# Patient Record
Sex: MALE | Race: BLACK OR AFRICAN AMERICAN | NOT HISPANIC OR LATINO | Employment: FULL TIME | URBAN - METROPOLITAN AREA
[De-identification: names, ages, dates, MRNs, and addresses within clinical notes are randomized per-mention and may not be internally consistent; named-entity substitution may affect disease eponyms.]

---

## 2022-10-10 ENCOUNTER — HOSPITAL ENCOUNTER (INPATIENT)
Facility: HOSPITAL | Age: 42
LOS: 8 days | Discharge: HOME HEALTH | DRG: 682 | End: 2022-10-18
Attending: EMERGENCY MEDICINE | Admitting: STUDENT IN AN ORGANIZED HEALTH CARE EDUCATION/TRAINING PROGRAM
Payer: COMMERCIAL

## 2022-10-10 DIAGNOSIS — I16.1 HYPERTENSIVE EMERGENCY: ICD-10-CM

## 2022-10-10 DIAGNOSIS — R79.89 ELEVATED BRAIN NATRIURETIC PEPTIDE (BNP) LEVEL: ICD-10-CM

## 2022-10-10 DIAGNOSIS — R07.9 CHEST PAIN: ICD-10-CM

## 2022-10-10 DIAGNOSIS — R79.89 ELEVATED TROPONIN: Primary | ICD-10-CM

## 2022-10-10 DIAGNOSIS — N17.9 ACUTE RENAL FAILURE, UNSPECIFIED ACUTE RENAL FAILURE TYPE: ICD-10-CM

## 2022-10-10 DIAGNOSIS — R06.02 SOB (SHORTNESS OF BREATH): ICD-10-CM

## 2022-10-10 DIAGNOSIS — I50.31 ACUTE HEART FAILURE WITH PRESERVED EJECTION FRACTION: ICD-10-CM

## 2022-10-10 DIAGNOSIS — R94.31 T WAVE INVERSION IN EKG: ICD-10-CM

## 2022-10-10 PROBLEM — E87.20 ACIDOSIS, METABOLIC: Status: ACTIVE | Noted: 2022-10-10

## 2022-10-10 PROBLEM — E83.51 HYPOCALCEMIA: Status: ACTIVE | Noted: 2022-10-10

## 2022-10-10 PROBLEM — D64.9 ANEMIA: Status: ACTIVE | Noted: 2022-10-10

## 2022-10-10 PROBLEM — E66.9 OBESITY (BMI 30-39.9): Status: ACTIVE | Noted: 2022-10-10

## 2022-10-10 PROBLEM — E88.09 HYPOALBUMINEMIA: Status: ACTIVE | Noted: 2022-10-10

## 2022-10-10 LAB
ALBUMIN SERPL BCP-MCNC: 2.7 G/DL (ref 3.5–5.2)
ALP SERPL-CCNC: 119 U/L (ref 55–135)
ALT SERPL W/O P-5'-P-CCNC: 94 U/L (ref 10–44)
ANION GAP SERPL CALC-SCNC: 17 MMOL/L (ref 8–16)
APTT BLDCRRT: 24.5 SEC (ref 21–32)
AST SERPL-CCNC: 39 U/L (ref 10–40)
BACTERIA #/AREA URNS HPF: NORMAL /HPF
BASOPHILS # BLD AUTO: 0.03 K/UL (ref 0–0.2)
BASOPHILS NFR BLD: 0.4 % (ref 0–1.9)
BILIRUB SERPL-MCNC: 0.6 MG/DL (ref 0.1–1)
BILIRUB UR QL STRIP: NEGATIVE
BNP SERPL-MCNC: 3678 PG/ML (ref 0–99)
BUN SERPL-MCNC: 80 MG/DL (ref 6–20)
CALCIUM SERPL-MCNC: 6.7 MG/DL (ref 8.7–10.5)
CHLORIDE SERPL-SCNC: 103 MMOL/L (ref 95–110)
CLARITY UR: CLEAR
CO2 SERPL-SCNC: 17 MMOL/L (ref 23–29)
COLOR UR: YELLOW
CREAT SERPL-MCNC: 7.9 MG/DL (ref 0.5–1.4)
CREAT UR-MCNC: 23.8 MG/DL (ref 23–375)
DIFFERENTIAL METHOD: ABNORMAL
EOSINOPHIL # BLD AUTO: 0.1 K/UL (ref 0–0.5)
EOSINOPHIL NFR BLD: 1.1 % (ref 0–8)
ERYTHROCYTE [DISTWIDTH] IN BLOOD BY AUTOMATED COUNT: 15.9 % (ref 11.5–14.5)
EST. GFR  (NO RACE VARIABLE): 8 ML/MIN/1.73 M^2
FERRITIN SERPL-MCNC: 29 NG/ML (ref 20–300)
GLUCOSE SERPL-MCNC: 104 MG/DL (ref 70–110)
GLUCOSE UR QL STRIP: NEGATIVE
HCT VFR BLD AUTO: 31.6 % (ref 40–54)
HCV AB SERPL QL IA: NORMAL
HGB BLD-MCNC: 10.7 G/DL (ref 14–18)
HGB UR QL STRIP: ABNORMAL
HIV 1+2 AB+HIV1 P24 AG SERPL QL IA: NORMAL
HYALINE CASTS #/AREA URNS LPF: 0 /LPF
IMM GRANULOCYTES # BLD AUTO: 0.02 K/UL (ref 0–0.04)
IMM GRANULOCYTES NFR BLD AUTO: 0.2 % (ref 0–0.5)
INR PPP: 1.1 (ref 0.8–1.2)
KETONES UR QL STRIP: NEGATIVE
LACTATE SERPL-SCNC: 0.6 MMOL/L (ref 0.5–2.2)
LEUKOCYTE ESTERASE UR QL STRIP: NEGATIVE
LYMPHOCYTES # BLD AUTO: 1 K/UL (ref 1–4.8)
LYMPHOCYTES NFR BLD: 12.1 % (ref 18–48)
MAGNESIUM SERPL-MCNC: 2 MG/DL (ref 1.6–2.6)
MCH RBC QN AUTO: 29.4 PG (ref 27–31)
MCHC RBC AUTO-ENTMCNC: 33.9 G/DL (ref 32–36)
MCV RBC AUTO: 87 FL (ref 82–98)
MICROSCOPIC COMMENT: NORMAL
MONOCYTES # BLD AUTO: 1 K/UL (ref 0.3–1)
MONOCYTES NFR BLD: 12 % (ref 4–15)
NEUTROPHILS # BLD AUTO: 6 K/UL (ref 1.8–7.7)
NEUTROPHILS NFR BLD: 74.2 % (ref 38–73)
NITRITE UR QL STRIP: NEGATIVE
NRBC BLD-RTO: 0 /100 WBC
PH UR STRIP: 6 [PH] (ref 5–8)
PHOSPHATE SERPL-MCNC: 6.1 MG/DL (ref 2.7–4.5)
PLATELET # BLD AUTO: 342 K/UL (ref 150–450)
PMV BLD AUTO: 10.3 FL (ref 9.2–12.9)
POTASSIUM SERPL-SCNC: 3.5 MMOL/L (ref 3.5–5.1)
PROT SERPL-MCNC: 5.8 G/DL (ref 6–8.4)
PROT UR QL STRIP: ABNORMAL
PROT UR-MCNC: 45 MG/DL (ref 0–15)
PROT/CREAT UR: 1.89 MG/G{CREAT} (ref 0–0.2)
PROTHROMBIN TIME: 11.6 SEC (ref 9–12.5)
PTH-INTACT SERPL-MCNC: 517.7 PG/ML (ref 9–77)
PTH-INTACT SERPL-MCNC: 632.4 PG/ML (ref 9–77)
RBC # BLD AUTO: 3.64 M/UL (ref 4.6–6.2)
RBC #/AREA URNS HPF: 1 /HPF (ref 0–4)
SARS-COV-2 RDRP RESP QL NAA+PROBE: NEGATIVE
SODIUM SERPL-SCNC: 137 MMOL/L (ref 136–145)
SODIUM UR-SCNC: 91 MMOL/L (ref 20–250)
SP GR UR STRIP: 1.02 (ref 1–1.03)
TROPONIN I SERPL DL<=0.01 NG/ML-MCNC: 0.47 NG/ML (ref 0–0.03)
TROPONIN I SERPL DL<=0.01 NG/ML-MCNC: 0.48 NG/ML (ref 0–0.03)
TROPONIN I SERPL DL<=0.01 NG/ML-MCNC: 0.48 NG/ML (ref 0–0.03)
TSH SERPL DL<=0.005 MIU/L-ACNC: 1.7 UIU/ML (ref 0.4–4)
URN SPEC COLLECT METH UR: ABNORMAL
UROBILINOGEN UR STRIP-ACNC: NEGATIVE EU/DL
WBC # BLD AUTO: 8.1 K/UL (ref 3.9–12.7)
WBC #/AREA URNS HPF: 0 /HPF (ref 0–5)

## 2022-10-10 PROCEDURE — 87389 HIV-1 AG W/HIV-1&-2 AB AG IA: CPT | Performed by: EMERGENCY MEDICINE

## 2022-10-10 PROCEDURE — 86803 HEPATITIS C AB TEST: CPT | Performed by: EMERGENCY MEDICINE

## 2022-10-10 PROCEDURE — 83970 ASSAY OF PARATHORMONE: CPT | Mod: 91 | Performed by: STUDENT IN AN ORGANIZED HEALTH CARE EDUCATION/TRAINING PROGRAM

## 2022-10-10 PROCEDURE — 80307 DRUG TEST PRSMV CHEM ANLYZR: CPT | Performed by: STUDENT IN AN ORGANIZED HEALTH CARE EDUCATION/TRAINING PROGRAM

## 2022-10-10 PROCEDURE — 99291 CRITICAL CARE FIRST HOUR: CPT | Mod: 25

## 2022-10-10 PROCEDURE — 85730 THROMBOPLASTIN TIME PARTIAL: CPT | Performed by: STUDENT IN AN ORGANIZED HEALTH CARE EDUCATION/TRAINING PROGRAM

## 2022-10-10 PROCEDURE — 84156 ASSAY OF PROTEIN URINE: CPT | Performed by: STUDENT IN AN ORGANIZED HEALTH CARE EDUCATION/TRAINING PROGRAM

## 2022-10-10 PROCEDURE — 83735 ASSAY OF MAGNESIUM: CPT | Performed by: INTERNAL MEDICINE

## 2022-10-10 PROCEDURE — 80053 COMPREHEN METABOLIC PANEL: CPT | Performed by: PHYSICIAN ASSISTANT

## 2022-10-10 PROCEDURE — 93010 EKG 12-LEAD: ICD-10-PCS | Mod: ,,, | Performed by: SPECIALIST

## 2022-10-10 PROCEDURE — 84300 ASSAY OF URINE SODIUM: CPT | Performed by: STUDENT IN AN ORGANIZED HEALTH CARE EDUCATION/TRAINING PROGRAM

## 2022-10-10 PROCEDURE — 25000003 PHARM REV CODE 250: Performed by: EMERGENCY MEDICINE

## 2022-10-10 PROCEDURE — 36415 COLL VENOUS BLD VENIPUNCTURE: CPT | Performed by: EMERGENCY MEDICINE

## 2022-10-10 PROCEDURE — 63600175 PHARM REV CODE 636 W HCPCS: Performed by: EMERGENCY MEDICINE

## 2022-10-10 PROCEDURE — 93005 ELECTROCARDIOGRAM TRACING: CPT

## 2022-10-10 PROCEDURE — 85610 PROTHROMBIN TIME: CPT | Performed by: STUDENT IN AN ORGANIZED HEALTH CARE EDUCATION/TRAINING PROGRAM

## 2022-10-10 PROCEDURE — 84443 ASSAY THYROID STIM HORMONE: CPT | Performed by: STUDENT IN AN ORGANIZED HEALTH CARE EDUCATION/TRAINING PROGRAM

## 2022-10-10 PROCEDURE — 11000001 HC ACUTE MED/SURG PRIVATE ROOM

## 2022-10-10 PROCEDURE — 63600175 PHARM REV CODE 636 W HCPCS: Performed by: NURSE PRACTITIONER

## 2022-10-10 PROCEDURE — 99292 CRITICAL CARE ADDL 30 MIN: CPT

## 2022-10-10 PROCEDURE — 25000003 PHARM REV CODE 250: Performed by: PHYSICIAN ASSISTANT

## 2022-10-10 PROCEDURE — 93010 ELECTROCARDIOGRAM REPORT: CPT | Mod: ,,, | Performed by: SPECIALIST

## 2022-10-10 PROCEDURE — 85025 COMPLETE CBC W/AUTO DIFF WBC: CPT | Performed by: PHYSICIAN ASSISTANT

## 2022-10-10 PROCEDURE — 63600175 PHARM REV CODE 636 W HCPCS: Performed by: STUDENT IN AN ORGANIZED HEALTH CARE EDUCATION/TRAINING PROGRAM

## 2022-10-10 PROCEDURE — 84466 ASSAY OF TRANSFERRIN: CPT | Performed by: STUDENT IN AN ORGANIZED HEALTH CARE EDUCATION/TRAINING PROGRAM

## 2022-10-10 PROCEDURE — 84484 ASSAY OF TROPONIN QUANT: CPT | Mod: 91 | Performed by: STUDENT IN AN ORGANIZED HEALTH CARE EDUCATION/TRAINING PROGRAM

## 2022-10-10 PROCEDURE — 84540 ASSAY OF URINE/UREA-N: CPT | Performed by: STUDENT IN AN ORGANIZED HEALTH CARE EDUCATION/TRAINING PROGRAM

## 2022-10-10 PROCEDURE — 96375 TX/PRO/DX INJ NEW DRUG ADDON: CPT

## 2022-10-10 PROCEDURE — 83880 ASSAY OF NATRIURETIC PEPTIDE: CPT | Performed by: PHYSICIAN ASSISTANT

## 2022-10-10 PROCEDURE — 96374 THER/PROPH/DIAG INJ IV PUSH: CPT

## 2022-10-10 PROCEDURE — 83970 ASSAY OF PARATHORMONE: CPT | Performed by: INTERNAL MEDICINE

## 2022-10-10 PROCEDURE — 84484 ASSAY OF TROPONIN QUANT: CPT | Mod: 91 | Performed by: PHYSICIAN ASSISTANT

## 2022-10-10 PROCEDURE — 84100 ASSAY OF PHOSPHORUS: CPT | Performed by: INTERNAL MEDICINE

## 2022-10-10 PROCEDURE — 83605 ASSAY OF LACTIC ACID: CPT | Performed by: STUDENT IN AN ORGANIZED HEALTH CARE EDUCATION/TRAINING PROGRAM

## 2022-10-10 PROCEDURE — U0002 COVID-19 LAB TEST NON-CDC: HCPCS | Performed by: EMERGENCY MEDICINE

## 2022-10-10 PROCEDURE — 36415 COLL VENOUS BLD VENIPUNCTURE: CPT | Performed by: STUDENT IN AN ORGANIZED HEALTH CARE EDUCATION/TRAINING PROGRAM

## 2022-10-10 PROCEDURE — 81000 URINALYSIS NONAUTO W/SCOPE: CPT | Performed by: PHYSICIAN ASSISTANT

## 2022-10-10 PROCEDURE — 83036 HEMOGLOBIN GLYCOSYLATED A1C: CPT | Performed by: STUDENT IN AN ORGANIZED HEALTH CARE EDUCATION/TRAINING PROGRAM

## 2022-10-10 PROCEDURE — 25000003 PHARM REV CODE 250: Performed by: STUDENT IN AN ORGANIZED HEALTH CARE EDUCATION/TRAINING PROGRAM

## 2022-10-10 PROCEDURE — 36415 COLL VENOUS BLD VENIPUNCTURE: CPT | Performed by: PHYSICIAN ASSISTANT

## 2022-10-10 PROCEDURE — 82728 ASSAY OF FERRITIN: CPT | Performed by: STUDENT IN AN ORGANIZED HEALTH CARE EDUCATION/TRAINING PROGRAM

## 2022-10-10 RX ORDER — AMLODIPINE BESYLATE 5 MG/1
10 TABLET ORAL DAILY
Status: DISCONTINUED | OUTPATIENT
Start: 2022-10-11 | End: 2022-10-11

## 2022-10-10 RX ORDER — CALCIUM GLUCONATE 20 MG/ML
1 INJECTION, SOLUTION INTRAVENOUS ONCE
Status: COMPLETED | OUTPATIENT
Start: 2022-10-10 | End: 2022-10-10

## 2022-10-10 RX ORDER — ASPIRIN 325 MG
325 TABLET ORAL
Status: COMPLETED | OUTPATIENT
Start: 2022-10-10 | End: 2022-10-10

## 2022-10-10 RX ORDER — HEPARIN SODIUM 5000 [USP'U]/ML
5000 INJECTION, SOLUTION INTRAVENOUS; SUBCUTANEOUS EVERY 8 HOURS
Status: DISCONTINUED | OUTPATIENT
Start: 2022-10-10 | End: 2022-10-18 | Stop reason: HOSPADM

## 2022-10-10 RX ORDER — SODIUM CHLORIDE 0.9 % (FLUSH) 0.9 %
10 SYRINGE (ML) INJECTION
Status: DISCONTINUED | OUTPATIENT
Start: 2022-10-10 | End: 2022-10-18 | Stop reason: HOSPADM

## 2022-10-10 RX ORDER — LABETALOL HYDROCHLORIDE 5 MG/ML
20 INJECTION, SOLUTION INTRAVENOUS
Status: COMPLETED | OUTPATIENT
Start: 2022-10-10 | End: 2022-10-10

## 2022-10-10 RX ORDER — NALOXONE HCL 0.4 MG/ML
0.02 VIAL (ML) INJECTION
Status: DISCONTINUED | OUTPATIENT
Start: 2022-10-10 | End: 2022-10-18 | Stop reason: HOSPADM

## 2022-10-10 RX ORDER — HYDROMORPHONE HYDROCHLORIDE 2 MG/ML
1 INJECTION, SOLUTION INTRAMUSCULAR; INTRAVENOUS; SUBCUTANEOUS
Status: COMPLETED | OUTPATIENT
Start: 2022-10-10 | End: 2022-10-10

## 2022-10-10 RX ORDER — HYDROCHLOROTHIAZIDE 25 MG/1
25 TABLET ORAL DAILY
Status: ON HOLD | COMMUNITY
End: 2022-10-18 | Stop reason: HOSPADM

## 2022-10-10 RX ORDER — NICARDIPINE HYDROCHLORIDE 0.2 MG/ML
0-15 INJECTION INTRAVENOUS CONTINUOUS
Status: DISCONTINUED | OUTPATIENT
Start: 2022-10-10 | End: 2022-10-12

## 2022-10-10 RX ORDER — ACETAMINOPHEN 325 MG/1
650 TABLET ORAL EVERY 8 HOURS PRN
Status: DISCONTINUED | OUTPATIENT
Start: 2022-10-10 | End: 2022-10-11

## 2022-10-10 RX ORDER — HYDRALAZINE HYDROCHLORIDE 20 MG/ML
10 INJECTION INTRAMUSCULAR; INTRAVENOUS EVERY 8 HOURS PRN
Status: DISCONTINUED | OUTPATIENT
Start: 2022-10-10 | End: 2022-10-18 | Stop reason: HOSPADM

## 2022-10-10 RX ORDER — LABETALOL HYDROCHLORIDE 5 MG/ML
20 INJECTION, SOLUTION INTRAVENOUS EVERY 4 HOURS PRN
Status: DISCONTINUED | OUTPATIENT
Start: 2022-10-10 | End: 2022-10-18 | Stop reason: HOSPADM

## 2022-10-10 RX ORDER — AMLODIPINE AND VALSARTAN 10; 160 MG/1; MG/1
1 TABLET ORAL DAILY
Status: ON HOLD | COMMUNITY
End: 2022-10-18 | Stop reason: HOSPADM

## 2022-10-10 RX ORDER — FUROSEMIDE 10 MG/ML
80 INJECTION INTRAMUSCULAR; INTRAVENOUS
Status: DISCONTINUED | OUTPATIENT
Start: 2022-10-10 | End: 2022-10-11

## 2022-10-10 RX ADMIN — FUROSEMIDE 80 MG: 10 INJECTION, SOLUTION INTRAMUSCULAR; INTRAVENOUS at 06:10

## 2022-10-10 RX ADMIN — NICARDIPINE HYDROCHLORIDE 5 MG/HR: 0.2 INJECTION, SOLUTION INTRAVENOUS at 06:10

## 2022-10-10 RX ADMIN — LABETALOL HYDROCHLORIDE 20 MG: 5 INJECTION INTRAVENOUS at 04:10

## 2022-10-10 RX ADMIN — HYDRALAZINE HYDROCHLORIDE 10 MG: 20 INJECTION INTRAMUSCULAR; INTRAVENOUS at 10:10

## 2022-10-10 RX ADMIN — CALCIUM GLUCONATE 1 G: 20 INJECTION, SOLUTION INTRAVENOUS at 06:10

## 2022-10-10 RX ADMIN — ASPIRIN 325 MG ORAL TABLET 325 MG: 325 PILL ORAL at 03:10

## 2022-10-10 RX ADMIN — HYDROMORPHONE HYDROCHLORIDE 1 MG: 2 INJECTION INTRAMUSCULAR; INTRAVENOUS; SUBCUTANEOUS at 04:10

## 2022-10-10 RX ADMIN — AMLODIPINE BESYLATE 10 MG: 5 TABLET ORAL at 11:10

## 2022-10-10 RX ADMIN — HEPARIN SODIUM 5000 UNITS: 5000 INJECTION INTRAVENOUS; SUBCUTANEOUS at 09:10

## 2022-10-10 NOTE — ED NOTES
Lights have been turned off for comfort. No needs or questions at this time. Patient has a call light within reach.

## 2022-10-10 NOTE — ED NOTES
João Ham presents to the ED with c/o mid sternal chest pain with SOB and bilateral lower leg swelling that started 2 weeks ago. Patient arrives to the ED hypertensive. Patient reports that he stopped taking his BP meds because he thought they were making him swell. . MARY VSS  Verified patient's name and date of birth.

## 2022-10-10 NOTE — Clinical Note
Diagnosis: Hypertensive emergency [[id number]]   Admitting Provider:: RYDER CONTRERAS [[id number]]   Future Attending Provider: RYDER CONTRERAS [[id number]]   Reason for IP Medical Treatment  (Clinical interventions that can only be accomplished in the IP setting? ) :: IV BP meds; Cardiology and Renal evaluations   Estimated Length of Stay:: 3-4 midnights   I certify that Inpatient services for greater than or equal to 2 midnights are medically necessary:: Yes   Plans for Post-Acute care--if anticipated (pick the single best option):: C. Discharge home with home health services   Special Needs:: No Special Needs [1]

## 2022-10-10 NOTE — ED PROVIDER NOTES
"Encounter Date: 10/10/2022    SCRIBE #1 NOTE: I, Joselyn Becerra, am scribing for, and in the presence of,  Reynaldo Fox MD.     History     Chief Complaint   Patient presents with    Shortness of Breath     X 2 weeks     Chest Pain    Cough    Leg Swelling     Scrotum swelling      Time seen by provider: 2:51 PM on 10/10/2022    João Ham is a 42 y.o. male with a PMHx of HTN who presents to the ED with an onset of leg swelling, scrotal swelling, abdominal distention, and SOB for approximately 2 weeks with 1 episode of "racing heart." Patient reports he has a "good walk" from his vehicle to his office at work and notes he had to stop to catch his breath. He denies prior similar episodes of symptoms. Patient states he stopped his Amlodipine, Valsartin and HCTZ a week ago because he thought it was contributing to his swelling. The patient denies any other symptoms at this time. No documented PSHx.    The history is provided by the patient.   Review of patient's allergies indicates:  No Known Allergies  No past medical history on file.  No past surgical history on file.  No family history on file.     Review of Systems   Respiratory:  Positive for shortness of breath.    Cardiovascular:  Positive for palpitations and leg swelling.   Gastrointestinal:  Positive for abdominal distention.   Genitourinary:  Positive for scrotal swelling.   All other systems reviewed and are negative.    Physical Exam     Initial Vitals [10/10/22 1340]   BP Pulse Resp Temp SpO2   (!) 212/139 106 20 98.5 °F (36.9 °C) 98 %      MAP       --         Physical Exam    Nursing note and vitals reviewed.  Constitutional: He appears well-developed and well-nourished. He is not diaphoretic.  Non-toxic appearance. He does not have a sickly appearance. He does not appear ill. No distress.   Appears fatigued   HENT:   Head: Normocephalic and atraumatic.   Eyes: EOM are normal.   Neck: Neck supple.   Normal range of motion.  Cardiovascular:  Normal " rate, regular rhythm and normal heart sounds.     Exam reveals no gallop and no friction rub.       No murmur heard.  Pulmonary/Chest: Breath sounds normal. No respiratory distress. He has no wheezes. He has no rhonchi. He has no rales.   Abdominal: Abdomen is soft. He exhibits no distension. There is no abdominal tenderness.   Genitourinary:    Genitourinary Comments: Markedly swollen testicles.     Musculoskeletal:         General: Normal range of motion.      Cervical back: Normal range of motion and neck supple. No rigidity. Normal range of motion.      Comments: Pitting edema to BLEs.     Neurological: He is alert and oriented to person, place, and time.   Skin: Skin is warm and dry. No rash noted.   Psychiatric: He has a normal mood and affect. His behavior is normal. Judgment and thought content normal.       ED Course   Critical Care    Date/Time: 10/10/2022 8:09 PM  Performed by: Reynaldo Fox MD  Authorized by: Jono Knowles MD   Direct patient critical care time: 66 minutes  Ordering / reviewing critical care time: 12 minutes  Documentation critical care time: 10 minutes  Consulting other physicians critical care time: 12 (HM, cards, neph) minutes  Total critical care time (exclusive of procedural time) : 100 minutes  Critical care was necessary to treat or prevent imminent or life-threatening deterioration of the following conditions: cardiac failure, metabolic crisis and renal failure.  Critical care was time spent personally by me on the following activities: discussions with consultants, evaluation of patient's response to treatment, examination of patient, obtaining history from patient or surrogate, ordering and performing treatments and interventions, ordering and review of laboratory studies, ordering and review of radiographic studies, pulse oximetry and re-evaluation of patient's condition.      Labs Reviewed   CBC W/ AUTO DIFFERENTIAL - Abnormal; Notable for the following components:        Result Value    RBC 3.64 (*)     Hemoglobin 10.7 (*)     Hematocrit 31.6 (*)     RDW 15.9 (*)     Gran % 74.2 (*)     Lymph % 12.1 (*)     All other components within normal limits    Narrative:     Release to patient->Immediate   COMPREHENSIVE METABOLIC PANEL - Abnormal; Notable for the following components:    CO2 17 (*)     BUN 80 (*)     Creatinine 7.9 (*)     Calcium 6.7 (*)     Total Protein 5.8 (*)     Albumin 2.7 (*)     ALT 94 (*)     Anion Gap 17 (*)     eGFR 8 (*)     All other components within normal limits    Narrative:     Release to patient->Immediate   CA  critical result(s) called and verbal readback obtained from becky macedo by TCBharati 10/10/2022 16:10   TROPONIN I - Abnormal; Notable for the following components:    Troponin I 0.476 (*)     All other components within normal limits    Narrative:     Release to patient->Immediate   TROPONIN I - Abnormal; Notable for the following components:    Troponin I 0.481 (*)     All other components within normal limits   B-TYPE NATRIURETIC PEPTIDE - Abnormal; Notable for the following components:    BNP 3,678 (*)     All other components within normal limits    Narrative:     Release to patient->Immediate   URINALYSIS, REFLEX TO URINE CULTURE - Abnormal; Notable for the following components:    Protein, UA 2+ (*)     Occult Blood UA 1+ (*)     All other components within normal limits    Narrative:     Specimen Source->Urine   PHOSPHORUS - Abnormal; Notable for the following components:    Phosphorus 6.1 (*)     All other components within normal limits   PTH, INTACT - Abnormal; Notable for the following components:    PTH, Intact 632.4 (*)     All other components within normal limits   SARS-COV-2 RNA AMPLIFICATION, QUAL   URINALYSIS MICROSCOPIC    Narrative:     Specimen Source->Urine   MAGNESIUM   HIV 1 / 2 ANTIBODY   HEPATITIS C ANTIBODY   TSH   LACTIC ACID, PLASMA   TROPONIN I   HEMOGLOBIN A1C   IRON AND TIBC   FERRITIN   PROTIME-INR   APTT    TOXICOLOGY SCREEN, URINE, RANDOM (COMPLIANCE)   PROTEIN / CREATININE RATIO, URINE   SODIUM, URINE, RANDOM   UREA NITROGEN, URINE, RANDOM   CREATININE, URINE, RANDOM   PTH, INTACT        ECG Results              EKG 12-lead (In process)  Result time 10/10/22 14:41:35      In process by Interface, Lab In [facility] (10/10/22 14:41:35)                   Narrative:    Test Reason : R06.02,    Vent. Rate : 102 BPM     Atrial Rate : 102 BPM     P-R Int : 132 ms          QRS Dur : 098 ms      QT Int : 382 ms       P-R-T Axes : 079 -65 085 degrees     QTc Int : 497 ms    Sinus tachycardia  Right atrial enlargement  Left anterior fascicular block  Cannot rule out Anterior infarct ,age undetermined  Abnormal ECG  When compared with ECG of 10-OCT-2022 13:49,  Previous ECG has undetermined rhythm, needs review    Referred By: BRANDON   SELF           Confirmed By:                                   Imaging Results              US Retroperitoneal Complete (Final result)  Result time 10/10/22 19:58:58      Final result by Kenyon Macdonald DO (10/10/22 19:58:58)                   Impression:      No hydronephrosis.    Findings compatible with medical renal disease.    Right pleural effusion.      Electronically signed by: Kenyon Macdonald  Date:    10/10/2022  Time:    19:58               Narrative:    EXAMINATION:  US RETROPERITONEAL COMPLETE    CLINICAL HISTORY:  kidney failure;    TECHNIQUE:  Ultrasound of the kidneys and urinary bladder was performed including color flow and Doppler evaluation of the kidneys.    COMPARISON:  None.    FINDINGS:  Right kidney: The right kidney measures 9.6 cm.  There is cortical thinning and loss of corticomedullary distinction. Resistive index measures 0.74.  No mass. No renal stone. No hydronephrosis.    Left kidney: The left kidney measures 9.9 cm. There is cortical thinning and loss of corticomedullary distinction. Resistive index measures 0.70.  There is a simple cyst measuring 1.2 x 1.3 x  1.1 cm.  No renal stone. No hydronephrosis.    The bladder is partially distended at the time of scanning and has an unremarkable appearance.    There is a right pleural effusion.                                       X-Ray Chest 1 View (Final result)  Result time 10/10/22 14:34:00   Procedure changed from X-Ray Chest AP Portable     Final result by David Hartman MD (10/10/22 14:34:00)                   Impression:      Cardiomegaly.      Electronically signed by: David Hartman MD  Date:    10/10/2022  Time:    14:34               Narrative:    EXAMINATION:  XR CHEST 1 VIEW    CLINICAL HISTORY:  Chest Pain;    TECHNIQUE:  Single frontal view of the chest was performed.    COMPARISON:  None    FINDINGS:  The heart is mildly enlarged.  The lungs are well expanded without consolidation or pleural effusion.                                       Medications   sodium chloride 0.9% flush 10 mL (has no administration in time range)   acetaminophen tablet 650 mg (has no administration in time range)   naloxone 0.4 mg/mL injection 0.02 mg (has no administration in time range)   heparin (porcine) injection 5,000 Units (has no administration in time range)   niCARdipine 40 mg/200 mL (0.2 mg/mL) infusion (0 mg/hr Intravenous Stopped 10/10/22 1942)   furosemide injection 80 mg (80 mg Intravenous Given 10/10/22 1800)   aspirin tablet 325 mg (325 mg Oral Given 10/10/22 1509)   labetaloL injection 20 mg (20 mg Intravenous Given 10/10/22 1657)   HYDROmorphone (PF) injection 1 mg (1 mg Intravenous Given 10/10/22 1656)   calcium gluconate 1 g in NS IVPB (premixed) (0 g Intravenous Stopped 10/10/22 1938)     Medical Decision Making:   History:   Old Medical Records: I decided to obtain old medical records.  Independently Interpreted Test(s):   I have ordered and independently interpreted EKG Reading(s) - see prior notes  Clinical Tests:   Lab Tests: Ordered and Reviewed  Radiological Study: Ordered and Reviewed  Medical  Tests: Ordered and Reviewed        Scribe Attestation:   Scribe #1: I performed the above scribed service and the documentation accurately describes the services I performed. I attest to the accuracy of the note.      ED Course as of 10/10/22 2011   Mon Oct 10, 2022   1407 Sinus tachycardia left axis left anterior fascicular block 102 beats per minute no ST elevation or depression pulmonary disease pattern independently interpreted [EF]   1446 X-Ray Chest 1 View [EF]   1518 WBC: 8.10 [EF]   1518 Hemoglobin(!): 10.7 [EF]   1518 Platelets: 342 [EF]   1518 SARS-CoV-2 RNA, Amplification, Qual: Negative [EF]   1553 Troponin I(!): 0.476 [EF]   1554 BNP(!): 3,678 [EF]   1610 Creatinine(!): 7.9 [EF]   1611 Calcium(!!): 6.7 [EF]   1611 CO2(!): 17 [EF]   1611 BUN(!): 80 [EF]   1638 Case discussed with Dr. Madrid who recommends renal ultrasound, no fluids at this time.  He agrees with calling Cardiology. [EF]   1642 Case discussed with Dr. Garsia who agrees the patient needs blood pressure medication.  He has no objection to IV labetalol or p.o. clonidine or IV Cardene. [EF]   1649 42-year-old male presents to the ER with 2 weeks of lower extremity swelling and scrotal edema.  Also shortness of breath.  History of hypertension but has been off of medication for 2 weeks.  Blood pressure markedly elevated in the emergency room.  Clinically he is in no distress his breath sounds are clear.  Numerous metabolic derangements.  He is most importantly in acute kidney failure however is still making urine.  Case discussed with nephrology recommends renal ultrasound, no IV fluids and admission to the hospital. No emergent HD at this time. Case discussed with cardiology who recommends blood pressure management.  Case discussed with Hospital Medicine who will admit the patient. [EF]      ED Course User Index  [EF] Reynaldo Fox MD               I, Dr. Fox, personally performed the services described in this documentation. All  medical record entries made by the scribe were at my direction and in my presence.  I have reviewed the chart and agree that the record reflects my personal performance and is accurate and complete.8:11 PM 10/10/2022    Clinical Impression:   Final diagnoses:  [R06.02] SOB (shortness of breath)  [R07.9] Chest pain  [I16.1] Hypertensive emergency        ED Disposition Condition    Admit Stable                Reynaldo Fox MD  10/10/22 2011

## 2022-10-10 NOTE — Clinical Note
A pre-sedation assessment was completed by the physician immediately prior to sedation start.     A verbal ASA of 2 and Mallampati of 1 was given to this nurse by Dr. Guidry.

## 2022-10-10 NOTE — FIRST PROVIDER EVALUATION
Emergency Department TeleTriage Encounter Note      CHIEF COMPLAINT    Chief Complaint   Patient presents with    Shortness of Breath     X 2 weeks     Chest Pain    Cough    Leg Swelling     Scrotum swelling        VITAL SIGNS   Initial Vitals [10/10/22 1340]   BP Pulse Resp Temp SpO2   (!) 212/139 106 20 98.5 °F (36.9 °C) 98 %      MAP       --            ALLERGIES    Review of patient's allergies indicates:  No Known Allergies    PROVIDER TRIAGE NOTE  HPI: João Ham, a 42 y.o. male presents to the ED for evaluation of leg swelling that has extended into his testicles bilaterally.  Attest to SOB and cough as well as CP.  D/c'ed his HTN meds about 1 week ago.      Constitutional: well nourished, well developed, appearing stated age, NAD   HEENT: NCAT, symmetrical lids, No obvious facial deformity.  Normal phonation. Normal Conjunctiva, Gross EOMs intact   Neck: NAROM   Respiratory: Normal effort.  No obvious use of accessory muscles   Musculoskeletal: Moved upper extremities well   Neuro: Alert, answers questions appropriately    Psych: appropriate mood and affect          ORDERS  Labs Reviewed   HIV 1 / 2 ANTIBODY   HEPATITIS C ANTIBODY       ED Orders (720h ago, onward)      Start Ordered     Status Ordering Provider    10/10/22 1345 10/10/22 1344  EKG 12-lead  Once         Completed by ROSITA BEAL on 10/10/2022 at  1:58 PM KIERAN SAXENA    10/10/22 1344 10/10/22 1343  HIV 1/2 Ag/Ab (4th Gen)  STAT         Pending Collection PRAVEEN CHAIDEZ    10/10/22 1344 10/10/22 1343  Hepatitis C Antibody  STAT         Pending Collection PRAVEEN CHAIDEZ              Virtual Visit Note: The provider triage portion of this emergency department evaluation and documentation was performed via [employer], a HIPAA-compliant telemedicine application, in concert with a tele-presenter in the room. A face to face patient evaluation with one of my colleagues will occur once the patient is placed in an emergency  department room.      DISCLAIMER: This note was prepared with aitainment voice recognition transcription software. Garbled syntax, mangled pronouns, and other bizarre constructions may be attributed to that software system.

## 2022-10-11 LAB
25(OH)D3+25(OH)D2 SERPL-MCNC: 13 NG/ML (ref 30–96)
ALBUMIN SERPL BCP-MCNC: 2.8 G/DL (ref 3.5–5.2)
ALP SERPL-CCNC: 107 U/L (ref 55–135)
ALT SERPL W/O P-5'-P-CCNC: 86 U/L (ref 10–44)
AMPHET+METHAMPHET UR QL: NEGATIVE
ANION GAP SERPL CALC-SCNC: 16 MMOL/L (ref 8–16)
AORTIC ROOT ANNULUS: 3.89 CM
AORTIC VALVE CUSP SEPERATION: 2.46 CM
AST SERPL-CCNC: 22 U/L (ref 10–40)
AV INDEX (PROSTH): 0.69
AV MEAN GRADIENT: 6 MMHG
AV PEAK GRADIENT: 10 MMHG
AV VALVE AREA: 2.33 CM2
AV VELOCITY RATIO: 0.66
BARBITURATES UR QL SCN>200 NG/ML: NEGATIVE
BASOPHILS # BLD AUTO: 0.02 K/UL (ref 0–0.2)
BASOPHILS NFR BLD: 0.3 % (ref 0–1.9)
BENZODIAZ UR QL SCN>200 NG/ML: NEGATIVE
BILIRUB SERPL-MCNC: 0.7 MG/DL (ref 0.1–1)
BSA FOR ECHO PROCEDURE: 2.71 M2
BUN SERPL-MCNC: 77 MG/DL (ref 6–20)
BZE UR QL SCN: NEGATIVE
CA-I BLDV-SCNC: 0.87 MMOL/L (ref 1.06–1.42)
CALCIUM SERPL-MCNC: 6.8 MG/DL (ref 8.7–10.5)
CANNABINOIDS UR QL SCN: NEGATIVE
CHLORIDE SERPL-SCNC: 99 MMOL/L (ref 95–110)
CHOLEST SERPL-MCNC: 141 MG/DL (ref 120–199)
CHOLEST/HDLC SERPL: 3.6 {RATIO} (ref 2–5)
CO2 SERPL-SCNC: 20 MMOL/L (ref 23–29)
CREAT SERPL-MCNC: 7.5 MG/DL (ref 0.5–1.4)
CREAT UR-MCNC: 24 MG/DL (ref 23–375)
CREAT UR-MCNC: 24 MG/DL (ref 23–375)
CV ECHO LV RWT: 0.47 CM
DIFFERENTIAL METHOD: ABNORMAL
DOP CALC AO PEAK VEL: 1.58 M/S
DOP CALC AO VTI: 25.45 CM
DOP CALC LVOT AREA: 3.4 CM2
DOP CALC LVOT DIAMETER: 2.07 CM
DOP CALC LVOT PEAK VEL: 1.04 M/S
DOP CALC LVOT STROKE VOLUME: 59.27 CM3
DOP CALC MV VTI: 23.31 CM
DOP CALCLVOT PEAK VEL VTI: 17.62 CM
E WAVE DECELERATION TIME: 167.53 MSEC
E/A RATIO: 2.27
E/E' RATIO: 12.89 M/S
ECHO LV POSTERIOR WALL: 1.38 CM (ref 0.6–1.1)
EJECTION FRACTION: 60 %
EOSINOPHIL # BLD AUTO: 0.2 K/UL (ref 0–0.5)
EOSINOPHIL NFR BLD: 2 % (ref 0–8)
ERYTHROCYTE [DISTWIDTH] IN BLOOD BY AUTOMATED COUNT: 15.8 % (ref 11.5–14.5)
EST. GFR  (NO RACE VARIABLE): 9 ML/MIN/1.73 M^2
ESTIMATED AVG GLUCOSE: 111 MG/DL (ref 68–131)
ETHANOL UR-MCNC: <10 MG/DL
FRACTIONAL SHORTENING: 35 % (ref 28–44)
GLUCOSE SERPL-MCNC: 111 MG/DL (ref 70–110)
HBA1C MFR BLD: 5.5 % (ref 4–5.6)
HCT VFR BLD AUTO: 32.6 % (ref 40–54)
HDLC SERPL-MCNC: 39 MG/DL (ref 40–75)
HDLC SERPL: 27.7 % (ref 20–50)
HGB BLD-MCNC: 10.5 G/DL (ref 14–18)
IMM GRANULOCYTES # BLD AUTO: 0.02 K/UL (ref 0–0.04)
IMM GRANULOCYTES NFR BLD AUTO: 0.3 % (ref 0–0.5)
INTERVENTRICULAR SEPTUM: 1.26 CM (ref 0.6–1.1)
IRON SERPL-MCNC: 30 UG/DL (ref 45–160)
LA MAJOR: 6.94 CM
LA MINOR: 6.46 CM
LA WIDTH: 5.06 CM
LDLC SERPL CALC-MCNC: 88.8 MG/DL (ref 63–159)
LEFT ATRIUM SIZE: 5.79 CM
LEFT ATRIUM VOLUME INDEX: 63.6 ML/M2
LEFT ATRIUM VOLUME: 166.63 CM3
LEFT INTERNAL DIMENSION IN SYSTOLE: 3.79 CM (ref 2.1–4)
LEFT VENTRICLE DIASTOLIC VOLUME INDEX: 64.4 ML/M2
LEFT VENTRICLE DIASTOLIC VOLUME: 168.73 ML
LEFT VENTRICLE MASS INDEX: 130 G/M2
LEFT VENTRICLE SYSTOLIC VOLUME INDEX: 23.5 ML/M2
LEFT VENTRICLE SYSTOLIC VOLUME: 61.63 ML
LEFT VENTRICULAR INTERNAL DIMENSION IN DIASTOLE: 5.83 CM (ref 3.5–6)
LEFT VENTRICULAR MASS: 341.3 G
LV LATERAL E/E' RATIO: 11.6 M/S
LV SEPTAL E/E' RATIO: 14.5 M/S
LYMPHOCYTES # BLD AUTO: 0.8 K/UL (ref 1–4.8)
LYMPHOCYTES NFR BLD: 10.7 % (ref 18–48)
MAGNESIUM SERPL-MCNC: 1.9 MG/DL (ref 1.6–2.6)
MCH RBC QN AUTO: 28.2 PG (ref 27–31)
MCHC RBC AUTO-ENTMCNC: 32.2 G/DL (ref 32–36)
MCV RBC AUTO: 87 FL (ref 82–98)
METHADONE UR QL SCN>300 NG/ML: NEGATIVE
MONOCYTES # BLD AUTO: 1 K/UL (ref 0.3–1)
MONOCYTES NFR BLD: 13.4 % (ref 4–15)
MV MEAN GRADIENT: 1 MMHG
MV PEAK A VEL: 0.51 M/S
MV PEAK E VEL: 1.16 M/S
MV PEAK GRADIENT: 6 MMHG
MV STENOSIS PRESSURE HALF TIME: 49.94 MS
MV VALVE AREA BY CONTINUITY EQUATION: 2.54 CM2
MV VALVE AREA P 1/2 METHOD: 4.41 CM2
NEUTROPHILS # BLD AUTO: 5.4 K/UL (ref 1.8–7.7)
NEUTROPHILS NFR BLD: 73.3 % (ref 38–73)
NONHDLC SERPL-MCNC: 102 MG/DL
NRBC BLD-RTO: 0 /100 WBC
OPIATES UR QL SCN: NEGATIVE
PCP UR QL SCN>25 NG/ML: NEGATIVE
PHOSPHATE SERPL-MCNC: 6 MG/DL (ref 2.7–4.5)
PISA MRMAX VEL: 0.06 M/S
PISA RADIUS: 0.29 CM
PISA TR MAX VEL: 3.15 M/S
PISA TR VN NYQUIST: 0 M/S
PLATELET # BLD AUTO: 360 K/UL (ref 150–450)
PMV BLD AUTO: 9.9 FL (ref 9.2–12.9)
POTASSIUM SERPL-SCNC: 3 MMOL/L (ref 3.5–5.1)
PROT SERPL-MCNC: 5.9 G/DL (ref 6–8.4)
PV PEAK VELOCITY: 0.86 CM/S
RA MAJOR: 7.45 CM
RA PRESSURE: 8 MMHG
RA WIDTH: 4.54 CM
RBC # BLD AUTO: 3.73 M/UL (ref 4.6–6.2)
RIGHT VENTRICULAR END-DIASTOLIC DIMENSION: 4.3 CM
RV TISSUE DOPPLER FREE WALL SYSTOLIC VELOCITY 1 (APICAL 4 CHAMBER VIEW): 9.9 CM/S
SATURATED IRON: 8 % (ref 20–50)
SODIUM SERPL-SCNC: 135 MMOL/L (ref 136–145)
TDI LATERAL: 0.1 M/S
TDI SEPTAL: 0.08 M/S
TDI: 0.09 M/S
TOTAL IRON BINDING CAPACITY: 382 UG/DL (ref 250–450)
TOXICOLOGY INFORMATION: NORMAL
TR MAX PG: 40 MMHG
TRANSFERRIN SERPL-MCNC: 258 MG/DL (ref 200–375)
TRICUSPID ANNULAR PLANE SYSTOLIC EXCURSION: 1.45 CM
TRIGL SERPL-MCNC: 66 MG/DL (ref 30–150)
TROPONIN I SERPL DL<=0.01 NG/ML-MCNC: 0.43 NG/ML (ref 0–0.03)
TV REST PULMONARY ARTERY PRESSURE: 48 MMHG
UUN UR-MCNC: 181 MG/DL (ref 140–1050)
WBC # BLD AUTO: 7.41 K/UL (ref 3.9–12.7)

## 2022-10-11 PROCEDURE — 84100 ASSAY OF PHOSPHORUS: CPT | Performed by: STUDENT IN AN ORGANIZED HEALTH CARE EDUCATION/TRAINING PROGRAM

## 2022-10-11 PROCEDURE — 63600175 PHARM REV CODE 636 W HCPCS: Performed by: NURSE PRACTITIONER

## 2022-10-11 PROCEDURE — 36415 COLL VENOUS BLD VENIPUNCTURE: CPT | Performed by: STUDENT IN AN ORGANIZED HEALTH CARE EDUCATION/TRAINING PROGRAM

## 2022-10-11 PROCEDURE — 80053 COMPREHEN METABOLIC PANEL: CPT | Performed by: STUDENT IN AN ORGANIZED HEALTH CARE EDUCATION/TRAINING PROGRAM

## 2022-10-11 PROCEDURE — 36415 COLL VENOUS BLD VENIPUNCTURE: CPT | Performed by: INTERNAL MEDICINE

## 2022-10-11 PROCEDURE — 82330 ASSAY OF CALCIUM: CPT | Performed by: STUDENT IN AN ORGANIZED HEALTH CARE EDUCATION/TRAINING PROGRAM

## 2022-10-11 PROCEDURE — 99223 1ST HOSP IP/OBS HIGH 75: CPT | Mod: 25,,, | Performed by: GENERAL PRACTICE

## 2022-10-11 PROCEDURE — 25000003 PHARM REV CODE 250: Performed by: STUDENT IN AN ORGANIZED HEALTH CARE EDUCATION/TRAINING PROGRAM

## 2022-10-11 PROCEDURE — 84484 ASSAY OF TROPONIN QUANT: CPT | Performed by: STUDENT IN AN ORGANIZED HEALTH CARE EDUCATION/TRAINING PROGRAM

## 2022-10-11 PROCEDURE — 83735 ASSAY OF MAGNESIUM: CPT | Performed by: STUDENT IN AN ORGANIZED HEALTH CARE EDUCATION/TRAINING PROGRAM

## 2022-10-11 PROCEDURE — 99223 PR INITIAL HOSPITAL CARE,LEVL III: ICD-10-PCS | Mod: 25,,, | Performed by: GENERAL PRACTICE

## 2022-10-11 PROCEDURE — 82306 VITAMIN D 25 HYDROXY: CPT | Performed by: INTERNAL MEDICINE

## 2022-10-11 PROCEDURE — 85025 COMPLETE CBC W/AUTO DIFF WBC: CPT | Performed by: STUDENT IN AN ORGANIZED HEALTH CARE EDUCATION/TRAINING PROGRAM

## 2022-10-11 PROCEDURE — 25000003 PHARM REV CODE 250: Performed by: INTERNAL MEDICINE

## 2022-10-11 PROCEDURE — 63600175 PHARM REV CODE 636 W HCPCS: Performed by: STUDENT IN AN ORGANIZED HEALTH CARE EDUCATION/TRAINING PROGRAM

## 2022-10-11 PROCEDURE — 80061 LIPID PANEL: CPT | Performed by: STUDENT IN AN ORGANIZED HEALTH CARE EDUCATION/TRAINING PROGRAM

## 2022-10-11 PROCEDURE — 25000003 PHARM REV CODE 250: Performed by: NURSE PRACTITIONER

## 2022-10-11 PROCEDURE — 11000001 HC ACUTE MED/SURG PRIVATE ROOM

## 2022-10-11 RX ORDER — CALCIUM CARBONATE 200(500)MG
1000 TABLET,CHEWABLE ORAL
Status: DISCONTINUED | OUTPATIENT
Start: 2022-10-11 | End: 2022-10-18 | Stop reason: HOSPADM

## 2022-10-11 RX ORDER — ISOSORBIDE MONONITRATE 60 MG/1
60 TABLET, EXTENDED RELEASE ORAL DAILY
Status: DISCONTINUED | OUTPATIENT
Start: 2022-10-11 | End: 2022-10-12

## 2022-10-11 RX ORDER — CALCIUM GLUCONATE 20 MG/ML
1 INJECTION, SOLUTION INTRAVENOUS ONCE
Status: COMPLETED | OUTPATIENT
Start: 2022-10-11 | End: 2022-10-11

## 2022-10-11 RX ORDER — HYDRALAZINE HYDROCHLORIDE 25 MG/1
100 TABLET, FILM COATED ORAL EVERY 8 HOURS
Status: DISCONTINUED | OUTPATIENT
Start: 2022-10-11 | End: 2022-10-17

## 2022-10-11 RX ORDER — LIDOCAINE 50 MG/G
1 PATCH TOPICAL
Status: DISCONTINUED | OUTPATIENT
Start: 2022-10-11 | End: 2022-10-18 | Stop reason: HOSPADM

## 2022-10-11 RX ORDER — MORPHINE SULFATE 4 MG/ML
4 INJECTION, SOLUTION INTRAMUSCULAR; INTRAVENOUS ONCE
Status: COMPLETED | OUTPATIENT
Start: 2022-10-11 | End: 2022-10-11

## 2022-10-11 RX ORDER — ACETAMINOPHEN 500 MG
1000 TABLET ORAL EVERY 8 HOURS PRN
Status: DISCONTINUED | OUTPATIENT
Start: 2022-10-11 | End: 2022-10-18 | Stop reason: HOSPADM

## 2022-10-11 RX ORDER — POTASSIUM CHLORIDE 20 MEQ/1
40 TABLET, EXTENDED RELEASE ORAL ONCE
Status: COMPLETED | OUTPATIENT
Start: 2022-10-11 | End: 2022-10-11

## 2022-10-11 RX ORDER — SEVELAMER CARBONATE 800 MG/1
800 TABLET, FILM COATED ORAL
Status: DISCONTINUED | OUTPATIENT
Start: 2022-10-11 | End: 2022-10-16

## 2022-10-11 RX ORDER — ACETAMINOPHEN 500 MG
5000 TABLET ORAL DAILY
Status: DISCONTINUED | OUTPATIENT
Start: 2022-10-11 | End: 2022-10-18 | Stop reason: HOSPADM

## 2022-10-11 RX ORDER — FUROSEMIDE 10 MG/ML
40 INJECTION INTRAMUSCULAR; INTRAVENOUS
Status: DISCONTINUED | OUTPATIENT
Start: 2022-10-11 | End: 2022-10-12

## 2022-10-11 RX ADMIN — LIDOCAINE 1 PATCH: 50 PATCH CUTANEOUS at 12:10

## 2022-10-11 RX ADMIN — ACETAMINOPHEN 650 MG: 325 TABLET ORAL at 09:10

## 2022-10-11 RX ADMIN — CALCIUM CARBONATE (ANTACID) CHEW TAB 500 MG 1000 MG: 500 CHEW TAB at 05:10

## 2022-10-11 RX ADMIN — HEPARIN SODIUM 5000 UNITS: 5000 INJECTION INTRAVENOUS; SUBCUTANEOUS at 02:10

## 2022-10-11 RX ADMIN — HEPARIN SODIUM 5000 UNITS: 5000 INJECTION INTRAVENOUS; SUBCUTANEOUS at 05:10

## 2022-10-11 RX ADMIN — FUROSEMIDE 80 MG: 10 INJECTION, SOLUTION INTRAMUSCULAR; INTRAVENOUS at 05:10

## 2022-10-11 RX ADMIN — ISOSORBIDE MONONITRATE 60 MG: 60 TABLET, EXTENDED RELEASE ORAL at 12:10

## 2022-10-11 RX ADMIN — CHOLECALCIFEROL TAB 125 MCG (5000 UNIT) 5000 UNITS: 125 TAB at 05:10

## 2022-10-11 RX ADMIN — HYDRALAZINE HYDROCHLORIDE 100 MG: 25 TABLET, FILM COATED ORAL at 02:10

## 2022-10-11 RX ADMIN — NICARDIPINE HYDROCHLORIDE 4 MG/HR: 0.2 INJECTION, SOLUTION INTRAVENOUS at 06:10

## 2022-10-11 RX ADMIN — CALCIUM GLUCONATE 1 G: 20 INJECTION, SOLUTION INTRAVENOUS at 09:10

## 2022-10-11 RX ADMIN — SEVELAMER CARBONATE 800 MG: 800 TABLET, FILM COATED ORAL at 05:10

## 2022-10-11 RX ADMIN — FUROSEMIDE 40 MG: 10 INJECTION, SOLUTION INTRAMUSCULAR; INTRAVENOUS at 05:10

## 2022-10-11 RX ADMIN — MORPHINE SULFATE 4 MG: 4 INJECTION INTRAVENOUS at 01:10

## 2022-10-11 RX ADMIN — NICARDIPINE HYDROCHLORIDE 2.5 MG/HR: 0.2 INJECTION, SOLUTION INTRAVENOUS at 01:10

## 2022-10-11 RX ADMIN — ACETAMINOPHEN 1000 MG: 500 TABLET ORAL at 06:10

## 2022-10-11 RX ADMIN — AMLODIPINE BESYLATE 10 MG: 5 TABLET ORAL at 08:10

## 2022-10-11 RX ADMIN — POTASSIUM CHLORIDE 40 MEQ: 1500 TABLET, EXTENDED RELEASE ORAL at 09:10

## 2022-10-11 RX ADMIN — HEPARIN SODIUM 5000 UNITS: 5000 INJECTION INTRAVENOUS; SUBCUTANEOUS at 09:10

## 2022-10-11 RX ADMIN — HYDRALAZINE HYDROCHLORIDE 100 MG: 25 TABLET, FILM COATED ORAL at 09:10

## 2022-10-11 NOTE — ASSESSMENT & PLAN NOTE
-Nephrology consulted  -Urine sodium, urea, creatinine  -Renal ultrasound  -Monitor UOP and electrolytes  -IV Lasix diuresis  -Trend creatinine

## 2022-10-11 NOTE — PROGRESS NOTES
Nephrology Consult Note        Patient Name: João Ham  MRN: [id number]    Patient Class: IP- Inpatient   Admission Date: 10/10/2022  Length of Stay: 0 days  Date of Service: 10/10/2022    Attending Physician: Jono Knowles MD  Primary Care Provider: No primary care provider on file.    Reason for Consult: clarke/ckd/uremia/acidosis/chf/htn/anemia    SUBJECTIVE:     HPI: 42M with little previous follow-up but a PMHx of treated HTN presents to the ED with progressive leg swelling, scrotal swelling, abdominal distention, and SOB over approximately 2 weeks with 1 episode of racing heart. Patient reports he has a good walk from his vehicle to his office at work and notes he had to stop to catch his breath. He denies prior similar episodes. He stopped his Amlodipine, Valsartin and HCTZ a week ago because he thought it was contributing to his swelling. Per my discussion with Dr. Fox, agree with cards eval and diuretic due to uncontrolled HTN,as well as renal US and UA. I added Phos, Mg, PTH, am vitamin D levels in AM to evaluate further.    10/11 VSS. Renal US is unremarkable, UA is bland. Vitamin D is low and PTH is high, add oral D3 and oral calcium supplement. Agree with cards recommendations and med changes.    No past medical history on file.  No past surgical history on file.  No family history on file.       Review of patient's allergies indicates:  No Known Allergies    Outpatient meds:  No current facility-administered medications on file prior to encounter.     No current outpatient medications on file prior to encounter.       Scheduled meds:   calcium gluconate IVPB  1 g Intravenous Once    furosemide (LASIX) injection  80 mg Intravenous Q12H       Infusions:   niCARdipine         PRN meds:      Review of Systems:    OBJECTIVE:     Vital Signs and IO:  Temp:  [98.5 °F (36.9 °C)]   Pulse:  []   Resp:  [16-20]   BP: (160-214)/(117-154)   SpO2:  [96 %-100 %]   No intake/output data recorded.  Wt Readings  from Last 5 Encounters:   10/10/22 117.9 kg (260 lb)     Body mass index is 33.38 kg/m².    Physical Exam  Constitutional:       General: He is not in acute distress.     Appearance: He is well-developed. He is not diaphoretic.   HENT:      Head: Normocephalic and atraumatic.      Mouth/Throat:      Mouth: Mucous membranes are moist.   Eyes:      General: No scleral icterus.     Pupils: Pupils are equal, round, and reactive to light.   Cardiovascular:      Rate and Rhythm: Normal rate and regular rhythm.   Pulmonary:      Effort: Pulmonary effort is normal. No respiratory distress.      Breath sounds: No stridor.   Abdominal:      General: There is distension.      Palpations: Abdomen is soft.   Musculoskeletal:         General: Swelling present. No deformity. Normal range of motion.      Cervical back: Neck supple.   Skin:     General: Skin is warm and dry.      Findings: No erythema or rash.   Neurological:      Mental Status: He is alert and oriented to person, place, and time.      Cranial Nerves: No cranial nerve deficit.   Psychiatric:         Behavior: Behavior normal.     Laboratory:  Recent Labs   Lab 10/10/22  1452      K 3.5      CO2 17*   BUN 80*   CREATININE 7.9*          Recent Labs   Lab 10/10/22  1452   CALCIUM 6.7*   ALBUMIN 2.7*             No results for input(s): POCTGLUCOSE in the last 168 hours.          Recent Labs   Lab 10/10/22  1452   WBC 8.10   HGB 10.7*   HCT 31.6*      MCV 87   MCHC 33.9   MONO 12.0  1.0       Recent Labs   Lab 10/10/22  1452   BILITOT 0.6   PROT 5.8*   ALBUMIN 2.7*   ALKPHOS 119   ALT 94*   AST 39       Recent Labs   Lab 10/10/22  1645   Color, UA Yellow   Appearance, UA Clear   pH, UA 6.0   Specific Gravity, UA 1.025   Protein, UA 2+ A   Glucose, UA Negative   Ketones, UA Negative   Urobilinogen, UA Negative   Bilirubin (UA) Negative   Occult Blood UA 1+ A   Nitrite, UA Negative   RBC, UA 1   WBC, UA 0   Bacteria None   Hyaline Casts, UA 0              Microbiology Results (last 7 days)       ** No results found for the last 168 hours. **            ASSESSMENT/PLAN:     Non-oliguric GORGE and/or CKD stage ?  Acidosis  Uremia  Fluid overload  CHF  No NSAIDs, ACEI/ARB, IV contrast or other nephrotoxins.  Keep MAP > 60, SBP > 100.  Dose meds for GFR < 30 ml/min.  Renal diet - low K, low phos.  UA is bland.  Agree with cards eval.  No emergency dialysis needs.    Anemia of CKD  Hgb and HCT are acceptable. Monitor for now.  Will provide LOR and/or IV iron PRN.    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 CKD-MBD vs reactive) and vitamin D levels are low.   Start oral D3 and Ca supplement.    HTN  Fluid overload  BP seem high and uncontrolled.   Tolerate asymptomatic HTN up to -160.  Low sodium diet.  Agree with lasix IV, niseritide gtt and Cards recommendations.    Thank you for allowing us to participate in the care of your patient!   We will follow the patient and provide recommendations as needed.    Patient care time was spent personally by me on the following activities:     Obtaining a history.  Examination of patient.  Providing medical care at the patients bedside.  Developing a treatment plan with patient or surrogate and bedside caregivers.  Ordering and reviewing laboratory studies, radiographic studies, pulse oximetry.  Ordering and performing treatments and interventions.  Evaluation of patient's response to treatment.  Discussions with consultants while on the unit and immediately available to the patient.  Re-evaluation of the patient's condition.  Documentation in the medical record.     Jeremy Madrid MD    [facility]  [address] [place] [id number] [phone] - tel  [phone] - fax    10/10/2022

## 2022-10-11 NOTE — NURSING
Up to bathroom to void, steady on feet, denies weakness. Resting in bed, dinner tray provided. No complaints voiced.

## 2022-10-11 NOTE — ASSESSMENT & PLAN NOTE
-cardene infusion  -Trend troponin  -Nephrology consulted for GORGE  -Cardiology consulted  -Telemetry

## 2022-10-11 NOTE — PLAN OF CARE
Problem: Fluid and Electrolyte Imbalance (Acute Kidney Injury/Impairment)  Goal: Fluid and Electrolyte Balance  Outcome: Ongoing, Progressing     Problem: Oral Intake Inadequate (Acute Kidney Injury/Impairment)  Goal: Optimal Nutrition Intake  Outcome: Ongoing, Progressing     Problem: Renal Function Impairment (Acute Kidney Injury/Impairment)  Goal: Effective Renal Function  Outcome: Ongoing, Progressing     Patient remains free of injury/fall. C/o cramping to BLE and right side of chest. Lidocaine patch applied. Monitoring BP. Cardene currently infusing at 5mg/hr. Strict I&O.

## 2022-10-11 NOTE — SUBJECTIVE & OBJECTIVE
"Interval History: see "Hospital Course"    Review of Systems   Constitutional:  Positive for activity change, appetite change and fatigue. Negative for fever.   HENT: Negative.     Eyes: Negative.    Respiratory:  Positive for shortness of breath.    Cardiovascular:  Positive for leg swelling.   Gastrointestinal:  Positive for abdominal distention, constipation, diarrhea, nausea and vomiting. Negative for abdominal pain.   Endocrine: Negative.    Genitourinary:  Positive for scrotal swelling. Negative for difficulty urinating, flank pain and frequency.   Musculoskeletal: Negative.    Skin: Negative.    Allergic/Immunologic: Negative.    Neurological: Negative.    Hematological: Negative.    Psychiatric/Behavioral:  Positive for decreased concentration.    Objective:     Vital Signs (Most Recent):  Temp: 98.2 °F (36.8 °C) (10/11/22 0445)  Pulse: 90 (10/11/22 0747)  Resp: 18 (10/11/22 0148)  BP: (!) 162/98 (10/11/22 0747)  SpO2: 97 % (10/11/22 0747)   Vital Signs (24h Range):  Temp:  [97 °F (36.1 °C)-98.5 °F (36.9 °C)] 98.2 °F (36.8 °C)  Pulse:  [] 90  Resp:  [16-20] 18  SpO2:  [93 %-100 %] 97 %  BP: (117-214)/() 162/98     Weight: (!) 140.7 kg (310 lb 3 oz)  Body mass index is 39.83 kg/m².    Intake/Output Summary (Last 24 hours) at 10/11/2022 0902  Last data filed at 10/11/2022 0837  Gross per 24 hour   Intake 508.6 ml   Output 5300 ml   Net -4791.4 ml      Physical Exam  Vitals and nursing note reviewed.   Constitutional:       General: He is not in acute distress.     Appearance: He is obese. He is ill-appearing.      Comments: Anasarca.   HENT:      Head: Normocephalic and atraumatic.      Right Ear: External ear normal.      Left Ear: External ear normal.      Nose: Nose normal.      Mouth/Throat:      Mouth: Mucous membranes are moist.      Pharynx: Oropharynx is clear.   Eyes:      Extraocular Movements: Extraocular movements intact.      Conjunctiva/sclera: Conjunctivae normal.   Cardiovascular: "      Rate and Rhythm: Normal rate and regular rhythm.      Pulses: Normal pulses.      Heart sounds: Normal heart sounds.   Pulmonary:      Effort: Pulmonary effort is normal. No respiratory distress.      Breath sounds: Rales present.   Abdominal:      General: Bowel sounds are normal. There is no distension.      Palpations: Abdomen is soft.      Tenderness: There is no abdominal tenderness. There is no right CVA tenderness or left CVA tenderness.   Genitourinary:     Comments: Scrotal swelling.  Musculoskeletal:         General: Normal range of motion.      Cervical back: Normal range of motion and neck supple.      Right lower leg: Edema present.      Left lower leg: Edema present.   Skin:     General: Skin is warm and dry.   Neurological:      General: No focal deficit present.      Mental Status: He is alert and oriented to person, place, and time. Mental status is at baseline.   Psychiatric:         Mood and Affect: Mood normal.         Behavior: Behavior normal.       Significant Labs: All pertinent labs within the past 24 hours have been reviewed.    Significant Imaging: I have reviewed all pertinent imaging results/findings within the past 24 hours.

## 2022-10-11 NOTE — CONSULTS
[facility]  Department of Cardiology  Consult Note      PATIENT NAME: João Ham    MRN: [id number]  TODAY'S DATE: 10/11/2022  ADMIT DATE: 10/10/2022                          CONSULT REQUESTED BY: Jono Knowles MD    SUBJECTIVE     PRINCIPAL PROBLEM: Acute renal failure      REASON FOR CONSULT:  Malignant htn chf      HPI:  ER NOTE   João Ham is a 42 year old male with a past medical history of HTN, obesity, and anemia who presents with multiple weeks of worsening swelling to the legs and scrotum associated with worsening shortness of breath. The patient thought that the swelling possibly was secondary to his blood pressure medication, so he stopped taking these medications multiple days ago. He denies any chest pain or palpitations. He denies any drug, tobacco or alcohol use. He has never experienced swelling of this severity before. He denies any issues with urination. He does endorse fatigue, changes in appetite and nausea and vomiting. While in the ED, the patient was given IV calcium, Dilaudid, and labetalol. A Cardene infusion was also started. Hospital Medicine was consulted for admission.     PATIENT REPORTS HE HAS NOT TAKEN HIS MEDICATIONS FOR TO 3 WEEKS.      Review of patient's allergies indicates:  No Known Allergies    Past Medical History:   Diagnosis Date    Hypertension      No past surgical history on file.        REVIEW OF SYSTEMS  CONSTITUTIONAL: Negative for chills, fatigue and fever.   EYES: No double vision, No blurred vision  NEURO: No headaches, No dizziness  RESPIRATORY: Negative for cough, shortness of breath and wheezing.    CARDIOVASCULAR: Negative for chest pain. Negative for palpitations and leg swelling.   GI: Negative for abdominal pain, No melena, diarrhea, nausea and vomiting.   : Negative for dysuria and frequency, Negative for hematuria  SKIN: Negative for bruising, Negative for edema or discoloration noted.   ENDOCRINE: Negative for polyphagia, Negative for  heat intolerance, Negative for cold intolerance  PSYCHIATRIC: Negative for depression, Negative for anxiety, Negative for memory loss  MUSCULOSKELETAL: Negative for neck pain, Negative for muscle weakness, Negative for back pain     OBJECTIVE     VITAL SIGNS (Most Recent)  Temp: 97.4 °F (36.3 °C) (10/11/22 1619)  Pulse: 102 (10/11/22 1832)  Resp: 20 (10/11/22 1150)  BP: (!) 169/88 (10/11/22 1832)  SpO2: 96 % (10/11/22 1832)    VENTILATION STATUS  Resp: 20 (10/11/22 1150)  SpO2: 96 % (10/11/22 1832)       I & O (Last 24H):  Intake/Output Summary (Last 24 hours) at 10/11/2022 1917  Last data filed at 10/11/2022 1853  Gross per 24 hour   Intake 1748.6 ml   Output 9275 ml   Net -7526.4 ml       WEIGHTS  Wt Readings from Last 1 Encounters:   10/11/22 0902 (!) 140.6 kg (310 lb)   10/10/22 2015 (!) 140.7 kg (310 lb 3 oz)   10/10/22 1340 117.9 kg (260 lb)       PHYSICAL EXAM  GENERAL: well built, well nourished, well-developed in no apparent distress alert and oriented.   HEENT: Normocephalic. Pupils normal and conjunctivae normal.  Mucous membranes normal, no cyanosis or icterus, trachea central,no pallor or icterus is noted..   NECK: No JVD. No bruit..   THYROID: Thyroid not enlarged. No nodules present..   CARDIAC: Regular rate and rhythm. S1 is normal.S2 is normal.No gallops, clicks or murmurs noted at this time.  CHEST ANATOMY: normal.   LUNGS: Clear to auscultation. No wheezing or rhonchi..   ABDOMEN: Soft no masses or organomegaly.  No abdomen pulsations or bruits.  Normal bowel sounds. No pulsations and no masses felt, No guarding or rebound.   URINARY: No ying catheter   EXTREMITIES: No cyanosis, clubbing or edema noted at this time., no calf tenderness bilaterally.   PERIPHERAL VASCULAR SYSTEM: Good palpable distal pulses.   CENTRAL NERVOUS SYSTEM: No focal motor or sensory deficits noted.   SKIN: Skin without lesions, moist, well perfused.   MUSCLE STRENGTH & TONE: No noteable weakness, atrophy or abnormal  movement.     HOME MEDICATIONS:  No current facility-administered medications on file prior to encounter.     Current Outpatient Medications on File Prior to Encounter   Medication Sig Dispense Refill    amlodipine-valsartan (EXFORGE)  mg per tablet Take 1 tablet by mouth once daily.      hydroCHLOROthiazide (HYDRODIURIL) 25 MG tablet Take 25 mg by mouth once daily.         SCHEDULED MEDS:   calcium carbonate  1,000 mg Oral TID WM    cholecalciferol (vitamin D3)  5,000 Units Oral Daily    furosemide (LASIX) injection  40 mg Intravenous Q12H    heparin (porcine)  5,000 Units Subcutaneous Q8H    hydrALAZINE  100 mg Oral Q8H    isosorbide mononitrate  60 mg Oral Daily    LIDOcaine  1 patch Transdermal Q24H    sevelamer carbonate  800 mg Oral TID WM       CONTINUOUS INFUSIONS:   niCARdipine 4 mg/hr (10/11/22 1851)       PRN MEDS:acetaminophen, hydrALAZINE, labetaloL, naloxone, sodium chloride 0.9%    LABS AND DIAGNOSTICS     CBC LAST 3 DAYS  Recent Labs   Lab 10/10/22  1452 10/11/22  0512   WBC 8.10 7.41   RBC 3.64* 3.73*   HGB 10.7* 10.5*   HCT 31.6* 32.6*   MCV 87 87   MCH 29.4 28.2   MCHC 33.9 32.2   RDW 15.9* 15.8*    360   MPV 10.3 9.9   GRAN 74.2*  6.0 73.3*  5.4   LYMPH 12.1*  1.0 10.7*  0.8*   MONO 12.0  1.0 13.4  1.0   BASO 0.03 0.02   NRBC 0 0       COAGULATION LAST 3 DAYS  Recent Labs   Lab 10/10/22  2005   INR 1.1   APTT 24.5       CHEMISTRY LAST 3 DAYS  Recent Labs   Lab 10/10/22  1452 10/11/22  0512    135*   K 3.5 3.0*    99   CO2 17* 20*   ANIONGAP 17* 16   BUN 80* 77*   CREATININE 7.9* 7.5*    111*   CALCIUM 6.7* 6.8*   MG 2.0 1.9   ALBUMIN 2.7* 2.8*   PROT 5.8* 5.9*   ALKPHOS 119 107   ALT 94* 86*   AST 39 22   BILITOT 0.6 0.7       CARDIAC PROFILE LAST 3 DAYS  Recent Labs   Lab 10/10/22  1452 10/10/22  1738 10/10/22  2005 10/11/22  0044   BNP 3,678*  --   --   --    TROPONINI 0.476* 0.481* 0.472* 0.427*       ENDOCRINE LAST 3 DAYS  Recent Labs   Lab 10/10/22  2005    TSH 1.699       LAST ARTERIAL BLOOD GAS  ABG  No results for input(s): PH, PO2, PCO2, HCO3, BE in the last 168 hours.    LAST 7 DAYS MICROBIOLOGY   Microbiology Results (last 7 days)       ** No results found for the last 168 hours. **            MOST RECENT IMAGING  Echo  · The estimated PA systolic pressure is 48 mmHg.  · The left ventricle is normal in size with mild concentric hypertrophy   and normal systolic function.  · Grade III left ventricular diastolic dysfunction.  · Moderate right ventricular enlargement with mildly to moderately reduced   right ventricular systolic function.  · Severe left atrial enlargement.  · There is moderate pulmonary hypertension.  · Intermediate central venous pressure (8 mmHg).  · Mild-to-moderate mitral regurgitation.  · Moderate to severe tricuspid regurgitation.  · Moderate right atrial enlargement.  · The estimated ejection fraction is 60%.  · Atrial fibrillation not observed.         ECHOCARDIOGRAM RESULTS (last 5)  No results found for this or any previous visit.      CURRENT/PREVIOUS VISIT EKG  Results for orders placed or performed during the hospital encounter of 10/10/22   EKG 12-lead    Collection Time: 10/10/22  1:52 PM    Narrative    Test Reason : R06.02,    Vent. Rate : 102 BPM     Atrial Rate : 102 BPM     P-R Int : 132 ms          QRS Dur : 098 ms      QT Int : 382 ms       P-R-T Axes : 079 -65 085 degrees     QTc Int : 497 ms    Sinus tachycardia  Right atrial enlargement  Left anterior fascicular block  Cannot rule out Anterior infarct ,age undetermined  Abnormal ECG  When compared with ECG of 10-OCT-2022 13:49,  Previous ECG has undetermined rhythm, needs review    Referred By: [email or web address]   SELF           Confirmed By:            ASSESSMENT/PLAN:     Active Hospital Problems    Diagnosis    *Acute renal failure    Hypertensive emergency    Anemia    Hypocalcemia    Acidosis, metabolic    Elevated LFTs    Hypoalbuminemia    Obesity (BMI 30-39.9)    Elevated  troponin    Elevated brain natriuretic peptide (BNP) level       ASSESSMENT & PLAN:    MALIGNANT HYPERTENSION  CONGESTIVE HEART FAILURE PRESERVED EJECTION FRACTION  ACUTE KIDNEY INJURY  ANEMIA  RECOMMENDATIONS:  CONTINUE CARDENE DRIP AND IV LASIX  ECHO SHOWS PRESERVED EJECTION FRACTION LVH RIGHT VENTRICULAR ENLARGEMENT  DIASTOLIC DYSFUNCTION TYPE 3      Dawson Garsia MD  [facility]  Department of Cardiology  Date of Service: 10/11/2022  9:34 AM

## 2022-10-11 NOTE — HPI
João Ham is a 42 year old male with a past medical history of HTN, obesity, and anemia who presents with multiple weeks of worsening swelling to the legs and scrotum associated with worsening shortness of breath. The patient thought that the swelling possibly was secondary to his blood pressure medication, so he stopped taking these medications multiple days ago. He denies any chest pain or palpitations. He denies any drug, tobacco or alcohol use. He has never experienced swelling of this severity before. He denies any issues with urination. He does endorse fatigue, changes in appetite and nausea and vomiting. While in the ED, the patient was given IV calcium, Dilaudid, and labetalol. A Cardene infusion was also started. Hospital Medicine was consulted for admission.

## 2022-10-11 NOTE — ASSESSMENT & PLAN NOTE
-Cardene infusion  -Nephrology consulted for GORGE  -Cardiology consulted for elevated troponin  -TTE pending  -Telemetry

## 2022-10-11 NOTE — EICU
Rounding (Video Assessment):  Yes    Intervention Initiated From:  COR / EICU    Yuni Communicated with Bedside Nurse regarding:  Documentation    Comments: Izabella rounding complete, patient states he feels a lot better than what he did when he arrived.  He is requesting something to eat, renal diet ordered. Spoke to RN concerning /100, Cardene ordered but not infusing d/t parameters are only for SBP < 185.  States she has sent message to NP covering unit

## 2022-10-11 NOTE — EICU
"Rounding (Video Assessment):  Yes    Comments: Video assessment completed.  Pt sitting up in bed.  Noted to be AAO.  States he is "feeling much better than yesterday".  Cardene infusion in progress at 2 mg/hr.  NIBP 174/104.  NAD noted.  "

## 2022-10-11 NOTE — NURSING
Patient arrived from ED via wheelchair, ambulated to bed. Oriented to room, orders explained. Call light at bedside. Admit assessment completed. Monitoring VS continuously ..

## 2022-10-11 NOTE — ED NOTES
Patient resting in bed with eyes closed, chest rise is symmetrical, respirations are regular and unlabored. FLORENCIA HERNANDEZ

## 2022-10-11 NOTE — ASSESSMENT & PLAN NOTE
Body mass index is 33.38 kg/m². Morbid obesity complicates all aspects of disease management from diagnostic modalities to treatment.

## 2022-10-11 NOTE — PLAN OF CARE
[facility]  Initial Discharge Assessment       Primary Care Provider: No primary care provider on file.    Admission Diagnosis: SOB (shortness of breath) [R06.02]  Elevated brain natriuretic peptide (BNP) level [R79.89]  Chest pain [R07.9]  Hypertensive emergency [I16.1]    Admission Date: 10/10/2022  Expected Discharge Date:     Discharge Barriers Identified: None    Payor: BLUE CROSS BLUE SHIELD / Plan: BCBS ALL OUT OF STATE / Product Type: PPO /     Extended Emergency Contact Information  Primary Emergency Contact: angelica ross  Mobile Phone: [phone]  Relation: Grandparent  Preferred language: English   needed? No    Discharge Plan A: Home with family  Discharge Plan B: Home    Completed DC assessment with pt at bedside. Verified information on facesheet as correct. Lives at listed address with his nephew. Denies POA. NOK 1 child : Sol Ham [phone]. Does not have PCP- agreeable to CM assisting. Pharm is [facility] on [address]. Denies hh/hd/dme/blood thinners. Independent at baseline and drives himself to apts. Plans to drive himself home upon DC if possible. Verified insurance file. Denies recent inpt stay in last 30 days. DC plan is home. CM following- pt may need dialysis     Initial Assessment (most recent)       Adult Discharge Assessment - 10/11/22 1332          Discharge Assessment    Assessment Type Discharge Planning Assessment     Confirmed/corrected address, phone number and insurance Yes     Confirmed Demographics Correct on Facesheet     Source of Information patient     If unable to respond/provide information was family/caregiver contacted? Yes     Communicated MARIA ISABEL with patient/caregiver Yes     Reason For Admission acute renal failure     Lives With other (see comments)     Facility Arrived From: home     Do you expect to return to your current living situation? Yes     Do you have help at home or someone to help you manage your care at home? No     Prior to  hospitilization cognitive status: Alert/Oriented     Current cognitive status: Alert/Oriented     Walking or Climbing Stairs Difficulty none     Dressing/Bathing Difficulty none     Equipment Currently Used at Home none     Readmission within 30 days? No     Patient currently being followed by outpatient case management? No     Do you currently have service(s) that help you manage your care at home? No     Do you take prescription medications? Yes     Do you have prescription coverage? Yes     Coverage BCBS     Do you have any problems affording any of your prescribed medications? No     Is the patient taking medications as prescribed? yes     Who is going to help you get home at discharge? self     How do you get to doctors appointments? car, drives self     Are you on dialysis? No     Do you take coumadin? No     Discharge Plan A Home with family     Discharge Plan B Home     DME Needed Upon Discharge  none     Discharge Plan discussed with: Patient     Discharge Barriers Identified None        Physical Activity    On average, how many days per week do you engage in moderate to strenuous exercise (like a brisk walk)? 3 days     On average, how many minutes do you engage in exercise at this level? 30 min        Financial Resource Strain    How hard is it for you to pay for the very basics like food, housing, medical care, and heating? Not hard at all        Housing Stability    In the last 12 months, was there a time when you were not able to pay the mortgage or rent on time? No     In the last 12 months, how many places have you lived? 2     In the last 12 months, was there a time when you did not have a steady place to sleep or slept in a shelter (including now)? No        Transportation Needs    In the past 12 months, has lack of transportation kept you from medical appointments or from getting medications? No     In the past 12 months, has lack of transportation kept you from meetings, work, or from getting  things needed for daily living? No        Food Insecurity    Within the past 12 months, you worried that your food would run out before you got the money to buy more. Never true     Within the past 12 months, the food you bought just didn't last and you didn't have money to get more. Never true        Stress    Do you feel stress - tense, restless, nervous, or anxious, or unable to sleep at night because your mind is troubled all the time - these days? Very much        Social Connections    In a typical week, how many times do you talk on the phone with family, friends, or neighbors? More than three times a week     How often do you get together with friends or relatives? Once a week     How often do you attend Religion or Episcopalian services? Never     Do you belong to any clubs or organizations such as Religion groups, unions, fraternal or athletic groups, or school groups? No     How often do you attend meetings of the clubs or organizations you belong to? Never     Are you , , , , never , or living with a partner?         Alcohol Use    Q1: How often do you have a drink containing alcohol? Monthly or less     Q2: How many drinks containing alcohol do you have on a typical day when you are drinking? 1 or 2     Q3: How often do you have six or more drinks on one occasion? Never

## 2022-10-11 NOTE — ASSESSMENT & PLAN NOTE
Renal ultrasound shows no obstructive disease. Urine sodium 91.   -Nephrology consulted  -Monitor UOP and electrolytes  -IV Lasix diuresis  -Trend creatinine  -Renally dose medications  -Avoid nephrotoxic agents; hold home ARB and thiazide diuretic

## 2022-10-11 NOTE — NURSING
Nurses Note -- 4 Eyes      10/10/2022   10:32 PM      Skin assessed during: Admit      [x] No Pressure Injuries Present    []Prevention Measures Documented      [] Yes- Altered Skin Integrity Present or Discovered   [] LDA Added if Not in Epic (Describe Wound)   [] New Altered Skin Integrity was Present on Admit and Documented in LDA   [] Wound Image Taken    Wound Care Consulted? No    Attending Nurse:  Roxana Underwood RN     Second RN/Staff Member:  Reva Handley Rn   ]

## 2022-10-11 NOTE — ED NOTES
Yumiko from Hospital Medicine is aware that patient has a blood pressure of 132/89. Cardene to be discontinued.

## 2022-10-11 NOTE — HOSPITAL COURSE
João Ham is a 42 year old male with a past medical history of HTN, obesity, and anemia who presented with multiple weeks of worsening swelling to the legs and scrotum associated with worsening shortness of breath secondary to hypertensive emergency with acute renal failure, demand ischemia and pulmonary edema. He was started on a Cardene infusion for BP control. He was also started on IV Lasix and metolazone diuresis given volume overload. Nephrology and Cardiology have been consulted. A TTE shows grade III diastolic dysfunction. The patient is also hypocalcemic (likely from renal failure); calcium is being repleted as well as vitamin D; calcitriol has also been started.     Complement levels, as well as anti-GBM and other autoimmune antibodies were negative.  Nephrology was consulted, initially started patient on Cardene drip for which he was weaned off to oral antihypertensives along with the assistance of Cardiology.  Patient underwent renal biopsy on 10/17 without incident.  His blood pressure was controlled at time of discharge.  He was discharged home with home health for help with his multiple medications, as well as follow-up with Nephrology, and primary care.

## 2022-10-11 NOTE — H&P
[facility] Medicine  History & Physical    Patient Name: João Ham  MRN: [id number]  Patient Class: IP- Inpatient  Admission Date: 10/10/2022  Attending Physician: Jono Knowles MD  Primary Care Provider: No primary care provider on file.         Patient information was obtained from patient, past medical records and ER records.     Subjective:     Principal Problem:Acute renal failure    Chief Complaint:   Chief Complaint   Patient presents with    Shortness of Breath     X 2 weeks     Chest Pain    Cough    Leg Swelling     Scrotum swelling         HPI: João Ham is a 42 year old male with a past medical history of HTN, obesity, and anemia who presents with multiple weeks of worsening swelling to the legs and scrotum associated with worsening shortness of breath. The patient thought that the swelling possibly was secondary to his blood pressure medication, so he stopped taking these medications multiple days ago. He denies any chest pain or palpitations. He denies any drug, tobacco or alcohol use. He has never experienced swelling of this severity before. He denies any issues with urination. He does endorse fatigue, changes in appetite and nausea and vomiting. While in the ED, the patient was given IV calcium, Dilaudid, and labetalol. A Cardene infusion was also started. Hospital Medicine was consulted for admission.      No past medical history on file.    No past surgical history on file.    Review of patient's allergies indicates:  No Known Allergies    No current facility-administered medications on file prior to encounter.     No current outpatient medications on file prior to encounter.     Family History    None       Tobacco Use    Smoking status: Not on file    Smokeless tobacco: Not on file   Substance and Sexual Activity    Alcohol use: Not on file    Drug use: Not on file    Sexual activity: Not on file     Review of Systems   Constitutional:  Positive for  activity change, appetite change and fatigue. Negative for fever.   HENT: Negative.     Eyes: Negative.    Gastrointestinal:  Positive for abdominal distention, constipation, diarrhea, nausea and vomiting. Negative for abdominal pain.   Endocrine: Negative.    Genitourinary:  Positive for scrotal swelling. Negative for difficulty urinating, flank pain and frequency.   Musculoskeletal: Negative.    Skin: Negative.    Allergic/Immunologic: Negative.    Neurological: Negative.    Hematological: Negative.    Psychiatric/Behavioral:  Positive for decreased concentration.    Objective:     Vital Signs (Most Recent):  Temp: 98.5 °F (36.9 °C) (10/10/22 1340)  Pulse: 72 (10/10/22 1932)  Resp: 18 (10/10/22 1932)  BP: 132/89 (10/10/22 1932)  SpO2: 95 % (10/10/22 1932) Vital Signs (24h Range):  Temp:  [98.5 °F (36.9 °C)] 98.5 °F (36.9 °C)  Pulse:  [] 72  Resp:  [16-20] 18  SpO2:  [93 %-100 %] 95 %  BP: (117-214)/() 132/89     Weight: 117.9 kg (260 lb)  Body mass index is 33.38 kg/m².    Physical Exam  Vitals and nursing note reviewed.   Constitutional:       General: He is not in acute distress.     Appearance: He is obese. He is ill-appearing.   HENT:      Head: Normocephalic and atraumatic.      Right Ear: External ear normal.      Left Ear: External ear normal.      Nose: Nose normal.      Mouth/Throat:      Mouth: Mucous membranes are moist.      Pharynx: Oropharynx is clear.   Eyes:      Extraocular Movements: Extraocular movements intact.      Conjunctiva/sclera: Conjunctivae normal.   Cardiovascular:      Rate and Rhythm: Normal rate and regular rhythm.      Pulses: Normal pulses.      Heart sounds: Normal heart sounds.   Pulmonary:      Effort: Pulmonary effort is normal. No respiratory distress.      Breath sounds: Rales present.   Abdominal:      General: Bowel sounds are normal. There is no distension.      Palpations: Abdomen is soft.      Tenderness: There is no abdominal tenderness. There is no right  CVA tenderness or left CVA tenderness.   Musculoskeletal:         General: Normal range of motion.      Cervical back: Normal range of motion and neck supple.      Right lower leg: Edema present.      Left lower leg: Edema present.   Skin:     General: Skin is warm and dry.   Neurological:      General: No focal deficit present.      Mental Status: He is alert and oriented to person, place, and time. Mental status is at baseline.   Psychiatric:         Mood and Affect: Mood normal.         Behavior: Behavior normal.         Significant Labs: All pertinent labs within the past 24 hours have been reviewed.    Significant Imaging: I have reviewed all pertinent imaging results/findings within the past 24 hours.    Assessment/Plan:     * Acute renal failure  -Nephrology consulted  -Urine sodium, urea, creatinine  -Renal ultrasound  -Monitor UOP and electrolytes  -IV Lasix diuresis  -Trend creatinine      Hypertensive emergency  -cardene infusion  -Trend troponin  -Nephrology consulted for GORGE  -Cardiology consulted  -Telemetry    Elevated brain natriuretic peptide (BNP) level  -IV Lasix diuresis  -Cardiology consulted  -Patient may need TTE      Elevated troponin  -Telemetry  -Trend troponin  -Cardiology consulted      Obesity (BMI 30-39.9)  Body mass index is 33.38 kg/m². Morbid obesity complicates all aspects of disease management from diagnostic modalities to treatment.      Hypoalbuminemia  -Trend albumin with CMP      Elevated LFTs  Likely secondary to hepatic congestion.  -Trend LFTs with CMP      Acidosis, metabolic  Secondary to renal failure.  -Trend with CMP      Hypocalcemia  -Trend ionized calcium  -Replete PRN  -Check PTH  -Nephrology consulted      Anemia  -Check iron studies  -Trend Hgb with CBC        VTE Risk Mitigation (From admission, onward)         Ordered     heparin (porcine) injection 5,000 Units  Every 8 hours         10/10/22 1955     IP VTE HIGH RISK PATIENT  Once         10/10/22 1955      Place sequential compression device  Until discontinued         10/10/22 1955     Reason for No Pharmacological VTE Prophylaxis  Once        Question:  Reasons:  Answer:  IV Heparin w/in 24 hrs. Pre or Post-Op    10/10/22 1955                   Jono Knowles MD  Department of Hospital Medicine   [facility] - Emergency Dept

## 2022-10-11 NOTE — ED NOTES
Upon transfer to room 221, patient is AAOx4,no cardiac or respiratory complications at this time. Patient's blood pressure is stable. MARY DON

## 2022-10-11 NOTE — PROGRESS NOTES
"[facility] Hospital Medicine  Progress Note    Patient Name: João Ham  MRN: [id number]  Patient Class: IP- Inpatient   Admission Date: 10/10/2022  Length of Stay: 1 days  Attending Physician: Jono Knowles MD  Primary Care Provider: No primary care provider on file.        Subjective:     Principal Problem:Acute renal failure        HPI:  João Ham is a 42 year old male with a past medical history of HTN, obesity, and anemia who presents with multiple weeks of worsening swelling to the legs and scrotum associated with worsening shortness of breath. The patient thought that the swelling possibly was secondary to his blood pressure medication, so he stopped taking these medications multiple days ago. He denies any chest pain or palpitations. He denies any drug, tobacco or alcohol use. He has never experienced swelling of this severity before. He denies any issues with urination. He does endorse fatigue, changes in appetite and nausea and vomiting. While in the ED, the patient was given IV calcium, Dilaudid, and labetalol. A Cardene infusion was also started. Hospital Medicine was consulted for admission.      Overview/Hospital Course:  João Ham is a 42 year old male with a past medical history of HTN, obesity, and anemia who presents with multiple weeks of worsening swelling to the legs and scrotum associated with worsening shortness of breath secondary to hypertensive emergency with acute renal failure and demand ischemia. He was started on a Cardene infusion for BP control. He was also started on IV Lasix diuresis given volume overload. Nephrology and Cardiology have been consulted. A TTE is pending. The patient is also hypocalcemic (likely from renal failure); calcium is being repleted.      Interval History: see "Hospital Course"    Review of Systems   Constitutional:  Positive for activity change, appetite change and fatigue. Negative for fever.   HENT: Negative.     Eyes: Negative. "    Respiratory:  Positive for shortness of breath.    Cardiovascular:  Positive for leg swelling.   Gastrointestinal:  Positive for abdominal distention, constipation, diarrhea, nausea and vomiting. Negative for abdominal pain.   Endocrine: Negative.    Genitourinary:  Positive for scrotal swelling. Negative for difficulty urinating, flank pain and frequency.   Musculoskeletal: Negative.    Skin: Negative.    Allergic/Immunologic: Negative.    Neurological: Negative.    Hematological: Negative.    Psychiatric/Behavioral:  Positive for decreased concentration.    Objective:     Vital Signs (Most Recent):  Temp: 98.2 °F (36.8 °C) (10/11/22 0445)  Pulse: 90 (10/11/22 0747)  Resp: 18 (10/11/22 0148)  BP: (!) 162/98 (10/11/22 0747)  SpO2: 97 % (10/11/22 0747)   Vital Signs (24h Range):  Temp:  [97 °F (36.1 °C)-98.5 °F (36.9 °C)] 98.2 °F (36.8 °C)  Pulse:  [] 90  Resp:  [16-20] 18  SpO2:  [93 %-100 %] 97 %  BP: (117-214)/() 162/98     Weight: (!) 140.7 kg (310 lb 3 oz)  Body mass index is 39.83 kg/m².    Intake/Output Summary (Last 24 hours) at 10/11/2022 0902  Last data filed at 10/11/2022 0837  Gross per 24 hour   Intake 508.6 ml   Output 5300 ml   Net -4791.4 ml      Physical Exam  Vitals and nursing note reviewed.   Constitutional:       General: He is not in acute distress.     Appearance: He is obese. He is ill-appearing.      Comments: Anasarca.   HENT:      Head: Normocephalic and atraumatic.      Right Ear: External ear normal.      Left Ear: External ear normal.      Nose: Nose normal.      Mouth/Throat:      Mouth: Mucous membranes are moist.      Pharynx: Oropharynx is clear.   Eyes:      Extraocular Movements: Extraocular movements intact.      Conjunctiva/sclera: Conjunctivae normal.   Cardiovascular:      Rate and Rhythm: Normal rate and regular rhythm.      Pulses: Normal pulses.      Heart sounds: Normal heart sounds.   Pulmonary:      Effort: Pulmonary effort is normal. No respiratory  distress.      Breath sounds: Rales present.   Abdominal:      General: Bowel sounds are normal. There is no distension.      Palpations: Abdomen is soft.      Tenderness: There is no abdominal tenderness. There is no right CVA tenderness or left CVA tenderness.   Genitourinary:     Comments: Scrotal swelling.  Musculoskeletal:         General: Normal range of motion.      Cervical back: Normal range of motion and neck supple.      Right lower leg: Edema present.      Left lower leg: Edema present.   Skin:     General: Skin is warm and dry.   Neurological:      General: No focal deficit present.      Mental Status: He is alert and oriented to person, place, and time. Mental status is at baseline.   Psychiatric:         Mood and Affect: Mood normal.         Behavior: Behavior normal.       Significant Labs: All pertinent labs within the past 24 hours have been reviewed.    Significant Imaging: I have reviewed all pertinent imaging results/findings within the past 24 hours.      Assessment/Plan:      * Acute renal failure  Renal ultrasound shows no obstructive disease. Urine sodium 91.   -Nephrology consulted  -Monitor UOP and electrolytes  -IV Lasix diuresis  -Trend creatinine  -Renally dose medications  -Avoid nephrotoxic agents; hold home ARB and thiazide diuretic      Hypertensive emergency  -Cardene infusion  -Nephrology consulted for GORGE  -Cardiology consulted for elevated troponin  -TTE pending  -Telemetry    Elevated brain natriuretic peptide (BNP) level  -IV Lasix diuresis  -Cardiology consulted  -TTE pending      Elevated troponin  -Telemetry  -Trend troponin  -Cardiology consulted  -Follow up TTE    Obesity (BMI 30-39.9)  Body mass index is 39.83 kg/m². Morbid obesity complicates all aspects of disease management from diagnostic modalities to treatment.      Hypoalbuminemia  -Trend albumin with CMP      Elevated LFTs  Likely secondary to hepatic congestion.  -Trend LFTs with CMP      Acidosis,  metabolic  Secondary to renal failure.  -Trend with CMP      Hypocalcemia  PTH elevated.  -Trend ionized calcium  -Replete PRN  -Nephrology consulted      Anemia  Iron level low and ferritin at low limit of normal. Patient may benefit from iron repletion.  -Trend Hgb with CBC        VTE Risk Mitigation (From admission, onward)         Ordered     heparin (porcine) injection 5,000 Units  Every 8 hours         10/10/22 1955     IP VTE HIGH RISK PATIENT  Once         10/10/22 1955     Place sequential compression device  Until discontinued         10/10/22 1955                Discharge Planning   MARIA ISABEL: 10/17/2022    Code Status: Full Code   Is the patient medically ready for discharge?:     Reason for patient still in hospital (select all that apply): Patient trending condition, Laboratory test, Treatment, Imaging and Consult recommendations                     Jono Knowles MD  Department of Hospital Medicine   [facility]

## 2022-10-11 NOTE — SUBJECTIVE & OBJECTIVE
No past medical history on file.    No past surgical history on file.    Review of patient's allergies indicates:  No Known Allergies    No current facility-administered medications on file prior to encounter.     No current outpatient medications on file prior to encounter.     Family History    None       Tobacco Use    Smoking status: Not on file    Smokeless tobacco: Not on file   Substance and Sexual Activity    Alcohol use: Not on file    Drug use: Not on file    Sexual activity: Not on file     Review of Systems   Constitutional:  Positive for activity change, appetite change and fatigue. Negative for fever.   HENT: Negative.     Eyes: Negative.    Gastrointestinal:  Positive for abdominal distention, constipation, diarrhea, nausea and vomiting. Negative for abdominal pain.   Endocrine: Negative.    Genitourinary:  Positive for scrotal swelling. Negative for difficulty urinating, flank pain and frequency.   Musculoskeletal: Negative.    Skin: Negative.    Allergic/Immunologic: Negative.    Neurological: Negative.    Hematological: Negative.    Psychiatric/Behavioral:  Positive for decreased concentration.    Objective:     Vital Signs (Most Recent):  Temp: 98.5 °F (36.9 °C) (10/10/22 1340)  Pulse: 72 (10/10/22 1932)  Resp: 18 (10/10/22 1932)  BP: 132/89 (10/10/22 1932)  SpO2: 95 % (10/10/22 1932) Vital Signs (24h Range):  Temp:  [98.5 °F (36.9 °C)] 98.5 °F (36.9 °C)  Pulse:  [] 72  Resp:  [16-20] 18  SpO2:  [93 %-100 %] 95 %  BP: (117-214)/() 132/89     Weight: 117.9 kg (260 lb)  Body mass index is 33.38 kg/m².    Physical Exam  Vitals and nursing note reviewed.   Constitutional:       General: He is not in acute distress.     Appearance: He is obese. He is ill-appearing.   HENT:      Head: Normocephalic and atraumatic.      Right Ear: External ear normal.      Left Ear: External ear normal.      Nose: Nose normal.      Mouth/Throat:      Mouth: Mucous membranes are moist.      Pharynx: Oropharynx  is clear.   Eyes:      Extraocular Movements: Extraocular movements intact.      Conjunctiva/sclera: Conjunctivae normal.   Cardiovascular:      Rate and Rhythm: Normal rate and regular rhythm.      Pulses: Normal pulses.      Heart sounds: Normal heart sounds.   Pulmonary:      Effort: Pulmonary effort is normal. No respiratory distress.      Breath sounds: Rales present.   Abdominal:      General: Bowel sounds are normal. There is no distension.      Palpations: Abdomen is soft.      Tenderness: There is no abdominal tenderness. There is no right CVA tenderness or left CVA tenderness.   Musculoskeletal:         General: Normal range of motion.      Cervical back: Normal range of motion and neck supple.      Right lower leg: Edema present.      Left lower leg: Edema present.   Skin:     General: Skin is warm and dry.   Neurological:      General: No focal deficit present.      Mental Status: He is alert and oriented to person, place, and time. Mental status is at baseline.   Psychiatric:         Mood and Affect: Mood normal.         Behavior: Behavior normal.         Significant Labs: All pertinent labs within the past 24 hours have been reviewed.    Significant Imaging: I have reviewed all pertinent imaging results/findings within the past 24 hours.

## 2022-10-11 NOTE — ASSESSMENT & PLAN NOTE
Body mass index is 39.83 kg/m². Morbid obesity complicates all aspects of disease management from diagnostic modalities to treatment.

## 2022-10-11 NOTE — ASSESSMENT & PLAN NOTE
Iron level low and ferritin at low limit of normal. Patient may benefit from iron repletion.  -Trend Hgb with CBC

## 2022-10-12 PROBLEM — E87.6 HYPOKALEMIA: Status: ACTIVE | Noted: 2022-10-12

## 2022-10-12 PROBLEM — I50.31 ACUTE HEART FAILURE WITH PRESERVED EJECTION FRACTION: Status: ACTIVE | Noted: 2022-10-10

## 2022-10-12 LAB
ALBUMIN SERPL BCP-MCNC: 2.7 G/DL (ref 3.5–5.2)
ALP SERPL-CCNC: 95 U/L (ref 55–135)
ALT SERPL W/O P-5'-P-CCNC: 69 U/L (ref 10–44)
ANION GAP SERPL CALC-SCNC: 15 MMOL/L (ref 8–16)
AST SERPL-CCNC: 17 U/L (ref 10–40)
BASOPHILS # BLD AUTO: 0.02 K/UL (ref 0–0.2)
BASOPHILS NFR BLD: 0.2 % (ref 0–1.9)
BILIRUB SERPL-MCNC: 0.6 MG/DL (ref 0.1–1)
BUN SERPL-MCNC: 71 MG/DL (ref 6–20)
CALCIUM SERPL-MCNC: 6.7 MG/DL (ref 8.7–10.5)
CHLORIDE SERPL-SCNC: 101 MMOL/L (ref 95–110)
CO2 SERPL-SCNC: 23 MMOL/L (ref 23–29)
CREAT SERPL-MCNC: 6.8 MG/DL (ref 0.5–1.4)
DIFFERENTIAL METHOD: ABNORMAL
EOSINOPHIL # BLD AUTO: 0.2 K/UL (ref 0–0.5)
EOSINOPHIL NFR BLD: 2.4 % (ref 0–8)
ERYTHROCYTE [DISTWIDTH] IN BLOOD BY AUTOMATED COUNT: 15.9 % (ref 11.5–14.5)
EST. GFR  (NO RACE VARIABLE): 10 ML/MIN/1.73 M^2
GLUCOSE SERPL-MCNC: 103 MG/DL (ref 70–110)
HCT VFR BLD AUTO: 29.6 % (ref 40–54)
HGB BLD-MCNC: 9.8 G/DL (ref 14–18)
IMM GRANULOCYTES # BLD AUTO: 0.03 K/UL (ref 0–0.04)
IMM GRANULOCYTES NFR BLD AUTO: 0.4 % (ref 0–0.5)
LYMPHOCYTES # BLD AUTO: 0.7 K/UL (ref 1–4.8)
LYMPHOCYTES NFR BLD: 8.6 % (ref 18–48)
MAGNESIUM SERPL-MCNC: 1.7 MG/DL (ref 1.6–2.6)
MCH RBC QN AUTO: 28.9 PG (ref 27–31)
MCHC RBC AUTO-ENTMCNC: 33.1 G/DL (ref 32–36)
MCV RBC AUTO: 87 FL (ref 82–98)
MONOCYTES # BLD AUTO: 1.1 K/UL (ref 0.3–1)
MONOCYTES NFR BLD: 12.9 % (ref 4–15)
NEUTROPHILS # BLD AUTO: 6.4 K/UL (ref 1.8–7.7)
NEUTROPHILS NFR BLD: 75.5 % (ref 38–73)
NRBC BLD-RTO: 0 /100 WBC
PHOSPHATE SERPL-MCNC: 5.6 MG/DL (ref 2.7–4.5)
PLATELET # BLD AUTO: 352 K/UL (ref 150–450)
PMV BLD AUTO: 10.4 FL (ref 9.2–12.9)
POTASSIUM SERPL-SCNC: 3.3 MMOL/L (ref 3.5–5.1)
PROT SERPL-MCNC: 5.8 G/DL (ref 6–8.4)
PROT UR-MCNC: 74 MG/DL (ref 0–15)
RBC # BLD AUTO: 3.39 M/UL (ref 4.6–6.2)
SODIUM SERPL-SCNC: 139 MMOL/L (ref 136–145)
WBC # BLD AUTO: 8.5 K/UL (ref 3.9–12.7)

## 2022-10-12 PROCEDURE — 85025 COMPLETE CBC W/AUTO DIFF WBC: CPT | Performed by: STUDENT IN AN ORGANIZED HEALTH CARE EDUCATION/TRAINING PROGRAM

## 2022-10-12 PROCEDURE — 25000003 PHARM REV CODE 250: Performed by: INTERNAL MEDICINE

## 2022-10-12 PROCEDURE — 84165 PROTEIN E-PHORESIS SERUM: CPT | Performed by: INTERNAL MEDICINE

## 2022-10-12 PROCEDURE — 84156 ASSAY OF PROTEIN URINE: CPT | Performed by: INTERNAL MEDICINE

## 2022-10-12 PROCEDURE — 86334 IMMUNOFIX E-PHORESIS SERUM: CPT | Performed by: INTERNAL MEDICINE

## 2022-10-12 PROCEDURE — 99233 SBSQ HOSP IP/OBS HIGH 50: CPT | Mod: ,,, | Performed by: GENERAL PRACTICE

## 2022-10-12 PROCEDURE — 86335 IMMUNFIX E-PHORSIS/URINE/CSF: CPT | Performed by: INTERNAL MEDICINE

## 2022-10-12 PROCEDURE — 86335 IMMUNFIX E-PHORSIS/URINE/CSF: CPT | Mod: 26,,, | Performed by: PATHOLOGY

## 2022-10-12 PROCEDURE — 94761 N-INVAS EAR/PLS OXIMETRY MLT: CPT

## 2022-10-12 PROCEDURE — 25000003 PHARM REV CODE 250: Performed by: NURSE PRACTITIONER

## 2022-10-12 PROCEDURE — 63600175 PHARM REV CODE 636 W HCPCS: Performed by: NURSE PRACTITIONER

## 2022-10-12 PROCEDURE — 36415 COLL VENOUS BLD VENIPUNCTURE: CPT | Performed by: STUDENT IN AN ORGANIZED HEALTH CARE EDUCATION/TRAINING PROGRAM

## 2022-10-12 PROCEDURE — 84166 PROTEIN E-PHORESIS/URINE/CSF: CPT | Performed by: INTERNAL MEDICINE

## 2022-10-12 PROCEDURE — 84165 PROTEIN E-PHORESIS SERUM: CPT | Mod: 26,,, | Performed by: PATHOLOGY

## 2022-10-12 PROCEDURE — 84166 PATHOLOGIST INTERPRETATION UPE: ICD-10-PCS | Mod: 26,,, | Performed by: PATHOLOGY

## 2022-10-12 PROCEDURE — 84166 PROTEIN E-PHORESIS/URINE/CSF: CPT | Mod: 26,,, | Performed by: PATHOLOGY

## 2022-10-12 PROCEDURE — 83735 ASSAY OF MAGNESIUM: CPT | Performed by: STUDENT IN AN ORGANIZED HEALTH CARE EDUCATION/TRAINING PROGRAM

## 2022-10-12 PROCEDURE — 86335 PATHOLOGIST INTERPRETATION UIFE: ICD-10-PCS | Mod: 26,,, | Performed by: PATHOLOGY

## 2022-10-12 PROCEDURE — 27000221 HC OXYGEN, UP TO 24 HOURS

## 2022-10-12 PROCEDURE — 86334 IMMUNOFIX E-PHORESIS SERUM: CPT | Mod: 26,,, | Performed by: PATHOLOGY

## 2022-10-12 PROCEDURE — 80053 COMPREHEN METABOLIC PANEL: CPT | Performed by: STUDENT IN AN ORGANIZED HEALTH CARE EDUCATION/TRAINING PROGRAM

## 2022-10-12 PROCEDURE — 25000003 PHARM REV CODE 250: Performed by: STUDENT IN AN ORGANIZED HEALTH CARE EDUCATION/TRAINING PROGRAM

## 2022-10-12 PROCEDURE — 86334 PATHOLOGIST INTERPRETATION IFE: ICD-10-PCS | Mod: 26,,, | Performed by: PATHOLOGY

## 2022-10-12 PROCEDURE — 36415 COLL VENOUS BLD VENIPUNCTURE: CPT | Performed by: INTERNAL MEDICINE

## 2022-10-12 PROCEDURE — 11000001 HC ACUTE MED/SURG PRIVATE ROOM

## 2022-10-12 PROCEDURE — 99233 PR SUBSEQUENT HOSPITAL CARE,LEVL III: ICD-10-PCS | Mod: ,,, | Performed by: GENERAL PRACTICE

## 2022-10-12 PROCEDURE — 63600175 PHARM REV CODE 636 W HCPCS: Performed by: INTERNAL MEDICINE

## 2022-10-12 PROCEDURE — 63600175 PHARM REV CODE 636 W HCPCS: Performed by: STUDENT IN AN ORGANIZED HEALTH CARE EDUCATION/TRAINING PROGRAM

## 2022-10-12 PROCEDURE — 84165 PATHOLOGIST INTERPRETATION SPE: ICD-10-PCS | Mod: 26,,, | Performed by: PATHOLOGY

## 2022-10-12 PROCEDURE — 84100 ASSAY OF PHOSPHORUS: CPT | Performed by: STUDENT IN AN ORGANIZED HEALTH CARE EDUCATION/TRAINING PROGRAM

## 2022-10-12 RX ORDER — FUROSEMIDE 10 MG/ML
60 INJECTION INTRAMUSCULAR; INTRAVENOUS
Status: DISCONTINUED | OUTPATIENT
Start: 2022-10-12 | End: 2022-10-17

## 2022-10-12 RX ORDER — POTASSIUM CHLORIDE 20 MEQ/1
20 TABLET, EXTENDED RELEASE ORAL 2 TIMES DAILY
Status: DISCONTINUED | OUTPATIENT
Start: 2022-10-12 | End: 2022-10-18 | Stop reason: HOSPADM

## 2022-10-12 RX ORDER — FUROSEMIDE 10 MG/ML
60 INJECTION INTRAMUSCULAR; INTRAVENOUS
Status: DISCONTINUED | OUTPATIENT
Start: 2022-10-12 | End: 2022-10-12

## 2022-10-12 RX ORDER — TALC
6 POWDER (GRAM) TOPICAL NIGHTLY
Status: DISCONTINUED | OUTPATIENT
Start: 2022-10-12 | End: 2022-10-18 | Stop reason: HOSPADM

## 2022-10-12 RX ORDER — CALCIUM GLUCONATE 20 MG/ML
1 INJECTION, SOLUTION INTRAVENOUS ONCE
Status: COMPLETED | OUTPATIENT
Start: 2022-10-12 | End: 2022-10-12

## 2022-10-12 RX ORDER — FUROSEMIDE 10 MG/ML
40 INJECTION INTRAMUSCULAR; INTRAVENOUS
Status: DISCONTINUED | OUTPATIENT
Start: 2022-10-12 | End: 2022-10-12

## 2022-10-12 RX ORDER — ISOSORBIDE MONONITRATE 60 MG/1
120 TABLET, EXTENDED RELEASE ORAL DAILY
Status: DISCONTINUED | OUTPATIENT
Start: 2022-10-12 | End: 2022-10-18 | Stop reason: HOSPADM

## 2022-10-12 RX ORDER — CARVEDILOL 6.25 MG/1
12.5 TABLET ORAL 2 TIMES DAILY
Status: DISCONTINUED | OUTPATIENT
Start: 2022-10-12 | End: 2022-10-13

## 2022-10-12 RX ADMIN — SEVELAMER CARBONATE 800 MG: 800 TABLET, FILM COATED ORAL at 07:10

## 2022-10-12 RX ADMIN — FUROSEMIDE 40 MG: 10 INJECTION, SOLUTION INTRAMUSCULAR; INTRAVENOUS at 06:10

## 2022-10-12 RX ADMIN — HYDRALAZINE HYDROCHLORIDE 100 MG: 25 TABLET, FILM COATED ORAL at 02:10

## 2022-10-12 RX ADMIN — POTASSIUM CHLORIDE 20 MEQ: 1500 TABLET, EXTENDED RELEASE ORAL at 08:10

## 2022-10-12 RX ADMIN — HYDRALAZINE HYDROCHLORIDE 100 MG: 25 TABLET, FILM COATED ORAL at 11:10

## 2022-10-12 RX ADMIN — HEPARIN SODIUM 5000 UNITS: 5000 INJECTION INTRAVENOUS; SUBCUTANEOUS at 11:10

## 2022-10-12 RX ADMIN — ISOSORBIDE MONONITRATE 120 MG: 60 TABLET, EXTENDED RELEASE ORAL at 09:10

## 2022-10-12 RX ADMIN — FUROSEMIDE 60 MG: 10 INJECTION, SOLUTION INTRAMUSCULAR; INTRAVENOUS at 05:10

## 2022-10-12 RX ADMIN — ACETAMINOPHEN 1000 MG: 500 TABLET ORAL at 11:10

## 2022-10-12 RX ADMIN — CHOLECALCIFEROL TAB 125 MCG (5000 UNIT) 5000 UNITS: 125 TAB at 08:10

## 2022-10-12 RX ADMIN — MELATONIN TAB 3 MG 6 MG: 3 TAB at 08:10

## 2022-10-12 RX ADMIN — SEVELAMER CARBONATE 800 MG: 800 TABLET, FILM COATED ORAL at 12:10

## 2022-10-12 RX ADMIN — ACETAMINOPHEN 1000 MG: 500 TABLET ORAL at 04:10

## 2022-10-12 RX ADMIN — HYDRALAZINE HYDROCHLORIDE 10 MG: 20 INJECTION INTRAMUSCULAR; INTRAVENOUS at 12:10

## 2022-10-12 RX ADMIN — CALCIUM GLUCONATE 1 G: 20 INJECTION, SOLUTION INTRAVENOUS at 10:10

## 2022-10-12 RX ADMIN — HEPARIN SODIUM 5000 UNITS: 5000 INJECTION INTRAVENOUS; SUBCUTANEOUS at 05:10

## 2022-10-12 RX ADMIN — HYDRALAZINE HYDROCHLORIDE 100 MG: 25 TABLET, FILM COATED ORAL at 05:10

## 2022-10-12 RX ADMIN — SEVELAMER CARBONATE 800 MG: 800 TABLET, FILM COATED ORAL at 05:10

## 2022-10-12 RX ADMIN — MELATONIN TAB 3 MG 6 MG: 3 TAB at 12:10

## 2022-10-12 RX ADMIN — CALCIUM CARBONATE (ANTACID) CHEW TAB 500 MG 1000 MG: 500 CHEW TAB at 12:10

## 2022-10-12 RX ADMIN — CALCIUM CARBONATE (ANTACID) CHEW TAB 500 MG 1000 MG: 500 CHEW TAB at 07:10

## 2022-10-12 RX ADMIN — CALCIUM CARBONATE (ANTACID) CHEW TAB 500 MG 1000 MG: 500 CHEW TAB at 05:10

## 2022-10-12 RX ADMIN — HEPARIN SODIUM 5000 UNITS: 5000 INJECTION INTRAVENOUS; SUBCUTANEOUS at 02:10

## 2022-10-12 RX ADMIN — CARVEDILOL 12.5 MG: 6.25 TABLET, FILM COATED ORAL at 08:10

## 2022-10-12 NOTE — SUBJECTIVE & OBJECTIVE
"Interval History: see "Hospital Course"    Review of Systems   Constitutional:  Positive for activity change, appetite change and fatigue. Negative for fever.   HENT: Negative.     Eyes: Negative.    Respiratory:  Positive for shortness of breath.    Cardiovascular:  Positive for leg swelling.   Gastrointestinal:  Positive for abdominal distention. Negative for abdominal pain, constipation, diarrhea, nausea and vomiting.   Endocrine: Negative.    Genitourinary:  Positive for scrotal swelling. Negative for difficulty urinating, flank pain and frequency.   Musculoskeletal: Negative.    Skin: Negative.    Allergic/Immunologic: Negative.    Neurological: Negative.    Hematological: Negative.    Psychiatric/Behavioral:  Negative for decreased concentration.    Objective:     Vital Signs (Most Recent):  Temp: 98.2 °F (36.8 °C) (10/12/22 0700)  Pulse: 95 (10/12/22 0700)  Resp: 20 (10/11/22 2201)  BP: (!) 161/92 (10/12/22 0700)  SpO2: 98 % (10/12/22 0700)   Vital Signs (24h Range):  Temp:  [97 °F (36.1 °C)-98.2 °F (36.8 °C)] 98.2 °F (36.8 °C)  Pulse:  [] 95  Resp:  [20] 20  SpO2:  [92 %-100 %] 98 %  BP: (140-183)/() 161/92     Weight: (!) 140.6 kg (310 lb)  Body mass index is 39.8 kg/m².    Intake/Output Summary (Last 24 hours) at 10/12/2022 0850  Last data filed at 10/12/2022 0833  Gross per 24 hour   Intake 1240 ml   Output 6375 ml   Net -5135 ml      Physical Exam  Vitals and nursing note reviewed.   Constitutional:       General: He is not in acute distress.     Appearance: He is obese. He is ill-appearing.      Comments: Anasarca.   HENT:      Head: Normocephalic and atraumatic.      Right Ear: External ear normal.      Left Ear: External ear normal.      Nose: Nose normal.      Mouth/Throat:      Mouth: Mucous membranes are moist.      Pharynx: Oropharynx is clear.   Eyes:      Extraocular Movements: Extraocular movements intact.      Conjunctiva/sclera: Conjunctivae normal.   Cardiovascular:      Rate and " Rhythm: Normal rate and regular rhythm.      Pulses: Normal pulses.      Heart sounds: Normal heart sounds.   Pulmonary:      Effort: Pulmonary effort is normal. No respiratory distress.      Breath sounds: Rales present.   Abdominal:      General: Bowel sounds are normal. There is no distension.      Palpations: Abdomen is soft.      Tenderness: There is no abdominal tenderness. There is no right CVA tenderness or left CVA tenderness.   Genitourinary:     Comments: Scrotal swelling.  Musculoskeletal:         General: Normal range of motion.      Cervical back: Normal range of motion and neck supple.      Right lower leg: Edema present.      Left lower leg: Edema present.   Skin:     General: Skin is warm and dry.   Neurological:      General: No focal deficit present.      Mental Status: He is alert and oriented to person, place, and time. Mental status is at baseline.   Psychiatric:         Mood and Affect: Mood normal.         Behavior: Behavior normal.       Significant Labs: All pertinent labs within the past 24 hours have been reviewed.    Significant Imaging: I have reviewed all pertinent imaging results/findings within the past 24 hours.

## 2022-10-12 NOTE — ASSESSMENT & PLAN NOTE
TTE shows grade III diastolic dysfunction.  -IV Lasix diuresis  -Cardiology consulted  -Hydralazine and Imdur  -Coreg to be started for further BP control  -Telemetry  -Strict I's and O's  -Keep K > 4 and Mg > 2; caution with repletion given kidney failure

## 2022-10-12 NOTE — EICU
Rounding (Video Assessment):  Yes    Comments: Video assessment completed.  Pt lying in bed.  Eyes closed.  Resp even and unlabored.  Cardene infusion currently off - NIBP 161/92.  Pt without acute distress at present.

## 2022-10-12 NOTE — CARE UPDATE
10/12/22 0732   Patient Assessment/Suction   Level of Consciousness (AVPU) alert   PRE-TX-O2   O2 Device (Oxygen Therapy) nasal cannula   $ Is the patient on Low Flow Oxygen? Yes   Flow (L/min) 2   SpO2 100 %   Pulse Oximetry Type Intermittent   $ Pulse Oximetry - Multiple Charge Pulse Oximetry - Multiple     Attempted to wean O2. Pt refuses. States he needs O2

## 2022-10-12 NOTE — PROGRESS NOTES
[facility] Hospital Medicine  Progress Note    Patient Name: João Ham  MRN: [id number]  Patient Class: IP- Inpatient   Admission Date: 10/10/2022  Length of Stay: 2 days  Attending Physician: Jono Knowles MD  Primary Care Provider: No primary care provider on file.        Subjective:     Principal Problem:Acute renal failure        HPI:  João Ham is a 42 year old male with a past medical history of HTN, obesity, and anemia who presents with multiple weeks of worsening swelling to the legs and scrotum associated with worsening shortness of breath. The patient thought that the swelling possibly was secondary to his blood pressure medication, so he stopped taking these medications multiple days ago. He denies any chest pain or palpitations. He denies any drug, tobacco or alcohol use. He has never experienced swelling of this severity before. He denies any issues with urination. He does endorse fatigue, changes in appetite and nausea and vomiting. While in the ED, the patient was given IV calcium, Dilaudid, and labetalol. A Cardene infusion was also started. Hospital Medicine was consulted for admission.      Overview/Hospital Course:  João Ham is a 42 year old male with a past medical history of HTN, obesity, and anemia who presents with multiple weeks of worsening swelling to the legs and scrotum associated with worsening shortness of breath secondary to hypertensive emergency with acute renal failure and demand ischemia. He was started on a Cardene infusion for BP control. He was also started on IV Lasix diuresis given volume overload. Nephrology and Cardiology have been consulted. A TTE shows grade III diastolic dysfunction. The patient is also hypocalcemic (likely from renal failure); calcium is being repleted as well as vitamin D. Nephrology is considering kidney biopsy at this time. He has been weaned from Cardene and is now on hydralazine and Imdur.      Interval History: see  ""Hospital Course"    Review of Systems   Constitutional:  Positive for activity change, appetite change and fatigue. Negative for fever.   HENT: Negative.     Eyes: Negative.    Respiratory:  Positive for shortness of breath.    Cardiovascular:  Positive for leg swelling.   Gastrointestinal:  Positive for abdominal distention. Negative for abdominal pain, constipation, diarrhea, nausea and vomiting.   Endocrine: Negative.    Genitourinary:  Positive for scrotal swelling. Negative for difficulty urinating, flank pain and frequency.   Musculoskeletal: Negative.    Skin: Negative.    Allergic/Immunologic: Negative.    Neurological: Negative.    Hematological: Negative.    Psychiatric/Behavioral:  Negative for decreased concentration.    Objective:     Vital Signs (Most Recent):  Temp: 98.2 °F (36.8 °C) (10/12/22 0700)  Pulse: 95 (10/12/22 0700)  Resp: 20 (10/11/22 2201)  BP: (!) 161/92 (10/12/22 0700)  SpO2: 98 % (10/12/22 0700)   Vital Signs (24h Range):  Temp:  [97 °F (36.1 °C)-98.2 °F (36.8 °C)] 98.2 °F (36.8 °C)  Pulse:  [] 95  Resp:  [20] 20  SpO2:  [92 %-100 %] 98 %  BP: (140-183)/() 161/92     Weight: (!) 140.6 kg (310 lb)  Body mass index is 39.8 kg/m².    Intake/Output Summary (Last 24 hours) at 10/12/2022 0850  Last data filed at 10/12/2022 0833  Gross per 24 hour   Intake 1240 ml   Output 6375 ml   Net -5135 ml      Physical Exam  Vitals and nursing note reviewed.   Constitutional:       General: He is not in acute distress.     Appearance: He is obese. He is ill-appearing.      Comments: Anasarca.   HENT:      Head: Normocephalic and atraumatic.      Right Ear: External ear normal.      Left Ear: External ear normal.      Nose: Nose normal.      Mouth/Throat:      Mouth: Mucous membranes are moist.      Pharynx: Oropharynx is clear.   Eyes:      Extraocular Movements: Extraocular movements intact.      Conjunctiva/sclera: Conjunctivae normal.   Cardiovascular:      Rate and Rhythm: Normal rate " and regular rhythm.      Pulses: Normal pulses.      Heart sounds: Normal heart sounds.   Pulmonary:      Effort: Pulmonary effort is normal. No respiratory distress.      Breath sounds: Rales present.   Abdominal:      General: Bowel sounds are normal. There is no distension.      Palpations: Abdomen is soft.      Tenderness: There is no abdominal tenderness. There is no right CVA tenderness or left CVA tenderness.   Genitourinary:     Comments: Scrotal swelling.  Musculoskeletal:         General: Normal range of motion.      Cervical back: Normal range of motion and neck supple.      Right lower leg: Edema present.      Left lower leg: Edema present.   Skin:     General: Skin is warm and dry.   Neurological:      General: No focal deficit present.      Mental Status: He is alert and oriented to person, place, and time. Mental status is at baseline.   Psychiatric:         Mood and Affect: Mood normal.         Behavior: Behavior normal.       Significant Labs: All pertinent labs within the past 24 hours have been reviewed.    Significant Imaging: I have reviewed all pertinent imaging results/findings within the past 24 hours.      Assessment/Plan:      * Acute renal failure  Renal ultrasound shows no obstructive disease. Urine sodium 91.   -Nephrology consulted  -Monitor UOP and electrolytes  -IV Lasix diuresis  -Trend creatinine  -Renally dose medications  -Avoid nephrotoxic agents; hold home ARB and thiazide diuretic  -Nephrology considering possible kidney biopsy      Hypertensive emergency  -Hydralazine 100 Q8H  -Imdur 120  -Nephrology consulted for GORGE  -Cardiology consulted for elevated troponin  -Telemetry    Hypokalemia  -Scheduled repletion per Nephrology while patient diureses  -Trend K  -Telemetry      Acute heart failure with preserved ejection fraction  TTE shows grade III diastolic dysfunction.  -IV Lasix diuresis  -Cardiology consulted  -Hydralazine and Imdur  -Coreg to be started for further BP  control  -Telemetry  -Strict I's and O's  -Keep K > 4 and Mg > 2; caution with repletion given kidney failure    Elevated troponin  In setting of HFpEF exacerbation and kidney failure.  -Telemetry  -Cardiology consulted  -BP control  -Continue diuresis    Obesity (BMI 30-39.9)  Body mass index is 39.8 kg/m². Morbid obesity complicates all aspects of disease management from diagnostic modalities to treatment.      Hypoalbuminemia  -Trend albumin with CMP      Elevated LFTs  Likely secondary to hepatic congestion.  -Trend LFTs with CMP      Acidosis, metabolic  Secondary to renal failure.  -Trend with CMP      Hypocalcemia  PTH elevated. Low vitamin D.  -Trend ionized calcium  -Scheduled calcium and vitamin D repletion  -Replete PRN  -Nephrology consulted      Anemia  Iron level low and ferritin at low limit of normal. Patient may benefit from iron repletion.  -Trend Hgb with CBC  -Iron repletion per Nephrology        VTE Risk Mitigation (From admission, onward)         Ordered     heparin (porcine) injection 5,000 Units  Every 8 hours         10/10/22 1955     IP VTE HIGH RISK PATIENT  Once         10/10/22 1955     Place sequential compression device  Until discontinued         10/10/22 1955                Discharge Planning   MARIA ISABEL: 10/15/2022     Code Status: Full Code   Is the patient medically ready for discharge?:     Reason for patient still in hospital (select all that apply): Patient trending condition, Laboratory test, Treatment and Consult recommendations  Discharge Plan A: Home with family                  Jono Knowles MD  Department of Hospital Medicine   [facility]

## 2022-10-12 NOTE — ASSESSMENT & PLAN NOTE
Body mass index is 39.8 kg/m². Morbid obesity complicates all aspects of disease management from diagnostic modalities to treatment.

## 2022-10-12 NOTE — PLAN OF CARE
Problem: Adult Inpatient Plan of Care  Goal: Optimal Comfort and Wellbeing  Outcome: Ongoing, Progressing     Problem: Fluid and Electrolyte Imbalance (Acute Kidney Injury/Impairment)  Goal: Fluid and Electrolyte Balance  Outcome: Ongoing, Progressing     Problem: Renal Function Impairment (Acute Kidney Injury/Impairment)  Goal: Effective Renal Function  Outcome: Ongoing, Progressing   Pt remains in bed, No signs of distress noted. BP monitored. Cardene @2 mg/hr, 3L o2 applied to pt d/t freq episodes of apnea. Bed alarm is set, call light in reach.

## 2022-10-12 NOTE — ASSESSMENT & PLAN NOTE
Iron level low and ferritin at low limit of normal. Patient may benefit from iron repletion.  -Trend Hgb with CBC  -Iron repletion per Nephrology

## 2022-10-12 NOTE — PROGRESS NOTES
[facility]  Department of Cardiology  Progress Note    PATIENT NAME: João Ham  MRN: [id number]  TODAY'S DATE: 10/12/2022  ADMIT DATE: 10/10/2022    SUBJECTIVE     PRINCIPLE PROBLEM: Acute renal failure    INTERVAL HISTORY:       42M with little previous follow-up but a PMHx of treated HTN presents to the ED with progressive leg swelling, scrotal swelling, abdominal distention, and SOB over approximately 2 weeks with 1 episode of racing heart. Patient reports he has a good walk from his vehicle to his office at work and notes he had to stop to catch his breath. He denies prior similar episodes. He stopped his Amlodipine, Valsartin and HCTZ a week ago because he thought it was contributing to his swelling. Per my discussion with Dr. Fox, agree with cards eval and diuretic due to uncontrolled HTN,as well as renal US and UA. I added Phos, Mg, PTH, am vitamin D levels in AM to evaluate further.     10/11 VSS. Renal US is unremarkable, UA is bland. Vitamin D is low and PTH is high, add oral D3 and oral calcium supplement. Agree with cards recommendations and med changes.     10/12 VSS. Will quantify proteinuria. Etiology of such advanced CKD is unclear. Not sure that there is anything treatable, but a renal biopsy maybe important from prognostic stand point in this young man with possible dialysis and transplantation in the future. Will discuss with patient, hold blood thinners. Relax K restriction, give oral KCL. Continue diuretics.       10/12/2022    Patient's blood pressure is doing well on hydralazine and nitrates.  He is off of Nipride drip  Less shortness of breath  Review of patient's allergies indicates:  No Known Allergies    REVIEW OF SYSTEMS  CARDIOVASCULAR: No recent chest pain, palpitations, arm, neck, or jaw pain  RESPIRATORY: No recent fever, cough chills, SOB or congestion  : No blood in the urine  GI: No Nausea, vomiting, constipation, diarrhea, blood, or reflux.  MUSCULOSKELETAL:  No myalgias  NEURO: No lightheadedness or dizziness  EYES: No Double vision, blurry, vision or headache     OBJECTIVE     VITAL SIGNS (Most Recent)  Temp: 98.2 °F (36.8 °C) (10/12/22 0700)  Pulse: 94 (10/12/22 0900)  Resp: 20 (10/11/22 2201)  BP: (!) 163/92 (10/12/22 0900)  SpO2: 98 % (10/12/22 0900)    VENTILATION STATUS  Resp: 20 (10/11/22 2201)  SpO2: 98 % (10/12/22 0900)       I & O (Last 24H):  Intake/Output Summary (Last 24 hours) at 10/12/2022 0959  Last data filed at 10/12/2022 0833  Gross per 24 hour   Intake 1090 ml   Output 5975 ml   Net -4885 ml       WEIGHTS  Wt Readings from Last 3 Encounters:   10/11/22 0902 (!) 140.6 kg (310 lb)   10/10/22 2015 (!) 140.7 kg (310 lb 3 oz)   10/10/22 1340 117.9 kg (260 lb)       PHYSICAL EXAM  CONSTITUTIONAL: Well built, well nourished in no apparent distress  NECK: no carotid bruit, no JVD  LUNGS: CTA  CHEST WALL: no tenderness  HEART: regular rate and rhythm, S1, S2 normal, no murmur, click, rub or gallop   ABDOMEN: soft, non-tender; bowel sounds normal; no masses,  no organomegaly  EXTREMITIES: Extremities normal, no edema  NEURO: AAO X 3    SCHEDULED MEDS:   calcium carbonate  1,000 mg Oral TID WM    calcium gluconate IVPB  1 g Intravenous Once    cholecalciferol (vitamin D3)  5,000 Units Oral Daily    furosemide (LASIX) injection  40 mg Intravenous Q12H    heparin (porcine)  5,000 Units Subcutaneous Q8H    hydrALAZINE  100 mg Oral Q8H    isosorbide mononitrate  120 mg Oral Daily    LIDOcaine  1 patch Transdermal Q24H    melatonin  6 mg Oral Nightly    potassium chloride  20 mEq Oral BID    sevelamer carbonate  800 mg Oral TID WM       CONTINUOUS INFUSIONS:    PRN MEDS:acetaminophen, hydrALAZINE, labetaloL, naloxone, sodium chloride 0.9%    LABS AND DIAGNOSTICS     CBC LAST 3 DAYS  Recent Labs   Lab 10/10/22  1452 10/11/22  0512 10/12/22  0417   WBC 8.10 7.41 8.50   RBC 3.64* 3.73* 3.39*   HGB 10.7* 10.5* 9.8*   HCT 31.6* 32.6* 29.6*   MCV 87 87 87   MCH 29.4 28.2  28.9   MCHC 33.9 32.2 33.1   RDW 15.9* 15.8* 15.9*    360 352   MPV 10.3 9.9 10.4   GRAN 74.2*  6.0 73.3*  5.4 75.5*  6.4   LYMPH 12.1*  1.0 10.7*  0.8* 8.6*  0.7*   MONO 12.0  1.0 13.4  1.0 12.9  1.1*   BASO 0.03 0.02 0.02   NRBC 0 0 0       COAGULATION LAST 3 DAYS  Recent Labs   Lab 10/10/22  2005   INR 1.1   APTT 24.5       CHEMISTRY LAST 3 DAYS  Recent Labs   Lab 10/10/22  1452 10/11/22  0512 10/12/22  0417    135* 139   K 3.5 3.0* 3.3*    99 101   CO2 17* 20* 23   ANIONGAP 17* 16 15   BUN 80* 77* 71*   CREATININE 7.9* 7.5* 6.8*    111* 103   CALCIUM 6.7* 6.8* 6.7*   MG 2.0 1.9 1.7   ALBUMIN 2.7* 2.8* 2.7*   PROT 5.8* 5.9* 5.8*   ALKPHOS 119 107 95   ALT 94* 86* 69*   AST 39 22 17   BILITOT 0.6 0.7 0.6       CARDIAC PROFILE LAST 3 DAYS  Recent Labs   Lab 10/10/22  1452 10/10/22  1738 10/10/22  2005 10/11/22  0044   BNP 3,678*  --   --   --    TROPONINI 0.476* 0.481* 0.472* 0.427*       ENDOCRINE LAST 3 DAYS  Recent Labs   Lab 10/10/22  2005   TSH 1.699       LAST ARTERIAL BLOOD GAS  ABG  No results for input(s): PH, PO2, PCO2, HCO3, BE in the last 168 hours.    LAST 7 DAYS MICROBIOLOGY   Microbiology Results (last 7 days)       ** No results found for the last 168 hours. **            MOST RECENT IMAGING  Echo  · The estimated PA systolic pressure is 48 mmHg.  · The left ventricle is normal in size with mild concentric hypertrophy   and normal systolic function.  · Grade III left ventricular diastolic dysfunction.  · Moderate right ventricular enlargement with mildly to moderately reduced   right ventricular systolic function.  · Severe left atrial enlargement.  · There is moderate pulmonary hypertension.  · Intermediate central venous pressure (8 mmHg).  · Mild-to-moderate mitral regurgitation.  · Moderate to severe tricuspid regurgitation.  · Moderate right atrial enlargement.  · The estimated ejection fraction is 60%.  · Atrial fibrillation not observed.          [facility]  Results for orders placed during the hospital encounter of 10/10/22    Echo    Interpretation Summary  · The estimated PA systolic pressure is 48 mmHg.  · The left ventricle is normal in size with mild concentric hypertrophy and normal systolic function.  · Grade III left ventricular diastolic dysfunction.  · Moderate right ventricular enlargement with mildly to moderately reduced right ventricular systolic function.  · Severe left atrial enlargement.  · There is moderate pulmonary hypertension.  · Intermediate central venous pressure (8 mmHg).  · Mild-to-moderate mitral regurgitation.  · Moderate to severe tricuspid regurgitation.  · Moderate right atrial enlargement.  · The estimated ejection fraction is 60%.  · Atrial fibrillation not observed.      CURRENT/PREVIOUS VISIT EKG  Results for orders placed or performed during the hospital encounter of 10/10/22   EKG 12-lead    Collection Time: 10/10/22  1:52 PM    Narrative    Test Reason : R06.02,    Vent. Rate : 102 BPM     Atrial Rate : 102 BPM     P-R Int : 132 ms          QRS Dur : 098 ms      QT Int : 382 ms       P-R-T Axes : 079 -65 085 degrees     QTc Int : 497 ms    Sinus tachycardia  Right atrial enlargement  Left anterior fascicular block  Cannot rule out Anterior infarct ,age undetermined  Abnormal ECG  When compared with ECG of 10-OCT-2022 13:49,  Previous ECG has undetermined rhythm, needs review    Referred By: [email or web address]   SELF           Confirmed By:        ASSESSMENT/PLAN:     Active Hospital Problems    Diagnosis    *Acute renal failure    Hypokalemia    Hypertensive emergency    Anemia    Hypocalcemia    Acidosis, metabolic    Elevated LFTs    Hypoalbuminemia    Obesity (BMI 30-39.9)    Elevated troponin    Acute heart failure with preserved ejection fraction       ASSESSMENT & PLAN:   Malignant hypertension improved  Heart failure secondary to above  Acute renal failure    RECOMMENDATIONS:  Add amlodipine if he needs further blood  pressure control  Renal function per Nephrology        Dawson Garsia MD  [facility]  Department of Cardiology  Date of Service: 10/12/2022  9:59 AM

## 2022-10-12 NOTE — PROGRESS NOTES
Nephrology Consult Note        Patient Name: João Ham  MRN: [id number]    Patient Class: IP- Inpatient   Admission Date: 10/10/2022  Length of Stay: 2 days  Date of Service: 10/12/2022    Attending Physician: Jono Knowles MD  Primary Care Provider: No primary care provider on file.    Reason for Consult: clarke/ckd/uremia/acidosis/chf/htn/anemia    SUBJECTIVE:     HPI: 42M with little previous follow-up but a PMHx of treated HTN presents to the ED with progressive leg swelling, scrotal swelling, abdominal distention, and SOB over approximately 2 weeks with 1 episode of racing heart. Patient reports he has a good walk from his vehicle to his office at work and notes he had to stop to catch his breath. He denies prior similar episodes. He stopped his Amlodipine, Valsartin and HCTZ a week ago because he thought it was contributing to his swelling. Per my discussion with Dr. Fox, agree with cards eval and diuretic due to uncontrolled HTN,as well as renal US and UA. I added Phos, Mg, PTH, am vitamin D levels in AM to evaluate further.    10/11 VSS. Renal US is unremarkable, UA is bland. Vitamin D is low and PTH is high, add oral D3 and oral calcium supplement. Agree with cards recommendations and med changes.    10/12 VSS. Will quantify proteinuria. Etiology of such advanced CKD is unclear. Not sure that there is anything treatable, but a renal biopsy maybe important from prognostic stand point in this young man with possible dialysis and transplantation in the future. Will discuss with patient, hold blood thinners. Relax K restriction, give oral KCL. Continue diuretics.    Past Medical History:   Diagnosis Date    Hypertension      No past surgical history on file.  No family history on file.       Review of patient's allergies indicates:  No Known Allergies    Outpatient meds:  No current facility-administered medications on file prior to encounter.     Current Outpatient Medications on File Prior to Encounter    Medication Sig Dispense Refill    amlodipine-valsartan (EXFORGE)  mg per tablet Take 1 tablet by mouth once daily.      hydroCHLOROthiazide (HYDRODIURIL) 25 MG tablet Take 25 mg by mouth once daily.         Scheduled meds:   calcium carbonate  1,000 mg Oral TID WM    cholecalciferol (vitamin D3)  5,000 Units Oral Daily    furosemide (LASIX) injection  40 mg Intravenous Q12H    heparin (porcine)  5,000 Units Subcutaneous Q8H    hydrALAZINE  100 mg Oral Q8H    isosorbide mononitrate  60 mg Oral Daily    LIDOcaine  1 patch Transdermal Q24H    melatonin  6 mg Oral Nightly    sevelamer carbonate  800 mg Oral TID WM       Infusions:   niCARdipine Stopped (10/12/22 0356)       PRN meds:      Review of Systems:    OBJECTIVE:     Vital Signs and IO:  Temp:  [97 °F (36.1 °C)-98.2 °F (36.8 °C)]   Pulse:  []   Resp:  [20]   BP: (140-183)/()   SpO2:  [92 %-100 %]   I/O last 3 completed shifts:  In: 1748.6 [P.O.:1660; I.V.:38.6; IV Piggyback:50]  Out: 19639 [Urine:52281]  Wt Readings from Last 5 Encounters:   10/11/22 (!) 140.6 kg (310 lb)     Body mass index is 39.8 kg/m².    Physical Exam  Constitutional:       General: He is not in acute distress.     Appearance: He is well-developed. He is not diaphoretic.   HENT:      Head: Normocephalic and atraumatic.      Mouth/Throat:      Mouth: Mucous membranes are moist.   Eyes:      General: No scleral icterus.     Pupils: Pupils are equal, round, and reactive to light.   Cardiovascular:      Rate and Rhythm: Normal rate and regular rhythm.   Pulmonary:      Effort: Pulmonary effort is normal. No respiratory distress.      Breath sounds: No stridor.   Abdominal:      General: There is distension.      Palpations: Abdomen is soft.   Musculoskeletal:         General: Swelling present. No deformity. Normal range of motion.      Cervical back: Neck supple.   Skin:     General: Skin is warm and dry.      Findings: No erythema or rash.   Neurological:      Mental  Status: He is alert and oriented to person, place, and time.      Cranial Nerves: No cranial nerve deficit.   Psychiatric:         Behavior: Behavior normal.     Laboratory:  Recent Labs   Lab 10/10/22  1452 10/11/22  0512 10/12/22  0417    135* 139   K 3.5 3.0* 3.3*    99 101   CO2 17* 20* 23   BUN 80* 77* 71*   CREATININE 7.9* 7.5* 6.8*    111* 103       Recent Labs   Lab 10/10/22  1452 10/11/22  0512 10/12/22  0417   CALCIUM 6.7* 6.8* 6.7*   ALBUMIN 2.7* 2.8* 2.7*   PHOS 6.1* 6.0* 5.6*   MG 2.0 1.9 1.7       Recent Labs   Lab 10/10/22  1452 10/10/22  2005   PTH, Intact 632.4 H 517.7 H       No results for input(s): POCTGLUCOSE in the last 168 hours.    Recent Labs   Lab 10/10/22  2005   Hemoglobin A1C 5.5       Recent Labs   Lab 10/10/22  1452 10/11/22  0512 10/12/22  0417   WBC 8.10 7.41 8.50   HGB 10.7* 10.5* 9.8*   HCT 31.6* 32.6* 29.6*    360 352   MCV 87 87 87   MCHC 33.9 32.2 33.1   MONO 12.0  1.0 13.4  1.0 12.9  1.1*       Recent Labs   Lab 10/10/22  1452 10/11/22  0512 10/12/22  0417   BILITOT 0.6 0.7 0.6   PROT 5.8* 5.9* 5.8*   ALBUMIN 2.7* 2.8* 2.7*   ALKPHOS 119 107 95   ALT 94* 86* 69*   AST 39 22 17       Recent Labs   Lab 10/10/22  1645   Color, UA Yellow   Appearance, UA Clear   pH, UA 6.0   Specific Gravity, UA 1.025   Protein, UA 2+ A   Glucose, UA Negative   Ketones, UA Negative   Urobilinogen, UA Negative   Bilirubin (UA) Negative   Occult Blood UA 1+ A   Nitrite, UA Negative   RBC, UA 1   WBC, UA 0   Bacteria None   Hyaline Casts, UA 0             Microbiology Results (last 7 days)       ** No results found for the last 168 hours. 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 HTN nephrosclerosis  Acidosis  Hypokalemia  Uremia  Fluid overload  CHF  No NSAIDs, ACEI/ARB, IV contrast or other nephrotoxins.  Keep MAP > 60, SBP > 100.  Dose meds for GFR < 30 ml/min.  Relax diet, add oral KCL.  UA is bland with proteinuria. Renal  US is unremarkable.  Agree with cards eval.  No emergency dialysis needs.  Will discuss diagnostic and prognostic renal biopsy with patient, quantify proteinuria, obtain hepatitis panel.    Anemia of CKD  Hgb and HCT are acceptable. Monitor for now.  Will provide LOR and/or IV iron PRN.    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 CKD-MBD vs reactive) and vitamin D levels are low.   Started oral D3 and Ca supplement.  Added phos binders.    HTN  Fluid overload  BP seem high and uncontrolled.   Tolerate asymptomatic HTN up to -160.  Low sodium diet.  Agree with lasix IV, niseritide gtt and Cards recommendations.    Thank you for allowing us to participate in the care of your patient!   We will follow the patient and provide recommendations as needed.    Patient care time was spent personally by me on the following activities:     Obtaining a history.  Examination of patient.  Providing medical care at the patients bedside.  Developing a treatment plan with patient or surrogate and bedside caregivers.  Ordering and reviewing laboratory studies, radiographic studies, pulse oximetry.  Ordering and performing treatments and interventions.  Evaluation of patient's response to treatment.  Discussions with consultants while on the unit and immediately available to the patient.  Re-evaluation of the patient's condition.  Documentation in the medical record.     Jeremy Madrid MD    [facility]  [address] [place] [address]  [place] [address]    [phone] - tel  [phone] - fax    10/12/2022

## 2022-10-12 NOTE — ASSESSMENT & PLAN NOTE
PTH elevated. Low vitamin D.  -Trend ionized calcium  -Scheduled calcium and vitamin D repletion  -Replete PRN  -Nephrology consulted

## 2022-10-12 NOTE — EICU
Rounding (Video Assessment):  Yes    Intervention Initiated From:  [facility] / EICU    Yuni Communicated with Bedside Nurse regarding:  Documentation    Nurse Notified:  Yes    Doctor Notified:  No    Comments: Vidyo rounding complete; patient asleep w/ frequent episodes of apnea and coinciding drop in O2 sats to 85% - discussed with RN and possible supplemental O2 as an option.  Cardene infusing, /86

## 2022-10-12 NOTE — ASSESSMENT & PLAN NOTE
-Hydralazine 100 Q8H  -Imdur 120  -Nephrology consulted for GORGE  -Cardiology consulted for elevated troponin  -Telemetry

## 2022-10-12 NOTE — ASSESSMENT & PLAN NOTE
Renal ultrasound shows no obstructive disease. Urine sodium 91.   -Nephrology consulted  -Monitor UOP and electrolytes  -IV Lasix diuresis  -Trend creatinine  -Renally dose medications  -Avoid nephrotoxic agents; hold home ARB and thiazide diuretic  -Nephrology considering possible kidney biopsy

## 2022-10-12 NOTE — ASSESSMENT & PLAN NOTE
In setting of HFpEF exacerbation and kidney failure.  -Telemetry  -Cardiology consulted  -BP control  -Continue diuresis

## 2022-10-13 PROBLEM — E87.6 HYPOKALEMIA: Status: RESOLVED | Noted: 2022-10-12 | Resolved: 2022-10-13

## 2022-10-13 PROBLEM — E87.20 ACIDOSIS, METABOLIC: Status: RESOLVED | Noted: 2022-10-10 | Resolved: 2022-10-13

## 2022-10-13 LAB
ALBUMIN SERPL BCP-MCNC: 2.6 G/DL (ref 3.5–5.2)
ALBUMIN SERPL ELPH-MCNC: 2.8 G/DL (ref 3.35–5.55)
ALP SERPL-CCNC: 80 U/L (ref 55–135)
ALPHA1 GLOB SERPL ELPH-MCNC: 0.4 G/DL (ref 0.17–0.41)
ALPHA2 GLOB SERPL ELPH-MCNC: 0.52 G/DL (ref 0.43–0.99)
ALT SERPL W/O P-5'-P-CCNC: 48 U/L (ref 10–44)
ANION GAP SERPL CALC-SCNC: 13 MMOL/L (ref 8–16)
AST SERPL-CCNC: 9 U/L (ref 10–40)
B-GLOBULIN SERPL ELPH-MCNC: 1.02 G/DL (ref 0.5–1.1)
BASOPHILS # BLD AUTO: 0.03 K/UL (ref 0–0.2)
BASOPHILS NFR BLD: 0.4 % (ref 0–1.9)
BILIRUB SERPL-MCNC: 0.5 MG/DL (ref 0.1–1)
BUN SERPL-MCNC: 66 MG/DL (ref 6–20)
CA-I BLDV-SCNC: 0.81 MMOL/L (ref 1.06–1.42)
CALCIUM SERPL-MCNC: 7.1 MG/DL (ref 8.7–10.5)
CHLORIDE SERPL-SCNC: 102 MMOL/L (ref 95–110)
CO2 SERPL-SCNC: 24 MMOL/L (ref 23–29)
CREAT SERPL-MCNC: 6.5 MG/DL (ref 0.5–1.4)
DIFFERENTIAL METHOD: ABNORMAL
EOSINOPHIL # BLD AUTO: 0.2 K/UL (ref 0–0.5)
EOSINOPHIL NFR BLD: 2.5 % (ref 0–8)
ERYTHROCYTE [DISTWIDTH] IN BLOOD BY AUTOMATED COUNT: 16.2 % (ref 11.5–14.5)
EST. GFR  (NO RACE VARIABLE): 10 ML/MIN/1.73 M^2
GAMMA GLOB SERPL ELPH-MCNC: 0.47 G/DL (ref 0.67–1.58)
GLUCOSE SERPL-MCNC: 101 MG/DL (ref 70–110)
HCT VFR BLD AUTO: 29.1 % (ref 40–54)
HGB BLD-MCNC: 9.3 G/DL (ref 14–18)
IMM GRANULOCYTES # BLD AUTO: 0.01 K/UL (ref 0–0.04)
IMM GRANULOCYTES NFR BLD AUTO: 0.1 % (ref 0–0.5)
INTERPRETATION UR IFE-IMP: NORMAL
LYMPHOCYTES # BLD AUTO: 0.8 K/UL (ref 1–4.8)
LYMPHOCYTES NFR BLD: 9.3 % (ref 18–48)
MAGNESIUM SERPL-MCNC: 1.8 MG/DL (ref 1.6–2.6)
MCH RBC QN AUTO: 28.2 PG (ref 27–31)
MCHC RBC AUTO-ENTMCNC: 32 G/DL (ref 32–36)
MCV RBC AUTO: 88 FL (ref 82–98)
MONOCYTES # BLD AUTO: 1.3 K/UL (ref 0.3–1)
MONOCYTES NFR BLD: 15.1 % (ref 4–15)
NEUTROPHILS # BLD AUTO: 6.1 K/UL (ref 1.8–7.7)
NEUTROPHILS NFR BLD: 72.6 % (ref 38–73)
NRBC BLD-RTO: 0 /100 WBC
PATHOLOGIST INTERPRETATION UIFE: NORMAL
PHOSPHATE SERPL-MCNC: 5.4 MG/DL (ref 2.7–4.5)
PLATELET # BLD AUTO: 313 K/UL (ref 150–450)
PMV BLD AUTO: 9.9 FL (ref 9.2–12.9)
POTASSIUM SERPL-SCNC: 3.9 MMOL/L (ref 3.5–5.1)
PROT SERPL-MCNC: 5.2 G/DL (ref 6–8.4)
PROT SERPL-MCNC: 5.8 G/DL (ref 6–8.4)
RBC # BLD AUTO: 3.3 M/UL (ref 4.6–6.2)
SODIUM SERPL-SCNC: 139 MMOL/L (ref 136–145)
TROPONIN I SERPL DL<=0.01 NG/ML-MCNC: 0.29 NG/ML (ref 0–0.03)
WBC # BLD AUTO: 8.41 K/UL (ref 3.9–12.7)

## 2022-10-13 PROCEDURE — 84100 ASSAY OF PHOSPHORUS: CPT | Performed by: STUDENT IN AN ORGANIZED HEALTH CARE EDUCATION/TRAINING PROGRAM

## 2022-10-13 PROCEDURE — 63600175 PHARM REV CODE 636 W HCPCS: Performed by: INTERNAL MEDICINE

## 2022-10-13 PROCEDURE — 12000002 HC ACUTE/MED SURGE SEMI-PRIVATE ROOM

## 2022-10-13 PROCEDURE — 83735 ASSAY OF MAGNESIUM: CPT | Performed by: STUDENT IN AN ORGANIZED HEALTH CARE EDUCATION/TRAINING PROGRAM

## 2022-10-13 PROCEDURE — 93010 EKG 12-LEAD: ICD-10-PCS | Mod: ,,, | Performed by: SPECIALIST

## 2022-10-13 PROCEDURE — 25000003 PHARM REV CODE 250: Performed by: NURSE PRACTITIONER

## 2022-10-13 PROCEDURE — 99233 SBSQ HOSP IP/OBS HIGH 50: CPT | Mod: ,,, | Performed by: GENERAL PRACTICE

## 2022-10-13 PROCEDURE — 36415 COLL VENOUS BLD VENIPUNCTURE: CPT | Performed by: STUDENT IN AN ORGANIZED HEALTH CARE EDUCATION/TRAINING PROGRAM

## 2022-10-13 PROCEDURE — 80053 COMPREHEN METABOLIC PANEL: CPT | Performed by: STUDENT IN AN ORGANIZED HEALTH CARE EDUCATION/TRAINING PROGRAM

## 2022-10-13 PROCEDURE — 27000221 HC OXYGEN, UP TO 24 HOURS

## 2022-10-13 PROCEDURE — 99233 PR SUBSEQUENT HOSPITAL CARE,LEVL III: ICD-10-PCS | Mod: ,,, | Performed by: GENERAL PRACTICE

## 2022-10-13 PROCEDURE — 84484 ASSAY OF TROPONIN QUANT: CPT | Performed by: STUDENT IN AN ORGANIZED HEALTH CARE EDUCATION/TRAINING PROGRAM

## 2022-10-13 PROCEDURE — 82330 ASSAY OF CALCIUM: CPT | Performed by: STUDENT IN AN ORGANIZED HEALTH CARE EDUCATION/TRAINING PROGRAM

## 2022-10-13 PROCEDURE — 25000003 PHARM REV CODE 250: Performed by: STUDENT IN AN ORGANIZED HEALTH CARE EDUCATION/TRAINING PROGRAM

## 2022-10-13 PROCEDURE — 93010 ELECTROCARDIOGRAM REPORT: CPT | Mod: ,,, | Performed by: SPECIALIST

## 2022-10-13 PROCEDURE — 93005 ELECTROCARDIOGRAM TRACING: CPT

## 2022-10-13 PROCEDURE — 63600175 PHARM REV CODE 636 W HCPCS: Performed by: STUDENT IN AN ORGANIZED HEALTH CARE EDUCATION/TRAINING PROGRAM

## 2022-10-13 PROCEDURE — 85025 COMPLETE CBC W/AUTO DIFF WBC: CPT | Performed by: STUDENT IN AN ORGANIZED HEALTH CARE EDUCATION/TRAINING PROGRAM

## 2022-10-13 PROCEDURE — 94761 N-INVAS EAR/PLS OXIMETRY MLT: CPT

## 2022-10-13 PROCEDURE — 25000003 PHARM REV CODE 250: Performed by: INTERNAL MEDICINE

## 2022-10-13 RX ORDER — AMLODIPINE BESYLATE 5 MG/1
10 TABLET ORAL DAILY
Status: DISCONTINUED | OUTPATIENT
Start: 2022-10-13 | End: 2022-10-18 | Stop reason: HOSPADM

## 2022-10-13 RX ORDER — METOLAZONE 2.5 MG/1
5 TABLET ORAL DAILY
Status: DISCONTINUED | OUTPATIENT
Start: 2022-10-13 | End: 2022-10-18

## 2022-10-13 RX ORDER — CARVEDILOL 25 MG/1
25 TABLET ORAL 2 TIMES DAILY
Status: DISCONTINUED | OUTPATIENT
Start: 2022-10-13 | End: 2022-10-17

## 2022-10-13 RX ADMIN — HEPARIN SODIUM 5000 UNITS: 5000 INJECTION INTRAVENOUS; SUBCUTANEOUS at 02:10

## 2022-10-13 RX ADMIN — MELATONIN TAB 3 MG 6 MG: 3 TAB at 08:10

## 2022-10-13 RX ADMIN — SEVELAMER CARBONATE 800 MG: 800 TABLET, FILM COATED ORAL at 04:10

## 2022-10-13 RX ADMIN — HYDRALAZINE HYDROCHLORIDE 100 MG: 25 TABLET, FILM COATED ORAL at 05:10

## 2022-10-13 RX ADMIN — FUROSEMIDE 60 MG: 10 INJECTION, SOLUTION INTRAMUSCULAR; INTRAVENOUS at 05:10

## 2022-10-13 RX ADMIN — HYDRALAZINE HYDROCHLORIDE 100 MG: 25 TABLET, FILM COATED ORAL at 09:10

## 2022-10-13 RX ADMIN — POTASSIUM CHLORIDE 20 MEQ: 1500 TABLET, EXTENDED RELEASE ORAL at 08:10

## 2022-10-13 RX ADMIN — HEPARIN SODIUM 5000 UNITS: 5000 INJECTION INTRAVENOUS; SUBCUTANEOUS at 05:10

## 2022-10-13 RX ADMIN — AMLODIPINE BESYLATE 10 MG: 5 TABLET ORAL at 09:10

## 2022-10-13 RX ADMIN — SEVELAMER CARBONATE 800 MG: 800 TABLET, FILM COATED ORAL at 08:10

## 2022-10-13 RX ADMIN — METOLAZONE 5 MG: 2.5 TABLET ORAL at 09:10

## 2022-10-13 RX ADMIN — ISOSORBIDE MONONITRATE 120 MG: 60 TABLET, EXTENDED RELEASE ORAL at 08:10

## 2022-10-13 RX ADMIN — SEVELAMER CARBONATE 800 MG: 800 TABLET, FILM COATED ORAL at 02:10

## 2022-10-13 RX ADMIN — HYDRALAZINE HYDROCHLORIDE 100 MG: 25 TABLET, FILM COATED ORAL at 02:10

## 2022-10-13 RX ADMIN — CALCIUM CARBONATE (ANTACID) CHEW TAB 500 MG 1000 MG: 500 CHEW TAB at 04:10

## 2022-10-13 RX ADMIN — CARVEDILOL 12.5 MG: 6.25 TABLET, FILM COATED ORAL at 08:10

## 2022-10-13 RX ADMIN — ACETAMINOPHEN 1000 MG: 500 TABLET ORAL at 08:10

## 2022-10-13 RX ADMIN — CHOLECALCIFEROL TAB 125 MCG (5000 UNIT) 5000 UNITS: 125 TAB at 08:10

## 2022-10-13 RX ADMIN — HEPARIN SODIUM 5000 UNITS: 5000 INJECTION INTRAVENOUS; SUBCUTANEOUS at 09:10

## 2022-10-13 RX ADMIN — CARVEDILOL 25 MG: 25 TABLET, FILM COATED ORAL at 08:10

## 2022-10-13 RX ADMIN — CALCIUM CARBONATE (ANTACID) CHEW TAB 500 MG 1000 MG: 500 CHEW TAB at 02:10

## 2022-10-13 RX ADMIN — CALCIUM CARBONATE (ANTACID) CHEW TAB 500 MG 1000 MG: 500 CHEW TAB at 08:10

## 2022-10-13 NOTE — ASSESSMENT & PLAN NOTE
-Hydralazine 100 Q8H  -Imdur 120  -Coreg 25 BID  -Amlodipine 10  -PRN IV hydralazine and labetalol  -Continue IV Lasix diuresis  -Nephrology consulted for GORGE  -Cardiology consulted for elevated troponin  -Telemetry

## 2022-10-13 NOTE — PROGRESS NOTES
Nephrology Consult Note        Patient Name: João Ham  MRN: [id number]    Patient Class: IP- Inpatient   Admission Date: 10/10/2022  Length of Stay: 3 days  Date of Service: 10/13/2022    Attending Physician: Jono Knowles MD  Primary Care Provider: Primary Doctor No    Reason for Consult: clarke/ckd/uremia/acidosis/chf/htn/anemia    SUBJECTIVE:     HPI: 42M with little previous follow-up but a PMHx of treated HTN presents to the ED with progressive leg swelling, scrotal swelling, abdominal distention, and SOB over approximately 2 weeks with 1 episode of racing heart. Patient reports he has a good walk from his vehicle to his office at work and notes he had to stop to catch his breath. He denies prior similar episodes. He stopped his Amlodipine, Valsartin and HCTZ a week ago because he thought it was contributing to his swelling. Per my discussion with Dr. Fox, agree with cards eval and diuretic due to uncontrolled HTN,as well as renal US and UA. I added Phos, Mg, PTH, am vitamin D levels in AM to evaluate further.    10/11 VSS. Renal US is unremarkable, UA is bland. Vitamin D is low and PTH is high, add oral D3 and oral calcium supplement. Agree with cards recommendations and med changes.    10/12 VSS. Will quantify proteinuria. Etiology of such advanced CKD is unclear. Not sure that there is anything treatable, but a renal biopsy maybe important from prognostic stand point in this young man with possible dialysis and transplantation in the future. Will discuss with patient, hold blood thinners. Relax K restriction, give oral KCL. Continue diuretics.    101/3 VSS, sCr better, BP still high. Continu Lasix, add metolazone. Postpone kidney biopsy, re-evaluate in AM.    Past Medical History:   Diagnosis Date    Hypertension      No past surgical history on file.  No family history on file.       Review of patient's allergies indicates:  No Known Allergies    Outpatient meds:  No current facility-administered  medications on file prior to encounter.     Current Outpatient Medications on File Prior to Encounter   Medication Sig Dispense Refill    amlodipine-valsartan (EXFORGE)  mg per tablet Take 1 tablet by mouth once daily.      hydroCHLOROthiazide (HYDRODIURIL) 25 MG tablet Take 25 mg by mouth once daily.         Scheduled meds:   amLODIPine  10 mg Oral Daily    calcium carbonate  1,000 mg Oral TID WM    carvediloL  25 mg Oral BID    cholecalciferol (vitamin D3)  5,000 Units Oral Daily    furosemide (LASIX) injection  60 mg Intravenous Q12H    heparin (porcine)  5,000 Units Subcutaneous Q8H    hydrALAZINE  100 mg Oral Q8H    isosorbide mononitrate  120 mg Oral Daily    LIDOcaine  1 patch Transdermal Q24H    melatonin  6 mg Oral Nightly    potassium chloride  20 mEq Oral BID    sevelamer carbonate  800 mg Oral TID WM       Infusions:        PRN meds:      Review of Systems:    OBJECTIVE:     Vital Signs and IO:  Temp:  [97.6 °F (36.4 °C)-98.2 °F (36.8 °C)]   Pulse:  []   Resp:  [16]   BP: (150-186)/()   SpO2:  [98 %-100 %]   I/O last 3 completed shifts:  In: -   Out: 5525 [Urine:5525]  Wt Readings from Last 5 Encounters:   10/11/22 (!) 140.6 kg (310 lb)     Body mass index is 39.8 kg/m².    Physical Exam  Constitutional:       General: He is not in acute distress.     Appearance: He is well-developed. He is not diaphoretic.   HENT:      Head: Normocephalic and atraumatic.      Mouth/Throat:      Mouth: Mucous membranes are moist.   Eyes:      General: No scleral icterus.     Pupils: Pupils are equal, round, and reactive to light.   Cardiovascular:      Rate and Rhythm: Normal rate and regular rhythm.   Pulmonary:      Effort: Pulmonary effort is normal. No respiratory distress.      Breath sounds: No stridor.   Abdominal:      General: There is distension.      Palpations: Abdomen is soft.   Musculoskeletal:         General: Swelling present. No deformity. Normal range of motion.      Cervical back:  Neck supple.   Skin:     General: Skin is warm and dry.      Findings: No erythema or rash.   Neurological:      Mental Status: He is alert and oriented to person, place, and time.      Cranial Nerves: No cranial nerve deficit.   Psychiatric:         Behavior: Behavior normal.     Laboratory:  Recent Labs   Lab 10/11/22  0512 10/12/22  0417 10/13/22  0454   * 139 139   K 3.0* 3.3* 3.9   CL 99 101 102   CO2 20* 23 24   BUN 77* 71* 66*   CREATININE 7.5* 6.8* 6.5*   * 103 101       Recent Labs   Lab 10/11/22  0512 10/12/22  0417 10/13/22  0454   CALCIUM 6.8* 6.7* 7.1*   ALBUMIN 2.8* 2.7* 2.6*   PHOS 6.0* 5.6* 5.4*   MG 1.9 1.7 1.8       Recent Labs   Lab 10/10/22  1452 10/10/22  2005   PTH, Intact 632.4 H 517.7 H       No results for input(s): POCTGLUCOSE in the last 168 hours.    Recent Labs   Lab 10/10/22  2005   Hemoglobin A1C 5.5       Recent Labs   Lab 10/11/22  0512 10/12/22  0417 10/13/22  0454   WBC 7.41 8.50 8.41   HGB 10.5* 9.8* 9.3*   HCT 32.6* 29.6* 29.1*    352 313   MCV 87 87 88   MCHC 32.2 33.1 32.0   MONO 13.4  1.0 12.9  1.1* 15.1*  1.3*       Recent Labs   Lab 10/11/22  0512 10/12/22  0417 10/13/22  0454   BILITOT 0.7 0.6 0.5   PROT 5.9* 5.8* 5.8*   ALBUMIN 2.8* 2.7* 2.6*   ALKPHOS 107 95 80   ALT 86* 69* 48*   AST 22 17 9*       Recent Labs   Lab 10/10/22  1645   Color, UA Yellow   Appearance, UA Clear   pH, UA 6.0   Specific Gravity, UA 1.025   Protein, UA 2+ A   Glucose, UA Negative   Ketones, UA Negative   Urobilinogen, UA Negative   Bilirubin (UA) Negative   Occult Blood UA 1+ A   Nitrite, UA Negative   RBC, UA 1   WBC, UA 0   Bacteria None   Hyaline Casts, UA 0             Microbiology Results (last 7 days)       ** No results found for the last 168 hours. 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 HTN nephrosclerosis  Acidosis  Hypokalemia  Uremia  Fluid overload  CHF  No NSAIDs, ACEI/ARB, IV contrast or other nephrotoxins.  Keep  MAP > 60, SBP > 100.  Dose meds for GFR < 30 ml/min.  Relax diet, add oral KCL.  UA is bland with proteinuria. Renal US is unremarkable.  No emergency dialysis needs.  Discussed diagnostic and prognostic renal biopsy with patient, will quantify proteinuria, obtain hepatitis panel. Postpone until BP is better controlled.    Anemia of CKD  Hgb and HCT are acceptable. Monitor for now.  Will provide LOR and/or IV iron PRN.    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 CKD-MBD vs reactive) and vitamin D levels are low.   Started oral D3 and Ca supplement.  Added phos binders.    HTN  Fluid overload  BP seem high and uncontrolled.   Tolerate asymptomatic HTN up to -160.  Low sodium diet.  Agree with lasix IV, niseritide gtt and Cards recommendations.  Add metolazone.    Thank you for allowing us to participate in the care of your patient!   We will follow the patient and provide recommendations as needed.    Patient care time was spent personally by me on the following activities:     Obtaining a history.  Examination of patient.  Providing medical care at the patients bedside.  Developing a treatment plan with patient or surrogate and bedside caregivers.  Ordering and reviewing laboratory studies, radiographic studies, pulse oximetry.  Ordering and performing treatments and interventions.  Evaluation of patient's response to treatment.  Discussions with consultants while on the unit and immediately available to the patient.  Re-evaluation of the patient's condition.  Documentation in the medical record.     Jeremy Madrid MD    [facility]  [address] [place] [address]  [place] [id number]    [phone] - tel  [phone] - fax    10/13/2022

## 2022-10-13 NOTE — ASSESSMENT & PLAN NOTE
Renal ultrasound shows no obstructive disease. Urine sodium 91.   -Nephrology consulted  -Monitor UOP and electrolytes  -IV Lasix diuresis  -BP control  -Trend creatinine  -Renally dose medications  -Avoid nephrotoxic agents; hold home ARB and thiazide diuretic  -Nephrology considering possible kidney biopsy

## 2022-10-13 NOTE — EICU
Rounding (Video Assessment):  Yes    Intervention Initiated From:  [facility] / EICU    Comments:  Video assessment complete. Pt awake in bed, voices no complaints. No acute distress noted. VS per flowsheet.

## 2022-10-13 NOTE — PROGRESS NOTES
Formerly [facility], [facility]  Department of Cardiology  Progress Note    PATIENT NAME: João Ham  MRN: [id number]  TODAY'S DATE: 10/13/2022  ADMIT DATE: 10/10/2022    SUBJECTIVE     PRINCIPLE PROBLEM: Acute renal failure    INTERVAL HISTORY:    10/13/2022  Thirty feel better blood pressure improving    Review of patient's allergies indicates:  No Known Allergies    REVIEW OF SYSTEMS  CARDIOVASCULAR: No recent chest pain, palpitations, arm, neck, or jaw pain  RESPIRATORY: No recent fever, cough chills, SOB or congestion  : No blood in the urine  GI: No Nausea, vomiting, constipation, diarrhea, blood, or reflux.  MUSCULOSKELETAL: No myalgias  NEURO: No lightheadedness or dizziness  EYES: No Double vision, blurry, vision or headache     OBJECTIVE     VITAL SIGNS (Most Recent)  Temp: 97.9 °F (36.6 °C) (10/13/22 0300)  Pulse: 85 (10/13/22 0700)  Resp: 16 (10/13/22 0300)  BP: (!) 177/110 (10/13/22 0700)  SpO2: 98 % (10/13/22 0705)    VENTILATION STATUS  Resp: 16 (10/13/22 0300)  SpO2: 98 % (10/13/22 0705)       I & O (Last 24H):  Intake/Output Summary (Last 24 hours) at 10/13/2022 1037  Last data filed at 10/13/2022 0729  Gross per 24 hour   Intake --   Output 3425 ml   Net -3425 ml       WEIGHTS  Wt Readings from Last 3 Encounters:   10/11/22 0902 (!) 140.6 kg (310 lb)   10/10/22 2015 (!) 140.7 kg (310 lb 3 oz)   10/10/22 1340 117.9 kg (260 lb)       PHYSICAL EXAM  CONSTITUTIONAL: Well built, well nourished in no apparent distress  NECK: no carotid bruit, no JVD  LUNGS: CTA  CHEST WALL: no tenderness  HEART: regular rate and rhythm, S1, S2 normal, no murmur, click, rub or gallop   ABDOMEN: soft, non-tender; bowel sounds normal; no masses,  no organomegaly  EXTREMITIES: Extremities normal, no edema  NEURO: AAO X 3    SCHEDULED MEDS:   amLODIPine  10 mg Oral Daily    calcium carbonate  1,000 mg Oral TID WM    carvediloL  25 mg Oral BID    cholecalciferol (vitamin D3)  5,000 Units Oral Daily    furosemide (LASIX) injection   60 mg Intravenous Q12H    heparin (porcine)  5,000 Units Subcutaneous Q8H    hydrALAZINE  100 mg Oral Q8H    isosorbide mononitrate  120 mg Oral Daily    LIDOcaine  1 patch Transdermal Q24H    melatonin  6 mg Oral Nightly    metOLazone  5 mg Oral Daily    potassium chloride  20 mEq Oral BID    sevelamer carbonate  800 mg Oral TID WM       CONTINUOUS INFUSIONS:    PRN MEDS:acetaminophen, hydrALAZINE, labetaloL, naloxone, sodium chloride 0.9%    LABS AND DIAGNOSTICS     CBC LAST 3 DAYS  Recent Labs   Lab 10/11/22  0512 10/12/22  0417 10/13/22  0454   WBC 7.41 8.50 8.41   RBC 3.73* 3.39* 3.30*   HGB 10.5* 9.8* 9.3*   HCT 32.6* 29.6* 29.1*   MCV 87 87 88   MCH 28.2 28.9 28.2   MCHC 32.2 33.1 32.0   RDW 15.8* 15.9* 16.2*    352 313   MPV 9.9 10.4 9.9   GRAN 73.3*  5.4 75.5*  6.4 72.6  6.1   LYMPH 10.7*  0.8* 8.6*  0.7* 9.3*  0.8*   MONO 13.4  1.0 12.9  1.1* 15.1*  1.3*   BASO 0.02 0.02 0.03   NRBC 0 0 0       COAGULATION LAST 3 DAYS  Recent Labs   Lab 10/10/22  2005   INR 1.1   APTT 24.5       CHEMISTRY LAST 3 DAYS  Recent Labs   Lab 10/11/22  0512 10/12/22  0417 10/13/22  0454   * 139 139   K 3.0* 3.3* 3.9   CL 99 101 102   CO2 20* 23 24   ANIONGAP 16 15 13   BUN 77* 71* 66*   CREATININE 7.5* 6.8* 6.5*   * 103 101   CALCIUM 6.8* 6.7* 7.1*   MG 1.9 1.7 1.8   ALBUMIN 2.8* 2.7* 2.6*   PROT 5.9* 5.8* 5.8*   ALKPHOS 107 95 80   ALT 86* 69* 48*   AST 22 17 9*   BILITOT 0.7 0.6 0.5       CARDIAC PROFILE LAST 3 DAYS  Recent Labs   Lab 10/10/22  1452 10/10/22  1738 10/10/22  2005 10/11/22  0044   BNP 3,678*  --   --   --    TROPONINI 0.476* 0.481* 0.472* 0.427*       ENDOCRINE LAST 3 DAYS  Recent Labs   Lab 10/10/22  2005   TSH 1.699       LAST ARTERIAL BLOOD GAS  ABG  No results for input(s): PH, PO2, PCO2, HCO3, BE in the last 168 hours.    LAST 7 DAYS MICROBIOLOGY   Microbiology Results (last 7 days)       ** No results found for the last 168 hours. **            MOST RECENT IMAGING  Echo  · The  estimated PA systolic pressure is 48 mmHg.  · The left ventricle is normal in size with mild concentric hypertrophy   and normal systolic function.  · Grade III left ventricular diastolic dysfunction.  · Moderate right ventricular enlargement with mildly to moderately reduced   right ventricular systolic function.  · Severe left atrial enlargement.  · There is moderate pulmonary hypertension.  · Intermediate central venous pressure (8 mmHg).  · Mild-to-moderate mitral regurgitation.  · Moderate to severe tricuspid regurgitation.  · Moderate right atrial enlargement.  · The estimated ejection fraction is 60%.  · Atrial fibrillation not observed.         [facility]  Results for orders placed during the hospital encounter of 10/10/22    Echo    Interpretation Summary  · The estimated PA systolic pressure is 48 mmHg.  · The left ventricle is normal in size with mild concentric hypertrophy and normal systolic function.  · Grade III left ventricular diastolic dysfunction.  · Moderate right ventricular enlargement with mildly to moderately reduced right ventricular systolic function.  · Severe left atrial enlargement.  · There is moderate pulmonary hypertension.  · Intermediate central venous pressure (8 mmHg).  · Mild-to-moderate mitral regurgitation.  · Moderate to severe tricuspid regurgitation.  · Moderate right atrial enlargement.  · The estimated ejection fraction is 60%.  · Atrial fibrillation not observed.      CURRENT/PREVIOUS VISIT EKG  Results for orders placed or performed during the hospital encounter of 10/10/22   EKG 12-lead    Collection Time: 10/10/22  1:52 PM    Narrative    Test Reason : R06.02,    Vent. Rate : 102 BPM     Atrial Rate : 102 BPM     P-R Int : 132 ms          QRS Dur : 098 ms      QT Int : 382 ms       P-R-T Axes : 079 -65 085 degrees     QTc Int : 497 ms    Sinus tachycardia  Right atrial enlargement  Left anterior fascicular block  Cannot rule out Anterior infarct ,age  undetermined  Abnormal ECG  When compared with ECG of 10-OCT-2022 13:49,  Previous ECG has undetermined rhythm, needs review    Referred By: [email or web address]   SELF           Confirmed By:        ASSESSMENT/PLAN:     Active Hospital Problems    Diagnosis    *Acute renal failure    Hypertensive emergency    Anemia    Hypocalcemia    Elevated LFTs    Hypoalbuminemia    Obesity (BMI 30-39.9)    Elevated troponin    Acute heart failure with preserved ejection fraction       ASSESSMENT & PLAN:   Malignant hypertension currently improving amlodipine added today  Acute kidney injury improving  CHF improving  RECOMMENDATIONS:  Continue to adjust medications amlodipine has it down to 154/95, 146 or 83        Dawson Garsia MD  [facility]  Department of Cardiology  Date of Service: 10/13/2022  10:37 AM

## 2022-10-13 NOTE — ASSESSMENT & PLAN NOTE
TTE shows grade III diastolic dysfunction.  -IV Lasix diuresis  -Cardiology consulted  -Hydralazine and Imdur  -Coreg 25 BID  -Telemetry  -Strict I's and O's  -Keep K > 4 and Mg > 2; caution with repletion given kidney failure

## 2022-10-13 NOTE — PLAN OF CARE
Problem: Adult Inpatient Plan of Care  Goal: Plan of Care Review  Outcome: Ongoing, Progressing     Problem: Fluid and Electrolyte Imbalance (Acute Kidney Injury/Impairment)  Goal: Fluid and Electrolyte Balance  Outcome: Ongoing, Progressing     Problem: Renal Function Impairment (Acute Kidney Injury/Impairment)  Goal: Effective Renal Function  Outcome: Ongoing, Progressing     Problem: Fall Injury Risk  Goal: Absence of Fall and Fall-Related Injury  Outcome: Ongoing, Progressing

## 2022-10-13 NOTE — PROGRESS NOTES
[facility] Hospital Medicine  Progress Note    Patient Name: João Ham  MRN: [id number]  Patient Class: IP- Inpatient   Admission Date: 10/10/2022  Length of Stay: 3 days  Attending Physician: Jono Knowles MD  Primary Care Provider: Primary Doctor No        Subjective:     Principal Problem:Acute renal failure        HPI:  João Ham is a 42 year old male with a past medical history of HTN, obesity, and anemia who presents with multiple weeks of worsening swelling to the legs and scrotum associated with worsening shortness of breath. The patient thought that the swelling possibly was secondary to his blood pressure medication, so he stopped taking these medications multiple days ago. He denies any chest pain or palpitations. He denies any drug, tobacco or alcohol use. He has never experienced swelling of this severity before. He denies any issues with urination. He does endorse fatigue, changes in appetite and nausea and vomiting. While in the ED, the patient was given IV calcium, Dilaudid, and labetalol. A Cardene infusion was also started. Hospital Medicine was consulted for admission.      Overview/Hospital Course:  João Ham is a 42 year old male with a past medical history of HTN, obesity, and anemia who presents with multiple weeks of worsening swelling to the legs and scrotum associated with worsening shortness of breath secondary to hypertensive emergency with acute renal failure, demand ischemia and pulmonary edema. He was started on a Cardene infusion for BP control. He was also started on IV Lasix diuresis given volume overload. Nephrology and Cardiology have been consulted. A TTE shows grade III diastolic dysfunction. The patient is also hypocalcemic (likely from renal failure); calcium is being repleted as well as vitamin D. Nephrology is considering kidney biopsy at this time. He has been weaned from Cardene and is now on hydralazine 100 TID, Imdur 120, Coreg 25 BID, and  "amlodipine 10. PO blood pressure medications are currently being titrated.       Interval History: see "Hospital Course"    Review of Systems   Constitutional:  Positive for activity change and fatigue. Negative for appetite change and fever.   HENT: Negative.     Eyes: Negative.    Respiratory:  Negative for shortness of breath.    Cardiovascular:  Positive for leg swelling.   Gastrointestinal:  Negative for abdominal distention, abdominal pain, constipation, diarrhea, nausea and vomiting.   Endocrine: Negative.    Genitourinary:  Positive for scrotal swelling. Negative for difficulty urinating, flank pain and frequency.   Musculoskeletal: Negative.    Skin: Negative.    Allergic/Immunologic: Negative.    Neurological: Negative.    Hematological: Negative.    Psychiatric/Behavioral:  Negative for decreased concentration.    Objective:     Vital Signs (Most Recent):  Temp: 97.9 °F (36.6 °C) (10/13/22 0300)  Pulse: 85 (10/13/22 0700)  Resp: 16 (10/13/22 0300)  BP: (!) 177/110 (10/13/22 0700)  SpO2: 98 % (10/13/22 0705)   Vital Signs (24h Range):  Temp:  [97.6 °F (36.4 °C)-98.2 °F (36.8 °C)] 97.9 °F (36.6 °C)  Pulse:  [] 85  Resp:  [16] 16  SpO2:  [98 %-100 %] 98 %  BP: (150-186)/() 177/110     Weight: (!) 140.6 kg (310 lb)  Body mass index is 39.8 kg/m².    Intake/Output Summary (Last 24 hours) at 10/13/2022 0833  Last data filed at 10/13/2022 0729  Gross per 24 hour   Intake --   Output 3425 ml   Net -3425 ml      Physical Exam  Vitals and nursing note reviewed.   Constitutional:       General: He is not in acute distress.     Appearance: He is obese. He is ill-appearing.      Comments: Anasarca.   HENT:      Head: Normocephalic and atraumatic.      Right Ear: External ear normal.      Left Ear: External ear normal.      Nose: Nose normal.      Mouth/Throat:      Mouth: Mucous membranes are moist.      Pharynx: Oropharynx is clear.   Eyes:      Extraocular Movements: Extraocular movements intact.      " Conjunctiva/sclera: Conjunctivae normal.   Cardiovascular:      Rate and Rhythm: Normal rate and regular rhythm.      Pulses: Normal pulses.      Heart sounds: Normal heart sounds.   Pulmonary:      Effort: Pulmonary effort is normal. No respiratory distress.      Breath sounds: Rales present.   Abdominal:      General: Bowel sounds are normal. There is no distension.      Palpations: Abdomen is soft.      Tenderness: There is no abdominal tenderness. There is no right CVA tenderness or left CVA tenderness.   Genitourinary:     Comments: Scrotal swelling.  Musculoskeletal:         General: Normal range of motion.      Cervical back: Normal range of motion and neck supple.      Right lower leg: Edema present.      Left lower leg: Edema present.   Skin:     General: Skin is warm and dry.   Neurological:      General: No focal deficit present.      Mental Status: He is alert and oriented to person, place, and time. Mental status is at baseline.   Psychiatric:         Mood and Affect: Mood normal.         Behavior: Behavior normal.       Significant Labs: All pertinent labs within the past 24 hours have been reviewed.    Significant Imaging: I have reviewed all pertinent imaging results/findings within the past 24 hours.      Assessment/Plan:      * Acute renal failure  Renal ultrasound shows no obstructive disease. Urine sodium 91.   -Nephrology consulted  -Monitor UOP and electrolytes  -IV Lasix diuresis  -BP control  -Trend creatinine  -Renally dose medications  -Avoid nephrotoxic agents; hold home ARB and thiazide diuretic  -Nephrology considering possible kidney biopsy      Hypertensive emergency  -Hydralazine 100 Q8H  -Imdur 120  -Coreg 25 BID  -Amlodipine 10  -PRN IV hydralazine and labetalol  -Continue IV Lasix diuresis  -Nephrology consulted for GORGE  -Cardiology consulted for elevated troponin  -Telemetry    Acute heart failure with preserved ejection fraction  TTE shows grade III diastolic dysfunction.  -IV  Lasix diuresis  -Cardiology consulted  -Hydralazine and Imdur  -Coreg 25 BID  -Telemetry  -Strict I's and O's  -Keep K > 4 and Mg > 2; caution with repletion given kidney failure    Elevated troponin  In setting of HFpEF exacerbation and kidney failure.  -Telemetry  -Cardiology consulted  -BP control  -Continue diuresis    Obesity (BMI 30-39.9)  Body mass index is 39.8 kg/m². Morbid obesity complicates all aspects of disease management from diagnostic modalities to treatment.      Hypoalbuminemia  -Trend albumin with CMP      Elevated LFTs  Likely secondary to hepatic congestion.  -Trend LFTs with CMP      Hypocalcemia  PTH elevated. Low vitamin D.  -Trend ionized calcium  -Scheduled calcium and vitamin D repletion  -Replete PRN  -Nephrology consulted      Anemia  Iron level low and ferritin at low limit of normal. Patient may benefit from iron repletion.  -Trend Hgb with CBC  -Iron repletion per Nephrology        VTE Risk Mitigation (From admission, onward)         Ordered     heparin (porcine) injection 5,000 Units  Every 8 hours         10/10/22 1955     IP VTE HIGH RISK PATIENT  Once         10/10/22 1955     Place sequential compression device  Until discontinued         10/10/22 1955                Discharge Planning   MARIA ISABEL: 10/17/2022     Code Status: Full Code   Is the patient medically ready for discharge?:     Reason for patient still in hospital (select all that apply): Patient trending condition, Laboratory test, Treatment and Consult recommendations  Discharge Plan A: Home with family                  Jono Knowles MD  Department of Hospital Medicine   [facility]

## 2022-10-13 NOTE — EICU
Rounding (Video Assessment):  Yes    Intervention Initiated From:  COR / LINDA    Comments: AA&O x3. No distress. Video rounding complete.

## 2022-10-13 NOTE — CARE UPDATE
10/13/22 0705   Patient Assessment/Suction   Level of Consciousness (AVPU) alert   All Lung Fields Breath Sounds clear   PRE-TX-O2   O2 Device (Oxygen Therapy) nasal cannula   $ Is the patient on Low Flow Oxygen? Yes   SpO2 98 %   Pulse Oximetry Type Intermittent   $ Pulse Oximetry - Multiple Charge Pulse Oximetry - Multiple

## 2022-10-14 PROBLEM — R79.89 ELEVATED LFTS: Status: RESOLVED | Noted: 2022-10-10 | Resolved: 2022-10-14

## 2022-10-14 LAB
ALBUMIN SERPL BCP-MCNC: 2.7 G/DL (ref 3.5–5.2)
ALP SERPL-CCNC: 77 U/L (ref 55–135)
ALT SERPL W/O P-5'-P-CCNC: 38 U/L (ref 10–44)
ANION GAP SERPL CALC-SCNC: 15 MMOL/L (ref 8–16)
AST SERPL-CCNC: 12 U/L (ref 10–40)
BASOPHILS # BLD AUTO: 0.02 K/UL (ref 0–0.2)
BASOPHILS NFR BLD: 0.3 % (ref 0–1.9)
BILIRUB SERPL-MCNC: 0.5 MG/DL (ref 0.1–1)
BUN SERPL-MCNC: 65 MG/DL (ref 6–20)
CA-I BLDV-SCNC: 0.96 MMOL/L (ref 1.06–1.42)
CALCIUM SERPL-MCNC: 7.7 MG/DL (ref 8.7–10.5)
CHLORIDE SERPL-SCNC: 99 MMOL/L (ref 95–110)
CO2 SERPL-SCNC: 26 MMOL/L (ref 23–29)
CREAT SERPL-MCNC: 6.5 MG/DL (ref 0.5–1.4)
DIFFERENTIAL METHOD: ABNORMAL
EOSINOPHIL # BLD AUTO: 0.2 K/UL (ref 0–0.5)
EOSINOPHIL NFR BLD: 2.4 % (ref 0–8)
ERYTHROCYTE [DISTWIDTH] IN BLOOD BY AUTOMATED COUNT: 16.3 % (ref 11.5–14.5)
EST. GFR  (NO RACE VARIABLE): 10 ML/MIN/1.73 M^2
GLUCOSE SERPL-MCNC: 96 MG/DL (ref 70–110)
HCT VFR BLD AUTO: 29.2 % (ref 40–54)
HGB BLD-MCNC: 9.5 G/DL (ref 14–18)
IMM GRANULOCYTES # BLD AUTO: 0.02 K/UL (ref 0–0.04)
IMM GRANULOCYTES NFR BLD AUTO: 0.3 % (ref 0–0.5)
INTERPRETATION SERPL IFE-IMP: NORMAL
LYMPHOCYTES # BLD AUTO: 0.9 K/UL (ref 1–4.8)
LYMPHOCYTES NFR BLD: 12.3 % (ref 18–48)
MAGNESIUM SERPL-MCNC: 1.8 MG/DL (ref 1.6–2.6)
MCH RBC QN AUTO: 28.7 PG (ref 27–31)
MCHC RBC AUTO-ENTMCNC: 32.5 G/DL (ref 32–36)
MCV RBC AUTO: 88 FL (ref 82–98)
MONOCYTES # BLD AUTO: 1.2 K/UL (ref 0.3–1)
MONOCYTES NFR BLD: 16.1 % (ref 4–15)
NEUTROPHILS # BLD AUTO: 4.9 K/UL (ref 1.8–7.7)
NEUTROPHILS NFR BLD: 68.6 % (ref 38–73)
NRBC BLD-RTO: 0 /100 WBC
PATHOLOGIST INTERPRETATION SPE: NORMAL
PATHOLOGIST INTERPRETATION UPE: NORMAL
PHOSPHATE SERPL-MCNC: 5.4 MG/DL (ref 2.7–4.5)
PLATELET # BLD AUTO: 305 K/UL (ref 150–450)
PMV BLD AUTO: 10.6 FL (ref 9.2–12.9)
POTASSIUM SERPL-SCNC: 3.9 MMOL/L (ref 3.5–5.1)
PROT PATTERN UR ELPH-IMP: NORMAL
PROT SERPL-MCNC: 6.1 G/DL (ref 6–8.4)
RBC # BLD AUTO: 3.31 M/UL (ref 4.6–6.2)
SODIUM SERPL-SCNC: 140 MMOL/L (ref 136–145)
WBC # BLD AUTO: 7.14 K/UL (ref 3.9–12.7)

## 2022-10-14 PROCEDURE — 80053 COMPREHEN METABOLIC PANEL: CPT | Performed by: STUDENT IN AN ORGANIZED HEALTH CARE EDUCATION/TRAINING PROGRAM

## 2022-10-14 PROCEDURE — 85025 COMPLETE CBC W/AUTO DIFF WBC: CPT | Performed by: STUDENT IN AN ORGANIZED HEALTH CARE EDUCATION/TRAINING PROGRAM

## 2022-10-14 PROCEDURE — 36415 COLL VENOUS BLD VENIPUNCTURE: CPT | Performed by: STUDENT IN AN ORGANIZED HEALTH CARE EDUCATION/TRAINING PROGRAM

## 2022-10-14 PROCEDURE — 63600175 PHARM REV CODE 636 W HCPCS: Performed by: INTERNAL MEDICINE

## 2022-10-14 PROCEDURE — 25000003 PHARM REV CODE 250: Performed by: NURSE PRACTITIONER

## 2022-10-14 PROCEDURE — 83735 ASSAY OF MAGNESIUM: CPT | Performed by: STUDENT IN AN ORGANIZED HEALTH CARE EDUCATION/TRAINING PROGRAM

## 2022-10-14 PROCEDURE — 27000221 HC OXYGEN, UP TO 24 HOURS

## 2022-10-14 PROCEDURE — 94761 N-INVAS EAR/PLS OXIMETRY MLT: CPT

## 2022-10-14 PROCEDURE — 63600175 PHARM REV CODE 636 W HCPCS: Performed by: STUDENT IN AN ORGANIZED HEALTH CARE EDUCATION/TRAINING PROGRAM

## 2022-10-14 PROCEDURE — 99223 PR INITIAL HOSPITAL CARE,LEVL III: ICD-10-PCS | Mod: ,,, | Performed by: GENERAL PRACTICE

## 2022-10-14 PROCEDURE — 82330 ASSAY OF CALCIUM: CPT | Performed by: STUDENT IN AN ORGANIZED HEALTH CARE EDUCATION/TRAINING PROGRAM

## 2022-10-14 PROCEDURE — 99223 1ST HOSP IP/OBS HIGH 75: CPT | Mod: ,,, | Performed by: GENERAL PRACTICE

## 2022-10-14 PROCEDURE — 25000003 PHARM REV CODE 250: Performed by: INTERNAL MEDICINE

## 2022-10-14 PROCEDURE — 25000003 PHARM REV CODE 250: Performed by: STUDENT IN AN ORGANIZED HEALTH CARE EDUCATION/TRAINING PROGRAM

## 2022-10-14 PROCEDURE — 12000002 HC ACUTE/MED SURGE SEMI-PRIVATE ROOM

## 2022-10-14 PROCEDURE — 84100 ASSAY OF PHOSPHORUS: CPT | Performed by: STUDENT IN AN ORGANIZED HEALTH CARE EDUCATION/TRAINING PROGRAM

## 2022-10-14 RX ORDER — CLONIDINE HYDROCHLORIDE 0.1 MG/1
0.1 TABLET ORAL 2 TIMES DAILY
Status: DISCONTINUED | OUTPATIENT
Start: 2022-10-14 | End: 2022-10-18 | Stop reason: HOSPADM

## 2022-10-14 RX ADMIN — AMLODIPINE BESYLATE 10 MG: 5 TABLET ORAL at 09:10

## 2022-10-14 RX ADMIN — SEVELAMER CARBONATE 800 MG: 800 TABLET, FILM COATED ORAL at 12:10

## 2022-10-14 RX ADMIN — MELATONIN TAB 3 MG 6 MG: 3 TAB at 10:10

## 2022-10-14 RX ADMIN — ACETAMINOPHEN 1000 MG: 500 TABLET ORAL at 10:10

## 2022-10-14 RX ADMIN — SEVELAMER CARBONATE 800 MG: 800 TABLET, FILM COATED ORAL at 05:10

## 2022-10-14 RX ADMIN — SEVELAMER CARBONATE 800 MG: 800 TABLET, FILM COATED ORAL at 09:10

## 2022-10-14 RX ADMIN — HEPARIN SODIUM 5000 UNITS: 5000 INJECTION INTRAVENOUS; SUBCUTANEOUS at 02:10

## 2022-10-14 RX ADMIN — HEPARIN SODIUM 5000 UNITS: 5000 INJECTION INTRAVENOUS; SUBCUTANEOUS at 05:10

## 2022-10-14 RX ADMIN — HYDRALAZINE HYDROCHLORIDE 100 MG: 25 TABLET, FILM COATED ORAL at 02:10

## 2022-10-14 RX ADMIN — HYDRALAZINE HYDROCHLORIDE 100 MG: 25 TABLET, FILM COATED ORAL at 10:10

## 2022-10-14 RX ADMIN — CALCIUM CARBONATE (ANTACID) CHEW TAB 500 MG 1000 MG: 500 CHEW TAB at 05:10

## 2022-10-14 RX ADMIN — CLONIDINE HYDROCHLORIDE 0.1 MG: 0.1 TABLET ORAL at 10:10

## 2022-10-14 RX ADMIN — CARVEDILOL 25 MG: 25 TABLET, FILM COATED ORAL at 09:10

## 2022-10-14 RX ADMIN — HEPARIN SODIUM 5000 UNITS: 5000 INJECTION INTRAVENOUS; SUBCUTANEOUS at 10:10

## 2022-10-14 RX ADMIN — CALCIUM CARBONATE (ANTACID) CHEW TAB 500 MG 1000 MG: 500 CHEW TAB at 12:10

## 2022-10-14 RX ADMIN — POTASSIUM CHLORIDE 20 MEQ: 1500 TABLET, EXTENDED RELEASE ORAL at 10:10

## 2022-10-14 RX ADMIN — HYDRALAZINE HYDROCHLORIDE 100 MG: 25 TABLET, FILM COATED ORAL at 05:10

## 2022-10-14 RX ADMIN — CARVEDILOL 25 MG: 25 TABLET, FILM COATED ORAL at 10:10

## 2022-10-14 RX ADMIN — ISOSORBIDE MONONITRATE 120 MG: 60 TABLET, EXTENDED RELEASE ORAL at 09:10

## 2022-10-14 RX ADMIN — FUROSEMIDE 60 MG: 10 INJECTION, SOLUTION INTRAMUSCULAR; INTRAVENOUS at 05:10

## 2022-10-14 RX ADMIN — POTASSIUM CHLORIDE 20 MEQ: 1500 TABLET, EXTENDED RELEASE ORAL at 09:10

## 2022-10-14 RX ADMIN — CLONIDINE HYDROCHLORIDE 0.1 MG: 0.1 TABLET ORAL at 09:10

## 2022-10-14 RX ADMIN — METOLAZONE 5 MG: 2.5 TABLET ORAL at 09:10

## 2022-10-14 RX ADMIN — CALCIUM CARBONATE (ANTACID) CHEW TAB 500 MG 1000 MG: 500 CHEW TAB at 09:10

## 2022-10-14 RX ADMIN — CHOLECALCIFEROL TAB 125 MCG (5000 UNIT) 5000 UNITS: 125 TAB at 09:10

## 2022-10-14 NOTE — ASSESSMENT & PLAN NOTE
TTE shows grade III diastolic dysfunction.  -IV Lasix diuresis with metolazone  -Cardiology consulted  -Hydralazine and Imdur  -Coreg 25 BID  -Telemetry  -Strict I's and O's  -Keep K > 4 and Mg > 2; caution with repletion given kidney failure

## 2022-10-14 NOTE — PROGRESS NOTES
[facility]  Department of Cardiology  Progress Note    PATIENT NAME: João Ham  MRN: [id number]  TODAY'S DATE: 10/14/2022  ADMIT DATE: 10/10/2022    SUBJECTIVE     PRINCIPLE PROBLEM: Acute renal failure    INTERVAL HISTORY:    10/14/2022   BLOOD PRESSURE STILL ELEVATED CARVEDILOL STARTED    Review of patient's allergies indicates:  No Known Allergies    REVIEW OF SYSTEMS  CARDIOVASCULAR: No recent chest pain, palpitations, arm, neck, or jaw pain  RESPIRATORY: No recent fever, cough chills, SOB or congestion  : No blood in the urine  GI: No Nausea, vomiting, constipation, diarrhea, blood, or reflux.  MUSCULOSKELETAL: No myalgias  NEURO: No lightheadedness or dizziness  EYES: No Double vision, blurry, vision or headache     OBJECTIVE     VITAL SIGNS (Most Recent)  Temp: 99 °F (37.2 °C) (10/14/22 0728)  Pulse: 81 (10/14/22 0910)  Resp: 16 (10/14/22 0910)  BP: (!) 166/91 (10/14/22 0728)  SpO2: 98 % (10/14/22 0910)    VENTILATION STATUS  Resp: 16 (10/14/22 0910)  SpO2: 98 % (10/14/22 0910)       I & O (Last 24H):  Intake/Output Summary (Last 24 hours) at 10/14/2022 1001  Last data filed at 10/14/2022 0549  Gross per 24 hour   Intake --   Output 2900 ml   Net -2900 ml       WEIGHTS  Wt Readings from Last 3 Encounters:   10/11/22 0902 (!) 140.6 kg (310 lb)   10/10/22 2015 (!) 140.7 kg (310 lb 3 oz)   10/10/22 1340 117.9 kg (260 lb)       PHYSICAL EXAM  CONSTITUTIONAL: Well built, well nourished in no apparent distress  NECK: no carotid bruit, no JVD  LUNGS: CTA  CHEST WALL: no tenderness  HEART: regular rate and rhythm, S1, S2 normal, no murmur, click, rub or gallop   ABDOMEN: soft, non-tender; bowel sounds normal; no masses,  no organomegaly  EXTREMITIES: Extremities normal, no edema  NEURO: AAO X 3    SCHEDULED MEDS:   amLODIPine  10 mg Oral Daily    calcium carbonate  1,000 mg Oral TID WM    carvediloL  25 mg Oral BID    cholecalciferol (vitamin D3)  5,000 Units Oral Daily    cloNIDine  0.1 mg Oral BID     furosemide (LASIX) injection  60 mg Intravenous Q12H    heparin (porcine)  5,000 Units Subcutaneous Q8H    hydrALAZINE  100 mg Oral Q8H    isosorbide mononitrate  120 mg Oral Daily    LIDOcaine  1 patch Transdermal Q24H    melatonin  6 mg Oral Nightly    metOLazone  5 mg Oral Daily    potassium chloride  20 mEq Oral BID    sevelamer carbonate  800 mg Oral TID WM       CONTINUOUS INFUSIONS:    PRN MEDS:acetaminophen, hydrALAZINE, labetaloL, naloxone, sodium chloride 0.9%    LABS AND DIAGNOSTICS     CBC LAST 3 DAYS  Recent Labs   Lab 10/12/22  0417 10/13/22  0454 10/14/22  0453   WBC 8.50 8.41 7.14   RBC 3.39* 3.30* 3.31*   HGB 9.8* 9.3* 9.5*   HCT 29.6* 29.1* 29.2*   MCV 87 88 88   MCH 28.9 28.2 28.7   MCHC 33.1 32.0 32.5   RDW 15.9* 16.2* 16.3*    313 305   MPV 10.4 9.9 10.6   GRAN 75.5*  6.4 72.6  6.1 68.6  4.9   LYMPH 8.6*  0.7* 9.3*  0.8* 12.3*  0.9*   MONO 12.9  1.1* 15.1*  1.3* 16.1*  1.2*   BASO 0.02 0.03 0.02   NRBC 0 0 0       COAGULATION LAST 3 DAYS  Recent Labs   Lab 10/10/22  2005   INR 1.1   APTT 24.5       CHEMISTRY LAST 3 DAYS  Recent Labs   Lab 10/12/22  0417 10/13/22  0454 10/14/22  0453    139 140   K 3.3* 3.9 3.9    102 99   CO2 23 24 26   ANIONGAP 15 13 15   BUN 71* 66* 65*   CREATININE 6.8* 6.5* 6.5*    101 96   CALCIUM 6.7* 7.1* 7.7*   MG 1.7 1.8 1.8   ALBUMIN 2.7* 2.6* 2.7*   PROT 5.8* 5.8* 6.1   ALKPHOS 95 80 77   ALT 69* 48* 38   AST 17 9* 12   BILITOT 0.6 0.5 0.5       CARDIAC PROFILE LAST 3 DAYS  Recent Labs   Lab 10/10/22  1452 10/10/22  1738 10/10/22  2005 10/11/22  0044 10/13/22  1227   BNP 3,678*  --   --   --   --    TROPONINI 0.476*   < > 0.472* 0.427* 0.289*    < > = values in this interval not displayed.       ENDOCRINE LAST 3 DAYS  Recent Labs   Lab 10/10/22  2005   TSH 1.699       LAST ARTERIAL BLOOD GAS  ABG  No results for input(s): PH, PO2, PCO2, HCO3, BE in the last 168 hours.    LAST 7 DAYS MICROBIOLOGY   Microbiology Results (last 7  days)       ** No results found for the last 168 hours. **            MOST RECENT IMAGING  Echo  · The estimated PA systolic pressure is 48 mmHg.  · The left ventricle is normal in size with mild concentric hypertrophy   and normal systolic function.  · Grade III left ventricular diastolic dysfunction.  · Moderate right ventricular enlargement with mildly to moderately reduced   right ventricular systolic function.  · Severe left atrial enlargement.  · There is moderate pulmonary hypertension.  · Intermediate central venous pressure (8 mmHg).  · Mild-to-moderate mitral regurgitation.  · Moderate to severe tricuspid regurgitation.  · Moderate right atrial enlargement.  · The estimated ejection fraction is 60%.  · Atrial fibrillation not observed.         [facility]  Results for orders placed during the hospital encounter of 10/10/22    Echo    Interpretation Summary  · The estimated PA systolic pressure is 48 mmHg.  · The left ventricle is normal in size with mild concentric hypertrophy and normal systolic function.  · Grade III left ventricular diastolic dysfunction.  · Moderate right ventricular enlargement with mildly to moderately reduced right ventricular systolic function.  · Severe left atrial enlargement.  · There is moderate pulmonary hypertension.  · Intermediate central venous pressure (8 mmHg).  · Mild-to-moderate mitral regurgitation.  · Moderate to severe tricuspid regurgitation.  · Moderate right atrial enlargement.  · The estimated ejection fraction is 60%.  · Atrial fibrillation not observed.      CURRENT/PREVIOUS VISIT EKG  Results for orders placed or performed during the hospital encounter of 10/10/22   EKG 12-lead    Collection Time: 10/13/22 11:32 AM    Narrative    Test Reason : R94.31,    Vent. Rate : 079 BPM     Atrial Rate : 079 BPM     P-R Int : 136 ms          QRS Dur : 096 ms      QT Int : 470 ms       P-R-T Axes : 061 -62 042 degrees     QTc Int : 538 ms    Normal sinus rhythm  Possible  Left atrial enlargement  Incomplete right bundle branch block  Left anterior fascicular block  LVH  Cannot rule out Septal infarct ,age undetermined  T wave abnormality, consider anterolateral ischemia  Prolonged QT  Abnormal ECG  When compared with ECG of 10-OCT-2022 13:54,  Incomplete right bundle branch block is now Present  Minimal criteria for Septal infarct are now Present    Referred By: [email or web address]   SELF           Confirmed By:        ASSESSMENT/PLAN:     Active Hospital Problems    Diagnosis    *Acute renal failure    Hypertensive emergency    Anemia    Hypocalcemia    Hypoalbuminemia    Obesity (BMI 30-39.9)    Elevated troponin    Acute heart failure with preserved ejection fraction       ASSESSMENT & PLAN:   MALIGNANT HYPERTENSION SECONDARY TO POOR MEDICAL COMPLIANCE.  HE WAS PRESCRIBED VALSARTAN AND AMLODIPINE WITH A DIURETIC PRIOR TO ADMISSION    CONGESTIVE HEART FAILURE PRESERVED EJECTION FRACTION  LEFT VENTRICULAR HYPERTROPHY  DIASTOLIC DYSFUNCTION TYPE 3  PULMONARY HYPERTENSION    RECOMMENDATIONS:  AGREE WITH ADDING CARVEDILOL, CLONIDINE  ELECTIVE STRESS TEST WHEN MORE STABLE CAN BE DONE AS OUTPATIENT    PATIENT STILL HAS MILD EDEMA IN THE EXTREMITIES.  HE CAN HAVE ELECTIVE STRESS TEST AT LATER DATE.    Dawson Garsia MD  [facility]  Department of Cardiology  Date of Service: 10/14/2022  10:01 AM

## 2022-10-14 NOTE — ASSESSMENT & PLAN NOTE
-Hydralazine 100 Q8H  -Imdur 120  -Coreg 25 BID  -Amlodipine 10  -Clonidine 0.1 BID  -PRN IV hydralazine and labetalol  -Continue IV Lasix diuresis with metolazone  -Nephrology consulted for GORGE  -Cardiology consulted for elevated troponin  -Telemetry

## 2022-10-14 NOTE — CARE UPDATE
10/13/22 1953   Patient Assessment/Suction   Level of Consciousness (AVPU) alert   Respiratory Effort Unlabored   PRE-TX-O2   O2 Device (Oxygen Therapy) nasal cannula   $ Is the patient on Low Flow Oxygen? Yes   Flow (L/min) 1   SpO2 100 %   Pulse Oximetry Type Intermittent   $ Pulse Oximetry - Multiple Charge Pulse Oximetry - Multiple   Pulse 88   Resp 18

## 2022-10-14 NOTE — CONSULTS
Nephrology Consult Note        Patient Name: João Ham  MRN: [id number]    Patient Class: IP- Inpatient   Admission Date: 10/10/2022  Length of Stay: 4 days  Date of Service: 10/14/2022    Attending Physician: Jono Knowles MD  Primary Care Provider: Primary Doctor No    Reason for Consult: clarke/ckd/uremia/acidosis/chf/htn/anemia    SUBJECTIVE:     HPI: 42M with little previous follow-up but a PMHx of treated HTN presents to the ED with progressive leg swelling, scrotal swelling, abdominal distention, and SOB over approximately 2 weeks with 1 episode of racing heart. Patient reports he has a good walk from his vehicle to his office at work and notes he had to stop to catch his breath. He denies prior similar episodes. He stopped his Amlodipine, Valsartin and HCTZ a week ago because he thought it was contributing to his swelling. Per my discussion with Dr. Fox, agree with cards eval and diuretic due to uncontrolled HTN,as well as renal US and UA. I added Phos, Mg, PTH, am vitamin D levels in AM to evaluate further.    10/11 VSS. Renal US is unremarkable, UA is bland. Vitamin D is low and PTH is high, add oral D3 and oral calcium supplement. Agree with cards recommendations and med changes.    10/12 VSS. Will quantify proteinuria. Etiology of such advanced CKD is unclear. Not sure that there is anything treatable, but a renal biopsy maybe important from prognostic stand point in this young man with possible dialysis and transplantation in the future. Will discuss with patient, hold blood thinners. Relax K restriction, give oral KCL. Continue diuretics.    10/13 VSS, sCr better, BP still high. Continu Lasix, add metolazone. Postpone kidney biopsy, re-evaluate in AM.    10/14 VSS, no new complains, edema better. Re-evaluate in AM. Postpone kidney biopsy for next week or outpatient.    Past Medical History:   Diagnosis Date    Hypertension      No past surgical history on file.  No family history on file.        Review of patient's allergies indicates:  No Known Allergies    Outpatient meds:  No current facility-administered medications on file prior to encounter.     Current Outpatient Medications on File Prior to Encounter   Medication Sig Dispense Refill    amlodipine-valsartan (EXFORGE)  mg per tablet Take 1 tablet by mouth once daily.      hydroCHLOROthiazide (HYDRODIURIL) 25 MG tablet Take 25 mg by mouth once daily.         Scheduled meds:   amLODIPine  10 mg Oral Daily    calcium carbonate  1,000 mg Oral TID WM    carvediloL  25 mg Oral BID    cholecalciferol (vitamin D3)  5,000 Units Oral Daily    cloNIDine  0.1 mg Oral BID    furosemide (LASIX) injection  60 mg Intravenous Q12H    heparin (porcine)  5,000 Units Subcutaneous Q8H    hydrALAZINE  100 mg Oral Q8H    isosorbide mononitrate  120 mg Oral Daily    LIDOcaine  1 patch Transdermal Q24H    melatonin  6 mg Oral Nightly    metOLazone  5 mg Oral Daily    potassium chloride  20 mEq Oral BID    sevelamer carbonate  800 mg Oral TID WM       Infusions:        PRN meds:      Review of Systems:    OBJECTIVE:     Vital Signs and IO:  Temp:  [97.3 °F (36.3 °C)-99 °F (37.2 °C)]   Pulse:  [73-91]   Resp:  [16-18]   BP: (130-169)/(76-96)   SpO2:  [96 %-100 %]   I/O last 3 completed shifts:  In: -   Out: 4750 [Urine:4750]  Wt Readings from Last 5 Encounters:   10/11/22 (!) 140.6 kg (310 lb)     Body mass index is 39.8 kg/m².    Physical Exam  Constitutional:       General: He is not in acute distress.     Appearance: He is well-developed. He is not diaphoretic.   HENT:      Head: Normocephalic and atraumatic.      Mouth/Throat:      Mouth: Mucous membranes are moist.   Eyes:      General: No scleral icterus.     Pupils: Pupils are equal, round, and reactive to light.   Cardiovascular:      Rate and Rhythm: Normal rate and regular rhythm.   Pulmonary:      Effort: Pulmonary effort is normal. No respiratory distress.      Breath sounds: No stridor.   Abdominal:       General: There is distension.      Palpations: Abdomen is soft.   Musculoskeletal:         General: Swelling present. No deformity. Normal range of motion.      Cervical back: Neck supple.   Skin:     General: Skin is warm and dry.      Findings: No erythema or rash.   Neurological:      Mental Status: He is alert and oriented to person, place, and time.      Cranial Nerves: No cranial nerve deficit.   Psychiatric:         Behavior: Behavior normal.     Laboratory:  Recent Labs   Lab 10/12/22  0417 10/13/22  0454 10/14/22  0453    139 140   K 3.3* 3.9 3.9    102 99   CO2 23 24 26   BUN 71* 66* 65*   CREATININE 6.8* 6.5* 6.5*    101 96       Recent Labs   Lab 10/12/22  0417 10/13/22  0454 10/14/22  0453   CALCIUM 6.7* 7.1* 7.7*   ALBUMIN 2.7* 2.6* 2.7*   PHOS 5.6* 5.4* 5.4*   MG 1.7 1.8 1.8       Recent Labs   Lab 10/10/22  1452 10/10/22  2005   PTH, Intact 632.4 H 517.7 H       No results for input(s): POCTGLUCOSE in the last 168 hours.    Recent Labs   Lab 10/10/22  2005   Hemoglobin A1C 5.5       Recent Labs   Lab 10/12/22  0417 10/13/22  0454 10/14/22  0453   WBC 8.50 8.41 7.14   HGB 9.8* 9.3* 9.5*   HCT 29.6* 29.1* 29.2*    313 305   MCV 87 88 88   MCHC 33.1 32.0 32.5   MONO 12.9  1.1* 15.1*  1.3* 16.1*  1.2*       Recent Labs   Lab 10/12/22  0417 10/13/22  0454 10/14/22  0453   BILITOT 0.6 0.5 0.5   PROT 5.8* 5.8* 6.1   ALBUMIN 2.7* 2.6* 2.7*   ALKPHOS 95 80 77   ALT 69* 48* 38   AST 17 9* 12       Recent Labs   Lab 10/10/22  1645   Color, UA Yellow   Appearance, UA Clear   pH, UA 6.0   Specific Gravity, UA 1.025   Protein, UA 2+ A   Glucose, UA Negative   Ketones, UA Negative   Urobilinogen, UA Negative   Bilirubin (UA) Negative   Occult Blood UA 1+ A   Nitrite, UA Negative   RBC, UA 1   WBC, UA 0   Bacteria None   Hyaline Casts, UA 0             Microbiology Results (last 7 days)       ** No results found for the last 168 hours. **            ASSESSMENT/PLAN:     Non-oliguric GORGE  and/or proteinuric CKD stage 4-5 ? HTN nephrosclerosis  Acidosis  Hypokalemia  Uremia  Fluid overload  CHF  No NSAIDs, ACEI/ARB, IV contrast or other nephrotoxins.  Keep MAP > 60, SBP > 100.  Dose meds for GFR < 30 ml/min.  Relax diet, added oral KCL.  UA is bland with proteinuria.   Renal US is unremarkable.  No emergency dialysis needs.  Discussed diagnostic and prognostic renal biopsy with patient. Postpone until BP is better controlled.    Anemia of CKD  Hgb and HCT are acceptable. Monitor for now.  Will provide LOR and/or IV iron PRN.    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 CKD-MBD vs reactive) and vitamin D levels are low.   Started oral D3 and Ca supplement.  Added phos binders.    HTN  Fluid overload  BP seem high and uncontrolled.   Tolerate asymptomatic HTN up to -160.  Low sodium diet.  Agree with lasix IV, niseritide gtt and Cards recommendations.  Added metolazone.    Thank you for allowing us to participate in the care of your patient!   We will follow the patient and provide recommendations as needed.    Patient care time was spent personally by me on the following activities:     Obtaining a history.  Examination of patient.  Providing medical care at the patients bedside.  Developing a treatment plan with patient or surrogate and bedside caregivers.  Ordering and reviewing laboratory studies, radiographic studies, pulse oximetry.  Ordering and performing treatments and interventions.  Evaluation of patient's response to treatment.  Discussions with consultants while on the unit and immediately available to the patient.  Re-evaluation of the patient's condition.  Documentation in the medical record.     Jeremy Madrid MD    [facility]  [address] [place] [address]  [place] [id number]    [phone] - tel  [phone] - fax    10/14/2022

## 2022-10-14 NOTE — SUBJECTIVE & OBJECTIVE
"Interval History: see "Hospital Course"    Review of Systems   Constitutional:  Positive for activity change and fatigue. Negative for appetite change and fever.   HENT: Negative.     Eyes: Negative.    Respiratory:  Negative for shortness of breath.    Cardiovascular:  Positive for leg swelling.   Gastrointestinal:  Negative for abdominal distention, abdominal pain, constipation, diarrhea, nausea and vomiting.   Endocrine: Negative.    Genitourinary:  Positive for scrotal swelling. Negative for difficulty urinating, flank pain and frequency.   Musculoskeletal: Negative.    Skin: Negative.    Allergic/Immunologic: Negative.    Neurological: Negative.    Hematological: Negative.    Psychiatric/Behavioral:  Negative for decreased concentration.    Objective:     Vital Signs (Most Recent):  Temp: 99 °F (37.2 °C) (10/14/22 0728)  Pulse: 88 (10/14/22 0728)  Resp: 18 (10/14/22 0728)  BP: (!) 166/91 (10/14/22 0728)  SpO2: 100 % (10/14/22 0728)   Vital Signs (24h Range):  Temp:  [97.3 °F (36.3 °C)-99 °F (37.2 °C)] 99 °F (37.2 °C)  Pulse:  [78-91] 88  Resp:  [17-18] 18  SpO2:  [93 %-100 %] 100 %  BP: (141-169)/() 166/91     Weight: (!) 140.6 kg (310 lb)  Body mass index is 39.8 kg/m².    Intake/Output Summary (Last 24 hours) at 10/14/2022 0858  Last data filed at 10/14/2022 0549  Gross per 24 hour   Intake --   Output 2900 ml   Net -2900 ml      Physical Exam  Vitals and nursing note reviewed.   Constitutional:       General: He is not in acute distress.     Appearance: He is obese. He is ill-appearing.      Comments: Anasarca.   HENT:      Head: Normocephalic and atraumatic.      Right Ear: External ear normal.      Left Ear: External ear normal.      Nose: Nose normal.      Mouth/Throat:      Mouth: Mucous membranes are moist.      Pharynx: Oropharynx is clear.   Eyes:      Extraocular Movements: Extraocular movements intact.      Conjunctiva/sclera: Conjunctivae normal.   Cardiovascular:      Rate and Rhythm: Normal " rate and regular rhythm.      Pulses: Normal pulses.      Heart sounds: Normal heart sounds.   Pulmonary:      Effort: Pulmonary effort is normal. No respiratory distress.      Breath sounds: No rales.   Abdominal:      General: Bowel sounds are normal. There is no distension.      Palpations: Abdomen is soft.      Tenderness: There is no abdominal tenderness. There is no right CVA tenderness or left CVA tenderness.   Genitourinary:     Comments: Scrotal swelling.  Musculoskeletal:         General: Normal range of motion.      Cervical back: Normal range of motion and neck supple.      Right lower leg: Edema present.      Left lower leg: Edema present.   Skin:     General: Skin is warm and dry.   Neurological:      General: No focal deficit present.      Mental Status: He is alert and oriented to person, place, and time. Mental status is at baseline.   Psychiatric:         Mood and Affect: Mood normal.         Behavior: Behavior normal.       Significant Labs: All pertinent labs within the past 24 hours have been reviewed.    Significant Imaging: I have reviewed all pertinent imaging results/findings within the past 24 hours.

## 2022-10-14 NOTE — ASSESSMENT & PLAN NOTE
In setting of HFpEF exacerbation and kidney failure. Improving.  -Telemetry  -Cardiology consulted  -BP control  -Continue diuresis

## 2022-10-14 NOTE — ASSESSMENT & PLAN NOTE
Renal ultrasound shows no obstructive disease. Urine sodium 91.   -Nephrology consulted  -Monitor UOP and electrolytes  -IV Lasix diuresis with metolazone  -BP control  -Trend creatinine  -Renally dose medications  -Avoid nephrotoxic agents; hold home ARB and thiazide diuretic  -Nephrology considering possible kidney biopsy; continue to control BP

## 2022-10-14 NOTE — PROGRESS NOTES
[facility] Hospital Medicine  Progress Note    Patient Name: João Ham  MRN: [id number]  Patient Class: IP- Inpatient   Admission Date: 10/10/2022  Length of Stay: 4 days  Attending Physician: Jono Knowles MD  Primary Care Provider: Primary Doctor No        Subjective:     Principal Problem:Acute renal failure        HPI:  João Ham is a 42 year old male with a past medical history of HTN, obesity, and anemia who presents with multiple weeks of worsening swelling to the legs and scrotum associated with worsening shortness of breath. The patient thought that the swelling possibly was secondary to his blood pressure medication, so he stopped taking these medications multiple days ago. He denies any chest pain or palpitations. He denies any drug, tobacco or alcohol use. He has never experienced swelling of this severity before. He denies any issues with urination. He does endorse fatigue, changes in appetite and nausea and vomiting. While in the ED, the patient was given IV calcium, Dilaudid, and labetalol. A Cardene infusion was also started. Hospital Medicine was consulted for admission.      Overview/Hospital Course:  João Ham is a 42 year old male with a past medical history of HTN, obesity, and anemia who presents with multiple weeks of worsening swelling to the legs and scrotum associated with worsening shortness of breath secondary to hypertensive emergency with acute renal failure, demand ischemia and pulmonary edema. He was started on a Cardene infusion for BP control. He was also started on IV Lasix diuresis given volume overload. Nephrology and Cardiology have been consulted. A TTE shows grade III diastolic dysfunction. The patient is also hypocalcemic (likely from renal failure); calcium is being repleted as well as vitamin D. Nephrology is considering kidney biopsy at this time. He has been weaned from Cardene and is now on hydralazine 100 TID, Imdur 120, Coreg 25 BID,  "clonidine 0.1 BID and amlodipine 10. PO blood pressure medications are currently being titrated for BP control to allow for kidney biopsy.      Interval History: see "Hospital Course"    Review of Systems   Constitutional:  Positive for activity change and fatigue. Negative for appetite change and fever.   HENT: Negative.     Eyes: Negative.    Respiratory:  Negative for shortness of breath.    Cardiovascular:  Positive for leg swelling.   Gastrointestinal:  Negative for abdominal distention, abdominal pain, constipation, diarrhea, nausea and vomiting.   Endocrine: Negative.    Genitourinary:  Positive for scrotal swelling. Negative for difficulty urinating, flank pain and frequency.   Musculoskeletal: Negative.    Skin: Negative.    Allergic/Immunologic: Negative.    Neurological: Negative.    Hematological: Negative.    Psychiatric/Behavioral:  Negative for decreased concentration.    Objective:     Vital Signs (Most Recent):  Temp: 99 °F (37.2 °C) (10/14/22 0728)  Pulse: 88 (10/14/22 0728)  Resp: 18 (10/14/22 0728)  BP: (!) 166/91 (10/14/22 0728)  SpO2: 100 % (10/14/22 0728)   Vital Signs (24h Range):  Temp:  [97.3 °F (36.3 °C)-99 °F (37.2 °C)] 99 °F (37.2 °C)  Pulse:  [78-91] 88  Resp:  [17-18] 18  SpO2:  [93 %-100 %] 100 %  BP: (141-169)/() 166/91     Weight: (!) 140.6 kg (310 lb)  Body mass index is 39.8 kg/m².    Intake/Output Summary (Last 24 hours) at 10/14/2022 0858  Last data filed at 10/14/2022 0549  Gross per 24 hour   Intake --   Output 2900 ml   Net -2900 ml      Physical Exam  Vitals and nursing note reviewed.   Constitutional:       General: He is not in acute distress.     Appearance: He is obese. He is ill-appearing.      Comments: Anasarca.   HENT:      Head: Normocephalic and atraumatic.      Right Ear: External ear normal.      Left Ear: External ear normal.      Nose: Nose normal.      Mouth/Throat:      Mouth: Mucous membranes are moist.      Pharynx: Oropharynx is clear.   Eyes:      " Extraocular Movements: Extraocular movements intact.      Conjunctiva/sclera: Conjunctivae normal.   Cardiovascular:      Rate and Rhythm: Normal rate and regular rhythm.      Pulses: Normal pulses.      Heart sounds: Normal heart sounds.   Pulmonary:      Effort: Pulmonary effort is normal. No respiratory distress.      Breath sounds: No rales.   Abdominal:      General: Bowel sounds are normal. There is no distension.      Palpations: Abdomen is soft.      Tenderness: There is no abdominal tenderness. There is no right CVA tenderness or left CVA tenderness.   Genitourinary:     Comments: Scrotal swelling.  Musculoskeletal:         General: Normal range of motion.      Cervical back: Normal range of motion and neck supple.      Right lower leg: Edema present.      Left lower leg: Edema present.   Skin:     General: Skin is warm and dry.   Neurological:      General: No focal deficit present.      Mental Status: He is alert and oriented to person, place, and time. Mental status is at baseline.   Psychiatric:         Mood and Affect: Mood normal.         Behavior: Behavior normal.       Significant Labs: All pertinent labs within the past 24 hours have been reviewed.    Significant Imaging: I have reviewed all pertinent imaging results/findings within the past 24 hours.      Assessment/Plan:      * Acute renal failure  Renal ultrasound shows no obstructive disease. Urine sodium 91.   -Nephrology consulted  -Monitor UOP and electrolytes  -IV Lasix diuresis with metolazone  -BP control  -Trend creatinine  -Renally dose medications  -Avoid nephrotoxic agents; hold home ARB and thiazide diuretic  -Nephrology considering possible kidney biopsy; continue to control BP      Hypertensive emergency  -Hydralazine 100 Q8H  -Imdur 120  -Coreg 25 BID  -Amlodipine 10  -Clonidine 0.1 BID  -PRN IV hydralazine and labetalol  -Continue IV Lasix diuresis with metolazone  -Nephrology consulted for GORGE  -Cardiology consulted for elevated  troponin  -Telemetry    Acute heart failure with preserved ejection fraction  TTE shows grade III diastolic dysfunction.  -IV Lasix diuresis with metolazone  -Cardiology consulted  -Hydralazine and Imdur  -Coreg 25 BID  -Telemetry  -Strict I's and O's  -Keep K > 4 and Mg > 2; caution with repletion given kidney failure    Elevated troponin  In setting of HFpEF exacerbation and kidney failure. Improving.  -Telemetry  -Cardiology consulted  -BP control  -Continue diuresis    Obesity (BMI 30-39.9)  Body mass index is 39.8 kg/m². Morbid obesity complicates all aspects of disease management from diagnostic modalities to treatment.      Hypoalbuminemia  -Trend albumin with CMP      Hypocalcemia  PTH elevated. Low vitamin D.  -Trend ionized calcium  -Scheduled calcium and vitamin D repletion  -Replete PRN  -Nephrology consulted      Anemia  Iron level low and ferritin at low limit of normal. Patient may benefit from iron repletion.  -Trend Hgb with CBC  -Iron repletion per Nephrology        VTE Risk Mitigation (From admission, onward)         Ordered     heparin (porcine) injection 5,000 Units  Every 8 hours         10/10/22 1955     IP VTE HIGH RISK PATIENT  Once         10/10/22 1955     Place sequential compression device  Until discontinued         10/10/22 1955                Discharge Planning   MARIA ISABEL: 10/18/2022     Code Status: Full Code   Is the patient medically ready for discharge?:     Reason for patient still in hospital (select all that apply): Patient trending condition, Laboratory test, Treatment and Consult recommendations  Discharge Plan A: Home with family                  Jono Knowles MD  Department of Hospital Medicine   [facility]

## 2022-10-15 PROBLEM — I15.9 SECONDARY HYPERTENSION: Status: ACTIVE | Noted: 2022-10-10

## 2022-10-15 LAB
ALBUMIN SERPL BCP-MCNC: 2.5 G/DL (ref 3.5–5.2)
ALP SERPL-CCNC: 69 U/L (ref 55–135)
ALT SERPL W/O P-5'-P-CCNC: 40 U/L (ref 10–44)
ANION GAP SERPL CALC-SCNC: 11 MMOL/L (ref 8–16)
AST SERPL-CCNC: 20 U/L (ref 10–40)
BASOPHILS # BLD AUTO: 0.02 K/UL (ref 0–0.2)
BASOPHILS NFR BLD: 0.3 % (ref 0–1.9)
BILIRUB SERPL-MCNC: 0.4 MG/DL (ref 0.1–1)
BUN SERPL-MCNC: 67 MG/DL (ref 6–20)
C3 SERPL-MCNC: 99 MG/DL (ref 50–180)
C4 SERPL-MCNC: 34 MG/DL (ref 11–44)
CA-I BLDV-SCNC: 0.96 MMOL/L (ref 1.06–1.42)
CALCIUM SERPL-MCNC: 7.8 MG/DL (ref 8.7–10.5)
CHLORIDE SERPL-SCNC: 96 MMOL/L (ref 95–110)
CO2 SERPL-SCNC: 29 MMOL/L (ref 23–29)
CREAT SERPL-MCNC: 6.7 MG/DL (ref 0.5–1.4)
DIFFERENTIAL METHOD: ABNORMAL
EOSINOPHIL # BLD AUTO: 0.2 K/UL (ref 0–0.5)
EOSINOPHIL NFR BLD: 2.8 % (ref 0–8)
ERYTHROCYTE [DISTWIDTH] IN BLOOD BY AUTOMATED COUNT: 16.1 % (ref 11.5–14.5)
EST. GFR  (NO RACE VARIABLE): 10 ML/MIN/1.73 M^2
GLUCOSE SERPL-MCNC: 97 MG/DL (ref 70–110)
HCT VFR BLD AUTO: 26.9 % (ref 40–54)
HGB BLD-MCNC: 8.6 G/DL (ref 14–18)
IMM GRANULOCYTES # BLD AUTO: 0.02 K/UL (ref 0–0.04)
IMM GRANULOCYTES NFR BLD AUTO: 0.3 % (ref 0–0.5)
LYMPHOCYTES # BLD AUTO: 1 K/UL (ref 1–4.8)
LYMPHOCYTES NFR BLD: 15.6 % (ref 18–48)
MAGNESIUM SERPL-MCNC: 1.8 MG/DL (ref 1.6–2.6)
MCH RBC QN AUTO: 27.9 PG (ref 27–31)
MCHC RBC AUTO-ENTMCNC: 32 G/DL (ref 32–36)
MCV RBC AUTO: 87 FL (ref 82–98)
MONOCYTES # BLD AUTO: 1 K/UL (ref 0.3–1)
MONOCYTES NFR BLD: 16.9 % (ref 4–15)
NEUTROPHILS # BLD AUTO: 3.9 K/UL (ref 1.8–7.7)
NEUTROPHILS NFR BLD: 64.1 % (ref 38–73)
NRBC BLD-RTO: 0 /100 WBC
PHOSPHATE SERPL-MCNC: 5.7 MG/DL (ref 2.7–4.5)
PLATELET # BLD AUTO: 293 K/UL (ref 150–450)
PMV BLD AUTO: 10.7 FL (ref 9.2–12.9)
POTASSIUM SERPL-SCNC: 3.9 MMOL/L (ref 3.5–5.1)
PROT SERPL-MCNC: 5.8 G/DL (ref 6–8.4)
RBC # BLD AUTO: 3.08 M/UL (ref 4.6–6.2)
SODIUM SERPL-SCNC: 136 MMOL/L (ref 136–145)
WBC # BLD AUTO: 6.14 K/UL (ref 3.9–12.7)

## 2022-10-15 PROCEDURE — 63600175 PHARM REV CODE 636 W HCPCS: Performed by: INTERNAL MEDICINE

## 2022-10-15 PROCEDURE — 27000221 HC OXYGEN, UP TO 24 HOURS

## 2022-10-15 PROCEDURE — 85025 COMPLETE CBC W/AUTO DIFF WBC: CPT | Performed by: STUDENT IN AN ORGANIZED HEALTH CARE EDUCATION/TRAINING PROGRAM

## 2022-10-15 PROCEDURE — 99233 PR SUBSEQUENT HOSPITAL CARE,LEVL III: ICD-10-PCS | Mod: ,,, | Performed by: INTERNAL MEDICINE

## 2022-10-15 PROCEDURE — 83735 ASSAY OF MAGNESIUM: CPT | Performed by: STUDENT IN AN ORGANIZED HEALTH CARE EDUCATION/TRAINING PROGRAM

## 2022-10-15 PROCEDURE — 25000003 PHARM REV CODE 250: Performed by: INTERNAL MEDICINE

## 2022-10-15 PROCEDURE — 36415 COLL VENOUS BLD VENIPUNCTURE: CPT | Performed by: INTERNAL MEDICINE

## 2022-10-15 PROCEDURE — 63600175 PHARM REV CODE 636 W HCPCS: Performed by: STUDENT IN AN ORGANIZED HEALTH CARE EDUCATION/TRAINING PROGRAM

## 2022-10-15 PROCEDURE — 80053 COMPREHEN METABOLIC PANEL: CPT | Performed by: STUDENT IN AN ORGANIZED HEALTH CARE EDUCATION/TRAINING PROGRAM

## 2022-10-15 PROCEDURE — 86038 ANTINUCLEAR ANTIBODIES: CPT | Performed by: INTERNAL MEDICINE

## 2022-10-15 PROCEDURE — 99233 SBSQ HOSP IP/OBS HIGH 50: CPT | Mod: ,,, | Performed by: INTERNAL MEDICINE

## 2022-10-15 PROCEDURE — 86255 FLUORESCENT ANTIBODY SCREEN: CPT | Mod: 59 | Performed by: INTERNAL MEDICINE

## 2022-10-15 PROCEDURE — 86160 COMPLEMENT ANTIGEN: CPT | Performed by: INTERNAL MEDICINE

## 2022-10-15 PROCEDURE — 82330 ASSAY OF CALCIUM: CPT | Performed by: STUDENT IN AN ORGANIZED HEALTH CARE EDUCATION/TRAINING PROGRAM

## 2022-10-15 PROCEDURE — 94761 N-INVAS EAR/PLS OXIMETRY MLT: CPT

## 2022-10-15 PROCEDURE — 83520 IMMUNOASSAY QUANT NOS NONAB: CPT | Performed by: INTERNAL MEDICINE

## 2022-10-15 PROCEDURE — 25000003 PHARM REV CODE 250: Performed by: STUDENT IN AN ORGANIZED HEALTH CARE EDUCATION/TRAINING PROGRAM

## 2022-10-15 PROCEDURE — 25000003 PHARM REV CODE 250: Performed by: NURSE PRACTITIONER

## 2022-10-15 PROCEDURE — 86160 COMPLEMENT ANTIGEN: CPT | Mod: 59 | Performed by: INTERNAL MEDICINE

## 2022-10-15 PROCEDURE — 97161 PT EVAL LOW COMPLEX 20 MIN: CPT

## 2022-10-15 PROCEDURE — 12000002 HC ACUTE/MED SURGE SEMI-PRIVATE ROOM

## 2022-10-15 PROCEDURE — 84100 ASSAY OF PHOSPHORUS: CPT | Performed by: STUDENT IN AN ORGANIZED HEALTH CARE EDUCATION/TRAINING PROGRAM

## 2022-10-15 RX ORDER — CALCITRIOL 0.25 UG/1
0.25 CAPSULE ORAL DAILY
Status: DISCONTINUED | OUTPATIENT
Start: 2022-10-15 | End: 2022-10-18 | Stop reason: HOSPADM

## 2022-10-15 RX ADMIN — ISOSORBIDE MONONITRATE 120 MG: 60 TABLET, EXTENDED RELEASE ORAL at 09:10

## 2022-10-15 RX ADMIN — CHOLECALCIFEROL TAB 125 MCG (5000 UNIT) 5000 UNITS: 125 TAB at 09:10

## 2022-10-15 RX ADMIN — HYDRALAZINE HYDROCHLORIDE 100 MG: 25 TABLET, FILM COATED ORAL at 01:10

## 2022-10-15 RX ADMIN — LIDOCAINE 1 PATCH: 50 PATCH CUTANEOUS at 01:10

## 2022-10-15 RX ADMIN — FUROSEMIDE 60 MG: 10 INJECTION, SOLUTION INTRAMUSCULAR; INTRAVENOUS at 06:10

## 2022-10-15 RX ADMIN — METOLAZONE 5 MG: 2.5 TABLET ORAL at 09:10

## 2022-10-15 RX ADMIN — HEPARIN SODIUM 5000 UNITS: 5000 INJECTION INTRAVENOUS; SUBCUTANEOUS at 06:10

## 2022-10-15 RX ADMIN — HEPARIN SODIUM 5000 UNITS: 5000 INJECTION INTRAVENOUS; SUBCUTANEOUS at 09:10

## 2022-10-15 RX ADMIN — SEVELAMER CARBONATE 800 MG: 800 TABLET, FILM COATED ORAL at 05:10

## 2022-10-15 RX ADMIN — CALCIUM CARBONATE (ANTACID) CHEW TAB 500 MG 1000 MG: 500 CHEW TAB at 11:10

## 2022-10-15 RX ADMIN — FUROSEMIDE 60 MG: 10 INJECTION, SOLUTION INTRAMUSCULAR; INTRAVENOUS at 05:10

## 2022-10-15 RX ADMIN — SEVELAMER CARBONATE 800 MG: 800 TABLET, FILM COATED ORAL at 09:10

## 2022-10-15 RX ADMIN — HYDRALAZINE HYDROCHLORIDE 100 MG: 25 TABLET, FILM COATED ORAL at 06:10

## 2022-10-15 RX ADMIN — CALCIUM CARBONATE (ANTACID) CHEW TAB 500 MG 1000 MG: 500 CHEW TAB at 09:10

## 2022-10-15 RX ADMIN — CARVEDILOL 25 MG: 25 TABLET, FILM COATED ORAL at 09:10

## 2022-10-15 RX ADMIN — CLONIDINE HYDROCHLORIDE 0.1 MG: 0.1 TABLET ORAL at 09:10

## 2022-10-15 RX ADMIN — HEPARIN SODIUM 5000 UNITS: 5000 INJECTION INTRAVENOUS; SUBCUTANEOUS at 01:10

## 2022-10-15 RX ADMIN — CALCITRIOL CAPSULES 0.25 MCG 0.25 MCG: 0.25 CAPSULE ORAL at 03:10

## 2022-10-15 RX ADMIN — HYDRALAZINE HYDROCHLORIDE 100 MG: 25 TABLET, FILM COATED ORAL at 09:10

## 2022-10-15 RX ADMIN — AMLODIPINE BESYLATE 10 MG: 5 TABLET ORAL at 09:10

## 2022-10-15 RX ADMIN — POTASSIUM CHLORIDE 20 MEQ: 1500 TABLET, EXTENDED RELEASE ORAL at 09:10

## 2022-10-15 RX ADMIN — CALCIUM CARBONATE (ANTACID) CHEW TAB 500 MG 1000 MG: 500 CHEW TAB at 05:10

## 2022-10-15 RX ADMIN — SEVELAMER CARBONATE 800 MG: 800 TABLET, FILM COATED ORAL at 11:10

## 2022-10-15 RX ADMIN — MELATONIN TAB 3 MG 6 MG: 3 TAB at 09:10

## 2022-10-15 NOTE — CARE UPDATE
10/14/22 1926   Patient Assessment/Suction   Level of Consciousness (AVPU) alert   Respiratory Effort Unlabored   PRE-TX-O2   O2 Device (Oxygen Therapy) nasal cannula   $ Is the patient on Low Flow Oxygen? Yes   Flow (L/min) 1   SpO2 99 %   Pulse Oximetry Type Intermittent   $ Pulse Oximetry - Multiple Charge Pulse Oximetry - Multiple   Pulse 83   Resp 19

## 2022-10-15 NOTE — ASSESSMENT & PLAN NOTE
TTE shows grade III diastolic dysfunction. Improving.  -IV Lasix diuresis with metolazone  -Cardiology consulted  -Hydralazine and Imdur  -Coreg 25 BID  -Telemetry  -Strict I's and O's  -Keep K > 4 and Mg > 2; caution with repletion given kidney failure

## 2022-10-15 NOTE — PT/OT/SLP EVAL
Physical Therapy Evaluation    Patient Name:  João Ham   MRN:  [id number]    Recommendations:     Discharge Recommendations:  home   Discharge Equipment Recommendations: none   Barriers to discharge: None    Assessment:     João Ham is a 42 y.o. male admitted with a medical diagnosis of Acute renal failure.  He presents with the following impairments/functional limitations:  weakness, impaired endurance, gait instability, impaired functional mobility, impaired cardiopulmonary response to activity  .    Rehab Prognosis: Good and Fair; patient would benefit from acute skilled PT services to address these deficits and reach maximum level of function.    Recent Surgery: * No surgery found *      Plan:     During this hospitalization, patient to be seen 6 x/week to address the identified rehab impairments via gait training, therapeutic activities, therapeutic exercises and progress toward the following goals:    Plan of Care Expires:  10/31/22    Subjective     Patient/Family Comments/goals: agrees to work with PT to walk in foster, then wants to go to bathroom    Living Environment:  House with nephew, ) steps to enter  Prior to admission, patients level of function was independent without AD.  Equipment used at home: none.  DME owned (not currently used): none.  Upon discharge, patient will have assistance from family.    Objective:     Communicated with nurse (Veda) prior to session.  Patient found HOB elevated with telemetry  upon PT entry to room.    General Precautions: Standard, fall   Orthopedic Precautions:N/A   Braces: N/A  Respiratory Status: Room air    Functional Mobility training:  Bed Mobility:     Rolling Right: stand by assistance  Supine to Sit: stand by assistance  Transfers:     Sit to Stand:  stand by assistance with no AD  Gait: 90' with SBA    Therapeutic Activities and Exercises:   Mobility training as above with cues for technique    AM-PAC 6 CLICK MOBILITY  Total Score:18     Patient left   in bathroom  with call button in reach, nurse (Veda) notified, and visitor present.    GOALS:   Multidisciplinary Problems       Physical Therapy Goals          Problem: Physical Therapy    Goal Priority Disciplines Outcome Goal Variances Interventions   Physical Therapy Goal     PT, PT/OT Ongoing, Progressing     Description: Goals to be met by: 10/31/2022     Patient will increase functional independence with mobility by performin). Supine to sit with Modified [place]  2). Sit to supine with Modified [place]  3). Sit to stand transfer with Modified [place]  4). Gait  x > 100 feet with Modified [place].                          History:     Past Medical History:   Diagnosis Date    Hypertension        No past surgical history on file.    Time Tracking:     PT Received On: 10/15/22  PT Start Time: 1043     PT Stop Time: 1058  PT Total Time (min): 15 min     Billable Minutes: Evaluation 15      10/15/2022

## 2022-10-15 NOTE — ASSESSMENT & PLAN NOTE
-Hydralazine 100 Q8H  -Imdur 120  -Coreg 25 BID  -Amlodipine 10  -Clonidine 0.1 BID  -PRN IV hydralazine and labetalol  -Continue IV Lasix diuresis with metolazone  -Nephrology consulted for GORGE  -Cardiology consulted for elevated troponin  -Telemetry  -Start secondary HTN workup with renin/aldosterone ratio given hypokalemia on admission

## 2022-10-15 NOTE — SUBJECTIVE & OBJECTIVE
"Interval History: see "Hospital Course"    Review of Systems   Constitutional:  Positive for activity change and fatigue. Negative for appetite change and fever.   HENT: Negative.     Eyes: Negative.    Respiratory:  Negative for shortness of breath.    Cardiovascular:  Positive for leg swelling.   Gastrointestinal:  Negative for abdominal distention, abdominal pain, constipation, diarrhea, nausea and vomiting.   Endocrine: Negative.    Genitourinary:  Positive for scrotal swelling. Negative for difficulty urinating, flank pain and frequency.   Musculoskeletal: Negative.    Skin: Negative.    Allergic/Immunologic: Negative.    Neurological: Negative.    Hematological: Negative.    Psychiatric/Behavioral:  Negative for decreased concentration.    Objective:     Vital Signs (Most Recent):  Temp: 98.1 °F (36.7 °C) (10/15/22 0817)  Pulse: 77 (10/15/22 0817)  Resp: 17 (10/15/22 0817)  BP: 130/81 (10/15/22 0817)  SpO2: 100 % (10/15/22 0817) Vital Signs (24h Range):  Temp:  [97.4 °F (36.3 °C)-98.8 °F (37.1 °C)] 98.1 °F (36.7 °C)  Pulse:  [73-83] 77  Resp:  [16-19] 17  SpO2:  [96 %-100 %] 100 %  BP: (115-142)/(68-81) 130/81     Weight: (!) 140.6 kg (310 lb)  Body mass index is 39.8 kg/m².    Intake/Output Summary (Last 24 hours) at 10/15/2022 0859  Last data filed at 10/15/2022 0824  Gross per 24 hour   Intake --   Output 2075 ml   Net -2075 ml      Physical Exam  Vitals and nursing note reviewed.   Constitutional:       General: He is not in acute distress.     Appearance: He is obese. He is ill-appearing.      Comments: Anasarca.   HENT:      Head: Normocephalic and atraumatic.      Right Ear: External ear normal.      Left Ear: External ear normal.      Nose: Nose normal.      Mouth/Throat:      Mouth: Mucous membranes are moist.      Pharynx: Oropharynx is clear.   Eyes:      Extraocular Movements: Extraocular movements intact.      Conjunctiva/sclera: Conjunctivae normal.   Cardiovascular:      Rate and Rhythm: Normal " rate and regular rhythm.      Pulses: Normal pulses.      Heart sounds: Normal heart sounds.   Pulmonary:      Effort: Pulmonary effort is normal. No respiratory distress.      Breath sounds: No rales.   Abdominal:      General: Bowel sounds are normal. There is no distension.      Palpations: Abdomen is soft.      Tenderness: There is no abdominal tenderness. There is no right CVA tenderness or left CVA tenderness.   Genitourinary:     Comments: Scrotal swelling.  Musculoskeletal:         General: Normal range of motion.      Cervical back: Normal range of motion and neck supple.      Right lower leg: Edema present.      Left lower leg: Edema present.   Skin:     General: Skin is warm and dry.   Neurological:      General: No focal deficit present.      Mental Status: He is alert and oriented to person, place, and time. Mental status is at baseline.   Psychiatric:         Mood and Affect: Mood normal.         Behavior: Behavior normal.       Significant Labs: All pertinent labs within the past 24 hours have been reviewed.    Significant Imaging: I have reviewed all pertinent imaging results/findings within the past 24 hours.

## 2022-10-15 NOTE — PROGRESS NOTES
Nephrology Progress Note        Patient Name: João Ham  MRN: [id number]    Patient Class: IP- Inpatient   Admission Date: 10/10/2022  Length of Stay: 4 days  Date of Service: 10/14/2022    Attending Physician: Jono Knowles MD  Primary Care Provider: Primary Doctor No    Reason for Consult: clarke/ckd/uremia/acidosis/chf/htn/anemia    SUBJECTIVE:     HPI: 42M with little previous follow-up but a PMHx of treated HTN presents to the ED with progressive leg swelling, scrotal swelling, abdominal distention, and SOB over approximately 2 weeks with 1 episode of racing heart. Patient reports he has a good walk from his vehicle to his office at work and notes he had to stop to catch his breath. He denies prior similar episodes. He stopped his Amlodipine, Valsartin and HCTZ a week ago because he thought it was contributing to his swelling. Per my discussion with Dr. Fox, agree with cards eval and diuretic due to uncontrolled HTN,as well as renal US and UA. I added Phos, Mg, PTH, am vitamin D levels in AM to evaluate further.    10/11 VSS. Renal US is unremarkable, UA is bland. Vitamin D is low and PTH is high, add oral D3 and oral calcium supplement. Agree with cards recommendations and med changes.    10/12 VSS. Will quantify proteinuria. Etiology of such advanced CKD is unclear. Not sure that there is anything treatable, but a renal biopsy maybe important from prognostic stand point in this young man with possible dialysis and transplantation in the future. Will discuss with patient, hold blood thinners. Relax K restriction, give oral KCL. Continue diuretics.    10/13 VSS, sCr better, BP still high. Continu Lasix, add metolazone. Postpone kidney biopsy, re-evaluate in AM.    10/14 VSS, no new complains, edema better. Re-evaluate in AM. Postpone kidney biopsy for next week or outpatient.  10/15  UOP 1.3L, pressures much improved.  Renal function not improving.  No s/sx uremia, no complaints other than not sleeping well.       Past Medical History:   Diagnosis Date    Hypertension      No past surgical history on file.  No family history on file.       Review of patient's allergies indicates:  No Known Allergies    Outpatient meds:  No current facility-administered medications on file prior to encounter.     Current Outpatient Medications on File Prior to Encounter   Medication Sig Dispense Refill    amlodipine-valsartan (EXFORGE)  mg per tablet Take 1 tablet by mouth once daily.      hydroCHLOROthiazide (HYDRODIURIL) 25 MG tablet Take 25 mg by mouth once daily.         Scheduled meds:   amLODIPine  10 mg Oral Daily    calcium carbonate  1,000 mg Oral TID WM    carvediloL  25 mg Oral BID    cholecalciferol (vitamin D3)  5,000 Units Oral Daily    cloNIDine  0.1 mg Oral BID    furosemide (LASIX) injection  60 mg Intravenous Q12H    heparin (porcine)  5,000 Units Subcutaneous Q8H    hydrALAZINE  100 mg Oral Q8H    isosorbide mononitrate  120 mg Oral Daily    LIDOcaine  1 patch Transdermal Q24H    melatonin  6 mg Oral Nightly    metOLazone  5 mg Oral Daily    potassium chloride  20 mEq Oral BID    sevelamer carbonate  800 mg Oral TID WM       Infusions:        PRN meds:      Review of Systems:    OBJECTIVE:     Vital Signs and IO:  Temp:  [97.3 °F (36.3 °C)-99 °F (37.2 °C)]   Pulse:  [73-91]   Resp:  [16-18]   BP: (130-169)/(76-96)   SpO2:  [96 %-100 %]   I/O last 3 completed shifts:  In: -   Out: 4750 [Urine:4750]  Wt Readings from Last 5 Encounters:   10/11/22 (!) 140.6 kg (310 lb)     Body mass index is 39.8 kg/m².    Physical Exam  Constitutional:       General: He is not in acute distress.     Appearance: He is well-developed. He is not diaphoretic.   HENT:      Head: Normocephalic and atraumatic.      Mouth/Throat:      Mouth: Mucous membranes are moist.   Eyes:      General: No scleral icterus.     Pupils: Pupils are equal, round, and reactive to light.   Cardiovascular:      Rate and Rhythm: Normal rate and regular rhythm.    Pulmonary:      Effort: Pulmonary effort is normal. No respiratory distress.      Breath sounds: No stridor.   Abdominal:      General: There is distension.      Palpations: Abdomen is soft.   Musculoskeletal:         General: Swelling present. No deformity. Normal range of motion.      Cervical back: Neck supple.   Skin:     General: Skin is warm and dry.      Findings: No erythema or rash.   Neurological:      Mental Status: He is alert and oriented to person, place, and time.      Cranial Nerves: No cranial nerve deficit.   Psychiatric:         Behavior: Behavior normal.     Laboratory:  Recent Labs   Lab 10/12/22  0417 10/13/22  0454 10/14/22  0453    139 140   K 3.3* 3.9 3.9    102 99   CO2 23 24 26   BUN 71* 66* 65*   CREATININE 6.8* 6.5* 6.5*    101 96       Recent Labs   Lab 10/12/22  0417 10/13/22  0454 10/14/22  0453   CALCIUM 6.7* 7.1* 7.7*   ALBUMIN 2.7* 2.6* 2.7*   PHOS 5.6* 5.4* 5.4*   MG 1.7 1.8 1.8       Recent Labs   Lab 10/10/22  1452 10/10/22  2005   PTH, Intact 632.4 H 517.7 H       No results for input(s): POCTGLUCOSE in the last 168 hours.    Recent Labs   Lab 10/10/22  2005   Hemoglobin A1C 5.5       Recent Labs   Lab 10/12/22  0417 10/13/22  0454 10/14/22  0453   WBC 8.50 8.41 7.14   HGB 9.8* 9.3* 9.5*   HCT 29.6* 29.1* 29.2*    313 305   MCV 87 88 88   MCHC 33.1 32.0 32.5   MONO 12.9  1.1* 15.1*  1.3* 16.1*  1.2*       Recent Labs   Lab 10/12/22  0417 10/13/22  0454 10/14/22  0453   BILITOT 0.6 0.5 0.5   PROT 5.8* 5.8* 6.1   ALBUMIN 2.7* 2.6* 2.7*   ALKPHOS 95 80 77   ALT 69* 48* 38   AST 17 9* 12       Recent Labs   Lab 10/10/22  1645   Color, UA Yellow   Appearance, UA Clear   pH, UA 6.0   Specific Gravity, UA 1.025   Protein, UA 2+ A   Glucose, UA Negative   Ketones, UA Negative   Urobilinogen, UA Negative   Bilirubin (UA) Negative   Occult Blood UA 1+ A   Nitrite, UA Negative   RBC, UA 1   WBC, UA 0   Bacteria None   Hyaline Casts, UA 0              Microbiology Results (last 7 days)       ** No results found for the last 168 hours. 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 HTN nephrosclerosis  Acidosis  Hypokalemia  Uremia  Fluid overload  CHF  No NSAIDs, ACEI/ARB, IV contrast or other nephrotoxins.  Keep MAP > 60, SBP > 100.  Dose meds for GFR < 30 ml/min.  Relax diet, added oral KCL.  UA is bland with proteinuria.   Renal US is unremarkable.  No emergent dialysis needs.  diagnostic and prognostic renal biopsy discussed by Dr. Madrid with patient per his note. Postpone until BP is better controlled.    Anemia of CKD  Hgb and HCT are acceptable. Monitor for now.  Will provide LOR and/or IV iron PRN.    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 CKD-MBD vs reactive) and vitamin D levels are low.   Cont oral D3 and Ca supplement.  Cont phos binders.    HTN  Fluid overload  BP improved w/medication adjustment/additions  Tolerate asymptomatic HTN up to -160.  Low sodium diet.  Agree with lasix IV, appreciate Cards recommendations.  Added metolazone 10/13    Thank you for allowing us to participate in the care of your patient!   We will follow the patient and provide recommendations as needed.    Patient care time was spent personally by me on the following activities:     Obtaining a history.  Examination of patient.  Providing medical care at the patients bedside.  Developing a treatment plan with patient or surrogate and bedside caregivers.  Ordering and reviewing laboratory studies, radiographic studies, pulse oximetry.  Ordering and performing treatments and interventions.  Evaluation of patient's response to treatment.  Discussions with consultants while on the unit and immediately available to the patient.  Re-evaluation of the patient's condition.  Documentation in the medical record.     ANGELI Harrison  Nephrology  [address] [facility]  [place] [address]    [phone] - tel  [phone] - fax    10/14/2022

## 2022-10-15 NOTE — PLAN OF CARE
Problem: Physical Therapy  Goal: Physical Therapy Goal  Description: Goals to be met by: 10/31/2022     Patient will increase functional independence with mobility by performin). Supine to sit with Modified Orgas  2). Sit to supine with Modified Orgas  3). Sit to stand transfer with Modified Orgas  4). Gait  x > 100 feet with Modified Orgas.     Outcome: Ongoing, Progressing

## 2022-10-15 NOTE — PROGRESS NOTES
[facility]  Department of Cardiology  Progress Note    PATIENT NAME: João Ham  MRN: [id number]  TODAY'S DATE: 10/15/2022  ADMIT DATE: 10/10/2022    SUBJECTIVE     PRINCIPLE PROBLEM: Acute renal failure    INTERVAL HISTORY:    10/15/2022  He is doing better.  Blood pressure better controlled on current medications.  He denies any chest pains his breathing is doing better.  His leg edema has improved      10/14/2022   BLOOD PRESSURE STILL ELEVATED CARVEDILOL STARTED    HPI: 42M with little previous follow-up but a PMHx of treated HTN presents to the ED with progressive leg swelling, scrotal swelling, abdominal distention, and SOB over approximately 2 weeks with 1 episode of racing heart. Patient reports he has a good walk from his vehicle to his office at work and notes he had to stop to catch his breath. He denies prior similar episodes. He stopped his Amlodipine, Valsartin and HCTZ a week ago because he thought it was contributing to his swelling  Review of patient's allergies indicates:  No Known Allergies    REVIEW OF SYSTEMS  CARDIOVASCULAR: No recent chest pain, palpitations, arm, neck, or jaw pain  RESPIRATORY: No recent fever, cough chills, SOB or congestion  : No blood in the urine  GI: No Nausea, vomiting, constipation, diarrhea, blood, or reflux.  MUSCULOSKELETAL: No myalgias  NEURO: No lightheadedness or dizziness  EYES: No Double vision, blurry, vision or headache     OBJECTIVE     VITAL SIGNS (Most Recent)  Temp: 97 °F (36.1 °C) (10/15/22 1156)  Pulse: 76 (10/15/22 1156)  Resp: 16 (10/15/22 1156)  BP: 131/86 (10/15/22 1156)  SpO2: 99 % (10/15/22 1156)    VENTILATION STATUS  Resp: 16 (10/15/22 1156)  SpO2: 99 % (10/15/22 1156)       I & O (Last 24H):  Intake/Output Summary (Last 24 hours) at 10/15/2022 1424  Last data filed at 10/15/2022 0824  Gross per 24 hour   Intake --   Output 2075 ml   Net -2075 ml         WEIGHTS  Wt Readings from Last 3 Encounters:   10/11/22 0902 (!) 140.6 kg  (310 lb)   10/10/22 2015 (!) 140.7 kg (310 lb 3 oz)   10/10/22 1340 117.9 kg (260 lb)       PHYSICAL EXAM  CONSTITUTIONAL: Well built, well nourished in no apparent distress  NECK: no carotid bruit, no JVD  LUNGS: CTA  CHEST WALL: no tenderness  HEART: regular rate and rhythm, S1, S2 normal, no murmur, click, rub or gallop   ABDOMEN: soft, non-tender; bowel sounds normal; no masses,  no organomegaly  EXTREMITIES: Extremities normal, 1+ edema  NEURO: AAO X 3    SCHEDULED MEDS:   amLODIPine  10 mg Oral Daily    calcitRIOL  0.25 mcg Oral Daily    calcium carbonate  1,000 mg Oral TID WM    carvediloL  25 mg Oral BID    cholecalciferol (vitamin D3)  5,000 Units Oral Daily    cloNIDine  0.1 mg Oral BID    furosemide (LASIX) injection  60 mg Intravenous Q12H    heparin (porcine)  5,000 Units Subcutaneous Q8H    hydrALAZINE  100 mg Oral Q8H    isosorbide mononitrate  120 mg Oral Daily    LIDOcaine  1 patch Transdermal Q24H    melatonin  6 mg Oral Nightly    metOLazone  5 mg Oral Daily    potassium chloride  20 mEq Oral BID    sevelamer carbonate  800 mg Oral TID WM       CONTINUOUS INFUSIONS:    PRN MEDS:acetaminophen, hydrALAZINE, labetaloL, naloxone, sodium chloride 0.9%    LABS AND DIAGNOSTICS     CBC LAST 3 DAYS  Recent Labs   Lab 10/13/22  0454 10/14/22  0453 10/15/22  0512   WBC 8.41 7.14 6.14   RBC 3.30* 3.31* 3.08*   HGB 9.3* 9.5* 8.6*   HCT 29.1* 29.2* 26.9*   MCV 88 88 87   MCH 28.2 28.7 27.9   MCHC 32.0 32.5 32.0   RDW 16.2* 16.3* 16.1*    305 293   MPV 9.9 10.6 10.7   GRAN 72.6  6.1 68.6  4.9 64.1  3.9   LYMPH 9.3*  0.8* 12.3*  0.9* 15.6*  1.0   MONO 15.1*  1.3* 16.1*  1.2* 16.9*  1.0   BASO 0.03 0.02 0.02   NRBC 0 0 0         COAGULATION LAST 3 DAYS  Recent Labs   Lab 10/10/22  2005   INR 1.1   APTT 24.5         CHEMISTRY LAST 3 DAYS  Recent Labs   Lab 10/13/22  0454 10/14/22  0453 10/15/22  0512    140 136   K 3.9 3.9 3.9    99 96   CO2 24 26 29   ANIONGAP 13 15 11   BUN 66* 65* 67*    CREATININE 6.5* 6.5* 6.7*    96 97   CALCIUM 7.1* 7.7* 7.8*   MG 1.8 1.8 1.8   ALBUMIN 2.6* 2.7* 2.5*   PROT 5.8* 6.1 5.8*   ALKPHOS 80 77 69   ALT 48* 38 40   AST 9* 12 20   BILITOT 0.5 0.5 0.4         CARDIAC PROFILE LAST 3 DAYS  Recent Labs   Lab 10/10/22  1452 10/10/22  1738 10/10/22  2005 10/11/22  0044 10/13/22  1227   BNP 3,678*  --   --   --   --    TROPONINI 0.476*   < > 0.472* 0.427* 0.289*    < > = values in this interval not displayed.         ENDOCRINE LAST 3 DAYS  Recent Labs   Lab 10/10/22  2005   TSH 1.699         LAST ARTERIAL BLOOD GAS  ABG  No results for input(s): PH, PO2, PCO2, HCO3, BE in the last 168 hours.    LAST 7 DAYS MICROBIOLOGY   Microbiology Results (last 7 days)       ** No results found for the last 168 hours. **            MOST RECENT IMAGING  Echo  · The estimated PA systolic pressure is 48 mmHg.  · The left ventricle is normal in size with mild concentric hypertrophy   and normal systolic function.  · Grade III left ventricular diastolic dysfunction.  · Moderate right ventricular enlargement with mildly to moderately reduced   right ventricular systolic function.  · Severe left atrial enlargement.  · There is moderate pulmonary hypertension.  · Intermediate central venous pressure (8 mmHg).  · Mild-to-moderate mitral regurgitation.  · Moderate to severe tricuspid regurgitation.  · Moderate right atrial enlargement.  · The estimated ejection fraction is 60%.  · Atrial fibrillation not observed.         [facility]  Results for orders placed during the hospital encounter of 10/10/22    Echo    Interpretation Summary  · The estimated PA systolic pressure is 48 mmHg.  · The left ventricle is normal in size with mild concentric hypertrophy and normal systolic function.  · Grade III left ventricular diastolic dysfunction.  · Moderate right ventricular enlargement with mildly to moderately reduced right ventricular systolic function.  · Severe left atrial enlargement.  · There is  moderate pulmonary hypertension.  · Intermediate central venous pressure (8 mmHg).  · Mild-to-moderate mitral regurgitation.  · Moderate to severe tricuspid regurgitation.  · Moderate right atrial enlargement.  · The estimated ejection fraction is 60%.  · Atrial fibrillation not observed.      CURRENT/PREVIOUS VISIT EKG  Results for orders placed or performed during the hospital encounter of 10/10/22   EKG 12-lead    Collection Time: 10/13/22 11:32 AM    Narrative    Test Reason : R94.31,    Vent. Rate : 079 BPM     Atrial Rate : 079 BPM     P-R Int : 136 ms          QRS Dur : 096 ms      QT Int : 470 ms       P-R-T Axes : 061 -62 042 degrees     QTc Int : 538 ms    Normal sinus rhythm  Possible Left atrial enlargement  Incomplete right bundle branch block  Left anterior fascicular block  LVH  Cannot rule out Septal infarct ,age undetermined  T wave abnormality, consider anterolateral ischemia  Prolonged QT  Abnormal ECG  When compared with ECG of 10-OCT-2022 13:54,  Incomplete right bundle branch block is now Present  Minimal criteria for Septal infarct are now Present    Referred By: [email or web address]   SELF           Confirmed By:        ASSESSMENT/PLAN:     Active Hospital Problems    Diagnosis    *Acute renal failure    Secondary hypertension    Anemia    Hypocalcemia    Hypoalbuminemia    Obesity (BMI 30-39.9)    Elevated troponin    Acute heart failure with preserved ejection fraction       ASSESSMENT & PLAN:   MALIGNANT HYPERTENSION SECONDARY TO POOR MEDICAL COMPLIANCE.  HE WAS PRESCRIBED VALSARTAN AND AMLODIPINE WITH A DIURETIC PRIOR TO ADMISSION    CONGESTIVE HEART FAILURE PRESERVED EJECTION FRACTION  LEFT VENTRICULAR HYPERTROPHY  DIASTOLIC DYSFUNCTION TYPE 3  PULMONARY HYPERTENSION    RECOMMENDATIONS:  Agree with current medical therapy.    Baldo Wolff MD  [facility]  Department of Cardiology  Date of Service: 10/15/2022  10:01 AM

## 2022-10-15 NOTE — PROGRESS NOTES
[facility] Hospital Medicine  Progress Note    Patient Name: João Ham  MRN: [id number]  Patient Class: IP- Inpatient   Admission Date: 10/10/2022  Length of Stay: 5 days  Attending Physician: Jono Knowles MD  Primary Care Provider: Primary Doctor No        Subjective:     Principal Problem:Acute renal failure        HPI:  João Ham is a 42 year old male with a past medical history of HTN, obesity, and anemia who presents with multiple weeks of worsening swelling to the legs and scrotum associated with worsening shortness of breath. The patient thought that the swelling possibly was secondary to his blood pressure medication, so he stopped taking these medications multiple days ago. He denies any chest pain or palpitations. He denies any drug, tobacco or alcohol use. He has never experienced swelling of this severity before. He denies any issues with urination. He does endorse fatigue, changes in appetite and nausea and vomiting. While in the ED, the patient was given IV calcium, Dilaudid, and labetalol. A Cardene infusion was also started. Hospital Medicine was consulted for admission.      Overview/Hospital Course:  João Ham is a 42 year old male with a past medical history of HTN, obesity, and anemia who presents with multiple weeks of worsening swelling to the legs and scrotum associated with worsening shortness of breath secondary to hypertensive emergency with acute renal failure, demand ischemia and pulmonary edema. He was started on a Cardene infusion for BP control. He was also started on IV Lasix and metolazone diuresis given volume overload. Nephrology and Cardiology have been consulted. A TTE shows grade III diastolic dysfunction. The patient is also hypocalcemic (likely from renal failure); calcium is being repleted as well as vitamin D. Nephrology is considering kidney biopsy at this time. He has been weaned from Cardene and is now on hydralazine 100 TID, Imdur 120, Coreg  "25 BID, clonidine 0.1 BID and amlodipine 10. PO blood pressure medications are currently being titrated for BP control to allow for kidney biopsy. BP has been well controlled as of 10/15/2022.      Interval History: see "Hospital Course"    Review of Systems   Constitutional:  Positive for activity change and fatigue. Negative for appetite change and fever.   HENT: Negative.     Eyes: Negative.    Respiratory:  Negative for shortness of breath.    Cardiovascular:  Positive for leg swelling.   Gastrointestinal:  Negative for abdominal distention, abdominal pain, constipation, diarrhea, nausea and vomiting.   Endocrine: Negative.    Genitourinary:  Positive for scrotal swelling. Negative for difficulty urinating, flank pain and frequency.   Musculoskeletal: Negative.    Skin: Negative.    Allergic/Immunologic: Negative.    Neurological: Negative.    Hematological: Negative.    Psychiatric/Behavioral:  Negative for decreased concentration.    Objective:     Vital Signs (Most Recent):  Temp: 98.1 °F (36.7 °C) (10/15/22 0817)  Pulse: 77 (10/15/22 0817)  Resp: 17 (10/15/22 0817)  BP: 130/81 (10/15/22 0817)  SpO2: 100 % (10/15/22 0817) Vital Signs (24h Range):  Temp:  [97.4 °F (36.3 °C)-98.8 °F (37.1 °C)] 98.1 °F (36.7 °C)  Pulse:  [73-83] 77  Resp:  [16-19] 17  SpO2:  [96 %-100 %] 100 %  BP: (115-142)/(68-81) 130/81     Weight: (!) 140.6 kg (310 lb)  Body mass index is 39.8 kg/m².    Intake/Output Summary (Last 24 hours) at 10/15/2022 0859  Last data filed at 10/15/2022 0824  Gross per 24 hour   Intake --   Output 2075 ml   Net -2075 ml      Physical Exam  Vitals and nursing note reviewed.   Constitutional:       General: He is not in acute distress.     Appearance: He is obese. He is ill-appearing.      Comments: Anasarca.   HENT:      Head: Normocephalic and atraumatic.      Right Ear: External ear normal.      Left Ear: External ear normal.      Nose: Nose normal.      Mouth/Throat:      Mouth: Mucous membranes are " moist.      Pharynx: Oropharynx is clear.   Eyes:      Extraocular Movements: Extraocular movements intact.      Conjunctiva/sclera: Conjunctivae normal.   Cardiovascular:      Rate and Rhythm: Normal rate and regular rhythm.      Pulses: Normal pulses.      Heart sounds: Normal heart sounds.   Pulmonary:      Effort: Pulmonary effort is normal. No respiratory distress.      Breath sounds: No rales.   Abdominal:      General: Bowel sounds are normal. There is no distension.      Palpations: Abdomen is soft.      Tenderness: There is no abdominal tenderness. There is no right CVA tenderness or left CVA tenderness.   Genitourinary:     Comments: Scrotal swelling.  Musculoskeletal:         General: Normal range of motion.      Cervical back: Normal range of motion and neck supple.      Right lower leg: Edema present.      Left lower leg: Edema present.   Skin:     General: Skin is warm and dry.   Neurological:      General: No focal deficit present.      Mental Status: He is alert and oriented to person, place, and time. Mental status is at baseline.   Psychiatric:         Mood and Affect: Mood normal.         Behavior: Behavior normal.       Significant Labs: All pertinent labs within the past 24 hours have been reviewed.    Significant Imaging: I have reviewed all pertinent imaging results/findings within the past 24 hours.      Assessment/Plan:      * Acute renal failure  Renal ultrasound shows no obstructive disease. Urine sodium 91.   -Nephrology consulted  -Monitor UOP and electrolytes  -IV Lasix diuresis with metolazone  -BP control  -Trend creatinine  -Renally dose medications  -Avoid nephrotoxic agents; hold home ARB and thiazide diuretic  -Nephrology considering possible kidney biopsy; continue to control BP      Secondary hypertension  -Hydralazine 100 Q8H  -Imdur 120  -Coreg 25 BID  -Amlodipine 10  -Clonidine 0.1 BID  -PRN IV hydralazine and labetalol  -Continue IV Lasix diuresis with  metolazone  -Nephrology consulted for GORGE  -Cardiology consulted for elevated troponin  -Telemetry  -Start secondary HTN workup with renin/aldosterone ratio given hypokalemia on admission    Acute heart failure with preserved ejection fraction  TTE shows grade III diastolic dysfunction. Improving.  -IV Lasix diuresis with metolazone  -Cardiology consulted  -Hydralazine and Imdur  -Coreg 25 BID  -Telemetry  -Strict I's and O's  -Keep K > 4 and Mg > 2; caution with repletion given kidney failure    Elevated troponin  In setting of HFpEF exacerbation and kidney failure. Improving.  -Telemetry  -Cardiology consulted  -BP control   -Continue diuresis    Obesity (BMI 30-39.9)  Body mass index is 39.8 kg/m². Morbid obesity complicates all aspects of disease management from diagnostic modalities to treatment.      Hypoalbuminemia  -Trend albumin with CMP      Hypocalcemia  PTH elevated. Low vitamin D.  -Trend ionized calcium  -Scheduled calcium and vitamin D repletion  -Replete PRN  -Nephrology consulted      Anemia  Iron level low and ferritin at low limit of normal. Patient may benefit from iron repletion.  -Trend Hgb with CBC  -Iron repletion per Nephrology        VTE Risk Mitigation (From admission, onward)         Ordered     heparin (porcine) injection 5,000 Units  Every 8 hours         10/10/22 1955     IP VTE HIGH RISK PATIENT  Once         10/10/22 1955     Place sequential compression device  Until discontinued         10/10/22 1955                Discharge Planning   MARIA ISABEL: 10/18/2022     Code Status: Full Code   Is the patient medically ready for discharge?:     Reason for patient still in hospital (select all that apply): Patient trending condition, Laboratory test, Treatment and Consult recommendations  Discharge Plan A: Home with family                  Jono Knowles MD  Department of Hospital Medicine   [facility]

## 2022-10-15 NOTE — CARE UPDATE
10/15/22 0726   Patient Assessment/Suction   Level of Consciousness (AVPU) alert   PRE-TX-O2   O2 Device (Oxygen Therapy) nasal cannula   $ Is the patient on Low Flow Oxygen? Yes   Flow (L/min) 1   SpO2 100 %   Pulse Oximetry Type Intermittent   $ Pulse Oximetry - Multiple Charge Pulse Oximetry - Multiple   Pulse 76   Resp 17

## 2022-10-16 LAB
ALBUMIN SERPL BCP-MCNC: 2.6 G/DL (ref 3.5–5.2)
ALP SERPL-CCNC: 67 U/L (ref 55–135)
ALT SERPL W/O P-5'-P-CCNC: 37 U/L (ref 10–44)
ANION GAP SERPL CALC-SCNC: 14 MMOL/L (ref 8–16)
AST SERPL-CCNC: 15 U/L (ref 10–40)
BASOPHILS # BLD AUTO: 0.02 K/UL (ref 0–0.2)
BASOPHILS NFR BLD: 0.3 % (ref 0–1.9)
BILIRUB SERPL-MCNC: 0.3 MG/DL (ref 0.1–1)
BUN SERPL-MCNC: 70 MG/DL (ref 6–20)
CA-I BLDV-SCNC: 0.99 MMOL/L (ref 1.06–1.42)
CALCIUM SERPL-MCNC: 7.9 MG/DL (ref 8.7–10.5)
CHLORIDE SERPL-SCNC: 94 MMOL/L (ref 95–110)
CO2 SERPL-SCNC: 30 MMOL/L (ref 23–29)
CREAT SERPL-MCNC: 6.6 MG/DL (ref 0.5–1.4)
DIFFERENTIAL METHOD: ABNORMAL
EOSINOPHIL # BLD AUTO: 0.2 K/UL (ref 0–0.5)
EOSINOPHIL NFR BLD: 2.8 % (ref 0–8)
ERYTHROCYTE [DISTWIDTH] IN BLOOD BY AUTOMATED COUNT: 16 % (ref 11.5–14.5)
EST. GFR  (NO RACE VARIABLE): 10 ML/MIN/1.73 M^2
GLUCOSE SERPL-MCNC: 92 MG/DL (ref 70–110)
HCT VFR BLD AUTO: 28.1 % (ref 40–54)
HGB BLD-MCNC: 8.9 G/DL (ref 14–18)
IMM GRANULOCYTES # BLD AUTO: 0.02 K/UL (ref 0–0.04)
IMM GRANULOCYTES NFR BLD AUTO: 0.3 % (ref 0–0.5)
LYMPHOCYTES # BLD AUTO: 1.3 K/UL (ref 1–4.8)
LYMPHOCYTES NFR BLD: 21.6 % (ref 18–48)
MAGNESIUM SERPL-MCNC: 1.9 MG/DL (ref 1.6–2.6)
MCH RBC QN AUTO: 27.9 PG (ref 27–31)
MCHC RBC AUTO-ENTMCNC: 31.7 G/DL (ref 32–36)
MCV RBC AUTO: 88 FL (ref 82–98)
MONOCYTES # BLD AUTO: 1 K/UL (ref 0.3–1)
MONOCYTES NFR BLD: 17.3 % (ref 4–15)
NEUTROPHILS # BLD AUTO: 3.3 K/UL (ref 1.8–7.7)
NEUTROPHILS NFR BLD: 57.7 % (ref 38–73)
NRBC BLD-RTO: 0 /100 WBC
PHOSPHATE SERPL-MCNC: 6.1 MG/DL (ref 2.7–4.5)
PLATELET # BLD AUTO: 318 K/UL (ref 150–450)
PMV BLD AUTO: 10.1 FL (ref 9.2–12.9)
POTASSIUM SERPL-SCNC: 3.9 MMOL/L (ref 3.5–5.1)
PROT SERPL-MCNC: 6.1 G/DL (ref 6–8.4)
RBC # BLD AUTO: 3.19 M/UL (ref 4.6–6.2)
SODIUM SERPL-SCNC: 138 MMOL/L (ref 136–145)
WBC # BLD AUTO: 5.79 K/UL (ref 3.9–12.7)

## 2022-10-16 PROCEDURE — 12000002 HC ACUTE/MED SURGE SEMI-PRIVATE ROOM

## 2022-10-16 PROCEDURE — 27000221 HC OXYGEN, UP TO 24 HOURS

## 2022-10-16 PROCEDURE — 82330 ASSAY OF CALCIUM: CPT | Performed by: STUDENT IN AN ORGANIZED HEALTH CARE EDUCATION/TRAINING PROGRAM

## 2022-10-16 PROCEDURE — 84100 ASSAY OF PHOSPHORUS: CPT | Performed by: STUDENT IN AN ORGANIZED HEALTH CARE EDUCATION/TRAINING PROGRAM

## 2022-10-16 PROCEDURE — 25000003 PHARM REV CODE 250: Performed by: NURSE PRACTITIONER

## 2022-10-16 PROCEDURE — 63600175 PHARM REV CODE 636 W HCPCS: Performed by: INTERNAL MEDICINE

## 2022-10-16 PROCEDURE — 83735 ASSAY OF MAGNESIUM: CPT | Performed by: STUDENT IN AN ORGANIZED HEALTH CARE EDUCATION/TRAINING PROGRAM

## 2022-10-16 PROCEDURE — 25000003 PHARM REV CODE 250: Performed by: INTERNAL MEDICINE

## 2022-10-16 PROCEDURE — 80053 COMPREHEN METABOLIC PANEL: CPT | Performed by: STUDENT IN AN ORGANIZED HEALTH CARE EDUCATION/TRAINING PROGRAM

## 2022-10-16 PROCEDURE — 94761 N-INVAS EAR/PLS OXIMETRY MLT: CPT

## 2022-10-16 PROCEDURE — 25000003 PHARM REV CODE 250: Performed by: STUDENT IN AN ORGANIZED HEALTH CARE EDUCATION/TRAINING PROGRAM

## 2022-10-16 PROCEDURE — 63600175 PHARM REV CODE 636 W HCPCS: Performed by: STUDENT IN AN ORGANIZED HEALTH CARE EDUCATION/TRAINING PROGRAM

## 2022-10-16 PROCEDURE — 84244 ASSAY OF RENIN: CPT | Performed by: STUDENT IN AN ORGANIZED HEALTH CARE EDUCATION/TRAINING PROGRAM

## 2022-10-16 PROCEDURE — 36415 COLL VENOUS BLD VENIPUNCTURE: CPT | Performed by: STUDENT IN AN ORGANIZED HEALTH CARE EDUCATION/TRAINING PROGRAM

## 2022-10-16 PROCEDURE — 99232 SBSQ HOSP IP/OBS MODERATE 35: CPT | Mod: ,,, | Performed by: INTERNAL MEDICINE

## 2022-10-16 PROCEDURE — 99232 PR SUBSEQUENT HOSPITAL CARE,LEVL II: ICD-10-PCS | Mod: ,,, | Performed by: INTERNAL MEDICINE

## 2022-10-16 PROCEDURE — 85025 COMPLETE CBC W/AUTO DIFF WBC: CPT | Performed by: STUDENT IN AN ORGANIZED HEALTH CARE EDUCATION/TRAINING PROGRAM

## 2022-10-16 RX ORDER — SEVELAMER CARBONATE 800 MG/1
1600 TABLET, FILM COATED ORAL
Status: DISCONTINUED | OUTPATIENT
Start: 2022-10-16 | End: 2022-10-18 | Stop reason: HOSPADM

## 2022-10-16 RX ADMIN — ISOSORBIDE MONONITRATE 120 MG: 60 TABLET, EXTENDED RELEASE ORAL at 09:10

## 2022-10-16 RX ADMIN — CALCIUM CARBONATE (ANTACID) CHEW TAB 500 MG 1000 MG: 500 CHEW TAB at 09:10

## 2022-10-16 RX ADMIN — HEPARIN SODIUM 5000 UNITS: 5000 INJECTION INTRAVENOUS; SUBCUTANEOUS at 10:10

## 2022-10-16 RX ADMIN — FUROSEMIDE 60 MG: 10 INJECTION, SOLUTION INTRAMUSCULAR; INTRAVENOUS at 05:10

## 2022-10-16 RX ADMIN — CALCITRIOL CAPSULES 0.25 MCG 0.25 MCG: 0.25 CAPSULE ORAL at 09:10

## 2022-10-16 RX ADMIN — MELATONIN TAB 3 MG 6 MG: 3 TAB at 10:10

## 2022-10-16 RX ADMIN — HEPARIN SODIUM 5000 UNITS: 5000 INJECTION INTRAVENOUS; SUBCUTANEOUS at 02:10

## 2022-10-16 RX ADMIN — HYDRALAZINE HYDROCHLORIDE 100 MG: 25 TABLET, FILM COATED ORAL at 10:10

## 2022-10-16 RX ADMIN — SEVELAMER CARBONATE 800 MG: 800 TABLET, FILM COATED ORAL at 09:10

## 2022-10-16 RX ADMIN — HYDRALAZINE HYDROCHLORIDE 100 MG: 25 TABLET, FILM COATED ORAL at 05:10

## 2022-10-16 RX ADMIN — CLONIDINE HYDROCHLORIDE 0.1 MG: 0.1 TABLET ORAL at 09:10

## 2022-10-16 RX ADMIN — CLONIDINE HYDROCHLORIDE 0.1 MG: 0.1 TABLET ORAL at 10:10

## 2022-10-16 RX ADMIN — CALCIUM CARBONATE (ANTACID) CHEW TAB 500 MG 1000 MG: 500 CHEW TAB at 04:10

## 2022-10-16 RX ADMIN — CHOLECALCIFEROL TAB 125 MCG (5000 UNIT) 5000 UNITS: 125 TAB at 09:10

## 2022-10-16 RX ADMIN — SEVELAMER CARBONATE 1600 MG: 800 TABLET, FILM COATED ORAL at 11:10

## 2022-10-16 RX ADMIN — SEVELAMER CARBONATE 1600 MG: 800 TABLET, FILM COATED ORAL at 04:10

## 2022-10-16 RX ADMIN — HEPARIN SODIUM 5000 UNITS: 5000 INJECTION INTRAVENOUS; SUBCUTANEOUS at 05:10

## 2022-10-16 RX ADMIN — POTASSIUM CHLORIDE 20 MEQ: 1500 TABLET, EXTENDED RELEASE ORAL at 09:10

## 2022-10-16 RX ADMIN — CALCIUM CARBONATE (ANTACID) CHEW TAB 500 MG 1000 MG: 500 CHEW TAB at 11:10

## 2022-10-16 RX ADMIN — AMLODIPINE BESYLATE 10 MG: 5 TABLET ORAL at 09:10

## 2022-10-16 RX ADMIN — CARVEDILOL 25 MG: 25 TABLET, FILM COATED ORAL at 10:10

## 2022-10-16 RX ADMIN — POTASSIUM CHLORIDE 20 MEQ: 1500 TABLET, EXTENDED RELEASE ORAL at 10:10

## 2022-10-16 RX ADMIN — METOLAZONE 5 MG: 2.5 TABLET ORAL at 09:10

## 2022-10-16 RX ADMIN — CARVEDILOL 25 MG: 25 TABLET, FILM COATED ORAL at 09:10

## 2022-10-16 RX ADMIN — HYDRALAZINE HYDROCHLORIDE 100 MG: 25 TABLET, FILM COATED ORAL at 02:10

## 2022-10-16 NOTE — ASSESSMENT & PLAN NOTE
PTH elevated. Low vitamin D.  -Trend ionized calcium  -Scheduled calcium and vitamin D repletion  -Replete PRN  -Nephrology consulted  -Calcitriol

## 2022-10-16 NOTE — CARE UPDATE
10/15/22 2050   Patient Assessment/Suction   Level of Consciousness (AVPU) alert   Respiratory Effort Unlabored   PRE-TX-O2   O2 Device (Oxygen Therapy) room air   SpO2 96 %   Pulse Oximetry Type Intermittent   $ Pulse Oximetry - Multiple Charge Pulse Oximetry - Multiple   Pulse 77   Resp 18

## 2022-10-16 NOTE — PROGRESS NOTES
Nephrology Progress Note        Patient Name: João Ham  MRN: [id number]    Patient Class: IP- Inpatient   Admission Date: 10/10/2022  Length of Stay: 6 days  Date of Service: 10/16/2022    Attending Physician: Jono Knowles MD  Primary Care Provider: Primary Doctor No    Reason for Consult: clarke/ckd/uremia/acidosis/chf/htn/anemia    SUBJECTIVE:     HPI: 42M with little previous follow-up but a PMHx of treated HTN presents to the ED with progressive leg swelling, scrotal swelling, abdominal distention, and SOB over approximately 2 weeks with 1 episode of racing heart. Patient reports he has a good walk from his vehicle to his office at work and notes he had to stop to catch his breath. He denies prior similar episodes. He stopped his Amlodipine, Valsartin and HCTZ a week ago because he thought it was contributing to his swelling. Per my discussion with Dr. Fox, agree with cards eval and diuretic due to uncontrolled HTN,as well as renal US and UA. I added Phos, Mg, PTH, am vitamin D levels in AM to evaluate further.    10/11 VSS. Renal US is unremarkable, UA is bland. Vitamin D is low and PTH is high, add oral D3 and oral calcium supplement. Agree with cards recommendations and med changes.    10/12 VSS. Will quantify proteinuria. Etiology of such advanced CKD is unclear. Not sure that there is anything treatable, but a renal biopsy maybe important from prognostic stand point in this young man with possible dialysis and transplantation in the future. Will discuss with patient, hold blood thinners. Relax K restriction, give oral KCL. Continue diuretics.    10/13 VSS, sCr better, BP still high. Continu Lasix, add metolazone. Postpone kidney biopsy, re-evaluate in AM.    10/14 VSS, no new complains, edema better. Re-evaluate in AM. Postpone kidney biopsy for next week or outpatient.  10/15  UOP 1.3L, pressures much improved.  Renal function not improving.  No s/sx uremia, no complaints other than not sleeping well.     10/16  UOP 2.7L, BP controlled.  Renal function same.  PO4 creeping up, calcitriol added yesterday.  Will increase renvela to 2 tabs w/meals.  Complements normal.  No new complaints.    Past Medical History:   Diagnosis Date    Hypertension      No past surgical history on file.  No family history on file.       Review of patient's allergies indicates:  No Known Allergies    Outpatient meds:  No current facility-administered medications on file prior to encounter.     Current Outpatient Medications on File Prior to Encounter   Medication Sig Dispense Refill    amlodipine-valsartan (EXFORGE)  mg per tablet Take 1 tablet by mouth once daily.      hydroCHLOROthiazide (HYDRODIURIL) 25 MG tablet Take 25 mg by mouth once daily.         Scheduled meds:   amLODIPine  10 mg Oral Daily    calcitRIOL  0.25 mcg Oral Daily    calcium carbonate  1,000 mg Oral TID WM    carvediloL  25 mg Oral BID    cholecalciferol (vitamin D3)  5,000 Units Oral Daily    cloNIDine  0.1 mg Oral BID    furosemide (LASIX) injection  60 mg Intravenous Q12H    heparin (porcine)  5,000 Units Subcutaneous Q8H    hydrALAZINE  100 mg Oral Q8H    isosorbide mononitrate  120 mg Oral Daily    LIDOcaine  1 patch Transdermal Q24H    melatonin  6 mg Oral Nightly    metOLazone  5 mg Oral Daily    potassium chloride  20 mEq Oral BID    sevelamer carbonate  800 mg Oral TID WM       Infusions:        PRN meds:      Review of Systems:    OBJECTIVE:     Vital Signs and IO:  Temp:  [97 °F (36.1 °C)-98.6 °F (37 °C)]   Pulse:  [66-78]   Resp:  [16-18]   BP: (123-138)/(66-86)   SpO2:  [95 %-100 %]   I/O last 3 completed shifts:  In: -   Out: 4125 [Urine:4125]  Wt Readings from Last 5 Encounters:   10/11/22 (!) 140.6 kg (310 lb)     Body mass index is 39.8 kg/m².    Physical Exam  Constitutional:       General: He is not in acute distress.     Appearance: He is well-developed. He is not diaphoretic.   HENT:      Head: Normocephalic and atraumatic.       Mouth/Throat:      Mouth: Mucous membranes are moist.   Eyes:      General: No scleral icterus.     Pupils: Pupils are equal, round, and reactive to light.   Cardiovascular:      Rate and Rhythm: Normal rate and regular rhythm.   Pulmonary:      Effort: Pulmonary effort is normal. No respiratory distress.      Breath sounds: No stridor.   Abdominal:      General: There is distension.      Palpations: Abdomen is soft.   Musculoskeletal:         General: Swelling present. No deformity. Normal range of motion.      Cervical back: Neck supple.   Skin:     General: Skin is warm and dry.      Findings: No erythema or rash.   Neurological:      Mental Status: He is alert and oriented to person, place, and time.      Cranial Nerves: No cranial nerve deficit.   Psychiatric:         Behavior: Behavior normal.     Laboratory:  Recent Labs   Lab 10/14/22  0453 10/15/22  0512 10/16/22  0508    136 138   K 3.9 3.9 3.9   CL 99 96 94*   CO2 26 29 30*   BUN 65* 67* 70*   CREATININE 6.5* 6.7* 6.6*   GLU 96 97 92         Recent Labs   Lab 10/14/22  0453 10/15/22  0512 10/16/22  0508   CALCIUM 7.7* 7.8* 7.9*   ALBUMIN 2.7* 2.5* 2.6*   PHOS 5.4* 5.7* 6.1*   MG 1.8 1.8 1.9         Recent Labs   Lab 10/10/22  1452 10/10/22  2005   PTH, Intact 632.4 H 517.7 H         No results for input(s): POCTGLUCOSE in the last 168 hours.    Recent Labs   Lab 10/10/22  2005   Hemoglobin A1C 5.5         Recent Labs   Lab 10/14/22  0453 10/15/22  0512 10/16/22  0508   WBC 7.14 6.14 5.79   HGB 9.5* 8.6* 8.9*   HCT 29.2* 26.9* 28.1*    293 318   MCV 88 87 88   MCHC 32.5 32.0 31.7*   MONO 16.1*  1.2* 16.9*  1.0 17.3*  1.0         Recent Labs   Lab 10/14/22  0453 10/15/22  0512 10/16/22  0508   BILITOT 0.5 0.4 0.3   PROT 6.1 5.8* 6.1   ALBUMIN 2.7* 2.5* 2.6*   ALKPHOS 77 69 67   ALT 38 40 37   AST 12 20 15         Recent Labs   Lab 10/10/22  1645   Color, UA Yellow   Appearance, UA Clear   pH, UA 6.0   Specific Gravity, UA 1.025   Protein, UA  2+ A   Glucose, UA Negative   Ketones, UA Negative   Urobilinogen, UA Negative   Bilirubin (UA) Negative   Occult Blood UA 1+ A   Nitrite, UA Negative   RBC, UA 1   WBC, UA 0   Bacteria None   Hyaline Casts, UA 0               Microbiology Results (last 7 days)       ** No results found for the last 168 hours. 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 HTN nephrosclerosis  Acidosis  Hypokalemia  Uremia  Fluid overload  CHF  No NSAIDs, ACEI/ARB, IV contrast or other nephrotoxins.  Keep MAP > 60, SBP > 100.  Dose meds for GFR < 30 ml/min.  Relax diet, added oral KCL.  UA is bland with proteinuria.   Renal US is unremarkable.  No emergent dialysis needs.  diagnostic and prognostic renal biopsy discussed by Dr. Madrid with patient per his note. Postpone until BP is better controlled.    Anemia of CKD  Hgb and HCT are acceptable. Monitor for now.  Will provide LOR and/or IV iron PRN.    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 CKD-MBD vs reactive) and vitamin D levels are low.   Cont oral D3 and Ca supplement.  Cont phos binders.    HTN  Fluid overload  BP improved w/medication adjustment/additions  Tolerate asymptomatic HTN up to -160.  Low sodium diet.  Agree with lasix IV, appreciate Cards recommendations.  Added metolazone 10/13    Thank you for allowing us to participate in the care of your patient!   We will follow the patient and provide recommendations as needed.    Patient care time was spent personally by me on the following activities:     Obtaining a history.  Examination of patient.  Providing medical care at the patients bedside.  Developing a treatment plan with patient or surrogate and bedside caregivers.  Ordering and reviewing laboratory studies, radiographic studies, pulse oximetry.  Ordering and performing treatments and interventions.  Evaluation of patient's response to  treatment.  Discussions with consultants while on the unit and immediately available to the patient.  Re-evaluation of the patient's condition.  Documentation in the medical record.     Kellie Doty NP      [facility]  [address] [place] [address]  [place] [id number]    [phone] - tel  [phone] - fax    10/16/2022

## 2022-10-16 NOTE — PROGRESS NOTES
[facility]  Department of Cardiology  Progress Note    PATIENT NAME: João Ham  MRN: [id number]  TODAY'S DATE: 10/16/2022  ADMIT DATE: 10/10/2022    SUBJECTIVE     PRINCIPLE PROBLEM: Acute renal failure    INTERVAL HISTORY:    10/16/2022  He is doing better.  The swelling is coming down.  The creatinine is stable    10/15/2022  He is doing better.  Blood pressure better controlled on current medications.  He denies any chest pains his breathing is doing better.  His leg edema has improved      10/14/2022   BLOOD PRESSURE STILL ELEVATED CARVEDILOL STARTED    HPI: 42M with little previous follow-up but a PMHx of treated HTN presents to the ED with progressive leg swelling, scrotal swelling, abdominal distention, and SOB over approximately 2 weeks with 1 episode of racing heart. Patient reports he has a good walk from his vehicle to his office at work and notes he had to stop to catch his breath. He denies prior similar episodes. He stopped his Amlodipine, Valsartin and HCTZ a week ago because he thought it was contributing to his swelling  Review of patient's allergies indicates:  No Known Allergies    REVIEW OF SYSTEMS  CARDIOVASCULAR: No recent chest pain, palpitations, arm, neck, or jaw pain  RESPIRATORY: No recent fever, cough chills, SOB or congestion  : No blood in the urine  GI: No Nausea, vomiting, constipation, diarrhea, blood, or reflux.  MUSCULOSKELETAL: No myalgias  NEURO: No lightheadedness or dizziness  EYES: No Double vision, blurry, vision or headache     OBJECTIVE     VITAL SIGNS (Most Recent)  Temp: 96.5 °F (35.8 °C) (10/16/22 1159)  Pulse: 72 (10/16/22 1159)  Resp: 16 (10/16/22 1159)  BP: 124/67 (10/16/22 1159)  SpO2: (!) 94 % (10/16/22 1159)    VENTILATION STATUS  Resp: 16 (10/16/22 1159)  SpO2: (!) 94 % (10/16/22 1159)  Oxygen Concentration (%):  [28] 28    I & O (Last 24H):  Intake/Output Summary (Last 24 hours) at 10/16/2022 1311  Last data filed at 10/16/2022 0839  Gross per 24  hour   Intake 240 ml   Output 3075 ml   Net -2835 ml         WEIGHTS  Wt Readings from Last 3 Encounters:   10/11/22 0902 (!) 140.6 kg (310 lb)   10/10/22 2015 (!) 140.7 kg (310 lb 3 oz)   10/10/22 1340 117.9 kg (260 lb)       PHYSICAL EXAM  CONSTITUTIONAL: Well built, well nourished in no apparent distress  NECK: no carotid bruit, no JVD  LUNGS: CTA  CHEST WALL: no tenderness  HEART: regular rate and rhythm, S1, S2 normal, no murmur, click, rub or gallop   ABDOMEN: soft, non-tender; bowel sounds normal; no masses,  no organomegaly  EXTREMITIES: Extremities normal, 1+ edema  NEURO: AAO X 3    SCHEDULED MEDS:   amLODIPine  10 mg Oral Daily    calcitRIOL  0.25 mcg Oral Daily    calcium carbonate  1,000 mg Oral TID WM    carvediloL  25 mg Oral BID    cholecalciferol (vitamin D3)  5,000 Units Oral Daily    cloNIDine  0.1 mg Oral BID    furosemide (LASIX) injection  60 mg Intravenous Q12H    heparin (porcine)  5,000 Units Subcutaneous Q8H    hydrALAZINE  100 mg Oral Q8H    isosorbide mononitrate  120 mg Oral Daily    LIDOcaine  1 patch Transdermal Q24H    melatonin  6 mg Oral Nightly    metOLazone  5 mg Oral Daily    potassium chloride  20 mEq Oral BID    sevelamer carbonate  1,600 mg Oral TID WM       CONTINUOUS INFUSIONS:    PRN MEDS:acetaminophen, hydrALAZINE, labetaloL, naloxone, sodium chloride 0.9%    LABS AND DIAGNOSTICS     CBC LAST 3 DAYS  Recent Labs   Lab 10/14/22  0453 10/15/22  0512 10/16/22  0508   WBC 7.14 6.14 5.79   RBC 3.31* 3.08* 3.19*   HGB 9.5* 8.6* 8.9*   HCT 29.2* 26.9* 28.1*   MCV 88 87 88   MCH 28.7 27.9 27.9   MCHC 32.5 32.0 31.7*   RDW 16.3* 16.1* 16.0*    293 318   MPV 10.6 10.7 10.1   GRAN 68.6  4.9 64.1  3.9 57.7  3.3   LYMPH 12.3*  0.9* 15.6*  1.0 21.6  1.3   MONO 16.1*  1.2* 16.9*  1.0 17.3*  1.0   BASO 0.02 0.02 0.02   NRBC 0 0 0         COAGULATION LAST 3 DAYS  Recent Labs   Lab 10/10/22  2005   INR 1.1   APTT 24.5         CHEMISTRY LAST 3 DAYS  Recent Labs   Lab  10/14/22  0453 10/15/22  0512 10/16/22  0508    136 138   K 3.9 3.9 3.9   CL 99 96 94*   CO2 26 29 30*   ANIONGAP 15 11 14   BUN 65* 67* 70*   CREATININE 6.5* 6.7* 6.6*   GLU 96 97 92   CALCIUM 7.7* 7.8* 7.9*   MG 1.8 1.8 1.9   ALBUMIN 2.7* 2.5* 2.6*   PROT 6.1 5.8* 6.1   ALKPHOS 77 69 67   ALT 38 40 37   AST 12 20 15   BILITOT 0.5 0.4 0.3         CARDIAC PROFILE LAST 3 DAYS  Recent Labs   Lab 10/10/22  1452 10/10/22  1738 10/10/22  2005 10/11/22  0044 10/13/22  1227   BNP 3,678*  --   --   --   --    TROPONINI 0.476*   < > 0.472* 0.427* 0.289*    < > = values in this interval not displayed.         ENDOCRINE LAST 3 DAYS  Recent Labs   Lab 10/10/22  2005   TSH 1.699         LAST ARTERIAL BLOOD GAS  ABG  No results for input(s): PH, PO2, PCO2, HCO3, BE in the last 168 hours.    LAST 7 DAYS MICROBIOLOGY   Microbiology Results (last 7 days)       ** No results found for the last 168 hours. **            MOST RECENT IMAGING  Echo  · The estimated PA systolic pressure is 48 mmHg.  · The left ventricle is normal in size with mild concentric hypertrophy   and normal systolic function.  · Grade III left ventricular diastolic dysfunction.  · Moderate right ventricular enlargement with mildly to moderately reduced   right ventricular systolic function.  · Severe left atrial enlargement.  · There is moderate pulmonary hypertension.  · Intermediate central venous pressure (8 mmHg).  · Mild-to-moderate mitral regurgitation.  · Moderate to severe tricuspid regurgitation.  · Moderate right atrial enlargement.  · The estimated ejection fraction is 60%.  · Atrial fibrillation not observed.         [facility]  Results for orders placed during the hospital encounter of 10/10/22    Echo    Interpretation Summary  · The estimated PA systolic pressure is 48 mmHg.  · The left ventricle is normal in size with mild concentric hypertrophy and normal systolic function.  · Grade III left ventricular diastolic dysfunction.  · Moderate right  ventricular enlargement with mildly to moderately reduced right ventricular systolic function.  · Severe left atrial enlargement.  · There is moderate pulmonary hypertension.  · Intermediate central venous pressure (8 mmHg).  · Mild-to-moderate mitral regurgitation.  · Moderate to severe tricuspid regurgitation.  · Moderate right atrial enlargement.  · The estimated ejection fraction is 60%.  · Atrial fibrillation not observed.      CURRENT/PREVIOUS VISIT EKG  Results for orders placed or performed during the hospital encounter of 10/10/22   EKG 12-lead    Collection Time: 10/13/22 11:32 AM    Narrative    Test Reason : R94.31,    Vent. Rate : 079 BPM     Atrial Rate : 079 BPM     P-R Int : 136 ms          QRS Dur : 096 ms      QT Int : 470 ms       P-R-T Axes : 061 -62 042 degrees     QTc Int : 538 ms    Normal sinus rhythm  Possible Left atrial enlargement  Incomplete right bundle branch block  Left anterior fascicular block  LVH  Cannot rule out Septal infarct ,age undetermined  T wave abnormality, consider anterolateral ischemia  Prolonged QT  Abnormal ECG  When compared with ECG of 10-OCT-2022 13:54,  Incomplete right bundle branch block is now Present  Minimal criteria for Septal infarct are now Present    Referred By: [email or web address]   SELF           Confirmed By:        ASSESSMENT/PLAN:     Active Hospital Problems    Diagnosis    *Acute renal failure    Secondary hypertension    Anemia    Hypocalcemia    Hypoalbuminemia    Obesity (BMI 30-39.9)    Elevated troponin    Acute heart failure with preserved ejection fraction       ASSESSMENT & PLAN:   MALIGNANT HYPERTENSION SECONDARY TO POOR MEDICAL COMPLIANCE.  HE WAS PRESCRIBED VALSARTAN AND AMLODIPINE WITH A DIURETIC PRIOR TO ADMISSION blood pressure is better controlled.  He has diuresed well    CONGESTIVE HEART FAILURE PRESERVED EJECTION FRACTION    LEFT VENTRICULAR HYPERTROPHY, DIASTOLIC DYSFUNCTION TYPE 3    PULMONARY HYPERTENSION      Acute kidney injury/  chronic kidney disease stage 4 5        RECOMMENDATIONS:  Agree with current medical therapy.    Baldo Wolff MD  [facility]  Department of Cardiology  Date of Service: 10/16/2022  10:01 AM

## 2022-10-16 NOTE — ASSESSMENT & PLAN NOTE
Renal ultrasound shows no obstructive disease. Urine sodium 91.   -Nephrology consulted  -Monitor UOP and electrolytes  -IV Lasix diuresis with metolazone  -BP control  -Trend creatinine  -Renally dose medications  -Avoid nephrotoxic agents; hold home ARB and thiazide diuretic  -Nephrology considering possible kidney biopsy; continue to control BP  -Follow up serologies

## 2022-10-16 NOTE — ASSESSMENT & PLAN NOTE
TTE shows grade III diastolic dysfunction. Improving.  -IV Lasix diuresis with metolazone  -Cardiology consulted  -Hydralazine and Imdur  -Coreg 25 BID  -Further BP control with amlodipine and clonidine  -Telemetry  -Strict I's and O's  -Keep K > 4 and Mg > 2; caution with repletion given kidney failure

## 2022-10-16 NOTE — SUBJECTIVE & OBJECTIVE
"Interval History: see "Hospital Course"    Review of Systems   Constitutional:  Positive for activity change and fatigue. Negative for appetite change and fever.   HENT: Negative.     Eyes: Negative.    Respiratory:  Negative for shortness of breath.    Cardiovascular:  Positive for leg swelling.   Gastrointestinal:  Negative for abdominal distention, abdominal pain, constipation, diarrhea, nausea and vomiting.   Endocrine: Negative.    Genitourinary:  Positive for scrotal swelling. Negative for difficulty urinating, flank pain and frequency.   Musculoskeletal: Negative.    Skin: Negative.    Allergic/Immunologic: Negative.    Neurological: Negative.    Hematological: Negative.    Psychiatric/Behavioral:  Negative for decreased concentration.    Objective:     Vital Signs (Most Recent):  Temp: 98.6 °F (37 °C) (10/16/22 0721)  Pulse: 76 (10/16/22 0721)  Resp: 18 (10/16/22 0721)  BP: 138/80 (10/16/22 0721)  SpO2: 100 % (10/16/22 0740) Vital Signs (24h Range):  Temp:  [97 °F (36.1 °C)-98.6 °F (37 °C)] 98.6 °F (37 °C)  Pulse:  [66-78] 76  Resp:  [16-18] 18  SpO2:  [95 %-100 %] 100 %  BP: (123-138)/(66-86) 138/80     Weight: (!) 140.6 kg (310 lb)  Body mass index is 39.8 kg/m².    Intake/Output Summary (Last 24 hours) at 10/16/2022 0914  Last data filed at 10/16/2022 0839  Gross per 24 hour   Intake --   Output 3075 ml   Net -3075 ml      Physical Exam  Vitals and nursing note reviewed.   Constitutional:       General: He is not in acute distress.     Appearance: He is obese. He is ill-appearing.      Comments: Anasarca.   HENT:      Head: Normocephalic and atraumatic.      Right Ear: External ear normal.      Left Ear: External ear normal.      Nose: Nose normal.      Mouth/Throat:      Mouth: Mucous membranes are moist.      Pharynx: Oropharynx is clear.   Eyes:      Extraocular Movements: Extraocular movements intact.      Conjunctiva/sclera: Conjunctivae normal.   Cardiovascular:      Rate and Rhythm: Normal rate and " regular rhythm.      Pulses: Normal pulses.      Heart sounds: Normal heart sounds.   Pulmonary:      Effort: Pulmonary effort is normal. No respiratory distress.      Breath sounds: No rales.   Abdominal:      General: Bowel sounds are normal. There is no distension.      Palpations: Abdomen is soft.      Tenderness: There is no abdominal tenderness. There is no right CVA tenderness or left CVA tenderness.   Genitourinary:     Comments: Scrotal swelling.  Musculoskeletal:         General: Normal range of motion.      Cervical back: Normal range of motion and neck supple.      Right lower leg: Edema present.      Left lower leg: Edema present.   Skin:     General: Skin is warm and dry.   Neurological:      General: No focal deficit present.      Mental Status: He is alert and oriented to person, place, and time. Mental status is at baseline.   Psychiatric:         Mood and Affect: Mood normal.         Behavior: Behavior normal.     Significant Labs: All pertinent labs within the past 24 hours have been reviewed.    Significant Imaging: I have reviewed all pertinent imaging results/findings within the past 24 hours.

## 2022-10-16 NOTE — PROGRESS NOTES
[facility] Medicine  Progress Note    Patient Name: João Ham  MRN: [id number]  Patient Class: IP- Inpatient   Admission Date: 10/10/2022  Length of Stay: 6 days  Attending Physician: Jono Knowles MD  Primary Care Provider: Primary Doctor No        Subjective:     Principal Problem:Acute renal failure        HPI:  João Ham is a 42 year old male with a past medical history of HTN, obesity, and anemia who presents with multiple weeks of worsening swelling to the legs and scrotum associated with worsening shortness of breath. The patient thought that the swelling possibly was secondary to his blood pressure medication, so he stopped taking these medications multiple days ago. He denies any chest pain or palpitations. He denies any drug, tobacco or alcohol use. He has never experienced swelling of this severity before. He denies any issues with urination. He does endorse fatigue, changes in appetite and nausea and vomiting. While in the ED, the patient was given IV calcium, Dilaudid, and labetalol. A Cardene infusion was also started. Hospital Medicine was consulted for admission.      Overview/Hospital Course:  João Ham is a 42 year old male with a past medical history of HTN, obesity, and anemia who presented with multiple weeks of worsening swelling to the legs and scrotum associated with worsening shortness of breath secondary to hypertensive emergency with acute renal failure, demand ischemia and pulmonary edema. He was started on a Cardene infusion for BP control. He was also started on IV Lasix and metolazone diuresis given volume overload. Nephrology and Cardiology have been consulted. A TTE shows grade III diastolic dysfunction. The patient is also hypocalcemic (likely from renal failure); calcium is being repleted as well as vitamin D; calcitriol has also been started. Nephrology is considering kidney biopsy at this time; anti-GBM Ab, CRISTIANA, and ANCA levels are pending.  "He has been weaned from Cardene and is now on hydralazine 100 TID, Imdur 120, Coreg 25 BID, clonidine 0.1 BID and amlodipine 10 with good BP control. He continues to undergo IV diuresis for fluid overload.      Interval History: see "Hospital Course"    Review of Systems   Constitutional:  Positive for activity change and fatigue. Negative for appetite change and fever.   HENT: Negative.     Eyes: Negative.    Respiratory:  Negative for shortness of breath.    Cardiovascular:  Positive for leg swelling.   Gastrointestinal:  Negative for abdominal distention, abdominal pain, constipation, diarrhea, nausea and vomiting.   Endocrine: Negative.    Genitourinary:  Positive for scrotal swelling. Negative for difficulty urinating, flank pain and frequency.   Musculoskeletal: Negative.    Skin: Negative.    Allergic/Immunologic: Negative.    Neurological: Negative.    Hematological: Negative.    Psychiatric/Behavioral:  Negative for decreased concentration.    Objective:     Vital Signs (Most Recent):  Temp: 98.6 °F (37 °C) (10/16/22 0721)  Pulse: 76 (10/16/22 0721)  Resp: 18 (10/16/22 0721)  BP: 138/80 (10/16/22 0721)  SpO2: 100 % (10/16/22 0740) Vital Signs (24h Range):  Temp:  [97 °F (36.1 °C)-98.6 °F (37 °C)] 98.6 °F (37 °C)  Pulse:  [66-78] 76  Resp:  [16-18] 18  SpO2:  [95 %-100 %] 100 %  BP: (123-138)/(66-86) 138/80     Weight: (!) 140.6 kg (310 lb)  Body mass index is 39.8 kg/m².    Intake/Output Summary (Last 24 hours) at 10/16/2022 0914  Last data filed at 10/16/2022 0839  Gross per 24 hour   Intake --   Output 3075 ml   Net -3075 ml      Physical Exam  Vitals and nursing note reviewed.   Constitutional:       General: He is not in acute distress.     Appearance: He is obese. He is ill-appearing.      Comments: Anasarca.   HENT:      Head: Normocephalic and atraumatic.      Right Ear: External ear normal.      Left Ear: External ear normal.      Nose: Nose normal.      Mouth/Throat:      Mouth: Mucous membranes " are moist.      Pharynx: Oropharynx is clear.   Eyes:      Extraocular Movements: Extraocular movements intact.      Conjunctiva/sclera: Conjunctivae normal.   Cardiovascular:      Rate and Rhythm: Normal rate and regular rhythm.      Pulses: Normal pulses.      Heart sounds: Normal heart sounds.   Pulmonary:      Effort: Pulmonary effort is normal. No respiratory distress.      Breath sounds: No rales.   Abdominal:      General: Bowel sounds are normal. There is no distension.      Palpations: Abdomen is soft.      Tenderness: There is no abdominal tenderness. There is no right CVA tenderness or left CVA tenderness.   Genitourinary:     Comments: Scrotal swelling.  Musculoskeletal:         General: Normal range of motion.      Cervical back: Normal range of motion and neck supple.      Right lower leg: Edema present.      Left lower leg: Edema present.   Skin:     General: Skin is warm and dry.   Neurological:      General: No focal deficit present.      Mental Status: He is alert and oriented to person, place, and time. Mental status is at baseline.   Psychiatric:         Mood and Affect: Mood normal.         Behavior: Behavior normal.     Significant Labs: All pertinent labs within the past 24 hours have been reviewed.    Significant Imaging: I have reviewed all pertinent imaging results/findings within the past 24 hours.      Assessment/Plan:      * Acute renal failure  Renal ultrasound shows no obstructive disease. Urine sodium 91.   -Nephrology consulted  -Monitor UOP and electrolytes  -IV Lasix diuresis with metolazone  -BP control  -Trend creatinine  -Renally dose medications  -Avoid nephrotoxic agents; hold home ARB and thiazide diuretic  -Nephrology considering possible kidney biopsy; continue to control BP  -Follow up serologies      Secondary hypertension  -Hydralazine 100 Q8H  -Imdur 120  -Coreg 25 BID  -Amlodipine 10  -Clonidine 0.1 BID  -PRN IV hydralazine and labetalol  -Continue IV Lasix diuresis  with metolazone  -Nephrology consulted for GORGE  -Cardiology consulted for elevated troponin  -Telemetry  -Start secondary HTN workup with renin/aldosterone ratio given hypokalemia on admission    Acute heart failure with preserved ejection fraction  TTE shows grade III diastolic dysfunction. Improving.  -IV Lasix diuresis with metolazone  -Cardiology consulted  -Hydralazine and Imdur  -Coreg 25 BID  -Further BP control with amlodipine and clonidine  -Telemetry  -Strict I's and O's  -Keep K > 4 and Mg > 2; caution with repletion given kidney failure    Elevated troponin  In setting of HFpEF exacerbation and kidney failure. Improving.  -Telemetry  -Cardiology consulted  -BP control   -Continue diuresis    Obesity (BMI 30-39.9)  Body mass index is 39.8 kg/m². Morbid obesity complicates all aspects of disease management from diagnostic modalities to treatment.      Hypoalbuminemia  -Trend albumin with CMP      Hypocalcemia  PTH elevated. Low vitamin D.  -Trend ionized calcium  -Scheduled calcium and vitamin D repletion  -Replete PRN  -Nephrology consulted  -Calcitriol      Anemia  Iron level low and ferritin at low limit of normal. Patient may benefit from iron repletion.  -Trend Hgb with CBC  -Iron repletion per Nephrology        VTE Risk Mitigation (From admission, onward)         Ordered     heparin (porcine) injection 5,000 Units  Every 8 hours         10/10/22 1955     IP VTE HIGH RISK PATIENT  Once         10/10/22 1955     Place sequential compression device  Until discontinued         10/10/22 1955                Discharge Planning   MARIA ISABEL: 10/18/2022     Code Status: Full Code   Is the patient medically ready for discharge?:     Reason for patient still in hospital (select all that apply): Patient trending condition, Laboratory test, Treatment, Consult recommendations and Pending disposition  Discharge Plan A: Home with family                  Jono Knowles MD  Department of Hospital Medicine   [facility]

## 2022-10-17 PROBLEM — E83.51 HYPOCALCEMIA: Status: RESOLVED | Noted: 2022-10-10 | Resolved: 2022-10-17

## 2022-10-17 PROBLEM — I16.0 MALIGNANT HYPERTENSIVE URGENCY: Status: ACTIVE | Noted: 2022-10-10

## 2022-10-17 PROBLEM — E66.01 SEVERE OBESITY (BMI >= 40): Status: ACTIVE | Noted: 2022-10-17

## 2022-10-17 LAB
ALBUMIN SERPL BCP-MCNC: 2.7 G/DL (ref 3.5–5.2)
ALP SERPL-CCNC: 71 U/L (ref 55–135)
ALT SERPL W/O P-5'-P-CCNC: 31 U/L (ref 10–44)
ANA SER QL IF: NORMAL
ANION GAP SERPL CALC-SCNC: 13 MMOL/L (ref 8–16)
AST SERPL-CCNC: 13 U/L (ref 10–40)
BASOPHILS # BLD AUTO: 0.03 K/UL (ref 0–0.2)
BASOPHILS NFR BLD: 0.6 % (ref 0–1.9)
BILIRUB SERPL-MCNC: 0.3 MG/DL (ref 0.1–1)
BUN SERPL-MCNC: 76 MG/DL (ref 6–20)
CA-I BLDV-SCNC: 1.06 MMOL/L (ref 1.06–1.42)
CALCIUM SERPL-MCNC: 8.6 MG/DL (ref 8.7–10.5)
CHLORIDE SERPL-SCNC: 95 MMOL/L (ref 95–110)
CO2 SERPL-SCNC: 31 MMOL/L (ref 23–29)
CREAT SERPL-MCNC: 6.7 MG/DL (ref 0.5–1.4)
DIFFERENTIAL METHOD: ABNORMAL
EOSINOPHIL # BLD AUTO: 0.1 K/UL (ref 0–0.5)
EOSINOPHIL NFR BLD: 2.7 % (ref 0–8)
ERYTHROCYTE [DISTWIDTH] IN BLOOD BY AUTOMATED COUNT: 15.9 % (ref 11.5–14.5)
EST. GFR  (NO RACE VARIABLE): 10 ML/MIN/1.73 M^2
GLUCOSE SERPL-MCNC: 97 MG/DL (ref 70–110)
HCT VFR BLD AUTO: 27.4 % (ref 40–54)
HGB BLD-MCNC: 8.7 G/DL (ref 14–18)
IMM GRANULOCYTES # BLD AUTO: 0.01 K/UL (ref 0–0.04)
IMM GRANULOCYTES NFR BLD AUTO: 0.2 % (ref 0–0.5)
LYMPHOCYTES # BLD AUTO: 1.1 K/UL (ref 1–4.8)
LYMPHOCYTES NFR BLD: 20.6 % (ref 18–48)
MAGNESIUM SERPL-MCNC: 2 MG/DL (ref 1.6–2.6)
MCH RBC QN AUTO: 28.1 PG (ref 27–31)
MCHC RBC AUTO-ENTMCNC: 31.8 G/DL (ref 32–36)
MCV RBC AUTO: 88 FL (ref 82–98)
MONOCYTES # BLD AUTO: 0.9 K/UL (ref 0.3–1)
MONOCYTES NFR BLD: 17.3 % (ref 4–15)
NEUTROPHILS # BLD AUTO: 3.1 K/UL (ref 1.8–7.7)
NEUTROPHILS NFR BLD: 58.6 % (ref 38–73)
NRBC BLD-RTO: 0 /100 WBC
PHOSPHATE SERPL-MCNC: 5.9 MG/DL (ref 2.7–4.5)
PLATELET # BLD AUTO: 335 K/UL (ref 150–450)
PMV BLD AUTO: 10.5 FL (ref 9.2–12.9)
POTASSIUM SERPL-SCNC: 4.1 MMOL/L (ref 3.5–5.1)
PROT SERPL-MCNC: 6.2 G/DL (ref 6–8.4)
RBC # BLD AUTO: 3.1 M/UL (ref 4.6–6.2)
SODIUM SERPL-SCNC: 139 MMOL/L (ref 136–145)
WBC # BLD AUTO: 5.25 K/UL (ref 3.9–12.7)

## 2022-10-17 PROCEDURE — 12000002 HC ACUTE/MED SURGE SEMI-PRIVATE ROOM

## 2022-10-17 PROCEDURE — 82330 ASSAY OF CALCIUM: CPT | Performed by: STUDENT IN AN ORGANIZED HEALTH CARE EDUCATION/TRAINING PROGRAM

## 2022-10-17 PROCEDURE — 63600175 PHARM REV CODE 636 W HCPCS: Performed by: INTERNAL MEDICINE

## 2022-10-17 PROCEDURE — 80053 COMPREHEN METABOLIC PANEL: CPT | Performed by: STUDENT IN AN ORGANIZED HEALTH CARE EDUCATION/TRAINING PROGRAM

## 2022-10-17 PROCEDURE — 85025 COMPLETE CBC W/AUTO DIFF WBC: CPT | Performed by: STUDENT IN AN ORGANIZED HEALTH CARE EDUCATION/TRAINING PROGRAM

## 2022-10-17 PROCEDURE — 94761 N-INVAS EAR/PLS OXIMETRY MLT: CPT

## 2022-10-17 PROCEDURE — 25000003 PHARM REV CODE 250: Performed by: INTERNAL MEDICINE

## 2022-10-17 PROCEDURE — 63600175 PHARM REV CODE 636 W HCPCS: Performed by: RADIOLOGY

## 2022-10-17 PROCEDURE — 25000003 PHARM REV CODE 250: Performed by: NURSE PRACTITIONER

## 2022-10-17 PROCEDURE — 25000003 PHARM REV CODE 250: Performed by: RADIOLOGY

## 2022-10-17 PROCEDURE — 25000003 PHARM REV CODE 250: Performed by: STUDENT IN AN ORGANIZED HEALTH CARE EDUCATION/TRAINING PROGRAM

## 2022-10-17 PROCEDURE — 63600175 PHARM REV CODE 636 W HCPCS: Performed by: STUDENT IN AN ORGANIZED HEALTH CARE EDUCATION/TRAINING PROGRAM

## 2022-10-17 PROCEDURE — 83735 ASSAY OF MAGNESIUM: CPT | Performed by: STUDENT IN AN ORGANIZED HEALTH CARE EDUCATION/TRAINING PROGRAM

## 2022-10-17 PROCEDURE — 63600175 PHARM REV CODE 636 W HCPCS: Performed by: HOSPITALIST

## 2022-10-17 PROCEDURE — 27000221 HC OXYGEN, UP TO 24 HOURS

## 2022-10-17 PROCEDURE — 36415 COLL VENOUS BLD VENIPUNCTURE: CPT | Performed by: STUDENT IN AN ORGANIZED HEALTH CARE EDUCATION/TRAINING PROGRAM

## 2022-10-17 PROCEDURE — 84100 ASSAY OF PHOSPHORUS: CPT | Performed by: STUDENT IN AN ORGANIZED HEALTH CARE EDUCATION/TRAINING PROGRAM

## 2022-10-17 RX ORDER — LIDOCAINE HYDROCHLORIDE 10 MG/ML
INJECTION INFILTRATION; PERINEURAL
Status: COMPLETED | OUTPATIENT
Start: 2022-10-17 | End: 2022-10-17

## 2022-10-17 RX ORDER — FUROSEMIDE 10 MG/ML
80 INJECTION INTRAMUSCULAR; INTRAVENOUS
Status: DISCONTINUED | OUTPATIENT
Start: 2022-10-18 | End: 2022-10-18 | Stop reason: HOSPADM

## 2022-10-17 RX ORDER — FUROSEMIDE 10 MG/ML
80 INJECTION INTRAMUSCULAR; INTRAVENOUS
Status: DISCONTINUED | OUTPATIENT
Start: 2022-10-17 | End: 2022-10-17

## 2022-10-17 RX ORDER — MIDAZOLAM HYDROCHLORIDE 1 MG/ML
INJECTION INTRAMUSCULAR; INTRAVENOUS
Status: COMPLETED | OUTPATIENT
Start: 2022-10-17 | End: 2022-10-17

## 2022-10-17 RX ORDER — METOPROLOL SUCCINATE 50 MG/1
100 TABLET, EXTENDED RELEASE ORAL DAILY
Status: DISCONTINUED | OUTPATIENT
Start: 2022-10-18 | End: 2022-10-18

## 2022-10-17 RX ORDER — FENTANYL CITRATE 50 UG/ML
INJECTION, SOLUTION INTRAMUSCULAR; INTRAVENOUS
Status: COMPLETED | OUTPATIENT
Start: 2022-10-17 | End: 2022-10-17

## 2022-10-17 RX ADMIN — CARVEDILOL 25 MG: 25 TABLET, FILM COATED ORAL at 08:10

## 2022-10-17 RX ADMIN — HEPARIN SODIUM 5000 UNITS: 5000 INJECTION INTRAVENOUS; SUBCUTANEOUS at 09:10

## 2022-10-17 RX ADMIN — CALCIUM CARBONATE (ANTACID) CHEW TAB 500 MG 1000 MG: 500 CHEW TAB at 04:10

## 2022-10-17 RX ADMIN — SEVELAMER CARBONATE 1600 MG: 800 TABLET, FILM COATED ORAL at 04:10

## 2022-10-17 RX ADMIN — ISOSORBIDE MONONITRATE 120 MG: 60 TABLET, EXTENDED RELEASE ORAL at 08:10

## 2022-10-17 RX ADMIN — HYDRALAZINE HYDROCHLORIDE 100 MG: 25 TABLET, FILM COATED ORAL at 06:10

## 2022-10-17 RX ADMIN — MELATONIN TAB 3 MG 6 MG: 3 TAB at 09:10

## 2022-10-17 RX ADMIN — SEVELAMER CARBONATE 1600 MG: 800 TABLET, FILM COATED ORAL at 12:10

## 2022-10-17 RX ADMIN — CLONIDINE HYDROCHLORIDE 0.1 MG: 0.1 TABLET ORAL at 08:10

## 2022-10-17 RX ADMIN — MIDAZOLAM HYDROCHLORIDE 1 MG: 1 INJECTION, SOLUTION INTRAMUSCULAR; INTRAVENOUS at 10:10

## 2022-10-17 RX ADMIN — FUROSEMIDE 80 MG: 10 INJECTION, SOLUTION INTRAMUSCULAR; INTRAVENOUS at 12:10

## 2022-10-17 RX ADMIN — FENTANYL CITRATE 25 MCG: 50 INJECTION INTRAMUSCULAR; INTRAVENOUS at 10:10

## 2022-10-17 RX ADMIN — FUROSEMIDE 60 MG: 10 INJECTION, SOLUTION INTRAMUSCULAR; INTRAVENOUS at 06:10

## 2022-10-17 RX ADMIN — AMLODIPINE BESYLATE 10 MG: 5 TABLET ORAL at 08:10

## 2022-10-17 RX ADMIN — MIDAZOLAM HYDROCHLORIDE 1 MG: 1 INJECTION, SOLUTION INTRAMUSCULAR; INTRAVENOUS at 11:10

## 2022-10-17 RX ADMIN — LIDOCAINE HYDROCHLORIDE 10 ML: 10 INJECTION, SOLUTION INFILTRATION; PERINEURAL at 11:10

## 2022-10-17 RX ADMIN — CALCIUM CARBONATE (ANTACID) CHEW TAB 500 MG 1000 MG: 500 CHEW TAB at 12:10

## 2022-10-17 RX ADMIN — CLONIDINE HYDROCHLORIDE 0.1 MG: 0.1 TABLET ORAL at 09:10

## 2022-10-17 RX ADMIN — FENTANYL CITRATE 25 MCG: 50 INJECTION INTRAMUSCULAR; INTRAVENOUS at 11:10

## 2022-10-17 RX ADMIN — POTASSIUM CHLORIDE 20 MEQ: 1500 TABLET, EXTENDED RELEASE ORAL at 09:10

## 2022-10-17 RX ADMIN — METOLAZONE 5 MG: 2.5 TABLET ORAL at 08:10

## 2022-10-17 NOTE — PLAN OF CARE
Problem: Fluid and Electrolyte Imbalance (Acute Kidney Injury/Impairment)  Goal: Fluid and Electrolyte Balance  Outcome: Ongoing, Progressing     Problem: Renal Function Impairment (Acute Kidney Injury/Impairment)  Goal: Effective Renal Function  Outcome: Ongoing, Progressing     Problem: Fall Injury Risk  Goal: Absence of Fall and Fall-Related Injury  Outcome: Ongoing, Progressing

## 2022-10-17 NOTE — PROGRESS NOTES
Nephrology Progress Note        Patient Name: João Ham  MRN: [id number]    Patient Class: IP- Inpatient   Admission Date: 10/10/2022  Length of Stay: 7 days  Date of Service: 10/17/2022    Attending Physician: Mayito Chase MD  Primary Care Provider: Primary Doctor No    Reason for Consult: clarke/ckd/uremia/acidosis/chf/htn/anemia    SUBJECTIVE:     HPI: 42M with little previous follow-up but a PMHx of treated HTN presents to the ED with progressive leg swelling, scrotal swelling, abdominal distention, and SOB over approximately 2 weeks with 1 episode of racing heart. Patient reports he has a good walk from his vehicle to his office at work and notes he had to stop to catch his breath. He denies prior similar episodes. He stopped his Amlodipine, Valsartin and HCTZ a week ago because he thought it was contributing to his swelling. Per my discussion with Dr. Fox, agree with cards eval and diuretic due to uncontrolled HTN,as well as renal US and UA. I added Phos, Mg, PTH, am vitamin D levels in AM to evaluate further.    10/11 VSS. Renal US is unremarkable, UA is bland. Vitamin D is low and PTH is high, add oral D3 and oral calcium supplement. Agree with cards recommendations and med changes.    10/12 VSS. Will quantify proteinuria. Etiology of such advanced CKD is unclear. Not sure that there is anything treatable, but a renal biopsy maybe important from prognostic stand point in this young man with possible dialysis and transplantation in the future. Will discuss with patient, hold blood thinners. Relax K restriction, give oral KCL. Continue diuretics.    10/13 VSS, sCr better, BP still high. Continu Lasix, add metolazone. Postpone kidney biopsy, re-evaluate in AM.    10/14 VSS, no new complains, edema better. Re-evaluate in AM. Postpone kidney biopsy for next week or outpatient.    10/15  UOP 1.3L, pressures much improved.  Renal function not improving.  No s/sx uremia, no complaints other than not sleeping  well.      10/16  UOP 2.7L, BP controlled.  Renal function same.  PO4 creeping up, calcitriol added yesterday.  Will increase renvela to 2 tabs w/meals.  Complements normal.  No new complaints.    10/17 VSS, on 1L NC, UOP 4.2L, net -25L, went for renal biopsy with no issues, appetite is good, still with edema but better    Outpatient meds:  No current facility-administered medications on file prior to encounter.     Current Outpatient Medications on File Prior to Encounter   Medication Sig Dispense Refill    amlodipine-valsartan (EXFORGE)  mg per tablet Take 1 tablet by mouth once daily.      hydroCHLOROthiazide (HYDRODIURIL) 25 MG tablet Take 25 mg by mouth once daily.         Scheduled meds:   amLODIPine  10 mg Oral Daily    calcitRIOL  0.25 mcg Oral Daily    calcium carbonate  1,000 mg Oral TID WM    carvediloL  25 mg Oral BID    cholecalciferol (vitamin D3)  5,000 Units Oral Daily    cloNIDine  0.1 mg Oral BID    furosemide (LASIX) injection  60 mg Intravenous Q12H    heparin (porcine)  5,000 Units Subcutaneous Q8H    hydrALAZINE  100 mg Oral Q8H    isosorbide mononitrate  120 mg Oral Daily    LIDOcaine  1 patch Transdermal Q24H    melatonin  6 mg Oral Nightly    metOLazone  5 mg Oral Daily    potassium chloride  20 mEq Oral BID    sevelamer carbonate  1,600 mg Oral TID WM       Infusions:        PRN meds:      Review of Systems:  Neg    OBJECTIVE:     Vital Signs and IO:  Temp:  [96.5 °F (35.8 °C)-98.7 °F (37.1 °C)]   Pulse:  [72-79]   Resp:  [16-24]   BP: (112-143)/(58-93)   SpO2:  [94 %-100 %]   I/O last 3 completed shifts:  In: 720 [P.O.:720]  Out: 5975 [Urine:5975]  Wt Readings from Last 5 Encounters:   10/11/22 (!) 140.6 kg (310 lb)     Body mass index is 39.8 kg/m².    Physical Exam  Constitutional:       General: He is not in acute distress.     Appearance: He is well-developed. He is not diaphoretic.   HENT:      Head: Normocephalic and atraumatic.      Mouth/Throat:      Mouth: Mucous membranes  are moist.   Eyes:      General: No scleral icterus.     Pupils: Pupils are equal, round, and reactive to light.   Cardiovascular:      Rate and Rhythm: Normal rate and regular rhythm.   Pulmonary:      Effort: Pulmonary effort is normal. No respiratory distress.      Breath sounds: No stridor.   Abdominal:      General: There is distension.      Palpations: Abdomen is soft.   Musculoskeletal:         General: Swelling present. No deformity. Normal range of motion.      Cervical back: Neck supple.   Skin:     General: Skin is warm and dry.      Findings: No erythema or rash.   Neurological:      Mental Status: He is alert and oriented to person, place, and time.      Cranial Nerves: No cranial nerve deficit.   Psychiatric:         Behavior: Behavior normal.     Laboratory:  Recent Labs   Lab 10/15/22  0512 10/16/22  0508 10/17/22  0513    138 139   K 3.9 3.9 4.1   CL 96 94* 95   CO2 29 30* 31*   BUN 67* 70* 76*   CREATININE 6.7* 6.6* 6.7*   GLU 97 92 97         Recent Labs   Lab 10/15/22  0512 10/16/22  0508 10/17/22  0513   CALCIUM 7.8* 7.9* 8.6*   ALBUMIN 2.5* 2.6* 2.7*   PHOS 5.7* 6.1* 5.9*   MG 1.8 1.9 2.0         Recent Labs   Lab 10/10/22  1452 10/10/22  2005   PTH, Intact 632.4 H 517.7 H         No results for input(s): POCTGLUCOSE in the last 168 hours.    Recent Labs   Lab 10/10/22  2005   Hemoglobin A1C 5.5         Recent Labs   Lab 10/15/22  0512 10/16/22  0508 10/17/22  0513   WBC 6.14 5.79 5.25   HGB 8.6* 8.9* 8.7*   HCT 26.9* 28.1* 27.4*    318 335   MCV 87 88 88   MCHC 32.0 31.7* 31.8*   MONO 16.9*  1.0 17.3*  1.0 17.3*  0.9         Recent Labs   Lab 10/15/22  0512 10/16/22  0508 10/17/22  0513   BILITOT 0.4 0.3 0.3   PROT 5.8* 6.1 6.2   ALBUMIN 2.5* 2.6* 2.7*   ALKPHOS 69 67 71   ALT 40 37 31   AST 20 15 13         Recent Labs   Lab 10/10/22  1645   Color, UA Yellow   Appearance, UA Clear   pH, UA 6.0   Specific Gravity, UA 1.025   Protein, UA 2+ A   Glucose, UA Negative   Ketones, UA  Negative   Urobilinogen, UA Negative   Bilirubin (UA) Negative   Occult Blood UA 1+ A   Nitrite, UA Negative   RBC, UA 1   WBC, UA 0   Bacteria None   Hyaline Casts, UA 0               Microbiology Results (last 7 days)       ** No results found for the last 168 hours. 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 HTN nephrosclerosis or FSGS- awaiting a renal biopsy  - renal function is stable  - nonoliguric and diuretic responsive  - no acute clearance/volume removal needs to warrant RRT now but in the near future  - no nsaids or IV contrast  - dose meds for CrCl < 10    HTN  CHF  Hypokalemia  - clinically improved- almost net -25L for the admission  - more alkalotic- change IV lasix to BID  - continue metolazone 5mg daily  - hold ARB  - continue standing KCl supplement  - renal diet, 1.5L fluid restriction    Anemia of CKD  - will set up for LOR as outpatient    CKD-MBD / Secondary HPT/ vitamin D deficiency  Hypocalcemia  Hyperphosphatemia  - parameters are improved- continue calcium carbo, D3, calcitriol and sevelamer     Thank you for allowing us to participate in the care of your patient!   We will follow the patient and provide recommendations as needed.    Patient care time was spent personally by me on the following activities: > 35 min    Obtaining a history.  Examination of patient.  Providing medical care at the patients bedside.  Developing a treatment plan with patient or surrogate and bedside caregivers.  Ordering and reviewing laboratory studies, radiographic studies, pulse oximetry.  Ordering and performing treatments and interventions.  Evaluation of patient's response to treatment.  Discussions with consultants while on the unit and immediately available to the patient.  Re-evaluation of the patient's condition.  Documentation in the medical record.     Oni Garcia MD      [facility]  [address] [place] [address], [place] [address]    [phone] -  tel  [phone] - fax    10/17/2022

## 2022-10-17 NOTE — CHAPLAIN
Attempted to visit pt during general rounding, but pt and his bed not in the room; must be at a procedure; left my card on his bedside table.  will [place] back. , in your mercy.

## 2022-10-17 NOTE — NURSING
Pt consented in CT by radiologist.   Arrived to CT via stretcher from 216 .   AAOx4, Time out performed.   Pt received Fentanyl  75 mcg and Versed 3 mg IV.   Vital signs monitored and remained stable for duration of procedure.   Left kidney Biopsies collected By Dr. Guidry x 4 passes, and placed in the appropriate [place] containers.   Specimens and all other required documentation submitted to lab for Pathology. Bandage applied to pts left posterior back. CDI.  Report called to Roderick, primary nurse. - Pt transported back to room via bed.

## 2022-10-17 NOTE — PT/OT/SLP PROGRESS
"Occupational Therapy      Patient Name:  João Ham   MRN:  [id number]    Attempted OT evaluation this AM. Patient refused participation stating "I can do all of my self care. I don't need help with that." OT orders will be discontinued per patient's request. Nurse notified.    10/17/2022  "

## 2022-10-17 NOTE — CARE UPDATE
"   10/17/22 0741   Patient Assessment/Suction   Level of Consciousness (AVPU) alert   PRE-TX-O2   O2 Device (Oxygen Therapy) nasal cannula  (Previous shift nurse placed pt on NC 1L)   $ Is the patient on Low Flow Oxygen? Yes   Flow (L/min) 1   SpO2 100 %   Pulse Oximetry Type Intermittent   $ Pulse Oximetry - Multiple Charge Pulse Oximetry - Multiple     Pt states nurse from previous  shift placed him on O2. "She said I have sleep apnea & I need to have oxygen". No documentation noted of sleep apnea. O2 sats on room air 96-98%. Pt declined to be placed on room air.  "

## 2022-10-17 NOTE — PROGRESS NOTES
[facility] Hospital Medicine  Progress Note    Patient Name: João Ham  MRN: [id number]  Patient Class: IP- Inpatient   Admission Date: 10/10/2022  Length of Stay: 7 days  Attending Physician: Mayito Chase MD  Primary Care Provider: Primary Doctor No        Subjective:     Principal Problem:Acute renal failure        HPI:  João Ham is a 42 year old male with a past medical history of HTN, obesity, and anemia who presents with multiple weeks of worsening swelling to the legs and scrotum associated with worsening shortness of breath. The patient thought that the swelling possibly was secondary to his blood pressure medication, so he stopped taking these medications multiple days ago. He denies any chest pain or palpitations. He denies any drug, tobacco or alcohol use. He has never experienced swelling of this severity before. He denies any issues with urination. He does endorse fatigue, changes in appetite and nausea and vomiting. While in the ED, the patient was given IV calcium, Dilaudid, and labetalol. A Cardene infusion was also started. Hospital Medicine was consulted for admission.      Overview/Hospital Course:  João Ham is a 42 year old male with a past medical history of HTN, obesity, and anemia who presented with multiple weeks of worsening swelling to the legs and scrotum associated with worsening shortness of breath secondary to hypertensive emergency with acute renal failure, demand ischemia and pulmonary edema. He was started on a Cardene infusion for BP control. He was also started on IV Lasix and metolazone diuresis given volume overload. Nephrology and Cardiology have been consulted. A TTE shows grade III diastolic dysfunction. The patient is also hypocalcemic (likely from renal failure); calcium is being repleted as well as vitamin D; calcitriol has also been started. Nephrology is considering kidney biopsy at this time; anti-GBM Ab, CRISTIANA, and ANCA levels are pending. He  has been weaned from Cardene and is now on hydralazine 100 TID, Imdur 120, Coreg 25 BID, clonidine 0.1 BID and amlodipine 10 with good BP control. He continues to undergo IV diuresis for fluid overload.      Interval History:  Patient seen and examined.  No acute events overnight.  Plan of care discussed with the patient at the bedside in detail.  Patient currently waiting for kidney biopsy today.    Review of Systems   Constitutional:  Negative for chills, fatigue and fever.   Respiratory:  Negative for cough and shortness of breath.    Cardiovascular:  Negative for chest pain and leg swelling.   Gastrointestinal:  Negative for abdominal pain, nausea and vomiting.   Musculoskeletal:  Negative for back pain.   Neurological:  Negative for weakness.   Psychiatric/Behavioral:  Negative for confusion. The patient is not nervous/anxious.    All other systems reviewed and are negative.  Objective:     Vital Signs (Most Recent):  Temp: 97.5 °F (36.4 °C) (10/17/22 0728)  Pulse: 74 (10/17/22 0728)  Resp: 16 (10/17/22 0728)  BP: (!) 143/93 (10/17/22 0728)  SpO2: 100 % (10/17/22 0855)   Vital Signs (24h Range):  Temp:  [96.5 °F (35.8 °C)-98.7 °F (37.1 °C)] 97.5 °F (36.4 °C)  Pulse:  [72-79] 74  Resp:  [16-24] 16  SpO2:  [94 %-100 %] 100 %  BP: (112-143)/(58-93) 143/93     Weight: (!) 140.6 kg (310 lb)  Body mass index is 39.8 kg/m².    Intake/Output Summary (Last 24 hours) at 10/17/2022 1037  Last data filed at 10/17/2022 0617  Gross per 24 hour   Intake 480 ml   Output 3200 ml   Net -2720 ml      Physical Exam  Vitals and nursing note reviewed.   Eyes:      Pupils: Pupils are equal, round, and reactive to light.   Neck:      Vascular: No JVD.   Cardiovascular:      Rate and Rhythm: Normal rate and regular rhythm.      Heart sounds: Normal heart sounds.   Pulmonary:      Effort: Pulmonary effort is normal.      Breath sounds: Normal breath sounds.   Abdominal:      General: Bowel sounds are normal. There is no distension.       Palpations: Abdomen is soft.      Tenderness: There is no abdominal tenderness.   Musculoskeletal:         General: No deformity.      Right lower leg: Edema present.      Left lower leg: Edema present.      Comments: 3+ bilateral lower extremity edema   Lymphadenopathy:      Cervical: No cervical adenopathy.   Skin:     Coloration: Skin is not pale.      Findings: No rash.       Significant Labs: All pertinent labs within the past 24 hours have been reviewed.    Significant Imaging: I have reviewed all pertinent imaging results/findings within the past 24 hours.      Assessment/Plan:      * Acute renal failure  Patient with acute kidney injury likely d/t Pre-renal azotemia and Acute tubular necrosis  caused by unknown etiology, workup currently in progress. GORGE is currently stable. Labs reviewed- Renal function/electrolytes with Estimated Creatinine Clearance: 21.7 mL/min (A) (based on SCr of 6.7 mg/dL (H)). according to latest data. Monitor urine output and serial BMP and adjust therapy as needed. Avoid nephrotoxins and renally dose meds for GFR listed above.     Autoimmune labs showed no evidence of ongoing autoimmune disease.  Complement levels appear to be normal.  Nephrology consulted, defer to them for further evaluation and management.  Likely etiology is focal segmental glomerulosclerosis.  Patient to undergo renal biopsy today.  Continue diuresis for fluid overload-increase Lasix to 80 mg t.i.d..        Acute heart failure with preserved ejection fraction  Patient is identified as having Diastolic (HFpEF) heart failure that is Acute on Chronic. CHF is currently uncontrolled due to volume overload due to: Continued edema of extremities. Latest ECHO performed and demonstrates- Results for orders placed during the hospital encounter of 10/10/22    Echo    Interpretation Summary  · The estimated PA systolic pressure is 48 mmHg.  · The left ventricle is normal in size with mild concentric hypertrophy and normal  systolic function.  · Grade III left ventricular diastolic dysfunction.  · Moderate right ventricular enlargement with mildly to moderately reduced right ventricular systolic function.  · Severe left atrial enlargement.  · There is moderate pulmonary hypertension.  · Intermediate central venous pressure (8 mmHg).  · Mild-to-moderate mitral regurgitation.  · Moderate to severe tricuspid regurgitation.  · Moderate right atrial enlargement.  · The estimated ejection fraction is 60%.  · Atrial fibrillation not observed.  . Continue Beta Blocker Furosemide Nitrate/Vasodilator, calcium channel blocker, metolazone and monitor clinical status closely. Monitor on telemetry. Patient is off CHF pathway.  Monitor strict Is&Os and daily weights.  Place on fluid restriction of 1.5 L. Continue to stress to patient importance of self efficacy and  on diet for CHF. Last BNP reviewed- and noted below   Recent Labs   Lab 10/10/22  1452   BNP 3,678*     Cardiology consulted, will attempt to trim the patient's blood pressure medication to a more manageable number as he currently is on 8 pills a day.          Anemia  Patient's anemia is currently controlled. Has not received any PRBCs to date.. Etiology likely d/t anemia chronic disease-renal failure  Current CBC reviewed-   Lab Results   Component Value Date    HGB 8.7 (L) 10/17/2022    HCT 27.4 (L) 10/17/2022     Monitor serial CBC and transfuse if patient becomes hemodynamically unstable, symptomatic or H/H drops below 7/21.         Severe obesity (BMI >= 40)  Body mass index is 39.8 kg/m². Morbid obesity complicates all aspects of disease management from diagnostic modalities to treatment. Weight loss encouraged and health benefits explained to patient.         Elevated troponin  In setting of HFpEF exacerbation and kidney failure. Improving.  -Telemetry  -Cardiology consulted  -BP control   -Continue diuresis    Obesity (BMI 30-39.9)  Body mass index is 39.8 kg/m². Morbid  obesity complicates all aspects of disease management from diagnostic modalities to treatment.      Hypoalbuminemia  -Trend albumin with CMP      Malignant hypertensive urgency  Patient has a current diagnosis of hypertensive urgency/emergency diagnosis: hypertensive emergency with end organ damage evidenced by acute kidney injury and acute heart failure which is controlled.  Latest blood pressure and vitals reviewed-   Temp:  [96.5 °F (35.8 °C)-98.7 °F (37.1 °C)]   Pulse:  [70-79]   Resp:  [9-24]   BP: (112-143)/(58-93)   SpO2:  [94 %-100 %] .   Patient currently off IV antihypertensives.   Home meds for hypertension were reviewed and noted below.   Hypertension Medications             amlodipine-valsartan (EXFORGE)  mg per tablet Take 1 tablet by mouth once daily.    hydroCHLOROthiazide (HYDRODIURIL) 25 MG tablet Take 25 mg by mouth once daily.          Medication adjustment for hospital antihypertensives is as follows- Continue patient on metoprolol, clonidine, Imdur, and Norvasc.  Stop hydralazine as this patient is likely to not take 8 pills a day for blood pressure.  Consider increasing dose of metoprolol if patient tolerates this well.    Will goal for controlled BP reduction as noted be medications noted above and monitor and mitigate end organ damage as indicated.          VTE Risk Mitigation (From admission, onward)         Ordered     heparin (porcine) injection 5,000 Units  Every 8 hours         10/10/22 1955     IP VTE HIGH RISK PATIENT  Once         10/10/22 1955     Place sequential compression device  Until discontinued         10/10/22 1955                Discharge Planning   MARIA ISABEL: 10/18/2022     Code Status: Full Code   Is the patient medically ready for discharge?:     Reason for patient still in hospital (select all that apply): Treatment  Discharge Plan A: Home                  Mayito Chase MD  Department of Hospital Medicine   [facility]

## 2022-10-17 NOTE — ASSESSMENT & PLAN NOTE
Patient has a current diagnosis of hypertensive urgency/emergency diagnosis: hypertensive emergency with end organ damage evidenced by acute kidney injury and acute heart failure which is controlled.  Latest blood pressure and vitals reviewed-   Temp:  [96.5 °F (35.8 °C)-98.7 °F (37.1 °C)]   Pulse:  [70-79]   Resp:  [9-24]   BP: (112-143)/(58-93)   SpO2:  [94 %-100 %] .   Patient currently off IV antihypertensives.   Home meds for hypertension were reviewed and noted below.   Hypertension Medications             amlodipine-valsartan (EXFORGE)  mg per tablet Take 1 tablet by mouth once daily.    hydroCHLOROthiazide (HYDRODIURIL) 25 MG tablet Take 25 mg by mouth once daily.          Medication adjustment for hospital antihypertensives is as follows- Continue patient on metoprolol, clonidine, Imdur, and Norvasc.  Stop hydralazine as this patient is likely to not take 8 pills a day for blood pressure.  Consider increasing dose of metoprolol if patient tolerates this well.    Will goal for controlled BP reduction as noted be medications noted above and monitor and mitigate end organ damage as indicated.

## 2022-10-17 NOTE — PLAN OF CARE
Pt will have kidney biopsy, not medically cleared.  Dishcarge plan to be determined, based on biopsy results.  No needs identified at this time.     10/17/22 0927   Discharge Reassessment   Assessment Type Discharge Planning Reassessment   Did the patient's condition or plan change since previous assessment? Yes   Discharge Plan A Home   Discharge Plan B Home Health

## 2022-10-17 NOTE — ASSESSMENT & PLAN NOTE
Patient is identified as having Diastolic (HFpEF) heart failure that is Acute on Chronic. CHF is currently uncontrolled due to volume overload due to: Continued edema of extremities. Latest ECHO performed and demonstrates- Results for orders placed during the hospital encounter of 10/10/22    Echo    Interpretation Summary  · The estimated PA systolic pressure is 48 mmHg.  · The left ventricle is normal in size with mild concentric hypertrophy and normal systolic function.  · Grade III left ventricular diastolic dysfunction.  · Moderate right ventricular enlargement with mildly to moderately reduced right ventricular systolic function.  · Severe left atrial enlargement.  · There is moderate pulmonary hypertension.  · Intermediate central venous pressure (8 mmHg).  · Mild-to-moderate mitral regurgitation.  · Moderate to severe tricuspid regurgitation.  · Moderate right atrial enlargement.  · The estimated ejection fraction is 60%.  · Atrial fibrillation not observed.  . Continue Beta Blocker Furosemide Nitrate/Vasodilator, calcium channel blocker, metolazone and monitor clinical status closely. Monitor on telemetry. Patient is off CHF pathway.  Monitor strict Is&Os and daily weights.  Place on fluid restriction of 1.5 L. Continue to stress to patient importance of self efficacy and  on diet for CHF. Last BNP reviewed- and noted below   Recent Labs   Lab 10/10/22  1452   BNP 3,678*     Cardiology consulted, will attempt to trim the patient's blood pressure medication to a more manageable number as he currently is on 8 pills a day.

## 2022-10-17 NOTE — ASSESSMENT & PLAN NOTE
Body mass index is 39.8 kg/m². Morbid obesity complicates all aspects of disease management from diagnostic modalities to treatment. Weight loss encouraged and health benefits explained to patient.

## 2022-10-17 NOTE — PT/OT/SLP PROGRESS
Physical Therapy      Patient Name:  João Ham   MRN:  [id number]    Patient not seen today secondary to Other (1st attempt 10:04, pt declined due to NPO and sleeping; requested return. 2nd attempt 11:16, off unit for procedure). Will follow-up 10/18/22.

## 2022-10-17 NOTE — ASSESSMENT & PLAN NOTE
Patient's anemia is currently controlled. Has not received any PRBCs to date.. Etiology likely d/t anemia chronic disease-renal failure  Current CBC reviewed-   Lab Results   Component Value Date    HGB 8.7 (L) 10/17/2022    HCT 27.4 (L) 10/17/2022     Monitor serial CBC and transfuse if patient becomes hemodynamically unstable, symptomatic or H/H drops below 7/21.

## 2022-10-17 NOTE — ASSESSMENT & PLAN NOTE
Patient with acute kidney injury likely d/t Pre-renal azotemia and Acute tubular necrosis  caused by unknown etiology, workup currently in progress. GORGE is currently stable. Labs reviewed- Renal function/electrolytes with Estimated Creatinine Clearance: 21.7 mL/min (A) (based on SCr of 6.7 mg/dL (H)). according to latest data. Monitor urine output and serial BMP and adjust therapy as needed. Avoid nephrotoxins and renally dose meds for GFR listed above.     Autoimmune labs showed no evidence of ongoing autoimmune disease.  Complement levels appear to be normal.  Nephrology consulted, defer to them for further evaluation and management.  Likely etiology is focal segmental glomerulosclerosis.  Patient to undergo renal biopsy today.  Continue diuresis for fluid overload-increase Lasix to 80 mg t.i.d..

## 2022-10-17 NOTE — CARE UPDATE
10/16/22 1926   Patient Assessment/Suction   Level of Consciousness (AVPU) alert   Respiratory Effort Unlabored   PRE-TX-O2   O2 Device (Oxygen Therapy) room air   SpO2 98 %   Pulse Oximetry Type Intermittent   $ Pulse Oximetry - Multiple Charge Pulse Oximetry - Multiple   Pulse 79   Resp 18

## 2022-10-17 NOTE — SUBJECTIVE & OBJECTIVE
Interval History:  Patient seen and examined.  No acute events overnight.  Plan of care discussed with the patient at the bedside in detail.  Patient currently waiting for kidney biopsy today.    Review of Systems   Constitutional:  Negative for chills, fatigue and fever.   Respiratory:  Negative for cough and shortness of breath.    Cardiovascular:  Negative for chest pain and leg swelling.   Gastrointestinal:  Negative for abdominal pain, nausea and vomiting.   Musculoskeletal:  Negative for back pain.   Neurological:  Negative for weakness.   Psychiatric/Behavioral:  Negative for confusion. The patient is not nervous/anxious.    All other systems reviewed and are negative.  Objective:     Vital Signs (Most Recent):  Temp: 97.5 °F (36.4 °C) (10/17/22 0728)  Pulse: 74 (10/17/22 0728)  Resp: 16 (10/17/22 0728)  BP: (!) 143/93 (10/17/22 0728)  SpO2: 100 % (10/17/22 0855)   Vital Signs (24h Range):  Temp:  [96.5 °F (35.8 °C)-98.7 °F (37.1 °C)] 97.5 °F (36.4 °C)  Pulse:  [72-79] 74  Resp:  [16-24] 16  SpO2:  [94 %-100 %] 100 %  BP: (112-143)/(58-93) 143/93     Weight: (!) 140.6 kg (310 lb)  Body mass index is 39.8 kg/m².    Intake/Output Summary (Last 24 hours) at 10/17/2022 1037  Last data filed at 10/17/2022 0617  Gross per 24 hour   Intake 480 ml   Output 3200 ml   Net -2720 ml      Physical Exam  Vitals and nursing note reviewed.   Eyes:      Pupils: Pupils are equal, round, and reactive to light.   Neck:      Vascular: No JVD.   Cardiovascular:      Rate and Rhythm: Normal rate and regular rhythm.      Heart sounds: Normal heart sounds.   Pulmonary:      Effort: Pulmonary effort is normal.      Breath sounds: Normal breath sounds.   Abdominal:      General: Bowel sounds are normal. There is no distension.      Palpations: Abdomen is soft.      Tenderness: There is no abdominal tenderness.   Musculoskeletal:         General: No deformity.      Right lower leg: Edema present.      Left lower leg: Edema present.       Comments: 3+ bilateral lower extremity edema   Lymphadenopathy:      Cervical: No cervical adenopathy.   Skin:     Coloration: Skin is not pale.      Findings: No rash.       Significant Labs: All pertinent labs within the past 24 hours have been reviewed.    Significant Imaging: I have reviewed all pertinent imaging results/findings within the past 24 hours.

## 2022-10-17 NOTE — PLAN OF CARE
Problem: Adult Inpatient Plan of Care  Goal: Patient-Specific Goal (Individualized)  Outcome: Ongoing, Progressing     Pt alert and oriented. Ambulates to bathroom. Plan of care continued. Call light in reach

## 2022-10-17 NOTE — PROGRESS NOTES
Patient for CT guided percutaneous kidney biopsy for function. ASA 2, Mallampati 1,  No personal or family history of problems with anesthesia or sedation. No reason not to proceed. Procedure , risks, alternatives discussed with the patient who consented. Labs as in chart.

## 2022-10-18 VITALS
BODY MASS INDEX: 39.78 KG/M2 | WEIGHT: 309.94 LBS | DIASTOLIC BLOOD PRESSURE: 92 MMHG | TEMPERATURE: 98 F | RESPIRATION RATE: 18 BRPM | OXYGEN SATURATION: 97 % | HEIGHT: 74 IN | HEART RATE: 74 BPM | SYSTOLIC BLOOD PRESSURE: 142 MMHG

## 2022-10-18 LAB
ALBUMIN SERPL BCP-MCNC: 2.7 G/DL (ref 3.5–5.2)
ALP SERPL-CCNC: 64 U/L (ref 55–135)
ALT SERPL W/O P-5'-P-CCNC: 25 U/L (ref 10–44)
ANION GAP SERPL CALC-SCNC: 13 MMOL/L (ref 8–16)
AST SERPL-CCNC: 11 U/L (ref 10–40)
BASOPHILS # BLD AUTO: 0.04 K/UL (ref 0–0.2)
BASOPHILS NFR BLD: 0.8 % (ref 0–1.9)
BILIRUB SERPL-MCNC: 0.3 MG/DL (ref 0.1–1)
BM IGG SER-ACNC: <0.2 U
BUN SERPL-MCNC: 79 MG/DL (ref 6–20)
CA-I BLDV-SCNC: 1.1 MMOL/L (ref 1.06–1.42)
CALCIUM SERPL-MCNC: 9 MG/DL (ref 8.7–10.5)
CHLORIDE SERPL-SCNC: 95 MMOL/L (ref 95–110)
CO2 SERPL-SCNC: 32 MMOL/L (ref 23–29)
CREAT SERPL-MCNC: 6.8 MG/DL (ref 0.5–1.4)
DIFFERENTIAL METHOD: ABNORMAL
EOSINOPHIL # BLD AUTO: 0.2 K/UL (ref 0–0.5)
EOSINOPHIL NFR BLD: 3.3 % (ref 0–8)
ERYTHROCYTE [DISTWIDTH] IN BLOOD BY AUTOMATED COUNT: 15.8 % (ref 11.5–14.5)
EST. GFR  (NO RACE VARIABLE): 10 ML/MIN/1.73 M^2
GLUCOSE SERPL-MCNC: 94 MG/DL (ref 70–110)
HCT VFR BLD AUTO: 28.8 % (ref 40–54)
HGB BLD-MCNC: 9.3 G/DL (ref 14–18)
IMM GRANULOCYTES # BLD AUTO: 0.01 K/UL (ref 0–0.04)
IMM GRANULOCYTES NFR BLD AUTO: 0.2 % (ref 0–0.5)
LYMPHOCYTES # BLD AUTO: 0.9 K/UL (ref 1–4.8)
LYMPHOCYTES NFR BLD: 17 % (ref 18–48)
MAGNESIUM SERPL-MCNC: 2 MG/DL (ref 1.6–2.6)
MCH RBC QN AUTO: 28.5 PG (ref 27–31)
MCHC RBC AUTO-ENTMCNC: 32.3 G/DL (ref 32–36)
MCV RBC AUTO: 88 FL (ref 82–98)
MONOCYTES # BLD AUTO: 1.1 K/UL (ref 0.3–1)
MONOCYTES NFR BLD: 21.6 % (ref 4–15)
NEUTROPHILS # BLD AUTO: 2.9 K/UL (ref 1.8–7.7)
NEUTROPHILS NFR BLD: 57.1 % (ref 38–73)
NRBC BLD-RTO: 0 /100 WBC
PHOSPHATE SERPL-MCNC: 6.3 MG/DL (ref 2.7–4.5)
PLATELET # BLD AUTO: 351 K/UL (ref 150–450)
PMV BLD AUTO: 10.2 FL (ref 9.2–12.9)
POTASSIUM SERPL-SCNC: 4 MMOL/L (ref 3.5–5.1)
PROT SERPL-MCNC: 6.3 G/DL (ref 6–8.4)
RBC # BLD AUTO: 3.26 M/UL (ref 4.6–6.2)
SODIUM SERPL-SCNC: 140 MMOL/L (ref 136–145)
WBC # BLD AUTO: 5.13 K/UL (ref 3.9–12.7)

## 2022-10-18 PROCEDURE — 63600175 PHARM REV CODE 636 W HCPCS: Performed by: INTERNAL MEDICINE

## 2022-10-18 PROCEDURE — 97116 GAIT TRAINING THERAPY: CPT | Mod: CQ

## 2022-10-18 PROCEDURE — 25000003 PHARM REV CODE 250: Performed by: INTERNAL MEDICINE

## 2022-10-18 PROCEDURE — 94761 N-INVAS EAR/PLS OXIMETRY MLT: CPT

## 2022-10-18 PROCEDURE — 25000003 PHARM REV CODE 250: Performed by: STUDENT IN AN ORGANIZED HEALTH CARE EDUCATION/TRAINING PROGRAM

## 2022-10-18 PROCEDURE — 25000003 PHARM REV CODE 250: Performed by: NURSE PRACTITIONER

## 2022-10-18 PROCEDURE — 85025 COMPLETE CBC W/AUTO DIFF WBC: CPT | Performed by: STUDENT IN AN ORGANIZED HEALTH CARE EDUCATION/TRAINING PROGRAM

## 2022-10-18 PROCEDURE — 82330 ASSAY OF CALCIUM: CPT | Performed by: STUDENT IN AN ORGANIZED HEALTH CARE EDUCATION/TRAINING PROGRAM

## 2022-10-18 PROCEDURE — 99233 SBSQ HOSP IP/OBS HIGH 50: CPT | Mod: ,,, | Performed by: SPECIALIST

## 2022-10-18 PROCEDURE — 84100 ASSAY OF PHOSPHORUS: CPT | Performed by: STUDENT IN AN ORGANIZED HEALTH CARE EDUCATION/TRAINING PROGRAM

## 2022-10-18 PROCEDURE — 25000003 PHARM REV CODE 250: Performed by: HOSPITALIST

## 2022-10-18 PROCEDURE — 83735 ASSAY OF MAGNESIUM: CPT | Performed by: STUDENT IN AN ORGANIZED HEALTH CARE EDUCATION/TRAINING PROGRAM

## 2022-10-18 PROCEDURE — 36415 COLL VENOUS BLD VENIPUNCTURE: CPT | Performed by: STUDENT IN AN ORGANIZED HEALTH CARE EDUCATION/TRAINING PROGRAM

## 2022-10-18 PROCEDURE — 99233 PR SUBSEQUENT HOSPITAL CARE,LEVL III: ICD-10-PCS | Mod: ,,, | Performed by: SPECIALIST

## 2022-10-18 PROCEDURE — 80053 COMPREHEN METABOLIC PANEL: CPT | Performed by: STUDENT IN AN ORGANIZED HEALTH CARE EDUCATION/TRAINING PROGRAM

## 2022-10-18 PROCEDURE — 63600175 PHARM REV CODE 636 W HCPCS: Performed by: STUDENT IN AN ORGANIZED HEALTH CARE EDUCATION/TRAINING PROGRAM

## 2022-10-18 RX ORDER — HYDRALAZINE HYDROCHLORIDE 100 MG/1
100 TABLET, FILM COATED ORAL 3 TIMES DAILY
Qty: 90 TABLET | Refills: 11 | OUTPATIENT
Start: 2022-10-18 | End: 2022-10-18 | Stop reason: SDUPTHER

## 2022-10-18 RX ORDER — FUROSEMIDE 80 MG/1
80 TABLET ORAL
Qty: 90 TABLET | Refills: 11 | OUTPATIENT
Start: 2022-10-18 | End: 2022-10-18 | Stop reason: SDUPTHER

## 2022-10-18 RX ORDER — ISOSORBIDE MONONITRATE 120 MG/1
120 TABLET, EXTENDED RELEASE ORAL DAILY
Qty: 30 TABLET | Refills: 11 | Status: ON HOLD | OUTPATIENT
Start: 2022-10-19 | End: 2023-06-28 | Stop reason: SDUPTHER

## 2022-10-18 RX ORDER — AMLODIPINE BESYLATE 10 MG/1
10 TABLET ORAL DAILY
Qty: 30 TABLET | Refills: 11 | Status: SHIPPED | OUTPATIENT
Start: 2022-10-19 | End: 2022-10-18 | Stop reason: HOSPADM

## 2022-10-18 RX ORDER — CLONIDINE HYDROCHLORIDE 0.1 MG/1
0.1 TABLET ORAL 2 TIMES DAILY
Qty: 60 TABLET | Refills: 11 | Status: SHIPPED | OUTPATIENT
Start: 2022-10-18 | End: 2022-10-18 | Stop reason: SDUPTHER

## 2022-10-18 RX ORDER — FUROSEMIDE 80 MG/1
80 TABLET ORAL
Qty: 90 TABLET | Refills: 11 | Status: ON HOLD | OUTPATIENT
Start: 2022-10-18 | End: 2023-06-28 | Stop reason: HOSPADM

## 2022-10-18 RX ORDER — FUROSEMIDE 80 MG/1
80 TABLET ORAL 2 TIMES DAILY WITH MEALS
Qty: 60 TABLET | Refills: 11 | Status: SHIPPED | OUTPATIENT
Start: 2022-10-18 | End: 2022-10-18 | Stop reason: SDUPTHER

## 2022-10-18 RX ORDER — CALCITRIOL 0.25 UG/1
0.25 CAPSULE ORAL DAILY
Qty: 30 CAPSULE | Refills: 0 | Status: SHIPPED | OUTPATIENT
Start: 2022-10-19 | End: 2022-11-16 | Stop reason: SDUPTHER

## 2022-10-18 RX ORDER — SEVELAMER CARBONATE 800 MG/1
1600 TABLET, FILM COATED ORAL
Qty: 180 TABLET | Refills: 11 | Status: SHIPPED | OUTPATIENT
Start: 2022-10-18 | End: 2023-10-18

## 2022-10-18 RX ORDER — METOPROLOL SUCCINATE 50 MG/1
150 TABLET, EXTENDED RELEASE ORAL DAILY
Qty: 90 TABLET | Refills: 11 | Status: ON HOLD | OUTPATIENT
Start: 2022-10-19 | End: 2023-06-28 | Stop reason: SDUPTHER

## 2022-10-18 RX ORDER — METOLAZONE 5 MG/1
5 TABLET ORAL DAILY
Qty: 30 TABLET | Refills: 11 | Status: SHIPPED | OUTPATIENT
Start: 2022-10-19 | End: 2022-10-18 | Stop reason: HOSPADM

## 2022-10-18 RX ORDER — HYDRALAZINE HYDROCHLORIDE 100 MG/1
100 TABLET, FILM COATED ORAL 3 TIMES DAILY
Qty: 90 TABLET | Refills: 11 | Status: SHIPPED | OUTPATIENT
Start: 2022-10-18 | End: 2023-10-18

## 2022-10-18 RX ORDER — CALCIUM CARBONATE 200(500)MG
1000 TABLET,CHEWABLE ORAL
Qty: 180 TABLET | Refills: 11 | Status: SHIPPED | OUTPATIENT
Start: 2022-10-18 | End: 2023-10-18

## 2022-10-18 RX ORDER — ACETAMINOPHEN 500 MG
5000 TABLET ORAL DAILY
Qty: 30 TABLET | Refills: 0 | Status: SHIPPED | OUTPATIENT
Start: 2022-10-19 | End: 2023-01-11

## 2022-10-18 RX ORDER — METOLAZONE 2.5 MG/1
5 TABLET ORAL DAILY
Status: DISCONTINUED | OUTPATIENT
Start: 2022-10-19 | End: 2022-10-18 | Stop reason: HOSPADM

## 2022-10-18 RX ORDER — METOPROLOL SUCCINATE 50 MG/1
150 TABLET, EXTENDED RELEASE ORAL DAILY
Status: DISCONTINUED | OUTPATIENT
Start: 2022-10-18 | End: 2022-10-18 | Stop reason: HOSPADM

## 2022-10-18 RX ORDER — CLONIDINE HYDROCHLORIDE 0.1 MG/1
0.1 TABLET ORAL 3 TIMES DAILY
Qty: 90 TABLET | Refills: 11 | OUTPATIENT
Start: 2022-10-18 | End: 2022-10-18 | Stop reason: SDUPTHER

## 2022-10-18 RX ORDER — CLONIDINE HYDROCHLORIDE 0.1 MG/1
0.1 TABLET ORAL 3 TIMES DAILY
Qty: 90 TABLET | Refills: 11 | Status: SHIPPED | OUTPATIENT
Start: 2022-10-18 | End: 2022-11-15 | Stop reason: SDUPTHER

## 2022-10-18 RX ADMIN — CALCIUM CARBONATE (ANTACID) CHEW TAB 500 MG 1000 MG: 500 CHEW TAB at 08:10

## 2022-10-18 RX ADMIN — POTASSIUM CHLORIDE 20 MEQ: 1500 TABLET, EXTENDED RELEASE ORAL at 08:10

## 2022-10-18 RX ADMIN — CALCITRIOL CAPSULES 0.25 MCG 0.25 MCG: 0.25 CAPSULE ORAL at 08:10

## 2022-10-18 RX ADMIN — FUROSEMIDE 80 MG: 10 INJECTION, SOLUTION INTRAMUSCULAR; INTRAVENOUS at 12:10

## 2022-10-18 RX ADMIN — METOLAZONE 5 MG: 2.5 TABLET ORAL at 08:10

## 2022-10-18 RX ADMIN — SEVELAMER CARBONATE 1600 MG: 800 TABLET, FILM COATED ORAL at 11:10

## 2022-10-18 RX ADMIN — METOPROLOL SUCCINATE 150 MG: 50 TABLET, EXTENDED RELEASE ORAL at 08:10

## 2022-10-18 RX ADMIN — CALCIUM CARBONATE (ANTACID) CHEW TAB 500 MG 1000 MG: 500 CHEW TAB at 11:10

## 2022-10-18 RX ADMIN — CLONIDINE HYDROCHLORIDE 0.1 MG: 0.1 TABLET ORAL at 08:10

## 2022-10-18 RX ADMIN — AMLODIPINE BESYLATE 10 MG: 5 TABLET ORAL at 08:10

## 2022-10-18 RX ADMIN — SEVELAMER CARBONATE 1600 MG: 800 TABLET, FILM COATED ORAL at 08:10

## 2022-10-18 RX ADMIN — ISOSORBIDE MONONITRATE 120 MG: 60 TABLET, EXTENDED RELEASE ORAL at 08:10

## 2022-10-18 RX ADMIN — CHOLECALCIFEROL TAB 125 MCG (5000 UNIT) 5000 UNITS: 125 TAB at 08:10

## 2022-10-18 RX ADMIN — HEPARIN SODIUM 5000 UNITS: 5000 INJECTION INTRAVENOUS; SUBCUTANEOUS at 06:10

## 2022-10-18 NOTE — PLAN OF CARE
Pt cleared for discharge from case management    PCP appt w/ Makenna Hooper for 10/24 at 7am, on avs.  DEANGELO called Dr. Madrid, Neph's office [phone] and Oz stated pt must make own appt, would not allow SW to schedule appt for pt.     Pt has no HH preference.  DEANGELO sent medical records and HH order to [facility] via [employer].     10/18/22 1225   Final Note   Assessment Type Final Discharge Note   Anticipated Discharge Disposition Home-Health   What phone number can be called within the next 1-3 days to see how you are doing after discharge?   ([phone])   Hospital Resources/Appts/Education Provided Appointments scheduled and added to AVS   Post-Acute Status   Post-Acute Authorization Home Health   Home Health Status Referrals Sent

## 2022-10-18 NOTE — CONSULTS
[facility]  Cardiology  Progress Note    Patient Name: João Ham  MRN: [id number]  Admission Date: 10/10/2022  Hospital Length of Stay: 8 days  Code Status: Full Code   Attending Physician: Mayito Chase MD   Primary Care Physician: Makenna Hooper PA-C  Expected Discharge Date: 10/19/2022  Principal Problem:Acute renal failure    Subjective:     Hospital Course:  Patient has long history of hypertension in EKGs demonstrate LVH  He is being discharged today on multiple meds  Blood pressure 170/100  Interval History:  Renal biopsy is pending  BUN creatinine still elevated    ROS  Objective:     Vital Signs (Most Recent):  Temp: 97.8 °F (36.6 °C) (10/18/22 1151)  Pulse: 74 (10/18/22 1151)  Resp: 18 (10/18/22 1151)  BP: (!) 142/92 (10/18/22 1151)  SpO2: 97 % (10/18/22 1151)   Vital Signs (24h Range):  Temp:  [97.6 °F (36.4 °C)-98 °F (36.7 °C)] 97.8 °F (36.6 °C)  Pulse:  [71-84] 74  Resp:  [16-18] 18  SpO2:  [94 %-100 %] 97 %  BP: (140-164)/(83-99) 142/92     Weight: (!) 140.6 kg (309 lb 15.5 oz)  Body mass index is 39.8 kg/m².    SpO2: 97 %  O2 Device (Oxygen Therapy): room air      Intake/Output Summary (Last 24 hours) at 10/18/2022 1317  Last data filed at 10/18/2022 0856  Gross per 24 hour   Intake 600 ml   Output 4375 ml   Net -3775 ml       Lines/Drains/Airways       Peripheral Intravenous Line  Duration                  Peripheral IV - Single Lumen 10/10/22 1805 20 G Left;Posterior Hand 7 days                    Physical Exam lungs clear  Cardiac regular  Extremities minimal edema    Significant Labs: CMP   Recent Labs   Lab 10/17/22  0513 10/18/22  0558    140   K 4.1 4.0   CL 95 95   CO2 31* 32*   GLU 97 94   BUN 76* 79*   CREATININE 6.7* 6.8*   CALCIUM 8.6* 9.0   PROT 6.2 6.3   ALBUMIN 2.7* 2.7*   BILITOT 0.3 0.3   ALKPHOS 71 64   AST 13 11   ALT 31 25   ANIONGAP 13 13       Significant Imaging:   Assessment and Plan:   Chronic hypertension left ventricle hypertrophy and renal  failure  Renal failure secondary to nephrosclerosis longstanding hypertension and possibly nephritis  Recommend when he is discharged consider using hydralazine instead of amlodipine and increase clonidine 0.2 b.i.d.  He will follow-up with Nephrology and will be glad to see him in the office for cardiovascular follow-up  Brief HPI:     Active Diagnoses:    Diagnosis Date Noted POA    PRINCIPAL PROBLEM:  Acute renal failure [N17.9] 10/10/2022 Yes    Severe obesity (BMI >= 40) [E66.01] 10/17/2022 Yes    Malignant hypertensive urgency [I16.0] 10/10/2022 Yes    Anemia [D64.9] 10/10/2022 Yes    Hypoalbuminemia [E88.09] 10/10/2022 Yes    Obesity (BMI 30-39.9) [E66.9] 10/10/2022 Yes    Elevated troponin [R77.8] 10/10/2022 Yes    Acute heart failure with preserved ejection fraction [I50.31] 10/10/2022 Yes      Problems Resolved During this Admission:    Diagnosis Date Noted Date Resolved POA    Hypokalemia [E87.6] 10/12/2022 10/13/2022 No    Hypocalcemia [E83.51] 10/10/2022 10/17/2022 Yes    Acidosis, metabolic [E87.20] 10/10/2022 10/13/2022 Yes    Elevated LFTs [R79.89] 10/10/2022 10/14/2022 Yes       VTE Risk Mitigation (From admission, onward)           Ordered     heparin (porcine) injection 5,000 Units  Every 8 hours         10/10/22 1955     IP VTE HIGH RISK PATIENT  Once         10/10/22 1955     Place sequential compression device  Until discontinued         10/10/22 1955                    Warren Shin MD  Cardiology  [facility]

## 2022-10-18 NOTE — PLAN OF CARE
Recommendations   1) Continue renal diet with fluid restriction   2) weigh daily or as needed   3) Nutrition education and handout given    Goals: 1) PO intakes > 75% of meals at f/u  Nutrition Goal Status: new  Communication of RD Recs:  (POC, sticky note)

## 2022-10-18 NOTE — CARE UPDATE
10/17/22 1930   Patient Assessment/Suction   Level of Consciousness (AVPU) alert   Respiratory Effort Unlabored   PRE-TX-O2   O2 Device (Oxygen Therapy) room air   SpO2 96 %   Pulse Oximetry Type Intermittent   $ Pulse Oximetry - Multiple Charge Pulse Oximetry - Multiple   Pulse 84   Resp 17

## 2022-10-18 NOTE — DISCHARGE INSTRUCTIONS
Discharge Instructions, [facility] Medicine    Thank you for choosing [facility] for your medical care. The primary doctor who is taking care of you at the time of your discharge is Mayito Chase MD.     You were admitted to the hospital with Acute renal failure.     Please note your discharge instructions, including diet/activity restrictions, follow-up appointments, and medication changes.  If you have any questions about your medical issues, prescriptions, or any other questions, please feel free to contact the [facility] Medicine Dept at [phone] and we will help.    If you are previously with Home health, outpatient PT/OT or under a therapy program, you are cleared to return to those programs.    Please direct all long term medication refills and follow up to your primary care provider, Makenna Hooper PA-C. Thank you again for letting us take care of your health care needs.

## 2022-10-18 NOTE — PLAN OF CARE
Plan of care reviewed with pt at beginning of shift.  Pt/family verbalized understanding. Purposeful rounds completed throughout shift.  Pain monitored (medicated when/tolerated), restroom offered, repositions___, safety maintained.  Patient has remained free from fall/injury, no skin breakdown noted.  Side rails up x2, bed in locked and lowest position, call light kept within reach.      Problem: Adult Inpatient Plan of Care  Goal: Plan of Care Review  Outcome: Ongoing, Progressing  Goal: Patient-Specific Goal (Individualized)  Outcome: Ongoing, Progressing  Goal: Absence of Hospital-Acquired Illness or Injury  Outcome: Ongoing, Progressing  Goal: Optimal Comfort and Wellbeing  Outcome: Ongoing, Progressing  Goal: Readiness for Transition of Care  Outcome: Ongoing, Progressing     Problem: Fluid and Electrolyte Imbalance (Acute Kidney Injury/Impairment)  Goal: Fluid and Electrolyte Balance  Outcome: Ongoing, Progressing     Problem: Oral Intake Inadequate (Acute Kidney Injury/Impairment)  Goal: Optimal Nutrition Intake  Outcome: Ongoing, Progressing     Problem: Fall Injury Risk  Goal: Absence of Fall and Fall-Related Injury  Outcome: Ongoing, Progressing     Problem: Pain Acute  Goal: Acceptable Pain Control and Functional Ability  Outcome: Ongoing, Progressing

## 2022-10-18 NOTE — CARE UPDATE
10/18/22 0735   Patient Assessment/Suction   Level of Consciousness (AVPU) alert   PRE-TX-O2   O2 Device (Oxygen Therapy) room air   SpO2 100 %   Pulse Oximetry Type Intermittent   $ Pulse Oximetry - Multiple Charge Pulse Oximetry - Multiple

## 2022-10-18 NOTE — PROGRESS NOTES
"[facility]  Adult Nutrition  Progress Note    SUMMARY       Recommendations   1) Continue renal diet with fluid restriction   2) weigh daily or as needed   3) Nutrition education and handout given    Goals: 1) PO intakes > 75% of meals at f/u  Nutrition Goal Status: new  Communication of RD Recs:  (POC, sticky note)    Assessment and Plan    Nutrition related knowledge deficit  R/t GORGE  As evidenced by asking nutrition related questions  Intervention: nutrition education and Phos/Na/K/fluid modified diet  new     Malnutrition Assessment     Skin (Micronutrient):  (Trung = 20)  Teeth (Micronutrient):  (WDL)   Micronutrient Evaluation: suspected toxicity  Micronutrient Evaluation Comments: Phos               Edema (Fluid Accumulation): 3-->moderate   Subcutaneous Fat Loss (Final Summary): well nourished  Muscle Loss Evaluation (Final Summary): well nourished         Reason for Assessment    Reason For Assessment: length of stay  Diagnosis:  (acute renal failure)  Relevant Medical History: HTN, obesity, anemia  Interdisciplinary Rounds: did not attend    General Information Comments: 41 y/o male admitted with GORGE and CHF exacerbation. Pt does avoid salt at home and read labels for sodium, I reviewed how to do this correctly and educated pt on renal diet/fluid needs. NFPE WDL 10/18/22. No significant wt loss per chart review. Eating well this admission.    Nutrition Discharge Planning: To be determined- renal, low sodium diet + 1500 ml fluid restriction    Nutrition Risk Screen    Nutrition Risk Screen: no indicators present    Nutrition/Diet History    Patient Reported Diet/Restrictions/Preferences: low salt  Spiritual, Cultural Beliefs, Baptist Practices, Values that Affect Care: no  Food Allergies: NKFA  Factors Affecting Nutritional Intake: None identified at this time    Anthropometrics    Temp: 97.8 °F (36.6 °C)  Height Method: Stated  Height: 6' 2"  Height (inches): 74 in  Weight Method: " Bed Scale  Weight: (!) 140.6 kg (309 lb 15.5 oz)  Weight (lb): (!) 309.97 lb  Ideal Body Weight (IBW), Male: 190 lb  % Ideal Body Weight, Male (lb): 163.14 %  BMI (Calculated): 39.8  BMI Grade: 35 - 39.9 - obesity - grade II       Lab/Procedures/Meds    Pertinent Labs Reviewed: reviewed  BMP  Lab Results   Component Value Date     10/18/2022    K 4.0 10/18/2022    CL 95 10/18/2022    CO2 32 (H) 10/18/2022    BUN 79 (H) 10/18/2022    CREATININE 6.8 (H) 10/18/2022    CALCIUM 9.0 10/18/2022    ANIONGAP 13 10/18/2022     Lab Results   Component Value Date    CALCIUM 9.0 10/18/2022    PHOS 6.3 (H) 10/18/2022     BNP  No results for input(s): BNP, BNPTRIAGEBLO in the last 168 hours.    Pertinent Medications Reviewed: reviewed  Pertinent Medications Comments: calcitriol, lasix, KCl    Estimated/Assessed Needs    Weight Used For Calorie Calculations: 84 kg (185 lb 3 oz) (standard BW)  Energy Calorie Requirements (kcal): 25-30 kcal/lg ( GORGE vs. obsity) = 3790-3748 kcal  Energy Need Method: Kcal/kg  Protein Requirements: 0.8-1 g protein/kg ( GORGE/obesity) = 67-84 g  Weight Used For Protein Calculations: 84 kg (185 lb 3 oz)  Fluid Requirements (mL): 1500 ml per MD  Estimated Fluid Requirement Method: other (see comments)  CHO Requirement: N/A      Nutrition Prescription Ordered    Current Diet Order: renal diet + 1500 ml fluid restriction    Evaluation of Received Nutrient/Fluid Intake    Energy Calories Required: meeting needs  Protein Required: meeting needs  Fluid Required: meeting needs  Tolerance: tolerating     Intake/Output Summary (Last 24 hours) at 10/18/2022 1224  Last data filed at 10/18/2022 0856  Gross per 24 hour   Intake 840 ml   Output 4800 ml   Net -3960 ml       % Intake of Estimated Energy Needs: %  % Meal Intake: %    Nutrition Risk    Level of Risk/Frequency of Follow-up: low - moderate 1 x weekly    Monitor and Evaluation    Food and Nutrient Intake: energy intake, food and beverage  intake  Food and Nutrient Adminstration: diet order  Knowledge/Beliefs/Attitudes: food and nutrition knowledge/skill  Anthropometric Measurements: weight  Biochemical Data, Medical Tests and Procedures: electrolyte and renal panel  Nutrition-Focused Physical Findings: overall appearance     Nutrition Follow-Up    RD Follow-up?: Yes

## 2022-10-18 NOTE — PT/OT/SLP PROGRESS
Physical Therapy Treatment    Patient Name:  João Ham   MRN:  [id number]    Recommendations:     Discharge Recommendations:  home   Discharge Equipment Recommendations: none   Barriers to discharge: None    Assessment:     João Ham is a 42 y.o. male admitted with a medical diagnosis of Acute renal failure.  He presents with the following impairments/functional limitations:  weakness, impaired endurance, gait instability, impaired functional mobility.  With third attempt, pt ambulated 250' with no AD, SB-CGA, no LOB, and no excessive fatigue. Remained up in chair with lunch tray set up.     Rehab Prognosis: Good; patient would benefit from acute skilled PT services to address these deficits and reach maximum level of function.    Recent Surgery: * No surgery found *      Plan:     During this hospitalization, patient to be seen 6 x/week to address the identified rehab impairments via gait training, therapeutic activities, therapeutic exercises and progress toward the following goals:    Plan of Care Expires:  10/31/22    Subjective     Chief Complaint: had to fill out my short-term disability form, and it took a while (re: deferring first attempt at PT)  Patient/Family Comments/goals: none expressed  Pain/Comfort:  Pain Rating 1: 0/10      Objective:     Communicated with nurse Vaughn prior to session.  Patient found sitting edge of bed with telemetry, peripheral IV upon PT entry to room.     General Precautions: Standard, fall   Orthopedic Precautions:N/A   Braces: N/A  Respiratory Status: Room air     Functional Mobility:  Transfers:     Sit to Stand:  modified independence with no AD  Gait: 250' with no AD, SB-CGA, no LOB or c/o fatigue      AM-PAC 6 CLICK MOBILITY          Treatment & Education:    Patient left up in chair with all lines intact, call button in reach, chair alarm on, and nursing staff notified..    GOALS:   Multidisciplinary Problems       Physical Therapy Goals          Problem: Physical  Therapy    Goal Priority Disciplines Outcome Goal Variances Interventions   Physical Therapy Goal     PT, PT/OT Ongoing, Progressing     Description: Goals to be met by: 10/31/2022     Patient will increase functional independence with mobility by performin). Supine to sit with Modified [place]  2). Sit to supine with Modified [place]  3). Sit to stand transfer with Modified [place]  4). Gait  x > 100 feet with Modified [place].                          Time Tracking:     PT Received On: 10/18/22  PT Start Time: 1147     PT Stop Time: 1157  PT Total Time (min): 10 min     Billable Minutes: Gait Training 10    Treatment Type: Treatment  PT/PTA: PTA     PTA Visit Number: 1     10/18/2022

## 2022-10-18 NOTE — NURSING
IV and tele removed. Orders reviewed. Prescriptions sent to pharmacy. Family here. Pt escorted to car via w/c pt drove here. Pt d/c home

## 2022-10-18 NOTE — PROGRESS NOTES
Nephrology Progress Note        Patient Name: João Ham  MRN: [id number]    Patient Class: IP- Inpatient   Admission Date: 10/10/2022  Length of Stay: 8 days  Date of Service: 10/18/2022    Attending Physician: Mayito Chase MD  Primary Care Provider: Makenna Hooper PA-C    Reason for Consult: clarke/ckd/uremia/acidosis/chf/htn/anemia    SUBJECTIVE:     HPI: 42M with little previous follow-up but a PMHx of treated HTN presents to the ED with progressive leg swelling, scrotal swelling, abdominal distention, and SOB over approximately 2 weeks with 1 episode of racing heart. Patient reports he has a good walk from his vehicle to his office at work and notes he had to stop to catch his breath. He denies prior similar episodes. He stopped his Amlodipine, Valsartin and HCTZ a week ago because he thought it was contributing to his swelling. Per my discussion with Dr. Fox, agree with cards eval and diuretic due to uncontrolled HTN,as well as renal US and UA. I added Phos, Mg, PTH, am vitamin D levels in AM to evaluate further.    10/11 VSS. Renal US is unremarkable, UA is bland. Vitamin D is low and PTH is high, add oral D3 and oral calcium supplement. Agree with cards recommendations and med changes.    10/12 VSS. Will quantify proteinuria. Etiology of such advanced CKD is unclear. Not sure that there is anything treatable, but a renal biopsy maybe important from prognostic stand point in this young man with possible dialysis and transplantation in the future. Will discuss with patient, hold blood thinners. Relax K restriction, give oral KCL. Continue diuretics.    10/13 VSS, sCr better, BP still high. Continu Lasix, add metolazone. Postpone kidney biopsy, re-evaluate in AM.    10/14 VSS, no new complains, edema better. Re-evaluate in AM. Postpone kidney biopsy for next week or outpatient.    10/15  UOP 1.3L, pressures much improved.  Renal function not improving.  No s/sx uremia, no complaints other than not sleeping  well.      10/16  UOP 2.7L, BP controlled.  Renal function same.  PO4 creeping up, calcitriol added yesterday.  Will increase renvela to 2 tabs w/meals.  Complements normal.  No new complaints.    10/17 VSS, on 1L NC, UOP 4.2L, net -25L, went for renal biopsy with no issues, appetite is good, still with edema but better    10/18 VSS, on RA, UOP 5.3L, net -29L, no complaints    Outpatient meds:  No current facility-administered medications on file prior to encounter.     Current Outpatient Medications on File Prior to Encounter   Medication Sig Dispense Refill    [DISCONTINUED] amlodipine-valsartan (EXFORGE)  mg per tablet Take 1 tablet by mouth once daily.      [DISCONTINUED] hydroCHLOROthiazide (HYDRODIURIL) 25 MG tablet Take 25 mg by mouth once daily.         Scheduled meds:   amLODIPine  10 mg Oral Daily    calcitRIOL  0.25 mcg Oral Daily    calcium carbonate  1,000 mg Oral TID WM    cholecalciferol (vitamin D3)  5,000 Units Oral Daily    cloNIDine  0.1 mg Oral BID    furosemide (LASIX) injection  80 mg Intravenous Q12H    heparin (porcine)  5,000 Units Subcutaneous Q8H    isosorbide mononitrate  120 mg Oral Daily    LIDOcaine  1 patch Transdermal Q24H    melatonin  6 mg Oral Nightly    [START ON 10/19/2022] metOLazone  5 mg Oral Daily    metoprolol succinate  150 mg Oral Daily    potassium chloride  20 mEq Oral BID    sevelamer carbonate  1,600 mg Oral TID WM       Infusions:        PRN meds:      Review of Systems:  Neg    OBJECTIVE:     Vital Signs and IO:  Temp:  [97.6 °F (36.4 °C)-98 °F (36.7 °C)]   Pulse:  [71-84]   Resp:  [16-18]   BP: (140-164)/(83-99)   SpO2:  [94 %-100 %]   I/O last 3 completed shifts:  In: 480 [P.O.:480]  Out: 7400 [Urine:7400]  Wt Readings from Last 5 Encounters:   10/18/22 (!) 140.6 kg (309 lb 15.5 oz)     Body mass index is 39.8 kg/m².    Physical Exam  Constitutional:       General: He is not in acute distress.     Appearance: He is well-developed. He is not diaphoretic.    HENT:      Head: Normocephalic and atraumatic.      Mouth/Throat:      Mouth: Mucous membranes are moist.   Eyes:      General: No scleral icterus.     Pupils: Pupils are equal, round, and reactive to light.   Cardiovascular:      Rate and Rhythm: Normal rate and regular rhythm.   Pulmonary:      Effort: Pulmonary effort is normal. No respiratory distress.      Breath sounds: No stridor.   Abdominal:      General: There is distension.      Palpations: Abdomen is soft.   Musculoskeletal:         General: Swelling present. No deformity. Normal range of motion.      Cervical back: Neck supple.   Skin:     General: Skin is warm and dry.      Findings: No erythema or rash.   Neurological:      Mental Status: He is alert and oriented to person, place, and time.      Cranial Nerves: No cranial nerve deficit.   Psychiatric:         Behavior: Behavior normal.     Laboratory:  Recent Labs   Lab 10/16/22  0508 10/17/22  0513 10/18/22  0558    139 140   K 3.9 4.1 4.0   CL 94* 95 95   CO2 30* 31* 32*   BUN 70* 76* 79*   CREATININE 6.6* 6.7* 6.8*   GLU 92 97 94         Recent Labs   Lab 10/16/22  0508 10/17/22  0513 10/18/22  0558   CALCIUM 7.9* 8.6* 9.0   ALBUMIN 2.6* 2.7* 2.7*   PHOS 6.1* 5.9* 6.3*   MG 1.9 2.0 2.0         Recent Labs   Lab 10/10/22  1452 10/10/22  2005   PTH, Intact 632.4 H 517.7 H         No results for input(s): POCTGLUCOSE in the last 168 hours.    Recent Labs   Lab 10/10/22  2005   Hemoglobin A1C 5.5         Recent Labs   Lab 10/16/22  0508 10/17/22  0513 10/18/22  0558   WBC 5.79 5.25 5.13   HGB 8.9* 8.7* 9.3*   HCT 28.1* 27.4* 28.8*    335 351   MCV 88 88 88   MCHC 31.7* 31.8* 32.3   MONO 17.3*  1.0 17.3*  0.9 21.6*  1.1*         Recent Labs   Lab 10/16/22  0508 10/17/22  0513 10/18/22  0558   BILITOT 0.3 0.3 0.3   PROT 6.1 6.2 6.3   ALBUMIN 2.6* 2.7* 2.7*   ALKPHOS 67 71 64   ALT 37 31 25   AST 15 13 11         Recent Labs   Lab 10/10/22  1645   Color, UA Yellow   Appearance, UA Clear    pH, UA 6.0   Specific Gravity, UA 1.025   Protein, UA 2+ A   Glucose, UA Negative   Ketones, UA Negative   Urobilinogen, UA Negative   Bilirubin (UA) Negative   Occult Blood UA 1+ A   Nitrite, UA Negative   RBC, UA 1   WBC, UA 0   Bacteria None   Hyaline Casts, UA 0               Microbiology Results (last 7 days)       ** No results found for the last 168 hours. 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 HTN nephrosclerosis or FSGS- awaiting a renal biopsy  - renal function is stable  - nonoliguric and diuretic responsive  - no acute clearance/volume removal needs to warrant RRT now but in the near future  - awaiting renal biopsy results  - no nsaids or IV contrast  - dose meds for CrCl < 10  - renal panel in 1 week and f/u in 1-2 weeks    HTN  CHF  Hypokalemia  - clinically improved- almost net -29L for the admission  - more alkalotic- switch to PO lasix 80mg BID  - continue metolazone 5mg daily  - hold ARB  - continue standing KCl supplement  - renal diet, 1.5L fluid restriction    Anemia of CKD  - will set up for LOR as outpatient    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   We will follow the patient and provide recommendations as needed.    Patient care time was spent personally by me on the following activities: > 35 min    Obtaining a history.  Examination of patient.  Providing medical care at the patients bedside.  Developing a treatment plan with patient or surrogate and bedside caregivers.  Ordering and reviewing laboratory studies, radiographic studies, pulse oximetry.  Ordering and performing treatments and interventions.  Evaluation of patient's response to treatment.  Discussions with consultants while on the unit and immediately available to the  patient.  Re-evaluation of the patient's condition.  Documentation in the medical record.     Oni Garcia MD      [facility]  [address] [place] [address] [id number] [phone] - tel  [phone] - fax    10/18/2022

## 2022-10-19 ENCOUNTER — PATIENT OUTREACH (OUTPATIENT)
Dept: ADMINISTRATIVE | Facility: CLINIC | Age: 42
End: 2022-10-19
Payer: COMMERCIAL

## 2022-10-19 ENCOUNTER — TELEPHONE (OUTPATIENT)
Dept: MEDSURG UNIT | Facility: HOSPITAL | Age: 42
End: 2022-10-19
Payer: COMMERCIAL

## 2022-10-19 LAB
ANCA AB TITR SER IF: NORMAL TITER
P-ANCA TITR SER IF: NORMAL TITER
PATHOLOGIST INTERPRETATION IFE: NORMAL

## 2022-10-19 PROCEDURE — G0180 MD CERTIFICATION HHA PATIENT: HCPCS | Mod: ,,, | Performed by: PHYSICIAN ASSISTANT

## 2022-10-19 PROCEDURE — G0180 PR HOME HEALTH MD CERTIFICATION: ICD-10-PCS | Mod: ,,, | Performed by: PHYSICIAN ASSISTANT

## 2022-10-19 NOTE — PROGRESS NOTES
C3 nurse spoke with João Ham  for a TCC post hospital discharge follow up call. The patient has a scheduled HOSFU appointment with Makenna Hooper PA-C  on 10/24/22 @ 0700.

## 2022-10-19 NOTE — DISCHARGE SUMMARY
[facility] Medicine  Discharge Summary      Patient Name: João Ham  MRN: [id number]  Patient Class: IP- Inpatient  Admission Date: 10/10/2022  Hospital Length of Stay: 8 days  Discharge Date and Time: 10/18/2022  3:57 PM  Attending Physician: Blanche att. providers found   Discharging Provider: Mayito Chase MD  Primary Care Provider: Makenna Hooper PA-C      HPI:   João Ham is a 42 year old male with a past medical history of HTN, obesity, and anemia who presents with multiple weeks of worsening swelling to the legs and scrotum associated with worsening shortness of breath. The patient thought that the swelling possibly was secondary to his blood pressure medication, so he stopped taking these medications multiple days ago. He denies any chest pain or palpitations. He denies any drug, tobacco or alcohol use. He has never experienced swelling of this severity before. He denies any issues with urination. He does endorse fatigue, changes in appetite and nausea and vomiting. While in the ED, the patient was given IV calcium, Dilaudid, and labetalol. A Cardene infusion was also started. Hospital Medicine was consulted for admission.      * No surgery found *      Hospital Course:   João Ham is a 42 year old male with a past medical history of HTN, obesity, and anemia who presented with multiple weeks of worsening swelling to the legs and scrotum associated with worsening shortness of breath secondary to hypertensive emergency with acute renal failure, demand ischemia and pulmonary edema. He was started on a Cardene infusion for BP control. He was also started on IV Lasix and metolazone diuresis given volume overload. Nephrology and Cardiology have been consulted. A TTE shows grade III diastolic dysfunction. The patient is also hypocalcemic (likely from renal failure); calcium is being repleted as well as vitamin D; calcitriol has also been started.     Complement levels, as well as  anti-GBM and other autoimmune antibodies were negative.  Nephrology was consulted, initially started patient on Cardene drip for which he was weaned off to oral antihypertensives along with the assistance of Cardiology.  Patient underwent renal biopsy on 10/17 without incident.  His blood pressure was controlled at time of discharge.  He was discharged home with home health for help with his multiple medications, as well as follow-up with Nephrology, and primary care.       Goals of Care Treatment Preferences:  Code Status: Full Code      Consults:   Consults (From admission, onward)        Status Ordering Provider     Inpatient consult to Cardiology  Once        Provider:  Dawson Garsia MD    Completed RYDER CONTRERAS     Inpatient consult to Nephrology  Once        Provider:  Jeremy Madrid MD    Completed RYDER CONTRERAS          No new Assessment & Plan notes have been filed under this hospital service since the last note was generated.  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 Go to.    Specialty: Family  Medicine  Why: APPOINTMENT:  October 24, 2022 at 7:00am  Hospital Follow up  Contact information:  [address] [facility] [address]  [phone]             Jeremy Madrid MD. Schedule an appointment as soon as possible for a visit in 1 week(s).    Specialty: Nephrology  Contact information:  [address] [facility]  [facility] [address]  [phone]             [facility] Follow up.    Specialties: Home Health Services, Home Therapy Services, Home Living Aide Services  Why: Home Health  Contact information:  [address] [facility] [address]  [phone]                     Patient Instructions:      Ambulatory referral/consult to Home Health   Standing Status: Future   Referral Priority: Routine Referral Type: Home Health   Referral Reason: Specialty Services Required   Requested Specialty: Home Health Services   Number of Visits Requested: 1     Diet renal     Notify your health care provider if you experience any of the following:  temperature >100.4     Notify your health care provider if you experience any of the following:  persistent nausea and vomiting or diarrhea     Notify your health care provider if you experience any of the following:  severe uncontrolled pain     Activity as tolerated       Significant Diagnostic Studies: Labs:   BMP:   Recent Labs   Lab 10/17/22  0513 10/18/22  0558   GLU 97 94    140   K 4.1 4.0   CL 95 95   CO2 31* 32*   BUN 76* 79*   CREATININE 6.7* 6.8*   CALCIUM 8.6* 9.0   MG 2.0 2.0    and CBC   Recent Labs   Lab 10/17/22  0513 10/18/22  0558   WBC 5.25 5.13   HGB 8.7* 9.3*   HCT 27.4* 28.8*    351       Pending Diagnostic Studies:     Procedure Component Value Units Date/Time    Aldosterone/Renin Activity Ratio [[id number]] Collected: 10/16/22 0508    Order Status: Sent Lab Status: In process Updated: 10/16/22 1024    Specimen: Blood     Anti-Neutrophilic Cytoplasmic Antibody [[id number]] Collected:  10/15/22 0512    Order Status: Sent Lab Status: In process Updated: 10/15/22 0528    Specimen: Blood     EKG 12-lead [[id number]] Collected: 10/13/22 1132    Order Status: Sent Lab Status: In process Updated: 10/14/22 0801    Narrative:      Test Reason : R94.31,    Vent. Rate : 079 BPM     Atrial Rate : 079 BPM     P-R Int : 136 ms          QRS Dur : 096 ms      QT Int : 470 ms       P-R-T Axes : 061 -62 042 degrees     QTc Int : 538 ms    Normal sinus rhythm  Possible Left atrial enlargement  Incomplete right bundle branch block  Left anterior fascicular block  LVH  Cannot rule out Septal infarct ,age undetermined  T wave abnormality, consider anterolateral ischemia  Prolonged QT  Abnormal ECG  When compared with ECG of 10-OCT-2022 13:54,  Incomplete right bundle branch block is now Present  Minimal criteria for Septal infarct are now Present    Referred By: [email or web address]   SELF           Confirmed By:     Specimen to Pathology, Radiology Kidney, Sagola biopsy [[id number]] Collected: 10/17/22 1149    Order Status: Sent Lab Status: In process Updated: 10/17/22 1526         Medications:  Reconciled Home Medications:      Medication List      START taking these medications    calcitRIOL 0.25 MCG Cap  Commonly known as: ROCALTROL  Take 1 capsule (0.25 mcg total) by mouth once daily.  Start taking on: October 19, 2022     calcium carbonate 200 mg calcium (500 mg) chewable tablet  Commonly known as: TUMS  Take 2 tablets (1,000 mg total) by mouth 3 (three) times daily with meals.     cholecalciferol (vitamin D3) 125 mcg (5,000 unit) Tab  Take 1 tablet (5,000 Units total) by mouth once daily.  Start taking on: October 19, 2022     cloNIDine 0.1 MG tablet  Commonly known as: CATAPRES  Take 1 tablet (0.1 mg total) by mouth 3 (three) times daily.     furosemide 80 MG tablet  Commonly known as: LASIX  Take 1 tablet (80 mg total) by mouth 3 (three) times daily with meals.     hydrALAZINE 100 MG tablet  Commonly known as:  APRESOLINE  Take 1 tablet (100 mg total) by mouth 3 (three) times daily.     isosorbide mononitrate 120 MG 24 hr tablet  Commonly known as: IMDUR  Take 1 tablet (120 mg total) by mouth once daily.  Start taking on: October 19, 2022     metoprolol succinate 50 MG 24 hr tablet  Commonly known as: TOPROL-XL  Take 3 tablets (150 mg total) by mouth once daily.  Start taking on: October 19, 2022     sevelamer carbonate 800 mg Tab  Commonly known as: RENVELA  Take 2 tablets (1,600 mg total) by mouth 3 (three) times daily with meals.        STOP taking these medications    amlodipine-valsartan  mg per tablet  Commonly known as: EXFORGE     hydroCHLOROthiazide 25 MG tablet  Commonly known as: HYDRODIURIL            Indwelling Lines/Drains at time of discharge:   Lines/Drains/Airways     None                 Time spent on the discharge of patient:  37 minutes         Mayito Chase MD  Department of Hospital Medicine  [facility]

## 2022-10-20 LAB
ALDOST SERPL-MCNC: 67.5 NG/DL
ALDOST/RENIN PLAS-RTO: 6.6 RATIO
RENIN PLAS-CCNC: 10.2 NG/ML/HR

## 2022-10-24 ENCOUNTER — TELEPHONE (OUTPATIENT)
Dept: FAMILY MEDICINE | Facility: CLINIC | Age: 42
End: 2022-10-24

## 2022-10-24 ENCOUNTER — OFFICE VISIT (OUTPATIENT)
Dept: FAMILY MEDICINE | Facility: CLINIC | Age: 42
End: 2022-10-24
Payer: COMMERCIAL

## 2022-10-24 ENCOUNTER — LAB VISIT (OUTPATIENT)
Dept: LAB | Facility: HOSPITAL | Age: 42
End: 2022-10-24
Attending: PHYSICIAN ASSISTANT
Payer: COMMERCIAL

## 2022-10-24 VITALS
TEMPERATURE: 98 F | HEART RATE: 76 BPM | HEIGHT: 74 IN | OXYGEN SATURATION: 98 % | RESPIRATION RATE: 17 BRPM | DIASTOLIC BLOOD PRESSURE: 90 MMHG | SYSTOLIC BLOOD PRESSURE: 150 MMHG | BODY MASS INDEX: 34.97 KG/M2 | WEIGHT: 272.5 LBS

## 2022-10-24 DIAGNOSIS — N17.9 ACUTE RENAL FAILURE, UNSPECIFIED ACUTE RENAL FAILURE TYPE: ICD-10-CM

## 2022-10-24 DIAGNOSIS — I13.0 HYPERTENSIVE HEART AND RENAL DISEASE WITH CONGESTIVE HEART FAILURE: Primary | ICD-10-CM

## 2022-10-24 DIAGNOSIS — I16.0 MALIGNANT HYPERTENSIVE URGENCY: ICD-10-CM

## 2022-10-24 DIAGNOSIS — T78.40XA ALLERGY, INITIAL ENCOUNTER: ICD-10-CM

## 2022-10-24 DIAGNOSIS — I50.31 ACUTE HEART FAILURE WITH PRESERVED EJECTION FRACTION: ICD-10-CM

## 2022-10-24 DIAGNOSIS — Z09 HOSPITAL DISCHARGE FOLLOW-UP: Primary | ICD-10-CM

## 2022-10-24 DIAGNOSIS — E66.9 OBESITY (BMI 30-39.9): ICD-10-CM

## 2022-10-24 DIAGNOSIS — I51.89 DIASTOLIC DYSFUNCTION: ICD-10-CM

## 2022-10-24 DIAGNOSIS — J90 PLEURAL EFFUSION, RIGHT: ICD-10-CM

## 2022-10-24 PROBLEM — E66.01 SEVERE OBESITY (BMI >= 40): Status: RESOLVED | Noted: 2022-10-17 | Resolved: 2022-10-24

## 2022-10-24 LAB
ANION GAP SERPL CALC-SCNC: 15 MMOL/L (ref 8–16)
BASOPHILS # BLD AUTO: 0.02 K/UL (ref 0–0.2)
BASOPHILS NFR BLD: 0.4 % (ref 0–1.9)
BUN SERPL-MCNC: 90 MG/DL (ref 6–20)
CALCIUM SERPL-MCNC: 8.6 MG/DL (ref 8.7–10.5)
CHLORIDE SERPL-SCNC: 100 MMOL/L (ref 95–110)
CO2 SERPL-SCNC: 27 MMOL/L (ref 23–29)
CREAT SERPL-MCNC: 7.6 MG/DL (ref 0.5–1.4)
DIFFERENTIAL METHOD: ABNORMAL
EOSINOPHIL # BLD AUTO: 0.1 K/UL (ref 0–0.5)
EOSINOPHIL NFR BLD: 2.5 % (ref 0–8)
ERYTHROCYTE [DISTWIDTH] IN BLOOD BY AUTOMATED COUNT: 15.3 % (ref 11.5–14.5)
EST. GFR  (NO RACE VARIABLE): 9 ML/MIN/1.73 M^2
GLUCOSE SERPL-MCNC: 99 MG/DL (ref 70–110)
HCT VFR BLD AUTO: 29.7 % (ref 40–54)
HGB BLD-MCNC: 9.5 G/DL (ref 14–18)
IMM GRANULOCYTES # BLD AUTO: 0.01 K/UL (ref 0–0.04)
IMM GRANULOCYTES NFR BLD AUTO: 0.2 % (ref 0–0.5)
LYMPHOCYTES # BLD AUTO: 1.3 K/UL (ref 1–4.8)
LYMPHOCYTES NFR BLD: 24.2 % (ref 18–48)
MCH RBC QN AUTO: 28.3 PG (ref 27–31)
MCHC RBC AUTO-ENTMCNC: 32 G/DL (ref 32–36)
MCV RBC AUTO: 88 FL (ref 82–98)
MONOCYTES # BLD AUTO: 0.7 K/UL (ref 0.3–1)
MONOCYTES NFR BLD: 14 % (ref 4–15)
NEUTROPHILS # BLD AUTO: 3.1 K/UL (ref 1.8–7.7)
NEUTROPHILS NFR BLD: 58.7 % (ref 38–73)
NRBC BLD-RTO: 0 /100 WBC
PLATELET # BLD AUTO: 358 K/UL (ref 150–450)
PMV BLD AUTO: 10.9 FL (ref 9.2–12.9)
POTASSIUM SERPL-SCNC: 4.4 MMOL/L (ref 3.5–5.1)
RBC # BLD AUTO: 3.36 M/UL (ref 4.6–6.2)
SODIUM SERPL-SCNC: 142 MMOL/L (ref 136–145)
WBC # BLD AUTO: 5.2 K/UL (ref 3.9–12.7)

## 2022-10-24 PROCEDURE — 99999 PR PBB SHADOW E&M-EST. PATIENT-LVL V: CPT | Mod: PBBFAC,,, | Performed by: PHYSICIAN ASSISTANT

## 2022-10-24 PROCEDURE — 99999 PR PBB SHADOW E&M-EST. PATIENT-LVL V: ICD-10-PCS | Mod: PBBFAC,,, | Performed by: PHYSICIAN ASSISTANT

## 2022-10-24 PROCEDURE — 3077F PR MOST RECENT SYSTOLIC BLOOD PRESSURE >= 140 MM HG: ICD-10-PCS | Mod: CPTII,S$GLB,, | Performed by: PHYSICIAN ASSISTANT

## 2022-10-24 PROCEDURE — 85025 COMPLETE CBC W/AUTO DIFF WBC: CPT | Performed by: PHYSICIAN ASSISTANT

## 2022-10-24 PROCEDURE — 3077F SYST BP >= 140 MM HG: CPT | Mod: CPTII,S$GLB,, | Performed by: PHYSICIAN ASSISTANT

## 2022-10-24 PROCEDURE — 3044F PR MOST RECENT HEMOGLOBIN A1C LEVEL <7.0%: ICD-10-PCS | Mod: CPTII,S$GLB,, | Performed by: PHYSICIAN ASSISTANT

## 2022-10-24 PROCEDURE — 1159F MED LIST DOCD IN RCRD: CPT | Mod: CPTII,S$GLB,, | Performed by: PHYSICIAN ASSISTANT

## 2022-10-24 PROCEDURE — 1159F PR MEDICATION LIST DOCUMENTED IN MEDICAL RECORD: ICD-10-PCS | Mod: CPTII,S$GLB,, | Performed by: PHYSICIAN ASSISTANT

## 2022-10-24 PROCEDURE — 1160F RVW MEDS BY RX/DR IN RCRD: CPT | Mod: CPTII,S$GLB,, | Performed by: PHYSICIAN ASSISTANT

## 2022-10-24 PROCEDURE — 1160F PR REVIEW ALL MEDS BY PRESCRIBER/CLIN PHARMACIST DOCUMENTED: ICD-10-PCS | Mod: CPTII,S$GLB,, | Performed by: PHYSICIAN ASSISTANT

## 2022-10-24 PROCEDURE — 3044F HG A1C LEVEL LT 7.0%: CPT | Mod: CPTII,S$GLB,, | Performed by: PHYSICIAN ASSISTANT

## 2022-10-24 PROCEDURE — 3080F PR MOST RECENT DIASTOLIC BLOOD PRESSURE >= 90 MM HG: ICD-10-PCS | Mod: CPTII,S$GLB,, | Performed by: PHYSICIAN ASSISTANT

## 2022-10-24 PROCEDURE — 99496 TRANSJ CARE MGMT HIGH F2F 7D: CPT | Mod: S$GLB,,, | Performed by: PHYSICIAN ASSISTANT

## 2022-10-24 PROCEDURE — 80048 BASIC METABOLIC PNL TOTAL CA: CPT | Performed by: PHYSICIAN ASSISTANT

## 2022-10-24 PROCEDURE — 3080F DIAST BP >= 90 MM HG: CPT | Mod: CPTII,S$GLB,, | Performed by: PHYSICIAN ASSISTANT

## 2022-10-24 PROCEDURE — 99496 TRANSITIONAL CARE MANAGE SERVICE 7 DAY DISCHARGE: ICD-10-PCS | Mod: S$GLB,,, | Performed by: PHYSICIAN ASSISTANT

## 2022-10-24 PROCEDURE — 36415 COLL VENOUS BLD VENIPUNCTURE: CPT | Performed by: PHYSICIAN ASSISTANT

## 2022-10-24 NOTE — PROGRESS NOTES
Subjective:       Patient ID: João Ham is a 42 y.o. male.    Chief Complaint: No chief complaint on file.      Transitional Care Note  Admit date: 10/10/22  Discharge date: 10/18/22  Date of interactive contact (2 business days post D/C): 10/19/22  Hospitalized at: [facility]  Discharge diagnoses: acute renal failure, diastolic dysfunction  Acute heart failure    Family and/or Caretaker present at visit?  No.  Diagnostic tests reviewed/disposition: I have reviewed all pending and diagnostic tests. Renal biopsy still in process  Disease/illness education: follow up with nephrology and   Medication changes: see belowMedication List     START taking these medications   calcitRIOL 0.25 MCG Cap  Commonly known as: ROCALTROL  Take 1 capsule (0.25 mcg total) by mouth once daily.  Start taking on: October 19, 2022     calcium carbonate 200 mg calcium (500 mg) chewable tablet  Commonly known as: TUMS  Take 2 tablets (1,000 mg total) by mouth 3 (three) times daily with meals.     cholecalciferol (vitamin D3) 125 mcg (5,000 unit) Tab  Take 1 tablet (5,000 Units total) by mouth once daily.  Start taking on: October 19, 2022     cloNIDine 0.1 MG tablet  Commonly known as: CATAPRES  Take 1 tablet (0.1 mg total) by mouth 3 (three) times daily.     furosemide 80 MG tablet  Commonly known as: LASIX  Take 1 tablet (80 mg total) by mouth 3 (three) times daily with meals.     hydrALAZINE 100 MG tablet  Commonly known as: APRESOLINE  Take 1 tablet (100 mg total) by mouth 3 (three) times daily.     isosorbide mononitrate 120 MG 24 hr tablet  Commonly known as: IMDUR  Take 1 tablet (120 mg total) by mouth once daily.  Start taking on: October 19, 2022     metoprolol succinate 50 MG 24 hr tablet  Commonly known as: TOPROL-XL  Take 3 tablets (150 mg total) by mouth once daily.  Start taking on: October 19, 2022     sevelamer carbonate 800 mg Tab  Commonly known as: RENVELA  Take 2 tablets (1,600 mg total) by mouth 3 (three)  times daily with meals.        STOP taking these medications   amlodipine-valsartan  mg per tablet  Commonly known as: EXFORGE     hydroCHLOROthiazide 25 MG tablet  Commonly known as: HYDRODIURIL  Home health/community services discussion/referrals: Patient has home health established at [facility].   Establishment or re-establishment of referral orders for community resources: No other necessary community resources.   Discussion with other health care providers: Patient needs follow up with nephrology and cardiology. .           Mr. Ham is a 42 year old male with HTN, recent GORGE, and edema who was hospitalized after presenting to the ED with swelling and shortness of breath. The patient was found to be in GORGE and acute heart failure with diastolic dysfunction. He was followed by cardiology and nephrology. The patient's medications were adjusted with improvement in swelling and symptoms. The patient did have renal biopsy; pathology is pending. The patient reports he is feeling much better since hospital discharge. The patient is tolerating his medications well. The patient has no additional complaints at this time. He does report he will need FMLA paper work completed. The patient also admit that he has had itching and throat swelling when eating mushrooms. The patient would like to see allergist.       [facility] Medicine  Discharge Summary        Patient Name: João Ham  MRN: [id number]  Patient Class: IP- Inpatient  Admission Date: 10/10/2022  Hospital Length of Stay: 8 days  Discharge Date and Time: 10/18/2022  3:57 PM  Attending Physician: No att. providers found   Discharging Provider: Mayito Chase MD  Primary Care Provider: Makenna Hooper PA-C        HPI:   João Ham is a 42 year old male with a past medical history of HTN, obesity, and anemia who presents with multiple weeks of worsening swelling to the legs and scrotum associated with worsening shortness of breath.  The patient thought that the swelling possibly was secondary to his blood pressure medication, so he stopped taking these medications multiple days ago. He denies any chest pain or palpitations. He denies any drug, tobacco or alcohol use. He has never experienced swelling of this severity before. He denies any issues with urination. He does endorse fatigue, changes in appetite and nausea and vomiting. While in the ED, the patient was given IV calcium, Dilaudid, and labetalol. A Cardene infusion was also started. Hospital Medicine was consulted for admission.        * No surgery found *       Hospital Course:   João Ham is a 42 year old male with a past medical history of HTN, obesity, and anemia who presented with multiple weeks of worsening swelling to the legs and scrotum associated with worsening shortness of breath secondary to hypertensive emergency with acute renal failure, demand ischemia and pulmonary edema. He was started on a Cardene infusion for BP control. He was also started on IV Lasix and metolazone diuresis given volume overload. Nephrology and Cardiology have been consulted. A TTE shows grade III diastolic dysfunction. The patient is also hypocalcemic (likely from renal failure); calcium is being repleted as well as vitamin D; calcitriol has also been started.      Complement levels, as well as anti-GBM and other autoimmune antibodies were negative.  Nephrology was consulted, initially started patient on Cardene drip for which he was weaned off to oral antihypertensives along with the assistance of Cardiology.  Patient underwent renal biopsy on 10/17 without incident.  His blood pressure was controlled at time of discharge.  He was discharged home with home health for help with his multiple medications, as well as follow-up with Nephrology, and primary care.        Goals of Care Treatment Preferences:  Code Status: Full Code        Consults:   Consults (From admission, onward)         Status Ordering Provider        Inpatient consult to Cardiology  Once       Provider:  Dawson Garsia MD   Completed RYDER CONTRERAS        Inpatient consult to Nephrology  Once       Provider:  Jeremy Madrid MD   Completed RYDER CONTRERAS           No new Assessment & Plan notes have been filed under this hospital service since the last note was generated.  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] 10/10/2022 Yes  · Hypoalbuminemia [E88.09] 10/10/2022 Yes  · Obesity (BMI 30-39.9) [E66.9] 10/10/2022 Yes  · Elevated troponin [R77.8] 10/10/2022 Yes     Problems Resolved During this Admission:    Diagnosis Date Noted Date Resolved POA  · Hypokalemia [E87.6] 10/12/2022 10/13/2022 No  · Hypocalcemia [E83.51] 10/10/2022 10/17/2022 Yes  · Acidosis, metabolic [E87.20] 10/10/2022 10/13/2022 Yes  · Elevated LFTs [R79.89] 10/10/2022 10/14/2022 Yes        Discharged Condition: stable     Disposition: [place] [facility]     Follow Up:      Follow-up Information       Makenna Hooper PA-C. Go to.   Specialty: Family Medicine  Why: APPOINTMENT:  October 24, 2022 at 7:00am  Hospital Follow up  Contact information:  [address] [facility]  [place] [address] [phone]                    Jeremy Madrid MD. Schedule an appointment as soon as possible for a visit in 1 week(s).   Specialty: Nephrology  Contact information:  [address] [facility]  [place] [id number] [phone]                    [facility] Follow up.   Specialties: Home Health Services, Home Therapy Services, Home Living Aide Services  Why: Home Health  Contact information:  [address] [place] [address]  Blvd  [facility] [address]  [phone]                        Patient Instructions:       Ambulatory referral/consult to Home Health   Standing Status: Future  Referral Priority: Routine Referral Type: Home Health   Referral Reason: Specialty Services Required   Requested Specialty: Home Health Services   Number of Visits Requested: 1      Diet renal     Notify your health care provider if you experience any of the following:  temperature >100.4     Notify your health care provider if you experience any of the following:  persistent nausea and vomiting or diarrhea     Notify your health care provider if you experience any of the following:  severe uncontrolled pain     Activity as tolerated        Significant Diagnostic Studies: Labs:   BMP:   Recent Labs  Lab 10/17/22  0513 10/18/22  0558  GLU 97 94   140  K 4.1 4.0  CL 95 95  CO2 31* 32*  BUN 76* 79*  CREATININE 6.7* 6.8*  CALCIUM 8.6* 9.0  MG 2.0 2.0   and CBC   Recent Labs  Lab 10/17/22  0513 10/18/22  0558  WBC 5.25 5.13  HGB 8.7* 9.3*  HCT 27.4* 28.8*   351        Pending Diagnostic Studies:     Procedure Component Value Units Date/Time    Aldosterone/Renin Activity Ratio [[id number]] Collected: 10/16/22 0508    Order Status: Sent Lab Status: In process Updated: 10/16/22 1024    Specimen: Blood      Anti-Neutrophilic Cytoplasmic Antibody [[id number]] Collected: 10/15/22 0512    Order Status: Sent Lab Status: In process Updated: 10/15/22 0528    Specimen: Blood      EKG 12-lead [[id number]] Collected: 10/13/22 1132    Order Status: Sent Lab Status: In process Updated: 10/14/22 0801    Narrative:      Test Reason : R94.31,     Vent. Rate : 079 BPM     Atrial Rate : 079 BPM     P-R Int : 136 ms          QRS Dur : 096 ms      QT Int : 470 ms       P-R-T Axes : 061 -62 042 degrees     QTc Int : 538 ms     Normal sinus rhythm  Possible Left atrial enlargement  Incomplete right bundle branch block  Left anterior fascicular block  LVH  Cannot rule out  Septal infarct ,age undetermined  T wave abnormality, consider anterolateral ischemia  Prolonged QT  Abnormal ECG  When compared with ECG of 10-OCT-2022 13:54,  Incomplete right bundle branch block is now Present  Minimal criteria for Septal infarct are now Present     Referred By: BRANDON   SELF           Confirmed By:     Specimen to Pathology, Radiology Kidney, Misbah biopsy [[id number]] Collected: 10/17/22 1149    Order Status: Sent Lab Status: In process Updated: 10/17/22 8987        Medications:  Reconciled Home Medications:      Medication List     START taking these medications   calcitRIOL 0.25 MCG Cap  Commonly known as: ROCALTROL  Take 1 capsule (0.25 mcg total) by mouth once daily.  Start taking on: October 19, 2022     calcium carbonate 200 mg calcium (500 mg) chewable tablet  Commonly known as: TUMS  Take 2 tablets (1,000 mg total) by mouth 3 (three) times daily with meals.     cholecalciferol (vitamin D3) 125 mcg (5,000 unit) Tab  Take 1 tablet (5,000 Units total) by mouth once daily.  Start taking on: October 19, 2022     cloNIDine 0.1 MG tablet  Commonly known as: CATAPRES  Take 1 tablet (0.1 mg total) by mouth 3 (three) times daily.     furosemide 80 MG tablet  Commonly known as: LASIX  Take 1 tablet (80 mg total) by mouth 3 (three) times daily with meals.     hydrALAZINE 100 MG tablet  Commonly known as: APRESOLINE  Take 1 tablet (100 mg total) by mouth 3 (three) times daily.     isosorbide mononitrate 120 MG 24 hr tablet  Commonly known as: IMDUR  Take 1 tablet (120 mg total) by mouth once daily.  Start taking on: October 19, 2022     metoprolol succinate 50 MG 24 hr tablet  Commonly known as: TOPROL-XL  Take 3 tablets (150 mg total) by mouth once daily.  Start taking on: October 19, 2022     sevelamer carbonate 800 mg Tab  Commonly known as: RENVELA  Take 2 tablets (1,600 mg total) by mouth 3 (three) times daily with meals.        STOP taking these medications   amlodipine-valsartan   mg per tablet  Commonly known as: EXFORGE     hydroCHLOROthiazide 25 MG tablet  Commonly known as: HYDRODIURIL              Indwelling Lines/Drains at time of discharge:   Lines/Drains/Airways     None                     Time spent on the discharge of patient:  37 minutes           Mayito Chase MD  Department of Hospital Medicine  [facility]      Review of patient's allergies indicates:  No Known Allergies      Current Outpatient Medications:     calcitRIOL (ROCALTROL) 0.25 MCG Cap, Take 1 capsule (0.25 mcg total) by mouth once daily., Disp: 30 capsule, Rfl: 0    calcium carbonate (TUMS) 200 mg calcium (500 mg) chewable tablet, Take 2 tablets (1,000 mg total) by mouth 3 (three) times daily with meals., Disp: 180 tablet, Rfl: 11    cholecalciferol, vitamin D3, 125 mcg (5,000 unit) Tab, Take 1 tablet (5,000 Units total) by mouth once daily., Disp: 30 tablet, Rfl: 0    cloNIDine (CATAPRES) 0.1 MG tablet, Take 1 tablet (0.1 mg total) by mouth 3 (three) times daily., Disp: 90 tablet, Rfl: 11    furosemide (LASIX) 80 MG tablet, Take 1 tablet (80 mg total) by mouth 3 (three) times daily with meals., Disp: 90 tablet, Rfl: 11    hydrALAZINE (APRESOLINE) 100 MG tablet, Take 1 tablet (100 mg total) by mouth 3 (three) times daily., Disp: 90 tablet, Rfl: 11    isosorbide mononitrate (IMDUR) 120 MG 24 hr tablet, Take 1 tablet (120 mg total) by mouth once daily., Disp: 30 tablet, Rfl: 11    metoprolol succinate (TOPROL-XL) 50 MG 24 hr tablet, Take 3 tablets (150 mg total) by mouth once daily., Disp: 90 tablet, Rfl: 11    sevelamer carbonate (RENVELA) 800 mg Tab, Take 2 tablets (1,600 mg total) by mouth 3 (three) times daily with meals., Disp: 180 tablet, Rfl: 11    Lab Results   Component Value Date    WBC 5.13 10/18/2022    HGB 9.3 (L) 10/18/2022    HCT 28.8 (L) 10/18/2022     10/18/2022    CHOL 141 10/11/2022    TRIG 66 10/11/2022    HDL 39 (L) 10/11/2022    ALT 25 10/18/2022    AST 11 10/18/2022      10/18/2022    K 4.0 10/18/2022    CL 95 10/18/2022    CREATININE 6.8 (H) 10/18/2022    BUN 79 (H) 10/18/2022    CO2 32 (H) 10/18/2022    TSH 1.699 10/10/2022    INR 1.1 10/10/2022    HGBA1C 5.5 10/10/2022       Review of Systems   Constitutional:  Negative for activity change, appetite change and fever.   HENT:  Negative for postnasal drip, rhinorrhea and sinus pressure.    Eyes:  Negative for visual disturbance.   Respiratory:  Negative for cough and shortness of breath.    Cardiovascular:  Positive for leg swelling (improved). Negative for chest pain.   Gastrointestinal:  Negative for abdominal distention and abdominal pain.   Genitourinary:  Negative for difficulty urinating and dysuria.   Musculoskeletal:  Negative for arthralgias and myalgias.   Neurological:  Negative for headaches.   Hematological:  Negative for adenopathy.   Psychiatric/Behavioral:  The patient is not nervous/anxious.      Objective:      Physical Exam  Constitutional:       Appearance: Normal appearance.   HENT:      Head: Normocephalic and atraumatic.   Eyes:      Conjunctiva/sclera: Conjunctivae normal.   Cardiovascular:      Rate and Rhythm: Normal rate and regular rhythm.   Pulmonary:      Effort: Pulmonary effort is normal. No respiratory distress.      Breath sounds: Normal breath sounds. No wheezing.   Abdominal:      General: There is no distension.      Palpations: There is no mass.      Tenderness: There is no abdominal tenderness.   Musculoskeletal:      Right lower leg: Edema present.      Left lower leg: Edema present.      Comments: Pitting edema bilaterally- improved since hospitalization   Skin:     Findings: No erythema.   Neurological:      Mental Status: He is alert and oriented to person, place, and time.   Psychiatric:         Behavior: Behavior normal.       Assessment:       1. Hospital discharge follow-up    2. Acute renal failure, unspecified acute renal failure type    3. Malignant hypertensive urgency    4.  Acute heart failure with preserved ejection fraction    5. Obesity (BMI 30-39.9)    6. Diastolic dysfunction    7. Allergy, initial encounter    8. Pleural effusion, right        Plan:   João was seen today for hospital follow up.    Diagnoses and all orders for this visit:    Hospital discharge follow-up  -     Ambulatory referral/consult to Cardiology; Future    Acute renal failure, unspecified acute renal failure type  Patient to have labs with HH today  Follow up with Dr. Madrid  Malignant hypertensive urgency  BP control improved but not ideal  Continue current meds  Follow up in 2 weeks for BP check  Follow up with cardiology   Acute heart failure with preserved ejection fraction  -     Ambulatory referral/consult to Cardiology; Future    Obesity (BMI 30-39.9)  Low carb, high fiber diet  Low impact exercise  Diastolic dysfunction  -     Ambulatory referral/consult to Cardiology; Future    Allergy, initial encounter  -     Ambulatory referral/consult to Allergy; Future    Pleural effusion, right  -     CT Chest Without Contrast; Future

## 2022-10-24 NOTE — TELEPHONE ENCOUNTER
Patient was seen today for hospital follow up. He was in the hospital for heart failure and HTN malignancy. He was seen by Dr. Wolff inpatient. Please assist patient with cardiology appt.

## 2022-10-24 NOTE — PATIENT INSTRUCTIONS
"Tiago Moyer,     If you are due for any health screening(s) below please notify me so we can arrange them to be ordered and scheduled to maintain your health. Most healthy patients complete it. Don't lose out on improving your health.     Tests to Keep You Healthy    Last Blood Pressure <= 139/89 (10/24/2022): NO                          Patient Education       Checking Your Blood Pressure at Home   The Basics   Written by the doctors and editors at [facility]   How is blood pressure measured? -- Blood pressure is usually measured with a device that goes around your upper arm. This is often done in a doctor's office. But some people also check their blood pressure themselves, at home or at work.  Blood pressure is explained with 2 numbers. For instance, your blood pressure might be "140 over 90." The first (top) number is the pressure inside your arteries when your heart is emily. The second (bottom) number is the pressure inside your arteries when your heart is relaxed. The table shows how doctors and nurses define high and normal blood pressure (table 1).  If your blood pressure gets too high, it puts you at risk for heart attack, stroke, and kidney disease. High blood pressure does not usually cause symptoms. But it can be serious.  What is a home blood pressure meter? -- A home blood pressure meter (or "monitor") is a device you can use to check your blood pressure yourself. It has a cuff that goes around your upper arm (figure 1). Some devices have a cuff that goes around your wrist instead. But doctors aren't sure if these work as well. The meter also has a small screen, or dial, that shows your blood pressure numbers.  There are also special meters you can wear for a day or 2. These are different because they automatically check your blood pressure throughout the day and night, even while you are sleeping. If your doctor thinks you should use one of these devices, they will talk to you about how to wear " it.  Why do I need to check my blood pressure at home? -- If your doctor knows or suspects that you have high blood pressure, they might want you to check it at home. There are a few reasons for this. Your doctor might want to look at:  Whether your blood pressure measures the same at home as it did in the doctor's office  How well your blood pressure medicines are working  Changes in your blood pressure, for example, if it goes up and down  People who check their own blood pressure at home usually do better at keeping it low.  How do I choose a home blood pressure meter? -- When choosing a home blood pressure meter, you will probably want to think about:  Cost - Some devices cost more than others. You should also check to see if your insurance will help pay for your device.  Size - It's important to make sure the cuff fits your arm comfortably. Your doctor or nurse can help you with this.  How easy it is to use - You should make sure you understand how to use the device. You also need to be able to read the numbers on the screen.  You do not need a prescription to buy a home blood pressure meter. You can buy them at most pharmacies or over the internet. Your doctor or nurse can help you choose the right device for you.  How do I check my blood pressure at home? -- Once you have a home blood pressure meter, your doctor or nurse should check it to make sure it fits you and works correctly.  When it's time to check your blood pressure:  Go to the bathroom and empty your bladder first. Having a full bladder can temporarily increase your blood pressure, making the results inaccurate.  Sit in a chair with your feet flat on the ground.  Try to breathe normally and stay calm.  Attach the cuff to your arm. Place the cuff directly on your skin, not over your clothing. The cuff should be tight enough to not slip down, but not uncomfortably tight.  Sit and relax for about 3 to 5 minutes with the cuff on.  Follow the directions  that came with your device to start measuring your blood pressure. This might involve squeezing the bulb at the end of the tube to inflate the cuff (fill it with air). With some monitors, you just need to press a button to inflate the cuff. When the cuff fills with air, it feels like someone is squeezing your arm, but it should not hurt. Then you will slowly deflate the cuff (let the air out of it), or it will deflate by itself. The screen or dial will show your blood pressure numbers.  Stay seated and relax for 1 minute, then measure your blood pressure again.  How often should I check my blood pressure? -- It depends. Different people need to follow different schedules. Your doctor or nurse will tell you how often to check your blood pressure, and when. Some people need to check their blood pressure twice a day, in the morning and evening.  Your doctor or nurse will probably tell you to keep track of your blood pressure for at least a few days (table 2). Then they will look at the numbers. The reason for this is that it's normal for your blood pressure to change a bit from day to day. For example, the numbers might change depending on whether you recently had caffeine, just exercised, or feel stressed. Checking your blood pressure over several days - or longer - will give your doctor or nurse a better idea of what is average for you.  How should I keep track of my blood pressure? -- Some blood pressure meters will record your numbers for you, or send them to your computer or smartphone. If yours does not do this, you will need to write them down. Your doctor or nurse can help you figure out the best way to keep track of the numbers.  What if my blood pressure is high? -- Your doctor or nurse will tell you what to do if your blood pressure is high when you check it at home. If you get a number that is higher than normal, measure it again to see if it is still high. If it is very high (above a certain number, which  "your doctor or nurse will tell you to watch out for), you should call your doctor right away.  If your blood pressure is only a little high, your doctor or nurse might tell you to keep checking it for a few more days or weeks, and then call if it does not go back down. Then they can help you decide what to do next.  All topics are updated as new evidence becomes available and our peer review process is complete.  This topic retrieved from [employer] on: Sep 21, 2021.  Topic 028371 Version 4.0  Release: 29.4.2 - C29.263  © 2021 UpToDate, Inc. and/or its affiliates. All rights reserved.  table 1: Definition of normal and high blood pressure  Level  Top number  Bottom number    High 130 or above 80 or above   Elevated 120 to 129 79 or below   Normal 119 or below 79 or below   These definitions are from the American College of Cardiology/American Heart Association. Other expert groups might use slightly different definitions.  "Elevated blood pressure" is a term doctor or nurses use as a warning. It means you do not yet have high blood pressure, but your blood pressure is not as low as it should be for good health.  Graphic 51362 Version 6.0  figure 1: Using a home blood pressure meter     This is an example of a person using a home blood pressure meter.  Graphic 597687 Version 1.0    table 2: 7-day diary for checking blood pressure at home  Day 1  Day 2  Day 3  Day 4  Day 5  Day 6  Day 7    Morning  1st read Morning  1st read Morning  1st read Morning  1st read Morning  1st read Morning  1st read Morning  1st read   Systolic: __________ Systolic: __________ Systolic: __________ Systolic: __________ Systolic: __________ Systolic: __________ Systolic: __________   Diastolic: __________ Diastolic: __________ Diastolic: __________ Diastolic: __________ Diastolic: __________ Diastolic: __________ Diastolic: __________   Pulse: __________ Pulse: __________ Pulse: __________ Pulse: __________ Pulse: __________ Pulse: __________ " Pulse: __________   Morning  2nd read Morning  2nd read Morning  2nd read Morning  2nd read Morning  2nd read Morning  2nd read Morning  2nd read   Systolic: __________ Systolic: __________ Systolic: __________ Systolic: __________ Systolic: __________ Systolic: __________ Systolic: __________   Diastolic: __________ Diastolic: __________ Diastolic: __________ Diastolic: __________ Diastolic: __________ Diastolic: __________ Diastolic: __________   Pulse: __________ Pulse: __________ Pulse: __________ Pulse: __________ Pulse: __________ Pulse: __________ Pulse: __________   Evening  1st read Evening  1st read Evening  1st read Evening  1st read Evening  1st read Evening  1st read Evening  1st read   Systolic: __________ Systolic: __________ Systolic: __________ Systolic: __________ Systolic: __________ Systolic: __________ Systolic: __________   Diastolic: __________ Diastolic: __________ Diastolic: __________ Diastolic: __________ Diastolic: __________ Diastolic: __________ Diastolic: __________   Pulse: __________ Pulse: __________ Pulse: __________ Pulse: __________ Pulse: __________ Pulse: __________ Pulse: __________   Evening  2nd read Evening  2nd read Evening  2nd read Evening  2nd read Evening  2nd read Evening  2nd read Evening  2nd read   Systolic: __________ Systolic: __________ Systolic: __________ Systolic: __________ Systolic: __________ Systolic: __________ Systolic: __________   Diastolic: __________ Diastolic: __________ Diastolic: __________ Diastolic: __________ Diastolic: __________ Diastolic: __________ Diastolic: __________   Pulse: __________ Pulse: __________ Pulse: __________ Pulse: __________ Pulse: __________ Pulse: __________ Pulse: __________   Notes    Notes    Notes    Notes    Notes    Notes    Notes      ____________________ ____________________ ____________________ ____________________ ____________________ ____________________ ____________________   ____________________  ____________________ ____________________ ____________________ ____________________ ____________________ ____________________   ____________________ ____________________ ____________________ ____________________ ____________________ ____________________ ____________________   Patient name: ______________________________     Patient ID: ________________________________    Primary care provider: _______________________    Average BP: _______________________________    [facility] [id number] Version 1.0  Consumer Information Use and Disclaimer   This information is not specific medical advice and does not replace information you receive from your health care provider. This is only a brief summary of general information. It does NOT include all information about conditions, illnesses, injuries, tests, procedures, treatments, therapies, discharge instructions or life-style choices that may apply to you. You must talk with your health care provider for complete information about your health and treatment options. This information should not be used to decide whether or not to accept your health care provider's advice, instructions or recommendations. Only your health care provider has the knowledge and training to provide advice that is right for you. The use of this information is governed by the [employer] User License Agreement, available at https://www.[employer]/en/solutions/[employer]/about/filippo.The use of [employer] content is governed by the [employer] Terms of Use. ©2021 UpToDate, Inc. All rights reserved.  Copyright   © 2021 UpToDate, Inc. and/or its affiliates. All rights reserved.

## 2022-10-25 DIAGNOSIS — N17.9 AKI (ACUTE KIDNEY INJURY): Primary | ICD-10-CM

## 2022-10-25 NOTE — TELEPHONE ENCOUNTER
Rachelle Hooper PA-C  Cc: BRITT AMARAL Staff  Good Morning, this patients insurance policy has changed as of 10/01/2022. This insurance is now Out of Network for anything done outside of [place]. The patient will be contacted to be notified.

## 2022-10-25 NOTE — TELEPHONE ENCOUNTER
Received this message:  Good Morning, this patients insurance policy has changed as of 10/01/2022. This insurance is now Out of Network for anything done outside of [place]. The patient will be contacted to be notified.       I reached out to case management and received this advice:    If he is a [place] resident, He needs to call his [place] and have it changed to [place] so he can be in network with all his [place] providers.       If he is just visiting - He will still have to call his insurance and let them tell him who he can see that is in network.      The patient has serious medical conditions that will require follow up with specialists, Please let me know what he is planning to do about his medical care.

## 2022-10-28 ENCOUNTER — EXTERNAL HOME HEALTH (OUTPATIENT)
Dept: HOME HEALTH SERVICES | Facility: HOSPITAL | Age: 42
End: 2022-10-28
Payer: COMMERCIAL

## 2022-10-28 ENCOUNTER — TELEPHONE (OUTPATIENT)
Dept: FAMILY MEDICINE | Facility: CLINIC | Age: 42
End: 2022-10-28
Payer: COMMERCIAL

## 2022-10-28 NOTE — TELEPHONE ENCOUNTER
Spoke to pt informed and verbalizes understanding. Pt states he lives in [place] and [place] in [place]. His employer supplies the [place] blue insurance. Pt will contact insurance to see what needs to be done.

## 2022-11-02 ENCOUNTER — TELEPHONE (OUTPATIENT)
Dept: FAMILY MEDICINE | Facility: CLINIC | Age: 42
End: 2022-11-02
Payer: COMMERCIAL

## 2022-11-02 LAB
FINAL PATHOLOGIC DIAGNOSIS: NORMAL
GROSS: NORMAL
Lab: NORMAL

## 2022-11-02 NOTE — TELEPHONE ENCOUNTER
----- Message from Delmi Hoover sent at 11/2/2022 10:54 AM CDT -----  Type: Needs Medical Advice  Who Called:  Anayeli home health nurse  Symptoms (please be specific):  pt BP is 175/88 pulse 80 have a low grade fever 99.3--also meds everyday--have been jeremías for a few day--please call and advise  Best Call Back Number: [phone]  Additional Information: thank you

## 2022-11-07 ENCOUNTER — CLINICAL SUPPORT (OUTPATIENT)
Dept: FAMILY MEDICINE | Facility: CLINIC | Age: 42
End: 2022-11-07
Payer: COMMERCIAL

## 2022-11-07 ENCOUNTER — TELEPHONE (OUTPATIENT)
Dept: FAMILY MEDICINE | Facility: CLINIC | Age: 42
End: 2022-11-07
Payer: COMMERCIAL

## 2022-11-07 VITALS — SYSTOLIC BLOOD PRESSURE: 160 MMHG | HEART RATE: 70 BPM | DIASTOLIC BLOOD PRESSURE: 92 MMHG

## 2022-11-07 DIAGNOSIS — I10 HYPERTENSION, UNSPECIFIED TYPE: ICD-10-CM

## 2022-11-07 DIAGNOSIS — N17.9 AKI (ACUTE KIDNEY INJURY): Primary | ICD-10-CM

## 2022-11-07 DIAGNOSIS — Z01.30 BP CHECK: Primary | ICD-10-CM

## 2022-11-07 PROCEDURE — 99999 PR PBB SHADOW E&M-EST. PATIENT-LVL II: ICD-10-PCS | Mod: PBBFAC,,,

## 2022-11-07 PROCEDURE — 99999 PR PBB SHADOW E&M-EST. PATIENT-LVL II: CPT | Mod: PBBFAC,,,

## 2022-11-07 NOTE — PROGRESS NOTES
João Ham 42 y.o. male is here today for Blood Pressure check. Pt reported he is very stressed about problems with insurance coverage.  History of HTN yes.    Review of patient's allergies indicates:  No Known Allergies  Creatinine   Date Value Ref Range Status   10/24/2022 7.6 (H) 0.5 - 1.4 mg/dL Final     Sodium   Date Value Ref Range Status   10/24/2022 142 136 - 145 mmol/L Final     Potassium   Date Value Ref Range Status   10/24/2022 4.4 3.5 - 5.1 mmol/L Final   ]  Patient verifies taking blood pressure medications on a regular basis at the same time of the day.     Current Outpatient Medications:     calcitRIOL (ROCALTROL) 0.25 MCG Cap, Take 1 capsule (0.25 mcg total) by mouth once daily., Disp: 30 capsule, Rfl: 0    calcium carbonate (TUMS) 200 mg calcium (500 mg) chewable tablet, Take 2 tablets (1,000 mg total) by mouth 3 (three) times daily with meals., Disp: 180 tablet, Rfl: 11    cholecalciferol, vitamin D3, 125 mcg (5,000 unit) Tab, Take 1 tablet (5,000 Units total) by mouth once daily., Disp: 30 tablet, Rfl: 0    cloNIDine (CATAPRES) 0.1 MG tablet, Take 1 tablet (0.1 mg total) by mouth 3 (three) times daily., Disp: 90 tablet, Rfl: 11    furosemide (LASIX) 80 MG tablet, Take 1 tablet (80 mg total) by mouth 3 (three) times daily with meals., Disp: 90 tablet, Rfl: 11    hydrALAZINE (APRESOLINE) 100 MG tablet, Take 1 tablet (100 mg total) by mouth 3 (three) times daily., Disp: 90 tablet, Rfl: 11    isosorbide mononitrate (IMDUR) 120 MG 24 hr tablet, Take 1 tablet (120 mg total) by mouth once daily., Disp: 30 tablet, Rfl: 11    metoprolol succinate (TOPROL-XL) 50 MG 24 hr tablet, Take 3 tablets (150 mg total) by mouth once daily., Disp: 90 tablet, Rfl: 11    sevelamer carbonate (RENVELA) 800 mg Tab, Take 2 tablets (1,600 mg total) by mouth 3 (three) times daily with meals., Disp: 180 tablet, Rfl: 11    Does patient have record of home blood pressure readings no. Readings have been averaging 150/90.    Last dose of blood pressure medication was taken at 12:00 noon today (current time was 1:00 p.m.)  Patient is asymptomatic.    Initial BP was 162/98  Blood pressure reading after 15 minutes was 160/92, Pulse 70.  Makenna Hooper PA-C notified.

## 2022-11-08 NOTE — TELEPHONE ENCOUNTER
Blood pressure not well controlled. Needs appt with nephrology asap. Referral was placed to Dr. Madrid at last visit. Can we refax the referral and call the office about contacting patient for an appt? He was referred for Acute kidney injury but recent kidney biopsy also returned with MODERATE ARTERIONEPHROSCLEROSIS .  Please also schedule an appt with me asap to discuss possible medication adjustments. Patient will need labs updated before appt. Orders placed.

## 2022-11-09 NOTE — TELEPHONE ENCOUNTER
Spoke to patient yesterdays he is waiting on clarification from his insurance company before labs and any other testing or appointment can  be completed .

## 2022-11-10 ENCOUNTER — TELEPHONE (OUTPATIENT)
Dept: FAMILY MEDICINE | Facility: CLINIC | Age: 42
End: 2022-11-10
Payer: COMMERCIAL

## 2022-11-10 VITALS — DIASTOLIC BLOOD PRESSURE: 90 MMHG | SYSTOLIC BLOOD PRESSURE: 174 MMHG

## 2022-11-10 NOTE — TELEPHONE ENCOUNTER
----- Message from Naima Perry sent at 11/10/2022 11:17 AM CST -----  Contact: Anayeli  Type:  Needs Medical Advice    Who Called: Anayeli  Symptoms (please be specific): elevated BP   How long has patient had these symptoms:  3wks  Pharmacy name and phone #:    [id number] - [place], [place] - [address]  [address]  [place] [address]  Phone: [phone] Fax: [phone]      Would the patient rather a call back or a response via [facility]? call  Best Call Back Number: [phone]  Additional Information: Anayeli called to state BP elevated 170/84 but Pt is eating correct and taking meds as scheduled. BP has consistently stayed elevated.

## 2022-11-10 NOTE — TELEPHONE ENCOUNTER
Spoke to Anayeli with HH whom states pt's BP has been elevated for about 3 weeks now. Pt's BP this am was 174/90. Reviewed with  nurse Anayeli pt was seen for BP monitoring and orders as well as referrals have been placed and pt advised he would call back to schedule once he heard back from his insurance. Anayeli states pt was denied from his disability and insurance, leave of absence. Pt is having trouble paying for the appts. Nurse is asking for possible med adjustment prior to testing and appts. Nurse states pt is following diet and taking meds as prescribed. Please advise

## 2022-11-10 NOTE — TELEPHONE ENCOUNTER
Reviewed hospital discharge summary , clinic note from Mrs Aguirre in addition to Nurse BP note and multiple telephone messages since office visit   This is a very complex case and I have never seen this patient   First of all he needs to be seen by nephrology ASAP. Please assure that this has been done or will be done ASAP  He needs to see cardiology also.     We can increase clonidine to 0.2 mg tid continue to monitor BP   He may need to go back to ER

## 2022-11-11 ENCOUNTER — TELEPHONE (OUTPATIENT)
Dept: FAMILY MEDICINE | Facility: CLINIC | Age: 42
End: 2022-11-11
Payer: COMMERCIAL

## 2022-11-11 DIAGNOSIS — I10 HYPERTENSION, UNSPECIFIED TYPE: ICD-10-CM

## 2022-11-11 DIAGNOSIS — Z59.9 CHANGE IN FINANCIAL CIRCUMSTANCES: Primary | ICD-10-CM

## 2022-11-11 DIAGNOSIS — N17.9 AKI (ACUTE KIDNEY INJURY): ICD-10-CM

## 2022-11-11 SDOH — SOCIAL DETERMINANTS OF HEALTH (SDOH): PROBLEM RELATED TO HOUSING AND ECONOMIC CIRCUMSTANCES, UNSPECIFIED: Z59.9

## 2022-11-11 NOTE — TELEPHONE ENCOUNTER
Spoke with pt via phone to advise of provider recommendations. Pt states that he does not have any funds to  a new prescription or to see specialists. He states that his insurance has denied covering these visits due to being out of network.     Spoke with Angie with case mgmt she states she will reach out to see if pt qualifies for medicaid. If pt does not qualify then [facility] could be an option. Pt can also be referred to the Pharmacy Assistance program. Mrs. Adams states she will also reach out to Dr. Garcia's office for pt to have hospital f/u.     Mrs. Adams recommends that referral be placed for Pharmacy assistance so pt can get help with all meds.     Clonidine is also on [employer] $4 list.     Attempted to call pt to gather more information in regards to insurance. No answer, LVM.

## 2022-11-11 NOTE — TELEPHONE ENCOUNTER
Concerned about this patient's blood pressure and delayed access to care. Is he seeing nephrology? How is blood pressure running since med adjustment? Can we get him in with MD sooner than scheduled?

## 2022-11-14 ENCOUNTER — TELEPHONE (OUTPATIENT)
Dept: PHARMACY | Facility: CLINIC | Age: 42
End: 2022-11-14
Payer: COMMERCIAL

## 2022-11-14 NOTE — TELEPHONE ENCOUNTER
I have reached out to João Ham to inform him of the application process for The Pharmacy Patient Assistance Program and whats required to apply.  João Ham did not answer. I left a voicemail and mailed a letter introducing him to the pharmacy patient assistance program. I will follow up in 5 business days.

## 2022-11-14 NOTE — TELEPHONE ENCOUNTER
Spoke with the patient and he informed me that he have an deductible to pay with his insurance card, I explained that he can't get pass his deductible, that have to be paid first before he would see a difference with the cost of his medication. I told him if he wasn't sure about the amount of his deductible or how much more he have to pay, call his insurance and they would be alke to assist him with that information.

## 2022-11-14 NOTE — LETTER
November 14, 2022    João Ham  [address] [facility] [address] [address]             [facility] Pharmacy Assistance  [address]  [facility] [address]  Phone: [phone]  Fax: [phone]   Dear Mr. Ham,    My Name is Theresa Mancia. I am a Pharmacy Technician reaching out on behalf of [facility]s Pharmacy Patient Assistance Team after receiving a referral from your provider inquiring about assistance with your medications. I tried to call you on 11/14/2022 but was unable to reach you. Our goal is to assist qualified patients with financial assistance for their medications to better help you achieve your health goals!    Please note that enrollment and eligibility into available support may require the following documents:     Proof of household Income( such as social security statement, 1099 form, pension statement or 3 consecutive pay stubs   Copy of all insurance cards (front and back)    Print out from your insurance or pharmacy showing how much you have spent on prescriptions this year   Signed HIPAA and Authorization forms (These forms will be sent to you once you contact us)     Please reach out to my phone number below if you are still in need of assistance with your medications. We will attempt to reach out to you through [employer] or via phone call again in 5 business days. We look forward to hearing from you soon!    Thank you for choosing [facility] for your healthcare needs    Sincerely  Theresa Mancia

## 2022-11-15 ENCOUNTER — OFFICE VISIT (OUTPATIENT)
Dept: CARDIOLOGY | Facility: CLINIC | Age: 42
End: 2022-11-15
Payer: COMMERCIAL

## 2022-11-15 VITALS
HEART RATE: 53 BPM | DIASTOLIC BLOOD PRESSURE: 97 MMHG | SYSTOLIC BLOOD PRESSURE: 158 MMHG | WEIGHT: 284.5 LBS | BODY MASS INDEX: 36.53 KG/M2 | OXYGEN SATURATION: 97 %

## 2022-11-15 DIAGNOSIS — I27.20 PULMONARY HTN: ICD-10-CM

## 2022-11-15 DIAGNOSIS — I10 PRIMARY HYPERTENSION: ICD-10-CM

## 2022-11-15 DIAGNOSIS — I50.31 ACUTE HEART FAILURE WITH PRESERVED EJECTION FRACTION: ICD-10-CM

## 2022-11-15 DIAGNOSIS — E66.9 OBESITY (BMI 30-39.9): ICD-10-CM

## 2022-11-15 DIAGNOSIS — I50.32 CHF (CONGESTIVE HEART FAILURE), NYHA CLASS II, CHRONIC, DIASTOLIC: Primary | ICD-10-CM

## 2022-11-15 DIAGNOSIS — Z09 HOSPITAL DISCHARGE FOLLOW-UP: ICD-10-CM

## 2022-11-15 DIAGNOSIS — N17.9 AKI (ACUTE KIDNEY INJURY): ICD-10-CM

## 2022-11-15 DIAGNOSIS — D63.1 ANEMIA DUE TO STAGE 4 CHRONIC KIDNEY DISEASE: ICD-10-CM

## 2022-11-15 DIAGNOSIS — I51.89 DIASTOLIC DYSFUNCTION: ICD-10-CM

## 2022-11-15 DIAGNOSIS — N18.4 ANEMIA DUE TO STAGE 4 CHRONIC KIDNEY DISEASE: ICD-10-CM

## 2022-11-15 PROCEDURE — 1111F PR DISCHARGE MEDS RECONCILED W/ CURRENT OUTPATIENT MED LIST: ICD-10-PCS | Mod: CPTII,S$GLB,, | Performed by: GENERAL PRACTICE

## 2022-11-15 PROCEDURE — 3080F DIAST BP >= 90 MM HG: CPT | Mod: CPTII,S$GLB,, | Performed by: GENERAL PRACTICE

## 2022-11-15 PROCEDURE — 1159F PR MEDICATION LIST DOCUMENTED IN MEDICAL RECORD: ICD-10-PCS | Mod: CPTII,S$GLB,, | Performed by: GENERAL PRACTICE

## 2022-11-15 PROCEDURE — 3080F PR MOST RECENT DIASTOLIC BLOOD PRESSURE >= 90 MM HG: ICD-10-PCS | Mod: CPTII,S$GLB,, | Performed by: GENERAL PRACTICE

## 2022-11-15 PROCEDURE — 1160F RVW MEDS BY RX/DR IN RCRD: CPT | Mod: CPTII,S$GLB,, | Performed by: GENERAL PRACTICE

## 2022-11-15 PROCEDURE — 1111F DSCHRG MED/CURRENT MED MERGE: CPT | Mod: CPTII,S$GLB,, | Performed by: GENERAL PRACTICE

## 2022-11-15 PROCEDURE — 99999 PR PBB SHADOW E&M-EST. PATIENT-LVL III: CPT | Mod: PBBFAC,,, | Performed by: GENERAL PRACTICE

## 2022-11-15 PROCEDURE — 3008F PR BODY MASS INDEX (BMI) DOCUMENTED: ICD-10-PCS | Mod: CPTII,S$GLB,, | Performed by: GENERAL PRACTICE

## 2022-11-15 PROCEDURE — 99999 PR PBB SHADOW E&M-EST. PATIENT-LVL III: ICD-10-PCS | Mod: PBBFAC,,, | Performed by: GENERAL PRACTICE

## 2022-11-15 PROCEDURE — 99213 OFFICE O/P EST LOW 20 MIN: CPT | Mod: S$GLB,,, | Performed by: GENERAL PRACTICE

## 2022-11-15 PROCEDURE — 1159F MED LIST DOCD IN RCRD: CPT | Mod: CPTII,S$GLB,, | Performed by: GENERAL PRACTICE

## 2022-11-15 PROCEDURE — 93000 ELECTROCARDIOGRAM COMPLETE: CPT | Mod: S$GLB,,, | Performed by: GENERAL PRACTICE

## 2022-11-15 PROCEDURE — 99213 PR OFFICE/OUTPT VISIT, EST, LEVL III, 20-29 MIN: ICD-10-PCS | Mod: S$GLB,,, | Performed by: GENERAL PRACTICE

## 2022-11-15 PROCEDURE — 3077F PR MOST RECENT SYSTOLIC BLOOD PRESSURE >= 140 MM HG: ICD-10-PCS | Mod: CPTII,S$GLB,, | Performed by: GENERAL PRACTICE

## 2022-11-15 PROCEDURE — 1160F PR REVIEW ALL MEDS BY PRESCRIBER/CLIN PHARMACIST DOCUMENTED: ICD-10-PCS | Mod: CPTII,S$GLB,, | Performed by: GENERAL PRACTICE

## 2022-11-15 PROCEDURE — 3077F SYST BP >= 140 MM HG: CPT | Mod: CPTII,S$GLB,, | Performed by: GENERAL PRACTICE

## 2022-11-15 PROCEDURE — 3008F BODY MASS INDEX DOCD: CPT | Mod: CPTII,S$GLB,, | Performed by: GENERAL PRACTICE

## 2022-11-15 PROCEDURE — 3044F PR MOST RECENT HEMOGLOBIN A1C LEVEL <7.0%: ICD-10-PCS | Mod: CPTII,S$GLB,, | Performed by: GENERAL PRACTICE

## 2022-11-15 PROCEDURE — 93000 EKG 12-LEAD: ICD-10-PCS | Mod: S$GLB,,, | Performed by: GENERAL PRACTICE

## 2022-11-15 PROCEDURE — 3044F HG A1C LEVEL LT 7.0%: CPT | Mod: CPTII,S$GLB,, | Performed by: GENERAL PRACTICE

## 2022-11-15 RX ORDER — CLONIDINE HYDROCHLORIDE 0.1 MG/1
0.2 TABLET ORAL 3 TIMES DAILY
Qty: 180 TABLET | Refills: 11 | Status: SHIPPED | OUTPATIENT
Start: 2022-11-15 | End: 2022-11-29

## 2022-11-15 NOTE — PROGRESS NOTES
Subjective:    Patient ID:  João Ham is a 42 y.o. male who presents for follow-up of   Chief Complaint   Patient presents with    Hospital Follow Up     10/10/22       HPI:  10/10/22  João Ham is a 42 year old male with a past medical history of HTN, obesity, and anemia who presents with multiple weeks of worsening swelling to the legs and scrotum associated with worsening shortness of breath. The patient thought that the swelling possibly was secondary to his blood pressure medication, so he stopped taking these medications multiple days ago. He denies any chest pain or palpitations. He denies any drug, tobacco or alcohol use. He has never experienced swelling of this severity before. He denies any issues with urination. He does endorse fatigue, changes in appetite and nausea and vomiting. While in the ED, the patient was given IV calcium, Dilaudid, and labetalol. A Cardene infusion was also started. Hospital Medicine was consulted for admission.        * No surgery found *       Hospital Course:   João Ham is a 42 year old male with a past medical history of HTN, obesity, and anemia who presented with multiple weeks of worsening swelling to the legs and scrotum associated with worsening shortness of breath secondary to hypertensive emergency with acute renal failure, demand ischemia and pulmonary edema. He was started on a Cardene infusion for BP control. He was also started on IV Lasix and metolazone diuresis given volume overload. Nephrology and Cardiology have been consulted. A TTE shows grade III diastolic dysfunction. The patient is also hypocalcemic (likely from renal failure); calcium is being repleted as well as vitamin D; calcitriol has also been started.      Complement levels, as well as anti-GBM and other autoimmune antibodies were negative.  Nephrology was consulted, initially started patient on Cardene drip for which he was weaned off to oral antihypertensives along with the assistance of  Cardiology.  Patient underwent renal biopsy on 10/17 without incident.  His blood pressure was controlled at time of discharge.  He was discharged home with home health for help with his multiple medications, as well as follow-up with Nephrology, and primary care.       Chief Complaint: No chief complaint on file.        Transitional Care Note  Admit date: 10/10/22  Discharge date: 10/18/22  Date of interactive contact (2 business days post D/C): 10/19/22  Hospitalized at: [facility]  Discharge diagnoses: acute renal failure, diastolic dysfunction  Acute heart failure     Family and/or Caretaker present at visit?  No.  Diagnostic tests reviewed/disposition: I have reviewed all pending and diagnostic tests. Renal biopsy still in process  Disease/illness education: follow up with nephrology and   Medication changes: see belowMedication List     START taking these medications   calcitRIOL 0.25 MCG Cap  Commonly known as: ROCALTROL  Take 1 capsule (0.25 mcg total) by mouth once daily.  Start taking on: October 19, 2022     calcium carbonate 200 mg calcium (500 mg) chewable tablet  Commonly known as: TUMS  Take 2 tablets (1,000 mg total) by mouth 3 (three) times daily with meals.     cholecalciferol (vitamin D3) 125 mcg (5,000 unit) Tab  Take 1 tablet (5,000 Units total) by mouth once daily.  Start taking on: October 19, 2022     cloNIDine 0.1 MG tablet  Commonly known as: CATAPRES  Take 1 tablet (0.1 mg total) by mouth 3 (three) times daily.     furosemide 80 MG tablet  Commonly known as: LASIX  Take 1 tablet (80 mg total) by mouth 3 (three) times daily with meals.     hydrALAZINE 100 MG tablet  Commonly known as: APRESOLINE  Take 1 tablet (100 mg total) by mouth 3 (three) times daily.     isosorbide mononitrate 120 MG 24 hr tablet  Commonly known as: IMDUR  Take 1 tablet (120 mg total) by mouth once daily.  Start taking on: October 19, 2022     metoprolol succinate 50 MG 24 hr tablet  Commonly known as:  TOPROL-XL  Take 3 tablets (150 mg total) by mouth once daily.  Start taking on: October 19, 2022     sevelamer carbonate 800 mg Tab  Commonly known as: RENVELA  Take 2 tablets (1,600 mg total) by mouth 3 (three) times daily with meals.        STOP taking these medications   amlodipine-valsartan  mg per tablet  Commonly known as: EXFORGE     hydroCHLOROthiazide 25 MG tablet  Commonly known as: HYDRODIURIL  Home health/community services discussion/referrals: Patient has home health established at [facility].   Establishment or re-establishment of referral orders for community resources: No other necessary community resources.   Discussion with other health care providers: Patient needs follow up with nephrology and cardiology. .               Mr. Ham is a 42 year old male with HTN, recent GORGE, and edema who was hospitalized after presenting to the ED with swelling and shortness of breath. The patient was found to be in GORGE and acute heart failure with diastolic dysfunction. He was followed by cardiology and nephrology. The patient's medications were adjusted with improvement in swelling and symptoms. The patient did have renal biopsy; pathology is pending. The patient reports he is feeling much better since hospital discharge. The patient is tolerating his medications well. The patient has no additional complaints at this time. He does report he will need FMLA paper work completed. The patient also admit that he has had itching and throat swelling when eating mushrooms. The patient would like to see allergist.         [facility]  Discharge Summary        Patient Name: João Ham  MRN: [id number]  Patient Class: IP- Inpatient  Admission Date: 10/10/2022  Hospital Length of Stay: 8 days  Discharge Date and Time: 10/18/2022  3:57 PM  Attending Physician: No att. providers found   Discharging Provider: Mayito Chase MD  Primary Care Provider: Makenna Hooper PA-C        HPI:    João Ham is a 42 year old male with a past medical history of HTN, obesity, and anemia who presents with multiple weeks of worsening swelling to the legs and scrotum associated with worsening shortness of breath. The patient thought that the swelling possibly was secondary to his blood pressure medication, so he stopped taking these medications multiple days ago. He denies any chest pain or palpitations. He denies any drug, tobacco or alcohol use. He has never experienced swelling of this severity before. He denies any issues with urination. He does endorse fatigue, changes in appetite and nausea and vomiting. While in the ED, the patient was given IV calcium, Dilaudid, and labetalol. A Cardene infusion was also started. Hospital Medicine was consulted for admission.        * No surgery found *       Hospital Course:   João Ham is a 42 year old male with a past medical history of HTN, obesity, and anemia who presented with multiple weeks of worsening swelling to the legs and scrotum associated with worsening shortness of breath secondary to hypertensive emergency with acute renal failure, demand ischemia and pulmonary edema. He was started on a Cardene infusion for BP control. He was also started on IV Lasix and metolazone diuresis given volume overload. Nephrology and Cardiology have been consulted. A TTE shows grade III diastolic dysfunction. The patient is also hypocalcemic (likely from renal failure); calcium is being repleted as well as vitamin D; calcitriol has also been started.      Complement levels, as well as anti-GBM and other autoimmune antibodies were negative.  Nephrology was consulted, initially started patient on Cardene drip for which he was weaned off to oral antihypertensives along with the assistance of Cardiology.  Patient underwent renal biopsy on 10/17 without incident.  His blood pressure was controlled at time of discharge.  He was discharged home with home health for help with  his multiple medications, as well as follow-up with Nephrology, and primary care.        Goals of Care Treatment Preferences:  Code Status: Full Code        Consults:   Consults (From admission, onward)                                       Status  Ordering Provider                              Inpatient consult to Cardiology  Once       Provider:  Dawson Garsia MD              Completed       RYDER CONTRERAS                              Inpatient consult to Nephrology  Once       Provider:  Jeremy Madrid MD              Completed       RYDER CONTRERAS           No new Assessment & Plan notes have been filed under this hospital service since the last note was generated.  Service: Hospital Medicine     Final Active Diagnoses:               Diagnosis        Date Noted      POA  ·           PRINCIPAL PROBLEM:  Acute renal failure [N17.9] 10/10/2022      Yes  ·           Acute heart failure with preserved ejection fraction [I50.31]  10/10/2022      Yes  ·           Anemia [D64.9]           10/10/2022      Yes  ·           Severe obesity (BMI >= 40) [E66.01] 10/17/2022      Yes  ·           Malignant hypertensive urgency [I16.0]         10/10/2022      Yes  ·           Hypoalbuminemia [E88.09]    10/10/2022      Yes  ·           Obesity (BMI 30-39.9) [E66.9]            10/10/2022      Yes  ·           Elevated troponin [R77.8]       10/10/2022      Yes     Problems Resolved During this Admission:               Diagnosis        Date Noted      Date Resolved            POA  ·           Hypokalemia [E87.6]   10/12/2022      10/13/2022      No  ·           Hypocalcemia [E83.51]           10/10/2022      10/17/2022      Yes  ·           Acidosis, metabolic [E87.20]  10/10/2022      10/13/2022      Yes  ·           Elevated LFTs [R79.89]          10/10/2022      10/14/2022      Yes        Discharged Condition: stable     Disposition: Home-Health Care [facility]     Follow Up:                 Follow-up Information                   Makenna Hooper PA-C. Go to.   Specialty: Family Medicine  Why: APPOINTMENT:  October 24, 2022 at 7:00am  Hospital Follow up  Contact information:  [address] [place] [id number]  [phone]                                           Jeremy Madrid MD. Schedule an appointment as soon as possible for a visit in 1 week(s).   Specialty: Nephrology  Contact information:  [address]  [facility]  [place] [id number]  [phone]    11/15/22    He is here today for follow-up hospitalization for heart failure and edema.  He has been stressed out some over his disability papers and just got over turned today.  He is not seen Nephrology because of being out of network  His blood pressures been running high.    His clonidine was increased .2 t.i.d.    EKG today reveals normal sinus rhythm left atrial enlargement incomplete right bundle-branch block left anterior fascicular block nonspecific T-wave abnormality.      Review of patient's allergies indicates:  No Known Allergies    Past Medical History:   Diagnosis Date    Hypertension      History reviewed. No pertinent surgical history.  Social History     Tobacco Use    Smoking status: Never    Smokeless tobacco: Never     History reviewed. No pertinent family history.     Review of Systems:   Constitution: Negative for diaphoresis and fever.   HEENT: Negative for nosebleeds.    Cardiovascular: Negative for chest pain       No dyspnea on exertion       No leg swelling        No palpitations  Respiratory: Negative for shortness of breath and wheezing.    Hematologic/Lymphatic: Negative for bleeding problem. Does not bruise/bleed easily.   Skin: Negative for color change and rash.   Musculoskeletal: Negative for falls and myalgias.   Gastrointestinal: Negative for hematemesis and hematochezia.   Genitourinary: Negative for hematuria.   Neurological: Negative for dizziness and light-headedness.   Psychiatric/Behavioral: Negative for altered  mental status and memory loss.          Objective:        Vitals:    11/15/22 1205   BP: (!) 158/97   Pulse: (!) 53       Lab Results   Component Value Date    WBC 5.20 10/24/2022    HGB 9.5 (L) 10/24/2022    HCT 29.7 (L) 10/24/2022     10/24/2022    CHOL 141 10/11/2022    TRIG 66 10/11/2022    HDL 39 (L) 10/11/2022    ALT 25 10/18/2022    AST 11 10/18/2022     10/24/2022    K 4.4 10/24/2022     10/24/2022    CREATININE 7.6 (H) 10/24/2022    BUN 90 (H) 10/24/2022    CO2 27 10/24/2022    TSH 1.699 10/10/2022    INR 1.1 10/10/2022    HGBA1C 5.5 10/10/2022        ECHOCARDIOGRAM RESULTS  Results for orders placed during the hospital encounter of 10/10/22    Echo    Interpretation Summary  · The estimated PA systolic pressure is 48 mmHg.  · The left ventricle is normal in size with mild concentric hypertrophy and normal systolic function.  · Grade III left ventricular diastolic dysfunction.  · Moderate right ventricular enlargement with mildly to moderately reduced right ventricular systolic function.  · Severe left atrial enlargement.  · There is moderate pulmonary hypertension.  · Intermediate central venous pressure (8 mmHg).  · Mild-to-moderate mitral regurgitation.  · Moderate to severe tricuspid regurgitation.  · Moderate right atrial enlargement.  · The estimated ejection fraction is 60%.  · Atrial fibrillation not observed.        CURRENT/PREVIOUS VISIT EKG  Results for orders placed or performed during the hospital encounter of 10/10/22   EKG 12-lead    Collection Time: 10/13/22 11:32 AM    Narrative    Test Reason : R94.31,    Vent. Rate : 079 BPM     Atrial Rate : 079 BPM     P-R Int : 136 ms          QRS Dur : 096 ms      QT Int : 470 ms       P-R-T Axes : 061 -62 042 degrees     QTc Int : 538 ms    Normal sinus rhythm  Possible Left atrial enlargement  Incomplete right bundle branch block  Left anterior fascicular block  LVH clockwise rotation  Cannot rule out Septal infarct ,age  undetermined  T wave abnormality, consider anterolateral ischemia  Prolonged QT  Abnormal ECG  When compared with ECG of 10-OCT-2022 13:54,  Incomplete right bundle branch block is now Present  Minimal criteria for Septal infarct are now Present  Confirmed by Kip OLIVIA, Warren BAPTISTE ([id number]) on 10/19/2022 11:32:21 AM    Referred By: BRANDON   SELF           Confirmed By:Warren Shin MD     No valid procedures specified.   No results found for this or any previous visit.      Physical Exam:  CONSTITUTIONAL: No fever, no chills  HEENT: Normocephalic, atraumatic,pupils reactive to light                 NECK:  No JVD no carotid bruit  CVS: S1S2+, RRR, no murmurs,   LUNGS: Clear  ABDOMEN: Soft, NT, BS+  EXTREMITIES: No cyanosis, 2- 3 PLUS edema  : No ying catheter  NEURO: AAO X 3  PSY: Normal affect      Medication List with Changes/Refills   Current Medications    CALCITRIOL (ROCALTROL) 0.25 MCG CAP    Take 1 capsule (0.25 mcg total) by mouth once daily.    CALCIUM CARBONATE (TUMS) 200 MG CALCIUM (500 MG) CHEWABLE TABLET    Take 2 tablets (1,000 mg total) by mouth 3 (three) times daily with meals.    CHOLECALCIFEROL, VITAMIN D3, 125 MCG (5,000 UNIT) TAB    Take 1 tablet (5,000 Units total) by mouth once daily.    FUROSEMIDE (LASIX) 80 MG TABLET    Take 1 tablet (80 mg total) by mouth 3 (three) times daily with meals.    HYDRALAZINE (APRESOLINE) 100 MG TABLET    Take 1 tablet (100 mg total) by mouth 3 (three) times daily.    ISOSORBIDE MONONITRATE (IMDUR) 120 MG 24 HR TABLET    Take 1 tablet (120 mg total) by mouth once daily.    METOPROLOL SUCCINATE (TOPROL-XL) 50 MG 24 HR TABLET    Take 3 tablets (150 mg total) by mouth once daily.    SEVELAMER CARBONATE (RENVELA) 800 MG TAB    Take 2 tablets (1,600 mg total) by mouth 3 (three) times daily with meals.   Changed and/or Refilled Medications    Modified Medication Previous Medication    CLONIDINE (CATAPRES) 0.1 MG TABLET cloNIDine (CATAPRES) 0.1 MG tablet       Take 2  tablets (0.2 mg total) by mouth 3 (three) times daily.    Take 1 tablet (0.1 mg total) by mouth 3 (three) times daily.             Assessment:       1. CHF (congestive heart failure), NYHA class II, chronic, diastolic    2. GORGE (acute kidney injury)    3. Acute heart failure with preserved ejection fraction    4. Diastolic dysfunction    5. Hospital discharge follow-up    6. Primary hypertension    7. Pulmonary HTN    8. Anemia due to stage 4 chronic kidney disease    9. Obesity (BMI 30-39.9)         Plan:     Problem List Items Addressed This Visit          Cardiac/Vascular    Acute heart failure with preserved ejection fraction    Relevant Orders    IN OFFICE EKG 12-LEAD (to [place])       Oncology    Anemia       Endocrine    Obesity (BMI 30-39.9)     Other Visit Diagnoses       CHF (congestive heart failure), NYHA class II, chronic, diastolic    -  Primary    GORGE (acute kidney injury)        Diastolic dysfunction        Relevant Orders    IN OFFICE EKG 12-LEAD (to [place])    Hospital discharge follow-up        Primary hypertension        Pulmonary HTN              His blood pressure is still running little bit high but the clonidine he is just been increased 2.2 t.i.d..  We may need to go up 2.3 t.i.d..  His pressure stays in the 140s it is 02/01/2050 is probably acceptable.  He needs to confer with the nephrologist but does not have the appointment yet.  GFR on October 28th was 9.  His creatinine was 7.4, bun BUN is 90 .    No follow-ups on file.    The patients questions were answered, they verbalized understanding, and agreed with the treatment plan.     JAVED PEARSON MD  [facility]

## 2022-11-16 VITALS — SYSTOLIC BLOOD PRESSURE: 180 MMHG | DIASTOLIC BLOOD PRESSURE: 90 MMHG

## 2022-11-16 DIAGNOSIS — N17.9 AKI (ACUTE KIDNEY INJURY): Primary | ICD-10-CM

## 2022-11-16 DIAGNOSIS — I10 HYPERTENSION, UNSPECIFIED TYPE: ICD-10-CM

## 2022-11-16 RX ORDER — SEVELAMER CARBONATE 800 MG/1
1600 TABLET, FILM COATED ORAL
Qty: 180 TABLET | Refills: 11 | Status: CANCELLED | OUTPATIENT
Start: 2022-11-16 | End: 2023-11-16

## 2022-11-16 RX ORDER — CALCITRIOL 0.25 UG/1
0.25 CAPSULE ORAL DAILY
Qty: 30 CAPSULE | Refills: 0 | Status: SHIPPED | OUTPATIENT
Start: 2022-11-16 | End: 2023-01-11 | Stop reason: SDUPTHER

## 2022-11-16 NOTE — TELEPHONE ENCOUNTER
Spoke to pt states his BP has been elevated for several reasons. Pt states he is having trouble with insurance, disability and FMLA. Scheduled pt same day BP check with nurse and advised pt to bring at home machine. Pt also states he did not receive and Renvela or Calcitriol med that was prescribed while in the hospital. I spoke to pharmacy and verified both medications were picked up from pharmacy. Pharmacy advised the Renvela has refills and she is filling that now and will have it ready for  in an hour however the Calitriol does not have refills. Please advise if pt should be continuing the Calcitriol. Pt was notified and verbalizes understanding and states he never received these medications. Advised pt to contact pharmacy. Pt is scheduled to establish care with Dr. Granado 11/29/22. Please advise regarding refill on Calcitriol in the mean time.

## 2022-11-16 NOTE — TELEPHONE ENCOUNTER
----- Message from Sherry Avalos sent at 11/16/2022 10:52 AM CST -----  Regarding: MAGALIS  Name of Who is Calling: Anayeli from [facility]      What is the request in detail: Anayeli called to advise provider that the pt BP is 180/90 with no other symptoms. Pt is also out of his sevelamer carbonate (RENVELA) 800 mg Tab & calcitRIOL (ROCALTROL) 0.25 MCG Cap. Please advise.      [facility] [place] [address], [place] - [address] [place] [address]  [place] [address]  Phone: [phone] Fax: [phone]      Can the clinic reply by [facility]: no       What [facility] to [facility] Back [facility] not [facility]: [phone] ([place])

## 2022-11-16 NOTE — TELEPHONE ENCOUNTER
This is typically prescribed by nephrology- has he made this appt yet? Loki also needs repeat labs that are ordered in system to see if this medication continues to be necessary. Rx refilled for 30 days.

## 2022-11-22 ENCOUNTER — TELEPHONE (OUTPATIENT)
Dept: FAMILY MEDICINE | Facility: CLINIC | Age: 42
End: 2022-11-22
Payer: COMMERCIAL

## 2022-11-22 NOTE — TELEPHONE ENCOUNTER
----- Message from Sonja Aceves sent at 11/22/2022 10:18 AM CST -----  Contact: Matty from [facility]  Type: Needs Medical Advice  Who Called: Matty from [facility]  Best Call Back Number: [phone]  Additional Information: Matty from [facility] requesting a referral to be sent   Please fax to [phone]  Please contact Matty for any questions

## 2022-11-22 NOTE — TELEPHONE ENCOUNTER
----- Message from Linsey Morejon sent at 11/22/2022  2:46 PM CST -----  Regarding: pt call back  Contact: pt  Name of Who is Calling: KENTON ELENA [[id number]]       What is the request in detail: Pt is requesting a call back. States the physician in the Kidney and Hypertension department does not have availability until next year in March. States the disability paperwork is only good until 11/29/2022 and once the deadline is meet PCP will have to extend his disability date. Pt states it is urgent he speaks with someone in office. Please advise!        Can the clinic reply by [facility]: NO        [facility] [phone]

## 2022-11-22 NOTE — TELEPHONE ENCOUNTER
----- Message from Dion Allen sent at 11/22/2022 11:21 AM CST -----      Name of Who is Calling:PT/Saira with [facility] clinic          What is the request in detail:Saira is requesting a call back to discuss lab results for PT.          Can the clinic reply by [facility]:no          What Number to Call Back if not in [phone]

## 2022-11-23 ENCOUNTER — TELEPHONE (OUTPATIENT)
Dept: FAMILY MEDICINE | Facility: CLINIC | Age: 42
End: 2022-11-23
Payer: COMMERCIAL

## 2022-11-23 NOTE — TELEPHONE ENCOUNTER
----- Message from Yue Sapp sent at 11/23/2022  9:35 AM CST -----  Contact: Dr. Everett Kwan's office - [place]  Type: Patient Call Back         Who called: Dr. Everett Kwan's office - [place]         What is the request in detail: requesting medical notes to go along with referral that was received for N17.9 (ICD-10-CM) - GORGE (acute kidney injury) and I10 (ICD-10-CM) - Hypertension, unspecified type; please advise        Best call back number: [phone]         Additional Information: fax [phone] marisela Kelly           Thank You

## 2022-11-25 ENCOUNTER — TELEPHONE (OUTPATIENT)
Dept: ALLERGY | Facility: CLINIC | Age: 42
End: 2022-11-25
Payer: COMMERCIAL

## 2022-11-25 NOTE — TELEPHONE ENCOUNTER
Called patient to assist with scheduling new patient appointment. Patient is now scheduled for 11/30/2022 at 2pm

## 2022-11-29 ENCOUNTER — LAB VISIT (OUTPATIENT)
Dept: LAB | Facility: HOSPITAL | Age: 42
End: 2022-11-29
Attending: STUDENT IN AN ORGANIZED HEALTH CARE EDUCATION/TRAINING PROGRAM
Payer: COMMERCIAL

## 2022-11-29 ENCOUNTER — TELEPHONE (OUTPATIENT)
Dept: FAMILY MEDICINE | Facility: CLINIC | Age: 42
End: 2022-11-29
Payer: COMMERCIAL

## 2022-11-29 ENCOUNTER — OFFICE VISIT (OUTPATIENT)
Dept: FAMILY MEDICINE | Facility: CLINIC | Age: 42
End: 2022-11-29
Payer: COMMERCIAL

## 2022-11-29 VITALS
HEIGHT: 74 IN | SYSTOLIC BLOOD PRESSURE: 146 MMHG | BODY MASS INDEX: 36.22 KG/M2 | WEIGHT: 282.19 LBS | RESPIRATION RATE: 16 BRPM | HEART RATE: 70 BPM | OXYGEN SATURATION: 99 % | TEMPERATURE: 98 F | DIASTOLIC BLOOD PRESSURE: 68 MMHG

## 2022-11-29 DIAGNOSIS — I27.20 PULMONARY HYPERTENSION: ICD-10-CM

## 2022-11-29 DIAGNOSIS — N18.5 CKD (CHRONIC KIDNEY DISEASE) STAGE 5, GFR LESS THAN 15 ML/MIN: ICD-10-CM

## 2022-11-29 DIAGNOSIS — I13.0 HYPERTENSIVE HEART AND RENAL DISEASE WITH CONGESTIVE HEART FAILURE: ICD-10-CM

## 2022-11-29 DIAGNOSIS — D64.9 NORMOCYTIC ANEMIA: ICD-10-CM

## 2022-11-29 DIAGNOSIS — E66.01 SEVERE OBESITY WITH BODY MASS INDEX (BMI) OF 35.0 TO 39.9 WITH COMORBIDITY: ICD-10-CM

## 2022-11-29 DIAGNOSIS — Z00.00 HEALTHCARE MAINTENANCE: Primary | ICD-10-CM

## 2022-11-29 DIAGNOSIS — I10 HYPERTENSION, UNSPECIFIED TYPE: ICD-10-CM

## 2022-11-29 PROCEDURE — 1159F PR MEDICATION LIST DOCUMENTED IN MEDICAL RECORD: ICD-10-PCS | Mod: CPTII,S$GLB,, | Performed by: STUDENT IN AN ORGANIZED HEALTH CARE EDUCATION/TRAINING PROGRAM

## 2022-11-29 PROCEDURE — 3077F SYST BP >= 140 MM HG: CPT | Mod: CPTII,S$GLB,, | Performed by: STUDENT IN AN ORGANIZED HEALTH CARE EDUCATION/TRAINING PROGRAM

## 2022-11-29 PROCEDURE — 82728 ASSAY OF FERRITIN: CPT | Performed by: STUDENT IN AN ORGANIZED HEALTH CARE EDUCATION/TRAINING PROGRAM

## 2022-11-29 PROCEDURE — 3008F BODY MASS INDEX DOCD: CPT | Mod: CPTII,S$GLB,, | Performed by: STUDENT IN AN ORGANIZED HEALTH CARE EDUCATION/TRAINING PROGRAM

## 2022-11-29 PROCEDURE — 99214 PR OFFICE/OUTPT VISIT, EST, LEVL IV, 30-39 MIN: ICD-10-PCS | Mod: S$GLB,,, | Performed by: STUDENT IN AN ORGANIZED HEALTH CARE EDUCATION/TRAINING PROGRAM

## 2022-11-29 PROCEDURE — 3078F PR MOST RECENT DIASTOLIC BLOOD PRESSURE < 80 MM HG: ICD-10-PCS | Mod: CPTII,S$GLB,, | Performed by: STUDENT IN AN ORGANIZED HEALTH CARE EDUCATION/TRAINING PROGRAM

## 2022-11-29 PROCEDURE — 1159F MED LIST DOCD IN RCRD: CPT | Mod: CPTII,S$GLB,, | Performed by: STUDENT IN AN ORGANIZED HEALTH CARE EDUCATION/TRAINING PROGRAM

## 2022-11-29 PROCEDURE — 3078F DIAST BP <80 MM HG: CPT | Mod: CPTII,S$GLB,, | Performed by: STUDENT IN AN ORGANIZED HEALTH CARE EDUCATION/TRAINING PROGRAM

## 2022-11-29 PROCEDURE — 99999 PR PBB SHADOW E&M-EST. PATIENT-LVL V: CPT | Mod: PBBFAC,,, | Performed by: STUDENT IN AN ORGANIZED HEALTH CARE EDUCATION/TRAINING PROGRAM

## 2022-11-29 PROCEDURE — 3044F HG A1C LEVEL LT 7.0%: CPT | Mod: CPTII,S$GLB,, | Performed by: STUDENT IN AN ORGANIZED HEALTH CARE EDUCATION/TRAINING PROGRAM

## 2022-11-29 PROCEDURE — 3044F PR MOST RECENT HEMOGLOBIN A1C LEVEL <7.0%: ICD-10-PCS | Mod: CPTII,S$GLB,, | Performed by: STUDENT IN AN ORGANIZED HEALTH CARE EDUCATION/TRAINING PROGRAM

## 2022-11-29 PROCEDURE — 80069 RENAL FUNCTION PANEL: CPT | Performed by: STUDENT IN AN ORGANIZED HEALTH CARE EDUCATION/TRAINING PROGRAM

## 2022-11-29 PROCEDURE — 3077F PR MOST RECENT SYSTOLIC BLOOD PRESSURE >= 140 MM HG: ICD-10-PCS | Mod: CPTII,S$GLB,, | Performed by: STUDENT IN AN ORGANIZED HEALTH CARE EDUCATION/TRAINING PROGRAM

## 2022-11-29 PROCEDURE — 36415 COLL VENOUS BLD VENIPUNCTURE: CPT | Mod: PO | Performed by: STUDENT IN AN ORGANIZED HEALTH CARE EDUCATION/TRAINING PROGRAM

## 2022-11-29 PROCEDURE — 84466 ASSAY OF TRANSFERRIN: CPT | Performed by: STUDENT IN AN ORGANIZED HEALTH CARE EDUCATION/TRAINING PROGRAM

## 2022-11-29 PROCEDURE — 3008F PR BODY MASS INDEX (BMI) DOCUMENTED: ICD-10-PCS | Mod: CPTII,S$GLB,, | Performed by: STUDENT IN AN ORGANIZED HEALTH CARE EDUCATION/TRAINING PROGRAM

## 2022-11-29 PROCEDURE — 99999 PR PBB SHADOW E&M-EST. PATIENT-LVL V: ICD-10-PCS | Mod: PBBFAC,,, | Performed by: STUDENT IN AN ORGANIZED HEALTH CARE EDUCATION/TRAINING PROGRAM

## 2022-11-29 PROCEDURE — 99214 OFFICE O/P EST MOD 30 MIN: CPT | Mod: S$GLB,,, | Performed by: STUDENT IN AN ORGANIZED HEALTH CARE EDUCATION/TRAINING PROGRAM

## 2022-11-29 RX ORDER — CLONIDINE HYDROCHLORIDE 0.3 MG/1
0.3 TABLET ORAL 3 TIMES DAILY
Qty: 270 TABLET | Refills: 1 | Status: SHIPPED | OUTPATIENT
Start: 2022-11-29 | End: 2023-03-22 | Stop reason: SDUPTHER

## 2022-11-29 NOTE — PROGRESS NOTES
[facility] Note    Subjective:    The HPI and pertinent ROS is included in the Diagnostic Impression Remarks section at the end of the note. Please see below for further details. Chief complaint is at end of note.     KENTON is a pleasant intelligent patient who is here for evaluation.     Modified Medications    No medications on file       Data reviewed 274}  Previous medical records reviewed and summarized in plan section at end of note.      If you are due for any health screening(s) below please notify me so we can arrange them to be ordered and scheduled. Most healthy patients at your age complete them, but you are free to accept or refuse. If you can't do it, I'll definitely understand. If you can, I'd certainly appreciate it!     Tests to Keep You Healthy    Last Blood Pressure <= 139/89 (11/29/2022): NO      The following portions of the patient's history were reviewed and updated as appropriate: allergies, current medications, past family history, past medical history, past social history, past surgical history and problem list.    He  has a past medical history of Hypertension.  He  has no past surgical history on file.    He  reports that he has never smoked. He has never been exposed to tobacco smoke. He has never used smokeless tobacco.  He family history is not on file.    Review of patient's allergies indicates:  No Known Allergies    Tobacco Use: Low Risk     Smoking Tobacco Use: Never    Smokeless Tobacco Use: Never    Passive Exposure: Never     Physical Examination  General appearance: alert, cooperative, no distress  Neck: no thyromegaly, no neck stiffness  Lungs: clear to auscultation, no wheezes, rales or rhonchi, symmetric air entry  Heart: normal rate, regular rhythm, normal S1, S2, no murmurs, rubs, clicks or gallops  Abdomen: soft, nontender, nondistended, no rigidity, rebound, or guarding.   Back: no point tenderness over spine  Extremities: peripheral pulses normal, no  "unilateral leg swelling or calf tenderness   Neurological:alert, oriented, normal speech, no new focal findings or movement disorder noted from baseline    BP Readings from Last 3 Encounters:   11/29/22 (!) 146/68   11/16/22 (!) 180/90   11/15/22 (!) 158/97     Wt Readings from Last 3 Encounters:   11/29/22 128 kg (282 lb 3 oz)   11/15/22 129.1 kg (284 lb 8.1 oz)   10/24/22 123.6 kg (272 lb 7.8 oz)     BP (!) 146/68 (BP Location: Right arm, Patient Position: Sitting, BP Method: Large (Manual))   Pulse 70   Temp 98.2 °F (36.8 °C) (Oral)   Resp 16   Ht 6' 2" (1.88 m)   Wt 128 kg (282 lb 3 oz)   SpO2 99%   BMI 36.23 kg/m²    274}  Laboratory: I have reviewed old labs below:    274}    Lab Results   Component Value Date    WBC 5.20 10/24/2022    HGB 9.5 (L) 10/24/2022    HCT 29.7 (L) 10/24/2022    MCV 88 10/24/2022     10/24/2022     10/24/2022    K 4.4 10/24/2022     10/24/2022    CALCIUM 8.6 (L) 10/24/2022    PHOS 6.3 (H) 10/18/2022    CO2 27 10/24/2022    GLU 99 10/24/2022    BUN 90 (H) 10/24/2022    CREATININE 7.6 (H) 10/24/2022    ANIONGAP 15 10/24/2022    PROT 6.3 10/18/2022    ALBUMIN 2.7 (L) 10/18/2022    BILITOT 0.3 10/18/2022    ALKPHOS 64 10/18/2022    ALT 25 10/18/2022    AST 11 10/18/2022    INR 1.1 10/10/2022    CHOL 141 10/11/2022    TRIG 66 10/11/2022    HDL 39 (L) 10/11/2022    LDLCALC 88.8 10/11/2022    TSH 1.699 10/10/2022    HGBA1C 5.5 10/10/2022     Lab reviewed by me: Particular labs of significance that I will monitor, workup, or treat to improve are mentioned below in diagnostic impression remarks.    Imaging/EKG: I have reviewed the pertinent results and my findings are noted in remarks.  274}    CC:   Chief Complaint   Patient presents with    [facility]    Hypertension        274}    Assessment/Plan  João Ham is a 42 y.o. male who presents to clinic with:  1. Healthcare maintenance    2. Hypertensive heart and renal disease with congestive heart failure    3. " "Pulmonary hypertension    4. CKD (chronic kidney disease) stage 5, GFR less than 15 ml/min    5. Severe obesity with body mass index (BMI) of 35.0 to 39.9 with comorbidity       274}  Diagnostic Impression Remarks + HPI     Documentation entered by me for this encounter may have been done in part using speech-recognition technology. Although I have made an effort to ensure accuracy, "sound like" errors may exist and should be interpreted in context.     Hypertensive heart and renal disease with CHF-will try to control blood pressure better will increase clonidine and continue diuretics recommend follow-up with Nephrology will consider Arb inhibitor  Hypertension-will increase clonidine dose from 0.2 mg 3 times per day to 0.3 pre times per day.  Patient is okay with the pills and is compliant does not want the patch.  Will continue beta-blocker and hydralazine.  Will consider adding on a Ace or Arb inhibitor but will have to check potassium and be very careful    CHF-stable continue diuretic swellings improve some no shortness of breath will avoid Aldactone due to potassium risk will consider an Arb inhibitor    Anemia-likely anemia chronic disease will check iron levels    CKD-control uncertain needs to see Nephrology as quickly as possible bicarb is okay potassium normal recheck continue to work on blood pressure        Severe obesity with comorbidity-Hypertension to which the severe obesity is a contributing factor. This is consistent with the definition of severe obesity with comorbidity. The patient's severe obesity was monitored, evaluated, addressed and/or treated. Diet and exercise recommended see counseling section for further info.     This is the extent of this pleasant patient's concerns at this present time. He did not feel chest pain upon exertion, dyspnea, nausea, vomiting, diaphoresis, or syncope. No pleuritic chest pain, unilateral leg swelling, calf tenderness, or calf pain. Negative for " unintentional weight loss night sweats and fevers. João will return to clinic in a few months for further workup and reassessment or sooner as needed. He was instructed to call the clinic or go to the emergency department or urgent care immediately if his symptoms do not improve, worsens, or if any new symptoms develop. As we discussed that symptoms could worsen over the next 24 hours he was advised that if any increased swelling, pain, or numbness arise to go immediately to the ED. Patient knows to call any time if an emergency arises. Shared decision making occurred and he verbalized understanding in agreement with this plan. I discussed imaging findings, diagnosis, possibilities, treatment options, medications, risks, and benefits. He had many questions regarding the options and long-term effects. All questions were answered. He expressed understanding after counseling regarding the diagnosis and recommendations. He was capable and demonstrated competence with understanding of these options. Shared decision making was performed resulting in him choosing the current treatment plan. Patient handout was given with instructions and recommendations. Advised the patient that if they become pregnant to alert us immediately to assess for medication changes. I also discussed the importance of close follow up to discuss labs, change or modify his medications if needed, monitor side effects, and further evaluation of medical problems.     Additional workup planned: see labs ordered below.    See below for labs and meds ordered with associated diagnosis      1. Healthcare maintenance    2. Hypertensive heart and renal disease with congestive heart failure  - Ambulatory referral/consult to Social Work; Future    3. Pulmonary hypertension  - Ambulatory referral/consult to Social Work; Future    4. CKD (chronic kidney disease) stage 5, GFR less than 15 ml/min  - Ambulatory referral/consult to Nephrology; Future  - Ambulatory  referral/consult to Social Work; Future    5. Severe obesity with body mass index (BMI) of 35.0 to 39.9 with comorbidity    Dawson Granado MD   274}    If you are due for any health screening(s) below please notify me so we can arrange them to be ordered and scheduled. Most healthy patients at your age complete them, but you are free to accept or refuse.     If you can't do it, I'll definitely understand. If you can, I'd certainly appreciate it!   Tests to Keep You Healthy    Last Blood Pressure <= 139/89 (11/29/2022): NO

## 2022-11-29 NOTE — TELEPHONE ENCOUNTER
A 2nd attempt has been made to establish contact with João Ham  via [employer]. The final contact attempt will be made in 5 business days

## 2022-11-29 NOTE — TELEPHONE ENCOUNTER
----- Message from Michelle Olivia sent at 11/29/2022  1:03 PM CST -----  Who Called:pt       What is the reqeust in detail: is requesting a call back ,  about appt and need pressure check . Please advise       Can the clinic reply by [facility]? NO       Best Call Back Number:[phone]      Additional Information:

## 2022-11-30 ENCOUNTER — TELEPHONE (OUTPATIENT)
Dept: FAMILY MEDICINE | Facility: CLINIC | Age: 42
End: 2022-11-30

## 2022-11-30 ENCOUNTER — LAB VISIT (OUTPATIENT)
Dept: LAB | Facility: HOSPITAL | Age: 42
End: 2022-11-30
Attending: STUDENT IN AN ORGANIZED HEALTH CARE EDUCATION/TRAINING PROGRAM
Payer: COMMERCIAL

## 2022-11-30 ENCOUNTER — OFFICE VISIT (OUTPATIENT)
Dept: ALLERGY | Facility: CLINIC | Age: 42
End: 2022-11-30
Payer: COMMERCIAL

## 2022-11-30 VITALS — WEIGHT: 288.56 LBS | HEIGHT: 74 IN | BODY MASS INDEX: 37.03 KG/M2

## 2022-11-30 DIAGNOSIS — J31.0 RHINITIS, UNSPECIFIED TYPE: ICD-10-CM

## 2022-11-30 DIAGNOSIS — Z91.018 FOOD ALLERGY: ICD-10-CM

## 2022-11-30 DIAGNOSIS — E83.39 SERUM PHOSPHATE ELEVATED: Primary | ICD-10-CM

## 2022-11-30 DIAGNOSIS — Z91.018 FOOD ALLERGY: Primary | ICD-10-CM

## 2022-11-30 LAB
ALBUMIN SERPL BCP-MCNC: 3.4 G/DL (ref 3.5–5.2)
ANION GAP SERPL CALC-SCNC: 12 MMOL/L (ref 8–16)
BUN SERPL-MCNC: 70 MG/DL (ref 6–20)
CALCIUM SERPL-MCNC: 8.7 MG/DL (ref 8.7–10.5)
CHLORIDE SERPL-SCNC: 103 MMOL/L (ref 95–110)
CO2 SERPL-SCNC: 27 MMOL/L (ref 23–29)
CREAT SERPL-MCNC: 7.1 MG/DL (ref 0.5–1.4)
EST. GFR  (NO RACE VARIABLE): 9.2 ML/MIN/1.73 M^2
FERRITIN SERPL-MCNC: 39 NG/ML (ref 20–300)
GLUCOSE SERPL-MCNC: 70 MG/DL (ref 70–110)
IRON SERPL-MCNC: 50 UG/DL (ref 45–160)
PHOSPHATE SERPL-MCNC: 5.5 MG/DL (ref 2.7–4.5)
POTASSIUM SERPL-SCNC: 4.2 MMOL/L (ref 3.5–5.1)
SATURATED IRON: 11 % (ref 20–50)
SODIUM SERPL-SCNC: 142 MMOL/L (ref 136–145)
TOTAL IRON BINDING CAPACITY: 435 UG/DL (ref 250–450)
TRANSFERRIN SERPL-MCNC: 294 MG/DL (ref 200–375)

## 2022-11-30 PROCEDURE — 86003 ALLG SPEC IGE CRUDE XTRC EA: CPT | Mod: 59 | Performed by: STUDENT IN AN ORGANIZED HEALTH CARE EDUCATION/TRAINING PROGRAM

## 2022-11-30 PROCEDURE — 1159F PR MEDICATION LIST DOCUMENTED IN MEDICAL RECORD: ICD-10-PCS | Mod: CPTII,S$GLB,, | Performed by: STUDENT IN AN ORGANIZED HEALTH CARE EDUCATION/TRAINING PROGRAM

## 2022-11-30 PROCEDURE — 1159F MED LIST DOCD IN RCRD: CPT | Mod: CPTII,S$GLB,, | Performed by: STUDENT IN AN ORGANIZED HEALTH CARE EDUCATION/TRAINING PROGRAM

## 2022-11-30 PROCEDURE — 82785 ASSAY OF IGE: CPT | Performed by: STUDENT IN AN ORGANIZED HEALTH CARE EDUCATION/TRAINING PROGRAM

## 2022-11-30 PROCEDURE — 99204 PR OFFICE/OUTPT VISIT, NEW, LEVL IV, 45-59 MIN: ICD-10-PCS | Mod: S$GLB,,, | Performed by: STUDENT IN AN ORGANIZED HEALTH CARE EDUCATION/TRAINING PROGRAM

## 2022-11-30 PROCEDURE — 3044F HG A1C LEVEL LT 7.0%: CPT | Mod: CPTII,S$GLB,, | Performed by: STUDENT IN AN ORGANIZED HEALTH CARE EDUCATION/TRAINING PROGRAM

## 2022-11-30 PROCEDURE — 36415 COLL VENOUS BLD VENIPUNCTURE: CPT | Mod: PO | Performed by: STUDENT IN AN ORGANIZED HEALTH CARE EDUCATION/TRAINING PROGRAM

## 2022-11-30 PROCEDURE — 99204 OFFICE O/P NEW MOD 45 MIN: CPT | Mod: S$GLB,,, | Performed by: STUDENT IN AN ORGANIZED HEALTH CARE EDUCATION/TRAINING PROGRAM

## 2022-11-30 PROCEDURE — 99999 PR PBB SHADOW E&M-EST. PATIENT-LVL III: ICD-10-PCS | Mod: PBBFAC,,, | Performed by: STUDENT IN AN ORGANIZED HEALTH CARE EDUCATION/TRAINING PROGRAM

## 2022-11-30 PROCEDURE — 3008F PR BODY MASS INDEX (BMI) DOCUMENTED: ICD-10-PCS | Mod: CPTII,S$GLB,, | Performed by: STUDENT IN AN ORGANIZED HEALTH CARE EDUCATION/TRAINING PROGRAM

## 2022-11-30 PROCEDURE — 99999 PR PBB SHADOW E&M-EST. PATIENT-LVL III: CPT | Mod: PBBFAC,,, | Performed by: STUDENT IN AN ORGANIZED HEALTH CARE EDUCATION/TRAINING PROGRAM

## 2022-11-30 PROCEDURE — 86003 ALLG SPEC IGE CRUDE XTRC EA: CPT | Performed by: STUDENT IN AN ORGANIZED HEALTH CARE EDUCATION/TRAINING PROGRAM

## 2022-11-30 PROCEDURE — 3044F PR MOST RECENT HEMOGLOBIN A1C LEVEL <7.0%: ICD-10-PCS | Mod: CPTII,S$GLB,, | Performed by: STUDENT IN AN ORGANIZED HEALTH CARE EDUCATION/TRAINING PROGRAM

## 2022-11-30 PROCEDURE — 3008F BODY MASS INDEX DOCD: CPT | Mod: CPTII,S$GLB,, | Performed by: STUDENT IN AN ORGANIZED HEALTH CARE EDUCATION/TRAINING PROGRAM

## 2022-12-01 LAB — IGE SERPL-ACNC: 96 IU/ML (ref 0–100)

## 2022-12-05 ENCOUNTER — OFFICE VISIT (OUTPATIENT)
Dept: NEPHROLOGY | Facility: CLINIC | Age: 42
End: 2022-12-05
Payer: COMMERCIAL

## 2022-12-05 VITALS
WEIGHT: 288.56 LBS | OXYGEN SATURATION: 97 % | HEART RATE: 59 BPM | HEIGHT: 74 IN | SYSTOLIC BLOOD PRESSURE: 158 MMHG | BODY MASS INDEX: 37.03 KG/M2 | DIASTOLIC BLOOD PRESSURE: 70 MMHG

## 2022-12-05 DIAGNOSIS — R80.1 PERSISTENT PROTEINURIA: ICD-10-CM

## 2022-12-05 DIAGNOSIS — D63.1 ANEMIA DUE TO STAGE 5 CHRONIC KIDNEY DISEASE, NOT ON CHRONIC DIALYSIS: ICD-10-CM

## 2022-12-05 DIAGNOSIS — E66.01 SEVERE OBESITY WITH BODY MASS INDEX (BMI) OF 35.0 TO 39.9 WITH COMORBIDITY: ICD-10-CM

## 2022-12-05 DIAGNOSIS — N18.5 STAGE 5 CHRONIC KIDNEY DISEASE NOT ON CHRONIC DIALYSIS: ICD-10-CM

## 2022-12-05 DIAGNOSIS — N18.5 CKD (CHRONIC KIDNEY DISEASE) STAGE 5, GFR LESS THAN 15 ML/MIN: ICD-10-CM

## 2022-12-05 DIAGNOSIS — I50.31 ACUTE HEART FAILURE WITH PRESERVED EJECTION FRACTION: ICD-10-CM

## 2022-12-05 DIAGNOSIS — I13.0 HYPERTENSIVE HEART AND RENAL DISEASE WITH CONGESTIVE HEART FAILURE: ICD-10-CM

## 2022-12-05 DIAGNOSIS — N18.5 ANEMIA DUE TO STAGE 5 CHRONIC KIDNEY DISEASE, NOT ON CHRONIC DIALYSIS: ICD-10-CM

## 2022-12-05 DIAGNOSIS — I27.20 PULMONARY HYPERTENSION: Primary | ICD-10-CM

## 2022-12-05 LAB
A ALTERNATA IGE QN: <0.1 KU/L
A FUMIGATUS IGE QN: <0.1 KU/L
BERMUDA GRASS IGE QN: 2.57 KU/L
CAT DANDER IGE QN: <0.1 KU/L
CEDAR IGE QN: 0.16 KU/L
D FARINAE IGE QN: 0.1 KU/L
D PTERONYSS IGE QN: 0.13 KU/L
DEPRECATED A ALTERNATA IGE RAST QL: NORMAL
DEPRECATED A FUMIGATUS IGE RAST QL: NORMAL
DEPRECATED BERMUDA GRASS IGE RAST QL: ABNORMAL
DEPRECATED CAT DANDER IGE RAST QL: NORMAL
DEPRECATED CEDAR IGE RAST QL: ABNORMAL
DEPRECATED D FARINAE IGE RAST QL: NORMAL
DEPRECATED D PTERONYSS IGE RAST QL: ABNORMAL
DEPRECATED ELDER IGE RAST QL: ABNORMAL
DEPRECATED ENGL PLANTAIN IGE RAST QL: ABNORMAL
DEPRECATED PECAN/HICK TREE IGE RAST QL: ABNORMAL
DEPRECATED ROACH IGE RAST QL: NORMAL
DEPRECATED TIMOTHY IGE RAST QL: ABNORMAL
DEPRECATED WEST RAGWEED IGE RAST QL: ABNORMAL
DEPRECATED WHITE OAK IGE RAST QL: ABNORMAL
ELDER IGE QN: 0.11 KU/L
ENGL PLANTAIN IGE QN: 0.66 KU/L
PECAN/HICK TREE IGE QN: 0.62 KU/L
ROACH IGE QN: <0.1 KU/L
TIMOTHY IGE QN: 6.39 KU/L
WEST RAGWEED IGE QN: 1.52 KU/L
WHITE OAK IGE QN: 0.46 KU/L

## 2022-12-05 PROCEDURE — 1160F PR REVIEW ALL MEDS BY PRESCRIBER/CLIN PHARMACIST DOCUMENTED: ICD-10-PCS | Mod: CPTII,S$GLB,, | Performed by: INTERNAL MEDICINE

## 2022-12-05 PROCEDURE — 3078F PR MOST RECENT DIASTOLIC BLOOD PRESSURE < 80 MM HG: ICD-10-PCS | Mod: CPTII,S$GLB,, | Performed by: INTERNAL MEDICINE

## 2022-12-05 PROCEDURE — 99205 PR OFFICE/OUTPT VISIT, NEW, LEVL V, 60-74 MIN: ICD-10-PCS | Mod: S$GLB,,, | Performed by: INTERNAL MEDICINE

## 2022-12-05 PROCEDURE — 3077F SYST BP >= 140 MM HG: CPT | Mod: CPTII,S$GLB,, | Performed by: INTERNAL MEDICINE

## 2022-12-05 PROCEDURE — 99999 PR PBB SHADOW E&M-EST. PATIENT-LVL V: ICD-10-PCS | Mod: PBBFAC,,, | Performed by: INTERNAL MEDICINE

## 2022-12-05 PROCEDURE — 1159F PR MEDICATION LIST DOCUMENTED IN MEDICAL RECORD: ICD-10-PCS | Mod: CPTII,S$GLB,, | Performed by: INTERNAL MEDICINE

## 2022-12-05 PROCEDURE — 3066F NEPHROPATHY DOC TX: CPT | Mod: CPTII,S$GLB,, | Performed by: INTERNAL MEDICINE

## 2022-12-05 PROCEDURE — 99205 OFFICE O/P NEW HI 60 MIN: CPT | Mod: S$GLB,,, | Performed by: INTERNAL MEDICINE

## 2022-12-05 PROCEDURE — 1160F RVW MEDS BY RX/DR IN RCRD: CPT | Mod: CPTII,S$GLB,, | Performed by: INTERNAL MEDICINE

## 2022-12-05 PROCEDURE — 3044F HG A1C LEVEL LT 7.0%: CPT | Mod: CPTII,S$GLB,, | Performed by: INTERNAL MEDICINE

## 2022-12-05 PROCEDURE — 3044F PR MOST RECENT HEMOGLOBIN A1C LEVEL <7.0%: ICD-10-PCS | Mod: CPTII,S$GLB,, | Performed by: INTERNAL MEDICINE

## 2022-12-05 PROCEDURE — 99999 PR PBB SHADOW E&M-EST. PATIENT-LVL V: CPT | Mod: PBBFAC,,, | Performed by: INTERNAL MEDICINE

## 2022-12-05 PROCEDURE — 3008F BODY MASS INDEX DOCD: CPT | Mod: CPTII,S$GLB,, | Performed by: INTERNAL MEDICINE

## 2022-12-05 PROCEDURE — 3077F PR MOST RECENT SYSTOLIC BLOOD PRESSURE >= 140 MM HG: ICD-10-PCS | Mod: CPTII,S$GLB,, | Performed by: INTERNAL MEDICINE

## 2022-12-05 PROCEDURE — 3066F PR DOCUMENTATION OF TREATMENT FOR NEPHROPATHY: ICD-10-PCS | Mod: CPTII,S$GLB,, | Performed by: INTERNAL MEDICINE

## 2022-12-05 PROCEDURE — 3008F PR BODY MASS INDEX (BMI) DOCUMENTED: ICD-10-PCS | Mod: CPTII,S$GLB,, | Performed by: INTERNAL MEDICINE

## 2022-12-05 PROCEDURE — 1159F MED LIST DOCD IN RCRD: CPT | Mod: CPTII,S$GLB,, | Performed by: INTERNAL MEDICINE

## 2022-12-05 PROCEDURE — 3078F DIAST BP <80 MM HG: CPT | Mod: CPTII,S$GLB,, | Performed by: INTERNAL MEDICINE

## 2022-12-05 NOTE — PROGRESS NOTES
Subjective:       Patient ID: João Ham is a 42 y.o. Black or  male who presents for initial evaluation of CKD referred by PCP, dr Granado.     Renal function per chart review with sr cr baseline of 7 mg/dL with increased proteinuria. Recent hospitalization for heart failure. Renal biopsy with arterionephrosclerosis.    - dHF, echo in 2022: Left ventricle is normal in size with mild concentric   hypertrophy and normal systolic function. Grade III left ventricular  diastolic dysfunction. Moderate right ventricular enlargement with mildly to moderately reduced right ventricular systolic function. Severe left atrial enlargement. There is moderate pulmonary hypertension.   - HTN diagnosed about 3 years ago, but untreated prior. Patient reports good adherence to the current regiment, which includes clonidine, metoprolol, hydralazine, lasix, imdur. They are following low salt diet. Never hospitalized for uncontrolled HTN or hypotension/syncopal episodes.  - Hyperaldo: 67.5    Denies use of NSAIDs, nephrolithiasis or fam Hx of renal disease.      Review of Systems   Constitutional:  Negative for chills, fatigue and fever.   HENT:  Negative for hearing loss, trouble swallowing and voice change.    Respiratory:  Negative for cough, shortness of breath and wheezing.    Cardiovascular:  Positive for leg swelling. Negative for chest pain and palpitations.   Gastrointestinal:  Negative for abdominal distention, abdominal pain, constipation and diarrhea.   Endocrine: Negative for polydipsia, polyphagia and polyuria.   Genitourinary:  Negative for dysuria, flank pain, frequency and hematuria.   Musculoskeletal:  Negative for arthralgias, back pain and myalgias.   Skin:  Negative for rash and wound.   Neurological:  Negative for dizziness, syncope, weakness and light-headedness.   Hematological:  Negative for adenopathy. Does not bruise/bleed easily.     The past medical, family and social histories were reviewed  "for this encounter.     BP (!) 158/70 (BP Location: Right arm, Patient Position: Sitting, BP Method: Large (Manual))   Pulse (!) 59   Ht 6' 2" (1.88 m)   Wt 130.9 kg (288 lb 9.3 oz)   SpO2 97%   BMI 37.05 kg/m²     Objective:      Physical Exam  Vitals reviewed.   Constitutional:       General: He is not in acute distress.     Appearance: He is well-developed. He is obese. He is not ill-appearing.   HENT:      Head: Normocephalic and atraumatic.      Mouth/Throat:      Mouth: Mucous membranes are moist.      Pharynx: Oropharynx is clear.   Eyes:      General: No scleral icterus.     Extraocular Movements: Extraocular movements intact.      Conjunctiva/sclera: Conjunctivae normal.   Neck:      Vascular: No JVD.   Cardiovascular:      Rate and Rhythm: Normal rate and regular rhythm.      Heart sounds: Normal heart sounds. No murmur heard.    No friction rub. No gallop.   Pulmonary:      Effort: Pulmonary effort is normal. No respiratory distress.      Breath sounds: Normal breath sounds. No wheezing.   Abdominal:      General: Bowel sounds are normal. There is no distension.      Palpations: Abdomen is soft.      Tenderness: There is no abdominal tenderness.   Musculoskeletal:      Cervical back: Normal range of motion.      Right lower leg: No edema.      Left lower leg: No edema.   Skin:     General: Skin is warm and dry.      Capillary Refill: Capillary refill takes less than 2 seconds.      Findings: No rash.   Neurological:      General: No focal deficit present.      Mental Status: He is alert and oriented to person, place, and time.   Psychiatric:         Mood and Affect: Mood normal.         Behavior: Behavior normal.       Assessment:       1. Pulmonary hypertension    2. Acute heart failure with preserved ejection fraction    3. Hypertensive heart and renal disease with congestive heart failure    4. Severe obesity with body mass index (BMI) of 35.0 to 39.9 with comorbidity    5. Persistent proteinuria  "   6. Anemia due to stage 5 chronic kidney disease, not on chronic dialysis    7. Stage 5 chronic kidney disease not on chronic dialysis    8. CKD (chronic kidney disease) stage 5, GFR less than 15 ml/min        Plan:   Return to clinic in 1 months    CKD stage G5A3 in a setting of arterionephrosclerosis (biopsy proven 10/17/2022) Baseline creatinine is 7 mg/dL with severely increased proteinuria UPCR 1.7  Lab Results   Component Value Date    CREATININE 7.1 (H) 11/29/2022      - Hypervolemic   - Complete avoidance of NSAIDs   - Low salt diet with < 2000 mg/day   - Dose adjust medications to GFR of < 15   - Avoid nephrotoxins including IV contrast   - Kidney smart class    HTN   - BP moderately controlled: continue current medications, avoid hypotension and dehydration    SHPT  Lab Results   Component Value Date    .7 (H) 10/10/2022    CALCIUM 8.7 11/29/2022    CAION 1.10 10/18/2022    PHOS 5.5 (H) 11/29/2022    - Calcitriol   - Sevelamer    Anemia   - Needs EPO    Obesity    - Diet and exercise    Med changes: none    I have personally review notes from referring physician, laboratory findings and appropriate images.  I have spent more then 60 min on this encounter with 50% of my time spent on face to face patient interaction.

## 2022-12-06 ENCOUNTER — TELEPHONE (OUTPATIENT)
Dept: FAMILY MEDICINE | Facility: CLINIC | Age: 42
End: 2022-12-06
Payer: COMMERCIAL

## 2022-12-06 LAB
ALLERGEN NAME: NORMAL
ALLERGEN RESULT: NORMAL
MISCELLANEOUS TEST NAME: NORMAL
REFERENCE LAB: NORMAL
SPECIMEN TYPE: NORMAL
TEST RESULT: NORMAL

## 2022-12-06 NOTE — TELEPHONE ENCOUNTER
----- Message from Trupti Carr sent at 12/6/2022  8:27 AM CST -----  Regarding: pt call back request  Name of Who is Calling:KENTON ELENA [[id number]]          What is the request in detail: pt called concerning his fmla paperwork and also his disability paper work please advise           Can the clinic reply by [facility]:no           What Number to Call Back if not in [facility]:[phone] (home)

## 2022-12-07 ENCOUNTER — CLINICAL SUPPORT (OUTPATIENT)
Dept: FAMILY MEDICINE | Facility: CLINIC | Age: 42
End: 2022-12-07
Payer: COMMERCIAL

## 2022-12-07 ENCOUNTER — TELEPHONE (OUTPATIENT)
Dept: FAMILY MEDICINE | Facility: CLINIC | Age: 42
End: 2022-12-07

## 2022-12-07 ENCOUNTER — TELEPHONE (OUTPATIENT)
Dept: FAMILY MEDICINE | Facility: CLINIC | Age: 42
End: 2022-12-07
Payer: COMMERCIAL

## 2022-12-07 VITALS — HEART RATE: 54 BPM | SYSTOLIC BLOOD PRESSURE: 148 MMHG | DIASTOLIC BLOOD PRESSURE: 90 MMHG

## 2022-12-07 DIAGNOSIS — I10 HYPERTENSION, UNSPECIFIED TYPE: ICD-10-CM

## 2022-12-07 DIAGNOSIS — Z01.30 BP CHECK: Primary | ICD-10-CM

## 2022-12-07 DIAGNOSIS — N18.5 CKD (CHRONIC KIDNEY DISEASE) STAGE 5, GFR LESS THAN 15 ML/MIN: ICD-10-CM

## 2022-12-07 DIAGNOSIS — Z91.89 AT RISK FOR OBSTRUCTIVE SLEEP APNEA: Primary | ICD-10-CM

## 2022-12-07 PROCEDURE — 99999 PR PBB SHADOW E&M-EST. PATIENT-LVL II: ICD-10-PCS | Mod: PBBFAC,,,

## 2022-12-07 PROCEDURE — 99999 PR PBB SHADOW E&M-EST. PATIENT-LVL II: CPT | Mod: PBBFAC,,,

## 2022-12-07 NOTE — PROGRESS NOTES
João Ham 42 y.o. male is here today for Blood Pressure check.   History of HTN yes.    Review of patient's allergies indicates:  No Known Allergies  Creatinine   Date Value Ref Range Status   11/29/2022 7.1 (H) 0.5 - 1.4 mg/dL Final     Sodium   Date Value Ref Range Status   11/29/2022 142 136 - 145 mmol/L Final     Potassium   Date Value Ref Range Status   11/29/2022 4.2 3.5 - 5.1 mmol/L Final   ]  Patient verifies taking blood pressure medications on a regular basis at the same time of the day.     Current Outpatient Medications:     calcitRIOL (ROCALTROL) 0.25 MCG Cap, Take 1 capsule (0.25 mcg total) by mouth once daily., Disp: 30 capsule, Rfl: 0    calcium carbonate (TUMS) 200 mg calcium (500 mg) chewable tablet, Take 2 tablets (1,000 mg total) by mouth 3 (three) times daily with meals., Disp: 180 tablet, Rfl: 11    cholecalciferol, vitamin D3, 125 mcg (5,000 unit) Tab, Take 1 tablet (5,000 Units total) by mouth once daily., Disp: 30 tablet, Rfl: 0    cloNIDine (CATAPRES) 0.3 MG tablet, Take 1 tablet (0.3 mg total) by mouth 3 (three) times daily., Disp: 270 tablet, Rfl: 1    furosemide (LASIX) 80 MG tablet, Take 1 tablet (80 mg total) by mouth 3 (three) times daily with meals., Disp: 90 tablet, Rfl: 11    hydrALAZINE (APRESOLINE) 100 MG tablet, Take 1 tablet (100 mg total) by mouth 3 (three) times daily., Disp: 90 tablet, Rfl: 11    isosorbide mononitrate (IMDUR) 120 MG 24 hr tablet, Take 1 tablet (120 mg total) by mouth once daily., Disp: 30 tablet, Rfl: 11    metoprolol succinate (TOPROL-XL) 50 MG 24 hr tablet, Take 3 tablets (150 mg total) by mouth once daily., Disp: 90 tablet, Rfl: 11    sevelamer carbonate (RENVELA) 800 mg Tab, Take 2 tablets (1,600 mg total) by mouth 3 (three) times daily with meals., Disp: 180 tablet, Rfl: 11    Does patient have record of home blood pressure readings no  Readings have been averaging 150-155/80-90.   Last dose of blood pressure medication was taken: 12:00 noon took  (clonidine,  hydralazine, lasix) 7:00 a.m. took (TOPROL XL and iIMDUR)  Patient is asymptomatic.   Complains of being extremely thirsty at night, to the point that he wakes up and drinks a lot of water. States that he is probably going over the fluid restrictions   because he is so thirsty, and asked if any of his medications could  cause him to have dry mouth. And if there is anything he can do about it?    Initial BP today was 158/90  Blood pressure reading after 15 minutes was, 148/90 Pulse: 54 .  3 mo fu Dr. Granado on 3/1/23  Dr. Granado notified.

## 2022-12-07 NOTE — TELEPHONE ENCOUNTER
João Ham 42 y.o. male is here today for Blood Pressure check.   History of HTN yes.    Review of patient's allergies indicates:  No Known Allergies  Creatinine   Date Value Ref Range Status   11/29/2022 7.1 (H) 0.5 - 1.4 mg/dL Final     Sodium   Date Value Ref Range Status   11/29/2022 142 136 - 145 mmol/L Final     Potassium   Date Value Ref Range Status   11/29/2022 4.2 3.5 - 5.1 mmol/L Final   ]  Patient verifies taking blood pressure medications on a regular basis at the same time of the day.     Current Outpatient Medications:     calcitRIOL (ROCALTROL) 0.25 MCG Cap, Take 1 capsule (0.25 mcg total) by mouth once daily., Disp: 30 capsule, Rfl: 0    calcium carbonate (TUMS) 200 mg calcium (500 mg) chewable tablet, Take 2 tablets (1,000 mg total) by mouth 3 (three) times daily with meals., Disp: 180 tablet, Rfl: 11    cholecalciferol, vitamin D3, 125 mcg (5,000 unit) Tab, Take 1 tablet (5,000 Units total) by mouth once daily., Disp: 30 tablet, Rfl: 0    cloNIDine (CATAPRES) 0.3 MG tablet, Take 1 tablet (0.3 mg total) by mouth 3 (three) times daily., Disp: 270 tablet, Rfl: 1    furosemide (LASIX) 80 MG tablet, Take 1 tablet (80 mg total) by mouth 3 (three) times daily with meals., Disp: 90 tablet, Rfl: 11    hydrALAZINE (APRESOLINE) 100 MG tablet, Take 1 tablet (100 mg total) by mouth 3 (three) times daily., Disp: 90 tablet, Rfl: 11    isosorbide mononitrate (IMDUR) 120 MG 24 hr tablet, Take 1 tablet (120 mg total) by mouth once daily., Disp: 30 tablet, Rfl: 11    metoprolol succinate (TOPROL-XL) 50 MG 24 hr tablet, Take 3 tablets (150 mg total) by mouth once daily., Disp: 90 tablet, Rfl: 11    sevelamer carbonate (RENVELA) 800 mg Tab, Take 2 tablets (1,600 mg total) by mouth 3 (three) times daily with meals., Disp: 180 tablet, Rfl: 11    Does patient have record of home blood pressure readings no  Readings have been averaging 150-155/80-90.   Last dose of blood pressure medication was taken: 12:00 noon took  (clonidine,  hydralazine, lasix) 7:00 a.m. took (TOPROL XL and iIMDUR)  Patient is asymptomatic.   Complains of being extremely thirsty at night, to the point that he wakes up and drinks a lot of water. States that he is probably going over the fluid restrictions   because he is so thirsty, and asked if any of his medications could  cause him to have dry mouth. And if there is anything he can do about it?    Initial BP today was 158/90  Blood pressure reading after 15 minutes was, 148/90 Pulse: 54 .  3 mo fu Dr. Granado on 3/1/23  Dr. Granado notified

## 2022-12-08 ENCOUNTER — TELEPHONE (OUTPATIENT)
Dept: PULMONOLOGY | Facility: CLINIC | Age: 42
End: 2022-12-08
Payer: COMMERCIAL

## 2022-12-08 ENCOUNTER — PATIENT MESSAGE (OUTPATIENT)
Dept: FAMILY MEDICINE | Facility: CLINIC | Age: 42
End: 2022-12-08
Payer: COMMERCIAL

## 2022-12-08 RX ORDER — VALSARTAN 40 MG/1
40 TABLET ORAL DAILY
Qty: 30 TABLET | Refills: 2 | Status: SHIPPED | OUTPATIENT
Start: 2022-12-08 | End: 2022-12-14

## 2022-12-08 NOTE — TELEPHONE ENCOUNTER
Thanks for the update.  Is dry mouth is caused from the clonidine and we are limited on the medications we can use for his blood pressure due to his chronic kidney disease.  We might be able to start some other medications and try a lower dose of the clonidine.  I sent in a very low-dose of valsartan to his pharmacy and he can start taking this will need to recheck his blood work with a BMP to check his potassium to make sure does not raise.  In the meantime for dry mouth I recommend [employer] XyliMelts Dry Mouth Relief Moisturizing Oral Adhering Discs Mild Mint with Xylitol, For Dry Mouth which can be found on amaz[employer] and some stores. Link https://www.[employer].com/XyliMelts-Moisturizing-Adhering-Stimulates-Non-Acidic/dp/I43SEXW2HS    I agree with getting to sleep studies at high risk for sleep apnea and sleep apnea can increase blood pressure.  Placed referral let my staff know to set this up.    Please have a visit with me urinate BP within the next 3 weeks to go over blood pressure thanks

## 2022-12-13 ENCOUNTER — LAB VISIT (OUTPATIENT)
Dept: LAB | Facility: HOSPITAL | Age: 42
End: 2022-12-13
Attending: STUDENT IN AN ORGANIZED HEALTH CARE EDUCATION/TRAINING PROGRAM
Payer: COMMERCIAL

## 2022-12-13 ENCOUNTER — OFFICE VISIT (OUTPATIENT)
Dept: PULMONOLOGY | Facility: CLINIC | Age: 42
End: 2022-12-13
Payer: COMMERCIAL

## 2022-12-13 ENCOUNTER — PATIENT MESSAGE (OUTPATIENT)
Dept: FAMILY MEDICINE | Facility: CLINIC | Age: 42
End: 2022-12-13
Payer: COMMERCIAL

## 2022-12-13 ENCOUNTER — TELEPHONE (OUTPATIENT)
Dept: TRANSPLANT | Facility: CLINIC | Age: 42
End: 2022-12-13
Payer: COMMERCIAL

## 2022-12-13 VITALS
HEART RATE: 57 BPM | SYSTOLIC BLOOD PRESSURE: 177 MMHG | OXYGEN SATURATION: 99 % | HEIGHT: 74 IN | DIASTOLIC BLOOD PRESSURE: 102 MMHG | BODY MASS INDEX: 35.48 KG/M2 | WEIGHT: 276.44 LBS

## 2022-12-13 DIAGNOSIS — I10 HYPERTENSION, UNSPECIFIED TYPE: ICD-10-CM

## 2022-12-13 DIAGNOSIS — I27.20 PULMONARY HYPERTENSION: ICD-10-CM

## 2022-12-13 DIAGNOSIS — I13.0 HYPERTENSIVE HEART AND RENAL DISEASE WITH CONGESTIVE HEART FAILURE: ICD-10-CM

## 2022-12-13 DIAGNOSIS — I10 HYPERTENSION, UNSPECIFIED TYPE: Primary | ICD-10-CM

## 2022-12-13 DIAGNOSIS — N18.5 CKD (CHRONIC KIDNEY DISEASE) STAGE 5, GFR LESS THAN 15 ML/MIN: ICD-10-CM

## 2022-12-13 DIAGNOSIS — E66.01 SEVERE OBESITY WITH BODY MASS INDEX (BMI) OF 35.0 TO 39.9 WITH COMORBIDITY: ICD-10-CM

## 2022-12-13 DIAGNOSIS — Z91.89 AT RISK FOR OBSTRUCTIVE SLEEP APNEA: Primary | ICD-10-CM

## 2022-12-13 DIAGNOSIS — N18.5 STAGE 5 CHRONIC KIDNEY DISEASE NOT ON CHRONIC DIALYSIS: ICD-10-CM

## 2022-12-13 PROCEDURE — 3008F BODY MASS INDEX DOCD: CPT | Mod: CPTII,S$GLB,, | Performed by: NURSE PRACTITIONER

## 2022-12-13 PROCEDURE — 3066F NEPHROPATHY DOC TX: CPT | Mod: CPTII,S$GLB,, | Performed by: NURSE PRACTITIONER

## 2022-12-13 PROCEDURE — 36415 COLL VENOUS BLD VENIPUNCTURE: CPT | Mod: PO | Performed by: STUDENT IN AN ORGANIZED HEALTH CARE EDUCATION/TRAINING PROGRAM

## 2022-12-13 PROCEDURE — 1159F PR MEDICATION LIST DOCUMENTED IN MEDICAL RECORD: ICD-10-PCS | Mod: CPTII,S$GLB,, | Performed by: NURSE PRACTITIONER

## 2022-12-13 PROCEDURE — 4010F ACE/ARB THERAPY RXD/TAKEN: CPT | Mod: CPTII,S$GLB,, | Performed by: NURSE PRACTITIONER

## 2022-12-13 PROCEDURE — 99204 PR OFFICE/OUTPT VISIT, NEW, LEVL IV, 45-59 MIN: ICD-10-PCS | Mod: S$GLB,,, | Performed by: NURSE PRACTITIONER

## 2022-12-13 PROCEDURE — 4010F PR ACE/ARB THEARPY RXD/TAKEN: ICD-10-PCS | Mod: CPTII,S$GLB,, | Performed by: NURSE PRACTITIONER

## 2022-12-13 PROCEDURE — 99999 PR PBB SHADOW E&M-EST. PATIENT-LVL IV: CPT | Mod: PBBFAC,,, | Performed by: NURSE PRACTITIONER

## 2022-12-13 PROCEDURE — 80048 BASIC METABOLIC PNL TOTAL CA: CPT | Performed by: STUDENT IN AN ORGANIZED HEALTH CARE EDUCATION/TRAINING PROGRAM

## 2022-12-13 PROCEDURE — 3080F DIAST BP >= 90 MM HG: CPT | Mod: CPTII,S$GLB,, | Performed by: NURSE PRACTITIONER

## 2022-12-13 PROCEDURE — 3077F SYST BP >= 140 MM HG: CPT | Mod: CPTII,S$GLB,, | Performed by: NURSE PRACTITIONER

## 2022-12-13 PROCEDURE — 3077F PR MOST RECENT SYSTOLIC BLOOD PRESSURE >= 140 MM HG: ICD-10-PCS | Mod: CPTII,S$GLB,, | Performed by: NURSE PRACTITIONER

## 2022-12-13 PROCEDURE — 3066F PR DOCUMENTATION OF TREATMENT FOR NEPHROPATHY: ICD-10-PCS | Mod: CPTII,S$GLB,, | Performed by: NURSE PRACTITIONER

## 2022-12-13 PROCEDURE — 1159F MED LIST DOCD IN RCRD: CPT | Mod: CPTII,S$GLB,, | Performed by: NURSE PRACTITIONER

## 2022-12-13 PROCEDURE — 3080F PR MOST RECENT DIASTOLIC BLOOD PRESSURE >= 90 MM HG: ICD-10-PCS | Mod: CPTII,S$GLB,, | Performed by: NURSE PRACTITIONER

## 2022-12-13 PROCEDURE — 3044F PR MOST RECENT HEMOGLOBIN A1C LEVEL <7.0%: ICD-10-PCS | Mod: CPTII,S$GLB,, | Performed by: NURSE PRACTITIONER

## 2022-12-13 PROCEDURE — 3008F PR BODY MASS INDEX (BMI) DOCUMENTED: ICD-10-PCS | Mod: CPTII,S$GLB,, | Performed by: NURSE PRACTITIONER

## 2022-12-13 PROCEDURE — 99204 OFFICE O/P NEW MOD 45 MIN: CPT | Mod: S$GLB,,, | Performed by: NURSE PRACTITIONER

## 2022-12-13 PROCEDURE — 99999 PR PBB SHADOW E&M-EST. PATIENT-LVL IV: ICD-10-PCS | Mod: PBBFAC,,, | Performed by: NURSE PRACTITIONER

## 2022-12-13 PROCEDURE — 3044F HG A1C LEVEL LT 7.0%: CPT | Mod: CPTII,S$GLB,, | Performed by: NURSE PRACTITIONER

## 2022-12-13 NOTE — PROGRESS NOTES
12/13/2022    João Ham  New Patient Consult    Chief Complaint   Patient presents with    Apnea     Pt states that he only gets 2/4 hours of sleep, snores, stops breathing, dry mouth.        HPI:  12/13/2022:  Denies history of lung disease, inhaler use, supplemental oxygen use, or CPAP use. Denies personal history of cancer, PE, anticoagulation use.   Recent hospitalization at [facility] on 10/10/22 - patient diagnosed with acute renal failure, CHF, HTN Emergency, and Pulmonary Edema. Patient underwent cardiac and nephrology consults while inpatient. Underwent renal biopsy. Was subsequently dc home on 10/18. Patient currently following with nephrology, Dr. Hanna - is not currently on dialysis however anticipates may need in the future, possible work up for kidney transplant.   Endorses daytime fatigue, nocturnal arousals. Denies morning headaches, depression.  Denies wheezing, chest tightness. Denies cough, mucous production.   Does endorse shortness of breath prior to hospitalization however since discharged home has improved.   Diagnosed with COVID and subsequent pneumonia in Jan 2022 - did not require hospitalization at that time.         Social Hx: Lives with nephew - no animals in the home. Work at [employer]; however has been out of work since October. No Asbestosis exposure, Smoking Hx: Never smoker  Family Hx: No Lung Cancer, No COPD, No Asthma  Medical Hx: Previous pneumonia ; No previous shoulder/chest surgery        The chief compliant problem is new to me.   PFSH:  Past Medical History:   Diagnosis Date    Hypertension          No past surgical history on file.  Social History     Tobacco Use    Smoking status: Never     Passive exposure: Never    Smokeless tobacco: Never     No family history on file.  Review of patient's allergies indicates:   Allergen Reactions    Mushroom Swelling     Tongue swells      I have reviewed past medical, family, and social history. I have reviewed  "previous nurse notes.    Performance Status:The patient's activity level is functions out of house.      Review of Systems   Constitutional:  Positive for fatigue. Negative for activity change, appetite change, chills, diaphoresis, fever and unexpected weight change.   HENT:  Negative for congestion, postnasal drip, rhinorrhea, sinus pressure, sinus pain, sore throat and trouble swallowing.    Eyes:  Negative for photophobia and visual disturbance.   Respiratory:  Negative for cough, choking, chest tightness, shortness of breath and wheezing.    Cardiovascular:  Positive for leg swelling. Negative for chest pain and palpitations.   Gastrointestinal:  Negative for abdominal distention, abdominal pain, blood in stool, constipation, diarrhea, nausea and vomiting.   Genitourinary:  Positive for decreased urine volume. Negative for difficulty urinating, dysuria, flank pain and hematuria.   Musculoskeletal:  Positive for back pain. Negative for gait problem and joint swelling.   Skin:  Negative for rash and wound.   Allergic/Immunologic: Positive for food allergies. Negative for environmental allergies.   Neurological:  Negative for dizziness, seizures, syncope, weakness, light-headedness, numbness and headaches.   Hematological:  Does not bruise/bleed easily.   Psychiatric/Behavioral:  Positive for sleep disturbance. Negative for confusion. The patient is not nervous/anxious.        Exam:Comprehensive exam done. BP (!) 177/102 (BP Location: Left arm, Patient Position: Sitting, BP Method: Medium (Automatic)) Comment: pt states he just took his medication  Pulse (!) 57   Ht 6' 2" (1.88 m)   Wt 125.4 kg (276 lb 7.3 oz)   SpO2 99% Comment: room air at rest  BMI 35.49 kg/m²   Exam included Vitals as listed  Constitutional: He is oriented to person, place, and time. He appears well-developed. No distress.   Nose: Nose normal.   Mouth/Throat: Uvula is midline, oropharynx is clear and moist and mucous membranes are normal. " No dental caries. No oropharyngeal exudate, posterior oropharyngeal edema, posterior oropharyngeal erythema or tonsillar abscesses.  Eyes: Pupils are equal, round, and reactive to light.   Neck: No JVD present. No thyromegaly present.   Cardiovascular: Normal rate, regular rhythm and normal heart sounds. Exam reveals no gallop and no friction rub.   Murmur heard.  Pulmonary/Chest: Effort normal and breath sounds normal. No accessory muscle usage or stridor. No apnea and no tachypnea. No respiratory distress, decreased breath sounds, wheezes, rhonchi, rales, or tenderness. Clear breath sounds throughout, on room air, in no acute distress.  Abdominal: Soft. He exhibits no mass. There is no tenderness. No hepatosplenomegaly, hernias and normoactive bowel sounds  Musculoskeletal: Normal range of motion. exhibits no edema.   Neurological:  alert and oriented to person, place, and time. not disoriented.   Skin: Skin is warm and dry. Capillary refill takes less 2 sec. No cyanosis or erythema. No pallor. Nails show no clubbing.   Psychiatric: normal mood and affect. behavior is normal. Judgment and thought content normal.       Radiographs (ct chest and cxr) reviewed: view by direct vision     X-Ray Chest 1 View 10/10/2022 FINDINGS:  The heart is mildly enlarged.  The lungs are well expanded without consolidation or pleural effusion.   Impression:   Cardiomegaly.      Labs reviewed    Lab Results   Component Value Date    WBC 5.20 10/24/2022    RBC 3.36 (L) 10/24/2022    HGB 9.5 (L) 10/24/2022    HCT 29.7 (L) 10/24/2022    MCV 88 10/24/2022    MCH 28.3 10/24/2022    MCHC 32.0 10/24/2022    RDW 15.3 (H) 10/24/2022     10/24/2022    MPV 10.9 10/24/2022    GRAN 3.1 10/24/2022    GRAN 58.7 10/24/2022    LYMPH 1.3 10/24/2022    LYMPH 24.2 10/24/2022    MONO 0.7 10/24/2022    MONO 14.0 10/24/2022    EOS 0.1 10/24/2022    BASO 0.02 10/24/2022    EOSINOPHIL 2.5 10/24/2022    BASOPHIL 0.4 10/24/2022       PFT was not  done  Pulmonary Functions Testing Results:      Transthoracic echo (TTE) complete Summary 10/11/2022  The estimated PA systolic pressure is 48 mmHg.  The left ventricle is normal in size with mild concentric hypertrophy and normal systolic function.  Grade III left ventricular diastolic dysfunction.  Moderate right ventricular enlargement with mildly to moderately reduced right ventricular systolic function.  Severe left atrial enlargement.  There is moderate pulmonary hypertension.  Intermediate central venous pressure (8 mmHg).  Mild-to-moderate mitral regurgitation.  Moderate to severe tricuspid regurgitation.  Moderate right atrial enlargement.  The estimated ejection fraction is 60%.  Atrial fibrillation not observed.        Plan:  Clinical impression is resonably certain and repeated evaluation prn +/- follow up will be needed as below.    João was seen today for apnea.    Diagnoses and all orders for this visit:    At risk for obstructive sleep apnea  -     Ambulatory referral/consult to Pulmonology    Pulmonary hypertension    Hypertensive heart and renal disease with congestive heart failure    Stage 5 chronic kidney disease not on chronic dialysis    Severe obesity with body mass index (BMI) of 35.0 to 39.9 with comorbidity    Body mass index is 35.49 kg/m². Morbid obesity complicates all aspects of disease management from diagnostic modalities to treatment. Weight loss encouraged and health benefits explained to patient. Nutritional counseling and physical activity encouraged.       Follow up in about 3 months (around 3/13/2023), or if symptoms worsen or fail to improve.    Discussed with patient above for education the following:      Patient Instructions   I have ordered an at home sleep study to evaluate for sleep apnea. If test is positive, I will order a CPAP machine. Please notify my clinic when you receive the machine to set up a 31 day compliance appointment. You must use the machine for a least  4 hours every night to avoid insurance denial.     Continue current medication regiment. Keep follow up appointment as scheduled. Please call the office if you have any questions or concerns.

## 2022-12-14 ENCOUNTER — PATIENT MESSAGE (OUTPATIENT)
Dept: FAMILY MEDICINE | Facility: CLINIC | Age: 42
End: 2022-12-14
Payer: COMMERCIAL

## 2022-12-14 LAB
ANION GAP SERPL CALC-SCNC: 10 MMOL/L (ref 8–16)
BUN SERPL-MCNC: 56 MG/DL (ref 6–20)
CALCIUM SERPL-MCNC: 10.1 MG/DL (ref 8.7–10.5)
CHLORIDE SERPL-SCNC: 99 MMOL/L (ref 95–110)
CO2 SERPL-SCNC: 28 MMOL/L (ref 23–29)
CREAT SERPL-MCNC: 7.4 MG/DL (ref 0.5–1.4)
EST. GFR  (NO RACE VARIABLE): 8.8 ML/MIN/1.73 M^2
GLUCOSE SERPL-MCNC: 77 MG/DL (ref 70–110)
POTASSIUM SERPL-SCNC: 4.7 MMOL/L (ref 3.5–5.1)
SODIUM SERPL-SCNC: 137 MMOL/L (ref 136–145)

## 2022-12-14 RX ORDER — VALSARTAN 80 MG/1
80 TABLET ORAL DAILY
Qty: 30 TABLET | Refills: 3 | Status: SHIPPED | OUTPATIENT
Start: 2022-12-14 | End: 2023-01-17

## 2022-12-14 NOTE — ADDENDUM NOTE
Addended by: JAVED SWENSON on: 12/14/2022 06:47 AM     Modules accepted: Orders     High Dose Vitamin A Counseling: Side effects reviewed, pt to contact office should one occur.

## 2022-12-18 PROCEDURE — G0179 MD RECERTIFICATION HHA PT: HCPCS | Mod: NTX,,, | Performed by: PHYSICIAN ASSISTANT

## 2022-12-18 PROCEDURE — G0179 PR HOME HEALTH MD RECERTIFICATION: ICD-10-PCS | Mod: NTX,,, | Performed by: PHYSICIAN ASSISTANT

## 2022-12-20 ENCOUNTER — TELEPHONE (OUTPATIENT)
Dept: TRANSPLANT | Facility: CLINIC | Age: 42
End: 2022-12-20
Payer: COMMERCIAL

## 2022-12-29 ENCOUNTER — LAB VISIT (OUTPATIENT)
Dept: LAB | Facility: HOSPITAL | Age: 42
End: 2022-12-29
Attending: STUDENT IN AN ORGANIZED HEALTH CARE EDUCATION/TRAINING PROGRAM
Payer: COMMERCIAL

## 2022-12-29 ENCOUNTER — OFFICE VISIT (OUTPATIENT)
Dept: FAMILY MEDICINE | Facility: CLINIC | Age: 42
End: 2022-12-29
Payer: COMMERCIAL

## 2022-12-29 VITALS
RESPIRATION RATE: 16 BRPM | HEART RATE: 57 BPM | BODY MASS INDEX: 35.49 KG/M2 | OXYGEN SATURATION: 98 % | DIASTOLIC BLOOD PRESSURE: 82 MMHG | SYSTOLIC BLOOD PRESSURE: 136 MMHG | HEIGHT: 74 IN | TEMPERATURE: 98 F

## 2022-12-29 DIAGNOSIS — Z00.00 HEALTHCARE MAINTENANCE: Primary | ICD-10-CM

## 2022-12-29 DIAGNOSIS — I10 HYPERTENSION, UNSPECIFIED TYPE: ICD-10-CM

## 2022-12-29 DIAGNOSIS — I13.0 HYPERTENSIVE HEART AND RENAL DISEASE WITH CONGESTIVE HEART FAILURE: ICD-10-CM

## 2022-12-29 DIAGNOSIS — E66.01 SEVERE OBESITY WITH BODY MASS INDEX (BMI) OF 35.0 TO 39.9 WITH COMORBIDITY: ICD-10-CM

## 2022-12-29 DIAGNOSIS — N18.5 STAGE 5 CHRONIC KIDNEY DISEASE NOT ON CHRONIC DIALYSIS: ICD-10-CM

## 2022-12-29 DIAGNOSIS — D63.1 ANEMIA DUE TO STAGE 5 CHRONIC KIDNEY DISEASE, NOT ON CHRONIC DIALYSIS: ICD-10-CM

## 2022-12-29 DIAGNOSIS — N18.5 ANEMIA DUE TO STAGE 5 CHRONIC KIDNEY DISEASE, NOT ON CHRONIC DIALYSIS: ICD-10-CM

## 2022-12-29 DIAGNOSIS — I10 PRIMARY HYPERTENSION: ICD-10-CM

## 2022-12-29 LAB
ANION GAP SERPL CALC-SCNC: 10 MMOL/L (ref 8–16)
BUN SERPL-MCNC: 79 MG/DL (ref 6–20)
CALCIUM SERPL-MCNC: 9.3 MG/DL (ref 8.7–10.5)
CHLORIDE SERPL-SCNC: 101 MMOL/L (ref 95–110)
CO2 SERPL-SCNC: 27 MMOL/L (ref 23–29)
CREAT SERPL-MCNC: 7.8 MG/DL (ref 0.5–1.4)
EST. GFR  (NO RACE VARIABLE): 8.2 ML/MIN/1.73 M^2
GLUCOSE SERPL-MCNC: 92 MG/DL (ref 70–110)
POTASSIUM SERPL-SCNC: 4.2 MMOL/L (ref 3.5–5.1)
SODIUM SERPL-SCNC: 138 MMOL/L (ref 136–145)

## 2022-12-29 PROCEDURE — 3008F PR BODY MASS INDEX (BMI) DOCUMENTED: ICD-10-PCS | Mod: CPTII,S$GLB,, | Performed by: STUDENT IN AN ORGANIZED HEALTH CARE EDUCATION/TRAINING PROGRAM

## 2022-12-29 PROCEDURE — 1159F MED LIST DOCD IN RCRD: CPT | Mod: CPTII,S$GLB,, | Performed by: STUDENT IN AN ORGANIZED HEALTH CARE EDUCATION/TRAINING PROGRAM

## 2022-12-29 PROCEDURE — 3008F BODY MASS INDEX DOCD: CPT | Mod: CPTII,S$GLB,, | Performed by: STUDENT IN AN ORGANIZED HEALTH CARE EDUCATION/TRAINING PROGRAM

## 2022-12-29 PROCEDURE — 4010F ACE/ARB THERAPY RXD/TAKEN: CPT | Mod: CPTII,S$GLB,, | Performed by: STUDENT IN AN ORGANIZED HEALTH CARE EDUCATION/TRAINING PROGRAM

## 2022-12-29 PROCEDURE — 3044F HG A1C LEVEL LT 7.0%: CPT | Mod: CPTII,S$GLB,, | Performed by: STUDENT IN AN ORGANIZED HEALTH CARE EDUCATION/TRAINING PROGRAM

## 2022-12-29 PROCEDURE — 80048 BASIC METABOLIC PNL TOTAL CA: CPT | Mod: TXP | Performed by: STUDENT IN AN ORGANIZED HEALTH CARE EDUCATION/TRAINING PROGRAM

## 2022-12-29 PROCEDURE — 99999 PR PBB SHADOW E&M-EST. PATIENT-LVL IV: CPT | Mod: PBBFAC,,, | Performed by: STUDENT IN AN ORGANIZED HEALTH CARE EDUCATION/TRAINING PROGRAM

## 2022-12-29 PROCEDURE — 4010F PR ACE/ARB THEARPY RXD/TAKEN: ICD-10-PCS | Mod: CPTII,S$GLB,, | Performed by: STUDENT IN AN ORGANIZED HEALTH CARE EDUCATION/TRAINING PROGRAM

## 2022-12-29 PROCEDURE — 99214 PR OFFICE/OUTPT VISIT, EST, LEVL IV, 30-39 MIN: ICD-10-PCS | Mod: S$GLB,,, | Performed by: STUDENT IN AN ORGANIZED HEALTH CARE EDUCATION/TRAINING PROGRAM

## 2022-12-29 PROCEDURE — 3066F PR DOCUMENTATION OF TREATMENT FOR NEPHROPATHY: ICD-10-PCS | Mod: CPTII,S$GLB,, | Performed by: STUDENT IN AN ORGANIZED HEALTH CARE EDUCATION/TRAINING PROGRAM

## 2022-12-29 PROCEDURE — 36415 COLL VENOUS BLD VENIPUNCTURE: CPT | Mod: PO,NTX | Performed by: STUDENT IN AN ORGANIZED HEALTH CARE EDUCATION/TRAINING PROGRAM

## 2022-12-29 PROCEDURE — 3079F DIAST BP 80-89 MM HG: CPT | Mod: CPTII,S$GLB,, | Performed by: STUDENT IN AN ORGANIZED HEALTH CARE EDUCATION/TRAINING PROGRAM

## 2022-12-29 PROCEDURE — 1159F PR MEDICATION LIST DOCUMENTED IN MEDICAL RECORD: ICD-10-PCS | Mod: CPTII,S$GLB,, | Performed by: STUDENT IN AN ORGANIZED HEALTH CARE EDUCATION/TRAINING PROGRAM

## 2022-12-29 PROCEDURE — 99214 OFFICE O/P EST MOD 30 MIN: CPT | Mod: S$GLB,,, | Performed by: STUDENT IN AN ORGANIZED HEALTH CARE EDUCATION/TRAINING PROGRAM

## 2022-12-29 PROCEDURE — 3066F NEPHROPATHY DOC TX: CPT | Mod: CPTII,S$GLB,, | Performed by: STUDENT IN AN ORGANIZED HEALTH CARE EDUCATION/TRAINING PROGRAM

## 2022-12-29 PROCEDURE — 3075F PR MOST RECENT SYSTOLIC BLOOD PRESS GE 130-139MM HG: ICD-10-PCS | Mod: CPTII,S$GLB,, | Performed by: STUDENT IN AN ORGANIZED HEALTH CARE EDUCATION/TRAINING PROGRAM

## 2022-12-29 PROCEDURE — 99999 PR PBB SHADOW E&M-EST. PATIENT-LVL IV: ICD-10-PCS | Mod: PBBFAC,,, | Performed by: STUDENT IN AN ORGANIZED HEALTH CARE EDUCATION/TRAINING PROGRAM

## 2022-12-29 PROCEDURE — 3079F PR MOST RECENT DIASTOLIC BLOOD PRESSURE 80-89 MM HG: ICD-10-PCS | Mod: CPTII,S$GLB,, | Performed by: STUDENT IN AN ORGANIZED HEALTH CARE EDUCATION/TRAINING PROGRAM

## 2022-12-29 PROCEDURE — 3044F PR MOST RECENT HEMOGLOBIN A1C LEVEL <7.0%: ICD-10-PCS | Mod: CPTII,S$GLB,, | Performed by: STUDENT IN AN ORGANIZED HEALTH CARE EDUCATION/TRAINING PROGRAM

## 2022-12-29 PROCEDURE — 3075F SYST BP GE 130 - 139MM HG: CPT | Mod: CPTII,S$GLB,, | Performed by: STUDENT IN AN ORGANIZED HEALTH CARE EDUCATION/TRAINING PROGRAM

## 2022-12-29 NOTE — PROGRESS NOTES
[facility] Note    Subjective:    The HPI and pertinent ROS is included in the Diagnostic Impression Remarks section at the end of the note. Please see below for further details. Chief complaint is at end of note.     KENTON is a pleasant intelligent patient who is here for evaluation.     Modified Medications    No medications on file       Data reviewed 274}  Previous medical records reviewed and summarized in plan section at end of note.      If you are due for any health screening(s) below please notify me so we can arrange them to be ordered and scheduled. Most healthy patients at your age complete them, but you are free to accept or refuse. If you can't do it, I'll definitely understand. If you can, I'd certainly appreciate it!     Tests to Keep You Healthy    Last Blood Pressure <= 139/89 (12/29/2022): NO      The following portions of the patient's history were reviewed and updated as appropriate: allergies, current medications, past family history, past medical history, past social history, past surgical history and problem list.    He  has a past medical history of Hypertension.  He  has no past surgical history on file.    He  reports that he has never smoked. He has never been exposed to tobacco smoke. He has never used smokeless tobacco. He reports that he does not drink alcohol and does not use drugs.  He family history is not on file.    Review of patient's allergies indicates:   Allergen Reactions    Mushroom Swelling     Tongue swells        Tobacco Use: Low Risk     Smoking Tobacco Use: Never    Smokeless Tobacco Use: Never    Passive Exposure: Never     Physical Examination  General appearance: alert, cooperative, no distress  Neck: no thyromegaly, no neck stiffness  Lungs: clear to auscultation, no wheezes, rales or rhonchi, symmetric air entry  Heart: normal rate, regular rhythm, normal S1, S2, no murmurs, rubs, clicks or gallops  Abdomen: soft, nontender, nondistended, no  "rigidity, rebound, or guarding.   Back: no point tenderness over spine  Extremities: peripheral pulses normal, no unilateral leg swelling or calf tenderness   Neurological:alert, oriented, normal speech, no new focal findings or movement disorder noted from baseline    BP Readings from Last 3 Encounters:   12/29/22 136/82   12/13/22 (!) 177/102   12/07/22 (!) 148/90     Wt Readings from Last 3 Encounters:   12/13/22 125.4 kg (276 lb 7.3 oz)   12/05/22 130.9 kg (288 lb 9.3 oz)   11/30/22 130.9 kg (288 lb 9.3 oz)     /82 (BP Location: Right arm, Patient Position: Sitting, BP Method: Large (Manual))   Pulse (!) 57   Temp 98.3 °F (36.8 °C) (Oral)   Resp 16   Ht 6' 2" (1.88 m)   SpO2 98%   BMI 35.49 kg/m²    274}  Laboratory: I have reviewed old labs below:    274}    Lab Results   Component Value Date    WBC 5.20 10/24/2022    HGB 9.5 (L) 10/24/2022    HCT 29.7 (L) 10/24/2022    MCV 88 10/24/2022     10/24/2022     12/13/2022    K 4.7 12/13/2022    CL 99 12/13/2022    CALCIUM 10.1 12/13/2022    PHOS 5.5 (H) 11/29/2022    CO2 28 12/13/2022    GLU 77 12/13/2022    BUN 56 (H) 12/13/2022    CREATININE 7.4 (H) 12/13/2022    ANIONGAP 10 12/13/2022    PROT 6.3 10/18/2022    ALBUMIN 3.4 (L) 11/29/2022    BILITOT 0.3 10/18/2022    ALKPHOS 64 10/18/2022    ALT 25 10/18/2022    AST 11 10/18/2022    INR 1.1 10/10/2022    CHOL 141 10/11/2022    TRIG 66 10/11/2022    HDL 39 (L) 10/11/2022    LDLCALC 88.8 10/11/2022    TSH 1.699 10/10/2022    HGBA1C 5.5 10/10/2022     Lab reviewed by me: Particular labs of significance that I will monitor, workup, or treat to improve are mentioned below in diagnostic impression remarks.    The 10-year ASCVD risk score (Tyrone AREVALO, et al., 2019) is: 5.9%    Values used to calculate the score:      Age: 42 years      Sex: Male      Is Non- : Yes      Diabetic: No      Tobacco smoker: No      Systolic Blood Pressure: 136 mmHg      Is BP treated: Yes      " "HDL Cholesterol: 39 mg/dL      Total Cholesterol: 141 mg/dL      Imaging/EKG: I have reviewed the pertinent results and my findings are noted in remarks.  274}    CC:   Chief Complaint   Patient presents with    Follow-up    Hypertension        274}    Assessment/Plan  João Ham is a 42 y.o. male who presents to clinic with:  1. Healthcare maintenance    2. Hypertension, unspecified type    3. Hypertensive heart and renal disease with congestive heart failure    4. Stage 5 chronic kidney disease not on chronic dialysis    5. Severe obesity with body mass index (BMI) of 35.0 to 39.9 with comorbidity    6. Anemia due to stage 5 chronic kidney disease, not on chronic dialysis       274}  Diagnostic Impression Remarks + HPI     Documentation entered by me for this encounter may have been done in part using speech-recognition technology. Although I have made an effort to ensure accuracy, "sound like" errors may exist and should be interpreted in context.     Hypertension-stable improved after adding on valsartan and will continue current medications check his potassium level today is going to see Nephrology as well.  Low-salt diet monitor his electrolytes does have dry mouth and the clonidine but were limited on medications due to his kidney disease has tried some measures to increase saliva consider sleep study in future as well  Hypertensive heart disease-continue Lasix along with beta-blocker recommend follow-up with cardiology no shortness of breath  CKD-stable will work to improve blood pressure control better continue Renvela and recommended follow-up with Cardiology monitor blood work    Severe obesity with comorbidity-Hypertension to which the severe obesity is a contributing factor. This is consistent with the definition of severe obesity with comorbidity. The patient's severe obesity was monitored, evaluated, addressed and/or treated. Diet and exercise recommended see counseling section for further info. "     Anemia stable monitor blood work    This is the extent of this pleasant patient's concerns at this present time. He did not feel chest pain upon exertion, dyspnea, nausea, vomiting, diaphoresis, or syncope. No pleuritic chest pain, unilateral leg swelling, calf tenderness, or calf pain. Negative for unintentional weight loss night sweats and fevers. João will return to clinic in a few months for further workup and reassessment or sooner as needed. He was instructed to call the clinic or go to the emergency department or urgent care immediately if his symptoms do not improve, worsens, or if any new symptoms develop. As we discussed that symptoms could worsen over the next 24 hours he was advised that if any increased swelling, pain, or numbness arise to go immediately to the ED. Patient knows to call any time if an emergency arises. Shared decision making occurred and he verbalized understanding in agreement with this plan. I discussed imaging findings, diagnosis, possibilities, treatment options, medications, risks, and benefits. He had many questions regarding the options and long-term effects. All questions were answered. He expressed understanding after counseling regarding the diagnosis and recommendations. He was capable and demonstrated competence with understanding of these options. Shared decision making was performed resulting in him choosing the current treatment plan. Patient handout was given with instructions and recommendations. Advised the patient that if they become pregnant to alert us immediately to assess for medication changes. I also discussed the importance of close follow up to discuss labs, change or modify his medications if needed, monitor side effects, and further evaluation of medical problems.     Additional workup planned: see labs ordered below.    See below for labs and meds ordered with associated diagnosis      1. Healthcare maintenance    2. Hypertension, unspecified  type    3. Hypertensive heart and renal disease with congestive heart failure    4. Stage 5 chronic kidney disease not on chronic dialysis    5. Severe obesity with body mass index (BMI) of 35.0 to 39.9 with comorbidity    6. Anemia due to stage 5 chronic kidney disease, not on chronic dialysis      Dawson Granado MD   274}    If you are due for any health screening(s) below please notify me so we can arrange them to be ordered and scheduled. Most healthy patients at your age complete them, but you are free to accept or refuse.     If you can't do it, I'll definitely understand. If you can, I'd certainly appreciate it!   Tests to Keep You Healthy    Last Blood Pressure <= 139/89 (12/29/2022): NO

## 2022-12-29 NOTE — PATIENT INSTRUCTIONS
Tiago Moyer, Please read below to learn more on healthy habits.  Most of my patients read it.       If you are due for any health screening(s) below please notify me so we can arrange them to be ordered and scheduled. Most healthy patients at your age complete them, but you are free to accept or refuse.     If you can't do it, I'll definitely understand. If you can, I'd certainly appreciate it!     Tests to Keep You Healthy    Last Blood Pressure <= 139/89 (12/29/2022): NO                              Table of Contents:     Cancer prevention  Explanation on the different components of your blood work and interpretation  Frequently asked questions   Blood pressure log   E-Visits  E-Consults     Cancer Prevention    Why should you choose to get screened for cancer? One simple reason is because you are important. You matter and deserve to have the best health so you can fulfill your great potential.     Colon Cancer screening - Most colon cancers can be prevented by screening. In most cases a polyp in the colon can grow and is not cancerous at first, but it can become cancerous years later. A polyp is like a seed that can grow into a weed if it is left in the soil. If the seed is detected and removed, no weed sprouts. A FIT test/Cologuard test and colonoscopy can detect precancerous polyps and lead to the prevention of cancer. A polyp is just like a seed that can be removed before the roots take hold. A colonoscopy can remove these polyps and eliminate the chance that these polyps turn into cancer.     Cervical Cancer screening- A PAP smear can detect precancerous cells that can become cervical cancer and can lead to procedures to remove them. It is very important to get a PAP smear as investing these few minutes can help prevent a lot of trouble down the road. If you want to do the most you can do to spend more time with your family and friends', cancer screenings can help with that goal.     Breast Cancer screening-  "Mammograms can detect breast cancer before it has spread and early detection allows the ability to remove the lesion prior to any spread. It is similar to a small rotten spot on fruit. If the spoiled part is removed quickly it can preserve the rest of the fruit, but if it is left alone it will corrupt the whole peach.     Smoking Cessation- "Smoking is like a chimney. The tar builds up and causes a back draft which leads to a cough. Smoking is like sitting in a car with a blocked exhaust system." Smoking can increase your risk for lung, mouth, throat, nose, esophagus, bladder, kidney, ureter, pancreas, stomach, liver, cervix, ovary, bowel, and leukemia [2]. If you have smoked in the past, you might meet the criteria for a CT lung cancer screening.     Lung Cancer Screening - "The U.S. Preventive Services Task Force (USPSTF) recommendsexternal icon yearly lung cancer screening with LDCT for people who--have a 20 pack-year or more smoking history, and smoke now or have quit within the past 15 years, and are between 50 and 80 years old. A pack-year is smoking an average of one pack of cigarettes per day for one year. For example, a person could have a 20 pack-year history by smoking one pack a day for 20 years or two packs a day for 10 years." [3]    Hypertension - Common high blood pressure meds may lower colorectal cancer risk-NANCY, July 6, 2020 - Hypertension Journal Report Medications commonly prescribed to treat high blood pressure may also reduce patients' colorectal cancer risk, according to new research published today in Hypertension, an American Heart Association journal." [4].     Low HbA1c - "Higher HbA1c levels (within both the non?diabetic and diabetic ranges) were associated with the risk of colorectal cancer (Model 2; P linear?=?0.009), especially for colon cancer." [5].    A healthy diet, exercise, limitation of alcohol use, certain infections, avoidance of radiation/environmental toxins, and " staying lean lowers your cancer risk [6].     I want to empower you to make informed choices for your health. I have a listed below the most common blood components that we check for when you get blood work. I discuss what each component measures along with common reasons for why they can be abnormal. If you are interested in understanding your blood work better, please read the lab explanation attached below.     Explanation of Lab Results    Please note: This information is included as a reference to help you better understand your lab results and is not be used for diagnosis.     Lipid Panel:  Cholesterol is a measure of cardiac risk and stroke risk. A lipid panel measures total cholesterol and provides readings which are broken down into 3 subsections: Triglycerides, HDL and LDL.    Total Cholesterol: Your total blood cholesterol is a measure of LDL cholesterol, HDL cholesterol, and other lipid components.  If your individual lipid level components are in the normal range and you have an elevated total cholesterol level we are generally not to concerned since we primarily look at the LDL cholesterol which is considered the bad cholesterol which can lead to heart attacks and strokes.  Your calculated cardiac risk is the most important factor in deciding if you would benefit from a cholesterol-lowering medication that the total cholesterol level.    Triglycerides are most diet and weight sensitive. They are affected easily by lifestyle changes. Ideally, this reading should be less than 150, although if the remainder of the cholesterol panel is within normal limits, we will tolerate upwards of 250-300. For the most part, if triglycerides are the only part of your cholesterol panel reading as 'abnormal', it is best to lose weight and modify your diet, including less saturated fat and less simple sugars. We only start medication to lower this is the level is greater than 500 since this can increase ones risk for  pancreatitis. This is not the cholesterol type that leads to heart attacks.     HDL is your 'good cholesterol.' Ideally, the reading should be over 40 and is particularly helpful when it is greater than 60. Research indicates that high HDL is extremely protective; and if your HDL level is greater than 60, we tolerate far worse LDL or triglyceride levels. For the most part, HDL is responsive to aerobic activity and ideal weight. We do not have medicines that increase the HDL reading very easily.    LDL is your 'bad cholesterol.' Our goals depend on how many cardiac risk factors you have.     High LDL: If you are diabetic or have had a heart attack, we like the LDL level to be less than 100. If you have 2 cardiac risk factors (such as diabetes, hypertension, family history, a low HDL and smoking), we like your LDL to be less than 130. If you have 0-2 cardiac risk factors, we like your LDL level to be less than 160, but we may not necessarily initiate medications to 190 unless you cannot lower it. The latest guidelines recommend to consider taking a statin only if your ASCVD cardiac risk score is at least greater than 7.5% and a strong recommendation if your risk score is greater than 10%.     Low LDL: Normal or low LDL is considered good and having an LDL below the normal range is not of a concern.     Complete Blood Count (CBC):    White blood cells: These are infection fighting cells. Mild fluctuations may represent minor illness at the time of blood draw. Marked fluctuations can represent immune diseases. This can also be elevated from smoking.     High WBC: Elevation in wbc can commonly be caused by an infection, steroid use, or any cause of inflammation such as many rheumatologic diseases, surgery, or trauma.      Low WBC: Many different things can cause this such as infections, medications, rheumatologic disorders, malignancies, nutritional deficiencies, and a normal variant in some individuals. If this occurs  it is common practice to rule out a few viruses such as HIV, Hep C, B12, folate along with a a peripheral blood smear to look for abnormalities. If you are otherwise healthy with no concerning symptoms and the workup is negative we usually monitor it with yearly blood work.  If there are other abnormal cell line abnormalities sometimes we refer to hematology to further evaluate.    Hemoglobin: A key indicator for anemia. Ranges depend on age. If you are significantly out of this range, we may need to talk with you.    High Hemoglobin: This can be elevated in some blood disorders, sleep apnea, chronic lung disease, kidney disease, testosterone use, dehydration, smoking, along with some other rare causes.     Low Hemoglobin: Many things can lead to this but the most common cause is an iron deficiency in females who lose blood during their periods, decreased absorption due to antacids, poor diet, sickle cell, anemia of chronic disease, malignancy, bleeding from the gut, along with some other less common causes. If you are a young female who has periods it is usually assumed that this is from that blood loss unless other symptoms or abnormal blood work is present. If you are above 45 and have an iron deficiency it is always recommended to get a colonoscopy to ensure that there is no lesion causing blood loss and to exclude malignancy.     Hematocrit: Another way of looking at anemia, much like your hemoglobin. If you are significantly out of this range, we may need to talk with you.    MCV: This looks at the size of your red blood cells.     High MCV:  A large number may indicate a B-12 deficiency, folate deficiency, alcohol use, liver disease, medication-induced phenomenon, or a need for further workup.    Low MCV: A small number may imply iron anemia or genetic disorder such as alpha-thalassemia. We may check iron studies called to see if you are low.    MCH: Much like MCV above.    Platelets: These are clotting  factors. We tolerate a broad range in this. If your numbers are less than 100, we may need to work it up.     High Platelet Count: If your numbers are elevated, this could represent ongoing inflammation within the body which can be caused by any infection, illness, iron deficiency, or other malignancy. We usually recheck it to monitor for improvement and if it does not resolve will work it up with more blood work.     Low Platelet Count: Many things can cause this such as infections, alcohol use, medications, liver disease, vitamin deficiencies, aspleenia, and some other rare blood disorders. If it persists many times we refer to hematology if a cause is not identified.     Iron:  This is not a reliable blood marker since this fluctuates throughout the day and if you are fasting many times your iron level in your blood is low but this does not necessarily mean you have a deficiency.  There are more reliable ways to measure your iron stores and these are discussed below.    Transferrin:  Many things can cause an abnormal result and this is not as important as some other labs mentioned below.    TIBC:  This is the total iron-binding capacity and this measures the total amount of iron that can be bound by proteins in the blood.  If you have an elevated TIBC this is a good marker that you could be low on iron and this is useful blood marker.  A simple way to think of this is seats in a car. The TIBC is the number of open seats that iron can sit in. If you have a high TIBC (number of open seats) that means you have a low iron level.     Ferritin:  A low ferritin is almost always caused from an iron deficiency.  A normal ferritin level does not need necessarily exclude an iron deficiency since many inflammatory conditions such as kidney disease, diabetes, arthritis, and obesity can raise this level hiding an iron deficiency.  If you are healthy with no inflammatory conditions this is a very reliable test for detecting an  iron deficiency since nothing else lowers your ferritin level except a low iron level. Keep in mind that you can have an iron deficiency if your ferritin level is normal but your TIBC is elevated.  If you have a low iron level the next step is always to identify why you have a low iron level.    CMP (Comprehensive Metabolic Panel):  Broadly, this is a test of organ function including the kidneys, liver, and electrolyte levels.    Glucose: This is primarily looking for diabetes. We like your blood glucose level to be less than 100 when fasting. Readings of 100-125 indicate what we call 'pre-diabetes' or 'glucose intolerance.' This does not necessarily indicate diabetes, but we may check another test called a hemoglobin A1C for confirmation. This level puts you at great risk for becoming a true diabetic and we would encourage the reduction of simple sugars and processed white flour as well as appropriate weight loss. If this number is greater than 125, it is likely you are diabetic; we will get an additional hemoglobin A1C test and will likely schedule you for an appointment. If you notice that your blood glucose is above 125 and we have not scheduled a follow-up appointment, please call us. Patients who are known diabetics can have readings greater than 125.    Urea Nitrogen: This is a kidney function and maybe elevated because of mild dehydration or because of excessive muscle breakdown from aggressive exercise habits.    Creatinine: This also is a kidney function test. It may be mildly elevated if you have particularly large muscle bulk or taking a supplement like creatine. It is related to the GFR. It is a muscle product that we track to look at your kidney function. If this is elevated and new, we may need to talk with you. If you have had this mildly elevated in the past, it is likely that we will just track it to ensure that it does not worsen quickly. Some medications may gently worsen this, namely blood  pressure pills called ace inhibitors. To some degree, we will permit levels of up to 1.6.    Sodium: This is essentially the concentration of salt within the body. This may be mildly low because of dehydration, overhydration, diuretics, .    Potassium: This is an electrolyte that can cause muscular cramping or cardiac difficulties. It is sometimes lowered by diuretic medications.    Chloride: For the most part, this is only relevant if the other electrolytes are abnormal.    CO2: This is a function of acid balance within the body. For the most part, mild abnormalities are not important and may represent a starvation or dehydration state when blood was drawn. Many medications can change this level as well such as diuretics, acetazolamide, calcium carbonate, laxatives, aspirin, and many others.    High CO2: Many things can cause this which includes dehydration, sleep apnea, and COPD.     Low CO2: Many things can lead to a low level and if you are generally healthy we are usually  not concerned. Diarrhea, renal disease, diabetes, and many medications can cause this.     Anion Gap: Only relevant if your CO2 is abnormal.    Calcium: This is not related to dietary intake of calcium. It may fluctuate gently based on the amount of protein within your body. If it is above 10.9, we may need to do additional testing. Many times if low the albumin level is low and once the corrected calcium level is calculated the calcium is in a normal range.     Total protein: This looks at protein within the body. Markedly elevated levels can represent an immune response and may require further workup.    Albumin: This may be elevated because of particularly high level of fitness. If it is markedly suppressed, it may represent organ dysfunction, particularly in the liver or kidneys.    AST and ALT: These are enzymes in the liver and if elevated can indicate liver damage.     Elevated AST/ALT: Many things can caused elevated liver enzymes and  the most common reason is viral infections, alcohol use, medications, supplements, autoimmune, along with some other rare causes. One of the most common reasons for a mild elevation in liver enzymes (less than 3 times the upper limit) is fatty liver disease. Many individuals who have extra fat in their diet store this in the liver and this can build up and cause a mild elevation. This is usually diagnosed with a liver ultrasound and exclusion of other causes. The only treatment for this is diet and exercise along with avoidance of liver toxic medications and alcohol. It is standard of care to rule our viral hepatitis and get imaging of the liver if elevated. This is monitored and if I feel concerned will refer to hepatology.     Alk Phos: Part of liver function or bones    High Alk Phos: elevated levels may indicate a liver injury or obstruction of bile flow. Elevated levels can also be seen in vitamin D deficiency, drug induced, or bone disorders.     Low Alk Phos: In most cases having a low Alkaline Phosphatase enzyme activity is not due to any disease and is simply a normal variant.  Having an elevated Alk Phos is more concerning and associated with many diseases and thus I would not be overly concerned with a low level which is seen in many health individuals. The reasons for a low serum alkaline phosphatase activity were reviewed in a 1-yr retrospective study and in this study it was found that no cause was found in most cases.  Low activity values were recorded in several individuals in the absence of any obvious cause. This would suggest that the definition of the lower limit of the reference range for alkaline phosphatase is arbitrary, thus limiting the use of low serum activity as a marker of disease.  In some cases micronutrients like Zinc (Zn) and Magnesium (Mg) are causes of low ALP activity and you can take zinc or magnesium supplements. Unfortunately, the blood work to measure zinc and magnesium levels  are unreliable and not very accurate since it does not test for the intracellular zinc or magnesium levels.     Jorge L SHAWD, Adrián LOPEZ, Hugo LUCIA. Clinical significance of a low serum alkaline phosphatase. Net J Med. 1992 Feb;40(1-2):9-14. PMID: [id number].  Dariusz BAPTISTE S, Americo B, Joana I, Behera S, aDriusz S. Low Alkaline Phosphatase (ALP) In Adult Population an Indicator of Zinc (Zn) and Magnesium (Mg) Deficiency. Curr Res Nutr Food Sci 2017;5(3). doi : http://dx.[email or web address]    Total Bilirubin: This is also part of liver function.    High T Bili: If you have right upper quadrant pain an elevation in total bilirubin can be caused by a gallbladder stone that is blocking the biliary tract that leads to your gastrointestinal tract. If you have fever and right upper quadrant pain this can sometimes be elevated when your gallbladder is infected and most individuals have nausea with vomiting associated with it. If you have no symptoms and are otherwise healthy this can be caused by Gilbert syndrome which is a benign normal variant. There are other causes such as some anemias but there would be abnormal blood counts.     Low T Bili: Nothing to be concerned about.     Thyroid Stimulating Hormone (TSH): This reading is an indicator of your thyroid function. The thyroid regulates energy levels throughout the entire body, affecting almost every organ system. This is an inverse relationship so a high number actually presents a low thyroid. If this is abnormal, we will often check an additional lab called a Free T4 to evaluate this more carefully. Borderline elevations, those of 5-10, can be watched or worked up further. Please do not take the supplement Biotin for at least a week prior to getting your TSH checked since this can lead to false measurement levels.     Prostate Specific Antigen (PSA): (Men only.) This is a prostate cancer screening test, and is no longer a routine screening test. Levels are truly a  "function of age. Being less than 4 is typical for someone more in their 60s. If you are young, it should probably be less than 3. A higher PSA result does not necessarily mean that you have cancer, but may indicate a need for a discussion with your provider. Options include observation to look at rate of rise or prostate biopsy. Not only is the absolute value important, but how much it has changed from previous years. Please ensure that there is not a dramatic rise from previous years.    Please Note: This information is included as a reference to help you better understand your lab results and is not be used for diagnosis.    Frequently asked questions    When should I take my blood pressure medication?    The latest studies show that taking your blood pressure medication at night is the best time. A recent study showed that this prevents more heart attacks and strokes. See the answer below from [place].     "Q. I've taken blood pressure medicines every morning for many years, and they keep my pressure under control. Recently, my doctor recommended taking them at bedtime, instead. Does that make sense?    A. It actually does make sense -- based on recent research. For many years, there have been at least three theoretical reasons for taking blood pressure medicines before bedtime. First, a body system that strongly affects blood pressure, called the renin-angiotensin system, has its peak activity during sleep. Second, circadian rhythms cause differences in the body chemistry at night compared with daytime. Third, most heart attacks occur in the morning, before medicines taken in the morning have a chance to "kick in."" [6].     When should I take my cholesterol medication?    It used to be recommended to take your cholesterol medication at night since the original statins that lowered cholesterol did not last 24 hours and most cholesterol synthesis is done at night. The long acting statins such as atorvastatin and " "rosuvastatin last 24 hours so they can be taken any time during the day. Simvastatin, pravastatin, fluvastatin, and lovastatin are shorter acting and should be taken at bed time.     Can supplements affect my blood work?    Yes they can. A very important supplement to not take for at least a week prior to your blood work is biotin. "Biotin supplement use is common and can lead to the false measurement of thyroid hormone in commonly used assays." [8.]    What are conditions that should not be addressed during a virtual visit?    There are some conditions that should not be evaluated via a virtual visit since optimal care is impossible. Chest pain, shortness of breath, lung conditions, abdominal pain, and any neurological complaint such as headache, dizziness, numbness ect.         When will I get commentary of my blood work?    I review all blood work that you get and I will send out commentary on this via [facility]) within 72 hours. In most cases you will get a message from me sooner, but many times not all of the blood work is completed thus I usually wait until all results have returned. If there is a critical abnormality you should be contacted the same day you got blood work.     How frequently do I need to have visits to get controlled substances?    It is standard protocol to have a visit every 3 months if you are taking scheduled substances such as ADHD medications, psychiatric medications, and pain medications. This is to ensure your safety and monitor for any side effects.     When should I bring prior to a visit if I want to lose weight?    I recommend that you make a food diary for a week and fill out what you ate each day. You can bring this form in to your visit and I can look over it to suggest changes that you can make.     Which over the counter medications should I avoid if I have decreased kidney function?    NSAIDs which includes ibuprofen (Advil, Motrin, Nuprin), naproxen (Aleve), meloxicam (Mobic) " and diclofenac(Zorvolex, Voltaren) and ketorolac (Toradol) can damage your kidneys if you take this long term.Tylenol does not affect your kidney and thus is safe as long as you don't have liver disease.     Is there an [facility] pharmacy?     Yes! The [facility] is located on the first floor of the [facility]. The address is listed below. You can get curbside pickup if you call their number at [phone]. One of the many benefits of using the [facility] pharmacy is that the pharmacists can contact me directly if a cheaper alternative is available to save you money. They also see your note to know more about what the medication was prescribed for. I recommend this pharmacy since communication with me is quick in case any confusion arises on your medications.     Jairo Rodriguez.  [address] [place] [id number]  Phone: [phone]    Hours:  Monday - Friday  8:00 a.m. - 5:30 a.m.    Why is it not in my best interest to call in order to get an antibiotic?    Medicine is a complex field and many times the correct diagnosis is critical in order to provide the correct care. One of the most important goals of a healthcare provider is to ensure that no dangerous condition is masquerading as a mild illness. Specific questions are very important to obtain during an examination that provide a wealth of information to understand your illness. Health care providers are trained to investigate for signs that can be dangerous to your health. Messaging or calling the office in order to get an antibiotic can be very dangerous.     For example, many urinary tract infections can lead to an infection in the kidney that can result in a serious blood stream infection that can lead to hospitalization if not recognized. A cough can be caused by many different things and not necessarily an infection. It is not uncommon that one assumes a cough is from an infection when it is actually caused by a blood clot in the lungs. This can lead to  death. Determining your risk can only be performed after a thorough history and examination. A few sentences through e-mail is not enough.     What are some common symptoms that should be evaluated by the emergency department and not by phone or e-mail?    This does not include every symptom, but common examples of symptoms that should prompt one to go to the emergency department are chest pain, chest pressure, shortness of breath, difficulty breathing, abdominal pain, weakness or numbness or an extremity, sudden weakness or drooping on one side of the body, speaking difficulty, unusual or bad headache (particularly if it started suddenly), head injury, confusion, seizure, passing out, lightheaded, pain in arm or jaw, suddenly not able to speak, see, walk, or move, dizziness, neck stiffness, suicidal thoughts, testicular pain, cuts and wounds, severe pain, along with many others. This is not an inclusive list.     Outside Records    It is common to have an colonoscopy, mammogram, PAP smear, or eye exam done outside the [facility] system. Many times we do not get the records automatically sent to us. Please call your provider's office to notify them to fax us your records so that we can have the most up to date information. Your provider will review your outside results in order to provide you with the complete care that you deserve. We appreciate that you decided to choose us to be serving on your healthcare team and the more information we have about your health is essential.     If I have a psychiatric crisis what should I do?    If you ever feel that there is a risk of a harm to yourself we recommend to go to the emergency room. There is a National Suicide Prevention lifeline number 9-067-797-TALK which route you to the nearest crisis center. There is also a suicide hotline 6-485-GUQZKKR (1-510.772.9221).    988 has been designated as the new three-digit dialing code that will route callers to the National Suicide  Prevention Lifeline (now known as the 988 Suicide & Crisis Lifeline), and is now active across the [place].    When people call, text, or chat 988, they will be connected to trained counselors that are part of the existing Lifeline network. These trained counselors will listen, understand how their problems are affecting them, provide support, and connect them to resources if necessary.    The previous Lifeline phone number (1-209.221.5083) will always remain available to people in emotional distress or suicidal crisis.    The [place] network of over 200 crisis centers has been in operation since 2005, and has been proven to be effective. Its the counselors at these local crisis centers who answer the contacts the Lifeline receives every day. Numerous studies have shown that callers feel less suicidal, less depressed, less overwhelmed and more hopeful after speaking with a Lifeline counselor.     E-Visits    E-Visits are currently available for three conditions: Urinary tract infections, Sinus symptoms, and rashes.     If you have one of these infections or rash you can fill out a questionnaire that will be sent to your provider who can review and send an appropriate medication. No visit is needed.      What is an E-Visit?    An E-Visit is a way to get care for certain conditions without needing to schedule a virtual visit or to come into the clinic. We'll ask you some clinically based questions about yourself and your symptoms and your provider will evaluate, make a diagnosis and respond with a care plan with recommendations including testing and medications as indicated, just as they would in an in-person setting.  If it becomes clear that you need to be seen virtually or in-person after the E-Visit, we can arrange that.         Should I use an E-Visit?    E-Visits should be used only for non-urgent medical conditions. If you need urgent medical care, please contact your clinic by phone, schedule a  "same-day appointment online or find a nearby [facility]. For serious medical emergencies, please call 911 immediately.         What to expect during an E-Visit   Once you have agreed to an E-Visit, you will be prompted to complete the E-Visit clinical questionnaire in [facility], which can take 10-20 minutes to complete. You may also be asked to confirm your medications.     Since this is a medical evaluation, it is billable to your insurance. Because this is a billable visit, you may also be asked for your insurance details and to enter your credit card information, just as you would for any other visit or co-pay. Much of this is stored in your account, so it should be easy to access or update if needed. You may receive a bill for this service, including any applicable copay amount, after the service is rendered.     Please allow up to 24 business hours for a reply. At any point in the E-Visit process, if you have not received a response within 72 hours, please call your clinic.        To initiate the E-Visit process in [facility], click here.       E-Consults    E-Consults are available when you need to have a specialists opinion on one thing and your PCP agrees to send an E-Consult to answer your question within 48 hours. This not only saves you time but money as well since a visit is not always needed.          Patient Education       Checking Your Blood Pressure at Home   The Basics   Written by the doctors and editors at [facility]   How is blood pressure measured? -- Blood pressure is usually measured with a device that goes around your upper arm. This is often done in a doctor's office. But some people also check their blood pressure themselves, at home or at work.  Blood pressure is explained with 2 numbers. For instance, your blood pressure might be "140 over 90." The first (top) number is the pressure inside your arteries when your heart is emily. The second (bottom) number is the pressure inside " "your arteries when your heart is relaxed. The table shows how doctors and nurses define high and normal blood pressure (table 1).  If your blood pressure gets too high, it puts you at risk for heart attack, stroke, and kidney disease. High blood pressure does not usually cause symptoms. But it can be serious.  What is a home blood pressure meter? -- A home blood pressure meter (or "monitor") is a device you can use to check your blood pressure yourself. It has a cuff that goes around your upper arm (figure 1). Some devices have a cuff that goes around your wrist instead. But doctors aren't sure if these work as well. The meter also has a small screen, or dial, that shows your blood pressure numbers.  There are also special meters you can wear for a day or 2. These are different because they automatically check your blood pressure throughout the day and night, even while you are sleeping. If your doctor thinks you should use one of these devices, they will talk to you about how to wear it.  Why do I need to check my blood pressure at home? -- If your doctor knows or suspects that you have high blood pressure, they might want you to check it at home. There are a few reasons for this. Your doctor might want to look at:  Whether your blood pressure measures the same at home as it did in the doctor's office  How well your blood pressure medicines are working  Changes in your blood pressure, for example, if it goes up and down  People who check their own blood pressure at home usually do better at keeping it low.  How do I choose a home blood pressure meter? -- When choosing a home blood pressure meter, you will probably want to think about:  Cost - Some devices cost more than others. You should also check to see if your insurance will help pay for your device.  Size - It's important to make sure the cuff fits your arm comfortably. Your doctor or nurse can help you with this.  How easy it is to use - You should make sure " you understand how to use the device. You also need to be able to read the numbers on the screen.  You do not need a prescription to buy a home blood pressure meter. You can buy them at most pharmacies or over the internet. Your doctor or nurse can help you choose the right device for you.  How do I check my blood pressure at home? -- Once you have a home blood pressure meter, your doctor or nurse should check it to make sure it fits you and works correctly.  When it's time to check your blood pressure:  Go to the bathroom and empty your bladder first. Having a full bladder can temporarily increase your blood pressure, making the results inaccurate.  Sit in a chair with your feet flat on the ground.  Try to breathe normally and stay calm.  Attach the cuff to your arm. Place the cuff directly on your skin, not over your clothing. The cuff should be tight enough to not slip down, but not uncomfortably tight.  Sit and relax for about 3 to 5 minutes with the cuff on.  Follow the directions that came with your device to start measuring your blood pressure. This might involve squeezing the bulb at the end of the tube to inflate the cuff (fill it with air). With some monitors, you just need to press a button to inflate the cuff. When the cuff fills with air, it feels like someone is squeezing your arm, but it should not hurt. Then you will slowly deflate the cuff (let the air out of it), or it will deflate by itself. The screen or dial will show your blood pressure numbers.  Stay seated and relax for 1 minute, then measure your blood pressure again.  How often should I check my blood pressure? -- It depends. Different people need to follow different schedules. Your doctor or nurse will tell you how often to check your blood pressure, and when. Some people need to check their blood pressure twice a day, in the morning and evening.  Your doctor or nurse will probably tell you to keep track of your blood pressure for at least  "a few days (table 2). Then they will look at the numbers. The reason for this is that it's normal for your blood pressure to change a bit from day to day. For example, the numbers might change depending on whether you recently had caffeine, just exercised, or feel stressed. Checking your blood pressure over several days - or longer - will give your doctor or nurse a better idea of what is average for you.  How should I keep track of my blood pressure? -- Some blood pressure meters will record your numbers for you, or send them to your computer or smartphone. If yours does not do this, you will need to write them down. Your doctor or nurse can help you figure out the best way to keep track of the numbers.  What if my blood pressure is high? -- Your doctor or nurse will tell you what to do if your blood pressure is high when you check it at home. If you get a number that is higher than normal, measure it again to see if it is still high. If it is very high (above a certain number, which your doctor or nurse will tell you to watch out for), you should call your doctor right away.  If your blood pressure is only a little high, your doctor or nurse might tell you to keep checking it for a few more days or weeks, and then call if it does not go back down. Then they can help you decide what to do next.  All topics are updated as new evidence becomes available and our peer review process is complete.  This topic retrieved from [employer] on: Sep 21, 2021.  Topic 067838 Version 4.0  Release: 29.4.2 - C29.263  © 2021 UpToDate, Inc. and/or its affiliates. All rights reserved.  table 1: Definition of normal and high blood pressure  Level  Top number  Bottom number    High 130 or above 80 or above   Elevated 120 to 129 79 or below   Normal 119 or below 79 or below   These definitions are from the American College of Cardiology/American Heart Association. Other expert groups might use slightly different definitions.  "Elevated " "blood pressure" is a term doctor or nurses use as a warning. It means you do not yet have high blood pressure, but your blood pressure is not as low as it should be for good health.  Graphic 18598 Version 6.0  figure 1: Using a home blood pressure meter     This is an example of a person using a home blood pressure meter.  Graphic 263208 Version 1.0    Consumer Information Use and Disclaimer   This information is not specific medical advice and does not replace information you receive from your health care provider. This is only a brief summary of general information. It does NOT include all information about conditions, illnesses, injuries, tests, procedures, treatments, therapies, discharge instructions or life-style choices that may apply to you. You must talk with your health care provider for complete information about your health and treatment options. This information should not be used to decide whether or not to accept your health care provider's advice, instructions or recommendations. Only your health care provider has the knowledge and training to provide advice that is right for you. The use of this information is governed by the [employer] End User License Agreement, available at https://www.[employer]/en/solutions/[employer]/about/filippo.The use of [employer] content is governed by the [employer] Terms of Use. ©2021 UpToDate, Inc. All rights reserved.  Copyright   © 2021 UpToDate, Inc. and/or its affiliates. All rights reserved.      Daily Blood Pressure Log    Please print this form to assist you in keeping track of your blood pressure at home.      Name:  Date of Birth:       Average Blood Pressure:           Date: Time  (a.m.) Blood  Pressure: Pulse  Rate: Time  (p.m.) Blood  Pressure : Pulse  Rate: Comments:   Sample 8:37 127/83 84    Stressful morning                                                                                                                                                       "                                               Wishing you good health,     Dawson Granado MD     References:  1-https://www.[email or web address]/speakers/chase_chens  2-https://www.[employer]com.au/news/lgzqe-hdk-89-cancers-that-can-yr-ilppnk-eh-smoking/  3-https://www.cdc.gov/cancer/lung/basic_info/screening.htm  4-https://newsroom.heart.org/news/common-hypertension-medications-may-reduce-colorectal-cancer-risk  5-Ceferinoo A, Livier M, Sawada N, et al. High hemoglobin A1c levels within the non-diabetic range are associated with the risk of all cancers. Int J Cancer. 2016;138(7):0588-4616. doi:10.1002/ijc.48257  6-https://www.health.[place].edu/newsletter_article/the-10-commandments-of-cancer-prevention  7-https://www.health.[place].edu/diseases-and-conditions/should-i-take-blood-pressure-medications-at-night  8-Sammie SIMPSON et al 2018 Prevalence of biotin supplement usage in outpatients and plasma biotin concentrations in patients presenting to the emergency department. Clin Biochem. Epub 2018 Jul 20. PMID: [id number].

## 2023-01-03 ENCOUNTER — PATIENT OUTREACH (OUTPATIENT)
Dept: ADMINISTRATIVE | Facility: OTHER | Age: 43
End: 2023-01-03
Payer: COMMERCIAL

## 2023-01-03 NOTE — PROGRESS NOTES
CHW - Initial Contact    This Community Health Worker completed  the Social Determinant of Health questionnaire with patient via telephone today.    Pt identified barriers of most importance are: patient stated that he needs help with mortgage and medical bills    Referrals to community agencies completed with patient/caregiver consent outside of [facility] include: no chw is mailing out information to patient to help with resources  Referrals were put through [facility] - no:   Support and Services: yes  Other information discussed the patient needs / wants help with: no   Follow up required:   Follow-up Outreach - Due: 1/19/2023

## 2023-01-05 ENCOUNTER — EXTERNAL HOME HEALTH (OUTPATIENT)
Dept: HOME HEALTH SERVICES | Facility: HOSPITAL | Age: 43
End: 2023-01-05
Payer: COMMERCIAL

## 2023-01-09 ENCOUNTER — LAB VISIT (OUTPATIENT)
Dept: LAB | Facility: HOSPITAL | Age: 43
End: 2023-01-09
Attending: INTERNAL MEDICINE
Payer: COMMERCIAL

## 2023-01-09 DIAGNOSIS — E66.01 SEVERE OBESITY WITH BODY MASS INDEX (BMI) OF 35.0 TO 39.9 WITH COMORBIDITY: ICD-10-CM

## 2023-01-09 DIAGNOSIS — N18.5 STAGE 5 CHRONIC KIDNEY DISEASE NOT ON CHRONIC DIALYSIS: ICD-10-CM

## 2023-01-09 DIAGNOSIS — I50.31 ACUTE HEART FAILURE WITH PRESERVED EJECTION FRACTION: ICD-10-CM

## 2023-01-09 DIAGNOSIS — I13.0 HYPERTENSIVE HEART AND RENAL DISEASE WITH CONGESTIVE HEART FAILURE: ICD-10-CM

## 2023-01-09 DIAGNOSIS — R80.1 PERSISTENT PROTEINURIA: ICD-10-CM

## 2023-01-09 DIAGNOSIS — I27.20 PULMONARY HYPERTENSION: ICD-10-CM

## 2023-01-09 DIAGNOSIS — N18.5 CKD (CHRONIC KIDNEY DISEASE) STAGE 5, GFR LESS THAN 15 ML/MIN: ICD-10-CM

## 2023-01-09 DIAGNOSIS — D63.1 ANEMIA DUE TO STAGE 5 CHRONIC KIDNEY DISEASE, NOT ON CHRONIC DIALYSIS: ICD-10-CM

## 2023-01-09 DIAGNOSIS — N18.5 ANEMIA DUE TO STAGE 5 CHRONIC KIDNEY DISEASE, NOT ON CHRONIC DIALYSIS: ICD-10-CM

## 2023-01-09 LAB
25(OH)D3+25(OH)D2 SERPL-MCNC: 27 NG/ML (ref 30–96)
ALBUMIN SERPL BCP-MCNC: 3.6 G/DL (ref 3.5–5.2)
ANION GAP SERPL CALC-SCNC: 11 MMOL/L (ref 8–16)
BASOPHILS # BLD AUTO: 0.01 K/UL (ref 0–0.2)
BASOPHILS NFR BLD: 0.2 % (ref 0–1.9)
BUN SERPL-MCNC: 70 MG/DL (ref 6–20)
CALCIUM SERPL-MCNC: 8.8 MG/DL (ref 8.7–10.5)
CHLORIDE SERPL-SCNC: 103 MMOL/L (ref 95–110)
CO2 SERPL-SCNC: 25 MMOL/L (ref 23–29)
CREAT SERPL-MCNC: 7.2 MG/DL (ref 0.5–1.4)
DIFFERENTIAL METHOD: ABNORMAL
EOSINOPHIL # BLD AUTO: 0.1 K/UL (ref 0–0.5)
EOSINOPHIL NFR BLD: 2 % (ref 0–8)
ERYTHROCYTE [DISTWIDTH] IN BLOOD BY AUTOMATED COUNT: 15.6 % (ref 11.5–14.5)
EST. GFR  (NO RACE VARIABLE): 9.1 ML/MIN/1.73 M^2
GLUCOSE SERPL-MCNC: 112 MG/DL (ref 70–110)
HCT VFR BLD AUTO: 31.5 % (ref 40–54)
HGB BLD-MCNC: 10.1 G/DL (ref 14–18)
IMM GRANULOCYTES # BLD AUTO: 0.01 K/UL (ref 0–0.04)
IMM GRANULOCYTES NFR BLD AUTO: 0.2 % (ref 0–0.5)
LYMPHOCYTES # BLD AUTO: 1 K/UL (ref 1–4.8)
LYMPHOCYTES NFR BLD: 21.4 % (ref 18–48)
MCH RBC QN AUTO: 28.5 PG (ref 27–31)
MCHC RBC AUTO-ENTMCNC: 32.1 G/DL (ref 32–36)
MCV RBC AUTO: 89 FL (ref 82–98)
MONOCYTES # BLD AUTO: 0.6 K/UL (ref 0.3–1)
MONOCYTES NFR BLD: 12.4 % (ref 4–15)
NEUTROPHILS # BLD AUTO: 2.9 K/UL (ref 1.8–7.7)
NEUTROPHILS NFR BLD: 63.8 % (ref 38–73)
NRBC BLD-RTO: 0 /100 WBC
PHOSPHATE SERPL-MCNC: 4.8 MG/DL (ref 2.7–4.5)
PLATELET # BLD AUTO: 226 K/UL (ref 150–450)
PMV BLD AUTO: 11.7 FL (ref 9.2–12.9)
POTASSIUM SERPL-SCNC: 4.3 MMOL/L (ref 3.5–5.1)
RBC # BLD AUTO: 3.54 M/UL (ref 4.6–6.2)
SODIUM SERPL-SCNC: 139 MMOL/L (ref 136–145)
URATE SERPL-MCNC: 11 MG/DL (ref 3.4–7)
WBC # BLD AUTO: 4.53 K/UL (ref 3.9–12.7)

## 2023-01-09 PROCEDURE — 82306 VITAMIN D 25 HYDROXY: CPT | Mod: TXP | Performed by: INTERNAL MEDICINE

## 2023-01-09 PROCEDURE — 84550 ASSAY OF BLOOD/URIC ACID: CPT | Mod: TXP | Performed by: INTERNAL MEDICINE

## 2023-01-09 PROCEDURE — 80069 RENAL FUNCTION PANEL: CPT | Mod: TXP | Performed by: INTERNAL MEDICINE

## 2023-01-09 PROCEDURE — 84156 ASSAY OF PROTEIN URINE: CPT | Mod: NTX | Performed by: INTERNAL MEDICINE

## 2023-01-09 PROCEDURE — 83970 ASSAY OF PARATHORMONE: CPT | Mod: NTX | Performed by: INTERNAL MEDICINE

## 2023-01-09 PROCEDURE — 36415 COLL VENOUS BLD VENIPUNCTURE: CPT | Mod: PO,NTX | Performed by: INTERNAL MEDICINE

## 2023-01-09 PROCEDURE — 85025 COMPLETE CBC W/AUTO DIFF WBC: CPT | Mod: TXP | Performed by: INTERNAL MEDICINE

## 2023-01-09 PROCEDURE — 81001 URINALYSIS AUTO W/SCOPE: CPT | Mod: 91,TXP | Performed by: INTERNAL MEDICINE

## 2023-01-10 ENCOUNTER — TELEPHONE (OUTPATIENT)
Dept: FAMILY MEDICINE | Facility: CLINIC | Age: 43
End: 2023-01-10

## 2023-01-10 ENCOUNTER — OFFICE VISIT (OUTPATIENT)
Dept: FAMILY MEDICINE | Facility: CLINIC | Age: 43
End: 2023-01-10
Payer: COMMERCIAL

## 2023-01-10 ENCOUNTER — LAB VISIT (OUTPATIENT)
Dept: LAB | Facility: HOSPITAL | Age: 43
End: 2023-01-10
Attending: STUDENT IN AN ORGANIZED HEALTH CARE EDUCATION/TRAINING PROGRAM
Payer: COMMERCIAL

## 2023-01-10 VITALS
SYSTOLIC BLOOD PRESSURE: 190 MMHG | WEIGHT: 282.19 LBS | RESPIRATION RATE: 18 BRPM | HEART RATE: 58 BPM | OXYGEN SATURATION: 98 % | BODY MASS INDEX: 36.23 KG/M2 | TEMPERATURE: 99 F | DIASTOLIC BLOOD PRESSURE: 100 MMHG

## 2023-01-10 DIAGNOSIS — N52.9 ERECTILE DYSFUNCTION, UNSPECIFIED ERECTILE DYSFUNCTION TYPE: ICD-10-CM

## 2023-01-10 DIAGNOSIS — N18.5 STAGE 5 CHRONIC KIDNEY DISEASE NOT ON CHRONIC DIALYSIS: ICD-10-CM

## 2023-01-10 DIAGNOSIS — G47.00 INSOMNIA, UNSPECIFIED TYPE: ICD-10-CM

## 2023-01-10 DIAGNOSIS — R39.9 LOWER URINARY TRACT SYMPTOMS (LUTS): ICD-10-CM

## 2023-01-10 DIAGNOSIS — E21.3 HYPERPARATHYROIDISM: ICD-10-CM

## 2023-01-10 DIAGNOSIS — R35.0 URINARY FREQUENCY: ICD-10-CM

## 2023-01-10 DIAGNOSIS — Z00.00 HEALTHCARE MAINTENANCE: Primary | ICD-10-CM

## 2023-01-10 DIAGNOSIS — Z12.5 ENCOUNTER FOR PROSTATE CANCER SCREENING: ICD-10-CM

## 2023-01-10 DIAGNOSIS — I13.0 HYPERTENSIVE HEART AND RENAL DISEASE WITH CONGESTIVE HEART FAILURE: ICD-10-CM

## 2023-01-10 DIAGNOSIS — E55.9 VITAMIN D DEFICIENCY: Primary | ICD-10-CM

## 2023-01-10 LAB
ALBUMIN SERPL BCP-MCNC: 3.7 G/DL (ref 3.5–5.2)
ANION GAP SERPL CALC-SCNC: 10 MMOL/L (ref 8–16)
BACTERIA #/AREA URNS AUTO: NORMAL /HPF
BACTERIA #/AREA URNS AUTO: NORMAL /HPF
BILIRUB UR QL STRIP: NEGATIVE
BUN SERPL-MCNC: 67 MG/DL (ref 6–20)
CALCIUM SERPL-MCNC: 8.3 MG/DL (ref 8.7–10.5)
CHLORIDE SERPL-SCNC: 100 MMOL/L (ref 95–110)
CLARITY UR REFRACT.AUTO: CLEAR
CO2 SERPL-SCNC: 28 MMOL/L (ref 23–29)
COLOR UR AUTO: COLORLESS
COMPLEXED PSA SERPL-MCNC: 0.36 NG/ML (ref 0–4)
CREAT SERPL-MCNC: 6.8 MG/DL (ref 0.5–1.4)
CREAT UR-MCNC: 94 MG/DL (ref 23–375)
EST. GFR  (NO RACE VARIABLE): 9.7 ML/MIN/1.73 M^2
GLUCOSE SERPL-MCNC: 91 MG/DL (ref 70–110)
GLUCOSE UR QL STRIP: NEGATIVE
HGB UR QL STRIP: NEGATIVE
HYALINE CASTS UR QL AUTO: 0 /LPF
KETONES UR QL STRIP: NEGATIVE
LEUKOCYTE ESTERASE UR QL STRIP: NEGATIVE
MICROSCOPIC COMMENT: NORMAL
MICROSCOPIC COMMENT: NORMAL
NITRITE UR QL STRIP: NEGATIVE
PH UR STRIP: 7 [PH] (ref 5–8)
PHOSPHATE SERPL-MCNC: 5.9 MG/DL (ref 2.7–4.5)
POTASSIUM SERPL-SCNC: 4.3 MMOL/L (ref 3.5–5.1)
PROT UR QL STRIP: ABNORMAL
PROT UR-MCNC: 103 MG/DL (ref 0–15)
PROT/CREAT UR: 1.1 MG/G{CREAT} (ref 0–0.2)
PTH-INTACT SERPL-MCNC: 294.6 PG/ML (ref 9–77)
RBC #/AREA URNS AUTO: 0 /HPF (ref 0–4)
RBC #/AREA URNS AUTO: 0 /HPF (ref 0–4)
SODIUM SERPL-SCNC: 138 MMOL/L (ref 136–145)
SP GR UR STRIP: 1.01 (ref 1–1.03)
URN SPEC COLLECT METH UR: ABNORMAL
WBC #/AREA URNS AUTO: 0 /HPF (ref 0–5)
WBC #/AREA URNS AUTO: 0 /HPF (ref 0–5)

## 2023-01-10 PROCEDURE — 3008F PR BODY MASS INDEX (BMI) DOCUMENTED: ICD-10-PCS | Mod: CPTII,S$GLB,, | Performed by: STUDENT IN AN ORGANIZED HEALTH CARE EDUCATION/TRAINING PROGRAM

## 2023-01-10 PROCEDURE — 1159F MED LIST DOCD IN RCRD: CPT | Mod: CPTII,S$GLB,, | Performed by: STUDENT IN AN ORGANIZED HEALTH CARE EDUCATION/TRAINING PROGRAM

## 2023-01-10 PROCEDURE — 3077F SYST BP >= 140 MM HG: CPT | Mod: CPTII,S$GLB,, | Performed by: STUDENT IN AN ORGANIZED HEALTH CARE EDUCATION/TRAINING PROGRAM

## 2023-01-10 PROCEDURE — 99999 PR PBB SHADOW E&M-EST. PATIENT-LVL IV: ICD-10-PCS | Mod: PBBFAC,,, | Performed by: STUDENT IN AN ORGANIZED HEALTH CARE EDUCATION/TRAINING PROGRAM

## 2023-01-10 PROCEDURE — 3080F DIAST BP >= 90 MM HG: CPT | Mod: CPTII,S$GLB,, | Performed by: STUDENT IN AN ORGANIZED HEALTH CARE EDUCATION/TRAINING PROGRAM

## 2023-01-10 PROCEDURE — 99214 OFFICE O/P EST MOD 30 MIN: CPT | Mod: S$GLB,,, | Performed by: STUDENT IN AN ORGANIZED HEALTH CARE EDUCATION/TRAINING PROGRAM

## 2023-01-10 PROCEDURE — 1159F PR MEDICATION LIST DOCUMENTED IN MEDICAL RECORD: ICD-10-PCS | Mod: CPTII,S$GLB,, | Performed by: STUDENT IN AN ORGANIZED HEALTH CARE EDUCATION/TRAINING PROGRAM

## 2023-01-10 PROCEDURE — 3080F PR MOST RECENT DIASTOLIC BLOOD PRESSURE >= 90 MM HG: ICD-10-PCS | Mod: CPTII,S$GLB,, | Performed by: STUDENT IN AN ORGANIZED HEALTH CARE EDUCATION/TRAINING PROGRAM

## 2023-01-10 PROCEDURE — 84153 ASSAY OF PSA TOTAL: CPT | Mod: NTX | Performed by: STUDENT IN AN ORGANIZED HEALTH CARE EDUCATION/TRAINING PROGRAM

## 2023-01-10 PROCEDURE — 36415 COLL VENOUS BLD VENIPUNCTURE: CPT | Mod: PO,TXP | Performed by: STUDENT IN AN ORGANIZED HEALTH CARE EDUCATION/TRAINING PROGRAM

## 2023-01-10 PROCEDURE — 99999 PR PBB SHADOW E&M-EST. PATIENT-LVL IV: CPT | Mod: PBBFAC,,, | Performed by: STUDENT IN AN ORGANIZED HEALTH CARE EDUCATION/TRAINING PROGRAM

## 2023-01-10 PROCEDURE — 80069 RENAL FUNCTION PANEL: CPT | Mod: TXP | Performed by: STUDENT IN AN ORGANIZED HEALTH CARE EDUCATION/TRAINING PROGRAM

## 2023-01-10 PROCEDURE — 99214 PR OFFICE/OUTPT VISIT, EST, LEVL IV, 30-39 MIN: ICD-10-PCS | Mod: S$GLB,,, | Performed by: STUDENT IN AN ORGANIZED HEALTH CARE EDUCATION/TRAINING PROGRAM

## 2023-01-10 PROCEDURE — 3077F PR MOST RECENT SYSTOLIC BLOOD PRESSURE >= 140 MM HG: ICD-10-PCS | Mod: CPTII,S$GLB,, | Performed by: STUDENT IN AN ORGANIZED HEALTH CARE EDUCATION/TRAINING PROGRAM

## 2023-01-10 PROCEDURE — 3008F BODY MASS INDEX DOCD: CPT | Mod: CPTII,S$GLB,, | Performed by: STUDENT IN AN ORGANIZED HEALTH CARE EDUCATION/TRAINING PROGRAM

## 2023-01-10 RX ORDER — TRAZODONE HYDROCHLORIDE 50 MG/1
50 TABLET ORAL NIGHTLY PRN
Qty: 90 TABLET | Refills: 1 | Status: SHIPPED | OUTPATIENT
Start: 2023-01-10 | End: 2023-02-22

## 2023-01-10 RX ORDER — TAMSULOSIN HYDROCHLORIDE 0.4 MG/1
0.4 CAPSULE ORAL NIGHTLY
Qty: 30 CAPSULE | Refills: 3 | Status: SHIPPED | OUTPATIENT
Start: 2023-01-10 | End: 2023-01-11

## 2023-01-10 NOTE — TELEPHONE ENCOUNTER
----- Message from Dawson Granado MD sent at 1/10/2023  9:59 AM CST -----  Regarding: ED medication  He is on isosorbide which can not be taking with erectile dysfunction medications such as Viagra.  There are other options and when he has the appointment with Urology he can discuss safe medications.  I did put in referral for Cardiology as well see if he is able to get off the isosorbide and help with his blood pressure please set up appointment with Cardiology as well.

## 2023-01-10 NOTE — PROGRESS NOTES
[facility] Note    Subjective:    The HPI and pertinent ROS is included in the Diagnostic Impression Remarks section at the end of the note. Please see below for further details. Chief complaint is at end of note.     KENTON is a pleasant intelligent patient who is here for evaluation.     Modified Medications    No medications on file       Data reviewed 274}  Previous medical records reviewed and summarized in plan section at end of note.      If you are due for any health screening(s) below please notify me so we can arrange them to be ordered and scheduled. Most healthy patients at your age complete them, but you are free to accept or refuse. If you can't do it, I'll definitely understand. If you can, I'd certainly appreciate it!     Tests to Keep You Healthy    Last Blood Pressure <= 139/89 (1/10/2023): NO      The following portions of the patient's history were reviewed and updated as appropriate: allergies, current medications, past family history, past medical history, past social history, past surgical history and problem list.    He  has a past medical history of Hypertension.  He  has no past surgical history on file.    He  reports that he has never smoked. He has never been exposed to tobacco smoke. He has never used smokeless tobacco. He reports that he does not drink alcohol and does not use drugs.  He family history is not on file.    Review of patient's allergies indicates:   Allergen Reactions    Mushroom Swelling     Tongue swells        Tobacco Use: Low Risk     Smoking Tobacco Use: Never    Smokeless Tobacco Use: Never    Passive Exposure: Never     Physical Examination  General appearance: alert, cooperative, no distress  Neck: no thyromegaly, no neck stiffness  Lungs: clear to auscultation, no wheezes, rales or rhonchi, symmetric air entry  Heart: normal rate, regular rhythm, normal S1, S2, no murmurs, rubs, clicks or gallops  Abdomen: soft, nontender, nondistended, no  rigidity, rebound, or guarding.   Back: no point tenderness over spine  Extremities: peripheral pulses normal, no unilateral leg swelling or calf tenderness   Neurological:alert, oriented, normal speech, no new focal findings or movement disorder noted from baseline    BP Readings from Last 3 Encounters:   01/10/23 (!) 190/100   12/29/22 136/82   12/13/22 (!) 177/102     Wt Readings from Last 3 Encounters:   01/10/23 128 kg (282 lb 3 oz)   12/13/22 125.4 kg (276 lb 7.3 oz)   12/05/22 130.9 kg (288 lb 9.3 oz)     BP (!) 190/100 (BP Location: Right arm, Patient Position: Sitting, BP Method: Large (Manual))   Pulse (!) 58   Temp 98.7 °F (37.1 °C)   Resp 18   Wt 128 kg (282 lb 3 oz)   SpO2 98%   BMI 36.23 kg/m²    274}  Laboratory: I have reviewed old labs below:    274}    Lab Results   Component Value Date    WBC 4.53 01/09/2023    HGB 10.1 (L) 01/09/2023    HCT 31.5 (L) 01/09/2023    MCV 89 01/09/2023     01/09/2023     01/09/2023    K 4.3 01/09/2023     01/09/2023    CALCIUM 8.8 01/09/2023    PHOS 4.8 (H) 01/09/2023    CO2 25 01/09/2023     (H) 01/09/2023    BUN 70 (H) 01/09/2023    CREATININE 7.2 (H) 01/09/2023    ANIONGAP 11 01/09/2023    PROT 6.3 10/18/2022    ALBUMIN 3.6 01/09/2023    BILITOT 0.3 10/18/2022    ALKPHOS 64 10/18/2022    ALT 25 10/18/2022    AST 11 10/18/2022    INR 1.1 10/10/2022    CHOL 141 10/11/2022    TRIG 66 10/11/2022    HDL 39 (L) 10/11/2022    LDLCALC 88.8 10/11/2022    TSH 1.699 10/10/2022    HGBA1C 5.5 10/10/2022     Lab reviewed by me: Particular labs of significance that I will monitor, workup, or treat to improve are mentioned below in diagnostic impression remarks.    Imaging/EKG: I have reviewed the pertinent results and my findings are noted in remarks.  274}    CC:   Chief Complaint   Patient presents with    Insomnia    Urinary Frequency    Erectile Dysfunction        274}    Assessment/Plan  João Ham is a 42 y.o. male who presents to clinic  "with:  1. Healthcare maintenance    2. Hypertensive heart and renal disease with congestive heart failure    3. Stage 5 chronic kidney disease not on chronic dialysis    4. Urinary frequency    5. Encounter for prostate cancer screening    6. Lower urinary tract symptoms (LUTS)    7. Hyperparathyroidism       274}  Diagnostic Impression Remarks + HPI     Documentation entered by me for this encounter may have been done in part using speech-recognition technology. Although I have made an effort to ensure accuracy, "sound like" errors may exist and should be interpreted in context.     LUTS-increased urinary frequency worse at night no suprapubic pain does take Lasix which is likely contributing to his urinary frequency and weaning to continue this to prevent fluid overload has appointment with Nephrology.  Might need to get on dialysis.  Will put in referral for Urology no pain no fevers had a negative urinalysis will start Flomax to see if this helps and monitor check psa      Hypertension-usually lower at home no headache no chest pain or shortness of breath venue current medications very difficult to control specially with renal disease did have an elevated uric acid might start allopurinol to lower uric acid which can indirectly lower blood pressure.  Blood pressure was well controlled on last visit might be a temporary transit elevation monitor at home     CKD-has appoint Nephrology continue current blood pressure medications monitor blood work continue Arb  Hyperparathyroidism-due to kidney disease monitor electrolytes has blood with Nephrology     Hypertensive heart disease and kidney disease-continue current medications monitor    This is the extent of this pleasant patient's concerns at this present time. He did not feel chest pain upon exertion, dyspnea, nausea, vomiting, diaphoresis, or syncope. No pleuritic chest pain, unilateral leg swelling, calf tenderness, or calf pain. Negative for unintentional " weight loss night sweats and fevers. João will return to clinic in a few months for further workup and reassessment or sooner as needed. He was instructed to call the clinic or go to the emergency department or urgent care immediately if his symptoms do not improve, worsens, or if any new symptoms develop. As we discussed that symptoms could worsen over the next 24 hours he was advised that if any increased swelling, pain, or numbness arise to go immediately to the ED. Patient knows to call any time if an emergency arises. Shared decision making occurred and he verbalized understanding in agreement with this plan. I discussed imaging findings, diagnosis, possibilities, treatment options, medications, risks, and benefits. He had many questions regarding the options and long-term effects. All questions were answered. He expressed understanding after counseling regarding the diagnosis and recommendations. He was capable and demonstrated competence with understanding of these options. Shared decision making was performed resulting in him choosing the current treatment plan. Patient handout was given with instructions and recommendations. Advised the patient that if they become pregnant to alert us immediately to assess for medication changes. I also discussed the importance of close follow up to discuss labs, change or modify his medications if needed, monitor side effects, and further evaluation of medical problems.     Additional workup planned: see labs ordered below.    See below for labs and meds ordered with associated diagnosis      1. Healthcare maintenance    2. Hypertensive heart and renal disease with congestive heart failure    3. Stage 5 chronic kidney disease not on chronic dialysis  - RENAL FUNCTION PANEL; Future    4. Urinary frequency  - Ambulatory referral/consult to Urology; Future  - PSA, Screening; Future  - tamsulosin (FLOMAX) 0.4 mg Cap; Take 1 capsule (0.4 mg total) by mouth every evening.   Dispense: 30 capsule; Refill: 3    5. Encounter for prostate cancer screening  - PSA, Screening; Future    6. Lower urinary tract symptoms (LUTS)  - tamsulosin (FLOMAX) 0.4 mg Cap; Take 1 capsule (0.4 mg total) by mouth every evening.  Dispense: 30 capsule; Refill: 3    7. Hyperparathyroidism      Dawson Granado MD   274}    If you are due for any health screening(s) below please notify me so we can arrange them to be ordered and scheduled. Most healthy patients at your age complete them, but you are free to accept or refuse.     If you can't do it, I'll definitely understand. If you can, I'd certainly appreciate it!   Tests to Keep You Healthy    Last Blood Pressure <= 139/89 (1/10/2023): NO

## 2023-01-10 NOTE — TELEPHONE ENCOUNTER
Informed pt message from provider. Pt verbalized understanding. Pt has appt scheduled with urology on 1/11 and pt scheduled to see cardiology. Pt states he is out of Calcitrol and would like to know if its something he needs to keep taking. If so, can you fill in Ms. Hooper's absence?

## 2023-01-11 ENCOUNTER — OFFICE VISIT (OUTPATIENT)
Dept: UROLOGY | Facility: CLINIC | Age: 43
End: 2023-01-11
Payer: COMMERCIAL

## 2023-01-11 VITALS
SYSTOLIC BLOOD PRESSURE: 176 MMHG | HEART RATE: 69 BPM | WEIGHT: 282.19 LBS | BODY MASS INDEX: 36.22 KG/M2 | HEIGHT: 74 IN | DIASTOLIC BLOOD PRESSURE: 106 MMHG

## 2023-01-11 DIAGNOSIS — R35.0 URINARY FREQUENCY: Primary | ICD-10-CM

## 2023-01-11 DIAGNOSIS — N52.9 ERECTILE DYSFUNCTION, UNSPECIFIED ERECTILE DYSFUNCTION TYPE: ICD-10-CM

## 2023-01-11 DIAGNOSIS — N18.5 STAGE 5 CHRONIC KIDNEY DISEASE NOT ON CHRONIC DIALYSIS: ICD-10-CM

## 2023-01-11 DIAGNOSIS — I13.0 HYPERTENSIVE HEART AND RENAL DISEASE WITH CONGESTIVE HEART FAILURE: ICD-10-CM

## 2023-01-11 LAB — POC RESIDUAL URINE VOLUME: 81 ML (ref 0–100)

## 2023-01-11 PROCEDURE — 3077F PR MOST RECENT SYSTOLIC BLOOD PRESSURE >= 140 MM HG: ICD-10-PCS | Mod: CPTII,NTX,S$GLB, | Performed by: NURSE PRACTITIONER

## 2023-01-11 PROCEDURE — 3008F BODY MASS INDEX DOCD: CPT | Mod: CPTII,NTX,S$GLB, | Performed by: NURSE PRACTITIONER

## 2023-01-11 PROCEDURE — 99999 PR PBB SHADOW E&M-EST. PATIENT-LVL III: ICD-10-PCS | Mod: PBBFAC,TXP,, | Performed by: NURSE PRACTITIONER

## 2023-01-11 PROCEDURE — 99999 PR PBB SHADOW E&M-EST. PATIENT-LVL III: CPT | Mod: PBBFAC,TXP,, | Performed by: NURSE PRACTITIONER

## 2023-01-11 PROCEDURE — 1160F RVW MEDS BY RX/DR IN RCRD: CPT | Mod: CPTII,NTX,S$GLB, | Performed by: NURSE PRACTITIONER

## 2023-01-11 PROCEDURE — 99203 OFFICE O/P NEW LOW 30 MIN: CPT | Mod: NTX,S$GLB,, | Performed by: NURSE PRACTITIONER

## 2023-01-11 PROCEDURE — 3008F PR BODY MASS INDEX (BMI) DOCUMENTED: ICD-10-PCS | Mod: CPTII,NTX,S$GLB, | Performed by: NURSE PRACTITIONER

## 2023-01-11 PROCEDURE — 1159F MED LIST DOCD IN RCRD: CPT | Mod: CPTII,NTX,S$GLB, | Performed by: NURSE PRACTITIONER

## 2023-01-11 PROCEDURE — 99203 PR OFFICE/OUTPT VISIT, NEW, LEVL III, 30-44 MIN: ICD-10-PCS | Mod: NTX,S$GLB,, | Performed by: NURSE PRACTITIONER

## 2023-01-11 PROCEDURE — 4010F ACE/ARB THERAPY RXD/TAKEN: CPT | Mod: CPTII,NTX,S$GLB, | Performed by: NURSE PRACTITIONER

## 2023-01-11 PROCEDURE — 51798 US URINE CAPACITY MEASURE: CPT | Mod: NTX,S$GLB,, | Performed by: NURSE PRACTITIONER

## 2023-01-11 PROCEDURE — 4010F PR ACE/ARB THEARPY RXD/TAKEN: ICD-10-PCS | Mod: CPTII,NTX,S$GLB, | Performed by: NURSE PRACTITIONER

## 2023-01-11 PROCEDURE — 3077F SYST BP >= 140 MM HG: CPT | Mod: CPTII,NTX,S$GLB, | Performed by: NURSE PRACTITIONER

## 2023-01-11 PROCEDURE — 1159F PR MEDICATION LIST DOCUMENTED IN MEDICAL RECORD: ICD-10-PCS | Mod: CPTII,NTX,S$GLB, | Performed by: NURSE PRACTITIONER

## 2023-01-11 PROCEDURE — 1160F PR REVIEW ALL MEDS BY PRESCRIBER/CLIN PHARMACIST DOCUMENTED: ICD-10-PCS | Mod: CPTII,NTX,S$GLB, | Performed by: NURSE PRACTITIONER

## 2023-01-11 PROCEDURE — 3080F PR MOST RECENT DIASTOLIC BLOOD PRESSURE >= 90 MM HG: ICD-10-PCS | Mod: CPTII,NTX,S$GLB, | Performed by: NURSE PRACTITIONER

## 2023-01-11 PROCEDURE — 3080F DIAST BP >= 90 MM HG: CPT | Mod: CPTII,NTX,S$GLB, | Performed by: NURSE PRACTITIONER

## 2023-01-11 PROCEDURE — 51798 POCT BLADDER SCAN: ICD-10-PCS | Mod: NTX,S$GLB,, | Performed by: NURSE PRACTITIONER

## 2023-01-11 RX ORDER — CALCITRIOL 0.25 UG/1
0.25 CAPSULE ORAL DAILY
Qty: 30 CAPSULE | Refills: 3 | Status: SHIPPED | OUTPATIENT
Start: 2023-01-11 | End: 2023-06-15

## 2023-01-11 NOTE — TELEPHONE ENCOUNTER
Called patient, there was no answer left message asking to call office back at [phone] to confirm 12/16/2022 appointment with Dr. Zapata at [place] [address].  [facility] COVID SCREENING QUESTIONS    Appointment confirmed: NO    \"We strongly encourage only one visitor to accompany the patient. To ensure [facility].\"     COVID Universal Screening Question    “Do you have a fever, cough, chills, vomiting, diarrhea, headache, rash or runny nose?”     If yes, patient does have a rash, fever and flu-like symptoms. Please send encounter to the clinical support pool.     Covid-19 Screening questionnaire complete:      \"We do have free parking available in the main hospital parking lot. However, parking can be difficult to find so we ask that you arrive 40 minutes prior to your appointment.\"    Pt contacted and notified of medication refill sent to preferred pharmacy. Pt verbalized understanding.

## 2023-01-11 NOTE — PROGRESS NOTES
[facility] Note  Staff: LATASHA GenaoC    PCP: MD Loy    Chief Complaint: Urinary frequency and erectile dysfunction    Subjective:        HPI: João Ham is a 42 y.o. male NEW PT presents today for evaluation of urinary frequency and ED issues.    Last PSA Screening:   Lab Results   Component Value Date    PSA 0.36 01/10/2023     TODAY:  UA in office today showed--Pt was unable to give urine sample in office.  Pt denies gross hematuria or dysuria at this time.  ++Increased urinary frequency problems and erectile dysfunction issues.  Pt was recently started on Flomax 0.4 mg daily from his PCP office.  Recent PSA is normal.    Recent hx of CKD, Stage 5, uncontrolled HBP, and CHF!  Pt currently taking Lasix 80 mg TID since hospital admission last year.    AUA SS Today:  14/4  Feeling of ICBE:  3  Frequency:2  Intermittency:0  Urgency:4  Weak urine stream:0  Strainin  Nocturia:5  PVR by bladder scan performed by MA today:  81 mL    VERONICA Score today:  10  1.1  2.2  3.2  4.3  5.2    Recent Lab Results done on 2023:  Renal Function Panel:  BUN/Cr-67/6.8  Calcium 8.3  eGFR of 9.7!!!!  PSA level WNL    REVIEW OF SYSTEMS:  A comprehensive 10 system review was performed and is negative except as noted above in HPI    PMHx:  Past Medical History:   Diagnosis Date    CHF (congestive heart failure)     High blood pressure with chronic kidney disease, stage 5 chronic kidney disease or end stage renal disease     Hypertension      PSHx:  History reviewed. No pertinent surgical history.    Allergies:  Mushroom    Medications: reviewed     Objective:     Vitals:    23 1015   BP: (!) 176/106   Pulse: 69     General:WDWN in NAD  Eyes: PERRLA, normal conjunctiva  Respiratory: no increased work on breathing, clear to auscultation  Cardiovascular: regular rate and rhythm. No obvious extremity edema.  GI: palpation of masses. No tenderness. No hepatosplenomegaly to  palpation.  Musculoskeletal: normal range of motion of bilateral upper extremities. Normal muscle strength and tone.  Skin: no obvious rashes or lesions. No tightening of skin noted.  Neurologic: CN grossly normal. Normal sensation.   Psychiatric: awake, alert and oriented x 3. Mood and affect normal. Cooperative.    Assessment:       1. Urinary frequency    2. Stage 5 chronic kidney disease not on chronic dialysis    3. Hypertensive heart and renal disease with congestive heart failure    4. Erectile dysfunction, unspecified erectile dysfunction type            Plan:     It was thoroughly explained to pt during ov today, that his urinary frequency is most likely due to the Lasix 80 mg TID as well as new RX of Flomax daily at this time.  Advised pt we would not be proceeding further with any treatment of ED issues until other medical conditions have been evaluated and treated by PCP and Nephrology in the near future.    STOP the Flomax at this time.    Discussed conservative measures to control urgency and frequency including avoiding bladder irritants, timed voiding, not postponing voiding, and bowel regimen (as distended bowel has extrinsic compressive effect on bladder. Discussed bladder irritants include coffe (even decaf), tea, alcohol, soda, spicy foods, acidic juices (orange, tomato), vinegar, and artificial sweeteners.     F/u in 4 months after lab work is repeated to recheck kidney function, CBC, etc.    [facility]: Active    Noa Hunt, JOHN

## 2023-01-12 ENCOUNTER — PATIENT MESSAGE (OUTPATIENT)
Dept: TRANSPLANT | Facility: CLINIC | Age: 43
End: 2023-01-12
Payer: COMMERCIAL

## 2023-01-12 DIAGNOSIS — Z01.810 HIGH RISK SURGERY, PRE-OPERATIVE CARDIOVASCULAR EXAMINATION: ICD-10-CM

## 2023-01-12 DIAGNOSIS — Z76.82 ORGAN TRANSPLANT CANDIDATE: Primary | ICD-10-CM

## 2023-01-12 DIAGNOSIS — Z91.89 CARDIOVASCULAR EVENT RISK: ICD-10-CM

## 2023-01-13 ENCOUNTER — TELEPHONE (OUTPATIENT)
Dept: TRANSPLANT | Facility: CLINIC | Age: 43
End: 2023-01-13
Payer: COMMERCIAL

## 2023-01-17 ENCOUNTER — OFFICE VISIT (OUTPATIENT)
Dept: NEPHROLOGY | Facility: CLINIC | Age: 43
End: 2023-01-17
Payer: COMMERCIAL

## 2023-01-17 VITALS
HEART RATE: 69 BPM | DIASTOLIC BLOOD PRESSURE: 78 MMHG | WEIGHT: 282.19 LBS | BODY MASS INDEX: 36.22 KG/M2 | SYSTOLIC BLOOD PRESSURE: 148 MMHG | OXYGEN SATURATION: 98 % | HEIGHT: 74 IN

## 2023-01-17 DIAGNOSIS — I13.0 HYPERTENSIVE HEART AND RENAL DISEASE WITH CONGESTIVE HEART FAILURE: ICD-10-CM

## 2023-01-17 DIAGNOSIS — I16.0 MALIGNANT HYPERTENSIVE URGENCY: ICD-10-CM

## 2023-01-17 DIAGNOSIS — D63.1 ANEMIA DUE TO STAGE 5 CHRONIC KIDNEY DISEASE, NOT ON CHRONIC DIALYSIS: ICD-10-CM

## 2023-01-17 DIAGNOSIS — R80.1 PERSISTENT PROTEINURIA: ICD-10-CM

## 2023-01-17 DIAGNOSIS — E66.01 SEVERE OBESITY WITH BODY MASS INDEX (BMI) OF 35.0 TO 39.9 WITH COMORBIDITY: ICD-10-CM

## 2023-01-17 DIAGNOSIS — N18.5 STAGE 5 CHRONIC KIDNEY DISEASE NOT ON CHRONIC DIALYSIS: ICD-10-CM

## 2023-01-17 DIAGNOSIS — I10 HYPERTENSION, UNSPECIFIED TYPE: ICD-10-CM

## 2023-01-17 DIAGNOSIS — N18.5 ANEMIA DUE TO STAGE 5 CHRONIC KIDNEY DISEASE, NOT ON CHRONIC DIALYSIS: ICD-10-CM

## 2023-01-17 DIAGNOSIS — I27.20 PULMONARY HYPERTENSION: Primary | ICD-10-CM

## 2023-01-17 PROCEDURE — 99214 OFFICE O/P EST MOD 30 MIN: CPT | Mod: S$GLB,,, | Performed by: INTERNAL MEDICINE

## 2023-01-17 PROCEDURE — 4010F PR ACE/ARB THEARPY RXD/TAKEN: ICD-10-PCS | Mod: CPTII,S$GLB,, | Performed by: INTERNAL MEDICINE

## 2023-01-17 PROCEDURE — 1160F PR REVIEW ALL MEDS BY PRESCRIBER/CLIN PHARMACIST DOCUMENTED: ICD-10-PCS | Mod: CPTII,S$GLB,, | Performed by: INTERNAL MEDICINE

## 2023-01-17 PROCEDURE — 4010F ACE/ARB THERAPY RXD/TAKEN: CPT | Mod: CPTII,S$GLB,, | Performed by: INTERNAL MEDICINE

## 2023-01-17 PROCEDURE — 1159F PR MEDICATION LIST DOCUMENTED IN MEDICAL RECORD: ICD-10-PCS | Mod: CPTII,S$GLB,, | Performed by: INTERNAL MEDICINE

## 2023-01-17 PROCEDURE — 1159F MED LIST DOCD IN RCRD: CPT | Mod: CPTII,S$GLB,, | Performed by: INTERNAL MEDICINE

## 2023-01-17 PROCEDURE — 3066F PR DOCUMENTATION OF TREATMENT FOR NEPHROPATHY: ICD-10-PCS | Mod: CPTII,S$GLB,, | Performed by: INTERNAL MEDICINE

## 2023-01-17 PROCEDURE — 3078F PR MOST RECENT DIASTOLIC BLOOD PRESSURE < 80 MM HG: ICD-10-PCS | Mod: CPTII,S$GLB,, | Performed by: INTERNAL MEDICINE

## 2023-01-17 PROCEDURE — 99999 PR PBB SHADOW E&M-EST. PATIENT-LVL III: ICD-10-PCS | Mod: PBBFAC,,, | Performed by: INTERNAL MEDICINE

## 2023-01-17 PROCEDURE — 3008F PR BODY MASS INDEX (BMI) DOCUMENTED: ICD-10-PCS | Mod: CPTII,S$GLB,, | Performed by: INTERNAL MEDICINE

## 2023-01-17 PROCEDURE — 99999 PR PBB SHADOW E&M-EST. PATIENT-LVL III: CPT | Mod: PBBFAC,,, | Performed by: INTERNAL MEDICINE

## 2023-01-17 PROCEDURE — 3077F SYST BP >= 140 MM HG: CPT | Mod: CPTII,S$GLB,, | Performed by: INTERNAL MEDICINE

## 2023-01-17 PROCEDURE — 3077F PR MOST RECENT SYSTOLIC BLOOD PRESSURE >= 140 MM HG: ICD-10-PCS | Mod: CPTII,S$GLB,, | Performed by: INTERNAL MEDICINE

## 2023-01-17 PROCEDURE — 1160F RVW MEDS BY RX/DR IN RCRD: CPT | Mod: CPTII,S$GLB,, | Performed by: INTERNAL MEDICINE

## 2023-01-17 PROCEDURE — 3078F DIAST BP <80 MM HG: CPT | Mod: CPTII,S$GLB,, | Performed by: INTERNAL MEDICINE

## 2023-01-17 PROCEDURE — 3066F NEPHROPATHY DOC TX: CPT | Mod: CPTII,S$GLB,, | Performed by: INTERNAL MEDICINE

## 2023-01-17 PROCEDURE — 99214 PR OFFICE/OUTPT VISIT, EST, LEVL IV, 30-39 MIN: ICD-10-PCS | Mod: S$GLB,,, | Performed by: INTERNAL MEDICINE

## 2023-01-17 PROCEDURE — 3008F BODY MASS INDEX DOCD: CPT | Mod: CPTII,S$GLB,, | Performed by: INTERNAL MEDICINE

## 2023-01-17 RX ORDER — VALSARTAN 80 MG/1
160 TABLET ORAL DAILY
Qty: 30 TABLET | Refills: 3 | Status: SHIPPED | OUTPATIENT
Start: 2023-01-17 | End: 2023-02-10 | Stop reason: SDUPTHER

## 2023-01-17 NOTE — PROGRESS NOTES
"Subjective:       Patient ID: João Ham is a 42 y.o. Black or  male who presents for follow up on CKD.     Since last seen at nephrology clinic, João Ham denies any new hospitalizations or new medications.  No uremic symptoms, not volume overloaded.    Reports taking their medications on time, without missed doses. As to their chronic conditions:  - CKD: Denies use of NSAIDs.   2022: baseline sr cr of 7 - 7.5 mg/dL with UPCR 1.1  - HTN: uncontrolled, follows low salt diet. Denies uncontrolled HTN, dizziness/lightheadedness or hypotension/syncopal episodes.  - dHF, echo in 2022: Left ventricle is normal in size with mild concentric hypertrophy and normal systolic function. Grade III left ventricular  diastolic dysfunction. Moderate right ventricular enlargement with mildly to moderately reduced right ventricular systolic function. Severe left atrial enlargement. There is moderate pulmonary hypertension.     Review of Systems   Constitutional:  Negative for appetite change, chills and fever.   HENT:  Negative for congestion.    Eyes:  Negative for visual disturbance.   Respiratory:  Negative for cough and shortness of breath.    Cardiovascular:  Negative for chest pain and leg swelling.   Gastrointestinal:  Negative for abdominal pain, diarrhea, nausea and vomiting.   Genitourinary:  Negative for difficulty urinating, dysuria and hematuria.   Musculoskeletal:  Negative for myalgias.   Skin:  Negative for rash.   Neurological:  Negative for headaches.   Psychiatric/Behavioral:  Negative for sleep disturbance.      The past medical, family and social histories were reviewed for this encounter.     BP (!) 148/78 (BP Location: Right arm, Patient Position: Sitting, BP Method: Large (Manual))   Pulse 69   Ht 6' 2" (1.88 m)   Wt 128 kg (282 lb 3 oz)   SpO2 98%   BMI 36.23 kg/m²     Objective:      Physical Exam  Vitals reviewed.   Constitutional:       General: He is not in acute distress.     " Appearance: Normal appearance. He is well-developed. He is obese.   HENT:      Head: Normocephalic and atraumatic.      Mouth/Throat:      Mouth: Mucous membranes are moist.      Pharynx: Oropharynx is clear.   Eyes:      General: No scleral icterus.     Extraocular Movements: Extraocular movements intact.      Conjunctiva/sclera: Conjunctivae normal.   Neck:      Vascular: No JVD.   Cardiovascular:      Rate and Rhythm: Normal rate and regular rhythm.      Heart sounds: Normal heart sounds. No murmur heard.    No friction rub. No gallop.   Pulmonary:      Effort: Pulmonary effort is normal. No respiratory distress.      Breath sounds: Normal breath sounds. No wheezing or rales.   Abdominal:      General: Bowel sounds are normal. There is no distension.      Palpations: Abdomen is soft.      Tenderness: There is no abdominal tenderness.   Musculoskeletal:         General: Normal range of motion.      Cervical back: Normal range of motion.      Right lower leg: No edema.      Left lower leg: No edema.   Skin:     General: Skin is warm and dry.      Capillary Refill: Capillary refill takes less than 2 seconds.      Findings: No rash.   Neurological:      General: No focal deficit present.      Mental Status: He is alert and oriented to person, place, and time.   Psychiatric:         Mood and Affect: Mood normal.         Behavior: Behavior normal.       Assessment:       1. Pulmonary hypertension    2. Malignant hypertensive urgency    3. Hypertension, unspecified type    4. Anemia due to stage 5 chronic kidney disease, not on chronic dialysis    5. Severe obesity with body mass index (BMI) of 35.0 to 39.9 with comorbidity    6. Stage 5 chronic kidney disease not on chronic dialysis    7. Persistent proteinuria    8. 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  Baseline creatinine is 7 - 7.5  mg/dL and sub-nephrotic proteinuria  Lab Results   Component Value Date    CREATININE 6.8 (H) 01/10/2023      - Euvolemic   - Complete avoidance of NSAIDs   - Low salt diet with < 2000 mg/day   - Dose adjust medications to GFR of < 15   - Avoid nephrotoxins including IV contrast   - Does not want to start RRT    HTN   - BP un controlled: avoid hypotension and dehydration   - Increase Valsartan to 160 mg daily    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

## 2023-01-18 ENCOUNTER — CLINICAL SUPPORT (OUTPATIENT)
Dept: FAMILY MEDICINE | Facility: CLINIC | Age: 43
End: 2023-01-18
Payer: COMMERCIAL

## 2023-01-18 ENCOUNTER — TELEPHONE (OUTPATIENT)
Dept: FAMILY MEDICINE | Facility: CLINIC | Age: 43
End: 2023-01-18

## 2023-01-18 VITALS — DIASTOLIC BLOOD PRESSURE: 66 MMHG | SYSTOLIC BLOOD PRESSURE: 112 MMHG | HEART RATE: 70 BPM

## 2023-01-18 DIAGNOSIS — Z01.30 BP CHECK: Primary | ICD-10-CM

## 2023-01-18 DIAGNOSIS — Z91.89 AT RISK FOR OBSTRUCTIVE SLEEP APNEA: Primary | ICD-10-CM

## 2023-01-18 PROCEDURE — 99999 PR PBB SHADOW E&M-EST. PATIENT-LVL II: ICD-10-PCS | Mod: PBBFAC,,,

## 2023-01-18 PROCEDURE — 99999 PR PBB SHADOW E&M-EST. PATIENT-LVL II: CPT | Mod: PBBFAC,,,

## 2023-01-18 NOTE — PROGRESS NOTES
João Ham 42 y.o. male is here today for Blood Pressure check.   History of HTN yes.    Review of patient's allergies indicates:   Allergen Reactions    Mushroom Swelling     Tongue swells      Creatinine   Date Value Ref Range Status   01/10/2023 6.8 (H) 0.5 - 1.4 mg/dL Final     Sodium   Date Value Ref Range Status   01/10/2023 138 136 - 145 mmol/L Final     Potassium   Date Value Ref Range Status   01/10/2023 4.3 3.5 - 5.1 mmol/L Final   ]  Patient verifies taking blood pressure medications on a regular basis at the same time of the day.     Current Outpatient Medications:     calcitRIOL (ROCALTROL) 0.25 MCG Cap, Take 1 capsule (0.25 mcg total) by mouth once daily., Disp: 30 capsule, Rfl: 3    calcium carbonate (TUMS) 200 mg calcium (500 mg) chewable tablet, Take 2 tablets (1,000 mg total) by mouth 3 (three) times daily with meals., Disp: 180 tablet, Rfl: 11    cloNIDine (CATAPRES) 0.3 MG tablet, Take 1 tablet (0.3 mg total) by mouth 3 (three) times daily., Disp: 270 tablet, Rfl: 1    furosemide (LASIX) 80 MG tablet, Take 1 tablet (80 mg total) by mouth 3 (three) times daily with meals., Disp: 90 tablet, Rfl: 11    hydrALAZINE (APRESOLINE) 100 MG tablet, Take 1 tablet (100 mg total) by mouth 3 (three) times daily., Disp: 90 tablet, Rfl: 11    isosorbide mononitrate (IMDUR) 120 MG 24 hr tablet, Take 1 tablet (120 mg total) by mouth once daily., Disp: 30 tablet, Rfl: 11    metoprolol succinate (TOPROL-XL) 50 MG 24 hr tablet, Take 3 tablets (150 mg total) by mouth once daily., Disp: 90 tablet, Rfl: 11    sevelamer carbonate (RENVELA) 800 mg Tab, Take 2 tablets (1,600 mg total) by mouth 3 (three) times daily with meals., Disp: 180 tablet, Rfl: 11    traZODone (DESYREL) 50 MG tablet, Take 1 tablet (50 mg total) by mouth nightly as needed for Insomnia. (Patient not taking: Reported on 1/17/2023), Disp: 90 tablet, Rfl: 1    valsartan (DIOVAN) 80 MG tablet, Take 2 tablets (160 mg total) by mouth once daily., Disp:  "30 tablet, Rfl: 3    Does patient have record of home blood pressure readings no but  nurse has been taking and  Readings have been averaging 140's-170's/80's-90's  With the last two days in a row being 170's/90's.  nurse will be out to see pt. This Friday.  Last dose of blood pressure medication was taken at 9:00 am today. First dose was yesterday.    Patient denies any headaches, chest pain or chest palpitations, however he reports that since starting  The increased dose of valsartan yesterday, he feels a little "woozy" and further explains that he does not know  If it is the valsartan or the fact that he did not sleep well last night. He took the trazodone at 10:00 and did not  Fall asleep immediately, instead nodded off to sleep until about 2:00 a.m. this morning. He does add that  He feels the same way as when he started the hctz. Patient stated that he wants to fall asleep for 10:00 pm and   We both agreed that taking it an hour or two before that time, might be more effective to accomplish this.      Initial BP today was 116/70  BP after 15 minutes was 112/66 Pulse: 70    1/25 Est. [facility] - Dr. Shin  3/1/23 3 mo fu Dr. Loy Granado notified.    "

## 2023-01-18 NOTE — TELEPHONE ENCOUNTER
João Ham 42 y.o. male is here today for Blood Pressure check.   History of HTN yes.    Review of patient's allergies indicates:   Allergen Reactions    Mushroom Swelling     Tongue swells      Creatinine   Date Value Ref Range Status   01/10/2023 6.8 (H) 0.5 - 1.4 mg/dL Final     Sodium   Date Value Ref Range Status   01/10/2023 138 136 - 145 mmol/L Final     Potassium   Date Value Ref Range Status   01/10/2023 4.3 3.5 - 5.1 mmol/L Final   ]  Patient verifies taking blood pressure medications on a regular basis at the same time of the day.     Current Outpatient Medications:     calcitRIOL (ROCALTROL) 0.25 MCG Cap, Take 1 capsule (0.25 mcg total) by mouth once daily., Disp: 30 capsule, Rfl: 3    calcium carbonate (TUMS) 200 mg calcium (500 mg) chewable tablet, Take 2 tablets (1,000 mg total) by mouth 3 (three) times daily with meals., Disp: 180 tablet, Rfl: 11    cloNIDine (CATAPRES) 0.3 MG tablet, Take 1 tablet (0.3 mg total) by mouth 3 (three) times daily., Disp: 270 tablet, Rfl: 1    furosemide (LASIX) 80 MG tablet, Take 1 tablet (80 mg total) by mouth 3 (three) times daily with meals., Disp: 90 tablet, Rfl: 11    hydrALAZINE (APRESOLINE) 100 MG tablet, Take 1 tablet (100 mg total) by mouth 3 (three) times daily., Disp: 90 tablet, Rfl: 11    isosorbide mononitrate (IMDUR) 120 MG 24 hr tablet, Take 1 tablet (120 mg total) by mouth once daily., Disp: 30 tablet, Rfl: 11    metoprolol succinate (TOPROL-XL) 50 MG 24 hr tablet, Take 3 tablets (150 mg total) by mouth once daily., Disp: 90 tablet, Rfl: 11    sevelamer carbonate (RENVELA) 800 mg Tab, Take 2 tablets (1,600 mg total) by mouth 3 (three) times daily with meals., Disp: 180 tablet, Rfl: 11    traZODone (DESYREL) 50 MG tablet, Take 1 tablet (50 mg total) by mouth nightly as needed for Insomnia. (Patient not taking: Reported on 1/17/2023), Disp: 90 tablet, Rfl: 1    valsartan (DIOVAN) 80 MG tablet, Take 2 tablets (160 mg total) by mouth once daily., Disp:  "30 tablet, Rfl: 3    Does patient have record of home blood pressure readings no but  nurse has been taking and  Readings have been averaging 140's-170's/80's-90's  With the last two days in a row being 170's/90's.  nurse will be out to see pt. This Friday.  Last dose of blood pressure medication was taken at 9:00 am today. First dose was yesterday.    Patient denies any headaches, chest pain or chest palpitations, however he reports that since starting  The increased dose of valsartan yesterday, he feels a little "woozy" and further explains that he does not know  If it is the valsartan or the fact that he did not sleep well last night. He took the trazodone at 10:00 and did not  Fall asleep immediately, instead nodded off to sleep until about 2:00 a.m. this morning. He does add that  He feels the same way as when he started the hctz. Patient stated that he wants to fall asleep for 10:00 pm and   We both agreed that taking it an hour or two before that time, might be more effective to accomplish this.      Initial BP today was 116/70  BP after 15 minutes was 112/66 Pulse: 70    1/25 Est. [facility] [place] [facility] - Dr. Shin  3/1/23 3 mo fu Dr. Loy Granado notified.    "

## 2023-01-19 ENCOUNTER — TELEPHONE (OUTPATIENT)
Dept: NEPHROLOGY | Facility: CLINIC | Age: 43
End: 2023-01-19
Payer: COMMERCIAL

## 2023-01-19 ENCOUNTER — PATIENT MESSAGE (OUTPATIENT)
Dept: FAMILY MEDICINE | Facility: CLINIC | Age: 43
End: 2023-01-19
Payer: COMMERCIAL

## 2023-01-19 ENCOUNTER — PATIENT MESSAGE (OUTPATIENT)
Dept: NEPHROLOGY | Facility: CLINIC | Age: 43
End: 2023-01-19
Payer: COMMERCIAL

## 2023-01-19 NOTE — TELEPHONE ENCOUNTER
----- Message from Annemarie Gonsalves sent at 1/19/2023  8:16 AM CST -----  Type: Patient Call Back         Who called: Pt          What is the request in detail: Pt called in regarding FMLA paper work for short term Disability  . Pt states that the Paper work was filled out incorrectly          Can the clinic reply by [facility]? No          Would the patient rather a call back or a response via [facility]?call back          Best call back number: [phone] (mobile) Pt          Additional Information: Please Contact Pt            Thank You

## 2023-01-20 ENCOUNTER — TELEPHONE (OUTPATIENT)
Dept: TRANSPLANT | Facility: CLINIC | Age: 43
End: 2023-01-20
Payer: COMMERCIAL

## 2023-01-23 ENCOUNTER — PATIENT OUTREACH (OUTPATIENT)
Dept: ADMINISTRATIVE | Facility: OTHER | Age: 43
End: 2023-01-23
Payer: COMMERCIAL

## 2023-01-23 NOTE — PROGRESS NOTES
CHW - Outreach Attempt    Community Health Worker left a voicemail message for 1st attempt to contact patient regarding: SDOH  Community Health Worker to attempt to contact patient on: 01/23/2023

## 2023-01-25 ENCOUNTER — OFFICE VISIT (OUTPATIENT)
Dept: CARDIOLOGY | Facility: CLINIC | Age: 43
End: 2023-01-25
Payer: COMMERCIAL

## 2023-01-25 VITALS
HEIGHT: 74 IN | WEIGHT: 280.19 LBS | HEART RATE: 58 BPM | BODY MASS INDEX: 35.96 KG/M2 | DIASTOLIC BLOOD PRESSURE: 80 MMHG | OXYGEN SATURATION: 98 % | SYSTOLIC BLOOD PRESSURE: 132 MMHG

## 2023-01-25 DIAGNOSIS — I50.20 HFREF (HEART FAILURE WITH REDUCED EJECTION FRACTION): ICD-10-CM

## 2023-01-25 DIAGNOSIS — I13.0 HYPERTENSIVE HEART AND RENAL DISEASE WITH CONGESTIVE HEART FAILURE: ICD-10-CM

## 2023-01-25 DIAGNOSIS — N18.4 STAGE 4 CHRONIC KIDNEY DISEASE: Primary | ICD-10-CM

## 2023-01-25 PROCEDURE — 3066F PR DOCUMENTATION OF TREATMENT FOR NEPHROPATHY: ICD-10-PCS | Mod: CPTII,NTX,S$GLB, | Performed by: SPECIALIST

## 2023-01-25 PROCEDURE — 1159F MED LIST DOCD IN RCRD: CPT | Mod: CPTII,NTX,S$GLB, | Performed by: SPECIALIST

## 2023-01-25 PROCEDURE — 99999 PR PBB SHADOW E&M-EST. PATIENT-LVL IV: ICD-10-PCS | Mod: PBBFAC,TXP,, | Performed by: SPECIALIST

## 2023-01-25 PROCEDURE — 3075F PR MOST RECENT SYSTOLIC BLOOD PRESS GE 130-139MM HG: ICD-10-PCS | Mod: CPTII,NTX,S$GLB, | Performed by: SPECIALIST

## 2023-01-25 PROCEDURE — 3079F PR MOST RECENT DIASTOLIC BLOOD PRESSURE 80-89 MM HG: ICD-10-PCS | Mod: CPTII,NTX,S$GLB, | Performed by: SPECIALIST

## 2023-01-25 PROCEDURE — 99215 PR OFFICE/OUTPT VISIT, EST, LEVL V, 40-54 MIN: ICD-10-PCS | Mod: NTX,S$GLB,, | Performed by: SPECIALIST

## 2023-01-25 PROCEDURE — 4010F ACE/ARB THERAPY RXD/TAKEN: CPT | Mod: CPTII,NTX,S$GLB, | Performed by: SPECIALIST

## 2023-01-25 PROCEDURE — 3075F SYST BP GE 130 - 139MM HG: CPT | Mod: CPTII,NTX,S$GLB, | Performed by: SPECIALIST

## 2023-01-25 PROCEDURE — 3008F BODY MASS INDEX DOCD: CPT | Mod: CPTII,NTX,S$GLB, | Performed by: SPECIALIST

## 2023-01-25 PROCEDURE — 1160F PR REVIEW ALL MEDS BY PRESCRIBER/CLIN PHARMACIST DOCUMENTED: ICD-10-PCS | Mod: CPTII,NTX,S$GLB, | Performed by: SPECIALIST

## 2023-01-25 PROCEDURE — 99215 OFFICE O/P EST HI 40 MIN: CPT | Mod: NTX,S$GLB,, | Performed by: SPECIALIST

## 2023-01-25 PROCEDURE — 3008F PR BODY MASS INDEX (BMI) DOCUMENTED: ICD-10-PCS | Mod: CPTII,NTX,S$GLB, | Performed by: SPECIALIST

## 2023-01-25 PROCEDURE — 3079F DIAST BP 80-89 MM HG: CPT | Mod: CPTII,NTX,S$GLB, | Performed by: SPECIALIST

## 2023-01-25 PROCEDURE — 3066F NEPHROPATHY DOC TX: CPT | Mod: CPTII,NTX,S$GLB, | Performed by: SPECIALIST

## 2023-01-25 PROCEDURE — 1159F PR MEDICATION LIST DOCUMENTED IN MEDICAL RECORD: ICD-10-PCS | Mod: CPTII,NTX,S$GLB, | Performed by: SPECIALIST

## 2023-01-25 PROCEDURE — 99999 PR PBB SHADOW E&M-EST. PATIENT-LVL IV: CPT | Mod: PBBFAC,TXP,, | Performed by: SPECIALIST

## 2023-01-25 PROCEDURE — 1160F RVW MEDS BY RX/DR IN RCRD: CPT | Mod: CPTII,NTX,S$GLB, | Performed by: SPECIALIST

## 2023-01-25 PROCEDURE — 4010F PR ACE/ARB THEARPY RXD/TAKEN: ICD-10-PCS | Mod: CPTII,NTX,S$GLB, | Performed by: SPECIALIST

## 2023-01-25 NOTE — PROGRESS NOTES
Subjective:    Patient ID:  João Ham is a 42 y.o. male who presents for   Congestive Heart Failure and Hypertension    HPItired not sleeping    Not sob    Before meds sob bp was hi   bp now 117-140/70-80 bp control    For ofh pre screen for transplant    10/22 renal   -  hypertensive injury     No sob or cp    Basically patient had longstanding hypertension not adequately treated and developed heart failure and chronic kidney disease and renal biopsy demonstrates nephrosclerosis secondary to longstanding hypertension and patient is currently awaiting evaluation by renal transplant service   Blood pressure is under control and heart failure is under control    Review of patient's allergies indicates:   Allergen Reactions    Mushroom Swelling     Tongue swells        Past Medical History:   Diagnosis Date    CHF (congestive heart failure)     High blood pressure with chronic kidney disease, stage 5 chronic kidney disease or end stage renal disease     Hypertension      History reviewed. No pertinent surgical history.  Social History     Tobacco Use    Smoking status: Never     Passive exposure: Never    Smokeless tobacco: Never   Substance Use Topics    Alcohol use: Never    Drug use: Never        Review of Systems     Review of Systems   Constitutional: Positive for malaise/fatigue and weight gain. Negative for decreased appetite and weight loss.        On  lo na diet      HENT: Negative.  Negative for congestion, hearing loss and tinnitus.    Eyes:  Negative for blurred vision, vision loss in left eye, vision loss in right eye, visual disturbance and visual halos.   Cardiovascular:  Negative for chest pain, claudication, dyspnea on exertion, irregular heartbeat, leg swelling, near-syncope, orthopnea, palpitations, paroxysmal nocturnal dyspnea and syncope.   Respiratory:  Negative for cough, hemoptysis, shortness of breath, sleep disturbances due to breathing, snoring, sputum production and wheezing.     Endocrine: Negative for polydipsia, polyphagia and polyuria.   Hematologic/Lymphatic: Negative for adenopathy. Does not bruise/bleed easily.   Skin:  Negative for dry skin, itching, poor wound healing, rash, skin cancer and suspicious lesions.   Musculoskeletal:  Negative for arthritis, back pain, falls, gout, joint pain, joint swelling, muscle cramps, muscle weakness, neck pain and stiffness.   Gastrointestinal:  Negative for abdominal pain, change in bowel habit, constipation, diarrhea, heartburn, hematemesis, hemorrhoids, melena, nausea and vomiting.   Genitourinary:  Negative for bladder incontinence, dysuria, flank pain, frequency, hematuria, nocturia and non-menstrual bleeding.   Neurological:  Negative for brief paralysis, difficulty with concentration, disturbances in coordination, dizziness, focal weakness, headaches, loss of balance, numbness, paresthesias and tremors.   Psychiatric/Behavioral:  Negative for altered mental status, depression, hallucinations, memory loss, substance abuse, suicidal ideas and thoughts of violence. The patient does not have insomnia and is not nervous/anxious.    Allergic/Immunologic: Negative for environmental allergies and hives.         Objective:        Vitals:    01/25/23 1614   BP: 132/80   Pulse: (!) 58       Lab Results   Component Value Date    WBC 4.53 01/09/2023    HGB 10.1 (L) 01/09/2023    HCT 31.5 (L) 01/09/2023     01/09/2023    CHOL 141 10/11/2022    TRIG 66 10/11/2022    HDL 39 (L) 10/11/2022    ALT 25 10/18/2022    AST 11 10/18/2022     01/10/2023    K 4.3 01/10/2023     01/10/2023    CREATININE 6.8 (H) 01/10/2023    BUN 67 (H) 01/10/2023    CO2 28 01/10/2023    TSH 1.699 10/10/2022    PSA 0.36 01/10/2023    INR 1.1 10/10/2022    HGBA1C 5.5 10/10/2022        ECHOCARDIOGRAM RESULTS  Results for orders placed during the hospital encounter of 10/10/22    Echo    Interpretation Summary  · The estimated PA systolic pressure is 48 mmHg.  · The  left ventricle is normal in size with mild concentric hypertrophy and normal systolic function.  · Grade III left ventricular diastolic dysfunction.  · Moderate right ventricular enlargement with mildly to moderately reduced right ventricular systolic function.  · Severe left atrial enlargement.  · There is moderate pulmonary hypertension.  · Intermediate central venous pressure (8 mmHg).  · Mild-to-moderate mitral regurgitation.  · Moderate to severe tricuspid regurgitation.  · Moderate right atrial enlargement.  · The estimated ejection fraction is 60%.  · Atrial fibrillation not observed.      CURRENT/PREVIOUS VISIT EKG  Results for orders placed or performed in visit on 11/15/22   IN OFFICE EKG 12-LEAD (to [place])    Collection Time: 11/15/22 12:15 PM    Narrative    Test Reason : I50.31,I51.89,    Vent. Rate : 063 BPM     Atrial Rate : 063 BPM     P-R Int : 140 ms          QRS Dur : 102 ms      QT Int : 492 ms       P-R-T Axes : 075 -64 -60 degrees     QTc Int : 503 ms    Normal sinus rhythm  Possible Left atrial enlargement  Incomplete right bundle branch block  Left anterior fascicular block  Nonspecific T wave abnormality  Prolonged QT  Abnormal ECG  When compared with ECG of 13-OCT-2022 11:32,  Minimal criteria for Septal infarct are no longer Present  T wave inversion no longer evident in Anterior leads  Confirmed by Ady OLIVIA, Dawson NEVILLE ([id number]) on 12/7/2022 4:09:39 PM    Referred By: ROXANN HENDRICKS           Confirmed By:Dawson Garsia MD     Results for orders placed during the hospital encounter of 10/10/22    Echo    Interpretation Summary  · The estimated PA systolic pressure is 48 mmHg.  · The left ventricle is normal in size with mild concentric hypertrophy and normal systolic function.  · Grade III left ventricular diastolic dysfunction.  · Moderate right ventricular enlargement with mildly to moderately reduced right ventricular systolic function.  · Severe left atrial enlargement.  · There is  moderate pulmonary hypertension.  · Intermediate central venous pressure (8 mmHg).  · Mild-to-moderate mitral regurgitation.  · Moderate to severe tricuspid regurgitation.  · Moderate right atrial enlargement.  · The estimated ejection fraction is 60%.  · Atrial fibrillation not observed.    No results found for this or any previous visit.      PHYSICAL EXAM    Physical Exam  overweight male blood pressure is 140/90   Head neck no bruit   Lungs are clear   Cardiac regular   Abdomen obese   Extremities no    Medication List with Changes/Refills   Current Medications    CALCITRIOL (ROCALTROL) 0.25 MCG CAP    Take 1 capsule (0.25 mcg total) by mouth once daily.    CALCIUM CARBONATE (TUMS) 200 MG CALCIUM (500 MG) CHEWABLE TABLET    Take 2 tablets (1,000 mg total) by mouth 3 (three) times daily with meals.    CLONIDINE (CATAPRES) 0.3 MG TABLET    Take 1 tablet (0.3 mg total) by mouth 3 (three) times daily.    FUROSEMIDE (LASIX) 80 MG TABLET    Take 1 tablet (80 mg total) by mouth 3 (three) times daily with meals.    HYDRALAZINE (APRESOLINE) 100 MG TABLET    Take 1 tablet (100 mg total) by mouth 3 (three) times daily.    ISOSORBIDE MONONITRATE (IMDUR) 120 MG 24 HR TABLET    Take 1 tablet (120 mg total) by mouth once daily.    METOPROLOL SUCCINATE (TOPROL-XL) 50 MG 24 HR TABLET    Take 3 tablets (150 mg total) by mouth once daily.    SEVELAMER CARBONATE (RENVELA) 800 MG TAB    Take 2 tablets (1,600 mg total) by mouth 3 (three) times daily with meals.    TRAZODONE (DESYREL) 50 MG TABLET    Take 1 tablet (50 mg total) by mouth nightly as needed for Insomnia.    VALSARTAN (DIOVAN) 80 MG TABLET    Take 2 tablets (160 mg total) by mouth once daily.           Assessment:      Nephrosclerosis chronic kidney disease   Heart failure reduced ejection fraction   Blood pressure heart failure under control  1. Hypertensive heart and renal disease with congestive heart failure    I substantially and  personally reviewed old and new  ecg's, lab reports,, xray reports  and  other cardiovascular studies including  echo's, stress tests, angiogram reports, holters,and vascular studies .  In addition I evaluated original cardiac cath  ___echo  __x__cxr _x_____ct ____scan on [employer] or [employer] or other viewing platforms and  ____EKG's .   I reviewed  office and hospital notes Yes ____ of  referring providers and other providers.  I reviewed personally old hospital and office notes   Time spent evaluating and managing this patient:____35 ____min.        Plan:    Get lab follow-up with renal transplant return her in several months    Problem List Items Addressed This Visit          Cardiac/Vascular    Hypertensive heart and renal disease with congestive heart failure        No follow-ups on file.

## 2023-01-30 ENCOUNTER — PATIENT OUTREACH (OUTPATIENT)
Dept: ADMINISTRATIVE | Facility: OTHER | Age: 43
End: 2023-01-30
Payer: COMMERCIAL

## 2023-01-30 NOTE — PROGRESS NOTES
CHW - Outreach Attempt    Community Health Worker left a voicemail message for 2nd attempt to contact patient regarding: SDOH  Community Health Worker to attempt to contact patient on: 01/30/2023

## 2023-02-02 ENCOUNTER — HOSPITAL ENCOUNTER (OUTPATIENT)
Dept: RADIOLOGY | Facility: HOSPITAL | Age: 43
Discharge: HOME | End: 2023-02-02
Attending: NURSE PRACTITIONER
Payer: COMMERCIAL

## 2023-02-02 ENCOUNTER — OFFICE VISIT (OUTPATIENT)
Dept: TRANSPLANT | Facility: CLINIC | Age: 43
End: 2023-02-02
Payer: COMMERCIAL

## 2023-02-02 VITALS
HEIGHT: 72 IN | TEMPERATURE: 98 F | OXYGEN SATURATION: 95 % | DIASTOLIC BLOOD PRESSURE: 106 MMHG | SYSTOLIC BLOOD PRESSURE: 192 MMHG | RESPIRATION RATE: 16 BRPM | HEART RATE: 79 BPM | BODY MASS INDEX: 37.65 KG/M2 | WEIGHT: 278 LBS

## 2023-02-02 DIAGNOSIS — I13.0 HYPERTENSIVE HEART AND RENAL DISEASE WITH CONGESTIVE HEART FAILURE: ICD-10-CM

## 2023-02-02 DIAGNOSIS — Z76.82 ORGAN TRANSPLANT CANDIDATE: ICD-10-CM

## 2023-02-02 DIAGNOSIS — Z01.818 PRE-TRANSPLANT EVALUATION FOR KIDNEY TRANSPLANT: Primary | ICD-10-CM

## 2023-02-02 DIAGNOSIS — R80.1 PERSISTENT PROTEINURIA: ICD-10-CM

## 2023-02-02 DIAGNOSIS — N25.81 SECONDARY HYPERPARATHYROIDISM: ICD-10-CM

## 2023-02-02 DIAGNOSIS — N18.5 CKD (CHRONIC KIDNEY DISEASE), STAGE V: ICD-10-CM

## 2023-02-02 PROCEDURE — 97802 MEDICAL NUTRITION INDIV IN: CPT | Mod: S$GLB,TXP,,

## 2023-02-02 PROCEDURE — 3077F PR MOST RECENT SYSTOLIC BLOOD PRESSURE >= 140 MM HG: ICD-10-PCS | Mod: CPTII,S$GLB,TXP, | Performed by: NURSE PRACTITIONER

## 2023-02-02 PROCEDURE — 76770 US RETROPERITONEAL COMPLETE: ICD-10-PCS | Mod: 26,TXP,, | Performed by: RADIOLOGY

## 2023-02-02 PROCEDURE — 3066F NEPHROPATHY DOC TX: CPT | Mod: CPTII,S$GLB,TXP, | Performed by: NURSE PRACTITIONER

## 2023-02-02 PROCEDURE — 99205 PR OFFICE/OUTPT VISIT, NEW, LEVL V, 60-74 MIN: ICD-10-PCS | Mod: S$GLB,TXP,, | Performed by: NURSE PRACTITIONER

## 2023-02-02 PROCEDURE — 1160F PR REVIEW ALL MEDS BY PRESCRIBER/CLIN PHARMACIST DOCUMENTED: ICD-10-PCS | Mod: CPTII,S$GLB,TXP, | Performed by: NURSE PRACTITIONER

## 2023-02-02 PROCEDURE — 72170 XR PELVIS ROUTINE AP: ICD-10-PCS | Mod: 26,TXP,, | Performed by: RADIOLOGY

## 2023-02-02 PROCEDURE — 97802 PR MED NUTR THER, 1ST, INDIV, EA 15 MIN: ICD-10-PCS | Mod: S$GLB,TXP,,

## 2023-02-02 PROCEDURE — 99203 OFFICE O/P NEW LOW 30 MIN: CPT | Mod: S$GLB,TXP,, | Performed by: TRANSPLANT SURGERY

## 2023-02-02 PROCEDURE — 72170 X-RAY EXAM OF PELVIS: CPT | Mod: 26,TXP,, | Performed by: RADIOLOGY

## 2023-02-02 PROCEDURE — 99999 PR PBB SHADOW E&M-EST. PATIENT-LVL V: ICD-10-PCS | Mod: PBBFAC,TXP,, | Performed by: NURSE PRACTITIONER

## 2023-02-02 PROCEDURE — 4010F PR ACE/ARB THEARPY RXD/TAKEN: ICD-10-PCS | Mod: CPTII,S$GLB,TXP, | Performed by: NURSE PRACTITIONER

## 2023-02-02 PROCEDURE — 76770 US EXAM ABDO BACK WALL COMP: CPT | Mod: TC,TXP

## 2023-02-02 PROCEDURE — 1159F MED LIST DOCD IN RCRD: CPT | Mod: CPTII,S$GLB,TXP, | Performed by: NURSE PRACTITIONER

## 2023-02-02 PROCEDURE — 3008F PR BODY MASS INDEX (BMI) DOCUMENTED: ICD-10-PCS | Mod: CPTII,S$GLB,TXP, | Performed by: NURSE PRACTITIONER

## 2023-02-02 PROCEDURE — 1160F RVW MEDS BY RX/DR IN RCRD: CPT | Mod: CPTII,S$GLB,TXP, | Performed by: NURSE PRACTITIONER

## 2023-02-02 PROCEDURE — 3077F SYST BP >= 140 MM HG: CPT | Mod: CPTII,S$GLB,TXP, | Performed by: NURSE PRACTITIONER

## 2023-02-02 PROCEDURE — 3080F PR MOST RECENT DIASTOLIC BLOOD PRESSURE >= 90 MM HG: ICD-10-PCS | Mod: CPTII,S$GLB,TXP, | Performed by: NURSE PRACTITIONER

## 2023-02-02 PROCEDURE — 3066F PR DOCUMENTATION OF TREATMENT FOR NEPHROPATHY: ICD-10-PCS | Mod: CPTII,S$GLB,TXP, | Performed by: NURSE PRACTITIONER

## 2023-02-02 PROCEDURE — 4010F ACE/ARB THERAPY RXD/TAKEN: CPT | Mod: CPTII,S$GLB,TXP, | Performed by: NURSE PRACTITIONER

## 2023-02-02 PROCEDURE — 72170 X-RAY EXAM OF PELVIS: CPT | Mod: TC,TXP

## 2023-02-02 PROCEDURE — 71046 XR CHEST PA AND LATERAL: ICD-10-PCS | Mod: 26,TXP,, | Performed by: RADIOLOGY

## 2023-02-02 PROCEDURE — 76770 US EXAM ABDO BACK WALL COMP: CPT | Mod: 26,TXP,, | Performed by: RADIOLOGY

## 2023-02-02 PROCEDURE — 1159F PR MEDICATION LIST DOCUMENTED IN MEDICAL RECORD: ICD-10-PCS | Mod: CPTII,S$GLB,TXP, | Performed by: NURSE PRACTITIONER

## 2023-02-02 PROCEDURE — 99205 OFFICE O/P NEW HI 60 MIN: CPT | Mod: S$GLB,TXP,, | Performed by: NURSE PRACTITIONER

## 2023-02-02 PROCEDURE — 99999 PR PBB SHADOW E&M-EST. PATIENT-LVL V: CPT | Mod: PBBFAC,TXP,, | Performed by: NURSE PRACTITIONER

## 2023-02-02 PROCEDURE — 71046 X-RAY EXAM CHEST 2 VIEWS: CPT | Mod: TC,TXP

## 2023-02-02 PROCEDURE — 3080F DIAST BP >= 90 MM HG: CPT | Mod: CPTII,S$GLB,TXP, | Performed by: NURSE PRACTITIONER

## 2023-02-02 PROCEDURE — 3008F BODY MASS INDEX DOCD: CPT | Mod: CPTII,S$GLB,TXP, | Performed by: NURSE PRACTITIONER

## 2023-02-02 PROCEDURE — 99203 PR OFFICE/OUTPT VISIT, NEW, LEVL III, 30-44 MIN: ICD-10-PCS | Mod: S$GLB,TXP,, | Performed by: TRANSPLANT SURGERY

## 2023-02-02 PROCEDURE — 71046 X-RAY EXAM CHEST 2 VIEWS: CPT | Mod: 26,TXP,, | Performed by: RADIOLOGY

## 2023-02-02 RX ORDER — VIT C/E/ZN/COPPR/LUTEIN/ZEAXAN 250MG-90MG
1 CAPSULE ORAL DAILY
COMMUNITY
Start: 2022-10-18 | End: 2023-06-15

## 2023-02-02 NOTE — PROGRESS NOTES
PHARM.D. PRE-TRANSPLANT NOTE:    This patient's medication therapy was evaluated as part of his pre-transplant evaluation.      The following general pharmacologic concerns were noted: Resume patient's trazodone post transplant to prevent withdrawal    The following concerns for post-operative pain management were noted: N/A    The following pharmacologic concerns related to HCV therapy were noted: N/A      This patient's medication profile was reviewed for considerations for DAA Hepatitis C therapy:    [x]  No current inducers of CYP 3A4 or PGP  [x]  No amiodarone on this patient's EMR profile in the last 24 months  [x]  No past or current atrial fibrillation on this patient's EMR profile       Current Outpatient Medications   Medication Sig Dispense Refill    calcitRIOL (ROCALTROL) 0.25 MCG Cap Take 1 capsule (0.25 mcg total) by mouth once daily. 30 capsule 3    calcium carbonate (TUMS) 200 mg calcium (500 mg) chewable tablet Take 2 tablets (1,000 mg total) by mouth 3 (three) times daily with meals. 180 tablet 11    cholecalciferol, vitamin D3, (VITAMIN D3) 25 mcg (1,000 unit) capsule Take 1 tablet by mouth once daily.      cloNIDine (CATAPRES) 0.3 MG tablet Take 1 tablet (0.3 mg total) by mouth 3 (three) times daily. 270 tablet 1    furosemide (LASIX) 80 MG tablet Take 1 tablet (80 mg total) by mouth 3 (three) times daily with meals. 90 tablet 11    hydrALAZINE (APRESOLINE) 100 MG tablet Take 1 tablet (100 mg total) by mouth 3 (three) times daily. 90 tablet 11    isosorbide mononitrate (IMDUR) 120 MG 24 hr tablet Take 1 tablet (120 mg total) by mouth once daily. 30 tablet 11    metoprolol succinate (TOPROL-XL) 50 MG 24 hr tablet Take 3 tablets (150 mg total) by mouth once daily. 90 tablet 11    sevelamer carbonate (RENVELA) 800 mg Tab Take 2 tablets (1,600 mg total) by mouth 3 (three) times daily with meals. 180 tablet 11    traZODone (DESYREL) 50 MG tablet Take 1 tablet (50 mg total) by mouth nightly as needed  for Insomnia. 90 tablet 1    valsartan (DIOVAN) 80 MG tablet Take 2 tablets (160 mg total) by mouth once daily. 30 tablet 3     No current facility-administered medications for this visit.           I am available for consultation and can be contacted, as needed by the other members of the Transplant team.

## 2023-02-02 NOTE — LETTER
February 6, 2023        Willy Hanna  [address]  [address]  [facility] [address]  Phone: [phone]  Fax: [phone]             [facility] 1st Fl  [address]  [facility] [address]  Phone: [phone]   Patient: João Ham   MR Number: [id number]   YOB: 1980   Date of Visit: 2/2/2023       Dear Dr. Willy Hanna    Thank you for referring João Ham to me for evaluation. Attached you will find relevant portions of my assessment and plan of care.    If you have questions, please do not hesitate to call me. I look forward to following João Ham along with you.    Sincerely,    Chelo Valentino NP    Enclosure    If you would like to receive this communication electronically, please contact [email or web address] or [phone] to request [employer] access.    [employer] is a tool which provides read-only access to select patient information with whom you have a relationship. Its easy to use and provides real time access to review your patients record including encounter summaries, notes, results, and demographic information.    If you feel you have received this communication in error or would no longer like to receive these types of communications, please e-mail [email or web address]

## 2023-02-02 NOTE — PROGRESS NOTES
INITIAL PATIENT EDUCATION NOTE    Mr. João Ham was seen in pre-kidney transplant clinic for evaluation for kidney, kidney/pancreas or pancreas only transplant.  The patient attended a an individual video education session that discussed/reviewed the following aspects of transplantation: evaluation including diagnostic and laboratory testing,( Chemistries, Hematology, Serologies including HIV and Hepatitis and HLA) required for transplantation and selection committee process, [facility]OS waitlist management/multiple listings, types of organs offered (KDPI < 85%, KDPI > 85%, PHS risk, DCD, HCV+, HIV+ for HIV+ recipients and enbloc/dual), financial aspects, surgical procedures, dietary instruction pre- and post-transplant, health maintenance pre- and post-transplant, post-transplant hospitalization and outpatient follow-up, potential to participate in a research protocol, and medication management and side effects.  A question and answer session was provided after the presentation.    The patient was seen by all members of the multi-disciplinary team to include: Nephrologist/FUAD, Surgeon, , Transplant Coordinator, , Pharmacist and Dietician (if applicable).    The patient reviewed and signed all consents for evaluation which were witnessed and sent to scanning into the Psychiatric chart.    The patient was given an education book and plan for further evaluation based on his individual assessment.      Reviewed program requirement for complete COVID vaccination with documentation prior to listing.  COVID education information reviewed with patient. Patient encouraged to be up to date on all vaccinations.       The patient was informed that the transplant team would manage immediate post op pain. If the patient requires long term pain management, they will need to have that pain management addressed by their PCP or previous provider who wrote for long term pain medicines.    The patient was  encouraged to call with any questions or concerns.

## 2023-02-02 NOTE — PROGRESS NOTES
TRANSPLANT NUTRITIONAL ASSESSMENT    Referring Provider: Willy Hanna MD     Reason for Visit: Pre-kidney transplant work-up (pre-dialysis)    Age: 42 y.o.  Sex: male    Patient Active Problem List   Diagnosis    Stage 5 chronic kidney disease not on chronic dialysis    Malignant hypertensive urgency    Anemia    Persistent proteinuria    Obesity (BMI 30-39.9)    Elevated troponin    Acute heart failure with preserved ejection fraction    Severe obesity with body mass index (BMI) of 35.0 to 39.9 with comorbidity    Pulmonary hypertension    Hypertensive heart and renal disease with congestive heart failure    Hyperparathyroidism    Erectile dysfunction     Past Medical History:   Diagnosis Date    Allergy     Anemia     Anxiety     CHF (congestive heart failure)     Depression     High blood pressure with chronic kidney disease, stage 5 chronic kidney disease or end stage renal disease     Hypertension      Lab Results   Component Value Date    GLU 98 02/02/2023    K 3.6 02/02/2023    PHOS 4.8 (H) 02/02/2023    MG 2.0 10/18/2022    CHOL 141 10/11/2022    HDL 39 (L) 10/11/2022    TRIG 66 10/11/2022    ALBUMIN 4.1 02/02/2023    HGBA1C 5.5 10/10/2022    CALCIUM 9.8 02/02/2023     Other Pertinent Labs: N/A    Current Outpatient Medications   Medication Sig    calcitRIOL (ROCALTROL) 0.25 MCG Cap Take 1 capsule (0.25 mcg total) by mouth once daily.    calcium carbonate (TUMS) 200 mg calcium (500 mg) chewable tablet Take 2 tablets (1,000 mg total) by mouth 3 (three) times daily with meals.    cholecalciferol, vitamin D3, (VITAMIN D3) 25 mcg (1,000 unit) capsule Take 1 tablet by mouth once daily.    cloNIDine (CATAPRES) 0.3 MG tablet Take 1 tablet (0.3 mg total) by mouth 3 (three) times daily.    furosemide (LASIX) 80 MG tablet Take 1 tablet (80 mg total) by mouth 3 (three) times daily with meals.    hydrALAZINE (APRESOLINE) 100 MG tablet Take 1 tablet (100 mg total) by mouth 3 (three) times daily.    isosorbide  "mononitrate (IMDUR) 120 MG 24 hr tablet Take 1 tablet (120 mg total) by mouth once daily.    metoprolol succinate (TOPROL-XL) 50 MG 24 hr tablet Take 3 tablets (150 mg total) by mouth once daily.    sevelamer carbonate (RENVELA) 800 mg Tab Take 2 tablets (1,600 mg total) by mouth 3 (three) times daily with meals.    traZODone (DESYREL) 50 MG tablet Take 1 tablet (50 mg total) by mouth nightly as needed for Insomnia.    valsartan (DIOVAN) 80 MG tablet Take 2 tablets (160 mg total) by mouth once daily.     No current facility-administered medications for this visit.     Allergies: Mushroom    Ht Readings from Last 1 Encounters:   02/02/23 5' 11.58" (1.818 m)     Wt Readings from Last 1 Encounters:   02/02/23 126.1 kg (278 lb)      BMI: Body mass index is 38.15 kg/m².    Usual Weight: 268-275 lb  Weight Change/Time: stable   Current Diet: low sodium   Appetite/Current Intake: good   Exercise/Physical Activity: functional in ADLs; plans on going back to the gym   Nutritional/Herbal Supplements: vitamin D   Chewing/Swallowing Problems: none  Symptoms: none    Estimated Kcal Need: 7074-1321 kcal/day (20 kg/kg -500 kcals for wt loss)   Estimated Protein Need:  gm/day (0.6-0.8 gm/kg)     Nutritional History:   Pt and caregiver present. Pt reports hospitalization last October 2/2 renal issues. Pt has since be following a renal/low sodium diet.     Diet Recall    Morning: cereal (fruit loops, frosted flakes, cinnamon toast crunch), almond milk     Midday: 2 sandwiches (honey wheat, turkey, wells, cheese)     Evening: salmon or chicken, vegetable (green beans, corn, broccoli), side salad (lettuce, cucumber, turkey, cheese)     Snacks/Dessert: pineapple, grapes, pastries     Beverages: 80 oz water (fluid restriction per MD), crangrape or cranapple juice, 1-2 sprites/wk, 1 coke/wk       Seasonings: low sodium seasonings     Restaurants/Fast Food: 2-3x/wk     Nutritional Diagnoses  Problem: food- and nutrition-related " knowledge deficit  Etiology: need for re-enforcement of previous education on pre-kidney transplant nutrition recommendations  Symptoms: diet recall and questions from pt    Educational Need? yes  Barriers: none identified  Discussed with: patient and caregiver  Interventions: Patient taught nutrition information regarding Low phosphorus diet education and Pre-kidney transplant work-up (pre-dialysis). Renal Nutrition Therapy packet reviewed (high/low food sources of K, Phos and protein, low sodium and fluid intake, emphasis on moderate protein intake). Encouraged physical activity daily, regular exercise as tolerated, stay mobile.  Goals/Recommendations: diet adherence  Actions Taken: instruct/provide written information  Patient and/or family comprehend instructions: yes  Outcome: Verbalizes understanding  Monitoring: Contact information provided, will f/u in clinic and communicate with the care team as needed.     Counseling Time: 20 minutes

## 2023-02-02 NOTE — PROGRESS NOTES
Transplant Surgery  Kidney Transplant Recipient Evaluation    Referring Physician: Willy Hanna  Current Nephrologist: Willy Hanna    Subjective:     Reason for Visit: evaluate transplant candidacy    History of Present Illness: João Ham is a 42 y.o. year old male undergoing transplant evaluation.    Dialysis History: João is pre-dialysis.      Transplant History: N/A    Etiology of Renal Disease: Malignant Hypertension (based on medical records from referral).    External provider notes reviewed: Yes    Review of Systems  Objective:     Physical Exam:  Constitutional:   Vitals reviewed: yes   Well-nourished and well-groomed: yes  Eyes:   Sclerae icteric: no   Extraocular movements intact: yes  GI:    Bowel sounds normal: yes   Tenderness: no    If yes, quadrant/location: not applicable   Palpable masses: no    If yes, quadrant/location: not applicable   Hepatosplenomegaly: no   Ascites: no   Hernia: no    If yes, type/location: not applicable   Surgical scars: no    If yes, type/location: not applicable  Resp:   Effort normal: yes   Breath sounds normal: yes    CV:   Regular rate and rhythm: yes   Heart sounds normal: yes   Femoral pulses normal: yes   Extremities edematous: no  Skin:   Rashes or lesions present: no    If yes, describe:not applicable   Jaundice:: no    Musculoskeletal:   Gait normal: yes   Strength normal: yes  Psych:   Oriented to person, place, and time: yes   Affect and mood normal: yes    Additional comments: not applicable    Diagnostics:  The following labs have been reviewed: NA  The following radiology images have been independently reviewed and interpreted: NA    Counseling: We provided João Ham with a group education session today.  We discussed kidney transplantation at length with him, including risks, potential complications, and alternatives in the management of his renal failure.  The discussion included complications related to anesthesia, bleeding, infection,  primary nonfunction, and ATN.  I discussed the typical postoperative course, length of hospitalization, the need for long-term immunosuppression, and the need for long-term routine follow-up.  I discussed living-donor and -donor transplantation and the relative advantages and disadvantages of each.  I also discussed average waiting times for both living donation and  donation.  I discussed national and center-specific survival rates.  I also mentioned the potential benefit of multicenter listing to candidates listed with centers within more than one organ procurement organization.  All questions were answered.    Patient advised that it is recommended that all transplant candidates, and their close contacts and household members receive Covid vaccination.    Final determination of transplant candidacy will be made once evaluation is complete and reviewed by the Kidney & Kidney/Pancreas Selection Committee.    Coronavirus disease (COVID-19) caused by severe acute respiratory virus coronavirus 2 (SARS-C0V 2) is associated with increased mortality in solid organ transplant recipients (SOT) compared to non-transplant patients. Vaccine responses to vaccination are depressed against SARS-CoV2 compared to normal individuals but improve with third vaccination doses. Vaccination prior to SOT provides both the best opportunity for transplant candidates to develop protective immunity and to reduce the risk of serious COVID19 infections post transplantation. Organ transplant candidates at [facility] will be required to receive SARS-CoV-2 vaccination prior to being listed with a an active status, whenever possible. Exceptions will be made for disability related reasons or for sincerely held [employer] beliefs.          Transplant Surgery - Candidacy   Assessment/Plan:   João Hma is pre-dialysis with CKD stage 4 (GFR 15-29 mL/min). I see no surgical contraindication to  placing a kidney transplant. Based on available information, João Ham is a suitable kidney transplant candidate.     Additional testing to be completed according to the Written Order Guidelines for Adult Pre-kidney and Pancreas Transplant Evaluation (KI-02).  Interpretation of tests and discussion of patient management involves all members of the multidisciplinary transplant team.    Serge Tan MD

## 2023-02-02 NOTE — PROGRESS NOTES
Transplant Nephrology  Kidney Transplant Recipient Evaluation    Referring Physician: Willy Hanna  Current Nephrologist: Willy Hanna    Subjective:   CC:  Initial evaluation of kidney transplant candidacy.    HPI:  Mr. Ham is a 42 y.o. year old Black or  male who has presented to be evaluated as a potential kidney transplant recipient.  He has advanced kidney disease secondary to HTN (biopsy proven 10/2022).  Patient is currently pre-dialysis, does not have a dialysis access.    Hospitalized 10/2022 after presenting with generalized edema and SOB. Was found to have GORGE and acute heart failure with diastolic dysfunction. Culprit longstanding undiagnosed HTN. Had renal biopsy 10/17/2022 which showed MODERATE ARTERIONEPHROSCLEROSIS. Has now been following with nephrology.    Recently stopped working due to medical issues, was previously a supervisor at [employer]. Remains active. Looks good, not frail.    Grandfather interested in donation.    Denies MI, CVA, DVT/PE, or malignancy history.    Current Outpatient Medications   Medication Sig Dispense Refill    calcitRIOL (ROCALTROL) 0.25 MCG Cap Take 1 capsule (0.25 mcg total) by mouth once daily. 30 capsule 3    calcium carbonate (TUMS) 200 mg calcium (500 mg) chewable tablet Take 2 tablets (1,000 mg total) by mouth 3 (three) times daily with meals. 180 tablet 11    cholecalciferol, vitamin D3, (VITAMIN D3) 25 mcg (1,000 unit) capsule Take 1 tablet by mouth once daily.      cloNIDine (CATAPRES) 0.3 MG tablet Take 1 tablet (0.3 mg total) by mouth 3 (three) times daily. 270 tablet 1    furosemide (LASIX) 80 MG tablet Take 1 tablet (80 mg total) by mouth 3 (three) times daily with meals. 90 tablet 11    hydrALAZINE (APRESOLINE) 100 MG tablet Take 1 tablet (100 mg total) by mouth 3 (three) times daily. 90 tablet 11    isosorbide mononitrate (IMDUR) 120 MG 24 hr tablet Take 1 tablet (120 mg total) by mouth once daily. 30 tablet 11     metoprolol succinate (TOPROL-XL) 50 MG 24 hr tablet Take 3 tablets (150 mg total) by mouth once daily. 90 tablet 11    sevelamer carbonate (RENVELA) 800 mg Tab Take 2 tablets (1,600 mg total) by mouth 3 (three) times daily with meals. 180 tablet 11    traZODone (DESYREL) 50 MG tablet Take 1 tablet (50 mg total) by mouth nightly as needed for Insomnia. 90 tablet 1    valsartan (DIOVAN) 80 MG tablet Take 2 tablets (160 mg total) by mouth once daily. 30 tablet 3     No current facility-administered medications for this visit.       Past Medical History:   Diagnosis Date    Allergy     Anemia     Anxiety     CHF (congestive heart failure)     Depression     High blood pressure with chronic kidney disease, stage 5 chronic kidney disease or end stage renal disease     Hypertension      Past Medical and Surgical History: Mr. Ham  has a past medical history of Allergy, Anemia, Anxiety, CHF (congestive heart failure), Depression, High blood pressure with chronic kidney disease, stage 5 chronic kidney disease or end stage renal disease, and Hypertension.  He has a past surgical history that includes Hernia repair (Right).    Past Social and Family History: Mr. Ham reports that he has never smoked. He has never been exposed to tobacco smoke. He has never used smokeless tobacco. He reports that he does not drink alcohol and does not use drugs. His family history is not on file.    Review of Systems   Constitutional:  Positive for fatigue. Negative for activity change, appetite change and fever.   HENT:  Negative for congestion, mouth sores and sore throat.    Eyes:  Negative for visual disturbance.   Respiratory:  Negative for cough, chest tightness and shortness of breath.    Cardiovascular:  Negative for chest pain, palpitations and leg swelling.   Gastrointestinal:  Negative for abdominal distention, abdominal pain, constipation, diarrhea and nausea.   Genitourinary:  Negative for difficulty urinating, frequency and  "hematuria.        +ED   Musculoskeletal:  Negative for arthralgias and gait problem.   Skin:  Negative for wound.   Allergic/Immunologic: Negative for environmental allergies, food allergies and immunocompromised state.   Neurological:  Negative for dizziness, weakness and numbness.   Psychiatric/Behavioral:  Negative for sleep disturbance. The patient is not nervous/anxious.      Objective:   Blood pressure (!) 192/106, pulse 79, temperature 97.7 °F (36.5 °C), temperature source Tympanic, resp. rate 16, height 5' 11.58" (1.818 m), weight 126.1 kg (278 lb), SpO2 95 %.body mass index is 38.15 kg/m².    Physical Exam  Vitals and nursing note reviewed.   Constitutional:       Appearance: Normal appearance.   HENT:      Head: Normocephalic.   Cardiovascular:      Rate and Rhythm: Normal rate and regular rhythm.      Heart sounds: Normal heart sounds.   Pulmonary:      Effort: Pulmonary effort is normal.      Breath sounds: Normal breath sounds.   Abdominal:      General: Bowel sounds are normal. There is no distension.      Palpations: Abdomen is soft.      Tenderness: There is no abdominal tenderness.   Musculoskeletal:         General: Normal range of motion.   Skin:     General: Skin is warm and dry.   Neurological:      General: No focal deficit present.      Mental Status: He is alert.   Psychiatric:         Behavior: Behavior normal.       Labs:  Lab Results   Component Value Date    WBC 4.97 02/02/2023    HGB 11.1 (L) 02/02/2023    HCT 35.1 (L) 02/02/2023     02/02/2023    K 3.6 02/02/2023    CL 98 02/02/2023    CO2 24 02/02/2023    BUN 76 (H) 02/02/2023    CREATININE 8.4 (H) 02/02/2023    CALCIUM 9.8 02/02/2023    PHOS 4.8 (H) 02/02/2023    MG 2.0 10/18/2022    ALBUMIN 4.1 02/02/2023    AST 9 (L) 02/02/2023    ALT 11 02/02/2023    UTPCR 1.10 (H) 01/09/2023    .6 (H) 02/02/2023       Lab Results   Component Value Date    BILIRUBINUA Negative 01/09/2023    PROTEINUA 1+ (A) 01/09/2023    NITRITE " Negative 01/09/2023    RBCUA 0 01/09/2023    WBCUA 0 01/09/2023       Labs were reviewed with the patient.    Assessment:     1. Pre-transplant evaluation for kidney transplant    2. Hypertensive heart and renal disease with congestive heart failure    3. CKD (chronic kidney disease), stage V    4. Persistent proteinuria    5. Secondary hyperparathyroidism    6. BMI 38.0-38.9,adult      Plan:   42 y.o. year old  male who has presented to be evaluated as a potential kidney transplant recipient.  He has advanced kidney disease secondary to HTN (biopsy proven 10/2022).  Will need updated cardiac testing.    Transplant Candidacy:   Based on available information, Mr. Ham is a suitable kidney transplant candidate.   Meets center eligibility for accepting HCV+ donor offer - yes.  Patient educated on HCV+ donors. João is willing to accept HCV+ donor offer - yes   Patient is a candidate for KDPI > 85 kidney donor offer - no (weight > 88 kg)  Final determination of transplant candidacy will be made once workup is complete and reviewed by the selection committee.    Patient advised that it is recommended that all transplant candidates, and their close contacts and household members receive Covid vaccination.    Chelo Valentino NP       OS Patient Status  Functional Status: 90% - Able to carry on normal activity: minor symptoms of disease  Physical Capacity: No Limitations

## 2023-02-02 NOTE — PROGRESS NOTES
Transplant Recipient Adult Psychosocial Assessment    João Ham  [address]  [facility] [address]  Telephone Information:   Mobile [phone]   Home  [phone] (home)  Work  There is no work phone number on file.  E-mail  [email or web address]    Sex: male  YOB: 1980  Age: 42 y.o.    Encounter Date: 2023  U.[place] Citizen: yes  Primary Language: English   Needed: no    Emergency Contact:  Angelica Guidry, grandmother, EBNTLEY, does drive/own car, retired nurse. [phone]  Matthew , 79 yo grandfather, BENTLEY, does drive/own car, retired downing. [phone]    Family/Social Support:   Number of dependents/: Pt denies  Marital history:  x 1  Other family dynamics: Pt reports supportive family living nearby. Pt reports nephew Demetrius Gabriel lives with patient in [facility], is supportive and will assist with transplant as needed. Pt reports supportive daughter Sol Ham living in [place] attending [employer] nursing school. Pt reports supportive grand parents Matthew and angelica Guidry live in [facility] and will be able to assist with transplant as needed. Pt reports mother is , father and siblings live away and most likely will not be able to assist with transplant due to their own responsibilities.    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 [phone]    Do you and your caregivers have access to reliable transportation? yes  PRIMARY CAREGIVER: Matthew Guidry, grandfather, ([phone]) and Angelica Guidry, grandmother ([phone]) will be primary caregivers.    Pt brought nephew Demetrius for transplant evaluation however Demetrius does not drive  and lives in [facility]. Pt reports grand parents are retired, do drive/have car, live in [facility] and will be pt's primary transplant caregivers. Transplant SW will confirm patient's transplant caregiver/transportation plan once patient has time to discuss same with grand parents. Transplant SW confirmed with patient's nephew Demetrius who states will be able to assist with transplant if he gets his 's license in place and can arrange for vehicle.     provided in-depth information to patient and caregiver regarding pre- and post-transplant caregiver role.   strongly encourages patient and caregiver to have concrete plan regarding post-transplant care giving, including back-up caregiver(s) to ensure care giving needs are met as needed.    Patient and Caregiver states understanding all aspects of caregiver role/commitment and is able/willing/committed to being caregiver to the fullest extent necessary.    Patient and Caregiver verbalizes understanding of the education provided today and caregiver responsibilities.         remains available. Patient and Caregiver agree to contact  in a timely manner if concerns arise.      Able to take time off work without financial concerns: yes.     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 [phone]  Sol Ham, 19 yo daughter, [facility], does drive/own car, nursing student at [employer]. [phone]    Living Will: pt denies  Healthcare Power of : pt denies  Advance Directives on file: <<no information> per medical record. Sol Ham, daughter: [phone]  Verbally  reviewed LW/HCPA information.   provided patient with copy of LW/HCPA documents and provided education on completion of forms.    Living Donors: Education and resource information given to patient.    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 Pt reports currently on STD but also reports problems with accessing STD income due to complications with [employer] organization processing the forms from 3rd party STD payor. Pt reports has engaged with an agent that is assisting in straightening out these problems. Pt to contact transplant SW to update regarding any issues concerning STD payments.    Spouse/Significant Other Employment: Pt reports is not . Is .    Disabled: Pt reports is not on dialysis and not on Medicare disability. Pt reports is on STD from job at [employer] (see above). Pt reports has Medicare disability appointment in March 2023 due to CHF and acute kidney problems.    Monthly Income:  STD 60% of $4460 monthly salary   Able to afford all costs now and if transplanted, including medications: Pt reports having some complications receiving STD pay due to [employer] organization not processing STD documentation through the 3rd party STD payor and pt reports has missed 3 STD pay checks because of this issue. Pt reports now has an company agent that is helping him with getting this issue straightened out.    Patient and Caregiver verbalizes understanding of personal responsibilities related to transplant costs and the importance of having a financial plan to ensure that patients transplant costs are fully covered.       provided fundraising information/education. Patient and Caregiververbalizes understanding.   remains  available.    Insurance:   Payer/Plan Subscr  Sex Relation Sub. Ins. ID Effective Group Num   1. BLUE CROSS BL* KENTON ELENA 1980 Male Self [id number]                                   [address] [address]     Primary Insurance (for UNOS reporting): Private Insurance through employer [employer]  Secondary Insurance (for UNOS reporting): pt denies. Pt reports has SS appointments 2023 due to CHF and kidney problems.  Patient and Caregiver verbalizes clear understanding that patient may experience difficulty obtaining and/or be denied insurance coverage post-surgery. This includes and is not limited to disability insurance, life insurance, health insurance, burial insurance, long term care insurance, and other insurances.      Patient and Caregiver also reports understanding that future health concerns related to or unrelated to transplantation may not be covered by patient's insurance.  Resources and information provided and reviewed.     Patient and Caregiver provides verbal permission to release any necessary information to outside resources for patient care and discharge planning.  Resources and information provided are reviewed.      Dialysis Adherence: Patient and Caregiver reports pt is not on dialysis.      Infusion Service: patient utilizing? no  [facility]: [facility]   DME: [facility]pc  Pulmonary/Cardiac Rehab: pt denies  ADLS:  pt reports having problems climbing stairs. Pt reports does self care, does own medication management; does drive/own car    Adherence:   Pt reports is highly compliant with all medical appointments and medical instructions within last 3 months. Pt reports had to obtain new doctors recently because former doctors were not in network for new BCBS plan.  Adherence education and counseling provided.     Per History Section:  Past Medical History:   Diagnosis Date    Allergy     Anemia     Anxiety     CHF (congestive heart  failure)     Depression     High blood pressure with chronic kidney disease, stage 5 chronic kidney disease or end stage renal disease     Hypertension      Social History     Tobacco Use    Smoking status: Never     Passive exposure: Never    Smokeless tobacco: Never   Substance Use Topics    Alcohol use: Never     Social History     Substance and Sexual Activity   Drug Use Never     Social History     Substance and Sexual Activity   Sexual Activity Not Currently       Per Today's Psychosocial:  Tobacco: none, patient denies any use.  Alcohol: none, patient denies any use.  Illicit Drugs/Non-prescribed Medications: none, patient denies any use.    Patient and Caregiver states clear understanding of the potential impact of substance use as it relates to transplant candidacy and is aware of possible random substance screening.  Substance abstinence/cessation counseling, education and resources provided and reviewed.     Arrests/DWI/Treatment/Rehab: patient denies    Psychiatric History:    Mental Health: Pt reports has experienced feelings of sadness, depression and anxiety due to problems coping with STD non payments and how that has impacted his ability to feel confident he will be able to afford expenses. Pt reports now having help through an agent to assist with these STD non payments and hopes it will all be resolved soon. Pt reports has ongoing grief with his mother's death. 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 Pt reports is not Spiritism but is spiritual and this helps him cope too. Pt denies need for mental health referral at this time.  Psychiatrist/Counselor: pt denies  Medications:  trazodone 50 mg for sleep; prescribed by PCP  Suicide/Homicide Issues: pt denies any si/hi history  Safety at home: Pt reports living in safe environment with no abuse.    Knowledge:  Patient and Caregiver states having clear understanding and realistic expectations regarding the potential risks and potential benefits of organ transplantation and organ donation and agrees to discuss with health care team members and support system members, as well as to utilize available resources and express questions and/or concerns in order to further facilitate the pt informed decision-making.  Resources and information provided and reviewed.    Patient and Caregiver is aware of [facility]'s affiliation and/or partnership with agencies in home health care, LTAC, SNF, [facility], and other hospitals and clinics.    Understanding: Patient and Caregiver reports having a clear understanding of the many lifetime commitments involved with being a transplant recipient, including costs, compliance, medications, lab work, procedures, appointments, concrete and financial planning, preparedness, timely and appropriate communication of concerns, abstinence (ETOH, tobacco, illicit non-prescribed drugs), adherence to all health care team recommendations, support system and caregiver involvement, appropriate and timely resource utilization and follow-through, mental health counseling as needed/recommended, and patient and caregiver responsibilities.  Social Service Handbook, resources and detailed educational information provided and reviewed.  Educational information provided.    Patient and Caregiver also reports current and expected compliance with health care regime and states having a clear understanding of the importance of compliance.      Patient and Caregiver reports a clear understanding that risks and benefits may be involved with organ transplantation and with organ donation.       Patient and Caregiver also reports clear understanding that psychosocial risk factors may affect patient, and include but are not limited to feelings of depression, generalized anxiety, anxiety regarding dependence on others, post traumatic  stress disorder, feelings of guilt and other emotional and/or mental concerns, and/or exacerbation of existing mental health concerns.  Detailed resources provided and discussed.      Patient and Caregiver agrees to access appropriate resources in a timely manner as needed and/or as recommended, and to communicate concerns appropriately.  Patient and Caregiver also reports a clear understanding of treatment options available.     Patient and Caregiver received education in a group setting.   reviewed education, provided additional information, and answered questions.    Feelings or Concerns: Pt reports high motivation to pursue kidney organ transplant at this time.    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 Pt repots is not Roman Catholic but is spiritual and this helps him cope too.   1. 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 Pt repots is not Roman Catholic but is spiritual and this helps him cope too.   2. At the time of surgery - through family support; composing music and writing poetry. Pt repots is not Roman Catholic but is spiritual and this helps him cope too.   3. During post-Transplant & Recovery Period - through family support; composing music and writing poetry. Pt repots is not Roman Catholic but is spiritual and this helps him cope too.    Goals: Pt reports hope for successful kidney organ transplant so he may not be on dialysis and can return to work once medically cleared.  Patient referred to Vocational Rehabilitation.    Interview Behavior: Patient and Caregiver presents as alert and oriented x 4, pleasant, good eye contact, well groomed, recall good, concentration/judgement good, average intelligence,  calm, communicative, cooperative, and asking and answering questions appropriately. Pt's supportive nephew Demetriusmanav Gabriel in session with patient's permission.         Transplant Social Work - Candidacy  Assessment/Plan:     Psychosocial Suitability: Patient presents as low to medium risk candidate for kidney transplant at this time. Based on psychosocial risk factors, patient presents low to medium risk due to patient reporting financial difficulties due to inconsistent STD payments and due to confirmation needed regarding patient's primary transplant caregiver/transportation plan. Pt reports currently on STD, has had organizational problems with pt's [employer] employer and third party payor consistently paying patient STD paychecks; patient reports now has agent who is assisting him in resolving these STD paycheck complications. Pt reports highly supportive grandparents live in [facility] and will be primary transplant caregivers. Grandparents not at pt's initial psychosocial and transplant SW will confirm patient's transplant caregiver/transportation plan by phone with patient's grandparents.      Recommendations/Additional Comments: Pt reports lives in [facility] and will stay with grandparents in [facility] for post transplant lodging. Pt reports will ask BCBS if there are transplant benefits for travel, meals and lodging. Pt reports currently on STD income and has had organizational problems with pt's [employer] employer and third party payor consistently paying patient STD paychecks; patient reports now has agent who is assisting him in resolving these complications. Transplant SW will contact patient for follow up to find out if STD issue has been resolved. Pt reports grandparents will be primary transplant caregivers but they were not present in pt's initial psychosocial. Pt's transplant caregiver/transportation plan will be confirmed with grandparents by transplant SW by telephone.    SW recommends that pt  conduct fundraising to assist pt with pay for cost of medications, food, gas, and other transplant related needs.  SW recommends that pt remain aware of potential mental health concerns and contact the team if any concerns arise.  SW recommends that pt remain abstinent from tobacco, ETOH, and drug use.  SW supports pt's continued adherence. SW remains available to answer any questions or concerns that arise as the pt moves through the transplant process.      Final determination of transplant candidacy will be reviewed by the selection committee.      Daphnie PARISHW

## 2023-02-03 ENCOUNTER — TELEPHONE (OUTPATIENT)
Dept: NEPHROLOGY | Facility: CLINIC | Age: 43
End: 2023-02-03
Payer: COMMERCIAL

## 2023-02-03 NOTE — TELEPHONE ENCOUNTER
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     I explained Dr Hanna doesn't normally handle disability and we might require assistance from his PCP.        WORK STATUS sheet is what they needs.     Also needs clinic notes.     The lady it needs to go to is [phone]    Isabelle Montgomery fax number.

## 2023-02-03 NOTE — TELEPHONE ENCOUNTER
----- Message from Meaghan Trevizo sent at 2/3/2023 10:57 AM CST -----  Contact: [phone]  Type: Needs Medical Advice  Who Called:  Pt     Best Call Back Number: [phone] (home)     Additional Information: Pt calling because disability was not received by [employer].  Pls fax to:    Fax [phone] Att: Isabelle Montgomery They need work status sheet and office notes starting Jan 17th.  Pls call back and advise

## 2023-02-06 NOTE — TELEPHONE ENCOUNTER
I attempted to call [employer] at [phone] to speak with Isabelle Montgomery to find out exactly what is needed. Was trasnferred and placed on hold greater than 10 minutes 2 different times.     Dr Hanna is deferring work function to PCP, since he doesn't evaluate for that and the primary care generally manages disability.    The patient is also wanted to extend his leave after initial period ending     Patient informed and will forward the message to Dr Granado's staff.

## 2023-02-07 ENCOUNTER — PATIENT OUTREACH (OUTPATIENT)
Dept: ADMINISTRATIVE | Facility: OTHER | Age: 43
End: 2023-02-07
Payer: COMMERCIAL

## 2023-02-07 NOTE — PROGRESS NOTES
CHW - Follow Up    This Community Health Worker completed a follow up visit with patient via telephone today.  Pt/Caregiver reported: patient stated that he did not received anything that the chw sent out   Community Health Worker provided: chw put in a referral in through [facility], and 211 referral  Follow up required:   Follow-up Outreach - Due: 3/10/2023

## 2023-02-08 ENCOUNTER — PATIENT MESSAGE (OUTPATIENT)
Dept: FAMILY MEDICINE | Facility: CLINIC | Age: 43
End: 2023-02-08
Payer: COMMERCIAL

## 2023-02-08 ENCOUNTER — TELEPHONE (OUTPATIENT)
Dept: FAMILY MEDICINE | Facility: CLINIC | Age: 43
End: 2023-02-08
Payer: COMMERCIAL

## 2023-02-08 NOTE — TELEPHONE ENCOUNTER
----- Message from Marlin Jade sent at 2/8/2023 11:56 AM CST -----  Regarding: pt called  Type:  Patient Returning Call        Who Called:  Pt called      Who Left Message for Patient:    Shilpi cuenca     Does the patient know what this is regarding?  The pt has disability paperwork he needs filled out and faxed.    he says its two diff emails and two diff departments it needs to be sent to   Best Call Back Number: [phone] (home)           Additional Information:

## 2023-02-08 NOTE — TELEPHONE ENCOUNTER
Called patient he, wants paperwork fill out to extend short term disability, explained patient I do not have any paperwork to fill out, and call the place always is a wait over 10 minutes before they disconnect the call , he said he has a copy and will bring  it to fill out

## 2023-02-09 ENCOUNTER — TELEPHONE (OUTPATIENT)
Dept: TRANSPLANT | Facility: CLINIC | Age: 43
End: 2023-02-09
Payer: COMMERCIAL

## 2023-02-09 DIAGNOSIS — Z76.82 ORGAN TRANSPLANT CANDIDATE: Primary | ICD-10-CM

## 2023-02-09 NOTE — TELEPHONE ENCOUNTER
Notified the patient he will need ID consult for + strongyloides. Reviewed upcoming appts with the patient. The patient voiced understanding and all questions were answered.

## 2023-02-10 ENCOUNTER — TELEPHONE (OUTPATIENT)
Dept: NEPHROLOGY | Facility: CLINIC | Age: 43
End: 2023-02-10

## 2023-02-10 DIAGNOSIS — I10 HYPERTENSION, UNSPECIFIED TYPE: ICD-10-CM

## 2023-02-10 RX ORDER — VALSARTAN 80 MG/1
160 TABLET ORAL 2 TIMES DAILY
Qty: 30 TABLET | Refills: 3 | Status: SHIPPED | OUTPATIENT
Start: 2023-02-10 | End: 2023-02-22 | Stop reason: SDUPTHER

## 2023-02-10 NOTE — TELEPHONE ENCOUNTER
Spoke to Sara and informed of new RX and lab.     SPoke to patient who wants to add 1 tablet of valsartan at night instead of 2, because he had trouble adjust last time.     Called back Sara after speaking with Dr Hanna.  Cancelled lab draw for Monday with [facility].  Will send another fax cancelling lab for Monday.   Added renal panel to transplant lab Next Thursday, as Dr Hanna wanted it approximately one week after change.     I called back patient to discuss.  He is anxious about adding more medication, and wondered if his readings today are situational.   He said he checks his pressure at home.  Advised patient to avoid too high or too low situations and let us know on Monday what he did and how it's working for him.

## 2023-02-10 NOTE — TELEPHONE ENCOUNTER
----- Message from Dyana Martínez sent at 2/10/2023 10:56 AM CST -----  Regarding: pt call back  Who Called:[facility] home [facility] nurse         What is the reqeust in detail: Caller said pt blood pressure is elevated on left arm 164/102 and right arm  158/98 and is asymptomatic. Please advise         Can the clinic reply by [facility]?no          Best Call Back Number:[phone] Sara          Additional Information:

## 2023-02-14 ENCOUNTER — TELEPHONE (OUTPATIENT)
Dept: CARDIOLOGY | Facility: HOSPITAL | Age: 43
End: 2023-02-14
Payer: COMMERCIAL

## 2023-02-14 ENCOUNTER — LAB VISIT (OUTPATIENT)
Dept: LAB | Facility: HOSPITAL | Age: 43
End: 2023-02-14
Attending: INTERNAL MEDICINE
Payer: COMMERCIAL

## 2023-02-14 DIAGNOSIS — I27.20 PULMONARY HYPERTENSION: ICD-10-CM

## 2023-02-14 DIAGNOSIS — I16.0 MALIGNANT HYPERTENSIVE URGENCY: ICD-10-CM

## 2023-02-14 DIAGNOSIS — I10 HYPERTENSION, UNSPECIFIED TYPE: ICD-10-CM

## 2023-02-14 LAB
ALBUMIN SERPL BCP-MCNC: 4 G/DL (ref 3.5–5.2)
ANION GAP SERPL CALC-SCNC: 12 MMOL/L (ref 8–16)
BUN SERPL-MCNC: 82 MG/DL (ref 6–20)
CALCIUM SERPL-MCNC: 9.9 MG/DL (ref 8.7–10.5)
CHLORIDE SERPL-SCNC: 99 MMOL/L (ref 95–110)
CO2 SERPL-SCNC: 27 MMOL/L (ref 23–29)
CREAT SERPL-MCNC: 7.9 MG/DL (ref 0.5–1.4)
EST. GFR  (NO RACE VARIABLE): 8.1 ML/MIN/1.73 M^2
GLUCOSE SERPL-MCNC: 94 MG/DL (ref 70–110)
PHOSPHATE SERPL-MCNC: 4.7 MG/DL (ref 2.7–4.5)
POTASSIUM SERPL-SCNC: 4.2 MMOL/L (ref 3.5–5.1)
SODIUM SERPL-SCNC: 138 MMOL/L (ref 136–145)

## 2023-02-14 PROCEDURE — 36415 COLL VENOUS BLD VENIPUNCTURE: CPT | Mod: PO,NTX | Performed by: INTERNAL MEDICINE

## 2023-02-14 PROCEDURE — 80069 RENAL FUNCTION PANEL: CPT | Mod: TXP | Performed by: INTERNAL MEDICINE

## 2023-02-16 ENCOUNTER — HOSPITAL ENCOUNTER (OUTPATIENT)
Dept: CARDIOLOGY | Facility: HOSPITAL | Age: 43
Discharge: HOME | End: 2023-02-16
Attending: NURSE PRACTITIONER
Payer: COMMERCIAL

## 2023-02-16 ENCOUNTER — TELEPHONE (OUTPATIENT)
Dept: TRANSPLANT | Facility: CLINIC | Age: 43
End: 2023-02-16
Payer: COMMERCIAL

## 2023-02-16 ENCOUNTER — PATIENT MESSAGE (OUTPATIENT)
Dept: FAMILY MEDICINE | Facility: CLINIC | Age: 43
End: 2023-02-16
Payer: COMMERCIAL

## 2023-02-16 VITALS
HEART RATE: 70 BPM | SYSTOLIC BLOOD PRESSURE: 156 MMHG | BODY MASS INDEX: 38.92 KG/M2 | HEIGHT: 71 IN | RESPIRATION RATE: 16 BRPM | WEIGHT: 278 LBS | DIASTOLIC BLOOD PRESSURE: 101 MMHG

## 2023-02-16 DIAGNOSIS — Z01.810 HIGH RISK SURGERY, PRE-OPERATIVE CARDIOVASCULAR EXAMINATION: ICD-10-CM

## 2023-02-16 DIAGNOSIS — Z76.82 ORGAN TRANSPLANT CANDIDATE: ICD-10-CM

## 2023-02-16 DIAGNOSIS — Z91.89 CARDIOVASCULAR EVENT RISK: ICD-10-CM

## 2023-02-16 LAB
CV STRESS BASE HR: 57 BPM
DIASTOLIC BLOOD PRESSURE: 117 MMHG
EJECTION FRACTION- HIGH: 59 %
END DIASTOLIC INDEX-HIGH: 155 ML/M2
END DIASTOLIC INDEX-LOW: 91 ML/M2
END SYSTOLIC INDEX-HIGH: 78 ML/M2
END SYSTOLIC INDEX-LOW: 40 ML/M2
OHS CV CPX 1 MINUTE RECOVERY HEART RATE: 83 BPM
OHS CV CPX 85 PERCENT MAX PREDICTED HEART RATE MALE: 152
OHS CV CPX MAX PREDICTED HEART RATE: 179
OHS CV CPX PATIENT IS FEMALE: 0
OHS CV CPX PATIENT IS MALE: 1
OHS CV CPX PEAK DIASTOLIC BLOOD PRESSURE: 111 MMHG
OHS CV CPX PEAK HEAR RATE: 59 BPM
OHS CV CPX PEAK RATE PRESSURE PRODUCT: NORMAL
OHS CV CPX PEAK SYSTOLIC BLOOD PRESSURE: 177 MMHG
OHS CV CPX PERCENT MAX PREDICTED HEART RATE ACHIEVED: 33
OHS CV CPX RATE PRESSURE PRODUCT PRESENTING: NORMAL
RETIRED EF AND QEF - SEE NOTES: 47 %
SYSTOLIC BLOOD PRESSURE: 182 MMHG

## 2023-02-16 PROCEDURE — 78452 NUCLEAR STRESS - CARDIOLOGY INTERPRETED (CUPID ONLY): ICD-10-PCS | Mod: 26,TXP,, | Performed by: INTERNAL MEDICINE

## 2023-02-16 PROCEDURE — 93018 NUCLEAR STRESS - CARDIOLOGY INTERPRETED (CUPID ONLY): ICD-10-PCS | Mod: TXP,,, | Performed by: INTERNAL MEDICINE

## 2023-02-16 PROCEDURE — 93018 CV STRESS TEST I&R ONLY: CPT | Mod: TXP,,, | Performed by: INTERNAL MEDICINE

## 2023-02-16 PROCEDURE — 63600175 PHARM REV CODE 636 W HCPCS: Mod: TXP | Performed by: NURSE PRACTITIONER

## 2023-02-16 PROCEDURE — 93016 NUCLEAR STRESS - CARDIOLOGY INTERPRETED (CUPID ONLY): ICD-10-PCS | Mod: TXP,,, | Performed by: INTERNAL MEDICINE

## 2023-02-16 PROCEDURE — 93017 CV STRESS TEST TRACING ONLY: CPT | Mod: TXP

## 2023-02-16 PROCEDURE — 78452 HT MUSCLE IMAGE SPECT MULT: CPT | Mod: 26,TXP,, | Performed by: INTERNAL MEDICINE

## 2023-02-16 PROCEDURE — A9502 TC99M TETROFOSMIN: HCPCS | Mod: TXP

## 2023-02-16 PROCEDURE — 93016 CV STRESS TEST SUPVJ ONLY: CPT | Mod: TXP,,, | Performed by: INTERNAL MEDICINE

## 2023-02-16 RX ORDER — CAFFEINE CITRATE 20 MG/ML
60 SOLUTION INTRAVENOUS ONCE
Status: COMPLETED | OUTPATIENT
Start: 2023-02-16 | End: 2023-02-16

## 2023-02-16 RX ORDER — REGADENOSON 0.08 MG/ML
0.4 INJECTION, SOLUTION INTRAVENOUS ONCE
Status: COMPLETED | OUTPATIENT
Start: 2023-02-16 | End: 2023-02-16

## 2023-02-16 RX ADMIN — CAFFEINE CITRATE 60 MG: 20 SOLUTION INTRAVENOUS at 08:02

## 2023-02-16 RX ADMIN — REGADENOSON 0.4 MG: 0.08 INJECTION, SOLUTION INTRAVENOUS at 08:02

## 2023-02-20 ENCOUNTER — PATIENT MESSAGE (OUTPATIENT)
Dept: NEPHROLOGY | Facility: CLINIC | Age: 43
End: 2023-02-20
Payer: COMMERCIAL

## 2023-02-22 ENCOUNTER — OFFICE VISIT (OUTPATIENT)
Dept: NEPHROLOGY | Facility: CLINIC | Age: 43
End: 2023-02-22
Payer: COMMERCIAL

## 2023-02-22 VITALS
SYSTOLIC BLOOD PRESSURE: 160 MMHG | HEIGHT: 71 IN | BODY MASS INDEX: 38.92 KG/M2 | DIASTOLIC BLOOD PRESSURE: 100 MMHG | HEART RATE: 71 BPM | OXYGEN SATURATION: 97 % | WEIGHT: 278 LBS

## 2023-02-22 DIAGNOSIS — I10 HYPERTENSION, UNSPECIFIED TYPE: ICD-10-CM

## 2023-02-22 DIAGNOSIS — E21.3 HYPERPARATHYROIDISM: ICD-10-CM

## 2023-02-22 DIAGNOSIS — I13.0 HYPERTENSIVE HEART AND RENAL DISEASE WITH CONGESTIVE HEART FAILURE: ICD-10-CM

## 2023-02-22 DIAGNOSIS — D63.1 ANEMIA DUE TO STAGE 5 CHRONIC KIDNEY DISEASE, NOT ON CHRONIC DIALYSIS: ICD-10-CM

## 2023-02-22 DIAGNOSIS — N18.5 ANEMIA DUE TO STAGE 5 CHRONIC KIDNEY DISEASE, NOT ON CHRONIC DIALYSIS: ICD-10-CM

## 2023-02-22 DIAGNOSIS — N18.5 STAGE 5 CHRONIC KIDNEY DISEASE NOT ON CHRONIC DIALYSIS: Primary | ICD-10-CM

## 2023-02-22 DIAGNOSIS — E66.01 SEVERE OBESITY WITH BODY MASS INDEX (BMI) OF 35.0 TO 39.9 WITH COMORBIDITY: ICD-10-CM

## 2023-02-22 DIAGNOSIS — I16.0 MALIGNANT HYPERTENSIVE URGENCY: ICD-10-CM

## 2023-02-22 DIAGNOSIS — R80.1 PERSISTENT PROTEINURIA: ICD-10-CM

## 2023-02-22 DIAGNOSIS — I27.20 PULMONARY HYPERTENSION: ICD-10-CM

## 2023-02-22 PROCEDURE — 99999 PR PBB SHADOW E&M-EST. PATIENT-LVL III: CPT | Mod: PBBFAC,,, | Performed by: INTERNAL MEDICINE

## 2023-02-22 PROCEDURE — 3080F DIAST BP >= 90 MM HG: CPT | Mod: CPTII,S$GLB,, | Performed by: INTERNAL MEDICINE

## 2023-02-22 PROCEDURE — 99214 PR OFFICE/OUTPT VISIT, EST, LEVL IV, 30-39 MIN: ICD-10-PCS | Mod: S$GLB,,, | Performed by: INTERNAL MEDICINE

## 2023-02-22 PROCEDURE — 99214 OFFICE O/P EST MOD 30 MIN: CPT | Mod: S$GLB,,, | Performed by: INTERNAL MEDICINE

## 2023-02-22 PROCEDURE — 1160F PR REVIEW ALL MEDS BY PRESCRIBER/CLIN PHARMACIST DOCUMENTED: ICD-10-PCS | Mod: CPTII,S$GLB,, | Performed by: INTERNAL MEDICINE

## 2023-02-22 PROCEDURE — 3008F BODY MASS INDEX DOCD: CPT | Mod: CPTII,S$GLB,, | Performed by: INTERNAL MEDICINE

## 2023-02-22 PROCEDURE — 4010F PR ACE/ARB THEARPY RXD/TAKEN: ICD-10-PCS | Mod: CPTII,S$GLB,, | Performed by: INTERNAL MEDICINE

## 2023-02-22 PROCEDURE — 4010F ACE/ARB THERAPY RXD/TAKEN: CPT | Mod: CPTII,S$GLB,, | Performed by: INTERNAL MEDICINE

## 2023-02-22 PROCEDURE — 3066F PR DOCUMENTATION OF TREATMENT FOR NEPHROPATHY: ICD-10-PCS | Mod: CPTII,S$GLB,, | Performed by: INTERNAL MEDICINE

## 2023-02-22 PROCEDURE — 3077F SYST BP >= 140 MM HG: CPT | Mod: CPTII,S$GLB,, | Performed by: INTERNAL MEDICINE

## 2023-02-22 PROCEDURE — 1160F RVW MEDS BY RX/DR IN RCRD: CPT | Mod: CPTII,S$GLB,, | Performed by: INTERNAL MEDICINE

## 2023-02-22 PROCEDURE — 3066F NEPHROPATHY DOC TX: CPT | Mod: CPTII,S$GLB,, | Performed by: INTERNAL MEDICINE

## 2023-02-22 PROCEDURE — 99999 PR PBB SHADOW E&M-EST. PATIENT-LVL III: ICD-10-PCS | Mod: PBBFAC,,, | Performed by: INTERNAL MEDICINE

## 2023-02-22 PROCEDURE — 1159F PR MEDICATION LIST DOCUMENTED IN MEDICAL RECORD: ICD-10-PCS | Mod: CPTII,S$GLB,, | Performed by: INTERNAL MEDICINE

## 2023-02-22 PROCEDURE — 3080F PR MOST RECENT DIASTOLIC BLOOD PRESSURE >= 90 MM HG: ICD-10-PCS | Mod: CPTII,S$GLB,, | Performed by: INTERNAL MEDICINE

## 2023-02-22 PROCEDURE — 1159F MED LIST DOCD IN RCRD: CPT | Mod: CPTII,S$GLB,, | Performed by: INTERNAL MEDICINE

## 2023-02-22 PROCEDURE — 3008F PR BODY MASS INDEX (BMI) DOCUMENTED: ICD-10-PCS | Mod: CPTII,S$GLB,, | Performed by: INTERNAL MEDICINE

## 2023-02-22 PROCEDURE — 3077F PR MOST RECENT SYSTOLIC BLOOD PRESSURE >= 140 MM HG: ICD-10-PCS | Mod: CPTII,S$GLB,, | Performed by: INTERNAL MEDICINE

## 2023-02-22 RX ORDER — VALSARTAN 80 MG/1
160 TABLET ORAL 2 TIMES DAILY
Qty: 30 TABLET | Refills: 3 | Status: SHIPPED | OUTPATIENT
Start: 2023-02-22 | End: 2023-03-01

## 2023-02-22 NOTE — PROGRESS NOTES
"Subjective:       Patient ID: João Ham is a 42 y.o. Black or  male who presents for follow up on CKD.     Since last seen at nephrology clinic, João Ham denies any new hospitalizations or new medications.  No uremic symptoms, not volume overloaded.    Reports taking their medications on time, without missed doses. As to their chronic conditions:  - CKD: Denies use of NSAIDs.   2022: baseline sr cr of 7 - 7.5 mg/dL with UPCR 1.1  - HTN: uncontrolled, follows low salt diet. Denies uncontrolled HTN, dizziness/lightheadedness or hypotension/syncopal episodes.  - dHF, echo in 2022: Left ventricle is normal in size with mild concentric hypertrophy and normal systolic function. Grade III left ventricular  diastolic dysfunction. Moderate right ventricular enlargement with mildly to moderately reduced right ventricular systolic function. Severe left atrial enlargement. There is moderate pulmonary hypertension.     Review of Systems   Constitutional:  Negative for appetite change, chills and fever.   HENT:  Negative for congestion.    Eyes:  Negative for visual disturbance.   Respiratory:  Negative for cough and shortness of breath.    Cardiovascular:  Negative for chest pain and leg swelling.   Gastrointestinal:  Negative for abdominal pain, diarrhea, nausea and vomiting.   Genitourinary:  Negative for difficulty urinating, dysuria and hematuria.   Musculoskeletal:  Negative for myalgias.   Skin:  Negative for rash.   Neurological:  Negative for headaches.   Psychiatric/Behavioral:  Negative for sleep disturbance.      The past medical, family and social histories were reviewed for this encounter.     BP (!) 160/100 (BP Location: Right arm, Patient Position: Sitting, BP Method: Large (Manual))   Pulse 71   Ht 5' 11" (1.803 m)   Wt 126.1 kg (278 lb)   SpO2 97%   BMI 38.77 kg/m²     Objective:      Physical Exam  Vitals reviewed.   Constitutional:       General: He is not in acute distress.     " Appearance: Normal appearance. He is well-developed. He is obese.   HENT:      Head: Normocephalic and atraumatic.      Mouth/Throat:      Mouth: Mucous membranes are moist.      Pharynx: Oropharynx is clear.   Eyes:      General: No scleral icterus.     Extraocular Movements: Extraocular movements intact.      Conjunctiva/sclera: Conjunctivae normal.   Neck:      Vascular: No JVD.   Cardiovascular:      Rate and Rhythm: Normal rate and regular rhythm.      Heart sounds: Normal heart sounds. No murmur heard.    No friction rub. No gallop.   Pulmonary:      Effort: Pulmonary effort is normal. No respiratory distress.      Breath sounds: Normal breath sounds. No wheezing or rales.   Abdominal:      General: Bowel sounds are normal. There is no distension.      Palpations: Abdomen is soft.      Tenderness: There is no abdominal tenderness.   Musculoskeletal:         General: Normal range of motion.      Cervical back: Normal range of motion.      Right lower leg: No edema.      Left lower leg: No edema.   Skin:     General: Skin is warm and dry.      Capillary Refill: Capillary refill takes less than 2 seconds.      Findings: No rash.   Neurological:      General: No focal deficit present.      Mental Status: He is alert and oriented to person, place, and time.   Psychiatric:         Mood and Affect: Mood normal.         Behavior: Behavior normal.       Assessment:       1. Stage 5 chronic kidney disease not on chronic dialysis    2. Hypertension, unspecified type    3. Pulmonary hypertension    4. Malignant hypertensive urgency    5. Hypertensive heart and renal disease with congestive heart failure    6. Persistent proteinuria    7. Anemia due to stage 5 chronic kidney disease, not on chronic dialysis    8. Severe obesity with body mass index (BMI) of 35.0 to 39.9 with comorbidity    9. 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  Baseline creatinine is 7 - 7.5 mg/dL and sub-nephrotic proteinuria  Lab Results   Component Value Date    CREATININE 8.7 (H) 02/16/2023      - Euvolemic   - Complete avoidance of NSAIDs   - Low salt diet with < 2000 mg/day   - Dose adjust medications to GFR of < 15   - Avoid nephrotoxins including IV contrast   - Does not want to start RRT   - Kidney smart class    HTN   - BP uncontrolled: avoid hypotension and dehydration   - Pt had not taken his meds today    Proteinuria due to arterionephrosclerosis    - On Valsartan    Anemia   - Stable     SHPT  Lab Results   Component Value Date    .6 (H) 02/02/2023    CALCIUM 9.9 02/16/2023    CAION 1.10 10/18/2022    PHOS 4.4 02/16/2023    - Cont Vit D and phos binders    pHTN and HF   - On lasix    Obesity    - Diet and exercise    Med changes: none

## 2023-02-23 ENCOUNTER — TELEPHONE (OUTPATIENT)
Dept: NEPHROLOGY | Facility: CLINIC | Age: 43
End: 2023-02-23

## 2023-02-23 NOTE — TELEPHONE ENCOUNTER
[facility]  nurse, Sara  Called to report /92 about 30 minutes after medication.     He said it has been in the 130-160 before the medicine kicks in.     He will send us his noon readings via [employer] or I will call him to check after lunch if we do not receive.

## 2023-02-23 NOTE — TELEPHONE ENCOUNTER
After HH nurse left it was 140/90.      He sounded like he was resting when I called.  He said he took second dose and will check again.  I will call him before I go home.

## 2023-02-23 NOTE — TELEPHONE ENCOUNTER
BP this afternoon is 139/90.  PUlse 78.    Will send to Dr Hanna to note and give further direction if needed.

## 2023-03-01 ENCOUNTER — TELEPHONE (OUTPATIENT)
Dept: FAMILY MEDICINE | Facility: CLINIC | Age: 43
End: 2023-03-01
Payer: COMMERCIAL

## 2023-03-01 ENCOUNTER — TELEPHONE (OUTPATIENT)
Dept: FAMILY MEDICINE | Facility: CLINIC | Age: 43
End: 2023-03-01

## 2023-03-01 ENCOUNTER — OFFICE VISIT (OUTPATIENT)
Dept: FAMILY MEDICINE | Facility: CLINIC | Age: 43
End: 2023-03-01
Payer: COMMERCIAL

## 2023-03-01 VITALS
RESPIRATION RATE: 16 BRPM | OXYGEN SATURATION: 98 % | HEART RATE: 62 BPM | TEMPERATURE: 98 F | WEIGHT: 284.38 LBS | HEIGHT: 71 IN | BODY MASS INDEX: 39.81 KG/M2 | DIASTOLIC BLOOD PRESSURE: 102 MMHG | SYSTOLIC BLOOD PRESSURE: 156 MMHG

## 2023-03-01 DIAGNOSIS — N18.5 STAGE 5 CHRONIC KIDNEY DISEASE NOT ON CHRONIC DIALYSIS: ICD-10-CM

## 2023-03-01 DIAGNOSIS — E21.3 HYPERPARATHYROIDISM: ICD-10-CM

## 2023-03-01 DIAGNOSIS — E66.01 SEVERE OBESITY WITH BODY MASS INDEX (BMI) OF 35.0 TO 39.9 WITH COMORBIDITY: ICD-10-CM

## 2023-03-01 DIAGNOSIS — I27.20 PULMONARY HYPERTENSION: ICD-10-CM

## 2023-03-01 DIAGNOSIS — R53.83 FATIGUE, UNSPECIFIED TYPE: ICD-10-CM

## 2023-03-01 DIAGNOSIS — Z91.89 AT RISK FOR OBSTRUCTIVE SLEEP APNEA: ICD-10-CM

## 2023-03-01 DIAGNOSIS — I13.0 HYPERTENSIVE HEART AND RENAL DISEASE WITH CONGESTIVE HEART FAILURE: ICD-10-CM

## 2023-03-01 DIAGNOSIS — Z00.00 HEALTHCARE MAINTENANCE: Primary | ICD-10-CM

## 2023-03-01 PROCEDURE — 3080F DIAST BP >= 90 MM HG: CPT | Mod: CPTII,S$GLB,, | Performed by: STUDENT IN AN ORGANIZED HEALTH CARE EDUCATION/TRAINING PROGRAM

## 2023-03-01 PROCEDURE — 1159F PR MEDICATION LIST DOCUMENTED IN MEDICAL RECORD: ICD-10-PCS | Mod: CPTII,S$GLB,, | Performed by: STUDENT IN AN ORGANIZED HEALTH CARE EDUCATION/TRAINING PROGRAM

## 2023-03-01 PROCEDURE — 1159F MED LIST DOCD IN RCRD: CPT | Mod: CPTII,S$GLB,, | Performed by: STUDENT IN AN ORGANIZED HEALTH CARE EDUCATION/TRAINING PROGRAM

## 2023-03-01 PROCEDURE — 3066F PR DOCUMENTATION OF TREATMENT FOR NEPHROPATHY: ICD-10-PCS | Mod: CPTII,S$GLB,, | Performed by: STUDENT IN AN ORGANIZED HEALTH CARE EDUCATION/TRAINING PROGRAM

## 2023-03-01 PROCEDURE — 4010F ACE/ARB THERAPY RXD/TAKEN: CPT | Mod: CPTII,S$GLB,, | Performed by: STUDENT IN AN ORGANIZED HEALTH CARE EDUCATION/TRAINING PROGRAM

## 2023-03-01 PROCEDURE — 4010F PR ACE/ARB THEARPY RXD/TAKEN: ICD-10-PCS | Mod: CPTII,S$GLB,, | Performed by: STUDENT IN AN ORGANIZED HEALTH CARE EDUCATION/TRAINING PROGRAM

## 2023-03-01 PROCEDURE — 99999 PR PBB SHADOW E&M-EST. PATIENT-LVL V: ICD-10-PCS | Mod: PBBFAC,,, | Performed by: STUDENT IN AN ORGANIZED HEALTH CARE EDUCATION/TRAINING PROGRAM

## 2023-03-01 PROCEDURE — 3077F PR MOST RECENT SYSTOLIC BLOOD PRESSURE >= 140 MM HG: ICD-10-PCS | Mod: CPTII,S$GLB,, | Performed by: STUDENT IN AN ORGANIZED HEALTH CARE EDUCATION/TRAINING PROGRAM

## 2023-03-01 PROCEDURE — 3077F SYST BP >= 140 MM HG: CPT | Mod: CPTII,S$GLB,, | Performed by: STUDENT IN AN ORGANIZED HEALTH CARE EDUCATION/TRAINING PROGRAM

## 2023-03-01 PROCEDURE — 3066F NEPHROPATHY DOC TX: CPT | Mod: CPTII,S$GLB,, | Performed by: STUDENT IN AN ORGANIZED HEALTH CARE EDUCATION/TRAINING PROGRAM

## 2023-03-01 PROCEDURE — 99214 PR OFFICE/OUTPT VISIT, EST, LEVL IV, 30-39 MIN: ICD-10-PCS | Mod: S$GLB,,, | Performed by: STUDENT IN AN ORGANIZED HEALTH CARE EDUCATION/TRAINING PROGRAM

## 2023-03-01 PROCEDURE — 3008F BODY MASS INDEX DOCD: CPT | Mod: CPTII,S$GLB,, | Performed by: STUDENT IN AN ORGANIZED HEALTH CARE EDUCATION/TRAINING PROGRAM

## 2023-03-01 PROCEDURE — 3008F PR BODY MASS INDEX (BMI) DOCUMENTED: ICD-10-PCS | Mod: CPTII,S$GLB,, | Performed by: STUDENT IN AN ORGANIZED HEALTH CARE EDUCATION/TRAINING PROGRAM

## 2023-03-01 PROCEDURE — 99214 OFFICE O/P EST MOD 30 MIN: CPT | Mod: S$GLB,,, | Performed by: STUDENT IN AN ORGANIZED HEALTH CARE EDUCATION/TRAINING PROGRAM

## 2023-03-01 PROCEDURE — 3080F PR MOST RECENT DIASTOLIC BLOOD PRESSURE >= 90 MM HG: ICD-10-PCS | Mod: CPTII,S$GLB,, | Performed by: STUDENT IN AN ORGANIZED HEALTH CARE EDUCATION/TRAINING PROGRAM

## 2023-03-01 PROCEDURE — 99999 PR PBB SHADOW E&M-EST. PATIENT-LVL V: CPT | Mod: PBBFAC,,, | Performed by: STUDENT IN AN ORGANIZED HEALTH CARE EDUCATION/TRAINING PROGRAM

## 2023-03-01 RX ORDER — AMLODIPINE BESYLATE 5 MG/1
5 TABLET ORAL DAILY
Qty: 30 TABLET | Refills: 6 | Status: SHIPPED | OUTPATIENT
Start: 2023-03-01 | End: 2023-03-22

## 2023-03-01 RX ORDER — OLMESARTAN MEDOXOMIL 40 MG/1
40 TABLET ORAL DAILY
Qty: 90 TABLET | Refills: 1 | Status: ON HOLD | OUTPATIENT
Start: 2023-03-01 | End: 2023-06-28 | Stop reason: HOSPADM

## 2023-03-01 NOTE — TELEPHONE ENCOUNTER
Received a call from  nurse.  nurse was reporting pt bp was 168/104. Informed  nurse that pt has an appt today at 1pm.  nurse verbalized understanding.

## 2023-03-01 NOTE — TELEPHONE ENCOUNTER
----- Message from Gallito Sanchez sent at 3/1/2023 10:22 AM CST -----  Contact: Sara from [facility] at [phone]  Type: Needs Medical Advice  Who Called:  Sara Urbina Call Back Number: [phone]  Additional Information: Sara from [facility] is calling to report the pt's BP is 168/104. Please call back and advise.

## 2023-03-01 NOTE — PROGRESS NOTES
[facility] Note    Subjective:    The HPI and pertinent ROS is included in the Diagnostic Impression Remarks section at the end of the note. Please see below for further details. Chief complaint is at end of note.     KENTON is a pleasant intelligent patient who is here for evaluation.     Modified Medications    No medications on file       Data reviewed 274}  Previous medical records reviewed and summarized in plan section at end of note.      If you are due for any health screening(s) below please notify me so we can arrange them to be ordered and scheduled. Most healthy patients at your age complete them, but you are free to accept or refuse. If you can't do it, I'll definitely understand. If you can, I'd certainly appreciate it!     Tests to Keep You Healthy    Last Blood Pressure <= 139/89 (3/1/2023): NO      The following portions of the patient's history were reviewed and updated as appropriate: allergies, current medications, past family history, past medical history, past social history, past surgical history and problem list.    He  has a past medical history of Allergy, Anemia, Anxiety, CHF (congestive heart failure), Depression, High blood pressure with chronic kidney disease, stage 5 chronic kidney disease or end stage renal disease, and Hypertension.  He  has a past surgical history that includes Hernia repair (Right).    He  reports that he has never smoked. He has never been exposed to tobacco smoke. He has never used smokeless tobacco. He reports that he does not drink alcohol and does not use drugs.  He family history is not on file.    Review of patient's allergies indicates:   Allergen Reactions    Mushroom Swelling     Tongue swells        Tobacco Use: Low Risk     Smoking Tobacco Use: Never    Smokeless Tobacco Use: Never    Passive Exposure: Never     Physical Examination  General appearance: alert, cooperative, no distress  Neck: no thyromegaly, no neck stiffness  Lungs:  "clear to auscultation, no wheezes, rales or rhonchi, symmetric air entry  Heart: normal rate, regular rhythm, normal S1, S2, no murmurs, rubs, clicks or gallops  Abdomen: soft, nontender, nondistended, no rigidity, rebound, or guarding.   Back: no point tenderness over spine  Extremities: peripheral pulses normal, no unilateral leg swelling or calf tenderness   Neurological:alert, oriented, normal speech, no new focal findings or movement disorder noted from baseline    BP Readings from Last 3 Encounters:   03/01/23 (!) 156/102   02/22/23 (!) 160/100   02/16/23 (!) 156/101     Wt Readings from Last 3 Encounters:   03/01/23 129 kg (284 lb 6.3 oz)   02/22/23 126.1 kg (278 lb)   02/16/23 126.1 kg (278 lb)     BP (!) 156/102 (BP Location: Right arm, Patient Position: Sitting, BP Method: Large (Manual))   Pulse 62   Temp 98.4 °F (36.9 °C) (Oral)   Resp 16   Ht 5' 11" (1.803 m)   Wt 129 kg (284 lb 6.3 oz)   SpO2 98%   BMI 39.66 kg/m²    274}  Laboratory: I have reviewed old labs below:    274}    Lab Results   Component Value Date    WBC 4.97 02/02/2023    HGB 11.1 (L) 02/02/2023    HCT 35.1 (L) 02/02/2023    MCV 91 02/02/2023     02/02/2023     02/16/2023    K 4.2 02/16/2023     02/16/2023    CALCIUM 9.9 02/16/2023    PHOS 4.4 02/16/2023    CO2 27 02/16/2023    GLU 92 02/16/2023    BUN 77 (H) 02/16/2023    CREATININE 8.7 (H) 02/16/2023    ANIONGAP 12 02/16/2023    PROT 8.7 (H) 02/02/2023    ALBUMIN 4.0 02/16/2023    BILITOT 0.3 02/02/2023    ALKPHOS 49 (L) 02/02/2023    ALT 11 02/02/2023    AST 9 (L) 02/02/2023    INR 1.0 02/02/2023    CHOL 141 10/11/2022    TRIG 66 10/11/2022    HDL 39 (L) 10/11/2022    LDLCALC 88.8 10/11/2022    TSH 1.699 10/10/2022    PSA 0.36 01/10/2023    HGBA1C 5.5 10/10/2022     Lab reviewed by me: Particular labs of significance that I will monitor, workup, or treat to improve are mentioned below in diagnostic impression remarks.    Imaging/EKG: I have reviewed the " "pertinent results and my findings are noted in remarks.  274}    CC:   Chief Complaint   Patient presents with    Follow-up    Hypertension        274}    Assessment/Plan  João Ham is a 42 y.o. male who presents to clinic with:  1. Healthcare maintenance    2. Hypertensive heart and renal disease with congestive heart failure    3. Pulmonary hypertension    4. Stage 5 chronic kidney disease not on chronic dialysis    5. Severe obesity with body mass index (BMI) of 35.0 to 39.9 with comorbidity       274}  Diagnostic Impression Remarks + HPI     Documentation entered by me for this encounter may have been done in part using speech-recognition technology. Although I have made an effort to ensure accuracy, "sound like" errors may exist and should be interpreted in context.     Hypertensive heart disease with CHF and renal disease-needs improvement add on amlodipine low-dose also will replace valsartan with olmesartan which is slightly stronger and will decrease the pill burden and be easier.  Will increase amlodipine as tolerated continue diuretics no shortness of breath   Will also try to get sleep study this can help with his blood pressure as well  Pulmonary hypertension-needs improvement on blood pressure continue current meds   Hyperparathyroidism-stable monitor lytes vit d levels       The patient's chronic kidney disease stage 3 was monitored, evaluated, addressed and/or treated.    Regarding CKD, the patient was educated on etiology (including infections, diabetes, high blood pressure, kidney stones, circulation problems, and reactions to medications) and the importance on making healthy changes and complying with treatment plan to prevent kidney failure.   Patient was advised to limit sodium intake by:  eating 1,500 mg or less of sodium daily; limiting processed foods (i.e., frozen dinners and packaged meals, canned fish and meats, pickled foods, salted snacks, lunch meats, most cheeses, and fast foods); " "not adding salt to food while cooking or before eating at the table; eating unprocessed foods to lower the sodium intake (i.e., fresh turkey and chicken, lean beef, unsalted tuna, fresh fish, and fresh vegetables and fruits); seasoning foods with fresh herbs, garlic, onions, citrus, flavored vinegar, and sodium-free spice blends instead of salt when cooking;  avoiding drinking "softened" water, because of the sodium content; and avoiding over-the-counter medicines that contain sodium bicarbonate or sodium carbonate.  Patient was also instructed to: avoid becoming overly fatigued; getting plenty of rest and more sleep at night; moving around and bending legs to avoid getting blood clots when resting for long periods of time; taking medications as directed; keeping all medical appointments; taking steps to control high blood pressure and diabetes; and by obtaining a daily weight and keeping a record of those weights for healthcare provider to review.  The patient was instructed to contact primary care provider or seek medical care at the closest emergency department if they experience: difficulty eating or drinking; weight loss of more than 2 pounds in 24 hours or more than 5 pounds in 7 days; little or no urine output; trouble breathing; muscle aches;  fever of 100.4°F or higher, or chills; blood in urine or stool; bloody discharge from the nose, mouth, or ears; severe headache or a seizure; vomiting; swelling of legs or ankles, and/or chest pain.    Severe obesity with comorbidity-Hypertension to which the severe obesity is a contributing factor. This is consistent with the definition of severe obesity with comorbidity. The patient's severe obesity was monitored, evaluated, addressed and/or treated. Diet and exercise recommended see counseling section for further info.     This is the extent of this pleasant patient's concerns at this present time. He did not feel chest pain upon exertion, dyspnea, nausea, vomiting, " diaphoresis, or syncope. No pleuritic chest pain, unilateral leg swelling, calf tenderness, or calf pain. Negative for unintentional weight loss night sweats and fevers. João will return to clinic in a few months for further workup and reassessment or sooner as needed. He was instructed to call the clinic or go to the emergency department or urgent care immediately if his symptoms do not improve, worsens, or if any new symptoms develop. As we discussed that symptoms could worsen over the next 24 hours he was advised that if any increased swelling, pain, or numbness arise to go immediately to the ED. Patient knows to call any time if an emergency arises. Shared decision making occurred and he verbalized understanding in agreement with this plan. I discussed imaging findings, diagnosis, possibilities, treatment options, medications, risks, and benefits. He had many questions regarding the options and long-term effects. All questions were answered. He expressed understanding after counseling regarding the diagnosis and recommendations. He was capable and demonstrated competence with understanding of these options. Shared decision making was performed resulting in him choosing the current treatment plan. Patient handout was given with instructions and recommendations. Advised the patient that if they become pregnant to alert us immediately to assess for medication changes. I also discussed the importance of close follow up to discuss labs, change or modify his medications if needed, monitor side effects, and further evaluation of medical problems.     Additional workup planned: see labs ordered below.    See below for labs and meds ordered with associated diagnosis      1. Healthcare maintenance    2. Hypertensive heart and renal disease with congestive heart failure    3. Pulmonary hypertension    4. Stage 5 chronic kidney disease not on chronic dialysis    5. Severe obesity with body mass index (BMI) of 35.0 to 39.9  with comorbidity      Dawson Granado MD   274}    If you are due for any health screening(s) below please notify me so we can arrange them to be ordered and scheduled. Most healthy patients at your age complete them, but you are free to accept or refuse.     If you can't do it, I'll definitely understand. If you can, I'd certainly appreciate it!   Tests to Keep You Healthy    Last Blood Pressure <= 139/89 (3/1/2023): NO

## 2023-03-01 NOTE — TELEPHONE ENCOUNTER
----- Message from Nara Marie sent at 3/1/2023  4:27 PM CST -----  Regarding: Clarification on sleep study  Dr. Granado had ordered a Polysomnogram Sleep study for this patient back in January that is already authorized.  The schedulers had canceled the order stating that Dr. Cole was going to consult with the patient.  Now Dr. Granado is ordering a Home Sleep Study.  Please ask Dr. Granado if it is ok for the patient to have the Polysomnogram sleep study that is already approved and I will re-instate the order so we can get the patient scheduled.  Please advise.    Thanks  Nara.

## 2023-03-01 NOTE — TELEPHONE ENCOUNTER
Please see message regarding pt sleep study. Is it ok for patient to have the Polysomnogram sleep study that is already approved? Please advise.

## 2023-03-02 NOTE — TELEPHONE ENCOUNTER
Per Dr. Granado:  Is okay for him to have the study that's already approved. He said he did not receive any home sleep study. Pt would like in home sleep study instead of in-hospital sleep study.  I put in the orders since I thought he was lost to follow-up.

## 2023-03-03 ENCOUNTER — OFFICE VISIT (OUTPATIENT)
Dept: INFECTIOUS DISEASES | Facility: CLINIC | Age: 43
End: 2023-03-03
Payer: COMMERCIAL

## 2023-03-03 ENCOUNTER — TELEPHONE (OUTPATIENT)
Dept: INFECTIOUS DISEASES | Facility: CLINIC | Age: 43
End: 2023-03-03

## 2023-03-03 ENCOUNTER — CLINICAL SUPPORT (OUTPATIENT)
Dept: INFECTIOUS DISEASES | Facility: CLINIC | Age: 43
End: 2023-03-03
Payer: COMMERCIAL

## 2023-03-03 ENCOUNTER — LAB VISIT (OUTPATIENT)
Dept: LAB | Facility: HOSPITAL | Age: 43
End: 2023-03-03
Payer: COMMERCIAL

## 2023-03-03 VITALS
WEIGHT: 288.56 LBS | HEIGHT: 71 IN | BODY MASS INDEX: 40.4 KG/M2 | DIASTOLIC BLOOD PRESSURE: 99 MMHG | SYSTOLIC BLOOD PRESSURE: 165 MMHG | HEART RATE: 69 BPM | TEMPERATURE: 98 F

## 2023-03-03 DIAGNOSIS — B89 PARASITIC INFECTION: ICD-10-CM

## 2023-03-03 DIAGNOSIS — Z76.82 ORGAN TRANSPLANT CANDIDATE: ICD-10-CM

## 2023-03-03 DIAGNOSIS — Z71.85 VACCINE COUNSELING: ICD-10-CM

## 2023-03-03 DIAGNOSIS — Z76.82 ORGAN TRANSPLANT CANDIDATE: Primary | ICD-10-CM

## 2023-03-03 DIAGNOSIS — B78.9 STRONGYLOIDES STERCORALIS INFECTION: ICD-10-CM

## 2023-03-03 DIAGNOSIS — B78.9 STRONGYLOIDES STERCORALIS INFECTION: Primary | ICD-10-CM

## 2023-03-03 LAB — HAV IGG SER QL IA: NORMAL

## 2023-03-03 PROCEDURE — 1159F MED LIST DOCD IN RCRD: CPT | Mod: CPTII,S$GLB,TXP, | Performed by: STUDENT IN AN ORGANIZED HEALTH CARE EDUCATION/TRAINING PROGRAM

## 2023-03-03 PROCEDURE — 90472 HEPATITIS B (RECOMBINANT) ADJUVANTED, 2 DOSE: ICD-10-PCS | Mod: S$GLB,TXP,, | Performed by: STUDENT IN AN ORGANIZED HEALTH CARE EDUCATION/TRAINING PROGRAM

## 2023-03-03 PROCEDURE — 90715 TDAP VACCINE 7 YRS/> IM: CPT | Mod: S$GLB,TXP,, | Performed by: STUDENT IN AN ORGANIZED HEALTH CARE EDUCATION/TRAINING PROGRAM

## 2023-03-03 PROCEDURE — 1160F RVW MEDS BY RX/DR IN RCRD: CPT | Mod: CPTII,S$GLB,TXP, | Performed by: STUDENT IN AN ORGANIZED HEALTH CARE EDUCATION/TRAINING PROGRAM

## 2023-03-03 PROCEDURE — 1160F PR REVIEW ALL MEDS BY PRESCRIBER/CLIN PHARMACIST DOCUMENTED: ICD-10-PCS | Mod: CPTII,S$GLB,TXP, | Performed by: STUDENT IN AN ORGANIZED HEALTH CARE EDUCATION/TRAINING PROGRAM

## 2023-03-03 PROCEDURE — 36415 COLL VENOUS BLD VENIPUNCTURE: CPT | Mod: TXP | Performed by: STUDENT IN AN ORGANIZED HEALTH CARE EDUCATION/TRAINING PROGRAM

## 2023-03-03 PROCEDURE — 3080F PR MOST RECENT DIASTOLIC BLOOD PRESSURE >= 90 MM HG: ICD-10-PCS | Mod: CPTII,S$GLB,TXP, | Performed by: STUDENT IN AN ORGANIZED HEALTH CARE EDUCATION/TRAINING PROGRAM

## 2023-03-03 PROCEDURE — 90694 FLU VACCINE - QUADRIVALENT - ADJUVANTED: ICD-10-PCS | Mod: S$GLB,TXP,, | Performed by: STUDENT IN AN ORGANIZED HEALTH CARE EDUCATION/TRAINING PROGRAM

## 2023-03-03 PROCEDURE — 4010F PR ACE/ARB THEARPY RXD/TAKEN: ICD-10-PCS | Mod: CPTII,S$GLB,TXP, | Performed by: STUDENT IN AN ORGANIZED HEALTH CARE EDUCATION/TRAINING PROGRAM

## 2023-03-03 PROCEDURE — 3077F PR MOST RECENT SYSTOLIC BLOOD PRESSURE >= 140 MM HG: ICD-10-PCS | Mod: CPTII,S$GLB,TXP, | Performed by: STUDENT IN AN ORGANIZED HEALTH CARE EDUCATION/TRAINING PROGRAM

## 2023-03-03 PROCEDURE — 3077F SYST BP >= 140 MM HG: CPT | Mod: CPTII,S$GLB,TXP, | Performed by: STUDENT IN AN ORGANIZED HEALTH CARE EDUCATION/TRAINING PROGRAM

## 2023-03-03 PROCEDURE — 86790 VIRUS ANTIBODY NOS: CPT | Mod: TXP | Performed by: STUDENT IN AN ORGANIZED HEALTH CARE EDUCATION/TRAINING PROGRAM

## 2023-03-03 PROCEDURE — 4010F ACE/ARB THERAPY RXD/TAKEN: CPT | Mod: CPTII,S$GLB,TXP, | Performed by: STUDENT IN AN ORGANIZED HEALTH CARE EDUCATION/TRAINING PROGRAM

## 2023-03-03 PROCEDURE — 90677 PCV20 VACCINE IM: CPT | Mod: S$GLB,TXP,, | Performed by: STUDENT IN AN ORGANIZED HEALTH CARE EDUCATION/TRAINING PROGRAM

## 2023-03-03 PROCEDURE — 90471 PNEUMOCOCCAL CONJUGATE VACCINE 20-VALENT: ICD-10-PCS | Mod: S$GLB,TXP,, | Performed by: STUDENT IN AN ORGANIZED HEALTH CARE EDUCATION/TRAINING PROGRAM

## 2023-03-03 PROCEDURE — 90739 HEPB VACC 2/4 DOSE ADULT IM: CPT | Mod: S$GLB,TXP,, | Performed by: STUDENT IN AN ORGANIZED HEALTH CARE EDUCATION/TRAINING PROGRAM

## 2023-03-03 PROCEDURE — 3066F NEPHROPATHY DOC TX: CPT | Mod: CPTII,S$GLB,TXP, | Performed by: STUDENT IN AN ORGANIZED HEALTH CARE EDUCATION/TRAINING PROGRAM

## 2023-03-03 PROCEDURE — 1159F PR MEDICATION LIST DOCUMENTED IN MEDICAL RECORD: ICD-10-PCS | Mod: CPTII,S$GLB,TXP, | Performed by: STUDENT IN AN ORGANIZED HEALTH CARE EDUCATION/TRAINING PROGRAM

## 2023-03-03 PROCEDURE — 90694 VACC AIIV4 NO PRSRV 0.5ML IM: CPT | Mod: S$GLB,TXP,, | Performed by: STUDENT IN AN ORGANIZED HEALTH CARE EDUCATION/TRAINING PROGRAM

## 2023-03-03 PROCEDURE — 90715 TDAP VACCINE GREATER THAN OR EQUAL TO 7YO IM: ICD-10-PCS | Mod: S$GLB,TXP,, | Performed by: STUDENT IN AN ORGANIZED HEALTH CARE EDUCATION/TRAINING PROGRAM

## 2023-03-03 PROCEDURE — 3080F DIAST BP >= 90 MM HG: CPT | Mod: CPTII,S$GLB,TXP, | Performed by: STUDENT IN AN ORGANIZED HEALTH CARE EDUCATION/TRAINING PROGRAM

## 2023-03-03 PROCEDURE — 99999 PR PBB SHADOW E&M-EST. PATIENT-LVL I: ICD-10-PCS | Mod: PBBFAC,TXP,,

## 2023-03-03 PROCEDURE — 3008F BODY MASS INDEX DOCD: CPT | Mod: CPTII,S$GLB,TXP, | Performed by: STUDENT IN AN ORGANIZED HEALTH CARE EDUCATION/TRAINING PROGRAM

## 2023-03-03 PROCEDURE — 99999 PR PBB SHADOW E&M-EST. PATIENT-LVL IV: ICD-10-PCS | Mod: PBBFAC,TXP,, | Performed by: STUDENT IN AN ORGANIZED HEALTH CARE EDUCATION/TRAINING PROGRAM

## 2023-03-03 PROCEDURE — 90739 HEPATITIS B (RECOMBINANT) ADJUVANTED, 2 DOSE: ICD-10-PCS | Mod: S$GLB,TXP,, | Performed by: STUDENT IN AN ORGANIZED HEALTH CARE EDUCATION/TRAINING PROGRAM

## 2023-03-03 PROCEDURE — 3066F PR DOCUMENTATION OF TREATMENT FOR NEPHROPATHY: ICD-10-PCS | Mod: CPTII,S$GLB,TXP, | Performed by: STUDENT IN AN ORGANIZED HEALTH CARE EDUCATION/TRAINING PROGRAM

## 2023-03-03 PROCEDURE — 3008F PR BODY MASS INDEX (BMI) DOCUMENTED: ICD-10-PCS | Mod: CPTII,S$GLB,TXP, | Performed by: STUDENT IN AN ORGANIZED HEALTH CARE EDUCATION/TRAINING PROGRAM

## 2023-03-03 PROCEDURE — 99204 PR OFFICE/OUTPT VISIT, NEW, LEVL IV, 45-59 MIN: ICD-10-PCS | Mod: S$GLB,TXP,, | Performed by: STUDENT IN AN ORGANIZED HEALTH CARE EDUCATION/TRAINING PROGRAM

## 2023-03-03 PROCEDURE — 90472 IMMUNIZATION ADMIN EACH ADD: CPT | Mod: S$GLB,TXP,, | Performed by: STUDENT IN AN ORGANIZED HEALTH CARE EDUCATION/TRAINING PROGRAM

## 2023-03-03 PROCEDURE — 99204 OFFICE O/P NEW MOD 45 MIN: CPT | Mod: S$GLB,TXP,, | Performed by: STUDENT IN AN ORGANIZED HEALTH CARE EDUCATION/TRAINING PROGRAM

## 2023-03-03 PROCEDURE — 90471 IMMUNIZATION ADMIN: CPT | Mod: S$GLB,TXP,, | Performed by: STUDENT IN AN ORGANIZED HEALTH CARE EDUCATION/TRAINING PROGRAM

## 2023-03-03 PROCEDURE — 99999 PR PBB SHADOW E&M-EST. PATIENT-LVL I: CPT | Mod: PBBFAC,TXP,,

## 2023-03-03 PROCEDURE — 99999 PR PBB SHADOW E&M-EST. PATIENT-LVL IV: CPT | Mod: PBBFAC,TXP,, | Performed by: STUDENT IN AN ORGANIZED HEALTH CARE EDUCATION/TRAINING PROGRAM

## 2023-03-03 PROCEDURE — 90677 PNEUMOCOCCAL CONJUGATE VACCINE 20-VALENT: ICD-10-PCS | Mod: S$GLB,TXP,, | Performed by: STUDENT IN AN ORGANIZED HEALTH CARE EDUCATION/TRAINING PROGRAM

## 2023-03-03 RX ORDER — IVERMECTIN 3 MG/1
200 TABLET ORAL DAILY
Qty: 34 TABLET | Refills: 0 | Status: SHIPPED | OUTPATIENT
Start: 2023-03-03 | End: 2023-03-07

## 2023-03-03 RX ORDER — IVERMECTIN 3 MG/1
200 TABLET ORAL DAILY
Qty: 1 TABLET | Refills: 0 | Status: CANCELLED | OUTPATIENT
Start: 2023-03-03

## 2023-03-03 NOTE — PROGRESS NOTES
Patient received the Prevnar 20 and Hep B #1 vaccines in the left deltoid, and Tdap and HD flu vaccines in the right deltoid. Pt tolerated well. Pt asked to wait in the clinic 15 minutes after injection in the event of an allergic reaction. Pt verbalized understanding. Pt left in NAD.

## 2023-03-03 NOTE — PROGRESS NOTES
PRE-TRANSPLANT INFECTIOUS DISEASE CONSULT    Reason for Visit:  Pre-transplant evaluation  Referring Provider: Chelo Valentino     History of Present Illness:    42 y.o. male with a history of CKD 2/2 to HTN/CHF  presents for pre-kidney transplant evaluation. Feeling well today.    Infectious History:  Recent hospital admissions: No  Recent infections: No  Recent or current antibiotic use: No  History of recurrent infections *(sinus / pneumonia / UTI / SBP)*: No  Recent dental infections, issues or procedures: No  History of chicken pox: No  History of shingles: No  History of STI: No  History of COVID infection: Yes twice (2020, 2021)    History of Immunosuppression:  Prior chemotherapy / immunosuppression: No  Prior transplant: No  History of splenectomy: No    Tuberculosis:  Prior screening for latent TB: No  Prior diagnosis of latent TB: No  Risk factors for TB *known exposure, incarceration, homelessness*: No    Geographical exposures:  Currently lives in [place] with nephew  Lived in the following states: [place], [place], [place] and [place] ([facility])  Lived in [facility] [place]: No  International travel: Yes  Travel-associated illness: No    Social/Environmental:  Occupation:  [place] [employer]  Pets: No  Livestock: No  Fishing / hunting: No  Hobbies: Music (trumpet), poetry  Water: Bottled water  Consumption of raw/undercooked meat or seafood?  No  Tobacco: No  Alcohol: No  Recreational drug use:  No  Sexual partners: No      Past Histories:   Past Medical History:   Diagnosis Date    Allergy     Anemia     Anxiety     CHF (congestive heart failure)     Depression     High blood pressure with chronic kidney disease, stage 5 chronic kidney disease or end stage renal disease     Hypertension      Past Surgical History:   Procedure Laterality Date    HERNIA REPAIR Right     With mesh     No family history on file.  Social History     Tobacco Use    Smoking status: Never     Passive exposure: Never    Smokeless tobacco: Never    Substance Use Topics    Alcohol use: Never    Drug use: Never     Review of patient's allergies indicates:   Allergen Reactions    Mushroom Swelling     Tongue swells          Immunization History:  Received all childhood vaccines: Yes  All household members receive annual flu vaccine: Yes  All household members are up to date on COVID vaccine: Yes    Immunization History   Administered Date(s) Administered    Td (ADULT) 10/05/2005          Current antibiotics:  Antibiotics (From admission, onward)      None              Review of Systems  Review of Systems   Constitutional: Negative for chills and fever.   All other systems reviewed and are negative.       Objective  Physical Exam  Constitutional:       General: He is not in acute distress.     Appearance: He is not ill-appearing or toxic-appearing.   Eyes:      General: No scleral icterus.        Right eye: No discharge.         Left eye: No discharge.   Pulmonary:      Effort: Pulmonary effort is normal.   Skin:     General: Skin is warm and dry.   Neurological:      Mental Status: He is alert and oriented to person, place, and time. Mental status is at baseline.      Motor: No weakness.       MELD: *liver transplant only*  MELD-Na score: 20 at 2/2/2023  7:45 AM  MELD score: 20 at 2/2/2023  7:45 AM  Calculated from:  Serum Creatinine: 8.4 mg/dL (Using max of 4 mg/dL) at 2/2/2023  7:45 AM  Serum Sodium: 141 mmol/L (Using max of 137 mmol/L) at 2/2/2023  7:45 AM  Total Bilirubin: 0.3 mg/dL (Using min of 1 mg/dL) at 2/2/2023  7:45 AM  INR(ratio): 1.0 at 2/2/2023  7:45 AM  Age: 42 years      Labs:    CBC:   Lab Results   Component Value Date    WBC 4.97 02/02/2023    HGB 11.1 (L) 02/02/2023    HCT 35.1 (L) 02/02/2023    MCV 91 02/02/2023     02/02/2023    GRAN 3.0 02/02/2023    GRAN 60.1 02/02/2023    LYMPH 1.3 02/02/2023    LYMPH 25.4 02/02/2023    MONO 0.6 02/02/2023    MONO 11.7 02/02/2023    EOSINOPHIL 2.2 02/02/2023       CMP:   Lab Results   Component  Value Date     02/16/2023    K 4.2 02/16/2023     02/16/2023    CO2 27 02/16/2023    GLU 92 02/16/2023    BUN 77 (H) 02/16/2023    CREATININE 8.7 (H) 02/16/2023    CALCIUM 9.9 02/16/2023    MG 2.0 10/18/2022    ALBUMIN 4.0 02/16/2023    PROT 8.7 (H) 02/02/2023    ALT 11 02/02/2023    AST 9 (L) 02/02/2023    ALKPHOS 49 (L) 02/02/2023    BILITOT 0.3 02/02/2023       Syphilis screening:   Lab Results   Component Value Date    RPR Non-reactive 02/02/2023        TB screening:   Lab Results   Component Value Date    TBGOLDPLUS Negative 02/02/2023       HIV screening:   Lab Results   Component Value Date    [id number] Non-reactive 02/02/2023       Strongyloides IgG:   Lab Results   Component Value Date    STRONGANTIGG Positive (A) 02/02/2023       Hepatitis Serologies:   Lab Results   Component Value Date    HEPBCAB Non-reactive 02/02/2023    HEPCAB Non-reactive 02/02/2023        Varicella IgG:   Lab Results   Component Value Date    VARICELLAINT Negative 02/02/2023       Recent Microbiology:   Microbiology Results (last 7 days)       ** No results found for the last 168 hours. **              Radiology:    None recent      Assessment and Plan    1. Risks of Infection: Available serologies were reviewed. No unusual risks of infection or significant barriers to transplantation were identified from the infectious disease standpoint.   - Pending serologies: Hepatitis A Ab   -positive strongyloides; prescription of ivermectin sent to pt's pharmacy    2. Immunizations:  Based on the patient's immunization history and serologies, the following immunizations are recommended:  - Hepatitis A    -Addendum: Hep A IgG negative, pt does not have immunity to hep A, vaccine ordered   - Hepatitis B    Patient does not have immunity to hepatitis B    Vaccination ordered today: Yes     - COVID    Current CDC vaccination recommendations were discussed with the patient   - Influenza     Annual, high dose preferred   - Prevnar  20   - Tdap   - Varicella vaccine ordered - no immunity or prior exposure    Recommended Pre-Transplant Immunization Schedule  Vaccine  0m 1m 2m 6m   Pneumococcal conjugate vaccine (Prevnar 20) X      Tetanus-diphtheria-pertussis (Tdap)* X      Hepatitis A Vaccine (Havrix)** X   X   Hepatitis B Vaccine (Heplisav)** X X     Influenza X      Varicella X x            *Administer booster every 10 years.       **Administer if no immunity demonstrated on serologies                 3. Counseling:   I discussed with the patient the risk for increased susceptibility to infections following transplantation including increased risk for infection right after transplant and if rejection should occur.  The patient has been counseled on the importance of vaccinations including but not limited to a yearly flu vaccine. Patient was also instructed to encourage that family/caretakers receive their flu vaccine yearly. The patient was encouraged to contact us about any problems that may develop after immunizations and possible side effects were reviewed.     Specific guidance has been provided to the patient regarding the patient's occupation, hobbies and activities to avoid future infectious complications. These include but are not limited to: avoiding raw/undercooked meats and seafood, avoiding unpasteurized milk/cheeses, proper (hand) hygiene, contact with animals and appropriate vaccination of animals, use of mosquito/tick precautions, avoiding walking barefoot, avoiding sick contacts, and seeking medical advice prior to foreign travel (specifically developing countries).     4. Transplant Candidacy: Based on available information, there are no identified significant barriers to transplantation from an infectious disease standpoint.  Final determination of transplant candidacy will be made once evaluation is complete and reviewed by the Selection Committee.      Follow up with infectious disease as needed. Please call if any pending  serologic testing is positive.        51  minutes of total time spent on the encounter, which includes face to face time and non-face to face time preparing to see the patient (eg, review of tests), obtaining and/or reviewing separately obtained history, documenting clinical information in the electronic or other health record, independently interpreting results (not separately reported) and communicating results to the patient/family/caregiver, or care coordination (not separately reported).

## 2023-03-06 ENCOUNTER — PATIENT OUTREACH (OUTPATIENT)
Dept: ADMINISTRATIVE | Facility: OTHER | Age: 43
End: 2023-03-06
Payer: COMMERCIAL

## 2023-03-06 NOTE — PROGRESS NOTES
CHW - Follow Up    This Community Health Worker completed a follow up visit with patient via telephone today.  Pt/Caregiver reported:  patient stated that he received information and do not needs assistance at this time   Community Health Worker provided: chw will closed case at this time  Follow up required:   No future outreach task assigned       CHW - Case Closure    This Community Health Worker spoke to patient via telephone today.   Pt/Caregiver denied any additional needs at this time and agrees with episode closure at this time.  Provided patient with Community Health Worker's contact information and encouraged him/her to contact this Community Health Worker if additional needs arise.

## 2023-03-09 ENCOUNTER — PATIENT MESSAGE (OUTPATIENT)
Dept: FAMILY MEDICINE | Facility: CLINIC | Age: 43
End: 2023-03-09

## 2023-03-09 ENCOUNTER — TELEPHONE (OUTPATIENT)
Dept: FAMILY MEDICINE | Facility: CLINIC | Age: 43
End: 2023-03-09

## 2023-03-09 ENCOUNTER — CLINICAL SUPPORT (OUTPATIENT)
Dept: FAMILY MEDICINE | Facility: CLINIC | Age: 43
End: 2023-03-09
Payer: COMMERCIAL

## 2023-03-09 VITALS — SYSTOLIC BLOOD PRESSURE: 138 MMHG | DIASTOLIC BLOOD PRESSURE: 88 MMHG | HEART RATE: 82 BPM

## 2023-03-09 PROCEDURE — 99999 PR PBB SHADOW E&M-EST. PATIENT-LVL II: CPT | Mod: PBBFAC,,,

## 2023-03-09 PROCEDURE — 99999 PR PBB SHADOW E&M-EST. PATIENT-LVL II: ICD-10-PCS | Mod: PBBFAC,,,

## 2023-03-09 NOTE — TELEPHONE ENCOUNTER
Called and spoke to patient, advised of new amlodipine recommendation and instructions, scheduled 2 week follow-up nurse visit  for bp check with home log, and patient verbalized understanding to all.

## 2023-03-09 NOTE — PROGRESS NOTES
João Ham 42 y.o. male is here today for Blood Pressure check.   History of HTN yes.    Review of patient's allergies indicates:   Allergen Reactions    Mushroom Swelling     Tongue swells      Creatinine   Date Value Ref Range Status   02/16/2023 8.7 (H) 0.5 - 1.4 mg/dL Final     Sodium   Date Value Ref Range Status   02/16/2023 141 136 - 145 mmol/L Final     Potassium   Date Value Ref Range Status   02/16/2023 4.2 3.5 - 5.1 mmol/L Final   ]  Patient verifies taking blood pressure medications on a regular basis at the same time of the day. Informed me that he did not start taking  The amlodipine 5 mg as well as the olmesartan, (both of which were only recently recc by Dr. Granado) until this past Sat and explains  That he did not get paid for the entire month of Feb. And just got paid a few days ago.    Current Outpatient Medications:     amLODIPine (NORVASC) 5 MG tablet, Take 1 tablet (5 mg total) by mouth once daily., Disp: 30 tablet, Rfl: 6    calcitRIOL (ROCALTROL) 0.25 MCG Cap, Take 1 capsule (0.25 mcg total) by mouth once daily., Disp: 30 capsule, Rfl: 3    calcium carbonate (TUMS) 200 mg calcium (500 mg) chewable tablet, Take 2 tablets (1,000 mg total) by mouth 3 (three) times daily with meals., Disp: 180 tablet, Rfl: 11    cholecalciferol, vitamin D3, (VITAMIN D3) 25 mcg (1,000 unit) capsule, Take 1 tablet by mouth once daily., Disp: , Rfl:     cloNIDine (CATAPRES) 0.3 MG tablet, Take 1 tablet (0.3 mg total) by mouth 3 (three) times daily., Disp: 270 tablet, Rfl: 1    furosemide (LASIX) 80 MG tablet, Take 1 tablet (80 mg total) by mouth 3 (three) times daily with meals., Disp: 90 tablet, Rfl: 11    hydrALAZINE (APRESOLINE) 100 MG tablet, Take 1 tablet (100 mg total) by mouth 3 (three) times daily., Disp: 90 tablet, Rfl: 11    isosorbide mononitrate (IMDUR) 120 MG 24 hr tablet, Take 1 tablet (120 mg total) by mouth once daily., Disp: 30 tablet, Rfl: 11    metoprolol succinate (TOPROL-XL) 50 MG 24 hr  tablet, Take 3 tablets (150 mg total) by mouth once daily., Disp: 90 tablet, Rfl: 11    olmesartan (BENICAR) 40 MG tablet, Take 1 tablet (40 mg total) by mouth once daily., Disp: 90 tablet, Rfl: 1    sevelamer carbonate (RENVELA) 800 mg Tab, Take 2 tablets (1,600 mg total) by mouth 3 (three) times daily with meals., Disp: 180 tablet, Rfl: 11    Does patient have record of home blood pressure readings no,. Readings have been averaging 140's/80's90's.   Last dose of blood pressure medication was taken at 12:00 noon today 3/9.  Patient is asymptomatic.   Complains of still not sleeping well. Reportedly only sleeping about 4 hours a night altogether but interrupted. Sleep study sched.3 /14    Initial /96  2nd BP after 5 minutes 138/98  Blood pressure reading after 5 minutes was 138/88, Pulse 82.  3/14 Sleep Study  3/22 labs ([facility])  3/24 fu [facility] ([facility])  4/18 Pulmonology 3 mo compliance rpt.  5/15 Urology 4 month fu 6/1 3 mo fu Dr. Loy Granado notified.

## 2023-03-09 NOTE — TELEPHONE ENCOUNTER
Thank you for the update I would recommend that he take 10 mg of amlodipine and his blood pressure should be well controlled after that change in he can continue to log get and we can have another blood pressure check in the coming weeks thanks

## 2023-03-14 ENCOUNTER — PROCEDURE VISIT (OUTPATIENT)
Dept: SLEEP MEDICINE | Facility: HOSPITAL | Age: 43
End: 2023-03-14
Attending: STUDENT IN AN ORGANIZED HEALTH CARE EDUCATION/TRAINING PROGRAM
Payer: COMMERCIAL

## 2023-03-14 DIAGNOSIS — Z91.89 AT RISK FOR OBSTRUCTIVE SLEEP APNEA: ICD-10-CM

## 2023-03-14 PROCEDURE — 95810 POLYSOM 6/> YRS 4/> PARAM: CPT

## 2023-03-15 ENCOUNTER — TELEPHONE (OUTPATIENT)
Dept: TRANSPLANT | Facility: CLINIC | Age: 43
End: 2023-03-15
Payer: COMMERCIAL

## 2023-03-15 ENCOUNTER — SOCIAL WORK (OUTPATIENT)
Dept: TRANSPLANT | Facility: CLINIC | Age: 43
End: 2023-03-15
Payer: COMMERCIAL

## 2023-03-15 NOTE — PROGRESS NOTES
" spoke with patient over the phone to follow up on caregiver's and patient's STD concerns. Patient reports his STD issue has resolved and he is expecting to receive a check tomorrow. Patient reports he has been unable to obtain a Hepatitis A medication due to the issue with his STD. Patient reports he will be able to  his medications tomorrow. Patient noted that he had a sleep study and was informed he has "severe sleep apnea" and is considering surgery as a treatment option.  informed patient that she would notify his coordinator of the above information, patient voiced understanding and agreement with this.    Patient reported that since his STD concern has resolved he has experienced improved mental health and stated " I'm a lot better now that everything is clearer". Patient reported that he has been approved for SSD and that he was informed he will begin receiving disability payments in June 2023.     Patient reported that he is unsure of who his caregiver will be post transplant. Patient previously named his grandparents as caregivers and stated that he would stay with them in [place], [place] . At this time patient stated that he intends to stay at his home in [place], [place], and is looking for someone who will be able to stay with him there. Patient denied being able to stay at his grandparents stating "It would be a burden for them, and stressful for me". Patient reported that his previously listed back up caregivers are unavailable at this time. Patient also noted that his grandfather has expressed interest in living kidney donation. This  informed patient that his grandfather would be unable to be a caregiver and a donor and his grandfather would require his own caregiver if approved for living donation, patient voiced understanding of this.       stressed that patient is unable to be listed for kidney transplant without a confirmed care giving plan " and patient may need to be re-referred. Patient voiced understanding and asked this  to call him tomorrow, so he could reach out to family and friends.  agreed to call patient tomorrow to follow up.  informed patient's caregiver Reid Cruz of the above information and plan for this  to contact the patient tomorrow.       Peggy Howe LMSW

## 2023-03-15 NOTE — TELEPHONE ENCOUNTER
Returned phone call to patient. Patient reports he had a sleep study complete on yesterday, and it showed that he has severe sleep apnea. Reports the sleep study was ordered by his primary care physician, and he is currently following up with pulmonary for a possible corrective surgery. Instructed I will inform the transplant team of the new medical information received. Patient reports understanding. Denies other questions or concerns at this time.

## 2023-03-15 NOTE — TELEPHONE ENCOUNTER
MA notes per adherence form    FOR THE PAST THREE MONTHS:    0-Appt Adhrence  0-No show    No concerns with labs, care giving, transportation, or mental health    Brought over to clinic to be scanned in.    Florinda Clements  Abdominal Transplant MA

## 2023-03-16 ENCOUNTER — TELEPHONE (OUTPATIENT)
Dept: TRANSPLANT | Facility: CLINIC | Age: 43
End: 2023-03-16
Payer: COMMERCIAL

## 2023-03-16 ENCOUNTER — TELEPHONE (OUTPATIENT)
Dept: TRANSPLANT | Facility: HOSPITAL | Age: 43
End: 2023-03-16
Payer: COMMERCIAL

## 2023-03-16 NOTE — TELEPHONE ENCOUNTER
"DEANGELO returned call to Lizy at phone number provided to review transplant caregiver education and confirm caregiver availability for transplant.  Lizy [place]of[place] ([place] but has spoken with the patient about transplant support and made plans to move into pt's home once patient is listed for transplant.  Lizy reports "I already stay on top of him" and reports commitment to supporting pt in adhering to all aspects of his healthcare regimen and recommendations.  Lizy reports working from home and denies having any concerns with work or any other obligations which would limit her availability to patient.  Lizy denies having any questions or concerns regarding transplant caregiver role at this time and is aware of how to reach transplant team for future needs.    Name: Lizy Alatorre  Age: 35  Phone: [phone]  City: unknown State: [place]  Relationship: friend  Does person drive? yes      ----- Message from Kathryn Davey sent at 3/16/2023  4:33 PM CDT -----  Regarding: Returning call  The pt friend Lizy  is returning call to DEANGELO Woods , in regards to being a caregiver. Requesting a callback    Lizy [phone]    "

## 2023-03-16 NOTE — TELEPHONE ENCOUNTER
----- Message from Pushpa Loredo sent at 3/16/2023  1:02 PM CDT -----  Regarding: speak to Isidro  The patient called requesting to speak to Isidro  Please return call at your earliest convenience    No further information provided      Patient can be contacted @# [phone] (home)

## 2023-03-17 ENCOUNTER — COMMITTEE REVIEW (OUTPATIENT)
Dept: TRANSPLANT | Facility: CLINIC | Age: 43
End: 2023-03-17
Payer: COMMERCIAL

## 2023-03-17 NOTE — LETTER
March 17, 2023    Dr. Willy Hanna  [address] [address]  [address]  [facility] [address]  Phone: [phone]  Fax: [phone]     Dear Dr. Willy Hanna:    Patient: João Ham   MR Number: [id number]   YOB: 1980     Your patient, João Ham, was recently discussed at the [facility] Selection Committee meeting on 3/17/2023. I am happy to inform you that João has been approved for transplantation.  He has met selection criteria for a kidney transplant related to CKD stage 5 secondary to primary diagnosis of Malignant Hypertension. Your patient will be placed on the cadaveric wait list pending final financial approval from insurance company.     We appreciate your confidence in allowing us to participate in your patients care.  If you have any questions or concerns, please do not hesitate to contact me.    Sincerely,      Charlette Chavez MD  Medical Director, Kidney & Kidney/Pancreas Transplantation  jr:   Enclosure  Cc: João Ham (Patient)

## 2023-03-17 NOTE — COMMITTEE REVIEW
Native Organ Dx: Malignant Hypertension      SELECTION COMMITTEE NOTE    João Ham was presented at selection committee on 3/17/23.  Patient met selection criteria for kidney transplant related to CKD, Stage 5 due to    Malignant Hypertension.  No absolute contraindications to transplant at this time. Weight loss is strongly recommended and patient will need to have a caregiver available for follow up appointments. Patient will be placed on the cadaveric wait list pending final financial approval from insurance company.  Patient will return to clinic for routine appointment in 1 year(s). Patient does not meet criteria for High KDPI kidney offer. Patient meets HCV+ kidney offer. Patient does not meet criteria for dual/enbloc, due to weight.    Patient to continue follow up with pulmonary regarding sleep apnea.       Patient informed of committee's decision. Reports understanding. Denies any questions or concerns at this time.       Note written by MELISSA Cruz RN    ===============================================    I was present at the meeting and attest to the decision of the committee.

## 2023-03-22 ENCOUNTER — CLINICAL SUPPORT (OUTPATIENT)
Dept: FAMILY MEDICINE | Facility: CLINIC | Age: 43
End: 2023-03-22
Payer: COMMERCIAL

## 2023-03-22 ENCOUNTER — TELEPHONE (OUTPATIENT)
Dept: FAMILY MEDICINE | Facility: CLINIC | Age: 43
End: 2023-03-22

## 2023-03-22 ENCOUNTER — LAB VISIT (OUTPATIENT)
Dept: LAB | Facility: HOSPITAL | Age: 43
End: 2023-03-22
Attending: INTERNAL MEDICINE
Payer: COMMERCIAL

## 2023-03-22 ENCOUNTER — TELEPHONE (OUTPATIENT)
Dept: FAMILY MEDICINE | Facility: CLINIC | Age: 43
End: 2023-03-22
Payer: COMMERCIAL

## 2023-03-22 ENCOUNTER — PATIENT MESSAGE (OUTPATIENT)
Dept: FAMILY MEDICINE | Facility: CLINIC | Age: 43
End: 2023-03-22

## 2023-03-22 VITALS — HEART RATE: 80 BPM | SYSTOLIC BLOOD PRESSURE: 130 MMHG | DIASTOLIC BLOOD PRESSURE: 88 MMHG

## 2023-03-22 DIAGNOSIS — E79.0 HYPERURICEMIA: Primary | ICD-10-CM

## 2023-03-22 DIAGNOSIS — Z01.30 BP CHECK: Primary | ICD-10-CM

## 2023-03-22 DIAGNOSIS — N18.5 STAGE 5 CHRONIC KIDNEY DISEASE NOT ON CHRONIC DIALYSIS: ICD-10-CM

## 2023-03-22 DIAGNOSIS — I13.0 HYPERTENSIVE HEART AND RENAL DISEASE WITH CONGESTIVE HEART FAILURE: ICD-10-CM

## 2023-03-22 LAB
ALBUMIN SERPL BCP-MCNC: 3.8 G/DL (ref 3.5–5.2)
ANION GAP SERPL CALC-SCNC: 13 MMOL/L (ref 8–16)
BUN SERPL-MCNC: 70 MG/DL (ref 6–20)
CALCIUM SERPL-MCNC: 9.1 MG/DL (ref 8.7–10.5)
CHLORIDE SERPL-SCNC: 99 MMOL/L (ref 95–110)
CO2 SERPL-SCNC: 29 MMOL/L (ref 23–29)
CREAT SERPL-MCNC: 7.6 MG/DL (ref 0.5–1.4)
EST. GFR  (NO RACE VARIABLE): 8.5 ML/MIN/1.73 M^2
GLUCOSE SERPL-MCNC: 96 MG/DL (ref 70–110)
PHOSPHATE SERPL-MCNC: 5 MG/DL (ref 2.7–4.5)
POTASSIUM SERPL-SCNC: 4.2 MMOL/L (ref 3.5–5.1)
SODIUM SERPL-SCNC: 141 MMOL/L (ref 136–145)

## 2023-03-22 PROCEDURE — 99999 PR PBB SHADOW E&M-EST. PATIENT-LVL I: CPT | Mod: PBBFAC,,,

## 2023-03-22 PROCEDURE — 99999 PR PBB SHADOW E&M-EST. PATIENT-LVL I: ICD-10-PCS | Mod: PBBFAC,,,

## 2023-03-22 PROCEDURE — 80069 RENAL FUNCTION PANEL: CPT | Mod: NTX | Performed by: INTERNAL MEDICINE

## 2023-03-22 PROCEDURE — 36415 COLL VENOUS BLD VENIPUNCTURE: CPT | Mod: PO,NTX | Performed by: INTERNAL MEDICINE

## 2023-03-22 RX ORDER — CLONIDINE HYDROCHLORIDE 0.3 MG/1
0.3 TABLET ORAL 3 TIMES DAILY
Qty: 270 TABLET | Refills: 1 | Status: ON HOLD | OUTPATIENT
Start: 2023-03-22 | End: 2023-06-28

## 2023-03-22 RX ORDER — ALLOPURINOL 100 MG/1
50 TABLET ORAL
Qty: 12 TABLET | Refills: 1 | Status: ON HOLD | OUTPATIENT
Start: 2023-03-23 | End: 2023-06-28 | Stop reason: HOSPADM

## 2023-03-22 RX ORDER — AMLODIPINE BESYLATE 5 MG/1
5 TABLET ORAL DAILY
Qty: 30 TABLET | Refills: 6 | Status: CANCELLED | OUTPATIENT
Start: 2023-03-22

## 2023-03-22 RX ORDER — AMLODIPINE BESYLATE 10 MG/1
10 TABLET ORAL DAILY
Qty: 90 TABLET | Refills: 3 | Status: ON HOLD | OUTPATIENT
Start: 2023-03-22 | End: 2023-06-28 | Stop reason: SDUPTHER

## 2023-03-22 NOTE — TELEPHONE ENCOUNTER
João Ham 42 y.o. male is here today for Blood Pressure check.   History of HTN yes.    Review of patient's allergies indicates:   Allergen Reactions    Mushroom Swelling     Tongue swells      Creatinine   Date Value Ref Range Status   02/16/2023 8.7 (H) 0.5 - 1.4 mg/dL Final     Sodium   Date Value Ref Range Status   02/16/2023 141 136 - 145 mmol/L Final     Potassium   Date Value Ref Range Status   02/16/2023 4.2 3.5 - 5.1 mmol/L Final     Patient verifies taking blood pressure medications on a regular basis at the same time of the day.     Current Outpatient Medications:     amLODIPine (NORVASC) 5 MG tablet, Take 1 tablet (5 mg total) by mouth once daily., Disp: 30 tablet, Rfl: 6    calcitRIOL (ROCALTROL) 0.25 MCG Cap, Take 1 capsule (0.25 mcg total) by mouth once daily., Disp: 30 capsule, Rfl: 3    calcium carbonate (TUMS) 200 mg calcium (500 mg) chewable tablet, Take 2 tablets (1,000 mg total) by mouth 3 (three) times daily with meals., Disp: 180 tablet, Rfl: 11    cholecalciferol, vitamin D3, (VITAMIN D3) 25 mcg (1,000 unit) capsule, Take 1 tablet by mouth once daily., Disp: , Rfl:     cloNIDine (CATAPRES) 0.3 MG tablet, Take 1 tablet (0.3 mg total) by mouth 3 (three) times daily., Disp: 270 tablet, Rfl: 1    furosemide (LASIX) 80 MG tablet, Take 1 tablet (80 mg total) by mouth 3 (three) times daily with meals., Disp: 90 tablet, Rfl: 11    hydrALAZINE (APRESOLINE) 100 MG tablet, Take 1 tablet (100 mg total) by mouth 3 (three) times daily., Disp: 90 tablet, Rfl: 11    isosorbide mononitrate (IMDUR) 120 MG 24 hr tablet, Take 1 tablet (120 mg total) by mouth once daily., Disp: 30 tablet, Rfl: 11    metoprolol succinate (TOPROL-XL) 50 MG 24 hr tablet, Take 3 tablets (150 mg total) by mouth once daily., Disp: 90 tablet, Rfl: 11    olmesartan (BENICAR) 40 MG tablet, Take 1 tablet (40 mg total) by mouth once daily., Disp: 90 tablet, Rfl: 1    sevelamer carbonate (RENVELA) 800 mg Tab, Take 2 tablets (1,600 mg  total) by mouth 3 (three) times daily with meals., Disp: 180 tablet, Rfl: 11    Does patient have record of home blood pressure readings no, not written, however he is able to confirm that his home BP readings have been averaging 130's-140's/80's-90's.   Last dose of blood pressure medication was taken at 9:00 am today 3/22.  Patient is asymptomatic.   Pt informed me that he was approved for a kidney transplant this past Monday  And that he was dx'd with severe sleep apnea and has to go back soon to get fitted appropriately for the equipment as he was congested the first time he went to the sleep lab and was unable to breathe through his nose.    Initial BP today was 138/90  Blood pressure reading after 5 minutes was 134/88, Pulse 80.  3rd BP after 10 minutes was 130/88    3 mo roger Granado 6/1/23  Dr. Dawson Granado notified.

## 2023-03-22 NOTE — TELEPHONE ENCOUNTER
Notified pt. Regarding Dr. Granado's recent medication recommendations after reviewing information from pt's nurse visit for BP check with me today.Pt. voice understanding of instructions.

## 2023-03-22 NOTE — TELEPHONE ENCOUNTER
While in for NVBP check with me pt. Informed me that his short term disability runs out on 4/7, so he presented with papers from his insurance co requesting updated medical records and update work status sheet from Dr. Granado  from 3/1/23 to present.     I will copy and send the above requested medical records from 3/1/23 to date and sent msg. To Dr. Granado requesting that he provide an updated work status sheet from 3/1/23 to present.

## 2023-03-23 ENCOUNTER — PATIENT MESSAGE (OUTPATIENT)
Dept: FAMILY MEDICINE | Facility: CLINIC | Age: 43
End: 2023-03-23
Payer: COMMERCIAL

## 2023-03-23 NOTE — TELEPHONE ENCOUNTER
Faxed updated Medicals and RTW Status from Dr. Granado to [employer], to the following fax # [phone], and received fax confirmation on this date.

## 2023-03-24 ENCOUNTER — OFFICE VISIT (OUTPATIENT)
Dept: NEPHROLOGY | Facility: CLINIC | Age: 43
End: 2023-03-24
Payer: COMMERCIAL

## 2023-03-24 DIAGNOSIS — N18.5 STAGE 5 CHRONIC KIDNEY DISEASE NOT ON CHRONIC DIALYSIS: ICD-10-CM

## 2023-03-24 DIAGNOSIS — D63.1 ANEMIA DUE TO STAGE 5 CHRONIC KIDNEY DISEASE, NOT ON CHRONIC DIALYSIS: ICD-10-CM

## 2023-03-24 DIAGNOSIS — E21.3 HYPERPARATHYROIDISM: ICD-10-CM

## 2023-03-24 DIAGNOSIS — R80.1 PERSISTENT PROTEINURIA: ICD-10-CM

## 2023-03-24 DIAGNOSIS — N18.5 ANEMIA DUE TO STAGE 5 CHRONIC KIDNEY DISEASE, NOT ON CHRONIC DIALYSIS: ICD-10-CM

## 2023-03-24 DIAGNOSIS — E66.01 SEVERE OBESITY WITH BODY MASS INDEX (BMI) OF 35.0 TO 39.9 WITH COMORBIDITY: ICD-10-CM

## 2023-03-24 DIAGNOSIS — I13.0 HYPERTENSIVE HEART AND RENAL DISEASE WITH CONGESTIVE HEART FAILURE: ICD-10-CM

## 2023-03-24 DIAGNOSIS — I27.20 PULMONARY HYPERTENSION: Primary | ICD-10-CM

## 2023-03-24 PROBLEM — E66.9 OBESITY (BMI 30-39.9): Status: RESOLVED | Noted: 2022-10-10 | Resolved: 2023-03-24

## 2023-03-24 PROCEDURE — 4010F ACE/ARB THERAPY RXD/TAKEN: CPT | Mod: CPTII,95,, | Performed by: INTERNAL MEDICINE

## 2023-03-24 PROCEDURE — 99214 PR OFFICE/OUTPT VISIT, EST, LEVL IV, 30-39 MIN: ICD-10-PCS | Mod: 95,,, | Performed by: INTERNAL MEDICINE

## 2023-03-24 PROCEDURE — 1159F MED LIST DOCD IN RCRD: CPT | Mod: CPTII,95,, | Performed by: INTERNAL MEDICINE

## 2023-03-24 PROCEDURE — 3066F NEPHROPATHY DOC TX: CPT | Mod: CPTII,95,, | Performed by: INTERNAL MEDICINE

## 2023-03-24 PROCEDURE — 4010F PR ACE/ARB THEARPY RXD/TAKEN: ICD-10-PCS | Mod: CPTII,95,, | Performed by: INTERNAL MEDICINE

## 2023-03-24 PROCEDURE — 99214 OFFICE O/P EST MOD 30 MIN: CPT | Mod: 95,,, | Performed by: INTERNAL MEDICINE

## 2023-03-24 PROCEDURE — 1159F PR MEDICATION LIST DOCUMENTED IN MEDICAL RECORD: ICD-10-PCS | Mod: CPTII,95,, | Performed by: INTERNAL MEDICINE

## 2023-03-24 PROCEDURE — 1160F RVW MEDS BY RX/DR IN RCRD: CPT | Mod: CPTII,95,, | Performed by: INTERNAL MEDICINE

## 2023-03-24 PROCEDURE — 1160F PR REVIEW ALL MEDS BY PRESCRIBER/CLIN PHARMACIST DOCUMENTED: ICD-10-PCS | Mod: CPTII,95,, | Performed by: INTERNAL MEDICINE

## 2023-03-24 PROCEDURE — 3066F PR DOCUMENTATION OF TREATMENT FOR NEPHROPATHY: ICD-10-PCS | Mod: CPTII,95,, | Performed by: INTERNAL MEDICINE

## 2023-03-24 RX ORDER — IVERMECTIN 3 MG/1
200 TABLET ORAL DAILY
Qty: 34 TABLET | Refills: 0 | Status: SHIPPED | OUTPATIENT
Start: 2023-03-24 | End: 2023-04-15

## 2023-03-24 NOTE — PROGRESS NOTES
The patient location is: [place]  The chief complaint leading to consultation is: CKD    Visit type: audio only    Face to Face time with patient: 10 min  20 minutes of total time spent on the encounter, which includes face to face time and non-face to face time preparing to see the patient (eg, review of tests), Obtaining and/or reviewing separately obtained history, Documenting clinical information in the electronic or other health record, Independently interpreting results (not separately reported) and communicating results to the patient/family/caregiver, or Care coordination (not separately reported).         Each patient to whom he or she provides medical services by telemedicine is:  (1) informed of the relationship between the physician and patient and the respective role of any other health care provider with respect to management of the patient; and (2) notified that he or she may decline to receive medical services by telemedicine and may withdraw from such care at any time.    Notes:     Subjective:       Patient ID: João Ham is a 42 y.o. Black or  male who presents for follow up on CKD.     Since last seen at nephrology clinic, João Ham was switched from losartan to olmesartan.   No uremic symptoms, not volume overloaded.    Reports taking their medications on time, without missed doses. As to their chronic conditions:  - CKD: Denies use of NSAIDs.   2022: baseline sr cr of 7 - 7.5 mg/dL with UPCR 1.1  - HTN: controlled, follows low salt diet. Denies uncontrolled HTN, dizziness/lightheadedness or hypotension/syncopal episodes.  - dHF, echo in 2022: Left ventricle is normal in size with mild concentric hypertrophy and normal systolic function. Grade III left ventricular  diastolic dysfunction. Moderate right ventricular enlargement with mildly to moderately reduced right ventricular systolic function. Severe left atrial enlargement. There is moderate pulmonary hypertension.     Review  of Systems   Constitutional:  Negative for appetite change, chills and fever.   HENT:  Negative for congestion.    Eyes:  Negative for visual disturbance.   Respiratory:  Negative for cough and shortness of breath.    Cardiovascular:  Negative for chest pain and leg swelling.   Gastrointestinal:  Negative for abdominal pain, diarrhea, nausea and vomiting.   Genitourinary:  Negative for difficulty urinating, dysuria and hematuria.   Musculoskeletal:  Negative for myalgias.   Skin:  Negative for rash.   Neurological:  Negative for headaches.   Psychiatric/Behavioral:  Negative for sleep disturbance.      The past medical, family and social histories were reviewed for this encounter.     There were no vitals taken for this visit.    Objective:      Physical Exam  Vitals reviewed.   Constitutional:       General: He is not in acute distress.     Appearance: He is well-developed.   HENT:      Head: Normocephalic and atraumatic.   Eyes:      General: No scleral icterus.  Pulmonary:      Effort: Pulmonary effort is normal. No respiratory distress.   Neurological:      Mental Status: He is alert and oriented to person, place, and time.   Psychiatric:         Mood and Affect: Mood normal.         Behavior: Behavior normal.       Assessment:       1. Pulmonary hypertension    2. Hypertensive heart and renal disease with congestive heart failure    3. Stage 5 chronic kidney disease not on chronic dialysis    4. Persistent proteinuria    5. Anemia due to stage 5 chronic kidney disease, not on chronic dialysis    6. Hyperparathyroidism    7. 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  Baseline creatinine is 7 - 7.5 mg/dL and sub-nephrotic proteinuria  Lab Results   Component Value Date    CREATININE 7.6 (H) 03/22/2023      - Euvolemic   - Complete avoidance of NSAIDs   - Low salt diet with <  2000 mg/day   - Dose adjust medications to GFR of < 15   - Avoid nephrotoxins including IV contrast   - Does not want to start RRT   - Kidney smart class    HTN   - BP controlled: avoid hypotension and dehydration   - Pt had not taken his meds today    Proteinuria due to arterionephrosclerosis    - On Olmesartan    Anemia   - Stable     SHPT  Lab Results   Component Value Date    .6 (H) 02/02/2023    CALCIUM 9.1 03/22/2023    CAION 1.10 10/18/2022    PHOS 5.0 (H) 03/22/2023    - Cont Vit D and phos binders    pHTN and HF   - On lasix    Obesity    - Diet and exercise    Med changes: none

## 2023-03-29 ENCOUNTER — TELEPHONE (OUTPATIENT)
Dept: PHARMACY | Facility: CLINIC | Age: 43
End: 2023-03-29
Payer: COMMERCIAL

## 2023-03-31 ENCOUNTER — PATIENT MESSAGE (OUTPATIENT)
Dept: FAMILY MEDICINE | Facility: CLINIC | Age: 43
End: 2023-03-31
Payer: COMMERCIAL

## 2023-03-31 DIAGNOSIS — G47.30 SLEEP APNEA, UNSPECIFIED TYPE: Primary | ICD-10-CM

## 2023-03-31 NOTE — PROGRESS NOTES
Please let the patient know that he has sleep apnea is important for him to see the sleep medicine specialist I placed referral.  My improving his sleep apnea we can lower his blood pressure and help his fatigue

## 2023-04-19 ENCOUNTER — TELEPHONE (OUTPATIENT)
Dept: FAMILY MEDICINE | Facility: CLINIC | Age: 43
End: 2023-04-19

## 2023-04-19 ENCOUNTER — OFFICE VISIT (OUTPATIENT)
Dept: FAMILY MEDICINE | Facility: CLINIC | Age: 43
End: 2023-04-19
Payer: COMMERCIAL

## 2023-04-19 VITALS
BODY MASS INDEX: 42.28 KG/M2 | DIASTOLIC BLOOD PRESSURE: 90 MMHG | SYSTOLIC BLOOD PRESSURE: 142 MMHG | RESPIRATION RATE: 18 BRPM | OXYGEN SATURATION: 97 % | HEIGHT: 71 IN | WEIGHT: 302 LBS | HEART RATE: 78 BPM | TEMPERATURE: 99 F

## 2023-04-19 DIAGNOSIS — I13.0 HYPERTENSIVE HEART AND RENAL DISEASE WITH CONGESTIVE HEART FAILURE: ICD-10-CM

## 2023-04-19 DIAGNOSIS — N18.5 STAGE 5 CHRONIC KIDNEY DISEASE NOT ON CHRONIC DIALYSIS: ICD-10-CM

## 2023-04-19 DIAGNOSIS — G47.30 SLEEP APNEA, UNSPECIFIED TYPE: ICD-10-CM

## 2023-04-19 DIAGNOSIS — Z76.89 RETURN TO WORK EVALUATION: Primary | ICD-10-CM

## 2023-04-19 PROCEDURE — 3066F PR DOCUMENTATION OF TREATMENT FOR NEPHROPATHY: ICD-10-PCS | Mod: CPTII,S$GLB,,

## 2023-04-19 PROCEDURE — 4010F PR ACE/ARB THEARPY RXD/TAKEN: ICD-10-PCS | Mod: CPTII,S$GLB,,

## 2023-04-19 PROCEDURE — 3066F NEPHROPATHY DOC TX: CPT | Mod: CPTII,S$GLB,,

## 2023-04-19 PROCEDURE — 1159F MED LIST DOCD IN RCRD: CPT | Mod: CPTII,S$GLB,,

## 2023-04-19 PROCEDURE — 3008F BODY MASS INDEX DOCD: CPT | Mod: CPTII,S$GLB,,

## 2023-04-19 PROCEDURE — 3008F PR BODY MASS INDEX (BMI) DOCUMENTED: ICD-10-PCS | Mod: CPTII,S$GLB,,

## 2023-04-19 PROCEDURE — 99214 OFFICE O/P EST MOD 30 MIN: CPT | Mod: S$GLB,,,

## 2023-04-19 PROCEDURE — 3077F PR MOST RECENT SYSTOLIC BLOOD PRESSURE >= 140 MM HG: ICD-10-PCS | Mod: CPTII,S$GLB,,

## 2023-04-19 PROCEDURE — 4010F ACE/ARB THERAPY RXD/TAKEN: CPT | Mod: CPTII,S$GLB,,

## 2023-04-19 PROCEDURE — 99999 PR PBB SHADOW E&M-EST. PATIENT-LVL IV: CPT | Mod: PBBFAC,,,

## 2023-04-19 PROCEDURE — 1160F PR REVIEW ALL MEDS BY PRESCRIBER/CLIN PHARMACIST DOCUMENTED: ICD-10-PCS | Mod: CPTII,S$GLB,,

## 2023-04-19 PROCEDURE — 3077F SYST BP >= 140 MM HG: CPT | Mod: CPTII,S$GLB,,

## 2023-04-19 PROCEDURE — 99999 PR PBB SHADOW E&M-EST. PATIENT-LVL IV: ICD-10-PCS | Mod: PBBFAC,,,

## 2023-04-19 PROCEDURE — 99214 PR OFFICE/OUTPT VISIT, EST, LEVL IV, 30-39 MIN: ICD-10-PCS | Mod: S$GLB,,,

## 2023-04-19 PROCEDURE — 3080F DIAST BP >= 90 MM HG: CPT | Mod: CPTII,S$GLB,,

## 2023-04-19 PROCEDURE — 1160F RVW MEDS BY RX/DR IN RCRD: CPT | Mod: CPTII,S$GLB,,

## 2023-04-19 PROCEDURE — 1159F PR MEDICATION LIST DOCUMENTED IN MEDICAL RECORD: ICD-10-PCS | Mod: CPTII,S$GLB,,

## 2023-04-19 PROCEDURE — 3080F PR MOST RECENT DIASTOLIC BLOOD PRESSURE >= 90 MM HG: ICD-10-PCS | Mod: CPTII,S$GLB,,

## 2023-04-19 NOTE — PROGRESS NOTES
Subjective:       Patient ID: João Ham is a 42 y.o. male.    Chief Complaint: Disability paperwork    Presents to the clinic to discuss disability paperowkr. Previous paperwork stated that return to work 04/30 without restrictions. He requests an extension of this date. 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 When asked about his kidney transplant he states that now his grandfather is in line to provide a kidney and if not his father is a backup. He says that sleep apnea is what's currently holding up scheduling the surgery as he had a lab study done but couldn't tolerate the CPAP. He has follow up with pulm scheduled to discuss next steps.       Past Medical History:   Diagnosis Date    Allergy     Anemia     Anxiety     CHF (congestive heart failure)     Depression     High blood pressure with chronic kidney disease, stage 5 chronic kidney disease or end stage renal disease     Hypertension        Review of patient's allergies indicates:   Allergen Reactions    Mushroom Swelling     Tongue swells          Current Outpatient Medications:     allopurinoL (ZYLOPRIM) 100 MG tablet, Take 0.5 tablets (50 mg total) by mouth twice a week., Disp: 12 tablet, Rfl: 1    amLODIPine (NORVASC) 10 MG tablet, Take 1 tablet (10 mg total) by mouth once daily., Disp: 90 tablet, Rfl: 3    calcitRIOL (ROCALTROL) 0.25 MCG Cap, Take 1 capsule (0.25 mcg total) by mouth once daily., Disp: 30 capsule, Rfl: 3    calcium carbonate (TUMS) 200 mg calcium (500 mg) chewable tablet, Take 2 tablets (1,000 mg total) by mouth 3 (three) times daily with meals., Disp: 180 tablet, Rfl: 11    cholecalciferol, vitamin D3, (VITAMIN D3) 25 mcg (1,000 unit) capsule, Take 1 tablet by mouth once daily., Disp: , Rfl:     cloNIDine (CATAPRES) 0.3 MG tablet, Take 1 tablet (0.3 mg total) by mouth 3 (three) times daily., Disp: 270 tablet, Rfl: 1    furosemide (LASIX) 80 MG  "tablet, Take 1 tablet (80 mg total) by mouth 3 (three) times daily with meals., Disp: 90 tablet, Rfl: 11    hydrALAZINE (APRESOLINE) 100 MG tablet, Take 1 tablet (100 mg total) by mouth 3 (three) times daily., Disp: 90 tablet, Rfl: 11    isosorbide mononitrate (IMDUR) 120 MG 24 hr tablet, Take 1 tablet (120 mg total) by mouth once daily., Disp: 30 tablet, Rfl: 11    metoprolol succinate (TOPROL-XL) 50 MG 24 hr tablet, Take 3 tablets (150 mg total) by mouth once daily., Disp: 90 tablet, Rfl: 11    olmesartan (BENICAR) 40 MG tablet, Take 1 tablet (40 mg total) by mouth once daily., Disp: 90 tablet, Rfl: 1    sevelamer carbonate (RENVELA) 800 mg Tab, Take 2 tablets (1,600 mg total) by mouth 3 (three) times daily with meals., Disp: 180 tablet, Rfl: 11    Review of Systems   Constitutional:  Positive for fatigue.   Respiratory:  Positive for apnea.    Psychiatric/Behavioral:  Positive for sleep disturbance.      Objective:      BP (!) 142/90 (BP Location: Left arm, Patient Position: Sitting, BP Method: Large (Manual))   Pulse 78   Temp 99 °F (37.2 °C) (Oral)   Resp 18   Ht 5' 11" (1.803 m)   Wt (!) 137 kg (302 lb 0.5 oz)   SpO2 97%   BMI 42.12 kg/m²   Physical Exam  Vitals reviewed.   Constitutional:       General: He is not in acute distress.     Appearance: Normal appearance. He is obese. He is not ill-appearing, toxic-appearing or diaphoretic.   HENT:      Head: Normocephalic.      Right Ear: External ear normal.      Left Ear: External ear normal.      Nose: Nose normal. No congestion or rhinorrhea.      Mouth/Throat:      Mouth: Mucous membranes are moist.      Pharynx: Oropharynx is clear.   Eyes:      General: No scleral icterus.        Right eye: No discharge.         Left eye: No discharge.      Extraocular Movements: Extraocular movements intact.      Conjunctiva/sclera: Conjunctivae normal.   Cardiovascular:      Rate and Rhythm: Normal rate and regular rhythm.      Pulses: Normal pulses.      Heart " sounds: Normal heart sounds. No murmur heard.    No friction rub. No gallop.   Pulmonary:      Effort: Pulmonary effort is normal. No respiratory distress.      Breath sounds: Normal breath sounds. No wheezing, rhonchi or rales.   Chest:      Chest wall: No tenderness.   Musculoskeletal:         General: No swelling, tenderness or deformity. Normal range of motion.      Cervical back: Normal range of motion.      Right lower leg: No edema.      Left lower leg: No edema.   Skin:     General: Skin is warm and dry.      Capillary Refill: Capillary refill takes less than 2 seconds.      Coloration: Skin is not jaundiced.      Findings: No bruising, erythema, lesion or rash.   Neurological:      Mental Status: He is alert and oriented to person, place, and time.      Gait: Gait normal.   Psychiatric:         Mood and Affect: Mood normal.         Behavior: Behavior normal.         Thought Content: Thought content normal.         Judgment: Judgment normal.       Assessment:       1. Sleep apnea, unspecified type    2. Hypertensive heart and renal disease with congestive heart failure    3. Stage 5 chronic kidney disease not on chronic dialysis        Plan:       Return to work evaluation       -    Paperwork provided to Dr. Granado staff for completion. 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 He has his grandfather and then his father as plan B for receiving a kidney. States that sleep apnea treatment is holding up things.     Sleep apnea, unspecified type       -   Has follow up with pulmonology to address further.     Hypertensive heart and renal disease with congestive heart failure        -    Stable. Continue meds. Will continue to monitor.     Stage 5 chronic kidney disease not on chronic dialysis        -    As above.         Has follow up with Dr. Granado in June.            Manuel Hoffmann PA-C  Family Medicine Physician Assistant       I spent a  total of 30 minutes on the day of the visit.This includes face to face time and non-face to face time preparing to see the patient (eg, review of tests), obtaining and/or reviewing separately obtained history, documenting clinical information in the electronic or other health record, independently interpreting results and communicating results to the patient/family/caregiver, or care coordinator.      We have addressed [4] Moderate: 1 or more chronic illnesses with exacerbation, progression, or side effects of treatment / 2 or more stable chronic illnesses / 1 undiagnosed new problem with uncertain prognosis / 1 acute illness with systemic symptoms / 1 acute complicated injury  The complexity of the data reviewed and analyzed for this visit was [2] Minimal or None  The risk of complications and/or morbidity or mortality are [4] Moderate risk (I.e. prescription drug management / decision regarding minor surgery with identified pt or procedure risk factors / decision regarding elective major surgery without identified pt or procedure risk factors / diagnosis or treatment significantly limited by social determinants of health)   The level of Medical Decision Making for this visit is [4] Moderate

## 2023-04-19 NOTE — TELEPHONE ENCOUNTER
Patient notified paperwork was faxed. He would like to come  a copy. Can you notify him when it is ready to be picked up? Thank you

## 2023-04-19 NOTE — TELEPHONE ENCOUNTER
Spoke with pt. Notified pt I made a copy of his paper work and he can  at his convenience. Max IBRAHIM.

## 2023-04-20 ENCOUNTER — PATIENT MESSAGE (OUTPATIENT)
Dept: PULMONOLOGY | Facility: CLINIC | Age: 43
End: 2023-04-20
Payer: COMMERCIAL

## 2023-05-01 ENCOUNTER — PATIENT MESSAGE (OUTPATIENT)
Dept: PULMONOLOGY | Facility: CLINIC | Age: 43
End: 2023-05-01
Payer: COMMERCIAL

## 2023-05-02 DIAGNOSIS — G47.33 OSA (OBSTRUCTIVE SLEEP APNEA): Primary | ICD-10-CM

## 2023-05-19 ENCOUNTER — LAB VISIT (OUTPATIENT)
Dept: LAB | Facility: HOSPITAL | Age: 43
End: 2023-05-19
Attending: INTERNAL MEDICINE
Payer: COMMERCIAL

## 2023-05-19 DIAGNOSIS — I13.0 HYPERTENSIVE HEART AND RENAL DISEASE WITH CONGESTIVE HEART FAILURE: ICD-10-CM

## 2023-05-19 DIAGNOSIS — E21.3 HYPERPARATHYROIDISM: ICD-10-CM

## 2023-05-19 DIAGNOSIS — E66.01 SEVERE OBESITY WITH BODY MASS INDEX (BMI) OF 35.0 TO 39.9 WITH COMORBIDITY: ICD-10-CM

## 2023-05-19 DIAGNOSIS — N18.5 STAGE 5 CHRONIC KIDNEY DISEASE NOT ON CHRONIC DIALYSIS: ICD-10-CM

## 2023-05-19 DIAGNOSIS — I27.20 PULMONARY HYPERTENSION: ICD-10-CM

## 2023-05-19 DIAGNOSIS — D63.1 ANEMIA DUE TO STAGE 5 CHRONIC KIDNEY DISEASE, NOT ON CHRONIC DIALYSIS: ICD-10-CM

## 2023-05-19 DIAGNOSIS — R80.1 PERSISTENT PROTEINURIA: ICD-10-CM

## 2023-05-19 DIAGNOSIS — N18.5 ANEMIA DUE TO STAGE 5 CHRONIC KIDNEY DISEASE, NOT ON CHRONIC DIALYSIS: ICD-10-CM

## 2023-05-19 LAB
ALBUMIN SERPL BCP-MCNC: 3.4 G/DL (ref 3.5–5.2)
ANION GAP SERPL CALC-SCNC: 12 MMOL/L (ref 8–16)
BASOPHILS # BLD AUTO: 0.02 K/UL (ref 0–0.2)
BASOPHILS NFR BLD: 0.4 % (ref 0–1.9)
BUN SERPL-MCNC: 55 MG/DL (ref 6–20)
CALCIUM SERPL-MCNC: 8 MG/DL (ref 8.7–10.5)
CHLORIDE SERPL-SCNC: 106 MMOL/L (ref 95–110)
CO2 SERPL-SCNC: 21 MMOL/L (ref 23–29)
CREAT SERPL-MCNC: 8.3 MG/DL (ref 0.5–1.4)
DIFFERENTIAL METHOD: ABNORMAL
EOSINOPHIL # BLD AUTO: 0.1 K/UL (ref 0–0.5)
EOSINOPHIL NFR BLD: 2.2 % (ref 0–8)
ERYTHROCYTE [DISTWIDTH] IN BLOOD BY AUTOMATED COUNT: 13.9 % (ref 11.5–14.5)
EST. GFR  (NO RACE VARIABLE): 7.7 ML/MIN/1.73 M^2
GLUCOSE SERPL-MCNC: 120 MG/DL (ref 70–110)
HCT VFR BLD AUTO: 32.4 % (ref 40–54)
HGB BLD-MCNC: 9.9 G/DL (ref 14–18)
IMM GRANULOCYTES # BLD AUTO: 0.01 K/UL (ref 0–0.04)
IMM GRANULOCYTES NFR BLD AUTO: 0.2 % (ref 0–0.5)
LYMPHOCYTES # BLD AUTO: 1.3 K/UL (ref 1–4.8)
LYMPHOCYTES NFR BLD: 25.4 % (ref 18–48)
MCH RBC QN AUTO: 29.6 PG (ref 27–31)
MCHC RBC AUTO-ENTMCNC: 30.6 G/DL (ref 32–36)
MCV RBC AUTO: 97 FL (ref 82–98)
MONOCYTES # BLD AUTO: 0.7 K/UL (ref 0.3–1)
MONOCYTES NFR BLD: 13 % (ref 4–15)
NEUTROPHILS # BLD AUTO: 3 K/UL (ref 1.8–7.7)
NEUTROPHILS NFR BLD: 58.8 % (ref 38–73)
NRBC BLD-RTO: 0 /100 WBC
PHOSPHATE SERPL-MCNC: 5 MG/DL (ref 2.7–4.5)
PLATELET # BLD AUTO: 260 K/UL (ref 150–450)
PMV BLD AUTO: 10.3 FL (ref 9.2–12.9)
POTASSIUM SERPL-SCNC: 4.3 MMOL/L (ref 3.5–5.1)
PTH-INTACT SERPL-MCNC: 335.8 PG/ML (ref 9–77)
RBC # BLD AUTO: 3.35 M/UL (ref 4.6–6.2)
SODIUM SERPL-SCNC: 139 MMOL/L (ref 136–145)
URATE SERPL-MCNC: 12 MG/DL (ref 3.4–7)
WBC # BLD AUTO: 5.07 K/UL (ref 3.9–12.7)

## 2023-05-19 PROCEDURE — 83970 ASSAY OF PARATHORMONE: CPT | Mod: TXP | Performed by: INTERNAL MEDICINE

## 2023-05-19 PROCEDURE — 80069 RENAL FUNCTION PANEL: CPT | Mod: TXP | Performed by: INTERNAL MEDICINE

## 2023-05-19 PROCEDURE — 85025 COMPLETE CBC W/AUTO DIFF WBC: CPT | Mod: TXP | Performed by: INTERNAL MEDICINE

## 2023-05-19 PROCEDURE — 36415 COLL VENOUS BLD VENIPUNCTURE: CPT | Mod: PO,NTX | Performed by: INTERNAL MEDICINE

## 2023-05-19 PROCEDURE — 84550 ASSAY OF BLOOD/URIC ACID: CPT | Mod: NTX | Performed by: INTERNAL MEDICINE

## 2023-05-25 ENCOUNTER — OFFICE VISIT (OUTPATIENT)
Dept: UROLOGY | Facility: CLINIC | Age: 43
End: 2023-05-25
Payer: COMMERCIAL

## 2023-05-25 VITALS
HEART RATE: 71 BPM | SYSTOLIC BLOOD PRESSURE: 176 MMHG | DIASTOLIC BLOOD PRESSURE: 101 MMHG | HEIGHT: 74 IN | WEIGHT: 302 LBS | BODY MASS INDEX: 38.76 KG/M2

## 2023-05-25 DIAGNOSIS — N18.5 STAGE 5 CHRONIC KIDNEY DISEASE NOT ON CHRONIC DIALYSIS: ICD-10-CM

## 2023-05-25 DIAGNOSIS — R35.0 URINARY FREQUENCY: ICD-10-CM

## 2023-05-25 DIAGNOSIS — N52.9 ERECTILE DYSFUNCTION, UNSPECIFIED ERECTILE DYSFUNCTION TYPE: Primary | ICD-10-CM

## 2023-05-25 PROCEDURE — 3008F BODY MASS INDEX DOCD: CPT | Mod: CPTII,NTX,S$GLB, | Performed by: NURSE PRACTITIONER

## 2023-05-25 PROCEDURE — 4010F PR ACE/ARB THEARPY RXD/TAKEN: ICD-10-PCS | Mod: CPTII,NTX,S$GLB, | Performed by: NURSE PRACTITIONER

## 2023-05-25 PROCEDURE — 99999 PR PBB SHADOW E&M-EST. PATIENT-LVL III: CPT | Mod: PBBFAC,TXP,, | Performed by: NURSE PRACTITIONER

## 2023-05-25 PROCEDURE — 3066F NEPHROPATHY DOC TX: CPT | Mod: CPTII,NTX,S$GLB, | Performed by: NURSE PRACTITIONER

## 2023-05-25 PROCEDURE — 99213 OFFICE O/P EST LOW 20 MIN: CPT | Mod: NTX,S$GLB,, | Performed by: NURSE PRACTITIONER

## 2023-05-25 PROCEDURE — 1159F PR MEDICATION LIST DOCUMENTED IN MEDICAL RECORD: ICD-10-PCS | Mod: CPTII,NTX,S$GLB, | Performed by: NURSE PRACTITIONER

## 2023-05-25 PROCEDURE — 3066F PR DOCUMENTATION OF TREATMENT FOR NEPHROPATHY: ICD-10-PCS | Mod: CPTII,NTX,S$GLB, | Performed by: NURSE PRACTITIONER

## 2023-05-25 PROCEDURE — 99999 PR PBB SHADOW E&M-EST. PATIENT-LVL III: ICD-10-PCS | Mod: PBBFAC,TXP,, | Performed by: NURSE PRACTITIONER

## 2023-05-25 PROCEDURE — 3080F PR MOST RECENT DIASTOLIC BLOOD PRESSURE >= 90 MM HG: ICD-10-PCS | Mod: CPTII,NTX,S$GLB, | Performed by: NURSE PRACTITIONER

## 2023-05-25 PROCEDURE — 1159F MED LIST DOCD IN RCRD: CPT | Mod: CPTII,NTX,S$GLB, | Performed by: NURSE PRACTITIONER

## 2023-05-25 PROCEDURE — 3077F PR MOST RECENT SYSTOLIC BLOOD PRESSURE >= 140 MM HG: ICD-10-PCS | Mod: CPTII,NTX,S$GLB, | Performed by: NURSE PRACTITIONER

## 2023-05-25 PROCEDURE — 3008F PR BODY MASS INDEX (BMI) DOCUMENTED: ICD-10-PCS | Mod: CPTII,NTX,S$GLB, | Performed by: NURSE PRACTITIONER

## 2023-05-25 PROCEDURE — 99213 PR OFFICE/OUTPT VISIT, EST, LEVL III, 20-29 MIN: ICD-10-PCS | Mod: NTX,S$GLB,, | Performed by: NURSE PRACTITIONER

## 2023-05-25 PROCEDURE — 4010F ACE/ARB THERAPY RXD/TAKEN: CPT | Mod: CPTII,NTX,S$GLB, | Performed by: NURSE PRACTITIONER

## 2023-05-25 PROCEDURE — 3077F SYST BP >= 140 MM HG: CPT | Mod: CPTII,NTX,S$GLB, | Performed by: NURSE PRACTITIONER

## 2023-05-25 PROCEDURE — 3080F DIAST BP >= 90 MM HG: CPT | Mod: CPTII,NTX,S$GLB, | Performed by: NURSE PRACTITIONER

## 2023-05-25 PROCEDURE — 1160F RVW MEDS BY RX/DR IN RCRD: CPT | Mod: CPTII,NTX,S$GLB, | Performed by: NURSE PRACTITIONER

## 2023-05-25 PROCEDURE — 1160F PR REVIEW ALL MEDS BY PRESCRIBER/CLIN PHARMACIST DOCUMENTED: ICD-10-PCS | Mod: CPTII,NTX,S$GLB, | Performed by: NURSE PRACTITIONER

## 2023-05-25 NOTE — PROGRESS NOTES
[facility] Note  Staff: LATASHA GenaoC    PCP: MD Loy    Chief Complaint: F/UP-Urinary Frequency and ED    Subjective:        HPI: João Ham is a 42 y.o. male presents today for f/up at this time.    This pt was evaluated once by me on 23 for urinary frequency and erectile dysfunction.  This pt is currently still awaiting for kidney transplant at this time.    OV 23:  UA in office today showed--Pt was unable to give urine sample in office.  Pt denies gross hematuria or dysuria at this time.  ++Increased urinary frequency problems and erectile dysfunction issues.  Pt was recently started on Flomax 0.4 mg daily from his PCP office.  Recent PSA is normal.     Recent hx of CKD, Stage 5, uncontrolled HBP, and CHF!  Pt currently taking Lasix 80 mg TID since hospital admission last year.  AUA SS:  14/4  Feeling of ICBE:  3  Frequency:2  Intermittency:0  Urgency:4  Weak urine stream:0  Strainin  Nocturia:5  PVR by bladder scan performed by MA today:  81 mL     VERONICA Score:  10  1.1  2.2  3.2  4.3  5.2     Recent Lab Results done on 2023:  Renal Function Panel:  BUN/Cr-67/6.8  Calcium 8.3  eGFR of 9.7!!!!  PSA level WNL    *AFTER LAST OV WITH ME:  It was thoroughly explained to pt during last OV, that his urinary frequency is most likely due to the Lasix 80 mg TID as well as new RX of Flomax daily at this time.  Advised pt we would not be proceeding further with any treatment of ED issues until other medical conditions have been evaluated and treated by PCP and Nephrology in the near future.  We advised pt last ov to STOP the Flomax.     OV 23:  Today, in reviewing pt's recent lab results looks like worsening BUN/Cr, Glucose results.  Pt's scheduling for kidney transplant has been placed on hold until his sleep apnea treatment has been completed.  He is hoping for transplant by the end of the summer.  Since last ov with me, he has not returned to see his  cardiologist-Dr. Dawson Garsia for re-evaluation.  Pt remains with urinary frequency and erectile dysfunction issues.  He has had no med changes since last ov with me.    REVIEW OF SYSTEMS:  A comprehensive 10 system review was performed and is negative except as noted above in HPI    PMHx:  Past Medical History:   Diagnosis Date    Allergy     Anemia     Anxiety     CHF (congestive heart failure)     Depression     High blood pressure with chronic kidney disease, stage 5 chronic kidney disease or end stage renal disease     Hypertension      PSHx:  Past Surgical History:   Procedure Laterality Date    HERNIA REPAIR Right     With mesh     Allergies:  Mushroom    Medications: reviewed   Objective:     Vitals:    05/25/23 1012   BP: (!) 176/101   Pulse: 71     General:WDWN in NAD  Eyes: PERRLA, normal conjunctiva  Respiratory: no increased work on breathing, clear to auscultation  Cardiovascular: regular rate and rhythm. No obvious extremity edema.  GI: palpation of masses. No tenderness. No hepatosplenomegaly to palpation.  Musculoskeletal: normal range of motion of bilateral upper extremities. Normal muscle strength and tone.  Skin: no obvious rashes or lesions. No tightening of skin noted.  Neurologic: CN grossly normal. Normal sensation.   Psychiatric: awake, alert and oriented x 3. Mood and affect normal. Cooperative.      Assessment:       1. Erectile dysfunction, unspecified erectile dysfunction type    2. Stage 5 chronic kidney disease not on chronic dialysis    3. Urinary frequency          Plan:     It was thoroughly explained to pt again upon conclusion of ov today that until his other medical issues are addressed, we are unable to prescribe and/or treat his ED and or LUTS until other medication issues were addressed and treated.     Advised pt we would not be proceeding further with any treatment of ED issues until other medical conditions have been evaluated and treated by PCP, Cardiology and Nephrology  in the near future.    F/u with MD in 6-8 months.  At that time he should be S/P kidney transplant recipient at that time.  And Urologist can assess and see at that time if pt is candidate for any further treatment with meds.  Pt verbalized understanding.    [facility]: Active    Noa Hunt, KEISHA-C

## 2023-05-26 ENCOUNTER — OFFICE VISIT (OUTPATIENT)
Dept: NEPHROLOGY | Facility: CLINIC | Age: 43
End: 2023-05-26
Payer: COMMERCIAL

## 2023-05-26 DIAGNOSIS — G47.30 SLEEP APNEA, UNSPECIFIED TYPE: ICD-10-CM

## 2023-05-26 DIAGNOSIS — N18.5 ANEMIA DUE TO STAGE 5 CHRONIC KIDNEY DISEASE, NOT ON CHRONIC DIALYSIS: ICD-10-CM

## 2023-05-26 DIAGNOSIS — E66.01 SEVERE OBESITY WITH BODY MASS INDEX (BMI) OF 35.0 TO 39.9 WITH COMORBIDITY: ICD-10-CM

## 2023-05-26 DIAGNOSIS — E21.3 HYPERPARATHYROIDISM: ICD-10-CM

## 2023-05-26 DIAGNOSIS — I27.20 PULMONARY HYPERTENSION: Primary | ICD-10-CM

## 2023-05-26 DIAGNOSIS — R80.1 PERSISTENT PROTEINURIA: ICD-10-CM

## 2023-05-26 DIAGNOSIS — I13.0 HYPERTENSIVE HEART AND RENAL DISEASE WITH CONGESTIVE HEART FAILURE: ICD-10-CM

## 2023-05-26 DIAGNOSIS — N18.5 STAGE 5 CHRONIC KIDNEY DISEASE NOT ON CHRONIC DIALYSIS: ICD-10-CM

## 2023-05-26 DIAGNOSIS — D63.1 ANEMIA DUE TO STAGE 5 CHRONIC KIDNEY DISEASE, NOT ON CHRONIC DIALYSIS: ICD-10-CM

## 2023-05-26 PROCEDURE — 3066F NEPHROPATHY DOC TX: CPT | Mod: CPTII,95,, | Performed by: INTERNAL MEDICINE

## 2023-05-26 PROCEDURE — 1159F PR MEDICATION LIST DOCUMENTED IN MEDICAL RECORD: ICD-10-PCS | Mod: CPTII,95,, | Performed by: INTERNAL MEDICINE

## 2023-05-26 PROCEDURE — 3066F PR DOCUMENTATION OF TREATMENT FOR NEPHROPATHY: ICD-10-PCS | Mod: CPTII,95,, | Performed by: INTERNAL MEDICINE

## 2023-05-26 PROCEDURE — 4010F ACE/ARB THERAPY RXD/TAKEN: CPT | Mod: CPTII,95,, | Performed by: INTERNAL MEDICINE

## 2023-05-26 PROCEDURE — 4010F PR ACE/ARB THEARPY RXD/TAKEN: ICD-10-PCS | Mod: CPTII,95,, | Performed by: INTERNAL MEDICINE

## 2023-05-26 PROCEDURE — 1159F MED LIST DOCD IN RCRD: CPT | Mod: CPTII,95,, | Performed by: INTERNAL MEDICINE

## 2023-05-26 PROCEDURE — 99214 PR OFFICE/OUTPT VISIT, EST, LEVL IV, 30-39 MIN: ICD-10-PCS | Mod: 95,,, | Performed by: INTERNAL MEDICINE

## 2023-05-26 PROCEDURE — 99214 OFFICE O/P EST MOD 30 MIN: CPT | Mod: 95,,, | Performed by: INTERNAL MEDICINE

## 2023-05-26 PROCEDURE — 1160F RVW MEDS BY RX/DR IN RCRD: CPT | Mod: CPTII,95,, | Performed by: INTERNAL MEDICINE

## 2023-05-26 PROCEDURE — 1160F PR REVIEW ALL MEDS BY PRESCRIBER/CLIN PHARMACIST DOCUMENTED: ICD-10-PCS | Mod: CPTII,95,, | Performed by: INTERNAL MEDICINE

## 2023-05-26 NOTE — PROGRESS NOTES
The patient location is: [place]  The chief complaint leading to consultation is: CKD    Visit type: audio only    Face to Face time with patient: 10 min  20 minutes of total time spent on the encounter, which includes face to face time and non-face to face time preparing to see the patient (eg, review of tests), Obtaining and/or reviewing separately obtained history, Documenting clinical information in the electronic or other health record, Independently interpreting results (not separately reported) and communicating results to the patient/family/caregiver, or Care coordination (not separately reported).         Each patient to whom he or she provides medical services by telemedicine is:  (1) informed of the relationship between the physician and patient and the respective role of any other health care provider with respect to management of the patient; and (2) notified that he or she may decline to receive medical services by telemedicine and may withdraw from such care at any time.    Notes:     Subjective:       Patient ID: João Ham is a 42 y.o. Black or  male who presents for follow up on CKD.     Since last seen at nephrology clinic, João Ham run out of medications for few days.  No uremic symptoms, not volume overloaded.    Reports taking their medications on time, without missed doses. As to their chronic conditions:  - CKD: Denies use of NSAIDs.   2022: baseline sr cr of 7 - 7.5 mg/dL with UPCR 1.9   2023: baseline sr cr of 7-8 mg/dL with UPCR 1.1  - HTN: controlled, follows low salt diet. Denies uncontrolled HTN, dizziness/lightheadedness or hypotension/syncopal episodes.  - dHF, echo in 2022: Left ventricle is normal in size with mild concentric hypertrophy and normal systolic function. Grade III left ventricular  diastolic dysfunction. Moderate right ventricular enlargement with mildly to moderately reduced right ventricular systolic function. Severe left atrial enlargement. There is  moderate pulmonary hypertension.   - Transplant evaluation on going: needs dental and cardiac clearance.    Review of Systems   Constitutional:  Negative for appetite change, chills and fever.   HENT:  Negative for congestion.    Eyes:  Negative for visual disturbance.   Respiratory:  Negative for cough and shortness of breath.    Cardiovascular:  Negative for chest pain and leg swelling.   Gastrointestinal:  Negative for abdominal pain, diarrhea, nausea and vomiting.   Genitourinary:  Negative for difficulty urinating, dysuria and hematuria.   Musculoskeletal:  Negative for myalgias.   Skin:  Negative for rash.   Neurological:  Negative for headaches.   Psychiatric/Behavioral:  Negative for sleep disturbance.      The past medical, family and social histories were reviewed for this encounter.     There were no vitals taken for this visit.    Objective:      Physical Exam  Vitals reviewed.   Constitutional:       General: He is not in acute distress.     Appearance: He is well-developed.   HENT:      Head: Normocephalic and atraumatic.   Eyes:      General: No scleral icterus.  Pulmonary:      Effort: Pulmonary effort is normal. No respiratory distress.   Neurological:      Mental Status: He is alert and oriented to person, place, and time.   Psychiatric:         Mood and Affect: Mood normal.         Behavior: Behavior normal.       Assessment:       1. Pulmonary hypertension    2. Hypertensive heart and renal disease with congestive heart failure    3. Stage 5 chronic kidney disease not on chronic dialysis    4. Persistent proteinuria    5. Anemia due to stage 5 chronic kidney disease, not on chronic dialysis    6. Severe obesity with body mass index (BMI) of 35.0 to 39.9 with comorbidity    7. Hyperparathyroidism    8. 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0 (L) 05/19/2023    CAION 1.10 10/18/2022    PHOS 5.0 (H) 05/19/2023    - Cont Vit D and phos binders    pHTN and HF   - On lasix, he refilled his meds    Obesity    - Diet and exercise    Med changes: none

## 2023-06-06 ENCOUNTER — TELEPHONE (OUTPATIENT)
Dept: TRANSPLANT | Facility: CLINIC | Age: 43
End: 2023-06-06
Payer: COMMERCIAL

## 2023-06-06 ENCOUNTER — PROCEDURE VISIT (OUTPATIENT)
Dept: SLEEP MEDICINE | Facility: HOSPITAL | Age: 43
End: 2023-06-06
Attending: NURSE PRACTITIONER
Payer: COMMERCIAL

## 2023-06-06 ENCOUNTER — EPISODE CHANGES (OUTPATIENT)
Dept: TRANSPLANT | Facility: CLINIC | Age: 43
End: 2023-06-06

## 2023-06-06 DIAGNOSIS — G47.33 OSA (OBSTRUCTIVE SLEEP APNEA): ICD-10-CM

## 2023-06-06 DIAGNOSIS — Z76.82 ORGAN TRANSPLANT CANDIDATE: Primary | ICD-10-CM

## 2023-06-06 PROCEDURE — 95811 POLYSOM 6/>YRS CPAP 4/> PARM: CPT

## 2023-06-06 NOTE — TELEPHONE ENCOUNTER
"  KIDNEY WAIT LISTING NOTE    Date of Financial clearance to list: 23    SSN/[facility]:     Organ: Kidney    Last Name: Jitendra  First Name: João    : 1980       Gender: male        MRN#: [id number]                                   State of Permanent Residence: [address] [place] [id number]    Ethnicity: Not  or /a   Race:      Black or     CLINICAL INFORMATION   Candidate Medical Urgency Status: Active (1)  Number of Previous Kidney Transplants: 0  Number of Previous Solid Organ Transplants: 0  Did you enter number of previous kidney or other solid organ transplants? Yes  Is this Candidate a Prior Living Donor: No  (If yes, please generate letter to [employer] with patient's date of donation, recipient SSN, signed by Surgical Director after patient is listed in order to receive priority points).      ABO  ABO Blood Group:   A POS     ABO Confirmation: (THESE DATES MUST BE PRIOR TO THE LIST DATE AND SUPPORTED BY SEPARATE LAB REPORTS)    Internal Results    Lab Results   Component Value Date    GROUPTRH A POS 2023    GROUPTRH A POS 2023     No results found for: ABO    External Results    ABO Date 1:    ABO Date 2  Are either of these ABO results based on External Labs? No  (If Yes, STOP and go to source document in Media Tab for verification).    VITALS  Height:  6'0"  Weight:  292  (Use height from Transplant clinic visits only).  Did you enter height/weight? Yes    HLA    Class I:  Lab Results   Component Value Date    MPWQ9II 30 2023    TTPR6OT 74 2023    CZPO2FF 72 2023    LXHO0XG 42 2023    SOVPV8OG 6 2023    TOBZT4ML XX 2023    HPOZA0WS 2 2023    LUKGB1TR 17 2023       Class II:  Lab Results   Component Value Date    DFEITC24OC 11 2023    ZWQZNO54NV 14 2023    GLRINS349IK 52 2023    WKDHGZ7631 XX 2023    NGLCC7SR 7 2023    XSLSW9MN 5 2023       Tested for HLA " "Antibodies: Yes, antibodies detected     If result is "Positive" antibodies are detected     If result is "Negative or questionable" no antibodies detected    Lab Results   Component Value Date    CIPRAS Positive 02/02/2023    CIIPRAS Negative 02/02/2023       DIALYSIS INFORMATION  Is patient Pre-Dialysis: Yes     GFR Information  Report GFR being used as the criteria for placement on the kidney list. If not, leave blank  GFR < or = 20 ml/min? Yes  If Yes, Specify value  _7.5__   ml/min     Initial date GFR became 20 or less: 2/2/23  Is GFR obtained from an Outside lab Result? No  (If YES verify with source document scanned into media)    If patient on Dialysis:    Is candidate currently on dialysis for ESRD? No  If Yes,  Date Chronic Dialysis Started:   (Not currently on dialysis)  (verify with source document in Media Tab)   Dialysis Unit Name: (Not currently on dialysis)                        Physician Name:  Dr. Charlette Valladares  NPI#: [id number]    DIABETES INFORMATION  Primary Native Kidney Diagnosis: Malignant Hypertension  C-Peptide Value - No results found for: CPEPTIDE  Current Diabetes Status: None    FOR NON-KIDNEY DEPARTMENT USE ONLY:  Additional Organs Registered? none    Maximum Acceptable Number of HLA Mismatches  ABDR:     6      (0-6)               AB:               (0-4)  ADR:   _____  (0-4)              BDR: _____ (0-4)  A:        _____  (0-2)              B:      _____ (0-2)          DR: ______ (0-2)    Will Recipient Accept?   Accept HBcAB Positive Organ:            Yes  Accept HBV SABRA Positive Organ:        No  Accept HCV Antibody Positive Organ: Yes   Accept HCV SABRA Positive Organ: Yes    Dual Kidney and En Bloc Opt In : No  Dual  Local:   No  Dual Import:   No  En Bloc Local:   No  En Bloc Import: No     Accept KDPI > 85: Single: No     Local: No     Import: No  Accept KDPI > 85: Dual: No     Local: No     Import: No    ### NURSE TO VERIFY CONSENT AND MAKE ANY NECESSARY CHANGES NEEDED IN " UNET AT THE TIME OF VERIFICATION ###    Unacceptible Antigens  If yes, list     Lab Results   Component Value Date    BS3KNVN A34,B76 02/02/2023    CIABCLM WEAK----B75 02/02/2023    CIIAB Negative 02/02/2023       ### DO NOT LIST IF ANTIGEN VALUE WEAK ###    Hep B Vaccine series completed: yes    Blood Type x2, all serologies, and eGFR were verified by myself and Myrna Monge.  Neville Salomon will be new coordinator.   Blood type determination and reporting was completed according to the programs protocols and OPTN requirements.

## 2023-06-06 NOTE — TELEPHONE ENCOUNTER
Patient informed financial clearance received, and he is now able to be listed on the kidney transplant wait list. Patient's most recent documented weight is 302 lbs on 5/25/23, which results in a BMI of 41.  Patient states he was out of his fluid pills X 3 weeks due to financial complications. Reports he followed up with his nephrologist, and his nephrologist was able to assist him with resources. He is now back on his diuretic, and his current weight is 292 lbs, which results in a BMI of 39.6. Patient also reports he has recently joined a gym and is continuing to work on his weight loss.     Patient confirms he is pre-dialysis. His address, phone number, and nephrologist are listed correctly in our system. All questions answered.

## 2023-06-06 NOTE — LETTER
2023    KENTON Ham  [address] [facility] [address] [facility] [address]    Dear KENTON Ham:  MRN: [id number]    Congratulations! On 2023, you were placed on  the waiting list for a  donor transplant.    Your candidacy for kidney transplant is based on the following criteria: Chronic Kidney Disease Stage V due to Malignant Hypertension.    Your transplant coordinator while on the waiting list is Neville Salomon RN. They can be reached at [phone] or [phone] with any questions.      What to do now?    Ask your living donors to call and begin testing  Give your donors our phone number, [phone]  Make sure your donors have your name and date of birth when they call  You will get transplanted much faster if you have a living donor    Have your blood sent to our Transplant Lab every month  If you are on dialysis - our Transplant Lab will work with your dialysis unit to send your blood every month  If you are not on dialysis   If you live near an [facility] lab, we will schedule you to have blood drawn every month  If you do not live near an [facility] lab, you will be sent blood kits in the mail. You will need to take a kit to your local lab or doctor to have your blood drawn every month and mail to the Transplant Lab.     Call us with ANY CHANGES  Phone numbers - we must be able to reach you anytime of the day or night when a kidney is available  Address  Insurance coverage  Dialysis unit or kidney doctor  Dru: if you have surgery, stay in the hospital, have to get blood, or have an infection    Review your Kidney/Kidney-Pancreas Transplant Guide   This will give you detailed information about what happens when  you are on the waiting list   you are called when a kidney is available    The [facility] has a transplant surgeon and physician available 365 days a year, 24 hours a day to coordinate organ acceptance, procurement, surgical placement and to address urgent patient  care issues.  You will be notified in writing of any changes to our Transplant Centers staffing plan that would impact your ability to receive a transplant.    Attached is a letter from the [employer] for Organ Sharing (UN[employer]). It describes the services and information offered to patients by [employer] and the Organ Procurement and Transplant Network. We look forward to working with you while on the waiting list.     We would like to inform you of an important OPTN (Organ Procurement and Transplant Network) Policy change that may affect the waiting time for some candidates on our waiting list.     Waiting time is important in identifying who receives offers for kidneys. A long wait time may increase your chance of getting an offer. Wait time is based on a test called eGFR that tells how well your kidneys are working. Wait time could also be based on how long you have been on dialysis. Government and health officials have changed the way this test is used. Before this year, hospitals used an eGFR that would include your race. For Black or  Americans, this eGFR could have shown that their kidneys were working better than they were.    Because of this change, we are looking at everyones record and assessing waiting time for people who are eligible. We will be reviewing everyones medical records and will contact you if you are eligible.     Who can I talk to if I have a question?  You can contact us if you have questions or send a message through [facility].     Please give us time to answer your questions. We are working on this for many patients.    How can I learn more about eGFR and this policy change?  Go to OPTN website > Patients > Kidney > FAQ: Understanding race-neutral eGFR calculations  Full URL: https://optn.transplant.hrsa.gov/patients/by-organ/kidney/understanding-the-proposal-to-require-race-neutral-egfr-calculations/  Call the Organ Procurement and Transplantation Network (OPTN) toll-free patient  services line at [phone]    Congratulations,    Your Transplant Partner  [facility]   [facility] [place] [address]  [phone]     Cc: Willy Hanna, DO                                                            The Organ Procurement and Transplantation Network   Toll-free patient services line: Your resource for organ transplant information     If you have a question regarding your own medical care, you always should call your transplant hospital first. However, for general organ transplant-related information, you can call the Organ Procurement and Transplantation Network (OPTN) toll-free patient services line at [phone].     Anyone, including potential transplant candidates, candidates, recipients, family members, friends, living donors, and donor family members, can call this number to:     Talk about organ donation, living donation, the transplant process, the donation process, and transplant policies.   Get a free patient information kit with helpful booklets, waiting list and transplant information, and a list of all transplant hospitals.   Ask questions about the OPTN website (https://optn.transplant.hrsa.gov/), the [employer] for Organ Sharings (UNOS) website (https://unos.org/), or the UNOS website for living donors and transplant recipients. (https://www.transplantliving.org/).   Learn how the OPTN can help you.   Talk about any concerns that you may have with a transplant hospital.     The nations transplant system, the OPTN, is managed under federal contract by the [employer] for Organ Sharing (UNOS), which is a non-profit charitable organization. The OPTN helps create and define organ sharing policies that make the best use of donated organs. This process continuously evaluating new advances and discoveries so policies can be adapted to best serve patients waiting for transplants. To do so, the OPTN works closely with  transplant professionals, transplant patients, transplant candidates, donor families, living donors, and the public. All transplant programs and organ procurement organizations throughout the country are OPTN members and are obligated to follow the policies the OPTN creates for allocating organs.     The OPTN also is responsible for:   Providing educational material for patients, the public, and professionals.   Raising awareness of the need for donated organs and tissue.   Coordinating organ procurement, matching, and placement.   Collecting information about every organ transplant and donation that occurs in the [place].     Remember, you should contact your transplant hospital directly if you have questions or concerns about your own medical care including medical records, work-up progress, and test results.     We are not your transplant hospital, and our staff will not be able to answer questions about your case, so please keep your transplant hospitals phone number handy.   However, while you research your transplant needs and learn as much as you can about transplantation and donation, we welcome your call to our toll-free patient services line at [phone].

## 2023-06-07 ENCOUNTER — TELEPHONE (OUTPATIENT)
Dept: TRANSPLANT | Facility: CLINIC | Age: 43
End: 2023-06-07
Payer: COMMERCIAL

## 2023-06-07 DIAGNOSIS — Z76.82 ORGAN TRANSPLANT CANDIDATE: Primary | ICD-10-CM

## 2023-06-12 DIAGNOSIS — G47.33 OSA (OBSTRUCTIVE SLEEP APNEA): Primary | ICD-10-CM

## 2023-06-15 ENCOUNTER — TELEPHONE (OUTPATIENT)
Dept: PULMONOLOGY | Facility: CLINIC | Age: 43
End: 2023-06-15
Payer: COMMERCIAL

## 2023-06-15 DIAGNOSIS — E55.9 VITAMIN D DEFICIENCY: ICD-10-CM

## 2023-06-15 RX ORDER — CALCITRIOL 0.25 UG/1
CAPSULE ORAL
Qty: 90 CAPSULE | Refills: 1 | Status: SHIPPED | OUTPATIENT
Start: 2023-06-15

## 2023-06-15 NOTE — TELEPHONE ENCOUNTER
Will put in refill should not be taking vitamin-D with calcitriol he should just be taking calcitriol

## 2023-06-15 NOTE — TELEPHONE ENCOUNTER
----- Message from Carisa Pedroza NP sent at 6/12/2023  2:23 PM CDT -----  Regarding: Bipap  Please call patient and inform him that CPAP Titration study shows a need for Bipap therapy. I have placed the order for Bipap into [facility]. He should hear from [facility] in regards to getting machine. He will need a three month follow up for compliance.

## 2023-06-19 ENCOUNTER — LAB VISIT (OUTPATIENT)
Dept: LAB | Facility: HOSPITAL | Age: 43
End: 2023-06-19
Attending: INTERNAL MEDICINE
Payer: COMMERCIAL

## 2023-06-19 DIAGNOSIS — Z76.82 ORGAN TRANSPLANT CANDIDATE: ICD-10-CM

## 2023-06-19 DIAGNOSIS — I13.0 HYPERTENSIVE HEART AND RENAL DISEASE WITH CONGESTIVE HEART FAILURE: ICD-10-CM

## 2023-06-19 DIAGNOSIS — R80.1 PERSISTENT PROTEINURIA: ICD-10-CM

## 2023-06-19 DIAGNOSIS — D63.1 ANEMIA DUE TO STAGE 5 CHRONIC KIDNEY DISEASE, NOT ON CHRONIC DIALYSIS: ICD-10-CM

## 2023-06-19 DIAGNOSIS — N18.5 STAGE 5 CHRONIC KIDNEY DISEASE NOT ON CHRONIC DIALYSIS: ICD-10-CM

## 2023-06-19 DIAGNOSIS — E66.01 SEVERE OBESITY WITH BODY MASS INDEX (BMI) OF 35.0 TO 39.9 WITH COMORBIDITY: ICD-10-CM

## 2023-06-19 DIAGNOSIS — E21.3 HYPERPARATHYROIDISM: ICD-10-CM

## 2023-06-19 DIAGNOSIS — I27.20 PULMONARY HYPERTENSION: ICD-10-CM

## 2023-06-19 DIAGNOSIS — G47.30 SLEEP APNEA, UNSPECIFIED TYPE: ICD-10-CM

## 2023-06-19 DIAGNOSIS — N18.5 ANEMIA DUE TO STAGE 5 CHRONIC KIDNEY DISEASE, NOT ON CHRONIC DIALYSIS: ICD-10-CM

## 2023-06-19 LAB
BACTERIA #/AREA URNS AUTO: NORMAL /HPF
BASOPHILS # BLD AUTO: 0.02 K/UL (ref 0–0.2)
BASOPHILS NFR BLD: 0.4 % (ref 0–1.9)
BILIRUB UR QL STRIP: NEGATIVE
CLARITY UR REFRACT.AUTO: CLEAR
COLOR UR AUTO: ABNORMAL
CREAT UR-MCNC: 110 MG/DL (ref 23–375)
DIFFERENTIAL METHOD: ABNORMAL
EOSINOPHIL # BLD AUTO: 0.1 K/UL (ref 0–0.5)
EOSINOPHIL NFR BLD: 2.5 % (ref 0–8)
ERYTHROCYTE [DISTWIDTH] IN BLOOD BY AUTOMATED COUNT: 13.3 % (ref 11.5–14.5)
GLUCOSE UR QL STRIP: NEGATIVE
HCT VFR BLD AUTO: 28.5 % (ref 40–54)
HGB BLD-MCNC: 9.5 G/DL (ref 14–18)
HGB UR QL STRIP: NEGATIVE
HYALINE CASTS UR QL AUTO: 0 /LPF
IMM GRANULOCYTES # BLD AUTO: 0.01 K/UL (ref 0–0.04)
IMM GRANULOCYTES NFR BLD AUTO: 0.2 % (ref 0–0.5)
KETONES UR QL STRIP: NEGATIVE
LEUKOCYTE ESTERASE UR QL STRIP: NEGATIVE
LYMPHOCYTES # BLD AUTO: 1.1 K/UL (ref 1–4.8)
LYMPHOCYTES NFR BLD: 25.2 % (ref 18–48)
MCH RBC QN AUTO: 30.7 PG (ref 27–31)
MCHC RBC AUTO-ENTMCNC: 33.3 G/DL (ref 32–36)
MCV RBC AUTO: 92 FL (ref 82–98)
MICROSCOPIC COMMENT: NORMAL
MONOCYTES # BLD AUTO: 0.5 K/UL (ref 0.3–1)
MONOCYTES NFR BLD: 12.1 % (ref 4–15)
NEUTROPHILS # BLD AUTO: 2.7 K/UL (ref 1.8–7.7)
NEUTROPHILS NFR BLD: 59.6 % (ref 38–73)
NITRITE UR QL STRIP: NEGATIVE
NRBC BLD-RTO: 0 /100 WBC
PH UR STRIP: 6 [PH] (ref 5–8)
PLATELET # BLD AUTO: 250 K/UL (ref 150–450)
PMV BLD AUTO: 10.8 FL (ref 9.2–12.9)
PROT UR QL STRIP: ABNORMAL
PROT UR-MCNC: 287 MG/DL (ref 0–15)
PROT/CREAT UR: 2.61 MG/G{CREAT} (ref 0–0.2)
PTH-INTACT SERPL-MCNC: 319 PG/ML (ref 9–77)
RBC # BLD AUTO: 3.09 M/UL (ref 4.6–6.2)
RBC #/AREA URNS AUTO: 0 /HPF (ref 0–4)
SP GR UR STRIP: 1.01 (ref 1–1.03)
SQUAMOUS #/AREA URNS AUTO: 0 /HPF
URN SPEC COLLECT METH UR: ABNORMAL
WBC # BLD AUTO: 4.45 K/UL (ref 3.9–12.7)
WBC #/AREA URNS AUTO: 2 /HPF (ref 0–5)

## 2023-06-19 PROCEDURE — 81001 URINALYSIS AUTO W/SCOPE: CPT | Mod: TXP | Performed by: INTERNAL MEDICINE

## 2023-06-19 PROCEDURE — 86832 HLA CLASS I HIGH DEFIN QUAL: CPT | Mod: PO,TXP | Performed by: NURSE PRACTITIONER

## 2023-06-19 PROCEDURE — 86833 HLA CLASS II HIGH DEFIN QUAL: CPT | Mod: PO,TXP | Performed by: NURSE PRACTITIONER

## 2023-06-19 PROCEDURE — 84550 ASSAY OF BLOOD/URIC ACID: CPT | Mod: NTX | Performed by: INTERNAL MEDICINE

## 2023-06-19 PROCEDURE — 80069 RENAL FUNCTION PANEL: CPT | Mod: NTX | Performed by: INTERNAL MEDICINE

## 2023-06-19 PROCEDURE — 83970 ASSAY OF PARATHORMONE: CPT | Mod: TXP | Performed by: INTERNAL MEDICINE

## 2023-06-19 PROCEDURE — 85025 COMPLETE CBC W/AUTO DIFF WBC: CPT | Mod: NTX | Performed by: INTERNAL MEDICINE

## 2023-06-19 PROCEDURE — 84156 ASSAY OF PROTEIN URINE: CPT | Mod: NTX | Performed by: INTERNAL MEDICINE

## 2023-06-19 PROCEDURE — 36415 COLL VENOUS BLD VENIPUNCTURE: CPT | Mod: PO,NTX | Performed by: INTERNAL MEDICINE

## 2023-06-20 ENCOUNTER — TELEPHONE (OUTPATIENT)
Dept: NEPHROLOGY | Facility: CLINIC | Age: 43
End: 2023-06-20
Payer: COMMERCIAL

## 2023-06-20 LAB
ALBUMIN SERPL BCP-MCNC: 3.4 G/DL (ref 3.5–5.2)
ANION GAP SERPL CALC-SCNC: 17 MMOL/L (ref 8–16)
BUN SERPL-MCNC: 78 MG/DL (ref 6–20)
CALCIUM SERPL-MCNC: 8.1 MG/DL (ref 8.7–10.5)
CHLORIDE SERPL-SCNC: 102 MMOL/L (ref 95–110)
CO2 SERPL-SCNC: 20 MMOL/L (ref 23–29)
CREAT SERPL-MCNC: 12.2 MG/DL (ref 0.5–1.4)
EST. GFR  (NO RACE VARIABLE): 4.8 ML/MIN/1.73 M^2
GLUCOSE SERPL-MCNC: 129 MG/DL (ref 70–110)
PHOSPHATE SERPL-MCNC: 7.5 MG/DL (ref 2.7–4.5)
POTASSIUM SERPL-SCNC: 4.3 MMOL/L (ref 3.5–5.1)
SODIUM SERPL-SCNC: 139 MMOL/L (ref 136–145)
URATE SERPL-MCNC: 11.3 MG/DL (ref 3.4–7)

## 2023-06-20 NOTE — TELEPHONE ENCOUNTER
Dr Hanna left him a message.   Denies any symptoms we discussed. Moved his appt up to a virtual visit tomorrow at 2pm.  Meds reviewed.

## 2023-06-20 NOTE — TELEPHONE ENCOUNTER
----- Message from Nya Hassan sent at 6/20/2023  4:34 PM CDT -----  Contact: pt  Type:  Patient Returning Call    Who Called:Pt  Who Left Message for Patient:??  Does the patient know what this is regarding?:Kidney function and/or labs  Would the patient rather a call back or a response via [facility]? call  Best Call Back Number:[phone]  Additional Information: Please advise thank you

## 2023-06-21 ENCOUNTER — OFFICE VISIT (OUTPATIENT)
Dept: NEPHROLOGY | Facility: CLINIC | Age: 43
End: 2023-06-21
Payer: COMMERCIAL

## 2023-06-21 ENCOUNTER — HOSPITAL ENCOUNTER (INPATIENT)
Facility: HOSPITAL | Age: 43
LOS: 6 days | Discharge: HOME | DRG: 683 | End: 2023-06-28
Attending: EMERGENCY MEDICINE | Admitting: FAMILY MEDICINE
Payer: COMMERCIAL

## 2023-06-21 DIAGNOSIS — D63.1 ANEMIA DUE TO STAGE 5 CHRONIC KIDNEY DISEASE, NOT ON CHRONIC DIALYSIS: ICD-10-CM

## 2023-06-21 DIAGNOSIS — N19 UREMIA: Primary | ICD-10-CM

## 2023-06-21 DIAGNOSIS — E21.3 HYPERPARATHYROIDISM: ICD-10-CM

## 2023-06-21 DIAGNOSIS — R80.1 PERSISTENT PROTEINURIA: ICD-10-CM

## 2023-06-21 DIAGNOSIS — E66.01 SEVERE OBESITY WITH BODY MASS INDEX (BMI) OF 35.0 TO 39.9 WITH COMORBIDITY: ICD-10-CM

## 2023-06-21 DIAGNOSIS — I13.0 HYPERTENSIVE HEART AND RENAL DISEASE WITH CONGESTIVE HEART FAILURE: ICD-10-CM

## 2023-06-21 DIAGNOSIS — G47.30 SLEEP APNEA, UNSPECIFIED TYPE: ICD-10-CM

## 2023-06-21 DIAGNOSIS — N17.9 ACUTE KIDNEY INJURY: ICD-10-CM

## 2023-06-21 DIAGNOSIS — I27.20 PULMONARY HYPERTENSION: Primary | ICD-10-CM

## 2023-06-21 DIAGNOSIS — I50.31 ACUTE HEART FAILURE WITH PRESERVED EJECTION FRACTION: ICD-10-CM

## 2023-06-21 DIAGNOSIS — N18.5 ANEMIA DUE TO STAGE 5 CHRONIC KIDNEY DISEASE, NOT ON CHRONIC DIALYSIS: ICD-10-CM

## 2023-06-21 DIAGNOSIS — N18.5 STAGE 5 CHRONIC KIDNEY DISEASE NOT ON CHRONIC DIALYSIS: ICD-10-CM

## 2023-06-21 DIAGNOSIS — N18.6 ESRD (END STAGE RENAL DISEASE): ICD-10-CM

## 2023-06-21 DIAGNOSIS — R07.9 CHEST PAIN: ICD-10-CM

## 2023-06-21 PROBLEM — I50.32 CHRONIC HEART FAILURE WITH PRESERVED EJECTION FRACTION: Status: ACTIVE | Noted: 2023-06-21

## 2023-06-21 PROBLEM — I1A.0 RESISTANT HYPERTENSION: Status: ACTIVE | Noted: 2023-06-21

## 2023-06-21 PROBLEM — D63.8 ANEMIA OF CHRONIC DISEASE: Status: ACTIVE | Noted: 2022-10-10

## 2023-06-21 LAB
ALBUMIN SERPL BCP-MCNC: 3.6 G/DL (ref 3.5–5.2)
ALP SERPL-CCNC: 41 U/L (ref 55–135)
ALT SERPL W/O P-5'-P-CCNC: 10 U/L (ref 10–44)
ANION GAP SERPL CALC-SCNC: 11 MMOL/L (ref 8–16)
AST SERPL-CCNC: 7 U/L (ref 10–40)
BASOPHILS # BLD AUTO: 0.02 K/UL (ref 0–0.2)
BASOPHILS NFR BLD: 0.5 % (ref 0–1.9)
BILIRUB SERPL-MCNC: 0.6 MG/DL (ref 0.1–1)
BNP SERPL-MCNC: 77 PG/ML (ref 0–99)
BUN SERPL-MCNC: 81 MG/DL (ref 6–20)
CALCIUM SERPL-MCNC: 8.9 MG/DL (ref 8.7–10.5)
CHLORIDE SERPL-SCNC: 98 MMOL/L (ref 95–110)
CO2 SERPL-SCNC: 26 MMOL/L (ref 23–29)
CREAT SERPL-MCNC: 11.9 MG/DL (ref 0.5–1.4)
DIFFERENTIAL METHOD: ABNORMAL
EOSINOPHIL # BLD AUTO: 0.1 K/UL (ref 0–0.5)
EOSINOPHIL NFR BLD: 2.2 % (ref 0–8)
ERYTHROCYTE [DISTWIDTH] IN BLOOD BY AUTOMATED COUNT: 13.2 % (ref 11.5–14.5)
EST. GFR  (NO RACE VARIABLE): 5 ML/MIN/1.73 M^2
GLUCOSE SERPL-MCNC: 114 MG/DL (ref 70–110)
HCT VFR BLD AUTO: 30.2 % (ref 40–54)
HGB BLD-MCNC: 9.8 G/DL (ref 14–18)
IMM GRANULOCYTES # BLD AUTO: 0.01 K/UL (ref 0–0.04)
IMM GRANULOCYTES NFR BLD AUTO: 0.2 % (ref 0–0.5)
LYMPHOCYTES # BLD AUTO: 1 K/UL (ref 1–4.8)
LYMPHOCYTES NFR BLD: 25.1 % (ref 18–48)
MAGNESIUM SERPL-MCNC: 2.1 MG/DL (ref 1.6–2.6)
MCH RBC QN AUTO: 29.9 PG (ref 27–31)
MCHC RBC AUTO-ENTMCNC: 32.5 G/DL (ref 32–36)
MCV RBC AUTO: 92 FL (ref 82–98)
MONOCYTES # BLD AUTO: 0.6 K/UL (ref 0.3–1)
MONOCYTES NFR BLD: 14.1 % (ref 4–15)
NEUTROPHILS # BLD AUTO: 2.3 K/UL (ref 1.8–7.7)
NEUTROPHILS NFR BLD: 57.9 % (ref 38–73)
NRBC BLD-RTO: 0 /100 WBC
PHOSPHATE SERPL-MCNC: 7.8 MG/DL (ref 2.7–4.5)
PLATELET # BLD AUTO: 260 K/UL (ref 150–450)
PMV BLD AUTO: 9.7 FL (ref 9.2–12.9)
POTASSIUM SERPL-SCNC: 4.2 MMOL/L (ref 3.5–5.1)
PROT SERPL-MCNC: 7.6 G/DL (ref 6–8.4)
RBC # BLD AUTO: 3.28 M/UL (ref 4.6–6.2)
SODIUM SERPL-SCNC: 135 MMOL/L (ref 136–145)
TROPONIN I SERPL HS-MCNC: 19.6 PG/ML (ref 0–14.9)
WBC # BLD AUTO: 4.03 K/UL (ref 3.9–12.7)

## 2023-06-21 PROCEDURE — 99214 OFFICE O/P EST MOD 30 MIN: CPT | Mod: 95,,, | Performed by: INTERNAL MEDICINE

## 2023-06-21 PROCEDURE — 1159F MED LIST DOCD IN RCRD: CPT | Mod: CPTII,95,, | Performed by: INTERNAL MEDICINE

## 2023-06-21 PROCEDURE — G0378 HOSPITAL OBSERVATION PER HR: HCPCS

## 2023-06-21 PROCEDURE — 93005 ELECTROCARDIOGRAM TRACING: CPT | Performed by: SPECIALIST

## 2023-06-21 PROCEDURE — 85025 COMPLETE CBC W/AUTO DIFF WBC: CPT | Performed by: EMERGENCY MEDICINE

## 2023-06-21 PROCEDURE — 1159F PR MEDICATION LIST DOCUMENTED IN MEDICAL RECORD: ICD-10-PCS | Mod: CPTII,95,, | Performed by: INTERNAL MEDICINE

## 2023-06-21 PROCEDURE — 1160F PR REVIEW ALL MEDS BY PRESCRIBER/CLIN PHARMACIST DOCUMENTED: ICD-10-PCS | Mod: CPTII,95,, | Performed by: INTERNAL MEDICINE

## 2023-06-21 PROCEDURE — 3066F NEPHROPATHY DOC TX: CPT | Mod: CPTII,95,, | Performed by: INTERNAL MEDICINE

## 2023-06-21 PROCEDURE — 84484 ASSAY OF TROPONIN QUANT: CPT | Performed by: EMERGENCY MEDICINE

## 2023-06-21 PROCEDURE — 84100 ASSAY OF PHOSPHORUS: CPT | Performed by: EMERGENCY MEDICINE

## 2023-06-21 PROCEDURE — 4010F PR ACE/ARB THEARPY RXD/TAKEN: ICD-10-PCS | Mod: CPTII,95,, | Performed by: INTERNAL MEDICINE

## 2023-06-21 PROCEDURE — 83880 ASSAY OF NATRIURETIC PEPTIDE: CPT | Performed by: EMERGENCY MEDICINE

## 2023-06-21 PROCEDURE — 96372 THER/PROPH/DIAG INJ SC/IM: CPT | Performed by: NURSE PRACTITIONER

## 2023-06-21 PROCEDURE — 1160F RVW MEDS BY RX/DR IN RCRD: CPT | Mod: CPTII,95,, | Performed by: INTERNAL MEDICINE

## 2023-06-21 PROCEDURE — 93010 ELECTROCARDIOGRAM REPORT: CPT | Mod: ,,, | Performed by: SPECIALIST

## 2023-06-21 PROCEDURE — 80053 COMPREHEN METABOLIC PANEL: CPT | Performed by: EMERGENCY MEDICINE

## 2023-06-21 PROCEDURE — 99214 PR OFFICE/OUTPT VISIT, EST, LEVL IV, 30-39 MIN: ICD-10-PCS | Mod: 95,,, | Performed by: INTERNAL MEDICINE

## 2023-06-21 PROCEDURE — 3066F PR DOCUMENTATION OF TREATMENT FOR NEPHROPATHY: ICD-10-PCS | Mod: CPTII,95,, | Performed by: INTERNAL MEDICINE

## 2023-06-21 PROCEDURE — 25000003 PHARM REV CODE 250: Performed by: NURSE PRACTITIONER

## 2023-06-21 PROCEDURE — 4010F ACE/ARB THERAPY RXD/TAKEN: CPT | Mod: CPTII,95,, | Performed by: INTERNAL MEDICINE

## 2023-06-21 PROCEDURE — 83735 ASSAY OF MAGNESIUM: CPT | Performed by: EMERGENCY MEDICINE

## 2023-06-21 PROCEDURE — 93010 EKG 12-LEAD: ICD-10-PCS | Mod: ,,, | Performed by: SPECIALIST

## 2023-06-21 PROCEDURE — 99285 EMERGENCY DEPT VISIT HI MDM: CPT | Mod: 25

## 2023-06-21 PROCEDURE — 63600175 PHARM REV CODE 636 W HCPCS: Performed by: NURSE PRACTITIONER

## 2023-06-21 RX ORDER — HEPARIN SODIUM 5000 [USP'U]/ML
5000 INJECTION, SOLUTION INTRAVENOUS; SUBCUTANEOUS EVERY 8 HOURS
Status: DISCONTINUED | OUTPATIENT
Start: 2023-06-21 | End: 2023-06-21

## 2023-06-21 RX ORDER — HEPARIN SODIUM 5000 [USP'U]/ML
5000 INJECTION, SOLUTION INTRAVENOUS; SUBCUTANEOUS ONCE
Status: COMPLETED | OUTPATIENT
Start: 2023-06-21 | End: 2023-06-21

## 2023-06-21 RX ORDER — TALC
6 POWDER (GRAM) TOPICAL NIGHTLY PRN
Status: DISCONTINUED | OUTPATIENT
Start: 2023-06-21 | End: 2023-06-24

## 2023-06-21 RX ORDER — OXYCODONE HYDROCHLORIDE 5 MG/1
5 TABLET ORAL EVERY 6 HOURS PRN
Status: DISCONTINUED | OUTPATIENT
Start: 2023-06-21 | End: 2023-06-23

## 2023-06-21 RX ORDER — NALOXONE HCL 0.4 MG/ML
0.02 VIAL (ML) INJECTION
Status: DISCONTINUED | OUTPATIENT
Start: 2023-06-21 | End: 2023-06-29 | Stop reason: HOSPADM

## 2023-06-21 RX ORDER — SIMETHICONE 80 MG
1 TABLET,CHEWABLE ORAL 4 TIMES DAILY PRN
Status: DISCONTINUED | OUTPATIENT
Start: 2023-06-21 | End: 2023-06-24

## 2023-06-21 RX ORDER — FUROSEMIDE 40 MG/1
80 TABLET ORAL
Status: DISCONTINUED | OUTPATIENT
Start: 2023-06-21 | End: 2023-06-28

## 2023-06-21 RX ORDER — CALCIUM CARBONATE 200(500)MG
1000 TABLET,CHEWABLE ORAL 3 TIMES DAILY
Status: DISCONTINUED | OUTPATIENT
Start: 2023-06-21 | End: 2023-06-29 | Stop reason: HOSPADM

## 2023-06-21 RX ORDER — HYDRALAZINE HYDROCHLORIDE 25 MG/1
100 TABLET, FILM COATED ORAL 3 TIMES DAILY
Status: DISCONTINUED | OUTPATIENT
Start: 2023-06-22 | End: 2023-06-29 | Stop reason: HOSPADM

## 2023-06-21 RX ORDER — SODIUM CHLORIDE 0.9 % (FLUSH) 0.9 %
10 SYRINGE (ML) INJECTION
Status: DISCONTINUED | OUTPATIENT
Start: 2023-06-21 | End: 2023-06-29 | Stop reason: HOSPADM

## 2023-06-21 RX ORDER — CLONIDINE HYDROCHLORIDE 0.1 MG/1
0.3 TABLET ORAL 3 TIMES DAILY
Status: DISCONTINUED | OUTPATIENT
Start: 2023-06-21 | End: 2023-06-25

## 2023-06-21 RX ORDER — SEVELAMER CARBONATE 800 MG/1
1600 TABLET, FILM COATED ORAL
Status: DISCONTINUED | OUTPATIENT
Start: 2023-06-22 | End: 2023-06-29 | Stop reason: HOSPADM

## 2023-06-21 RX ORDER — CALCITRIOL 0.25 UG/1
0.25 CAPSULE ORAL DAILY
Status: DISCONTINUED | OUTPATIENT
Start: 2023-06-22 | End: 2023-06-29 | Stop reason: HOSPADM

## 2023-06-21 RX ORDER — ONDANSETRON 2 MG/ML
4 INJECTION INTRAMUSCULAR; INTRAVENOUS EVERY 6 HOURS PRN
Status: DISCONTINUED | OUTPATIENT
Start: 2023-06-21 | End: 2023-06-29 | Stop reason: HOSPADM

## 2023-06-21 RX ORDER — AMLODIPINE BESYLATE 5 MG/1
10 TABLET ORAL DAILY
Status: DISCONTINUED | OUTPATIENT
Start: 2023-06-22 | End: 2023-06-25

## 2023-06-21 RX ORDER — ISOSORBIDE MONONITRATE 60 MG/1
120 TABLET, EXTENDED RELEASE ORAL DAILY
Status: DISCONTINUED | OUTPATIENT
Start: 2023-06-22 | End: 2023-06-29 | Stop reason: HOSPADM

## 2023-06-21 RX ORDER — VALSARTAN 80 MG/1
320 TABLET ORAL DAILY
Status: DISCONTINUED | OUTPATIENT
Start: 2023-06-22 | End: 2023-06-24

## 2023-06-21 RX ORDER — ACETAMINOPHEN 325 MG/1
650 TABLET ORAL EVERY 4 HOURS PRN
Status: DISCONTINUED | OUTPATIENT
Start: 2023-06-21 | End: 2023-06-29 | Stop reason: HOSPADM

## 2023-06-21 RX ORDER — PROCHLORPERAZINE EDISYLATE 5 MG/ML
5 INJECTION INTRAMUSCULAR; INTRAVENOUS EVERY 6 HOURS PRN
Status: DISCONTINUED | OUTPATIENT
Start: 2023-06-21 | End: 2023-06-29 | Stop reason: HOSPADM

## 2023-06-21 RX ORDER — POLYETHYLENE GLYCOL 3350 17 G/17G
17 POWDER, FOR SOLUTION ORAL 2 TIMES DAILY PRN
Status: DISCONTINUED | OUTPATIENT
Start: 2023-06-21 | End: 2023-06-24

## 2023-06-21 RX ORDER — METOPROLOL SUCCINATE 50 MG/1
150 TABLET, EXTENDED RELEASE ORAL DAILY
Status: DISCONTINUED | OUTPATIENT
Start: 2023-06-22 | End: 2023-06-29 | Stop reason: HOSPADM

## 2023-06-21 RX ORDER — OXYCODONE HYDROCHLORIDE 5 MG/1
10 TABLET ORAL EVERY 6 HOURS PRN
Status: DISCONTINUED | OUTPATIENT
Start: 2023-06-21 | End: 2023-06-23

## 2023-06-21 RX ADMIN — CALCIUM CARBONATE 1000 MG: 500 TABLET, CHEWABLE ORAL at 09:06

## 2023-06-21 RX ADMIN — CLONIDINE HYDROCHLORIDE 0.3 MG: 0.1 TABLET ORAL at 09:06

## 2023-06-21 RX ADMIN — HEPARIN SODIUM 5000 UNITS: 5000 INJECTION, SOLUTION INTRAVENOUS; SUBCUTANEOUS at 09:06

## 2023-06-21 RX ADMIN — FUROSEMIDE 80 MG: 40 TABLET ORAL at 09:06

## 2023-06-21 NOTE — PROGRESS NOTES
The patient location is: [place]  The chief complaint leading to consultation is: CKD    Visit type: audio only    Face to Face time with patient: 10 min  20 minutes of total time spent on the encounter, which includes face to face time and non-face to face time preparing to see the patient (eg, review of tests), Obtaining and/or reviewing separately obtained history, Documenting clinical information in the electronic or other health record, Independently interpreting results (not separately reported) and communicating results to the patient/family/caregiver, or Care coordination (not separately reported).         Each patient to whom he or she provides medical services by telemedicine is:  (1) informed of the relationship between the physician and patient and the respective role of any other health care provider with respect to management of the patient; and (2) notified that he or she may decline to receive medical services by telemedicine and may withdraw from such care at any time.    Notes:     Subjective:       Patient ID: João Ham is a 42 y.o. Black or  male who presents for follow up on CKD.     Since last seen at nephrology clinic, João Ham no new medications or hospitalizations. Renal function decreased to GFR of 5, pt was asked to report to [facility] for HD initiation.  No uremic symptoms, not volume overloaded.    Reports taking their medications on time, without missed doses. As to their chronic conditions:  - CKD: Denies use of NSAIDs.   2022: baseline sr cr of 7 - 7.5 mg/dL with UPCR 1.9   2023: baseline sr cr of 7-8 mg/dL with UPCR 1.1. Transplant eval initiated: needs dental clearance.  - HTN: controlled, follows low salt diet. Denies uncontrolled HTN, dizziness/lightheadedness or hypotension/syncopal episodes.  - dHF, echo in 2022: Left ventricle is normal in size with mild concentric hypertrophy and normal systolic function. Grade III left ventricular  diastolic  dysfunction. Moderate right ventricular enlargement with mildly to moderately reduced right ventricular systolic function. Severe left atrial enlargement. There is moderate pulmonary hypertension.     Review of Systems   Constitutional:  Negative for appetite change, chills and fever.   HENT:  Negative for congestion.    Eyes:  Negative for visual disturbance.   Respiratory:  Negative for cough and shortness of breath.    Cardiovascular:  Negative for chest pain and leg swelling.   Gastrointestinal:  Negative for abdominal pain, diarrhea, nausea and vomiting.   Genitourinary:  Negative for difficulty urinating, dysuria and hematuria.   Musculoskeletal:  Negative for myalgias.   Skin:  Negative for rash.   Neurological:  Negative for headaches.   Psychiatric/Behavioral:  Negative for sleep disturbance.      The past medical, family and social histories were reviewed for this encounter.     There were no vitals taken for this visit.    Objective:      Physical Exam  Vitals reviewed.   Constitutional:       General: He is not in acute distress.     Appearance: He is well-developed.   HENT:      Head: Normocephalic and atraumatic.   Eyes:      General: No scleral icterus.  Pulmonary:      Effort: Pulmonary effort is normal. No respiratory distress.   Neurological:      Mental Status: He is alert and oriented to person, place, and time.   Psychiatric:         Mood and Affect: Mood normal.         Behavior: Behavior normal.       Assessment:       1. Pulmonary hypertension    2. Hypertensive heart and renal disease with congestive heart failure    3. Acute heart failure with preserved ejection fraction    4. Stage 5 chronic kidney disease not on chronic dialysis    5. Persistent proteinuria    6. Anemia due to stage 5 chronic kidney disease, not on chronic dialysis    7. Severe obesity with body mass index (BMI) of 35.0 to 39.9 with comorbidity    8. Hyperparathyroidism    9. 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  Baseline creatinine is 7 - 7.5 mg/dL and sub-nephrotic proteinuria  Lab Results   Component Value Date    CREATININE 12.2 (H) 06/19/2023      - Euvolemic   - Complete avoidance of NSAIDs   - Low salt diet with < 2000 mg/day   - Dose adjust medications to GFR of < 15   - Avoid nephrotoxins including IV contrast   - Pt was asked to report to ED for HD initiation, called [facility] and gave sign out to ED    HTN   - BP uncontrolled: avoid hypotension and dehydration    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

## 2023-06-22 ENCOUNTER — ANESTHESIA (OUTPATIENT)
Dept: SURGERY | Facility: HOSPITAL | Age: 43
DRG: 683 | End: 2023-06-22
Payer: COMMERCIAL

## 2023-06-22 ENCOUNTER — ANESTHESIA EVENT (OUTPATIENT)
Dept: SURGERY | Facility: HOSPITAL | Age: 43
DRG: 683 | End: 2023-06-22
Payer: COMMERCIAL

## 2023-06-22 LAB
ALBUMIN SERPL BCP-MCNC: 3.7 G/DL (ref 3.5–5.2)
ANION GAP SERPL CALC-SCNC: 11 MMOL/L (ref 8–16)
BASOPHILS # BLD AUTO: 0.01 K/UL (ref 0–0.2)
BASOPHILS NFR BLD: 0.2 % (ref 0–1.9)
BUN SERPL-MCNC: 81 MG/DL (ref 6–20)
CALCIUM SERPL-MCNC: 9.1 MG/DL (ref 8.7–10.5)
CHLORIDE SERPL-SCNC: 100 MMOL/L (ref 95–110)
CO2 SERPL-SCNC: 27 MMOL/L (ref 23–29)
CREAT SERPL-MCNC: 11.8 MG/DL (ref 0.5–1.4)
DIFFERENTIAL METHOD: ABNORMAL
EOSINOPHIL # BLD AUTO: 0.1 K/UL (ref 0–0.5)
EOSINOPHIL NFR BLD: 2.4 % (ref 0–8)
ERYTHROCYTE [DISTWIDTH] IN BLOOD BY AUTOMATED COUNT: 13.2 % (ref 11.5–14.5)
EST. GFR  (NO RACE VARIABLE): 5 ML/MIN/1.73 M^2
ESTIMATED AVG GLUCOSE: 111 MG/DL (ref 68–131)
GLUCOSE SERPL-MCNC: 99 MG/DL (ref 70–110)
GLUCOSE SERPL-MCNC: 99 MG/DL (ref 70–110)
HBA1C MFR BLD: 5.5 % (ref 4.5–6.2)
HCT VFR BLD AUTO: 31.2 % (ref 40–54)
HGB BLD-MCNC: 10.4 G/DL (ref 14–18)
IMM GRANULOCYTES # BLD AUTO: 0.01 K/UL (ref 0–0.04)
IMM GRANULOCYTES NFR BLD AUTO: 0.2 % (ref 0–0.5)
LYMPHOCYTES # BLD AUTO: 1.2 K/UL (ref 1–4.8)
LYMPHOCYTES NFR BLD: 26.2 % (ref 18–48)
MAGNESIUM SERPL-MCNC: 2.2 MG/DL (ref 1.6–2.6)
MCH RBC QN AUTO: 30.6 PG (ref 27–31)
MCHC RBC AUTO-ENTMCNC: 33.3 G/DL (ref 32–36)
MCV RBC AUTO: 92 FL (ref 82–98)
MONOCYTES # BLD AUTO: 0.7 K/UL (ref 0.3–1)
MONOCYTES NFR BLD: 15.8 % (ref 4–15)
NEUTROPHILS # BLD AUTO: 2.5 K/UL (ref 1.8–7.7)
NEUTROPHILS NFR BLD: 55.2 % (ref 38–73)
NRBC BLD-RTO: 0 /100 WBC
PHOSPHATE SERPL-MCNC: 8.4 MG/DL (ref 2.7–4.5)
PLATELET # BLD AUTO: 278 K/UL (ref 150–450)
PMV BLD AUTO: 10.5 FL (ref 9.2–12.9)
POTASSIUM SERPL-SCNC: 4.3 MMOL/L (ref 3.5–5.1)
RBC # BLD AUTO: 3.4 M/UL (ref 4.6–6.2)
SODIUM SERPL-SCNC: 138 MMOL/L (ref 136–145)
WBC # BLD AUTO: 4.5 K/UL (ref 3.9–12.7)

## 2023-06-22 PROCEDURE — 36415 COLL VENOUS BLD VENIPUNCTURE: CPT | Performed by: STUDENT IN AN ORGANIZED HEALTH CARE EDUCATION/TRAINING PROGRAM

## 2023-06-22 PROCEDURE — D9220A PRA ANESTHESIA: ICD-10-PCS | Mod: ANES,,, | Performed by: ANESTHESIOLOGY

## 2023-06-22 PROCEDURE — 94760 N-INVAS EAR/PLS OXIMETRY 1: CPT

## 2023-06-22 PROCEDURE — 99900031 HC PATIENT EDUCATION (STAT)

## 2023-06-22 PROCEDURE — 25000003 PHARM REV CODE 250: Performed by: INTERNAL MEDICINE

## 2023-06-22 PROCEDURE — 36415 COLL VENOUS BLD VENIPUNCTURE: CPT | Performed by: NURSE PRACTITIONER

## 2023-06-22 PROCEDURE — 83036 HEMOGLOBIN GLYCOSYLATED A1C: CPT | Performed by: NURSE PRACTITIONER

## 2023-06-22 PROCEDURE — 85025 COMPLETE CBC W/AUTO DIFF WBC: CPT | Performed by: NURSE PRACTITIONER

## 2023-06-22 PROCEDURE — 36000707: Performed by: SURGERY

## 2023-06-22 PROCEDURE — D9220A PRA ANESTHESIA: ICD-10-PCS | Mod: CRNA,,, | Performed by: NURSE ANESTHETIST, CERTIFIED REGISTERED

## 2023-06-22 PROCEDURE — C1750 CATH, HEMODIALYSIS,LONG-TERM: HCPCS | Performed by: SURGERY

## 2023-06-22 PROCEDURE — 12000002 HC ACUTE/MED SURGE SEMI-PRIVATE ROOM

## 2023-06-22 PROCEDURE — 86706 HEP B SURFACE ANTIBODY: CPT | Performed by: STUDENT IN AN ORGANIZED HEALTH CARE EDUCATION/TRAINING PROGRAM

## 2023-06-22 PROCEDURE — 63600175 PHARM REV CODE 636 W HCPCS: Performed by: SURGERY

## 2023-06-22 PROCEDURE — 37000008 HC ANESTHESIA 1ST 15 MINUTES: Performed by: SURGERY

## 2023-06-22 PROCEDURE — 25000003 PHARM REV CODE 250: Performed by: NURSE ANESTHETIST, CERTIFIED REGISTERED

## 2023-06-22 PROCEDURE — D9220A PRA ANESTHESIA: Mod: CRNA,,, | Performed by: NURSE ANESTHETIST, CERTIFIED REGISTERED

## 2023-06-22 PROCEDURE — 63600175 PHARM REV CODE 636 W HCPCS: Performed by: NURSE ANESTHETIST, CERTIFIED REGISTERED

## 2023-06-22 PROCEDURE — 71000033 HC RECOVERY, INTIAL HOUR: Performed by: SURGERY

## 2023-06-22 PROCEDURE — 80069 RENAL FUNCTION PANEL: CPT | Performed by: NURSE PRACTITIONER

## 2023-06-22 PROCEDURE — 86704 HEP B CORE ANTIBODY TOTAL: CPT | Performed by: STUDENT IN AN ORGANIZED HEALTH CARE EDUCATION/TRAINING PROGRAM

## 2023-06-22 PROCEDURE — 25000003 PHARM REV CODE 250: Performed by: NURSE PRACTITIONER

## 2023-06-22 PROCEDURE — D9220A PRA ANESTHESIA: Mod: ANES,,, | Performed by: ANESTHESIOLOGY

## 2023-06-22 PROCEDURE — 36000706: Performed by: SURGERY

## 2023-06-22 PROCEDURE — C1769 GUIDE WIRE: HCPCS | Performed by: SURGERY

## 2023-06-22 PROCEDURE — 37000009 HC ANESTHESIA EA ADD 15 MINS: Performed by: SURGERY

## 2023-06-22 PROCEDURE — 87340 HEPATITIS B SURFACE AG IA: CPT | Performed by: STUDENT IN AN ORGANIZED HEALTH CARE EDUCATION/TRAINING PROGRAM

## 2023-06-22 PROCEDURE — 25000003 PHARM REV CODE 250: Performed by: SURGERY

## 2023-06-22 PROCEDURE — 83735 ASSAY OF MAGNESIUM: CPT | Performed by: NURSE PRACTITIONER

## 2023-06-22 DEVICE — CATHETER HEMOSPLIT 19CM 5733690: Type: IMPLANTABLE DEVICE | Site: NECK | Status: FUNCTIONAL

## 2023-06-22 RX ORDER — MUPIROCIN 20 MG/G
OINTMENT TOPICAL 2 TIMES DAILY
Status: DISPENSED | OUTPATIENT
Start: 2023-06-22 | End: 2023-06-27

## 2023-06-22 RX ORDER — HYDROMORPHONE HYDROCHLORIDE 1 MG/ML
0.2 INJECTION, SOLUTION INTRAMUSCULAR; INTRAVENOUS; SUBCUTANEOUS EVERY 5 MIN PRN
Status: DISCONTINUED | OUTPATIENT
Start: 2023-06-22 | End: 2023-06-22 | Stop reason: HOSPADM

## 2023-06-22 RX ORDER — MIDAZOLAM HYDROCHLORIDE 1 MG/ML
INJECTION INTRAMUSCULAR; INTRAVENOUS
Status: DISCONTINUED | OUTPATIENT
Start: 2023-06-22 | End: 2023-06-22

## 2023-06-22 RX ORDER — SODIUM CHLORIDE 9 MG/ML
INJECTION, SOLUTION INTRAVENOUS ONCE
Status: CANCELLED | OUTPATIENT
Start: 2023-06-22 | End: 2023-06-22

## 2023-06-22 RX ORDER — HEPARIN SODIUM 5000 [USP'U]/ML
5000 INJECTION, SOLUTION INTRAVENOUS; SUBCUTANEOUS
Status: CANCELLED | OUTPATIENT
Start: 2023-06-22

## 2023-06-22 RX ORDER — DIPHENHYDRAMINE HYDROCHLORIDE 50 MG/ML
12.5 INJECTION INTRAMUSCULAR; INTRAVENOUS
Status: DISCONTINUED | OUTPATIENT
Start: 2023-06-22 | End: 2023-06-22 | Stop reason: HOSPADM

## 2023-06-22 RX ORDER — ONDANSETRON 2 MG/ML
4 INJECTION INTRAMUSCULAR; INTRAVENOUS DAILY PRN
Status: DISCONTINUED | OUTPATIENT
Start: 2023-06-22 | End: 2023-06-22 | Stop reason: HOSPADM

## 2023-06-22 RX ORDER — SODIUM CHLORIDE 9 MG/ML
INJECTION, SOLUTION INTRAVENOUS
Status: CANCELLED | OUTPATIENT
Start: 2023-06-22

## 2023-06-22 RX ORDER — SODIUM CHLORIDE, SODIUM LACTATE, POTASSIUM CHLORIDE, CALCIUM CHLORIDE 600; 310; 30; 20 MG/100ML; MG/100ML; MG/100ML; MG/100ML
INJECTION, SOLUTION INTRAVENOUS CONTINUOUS PRN
Status: DISCONTINUED | OUTPATIENT
Start: 2023-06-22 | End: 2023-06-22

## 2023-06-22 RX ORDER — OXYCODONE HYDROCHLORIDE 5 MG/1
5 TABLET ORAL
Status: DISCONTINUED | OUTPATIENT
Start: 2023-06-22 | End: 2023-06-22 | Stop reason: HOSPADM

## 2023-06-22 RX ORDER — LIDOCAINE HYDROCHLORIDE 10 MG/ML
INJECTION INFILTRATION; PERINEURAL
Status: DISCONTINUED | OUTPATIENT
Start: 2023-06-22 | End: 2023-06-22 | Stop reason: HOSPADM

## 2023-06-22 RX ORDER — PROPOFOL 10 MG/ML
INJECTION, EMULSION INTRAVENOUS
Status: DISCONTINUED | OUTPATIENT
Start: 2023-06-22 | End: 2023-06-22

## 2023-06-22 RX ORDER — HEPARIN SODIUM 1000 [USP'U]/ML
INJECTION, SOLUTION INTRAVENOUS; SUBCUTANEOUS
Status: DISCONTINUED | OUTPATIENT
Start: 2023-06-22 | End: 2023-06-22 | Stop reason: HOSPADM

## 2023-06-22 RX ADMIN — PROPOFOL 50 MG: 10 INJECTION, EMULSION INTRAVENOUS at 05:06

## 2023-06-22 RX ADMIN — CALCITRIOL CAPSULES 0.25 MCG 0.25 MCG: 0.25 CAPSULE ORAL at 09:06

## 2023-06-22 RX ADMIN — PROPOFOL 100 MG: 10 INJECTION, EMULSION INTRAVENOUS at 05:06

## 2023-06-22 RX ADMIN — PROPOFOL 50 MG: 10 INJECTION, EMULSION INTRAVENOUS at 06:06

## 2023-06-22 RX ADMIN — MIDAZOLAM HYDROCHLORIDE 2 MG: 1 INJECTION, SOLUTION INTRAMUSCULAR; INTRAVENOUS at 05:06

## 2023-06-22 RX ADMIN — CLONIDINE HYDROCHLORIDE 0.3 MG: 0.1 TABLET ORAL at 08:06

## 2023-06-22 RX ADMIN — SEVELAMER CARBONATE 1600 MG: 800 TABLET, FILM COATED ORAL at 08:06

## 2023-06-22 RX ADMIN — DEXTROSE 2 G: 50 INJECTION, SOLUTION INTRAVENOUS at 05:06

## 2023-06-22 RX ADMIN — SODIUM CHLORIDE, SODIUM LACTATE, POTASSIUM CHLORIDE, AND CALCIUM CHLORIDE: .6; .31; .03; .02 INJECTION, SOLUTION INTRAVENOUS at 05:06

## 2023-06-22 RX ADMIN — CALCIUM CARBONATE 1000 MG: 500 TABLET, CHEWABLE ORAL at 09:06

## 2023-06-22 RX ADMIN — OXYCODONE HYDROCHLORIDE 10 MG: 5 TABLET ORAL at 08:06

## 2023-06-22 RX ADMIN — HYDRALAZINE HYDROCHLORIDE 100 MG: 25 TABLET ORAL at 09:06

## 2023-06-22 RX ADMIN — ISOSORBIDE MONONITRATE 120 MG: 60 TABLET, EXTENDED RELEASE ORAL at 09:06

## 2023-06-22 RX ADMIN — CLONIDINE HYDROCHLORIDE 0.3 MG: 0.1 TABLET ORAL at 09:06

## 2023-06-22 RX ADMIN — FUROSEMIDE 80 MG: 40 TABLET ORAL at 05:06

## 2023-06-22 RX ADMIN — FUROSEMIDE 80 MG: 40 TABLET ORAL at 11:06

## 2023-06-22 RX ADMIN — HYDRALAZINE HYDROCHLORIDE 100 MG: 25 TABLET ORAL at 08:06

## 2023-06-22 RX ADMIN — METOPROLOL SUCCINATE 150 MG: 50 TABLET, FILM COATED, EXTENDED RELEASE ORAL at 09:06

## 2023-06-22 RX ADMIN — MUPIROCIN 1 G: 20 OINTMENT TOPICAL at 11:06

## 2023-06-22 RX ADMIN — MUPIROCIN 1 G: 20 OINTMENT TOPICAL at 08:06

## 2023-06-22 RX ADMIN — VALSARTAN 320 MG: 80 TABLET, FILM COATED ORAL at 09:06

## 2023-06-22 RX ADMIN — CALCIUM CARBONATE 1000 MG: 500 TABLET, CHEWABLE ORAL at 08:06

## 2023-06-22 RX ADMIN — AMLODIPINE BESYLATE 10 MG: 5 TABLET ORAL at 09:06

## 2023-06-22 NOTE — OP NOTE
[facility]  Surgery Department  Operative Note    SUMMARY     Date of Procedure: 6/22/2023     Procedure: Procedure(s) (LRB):  Insertion,catheter,tunneled (N/A)     Surgeon(s) and Role:     * Everett Caicedo MD - Primary    Assisting Surgeon: None    Pre-Operative Diagnosis: Acute kidney injury [N17.9]    Post-Operative Diagnosis: Post-Op Diagnosis Codes:     * Acute kidney injury [N17.9]    Anesthesia: General    Operative Findings (including complications, if any):  End-stage renal disease    Description of Technical Procedures:  Right internal jugular vein tunneled dialysis catheter placement    Right jugular vein accessed under ultrasound guidance on the 1st pass guidewire passed under fluoroscopy into the right atrium.  Catheter measured to the mid sternum and tunneled through a counter incision up to the neck incision.  Dilators passed sequentially over the guidewire under fluoroscopy dilator with tear we sheath was advanced over the wire dilator wire removed catheter advanced through the tear we sheath sheath was removed catheter was checked for placement superior vena cava atrial junction had a gentle curve in the neck and aspirated flushed well was sewn in place neck incision was closed with 4-0 Monocryl.    Significant Surgical Tasks Conducted by the Assistant(s), if Applicable:     Estimated Blood Loss (EBL): * No values recorded between 6/22/2023  5:56 PM and 6/22/2023  6:11 PM *           Implants:   Implant Name Type Inv. Item Serial No.  Lot No. LRB No. Used Action   CATHETER HEMOSPLIT 19CM [id number] - [id number]  CATHETER HEMOSPLIT 19CM [id number]   [id number] 1 Implanted       Specimens:   Specimen (24h ago, onward)      None                    Condition: Good    Disposition: PACU - hemodynamically stable.    Attestation: I was present and scrubbed for the entire procedure.

## 2023-06-22 NOTE — PHARMACY MED REC
"      Admission Medication History     The home medication history was taken by Federica Alatorre.    You may go to "Admission" then "Reconcile Home Medications" tabs to review and/or act upon these items.     The home medication list has been updated by the Pharmacy department.   Please read ALL comments highlighted in yellow.   Please address this information as you see fit.    Feel free to contact us if you have any questions or require assistance.          Medications listed below were obtained from: Patient/family and Analytic software- [employer]  No current facility-administered medications on file prior to encounter.     Current Outpatient Medications on File Prior to Encounter   Medication Sig Dispense Refill    amLODIPine (NORVASC) 10 MG tablet Take 1 tablet (10 mg total) by mouth once daily. 90 tablet 3    calcitRIOL (ROCALTROL) 0.25 MCG Cap Take 1 capsule by mouth once daily (Patient taking differently: Take 0.25 mcg by mouth once daily.) 90 capsule 1    calcium carbonate (TUMS) 200 mg calcium (500 mg) chewable tablet Take 2 tablets (1,000 mg total) by mouth 3 (three) times daily with meals. (Patient taking differently: Take 1,000 mg by mouth 3 (three) times daily.) 180 tablet 11    cloNIDine (CATAPRES) 0.3 MG tablet Take 1 tablet (0.3 mg total) by mouth 3 (three) times daily. 270 tablet 1    furosemide (LASIX) 80 MG tablet Take 1 tablet (80 mg total) by mouth 3 (three) times daily with meals. (Patient taking differently: Take 80 mg by mouth 3 (three) times daily.) 90 tablet 11    hydrALAZINE (APRESOLINE) 100 MG tablet Take 1 tablet (100 mg total) by mouth 3 (three) times daily. 90 tablet 11    isosorbide mononitrate (IMDUR) 120 MG 24 hr tablet Take 1 tablet (120 mg total) by mouth once daily. 30 tablet 11    metoprolol succinate (TOPROL-XL) 50 MG 24 hr tablet Take 3 tablets (150 mg total) by mouth once daily. 90 tablet 11    olmesartan (BENICAR) 40 MG tablet Take 1 tablet (40 mg total) by mouth once daily. " 90 tablet 1    sevelamer carbonate (RENVELA) 800 mg Tab Take 2 tablets (1,600 mg total) by mouth 3 (three) times daily with meals. 180 tablet 11    allopurinoL (ZYLOPRIM) 100 MG tablet Take 0.5 tablets (50 mg total) by mouth twice a week. 12 tablet 1             Federica Alatorre  [id number]            .

## 2023-06-22 NOTE — ASSESSMENT & PLAN NOTE
BUN/CR 81/11.9 with estimated GFR 5.0, baseline creatinine around 7-8 up until June 19th, sodium 135, potassium 4.2, anion gap 11, serum bicarb 26 and calcium 8.9, albumin 3.6, consult Nephrology, consult vascular surgery for tunneled dialysis catheter placement, patient NPO after midnight, renal diet tonight, 1.5 L fluid restriction, resume home medications  Discussed with Dr. Caicedo who will perform tunneled dialysis catheter  Social work consulted for dialysis placement

## 2023-06-22 NOTE — ASSESSMENT & PLAN NOTE
Blood pressure on arrival was 176/106, at time of exam 127/79, resume home blood pressure meds, monitor blood pressure closely

## 2023-06-22 NOTE — NURSING
Nurses Note -- 4 Eyes      6/21/2023   9:33 PM      Skin assessed during: Admit      [x] No Altered Skin Integrity Present    [x]Prevention Measures Documented      [] Yes- Altered Skin Integrity Present or Discovered   [] LDA Added if Not in Epic (Describe Wound)   [] New Altered Skin Integrity was Present on Admit and Documented in LDA   [] Wound Image Taken    Wound Care Consulted? No    Attending Nurse:  Clarisse Olmstead RN     Second RN/Staff Member:  [id number]

## 2023-06-22 NOTE — SUBJECTIVE & OBJECTIVE
Interval History:  No acute events overnight.  Plans for tunneled dialysis catheter today.    Review of Systems   All other systems reviewed and are negative.  Objective:     Vital Signs (Most Recent):  Temp: 97.7 °F (36.5 °C) (06/22/23 1100)  Pulse: (!) 59 (06/22/23 1100)  Resp: 14 (06/22/23 1100)  BP: 118/74 (06/22/23 1100)  SpO2: 97 % (06/22/23 1100) Vital Signs (24h Range):  Temp:  [97.5 °F (36.4 °C)-97.9 °F (36.6 °C)] 97.7 °F (36.5 °C)  Pulse:  [52-66] 59  Resp:  [14-18] 14  SpO2:  [94 %-99 %] 97 %  BP: (118-170)/() 118/74     Weight: (!) 136.1 kg (300 lb 0.7 oz)  Body mass index is 38.52 kg/m².    Intake/Output Summary (Last 24 hours) at 6/22/2023 1352  Last data filed at 6/22/2023 1124  Gross per 24 hour   Intake --   Output 2750 ml   Net -2750 ml         Physical Exam  Vitals reviewed.   Constitutional:       Appearance: Normal appearance.   HENT:      Head: Normocephalic and atraumatic.      Nose: Nose normal.      Mouth/Throat:      Mouth: Mucous membranes are moist.   Eyes:      Pupils: Pupils are equal, round, and reactive to light.   Cardiovascular:      Rate and Rhythm: Normal rate and regular rhythm.      Pulses: Normal pulses.      Heart sounds: Normal heart sounds. No murmur heard.    No friction rub. No gallop.   Pulmonary:      Effort: Pulmonary effort is normal. No respiratory distress.      Breath sounds: Normal breath sounds.   Abdominal:      General: Abdomen is flat. Bowel sounds are normal. There is no distension.      Palpations: Abdomen is soft.      Tenderness: There is no abdominal tenderness.   Musculoskeletal:         General: No swelling. Normal range of motion.      Cervical back: Normal range of motion and neck supple.   Skin:     General: Skin is warm and dry.   Neurological:      General: No focal deficit present.      Mental Status: He is alert and oriented to person, place, and time.   Psychiatric:         Mood and Affect: Mood normal.         Behavior: Behavior normal.          Thought Content: Thought content normal.         Judgment: Judgment normal.           Significant Labs: All pertinent labs within the past 24 hours have been reviewed.    Significant Imaging: I have reviewed all pertinent imaging results/findings within the past 24 hours.

## 2023-06-22 NOTE — CONSULTS
INPATIENT NEPHROLOGY CONSULT   [facility] NEPHROLOGY    João Ham  06/22/2023    Reason for consultation:    Esrd    Chief Complaint:   Chief Complaint   Patient presents with    ABNORMAL LABS     KIDNEY FUNCTIONS ARE ABNORMAL. PT NOT SURE OF NUMBERS. SENT PER DR SALTER          History of Present Illness:    Per H and P    João Ham is a 42-year-old male with chronic medical problems including CKD stage 5, hypertension,HFpEF, anemia of chronic disease and CLOVIS who presents to the emergency room sent by Nephrology to start dialysis because of increasing BUN/CR.  Patient states that he has been asymptomatic.  He denies shortness or breath, chest pain, increase in swelling, continues to void several times daily, denies headaches or dizziness, abdominal pain or diarrhea, palpitations.  His only complaint is fatigue.     In the ED, vital signs with temperature 97.8°, heart rate 53, blood pressure 127/79, respiratory rate 16 and O2 sat 95%.  BUN/CR 81/11.9, sodium 135, potassium 4.2, serum bicarb 26, anion gap 11, BNP 77 and troponin 19.6, albumin 3.6 calcium 8.9, liver tests within normal limits.  H&H 9.8/30.2, WBC 4.03 and platelets 260.  Chest x-ray was negative for anything acute, 12 lead EKG with sinus bradycardia, ventricular rate 54 and .  He will be admitted to the hospitalist service for further evaluation and treatment with consult to Nephrology and vascular surgery.             Plan of Care:       Assessment:    ESRD  --consult for hemosplit catheter  --consult case management for outpatient dialysis placement  -- I have discussed the risks and benefits of hemodialysis with the patient.  All of their questions were answered.  Risks include low blood pressure, stroke, heart attack, seizure, infection, nausea, delirium, and death.  He expressed understanding.  All questions answered to his apparent satisfaction    Hyperphosphatemia  --renal diet  --continue Sevelamer with  meals    Hypertension  --continue outpatient medication    Anemia  --add erythropoietin stimulating agent when bp is better controlled    Secondary hyperparathyroidism  --low phos diet  --continue calcitriol    Thank you for allowing us to participate in this patient's care. We will continue to follow.    Vital Signs:  Temp Readings from Last 3 Encounters:   06/22/23 97.5 °F (36.4 °C) (Oral)   04/19/23 99 °F (37.2 °C) (Oral)   03/03/23 98.4 °F (36.9 °C) (Oral)       Pulse Readings from Last 3 Encounters:   06/22/23 (!) 55   05/25/23 71   04/19/23 78       BP Readings from Last 3 Encounters:   06/22/23 (!) 167/108   05/25/23 (!) 176/101   04/19/23 (!) 142/90       Weight:  Wt Readings from Last 3 Encounters:   06/21/23 (!) 136.1 kg (300 lb 0.7 oz)   05/25/23 (!) 137 kg (302 lb 0.5 oz)   04/19/23 (!) 137 kg (302 lb 0.5 oz)       Past Medical & Surgical History:  Past Medical History:   Diagnosis Date    Allergy     Anemia     Anxiety     CHF (congestive heart failure)     Depression     High blood pressure with chronic kidney disease, stage 5 chronic kidney disease or end stage renal disease     Hypertension        Past Surgical History:   Procedure Laterality Date    HERNIA REPAIR Right     With mesh       Past Social History:  Social History     Socioeconomic History    Marital status: Single   Tobacco Use    Smoking status: Never     Passive exposure: Never    Smokeless tobacco: Never   Substance and Sexual Activity    Alcohol use: Never    Drug use: Never    Sexual activity: Not Currently   Social History Narrative    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     Social Determinants of Health     Financial Resource Strain: High Risk    Difficulty of Paying Living Expenses: Hard   Food Insecurity: No Food Insecurity    Worried About Running Out of Food in the Last Year: Never true    Ran Out of Food in the Last Year: Never true   Transportation Needs: No Transportation Needs    Lack of Transportation (Medical): No    Lack of  Transportation (Non-Medical): No   Physical Activity: Sufficiently Active    Days of Exercise per Week: 6 days    Minutes of Exercise per Session: 60 min   Stress: Stress Concern Present    Feeling of Stress : Very much   Social Connections: Socially Isolated    Frequency of Communication with Friends and Family: More than three times a week    Frequency of Social Gatherings with Friends and Family: More than three times a week    Attends Christianity Services: Never    Active Member of Clubs or Organizations: No    Attends Club or Organization Meetings: Never    Marital Status: Never    Housing Stability: Low Risk     Unable to Pay for Housing in the Last Year: No    Number of Places Lived in the Last Year: 2    Unstable Housing in the Last Year: No       Medications:  No current facility-administered medications on file prior to encounter.     Current Outpatient Medications on File Prior to Encounter   Medication Sig Dispense Refill    amLODIPine (NORVASC) 10 MG tablet Take 1 tablet (10 mg total) by mouth once daily. 90 tablet 3    calcitRIOL (ROCALTROL) 0.25 MCG Cap Take 1 capsule by mouth once daily (Patient taking differently: Take 0.25 mcg by mouth once daily.) 90 capsule 1    calcium carbonate (TUMS) 200 mg calcium (500 mg) chewable tablet Take 2 tablets (1,000 mg total) by mouth 3 (three) times daily with meals. (Patient taking differently: Take 1,000 mg by mouth 3 (three) times daily.) 180 tablet 11    cloNIDine (CATAPRES) 0.3 MG tablet Take 1 tablet (0.3 mg total) by mouth 3 (three) times daily. 270 tablet 1    furosemide (LASIX) 80 MG tablet Take 1 tablet (80 mg total) by mouth 3 (three) times daily with meals. (Patient taking differently: Take 80 mg by mouth 3 (three) times daily.) 90 tablet 11    hydrALAZINE (APRESOLINE) 100 MG tablet Take 1 tablet (100 mg total) by mouth 3 (three) times daily. 90 tablet 11    isosorbide mononitrate (IMDUR) 120 MG 24 hr tablet Take 1 tablet (120 mg total) by mouth  "once daily. 30 tablet 11    metoprolol succinate (TOPROL-XL) 50 MG 24 hr tablet Take 3 tablets (150 mg total) by mouth once daily. 90 tablet 11    olmesartan (BENICAR) 40 MG tablet Take 1 tablet (40 mg total) by mouth once daily. 90 tablet 1    sevelamer carbonate (RENVELA) 800 mg Tab Take 2 tablets (1,600 mg total) by mouth 3 (three) times daily with meals. 180 tablet 11    [] allopurinoL (ZYLOPRIM) 100 MG tablet Take 0.5 tablets (50 mg total) by mouth twice a week. 12 tablet 1     Scheduled Meds:   [START ON 2023] allopurinoL  50 mg Oral Every Mon, Fri    amLODIPine  10 mg Oral Daily    calcitRIOL  0.25 mcg Oral Daily    calcium carbonate  1,000 mg Oral TID    cloNIDine  0.3 mg Oral TID    furosemide  80 mg Oral TID AC    hydrALAZINE  100 mg Oral TID    isosorbide mononitrate  120 mg Oral Daily    metoprolol succinate  150 mg Oral Daily    sevelamer carbonate  1,600 mg Oral TID WM    valsartan  320 mg Oral Daily     Continuous Infusions:  PRN Meds:.acetaminophen, melatonin, naloxone, ondansetron, oxyCODONE, oxyCODONE, polyethylene glycol, prochlorperazine, simethicone, sodium chloride 0.9%    Allergies:  Mushroom    Past Family History:  Reviewed; refer to Hospitalist Admission Note    Review of Systems:  Review of Systems - All 14 systems reviewed and negative, except as noted in HPI    Physical Exam:    BP (!) 167/108 Comment: told nurse   Pulse (!) 55   Temp 97.5 °F (36.4 °C) (Oral)   Resp 15   Ht 6' 2" (1.88 m)   Wt (!) 136.1 kg (300 lb 0.7 oz)   SpO2 97%   BMI 38.52 kg/m²     General Appearance:    Alert, cooperative, no distress, appears stated age   Head:    Normocephalic, without obvious abnormality, atraumatic   Eyes:    PER, conjunctiva/corneas clear, EOM's intact in both eyes        Throat:   Lips, mucosa, and tongue normal; teeth and gums normal   Back:     Symmetric, no curvature, ROM normal, no CVA tenderness   Lungs:     Clear to auscultation bilaterally, respirations unlabored "   Chest wall:    No tenderness or deformity   Heart:    Regular rate and rhythm, S1 and S2 normal, no murmur, rub   or gallop   Abdomen:     Soft, non-tender, bowel sounds active all four quadrants,     no masses, no organomegaly   Extremities:   Extremities normal, atraumatic, no cyanosis or edema   Pulses:   2+ and symmetric all extremities   MSK:   No joint or muscle swelling, tenderness or deformity   Skin:   Skin color, texture, turgor normal, no rashes or lesions   Neurologic:   CNII-XII intact, normal strength and sensation       Throughout.  No flap     Results:  Lab Results   Component Value Date     06/22/2023    K 4.3 06/22/2023     06/22/2023    CO2 27 06/22/2023    BUN 81 (H) 06/22/2023    CREATININE 11.8 (H) 06/22/2023    CALCIUM 9.1 06/22/2023    ANIONGAP 11 06/22/2023       Lab Results   Component Value Date    CALCIUM 9.1 06/22/2023    PHOS 8.4 (H) 06/22/2023       Recent Labs   Lab 06/22/23  0446   WBC 4.50   RBC 3.40*   HGB 10.4*   HCT 31.2*      MCV 92   MCH 30.6   MCHC 33.3        Imaging Results              X-Ray Chest PA And Lateral (Final result)  Result time 06/21/23 18:45:20   Procedure changed from X-Ray Chest AP Portable     Final result by Clark Santana MD (06/21/23 18:45:20)                   Narrative:    EXAM DESCRIPTION: XR CHEST PA AND LATERAL    CLINICAL HISTORY: 42 years Male, Acute kidney injury    COMPARISON: None.    FINDINGS: 2 views of the chest were obtained. The heart is at the upper limits of normal in size. The pulmonary vasculature is normal. No consolidations are seen, nor is there evidence of pleural fluid. No osseous lesions are seen.    IMPRESSION:  No acute cardiopulmonary disease is seen.    Electronically signed by:  Clark Santana MD  6/21/2023 6:45 PM CDT Workstation: [id number]                                      I have personally reviewed pertinent radiological imaging and reports.    Patient care was time spent personally by me on the  following activities:   Obtaining a history  Examination of patient.  Providing medical care at the patients bedside.  Developing a treatment plan with patient or surrogate and bedside caregivers  Ordering and reviewing laboratory studies, radiographic studies, pulse oximetry.  Ordering and performing treatments and interventions.  Evaluation of patient's response to treatment.  Discussions with consultants while on the unit and immediately available to the patient.  Re-evaluation of the patient's condition.  Documentation in the medical record.     Mariano Hickey MD  Nephrology  [facility] [place]  [phone]

## 2023-06-22 NOTE — ANESTHESIA PREPROCEDURE EVALUATION
06/22/2023  João Ham is a 42 y.o., male.      Pre-op Assessment    I have reviewed the Patient Summary Reports.     I have reviewed the Nursing Notes. I have reviewed the NPO Status.   I have reviewed the Medications.     Review of Systems  Anesthesia Hx:  No problems with previous Anesthesia  Denies Family Hx of Anesthesia complications.   Denies Personal Hx of Anesthesia complications.   Social:  Non-Smoker    Hematology/Oncology:         -- Anemia:   EENT/Dental:EENT/Dental Normal   Cardiovascular:   Hypertension CHF    Pulmonary:   Sleep Apnea    Education provided regarding risk of obstructive sleep apnea     Renal/:   Chronic Renal Disease, ESRD    Hepatic/GI:  Hepatic/GI Normal    Musculoskeletal:  Musculoskeletal Normal    Neurological:  Neurology Normal    Endocrine:  Endocrine Normal    Psych:   depression          Physical Exam  General: Well nourished    Airway:  Mallampati: IV   Mouth Opening: Normal  TM Distance: Normal  Tongue: Normal  Neck ROM: Normal ROM    Dental:  Intact    Chest/Lungs:  Clear to auscultation, Normal Respiratory Rate    Heart:  Rate: Normal  Rhythm: Regular Rhythm        Anesthesia Plan  Type of Anesthesia, risks & benefits discussed:    Anesthesia Type: Gen Natural Airway  Intra-op Monitoring Plan: Standard ASA Monitors  Post Op Pain Control Plan:   (medical reason for not using multimodal pain management)  Induction:  IV  Informed Consent: Informed consent signed with the Patient and all parties understand the risks and agree with anesthesia plan.  All questions answered.   ASA Score: 4  Anesthesia Plan Notes: General with natural airway.    Propofol  POM    Ready For Surgery From Anesthesia Perspective.     .

## 2023-06-22 NOTE — CONSULTS
João Ham is a 42-year-old male with chronic medical problems including CKD stage 5, hypertension,HFpEF, anemia of chronic disease and CLOVIS who presents to the emergency room sent by Nephrology to start dialysis because of increasing BUN/CR.  Patient states that he has been asymptomatic.  He denies shortness or breath, chest pain, increase in swelling, continues to void several times daily, denies headaches or dizziness, abdominal pain or diarrhea, palpitations.  His only complaint is fatigue.    Previous history of dialysis catheters.  No pacemakers    Neck is supple bilaterally  Chest wall free of any pacemakers or AICD.  2+ radial pulses.      Plan for tunneled dialysis catheter placement today.

## 2023-06-22 NOTE — ASSESSMENT & PLAN NOTE
Patient does not have CPAP yet has been tested offered him CPAP always here in the hospital and he said he did not think he would need it     03-Aug-2019 08:19

## 2023-06-22 NOTE — SUBJECTIVE & OBJECTIVE
Past Medical History:   Diagnosis Date    Allergy     Anemia     Anxiety     CHF (congestive heart failure)     Depression     High blood pressure with chronic kidney disease, stage 5 chronic kidney disease or end stage renal disease     Hypertension        Past Surgical History:   Procedure Laterality Date    HERNIA REPAIR Right     With mesh       Review of patient's allergies indicates:   Allergen Reactions    Mushroom Swelling     Tongue swells        No current facility-administered medications on file prior to encounter.     Current Outpatient Medications on File Prior to Encounter   Medication Sig    amLODIPine (NORVASC) 10 MG tablet Take 1 tablet (10 mg total) by mouth once daily.    calcitRIOL (ROCALTROL) 0.25 MCG Cap Take 1 capsule by mouth once daily (Patient taking differently: Take 0.25 mcg by mouth once daily.)    calcium carbonate (TUMS) 200 mg calcium (500 mg) chewable tablet Take 2 tablets (1,000 mg total) by mouth 3 (three) times daily with meals. (Patient taking differently: Take 1,000 mg by mouth 3 (three) times daily.)    cloNIDine (CATAPRES) 0.3 MG tablet Take 1 tablet (0.3 mg total) by mouth 3 (three) times daily.    furosemide (LASIX) 80 MG tablet Take 1 tablet (80 mg total) by mouth 3 (three) times daily with meals. (Patient taking differently: Take 80 mg by mouth 3 (three) times daily.)    hydrALAZINE (APRESOLINE) 100 MG tablet Take 1 tablet (100 mg total) by mouth 3 (three) times daily.    isosorbide mononitrate (IMDUR) 120 MG 24 hr tablet Take 1 tablet (120 mg total) by mouth once daily.    metoprolol succinate (TOPROL-XL) 50 MG 24 hr tablet Take 3 tablets (150 mg total) by mouth once daily.    olmesartan (BENICAR) 40 MG tablet Take 1 tablet (40 mg total) by mouth once daily.    sevelamer carbonate (RENVELA) 800 mg Tab Take 2 tablets (1,600 mg total) by mouth 3 (three) times daily with meals.    allopurinoL (ZYLOPRIM) 100 MG tablet Take 0.5 tablets (50 mg total) by mouth twice a week.      Family History    None       Tobacco Use    Smoking status: Never     Passive exposure: Never    Smokeless tobacco: Never   Substance and Sexual Activity    Alcohol use: Never    Drug use: Never    Sexual activity: Not Currently     Review of Systems   All other systems reviewed and are negative.  Twelve point review of systems obtained and negative except as stated above in HPI      Objective:     Vital Signs (Most Recent):  Temp: 97.8 °F (36.6 °C) (06/21/23 1755)  Pulse: (!) 53 (06/21/23 1900)  Resp: 16 (06/21/23 1900)  BP: 127/79 (06/21/23 1900)  SpO2: 95 % (06/21/23 1900) Vital Signs (24h Range):  Temp:  [97.8 °F (36.6 °C)] 97.8 °F (36.6 °C)  Pulse:  [53-66] 53  Resp:  [16-18] 16  SpO2:  [94 %-95 %] 95 %  BP: (123-134)/(76-92) 127/79     Weight: 132.5 kg (292 lb)  Body mass index is 37.49 kg/m².     Physical Exam  Vitals and nursing note reviewed.   Constitutional:       General: He is awake. He is not in acute distress.     Appearance: Normal appearance. He is overweight. He is not ill-appearing.      Comments: He is in no acute distress, he is sitting up in bed awake and alert, he is a good historian.   HENT:      Head: Normocephalic.      Right Ear: Hearing normal.      Left Ear: Hearing normal.      Nose: Nose normal.      Mouth/Throat:      Lips: Pink.      Mouth: Mucous membranes are moist.      Pharynx: Oropharynx is clear.   Eyes:      General: Lids are normal. Vision grossly intact. Gaze aligned appropriately.   Cardiovascular:      Rate and Rhythm: Regular rhythm. Bradycardia present.      Pulses: Normal pulses.           Radial pulses are 2+ on the right side and 2+ on the left side.        Dorsalis pedis pulses are 2+ on the right side and 2+ on the left side.      Heart sounds: Normal heart sounds, S1 normal and S2 normal. No murmur heard.  Pulmonary:      Effort: Pulmonary effort is normal. No tachypnea or respiratory distress.      Breath sounds: Normal breath sounds. No transmitted upper  airway sounds. No decreased breath sounds, wheezing, rhonchi or rales.      Comments: He is on room air.  Chest:      Chest wall: No tenderness.   Abdominal:      General: Abdomen is protuberant. Bowel sounds are normal. There is no distension.      Palpations: Abdomen is soft.      Tenderness: There is no abdominal tenderness.      Comments: No tenderness to palpation.   Musculoskeletal:         General: No swelling or deformity. Normal range of motion.      Cervical back: Normal range of motion.      Right lower leg: No edema.      Left lower leg: No edema.      Comments: Moves all extremities with good equal strength.   Skin:     General: Skin is warm and dry.      Capillary Refill: Capillary refill takes less than 2 seconds.   Neurological:      General: No focal deficit present.      Mental Status: He is alert and oriented to person, place, and time.      GCS: GCS eye subscore is 4. GCS verbal subscore is 5. GCS motor subscore is 6.      Sensory: Sensation is intact.      Motor: Motor function is intact.      Coordination: Coordination is intact.   Psychiatric:         Attention and Perception: Attention and perception normal.         Mood and Affect: Mood and affect normal.         Speech: Speech normal.         Behavior: Behavior normal. Behavior is cooperative.         Thought Content: Thought content normal.         Cognition and Memory: Cognition and memory normal.         Judgment: Judgment normal.              Significant Labs: All pertinent labs within the past 24 hours have been reviewed.    Bilirubin:   Recent Labs   Lab 06/21/23  1827   BILITOT 0.6     CBC:   Recent Labs   Lab 06/21/23  1827   WBC 4.03   HGB 9.8*   HCT 30.2*        CMP:   Recent Labs   Lab 06/21/23  1827   *   K 4.2   CL 98   CO2 26   *   BUN 81*   CREATININE 11.9*   CALCIUM 8.9   PROT 7.6   ALBUMIN 3.6   BILITOT 0.6   ALKPHOS 41*   AST 7*   ALT 10   ANIONGAP 11     Cardiac Markers:   Recent Labs   Lab  06/21/23  1827   BNP 77     Magnesium:   Recent Labs   Lab 06/21/23  1827   MG 2.1     Troponin:   Recent Labs   Lab 06/21/23  1827   TROPONINIHS 19.6*       Significant Imaging: I have reviewed all pertinent imaging results/findings within the past 24 hours.      FINDINGS: 2 views of the chest were obtained. The heart is at the upper limits of normal in size. The pulmonary vasculature is normal. No consolidations are seen, nor is there evidence of pleural fluid. No osseous lesions are seen.     IMPRESSION:   No acute cardiopulmonary disease is seen.

## 2023-06-22 NOTE — TRANSFER OF CARE
"Anesthesia Transfer of Care Note    Patient: João Ham    Procedure(s) Performed: Procedure(s) (LRB):  Insertion,catheter,tunneled (N/A)    Patient location: PACU    Anesthesia Type: general    Transport from OR: Transported from OR on room air with adequate spontaneous ventilation    Post pain: adequate analgesia    Post assessment: no apparent anesthetic complications and tolerated procedure well    Post vital signs: stable    Level of consciousness: awake, alert and oriented    Nausea/Vomiting: no nausea/vomiting    Complications: none    Transfer of care protocol was followed      Last vitals:   Visit Vitals  BP (!) 159/96   Pulse (!) 56   Temp 36.4 °C (97.5 °F) (Oral)   Resp 17   Ht 6' 2" (1.88 m)   Wt (!) 136.1 kg (300 lb 0.7 oz)   SpO2 96%   BMI 38.52 kg/m²     "

## 2023-06-22 NOTE — H&P
[facility] - Emergency Dept  Hospital Medicine  History & Physical    Patient Name: João Ham  MRN: [id number]  Patient Class: OP- Observation  Admission Date: 6/21/2023  Attending Physician: Nicky Espinoza MD   Primary Care Provider: Dawson Granado MD         Patient information was obtained from patient, past medical records and ER records.     Subjective:     Principal Problem:Stage 5 chronic kidney disease not on chronic dialysis    Chief Complaint:   Chief Complaint   Patient presents with    ABNORMAL LABS     KIDNEY FUNCTIONS ARE ABNORMAL. PT NOT SURE OF NUMBERS. SENT PER DR SALTER        HPI: João Ham is a 42-year-old male with chronic medical problems including CKD stage 5, hypertension,HFpEF, anemia of chronic disease and CLOVIS who presents to the emergency room sent by Nephrology to start dialysis because of increasing BUN/CR.  Patient states that he has been asymptomatic.  He denies shortness or breath, chest pain, increase in swelling, continues to void several times daily, denies headaches or dizziness, abdominal pain or diarrhea, palpitations.  His only complaint is fatigue.    In the ED, vital signs with temperature 97.8°, heart rate 53, blood pressure 127/79, respiratory rate 16 and O2 sat 95%.  BUN/CR 81/11.9, sodium 135, potassium 4.2, serum bicarb 26, anion gap 11, BNP 77 and troponin 19.6, albumin 3.6 calcium 8.9, liver tests within normal limits.  H&H 9.8/30.2, WBC 4.03 and platelets 260.  Chest x-ray was negative for anything acute, 12 lead EKG with sinus bradycardia, ventricular rate 54 and .  He will be admitted to the hospitalist service for further evaluation and treatment with consult to Nephrology and vascular surgery.        Past Medical History:   Diagnosis Date    Allergy     Anemia     Anxiety     CHF (congestive heart failure)     Depression     High blood pressure with chronic kidney disease, stage 5 chronic kidney disease or end stage renal  disease     Hypertension        Past Surgical History:   Procedure Laterality Date    HERNIA REPAIR Right     With mesh       Review of patient's allergies indicates:   Allergen Reactions    Mushroom Swelling     Tongue swells      Scheduled Meds:   [START ON 6/23/2023] allopurinoL  50 mg Oral Every Mon, Fri    [START ON 6/22/2023] amLODIPine  10 mg Oral Daily    [START ON 6/22/2023] calcitRIOL  0.25 mcg Oral Daily    calcium carbonate  1,000 mg Oral TID    cloNIDine  0.3 mg Oral TID    furosemide  80 mg Oral TID    heparin (porcine)  5,000 Units Subcutaneous Once    [START ON 6/22/2023] hydrALAZINE  100 mg Oral TID    [START ON 6/22/2023] isosorbide mononitrate  120 mg Oral Daily    [START ON 6/22/2023] metoprolol succinate  150 mg Oral Daily    [START ON 6/22/2023] sevelamer carbonate  1,600 mg Oral TID WM    [START ON 6/22/2023] valsartan  320 mg Oral Daily     Continuous Infusions:  PRN Meds:.acetaminophen, melatonin, naloxone, ondansetron, oxyCODONE, oxyCODONE, polyethylene glycol, prochlorperazine, simethicone, sodium chloride 0.9%      Current Outpatient Medications on File Prior to Encounter   Medication Sig    amLODIPine (NORVASC) 10 MG tablet Take 1 tablet (10 mg total) by mouth once daily.    calcitRIOL (ROCALTROL) 0.25 MCG Cap Take 1 capsule by mouth once daily (Patient taking differently: Take 0.25 mcg by mouth once daily.)    calcium carbonate (TUMS) 200 mg calcium (500 mg) chewable tablet Take 2 tablets (1,000 mg total) by mouth 3 (three) times daily with meals. (Patient taking differently: Take 1,000 mg by mouth 3 (three) times daily.)    cloNIDine (CATAPRES) 0.3 MG tablet Take 1 tablet (0.3 mg total) by mouth 3 (three) times daily.    furosemide (LASIX) 80 MG tablet Take 1 tablet (80 mg total) by mouth 3 (three) times daily with meals. (Patient taking differently: Take 80 mg by mouth 3 (three) times daily.)    hydrALAZINE (APRESOLINE) 100 MG tablet Take 1 tablet (100 mg total)  by mouth 3 (three) times daily.    isosorbide mononitrate (IMDUR) 120 MG 24 hr tablet Take 1 tablet (120 mg total) by mouth once daily.    metoprolol succinate (TOPROL-XL) 50 MG 24 hr tablet Take 3 tablets (150 mg total) by mouth once daily.    olmesartan (BENICAR) 40 MG tablet Take 1 tablet (40 mg total) by mouth once daily.    sevelamer carbonate (RENVELA) 800 mg Tab Take 2 tablets (1,600 mg total) by mouth 3 (three) times daily with meals.    allopurinoL (ZYLOPRIM) 100 MG tablet Take 0.5 tablets (50 mg total) by mouth twice a week.     Family History    None       Tobacco Use    Smoking status: Never     Passive exposure: Never    Smokeless tobacco: Never   Substance and Sexual Activity    Alcohol use: Never    Drug use: Never    Sexual activity: Not Currently     Review of Systems   All other systems reviewed and are negative.  Twelve point review of systems obtained and negative except as stated above in HPI      Objective:     Vital Signs (Most Recent):  Temp: 97.8 °F (36.6 °C) (06/21/23 1755)  Pulse: (!) 53 (06/21/23 1900)  Resp: 16 (06/21/23 1900)  BP: 127/79 (06/21/23 1900)  SpO2: 95 % (06/21/23 1900) Vital Signs (24h Range):  Temp:  [97.8 °F (36.6 °C)] 97.8 °F (36.6 °C)  Pulse:  [53-66] 53  Resp:  [16-18] 16  SpO2:  [94 %-95 %] 95 %  BP: (123-134)/(76-92) 127/79     Weight: 132.5 kg (292 lb)  Body mass index is 37.49 kg/m².     Physical Exam  Vitals and nursing note reviewed.   Constitutional:       General: He is awake. He is not in acute distress.     Appearance: Normal appearance. He is overweight. He is not ill-appearing.      Comments: He is in no acute distress, he is sitting up in bed awake and alert, he is a good historian.   HENT:      Head: Normocephalic.      Right Ear: Hearing normal.      Left Ear: Hearing normal.      Nose: Nose normal.      Mouth/Throat:      Lips: Pink.      Mouth: Mucous membranes are moist.      Pharynx: Oropharynx is clear.   Eyes:      General: Lids are  normal. Vision grossly intact. Gaze aligned appropriately.   Cardiovascular:      Rate and Rhythm: Regular rhythm. Bradycardia present.      Pulses: Normal pulses.           Radial pulses are 2+ on the right side and 2+ on the left side.        Dorsalis pedis pulses are 2+ on the right side and 2+ on the left side.      Heart sounds: Normal heart sounds, S1 normal and S2 normal. No murmur heard.  Pulmonary:      Effort: Pulmonary effort is normal. No tachypnea or respiratory distress.      Breath sounds: Normal breath sounds. No transmitted upper airway sounds. No decreased breath sounds, wheezing, rhonchi or rales.      Comments: He is on room air.  Chest:      Chest wall: No tenderness.   Abdominal:      General: Abdomen is protuberant. Bowel sounds are normal. There is no distension.      Palpations: Abdomen is soft.      Tenderness: There is no abdominal tenderness.      Comments: No tenderness to palpation.   Musculoskeletal:         General: No swelling or deformity. Normal range of motion.      Cervical back: Normal range of motion.      Right lower leg: No edema.      Left lower leg: No edema.      Comments: Moves all extremities with good equal strength.   Skin:     General: Skin is warm and dry.      Capillary Refill: Capillary refill takes less than 2 seconds.   Neurological:      General: No focal deficit present.      Mental Status: He is alert and oriented to person, place, and time.      GCS: GCS eye subscore is 4. GCS verbal subscore is 5. GCS motor subscore is 6.      Sensory: Sensation is intact.      Motor: Motor function is intact.      Coordination: Coordination is intact.   Psychiatric:         Attention and Perception: Attention and perception normal.         Mood and Affect: Mood and affect normal.         Speech: Speech normal.         Behavior: Behavior normal. Behavior is cooperative.         Thought Content: Thought content normal.         Cognition and Memory: Cognition and memory  normal.         Judgment: Judgment normal.           Significant Labs: All pertinent labs within the past 24 hours have been reviewed.    Bilirubin:   Recent Labs   Lab 06/21/23 1827   BILITOT 0.6     CBC:   Recent Labs   Lab 06/21/23 1827   WBC 4.03   HGB 9.8*   HCT 30.2*        CMP:   Recent Labs   Lab 06/21/23 1827   *   K 4.2   CL 98   CO2 26   *   BUN 81*   CREATININE 11.9*   CALCIUM 8.9   PROT 7.6   ALBUMIN 3.6   BILITOT 0.6   ALKPHOS 41*   AST 7*   ALT 10   ANIONGAP 11     Cardiac Markers:   Recent Labs   Lab 06/21/23 1827   BNP 77     Magnesium:   Recent Labs   Lab 06/21/23 1827   MG 2.1     Troponin:   Recent Labs   Lab 06/21/23 1827   TROPONINIHS 19.6*       Significant Imaging: I have reviewed all pertinent imaging results/findings within the past 24 hours.      FINDINGS: 2 views of the chest were obtained. The heart is at the upper limits of normal in size. The pulmonary vasculature is normal. No consolidations are seen, nor is there evidence of pleural fluid. No osseous lesions are seen.     IMPRESSION:   No acute cardiopulmonary disease is seen.     Assessment/Plan:     * Stage 5 chronic kidney disease not on chronic dialysis  BUN/CR 81/11.9 with estimated GFR 5.0, baseline creatinine around 7-8 up until June 19th, sodium 135, potassium 4.2, anion gap 11, serum bicarb 26 and calcium 8.9, albumin 3.6, consult Nephrology, consult vascular surgery for tunneled dialysis catheter placement, patient NPO after midnight, renal diet tonight, 1.5 L fluid restriction, resume home medications      Resistant hypertension  Blood pressure on arrival was 176/106, at time of exam 127/79, resume home blood pressure meds, monitor blood pressure closely      Chronic heart failure with preserved ejection fraction  Most recent echocardiogram from October 2022 with estimated PA pressure 48 mmHg, mild concentric left ventricular hypertrophy grade 3 left ventricular diastolic dysfunction and mildly to  moderately reduced right ventricular systolic function with severe left atrial enlargement and moderate pulmonary hypertension, mild-to-moderate mitral regurgitation, moderate-to-severe tricuspid regurgitation, estimated ejection fraction 60%      Sleep apnea  Patient does not have CPAP yet has been tested offered him CPAP always here in the hospital and he said he did not think he would need it      Severe obesity with body mass index (BMI) of 35.0 to 39.9 with comorbidity  Body mass index is 37.49 kg/m². Morbid obesity complicates all aspects of disease management from diagnostic modalities to treatment. Weight loss encouraged and health benefits explained to patient.         Anemia of chronic disease  H&H 9.8/30.2, this has been around baseline, monitor CBC daily        VTE Risk Mitigation (From admission, onward)         Ordered     heparin (porcine) injection 5,000 Units  Once         06/21/23 2048     IP VTE HIGH RISK PATIENT  Once         06/21/23 2038     Place sequential compression device  Until discontinued         06/21/23 2038               Patient was examined at 2016    Code Status: FULL CODE          On 06/21/2023, patient should be placed in hospital observation services under my care in collaboration with Nicky Espinoza MD.      Chelita Martin NP  Department of Hospital Medicine  [facility] - Emergency Dept

## 2023-06-22 NOTE — ASSESSMENT & PLAN NOTE
Most recent echocardiogram from October 2022 with estimated PA pressure 48 mmHg, mild concentric left ventricular hypertrophy grade 3 left ventricular diastolic dysfunction and mildly to moderately reduced right ventricular systolic function with severe left atrial enlargement and moderate pulmonary hypertension, mild-to-moderate mitral regurgitation, moderate-to-severe tricuspid regurgitation, estimated ejection fraction 60%     Opt out

## 2023-06-22 NOTE — INTERVAL H&P NOTE
The patient has been examined and the H&P has been reviewed:    I concur with the findings and no changes have occurred since H&P was written.    Anesthesia risks, benefits and alternative options discussed and understood by patient/family.          Active Hospital Problems    Diagnosis  POA    *Stage 5 chronic kidney disease not on chronic dialysis [N18.5]  Yes    Chronic heart failure with preserved ejection fraction [I50.32]  Yes    Resistant hypertension [I10]  Yes    Sleep apnea [G47.30]  Yes    Severe obesity with body mass index (BMI) of 35.0 to 39.9 with comorbidity [E66.01]  Yes    Anemia of chronic disease [D63.8]  Yes      Resolved Hospital Problems   No resolved problems to display.

## 2023-06-22 NOTE — ASSESSMENT & PLAN NOTE
BUN/CR 81/11.9 with estimated GFR 5.0, baseline creatinine around 7-8 up until June 19th, sodium 135, potassium 4.2, anion gap 11, serum bicarb 26 and calcium 8.9, albumin 3.6, consult Nephrology, consult vascular surgery for tunneled dialysis catheter placement, patient NPO after midnight, renal diet tonight, 1.5 L fluid restriction, resume home medications

## 2023-06-22 NOTE — CONSULTS
Consult for diet education. Verbal diet information provided. Pt reports he had education before. NPO for HD access placement today. Follow tomorrow with renal diet handouts.

## 2023-06-22 NOTE — ED PROVIDER NOTES
Encounter Date: 6/21/2023       History     Chief Complaint   Patient presents with    ABNORMAL LABS     KIDNEY FUNCTIONS ARE ABNORMAL. PT NOT SURE OF NUMBERS. SENT PER DR SALTER     Patient with history of chronic kidney disease.  Patient saw his nephrologist today.  Patient has acute increase in creatinine and BUN.  Patient sent here to initiate hemodialysis.  Patient denies any shortness of breath.  No chest pain.    Review of patient's allergies indicates:   Allergen Reactions    Mushroom Swelling     Tongue swells      Past Medical History:   Diagnosis Date    Allergy     Anemia     Anxiety     CHF (congestive heart failure)     Depression     High blood pressure with chronic kidney disease, stage 5 chronic kidney disease or end stage renal disease     Hypertension      Past Surgical History:   Procedure Laterality Date    HERNIA REPAIR Right     With mesh     No family history on file.  Social History     Tobacco Use    Smoking status: Never     Passive exposure: Never    Smokeless tobacco: Never   Substance Use Topics    Alcohol use: Never    Drug use: Never     Review of Systems   Constitutional:  Negative for chills and fever.   HENT:  Negative for sore throat.    Eyes:  Negative for photophobia and visual disturbance.   Respiratory:  Negative for shortness of breath.    Cardiovascular:  Negative for chest pain.   Gastrointestinal:  Negative for abdominal pain and vomiting.   Genitourinary:  Negative for dysuria.   Musculoskeletal:  Negative for joint swelling.   Skin:  Negative for rash.   Neurological:  Negative for seizures, syncope and headaches.   Psychiatric/Behavioral:  Negative for confusion.      Physical Exam     Initial Vitals [06/21/23 1755]   BP Pulse Resp Temp SpO2   (!) 134/92 (!) 59 18 97.8 °F (36.6 °C) 95 %      MAP       --         Physical Exam    Nursing note and vitals reviewed.  Constitutional: He is not diaphoretic. No distress.   HENT:   Head: Normocephalic and atraumatic.   Eyes:  Conjunctivae are normal.   Neck:   Normal range of motion.  Cardiovascular:  Regular rhythm.           Pulmonary/Chest: Breath sounds normal.   Abdominal: Abdomen is soft. There is no abdominal tenderness.   Musculoskeletal:         General: Normal range of motion.      Cervical back: Normal range of motion.     Neurological: He is alert. He has normal strength. No cranial nerve deficit or sensory deficit.   Skin: No rash noted.   Psychiatric: He has a normal mood and affect.       ED Course   Procedures  Labs Reviewed   CBC W/ AUTO DIFFERENTIAL - Abnormal; Notable for the following components:       Result Value    RBC 3.28 (*)     Hemoglobin 9.8 (*)     Hematocrit 30.2 (*)     All other components within normal limits   COMPREHENSIVE METABOLIC PANEL - Abnormal; Notable for the following components:    Sodium 135 (*)     Glucose 114 (*)     BUN 81 (*)     Creatinine 11.9 (*)     Alkaline Phosphatase 41 (*)     AST 7 (*)     eGFR 5.0 (*)     All other components within normal limits   TROPONIN I HIGH SENSITIVITY - Abnormal; Notable for the following components:    Troponin I High Sensitivity 19.6 (*)     All other components within normal limits   PHOSPHORUS - Abnormal; Notable for the following components:    Phosphorus 7.8 (*)     All other components within normal limits   MAGNESIUM   B-TYPE NATRIURETIC PEPTIDE          Imaging Results              X-Ray Chest PA And Lateral (Final result)  Result time 06/21/23 18:45:20   Procedure changed from X-Ray Chest AP Portable     Final result by Clark Santana MD (06/21/23 18:45:20)                   Narrative:    EXAM DESCRIPTION: XR CHEST PA AND LATERAL    CLINICAL HISTORY: 42 years Male, Acute kidney injury    COMPARISON: None.    FINDINGS: 2 views of the chest were obtained. The heart is at the upper limits of normal in size. The pulmonary vasculature is normal. No consolidations are seen, nor is there evidence of pleural fluid. No osseous lesions are  seen.    IMPRESSION:  No acute cardiopulmonary disease is seen.    Electronically signed by:  Clark Santana MD  6/21/2023 6:45 PM CDT Workstation: [id number]                                     Medications - No data to display  Medical Decision Making:   History:   Old Medical Records: I decided to obtain old medical records.  Old Records Summarized: records from clinic visits and records from previous admission(s).       <> Summary of Records: Patient's office visit today reviewed.  Recent increase from baseline creatinine  Independently Interpreted Test(s):   I have ordered and independently interpreted X-rays - see summary below.       <> Summary of X-Ray Reading(s): No pulmonary edema  I have ordered and independently interpreted EKG Reading(s) - see summary below       <> Summary of EKG Reading(s): Normal sinus rhythm  Clinical Tests:   Lab Tests: Reviewed       <> Summary of Lab: Anemia, elevated BUN creatinine  Radiological Study: Reviewed  Medical Tests: Reviewed  ED Management:  Patient presents with acute increase in creatinine.  Discussed with , on-call for .  They recommend consulting vascular surgery for access placement.  They will initiate hemodialysis.  Hospitalist consulted for admission.                        Clinical Impression:   Final diagnoses:  [N17.9] Acute kidney injury  [N19] Uremia (Primary)        ED Disposition Condition    Admit Stable                Derrick Velasquez MD  06/21/23 1959

## 2023-06-22 NOTE — HPI
João Ham is a 42-year-old male with chronic medical problems including CKD stage 5, hypertension,HFpEF, anemia of chronic disease and CLOVIS who presents to the emergency room sent by Nephrology to start dialysis because of increasing BUN/CR.  Patient states that he has been asymptomatic.  He denies shortness or breath, chest pain, increase in swelling, continues to void several times daily, denies headaches or dizziness, abdominal pain or diarrhea, palpitations.  His only complaint is fatigue.    In the ED, vital signs with temperature 97.8°, heart rate 53, blood pressure 127/79, respiratory rate 16 and O2 sat 95%.  BUN/CR 81/11.9, sodium 135, potassium 4.2, serum bicarb 26, anion gap 11, BNP 77 and troponin 19.6, albumin 3.6 calcium 8.9, liver tests within normal limits.  H&H 9.8/30.2, WBC 4.03 and platelets 260.  Chest x-ray was negative for anything acute, 12 lead EKG with sinus bradycardia, ventricular rate 54 and .  He will be admitted to the hospitalist service for further evaluation and treatment with consult to Nephrology and vascular surgery.

## 2023-06-22 NOTE — PLAN OF CARE
[facility]  Initial Discharge Assessment       Primary Care Provider: Dawson Granado MD    Admission Diagnosis: Uremia [N19]    Admission Date: 6/21/2023  Expected Discharge Date:     Transition of Care Barriers: None    Initial assessment completed with patient at bedside. SW verified facesheet information including PCP, pharmacy, insurance, address, phone numbers, and emergency contacts. Patient reports being independent with all ADLs prior to admission and does not use any DME at home. No HH, dialysis, or coumadin. He reports that his copays for his medications can be difficult to manage and pays an estimated total of $200 per month for them. SW will provide patient with prescription savings information and refer to the pharmacy assistance program. Patient reports that his adult nephew lives with him, and plans to discharge home with family who will transport. No discharge needs anticipated however, SW/CM will continue to follow for any discharge needs that may arise.     Payor: BLUE CROSS BLUE SHIELD / Plan: BCBS ALL OUT OF STATE / Product Type: PPO /     Extended Emergency Contact Information  Primary Emergency Contact: Khushboo Guidry  Mobile Phone: [phone]  Relation: Grandparent  Preferred language: English   needed? No  Secondary Emergency Contact: janae escamilla  Mobile Phone: [phone]  Relation: Daughter    Discharge Plan A: Home with family  Discharge Plan B: Home with family      [facility] [address]  [address] [address]  Phone: [phone] Fax: [phone]      Initial Assessment (most recent)       Adult Discharge Assessment - 06/22/23 1134          Discharge Assessment    Assessment Type Discharge Planning Assessment     Confirmed/corrected address, phone number and insurance Yes     Confirmed Demographics Correct on Facesheet     Source of Information patient     When was your last doctors appointment? --    Unknown date May 2023    Reason For Admission Uremia - Stage 5 chronic kidney disease not on chronis dialysis     People in Home other relative(s)   Adult nephew    Facility Arrived From: Home     Do you expect to return to your current living situation? Yes     Do you have help at home or someone to help you manage your care at home? No     Prior to hospitilization cognitive status: Alert/Oriented;No Deficits     Current cognitive status: Alert/Oriented;No Deficits     Walking or Climbing Stairs --   Independent with ADLs    Dressing/Bathing --   Independent with ADLs    Do you have any problems with: --   N/A    Home Accessibility wheelchair accessible     Home Layout Able to live on 1st floor     Equipment Currently Used at Home none     Readmission within 30 days? No     Patient currently being followed by outpatient case management? No     Do you currently have service(s) that help you manage your care at home? No     Do you take prescription medications? Yes     Do you have prescription coverage? Yes     Coverage BCBS All Out of State     Do you have any problems affording any of your prescribed medications? --   Copays are difficult to manage. Est. $200 per month    Is the patient taking medications as prescribed? yes     Who is going to help you get home at discharge? Family     How do you get to doctors appointments? car, drives self     Are you on dialysis? No     Do you take coumadin? No     Discharge Plan A Home with family     Discharge Plan B Home with family     DME Needed Upon Discharge  none     Discharge Plan discussed with: Patient     Transition of Care Barriers None        OTHER    Name(s) of People in Home Demetrius (nephew)

## 2023-06-23 DIAGNOSIS — G47.33 OSA (OBSTRUCTIVE SLEEP APNEA): Primary | ICD-10-CM

## 2023-06-23 LAB
ALBUMIN SERPL BCP-MCNC: 3.5 G/DL (ref 3.5–5.2)
ANION GAP SERPL CALC-SCNC: 13 MMOL/L (ref 8–16)
BASOPHILS # BLD AUTO: 0.01 K/UL (ref 0–0.2)
BASOPHILS NFR BLD: 0.2 % (ref 0–1.9)
BUN SERPL-MCNC: 80 MG/DL (ref 6–20)
CALCIUM SERPL-MCNC: 8.7 MG/DL (ref 8.7–10.5)
CHLORIDE SERPL-SCNC: 99 MMOL/L (ref 95–110)
CO2 SERPL-SCNC: 26 MMOL/L (ref 23–29)
CREAT SERPL-MCNC: 12 MG/DL (ref 0.5–1.4)
DIFFERENTIAL METHOD: ABNORMAL
EOSINOPHIL # BLD AUTO: 0.1 K/UL (ref 0–0.5)
EOSINOPHIL NFR BLD: 3.1 % (ref 0–8)
ERYTHROCYTE [DISTWIDTH] IN BLOOD BY AUTOMATED COUNT: 13.2 % (ref 11.5–14.5)
EST. GFR  (NO RACE VARIABLE): 4.9 ML/MIN/1.73 M^2
GLUCOSE SERPL-MCNC: 98 MG/DL (ref 70–110)
HBV CORE AB SERPL QL IA: NEGATIVE
HBV SURFACE AB SER QL: NON REACTIVE
HBV SURFACE AG SERPL QL IA: NEGATIVE
HCT VFR BLD AUTO: 30.5 % (ref 40–54)
HGB BLD-MCNC: 10.1 G/DL (ref 14–18)
IMM GRANULOCYTES # BLD AUTO: 0.01 K/UL (ref 0–0.04)
IMM GRANULOCYTES NFR BLD AUTO: 0.2 % (ref 0–0.5)
LYMPHOCYTES # BLD AUTO: 1.2 K/UL (ref 1–4.8)
LYMPHOCYTES NFR BLD: 27.2 % (ref 18–48)
MAGNESIUM SERPL-MCNC: 2.2 MG/DL (ref 1.6–2.6)
MCH RBC QN AUTO: 30.4 PG (ref 27–31)
MCHC RBC AUTO-ENTMCNC: 33.1 G/DL (ref 32–36)
MCV RBC AUTO: 92 FL (ref 82–98)
MONOCYTES # BLD AUTO: 0.6 K/UL (ref 0.3–1)
MONOCYTES NFR BLD: 14.3 % (ref 4–15)
NEUTROPHILS # BLD AUTO: 2.3 K/UL (ref 1.8–7.7)
NEUTROPHILS NFR BLD: 55 % (ref 38–73)
NRBC BLD-RTO: 0 /100 WBC
PHOSPHATE SERPL-MCNC: 8 MG/DL (ref 2.7–4.5)
PLATELET # BLD AUTO: 249 K/UL (ref 150–450)
PMV BLD AUTO: 9.9 FL (ref 9.2–12.9)
POTASSIUM SERPL-SCNC: 4.2 MMOL/L (ref 3.5–5.1)
RBC # BLD AUTO: 3.32 M/UL (ref 4.6–6.2)
SODIUM SERPL-SCNC: 138 MMOL/L (ref 136–145)
WBC # BLD AUTO: 4.26 K/UL (ref 3.9–12.7)

## 2023-06-23 PROCEDURE — 25000003 PHARM REV CODE 250: Performed by: SURGERY

## 2023-06-23 PROCEDURE — 80069 RENAL FUNCTION PANEL: CPT | Performed by: SURGERY

## 2023-06-23 PROCEDURE — 90935 HEMODIALYSIS ONE EVALUATION: CPT

## 2023-06-23 PROCEDURE — 85025 COMPLETE CBC W/AUTO DIFF WBC: CPT | Performed by: SURGERY

## 2023-06-23 PROCEDURE — 63600175 PHARM REV CODE 636 W HCPCS: Performed by: INTERNAL MEDICINE

## 2023-06-23 PROCEDURE — 25000003 PHARM REV CODE 250: Performed by: INTERNAL MEDICINE

## 2023-06-23 PROCEDURE — 36415 COLL VENOUS BLD VENIPUNCTURE: CPT | Performed by: SURGERY

## 2023-06-23 PROCEDURE — 12000002 HC ACUTE/MED SURGE SEMI-PRIVATE ROOM

## 2023-06-23 PROCEDURE — 83735 ASSAY OF MAGNESIUM: CPT | Performed by: SURGERY

## 2023-06-23 PROCEDURE — 63600175 PHARM REV CODE 636 W HCPCS: Performed by: HOSPITALIST

## 2023-06-23 RX ORDER — HEPARIN SODIUM 1000 [USP'U]/ML
4000 INJECTION, SOLUTION INTRAVENOUS; SUBCUTANEOUS
Status: DISPENSED | OUTPATIENT
Start: 2023-06-23 | End: 2023-06-28

## 2023-06-23 RX ORDER — HYDROCODONE BITARTRATE AND ACETAMINOPHEN 7.5; 325 MG/1; MG/1
1 TABLET ORAL EVERY 6 HOURS PRN
Status: DISCONTINUED | OUTPATIENT
Start: 2023-06-23 | End: 2023-06-24

## 2023-06-23 RX ORDER — HYDROCODONE BITARTRATE AND ACETAMINOPHEN 10; 325 MG/1; MG/1
1 TABLET ORAL EVERY 6 HOURS PRN
Status: DISCONTINUED | OUTPATIENT
Start: 2023-06-23 | End: 2023-06-25

## 2023-06-23 RX ORDER — MORPHINE SULFATE 2 MG/ML
2 INJECTION, SOLUTION INTRAMUSCULAR; INTRAVENOUS ONCE
Status: COMPLETED | OUTPATIENT
Start: 2023-06-23 | End: 2023-06-23

## 2023-06-23 RX ADMIN — HEPARIN SODIUM 4000 UNITS: 1000 INJECTION, SOLUTION INTRAVENOUS; SUBCUTANEOUS at 11:06

## 2023-06-23 RX ADMIN — AMLODIPINE BESYLATE 10 MG: 5 TABLET ORAL at 11:06

## 2023-06-23 RX ADMIN — HYDRALAZINE HYDROCHLORIDE 100 MG: 25 TABLET ORAL at 11:06

## 2023-06-23 RX ADMIN — SEVELAMER CARBONATE 1600 MG: 800 TABLET, FILM COATED ORAL at 04:06

## 2023-06-23 RX ADMIN — CLONIDINE HYDROCHLORIDE 0.3 MG: 0.1 TABLET ORAL at 11:06

## 2023-06-23 RX ADMIN — HYDROCODONE BITARTRATE AND ACETAMINOPHEN 1 TABLET: 7.5; 325 TABLET ORAL at 02:06

## 2023-06-23 RX ADMIN — VALSARTAN 320 MG: 80 TABLET, FILM COATED ORAL at 11:06

## 2023-06-23 RX ADMIN — HYDROCODONE BITARTRATE AND ACETAMINOPHEN 1 TABLET: 10; 325 TABLET ORAL at 09:06

## 2023-06-23 RX ADMIN — MUPIROCIN 1 G: 20 OINTMENT TOPICAL at 11:06

## 2023-06-23 RX ADMIN — MUPIROCIN 1 G: 20 OINTMENT TOPICAL at 09:06

## 2023-06-23 RX ADMIN — FUROSEMIDE 80 MG: 40 TABLET ORAL at 06:06

## 2023-06-23 RX ADMIN — HYDROCODONE BITARTRATE AND ACETAMINOPHEN 1 TABLET: 7.5; 325 TABLET ORAL at 11:06

## 2023-06-23 RX ADMIN — SEVELAMER CARBONATE 1600 MG: 800 TABLET, FILM COATED ORAL at 11:06

## 2023-06-23 RX ADMIN — ALLOPURINOL 50 MG: 300 TABLET ORAL at 04:06

## 2023-06-23 RX ADMIN — CALCIUM CARBONATE 1000 MG: 500 TABLET, CHEWABLE ORAL at 04:06

## 2023-06-23 RX ADMIN — CALCITRIOL CAPSULES 0.25 MCG 0.25 MCG: 0.25 CAPSULE ORAL at 11:06

## 2023-06-23 RX ADMIN — METOPROLOL SUCCINATE 150 MG: 50 TABLET, FILM COATED, EXTENDED RELEASE ORAL at 11:06

## 2023-06-23 RX ADMIN — ISOSORBIDE MONONITRATE 120 MG: 60 TABLET, EXTENDED RELEASE ORAL at 11:06

## 2023-06-23 RX ADMIN — CALCIUM CARBONATE 1000 MG: 500 TABLET, CHEWABLE ORAL at 09:06

## 2023-06-23 RX ADMIN — FUROSEMIDE 80 MG: 40 TABLET ORAL at 11:06

## 2023-06-23 RX ADMIN — CALCIUM CARBONATE 1000 MG: 500 TABLET, CHEWABLE ORAL at 11:06

## 2023-06-23 RX ADMIN — MORPHINE SULFATE 2 MG: 2 INJECTION, SOLUTION INTRAMUSCULAR; INTRAVENOUS at 11:06

## 2023-06-23 RX ADMIN — CLONIDINE HYDROCHLORIDE 0.3 MG: 0.1 TABLET ORAL at 04:06

## 2023-06-23 NOTE — ANESTHESIA POSTPROCEDURE EVALUATION
Anesthesia Post Evaluation    Patient: João Ham    Procedure(s) Performed: Procedure(s) (LRB):  Insertion,catheter,tunneled (N/A)    Final Anesthesia Type: general      Patient location during evaluation: PACU  Patient participation: Yes- Able to Participate  Level of consciousness: awake and alert  Post-procedure vital signs: reviewed and stable  Pain management: adequate  Airway patency: patent    PONV status at discharge: No PONV  Anesthetic complications: no      Cardiovascular status: blood pressure returned to baseline and stable  Respiratory status: unassisted and room air  Hydration status: euvolemic  Follow-up not needed.          Vitals Value Taken Time   /96 06/22/23 1900   Temp 36.1 °C (97 °F) 06/22/23 1815   Pulse 54 06/22/23 1901   Resp 17 06/22/23 1900   SpO2 97 % 06/22/23 1901   Vitals shown include unvalidated device data.      Event Time   Out of Recovery 06/22/2023 19:01:00         Pain/Evans Score: Evans Score: 10 (6/22/2023  6:45 PM)

## 2023-06-23 NOTE — SUBJECTIVE & OBJECTIVE
Interval History:  No acute events overnight.  Feeling well today.  Had tunneled dialysis catheter placed yesterday.  Started dialysis today.  Working on dialysis chair per social work    Review of Systems   All other systems reviewed and are negative.  Objective:     Vital Signs (Most Recent):  Temp: 98.2 °F (36.8 °C) (06/23/23 1202)  Pulse: (!) 57 (06/23/23 1202)  Resp: 17 (06/23/23 1202)  BP: (!) 152/90 (told nurse) (06/23/23 1202)  SpO2: 96 % (06/23/23 1202) Vital Signs (24h Range):  Temp:  [97 °F (36.1 °C)-98.6 °F (37 °C)] 98.2 °F (36.8 °C)  Pulse:  [52-60] 57  Resp:  [12-20] 17  SpO2:  [93 %-97 %] 96 %  BP: (125-172)/() 152/90     Weight: (!) 136.1 kg (300 lb 0.7 oz)  Body mass index is 38.52 kg/m².    Intake/Output Summary (Last 24 hours) at 6/23/2023 1226  Last data filed at 6/23/2023 1120  Gross per 24 hour   Intake 1050 ml   Output 1100 ml   Net -50 ml           Physical Exam  Vitals reviewed.   Constitutional:       Appearance: Normal appearance.   HENT:      Head: Normocephalic and atraumatic.      Nose: Nose normal.      Mouth/Throat:      Mouth: Mucous membranes are moist.   Eyes:      Pupils: Pupils are equal, round, and reactive to light.   Cardiovascular:      Rate and Rhythm: Normal rate and regular rhythm.      Pulses: Normal pulses.      Heart sounds: Normal heart sounds. No murmur heard.    No friction rub. No gallop.   Pulmonary:      Effort: Pulmonary effort is normal. No respiratory distress.      Breath sounds: Normal breath sounds.      Comments: Status post tunneled dialysis catheter  Abdominal:      General: Abdomen is flat. Bowel sounds are normal. There is no distension.      Palpations: Abdomen is soft.      Tenderness: There is no abdominal tenderness.   Musculoskeletal:         General: No swelling. Normal range of motion.      Cervical back: Normal range of motion and neck supple.   Skin:     General: Skin is warm and dry.   Neurological:      General: No focal deficit  present.      Mental Status: He is alert and oriented to person, place, and time.   Psychiatric:         Mood and Affect: Mood normal.         Behavior: Behavior normal.         Thought Content: Thought content normal.         Judgment: Judgment normal.           Significant Labs: All pertinent labs within the past 24 hours have been reviewed.    Significant Imaging: I have reviewed all pertinent imaging results/findings within the past 24 hours.

## 2023-06-23 NOTE — ASSESSMENT & PLAN NOTE
BUN/CR 81/11.9 with estimated GFR 5.0, baseline creatinine around 7-8 up until June 19th, sodium 135, potassium 4.2, anion gap 11, serum bicarb 26 and calcium 8.9, albumin 3.6, consult Nephrology, consult vascular surgery for tunneled dialysis catheter placement, patient NPO after midnight, renal diet tonight, 1.5 L fluid restriction, resume home medications  Tunneled dialysis catheter placed  Social work consulted for dialysis placement pending  Started dialysis today  Appreciate nephrology recommendations

## 2023-06-23 NOTE — PLAN OF CARE
06/23/23 1149   Post-Acute Status   Post-Acute Authorization Dialysis   Diaylsis Status Referrals Sent     Need for HD noted and sent to [facility] in [facility] for placement.  Cm to follow for completion.    13:06   [facility] Alert: NEED MORE INFO response from [facility] ([id number] D-G) re: Referral [id number] for patient in [facility] [id number]: Interested, but need more information  Hello! We are sending this referral to [facility] for a TTS shift 2.  Also, can you let us know the following:  Does the patient have any acuity concerns (trach, vent, candida auris, carbapenem-resistant enterobacterales , life vest, lvad, etc)?  Is the patient able to sit in a chair that reclines back?  Is the patient able to stand/pivot into a chair?  Has the patient received dialysis services at another OP provider? If yes, where?  How will the patient be transported to treatment (self, family, public transportation)?  How much does the patient weigh?  Is the patient able to sign consents?  Has the patient had a Covid test in the last 10 Days? What was the result?  Is there a set or anticipated discharge date for the patient? Thank you so much!    Cm to get info and return to [facility]

## 2023-06-23 NOTE — PLAN OF CARE
06/23/23 1455   Post-Acute Status   Post-Acute Authorization Dialysis   Diaylsis Status Set-up Complete/Auth obtained     [facility] Alert: [facility] MORE [facility][id number] D-G[facility] re: Referral [id number] for patient in [facility] [id number]: Interested, but need more information  Pt. is accepted at [facility] for MWF 3rd at 2:30pm with a start date of 6/28 at 2:00pm. Please let me know if you would like a welcome letter. Thanks!      Welcome letter requested.

## 2023-06-23 NOTE — CONSULTS
INPATIENT NEPHROLOGY CONSULT   [facility] NEPHROLOGY    João Ham  06/23/2023    Reason for consultation:    Esrd    Chief Complaint:   Chief Complaint   Patient presents with    ABNORMAL LABS     KIDNEY FUNCTIONS ARE ABNORMAL. PT NOT SURE OF NUMBERS. SENT PER DR SALTER          History of Present Illness:    Per H and P    João Ham is a 42-year-old male with chronic medical problems including CKD stage 5, hypertension,HFpEF, anemia of chronic disease and CLOVIS who presents to the emergency room sent by Nephrology to start dialysis because of increasing BUN/CR.  Patient states that he has been asymptomatic.  He denies shortness or breath, chest pain, increase in swelling, continues to void several times daily, denies headaches or dizziness, abdominal pain or diarrhea, palpitations.  His only complaint is fatigue.     In the ED, vital signs with temperature 97.8°, heart rate 53, blood pressure 127/79, respiratory rate 16 and O2 sat 95%.  BUN/CR 81/11.9, sodium 135, potassium 4.2, serum bicarb 26, anion gap 11, BNP 77 and troponin 19.6, albumin 3.6 calcium 8.9, liver tests within normal limits.  H&H 9.8/30.2, WBC 4.03 and platelets 260.  Chest x-ray was negative for anything acute, 12 lead EKG with sinus bradycardia, ventricular rate 54 and .  He will be admitted to the hospitalist service for further evaluation and treatment with consult to Nephrology and vascular surgery.      6/23  Patient seen on hemodialysis for uremic clearance and ultrafiltration.               C./o pain at tunneled cath site.  No discharge or swelling.  Catheter functioning fine           Plan of Care:       Assessment:    ESRD  --consult for hemosplit catheter  --consult case management for outpatient dialysis placement  -- I have discussed the risks and benefits of hemodialysis with the patient.  All of their questions were answered.  Risks include low blood pressure, stroke, heart attack, seizure, infection, nausea, delirium, and  death.  He expressed understanding.  All questions answered to his apparent satisfaction    Hyperphosphatemia  --renal diet  --continue Sevelamer with meals    Hypertension  --continue outpatient medication    Anemia  --add erythropoietin stimulating agent when bp is better controlled    Secondary hyperparathyroidism  --low phos diet  --continue calcitriol    Pain. --adjust hydrocodone.      Thank you for allowing us to participate in this patient's care. We will continue to follow.    Vital Signs:  Temp Readings from Last 3 Encounters:   06/23/23 98 °F (36.7 °C) (Oral)   04/19/23 99 °F (37.2 °C) (Oral)   03/03/23 98.4 °F (36.9 °C) (Oral)       Pulse Readings from Last 3 Encounters:   06/23/23 (!) 54   05/25/23 71   04/19/23 78       BP Readings from Last 3 Encounters:   06/23/23 (!) 145/80   05/25/23 (!) 176/101   04/19/23 (!) 142/90       Weight:  Wt Readings from Last 3 Encounters:   06/21/23 (!) 136.1 kg (300 lb 0.7 oz)   05/25/23 (!) 137 kg (302 lb 0.5 oz)   04/19/23 (!) 137 kg (302 lb 0.5 oz)       Past Medical & Surgical History:  Past Medical History:   Diagnosis Date    Allergy     Anemia     Anxiety     CHF (congestive heart failure)     Depression     High blood pressure with chronic kidney disease, stage 5 chronic kidney disease or end stage renal disease     Hypertension        Past Surgical History:   Procedure Laterality Date    HERNIA REPAIR Right     With mesh    INSERTION, CATHETER, TUNNELED N/A 6/22/2023    Procedure: Insertion,catheter,tunneled;  Surgeon: Everett Caicedo MD;  [facility]: [facility];  Service: General;  Laterality: N/A;       Past Social History:  Social History     Socioeconomic History    Marital status: Single   Tobacco Use    Smoking status: Never     Passive exposure: Never    Smokeless tobacco: Never   Substance and Sexual Activity    Alcohol use: Never    Drug use: Never    Sexual activity: Not Currently   Social History Narrative    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 Davis  Determinants of Health     Financial Resource Strain: High Risk    Difficulty of Paying Living Expenses: Hard   Food Insecurity: No Food Insecurity    Worried About Running Out of Food in the Last Year: Never true    Ran Out of Food in the Last Year: Never true   Transportation Needs: No Transportation Needs    Lack of Transportation (Medical): No    Lack of Transportation (Non-Medical): No   Physical Activity: Sufficiently Active    Days of Exercise per Week: 6 days    Minutes of Exercise per Session: 60 min   Stress: Stress Concern Present    Feeling of Stress : Very much   Social Connections: Socially Isolated    Frequency of Communication with Friends and Family: More than three times a week    Frequency of Social Gatherings with Friends and Family: More than three times a week    Attends Gnosticist Services: Never    Active Member of Clubs or Organizations: No    Attends Club or Organization Meetings: Never    Marital Status: Never    Housing Stability: Low Risk     Unable to Pay for Housing in the Last Year: No    Number of Places Lived in the Last Year: 2    Unstable Housing in the Last Year: No       Medications:  No current facility-administered medications on file prior to encounter.     Current Outpatient Medications on File Prior to Encounter   Medication Sig Dispense Refill    amLODIPine (NORVASC) 10 MG tablet Take 1 tablet (10 mg total) by mouth once daily. 90 tablet 3    calcitRIOL (ROCALTROL) 0.25 MCG Cap Take 1 capsule by mouth once daily (Patient taking differently: Take 0.25 mcg by mouth once daily.) 90 capsule 1    calcium carbonate (TUMS) 200 mg calcium (500 mg) chewable tablet Take 2 tablets (1,000 mg total) by mouth 3 (three) times daily with meals. (Patient taking differently: Take 1,000 mg by mouth 3 (three) times daily.) 180 tablet 11    cloNIDine (CATAPRES) 0.3 MG tablet Take 1 tablet (0.3 mg total) by mouth 3 (three) times daily. 270 tablet 1    furosemide (LASIX) 80 MG tablet Take  "1 tablet (80 mg total) by mouth 3 (three) times daily with meals. (Patient taking differently: Take 80 mg by mouth 3 (three) times daily.) 90 tablet 11    hydrALAZINE (APRESOLINE) 100 MG tablet Take 1 tablet (100 mg total) by mouth 3 (three) times daily. 90 tablet 11    isosorbide mononitrate (IMDUR) 120 MG 24 hr tablet Take 1 tablet (120 mg total) by mouth once daily. 30 tablet 11    metoprolol succinate (TOPROL-XL) 50 MG 24 hr tablet Take 3 tablets (150 mg total) by mouth once daily. 90 tablet 11    olmesartan (BENICAR) 40 MG tablet Take 1 tablet (40 mg total) by mouth once daily. 90 tablet 1    sevelamer carbonate (RENVELA) 800 mg Tab Take 2 tablets (1,600 mg total) by mouth 3 (three) times daily with meals. 180 tablet 11     Scheduled Meds:   allopurinoL  50 mg Oral Every Mon, Fri    amLODIPine  10 mg Oral Daily    calcitRIOL  0.25 mcg Oral Daily    calcium carbonate  1,000 mg Oral TID    cloNIDine  0.3 mg Oral TID    furosemide  80 mg Oral TID AC    hydrALAZINE  100 mg Oral TID    isosorbide mononitrate  120 mg Oral Daily    metoprolol succinate  150 mg Oral Daily    mupirocin   Nasal BID    sevelamer carbonate  1,600 mg Oral TID WM    valsartan  320 mg Oral Daily     Continuous Infusions:  PRN Meds:.acetaminophen, heparin (porcine), HYDROcodone-acetaminophen, HYDROcodone-acetaminophen, melatonin, naloxone, ondansetron, oxyCODONE, polyethylene glycol, prochlorperazine, simethicone, sodium chloride 0.9%    Allergies:  Mushroom    Past Family History:  Reviewed; refer to Hospitalist Admission Note    Review of Systems:  Review of Systems - All 14 systems reviewed and negative, except as noted in HPI    Physical Exam:    BP (!) 145/80   Pulse (!) 54   Temp 98 °F (36.7 °C) (Oral)   Resp 20   Ht 6' 2" (1.88 m)   Wt (!) 136.1 kg (300 lb 0.7 oz)   SpO2 96%   BMI 38.52 kg/m²     General Appearance:    Alert, cooperative, no distress, appears stated age   Head:    Normocephalic, without obvious abnormality, " atraumatic   Eyes:    PER, conjunctiva/corneas clear, EOM's intact in both eyes        Throat:   Lips, mucosa, and tongue normal; teeth and gums normal   Back:     Symmetric, no curvature, ROM normal, no CVA tenderness   Lungs:     Clear to auscultation bilaterally, respirations unlabored   Chest wall:    No tenderness or deformity   Heart:    Regular rate and rhythm, S1 and S2 normal, no murmur, rub   or gallop   Abdomen:     Soft, non-tender, bowel sounds active all four quadrants,     no masses, no organomegaly   Extremities:   Extremities normal, atraumatic, no cyanosis or edema   Pulses:   2+ and symmetric all extremities   MSK:   No joint or muscle swelling, tenderness or deformity   Skin:   Skin color, texture, turgor normal, no rashes or lesions   Neurologic:   CNII-XII intact, normal strength and sensation       Throughout.  No flap     Results:  Lab Results   Component Value Date     06/23/2023    K 4.2 06/23/2023    CL 99 06/23/2023    CO2 26 06/23/2023    BUN 80 (H) 06/23/2023    CREATININE 12.0 (H) 06/23/2023    CALCIUM 8.7 06/23/2023    ANIONGAP 13 06/23/2023       Lab Results   Component Value Date    CALCIUM 8.7 06/23/2023    PHOS 8.0 (H) 06/23/2023       Recent Labs   Lab 06/23/23  0536   WBC 4.26   RBC 3.32*   HGB 10.1*   HCT 30.5*      MCV 92   MCH 30.4   MCHC 33.1          Imaging Results              X-Ray Chest PA And Lateral (Final result)  Result time 06/21/23 18:45:20   Procedure changed from X-Ray Chest AP Portable     Final result by Clark Santana MD (06/21/23 18:45:20)                   Narrative:    EXAM DESCRIPTION: XR CHEST PA AND LATERAL    CLINICAL HISTORY: 42 years Male, Acute kidney injury    COMPARISON: None.    FINDINGS: 2 views of the chest were obtained. The heart is at the upper limits of normal in size. The pulmonary vasculature is normal. No consolidations are seen, nor is there evidence of pleural fluid. No osseous lesions are seen.    IMPRESSION:  No acute  cardiopulmonary disease is seen.    Electronically signed by:  Clark Santana MD  6/21/2023 6:45 PM CDT Workstation: [id number]                                      I have personally reviewed pertinent radiological imaging and reports.    Patient care was time spent personally by me on the following activities:   Obtaining a history  Examination of patient.  Providing medical care at the patients bedside.  Developing a treatment plan with patient or surrogate and bedside caregivers  Ordering and reviewing laboratory studies, radiographic studies, pulse oximetry.  Ordering and performing treatments and interventions.  Evaluation of patient's response to treatment.  Discussions with consultants while on the unit and immediately available to the patient.  Re-evaluation of the patient's condition.  Documentation in the medical record.     Mariano Hickey MD  Nephrology  [facility] [place]  [phone]

## 2023-06-23 NOTE — PLAN OF CARE
Problem: Adult Inpatient Plan of Care  Goal: Plan of Care Review  Outcome: Ongoing, Not Progressing  Goal: Patient-Specific Goal (Individualized)  Outcome: Ongoing, Not Progressing  Goal: Absence of Hospital-Acquired Illness or Injury  Outcome: Ongoing, Not Progressing  Goal: Optimal Comfort and Wellbeing  Outcome: Ongoing, Not Progressing  Goal: Readiness for Transition of Care  Outcome: Ongoing, Not Progressing     Problem: Device-Related Complication Risk (Hemodialysis)  Goal: Safe, Effective Therapy Delivery  Outcome: Ongoing, Not Progressing     Problem: Hemodynamic Instability (Hemodialysis)  Goal: Effective Tissue Perfusion  Outcome: Ongoing, Not Progressing     Problem: Infection (Hemodialysis)  Goal: Absence of Infection Signs and Symptoms  Outcome: Ongoing, Not Progressing     Problem: Infection  Goal: Absence of Infection Signs and Symptoms  Outcome: Ongoing, Not Progressing

## 2023-06-23 NOTE — PROGRESS NOTES
06/23/23 1120   Vital Signs   Temp 98.6 °F (37 °C)   Temp Source Oral   Pulse (!) 53   Heart Rate Source Monitor   Resp 20   SpO2 (!) 94 %   Pulse Oximetry Type Intermittent   Device (Oxygen Therapy) room air   BP (!) 143/77   BP Location Right arm   BP Method Automatic   Patient Position Lying   Assessments (Pre/Post)   Consent Obtained yes   Date Hepatitis Profile Obtained 06/22/23   Blood Liters Processed (BLP) 45.3   Transport Modality bed   Level of Consciousness (AVPU) alert        Hemodialysis Catheter 06/22/23 1809 right internal jugular   Placement Date/Time: 06/22/23 1809   Present Prior to Hospital Arrival?: No  Hand Hygiene: Performed  Barrier Precautions: Performed  Skin Antisepsis: ChloraPrep  Hemodialysis Catheter Type: Tunneled catheter  Location: right internal jugular  Cathete...   Line Necessity Review CRRT/HD   Verification by X-ray Yes   Site Assessment No drainage;No redness;No swelling;No warmth   Line Securement Device Secured with sutures   Dressing Type CHG impregnated dressing/sponge;Central line dressing   Dressing Status Clean;Dry;Intact   Dressing Intervention First dressing   Date on Dressing 06/23/23   Dressing Due to be Changed 06/29/23   Venous Patency/Care flushed w/o difficulty;heparin locked  (Capped)   Arterial Patency/Care flushed w/o difficulty;heparin locked  (Capped)   Post-Hemodialysis Assessment   Rinseback Volume (mL) 250 mL   Blood Volume Processed (Liters) 45.3 L   Dialyzer Clearance Lightly streaked   Duration of Treatment 150 minutes   Additional Fluid Intake (mL) 500 mL   Total UF (mL) 500 mL   Net Fluid Removal 0   Patient Response to Treatment Tolerated   Post-Treatment Weight 133.2 kg (293 lb 10.4 oz)   Treatment Weight Change -0.7   Post-Hemodialysis Comments HD complete/Blood reinfused per protocol     Pt educated on Dialysis and New Cath care.     1125  Report given to Katerine BENITEZ

## 2023-06-23 NOTE — PROGRESS NOTES
[facility] Medicine  Progress Note    Patient Name: João Ham  MRN: [id number]  Patient Class: IP- Inpatient   Admission Date: 6/21/2023  Length of Stay: 1 days  Attending Physician: Chris Gomez MD  Primary Care Provider: Dawson Granado MD        Subjective:     Principal Problem:Stage 5 chronic kidney disease not on chronic dialysis        HPI:  João Ham is a 42-year-old male with chronic medical problems including CKD stage 5, hypertension,HFpEF, anemia of chronic disease and CLOVIS who presents to the emergency room sent by Nephrology to start dialysis because of increasing BUN/CR.  Patient states that he has been asymptomatic.  He denies shortness or breath, chest pain, increase in swelling, continues to void several times daily, denies headaches or dizziness, abdominal pain or diarrhea, palpitations.  His only complaint is fatigue.    In the ED, vital signs with temperature 97.8°, heart rate 53, blood pressure 127/79, respiratory rate 16 and O2 sat 95%.  BUN/CR 81/11.9, sodium 135, potassium 4.2, serum bicarb 26, anion gap 11, BNP 77 and troponin 19.6, albumin 3.6 calcium 8.9, liver tests within normal limits.  H&H 9.8/30.2, WBC 4.03 and platelets 260.  Chest x-ray was negative for anything acute, 12 lead EKG with sinus bradycardia, ventricular rate 54 and .  He will be admitted to the hospitalist service for further evaluation and treatment with consult to Nephrology and vascular surgery.        Overview/Hospital Course:  No notes on file    Interval History:  No acute events overnight.  Feeling well today.  Had tunneled dialysis catheter placed yesterday.  Started dialysis today.  Working on dialysis chair per social work    Review of Systems   All other systems reviewed and are negative.  Objective:     Vital Signs (Most Recent):  Temp: 98.2 °F (36.8 °C) (06/23/23 1202)  Pulse: (!) 57 (06/23/23 1202)  Resp: 17 (06/23/23 1202)  BP: (!) 152/90 (told nurse)  (06/23/23 1202)  SpO2: 96 % (06/23/23 1202) Vital Signs (24h Range):  Temp:  [97 °F (36.1 °C)-98.6 °F (37 °C)] 98.2 °F (36.8 °C)  Pulse:  [52-60] 57  Resp:  [12-20] 17  SpO2:  [93 %-97 %] 96 %  BP: (125-172)/() 152/90     Weight: (!) 136.1 kg (300 lb 0.7 oz)  Body mass index is 38.52 kg/m².    Intake/Output Summary (Last 24 hours) at 6/23/2023 1226  Last data filed at 6/23/2023 1120  Gross per 24 hour   Intake 1050 ml   Output 1100 ml   Net -50 ml           Physical Exam  Vitals reviewed.   Constitutional:       Appearance: Normal appearance.   HENT:      Head: Normocephalic and atraumatic.      Nose: Nose normal.      Mouth/Throat:      Mouth: Mucous membranes are moist.   Eyes:      Pupils: Pupils are equal, round, and reactive to light.   Cardiovascular:      Rate and Rhythm: Normal rate and regular rhythm.      Pulses: Normal pulses.      Heart sounds: Normal heart sounds. No murmur heard.    No friction rub. No gallop.   Pulmonary:      Effort: Pulmonary effort is normal. No respiratory distress.      Breath sounds: Normal breath sounds.      Comments: Status post tunneled dialysis catheter  Abdominal:      General: Abdomen is flat. Bowel sounds are normal. There is no distension.      Palpations: Abdomen is soft.      Tenderness: There is no abdominal tenderness.   Musculoskeletal:         General: No swelling. Normal range of motion.      Cervical back: Normal range of motion and neck supple.   Skin:     General: Skin is warm and dry.   Neurological:      General: No focal deficit present.      Mental Status: He is alert and oriented to person, place, and time.   Psychiatric:         Mood and Affect: Mood normal.         Behavior: Behavior normal.         Thought Content: Thought content normal.         Judgment: Judgment normal.           Significant Labs: All pertinent labs within the past 24 hours have been reviewed.    Significant Imaging: I have reviewed all pertinent imaging results/findings  within the past 24 hours.      Assessment/Plan:      * Stage 5 chronic kidney disease not on chronic dialysis  BUN/CR 81/11.9 with estimated GFR 5.0, baseline creatinine around 7-8 up until June 19th, sodium 135, potassium 4.2, anion gap 11, serum bicarb 26 and calcium 8.9, albumin 3.6, consult Nephrology, consult vascular surgery for tunneled dialysis catheter placement, patient NPO after midnight, renal diet tonight, 1.5 L fluid restriction, resume home medications  Tunneled dialysis catheter placed  Social work consulted for dialysis placement pending  Started dialysis today  Appreciate nephrology recommendations      Resistant hypertension  Blood pressure on arrival was 176/106, at time of exam 127/79, resume home blood pressure meds, monitor blood pressure closely      Chronic heart failure with preserved ejection fraction  Most recent echocardiogram from October 2022 with estimated PA pressure 48 mmHg, mild concentric left ventricular hypertrophy grade 3 left ventricular diastolic dysfunction and mildly to moderately reduced right ventricular systolic function with severe left atrial enlargement and moderate pulmonary hypertension, mild-to-moderate mitral regurgitation, moderate-to-severe tricuspid regurgitation, estimated ejection fraction 60%      Sleep apnea  Patient does not have CPAP yet has been tested offered him CPAP always here in the hospital and he said he did not think he would need it      Severe obesity with body mass index (BMI) of 35.0 to 39.9 with comorbidity  Body mass index is 37.49 kg/m². Morbid obesity complicates all aspects of disease management from diagnostic modalities to treatment. Weight loss encouraged and health benefits explained to patient.         Anemia of chronic disease  H&H 9.8/30.2, this has been around baseline, monitor CBC daily        VTE Risk Mitigation (From admission, onward)         Ordered     heparin (porcine) injection 4,000 Units  As needed (PRN)          06/23/23 0947     IP VTE HIGH RISK PATIENT  Once         06/21/23 2038     Place sequential compression device  Until discontinued         06/21/23 2038                Discharge Planning   MARIA ISABEL: 6/25/2023     Code Status: Full Code   Is the patient medically ready for discharge?:     Reason for patient still in hospital (select all that apply): Treatment  Discharge Plan A: Home with family                  Chris Gomez MD  Department of Hospital Medicine   [facility]

## 2023-06-24 PROBLEM — I50.31 ACUTE HEART FAILURE WITH PRESERVED EJECTION FRACTION: Status: RESOLVED | Noted: 2022-10-10 | Resolved: 2023-06-24

## 2023-06-24 PROBLEM — I16.0 MALIGNANT HYPERTENSIVE URGENCY: Status: RESOLVED | Noted: 2022-10-10 | Resolved: 2023-06-24

## 2023-06-24 PROBLEM — N18.6 ESRD (END STAGE RENAL DISEASE): Status: ACTIVE | Noted: 2022-10-10

## 2023-06-24 LAB
ALBUMIN SERPL BCP-MCNC: 3.4 G/DL (ref 3.5–5.2)
ANION GAP SERPL CALC-SCNC: 10 MMOL/L (ref 8–16)
BASOPHILS # BLD AUTO: 0.02 K/UL (ref 0–0.2)
BASOPHILS NFR BLD: 0.4 % (ref 0–1.9)
BUN SERPL-MCNC: 54 MG/DL (ref 6–20)
CALCIUM SERPL-MCNC: 8.3 MG/DL (ref 8.7–10.5)
CHLORIDE SERPL-SCNC: 103 MMOL/L (ref 95–110)
CO2 SERPL-SCNC: 24 MMOL/L (ref 23–29)
CREAT SERPL-MCNC: 9.3 MG/DL (ref 0.5–1.4)
DIFFERENTIAL METHOD: ABNORMAL
EOSINOPHIL # BLD AUTO: 0.2 K/UL (ref 0–0.5)
EOSINOPHIL NFR BLD: 2.7 % (ref 0–8)
ERYTHROCYTE [DISTWIDTH] IN BLOOD BY AUTOMATED COUNT: 13.2 % (ref 11.5–14.5)
EST. GFR  (NO RACE VARIABLE): 6.7 ML/MIN/1.73 M^2
GLUCOSE SERPL-MCNC: 96 MG/DL (ref 70–110)
HCT VFR BLD AUTO: 30.9 % (ref 40–54)
HGB BLD-MCNC: 9.9 G/DL (ref 14–18)
IMM GRANULOCYTES # BLD AUTO: 0.01 K/UL (ref 0–0.04)
IMM GRANULOCYTES NFR BLD AUTO: 0.2 % (ref 0–0.5)
LYMPHOCYTES # BLD AUTO: 1.6 K/UL (ref 1–4.8)
LYMPHOCYTES NFR BLD: 28.9 % (ref 18–48)
MAGNESIUM SERPL-MCNC: 2.1 MG/DL (ref 1.6–2.6)
MCH RBC QN AUTO: 29.8 PG (ref 27–31)
MCHC RBC AUTO-ENTMCNC: 32 G/DL (ref 32–36)
MCV RBC AUTO: 93 FL (ref 82–98)
MONOCYTES # BLD AUTO: 0.9 K/UL (ref 0.3–1)
MONOCYTES NFR BLD: 16.1 % (ref 4–15)
NEUTROPHILS # BLD AUTO: 2.9 K/UL (ref 1.8–7.7)
NEUTROPHILS NFR BLD: 51.7 % (ref 38–73)
NRBC BLD-RTO: 0 /100 WBC
PHOSPHATE SERPL-MCNC: 5.9 MG/DL (ref 2.7–4.5)
PLATELET # BLD AUTO: 236 K/UL (ref 150–450)
PMV BLD AUTO: 10.2 FL (ref 9.2–12.9)
POTASSIUM SERPL-SCNC: 4.3 MMOL/L (ref 3.5–5.1)
RBC # BLD AUTO: 3.32 M/UL (ref 4.6–6.2)
SODIUM SERPL-SCNC: 137 MMOL/L (ref 136–145)
WBC # BLD AUTO: 5.53 K/UL (ref 3.9–12.7)

## 2023-06-24 PROCEDURE — 80069 RENAL FUNCTION PANEL: CPT | Performed by: SURGERY

## 2023-06-24 PROCEDURE — 25000003 PHARM REV CODE 250: Performed by: SURGERY

## 2023-06-24 PROCEDURE — 12000002 HC ACUTE/MED SURGE SEMI-PRIVATE ROOM

## 2023-06-24 PROCEDURE — 63600175 PHARM REV CODE 636 W HCPCS: Performed by: SURGERY

## 2023-06-24 PROCEDURE — 90935 HEMODIALYSIS ONE EVALUATION: CPT

## 2023-06-24 PROCEDURE — 63600175 PHARM REV CODE 636 W HCPCS: Performed by: INTERNAL MEDICINE

## 2023-06-24 PROCEDURE — 36415 COLL VENOUS BLD VENIPUNCTURE: CPT | Performed by: SURGERY

## 2023-06-24 PROCEDURE — 83735 ASSAY OF MAGNESIUM: CPT | Performed by: SURGERY

## 2023-06-24 PROCEDURE — 85025 COMPLETE CBC W/AUTO DIFF WBC: CPT | Performed by: SURGERY

## 2023-06-24 PROCEDURE — 25000003 PHARM REV CODE 250: Performed by: STUDENT IN AN ORGANIZED HEALTH CARE EDUCATION/TRAINING PROGRAM

## 2023-06-24 PROCEDURE — 25000003 PHARM REV CODE 250: Performed by: INTERNAL MEDICINE

## 2023-06-24 PROCEDURE — 63600175 PHARM REV CODE 636 W HCPCS: Performed by: STUDENT IN AN ORGANIZED HEALTH CARE EDUCATION/TRAINING PROGRAM

## 2023-06-24 RX ORDER — HEPARIN SODIUM 5000 [USP'U]/ML
5000 INJECTION, SOLUTION INTRAVENOUS; SUBCUTANEOUS EVERY 8 HOURS
Status: DISCONTINUED | OUTPATIENT
Start: 2023-06-24 | End: 2023-06-29 | Stop reason: HOSPADM

## 2023-06-24 RX ORDER — DIPHENHYDRAMINE HCL 25 MG
25 CAPSULE ORAL EVERY 6 HOURS PRN
Status: DISCONTINUED | OUTPATIENT
Start: 2023-06-24 | End: 2023-06-29 | Stop reason: HOSPADM

## 2023-06-24 RX ORDER — OXYCODONE AND ACETAMINOPHEN 10; 325 MG/1; MG/1
1 TABLET ORAL EVERY 4 HOURS PRN
Status: DISCONTINUED | OUTPATIENT
Start: 2023-06-24 | End: 2023-06-25

## 2023-06-24 RX ORDER — MORPHINE SULFATE 4 MG/ML
4 INJECTION, SOLUTION INTRAMUSCULAR; INTRAVENOUS EVERY 4 HOURS PRN
Status: DISCONTINUED | OUTPATIENT
Start: 2023-06-24 | End: 2023-06-27

## 2023-06-24 RX ADMIN — MORPHINE SULFATE 4 MG: 4 INJECTION, SOLUTION INTRAMUSCULAR; INTRAVENOUS at 05:06

## 2023-06-24 RX ADMIN — CLONIDINE HYDROCHLORIDE 0.3 MG: 0.1 TABLET ORAL at 04:06

## 2023-06-24 RX ADMIN — AMLODIPINE BESYLATE 10 MG: 5 TABLET ORAL at 08:06

## 2023-06-24 RX ADMIN — DIPHENHYDRAMINE HYDROCHLORIDE 25 MG: 25 CAPSULE ORAL at 08:06

## 2023-06-24 RX ADMIN — SEVELAMER CARBONATE 1600 MG: 800 TABLET, FILM COATED ORAL at 04:06

## 2023-06-24 RX ADMIN — CALCIUM CARBONATE 1000 MG: 500 TABLET, CHEWABLE ORAL at 08:06

## 2023-06-24 RX ADMIN — HYDROCODONE BITARTRATE AND ACETAMINOPHEN 1 TABLET: 10; 325 TABLET ORAL at 02:06

## 2023-06-24 RX ADMIN — HYDRALAZINE HYDROCHLORIDE 100 MG: 25 TABLET ORAL at 09:06

## 2023-06-24 RX ADMIN — HYDROCODONE BITARTRATE AND ACETAMINOPHEN 1 TABLET: 10; 325 TABLET ORAL at 03:06

## 2023-06-24 RX ADMIN — HEPARIN SODIUM 5000 UNITS: 5000 INJECTION INTRAVENOUS; SUBCUTANEOUS at 09:06

## 2023-06-24 RX ADMIN — CLONIDINE HYDROCHLORIDE 0.3 MG: 0.1 TABLET ORAL at 09:06

## 2023-06-24 RX ADMIN — ACETAMINOPHEN 650 MG: 325 TABLET ORAL at 10:06

## 2023-06-24 RX ADMIN — HEPARIN SODIUM 5000 UNITS: 5000 INJECTION INTRAVENOUS; SUBCUTANEOUS at 04:06

## 2023-06-24 RX ADMIN — FUROSEMIDE 80 MG: 40 TABLET ORAL at 04:06

## 2023-06-24 RX ADMIN — CALCITRIOL CAPSULES 0.25 MCG 0.25 MCG: 0.25 CAPSULE ORAL at 08:06

## 2023-06-24 RX ADMIN — ISOSORBIDE MONONITRATE 120 MG: 60 TABLET, EXTENDED RELEASE ORAL at 08:06

## 2023-06-24 RX ADMIN — HYDROCODONE BITARTRATE AND ACETAMINOPHEN 1 TABLET: 10; 325 TABLET ORAL at 09:06

## 2023-06-24 RX ADMIN — DIPHENHYDRAMINE HYDROCHLORIDE 25 MG: 25 CAPSULE ORAL at 05:06

## 2023-06-24 RX ADMIN — CLONIDINE HYDROCHLORIDE 0.3 MG: 0.1 TABLET ORAL at 08:06

## 2023-06-24 RX ADMIN — METOPROLOL SUCCINATE 150 MG: 50 TABLET, FILM COATED, EXTENDED RELEASE ORAL at 02:06

## 2023-06-24 RX ADMIN — MUPIROCIN 1 G: 20 OINTMENT TOPICAL at 08:06

## 2023-06-24 RX ADMIN — CALCIUM CARBONATE 1000 MG: 500 TABLET, CHEWABLE ORAL at 09:06

## 2023-06-24 RX ADMIN — HEPARIN SODIUM 4000 UNITS: 1000 INJECTION, SOLUTION INTRAVENOUS; SUBCUTANEOUS at 01:06

## 2023-06-24 RX ADMIN — SEVELAMER CARBONATE 1600 MG: 800 TABLET, FILM COATED ORAL at 08:06

## 2023-06-24 RX ADMIN — FUROSEMIDE 80 MG: 40 TABLET ORAL at 05:06

## 2023-06-24 RX ADMIN — MUPIROCIN 1 G: 20 OINTMENT TOPICAL at 09:06

## 2023-06-24 RX ADMIN — CALCIUM CARBONATE 1000 MG: 500 TABLET, CHEWABLE ORAL at 04:06

## 2023-06-24 RX ADMIN — MORPHINE SULFATE 4 MG: 4 INJECTION, SOLUTION INTRAMUSCULAR; INTRAVENOUS at 10:06

## 2023-06-24 RX ADMIN — ONDANSETRON 4 MG: 2 INJECTION INTRAMUSCULAR; INTRAVENOUS at 10:06

## 2023-06-24 NOTE — HOSPITAL COURSE
João Ham is a 42 year old male with a past medical history of CKD V, HTN, HFpEF, obesity, anemia, and CLOVIS who presented with newly diagnosed ESRD necessitating HD initiation. A tunneled HD catheter was placed by Vascular Surgery. Nephrology has been consulted and is managing the HD. The patient has been approved for an HD chair starting 6/28. Discharge is tentatively planned for 6/26 after HD. Home BP medications are currently being adjusted; home valsartan, amlodipine and clonidine have been held.

## 2023-06-24 NOTE — PROGRESS NOTES
"[facility] Medicine  Progress Note    Patient Name: João Ham  MRN: [id number]  Patient Class: IP- Inpatient   Admission Date: 6/21/2023  Length of Stay: 2 days  Attending Physician: Jono Knowles MD  Primary Care Provider: Dawson Granado MD        Subjective:     Principal Problem:ESRD (end stage renal disease)        HPI:  João Ham is a 42-year-old male with chronic medical problems including CKD stage 5, hypertension,HFpEF, anemia of chronic disease and CLOVIS who presents to the emergency room sent by Nephrology to start dialysis because of increasing BUN/CR.  Patient states that he has been asymptomatic.  He denies shortness or breath, chest pain, increase in swelling, continues to void several times daily, denies headaches or dizziness, abdominal pain or diarrhea, palpitations.  His only complaint is fatigue.    In the ED, vital signs with temperature 97.8°, heart rate 53, blood pressure 127/79, respiratory rate 16 and O2 sat 95%.  BUN/CR 81/11.9, sodium 135, potassium 4.2, serum bicarb 26, anion gap 11, BNP 77 and troponin 19.6, albumin 3.6 calcium 8.9, liver tests within normal limits.  H&H 9.8/30.2, WBC 4.03 and platelets 260.  Chest x-ray was negative for anything acute, 12 lead EKG with sinus bradycardia, ventricular rate 54 and .  He will be admitted to the hospitalist service for further evaluation and treatment with consult to Nephrology and vascular surgery.        Overview/Hospital Course:  João Ham is a 42 year old male with a past medical history of CKD V, HTN, HFpEF, obesity, anemia, and CLOVIS who presented with newly diagnosed ESRD necessitating HD initiation. A tunneled HD catheter was placed by Vascular Surgery. Nephrology has been consulted and is managing the HD. The patient has been approved for an HD chair starting 6/28. Discharge is tentatively planned for 6/26 after HD.      Interval History: see "Hospital Course"    Review of Systems "   Genitourinary:  Positive for decreased urine volume.   Musculoskeletal:  Positive for arthralgias and myalgias.   Allergic/Immunologic: Positive for immunocompromised state.   Objective:     Vital Signs (Most Recent):  Temp: 97.9 °F (36.6 °C) (06/24/23 1620)  Pulse: 62 (06/24/23 1620)  Resp: 18 (06/24/23 1620)  BP: 110/67 (06/24/23 1634)  SpO2: (!) 94 % (06/24/23 1620) Vital Signs (24h Range):  Temp:  [97.7 °F (36.5 °C)-98.7 °F (37.1 °C)] 97.9 °F (36.6 °C)  Pulse:  [55-72] 62  Resp:  [16-20] 18  SpO2:  [94 %-99 %] 94 %  BP: (110-146)/() 110/67     Weight: (!) 136.1 kg (300 lb 0.7 oz)  Body mass index is 38.52 kg/m².    Intake/Output Summary (Last 24 hours) at 6/24/2023 1740  Last data filed at 6/24/2023 1320  Gross per 24 hour   Intake 2355 ml   Output 2000 ml   Net 355 ml         Physical Exam  Vitals and nursing note reviewed.   Constitutional:       Appearance: He is obese.   HENT:      Head: Normocephalic and atraumatic.      Right Ear: External ear normal.      Left Ear: External ear normal.      Nose: Nose normal.      Mouth/Throat:      Mouth: Mucous membranes are moist.      Pharynx: Oropharynx is clear.   Eyes:      Extraocular Movements: Extraocular movements intact.      Conjunctiva/sclera: Conjunctivae normal.   Cardiovascular:      Rate and Rhythm: Normal rate and regular rhythm.      Pulses: Normal pulses.      Heart sounds: Normal heart sounds.      Comments: CVC right sided chest.  Pulmonary:      Effort: Pulmonary effort is normal.      Breath sounds: Normal breath sounds.   Abdominal:      General: Bowel sounds are normal.      Palpations: Abdomen is soft.   Musculoskeletal:         General: Normal range of motion.      Cervical back: Normal range of motion and neck supple.      Left lower leg: No edema.   Skin:     General: Skin is warm and dry.   Neurological:      Mental Status: Mental status is at baseline.   Psychiatric:         Mood and Affect: Mood normal.         Behavior: Behavior  normal.           Significant Labs: All pertinent labs within the past 24 hours have been reviewed.    Significant Imaging: I have reviewed all pertinent imaging results/findings within the past 24 hours.        Assessment/Plan:      * ESRD (end stage renal disease)  -HD chair approved  -Nephrology consulted  -Renally dose medications  -Avoid nephrotoxic agents  -Continue sevelamer      Resistant hypertension  -Clonidine  -Hydralazine  -Nitrate  -Metoprolol  -Amlodipine    Chronic heart failure with preserved ejection fraction  -Continue Lasix  -Continue metoprolol  -Continue hydralazine and nitrate  -Monitor K and Mg  -Telemetry  -HD per Nephrology    Sleep apnea  Patient refusing CPAP.      Hyperparathyroidism  -Continue calcium and calcitriol      Severe obesity with body mass index (BMI) of 35.0 to 39.9 with comorbidity  Body mass index is 38.52 kg/m². Morbid obesity complicates all aspects of disease management from diagnostic modalities to treatment.         Anemia of chronic disease  Stable.  -Trend Hgb with CBC      VTE Risk Mitigation (From admission, onward)         Ordered     heparin (porcine) injection 5,000 Units  Every 8 hours         06/24/23 0940     heparin (porcine) injection 4,000 Units  As needed (PRN)         06/23/23 0947     IP VTE HIGH RISK PATIENT  Once         06/21/23 2038     Place sequential compression device  Until discontinued         06/21/23 2038                Discharge Planning   MARIA ISABEL: 6/25/2023     Code Status: Full Code   Is the patient medically ready for discharge?:     Reason for patient still in hospital (select all that apply): Pending disposition  Discharge Plan A: Home with family                  Jono Knowles MD  Department of Hospital Medicine   [facility]

## 2023-06-24 NOTE — CONSULTS
INPATIENT NEPHROLOGY CONSULT   [facility] NEPHROLOGY    João Ham  06/24/2023    Reason for consultation:    Esrd    Chief Complaint:   Chief Complaint   Patient presents with    ABNORMAL LABS     KIDNEY FUNCTIONS ARE ABNORMAL. PT NOT SURE OF NUMBERS. SENT PER DR SALTER          History of Present Illness:    Per H and P    João Ham is a 42-year-old male with chronic medical problems including CKD stage 5, hypertension,HFpEF, anemia of chronic disease and CLOVIS who presents to the emergency room sent by Nephrology to start dialysis because of increasing BUN/CR.  Patient states that he has been asymptomatic.  He denies shortness or breath, chest pain, increase in swelling, continues to void several times daily, denies headaches or dizziness, abdominal pain or diarrhea, palpitations.  His only complaint is fatigue.     In the ED, vital signs with temperature 97.8°, heart rate 53, blood pressure 127/79, respiratory rate 16 and O2 sat 95%.  BUN/CR 81/11.9, sodium 135, potassium 4.2, serum bicarb 26, anion gap 11, BNP 77 and troponin 19.6, albumin 3.6 calcium 8.9, liver tests within normal limits.  H&H 9.8/30.2, WBC 4.03 and platelets 260.  Chest x-ray was negative for anything acute, 12 lead EKG with sinus bradycardia, ventricular rate 54 and .  He will be admitted to the hospitalist service for further evaluation and treatment with consult to Nephrology and vascular surgery.      6/23  Patient seen on hemodialysis for uremic clearance and ultrafiltration.               C./o pain at tunneled cath site.  No discharge or swelling.  Catheter functioning fine    6/24  tolerated hd yesterday.  AFVSS.   Patient seen on hemodialysis for uremic clearance and ultrafiltration.               Plan of Care:       Assessment:    ESRD  --dialysis today  --clear for d/c once outpt hd set up    Hyperphosphatemia  --renal diet  --continue Sevelamer with meals    Hypertension  --continue outpatient  medication    Anemia  --erythropoiesis stimulating agent with renal replacement therapy      Secondary hyperparathyroidism  --low phos diet  --continue calcitriol    Pain. --adjust hydrocodone.      Thank you for allowing us to participate in this patient's care. We will continue to follow.    Vital Signs:  Temp Readings from Last 3 Encounters:   06/24/23 97.7 °F (36.5 °C) (Oral)   04/19/23 99 °F (37.2 °C) (Oral)   03/03/23 98.4 °F (36.9 °C) (Oral)       Pulse Readings from Last 3 Encounters:   06/24/23 (!) 58   05/25/23 71   04/19/23 78       BP Readings from Last 3 Encounters:   06/24/23 124/82   05/25/23 (!) 176/101   04/19/23 (!) 142/90       Weight:  Wt Readings from Last 3 Encounters:   06/21/23 (!) 136.1 kg (300 lb 0.7 oz)   05/25/23 (!) 137 kg (302 lb 0.5 oz)   04/19/23 (!) 137 kg (302 lb 0.5 oz)       Past Medical & Surgical History:  Past Medical History:   Diagnosis Date    Allergy     Anemia     Anxiety     CHF (congestive heart failure)     Depression     High blood pressure with chronic kidney disease, stage 5 chronic kidney disease or end stage renal disease     Hypertension        Past Surgical History:   Procedure Laterality Date    HERNIA REPAIR Right     With mesh    INSERTION, CATHETER, TUNNELED N/A 6/22/2023    Procedure: Insertion,catheter,tunneled;  Surgeon: Everett Caicedo MD;  Location: [facility];  Service: General;  Laterality: N/A;       Past Social History:  Social History     Socioeconomic History    Marital status: Single   Tobacco Use    Smoking status: Never     Passive exposure: Never    Smokeless tobacco: Never   Substance and Sexual Activity    Alcohol use: Never    Drug use: Never    Sexual activity: Not Currently   Social History Narrative    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     Social Determinants of Health     Financial Resource Strain: High Risk    Difficulty of Paying Living Expenses: Hard   Food Insecurity: No Food Insecurity    Worried About Running Out of Food in the Last Year:  Never true    Ran Out of Food in the Last Year: Never true   Transportation Needs: No Transportation Needs    Lack of Transportation (Medical): No    Lack of Transportation (Non-Medical): No   Physical Activity: Sufficiently Active    Days of Exercise per Week: 6 days    Minutes of Exercise per Session: 60 min   Stress: Stress Concern Present    Feeling of Stress : Very much   Social Connections: Socially Isolated    Frequency of Communication with Friends and Family: More than three times a week    Frequency of Social Gatherings with Friends and Family: More than three times a week    Attends Mu-ism Services: Never    Active Member of Clubs or Organizations: No    Attends Club or Organization Meetings: Never    Marital Status: Never    Housing Stability: Low Risk     Unable to Pay for Housing in the Last Year: No    Number of Places Lived in the Last Year: 2    Unstable Housing in the Last Year: No       Medications:  No current facility-administered medications on file prior to encounter.     Current Outpatient Medications on File Prior to Encounter   Medication Sig Dispense Refill    amLODIPine (NORVASC) 10 MG tablet Take 1 tablet (10 mg total) by mouth once daily. 90 tablet 3    calcitRIOL (ROCALTROL) 0.25 MCG Cap Take 1 capsule by mouth once daily (Patient taking differently: Take 0.25 mcg by mouth once daily.) 90 capsule 1    calcium carbonate (TUMS) 200 mg calcium (500 mg) chewable tablet Take 2 tablets (1,000 mg total) by mouth 3 (three) times daily with meals. (Patient taking differently: Take 1,000 mg by mouth 3 (three) times daily.) 180 tablet 11    cloNIDine (CATAPRES) 0.3 MG tablet Take 1 tablet (0.3 mg total) by mouth 3 (three) times daily. 270 tablet 1    furosemide (LASIX) 80 MG tablet Take 1 tablet (80 mg total) by mouth 3 (three) times daily with meals. (Patient taking differently: Take 80 mg by mouth 3 (three) times daily.) 90 tablet 11    hydrALAZINE (APRESOLINE) 100 MG tablet Take 1  "tablet (100 mg total) by mouth 3 (three) times daily. 90 tablet 11    isosorbide mononitrate (IMDUR) 120 MG 24 hr tablet Take 1 tablet (120 mg total) by mouth once daily. 30 tablet 11    metoprolol succinate (TOPROL-XL) 50 MG 24 hr tablet Take 3 tablets (150 mg total) by mouth once daily. 90 tablet 11    olmesartan (BENICAR) 40 MG tablet Take 1 tablet (40 mg total) by mouth once daily. 90 tablet 1    sevelamer carbonate (RENVELA) 800 mg Tab Take 2 tablets (1,600 mg total) by mouth 3 (three) times daily with meals. 180 tablet 11     Scheduled Meds:   allopurinoL  50 mg Oral Every Mon, Fri    amLODIPine  10 mg Oral Daily    calcitRIOL  0.25 mcg Oral Daily    calcium carbonate  1,000 mg Oral TID    cloNIDine  0.3 mg Oral TID    furosemide  80 mg Oral TID AC    hydrALAZINE  100 mg Oral TID    isosorbide mononitrate  120 mg Oral Daily    metoprolol succinate  150 mg Oral Daily    mupirocin   Nasal BID    sevelamer carbonate  1,600 mg Oral TID WM    valsartan  320 mg Oral Daily     Continuous Infusions:  PRN Meds:.acetaminophen, diphenhydrAMINE, heparin (porcine), HYDROcodone-acetaminophen, HYDROcodone-acetaminophen, melatonin, naloxone, ondansetron, polyethylene glycol, prochlorperazine, simethicone, sodium chloride 0.9%    Allergies:  Mushroom    Past Family History:  Reviewed; refer to Hospitalist Admission Note    Review of Systems:  Review of Systems - All 14 systems reviewed and negative, except as noted in HPI    Physical Exam:    /82   Pulse (!) 58   Temp 97.7 °F (36.5 °C) (Oral)   Resp 20   Ht 6' 2" (1.88 m)   Wt (!) 136.1 kg (300 lb 0.7 oz)   SpO2 96%   BMI 38.52 kg/m²     General Appearance:    Alert, cooperative, no distress, appears stated age   Head:    Normocephalic, without obvious abnormality, atraumatic   Eyes:    PER, conjunctiva/corneas clear, EOM's intact in both eyes        Throat:   Lips, mucosa, and tongue normal; teeth and gums normal   Back:     Symmetric, no curvature, ROM normal, " no CVA tenderness   Lungs:     Clear to auscultation bilaterally, respirations unlabored   Chest wall:    No tenderness or deformity   Heart:    Regular rate and rhythm, S1 and S2 normal, no murmur, rub   or gallop   Abdomen:     Soft, non-tender, bowel sounds active all four quadrants,     no masses, no organomegaly   Extremities:   Extremities normal, atraumatic, no cyanosis or edema   Pulses:   2+ and symmetric all extremities   MSK:   No joint or muscle swelling, tenderness or deformity   Skin:   Skin color, texture, turgor normal, no rashes or lesions   Neurologic:   CNII-XII intact, normal strength and sensation       Throughout.  No flap     Results:  Lab Results   Component Value Date     06/24/2023    K 4.3 06/24/2023     06/24/2023    CO2 24 06/24/2023    BUN 54 (H) 06/24/2023    CREATININE 9.3 (H) 06/24/2023    CALCIUM 8.3 (L) 06/24/2023    ANIONGAP 10 06/24/2023       Lab Results   Component Value Date    CALCIUM 8.3 (L) 06/24/2023    PHOS 5.9 (H) 06/24/2023       Recent Labs   Lab 06/24/23  0401   WBC 5.53   RBC 3.32*   HGB 9.9*   HCT 30.9*      MCV 93   MCH 29.8   MCHC 32.0          Imaging Results              X-Ray Chest PA And Lateral (Final result)  Result time 06/21/23 18:45:20   Procedure changed from X-Ray Chest AP Portable     Final result by Clark Santana MD (06/21/23 18:45:20)                   Narrative:    EXAM DESCRIPTION: XR CHEST PA AND LATERAL    CLINICAL HISTORY: 42 years Male, Acute kidney injury    COMPARISON: None.    FINDINGS: 2 views of the chest were obtained. The heart is at the upper limits of normal in size. The pulmonary vasculature is normal. No consolidations are seen, nor is there evidence of pleural fluid. No osseous lesions are seen.    IMPRESSION:  No acute cardiopulmonary disease is seen.    Electronically signed by:  Clark Santana MD  6/21/2023 6:45 PM CDT Workstation: [id number]                                      I have personally reviewed  pertinent radiological imaging and reports.    Patient care was time spent personally by me on the following activities:   Obtaining a history  Examination of patient.  Providing medical care at the patients bedside.  Developing a treatment plan with patient or surrogate and bedside caregivers  Ordering and reviewing laboratory studies, radiographic studies, pulse oximetry.  Ordering and performing treatments and interventions.  Evaluation of patient's response to treatment.  Discussions with consultants while on the unit and immediately available to the patient.  Re-evaluation of the patient's condition.  Documentation in the medical record.     Mariano Hickey MD  Nephrology  [facility] [place]  [phone]

## 2023-06-24 NOTE — ASSESSMENT & PLAN NOTE
-HD chair approved  -Nephrology consulted  -Renally dose medications  -Avoid nephrotoxic agents  -Continue sevelamer

## 2023-06-24 NOTE — SUBJECTIVE & OBJECTIVE
"Interval History: see "Hospital Course"    Review of Systems   Genitourinary:  Positive for decreased urine volume.   Musculoskeletal:  Positive for arthralgias and myalgias.   Allergic/Immunologic: Positive for immunocompromised state.   Objective:     Vital Signs (Most Recent):  Temp: 97.9 °F (36.6 °C) (06/24/23 1620)  Pulse: 62 (06/24/23 1620)  Resp: 18 (06/24/23 1620)  BP: 110/67 (06/24/23 1634)  SpO2: (!) 94 % (06/24/23 1620) Vital Signs (24h Range):  Temp:  [97.7 °F (36.5 °C)-98.7 °F (37.1 °C)] 97.9 °F (36.6 °C)  Pulse:  [55-72] 62  Resp:  [16-20] 18  SpO2:  [94 %-99 %] 94 %  BP: (110-146)/() 110/67     Weight: (!) 136.1 kg (300 lb 0.7 oz)  Body mass index is 38.52 kg/m².    Intake/Output Summary (Last 24 hours) at 6/24/2023 1740  Last data filed at 6/24/2023 1320  Gross per 24 hour   Intake 2355 ml   Output 2000 ml   Net 355 ml         Physical Exam  Vitals and nursing note reviewed.   Constitutional:       Appearance: He is obese.   HENT:      Head: Normocephalic and atraumatic.      Right Ear: External ear normal.      Left Ear: External ear normal.      Nose: Nose normal.      Mouth/Throat:      Mouth: Mucous membranes are moist.      Pharynx: Oropharynx is clear.   Eyes:      Extraocular Movements: Extraocular movements intact.      Conjunctiva/sclera: Conjunctivae normal.   Cardiovascular:      Rate and Rhythm: Normal rate and regular rhythm.      Pulses: Normal pulses.      Heart sounds: Normal heart sounds.      Comments: CVC right sided chest.  Pulmonary:      Effort: Pulmonary effort is normal.      Breath sounds: Normal breath sounds.   Abdominal:      General: Bowel sounds are normal.      Palpations: Abdomen is soft.   Musculoskeletal:         General: Normal range of motion.      Cervical back: Normal range of motion and neck supple.      Left lower leg: No edema.   Skin:     General: Skin is warm and dry.   Neurological:      Mental Status: Mental status is at baseline.   Psychiatric:    "      Mood and Affect: Mood normal.         Behavior: Behavior normal.           Significant Labs: All pertinent labs within the past 24 hours have been reviewed.    Significant Imaging: I have reviewed all pertinent imaging results/findings within the past 24 hours.

## 2023-06-24 NOTE — PROGRESS NOTES
06/24/23 1320   Handoff Report   Received From Chelita Elizabeth   Given To Federica   Vital Signs   Temp 97.7 °F (36.5 °C)   Temp Source Oral   Pulse 60   Resp 16   SpO2 97 %   Pulse Oximetry Type Intermittent   Device (Oxygen Therapy) room air   /85   BP Location Right arm   BP Method Automatic   Patient Position Lying   Assessments (Pre/Post)   Blood Liters Processed (BLP) 57.6   Transport Modality bed   Level of Consciousness (AVPU) alert   Dialyzer Clearance moderately streaked        Hemodialysis Catheter 06/22/23 1809 right internal jugular   Placement Date/Time: 06/22/23 1809   Present Prior to Hospital Arrival?: No  Hand Hygiene: Performed  Barrier Precautions: Performed  Skin Antisepsis: ChloraPrep  Hemodialysis Catheter Type: Tunneled catheter  Location: right internal jugular  Cathete...   Line Necessity Review CRRT/HD   Site Assessment No drainage;No redness;No swelling;No warmth   Line Securement Device Secured with sutures   Dressing Type CHG impregnated dressing/sponge   Dressing Status Clean;Dry;Intact   Dressing Intervention Integrity maintained   Date on Dressing 06/23/23   Dressing Due to be Changed 06/29/23   Venous Patency/Care flushed w/o difficulty;heparin locked   Arterial Patency/Care flushed w/o difficulty;heparin locked   Waveform Not being transduced   Post-Hemodialysis Assessment   Rinseback Volume (mL) 250 mL   Blood Volume Processed (Liters) 57.6 L   Dialyzer Clearance Moderately streaked   Duration of Treatment 180 minutes   Additional Fluid Intake (mL) 600 mL   Total UF (mL) 1600 mL   Net Fluid Removal 1000   Patient Response to Treatment Tolerated well   Post-Treatment Weight 130.5 kg (287 lb 11.2 oz)   Treatment Weight Change -1   Post-Hemodialysis Comments Tx complete, no issues     HD tx tolerated well  Net UF 1L  Pt educated re hemodialysis tx

## 2023-06-24 NOTE — ASSESSMENT & PLAN NOTE
-Continue Lasix  -Continue metoprolol  -Continue hydralazine and nitrate  -Monitor K and Mg  -Telemetry  -HD per Nephrology

## 2023-06-24 NOTE — NURSING
Discussed/educated patient on the orders for 1500 mL fluid restriction.  Patient informed me of the oral intake during today and is reflected in I/O documented time 1900.

## 2023-06-24 NOTE — ASSESSMENT & PLAN NOTE
Body mass index is 38.52 kg/m². Morbid obesity complicates all aspects of disease management from diagnostic modalities to treatment.

## 2023-06-24 NOTE — NURSING
Pt received morning meds. blood pressure was 124/82. Pt blood pressure meds. consisted of valsartan,clonidine,metoprolol,and hydralazine.   Patient stated that the valsartan was supposed to be discontinued, dr notified and valsartan was discontinued. Metoprolol and hydralazine was held. Clonidine was given. Doctor notified.     Pt off unit to dialysis @1000.    Returned to unit @1300.      Pt returned to unit in a lot of pain, blood pressure elevated , blood pressure rechecked once patient was educated to be calm and it returned to normal.     Patient received scheduled meds. Again only clonidine was given for blood pressure meds. related to a low blood pressure, other blood pressure meds. were held.  notified, responded by telling me to continue to hold meds. Patient also received another dose of benadryl related to itching an expected side effect experienced by dialysis patients.

## 2023-06-24 NOTE — PLAN OF CARE
Problem: Adult Inpatient Plan of Care  Goal: Plan of Care Review  Outcome: Ongoing, Progressing  Goal: Optimal Comfort and Wellbeing  Outcome: Ongoing, Progressing     Problem: Device-Related Complication Risk (Hemodialysis)  Goal: Safe, Effective Therapy Delivery  Outcome: Ongoing, Progressing     Problem: Infection  Goal: Absence of Infection Signs and Symptoms  Outcome: Ongoing, Progressing     Problem: Fall Injury Risk  Goal: Absence of Fall and Fall-Related Injury  Outcome: Ongoing, Progressing  Intervention: Promote Injury-Free Environment  Flowsheets (Taken 6/24/2023 0607)  Safety Promotion/Fall Prevention:   assistive device/personal item within reach   bed alarm set   upper side rails raised x 2, lower siderails raised x 1 (Peds only)     Problem: Pain Acute  Goal: Acceptable Pain Control and Functional Ability  Outcome: Ongoing, Progressing  Intervention: Prevent or Manage Pain  Flowsheets (Taken 6/24/2023 0607)  Sleep/Rest Enhancement: awakenings minimized  Medication Review/Management: medications reviewed

## 2023-06-25 LAB
ABO + RH BLD: NORMAL
ALBUMIN SERPL BCP-MCNC: 3.5 G/DL (ref 3.5–5.2)
ANION GAP SERPL CALC-SCNC: 7 MMOL/L (ref 8–16)
BASOPHILS # BLD AUTO: 0.01 K/UL (ref 0–0.2)
BASOPHILS NFR BLD: 0.2 % (ref 0–1.9)
BLD GP AB SCN CELLS X3 SERPL QL: NORMAL
BUN SERPL-MCNC: 36 MG/DL (ref 6–20)
CALCIUM SERPL-MCNC: 9.5 MG/DL (ref 8.7–10.5)
CHLORIDE SERPL-SCNC: 103 MMOL/L (ref 95–110)
CO2 SERPL-SCNC: 27 MMOL/L (ref 23–29)
CREAT SERPL-MCNC: 8.5 MG/DL (ref 0.5–1.4)
DIFFERENTIAL METHOD: ABNORMAL
EOSINOPHIL # BLD AUTO: 0.2 K/UL (ref 0–0.5)
EOSINOPHIL NFR BLD: 3.5 % (ref 0–8)
ERYTHROCYTE [DISTWIDTH] IN BLOOD BY AUTOMATED COUNT: 13.2 % (ref 11.5–14.5)
EST. GFR  (NO RACE VARIABLE): 7.4 ML/MIN/1.73 M^2
GLUCOSE SERPL-MCNC: 97 MG/DL (ref 70–110)
HCT VFR BLD AUTO: 31.1 % (ref 40–54)
HGB BLD-MCNC: 10.1 G/DL (ref 14–18)
IMM GRANULOCYTES # BLD AUTO: 0.02 K/UL (ref 0–0.04)
IMM GRANULOCYTES NFR BLD AUTO: 0.4 % (ref 0–0.5)
LYMPHOCYTES # BLD AUTO: 1.7 K/UL (ref 1–4.8)
LYMPHOCYTES NFR BLD: 32.8 % (ref 18–48)
MAGNESIUM SERPL-MCNC: 2.1 MG/DL (ref 1.6–2.6)
MCH RBC QN AUTO: 30.5 PG (ref 27–31)
MCHC RBC AUTO-ENTMCNC: 32.5 G/DL (ref 32–36)
MCV RBC AUTO: 94 FL (ref 82–98)
MONOCYTES # BLD AUTO: 0.8 K/UL (ref 0.3–1)
MONOCYTES NFR BLD: 16.3 % (ref 4–15)
NEUTROPHILS # BLD AUTO: 2.4 K/UL (ref 1.8–7.7)
NEUTROPHILS NFR BLD: 46.8 % (ref 38–73)
NRBC BLD-RTO: 0 /100 WBC
PHOSPHATE SERPL-MCNC: 5.9 MG/DL (ref 2.7–4.5)
PLATELET # BLD AUTO: 206 K/UL (ref 150–450)
PMV BLD AUTO: 10 FL (ref 9.2–12.9)
POTASSIUM SERPL-SCNC: 4.5 MMOL/L (ref 3.5–5.1)
RBC # BLD AUTO: 3.31 M/UL (ref 4.6–6.2)
SODIUM SERPL-SCNC: 137 MMOL/L (ref 136–145)
SPECIMEN OUTDATE: NORMAL
WBC # BLD AUTO: 5.16 K/UL (ref 3.9–12.7)

## 2023-06-25 PROCEDURE — 63600175 PHARM REV CODE 636 W HCPCS: Performed by: STUDENT IN AN ORGANIZED HEALTH CARE EDUCATION/TRAINING PROGRAM

## 2023-06-25 PROCEDURE — 12000002 HC ACUTE/MED SURGE SEMI-PRIVATE ROOM

## 2023-06-25 PROCEDURE — 83735 ASSAY OF MAGNESIUM: CPT | Performed by: SURGERY

## 2023-06-25 PROCEDURE — 86900 BLOOD TYPING SEROLOGIC ABO: CPT | Performed by: STUDENT IN AN ORGANIZED HEALTH CARE EDUCATION/TRAINING PROGRAM

## 2023-06-25 PROCEDURE — 85025 COMPLETE CBC W/AUTO DIFF WBC: CPT | Performed by: SURGERY

## 2023-06-25 PROCEDURE — 80069 RENAL FUNCTION PANEL: CPT | Performed by: SURGERY

## 2023-06-25 PROCEDURE — 25000003 PHARM REV CODE 250: Performed by: INTERNAL MEDICINE

## 2023-06-25 PROCEDURE — 25000003 PHARM REV CODE 250: Performed by: SURGERY

## 2023-06-25 PROCEDURE — 25000003 PHARM REV CODE 250: Performed by: STUDENT IN AN ORGANIZED HEALTH CARE EDUCATION/TRAINING PROGRAM

## 2023-06-25 RX ORDER — OXYCODONE AND ACETAMINOPHEN 10; 325 MG/1; MG/1
1 TABLET ORAL EVERY 4 HOURS PRN
Status: DISCONTINUED | OUTPATIENT
Start: 2023-06-25 | End: 2023-06-29 | Stop reason: HOSPADM

## 2023-06-25 RX ADMIN — CALCIUM CARBONATE 1000 MG: 500 TABLET, CHEWABLE ORAL at 10:06

## 2023-06-25 RX ADMIN — HEPARIN SODIUM 5000 UNITS: 5000 INJECTION INTRAVENOUS; SUBCUTANEOUS at 05:06

## 2023-06-25 RX ADMIN — HEPARIN SODIUM 5000 UNITS: 5000 INJECTION INTRAVENOUS; SUBCUTANEOUS at 09:06

## 2023-06-25 RX ADMIN — CALCIUM CARBONATE 1000 MG: 500 TABLET, CHEWABLE ORAL at 09:06

## 2023-06-25 RX ADMIN — SEVELAMER CARBONATE 1600 MG: 800 TABLET, FILM COATED ORAL at 10:06

## 2023-06-25 RX ADMIN — DIPHENHYDRAMINE HYDROCHLORIDE 25 MG: 25 CAPSULE ORAL at 01:06

## 2023-06-25 RX ADMIN — FUROSEMIDE 80 MG: 40 TABLET ORAL at 03:06

## 2023-06-25 RX ADMIN — MUPIROCIN 1 G: 20 OINTMENT TOPICAL at 10:06

## 2023-06-25 RX ADMIN — CALCITRIOL CAPSULES 0.25 MCG 0.25 MCG: 0.25 CAPSULE ORAL at 10:06

## 2023-06-25 RX ADMIN — DIPHENHYDRAMINE HYDROCHLORIDE 25 MG: 25 CAPSULE ORAL at 10:06

## 2023-06-25 RX ADMIN — CALCIUM CARBONATE 1000 MG: 500 TABLET, CHEWABLE ORAL at 03:06

## 2023-06-25 RX ADMIN — ISOSORBIDE MONONITRATE 120 MG: 60 TABLET, EXTENDED RELEASE ORAL at 10:06

## 2023-06-25 RX ADMIN — FUROSEMIDE 80 MG: 40 TABLET ORAL at 11:06

## 2023-06-25 RX ADMIN — MORPHINE SULFATE 4 MG: 4 INJECTION, SOLUTION INTRAMUSCULAR; INTRAVENOUS at 09:06

## 2023-06-25 RX ADMIN — HYDRALAZINE HYDROCHLORIDE 100 MG: 25 TABLET ORAL at 09:06

## 2023-06-25 RX ADMIN — HEPARIN SODIUM 5000 UNITS: 5000 INJECTION INTRAVENOUS; SUBCUTANEOUS at 03:06

## 2023-06-25 RX ADMIN — SEVELAMER CARBONATE 1600 MG: 800 TABLET, FILM COATED ORAL at 03:06

## 2023-06-25 RX ADMIN — FUROSEMIDE 80 MG: 40 TABLET ORAL at 10:06

## 2023-06-25 RX ADMIN — METOPROLOL SUCCINATE 150 MG: 50 TABLET, FILM COATED, EXTENDED RELEASE ORAL at 10:06

## 2023-06-25 RX ADMIN — MORPHINE SULFATE 4 MG: 4 INJECTION, SOLUTION INTRAMUSCULAR; INTRAVENOUS at 10:06

## 2023-06-25 RX ADMIN — DIPHENHYDRAMINE HYDROCHLORIDE 25 MG: 25 CAPSULE ORAL at 07:06

## 2023-06-25 RX ADMIN — SEVELAMER CARBONATE 1600 MG: 800 TABLET, FILM COATED ORAL at 05:06

## 2023-06-25 RX ADMIN — HYDROCODONE BITARTRATE AND ACETAMINOPHEN 1 TABLET: 10; 325 TABLET ORAL at 05:06

## 2023-06-25 RX ADMIN — MUPIROCIN 1 G: 20 OINTMENT TOPICAL at 09:06

## 2023-06-25 RX ADMIN — OXYCODONE HYDROCHLORIDE AND ACETAMINOPHEN 1 TABLET: 10; 325 TABLET ORAL at 03:06

## 2023-06-25 NOTE — CONSULTS
INPATIENT NEPHROLOGY CONSULT   [facility] NEPHROLOGY    João Ham  06/25/2023    Reason for consultation:    Esrd    Chief Complaint:   Chief Complaint   Patient presents with    ABNORMAL LABS     KIDNEY FUNCTIONS ARE ABNORMAL. PT NOT SURE OF NUMBERS. SENT PER DR SALTER          History of Present Illness:    Per H and P    João Ham is a 42-year-old male with chronic medical problems including CKD stage 5, hypertension,HFpEF, anemia of chronic disease and CLOVIS who presents to the emergency room sent by Nephrology to start dialysis because of increasing BUN/CR.  Patient states that he has been asymptomatic.  He denies shortness or breath, chest pain, increase in swelling, continues to void several times daily, denies headaches or dizziness, abdominal pain or diarrhea, palpitations.  His only complaint is fatigue.     In the ED, vital signs with temperature 97.8°, heart rate 53, blood pressure 127/79, respiratory rate 16 and O2 sat 95%.  BUN/CR 81/11.9, sodium 135, potassium 4.2, serum bicarb 26, anion gap 11, BNP 77 and troponin 19.6, albumin 3.6 calcium 8.9, liver tests within normal limits.  H&H 9.8/30.2, WBC 4.03 and platelets 260.  Chest x-ray was negative for anything acute, 12 lead EKG with sinus bradycardia, ventricular rate 54 and .  He will be admitted to the hospitalist service for further evaluation and treatment with consult to Nephrology and vascular surgery.      6/23  Patient seen on hemodialysis for uremic clearance and ultrafiltration.               C./o pain at tunneled cath site.  No discharge or swelling.  Catheter functioning fine    6/24  tolerated hd yesterday.  AFVSS.   Patient seen on hemodialysis for uremic clearance and ultrafiltration.    6/25  AFVSS.  No nausea, chest pain, sob, fever, urinary or bowel complaint, new neurologic symptoms, new joint pain               Plan of Care:       Assessment:    ESRD  --dmwf  --clear for d/c once outpt hd set  up    Hyperphosphatemia  --renal diet  --continue Sevelamer with meals    Hypertension  --continue outpatient medication    Anemia  --erythropoiesis stimulating agent with renal replacement therapy      Secondary hyperparathyroidism  --low phos diet  --continue calcitriol         Thank you for allowing us to participate in this patient's care. We will continue to follow.    Vital Signs:  Temp Readings from Last 3 Encounters:   06/25/23 98.4 °F (36.9 °C) (Oral)   04/19/23 99 °F (37.2 °C) (Oral)   03/03/23 98.4 °F (36.9 °C) (Oral)       Pulse Readings from Last 3 Encounters:   06/25/23 (!) 53   05/25/23 71   04/19/23 78       BP Readings from Last 3 Encounters:   06/25/23 128/70   05/25/23 (!) 176/101   04/19/23 (!) 142/90       Weight:  Wt Readings from Last 3 Encounters:   06/21/23 (!) 136.1 kg (300 lb 0.7 oz)   05/25/23 (!) 137 kg (302 lb 0.5 oz)   04/19/23 (!) 137 kg (302 lb 0.5 oz)       Past Medical & Surgical History:  Past Medical History:   Diagnosis Date    Allergy     Anemia     Anxiety     CHF (congestive heart failure)     Depression     High blood pressure with chronic kidney disease, stage 5 chronic kidney disease or end stage renal disease     Hypertension        Past Surgical History:   Procedure Laterality Date    HERNIA REPAIR Right     With mesh    INSERTION, CATHETER, TUNNELED N/A 6/22/2023    Procedure: Insertion,catheter,tunneled;  Surgeon: Everett Caicedo MD;  [facility]: 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     Social Determinants of Health     Financial Resource Strain: High Risk    Difficulty of Paying Living Expenses: Hard   Food  Insecurity: No Food Insecurity    Worried About Running Out of Food in the Last Year: Never true    Ran Out of Food in the Last Year: Never true   Transportation Needs: No Transportation Needs    Lack of Transportation (Medical): No    Lack of Transportation (Non-Medical): No   Physical Activity: Sufficiently Active    Days of Exercise per Week: 6 days    Minutes of Exercise per Session: 60 min   Stress: Stress Concern Present    Feeling of Stress : Very much   Social Connections: Socially Isolated    Frequency of Communication with Friends and Family: More than three times a week    Frequency of Social Gatherings with Friends and Family: More than three times a week    Attends Congregation Services: Never    Active Member of Clubs or Organizations: No    Attends Club or Organization Meetings: Never    Marital Status: Never    Housing Stability: Low Risk     Unable to Pay for Housing in the Last Year: No    Number of Places Lived in the Last Year: 2    Unstable Housing in the Last Year: No       Medications:  No current facility-administered medications on file prior to encounter.     Current Outpatient Medications on File Prior to Encounter   Medication Sig Dispense Refill    amLODIPine (NORVASC) 10 MG tablet Take 1 tablet (10 mg total) by mouth once daily. 90 tablet 3    calcitRIOL (ROCALTROL) 0.25 MCG Cap Take 1 capsule by mouth once daily (Patient taking differently: Take 0.25 mcg by mouth once daily.) 90 capsule 1    calcium carbonate (TUMS) 200 mg calcium (500 mg) chewable tablet Take 2 tablets (1,000 mg total) by mouth 3 (three) times daily with meals. (Patient taking differently: Take 1,000 mg by mouth 3 (three) times daily.) 180 tablet 11    cloNIDine (CATAPRES) 0.3 MG tablet Take 1 tablet (0.3 mg total) by mouth 3 (three) times daily. 270 tablet 1    furosemide (LASIX) 80 MG tablet Take 1 tablet (80 mg total) by mouth 3 (three) times daily with meals. (Patient taking differently: Take 80 mg by mouth 3  "(three) times daily.) 90 tablet 11    hydrALAZINE (APRESOLINE) 100 MG tablet Take 1 tablet (100 mg total) by mouth 3 (three) times daily. 90 tablet 11    isosorbide mononitrate (IMDUR) 120 MG 24 hr tablet Take 1 tablet (120 mg total) by mouth once daily. 30 tablet 11    metoprolol succinate (TOPROL-XL) 50 MG 24 hr tablet Take 3 tablets (150 mg total) by mouth once daily. 90 tablet 11    olmesartan (BENICAR) 40 MG tablet Take 1 tablet (40 mg total) by mouth once daily. 90 tablet 1    sevelamer carbonate (RENVELA) 800 mg Tab Take 2 tablets (1,600 mg total) by mouth 3 (three) times daily with meals. 180 tablet 11     Scheduled Meds:   allopurinoL  50 mg Oral Every Mon, Fri    calcitRIOL  0.25 mcg Oral Daily    calcium carbonate  1,000 mg Oral TID    [START ON 6/26/2023] epoetin mily  50 Units/kg Intravenous Every Mon, Wed, Fri    furosemide  80 mg Oral TID AC    heparin (porcine)  5,000 Units Subcutaneous Q8H    hydrALAZINE  100 mg Oral TID    isosorbide mononitrate  120 mg Oral Daily    metoprolol succinate  150 mg Oral Daily    mupirocin   Nasal BID    sevelamer carbonate  1,600 mg Oral TID WM     Continuous Infusions:  PRN Meds:.acetaminophen, diphenhydrAMINE, heparin (porcine), HYDROcodone-acetaminophen, morphine, naloxone, ondansetron, prochlorperazine, sodium chloride 0.9%    Allergies:  Mushroom    Past Family History:  Reviewed; refer to Hospitalist Admission Note    Review of Systems:  Review of Systems - All 14 systems reviewed and negative, except as noted in HPI    Physical Exam:    /70   Pulse (!) 53   Temp 98.4 °F (36.9 °C) (Oral)   Resp 16   Ht 6' 2" (1.88 m)   Wt (!) 136.1 kg (300 lb 0.7 oz)   SpO2 100%   BMI 38.52 kg/m²     General Appearance:    Alert, cooperative, no distress, appears stated age   Head:    Normocephalic, without obvious abnormality, atraumatic   Eyes:    PER, conjunctiva/corneas clear, EOM's intact in both eyes        Throat:   Lips, mucosa, and tongue normal; teeth and " gums normal   Back:     Symmetric, no curvature, ROM normal, no CVA tenderness   Lungs:     Clear to auscultation bilaterally, respirations unlabored   Chest wall:    No tenderness or deformity   Heart:    Regular rate and rhythm, S1 and S2 normal, no murmur, rub   or gallop   Abdomen:     Soft, non-tender, bowel sounds active all four quadrants,     no masses, no organomegaly   Extremities:   Extremities normal, atraumatic, no cyanosis or edema   Pulses:   2+ and symmetric all extremities   MSK:   No joint or muscle swelling, tenderness or deformity   Skin:   Skin color, texture, turgor normal, no rashes or lesions   Neurologic:   CNII-XII intact, normal strength and sensation       Throughout.  No flap     Results:  Lab Results   Component Value Date     06/25/2023    K 4.5 06/25/2023     06/25/2023    CO2 27 06/25/2023    BUN 36 (H) 06/25/2023    CREATININE 8.5 (H) 06/25/2023    CALCIUM 9.5 06/25/2023    ANIONGAP 7 (L) 06/25/2023       Lab Results   Component Value Date    CALCIUM 9.5 06/25/2023    PHOS 5.9 (H) 06/25/2023       Recent Labs   Lab 06/25/23  0703   WBC 5.16   RBC 3.31*   HGB 10.1*   HCT 31.1*      MCV 94   MCH 30.5   MCHC 32.5          Imaging Results              X-Ray Chest PA And Lateral (Final result)  Result time 06/21/23 18:45:20   Procedure changed from X-Ray Chest AP Portable     Final result by Clark Santana MD (06/21/23 18:45:20)                   Narrative:    EXAM DESCRIPTION: XR CHEST PA AND LATERAL    CLINICAL HISTORY: 42 years Male, Acute kidney injury    COMPARISON: None.    FINDINGS: 2 views of the chest were obtained. The heart is at the upper limits of normal in size. The pulmonary vasculature is normal. No consolidations are seen, nor is there evidence of pleural fluid. No osseous lesions are seen.    IMPRESSION:  No acute cardiopulmonary disease is seen.    Electronically signed by:  Clark Santana MD  6/21/2023 6:45 PM CDT Workstation: [id number]                                       I have personally reviewed pertinent radiological imaging and reports.    Patient care was time spent personally by me on the following activities:   Obtaining a history  Examination of patient.  Providing medical care at the patients bedside.  Developing a treatment plan with patient or surrogate and bedside caregivers  Ordering and reviewing laboratory studies, radiographic studies, pulse oximetry.  Ordering and performing treatments and interventions.  Evaluation of patient's response to treatment.  Discussions with consultants while on the unit and immediately available to the patient.  Re-evaluation of the patient's condition.  Documentation in the medical record.     Mariano Hickey MD  Nephrology  [facility] [place]  [phone]

## 2023-06-25 NOTE — PROGRESS NOTES
[facility]  Progress Note    Patient Name: João Ham  MRN: [id number]  Patient Class: IP- Inpatient   Admission Date: 6/21/2023  Length of Stay: 3 days  Attending Physician: Jono Knowles MD  Primary Care Provider: Dawson Granado MD        Subjective:     Principal Problem:ESRD (end stage renal disease)        HPI:  João Ham is a 42-year-old male with chronic medical problems including CKD stage 5, hypertension,HFpEF, anemia of chronic disease and CLOVIS who presents to the emergency room sent by Nephrology to start dialysis because of increasing BUN/CR.  Patient states that he has been asymptomatic.  He denies shortness or breath, chest pain, increase in swelling, continues to void several times daily, denies headaches or dizziness, abdominal pain or diarrhea, palpitations.  His only complaint is fatigue.    In the ED, vital signs with temperature 97.8°, heart rate 53, blood pressure 127/79, respiratory rate 16 and O2 sat 95%.  BUN/CR 81/11.9, sodium 135, potassium 4.2, serum bicarb 26, anion gap 11, BNP 77 and troponin 19.6, albumin 3.6 calcium 8.9, liver tests within normal limits.  H&H 9.8/30.2, WBC 4.03 and platelets 260.  Chest x-ray was negative for anything acute, 12 lead EKG with sinus bradycardia, ventricular rate 54 and .  He will be admitted to the hospitalist service for further evaluation and treatment with consult to Nephrology and vascular surgery.        Overview/Hospital Course:  João Ham is a 42 year old male with a past medical history of CKD V, HTN, HFpEF, obesity, anemia, and CLOVIS who presented with newly diagnosed ESRD necessitating HD initiation. A tunneled HD catheter was placed by Vascular Surgery. Nephrology has been consulted and is managing the HD. The patient has been approved for an HD chair starting 6/28. Discharge is tentatively planned for 6/26 after HD. Home BP medications are currently being adjusted; home valsartan, amlodipine  "and clonidine have been held.      Interval History: see "Hospital Course"    Review of Systems   Genitourinary:  Positive for decreased urine volume.   Musculoskeletal:  Positive for arthralgias and myalgias.   Allergic/Immunologic: Positive for immunocompromised state.   Objective:     Vital Signs (Most Recent):  Temp: 98.4 °F (36.9 °C) (06/25/23 0735)  Pulse: (!) 53 (06/25/23 0735)  Resp: 18 (06/25/23 0735)  BP: 126/76 (06/25/23 0735)  SpO2: 100 % (06/25/23 0735) Vital Signs (24h Range):  Temp:  [97.7 °F (36.5 °C)-98.4 °F (36.9 °C)] 98.4 °F (36.9 °C)  Pulse:  [53-72] 53  Resp:  [16-20] 18  SpO2:  [94 %-100 %] 100 %  BP: (110-146)/() 126/76     Weight: (!) 136.1 kg (300 lb 0.7 oz)  Body mass index is 38.52 kg/m².    Intake/Output Summary (Last 24 hours) at 6/25/2023 1034  Last data filed at 6/25/2023 1020  Gross per 24 hour   Intake 1410 ml   Output 2300 ml   Net -890 ml         Physical Exam  Vitals and nursing note reviewed.   Constitutional:       Appearance: He is obese.   HENT:      Head: Normocephalic and atraumatic.      Right Ear: External ear normal.      Left Ear: External ear normal.      Nose: Nose normal.      Mouth/Throat:      Mouth: Mucous membranes are moist.      Pharynx: Oropharynx is clear.   Eyes:      Extraocular Movements: Extraocular movements intact.      Conjunctiva/sclera: Conjunctivae normal.   Cardiovascular:      Rate and Rhythm: Normal rate and regular rhythm.      Pulses: Normal pulses.      Heart sounds: Normal heart sounds.      Comments: CVC right sided chest.  Pulmonary:      Effort: Pulmonary effort is normal.      Breath sounds: Normal breath sounds.   Abdominal:      General: Bowel sounds are normal.      Palpations: Abdomen is soft.   Musculoskeletal:         General: Normal range of motion.      Cervical back: Normal range of motion and neck supple.      Left lower leg: No edema.   Skin:     General: Skin is warm and dry.   Neurological:      Mental Status: Mental " status is at baseline.   Psychiatric:         Mood and Affect: Mood normal.         Behavior: Behavior normal.           Significant Labs: All pertinent labs within the past 24 hours have been reviewed.    Significant Imaging: I have reviewed all pertinent imaging results/findings within the past 24 hours.      Assessment/Plan:      * ESRD (end stage renal disease)  -HD chair approved  -Nephrology consulted  -Renally dose medications  -Avoid nephrotoxic agents  -Continue sevelamer      Resistant hypertension  -Clonidine and amlodipine held  -Hydralazine  -Nitrate  -Metoprolol  -Continue to monitor    Chronic heart failure with preserved ejection fraction  -Continue Lasix  -Continue metoprolol  -Continue hydralazine and nitrate  -Monitor K and Mg  -Telemetry  -HD per Nephrology    Sleep apnea  Patient refusing CPAP.      Hyperparathyroidism  -Continue calcium and calcitriol      Severe obesity with body mass index (BMI) of 35.0 to 39.9 with comorbidity  Body mass index is 38.52 kg/m². Morbid obesity complicates all aspects of disease management from diagnostic modalities to treatment.         Anemia of chronic disease  Stable.  -Trend Hgb with CBC      VTE Risk Mitigation (From admission, onward)         Ordered     heparin (porcine) injection 5,000 Units  Every 8 hours         06/24/23 0940     heparin (porcine) injection 4,000 Units  As needed (PRN)         06/23/23 0947     IP VTE HIGH RISK PATIENT  Once         06/21/23 2038     Place sequential compression device  Until discontinued         06/21/23 2038                Discharge Planning   MARIA ISABEL: 6/25/2023     Code Status: Full Code   Is the patient medically ready for discharge?:     Reason for patient still in hospital (select all that apply): Pending disposition  Discharge Plan A: Home with family                  Jono Knowles MD  Department of Hospital Medicine   [facility]

## 2023-06-25 NOTE — SUBJECTIVE & OBJECTIVE
"Interval History: see "Hospital Course"    Review of Systems   Genitourinary:  Positive for decreased urine volume.   Musculoskeletal:  Positive for arthralgias and myalgias.   Allergic/Immunologic: Positive for immunocompromised state.   Objective:     Vital Signs (Most Recent):  Temp: 98.4 °F (36.9 °C) (06/25/23 0735)  Pulse: (!) 53 (06/25/23 0735)  Resp: 18 (06/25/23 0735)  BP: 126/76 (06/25/23 0735)  SpO2: 100 % (06/25/23 0735) Vital Signs (24h Range):  Temp:  [97.7 °F (36.5 °C)-98.4 °F (36.9 °C)] 98.4 °F (36.9 °C)  Pulse:  [53-72] 53  Resp:  [16-20] 18  SpO2:  [94 %-100 %] 100 %  BP: (110-146)/() 126/76     Weight: (!) 136.1 kg (300 lb 0.7 oz)  Body mass index is 38.52 kg/m².    Intake/Output Summary (Last 24 hours) at 6/25/2023 1034  Last data filed at 6/25/2023 1020  Gross per 24 hour   Intake 1410 ml   Output 2300 ml   Net -890 ml         Physical Exam  Vitals and nursing note reviewed.   Constitutional:       Appearance: He is obese.   HENT:      Head: Normocephalic and atraumatic.      Right Ear: External ear normal.      Left Ear: External ear normal.      Nose: Nose normal.      Mouth/Throat:      Mouth: Mucous membranes are moist.      Pharynx: Oropharynx is clear.   Eyes:      Extraocular Movements: Extraocular movements intact.      Conjunctiva/sclera: Conjunctivae normal.   Cardiovascular:      Rate and Rhythm: Normal rate and regular rhythm.      Pulses: Normal pulses.      Heart sounds: Normal heart sounds.      Comments: CVC right sided chest.  Pulmonary:      Effort: Pulmonary effort is normal.      Breath sounds: Normal breath sounds.   Abdominal:      General: Bowel sounds are normal.      Palpations: Abdomen is soft.   Musculoskeletal:         General: Normal range of motion.      Cervical back: Normal range of motion and neck supple.      Left lower leg: No edema.   Skin:     General: Skin is warm and dry.   Neurological:      Mental Status: Mental status is at baseline.   Psychiatric:  "        Mood and Affect: Mood normal.         Behavior: Behavior normal.           Significant Labs: All pertinent labs within the past 24 hours have been reviewed.    Significant Imaging: I have reviewed all pertinent imaging results/findings within the past 24 hours.

## 2023-06-26 LAB
ALBUMIN SERPL BCP-MCNC: 3.8 G/DL (ref 3.5–5.2)
ANION GAP SERPL CALC-SCNC: 11 MMOL/L (ref 8–16)
BASOPHILS # BLD AUTO: 0.02 K/UL (ref 0–0.2)
BASOPHILS NFR BLD: 0.4 % (ref 0–1.9)
BUN SERPL-MCNC: 42 MG/DL (ref 6–20)
CALCIUM SERPL-MCNC: 9.3 MG/DL (ref 8.7–10.5)
CHLORIDE SERPL-SCNC: 98 MMOL/L (ref 95–110)
CO2 SERPL-SCNC: 26 MMOL/L (ref 23–29)
CREAT SERPL-MCNC: 9.4 MG/DL (ref 0.5–1.4)
DIFFERENTIAL METHOD: ABNORMAL
EOSINOPHIL # BLD AUTO: 0.2 K/UL (ref 0–0.5)
EOSINOPHIL NFR BLD: 4.2 % (ref 0–8)
ERYTHROCYTE [DISTWIDTH] IN BLOOD BY AUTOMATED COUNT: 12.9 % (ref 11.5–14.5)
EST. GFR  (NO RACE VARIABLE): 6.6 ML/MIN/1.73 M^2
GLUCOSE SERPL-MCNC: 102 MG/DL (ref 70–110)
HCT VFR BLD AUTO: 33.2 % (ref 40–54)
HGB BLD-MCNC: 10.6 G/DL (ref 14–18)
IMM GRANULOCYTES # BLD AUTO: 0.01 K/UL (ref 0–0.04)
IMM GRANULOCYTES NFR BLD AUTO: 0.2 % (ref 0–0.5)
LYMPHOCYTES # BLD AUTO: 1.5 K/UL (ref 1–4.8)
LYMPHOCYTES NFR BLD: 26.6 % (ref 18–48)
MAGNESIUM SERPL-MCNC: 2 MG/DL (ref 1.6–2.6)
MCH RBC QN AUTO: 29.7 PG (ref 27–31)
MCHC RBC AUTO-ENTMCNC: 31.9 G/DL (ref 32–36)
MCV RBC AUTO: 93 FL (ref 82–98)
MONOCYTES # BLD AUTO: 0.8 K/UL (ref 0.3–1)
MONOCYTES NFR BLD: 15 % (ref 4–15)
NEUTROPHILS # BLD AUTO: 2.9 K/UL (ref 1.8–7.7)
NEUTROPHILS NFR BLD: 53.6 % (ref 38–73)
NRBC BLD-RTO: 0 /100 WBC
PHOSPHATE SERPL-MCNC: 6.1 MG/DL (ref 2.7–4.5)
PLATELET # BLD AUTO: 221 K/UL (ref 150–450)
PMV BLD AUTO: 10 FL (ref 9.2–12.9)
POTASSIUM SERPL-SCNC: 3.8 MMOL/L (ref 3.5–5.1)
RBC # BLD AUTO: 3.57 M/UL (ref 4.6–6.2)
SODIUM SERPL-SCNC: 135 MMOL/L (ref 136–145)
WBC # BLD AUTO: 5.48 K/UL (ref 3.9–12.7)

## 2023-06-26 PROCEDURE — 25000003 PHARM REV CODE 250: Performed by: INTERNAL MEDICINE

## 2023-06-26 PROCEDURE — 85025 COMPLETE CBC W/AUTO DIFF WBC: CPT | Performed by: SURGERY

## 2023-06-26 PROCEDURE — 63600175 PHARM REV CODE 636 W HCPCS: Performed by: INTERNAL MEDICINE

## 2023-06-26 PROCEDURE — 90935 HEMODIALYSIS ONE EVALUATION: CPT

## 2023-06-26 PROCEDURE — 63600175 PHARM REV CODE 636 W HCPCS: Performed by: STUDENT IN AN ORGANIZED HEALTH CARE EDUCATION/TRAINING PROGRAM

## 2023-06-26 PROCEDURE — 36415 COLL VENOUS BLD VENIPUNCTURE: CPT | Performed by: SURGERY

## 2023-06-26 PROCEDURE — 25000003 PHARM REV CODE 250: Performed by: HOSPITALIST

## 2023-06-26 PROCEDURE — 63600175 PHARM REV CODE 636 W HCPCS: Performed by: SURGERY

## 2023-06-26 PROCEDURE — 25000003 PHARM REV CODE 250: Performed by: SURGERY

## 2023-06-26 PROCEDURE — 12000002 HC ACUTE/MED SURGE SEMI-PRIVATE ROOM

## 2023-06-26 PROCEDURE — 83735 ASSAY OF MAGNESIUM: CPT | Performed by: SURGERY

## 2023-06-26 PROCEDURE — 80069 RENAL FUNCTION PANEL: CPT | Performed by: SURGERY

## 2023-06-26 PROCEDURE — 25000003 PHARM REV CODE 250: Performed by: STUDENT IN AN ORGANIZED HEALTH CARE EDUCATION/TRAINING PROGRAM

## 2023-06-26 PROCEDURE — 63600175 PHARM REV CODE 636 W HCPCS: Mod: JZ | Performed by: INTERNAL MEDICINE

## 2023-06-26 RX ORDER — HYDRALAZINE HYDROCHLORIDE 20 MG/ML
20 INJECTION INTRAMUSCULAR; INTRAVENOUS ONCE
Status: COMPLETED | OUTPATIENT
Start: 2023-06-26 | End: 2023-06-26

## 2023-06-26 RX ORDER — AMLODIPINE BESYLATE 5 MG/1
10 TABLET ORAL DAILY
Status: DISCONTINUED | OUTPATIENT
Start: 2023-06-27 | End: 2023-06-29 | Stop reason: HOSPADM

## 2023-06-26 RX ORDER — AMLODIPINE BESYLATE 5 MG/1
5 TABLET ORAL DAILY
Status: DISCONTINUED | OUTPATIENT
Start: 2023-06-26 | End: 2023-06-26

## 2023-06-26 RX ORDER — CLONIDINE HYDROCHLORIDE 0.1 MG/1
0.1 TABLET ORAL 2 TIMES DAILY
Status: DISCONTINUED | OUTPATIENT
Start: 2023-06-26 | End: 2023-06-26

## 2023-06-26 RX ORDER — CLONIDINE HYDROCHLORIDE 0.1 MG/1
0.2 TABLET ORAL 3 TIMES DAILY
Status: DISCONTINUED | OUTPATIENT
Start: 2023-06-26 | End: 2023-06-27

## 2023-06-26 RX ADMIN — MORPHINE SULFATE 4 MG: 4 INJECTION, SOLUTION INTRAMUSCULAR; INTRAVENOUS at 06:06

## 2023-06-26 RX ADMIN — CALCIUM CARBONATE 1000 MG: 500 TABLET, CHEWABLE ORAL at 01:06

## 2023-06-26 RX ADMIN — CALCIUM CARBONATE 1000 MG: 500 TABLET, CHEWABLE ORAL at 08:06

## 2023-06-26 RX ADMIN — ISOSORBIDE MONONITRATE 120 MG: 60 TABLET, EXTENDED RELEASE ORAL at 01:06

## 2023-06-26 RX ADMIN — CLONIDINE HYDROCHLORIDE 0.1 MG: 0.1 TABLET ORAL at 06:06

## 2023-06-26 RX ADMIN — HEPARIN SODIUM 5000 UNITS: 5000 INJECTION INTRAVENOUS; SUBCUTANEOUS at 08:06

## 2023-06-26 RX ADMIN — HEPARIN SODIUM 5000 UNITS: 5000 INJECTION INTRAVENOUS; SUBCUTANEOUS at 06:06

## 2023-06-26 RX ADMIN — DIPHENHYDRAMINE HYDROCHLORIDE 25 MG: 25 CAPSULE ORAL at 02:06

## 2023-06-26 RX ADMIN — ONDANSETRON 4 MG: 2 INJECTION INTRAMUSCULAR; INTRAVENOUS at 02:06

## 2023-06-26 RX ADMIN — ALLOPURINOL 50 MG: 300 TABLET ORAL at 04:06

## 2023-06-26 RX ADMIN — CLONIDINE HYDROCHLORIDE 0.2 MG: 0.1 TABLET ORAL at 09:06

## 2023-06-26 RX ADMIN — CALCIUM CARBONATE 1000 MG: 500 TABLET, CHEWABLE ORAL at 04:06

## 2023-06-26 RX ADMIN — MORPHINE SULFATE 4 MG: 4 INJECTION, SOLUTION INTRAMUSCULAR; INTRAVENOUS at 08:06

## 2023-06-26 RX ADMIN — FUROSEMIDE 80 MG: 40 TABLET ORAL at 01:06

## 2023-06-26 RX ADMIN — DIPHENHYDRAMINE HYDROCHLORIDE 25 MG: 25 CAPSULE ORAL at 03:06

## 2023-06-26 RX ADMIN — HEPARIN SODIUM 4000 UNITS: 1000 INJECTION, SOLUTION INTRAVENOUS; SUBCUTANEOUS at 11:06

## 2023-06-26 RX ADMIN — MUPIROCIN 1 G: 20 OINTMENT TOPICAL at 08:06

## 2023-06-26 RX ADMIN — CALCITRIOL CAPSULES 0.25 MCG 0.25 MCG: 0.25 CAPSULE ORAL at 01:06

## 2023-06-26 RX ADMIN — FUROSEMIDE 80 MG: 40 TABLET ORAL at 04:06

## 2023-06-26 RX ADMIN — HEPARIN SODIUM 5000 UNITS: 5000 INJECTION INTRAVENOUS; SUBCUTANEOUS at 01:06

## 2023-06-26 RX ADMIN — HYDRALAZINE HYDROCHLORIDE 20 MG: 20 INJECTION INTRAMUSCULAR; INTRAVENOUS at 04:06

## 2023-06-26 RX ADMIN — SEVELAMER CARBONATE 1600 MG: 800 TABLET, FILM COATED ORAL at 01:06

## 2023-06-26 RX ADMIN — OXYCODONE HYDROCHLORIDE AND ACETAMINOPHEN 1 TABLET: 10; 325 TABLET ORAL at 01:06

## 2023-06-26 RX ADMIN — SEVELAMER CARBONATE 1600 MG: 800 TABLET, FILM COATED ORAL at 04:06

## 2023-06-26 RX ADMIN — AMLODIPINE BESYLATE 5 MG: 5 TABLET ORAL at 01:06

## 2023-06-26 RX ADMIN — METOPROLOL SUCCINATE 150 MG: 50 TABLET, FILM COATED, EXTENDED RELEASE ORAL at 01:06

## 2023-06-26 RX ADMIN — HYDRALAZINE HYDROCHLORIDE 100 MG: 25 TABLET ORAL at 01:06

## 2023-06-26 RX ADMIN — ERYTHROPOIETIN 6800 UNITS: 10000 INJECTION, SOLUTION INTRAVENOUS; SUBCUTANEOUS at 11:06

## 2023-06-26 RX ADMIN — HYDRALAZINE HYDROCHLORIDE 100 MG: 25 TABLET ORAL at 08:06

## 2023-06-26 RX ADMIN — FUROSEMIDE 80 MG: 40 TABLET ORAL at 06:06

## 2023-06-26 RX ADMIN — HYDRALAZINE HYDROCHLORIDE 100 MG: 25 TABLET ORAL at 04:06

## 2023-06-26 NOTE — PROGRESS NOTES
INPATIENT NEPHROLOGY PROGRESS  [facility] NEPHROLOGY    João Ham  06/26/2023    Reason for consultation:    Esrd    Chief Complaint:   Chief Complaint   Patient presents with    ABNORMAL LABS     KIDNEY FUNCTIONS ARE ABNORMAL. PT NOT SURE OF NUMBERS. SENT PER DR SALTER          History of Present Illness:    Per H and P    João Ham is a 42-year-old male with chronic medical problems including CKD stage 5, hypertension,HFpEF, anemia of chronic disease and CLOVIS who presents to the emergency room sent by Nephrology to start dialysis because of increasing BUN/CR.  Patient states that he has been asymptomatic.  He denies shortness or breath, chest pain, increase in swelling, continues to void several times daily, denies headaches or dizziness, abdominal pain or diarrhea, palpitations.  His only complaint is fatigue.     In the ED, vital signs with temperature 97.8°, heart rate 53, blood pressure 127/79, respiratory rate 16 and O2 sat 95%.  BUN/CR 81/11.9, sodium 135, potassium 4.2, serum bicarb 26, anion gap 11, BNP 77 and troponin 19.6, albumin 3.6 calcium 8.9, liver tests within normal limits.  H&H 9.8/30.2, WBC 4.03 and platelets 260.  Chest x-ray was negative for anything acute, 12 lead EKG with sinus bradycardia, ventricular rate 54 and .  He will be admitted to the hospitalist service for further evaluation and treatment with consult to Nephrology and vascular surgery.      6/23  Patient seen on hemodialysis for uremic clearance and ultrafiltration.               C./o pain at tunneled cath site.  No discharge or swelling.  Catheter functioning fine    6/24  tolerated hd yesterday.  AFVSS.   Patient seen on hemodialysis for uremic clearance and ultrafiltration.    6/25  AFVSS.  No nausea, chest pain, sob, fever, urinary or bowel complaint, new neurologic symptoms, new joint pain  6/26  lab results reviewed, VSS.  Seen on dialysis.  Pressures have been high today, will improve w/fluid removal.         Plan of Care:       Assessment:    ESRD  --dmwf  --clear for d/c once outpt hd set up    Hyperphosphatemia  --renal diet  --continue Sevelamer with meals    Hypertension  --continue outpatient medication    Anemia  --erythropoiesis stimulating agent with renal replacement therapy      Secondary hyperparathyroidism  --low phos diet  --continue calcitriol         Thank you for allowing us to participate in this patient's care. We will continue to follow.    Vital Signs:  Temp Readings from Last 3 Encounters:   06/26/23 98.1 °F (36.7 °C) (Oral)   04/19/23 99 °F (37.2 °C) (Oral)   03/03/23 98.4 °F (36.9 °C) (Oral)       Pulse Readings from Last 3 Encounters:   06/26/23 68   05/25/23 71   04/19/23 78       BP Readings from Last 3 Encounters:   06/26/23 (!) 172/116   05/25/23 (!) 176/101   04/19/23 (!) 142/90       Weight:  Wt Readings from Last 3 Encounters:   06/21/23 (!) 136.1 kg (300 lb 0.7 oz)   05/25/23 (!) 137 kg (302 lb 0.5 oz)   04/19/23 (!) 137 kg (302 lb 0.5 oz)       Past Medical & Surgical History:  Past Medical History:   Diagnosis Date    Allergy     Anemia     Anxiety     CHF (congestive heart failure)     Depression     High blood pressure with chronic kidney disease, stage 5 chronic kidney disease or end stage renal disease     Hypertension        Past Surgical History:   Procedure Laterality Date    HERNIA REPAIR Right     With mesh    INSERTION, CATHETER, TUNNELED N/A 6/22/2023    Procedure: Insertion,catheter,tunneled;  Surgeon: Everett Caicedo MD;  Location: [facility];  Service: General;  Laterality: N/A;       Past Social History:  Social History     Socioeconomic History    Marital status: Single   Tobacco Use    Smoking status: Never     Passive exposure: Never    Smokeless tobacco: Never   Substance and Sexual Activity    Alcohol use: Never    Drug use: Never    Sexual activity: Not Currently   Social History Narrative    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     Social Determinants of Health      Financial Resource Strain: High Risk    Difficulty of Paying Living Expenses: Hard   Food Insecurity: No Food Insecurity    Worried About Running Out of Food in the Last Year: Never true    Ran Out of Food in the Last Year: Never true   Transportation Needs: No Transportation Needs    Lack of Transportation (Medical): No    Lack of Transportation (Non-Medical): No   Physical Activity: Sufficiently Active    Days of Exercise per Week: 6 days    Minutes of Exercise per Session: 60 min   Stress: Stress Concern Present    Feeling of Stress : Very much   Social Connections: Socially Isolated    Frequency of Communication with Friends and Family: More than three times a week    Frequency of Social Gatherings with Friends and Family: More than three times a week    Attends Jehovah's witness Services: Never    Active Member of Clubs or Organizations: No    Attends Club or Organization Meetings: Never    Marital Status: Never    Housing Stability: Low Risk     Unable to Pay for Housing in the Last Year: No    Number of Places Lived in the Last Year: 2    Unstable Housing in the Last Year: No       Medications:  No current facility-administered medications on file prior to encounter.     Current Outpatient Medications on File Prior to Encounter   Medication Sig Dispense Refill    amLODIPine (NORVASC) 10 MG tablet Take 1 tablet (10 mg total) by mouth once daily. 90 tablet 3    calcitRIOL (ROCALTROL) 0.25 MCG Cap Take 1 capsule by mouth once daily (Patient taking differently: Take 0.25 mcg by mouth once daily.) 90 capsule 1    calcium carbonate (TUMS) 200 mg calcium (500 mg) chewable tablet Take 2 tablets (1,000 mg total) by mouth 3 (three) times daily with meals. (Patient taking differently: Take 1,000 mg by mouth 3 (three) times daily.) 180 tablet 11    cloNIDine (CATAPRES) 0.3 MG tablet Take 1 tablet (0.3 mg total) by mouth 3 (three) times daily. 270 tablet 1    furosemide (LASIX) 80 MG tablet Take 1 tablet (80 mg total)  "by mouth 3 (three) times daily with meals. (Patient taking differently: Take 80 mg by mouth 3 (three) times daily.) 90 tablet 11    hydrALAZINE (APRESOLINE) 100 MG tablet Take 1 tablet (100 mg total) by mouth 3 (three) times daily. 90 tablet 11    isosorbide mononitrate (IMDUR) 120 MG 24 hr tablet Take 1 tablet (120 mg total) by mouth once daily. 30 tablet 11    metoprolol succinate (TOPROL-XL) 50 MG 24 hr tablet Take 3 tablets (150 mg total) by mouth once daily. 90 tablet 11    olmesartan (BENICAR) 40 MG tablet Take 1 tablet (40 mg total) by mouth once daily. 90 tablet 1    sevelamer carbonate (RENVELA) 800 mg Tab Take 2 tablets (1,600 mg total) by mouth 3 (three) times daily with meals. 180 tablet 11     Scheduled Meds:   allopurinoL  50 mg Oral Every Mon, Fri    amLODIPine  5 mg Oral Daily    calcitRIOL  0.25 mcg Oral Daily    calcium carbonate  1,000 mg Oral TID    epoetin mily  50 Units/kg Intravenous Every Mon, Wed, Fri    furosemide  80 mg Oral TID AC    heparin (porcine)  5,000 Units Subcutaneous Q8H    hydrALAZINE  100 mg Oral TID    isosorbide mononitrate  120 mg Oral Daily    metoprolol succinate  150 mg Oral Daily    mupirocin   Nasal BID    sevelamer carbonate  1,600 mg Oral TID WM     Continuous Infusions:  PRN Meds:.acetaminophen, diphenhydrAMINE, heparin (porcine), morphine, naloxone, ondansetron, oxyCODONE-acetaminophen, prochlorperazine, sodium chloride 0.9%    Allergies:  Mushroom    Past Family History:  Reviewed; refer to Hospitalist Admission Note    Review of Systems:  Review of Systems - All 14 systems reviewed and negative, except as noted in HPI    Physical Exam:    BP (!) 172/116 Comment: notified nurse ashutosh  Pulse 68   Temp 98.1 °F (36.7 °C) (Oral)   Resp 18   Ht 6' 2" (1.88 m)   Wt (!) 136.1 kg (300 lb 0.7 oz)   SpO2 100%   BMI 38.52 kg/m²     General Appearance:    Alert, cooperative, no distress, appears stated age   Head:    Normocephalic, without obvious abnormality, " atraumatic   Eyes:    PER, conjunctiva/corneas clear, EOM's intact in both eyes        Throat:   Lips, mucosa, and tongue normal; teeth and gums normal   Back:     Symmetric, no curvature, ROM normal, no CVA tenderness   Lungs:     Clear to auscultation bilaterally, respirations unlabored   Chest wall:    No tenderness or deformity   Heart:    Regular rate and rhythm, S1 and S2 normal, no murmur, rub   or gallop   Abdomen:     Soft, non-tender, bowel sounds active all four quadrants,     no masses, no organomegaly   Extremities:   Extremities normal, atraumatic, no cyanosis or edema   Pulses:   2+ and symmetric all extremities   MSK:   No joint or muscle swelling, tenderness or deformity   Skin:   Skin color, texture, turgor normal, no rashes or lesions   Neurologic:   CNII-XII intact, normal strength and sensation       Throughout.  No flap     Results:  Lab Results   Component Value Date     (L) 06/26/2023    K 3.8 06/26/2023    CL 98 06/26/2023    CO2 26 06/26/2023    BUN 42 (H) 06/26/2023    CREATININE 9.4 (H) 06/26/2023    CALCIUM 9.3 06/26/2023    ANIONGAP 11 06/26/2023       Lab Results   Component Value Date    CALCIUM 9.3 06/26/2023    PHOS 6.1 (H) 06/26/2023       Recent Labs   Lab 06/26/23  0527   WBC 5.48   RBC 3.57*   HGB 10.6*   HCT 33.2*      MCV 93   MCH 29.7   MCHC 31.9*          Imaging Results              X-Ray Chest PA And Lateral (Final result)  Result time 06/21/23 18:45:20   Procedure changed from X-Ray Chest AP Portable     Final result by Clark Santana MD (06/21/23 18:45:20)                   Narrative:    EXAM DESCRIPTION: XR CHEST PA AND LATERAL    CLINICAL HISTORY: 42 years Male, Acute kidney injury    COMPARISON: None.    FINDINGS: 2 views of the chest were obtained. The heart is at the upper limits of normal in size. The pulmonary vasculature is normal. No consolidations are seen, nor is there evidence of pleural fluid. No osseous lesions are seen.    IMPRESSION:  No  acute cardiopulmonary disease is seen.    Electronically signed by:  Clark Santana MD  6/21/2023 6:45 PM CDT Workstation: [id number]                                      I have personally reviewed pertinent radiological imaging and reports.    Patient care was time spent personally by me on the following activities:   Obtaining a history  Examination of patient.  Providing medical care at the patients bedside.  Developing a treatment plan with patient or surrogate and bedside caregivers  Ordering and reviewing laboratory studies, radiographic studies, pulse oximetry.  Ordering and performing treatments and interventions.  Evaluation of patient's response to treatment.  Discussions with consultants while on the unit and immediately available to the patient.  Re-evaluation of the patient's condition.  Documentation in the medical record.     Kellie Doty NP    Nephrology  [facility] [place]  [phone]

## 2023-06-26 NOTE — PROGRESS NOTES
"[facility] Medicine Progress Note  Patient Name: João Ham MRN: [id number]   Patient Class: IP- Inpatient  Length of Stay: 4   Admission Date: 6/21/2023  5:51 PM Attending Physician: Jaguar Roe MD   Primary Care Provider: Dawson Granado MD Face-to-Face encounter date: 06/26/2023   Chief Complaint: ABNORMAL LABS (KIDNEY FUNCTIONS ARE ABNORMAL. PT NOT SURE OF NUMBERS. SENT PER DR SALTER)      Subjective:    Interval History   Patient is doing well.    Dialysis is set up as outpatient  I have seen him during dialysis   Remains very hypertensive  Denies chest pain, shortness of breath, palpitations, abdominal pain, nausea/vomiting.   No concerns/issues overnight reported by the patient or the nursing staff.  Reviewed the labs and discussed the plan of care.   No family present at bedside.     Review of Systems   All other Review of Systems were found to be negative expect for that mentioned already in HPI.     Hospital Course     06/26/2023     allopurinoL  50 mg Oral Every Mon, Fri    [START ON 6/27/2023] amLODIPine  10 mg Oral Daily    calcitRIOL  0.25 mcg Oral Daily    calcium carbonate  1,000 mg Oral TID    cloNIDine  0.1 mg Oral BID    epoetin mily  50 Units/kg Intravenous Every Mon, Wed, Fri    furosemide  80 mg Oral TID AC    heparin (porcine)  5,000 Units Subcutaneous Q8H    hydrALAZINE  100 mg Oral TID    isosorbide mononitrate  120 mg Oral Daily    metoprolol succinate  150 mg Oral Daily    mupirocin   Nasal BID    sevelamer carbonate  1,600 mg Oral TID WM       acetaminophen, diphenhydrAMINE, heparin (porcine), morphine, naloxone, ondansetron, oxyCODONE-acetaminophen, prochlorperazine, sodium chloride 0.9%      Objective:   Physical Exam  BP (!) 186/113 Comment: notified nurse ashutosh  Pulse 82   Temp 98 °F (36.7 °C) (Oral)   Resp 20   Ht 6' 2" (1.88 m)   Wt (!) 136.1 kg (300 lb 0.7 oz)   SpO2 98%   BMI 38.52 kg/m²   Constitutional: No distress.   HENT: Atraumatic. "   Cardiovascular: Normal rate, regular rhythm and normal heart sounds.   Pulmonary/Chest: Effort normal. Clear to auscultation bilaterally. No wheezes.   Abdominal: Soft. Bowel sounds are normal. Exhibits no distension and no mass. No tenderness  Neurological: Alert.   Skin: Skin is warm and dry.     Labs and Imaging    Significant Labs: All pertinent labs within the past 24 hours have been reviewed.    Significant Imaging: I have reviewed all pertinent imaging results/findings within the past 24 hours.    I have reviewed the Vitals, labs and imaging as above.     Assessment & Plan:   João Ham is a 42 y.o. male admitted for    Active Hospital Problems    Diagnosis    *ESRD (end stage renal disease)    Chronic heart failure with preserved ejection fraction    Resistant hypertension    Sleep apnea    Hyperparathyroidism    Severe obesity with body mass index (BMI) of 35.0 to 39.9 with comorbidity    Anemia of chronic disease       Plan  HD setup as outpatient  Blood pressures very high and unsafe for discharge  Started him on Norvasc and clonidine 0.1mg BID with hydralazine as needed  Anticipate discharge home tomorrow if blood pressures improved  Active PT: No  Active OT: No  Active SLP: No    Core measures:  - Code status:   - Diet: Cardiac  VTE Risk Mitigation (From admission, onward)           Ordered     heparin (porcine) injection 5,000 Units  Every 8 hours         06/24/23 0940     heparin (porcine) injection 4,000 Units  As needed (PRN)         06/23/23 0947     IP VTE HIGH RISK PATIENT  Once         06/21/23 2038     Place sequential compression device  Until discontinued         06/21/23 2038                    Discharge Planning:   Discharge Planning   MARIA ISABEL: 06/27    Code Status: Full Code   Is the patient medically ready for discharge?: no    Reason for patient still in hospital (select all that apply): Patient trending condition  Discharge Plan A: Home with assistance from family        Above encounter  included review of the medical records, interviewing and examining the patient face-to-face, discussion with family and other health care providers, ordering and interpreting lab/test results and formulating a plan of care.     Medical Decision Making:  [] Low Complexity  [] Moderate Complexity  [x] High Complexity    Jaguar Roe MD  [facility] Hospitalist  06/26/2023

## 2023-06-26 NOTE — PROGRESS NOTES
06/26/23 1215   Handoff Report   Received From Chelita Elizabeth   Given To Jacqui   Vital Signs   Temp 98.8 °F (37.1 °C)   Temp Source Oral   Pulse 78   Resp 18   SpO2 98 %   Pulse Oximetry Type Intermittent   Device (Oxygen Therapy) room air   BP (!) 173/90   BP Location Left arm   BP Method Automatic   Patient Position Lying   Assessments (Pre/Post)   Blood Liters Processed (BLP) 54.2   Transport Modality bed   Level of Consciousness (AVPU) alert   Dialyzer Clearance moderately streaked        Hemodialysis Catheter 06/22/23 1809 right internal jugular   Placement Date/Time: 06/22/23 1809   Present Prior to Hospital Arrival?: No  Hand Hygiene: Performed  Barrier Precautions: Performed  Skin Antisepsis: ChloraPrep  Hemodialysis Catheter Type: Tunneled catheter  Location: right internal jugular  Cathete...   Line Necessity Review CRRT/HD   Site Assessment No drainage;No redness;No swelling;No warmth   Line Securement Device Secured with sutures   Dressing Type CHG impregnated dressing/sponge   Dressing Status Clean;Dry;Intact   Dressing Intervention Integrity maintained   Date on Dressing 06/23/23   Dressing Due to be Changed 06/30/23   Venous Patency/Care flushed w/o difficulty;heparin locked   Arterial Patency/Care flushed w/o difficulty;heparin locked   Waveform Not being transduced   Post-Hemodialysis Assessment   Rinseback Volume (mL) 250 mL   Blood Volume Processed (Liters) 564.2 L   Dialyzer Clearance Moderately streaked   Duration of Treatment 180 minutes   Additional Fluid Intake (mL) 500 mL   Total UF (mL) 1500 mL   Net Fluid Removal 1000   Patient Response to Treatment Tolerated well   Post-Treatment Weight 135.1 kg (297 lb 13.5 oz)   Treatment Weight Change -1   Post-Hemodialysis Comments Tx complete, no issues

## 2023-06-27 LAB
ALBUMIN SERPL BCP-MCNC: 3.8 G/DL (ref 3.5–5.2)
ANION GAP SERPL CALC-SCNC: 11 MMOL/L (ref 8–16)
BASOPHILS # BLD AUTO: 0.03 K/UL (ref 0–0.2)
BASOPHILS NFR BLD: 0.4 % (ref 0–1.9)
BUN SERPL-MCNC: 29 MG/DL (ref 6–20)
CALCIUM SERPL-MCNC: 9.9 MG/DL (ref 8.7–10.5)
CHLORIDE SERPL-SCNC: 102 MMOL/L (ref 95–110)
CO2 SERPL-SCNC: 24 MMOL/L (ref 23–29)
CREAT SERPL-MCNC: 8.4 MG/DL (ref 0.5–1.4)
DIFFERENTIAL METHOD: ABNORMAL
EOSINOPHIL # BLD AUTO: 0.2 K/UL (ref 0–0.5)
EOSINOPHIL NFR BLD: 2.5 % (ref 0–8)
ERYTHROCYTE [DISTWIDTH] IN BLOOD BY AUTOMATED COUNT: 13.2 % (ref 11.5–14.5)
EST. GFR  (NO RACE VARIABLE): 7.5 ML/MIN/1.73 M^2
GLUCOSE SERPL-MCNC: 106 MG/DL (ref 70–110)
HCT VFR BLD AUTO: 34.3 % (ref 40–54)
HGB BLD-MCNC: 11 G/DL (ref 14–18)
HPRA INTERPRETATION: NORMAL
IMM GRANULOCYTES # BLD AUTO: 0.03 K/UL (ref 0–0.04)
IMM GRANULOCYTES NFR BLD AUTO: 0.4 % (ref 0–0.5)
LYMPHOCYTES # BLD AUTO: 1.6 K/UL (ref 1–4.8)
LYMPHOCYTES NFR BLD: 19.6 % (ref 18–48)
MAGNESIUM SERPL-MCNC: 2 MG/DL (ref 1.6–2.6)
MCH RBC QN AUTO: 29.5 PG (ref 27–31)
MCHC RBC AUTO-ENTMCNC: 32.1 G/DL (ref 32–36)
MCV RBC AUTO: 92 FL (ref 82–98)
MONOCYTES # BLD AUTO: 1.2 K/UL (ref 0.3–1)
MONOCYTES NFR BLD: 14 % (ref 4–15)
NEUTROPHILS # BLD AUTO: 5.2 K/UL (ref 1.8–7.7)
NEUTROPHILS NFR BLD: 63.1 % (ref 38–73)
NRBC BLD-RTO: 0 /100 WBC
PHOSPHATE SERPL-MCNC: 5 MG/DL (ref 2.7–4.5)
PLATELET # BLD AUTO: 219 K/UL (ref 150–450)
PMV BLD AUTO: 9.9 FL (ref 9.2–12.9)
POTASSIUM SERPL-SCNC: 4 MMOL/L (ref 3.5–5.1)
RBC # BLD AUTO: 3.73 M/UL (ref 4.6–6.2)
SODIUM SERPL-SCNC: 137 MMOL/L (ref 136–145)
WBC # BLD AUTO: 8.26 K/UL (ref 3.9–12.7)

## 2023-06-27 PROCEDURE — 25000003 PHARM REV CODE 250: Performed by: STUDENT IN AN ORGANIZED HEALTH CARE EDUCATION/TRAINING PROGRAM

## 2023-06-27 PROCEDURE — 63600175 PHARM REV CODE 636 W HCPCS: Performed by: HOSPITALIST

## 2023-06-27 PROCEDURE — 80069 RENAL FUNCTION PANEL: CPT | Performed by: SURGERY

## 2023-06-27 PROCEDURE — 25000003 PHARM REV CODE 250: Performed by: SURGERY

## 2023-06-27 PROCEDURE — 63600175 PHARM REV CODE 636 W HCPCS: Performed by: STUDENT IN AN ORGANIZED HEALTH CARE EDUCATION/TRAINING PROGRAM

## 2023-06-27 PROCEDURE — 93005 ELECTROCARDIOGRAM TRACING: CPT | Performed by: INTERNAL MEDICINE

## 2023-06-27 PROCEDURE — 83735 ASSAY OF MAGNESIUM: CPT | Performed by: SURGERY

## 2023-06-27 PROCEDURE — 63600175 PHARM REV CODE 636 W HCPCS: Performed by: SURGERY

## 2023-06-27 PROCEDURE — 99900035 HC TECH TIME PER 15 MIN (STAT)

## 2023-06-27 PROCEDURE — 93010 EKG 12-LEAD: ICD-10-PCS | Mod: ,,, | Performed by: INTERNAL MEDICINE

## 2023-06-27 PROCEDURE — 94761 N-INVAS EAR/PLS OXIMETRY MLT: CPT

## 2023-06-27 PROCEDURE — 63600175 PHARM REV CODE 636 W HCPCS: Performed by: INTERNAL MEDICINE

## 2023-06-27 PROCEDURE — 27000221 HC OXYGEN, UP TO 24 HOURS

## 2023-06-27 PROCEDURE — 36415 COLL VENOUS BLD VENIPUNCTURE: CPT | Performed by: SURGERY

## 2023-06-27 PROCEDURE — 25000003 PHARM REV CODE 250: Performed by: INTERNAL MEDICINE

## 2023-06-27 PROCEDURE — 25000003 PHARM REV CODE 250: Performed by: HOSPITALIST

## 2023-06-27 PROCEDURE — 90935 HEMODIALYSIS ONE EVALUATION: CPT

## 2023-06-27 PROCEDURE — 85025 COMPLETE CBC W/AUTO DIFF WBC: CPT | Performed by: SURGERY

## 2023-06-27 PROCEDURE — 93010 ELECTROCARDIOGRAM REPORT: CPT | Mod: ,,, | Performed by: INTERNAL MEDICINE

## 2023-06-27 PROCEDURE — 21000000 HC CCU ICU ROOM CHARGE

## 2023-06-27 RX ORDER — METOPROLOL SUCCINATE 25 MG/1
25 TABLET, EXTENDED RELEASE ORAL DAILY
Status: CANCELLED | OUTPATIENT
Start: 2023-06-27

## 2023-06-27 RX ORDER — LORAZEPAM 0.5 MG/1
0.5 TABLET ORAL EVERY 6 HOURS PRN
Status: DISCONTINUED | OUTPATIENT
Start: 2023-06-27 | End: 2023-06-29 | Stop reason: HOSPADM

## 2023-06-27 RX ORDER — HYDRALAZINE HYDROCHLORIDE 20 MG/ML
10 INJECTION INTRAMUSCULAR; INTRAVENOUS EVERY 6 HOURS PRN
Status: DISCONTINUED | OUTPATIENT
Start: 2023-06-27 | End: 2023-06-29 | Stop reason: HOSPADM

## 2023-06-27 RX ORDER — SODIUM CHLORIDE 9 MG/ML
INJECTION, SOLUTION INTRAVENOUS ONCE
Status: CANCELLED | OUTPATIENT
Start: 2023-06-27 | End: 2023-06-27

## 2023-06-27 RX ORDER — ALBUMIN HUMAN 250 G/1000ML
25 SOLUTION INTRAVENOUS ONCE
Status: DISCONTINUED | OUTPATIENT
Start: 2023-06-27 | End: 2023-06-28

## 2023-06-27 RX ORDER — CLONIDINE HYDROCHLORIDE 0.1 MG/1
0.3 TABLET ORAL 3 TIMES DAILY
Status: DISCONTINUED | OUTPATIENT
Start: 2023-06-27 | End: 2023-06-29 | Stop reason: HOSPADM

## 2023-06-27 RX ORDER — LOSARTAN POTASSIUM 50 MG/1
100 TABLET ORAL DAILY
Status: DISCONTINUED | OUTPATIENT
Start: 2023-06-27 | End: 2023-06-29 | Stop reason: HOSPADM

## 2023-06-27 RX ORDER — LABETALOL HYDROCHLORIDE 5 MG/ML
20 INJECTION, SOLUTION INTRAVENOUS ONCE
Status: COMPLETED | OUTPATIENT
Start: 2023-06-27 | End: 2023-06-27

## 2023-06-27 RX ORDER — MORPHINE SULFATE 4 MG/ML
4 INJECTION, SOLUTION INTRAMUSCULAR; INTRAVENOUS EVERY 4 HOURS PRN
Status: DISCONTINUED | OUTPATIENT
Start: 2023-06-27 | End: 2023-06-29 | Stop reason: HOSPADM

## 2023-06-27 RX ADMIN — ONDANSETRON 4 MG: 2 INJECTION INTRAMUSCULAR; INTRAVENOUS at 07:06

## 2023-06-27 RX ADMIN — SEVELAMER CARBONATE 1600 MG: 800 TABLET, FILM COATED ORAL at 12:06

## 2023-06-27 RX ADMIN — PROCHLORPERAZINE EDISYLATE 5 MG: 5 INJECTION INTRAMUSCULAR; INTRAVENOUS at 08:06

## 2023-06-27 RX ADMIN — FUROSEMIDE 80 MG: 40 TABLET ORAL at 08:06

## 2023-06-27 RX ADMIN — CALCITRIOL CAPSULES 0.25 MCG 0.25 MCG: 0.25 CAPSULE ORAL at 10:06

## 2023-06-27 RX ADMIN — LABETALOL HYDROCHLORIDE 20 MG: 5 INJECTION, SOLUTION INTRAVENOUS at 12:06

## 2023-06-27 RX ADMIN — FUROSEMIDE 80 MG: 40 TABLET ORAL at 07:06

## 2023-06-27 RX ADMIN — ISOSORBIDE MONONITRATE 120 MG: 60 TABLET, EXTENDED RELEASE ORAL at 07:06

## 2023-06-27 RX ADMIN — ONDANSETRON 4 MG: 2 INJECTION INTRAMUSCULAR; INTRAVENOUS at 10:06

## 2023-06-27 RX ADMIN — HEPARIN SODIUM 5000 UNITS: 5000 INJECTION INTRAVENOUS; SUBCUTANEOUS at 07:06

## 2023-06-27 RX ADMIN — MORPHINE SULFATE 4 MG: 4 INJECTION, SOLUTION INTRAMUSCULAR; INTRAVENOUS at 12:06

## 2023-06-27 RX ADMIN — HEPARIN SODIUM 4000 UNITS: 1000 INJECTION, SOLUTION INTRAVENOUS; SUBCUTANEOUS at 05:06

## 2023-06-27 RX ADMIN — CALCIUM CARBONATE 1000 MG: 500 TABLET, CHEWABLE ORAL at 08:06

## 2023-06-27 RX ADMIN — CLONIDINE HYDROCHLORIDE 0.2 MG: 0.1 TABLET ORAL at 07:06

## 2023-06-27 RX ADMIN — CLONIDINE HYDROCHLORIDE 0.3 MG: 0.1 TABLET ORAL at 08:06

## 2023-06-27 RX ADMIN — LORAZEPAM 0.5 MG: 0.5 TABLET ORAL at 03:06

## 2023-06-27 RX ADMIN — CALCIUM CARBONATE 1000 MG: 500 TABLET, CHEWABLE ORAL at 10:06

## 2023-06-27 RX ADMIN — HYDRALAZINE HYDROCHLORIDE 10 MG: 20 INJECTION INTRAMUSCULAR; INTRAVENOUS at 07:06

## 2023-06-27 RX ADMIN — METOPROLOL SUCCINATE 150 MG: 50 TABLET, FILM COATED, EXTENDED RELEASE ORAL at 07:06

## 2023-06-27 RX ADMIN — MORPHINE SULFATE 4 MG: 4 INJECTION, SOLUTION INTRAMUSCULAR; INTRAVENOUS at 04:06

## 2023-06-27 RX ADMIN — HEPARIN SODIUM 5000 UNITS: 5000 INJECTION INTRAVENOUS; SUBCUTANEOUS at 10:06

## 2023-06-27 RX ADMIN — FUROSEMIDE 80 MG: 40 TABLET ORAL at 12:06

## 2023-06-27 RX ADMIN — HYDRALAZINE HYDROCHLORIDE 10 MG: 20 INJECTION INTRAMUSCULAR; INTRAVENOUS at 12:06

## 2023-06-27 RX ADMIN — LOSARTAN POTASSIUM 100 MG: 50 TABLET, FILM COATED ORAL at 10:06

## 2023-06-27 RX ADMIN — HYDRALAZINE HYDROCHLORIDE 100 MG: 25 TABLET ORAL at 07:06

## 2023-06-27 RX ADMIN — AMLODIPINE BESYLATE 10 MG: 5 TABLET ORAL at 10:06

## 2023-06-27 RX ADMIN — SEVELAMER CARBONATE 1600 MG: 800 TABLET, FILM COATED ORAL at 07:06

## 2023-06-27 RX ADMIN — OXYCODONE HYDROCHLORIDE AND ACETAMINOPHEN 1 TABLET: 10; 325 TABLET ORAL at 08:06

## 2023-06-27 NOTE — PROGRESS NOTES
"Critical access hospital Medicine Progress Note  Patient Name: João Ham MRN: [id number]   Patient Class: IP- Inpatient  Length of Stay: 5   Admission Date: 6/21/2023  5:51 PM Attending Physician: Jaguar Roe MD   Primary Care Provider: Dawson Granado MD Face-to-Face encounter date: 06/27/2023   Chief Complaint: ABNORMAL LABS (KIDNEY FUNCTIONS ARE ABNORMAL. PT NOT SURE OF NUMBERS. SENT PER DR SALTER)      Subjective:    Interval History   Blood pressure uncontrolled despite increase in blood pressure regimen.    Denies chest pain, shortness of breath, palpitations, abdominal pain, nausea/vomiting.   No concerns/issues overnight reported by the patient or the nursing staff.  Reviewed the labs and discussed the plan of care.   No family present at bedside.     Review of Systems   All other Review of Systems were found to be negative expect for that mentioned already in HPI.     Hospital Course     06/27/2023     allopurinoL  50 mg Oral Every Mon, Fri    amLODIPine  10 mg Oral Daily    calcitRIOL  0.25 mcg Oral Daily    calcium carbonate  1,000 mg Oral TID    cloNIDine  0.3 mg Oral TID    epoetin mily  50 Units/kg Intravenous Every Mon, Wed, Fri    furosemide  80 mg Oral TID AC    heparin (porcine)  5,000 Units Subcutaneous Q8H    hydrALAZINE  100 mg Oral TID    isosorbide mononitrate  120 mg Oral Daily    losartan  100 mg Oral Daily    metoprolol succinate  150 mg Oral Daily    sevelamer carbonate  1,600 mg Oral TID WM       acetaminophen, diphenhydrAMINE, heparin (porcine), hydrALAZINE, LORazepam, morphine, naloxone, ondansetron, oxyCODONE-acetaminophen, prochlorperazine, sodium chloride 0.9%      Objective:   Physical Exam  BP (!) 190/121   Pulse (!) 123 Comment: nurse navid notified  Temp 98.4 °F (36.9 °C) (Temporal)   Resp 18   Ht 6' 2" (1.88 m)   Wt 130.5 kg (287 lb 11.2 oz)   SpO2 95%   BMI 36.94 kg/m²   Constitutional: No distress.   HENT: Atraumatic.   Cardiovascular: Normal " rate, regular rhythm and normal heart sounds.   Pulmonary/Chest: Effort normal. Clear to auscultation bilaterally. No wheezes.   Abdominal: Soft. Bowel sounds are normal. Exhibits no distension and no mass. No tenderness  Neurological: Alert.   Skin: Skin is warm and dry.     Labs and Imaging    Significant Labs: All pertinent labs within the past 24 hours have been reviewed.    Significant Imaging: I have reviewed all pertinent imaging results/findings within the past 24 hours.    I have reviewed the Vitals, labs and imaging as above.     Assessment & Plan:   João Ham is a 42 y.o. male admitted for    Active Hospital Problems    Diagnosis    *ESRD (end stage renal disease)    Chronic heart failure with preserved ejection fraction    Resistant hypertension    Sleep apnea    Hyperparathyroidism    Severe obesity with body mass index (BMI) of 35.0 to 39.9 with comorbidity    Anemia of chronic disease       Plan  HD setup as outpatient  Blood pressures very high and unsafe for discharge. Discussed with nephrology, planning to repeat HD session today  Clonidine 0.3mg TID added along with Losartan 100mg daily  Transfer to [facility], may need infusion if blood pressure stays high  Anticipate discharge home tomorrow if blood pressures improved    Active PT: No  Active OT: No  Active SLP: No    Core measures:  - Code status:   - Diet: Cardiac  VTE Risk Mitigation (From admission, onward)           Ordered     heparin (porcine) injection 5,000 Units  Every 8 hours         06/24/23 0940     heparin (porcine) injection 4,000 Units  As needed (PRN)         06/23/23 0947     IP VTE HIGH RISK PATIENT  Once         06/21/23 2038     Place sequential compression device  Until discontinued         06/21/23 2038                    Discharge Planning:   Discharge Planning   MARIA ISABEL: 06/28    Code Status: Full Code   Is the patient medically ready for discharge?: no    Reason for patient still in hospital (select all that apply):  Patient trending condition  Discharge Plan A: Home with assistance from family        Above encounter included review of the medical records, interviewing and examining the patient face-to-face, discussion with family and other health care providers, ordering and interpreting lab/test results and formulating a plan of care.     Medical Decision Making:  [] Low Complexity  [] Moderate Complexity  [x] High Complexity    Jaguar Roe MD  [facility] Hospitalist  06/27/2023

## 2023-06-27 NOTE — NURSING
pt c/o palpitations b/p 220/123   pain 7 of 10 to dialysis cath site. Dr Alvares notified. Sherron GODFREY RN charge nurse performed EKG. New orders for an additional dose of morphine and a dose of ativan.

## 2023-06-27 NOTE — PLAN OF CARE
Problem: Adult Inpatient Plan of Care  Goal: Plan of Care Review  Outcome: Ongoing, Progressing  Goal: Patient-Specific Goal (Individualized)  Outcome: Ongoing, Progressing  Goal: Absence of Hospital-Acquired Illness or Injury  Outcome: Ongoing, Progressing  Goal: Optimal Comfort and Wellbeing  Outcome: Ongoing, Progressing  Goal: Readiness for Transition of Care  Outcome: Ongoing, Progressing     Problem: Device-Related Complication Risk (Hemodialysis)  Goal: Safe, Effective Therapy Delivery  Outcome: Ongoing, Progressing     Problem: Hemodynamic Instability (Hemodialysis)  Goal: Effective Tissue Perfusion  Outcome: Ongoing, Progressing     Problem: Infection (Hemodialysis)  Goal: Absence of Infection Signs and Symptoms  Outcome: Ongoing, Progressing     Problem: Infection  Goal: Absence of Infection Signs and Symptoms  Outcome: Ongoing, Progressing     Problem: Fall Injury Risk  Goal: Absence of Fall and Fall-Related Injury  Outcome: Ongoing, Progressing     Problem: Pain Acute  Goal: Acceptable Pain Control and Functional Ability  Outcome: Ongoing, Progressing

## 2023-06-27 NOTE — PROGRESS NOTES
"[facility]  Adult Nutrition   Progress Note (Initial Assessment)     SUMMARY     Recommendations  Recommendation/Intervention: 1. Continue current diet. 2.  to obtain daily menu choices.  Goals: 1, Intake will continue to be >/= 75% EEN / EPN.  Nutrition Goal Status: goal met    Dietitian Rounds Brief  RD received and completed consult for diet education 6/22/23. . Patient admit via ER for HD access placement. 42 yr old male with history of CKD stage 5, HTN,HFpEF, anemia of chronic disease and CLOVIS and increasing BUN/CR. Patient eating 100% of therapeutic diet per nursing. Last BM 6/26/23. Pending dc home soon with HD setup. RD to follow for intake, labs, and further diet questions PRN.    Malnutrition Assessment   No overt signs. Pt is obese.    Diet order:   Current Diet Order: Renal diet         Evaluation of Received Nutrient/Fluid Intake  Energy Calories Required: meeting needs  Protein Required: meeting needs  Fluid Required: meeting needs  Tolerance: tolerating     % Intake of Estimated Energy Needs: 75 - 100 %  % Meal Intake: 75 - 100 %      Intake/Output Summary (Last 24 hours) at 6/27/2023 1510  Last data filed at 6/27/2023 0927  Gross per 24 hour   Intake 220 ml   Output 950 ml   Net -730 ml        Anthropometrics  Temp: 98.4 °F (36.9 °C)  Height Method: Stated  Height: 6' 2" (188 cm)  Height (inches): 74 in  Weight Method: Bed Scale  Weight: 130.5 kg (287 lb 11.2 oz)  Weight (lb): 287.7 lb  Ideal Body Weight (IBW), Male: 190 lb  % Ideal Body Weight, Male (lb): 157.92 %  BMI (Calculated): 36.9       Estimated/Assessed Needs  Weight Used For Calorie Calculations: 86 kg (189 lb 9.5 oz) (IBW)  Energy Calorie Requirements (kcal): 9380-1714 (20-25 kcal/kg IBW)  Energy Need Method: Kcal/kg  Protein Requirements: 129-172 gm/day (1.5-2.0 gm/kg IBW)  Weight Used For Protein Calculations: 86 kg (189 lb 9.5 oz) (IBW)  Fluid Requirements (mL): 24 hour UOP + 1000 mL  Estimated Fluid Requirement " Method: other (see comments)  RDA Method (mL): 1720       Reason for Assessment  Reason For Assessment: length of stay  Diagnosis: renal disease  Relevant Medical History: CKD stage 5, hypertension,HFpEF, anemia of chronic disease and CLOVIS  Interdisciplinary Rounds: did not attend    Nutrition/Diet History  Food Allergies: other (see comments) (mushroom)  Factors Affecting Nutritional Intake: None identified at this time    Nutrition Risk Screen  Nutrition Risk Screen: no indicators present     MST Score: 0  Have you recently lost weight without trying?: No  Weight loss score: 0  Have you been eating poorly because of a decreased appetite?: No  Appetite score: 0       Weight History:  Wt Readings from Last 5 Encounters:   06/27/23 130.5 kg (287 lb 11.2 oz)   05/25/23 (!) 137 kg (302 lb 0.5 oz)   04/19/23 (!) 137 kg (302 lb 0.5 oz)   03/03/23 130.9 kg (288 lb 9.3 oz)   03/01/23 129 kg (284 lb 6.3 oz)        Lab/Procedures/Meds: Pertinent Labs/Meds Reviewed    Medications:Pertinent Medications Reviewed  Scheduled Meds:   allopurinoL  50 mg Oral Every Mon, Fri    amLODIPine  10 mg Oral Daily    calcitRIOL  0.25 mcg Oral Daily    calcium carbonate  1,000 mg Oral TID    cloNIDine  0.3 mg Oral TID    epoetin mily  50 Units/kg Intravenous Every Mon, Wed, Fri    furosemide  80 mg Oral TID AC    heparin (porcine)  5,000 Units Subcutaneous Q8H    hydrALAZINE  100 mg Oral TID    isosorbide mononitrate  120 mg Oral Daily    losartan  100 mg Oral Daily    metoprolol succinate  150 mg Oral Daily    sevelamer carbonate  1,600 mg Oral TID WM     Continuous Infusions:  PRN Meds:.acetaminophen, diphenhydrAMINE, heparin (porcine), hydrALAZINE, LORazepam, morphine, naloxone, ondansetron, oxyCODONE-acetaminophen, prochlorperazine, sodium chloride 0.9%    Labs: Pertinent Labs Reviewed  Clinical Chemistry:  Recent Labs   Lab 06/21/23  1827 06/22/23  0446 06/25/23  0703 06/26/23  0527 06/27/23  0600   *   < > 137 135* 137   K 4.2   <  > 4.5 3.8 4.0   CL 98   < > 103 98 102   CO2 26   < > 27 26 24   *   < > 97 102 106   BUN 81*   < > 36* 42* 29*   CREATININE 11.9*   < > 8.5* 9.4* 8.4*   CALCIUM 8.9   < > 9.5 9.3 9.9   PROT 7.6  --   --   --   --    ALBUMIN 3.6   < > 3.5 3.8 3.8   BILITOT 0.6  --   --   --   --    ALKPHOS 41*  --   --   --   --    AST 7*  --   --   --   --    ALT 10  --   --   --   --    ANIONGAP 11   < > 7* 11 11   MG 2.1   < > 2.1 2.0 2.0   PHOS 7.8*   < > 5.9* 6.1* 5.0*    < > = values in this interval not displayed.     CBC:   Recent Labs   Lab 06/27/23  0600   WBC 8.26   RBC 3.73*   HGB 11.0*   HCT 34.3*      MCV 92   MCH 29.5   MCHC 32.1     Lipid Panel:  No results for input(s): CHOL, HDL, LDLCALC, TRIG, CHOLHDL in the last 168 hours.  Cardiac Profile:  Recent Labs   Lab 06/21/23  1827   BNP 77     Inflammatory Labs:  No results for input(s): CRP in the last 168 hours.  Diabetes:  Recent Labs   Lab 06/22/23  0446   HGBA1C 5.5     Thyroid & Parathyroid:  No results for input(s): TSH, FREET4, N5YEEOB, O2YEVOT, THYROIDAB in the last 168 hours.    Monitor and Evaluation  Food and Nutrient Intake: energy intake, food and beverage intake  Food and Nutrient Adminstration: diet order  Knowledge/Beliefs/Attitudes: food and nutrition knowledge/skill  Physical Activity and Function: nutrition-related ADLs and IADLs  Anthropometric Measurements: weight, weight change, body mass index  Biochemical Data, Medical Tests and Procedures: electrolyte and renal panel, gastrointestinal profile, glucose/endocrine profile, inflammatory profile, lipid profile  Nutrition-Focused Physical Findings: overall appearance     Nutrition Risk  Level of Risk/Frequency of Follow-up: moderate     Nutrition Follow-Up  RD Follow-up?: Yes      Sherry Borrero RD, LDN 06/27/2023 3:10 PM

## 2023-06-27 NOTE — NURSING
Received pt from dialysis. Transported back to room on monitor. Oriented pt to room, call light in reach, bed wheels locked, bed in lowest position, and bed alarm on. NAD.

## 2023-06-27 NOTE — NURSING
Patients BP elevated 202/123 even after multiple doses of medication over night and morning mediations: Clonidine 0.2, hydralazine 100, isosorbid mononitrate 120, metoprolol 150, and PRN IV hydralazine 10 given at 0849 /126. MD notified,losartan 100 and amlodipine 10 ordered and given. BP is now 212/129.

## 2023-06-27 NOTE — PROGRESS NOTES
INPATIENT NEPHROLOGY PROGRESS  [facility] NEPHROLOGY    João Ham  06/27/2023    Reason for consultation:    Esrd    Chief Complaint:   Chief Complaint   Patient presents with    ABNORMAL LABS     KIDNEY FUNCTIONS ARE ABNORMAL. PT NOT SURE OF NUMBERS. SENT PER DR SALTER          History of Present Illness:    Per H and P    João Ham is a 42-year-old male with chronic medical problems including CKD stage 5, hypertension,HFpEF, anemia of chronic disease and CLOVIS who presents to the emergency room sent by Nephrology to start dialysis because of increasing BUN/CR.  Patient states that he has been asymptomatic.  He denies shortness or breath, chest pain, increase in swelling, continues to void several times daily, denies headaches or dizziness, abdominal pain or diarrhea, palpitations.  His only complaint is fatigue.     In the ED, vital signs with temperature 97.8°, heart rate 53, blood pressure 127/79, respiratory rate 16 and O2 sat 95%.  BUN/CR 81/11.9, sodium 135, potassium 4.2, serum bicarb 26, anion gap 11, BNP 77 and troponin 19.6, albumin 3.6 calcium 8.9, liver tests within normal limits.  H&H 9.8/30.2, WBC 4.03 and platelets 260.  Chest x-ray was negative for anything acute, 12 lead EKG with sinus bradycardia, ventricular rate 54 and .  He will be admitted to the hospitalist service for further evaluation and treatment with consult to Nephrology and vascular surgery.      6/23  Patient seen on hemodialysis for uremic clearance and ultrafiltration.               C./o pain at tunneled cath site.  No discharge or swelling.  Catheter functioning fine    6/24  tolerated hd yesterday.  AFVSS.   Patient seen on hemodialysis for uremic clearance and ultrafiltration.    6/25  AFVSS.  No nausea, chest pain, sob, fever, urinary or bowel complaint, new neurologic symptoms, new joint pain  6/26  lab results reviewed, VSS.  Seen on dialysis.  Pressures have been high today, will improve w/fluid removal.    6/27   1L off w/HD yesterday.  Pressures very high today, clonidine just adjusted yesterday.  Will order isolated UF today, 2-3 hrs, aim for 3L.    Plan of Care:       Assessment:    ESRD  --dmwf  --clear for d/c once outpt hd set up    Hyperphosphatemia  --renal diet  --continue Sevelamer with meals    Hypertension  --continue outpatient medication  --isolated UF 6/27    Anemia  --erythropoiesis stimulating agent with renal replacement therapy      Secondary hyperparathyroidism  --low phos diet  --continue calcitriol         Thank you for allowing us to participate in this patient's care. We will continue to follow.    Vital Signs:  Temp Readings from Last 3 Encounters:   06/27/23 98.6 °F (37 °C) (Temporal)   04/19/23 99 °F (37.2 °C) (Oral)   03/03/23 98.4 °F (36.9 °C) (Oral)       Pulse Readings from Last 3 Encounters:   06/27/23 109   05/25/23 71   04/19/23 78       BP Readings from Last 3 Encounters:   06/27/23 (!) 202/123   05/25/23 (!) 176/101   04/19/23 (!) 142/90       Weight:  Wt Readings from Last 3 Encounters:   06/27/23 130.5 kg (287 lb 11.2 oz)   05/25/23 (!) 137 kg (302 lb 0.5 oz)   04/19/23 (!) 137 kg (302 lb 0.5 oz)       Past Medical & Surgical History:  Past Medical History:   Diagnosis Date    Allergy     Anemia     Anxiety     CHF (congestive heart failure)     Depression     High blood pressure with chronic kidney disease, stage 5 chronic kidney disease or end stage renal disease     Hypertension        Past Surgical History:   Procedure Laterality Date    HERNIA REPAIR Right     With mesh    INSERTION, CATHETER, TUNNELED N/A 6/22/2023    Procedure: Insertion,catheter,tunneled;  Surgeon: Everett Caicedo MD;  Location: [facility];  Service: General;  Laterality: N/A;       Past Social History:  Social History     Socioeconomic History    Marital status: Single   Tobacco Use    Smoking status: Never     Passive exposure: Never    Smokeless tobacco: Never   Substance and Sexual Activity    Alcohol use:  Never    Drug use: Never    Sexual activity: Not Currently   Social History Narrative    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     Social Determinants of Health     Financial Resource Strain: High Risk    Difficulty of Paying Living Expenses: Hard   Food Insecurity: No Food Insecurity    Worried About Running Out of Food in the Last Year: Never true    Ran Out of Food in the Last Year: Never true   Transportation Needs: No Transportation Needs    Lack of Transportation (Medical): No    Lack of Transportation (Non-Medical): No   Physical Activity: Sufficiently Active    Days of Exercise per Week: 6 days    Minutes of Exercise per Session: 60 min   Stress: Stress Concern Present    Feeling of Stress : Very much   Social Connections: Socially Isolated    Frequency of Communication with Friends and Family: More than three times a week    Frequency of Social Gatherings with Friends and Family: More than three times a week    Attends Buddhism Services: Never    Active Member of Clubs or Organizations: No    Attends Club or Organization Meetings: Never    Marital Status: Never    Housing Stability: Low Risk     Unable to Pay for Housing in the Last Year: No    Number of Places Lived in the Last Year: 2    Unstable Housing in the Last Year: No       Medications:  No current facility-administered medications on file prior to encounter.     Current Outpatient Medications on File Prior to Encounter   Medication Sig Dispense Refill    amLODIPine (NORVASC) 10 MG tablet Take 1 tablet (10 mg total) by mouth once daily. 90 tablet 3    calcitRIOL (ROCALTROL) 0.25 MCG Cap Take 1 capsule by mouth once daily (Patient taking differently: Take 0.25 mcg by mouth once daily.) 90 capsule 1    calcium carbonate (TUMS) 200 mg calcium (500 mg) chewable tablet Take 2 tablets (1,000 mg total) by mouth 3 (three) times daily with meals. (Patient taking differently: Take 1,000 mg by mouth 3 (three) times daily.) 180 tablet 11    cloNIDine (CATAPRES) 0.3  "MG tablet Take 1 tablet (0.3 mg total) by mouth 3 (three) times daily. 270 tablet 1    furosemide (LASIX) 80 MG tablet Take 1 tablet (80 mg total) by mouth 3 (three) times daily with meals. (Patient taking differently: Take 80 mg by mouth 3 (three) times daily.) 90 tablet 11    hydrALAZINE (APRESOLINE) 100 MG tablet Take 1 tablet (100 mg total) by mouth 3 (three) times daily. 90 tablet 11    isosorbide mononitrate (IMDUR) 120 MG 24 hr tablet Take 1 tablet (120 mg total) by mouth once daily. 30 tablet 11    metoprolol succinate (TOPROL-XL) 50 MG 24 hr tablet Take 3 tablets (150 mg total) by mouth once daily. 90 tablet 11    olmesartan (BENICAR) 40 MG tablet Take 1 tablet (40 mg total) by mouth once daily. 90 tablet 1    sevelamer carbonate (RENVELA) 800 mg Tab Take 2 tablets (1,600 mg total) by mouth 3 (three) times daily with meals. 180 tablet 11     Scheduled Meds:   allopurinoL  50 mg Oral Every Mon, Fri    amLODIPine  10 mg Oral Daily    calcitRIOL  0.25 mcg Oral Daily    calcium carbonate  1,000 mg Oral TID    cloNIDine  0.2 mg Oral TID    epoetin mily  50 Units/kg Intravenous Every Mon, Wed, Fri    furosemide  80 mg Oral TID AC    heparin (porcine)  5,000 Units Subcutaneous Q8H    hydrALAZINE  100 mg Oral TID    isosorbide mononitrate  120 mg Oral Daily    metoprolol succinate  150 mg Oral Daily    mupirocin   Nasal BID    sevelamer carbonate  1,600 mg Oral TID WM     Continuous Infusions:  PRN Meds:.acetaminophen, diphenhydrAMINE, heparin (porcine), hydrALAZINE, LORazepam, morphine, naloxone, ondansetron, oxyCODONE-acetaminophen, prochlorperazine, sodium chloride 0.9%    Allergies:  Mushroom    Past Family History:  Reviewed; refer to Hospitalist Admission Note    Review of Systems:  Review of Systems - All 14 systems reviewed and negative, except as noted in HPI    Physical Exam:    BP (!) 202/123 Comment: nurse navid notified  Pulse 109   Temp 98.6 °F (37 °C) (Temporal)   Resp 19   Ht 6' 2" (1.88 m)   " Wt 130.5 kg (287 lb 11.2 oz)   SpO2 95%   BMI 36.94 kg/m²     General Appearance:    Alert, cooperative, no distress, appears stated age   Head:    Normocephalic, without obvious abnormality, atraumatic   Eyes:    PER, conjunctiva/corneas clear, EOM's intact in both eyes        Throat:   Lips, mucosa, and tongue normal; teeth and gums normal   Back:     Symmetric, no curvature, ROM normal, no CVA tenderness   Lungs:     Clear to auscultation bilaterally, respirations unlabored   Chest wall:    No tenderness or deformity   Heart:    Regular rate and rhythm, S1 and S2 normal, no murmur, rub   or gallop   Abdomen:     Soft, non-tender, bowel sounds active all four quadrants,     no masses, no organomegaly   Extremities:   Extremities normal, atraumatic, no cyanosis or edema   Pulses:   2+ and symmetric all extremities   MSK:   No joint or muscle swelling, tenderness or deformity   Skin:   Skin color, texture, turgor normal, no rashes or lesions   Neurologic:   CNII-XII intact, normal strength and sensation       Throughout.  No flap     Results:  Lab Results   Component Value Date     06/27/2023    K 4.0 06/27/2023     06/27/2023    CO2 24 06/27/2023    BUN 29 (H) 06/27/2023    CREATININE 8.4 (H) 06/27/2023    CALCIUM 9.9 06/27/2023    ANIONGAP 11 06/27/2023       Lab Results   Component Value Date    CALCIUM 9.9 06/27/2023    PHOS 5.0 (H) 06/27/2023       Recent Labs   Lab 06/27/23  0600   WBC 8.26   RBC 3.73*   HGB 11.0*   HCT 34.3*      MCV 92   MCH 29.5   MCHC 32.1          Imaging Results              X-Ray Chest PA And Lateral (Final result)  Result time 06/21/23 18:45:20   Procedure changed from X-Ray Chest AP Portable     Final result by Clark Santana MD (06/21/23 18:45:20)                   Narrative:    EXAM DESCRIPTION: XR CHEST PA AND LATERAL    CLINICAL HISTORY: 42 years Male, Acute kidney injury    COMPARISON: None.    FINDINGS: 2 views of the chest were obtained. The heart is at  the upper limits of normal in size. The pulmonary vasculature is normal. No consolidations are seen, nor is there evidence of pleural fluid. No osseous lesions are seen.    IMPRESSION:  No acute cardiopulmonary disease is seen.    Electronically signed by:  Clark Santana MD  6/21/2023 6:45 PM CDT Workstation: [id number]                                      I have personally reviewed pertinent radiological imaging and reports.    Patient care was time spent personally by me on the following activities:   Obtaining a history  Examination of patient.  Providing medical care at the patients bedside.  Developing a treatment plan with patient or surrogate and bedside caregivers  Ordering and reviewing laboratory studies, radiographic studies, pulse oximetry.  Ordering and performing treatments and interventions.  Evaluation of patient's response to treatment.  Discussions with consultants while on the unit and immediately available to the patient.  Re-evaluation of the patient's condition.  Documentation in the medical record.     Kellie Doty NP    Nephrology  [facility]  [phone]

## 2023-06-27 NOTE — NURSING
Notified Dr Alvares pt's b/p was 206/123, I adm 4mg morphine and hour ago and 100mg hydralazine, b/p now 192/113 . She ordered clonidine 0.2mg for pt.

## 2023-06-27 NOTE — PROGRESS NOTES
[facility]  Adult Nutrition   Progress Note (Initial Assessment)     SUMMARY      Reason for Assessment  Reason For Assessment: length of stay  Diagnosis: renal disease  Relevant Medical History: CKD stage 5, hypertension,HFpEF, anemia of chronic disease and CLOVIS  Interdisciplinary Rounds: did not attend    Medications:Pertinent Medications Reviewed  Scheduled Meds:      onitor and Evaluation  Food and Nutrient Intake: energy intake, food and beverage intake  Food and Nutrient Adminstration: diet order  Knowledge/Beliefs/Attitudes: food and nutrition knowledge/skill  Physical Activity and Function: nutrition-related ADLs and IADLs  Anthropometric Measurements: weight, weight change, body mass index  Biochemical Data, Medical Tests and Procedures: electrolyte and renal panel, gastrointestinal profile, glucose/endocrine profile, inflammatory profile, lipid profile  Nutrition-Focused Physical Findings: overall appearance     Nutrition Risk  Level of Risk/Frequency of Follow-up: moderate     Nutrition Follow-Up  RD Follow-up?: Yes    Sherry Borrero RD 09/14/2023 2:57 PM

## 2023-06-27 NOTE — PROGRESS NOTES
06/27/23 1715   Required for all Hemodialysis Patients   Hepatitis Status negative   Treatment Type   Treatment Type Acute   Vital Signs   Temp 98.8 °F (37.1 °C)   Temp Source Oral   Pulse (!) 131   Heart Rate Source Monitor   Resp 20   SpO2 97 %   Pulse Oximetry Type Intermittent   Flow (L/min) 2   Device (Oxygen Therapy) nasal cannula   /89   BP Location Left arm   BP Method Automatic   Patient Position Lying   Assessments (Pre/Post)   Consent Obtained yes   Date Hepatitis Profile Obtained 06/22/23   Blood Liters Processed (BLP) 0   Transport Modality bed   Level of Consciousness (AVPU) alert   Dialyzer Clearance moderately streaked        Hemodialysis Catheter 06/22/23 1809 right internal jugular   Placement Date/Time: 06/22/23 1809   Present Prior to Hospital Arrival?: No  Hand Hygiene: Performed  Barrier Precautions: Performed  Skin Antisepsis: ChloraPrep  Hemodialysis Catheter Type: Tunneled catheter  Location: right internal jugular  Cathete...   Line Necessity Review CRRT/HD   Site Assessment No drainage;No redness;No swelling;No warmth   Line Securement Device Secured with sutures   Dressing Type CHG impregnated dressing/sponge;Central line dressing   Dressing Status Clean;Dry;Intact   Dressing Intervention Integrity maintained   Date on Dressing 06/23/23   Dressing Due to be Changed 06/29/23   Venous Patency/Care flushed w/o difficulty;heparin locked  (Capped)   Arterial Patency/Care flushed w/o difficulty;heparin locked  (Capped)   Post-Hemodialysis Assessment   Rinseback Volume (mL) 250 mL   Blood Volume Processed (Liters) 0 L   Dialyzer Clearance Moderately streaked   Duration of Treatment 145 minutes   Additional Fluid Intake (mL) 500 mL   Total UF (mL) 2780 mL   Net Fluid Removal 2280   Patient Response to Treatment Pulse kept increasing, see notes   Post-Treatment Weight 127.1 kg (280 lb 3.3 oz)   Treatment Weight Change -1.7   Post-Hemodialysis Comments HD complete/Blood reinfused per  protocol   Edema   Edema generalized     Pt educated on Dialysis and Treatment for elevated pulse    1720  Report given to Prisca BENITEZ

## 2023-06-27 NOTE — PROGRESS NOTES
06/27/23 1715   Required for all Hemodialysis Patients   Hepatitis Status negative   Treatment Type   Treatment Type Acute   Vital Signs   Temp 98.8 °F (37.1 °C)   Temp Source Oral   Pulse (!) 131   Heart Rate Source Monitor   Resp 20   SpO2 97 %   Pulse Oximetry Type Intermittent   Flow (L/min) 2   Device (Oxygen Therapy) nasal cannula   /89   BP Location Left arm   BP Method Automatic   Patient Position Lying   Assessments (Pre/Post)   Consent Obtained yes   Date Hepatitis Profile Obtained 06/22/23   Blood Liters Processed (BLP) 0   Transport Modality bed   Level of Consciousness (AVPU) alert   Dialyzer Clearance moderately streaked        Hemodialysis Catheter 06/22/23 1809 right internal jugular   Placement Date/Time: 06/22/23 1809   Present Prior to Hospital Arrival?: No  Hand Hygiene: Performed  Barrier Precautions: Performed  Skin Antisepsis: ChloraPrep  Hemodialysis Catheter Type: Tunneled catheter  Location: right internal jugular  Cathete...   Line Necessity Review CRRT/HD   Site Assessment No drainage;No redness;No swelling;No warmth   Line Securement Device Secured with sutures   Dressing Type CHG impregnated dressing/sponge;Central line dressing   Dressing Status Clean;Dry;Intact   Dressing Intervention Integrity maintained   Date on Dressing 06/23/23   Dressing Due to be Changed 06/29/23   Venous Patency/Care flushed w/o difficulty;heparin locked  (Capped)   Arterial Patency/Care flushed w/o difficulty;heparin locked  (Capped)   Post-Hemodialysis Assessment   Rinseback Volume (mL) 250 mL   Blood Volume Processed (Liters) 0 L   Dialyzer Clearance Moderately streaked   Duration of Treatment 145 minutes   Additional Fluid Intake (mL) 500 mL   Total UF (mL) 2780 mL   Net Fluid Removal 2280   Patient Response to Treatment Pulse kept increasing, see notes   Post-Treatment Weight 127.1 kg (280 lb 3.3 oz)   Treatment Weight Change -1.7   Post-Hemodialysis Comments UF complete/Blood reinfused per  protocol   Edema   Edema generalized     Pt educated on Dialysis UF and Treatment for Elevated Pulse    1720  Report given to Prisca BENITEZ

## 2023-06-27 NOTE — NURSING
Notified Dr Alvares pt's b/p 186/112  2 hrs after clonidine 0.2mg. She ordered prn IV hydralazine. Pt is in no distress.

## 2023-06-28 ENCOUNTER — PATIENT MESSAGE (OUTPATIENT)
Dept: FAMILY MEDICINE | Facility: CLINIC | Age: 43
End: 2023-06-28
Payer: COMMERCIAL

## 2023-06-28 VITALS
RESPIRATION RATE: 20 BRPM | TEMPERATURE: 99 F | WEIGHT: 283.94 LBS | BODY MASS INDEX: 36.44 KG/M2 | OXYGEN SATURATION: 96 % | DIASTOLIC BLOOD PRESSURE: 64 MMHG | HEIGHT: 74 IN | SYSTOLIC BLOOD PRESSURE: 107 MMHG | HEART RATE: 80 BPM

## 2023-06-28 LAB
ALBUMIN SERPL BCP-MCNC: 4 G/DL (ref 3.5–5.2)
ANION GAP SERPL CALC-SCNC: 13 MMOL/L (ref 8–16)
BASOPHILS # BLD AUTO: 0.02 K/UL (ref 0–0.2)
BASOPHILS NFR BLD: 0.3 % (ref 0–1.9)
BUN SERPL-MCNC: 37 MG/DL (ref 6–20)
CALCIUM SERPL-MCNC: 10.5 MG/DL (ref 8.7–10.5)
CHLORIDE SERPL-SCNC: 99 MMOL/L (ref 95–110)
CO2 SERPL-SCNC: 27 MMOL/L (ref 23–29)
CREAT SERPL-MCNC: 11.4 MG/DL (ref 0.5–1.4)
DIFFERENTIAL METHOD: ABNORMAL
EOSINOPHIL # BLD AUTO: 0.1 K/UL (ref 0–0.5)
EOSINOPHIL NFR BLD: 0.9 % (ref 0–8)
ERYTHROCYTE [DISTWIDTH] IN BLOOD BY AUTOMATED COUNT: 13.4 % (ref 11.5–14.5)
EST. GFR  (NO RACE VARIABLE): 5.2 ML/MIN/1.73 M^2
GLUCOSE SERPL-MCNC: 112 MG/DL (ref 70–110)
HCT VFR BLD AUTO: 37.7 % (ref 40–54)
HGB BLD-MCNC: 11.8 G/DL (ref 14–18)
IMM GRANULOCYTES # BLD AUTO: 0.02 K/UL (ref 0–0.04)
IMM GRANULOCYTES NFR BLD AUTO: 0.3 % (ref 0–0.5)
LYMPHOCYTES # BLD AUTO: 1.3 K/UL (ref 1–4.8)
LYMPHOCYTES NFR BLD: 17.2 % (ref 18–48)
MAGNESIUM SERPL-MCNC: 2.4 MG/DL (ref 1.6–2.6)
MCH RBC QN AUTO: 29.7 PG (ref 27–31)
MCHC RBC AUTO-ENTMCNC: 31.3 G/DL (ref 32–36)
MCV RBC AUTO: 95 FL (ref 82–98)
MONOCYTES # BLD AUTO: 1.5 K/UL (ref 0.3–1)
MONOCYTES NFR BLD: 19.2 % (ref 4–15)
NEUTROPHILS # BLD AUTO: 4.8 K/UL (ref 1.8–7.7)
NEUTROPHILS NFR BLD: 62.1 % (ref 38–73)
NRBC BLD-RTO: 0 /100 WBC
PHOSPHATE SERPL-MCNC: 5.4 MG/DL (ref 2.7–4.5)
PLATELET # BLD AUTO: 237 K/UL (ref 150–450)
PMV BLD AUTO: 10.5 FL (ref 9.2–12.9)
POTASSIUM SERPL-SCNC: 4.8 MMOL/L (ref 3.5–5.1)
RBC # BLD AUTO: 3.97 M/UL (ref 4.6–6.2)
SODIUM SERPL-SCNC: 139 MMOL/L (ref 136–145)
WBC # BLD AUTO: 7.69 K/UL (ref 3.9–12.7)

## 2023-06-28 PROCEDURE — 25000003 PHARM REV CODE 250: Performed by: SURGERY

## 2023-06-28 PROCEDURE — 25000003 PHARM REV CODE 250: Performed by: INTERNAL MEDICINE

## 2023-06-28 PROCEDURE — 90935 HEMODIALYSIS ONE EVALUATION: CPT

## 2023-06-28 PROCEDURE — 63600175 PHARM REV CODE 636 W HCPCS: Mod: JZ | Performed by: INTERNAL MEDICINE

## 2023-06-28 PROCEDURE — 36415 COLL VENOUS BLD VENIPUNCTURE: CPT | Performed by: SURGERY

## 2023-06-28 PROCEDURE — 83735 ASSAY OF MAGNESIUM: CPT | Performed by: SURGERY

## 2023-06-28 PROCEDURE — 80069 RENAL FUNCTION PANEL: CPT | Performed by: SURGERY

## 2023-06-28 PROCEDURE — 85025 COMPLETE CBC W/AUTO DIFF WBC: CPT | Performed by: SURGERY

## 2023-06-28 PROCEDURE — 99900035 HC TECH TIME PER 15 MIN (STAT)

## 2023-06-28 PROCEDURE — 25000003 PHARM REV CODE 250: Performed by: STUDENT IN AN ORGANIZED HEALTH CARE EDUCATION/TRAINING PROGRAM

## 2023-06-28 PROCEDURE — 94761 N-INVAS EAR/PLS OXIMETRY MLT: CPT

## 2023-06-28 PROCEDURE — 27000221 HC OXYGEN, UP TO 24 HOURS

## 2023-06-28 PROCEDURE — 63600175 PHARM REV CODE 636 W HCPCS: Performed by: STUDENT IN AN ORGANIZED HEALTH CARE EDUCATION/TRAINING PROGRAM

## 2023-06-28 RX ORDER — FUROSEMIDE 80 MG/1
80 TABLET ORAL 2 TIMES DAILY
Qty: 60 TABLET | Refills: 0 | Status: SHIPPED | OUTPATIENT
Start: 2023-06-28 | End: 2023-07-28

## 2023-06-28 RX ORDER — ALLOPURINOL 100 MG/1
50 TABLET ORAL
Qty: 4 TABLET | Refills: 0 | Status: SHIPPED | OUTPATIENT
Start: 2023-06-30 | End: 2023-07-30

## 2023-06-28 RX ORDER — METOPROLOL SUCCINATE 50 MG/1
150 TABLET, EXTENDED RELEASE ORAL DAILY
Qty: 90 TABLET | Refills: 0 | Status: SHIPPED | OUTPATIENT
Start: 2023-06-28 | End: 2023-07-28

## 2023-06-28 RX ORDER — CLONIDINE HYDROCHLORIDE 0.3 MG/1
0.3 TABLET ORAL 3 TIMES DAILY
Qty: 90 TABLET | Refills: 0 | Status: SHIPPED | OUTPATIENT
Start: 2023-06-28 | End: 2023-06-29 | Stop reason: SDUPTHER

## 2023-06-28 RX ORDER — ISOSORBIDE MONONITRATE 120 MG/1
120 TABLET, EXTENDED RELEASE ORAL DAILY
Qty: 30 TABLET | Refills: 0 | Status: SHIPPED | OUTPATIENT
Start: 2023-06-28 | End: 2023-07-28

## 2023-06-28 RX ORDER — FUROSEMIDE 40 MG/1
80 TABLET ORAL 2 TIMES DAILY
Status: DISCONTINUED | OUTPATIENT
Start: 2023-06-28 | End: 2023-06-29 | Stop reason: HOSPADM

## 2023-06-28 RX ORDER — HEPARIN SODIUM 1000 [USP'U]/ML
4000 INJECTION, SOLUTION INTRAVENOUS; SUBCUTANEOUS
Status: CANCELLED | OUTPATIENT
Start: 2023-06-28

## 2023-06-28 RX ORDER — OXYCODONE AND ACETAMINOPHEN 10; 325 MG/1; MG/1
1 TABLET ORAL EVERY 6 HOURS PRN
Qty: 12 TABLET | Refills: 0 | Status: SHIPPED | OUTPATIENT
Start: 2023-06-28 | End: 2023-07-01

## 2023-06-28 RX ORDER — AMLODIPINE BESYLATE 10 MG/1
10 TABLET ORAL DAILY
Qty: 30 TABLET | Refills: 0 | Status: SHIPPED | OUTPATIENT
Start: 2023-06-28 | End: 2023-06-29 | Stop reason: ALTCHOICE

## 2023-06-28 RX ORDER — LOSARTAN POTASSIUM 100 MG/1
100 TABLET ORAL DAILY
Qty: 30 TABLET | Refills: 0 | Status: SHIPPED | OUTPATIENT
Start: 2023-06-29 | End: 2023-07-17

## 2023-06-28 RX ADMIN — METOPROLOL SUCCINATE 150 MG: 50 TABLET, FILM COATED, EXTENDED RELEASE ORAL at 08:06

## 2023-06-28 RX ADMIN — FUROSEMIDE 80 MG: 40 TABLET ORAL at 09:06

## 2023-06-28 RX ADMIN — FUROSEMIDE 80 MG: 40 TABLET ORAL at 05:06

## 2023-06-28 RX ADMIN — SEVELAMER CARBONATE 1600 MG: 800 TABLET, FILM COATED ORAL at 01:06

## 2023-06-28 RX ADMIN — CALCITRIOL CAPSULES 0.25 MCG 0.25 MCG: 0.25 CAPSULE ORAL at 08:06

## 2023-06-28 RX ADMIN — ERYTHROPOIETIN 6800 UNITS: 10000 INJECTION, SOLUTION INTRAVENOUS; SUBCUTANEOUS at 09:06

## 2023-06-28 RX ADMIN — SEVELAMER CARBONATE 1600 MG: 800 TABLET, FILM COATED ORAL at 04:06

## 2023-06-28 RX ADMIN — CALCIUM CARBONATE 1000 MG: 500 TABLET, CHEWABLE ORAL at 09:06

## 2023-06-28 RX ADMIN — LOSARTAN POTASSIUM 100 MG: 50 TABLET, FILM COATED ORAL at 08:06

## 2023-06-28 RX ADMIN — OXYCODONE HYDROCHLORIDE AND ACETAMINOPHEN 1 TABLET: 10; 325 TABLET ORAL at 05:06

## 2023-06-28 RX ADMIN — OXYCODONE HYDROCHLORIDE AND ACETAMINOPHEN 1 TABLET: 10; 325 TABLET ORAL at 09:06

## 2023-06-28 RX ADMIN — HEPARIN SODIUM 5000 UNITS: 5000 INJECTION INTRAVENOUS; SUBCUTANEOUS at 04:06

## 2023-06-28 RX ADMIN — CALCIUM CARBONATE 1000 MG: 500 TABLET, CHEWABLE ORAL at 08:06

## 2023-06-28 RX ADMIN — HEPARIN SODIUM 5000 UNITS: 5000 INJECTION INTRAVENOUS; SUBCUTANEOUS at 09:06

## 2023-06-28 RX ADMIN — AMLODIPINE BESYLATE 10 MG: 5 TABLET ORAL at 08:06

## 2023-06-28 RX ADMIN — HYDRALAZINE HYDROCHLORIDE 100 MG: 25 TABLET ORAL at 08:06

## 2023-06-28 RX ADMIN — ISOSORBIDE MONONITRATE 120 MG: 60 TABLET, EXTENDED RELEASE ORAL at 08:06

## 2023-06-28 RX ADMIN — SEVELAMER CARBONATE 1600 MG: 800 TABLET, FILM COATED ORAL at 08:06

## 2023-06-28 RX ADMIN — HEPARIN SODIUM 5000 UNITS: 5000 INJECTION INTRAVENOUS; SUBCUTANEOUS at 05:06

## 2023-06-28 RX ADMIN — CALCIUM CARBONATE 1000 MG: 500 TABLET, CHEWABLE ORAL at 04:06

## 2023-06-28 RX ADMIN — CLONIDINE HYDROCHLORIDE 0.3 MG: 0.1 TABLET ORAL at 08:06

## 2023-06-28 NOTE — PLAN OF CARE
06/28/23 1509   Final Note   Assessment Type Final Discharge Note   Anticipated Discharge Disposition Home   What phone number can be called within the next 1-3 days to see how you are doing after discharge? [phone]   Hospital Resources/Appts/Education Provided Dilaysis schedule provided;Post-Acute resouces added to AVS   Post-Acute Status   Post-Acute Authorization Dialysis   Diaylsis Status Set-up Complete/Auth obtained   Discharge Delays None known at this time     Patient cleared for discharge from case management standpoint.    Follow up appointments scheduled and added to AVS.    Chart and discharge orders reviewed.  Patient discharged home with no further case management needs.

## 2023-06-28 NOTE — DISCHARGE SUMMARY
[facility]  Discharge Summary  Patient Name: João Ham MRN: [id number]   Patient Class: IP- Inpatient  Length of Stay: 6   Admission Date: 6/21/2023  5:51 PM Attending Physician: Jaguar Roe MD   Primary Care Provider: Dawson Granado MD Face-to-Face encounter date: 06/28/2023   Chief Complaint: ABNORMAL LABS (KIDNEY FUNCTIONS ARE ABNORMAL. PT NOT SURE OF NUMBERS. SENT PER DR SALTER)    Date of Discharge: 6/28/2023  Discharge Disposition:Home or Self Care    Condition: Stable       Reason for Hospitalization     Active Hospital Problems    Diagnosis    *ESRD (end stage renal disease)    Chronic heart failure with preserved ejection fraction    Resistant hypertension    Sleep apnea    Hyperparathyroidism    Severe obesity with body mass index (BMI) of 35.0 to 39.9 with comorbidity    Anemia of chronic disease         Brief History of Present Illness    João Ham is a 42 y.o.  male who  has a past medical history of Allergy, Anemia, Anxiety, CHF (congestive heart failure), Depression, High blood pressure with chronic kidney disease, stage 5 chronic kidney disease or end stage renal disease, and Hypertension.. The patient presented to [facility] on 6/21/2023 with a primary complaint of ABNORMAL LABS (KIDNEY FUNCTIONS ARE ABNORMAL. PT NOT SURE OF NUMBERS. SENT PER DR SALTER)  .     For the full HPI please refer to the History & Physical from this admission.    Hospital Course By Problem with Pertinent Findings     Mr. João Ham, a 42-year-old male with a medical history of chronic kidney disease stage 5, hypertension, heart failure with preserved ejection fraction, anemia of chronic disease, and obstructive sleep apnea, was directed by his Nephrologist to present to the emergency department for initiation of dialysis due to rising BUN/CR. Despite his lab values, Mr. Ham reports no associated symptoms such as shortness of breath, chest pain, increased edema, changes in  urinary frequency, headaches, dizziness, abdominal pain, diarrhea, or palpitations. His only expressed concern was fatigue.    On arrival, his vital signs were stable with a temperature of 97.8°F, heart rate of 53, blood pressure of 127/79 mmHg, respiratory rate of 16 breaths per minute, and oxygen saturation at 95% on room air. Laboratory findings showed BUN/CR of 81/11.9, sodium of 135, potassium 4.2, serum bicarbonate 26, anion gap 11, BNP 77, troponin 19.6, albumin 3.6, calcium 8.9, and liver function tests were within normal limits. His hemoglobin and hematocrit were 9.8 and 30.2 respectively, white blood cell count was 4.03, and platelet count was 260. Chest X-ray did not reveal any acute pathological findings, and a 12-lead EKG showed sinus bradycardia with a ventricular rate of 54 and QTc of 441. He was admitted under the care of the hospitalist service for further management and initiated on dialysis with consultation from Nephrology and Vascular Surgery teams.    June 22 - Vascular  placed dialysis cath with no issues.     On June 23, Mr. Ham underwent hemodialysis for uremic clearance and ultrafiltration. He complained of pain at the tunneled catheter site with no discharge or swelling observed. The catheter was functioning well.    On June 24, he tolerated hemodialysis without complications. Vital signs were stable and there were no new symptoms reported.    On June 25, Mr. Ham continued to remain stable with no new complaints.    On June 26, his lab results were reviewed. He was noted to have high blood pressure during his dialysis session, which was thought to be due to fluid overload and would improve with ultrafiltration.    On June 27, he had 1L fluid removed via hemodialysis. His blood pressure remained elevated, leading to an adjustment of his clonidine medication. An isolated ultrafiltration was ordered for 2-3 hours with a goal of 3L fluid removal.    On June 28, 2.2L of fluid was removed  "through ultrafiltration, resulting in improved heart rate control and better blood pressure management. Discharged back home in stable condition.     Patient was seen and examined on the date of discharge and determined to be suitable for discharge.    Physical Exam  /88   Pulse 81   Temp 97.8 °F (36.6 °C) (Axillary)   Resp 15   Ht 6' 2" (1.88 m)   Wt 128.8 kg (283 lb 15.2 oz)   SpO2 99%   BMI 36.46 kg/m²   Vitals reviewed.    Constitutional: No distress.   HENT: Atraumatic.   Cardiovascular: Normal rate, regular rhythm and normal heart sounds.   Pulmonary/Chest: Effort normal. Clear to auscultation bilaterally. No wheezes.   Abdominal: Soft. Bowel sounds are normal. Exhibits no distension and no mass. No tenderness  Neurological: Alert.   Skin: Skin is warm and dry.     Following labs were Reviewed   Recent Labs   Lab 06/28/23  0315   WBC 7.69   HGB 11.8*   HCT 37.7*      CALCIUM 10.5   ALBUMIN 4.0      K 4.8   CO2 27   CL 99   BUN 37*   CREATININE 11.4*     No results found for: POCTGLUCOSE     All labs within the past 24 hours have been reviewed    Microbiology Results (last 7 days)       ** No results found for the last 168 hours. **          X-Ray Chest 1 View   Final Result      X-Ray Chest 1 View   Final Result      FL Fluoro For Venous Access   Final Result      X-Ray Chest PA And Lateral   Final Result          No results found for this or any previous visit.      Consultants and Procedures   Consultants:  Consults (From admission, onward)          Status Ordering Provider     Inpatient consult to Social Work/Case Management  Once        Provider:  (Not yet assigned)    Completed BRUNILDA ALEXIS     Inpatient consult to Vascular Surgery  Once        Provider:  Sierra Caicedo MD    Acknowledged SIERRA CAICEDO     Inpatient consult to Nephrology  Once        Provider:  Oni Garcia MD    Completed NOY BUTLER     Inpatient consult to Registered " Dietitian/Nutritionist  Once        Provider:  (Not yet assigned)    Completed NOY BUTLER     Inpatient consult to Nephrology  Once        Provider:  Oni Garcia MD    Acknowledged SIERRA MUÑOZ            Procedures:   Procedure(s) (LRB):  Insertion,catheter,tunneled (N/A)     Discharge Information:   Diet:  Resume cardiac diet    Physical Activity:  Activity as tolerated    Instructions:  1. Take all medications as prescribed  2. Keep all follow-up appointments  3. Return to the hospital or call your primary care physicians if any worsening symptoms such as chest pain, shortness of breath occur.    Follow-Up Appointments:  Please call your primary care physician to schedule an appointment in 1 week time.    Pending laboratory work/Tests to be performed/followed by the Primary care Physician: None    The patient was discharged in the care of her parents//wife/family/caregiver, with discharge instructions were reviewed in written and verbal form. All pertinent questions were discussed and prescriptions were provided. The importance of making follow up appointments and compliance of medications has been stressed repeatedly. The patient will follow up in 1 week or sooner as needed with the PCP, and the patient is on board with the plan. Upon discharge, patient needs to be on following medications.    Discharge Medications:     Medication List        START taking these medications      losartan 100 MG tablet  Commonly known as: COZAAR  Take 1 tablet (100 mg total) by mouth once daily.  Start taking on: June 29, 2023     oxyCODONE-acetaminophen  mg per tablet  Commonly known as: PERCOCET  Take 1 tablet by mouth every 6 (six) hours as needed for Pain.            CHANGE how you take these medications      allopurinoL 100 MG tablet  Commonly known as: ZYLOPRIM  Take 0.5 tablets (50 mg total) by mouth every Mon, Fri.  Start taking on: June 30, 2023  What changed: when to take this     calcitRIOL  0.25 MCG Cap  Commonly known as: ROCALTROL  Take 1 capsule by mouth once daily  What changed: when to take this     calcium carbonate 200 mg calcium (500 mg) chewable tablet  Commonly known as: TUMS  Take 2 tablets (1,000 mg total) by mouth 3 (three) times daily with meals.  What changed: when to take this     furosemide 80 MG tablet  Commonly known as: LASIX  Take 1 tablet (80 mg total) by mouth 2 (two) times daily.  What changed: when to take this            CONTINUE taking these medications      amLODIPine 10 MG tablet  Commonly known as: NORVASC  Take 1 tablet (10 mg total) by mouth once daily.     cloNIDine 0.3 MG tablet  Commonly known as: CATAPRES  Take 1 tablet (0.3 mg total) by mouth 3 (three) times daily.     hydrALAZINE 100 MG tablet  Commonly known as: APRESOLINE  Take 1 tablet (100 mg total) by mouth 3 (three) times daily.     isosorbide mononitrate 120 MG 24 hr tablet  Commonly known as: IMDUR  Take 1 tablet (120 mg total) by mouth once daily.     metoprolol succinate 50 MG 24 hr tablet  Commonly known as: TOPROL-XL  Take 3 tablets (150 mg total) by mouth once daily.     sevelamer carbonate 800 mg Tab  Commonly known as: RENVELA  Take 2 tablets (1,600 mg total) by mouth 3 (three) times daily with meals.            STOP taking these medications      olmesartan 40 MG tablet  Commonly known as: BENICAR               Where to Get Your Medications        These medications were sent to [facility]  [address], [place] [address]      Hours: Mon-Fri, 8a-5:30p Phone: [phone]   allopurinoL 100 MG tablet  amLODIPine 10 MG tablet  cloNIDine 0.3 MG tablet  furosemide 80 MG tablet  isosorbide mononitrate 120 MG 24 hr tablet  losartan 100 MG tablet  metoprolol succinate 50 MG 24 hr tablet  oxyCODONE-acetaminophen  mg per tablet           I spent 40 minutes preparing the discharge including reviewing records from previous encounters, preparation of discharge summary,  assessing and final examination of the patient, discharge medicine reconciliation, discussing plan of care, follow up and education and prescriptions.       Jaguar Roe  [facility] Hospitalist  06/28/2023

## 2023-06-28 NOTE — PROGRESS NOTES
INPATIENT NEPHROLOGY PROGRESS NOTE  [facility] NEPHROLOGY    João Ham  06/28/2023    Reason for consultation:    Esrd    Chief Complaint:   Chief Complaint   Patient presents with    ABNORMAL LABS     KIDNEY FUNCTIONS ARE ABNORMAL. PT NOT SURE OF NUMBERS. SENT PER DR SALTER          History of Present Illness:    Per H and P    João Ham is a 42-year-old male with chronic medical problems including CKD stage 5, hypertension,HFpEF, anemia of chronic disease and CLOVIS who presents to the emergency room sent by Nephrology to start dialysis because of increasing BUN/CR.  Patient states that he has been asymptomatic.  He denies shortness or breath, chest pain, increase in swelling, continues to void several times daily, denies headaches or dizziness, abdominal pain or diarrhea, palpitations.  His only complaint is fatigue.     In the ED, vital signs with temperature 97.8°, heart rate 53, blood pressure 127/79, respiratory rate 16 and O2 sat 95%.  BUN/CR 81/11.9, sodium 135, potassium 4.2, serum bicarb 26, anion gap 11, BNP 77 and troponin 19.6, albumin 3.6 calcium 8.9, liver tests within normal limits.  H&H 9.8/30.2, WBC 4.03 and platelets 260.  Chest x-ray was negative for anything acute, 12 lead EKG with sinus bradycardia, ventricular rate 54 and .  He will be admitted to the hospitalist service for further evaluation and treatment with consult to Nephrology and vascular surgery.      6/23  Patient seen on hemodialysis for uremic clearance and ultrafiltration. C./o pain at tunneled cath site.  No discharge or swelling.  Catheter functioning fine  6/24  tolerated hd yesterday.  AFVSS.   Patient seen on hemodialysis for uremic clearance and ultrafiltration.    6/25  AFVSS.  No nausea, chest pain, sob, fever, urinary or bowel complaint, new neurologic symptoms, new joint pain  6/26  lab results reviewed, VSS.  Seen on dialysis.  Pressures have been high today, will improve w/fluid removal.    6/27  1L off w/HD  yesterday.  Pressures very high today, clonidine just adjusted yesterday.  Will order isolated UF today, 2-3 hrs, aim for 3L.  6/28 got off 2.2L yest, HR controlled, BP better    Plan of Care:     ESRD on HD MWF  --can be discharged after HD today  --has outpatient HD MWF to start Friday    Hypertension  --controlled with current regimen and UF  --make lasix BID    SHPT  --renal diet  --continue tums and sevelamer with meals  --continue calcitriol    Anemia  --erythropoiesis stimulating agent with renal replacement therapy    Thank you for allowing us to participate in this patient's care. We will continue to follow.    Vital Signs:  Temp Readings from Last 3 Encounters:   06/28/23 97.7 °F (36.5 °C) (Axillary)   04/19/23 99 °F (37.2 °C) (Oral)   03/03/23 98.4 °F (36.9 °C) (Oral)       Pulse Readings from Last 3 Encounters:   06/28/23 81   05/25/23 71   04/19/23 78       BP Readings from Last 3 Encounters:   06/28/23 133/88   05/25/23 (!) 176/101   04/19/23 (!) 142/90       Weight:  Wt Readings from Last 3 Encounters:   06/28/23 128.8 kg (283 lb 15.2 oz)   05/25/23 (!) 137 kg (302 lb 0.5 oz)   04/19/23 (!) 137 kg (302 lb 0.5 oz)     Medications:  No current facility-administered medications on file prior to encounter.     Current Outpatient Medications on File Prior to Encounter   Medication Sig Dispense Refill    amLODIPine (NORVASC) 10 MG tablet Take 1 tablet (10 mg total) by mouth once daily. 90 tablet 3    calcitRIOL (ROCALTROL) 0.25 MCG Cap Take 1 capsule by mouth once daily (Patient taking differently: Take 0.25 mcg by mouth once daily.) 90 capsule 1    calcium carbonate (TUMS) 200 mg calcium (500 mg) chewable tablet Take 2 tablets (1,000 mg total) by mouth 3 (three) times daily with meals. (Patient taking differently: Take 1,000 mg by mouth 3 (three) times daily.) 180 tablet 11    cloNIDine (CATAPRES) 0.3 MG tablet Take 1 tablet (0.3 mg total) by mouth 3 (three) times daily. 270 tablet 1    furosemide (LASIX)  "80 MG tablet Take 1 tablet (80 mg total) by mouth 3 (three) times daily with meals. (Patient taking differently: Take 80 mg by mouth 3 (three) times daily.) 90 tablet 11    hydrALAZINE (APRESOLINE) 100 MG tablet Take 1 tablet (100 mg total) by mouth 3 (three) times daily. 90 tablet 11    isosorbide mononitrate (IMDUR) 120 MG 24 hr tablet Take 1 tablet (120 mg total) by mouth once daily. 30 tablet 11    metoprolol succinate (TOPROL-XL) 50 MG 24 hr tablet Take 3 tablets (150 mg total) by mouth once daily. 90 tablet 11    olmesartan (BENICAR) 40 MG tablet Take 1 tablet (40 mg total) by mouth once daily. 90 tablet 1    sevelamer carbonate (RENVELA) 800 mg Tab Take 2 tablets (1,600 mg total) by mouth 3 (three) times daily with meals. 180 tablet 11     Scheduled Meds:   albumin human 25%  25 g Intravenous Once    allopurinoL  50 mg Oral Every Mon, Fri    amLODIPine  10 mg Oral Daily    calcitRIOL  0.25 mcg Oral Daily    calcium carbonate  1,000 mg Oral TID    cloNIDine  0.3 mg Oral TID    epoetin mily  50 Units/kg Intravenous Every Mon, Wed, Fri    furosemide  80 mg Oral TID AC    heparin (porcine)  5,000 Units Subcutaneous Q8H    hydrALAZINE  100 mg Oral TID    isosorbide mononitrate  120 mg Oral Daily    losartan  100 mg Oral Daily    metoprolol succinate  150 mg Oral Daily    sevelamer carbonate  1,600 mg Oral TID WM     Continuous Infusions:  PRN Meds:.acetaminophen, diphenhydrAMINE, hydrALAZINE, LORazepam, morphine, naloxone, ondansetron, oxyCODONE-acetaminophen, prochlorperazine, sodium chloride 0.9%    Review of Systems:  Neg    Physical Exam:    /88   Pulse 81   Temp 97.7 °F (36.5 °C) (Axillary)   Resp 15   Ht 6' 2" (1.88 m)   Wt 128.8 kg (283 lb 15.2 oz)   SpO2 100%   BMI 36.46 kg/m²     General Appearance:    Alert, cooperative, no distress, appears stated age   Head:    Normocephalic, without obvious abnormality, atraumatic   Eyes:    PER, conjunctiva/corneas clear, EOM's intact in both eyes      "   Throat:   Lips, mucosa, and tongue normal; teeth and gums normal   Back:     Symmetric, no curvature, ROM normal, no CVA tenderness   Lungs:     Clear to auscultation bilaterally, respirations unlabored   Chest wall:    No tenderness or deformity   Heart:    Regular rate and rhythm, S1 and S2 normal, no murmur, rub   or gallop   Abdomen:     Soft, non-tender, bowel sounds active all four quadrants,     no masses, no organomegaly   Extremities:   Extremities normal, atraumatic, no cyanosis or edema   Pulses:   2+ and symmetric all extremities   MSK:   No joint or muscle swelling, tenderness or deformity   Skin:   Skin color, texture, turgor normal, no rashes or lesions   Neurologic:   CNII-XII intact, normal strength and sensation       Throughout.  No flap     Results:  Lab Results   Component Value Date     06/28/2023    K 4.8 06/28/2023    CL 99 06/28/2023    CO2 27 06/28/2023    BUN 37 (H) 06/28/2023    CREATININE 11.4 (H) 06/28/2023    CALCIUM 10.5 06/28/2023    ANIONGAP 13 06/28/2023       Lab Results   Component Value Date    CALCIUM 10.5 06/28/2023    PHOS 5.4 (H) 06/28/2023       Recent Labs   Lab 06/28/23  0315   WBC 7.69   RBC 3.97*   HGB 11.8*   HCT 37.7*      MCV 95   MCH 29.7   MCHC 31.3*          Imaging Results              X-Ray Chest PA And Lateral (Final result)  Result time 06/21/23 18:45:20   Procedure changed from X-Ray Chest AP Portable     Final result by Clark Santana MD (06/21/23 18:45:20)                   Narrative:    EXAM DESCRIPTION: XR CHEST PA AND LATERAL    CLINICAL HISTORY: 42 years Male, Acute kidney injury    COMPARISON: None.    FINDINGS: 2 views of the chest were obtained. The heart is at the upper limits of normal in size. The pulmonary vasculature is normal. No consolidations are seen, nor is there evidence of pleural fluid. No osseous lesions are seen.    IMPRESSION:  No acute cardiopulmonary disease is seen.    Electronically signed by:  Clark Santana MD   6/21/2023 6:45 PM CDT Workstation: [id number]                                      I have personally reviewed pertinent radiological imaging and reports.    Patient care was time spent personally by me on the following activities: > 35min  Obtaining a history  Examination of patient.  Providing medical care at the patients bedside.  Developing a treatment plan with patient or surrogate and bedside caregivers  Ordering and reviewing laboratory studies, radiographic studies, pulse oximetry.  Ordering and performing treatments and interventions.  Evaluation of patient's response to treatment.  Discussions with consultants while on the unit and immediately available to the patient.  Re-evaluation of the patient's condition.  Documentation in the medical record.     Oni Garcia MD    Nephrology  [facility]  [phone]

## 2023-06-28 NOTE — CARE UPDATE
Neetu at [facility] [phone] to inform her pt will start dialysis with them on Friday 6/30.  Pt notified of change.

## 2023-06-29 ENCOUNTER — TELEPHONE (OUTPATIENT)
Dept: PULMONOLOGY | Facility: CLINIC | Age: 43
End: 2023-06-29
Payer: COMMERCIAL

## 2023-06-29 ENCOUNTER — OFFICE VISIT (OUTPATIENT)
Dept: PRIMARY CARE CLINIC | Facility: CLINIC | Age: 43
End: 2023-06-29
Payer: COMMERCIAL

## 2023-06-29 VITALS
HEIGHT: 74 IN | WEIGHT: 279.56 LBS | SYSTOLIC BLOOD PRESSURE: 140 MMHG | OXYGEN SATURATION: 96 % | BODY MASS INDEX: 35.88 KG/M2 | HEART RATE: 104 BPM | DIASTOLIC BLOOD PRESSURE: 78 MMHG | TEMPERATURE: 99 F

## 2023-06-29 DIAGNOSIS — I13.0 HYPERTENSIVE HEART AND RENAL DISEASE WITH CONGESTIVE HEART FAILURE: ICD-10-CM

## 2023-06-29 DIAGNOSIS — I1A.0 RESISTANT HYPERTENSION: ICD-10-CM

## 2023-06-29 DIAGNOSIS — D63.8 ANEMIA OF CHRONIC DISEASE: ICD-10-CM

## 2023-06-29 DIAGNOSIS — Z09 HOSPITAL DISCHARGE FOLLOW-UP: ICD-10-CM

## 2023-06-29 DIAGNOSIS — R11.0 NAUSEA: ICD-10-CM

## 2023-06-29 DIAGNOSIS — I50.32 CHRONIC HEART FAILURE WITH PRESERVED EJECTION FRACTION: ICD-10-CM

## 2023-06-29 DIAGNOSIS — N18.6 ESRD (END STAGE RENAL DISEASE): Primary | ICD-10-CM

## 2023-06-29 PROCEDURE — 3008F PR BODY MASS INDEX (BMI) DOCUMENTED: ICD-10-PCS | Mod: CPTII,S$GLB,, | Performed by: STUDENT IN AN ORGANIZED HEALTH CARE EDUCATION/TRAINING PROGRAM

## 2023-06-29 PROCEDURE — 3008F BODY MASS INDEX DOCD: CPT | Mod: CPTII,S$GLB,, | Performed by: STUDENT IN AN ORGANIZED HEALTH CARE EDUCATION/TRAINING PROGRAM

## 2023-06-29 PROCEDURE — 3044F PR MOST RECENT HEMOGLOBIN A1C LEVEL <7.0%: ICD-10-PCS | Mod: CPTII,S$GLB,, | Performed by: STUDENT IN AN ORGANIZED HEALTH CARE EDUCATION/TRAINING PROGRAM

## 2023-06-29 PROCEDURE — 3077F SYST BP >= 140 MM HG: CPT | Mod: CPTII,S$GLB,, | Performed by: STUDENT IN AN ORGANIZED HEALTH CARE EDUCATION/TRAINING PROGRAM

## 2023-06-29 PROCEDURE — 99496 TRANSJ CARE MGMT HIGH F2F 7D: CPT | Mod: S$GLB,,, | Performed by: STUDENT IN AN ORGANIZED HEALTH CARE EDUCATION/TRAINING PROGRAM

## 2023-06-29 PROCEDURE — 1160F PR REVIEW ALL MEDS BY PRESCRIBER/CLIN PHARMACIST DOCUMENTED: ICD-10-PCS | Mod: CPTII,S$GLB,, | Performed by: STUDENT IN AN ORGANIZED HEALTH CARE EDUCATION/TRAINING PROGRAM

## 2023-06-29 PROCEDURE — 99999 PR PBB SHADOW E&M-EST. PATIENT-LVL V: CPT | Mod: PBBFAC,,,

## 2023-06-29 PROCEDURE — 1111F PR DISCHARGE MEDS RECONCILED W/ CURRENT OUTPATIENT MED LIST: ICD-10-PCS | Mod: CPTII,S$GLB,, | Performed by: STUDENT IN AN ORGANIZED HEALTH CARE EDUCATION/TRAINING PROGRAM

## 2023-06-29 PROCEDURE — 3066F PR DOCUMENTATION OF TREATMENT FOR NEPHROPATHY: ICD-10-PCS | Mod: CPTII,S$GLB,, | Performed by: STUDENT IN AN ORGANIZED HEALTH CARE EDUCATION/TRAINING PROGRAM

## 2023-06-29 PROCEDURE — 3078F PR MOST RECENT DIASTOLIC BLOOD PRESSURE < 80 MM HG: ICD-10-PCS | Mod: CPTII,S$GLB,, | Performed by: STUDENT IN AN ORGANIZED HEALTH CARE EDUCATION/TRAINING PROGRAM

## 2023-06-29 PROCEDURE — 1111F DSCHRG MED/CURRENT MED MERGE: CPT | Mod: CPTII,S$GLB,, | Performed by: STUDENT IN AN ORGANIZED HEALTH CARE EDUCATION/TRAINING PROGRAM

## 2023-06-29 PROCEDURE — 99496 TRANSITIONAL CARE MANAGE SERVICE 7 DAY DISCHARGE: ICD-10-PCS | Mod: S$GLB,,, | Performed by: STUDENT IN AN ORGANIZED HEALTH CARE EDUCATION/TRAINING PROGRAM

## 2023-06-29 PROCEDURE — 3066F NEPHROPATHY DOC TX: CPT | Mod: CPTII,S$GLB,, | Performed by: STUDENT IN AN ORGANIZED HEALTH CARE EDUCATION/TRAINING PROGRAM

## 2023-06-29 PROCEDURE — 3044F HG A1C LEVEL LT 7.0%: CPT | Mod: CPTII,S$GLB,, | Performed by: STUDENT IN AN ORGANIZED HEALTH CARE EDUCATION/TRAINING PROGRAM

## 2023-06-29 PROCEDURE — 4010F ACE/ARB THERAPY RXD/TAKEN: CPT | Mod: CPTII,S$GLB,, | Performed by: STUDENT IN AN ORGANIZED HEALTH CARE EDUCATION/TRAINING PROGRAM

## 2023-06-29 PROCEDURE — 3078F DIAST BP <80 MM HG: CPT | Mod: CPTII,S$GLB,, | Performed by: STUDENT IN AN ORGANIZED HEALTH CARE EDUCATION/TRAINING PROGRAM

## 2023-06-29 PROCEDURE — 4010F PR ACE/ARB THEARPY RXD/TAKEN: ICD-10-PCS | Mod: CPTII,S$GLB,, | Performed by: STUDENT IN AN ORGANIZED HEALTH CARE EDUCATION/TRAINING PROGRAM

## 2023-06-29 PROCEDURE — 3077F PR MOST RECENT SYSTOLIC BLOOD PRESSURE >= 140 MM HG: ICD-10-PCS | Mod: CPTII,S$GLB,, | Performed by: STUDENT IN AN ORGANIZED HEALTH CARE EDUCATION/TRAINING PROGRAM

## 2023-06-29 PROCEDURE — 1159F PR MEDICATION LIST DOCUMENTED IN MEDICAL RECORD: ICD-10-PCS | Mod: CPTII,S$GLB,, | Performed by: STUDENT IN AN ORGANIZED HEALTH CARE EDUCATION/TRAINING PROGRAM

## 2023-06-29 PROCEDURE — 1159F MED LIST DOCD IN RCRD: CPT | Mod: CPTII,S$GLB,, | Performed by: STUDENT IN AN ORGANIZED HEALTH CARE EDUCATION/TRAINING PROGRAM

## 2023-06-29 PROCEDURE — 99999 PR PBB SHADOW E&M-EST. PATIENT-LVL V: ICD-10-PCS | Mod: PBBFAC,,,

## 2023-06-29 PROCEDURE — 1160F RVW MEDS BY RX/DR IN RCRD: CPT | Mod: CPTII,S$GLB,, | Performed by: STUDENT IN AN ORGANIZED HEALTH CARE EDUCATION/TRAINING PROGRAM

## 2023-06-29 RX ORDER — CLONIDINE HYDROCHLORIDE 0.3 MG/1
0.3 TABLET ORAL 3 TIMES DAILY PRN
Qty: 90 TABLET | Refills: 0
Start: 2023-06-29 | End: 2023-07-29

## 2023-06-29 RX ORDER — ONDANSETRON 4 MG/1
4 TABLET, ORALLY DISINTEGRATING ORAL EVERY 8 HOURS PRN
Qty: 15 TABLET | Refills: 0 | Status: SHIPPED | OUTPATIENT
Start: 2023-06-29

## 2023-06-29 NOTE — PLAN OF CARE
Problem: Adult Inpatient Plan of Care  Goal: Plan of Care Review  Outcome: Met  Goal: Patient-Specific Goal (Individualized)  Outcome: Met  Goal: Absence of Hospital-Acquired Illness or Injury  Outcome: Met  Goal: Optimal Comfort and Wellbeing  Outcome: Met  Goal: Readiness for Transition of Care  Outcome: Met     Problem: Device-Related Complication Risk (Hemodialysis)  Goal: Safe, Effective Therapy Delivery  Outcome: Met     Problem: Hemodynamic Instability (Hemodialysis)  Goal: Effective Tissue Perfusion  Outcome: Met     Problem: Infection (Hemodialysis)  Goal: Absence of Infection Signs and Symptoms  Outcome: Met     Problem: Infection  Goal: Absence of Infection Signs and Symptoms  Outcome: Met     Problem: Fall Injury Risk  Goal: Absence of Fall and Fall-Related Injury  Outcome: Met     Problem: Pain Acute  Goal: Acceptable Pain Control and Functional Ability  Outcome: Met

## 2023-06-29 NOTE — PROGRESS NOTES
06/28/23 2100   Required for all Hemodialysis Patients   Hepatitis Status negative   Handoff Report   Received From Lake BAPTISTE RN   Given To Belen JENNINGS RN   Treatment Type   Treatment Type Maintenance   Vital Signs   Temp 98.6 °F (37 °C)   Temp Source Oral   Resp 18   SpO2 99 %   Flow (L/min) 2   Device (Oxygen Therapy) nasal cannula   BP Method Automatic   Patient Position Lying   Assessments (Pre/Post)   Consent Obtained yes   Date Hepatitis Profile Obtained 06/28/23   Blood Liters Processed (BLP) 48.6   Transport Modality bed   Level of Consciousness (AVPU) alert   Dialyzer Clearance moderately streaked   Pain   Pain Rating (0-10): Rest 0        Hemodialysis Catheter 06/22/23 1809 right internal jugular   Placement Date/Time: 06/22/23 1809   Present Prior to Hospital Arrival?: No  Hand Hygiene: Performed  Barrier Precautions: Performed  Skin Antisepsis: ChloraPrep  Hemodialysis Catheter Type: Tunneled catheter  Location: right internal jugular  Cathete...   Site Assessment No drainage;No redness;No swelling;No warmth   Line Securement Device Secured with sutures   Dressing Type Central line dressing   Dressing Status Clean;Dry;Intact   Dressing Intervention Integrity maintained   Date on Dressing 06/23/23   Dressing Due to be Changed 06/30/23   Venous Patency/Care heparin locked;flushed w/o difficulty   Arterial Patency/Care flushed w/o difficulty;heparin locked   Post-Hemodialysis Assessment   Rinseback Volume (mL) 250 mL   Blood Volume Processed (Liters) 48.6 L   Dialyzer Clearance Moderately streaked   Duration of Treatment 180 minutes   Additional Fluid Intake (mL) 500 mL   Total UF (mL) 500 mL   Net Fluid Removal 0   Patient Response to Treatment pt miya tx   Post-Treatment Weight 129 kg (284 lb 6.3 oz)   Treatment Weight Change 0   Post-Hemodialysis Comments pt stable   Edema   Edema   (none)     Pt had low b/p throughout tx, 0 net u/f

## 2023-06-29 NOTE — PROGRESS NOTES
"Transitional Care Note  Subjective:       Patient ID: João Ham is a 42 y.o. male.  Chief Complaint: Transitional Care    Family and/or Caretaker present at visit?  No.  Diagnostic tests reviewed/disposition: I have reviewed all completed as well as pending diagnostic tests at the time of discharge.  Disease/illness education: Yes we discussed his CKD/ESRD and new use of dialysis.  Home health/community services discussion/referrals: Patient does not have home health established from hospital visit.  They do not need home health.  If needed, we will set up home health for the patient.   Establishment or re-establishment of referral orders for community resources: No other necessary community resources.   Discussion with other health care providers: No discussion with other health care providers necessary.     42M with PMH HTN, HFpEF, ESRD, and anemia of chronic disease presents for hospital discharge follow up. He was hospitalized at [facility] from 6/21-6/28 due to request of his nephrologist because of elevating BUN/creatinine and ultimately was started on hemodialysis with multiple sessions done during hospitalization. RIJ tunneled dialysis catheter placed during his stay still in place. His other labs were generally unremarkable. Per documentation, he was asymptomatic during his hospital stay. Today he reports some general fatigue and nausea. The only medications he took today were hydralazine, clonidine, tums, and sevelamer all taken about 2 hours prior to arrival and his BP here is 140/78 with pulse . He states his last dialysis session in the hospital they were unable "to pull off more fluid." He has a blood pressure cuff at home but did not check today.    Review of Systems   Constitutional:  Positive for fatigue. Negative for chills and fever.   Respiratory:  Negative for shortness of breath.    Cardiovascular:  Negative for chest pain.   Gastrointestinal:  Positive for abdominal " pain and nausea. Negative for abdominal distention, constipation, diarrhea and vomiting.   Genitourinary:  Negative for difficulty urinating.   Neurological:  Negative for dizziness and light-headedness.     Objective:      Physical Exam  Vitals reviewed.   Constitutional:       General: He is not in acute distress.     Appearance: He is not ill-appearing.   HENT:      Head: Normocephalic and atraumatic.   Neck:      Comments: RIJ tunnel cath well-appearing  Cardiovascular:      Rate and Rhythm: Regular rhythm. Tachycardia present.      Heart sounds: Normal heart sounds. No murmur heard.  Pulmonary:      Effort: Pulmonary effort is normal. No respiratory distress.      Breath sounds: Normal breath sounds. No wheezing, rhonchi or rales.   Abdominal:      General: Abdomen is flat. Bowel sounds are normal. There is no distension.      Palpations: Abdomen is soft.      Tenderness: There is no abdominal tenderness. There is no guarding or rebound.   Musculoskeletal:      Right lower leg: No edema.      Left lower leg: No edema.   Skin:     General: Skin is warm and dry.      Capillary Refill: Capillary refill takes 2 to 3 seconds.   Neurological:      General: No focal deficit present.      Mental Status: He is alert and oriented to person, place, and time. Mental status is at baseline.   Psychiatric:         Mood and Affect: Affect normal.         Behavior: Behavior normal.       Assessment:       1. ESRD (end stage renal disease)    2. Chronic heart failure with preserved ejection fraction    3. Resistant hypertension    4. Anemia of chronic disease    5. Hospital discharge follow-up    6. Hypertensive heart and renal disease with congestive heart failure    7. Nausea        Plan:       1. ESRD (end stage renal disease)  Stable per labs yesterday. I suspect he is volume down at the moment.  - continue with HD MWF to start tomorrow    2. Chronic heart failure with preserved ejection fraction  Chronic. Stable.  -  encouraged adequate hydration and ok to continue lasix    3. Resistant hypertension  I believe he is going to need decreased amount of antihypertensives given his reading today without taking half of his antihypertensives and the fact that he has newly begun dialysis.  - discontinue amlodipine  - change clonidine to prn for SBP >170  - continue losartan, hydralazine, lasix, metoprolol  - check BP at least day, night, and prn. Bring log next visit.    4. Anemia of chronic disease  Stable per labs. Actually higher than baseline which is more evidence he is likely a little volume down.    5. Hospital discharge follow-up  - complete hospital course reviewed including all notes, studies, and medications.    6. Hypertensive heart and renal disease with congestive heart failure  - see above    7. Nausea  Likely side effect of dialysis  Abd is nondistended, nontender, with normal bowel sounds  - ondansetron (ZOFRAN-ODT) 4 MG TbDL; Take 1 tablet (4 mg total) by mouth every 8 (eight) hours as needed (nausea).  Dispense: 15 tablet; Refill: 0         Keep scheduled hemodialysis 6/30/23.  RTC 7/5/23 to review BP log and symptoms    Bennie Varghese MD  [facility]

## 2023-07-06 ENCOUNTER — TELEPHONE (OUTPATIENT)
Dept: PRIMARY CARE CLINIC | Facility: CLINIC | Age: 43
End: 2023-07-06
Payer: COMMERCIAL

## 2023-07-06 NOTE — TELEPHONE ENCOUNTER
----- Message from Carole Flores sent at 7/5/2023 10:36 AM CDT -----  Regarding: Needs return call  Type: Needs Medical Advice  Who Called:  João Urbina Call Back Number: [phone]    Additional Information: Needs to be called so he can rescheule appointment today,  he has dyalisis at the sametime

## 2023-07-10 ENCOUNTER — OFFICE VISIT (OUTPATIENT)
Dept: PRIMARY CARE CLINIC | Facility: CLINIC | Age: 43
End: 2023-07-10
Payer: COMMERCIAL

## 2023-07-10 VITALS
SYSTOLIC BLOOD PRESSURE: 154 MMHG | WEIGHT: 287.38 LBS | HEART RATE: 67 BPM | OXYGEN SATURATION: 96 % | TEMPERATURE: 98 F | DIASTOLIC BLOOD PRESSURE: 100 MMHG | BODY MASS INDEX: 36.88 KG/M2 | HEIGHT: 74 IN

## 2023-07-10 DIAGNOSIS — N18.6 ESRD (END STAGE RENAL DISEASE): ICD-10-CM

## 2023-07-10 DIAGNOSIS — I10 HYPERTENSION, UNSPECIFIED TYPE: Primary | ICD-10-CM

## 2023-07-10 DIAGNOSIS — Z09 HOSPITAL DISCHARGE FOLLOW-UP: ICD-10-CM

## 2023-07-10 PROCEDURE — 99214 OFFICE O/P EST MOD 30 MIN: CPT | Mod: S$GLB,,, | Performed by: NURSE PRACTITIONER

## 2023-07-10 PROCEDURE — 99214 PR OFFICE/OUTPT VISIT, EST, LEVL IV, 30-39 MIN: ICD-10-PCS | Mod: S$GLB,,, | Performed by: NURSE PRACTITIONER

## 2023-07-10 PROCEDURE — 4010F PR ACE/ARB THEARPY RXD/TAKEN: ICD-10-PCS | Mod: CPTII,S$GLB,, | Performed by: NURSE PRACTITIONER

## 2023-07-10 PROCEDURE — 3008F PR BODY MASS INDEX (BMI) DOCUMENTED: ICD-10-PCS | Mod: CPTII,S$GLB,, | Performed by: NURSE PRACTITIONER

## 2023-07-10 PROCEDURE — 99999 PR PBB SHADOW E&M-EST. PATIENT-LVL V: CPT | Mod: PBBFAC,,, | Performed by: NURSE PRACTITIONER

## 2023-07-10 PROCEDURE — 1159F MED LIST DOCD IN RCRD: CPT | Mod: CPTII,S$GLB,, | Performed by: NURSE PRACTITIONER

## 2023-07-10 PROCEDURE — 3080F DIAST BP >= 90 MM HG: CPT | Mod: CPTII,S$GLB,, | Performed by: NURSE PRACTITIONER

## 2023-07-10 PROCEDURE — 3077F SYST BP >= 140 MM HG: CPT | Mod: CPTII,S$GLB,, | Performed by: NURSE PRACTITIONER

## 2023-07-10 PROCEDURE — 3080F PR MOST RECENT DIASTOLIC BLOOD PRESSURE >= 90 MM HG: ICD-10-PCS | Mod: CPTII,S$GLB,, | Performed by: NURSE PRACTITIONER

## 2023-07-10 PROCEDURE — 1160F RVW MEDS BY RX/DR IN RCRD: CPT | Mod: CPTII,S$GLB,, | Performed by: NURSE PRACTITIONER

## 2023-07-10 PROCEDURE — 3008F BODY MASS INDEX DOCD: CPT | Mod: CPTII,S$GLB,, | Performed by: NURSE PRACTITIONER

## 2023-07-10 PROCEDURE — 1111F PR DISCHARGE MEDS RECONCILED W/ CURRENT OUTPATIENT MED LIST: ICD-10-PCS | Mod: CPTII,S$GLB,, | Performed by: NURSE PRACTITIONER

## 2023-07-10 PROCEDURE — 3044F HG A1C LEVEL LT 7.0%: CPT | Mod: CPTII,S$GLB,, | Performed by: NURSE PRACTITIONER

## 2023-07-10 PROCEDURE — 3066F PR DOCUMENTATION OF TREATMENT FOR NEPHROPATHY: ICD-10-PCS | Mod: CPTII,S$GLB,, | Performed by: NURSE PRACTITIONER

## 2023-07-10 PROCEDURE — 1159F PR MEDICATION LIST DOCUMENTED IN MEDICAL RECORD: ICD-10-PCS | Mod: CPTII,S$GLB,, | Performed by: NURSE PRACTITIONER

## 2023-07-10 PROCEDURE — 99999 PR PBB SHADOW E&M-EST. PATIENT-LVL V: ICD-10-PCS | Mod: PBBFAC,,, | Performed by: NURSE PRACTITIONER

## 2023-07-10 PROCEDURE — 3044F PR MOST RECENT HEMOGLOBIN A1C LEVEL <7.0%: ICD-10-PCS | Mod: CPTII,S$GLB,, | Performed by: NURSE PRACTITIONER

## 2023-07-10 PROCEDURE — 1160F PR REVIEW ALL MEDS BY PRESCRIBER/CLIN PHARMACIST DOCUMENTED: ICD-10-PCS | Mod: CPTII,S$GLB,, | Performed by: NURSE PRACTITIONER

## 2023-07-10 PROCEDURE — 1111F DSCHRG MED/CURRENT MED MERGE: CPT | Mod: CPTII,S$GLB,, | Performed by: NURSE PRACTITIONER

## 2023-07-10 PROCEDURE — 4010F ACE/ARB THERAPY RXD/TAKEN: CPT | Mod: CPTII,S$GLB,, | Performed by: NURSE PRACTITIONER

## 2023-07-10 PROCEDURE — 3066F NEPHROPATHY DOC TX: CPT | Mod: CPTII,S$GLB,, | Performed by: NURSE PRACTITIONER

## 2023-07-10 PROCEDURE — 3077F PR MOST RECENT SYSTOLIC BLOOD PRESSURE >= 140 MM HG: ICD-10-PCS | Mod: CPTII,S$GLB,, | Performed by: NURSE PRACTITIONER

## 2023-07-10 NOTE — PROGRESS NOTES
"Transitional Care Note  Subjective:       Patient ID: João Ham is a 42 y.o. male.  Chief Complaint: Hospital Follow Up    Family and/or Caretaker present at visit?  No.  Diagnostic tests reviewed/disposition: No diagnosic tests pending after this hospitalization.  Disease/illness education: Blood pressure log and how to take a blood pressure  Home health/community services discussion/referrals: Patient does not have home health established from hospital visit.  They do not need home health.  If needed, we will set up home health for the patient.   Establishment or re-establishment of referral orders for community resources: No other necessary community resources.   Discussion with other health care providers: No discussion with other health care providers necessary.   Mr. Ham is a 42 year old male with history of HTN, HFpEF 60%, ESRD newly started on HD, and anemia of chronic disease presents for second hospital discharge follow up. He was hospitalized at [facility] from 6/21-6/28 due to request of his nephrologist because of elevating BUN/creatinine and HD was initiated via RIJ tunneled cath. He was seen initially for follow up on 6/29 and his blood pressure were dropping at home since starting HD. Recommended he stop amlodipine and only take clonidine for SBP >170. He was to continue losartan, hydralazine, lasix, and metoprolol. He was requested to come back with a blood pressure log, but did not bring this. He states his blood pressures have been running anywhere between 130/90 and 150/100. He does not know what it's been running during dialysis stating sometimes it runs too low to read and sometimes it's too high. He also does not know if he has been taking both olmesartan and losartan or just one. He ran out of olmesartan 2 days ago. He has continued hydralazine, lasix, and metoprolol and has required the clonidine "a couple of times". He states some days after HD his BP is low and he does " not take his night time meds. States weight has been consistent with a variance of about 5lbs between HD.     Review of Systems   Constitutional:  Positive for fatigue. Negative for chills, diaphoresis and fever.   HENT:  Negative for congestion, ear pain, sore throat and trouble swallowing.    Eyes:  Negative for pain, discharge and visual disturbance.   Respiratory:  Negative for cough, chest tightness, shortness of breath and wheezing.    Cardiovascular:  Negative for chest pain, palpitations and leg swelling.   Gastrointestinal:  Negative for abdominal distention, abdominal pain, blood in stool, constipation, diarrhea, nausea and vomiting.   Endocrine: Negative for polydipsia, polyphagia and polyuria.   Genitourinary:  Negative for dysuria, flank pain, frequency and urgency.   Musculoskeletal:  Negative for back pain, joint swelling, neck pain and neck stiffness.   Skin:  Negative for rash and wound.   Allergic/Immunologic: Negative for immunocompromised state.   Neurological:  Negative for dizziness, syncope, speech difficulty, weakness, light-headedness, numbness and headaches.   Hematological:  Negative for adenopathy.   Psychiatric/Behavioral:  Negative for confusion and suicidal ideas. The patient is not nervous/anxious.    All other systems reviewed and are negative.    Objective:      Physical Exam  Vitals and nursing note reviewed.   Constitutional:       Appearance: He is well-developed. He is obese.   HENT:      Head: Normocephalic and atraumatic.   Eyes:      Conjunctiva/sclera: Conjunctivae normal.      Pupils: Pupils are equal, round, and reactive to light.   Cardiovascular:      Rate and Rhythm: Normal rate and regular rhythm.      Heart sounds: Murmur heard.   Pulmonary:      Effort: Pulmonary effort is normal.      Breath sounds: Normal breath sounds.   Abdominal:      General: Bowel sounds are normal.      Palpations: Abdomen is soft.   Musculoskeletal:         General: Normal range of motion.       Cervical back: Normal range of motion and neck supple.   Skin:     General: Skin is warm and dry.      Capillary Refill: Capillary refill takes less than 2 seconds.   Neurological:      Mental Status: He is alert and oriented to person, place, and time.   Psychiatric:         Behavior: Behavior normal.         Thought Content: Thought content normal.         Judgment: Judgment normal.       Assessment:       1. Hypertension, unspecified type    2. ESRD (end stage renal disease)    3. Hospital discharge follow-up        Plan:       He has an appt with Dr. Granado on 7/13.   I have requested he keep a log of his BP and clarified he write this down on the paper provided or put it in the notes michael in his phone.   He should bring all his meds to his appt with Dr. Granado, specifically to make sure he is only taking one ARB  Continue Losartan, hydralazine, lasix, and metoprolol. He should hold his antihypertensives if his BP is below 120/80  May continue to to take clonidine PRN for SBP >170  Explained that HD will continue to have a significant effect on his blood pressure and his medications will likely have to be adjusted as he proceeds. Educated that keeping a blood pressure log will help his medical team adjust his medications going forward. He verbalizes understanding.

## 2023-07-10 NOTE — PATIENT INSTRUCTIONS
Please keep a log of your blood pressures below for Dr. Granado. You may also keep it on your phone on the notes michael, or on a blood pressure log provided.    If the top number is LESS THAN 120 DO NOT TAKE YOUR BLOOD PRESSURE MEDS  If the bottom number is LESS THAN 80 DO NOT TAKE YOUR BLOOD PRESSURE MEDS (UNLESS THE TOP NUMBER IS ABOVE 170)     Bring your meds to your appt with Dr. Granado. Verify if you are taking olmesartan vs. Losartan. You should only be taking one of these.       7/10/23 Pre HD 0915 - 154/100 (took meds immediately prior to arrival)  7/10/23 Post HD ______ (time: _____)    7/11/23 AM _____________    7/11/23 PM ___________    7/12/23 PRE HD AM ____________    7/12/23 Post HD PM ____________    7/13/23 AM _____________

## 2023-07-14 ENCOUNTER — PATIENT MESSAGE (OUTPATIENT)
Dept: FAMILY MEDICINE | Facility: CLINIC | Age: 43
End: 2023-07-14

## 2023-07-14 ENCOUNTER — OFFICE VISIT (OUTPATIENT)
Dept: FAMILY MEDICINE | Facility: CLINIC | Age: 43
End: 2023-07-14
Payer: COMMERCIAL

## 2023-07-14 VITALS
TEMPERATURE: 99 F | HEIGHT: 74 IN | HEART RATE: 67 BPM | DIASTOLIC BLOOD PRESSURE: 110 MMHG | SYSTOLIC BLOOD PRESSURE: 160 MMHG | BODY MASS INDEX: 36.1 KG/M2 | WEIGHT: 281.31 LBS | OXYGEN SATURATION: 98 %

## 2023-07-14 DIAGNOSIS — I13.0 HYPERTENSIVE HEART AND RENAL DISEASE WITH CONGESTIVE HEART FAILURE: ICD-10-CM

## 2023-07-14 DIAGNOSIS — Z02.89 ENCOUNTER FOR COMPLETION OF FORM WITH PATIENT: Primary | ICD-10-CM

## 2023-07-14 DIAGNOSIS — N18.6 ESRD (END STAGE RENAL DISEASE): ICD-10-CM

## 2023-07-14 PROCEDURE — 99213 OFFICE O/P EST LOW 20 MIN: CPT | Mod: S$GLB,,, | Performed by: NURSE PRACTITIONER

## 2023-07-14 PROCEDURE — 1111F DSCHRG MED/CURRENT MED MERGE: CPT | Mod: CPTII,S$GLB,, | Performed by: NURSE PRACTITIONER

## 2023-07-14 PROCEDURE — 3008F BODY MASS INDEX DOCD: CPT | Mod: CPTII,S$GLB,, | Performed by: NURSE PRACTITIONER

## 2023-07-14 PROCEDURE — 3080F PR MOST RECENT DIASTOLIC BLOOD PRESSURE >= 90 MM HG: ICD-10-PCS | Mod: CPTII,S$GLB,, | Performed by: NURSE PRACTITIONER

## 2023-07-14 PROCEDURE — 1160F PR REVIEW ALL MEDS BY PRESCRIBER/CLIN PHARMACIST DOCUMENTED: ICD-10-PCS | Mod: CPTII,S$GLB,, | Performed by: NURSE PRACTITIONER

## 2023-07-14 PROCEDURE — 3044F HG A1C LEVEL LT 7.0%: CPT | Mod: CPTII,S$GLB,, | Performed by: NURSE PRACTITIONER

## 2023-07-14 PROCEDURE — 4010F ACE/ARB THERAPY RXD/TAKEN: CPT | Mod: CPTII,S$GLB,, | Performed by: NURSE PRACTITIONER

## 2023-07-14 PROCEDURE — 99999 PR PBB SHADOW E&M-EST. PATIENT-LVL IV: CPT | Mod: PBBFAC,,, | Performed by: NURSE PRACTITIONER

## 2023-07-14 PROCEDURE — 4010F PR ACE/ARB THEARPY RXD/TAKEN: ICD-10-PCS | Mod: CPTII,S$GLB,, | Performed by: NURSE PRACTITIONER

## 2023-07-14 PROCEDURE — 99999 PR PBB SHADOW E&M-EST. PATIENT-LVL IV: ICD-10-PCS | Mod: PBBFAC,,, | Performed by: NURSE PRACTITIONER

## 2023-07-14 PROCEDURE — 1159F PR MEDICATION LIST DOCUMENTED IN MEDICAL RECORD: ICD-10-PCS | Mod: CPTII,S$GLB,, | Performed by: NURSE PRACTITIONER

## 2023-07-14 PROCEDURE — 3008F PR BODY MASS INDEX (BMI) DOCUMENTED: ICD-10-PCS | Mod: CPTII,S$GLB,, | Performed by: NURSE PRACTITIONER

## 2023-07-14 PROCEDURE — 99213 PR OFFICE/OUTPT VISIT, EST, LEVL III, 20-29 MIN: ICD-10-PCS | Mod: S$GLB,,, | Performed by: NURSE PRACTITIONER

## 2023-07-14 PROCEDURE — 3080F DIAST BP >= 90 MM HG: CPT | Mod: CPTII,S$GLB,, | Performed by: NURSE PRACTITIONER

## 2023-07-14 PROCEDURE — 1111F PR DISCHARGE MEDS RECONCILED W/ CURRENT OUTPATIENT MED LIST: ICD-10-PCS | Mod: CPTII,S$GLB,, | Performed by: NURSE PRACTITIONER

## 2023-07-14 PROCEDURE — 3077F SYST BP >= 140 MM HG: CPT | Mod: CPTII,S$GLB,, | Performed by: NURSE PRACTITIONER

## 2023-07-14 PROCEDURE — 1159F MED LIST DOCD IN RCRD: CPT | Mod: CPTII,S$GLB,, | Performed by: NURSE PRACTITIONER

## 2023-07-14 PROCEDURE — 1160F RVW MEDS BY RX/DR IN RCRD: CPT | Mod: CPTII,S$GLB,, | Performed by: NURSE PRACTITIONER

## 2023-07-14 PROCEDURE — 3066F PR DOCUMENTATION OF TREATMENT FOR NEPHROPATHY: ICD-10-PCS | Mod: CPTII,S$GLB,, | Performed by: NURSE PRACTITIONER

## 2023-07-14 PROCEDURE — 3077F PR MOST RECENT SYSTOLIC BLOOD PRESSURE >= 140 MM HG: ICD-10-PCS | Mod: CPTII,S$GLB,, | Performed by: NURSE PRACTITIONER

## 2023-07-14 PROCEDURE — 3066F NEPHROPATHY DOC TX: CPT | Mod: CPTII,S$GLB,, | Performed by: NURSE PRACTITIONER

## 2023-07-14 PROCEDURE — 3044F PR MOST RECENT HEMOGLOBIN A1C LEVEL <7.0%: ICD-10-PCS | Mod: CPTII,S$GLB,, | Performed by: NURSE PRACTITIONER

## 2023-07-14 NOTE — PROGRESS NOTES
Subjective:       Patient ID: João Ham is a 43 y.o. male.    Chief Complaint: long term disability     HPI   42 y/o male patient with medical problems listed below presents for long term disability for ESRD on HD on MWF. Patient is followed by Dr. Hanna. Patient states missed appt with Dr. Granado yesterday on 7/13. He states he is on waiting list of kidney transplant. 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   Patient recently hospitalized on 6/21 to initiate HD. Bun/Cr 81/11.9 in ED. Vascular placed dialysis cath on 6/22 and underwent HD.    Labs reviewed from 6/2023    Patient Active Problem List   Diagnosis    ESRD (end stage renal disease)    Anemia of chronic disease    Persistent proteinuria    Elevated troponin    Severe obesity with body mass index (BMI) of 35.0 to 39.9 with comorbidity    Pulmonary hypertension    Hypertensive heart and renal disease with congestive heart failure    Hyperparathyroidism    Erectile dysfunction    Sleep apnea    Chronic heart failure with preserved ejection fraction    Resistant hypertension      Review of patient's allergies indicates:   Allergen Reactions    Mushroom Swelling     Tongue swells      Past Surgical History:   Procedure Laterality Date    HERNIA REPAIR Right     With mesh    INSERTION, CATHETER, TUNNELED N/A 6/22/2023    Procedure: Insertion,catheter,tunneled;  Surgeon: Everett Caicedo MD;  Location: [facility];  Service: General;  Laterality: N/A;        Current Outpatient Medications:     allopurinoL (ZYLOPRIM) 100 MG tablet, Take 0.5 tablets (50 mg total) by mouth every Mon, Fri., Disp: 4 tablet, Rfl: 0    calcitRIOL (ROCALTROL) 0.25 MCG Cap, Take 1 capsule by mouth once daily (Patient taking differently: Take 0.25 mcg  by mouth once daily.), Disp: 90 capsule, Rfl: 1    calcium carbonate (TUMS) 200 mg calcium (500 mg) chewable tablet, Take 2 tablets (1,000 mg total) by mouth 3 (three) times daily with meals. (Patient taking differently: Take 1,000 mg by mouth 3 (three) times daily.), Disp: 180 tablet, Rfl: 11    cloNIDine (CATAPRES) 0.3 MG tablet, Take 1 tablet (0.3 mg total) by mouth 3 (three) times daily as needed ( or higher)., Disp: 90 tablet, Rfl: 0    furosemide (LASIX) 80 MG tablet, Take 1 tablet (80 mg total) by mouth 2 (two) times daily., Disp: 60 tablet, Rfl: 0    hydrALAZINE (APRESOLINE) 100 MG tablet, Take 1 tablet (100 mg total) by mouth 3 (three) times daily., Disp: 90 tablet, Rfl: 11    isosorbide mononitrate (IMDUR) 120 MG 24 hr tablet, Take 1 tablet (120 mg total) by mouth once daily., Disp: 30 tablet, Rfl: 0    losartan (COZAAR) 100 MG tablet, Take 1 tablet (100 mg total) by mouth once daily., Disp: 30 tablet, Rfl: 0    metoprolol succinate (TOPROL-XL) 50 MG 24 hr tablet, Take 3 tablets (150 mg total) by mouth once daily., Disp: 90 tablet, Rfl: 0    ondansetron (ZOFRAN-ODT) 4 MG TbDL, Take 1 tablet (4 mg total) by mouth every 8 (eight) hours as needed (nausea)., Disp: 15 tablet, Rfl: 0    sevelamer carbonate (RENVELA) 800 mg Tab, Take 2 tablets (1,600 mg total) by mouth 3 (three) times daily with meals., Disp: 180 tablet, Rfl: 11    Lab Results   Component Value Date    WBC 7.69 06/28/2023    HGB 11.8 (L) 06/28/2023    HCT 37.7 (L) 06/28/2023     06/28/2023    CHOL 141 10/11/2022    TRIG 66 10/11/2022    HDL 39 (L) 10/11/2022    ALT 10 06/21/2023    AST 7 (L) 06/21/2023     06/28/2023    K 4.8 06/28/2023    CL 99 06/28/2023    CREATININE 11.4 (H) 06/28/2023    BUN 37 (H) 06/28/2023    CO2 27 06/28/2023    TSH 1.699 10/10/2022    PSA 0.36 01/10/2023    INR 1.0 02/02/2023    HGBA1C 5.5 06/22/2023     Review of Systems   Constitutional:  Negative for chills and fever.   Respiratory:  Negative for  "chest tightness and shortness of breath.    Cardiovascular:  Negative for chest pain and palpitations.   Gastrointestinal:  Negative for abdominal pain.   Neurological:  Negative for dizziness and headaches.       Objective:   BP (!) 160/110 (BP Location: Right arm, Patient Position: Sitting, BP Method: Large (Manual))   Pulse 67   Temp 98.5 °F (36.9 °C) (Oral)   Ht 6' 2" (1.88 m)   Wt 127.6 kg (281 lb 4.9 oz)   SpO2 98%   BMI 36.12 kg/m²         Physical Exam  Vitals reviewed.   Constitutional:       General: He is not in acute distress.     Appearance: Normal appearance.   Cardiovascular:      Rate and Rhythm: Normal rate and regular rhythm.      Pulses: Normal pulses.      Heart sounds: Normal heart sounds.   Pulmonary:      Effort: Pulmonary effort is normal.      Breath sounds: Normal breath sounds.   Chest:      Chest wall: No deformity, swelling or edema.   Musculoskeletal:      Cervical back: Normal range of motion.   Neurological:      General: No focal deficit present.      Mental Status: He is alert.   Psychiatric:         Mood and Affect: Mood normal.         Behavior: Behavior normal.       Assessment:       1. Encounter for completion of form with patient    2. ESRD (end stage renal disease)    3. Hypertensive heart and renal disease with congestive heart failure        Plan:       1. ESRD (end stage renal disease)  - Patient is on HD on MWF    2. Hypertensive heart and renal disease with congestive heart failure    3. Encounter for completion of form with patient  - Patient did not bring the form with him. Once receive it, will consult with PCP Dr. Granado    Patient with be reevaluated in  as scheduled with pcp  or sooner gurwinder Carroll NP    "

## 2023-07-17 ENCOUNTER — OFFICE VISIT (OUTPATIENT)
Dept: FAMILY MEDICINE | Facility: CLINIC | Age: 43
End: 2023-07-17
Payer: COMMERCIAL

## 2023-07-17 ENCOUNTER — LAB VISIT (OUTPATIENT)
Dept: LAB | Facility: HOSPITAL | Age: 43
End: 2023-07-17
Attending: NURSE PRACTITIONER
Payer: COMMERCIAL

## 2023-07-17 VITALS
DIASTOLIC BLOOD PRESSURE: 110 MMHG | HEART RATE: 103 BPM | BODY MASS INDEX: 36.06 KG/M2 | OXYGEN SATURATION: 96 % | HEIGHT: 74 IN | TEMPERATURE: 99 F | WEIGHT: 281 LBS | SYSTOLIC BLOOD PRESSURE: 146 MMHG | RESPIRATION RATE: 16 BRPM

## 2023-07-17 DIAGNOSIS — E66.01 SEVERE OBESITY WITH BODY MASS INDEX (BMI) OF 35.0 TO 39.9 WITH COMORBIDITY: ICD-10-CM

## 2023-07-17 DIAGNOSIS — I27.20 PULMONARY HYPERTENSION: ICD-10-CM

## 2023-07-17 DIAGNOSIS — I1A.0 RESISTANT HYPERTENSION: ICD-10-CM

## 2023-07-17 DIAGNOSIS — Z00.00 HEALTHCARE MAINTENANCE: Primary | ICD-10-CM

## 2023-07-17 DIAGNOSIS — M79.10 MYALGIA: ICD-10-CM

## 2023-07-17 DIAGNOSIS — N18.6 ESRD (END STAGE RENAL DISEASE): ICD-10-CM

## 2023-07-17 DIAGNOSIS — R11.0 NAUSEA: ICD-10-CM

## 2023-07-17 DIAGNOSIS — Z76.82 ORGAN TRANSPLANT CANDIDATE: ICD-10-CM

## 2023-07-17 DIAGNOSIS — R19.7 DIARRHEA, UNSPECIFIED TYPE: ICD-10-CM

## 2023-07-17 DIAGNOSIS — I13.0 HYPERTENSIVE HEART AND RENAL DISEASE WITH CONGESTIVE HEART FAILURE: ICD-10-CM

## 2023-07-17 PROCEDURE — 3008F BODY MASS INDEX DOCD: CPT | Mod: CPTII,S$GLB,, | Performed by: STUDENT IN AN ORGANIZED HEALTH CARE EDUCATION/TRAINING PROGRAM

## 2023-07-17 PROCEDURE — 1159F PR MEDICATION LIST DOCUMENTED IN MEDICAL RECORD: ICD-10-PCS | Mod: CPTII,S$GLB,, | Performed by: STUDENT IN AN ORGANIZED HEALTH CARE EDUCATION/TRAINING PROGRAM

## 2023-07-17 PROCEDURE — 99999 PR PBB SHADOW E&M-EST. PATIENT-LVL V: CPT | Mod: PBBFAC,,, | Performed by: STUDENT IN AN ORGANIZED HEALTH CARE EDUCATION/TRAINING PROGRAM

## 2023-07-17 PROCEDURE — 4010F PR ACE/ARB THEARPY RXD/TAKEN: ICD-10-PCS | Mod: CPTII,S$GLB,, | Performed by: STUDENT IN AN ORGANIZED HEALTH CARE EDUCATION/TRAINING PROGRAM

## 2023-07-17 PROCEDURE — 4010F ACE/ARB THERAPY RXD/TAKEN: CPT | Mod: CPTII,S$GLB,, | Performed by: STUDENT IN AN ORGANIZED HEALTH CARE EDUCATION/TRAINING PROGRAM

## 2023-07-17 PROCEDURE — 3080F PR MOST RECENT DIASTOLIC BLOOD PRESSURE >= 90 MM HG: ICD-10-PCS | Mod: CPTII,S$GLB,, | Performed by: STUDENT IN AN ORGANIZED HEALTH CARE EDUCATION/TRAINING PROGRAM

## 2023-07-17 PROCEDURE — 3066F PR DOCUMENTATION OF TREATMENT FOR NEPHROPATHY: ICD-10-PCS | Mod: CPTII,S$GLB,, | Performed by: STUDENT IN AN ORGANIZED HEALTH CARE EDUCATION/TRAINING PROGRAM

## 2023-07-17 PROCEDURE — 99001 SPECIMEN HANDLING PT-LAB: CPT | Mod: PO,TXP | Performed by: NURSE PRACTITIONER

## 2023-07-17 PROCEDURE — 3044F PR MOST RECENT HEMOGLOBIN A1C LEVEL <7.0%: ICD-10-PCS | Mod: CPTII,S$GLB,, | Performed by: STUDENT IN AN ORGANIZED HEALTH CARE EDUCATION/TRAINING PROGRAM

## 2023-07-17 PROCEDURE — 99214 OFFICE O/P EST MOD 30 MIN: CPT | Mod: S$GLB,,, | Performed by: STUDENT IN AN ORGANIZED HEALTH CARE EDUCATION/TRAINING PROGRAM

## 2023-07-17 PROCEDURE — 99214 PR OFFICE/OUTPT VISIT, EST, LEVL IV, 30-39 MIN: ICD-10-PCS | Mod: S$GLB,,, | Performed by: STUDENT IN AN ORGANIZED HEALTH CARE EDUCATION/TRAINING PROGRAM

## 2023-07-17 PROCEDURE — 1111F PR DISCHARGE MEDS RECONCILED W/ CURRENT OUTPATIENT MED LIST: ICD-10-PCS | Mod: CPTII,S$GLB,, | Performed by: STUDENT IN AN ORGANIZED HEALTH CARE EDUCATION/TRAINING PROGRAM

## 2023-07-17 PROCEDURE — 3008F PR BODY MASS INDEX (BMI) DOCUMENTED: ICD-10-PCS | Mod: CPTII,S$GLB,, | Performed by: STUDENT IN AN ORGANIZED HEALTH CARE EDUCATION/TRAINING PROGRAM

## 2023-07-17 PROCEDURE — 99999 PR PBB SHADOW E&M-EST. PATIENT-LVL V: ICD-10-PCS | Mod: PBBFAC,,, | Performed by: STUDENT IN AN ORGANIZED HEALTH CARE EDUCATION/TRAINING PROGRAM

## 2023-07-17 PROCEDURE — 3044F HG A1C LEVEL LT 7.0%: CPT | Mod: CPTII,S$GLB,, | Performed by: STUDENT IN AN ORGANIZED HEALTH CARE EDUCATION/TRAINING PROGRAM

## 2023-07-17 PROCEDURE — 1159F MED LIST DOCD IN RCRD: CPT | Mod: CPTII,S$GLB,, | Performed by: STUDENT IN AN ORGANIZED HEALTH CARE EDUCATION/TRAINING PROGRAM

## 2023-07-17 PROCEDURE — 1111F DSCHRG MED/CURRENT MED MERGE: CPT | Mod: CPTII,S$GLB,, | Performed by: STUDENT IN AN ORGANIZED HEALTH CARE EDUCATION/TRAINING PROGRAM

## 2023-07-17 PROCEDURE — 3077F SYST BP >= 140 MM HG: CPT | Mod: CPTII,S$GLB,, | Performed by: STUDENT IN AN ORGANIZED HEALTH CARE EDUCATION/TRAINING PROGRAM

## 2023-07-17 PROCEDURE — 3066F NEPHROPATHY DOC TX: CPT | Mod: CPTII,S$GLB,, | Performed by: STUDENT IN AN ORGANIZED HEALTH CARE EDUCATION/TRAINING PROGRAM

## 2023-07-17 PROCEDURE — 3077F PR MOST RECENT SYSTOLIC BLOOD PRESSURE >= 140 MM HG: ICD-10-PCS | Mod: CPTII,S$GLB,, | Performed by: STUDENT IN AN ORGANIZED HEALTH CARE EDUCATION/TRAINING PROGRAM

## 2023-07-17 PROCEDURE — 3080F DIAST BP >= 90 MM HG: CPT | Mod: CPTII,S$GLB,, | Performed by: STUDENT IN AN ORGANIZED HEALTH CARE EDUCATION/TRAINING PROGRAM

## 2023-07-17 RX ORDER — OLMESARTAN MEDOXOMIL 40 MG/1
40 TABLET ORAL DAILY
Qty: 90 TABLET | Refills: 3 | Status: SHIPPED | OUTPATIENT
Start: 2023-07-17 | End: 2024-02-21

## 2023-07-17 NOTE — PROGRESS NOTES
[facility] Note    Subjective:    The HPI and pertinent ROS is included in the Diagnostic Impression Remarks section at the end of the note. Please see below for further details. Chief complaint is at end of note.     João is a pleasant intelligent patient who is here for evaluation.     Modified Medications    No medications on file       Data reviewed 274}  Previous medical records reviewed and summarized in plan section at end of note.      If you are due for any health screening(s) below please notify me so we can arrange them to be ordered and scheduled. Most healthy patients at your age complete them, but you are free to accept or refuse. If you can't do it, I'll definitely understand. If you can, I'd certainly appreciate it!     All of your core healthy metrics are met.      The following portions of the patient's history were reviewed and updated as appropriate: allergies, current medications, past family history, past medical history, past social history, past surgical history and problem list.    He  has a past medical history of Allergy, Anemia, Anxiety, CHF (congestive heart failure), Depression, High blood pressure with chronic kidney disease, stage 5 chronic kidney disease or end stage renal disease, and Hypertension.  He  has a past surgical history that includes Hernia repair (Right) and insertion, catheter, tunneled (N/A, 6/22/2023).    He  reports that he has never smoked. He has never been exposed to tobacco smoke. He has never used smokeless tobacco. He reports that he does not drink alcohol and does not use drugs.  He family history is not on file.    Review of patient's allergies indicates:   Allergen Reactions    Mushroom Swelling     Tongue swells        Tobacco Use: Low Risk     Smoking Tobacco Use: Never    Smokeless Tobacco Use: Never    Passive Exposure: Never     Physical Examination  General appearance: alert, cooperative, no distress  Neck: no thyromegaly, no neck  "stiffness  Lungs: clear to auscultation, no wheezes, rales or rhonchi, symmetric air entry  Heart: normal rate, regular rhythm, normal S1, S2, no murmurs, rubs, clicks or gallops  Abdomen: soft, nontender, nondistended, no rigidity, rebound, or guarding.   Back: no point tenderness over spine  Extremities: peripheral pulses normal, no unilateral leg swelling or calf tenderness   Neurological:alert, oriented, normal speech, no new focal findings or movement disorder noted from baseline    BP Readings from Last 3 Encounters:   07/17/23 (!) 146/110   07/14/23 (!) 160/110   07/10/23 (!) 154/100     Wt Readings from Last 3 Encounters:   07/17/23 127.5 kg (281 lb)   07/14/23 127.6 kg (281 lb 4.9 oz)   07/10/23 130.3 kg (287 lb 5.9 oz)     BP (!) 146/110 (BP Location: Right arm, Patient Position: Sitting, BP Method: Large (Manual))   Pulse 103   Temp 98.6 °F (37 °C) (Oral)   Resp 16   Ht 6' 2" (1.88 m)   Wt 127.5 kg (281 lb)   SpO2 96%   BMI 36.08 kg/m²    274}  Laboratory: I have reviewed old labs below:    274}    Lab Results   Component Value Date    WBC 7.69 06/28/2023    HGB 11.8 (L) 06/28/2023    HCT 37.7 (L) 06/28/2023    MCV 95 06/28/2023     06/28/2023     06/28/2023    K 4.8 06/28/2023    CL 99 06/28/2023    CALCIUM 10.5 06/28/2023    PHOS 5.4 (H) 06/28/2023    CO2 27 06/28/2023     (H) 06/28/2023    BUN 37 (H) 06/28/2023    CREATININE 11.4 (H) 06/28/2023    ANIONGAP 13 06/28/2023    PROT 7.6 06/21/2023    ALBUMIN 4.0 06/28/2023    BILITOT 0.6 06/21/2023    ALKPHOS 41 (L) 06/21/2023    ALT 10 06/21/2023    AST 7 (L) 06/21/2023    INR 1.0 02/02/2023    CHOL 141 10/11/2022    TRIG 66 10/11/2022    HDL 39 (L) 10/11/2022    LDLCALC 88.8 10/11/2022    TSH 1.699 10/10/2022    PSA 0.36 01/10/2023    HGBA1C 5.5 06/22/2023     Lab reviewed by me: Particular labs of significance that I will monitor, workup, or treat to improve are mentioned below in diagnostic impression remarks.    Imaging/EKG: " "I have reviewed the pertinent results and my findings are noted in remarks.  274}    CC:   Chief Complaint   Patient presents with    Nausea    Hypertension        274}    Assessment/Plan  João Ham is a 43 y.o. male who presents to clinic with:  1. Healthcare maintenance    2. ESRD (end stage renal disease)    3. Pulmonary hypertension    4. Hypertensive heart and renal disease with congestive heart failure    5. Resistant hypertension    6. Severe obesity with body mass index (BMI) of 35.0 to 39.9 with comorbidity       274}  Diagnostic Impression Remarks + HPI     Documentation entered by me for this encounter may have been done in part using speech-recognition technology. Although I have made an effort to ensure accuracy, "sound like" errors may exist and should be interpreted in context.     Health maintenance-patient has been trouble unable to work and wanted to fill out some paperwork for disability.  Patient reports that he has fatigue and D has severe myalgias along with intermittent episodes of vomiting that occur.  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  Hypertension-needs improvement will replace losartan with olmesartan continue other medications continue dialysis    Myalgias-likely due to electrolytes monitor blood work    Vomiting-patient reports he has intermittent episodes of vomiting been sometimes after foods no abdominal pain no fever chills will get gastric emptying study.  Did have a stress test was normal recommended see GI as well    ESRD-continue dialysis monitor f/u nephrology watch kerri   CHF-stable continue current meds euvolemic no dyspnea " monitor    Severe obesity with comorbidity-Hypertension to which the severe obesity is a contributing factor. This is consistent with the definition of severe obesity with comorbidity. The patient's severe obesity was monitored, evaluated, addressed and/or treated. Diet and exercise recommended see counseling section for further info.     This is the extent of this pleasant patient's concerns at this present time. He did not feel chest pain upon exertion, , nausea, vomiting, diaphoresis, or syncope. No pleuritic chest pain, unilateral leg swelling, calf tenderness, or calf pain. Negative for unintentional weight loss night sweats and fevers. João will return to clinic in a few months for further workup and reassessment or sooner as needed. He was instructed to call the clinic or go to the emergency department or urgent care immediately if his symptoms do not improve, worsens, or if any new symptoms develop. As we discussed that symptoms could worsen over the next 24 hours he was advised that if any increased swelling, pain, or numbness arise to go immediately to the ED. Patient knows to call any time if an emergency arises. Shared decision making occurred and he verbalized understanding in agreement with this plan. I discussed imaging findings, diagnosis, possibilities, treatment options, medications, risks, and benefits. He had many questions regarding the options and long-term effects. All questions were answered. He expressed understanding after counseling regarding the diagnosis and recommendations. He was capable and demonstrated competence with understanding of these options. Shared decision making was performed resulting in him choosing the current treatment plan. Patient handout was given with instructions and recommendations. Advised the patient that if they become pregnant to alert us immediately to assess for medication changes. I also discussed the importance of close follow up to discuss labs, change  or modify his medications if needed, monitor side effects, and further evaluation of medical problems.     Additional workup planned: see labs ordered below.    See below for labs and meds ordered with associated diagnosis      1. Healthcare maintenance    2. ESRD (end stage renal disease)    3. Pulmonary hypertension    4. Hypertensive heart and renal disease with congestive heart failure    5. Resistant hypertension    6. Severe obesity with body mass index (BMI) of 35.0 to 39.9 with comorbidity      Dawson Granado MD   274}    If you are due for any health screening(s) below please notify me so we can arrange them to be ordered and scheduled. Most healthy patients at your age complete them, but you are free to accept or refuse.     If you can't do it, I'll definitely understand. If you can, I'd certainly appreciate it!   All of your core healthy metrics are met.

## 2023-07-25 ENCOUNTER — TELEPHONE (OUTPATIENT)
Dept: FAMILY MEDICINE | Facility: CLINIC | Age: 43
End: 2023-07-25

## 2023-07-25 ENCOUNTER — CLINICAL SUPPORT (OUTPATIENT)
Dept: FAMILY MEDICINE | Facility: CLINIC | Age: 43
End: 2023-07-25
Payer: COMMERCIAL

## 2023-07-25 VITALS — SYSTOLIC BLOOD PRESSURE: 98 MMHG | DIASTOLIC BLOOD PRESSURE: 72 MMHG

## 2023-07-25 DIAGNOSIS — I10 HYPERTENSION, UNSPECIFIED TYPE: Primary | ICD-10-CM

## 2023-07-25 PROCEDURE — 99999 PR PBB SHADOW E&M-EST. PATIENT-LVL I: CPT | Mod: PBBFAC,,,

## 2023-07-25 PROCEDURE — 99999 PR PBB SHADOW E&M-EST. PATIENT-LVL I: ICD-10-PCS | Mod: PBBFAC,,,

## 2023-07-25 NOTE — TELEPHONE ENCOUNTER
Patient came in today for a BP check. He reports he has been taking his medications as directed and made the change from losartan 100 mg daily to olmesartan 40 mg daily and has not had any adverse or ill effects. Reports that he takes his BP at home as directed and takes his hydralazine when systolic reading is high. He did not take this med today.  Readings vary at home depending on when he goes to dialysis.   Manual BP taken here this morning is 98/72. Patient is asymptomatic.  Please advise of instructions for patient.

## 2023-07-25 NOTE — TELEPHONE ENCOUNTER
Dawson Granado MD  You 1 hour ago (2:12 PM)       Bp seems lower than normal rec he write down his bp and send in a log after a week to me thanks

## 2023-07-25 NOTE — PROGRESS NOTES
Patient came in today for a BP check. He reports he has been taking his medications as directed and made the change from losartan 100 mg daily to olmesartan 40 mg daily and has not had any adverse or ill effects. Reports that he takes his BP at home as directed and takes his hydralazine when systolic reading is high. He did not take this med today.  Readings vary at home depending on when he goes to dialysis.   Manual BP taken here this morning is 98/72. Patient is asymptomatic.   A message has been sent to Dr Granado to advise of today's findings.

## 2023-07-26 ENCOUNTER — PATIENT MESSAGE (OUTPATIENT)
Dept: FAMILY MEDICINE | Facility: CLINIC | Age: 43
End: 2023-07-26
Payer: COMMERCIAL

## 2023-07-27 ENCOUNTER — TELEPHONE (OUTPATIENT)
Dept: TRANSPLANT | Facility: CLINIC | Age: 43
End: 2023-07-27
Payer: COMMERCIAL

## 2023-07-27 LAB — HPRA INTERPRETATION: NORMAL

## 2023-07-31 LAB
HLA FREEZE AND HOLD INTERPRETATION: NORMAL
HLAFH COLLECTION DATE: NORMAL

## 2023-08-03 ENCOUNTER — HOSPITAL ENCOUNTER (OUTPATIENT)
Dept: RADIOLOGY | Facility: HOSPITAL | Age: 43
Discharge: HOME | End: 2023-08-03
Attending: STUDENT IN AN ORGANIZED HEALTH CARE EDUCATION/TRAINING PROGRAM
Payer: COMMERCIAL

## 2023-08-03 DIAGNOSIS — R11.0 NAUSEA: ICD-10-CM

## 2023-08-03 PROCEDURE — 78264 GASTRIC EMPTYING IMG STUDY: CPT | Mod: TC

## 2023-08-07 PROCEDURE — 86832 HLA CLASS I HIGH DEFIN QUAL: CPT | Mod: PO,TXP | Performed by: NURSE PRACTITIONER

## 2023-08-07 PROCEDURE — 86833 HLA CLASS II HIGH DEFIN QUAL: CPT | Mod: PO,TXP | Performed by: NURSE PRACTITIONER

## 2023-08-10 ENCOUNTER — PATIENT MESSAGE (OUTPATIENT)
Dept: PULMONOLOGY | Facility: CLINIC | Age: 43
End: 2023-08-10
Payer: COMMERCIAL

## 2023-08-14 ENCOUNTER — TELEPHONE (OUTPATIENT)
Dept: PULMONOLOGY | Facility: CLINIC | Age: 43
End: 2023-08-14
Payer: COMMERCIAL

## 2023-08-16 ENCOUNTER — LAB VISIT (OUTPATIENT)
Dept: LAB | Facility: HOSPITAL | Age: 43
End: 2023-08-16
Payer: COMMERCIAL

## 2023-08-16 DIAGNOSIS — Z76.82 ORGAN TRANSPLANT CANDIDATE: ICD-10-CM

## 2023-08-21 ENCOUNTER — LAB VISIT (OUTPATIENT)
Dept: LAB | Facility: HOSPITAL | Age: 43
End: 2023-08-21
Attending: NURSE PRACTITIONER
Payer: COMMERCIAL

## 2023-08-21 DIAGNOSIS — Z76.82 ORGAN TRANSPLANT CANDIDATE: ICD-10-CM

## 2023-08-21 PROCEDURE — 99001 SPECIMEN HANDLING PT-LAB: CPT | Mod: PO,TXP | Performed by: NURSE PRACTITIONER

## 2023-08-23 ENCOUNTER — PATIENT MESSAGE (OUTPATIENT)
Dept: FAMILY MEDICINE | Facility: CLINIC | Age: 43
End: 2023-08-23
Payer: COMMERCIAL

## 2023-09-06 PROCEDURE — 99001 SPECIMEN HANDLING PT-LAB: CPT | Mod: PO,TXP | Performed by: NURSE PRACTITIONER

## 2023-09-07 ENCOUNTER — TELEPHONE (OUTPATIENT)
Dept: TRANSPLANT | Facility: CLINIC | Age: 43
End: 2023-09-07
Payer: COMMERCIAL

## 2023-09-07 LAB — HPRA INTERPRETATION: NORMAL

## 2023-09-11 LAB
CLASS I ANTIBODY COMMENTS - LUMINEX: NORMAL
CLASS II ANTIBODIES - LUMINEX: NEGATIVE
CPRA %: 0
SERUM COLLECTION DT - LUMINEX CLASS I: NORMAL
SERUM COLLECTION DT - LUMINEX CLASS II: NORMAL
SPCL1 TESTING DATE: NORMAL
SPCL2 TESTING DATE: NORMAL
SPCLU TESTING DATE: NORMAL

## 2023-09-13 ENCOUNTER — LAB VISIT (OUTPATIENT)
Dept: LAB | Facility: HOSPITAL | Age: 43
End: 2023-09-13
Payer: COMMERCIAL

## 2023-09-13 DIAGNOSIS — Z76.82 ORGAN TRANSPLANT CANDIDATE: ICD-10-CM

## 2023-09-13 LAB
HLA FREEZE AND HOLD INTERPRETATION: NORMAL
HLAFH COLLECTION DATE: NORMAL
HPRA INTERPRETATION: NORMAL

## 2023-09-28 ENCOUNTER — OFFICE VISIT (OUTPATIENT)
Dept: PULMONOLOGY | Facility: CLINIC | Age: 43
End: 2023-09-28
Payer: COMMERCIAL

## 2023-09-28 VITALS
BODY MASS INDEX: 37.83 KG/M2 | OXYGEN SATURATION: 95 % | SYSTOLIC BLOOD PRESSURE: 111 MMHG | DIASTOLIC BLOOD PRESSURE: 68 MMHG | HEART RATE: 74 BPM | WEIGHT: 294.63 LBS

## 2023-09-28 DIAGNOSIS — G47.33 OSA (OBSTRUCTIVE SLEEP APNEA): Primary | ICD-10-CM

## 2023-09-28 DIAGNOSIS — I27.20 PULMONARY HYPERTENSION: ICD-10-CM

## 2023-09-28 DIAGNOSIS — N18.6 ESRD (END STAGE RENAL DISEASE): ICD-10-CM

## 2023-09-28 DIAGNOSIS — G47.30 SLEEP APNEA, UNSPECIFIED TYPE: ICD-10-CM

## 2023-09-28 DIAGNOSIS — I13.0 HYPERTENSIVE HEART AND RENAL DISEASE WITH CONGESTIVE HEART FAILURE: ICD-10-CM

## 2023-09-28 DIAGNOSIS — E66.01 SEVERE OBESITY WITH BODY MASS INDEX (BMI) OF 35.0 TO 39.9 WITH COMORBIDITY: ICD-10-CM

## 2023-09-28 PROCEDURE — 4010F ACE/ARB THERAPY RXD/TAKEN: CPT | Mod: CPTII,NTX,S$GLB, | Performed by: NURSE PRACTITIONER

## 2023-09-28 PROCEDURE — 3074F PR MOST RECENT SYSTOLIC BLOOD PRESSURE < 130 MM HG: ICD-10-PCS | Mod: CPTII,NTX,S$GLB, | Performed by: NURSE PRACTITIONER

## 2023-09-28 PROCEDURE — 3008F PR BODY MASS INDEX (BMI) DOCUMENTED: ICD-10-PCS | Mod: CPTII,NTX,S$GLB, | Performed by: NURSE PRACTITIONER

## 2023-09-28 PROCEDURE — 99214 OFFICE O/P EST MOD 30 MIN: CPT | Mod: NTX,S$GLB,, | Performed by: NURSE PRACTITIONER

## 2023-09-28 PROCEDURE — 1159F PR MEDICATION LIST DOCUMENTED IN MEDICAL RECORD: ICD-10-PCS | Mod: CPTII,NTX,S$GLB, | Performed by: NURSE PRACTITIONER

## 2023-09-28 PROCEDURE — 3078F DIAST BP <80 MM HG: CPT | Mod: CPTII,NTX,S$GLB, | Performed by: NURSE PRACTITIONER

## 2023-09-28 PROCEDURE — 3078F PR MOST RECENT DIASTOLIC BLOOD PRESSURE < 80 MM HG: ICD-10-PCS | Mod: CPTII,NTX,S$GLB, | Performed by: NURSE PRACTITIONER

## 2023-09-28 PROCEDURE — 3008F BODY MASS INDEX DOCD: CPT | Mod: CPTII,NTX,S$GLB, | Performed by: NURSE PRACTITIONER

## 2023-09-28 PROCEDURE — 3074F SYST BP LT 130 MM HG: CPT | Mod: CPTII,NTX,S$GLB, | Performed by: NURSE PRACTITIONER

## 2023-09-28 PROCEDURE — 99999 PR PBB SHADOW E&M-EST. PATIENT-LVL IV: CPT | Mod: PBBFAC,TXP,, | Performed by: NURSE PRACTITIONER

## 2023-09-28 PROCEDURE — 3044F HG A1C LEVEL LT 7.0%: CPT | Mod: CPTII,NTX,S$GLB, | Performed by: NURSE PRACTITIONER

## 2023-09-28 PROCEDURE — 1159F MED LIST DOCD IN RCRD: CPT | Mod: CPTII,NTX,S$GLB, | Performed by: NURSE PRACTITIONER

## 2023-09-28 PROCEDURE — 99214 PR OFFICE/OUTPT VISIT, EST, LEVL IV, 30-39 MIN: ICD-10-PCS | Mod: NTX,S$GLB,, | Performed by: NURSE PRACTITIONER

## 2023-09-28 PROCEDURE — 99999 PR PBB SHADOW E&M-EST. PATIENT-LVL IV: ICD-10-PCS | Mod: PBBFAC,TXP,, | Performed by: NURSE PRACTITIONER

## 2023-09-28 PROCEDURE — 4010F PR ACE/ARB THEARPY RXD/TAKEN: ICD-10-PCS | Mod: CPTII,NTX,S$GLB, | Performed by: NURSE PRACTITIONER

## 2023-09-28 PROCEDURE — 3044F PR MOST RECENT HEMOGLOBIN A1C LEVEL <7.0%: ICD-10-PCS | Mod: CPTII,NTX,S$GLB, | Performed by: NURSE PRACTITIONER

## 2023-09-28 PROCEDURE — 3066F PR DOCUMENTATION OF TREATMENT FOR NEPHROPATHY: ICD-10-PCS | Mod: CPTII,NTX,S$GLB, | Performed by: NURSE PRACTITIONER

## 2023-09-28 PROCEDURE — 3066F NEPHROPATHY DOC TX: CPT | Mod: CPTII,NTX,S$GLB, | Performed by: NURSE PRACTITIONER

## 2023-09-28 RX ORDER — OLMESARTAN MEDOXOMIL 40 MG/1
1 TABLET ORAL DAILY
COMMUNITY
Start: 2023-07-17 | End: 2024-07-16

## 2023-09-28 RX ORDER — FUROSEMIDE 80 MG/1
80 TABLET ORAL
COMMUNITY
Start: 2023-06-28

## 2023-09-28 RX ORDER — METOPROLOL SUCCINATE 50 MG/1
150 TABLET, EXTENDED RELEASE ORAL
COMMUNITY
Start: 2023-06-28

## 2023-09-28 RX ORDER — CLONIDINE HYDROCHLORIDE 0.3 MG/1
0.3 TABLET ORAL 3 TIMES DAILY PRN
COMMUNITY
Start: 2023-06-29

## 2023-09-28 RX ORDER — ACETAMINOPHEN 500 MG
1 TABLET ORAL DAILY
COMMUNITY
Start: 2022-10-18

## 2023-09-28 RX ORDER — ISOSORBIDE MONONITRATE 120 MG/1
120 TABLET, EXTENDED RELEASE ORAL
COMMUNITY
Start: 2023-06-28

## 2023-09-28 NOTE — PROGRESS NOTES
9/28/2023    João Ham  In office visit     Chief Complaint   Patient presents with    Sleep Apnea     Complaince visit       HPI:  09/28/2023:  Underwent PSG in March 2023 revealing AHI 72.7 - subsequently underwent Titration study in June 2023 revealing the need for Bipap therapy.   States he received his BiPAP machine approximately 2 months ago.  Has been using most nights with great reported benefit.  He does notice that he is able to sleep for longer duration of time throughout the night whereas in the past he would not sleep longer than 2-3 hours at any given time.  Works night shifts, endorses less fatigue while awake.  Is currently using a fullface mask however does experience frequent air leaks throughout the night.  Feels as if the pressures are okay upon initiation of BiPAP, however can notice a difference whenever the pressures are increased.  Patient states he suffered with a period of chest and sinus congestion over the last month which hindered his use of BiPAP at that time.  Patient is interested in BiPAP compliance and therapy.  BiPAP compliance report reviewed from dates 08/28/2023 -09/26/2023  Usage greater than equal to 4 hours 63%  25/4  95th percentile air leak - 42.8  AHI 15.3  Recently also started HD for chronic kidney disease.  Currently receiving HD every Monday Wednesday Friday.  Patient receiving HD through [facility].  States he is hopeful for upcoming renal transplant.          12/13/2022:  Denies history of lung disease, inhaler use, supplemental oxygen use, or CPAP use. Denies personal history of cancer, PE, anticoagulation use.   Recent hospitalization at [facility] on 10/10/22 - patient diagnosed with acute renal failure, CHF, HTN Emergency, and Pulmonary Edema. Patient underwent cardiac and nephrology consults while inpatient. Underwent renal biopsy. Was subsequently dc home on 10/18. Patient currently following with nephrology, Dr. Hanna - is not currently on dialysis  however anticipates may need in the future, possible work up for kidney transplant.   Endorses daytime fatigue, nocturnal arousals. Denies morning headaches, depression.  Denies wheezing, chest tightness. Denies cough, mucous production.   Does endorse shortness of breath prior to hospitalization however since discharged home has improved.   Diagnosed with COVID and subsequent pneumonia in Jan 2022 - did not require hospitalization at that time.   Patient Instructions   I have ordered an at home sleep study to evaluate for sleep apnea. If test is positive, I will order a CPAP machine. Please notify my clinic when you receive the machine to set up a 31 day compliance appointment. You must use the machine for a least 4 hours every night to avoid insurance denial.   Continue current medication regiment. Keep follow up appointment as scheduled. Please call the office if you have any questions or concerns.         Social Hx: Lives with nephew - no animals in the home. Work at [employer]; however has been out of work since October. No Asbestosis exposure, Smoking Hx: Never smoker  Family Hx: No Lung Cancer, No COPD, No Asthma  Medical Hx: Previous pneumonia ; No previous shoulder/chest surgery        The chief compliant problem varies with instability at times.   PFSH:  Past Medical History:   Diagnosis Date    Allergy     Anemia     Anxiety     CHF (congestive heart failure)     Depression     High blood pressure with chronic kidney disease, stage 5 chronic kidney disease or end stage renal disease     Hypertension          Past Surgical History:   Procedure Laterality Date    HERNIA REPAIR Right     With mesh    INSERTION, CATHETER, TUNNELED N/A 6/22/2023    Procedure: Insertion,catheter,tunneled;  Surgeon: Everett Caicedo MD;  Location: [facility] OR;  Service: General;  Laterality: N/A;     Social History     Tobacco Use    Smoking status: Never     Passive exposure: Never    Smokeless tobacco: Never   Substance Use  Topics    Alcohol use: Never    Drug use: Never     History reviewed. No pertinent family history.  Review of patient's allergies indicates:   Allergen Reactions    Mushroom Swelling     Tongue swells      I have reviewed past medical, family, and social history. I have reviewed previous nurse notes.    Performance Status:The patient's activity level is functions out of house.      Review of Systems:  a review of eleven systems covering constitutional, Eye, HEENT, Psych, Respiratory, Cardiac, GI, , Musculoskeletal, Endocrine, Dermatologic was negative except for pertinent findings as listed ABOVE and below: pertinent positive as above, rest is good       Exam:Comprehensive exam done. /68   Pulse 74   Wt 133.7 kg (294 lb 10.3 oz)   SpO2 95% Comment: Room air at rest  BMI 37.83 kg/m²   Exam included Vitals as listed  Constitutional: He is oriented to person, place, and time. He appears well-developed. No distress.   Nose: Nose normal.   Mouth/Throat: Uvula is midline, oropharynx is clear and moist and mucous membranes are normal. No dental caries. No oropharyngeal exudate, posterior oropharyngeal edema, posterior oropharyngeal erythema or tonsillar abscesses.  Eyes: Pupils are equal, round, and reactive to light.   Neck: No JVD present. No thyromegaly present.   Cardiovascular: Normal rate, regular rhythm and normal heart sounds. Exam reveals no gallop and no friction rub.   Murmur heard.  Pulmonary/Chest: Effort normal and breath sounds normal. No accessory muscle usage or stridor. No apnea and no tachypnea. No respiratory distress, decreased breath sounds, wheezes, rhonchi, rales, or tenderness. Clear breath sounds throughout, on room air, in no acute distress.  Abdominal: Soft. He exhibits no mass. There is no tenderness. No hepatosplenomegaly, hernias and normoactive bowel sounds  Musculoskeletal: Normal range of motion. exhibits no edema.   Neurological:  alert and oriented to person, place, and time.  not disoriented.   Skin: Skin is warm and dry. Capillary refill takes less 2 sec. No cyanosis or erythema. No pallor. Nails show no clubbing.   Psychiatric: normal mood and affect. behavior is normal. Judgment and thought content normal.       Radiographs (ct chest and cxr) reviewed: view by direct vision   Patient imaging studies were reviewed and interpreted independently. My personal interpretation of most recent Chest Xray and CT Chest includes:  CXR - 6/27/2023 - no acute process noted      X-Ray Chest 1 View 10/10/2022 FINDINGS:  The heart is mildly enlarged.  The lungs are well expanded without consolidation or pleural effusion.   Impression:   Cardiomegaly.      Patient's labs were reviewed including CBC and CMP    Lab Results   Component Value Date    WBC 7.69 06/28/2023    RBC 3.97 (L) 06/28/2023    HGB 11.8 (L) 06/28/2023    HCT 37.7 (L) 06/28/2023    MCV 95 06/28/2023    MCH 29.7 06/28/2023    MCHC 31.3 (L) 06/28/2023    RDW 13.4 06/28/2023     06/28/2023    MPV 10.5 06/28/2023    GRAN 4.8 06/28/2023    GRAN 62.1 06/28/2023    LYMPH 1.3 06/28/2023    LYMPH 17.2 (L) 06/28/2023    MONO 1.5 (H) 06/28/2023    MONO 19.2 (H) 06/28/2023    EOS 0.1 06/28/2023    BASO 0.02 06/28/2023    EOSINOPHIL 0.9 06/28/2023    BASOPHIL 0.3 06/28/2023   CMP  Sodium   Date Value Ref Range Status   06/28/2023 139 136 - 145 mmol/L Final     Potassium   Date Value Ref Range Status   06/28/2023 4.8 3.5 - 5.1 mmol/L Final     Chloride   Date Value Ref Range Status   06/28/2023 99 95 - 110 mmol/L Final     CO2   Date Value Ref Range Status   06/28/2023 27 23 - 29 mmol/L Final     Glucose   Date Value Ref Range Status   06/28/2023 112 (H) 70 - 110 mg/dL Final     BUN   Date Value Ref Range Status   06/28/2023 37 (H) 6 - 20 mg/dL Final     Creatinine   Date Value Ref Range Status   06/28/2023 11.4 (H) 0.5 - 1.4 mg/dL Final     Calcium   Date Value Ref Range Status   06/28/2023 10.5 8.7 - 10.5 mg/dL Final     Total Protein    Date Value Ref Range Status   06/21/2023 7.6 6.0 - 8.4 g/dL Final     Albumin   Date Value Ref Range Status   06/28/2023 4.0 3.5 - 5.2 g/dL Final     Total Bilirubin   Date Value Ref Range Status   06/21/2023 0.6 0.1 - 1.0 mg/dL Final     Comment:     For infants and newborns, interpretation of results should be based  on gestational age, weight and in agreement with clinical  observations.    Premature Infant recommended reference ranges:  Up to 24 hours.............<8.0 mg/dL  Up to 48 hours............<12.0 mg/dL  3-5 days..................<15.0 mg/dL  6-29 days.................<15.0 mg/dL       Alkaline Phosphatase   Date Value Ref Range Status   06/21/2023 41 (L) 55 - 135 U/L Final     AST   Date Value Ref Range Status   06/21/2023 7 (L) 10 - 40 U/L Final     ALT   Date Value Ref Range Status   06/21/2023 10 10 - 44 U/L Final     Anion Gap   Date Value Ref Range Status   06/28/2023 13 8 - 16 mmol/L Final     eGFR   Date Value Ref Range Status   06/28/2023 5.2 (A) >60 mL/min/1.73 m^2 Final         PFT was not done  Pulmonary Functions Testing Results:      Transthoracic echo (TTE) complete Summary 10/11/2022  The estimated PA systolic pressure is 48 mmHg.  The left ventricle is normal in size with mild concentric hypertrophy and normal systolic function.  Grade III left ventricular diastolic dysfunction.  Moderate right ventricular enlargement with mildly to moderately reduced right ventricular systolic function.  Severe left atrial enlargement.  There is moderate pulmonary hypertension.  Intermediate central venous pressure (8 mmHg).  Mild-to-moderate mitral regurgitation.  Moderate to severe tricuspid regurgitation.  Moderate right atrial enlargement.  The estimated ejection fraction is 60%.  Atrial fibrillation not observed.        Plan:  Clinical impression is resonably certain and repeated evaluation prn +/- follow up will be needed as below.    João was seen today for sleep apnea.    Diagnoses and all  orders for this visit:    CLOVIS (obstructive sleep apnea)    Sleep apnea, unspecified type  -     Ambulatory referral/consult to Pulmonology    Pulmonary hypertension    Hypertensive heart and renal disease with congestive heart failure    ESRD (end stage renal disease)    Severe obesity with body mass index (BMI) of 35.0 to 39.9 with comorbidity    Body mass index is 37.83 kg/m².       Morbid obesity complicates all aspects of disease management from diagnostic modalities to treatment. Weight loss encouraged and health benefits explained to patient. Nutritional counseling and physical activity encouraged.       Follow up in about 3 months (around 12/28/2023), or if symptoms worsen or fail to improve.    Discussed with patient above for education the following:      Patient Instructions   BiPAP compliance report looks okay, however insurance may require more compliance with greater than 4 hour duration of use.  Will repeat BiPAP compliance report in approximately 1 month.    The AHI on the compliance report is still elevated at 15.3 - I will change your BiPAP pressures at this time to 18/14 and see if that helps.  Please let me know if you are unable to tolerate these new pressures and I will change.    Keep follow-up with Nephrology.    Continue current prescription medication regiment. Keep follow up appointment as scheduled. Please call the office if you have any questions or concerns.

## 2023-09-28 NOTE — PATIENT INSTRUCTIONS
BiPAP compliance report looks okay, however insurance may require more compliance with greater than 4 hour duration of use.  Will repeat BiPAP compliance report in approximately 1 month.    The AHI on the compliance report is still elevated at 15.3 - I will change your BiPAP pressures at this time to 18/14 and see if that helps.  Please let me know if you are unable to tolerate these new pressures and I will change.    Keep follow-up with Nephrology.    Continue current prescription medication regiment. Keep follow up appointment as scheduled. Please call the office if you have any questions or concerns.

## 2023-10-02 PROCEDURE — 86832 HLA CLASS I HIGH DEFIN QUAL: CPT | Mod: PO,TXP | Performed by: NURSE PRACTITIONER

## 2023-10-02 PROCEDURE — 86833 HLA CLASS II HIGH DEFIN QUAL: CPT | Mod: PO,TXP | Performed by: NURSE PRACTITIONER

## 2023-10-04 ENCOUNTER — LAB VISIT (OUTPATIENT)
Dept: LAB | Facility: HOSPITAL | Age: 43
End: 2023-10-04
Payer: COMMERCIAL

## 2023-10-04 DIAGNOSIS — Z76.82 ORGAN TRANSPLANT CANDIDATE: ICD-10-CM

## 2023-10-05 ENCOUNTER — PATIENT MESSAGE (OUTPATIENT)
Dept: PULMONOLOGY | Facility: CLINIC | Age: 43
End: 2023-10-05
Payer: COMMERCIAL

## 2023-10-07 LAB — HPRA INTERPRETATION: NORMAL

## 2023-10-17 ENCOUNTER — TELEPHONE (OUTPATIENT)
Dept: PULMONOLOGY | Facility: CLINIC | Age: 43
End: 2023-10-17
Payer: COMMERCIAL

## 2023-10-17 NOTE — TELEPHONE ENCOUNTER
----- Message from Carisa Pedroza NP sent at 10/6/2023 10:17 AM CDT -----  Regarding: Bipap  Please call patient and let him know I reached out to RT Ritika at [facility]. His Bipap report shows the pressure change was made correctly but there is still a significant amount of air leaks occurring throughout the night.  Ritika is supposed to reach out to the patient to schedule a mask fitting, troubleshoot machine.

## 2023-10-19 LAB
CLASS I ANTIBODIES - LUMINEX: NORMAL
CLASS I ANTIBODY COMMENTS - LUMINEX: NORMAL
CLASS II ANTIBODIES - LUMINEX: NEGATIVE
CPRA %: 1
SERUM COLLECTION DT - LUMINEX CLASS I: NORMAL
SERUM COLLECTION DT - LUMINEX CLASS II: NORMAL
SPCL1 TESTING DATE: NORMAL
SPCL2 TESTING DATE: NORMAL
SPCLU TESTING DATE: NORMAL

## 2023-11-01 PROCEDURE — 99001 SPECIMEN HANDLING PT-LAB: CPT | Mod: PO,TXP | Performed by: NURSE PRACTITIONER

## 2023-11-02 NOTE — ASSESSMENT & PLAN NOTE
Body mass index is 37.49 kg/m². Morbid obesity complicates all aspects of disease management from diagnostic modalities to treatment. Weight loss encouraged and health benefits explained to patient.        No

## 2023-11-06 ENCOUNTER — LAB VISIT (OUTPATIENT)
Dept: LAB | Facility: HOSPITAL | Age: 43
End: 2023-11-06
Payer: COMMERCIAL

## 2023-11-06 DIAGNOSIS — Z76.82 ORGAN TRANSPLANT CANDIDATE: ICD-10-CM

## 2023-11-08 ENCOUNTER — TELEPHONE (OUTPATIENT)
Dept: TRANSPLANT | Facility: CLINIC | Age: 43
End: 2023-11-08
Payer: COMMERCIAL

## 2023-11-27 ENCOUNTER — TELEPHONE (OUTPATIENT)
Dept: OTOLARYNGOLOGY | Facility: CLINIC | Age: 43
End: 2023-11-27
Payer: COMMERCIAL

## 2023-12-11 ENCOUNTER — LAB VISIT (OUTPATIENT)
Dept: LAB | Facility: HOSPITAL | Age: 43
End: 2023-12-11
Payer: COMMERCIAL

## 2023-12-11 DIAGNOSIS — Z76.82 ORGAN TRANSPLANT CANDIDATE: ICD-10-CM

## 2024-01-03 LAB
CLASS I ANTIBODIES - LUMINEX: NORMAL
CLASS II ANTIBODIES - LUMINEX: NEGATIVE
CPRA %: 1
SERUM COLLECTION DT - LUMINEX CLASS I: NORMAL
SERUM COLLECTION DT - LUMINEX CLASS II: NORMAL
SPCL1 TESTING DATE: NORMAL
SPCL2 TESTING DATE: NORMAL
SPCLU TESTING DATE: NORMAL

## 2024-01-06 ENCOUNTER — LAB VISIT (OUTPATIENT)
Dept: LAB | Facility: HOSPITAL | Age: 44
End: 2024-01-06
Payer: COMMERCIAL

## 2024-01-06 DIAGNOSIS — Z76.82 ORGAN TRANSPLANT CANDIDATE: ICD-10-CM

## 2024-02-05 PROCEDURE — 86833 HLA CLASS II HIGH DEFIN QUAL: CPT | Mod: PO,TXP | Performed by: NURSE PRACTITIONER

## 2024-02-05 PROCEDURE — 86832 HLA CLASS I HIGH DEFIN QUAL: CPT | Mod: PO,TXP | Performed by: NURSE PRACTITIONER

## 2024-02-06 LAB — HPRA INTERPRETATION: NORMAL

## 2024-02-06 PROCEDURE — 86833 HLA CLASS II HIGH DEFIN QUAL: CPT | Mod: PO,TXP | Performed by: NURSE PRACTITIONER

## 2024-02-06 PROCEDURE — 86832 HLA CLASS I HIGH DEFIN QUAL: CPT | Mod: PO,TXP | Performed by: NURSE PRACTITIONER

## 2024-02-16 ENCOUNTER — LAB VISIT (OUTPATIENT)
Dept: LAB | Facility: HOSPITAL | Age: 44
End: 2024-02-16
Payer: COMMERCIAL

## 2024-02-16 DIAGNOSIS — Z76.82 ORGAN TRANSPLANT CANDIDATE: ICD-10-CM

## 2024-02-20 PROCEDURE — 99001 SPECIMEN HANDLING PT-LAB: CPT | Mod: PO,TXP | Performed by: NURSE PRACTITIONER

## 2024-02-21 ENCOUNTER — HOSPITAL ENCOUNTER (OUTPATIENT)
Dept: RADIOLOGY | Facility: CLINIC | Age: 44
Discharge: HOME | End: 2024-02-21
Attending: STUDENT IN AN ORGANIZED HEALTH CARE EDUCATION/TRAINING PROGRAM
Payer: COMMERCIAL

## 2024-02-21 ENCOUNTER — TELEPHONE (OUTPATIENT)
Dept: FAMILY MEDICINE | Facility: CLINIC | Age: 44
End: 2024-02-21

## 2024-02-21 ENCOUNTER — OFFICE VISIT (OUTPATIENT)
Dept: FAMILY MEDICINE | Facility: CLINIC | Age: 44
End: 2024-02-21
Payer: COMMERCIAL

## 2024-02-21 VITALS
TEMPERATURE: 98 F | SYSTOLIC BLOOD PRESSURE: 150 MMHG | HEART RATE: 75 BPM | OXYGEN SATURATION: 98 % | DIASTOLIC BLOOD PRESSURE: 100 MMHG | RESPIRATION RATE: 18 BRPM | WEIGHT: 299.81 LBS | HEIGHT: 74 IN | BODY MASS INDEX: 38.48 KG/M2

## 2024-02-21 DIAGNOSIS — E66.01 SEVERE OBESITY WITH BODY MASS INDEX (BMI) OF 35.0 TO 39.9 WITH COMORBIDITY: ICD-10-CM

## 2024-02-21 DIAGNOSIS — N18.6 ESRD (END STAGE RENAL DISEASE): ICD-10-CM

## 2024-02-21 DIAGNOSIS — B95.8 STAPH INFECTION: ICD-10-CM

## 2024-02-21 DIAGNOSIS — I50.32 CHRONIC HEART FAILURE WITH PRESERVED EJECTION FRACTION: ICD-10-CM

## 2024-02-21 DIAGNOSIS — R07.9 RIGHT-SIDED CHEST PAIN: ICD-10-CM

## 2024-02-21 DIAGNOSIS — Z00.00 HEALTHCARE MAINTENANCE: Primary | ICD-10-CM

## 2024-02-21 DIAGNOSIS — I27.20 PULMONARY HYPERTENSION: ICD-10-CM

## 2024-02-21 DIAGNOSIS — Z99.2 DEPENDENCE ON RENAL DIALYSIS: ICD-10-CM

## 2024-02-21 DIAGNOSIS — I1A.0 RESISTANT HYPERTENSION: ICD-10-CM

## 2024-02-21 DIAGNOSIS — E21.3 HYPERPARATHYROIDISM: ICD-10-CM

## 2024-02-21 PROCEDURE — 71046 X-RAY EXAM CHEST 2 VIEWS: CPT | Mod: TC,FY,PO,NTX

## 2024-02-21 PROCEDURE — 3077F SYST BP >= 140 MM HG: CPT | Mod: CPTII,S$GLB,, | Performed by: STUDENT IN AN ORGANIZED HEALTH CARE EDUCATION/TRAINING PROGRAM

## 2024-02-21 PROCEDURE — 99999 PR PBB SHADOW E&M-EST. PATIENT-LVL V: CPT | Mod: PBBFAC,,, | Performed by: STUDENT IN AN ORGANIZED HEALTH CARE EDUCATION/TRAINING PROGRAM

## 2024-02-21 PROCEDURE — 3079F DIAST BP 80-89 MM HG: CPT | Mod: CPTII,S$GLB,, | Performed by: STUDENT IN AN ORGANIZED HEALTH CARE EDUCATION/TRAINING PROGRAM

## 2024-02-21 PROCEDURE — 99215 OFFICE O/P EST HI 40 MIN: CPT | Mod: S$GLB,,, | Performed by: STUDENT IN AN ORGANIZED HEALTH CARE EDUCATION/TRAINING PROGRAM

## 2024-02-21 PROCEDURE — 71046 X-RAY EXAM CHEST 2 VIEWS: CPT | Mod: 26,NTX,S$GLB, | Performed by: RADIOLOGY

## 2024-02-21 PROCEDURE — 1159F MED LIST DOCD IN RCRD: CPT | Mod: CPTII,S$GLB,, | Performed by: STUDENT IN AN ORGANIZED HEALTH CARE EDUCATION/TRAINING PROGRAM

## 2024-02-21 PROCEDURE — 3008F BODY MASS INDEX DOCD: CPT | Mod: CPTII,S$GLB,, | Performed by: STUDENT IN AN ORGANIZED HEALTH CARE EDUCATION/TRAINING PROGRAM

## 2024-02-21 RX ORDER — GABAPENTIN 100 MG/1
100 CAPSULE ORAL 3 TIMES DAILY
Qty: 90 CAPSULE | Refills: 0 | Status: SHIPPED | OUTPATIENT
Start: 2024-02-21 | End: 2024-04-30

## 2024-02-21 RX ORDER — HYDROCODONE BITARTRATE AND ACETAMINOPHEN 5; 325 MG/1; MG/1
1 TABLET ORAL EVERY 12 HOURS PRN
Qty: 10 TABLET | Refills: 0 | Status: SHIPPED | OUTPATIENT
Start: 2024-02-21 | End: 2024-02-21

## 2024-02-21 RX ORDER — HYDROCODONE BITARTRATE AND ACETAMINOPHEN 5; 325 MG/1; MG/1
1 TABLET ORAL EVERY 12 HOURS PRN
Qty: 10 TABLET | Refills: 0 | Status: SHIPPED | OUTPATIENT
Start: 2024-02-21 | End: 2024-02-26

## 2024-02-21 NOTE — PROGRESS NOTES
[facility] Note    Subjective:    The HPI and pertinent ROS is included in the Diagnostic Impression Remarks section at the end of the note. Please see below for further details. Chief complaint is at end of note.     João is a pleasant intelligent patient who is here for evaluation.     Modified Medications    No medications on file       Data reviewed 274}  Previous medical records reviewed and summarized in plan section at end of note.      If you are due for any health screening(s) below please notify me so we can arrange them to be ordered and scheduled. Most healthy patients at your age complete them, but you are free to accept or refuse. If you can't do it, I'll definitely understand. If you can, I'd certainly appreciate it!     All of your core healthy metrics are met.      The following portions of the patient's history were reviewed and updated as appropriate: allergies, current medications, past family history, past medical history, past social history, past surgical history and problem list.    He  has a past medical history of Allergy, Anemia, Anxiety, CHF (congestive heart failure), Depression, High blood pressure with chronic kidney disease, stage 5 chronic kidney disease or end stage renal disease, and Hypertension.  He  has a past surgical history that includes Hernia repair (Right) and insertion, catheter, tunneled (N/A, 6/22/2023).    He  reports that he has never smoked. He has never been exposed to tobacco smoke. He has never used smokeless tobacco. He reports that he does not drink alcohol and does not use drugs.  He family history is not on file.    Review of patient's allergies indicates:   Allergen Reactions    Mushroom Swelling     Tongue swells        Tobacco Use: Low Risk  (2/21/2024)    Patient History     Smoking Tobacco Use: Never     Smokeless Tobacco Use: Never     Passive Exposure: Never     Physical Examination  General appearance: alert, cooperative, no  "distress  Neck: no thyromegaly, no neck stiffness  Lungs: clear to auscultation, no wheezes, rales or rhonchi, symmetric air entry  Heart: normal rate, regular rhythm, normal S1, S2, no murmurs, rubs, clicks or gallops  Abdomen: soft, nontender, nondistended, no rigidity, rebound, or guarding. Port in place right upper chest no drainage   Back: no point tenderness over spine  Extremities: peripheral pulses normal, no unilateral leg swelling or calf tenderness   Neurological:alert, oriented, normal speech, no new focal findings or movement disorder noted from baseline    BP Readings from Last 3 Encounters:   02/21/24 (!) 150/100   09/28/23 111/68   07/25/23 98/72     Wt Readings from Last 3 Encounters:   02/21/24 136 kg (299 lb 13.2 oz)   09/28/23 133.7 kg (294 lb 10.3 oz)   07/17/23 127.5 kg (281 lb)     BP (!) 150/100 (BP Location: Left arm, Patient Position: Sitting, BP Method: Large (Manual))   Pulse 75   Temp 98.3 °F (36.8 °C) (Oral)   Resp 18   Ht 6' 2" (1.88 m)   Wt 136 kg (299 lb 13.2 oz)   SpO2 98%   BMI 38.50 kg/m²    274}  Laboratory: I have reviewed old labs below:    274}    Lab Results   Component Value Date    WBC 7.69 06/28/2023    HGB 11.8 (L) 06/28/2023    HCT 37.7 (L) 06/28/2023    MCV 95 06/28/2023     06/28/2023     06/28/2023    K 4.8 06/28/2023    CL 99 06/28/2023    CALCIUM 10.5 06/28/2023    PHOS 5.4 (H) 06/28/2023    CO2 27 06/28/2023     (H) 06/28/2023    BUN 37 (H) 06/28/2023    CREATININE 11.4 (H) 06/28/2023    EGFRNORACEVR 5.2 (A) 06/28/2023    ANIONGAP 13 06/28/2023    PROT 7.6 06/21/2023    ALBUMIN 4.0 06/28/2023    BILITOT 0.6 06/21/2023    ALKPHOS 41 (L) 06/21/2023    ALT 10 06/21/2023    AST 7 (L) 06/21/2023    INR 1.0 02/02/2023    CHOL 141 10/11/2022    TRIG 66 10/11/2022    HDL 39 (L) 10/11/2022    LDLCALC 88.8 10/11/2022    TSH 1.699 10/10/2022    PSA 0.36 01/10/2023    HGBA1C 5.5 06/22/2023      Lab reviewed by me: Particular labs of significance that " "I will monitor, workup, or treat to improve are mentioned below in diagnostic impression remarks.    Imaging/EKG: I have reviewed the pertinent results and my findings are noted in remarks.  274}    CC:   Chief Complaint   Patient presents with    Follow-up    Recurrent Skin Infections        274}    Assessment/Plan  João Ham is a 43 y.o. male who presents to clinic with:  1. Healthcare maintenance    2. ESRD (end stage renal disease)    3. Resistant hypertension    4. Chronic heart failure with preserved ejection fraction    5. Dependence on renal dialysis    6. Severe obesity with body mass index (BMI) of 35.0 to 39.9 with comorbidity    7. Hyperparathyroidism    8. Staph infection    9. Right-sided chest pain    10. Pulmonary hypertension       274}  Diagnostic Impression Remarks + HPI     Documentation entered by me for this encounter may have been done in part using speech-recognition technology. Although I have made an effort to ensure accuracy, "sound like" errors may exist and should be interpreted in context.       Port pain- patient reports that he has had get pain ever since his port was placed right at the insertion site no fevers or drainage he reports that he was told he had a staph infection was given IV vancomycin through his dialysis through his nephrologists this no fevers still has significant pain and he was concerned about this does dialysis today.  Do not see any drainage or fevers recommend to see a general surgeon about the reports along with a infectious disease doctors he has not appear septic at this time does not appear to have an abscess on exam today port in place with no erythema around the site.   Will send in some temporary medications       ESRD stable Dialysis follow-up with Nephrology monitor blood work     Right-sided chest pain- pain at the insertion of the port no shortness a breath no fever no cough    Hypertension stable continue current meds monitor no headache or chest " pain f/u blood work He did take his medications later today will monitor his blood pressure is in pain as well     CHF-stable continue current meds euvolemic no dyspnea monitor  Severe obesity with comorbidity-Hypertension to which the severe obesity is a contributing factor. This is consistent with the definition of severe obesity with comorbidity. The patient's severe obesity was monitored, evaluated, addressed and/or treated. Diet and exercise recommended see counseling section for further info.   Hyperparathyroidism-stable monitor lytes vit d levels f/u with endo      Dependence on dialysis-continue dialysis monitor       Altogether 42 minutes of total time spent on the encounter, which includes face to face time and non-face to face time preparing to see the patient (eg, review of tests), Obtaining and/or reviewing separately obtained history, Documenting clinical information in the electronic or other health record, Independently interpreting results (not separately reported) and communicating results to the patient/family/caregiver, or Care coordination (not separately reported). I also counseled him on common and the most usual side effect of prescribed medications. Risk and benefits of the medication were also discussed. I reviewed previous notes to better coordinate patient's care. He test results and lifestyle changes were also discussed. All questions were answered, and patient voiced understanding.       Additional workup planned: see labs ordered below.    See below for labs and meds ordered with associated diagnosis      1. Healthcare maintenance    2. ESRD (end stage renal disease)    3. Resistant hypertension    4. Chronic heart failure with preserved ejection fraction    5. Dependence on renal dialysis    6. Severe obesity with body mass index (BMI) of 35.0 to 39.9 with comorbidity    7. Hyperparathyroidism    8. Staph infection  - Ambulatory referral/consult to Infectious Disease; Future    9.  Right-sided chest pain  - Ambulatory referral/consult to General Surgery; Future  - X-Ray Chest PA And Lateral; Future  - gabapentin (NEURONTIN) 100 MG capsule; Take 1 capsule (100 mg total) by mouth 3 (three) times daily.  Dispense: 90 capsule; Refill: 0    10. Pulmonary hypertension      Dawson Granado MD   274}    If you are due for any health screening(s) below please notify me so we can arrange them to be ordered and scheduled. Most healthy patients at your age complete them, but you are free to accept or refuse.     If you can't do it, I'll definitely understand. If you can, I'd certainly appreciate it!   All of your core healthy metrics are met.

## 2024-02-21 NOTE — TELEPHONE ENCOUNTER
----- Message from Lynne Freire sent at 2/21/2024  9:21 AM CST -----  Type:  Needs Medical Advice    Who Called: angel morrissey on [facility] Call Back Number: [phone]  Additional Information:  Requesting call back  requesting dx code on HYDROcodone-acetaminophen (NORCO) 5-325 mg per tablet    please advise thank you

## 2024-02-27 ENCOUNTER — TELEPHONE (OUTPATIENT)
Dept: INFECTIOUS DISEASES | Facility: CLINIC | Age: 44
End: 2024-02-27
Payer: COMMERCIAL

## 2024-02-27 ENCOUNTER — LAB VISIT (OUTPATIENT)
Dept: LAB | Facility: HOSPITAL | Age: 44
End: 2024-02-27
Payer: COMMERCIAL

## 2024-02-27 ENCOUNTER — OFFICE VISIT (OUTPATIENT)
Dept: INFECTIOUS DISEASES | Facility: CLINIC | Age: 44
End: 2024-02-27
Payer: COMMERCIAL

## 2024-02-27 VITALS
WEIGHT: 293.19 LBS | TEMPERATURE: 98 F | HEART RATE: 90 BPM | DIASTOLIC BLOOD PRESSURE: 85 MMHG | HEIGHT: 74 IN | BODY MASS INDEX: 37.63 KG/M2 | SYSTOLIC BLOOD PRESSURE: 131 MMHG

## 2024-02-27 DIAGNOSIS — B95.8 STAPH INFECTION: ICD-10-CM

## 2024-02-27 PROCEDURE — 3008F BODY MASS INDEX DOCD: CPT | Mod: CPTII,NTX,S$GLB, | Performed by: PHYSICIAN ASSISTANT

## 2024-02-27 PROCEDURE — 87040 BLOOD CULTURE FOR BACTERIA: CPT | Mod: 59,NTX | Performed by: PHYSICIAN ASSISTANT

## 2024-02-27 PROCEDURE — 36415 COLL VENOUS BLD VENIPUNCTURE: CPT | Mod: TXP | Performed by: PHYSICIAN ASSISTANT

## 2024-02-27 PROCEDURE — 3079F DIAST BP 80-89 MM HG: CPT | Mod: CPTII,NTX,S$GLB, | Performed by: PHYSICIAN ASSISTANT

## 2024-02-27 PROCEDURE — 99999 PR PBB SHADOW E&M-EST. PATIENT-LVL IV: CPT | Mod: PBBFAC,TXP,, | Performed by: PHYSICIAN ASSISTANT

## 2024-02-27 PROCEDURE — 3075F SYST BP GE 130 - 139MM HG: CPT | Mod: CPTII,NTX,S$GLB, | Performed by: PHYSICIAN ASSISTANT

## 2024-02-27 PROCEDURE — 1159F MED LIST DOCD IN RCRD: CPT | Mod: CPTII,NTX,S$GLB, | Performed by: PHYSICIAN ASSISTANT

## 2024-02-27 PROCEDURE — 99214 OFFICE O/P EST MOD 30 MIN: CPT | Mod: NTX,S$GLB,, | Performed by: PHYSICIAN ASSISTANT

## 2024-02-27 RX ORDER — OXYCODONE AND ACETAMINOPHEN 5; 325 MG/1; MG/1
1 TABLET ORAL EVERY 6 HOURS PRN
COMMUNITY
Start: 2024-02-24

## 2024-02-27 NOTE — PROGRESS NOTES
Subjective     Patient ID: João Ham is a 43 y.o. male.    Chief Complaint: Infection    HPI      44 y/o male with HTN, ESRD on HD via TLC presents to ID clinic for tunneled line catheter pain and catheter site infection with Staph. He is currently receiving vancomycin via tunneled line catheter after his HD. He received 4 doses so far, last dose was yesterday. He dialyzes [facility]. He denies any recent fever chills or night sweats.     He reports onset of symptoms started about 2 weeks ago. Noted to have some purulent drainage from the site. This was cultures at dialysis clinic. He reports persistent pain and tenderness from tunneled catheter. He recently had LUE AVF placed in one week ago. No issues with LUE AVF.       Review of Systems   Constitutional:  Negative for activity change, appetite change, chills, diaphoresis, fatigue and fever.   Respiratory:  Negative for cough and shortness of breath.    Gastrointestinal:  Negative for abdominal pain, diarrhea, nausea and vomiting.   Genitourinary:  Negative for dysuria.   Musculoskeletal:  Positive for myalgias. Negative for arthralgias, back pain and joint swelling.        +chest tenderness from catheter site   Integumentary:  Positive for rash and wound.   Neurological:  Negative for dizziness and headaches.          Objective     Physical Exam  Vitals and nursing note reviewed.   Constitutional:       General: He is not in acute distress.     Appearance: He is well-developed. He is not diaphoretic.       HENT:      Head: Normocephalic and atraumatic.   Eyes:      Pupils: Pupils are equal, round, and reactive to light.   Cardiovascular:      Rate and Rhythm: Normal rate and regular rhythm.      Heart sounds: Normal heart sounds. No murmur heard.     No friction rub. No gallop.   Pulmonary:      Effort: Pulmonary effort is normal. No respiratory distress.      Breath sounds: Normal breath sounds. No wheezing or rales.   Chest:      Chest wall: No tenderness.    Abdominal:      General: Bowel sounds are normal. There is no distension.      Palpations: There is no mass.      Tenderness: There is no abdominal tenderness. There is no guarding.   Musculoskeletal:         General: No deformity. Normal range of motion.      Cervical back: Normal range of motion and neck supple.   Skin:     General: Skin is warm and dry.      Findings: No erythema or rash.   Neurological:      Mental Status: He is alert and oriented to person, place, and time.   Psychiatric:         Behavior: Behavior normal.         Thought Content: Thought content normal.         Judgment: Judgment normal.                Assessment and Plan     1. Staph infection  -     Ambulatory referral/consult to Infectious Disease  -     CULTURE, BLOOD; Future; Expected date: 02/27/2024  -     Blood culture; Future; Expected date: 02/27/2024        Blood cultures x 2 today   Ideally would recommend catheter removal or exchange as with concerns of possibly infected catheter if with hx of staph infection from TLC site. Encouraged pt to go to ED if with fevers, chills, night sweats, worsening pain, erythema, swelling, or drainage. Would obtain imaging to r/o abscess if with drainage or increase swelling.   Continue local wound care  Will call [facility]'s for cultures and treatment plan. ?vancomycin ? Duration?  Per patient, pt goes to [facility]'s on [facility]. Will f/u       All questions answered. Pt verbalized understanding    >35 minutes of total time spent on the encounter, which includes face to face time and non-face to face time preparing to see the patient (eg, review of tests), Obtaining and/or reviewing separately obtained history, Documenting clinical information in the electronic or other health record, Independently interpreting results (not separately reported) and communicating results to the patient/family/caregiver, or Care coordination (not separately reported).              No follow-ups on file.

## 2024-02-27 NOTE — TELEPHONE ENCOUNTER
----- Message from Terianil Burgoslucian sent at 2/27/2024  8:37 AM CST -----  Regarding: advise  Contact: patient  Type: Needs Medical Advice  Who Called:  patient   Symptoms (please be specific):    How long has patient had these symptoms:    Pharmacy name and phone #:    Best Call Back Number: [phone]  Additional Information: [  johanne cota pt states he lost your office on a call and would like to speak with you guys again are u available MRN: [id number] wendi escamilla  he will be seen today needs a call back as soon as possible

## 2024-02-28 ENCOUNTER — PATIENT MESSAGE (OUTPATIENT)
Dept: FAMILY MEDICINE | Facility: CLINIC | Age: 44
End: 2024-02-28
Payer: COMMERCIAL

## 2024-02-28 ENCOUNTER — PATIENT MESSAGE (OUTPATIENT)
Dept: INFECTIOUS DISEASES | Facility: CLINIC | Age: 44
End: 2024-02-28
Payer: COMMERCIAL

## 2024-02-29 LAB — HPRA INTERPRETATION: NORMAL

## 2024-03-01 NOTE — TELEPHONE ENCOUNTER
Pulm referral scheduled with patient: 12/13/2022 1300 Carisa Pedroza NP [facility].   no

## 2024-03-03 LAB
BACTERIA BLD CULT: NORMAL
BACTERIA BLD CULT: NORMAL

## 2024-03-07 ENCOUNTER — LAB VISIT (OUTPATIENT)
Dept: LAB | Facility: HOSPITAL | Age: 44
End: 2024-03-07
Payer: COMMERCIAL

## 2024-03-07 DIAGNOSIS — Z76.82 ORGAN TRANSPLANT CANDIDATE: ICD-10-CM

## 2024-03-07 PROCEDURE — 99001 SPECIMEN HANDLING PT-LAB: CPT | Mod: PO,TXP | Performed by: NURSE PRACTITIONER

## 2024-03-21 DIAGNOSIS — Z76.82 ORGAN TRANSPLANT CANDIDATE: Primary | ICD-10-CM

## 2024-03-21 NOTE — PROGRESS NOTES
YEARLY LIST MANAGEMENT NOTE    João Ham's kidney transplant listing status reviewed.  Patient is due for follow-up appointments in June 2024.  Appointments will be scheduled per protocol.  HLA sample is up to date and being received on a regular basis.

## 2024-03-22 ENCOUNTER — TELEPHONE (OUTPATIENT)
Dept: TRANSPLANT | Facility: CLINIC | Age: 44
End: 2024-03-22
Payer: COMMERCIAL

## 2024-04-01 PROCEDURE — 86832 HLA CLASS I HIGH DEFIN QUAL: CPT | Mod: PO,TXP | Performed by: NURSE PRACTITIONER

## 2024-04-01 PROCEDURE — 86833 HLA CLASS II HIGH DEFIN QUAL: CPT | Mod: PO,TXP | Performed by: NURSE PRACTITIONER

## 2024-04-04 ENCOUNTER — LAB VISIT (OUTPATIENT)
Dept: LAB | Facility: HOSPITAL | Age: 44
End: 2024-04-04
Payer: COMMERCIAL

## 2024-04-04 DIAGNOSIS — Z76.82 ORGAN TRANSPLANT CANDIDATE: ICD-10-CM

## 2024-04-09 ENCOUNTER — PATIENT MESSAGE (OUTPATIENT)
Dept: PULMONOLOGY | Facility: CLINIC | Age: 44
End: 2024-04-09
Payer: COMMERCIAL

## 2024-04-09 LAB — HPRA INTERPRETATION: NORMAL

## 2024-04-13 NOTE — TELEPHONE ENCOUNTER
No care due was identified.  [facility] Embedded Care Due Messages. Reference number: [id number].   4/13/2024 5:03:29 PM CDT

## 2024-04-15 RX ORDER — CLONIDINE HYDROCHLORIDE 0.3 MG/1
0.3 TABLET ORAL 3 TIMES DAILY
Qty: 270 TABLET | Refills: 2 | Status: SHIPPED | OUTPATIENT
Start: 2024-04-15

## 2024-04-30 ENCOUNTER — HOSPITAL ENCOUNTER (OUTPATIENT)
Facility: HOSPITAL | Age: 44
Discharge: HOME | End: 2024-04-30
Attending: SURGERY | Admitting: SURGERY
Payer: COMMERCIAL

## 2024-04-30 VITALS
OXYGEN SATURATION: 99 % | HEIGHT: 74 IN | DIASTOLIC BLOOD PRESSURE: 89 MMHG | RESPIRATION RATE: 13 BRPM | BODY MASS INDEX: 36.57 KG/M2 | WEIGHT: 285 LBS | SYSTOLIC BLOOD PRESSURE: 147 MMHG | HEART RATE: 63 BPM

## 2024-04-30 DIAGNOSIS — N18.6 ESRD (END STAGE RENAL DISEASE): Primary | ICD-10-CM

## 2024-04-30 DIAGNOSIS — N18.6 END STAGE RENAL DISEASE: ICD-10-CM

## 2024-04-30 LAB
ALBUMIN SERPL BCP-MCNC: 4.3 G/DL (ref 3.5–5.2)
ALP SERPL-CCNC: 53 U/L (ref 55–135)
ALT SERPL W/O P-5'-P-CCNC: 17 U/L (ref 10–44)
ANION GAP SERPL CALC-SCNC: 13 MMOL/L (ref 8–16)
AST SERPL-CCNC: 21 U/L (ref 10–40)
BASOPHILS # BLD AUTO: 0.01 K/UL (ref 0–0.2)
BASOPHILS NFR BLD: 0.2 % (ref 0–1.9)
BILIRUB SERPL-MCNC: 0.3 MG/DL (ref 0.1–1)
BUN SERPL-MCNC: 33 MG/DL (ref 6–20)
CALCIUM SERPL-MCNC: 8.4 MG/DL (ref 8.7–10.5)
CHLORIDE SERPL-SCNC: 99 MMOL/L (ref 95–110)
CO2 SERPL-SCNC: 28 MMOL/L (ref 23–29)
CREAT SERPL-MCNC: 10.4 MG/DL (ref 0.5–1.4)
DIFFERENTIAL METHOD BLD: ABNORMAL
EOSINOPHIL # BLD AUTO: 0.1 K/UL (ref 0–0.5)
EOSINOPHIL NFR BLD: 1.6 % (ref 0–8)
ERYTHROCYTE [DISTWIDTH] IN BLOOD BY AUTOMATED COUNT: 15.9 % (ref 11.5–14.5)
EST. GFR  (NO RACE VARIABLE): 5.8 ML/MIN/1.73 M^2
GLUCOSE SERPL-MCNC: 95 MG/DL (ref 70–110)
HCT VFR BLD AUTO: 33.4 % (ref 40–54)
HGB BLD-MCNC: 10.9 G/DL (ref 14–18)
IMM GRANULOCYTES # BLD AUTO: 0.01 K/UL (ref 0–0.04)
IMM GRANULOCYTES NFR BLD AUTO: 0.2 % (ref 0–0.5)
LYMPHOCYTES # BLD AUTO: 1.3 K/UL (ref 1–4.8)
LYMPHOCYTES NFR BLD: 25.2 % (ref 18–48)
MCH RBC QN AUTO: 32.1 PG (ref 27–31)
MCHC RBC AUTO-ENTMCNC: 32.6 G/DL (ref 32–36)
MCV RBC AUTO: 98 FL (ref 82–98)
MONOCYTES # BLD AUTO: 0.6 K/UL (ref 0.3–1)
MONOCYTES NFR BLD: 12.3 % (ref 4–15)
NEUTROPHILS # BLD AUTO: 3 K/UL (ref 1.8–7.7)
NEUTROPHILS NFR BLD: 60.5 % (ref 38–73)
NRBC BLD-RTO: 0 /100 WBC
PLATELET # BLD AUTO: 218 K/UL (ref 150–450)
PMV BLD AUTO: 10.1 FL (ref 9.2–12.9)
POTASSIUM SERPL-SCNC: 4.5 MMOL/L (ref 3.5–5.1)
PROT SERPL-MCNC: 8 G/DL (ref 6–8.4)
RBC # BLD AUTO: 3.4 M/UL (ref 4.6–6.2)
SODIUM SERPL-SCNC: 140 MMOL/L (ref 136–145)
WBC # BLD AUTO: 4.97 K/UL (ref 3.9–12.7)

## 2024-04-30 PROCEDURE — 85025 COMPLETE CBC W/AUTO DIFF WBC: CPT | Performed by: SURGERY

## 2024-04-30 PROCEDURE — C1769 GUIDE WIRE: HCPCS | Performed by: SURGERY

## 2024-04-30 PROCEDURE — 99153 MOD SED SAME PHYS/QHP EA: CPT | Performed by: SURGERY

## 2024-04-30 PROCEDURE — 36902 INTRO CATH DIALYSIS CIRCUIT: CPT | Mod: LT | Performed by: SURGERY

## 2024-04-30 PROCEDURE — 99152 MOD SED SAME PHYS/QHP 5/>YRS: CPT | Performed by: SURGERY

## 2024-04-30 PROCEDURE — 63600175 PHARM REV CODE 636 W HCPCS: Performed by: SURGERY

## 2024-04-30 PROCEDURE — 25500020 PHARM REV CODE 255: Performed by: SURGERY

## 2024-04-30 PROCEDURE — 25000003 PHARM REV CODE 250: Performed by: SURGERY

## 2024-04-30 PROCEDURE — 80053 COMPREHEN METABOLIC PANEL: CPT | Performed by: SURGERY

## 2024-04-30 PROCEDURE — C1894 INTRO/SHEATH, NON-LASER: HCPCS | Performed by: SURGERY

## 2024-04-30 PROCEDURE — C1725 CATH, TRANSLUMIN NON-LASER: HCPCS | Performed by: SURGERY

## 2024-04-30 RX ORDER — CEFAZOLIN SODIUM 1 G/3ML
2 INJECTION, POWDER, FOR SOLUTION INTRAMUSCULAR; INTRAVENOUS
Status: DISCONTINUED | OUTPATIENT
Start: 2024-04-30 | End: 2024-04-30 | Stop reason: HOSPADM

## 2024-04-30 RX ORDER — LIDOCAINE HYDROCHLORIDE 10 MG/ML
INJECTION, SOLUTION EPIDURAL; INFILTRATION; INTRACAUDAL; PERINEURAL
Status: DISCONTINUED | OUTPATIENT
Start: 2024-04-30 | End: 2024-04-30 | Stop reason: HOSPADM

## 2024-04-30 RX ORDER — FENTANYL CITRATE 50 UG/ML
INJECTION, SOLUTION INTRAMUSCULAR; INTRAVENOUS
Status: DISCONTINUED | OUTPATIENT
Start: 2024-04-30 | End: 2024-04-30 | Stop reason: HOSPADM

## 2024-04-30 RX ORDER — MIDAZOLAM HYDROCHLORIDE 1 MG/ML
INJECTION INTRAMUSCULAR; INTRAVENOUS
Status: DISCONTINUED | OUTPATIENT
Start: 2024-04-30 | End: 2024-04-30 | Stop reason: HOSPADM

## 2024-04-30 RX ORDER — IODIXANOL 320 MG/ML
INJECTION, SOLUTION INTRAVASCULAR
Status: DISCONTINUED | OUTPATIENT
Start: 2024-04-30 | End: 2024-04-30 | Stop reason: HOSPADM

## 2024-04-30 RX ORDER — NITROGLYCERIN 5 MG/ML
INJECTION, SOLUTION INTRAVENOUS
Status: DISCONTINUED | OUTPATIENT
Start: 2024-04-30 | End: 2024-04-30 | Stop reason: HOSPADM

## 2024-04-30 NOTE — OP NOTE
[facility]  Vascular Surgery  Operative Note    SUMMARY     Date of Procedure: 4/30/2024     Procedure:   Left arm fistulogram, angioplasty    Surgeons and Role:     * Khoobehi, Ali, MD - Primary    Assisting Surgeon: None    Pre-Operative Diagnosis: End stage renal disease [N18.6]    Post-Operative Diagnosis: Post-Op Diagnosis Codes:     * End stage renal disease [N18.6]    Anesthesia: RN IV Sedation    Operative Findings (including complications, if any):  Severe stenosis mid AV fistula    Description of Technical Procedures:   Indications, risks, benefits of left arm fistulogram with possible angioplasty were discussed with the patient. Risks discussed included possible bleeding, access failure, infection, cardiac arrest, need for future interventions, and death. Patient was agreeable for the procedure and signed consent.    Under my direct supervision, moderate sedation was administered during the course of the procedure with Versed (1 mg) and Fentanyl (100 mcgs). An independent observer was present throughout the procedure, there was continuous monitoring of cardiac telemetry, hemodynamics and pulse oximetry. I was personally present and spent 30 minutes face to face time during sedation administration.    6F sheath was placed in a retrograde fashion.  Fistulogram showed moderate stenosis of 50% at the arterial anastomosis.  This was treated with a 5 mm balloon, with improvement.  Sheath was removed.    6F sheath was placed in an antegrade fashion. Fistulogram was performed which demonstrated severe long segment stenosis proximally of approximately 70%. Angioplasty was performed with a 6 millimeter balloon. Completion fistulogram demonstrated resolution of the stenosis.     Sheath was removed and puncture site repaired with 4-0 Vicryl suture. Patient was brought out of the procedure room in stable condition.    Significant Surgical Tasks Conducted by the Assistant(s), if Applicable:  n/a    Estimated Blood Loss (EBL): * No values recorded between 4/30/2024  1:12 PM and 4/30/2024  2:16 PM *           Implants: * No implants in log *    Specimens:   Specimen (24h ago, onward)      None                    Condition: Good    Disposition: PACU - hemodynamically stable.    Attestation: I performed the procedure.

## 2024-04-30 NOTE — Clinical Note
The site was marked. The left radial and left brachial was prepped. The site was prepped with ChloraPrep. The site was clipped. The patient was draped.

## 2024-04-30 NOTE — DISCHARGE SUMMARY
[facility]  Discharge Note  Short Stay    Procedure(s) (LRB):  Fistulogram (Bilateral)  PTA, AV FISTULA (Left)      OUTCOME: Patient tolerated treatment/procedure well without complication and is now ready for discharge.    DISPOSITION: Home or Self Care    FINAL DIAGNOSIS:  AVF stenosis    FOLLOWUP: In clinic, 3 weeks    DISCHARGE INSTRUCTIONS:    Discharge Procedure Orders   Remove dressing in 24 hours     Activity as tolerated        TIME SPENT ON DISCHARGE: 15 minutes

## 2024-04-30 NOTE — DISCHARGE INSTRUCTIONS
Leave drsg on till you follow up with Dialysis  No driving for 24 hours  Watch site for any swelling or bleeding or signs of infection.

## 2024-05-06 PROCEDURE — 99001 SPECIMEN HANDLING PT-LAB: CPT | Mod: TXP | Performed by: NURSE PRACTITIONER

## 2024-05-08 ENCOUNTER — LAB VISIT (OUTPATIENT)
Dept: LAB | Facility: HOSPITAL | Age: 44
End: 2024-05-08
Payer: COMMERCIAL

## 2024-05-08 DIAGNOSIS — Z76.82 ORGAN TRANSPLANT CANDIDATE: ICD-10-CM

## 2024-06-03 PROCEDURE — 86832 HLA CLASS I HIGH DEFIN QUAL: CPT | Mod: TXP | Performed by: NURSE PRACTITIONER

## 2024-06-03 PROCEDURE — 86833 HLA CLASS II HIGH DEFIN QUAL: CPT | Mod: TXP | Performed by: NURSE PRACTITIONER

## 2024-06-05 ENCOUNTER — LAB VISIT (OUTPATIENT)
Dept: LAB | Facility: HOSPITAL | Age: 44
End: 2024-06-05
Payer: COMMERCIAL

## 2024-06-05 DIAGNOSIS — Z76.82 ORGAN TRANSPLANT CANDIDATE: ICD-10-CM

## 2024-06-10 LAB — HPRA INTERPRETATION: NORMAL

## 2024-06-21 ENCOUNTER — TELEPHONE (OUTPATIENT)
Dept: TRANSPLANT | Facility: CLINIC | Age: 44
End: 2024-06-21
Payer: COMMERCIAL

## 2024-06-21 NOTE — TELEPHONE ENCOUNTER
Spoke to pt confirming scheduled clinic visit appt on 07/02/2024. Appt reminder mailed and pt is aware to bring care giver.

## 2024-06-25 ENCOUNTER — HOSPITAL ENCOUNTER (OUTPATIENT)
Dept: RADIOLOGY | Facility: HOSPITAL | Age: 44
Discharge: HOME | End: 2024-06-25
Attending: NURSE PRACTITIONER
Payer: COMMERCIAL

## 2024-06-25 ENCOUNTER — HOSPITAL ENCOUNTER (OUTPATIENT)
Dept: CARDIOLOGY | Facility: HOSPITAL | Age: 44
Discharge: HOME | End: 2024-06-25
Attending: NURSE PRACTITIONER
Payer: COMMERCIAL

## 2024-06-25 VITALS
HEIGHT: 74 IN | BODY MASS INDEX: 36.58 KG/M2 | WEIGHT: 285.06 LBS | HEART RATE: 90 BPM | SYSTOLIC BLOOD PRESSURE: 138 MMHG | DIASTOLIC BLOOD PRESSURE: 86 MMHG

## 2024-06-25 DIAGNOSIS — Z76.82 ORGAN TRANSPLANT CANDIDATE: ICD-10-CM

## 2024-06-25 LAB
ASCENDING AORTA: 3.28 CM
AV INDEX (PROSTH): 0.97
AV MEAN GRADIENT: 5 MMHG
AV PEAK GRADIENT: 9 MMHG
AV VALVE AREA BY VELOCITY RATIO: 4.18 CM²
AV VALVE AREA: 5.08 CM²
AV VELOCITY RATIO: 0.79
BSA FOR ECHO PROCEDURE: 2.6 M2
CV ECHO LV RWT: 0.67 CM
DOP CALC AO PEAK VEL: 1.46 M/S
DOP CALC AO VTI: 22.84 CM
DOP CALC LVOT AREA: 5.3 CM2
DOP CALC LVOT DIAMETER: 2.59 CM
DOP CALC LVOT PEAK VEL: 1.16 M/S
DOP CALC LVOT STROKE VOLUME: 116.11 CM3
DOP CALCLVOT PEAK VEL VTI: 22.05 CM
E WAVE DECELERATION TIME: 237.21 MSEC
E/A RATIO: 0.65
E/E' RATIO: 5.5 M/S
ECHO LV POSTERIOR WALL: 1.4 CM (ref 0.6–1.1)
EJECTION FRACTION: 68 %
FRACTIONAL SHORTENING: 36 % (ref 28–44)
INTERVENTRICULAR SEPTUM: 1.16 CM (ref 0.6–1.1)
IVRT: 82.78 MSEC
LA MAJOR: 5.69 CM
LA MINOR: 5.66 CM
LA WIDTH: 3.61 CM
LEFT ATRIUM SIZE: 3.98 CM
LEFT ATRIUM VOLUME INDEX MOD: 18.3 ML/M2
LEFT ATRIUM VOLUME INDEX: 27.4 ML/M2
LEFT ATRIUM VOLUME MOD: 46.32 CM3
LEFT ATRIUM VOLUME: 69.31 CM3
LEFT INTERNAL DIMENSION IN SYSTOLE: 2.67 CM (ref 2.1–4)
LEFT VENTRICLE DIASTOLIC VOLUME INDEX: 30.26 ML/M2
LEFT VENTRICLE DIASTOLIC VOLUME: 76.55 ML
LEFT VENTRICLE MASS INDEX: 76 G/M2
LEFT VENTRICLE SYSTOLIC VOLUME INDEX: 10.4 ML/M2
LEFT VENTRICLE SYSTOLIC VOLUME: 26.19 ML
LEFT VENTRICULAR INTERNAL DIMENSION IN DIASTOLE: 4.15 CM (ref 3.5–6)
LEFT VENTRICULAR MASS: 192.49 G
LV LATERAL E/E' RATIO: 4.4 M/S
LV SEPTAL E/E' RATIO: 7.33 M/S
MV A" WAVE DURATION": 8.56 MSEC
MV PEAK A VEL: 0.68 M/S
MV PEAK E VEL: 0.44 M/S
MV STENOSIS PRESSURE HALF TIME: 68.79 MS
MV VALVE AREA P 1/2 METHOD: 3.2 CM2
PISA TR MAX VEL: 2.18 M/S
PULM VEIN S/D RATIO: 2.37
PV PEAK D VEL: 0.27 M/S
PV PEAK S VEL: 0.64 M/S
RA MAJOR: 5.13 CM
RA PRESSURE ESTIMATED: 3 MMHG
RA WIDTH: 3.5 CM
RIGHT VENTRICLE DIASTOLIC BASEL DIMENSION: 3.5 CM
RV TB RVSP: 5 MMHG
SINUS: 3.56 CM
STJ: 3.16 CM
TDI LATERAL: 0.1 M/S
TDI SEPTAL: 0.06 M/S
TDI: 0.08 M/S
TR MAX PG: 19 MMHG
TRICUSPID ANNULAR PLANE SYSTOLIC EXCURSION: 1.59 CM
TV REST PULMONARY ARTERY PRESSURE: 22 MMHG
Z-SCORE OF LEFT VENTRICULAR DIMENSION IN END DIASTOLE: -12.91
Z-SCORE OF LEFT VENTRICULAR DIMENSION IN END SYSTOLE: -9.54

## 2024-06-25 PROCEDURE — 93306 TTE W/DOPPLER COMPLETE: CPT | Mod: 26,TXP,, | Performed by: INTERNAL MEDICINE

## 2024-06-25 PROCEDURE — 93978 VASCULAR STUDY: CPT | Mod: TC,TXP

## 2024-06-25 PROCEDURE — 93306 TTE W/DOPPLER COMPLETE: CPT | Mod: TXP

## 2024-06-25 PROCEDURE — 76770 US EXAM ABDO BACK WALL COMP: CPT | Mod: TC,TXP

## 2024-06-28 ENCOUNTER — TELEPHONE (OUTPATIENT)
Dept: TRANSPLANT | Facility: CLINIC | Age: 44
End: 2024-06-28
Payer: COMMERCIAL

## 2024-07-01 ENCOUNTER — TELEPHONE (OUTPATIENT)
Dept: TRANSPLANT | Facility: CLINIC | Age: 44
End: 2024-07-01
Payer: COMMERCIAL

## 2024-07-01 PROCEDURE — 99001 SPECIMEN HANDLING PT-LAB: CPT | Mod: TXP | Performed by: NURSE PRACTITIONER

## 2024-07-01 NOTE — LETTER
2024    João Ham  [address] [facility] [address]        Dear João Ham:  MRN: [id number]  : 1980    You are scheduled on 2024 for follow up in the transplant clinic to update your records and evaluate your health status as you wait for a transplant.  It is very important that we ensure you are ready for your transplant.      Please make arrangements to be in our transplant clinic from 12:30 pm- 4 pm.  During this time you will watch an educational video and then be seen by our transplant , financial counselor, and advance practice provider.     If you have had a change in your medical history since your last visit, please call your transplant coordinator to ensure we have the medical records to review prior to your appointment.     Please bring the following with you to this appointment:  A Caregiver is REQUIRED  Bring ALL insurance information including medication card  Current list of medications  Copies of any outside medical records from the past year, such as: mammogram, pap smear, ultrasound, CAT scan, colonoscopy, cardiac angiogram or cardiac stress test    To reschedule your appointments please call [phone] or [phone] ([phone]. [phone]). Please ask for the .      Failure to cancel in advance or arrive on time could result in a $50 cancellation fee.  Your transplant candidacy could be affected by no showing these appointments.  We look forward to seeing you.    Sincerely,    [facility]  [facility] [place] [address]  [phone]

## 2024-07-03 ENCOUNTER — TELEPHONE (OUTPATIENT)
Dept: VASCULAR SURGERY | Facility: CLINIC | Age: 44
End: 2024-07-03
Payer: COMMERCIAL

## 2024-07-03 NOTE — TELEPHONE ENCOUNTER
Patient declines to be scheduled after being referred since he is unsure of when he will receive the transplant.

## 2024-07-06 ENCOUNTER — LAB VISIT (OUTPATIENT)
Dept: LAB | Facility: HOSPITAL | Age: 44
End: 2024-07-06
Payer: COMMERCIAL

## 2024-07-06 DIAGNOSIS — Z76.82 ORGAN TRANSPLANT CANDIDATE: ICD-10-CM

## 2024-07-15 ENCOUNTER — TELEPHONE (OUTPATIENT)
Dept: TRANSPLANT | Facility: CLINIC | Age: 44
End: 2024-07-15
Payer: COMMERCIAL

## 2024-07-15 ENCOUNTER — HOSPITAL ENCOUNTER (INPATIENT)
Facility: HOSPITAL | Age: 44
LOS: 3 days | Discharge: HOME | DRG: 314 | End: 2024-07-19
Attending: EMERGENCY MEDICINE | Admitting: HOSPITALIST
Payer: COMMERCIAL

## 2024-07-15 DIAGNOSIS — R07.9 CHEST PAIN: ICD-10-CM

## 2024-07-15 DIAGNOSIS — A41.9 SEPSIS: Primary | ICD-10-CM

## 2024-07-15 DIAGNOSIS — R78.81 BACTEREMIA: ICD-10-CM

## 2024-07-15 DIAGNOSIS — R78.81 MSSA BACTEREMIA: ICD-10-CM

## 2024-07-15 DIAGNOSIS — R00.0 TACHYCARDIA: ICD-10-CM

## 2024-07-15 DIAGNOSIS — N18.6 ESRD (END STAGE RENAL DISEASE): ICD-10-CM

## 2024-07-15 DIAGNOSIS — B95.61 MSSA BACTEREMIA: ICD-10-CM

## 2024-07-15 LAB
ALBUMIN SERPL BCP-MCNC: 4 G/DL (ref 3.5–5.2)
ALP SERPL-CCNC: 90 U/L (ref 55–135)
ALT SERPL W/O P-5'-P-CCNC: 54 U/L (ref 10–44)
ANION GAP SERPL CALC-SCNC: 15 MMOL/L (ref 8–16)
AST SERPL-CCNC: 58 U/L (ref 10–40)
BASOPHILS # BLD AUTO: 0.01 K/UL (ref 0–0.2)
BASOPHILS NFR BLD: 0.1 % (ref 0–1.9)
BILIRUB SERPL-MCNC: 0.5 MG/DL (ref 0.1–1)
BUN SERPL-MCNC: 22 MG/DL (ref 6–20)
CALCIUM SERPL-MCNC: 8.6 MG/DL (ref 8.7–10.5)
CHLORIDE SERPL-SCNC: 91 MMOL/L (ref 95–110)
CO2 SERPL-SCNC: 28 MMOL/L (ref 23–29)
CREAT SERPL-MCNC: 10.1 MG/DL (ref 0.5–1.4)
DIFFERENTIAL METHOD BLD: ABNORMAL
EOSINOPHIL # BLD AUTO: 0.1 K/UL (ref 0–0.5)
EOSINOPHIL NFR BLD: 0.7 % (ref 0–8)
ERYTHROCYTE [DISTWIDTH] IN BLOOD BY AUTOMATED COUNT: 15.4 % (ref 11.5–14.5)
EST. GFR  (NO RACE VARIABLE): 6 ML/MIN/1.73 M^2
GLUCOSE SERPL-MCNC: 124 MG/DL (ref 70–110)
HCT VFR BLD AUTO: 31.2 % (ref 40–54)
HGB BLD-MCNC: 10.2 G/DL (ref 14–18)
IMM GRANULOCYTES # BLD AUTO: 0.02 K/UL (ref 0–0.04)
IMM GRANULOCYTES NFR BLD AUTO: 0.3 % (ref 0–0.5)
INFLUENZA A, MOLECULAR: NEGATIVE
INFLUENZA B, MOLECULAR: NEGATIVE
LDH SERPL L TO P-CCNC: 1.76 MMOL/L (ref 0.5–2.2)
LYMPHOCYTES # BLD AUTO: 1.2 K/UL (ref 1–4.8)
LYMPHOCYTES NFR BLD: 17.4 % (ref 18–48)
MCH RBC QN AUTO: 32.3 PG (ref 27–31)
MCHC RBC AUTO-ENTMCNC: 32.7 G/DL (ref 32–36)
MCV RBC AUTO: 99 FL (ref 82–98)
MONOCYTES # BLD AUTO: 1.6 K/UL (ref 0.3–1)
MONOCYTES NFR BLD: 23.7 % (ref 4–15)
NEUTROPHILS # BLD AUTO: 3.9 K/UL (ref 1.8–7.7)
NEUTROPHILS NFR BLD: 57.8 % (ref 38–73)
NRBC BLD-RTO: 0 /100 WBC
PLATELET # BLD AUTO: 225 K/UL (ref 150–450)
PMV BLD AUTO: 10.6 FL (ref 9.2–12.9)
POTASSIUM SERPL-SCNC: 3.9 MMOL/L (ref 3.5–5.1)
PROT SERPL-MCNC: 8.7 G/DL (ref 6–8.4)
RBC # BLD AUTO: 3.16 M/UL (ref 4.6–6.2)
SAMPLE: NORMAL
SARS-COV-2 RDRP RESP QL NAA+PROBE: NEGATIVE
SODIUM SERPL-SCNC: 134 MMOL/L (ref 136–145)
SPECIMEN SOURCE: NORMAL
WBC # BLD AUTO: 6.68 K/UL (ref 3.9–12.7)

## 2024-07-15 PROCEDURE — 63600175 PHARM REV CODE 636 W HCPCS: Performed by: EMERGENCY MEDICINE

## 2024-07-15 PROCEDURE — 87502 INFLUENZA DNA AMP PROBE: CPT | Performed by: EMERGENCY MEDICINE

## 2024-07-15 PROCEDURE — G0378 HOSPITAL OBSERVATION PER HR: HCPCS

## 2024-07-15 PROCEDURE — 80053 COMPREHEN METABOLIC PANEL: CPT | Performed by: EMERGENCY MEDICINE

## 2024-07-15 PROCEDURE — U0002 COVID-19 LAB TEST NON-CDC: HCPCS | Performed by: EMERGENCY MEDICINE

## 2024-07-15 PROCEDURE — 96367 TX/PROPH/DG ADDL SEQ IV INF: CPT

## 2024-07-15 PROCEDURE — 99285 EMERGENCY DEPT VISIT HI MDM: CPT | Mod: 25

## 2024-07-15 PROCEDURE — 25000003 PHARM REV CODE 250: Performed by: INTERNAL MEDICINE

## 2024-07-15 PROCEDURE — 85025 COMPLETE CBC W/AUTO DIFF WBC: CPT | Performed by: EMERGENCY MEDICINE

## 2024-07-15 PROCEDURE — 87186 SC STD MICRODIL/AGAR DIL: CPT | Performed by: EMERGENCY MEDICINE

## 2024-07-15 PROCEDURE — 87077 CULTURE AEROBIC IDENTIFY: CPT | Performed by: EMERGENCY MEDICINE

## 2024-07-15 PROCEDURE — 25000003 PHARM REV CODE 250: Performed by: EMERGENCY MEDICINE

## 2024-07-15 PROCEDURE — 36415 COLL VENOUS BLD VENIPUNCTURE: CPT | Performed by: EMERGENCY MEDICINE

## 2024-07-15 PROCEDURE — 87154 CUL TYP ID BLD PTHGN 6+ TRGT: CPT | Performed by: EMERGENCY MEDICINE

## 2024-07-15 PROCEDURE — 87040 BLOOD CULTURE FOR BACTERIA: CPT | Performed by: EMERGENCY MEDICINE

## 2024-07-15 PROCEDURE — 96365 THER/PROPH/DIAG IV INF INIT: CPT

## 2024-07-15 RX ORDER — SODIUM,POTASSIUM PHOSPHATES 280-250MG
2 POWDER IN PACKET (EA) ORAL
Status: DISCONTINUED | OUTPATIENT
Start: 2024-07-15 | End: 2024-07-19 | Stop reason: HOSPADM

## 2024-07-15 RX ORDER — FAMOTIDINE 10 MG/ML
20 INJECTION INTRAVENOUS 2 TIMES DAILY
Status: DISCONTINUED | OUTPATIENT
Start: 2024-07-15 | End: 2024-07-15

## 2024-07-15 RX ORDER — APIXABAN 2.5 MG/1
2.5 TABLET, FILM COATED ORAL 2 TIMES DAILY
Status: ON HOLD | COMMUNITY
End: 2024-07-19 | Stop reason: HOSPADM

## 2024-07-15 RX ORDER — IBUPROFEN 200 MG
16 TABLET ORAL
Status: DISCONTINUED | OUTPATIENT
Start: 2024-07-15 | End: 2024-07-19 | Stop reason: HOSPADM

## 2024-07-15 RX ORDER — IBUPROFEN 200 MG
24 TABLET ORAL
Status: DISCONTINUED | OUTPATIENT
Start: 2024-07-15 | End: 2024-07-19 | Stop reason: HOSPADM

## 2024-07-15 RX ORDER — FAMOTIDINE 20 MG/1
20 TABLET, FILM COATED ORAL DAILY
Status: DISCONTINUED | OUTPATIENT
Start: 2024-07-15 | End: 2024-07-19 | Stop reason: HOSPADM

## 2024-07-15 RX ORDER — ONDANSETRON HYDROCHLORIDE 2 MG/ML
4 INJECTION, SOLUTION INTRAVENOUS EVERY 6 HOURS PRN
Status: DISCONTINUED | OUTPATIENT
Start: 2024-07-15 | End: 2024-07-19 | Stop reason: HOSPADM

## 2024-07-15 RX ORDER — ALUMINUM HYDROXIDE, MAGNESIUM HYDROXIDE, AND SIMETHICONE 1200; 120; 1200 MG/30ML; MG/30ML; MG/30ML
30 SUSPENSION ORAL 4 TIMES DAILY PRN
Status: DISCONTINUED | OUTPATIENT
Start: 2024-07-15 | End: 2024-07-19 | Stop reason: HOSPADM

## 2024-07-15 RX ORDER — LANOLIN ALCOHOL/MO/W.PET/CERES
800 CREAM (GRAM) TOPICAL
Status: DISCONTINUED | OUTPATIENT
Start: 2024-07-15 | End: 2024-07-19 | Stop reason: HOSPADM

## 2024-07-15 RX ORDER — NALOXONE HCL 0.4 MG/ML
0.02 VIAL (ML) INJECTION
Status: DISCONTINUED | OUTPATIENT
Start: 2024-07-15 | End: 2024-07-19 | Stop reason: HOSPADM

## 2024-07-15 RX ORDER — ACETAMINOPHEN 325 MG/1
650 TABLET ORAL EVERY 4 HOURS PRN
Status: DISCONTINUED | OUTPATIENT
Start: 2024-07-15 | End: 2024-07-19 | Stop reason: HOSPADM

## 2024-07-15 RX ORDER — ACETAMINOPHEN 325 MG/1
650 TABLET ORAL EVERY 8 HOURS PRN
Status: DISCONTINUED | OUTPATIENT
Start: 2024-07-15 | End: 2024-07-17

## 2024-07-15 RX ORDER — VANCOMYCIN HCL IN 5 % DEXTROSE 1G/250ML
1000 PLASTIC BAG, INJECTION (ML) INTRAVENOUS ONCE
Status: COMPLETED | OUTPATIENT
Start: 2024-07-15 | End: 2024-07-15

## 2024-07-15 RX ORDER — SODIUM CHLORIDE 0.9 % (FLUSH) 0.9 %
10 SYRINGE (ML) INJECTION EVERY 12 HOURS PRN
Status: DISCONTINUED | OUTPATIENT
Start: 2024-07-15 | End: 2024-07-19 | Stop reason: HOSPADM

## 2024-07-15 RX ORDER — ACETAMINOPHEN 500 MG
1000 TABLET ORAL
Status: COMPLETED | OUTPATIENT
Start: 2024-07-15 | End: 2024-07-15

## 2024-07-15 RX ORDER — GLUCAGON 1 MG
1 KIT INJECTION
Status: DISCONTINUED | OUTPATIENT
Start: 2024-07-15 | End: 2024-07-19 | Stop reason: HOSPADM

## 2024-07-15 RX ORDER — TALC
6 POWDER (GRAM) TOPICAL NIGHTLY PRN
Status: DISCONTINUED | OUTPATIENT
Start: 2024-07-15 | End: 2024-07-19 | Stop reason: HOSPADM

## 2024-07-15 RX ADMIN — PIPERACILLIN SODIUM AND TAZOBACTAM SODIUM 4.5 G: 4; .5 INJECTION, POWDER, LYOPHILIZED, FOR SOLUTION INTRAVENOUS at 06:07

## 2024-07-15 RX ADMIN — FAMOTIDINE 20 MG: 20 TABLET ORAL at 11:07

## 2024-07-15 RX ADMIN — ACETAMINOPHEN 1000 MG: 500 TABLET, FILM COATED ORAL at 06:07

## 2024-07-15 RX ADMIN — VANCOMYCIN HYDROCHLORIDE 1000 MG: 1 INJECTION, POWDER, LYOPHILIZED, FOR SOLUTION INTRAVENOUS at 09:07

## 2024-07-15 NOTE — ED PROVIDER NOTES
Encounter Date: 7/15/2024       History     Chief Complaint   Patient presents with    Fever     ON FRIDAY AT DIALYSIS,     ABNORMAL LABS     POS BLOOD CULTURES DRAWN FROM DIALYSIS CATH FRIDAY     HPI patient is a 44-year-old man who presents emergency department for staph infection from his tunneled dialysis catheter in his right upper chest on blood cultures that were drawn Friday from [facility] dialysis center.  He has a temperature of a 102.3 ° F at the time of obtaining HPI.  He reports receiving IV antibiotics at dialysis on Friday and then again today.  He reports chills, decreased appetite, fever since last Thursday.  He has a fistula in the left upper extremity that has been trying to mature since February.  Of note patient had similar infection back in February and states he had the catheter exchanged and received IV antibiotics.  Review of patient's allergies indicates:   Allergen Reactions    Mushroom Swelling     Tongue swells      Past Medical History:   Diagnosis Date    Allergy     Anemia     Anxiety     CHF (congestive heart failure)     Depression     High blood pressure with chronic kidney disease, stage 5 chronic kidney disease or end stage renal disease     Hypertension      Past Surgical History:   Procedure Laterality Date    FISTULOGRAM Bilateral 4/30/2024    Procedure: Fistulogram;  Surgeon: Khoobehi, Ali, MD;  Location: [facility] CATH/EP LAB;  Service: Vascular;  Laterality: Bilateral;    HERNIA REPAIR Right     With mesh    INSERTION, CATHETER, TUNNELED N/A 6/22/2023    Procedure: Insertion,catheter,tunneled;  Surgeon: Everett Caicedo MD;  Location: [facility] OR;  Service: General;  Laterality: N/A;    PERCUTANEOUS TRANSLUMINAL ANGIOPLASTY OF ARTERIOVENOUS FISTULA Left 4/30/2024    Procedure: PTA, AV FISTULA;  Surgeon: Khoobehi, Ali, MD;  Location: [facility] CATH/EP LAB;  Service: Vascular;  Laterality: Left;     No family history on file.  Social History     Tobacco Use    Smoking status: Never      Passive exposure: Never    Smokeless tobacco: Never   Substance Use Topics    Alcohol use: Never    Drug use: Never     Review of Systems   Constitutional:  Positive for activity change, appetite change, chills, fatigue and fever.   HENT:  Negative for sore throat.    Respiratory:  Negative for shortness of breath.    Cardiovascular:  Negative for chest pain.   Gastrointestinal:  Negative for nausea.   Genitourinary:  Negative for dysuria.   Skin:  Negative for rash.   Neurological:  Negative for weakness.   Hematological:  Does not bruise/bleed easily.       Physical Exam     Initial Vitals [07/15/24 1740]   BP Pulse Resp Temp SpO2   (!) 176/101 (!) 131 18 98.6 °F (37 °C) 97 %      MAP       --         Physical Exam    Constitutional: Vital signs are normal. He appears well-developed and well-nourished.  Non-toxic appearance. No distress.   HENT:   Head: Normocephalic and atraumatic.   Eyes: EOM are normal. Pupils are equal, round, and reactive to light.   Neck: Neck supple. No JVD present.   Normal range of motion.  Cardiovascular:  Regular rhythm, normal heart sounds and intact distal pulses.   Tachycardia present.   Exam reveals no gallop and no friction rub.       No murmur heard.  Tunneled catheter in the right upper chest wall with some mild erythema/darkening of the skin in the superior aspect of the skin.  No purulence.   Pulmonary/Chest: Breath sounds normal. He has no wheezes. He has no rhonchi. He has no rales.   Abdominal: Abdomen is soft. Bowel sounds are normal. There is no abdominal tenderness. There is no rebound and no guarding.   Musculoskeletal:         General: Normal range of motion.      Cervical back: Normal range of motion and neck supple. No rigidity.     Neurological: He is alert and oriented to person, place, and time. He has normal strength and normal reflexes. No cranial nerve deficit or sensory deficit. He exhibits normal muscle tone. Coordination normal. GCS eye subscore is 4. GCS  verbal subscore is 5. GCS motor subscore is 6.   Skin: Skin is warm and dry. Capillary refill takes less than 2 seconds.   Tactile fever   Psychiatric: He has a normal mood and affect. His speech is normal and behavior is normal. He is not actively hallucinating.         ED Course   Procedures  Labs Reviewed   CBC W/ AUTO DIFFERENTIAL - Abnormal; Notable for the following components:       Result Value    RBC 3.16 (*)     Hemoglobin 10.2 (*)     Hematocrit 31.2 (*)     MCV 99 (*)     MCH 32.3 (*)     RDW 15.4 (*)     Mono # 1.6 (*)     Lymph % 17.4 (*)     Mono % 23.7 (*)     All other components within normal limits   COMPREHENSIVE METABOLIC PANEL - Abnormal; Notable for the following components:    Sodium 134 (*)     Chloride 91 (*)     Glucose 124 (*)     BUN 22 (*)     Creatinine 10.1 (*)     Calcium 8.6 (*)     Total Protein 8.7 (*)     AST 58 (*)     ALT 54 (*)     eGFR 6.0 (*)     All other components within normal limits   SARS-COV-2 RNA AMPLIFICATION, QUAL   INFLUENZA A AND B ANTIGEN    Narrative:     Specimen Source->Nasopharyngeal Swab   URINALYSIS, REFLEX TO URINE CULTURE   ISTAT LACTATE   POCT LACTATE     EKG Readings: (Independently Interpreted)   Initial Reading: No STEMI.   Sinus tachycardia, 119 beats per minute with incomplete right bundle-branch block, LVH, possible left atrial enlargement     ECG Results              EKG 12-lead (In process)        Collection Time Result Time QRS Duration OHS QTC Calculation    07/15/24 17:47:20 07/16/24 05:08:54 98 458                     In process by Interface, Lab In [facility] (07/16/24 05:09:02)                   Narrative:    Test Reason : R00.0,    Vent. Rate : 119 BPM     Atrial Rate : 119 BPM     P-R Int : 140 ms          QRS Dur : 098 ms      QT Int : 326 ms       P-R-T Axes : 034 -66 080 degrees     QTc Int : 458 ms    Sinus tachycardia  Possible Left atrial enlargement  Incomplete right bundle branch block  Left anterior fascicular block  LVH with  repolarization abnormality ( R in aVL , [place] product )  Abnormal ECG  When compared with ECG of 27-JUN-2023 03:11,  Incomplete right bundle branch block is now Present    Referred By: BRANDON   SELF           Confirmed By:                                      EKG 12-lead (Final result)  Result time 07/16/24 09:32:38      Final result by Unknown User (07/16/24 09:32:38)                                      Imaging Results              X-Ray Chest AP Portable (Final result)  Result time 07/15/24 18:56:49      Final result by Jono Yoder MD (07/15/24 18:56:49)                   Impression:      No acute radiographic abnormality in the chest.      Electronically signed by: Jono Yoder  Date:    07/15/2024  Time:    18:56               Narrative:    EXAMINATION:  XR CHEST AP PORTABLE    CLINICAL HISTORY:  Sepsis;.    TECHNIQUE:  Single frontal portable view of the chest was performed.    COMPARISON:  02/21/2024    FINDINGS:  Right internal jugular central venous catheter in place with catheter tip projected over the right superior cavoatrial junction/right atrium.  Cardiomediastinal silhouette is within normal limits.Lungs appear grossly clear.  No definite focal consolidation, pleural effusion, or pneumothorax.  -                                       Medications   sodium chloride 0.9% flush 10 mL (has no administration in time range)   melatonin tablet 6 mg (has no administration in time range)   ondansetron injection 4 mg (4 mg Intravenous Given 7/16/24 2447)   acetaminophen tablet 650 mg (650 mg Oral Given 7/16/24 2259)   aluminum-magnesium hydroxide-simethicone 200-200-20 mg/5 mL suspension 30 mL (has no administration in time range)   acetaminophen tablet 650 mg (has no administration in time range)   naloxone 0.4 mg/mL injection 0.02 mg (has no administration in time range)   potassium bicarbonate disintegrating tablet 50 mEq (has no administration in time range)   potassium bicarbonate disintegrating  tablet 35 mEq (has no administration in time range)   potassium bicarbonate disintegrating tablet 60 mEq (has no administration in time range)   magnesium oxide tablet 800 mg (has no administration in time range)   magnesium oxide tablet 800 mg (has no administration in time range)   potassium, sodium phosphates 280-160-250 mg packet 2 packet (has no administration in time range)   potassium, sodium phosphates 280-160-250 mg packet 2 packet (has no administration in time range)   potassium, sodium phosphates 280-160-250 mg packet 2 packet (has no administration in time range)   glucose chewable tablet 16 g (has no administration in time range)   glucose chewable tablet 24 g (has no administration in time range)   dextrose 50% injection 12.5 g (has no administration in time range)   dextrose 50% injection 25 g (has no administration in time range)   glucagon (human recombinant) injection 1 mg (has no administration in time range)   vancomycin - pharmacy to dose (has no administration in time range)   famotidine tablet 20 mg (20 mg Oral Given 7/16/24 0940)   fluticasone propionate 50 mcg/actuation nasal spray 100 mcg (100 mcg Each Nostril Given 7/16/24 0109)   guaiFENesin 12 hr tablet 600 mg (600 mg Oral Given 7/16/24 0941)   piperacillin-tazobactam 4.5 g in dextrose 5 % 100 mL IVPB (ready to mix) (0 g Intravenous Stopped 7/15/24 1906)   acetaminophen tablet 1,000 mg (1,000 mg Oral Given 7/15/24 1835)   vancomycin in dextrose 5 % 1 gram/250 mL IVPB 1,000 mg (0 mg Intravenous Stopped 7/15/24 2230)     Medical Decision Making  43 yo man on HD undergoing renal transplant eval presents with fever, chills, recent blood culture drawn from hemosplit catheter at dialysis on friday positive for staph. DDX sepsis/bacteremia, endocarditis. IV vancomycin 1000 mg given at dialysis today. Will add another 1000 mg IV vanc and 4.5g of zosyn. Sepsis stearns initiated. Discussed with HM who will admit for IVabx and catheter  exchange.    Amount and/or Complexity of Data Reviewed  Labs: ordered.  Radiology: ordered.    Risk  OTC drugs.  Prescription drug management.  Decision regarding hospitalization.    Critical Care  Total time providing critical care: 40 minutes      Additional MDM:   Sepsis:   This patient does have evidence of infective focus  My overall impression is sepsis.  Source: bacterimia from dialysis catheter  Antibiotics given- Antibiotics     Patient Encounter Information Not Found      Latest lactate reviewed- 1.76  Organ dysfunction indicated by none    Fluid challenge Not needed - patient is not hypotensive      Post- resuscitation assessment No - Post resuscitation assessment not needed       Will Not start Pressors- Levophed for MAP of 65  Source control achieved by: iv abx                                       Clinical Impression:  Final diagnoses:  [R00.0] Tachycardia  [A41.9] Sepsis (Primary)          ED Disposition Condition    Observation                 Ganesh Owens MD  07/16/24 9134

## 2024-07-15 NOTE — TELEPHONE ENCOUNTER
MA notes per Adherence form     FOR THE PAST THREE MONTHS:    0-AMA's  0-No-shows    No concerns with care giving, transportation, or mental health    Scanned in pt's media    Florinda Clements  Abdominal Transplant MA

## 2024-07-16 ENCOUNTER — CLINICAL SUPPORT (OUTPATIENT)
Dept: CARDIOLOGY | Facility: HOSPITAL | Age: 44
End: 2024-07-16
Attending: INTERNAL MEDICINE
Payer: COMMERCIAL

## 2024-07-16 VITALS — HEIGHT: 74 IN | BODY MASS INDEX: 36.3 KG/M2 | WEIGHT: 282.88 LBS

## 2024-07-16 PROBLEM — I50.32 CHRONIC HEART FAILURE WITH PRESERVED EJECTION FRACTION: Status: RESOLVED | Noted: 2023-06-21 | Resolved: 2024-07-16

## 2024-07-16 PROBLEM — R79.89 ELEVATED TROPONIN: Status: RESOLVED | Noted: 2022-10-10 | Resolved: 2024-07-16

## 2024-07-16 PROBLEM — Z99.2 DEPENDENCE ON RENAL DIALYSIS: Status: RESOLVED | Noted: 2024-02-21 | Resolved: 2024-07-16

## 2024-07-16 PROBLEM — I1A.0 RESISTANT HYPERTENSION: Status: RESOLVED | Noted: 2023-06-21 | Resolved: 2024-07-16

## 2024-07-16 LAB
ACINETOBACTER CALCOACETICUS/BAUMANNII COMPLEX: NOT DETECTED
ALBUMIN SERPL BCP-MCNC: 3.6 G/DL (ref 3.5–5.2)
ALP SERPL-CCNC: 80 U/L (ref 55–135)
ALT SERPL W/O P-5'-P-CCNC: 52 U/L (ref 10–44)
ANION GAP SERPL CALC-SCNC: 15 MMOL/L (ref 8–16)
AST SERPL-CCNC: 53 U/L (ref 10–40)
BACTEROIDES FRAGILIS: NOT DETECTED
BASOPHILS # BLD AUTO: 0.03 K/UL (ref 0–0.2)
BASOPHILS # BLD AUTO: 0.03 K/UL (ref 0–0.2)
BASOPHILS NFR BLD: 0.5 % (ref 0–1.9)
BASOPHILS NFR BLD: 0.5 % (ref 0–1.9)
BILIRUB SERPL-MCNC: 0.5 MG/DL (ref 0.1–1)
BSA FOR ECHO PROCEDURE: 2.59 M2
BUN SERPL-MCNC: 28 MG/DL (ref 6–20)
CALCIUM SERPL-MCNC: 8.6 MG/DL (ref 8.7–10.5)
CANDIDA ALBICANS: NOT DETECTED
CANDIDA AURIS: NOT DETECTED
CANDIDA GLABRATA: NOT DETECTED
CANDIDA KRUSEI: NOT DETECTED
CANDIDA PARAPSILOSIS: NOT DETECTED
CANDIDA TROPICALIS: NOT DETECTED
CHLORIDE SERPL-SCNC: 94 MMOL/L (ref 95–110)
CO2 SERPL-SCNC: 28 MMOL/L (ref 23–29)
CREAT SERPL-MCNC: 12 MG/DL (ref 0.5–1.4)
CRP SERPL-MCNC: >16 MG/DL
CRYPTOCOCCUS NEOFORMANS/GATTII: NOT DETECTED
CTX-M GENE (ESBL PRODUCER): ABNORMAL
CV ECHO LV RWT: 0.42 CM
DIFFERENTIAL METHOD BLD: ABNORMAL
DIFFERENTIAL METHOD BLD: ABNORMAL
ECHO LV POSTERIOR WALL: 1.1 CM (ref 0.6–1.1)
ENTEROBACTER CLOACAE COMPLEX: NOT DETECTED
ENTEROBACTERALES: NOT DETECTED
ENTEROCOCCUS FAECALIS: NOT DETECTED
ENTEROCOCCUS FAECIUM: NOT DETECTED
EOSINOPHIL # BLD AUTO: 0.1 K/UL (ref 0–0.5)
EOSINOPHIL # BLD AUTO: 0.1 K/UL (ref 0–0.5)
EOSINOPHIL NFR BLD: 1.1 % (ref 0–8)
EOSINOPHIL NFR BLD: 1.1 % (ref 0–8)
ERYTHROCYTE [DISTWIDTH] IN BLOOD BY AUTOMATED COUNT: 15.4 % (ref 11.5–14.5)
ERYTHROCYTE [DISTWIDTH] IN BLOOD BY AUTOMATED COUNT: 15.4 % (ref 11.5–14.5)
ESCHERICHIA COLI: NOT DETECTED
EST. GFR  (NO RACE VARIABLE): 4.9 ML/MIN/1.73 M^2
ESTIMATED AVG GLUCOSE: 128 MG/DL (ref 68–131)
FRACTIONAL SHORTENING: 40 % (ref 28–44)
GLUCOSE SERPL-MCNC: 105 MG/DL (ref 70–110)
HAEMOPHILUS INFLUENZAE: NOT DETECTED
HBA1C MFR BLD: 6.1 % (ref 4.5–6.2)
HCT VFR BLD AUTO: 28.3 % (ref 40–54)
HCT VFR BLD AUTO: 28.3 % (ref 40–54)
HGB BLD-MCNC: 9.1 G/DL (ref 14–18)
HGB BLD-MCNC: 9.1 G/DL (ref 14–18)
HLA FREEZE AND HOLD INTERPRETATION: NORMAL
HLAFH COLLECTION DATE: NORMAL
HPRA INTERPRETATION: NORMAL
IMM GRANULOCYTES # BLD AUTO: 0.03 K/UL (ref 0–0.04)
IMM GRANULOCYTES # BLD AUTO: 0.03 K/UL (ref 0–0.04)
IMM GRANULOCYTES NFR BLD AUTO: 0.5 % (ref 0–0.5)
IMM GRANULOCYTES NFR BLD AUTO: 0.5 % (ref 0–0.5)
IMP GENE (CARBAPENEM RESISTANT): ABNORMAL
INTERVENTRICULAR SEPTUM: 1.4 CM (ref 0.6–1.1)
KLEBSIELLA AEROGENES: NOT DETECTED
KLEBSIELLA OXYTOCA: NOT DETECTED
KLEBSIELLA PNEUMONIAE GROUP: NOT DETECTED
KPC RESISTANCE GENE (CARBAPENEM): ABNORMAL
LEFT ATRIUM SIZE: 3.9 CM
LEFT INTERNAL DIMENSION IN SYSTOLE: 3.2 CM (ref 2.1–4)
LEFT VENTRICLE DIASTOLIC VOLUME INDEX: 53.57 ML/M2
LEFT VENTRICLE DIASTOLIC VOLUME: 135 ML
LEFT VENTRICLE MASS INDEX: 108 G/M2
LEFT VENTRICLE SYSTOLIC VOLUME INDEX: 16.3 ML/M2
LEFT VENTRICLE SYSTOLIC VOLUME: 41 ML
LEFT VENTRICULAR INTERNAL DIMENSION IN DIASTOLE: 5.3 CM (ref 3.5–6)
LEFT VENTRICULAR MASS: 271.56 G
LISTERIA MONOCYTOGENES: NOT DETECTED
LVED V (TEICH): 135 ML
LVES V (TEICH): 41 ML
LYMPHOCYTES # BLD AUTO: 1.1 K/UL (ref 1–4.8)
LYMPHOCYTES # BLD AUTO: 1.1 K/UL (ref 1–4.8)
LYMPHOCYTES NFR BLD: 17.2 % (ref 18–48)
LYMPHOCYTES NFR BLD: 17.2 % (ref 18–48)
MAGNESIUM SERPL-MCNC: 1.8 MG/DL (ref 1.6–2.6)
MCH RBC QN AUTO: 32.2 PG (ref 27–31)
MCH RBC QN AUTO: 32.2 PG (ref 27–31)
MCHC RBC AUTO-ENTMCNC: 32.2 G/DL (ref 32–36)
MCHC RBC AUTO-ENTMCNC: 32.2 G/DL (ref 32–36)
MCR-1: ABNORMAL
MCV RBC AUTO: 100 FL (ref 82–98)
MCV RBC AUTO: 100 FL (ref 82–98)
MEC A/C AND MREJ (MRSA): NOT DETECTED
MEC A/C: ABNORMAL
MONOCYTES # BLD AUTO: 1.9 K/UL (ref 0.3–1)
MONOCYTES # BLD AUTO: 1.9 K/UL (ref 0.3–1)
MONOCYTES NFR BLD: 28.5 % (ref 4–15)
MONOCYTES NFR BLD: 28.5 % (ref 4–15)
NDM GENE (CARBAPENEM RESISTANT): ABNORMAL
NEISSERIA MENINGITIDIS: NOT DETECTED
NEUTROPHILS # BLD AUTO: 3.4 K/UL (ref 1.8–7.7)
NEUTROPHILS # BLD AUTO: 3.4 K/UL (ref 1.8–7.7)
NEUTROPHILS NFR BLD: 52.2 % (ref 38–73)
NEUTROPHILS NFR BLD: 52.2 % (ref 38–73)
NRBC BLD-RTO: 0 /100 WBC
NRBC BLD-RTO: 0 /100 WBC
OHS CV RV/LV RATIO: 0.58 CM
OHS QRS DURATION: 98 MS
OHS QTC CALCULATION: 458 MS
OXA-48-LIKE (CARBAPENEM RESISTANT): ABNORMAL
PLATELET # BLD AUTO: 216 K/UL (ref 150–450)
PLATELET # BLD AUTO: 216 K/UL (ref 150–450)
PMV BLD AUTO: 10.5 FL (ref 9.2–12.9)
PMV BLD AUTO: 10.5 FL (ref 9.2–12.9)
POTASSIUM SERPL-SCNC: 3.9 MMOL/L (ref 3.5–5.1)
PROT SERPL-MCNC: 7.8 G/DL (ref 6–8.4)
PROTEUS SPECIES: NOT DETECTED
PSEUDOMONAS AERUGINOSA: NOT DETECTED
RBC # BLD AUTO: 2.83 M/UL (ref 4.6–6.2)
RBC # BLD AUTO: 2.83 M/UL (ref 4.6–6.2)
RIGHT VENTRICULAR END-DIASTOLIC DIMENSION: 3.1 CM
SALMONELLA SP: NOT DETECTED
SERRATIA MARCESCENS: NOT DETECTED
SODIUM SERPL-SCNC: 137 MMOL/L (ref 136–145)
STAPHYLOCOCCUS AUREUS: DETECTED
STAPHYLOCOCCUS EPIDERMIDIS: NOT DETECTED
STAPHYLOCOCCUS LUGDUNESIS: NOT DETECTED
STAPHYLOCOCCUS SPECIES: ABNORMAL
STENOTROPHOMONAS MALTOPHILIA: NOT DETECTED
STREPTOCOCCUS AGALACTIAE: NOT DETECTED
STREPTOCOCCUS PNEUMONIAE: NOT DETECTED
STREPTOCOCCUS PYOGENES: NOT DETECTED
STREPTOCOCCUS SPECIES: NOT DETECTED
VAN A/B (VRE GENE): ABNORMAL
VANCOMYCIN TROUGH SERPL-MCNC: 27.5 UG/ML
VIM GENE (CARBAPENEM RESISTANT): ABNORMAL
WBC # BLD AUTO: 6.52 K/UL (ref 3.9–12.7)
WBC # BLD AUTO: 6.52 K/UL (ref 3.9–12.7)
Z-SCORE OF LEFT VENTRICULAR DIMENSION IN END DIASTOLE: -10.19
Z-SCORE OF LEFT VENTRICULAR DIMENSION IN END SYSTOLE: -7.9

## 2024-07-16 PROCEDURE — 36415 COLL VENOUS BLD VENIPUNCTURE: CPT | Performed by: INTERNAL MEDICINE

## 2024-07-16 PROCEDURE — 21400001 HC TELEMETRY ROOM

## 2024-07-16 PROCEDURE — 80053 COMPREHEN METABOLIC PANEL: CPT | Performed by: INTERNAL MEDICINE

## 2024-07-16 PROCEDURE — 94760 N-INVAS EAR/PLS OXIMETRY 1: CPT

## 2024-07-16 PROCEDURE — 25000003 PHARM REV CODE 250: Performed by: HOSPITALIST

## 2024-07-16 PROCEDURE — 80202 ASSAY OF VANCOMYCIN: CPT | Performed by: INTERNAL MEDICINE

## 2024-07-16 PROCEDURE — 99900031 HC PATIENT EDUCATION (STAT)

## 2024-07-16 PROCEDURE — 93325 DOPPLER ECHO COLOR FLOW MAPG: CPT

## 2024-07-16 PROCEDURE — 93308 TTE F-UP OR LMTD: CPT | Mod: 26,,, | Performed by: GENERAL PRACTICE

## 2024-07-16 PROCEDURE — 93325 DOPPLER ECHO COLOR FLOW MAPG: CPT | Mod: 26,,, | Performed by: GENERAL PRACTICE

## 2024-07-16 PROCEDURE — 83735 ASSAY OF MAGNESIUM: CPT | Performed by: INTERNAL MEDICINE

## 2024-07-16 PROCEDURE — 86140 C-REACTIVE PROTEIN: CPT | Performed by: INTERNAL MEDICINE

## 2024-07-16 PROCEDURE — 85025 COMPLETE CBC W/AUTO DIFF WBC: CPT | Performed by: INTERNAL MEDICINE

## 2024-07-16 PROCEDURE — 83036 HEMOGLOBIN GLYCOSYLATED A1C: CPT | Performed by: INTERNAL MEDICINE

## 2024-07-16 PROCEDURE — 25000242 PHARM REV CODE 250 ALT 637 W/ HCPCS: Performed by: HOSPITALIST

## 2024-07-16 PROCEDURE — 96375 TX/PRO/DX INJ NEW DRUG ADDON: CPT

## 2024-07-16 PROCEDURE — 25000003 PHARM REV CODE 250: Performed by: INTERNAL MEDICINE

## 2024-07-16 PROCEDURE — 87040 BLOOD CULTURE FOR BACTERIA: CPT | Performed by: INTERNAL MEDICINE

## 2024-07-16 PROCEDURE — 99223 1ST HOSP IP/OBS HIGH 75: CPT | Mod: ,,, | Performed by: INTERNAL MEDICINE

## 2024-07-16 PROCEDURE — 63600175 PHARM REV CODE 636 W HCPCS: Performed by: INTERNAL MEDICINE

## 2024-07-16 RX ORDER — DIAZEPAM 5 MG/1
10 TABLET ORAL ONCE
Status: COMPLETED | OUTPATIENT
Start: 2024-07-17 | End: 2024-07-16

## 2024-07-16 RX ORDER — GUAIFENESIN 600 MG/1
600 TABLET, EXTENDED RELEASE ORAL 2 TIMES DAILY
Status: DISCONTINUED | OUTPATIENT
Start: 2024-07-16 | End: 2024-07-19 | Stop reason: HOSPADM

## 2024-07-16 RX ORDER — MUPIROCIN 20 MG/G
OINTMENT TOPICAL 2 TIMES DAILY
Status: DISCONTINUED | OUTPATIENT
Start: 2024-07-17 | End: 2024-07-19 | Stop reason: HOSPADM

## 2024-07-16 RX ORDER — FLUTICASONE PROPIONATE 50 MCG
2 SPRAY, SUSPENSION (ML) NASAL DAILY
Status: DISCONTINUED | OUTPATIENT
Start: 2024-07-16 | End: 2024-07-19 | Stop reason: HOSPADM

## 2024-07-16 RX ORDER — LIDOCAINE AND PRILOCAINE 25; 25 MG/G; MG/G
CREAM TOPICAL
Status: DISCONTINUED | OUTPATIENT
Start: 2024-07-17 | End: 2024-07-19 | Stop reason: HOSPADM

## 2024-07-16 RX ADMIN — FAMOTIDINE 20 MG: 20 TABLET ORAL at 09:07

## 2024-07-16 RX ADMIN — FLUTICASONE PROPIONATE 100 MCG: 50 SPRAY, METERED NASAL at 01:07

## 2024-07-16 RX ADMIN — GUAIFENESIN 600 MG: 600 TABLET, EXTENDED RELEASE ORAL at 08:07

## 2024-07-16 RX ADMIN — ACETAMINOPHEN 650 MG: 325 TABLET ORAL at 06:07

## 2024-07-16 RX ADMIN — GUAIFENESIN 600 MG: 600 TABLET, EXTENDED RELEASE ORAL at 01:07

## 2024-07-16 RX ADMIN — Medication 6 MG: at 08:07

## 2024-07-16 RX ADMIN — ACETAMINOPHEN 650 MG: 325 TABLET ORAL at 04:07

## 2024-07-16 RX ADMIN — DIAZEPAM 10 MG: 5 TABLET ORAL at 11:07

## 2024-07-16 RX ADMIN — GUAIFENESIN 600 MG: 600 TABLET, EXTENDED RELEASE ORAL at 09:07

## 2024-07-16 RX ADMIN — ONDANSETRON 4 MG: 2 INJECTION INTRAMUSCULAR; INTRAVENOUS at 05:07

## 2024-07-16 NOTE — PROGRESS NOTES
"Pharmacokinetic Initial Assessment: IV Vancomycin    Assessment/Plan:    Initiate intravenous vancomycin with loading dose of 1000 mg once with subsequent doses when random concentrations are less than 20 mcg/mL  Desired empiric serum trough concentration is 15 to 20 mcg/mL  Draw vancomycin random level on 7/16 at 2000.  Pharmacy will continue to follow and monitor vancomycin.      Please contact pharmacy at extension [phone] with any questions regarding this assessment.     Thank you for the consult,   Bety Malin       Patient brief summary:  João Ham is a 44 y.o. male initiated on antimicrobial therapy with IV Vancomycin for treatment of suspected bacteremia    Drug Allergies:   Review of patient's allergies indicates:   Allergen Reactions    Mushroom Swelling     Tongue swells        Actual Body Weight:   128.3    Renal Function:   Estimated Creatinine Clearance: 13.4 mL/min (A) (based on SCr of 10.1 mg/dL (H)).,     Dialysis Method (if applicable):  intermittent HD    CBC (last 72 hours):  Recent Labs   Lab Result Units 07/15/24  1823   WBC K/uL 6.68   Hemoglobin g/dL 10.2*   Hematocrit % 31.2*   Platelets K/uL 225   Gran % % 57.8   Lymph % % 17.4*   Mono % % 23.7*   Eosinophil % % 0.7   Basophil % % 0.1   Differential Method  Automated       Metabolic Panel (last 72 hours):  Recent Labs   Lab Result Units 07/15/24  1823   Sodium mmol/L 134*   Potassium mmol/L 3.9   Chloride mmol/L 91*   CO2 mmol/L 28   Glucose mg/dL 124*   BUN mg/dL 22*   Creatinine mg/dL 10.1*   Albumin g/dL 4.0   Total Bilirubin mg/dL 0.5   Alkaline Phosphatase U/L 90   AST U/L 58*   ALT U/L 54*       Drug levels (last 3 results):  No results for input(s): "VANCOMYCINRA", "VANCORANDOM", "VANCOMYCINPE", "VANCOPEAK", "VANCOMYCINTR", "VANCOTROUGH" in the last 72 hours.    Microbiologic Results:  Microbiology Results (last 7 days)       Procedure Component Value Units Date/Time    Blood culture x two cultures. Draw prior to " antibiotics. [[id number]] Collected: 07/15/24 1802    Order Status: Sent Specimen: Blood from Peripheral, Antecubital, Right Updated: 07/15/24 1807    Blood culture x two cultures. Draw prior to antibiotics. [[id number]] Collected: 07/15/24 1756    Order Status: Sent Specimen: Blood from Peripheral, Hand, Right Updated: 07/15/24 1807

## 2024-07-16 NOTE — ED NOTES
Spoke with Dr. Owens and patient has rec'd 1GM of Vancomycin at dialysis today.  Patient will only need 1 additional GM to meet his 2GM for sepsis.

## 2024-07-16 NOTE — ASSESSMENT & PLAN NOTE
Body mass index is 36.32 kg/m². Morbid obesity complicates all aspects of disease management from diagnostic modalities to treatment. Weight loss encouraged and health benefits explained to patient.

## 2024-07-16 NOTE — CONSULTS
"[facility]   Department of Infectious Disease  Consult Note        PATIENT NAME: João Ham  MRN: [id number]  TODAY'S DATE: 07/16/2024  ADMIT DATE: 7/15/2024  LOS: 0 days    CHIEF COMPLAINT: Fever (ON FRIDAY AT DIALYSIS, ) and ABNORMAL LABS (POS BLOOD CULTURES DRAWN FROM DIALYSIS CATH FRIDAY)      PRINCIPLE PROBLEM: Bacteremia    REASON FOR CONSULT:     ASSESSMENT and PLAN      1. Vas-Cath auscultation Staphylococcus aureus bacteremia.  Await sensitivity of organism.  Continue vancomycin.  DC Zosyn.  He will need removal of Vas-Cath.    2. ESRD on hemodialysis.  Involves of plan to initiate dialysis through his left forearm AV fistula.  Management as per nephrologist.    3. Hypertension.  Management as per nephrologist and hospitalist.    RECOMMENDATIONS:   Continue vancomycin  DC Zosyn  Follow up blood culture results  He will need Vas-Cath removal    Thank you for this consult. Please send [employer] secure chat with any questions.    Antibiotics (From admission, onward)      Start     Stop Route Frequency Ordered    07/15/24 2316  vancomycin - pharmacy to dose  (vancomycin IVPB (PEDS and ADULTS))        Placed in "And" Linked Group    -- IV pharmacy to manage frequency 07/15/24 2217          Antifungals (From admission, onward)      None           Antivirals (From admission, onward)      None            HPI      João Ham is a 44 y.o. male with history of hypertension and ESRD on hemodialysis since June 20, 2023.  He has been evaluated for transplant.  He appears he received appropriate immunizations through transplant ID team at [facility] in 2023.  Also received ivermectin for positive Strongyloides IgG.  He previously had a left wrist AV fistula that failed.  Then had placement of a left forearm AV fistula complicated by stenosis, less intervention for stenosis appears to be 04/30/2024.    He has Staphylococcus aureus infection of his right IJ Vas-Cath in February 20, 2024 managed with " vancomycin Q dialysis and exchange of Vas-Cath.  Later developed pain and tenderness at the [employer] site and the Vas-Cath was removed and replaced at the same site in April 20, 2024.    He developed malaise associated with chills about 5 days ago.  States blood cultures were done at dialysis center and turned positive for Staphylococcus aureus.  He presented to the emergency room on 07/15/2024 for inpatient care.  Blood culture done in the ER is also growing GPC in clusters in 2/2 bottles.  ID asked to assist with his care.    Cultures   Blood culture 07/15/2024:  Staphylococcus aureus 2/2  Blood cultures 07/16/2024:  In progress    Antibiotics   Vancomycin:  07/15/2024-  Zosyn:  07/15/2024 x1 dose    Outdoor activities:  He lives with his nephew.  No pets at home.  He does not smoke or use any illicit drugs.  Travel:  No recent travel.  Had travel extensively [facility] [place] and also previously traveled to [place]  Implants:  None  Antibiotic history:  As above    Social History  Marital Status: Single  Alcohol History:  reports no history of alcohol use.  Tobacco History:  reports that he has never smoked. He has never been exposed to tobacco smoke. He has never used smokeless tobacco.  Drug History:  reports no history of drug use.    Review of patient's allergies indicates:   Allergen Reactions    Mushroom Swelling     Tongue swells      Past Medical History:   Diagnosis Date    Allergy     Anemia     Anxiety     CHF (congestive heart failure)     Depression     High blood pressure with chronic kidney disease, stage 5 chronic kidney disease or end stage renal disease     Hypertension      Past Surgical History:   Procedure Laterality Date    FISTULOGRAM Bilateral 4/30/2024    Procedure: Fistulogram;  Surgeon: Khoobehi, Ali, MD;  Location: [facility] CATH/EP LAB;  Service: Vascular;  Laterality: Bilateral;    HERNIA REPAIR Right     With mesh    INSERTION, CATHETER, TUNNELED N/A 6/22/2023    Procedure:  Insertion,catheter,tunneled;  Surgeon: Everett Caicedo MD;  Location: [facility] OR;  Service: General;  Laterality: N/A;    PERCUTANEOUS TRANSLUMINAL ANGIOPLASTY OF ARTERIOVENOUS FISTULA Left 4/30/2024    Procedure: PTA, AV FISTULA;  Surgeon: Khoobehi, Ali, MD;  Location: [facility] CATH/EP LAB;  Service: Vascular;  Laterality: Left;     No family history on file.    SUBJECTIVE     Review of systems  Constitutional:  Denies night sweats, loss of appetite.  HEENT: Denies visual changes, ear pain, sinus congestion, mouth pain or trouble swallowing, sore throat or dental pain.  Neck: Denies neck pain or lumps.  Respiratory: Denies shortness of breath, coughing, wheezing or hemoptysis.  Cardiovascular:  Denies chest pain, palpitations or edema.  Gastrointestinal: Denies  nausea, vomiting, constipation or diarrhea.  Genitourinary:  Denies dysuria, frequency, urgency or hematuria   Musculoskeletal:  Denies joint pain or swelling, difficulty walking.    Skin:  Denies rash or itching.  Neurologic:  Denies motor sensory loss, headaches or dizziness.    Psychiatric:  Denies changes in mood or behavior.     OBJECTIVE   Temp:  [98.5 °F (36.9 °C)-102.3 °F (39.1 °C)] 98.7 °F (37.1 °C)  Pulse:  [] 97  Resp:  [14-22] 16  SpO2:  [91 %-99 %] 97 %  BP: (125-176)/() 127/60  Temp:  [98.5 °F (36.9 °C)-102.3 °F (39.1 °C)]   Temp: 98.7 °F (37.1 °C) (07/16/24 1545)  Pulse: 97 (07/16/24 1545)  Resp: 16 (07/16/24 1545)  BP: 127/60 (07/16/24 1545)  SpO2: 97 % (07/16/24 1545)    Intake/Output Summary (Last 24 hours) at 7/16/2024 1626  Last data filed at 7/16/2024 1333  Gross per 24 hour   Intake 700 ml   Output --   Net 700 ml       Physical Exam  General:  Middle-aged man lying quietly in bed in no acute distress   Right chest:  Vas-Cath in place.  Left lower extremity:  Av fistula forearm with palpable thrill   CVS: S1 and 2 heard   Respiratory:  Clear to auscultation   Abdomen: Full, soft, nontender, no palpable organomegaly   Skin: No  "rash appreciated   CNS: No focal deficits  Musculoskeletal system: No joint or bony abnormalities appreciated    VAD:  Left IJ dialysis catheter  ISOLATION:  None    Wounds:  None    Significant Labs: All pertinent labs within the past 24 hours have been reviewed.    CBC LAST 7  Recent Labs   Lab 07/15/24  1823 07/16/24  0511   WBC 6.68 6.52  6.52   RBC 3.16* 2.83*  2.83*   HGB 10.2* 9.1*  9.1*   HCT 31.2* 28.3*  28.3*   MCV 99* 100*  100*   MCH 32.3* 32.2*  32.2*   MCHC 32.7 32.2  32.2   RDW 15.4* 15.4*  15.4*    216  216   MPV 10.6 10.5  10.5   GRAN 57.8  3.9 52.2  52.2  3.4  3.4   LYMPH 17.4*  1.2 17.2*  17.2*  1.1  1.1   MONO 23.7*  1.6* 28.5*  28.5*  1.9*  1.9*   BASO 0.01 0.03  0.03   NRBC 0 0  0       CHEMISTRY LAST 7  Recent Labs   Lab 07/15/24  1823 07/16/24  0510   * 137   K 3.9 3.9   CL 91* 94*   CO2 28 28   ANIONGAP 15 15   BUN 22* 28*   CREATININE 10.1* 12.0*   * 105   CALCIUM 8.6* 8.6*   MG  --  1.8   ALBUMIN 4.0 3.6   PROT 8.7* 7.8   ALKPHOS 90 80   ALT 54* 52*   AST 58* 53*   BILITOT 0.5 0.5       Estimated Creatinine Clearance: 11.3 mL/min (A) (based on SCr of 12 mg/dL (H)).    INFLAMMATORY/PROCAL  LAST 7  Recent Labs   Lab 07/16/24  0510   CRP >16.00*     No results found for: "ESR"  CRP   Date Value Ref Range Status   07/16/2024 >16.00 (H) <1.00 mg/dL Final     Comment:     CRP-Normal Application expected values:   <1.0        mg/dL   Normal Range  1.0 - 5.0  mg/dL   Indicates mild inflammation  5.0 - 10.0 mg/dL   Indicates severe inflammation  >10.0        mg/dL   Represents serious processes and   frequently         indicates the presence of a bacterial   infection.        PRIOR  MICROBIOLOGY:  Reviewed  No results found for the last 90 days.    LAST 7 DAYS MICROBIOLOGY   Microbiology Results (last 7 days)       Procedure Component Value Units Date/Time    Blood culture [[id number]] Collected: 07/16/24 1545    Order Status: Sent Specimen: Blood from " Peripheral, Forearm, Right     Blood culture x two cultures. Draw prior to antibiotics. [[id number]] Collected: 07/15/24 1756    Order Status: Completed Specimen: Blood from Peripheral, Hand, Right Updated: 07/16/24 1231     Blood Culture, Routine Gram stain aer bottle: Gram positive cocci      Positive results previously called on Order #[id number]    Narrative:      Aerobic and anaerobic    Rapid Organism ID by PCR (from Blood culture) [[id number]]  (Abnormal) Collected: 07/15/24 1802    Order Status: Completed Updated: 07/16/24 1226     Enterococcus faecalis Not Detected     Enterococcus faecium Not Detected     Listeria monocytogenes Not Detected     Staphylococcus spp. See species for ID     Staphylococcus aureus Detected     Staphylococcus epidermidis Not Detected     Staphylococcus lugdunensis Not Detected     Streptococcus species Not Detected     Streptococcus agalactiae Not Detected     Streptococcus pneumoniae Not Detected     Streptococcus pyogenes Not Detected     Acinetobacter calcoaceticus/baumannii complex Not Detected     Bacteroides fragilis Not Detected     Enterobacterales Not Detected     Enterobacter cloacae complex Not Detected     Escherichia coli Not Detected     Klebsiella aerogenes Not Detected     Klebsiella oxytoca Not Detected     Klebsiella pneumoniae group Not Detected     Proteus Not Detected     Salmonella sp Not Detected     Serratia marcescens Not Detected     Haemophilus influenzae Not Detected     Neisseria meningtidis Not Detected     Pseudomonas aeruginosa Not Detected     Stenotrophomonas maltophilia Not Detected     Candida albicans Not Detected     Candida auris Not Detected     Candida glabrata Not Detected     Candida krusei Not Detected     Candida parapsilosis Not Detected     Candida tropicalis Not Detected     Cryptococcus neoformans/gattii Not Detected     CTX-M (ESBL ) Test not applicable     IMP (Carbapenem resistant) Test not applicable     KPC resistance  gene (Carbapenem resistant) Test not applicable     mcr-1  Test not applicable     mec A/C  Test not applicable     mec A/C and MREJ (MRSA) gene Not Detected     NDM (Carbapenem resistant) Test not applicable     OXA-48-like (Carbapenem resistant) Test not applicable     van A/B (VRE gene) Test not applicable     VIM (Carbapenem resistant) Test not applicable    Narrative:      Aerobic and anaerobic    Blood culture x two cultures. Draw prior to antibiotics. [[id number]] Collected: 07/15/24 1802    Order Status: Completed Specimen: Blood from Peripheral, Antecubital, Right Updated: 07/16/24 1056     Blood Culture, Routine Gram stain aer bottle: Gram positive cocci      Results called to and read back by:Sagrario Cosby [id number];  07/16/2024      10:55 CJD    Narrative:      Aerobic and anaerobic          CURRENT/PREVIOUS VISIT EKG  Results for orders placed or performed during the hospital encounter of 07/15/24   EKG 12-lead    Collection Time: 07/15/24  5:47 PM   Result Value Ref Range    QRS Duration 98 ms    [facility] QTC Calculation 458 ms    Narrative    Test Reason : R00.0,    Vent. Rate : 119 BPM     Atrial Rate : 119 BPM     P-R Int : 140 ms          QRS Dur : 098 ms      QT Int : 326 ms       P-R-T Axes : 034 -66 080 degrees     QTc Int : 458 ms    Sinus tachycardia  Possible Left atrial enlargement  Incomplete right bundle branch block  Left anterior fascicular block  LVH with repolarization abnormality ( R in aVL , [place] product )  Abnormal ECG  When compared with ECG of 27-JUN-2023 03:11,  Incomplete right bundle branch block is now Present    Referred By: [email or web address]   SELF           Confirmed By:      Significant Imaging: I have reviewed all relevant and available imaging results/findings within the past 24 hours.    Kerwin Au MD  Date of Service: 07/16/2024      This note was created using [employer] voice recognition software that occasionally misinterpreted phrases or words.

## 2024-07-16 NOTE — ASSESSMENT & PLAN NOTE
"Patient with bacteremia secondary to indwelling catheter.  Nephrology consulted, will defer to them for further management of the patient's dialysis catheter.  No signs or symptoms sepsis or shock currently.  Continue IV antibiotics and follow-up cultures.    Antibiotics (From admission, onward)      Start     Stop Route Frequency Ordered    07/15/24 2408  vancomycin - pharmacy to dose  (vancomycin IVPB (PEDS and ADULTS))        Placed in "And" Linked Group    -- IV pharmacy to manage frequency 07/15/24 6115            "

## 2024-07-16 NOTE — ASSESSMENT & PLAN NOTE
Nephrology consulted. Continue Chronic hemodialysis. Monitor daily electrolytes and defer dialysis orders to nephrology.

## 2024-07-16 NOTE — HPI
44-year-old with past medical history of end-stage renal disease secondary to hypertension and diabetes who comes to the hospital after likely infection of his dialysis catheter.  Catheter was positive for Staphylococcus aureus.  Patient noted fever and chills on the night of admission.  He was noted to be febrile.  He did not appear to be in septic shock.  Patient was admitted for bacteremia, line infection, and ESRD management.

## 2024-07-16 NOTE — SUBJECTIVE & OBJECTIVE
Past Medical History:   Diagnosis Date    Allergy     Anemia     Anxiety     CHF (congestive heart failure)     Depression     High blood pressure with chronic kidney disease, stage 5 chronic kidney disease or end stage renal disease     Hypertension        Past Surgical History:   Procedure Laterality Date    FISTULOGRAM Bilateral 4/30/2024    Procedure: Fistulogram;  Surgeon: Khoobehi, Ali, MD;  Location: [facility] CATH/EP LAB;  Service: Vascular;  Laterality: Bilateral;    HERNIA REPAIR Right     With mesh    INSERTION, CATHETER, TUNNELED N/A 6/22/2023    Procedure: Insertion,catheter,tunneled;  Surgeon: Everett Caicedo MD;  Location: [facility] OR;  Service: General;  Laterality: N/A;    PERCUTANEOUS TRANSLUMINAL ANGIOPLASTY OF ARTERIOVENOUS FISTULA Left 4/30/2024    Procedure: PTA, AV FISTULA;  Surgeon: Khoobehi, Ali, MD;  Location: [facility] CATH/EP LAB;  Service: Vascular;  Laterality: Left;       Review of patient's allergies indicates:   Allergen Reactions    Mushroom Swelling     Tongue swells        No current facility-administered medications on file prior to encounter.     Current Outpatient Medications on File Prior to Encounter   Medication Sig    calcitRIOL (ROCALTROL) 0.25 MCG Cap Take 1 capsule by mouth once daily (Patient taking differently: Take 0.25 mcg by mouth once daily.)    calcium carbonate (TUMS) 200 mg calcium (500 mg) chewable tablet Take 2 tablets (1,000 mg total) by mouth 3 (three) times daily with meals. (Patient taking differently: Take 1,000 mg by mouth 3 (three) times daily.)    cholecalciferol, vitamin D3, 125 mcg (5,000 unit) Tab Take 1 tablet by mouth once daily.    cloNIDine (CATAPRES) 0.3 MG tablet TAKE 1 TABLET BY MOUTH THREE TIMES DAILY    ELIQUIS 2.5 mg Tab Take 2.5 mg by mouth 2 (two) times daily.    hydrALAZINE (APRESOLINE) 100 MG tablet Take 1 tablet (100 mg total) by mouth 3 (three) times daily.    isosorbide mononitrate (IMDUR) 120 MG 24 hr tablet Take 120 mg by mouth once daily.     metoprolol succinate (TOPROL-XL) 50 MG 24 hr tablet Take 150 mg by mouth once daily.    olmesartan (BENICAR) 40 MG tablet Take 1 tablet by mouth once daily.    sevelamer carbonate (RENVELA) 800 mg Tab Take 2 tablets (1,600 mg total) by mouth 3 (three) times daily with meals.    [DISCONTINUED] furosemide (LASIX) 80 MG tablet Take 80 mg by mouth once daily.    [DISCONTINUED] gabapentin (NEURONTIN) 100 MG capsule Take 1 capsule (100 mg total) by mouth 3 (three) times daily.    [DISCONTINUED] ondansetron (ZOFRAN-ODT) 4 MG TbDL Take 1 tablet (4 mg total) by mouth every 8 (eight) hours as needed (nausea).    [DISCONTINUED] oxyCODONE-acetaminophen (PERCOCET) 5-325 mg per tablet Take 1 tablet by mouth every 6 (six) hours as needed for Pain.     Family History    None       Tobacco Use    Smoking status: Never     Passive exposure: Never    Smokeless tobacco: Never   Substance and Sexual Activity    Alcohol use: Never    Drug use: Never    Sexual activity: Not Currently     Review of Systems   All other systems reviewed and are negative.    Objective:     Vital Signs (Most Recent):  Temp: 99.6 °F (37.6 °C) (07/15/24 2130)  Pulse: 96 (07/15/24 2230)  Resp: (!) 22 (07/15/24 2230)  BP: (!) 157/88 (07/15/24 2200)  SpO2: (!) 93 % (07/15/24 2230) Vital Signs (24h Range):  Temp:  [98.6 °F (37 °C)-102.3 °F (39.1 °C)] 99.6 °F (37.6 °C)  Pulse:  [] 96  Resp:  [14-22] 22  SpO2:  [91 %-99 %] 93 %  BP: (125-176)/() 157/88     Weight: 127 kg (280 lb)  Body mass index is 35.95 kg/m².     Physical Exam  Vitals and nursing note reviewed.   Constitutional:       Appearance: Normal appearance.   HENT:      Head: Normocephalic.      Nose: Nose normal.      Mouth/Throat:      Mouth: Mucous membranes are dry.   Eyes:      Extraocular Movements: Extraocular movements intact.      Pupils: Pupils are equal, round, and reactive to light.   Cardiovascular:      Rate and Rhythm: Normal rate and regular rhythm.      Pulses: Normal pulses.     "  Heart sounds: Normal heart sounds.   Pulmonary:      Effort: Pulmonary effort is normal.      Breath sounds: Normal breath sounds.   Abdominal:      General: Abdomen is flat. Bowel sounds are normal.      Palpations: Abdomen is soft.   Musculoskeletal:         General: Normal range of motion.      Cervical back: Normal range of motion.   Skin:     General: Skin is warm.      Capillary Refill: Capillary refill takes less than 2 seconds.   Neurological:      General: No focal deficit present.      Mental Status: He is alert and oriented to person, place, and time. Mental status is at baseline.   Psychiatric:         Mood and Affect: Mood normal.         Behavior: Behavior normal.              CRANIAL NERVES     CN III, IV, VI   Pupils are equal, round, and reactive to light.       Significant Labs: All pertinent labs within the past 24 hours have been reviewed.  CBC:   Recent Labs   Lab 07/15/24  1823   WBC 6.68   HGB 10.2*   HCT 31.2*        CMP:   Recent Labs   Lab 07/15/24  1823   *   K 3.9   CL 91*   CO2 28   *   BUN 22*   CREATININE 10.1*   CALCIUM 8.6*   PROT 8.7*   ALBUMIN 4.0   BILITOT 0.5   ALKPHOS 90   AST 58*   ALT 54*   ANIONGAP 15     Cardiac Markers: No results for input(s): "CKMB", "MYOGLOBIN", "BNP", "TROPISTAT" in the last 48 hours.  Coagulation: No results for input(s): "PT", "INR", "APTT" in the last 48 hours.  Lactic Acid: No results for input(s): "LACTATE" in the last 48 hours.  Lipase: No results for input(s): "LIPASE" in the last 48 hours.  Lipid Panel: No results for input(s): "CHOL", "HDL", "LDLCALC", "TRIG", "CHOLHDL" in the last 48 hours.  Magnesium: No results for input(s): "MG" in the last 48 hours.  Troponin: No results for input(s): "TROPONINI", "TROPONINIHS" in the last 48 hours.  TSH: No results for input(s): "TSH" in the last 4320 hours.  Urine Studies: No results for input(s): "COLORU", "APPEARANCEUA", "PHUR", "SPECGRAV", "PROTEINUA", "GLUCUA", "KETONESU", " ""BILIRUBINUA", "OCCULTUA", "NITRITE", "UROBILINOGEN", "LEUKOCYTESUR", "RBCUA", "WBCUA", "BACTERIA", "SQUAMEPITHEL", "HYALINECASTS" in the last 48 hours.    Invalid input(s): "WRIGHTSUR"    Significant Imaging: I have reviewed all pertinent imaging results/findings within the past 24 hours.  I have reviewed and interpreted all pertinent imaging results/findings within the past 24 hours.  "

## 2024-07-16 NOTE — CARE UPDATE
07/16/24 0745   Patient Assessment/Suction   Level of Consciousness (AVPU) alert   Respiratory Effort Normal;Unlabored   Expansion/Accessory Muscles/Retractions no use of accessory muscles;no retractions;expansion symmetric   All Lung Fields Breath Sounds clear   Rhythm/Pattern, Respiratory unlabored;depth regular;pattern regular   Cough Frequency no cough   PRE-TX-O2   Device (Oxygen Therapy) room air   SpO2 96 %   Pulse Oximetry Type Intermittent   $ Pulse Oximetry - Single Charge Pulse Oximetry - Single   Pulse 85   Resp 16   Temp 98.5 °F (36.9 °C)   /76   Education   $ Education Oxygen;15 min

## 2024-07-16 NOTE — ASSESSMENT & PLAN NOTE
Patient is identified as having Diastolic (HFpEF) heart failure that is Chronic. CHF is currently controlled. Latest ECHO performed and demonstrates- Results for orders placed during the hospital encounter of 06/25/24    Echo Saline Bubble? No    Interpretation Summary    Left Ventricle: The left ventricle is normal in size. Ventricular mass is normal. Moderately increased wall thickness. Mild septal thickening. There is concentric remodeling. Normal wall motion. There is normal systolic function with a visually estimated ejection fraction of 65 - 70%. Ejection fraction by visual approximation is 68%. There is normal diastolic function.    Right Ventricle: Normal right ventricular cavity size. Wall thickness is normal. Systolic function is normal.    Right Atrium: Right atrium is mildly dilated.    Aortic Valve: The aortic valve is a trileaflet valve.    Mitral Valve: There is trace regurgitation.    Tricuspid Valve: There is trace regurgitation.    Pulmonary Artery: The estimated pulmonary artery systolic pressure is 22 mmHg.    IVC/SVC: Normal venous pressure at 3 mmHg.    Pericardium: There is a trivial posterior effusion just very base.    . Continue Beta Blocker and monitor clinical status closely. Monitor on telemetry. Patient is off CHF pathway.  Monitor strict Is&Os and daily weights.  Place on fluid restriction of 1.5 L. Cardiology has not been consulted.

## 2024-07-16 NOTE — H&P
[facility] - Emergency Dept  Hospital Medicine  History & Physical    Patient Name: João Ham  MRN: [id number]  Patient Class: OP- Observation  Admission Date: 7/15/2024  Attending Physician: Neftali Cohn MD  Primary Care Provider: Dawson Granado MD         Patient information was obtained from patient and ER records.     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     Last week he had fevers on Friday so at dialysis they did blood cultures and gave him IV ABX including vancomycin .   He said he has felt bad since then.  More tired. Less appetite.   Still having chills and likely fevers.        He had HD today and then came in to the ER because of how he felt and his blood cultures were positive showing STAPH.       So in our ER he got IV Vancomycin and IV Zosyn       And the plan is to admit him to our service for the bacteremia.      He may need to remove his dialysis catheter and get a new one.     This is his second one.  He said first one  was infected and removed.     He has a fistula which is his second one in same arm Left.    First one more distal didn't work.  He had a clot in one of them and is now on eliquis for it.      Now he has a fistula that is over 6 months old on left.  Done in January 2024.    He says they are trying to use it slowly .           But otherwise he has no complaints     We will admit him to our service with nephrology on consult     Past Medical History:   Diagnosis Date    Allergy     Anemia     Anxiety     CHF (congestive heart failure)     Depression     High blood pressure with chronic kidney disease, stage 5 chronic  kidney disease or end stage renal disease     Hypertension        Past Surgical History:   Procedure Laterality Date    FISTULOGRAM Bilateral 4/30/2024    Procedure: Fistulogram;  Surgeon: Khoobehi, Ali, MD;  Location: [facility] CATH/EP LAB;  Service: Vascular;  Laterality: Bilateral;    HERNIA REPAIR Right     With mesh    INSERTION, CATHETER, TUNNELED N/A 6/22/2023    Procedure: Insertion,catheter,tunneled;  Surgeon: Everett Caicedo MD;  Location: [facility] OR;  Service: General;  Laterality: N/A;    PERCUTANEOUS TRANSLUMINAL ANGIOPLASTY OF ARTERIOVENOUS FISTULA Left 4/30/2024    Procedure: PTA, AV FISTULA;  Surgeon: Khoobehi, Ali, MD;  Location: [facility] CATH/EP LAB;  Service: Vascular;  Laterality: Left;       Review of patient's allergies indicates:   Allergen Reactions    Mushroom Swelling     Tongue swells        No current facility-administered medications on file prior to encounter.     Current Outpatient Medications on File Prior to Encounter   Medication Sig    calcitRIOL (ROCALTROL) 0.25 MCG Cap Take 1 capsule by mouth once daily (Patient taking differently: Take 0.25 mcg by mouth once daily.)    calcium carbonate (TUMS) 200 mg calcium (500 mg) chewable tablet Take 2 tablets (1,000 mg total) by mouth 3 (three) times daily with meals. (Patient taking differently: Take 1,000 mg by mouth 3 (three) times daily.)    cholecalciferol, vitamin D3, 125 mcg (5,000 unit) Tab Take 1 tablet by mouth once daily.    cloNIDine (CATAPRES) 0.3 MG tablet TAKE 1 TABLET BY MOUTH THREE TIMES DAILY    ELIQUIS 2.5 mg Tab Take 2.5 mg by mouth 2 (two) times daily.    hydrALAZINE (APRESOLINE) 100 MG tablet Take 1 tablet (100 mg total) by mouth 3 (three) times daily.    isosorbide mononitrate (IMDUR) 120 MG 24 hr tablet Take 120 mg by mouth once daily.    metoprolol succinate (TOPROL-XL) 50 MG 24 hr tablet Take 150 mg by mouth once daily.    olmesartan (BENICAR) 40 MG tablet Take 1 tablet by mouth once daily.    sevelamer carbonate (RENVELA)  800 mg Tab Take 2 tablets (1,600 mg total) by mouth 3 (three) times daily with meals.    [DISCONTINUED] furosemide (LASIX) 80 MG tablet Take 80 mg by mouth once daily.    [DISCONTINUED] gabapentin (NEURONTIN) 100 MG capsule Take 1 capsule (100 mg total) by mouth 3 (three) times daily.    [DISCONTINUED] ondansetron (ZOFRAN-ODT) 4 MG TbDL Take 1 tablet (4 mg total) by mouth every 8 (eight) hours as needed (nausea).    [DISCONTINUED] oxyCODONE-acetaminophen (PERCOCET) 5-325 mg per tablet Take 1 tablet by mouth every 6 (six) hours as needed for Pain.     Family History    None       Tobacco Use    Smoking status: Never     Passive exposure: Never    Smokeless tobacco: Never   Substance and Sexual Activity    Alcohol use: Never    Drug use: Never    Sexual activity: Not Currently     Review of Systems   All other systems reviewed and are negative.    Objective:     Vital Signs (Most Recent):  Temp: 99.6 °F (37.6 °C) (07/15/24 2130)  Pulse: 96 (07/15/24 2230)  Resp: (!) 22 (07/15/24 2230)  BP: (!) 157/88 (07/15/24 2200)  SpO2: (!) 93 % (07/15/24 2230) Vital Signs (24h Range):  Temp:  [98.6 °F (37 °C)-102.3 °F (39.1 °C)] 99.6 °F (37.6 °C)  Pulse:  [] 96  Resp:  [14-22] 22  SpO2:  [91 %-99 %] 93 %  BP: (125-176)/() 157/88     Weight: 127 kg (280 lb)  Body mass index is 35.95 kg/m².     Physical Exam  Vitals and nursing note reviewed.   Constitutional:       Appearance: Normal appearance.   HENT:      Head: Normocephalic.      Nose: Nose normal.      Mouth/Throat:      Mouth: Mucous membranes are dry.   Eyes:      Extraocular Movements: Extraocular movements intact.      Pupils: Pupils are equal, round, and reactive to light.   Cardiovascular:      Rate and Rhythm: Normal rate and regular rhythm.      Pulses: Normal pulses.      Heart sounds: Normal heart sounds.   Pulmonary:      Effort: Pulmonary effort is normal.      Breath sounds: Normal breath sounds.   Abdominal:      General: Abdomen is flat. Bowel sounds  "are normal.      Palpations: Abdomen is soft.   Musculoskeletal:         General: Normal range of motion.      Cervical back: Normal range of motion.   Skin:     General: Skin is warm.      Capillary Refill: Capillary refill takes less than 2 seconds.   Neurological:      General: No focal deficit present.      Mental Status: He is alert and oriented to person, place, and time. Mental status is at baseline.   Psychiatric:         Mood and Affect: Mood normal.         Behavior: Behavior normal.              CRANIAL NERVES     CN III, IV, VI   Pupils are equal, round, and reactive to light.       Significant Labs: All pertinent labs within the past 24 hours have been reviewed.  CBC:   Recent Labs   Lab 07/15/24  1823   WBC 6.68   HGB 10.2*   HCT 31.2*        CMP:   Recent Labs   Lab 07/15/24  1823   *   K 3.9   CL 91*   CO2 28   *   BUN 22*   CREATININE 10.1*   CALCIUM 8.6*   PROT 8.7*   ALBUMIN 4.0   BILITOT 0.5   ALKPHOS 90   AST 58*   ALT 54*   ANIONGAP 15     Cardiac Markers: No results for input(s): "CKMB", "MYOGLOBIN", "BNP", "TROPISTAT" in the last 48 hours.  Coagulation: No results for input(s): "PT", "INR", "APTT" in the last 48 hours.  Lactic Acid: No results for input(s): "LACTATE" in the last 48 hours.  Lipase: No results for input(s): "LIPASE" in the last 48 hours.  Lipid Panel: No results for input(s): "CHOL", "HDL", "LDLCALC", "TRIG", "CHOLHDL" in the last 48 hours.  Magnesium: No results for input(s): "MG" in the last 48 hours.  Troponin: No results for input(s): "TROPONINI", "TROPONINIHS" in the last 48 hours.  TSH: No results for input(s): "TSH" in the last 4320 hours.  Urine Studies: No results for input(s): "COLORU", "APPEARANCEUA", "PHUR", "SPECGRAV", "PROTEINUA", "GLUCUA", "KETONESU", "BILIRUBINUA", "OCCULTUA", "NITRITE", "UROBILINOGEN", "LEUKOCYTESUR", "RBCUA", "WBCUA", "BACTERIA", "SQUAMEPITHEL", "HYALINECASTS" in the last 48 hours.    Invalid input(s): " ""WRIGHTSUR"    Significant Imaging: I have reviewed all pertinent imaging results/findings within the past 24 hours.  I have reviewed and interpreted all pertinent imaging results/findings within the past 24 hours.  Assessment/Plan:     * Bacteremia    This is the main issue     I think we have to consider that his catheter is infected again   Especially if its MRSA   right now we only know its STAPH species     He's been getting IV Vancomycin since Friday in dialysis   And got more today with zosyn       PLAN    - cont. Vancomycin only     - consult nephrology     - we may need vascular or surgery for the catheter removal and new one but hold off for now until we have a plan tomorrow     - no IVFs at this time    - renal diet       - check cbc with diff and CRP in am labs   no reason to keep checking chemistry on him     unnecessary blood work will only worsen his anemia             ESRD (end stage renal disease)  I consulted nephrology   He had dialysis today     Labs are ok     I will continue all his renal medications from [facility]     And I did let Dr Garcia know as well       VTE Risk Mitigation (From admission, onward)           Ordered     Reason for No Pharmacological VTE Prophylaxis  Once        Question:  Reasons:  Answer:  Patient is Ambulatory    07/15/24 2214     IP VTE HIGH RISK PATIENT  Once         07/15/24 2214     Place sequential compression device  Until discontinued         07/15/24 2214                       On 07/15/2024, patient should be placed in hospital observation services under my care.        Pharmacist Renal Dose Adjustment Note    João Ham is a 44 y.o. male being treated with the medication FAMOTIDINE    Patient Data:    Vital Signs (Most Recent):  Temp: 99.6 °F (37.6 °C) (07/15/24 2130)  Pulse: 96 (07/15/24 2230)  Resp: (!) 22 (07/15/24 2230)  BP: (!) 157/88 (07/15/24 2200)  SpO2: (!) 93 % (07/15/24 2230) Vital Signs (72h Range):  Temp:  [98.6 °F (37 °C)-102.3 °F (39.1 °C)] "   Pulse:  []   Resp:  [14-22]   BP: (125-176)/()   SpO2:  [91 %-99 %]      Recent Labs   Lab 07/15/24  1823   CREATININE 10.1*     Serum creatinine: 10.1 mg/dL (H) 07/15/24 1823  Estimated creatinine clearance: 13.4 mL/min (A)    FAMOTIDINE 20 MG PO BID will be changed to FAMOTIDINE 20 MG Q24H .    Pharmacist's Name: Bety Malin  Pharmacist's Extension: [phone]         Neftali Cohn MD  Department of Hospital Medicine  Formerly [facility] - Emergency Dept

## 2024-07-16 NOTE — ASSESSMENT & PLAN NOTE
This is the main issue     I think we have to consider that his catheter is infected again   Especially if its MRSA   right now we only know its STAPH species     He's been getting IV Vancomycin since Friday in dialysis   And got more today with zosyn       PLAN    - cont. Vancomycin only     - consult nephrology     - we may need vascular or surgery for the catheter removal and new one but hold off for now until we have a plan tomorrow     - no IVFs at this time    - renal diet       - check cbc with diff and CRP in am labs   no reason to keep checking chemistry on him     unnecessary blood work will only worsen his anemia

## 2024-07-16 NOTE — CONSULTS
INPATIENT NEPHROLOGY CONSULT   [facility]    Patient Name: João Ham  Date: 07/16/2024    Reason for consultation: ESRD    Chief Complaint:   Chief Complaint   Patient presents with    Fever     ON FRIDAY AT DIALYSIS,     ABNORMAL LABS     POS BLOOD CULTURES DRAWN FROM DIALYSIS CATH FRIDAY       History of Present Illness:  44-year-old with past medical history of end-stage renal disease secondary to hypertension and diabetes on HD MWF who comes to the hospital after likely infection of his dialysis catheter. Patient noted fever and chills since Friday- got IV vanc at dialysis. Catheter was positive for Staphylococcus aureus. Patient was admitted for bacteremia, line infection, and ESRD management. Consulted for dialysis.    Interval History:  7/16- blood cx from admit positive- need to get TDC removed- will use AVF tomorrow and if works, will get line out- can use ancef- ordered another set of blood cx today- ordered echo    Plan of Care:    Assessment:  Sepsis due to MSSA bacteremia from infected dialysis catheter  ESRD on HD MWF  HTN  SHPT   Anemia CKD    Plan:    - use AVF tomorrow (emla ordered) - if works, remove TDC  - can switch IV vanc to ancef  - daily blood cx until neg; echo now  - HD MWF  - renal diet, 1.5L fluid restriction  - hold home BP meds for now- slowly resume if BP > 150/90  - continue binders, D3 and calcitriol  - ordered LOR with HD    Thank you for allowing us to participate in this patient's care. We will continue to follow.    Vital Signs:  Temp Readings from Last 3 Encounters:   07/16/24 99 °F (37.2 °C) (Axillary)   02/27/24 98.3 °F (36.8 °C) (Oral)   02/21/24 98.3 °F (36.8 °C) (Oral)       Pulse Readings from Last 3 Encounters:   07/16/24 83   06/25/24 90   04/30/24 63       BP Readings from Last 3 Encounters:   07/16/24 (!) 151/104   06/25/24 138/86   04/30/24 (!) 147/89       Weight:  Wt Readings from Last 3 Encounters:   07/15/24 128.3 kg (282 lb 14.4 oz)    06/25/24 129.3 kg (285 lb 0.9 oz)   04/30/24 129.3 kg (285 lb)       Past Medical & Surgical History:  Past Medical History:   Diagnosis Date    Allergy     Anemia     Anxiety     CHF (congestive heart failure)     Depression     High blood pressure with chronic kidney disease, stage 5 chronic kidney disease or end stage renal disease     Hypertension        Past Surgical History:   Procedure Laterality Date    FISTULOGRAM Bilateral 4/30/2024    Procedure: Fistulogram;  Surgeon: Khoobehi, Ali, MD;  Location: [facility] CATH/EP LAB;  Service: Vascular;  Laterality: Bilateral;    HERNIA REPAIR Right     With mesh    INSERTION, CATHETER, TUNNELED N/A 6/22/2023    Procedure: Insertion,catheter,tunneled;  Surgeon: Everett Caicedo MD;  Location: [facility] OR;  Service: General;  Laterality: N/A;    PERCUTANEOUS TRANSLUMINAL ANGIOPLASTY OF ARTERIOVENOUS FISTULA Left 4/30/2024    Procedure: PTA, AV FISTULA;  Surgeon: Khoobehi, Ali, MD;  Location: [facility] CATH/EP LAB;  Service: Vascular;  Laterality: Left;       Past Social History:  Social History     Socioeconomic History    Marital status: Single   Tobacco Use    Smoking status: Never     Passive exposure: Never    Smokeless tobacco: Never   Substance and Sexual Activity    Alcohol use: Never    Drug use: Never    Sexual activity: Not Currently   Social History Narrative    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     Social Determinants of Health     Financial Resource Strain: High Risk (1/3/2023)    Overall Financial Resource Strain (CARDIA)     Difficulty of Paying Living Expenses: Hard   Food Insecurity: No Food Insecurity (1/3/2023)    Hunger Vital Sign     Worried About Running Out of Food in the Last Year: Never true     Ran Out of Food in the Last Year: Never true   Transportation Needs: No Transportation Needs (1/3/2023)    PRAPARE - Transportation     Lack of Transportation (Medical): No     Lack of Transportation (Non-Medical): No   Physical Activity: Sufficiently Active (1/3/2023)     Exercise Vital Sign     Days of Exercise per Week: 6 days     Minutes of Exercise per Session: 60 min   Recent Concern: Physical Activity - Insufficiently Active (10/11/2022)    Exercise Vital Sign     Days of Exercise per Week: 3 days     Minutes of Exercise per Session: 30 min   Stress: Stress Concern Present (10/11/2022)    [employer] - Occupational Stress Questionnaire     Feeling of Stress : Very much   Housing Stability: Unknown (1/3/2023)    Housing Stability Vital Sign     Unstable Housing in the Last Year: No       Medications:  Scheduled Meds:   famotidine  20 mg Oral Daily    fluticasone propionate  2 spray Each Nostril Daily    guaiFENesin  600 mg Oral BID     Continuous Infusions:  PRN Meds:.  Current Facility-Administered Medications:     acetaminophen, 650 mg, Oral, Q8H PRN    acetaminophen, 650 mg, Oral, Q4H PRN    aluminum-magnesium hydroxide-simethicone, 30 mL, Oral, QID PRN    dextrose 50%, 12.5 g, Intravenous, PRN    dextrose 50%, 25 g, Intravenous, PRN    glucagon (human recombinant), 1 mg, Intramuscular, PRN    glucose, 16 g, Oral, PRN    glucose, 24 g, Oral, PRN    magnesium oxide, 800 mg, Oral, PRN    magnesium oxide, 800 mg, Oral, PRN    melatonin, 6 mg, Oral, Nightly PRN    naloxone, 0.02 mg, Intravenous, PRN    ondansetron, 4 mg, Intravenous, Q6H PRN    potassium bicarbonate, 35 mEq, Oral, PRN    potassium bicarbonate, 50 mEq, Oral, PRN    potassium bicarbonate, 60 mEq, Oral, PRN    potassium, sodium phosphates, 2 packet, Oral, PRN    potassium, sodium phosphates, 2 packet, Oral, PRN    potassium, sodium phosphates, 2 packet, Oral, PRN    sodium chloride 0.9%, 10 mL, Intravenous, Q12H PRN    Pharmacy to dose Vancomycin consult, , , Once **AND** vancomycin - pharmacy to dose, , Intravenous, pharmacy to manage frequency  No current facility-administered medications on file prior to encounter.     Current Outpatient Medications on File Prior to Encounter    Medication Sig Dispense Refill    calcitRIOL (ROCALTROL) 0.25 MCG Cap Take 1 capsule by mouth once daily (Patient taking differently: Take 0.25 mcg by mouth once daily.) 90 capsule 1    calcium carbonate (TUMS) 200 mg calcium (500 mg) chewable tablet Take 2 tablets (1,000 mg total) by mouth 3 (three) times daily with meals. (Patient taking differently: Take 1,000 mg by mouth 3 (three) times daily.) 180 tablet 11    cholecalciferol, vitamin D3, 125 mcg (5,000 unit) Tab Take 1 tablet by mouth once daily.      cloNIDine (CATAPRES) 0.3 MG tablet TAKE 1 TABLET BY MOUTH THREE TIMES DAILY 270 tablet 2    ELIQUIS 2.5 mg Tab Take 2.5 mg by mouth 2 (two) times daily.      hydrALAZINE (APRESOLINE) 100 MG tablet Take 1 tablet (100 mg total) by mouth 3 (three) times daily. 90 tablet 11    isosorbide mononitrate (IMDUR) 120 MG 24 hr tablet Take 120 mg by mouth once daily.      metoprolol succinate (TOPROL-XL) 50 MG 24 hr tablet Take 150 mg by mouth once daily.      olmesartan (BENICAR) 40 MG tablet Take 1 tablet by mouth once daily.      sevelamer carbonate (RENVELA) 800 mg Tab Take 2 tablets (1,600 mg total) by mouth 3 (three) times daily with meals. 180 tablet 11       Allergies:  Mushroom    Past Family History:  Reviewed; refer to Hospitalist Admission Note    Review of Systems:  Review of Systems - All 14 systems reviewed and negative, except as noted in HPI    Physical Exam:  General Appearance:    NAD, AAO x 3, cooperative, appears stated age   Head:    Normocephalic, atraumatic   Eyes:    PER, EOMI, and conjunctiva/sclera clear bilaterally       Mouth:   Moist mucus membranes, no thrush or oral lesions,       normal dentition   Back:     No CVA tenderness   Lungs:     Clear to auscultation bilaterally, no wheezes, crackles,           rales or rhonchi, symmetric air movement, respirations unlabored   Chest wall:    No tenderness or deformity   Heart:    Regular rate and rhythm, S1 and S2 normal, no murmur, rub   or  gallop   Abdomen:     Soft, non-tender, non-distended, bowel sounds active all four   quadrants, no RT or guarding, no masses, no organomegaly   Extremities:   Warm and well perfused, distal pulses are intact, no             cyanosis or peripheral edema   MSK:   No joint or muscle swelling, tenderness or deformity   Skin:   Skin color, texture, turgor normal, no rashes or lesions   Neurologic/Psychiatric:   CNII-XII intact, normal strength and sensation       throughout, no asterixis; normal affect, memory, judgement     and insight      Results:  Lab Results   Component Value Date     07/16/2024    K 3.9 07/16/2024    CL 94 (L) 07/16/2024    CO2 28 07/16/2024    BUN 28 (H) 07/16/2024    CREATININE 12.0 (H) 07/16/2024    CALCIUM 8.6 (L) 07/16/2024    ANIONGAP 15 07/16/2024       Lab Results   Component Value Date    CALCIUM 8.6 (L) 07/16/2024    PHOS 5.4 (H) 06/28/2023       Recent Labs   Lab 07/16/24  0511   WBC 6.52  6.52   RBC 2.83*  2.83*   HGB 9.1*  9.1*   HCT 28.3*  28.3*     216   *  100*   MCH 32.2*  32.2*   MCHC 32.2  32.2       I have personally reviewed pertinent radiological imaging and reports from this admission.    I have spent > 70 minutes providing care for this patient for the above diagnoses. These services have included chart/data/imaging review, evaluation, exam, formulation of plan, , note preparation, and discussions with staff involved in this patient's care.    Oni Garcia MD MPH  [facility] [place]  [address] [place] [id number]  [phone] (p)  [phone] (f)

## 2024-07-16 NOTE — PROGRESS NOTES
Pharmacist Renal Dose Adjustment Note    João Ham is a 44 y.o. male being treated with the medication FAMOTIDINE    Patient Data:    Vital Signs (Most Recent):  Temp: 99.6 °F (37.6 °C) (07/15/24 2130)  Pulse: 96 (07/15/24 2230)  Resp: (!) 22 (07/15/24 2230)  BP: (!) 157/88 (07/15/24 2200)  SpO2: (!) 93 % (07/15/24 2230) Vital Signs (72h Range):  Temp:  [98.6 °F (37 °C)-102.3 °F (39.1 °C)]   Pulse:  []   Resp:  [14-22]   BP: (125-176)/()   SpO2:  [91 %-99 %]      Recent Labs   Lab 07/15/24  1823   CREATININE 10.1*     Serum creatinine: 10.1 mg/dL (H) 07/15/24 1823  Estimated creatinine clearance: 13.4 mL/min (A)    FAMOTIDINE 20 MG PO BID will be changed to FAMOTIDINE 20 MG Q24H .    Pharmacist's Name: Bety Malin  Pharmacist's Extension: [phone]

## 2024-07-16 NOTE — ASSESSMENT & PLAN NOTE
Patient's anemia is currently stable. Has not received any PRBCs to date. Etiology likely d/t chronic disease due to ESRD  Current CBC reviewed-   Lab Results   Component Value Date    HGB 9.1 (L) 07/16/2024    HGB 9.1 (L) 07/16/2024    HCT 28.3 (L) 07/16/2024    HCT 28.3 (L) 07/16/2024     Monitor serial CBC and transfuse if patient becomes hemodynamically unstable, symptomatic or H/H drops below 7/21.

## 2024-07-16 NOTE — ASSESSMENT & PLAN NOTE
I consulted nephrology   He had dialysis today     Labs are ok     I will continue all his renal medications from [facility]     And I did let Dr Garcia know as well

## 2024-07-16 NOTE — ED NOTES
Telemetry Verification   Patient placed on Telemetry Box  Verified by BEKAH Braga RN  Box # [id number]   Monitor Tech  [place]   Rate    Rhythm

## 2024-07-16 NOTE — SUBJECTIVE & OBJECTIVE
Interval History:  Patient seen and examined.  No acute events overnight.  Long discussion in regards to plan of care.      Objective:     Vital Signs (Most Recent):  Temp: 99 °F (37.2 °C) (07/16/24 1145)  Pulse: 83 (07/16/24 1145)  Resp: 16 (07/16/24 1145)  BP: (!) 151/104 (07/16/24 1145)  SpO2: 96 % (07/16/24 1145) Vital Signs (24h Range):  Temp:  [98.5 °F (36.9 °C)-102.3 °F (39.1 °C)] 99 °F (37.2 °C)  Pulse:  [] 83  Resp:  [14-22] 16  SpO2:  [91 %-99 %] 96 %  BP: (125-176)/() 151/104     Weight: 128.3 kg (282 lb 14.4 oz)  Body mass index is 36.32 kg/m².    Intake/Output Summary (Last 24 hours) at 7/16/2024 1204  Last data filed at 7/16/2024 0911  Gross per 24 hour   Intake 460 ml   Output --   Net 460 ml         Physical Exam  Vitals and nursing note reviewed.   Eyes:      Pupils: Pupils are equal, round, and reactive to light.   Neck:      Vascular: No JVD.   Cardiovascular:      Rate and Rhythm: Normal rate and regular rhythm.      Heart sounds: Normal heart sounds.   Pulmonary:      Effort: Pulmonary effort is normal.      Breath sounds: Normal breath sounds.   Abdominal:      General: Bowel sounds are normal. There is no distension.      Palpations: Abdomen is soft.      Tenderness: There is no abdominal tenderness.   Musculoskeletal:         General: No deformity.   Lymphadenopathy:      Cervical: No cervical adenopathy.   Skin:     Coloration: Skin is not pale.      Findings: No rash.             Significant Labs: All pertinent labs within the past 24 hours have been reviewed.    Significant Imaging: I have reviewed all pertinent imaging results/findings within the past 24 hours.

## 2024-07-16 NOTE — PROGRESS NOTES
Pharmacist Renal Dose Adjustment Note    João Ham is a 44 y.o. male being treated with the medication ZOSYN    Patient Data:    Vital Signs (Most Recent):  Temp: (!) 101.6 °F (38.7 °C) (07/15/24 1945)  Pulse: 105 (07/15/24 2100)  Resp: 20 (07/15/24 2100)  BP: 126/84 (07/15/24 2100)  SpO2: 95 % (07/15/24 2100) Vital Signs (72h Range):  Temp:  [98.6 °F (37 °C)-102.3 °F (39.1 °C)]   Pulse:  [105-131]   Resp:  [14-20]   BP: (125-176)/()   SpO2:  [93 %-99 %]      Recent Labs   Lab 07/15/24  1823   CREATININE 10.1*     Serum creatinine: 10.1 mg/dL (H) 07/15/24 1823  Estimated creatinine clearance: 13.4 mL/min (A)    ZOSYN 4.5 gm frequency q8h  will be changed to ZOSYN 4.5 GM  Q12H    Pharmacist's Name: Bety Malin  Pharmacist's Extension:[phone]

## 2024-07-16 NOTE — NURSING
Nurses Note -- 4 Eyes      7/15/2024   11:54 PM      Skin assessed during: Admit      [x] No Altered Skin Integrity Present    [x]Prevention Measures Documented      [] Yes- Altered Skin Integrity Present or Discovered   [] LDA Added if Not in Epic (Describe Wound)   [] New Altered Skin Integrity was Present on Admit and Documented in LDA   [] Wound Image Taken    Wound Care Consulted? No    Attending Nurse:  Anju Hahn RN/Staff Member:  Ruth

## 2024-07-16 NOTE — PLAN OF CARE
[facility]  Initial Discharge Assessment    Assessment completed at bedside with Pt, and all information on FaceSheet confirmed, including demographics, PCP, pharmacy and insurance. Pt has not addressed advance directives. He currently lives in [place] with his nephew and other family members. His PCP is Dawson Granado MD, and last appointment was six months ago.  He denies any DME/HH/Blood Thinners Pt does receive dialysis services from [facility] on [address] MWF. Family/friends will transport back home after discharge. He denies any recent hospitalizations. Plan for discharge is to return home. Case Management to continue to follow for discharge planning needs.        Primary Care Provider: Dawson Granado MD    Admission Diagnosis: Sepsis [A41.9]    Admission Date: 7/15/2024  Expected Discharge Date: 7/19/2024    Transition of Care Barriers: None    Payor: BLUE CROSS BLUE SHIELD / Plan: BCBS ALL OUT OF STATE / Product Type: PPO /     Extended Emergency Contact Information  Primary Emergency Contact: Khushboo Guidry  Mobile Phone: [phone]  Relation: Grandparent  Preferred language: English   needed? No  Secondary Emergency Contact: janae escamilla  Mobile Phone: [phone]  Relation: Daughter    Discharge Plan A: Home  Discharge Plan B: Home      [facility] - [place], [place] - [address] [facility]  [address] [place] [id number]  Phone: [phone] Fax: [phone]      Initial Assessment (most recent)       Adult Discharge Assessment - 07/16/24 1300          Discharge Assessment    Assessment Type Discharge Planning Assessment     Confirmed/corrected address, phone number and insurance Yes     Confirmed Demographics Correct on Facesheet     Source of Information patient     When was your last doctors appointment? --   Six months ago    Does patient/caregiver understand observation status Yes     Reason For Admission Sepsis     People in Home other relative(s)      Do you expect to return to your current living situation? Yes     Do you have help at home or someone to help you manage your care at home? Yes     Who are your caregiver(s) and their phone number(s)? Khushboo Guidry (Grandparent)  [phone] (Mobile)     Prior to hospitilization cognitive status: Alert/Oriented     Current cognitive status: Alert/Oriented     Walking or Climbing Stairs Difficulty no     Dressing/Bathing Difficulty no     Home Accessibility wheelchair accessible     Home Layout Able to live on 1st floor     Equipment Currently Used at Home CPAP     Readmission within 30 days? No     Patient currently being followed by outpatient case management? No     Do you currently have service(s) that help you manage your care at home? No     Do you take prescription medications? Yes     Do you have prescription coverage? Yes     Coverage [facility] BLUE SHIELD - [facility] ALL OUT OF STATE     Do you have any problems affording any of your prescribed medications? No     Is the patient taking medications as prescribed? yes     Who is going to help you get home at discharge? Self     How do you get to doctors appointments? car, drives self     Are you on dialysis? Yes     Dialysis Name and Scheduled days [facility] on [address] MWF     Do you take coumadin? No     Discharge Plan A Home     Discharge Plan B Home     DME Needed Upon Discharge  none     Discharge Plan discussed with: Patient     Transition of Care Barriers None

## 2024-07-16 NOTE — PROGRESS NOTES
[facility]  Progress Note    Patient Name: João Ham  MRN: [id number]  Patient Class: OP- Observation   Admission Date: 7/15/2024  Length of Stay: 0 days  Attending Physician: Mayito Chase MD  Primary Care Provider: Dawson Granado MD        Subjective:     Principal Problem:Bacteremia        HPI:  44-year-old with past medical history of end-stage renal disease secondary to hypertension and diabetes who comes to the hospital after likely infection of his dialysis catheter.  Catheter was positive for Staphylococcus aureus.  Patient noted fever and chills on the night of admission.  He was noted to be febrile.  He did not appear to be in septic shock.  Patient was admitted for bacteremia, line infection, and ESRD management.    Overview/Hospital Course:  No notes on file    Interval History:  Patient seen and examined.  No acute events overnight.  Long discussion in regards to plan of care.      Objective:     Vital Signs (Most Recent):  Temp: 99 °F (37.2 °C) (07/16/24 1145)  Pulse: 83 (07/16/24 1145)  Resp: 16 (07/16/24 1145)  BP: (!) 151/104 (07/16/24 1145)  SpO2: 96 % (07/16/24 1145) Vital Signs (24h Range):  Temp:  [98.5 °F (36.9 °C)-102.3 °F (39.1 °C)] 99 °F (37.2 °C)  Pulse:  [] 83  Resp:  [14-22] 16  SpO2:  [91 %-99 %] 96 %  BP: (125-176)/() 151/104     Weight: 128.3 kg (282 lb 14.4 oz)  Body mass index is 36.32 kg/m².    Intake/Output Summary (Last 24 hours) at 7/16/2024 1204  Last data filed at 7/16/2024 0911  Gross per 24 hour   Intake 460 ml   Output --   Net 460 ml         Physical Exam  Vitals and nursing note reviewed.   Eyes:      Pupils: Pupils are equal, round, and reactive to light.   Neck:      Vascular: No JVD.   Cardiovascular:      Rate and Rhythm: Normal rate and regular rhythm.      Heart sounds: Normal heart sounds.   Pulmonary:      Effort: Pulmonary effort is normal.      Breath sounds: Normal breath sounds.   Abdominal:      General:  "Bowel sounds are normal. There is no distension.      Palpations: Abdomen is soft.      Tenderness: There is no abdominal tenderness.   Musculoskeletal:         General: No deformity.   Lymphadenopathy:      Cervical: No cervical adenopathy.   Skin:     Coloration: Skin is not pale.      Findings: No rash.             Significant Labs: All pertinent labs within the past 24 hours have been reviewed.    Significant Imaging: I have reviewed all pertinent imaging results/findings within the past 24 hours.    Assessment/Plan:      * Bacteremia  Patient with bacteremia secondary to indwelling catheter.  Nephrology consulted, will defer to them for further management of the patient's dialysis catheter.  No signs or symptoms sepsis or shock currently.  Continue IV antibiotics and follow-up cultures.    Antibiotics (From admission, onward)      Start     Stop Route Frequency Ordered    07/15/24 2316  vancomycin - pharmacy to dose  (vancomycin IVPB (PEDS and ADULTS))        Placed in "And" Linked Group    -- IV pharmacy to manage frequency 07/15/24 2217              ESRD (end stage renal disease)  Nephrology consulted. Continue Chronic hemodialysis. Monitor daily electrolytes and defer dialysis orders to nephrology.      Anemia of chronic disease  Patient's anemia is currently stable. Has not received any PRBCs to date. Etiology likely d/t chronic disease due to ESRD  Current CBC reviewed-   Lab Results   Component Value Date    HGB 9.1 (L) 07/16/2024    HGB 9.1 (L) 07/16/2024    HCT 28.3 (L) 07/16/2024    HCT 28.3 (L) 07/16/2024     Monitor serial CBC and transfuse if patient becomes hemodynamically unstable, symptomatic or H/H drops below 7/21.    Hypertensive heart and renal disease with congestive heart failure  Patient is identified as having Diastolic (HFpEF) heart failure that is Chronic. CHF is currently controlled. Latest ECHO performed and demonstrates- Results for orders placed during the hospital encounter of " 06/25/24    Echo Saline Bubble? No    Interpretation Summary    Left Ventricle: The left ventricle is normal in size. Ventricular mass is normal. Moderately increased wall thickness. Mild septal thickening. There is concentric remodeling. Normal wall motion. There is normal systolic function with a visually estimated ejection fraction of 65 - 70%. Ejection fraction by visual approximation is 68%. There is normal diastolic function.    Right Ventricle: Normal right ventricular cavity size. Wall thickness is normal. Systolic function is normal.    Right Atrium: Right atrium is mildly dilated.    Aortic Valve: The aortic valve is a trileaflet valve.    Mitral Valve: There is trace regurgitation.    Tricuspid Valve: There is trace regurgitation.    Pulmonary Artery: The estimated pulmonary artery systolic pressure is 22 mmHg.    IVC/SVC: Normal venous pressure at 3 mmHg.    Pericardium: There is a trivial posterior effusion just very base.    . Continue Beta Blocker and monitor clinical status closely. Monitor on telemetry. Patient is off CHF pathway.  Monitor strict Is&Os and daily weights.  Place on fluid restriction of 1.5 L. Cardiology has not been consulted.     Severe obesity with body mass index (BMI) of 35.0 to 39.9 with comorbidity  Body mass index is 36.32 kg/m². Morbid obesity complicates all aspects of disease management from diagnostic modalities to treatment. Weight loss encouraged and health benefits explained to patient.           VTE Risk Mitigation (From admission, onward)           Ordered     Reason for No Pharmacological VTE Prophylaxis  Once        Question:  Reasons:  Answer:  Patient is Ambulatory    07/15/24 2214     IP VTE HIGH RISK PATIENT  Once         07/15/24 2214     Place sequential compression device  Until discontinued         07/15/24 2214                    Discharge Planning   MARIA ISABEL: 7/19/2024     Code Status: Full Code   Is the patient medically ready for discharge?:     Reason for  patient still in hospital (select all that apply): Treatment                     Mayito Chase MD  Department of Hospital Medicine   [facility]

## 2024-07-17 PROBLEM — I10 ESSENTIAL HYPERTENSION: Status: ACTIVE | Noted: 2024-07-17

## 2024-07-17 LAB
ALBUMIN SERPL BCP-MCNC: 3.5 G/DL (ref 3.5–5.2)
ALP SERPL-CCNC: 71 U/L (ref 55–135)
ALT SERPL W/O P-5'-P-CCNC: 40 U/L (ref 10–44)
ANION GAP SERPL CALC-SCNC: 18 MMOL/L (ref 8–16)
AST SERPL-CCNC: 27 U/L (ref 10–40)
BACTERIA #/AREA URNS HPF: ABNORMAL /HPF
BASOPHILS # BLD AUTO: 0.03 K/UL (ref 0–0.2)
BASOPHILS NFR BLD: 0.4 % (ref 0–1.9)
BILIRUB SERPL-MCNC: 0.4 MG/DL (ref 0.1–1)
BILIRUB UR QL STRIP: NEGATIVE
BUN SERPL-MCNC: 35 MG/DL (ref 6–20)
CALCIUM SERPL-MCNC: 8.6 MG/DL (ref 8.7–10.5)
CHLORIDE SERPL-SCNC: 95 MMOL/L (ref 95–110)
CLARITY UR: ABNORMAL
CO2 SERPL-SCNC: 24 MMOL/L (ref 23–29)
COLOR UR: YELLOW
CREAT SERPL-MCNC: 14.1 MG/DL (ref 0.5–1.4)
DIFFERENTIAL METHOD BLD: ABNORMAL
EOSINOPHIL # BLD AUTO: 0.2 K/UL (ref 0–0.5)
EOSINOPHIL NFR BLD: 1.9 % (ref 0–8)
ERYTHROCYTE [DISTWIDTH] IN BLOOD BY AUTOMATED COUNT: 15.1 % (ref 11.5–14.5)
EST. GFR  (NO RACE VARIABLE): 4 ML/MIN/1.73 M^2
GLUCOSE SERPL-MCNC: 93 MG/DL (ref 70–110)
GLUCOSE UR QL STRIP: NEGATIVE
HCT VFR BLD AUTO: 28.4 % (ref 40–54)
HGB BLD-MCNC: 9.2 G/DL (ref 14–18)
HGB UR QL STRIP: ABNORMAL
HYALINE CASTS #/AREA URNS LPF: 0 /LPF
IMM GRANULOCYTES # BLD AUTO: 0.06 K/UL (ref 0–0.04)
IMM GRANULOCYTES NFR BLD AUTO: 0.7 % (ref 0–0.5)
KETONES UR QL STRIP: NEGATIVE
LEUKOCYTE ESTERASE UR QL STRIP: ABNORMAL
LYMPHOCYTES # BLD AUTO: 1.7 K/UL (ref 1–4.8)
LYMPHOCYTES NFR BLD: 20.1 % (ref 18–48)
MAGNESIUM SERPL-MCNC: 1.7 MG/DL (ref 1.6–2.6)
MCH RBC QN AUTO: 31.8 PG (ref 27–31)
MCHC RBC AUTO-ENTMCNC: 32.4 G/DL (ref 32–36)
MCV RBC AUTO: 98 FL (ref 82–98)
MICROSCOPIC COMMENT: ABNORMAL
MONOCYTES # BLD AUTO: 1.8 K/UL (ref 0.3–1)
MONOCYTES NFR BLD: 21.6 % (ref 4–15)
NEUTROPHILS # BLD AUTO: 4.6 K/UL (ref 1.8–7.7)
NEUTROPHILS NFR BLD: 55.3 % (ref 38–73)
NITRITE UR QL STRIP: NEGATIVE
NRBC BLD-RTO: 0 /100 WBC
PH UR STRIP: 6 [PH] (ref 5–8)
PLATELET # BLD AUTO: 252 K/UL (ref 150–450)
PMV BLD AUTO: 10.6 FL (ref 9.2–12.9)
POTASSIUM SERPL-SCNC: 3.6 MMOL/L (ref 3.5–5.1)
PROT SERPL-MCNC: 7.6 G/DL (ref 6–8.4)
PROT UR QL STRIP: ABNORMAL
RBC # BLD AUTO: 2.89 M/UL (ref 4.6–6.2)
RBC #/AREA URNS HPF: 6 /HPF (ref 0–4)
SODIUM SERPL-SCNC: 137 MMOL/L (ref 136–145)
SP GR UR STRIP: 1.02 (ref 1–1.03)
SQUAMOUS #/AREA URNS HPF: 0 /HPF
URN SPEC COLLECT METH UR: ABNORMAL
UROBILINOGEN UR STRIP-ACNC: NEGATIVE EU/DL
WBC # BLD AUTO: 8.24 K/UL (ref 3.9–12.7)
WBC #/AREA URNS HPF: 52 /HPF (ref 0–5)

## 2024-07-17 PROCEDURE — 36415 COLL VENOUS BLD VENIPUNCTURE: CPT | Performed by: INTERNAL MEDICINE

## 2024-07-17 PROCEDURE — 63600175 PHARM REV CODE 636 W HCPCS: Mod: JZ,JG | Performed by: INTERNAL MEDICINE

## 2024-07-17 PROCEDURE — 80053 COMPREHEN METABOLIC PANEL: CPT | Performed by: INTERNAL MEDICINE

## 2024-07-17 PROCEDURE — 25000003 PHARM REV CODE 250: Performed by: INTERNAL MEDICINE

## 2024-07-17 PROCEDURE — 25000003 PHARM REV CODE 250: Performed by: HOSPITALIST

## 2024-07-17 PROCEDURE — 87086 URINE CULTURE/COLONY COUNT: CPT | Performed by: EMERGENCY MEDICINE

## 2024-07-17 PROCEDURE — 85025 COMPLETE CBC W/AUTO DIFF WBC: CPT | Performed by: INTERNAL MEDICINE

## 2024-07-17 PROCEDURE — 63600175 PHARM REV CODE 636 W HCPCS: Performed by: HOSPITALIST

## 2024-07-17 PROCEDURE — 90935 HEMODIALYSIS ONE EVALUATION: CPT

## 2024-07-17 PROCEDURE — 83735 ASSAY OF MAGNESIUM: CPT | Performed by: INTERNAL MEDICINE

## 2024-07-17 PROCEDURE — 21400001 HC TELEMETRY ROOM

## 2024-07-17 PROCEDURE — 81001 URINALYSIS AUTO W/SCOPE: CPT | Performed by: EMERGENCY MEDICINE

## 2024-07-17 RX ORDER — SEVELAMER CARBONATE 800 MG/1
1600 TABLET, FILM COATED ORAL
Status: DISCONTINUED | OUTPATIENT
Start: 2024-07-17 | End: 2024-07-18

## 2024-07-17 RX ORDER — ACETAMINOPHEN 500 MG
1 TABLET ORAL DAILY
Status: DISCONTINUED | OUTPATIENT
Start: 2024-07-17 | End: 2024-07-17

## 2024-07-17 RX ORDER — CALCITRIOL 0.25 UG/1
0.25 CAPSULE ORAL DAILY
Status: DISCONTINUED | OUTPATIENT
Start: 2024-07-17 | End: 2024-07-17

## 2024-07-17 RX ORDER — METOPROLOL SUCCINATE 50 MG/1
150 TABLET, EXTENDED RELEASE ORAL DAILY
Status: DISCONTINUED | OUTPATIENT
Start: 2024-07-17 | End: 2024-07-19 | Stop reason: HOSPADM

## 2024-07-17 RX ORDER — CEFAZOLIN SODIUM 1 G/3ML
500 INJECTION, POWDER, FOR SOLUTION INTRAMUSCULAR; INTRAVENOUS
Status: DISCONTINUED | OUTPATIENT
Start: 2024-07-17 | End: 2024-07-17

## 2024-07-17 RX ORDER — CALCIUM CARBONATE 200(500)MG
1000 TABLET,CHEWABLE ORAL
Status: DISCONTINUED | OUTPATIENT
Start: 2024-07-17 | End: 2024-07-17

## 2024-07-17 RX ORDER — HYDRALAZINE HYDROCHLORIDE 25 MG/1
100 TABLET, FILM COATED ORAL 3 TIMES DAILY
Status: DISCONTINUED | OUTPATIENT
Start: 2024-07-17 | End: 2024-07-19 | Stop reason: HOSPADM

## 2024-07-17 RX ORDER — CEFAZOLIN SODIUM 1 G/50ML
1 SOLUTION INTRAVENOUS
Status: DISCONTINUED | OUTPATIENT
Start: 2024-07-17 | End: 2024-07-17

## 2024-07-17 RX ORDER — CEFAZOLIN SODIUM 1 G/50ML
1 SOLUTION INTRAVENOUS
Status: DISCONTINUED | OUTPATIENT
Start: 2024-07-17 | End: 2024-07-19 | Stop reason: HOSPADM

## 2024-07-17 RX ORDER — CLONIDINE HYDROCHLORIDE 0.1 MG/1
0.3 TABLET ORAL 3 TIMES DAILY
Status: DISCONTINUED | OUTPATIENT
Start: 2024-07-17 | End: 2024-07-19 | Stop reason: HOSPADM

## 2024-07-17 RX ADMIN — MUPIROCIN 1 G: 20 OINTMENT TOPICAL at 08:07

## 2024-07-17 RX ADMIN — METOPROLOL SUCCINATE 150 MG: 50 TABLET, FILM COATED, EXTENDED RELEASE ORAL at 09:07

## 2024-07-17 RX ADMIN — LIDOCAINE AND PRILOCAINE: 25; 25 CREAM TOPICAL at 01:07

## 2024-07-17 RX ADMIN — FLUTICASONE PROPIONATE 100 MCG: 50 SPRAY, METERED NASAL at 08:07

## 2024-07-17 RX ADMIN — CLONIDINE HYDROCHLORIDE 0.3 MG: 0.1 TABLET ORAL at 08:07

## 2024-07-17 RX ADMIN — CLONIDINE HYDROCHLORIDE 0.3 MG: 0.1 TABLET ORAL at 09:07

## 2024-07-17 RX ADMIN — CEFAZOLIN SODIUM 1 G: 1 SOLUTION INTRAVENOUS at 08:07

## 2024-07-17 RX ADMIN — MUPIROCIN 1 G: 20 OINTMENT TOPICAL at 01:07

## 2024-07-17 RX ADMIN — HYDRALAZINE HYDROCHLORIDE 100 MG: 25 TABLET ORAL at 09:07

## 2024-07-17 RX ADMIN — HYDRALAZINE HYDROCHLORIDE 100 MG: 25 TABLET ORAL at 08:07

## 2024-07-17 RX ADMIN — GUAIFENESIN 600 MG: 600 TABLET, EXTENDED RELEASE ORAL at 08:07

## 2024-07-17 RX ADMIN — FAMOTIDINE 20 MG: 20 TABLET ORAL at 08:07

## 2024-07-17 RX ADMIN — EPOETIN ALFA-EPBX 6400 UNITS: 10000 INJECTION, SOLUTION INTRAVENOUS; SUBCUTANEOUS at 04:07

## 2024-07-17 RX ADMIN — Medication 6 MG: at 08:07

## 2024-07-17 NOTE — ASSESSMENT & PLAN NOTE
Chronic, controlled. Latest blood pressure and vitals reviewed-     Temp:  [98.4 °F (36.9 °C)-99.4 °F (37.4 °C)]   Pulse:  [71-97]   Resp:  [15-17]   BP: (127-196)/()   SpO2:  [95 %-98 %] .   Home meds for hypertension were reviewed and noted below.   Hypertension Medications               cloNIDine (CATAPRES) 0.3 MG tablet TAKE 1 TABLET BY MOUTH THREE TIMES DAILY    hydrALAZINE (APRESOLINE) 100 MG tablet Take 1 tablet (100 mg total) by mouth 3 (three) times daily.    isosorbide mononitrate (IMDUR) 120 MG 24 hr tablet Take 120 mg by mouth once daily.    metoprolol succinate (TOPROL-XL) 50 MG 24 hr tablet Take 150 mg by mouth once daily.    olmesartan (BENICAR) 40 MG tablet Take 1 tablet by mouth once daily.            While in the hospital, will manage blood pressure as follows; Continue home antihypertensive regimen    Will utilize p.r.n. blood pressure medication only if patient's blood pressure greater than 180/110 and he develops symptoms such as worsening chest pain or shortness of breath.

## 2024-07-17 NOTE — PROGRESS NOTES
INPATIENT NEPHROLOGY Progress  [facility]    Patient Name: João Ham  Date: 07/17/2024    Reason for consultation: ESRD    Chief Complaint:   Chief Complaint   Patient presents with    Fever     ON FRIDAY AT DIALYSIS,     ABNORMAL LABS     POS BLOOD CULTURES DRAWN FROM DIALYSIS CATH FRIDAY       History of Present Illness:  44-year-old with past medical history of end-stage renal disease secondary to hypertension and diabetes on HD MWF who comes to the hospital after likely infection of his dialysis catheter. Patient noted fever and chills since Friday- got IV vanc at dialysis. Catheter was positive for Staphylococcus aureus. Patient was admitted for bacteremia, line infection, and ESRD management. Consulted for dialysis.    Interval History:  7/16- blood cx from admit positive- need to get TDC removed- will use AVF tomorrow and if works, will get line out- can use ancef- ordered another set of blood cx today- ordered echo  7/17  pressures a little high today, will UF as tolerated.  Lab results reviewed.  Hopefully, will be able to use AVF today.. No complaints.    Plan of Care:    Assessment:  Sepsis due to MSSA bacteremia from infected dialysis catheter  ESRD on HD MWF  HTN  SHPT   Anemia CKD    Plan:    - use AVF tomorrow (emla ordered) - if works, remove TDC  - can switch IV vanc to ancef  - daily blood cx until neg; echo now  - HD MWF  - renal diet, 1.5L fluid restriction  - hold home BP meds for now- slowly resume if BP > 150/90  - continue binders, D3 and calcitriol  - ordered LOR with HD    Thank you for allowing us to participate in this patient's care. We will continue to follow.    Vital Signs:  Temp Readings from Last 3 Encounters:   07/17/24 98.8 °F (37.1 °C) (Oral)   02/27/24 98.3 °F (36.8 °C) (Oral)   02/21/24 98.3 °F (36.8 °C) (Oral)       Pulse Readings from Last 3 Encounters:   07/17/24 86   06/25/24 90   04/30/24 63       BP Readings from Last 3 Encounters:   07/17/24 (!)  166/94   06/25/24 138/86   04/30/24 (!) 147/89       Weight:  Wt Readings from Last 3 Encounters:   07/17/24 129.3 kg (285 lb)   07/16/24 128.3 kg (282 lb 13.6 oz)   06/25/24 129.3 kg (285 lb 0.9 oz)       Past Medical & Surgical History:  Past Medical History:   Diagnosis Date    Allergy     Anemia     Anxiety     CHF (congestive heart failure)     Depression     High blood pressure with chronic kidney disease, stage 5 chronic kidney disease or end stage renal disease     Hypertension        Past Surgical History:   Procedure Laterality Date    FISTULOGRAM Bilateral 4/30/2024    Procedure: Fistulogram;  Surgeon: Khoobehi, Ali, MD;  Location: [facility] CATH/EP LAB;  Service: Vascular;  Laterality: Bilateral;    HERNIA REPAIR Right     With mesh    INSERTION, CATHETER, TUNNELED N/A 6/22/2023    Procedure: Insertion,catheter,tunneled;  Surgeon: Everett Caicedo MD;  Location: [facility] OR;  Service: General;  Laterality: N/A;    PERCUTANEOUS TRANSLUMINAL ANGIOPLASTY OF ARTERIOVENOUS FISTULA Left 4/30/2024    Procedure: PTA, AV FISTULA;  Surgeon: Khoobehi, Ali, MD;  Location: [facility] CATH/EP LAB;  Service: Vascular;  Laterality: Left;       Past Social History:  Social History     Socioeconomic History    Marital status: Single   Tobacco Use    Smoking status: Never     Passive exposure: Never    Smokeless tobacco: Never   Substance and Sexual Activity    Alcohol use: Never    Drug use: Never    Sexual activity: Not Currently   Social History Narrative    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     Social Determinants of Health     Financial Resource Strain: High Risk (1/3/2023)    Overall Financial Resource Strain (CARDIA)     Difficulty of Paying Living Expenses: Hard   Food Insecurity: No Food Insecurity (1/3/2023)    Hunger Vital Sign     Worried About Running Out of Food in the Last Year: Never true     Ran Out of Food in the Last Year: Never true   Transportation Needs: No Transportation Needs (1/3/2023)    PRAPARE - Transportation     Lack  of Transportation (Medical): No     Lack of Transportation (Non-Medical): No   Physical Activity: Sufficiently Active (1/3/2023)    Exercise Vital Sign     Days of Exercise per Week: 6 days     Minutes of Exercise per Session: 60 min   Recent Concern: Physical Activity - Insufficiently Active (10/11/2022)    Exercise Vital Sign     Days of Exercise per Week: 3 days     Minutes of Exercise per Session: 30 min   Stress: Stress Concern Present (10/11/2022)    [facility]ational [facility] - Occupational Stress Questionnaire     Feeling of Stress : Very much   Housing Stability: Unknown (1/3/2023)    Housing Stability Vital Sign     Unstable Housing in the Last Year: No       Medications:  Scheduled Meds:   ceFAZolin 500 mg in D5W 100 mL   Intravenous Q24H    cloNIDine  0.3 mg Oral TID    epoetin mily-ebpx (RETACRIT) injection  50 Units/kg Intravenous Every Mon, Wed, Fri    famotidine  20 mg Oral Daily    fluticasone propionate  2 spray Each Nostril Daily    guaiFENesin  600 mg Oral BID    hydrALAZINE  100 mg Oral TID    LIDOcaine-prilocaine   Topical (Top) Every Mon, Wed, Fri    metoprolol succinate  150 mg Oral Daily    mupirocin   Nasal BID    sevelamer carbonate  1,600 mg Oral TID WM     Continuous Infusions:  PRN Meds:.  Current Facility-Administered Medications:     acetaminophen, 650 mg, Oral, Q4H PRN    aluminum-magnesium hydroxide-simethicone, 30 mL, Oral, QID PRN    dextrose 50%, 12.5 g, Intravenous, PRN    dextrose 50%, 25 g, Intravenous, PRN    glucagon (human recombinant), 1 mg, Intramuscular, PRN    glucose, 16 g, Oral, PRN    glucose, 24 g, Oral, PRN    magnesium oxide, 800 mg, Oral, PRN    magnesium oxide, 800 mg, Oral, PRN    melatonin, 6 mg, Oral, Nightly PRN    naloxone, 0.02 mg, Intravenous, PRN    ondansetron, 4 mg, Intravenous, Q6H PRN    potassium bicarbonate, 35 mEq, Oral, PRN    potassium bicarbonate, 50 mEq, Oral, PRN    potassium bicarbonate, 60 mEq, Oral, PRN    potassium, sodium  phosphates, 2 packet, Oral, PRN    potassium, sodium phosphates, 2 packet, Oral, PRN    potassium, sodium phosphates, 2 packet, Oral, PRN    sodium chloride 0.9%, 10 mL, Intravenous, Q12H PRN  No current facility-administered medications on file prior to encounter.     Current Outpatient Medications on File Prior to Encounter   Medication Sig Dispense Refill    calcitRIOL (ROCALTROL) 0.25 MCG Cap Take 1 capsule by mouth once daily (Patient taking differently: Take 0.25 mcg by mouth once daily.) 90 capsule 1    calcium carbonate (TUMS) 200 mg calcium (500 mg) chewable tablet Take 2 tablets (1,000 mg total) by mouth 3 (three) times daily with meals. (Patient taking differently: Take 1,000 mg by mouth 3 (three) times daily.) 180 tablet 11    cholecalciferol, vitamin D3, 125 mcg (5,000 unit) Tab Take 1 tablet by mouth once daily.      cloNIDine (CATAPRES) 0.3 MG tablet TAKE 1 TABLET BY MOUTH THREE TIMES DAILY 270 tablet 2    ELIQUIS 2.5 mg Tab Take 2.5 mg by mouth 2 (two) times daily.      hydrALAZINE (APRESOLINE) 100 MG tablet Take 1 tablet (100 mg total) by mouth 3 (three) times daily. 90 tablet 11    isosorbide mononitrate (IMDUR) 120 MG 24 hr tablet Take 120 mg by mouth once daily.      metoprolol succinate (TOPROL-XL) 50 MG 24 hr tablet Take 150 mg by mouth once daily.      olmesartan (BENICAR) 40 MG tablet Take 1 tablet by mouth once daily.      sevelamer carbonate (RENVELA) 800 mg Tab Take 2 tablets (1,600 mg total) by mouth 3 (three) times daily with meals. 180 tablet 11       Allergies:  Mushroom    Past Family History:  Reviewed; refer to Hospitalist Admission Note    Review of Systems:  Review of Systems - All 14 systems reviewed and negative, except as noted in HPI    Physical Exam:  General Appearance:    NAD, AAO x 3, cooperative, appears stated age   Head:    Normocephalic, atraumatic   Eyes:    PER, EOMI, and conjunctiva/sclera clear bilaterally       Mouth:   Moist mucus membranes, no thrush or oral  lesions,       normal dentition   Back:     No CVA tenderness   Lungs:     Clear to auscultation bilaterally, no wheezes, crackles,           rales or rhonchi, symmetric air movement, respirations unlabored   Chest wall:    No tenderness or deformity   Heart:    Regular rate and rhythm, S1 and S2 normal, no murmur, rub   or gallop   Abdomen:     Soft, non-tender, non-distended, bowel sounds active all four   quadrants, no RT or guarding, no masses, no organomegaly   Extremities:   Warm and well perfused, distal pulses are intact, no             cyanosis or peripheral edema   MSK:   No joint or muscle swelling, tenderness or deformity   Skin:   Skin color, texture, turgor normal, no rashes or lesions   Neurologic/Psychiatric:   CNII-XII intact, normal strength and sensation       throughout, no asterixis; normal affect, memory, judgement     and insight      Results:  Lab Results   Component Value Date     07/17/2024    K 3.6 07/17/2024    CL 95 07/17/2024    CO2 24 07/17/2024    BUN 35 (H) 07/17/2024    CREATININE 14.1 (H) 07/17/2024    CALCIUM 8.6 (L) 07/17/2024    ANIONGAP 18 (H) 07/17/2024       Lab Results   Component Value Date    CALCIUM 8.6 (L) 07/17/2024    PHOS 5.4 (H) 06/28/2023       Recent Labs   Lab 07/17/24  0435   WBC 8.24   RBC 2.89*   HGB 9.2*   HCT 28.4*      MCV 98   MCH 31.8*   MCHC 32.4       I have personally reviewed pertinent radiological imaging and reports from this admission.    I have spent > 70 minutes providing care for this patient for the above diagnoses. These services have included chart/data/imaging review, evaluation, exam, formulation of plan, , note preparation, and discussions with staff involved in this patient's care.    Kellie Doty NP    [facility] [address] [place] [address]  [phone] (p)  [phone] (f)

## 2024-07-17 NOTE — PROGRESS NOTES
Pharmacokinetic Assessment Follow Up: IV Vancomycin    Vancomycin serum concentration assessment(s):    The random level was drawn correctly and can be used to guide therapy at this time. The measurement is above the desired definitive target range of 15 to 20 mcg/mL.    Vancomycin Regimen Plan:    Discontinue the scheduled vancomycin regimen and re-dose when the random level is less than 20 mcg/mL, next level to be drawn at 0430 on 7/19.    Drug levels (last 3 results):  Recent Labs   Lab Result Units 07/16/24  1958   Vancomycin-Trough ug/mL 27.5*       Pharmacy will continue to follow and monitor vancomycin.    Please contact pharmacy at extension [phone] for questions regarding this assessment.    Thank you for the consult,   Wendy Lemus       Patient brief summary:  João Ham is a 44 y.o. male initiated on antimicrobial therapy with IV Vancomycin for treatment of bacteremia    The patient's current regimen is 1000mg pulse dosing r/t HD    Drug Allergies:   Review of patient's allergies indicates:   Allergen Reactions    Mushroom Swelling     Tongue swells        Actual Body Weight:   128.3kg    Renal Function:   Estimated Creatinine Clearance: 11.3 mL/min (A) (based on SCr of 12 mg/dL (H)).,     Dialysis Method (if applicable):  intermittent HD MWF    CBC (last 72 hours):  Recent Labs   Lab Result Units 07/15/24  1823 07/16/24  0510 07/16/24  0511   WBC K/uL 6.68  --  6.52  6.52   Hemoglobin g/dL 10.2*  --  9.1*  9.1*   Hemoglobin A1C %  --  6.1  --    Hematocrit % 31.2*  --  28.3*  28.3*   Platelets K/uL 225  --  216  216   Gran % % 57.8  --  52.2  52.2   Lymph % % 17.4*  --  17.2*  17.2*   Mono % % 23.7*  --  28.5*  28.5*   Eosinophil % % 0.7  --  1.1  1.1   Basophil % % 0.1  --  0.5  0.5   Differential Method  Automated  --  Automated  Automated       Metabolic Panel (last 72 hours):  Recent Labs   Lab Result Units 07/15/24  1823 07/16/24  0510   Sodium mmol/L 134* 137   Potassium mmol/L 3.9 3.9    Chloride mmol/L 91* 94*   CO2 mmol/L 28 28   Glucose mg/dL 124* 105   BUN mg/dL 22* 28*   Creatinine mg/dL 10.1* 12.0*   Albumin g/dL 4.0 3.6   Total Bilirubin mg/dL 0.5 0.5   Alkaline Phosphatase U/L 90 80   AST U/L 58* 53*   ALT U/L 54* 52*   Magnesium mg/dL  --  1.8       Vancomycin Administrations:  vancomycin given in the last 96 hours                     vancomycin in dextrose 5 % 1 gram/250 mL IVPB 1,000 mg (mg) 1,000 mg New Bag 07/15/24 2100                    Microbiologic Results:  Microbiology Results (last 7 days)       Procedure Component Value Units Date/Time    Blood culture [[id number]] Collected: 07/16/24 1545    Order Status: Sent Specimen: Blood from Peripheral, Forearm, Right Updated: 07/16/24 1632    Blood culture x two cultures. Draw prior to antibiotics. [[id number]] Collected: 07/15/24 1756    Order Status: Completed Specimen: Blood from Peripheral, Hand, Right Updated: 07/16/24 1231     Blood Culture, Routine Gram stain aer bottle: Gram positive cocci      Positive results previously called on Order #[id number]    Narrative:      Aerobic and anaerobic    Rapid Organism ID by PCR (from Blood culture) [[id number]]  (Abnormal) Collected: 07/15/24 1802    Order Status: Completed Updated: 07/16/24 1226     Enterococcus faecalis Not Detected     Enterococcus faecium Not Detected     Listeria monocytogenes Not Detected     Staphylococcus spp. See species for ID     Staphylococcus aureus Detected     Staphylococcus epidermidis Not Detected     Staphylococcus lugdunensis Not Detected     Streptococcus species Not Detected     Streptococcus agalactiae Not Detected     Streptococcus pneumoniae Not Detected     Streptococcus pyogenes Not Detected     Acinetobacter calcoaceticus/baumannii complex Not Detected     Bacteroides fragilis Not Detected     Enterobacterales Not Detected     Enterobacter cloacae complex Not Detected     Escherichia coli Not Detected     Klebsiella aerogenes Not Detected      Klebsiella oxytoca Not Detected     Klebsiella pneumoniae group Not Detected     Proteus Not Detected     Salmonella sp Not Detected     Serratia marcescens Not Detected     Haemophilus influenzae Not Detected     Neisseria meningtidis Not Detected     Pseudomonas aeruginosa Not Detected     Stenotrophomonas maltophilia Not Detected     Candida albicans Not Detected     Candida auris Not Detected     Candida glabrata Not Detected     Candida krusei Not Detected     Candida parapsilosis Not Detected     Candida tropicalis Not Detected     Cryptococcus neoformans/gattii Not Detected     CTX-M (ESBL ) Test not applicable     IMP (Carbapenem resistant) Test not applicable     KPC resistance gene (Carbapenem resistant) Test not applicable     mcr-1  Test not applicable     mec A/C  Test not applicable     mec A/C and MREJ (MRSA) gene Not Detected     NDM (Carbapenem resistant) Test not applicable     OXA-48-like (Carbapenem resistant) Test not applicable     van A/B (VRE gene) Test not applicable     VIM (Carbapenem resistant) Test not applicable    Narrative:      Aerobic and anaerobic    Blood culture x two cultures. Draw prior to antibiotics. [[id number]] Collected: 07/15/24 1802    Order Status: Completed Specimen: Blood from Peripheral, Antecubital, Right Updated: 07/16/24 1056     Blood Culture, Routine Gram stain aer bottle: Gram positive cocci      Results called to and read back by:Sagrario Cosby [id number];  07/16/2024      10:55 [id number]    Narrative:      Aerobic and anaerobic

## 2024-07-17 NOTE — PROGRESS NOTES
2000 ml net uf removed   07/17/24 1800   Required for all Hemodialysis Patients   Hepatitis Status negative   Handoff Report   Received From [facility]   Given To Sagrario   Treatment Type   Treatment Type Maintenance   Vital Signs   Temp 100 °F (37.8 °C)   Temp Source Oral   Pulse 69   Resp 18   SpO2 98 %   Device (Oxygen Therapy) room air   BP (!) 162/97   BP Location Right forearm   BP Method Automatic   Patient Position Lying   Assessments (Pre/Post)   Safety vein preservation armband present   Date Hepatitis Profile Obtained 06/24/24   Blood Liters Processed (BLP) 49.1   Transport Modality stretcher   Level of Consciousness (AVPU) alert   Dialyzer Clearance mildly streaked   Pain   Preferred Pain Scale number (Numeric Rating Pain Scale)   Pain Rating (0-10): Rest 0        Hemodialysis AV Fistula Left forearm   No placement date or time found.   Present Prior to Hospital Arrival?: Yes  Size/Length: 17 G  Location: Left forearm   Site Assessment Clean;Dry;Intact   Patency Present;Thrill;Bruit   Status Deaccessed   Flows Good   Dressing Status Clean;Dry;Intact   Site Condition No complications   Dressing Gauze   Post-Hemodialysis Assessment   Rinseback Volume (mL) 250 mL   Blood Volume Processed (Liters) 49.1 L   Dialyzer Clearance Clear   Duration of Treatment 180 minutes   Additional Fluid Intake (mL) 500 mL   Total UF (mL) 2500 mL   Net Fluid Removal 2000   Patient Response to Treatment tolerated well   Post-Treatment Weight 127.3 kg (280 lb 10.3 oz)   Treatment Weight Change -2   Post-Hemodialysis Comments tx completed   Edema   Edema generalized     Educated on avf

## 2024-07-17 NOTE — ASSESSMENT & PLAN NOTE
Patient's anemia is currently stable. Has not received any PRBCs to date. Etiology likely d/t chronic disease due to ESRD  Current CBC reviewed-   Lab Results   Component Value Date    HGB 9.2 (L) 07/17/2024    HCT 28.4 (L) 07/17/2024     Monitor serial CBC and transfuse if patient becomes hemodynamically unstable, symptomatic or H/H drops below 7/21.

## 2024-07-17 NOTE — PROGRESS NOTES
[facility] Medicine  Progress Note    Patient Name: João Ham  MRN: [id number]  Patient Class: IP- Inpatient   Admission Date: 7/15/2024  Length of Stay: 1 days  Attending Physician: Mayito Chase MD  Primary Care Provider: Dawson Granado MD        Subjective:     Principal Problem:Bacteremia        HPI:  44-year-old with past medical history of end-stage renal disease secondary to hypertension and diabetes who comes to the hospital after likely infection of his dialysis catheter.  Catheter was positive for Staphylococcus aureus.  Patient noted fever and chills on the night of admission.  He was noted to be febrile.  He did not appear to be in septic shock.  Patient was admitted for bacteremia, line infection, and ESRD management.    Overview/Hospital Course:  No notes on file    Interval History:  Patient seen and examined.  No acute events overnight.  Discussed with Nephrology.  Going to attempt dialysis today through his fistula.      Objective:     Vital Signs (Most Recent):  Temp: 98.4 °F (36.9 °C) (07/17/24 1145)  Pulse: 71 (07/17/24 1145)  Resp: 17 (07/17/24 1145)  BP: (!) 151/75 (07/17/24 1145)  SpO2: 98 % (07/17/24 1145) Vital Signs (24h Range):  Temp:  [98.4 °F (36.9 °C)-99.4 °F (37.4 °C)] 98.4 °F (36.9 °C)  Pulse:  [71-97] 71  Resp:  [15-17] 17  SpO2:  [95 %-98 %] 98 %  BP: (127-196)/() 151/75     Weight: 129.3 kg (285 lb)  Body mass index is 36.59 kg/m².    Intake/Output Summary (Last 24 hours) at 7/17/2024 1420  Last data filed at 7/17/2024 1330  Gross per 24 hour   Intake 970 ml   Output 30 ml   Net 940 ml         Physical Exam  Vitals and nursing note reviewed.   Eyes:      Pupils: Pupils are equal, round, and reactive to light.   Neck:      Vascular: No JVD.   Cardiovascular:      Rate and Rhythm: Normal rate and regular rhythm.      Heart sounds: Normal heart sounds.   Pulmonary:      Effort: Pulmonary effort is normal.      Breath sounds: Normal breath  sounds.   Abdominal:      General: Bowel sounds are normal. There is no distension.      Palpations: Abdomen is soft.      Tenderness: There is no abdominal tenderness.   Musculoskeletal:         General: No deformity.   Lymphadenopathy:      Cervical: No cervical adenopathy.   Skin:     Coloration: Skin is not pale.      Findings: No rash.             Significant Labs: All pertinent labs within the past 24 hours have been reviewed.    Significant Imaging: I have reviewed all pertinent imaging results/findings within the past 24 hours.  Review of Systems    Assessment/Plan:      * Bacteremia  Patient with bacteremia secondary to indwelling catheter.  Nephrology consulted, will defer to them for further management of the patient's dialysis catheter.  No signs or symptoms sepsis or shock currently.  Continue IV antibiotics and follow-up cultures.    Antibiotics (From admission, onward)      Start     Stop Route Frequency Ordered    07/17/24 2100  ceFAZolin (ANCEF) 1 gram in dextrose 5 % 50 mL IVPB (premix)         -- IV Every Mon, Wed, Fri 07/17/24 1055    07/17/24 0100  mupirocin 2 % ointment         07/21/24 2059 Nasl 2 times daily 07/16/24 2346          Infectious disease consulted-defer to them for further evaluation and management of this disease process.    ESRD (end stage renal disease)  Nephrology consulted. Continue Chronic hemodialysis. Monitor daily electrolytes and defer dialysis orders to nephrology.      Anemia of chronic disease  Patient's anemia is currently stable. Has not received any PRBCs to date. Etiology likely d/t chronic disease due to ESRD  Current CBC reviewed-   Lab Results   Component Value Date    HGB 9.2 (L) 07/17/2024    HCT 28.4 (L) 07/17/2024     Monitor serial CBC and transfuse if patient becomes hemodynamically unstable, symptomatic or H/H drops below 7/21.    Essential hypertension  Chronic, controlled. Latest blood pressure and vitals reviewed-     Temp:  [98.4 °F (36.9 °C)-99.4 °F  (37.4 °C)]   Pulse:  [71-97]   Resp:  [15-17]   BP: (127-196)/()   SpO2:  [95 %-98 %] .   Home meds for hypertension were reviewed and noted below.   Hypertension Medications               cloNIDine (CATAPRES) 0.3 MG tablet TAKE 1 TABLET BY MOUTH THREE TIMES DAILY    hydrALAZINE (APRESOLINE) 100 MG tablet Take 1 tablet (100 mg total) by mouth 3 (three) times daily.    isosorbide mononitrate (IMDUR) 120 MG 24 hr tablet Take 120 mg by mouth once daily.    metoprolol succinate (TOPROL-XL) 50 MG 24 hr tablet Take 150 mg by mouth once daily.    olmesartan (BENICAR) 40 MG tablet Take 1 tablet by mouth once daily.            While in the hospital, will manage blood pressure as follows; Continue home antihypertensive regimen    Will utilize p.r.n. blood pressure medication only if patient's blood pressure greater than 180/110 and he develops symptoms such as worsening chest pain or shortness of breath.    Hypertensive heart and renal disease with congestive heart failure  Patient is identified as having Diastolic (HFpEF) heart failure that is Chronic. CHF is currently controlled. Latest ECHO performed and demonstrates- Results for orders placed during the hospital encounter of 06/25/24    Echo Saline Bubble? No    Interpretation Summary    Left Ventricle: The left ventricle is normal in size. Ventricular mass is normal. Moderately increased wall thickness. Mild septal thickening. There is concentric remodeling. Normal wall motion. There is normal systolic function with a visually estimated ejection fraction of 65 - 70%. Ejection fraction by visual approximation is 68%. There is normal diastolic function.    Right Ventricle: Normal right ventricular cavity size. Wall thickness is normal. Systolic function is normal.    Right Atrium: Right atrium is mildly dilated.    Aortic Valve: The aortic valve is a trileaflet valve.    Mitral Valve: There is trace regurgitation.    Tricuspid Valve: There is trace regurgitation.     Pulmonary Artery: The estimated pulmonary artery systolic pressure is 22 mmHg.    IVC/SVC: Normal venous pressure at 3 mmHg.    Pericardium: There is a trivial posterior effusion just very base.    . Continue Beta Blocker and monitor clinical status closely. Monitor on telemetry. Patient is off CHF pathway.  Monitor strict Is&Os and daily weights.  Place on fluid restriction of 1.5 L. Cardiology has not been consulted.     Severe obesity with body mass index (BMI) of 35.0 to 39.9 with comorbidity  Body mass index is 36.59 kg/m². Morbid obesity complicates all aspects of disease management from diagnostic modalities to treatment. Weight loss encouraged and health benefits explained to patient.           VTE Risk Mitigation (From admission, onward)           Ordered     Reason for No Pharmacological VTE Prophylaxis  Once        Question:  Reasons:  Answer:  Patient is Ambulatory    07/15/24 2214     IP VTE HIGH RISK PATIENT  Once         07/15/24 2214     Place sequential compression device  Until discontinued         07/15/24 2214                    Discharge Planning   MARIA ISABEL: 7/19/2024     Code Status: Full Code   Is the patient medically ready for discharge?:     Reason for patient still in hospital (select all that apply): Treatment  Discharge Plan A: Home                  Mayito Chase MD  Department of Hospital Medicine   [facility]

## 2024-07-17 NOTE — PROGRESS NOTES
João Ham [id number] is a 44 y.o. male who has been consulted for vancomycin dosing.    Pharmacy consult for vancomycin dosing in no longer required.  Vancomycin was discontinued.    Thank you for allowing us to participate in this patient's care.     Yolis Webber, PharmD  [phone]

## 2024-07-17 NOTE — ASSESSMENT & PLAN NOTE
Patient is identified as having Diastolic (HFpEF) heart failure that is Chronic. CHF is currently controlled. Latest ECHO performed and demonstrates- Results for orders placed during the hospital encounter of 06/25/24    Echo Saline Bubble? No    Interpretation Summary    Left Ventricle: The left ventricle is normal in size. Ventricular mass is normal. Moderately increased wall thickness. Mild septal thickening. There is concentric remodeling. Normal wall motion. There is normal systolic function with a visually estimated ejection fraction of 65 - 70%. Ejection fraction by visual approximation is 68%. There is normal diastolic function.    Right Ventricle: Normal right ventricular cavity size. Wall thickness is normal. Systolic function is normal.    Right Atrium: Right atrium is mildly dilated.    Aortic Valve: The aortic valve is a trileaflet valve.    Mitral Valve: There is trace regurgitation.    Tricuspid Valve: There is trace regurgitation.    Pulmonary Artery: The estimated pulmonary artery systolic pressure is 22 mmHg.    IVC/SVC: Normal venous pressure at 3 mmHg.    Pericardium: There is a trivial posterior effusion just very base.    . Continue Beta Blocker and monitor clinical status closely. Monitor on telemetry. Patient is off CHF pathway.  Monitor strict Is&Os and daily weights.  Place on fluid restriction of 1.5 L. Cardiology has not been consulted.

## 2024-07-17 NOTE — ASSESSMENT & PLAN NOTE
Patient with bacteremia secondary to indwelling catheter.  Nephrology consulted, will defer to them for further management of the patient's dialysis catheter.  No signs or symptoms sepsis or shock currently.  Continue IV antibiotics and follow-up cultures.    Antibiotics (From admission, onward)      Start     Stop Route Frequency Ordered    07/17/24 2100  ceFAZolin (ANCEF) 1 gram in dextrose 5 % 50 mL IVPB (premix)         -- IV Every Mon, Wed, Fri 07/17/24 1055    07/17/24 0100  mupirocin 2 % ointment         07/21/24 2059 Nasl 2 times daily 07/16/24 7514          Infectious disease consulted-defer to them for further evaluation and management of this disease process.

## 2024-07-17 NOTE — PLAN OF CARE
Problem: Adult Inpatient Plan of Care  Goal: Patient-Specific Goal (Individualized)  Outcome: Progressing  Goal: Absence of Hospital-Acquired Illness or Injury  Outcome: Progressing     Problem: Infection  Goal: Absence of Infection Signs and Symptoms  Outcome: Progressing     Problem: Wound  Goal: Improved Oral Intake  Outcome: Progressing  Goal: Optimal Pain Control and Function  Outcome: Progressing

## 2024-07-17 NOTE — PROGRESS NOTES
Pharmacist Renal Dose Adjustment Note    João Ham is a 44 y.o. male being treated with the medication cefazolin.    Patient Data:    Vital Signs (Most Recent):  Temp: 98.8 °F (37.1 °C) (07/17/24 0745)  Pulse: 86 (07/17/24 0745)  Resp: 16 (07/17/24 0745)  BP: (!) 166/94 (07/17/24 0929)  SpO2: 98 % (07/17/24 0745) Vital Signs (72h Range):  Temp:  [98.5 °F (36.9 °C)-102.3 °F (39.1 °C)]   Pulse:  []   Resp:  [14-22]   BP: (125-196)/()   SpO2:  [91 %-99 %]      Recent Labs   Lab 07/15/24  1823 07/16/24  0510 07/17/24  0435   CREATININE 10.1* 12.0* 14.1*     Serum creatinine: 14.1 mg/dL (H) 07/17/24 0435  Estimated creatinine clearance: 9.7 mL/min (A)    Cefazolin 500 mg in D5W 100 mL will be changed to Cefazolin 1g IVPB in 50 mL Every Mon, Wed, Fri after dialysis    Pharmacist's Name: Erickson Feldman  Pharmacist's Extension: [phone]

## 2024-07-17 NOTE — SUBJECTIVE & OBJECTIVE
Interval History:  Patient seen and examined.  No acute events overnight.  Discussed with Nephrology.  Going to attempt dialysis today through his fistula.      Objective:     Vital Signs (Most Recent):  Temp: 98.4 °F (36.9 °C) (07/17/24 1145)  Pulse: 71 (07/17/24 1145)  Resp: 17 (07/17/24 1145)  BP: (!) 151/75 (07/17/24 1145)  SpO2: 98 % (07/17/24 1145) Vital Signs (24h Range):  Temp:  [98.4 °F (36.9 °C)-99.4 °F (37.4 °C)] 98.4 °F (36.9 °C)  Pulse:  [71-97] 71  Resp:  [15-17] 17  SpO2:  [95 %-98 %] 98 %  BP: (127-196)/() 151/75     Weight: 129.3 kg (285 lb)  Body mass index is 36.59 kg/m².    Intake/Output Summary (Last 24 hours) at 7/17/2024 1420  Last data filed at 7/17/2024 1330  Gross per 24 hour   Intake 970 ml   Output 30 ml   Net 940 ml         Physical Exam  Vitals and nursing note reviewed.   Eyes:      Pupils: Pupils are equal, round, and reactive to light.   Neck:      Vascular: No JVD.   Cardiovascular:      Rate and Rhythm: Normal rate and regular rhythm.      Heart sounds: Normal heart sounds.   Pulmonary:      Effort: Pulmonary effort is normal.      Breath sounds: Normal breath sounds.   Abdominal:      General: Bowel sounds are normal. There is no distension.      Palpations: Abdomen is soft.      Tenderness: There is no abdominal tenderness.   Musculoskeletal:         General: No deformity.   Lymphadenopathy:      Cervical: No cervical adenopathy.   Skin:     Coloration: Skin is not pale.      Findings: No rash.             Significant Labs: All pertinent labs within the past 24 hours have been reviewed.    Significant Imaging: I have reviewed all pertinent imaging results/findings within the past 24 hours.  Review of Systems

## 2024-07-18 LAB
ALBUMIN SERPL BCP-MCNC: 3.5 G/DL (ref 3.5–5.2)
ALP SERPL-CCNC: 64 U/L (ref 55–135)
ALT SERPL W/O P-5'-P-CCNC: 27 U/L (ref 10–44)
ANION GAP SERPL CALC-SCNC: 13 MMOL/L (ref 8–16)
AST SERPL-CCNC: 19 U/L (ref 10–40)
BACTERIA BLD CULT: ABNORMAL
BASOPHILS # BLD AUTO: 0.03 K/UL (ref 0–0.2)
BASOPHILS NFR BLD: 0.4 % (ref 0–1.9)
BILIRUB SERPL-MCNC: 0.3 MG/DL (ref 0.1–1)
BUN SERPL-MCNC: 30 MG/DL (ref 6–20)
CALCIUM SERPL-MCNC: 8.5 MG/DL (ref 8.7–10.5)
CHLORIDE SERPL-SCNC: 99 MMOL/L (ref 95–110)
CO2 SERPL-SCNC: 24 MMOL/L (ref 23–29)
CREAT SERPL-MCNC: 12.9 MG/DL (ref 0.5–1.4)
DIFFERENTIAL METHOD BLD: ABNORMAL
EOSINOPHIL # BLD AUTO: 0.2 K/UL (ref 0–0.5)
EOSINOPHIL NFR BLD: 2.4 % (ref 0–8)
ERYTHROCYTE [DISTWIDTH] IN BLOOD BY AUTOMATED COUNT: 15.5 % (ref 11.5–14.5)
EST. GFR  (NO RACE VARIABLE): 4.5 ML/MIN/1.73 M^2
GLUCOSE SERPL-MCNC: 94 MG/DL (ref 70–110)
HCT VFR BLD AUTO: 29.3 % (ref 40–54)
HGB BLD-MCNC: 9.4 G/DL (ref 14–18)
IMM GRANULOCYTES # BLD AUTO: 0.11 K/UL (ref 0–0.04)
IMM GRANULOCYTES NFR BLD AUTO: 1.6 % (ref 0–0.5)
LYMPHOCYTES # BLD AUTO: 1.9 K/UL (ref 1–4.8)
LYMPHOCYTES NFR BLD: 26.2 % (ref 18–48)
MAGNESIUM SERPL-MCNC: 2 MG/DL (ref 1.6–2.6)
MCH RBC QN AUTO: 32.5 PG (ref 27–31)
MCHC RBC AUTO-ENTMCNC: 32.1 G/DL (ref 32–36)
MCV RBC AUTO: 101 FL (ref 82–98)
MONOCYTES # BLD AUTO: 1.2 K/UL (ref 0.3–1)
MONOCYTES NFR BLD: 16.4 % (ref 4–15)
NEUTROPHILS # BLD AUTO: 3.8 K/UL (ref 1.8–7.7)
NEUTROPHILS NFR BLD: 53 % (ref 38–73)
NRBC BLD-RTO: 0 /100 WBC
PLATELET # BLD AUTO: 246 K/UL (ref 150–450)
PMV BLD AUTO: 11 FL (ref 9.2–12.9)
POTASSIUM SERPL-SCNC: 4.5 MMOL/L (ref 3.5–5.1)
PROT SERPL-MCNC: 7.5 G/DL (ref 6–8.4)
RBC # BLD AUTO: 2.89 M/UL (ref 4.6–6.2)
SODIUM SERPL-SCNC: 136 MMOL/L (ref 136–145)
WBC # BLD AUTO: 7.09 K/UL (ref 3.9–12.7)

## 2024-07-18 PROCEDURE — 87340 HEPATITIS B SURFACE AG IA: CPT | Performed by: INTERNAL MEDICINE

## 2024-07-18 PROCEDURE — 5A1D70Z PERFORMANCE OF URINARY FILTRATION, INTERMITTENT, LESS THAN 6 HOURS PER DAY: ICD-10-PCS | Performed by: INTERNAL MEDICINE

## 2024-07-18 PROCEDURE — 25000003 PHARM REV CODE 250: Performed by: INTERNAL MEDICINE

## 2024-07-18 PROCEDURE — 36415 COLL VENOUS BLD VENIPUNCTURE: CPT | Performed by: INTERNAL MEDICINE

## 2024-07-18 PROCEDURE — 83735 ASSAY OF MAGNESIUM: CPT | Performed by: INTERNAL MEDICINE

## 2024-07-18 PROCEDURE — 25000003 PHARM REV CODE 250: Performed by: HOSPITALIST

## 2024-07-18 PROCEDURE — 90935 HEMODIALYSIS ONE EVALUATION: CPT

## 2024-07-18 PROCEDURE — 21400001 HC TELEMETRY ROOM

## 2024-07-18 PROCEDURE — 86706 HEP B SURFACE ANTIBODY: CPT | Performed by: INTERNAL MEDICINE

## 2024-07-18 PROCEDURE — 80053 COMPREHEN METABOLIC PANEL: CPT | Performed by: INTERNAL MEDICINE

## 2024-07-18 PROCEDURE — 85025 COMPLETE CBC W/AUTO DIFF WBC: CPT | Performed by: INTERNAL MEDICINE

## 2024-07-18 RX ORDER — SEVELAMER CARBONATE 800 MG/1
800 TABLET, FILM COATED ORAL
Status: DISCONTINUED | OUTPATIENT
Start: 2024-07-18 | End: 2024-07-18

## 2024-07-18 RX ORDER — SEVELAMER CARBONATE 800 MG/1
1600 TABLET, FILM COATED ORAL
Status: DISCONTINUED | OUTPATIENT
Start: 2024-07-19 | End: 2024-07-19 | Stop reason: HOSPADM

## 2024-07-18 RX ORDER — CEFAZOLIN SODIUM 1 G/50ML
1 SOLUTION INTRAVENOUS
Qty: 300 ML | Refills: 0 | Status: SHIPPED | OUTPATIENT
Start: 2024-07-19 | End: 2024-08-03

## 2024-07-18 RX ADMIN — MUPIROCIN 1 G: 20 OINTMENT TOPICAL at 08:07

## 2024-07-18 RX ADMIN — GUAIFENESIN 600 MG: 600 TABLET, EXTENDED RELEASE ORAL at 08:07

## 2024-07-18 RX ADMIN — HYDRALAZINE HYDROCHLORIDE 100 MG: 25 TABLET ORAL at 04:07

## 2024-07-18 RX ADMIN — CLONIDINE HYDROCHLORIDE 0.3 MG: 0.1 TABLET ORAL at 08:07

## 2024-07-18 RX ADMIN — HYDRALAZINE HYDROCHLORIDE 100 MG: 25 TABLET ORAL at 08:07

## 2024-07-18 RX ADMIN — SEVELAMER CARBONATE 1600 MG: 800 TABLET, FILM COATED ORAL at 08:07

## 2024-07-18 RX ADMIN — METOPROLOL SUCCINATE 150 MG: 50 TABLET, FILM COATED, EXTENDED RELEASE ORAL at 08:07

## 2024-07-18 RX ADMIN — CLONIDINE HYDROCHLORIDE 0.3 MG: 0.1 TABLET ORAL at 04:07

## 2024-07-18 RX ADMIN — FAMOTIDINE 20 MG: 20 TABLET ORAL at 08:07

## 2024-07-18 RX ADMIN — SEVELAMER CARBONATE 1600 MG: 800 TABLET, FILM COATED ORAL at 11:07

## 2024-07-18 RX ADMIN — SEVELAMER CARBONATE 1600 MG: 800 TABLET, FILM COATED ORAL at 04:07

## 2024-07-18 NOTE — ASSESSMENT & PLAN NOTE
Patient with bacteremia secondary to indwelling catheter.  Nephrology consulted, will defer to them for further management of the patient's dialysis catheter.  No signs or symptoms sepsis or shock currently.  Continue IV antibiotics and follow-up cultures.    Antibiotics (From admission, onward)    Start     Stop Route Frequency Ordered    07/17/24 2100  ceFAZolin (ANCEF) 1 gram in dextrose 5 % 50 mL IVPB (premix)         -- IV Every Mon, Wed, Fri 07/17/24 1055    07/17/24 0100  mupirocin 2 % ointment         07/21/24 2059 Nasl 2 times daily 07/16/24 1196        Infectious disease consulted-defer to them for further evaluation and management of this disease process.

## 2024-07-18 NOTE — PROGRESS NOTES
[facility] Medicine  Progress Note    Patient Name: João Ham  MRN: [id number]  Patient Class: IP- Inpatient   Admission Date: 7/15/2024  Length of Stay: 2 days  Attending Physician: Mayito Chase MD  Primary Care Provider: Dawson Granado MD        Subjective:     Principal Problem:Bacteremia        HPI:  44-year-old with past medical history of end-stage renal disease secondary to hypertension and diabetes who comes to the hospital after likely infection of his dialysis catheter.  Catheter was positive for Staphylococcus aureus.  Patient noted fever and chills on the night of admission.  He was noted to be febrile.  He did not appear to be in septic shock.  Patient was admitted for bacteremia, line infection, and ESRD management.    Overview/Hospital Course:  Patient was admitted to the hospital for MSSA bacteremia.  He was started on IV antibiotics and improved over the course of his hospitalization with no further evidence of fever or other symptoms.  Likely source of infection was the patient's HemoSplit catheter.  Dialysis was halted through the HemoSplit catheter and was attempted through the patient's forearm fistula.  Dialysis was successful through the fistula, and the HemoSplit catheter was removed on 07/18.  Patient was discharged home with antibiotics to be done at hemodialysis 3 times a week for the next 2 weeks.    Interval History:  Patient seen and examined.  No acute events overnight.  Discussed with Nephrology. Getting hemosplit removed tomorrow.      Objective:     Vital Signs (Most Recent):  Temp: 98.1 °F (36.7 °C) (07/18/24 0745)  Pulse: 66 (07/18/24 0854)  Resp: 14 (07/18/24 0745)  BP: (!) 145/91 (07/18/24 0854)  SpO2: 97 % (07/18/24 0745) Vital Signs (24h Range):  Temp:  [98.1 °F (36.7 °C)-100 °F (37.8 °C)] 98.1 °F (36.7 °C)  Pulse:  [66-82] 66  Resp:  [14-18] 14  SpO2:  [97 %-98 %] 97 %  BP: (117-164)/(66-97) 145/91     Weight: 129.3 kg (285 lb)  Body mass  index is 36.59 kg/m².    Intake/Output Summary (Last 24 hours) at 7/18/2024 1605  Last data filed at 7/18/2024 1304  Gross per 24 hour   Intake 980 ml   Output 2500 ml   Net -1520 ml         Physical Exam  Vitals and nursing note reviewed.   Eyes:      Pupils: Pupils are equal, round, and reactive to light.   Neck:      Vascular: No JVD.   Cardiovascular:      Rate and Rhythm: Normal rate and regular rhythm.      Heart sounds: Normal heart sounds.   Pulmonary:      Effort: Pulmonary effort is normal.      Breath sounds: Normal breath sounds.   Abdominal:      General: Bowel sounds are normal. There is no distension.      Palpations: Abdomen is soft.      Tenderness: There is no abdominal tenderness.   Musculoskeletal:         General: No deformity.   Lymphadenopathy:      Cervical: No cervical adenopathy.   Skin:     Coloration: Skin is not pale.      Findings: No rash.             Significant Labs: All pertinent labs within the past 24 hours have been reviewed.    Significant Imaging: I have reviewed all pertinent imaging results/findings within the past 24 hours.  Review of Systems    Assessment/Plan:      * Bacteremia  Patient with bacteremia secondary to indwelling catheter.  Nephrology consulted, will defer to them for further management of the patient's dialysis catheter.  No signs or symptoms sepsis or shock currently.  Continue IV antibiotics and follow-up cultures.    Antibiotics (From admission, onward)      Start     Stop Route Frequency Ordered    07/17/24 2100  ceFAZolin (ANCEF) 1 gram in dextrose 5 % 50 mL IVPB (premix)         -- IV Every Mon, Wed, Fri 07/17/24 1055    07/17/24 0100  mupirocin 2 % ointment         07/21/24 2059 Nasl 2 times daily 07/16/24 2346          Infectious disease consulted-defer to them for further evaluation and management of this disease process.    ESRD (end stage renal disease)  Nephrology consulted. Continue Chronic hemodialysis. Monitor daily electrolytes and defer  dialysis orders to nephrology.      Anemia of chronic disease  Patient's anemia is currently stable. Has not received any PRBCs to date. Etiology likely d/t chronic disease due to ESRD  Current CBC reviewed-   Lab Results   Component Value Date    HGB 9.4 (L) 07/18/2024    HCT 29.3 (L) 07/18/2024     Monitor serial CBC and transfuse if patient becomes hemodynamically unstable, symptomatic or H/H drops below 7/21.    Essential hypertension  Chronic, controlled. Latest blood pressure and vitals reviewed-     Temp:  [98.1 °F (36.7 °C)-100 °F (37.8 °C)]   Pulse:  [66-82]   Resp:  [14-18]   BP: (117-164)/(66-97)   SpO2:  [97 %-98 %] .   Home meds for hypertension were reviewed and noted below.   Hypertension Medications               cloNIDine (CATAPRES) 0.3 MG tablet TAKE 1 TABLET BY MOUTH THREE TIMES DAILY    hydrALAZINE (APRESOLINE) 100 MG tablet Take 1 tablet (100 mg total) by mouth 3 (three) times daily.    isosorbide mononitrate (IMDUR) 120 MG 24 hr tablet Take 120 mg by mouth once daily.    metoprolol succinate (TOPROL-XL) 50 MG 24 hr tablet Take 150 mg by mouth once daily.    olmesartan (BENICAR) 40 MG tablet Take 1 tablet by mouth once daily.            While in the hospital, will manage blood pressure as follows; Continue home antihypertensive regimen    Will utilize p.r.n. blood pressure medication only if patient's blood pressure greater than 180/110 and he develops symptoms such as worsening chest pain or shortness of breath.    Hypertensive heart and renal disease with congestive heart failure  Patient is identified as having Diastolic (HFpEF) heart failure that is Chronic. CHF is currently controlled. Latest ECHO performed and demonstrates- Results for orders placed during the hospital encounter of 06/25/24    Echo Saline Bubble? No    Interpretation Summary    Left Ventricle: The left ventricle is normal in size. Ventricular mass is normal. Moderately increased wall thickness. Mild septal thickening.  There is concentric remodeling. Normal wall motion. There is normal systolic function with a visually estimated ejection fraction of 65 - 70%. Ejection fraction by visual approximation is 68%. There is normal diastolic function.    Right Ventricle: Normal right ventricular cavity size. Wall thickness is normal. Systolic function is normal.    Right Atrium: Right atrium is mildly dilated.    Aortic Valve: The aortic valve is a trileaflet valve.    Mitral Valve: There is trace regurgitation.    Tricuspid Valve: There is trace regurgitation.    Pulmonary Artery: The estimated pulmonary artery systolic pressure is 22 mmHg.    IVC/SVC: Normal venous pressure at 3 mmHg.    Pericardium: There is a trivial posterior effusion just very base.    . Continue Beta Blocker and monitor clinical status closely. Monitor on telemetry. Patient is off CHF pathway.  Monitor strict Is&Os and daily weights.  Place on fluid restriction of 1.5 L. Cardiology has not been consulted.     Severe obesity with body mass index (BMI) of 35.0 to 39.9 with comorbidity  Body mass index is 36.59 kg/m². Morbid obesity complicates all aspects of disease management from diagnostic modalities to treatment. Weight loss encouraged and health benefits explained to patient.           VTE Risk Mitigation (From admission, onward)           Ordered     Reason for No Pharmacological VTE Prophylaxis  Once        Question:  Reasons:  Answer:  Patient is Ambulatory    07/15/24 2214     IP VTE HIGH RISK PATIENT  Once         07/15/24 2214     Place sequential compression device  Until discontinued         07/15/24 2214                    Discharge Planning   MARIA ISABEL: 7/18/2024     Code Status: Full Code   Is the patient medically ready for discharge?:     Reason for patient still in hospital (select all that apply): Treatment  Discharge Plan A: Home   Discharge Delays: (!) Change in Medical Condition              Mayito Chase MD  Department of Hospital Medicine   [place]  [facility]

## 2024-07-18 NOTE — PROGRESS NOTES
INPATIENT NEPHROLOGY Progress  [facility]    Patient Name: João Ham  Date: 07/18/2024    Reason for consultation: ESRD    Chief Complaint:   Chief Complaint   Patient presents with    Fever     ON FRIDAY AT DIALYSIS,     ABNORMAL LABS     POS BLOOD CULTURES DRAWN FROM DIALYSIS CATH FRIDAY       History of Present Illness:  44-year-old with past medical history of end-stage renal disease secondary to hypertension and diabetes on HD MWF who comes to the hospital after likely infection of his dialysis catheter. Patient noted fever and chills since Friday- got IV vanc at dialysis. Catheter was positive for Staphylococcus aureus. Patient was admitted for bacteremia, line infection, and ESRD management. Consulted for dialysis.    Interval History:  7/16- blood cx from admit positive- need to get TDC removed- will use AVF tomorrow and if works, will get line out- can use ancef- ordered another set of blood cx today- ordered echo  7/17  pressures a little high today, will UF as tolerated.  Lab results reviewed.  Hopefully, will be able to use AVF today.. No complaints.    7/18  s/p HD yesterday via avf with 2 liter net UF.  AFVSS    Plan of Care:    Assessment:  Sepsis due to Staph Aureus bacteremia from infected dialysis catheter  ESRD on HD MWF  HTN  SHPT   Anemia CKD    Plan:    - AVF used 7/17 - if works,  Hemosplit needs removal     - can switch IV vanc to ancef  - daily blood cx until neg; echo with no Vegetations   - HD MWF  - renal diet, 1.5L fluid restriction  - hold home BP meds for now- slowly resume if BP > 150/90  - continue binders, D3 and calcitriol  - ordered LOR with HD    Thank you for allowing us to participate in this patient's care. We will continue to follow.    Vital Signs:  Temp Readings from Last 3 Encounters:   07/18/24 98.1 °F (36.7 °C)   02/27/24 98.3 °F (36.8 °C) (Oral)   02/21/24 98.3 °F (36.8 °C) (Oral)       Pulse Readings from Last 3 Encounters:   07/18/24 66    06/25/24 90   04/30/24 63       BP Readings from Last 3 Encounters:   07/18/24 (!) 145/91   06/25/24 138/86   04/30/24 (!) 147/89       Weight:  Wt Readings from Last 3 Encounters:   07/17/24 129.3 kg (285 lb)   07/16/24 128.3 kg (282 lb 13.6 oz)   06/25/24 129.3 kg (285 lb 0.9 oz)       Past Medical & Surgical History:  Past Medical History:   Diagnosis Date    Allergy     Anemia     Anxiety     CHF (congestive heart failure)     Depression     High blood pressure with chronic kidney disease, stage 5 chronic kidney disease or end stage renal disease     Hypertension        Past Surgical History:   Procedure Laterality Date    FISTULOGRAM Bilateral 4/30/2024    Procedure: Fistulogram;  Surgeon: Khoobehi, Ali, MD;  Location: [facility] CATH/EP LAB;  Service: Vascular;  Laterality: Bilateral;    HERNIA REPAIR Right     With mesh    INSERTION, CATHETER, TUNNELED N/A 6/22/2023    Procedure: Insertion,catheter,tunneled;  Surgeon: Everett Caicedo MD;  Location: [facility] OR;  Service: General;  Laterality: N/A;    PERCUTANEOUS TRANSLUMINAL ANGIOPLASTY OF ARTERIOVENOUS FISTULA Left 4/30/2024    Procedure: PTA, AV FISTULA;  Surgeon: Khoobehi, Ali, MD;  Location: [facility] CATH/EP LAB;  Service: Vascular;  Laterality: Left;       Past Social History:  Social History     Socioeconomic History    Marital status: Single   Tobacco Use    Smoking status: Never     Passive exposure: Never    Smokeless tobacco: Never   Substance and Sexual Activity    Alcohol use: Never    Drug use: Never    Sexual activity: Not Currently   Social History Narrative    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     Social Determinants of Health     Financial Resource Strain: Low Risk  (7/18/2024)    Overall Financial Resource Strain (CARDIA)     Difficulty of Paying Living Expenses: Not very hard   Food Insecurity: No Food Insecurity (7/18/2024)    Hunger Vital Sign     Worried About Running Out of Food in the Last Year: Never true     Ran Out of Food in the Last Year: Never true    Transportation Needs: No Transportation Needs (7/18/2024)    TRANSPORTATION NEEDS     Transportation : No   Physical Activity: Sufficiently Active (1/3/2023)    Exercise Vital Sign     Days of Exercise per Week: 6 days     Minutes of Exercise per Session: 60 min   Recent Concern: Physical Activity - Insufficiently Active (10/11/2022)    Exercise Vital Sign     Days of Exercise per Week: 3 days     Minutes of Exercise per Session: 30 min   Stress: Stress Concern Present (7/18/2024)    [facility] [employer] - Occupational Stress Questionnaire     Feeling of Stress : Very much   Housing Stability: Low Risk  (7/18/2024)    Housing Stability Vital Sign     Unable to Pay for Housing in the Last Year: No     Homeless in the Last Year: No       Medications:  Scheduled Meds:   ceFAZolin (Ancef) IV (PEDS and ADULTS)  1 g Intravenous Every Mon, Wed, Fri    cloNIDine  0.3 mg Oral TID    epoetin mily-ebpx (RETACRIT) injection  50 Units/kg Intravenous Every Mon, Wed, Fri    famotidine  20 mg Oral Daily    fluticasone propionate  2 spray Each Nostril Daily    guaiFENesin  600 mg Oral BID    hydrALAZINE  100 mg Oral TID    LIDOcaine-prilocaine   Topical (Top) Every Mon, Wed, Fri    metoprolol succinate  150 mg Oral Daily    mupirocin   Nasal BID    sevelamer carbonate  1,600 mg Oral TID WM     Continuous Infusions:  PRN Meds:.  Current Facility-Administered Medications:     acetaminophen, 650 mg, Oral, Q4H PRN    aluminum-magnesium hydroxide-simethicone, 30 mL, Oral, QID PRN    dextrose 50%, 12.5 g, Intravenous, PRN    dextrose 50%, 25 g, Intravenous, PRN    glucagon (human recombinant), 1 mg, Intramuscular, PRN    glucose, 16 g, Oral, PRN    glucose, 24 g, Oral, PRN    magnesium oxide, 800 mg, Oral, PRN    magnesium oxide, 800 mg, Oral, PRN    melatonin, 6 mg, Oral, Nightly PRN    naloxone, 0.02 mg, Intravenous, PRN    ondansetron, 4 mg, Intravenous, Q6H PRN    potassium bicarbonate, 35 mEq, Oral, PRN     potassium bicarbonate, 50 mEq, Oral, PRN    potassium bicarbonate, 60 mEq, Oral, PRN    potassium, sodium phosphates, 2 packet, Oral, PRN    potassium, sodium phosphates, 2 packet, Oral, PRN    potassium, sodium phosphates, 2 packet, Oral, PRN    sodium chloride 0.9%, 10 mL, Intravenous, Q12H PRN  No current facility-administered medications on file prior to encounter.     Current Outpatient Medications on File Prior to Encounter   Medication Sig Dispense Refill    calcitRIOL (ROCALTROL) 0.25 MCG Cap Take 1 capsule by mouth once daily (Patient taking differently: Take 0.25 mcg by mouth once daily.) 90 capsule 1    calcium carbonate (TUMS) 200 mg calcium (500 mg) chewable tablet Take 2 tablets (1,000 mg total) by mouth 3 (three) times daily with meals. (Patient taking differently: Take 1,000 mg by mouth 3 (three) times daily.) 180 tablet 11    cholecalciferol, vitamin D3, 125 mcg (5,000 unit) Tab Take 1 tablet by mouth once daily.      cloNIDine (CATAPRES) 0.3 MG tablet TAKE 1 TABLET BY MOUTH THREE TIMES DAILY 270 tablet 2    ELIQUIS 2.5 mg Tab Take 2.5 mg by mouth 2 (two) times daily.      hydrALAZINE (APRESOLINE) 100 MG tablet Take 1 tablet (100 mg total) by mouth 3 (three) times daily. 90 tablet 11    isosorbide mononitrate (IMDUR) 120 MG 24 hr tablet Take 120 mg by mouth once daily.      metoprolol succinate (TOPROL-XL) 50 MG 24 hr tablet Take 150 mg by mouth once daily.      olmesartan (BENICAR) 40 MG tablet Take 1 tablet by mouth once daily.      sevelamer carbonate (RENVELA) 800 mg Tab Take 2 tablets (1,600 mg total) by mouth 3 (three) times daily with meals. 180 tablet 11       Allergies:  Mushroom    Past Family History:  Reviewed; refer to Hospitalist Admission Note    Review of Systems:  Review of Systems - All 14 systems reviewed and negative, except as noted in HPI    Physical Exam:  General Appearance:    NAD, AAO x 3, cooperative, appears stated age   Head:    Normocephalic, atraumatic   Eyes:    PER,  EOMI, and conjunctiva/sclera clear bilaterally       Mouth:   Moist mucus membranes, no thrush or oral lesions,       normal dentition   Back:     No CVA tenderness   Lungs:     Clear to auscultation bilaterally, no wheezes, crackles,           rales or rhonchi, symmetric air movement, respirations unlabored   Chest wall:    No tenderness or deformity   Heart:    Regular rate and rhythm, S1 and S2 normal, no murmur, rub   or gallop   Abdomen:     Soft, non-tender, non-distended, bowel sounds active all four   quadrants, no RT or guarding, no masses, no organomegaly   Extremities:   Warm and well perfused, distal pulses are intact, no             cyanosis or peripheral edema   MSK:   No joint or muscle swelling, tenderness or deformity   Skin:   Skin color, texture, turgor normal, no rashes or lesions   Neurologic/Psychiatric:   CNII-XII intact, normal strength and sensation       throughout, no asterixis; normal affect, memory, judgement     and insight      Results:  Lab Results   Component Value Date     07/18/2024    K 4.5 07/18/2024    CL 99 07/18/2024    CO2 24 07/18/2024    BUN 30 (H) 07/18/2024    CREATININE 12.9 (H) 07/18/2024    CALCIUM 8.5 (L) 07/18/2024    ANIONGAP 13 07/18/2024       Lab Results   Component Value Date    CALCIUM 8.5 (L) 07/18/2024    PHOS 5.4 (H) 06/28/2023       Recent Labs   Lab 07/18/24  0521   WBC 7.09   RBC 2.89*   HGB 9.4*   HCT 29.3*      *   MCH 32.5*   MCHC 32.1       I have personally reviewed pertinent radiological imaging and reports from this admission.    I have spent > 70 minutes providing care for this patient for the above diagnoses. These services have included chart/data/imaging review, evaluation, exam, formulation of plan, , note preparation, and discussions with staff involved in this patient's care.    Mariano Hickey MD    [facility] [address] [address]  [phone] (p)  [phone] (f)

## 2024-07-18 NOTE — SUBJECTIVE & OBJECTIVE
Interval History:  Patient seen and examined.  No acute events overnight.  Discussed with Nephrology. Getting hemosplit removed tomorrow.      Objective:     Vital Signs (Most Recent):  Temp: 98.1 °F (36.7 °C) (07/18/24 0745)  Pulse: 66 (07/18/24 0854)  Resp: 14 (07/18/24 0745)  BP: (!) 145/91 (07/18/24 0854)  SpO2: 97 % (07/18/24 0745) Vital Signs (24h Range):  Temp:  [98.1 °F (36.7 °C)-100 °F (37.8 °C)] 98.1 °F (36.7 °C)  Pulse:  [66-82] 66  Resp:  [14-18] 14  SpO2:  [97 %-98 %] 97 %  BP: (117-164)/(66-97) 145/91     Weight: 129.3 kg (285 lb)  Body mass index is 36.59 kg/m².    Intake/Output Summary (Last 24 hours) at 7/18/2024 1605  Last data filed at 7/18/2024 1304  Gross per 24 hour   Intake 980 ml   Output 2500 ml   Net -1520 ml         Physical Exam  Vitals and nursing note reviewed.   Eyes:      Pupils: Pupils are equal, round, and reactive to light.   Neck:      Vascular: No JVD.   Cardiovascular:      Rate and Rhythm: Normal rate and regular rhythm.      Heart sounds: Normal heart sounds.   Pulmonary:      Effort: Pulmonary effort is normal.      Breath sounds: Normal breath sounds.   Abdominal:      General: Bowel sounds are normal. There is no distension.      Palpations: Abdomen is soft.      Tenderness: There is no abdominal tenderness.   Musculoskeletal:         General: No deformity.   Lymphadenopathy:      Cervical: No cervical adenopathy.   Skin:     Coloration: Skin is not pale.      Findings: No rash.             Significant Labs: All pertinent labs within the past 24 hours have been reviewed.    Significant Imaging: I have reviewed all pertinent imaging results/findings within the past 24 hours.  Review of Systems

## 2024-07-18 NOTE — PLAN OF CARE
Problem: Adult Inpatient Plan of Care  Goal: Plan of Care Review  Outcome: Not Progressing  Goal: Patient-Specific Goal (Individualized)  Outcome: Not Progressing  Goal: Absence of Hospital-Acquired Illness or Injury  Outcome: Not Progressing  Goal: Optimal Comfort and Wellbeing  Outcome: Not Progressing  Goal: Readiness for Transition of Care  Outcome: Not Progressing     Problem: Wound  Goal: Improved Oral Intake  Outcome: Not Progressing     Problem: Hemodialysis  Goal: Absence of Infection Signs and Symptoms  Outcome: Not Progressing

## 2024-07-18 NOTE — PLAN OF CARE
"Per ID, "I will wait for clearance of bacteremia prior to DC " - Case Management anticipating need for IV abx with OP HD - CM closely following. Patient denied additional needs at this time     07/18/24 1431   Discharge Reassessment   Assessment Type Discharge Planning Reassessment   Did the patient's condition or plan change since previous assessment? Yes   Discharge Plan discussed with: Patient   Communicated MARIA ISABEL with patient/caregiver Yes   Discharge Plan A Home   Why the patient remains in the hospital Requires continued medical care   Post-Acute Status   Discharge Delays (!) Change in Medical Condition       "

## 2024-07-18 NOTE — ASSESSMENT & PLAN NOTE
Patient's anemia is currently stable. Has not received any PRBCs to date. Etiology likely d/t chronic disease due to ESRD  Current CBC reviewed-   Lab Results   Component Value Date    HGB 9.4 (L) 07/18/2024    HCT 29.3 (L) 07/18/2024     Monitor serial CBC and transfuse if patient becomes hemodynamically unstable, symptomatic or H/H drops below 7/21.

## 2024-07-18 NOTE — ASSESSMENT & PLAN NOTE
Chronic, controlled. Latest blood pressure and vitals reviewed-     Temp:  [98.1 °F (36.7 °C)-100 °F (37.8 °C)]   Pulse:  [66-82]   Resp:  [14-18]   BP: (117-164)/(66-97)   SpO2:  [97 %-98 %] .   Home meds for hypertension were reviewed and noted below.   Hypertension Medications               cloNIDine (CATAPRES) 0.3 MG tablet TAKE 1 TABLET BY MOUTH THREE TIMES DAILY    hydrALAZINE (APRESOLINE) 100 MG tablet Take 1 tablet (100 mg total) by mouth 3 (three) times daily.    isosorbide mononitrate (IMDUR) 120 MG 24 hr tablet Take 120 mg by mouth once daily.    metoprolol succinate (TOPROL-XL) 50 MG 24 hr tablet Take 150 mg by mouth once daily.    olmesartan (BENICAR) 40 MG tablet Take 1 tablet by mouth once daily.            While in the hospital, will manage blood pressure as follows; Continue home antihypertensive regimen    Will utilize p.r.n. blood pressure medication only if patient's blood pressure greater than 180/110 and he develops symptoms such as worsening chest pain or shortness of breath.

## 2024-07-19 VITALS
BODY MASS INDEX: 36.57 KG/M2 | SYSTOLIC BLOOD PRESSURE: 156 MMHG | WEIGHT: 285 LBS | TEMPERATURE: 98 F | DIASTOLIC BLOOD PRESSURE: 99 MMHG | RESPIRATION RATE: 17 BRPM | HEART RATE: 68 BPM | OXYGEN SATURATION: 98 % | HEIGHT: 74 IN

## 2024-07-19 LAB
ALBUMIN SERPL BCP-MCNC: 3.4 G/DL (ref 3.5–5.2)
ALP SERPL-CCNC: 58 U/L (ref 55–135)
ALT SERPL W/O P-5'-P-CCNC: 24 U/L (ref 10–44)
ANION GAP SERPL CALC-SCNC: 14 MMOL/L (ref 8–16)
AST SERPL-CCNC: 21 U/L (ref 10–40)
BASOPHILS # BLD AUTO: 0.03 K/UL (ref 0–0.2)
BASOPHILS NFR BLD: 0.4 % (ref 0–1.9)
BILIRUB SERPL-MCNC: 0.3 MG/DL (ref 0.1–1)
BUN SERPL-MCNC: 39 MG/DL (ref 6–20)
CALCIUM SERPL-MCNC: 8.6 MG/DL (ref 8.7–10.5)
CHLORIDE SERPL-SCNC: 99 MMOL/L (ref 95–110)
CO2 SERPL-SCNC: 24 MMOL/L (ref 23–29)
CREAT SERPL-MCNC: 15.1 MG/DL (ref 0.5–1.4)
DIFFERENTIAL METHOD BLD: ABNORMAL
EOSINOPHIL # BLD AUTO: 0.2 K/UL (ref 0–0.5)
EOSINOPHIL NFR BLD: 3.3 % (ref 0–8)
ERYTHROCYTE [DISTWIDTH] IN BLOOD BY AUTOMATED COUNT: 15.6 % (ref 11.5–14.5)
EST. GFR  (NO RACE VARIABLE): 3.7 ML/MIN/1.73 M^2
GLUCOSE SERPL-MCNC: 90 MG/DL (ref 70–110)
HBV SURFACE AB SER QL: NON REACTIVE
HBV SURFACE AG SERPL QL IA: NEGATIVE
HCT VFR BLD AUTO: 28.2 % (ref 40–54)
HGB BLD-MCNC: 9.2 G/DL (ref 14–18)
IMM GRANULOCYTES # BLD AUTO: 0.13 K/UL (ref 0–0.04)
IMM GRANULOCYTES NFR BLD AUTO: 1.9 % (ref 0–0.5)
LYMPHOCYTES # BLD AUTO: 1.7 K/UL (ref 1–4.8)
LYMPHOCYTES NFR BLD: 24.5 % (ref 18–48)
MAGNESIUM SERPL-MCNC: 2.1 MG/DL (ref 1.6–2.6)
MCH RBC QN AUTO: 32.5 PG (ref 27–31)
MCHC RBC AUTO-ENTMCNC: 32.6 G/DL (ref 32–36)
MCV RBC AUTO: 100 FL (ref 82–98)
MONOCYTES # BLD AUTO: 1 K/UL (ref 0.3–1)
MONOCYTES NFR BLD: 14.1 % (ref 4–15)
NEUTROPHILS # BLD AUTO: 3.8 K/UL (ref 1.8–7.7)
NEUTROPHILS NFR BLD: 55.8 % (ref 38–73)
NRBC BLD-RTO: 0 /100 WBC
PLATELET # BLD AUTO: 311 K/UL (ref 150–450)
PMV BLD AUTO: 10.3 FL (ref 9.2–12.9)
POTASSIUM SERPL-SCNC: 4.6 MMOL/L (ref 3.5–5.1)
PROT SERPL-MCNC: 7.3 G/DL (ref 6–8.4)
RBC # BLD AUTO: 2.83 M/UL (ref 4.6–6.2)
SODIUM SERPL-SCNC: 137 MMOL/L (ref 136–145)
WBC # BLD AUTO: 6.87 K/UL (ref 3.9–12.7)

## 2024-07-19 PROCEDURE — 25000003 PHARM REV CODE 250: Performed by: SURGERY

## 2024-07-19 PROCEDURE — 63600175 PHARM REV CODE 636 W HCPCS: Performed by: SURGERY

## 2024-07-19 PROCEDURE — 87077 CULTURE AEROBIC IDENTIFY: CPT | Performed by: SURGERY

## 2024-07-19 PROCEDURE — 87186 SC STD MICRODIL/AGAR DIL: CPT | Performed by: SURGERY

## 2024-07-19 PROCEDURE — C1769 GUIDE WIRE: HCPCS | Performed by: SURGERY

## 2024-07-19 PROCEDURE — 87070 CULTURE OTHR SPECIMN AEROBIC: CPT | Mod: 59 | Performed by: SURGERY

## 2024-07-19 PROCEDURE — 36415 COLL VENOUS BLD VENIPUNCTURE: CPT | Performed by: INTERNAL MEDICINE

## 2024-07-19 PROCEDURE — 83735 ASSAY OF MAGNESIUM: CPT | Performed by: INTERNAL MEDICINE

## 2024-07-19 PROCEDURE — 80053 COMPREHEN METABOLIC PANEL: CPT | Performed by: INTERNAL MEDICINE

## 2024-07-19 PROCEDURE — 87147 CULTURE TYPE IMMUNOLOGIC: CPT | Performed by: SURGERY

## 2024-07-19 PROCEDURE — 99152 MOD SED SAME PHYS/QHP 5/>YRS: CPT | Performed by: SURGERY

## 2024-07-19 PROCEDURE — 85025 COMPLETE CBC W/AUTO DIFF WBC: CPT | Performed by: INTERNAL MEDICINE

## 2024-07-19 PROCEDURE — 36589 REMOVAL TUNNELED CV CATH: CPT | Mod: RT | Performed by: SURGERY

## 2024-07-19 PROCEDURE — 90935 HEMODIALYSIS ONE EVALUATION: CPT

## 2024-07-19 PROCEDURE — 63600175 PHARM REV CODE 636 W HCPCS: Mod: JZ,JG | Performed by: SURGERY

## 2024-07-19 PROCEDURE — 0JPTXXZ REMOVAL OF TUNNELED VASCULAR ACCESS DEVICE FROM TRUNK SUBCUTANEOUS TISSUE AND FASCIA, EXTERNAL APPROACH: ICD-10-PCS | Performed by: SURGERY

## 2024-07-19 RX ORDER — CEFAZOLIN SODIUM 1 G/3ML
INJECTION, POWDER, FOR SOLUTION INTRAMUSCULAR; INTRAVENOUS
Status: DISCONTINUED | OUTPATIENT
Start: 2024-07-19 | End: 2024-07-19 | Stop reason: HOSPADM

## 2024-07-19 RX ORDER — FENTANYL CITRATE 50 UG/ML
INJECTION, SOLUTION INTRAMUSCULAR; INTRAVENOUS
Status: DISCONTINUED | OUTPATIENT
Start: 2024-07-19 | End: 2024-07-19 | Stop reason: HOSPADM

## 2024-07-19 RX ORDER — MIDAZOLAM HYDROCHLORIDE 2 MG/2ML
INJECTION, SOLUTION INTRAMUSCULAR; INTRAVENOUS
Status: DISCONTINUED | OUTPATIENT
Start: 2024-07-19 | End: 2024-07-19 | Stop reason: HOSPADM

## 2024-07-19 RX ADMIN — EPOETIN ALFA-EPBX 6400 UNITS: 10000 INJECTION, SOLUTION INTRAVENOUS; SUBCUTANEOUS at 06:07

## 2024-07-19 RX ADMIN — SEVELAMER CARBONATE 1600 MG: 800 TABLET, FILM COATED ORAL at 12:07

## 2024-07-19 RX ADMIN — SEVELAMER CARBONATE 1600 MG: 800 TABLET, FILM COATED ORAL at 07:07

## 2024-07-19 RX ADMIN — LIDOCAINE AND PRILOCAINE: 25; 25 CREAM TOPICAL at 11:07

## 2024-07-19 NOTE — NURSING TRANSFER
Nursing Transfer Note      7/19/2024   12:07 PM    Nurse giving handoff:Pratibha  Nurse receiving handoff:Norma    Reason patient is being transferred: return to room    Transfer To: [id number]    Transfer via stretcher    Transfer with cardiac monitoring    Transported by Pratibha    Transfer Vital Signs:  Blood Pressure:  Heart Rate:  O2:  Temperature:  Respirations:    Telemetry: Box Number [id number]  Order for Tele Monitor?     Additional Lines:     4eyes on Skin: yes    Medicines sent: none    Any special needs or follow-up needed: assess right chest wall for any swelling or bleeding    Patient belongings transferred with patient: Yes    Chart send with patient: Yes    Notified:     Patient reassessed at: 07/19/2024 1045  1  Upon arrival to floor: cardiac monitor applied, patient oriented to room, call bell in reach, and bed in lowest position

## 2024-07-19 NOTE — OP NOTE
[facility]  Vascular Surgery  Operative Note    SUMMARY     Date of Procedure: 7/19/2024     Procedure:   Right IJ tunneled cath removal    Surgeons and Role:     * Khoobehi, Ali, MD - Primary    Assisting Surgeon: None    Pre-Operative Diagnosis: Sepsis [A41.9]  ESRD (end stage renal disease) [N18.6]    Post-Operative Diagnosis: Post-Op Diagnosis Codes:     * Sepsis [A41.9]     * ESRD (end stage renal disease) [N18.6]    Anesthesia: RN IV Sedation    Operative Findings (including complications, if any): n/a    Description of Technical Procedures:   I discussed with the patient indications, risks, and benefits of PermCath removal.  Risks discussed included bleeding, infection, DVT, myocardial infarction, cardiac arrhythmia, death.  Patient was agreeable and gave consent.    Under my direct supervision, moderate sedation was administered during the course of the procedure with Versed (2 mg) and Fentanyl (250 mcgs). An independent observer was present throughout the procedure, there was continuous monitoring of cardiac telemetry, hemodynamics and pulse oximetry. I was personally present and spent 30 minutes face to face time during sedation administration.    Olga-catheter soft tissue was infiltrated with 10 cc of 1% lidocaine. Catheter cuff was bluntly dissected out with a curved hemostat. Catheter was removed and tip sent for culture.    Dressing was applied after achieving adequate hemostasis. Patient tolerated the procedure well.    Significant Surgical Tasks Conducted by the Assistant(s), if Applicable: n/a    Estimated Blood Loss (EBL): * No values recorded between 7/19/2024  9:18 AM and 7/19/2024  9:32 AM *           Implants: * No implants in log *    Specimens:   Specimen (24h ago, onward)      None                    Condition: Good    Disposition: PACU - hemodynamically stable.    Attestation: I performed the procedure.

## 2024-07-19 NOTE — NURSING
Patient to cath lab at 0900 via stretcher.     Patient back from heart center via stretcher at 1110. Right chest wall dressing clean dry and intact.

## 2024-07-19 NOTE — CONSULTS
[facility]  Vascular Surgery  Consult Note      Subjective:     Chief Complaint/Reason for Admission: bacteremia    History of Present Illness: Pt with ESRD, left arm radiocephalic avf placed by myself, admitted with MRSA bacteremia. Pt needs tunneled cath removed. He is extrwemely pain sensitive and requests that it be removed under sedation.    Medications Prior to Admission   Medication Sig Dispense Refill Last Dose    calcitRIOL (ROCALTROL) 0.25 MCG Cap Take 1 capsule by mouth once daily (Patient taking differently: Take 0.25 mcg by mouth once daily.) 90 capsule 1 Past Week    calcium carbonate (TUMS) 200 mg calcium (500 mg) chewable tablet Take 2 tablets (1,000 mg total) by mouth 3 (three) times daily with meals. (Patient taking differently: Take 1,000 mg by mouth 3 (three) times daily.) 180 tablet 11 Past Week    cholecalciferol, vitamin D3, 125 mcg (5,000 unit) Tab Take 1 tablet by mouth once daily.   Past Week    cloNIDine (CATAPRES) 0.3 MG tablet TAKE 1 TABLET BY MOUTH THREE TIMES DAILY 270 tablet 2 7/15/2024    ELIQUIS 2.5 mg Tab Take 2.5 mg by mouth 2 (two) times daily.   7/15/2024    hydrALAZINE (APRESOLINE) 100 MG tablet Take 1 tablet (100 mg total) by mouth 3 (three) times daily. 90 tablet 11 Past Week    isosorbide mononitrate (IMDUR) 120 MG 24 hr tablet Take 120 mg by mouth once daily.   Past Week    metoprolol succinate (TOPROL-XL) 50 MG 24 hr tablet Take 150 mg by mouth once daily.   Past Week    olmesartan (BENICAR) 40 MG tablet Take 1 tablet by mouth once daily.   Past Week    sevelamer carbonate (RENVELA) 800 mg Tab Take 2 tablets (1,600 mg total) by mouth 3 (three) times daily with meals. 180 tablet 11 7/15/2024       Review of patient's allergies indicates:   Allergen Reactions    Mushroom Swelling     Tongue swells        Past Medical History:   Diagnosis Date    Allergy     Anemia     Anxiety     CHF (congestive heart failure)     Depression     High blood pressure with  chronic kidney disease, stage 5 chronic kidney disease or end stage renal disease     Hypertension      Past Surgical History:   Procedure Laterality Date    FISTULOGRAM Bilateral 4/30/2024    Procedure: Fistulogram;  Surgeon: Khoobehi, Ali, MD;  Location: [facility] CATH/EP LAB;  Service: Vascular;  Laterality: Bilateral;    HERNIA REPAIR Right     With mesh    INSERTION, CATHETER, TUNNELED N/A 6/22/2023    Procedure: Insertion,catheter,tunneled;  Surgeon: Everett Caicedo MD;  Location: [facility] OR;  Service: General;  Laterality: N/A;    PERCUTANEOUS TRANSLUMINAL ANGIOPLASTY OF ARTERIOVENOUS FISTULA Left 4/30/2024    Procedure: PTA, AV FISTULA;  Surgeon: Khoobehi, Ali, MD;  Location: [facility] CATH/EP LAB;  Service: Vascular;  Laterality: Left;     Family History    None       Tobacco Use    Smoking status: Never     Passive exposure: Never    Smokeless tobacco: Never   Substance and Sexual Activity    Alcohol use: Never    Drug use: Never    Sexual activity: Not Currently     Review of Systems  Objective:     Vital Signs (Most Recent):  Temp: 98 °F (36.7 °C) (07/19/24 0720)  Pulse: 64 (07/19/24 0720)  Resp: 16 (07/19/24 0720)  BP: (!) 145/90 (07/19/24 0720)  SpO2: 96 % (07/19/24 0720) Vital Signs (24h Range):  Temp:  [97.8 °F (36.6 °C)-98.6 °F (37 °C)] 98 °F (36.7 °C)  Pulse:  [64-77] 64  Resp:  [16-18] 16  SpO2:  [96 %-98 %] 96 %  BP: (118-145)/(78-90) 145/90     Weight: 129.3 kg (285 lb)  Body mass index is 36.59 kg/m².        Physical Exam  Vitals and nursing note reviewed.   Constitutional:       Appearance: Normal appearance. He is obese.   HENT:      Head: Normocephalic.   Neck:      Vascular: No carotid bruit.   Cardiovascular:      Rate and Rhythm: Normal rate and regular rhythm.      Pulses:           Radial pulses are 2+ on the right side and 2+ on the left side.      Heart sounds: Normal heart sounds.      Comments: Left forearm avf +thrill/bruit  Pulmonary:      Effort: Pulmonary effort is normal.      Breath  sounds: Normal breath sounds.   Abdominal:      Palpations: Abdomen is soft.      Tenderness: There is no abdominal tenderness.   Skin:     Capillary Refill: Capillary refill takes less than 2 seconds.   Neurological:      Mental Status: He is alert.         Significant Labs:  BMP:   Recent Labs   Lab 07/19/24  0551   GLU 90      K 4.6   CL 99   CO2 24   BUN 39*   CREATININE 15.1*   CALCIUM 8.6*   MG 2.1     CBC:   Recent Labs   Lab 07/19/24  0551   WBC 6.87   RBC 2.83*   HGB 9.2*   HCT 28.2*      *   MCH 32.5*   MCHC 32.6       Significant Diagnostics:  I have reviewed all pertinent imaging results/findings within the past 24 hours.    Assessment/Plan:   Pt in need of tunneled cath removal. His avf is working well. He wishes to have it removed under sedation. We will plan for removal in cath lab tomorrow.    Pt no longer requires Eliquis in my opinion, which I had started due to avf issues which he no longer has.    Active Diagnoses:    Diagnosis Date Noted POA    PRINCIPAL PROBLEM:  Bacteremia [R78.81] 07/15/2024 Yes    Essential hypertension [I10] 07/17/2024 Yes    Hypertensive heart and renal disease with congestive heart failure [I13.0] 11/29/2022 Yes    Severe obesity with body mass index (BMI) of 35.0 to 39.9 with comorbidity [E66.01] 10/17/2022 Yes    ESRD (end stage renal disease) [N18.6] 10/10/2022 Yes    Anemia of chronic disease [D63.8] 10/10/2022 Yes      Problems Resolved During this Admission:       Thank you for your consult. I will follow-up with patient. Please contact us if you have any additional questions.    Ali Khoobehi, MD  Vascular Surgery  [facility]

## 2024-07-19 NOTE — PLAN OF CARE
Problem: Adult Inpatient Plan of Care  Goal: Plan of Care Review  Outcome: Progressing  Goal: Patient-Specific Goal (Individualized)  Outcome: Progressing  Goal: Absence of Hospital-Acquired Illness or Injury  Outcome: Progressing  Goal: Optimal Comfort and Wellbeing  Outcome: Progressing  Goal: Readiness for Transition of Care  Outcome: Progressing     Problem: Infection  Goal: Absence of Infection Signs and Symptoms  Outcome: Progressing     Problem: Wound  Goal: Optimal Coping  Outcome: Progressing  Goal: Optimal Functional Ability  Outcome: Progressing  Goal: Absence of Infection Signs and Symptoms  Outcome: Progressing  Goal: Improved Oral Intake  Outcome: Progressing  Goal: Optimal Pain Control and Function  Outcome: Progressing  Goal: Skin Health and Integrity  Outcome: Progressing  Goal: Optimal Wound Healing  Outcome: Progressing     Problem: Hemodialysis  Goal: Safe, Effective Therapy Delivery  Outcome: Progressing  Goal: Effective Tissue Perfusion  Outcome: Progressing  Goal: Absence of Infection Signs and Symptoms  Outcome: Progressing

## 2024-07-19 NOTE — PLAN OF CARE
Discharge orders noted - CM awaiting final recommendations for IV abx on hospital discharge. CM anticipating abx to be given with HD. CM closely following for ID recommendations/clearance     07/19/24 1221   Discharge Reassessment   Assessment Type Discharge Planning Reassessment   Did the patient's condition or plan change since previous assessment? Yes

## 2024-07-19 NOTE — PROGRESS NOTES
INPATIENT NEPHROLOGY [place]  [facility]    Patient Name: João Ham  Date: 07/19/2024    Reason for consultation: ESRD    Chief Complaint:   Chief Complaint   Patient presents with    Fever     ON FRIDAY AT DIALYSIS,     ABNORMAL LABS     POS BLOOD CULTURES DRAWN FROM DIALYSIS CATH FRIDAY       History of Present Illness:  44-year-old with past medical history of end-stage renal disease secondary to hypertension and diabetes on HD MWF who comes to the hospital after likely infection of his dialysis catheter. Patient noted fever and chills since Friday- got IV vanc at dialysis. Catheter was positive for Staphylococcus aureus. Patient was admitted for bacteremia, line infection, and ESRD management. Consulted for dialysis.    Interval History:  7/16- blood cx from admit positive- need to get TDC removed- will use AVF tomorrow and if works, will get line out- can use ancef- ordered another set of blood cx today- ordered echo  7/17  pressures a little high today, will UF as tolerated.  Lab results reviewed.  Hopefully, will be able to use AVF today.. No complaints.    7/18  s/p HD yesterday via avf with 2 liter net UF.  AFVSS  7/19  AFVSS.  At a procedure    Plan of Care:    Assessment:  Sepsis due to Staph Aureus bacteremia from infected dialysis catheter  ESRD on HD MWF  HTN  SHPT   Anemia CKD    Plan:    - AVF used 7/17 - if works,  Hemosplit needs removal     - can switch IV vanc to ancef  - daily blood cx until neg; echo with no Vegetations   - HD MWF  - renal diet, 1.5L fluid restriction  - hold home BP meds for now- slowly resume if BP > 150/90  - continue binders, D3 and calcitriol  - ordered LOR with HD    Thank you for allowing us to participate in this patient's care. We will continue to follow.    Vital Signs:  Temp Readings from Last 3 Encounters:   07/19/24 98 °F (36.7 °C) (Oral)   02/27/24 98.3 °F (36.8 °C) (Oral)   02/21/24 98.3 °F (36.8 °C) (Oral)       Pulse Readings from Last 3  Encounters:   07/19/24 65   06/25/24 90   04/30/24 63       BP Readings from Last 3 Encounters:   07/19/24 136/89   06/25/24 138/86   04/30/24 (!) 147/89       Weight:  Wt Readings from Last 3 Encounters:   07/17/24 129.3 kg (285 lb)   07/16/24 128.3 kg (282 lb 13.6 oz)   06/25/24 129.3 kg (285 lb 0.9 oz)       Past Medical & Surgical History:  Past Medical History:   Diagnosis Date    Allergy     Anemia     Anxiety     CHF (congestive heart failure)     Depression     High blood pressure with chronic kidney disease, stage 5 chronic kidney disease or end stage renal disease     Hypertension        Past Surgical History:   Procedure Laterality Date    FISTULOGRAM Bilateral 4/30/2024    Procedure: Fistulogram;  Surgeon: Khoobehi, Ali, MD;  Location: [facility] CATH/EP LAB;  Service: Vascular;  Laterality: Bilateral;    HERNIA REPAIR Right     With mesh    INSERTION, CATHETER, TUNNELED N/A 6/22/2023    Procedure: Insertion,catheter,tunneled;  Surgeon: Everett Caicedo MD;  Location: [facility] OR;  Service: General;  Laterality: N/A;    PERCUTANEOUS TRANSLUMINAL ANGIOPLASTY OF ARTERIOVENOUS FISTULA Left 4/30/2024    Procedure: PTA, AV FISTULA;  Surgeon: Khoobehi, Ali, MD;  Location: 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     Social Determinants of Health     Financial Resource Strain: Low Risk  (7/18/2024)    Overall Financial Resource Strain (CARDIA)     Difficulty of Paying Living Expenses: Not very hard   Food Insecurity: No Food Insecurity (7/18/2024)    Hunger Vital Sign     Worried About Running Out of Food in the Last Year: Never true     Ran Out of Food in the  Last Year: Never true   Transportation Needs: No Transportation Needs (7/18/2024)    TRANSPORTATION NEEDS     Transportation : No   Physical Activity: Sufficiently Active (1/3/2023)    Exercise Vital Sign     Days of Exercise per Week: 6 days     Minutes of Exercise per Session: 60 min   Recent Concern: Physical Activity - Insufficiently Active (10/11/2022)    Exercise Vital Sign     Days of Exercise per Week: 3 days     Minutes of Exercise per Session: 30 min   Stress: Stress Concern Present (7/18/2024)    [facility] - Occupational Stress Questionnaire     Feeling of Stress : Very much   Housing Stability: Low Risk  (7/18/2024)    Housing Stability Vital Sign     Unable to Pay for Housing in the Last Year: No     Homeless in the Last Year: No       Medications:  Scheduled Meds:   ceFAZolin (Ancef) IV (PEDS and ADULTS)  1 g Intravenous Every Mon, Wed, Fri    cloNIDine  0.3 mg Oral TID    epoetin mily-ebpx (RETACRIT) injection  50 Units/kg Intravenous Every Mon, Wed, Fri    famotidine  20 mg Oral Daily    fluticasone propionate  2 spray Each Nostril Daily    guaiFENesin  600 mg Oral BID    hydrALAZINE  100 mg Oral TID    LIDOcaine-prilocaine   Topical (Top) Every Mon, Wed, Fri    metoprolol succinate  150 mg Oral Daily    mupirocin   Nasal BID    sevelamer carbonate  1,600 mg Oral TID WM     Continuous Infusions:  PRN Meds:.  Current Facility-Administered Medications:     acetaminophen, 650 mg, Oral, Q4H PRN    aluminum-magnesium hydroxide-simethicone, 30 mL, Oral, QID PRN    dextrose 50%, 12.5 g, Intravenous, PRN    dextrose 50%, 25 g, Intravenous, PRN    glucagon (human recombinant), 1 mg, Intramuscular, PRN    glucose, 16 g, Oral, PRN    glucose, 24 g, Oral, PRN    magnesium oxide, 800 mg, Oral, PRN    magnesium oxide, 800 mg, Oral, PRN    melatonin, 6 mg, Oral, Nightly PRN    naloxone, 0.02 mg, Intravenous, PRN    ondansetron, 4 mg, Intravenous, Q6H PRN    potassium bicarbonate, 35  mEq, Oral, PRN    potassium bicarbonate, 50 mEq, Oral, PRN    potassium bicarbonate, 60 mEq, Oral, PRN    potassium, sodium phosphates, 2 packet, Oral, PRN    potassium, sodium phosphates, 2 packet, Oral, PRN    potassium, sodium phosphates, 2 packet, Oral, PRN    sodium chloride 0.9%, 10 mL, Intravenous, Q12H PRN  No current facility-administered medications on file prior to encounter.     Current Outpatient Medications on File Prior to Encounter   Medication Sig Dispense Refill    calcitRIOL (ROCALTROL) 0.25 MCG Cap Take 1 capsule by mouth once daily (Patient taking differently: Take 0.25 mcg by mouth once daily.) 90 capsule 1    calcium carbonate (TUMS) 200 mg calcium (500 mg) chewable tablet Take 2 tablets (1,000 mg total) by mouth 3 (three) times daily with meals. (Patient taking differently: Take 1,000 mg by mouth 3 (three) times daily.) 180 tablet 11    cholecalciferol, vitamin D3, 125 mcg (5,000 unit) Tab Take 1 tablet by mouth once daily.      cloNIDine (CATAPRES) 0.3 MG tablet TAKE 1 TABLET BY MOUTH THREE TIMES DAILY 270 tablet 2    hydrALAZINE (APRESOLINE) 100 MG tablet Take 1 tablet (100 mg total) by mouth 3 (three) times daily. 90 tablet 11    isosorbide mononitrate (IMDUR) 120 MG 24 hr tablet Take 120 mg by mouth once daily.      metoprolol succinate (TOPROL-XL) 50 MG 24 hr tablet Take 150 mg by mouth once daily.      olmesartan (BENICAR) 40 MG tablet Take 1 tablet by mouth once daily.      sevelamer carbonate (RENVELA) 800 mg Tab Take 2 tablets (1,600 mg total) by mouth 3 (three) times daily with meals. 180 tablet 11    [DISCONTINUED] ELIQUIS 2.5 mg Tab Take 2.5 mg by mouth 2 (two) times daily.         Allergies:  Mushroom    Past Family History:  Reviewed; refer to Hospitalist Admission Note    Review of Systems:  Review of Systems - All 14 systems reviewed and negative, except as noted in HPI    Physical Exam:  General Appearance:    NAD, AAO x 3, cooperative, appears stated age   Head:     Normocephalic, atraumatic   Eyes:    PER, EOMI, and conjunctiva/sclera clear bilaterally       Mouth:   Moist mucus membranes, no thrush or oral lesions,       normal dentition   Back:     No CVA tenderness   Lungs:     Clear to auscultation bilaterally, no wheezes, crackles,           rales or rhonchi, symmetric air movement, respirations unlabored   Chest wall:    No tenderness or deformity   Heart:    Regular rate and rhythm, S1 and S2 normal, no murmur, rub   or gallop   Abdomen:     Soft, non-tender, non-distended, bowel sounds active all four   quadrants, no RT or guarding, no masses, no organomegaly   Extremities:   Warm and well perfused, distal pulses are intact, no             cyanosis or peripheral edema   MSK:   No joint or muscle swelling, tenderness or deformity   Skin:   Skin color, texture, turgor normal, no rashes or lesions   Neurologic/Psychiatric:   CNII-XII intact, normal strength and sensation       throughout, no asterixis; normal affect, memory, judgement     and insight      Results:  Lab Results   Component Value Date     07/19/2024    K 4.6 07/19/2024    CL 99 07/19/2024    CO2 24 07/19/2024    BUN 39 (H) 07/19/2024    CREATININE 15.1 (H) 07/19/2024    CALCIUM 8.6 (L) 07/19/2024    ANIONGAP 14 07/19/2024       Lab Results   Component Value Date    CALCIUM 8.6 (L) 07/19/2024    PHOS 5.4 (H) 06/28/2023       Recent Labs   Lab 07/19/24  0551   WBC 6.87   RBC 2.83*   HGB 9.2*   HCT 28.2*      *   MCH 32.5*   MCHC 32.6     Imaging Results              X-Ray Chest AP Portable (Final result)  Result time 07/15/24 18:56:49      Final result by Jono Yoder MD (07/15/24 18:56:49)                   Impression:      No acute radiographic abnormality in the chest.      Electronically signed by: Jono Yoder  Date:    07/15/2024  Time:    18:56               Narrative:    EXAMINATION:  XR CHEST AP PORTABLE    CLINICAL HISTORY:  Sepsis;.    TECHNIQUE:  Single frontal portable view of  the chest was performed.    COMPARISON:  02/21/2024    FINDINGS:  Right internal jugular central venous catheter in place with catheter tip projected over the right superior cavoatrial junction/right atrium.  Cardiomediastinal silhouette is within normal limits.Lungs appear grossly clear.  No definite focal consolidation, pleural effusion, or pneumothorax.  -                                    Patient care was time spent personally by me on the following activities:   Obtaining a history  Examination of patient.  Providing medical care at the patients bedside.  Developing a treatment plan with patient or surrogate and bedside caregivers  Ordering and reviewing laboratory studies, radiographic studies, pulse oximetry.  Ordering and performing treatments and interventions.  Evaluation of patient's response to treatment.  Discussions with consultants while on the unit and immediately available to the patient.  Re-evaluation of the patient's condition.  Documentation in the medical record.       Mariano Hickey MD    [facility]  [address] [place] [id number]  [phone] (p)  [phone] (f)

## 2024-07-19 NOTE — NURSING
Received report that pt was c/o pain under his HD catheter dressing and requesting a dressing change    Maintaining aseptic technique, catheter dressing changed, verified no pain anywhere under dressing prior to leaving

## 2024-07-19 NOTE — PLAN OF CARE
Problem: Adult Inpatient Plan of Care  Goal: Plan of Care Review  Outcome: Met  Goal: Patient-Specific Goal (Individualized)  Outcome: Met  Goal: Absence of Hospital-Acquired Illness or Injury  Outcome: Met  Goal: Optimal Comfort and Wellbeing  Outcome: Met  Goal: Readiness for Transition of Care  Outcome: Met     Problem: Infection  Goal: Absence of Infection Signs and Symptoms  Outcome: Met     Problem: Wound  Goal: Optimal Coping  Outcome: Met  Goal: Optimal Functional Ability  Outcome: Met  Goal: Absence of Infection Signs and Symptoms  Outcome: Met  Goal: Improved Oral Intake  Outcome: Met  Goal: Optimal Pain Control and Function  Outcome: Met  Goal: Skin Health and Integrity  Outcome: Met  Goal: Optimal Wound Healing  Outcome: Met     Problem: Hemodialysis  Goal: Safe, Effective Therapy Delivery  Outcome: Met  Goal: Effective Tissue Perfusion  Outcome: Met  Goal: Absence of Infection Signs and Symptoms  Outcome: Met

## 2024-07-20 LAB — BACTERIA UR CULT: NO GROWTH

## 2024-07-20 NOTE — NURSING
Consent and Hep b status verified, removed 2000 uf net, no issues with access, post thrill and bruit noted, patient stable throughout treatment, educated patient on infection control. Report given to Jayashree, floor nurse   07/19/24 1625   Handoff Report   Received From Marta   Given To Jayashree   Vital Signs   Temp 98.1 °F (36.7 °C)   Temp Source Oral   Pulse 68   Heart Rate Source Monitor   Resp 17   SpO2 98 %   Pulse Oximetry Type Intermittent   Probe Placed On (Pulse Ox) Right:;finger   Device (Oxygen Therapy) room air   BP (!) 156/99   BP Location Right arm   BP Method Automatic   Patient Position Lying   Assessments (Pre/Post)   Consent Obtained yes   Safety vein preservation armband present   Date Hepatitis Profile Obtained 06/24/24   Blood Liters Processed (BLP) 49   Transport Modality bed   Level of Consciousness (AVPU) alert   Dialyzer Clearance mildly streaked   Pain   Preferred Pain Scale number (Numeric Rating Pain Scale)   Comfort/Acceptable Pain Level 0   Pain Rating (0-10): Rest 0   Pain Rating (0-10): Activity 0   Pain Management Interventions quiet environment facilitated;position adjusted;pillow support provided   Pain/Comfort Interventions   Fever Reduction/Comfort Measures lightweight clothing;lightweight bedding   Pre-Hemodialysis Assessment   Additional Dialysis Information Needed Yes   Patient Status Departed   Treatment Consent Verified Yes   Treatment Status Completed        Hemodialysis AV Fistula Left forearm   No placement date or time found.   Present Prior to Hospital Arrival?: Yes  Size/Length: 17 G  Location: Left forearm   Site Assessment Clean;Dry;Intact   Patency Present;Thrill;Bruit   Status Deaccessed   Dressing Intervention First dressing   Dressing Status Dry;Clean;Intact   Site Condition No complications   Dressing Gauze   Post-Hemodialysis Assessment   Rinseback Volume (mL) 250 mL   Blood Volume Processed (Liters) 49.1 L   Dialyzer Clearance Lightly streaked   Duration of  Treatment 180 minutes   Additional Fluid Intake (mL) 500 mL   Total UF (mL) 2500 mL   Net Fluid Removal 2000   Patient Response to Treatment miya   Post-Treatment Weight 127.3 kg (280 lb 10.3 oz)   Treatment Weight Change -2   Arterial bleeding stop time (min) 4 min   Venous bleeding stop time (min) 4 min   Post-Hemodialysis Comments stable   Edema   Edema generalized

## 2024-07-21 LAB — BACTERIA CATH TIP CULT: ABNORMAL

## 2024-07-22 ENCOUNTER — TELEPHONE (OUTPATIENT)
Dept: ADMINISTRATIVE | Facility: CLINIC | Age: 44
End: 2024-07-22
Payer: COMMERCIAL

## 2024-07-22 ENCOUNTER — PATIENT OUTREACH (OUTPATIENT)
Dept: ADMINISTRATIVE | Facility: CLINIC | Age: 44
End: 2024-07-22
Payer: COMMERCIAL

## 2024-07-22 NOTE — PROGRESS NOTES
C3 nurse spoke with João Ham for a TCC post hospital discharge follow up call. The patient has a scheduled followup with his PCP, Dawson Granado MD on 9/3/24 at 1115, but does not have a HOSFU with his PCP within the first 5-7 days post discharge date of 7/19/24. Pt requests a message be sent to Dawson Granado MD to see if his HOSFU and 6 month followup can be combined. Pt denies needing a NPHV at this time. Message routed to Dawson Granado MD.

## 2024-07-22 NOTE — TELEPHONE ENCOUNTER
C3 nurse Carol requested assistance in determining if patient was supposed to receive Ancef IVPB at discharge and when it was to be given. Patient states to her that he was not made aware of any IV medication and [facility] told him the medication would cost > $500 copay. Upon review, Mr. Ham was prescribed IV Ancef to be infused on dialysis days, on MWF. But the prescription was sent to [facility] instead of an infusion pharmacy. Phoned [facility] and spoke to Karime who said that they received an e-prescription on 7/18, but they do not supply IVPB. She states the prescription was processed through insurance and they did inform patient that his cost would be ~ $500 had it been a medication they could've provided. Phoned [facility] and was told that they are unable to transfer prescriptions. The provider must send the prescription directly to them or to the infusion pharmacy of choice. Since Dr. Chase appeared not to be on service, a secure chat message was sent to  Amy DE LA TORRE to update regarding the Ancef to see if she could offer assistance. She added Angie RANDHAWA to the chat and she replied that they would take care of it and would follow-up with patient. Informed Carol of the outcome via secure chat.

## 2024-07-22 NOTE — PLAN OF CARE
Spoke with Palma at [facility] on [address], they do not carry Ancef so can't provide even thought HD line related.   Sent referral to [facility] via [facility].   Spoke with Kayleen at [facility] who will jean it as an urgent request to process due to the pt needing today with HD.

## 2024-07-22 NOTE — PLAN OF CARE
BLAKE- 7/22/24 @12:05pm Per Kayleen with [facility] the pt's OP IV abx are covered at 100%, she has reached out to the pt and Sailaja.   The pt is at HD today at 11am and they will be able to get a dose over there today.

## 2024-07-26 NOTE — DISCHARGE SUMMARY
[facility] Medicine  Discharge Summary      Patient Name: João Ham  MRN: [id number]  [facility]: [id number]  Patient Class: IP- Inpatient  Admission Date: 7/15/2024  Hospital Length of Stay: 3 days  Discharge Date and Time: 7/19/2024  8:30 PM  Attending Physician: No att. providers found   Discharging Provider: Mayito Wells MD  Primary Care Provider: Dawson Granado MD    Primary Care Team: Networked reference to record PCT     HPI:   44-year-old with past medical history of end-stage renal disease secondary to hypertension and diabetes who comes to the hospital after likely infection of his dialysis catheter.  Catheter was positive for Staphylococcus aureus.  Patient noted fever and chills on the night of admission.  He was noted to be febrile.  He did not appear to be in septic shock.  Patient was admitted for bacteremia, line infection, and ESRD management.    Procedure(s) (LRB):  Venogram (N/A)  REMOVAL, TUNNELED CATH (Right)      Hospital Course:   Patient was admitted to the hospital for MSSA bacteremia.  He was started on IV antibiotics and improved over the course of his hospitalization with no further evidence of fever or other symptoms.  Likely source of infection was the patient's HemoSplit catheter.  Dialysis was halted through the HemoSplit catheter and was attempted through the patient's forearm fistula.  Dialysis was successful through the fistula, and the HemoSplit catheter was removed on 07/19.  Patient was discharged home with antibiotics to be done at hemodialysis 3 times a week for the next 2 weeks.     Goals of Care Treatment Preferences:  Code Status: Full Code      Consults:   Consults (From admission, onward)          Status Ordering Provider     Inpatient consult to Infectious Diseases  Once        Provider:  (Not yet assigned)    Completed MAYITO WELLS     Inpatient consult to Nephrology  Once        Provider:  Oni Garcia MD    Completed BRAYDEN ROCKWELL             No new Assessment & Plan notes have been filed under this hospital service since the last note was generated.  Service: Hospital Medicine    Final Active Diagnoses:    Diagnosis Date Noted POA    PRINCIPAL PROBLEM:  Bacteremia [R78.81] 07/15/2024 Yes    ESRD (end stage renal disease) [N18.6] 10/10/2022 Yes    Anemia of chronic disease [D63.8] 10/10/2022 Yes    Essential hypertension [I10] 07/17/2024 Yes    Hypertensive heart and renal disease with congestive heart failure [I13.0] 11/29/2022 Yes    Severe obesity with body mass index (BMI) of 35.0 to 39.9 with comorbidity [E66.01] 10/17/2022 Yes      Problems Resolved During this Admission:       Discharged Condition: stable    Disposition: Home or Self Care    Follow Up:   Follow-up Information       Dawson Granado MD Follow up.    Specialty: Family Medicine  Why: As needed  Contact information:  [facility] [address]  [phone]                           Patient Instructions:      Diet diabetic     Diet renal     Diet renal     Notify your health care provider if you experience any of the following:  temperature >100.4     Notify your health care provider if you experience any of the following:  severe uncontrolled pain     Notify your health care provider if you experience any of the following:  increased confusion or weakness     No dressing needed     Activity as tolerated     Activity as tolerated       Significant Diagnostic Studies: N/A    Pending Diagnostic Studies:       None           Medications:  Reconciled Home Medications:      Medication List        START taking these medications      ceFAZolin 1 g/50ml Dextrose IVPB 1 gram/50 mL  Commonly known as: ANCEF  Inject 50 mLs (1 g total) into the vein every Mon, Wed, Fri. for 15 days            CHANGE how you take these medications      calcitRIOL 0.25 MCG Cap  Commonly known as: ROCALTROL  Take 1 capsule by mouth once daily  What changed: when to take this     calcium  carbonate 200 mg calcium (500 mg) chewable tablet  Commonly known as: TUMS  Take 2 tablets (1,000 mg total) by mouth 3 (three) times daily with meals.  What changed: when to take this            CONTINUE taking these medications      cholecalciferol (vitamin D3) 125 mcg (5,000 unit) Tab  Take 1 tablet by mouth once daily.     cloNIDine 0.3 MG tablet  Commonly known as: CATAPRES  TAKE 1 TABLET BY MOUTH THREE TIMES DAILY     hydrALAZINE 100 MG tablet  Commonly known as: APRESOLINE  Take 1 tablet (100 mg total) by mouth 3 (three) times daily.     isosorbide mononitrate 120 MG 24 hr tablet  Commonly known as: IMDUR  Take 120 mg by mouth once daily.     metoprolol succinate 50 MG 24 hr tablet  Commonly known as: TOPROL-XL  Take 150 mg by mouth once daily.     olmesartan 40 MG tablet  Commonly known as: BENICAR  Take 1 tablet by mouth once daily.     sevelamer carbonate 800 mg Tab  Commonly known as: RENVELA  Take 2 tablets (1,600 mg total) by mouth 3 (three) times daily with meals.            STOP taking these medications      ELIQUIS 2.5 mg Tab  Generic drug: apixaban              Indwelling Lines/Drains at time of discharge:   Lines/Drains/Airways       Drain  Duration                  Hemodialysis AV Fistula Left forearm -- days                    Time spent on the discharge of patient: 38 minutes         Mayito Chase MD  Department of Hospital Medicine  [facility]

## 2024-07-30 ENCOUNTER — OFFICE VISIT (OUTPATIENT)
Dept: FAMILY MEDICINE | Facility: CLINIC | Age: 44
End: 2024-07-30
Payer: COMMERCIAL

## 2024-07-30 VITALS
HEART RATE: 95 BPM | TEMPERATURE: 99 F | SYSTOLIC BLOOD PRESSURE: 156 MMHG | DIASTOLIC BLOOD PRESSURE: 110 MMHG | BODY MASS INDEX: 37.09 KG/M2 | WEIGHT: 289 LBS | HEIGHT: 74 IN | RESPIRATION RATE: 18 BRPM | OXYGEN SATURATION: 98 %

## 2024-07-30 DIAGNOSIS — Z09 HOSPITAL DISCHARGE FOLLOW-UP: Primary | ICD-10-CM

## 2024-07-30 DIAGNOSIS — I13.0 HYPERTENSIVE HEART AND RENAL DISEASE WITH CONGESTIVE HEART FAILURE: ICD-10-CM

## 2024-07-30 DIAGNOSIS — I27.20 PULMONARY HYPERTENSION: ICD-10-CM

## 2024-07-30 DIAGNOSIS — N18.6 ESRD (END STAGE RENAL DISEASE): ICD-10-CM

## 2024-07-30 DIAGNOSIS — N52.9 ERECTILE DYSFUNCTION, UNSPECIFIED ERECTILE DYSFUNCTION TYPE: ICD-10-CM

## 2024-07-30 DIAGNOSIS — D63.8 ANEMIA OF CHRONIC DISEASE: ICD-10-CM

## 2024-07-30 DIAGNOSIS — E66.01 SEVERE OBESITY WITH BODY MASS INDEX (BMI) OF 35.0 TO 39.9 WITH COMORBIDITY: ICD-10-CM

## 2024-07-30 PROCEDURE — 3077F SYST BP >= 140 MM HG: CPT | Mod: CPTII,S$GLB,, | Performed by: PHYSICIAN ASSISTANT

## 2024-07-30 PROCEDURE — 99999 PR PBB SHADOW E&M-EST. PATIENT-LVL V: CPT | Mod: PBBFAC,,, | Performed by: PHYSICIAN ASSISTANT

## 2024-07-30 PROCEDURE — 1111F DSCHRG MED/CURRENT MED MERGE: CPT | Mod: CPTII,S$GLB,, | Performed by: PHYSICIAN ASSISTANT

## 2024-07-30 PROCEDURE — 1160F RVW MEDS BY RX/DR IN RCRD: CPT | Mod: CPTII,S$GLB,, | Performed by: PHYSICIAN ASSISTANT

## 2024-07-30 PROCEDURE — 1159F MED LIST DOCD IN RCRD: CPT | Mod: CPTII,S$GLB,, | Performed by: PHYSICIAN ASSISTANT

## 2024-07-30 PROCEDURE — 99495 TRANSJ CARE MGMT MOD F2F 14D: CPT | Mod: S$GLB,,, | Performed by: PHYSICIAN ASSISTANT

## 2024-07-30 PROCEDURE — 3080F DIAST BP >= 90 MM HG: CPT | Mod: CPTII,S$GLB,, | Performed by: PHYSICIAN ASSISTANT

## 2024-07-30 PROCEDURE — 3066F NEPHROPATHY DOC TX: CPT | Mod: CPTII,S$GLB,, | Performed by: PHYSICIAN ASSISTANT

## 2024-07-30 PROCEDURE — 3044F HG A1C LEVEL LT 7.0%: CPT | Mod: CPTII,S$GLB,, | Performed by: PHYSICIAN ASSISTANT

## 2024-07-30 NOTE — PROGRESS NOTES
"Subjective:       Patient ID: João Ham is a 44 y.o. male.    Chief Complaint: No chief complaint on file.    Transitional Care Note  Admit date: 07/15/24  Discharge date: 07/19/24  Date of interactive contact (2 business days post D/C): 07/22/24  Hospitalized at: [facility]  Discharge diagnoses: sepsis, esrd, bacteremia    Family and/or Caretaker present at visit?  No.  Diagnostic tests reviewed/disposition: I have reviewed all completed as well as pending diagnostic tests at the time of discharge.  Disease/illness education: continue dialysis, follow up nephrology, transplant team  Medication changes: eliquis was discontinued but patient has continued to take the medication due to fear of clot  Home health/community services discussion/referrals: Patient does not have home health established from hospital visit.  They do not need home health.  If needed, we will set up home health for the patient.   Establishment or re-establishment of referral orders for community resources: No other necessary community resources.   Discussion with other health care providers: discussed with PCP.       Patient presents to clinic for hospital follow up. "Patient was admitted to the hospital for MSSA bacteremia.  He was started on IV antibiotics and improved over the course of his hospitalization with no further evidence of fever or other symptoms.  Likely source of infection was the patient's HemoSplit catheter.  Dialysis was halted through the HemoSplit catheter and was attempted through the patient's forearm fistula.  Dialysis was successful through the fistula, and the HemoSplit catheter was removed on 07/19.  Patient was discharged home with antibiotics to be done at hemodialysis 3 times a week for the next 2 weeks. "  He denies any further fevers. He has been to dialysis since discharge and has received the IV antibiotics while there. The patient does have concerns about erectile dysfunction. He also has pain when the needles are " inserted for dialysis and would like to use topical lidocaine for this.          [facility] Medicine  Discharge Summary        Patient Name: João Ham  MRN: [id number]  [facility]: [id number]  Patient Class: IP- Inpatient  Admission Date: 7/15/2024  Hospital Length of Stay: 3 days  Discharge Date and Time: 7/19/2024  8:30 PM  Attending Physician: Blanche att. providers found   Discharging Provider: Mayito Wells MD  Primary Care Provider: Dawson Granado MD     Primary Care Team: Networked reference to record PCT      HPI:   44-year-old with past medical history of end-stage renal disease secondary to hypertension and diabetes who comes to the hospital after likely infection of his dialysis catheter.  Catheter was positive for Staphylococcus aureus.  Patient noted fever and chills on the night of admission.  He was noted to be febrile.  He did not appear to be in septic shock.  Patient was admitted for bacteremia, line infection, and ESRD management.     Procedure(s) (LRB):  Venogram (N/A)  REMOVAL, TUNNELED CATH (Right)       Hospital Course:   Patient was admitted to the hospital for MSSA bacteremia.  He was started on IV antibiotics and improved over the course of his hospitalization with no further evidence of fever or other symptoms.  Likely source of infection was the patient's HemoSplit catheter.  Dialysis was halted through the HemoSplit catheter and was attempted through the patient's forearm fistula.  Dialysis was successful through the fistula, and the HemoSplit catheter was removed on 07/19.  Patient was discharged home with antibiotics to be done at hemodialysis 3 times a week for the next 2 weeks.      Goals of Care Treatment Preferences:  Code Status: Full Code        Consults:   Consults (From admission, onward)           Status Ordering Provider        Inpatient consult to Infectious Diseases  Once       Provider:  (Not yet assigned)   Completed MAYITO WELLS        Inpatient  consult to Nephrology  Once       Provider:  Oni Garcia MD   Completed BRAYDEN ROCKWELL              No new Assessment & Plan notes have been filed under this hospital service since the last note was generated.  Service: Hospital Medicine     Final Active Diagnoses:    Diagnosis Date Noted POA  · PRINCIPAL PROBLEM:  Bacteremia [R78.81] 07/15/2024 Yes  · ESRD (end stage renal disease) [N18.6] 10/10/2022 Yes  · Anemia of chronic disease [D63.8] 10/10/2022 Yes  · Essential hypertension [I10] 07/17/2024 Yes  · Hypertensive heart and renal disease with congestive heart failure [I13.0] 11/29/2022 Yes  · Severe obesity with body mass index (BMI) of 35.0 to 39.9 with comorbidity [E66.01] 10/17/2022 Yes     Problems Resolved During this Admission:        Discharged Condition: stable     Disposition: Home or Self Care     Follow Up:      Follow-up Information            Dawson Granado MD Follow up.   Specialty: Family Medicine  Why: As needed  Contact information:  [address] [facility] [address] [phone]                                  Patient Instructions:      Diet diabetic     Diet renal     Diet renal     Notify your health care provider if you experience any of the following:  temperature >100.4     Notify your health care provider if you experience any of the following:  severe uncontrolled pain     Notify your health care provider if you experience any of the following:  increased confusion or weakness     No dressing needed     Activity as tolerated     Activity as tolerated        Significant Diagnostic Studies: N/A     Pending Diagnostic Studies:        None           Medications:  Reconciled Home Medications:      Medication List        START taking these medications      ceFAZolin 1 g/50ml Dextrose IVPB 1 gram/50 mL  Commonly known as: ANCEF  Inject 50 mLs (1 g total) into the vein every Mon, Wed, Fri. for 15 days              CHANGE how you take these medications      calcitRIOL 0.25 MCG  Cap  Commonly known as: ROCALTROL  Take 1 capsule by mouth once daily  What changed: when to take this     calcium carbonate 200 mg calcium (500 mg) chewable tablet  Commonly known as: TUMS  Take 2 tablets (1,000 mg total) by mouth 3 (three) times daily with meals.  What changed: when to take this              CONTINUE taking these medications      cholecalciferol (vitamin D3) 125 mcg (5,000 unit) Tab  Take 1 tablet by mouth once daily.     cloNIDine 0.3 MG tablet  Commonly known as: CATAPRES  TAKE 1 TABLET BY MOUTH THREE TIMES DAILY     hydrALAZINE 100 MG tablet  Commonly known as: APRESOLINE  Take 1 tablet (100 mg total) by mouth 3 (three) times daily.     isosorbide mononitrate 120 MG 24 hr tablet  Commonly known as: IMDUR  Take 120 mg by mouth once daily.     metoprolol succinate 50 MG 24 hr tablet  Commonly known as: TOPROL-XL  Take 150 mg by mouth once daily.     olmesartan 40 MG tablet  Commonly known as: BENICAR  Take 1 tablet by mouth once daily.     sevelamer carbonate 800 mg Tab  Commonly known as: RENVELA  Take 2 tablets (1,600 mg total) by mouth 3 (three) times daily with meals.              STOP taking these medications      ELIQUIS 2.5 mg Tab  Generic drug: apixaban                       Allergen Reactions    Mushroom Swelling     Tongue swells          Current Outpatient Medications:     calcitRIOL (ROCALTROL) 0.25 MCG Cap, Take 1 capsule by mouth once daily (Patient taking differently: Take 0.25 mcg by mouth once daily.), Disp: 90 capsule, Rfl: 1    calcium carbonate (TUMS) 200 mg calcium (500 mg) chewable tablet, Take 2 tablets (1,000 mg total) by mouth 3 (three) times daily with meals. (Patient taking differently: Take 1,000 mg by mouth 3 (three) times daily.), Disp: 180 tablet, Rfl: 11    ceFAZolin 1 g/50ml Dextrose IVPB (ANCEF) 1 gram/50 mL, Inject 50 mLs (1 g total) into the vein every Mon, Wed, Fri. for 15 days, Disp: 300 mL, Rfl: 0    cholecalciferol, vitamin D3, 125 mcg (5,000 unit) Tab,  Take 1 tablet by mouth once daily., Disp: , Rfl:     cloNIDine (CATAPRES) 0.3 MG tablet, TAKE 1 TABLET BY MOUTH THREE TIMES DAILY, Disp: 270 tablet, Rfl: 2    hydrALAZINE (APRESOLINE) 100 MG tablet, Take 1 tablet (100 mg total) by mouth 3 (three) times daily., Disp: 90 tablet, Rfl: 11    isosorbide mononitrate (IMDUR) 120 MG 24 hr tablet, Take 120 mg by mouth once daily., Disp: , Rfl:     metoprolol succinate (TOPROL-XL) 50 MG 24 hr tablet, Take 150 mg by mouth once daily., Disp: , Rfl:     olmesartan (BENICAR) 40 MG tablet, Take 1 tablet by mouth once daily., Disp: , Rfl:     sevelamer carbonate (RENVELA) 800 mg Tab, Take 2 tablets (1,600 mg total) by mouth 3 (three) times daily with meals., Disp: 180 tablet, Rfl: 11    Lab Results   Component Value Date    WBC 6.87 07/19/2024    HGB 9.2 (L) 07/19/2024    HCT 28.2 (L) 07/19/2024     07/19/2024    CHOL 141 10/11/2022    TRIG 66 10/11/2022    HDL 39 (L) 10/11/2022    ALT 24 07/19/2024    AST 21 07/19/2024     07/19/2024    K 4.6 07/19/2024    CL 99 07/19/2024    CREATININE 15.1 (H) 07/19/2024    BUN 39 (H) 07/19/2024    CO2 24 07/19/2024    TSH 1.699 10/10/2022    PSA 0.48 06/25/2024    INR 1.0 02/02/2023    HGBA1C 6.1 07/16/2024       Review of Systems   Constitutional:  Negative for activity change, appetite change and fever.   HENT:  Negative for postnasal drip, rhinorrhea and sinus pressure.    Eyes:  Negative for visual disturbance.   Respiratory:  Negative for cough and shortness of breath.    Cardiovascular:  Negative for chest pain.   Gastrointestinal:  Negative for abdominal distention and abdominal pain.   Genitourinary:  Negative for difficulty urinating and dysuria.   Musculoskeletal:  Negative for arthralgias and myalgias.   Neurological:  Negative for headaches.   Hematological:  Negative for adenopathy.   Psychiatric/Behavioral:  The patient is not nervous/anxious.        Objective:      Physical Exam  Constitutional:       Appearance:  Normal appearance.   HENT:      Head: Normocephalic and atraumatic.   Eyes:      Conjunctiva/sclera: Conjunctivae normal.   Cardiovascular:      Rate and Rhythm: Normal rate and regular rhythm.   Pulmonary:      Effort: Pulmonary effort is normal. No respiratory distress.      Breath sounds: Normal breath sounds. No wheezing.   Abdominal:      General: There is no distension.      Palpations: There is no mass.      Tenderness: There is no abdominal tenderness.   Musculoskeletal:      Right lower leg: No edema.      Left lower leg: No edema.   Lymphadenopathy:      Cervical: No cervical adenopathy.   Skin:     Findings: No erythema.   Neurological:      Mental Status: He is alert and oriented to person, place, and time.   Psychiatric:         Behavior: Behavior normal.         Assessment:       1. Hospital discharge follow-up    2. ESRD (end stage renal disease)    3. Anemia of chronic disease    4. Pulmonary hypertension    5. Hypertensive heart and renal disease with congestive heart failure    6. Severe obesity with body mass index (BMI) of 35.0 to 39.9 with comorbidity    7. Erectile dysfunction, unspecified erectile dysfunction type        Plan:       João was seen today for hospital follow up.    Diagnoses and all orders for this visit:    Hospital discharge follow-up  Patient stable  ESRD (end stage renal disease)  -     Ambulatory referral/consult to Vascular Surgery; Future- patient to get second opinion from Dr. Caicedo to discuss need for eliquis.     Anemia of chronic disease  stable  Pulmonary hypertension  Follow up cardiology   Hypertensive heart and renal disease with congestive heart failure  Not well controlled- patient to clonidine this morning and no other medications. Discussed rebound HTN  Patient advised to take other medication as soon as her returns [place]  Severe obesity with body mass index (BMI) of 35.0 to 39.9 with comorbidity  Low carbohydrate, high fiber diet    Erectile dysfunction,  unspecified erectile dysfunction type  -     Ambulatory referral/consult to Urology; Future      Will request labs and updated vital signs from dialysis tomorrow.

## 2024-08-01 ENCOUNTER — OFFICE VISIT (OUTPATIENT)
Dept: UROLOGY | Facility: CLINIC | Age: 44
End: 2024-08-01
Payer: COMMERCIAL

## 2024-08-01 DIAGNOSIS — N52.9 ERECTILE DYSFUNCTION, UNSPECIFIED ERECTILE DYSFUNCTION TYPE: ICD-10-CM

## 2024-08-01 PROCEDURE — 3044F HG A1C LEVEL LT 7.0%: CPT | Mod: CPTII,NTX,S$GLB, | Performed by: NURSE PRACTITIONER

## 2024-08-01 PROCEDURE — 3066F NEPHROPATHY DOC TX: CPT | Mod: CPTII,NTX,S$GLB, | Performed by: NURSE PRACTITIONER

## 2024-08-01 PROCEDURE — 99213 OFFICE O/P EST LOW 20 MIN: CPT | Mod: NTX,S$GLB,, | Performed by: NURSE PRACTITIONER

## 2024-08-01 PROCEDURE — 99999 PR PBB SHADOW E&M-EST. PATIENT-LVL III: CPT | Mod: PBBFAC,TXP,, | Performed by: NURSE PRACTITIONER

## 2024-08-01 PROCEDURE — 4010F ACE/ARB THERAPY RXD/TAKEN: CPT | Mod: CPTII,NTX,S$GLB, | Performed by: NURSE PRACTITIONER

## 2024-08-01 PROCEDURE — 1111F DSCHRG MED/CURRENT MED MERGE: CPT | Mod: CPTII,NTX,S$GLB, | Performed by: NURSE PRACTITIONER

## 2024-08-01 NOTE — PROGRESS NOTES
[facility] Note - [place]  Staff: JOHN Genao  PCP: Dawson Granado MD  Date of Service: 2024    CC:ED    Subjective:        HPI: João Ham is a 44 y.o. male     He states he is not able to get an erection hard enough for penetration 85% percent of the time. He has been having this problem for Several years.  .   Last OV with me on 2023 for LUTS and ED issues:  UA in office today showed--Pt was unable to give urine sample in office.  Pt denies gross hematuria or dysuria at this time.  ++Increased urinary frequency problems and erectile dysfunction issues.  Pt was recently started on Flomax 0.4 mg daily from his PCP office.  Recent PSA is normal.     Recent hx of CKD, Stage 5, uncontrolled HBP, and CHF!  Pt currently taking Lasix 80 mg TID since hospital admission last year.     AUA SS:  14/4  Feeling of ICBE:  3  Frequency:2  Intermittency:0  Urgency:4  Weak urine stream:0  Strainin  Nocturia:5  PVR by bladder scan performed by MA today:  81 mL     VERONICA Score:  10  1.1  2.2  3.2  4.3  5.2     Recent Lab Results done on 2023:  Renal Function Panel:  BUN/Cr-67/6.8  Calcium 8.3  eGFR of 9.7!!!!  PSA level WNL    PSA, Screen (ng/mL)   Date Value   2024 0.48     REVIEW OF SYSTEMS:  Negative except for as stated above    Past Medical History:   Diagnosis Date    Allergy     Anemia     Anxiety     CHF (congestive heart failure)     Depression     High blood pressure with chronic kidney disease, stage 5 chronic kidney disease or end stage renal disease     Hypertension        Past Surgical History:   Procedure Laterality Date    FISTULOGRAM Bilateral 2024    Procedure: Fistulogram;  Surgeon: Khoobehi, Ali, MD;  Location: [facility] CATH/EP LAB;  Service: Vascular;  Laterality: Bilateral;    HERNIA REPAIR Right     With mesh    INSERTION, CATHETER, TUNNELED N/A 2023    Procedure: Insertion,catheter,tunneled;  Surgeon: Everett Caicedo MD;  Location:  [facility] OR;  Service: General;  Laterality: N/A;    PERCUTANEOUS TRANSLUMINAL ANGIOPLASTY OF ARTERIOVENOUS FISTULA Left 4/30/2024    Procedure: PTA, AV FISTULA;  Surgeon: Khoobehi, Ali, MD;  Location: [facility] CATH/EP LAB;  Service: Vascular;  Laterality: Left;    PHLEBOGRAPHY N/A 7/19/2024    Procedure: Venogram;  Surgeon: Khoobehi, Ali, MD;  Location: [facility] CATH/EP LAB;  Service: Vascular;  Laterality: N/A;    REMOVAL, TUNNELED CATH Right 7/19/2024    Procedure: REMOVAL, TUNNELED CATH;  Surgeon: Khoobehi, Ali, MD;  Location: [facility] CATH/EP LAB;  Service: Vascular;  Laterality: Right;           Objective:     There were no vitals filed for this visit.    Focused  exam  Pt Deferred    Assessment:     João Ham is a 44 y.o. male with erectile dysfunction        1. Erectile dysfunction, unspecified erectile dysfunction type                  Plan:      Advised pt we would not be proceeding further with any treatment of ED issues until other medical conditions have been evaluated and treated by PCP and Nephrology in the near future and for clearance.  Then he would need to be approved and established by one of the Urologists in the future if candidate for any ED meds.      Follow up with Urologist to discuss further evaluation and treatment options.    For his erectile dysfunction:   I think the patient's contributing factors are          Noa Hunt, JOHN

## 2024-08-06 ENCOUNTER — PATIENT MESSAGE (OUTPATIENT)
Dept: PULMONOLOGY | Facility: CLINIC | Age: 44
End: 2024-08-06
Payer: COMMERCIAL

## 2024-08-06 ENCOUNTER — TELEPHONE (OUTPATIENT)
Dept: TRANSPLANT | Facility: CLINIC | Age: 44
End: 2024-08-06
Payer: COMMERCIAL

## 2024-08-15 ENCOUNTER — LAB VISIT (OUTPATIENT)
Dept: LAB | Facility: HOSPITAL | Age: 44
End: 2024-08-15
Payer: COMMERCIAL

## 2024-08-15 DIAGNOSIS — Z76.82 ORGAN TRANSPLANT CANDIDATE: ICD-10-CM

## 2024-08-22 ENCOUNTER — OFFICE VISIT (OUTPATIENT)
Dept: TRANSPLANT | Facility: CLINIC | Age: 44
End: 2024-08-22
Payer: COMMERCIAL

## 2024-08-22 VITALS
HEART RATE: 110 BPM | OXYGEN SATURATION: 97 % | HEIGHT: 74 IN | BODY MASS INDEX: 37.06 KG/M2 | SYSTOLIC BLOOD PRESSURE: 137 MMHG | TEMPERATURE: 97 F | WEIGHT: 288.81 LBS | DIASTOLIC BLOOD PRESSURE: 91 MMHG

## 2024-08-22 DIAGNOSIS — Z76.82 PATIENT ON WAITING LIST FOR KIDNEY TRANSPLANT: Primary | ICD-10-CM

## 2024-08-22 DIAGNOSIS — I10 ESSENTIAL HYPERTENSION: ICD-10-CM

## 2024-08-22 DIAGNOSIS — E21.3 HYPERPARATHYROIDISM: ICD-10-CM

## 2024-08-22 DIAGNOSIS — Z99.2 ESRD ON HEMODIALYSIS: ICD-10-CM

## 2024-08-22 DIAGNOSIS — N18.6 ESRD ON HEMODIALYSIS: ICD-10-CM

## 2024-08-22 PROCEDURE — 3066F NEPHROPATHY DOC TX: CPT | Mod: CPTII,S$GLB,TXP, | Performed by: NURSE PRACTITIONER

## 2024-08-22 PROCEDURE — 3044F HG A1C LEVEL LT 7.0%: CPT | Mod: CPTII,S$GLB,TXP, | Performed by: NURSE PRACTITIONER

## 2024-08-22 PROCEDURE — 3080F DIAST BP >= 90 MM HG: CPT | Mod: CPTII,S$GLB,TXP, | Performed by: NURSE PRACTITIONER

## 2024-08-22 PROCEDURE — 3008F BODY MASS INDEX DOCD: CPT | Mod: CPTII,S$GLB,TXP, | Performed by: NURSE PRACTITIONER

## 2024-08-22 PROCEDURE — 99999 PR PBB SHADOW E&M-EST. PATIENT-LVL IV: CPT | Mod: PBBFAC,TXP,, | Performed by: NURSE PRACTITIONER

## 2024-08-22 PROCEDURE — 1159F MED LIST DOCD IN RCRD: CPT | Mod: CPTII,S$GLB,TXP, | Performed by: NURSE PRACTITIONER

## 2024-08-22 PROCEDURE — 3075F SYST BP GE 130 - 139MM HG: CPT | Mod: CPTII,S$GLB,TXP, | Performed by: NURSE PRACTITIONER

## 2024-08-22 PROCEDURE — 99215 OFFICE O/P EST HI 40 MIN: CPT | Mod: S$GLB,TXP,, | Performed by: NURSE PRACTITIONER

## 2024-08-22 NOTE — LETTER
August 26, 2024        Willy Hanna  [facility] [address]  Phone: [phone]  Fax: [phone]             [facility] 1st Fl  [address]  [facility] [address]  Phone: [phone]   Patient: João Ham   MR Number: [id number]   YOB: 1980   Date of Visit: 8/22/2024       Dear Dr. Willy Hanna    Thank you for referring João Ham to me for evaluation. Attached you will find relevant portions of my assessment and plan of care.    If you have questions, please do not hesitate to call me. I look forward to following João Ham along with you.    Sincerely,    Chelo Valentino, NP    Enclosure    If you would like to receive this communication electronically, please contact [email or web address] or [phone] to request [employer] access.    [employer] is a tool which provides read-only access to select patient information with whom you have a relationship. Its easy to use and provides real time access to review your patients record including encounter summaries, notes, results, and demographic information.    If you feel you have received this communication in error or would no longer like to receive these types of communications, please e-mail [email or web address]

## 2024-08-22 NOTE — PROGRESS NOTES
Kidney Transplant Recipient Reevalulation    Referring Physician: Willy Hanna  Current Nephrologist: Willy Hanna  Waitlist Status: active  Dialysis Start Date: 9/8/2023    Subjective:     CC:  Annual reassessment of kidney transplant candidacy.    HPI:  Mr. Ham is a 44 y.o. year old Black or  male with ESRD secondary to HTN (biopsy proven 10/2022).  He has been on the wait list for a kidney transplant at [facility] since 6/6/2023. Patient is currently on hemodialysis started on 9/8/2023. Patient is dialyzing on MWF schedule.  Patient reports that he is tolerating dialysis well.. He has a LUE AV fistula.     Here today with his friend for first wait list visit. Has started dialysis since last week, tolerating well. Initially had permacath but removed and now using AVF after Staphylococcus aureus infection. Completed antibiotics 2 weeks ago. Remains active, not frail. Reports potential donor currently going through evaluation.    Currently on eliquis for AVF patency started by vascular surgery.     CXR 7/15/2024: No acute radiographic abnormality in the chest.   PXR 2/2/2023: favorable for transplant    Renal US 6/25/2024: chronic medical renal disease. Left renal simple cysts. No solid renal masses.  Iliacs 6/25/2024: favorable for transplant  Echo 6/25/2024: EF 65-70%, PA 22  Stress 2/16/2023:  no evidence of myocardial ischemia or infarction.     Current Outpatient Medications   Medication Sig Dispense Refill    apixaban (ELIQUIS) 2.5 mg Tab Take 2.5 mg by mouth 2 (two) times daily.      calcitRIOL (ROCALTROL) 0.25 MCG Cap Take 1 capsule by mouth once daily (Patient taking differently: Take 0.25 mcg by mouth once daily.) 90 capsule 1    cholecalciferol, vitamin D3, 125 mcg (5,000 unit) Tab Take 1 tablet by mouth once daily.      cloNIDine (CATAPRES) 0.3 MG tablet TAKE 1 TABLET BY MOUTH THREE TIMES DAILY 270 tablet 2    isosorbide mononitrate (IMDUR) 120 MG 24 hr tablet Take 120 mg by  "mouth once daily.      metoprolol succinate (TOPROL-XL) 50 MG 24 hr tablet Take 150 mg by mouth once daily.      olmesartan (BENICAR) 40 MG tablet Take 1 tablet by mouth once daily.      sevelamer carbonate (RENVELA) 800 mg Tab Take 2 tablets (1,600 mg total) by mouth 3 (three) times daily with meals. 180 tablet 11    calcium carbonate (TUMS) 200 mg calcium (500 mg) chewable tablet Take 2 tablets (1,000 mg total) by mouth 3 (three) times daily with meals. (Patient taking differently: Take 1,000 mg by mouth 3 (three) times daily.) 180 tablet 11    hydrALAZINE (APRESOLINE) 100 MG tablet Take 1 tablet (100 mg total) by mouth 3 (three) times daily. 90 tablet 11     No current facility-administered medications for this visit.       Past Medical History:   Diagnosis Date    Allergy     Anemia     Anxiety     CHF (congestive heart failure)     Depression     High blood pressure with chronic kidney disease, stage 5 chronic kidney disease or end stage renal disease     Hypertension      Review of Systems   Constitutional:  Positive for fatigue. Negative for activity change and fever.   Eyes:  Negative for visual disturbance.   Respiratory:  Negative for cough and shortness of breath.    Cardiovascular:  Negative for chest pain and leg swelling.   Gastrointestinal:  Negative for abdominal pain, constipation, diarrhea and nausea.   Genitourinary:  Positive for decreased urine volume. Negative for difficulty urinating, frequency and hematuria.        Voiding 1-2x/day   Musculoskeletal:  Negative for arthralgias and myalgias.   Skin:  Negative for wound.   Neurological:  Negative for weakness.   Psychiatric/Behavioral:  Negative for sleep disturbance.        Objective:   body mass index is 37.08 kg/m².  BP (!) 137/91 (BP Location: Right arm, Patient Position: Sitting, BP Method: Large (Automatic))   Pulse 110   Temp 97.3 °F (36.3 °C) (Temporal)   Ht 6' 2" (1.88 m)   Wt 131 kg (288 lb 12.8 oz)   SpO2 97%   BMI 37.08 kg/m² "     Physical Exam  Vitals and nursing note reviewed.   Constitutional:       Appearance: Normal appearance.   Cardiovascular:      Rate and Rhythm: Normal rate and regular rhythm.      Heart sounds: Normal heart sounds.   Pulmonary:      Effort: Pulmonary effort is normal.      Breath sounds: Normal breath sounds.   Abdominal:      General: There is no distension.   Musculoskeletal:         General: Normal range of motion.      Comments: AVF ++   Skin:     General: Skin is warm and dry.   Neurological:      Mental Status: He is alert.         Labs:  PSA, Screen (ng/mL)   Date Value   06/25/2024 0.48     Lab Results   Component Value Date    WBC 6.87 07/19/2024    HGB 9.2 (L) 07/19/2024    HCT 28.2 (L) 07/19/2024     07/19/2024    K 4.6 07/19/2024    CL 99 07/19/2024    CO2 24 07/19/2024    BUN 39 (H) 07/19/2024    CREATININE 15.1 (H) 07/19/2024    EGFRNORACEVR 3.7 (A) 07/19/2024    CALCIUM 8.6 (L) 07/19/2024    PHOS 5.4 (H) 06/28/2023    MG 2.1 07/19/2024    ALBUMIN 3.4 (L) 07/19/2024    AST 21 07/19/2024    ALT 24 07/19/2024    UTPCR 2.61 (H) 06/19/2023    .0 (H) 06/19/2023       Lab Results   Component Value Date    BILIRUBINUA Negative 07/17/2024    PROTEINUA 3+ (A) 07/17/2024    NITRITE Negative 07/17/2024    RBCUA 6 (H) 07/17/2024    WBCUA 52 (H) 07/17/2024     Lab Results   Component Value Date    CPRA 1 06/03/2024    SJ5OQYX A34 06/03/2024    CIABCLM WEAK A66 06/03/2024    CIIAB Negative 06/03/2024       Labs were reviewed with the patient.    Pre-transplant Workup:   Reviewed with the patient.    Assessment:     1. Patient on waiting list for kidney transplant    2. ESRD on hemodialysis    3. Essential hypertension    4. Hyperparathyroidism    5. BMI 37.0-37.9, adult      Plan:     Transplant Candidacy:   Mr. Ham is a suitable kidney transplant candidate.  Meets center eligibility for accepting HCV+ donor offer - Yes.  Patient educated on HCV+ donors. João is willing  to accept HCV+ donor  offer -  Yes   Patient is a candidate for KDPI > 85 kidney donor offer - No (weight > 88 kg).  He remains in overall stable health, and will remain active on the transplant list.    Patient advised that it is recommended that all transplant candidates, and their close contacts and household members receive Covid vaccination.    Chelo Valentino NP       Follow-up:   In addition to the tests noted in the plan, Mr. Ham will continue to have reevaluation as per the standing pre-kidney transplant protocol:  Monthly blood for PRA  Annual return to clinic, except HIV positive, > 65 years of age, or pancreas transplant candidates who will be scheduled to see transplant every 6 months while in pre-transplant phase  Annual re-testing: CXR, EKG, yearly mammograms for women over 40 and PSA for males over 40, cardiology follow-up as recommended by initial cardiology pre-transplant evaluation  Renal ultrasound every 2 years  Baseline colonoscopy after age 50 and repeated as recommended    UNOS Patient Status  Functional Status: 60% - Requires occasional assistance but is able to care for needs  Physical Capacity: No Limitations

## 2024-08-28 ENCOUNTER — TELEPHONE (OUTPATIENT)
Dept: PULMONOLOGY | Facility: CLINIC | Age: 44
End: 2024-08-28
Payer: COMMERCIAL

## 2024-08-29 ENCOUNTER — OFFICE VISIT (OUTPATIENT)
Dept: PULMONOLOGY | Facility: CLINIC | Age: 44
End: 2024-08-29
Payer: COMMERCIAL

## 2024-08-29 VITALS
WEIGHT: 289.44 LBS | OXYGEN SATURATION: 97 % | SYSTOLIC BLOOD PRESSURE: 99 MMHG | HEIGHT: 74 IN | BODY MASS INDEX: 37.15 KG/M2 | HEART RATE: 115 BPM | DIASTOLIC BLOOD PRESSURE: 68 MMHG

## 2024-08-29 DIAGNOSIS — G47.33 OBSTRUCTIVE SLEEP APNEA ON CPAP: Primary | ICD-10-CM

## 2024-08-29 PROCEDURE — 99999 PR PBB SHADOW E&M-EST. PATIENT-LVL IV: CPT | Mod: PBBFAC,TXP,, | Performed by: NURSE PRACTITIONER

## 2024-08-29 NOTE — PROGRESS NOTES
8/29/2024    João Ham  In office visit     Chief Complaint   Patient presents with    Follow-up    Apnea       HPI:  8/29/2024- wearing CPAP nightly, has full face mask, tubing is falling off of mask. Wakes up feeling like he has gas and belching. Compliance report shows 87% > 4 hour use.   No complaint of daytime drowsiness. Feeling better after waking up.     09/28/2023:  Underwent PSG in March 2023 revealing AHI 72.7 - subsequently underwent Titration study in June 2023 revealing the need for Bipap therapy.   States he received his BiPAP machine approximately 2 months ago.  Has been using most nights with great reported benefit.  He does notice that he is able to sleep for longer duration of time throughout the night whereas in the past he would not sleep longer than 2-3 hours at any given time.  Works night shifts, endorses less fatigue while awake.  Is currently using a fullface mask however does experience frequent air leaks throughout the night.  Feels as if the pressures are okay upon initiation of BiPAP, however can notice a difference whenever the pressures are increased.  Patient states he suffered with a period of chest and sinus congestion over the last month which hindered his use of BiPAP at that time.  Patient is interested in BiPAP compliance and therapy.  BiPAP compliance report reviewed from dates 08/28/2023 -09/26/2023  Usage greater than equal to 4 hours 63%  25/4  95th percentile air leak - 42.8  AHI 15.3  Recently also started HD for chronic kidney disease.  Currently receiving HD every Monday Wednesday Friday.  Patient receiving HD through [facility].  States he is hopeful for upcoming renal transplant.          12/13/2022:  Denies history of lung disease, inhaler use, supplemental oxygen use, or CPAP use. Denies personal history of cancer, PE, anticoagulation use.   Recent hospitalization at [facility] on 10/10/22 - patient diagnosed with acute renal failure, CHF, HTN Emergency,  and Pulmonary Edema. Patient underwent cardiac and nephrology consults while inpatient. Underwent renal biopsy. Was subsequently dc home on 10/18. Patient currently following with nephrology, Dr. Hanna - is not currently on dialysis however anticipates may need in the future, possible work up for kidney transplant.   Endorses daytime fatigue, nocturnal arousals. Denies morning headaches, depression.  Denies wheezing, chest tightness. Denies cough, mucous production.   Does endorse shortness of breath prior to hospitalization however since discharged home has improved.   Diagnosed with COVID and subsequent pneumonia in Jan 2022 - did not require hospitalization at that time.   Patient Instructions   I have ordered an at home sleep study to evaluate for sleep apnea. If test is positive, I will order a CPAP machine. Please notify my clinic when you receive the machine to set up a 31 day compliance appointment. You must use the machine for a least 4 hours every night to avoid insurance denial.   Continue current medication regiment. Keep follow up appointment as scheduled. Please call the office if you have any questions or concerns.         Social Hx: Lives with nephew - no animals in the home. Work at [employer]; however has been out of work since October. No Asbestosis exposure, Smoking Hx: Never smoker  Family Hx: No Lung Cancer, No COPD, No Asthma  Medical Hx: Previous pneumonia ; No previous shoulder/chest surgery        The chief compliant problem varies with instability at times.   PFSH:  Past Medical History:   Diagnosis Date    Allergy     Anemia     Anxiety     CHF (congestive heart failure)     Depression     High blood pressure with chronic kidney disease, stage 5 chronic kidney disease or end stage renal disease     Hypertension          Past Surgical History:   Procedure Laterality Date    FISTULOGRAM Bilateral 4/30/2024    Procedure: Fistulogram;  Surgeon: Khoobehi, Ali, MD;  Location: [facility] CATH/EP  "LAB;  Service: Vascular;  Laterality: Bilateral;    HERNIA REPAIR Right     With mesh    INSERTION, CATHETER, TUNNELED N/A 6/22/2023    Procedure: Insertion,catheter,tunneled;  Surgeon: Everett Caicedo MD;  Location: [facility] OR;  Service: General;  Laterality: N/A;    PERCUTANEOUS TRANSLUMINAL ANGIOPLASTY OF ARTERIOVENOUS FISTULA Left 4/30/2024    Procedure: PTA, AV FISTULA;  Surgeon: Khoobehi, Ali, MD;  Location: [facility] CATH/EP LAB;  Service: Vascular;  Laterality: Left;    PHLEBOGRAPHY N/A 7/19/2024    Procedure: Venogram;  Surgeon: Khoobehi, Ali, MD;  Location: [facility] CATH/EP LAB;  Service: Vascular;  Laterality: N/A;    REMOVAL, TUNNELED CATH Right 7/19/2024    Procedure: REMOVAL, TUNNELED CATH;  Surgeon: Khoobehi, Ali, MD;  Location: [facility] CATH/EP LAB;  Service: Vascular;  Laterality: Right;     Social History     Tobacco Use    Smoking status: Never     Passive exposure: Never    Smokeless tobacco: Never   Substance Use Topics    Alcohol use: Never    Drug use: Never     No family history on file.  Review of patient's allergies indicates:   Allergen Reactions    Mushroom Swelling     Tongue swells    Tongue swells       Tongue swells     I have reviewed past medical, family, and social history. I have reviewed previous nurse notes.    Performance Status:The patient's activity level is functions out of house.      Review of Systems:  a review of eleven systems covering constitutional, Eye, HEENT, Psych, Respiratory, Cardiac, GI, , Musculoskeletal, Endocrine, Dermatologic was negative except for pertinent findings as listed ABOVE and below: pertinent positive as above, rest is good  Dry mouth     Exam:Comprehensive exam done. BP 99/68 (BP Location: Right arm, Patient Position: Sitting, BP Method: Large (Automatic))   Pulse (!) 115   Ht 6' 2" (1.88 m)   Wt 131.3 kg (289 lb 7.4 oz)   SpO2 97% Comment: on room air at rest  BMI 37.16 kg/m²   Exam included Vitals as listed  Constitutional: He is oriented to person, " place, and time. He appears well-developed. No distress.   Nose: Nose normal.   Mouth/Throat: Uvula is midline, oropharynx is clear and moist and mucous membranes are normal. No dental caries. No oropharyngeal exudate, posterior oropharyngeal edema, posterior oropharyngeal erythema or tonsillar abscesses.  Eyes: Pupils are equal, round, and reactive to light.   Neck: No JVD present. No thyromegaly present.   Cardiovascular: Normal rate, regular rhythm and normal heart sounds. Exam reveals no gallop and no friction rub.   Murmur heard.  Pulmonary/Chest: Effort normal and breath sounds normal. No accessory muscle usage or stridor. No apnea and no tachypnea. No respiratory distress, decreased breath sounds, wheezes, rhonchi, rales, or tenderness. Clear breath sounds throughout, on room air, in no acute distress.  Abdominal: Soft. He exhibits no mass. There is no tenderness. No hepatosplenomegaly, hernias and normoactive bowel sounds  Musculoskeletal: Normal range of motion. exhibits no edema.   Neurological:  alert and oriented to person, place, and time. not disoriented.   Skin: Skin is warm and dry. Capillary refill takes less 2 sec. No cyanosis or erythema. No pallor. Nails show no clubbing.   Psychiatric: normal mood and affect. behavior is normal. Judgment and thought content normal.       Radiographs (ct chest and cxr) reviewed: view by direct vision   Patient imaging studies were reviewed and interpreted independently. My personal interpretation of most recent Chest Xray and CT Chest includes:    X-Ray Chest AP Portable 07/15/2024 lungs clear    CXR - 6/27/2023 - no acute process noted    X-Ray Chest 1 View 10/10/2022 FINDINGS:  The heart is mildly enlarged.  The lungs are well expanded without consolidation or pleural effusion.   Impression:   Cardiomegaly.      Patient's labs were reviewed including CBC and CMP    Lab Results   Component Value Date    WBC 6.87 07/19/2024    RBC 2.83 (L) 07/19/2024    HGB 9.2  (L) 07/19/2024    HCT 28.2 (L) 07/19/2024     (H) 07/19/2024    MCH 32.5 (H) 07/19/2024    MCHC 32.6 07/19/2024    RDW 15.6 (H) 07/19/2024     07/19/2024    MPV 10.3 07/19/2024    GRAN 3.8 07/19/2024    GRAN 55.8 07/19/2024    LYMPH 1.7 07/19/2024    LYMPH 24.5 07/19/2024    MONO 1.0 07/19/2024    MONO 14.1 07/19/2024    EOS 0.2 07/19/2024    BASO 0.03 07/19/2024    EOSINOPHIL 3.3 07/19/2024    BASOPHIL 0.4 07/19/2024   CMP  Sodium   Date Value Ref Range Status   07/19/2024 137 136 - 145 mmol/L Final     Potassium   Date Value Ref Range Status   07/19/2024 4.6 3.5 - 5.1 mmol/L Final     Chloride   Date Value Ref Range Status   07/19/2024 99 95 - 110 mmol/L Final     CO2   Date Value Ref Range Status   07/19/2024 24 23 - 29 mmol/L Final     Glucose   Date Value Ref Range Status   07/19/2024 90 70 - 110 mg/dL Final     BUN   Date Value Ref Range Status   07/19/2024 39 (H) 6 - 20 mg/dL Final     Creatinine   Date Value Ref Range Status   07/19/2024 15.1 (H) 0.5 - 1.4 mg/dL Final     Calcium   Date Value Ref Range Status   07/19/2024 8.6 (L) 8.7 - 10.5 mg/dL Final     Total Protein   Date Value Ref Range Status   07/19/2024 7.3 6.0 - 8.4 g/dL Final     Albumin   Date Value Ref Range Status   07/19/2024 3.4 (L) 3.5 - 5.2 g/dL Final     Total Bilirubin   Date Value Ref Range Status   07/19/2024 0.3 0.1 - 1.0 mg/dL Final     Comment:     For infants and newborns, interpretation of results should be based  on gestational age, weight and in agreement with clinical  observations.    Premature Infant recommended reference ranges:  Up to 24 hours.............<8.0 mg/dL  Up to 48 hours............<12.0 mg/dL  3-5 days..................<15.0 mg/dL  6-29 days.................<15.0 mg/dL       Alkaline Phosphatase   Date Value Ref Range Status   07/19/2024 58 55 - 135 U/L Final     AST   Date Value Ref Range Status   07/19/2024 21 10 - 40 U/L Final     ALT   Date Value Ref Range Status   07/19/2024 24 10 - 44 U/L  Final     Anion Gap   Date Value Ref Range Status   07/19/2024 14 8 - 16 mmol/L Final     eGFR   Date Value Ref Range Status   07/19/2024 3.7 (A) >60 mL/min/1.73 m^2 Final         PFT was not done  Pulmonary Functions Testing Results:      Transthoracic echo (TTE) complete Summary 10/11/2022  The estimated PA systolic pressure is 48 mmHg.  The left ventricle is normal in size with mild concentric hypertrophy and normal systolic function.  Grade III left ventricular diastolic dysfunction.  Moderate right ventricular enlargement with mildly to moderately reduced right ventricular systolic function.  Severe left atrial enlargement.  There is moderate pulmonary hypertension.  Intermediate central venous pressure (8 mmHg).  Mild-to-moderate mitral regurgitation.  Moderate to severe tricuspid regurgitation.  Moderate right atrial enlargement.  The estimated ejection fraction is 60%.  Atrial fibrillation not observed.        Plan:  Clinical impression is resonably certain and repeated evaluation prn +/- follow up will be needed as below.    João was seen today for follow-up and apnea.    Diagnoses and all orders for this visit:    Obstructive sleep apnea on CPAP  -     HME - OTHER  -     CPAP/BIPAP SUPPLIES      Body mass index is 37.16 kg/m².       Morbid obesity complicates all aspects of disease management from diagnostic modalities to treatment. Weight loss encouraged and health benefits explained to patient. Nutritional counseling and physical activity encouraged.       Follow up in about 6 months (around 2/28/2025), or if symptoms worsen or fail to improve.    Discussed with patient above for education the following:      Patient Instructions   Will change face mask, if dry mouth continues will change BIPAP setting to a lower pressure window.

## 2024-08-29 NOTE — PATIENT INSTRUCTIONS
Will change face mask, if dry mouth continues will change BIPAP setting to a lower pressure window.

## 2024-09-02 PROCEDURE — 86832 HLA CLASS I HIGH DEFIN QUAL: CPT | Mod: TXP | Performed by: NURSE PRACTITIONER

## 2024-09-02 PROCEDURE — 86833 HLA CLASS II HIGH DEFIN QUAL: CPT | Mod: TXP | Performed by: NURSE PRACTITIONER

## 2024-09-05 ENCOUNTER — LAB VISIT (OUTPATIENT)
Dept: LAB | Facility: HOSPITAL | Age: 44
End: 2024-09-05
Payer: COMMERCIAL

## 2024-09-05 DIAGNOSIS — Z76.82 ORGAN TRANSPLANT CANDIDATE: ICD-10-CM

## 2024-09-07 NOTE — TELEPHONE ENCOUNTER
No care due was identified.  [facility] Embedded Care Due Messages. Reference number: [id number].   9/07/2024 8:47:33 AM CDT

## 2024-09-09 RX ORDER — OLMESARTAN MEDOXOMIL 40 MG/1
TABLET ORAL
Qty: 90 TABLET | Refills: 0 | Status: SHIPPED | OUTPATIENT
Start: 2024-09-09

## 2024-09-13 LAB — HPRA INTERPRETATION: NORMAL

## 2024-09-17 ENCOUNTER — ANESTHESIA EVENT (OUTPATIENT)
Dept: SURGERY | Facility: HOSPITAL | Age: 44
End: 2024-09-17
Payer: COMMERCIAL

## 2024-09-17 ENCOUNTER — HOSPITAL ENCOUNTER (INPATIENT)
Facility: HOSPITAL | Age: 44
LOS: 19 days | Discharge: NURSING, REHAB OR LONG-TERM CARE | DRG: 853 | End: 2024-10-06
Attending: STUDENT IN AN ORGANIZED HEALTH CARE EDUCATION/TRAINING PROGRAM | Admitting: INTERNAL MEDICINE
Payer: COMMERCIAL

## 2024-09-17 ENCOUNTER — ANESTHESIA (OUTPATIENT)
Dept: SURGERY | Facility: HOSPITAL | Age: 44
End: 2024-09-17
Payer: COMMERCIAL

## 2024-09-17 DIAGNOSIS — T82.590A MALFUNCTION OF ARTERIOVENOUS DIALYSIS FISTULA, INITIAL ENCOUNTER: ICD-10-CM

## 2024-09-17 DIAGNOSIS — Z93.3 STATUS POST COLOSTOMY: ICD-10-CM

## 2024-09-17 DIAGNOSIS — R07.9 CHEST PAIN: ICD-10-CM

## 2024-09-17 DIAGNOSIS — E55.9 VITAMIN D DEFICIENCY: ICD-10-CM

## 2024-09-17 DIAGNOSIS — K63.1 PERFORATION OF INTESTINE: ICD-10-CM

## 2024-09-17 DIAGNOSIS — K40.40: ICD-10-CM

## 2024-09-17 DIAGNOSIS — R78.81 BACTEREMIA: ICD-10-CM

## 2024-09-17 DIAGNOSIS — N50.89 SCROTAL EDEMA: Primary | ICD-10-CM

## 2024-09-17 DIAGNOSIS — M79.89 NECROTIZING SOFT TISSUE INFECTION: ICD-10-CM

## 2024-09-17 DIAGNOSIS — N18.6 ESRD (END STAGE RENAL DISEASE): ICD-10-CM

## 2024-09-17 LAB
ABO GROUP BLD: NORMAL
ALBUMIN SERPL BCP-MCNC: 3.2 G/DL (ref 3.5–5.2)
ALLENS TEST: NORMAL
ALP SERPL-CCNC: 72 U/L (ref 55–135)
ALT SERPL W/O P-5'-P-CCNC: 10 U/L (ref 10–44)
ANION GAP SERPL CALC-SCNC: 22 MMOL/L (ref 8–16)
AST SERPL-CCNC: 14 U/L (ref 10–40)
BASOPHILS # BLD AUTO: 0.03 K/UL (ref 0–0.2)
BASOPHILS NFR BLD: 0.2 % (ref 0–1.9)
BILIRUB SERPL-MCNC: 1.2 MG/DL (ref 0.1–1)
BLD GP AB SCN CELLS X3 SERPL QL: NORMAL
BNP SERPL-MCNC: 64 PG/ML (ref 0–99)
BUN SERPL-MCNC: 36 MG/DL (ref 6–20)
CALCIUM SERPL-MCNC: 9.2 MG/DL (ref 8.7–10.5)
CHLORIDE SERPL-SCNC: 92 MMOL/L (ref 95–110)
CO2 SERPL-SCNC: 27 MMOL/L (ref 23–29)
CREAT SERPL-MCNC: 14.5 MG/DL (ref 0.5–1.4)
DELSYS: NORMAL
DIFFERENTIAL METHOD BLD: ABNORMAL
EOSINOPHIL # BLD AUTO: 0 K/UL (ref 0–0.5)
EOSINOPHIL NFR BLD: 0 % (ref 0–8)
ERYTHROCYTE [DISTWIDTH] IN BLOOD BY AUTOMATED COUNT: 15.9 % (ref 11.5–14.5)
EST. GFR  (NO RACE VARIABLE): 4 ML/MIN/1.73 M^2
GLUCOSE SERPL-MCNC: 113 MG/DL (ref 70–110)
HCT VFR BLD AUTO: 27.2 % (ref 40–54)
HGB BLD-MCNC: 8.7 G/DL (ref 14–18)
IMM GRANULOCYTES # BLD AUTO: 0.25 K/UL (ref 0–0.04)
IMM GRANULOCYTES NFR BLD AUTO: 1.7 % (ref 0–0.5)
LACTATE SERPL-SCNC: 1.1 MMOL/L (ref 0.5–2.2)
LDH SERPL L TO P-CCNC: 1.69 MMOL/L (ref 0.5–2.2)
LYMPHOCYTES # BLD AUTO: 0.9 K/UL (ref 1–4.8)
LYMPHOCYTES NFR BLD: 6 % (ref 18–48)
MAGNESIUM SERPL-MCNC: 1.7 MG/DL (ref 1.6–2.6)
MCH RBC QN AUTO: 33 PG (ref 27–31)
MCHC RBC AUTO-ENTMCNC: 32 G/DL (ref 32–36)
MCV RBC AUTO: 103 FL (ref 82–98)
MODE: NORMAL
MONOCYTES # BLD AUTO: 1.8 K/UL (ref 0.3–1)
MONOCYTES NFR BLD: 12 % (ref 4–15)
NEUTROPHILS # BLD AUTO: 11.7 K/UL (ref 1.8–7.7)
NEUTROPHILS NFR BLD: 80.1 % (ref 38–73)
NRBC BLD-RTO: 0 /100 WBC
PLATELET # BLD AUTO: 319 K/UL (ref 150–450)
PMV BLD AUTO: 9.6 FL (ref 9.2–12.9)
POTASSIUM SERPL-SCNC: 4.4 MMOL/L (ref 3.5–5.1)
PROT SERPL-MCNC: 8.6 G/DL (ref 6–8.4)
RBC # BLD AUTO: 2.64 M/UL (ref 4.6–6.2)
RH BLD: NORMAL
SAMPLE: NORMAL
SITE: NORMAL
SODIUM SERPL-SCNC: 141 MMOL/L (ref 136–145)
SPECIMEN OUTDATE: NORMAL
TROPONIN I SERPL DL<=0.01 NG/ML-MCNC: 0.05 NG/ML (ref 0–0.03)
WBC # BLD AUTO: 14.64 K/UL (ref 3.9–12.7)

## 2024-09-17 PROCEDURE — 71000033 HC RECOVERY, INTIAL HOUR: Performed by: SURGERY

## 2024-09-17 PROCEDURE — 0WPF4JZ REMOVAL OF SYNTHETIC SUBSTITUTE FROM ABDOMINAL WALL, PERCUTANEOUS ENDOSCOPIC APPROACH: ICD-10-PCS | Performed by: SURGERY

## 2024-09-17 PROCEDURE — 99900035 HC TECH TIME PER 15 MIN (STAT)

## 2024-09-17 PROCEDURE — 4A1BXSH MONITORING OF GASTROINTESTINAL VASCULAR PERFUSION USING INDOCYANINE GREEN DYE, EXTERNAL APPROACH: ICD-10-PCS | Performed by: SURGERY

## 2024-09-17 PROCEDURE — 44204 LAPARO PARTIAL COLECTOMY: CPT | Mod: ,,, | Performed by: SURGERY

## 2024-09-17 PROCEDURE — 99285 EMERGENCY DEPT VISIT HI MDM: CPT | Mod: 25

## 2024-09-17 PROCEDURE — 37000008 HC ANESTHESIA 1ST 15 MINUTES: Performed by: SURGERY

## 2024-09-17 PROCEDURE — 83735 ASSAY OF MAGNESIUM: CPT | Performed by: STUDENT IN AN ORGANIZED HEALTH CARE EDUCATION/TRAINING PROGRAM

## 2024-09-17 PROCEDURE — 99223 1ST HOSP IP/OBS HIGH 75: CPT | Mod: 57,,, | Performed by: SURGERY

## 2024-09-17 PROCEDURE — 93010 ELECTROCARDIOGRAM REPORT: CPT | Mod: ,,, | Performed by: INTERNAL MEDICINE

## 2024-09-17 PROCEDURE — 36000713 HC OR TIME LEV V EA ADD 15 MIN: Performed by: SURGERY

## 2024-09-17 PROCEDURE — 96365 THER/PROPH/DIAG IV INF INIT: CPT | Mod: 59

## 2024-09-17 PROCEDURE — 37000009 HC ANESTHESIA EA ADD 15 MINS: Performed by: SURGERY

## 2024-09-17 PROCEDURE — 0Y960ZZ DRAINAGE OF LEFT INGUINAL REGION, OPEN APPROACH: ICD-10-PCS | Performed by: SURGERY

## 2024-09-17 PROCEDURE — 71000039 HC RECOVERY, EACH ADD'L HOUR: Performed by: SURGERY

## 2024-09-17 PROCEDURE — 25000003 PHARM REV CODE 250: Performed by: SURGERY

## 2024-09-17 PROCEDURE — 63600175 PHARM REV CODE 636 W HCPCS: Performed by: NURSE ANESTHETIST, CERTIFIED REGISTERED

## 2024-09-17 PROCEDURE — C9290 INJ, BUPIVACAINE LIPOSOME: HCPCS | Performed by: SURGERY

## 2024-09-17 PROCEDURE — 63600175 PHARM REV CODE 636 W HCPCS: Performed by: SURGERY

## 2024-09-17 PROCEDURE — 80053 COMPREHEN METABOLIC PANEL: CPT | Performed by: STUDENT IN AN ORGANIZED HEALTH CARE EDUCATION/TRAINING PROGRAM

## 2024-09-17 PROCEDURE — 83880 ASSAY OF NATRIURETIC PEPTIDE: CPT | Performed by: STUDENT IN AN ORGANIZED HEALTH CARE EDUCATION/TRAINING PROGRAM

## 2024-09-17 PROCEDURE — 85025 COMPLETE CBC W/AUTO DIFF WBC: CPT | Performed by: STUDENT IN AN ORGANIZED HEALTH CARE EDUCATION/TRAINING PROGRAM

## 2024-09-17 PROCEDURE — 36415 COLL VENOUS BLD VENIPUNCTURE: CPT | Performed by: ANESTHESIOLOGY

## 2024-09-17 PROCEDURE — 93005 ELECTROCARDIOGRAM TRACING: CPT

## 2024-09-17 PROCEDURE — 25000003 PHARM REV CODE 250: Performed by: STUDENT IN AN ORGANIZED HEALTH CARE EDUCATION/TRAINING PROGRAM

## 2024-09-17 PROCEDURE — 94660 CPAP INITIATION&MGMT: CPT

## 2024-09-17 PROCEDURE — 99900031 HC PATIENT EDUCATION (STAT)

## 2024-09-17 PROCEDURE — 63600175 PHARM REV CODE 636 W HCPCS: Performed by: STUDENT IN AN ORGANIZED HEALTH CARE EDUCATION/TRAINING PROGRAM

## 2024-09-17 PROCEDURE — 83605 ASSAY OF LACTIC ACID: CPT | Performed by: STUDENT IN AN ORGANIZED HEALTH CARE EDUCATION/TRAINING PROGRAM

## 2024-09-17 PROCEDURE — 0DBN4ZZ EXCISION OF SIGMOID COLON, PERCUTANEOUS ENDOSCOPIC APPROACH: ICD-10-PCS | Performed by: SURGERY

## 2024-09-17 PROCEDURE — 25000003 PHARM REV CODE 250: Performed by: ANESTHESIOLOGY

## 2024-09-17 PROCEDURE — 86850 RBC ANTIBODY SCREEN: CPT | Performed by: ANESTHESIOLOGY

## 2024-09-17 PROCEDURE — 36415 COLL VENOUS BLD VENIPUNCTURE: CPT | Performed by: STUDENT IN AN ORGANIZED HEALTH CARE EDUCATION/TRAINING PROGRAM

## 2024-09-17 PROCEDURE — 94760 N-INVAS EAR/PLS OXIMETRY 1: CPT

## 2024-09-17 PROCEDURE — 27000221 HC OXYGEN, UP TO 24 HOURS

## 2024-09-17 PROCEDURE — 36000712 HC OR TIME LEV V 1ST 15 MIN: Performed by: SURGERY

## 2024-09-17 PROCEDURE — 83605 ASSAY OF LACTIC ACID: CPT

## 2024-09-17 PROCEDURE — 25000003 PHARM REV CODE 250: Performed by: NURSE ANESTHETIST, CERTIFIED REGISTERED

## 2024-09-17 PROCEDURE — 63600175 PHARM REV CODE 636 W HCPCS: Performed by: INTERNAL MEDICINE

## 2024-09-17 PROCEDURE — 84484 ASSAY OF TROPONIN QUANT: CPT | Performed by: STUDENT IN AN ORGANIZED HEALTH CARE EDUCATION/TRAINING PROGRAM

## 2024-09-17 PROCEDURE — 27200651 HC AIRWAY, LMA: Performed by: ANESTHESIOLOGY

## 2024-09-17 PROCEDURE — 94761 N-INVAS EAR/PLS OXIMETRY MLT: CPT

## 2024-09-17 PROCEDURE — 63600175 PHARM REV CODE 636 W HCPCS: Performed by: ANESTHESIOLOGY

## 2024-09-17 PROCEDURE — 87040 BLOOD CULTURE FOR BACTERIA: CPT | Mod: 59 | Performed by: STUDENT IN AN ORGANIZED HEALTH CARE EDUCATION/TRAINING PROGRAM

## 2024-09-17 PROCEDURE — 86900 BLOOD TYPING SEROLOGIC ABO: CPT | Performed by: ANESTHESIOLOGY

## 2024-09-17 PROCEDURE — 86901 BLOOD TYPING SEROLOGIC RH(D): CPT | Performed by: ANESTHESIOLOGY

## 2024-09-17 PROCEDURE — 25000003 PHARM REV CODE 250: Performed by: INTERNAL MEDICINE

## 2024-09-17 PROCEDURE — 11000001 HC ACUTE MED/SURG PRIVATE ROOM

## 2024-09-17 PROCEDURE — 27201423 OPTIME MED/SURG SUP & DEVICES STERILE SUPPLY: Performed by: SURGERY

## 2024-09-17 RX ORDER — CLONIDINE HYDROCHLORIDE 0.1 MG/1
0.3 TABLET ORAL 3 TIMES DAILY
Status: DISCONTINUED | OUTPATIENT
Start: 2024-09-17 | End: 2024-09-25

## 2024-09-17 RX ORDER — FENTANYL CITRATE 50 UG/ML
INJECTION, SOLUTION INTRAMUSCULAR; INTRAVENOUS
Status: DISCONTINUED | OUTPATIENT
Start: 2024-09-17 | End: 2024-09-17

## 2024-09-17 RX ORDER — SUCCINYLCHOLINE CHLORIDE 20 MG/ML
INJECTION INTRAMUSCULAR; INTRAVENOUS
Status: DISCONTINUED | OUTPATIENT
Start: 2024-09-17 | End: 2024-09-17

## 2024-09-17 RX ORDER — IBUPROFEN 600 MG/1
600 TABLET ORAL 4 TIMES DAILY
Status: DISCONTINUED | OUTPATIENT
Start: 2024-09-17 | End: 2024-09-17

## 2024-09-17 RX ORDER — OXYCODONE HYDROCHLORIDE 5 MG/1
5 TABLET ORAL
Status: DISCONTINUED | OUTPATIENT
Start: 2024-09-17 | End: 2024-09-17 | Stop reason: HOSPADM

## 2024-09-17 RX ORDER — NALOXONE HCL 0.4 MG/ML
0.02 VIAL (ML) INJECTION
Status: DISCONTINUED | OUTPATIENT
Start: 2024-09-17 | End: 2024-10-06 | Stop reason: HOSPADM

## 2024-09-17 RX ORDER — BUPIVACAINE HYDROCHLORIDE AND EPINEPHRINE 2.5; 5 MG/ML; UG/ML
INJECTION, SOLUTION EPIDURAL; INFILTRATION; INTRACAUDAL; PERINEURAL
Status: DISCONTINUED | OUTPATIENT
Start: 2024-09-17 | End: 2024-09-17 | Stop reason: HOSPADM

## 2024-09-17 RX ORDER — METOPROLOL SUCCINATE 50 MG/1
150 TABLET, EXTENDED RELEASE ORAL DAILY
Status: DISCONTINUED | OUTPATIENT
Start: 2024-09-18 | End: 2024-09-26

## 2024-09-17 RX ORDER — ONDANSETRON HYDROCHLORIDE 2 MG/ML
INJECTION, SOLUTION INTRAMUSCULAR; INTRAVENOUS
Status: DISCONTINUED | OUTPATIENT
Start: 2024-09-17 | End: 2024-09-17

## 2024-09-17 RX ORDER — DEXTROSE, SODIUM CHLORIDE, SODIUM LACTATE, POTASSIUM CHLORIDE, AND CALCIUM CHLORIDE 5; .6; .31; .03; .02 G/100ML; G/100ML; G/100ML; G/100ML; G/100ML
INJECTION, SOLUTION INTRAVENOUS CONTINUOUS
Status: DISCONTINUED | OUTPATIENT
Start: 2024-09-17 | End: 2024-09-19

## 2024-09-17 RX ORDER — DEXAMETHASONE SODIUM PHOSPHATE 4 MG/ML
INJECTION, SOLUTION INTRA-ARTICULAR; INTRALESIONAL; INTRAMUSCULAR; INTRAVENOUS; SOFT TISSUE
Status: DISCONTINUED | OUTPATIENT
Start: 2024-09-17 | End: 2024-09-17

## 2024-09-17 RX ORDER — TALC
6 POWDER (GRAM) TOPICAL NIGHTLY PRN
Status: DISCONTINUED | OUTPATIENT
Start: 2024-09-17 | End: 2024-10-06 | Stop reason: HOSPADM

## 2024-09-17 RX ORDER — ONDANSETRON HYDROCHLORIDE 2 MG/ML
4 INJECTION, SOLUTION INTRAVENOUS EVERY 6 HOURS PRN
Status: DISCONTINUED | OUTPATIENT
Start: 2024-09-17 | End: 2024-10-06 | Stop reason: HOSPADM

## 2024-09-17 RX ORDER — HYDROCODONE BITARTRATE AND ACETAMINOPHEN 5; 325 MG/1; MG/1
1 TABLET ORAL
Status: COMPLETED | OUTPATIENT
Start: 2024-09-17 | End: 2024-09-17

## 2024-09-17 RX ORDER — CLINDAMYCIN PHOSPHATE 900 MG/50ML
900 INJECTION, SOLUTION INTRAVENOUS ONCE
Status: COMPLETED | OUTPATIENT
Start: 2024-09-17 | End: 2024-09-17

## 2024-09-17 RX ORDER — SEVELAMER CARBONATE 800 MG/1
1600 TABLET, FILM COATED ORAL
Status: DISCONTINUED | OUTPATIENT
Start: 2024-09-18 | End: 2024-09-25

## 2024-09-17 RX ORDER — SODIUM,POTASSIUM PHOSPHATES 280-250MG
2 POWDER IN PACKET (EA) ORAL
Status: DISCONTINUED | OUTPATIENT
Start: 2024-09-17 | End: 2024-10-06 | Stop reason: HOSPADM

## 2024-09-17 RX ORDER — ONDANSETRON HYDROCHLORIDE 2 MG/ML
4 INJECTION, SOLUTION INTRAVENOUS DAILY PRN
Status: DISCONTINUED | OUTPATIENT
Start: 2024-09-17 | End: 2024-09-17 | Stop reason: HOSPADM

## 2024-09-17 RX ORDER — IBUPROFEN 200 MG
24 TABLET ORAL
Status: DISCONTINUED | OUTPATIENT
Start: 2024-09-17 | End: 2024-10-06 | Stop reason: HOSPADM

## 2024-09-17 RX ORDER — INDOCYANINE GREEN AND WATER 25 MG
KIT INJECTION
Status: DISCONTINUED | OUTPATIENT
Start: 2024-09-17 | End: 2024-09-17

## 2024-09-17 RX ORDER — HYDROMORPHONE HYDROCHLORIDE 1 MG/ML
1 INJECTION, SOLUTION INTRAMUSCULAR; INTRAVENOUS; SUBCUTANEOUS EVERY 4 HOURS PRN
Status: DISCONTINUED | OUTPATIENT
Start: 2024-09-17 | End: 2024-09-26

## 2024-09-17 RX ORDER — ACETAMINOPHEN 325 MG/1
650 TABLET ORAL EVERY 4 HOURS PRN
Status: DISCONTINUED | OUTPATIENT
Start: 2024-09-17 | End: 2024-10-06 | Stop reason: HOSPADM

## 2024-09-17 RX ORDER — ACETAMINOPHEN 325 MG/1
650 TABLET ORAL EVERY 8 HOURS PRN
Status: DISCONTINUED | OUTPATIENT
Start: 2024-09-17 | End: 2024-09-27

## 2024-09-17 RX ORDER — SODIUM CHLORIDE 9 MG/ML
INJECTION, SOLUTION INTRAVENOUS CONTINUOUS
Status: DISCONTINUED | OUTPATIENT
Start: 2024-09-17 | End: 2024-09-17

## 2024-09-17 RX ORDER — GABAPENTIN 100 MG/1
200 CAPSULE ORAL 2 TIMES DAILY
Status: DISCONTINUED | OUTPATIENT
Start: 2024-09-17 | End: 2024-10-01

## 2024-09-17 RX ORDER — ISOSORBIDE MONONITRATE 30 MG/1
120 TABLET, EXTENDED RELEASE ORAL DAILY
Status: DISCONTINUED | OUTPATIENT
Start: 2024-09-18 | End: 2024-09-26

## 2024-09-17 RX ORDER — MUPIROCIN 20 MG/G
OINTMENT TOPICAL 2 TIMES DAILY
Status: DISCONTINUED | OUTPATIENT
Start: 2024-09-17 | End: 2024-09-17 | Stop reason: SDUPTHER

## 2024-09-17 RX ORDER — IBUPROFEN 200 MG
16 TABLET ORAL
Status: DISCONTINUED | OUTPATIENT
Start: 2024-09-17 | End: 2024-10-06 | Stop reason: HOSPADM

## 2024-09-17 RX ORDER — CLINDAMYCIN PHOSPHATE 900 MG/50ML
900 INJECTION, SOLUTION INTRAVENOUS
Status: DISCONTINUED | OUTPATIENT
Start: 2024-09-17 | End: 2024-09-22

## 2024-09-17 RX ORDER — HEPARIN SODIUM 5000 [USP'U]/ML
7500 INJECTION, SOLUTION INTRAVENOUS; SUBCUTANEOUS EVERY 8 HOURS
Status: DISCONTINUED | OUTPATIENT
Start: 2024-09-17 | End: 2024-10-01

## 2024-09-17 RX ORDER — ONDANSETRON HYDROCHLORIDE 2 MG/ML
4 INJECTION, SOLUTION INTRAVENOUS EVERY 6 HOURS PRN
Status: DISCONTINUED | OUTPATIENT
Start: 2024-09-17 | End: 2024-09-17 | Stop reason: SDUPTHER

## 2024-09-17 RX ORDER — AMOXICILLIN 250 MG
1 CAPSULE ORAL DAILY
Status: DISCONTINUED | OUTPATIENT
Start: 2024-09-17 | End: 2024-09-25

## 2024-09-17 RX ORDER — HYDROCODONE BITARTRATE AND ACETAMINOPHEN 5; 325 MG/1; MG/1
1 TABLET ORAL EVERY 4 HOURS PRN
Status: DISCONTINUED | OUTPATIENT
Start: 2024-09-17 | End: 2024-09-22

## 2024-09-17 RX ORDER — ONDANSETRON HYDROCHLORIDE 2 MG/ML
4 INJECTION, SOLUTION INTRAVENOUS EVERY 12 HOURS PRN
Status: DISCONTINUED | OUTPATIENT
Start: 2024-09-17 | End: 2024-09-17 | Stop reason: SDUPTHER

## 2024-09-17 RX ORDER — NALOXONE HCL 0.4 MG/ML
0.02 VIAL (ML) INJECTION
Status: DISCONTINUED | OUTPATIENT
Start: 2024-09-17 | End: 2024-09-17 | Stop reason: SDUPTHER

## 2024-09-17 RX ORDER — KETAMINE HCL IN 0.9 % NACL 50 MG/5 ML
SYRINGE (ML) INTRAVENOUS
Status: DISCONTINUED | OUTPATIENT
Start: 2024-09-17 | End: 2024-09-17

## 2024-09-17 RX ORDER — HYDROMORPHONE HYDROCHLORIDE 2 MG/ML
0.2 INJECTION, SOLUTION INTRAMUSCULAR; INTRAVENOUS; SUBCUTANEOUS EVERY 5 MIN PRN
Status: DISCONTINUED | OUTPATIENT
Start: 2024-09-17 | End: 2024-09-17 | Stop reason: HOSPADM

## 2024-09-17 RX ORDER — ACETAMINOPHEN 10 MG/ML
1000 INJECTION, SOLUTION INTRAVENOUS EVERY 8 HOURS
Status: DISPENSED | OUTPATIENT
Start: 2024-09-17 | End: 2024-09-18

## 2024-09-17 RX ORDER — ROCURONIUM BROMIDE 10 MG/ML
INJECTION, SOLUTION INTRAVENOUS
Status: DISCONTINUED | OUTPATIENT
Start: 2024-09-17 | End: 2024-09-17

## 2024-09-17 RX ORDER — BISACODYL 5 MG
5 TABLET, DELAYED RELEASE (ENTERIC COATED) ORAL NIGHTLY
Status: DISCONTINUED | OUTPATIENT
Start: 2024-09-17 | End: 2024-09-25

## 2024-09-17 RX ORDER — LIDOCAINE HYDROCHLORIDE 20 MG/ML
INJECTION INTRAVENOUS
Status: DISCONTINUED | OUTPATIENT
Start: 2024-09-17 | End: 2024-09-17

## 2024-09-17 RX ORDER — PROPOFOL 10 MG/ML
VIAL (ML) INTRAVENOUS
Status: DISCONTINUED | OUTPATIENT
Start: 2024-09-17 | End: 2024-09-17

## 2024-09-17 RX ORDER — LANOLIN ALCOHOL/MO/W.PET/CERES
800 CREAM (GRAM) TOPICAL
Status: DISCONTINUED | OUTPATIENT
Start: 2024-09-17 | End: 2024-10-06 | Stop reason: HOSPADM

## 2024-09-17 RX ORDER — HYDROCODONE BITARTRATE AND ACETAMINOPHEN 5; 325 MG/1; MG/1
1 TABLET ORAL EVERY 6 HOURS PRN
Status: DISCONTINUED | OUTPATIENT
Start: 2024-09-17 | End: 2024-09-17

## 2024-09-17 RX ORDER — GLUCAGON 1 MG
1 KIT INJECTION
Status: DISCONTINUED | OUTPATIENT
Start: 2024-09-17 | End: 2024-10-06 | Stop reason: HOSPADM

## 2024-09-17 RX ORDER — ALUMINUM HYDROXIDE, MAGNESIUM HYDROXIDE, AND SIMETHICONE 1200; 120; 1200 MG/30ML; MG/30ML; MG/30ML
30 SUSPENSION ORAL 4 TIMES DAILY PRN
Status: DISCONTINUED | OUTPATIENT
Start: 2024-09-17 | End: 2024-10-06 | Stop reason: HOSPADM

## 2024-09-17 RX ORDER — VALSARTAN 80 MG/1
320 TABLET ORAL DAILY
Status: DISCONTINUED | OUTPATIENT
Start: 2024-09-18 | End: 2024-09-20

## 2024-09-17 RX ORDER — SODIUM CHLORIDE 0.9 % (FLUSH) 0.9 %
3 SYRINGE (ML) INJECTION EVERY 12 HOURS PRN
Status: DISCONTINUED | OUTPATIENT
Start: 2024-09-17 | End: 2024-10-06 | Stop reason: HOSPADM

## 2024-09-17 RX ORDER — MUPIROCIN 20 MG/G
OINTMENT TOPICAL 2 TIMES DAILY
Status: DISPENSED | OUTPATIENT
Start: 2024-09-17 | End: 2024-09-22

## 2024-09-17 RX ORDER — MIDAZOLAM HYDROCHLORIDE 1 MG/ML
INJECTION INTRAMUSCULAR; INTRAVENOUS
Status: DISCONTINUED | OUTPATIENT
Start: 2024-09-17 | End: 2024-09-17

## 2024-09-17 RX ORDER — METOCLOPRAMIDE HYDROCHLORIDE 5 MG/ML
10 INJECTION INTRAMUSCULAR; INTRAVENOUS EVERY 10 MIN PRN
Status: DISCONTINUED | OUTPATIENT
Start: 2024-09-17 | End: 2024-09-17 | Stop reason: HOSPADM

## 2024-09-17 RX ORDER — INSULIN ASPART 100 [IU]/ML
0-5 INJECTION, SOLUTION INTRAVENOUS; SUBCUTANEOUS
Status: DISCONTINUED | OUTPATIENT
Start: 2024-09-17 | End: 2024-10-06 | Stop reason: HOSPADM

## 2024-09-17 RX ORDER — HYDRALAZINE HYDROCHLORIDE 25 MG/1
100 TABLET, FILM COATED ORAL 3 TIMES DAILY
Status: DISCONTINUED | OUTPATIENT
Start: 2024-09-17 | End: 2024-09-25

## 2024-09-17 RX ADMIN — MIDAZOLAM HYDROCHLORIDE 2 MG: 1 INJECTION, SOLUTION INTRAMUSCULAR; INTRAVENOUS at 01:09

## 2024-09-17 RX ADMIN — SODIUM CHLORIDE, POTASSIUM CHLORIDE, SODIUM LACTATE AND CALCIUM CHLORIDE 500 ML: 600; 310; 30; 20 INJECTION, SOLUTION INTRAVENOUS at 10:09

## 2024-09-17 RX ADMIN — GABAPENTIN 200 MG: 100 CAPSULE ORAL at 09:09

## 2024-09-17 RX ADMIN — FENTANYL CITRATE 100 MCG: 50 INJECTION, SOLUTION INTRAMUSCULAR; INTRAVENOUS at 01:09

## 2024-09-17 RX ADMIN — PHENYLEPHRINE HYDROCHLORIDE 0.3 MCG/KG/MIN: 10 INJECTION INTRAVENOUS at 02:09

## 2024-09-17 RX ADMIN — CLONIDINE HYDROCHLORIDE 0.3 MG: 0.1 TABLET ORAL at 09:09

## 2024-09-17 RX ADMIN — ONDANSETRON 4 MG: 2 INJECTION INTRAMUSCULAR; INTRAVENOUS at 02:09

## 2024-09-17 RX ADMIN — ONDANSETRON 4 MG: 2 INJECTION INTRAMUSCULAR; INTRAVENOUS at 10:09

## 2024-09-17 RX ADMIN — ROCURONIUM BROMIDE 20 MG: 10 INJECTION, SOLUTION INTRAVENOUS at 04:09

## 2024-09-17 RX ADMIN — BISACODYL 5 MG: 5 TABLET, COATED ORAL at 09:09

## 2024-09-17 RX ADMIN — CLINDAMYCIN IN 5 PERCENT DEXTROSE 900 MG: 18 INJECTION, SOLUTION INTRAVENOUS at 07:09

## 2024-09-17 RX ADMIN — LIDOCAINE HYDROCHLORIDE 100 MG: 20 INJECTION, SOLUTION INTRAVENOUS at 01:09

## 2024-09-17 RX ADMIN — HYDROCODONE BITARTRATE AND ACETAMINOPHEN 1 TABLET: 5; 325 TABLET ORAL at 09:09

## 2024-09-17 RX ADMIN — FENTANYL CITRATE 50 MCG: 50 INJECTION, SOLUTION INTRAMUSCULAR; INTRAVENOUS at 05:09

## 2024-09-17 RX ADMIN — CEFEPIME 2 G: 2 INJECTION, POWDER, FOR SOLUTION INTRAVENOUS at 10:09

## 2024-09-17 RX ADMIN — INDOCYANINE GREEN 7.5 MG: KIT INTRAVENOUS at 03:09

## 2024-09-17 RX ADMIN — HYDRALAZINE HYDROCHLORIDE 100 MG: 25 TABLET ORAL at 09:09

## 2024-09-17 RX ADMIN — Medication 10 MG: at 02:09

## 2024-09-17 RX ADMIN — ROCURONIUM BROMIDE 30 MG: 10 INJECTION, SOLUTION INTRAVENOUS at 03:09

## 2024-09-17 RX ADMIN — VANCOMYCIN HYDROCHLORIDE 750 MG: 750 INJECTION, POWDER, LYOPHILIZED, FOR SOLUTION INTRAVENOUS at 02:09

## 2024-09-17 RX ADMIN — HYDROMORPHONE HYDROCHLORIDE 0.2 MG: 2 INJECTION INTRAMUSCULAR; INTRAVENOUS; SUBCUTANEOUS at 06:09

## 2024-09-17 RX ADMIN — SODIUM CHLORIDE, SODIUM LACTATE, POTASSIUM CHLORIDE, CALCIUM CHLORIDE AND DEXTROSE MONOHYDRATE: 5; 600; 310; 30; 20 INJECTION, SOLUTION INTRAVENOUS at 07:09

## 2024-09-17 RX ADMIN — SUGAMMADEX 200 MG: 100 INJECTION, SOLUTION INTRAVENOUS at 05:09

## 2024-09-17 RX ADMIN — CLINDAMYCIN PHOSPHATE 900 MG: 900 INJECTION, SOLUTION INTRAVENOUS at 10:09

## 2024-09-17 RX ADMIN — DEXAMETHASONE SODIUM PHOSPHATE 4 MG: 4 INJECTION, SOLUTION INTRA-ARTICULAR; INTRALESIONAL; INTRAMUSCULAR; INTRAVENOUS; SOFT TISSUE at 02:09

## 2024-09-17 RX ADMIN — ROCURONIUM BROMIDE 45 MG: 10 INJECTION, SOLUTION INTRAVENOUS at 02:09

## 2024-09-17 RX ADMIN — PROPOFOL 160 MG: 10 INJECTION, EMULSION INTRAVENOUS at 01:09

## 2024-09-17 RX ADMIN — SODIUM CHLORIDE, SODIUM GLUCONATE, SODIUM ACETATE, POTASSIUM CHLORIDE, MAGNESIUM CHLORIDE, SODIUM PHOSPHATE, DIBASIC, AND POTASSIUM PHOSPHATE: .53; .5; .37; .037; .03; .012; .00082 INJECTION, SOLUTION INTRAVENOUS at 05:09

## 2024-09-17 RX ADMIN — MUPIROCIN 1 G: 20 OINTMENT TOPICAL at 09:09

## 2024-09-17 RX ADMIN — ROCURONIUM BROMIDE 5 MG: 10 INJECTION, SOLUTION INTRAVENOUS at 01:09

## 2024-09-17 RX ADMIN — HYDROCODONE BITARTRATE AND ACETAMINOPHEN 1 TABLET: 5; 325 TABLET ORAL at 07:09

## 2024-09-17 RX ADMIN — SODIUM CHLORIDE: 9 INJECTION, SOLUTION INTRAVENOUS at 12:09

## 2024-09-17 RX ADMIN — SUCCINYLCHOLINE CHLORIDE 160 MG: 20 INJECTION, SOLUTION INTRAMUSCULAR; INTRAVENOUS at 01:09

## 2024-09-17 RX ADMIN — SODIUM CHLORIDE, SODIUM GLUCONATE, SODIUM ACETATE, POTASSIUM CHLORIDE, MAGNESIUM CHLORIDE, SODIUM PHOSPHATE, DIBASIC, AND POTASSIUM PHOSPHATE: .53; .5; .37; .037; .03; .012; .00082 INJECTION, SOLUTION INTRAVENOUS at 01:09

## 2024-09-17 RX ADMIN — HEPARIN SODIUM 7500 UNITS: 5000 INJECTION INTRAVENOUS; SUBCUTANEOUS at 10:09

## 2024-09-17 RX ADMIN — FENTANYL CITRATE 50 MCG: 50 INJECTION, SOLUTION INTRAMUSCULAR; INTRAVENOUS at 04:09

## 2024-09-17 NOTE — ASSESSMENT & PLAN NOTE
Lengthy discussion with the patient multiple radiologist.  Findings are concerning for gangrenous gas-forming organism within the left inguinal canal versus possible incarcerated hernia containing necrotic fat versus even potential of fistula from the colon.  Regardless this needs to be surgically evaluated.  Did discuss with the patient importance of evaluating the colon to ensure no evidence of colonic injury given proximity of the sigmoid colon to this area.  Assuming no evidence to suggest colonic injury would proceed with surgical debridement of the inguinal canal.  Likely would not be able to repair of the hernia at this point.  If there is colonic injury would be able to address this robotically or possibly with the open surgery.  Lengthy discussion with the patient regarding risks including but not limited to bleeding, infection, bowel injury, abscess need for colonic or small-bowel resection and open wound were discussed at length.  Informed consent has been obtained.  Will proceed with surgery today.

## 2024-09-17 NOTE — CONSULTS
[facility]  General Surgery  Consult Note    Patient Name: João Ham  MRN: [id number]  Code Status: Full Code  Admission Date: 9/17/2024  Hospital Length of Stay: 0 days  Attending Physician: Jhon Quinn MD  Primary Care Provider: Dawson Granado MD    Patient information was obtained from patient and ER records.     Inpatient consult to General surgery  Consult performed by: Galileo Connors MD  Consult ordered by: Cezar Hurst MD        Subjective:     Principal Problem: Inguinal hernia of left side with gangrene    History of Present Illness: This is a pleasant 44-year-old gentleman who presented to the hospital secondary to pain in the abdomen into the scrotal area.  Patient notes that he began having pain on Friday.  States the pain was mostly in the suprapubic and scrotal region.  Pain continued to progress and intensify over the last several days.  Pain the patient also endorses left lower quadrant abdominal pain.  On presentation to the ER he had a CT scan which demonstrated acute inflammatory process infectious process with air in the left inguinal canal extending from a possible left incarcerated inguinal hernia.  There was no obvious bowel incarcerated within the hernia.  Patient does continue to endorse abdominal pain as well as pain along the inguinal canal.  He was a past medical history significant for diabetes, end-stage renal disease and obesity.  No significant abdominal surgical history.    No current facility-administered medications on file prior to encounter.     Current Outpatient Medications on File Prior to Encounter   Medication Sig    apixaban (ELIQUIS) 2.5 mg Tab Take 2.5 mg by mouth 2 (two) times daily.    calcitRIOL (ROCALTROL) 0.25 MCG Cap Take 1 capsule by mouth once daily (Patient taking differently: Take 0.25 mcg by mouth once daily.)    cholecalciferol, vitamin D3, 125 mcg (5,000 unit) Tab Take 1 tablet by mouth once daily.     cloNIDine (CATAPRES) 0.3 MG tablet TAKE 1 TABLET BY MOUTH THREE TIMES DAILY    isosorbide mononitrate (IMDUR) 120 MG 24 hr tablet Take 120 mg by mouth once daily.    metoprolol succinate (TOPROL-XL) 50 MG 24 hr tablet Take 150 mg by mouth once daily.    olmesartan (BENICAR) 40 MG tablet Take 1 tablet by mouth once daily    sevelamer carbonate (RENVELA) 800 mg Tab Take 2 tablets (1,600 mg total) by mouth 3 (three) times daily with meals.    calcium carbonate (TUMS) 200 mg calcium (500 mg) chewable tablet Take 2 tablets (1,000 mg total) by mouth 3 (three) times daily with meals. (Patient taking differently: Take 1,000 mg by mouth 3 (three) times daily.)    hydrALAZINE (APRESOLINE) 100 MG tablet Take 1 tablet (100 mg total) by mouth 3 (three) times daily.       Review of patient's allergies indicates:   Allergen Reactions    Mushroom Swelling     Tongue swells    Tongue swells       Tongue swells       Past Medical History:   Diagnosis Date    Allergy     Anemia     Anxiety     CHF (congestive heart failure)     Depression     High blood pressure with chronic kidney disease, stage 5 chronic kidney disease or end stage renal disease     Hypertension      Past Surgical History:   Procedure Laterality Date    FISTULOGRAM Bilateral 4/30/2024    Procedure: Fistulogram;  Surgeon: Khoobehi, Ali, MD;  Location: [facility] CATH/EP LAB;  Service: Vascular;  Laterality: Bilateral;    HERNIA REPAIR Right     With mesh    INSERTION, CATHETER, TUNNELED N/A 6/22/2023    Procedure: Insertion,catheter,tunneled;  Surgeon: Everett Caicedo MD;  Location: [facility] OR;  Service: General;  Laterality: N/A;    PERCUTANEOUS TRANSLUMINAL ANGIOPLASTY OF ARTERIOVENOUS FISTULA Left 4/30/2024    Procedure: PTA, AV FISTULA;  Surgeon: Khoobehi, Ali, MD;  Location: [facility] CATH/EP LAB;  Service: Vascular;  Laterality: Left;    PHLEBOGRAPHY N/A 7/19/2024    Procedure: Venogram;  Surgeon: Khoobehi, Ali, MD;  Location: [facility] CATH/EP LAB;  Service: Vascular;   Laterality: N/A;    REMOVAL, TUNNELED CATH Right 7/19/2024    Procedure: REMOVAL, TUNNELED CATH;  Surgeon: Khoobehi, Ali, MD;  Location: [facility] CATH/EP LAB;  Service: Vascular;  Laterality: Right;     Family History    None       Tobacco Use    Smoking status: Never     Passive exposure: Never    Smokeless tobacco: Never   Substance and Sexual Activity    Alcohol use: Never    Drug use: Never    Sexual activity: Not Currently     Review of Systems   Constitutional:  Negative for activity change and appetite change.   Cardiovascular:  Negative for chest pain.   Gastrointestinal:  Positive for abdominal pain. Negative for abdominal distention, nausea and vomiting.   Genitourinary:  Positive for scrotal swelling and testicular pain.   Skin:  Negative for color change.   Hematological:  Negative for adenopathy.     Objective:     Vital Signs (Most Recent):  Temp: 99.1 °F (37.3 °C) (09/17/24 1234)  Pulse: 104 (09/17/24 1234)  Resp: 16 (09/17/24 1234)  BP: 100/60 (09/17/24 1234)  SpO2: 99 % (09/17/24 1234) Vital Signs (24h Range):  Temp:  [99 °F (37.2 °C)-99.1 °F (37.3 °C)] 99.1 °F (37.3 °C)  Pulse:  [104-133] 104  Resp:  [16-20] 16  SpO2:  [93 %-99 %] 99 %  BP: ()/(55-67) 100/60     Weight: 128.4 kg (283 lb)  Body mass index is 36.34 kg/m².     Physical Exam  Vitals reviewed.   Constitutional:       Appearance: He is obese.   Cardiovascular:      Rate and Rhythm: Normal rate.      Pulses: Normal pulses.   Pulmonary:      Effort: Pulmonary effort is normal.   Abdominal:      General: Abdomen is flat. There is no distension.      Palpations: There is no mass.      Tenderness: There is abdominal tenderness. There is no guarding or rebound.   Genitourinary:     Comments: There is tenderness in the left testicle.  Induration was noted in the skin over the inguinal canal into the scrotum.  Musculoskeletal:      Cervical back: Normal range of motion.   Skin:     General: Skin is warm.   Neurological:      General: No focal  deficit present.      Mental Status: He is alert.   Psychiatric:         Mood and Affect: Mood normal.            I have reviewed all pertinent lab results within the past 24 hours.  CBC:   Recent Labs   Lab 09/17/24  0851   WBC 14.64*   RBC 2.64*   HGB 8.7*   HCT 27.2*      *   MCH 33.0*   MCHC 32.0     CMP:   Recent Labs   Lab 09/17/24  0851   *   CALCIUM 9.2   ALBUMIN 3.2*   PROT 8.6*      K 4.4   CO2 27   CL 92*   BUN 36*   CREATININE 14.5*   ALKPHOS 72   ALT 10   AST 14   BILITOT 1.2*       Significant Diagnostics:  CT scan of the abdomen pelvis was reviewed significant induration and inflammation in the left inguinal town extending into the scrotum.  There is air within the inguinal canal consistent with gas-forming organism versus perforation from a bowel injury.  A loop of the sigmoid colon does appear to be in close proximity to the hernia site however review with multiple radiologist does not strongly suggest findings of perforated colon.    Assessment/Plan:     * Inguinal hernia of left side with gangrene  Lengthy discussion with the patient multiple radiologist.  Findings are concerning for gangrenous gas-forming organism within the left inguinal canal versus possible incarcerated hernia containing necrotic fat versus even potential of fistula from the colon.  Regardless this needs to be surgically evaluated.  Did discuss with the patient importance of evaluating the colon to ensure no evidence of colonic injury given proximity of the sigmoid colon to this area.  Assuming no evidence to suggest colonic injury would proceed with surgical debridement of the inguinal canal.  Likely would not be able to repair of the hernia at this point.  If there is colonic injury would be able to address this robotically or possibly with the open surgery.  Lengthy discussion with the patient regarding risks including but not limited to bleeding, infection, bowel injury, abscess need for colonic  or small-bowel resection and open wound were discussed at length.  Informed consent has been obtained.  Will proceed with surgery today.      VTE Risk Mitigation (From admission, onward)           Ordered     heparin (porcine) injection 7,500 Units  Every 8 hours         09/17/24 1031     IP VTE HIGH RISK PATIENT  Once         09/17/24 1031     Place sequential compression device  Until discontinued         09/17/24 1031                    Thank you for your consult. I will follow-up with patient. Please contact us if you have any additional questions.    Galileo Connors MD  General Surgery  [facility]

## 2024-09-17 NOTE — ED NOTES
Pt aware a urine specimen is needed states he only makes urine 1x in the AM and has already made urine today. Will notify nurse if or when he is able to void.

## 2024-09-17 NOTE — HPI
This is a pleasant 44-year-old gentleman who presented to the hospital secondary to pain in the abdomen into the scrotal area.  Patient notes that he began having pain on Friday.  States the pain was mostly in the suprapubic and scrotal region.  Pain continued to progress and intensify over the last several days.  Pain the patient also endorses left lower quadrant abdominal pain.  On presentation to the ER he had a CT scan which demonstrated acute inflammatory process infectious process with air in the left inguinal canal extending from a possible left incarcerated inguinal hernia.  There was no obvious bowel incarcerated within the hernia.  Patient does continue to endorse abdominal pain as well as pain along the inguinal canal.  He was a past medical history significant for diabetes, end-stage renal disease and obesity.  No significant abdominal surgical history.

## 2024-09-17 NOTE — SUBJECTIVE & OBJECTIVE
Past Medical History:   Diagnosis Date    Allergy     Anemia     Anxiety     CHF (congestive heart failure)     Depression     High blood pressure with chronic kidney disease, stage 5 chronic kidney disease or end stage renal disease     Hypertension        Past Surgical History:   Procedure Laterality Date    FISTULOGRAM Bilateral 4/30/2024    Procedure: Fistulogram;  Surgeon: Khoobehi, Ali, MD;  Location: [facility] CATH/EP LAB;  Service: Vascular;  Laterality: Bilateral;    HERNIA REPAIR Right     With mesh    INSERTION, CATHETER, TUNNELED N/A 6/22/2023    Procedure: Insertion,catheter,tunneled;  Surgeon: Everett Caicedo MD;  Location: [facility] OR;  Service: General;  Laterality: N/A;    PERCUTANEOUS TRANSLUMINAL ANGIOPLASTY OF ARTERIOVENOUS FISTULA Left 4/30/2024    Procedure: PTA, AV FISTULA;  Surgeon: Khoobehi, Ali, MD;  Location: [facility] CATH/EP LAB;  Service: Vascular;  Laterality: Left;    PHLEBOGRAPHY N/A 7/19/2024    Procedure: Venogram;  Surgeon: Khoobehi, Ali, MD;  Location: [facility] CATH/EP LAB;  Service: Vascular;  Laterality: N/A;    REMOVAL, TUNNELED CATH Right 7/19/2024    Procedure: REMOVAL, TUNNELED CATH;  Surgeon: Khoobehi, Ali, MD;  Location: [facility] CATH/EP LAB;  Service: Vascular;  Laterality: Right;       Review of patient's allergies indicates:   Allergen Reactions    Mushroom Swelling     Tongue swells    Tongue swells       Tongue swells       No current facility-administered medications on file prior to encounter.     Current Outpatient Medications on File Prior to Encounter   Medication Sig    apixaban (ELIQUIS) 2.5 mg Tab Take 2.5 mg by mouth 2 (two) times daily.    calcitRIOL (ROCALTROL) 0.25 MCG Cap Take 1 capsule by mouth once daily (Patient taking differently: Take 0.25 mcg by mouth once daily.)    calcium carbonate (TUMS) 200 mg calcium (500 mg) chewable tablet Take 2 tablets (1,000 mg total) by mouth 3 (three) times daily with meals. (Patient taking differently: Take 1,000 mg by mouth 3 (three)  times daily.)    cholecalciferol, vitamin D3, 125 mcg (5,000 unit) Tab Take 1 tablet by mouth once daily.    cloNIDine (CATAPRES) 0.3 MG tablet TAKE 1 TABLET BY MOUTH THREE TIMES DAILY    hydrALAZINE (APRESOLINE) 100 MG tablet Take 1 tablet (100 mg total) by mouth 3 (three) times daily.    isosorbide mononitrate (IMDUR) 120 MG 24 hr tablet Take 120 mg by mouth once daily.    metoprolol succinate (TOPROL-XL) 50 MG 24 hr tablet Take 150 mg by mouth once daily.    olmesartan (BENICAR) 40 MG tablet Take 1 tablet by mouth once daily    sevelamer carbonate (RENVELA) 800 mg Tab Take 2 tablets (1,600 mg total) by mouth 3 (three) times daily with meals.     Family History    None       Tobacco Use    Smoking status: Never     Passive exposure: Never    Smokeless tobacco: Never   Substance and Sexual Activity    Alcohol use: Never    Drug use: Never    Sexual activity: Not Currently     Review of Systems   Constitutional:  Positive for fatigue and fever. Negative for chills.   HENT:  Negative for congestion, ear pain, sinus pressure and sore throat.    Eyes:  Negative for redness and itching.   Respiratory:  Negative for cough, chest tightness and shortness of breath.    Cardiovascular:  Negative for chest pain, palpitations and leg swelling.   Gastrointestinal:  Positive for nausea and vomiting. Negative for abdominal pain, constipation and diarrhea.   Endocrine: Negative for polydipsia, polyphagia and polyuria.   Genitourinary:  Negative for dysuria, flank pain, frequency, hematuria and urgency.   Musculoskeletal:  Negative for back pain, joint swelling and myalgias.   Skin:  Negative for pallor, rash and wound.   Neurological:  Positive for weakness. Negative for dizziness, syncope, light-headedness, numbness and headaches.   Hematological:  Negative for adenopathy. Does not bruise/bleed easily.   Psychiatric/Behavioral:  Negative for agitation, hallucinations and suicidal ideas. The patient is not nervous/anxious.    All  other systems reviewed and are negative.    Objective:     Vital Signs (Most Recent):  Temp: 99 °F (37.2 °C) (09/17/24 0829)  Pulse: (!) 116 (09/17/24 0935)  Resp: 20 (09/17/24 0928)  BP: (!) 107/56 (09/17/24 0829)  SpO2: 96 % (09/17/24 0935) Vital Signs (24h Range):  Temp:  [99 °F (37.2 °C)] 99 °F (37.2 °C)  Pulse:  [116-133] 116  Resp:  [16-20] 20  SpO2:  [95 %-96 %] 96 %  BP: (107)/(56) 107/56     Weight: 128.4 kg (283 lb)  Body mass index is 36.34 kg/m².     Physical Exam  Vitals and nursing note reviewed.   Constitutional:       General: He is not in acute distress.     Appearance: He is obese. He is not toxic-appearing.   HENT:      Head: Atraumatic.      Mouth/Throat:      Mouth: Mucous membranes are moist.      Pharynx: Oropharynx is clear.   Eyes:      General: No scleral icterus.     Conjunctiva/sclera: Conjunctivae normal.      Pupils: Pupils are equal, round, and reactive to light.   Cardiovascular:      Rate and Rhythm: Regular rhythm. Tachycardia present.      Heart sounds: No murmur heard.  Pulmonary:      Effort: No respiratory distress.      Breath sounds: No wheezing, rhonchi or rales.   Abdominal:      General: Abdomen is flat. Bowel sounds are normal.      Palpations: Abdomen is soft.   Genitourinary:     Comments: There is left groin swelling with mild erythema, mostly nontender.   Musculoskeletal:         General: No swelling or deformity.      Cervical back: No rigidity or tenderness.   Skin:     Coloration: Skin is not jaundiced or pale.      Findings: No bruising, erythema or rash.   Neurological:      General: No focal deficit present.      Mental Status: He is alert and oriented to person, place, and time.      Cranial Nerves: No cranial nerve deficit.      Sensory: No sensory deficit.      Motor: No weakness.   Psychiatric:         Mood and Affect: Mood normal.         Behavior: Behavior normal.              CRANIAL NERVES     CN III, IV, VI   Pupils are equal, round, and reactive to  light.       Significant Labs: All pertinent labs within the past 24 hours have been reviewed.  CBC:   Recent Labs   Lab 09/17/24  0851   WBC 14.64*   HGB 8.7*   HCT 27.2*        CMP:   Recent Labs   Lab 09/17/24  0851      K 4.4   CL 92*   CO2 27   *   BUN 36*   CREATININE 14.5*   CALCIUM 9.2   PROT 8.6*   ALBUMIN 3.2*   BILITOT 1.2*   ALKPHOS 72   AST 14   ALT 10   ANIONGAP 22*       Significant Imaging: I have reviewed all pertinent imaging results/findings within the past 24 hours.

## 2024-09-17 NOTE — BRIEF OP NOTE
[facility]  Brief Operative Note    SUMMARY     Surgery Date: 9/17/2024     Surgeons and Role:     * Galileo Connors MD - Primary    Assisting Surgeon: None    Pre-op Diagnosis:  Inguinal hernia with gangrene [K40.40]    Post-op Diagnosis:    Colonic perforation due to mesh erosion in L groin.      Procedure(s) (LRB):  XI ROBOTIC SIGMOID RESECTION, WITH ANASTAMOSIS (N/A)  DRAINAGE, ABSCESS, GROIN (Left)    Anesthesia: General    Implants:  * No implants in log *    Operative Findings: Colonic erosion into mesh with perforation into the inguinal canal    Estimated Blood Loss: 50 cc's      Estimated Blood Loss has been documented.         Specimens:   Specimen (24h ago, onward)       Start     Ordered    09/17/24 1702  Specimen to Pathology - Surgery  Once        Comments: Pre-op Diagnosis: Inguinal hernia with gangrene [K40.40]Procedure(s):XI ROBOTIC LAPAROSCOPY,DIAGNOSTICEXPLORATION, SCROTUM Number of specimens: 1Name of specimens: 1. colon     Question:  Release to patient  Answer:  Immediate    09/17/24 1702                    [id number]

## 2024-09-17 NOTE — ASSESSMENT & PLAN NOTE
Chronic, controlled. Latest blood pressure and vitals reviewed-     Home meds for hypertension were reviewed and noted below.   Hypertension Medications               cloNIDine (CATAPRES) 0.3 MG tablet TAKE 1 TABLET BY MOUTH THREE TIMES DAILY    hydrALAZINE (APRESOLINE) 100 MG tablet Take 1 tablet (100 mg total) by mouth 3 (three) times daily.    isosorbide mononitrate (IMDUR) 120 MG 24 hr tablet Take 120 mg by mouth once daily.    metoprolol succinate (TOPROL-XL) 50 MG 24 hr tablet Take 150 mg by mouth once daily.    olmesartan (BENICAR) 40 MG tablet Take 1 tablet by mouth once daily            While in the hospital, will manage blood pressure as follows; Continue home antihypertensive regimen    Will utilize p.r.n. blood pressure medication only if patient's blood pressure greater than 140/90 and he develops symptoms such as worsening chest pain or shortness of breath.

## 2024-09-17 NOTE — HPI
Patient is a 44 year old male with history of ESDR on dialysis MWF, awaiting kidney transplant, HTN, presented to ED with 3 days history of left groin pain and swelling. States swelling comes every time he cough and is being painful. Patient has low grade fever 99s at home. He has been vomiting bilious fluid last 2-3 days. No diarrhea or abdominal pain.     In the ED CT abdomen showed Left inguinal hernia containing fat and air as well as marked inflammation extending from inside the pelvis, in the hernia and down into the left scrotum. This is consistent with an infected inguinal hernia, possible gangrene. WBC was 14, patient was tachycardic. General surgery was consulted and patient was admitted under hospitalist.
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
## 2024-09-17 NOTE — ANESTHESIA PROCEDURE NOTES
Intubation    Date/Time: 9/17/2024 1:57 PM    Performed by: Herrera Helton CRNA  Authorized by: Everett Fraire MD    Intubation:     Induction:  Intravenous    Intubated:  Postinduction    Mask Ventilation:  Easy mask    Attempts:  1    Attempted By:  CRNA    Method of Intubation:  Video laryngoscopy    Blade:  Foster 3    Laryngeal View Grade: Grade I - full view of cords      Difficult Airway Encountered?: No      Complications:  None    Airway Device:  Oral endotracheal tube    Airway Device Size:  7.5    Style/Cuff Inflation:  Cuffed (inflated to minimal occlusive pressure)    Inflation Amount (mL):  5    Tube secured:  21    Secured at:  The lips    Placement Verified By:  Capnometry    Complicating Factors:  None    Findings Post-Intubation:  BS equal bilateral and atraumatic/condition of teeth unchanged

## 2024-09-17 NOTE — ANESTHESIA PROCEDURE NOTES
Arterial    Diagnosis: septic    Patient location during procedure: done in OR    Staffing  Authorizing Provider: Everett Fraire MD  Performing Provider: Herrera Helton CRNA    Staffing  Performed by: Herrera Helton CRNA  Authorized by: Everett Fraire MD    Anesthesiologist was present at the time of the procedure.  Arterial  Skin Prep: chlorhexidine gluconate  Local Infiltration: none  Orientation: right  Location: radial    Catheter Size: 20 G  Catheter placement by Anatomical landmarks. Heme positive aspiration all ports. Insertion Attempts: 1  Assessment  Dressing: secured with tape and tegaderm  Patient: Tolerated well

## 2024-09-17 NOTE — SUBJECTIVE & OBJECTIVE
No current facility-administered medications on file prior to encounter.     Current Outpatient Medications on File Prior to Encounter   Medication Sig    apixaban (ELIQUIS) 2.5 mg Tab Take 2.5 mg by mouth 2 (two) times daily.    calcitRIOL (ROCALTROL) 0.25 MCG Cap Take 1 capsule by mouth once daily (Patient taking differently: Take 0.25 mcg by mouth once daily.)    cholecalciferol, vitamin D3, 125 mcg (5,000 unit) Tab Take 1 tablet by mouth once daily.    cloNIDine (CATAPRES) 0.3 MG tablet TAKE 1 TABLET BY MOUTH THREE TIMES DAILY    isosorbide mononitrate (IMDUR) 120 MG 24 hr tablet Take 120 mg by mouth once daily.    metoprolol succinate (TOPROL-XL) 50 MG 24 hr tablet Take 150 mg by mouth once daily.    olmesartan (BENICAR) 40 MG tablet Take 1 tablet by mouth once daily    sevelamer carbonate (RENVELA) 800 mg Tab Take 2 tablets (1,600 mg total) by mouth 3 (three) times daily with meals.    calcium carbonate (TUMS) 200 mg calcium (500 mg) chewable tablet Take 2 tablets (1,000 mg total) by mouth 3 (three) times daily with meals. (Patient taking differently: Take 1,000 mg by mouth 3 (three) times daily.)    hydrALAZINE (APRESOLINE) 100 MG tablet Take 1 tablet (100 mg total) by mouth 3 (three) times daily.       Review of patient's allergies indicates:   Allergen Reactions    Mushroom Swelling     Tongue swells    Tongue swells       Tongue swells       Past Medical History:   Diagnosis Date    Allergy     Anemia     Anxiety     CHF (congestive heart failure)     Depression     High blood pressure with chronic kidney disease, stage 5 chronic kidney disease or end stage renal disease     Hypertension      Past Surgical History:   Procedure Laterality Date    FISTULOGRAM Bilateral 4/30/2024    Procedure: Fistulogram;  Surgeon: Khoobehi, Ali, MD;  Location: [facility] CATH/EP LAB;  Service: Vascular;  Laterality: Bilateral;    HERNIA REPAIR Right     With mesh    INSERTION, CATHETER, TUNNELED N/A 6/22/2023    Procedure:  Insertion,catheter,tunneled;  Surgeon: Everett Caicedo MD;  Location: [facility] OR;  Service: General;  Laterality: N/A;    PERCUTANEOUS TRANSLUMINAL ANGIOPLASTY OF ARTERIOVENOUS FISTULA Left 4/30/2024    Procedure: PTA, AV FISTULA;  Surgeon: Khoobehi, Ali, MD;  Location: [facility] CATH/EP LAB;  Service: Vascular;  Laterality: Left;    PHLEBOGRAPHY N/A 7/19/2024    Procedure: Venogram;  Surgeon: Khoobehi, Ali, MD;  Location: [facility] CATH/EP LAB;  Service: Vascular;  Laterality: N/A;    REMOVAL, TUNNELED CATH Right 7/19/2024    Procedure: REMOVAL, TUNNELED CATH;  Surgeon: Khoobehi, Ali, MD;  Location: [facility] CATH/EP LAB;  Service: Vascular;  Laterality: Right;     Family History    None       Tobacco Use    Smoking status: Never     Passive exposure: Never    Smokeless tobacco: Never   Substance and Sexual Activity    Alcohol use: Never    Drug use: Never    Sexual activity: Not Currently     Review of Systems   Constitutional:  Negative for activity change and appetite change.   Cardiovascular:  Negative for chest pain.   Gastrointestinal:  Positive for abdominal pain. Negative for abdominal distention, nausea and vomiting.   Genitourinary:  Positive for scrotal swelling and testicular pain.   Skin:  Negative for color change.   Hematological:  Negative for adenopathy.     Objective:     Vital Signs (Most Recent):  Temp: 99.1 °F (37.3 °C) (09/17/24 1234)  Pulse: 104 (09/17/24 1234)  Resp: 16 (09/17/24 1234)  BP: 100/60 (09/17/24 1234)  SpO2: 99 % (09/17/24 1234) Vital Signs (24h Range):  Temp:  [99 °F (37.2 °C)-99.1 °F (37.3 °C)] 99.1 °F (37.3 °C)  Pulse:  [104-133] 104  Resp:  [16-20] 16  SpO2:  [93 %-99 %] 99 %  BP: ()/(55-67) 100/60     Weight: 128.4 kg (283 lb)  Body mass index is 36.34 kg/m².     Physical Exam  Vitals reviewed.   Constitutional:       Appearance: He is obese.   Cardiovascular:      Rate and Rhythm: Normal rate.      Pulses: Normal pulses.   Pulmonary:      Effort: Pulmonary effort is normal.    Abdominal:      General: Abdomen is flat. There is no distension.      Palpations: There is no mass.      Tenderness: There is abdominal tenderness. There is no guarding or rebound.   Genitourinary:     Comments: There is tenderness in the left testicle.  Induration was noted in the skin over the inguinal canal into the scrotum.  Musculoskeletal:      Cervical back: Normal range of motion.   Skin:     General: Skin is warm.   Neurological:      General: No focal deficit present.      Mental Status: He is alert.   Psychiatric:         Mood and Affect: Mood normal.            I have reviewed all pertinent lab results within the past 24 hours.  CBC:   Recent Labs   Lab 09/17/24  0851   WBC 14.64*   RBC 2.64*   HGB 8.7*   HCT 27.2*      *   MCH 33.0*   MCHC 32.0     CMP:   Recent Labs   Lab 09/17/24  0851   *   CALCIUM 9.2   ALBUMIN 3.2*   PROT 8.6*      K 4.4   CO2 27   CL 92*   BUN 36*   CREATININE 14.5*   ALKPHOS 72   ALT 10   AST 14   BILITOT 1.2*       Significant Diagnostics:  CT scan of the abdomen pelvis was reviewed significant induration and inflammation in the left inguinal town extending into the scrotum.  There is air within the inguinal canal consistent with gas-forming organism versus perforation from a bowel injury.  A loop of the sigmoid colon does appear to be in close proximity to the hernia site however review with multiple radiologist does not strongly suggest findings of perforated colon.

## 2024-09-17 NOTE — PROGRESS NOTES
"Pharmacokinetic Initial Assessment: IV Vancomycin    Assessment/Plan:    Initiate intravenous vancomycin with loading dose of 750 mg once   Desired empiric serum trough concentration is 15 to 20 mcg/mL  Draw vancomycin random level on 9/18 at 1100.  Pharmacy will continue to follow and monitor vancomycin.      Please contact pharmacy at extension [phone] with any questions regarding this assessment.     Thank you for the consult,   Maude Mcgill       Patient brief summary:  João Ham is a 44 y.o. male initiated on antimicrobial therapy with IV Vancomycin for treatment of suspected sepsis    Drug Allergies:   Review of patient's allergies indicates:   Allergen Reactions    Mushroom Swelling     Tongue swells    Tongue swells       Tongue swells       Actual Body Weight:   128.4    Renal Function:   Estimated Creatinine Clearance: 9.4 mL/min (A) (based on SCr of 14.5 mg/dL (H)).,     Dialysis Method (if applicable):  N/A    CBC (last 72 hours):  Recent Labs   Lab Result Units 09/17/24  0851   WBC K/uL 14.64*   Hemoglobin g/dL 8.7*   Hematocrit % 27.2*   Platelets K/uL 319   Gran % % 80.1*   Lymph % % 6.0*   Mono % % 12.0   Eosinophil % % 0.0   Basophil % % 0.2   Differential Method  Automated       Metabolic Panel (last 72 hours):  Recent Labs   Lab Result Units 09/17/24  0851   Sodium mmol/L 141   Potassium mmol/L 4.4   Chloride mmol/L 92*   CO2 mmol/L 27   Glucose mg/dL 113*   BUN mg/dL 36*   Creatinine mg/dL 14.5*   Albumin g/dL 3.2*   Total Bilirubin mg/dL 1.2*   Alkaline Phosphatase U/L 72   AST U/L 14   ALT U/L 10   Magnesium mg/dL 1.7       Drug levels (last 3 results):  No results for input(s): "VANCOMYCINRA", "VANCORANDOM", "VANCOMYCINPE", "VANCOPEAK", "VANCOMYCINTR", "VANCOTROUGH" in the last 72 hours.    Microbiologic Results:  Microbiology Results (last 7 days)       Procedure Component Value Units Date/Time    Blood culture x two cultures. Draw prior to antibiotics. [[id number]] Collected: 09/17/24 0949 "    Order Status: Sent Specimen: Blood from Peripheral, Antecubital, Right     Blood culture x two cultures. Draw prior to antibiotics. [[id number]] Collected: 09/17/24 0949    Order Status: Sent Specimen: Blood from Peripheral, Antecubital, Right

## 2024-09-17 NOTE — Clinical Note
An angiography was performed of the AV fistulapost intervention  via hand injection with 10 mL of contrast

## 2024-09-17 NOTE — PLAN OF CARE
Released per anesthesia, when criteria met. VS WDL, Resp even and unlabored. IS reviewed and pt encouraged to continue independently. Dressing dry and intact, pain managed with meds. Sams patent with minimal urine, Glendy intact with serosang drainange. Abdominal incisions intact with bandaids. Penrose to left groin with large dry dressing noted.Will transfer with O2 and continuous pulse ox monitoring

## 2024-09-17 NOTE — PLAN OF CARE
Pt prepared for surgery. Pt resting in bed, with family/friend at bedside. Family signed up for text messaging. Belongings brought over to post op cabinet. IS teaching performed. Fall risk agreement reviewed and armband placed. Allergy, blood and limb armband placed. SCDs on.

## 2024-09-17 NOTE — ASSESSMENT & PLAN NOTE
CT abdomen shows Left inguinal hernia containing fat and air as well as marked inflammation extending from inside the pelvis, in the hernia and down into the left scrotum. This is consistent with an infected inguinal hernia, possible gangrene.   - Cont Vancomycin, cefepime and Clindamycin  - awaiting general surgery recommendations   - NPO  - follow blood cultures

## 2024-09-17 NOTE — Clinical Note
The site was marked. The left brachial was prepped. The site was prepped with ChloraPrep. The site was clipped. The patient was draped.

## 2024-09-17 NOTE — TRANSFER OF CARE
"Anesthesia Transfer of Care Note    Patient: João Ham    Procedure(s) Performed: Procedure(s) (LRB):  XI ROBOTIC SIGMOID RESECTION, WITH ANASTAMOSIS (N/A)  DRAINAGE, ABSCESS, GROIN (Left)    Patient location: PACU    Anesthesia Type: general    Transport from OR: Transported from OR on 2-3 L/min O2 by NC with adequate spontaneous ventilation    Post pain: adequate analgesia    Post assessment: no apparent anesthetic complications and tolerated procedure well    Post vital signs: stable    Level of consciousness: awake and agitated    Nausea/Vomiting: no nausea/vomiting    Complications: none    Transfer of care protocol was followed    Last vitals: Visit Vitals  /60 (BP Location: Right arm, Patient Position: Lying)   Pulse 104   Temp 37.3 °C (99.1 °F) (Skin)   Resp 16   Ht 6' 2" (1.88 m)   Wt 128.4 kg (283 lb)   SpO2 99%   BMI 36.34 kg/m²     "

## 2024-09-17 NOTE — H&P
[facility] Medicine  History & Physical    Patient Name: João Ham  MRN: [id number]  Patient Class: OP- Observation  Admission Date: 9/17/2024  Attending Physician: Jhon Quinn MD   Primary Care Provider: Dawson Granado MD         Patient information was obtained from patient and ER records.     Subjective:     Principal Problem:Inguinal hernia of left side with gangrene    Chief Complaint:   Chief Complaint   Patient presents with    Fatigue    Groin Swelling     X 2 days.  Hx of Kidney failure and CHF        HPI: Patient is a 44 year old male with history of ESDR on dialysis MWF, awaiting kidney transplant, HTN, presented to ED with 3 days history of left groin pain and swelling. States swelling comes every time he cough and is being painful. Patient has low grade fever 99s at home. He has been vomiting bilious fluid last 2-3 days. No diarrhea or abdominal pain.     In the ED CT abdomen showed Left inguinal hernia containing fat and air as well as marked inflammation extending from inside the pelvis, in the hernia and down into the left scrotum. This is consistent with an infected inguinal hernia, possible gangrene. WBC was 14, patient was tachycardic. General surgery was consulted and patient was admitted under hospitalist.     Past Medical History:   Diagnosis Date    Allergy     Anemia     Anxiety     CHF (congestive heart failure)     Depression     High blood pressure with chronic kidney disease, stage 5 chronic kidney disease or end stage renal disease     Hypertension        Past Surgical History:   Procedure Laterality Date    FISTULOGRAM Bilateral 4/30/2024    Procedure: Fistulogram;  Surgeon: Khoobehi, Ali, MD;  Location: [facility] CATH/EP LAB;  Service: Vascular;  Laterality: Bilateral;    HERNIA REPAIR Right     With mesh    INSERTION, CATHETER, TUNNELED N/A 6/22/2023    Procedure: Insertion,catheter,tunneled;  Surgeon: Everett Caicedo MD;  Location: [facility] OR;   Service: General;  Laterality: N/A;    PERCUTANEOUS TRANSLUMINAL ANGIOPLASTY OF ARTERIOVENOUS FISTULA Left 4/30/2024    Procedure: PTA, AV FISTULA;  Surgeon: Khoobehi, Ali, MD;  Location: [facility] CATH/EP LAB;  Service: Vascular;  Laterality: Left;    PHLEBOGRAPHY N/A 7/19/2024    Procedure: Venogram;  Surgeon: Khoobehi, Ali, MD;  Location: [facility] CATH/EP LAB;  Service: Vascular;  Laterality: N/A;    REMOVAL, TUNNELED CATH Right 7/19/2024    Procedure: REMOVAL, TUNNELED CATH;  Surgeon: Khoobehi, Ali, MD;  Location: [facility] CATH/EP LAB;  Service: Vascular;  Laterality: Right;       Review of patient's allergies indicates:   Allergen Reactions    Mushroom Swelling     Tongue swells    Tongue swells       Tongue swells       No current facility-administered medications on file prior to encounter.     Current Outpatient Medications on File Prior to Encounter   Medication Sig    apixaban (ELIQUIS) 2.5 mg Tab Take 2.5 mg by mouth 2 (two) times daily.    calcitRIOL (ROCALTROL) 0.25 MCG Cap Take 1 capsule by mouth once daily (Patient taking differently: Take 0.25 mcg by mouth once daily.)    calcium carbonate (TUMS) 200 mg calcium (500 mg) chewable tablet Take 2 tablets (1,000 mg total) by mouth 3 (three) times daily with meals. (Patient taking differently: Take 1,000 mg by mouth 3 (three) times daily.)    cholecalciferol, vitamin D3, 125 mcg (5,000 unit) Tab Take 1 tablet by mouth once daily.    cloNIDine (CATAPRES) 0.3 MG tablet TAKE 1 TABLET BY MOUTH THREE TIMES DAILY    hydrALAZINE (APRESOLINE) 100 MG tablet Take 1 tablet (100 mg total) by mouth 3 (three) times daily.    isosorbide mononitrate (IMDUR) 120 MG 24 hr tablet Take 120 mg by mouth once daily.    metoprolol succinate (TOPROL-XL) 50 MG 24 hr tablet Take 150 mg by mouth once daily.    olmesartan (BENICAR) 40 MG tablet Take 1 tablet by mouth once daily    sevelamer carbonate (RENVELA) 800 mg Tab Take 2 tablets (1,600 mg total) by mouth 3 (three) times daily with meals.      Family History    None       Tobacco Use    Smoking status: Never     Passive exposure: Never    Smokeless tobacco: Never   Substance and Sexual Activity    Alcohol use: Never    Drug use: Never    Sexual activity: Not Currently     Review of Systems   Constitutional:  Positive for fatigue and fever. Negative for chills.   HENT:  Negative for congestion, ear pain, sinus pressure and sore throat.    Eyes:  Negative for redness and itching.   Respiratory:  Negative for cough, chest tightness and shortness of breath.    Cardiovascular:  Negative for chest pain, palpitations and leg swelling.   Gastrointestinal:  Positive for nausea and vomiting. Negative for abdominal pain, constipation and diarrhea.   Endocrine: Negative for polydipsia, polyphagia and polyuria.   Genitourinary:  Negative for dysuria, flank pain, frequency, hematuria and urgency.   Musculoskeletal:  Negative for back pain, joint swelling and myalgias.   Skin:  Negative for pallor, rash and wound.   Neurological:  Positive for weakness. Negative for dizziness, syncope, light-headedness, numbness and headaches.   Hematological:  Negative for adenopathy. Does not bruise/bleed easily.   Psychiatric/Behavioral:  Negative for agitation, hallucinations and suicidal ideas. The patient is not nervous/anxious.    All other systems reviewed and are negative.    Objective:     Vital Signs (Most Recent):  Temp: 99 °F (37.2 °C) (09/17/24 0829)  Pulse: (!) 116 (09/17/24 0935)  Resp: 20 (09/17/24 0928)  BP: (!) 107/56 (09/17/24 0829)  SpO2: 96 % (09/17/24 0935) Vital Signs (24h Range):  Temp:  [99 °F (37.2 °C)] 99 °F (37.2 °C)  Pulse:  [116-133] 116  Resp:  [16-20] 20  SpO2:  [95 %-96 %] 96 %  BP: (107)/(56) 107/56     Weight: 128.4 kg (283 lb)  Body mass index is 36.34 kg/m².     Physical Exam  Vitals and nursing note reviewed.   Constitutional:       General: He is not in acute distress.     Appearance: He is obese. He is not toxic-appearing.   HENT:      Head:  Atraumatic.      Mouth/Throat:      Mouth: Mucous membranes are moist.      Pharynx: Oropharynx is clear.   Eyes:      General: No scleral icterus.     Conjunctiva/sclera: Conjunctivae normal.      Pupils: Pupils are equal, round, and reactive to light.   Cardiovascular:      Rate and Rhythm: Regular rhythm. Tachycardia present.      Heart sounds: No murmur heard.  Pulmonary:      Effort: No respiratory distress.      Breath sounds: No wheezing, rhonchi or rales.   Abdominal:      General: Abdomen is flat. Bowel sounds are normal.      Palpations: Abdomen is soft.   Genitourinary:     Comments: There is left groin swelling with mild erythema, mostly nontender.   Musculoskeletal:         General: No swelling or deformity.      Cervical back: No rigidity or tenderness.   Skin:     Coloration: Skin is not jaundiced or pale.      Findings: No bruising, erythema or rash.   Neurological:      General: No focal deficit present.      Mental Status: He is alert and oriented to person, place, and time.      Cranial Nerves: No cranial nerve deficit.      Sensory: No sensory deficit.      Motor: No weakness.   Psychiatric:         Mood and Affect: Mood normal.         Behavior: Behavior normal.              CRANIAL NERVES     CN III, IV, VI   Pupils are equal, round, and reactive to light.       Significant Labs: All pertinent labs within the past 24 hours have been reviewed.  CBC:   Recent Labs   Lab 09/17/24  0851   WBC 14.64*   HGB 8.7*   HCT 27.2*        CMP:   Recent Labs   Lab 09/17/24  0851      K 4.4   CL 92*   CO2 27   *   BUN 36*   CREATININE 14.5*   CALCIUM 9.2   PROT 8.6*   ALBUMIN 3.2*   BILITOT 1.2*   ALKPHOS 72   AST 14   ALT 10   ANIONGAP 22*       Significant Imaging: I have reviewed all pertinent imaging results/findings within the past 24 hours.  Assessment/Plan:     * Inguinal hernia of left side with gangrene  CT abdomen shows Left inguinal hernia containing fat and air as well as marked  inflammation extending from inside the pelvis, in the hernia and down into the left scrotum. This is consistent with an infected inguinal hernia, possible gangrene.   - Cont Vancomycin, cefepime and Clindamycin  - awaiting general surgery recommendations   - NPO  - follow blood cultures       Essential hypertension  Chronic, controlled. Latest blood pressure and vitals reviewed-     Home meds for hypertension were reviewed and noted below.   Hypertension Medications               cloNIDine (CATAPRES) 0.3 MG tablet TAKE 1 TABLET BY MOUTH THREE TIMES DAILY    hydrALAZINE (APRESOLINE) 100 MG tablet Take 1 tablet (100 mg total) by mouth 3 (three) times daily.    isosorbide mononitrate (IMDUR) 120 MG 24 hr tablet Take 120 mg by mouth once daily.    metoprolol succinate (TOPROL-XL) 50 MG 24 hr tablet Take 150 mg by mouth once daily.    olmesartan (BENICAR) 40 MG tablet Take 1 tablet by mouth once daily            While in the hospital, will manage blood pressure as follows; Continue home antihypertensive regimen    Will utilize p.r.n. blood pressure medication only if patient's blood pressure greater than 140/90 and he develops symptoms such as worsening chest pain or shortness of breath.    Sleep apnea  CPAP per home settings       Pulmonary hypertension  Resume home meds       ESRD (end stage renal disease)  Nephrology consulted for routine dialysis MWF  Renal dose medications       VTE Risk Mitigation (From admission, onward)           Ordered     heparin (porcine) injection 7,500 Units  Every 8 hours         09/17/24 1031     IP VTE HIGH RISK PATIENT  Once         09/17/24 1031     Place sequential compression device  Until discontinued         09/17/24 1031                       On 09/17/2024, patient should be placed in hospital observation services under my care.        Pharmacokinetic Initial Assessment: IV Vancomycin    Assessment/Plan:    Initiate intravenous vancomycin with loading dose of 750 mg once   Desired  "empiric serum trough concentration is 15 to 20 mcg/mL  Draw vancomycin random level on 9/18 at 1100.  Pharmacy will continue to follow and monitor vancomycin.      Please contact pharmacy at extension [phone] with any questions regarding this assessment.     Thank you for the consult,   Maude Villaen       Patient brief summary:  João Ham is a 44 y.o. male initiated on antimicrobial therapy with IV Vancomycin for treatment of suspected sepsis    Drug Allergies:   Review of patient's allergies indicates:   Allergen Reactions    Mushroom Swelling     Tongue swells    Tongue swells       Tongue swells       Actual Body Weight:   128.4    Renal Function:   Estimated Creatinine Clearance: 9.4 mL/min (A) (based on SCr of 14.5 mg/dL (H)).,     Dialysis Method (if applicable):  N/A    CBC (last 72 hours):  Recent Labs   Lab Result Units 09/17/24  0851   WBC K/uL 14.64*   Hemoglobin g/dL 8.7*   Hematocrit % 27.2*   Platelets K/uL 319   Gran % % 80.1*   Lymph % % 6.0*   Mono % % 12.0   Eosinophil % % 0.0   Basophil % % 0.2   Differential Method  Automated       Metabolic Panel (last 72 hours):  Recent Labs   Lab Result Units 09/17/24  0851   Sodium mmol/L 141   Potassium mmol/L 4.4   Chloride mmol/L 92*   CO2 mmol/L 27   Glucose mg/dL 113*   BUN mg/dL 36*   Creatinine mg/dL 14.5*   Albumin g/dL 3.2*   Total Bilirubin mg/dL 1.2*   Alkaline Phosphatase U/L 72   AST U/L 14   ALT U/L 10   Magnesium mg/dL 1.7       Drug levels (last 3 results):  No results for input(s): "VANCOMYCINRA", "VANCORANDOM", "VANCOMYCINPE", "VANCOPEAK", "VANCOMYCINTR", "VANCOTROUGH" in the last 72 hours.    Microbiologic Results:  Microbiology Results (last 7 days)       Procedure Component Value Units Date/Time    Blood culture x two cultures. Draw prior to antibiotics. [[id number]] Collected: 09/17/24 0949    Order Status: Sent Specimen: Blood from Peripheral, Antecubital, Right     Blood culture x two cultures. Draw prior to antibiotics. [[id number]] " Collected: 09/17/24 0949    Order Status: Sent Specimen: Blood from Peripheral, Antecubital, Right               Jhon Quinn MD  Department of Hospital Medicine  [facility]

## 2024-09-18 LAB
ALBUMIN SERPL BCP-MCNC: 2.5 G/DL (ref 3.5–5.2)
ALP SERPL-CCNC: 63 U/L (ref 55–135)
ALT SERPL W/O P-5'-P-CCNC: 9 U/L (ref 10–44)
ANION GAP SERPL CALC-SCNC: 20 MMOL/L (ref 8–16)
AST SERPL-CCNC: 16 U/L (ref 10–40)
BASOPHILS NFR BLD: 0 % (ref 0–1.9)
BILIRUB SERPL-MCNC: 0.8 MG/DL (ref 0.1–1)
BUN SERPL-MCNC: 47 MG/DL (ref 6–20)
CALCIUM SERPL-MCNC: 8.3 MG/DL (ref 8.7–10.5)
CHLORIDE SERPL-SCNC: 93 MMOL/L (ref 95–110)
CO2 SERPL-SCNC: 21 MMOL/L (ref 23–29)
CREAT SERPL-MCNC: 16.2 MG/DL (ref 0.5–1.4)
DIFFERENTIAL METHOD BLD: ABNORMAL
EOSINOPHIL NFR BLD: 0 % (ref 0–8)
ERYTHROCYTE [DISTWIDTH] IN BLOOD BY AUTOMATED COUNT: 15.9 % (ref 11.5–14.5)
EST. GFR  (NO RACE VARIABLE): 3 ML/MIN/1.73 M^2
GLUCOSE SERPL-MCNC: 121 MG/DL (ref 70–110)
HCT VFR BLD AUTO: 23.9 % (ref 40–54)
HGB BLD-MCNC: 7.7 G/DL (ref 14–18)
IMM GRANULOCYTES # BLD AUTO: ABNORMAL K/UL
IMM GRANULOCYTES NFR BLD AUTO: ABNORMAL %
LYMPHOCYTES NFR BLD: 10 % (ref 18–48)
MAGNESIUM SERPL-MCNC: 1.9 MG/DL (ref 1.6–2.6)
MCH RBC QN AUTO: 32.6 PG (ref 27–31)
MCHC RBC AUTO-ENTMCNC: 32.2 G/DL (ref 32–36)
MCV RBC AUTO: 101 FL (ref 82–98)
MONOCYTES NFR BLD: 14 % (ref 4–15)
NEUTROPHILS NFR BLD: 76 % (ref 38–73)
NRBC BLD-RTO: 0 /100 WBC
PLATELET # BLD AUTO: 255 K/UL (ref 150–450)
PLATELET BLD QL SMEAR: ABNORMAL
PMV BLD AUTO: 10.5 FL (ref 9.2–12.9)
POCT GLUCOSE: 132 MG/DL (ref 70–110)
POCT GLUCOSE: 145 MG/DL (ref 70–110)
POCT GLUCOSE: 148 MG/DL (ref 70–110)
POCT GLUCOSE: 167 MG/DL (ref 70–110)
POCT GLUCOSE: 170 MG/DL (ref 70–110)
POCT GLUCOSE: 205 MG/DL (ref 70–110)
POTASSIUM SERPL-SCNC: 6.2 MMOL/L (ref 3.5–5.1)
PROT SERPL-MCNC: 7.3 G/DL (ref 6–8.4)
RBC # BLD AUTO: 2.36 M/UL (ref 4.6–6.2)
SODIUM SERPL-SCNC: 134 MMOL/L (ref 136–145)
VANCOMYCIN SERPL-MCNC: 12.6 UG/ML
WBC # BLD AUTO: 9.12 K/UL (ref 3.9–12.7)

## 2024-09-18 PROCEDURE — 27000221 HC OXYGEN, UP TO 24 HOURS

## 2024-09-18 PROCEDURE — 25000003 PHARM REV CODE 250: Performed by: INTERNAL MEDICINE

## 2024-09-18 PROCEDURE — 5A1D70Z PERFORMANCE OF URINARY FILTRATION, INTERMITTENT, LESS THAN 6 HOURS PER DAY: ICD-10-PCS | Performed by: INTERNAL MEDICINE

## 2024-09-18 PROCEDURE — 80202 ASSAY OF VANCOMYCIN: CPT | Performed by: INTERNAL MEDICINE

## 2024-09-18 PROCEDURE — 83735 ASSAY OF MAGNESIUM: CPT | Performed by: INTERNAL MEDICINE

## 2024-09-18 PROCEDURE — 80053 COMPREHEN METABOLIC PANEL: CPT | Performed by: INTERNAL MEDICINE

## 2024-09-18 PROCEDURE — 85027 COMPLETE CBC AUTOMATED: CPT | Performed by: INTERNAL MEDICINE

## 2024-09-18 PROCEDURE — 85007 BL SMEAR W/DIFF WBC COUNT: CPT | Performed by: INTERNAL MEDICINE

## 2024-09-18 PROCEDURE — 63600175 PHARM REV CODE 636 W HCPCS: Performed by: INTERNAL MEDICINE

## 2024-09-18 PROCEDURE — 94761 N-INVAS EAR/PLS OXIMETRY MLT: CPT

## 2024-09-18 PROCEDURE — 36415 COLL VENOUS BLD VENIPUNCTURE: CPT | Performed by: INTERNAL MEDICINE

## 2024-09-18 PROCEDURE — 90935 HEMODIALYSIS ONE EVALUATION: CPT

## 2024-09-18 PROCEDURE — 94660 CPAP INITIATION&MGMT: CPT

## 2024-09-18 PROCEDURE — 11000001 HC ACUTE MED/SURG PRIVATE ROOM

## 2024-09-18 PROCEDURE — 63600175 PHARM REV CODE 636 W HCPCS: Performed by: SURGERY

## 2024-09-18 PROCEDURE — 25000003 PHARM REV CODE 250: Performed by: SURGERY

## 2024-09-18 PROCEDURE — 99900035 HC TECH TIME PER 15 MIN (STAT)

## 2024-09-18 PROCEDURE — 25000003 PHARM REV CODE 250

## 2024-09-18 RX ORDER — SODIUM CHLORIDE 9 MG/ML
INJECTION, SOLUTION INTRAVENOUS
Status: DISCONTINUED | OUTPATIENT
Start: 2024-09-18 | End: 2024-10-06 | Stop reason: HOSPADM

## 2024-09-18 RX ORDER — SODIUM BICARBONATE 1 MEQ/ML
50 SYRINGE (ML) INTRAVENOUS ONCE
Status: COMPLETED | OUTPATIENT
Start: 2024-09-18 | End: 2024-09-18

## 2024-09-18 RX ORDER — SODIUM CHLORIDE 9 MG/ML
INJECTION, SOLUTION INTRAVENOUS ONCE
Status: CANCELLED | OUTPATIENT
Start: 2024-09-18 | End: 2024-09-18

## 2024-09-18 RX ORDER — CALCIUM GLUCONATE 20 MG/ML
1 INJECTION, SOLUTION INTRAVENOUS ONCE
Status: COMPLETED | OUTPATIENT
Start: 2024-09-18 | End: 2024-09-18

## 2024-09-18 RX ORDER — HEPARIN SODIUM 5000 [USP'U]/ML
5000 INJECTION, SOLUTION INTRAVENOUS; SUBCUTANEOUS
Status: CANCELLED | OUTPATIENT
Start: 2024-09-18

## 2024-09-18 RX ORDER — FUROSEMIDE 10 MG/ML
120 INJECTION INTRAMUSCULAR; INTRAVENOUS ONCE
Status: COMPLETED | OUTPATIENT
Start: 2024-09-18 | End: 2024-09-18

## 2024-09-18 RX ADMIN — HYDROMORPHONE HYDROCHLORIDE 1 MG: 1 INJECTION, SOLUTION INTRAMUSCULAR; INTRAVENOUS; SUBCUTANEOUS at 12:09

## 2024-09-18 RX ADMIN — CLINDAMYCIN IN 5 PERCENT DEXTROSE 900 MG: 18 INJECTION, SOLUTION INTRAVENOUS at 12:09

## 2024-09-18 RX ADMIN — CLINDAMYCIN IN 5 PERCENT DEXTROSE 900 MG: 18 INJECTION, SOLUTION INTRAVENOUS at 09:09

## 2024-09-18 RX ADMIN — CALCIUM GLUCONATE 1 G: 20 INJECTION, SOLUTION INTRAVENOUS at 09:09

## 2024-09-18 RX ADMIN — SODIUM BICARBONATE 50 MEQ: 84 INJECTION INTRAVENOUS at 09:09

## 2024-09-18 RX ADMIN — HYDROMORPHONE HYDROCHLORIDE 1 MG: 1 INJECTION, SOLUTION INTRAMUSCULAR; INTRAVENOUS; SUBCUTANEOUS at 09:09

## 2024-09-18 RX ADMIN — DEXTROSE MONOHYDRATE 250 ML: 100 INJECTION, SOLUTION INTRAVENOUS at 09:09

## 2024-09-18 RX ADMIN — INSULIN HUMAN 10 UNITS: 100 INJECTION, SOLUTION PARENTERAL at 09:09

## 2024-09-18 RX ADMIN — HYDROCODONE BITARTRATE AND ACETAMINOPHEN 1 TABLET: 5; 325 TABLET ORAL at 04:09

## 2024-09-18 RX ADMIN — BISACODYL 5 MG: 5 TABLET, COATED ORAL at 09:09

## 2024-09-18 RX ADMIN — HEPARIN SODIUM 7500 UNITS: 5000 INJECTION INTRAVENOUS; SUBCUTANEOUS at 03:09

## 2024-09-18 RX ADMIN — HEPARIN SODIUM 7500 UNITS: 5000 INJECTION INTRAVENOUS; SUBCUTANEOUS at 06:09

## 2024-09-18 RX ADMIN — ALUMINUM HYDROXIDE, MAGNESIUM HYDROXIDE, AND SIMETHICONE 30 ML: 1200; 120; 1200 SUSPENSION ORAL at 09:09

## 2024-09-18 RX ADMIN — CEFEPIME 2 G: 2 INJECTION, POWDER, FOR SOLUTION INTRAVENOUS at 12:09

## 2024-09-18 RX ADMIN — ACETAMINOPHEN 1000 MG: 10 INJECTION, SOLUTION INTRAVENOUS at 06:09

## 2024-09-18 RX ADMIN — GABAPENTIN 200 MG: 100 CAPSULE ORAL at 09:09

## 2024-09-18 RX ADMIN — MUPIROCIN 1 G: 20 OINTMENT TOPICAL at 09:09

## 2024-09-18 RX ADMIN — HYDROMORPHONE HYDROCHLORIDE 1 MG: 1 INJECTION, SOLUTION INTRAMUSCULAR; INTRAVENOUS; SUBCUTANEOUS at 10:09

## 2024-09-18 RX ADMIN — HYDROCODONE BITARTRATE AND ACETAMINOPHEN 1 TABLET: 5; 325 TABLET ORAL at 03:09

## 2024-09-18 RX ADMIN — ACETAMINOPHEN 1000 MG: 10 INJECTION, SOLUTION INTRAVENOUS at 03:09

## 2024-09-18 RX ADMIN — SODIUM CHLORIDE, SODIUM LACTATE, POTASSIUM CHLORIDE, CALCIUM CHLORIDE AND DEXTROSE MONOHYDRATE: 5; 600; 310; 30; 20 INJECTION, SOLUTION INTRAVENOUS at 02:09

## 2024-09-18 RX ADMIN — SODIUM ZIRCONIUM CYCLOSILICATE 10 G: 10 POWDER, FOR SUSPENSION ORAL at 09:09

## 2024-09-18 RX ADMIN — HYDROCODONE BITARTRATE AND ACETAMINOPHEN 1 TABLET: 5; 325 TABLET ORAL at 11:09

## 2024-09-18 RX ADMIN — CLINDAMYCIN IN 5 PERCENT DEXTROSE 900 MG: 18 INJECTION, SOLUTION INTRAVENOUS at 03:09

## 2024-09-18 RX ADMIN — MELATONIN TAB 3 MG 6 MG: 3 TAB at 11:09

## 2024-09-18 RX ADMIN — FUROSEMIDE 120 MG: 10 INJECTION, SOLUTION INTRAVENOUS at 09:09

## 2024-09-18 RX ADMIN — VANCOMYCIN HYDROCHLORIDE 750 MG: 750 INJECTION, POWDER, LYOPHILIZED, FOR SOLUTION INTRAVENOUS at 04:09

## 2024-09-18 RX ADMIN — HEPARIN SODIUM 7500 UNITS: 5000 INJECTION INTRAVENOUS; SUBCUTANEOUS at 09:09

## 2024-09-18 RX ADMIN — SENNOSIDES AND DOCUSATE SODIUM 1 TABLET: 8.6; 5 TABLET ORAL at 09:09

## 2024-09-18 NOTE — SUBJECTIVE & OBJECTIVE
Interval History: Patient states he is in pain. She hs not feeling nauseous anymore. He is afebrile. Due for dialysis today.     Review of Systems  Objective:     Vital Signs (Most Recent):  Temp: 98.5 °F (36.9 °C) (09/18/24 1221)  Pulse: 92 (09/18/24 1221)  Resp: 18 (09/18/24 1221)  BP: (!) 98/58 (09/18/24 1221)  SpO2: 95 % (09/18/24 1221) Vital Signs (24h Range):  Temp:  [97.9 °F (36.6 °C)-99.1 °F (37.3 °C)] 98.5 °F (36.9 °C)  Pulse:  [] 92  Resp:  [13-26] 18  SpO2:  [89 %-99 %] 95 %  BP: ()/(54-96) 98/58  Arterial Line BP: (115)/(72) 115/72     Weight: 134 kg (295 lb 6.7 oz)  Body mass index is 37.93 kg/m².    Intake/Output Summary (Last 24 hours) at 9/18/2024 1238  Last data filed at 9/18/2024 0644  Gross per 24 hour   Intake 2289.82 ml   Output 120 ml   Net 2169.82 ml         Physical Exam  Vitals and nursing note reviewed.   Constitutional:       General: He is not in acute distress.     Appearance: He is obese. He is not toxic-appearing.   HENT:      Head: Atraumatic.      Mouth/Throat:      Mouth: Mucous membranes are moist.      Pharynx: Oropharynx is clear.   Eyes:      General: No scleral icterus.     Conjunctiva/sclera: Conjunctivae normal.      Pupils: Pupils are equal, round, and reactive to light.   Cardiovascular:      Rate and Rhythm: Regular rhythm.      Heart sounds: No murmur heard.  Pulmonary:      Effort: No respiratory distress.      Breath sounds: No wheezing, rhonchi or rales.   Abdominal:      General: Abdomen is flat. Bowel sounds are normal.      Palpations: Abdomen is soft.   Genitourinary:     Comments: There is left groin dressing and also laparoscopic scars in the abdomen   Musculoskeletal:         General: No swelling or deformity.      Cervical back: No rigidity or tenderness.   Skin:     Coloration: Skin is not jaundiced or pale.      Findings: No bruising, erythema or rash.   Neurological:      General: No focal deficit present.      Mental Status: He is alert and  oriented to person, place, and time.      Cranial Nerves: No cranial nerve deficit.      Sensory: No sensory deficit.      Motor: No weakness.   Psychiatric:         Mood and Affect: Mood normal.         Behavior: Behavior normal.     Significant Labs: All pertinent labs within the past 24 hours have been reviewed.  CBC:   Recent Labs   Lab 09/17/24  0851 09/18/24  0430   WBC 14.64* 9.12   HGB 8.7* 7.7*   HCT 27.2* 23.9*    255     CMP:   Recent Labs   Lab 09/17/24  0851 09/18/24  0429    134*   K 4.4 6.2*   CL 92* 93*   CO2 27 21*   * 121*   BUN 36* 47*   CREATININE 14.5* 16.2*   CALCIUM 9.2 8.3*   PROT 8.6* 7.3   ALBUMIN 3.2* 2.5*   BILITOT 1.2* 0.8   ALKPHOS 72 63   AST 14 16   ALT 10 9*   ANIONGAP 22* 20*       Significant Imaging: I have reviewed all pertinent imaging results/findings within the past 24 hours.

## 2024-09-18 NOTE — NURSING
Bedside off received from Kimberlee Rn. Grandmother at the bedside. Did 4-eyes with the PACU nurses. IV sites dated. Fistula to left forearm is covered with a dressing that is CDI. All of the pt's dressings are CDI. BERNABE drain and penrose drain present. Pt appears to be comfortable at this time. Pt's grandmother states he goes to dialysis MWF. Sams in place and due to be removed at 0600. IS at the bedside. Explained to the pt and grandmother that he will need to ambulate tonight. Understanding voiced. Bed alarm set and fall precautions in place. Pt aware of his NPO status. Vitals WNL. Nasal cannula in place. No needs at this time. Call light, cell phone, and bedside table within reach.

## 2024-09-18 NOTE — NURSING
Pt ambulated the entire unit this morning. Draining noted to the left groin dressing. Sams removed at 0610 and pt tolerated the removal well.

## 2024-09-18 NOTE — PLAN OF CARE
Plan of care and medications discussed with the pt and his grandmother. Questions answered. IV fluids infusing. IV antibiotics given. Pt received pain medication multiple times overnight. Frequent rounds made. Pt wore the Cpap the majority of the night. Personal items and call light within reach. Sams will be removed at 0600. Surgical dressings CDI. Stu drain emptied. At this time the pt has remained free from falls and injury.   Problem: Adult Inpatient Plan of Care  Goal: Plan of Care Review  Outcome: Progressing  Goal: Patient-Specific Goal (Individualized)  Outcome: Progressing  Goal: Absence of Hospital-Acquired Illness or Injury  Outcome: Progressing  Goal: Optimal Comfort and Wellbeing  Outcome: Progressing  Goal: Readiness for Transition of Care  Outcome: Progressing     Problem: Wound  Goal: Optimal Coping  Outcome: Progressing  Goal: Optimal Functional Ability  Outcome: Progressing  Goal: Absence of Infection Signs and Symptoms  Outcome: Progressing  Goal: Improved Oral Intake  Outcome: Progressing  Goal: Optimal Pain Control and Function  Outcome: Progressing  Goal: Skin Health and Integrity  Outcome: Progressing  Goal: Optimal Wound Healing  Outcome: Progressing     Problem: Infection  Goal: Absence of Infection Signs and Symptoms  Outcome: Progressing     Problem: Skin Injury Risk Increased  Goal: Skin Health and Integrity  Outcome: Progressing

## 2024-09-18 NOTE — NURSING
HD: Attempted ny multiple staff members to access pts AVF unsuccessful, will get a flash but when needle is advanced blood return stops.  Per pt, staff at clinic has been having difficulty and does not insert the needle all the way in, leaves it sticking out.  With safety being a concern, this is not an acceptable practice. Dr. Hickey notified, awaiting further orders.    1900: Dr. Hickey states he will put orders in for pt to address his potassium. See MAR. HD canceled for tonight due to AVF malfunction.

## 2024-09-18 NOTE — HOSPITAL COURSE
Patient is a 44 year old male with history of ESRD, was admitted with suspected inguinal hernia gangrene. General surgery was consulted and patient was taken to OR. Found to have colonic perforation due to mesh erosion with an abscess. Patient underwent sigmoid resection and drainage of the abscess, was started on clindamycin, Vancomycin and cefepime. Nephrology was consulted for chronic dialysis. AVF was not functioning, General surgery consulted, trialysis catheter placed. PRBC transfused during dialysis for low Hb 6.6.  While on broad-spectrum coverage, patient is spiking fevers and worsening leukocytosis, id consulted, discontinued clindamycin, vancomycin and cefepime-added daptomycin, Diflucan and Zosyn.  Repeated CT abdomen and pelvis that showed residual abscess/phlegmon and contained perforation. Re-consulted surgery who mentioned likely post op changes. Fistulogram 9/24 by vascular surgery for declotting the AV fistula. Trialysis removed 9/25. Had worsening white count, fevers upto 103.5 and hypotension overnight 9/25.  Also had hemoglobin of 7, with worsening hypoxia up to 7 L (overnight desaturated to 70% with lethargy requiring BiPAP placement), after which nephrology recommended 1 unit PRBC transfusion.  Id, Nephrology, Urology, General surgery were updated about his worsening condition.We CT repeated that showed intraperitoneal free air and findings suggestive of necrotizing fasciitis, general surgery was consulted stat, patient was transferred to ICU.  The surgeon performed open laparotomy and noted fecal contamination of the left lower quadrant indicative of anastomotic disruption.  Colon was resected, and colostomy was created. Patient was placed on full liquid diet, was unable to tolerate and downgraded back to clear liquid diet.  Also complaining of excess mucus and cough.  Patient encouraged to use incentive spirometer as directed, and press pillow to abdomen when coughing.  Pulmonology signed  off on 09/29 with recommendation to continue BiPAP nightly and with naps.  PT/OT recommended high-intensity therapy due to patient's deconditioning and discharge planning was started.  Pain regimen was adjusted.  Tachycardia and stagnant white blood cell count noted with low-grade temps-a CT abdomen and pelvis was pursued on 10/02.  This was revealing for 2 new fluid collections.  These were discussed with general surgery and IR.  IR did do an aspirate of the collection they felt easily accessible, this was negative on Gram stain with cultures pending.  Did not feel either fluid collection with rim enhancing or indicative of an abscess.  General surgery agreed.  Leukocytosis improved.  Temperature is improved.  He had issues with ongoing pain control and long-acting pain regimen was added. Patient cleared for discharge from surgical standpoint with f/u in 1-2 weeks. BERNABE drain removed and surgical site CDI. Patient assessed on day of discharge and stable for discharge. Some overnight epistaxis reported but patient also reported that he was picking and blowing his nose a lot. Educated on nose bleeds and education included in discharge instructions. Patient also instructed to only use Afrin 1-2 times in a 24 hour period and not to exceed 3 days. This was also included in discharge instructions. Patient will continue abx thru 10/9 and then have midline removed. He is to f/u with ID in 3 weeks. He is also to f/u with urology in 2 weeks. Plan discussed with patient and he is agreeable and ready for discharge.

## 2024-09-18 NOTE — PROGRESS NOTES
POD 1 s/p robotic segmental colectomy with opening of L inguinal canal secondary to mesh erosion into the sigmoid colon    Pt doing well.  Feels better.  Denies n/v.  No fever/chills.  Notes swelling improving.      Wt Readings from Last 3 Encounters:   09/18/24 134 kg (295 lb 6.7 oz)   08/29/24 131.3 kg (289 lb 7.4 oz)   08/22/24 131 kg (288 lb 12.8 oz)     Temp Readings from Last 3 Encounters:   09/18/24 98.5 °F (36.9 °C) (Oral)   08/22/24 97.3 °F (36.3 °C) (Temporal)   07/30/24 98.6 °F (37 °C) (Oral)     BP Readings from Last 3 Encounters:   09/18/24 (!) 98/58   08/29/24 99/68   08/22/24 (!) 137/91     Pulse Readings from Last 3 Encounters:   09/18/24 92   08/29/24 (!) 115   08/22/24 110     AAOx3  Sinus  Soft/nd/appt ttp  BS present  2+ pulses    Lab Results   Component Value Date    WBC 9.12 09/18/2024    HGB 7.7 (L) 09/18/2024    HCT 23.9 (L) 09/18/2024     (H) 09/18/2024     09/18/2024       BMP  Lab Results   Component Value Date     (L) 09/18/2024    K 6.2 (HH) 09/18/2024    CL 93 (L) 09/18/2024    CO2 21 (L) 09/18/2024    BUN 47 (H) 09/18/2024    CREATININE 16.2 (H) 09/18/2024    CALCIUM 8.3 (L) 09/18/2024    ANIONGAP 20 (H) 09/18/2024    EGFRNORACEVR 3 (A) 09/18/2024     A/P: s/p robotic segmental resection  Stgart clears  Aggressive IS  Ambulate tid  Would consider transfusion with dialysis.   Change inguinal dressings.       Malnutrition/Pt meets criteria for severe protein/calorie malnutrition in context of chronic illness secondary to significant weight loss, severe fat/muscle wasting, and poor po intake,.

## 2024-09-18 NOTE — PLAN OF CARE
[facility] - Med/Surg  Initial Discharge Assessment       Primary Care Provider: Dawson Granado MD    Admission Diagnosis: Scrotal edema [N50.89]    Admission Date: 9/17/2024  Expected Discharge Date:     Spoke to pt at bedside to complete assessment. Facesheet verified. Pt lives alone. PCP [facility]. [facility]. Reports independence, drives self. HD MWF [facility]. Denies hh/coumadin. CM To follow    Transition of Care Barriers: None    Payor: BLUE CROSS BLUE SHIELD / Plan: BCBS ALL OUT OF STATE / Product Type: PPO /     Extended Emergency Contact Information  Primary Emergency Contact: Khushboo Guidry  Mobile Phone: [phone]  Relation: Grandparent  Preferred language: English   needed? No  Secondary Emergency Contact: janae escamilla  Mobile Phone: [phone]  Relation: Daughter    Discharge Plan A: Home with family  Discharge Plan B: Home      [facility] [place] - [address]  [address] [address]  Phone: [phone] Fax: [phone]      Initial Assessment (most recent)       Adult Discharge Assessment - 09/18/24 1036          Discharge Assessment    Assessment Type Discharge Planning Assessment     Confirmed/corrected address, phone number and insurance Yes     Confirmed Demographics Correct on Facesheet     Source of Information patient     People in Home alone     Do you expect to return to your current living situation? Yes     Prior to hospitilization cognitive status: Alert/Oriented     Current cognitive status: Alert/Oriented     Walking or Climbing Stairs Difficulty no     Dressing/Bathing Difficulty no     Equipment Currently Used at Home CPAP     Readmission within 30 days? No     Patient currently being followed by outpatient case management? No     Do you currently have service(s) that help you manage your care at home? No     Do you take prescription medications? Yes     Do you have prescription coverage? Yes     Coverage  bcbs     Do you have any problems affording any of your prescribed medications? No     Is the patient taking medications as prescribed? yes     How do you get to doctors appointments? car, drives self;family or friend will provide     Are you on dialysis? No     Do you take coumadin? No     Discharge Plan A Home with family     Discharge Plan B Home     DME Needed Upon Discharge  none     Discharge Plan discussed with: Patient     Transition of Care Barriers None

## 2024-09-18 NOTE — PROGRESS NOTES
Pharmacokinetic Assessment: IV Vancomycin     Vancomycin Day # 2    Indication & Goal: Skin & Soft Tissue Infection - Trough 15 - 20    Patient Labs  134 kg (295 lb 6.7 oz)  Recent Labs   Lab 09/17/24  0851 09/18/24  0429 09/18/24  0430   WBC 14.64*  --  9.12   CREATININE 14.5* 16.2*  --     Estimated Creatinine Clearance: 8.6 mL/min (A) (based on SCr of 16.2 mg/dL (H)).    Dialysis N/A    Drug levels (last 3 results):  Recent Labs   Lab Result Units 07/16/24  1958 09/18/24  1112   Vancomycin, Random ug/mL  --  12.6   Vancomycin-Trough ug/mL 27.5*  --        Assessment:   Level was taken approximately 21 hours post dose of 750 mg  on 9/17.  The random level was drawn correctly and can be used to guide therapy at this time. The measurement is below the desired definitive target range of 15 to 20 mcg/mL.    Plan:  - Continue intermittent dosing regimen of Vancomycin 750 mg IV once.  - Vancomycin Random due on 9/19 at 1300    Liana Gonsalves, EdwinD 09/18/2024 12:13 PM

## 2024-09-18 NOTE — CONSULTS
INPATIENT NEPHROLOGY CONSULT   [facility] NEPHROLOGY    João Ham  09/18/2024    Reason for consultation:    esrd    Chief Complaint:   Chief Complaint   Patient presents with    Fatigue    Groin Swelling     X 2 days.  Hx of Kidney failure and CHF          History of Present Illness:     Per H and P    Patient is a 44 year old male with history of ESDR on dialysis MWF, awaiting kidney transplant, HTN, presented to ED with 3 days history of left groin pain and swelling. States swelling comes every time he cough and is being painful. Patient has low grade fever 99s at home. He has been vomiting bilious fluid last 2-3 days. No diarrhea or abdominal pain.      In the ED CT abdomen showed Left inguinal hernia containing fat and air as well as marked inflammation extending from inside the pelvis, in the hernia and down into the left scrotum. This is consistent with an infected inguinal hernia, possible gangrene. WBC was 14, patient was tachycardic. General surgery was consulted and patient was admitted under hospitalist.       Plan of Care:       Assessment:    esrd  --continue dialysis per routine  --fluid restrict  --renal dose medication per routine  --continue outpt medication  --continue binders with meals    Anemia  --erythropoiesis stimulating agent with renal replacement therapy    Hyperphosphatemia  --renal diet  --continue binders    Hypertension  --uf with hd  --fluid restrict  --low salt diet  --continue home medication    Hyperkalemia  --2k dialysate  --low k diet         Thank you for allowing us to participate in this patient's care. We will continue to follow.    Vital Signs:  Temp Readings from Last 3 Encounters:   09/18/24 98.5 °F (36.9 °C) (Oral)   08/22/24 97.3 °F (36.3 °C) (Temporal)   07/30/24 98.6 °F (37 °C) (Oral)       Pulse Readings from Last 3 Encounters:   09/18/24 92   08/29/24 (!) 115   08/22/24 110       BP Readings from Last 3 Encounters:   09/18/24 (!) 98/58   08/29/24 99/68   08/22/24  (!) 137/91       Weight:  Wt Readings from Last 3 Encounters:   09/18/24 134 kg (295 lb 6.7 oz)   08/29/24 131.3 kg (289 lb 7.4 oz)   08/22/24 131 kg (288 lb 12.8 oz)       Past Medical & Surgical History:  Past Medical History:   Diagnosis Date    Allergy     Anemia     Anxiety     CHF (congestive heart failure)     Depression     High blood pressure with chronic kidney disease, stage 5 chronic kidney disease or end stage renal disease     Hypertension        Past Surgical History:   Procedure Laterality Date    ABSCESS DRAINAGE Left 9/17/2024    Procedure: DRAINAGE, ABSCESS, GROIN;  Surgeon: Galileo Connors MD;  Location: [facility];  Service: General;  Laterality: Left;    FISTULOGRAM Bilateral 4/30/2024    Procedure: Fistulogram;  Surgeon: Khoobehi, Ali, MD;  Location: [facility] CATH/EP LAB;  Service: Vascular;  Laterality: Bilateral;    HERNIA REPAIR Right     With mesh    INSERTION, CATHETER, TUNNELED N/A 6/22/2023    Procedure: Insertion,catheter,tunneled;  Surgeon: Everett Caicedo MD;  Location: [facility] OR;  Service: General;  Laterality: N/A;    PERCUTANEOUS TRANSLUMINAL ANGIOPLASTY OF ARTERIOVENOUS FISTULA Left 4/30/2024    Procedure: PTA, AV FISTULA;  Surgeon: Khoobehi, Ali, MD;  Location: [facility] CATH/EP LAB;  Service: Vascular;  Laterality: Left;    PHLEBOGRAPHY N/A 7/19/2024    Procedure: Venogram;  Surgeon: Khoobehi, Ali, MD;  Location: [facility] CATH/EP LAB;  Service: Vascular;  Laterality: N/A;    REMOVAL, TUNNELED CATH Right 7/19/2024    Procedure: REMOVAL, TUNNELED CATH;  Surgeon: Khoobehi, Ali, MD;  Location: [facility] CATH/EP LAB;  Service: Vascular;  Laterality: Right;    ROBOT-ASSISTED LAPAROSCOPIC RESECTION OF SIGMOID COLON USING DA DELANO XI N/A 9/17/2024    Procedure: XI ROBOTIC SIGMOID RESECTION, WITH ANASTAMOSIS;  Surgeon: Galileo Connors MD;  Location: 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     Social Determinants of Health     Financial Resource Strain: Low Risk  (7/18/2024)    Overall Financial Resource Strain (CARDIA)     Difficulty of Paying Living Expenses: Not very hard   Food Insecurity: No Food Insecurity (7/18/2024)    Hunger Vital Sign     Worried About Running Out of Food in the Last Year: Never true     Ran Out of Food in the Last Year: Never true   Transportation Needs: No Transportation Needs (7/18/2024)    TRANSPORTATION NEEDS     Transportation : No   Physical Activity: Sufficiently Active (1/3/2023)    Exercise Vital Sign     Days of Exercise per Week: 6 days     Minutes of Exercise per Session: 60 min   Recent Concern: Physical Activity - Insufficiently Active (10/11/2022)    Exercise Vital Sign     Days of Exercise per Week: 3 days     Minutes of Exercise per Session: 30 min   Stress: Stress Concern Present (7/18/2024)    [employer] [place] [employer] - Occupational Stress Questionnaire     Feeling of Stress : Very much   Housing Stability: Low Risk  (7/18/2024)    Housing Stability Vital Sign     Unable to Pay for Housing in the Last Year: No     Homeless in the Last Year: No       Medications:  No current facility-administered medications on file prior to encounter.     Current Outpatient Medications on File Prior to Encounter   Medication Sig Dispense Refill    apixaban (ELIQUIS) 2.5 mg Tab Take 2.5 mg by mouth 2 (two) times daily.      calcitRIOL (ROCALTROL) 0.25 MCG Cap Take 1 capsule by mouth once daily (Patient taking differently: Take 0.25 mcg by mouth once daily.) 90 capsule 1    cholecalciferol, vitamin D3, 125 mcg (5,000 unit) Tab Take 1 tablet by mouth once daily.      cloNIDine  (CATAPRES) 0.3 MG tablet TAKE 1 TABLET BY MOUTH THREE TIMES DAILY 270 tablet 2    isosorbide mononitrate (IMDUR) 120 MG 24 hr tablet Take 120 mg by mouth once daily.      metoprolol succinate (TOPROL-XL) 50 MG 24 hr tablet Take 150 mg by mouth once daily.      olmesartan (BENICAR) 40 MG tablet Take 1 tablet by mouth once daily 90 tablet 0    sevelamer carbonate (RENVELA) 800 mg Tab Take 2 tablets (1,600 mg total) by mouth 3 (three) times daily with meals. 180 tablet 11    calcium carbonate (TUMS) 200 mg calcium (500 mg) chewable tablet Take 2 tablets (1,000 mg total) by mouth 3 (three) times daily with meals. (Patient taking differently: Take 1,000 mg by mouth 3 (three) times daily.) 180 tablet 11    hydrALAZINE (APRESOLINE) 100 MG tablet Take 1 tablet (100 mg total) by mouth 3 (three) times daily. 90 tablet 11     Scheduled Meds:   acetaminophen  1,000 mg Intravenous Q8H    bisacodyL  5 mg Oral QHS    ceFEPime IV (PEDS and ADULTS)  2 g Intravenous Q24H    clindamycin in D5W  900 mg Intravenous Q6H    cloNIDine  0.3 mg Oral TID    epoetin mily-epbx  10,000 Units Intravenous Once    gabapentin  200 mg Oral BID    heparin (porcine)  7,500 Units Subcutaneous Q8H    hydrALAZINE  100 mg Oral TID    isosorbide mononitrate  120 mg Oral Daily    metoprolol succinate  150 mg Oral Daily    mupirocin   Nasal BID    senna-docusate 8.6-50 mg  1 tablet Oral Daily    sevelamer carbonate  1,600 mg Oral TID WM    valsartan  320 mg Oral Daily    vancomycin (VANCOCIN) IV (PEDS and ADULTS)  750 mg Intravenous Once     Continuous Infusions:   dextrose 5% lactated ringers   Intravenous Continuous 125 mL/hr at 09/18/24 0242 New Bag at 09/18/24 0242     PRN Meds:.  Current Facility-Administered Medications:     acetaminophen, 650 mg, Oral, Q8H PRN    acetaminophen, 650 mg, Oral, Q4H PRN    aluminum-magnesium hydroxide-simethicone, 30 mL, Oral, QID PRN    dextrose 10%, 12.5 g, Intravenous, PRN    dextrose 10%, 25 g, Intravenous, PRN     "glucagon (human recombinant), 1 mg, Intramuscular, PRN    glucose, 16 g, Oral, PRN    glucose, 24 g, Oral, PRN    HYDROcodone-acetaminophen, 1 tablet, Oral, Q4H PRN    HYDROmorphone, 1 mg, Intravenous, Q4H PRN    insulin aspart U-100, 0-5 Units, Subcutaneous, QID (AC + HS) PRN    magnesium oxide, 800 mg, Oral, PRN    magnesium oxide, 800 mg, Oral, PRN    melatonin, 6 mg, Oral, Nightly PRN    naloxone, 0.02 mg, Intravenous, PRN    ondansetron, 4 mg, Intravenous, Q6H PRN    potassium bicarbonate, 35 mEq, Oral, PRN    potassium bicarbonate, 50 mEq, Oral, PRN    potassium bicarbonate, 60 mEq, Oral, PRN    potassium, sodium phosphates, 2 packet, Oral, PRN    potassium, sodium phosphates, 2 packet, Oral, PRN    potassium, sodium phosphates, 2 packet, Oral, PRN    sodium chloride 0.9%, 3 mL, Intravenous, Q12H PRN    Pharmacy to dose Vancomycin consult, , , Once **AND** vancomycin - pharmacy to dose, , Intravenous, pharmacy to manage frequency    Allergies:  Mushroom    Past Family History:  Reviewed; refer to Hospitalist Admission Note    Review of Systems:  Review of Systems - All 14 systems reviewed and negative, except as noted in HPI    Physical Exam:    BP (!) 98/58 (BP Location: Right arm, Patient Position: Lying)   Pulse 92   Temp 98.5 °F (36.9 °C) (Oral)   Resp 18   Ht 6' 2" (1.88 m)   Wt 134 kg (295 lb 6.7 oz)   SpO2 95%   BMI 37.93 kg/m²     General Appearance:    Alert, cooperative, no distress, appears stated age   Head:    Normocephalic, without obvious abnormality, atraumatic   Eyes:    PER, conjunctiva/corneas clear, EOM's intact in both eyes        Throat:   Lips, mucosa, and tongue normal; teeth and gums normal   Back:     Symmetric, no curvature, ROM normal, no CVA tenderness   Lungs:     Clear to auscultation bilaterally, respirations unlabored   Chest wall:    No tenderness or deformity   Heart:    Regular rate and rhythm, S1 and S2 normal, no murmur, rub   or gallop   Abdomen:     Soft, " non-tender, bowel sounds active all four quadrants,     no masses, no organomegaly   Extremities:   Extremities normal, atraumatic, no cyanosis or edema   Pulses:   2+ and symmetric all extremities   MSK:   No joint or muscle swelling, tenderness or deformity   Skin:   Skin color, texture, turgor normal, no rashes or lesions   Neurologic:   CNII-XII intact, normal strength and sensation       Throughout.  No flap     Results:  Lab Results   Component Value Date     (L) 09/18/2024    K 6.2 (HH) 09/18/2024    CL 93 (L) 09/18/2024    CO2 21 (L) 09/18/2024    BUN 47 (H) 09/18/2024    CREATININE 16.2 (H) 09/18/2024    CALCIUM 8.3 (L) 09/18/2024    ANIONGAP 20 (H) 09/18/2024       Lab Results   Component Value Date    CALCIUM 8.3 (L) 09/18/2024    PHOS 5.4 (H) 06/28/2023       Recent Labs   Lab 09/18/24  0430   WBC 9.12   RBC 2.36*   HGB 7.7*   HCT 23.9*      *   MCH 32.6*   MCHC 32.2          I have personally reviewed pertinent radiological imaging and reports.    Mariano Hickey MD  [facility] [place]  [phone]

## 2024-09-18 NOTE — PROGRESS NOTES
[facility]  Progress Note    Patient Name: João Ham  MRN: [id number]  Patient Class: IP- Inpatient   Admission Date: 9/17/2024  Length of Stay: 1 days  Attending Physician: Jhon Quinn MD  Primary Care Provider: Dawson Granado MD        Subjective:     Principal Problem:Inguinal hernia of left side with gangrene        HPI:  Patient is a 44 year old male with history of ESDR on dialysis MWF, awaiting kidney transplant, HTN, presented to ED with 3 days history of left groin pain and swelling. States swelling comes every time he cough and is being painful. Patient has low grade fever 99s at home. He has been vomiting bilious fluid last 2-3 days. No diarrhea or abdominal pain.     In the ED CT abdomen showed Left inguinal hernia containing fat and air as well as marked inflammation extending from inside the pelvis, in the hernia and down into the left scrotum. This is consistent with an infected inguinal hernia, possible gangrene. WBC was 14, patient was tachycardic. General surgery was consulted and patient was admitted under hospitalist.     Overview/Hospital Course:  Patient is a 44 year old male with history of ESRD, was admitted with suspected inguinal hernia gangrene. General surgery was consulted and patient was taken to OR. Found to have colonic perforation due to mesh erosion with an abscess. Patient underwent sigmoid resection and drainage of the abscess. Patient was started on clindamycin, Vancomycin and cefepime. Nephrology was consulted for chronic dialysis.     Interval History: Patient states he is in pain. She hs not feeling nauseous anymore. He is afebrile. Due for dialysis today.     Review of Systems  Objective:     Vital Signs (Most Recent):  Temp: 98.5 °F (36.9 °C) (09/18/24 1221)  Pulse: 92 (09/18/24 1221)  Resp: 18 (09/18/24 1221)  BP: (!) 98/58 (09/18/24 1221)  SpO2: 95 % (09/18/24 1221) Vital Signs (24h Range):  Temp:  [97.9 °F (36.6  °C)-99.1 °F (37.3 °C)] 98.5 °F (36.9 °C)  Pulse:  [] 92  Resp:  [13-26] 18  SpO2:  [89 %-99 %] 95 %  BP: ()/(54-96) 98/58  Arterial Line BP: (115)/(72) 115/72     Weight: 134 kg (295 lb 6.7 oz)  Body mass index is 37.93 kg/m².    Intake/Output Summary (Last 24 hours) at 9/18/2024 1238  Last data filed at 9/18/2024 0644  Gross per 24 hour   Intake 2289.82 ml   Output 120 ml   Net 2169.82 ml         Physical Exam  Vitals and nursing note reviewed.   Constitutional:       General: He is not in acute distress.     Appearance: He is obese. He is not toxic-appearing.   HENT:      Head: Atraumatic.      Mouth/Throat:      Mouth: Mucous membranes are moist.      Pharynx: Oropharynx is clear.   Eyes:      General: No scleral icterus.     Conjunctiva/sclera: Conjunctivae normal.      Pupils: Pupils are equal, round, and reactive to light.   Cardiovascular:      Rate and Rhythm: Regular rhythm.      Heart sounds: No murmur heard.  Pulmonary:      Effort: No respiratory distress.      Breath sounds: No wheezing, rhonchi or rales.   Abdominal:      General: Abdomen is flat. Bowel sounds are normal.      Palpations: Abdomen is soft.   Genitourinary:     Comments: There is left groin dressing and also laparoscopic scars in the abdomen   Musculoskeletal:         General: No swelling or deformity.      Cervical back: No rigidity or tenderness.   Skin:     Coloration: Skin is not jaundiced or pale.      Findings: No bruising, erythema or rash.   Neurological:      General: No focal deficit present.      Mental Status: He is alert and oriented to person, place, and time.      Cranial Nerves: No cranial nerve deficit.      Sensory: No sensory deficit.      Motor: No weakness.   Psychiatric:         Mood and Affect: Mood normal.         Behavior: Behavior normal.     Significant Labs: All pertinent labs within the past 24 hours have been reviewed.  CBC:   Recent Labs   Lab 09/17/24  0851 09/18/24  0430   WBC 14.64* 9.12    HGB 8.7* 7.7*   HCT 27.2* 23.9*    255     CMP:   Recent Labs   Lab 09/17/24  0851 09/18/24  0429    134*   K 4.4 6.2*   CL 92* 93*   CO2 27 21*   * 121*   BUN 36* 47*   CREATININE 14.5* 16.2*   CALCIUM 9.2 8.3*   PROT 8.6* 7.3   ALBUMIN 3.2* 2.5*   BILITOT 1.2* 0.8   ALKPHOS 72 63   AST 14 16   ALT 10 9*   ANIONGAP 22* 20*       Significant Imaging: I have reviewed all pertinent imaging results/findings within the past 24 hours.    Assessment/Plan:      * Inguinal hernia of left side with gangrene  Colonic perforation with abscess  CT abdomen shows Left inguinal hernia containing fat and air as well as marked inflammation extending from inside the pelvis, in the hernia and down into the left scrotum. This is consistent with an infected inguinal hernia, possible gangrene.   Patient was taken to OR and found to have colonic perforation due to mesh erosion. Patient underwent sigmoid resection and I&D on 9/17  - Cont Vancomycin, cefepime and Clindamycin  - diet per general surgery   - follow blood cultures and OR culture       Essential hypertension  Chronic, controlled. Latest blood pressure and vitals reviewed-     Home meds for hypertension were reviewed and noted below.   Hypertension Medications               cloNIDine (CATAPRES) 0.3 MG tablet TAKE 1 TABLET BY MOUTH THREE TIMES DAILY    hydrALAZINE (APRESOLINE) 100 MG tablet Take 1 tablet (100 mg total) by mouth 3 (three) times daily.    isosorbide mononitrate (IMDUR) 120 MG 24 hr tablet Take 120 mg by mouth once daily.    metoprolol succinate (TOPROL-XL) 50 MG 24 hr tablet Take 150 mg by mouth once daily.    olmesartan (BENICAR) 40 MG tablet Take 1 tablet by mouth once daily            While in the hospital, will manage blood pressure as follows; Continue home antihypertensive regimen    Will utilize p.r.n. blood pressure medication only if patient's blood pressure greater than 140/90 and he develops symptoms such as worsening chest pain or  shortness of breath.    Sleep apnea  CPAP per home settings       Pulmonary hypertension  Resume home meds       ESRD (end stage renal disease)  Nephrology consulted for routine dialysis MWF  Renal dose medications       VTE Risk Mitigation (From admission, onward)           Ordered     heparin (porcine) injection 7,500 Units  Every 8 hours         09/17/24 1031     IP VTE HIGH RISK PATIENT  Once         09/17/24 1031     Place sequential compression device  Until discontinued         09/17/24 1031                    Discharge Planning   MARIA ISABEL: 9/20/2024     Code Status: Full Code   Is the patient medically ready for discharge?:     Reason for patient still in hospital (select all that apply): Treatment  Discharge Plan A: Home with family                  Jhon Quinn MD  Department of Hospital Medicine   [facility]/Surg

## 2024-09-18 NOTE — ASSESSMENT & PLAN NOTE
Colonic perforation with abscess  CT abdomen shows Left inguinal hernia containing fat and air as well as marked inflammation extending from inside the pelvis, in the hernia and down into the left scrotum. This is consistent with an infected inguinal hernia, possible gangrene.   Patient was taken to OR and found to have colonic perforation due to mesh erosion. Patient underwent sigmoid resection and I&D on 9/17  - Cont Vancomycin, cefepime and Clindamycin  - diet per general surgery   - follow blood cultures and OR culture

## 2024-09-19 PROBLEM — T82.590A DIALYSIS AV FISTULA MALFUNCTION: Status: ACTIVE | Noted: 2024-09-19

## 2024-09-19 LAB
ALBUMIN SERPL BCP-MCNC: 2.2 G/DL (ref 3.5–5.2)
ALP SERPL-CCNC: 84 U/L (ref 55–135)
ALT SERPL W/O P-5'-P-CCNC: 14 U/L (ref 10–44)
ANION GAP SERPL CALC-SCNC: 18 MMOL/L (ref 8–16)
AST SERPL-CCNC: 33 U/L (ref 10–40)
BACTERIA #/AREA URNS HPF: ABNORMAL /HPF
BASOPHILS # BLD AUTO: 0.03 K/UL (ref 0–0.2)
BASOPHILS NFR BLD: 0.3 % (ref 0–1.9)
BILIRUB SERPL-MCNC: 0.5 MG/DL (ref 0.1–1)
BILIRUB UR QL STRIP: NEGATIVE
BLD PROD TYP BPU: NORMAL
BLD PROD TYP BPU: NORMAL
BLOOD UNIT EXPIRATION DATE: NORMAL
BLOOD UNIT EXPIRATION DATE: NORMAL
BLOOD UNIT TYPE CODE: 6200
BLOOD UNIT TYPE CODE: 6200
BLOOD UNIT TYPE: NORMAL
BLOOD UNIT TYPE: NORMAL
BUN SERPL-MCNC: 56 MG/DL (ref 6–20)
CALCIUM SERPL-MCNC: 8.3 MG/DL (ref 8.7–10.5)
CHLORIDE SERPL-SCNC: 91 MMOL/L (ref 95–110)
CLARITY UR: CLEAR
CO2 SERPL-SCNC: 26 MMOL/L (ref 23–29)
CODING SYSTEM: NORMAL
CODING SYSTEM: NORMAL
COLOR UR: YELLOW
CREAT SERPL-MCNC: 17.6 MG/DL (ref 0.5–1.4)
CROSSMATCH INTERPRETATION: NORMAL
CROSSMATCH INTERPRETATION: NORMAL
DIFFERENTIAL METHOD BLD: ABNORMAL
DISPENSE STATUS: NORMAL
DISPENSE STATUS: NORMAL
EOSINOPHIL # BLD AUTO: 0.1 K/UL (ref 0–0.5)
EOSINOPHIL NFR BLD: 1.2 % (ref 0–8)
ERYTHROCYTE [DISTWIDTH] IN BLOOD BY AUTOMATED COUNT: 16 % (ref 11.5–14.5)
EST. GFR  (NO RACE VARIABLE): 3 ML/MIN/1.73 M^2
GLUCOSE SERPL-MCNC: 114 MG/DL (ref 70–110)
GLUCOSE UR QL STRIP: NEGATIVE
HCT VFR BLD AUTO: 20.7 % (ref 40–54)
HGB BLD-MCNC: 6.6 G/DL (ref 14–18)
HGB UR QL STRIP: ABNORMAL
HYALINE CASTS #/AREA URNS LPF: 0 /LPF
IMM GRANULOCYTES # BLD AUTO: 0.11 K/UL (ref 0–0.04)
IMM GRANULOCYTES NFR BLD AUTO: 1 % (ref 0–0.5)
KETONES UR QL STRIP: NEGATIVE
LEUKOCYTE ESTERASE UR QL STRIP: ABNORMAL
LYMPHOCYTES # BLD AUTO: 1 K/UL (ref 1–4.8)
LYMPHOCYTES NFR BLD: 9 % (ref 18–48)
MAGNESIUM SERPL-MCNC: 1.8 MG/DL (ref 1.6–2.6)
MCH RBC QN AUTO: 32.8 PG (ref 27–31)
MCHC RBC AUTO-ENTMCNC: 31.9 G/DL (ref 32–36)
MCV RBC AUTO: 103 FL (ref 82–98)
MICROSCOPIC COMMENT: ABNORMAL
MONOCYTES # BLD AUTO: 1.7 K/UL (ref 0.3–1)
MONOCYTES NFR BLD: 15.8 % (ref 4–15)
NEUTROPHILS # BLD AUTO: 7.8 K/UL (ref 1.8–7.7)
NEUTROPHILS NFR BLD: 72.7 % (ref 38–73)
NITRITE UR QL STRIP: NEGATIVE
NRBC BLD-RTO: 0 /100 WBC
NUM UNITS TRANS PACKED RBC: NORMAL
NUM UNITS TRANS PACKED RBC: NORMAL
PH UR STRIP: 8 [PH] (ref 5–8)
PLATELET # BLD AUTO: 266 K/UL (ref 150–450)
PMV BLD AUTO: 10.1 FL (ref 9.2–12.9)
POCT GLUCOSE: 109 MG/DL (ref 70–110)
POCT GLUCOSE: 127 MG/DL (ref 70–110)
POCT GLUCOSE: 93 MG/DL (ref 70–110)
POTASSIUM SERPL-SCNC: 4.4 MMOL/L (ref 3.5–5.1)
PROT SERPL-MCNC: 6.8 G/DL (ref 6–8.4)
PROT UR QL STRIP: ABNORMAL
RBC # BLD AUTO: 2.01 M/UL (ref 4.6–6.2)
RBC #/AREA URNS HPF: 4 /HPF (ref 0–4)
SODIUM SERPL-SCNC: 135 MMOL/L (ref 136–145)
SP GR UR STRIP: 1.01 (ref 1–1.03)
SQUAMOUS #/AREA URNS HPF: 1 /HPF
UNIDENT CRYS URNS QL MICRO: 2
URN SPEC COLLECT METH UR: ABNORMAL
UROBILINOGEN UR STRIP-ACNC: NEGATIVE EU/DL
WBC # BLD AUTO: 10.77 K/UL (ref 3.9–12.7)
WBC #/AREA URNS HPF: 21 /HPF (ref 0–5)

## 2024-09-19 PROCEDURE — 36430 TRANSFUSION BLD/BLD COMPNT: CPT

## 2024-09-19 PROCEDURE — 30233N1 TRANSFUSION OF NONAUTOLOGOUS RED BLOOD CELLS INTO PERIPHERAL VEIN, PERCUTANEOUS APPROACH: ICD-10-PCS | Performed by: INTERNAL MEDICINE

## 2024-09-19 PROCEDURE — 25000003 PHARM REV CODE 250: Performed by: INTERNAL MEDICINE

## 2024-09-19 PROCEDURE — 25000003 PHARM REV CODE 250: Performed by: SURGERY

## 2024-09-19 PROCEDURE — 99900035 HC TECH TIME PER 15 MIN (STAT)

## 2024-09-19 PROCEDURE — 87340 HEPATITIS B SURFACE AG IA: CPT | Performed by: INTERNAL MEDICINE

## 2024-09-19 PROCEDURE — P9016 RBC LEUKOCYTES REDUCED: HCPCS | Performed by: INTERNAL MEDICINE

## 2024-09-19 PROCEDURE — 86706 HEP B SURFACE ANTIBODY: CPT | Performed by: INTERNAL MEDICINE

## 2024-09-19 PROCEDURE — 83735 ASSAY OF MAGNESIUM: CPT | Performed by: INTERNAL MEDICINE

## 2024-09-19 PROCEDURE — 63600175 PHARM REV CODE 636 W HCPCS: Performed by: INTERNAL MEDICINE

## 2024-09-19 PROCEDURE — 80053 COMPREHEN METABOLIC PANEL: CPT | Performed by: INTERNAL MEDICINE

## 2024-09-19 PROCEDURE — 94761 N-INVAS EAR/PLS OXIMETRY MLT: CPT

## 2024-09-19 PROCEDURE — 36415 COLL VENOUS BLD VENIPUNCTURE: CPT | Performed by: INTERNAL MEDICINE

## 2024-09-19 PROCEDURE — 86920 COMPATIBILITY TEST SPIN: CPT | Performed by: INTERNAL MEDICINE

## 2024-09-19 PROCEDURE — 81000 URINALYSIS NONAUTO W/SCOPE: CPT | Performed by: INTERNAL MEDICINE

## 2024-09-19 PROCEDURE — 02HV33Z INSERTION OF INFUSION DEVICE INTO SUPERIOR VENA CAVA, PERCUTANEOUS APPROACH: ICD-10-PCS | Performed by: SURGERY

## 2024-09-19 PROCEDURE — 63600175 PHARM REV CODE 636 W HCPCS: Mod: JZ,JG | Performed by: INTERNAL MEDICINE

## 2024-09-19 PROCEDURE — 85025 COMPLETE CBC W/AUTO DIFF WBC: CPT | Performed by: INTERNAL MEDICINE

## 2024-09-19 PROCEDURE — 86920 COMPATIBILITY TEST SPIN: CPT | Performed by: NURSE PRACTITIONER

## 2024-09-19 PROCEDURE — 63600175 PHARM REV CODE 636 W HCPCS: Performed by: SURGERY

## 2024-09-19 PROCEDURE — 12000002 HC ACUTE/MED SURGE SEMI-PRIVATE ROOM

## 2024-09-19 PROCEDURE — 27000221 HC OXYGEN, UP TO 24 HOURS

## 2024-09-19 PROCEDURE — 5A09557 ASSISTANCE WITH RESPIRATORY VENTILATION, GREATER THAN 96 CONSECUTIVE HOURS, CONTINUOUS POSITIVE AIRWAY PRESSURE: ICD-10-PCS | Performed by: SURGERY

## 2024-09-19 PROCEDURE — 94660 CPAP INITIATION&MGMT: CPT

## 2024-09-19 PROCEDURE — 87086 URINE CULTURE/COLONY COUNT: CPT | Performed by: INTERNAL MEDICINE

## 2024-09-19 PROCEDURE — P9016 RBC LEUKOCYTES REDUCED: HCPCS | Performed by: NURSE PRACTITIONER

## 2024-09-19 PROCEDURE — 90935 HEMODIALYSIS ONE EVALUATION: CPT

## 2024-09-19 RX ORDER — HYDROCODONE BITARTRATE AND ACETAMINOPHEN 500; 5 MG/1; MG/1
TABLET ORAL
Status: DISCONTINUED | OUTPATIENT
Start: 2024-09-19 | End: 2024-09-24

## 2024-09-19 RX ORDER — SODIUM CHLORIDE 9 MG/ML
INJECTION, SOLUTION INTRAVENOUS
Status: CANCELLED | OUTPATIENT
Start: 2024-09-19

## 2024-09-19 RX ORDER — SODIUM CHLORIDE 9 MG/ML
INJECTION, SOLUTION INTRAVENOUS ONCE
Status: CANCELLED | OUTPATIENT
Start: 2024-09-19 | End: 2024-09-19

## 2024-09-19 RX ORDER — HEPARIN SODIUM 1000 [USP'U]/ML
4000 INJECTION, SOLUTION INTRAVENOUS; SUBCUTANEOUS
Status: DISCONTINUED | OUTPATIENT
Start: 2024-09-19 | End: 2024-09-20

## 2024-09-19 RX ADMIN — CLINDAMYCIN IN 5 PERCENT DEXTROSE 900 MG: 18 INJECTION, SOLUTION INTRAVENOUS at 09:09

## 2024-09-19 RX ADMIN — HEPARIN SODIUM 4000 UNITS: 1000 INJECTION, SOLUTION INTRAVENOUS; SUBCUTANEOUS at 07:09

## 2024-09-19 RX ADMIN — HYDRALAZINE HYDROCHLORIDE 100 MG: 25 TABLET ORAL at 09:09

## 2024-09-19 RX ADMIN — CEFEPIME 2 G: 2 INJECTION, POWDER, FOR SOLUTION INTRAVENOUS at 11:09

## 2024-09-19 RX ADMIN — SENNOSIDES AND DOCUSATE SODIUM 1 TABLET: 8.6; 5 TABLET ORAL at 09:09

## 2024-09-19 RX ADMIN — METOPROLOL SUCCINATE 150 MG: 50 TABLET, FILM COATED, EXTENDED RELEASE ORAL at 09:09

## 2024-09-19 RX ADMIN — CLINDAMYCIN IN 5 PERCENT DEXTROSE 900 MG: 18 INJECTION, SOLUTION INTRAVENOUS at 01:09

## 2024-09-19 RX ADMIN — SEVELAMER CARBONATE 1600 MG: 800 TABLET, FILM COATED ORAL at 09:09

## 2024-09-19 RX ADMIN — SODIUM ZIRCONIUM CYCLOSILICATE 10 G: 10 POWDER, FOR SUSPENSION ORAL at 10:09

## 2024-09-19 RX ADMIN — HEPARIN SODIUM 7500 UNITS: 5000 INJECTION INTRAVENOUS; SUBCUTANEOUS at 10:09

## 2024-09-19 RX ADMIN — HEPARIN SODIUM 7500 UNITS: 5000 INJECTION INTRAVENOUS; SUBCUTANEOUS at 05:09

## 2024-09-19 RX ADMIN — MUPIROCIN 1 G: 20 OINTMENT TOPICAL at 09:09

## 2024-09-19 RX ADMIN — CLINDAMYCIN IN 5 PERCENT DEXTROSE 900 MG: 18 INJECTION, SOLUTION INTRAVENOUS at 03:09

## 2024-09-19 RX ADMIN — HEPARIN SODIUM 7500 UNITS: 5000 INJECTION INTRAVENOUS; SUBCUTANEOUS at 02:09

## 2024-09-19 RX ADMIN — SODIUM CHLORIDE, SODIUM LACTATE, POTASSIUM CHLORIDE, CALCIUM CHLORIDE AND DEXTROSE MONOHYDRATE: 5; 600; 310; 30; 20 INJECTION, SOLUTION INTRAVENOUS at 02:09

## 2024-09-19 RX ADMIN — CLINDAMYCIN IN 5 PERCENT DEXTROSE 900 MG: 18 INJECTION, SOLUTION INTRAVENOUS at 10:09

## 2024-09-19 RX ADMIN — GABAPENTIN 200 MG: 100 CAPSULE ORAL at 10:09

## 2024-09-19 RX ADMIN — CLONIDINE HYDROCHLORIDE 0.3 MG: 0.1 TABLET ORAL at 09:09

## 2024-09-19 RX ADMIN — VALSARTAN 320 MG: 80 TABLET, FILM COATED ORAL at 09:09

## 2024-09-19 RX ADMIN — ISOSORBIDE MONONITRATE 120 MG: 60 TABLET, EXTENDED RELEASE ORAL at 09:09

## 2024-09-19 RX ADMIN — SODIUM ZIRCONIUM CYCLOSILICATE 10 G: 10 POWDER, FOR SUSPENSION ORAL at 09:09

## 2024-09-19 RX ADMIN — MUPIROCIN 1 G: 20 OINTMENT TOPICAL at 10:09

## 2024-09-19 RX ADMIN — GABAPENTIN 200 MG: 100 CAPSULE ORAL at 09:09

## 2024-09-19 NOTE — PROGRESS NOTES
[facility]  Progress Note    Patient Name: João Ham  MRN: [id number]  Patient Class: IP- Inpatient   Admission Date: 9/17/2024  Length of Stay: 2 days  Attending Physician: Jhon Quinn MD  Primary Care Provider: Dawson Granado MD        Subjective:     Principal Problem:Inguinal hernia of left side with gangrene        HPI:  Patient is a 44 year old male with history of ESDR on dialysis MWF, awaiting kidney transplant, HTN, presented to ED with 3 days history of left groin pain and swelling. States swelling comes every time he cough and is being painful. Patient has low grade fever 99s at home. He has been vomiting bilious fluid last 2-3 days. No diarrhea or abdominal pain.     In the ED CT abdomen showed Left inguinal hernia containing fat and air as well as marked inflammation extending from inside the pelvis, in the hernia and down into the left scrotum. This is consistent with an infected inguinal hernia, possible gangrene. WBC was 14, patient was tachycardic. General surgery was consulted and patient was admitted under hospitalist.     Overview/Hospital Course:  Patient is a 44 year old male with history of ESRD, was admitted with suspected inguinal hernia gangrene. General surgery was consulted and patient was taken to OR. Found to have colonic perforation due to mesh erosion with an abscess. Patient underwent sigmoid resection and drainage of the abscess. Patient was started on clindamycin, Vancomycin and cefepime. Nephrology was consulted for chronic dialysis. However patient's AVF was not functioning. General surgery consulted again and trialysis catheter being placed today. Patient is getting dialysis and transfusion during dialysis for low Hb 6.6 and being transferred to [facility] for vascular evaluation.     Interval History: Patient states he is uncomfortable. Still has pain in the groin area and also states his testicles are swollen. His Hb  dropped to 6.6, transfusion planned during dialysis today.     Review of Systems  Objective:     Vital Signs (Most Recent):  Temp: 98.3 °F (36.8 °C) (09/19/24 0800)  Pulse: 95 (09/19/24 1354)  Resp: 16 (09/19/24 1354)  BP: 129/66 (09/19/24 0903)  SpO2: (!) 92 % (09/19/24 0800) Vital Signs (24h Range):  Temp:  [97.5 °F (36.4 °C)-98.3 °F (36.8 °C)] 98.3 °F (36.8 °C)  Pulse:  [] 95  Resp:  [16-20] 16  SpO2:  [92 %-100 %] 92 %  BP: ()/(56-72) 129/66     Weight: 134 kg (295 lb 6.7 oz)  Body mass index is 37.93 kg/m².    Intake/Output Summary (Last 24 hours) at 9/19/2024 1405  Last data filed at 9/19/2024 0549  Gross per 24 hour   Intake 7947.98 ml   Output 10 ml   Net 7937.98 ml      Physical Exam  Vitals and nursing note reviewed.   Constitutional:       General: He is not in acute distress.     Appearance: He is obese. He is not toxic-appearing.   HENT:      Head: Atraumatic.      Mouth/Throat:      Mouth: Mucous membranes are moist.      Pharynx: Oropharynx is clear.   Eyes:      General: No scleral icterus.     Conjunctiva/sclera: Conjunctivae normal.      Pupils: Pupils are equal, round, and reactive to light.   Cardiovascular:      Rate and Rhythm: Regular rhythm.      Heart sounds: No murmur heard.  Pulmonary:      Effort: No respiratory distress.      Breath sounds: No wheezing, rhonchi or rales.   Abdominal:      General: Abdomen is flat. Bowel sounds are normal.      Palpations: Abdomen is soft.   Genitourinary:     Comments: There is left groin dressing and also laparoscopic scars in the abdomen   Musculoskeletal:         General: No swelling or deformity.      Cervical back: No rigidity or tenderness.   Skin:     Coloration: Skin is not jaundiced or pale.      Findings: No bruising, erythema or rash.   Neurological:      General: No focal deficit present.      Mental Status: He is alert and oriented to person, place, and time.      Cranial Nerves: No cranial nerve deficit.      Sensory: No sensory  deficit.      Motor: No weakness.   Psychiatric:         Mood and Affect: Mood normal.         Behavior: Behavior normal    Significant Labs: All pertinent labs within the past 24 hours have been reviewed.  CBC:   Recent Labs   Lab 09/18/24  0430 09/19/24  0500   WBC 9.12 10.77   HGB 7.7* 6.6*   HCT 23.9* 20.7*    266     CMP:   Recent Labs   Lab 09/18/24  0429 09/19/24  0500   * 135*   K 6.2* 4.4   CL 93* 91*   CO2 21* 26   * 114*   BUN 47* 56*   CREATININE 16.2* 17.6*   CALCIUM 8.3* 8.3*   PROT 7.3 6.8   ALBUMIN 2.5* 2.2*   BILITOT 0.8 0.5   ALKPHOS 63 84   AST 16 33   ALT 9* 14   ANIONGAP 20* 18*       Significant Imaging: I have reviewed all pertinent imaging results/findings within the past 24 hours.    Assessment/Plan:      * Inguinal hernia of left side with gangrene  Colonic perforation with abscess  CT abdomen shows Left inguinal hernia containing fat and air as well as marked inflammation extending from inside the pelvis, in the hernia and down into the left scrotum. This is consistent with an infected inguinal hernia, possible gangrene.   Patient was taken to OR and found to have colonic perforation due to mesh erosion. Patient underwent sigmoid resection and I&D on 9/17  - Cont Vancomycin, cefepime and Clindamycin  - diet per general surgery   - follow blood cultures and OR culture       Dialysis AV fistula malfunction  Was unable to access AVF on 9/18  Trialysis catheter being placed today  Transfer to [facility] for vascular evaluation       Essential hypertension  Chronic, controlled. Latest blood pressure and vitals reviewed-     Home meds for hypertension were reviewed and noted below.   Hypertension Medications               cloNIDine (CATAPRES) 0.3 MG tablet TAKE 1 TABLET BY MOUTH THREE TIMES DAILY    hydrALAZINE (APRESOLINE) 100 MG tablet Take 1 tablet (100 mg total) by mouth 3 (three) times daily.    isosorbide mononitrate (IMDUR) 120 MG 24 hr tablet Take 120 mg by mouth once daily.     metoprolol succinate (TOPROL-XL) 50 MG 24 hr tablet Take 150 mg by mouth once daily.    olmesartan (BENICAR) 40 MG tablet Take 1 tablet by mouth once daily            While in the hospital, will manage blood pressure as follows; Continue home antihypertensive regimen    Will utilize p.r.n. blood pressure medication only if patient's blood pressure greater than 140/90 and he develops symptoms such as worsening chest pain or shortness of breath.    Sleep apnea  CPAP per home settings       Pulmonary hypertension  Resume home meds       Anemia of chronic disease  Anemia is likely due to chronic disease due to ESRD. Most recent hemoglobin and hematocrit are listed below.  Recent Labs     09/17/24  0851 09/18/24  0430 09/19/24  0500   HGB 8.7* 7.7* 6.6*   HCT 27.2* 23.9* 20.7*     Plan  - Monitor serial CBC: Daily  - Planning to transfuse 1 unit PRBC during dialysis today     ESRD (end stage renal disease)  Nephrology consulted for routine dialysis MWF, AVF is not functional and tiralysis catheter being placed today.   Renal dose medications       VTE Risk Mitigation (From admission, onward)           Ordered     heparin (porcine) injection 7,500 Units  Every 8 hours         09/17/24 1031     IP VTE HIGH RISK PATIENT  Once         09/17/24 1031     Place sequential compression device  Until discontinued         09/17/24 1031                    Discharge Planning   MARIA ISABEL:      Code Status: Full Code   Is the patient medically ready for discharge?:     Reason for patient still in hospital (select all that apply): Treatment  Discharge Plan A: Home with family                  Jhon Quinn MD  Department of Hospital Medicine   [facility]/Surg

## 2024-09-19 NOTE — CONSULTS
INPATIENT NEPHROLOGY CONSULT   [facility] NEPHROLOGY    João Ham  09/19/2024    Reason for consultation:    esrd    Chief Complaint:   Chief Complaint   Patient presents with    Fatigue    Groin Swelling     X 2 days.  Hx of Kidney failure and CHF          History of Present Illness:     Per H and P    Patient is a 44 year old male with history of ESDR on dialysis MWF, awaiting kidney transplant, HTN, presented to ED with 3 days history of left groin pain and swelling. States swelling comes every time he cough and is being painful. Patient has low grade fever 99s at home. He has been vomiting bilious fluid last 2-3 days. No diarrhea or abdominal pain.      In the ED CT abdomen showed Left inguinal hernia containing fat and air as well as marked inflammation extending from inside the pelvis, in the hernia and down into the left scrotum. This is consistent with an infected inguinal hernia, possible gangrene. WBC was 14, patient was tachycardic. General surgery was consulted and patient was admitted under hospitalist.     9/19  avf nonfunctional.   No sob or chest pain.  Has post op pain.  AFVSS      Plan of Care:       Assessment:    esrd  --continue dialysis per routine  --fluid restrict  --renal dose medication per routine  --continue outpt medication  --continue binders with meals    Anemia  --erythropoiesis stimulating agent with renal replacement therapy    Hyperphosphatemia  --renal diet  --continue binders    Hypertension  --uf with hd  --fluid restrict  --low salt diet  --continue home medication    Hyperkalemia  --2k dialysate  --low k diet    AVF malfunction  --needs temporary line.    --once placed can dialyze and consider transfer to [facility] for vascular surgery evaluation of AVF.  Pt requesting switching vascular surgery to Dr. Jayde Downey.         Thank you for allowing us to participate in this patient's care. We will continue to follow.    Vital Signs:  Temp Readings from Last 3 Encounters:    09/19/24 98.3 °F (36.8 °C) (Oral)   08/22/24 97.3 °F (36.3 °C) (Temporal)   07/30/24 98.6 °F (37 °C) (Oral)       Pulse Readings from Last 3 Encounters:   09/19/24 97   08/29/24 (!) 115   08/22/24 110       BP Readings from Last 3 Encounters:   09/19/24 129/66   08/29/24 99/68   08/22/24 (!) 137/91       Weight:  Wt Readings from Last 3 Encounters:   09/18/24 134 kg (295 lb 6.7 oz)   08/29/24 131.3 kg (289 lb 7.4 oz)   08/22/24 131 kg (288 lb 12.8 oz)       Past Medical & Surgical History:  Past Medical History:   Diagnosis Date    Allergy     Anemia     Anxiety     CHF (congestive heart failure)     Depression     High blood pressure with chronic kidney disease, stage 5 chronic kidney disease or end stage renal disease     Hypertension        Past Surgical History:   Procedure Laterality Date    ABSCESS DRAINAGE Left 9/17/2024    Procedure: DRAINAGE, ABSCESS, GROIN;  Surgeon: Galileo Connors MD;  Location: [facility] OR;  Service: General;  Laterality: Left;    FISTULOGRAM Bilateral 4/30/2024    Procedure: Fistulogram;  Surgeon: Khoobehi, Ali, MD;  Location: [facility] CATH/EP LAB;  Service: Vascular;  Laterality: Bilateral;    HERNIA REPAIR Right     With mesh    INSERTION, CATHETER, TUNNELED N/A 6/22/2023    Procedure: Insertion,catheter,tunneled;  Surgeon: Everett Caicedo MD;  Location: [facility] OR;  Service: General;  Laterality: N/A;    PERCUTANEOUS TRANSLUMINAL ANGIOPLASTY OF ARTERIOVENOUS FISTULA Left 4/30/2024    Procedure: PTA, AV FISTULA;  Surgeon: Khoobehi, Ali, MD;  Location: [facility] CATH/EP LAB;  Service: Vascular;  Laterality: Left;    PHLEBOGRAPHY N/A 7/19/2024    Procedure: Venogram;  Surgeon: Khoobehi, Ali, MD;  Location: [facility] CATH/EP LAB;  Service: Vascular;  Laterality: N/A;    REMOVAL, TUNNELED CATH Right 7/19/2024    Procedure: REMOVAL, TUNNELED CATH;  Surgeon: Khoobehi, Ali, MD;  Location: [facility] CATH/EP LAB;  Service: Vascular;  Laterality: Right;    ROBOT-ASSISTED LAPAROSCOPIC RESECTION OF SIGMOID COLON  USING DA DELANO XI N/A 9/17/2024    Procedure: XI ROBOTIC SIGMOID RESECTION, WITH ANASTAMOSIS;  Surgeon: Galileo Connors MD;  Location: [facility];  Service: General;  Laterality: N/A;  possible open have instruments available       Past Social History:  Social History     Socioeconomic History    Marital status: Single   Tobacco Use    Smoking status: Never     Passive exposure: Never    Smokeless tobacco: Never   Substance and Sexual Activity    Alcohol use: Never    Drug use: Never    Sexual activity: Not Currently   Social History Narrative    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     Social Determinants of Health     Financial Resource Strain: Low Risk  (9/18/2024)    Overall Financial Resource Strain (CARDIA)     Difficulty of Paying Living Expenses: Not very hard   Food Insecurity: No Food Insecurity (9/18/2024)    Hunger Vital Sign     Worried About Running Out of Food in the Last Year: Never true     Ran Out of Food in the Last Year: Never true   Transportation Needs: No Transportation Needs (9/18/2024)    TRANSPORTATION NEEDS     Transportation : No   Physical Activity: Sufficiently Active (1/3/2023)    Exercise Vital Sign     Days of Exercise per Week: 6 days     Minutes of Exercise per Session: 60 min   Recent Concern: Physical Activity - Insufficiently Active (10/11/2022)    Exercise Vital Sign     Days of Exercise per Week: 3 days     Minutes of Exercise per Session: 30 min   Stress: Stress Concern Present (9/18/2024)    [employer] - Occupational Stress Questionnaire     Feeling of Stress : Very much   Housing Stability: Low Risk  (9/18/2024)    Housing Stability Vital Sign     Unable to Pay for Housing in the Last Year: No     Homeless in the Last Year: No       Medications:  No current facility-administered medications on file prior to encounter.     Current Outpatient Medications on File Prior to Encounter   Medication Sig Dispense Refill    apixaban (ELIQUIS) 2.5 mg Tab Take 2.5 mg  by mouth 2 (two) times daily.      calcitRIOL (ROCALTROL) 0.25 MCG Cap Take 1 capsule by mouth once daily (Patient taking differently: Take 0.25 mcg by mouth once daily.) 90 capsule 1    cholecalciferol, vitamin D3, 125 mcg (5,000 unit) Tab Take 1 tablet by mouth once daily.      cloNIDine (CATAPRES) 0.3 MG tablet TAKE 1 TABLET BY MOUTH THREE TIMES DAILY 270 tablet 2    isosorbide mononitrate (IMDUR) 120 MG 24 hr tablet Take 120 mg by mouth once daily.      metoprolol succinate (TOPROL-XL) 50 MG 24 hr tablet Take 150 mg by mouth once daily.      olmesartan (BENICAR) 40 MG tablet Take 1 tablet by mouth once daily 90 tablet 0    sevelamer carbonate (RENVELA) 800 mg Tab Take 2 tablets (1,600 mg total) by mouth 3 (three) times daily with meals. 180 tablet 11    calcium carbonate (TUMS) 200 mg calcium (500 mg) chewable tablet Take 2 tablets (1,000 mg total) by mouth 3 (three) times daily with meals. (Patient taking differently: Take 1,000 mg by mouth 3 (three) times daily.) 180 tablet 11    hydrALAZINE (APRESOLINE) 100 MG tablet Take 1 tablet (100 mg total) by mouth 3 (three) times daily. 90 tablet 11     Scheduled Meds:   bisacodyL  5 mg Oral QHS    ceFEPime IV (PEDS and ADULTS)  2 g Intravenous Q24H    clindamycin in D5W  900 mg Intravenous Q6H    cloNIDine  0.3 mg Oral TID    epoetin mily-epbx  10,000 Units Intravenous Once    gabapentin  200 mg Oral BID    heparin (porcine)  7,500 Units Subcutaneous Q8H    hydrALAZINE  100 mg Oral TID    isosorbide mononitrate  120 mg Oral Daily    metoprolol succinate  150 mg Oral Daily    mupirocin   Nasal BID    senna-docusate 8.6-50 mg  1 tablet Oral Daily    sevelamer carbonate  1,600 mg Oral TID WM    sodium zirconium cyclosilicate  10 g Oral TID    valsartan  320 mg Oral Daily     Continuous Infusions:      PRN Meds:.  Current Facility-Administered Medications:     0.9%  NaCl infusion (for blood administration), , Intravenous, Q24H PRN    0.9%  NaCl infusion (for blood  "administration), , Intravenous, Q24H PRN    0.9% NaCl, , Intravenous, PRN    acetaminophen, 650 mg, Oral, Q8H PRN    acetaminophen, 650 mg, Oral, Q4H PRN    aluminum-magnesium hydroxide-simethicone, 30 mL, Oral, QID PRN    dextrose 10%, 12.5 g, Intravenous, PRN    dextrose 10%, 12.5 g, Intravenous, PRN    dextrose 10%, 12.5 g, Intravenous, PRN    dextrose 10%, 25 g, Intravenous, PRN    dextrose 10%, 25 g, Intravenous, PRN    dextrose 10%, 25 g, Intravenous, PRN    glucagon (human recombinant), 1 mg, Intramuscular, PRN    glucose, 16 g, Oral, PRN    glucose, 24 g, Oral, PRN    HYDROcodone-acetaminophen, 1 tablet, Oral, Q4H PRN    HYDROmorphone, 1 mg, Intravenous, Q4H PRN    insulin aspart U-100, 0-5 Units, Subcutaneous, QID (AC + HS) PRN    magnesium oxide, 800 mg, Oral, PRN    magnesium oxide, 800 mg, Oral, PRN    melatonin, 6 mg, Oral, Nightly PRN    naloxone, 0.02 mg, Intravenous, PRN    ondansetron, 4 mg, Intravenous, Q6H PRN    potassium bicarbonate, 35 mEq, Oral, PRN    potassium bicarbonate, 50 mEq, Oral, PRN    potassium bicarbonate, 60 mEq, Oral, PRN    potassium, sodium phosphates, 2 packet, Oral, PRN    potassium, sodium phosphates, 2 packet, Oral, PRN    potassium, sodium phosphates, 2 packet, Oral, PRN    sodium chloride 0.9%, 250 mL, Intravenous, PRN    sodium chloride 0.9%, 3 mL, Intravenous, Q12H PRN    Pharmacy to dose Vancomycin consult, , , Once **AND** vancomycin - pharmacy to dose, , Intravenous, pharmacy to manage frequency    Allergies:  Mushroom    Past Family History:  Reviewed; refer to Hospitalist Admission Note    Review of Systems:  Review of Systems - All 14 systems reviewed and negative, except as noted in HPI    Physical Exam:    /66   Pulse 97   Temp 98.3 °F (36.8 °C) (Oral)   Resp 18   Ht 6' 2" (1.88 m)   Wt 134 kg (295 lb 6.7 oz)   SpO2 (!) 92%   BMI 37.93 kg/m²     General Appearance:    Alert, cooperative, no distress, appears stated age   Head:    Normocephalic, " without obvious abnormality, atraumatic   Eyes:    PER, conjunctiva/corneas clear, EOM's intact in both eyes        Throat:   Lips, mucosa, and tongue normal; teeth and gums normal   Back:     Symmetric, no curvature, ROM normal, no CVA tenderness   Lungs:     Clear to auscultation bilaterally, respirations unlabored   Chest wall:    No tenderness or deformity   Heart:    Regular rate and rhythm, S1 and S2 normal, no murmur, rub   or gallop   Abdomen:     Soft, non-tender, bowel sounds active all four quadrants,     no masses, no organomegaly   Extremities:   Extremities normal, atraumatic, no cyanosis or edema   Pulses:   2+ and symmetric all extremities   MSK:   No joint or muscle swelling, tenderness or deformity   Skin:   Skin color, texture, turgor normal, no rashes or lesions   Neurologic:   CNII-XII intact, normal strength and sensation       Throughout.  No flap     Results:  Lab Results   Component Value Date     (L) 09/19/2024    K 4.4 09/19/2024    CL 91 (L) 09/19/2024    CO2 26 09/19/2024    BUN 56 (H) 09/19/2024    CREATININE 17.6 (H) 09/19/2024    CALCIUM 8.3 (L) 09/19/2024    ANIONGAP 18 (H) 09/19/2024       Lab Results   Component Value Date    CALCIUM 8.3 (L) 09/19/2024    PHOS 5.4 (H) 06/28/2023       Recent Labs   Lab 09/19/24  0500   WBC 10.77   RBC 2.01*   HGB 6.6*   HCT 20.7*      *   MCH 32.8*   MCHC 31.9*          I have personally reviewed pertinent radiological imaging and reports.    Mariano Hickey MD  [facility] [place]  [phone]

## 2024-09-19 NOTE — ASSESSMENT & PLAN NOTE
Nephrology consulted for routine dialysis MWF, AVF is not functional and tiralysis catheter being placed today.   Renal dose medications

## 2024-09-19 NOTE — NURSING
Old dressing fell off. Dressing to left groin changed with new gauze, ABD pad, and tape. Site clean with no redness noted. Penrose drain intact. Patient tolerated well.

## 2024-09-19 NOTE — ASSESSMENT & PLAN NOTE
Was unable to access AVF on 9/18  Trialysis catheter being placed today  Transfer to [facility] for vascular evaluation

## 2024-09-19 NOTE — SUBJECTIVE & OBJECTIVE
Interval History: Patient states he is uncomfortable. Still has pain in the groin area and also states his testicles are swollen. His Hb dropped to 6.6, transfusion planned during dialysis today.     Review of Systems  Objective:     Vital Signs (Most Recent):  Temp: 98.3 °F (36.8 °C) (09/19/24 0800)  Pulse: 95 (09/19/24 1354)  Resp: 16 (09/19/24 1354)  BP: 129/66 (09/19/24 0903)  SpO2: (!) 92 % (09/19/24 0800) Vital Signs (24h Range):  Temp:  [97.5 °F (36.4 °C)-98.3 °F (36.8 °C)] 98.3 °F (36.8 °C)  Pulse:  [] 95  Resp:  [16-20] 16  SpO2:  [92 %-100 %] 92 %  BP: ()/(56-72) 129/66     Weight: 134 kg (295 lb 6.7 oz)  Body mass index is 37.93 kg/m².    Intake/Output Summary (Last 24 hours) at 9/19/2024 1405  Last data filed at 9/19/2024 0549  Gross per 24 hour   Intake 7947.98 ml   Output 10 ml   Net 7937.98 ml      Physical Exam  Vitals and nursing note reviewed.   Constitutional:       General: He is not in acute distress.     Appearance: He is obese. He is not toxic-appearing.   HENT:      Head: Atraumatic.      Mouth/Throat:      Mouth: Mucous membranes are moist.      Pharynx: Oropharynx is clear.   Eyes:      General: No scleral icterus.     Conjunctiva/sclera: Conjunctivae normal.      Pupils: Pupils are equal, round, and reactive to light.   Cardiovascular:      Rate and Rhythm: Regular rhythm.      Heart sounds: No murmur heard.  Pulmonary:      Effort: No respiratory distress.      Breath sounds: No wheezing, rhonchi or rales.   Abdominal:      General: Abdomen is flat. Bowel sounds are normal.      Palpations: Abdomen is soft.   Genitourinary:     Comments: There is left groin dressing and also laparoscopic scars in the abdomen   Musculoskeletal:         General: No swelling or deformity.      Cervical back: No rigidity or tenderness.   Skin:     Coloration: Skin is not jaundiced or pale.      Findings: No bruising, erythema or rash.   Neurological:      General: No focal deficit present.       Mental Status: He is alert and oriented to person, place, and time.      Cranial Nerves: No cranial nerve deficit.      Sensory: No sensory deficit.      Motor: No weakness.   Psychiatric:         Mood and Affect: Mood normal.         Behavior: Behavior normal    Significant Labs: All pertinent labs within the past 24 hours have been reviewed.  CBC:   Recent Labs   Lab 09/18/24  0430 09/19/24  0500   WBC 9.12 10.77   HGB 7.7* 6.6*   HCT 23.9* 20.7*    266     CMP:   Recent Labs   Lab 09/18/24  0429 09/19/24  0500   * 135*   K 6.2* 4.4   CL 93* 91*   CO2 21* 26   * 114*   BUN 47* 56*   CREATININE 16.2* 17.6*   CALCIUM 8.3* 8.3*   PROT 7.3 6.8   ALBUMIN 2.5* 2.2*   BILITOT 0.8 0.5   ALKPHOS 63 84   AST 16 33   ALT 9* 14   ANIONGAP 20* 18*       Significant Imaging: I have reviewed all pertinent imaging results/findings within the past 24 hours.

## 2024-09-19 NOTE — PROCEDURES
"João Ham is a 44 y.o. male patient.    Temp: 98 °F (36.7 °C) (09/19/24 1800)  Pulse: 86 (09/19/24 1800)  Resp: 18 (09/19/24 1600)  BP: 114/64 (09/19/24 1800)  SpO2: 96 % (09/19/24 1600)  Weight: 134 kg (295 lb 6.7 oz) (09/18/24 0010)  Height: 6' 2" (188 cm) (09/17/24 0829)       Central Line    Date/Time: 9/17/2024 8:31 AM    Performed by: Galileo Connors MD  Authorized by: Galileo Connors MD    Location procedure was performed:  [facility] GENERAL SURGERY  Pre-operative diagnosis:  REnal faiure  Post-operative diagnosis:  Same  Consent Done ?:  Yes  Time out complete?: Verified correct patient, procedure, equipment, staff, and site/side    Indications:  Hemodialysis  Anesthesia:  Local infiltration  Local anesthetic:  Lidocaine 1% with epinephrine  Anesthetic total (ml):  5  Preparation:  Skin prepped with ChloraPrep  Location:  Left internal jugular  Catheter type:  Triple lumen  Catheter size:  13 Fr  Inserted Catheter Length (cm):  16  Ultrasound guidance: Yes    Vessel Caliber:  Large  Comprressibility:  Normal  Manometry: No    Number of attempts:  1  Securement:  Line sutured  Technical Procedures Used:  Kathydinger  Complications: No    Estimated blood loss (mL):  115  Adverse Events:  None  Other Complications:  Multiple attempts on R were unsuccessful.     Multiple attempts on R were unsuccessful.        9/19/2024    "

## 2024-09-19 NOTE — PLAN OF CARE
Post op added to AVS       09/19/24 1525   Post-Acute Status   Hospital Resources/Appts/Education Provided Appointments scheduled and added to AVS

## 2024-09-19 NOTE — PLAN OF CARE
Plan of care reviewed with patient. Verbalized understanding. Both IV sites intact and patent with fluids infusing per order. O2 in place to maintain proper oxygen levels. BERNABE drain intact and to suction. Dressing to left groin CDI. No nausea noted. Passing gas but no BM yet. Tele in place and being monitored. Glucose monitored and covered with sliding scale insulin if needed. Pain managed with prn medications. Patient ambulated in foster and in room to bathroom. SCD's in place. Safety maintained. Call light in reach and instructed to call for assistance. Will continue to monitor.

## 2024-09-19 NOTE — PLAN OF CARE
Problem: Adult Inpatient Plan of Care  Goal: Plan of Care Review  Outcome: Progressing  Goal: Patient-Specific Goal (Individualized)  Outcome: Progressing  Goal: Optimal Comfort and Wellbeing  Outcome: Progressing     Problem: Wound  Goal: Optimal Pain Control and Function  Outcome: Progressing  Goal: Optimal Wound Healing  Outcome: Progressing     Problem: Skin Injury Risk Increased  Goal: Skin Health and Integrity  Outcome: Progressing     Problem: Hemodialysis  Goal: Effective Tissue Perfusion  Outcome: Progressing     Problem: Fall Injury Risk  Goal: Absence of Fall and Fall-Related Injury  Outcome: Progressing   POC has been discussed with pt.  Pt was bladder scanned today of 183 mls.  Pt later ambulated to bathroom and urine was made and collected for UA and pt had a BM today.  Pt received tri-dialysis catheter in L side of neck to be used for Dialysis.  BERNABE drain was drained and still intact.  Pt is post-op day 2 from surgery with Dr. Connors for inguinal hernia repair and robotic sigmoid resection and anastomosis.  Dsg to penrose drains were changed today.  Pt continues on O2 2L NC.  Pt was advanced to full liquid diet.  Pt was dialysized with 2 units of blood for an H/H of 6.6.  No falls during shift.  Once pt has finished HD and is stable, he will be transferred to [facility].  No replacements needed today.  No possible discharge at this time.

## 2024-09-19 NOTE — ASSESSMENT & PLAN NOTE
Anemia is likely due to chronic disease due to ESRD. Most recent hemoglobin and hematocrit are listed below.  Recent Labs     09/17/24  0851 09/18/24  0430 09/19/24  0500   HGB 8.7* 7.7* 6.6*   HCT 27.2* 23.9* 20.7*     Plan  - Monitor serial CBC: Daily  - Planning to transfuse 1 unit PRBC during dialysis today

## 2024-09-19 NOTE — NURSING
Pt arrived to floor via bed from PACU. NAD noted. Bedside report received from Deanne BENITEZ. VSS. Updated primary RN Joana who will resume pt care.

## 2024-09-19 NOTE — PROGRESS NOTES
POD 2 s/p ex lap with colon resection  Pt resting comfortably  Reports flatus and SMall bm  Notes some abd pain  Pt with elevated K yesterday  L arm fistula unable to be used yesterday    Wt Readings from Last 3 Encounters:   09/18/24 134 kg (295 lb 6.7 oz)   08/29/24 131.3 kg (289 lb 7.4 oz)   08/22/24 131 kg (288 lb 12.8 oz)     Temp Readings from Last 3 Encounters:   09/19/24 98 °F (36.7 °C) (Oral)   08/22/24 97.3 °F (36.3 °C) (Temporal)   07/30/24 98.6 °F (37 °C) (Oral)     BP Readings from Last 3 Encounters:   09/19/24 114/64   08/29/24 99/68   08/22/24 (!) 137/91     Pulse Readings from Last 3 Encounters:   09/19/24 86   08/29/24 (!) 115   08/22/24 110     AAox3  Soft/obses/ few BS noted  BERNABE serosang    Lab Results   Component Value Date    WBC 10.77 09/19/2024    HGB 6.6 (LL) 09/19/2024    HCT 20.7 (LL) 09/19/2024     (H) 09/19/2024     09/19/2024       BMP  Lab Results   Component Value Date     (L) 09/19/2024    K 4.4 09/19/2024    CL 91 (L) 09/19/2024    CO2 26 09/19/2024    BUN 56 (H) 09/19/2024    CREATININE 17.6 (H) 09/19/2024    CALCIUM 8.3 (L) 09/19/2024    ANIONGAP 18 (H) 09/19/2024    EGFRNORACEVR 3 (A) 09/19/2024     A?P: s/p ex lap with colon resection  Ambulate  Aggressive IS  Asked by nephrology to place trialysis   Will place  REcommend transfer to [facility] main also for evaluation and mgmt of nonfunctioning fistula.

## 2024-09-19 NOTE — PLAN OF CARE
Problem: Adult Inpatient Plan of Care  Goal: Plan of Care Review  Outcome: Progressing     Problem: Adult Inpatient Plan of Care  Goal: Absence of Hospital-Acquired Illness or Injury  Outcome: Progressing     Problem: Adult Inpatient Plan of Care  Goal: Readiness for Transition of Care  Outcome: Progressing     Problem: Wound  Goal: Optimal Coping  Outcome: Progressing     Problem: Wound  Goal: Optimal Functional Ability  Outcome: Progressing     Problem: Infection  Goal: Absence of Infection Signs and Symptoms  Outcome: Progressing     Problem: Skin Injury Risk Increased  Goal: Skin Health and Integrity  Outcome: Progressing     Problem: Hemodialysis  Goal: Safe, Effective Therapy Delivery  Outcome: Progressing

## 2024-09-20 LAB
ALBUMIN SERPL BCP-MCNC: 3.1 G/DL (ref 3.5–5.2)
ALBUMIN SERPL BCP-MCNC: 3.2 G/DL (ref 3.5–5.2)
ALP SERPL-CCNC: 71 U/L (ref 55–135)
ALP SERPL-CCNC: 71 U/L (ref 55–135)
ALT SERPL W/O P-5'-P-CCNC: 14 U/L (ref 10–44)
ALT SERPL W/O P-5'-P-CCNC: 14 U/L (ref 10–44)
ANION GAP SERPL CALC-SCNC: 13 MMOL/L (ref 8–16)
ANION GAP SERPL CALC-SCNC: 14 MMOL/L (ref 8–16)
ANISOCYTOSIS BLD QL SMEAR: SLIGHT
APTT PPP: 36.2 SEC (ref 21–32)
AST SERPL-CCNC: 26 U/L (ref 10–40)
AST SERPL-CCNC: 26 U/L (ref 10–40)
BASOPHILS NFR BLD: 0 % (ref 0–1.9)
BILIRUB DIRECT SERPL-MCNC: 0.2 MG/DL (ref 0.1–0.3)
BILIRUB SERPL-MCNC: 0.6 MG/DL (ref 0.1–1)
BILIRUB SERPL-MCNC: 0.6 MG/DL (ref 0.1–1)
BNP SERPL-MCNC: 133 PG/ML (ref 0–99)
BUN SERPL-MCNC: 42 MG/DL (ref 6–20)
BUN SERPL-MCNC: 42 MG/DL (ref 6–20)
CALCIUM SERPL-MCNC: 8.2 MG/DL (ref 8.7–10.5)
CALCIUM SERPL-MCNC: 8.3 MG/DL (ref 8.7–10.5)
CHLORIDE SERPL-SCNC: 94 MMOL/L (ref 95–110)
CHLORIDE SERPL-SCNC: 95 MMOL/L (ref 95–110)
CK SERPL-CCNC: 929 U/L (ref 20–200)
CO2 SERPL-SCNC: 26 MMOL/L (ref 23–29)
CO2 SERPL-SCNC: 26 MMOL/L (ref 23–29)
CREAT SERPL-MCNC: 13.2 MG/DL (ref 0.5–1.4)
CREAT SERPL-MCNC: 13.4 MG/DL (ref 0.5–1.4)
CRP SERPL-MCNC: >16 MG/DL
DIFFERENTIAL METHOD BLD: ABNORMAL
EOSINOPHIL NFR BLD: 0 % (ref 0–8)
ERYTHROCYTE [DISTWIDTH] IN BLOOD BY AUTOMATED COUNT: 20.7 % (ref 11.5–14.5)
EST. GFR  (NO RACE VARIABLE): 4.3 ML/MIN/1.73 M^2
EST. GFR  (NO RACE VARIABLE): 4.3 ML/MIN/1.73 M^2
GLUCOSE SERPL-MCNC: 104 MG/DL (ref 70–110)
GLUCOSE SERPL-MCNC: 105 MG/DL (ref 70–110)
HCT VFR BLD AUTO: 22.5 % (ref 40–54)
HGB BLD-MCNC: 7.3 G/DL (ref 14–18)
IMM GRANULOCYTES # BLD AUTO: ABNORMAL K/UL (ref 0–0.04)
IMM GRANULOCYTES NFR BLD AUTO: ABNORMAL % (ref 0–0.5)
INR PPP: 1.1 (ref 0.8–1.2)
LACTATE SERPL-SCNC: 0.7 MMOL/L (ref 0.5–1.9)
LYMPHOCYTES NFR BLD: 7 % (ref 18–48)
MAGNESIUM SERPL-MCNC: 1.8 MG/DL (ref 1.6–2.6)
MAGNESIUM SERPL-MCNC: 1.8 MG/DL (ref 1.6–2.6)
MCH RBC QN AUTO: 31.1 PG (ref 27–31)
MCHC RBC AUTO-ENTMCNC: 32.4 G/DL (ref 32–36)
MCV RBC AUTO: 96 FL (ref 82–98)
MONOCYTES NFR BLD: 5 % (ref 4–15)
NEUTROPHILS NFR BLD: 76 % (ref 38–73)
NEUTS BAND NFR BLD MANUAL: 12 %
NRBC BLD-RTO: 0 /100 WBC
PHOSPHATE SERPL-MCNC: 5.2 MG/DL (ref 2.7–4.5)
PLATELET # BLD AUTO: 239 K/UL (ref 150–450)
PLATELET BLD QL SMEAR: ABNORMAL
PMV BLD AUTO: 10.3 FL (ref 9.2–12.9)
POCT GLUCOSE: 107 MG/DL (ref 70–110)
POTASSIUM SERPL-SCNC: 4.9 MMOL/L (ref 3.5–5.1)
POTASSIUM SERPL-SCNC: 5 MMOL/L (ref 3.5–5.1)
PROT SERPL-MCNC: 6.9 G/DL (ref 6–8.4)
PROT SERPL-MCNC: 6.9 G/DL (ref 6–8.4)
PROTHROMBIN TIME: 11.8 SEC (ref 9–12.5)
RBC # BLD AUTO: 2.35 M/UL (ref 4.6–6.2)
SODIUM SERPL-SCNC: 133 MMOL/L (ref 136–145)
SODIUM SERPL-SCNC: 135 MMOL/L (ref 136–145)
TROPONIN I SERPL HS-MCNC: 28.5 PG/ML (ref 0–14.9)
VANCOMYCIN SERPL-MCNC: 12.5 UG/ML
WBC # BLD AUTO: 13.02 K/UL (ref 3.9–12.7)

## 2024-09-20 PROCEDURE — 83735 ASSAY OF MAGNESIUM: CPT | Performed by: INTERNAL MEDICINE

## 2024-09-20 PROCEDURE — 83880 ASSAY OF NATRIURETIC PEPTIDE: CPT | Performed by: INTERNAL MEDICINE

## 2024-09-20 PROCEDURE — 3E03317 INTRODUCTION OF OTHER THROMBOLYTIC INTO PERIPHERAL VEIN, PERCUTANEOUS APPROACH: ICD-10-PCS | Performed by: INTERNAL MEDICINE

## 2024-09-20 PROCEDURE — 94761 N-INVAS EAR/PLS OXIMETRY MLT: CPT

## 2024-09-20 PROCEDURE — 25000003 PHARM REV CODE 250: Performed by: INTERNAL MEDICINE

## 2024-09-20 PROCEDURE — 0Y960ZZ DRAINAGE OF LEFT INGUINAL REGION, OPEN APPROACH: ICD-10-PCS | Performed by: SURGERY

## 2024-09-20 PROCEDURE — 80048 BASIC METABOLIC PNL TOTAL CA: CPT | Performed by: INTERNAL MEDICINE

## 2024-09-20 PROCEDURE — 99900035 HC TECH TIME PER 15 MIN (STAT)

## 2024-09-20 PROCEDURE — 85610 PROTHROMBIN TIME: CPT | Performed by: INTERNAL MEDICINE

## 2024-09-20 PROCEDURE — 0DBN4ZZ EXCISION OF SIGMOID COLON, PERCUTANEOUS ENDOSCOPIC APPROACH: ICD-10-PCS | Performed by: SURGERY

## 2024-09-20 PROCEDURE — 25000003 PHARM REV CODE 250: Performed by: SURGERY

## 2024-09-20 PROCEDURE — 83605 ASSAY OF LACTIC ACID: CPT | Performed by: INTERNAL MEDICINE

## 2024-09-20 PROCEDURE — 80076 HEPATIC FUNCTION PANEL: CPT | Performed by: INTERNAL MEDICINE

## 2024-09-20 PROCEDURE — 63600175 PHARM REV CODE 636 W HCPCS: Performed by: SURGERY

## 2024-09-20 PROCEDURE — 85007 BL SMEAR W/DIFF WBC COUNT: CPT | Performed by: INTERNAL MEDICINE

## 2024-09-20 PROCEDURE — 84484 ASSAY OF TROPONIN QUANT: CPT | Performed by: INTERNAL MEDICINE

## 2024-09-20 PROCEDURE — 8E0W4CZ ROBOTIC ASSISTED PROCEDURE OF TRUNK REGION, PERCUTANEOUS ENDOSCOPIC APPROACH: ICD-10-PCS | Performed by: SURGERY

## 2024-09-20 PROCEDURE — 84100 ASSAY OF PHOSPHORUS: CPT | Performed by: INTERNAL MEDICINE

## 2024-09-20 PROCEDURE — 27100171 HC OXYGEN HIGH FLOW UP TO 24 HOURS

## 2024-09-20 PROCEDURE — 63600175 PHARM REV CODE 636 W HCPCS: Mod: JZ,JG | Performed by: INTERNAL MEDICINE

## 2024-09-20 PROCEDURE — 12000002 HC ACUTE/MED SURGE SEMI-PRIVATE ROOM

## 2024-09-20 PROCEDURE — 80202 ASSAY OF VANCOMYCIN: CPT | Performed by: INTERNAL MEDICINE

## 2024-09-20 PROCEDURE — 99900031 HC PATIENT EDUCATION (STAT)

## 2024-09-20 PROCEDURE — 0WPF4JZ REMOVAL OF SYNTHETIC SUBSTITUTE FROM ABDOMINAL WALL, PERCUTANEOUS ENDOSCOPIC APPROACH: ICD-10-PCS | Performed by: SURGERY

## 2024-09-20 PROCEDURE — 63600175 PHARM REV CODE 636 W HCPCS: Performed by: INTERNAL MEDICINE

## 2024-09-20 PROCEDURE — 94799 UNLISTED PULMONARY SVC/PX: CPT

## 2024-09-20 PROCEDURE — 83735 ASSAY OF MAGNESIUM: CPT | Mod: 91 | Performed by: INTERNAL MEDICINE

## 2024-09-20 PROCEDURE — 94660 CPAP INITIATION&MGMT: CPT

## 2024-09-20 PROCEDURE — 99221 1ST HOSP IP/OBS SF/LOW 40: CPT | Mod: ,,, | Performed by: FAMILY MEDICINE

## 2024-09-20 PROCEDURE — 4A1BXSH MONITORING OF GASTROINTESTINAL VASCULAR PERFUSION USING INDOCYANINE GREEN DYE, EXTERNAL APPROACH: ICD-10-PCS | Performed by: SURGERY

## 2024-09-20 PROCEDURE — 86140 C-REACTIVE PROTEIN: CPT | Performed by: SURGERY

## 2024-09-20 PROCEDURE — 85730 THROMBOPLASTIN TIME PARTIAL: CPT | Performed by: INTERNAL MEDICINE

## 2024-09-20 PROCEDURE — 90935 HEMODIALYSIS ONE EVALUATION: CPT

## 2024-09-20 PROCEDURE — 82550 ASSAY OF CK (CPK): CPT | Performed by: INTERNAL MEDICINE

## 2024-09-20 PROCEDURE — 80053 COMPREHEN METABOLIC PANEL: CPT | Performed by: INTERNAL MEDICINE

## 2024-09-20 PROCEDURE — 85027 COMPLETE CBC AUTOMATED: CPT | Performed by: INTERNAL MEDICINE

## 2024-09-20 PROCEDURE — 36415 COLL VENOUS BLD VENIPUNCTURE: CPT | Performed by: INTERNAL MEDICINE

## 2024-09-20 RX ORDER — HEPARIN SODIUM 1000 [USP'U]/ML
4000 INJECTION, SOLUTION INTRAVENOUS; SUBCUTANEOUS
Status: DISCONTINUED | OUTPATIENT
Start: 2024-09-20 | End: 2024-10-06 | Stop reason: HOSPADM

## 2024-09-20 RX ORDER — OLMESARTAN MEDOXOMIL 20 MG/1
40 TABLET ORAL DAILY
Status: DISCONTINUED | OUTPATIENT
Start: 2024-09-20 | End: 2024-09-25

## 2024-09-20 RX ORDER — HEPARIN SODIUM 1000 [USP'U]/ML
4000 INJECTION, SOLUTION INTRAVENOUS; SUBCUTANEOUS
Status: DISCONTINUED | OUTPATIENT
Start: 2024-09-20 | End: 2024-09-20

## 2024-09-20 RX ORDER — OLMESARTAN MEDOXOMIL 40 MG/1
40 TABLET ORAL DAILY
Status: DISCONTINUED | OUTPATIENT
Start: 2024-09-20 | End: 2024-09-20

## 2024-09-20 RX ADMIN — BISACODYL 5 MG: 5 TABLET, COATED ORAL at 08:09

## 2024-09-20 RX ADMIN — ALTEPLASE 4 MG: 2.2 INJECTION, POWDER, LYOPHILIZED, FOR SOLUTION INTRAVENOUS at 04:09

## 2024-09-20 RX ADMIN — SODIUM ZIRCONIUM CYCLOSILICATE 10 G: 10 POWDER, FOR SUSPENSION ORAL at 08:09

## 2024-09-20 RX ADMIN — HYDROCODONE BITARTRATE AND ACETAMINOPHEN 1 TABLET: 5; 325 TABLET ORAL at 02:09

## 2024-09-20 RX ADMIN — HYDROMORPHONE HYDROCHLORIDE 1 MG: 1 INJECTION, SOLUTION INTRAMUSCULAR; INTRAVENOUS; SUBCUTANEOUS at 02:09

## 2024-09-20 RX ADMIN — ISOSORBIDE MONONITRATE 120 MG: 60 TABLET, EXTENDED RELEASE ORAL at 09:09

## 2024-09-20 RX ADMIN — CLONIDINE HYDROCHLORIDE 0.3 MG: 0.1 TABLET ORAL at 08:09

## 2024-09-20 RX ADMIN — MUPIROCIN 1 G: 20 OINTMENT TOPICAL at 08:09

## 2024-09-20 RX ADMIN — HEPARIN SODIUM 4000 UNITS: 1000 INJECTION, SOLUTION INTRAVENOUS; SUBCUTANEOUS at 05:09

## 2024-09-20 RX ADMIN — GABAPENTIN 200 MG: 100 CAPSULE ORAL at 08:09

## 2024-09-20 RX ADMIN — SEVELAMER CARBONATE 1600 MG: 800 TABLET, FILM COATED ORAL at 08:09

## 2024-09-20 RX ADMIN — VANCOMYCIN HYDROCHLORIDE 750 MG: 750 INJECTION, POWDER, LYOPHILIZED, FOR SOLUTION INTRAVENOUS at 09:09

## 2024-09-20 RX ADMIN — HEPARIN SODIUM 7500 UNITS: 5000 INJECTION INTRAVENOUS; SUBCUTANEOUS at 09:09

## 2024-09-20 RX ADMIN — CLINDAMYCIN IN 5 PERCENT DEXTROSE 900 MG: 18 INJECTION, SOLUTION INTRAVENOUS at 04:09

## 2024-09-20 RX ADMIN — CLINDAMYCIN IN 5 PERCENT DEXTROSE 900 MG: 18 INJECTION, SOLUTION INTRAVENOUS at 08:09

## 2024-09-20 RX ADMIN — CEFEPIME 2 G: 2 INJECTION, POWDER, FOR SOLUTION INTRAVENOUS at 11:09

## 2024-09-20 RX ADMIN — SENNOSIDES AND DOCUSATE SODIUM 1 TABLET: 8.6; 5 TABLET ORAL at 09:09

## 2024-09-20 RX ADMIN — HYDRALAZINE HYDROCHLORIDE 100 MG: 25 TABLET ORAL at 08:09

## 2024-09-20 RX ADMIN — HEPARIN SODIUM 7500 UNITS: 5000 INJECTION INTRAVENOUS; SUBCUTANEOUS at 05:09

## 2024-09-20 RX ADMIN — SEVELAMER CARBONATE 1600 MG: 800 TABLET, FILM COATED ORAL at 07:09

## 2024-09-20 RX ADMIN — CLINDAMYCIN IN 5 PERCENT DEXTROSE 900 MG: 18 INJECTION, SOLUTION INTRAVENOUS at 09:09

## 2024-09-20 RX ADMIN — HYDROMORPHONE HYDROCHLORIDE 1 MG: 1 INJECTION, SOLUTION INTRAMUSCULAR; INTRAVENOUS; SUBCUTANEOUS at 08:09

## 2024-09-20 RX ADMIN — EPOETIN ALFA-EPBX 10000 UNITS: 10000 INJECTION, SOLUTION INTRAVENOUS; SUBCUTANEOUS at 05:09

## 2024-09-20 NOTE — ASSESSMENT & PLAN NOTE
Anemia is likely due to chronic disease due to ESRD. Most recent hemoglobin and hematocrit are listed below.  Recent Labs     09/18/24  0430 09/19/24  0500 09/20/24  0153   HGB 7.7* 6.6* 7.3*   HCT 23.9* 20.7* 22.5*       Plan  - Monitor serial CBC: Daily  - s/p 1 unit of pRBC on 9/19/24.

## 2024-09-20 NOTE — ANESTHESIA PREPROCEDURE EVALUATION
09/20/2024  João Ham is a 44 y.o., male.      Pre-op Assessment    I have reviewed the NPO Status.   I have reviewed the Medications.     Review of Systems  Anesthesia Hx:  No problems with previous Anesthesia                Cardiovascular:  Exercise tolerance: good   Hypertension       CHF              Congestive Heart Failure (CHF)                Hypertension         Pulmonary:        Sleep Apnea, CPAP     Obstructive Sleep Apnea (CLOVIS).      Education provided regarding risk of obstructive sleep apnea            Renal/:  Chronic Renal Disease        Kidney Function/Disease             Hepatic/GI:        Inguinal hernia with gangrene          Endocrine:        Obesity / BMI > 30  Psych:  Psychiatric History                  Physical Exam  General: Well nourished    Airway:  Mallampati: II   Mouth Opening: Normal  TM Distance: Normal  Tongue: Normal  Neck ROM: Normal ROM    Dental:  Intact    Chest/Lungs:  Clear to auscultation, Normal Respiratory Rate        Anesthesia Plan  Type of Anesthesia, risks & benefits discussed:    Anesthesia Type: Gen ETT  Intra-op Monitoring Plan: Standard ASA Monitors and Art Line  Post Op Pain Control Plan: multimodal analgesia and IV/PO Opioids PRN  Induction:  IV  Airway Plan: Direct, Post-Induction  Informed Consent: Informed consent signed with the Patient and all parties understand the risks and agree with anesthesia plan.  All questions answered. Patient consented to blood products? Yes  ASA Score: 3    Ready For Surgery From Anesthesia Perspective.     .

## 2024-09-20 NOTE — PROGRESS NOTES
Pharmacokinetic Assessment Follow Up: IV Vancomycin    Patient brief summary:  João Ham is a 44 y.o. male initiated on antimicrobial therapy with IV Vancomycin for treatment of Skin & Soft Tissue infection    The patient's current regimen is intermittent dosing based on random level.      Actual Body Weight = 134 kg (295 lb 6.7 oz).    Renal Function:   Estimated Creatinine Clearance: 10.3 mL/min (A) (based on SCr of 13.4 mg/dL (H)).      Vancomycin serum concentration assessment(s):    The trough level was drawn correctly and can be used to guide therapy at this time. The measurement is below the desired definitive target range of 15 to 20 mcg/mL.    Vancomycin Regimen Plan:    Re-dose Vancomycin 750 mg once post dialysis. Next random level to be drawn with AM labs on 9/23.    Drug levels (last 3 results):  Recent Labs   Lab Result Units 09/18/24  1112 09/20/24  0152   Vancomycin, Random ug/mL 12.6 12.5          Dialysis Method (if applicable):  intermittent HD; (MWF)      Pharmacy will continue to follow and monitor vancomycin.    Please contact pharmacy at extension [phone] for questions regarding this assessment.    Thank you for the consult,   Miranda Mcgill

## 2024-09-20 NOTE — CONSULTS
Lap incisions to abdomen with bandaids intact, Stu drain on the right side of abdomen. Scrotal swelling, ice in place Plastic drain in pubis and left side of testicle, cleaned and dried skin around area, a second drain is sutured in the left side of lower abdomen, cleaned and dried and covered with gauze and abd pads, instructed nurse to try mesh panty in versette, velcro tabs on the side to secure dressing. Bilateral buttock without redness or breakdown, completed skin assessment with Dr Recinos

## 2024-09-20 NOTE — ANESTHESIA POSTPROCEDURE EVALUATION
Anesthesia Post Evaluation    Patient: João Ham    Procedure(s) Performed: Procedure(s) (LRB):  XI ROBOTIC SIGMOID RESECTION, WITH ANASTAMOSIS (N/A)  DRAINAGE, ABSCESS, GROIN (Left)    Final Anesthesia Type: general      Patient location during evaluation: PACU  Patient participation: Yes- Able to Participate  Level of consciousness: awake and alert and oriented  Post-procedure vital signs: reviewed and stable  Pain management: adequate  Airway patency: patent  CLOVIS mitigation strategies: Multimodal analgesia, Extubation while patient is awake, Verification of full reversal of neuromuscular block and Extubation and recovery carried out in lateral, semiupright, or other nonsupine position  PONV status at discharge: No PONV  Anesthetic complications: no      Cardiovascular status: blood pressure returned to baseline  Respiratory status: unassisted, spontaneous ventilation and room air  Hydration status: euvolemic  Follow-up not needed.              Vitals Value Taken Time   /72 09/20/24 1207   Temp 37.1 °C (98.7 °F) 09/20/24 1207   Pulse 87 09/20/24 1207   Resp 17 09/20/24 1207   SpO2 94 % 09/20/24 1207         Event Time   Out of Recovery 18:38:00         Pain/Evans Score: Pain Rating Prior to Med Admin: 9 (9/20/2024  8:18 AM)  Pain Rating Post Med Admin: 7 (9/20/2024  8:48 AM)

## 2024-09-20 NOTE — CARE UPDATE
09/19/24 1953   Patient Assessment/Suction   Level of Consciousness (AVPU) alert   Respiratory Effort Unlabored   Expansion/Accessory Muscles/Retractions expansion symmetric;no retractions;no use of accessory muscles   PRE-TX-O2   Device (Oxygen Therapy) nasal cannula   Flow (L/min) (Oxygen Therapy) 2   Oxygen Concentration (%) 28   Oxygen Analyzed Concentration (%) (!) 3 %   SpO2 (!) 94 %   Pulse Oximetry Type Intermittent   Pulse 98   Resp 16   Incentive Spirometer   $ Incentive Spirometer Charges refused   Administration (IS) refused   Ready to Wean/Extubation Screen   FIO2<=50 (chart decimal) 0.28

## 2024-09-20 NOTE — NURSING
Nurses Note -- 4 Eyes      9/19/2024   11:52 PM      Skin assessed during: Admit      [] No Altered Skin Integrity Present    []Prevention Measures Documented      [x] Yes- Altered Skin Integrity Present or Discovered   [] LDA Added if Not in Epic (Describe Wound)   [x] New Altered Skin Integrity was Present on Admit and Documented in LDA   [x] Wound Image Taken    Wound Care Consulted? Yes    Attending Nurse:  Pilar Hahn RN/Staff Member:  ELI Robb

## 2024-09-20 NOTE — CONSULTS
INPATIENT NEPHROLOGY CONSULT   [facility] NEPHROLOGY    João Ham  09/20/2024    Reason for consultation:    esrd    Chief Complaint:   Chief Complaint   Patient presents with    Fatigue    Groin Swelling     X 2 days.  Hx of Kidney failure and CHF          History of Present Illness:     Per H and P    Patient is a 44 year old male with history of ESDR on dialysis MWF, awaiting kidney transplant, HTN, presented to ED with 3 days history of left groin pain and swelling. States swelling comes every time he cough and is being painful. Patient has low grade fever 99s at home. He has been vomiting bilious fluid last 2-3 days. No diarrhea or abdominal pain.      In the ED CT abdomen showed Left inguinal hernia containing fat and air as well as marked inflammation extending from inside the pelvis, in the hernia and down into the left scrotum. This is consistent with an infected inguinal hernia, possible gangrene. WBC was 14, patient was tachycardic. General surgery was consulted and patient was admitted under hospitalist.     9/19  avf nonfunctional.   No sob or chest pain.  Has post op pain.  AFVSS  9/20  afvss.  Pt doesn't want to see previous vascular surgeon who put his avf in.  No alternative available.  Transfer center called.  Has temporary trialysis catheter.  Patient seen on hemodialysis for uremic clearance and ultrafiltration.          Plan of Care:       Assessment:    esrd  --continue dialysis per routine  --fluid restrict  --renal dose medication per routine  --continue outpt medication  --continue binders with meals    Anemia  --erythropoiesis stimulating agent with renal replacement therapy    Hyperphosphatemia  --renal diet  --continue binders    Hypertension  --uf with hd  --fluid restrict  --low salt diet  --continue home medication    Hyperkalemia  --2k dialysate  --low k diet    AVF malfunction  --got temporary line  --transfer center called to try to find vascular surgeon.   If unable to do so he  will need a tunneled catheter.  Hopefully if it comes to that we can get general surgery to do it.           Thank you for allowing us to participate in this patient's care. We will continue to follow.    Vital Signs:  Temp Readings from Last 3 Encounters:   09/20/24 98.7 °F (37.1 °C) (Oral)   08/22/24 97.3 °F (36.3 °C) (Temporal)   07/30/24 98.6 °F (37 °C) (Oral)       Pulse Readings from Last 3 Encounters:   09/20/24 87   08/29/24 (!) 115   08/22/24 110       BP Readings from Last 3 Encounters:   09/20/24 130/72   08/29/24 99/68   08/22/24 (!) 137/91       Weight:  Wt Readings from Last 3 Encounters:   09/18/24 134 kg (295 lb 6.7 oz)   08/29/24 131.3 kg (289 lb 7.4 oz)   08/22/24 131 kg (288 lb 12.8 oz)       Past Medical & Surgical History:  Past Medical History:   Diagnosis Date    Allergy     Anemia     Anxiety     CHF (congestive heart failure)     Depression     High blood pressure with chronic kidney disease, stage 5 chronic kidney disease or end stage renal disease     Hypertension        Past Surgical History:   Procedure Laterality Date    ABSCESS DRAINAGE Left 9/17/2024    Procedure: DRAINAGE, ABSCESS, GROIN;  Surgeon: Galileo Connors MD;  Location: [facility];  Service: General;  Laterality: Left;    FISTULOGRAM Bilateral 4/30/2024    Procedure: Fistulogram;  Surgeon: Khoobehi, Ali, MD;  Location: [facility] CATH/EP LAB;  Service: Vascular;  Laterality: Bilateral;    HERNIA REPAIR Right     With mesh    INSERTION, CATHETER, TUNNELED N/A 6/22/2023    Procedure: Insertion,catheter,tunneled;  Surgeon: Everett Caicedo MD;  Location: [facility] OR;  Service: General;  Laterality: N/A;    PERCUTANEOUS TRANSLUMINAL ANGIOPLASTY OF ARTERIOVENOUS FISTULA Left 4/30/2024    Procedure: PTA, AV FISTULA;  Surgeon: Khoobehi, Ali, MD;  Location: [facility] CATH/EP LAB;  Service: Vascular;  Laterality: Left;    PHLEBOGRAPHY N/A 7/19/2024    Procedure: Venogram;  Surgeon: Khoobehi, Ali, MD;  Location: [facility] CATH/EP LAB;  Service: Vascular;   Laterality: N/A;    REMOVAL, TUNNELED CATH Right 7/19/2024    Procedure: REMOVAL, TUNNELED CATH;  Surgeon: Khoobehi, Ali, MD;  Location: [facility] CATH/EP LAB;  Service: Vascular;  Laterality: Right;    ROBOT-ASSISTED LAPAROSCOPIC RESECTION OF SIGMOID COLON USING DA DELANO XI N/A 9/17/2024    Procedure: XI ROBOTIC SIGMOID RESECTION, WITH ANASTAMOSIS;  Surgeon: Galileo Connors MD;  Location: [facility] OR;  Service: General;  Laterality: N/A;  possible open have instruments available       Past Social History:  Social History     Socioeconomic History    Marital status: Single   Tobacco Use    Smoking status: Never     Passive exposure: Never    Smokeless tobacco: Never   Substance and Sexual Activity    Alcohol use: Never    Drug use: Never    Sexual activity: Not Currently   Social History Narrative    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     Social Determinants of Health     Financial Resource Strain: Low Risk  (9/18/2024)    Overall Financial Resource Strain (CARDIA)     Difficulty of Paying Living Expenses: Not very hard   Food Insecurity: No Food Insecurity (9/18/2024)    Hunger Vital Sign     Worried About Running Out of Food in the Last Year: Never true     Ran Out of Food in the Last Year: Never true   Transportation Needs: No Transportation Needs (9/18/2024)    TRANSPORTATION NEEDS     Transportation : No   Physical Activity: Sufficiently Active (1/3/2023)    Exercise Vital Sign     Days of Exercise per Week: 6 days     Minutes of Exercise per Session: 60 min   Recent Concern: Physical Activity - Insufficiently Active (10/11/2022)    Exercise Vital Sign     Days of Exercise per Week: 3 days     Minutes of Exercise per Session: 30 min   Stress: Stress Concern Present (9/18/2024)    [employer] [employer] - Occupational Stress Questionnaire     Feeling of Stress : Very much   Housing Stability: Low Risk  (9/18/2024)    Housing Stability Vital Sign     Unable to Pay for Housing in the Last Year: No     Homeless in  the Last Year: No       Medications:  No current facility-administered medications on file prior to encounter.     Current Outpatient Medications on File Prior to Encounter   Medication Sig Dispense Refill    apixaban (ELIQUIS) 2.5 mg Tab Take 2.5 mg by mouth 2 (two) times daily.      calcitRIOL (ROCALTROL) 0.25 MCG Cap Take 1 capsule by mouth once daily (Patient taking differently: Take 0.25 mcg by mouth once daily.) 90 capsule 1    cholecalciferol, vitamin D3, 125 mcg (5,000 unit) Tab Take 1 tablet by mouth once daily.      cloNIDine (CATAPRES) 0.3 MG tablet TAKE 1 TABLET BY MOUTH THREE TIMES DAILY 270 tablet 2    isosorbide mononitrate (IMDUR) 120 MG 24 hr tablet Take 120 mg by mouth once daily.      metoprolol succinate (TOPROL-XL) 50 MG 24 hr tablet Take 150 mg by mouth once daily.      olmesartan (BENICAR) 40 MG tablet Take 1 tablet by mouth once daily 90 tablet 0    sevelamer carbonate (RENVELA) 800 mg Tab Take 2 tablets (1,600 mg total) by mouth 3 (three) times daily with meals. 180 tablet 11    calcium carbonate (TUMS) 200 mg calcium (500 mg) chewable tablet Take 2 tablets (1,000 mg total) by mouth 3 (three) times daily with meals. (Patient taking differently: Take 1,000 mg by mouth 3 (three) times daily.) 180 tablet 11    hydrALAZINE (APRESOLINE) 100 MG tablet Take 1 tablet (100 mg total) by mouth 3 (three) times daily. 90 tablet 11     Scheduled Meds:   bisacodyL  5 mg Oral QHS    ceFEPime IV (PEDS and ADULTS)  2 g Intravenous Q24H    clindamycin in D5W  900 mg Intravenous Q6H    cloNIDine  0.3 mg Oral TID    epoetin mily-epbx  10,000 Units Intravenous Once    gabapentin  200 mg Oral BID    heparin (porcine)  7,500 Units Subcutaneous Q8H    hydrALAZINE  100 mg Oral TID    isosorbide mononitrate  120 mg Oral Daily    metoprolol succinate  150 mg Oral Daily    mupirocin   Nasal BID    olmesartan  40 mg Oral Daily    senna-docusate 8.6-50 mg  1 tablet Oral Daily    sevelamer carbonate  1,600 mg Oral TID WM     sodium zirconium cyclosilicate  10 g Oral TID    vancomycin (VANCOCIN) IV (PEDS and ADULTS)  750 mg Intravenous Once     Continuous Infusions:      PRN Meds:.  Current Facility-Administered Medications:     0.9%  NaCl infusion (for blood administration), , Intravenous, Q24H PRN    0.9%  NaCl infusion (for blood administration), , Intravenous, Q24H PRN    0.9%  NaCl infusion (for blood administration), , Intravenous, Q24H PRN    0.9% NaCl, , Intravenous, PRN    acetaminophen, 650 mg, Oral, Q8H PRN    acetaminophen, 650 mg, Oral, Q4H PRN    aluminum-magnesium hydroxide-simethicone, 30 mL, Oral, QID PRN    dextrose 10%, 12.5 g, Intravenous, PRN    dextrose 10%, 12.5 g, Intravenous, PRN    dextrose 10%, 12.5 g, Intravenous, PRN    dextrose 10%, 25 g, Intravenous, PRN    dextrose 10%, 25 g, Intravenous, PRN    dextrose 10%, 25 g, Intravenous, PRN    glucagon (human recombinant), 1 mg, Intramuscular, PRN    glucose, 16 g, Oral, PRN    glucose, 24 g, Oral, PRN    heparin (porcine), 4,000 Units, Intravenous, PRN    HYDROcodone-acetaminophen, 1 tablet, Oral, Q4H PRN    HYDROmorphone, 1 mg, Intravenous, Q4H PRN    insulin aspart U-100, 0-5 Units, Subcutaneous, QID (AC + HS) PRN    magnesium oxide, 800 mg, Oral, PRN    magnesium oxide, 800 mg, Oral, PRN    melatonin, 6 mg, Oral, Nightly PRN    naloxone, 0.02 mg, Intravenous, PRN    ondansetron, 4 mg, Intravenous, Q6H PRN    potassium bicarbonate, 35 mEq, Oral, PRN    potassium bicarbonate, 50 mEq, Oral, PRN    potassium bicarbonate, 60 mEq, Oral, PRN    potassium, sodium phosphates, 2 packet, Oral, PRN    potassium, sodium phosphates, 2 packet, Oral, PRN    potassium, sodium phosphates, 2 packet, Oral, PRN    sodium chloride 0.9%, 250 mL, Intravenous, PRN    sodium chloride 0.9%, 3 mL, Intravenous, Q12H PRN    Pharmacy to dose Vancomycin consult, , , Once **AND** vancomycin - pharmacy to dose, , Intravenous, pharmacy to manage frequency    Allergies:  Mushroom    Past  "Family History:  Reviewed; refer to Hospitalist Admission Note    Review of Systems:  Review of Systems - All 14 systems reviewed and negative, except as noted in HPI    Physical Exam:    /72   Pulse 87   Temp 98.7 °F (37.1 °C) (Oral)   Resp 17   Ht 6' 2" (1.88 m)   Wt 134 kg (295 lb 6.7 oz)   SpO2 (!) 94%   BMI 37.93 kg/m²     General Appearance:    Alert, cooperative, no distress, appears stated age   Head:    Normocephalic, without obvious abnormality, atraumatic   Eyes:    PER, conjunctiva/corneas clear, EOM's intact in both eyes        Throat:   Lips, mucosa, and tongue normal; teeth and gums normal   Back:     Symmetric, no curvature, ROM normal, no CVA tenderness   Lungs:     Clear to auscultation bilaterally, respirations unlabored   Chest wall:    No tenderness or deformity   Heart:    Regular rate and rhythm, S1 and S2 normal, no murmur, rub   or gallop   Abdomen:     Soft, non-tender, bowel sounds active all four quadrants,     no masses, no organomegaly   Extremities:   Extremities normal, atraumatic, no cyanosis or edema   Pulses:   2+ and symmetric all extremities   MSK:   No joint or muscle swelling, tenderness or deformity   Skin:   Skin color, texture, turgor normal, no rashes or lesions   Neurologic:   CNII-XII intact, normal strength and sensation       Throughout.  No flap     Results:  Lab Results   Component Value Date     (L) 09/20/2024    K 5.0 09/20/2024    CL 95 09/20/2024    CO2 26 09/20/2024    BUN 42 (H) 09/20/2024    CREATININE 13.4 (H) 09/20/2024    CALCIUM 8.3 (L) 09/20/2024    ANIONGAP 14 09/20/2024       Lab Results   Component Value Date    CALCIUM 8.3 (L) 09/20/2024    PHOS 5.2 (H) 09/20/2024       Recent Labs   Lab 09/20/24  0153   WBC 13.02*   RBC 2.35*   HGB 7.3*   HCT 22.5*      MCV 96   MCH 31.1*   MCHC 32.4          I have personally reviewed pertinent radiological imaging and reports.    Mariano Hickey MD  [facility] " [place]  [phone]

## 2024-09-20 NOTE — PLAN OF CARE
Problem: Adult Inpatient Plan of Care  Goal: Plan of Care Review  Outcome: Progressing  Goal: Patient-Specific Goal (Individualized)  Outcome: Progressing  Goal: Absence of Hospital-Acquired Illness or Injury  Outcome: Progressing  Goal: Optimal Comfort and Wellbeing  Outcome: Progressing  Goal: Readiness for Transition of Care  Outcome: Progressing     Problem: Wound  Goal: Optimal Coping  Outcome: Progressing  Goal: Optimal Functional Ability  Outcome: Progressing  Goal: Absence of Infection Signs and Symptoms  Outcome: Progressing  Goal: Improved Oral Intake  Outcome: Progressing  Goal: Optimal Pain Control and Function  Outcome: Progressing  Goal: Skin Health and Integrity  Outcome: Progressing  Goal: Optimal Wound Healing  Outcome: Progressing     Problem: Infection  Goal: Absence of Infection Signs and Symptoms  Outcome: Progressing     Problem: Skin Injury Risk Increased  Goal: Skin Health and Integrity  Outcome: Progressing     Problem: Hemodialysis  Goal: Safe, Effective Therapy Delivery  Outcome: Progressing  Goal: Effective Tissue Perfusion  Outcome: Progressing  Goal: Absence of Infection Signs and Symptoms  Outcome: Progressing     Problem: Fall Injury Risk  Goal: Absence of Fall and Fall-Related Injury  Outcome: Progressing     Problem: Violence Risk or Actual  Goal: Anger and Impulse Control  Outcome: Progressing

## 2024-09-20 NOTE — PROGRESS NOTES
[facility] Medicine  Progress Note    Patient Name: João Ham  MRN: [id number]  Patient Class: IP- Inpatient   Admission Date: 9/17/2024  Length of Stay: 3 days  Attending Physician: Wilfrido Rich MD  Primary Care Provider: Dawson Granado MD        Subjective:     Principal Problem:Inguinal hernia of left side with gangrene        HPI:  Patient is a 44 year old male with history of ESDR on dialysis MWF, awaiting kidney transplant, HTN, presented to ED with 3 days history of left groin pain and swelling. States swelling comes every time he cough and is being painful. Patient has low grade fever 99s at home. He has been vomiting bilious fluid last 2-3 days. No diarrhea or abdominal pain.     In the ED CT abdomen showed Left inguinal hernia containing fat and air as well as marked inflammation extending from inside the pelvis, in the hernia and down into the left scrotum. This is consistent with an infected inguinal hernia, possible gangrene. WBC was 14, patient was tachycardic. General surgery was consulted and patient was admitted under hospitalist.     Overview/Hospital Course:  Patient is a 44 year old male with history of ESRD, was admitted with suspected inguinal hernia gangrene. General surgery was consulted and patient was taken to OR. Found to have colonic perforation due to mesh erosion with an abscess. Patient underwent sigmoid resection and drainage of the abscess. Patient was started on clindamycin, Vancomycin and cefepime. Nephrology was consulted for chronic dialysis. However patient's AVF was not functioning. General surgery consulted again and trialysis catheter being placed today. Patient is getting dialysis and transfusion during dialysis for low Hb 6.6 and being transferred to [facility] for vascular evaluation.     Interval History:  Patient is seen and examined.  Transferred from [facility] for left arm AV fistula declotting.  Patient does not want  to be seen by Dr. Khoobehi from vascular surgery.  Call transfer center.  Patient reports postop surgical pain in left groin.  Currently afebrile.      Review of Systems  Objective:     Vital Signs (Most Recent):  Temp: 98.2 °F (36.8 °C) (09/20/24 0807)  Pulse: 84 (09/20/24 0807)  Resp: 16 (09/20/24 0818)  BP: 113/83 (09/20/24 0807)  SpO2: (!) 93 % (ra) (09/20/24 0807) Vital Signs (24h Range):  Temp:  [97 °F (36.1 °C)-100.4 °F (38 °C)] 98.2 °F (36.8 °C)  Pulse:  [] 84  Resp:  [16-18] 16  SpO2:  [93 %-100 %] 93 %  BP: ()/(43-83) 113/83     Weight: 134 kg (295 lb 6.7 oz)  Body mass index is 37.93 kg/m².    Intake/Output Summary (Last 24 hours) at 9/20/2024 0841  Last data filed at 9/20/2024 0604  Gross per 24 hour   Intake 1765.55 ml   Output 3940 ml   Net -2174.45 ml      Physical Exam  Vitals and nursing note reviewed.   Constitutional:       General: He is not in acute distress.     Appearance: He is obese. He is not toxic-appearing.   HENT:      Head: Atraumatic.      Mouth/Throat:      Mouth: Mucous membranes are moist.      Pharynx: Oropharynx is clear.   Eyes:      General: No scleral icterus.     Conjunctiva/sclera: Conjunctivae normal.      Pupils: Pupils are equal, round, and reactive to light.   Cardiovascular:      Rate and Rhythm: Regular rhythm.      Heart sounds: No murmur heard.  Pulmonary:      Effort: No respiratory distress.      Breath sounds: No wheezing, rhonchi or rales.   Abdominal:      General: Abdomen is flat. Bowel sounds are normal.      Palpations: Abdomen is soft.   Genitourinary:     Comments: There is left groin dressing and also laparoscopic scars in the abdomen   Musculoskeletal:         General: No swelling or deformity.      Cervical back: No rigidity or tenderness.   Skin:     Coloration: Skin is not jaundiced or pale.      Findings: No bruising, erythema or rash.   Neurological:      General: No focal deficit present.      Mental Status: He is alert and oriented to  person, place, and time.      Cranial Nerves: No cranial nerve deficit.      Sensory: No sensory deficit.      Motor: No weakness.   Psychiatric:         Mood and Affect: Mood normal.         Behavior: Behavior normal    Significant Labs: All pertinent labs within the past 24 hours have been reviewed.  CBC:   Recent Labs   Lab 09/19/24  0500 09/20/24  0153   WBC 10.77 13.02*   HGB 6.6* 7.3*   HCT 20.7* 22.5*    239     CMP:   Recent Labs   Lab 09/19/24  0500 09/20/24  0152 09/20/24  0153   * 133* 135*   K 4.4 4.9 5.0   CL 91* 94* 95   CO2 26 26 26   * 105 104   BUN 56* 42* 42*   CREATININE 17.6* 13.2* 13.4*   CALCIUM 8.3* 8.2* 8.3*   PROT 6.8 6.9 6.9   ALBUMIN 2.2* 3.2* 3.1*   BILITOT 0.5 0.6 0.6   ALKPHOS 84 71 71   AST 33 26 26   ALT 14 14 14   ANIONGAP 18* 13 14       Significant Imaging:     CT abdomen and pelvis without contrast:  Left inguinal hernia containing fat and air as well as marked inflammation extending from inside the pelvis, in the hernia and down into the left scrotum.  This is consistent with an infected inguinal hernia, possible gangrene.  Diverticulosis coli without CT evidence of diverticulitis.  2.4 cm cyst of the left kidney.  Small kidneys noted    CXR: No acute abnormality.     CXR:  Central line inserted ending in the superior vena cava. Hypoventilation.     Assessment/Plan:      * Inguinal hernia of left side with gangrene  Colonic perforation with abscess  CT abdomen shows Left inguinal hernia containing fat and air as well as marked inflammation extending from inside the pelvis, in the hernia and down into the left scrotum. This is consistent with an infected inguinal hernia, possible gangrene.   Patient was taken to OR and found to have colonic perforation due to mesh erosion. Patient underwent sigmoid resection and I&D on 9/17  - Cont Vancomycin, cefepime and Clindamycin  - diet per general surgery   - follow blood cultures and OR culture   -diet advancement as  per general surgery recommendations.      Dialysis AV fistula malfunction  Was unable to access AVF on 9/18  Trialysis catheter being placed on 9/19  Vascular surgery evaluation pending for declotting of AV fistula.      Essential hypertension  Chronic, controlled. Latest blood pressure and vitals reviewed-     Home meds for hypertension were reviewed and noted below.   Hypertension Medications               cloNIDine (CATAPRES) 0.3 MG tablet TAKE 1 TABLET BY MOUTH THREE TIMES DAILY    hydrALAZINE (APRESOLINE) 100 MG tablet Take 1 tablet (100 mg total) by mouth 3 (three) times daily.    isosorbide mononitrate (IMDUR) 120 MG 24 hr tablet Take 120 mg by mouth once daily.    metoprolol succinate (TOPROL-XL) 50 MG 24 hr tablet Take 150 mg by mouth once daily.    olmesartan (BENICAR) 40 MG tablet Take 1 tablet by mouth once daily            While in the hospital, will manage blood pressure as follows; Continue home antihypertensive regimen    Will utilize p.r.n. blood pressure medication only if patient's blood pressure greater than 140/90 and he develops symptoms such as worsening chest pain or shortness of breath.    Sleep apnea  CPAP per home settings       Pulmonary hypertension  Resume home meds       Anemia of chronic disease  Anemia is likely due to chronic disease due to ESRD. Most recent hemoglobin and hematocrit are listed below.  Recent Labs     09/18/24  0430 09/19/24  0500 09/20/24  0153   HGB 7.7* 6.6* 7.3*   HCT 23.9* 20.7* 22.5*       Plan  - Monitor serial CBC: Daily  - s/p 1 unit of pRBC on 9/19/24.    ESRD (end stage renal disease)  Nephrology consulted for routine dialysis MWF, AVF is not functional and tiralysis catheter was placed yesterday.  Awaiting transfer to another medical facility for AV fistula declotting.  Renal dose medications     Discussed with Dr. Hickey and transfer center.  Awaiting transfer to medical facility where vascular surgery can perform declotting of AV fistula.  VTE Risk  Mitigation (From admission, onward)           Ordered     heparin (porcine) injection 4,000 Units  As needed (PRN)         09/19/24 1806     heparin (porcine) injection 7,500 Units  Every 8 hours         09/17/24 1031     IP VTE HIGH RISK PATIENT  Once         09/17/24 1031     Place sequential compression device  Until discontinued         09/17/24 1031                    Discharge Planning   MARIA ISABEL:  TBD    Code Status: Full Code   Is the patient medically ready for discharge?:     Reason for patient still in hospital (select all that apply): Patient trending condition and Consult recommendations  Discharge Plan A: Home with family                  Wilfrido Rich MD  Department of Hospital Medicine   [facility]

## 2024-09-20 NOTE — NURSING
HD treatment completed via dialysis nurse. 2.6 L removed, patient tolerated well. Vital signs stable. [place] [facility] [place]

## 2024-09-20 NOTE — SIGNIFICANT EVENT
"CRITICAL UPDATE NOTE    Update Reason: Transfer from [facility]  RFA:  44 y.o. male admitted on 9/17/2024  for Inguinal hernia of left side with gangrene  Brief MedHx:   HTN  ESRD MWF pending transplant  Brief HPI:     2    Hospital Day: 3  Left groin pain, found in  [facility] ED to have an inguinal hernia with low grade fevers, vomiting bilious fluid. CT  showed air in the hernia. Will be evaluated by gen surg.          -- Kali Harris MD --        RECENT KEY DATA:    VITALS       Temp:  [97 °F (36.1 °C)-99.9 °F (37.7 °C)] 98.8 °F (37.1 °C)  Pulse:  [] 94  Resp:  [16-20] 18  SpO2:  [92 %-99 %] 93 %  BP: ()/(43-83) 108/56    Vitals:    09/19/24 1915 09/19/24 1936 09/19/24 1953 09/19/24 2129   BP: (!) 109/59 (!) 109/59  (!) 108/56   BP Location: Right forearm Right arm     Patient Position: Lying Lying     Pulse: 98 95 98 94   Resp: 18 18 16 18   Temp: 97 °F (36.1 °C) 99.9 °F (37.7 °C)  98.8 °F (37.1 °C)   TempSrc: Oral Oral  Oral   SpO2: 96% 96% (!) 94% (!) 93%   Weight:       Height:               LABS       Recent Labs   Lab 09/17/24  0851 09/18/24  0430 09/19/24  0500   WBC 14.64* 9.12 10.77   HGB 8.7* 7.7* 6.6*   HCT 27.2* 23.9* 20.7*    255 266   MONO 12.0  1.8* 14.0 15.8*  1.7*   EOSINOPHIL 0.0 0.0 1.2     No results for input(s): "APTT", "INR", "PTT" in the last 168 hours.     Recent Labs   Lab 09/17/24  0851 09/18/24  0429 09/19/24  0500    134* 135*   K 4.4 6.2* 4.4   CL 92* 93* 91*   CO2 27 21* 26   BUN 36* 47* 56*   CREATININE 14.5* 16.2* 17.6*   CALCIUM 9.2 8.3* 8.3*   PROT 8.6* 7.3 6.8   BILITOT 1.2* 0.8 0.5   ALKPHOS 72 63 84   ALT 10 9* 14   AST 14 16 33     Recent Labs   Lab 09/17/24  0851 09/18/24  0429 09/19/24  0500   MG 1.7 1.9 1.8        Recent Labs   Lab 09/17/24  0851   TROPONINI 0.047*        Lab Results   Component Value Date    COLORU Yellow 09/19/2024    COLORU Yellow 07/17/2024    COLORU Straw 06/19/2023    SPECGRAV 1.010 09/19/2024    SPECGRAV 1.020 07/17/2024 " "   SPECGRAV 1.010 06/19/2023    PHUR 8.0 09/19/2024    PHUR 6.0 07/17/2024    PHUR 6.0 06/19/2023    NITRITE Negative 09/19/2024    NITRITE Negative 07/17/2024    NITRITE Negative 06/19/2023    KETONESU Negative 09/19/2024    KETONESU Negative 07/17/2024    KETONESU Negative 06/19/2023    UROBILINOGEN Negative 09/19/2024    UROBILINOGEN Negative 07/17/2024    UROBILINOGEN Negative 10/10/2022      No results for input(s): "TSH", "T3FREE", "FREET4" in the last 168 hours.     No results for input(s): "PH", "PCO2", "PO2", "HCO3", "POCSATURATED", "BE" in the last 72 hours.          IMAGING     X-Ray Chest 1 View  Narrative: EXAMINATION:  XR CHEST 1 VIEW    CLINICAL HISTORY:  s/p dialysis catheter insertion;    TECHNIQUE:  Single frontal view of the chest was performed.    COMPARISON:  Chest x-ray of September 17, 2024    FINDINGS:  A central line has been placed entering at the left neck and ending at the origin of the superior vena cava.  There is hypoventilation.  The cardiac size is within normal limits.  No pneumothorax is seen.  Impression: Central line inserted ending in the superior vena cava.  Hypoventilation.    Electronically signed by: Everett Castellano MD  Date:    09/19/2024  Time:    13:55         ==============          Patient Data And Problem List      Past Medical History:   Diagnosis Date    Allergy     Anemia     Anxiety     CHF (congestive heart failure)     Depression     High blood pressure with chronic kidney disease, stage 5 chronic kidney disease or end stage renal disease     Hypertension      Active Diagnoses:    Diagnosis Date Noted POA    PRINCIPAL PROBLEM:  Inguinal hernia of left side with gangrene [K40.40] 09/17/2024 Yes    Dialysis AV fistula malfunction [T82.590A] 09/19/2024 No    Essential hypertension [I10] 07/17/2024 Yes    Sleep apnea [G47.30] 03/31/2023 Yes    Pulmonary hypertension [I27.20] 11/29/2022 Yes    ESRD (end stage renal disease) [N18.6] 10/10/2022 Yes    Anemia of chronic " disease [D63.8] 10/10/2022 Yes      Problems Resolved During this Admission:

## 2024-09-20 NOTE — ASSESSMENT & PLAN NOTE
Colonic perforation with abscess  CT abdomen shows Left inguinal hernia containing fat and air as well as marked inflammation extending from inside the pelvis, in the hernia and down into the left scrotum. This is consistent with an infected inguinal hernia, possible gangrene.   Patient was taken to OR and found to have colonic perforation due to mesh erosion. Patient underwent sigmoid resection and I&D on 9/17  - Cont Vancomycin, cefepime and Clindamycin  - diet per general surgery   - follow blood cultures and OR culture   -diet advancement as per general surgery recommendations.

## 2024-09-20 NOTE — PLAN OF CARE
Awaiting transfer to another [facility] facility for AVF declot.      09/20/24 1318   Discharge Reassessment   Assessment Type Discharge Planning Reassessment   Discharge Plan A Hospital Transfer

## 2024-09-20 NOTE — PROGRESS NOTES
Patient seen on dialysis.  He was resting comfortably.  Denies any nausea or vomiting.  Reports flatus and bowel movements.  He was discharged to this facility for evaluation by vascular surgery to evaluate previously placed AV fistula.    Wt Readings from Last 3 Encounters:   09/18/24 134 kg (295 lb 6.7 oz)   08/29/24 131.3 kg (289 lb 7.4 oz)   08/22/24 131 kg (288 lb 12.8 oz)     Temp Readings from Last 3 Encounters:   09/20/24 98.7 °F (37.1 °C) (Oral)   08/22/24 97.3 °F (36.3 °C) (Temporal)   07/30/24 98.6 °F (37 °C) (Oral)     BP Readings from Last 3 Encounters:   09/20/24 130/72   08/29/24 99/68   08/22/24 (!) 137/91     Pulse Readings from Last 3 Encounters:   09/20/24 87   08/29/24 (!) 115   08/22/24 110     AAOx3  Soft/dist/obese  BS noted    Lab Results   Component Value Date    WBC 13.02 (H) 09/20/2024    HGB 7.3 (L) 09/20/2024    HCT 22.5 (L) 09/20/2024    MCV 96 09/20/2024     09/20/2024       BMP  Lab Results   Component Value Date     (L) 09/20/2024    K 5.0 09/20/2024    CL 95 09/20/2024    CO2 26 09/20/2024    BUN 42 (H) 09/20/2024    CREATININE 13.4 (H) 09/20/2024    CALCIUM 8.3 (L) 09/20/2024    ANIONGAP 14 09/20/2024    EGFRNORACEVR 4.3 (A) 09/20/2024     Lab Results   Component Value Date    CRP >16.00 (H) 09/20/2024     A/P: s/p colectomy  Ambulate  Aggressive IS  Full liquid diet

## 2024-09-20 NOTE — ASSESSMENT & PLAN NOTE
Was unable to access AVF on 9/18  Trialysis catheter being placed on 9/19  Vascular surgery evaluation pending for declotting of AV fistula.

## 2024-09-20 NOTE — SUBJECTIVE & OBJECTIVE
Interval History:  Patient is seen and examined.  Transferred from [facility] for left arm AV fistula declotting.  Patient does not want to be seen by Dr. Khoobehi from vascular surgery.  Call transfer center.  Patient reports postop surgical pain in left groin.  Currently afebrile.      Review of Systems  Objective:     Vital Signs (Most Recent):  Temp: 98.2 °F (36.8 °C) (09/20/24 0807)  Pulse: 84 (09/20/24 0807)  Resp: 16 (09/20/24 0818)  BP: 113/83 (09/20/24 0807)  SpO2: (!) 93 % (ra) (09/20/24 0807) Vital Signs (24h Range):  Temp:  [97 °F (36.1 °C)-100.4 °F (38 °C)] 98.2 °F (36.8 °C)  Pulse:  [] 84  Resp:  [16-18] 16  SpO2:  [93 %-100 %] 93 %  BP: ()/(43-83) 113/83     Weight: 134 kg (295 lb 6.7 oz)  Body mass index is 37.93 kg/m².    Intake/Output Summary (Last 24 hours) at 9/20/2024 0841  Last data filed at 9/20/2024 0604  Gross per 24 hour   Intake 1765.55 ml   Output 3940 ml   Net -2174.45 ml      Physical Exam  Vitals and nursing note reviewed.   Constitutional:       General: He is not in acute distress.     Appearance: He is obese. He is not toxic-appearing.   HENT:      Head: Atraumatic.      Mouth/Throat:      Mouth: Mucous membranes are moist.      Pharynx: Oropharynx is clear.   Eyes:      General: No scleral icterus.     Conjunctiva/sclera: Conjunctivae normal.      Pupils: Pupils are equal, round, and reactive to light.   Cardiovascular:      Rate and Rhythm: Regular rhythm.      Heart sounds: No murmur heard.  Pulmonary:      Effort: No respiratory distress.      Breath sounds: No wheezing, rhonchi or rales.   Abdominal:      General: Abdomen is flat. Bowel sounds are normal.      Palpations: Abdomen is soft.   Genitourinary:     Comments: There is left groin dressing and also laparoscopic scars in the abdomen   Musculoskeletal:         General: No swelling or deformity.      Cervical back: No rigidity or tenderness.   Skin:     Coloration: Skin is not jaundiced or  pale.      Findings: No bruising, erythema or rash.   Neurological:      General: No focal deficit present.      Mental Status: He is alert and oriented to person, place, and time.      Cranial Nerves: No cranial nerve deficit.      Sensory: No sensory deficit.      Motor: No weakness.   Psychiatric:         Mood and Affect: Mood normal.         Behavior: Behavior normal    Significant Labs: All pertinent labs within the past 24 hours have been reviewed.  CBC:   Recent Labs   Lab 09/19/24  0500 09/20/24  0153   WBC 10.77 13.02*   HGB 6.6* 7.3*   HCT 20.7* 22.5*    239     CMP:   Recent Labs   Lab 09/19/24  0500 09/20/24  0152 09/20/24  0153   * 133* 135*   K 4.4 4.9 5.0   CL 91* 94* 95   CO2 26 26 26   * 105 104   BUN 56* 42* 42*   CREATININE 17.6* 13.2* 13.4*   CALCIUM 8.3* 8.2* 8.3*   PROT 6.8 6.9 6.9   ALBUMIN 2.2* 3.2* 3.1*   BILITOT 0.5 0.6 0.6   ALKPHOS 84 71 71   AST 33 26 26   ALT 14 14 14   ANIONGAP 18* 13 14       Significant Imaging:     CT abdomen and pelvis without contrast:  Left inguinal hernia containing fat and air as well as marked inflammation extending from inside the pelvis, in the hernia and down into the left scrotum.  This is consistent with an infected inguinal hernia, possible gangrene.  Diverticulosis coli without CT evidence of diverticulitis.  2.4 cm cyst of the left kidney.  Small kidneys noted    CXR: No acute abnormality.     CXR:  Central line inserted ending in the superior vena cava. Hypoventilation.

## 2024-09-20 NOTE — CARE UPDATE
09/20/24 0242   Patient Assessment/Suction   Level of Consciousness (AVPU) alert   Respiratory Effort Normal   Expansion/Accessory Muscles/Retractions no use of accessory muscles   All Lung Fields Breath Sounds clear   Skin Integrity   $ Wound Care Tech Time 15 min   Area Observed Bridge of nose   Skin Appearance without discoloration   PRE-TX-O2   Device (Oxygen Therapy) BIPAP   $ Is the patient on High Flow Oxygen? Yes   Oxygen Concentration (%) 40   SpO2 100 %   Pulse Oximetry Type Intermittent   $ Pulse Oximetry - Multiple Charge Pulse Oximetry - Multiple   Pulse 86   Resp 16   Ready to Wean/Extubation Screen   FIO2<=50 (chart decimal) 0.4   Preset CPAP/BiPAP Settings   Mode Of Delivery BiPAP   $ CPAP/BiPAP Daily Charge BiPAP/CPAP Daily   $ Initial CPAP/BiPAP Setup? Yes   $ Is patient using? Yes   Size of Mask Large   Sized Appropriately? Yes   Equipment Type V60   Ipap 10   EPAP (cm H2O) 5   Pressure Support (cm H2O) 5   Set Rate (Breaths/Min) 12   Flow Rate (L/Min) 1   ITime (sec) 3   Patient CPAP/BiPAP Settings   Timed Inspiration (Sec) 1   IPAP Rise Time (sec) 3   RR Total (Breaths/Min) 16   Tidal Volume (mL) 642   VE Minute Ventilation (L/min) 10.6 L/min   Peak Inspiratory Pressure (cm H2O) 10   TiTOT (%) 31   Total Leak (L/Min) 0   Patient Trigger - ST Mode Only (%) 75   Education   $ Education BiPAP;15 min   Tobacco Cessation Intervention   Do you use any type of tobacco product? No   Respiratory Evaluation   $ Care Plan Tech Time 15 min   $ Respiratory Evaluation Complete   Evaluation For New Orders   Admitting Diagnosis HERNIA   Pulmonary Diagnosis CLOVIS   Home Oxygen   Has Home Oxygen? No   Home Aerosol, MDI, DPI, and Other Treatments/Therapies   Home Respiratory Therapy Per Patient/Review of Chart Yes   Other Home Respiratory Therapies QHS-BIPAP   Oxygen Care Plan   Oxygen Care Plan Per Protocol   SPO2 Goal (%) 92% non-cardiac   Rationale SpO2 is <92% (Non-cardiac Pt.)   Bronchodilator Care Plan    Bronchodilator Care Plan N/A   Rationale No Rationale found   Atelectasis Care Plan   Atelectasis Care Plan Other   Rationale No Rational Found   Airway Clearance Care Plan   Airway Clearance Care Plan Other   Rationale No rationale found

## 2024-09-20 NOTE — OP NOTE
Date of procedure: September 17, 2024     Staff surgeon:  Dr. Galileo Connors     Preoperative diagnosis:  Incarcerated hernia     Postop diagnosis:  Left inguinal mesh erosion into sigmoid diverticulum  Left inguinal canal abscess     Procedure:   Robotic sigmoid colectomy with isoperistaltic anastomosis   Left inguinal canal exploration with drainage of abscess    Anesthesia: General endotracheal anesthesia     Indication for procedure:  This is a 44-year-old gentleman who presented to the hospital with left scrotal and inguinal pain as well as abdominal pain for the last 5 days.  A CT scan was done in the ER demonstrating findings consistent with a incarcerated hernia with air within the inguinal canal.  This was felt concerning for a possible gas-forming infection.  On closer examination there does appear to be  colon  Near the inguinal canal.  He was scheduled for robotic exploration of the abdomen with the indicated procedure and possible groin exploration     Description procedure:   Following signing informed consent he was taken the operating room placed supine position.  General endotracheal anesthesia was administered.  Sams catheter was inserted.  The abdomen is prepped and draped standard fashion.  A time-out procedure was performed.  I make a small vertical incision above the umbilicus and carried out the deep dermal tissue.  Veress needle was used to enter the abdominal cavity.  Next an 8 mm robotic trocar was placed under direct visualization.  The patient was placed in steep Trendelenburg.  From this she was obvious that the anti mesenteric border of the sigmoid colon was plastered to the inguinal canal.  It does have the appearance of incarceration of this anti mesenteric border into the hernia.  Next I placed 2 8 mm robotic trocar was bilaterally in the midclavicular line under direct visualization.  The robot is docked.  I began by attempting to reduce in separate the colon from the inguinal canal.   There is a proximally 1 in portion of the anti mesenteric border of the sigmoid colon that has plastered stuck within the inguinal canal and what appears to be a hernia.  I am unable to reduce this.  Upon dissection and attempts to reduce it it becomes apparent that there is an obvious perforation of the colon with erosion into a piece of mesh it has been previously placed.  At this point I begin to dissected the preperitoneal space by scoring the peritoneum at the level the ASIS.  I dissected the preperitoneal space and make several attempts to reduce this colon from this inguinal canal.  Care was taken to preserve the spermatic cord.  There is significant induration and thickening of the spermatic cord which I am unable to adequately dissect robotically.  Ultimately I am able to separate and reduce the sigmoid colon by cutting the mesh around the area.  Having reduce the colon back into abdominal wall I freed along the white line of Toldt proximally and distally.  Initial plan was to transect the area of colon that was stuck and adherent to the mesh.  Unfortunately the robotic stapler would not staple across this.  Next the decision was made to resect a proximally 4 in segment of the sigmoid colon.  I make a defect in the mesentery proximal and distal to this area.  A staple across this with the robotic stapler.  The mesentery was ligated with the robotic vessel sealer.  The specimen and resected mesh is removed in an Endo-Catch bag.  I then mobilized the proximal colon as well as the distal colon.  ICG was given to ensure adequate perfusion.  These 2 limbs of bowel are aligned and an isoperistaltic fashion.  On the distal portion of the colon I make a colotomy a proximally 7 cm distal to the staple line.  On the proximal portion I make a colotomy a proximally 2 cm proximal to the staple line.  The bowel was aligned and stapled in an isoperistaltic fashion.  The common colotomy was then sewn with a running barbed  suture without event.  I irrigate ensure adequate hemostasis.  I do secured the peritoneum back to the abdominal wall uneventfully.  The robot was undocked in all trocars were removed under direct visualization.  The supraumbilical fascia was closed with 0 Vicryl sutures.  Next I turned my attention to the left inguinal canal in the induration in this area.  Make a lengthy incision dissect down through the deep dermal tissue down to the soft tissue.  My incision was carried down through the fascia into the inguinal canal.  There is significant induration and thickening of this tissue.  I do drain a mild amount of purulent fluid.  There is significant thickening of the spermatic cord extending down into the testicle.  The testicle was fully exposed.  There is perhaps a small indirect hernia resected from this.  However most of the induration I think it was secondary to chronic inflammation from the mesh erosion into the sigmoid colon.  I leave a Penrose drain extending from this incision down to the lining of the abdominal cavity.  Another Penrose drains placed this incision and brought out through a separate stab incision just cephalad of the scrotum.  The areas irrigated with copious amounts of fluid.  A mild amount of necrotic fat was resected.  Having irrigated the tissue copiously the wound was closed loosely with multiple nylon sutures.  Patient was extubated taken recovery room stable condition.  There were no immediate complications.  Blood loss was 25 cc

## 2024-09-20 NOTE — NURSING
Report called to Pilar at [facility]. Patient transferred to room 1210 via [facility]. All belongings sent with patient. VSS. Khushboo Guidry, patient's grandmother notified of transfer and room number. Tele box removed, monitor room notified, and box returned to monitor room.

## 2024-09-20 NOTE — H&P
"   [facility]         HISTORY & PHYSICAL   HOSPITALIST ADMISSION NOTE    Admitting MD: Kali Harris MD             Patient Name:João Ham  Patient :1980   Patient Age/Gender: 44 y.o. male  MRN:[id number]  CSN:[id number]  Status: IP- Inpatient  Admission Date:2024  Date of Service:2024               .HISTORY OF PRESENT ILLNESS   Chief Complaint:   Chief Complaint   Patient presents with    Fatigue    Groin Swelling     X 2 days.  Hx of Kidney failure and CHF        Principal Problem:   Inguinal hernia of left side with gangrene    Patient Information Source(s):      {info source:[id number]::"ER records"}.    HPI:  João Ham is a 44 y.o. male who has a history of    and presents with     Patient is a 44 year old male with history of ESDR on dialysis MWF, awaiting kidney transplant, HTN, presented to ED with 3 days history of left groin pain and swelling. States swelling comes every time he cough and is being painful. Patient has low grade fever 99s at home. He has been vomiting bilious fluid last 2-3 days. No diarrhea or abdominal pain.     In the ED CT abdomen showed Left inguinal hernia containing fat and air as well as marked inflammation extending from inside the pelvis, in the hernia and down into the left scrotum. This is consistent with an infected inguinal hernia, possible gangrene. WBC was 14, patient was tachycardic. General surgery was consulted and patient was admitted under hospitalist.                  .REVIEW OF SYSTEMS.   Review of Systems    All other ROS other than above negative           . MEDICAL HISTORY.   PCP: Dawson Granado MD  Specialists:    Past Medical History    Past Medical History:   Diagnosis Date    Allergy     Anemia     Anxiety     CHF (congestive heart failure)     Depression     High blood pressure with chronic kidney disease, stage 5 chronic kidney disease or end stage renal disease     Hypertension        Past Surgical " History     has a past surgical history that includes Hernia repair (Right); insertion, catheter, tunneled (N/A, 6/22/2023); Fistulogram (Bilateral, 4/30/2024); Percutaneous transluminal angioplasty of arteriovenous fistula (Left, 4/30/2024); Phlebography (N/A, 7/19/2024); removal, tunneled cath (Right, 7/19/2024); Robot-assisted laparoscopic resection of sigmoid colon using da Zoey Xi (N/A, 9/17/2024); and Abscess drainage (Left, 9/17/2024).  Past Surgical History:   Procedure Laterality Date    ABSCESS DRAINAGE Left 9/17/2024    Procedure: DRAINAGE, ABSCESS, GROIN;  Surgeon: Galileo Connors MD;  Location: [facility] OR;  Service: General;  Laterality: Left;    FISTULOGRAM Bilateral 4/30/2024    Procedure: Fistulogram;  Surgeon: Khoobehi, Ali, MD;  Location: [facility] CATH/EP LAB;  Service: Vascular;  Laterality: Bilateral;    HERNIA REPAIR Right     With mesh    INSERTION, CATHETER, TUNNELED N/A 6/22/2023    Procedure: Insertion,catheter,tunneled;  Surgeon: Everett Caicedo MD;  Location: [facility] OR;  Service: General;  Laterality: N/A;    PERCUTANEOUS TRANSLUMINAL ANGIOPLASTY OF ARTERIOVENOUS FISTULA Left 4/30/2024    Procedure: PTA, AV FISTULA;  Surgeon: Khoobehi, Ali, MD;  Location: [facility] CATH/EP LAB;  Service: Vascular;  Laterality: Left;    PHLEBOGRAPHY N/A 7/19/2024    Procedure: Venogram;  Surgeon: Khoobehi, Ali, MD;  Location: [facility] CATH/EP LAB;  Service: Vascular;  Laterality: N/A;    REMOVAL, TUNNELED CATH Right 7/19/2024    Procedure: REMOVAL, TUNNELED CATH;  Surgeon: Khoobehi, Ali, MD;  Location: [facility] CATH/EP LAB;  Service: Vascular;  Laterality: Right;    ROBOT-ASSISTED LAPAROSCOPIC RESECTION OF SIGMOID COLON USING DA ZOEY XI N/A 9/17/2024    Procedure: XI ROBOTIC SIGMOID RESECTION, WITH ANASTAMOSIS;  Surgeon: Galileo Connors MD;  Location: [facility] OR;  Service: General;  Laterality: N/A;  possible open have instruments available         Social  History     reports that he has never smoked. He has never been  exposed to tobacco smoke. He has never used smokeless tobacco. He reports that he does not drink alcohol and does not use drugs.  Tobacco Use    Smoking status: Never     Passive exposure: Never    Smokeless tobacco: Never   Substance and Sexual Activity    Alcohol use: Never    Drug use: Never    Sexual activity: Not Currently         Family History    family history is not on file.     Family History    None                    . ALLERGIES.       Review of patient's allergies indicates:   Allergen Reactions    Mushroom Swelling     Tongue swells    Tongue swells       Tongue swells              . MEDICATIONS.   Home Medications:       No current facility-administered medications on file prior to encounter.     Current Outpatient Medications on File Prior to Encounter   Medication Sig    apixaban (ELIQUIS) 2.5 mg Tab Take 2.5 mg by mouth 2 (two) times daily.    calcitRIOL (ROCALTROL) 0.25 MCG Cap Take 1 capsule by mouth once daily (Patient taking differently: Take 0.25 mcg by mouth once daily.)    cholecalciferol, vitamin D3, 125 mcg (5,000 unit) Tab Take 1 tablet by mouth once daily.    cloNIDine (CATAPRES) 0.3 MG tablet TAKE 1 TABLET BY MOUTH THREE TIMES DAILY    isosorbide mononitrate (IMDUR) 120 MG 24 hr tablet Take 120 mg by mouth once daily.    metoprolol succinate (TOPROL-XL) 50 MG 24 hr tablet Take 150 mg by mouth once daily.    olmesartan (BENICAR) 40 MG tablet Take 1 tablet by mouth once daily    sevelamer carbonate (RENVELA) 800 mg Tab Take 2 tablets (1,600 mg total) by mouth 3 (three) times daily with meals.    calcium carbonate (TUMS) 200 mg calcium (500 mg) chewable tablet Take 2 tablets (1,000 mg total) by mouth 3 (three) times daily with meals. (Patient taking differently: Take 1,000 mg by mouth 3 (three) times daily.)    hydrALAZINE (APRESOLINE) 100 MG tablet Take 1 tablet (100 mg total) by mouth 3 (three) times daily.                  . PHYSICAL EXAM.         Vital Signs (24h Range): Vital Signs  "(Most Recent):   Temp:  [97 °F (36.1 °C)-99.9 °F (37.7 °C)] 98.8 °F (37.1 °C)  Pulse:  [] 94  Resp:  [16-20] 18  SpO2:  [92 %-100 %] 93 %  BP: ()/(43-83) 108/56 Temp: 98.8 °F (37.1 °C) (09/19/24 2129)  Pulse: 94 (09/19/24 2129)  Resp: 18 (09/19/24 2129)  BP: (!) 108/56 (09/19/24 2129)  SpO2: (!) 93 % (09/19/24 2129)      Vitals:    09/19/24 1915 09/19/24 1936 09/19/24 1953 09/19/24 2129   BP: (!) 109/59 (!) 109/59  (!) 108/56   BP Location: Right forearm Right arm     Patient Position: Lying Lying     Pulse: 98 95 98 94   Resp: 18 18 16 18   Temp: 97 °F (36.1 °C) 99.9 °F (37.7 °C)  98.8 °F (37.1 °C)   TempSrc: Oral Oral  Oral   SpO2: 96% 96% (!) 94% (!) 93%   Weight:       Height:               Weight: 134 kg (295 lb 6.7 oz)  Body mass index is 37.93 kg/m².  Wt Readings from Last 3 Encounters:   09/18/24 134 kg (295 lb 6.7 oz)   08/29/24 131.3 kg (289 lb 7.4 oz)   08/22/24 131 kg (288 lb 12.8 oz)         --    Physical Exam                DIAGNOSTIC DATA   Summary    Laboratory Studies:    CBC BMP   Recent Labs   Lab 09/19/24  0500   WBC 10.77   HGB 6.6*   HCT 20.7*       Recent Labs   Lab 09/19/24  0500   *   K 4.4   CL 91*   CO2 26   BUN 56*   CREATININE 17.6*   CALCIUM 8.3*           {Results:38176::"."}    Other Studies:          ------  Diagnostic Data Details          CBC  10.77 6.6 266    20.7     Coag                 BMP  135 91 56 114   4.4 26 17.6     CaMgPhos  8.3   1.8          Last labs from current encounter as of 09/19/2024-9:33 PM             Laboratory Studies:    Blood Gas:  No results for input(s): "PH", "PCO2", "PO2", "HCO3", "POCSATURATED", "BE" in the last 72 hours.      Recent Labs   Lab 09/17/24  0851 09/18/24  0430 09/19/24  0500   WBC 14.64* 9.12 10.77   HGB 8.7* 7.7* 6.6*   HCT 27.2* 23.9* 20.7*    255 266   MONO 12.0  1.8* 14.0 15.8*  1.7*   EOSINOPHIL 0.0 0.0 1.2     No results for input(s): "APTT", "INR", "PTT" in the last 168 hours.     Recent Labs " "  Lab 09/17/24  0851 09/18/24  0429 09/19/24  0500    134* 135*   K 4.4 6.2* 4.4   CL 92* 93* 91*   CO2 27 21* 26   BUN 36* 47* 56*   CREATININE 14.5* 16.2* 17.6*   CALCIUM 9.2 8.3* 8.3*   PROT 8.6* 7.3 6.8   BILITOT 1.2* 0.8 0.5   ALKPHOS 72 63 84   ALT 10 9* 14   AST 14 16 33     Recent Labs   Lab 09/17/24  0851 09/18/24  0429 09/19/24  0500   MG 1.7 1.9 1.8        Recent Labs   Lab 09/17/24  0851   TROPONINI 0.047*             Lab Results   Component Value Date    COLORU Yellow 09/19/2024    COLORU Yellow 07/17/2024    COLORU Straw 06/19/2023    SPECGRAV 1.010 09/19/2024    SPECGRAV 1.020 07/17/2024    SPECGRAV 1.010 06/19/2023    PHUR 8.0 09/19/2024    PHUR 6.0 07/17/2024    PHUR 6.0 06/19/2023    NITRITE Negative 09/19/2024    NITRITE Negative 07/17/2024    NITRITE Negative 06/19/2023    KETONESU Negative 09/19/2024    KETONESU Negative 07/17/2024    KETONESU Negative 06/19/2023    UROBILINOGEN Negative 09/19/2024    UROBILINOGEN Negative 07/17/2024    UROBILINOGEN Negative 10/10/2022      No results for input(s): "TSH", "T3FREE", "FREET4" in the last 168 hours.      Lab Results   Component Value Date    HGBA1C 6.1 07/16/2024    HGBA1C 5.5 06/22/2023    HGBA1C 5.5 10/10/2022     The 10-year ASCVD risk score (Tyrone AREVALO, et al., 2019) is: 4.3%    Values used to calculate the score:      Age: 44 years      Sex: Male      Is Non- : Yes      Diabetic: No      Tobacco smoker: No      Systolic Blood Pressure: 108 mmHg      Is BP treated: Yes      HDL Cholesterol: 39 mg/dL      Total Cholesterol: 141 mg/dL           ECG:        RADIOLOGY STUDIES:     X-Ray Chest 1 View  Narrative: EXAMINATION:  XR CHEST 1 VIEW    CLINICAL HISTORY:  s/p dialysis catheter insertion;    TECHNIQUE:  Single frontal view of the chest was performed.    COMPARISON:  Chest x-ray of September 17, 2024    FINDINGS:  A central line has been placed entering at the left neck and ending at the origin of the superior vena " cava.  There is hypoventilation.  The cardiac size is within normal limits.  No pneumothorax is seen.  Impression: Central line inserted ending in the superior vena cava.  Hypoventilation.    Electronically signed by: Everett Castellano MD  Date:    09/19/2024  Time:    13:55          CARDIAC DIAGNOSTIC INFORMATION REVIEW:  Most Recent Echocardiogram:  Results for orders placed during the hospital encounter of 07/15/24    Echo    Interpretation Summary    Limited echo only for EF and to look at valves due to bacteremia.    Left Ventricle: The left ventricle is normal in size. Increased wall thickness. There is concentric remodeling. There is normal systolic function with a visually estimated ejection fraction of 55 - 60%.    Aortic Valve: There is no mass/vegetation present.    Mitral Valve: There is no mass/vegetation present.    Tricuspid Valve: There is no mass/vegetation present.    Pulmonic Valve: There is no mass/vegetation present.      Last Cardiac Cath  Results for orders placed during the hospital encounter of 07/15/24    Cardiac catheterization    Narrative  Procedure performed in the Invasive Lab  - See Procedure Log link below for nursing documentation  - See OpNote on Surgeries Tab for physician findings  - See Imaging Tab for radiologist dictation        Last Stress Test   Results for orders placed during the hospital encounter of 02/16/23    Lexiscan Card Interp Cupid Stress test with myocardial perfusion    Interpretation Summary    Normal myocardial perfusion scan. There is no evidence of myocardial ischemia or infarction.    The visually estimated ejection fraction is normal at rest and normal during stress.    There is normal wall motion at rest and post stress.    LV cavity size is normal at rest and normal at stress.    The ECG portion of the study is negative for ischemia.    The patient reported no chest pain during the stress test.    There were no arrhythmias during stress.    There are no  prior studies for comparison.          -------                     . ASSESSMENT & PLAN.   Patient Summary:       Patient Problem List:  Active Diagnoses:    Diagnosis Date Noted POA    PRINCIPAL PROBLEM:  Inguinal hernia of left side with gangrene [K40.40] 09/17/2024 Yes    Dialysis AV fistula malfunction [T82.590A] 09/19/2024 No    Essential hypertension [I10] 07/17/2024 Yes    Sleep apnea [G47.30] 03/31/2023 Yes    Pulmonary hypertension [I27.20] 11/29/2022 Yes    ESRD (end stage renal disease) [N18.6] 10/10/2022 Yes    Anemia of chronic disease [D63.8] 10/10/2022 Yes      Problems Resolved During this Admission:         Current Medications:       9/17/2024   Medications   vancomycin - pharmacy to dose (has no administration in time range)   clindamycin in D5W 900 mg/50 mL IVPB 900 mg (0 mg Intravenous Stopped 9/19/24 1539)   ceFEPIme (MAXIPIME) 2 g in D5W 100 mL IVPB (MB+) (0 g Intravenous Stopped 9/19/24 1227)   cloNIDine tablet 0.3 mg (0 mg Oral Hold 9/19/24 1500)   hydrALAZINE tablet 100 mg (0 mg Oral Hold 9/19/24 1500)   isosorbide mononitrate 24 hr tablet 120 mg (120 mg Oral Given 9/19/24 0950)   metoprolol succinate (TOPROL-XL) 24 hr tablet 150 mg (150 mg Oral Given 9/19/24 0903)   valsartan tablet 320 mg (320 mg Oral Given 9/19/24 0903)   sevelamer carbonate tablet 1,600 mg (1,600 mg Oral Not Given 9/19/24 1715)   sodium chloride 0.9% flush 3 mL (has no administration in time range)   melatonin tablet 6 mg (6 mg Oral Given 9/18/24 2334)   senna-docusate 8.6-50 mg per tablet 1 tablet (1 tablet Oral Given 9/19/24 0904)   acetaminophen tablet 650 mg (has no administration in time range)   aluminum-magnesium hydroxide-simethicone 200-200-20 mg/5 mL suspension 30 mL (30 mLs Oral Given 9/18/24 2118)   acetaminophen tablet 650 mg (has no administration in time range)   potassium bicarbonate disintegrating tablet 50 mEq (has no administration in time range)   potassium bicarbonate disintegrating tablet 35 mEq (has  no administration in time range)   potassium bicarbonate disintegrating tablet 60 mEq (has no administration in time range)   magnesium oxide tablet 800 mg (has no administration in time range)   magnesium oxide tablet 800 mg (has no administration in time range)   potassium, sodium phosphates 280-160-250 mg packet 2 packet (has no administration in time range)   potassium, sodium phosphates 280-160-250 mg packet 2 packet (has no administration in time range)   potassium, sodium phosphates 280-160-250 mg packet 2 packet (has no administration in time range)   glucose chewable tablet 16 g (has no administration in time range)   glucose chewable tablet 24 g (has no administration in time range)   glucagon (human recombinant) injection 1 mg (has no administration in time range)   heparin (porcine) injection 7,500 Units (7,500 Units Subcutaneous Given 9/19/24 1451)   insulin aspart U-100 pen 0-5 Units (has no administration in time range)   dextrose 10% bolus 125 mL 125 mL (has no administration in time range)   dextrose 10% bolus 250 mL 250 mL (has no administration in time range)   HYDROcodone-acetaminophen 5-325 mg per tablet 1 tablet (1 tablet Oral Given 9/18/24 2334)   naloxone 0.4 mg/mL injection 0.02 mg (has no administration in time range)   ondansetron injection 4 mg (4 mg Intravenous Given 9/17/24 2201)   HYDROmorphone injection 1 mg (1 mg Intravenous Given 9/18/24 2123)   gabapentin capsule 200 mg (200 mg Oral Given 9/19/24 0904)   acetaminophen 1,000 mg/100 mL (10 mg/mL) injection 1,000 mg (0 mg Intravenous Stopped 9/18/24 1523)   bisacodyL EC tablet 5 mg (5 mg Oral Given 9/18/24 2118)   mupirocin 2 % ointment (1 g Nasal Given 9/19/24 0904)   0.9%  NaCl infusion (has no administration in time range)   sodium chloride 0.9% bolus 250 mL 250 mL (has no administration in time range)   epoetin mily-epbx injection 10,000 Units (10,000 Units Intravenous Not Given 9/18/24 1415)   sodium zirconium cyclosilicate  packet 10 g (0 g Oral Hold 9/19/24 1500)   dextrose 10% bolus 125 mL 125 mL (has no administration in time range)   dextrose 10% bolus 250 mL 250 mL (0 mLs Intravenous Stopped 9/18/24 2143)   dextrose 10% bolus 125 mL 125 mL (has no administration in time range)   dextrose 10% bolus 250 mL 250 mL (has no administration in time range)   0.9%  NaCl infusion (for blood administration) (has no administration in time range)   0.9%  NaCl infusion (for blood administration) (has no administration in time range)   0.9%  NaCl infusion (for blood administration) (has no administration in time range)   heparin (porcine) injection 4,000 Units (4,000 Units Intravenous Given 9/19/24 1904)   HYDROcodone-acetaminophen 5-325 mg per tablet 1 tablet (1 tablet Oral Given 9/17/24 0920)   lactated ringers bolus 500 mL (0 mLs Intravenous Stopped 9/17/24 1129)   clindamycin in D5W 900 mg/50 mL IVPB 900 mg (0 mg Intravenous Stopped 9/17/24 1120)   vancomycin 750 mg in D5W 250 mL IVPB (admixture device) (750 mg Intravenous New Bag 9/17/24 1422)   vancomycin 750 mg in D5W 250 mL IVPB (admixture device) (0 mg Intravenous Stopped 9/18/24 1804)   insulin regular injection 10 Units 0.1 mL (10 Units Intravenous Given 9/18/24 2125)   furosemide injection 120 mg (120 mg Intravenous Given 9/18/24 2135)   sodium bicarbonate 8.4 % (1 mEq/mL) injection 50 mEq (50 mEq Intravenous Given 9/18/24 2127)   calcium gluconate 1 g in NS IVPB (premixed) (0 g Intravenous Stopped 9/18/24 2243)               Assessment & Plan:    * Inguinal hernia of left side with gangrene  Colonic perforation with abscess  CT abdomen shows Left inguinal hernia containing fat and air as well as marked inflammation extending from inside the pelvis, in the hernia and down into the left scrotum. This is consistent with an infected inguinal hernia, possible gangrene.   Patient was taken to OR and found to have colonic perforation due to mesh erosion. Patient underwent sigmoid resection  and I&D on 9/17  - Cont Vancomycin, cefepime and Clindamycin  - diet per general surgery   - follow blood cultures and OR culture       Dialysis AV fistula malfunction  Was unable to access AVF on 9/18  Trialysis catheter being placed today  Transfer to [facility] for vascular evaluation       Essential hypertension  Chronic, controlled. Latest blood pressure and vitals reviewed-     Home meds for hypertension were reviewed and noted below.   Hypertension Medications               cloNIDine (CATAPRES) 0.3 MG tablet TAKE 1 TABLET BY MOUTH THREE TIMES DAILY    hydrALAZINE (APRESOLINE) 100 MG tablet Take 1 tablet (100 mg total) by mouth 3 (three) times daily.    isosorbide mononitrate (IMDUR) 120 MG 24 hr tablet Take 120 mg by mouth once daily.    metoprolol succinate (TOPROL-XL) 50 MG 24 hr tablet Take 150 mg by mouth once daily.    olmesartan (BENICAR) 40 MG tablet Take 1 tablet by mouth once daily            While in the hospital, will manage blood pressure as follows; Continue home antihypertensive regimen    Will utilize p.r.n. blood pressure medication only if patient's blood pressure greater than 140/90 and he develops symptoms such as worsening chest pain or shortness of breath.    Sleep apnea  CPAP per home settings       Pulmonary hypertension  Resume home meds       Anemia of chronic disease  Anemia is likely due to chronic disease due to ESRD. Most recent hemoglobin and hematocrit are listed below.  Recent Labs     09/17/24  0851 09/18/24  0430 09/19/24  0500   HGB 8.7* 7.7* 6.6*   HCT 27.2* 23.9* 20.7*     Plan  - Monitor serial CBC: Daily  - Planning to transfuse 1 unit PRBC during dialysis today     ESRD (end stage renal disease)  Nephrology consulted for routine dialysis MWF, AVF is not functional and tiralysis catheter being placed today.   Renal dose medications                     EDITABLEPROBLIST     SUBJOBJ    VTE PPX:    VTE Risk Mitigation (From admission, onward)           Ordered     heparin (porcine)  "injection 4,000 Units  As needed (PRN)         09/19/24 1806     heparin (porcine) injection 7,500 Units  Every 8 hours         09/17/24 1031     IP VTE HIGH RISK PATIENT  Once         09/17/24 1031     Place sequential compression device  Until discontinued         09/17/24 1031                  Fluids:    Nutrition:   Code Status:   Code Status: Full Code   Admit Status: IP- Inpatient    Admitting MD:  Kali Harris MD -  Department of Hospital Medicine  - [facility]  ED MD:   Consultants:   Treatment Teams This Admission:  PCP: Dawson Granado MD    PATHWAYS ORDERED      [] COPD       [] DKA      [] CHF      [] CVA Ischemic      [] GI Bleed      [] NSTEMI      [] NONE                 Electronically Signed By: Kali Harris 9/19/2024 9:33 PM  September 19, 2024     Portions of this chart may have been created with Dragon Voice Recognition Software . Occasional wrong-word or "sound-alike" substitutions may have occurred due to the inherent limitations of voice recognition software. Please read the chart carefully and recognize, using context, where these substitutions have occurred.      HD tx tolerated fair    Frequent alarms during tx d/t increased arterial pressures, notified MD via answering service  UF goal decreased d/t hypotension  2 units PRBC administered     Net UF 2.6 L     09/19/24 1915   Handoff Report   Received From Chelita Elizabeth   Given To Miranda/Joana   Vital Signs   Temp 97 °F (36.1 °C)   Temp Source Oral   Pulse 98   Resp 18   SpO2 96 %   Pulse Oximetry Type Intermittent   Flow (L/min) (Oxygen Therapy) 2   Device (Oxygen Therapy) nasal cannula   BP (!) 109/59   BP Location Right forearm   BP Method Automatic   Patient Position Lying   Assessments (Pre/Post)   Blood Liters Processed (BLP) 47.6   Transport Modality not applicable   Level of Consciousness (AVPU) responds to voice   Dialyzer Clearance moderately streaked        Hemodialysis Catheter 09/19/24 left internal " jugular   Placement Date: 09/19/24   Present Prior to Hospital Arrival?: No  Location: left internal jugular   Line Necessity Review CRRT/HD   Verification by X-ray Yes   Site Assessment No drainage;No redness;No swelling;No warmth   Line Securement Device Secured with sutures   Dressing Type CHG impregnated dressing/sponge   Dressing Status Clean;Dry;Intact   Dressing Intervention Integrity maintained   Date on Dressing 09/19/24   Dressing Due to be Changed 09/25/24   Venous Patency/Care flushed w/o difficulty;heparin locked   Arterial Patency/Care flushed w/o difficulty;heparin locked   Waveform Not being transduced        Hemodialysis AV Fistula Left forearm   No placement date or time found.   Present Prior to Hospital Arrival?: Yes  Size/Length: 17 G  Location: Left forearm   Site Assessment Clean;Dry;Intact   Patency Present;Thrill;Bruit   Status Not Accessed   Post-Hemodialysis Assessment   Rinseback Volume (mL) 250 mL   Blood Volume Processed (Liters) 47.6 L   Dialyzer Clearance Moderately streaked   Duration of Treatment 180 minutes   Additional Fluid Intake (mL) 500 mL   Total UF (mL) 3700 mL   Net Fluid Removal 2570   Patient Response to Treatment Tolerated fair   Post-Hemodialysis Comments Tx complete, pt stable

## 2024-09-20 NOTE — PROGRESS NOTES
HD tx tolerated fair    Frequent alarms during tx d/t increased arterial pressures, notified MD via answering service  UF goal decreased d/t hypotension  2 units PRBC administered     Net UF 2.6 L     09/19/24 1915   Handoff Report   Received From [facility]   Given To Miranda/Joana   Vital Signs   Temp 97 °F (36.1 °C)   Temp Source Oral   Pulse 98   Resp 18   SpO2 96 %   Pulse Oximetry Type Intermittent   Flow (L/min) (Oxygen Therapy) 2   Device (Oxygen Therapy) nasal cannula   BP (!) 109/59   BP Location Right forearm   BP Method Automatic   Patient Position Lying   Assessments (Pre/Post)   Blood Liters Processed (BLP) 47.6   Transport Modality not applicable   Level of Consciousness (AVPU) responds to voice   Dialyzer Clearance moderately streaked        Hemodialysis Catheter 09/19/24 left internal jugular   Placement Date: 09/19/24   Present Prior to Hospital Arrival?: No  Location: left internal jugular   Line Necessity Review CRRT/HD   Verification by X-ray Yes   Site Assessment No drainage;No redness;No swelling;No warmth   Line Securement Device Secured with sutures   Dressing Type CHG impregnated dressing/sponge   Dressing Status Clean;Dry;Intact   Dressing Intervention Integrity maintained   Date on Dressing 09/19/24   Dressing Due to be Changed 09/25/24   Venous Patency/Care flushed w/o difficulty;heparin locked   Arterial Patency/Care flushed w/o difficulty;heparin locked   Waveform Not being transduced        Hemodialysis AV Fistula Left forearm   No placement date or time found.   Present Prior to Hospital Arrival?: Yes  Size/Length: 17 G  Location: Left forearm   Site Assessment Clean;Dry;Intact   Patency Present;Thrill;Bruit   Status Not Accessed   Post-Hemodialysis Assessment   Rinseback Volume (mL) 250 mL   Blood Volume Processed (Liters) 47.6 L   Dialyzer Clearance Moderately streaked   Duration of Treatment 180 minutes   Additional Fluid Intake (mL) 500 mL   Total UF (mL) 3700 mL   Net Fluid  Removal 6202   Patient Response to Treatment Tolerated fair   Post-Hemodialysis Comments Tx complete, pt stable

## 2024-09-20 NOTE — ASSESSMENT & PLAN NOTE
Nephrology consulted for routine dialysis MWF, AVF is not functional and tiralysis catheter was placed yesterday.  Awaiting transfer to another medical facility for AV fistula declotting.  Renal dose medications

## 2024-09-21 LAB
ALBUMIN SERPL BCP-MCNC: 3.1 G/DL (ref 3.5–5.2)
ALP SERPL-CCNC: 77 U/L (ref 55–135)
ALT SERPL W/O P-5'-P-CCNC: 11 U/L (ref 10–44)
ANION GAP SERPL CALC-SCNC: 13 MMOL/L (ref 8–16)
ANISOCYTOSIS BLD QL SMEAR: SLIGHT
AST SERPL-CCNC: 22 U/L (ref 10–40)
BASOPHILS NFR BLD: 0 % (ref 0–1.9)
BILIRUB SERPL-MCNC: 0.5 MG/DL (ref 0.1–1)
BUN SERPL-MCNC: 38 MG/DL (ref 6–20)
CALCIUM SERPL-MCNC: 8.5 MG/DL (ref 8.7–10.5)
CHLORIDE SERPL-SCNC: 97 MMOL/L (ref 95–110)
CO2 SERPL-SCNC: 24 MMOL/L (ref 23–29)
CREAT SERPL-MCNC: 11.6 MG/DL (ref 0.5–1.4)
DIFFERENTIAL METHOD BLD: ABNORMAL
EOSINOPHIL NFR BLD: 3 % (ref 0–8)
ERYTHROCYTE [DISTWIDTH] IN BLOOD BY AUTOMATED COUNT: 20 % (ref 11.5–14.5)
EST. GFR  (NO RACE VARIABLE): 5.1 ML/MIN/1.73 M^2
GLUCOSE SERPL-MCNC: 106 MG/DL (ref 70–110)
HBV SURFACE AB SER QL IA: NEGATIVE
HBV SURFACE AB SERPL IA-ACNC: <3.5 MIU/ML
HBV SURFACE AG SERPL QL IA: NEGATIVE
HCT VFR BLD AUTO: 24.4 % (ref 40–54)
HGB BLD-MCNC: 7.6 G/DL (ref 14–18)
IMM GRANULOCYTES # BLD AUTO: ABNORMAL K/UL (ref 0–0.04)
IMM GRANULOCYTES NFR BLD AUTO: ABNORMAL % (ref 0–0.5)
LYMPHOCYTES NFR BLD: 15 % (ref 18–48)
MAGNESIUM SERPL-MCNC: 1.9 MG/DL (ref 1.6–2.6)
MCH RBC QN AUTO: 30.4 PG (ref 27–31)
MCHC RBC AUTO-ENTMCNC: 31.1 G/DL (ref 32–36)
MCV RBC AUTO: 98 FL (ref 82–98)
METAMYELOCYTES NFR BLD MANUAL: 1 %
MONOCYTES NFR BLD: 14 % (ref 4–15)
MYELOCYTES NFR BLD MANUAL: 1 %
NEUTROPHILS NFR BLD: 55 % (ref 38–73)
NEUTS BAND NFR BLD MANUAL: 11 %
NRBC BLD-RTO: 0 /100 WBC
PLATELET # BLD AUTO: 249 K/UL (ref 150–450)
PLATELET BLD QL SMEAR: ABNORMAL
PMV BLD AUTO: 10.7 FL (ref 9.2–12.9)
POCT GLUCOSE: 133 MG/DL (ref 70–110)
POCT GLUCOSE: 143 MG/DL (ref 70–110)
POCT GLUCOSE: 144 MG/DL (ref 70–110)
POCT GLUCOSE: 161 MG/DL (ref 70–110)
POCT GLUCOSE: 181 MG/DL (ref 70–110)
POTASSIUM SERPL-SCNC: 4.5 MMOL/L (ref 3.5–5.1)
PROT SERPL-MCNC: 7 G/DL (ref 6–8.4)
RBC # BLD AUTO: 2.5 M/UL (ref 4.6–6.2)
SODIUM SERPL-SCNC: 134 MMOL/L (ref 136–145)
WBC # BLD AUTO: 13.43 K/UL (ref 3.9–12.7)

## 2024-09-21 PROCEDURE — 80053 COMPREHEN METABOLIC PANEL: CPT | Performed by: INTERNAL MEDICINE

## 2024-09-21 PROCEDURE — 99900031 HC PATIENT EDUCATION (STAT)

## 2024-09-21 PROCEDURE — 94660 CPAP INITIATION&MGMT: CPT

## 2024-09-21 PROCEDURE — 63600175 PHARM REV CODE 636 W HCPCS: Performed by: INTERNAL MEDICINE

## 2024-09-21 PROCEDURE — 94799 UNLISTED PULMONARY SVC/PX: CPT

## 2024-09-21 PROCEDURE — 12000002 HC ACUTE/MED SURGE SEMI-PRIVATE ROOM

## 2024-09-21 PROCEDURE — 25000003 PHARM REV CODE 250: Performed by: SURGERY

## 2024-09-21 PROCEDURE — 25000003 PHARM REV CODE 250: Performed by: INTERNAL MEDICINE

## 2024-09-21 PROCEDURE — 99900035 HC TECH TIME PER 15 MIN (STAT)

## 2024-09-21 PROCEDURE — 94761 N-INVAS EAR/PLS OXIMETRY MLT: CPT

## 2024-09-21 PROCEDURE — 83735 ASSAY OF MAGNESIUM: CPT | Performed by: INTERNAL MEDICINE

## 2024-09-21 PROCEDURE — 85027 COMPLETE CBC AUTOMATED: CPT | Performed by: INTERNAL MEDICINE

## 2024-09-21 PROCEDURE — 36415 COLL VENOUS BLD VENIPUNCTURE: CPT | Performed by: INTERNAL MEDICINE

## 2024-09-21 PROCEDURE — 85007 BL SMEAR W/DIFF WBC COUNT: CPT | Performed by: INTERNAL MEDICINE

## 2024-09-21 PROCEDURE — 27000221 HC OXYGEN, UP TO 24 HOURS

## 2024-09-21 RX ADMIN — SENNOSIDES AND DOCUSATE SODIUM 1 TABLET: 8.6; 5 TABLET ORAL at 08:09

## 2024-09-21 RX ADMIN — HYDRALAZINE HYDROCHLORIDE 100 MG: 25 TABLET ORAL at 08:09

## 2024-09-21 RX ADMIN — ISOSORBIDE MONONITRATE 120 MG: 60 TABLET, EXTENDED RELEASE ORAL at 08:09

## 2024-09-21 RX ADMIN — CLINDAMYCIN IN 5 PERCENT DEXTROSE 900 MG: 18 INJECTION, SOLUTION INTRAVENOUS at 08:09

## 2024-09-21 RX ADMIN — OLMESARTAN MEDOXOMIL 40 MG: 20 TABLET, FILM COATED ORAL at 08:09

## 2024-09-21 RX ADMIN — CLINDAMYCIN IN 5 PERCENT DEXTROSE 900 MG: 18 INJECTION, SOLUTION INTRAVENOUS at 07:09

## 2024-09-21 RX ADMIN — SODIUM ZIRCONIUM CYCLOSILICATE 10 G: 10 POWDER, FOR SUSPENSION ORAL at 08:09

## 2024-09-21 RX ADMIN — CLONIDINE HYDROCHLORIDE 0.3 MG: 0.1 TABLET ORAL at 02:09

## 2024-09-21 RX ADMIN — SODIUM ZIRCONIUM CYCLOSILICATE 10 G: 10 POWDER, FOR SUSPENSION ORAL at 02:09

## 2024-09-21 RX ADMIN — CLINDAMYCIN IN 5 PERCENT DEXTROSE 900 MG: 18 INJECTION, SOLUTION INTRAVENOUS at 12:09

## 2024-09-21 RX ADMIN — SEVELAMER CARBONATE 1600 MG: 800 TABLET, FILM COATED ORAL at 07:09

## 2024-09-21 RX ADMIN — GABAPENTIN 200 MG: 100 CAPSULE ORAL at 08:09

## 2024-09-21 RX ADMIN — MUPIROCIN 1 G: 20 OINTMENT TOPICAL at 08:09

## 2024-09-21 RX ADMIN — METOPROLOL SUCCINATE 150 MG: 50 TABLET, FILM COATED, EXTENDED RELEASE ORAL at 08:09

## 2024-09-21 RX ADMIN — HEPARIN SODIUM 7500 UNITS: 5000 INJECTION INTRAVENOUS; SUBCUTANEOUS at 05:09

## 2024-09-21 RX ADMIN — SEVELAMER CARBONATE 1600 MG: 800 TABLET, FILM COATED ORAL at 12:09

## 2024-09-21 RX ADMIN — ACETAMINOPHEN 650 MG: 325 TABLET ORAL at 08:09

## 2024-09-21 RX ADMIN — CEFEPIME 2 G: 2 INJECTION, POWDER, FOR SOLUTION INTRAVENOUS at 02:09

## 2024-09-21 RX ADMIN — HEPARIN SODIUM 7500 UNITS: 5000 INJECTION INTRAVENOUS; SUBCUTANEOUS at 09:09

## 2024-09-21 RX ADMIN — SEVELAMER CARBONATE 1600 MG: 800 TABLET, FILM COATED ORAL at 05:09

## 2024-09-21 RX ADMIN — HYDRALAZINE HYDROCHLORIDE 100 MG: 25 TABLET ORAL at 02:09

## 2024-09-21 RX ADMIN — SODIUM ZIRCONIUM CYCLOSILICATE 10 G: 10 POWDER, FOR SUSPENSION ORAL at 09:09

## 2024-09-21 RX ADMIN — HYDROCODONE BITARTRATE AND ACETAMINOPHEN 1 TABLET: 5; 325 TABLET ORAL at 02:09

## 2024-09-21 RX ADMIN — CLINDAMYCIN IN 5 PERCENT DEXTROSE 900 MG: 18 INJECTION, SOLUTION INTRAVENOUS at 01:09

## 2024-09-21 RX ADMIN — CLONIDINE HYDROCHLORIDE 0.3 MG: 0.1 TABLET ORAL at 08:09

## 2024-09-21 RX ADMIN — BISACODYL 5 MG: 5 TABLET, COATED ORAL at 09:09

## 2024-09-21 RX ADMIN — HEPARIN SODIUM 7500 UNITS: 5000 INJECTION INTRAVENOUS; SUBCUTANEOUS at 02:09

## 2024-09-21 RX ADMIN — MUPIROCIN 1 G: 20 OINTMENT TOPICAL at 09:09

## 2024-09-21 NOTE — ASSESSMENT & PLAN NOTE
Was unable to access AVF on 9/18  Trialysis catheter placed on 9/19  Vascular surgery evaluation pending for declotting of AV fistula.

## 2024-09-21 NOTE — PROGRESS NOTES
2500 ml net uf removed, tx extended due to need to cath seth cvc   09/20/24 1925   Required for all Hemodialysis Patients   Hepatitis Status negative   Handoff Report   Received From Nabila Guth[facility]   Treatment Type   Treatment Type Maintenance   Vital Signs   Temp (!) 101.8 °F (38.8 °C)   Temp Source Oral   Pulse 106   Resp 18   BP (!) 156/88   BP Location Right arm   BP Method Automatic   Patient Position Lying   Assessments (Pre/Post)   Consent Obtained yes   Safety vein preservation armband present   Date Hepatitis Profile Obtained 09/19/24   Blood Liters Processed (BLP) 43.6   Transport Modality bed   Level of Consciousness (AVPU) alert   Dialyzer Clearance mildly streaked   Pain   Preferred Pain Scale number (Numeric Rating Pain Scale)   Pain Body Location abdomen   Pain Rating (0-10): Activity 10        Hemodialysis Catheter 09/19/24 left internal jugular   Placement Date: 09/19/24   Present Prior to Hospital Arrival?: No  Location: left internal jugular   Line Necessity Review CRRT/HD   Site Assessment No drainage;No redness;No swelling;No warmth   Line Securement Device Secured with sutures   Dressing Type CHG impregnated dressing/sponge   Dressing Status Clean;Dry;Intact   Dressing Intervention Integrity maintained   Date on Dressing 09/19/24   Dressing Due to be Changed 09/24/24   Venous Patency/Care flushed w/o difficulty;heparin locked;deaccessed   Arterial Patency/Care flushed w/o difficulty;heparin locked;deaccessed        Hemodialysis AV Fistula Left forearm   No placement date or time found.   Present Prior to Hospital Arrival?: Yes  Size/Length: 17 G  Location: Left forearm   Status Not Accessed   Post-Hemodialysis Assessment   Rinseback Volume (mL) 250 mL   Blood Volume Processed (Liters) 43.6 L   Dialyzer Clearance Lightly streaked   Duration of Treatment 300 minutes   Additional Fluid Intake (mL) 500 mL   Total UF (mL) 3000 mL   Net Fluid Removal 2500   Patient Response to Treatment  cath. needed cath seth   Post-Treatment Weight 131.5 kg (289 lb 14.5 oz)   Treatment Weight Change -2.5   Post-Hemodialysis Comments tx completed     Educated on cath care

## 2024-09-21 NOTE — SUBJECTIVE & OBJECTIVE
Interval History:  Patient is seen and examined.  Transferred from [facility] for left arm AV fistula declotting.  Patient does not want to be seen by Dr. Khoobehi from vascular surgery.  Call transfer center. Currently afebrile.      Review of Systems No active complaints  Objective:     Vital Signs (Most Recent):  Temp: 98.5 °F (36.9 °C) (09/21/24 1100)  Pulse: 92 (09/21/24 1100)  Resp: 18 (09/21/24 1100)  BP: (!) 120/58 (notified nurse) (09/21/24 1100)  SpO2: (!) 93 % (09/21/24 1100) Vital Signs (24h Range):  Temp:  [98.1 °F (36.7 °C)-101.8 °F (38.8 °C)] 98.5 °F (36.9 °C)  Pulse:  [] 92  Resp:  [18-20] 18  SpO2:  [93 %-99 %] 93 %  BP: (103-161)/(54-94) 120/58     Weight: 134 kg (295 lb 6.7 oz)  Body mass index is 37.93 kg/m².    Intake/Output Summary (Last 24 hours) at 9/21/2024 1414  Last data filed at 9/21/2024 1259  Gross per 24 hour   Intake 1380 ml   Output 3000 ml   Net -1620 ml      Physical Exam  Vitals and nursing note reviewed.   Constitutional:       General: He is not in acute distress.     Appearance: He is obese. He is not toxic-appearing.   HENT:      Head: Atraumatic.      Mouth/Throat:      Mouth: Mucous membranes are moist.      Pharynx: Oropharynx is clear.   Eyes:      General: No scleral icterus.     Conjunctiva/sclera: Conjunctivae normal.      Pupils: Pupils are equal, round, and reactive to light.   Cardiovascular:      Rate and Rhythm: Regular rhythm.      Heart sounds: No murmur heard.  Pulmonary:      Effort: No respiratory distress.      Breath sounds: No wheezing, rhonchi or rales.   Abdominal:      General: Abdomen is flat. Bowel sounds are normal.      Palpations: Abdomen is soft.   Genitourinary:     Comments: There is left groin dressing and also laparoscopic scars in the abdomen   Musculoskeletal:         General: No swelling or deformity.      Cervical back: No rigidity or tenderness.   Left upper extremity with fistula in a bandage  Skin:     Coloration:  Skin is not jaundiced or pale.      Findings: No bruising, erythema or rash.   Neurological:      General: No focal deficit present.      Mental Status: He is alert and oriented to person, place, and time.      Cranial Nerves: No cranial nerve deficit.      Sensory: No sensory deficit.      Motor: No weakness.   Psychiatric:         Mood and Affect: Mood normal.         Behavior: Behavior normal    Significant Labs: All pertinent labs within the past 24 hours have been reviewed.  CBC:   Recent Labs   Lab 09/20/24  0153 09/21/24  0558   WBC 13.02* 13.43*   HGB 7.3* 7.6*   HCT 22.5* 24.4*    249     CMP:   Recent Labs   Lab 09/20/24  0152 09/20/24  0153 09/21/24  0558   * 135* 134*   K 4.9 5.0 4.5   CL 94* 95 97   CO2 26 26 24    104 106   BUN 42* 42* 38*   CREATININE 13.2* 13.4* 11.6*   CALCIUM 8.2* 8.3* 8.5*   PROT 6.9 6.9 7.0   ALBUMIN 3.2* 3.1* 3.1*   BILITOT 0.6 0.6 0.5   ALKPHOS 71 71 77   AST 26 26 22   ALT 14 14 11   ANIONGAP 13 14 13       Significant Imaging:     CT abdomen and pelvis without contrast:  Left inguinal hernia containing fat and air as well as marked inflammation extending from inside the pelvis, in the hernia and down into the left scrotum.  This is consistent with an infected inguinal hernia, possible gangrene.  Diverticulosis coli without CT evidence of diverticulitis.  2.4 cm cyst of the left kidney.  Small kidneys noted    CXR: No acute abnormality.     CXR:  Central line inserted ending in the superior vena cava. Hypoventilation.   Physical Exam

## 2024-09-21 NOTE — PROGRESS NOTES
[facility]  General Surgery  Progress Note    Subjective:     Interval History:  No acute events overnight.  Afebrile.  Passing flatus.  Tolerating diet.    Post-Op Info:  Procedure(s) (LRB):  XI ROBOTIC SIGMOID RESECTION, WITH ANASTAMOSIS (N/A)  DRAINAGE, ABSCESS, GROIN (Left)   4 Days Post-Op      Medications:  Continuous Infusions:  Scheduled Meds:   bisacodyL  5 mg Oral QHS    ceFEPime IV (PEDS and ADULTS)  2 g Intravenous Q24H    clindamycin in D5W  900 mg Intravenous Q6H    cloNIDine  0.3 mg Oral TID    gabapentin  200 mg Oral BID    heparin (porcine)  7,500 Units Subcutaneous Q8H    hydrALAZINE  100 mg Oral TID    isosorbide mononitrate  120 mg Oral Daily    metoprolol succinate  150 mg Oral Daily    mupirocin   Nasal BID    olmesartan  40 mg Oral Daily    senna-docusate 8.6-50 mg  1 tablet Oral Daily    sevelamer carbonate  1,600 mg Oral TID WM    sodium zirconium cyclosilicate  10 g Oral TID     PRN Meds:  Current Facility-Administered Medications:     0.9%  NaCl infusion (for blood administration), , Intravenous, Q24H PRN    0.9%  NaCl infusion (for blood administration), , Intravenous, Q24H PRN    0.9%  NaCl infusion (for blood administration), , Intravenous, Q24H PRN    0.9% NaCl, , Intravenous, PRN    acetaminophen, 650 mg, Oral, Q8H PRN    acetaminophen, 650 mg, Oral, Q4H PRN    aluminum-magnesium hydroxide-simethicone, 30 mL, Oral, QID PRN    dextrose 10%, 12.5 g, Intravenous, PRN    dextrose 10%, 12.5 g, Intravenous, PRN    dextrose 10%, 12.5 g, Intravenous, PRN    dextrose 10%, 25 g, Intravenous, PRN    dextrose 10%, 25 g, Intravenous, PRN    dextrose 10%, 25 g, Intravenous, PRN    glucagon (human recombinant), 1 mg, Intramuscular, PRN    glucose, 16 g, Oral, PRN    glucose, 24 g, Oral, PRN    heparin (porcine), 4,000 Units, Intravenous, PRN    HYDROcodone-acetaminophen, 1 tablet, Oral, Q4H PRN    HYDROmorphone, 1 mg, Intravenous, Q4H PRN    insulin aspart U-100, 0-5 Units,  Subcutaneous, QID (AC + HS) PRN    magnesium oxide, 800 mg, Oral, PRN    magnesium oxide, 800 mg, Oral, PRN    melatonin, 6 mg, Oral, Nightly PRN    naloxone, 0.02 mg, Intravenous, PRN    ondansetron, 4 mg, Intravenous, Q6H PRN    potassium bicarbonate, 35 mEq, Oral, PRN    potassium bicarbonate, 50 mEq, Oral, PRN    potassium bicarbonate, 60 mEq, Oral, PRN    potassium, sodium phosphates, 2 packet, Oral, PRN    potassium, sodium phosphates, 2 packet, Oral, PRN    potassium, sodium phosphates, 2 packet, Oral, PRN    sodium chloride 0.9%, 250 mL, Intravenous, PRN    sodium chloride 0.9%, 3 mL, Intravenous, Q12H PRN    Pharmacy to dose Vancomycin consult, , , Once **AND** vancomycin - pharmacy to dose, , Intravenous, pharmacy to manage frequency     Objective:     Vital Signs (Most Recent):  Temp: 98.5 °F (36.9 °C) (09/21/24 1100)  Pulse: 92 (09/21/24 1100)  Resp: 18 (09/21/24 1100)  BP: (!) 120/58 (notified nurse) (09/21/24 1100)  SpO2: (!) 93 % (09/21/24 1100) Vital Signs (24h Range):  Temp:  [98.1 °F (36.7 °C)-101.8 °F (38.8 °C)] 98.5 °F (36.9 °C)  Pulse:  [] 92  Resp:  [18-20] 18  SpO2:  [93 %-99 %] 93 %  BP: (103-161)/(54-92) 120/58       Intake/Output Summary (Last 24 hours) at 9/21/2024 1548  Last data filed at 9/21/2024 1259  Gross per 24 hour   Intake 1380 ml   Output 3000 ml   Net -1620 ml       Physical Exam  Abdominal:      Comments: Abdomen is soft.  Appropriately tender.  Incisions intact.  Groin Penrose drains in place.  Wound changed today.  BERNABE drain serous sanguinous         Significant Labs:  CBC:   Recent Labs   Lab 09/21/24  0558   WBC 13.43*   RBC 2.50*   HGB 7.6*   HCT 24.4*      MCV 98   MCH 30.4   MCHC 31.1*     CMP:   Recent Labs   Lab 09/21/24  0558      CALCIUM 8.5*   ALBUMIN 3.1*   PROT 7.0   *   K 4.5   CO2 24   CL 97   BUN 38*   CREATININE 11.6*   ALKPHOS 77   ALT 11   AST 22   BILITOT 0.5       Significant Diagnostics:  I have reviewed all pertinent imaging  results/findings within the past 24 hours.    Assessment/Plan:     Active Diagnoses:    Diagnosis Date Noted POA    PRINCIPAL PROBLEM:  Inguinal hernia of left side with gangrene [K40.40] 09/17/2024 Yes    Dialysis AV fistula malfunction [T82.590A] 09/19/2024 No    Essential hypertension [I10] 07/17/2024 Yes    Sleep apnea [G47.30] 03/31/2023 Yes    Pulmonary hypertension [I27.20] 11/29/2022 Yes    ESRD (end stage renal disease) [N18.6] 10/10/2022 Yes    Anemia of chronic disease [D63.8] 10/10/2022 Yes      Problems Resolved During this Admission:   -postop day 4 partial colectomy and drainage of groin abscess.  -continue current wound care.  Continue current diet  -vascular surgery consulted for his AV fistula.  Left IJ Trialysis working.  -continue antibiotics.  -PT  -DVT prophylaxis  -IS    Jourdan Merino III, MD  General Surgery  [facility]

## 2024-09-21 NOTE — CONSULTS
Chief complaint:  Fatigue and Groin Swelling (X 2 days.  Hx of Kidney failure and CHF)      HPI:  João Ham is a 44 y.o. male presenting with Lap incisions to the abdomen with dressings intact, BERNABE drain, left groiin drains intact, clean  Multiple open surgical wounds. Pt was seen with the wound nurse at bedside and pt is post surgery. Pt would like to FU at the OP clinic on DC.    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     Social Determinants of Health     Financial Resource Strain: Low Risk  (9/18/2024)    Overall Financial Resource Strain (CARDIA)     Difficulty of Paying Living Expenses: Not very hard   Food Insecurity: No Food Insecurity (9/18/2024)    Hunger Vital Sign     Worried About Running Out of Food in the Last Year: Never true     Ran Out of Food in the Last Year: Never true   Transportation Needs: No Transportation Needs (9/18/2024)    TRANSPORTATION NEEDS     Transportation : No   Physical Activity: Sufficiently Active (1/3/2023)    Exercise Vital Sign     Days of Exercise per Week: 6 days     Minutes of Exercise per Session: 60 min   Recent Concern: Physical Activity - Insufficiently Active (10/11/2022)    Exercise Vital Sign     Days of Exercise per Week: 3 days     Minutes of Exercise per Session: 30 min   Stress: Stress Concern Present (9/18/2024)    [employer] - Occupational Stress Questionnaire      Feeling of Stress : Very much   Housing Stability: Low Risk  (9/18/2024)    Housing Stability Vital Sign     Unable to Pay for Housing in the Last Year: No     Homeless in the Last Year: No       Past Surgical History:   Procedure Laterality Date    ABSCESS DRAINAGE Left 9/17/2024    Procedure: DRAINAGE, ABSCESS, GROIN;  Surgeon: Galileo Connors MD;  Location: [facility];  Service: General;  Laterality: Left;    FISTULOGRAM Bilateral 4/30/2024    Procedure: Fistulogram;  Surgeon: Khoobehi, Ali, MD;  Location: [facility] CATH/EP LAB;  Service: Vascular;  Laterality: Bilateral;    HERNIA REPAIR Right     With mesh    INSERTION, CATHETER, TUNNELED N/A 6/22/2023    Procedure: Insertion,catheter,tunneled;  Surgeon: Everett Caicedo MD;  Location: [facility] OR;  Service: General;  Laterality: N/A;    PERCUTANEOUS TRANSLUMINAL ANGIOPLASTY OF ARTERIOVENOUS FISTULA Left 4/30/2024    Procedure: PTA, AV FISTULA;  Surgeon: Khoobehi, Ali, MD;  Location: [facility] CATH/EP LAB;  Service: Vascular;  Laterality: Left;    PHLEBOGRAPHY N/A 7/19/2024    Procedure: Venogram;  Surgeon: Khoobehi, Ali, MD;  Location: [facility] CATH/EP LAB;  Service: Vascular;  Laterality: N/A;    REMOVAL, TUNNELED CATH Right 7/19/2024    Procedure: REMOVAL, TUNNELED CATH;  Surgeon: Khoobehi, Ali, MD;  Location: [facility] CATH/EP LAB;  Service: Vascular;  Laterality: Right;    ROBOT-ASSISTED LAPAROSCOPIC RESECTION OF SIGMOID COLON USING DA DELANO XI N/A 9/17/2024    Procedure: XI ROBOTIC SIGMOID RESECTION, WITH ANASTAMOSIS;  Surgeon: Galileo Connors MD;  Location: [facility];  Service: General;  Laterality: N/A;  possible open have instruments available       No family history on file.    Review of patient's allergies indicates:   Allergen Reactions    Mushroom Swelling     Tongue swells    Tongue swells       Tongue swells       No current facility-administered medications on file prior to encounter.     Current Outpatient Medications on File Prior to Encounter   Medication Sig  "Dispense Refill    apixaban (ELIQUIS) 2.5 mg Tab Take 2.5 mg by mouth 2 (two) times daily.      calcitRIOL (ROCALTROL) 0.25 MCG Cap Take 1 capsule by mouth once daily (Patient taking differently: Take 0.25 mcg by mouth once daily.) 90 capsule 1    cholecalciferol, vitamin D3, 125 mcg (5,000 unit) Tab Take 1 tablet by mouth once daily.      cloNIDine (CATAPRES) 0.3 MG tablet TAKE 1 TABLET BY MOUTH THREE TIMES DAILY 270 tablet 2    isosorbide mononitrate (IMDUR) 120 MG 24 hr tablet Take 120 mg by mouth once daily.      metoprolol succinate (TOPROL-XL) 50 MG 24 hr tablet Take 150 mg by mouth once daily.      olmesartan (BENICAR) 40 MG tablet Take 1 tablet by mouth once daily 90 tablet 0    sevelamer carbonate (RENVELA) 800 mg Tab Take 2 tablets (1,600 mg total) by mouth 3 (three) times daily with meals. 180 tablet 11    calcium carbonate (TUMS) 200 mg calcium (500 mg) chewable tablet Take 2 tablets (1,000 mg total) by mouth 3 (three) times daily with meals. (Patient taking differently: Take 1,000 mg by mouth 3 (three) times daily.) 180 tablet 11    hydrALAZINE (APRESOLINE) 100 MG tablet Take 1 tablet (100 mg total) by mouth 3 (three) times daily. 90 tablet 11       ROS: As per HPI and below:  Pertinent items are noted in HPI.      Physical Exam:     Vitals:    24 0227 24 0517 24 0701 24 0725   BP:  (!) 103/54 116/62    BP Location:       Patient Position:       Pulse:  88 97 90   Resp: 20 20 18 19   Temp:  99.8 °F (37.7 °C) 99.2 °F (37.3 °C)    TempSrc:   Oral    SpO2:  95% 97% 95%   Weight:       Height:           BP  Min: 83/48  Max: 220/123  Temp  Av.5 °F (36.9 °C)  Min: 97 °F (36.1 °C)  Max: 102.3 °F (39.1 °C)  Pulse  Av.9  Min: 52  Max: 133  Resp  Av.7  Min: 9  Max: 26  SpO2  Av.3 %  Min: 89 %  Max: 100 %  Height  Av' 1.5" (186.7 cm)  Min: 5' 11" (180.3 cm)  Max: 6' 2" (188 cm)  Weight  Av.2 kg (286 lb 15.5 oz)  Min: 126.1 kg (278 lb)  Max: 137 kg (302 lb 0.5 " oz)    Body mass index is 37.93 kg/m².          General:             Well developed, well nourished, no apparent distress  HEENT:              NCAT, no JVD, mucous membranes moist, EOM intact  Cardiovascular:  Regular rate and rhythm, normal S1, normal S2, No murmurs, rubs, or gallops  Respiratory:        Normal breath sounds, no wheezes, no rales, no rhonchi  Abdomen:           Bowel sounds present, non tender, non distended, no masses, no hepatojugular reflux  Extremities:        No clubbing, no cyanosis, no edema  Vascular:            2+ b/l radial.  Peripheral pulses intact.  No carotid bruits.  Neurological:      No focal deficits  Skin:                   No obvious rashes or erythema, Lap incisions to the abdomen with dressings intact, BERNABE drain, left groiin drains intact, clean  Multiple open surgical wounds                        Lab Results   Component Value Date    WBC 13.43 (H) 09/21/2024    HGB 7.6 (L) 09/21/2024    HCT 24.4 (L) 09/21/2024    MCV 98 09/21/2024     09/21/2024     Lab Results   Component Value Date    CHOL 141 10/11/2022     Lab Results   Component Value Date    HDL 39 (L) 10/11/2022     Lab Results   Component Value Date    LDLCALC 88.8 10/11/2022     Lab Results   Component Value Date    TRIG 66 10/11/2022     Lab Results   Component Value Date    CHOLHDL 27.7 10/11/2022     CMP  Recent Labs   Lab 09/21/24  0558      CALCIUM 8.5*   ALBUMIN 3.1*   PROT 7.0   *   K 4.5   CO2 24   CL 97   BUN 38*   CREATININE 11.6*   ALKPHOS 77   ALT 11   AST 22   BILITOT 0.5      Lab Results   Component Value Date    TSH 1.699 10/10/2022           Assessment and Recommendations       Diagnoses:    Lap incisions to the abdomen with dressings intact, BERNABE drain, left groiin drains intact, clean  Multiple open surgical wounds    Plan:  ABD and mesh panty  Pt will FU at the OP clinic as needed    Complexity:    moderate

## 2024-09-21 NOTE — CARE UPDATE
09/21/24 0725   Patient Assessment/Suction   Level of Consciousness (AVPU) alert   Respiratory Effort Unlabored;Normal   Expansion/Accessory Muscles/Retractions no use of accessory muscles;no retractions;expansion symmetric   All Lung Fields Breath Sounds clear   Rhythm/Pattern, Respiratory unlabored;pattern regular;depth regular;chest wiggle adequate;no shortness of breath reported   Cough Frequency no cough   Skin Integrity   $ Wound Care Tech Time 15 min   Area Observed Bridge of nose   Skin Appearance without discoloration   PRE-TX-O2   Device (Oxygen Therapy) room air   $ Is the patient on Low Flow Oxygen? Yes   SpO2 95 %   Pulse Oximetry Type Intermittent   $ Pulse Oximetry - Multiple Charge Pulse Oximetry - Multiple   Pulse 90   Resp 19   Incentive Spirometer   $ Incentive Spirometer Charges done with encouragement   Incentive Spirometer Predicted Level (mL) 2500   Administration (IS) self-administered   Number of Repetitions (IS) 10   Level Incentive Spirometer (mL) 2500   Patient Tolerance (IS) no adverse signs/symptoms present;good   Preset CPAP/BiPAP Settings   Mode Of Delivery BiPAP;Standby   Equipment Type V60   Education   $ Education Incentive Spirometry;15 min

## 2024-09-21 NOTE — ASSESSMENT & PLAN NOTE
Colonic perforation with abscess  CT abdomen shows Left inguinal hernia containing fat and air as well as marked inflammation extending from inside the pelvis, in the hernia and down into the left scrotum. This is consistent with an infected inguinal hernia, possible gangrene.   Patient was taken to OR and found to have colonic perforation due to mesh erosion. Patient underwent sigmoid resection and I&D on 9/17    - diet per general surgery   - had fever yesterday  - blood cultures and OR culture no growth till date  - Cont Vancomycin, cefepime and Clindamycin

## 2024-09-21 NOTE — CARE UPDATE
09/20/24 2004   Patient Assessment/Suction   Level of Consciousness (AVPU) alert   Respiratory Effort Normal   Expansion/Accessory Muscles/Retractions no use of accessory muscles   All Lung Fields Breath Sounds clear   Rhythm/Pattern, Respiratory unlabored   Skin Integrity   $ Wound Care Tech Time 15 min   Area Observed Bridge of nose   Skin Appearance without discoloration   PRE-TX-O2   Device (Oxygen Therapy) room air   SpO2 97 %   Pulse Oximetry Type Intermittent   $ Pulse Oximetry - Multiple Charge Pulse Oximetry - Multiple   Pulse (!) 113   Resp 19   Incentive Spirometer   $ Incentive Spirometer Charges done with encouragement   Incentive Spirometer Predicted Level (mL) 1500   Administration (IS) instruction provided, follow-up   Number of Repetitions (IS) 10   Level Incentive Spirometer (mL) 2000   Patient Tolerance (IS) good   Preset CPAP/BiPAP Settings   Mode Of Delivery BiPAP;Standby   Education   $ Education 15 min;Incentive Spirometry;BiPAP   Respiratory Evaluation   $ Care Plan Tech Time 15 min

## 2024-09-21 NOTE — ASSESSMENT & PLAN NOTE
Nephrology consulted for routine dialysis MWF, AVF is not functional and tiralysis catheter was placed yesterday.    Plan for AV fistula declotting.  Renal dose medications   Underwent HD

## 2024-09-21 NOTE — PROGRESS NOTES
INPATIENT NEPHROLOGY Progress  [facility] NEPHROLOGY    João Ham  09/21/2024    Reason for consultation:    esrd    Chief Complaint:   Chief Complaint   Patient presents with    Fatigue    Groin Swelling     X 2 days.  Hx of Kidney failure and CHF          History of Present Illness:     Per H and P    Patient is a 44 year old male with history of ESDR on dialysis MWF, awaiting kidney transplant, HTN, presented to ED with 3 days history of left groin pain and swelling. States swelling comes every time he cough and is being painful. Patient has low grade fever 99s at home. He has been vomiting bilious fluid last 2-3 days. No diarrhea or abdominal pain.      In the ED CT abdomen showed Left inguinal hernia containing fat and air as well as marked inflammation extending from inside the pelvis, in the hernia and down into the left scrotum. This is consistent with an infected inguinal hernia, possible gangrene. WBC was 14, patient was tachycardic. General surgery was consulted and patient was admitted under hospitalist.     9/19  avf nonfunctional.   No sob or chest pain.  Has post op pain.  AFVSS  9/20  afvss.  Pt doesn't want to see previous vascular surgeon who put his avf in.  No alternative available.  Transfer center called.  Has temporary trialysis catheter.  Patient seen on hemodialysis for uremic clearance and ultrafiltration.    9/21  2.5L off w/HD yesterday.  Tmax 101.8, pressures ok.  Lab results reviewed, Hgb low but better than yesterday.  C/o post op pain, no other complaints.        Plan of Care:       Assessment:    esrd  --continue dialysis per routine  --fluid restrict  --renal dose medication per routine  --continue outpt medication  --continue binders with meals    Anemia  --erythropoiesis stimulating agent with renal replacement therapy    Hyperphosphatemia  --renal diet  --continue binders    Hypertension  --uf with hd  --fluid restrict  --low salt diet  --continue home  medication    Hyperkalemia  --2k dialysate  --low k diet    AVF malfunction  --got temporary line  --transfer center called to try to find vascular surgeon.   If unable to do so he will need a tunneled catheter.  Hopefully if it comes to that we can get general surgery to do it.           Thank you for allowing us to participate in this patient's care. We will continue to follow.    Vital Signs:  Temp Readings from Last 3 Encounters:   09/21/24 99.2 °F (37.3 °C) (Oral)   08/22/24 97.3 °F (36.3 °C) (Temporal)   07/30/24 98.6 °F (37 °C) (Oral)       Pulse Readings from Last 3 Encounters:   09/21/24 90   08/29/24 (!) 115   08/22/24 110       BP Readings from Last 3 Encounters:   09/21/24 116/62   08/29/24 99/68   08/22/24 (!) 137/91       Weight:  Wt Readings from Last 3 Encounters:   09/18/24 134 kg (295 lb 6.7 oz)   08/29/24 131.3 kg (289 lb 7.4 oz)   08/22/24 131 kg (288 lb 12.8 oz)       Past Medical & Surgical History:  Past Medical History:   Diagnosis Date    Allergy     Anemia     Anxiety     CHF (congestive heart failure)     Depression     High blood pressure with chronic kidney disease, stage 5 chronic kidney disease or end stage renal disease     Hypertension        Past Surgical History:   Procedure Laterality Date    ABSCESS DRAINAGE Left 9/17/2024    Procedure: DRAINAGE, ABSCESS, GROIN;  Surgeon: Galileo Connors MD;  Location: [facility];  Service: General;  Laterality: Left;    FISTULOGRAM Bilateral 4/30/2024    Procedure: Fistulogram;  Surgeon: Khoobehi, Ali, MD;  Location: [facility] CATH/EP LAB;  Service: Vascular;  Laterality: Bilateral;    HERNIA REPAIR Right     With mesh    INSERTION, CATHETER, TUNNELED N/A 6/22/2023    Procedure: Insertion,catheter,tunneled;  Surgeon: Everett Caicedo MD;  Location: [facility] OR;  Service: General;  Laterality: N/A;    PERCUTANEOUS TRANSLUMINAL ANGIOPLASTY OF ARTERIOVENOUS FISTULA Left 4/30/2024    Procedure: PTA, AV FISTULA;  Surgeon: Khoobehi, Ali, MD;  Location: [facility]  CATH/EP LAB;  Service: Vascular;  Laterality: Left;    PHLEBOGRAPHY N/A 7/19/2024    Procedure: Venogram;  Surgeon: Khoobehi, Ali, MD;  Location: [facility] CATH/EP LAB;  Service: Vascular;  Laterality: N/A;    REMOVAL, TUNNELED CATH Right 7/19/2024    Procedure: REMOVAL, TUNNELED CATH;  Surgeon: Khoobehi, Ali, MD;  Location: [facility] CATH/EP LAB;  Service: Vascular;  Laterality: Right;    ROBOT-ASSISTED LAPAROSCOPIC RESECTION OF SIGMOID COLON USING DA DELANO XI N/A 9/17/2024    Procedure: XI ROBOTIC SIGMOID RESECTION, WITH ANASTAMOSIS;  Surgeon: Galileo Connors MD;  Location: [facility] OR;  Service: General;  Laterality: N/A;  possible open have instruments available       Past Social History:  Social History     Socioeconomic History    Marital status: Single   Tobacco Use    Smoking status: Never     Passive exposure: Never    Smokeless tobacco: Never   Substance and Sexual Activity    Alcohol use: Never    Drug use: Never    Sexual activity: Not Currently   Social History Narrative    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     Social Determinants of Health     Financial Resource Strain: Low Risk  (9/18/2024)    Overall Financial Resource Strain (CARDIA)     Difficulty of Paying Living Expenses: Not very hard   Food Insecurity: No Food Insecurity (9/18/2024)    Hunger Vital Sign     Worried About Running Out of Food in the Last Year: Never true     Ran Out of Food in the Last Year: Never true   Transportation Needs: No Transportation Needs (9/18/2024)    TRANSPORTATION NEEDS     Transportation : No   Physical Activity: Sufficiently Active (1/3/2023)    Exercise Vital Sign     Days of Exercise per Week: 6 days     Minutes of Exercise per Session: 60 min   Recent Concern: Physical Activity - Insufficiently Active (10/11/2022)    Exercise Vital Sign     Days of Exercise per Week: 3 days     Minutes of Exercise per Session: 30 min   Stress: Stress Concern Present (9/18/2024)    [employer] - Occupational Stress  Questionnaire     Feeling of Stress : Very much   Housing Stability: Low Risk  (9/18/2024)    Housing Stability Vital Sign     Unable to Pay for Housing in the Last Year: No     Homeless in the Last Year: No       Medications:  No current facility-administered medications on file prior to encounter.     Current Outpatient Medications on File Prior to Encounter   Medication Sig Dispense Refill    apixaban (ELIQUIS) 2.5 mg Tab Take 2.5 mg by mouth 2 (two) times daily.      calcitRIOL (ROCALTROL) 0.25 MCG Cap Take 1 capsule by mouth once daily (Patient taking differently: Take 0.25 mcg by mouth once daily.) 90 capsule 1    cholecalciferol, vitamin D3, 125 mcg (5,000 unit) Tab Take 1 tablet by mouth once daily.      cloNIDine (CATAPRES) 0.3 MG tablet TAKE 1 TABLET BY MOUTH THREE TIMES DAILY 270 tablet 2    isosorbide mononitrate (IMDUR) 120 MG 24 hr tablet Take 120 mg by mouth once daily.      metoprolol succinate (TOPROL-XL) 50 MG 24 hr tablet Take 150 mg by mouth once daily.      olmesartan (BENICAR) 40 MG tablet Take 1 tablet by mouth once daily 90 tablet 0    sevelamer carbonate (RENVELA) 800 mg Tab Take 2 tablets (1,600 mg total) by mouth 3 (three) times daily with meals. 180 tablet 11    calcium carbonate (TUMS) 200 mg calcium (500 mg) chewable tablet Take 2 tablets (1,000 mg total) by mouth 3 (three) times daily with meals. (Patient taking differently: Take 1,000 mg by mouth 3 (three) times daily.) 180 tablet 11    hydrALAZINE (APRESOLINE) 100 MG tablet Take 1 tablet (100 mg total) by mouth 3 (three) times daily. 90 tablet 11     Scheduled Meds:   bisacodyL  5 mg Oral QHS    ceFEPime IV (PEDS and ADULTS)  2 g Intravenous Q24H    clindamycin in D5W  900 mg Intravenous Q6H    cloNIDine  0.3 mg Oral TID    gabapentin  200 mg Oral BID    heparin (porcine)  7,500 Units Subcutaneous Q8H    hydrALAZINE  100 mg Oral TID    isosorbide mononitrate  120 mg Oral Daily    metoprolol succinate  150 mg Oral Daily    mupirocin    Nasal BID    olmesartan  40 mg Oral Daily    senna-docusate 8.6-50 mg  1 tablet Oral Daily    sevelamer carbonate  1,600 mg Oral TID WM    sodium zirconium cyclosilicate  10 g Oral TID     Continuous Infusions:      PRN Meds:.  Current Facility-Administered Medications:     0.9%  NaCl infusion (for blood administration), , Intravenous, Q24H PRN    0.9%  NaCl infusion (for blood administration), , Intravenous, Q24H PRN    0.9%  NaCl infusion (for blood administration), , Intravenous, Q24H PRN    0.9% NaCl, , Intravenous, PRN    acetaminophen, 650 mg, Oral, Q8H PRN    acetaminophen, 650 mg, Oral, Q4H PRN    aluminum-magnesium hydroxide-simethicone, 30 mL, Oral, QID PRN    dextrose 10%, 12.5 g, Intravenous, PRN    dextrose 10%, 12.5 g, Intravenous, PRN    dextrose 10%, 12.5 g, Intravenous, PRN    dextrose 10%, 25 g, Intravenous, PRN    dextrose 10%, 25 g, Intravenous, PRN    dextrose 10%, 25 g, Intravenous, PRN    glucagon (human recombinant), 1 mg, Intramuscular, PRN    glucose, 16 g, Oral, PRN    glucose, 24 g, Oral, PRN    heparin (porcine), 4,000 Units, Intravenous, PRN    HYDROcodone-acetaminophen, 1 tablet, Oral, Q4H PRN    HYDROmorphone, 1 mg, Intravenous, Q4H PRN    insulin aspart U-100, 0-5 Units, Subcutaneous, QID (AC + HS) PRN    magnesium oxide, 800 mg, Oral, PRN    magnesium oxide, 800 mg, Oral, PRN    melatonin, 6 mg, Oral, Nightly PRN    naloxone, 0.02 mg, Intravenous, PRN    ondansetron, 4 mg, Intravenous, Q6H PRN    potassium bicarbonate, 35 mEq, Oral, PRN    potassium bicarbonate, 50 mEq, Oral, PRN    potassium bicarbonate, 60 mEq, Oral, PRN    potassium, sodium phosphates, 2 packet, Oral, PRN    potassium, sodium phosphates, 2 packet, Oral, PRN    potassium, sodium phosphates, 2 packet, Oral, PRN    sodium chloride 0.9%, 250 mL, Intravenous, PRN    sodium chloride 0.9%, 3 mL, Intravenous, Q12H PRN    Pharmacy to dose Vancomycin consult, , , Once **AND** vancomycin - pharmacy to dose, , Intravenous,  "pharmacy to manage frequency    Allergies:  Mushroom    Past Family History:  Reviewed; refer to Hospitalist Admission Note    Review of Systems:  Review of Systems - All 14 systems reviewed and negative, except as noted in HPI    Physical Exam:    /62   Pulse 90   Temp 99.2 °F (37.3 °C) (Oral)   Resp 19   Ht 6' 2" (1.88 m)   Wt 134 kg (295 lb 6.7 oz)   SpO2 95%   BMI 37.93 kg/m²     General Appearance:    Alert, cooperative, no distress, appears stated age   Head:    Normocephalic, without obvious abnormality, atraumatic   Eyes:    PER, conjunctiva/corneas clear, EOM's intact in both eyes        Throat:   Lips, mucosa, and tongue normal; teeth and gums normal   Back:     Symmetric, no curvature, ROM normal, no CVA tenderness   Lungs:     Clear to auscultation bilaterally, respirations unlabored   Chest wall:    No tenderness or deformity   Heart:    Regular rate and rhythm, S1 and S2 normal, no murmur, rub   or gallop   Abdomen:     Soft, non-tender, bowel sounds active all four quadrants,     no masses, no organomegaly   Extremities:   Extremities normal, atraumatic, no cyanosis or edema   Pulses:   2+ and symmetric all extremities   MSK:   No joint or muscle swelling, tenderness or deformity   Skin:   Skin color, texture, turgor normal, no rashes or lesions   Neurologic:   CNII-XII intact, normal strength and sensation       Throughout.  No flap     Results:  Lab Results   Component Value Date     (L) 09/21/2024    K 4.5 09/21/2024    CL 97 09/21/2024    CO2 24 09/21/2024    BUN 38 (H) 09/21/2024    CREATININE 11.6 (H) 09/21/2024    CALCIUM 8.5 (L) 09/21/2024    ANIONGAP 13 09/21/2024       Lab Results   Component Value Date    CALCIUM 8.5 (L) 09/21/2024    PHOS 5.2 (H) 09/20/2024       Recent Labs   Lab 09/21/24  0558   WBC 13.43*   RBC 2.50*   HGB 7.6*   HCT 24.4*      MCV 98   MCH 30.4   MCHC 31.1*          I have personally reviewed pertinent radiological imaging and " reports.    Kellie Doty NP    [facility] [place]  [phone]

## 2024-09-21 NOTE — PROGRESS NOTES
[facility]  Progress Note    Patient Name: João Ham  MRN: [id number]  Patient Class: IP- Inpatient   Admission Date: 9/17/2024  Length of Stay: 4 days  Attending Physician: Ag Reyes, *  Primary Care Provider: Dawson Granado MD        Subjective:     Principal Problem:Inguinal hernia of left side with gangrene        HPI:  Patient is a 44 year old male with history of ESDR on dialysis MWF, awaiting kidney transplant, HTN, presented to ED with 3 days history of left groin pain and swelling. States swelling comes every time he cough and is being painful. Patient has low grade fever 99s at home. He has been vomiting bilious fluid last 2-3 days. No diarrhea or abdominal pain.     In the ED CT abdomen showed Left inguinal hernia containing fat and air as well as marked inflammation extending from inside the pelvis, in the hernia and down into the left scrotum. This is consistent with an infected inguinal hernia, possible gangrene. WBC was 14, patient was tachycardic. General surgery was consulted and patient was admitted under hospitalist.     Overview/Hospital Course:  Patient is a 44 year old male with history of ESRD, was admitted with suspected inguinal hernia gangrene. General surgery was consulted and patient was taken to OR. Found to have colonic perforation due to mesh erosion with an abscess. Patient underwent sigmoid resection and drainage of the abscess. Patient was started on clindamycin, Vancomycin and cefepime. Nephrology was consulted for chronic dialysis. However patient's AVF was not functioning. General surgery consulted again and trialysis catheter being placed today. Patient is getting dialysis and transfusion during dialysis for low Hb 6.6 and being transferred to [facility] for vascular evaluation.  Patient does not want to be seen by Dr. Khoobehi from vascular surgery.  Attempting to transfer the patient to another medical facility where AV  fistula declotting procedure can be performed.    Interval History:  Patient is seen and examined.  Transferred from [facility] for left arm AV fistula declotting.  Patient does not want to be seen by Dr. Khoobehi from vascular surgery.  Call transfer center. Currently afebrile.      Review of Systems No active complaints  Objective:     Vital Signs (Most Recent):  Temp: 98.5 °F (36.9 °C) (09/21/24 1100)  Pulse: 92 (09/21/24 1100)  Resp: 18 (09/21/24 1100)  BP: (!) 120/58 (notified nurse) (09/21/24 1100)  SpO2: (!) 93 % (09/21/24 1100) Vital Signs (24h Range):  Temp:  [98.1 °F (36.7 °C)-101.8 °F (38.8 °C)] 98.5 °F (36.9 °C)  Pulse:  [] 92  Resp:  [18-20] 18  SpO2:  [93 %-99 %] 93 %  BP: (103-161)/(54-94) 120/58     Weight: 134 kg (295 lb 6.7 oz)  Body mass index is 37.93 kg/m².    Intake/Output Summary (Last 24 hours) at 9/21/2024 1414  Last data filed at 9/21/2024 1259  Gross per 24 hour   Intake 1380 ml   Output 3000 ml   Net -1620 ml      Physical Exam  Vitals and nursing note reviewed.   Constitutional:       General: He is not in acute distress.     Appearance: He is obese. He is not toxic-appearing.   HENT:      Head: Atraumatic.      Mouth/Throat:      Mouth: Mucous membranes are moist.      Pharynx: Oropharynx is clear.   Eyes:      General: No scleral icterus.     Conjunctiva/sclera: Conjunctivae normal.      Pupils: Pupils are equal, round, and reactive to light.   Cardiovascular:      Rate and Rhythm: Regular rhythm.      Heart sounds: No murmur heard.  Pulmonary:      Effort: No respiratory distress.      Breath sounds: No wheezing, rhonchi or rales.   Abdominal:      General: Abdomen is flat. Bowel sounds are normal.      Palpations: Abdomen is soft.   Genitourinary:     Comments: There is left groin dressing and also laparoscopic scars in the abdomen   Musculoskeletal:         General: No swelling or deformity.      Cervical back: No rigidity or tenderness.   Left upper  extremity with fistula in a bandage  Skin:     Coloration: Skin is not jaundiced or pale.      Findings: No bruising, erythema or rash.   Neurological:      General: No focal deficit present.      Mental Status: He is alert and oriented to person, place, and time.      Cranial Nerves: No cranial nerve deficit.      Sensory: No sensory deficit.      Motor: No weakness.   Psychiatric:         Mood and Affect: Mood normal.         Behavior: Behavior normal    Significant Labs: All pertinent labs within the past 24 hours have been reviewed.  CBC:   Recent Labs   Lab 09/20/24  0153 09/21/24  0558   WBC 13.02* 13.43*   HGB 7.3* 7.6*   HCT 22.5* 24.4*    249     CMP:   Recent Labs   Lab 09/20/24  0152 09/20/24  0153 09/21/24  0558   * 135* 134*   K 4.9 5.0 4.5   CL 94* 95 97   CO2 26 26 24    104 106   BUN 42* 42* 38*   CREATININE 13.2* 13.4* 11.6*   CALCIUM 8.2* 8.3* 8.5*   PROT 6.9 6.9 7.0   ALBUMIN 3.2* 3.1* 3.1*   BILITOT 0.6 0.6 0.5   ALKPHOS 71 71 77   AST 26 26 22   ALT 14 14 11   ANIONGAP 13 14 13       Significant Imaging:     CT abdomen and pelvis without contrast:  Left inguinal hernia containing fat and air as well as marked inflammation extending from inside the pelvis, in the hernia and down into the left scrotum.  This is consistent with an infected inguinal hernia, possible gangrene.  Diverticulosis coli without CT evidence of diverticulitis.  2.4 cm cyst of the left kidney.  Small kidneys noted    CXR: No acute abnormality.     CXR:  Central line inserted ending in the superior vena cava. Hypoventilation.   Physical Exam    Assessment/Plan:      * Inguinal hernia of left side with gangrene  Colonic perforation with abscess  CT abdomen shows Left inguinal hernia containing fat and air as well as marked inflammation extending from inside the pelvis, in the hernia and down into the left scrotum. This is consistent with an infected inguinal hernia, possible gangrene.   Patient was taken to  OR and found to have colonic perforation due to mesh erosion. Patient underwent sigmoid resection and I&D on 9/17    - diet per general surgery   - had fever yesterday  - blood cultures and OR culture no growth till date  - Cont Vancomycin, cefepime and Clindamycin        Dialysis AV fistula malfunction  Was unable to access AVF on 9/18  Trialysis catheter placed on 9/19  Vascular surgery evaluation pending for declotting of AV fistula.      Essential hypertension  Chronic, controlled. Latest blood pressure and vitals reviewed-     Home meds for hypertension were reviewed and noted below.   Hypertension Medications               cloNIDine (CATAPRES) 0.3 MG tablet TAKE 1 TABLET BY MOUTH THREE TIMES DAILY    hydrALAZINE (APRESOLINE) 100 MG tablet Take 1 tablet (100 mg total) by mouth 3 (three) times daily.    isosorbide mononitrate (IMDUR) 120 MG 24 hr tablet Take 120 mg by mouth once daily.    metoprolol succinate (TOPROL-XL) 50 MG 24 hr tablet Take 150 mg by mouth once daily.    olmesartan (BENICAR) 40 MG tablet Take 1 tablet by mouth once daily            While in the hospital, will manage blood pressure as follows; Continue home antihypertensive regimen    Will utilize p.r.n. blood pressure medication only if patient's blood pressure greater than 140/90 and he develops symptoms such as worsening chest pain or shortness of breath.    Sleep apnea  CPAP per home settings       Pulmonary hypertension  Resume home meds       Anemia of chronic disease  Anemia is likely due to chronic disease due to ESRD. Most recent hemoglobin and hematocrit are listed below.  Recent Labs     09/19/24  0500 09/20/24  0153 09/21/24  0558   HGB 6.6* 7.3* 7.6*   HCT 20.7* 22.5* 24.4*       Plan  - Monitor serial CBC: Daily  - s/p 2 unit of pRBC on 9/19/24 and 09/20/24    ESRD (end stage renal disease)  Nephrology consulted for routine dialysis MWF, AVF is not functional and tiralysis catheter was placed yesterday.    Plan for AV fistula  declotting.  Renal dose medications   Underwent HD       VTE Risk Mitigation (From admission, onward)           Ordered     heparin (porcine) injection 4,000 Units  As needed (PRN)         09/20/24 1544     heparin (porcine) injection 7,500 Units  Every 8 hours         09/17/24 1031     IP VTE HIGH RISK PATIENT  Once         09/17/24 1031     Place sequential compression device  Until discontinued         09/17/24 1031                    Discharge Planning   MARIA ISABEL: 9/22/2024     Code Status: Full Code   Is the patient medically ready for discharge?:     Reason for patient still in hospital (select all that apply): Treatment  Discharge Plan A: Hospital Transfer                  Ag Reyes MD  Department of Hospital Medicine   [facility]

## 2024-09-21 NOTE — ASSESSMENT & PLAN NOTE
Anemia is likely due to chronic disease due to ESRD. Most recent hemoglobin and hematocrit are listed below.  Recent Labs     09/19/24  0500 09/20/24  0153 09/21/24  0558   HGB 6.6* 7.3* 7.6*   HCT 20.7* 22.5* 24.4*       Plan  - Monitor serial CBC: Daily  - s/p 2 unit of pRBC on 9/19/24 and 09/20/24

## 2024-09-22 LAB
ALBUMIN SERPL BCP-MCNC: 3.2 G/DL (ref 3.5–5.2)
ALP SERPL-CCNC: 87 U/L (ref 55–135)
ALT SERPL W/O P-5'-P-CCNC: 13 U/L (ref 10–44)
ANION GAP SERPL CALC-SCNC: 16 MMOL/L (ref 8–16)
AST SERPL-CCNC: 33 U/L (ref 10–40)
BACTERIA BLD CULT: NORMAL
BACTERIA BLD CULT: NORMAL
BACTERIA UR CULT: NO GROWTH
BASOPHILS NFR BLD: 0 % (ref 0–1.9)
BILIRUB SERPL-MCNC: 0.4 MG/DL (ref 0.1–1)
BUN SERPL-MCNC: 54 MG/DL (ref 6–20)
CALCIUM SERPL-MCNC: 8.7 MG/DL (ref 8.7–10.5)
CHLORIDE SERPL-SCNC: 97 MMOL/L (ref 95–110)
CO2 SERPL-SCNC: 21 MMOL/L (ref 23–29)
CREAT SERPL-MCNC: 14.5 MG/DL (ref 0.5–1.4)
DIFFERENTIAL METHOD BLD: ABNORMAL
EOSINOPHIL NFR BLD: 1 % (ref 0–8)
ERYTHROCYTE [DISTWIDTH] IN BLOOD BY AUTOMATED COUNT: 20 % (ref 11.5–14.5)
EST. GFR  (NO RACE VARIABLE): 3.9 ML/MIN/1.73 M^2
GLUCOSE SERPL-MCNC: 100 MG/DL (ref 70–110)
HCT VFR BLD AUTO: 24.2 % (ref 40–54)
HGB BLD-MCNC: 7.6 G/DL (ref 14–18)
IMM GRANULOCYTES # BLD AUTO: ABNORMAL K/UL (ref 0–0.04)
IMM GRANULOCYTES NFR BLD AUTO: ABNORMAL % (ref 0–0.5)
LYMPHOCYTES NFR BLD: 7 % (ref 18–48)
MAGNESIUM SERPL-MCNC: 2.1 MG/DL (ref 1.6–2.6)
MCH RBC QN AUTO: 31.5 PG (ref 27–31)
MCHC RBC AUTO-ENTMCNC: 31.4 G/DL (ref 32–36)
MCV RBC AUTO: 100 FL (ref 82–98)
METAMYELOCYTES NFR BLD MANUAL: 6 %
MONOCYTES NFR BLD: 10 % (ref 4–15)
MYELOCYTES NFR BLD MANUAL: 2 %
NEUTROPHILS NFR BLD: 58 % (ref 38–73)
NEUTS BAND NFR BLD MANUAL: 16 %
NRBC BLD-RTO: 0 /100 WBC
OHS QRS DURATION: 92 MS
OHS QTC CALCULATION: 469 MS
PLATELET # BLD AUTO: 232 K/UL (ref 150–450)
PLATELET BLD QL SMEAR: ABNORMAL
PMV BLD AUTO: 11.7 FL (ref 9.2–12.9)
POCT GLUCOSE: 122 MG/DL (ref 70–110)
POCT GLUCOSE: 122 MG/DL (ref 70–110)
POCT GLUCOSE: 136 MG/DL (ref 70–110)
POCT GLUCOSE: 155 MG/DL (ref 70–110)
POTASSIUM SERPL-SCNC: 4.9 MMOL/L (ref 3.5–5.1)
PROT SERPL-MCNC: 7.5 G/DL (ref 6–8.4)
RBC # BLD AUTO: 2.41 M/UL (ref 4.6–6.2)
SODIUM SERPL-SCNC: 134 MMOL/L (ref 136–145)
WBC # BLD AUTO: 16.59 K/UL (ref 3.9–12.7)

## 2024-09-22 PROCEDURE — 83735 ASSAY OF MAGNESIUM: CPT | Performed by: INTERNAL MEDICINE

## 2024-09-22 PROCEDURE — 25000003 PHARM REV CODE 250: Performed by: INTERNAL MEDICINE

## 2024-09-22 PROCEDURE — 27000221 HC OXYGEN, UP TO 24 HOURS

## 2024-09-22 PROCEDURE — 63600175 PHARM REV CODE 636 W HCPCS: Performed by: INTERNAL MEDICINE

## 2024-09-22 PROCEDURE — 85007 BL SMEAR W/DIFF WBC COUNT: CPT | Performed by: INTERNAL MEDICINE

## 2024-09-22 PROCEDURE — 85027 COMPLETE CBC AUTOMATED: CPT | Performed by: INTERNAL MEDICINE

## 2024-09-22 PROCEDURE — 25500020 PHARM REV CODE 255

## 2024-09-22 PROCEDURE — 94660 CPAP INITIATION&MGMT: CPT

## 2024-09-22 PROCEDURE — 94761 N-INVAS EAR/PLS OXIMETRY MLT: CPT

## 2024-09-22 PROCEDURE — 36415 COLL VENOUS BLD VENIPUNCTURE: CPT | Performed by: INTERNAL MEDICINE

## 2024-09-22 PROCEDURE — 12000002 HC ACUTE/MED SURGE SEMI-PRIVATE ROOM

## 2024-09-22 PROCEDURE — 94799 UNLISTED PULMONARY SVC/PX: CPT

## 2024-09-22 PROCEDURE — 25000003 PHARM REV CODE 250: Performed by: SURGERY

## 2024-09-22 PROCEDURE — 99223 1ST HOSP IP/OBS HIGH 75: CPT | Mod: ,,, | Performed by: INTERNAL MEDICINE

## 2024-09-22 PROCEDURE — 99900035 HC TECH TIME PER 15 MIN (STAT)

## 2024-09-22 PROCEDURE — 80053 COMPREHEN METABOLIC PANEL: CPT | Performed by: INTERNAL MEDICINE

## 2024-09-22 RX ORDER — FLUCONAZOLE 2 MG/ML
200 INJECTION, SOLUTION INTRAVENOUS
Status: DISCONTINUED | OUTPATIENT
Start: 2024-09-22 | End: 2024-10-02

## 2024-09-22 RX ORDER — HYDROCODONE BITARTRATE AND ACETAMINOPHEN 5; 325 MG/1; MG/1
2 TABLET ORAL EVERY 4 HOURS PRN
Status: DISCONTINUED | OUTPATIENT
Start: 2024-09-22 | End: 2024-09-27

## 2024-09-22 RX ORDER — POLYETHYLENE GLYCOL 3350 17 G/17G
17 POWDER, FOR SOLUTION ORAL DAILY
Status: DISCONTINUED | OUTPATIENT
Start: 2024-09-22 | End: 2024-09-25

## 2024-09-22 RX ORDER — IODIXANOL 320 MG/ML
100 INJECTION, SOLUTION INTRAVASCULAR
Status: COMPLETED | OUTPATIENT
Start: 2024-09-22 | End: 2024-09-22

## 2024-09-22 RX ORDER — FLUCONAZOLE 2 MG/ML
200 INJECTION, SOLUTION INTRAVENOUS
Status: DISCONTINUED | OUTPATIENT
Start: 2024-09-22 | End: 2024-09-22

## 2024-09-22 RX ADMIN — FLUCONAZOLE 200 MG: 2 INJECTION, SOLUTION INTRAVENOUS at 07:09

## 2024-09-22 RX ADMIN — HEPARIN SODIUM 7500 UNITS: 5000 INJECTION INTRAVENOUS; SUBCUTANEOUS at 06:09

## 2024-09-22 RX ADMIN — CEFEPIME 2 G: 2 INJECTION, POWDER, FOR SOLUTION INTRAVENOUS at 12:09

## 2024-09-22 RX ADMIN — SENNOSIDES AND DOCUSATE SODIUM 1 TABLET: 8.6; 5 TABLET ORAL at 09:09

## 2024-09-22 RX ADMIN — DAPTOMYCIN 1340 MG: 500 INJECTION, POWDER, LYOPHILIZED, FOR SOLUTION INTRAVENOUS at 06:09

## 2024-09-22 RX ADMIN — FLUCONAZOLE 200 MG: 2 INJECTION, SOLUTION INTRAVENOUS at 03:09

## 2024-09-22 RX ADMIN — SEVELAMER CARBONATE 1600 MG: 800 TABLET, FILM COATED ORAL at 12:09

## 2024-09-22 RX ADMIN — SEVELAMER CARBONATE 1600 MG: 800 TABLET, FILM COATED ORAL at 09:09

## 2024-09-22 RX ADMIN — PIPERACILLIN SODIUM AND TAZOBACTAM SODIUM 2.25 G: 2; .25 INJECTION, POWDER, LYOPHILIZED, FOR SOLUTION INTRAVENOUS at 04:09

## 2024-09-22 RX ADMIN — GABAPENTIN 200 MG: 100 CAPSULE ORAL at 09:09

## 2024-09-22 RX ADMIN — SEVELAMER CARBONATE 1600 MG: 800 TABLET, FILM COATED ORAL at 06:09

## 2024-09-22 RX ADMIN — METOPROLOL SUCCINATE 150 MG: 50 TABLET, FILM COATED, EXTENDED RELEASE ORAL at 09:09

## 2024-09-22 RX ADMIN — BISACODYL 5 MG: 5 TABLET, COATED ORAL at 08:09

## 2024-09-22 RX ADMIN — ISOSORBIDE MONONITRATE 120 MG: 60 TABLET, EXTENDED RELEASE ORAL at 09:09

## 2024-09-22 RX ADMIN — IODIXANOL 100 ML: 320 INJECTION, SOLUTION INTRAVASCULAR at 04:09

## 2024-09-22 RX ADMIN — SODIUM ZIRCONIUM CYCLOSILICATE 10 G: 10 POWDER, FOR SUSPENSION ORAL at 03:09

## 2024-09-22 RX ADMIN — POLYETHYLENE GLYCOL 3350 17 G: 17 POWDER, FOR SOLUTION ORAL at 03:09

## 2024-09-22 RX ADMIN — HYDRALAZINE HYDROCHLORIDE 100 MG: 25 TABLET ORAL at 03:09

## 2024-09-22 RX ADMIN — GABAPENTIN 200 MG: 100 CAPSULE ORAL at 08:09

## 2024-09-22 RX ADMIN — HEPARIN SODIUM 7500 UNITS: 5000 INJECTION INTRAVENOUS; SUBCUTANEOUS at 03:09

## 2024-09-22 RX ADMIN — CLINDAMYCIN IN 5 PERCENT DEXTROSE 900 MG: 18 INJECTION, SOLUTION INTRAVENOUS at 02:09

## 2024-09-22 RX ADMIN — MUPIROCIN 1 G: 20 OINTMENT TOPICAL at 09:09

## 2024-09-22 RX ADMIN — OLMESARTAN MEDOXOMIL 40 MG: 20 TABLET, FILM COATED ORAL at 09:09

## 2024-09-22 RX ADMIN — HYDRALAZINE HYDROCHLORIDE 100 MG: 25 TABLET ORAL at 09:09

## 2024-09-22 RX ADMIN — CLINDAMYCIN IN 5 PERCENT DEXTROSE 900 MG: 18 INJECTION, SOLUTION INTRAVENOUS at 09:09

## 2024-09-22 RX ADMIN — SODIUM ZIRCONIUM CYCLOSILICATE 10 G: 10 POWDER, FOR SUSPENSION ORAL at 08:09

## 2024-09-22 RX ADMIN — HEPARIN SODIUM 7500 UNITS: 5000 INJECTION INTRAVENOUS; SUBCUTANEOUS at 10:09

## 2024-09-22 RX ADMIN — CLONIDINE HYDROCHLORIDE 0.3 MG: 0.1 TABLET ORAL at 09:09

## 2024-09-22 RX ADMIN — SODIUM ZIRCONIUM CYCLOSILICATE 10 G: 10 POWDER, FOR SUSPENSION ORAL at 09:09

## 2024-09-22 NOTE — CARE UPDATE
09/22/24 0820   Patient Assessment/Suction   Level of Consciousness (AVPU) alert   Respiratory Effort Normal;Unlabored   PRE-TX-O2   Device (Oxygen Therapy) nasal cannula with humidification   $ Is the patient on Low Flow Oxygen? Yes   Flow (L/min) (Oxygen Therapy) 2   SpO2 95 %   Pulse Oximetry Type Intermittent   $ Pulse Oximetry - Multiple Charge Pulse Oximetry - Multiple   Pulse 88   Resp 16   Incentive Spirometer   $ Incentive Spirometer Charges done independently per patient  (eating)   Preset CPAP/BiPAP Settings   Mode Of Delivery Standby   $ CPAP/BiPAP Daily Charge BiPAP/CPAP Daily   $ Is patient using? Yes        Bebeto FLORIAN MA received a call from the patient's parent/guardian to schedule  a follow-up appointment with Dr. Rk Araujo, Ph.D.. Patient's parent/guardian confirmed appointment dates with MA.

## 2024-09-22 NOTE — PROGRESS NOTES
INPATIENT NEPHROLOGY Progress  [facility] NEPHROLOGY    João Ham  09/22/2024    Reason for consultation:    esrd    Chief Complaint:   Chief Complaint   Patient presents with    Fatigue    Groin Swelling     X 2 days.  Hx of Kidney failure and CHF          History of Present Illness:     Per H and P    Patient is a 44 year old male with history of ESDR on dialysis MWF, awaiting kidney transplant, HTN, presented to ED with 3 days history of left groin pain and swelling. States swelling comes every time he cough and is being painful. Patient has low grade fever 99s at home. He has been vomiting bilious fluid last 2-3 days. No diarrhea or abdominal pain.      In the ED CT abdomen showed Left inguinal hernia containing fat and air as well as marked inflammation extending from inside the pelvis, in the hernia and down into the left scrotum. This is consistent with an infected inguinal hernia, possible gangrene. WBC was 14, patient was tachycardic. General surgery was consulted and patient was admitted under hospitalist.     9/19  avf nonfunctional.   No sob or chest pain.  Has post op pain.  AFVSS  9/20  afvss.  Pt doesn't want to see previous vascular surgeon who put his avf in.  No alternative available.  Transfer center called.  Has temporary trialysis catheter.  Patient seen on hemodialysis for uremic clearance and ultrafiltration.    9/21  2.5L off w/HD yesterday.  Tmax 101.8, pressures ok.  Lab results reviewed, Hgb low but better than yesterday.  C/o post op pain, no other complaints.  9/22 POD 5.  VSS, lab results reviewed.  Hgb 7.6, added epo to HD orders.  C/o gas pains, plans to move around more today.  Next HD tomorrow.        Plan of Care:       Assessment:    esrd  --continue dialysis per routine  --fluid restrict  --renal dose medication per routine  --continue outpt medication  --continue binders with meals    Anemia  --erythropoiesis stimulating agent with renal replacement  therapy    Hyperphosphatemia  --renal diet  --continue binders    Hypertension  --uf with hd  --fluid restrict  --low salt diet  --continue home medication    Hyperkalemia  --2k dialysate  --low k diet    AVF malfunction  --got temporary line  --transfer center called to try to find vascular surgeon.   If unable to do so he will need a tunneled catheter.  Hopefully if it comes to that we can get general surgery to do it.           Thank you for allowing us to participate in this patient's care. We will continue to follow.    Vital Signs:  Temp Readings from Last 3 Encounters:   09/22/24 98 °F (36.7 °C) (Oral)   08/22/24 97.3 °F (36.3 °C) (Temporal)   07/30/24 98.6 °F (37 °C) (Oral)       Pulse Readings from Last 3 Encounters:   09/22/24 86   08/29/24 (!) 115   08/22/24 110       BP Readings from Last 3 Encounters:   09/22/24 129/74   08/29/24 99/68   08/22/24 (!) 137/91       Weight:  Wt Readings from Last 3 Encounters:   09/18/24 134 kg (295 lb 6.7 oz)   08/29/24 131.3 kg (289 lb 7.4 oz)   08/22/24 131 kg (288 lb 12.8 oz)       Past Medical & Surgical History:  Past Medical History:   Diagnosis Date    Allergy     Anemia     Anxiety     CHF (congestive heart failure)     Depression     High blood pressure with chronic kidney disease, stage 5 chronic kidney disease or end stage renal disease     Hypertension        Past Surgical History:   Procedure Laterality Date    ABSCESS DRAINAGE Left 9/17/2024    Procedure: DRAINAGE, ABSCESS, GROIN;  Surgeon: Galileo Connors MD;  Location: [facility] OR;  Service: General;  Laterality: Left;    FISTULOGRAM Bilateral 4/30/2024    Procedure: Fistulogram;  Surgeon: Khoobehi, Ali, MD;  Location: [facility] CATH/EP LAB;  Service: Vascular;  Laterality: Bilateral;    HERNIA REPAIR Right     With mesh    INSERTION, CATHETER, TUNNELED N/A 6/22/2023    Procedure: Insertion,catheter,tunneled;  Surgeon: Everett Caicedo MD;  Location: [facility] OR;  Service: General;  Laterality: N/A;    PERCUTANEOUS  TRANSLUMINAL ANGIOPLASTY OF ARTERIOVENOUS FISTULA Left 4/30/2024    Procedure: PTA, AV FISTULA;  Surgeon: Khoobehi, Ali, MD;  Location: [facility] CATH/EP LAB;  Service: Vascular;  Laterality: Left;    PHLEBOGRAPHY N/A 7/19/2024    Procedure: Venogram;  Surgeon: Khoobehi, Ali, MD;  Location: [facility] CATH/EP LAB;  Service: Vascular;  Laterality: N/A;    REMOVAL, TUNNELED CATH Right 7/19/2024    Procedure: REMOVAL, TUNNELED CATH;  Surgeon: Khoobehi, Ali, MD;  Location: [facility] CATH/EP LAB;  Service: Vascular;  Laterality: Right;    ROBOT-ASSISTED LAPAROSCOPIC RESECTION OF SIGMOID COLON USING DA DELANO XI N/A 9/17/2024    Procedure: XI ROBOTIC SIGMOID RESECTION, WITH ANASTAMOSIS;  Surgeon: Galileo Connors MD;  Location: [facility] OR;  Service: General;  Laterality: N/A;  possible open have instruments available       Past Social History:  Social History     Socioeconomic History    Marital status: Single   Tobacco Use    Smoking status: Never     Passive exposure: Never    Smokeless tobacco: Never   Substance and Sexual Activity    Alcohol use: Never    Drug use: Never    Sexual activity: Not Currently   Social History Narrative    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     Social Determinants of Health     Financial Resource Strain: Low Risk  (9/18/2024)    Overall Financial Resource Strain (CARDIA)     Difficulty of Paying Living Expenses: Not very hard   Food Insecurity: No Food Insecurity (9/18/2024)    Hunger Vital Sign     Worried About Running Out of Food in the Last Year: Never true     Ran Out of Food in the Last Year: Never true   Transportation Needs: No Transportation Needs (9/18/2024)    TRANSPORTATION NEEDS     Transportation : No   Physical Activity: Sufficiently Active (1/3/2023)    Exercise Vital Sign     Days of Exercise per Week: 6 days     Minutes of Exercise per Session: 60 min   Recent Concern: Physical Activity - Insufficiently Active (10/11/2022)    Exercise Vital Sign     Days of Exercise per Week: 3 days     Minutes of  Exercise per Session: 30 min   Stress: Stress Concern Present (9/18/2024)    [employer] - Occupational Stress Questionnaire     Feeling of Stress : Very much   Housing Stability: Low Risk  (9/18/2024)    Housing Stability Vital Sign     Unable to Pay for Housing in the Last Year: No     Homeless in the Last Year: No       Medications:  No current facility-administered medications on file prior to encounter.     Current Outpatient Medications on File Prior to Encounter   Medication Sig Dispense Refill    apixaban (ELIQUIS) 2.5 mg Tab Take 2.5 mg by mouth 2 (two) times daily.      calcitRIOL (ROCALTROL) 0.25 MCG Cap Take 1 capsule by mouth once daily (Patient taking differently: Take 0.25 mcg by mouth once daily.) 90 capsule 1    cholecalciferol, vitamin D3, 125 mcg (5,000 unit) Tab Take 1 tablet by mouth once daily.      cloNIDine (CATAPRES) 0.3 MG tablet TAKE 1 TABLET BY MOUTH THREE TIMES DAILY 270 tablet 2    isosorbide mononitrate (IMDUR) 120 MG 24 hr tablet Take 120 mg by mouth once daily.      metoprolol succinate (TOPROL-XL) 50 MG 24 hr tablet Take 150 mg by mouth once daily.      olmesartan (BENICAR) 40 MG tablet Take 1 tablet by mouth once daily 90 tablet 0    sevelamer carbonate (RENVELA) 800 mg Tab Take 2 tablets (1,600 mg total) by mouth 3 (three) times daily with meals. 180 tablet 11    calcium carbonate (TUMS) 200 mg calcium (500 mg) chewable tablet Take 2 tablets (1,000 mg total) by mouth 3 (three) times daily with meals. (Patient taking differently: Take 1,000 mg by mouth 3 (three) times daily.) 180 tablet 11    hydrALAZINE (APRESOLINE) 100 MG tablet Take 1 tablet (100 mg total) by mouth 3 (three) times daily. 90 tablet 11     Scheduled Meds:   bisacodyL  5 mg Oral QHS    ceFEPime IV (PEDS and ADULTS)  2 g Intravenous Q24H    clindamycin in D5W  900 mg Intravenous Q6H    cloNIDine  0.3 mg Oral TID    gabapentin  200 mg Oral BID    heparin (porcine)  7,500 Units  Subcutaneous Q8H    hydrALAZINE  100 mg Oral TID    isosorbide mononitrate  120 mg Oral Daily    metoprolol succinate  150 mg Oral Daily    mupirocin   Nasal BID    olmesartan  40 mg Oral Daily    senna-docusate 8.6-50 mg  1 tablet Oral Daily    sevelamer carbonate  1,600 mg Oral TID WM    sodium zirconium cyclosilicate  10 g Oral TID     Continuous Infusions:      PRN Meds:.  Current Facility-Administered Medications:     0.9%  NaCl infusion (for blood administration), , Intravenous, Q24H PRN    0.9%  NaCl infusion (for blood administration), , Intravenous, Q24H PRN    0.9%  NaCl infusion (for blood administration), , Intravenous, Q24H PRN    0.9% NaCl, , Intravenous, PRN    acetaminophen, 650 mg, Oral, Q8H PRN    acetaminophen, 650 mg, Oral, Q4H PRN    aluminum-magnesium hydroxide-simethicone, 30 mL, Oral, QID PRN    dextrose 10%, 12.5 g, Intravenous, PRN    dextrose 10%, 12.5 g, Intravenous, PRN    dextrose 10%, 12.5 g, Intravenous, PRN    dextrose 10%, 25 g, Intravenous, PRN    dextrose 10%, 25 g, Intravenous, PRN    dextrose 10%, 25 g, Intravenous, PRN    glucagon (human recombinant), 1 mg, Intramuscular, PRN    glucose, 16 g, Oral, PRN    glucose, 24 g, Oral, PRN    heparin (porcine), 4,000 Units, Intravenous, PRN    HYDROcodone-acetaminophen, 1 tablet, Oral, Q4H PRN    HYDROmorphone, 1 mg, Intravenous, Q4H PRN    insulin aspart U-100, 0-5 Units, Subcutaneous, QID (AC + HS) PRN    magnesium oxide, 800 mg, Oral, PRN    magnesium oxide, 800 mg, Oral, PRN    melatonin, 6 mg, Oral, Nightly PRN    naloxone, 0.02 mg, Intravenous, PRN    ondansetron, 4 mg, Intravenous, Q6H PRN    potassium bicarbonate, 35 mEq, Oral, PRN    potassium bicarbonate, 50 mEq, Oral, PRN    potassium bicarbonate, 60 mEq, Oral, PRN    potassium, sodium phosphates, 2 packet, Oral, PRN    potassium, sodium phosphates, 2 packet, Oral, PRN    potassium, sodium phosphates, 2 packet, Oral, PRN    sodium chloride 0.9%, 250 mL, Intravenous, PRN     "sodium chloride 0.9%, 3 mL, Intravenous, Q12H PRN    Pharmacy to dose Vancomycin consult, , , Once **AND** vancomycin - pharmacy to dose, , Intravenous, pharmacy to manage frequency    Allergies:  Mushroom    Past Family History:  Reviewed; refer to Hospitalist Admission Note    Review of Systems:  Review of Systems - All 14 systems reviewed and negative, except as noted in HPI    Physical Exam:    /74   Pulse 86   Temp 98 °F (36.7 °C) (Oral)   Resp 18   Ht 6' 2" (1.88 m)   Wt 134 kg (295 lb 6.7 oz)   SpO2 (!) 92%   BMI 37.93 kg/m²     General Appearance:    Alert, cooperative, no distress, appears stated age   Head:    Normocephalic, without obvious abnormality, atraumatic   Eyes:    PER, conjunctiva/corneas clear, EOM's intact in both eyes        Throat:   Lips, mucosa, and tongue normal; teeth and gums normal   Back:     Symmetric, no curvature, ROM normal, no CVA tenderness   Lungs:     Clear to auscultation bilaterally, respirations unlabored   Chest wall:    No tenderness or deformity   Heart:    Regular rate and rhythm, S1 and S2 normal, no murmur, rub   or gallop   Abdomen:     Soft, non-tender, bowel sounds active all four quadrants,     no masses, no organomegaly   Extremities:   Extremities normal, atraumatic, no cyanosis or edema   Pulses:   2+ and symmetric all extremities   MSK:   No joint or muscle swelling, tenderness or deformity   Skin:   Skin color, texture, turgor normal, no rashes or lesions   Neurologic:   CNII-XII intact, normal strength and sensation       Throughout.  No flap     Results:  Lab Results   Component Value Date     (L) 09/22/2024    K 4.9 09/22/2024    CL 97 09/22/2024    CO2 21 (L) 09/22/2024    BUN 54 (H) 09/22/2024    CREATININE 14.5 (H) 09/22/2024    CALCIUM 8.7 09/22/2024    ANIONGAP 16 09/22/2024       Lab Results   Component Value Date    CALCIUM 8.7 09/22/2024    PHOS 5.2 (H) 09/20/2024       Recent Labs   Lab 09/22/24  0743   WBC 16.59*   RBC 2.41* "   HGB 7.6*   HCT 24.2*      *   MCH 31.5*   MCHC 31.4*          I have personally reviewed pertinent radiological imaging and reports.    Kellie Doty NP    [facility] [place]  [phone]

## 2024-09-22 NOTE — PROGRESS NOTES
[facility] Medicine  Progress Note    Patient Name: João Ham  MRN: [id number]  Patient Class: IP- Inpatient   Admission Date: 9/17/2024  Length of Stay: 5 days  Attending Physician: Ag Reyes, *  Primary Care Provider: Dawson Granado MD        Subjective:     Principal Problem:Inguinal hernia of left side with gangrene        HPI:  Patient is a 44 year old male with history of ESDR on dialysis MWF, awaiting kidney transplant, HTN, presented to ED with 3 days history of left groin pain and swelling. States swelling comes every time he cough and is being painful. Patient has low grade fever 99s at home. He has been vomiting bilious fluid last 2-3 days. No diarrhea or abdominal pain.     In the ED CT abdomen showed Left inguinal hernia containing fat and air as well as marked inflammation extending from inside the pelvis, in the hernia and down into the left scrotum. This is consistent with an infected inguinal hernia, possible gangrene. WBC was 14, patient was tachycardic. General surgery was consulted and patient was admitted under hospitalist.     Overview/Hospital Course:  Patient is a 44 year old male with history of ESRD, was admitted with suspected inguinal hernia gangrene. General surgery was consulted and patient was taken to OR. Found to have colonic perforation due to mesh erosion with an abscess. Patient underwent sigmoid resection and drainage of the abscess. Patient was started on clindamycin, Vancomycin and cefepime. Nephrology was consulted for chronic dialysis. However patient's AVF was not functioning. General surgery consulted again and trialysis catheter being placed today. Patient is getting dialysis and transfusion during dialysis for low Hb 6.6 and being transferred to [facility] for vascular evaluation.  Patient does not want to be seen by Dr. Khoobehi from vascular surgery.  Attempting to transfer the patient to [facility] where AV  fistula declotting procedure can be performed, but given his underlying ongoing conditions/and with primary surgeon at [facility], there recommended to hold off transfer at this time as it is not an emergency and he already has a Trialysis catheter that is functioning.  While on broad-spectrum coverage, patient is spiking fevers and worsening leukocytosis, id consulted, discontinued clindamycin, vancomycin and cefepime-added daptomycin, Diflucan and Zosyn.  Repeated CT abdomen and pelvis.    Interval History:  Patient is having worsening leukocytosis, running low-grade fever since yesterday.  Consulted ID.  Working with transfer given his clotted AV fistula.    Review of Systems patient complained of worsening abdominal pain, he is passing flatus, no bowel movements in the last 2 days.  No distention  Objective:     Vital Signs (Most Recent):  Temp: 98 °F (36.7 °C) (09/22/24 1201)  Pulse: 71 (09/22/24 1201)  Resp: 18 (09/22/24 1201)  BP: 110/75 (09/22/24 1215)  SpO2: 98 % (09/22/24 1201) Vital Signs (24h Range):  Temp:  [98 °F (36.7 °C)-102 °F (38.9 °C)] 98 °F (36.7 °C)  Pulse:  [71-88] 71  Resp:  [16-19] 18  SpO2:  [92 %-100 %] 98 %  BP: ()/(58-80) 110/75     Weight: 134 kg (295 lb 6.7 oz)  Body mass index is 37.93 kg/m².    Intake/Output Summary (Last 24 hours) at 9/22/2024 1711  Last data filed at 9/22/2024 0901  Gross per 24 hour   Intake 480 ml   Output 10 ml   Net 470 ml      Physical Exam  Vitals and nursing note reviewed.   Constitutional:       General: He is not in acute distress.     Appearance: He is obese. He is not toxic-appearing.   HENT:      Head: Atraumatic.      Mouth/Throat:      Mouth: Mucous membranes are moist.      Pharynx: Oropharynx is clear.   Eyes:      General: No scleral icterus.     Conjunctiva/sclera: Conjunctivae normal.      Pupils: Pupils are equal, round, and reactive to light.   Cardiovascular:      Rate and Rhythm: Regular rhythm.      Heart sounds: No murmur heard.  Pulmonary:       Effort: No respiratory distress.      Breath sounds: No wheezing, rhonchi or rales.   Abdominal:      General: Abdomen is flat. Bowel sounds are normal.  Abdominal tenderness present, mild distention present, no guarding, no rigidity.     Palpations: Abdomen is soft.   Genitourinary:     Comments: There is left groin dressing and also laparoscopic scars in the abdomen   Musculoskeletal:         General: No swelling or deformity.      Cervical back: No rigidity or tenderness.   Left upper extremity with fistula in a bandage  Skin:     Coloration: Skin is not jaundiced or pale.      Findings: No bruising, erythema or rash.   Neurological:      General: No focal deficit present.      Mental Status: He is alert and oriented to person, place, and time.      Cranial Nerves: No cranial nerve deficit.      Sensory: No sensory deficit.      Motor: No weakness.   Psychiatric:         Mood and Affect: Mood normal.         Behavior: Behavior normal    Significant Labs: All pertinent labs within the past 24 hours have been reviewed.  CBC:   Recent Labs   Lab 09/21/24  0558 09/22/24  0743   WBC 13.43* 16.59*   HGB 7.6* 7.6*   HCT 24.4* 24.2*    232     CMP:   Recent Labs   Lab 09/21/24  0558 09/22/24  0743   * 134*   K 4.5 4.9   CL 97 97   CO2 24 21*    100   BUN 38* 54*   CREATININE 11.6* 14.5*   CALCIUM 8.5* 8.7   PROT 7.0 7.5   ALBUMIN 3.1* 3.2*   BILITOT 0.5 0.4   ALKPHOS 77 87   AST 22 33   ALT 11 13   ANIONGAP 13 16       Significant Imaging:     CT abdomen and pelvis without contrast:  Left inguinal hernia containing fat and air as well as marked inflammation extending from inside the pelvis, in the hernia and down into the left scrotum.  This is consistent with an infected inguinal hernia, possible gangrene.  Diverticulosis coli without CT evidence of diverticulitis.  2.4 cm cyst of the left kidney.  Small kidneys noted    CXR: No acute abnormality.     CXR:  Central line inserted ending in the  superior vena cava. Hypoventilation.       Assessment/Plan:      * Inguinal hernia of left side with gangrene  Colonic perforation with abscess  CT abdomen shows Left inguinal hernia containing fat and air as well as marked inflammation extending from inside the pelvis, in the hernia and down into the left scrotum. This is consistent with an infected inguinal hernia, possible gangrene.   Patient was taken to OR and found to have colonic perforation due to mesh erosion. Patient underwent sigmoid resection and I&D on 9/17    - diet per general surgery   - repeat episodes of fever   -worsening leukocytosis  -mild abdominal distention, abdominal pain-KUB does not show any signs of ileus/SBO  - blood cultures and OR culture no growth till date  - consulted ID   -recommended to discontinue vancomycin, clindamycin, cefepime   -started on daptomycin, Diflucan and Zosyn  -repeat CT abdomen and pelvis   -reconsult surgery for deeper tissue cultures        Dialysis AV fistula malfunction  Was unable to access AVF on 9/18  Trialysis catheter placed on 9/19  Vascular surgery evaluation for declotting of AV fistula.  Reconsult vascular surgery team in-house tomorrow, holding of transferred to [facility] with his worsening underlying infection, and day clotting being a nonemergent procedure when he already has a functioning dialysis catheter-as per vascular surgery at [facility]      Essential hypertension  Chronic, controlled. Latest blood pressure and vitals reviewed-     Home meds for hypertension were reviewed and noted below.   Hypertension Medications               cloNIDine (CATAPRES) 0.3 MG tablet TAKE 1 TABLET BY MOUTH THREE TIMES DAILY    hydrALAZINE (APRESOLINE) 100 MG tablet Take 1 tablet (100 mg total) by mouth 3 (three) times daily.    isosorbide mononitrate (IMDUR) 120 MG 24 hr tablet Take 120 mg by mouth once daily.    metoprolol succinate (TOPROL-XL) 50 MG 24 hr tablet Take 150 mg by mouth once daily.     olmesartan (BENICAR) 40 MG tablet Take 1 tablet by mouth once daily            While in the hospital, will manage blood pressure as follows; Continue home antihypertensive regimen    Will utilize p.r.n. blood pressure medication only if patient's blood pressure greater than 140/90 and he develops symptoms such as worsening chest pain or shortness of breath.    Sleep apnea  CPAP per home settings       Pulmonary hypertension  Resume home meds       Anemia of chronic disease  Anemia is likely due to chronic disease due to ESRD. Most recent hemoglobin and hematocrit are listed below.  Recent Labs     09/20/24  0153 09/21/24  0558 09/22/24  0743   HGB 7.3* 7.6* 7.6*   HCT 22.5* 24.4* 24.2*       Plan  - Monitor serial CBC: Daily  - s/p 2 unit of pRBC on 9/19/24 and 09/20/24    ESRD (end stage renal disease)  Nephrology consulted for routine dialysis MWF, AVF is not functional and tiralysis catheter was placed yesterday.    Plan for AV fistula declotting.  Consulted vascular surgery a [facility], given the patient's acuity of condition, and declotting being non-emergency, they recommended to reach out to vascular surgery team on week days starting tomorrow.  We will reconsult vascular surgery tomorrow in house  Renal dose medications   Underwent HD       VTE Risk Mitigation (From admission, onward)           Ordered     heparin (porcine) injection 4,000 Units  As needed (PRN)         09/20/24 1544     heparin (porcine) injection 7,500 Units  Every 8 hours         09/17/24 1031     IP VTE HIGH RISK PATIENT  Once         09/17/24 1031     Place sequential compression device  Until discontinued         09/17/24 1031                    Discharge Planning   MARIA ISABEL: 9/23/2024     Code Status: Full Code   Is the patient medically ready for discharge?:     Reason for patient still in hospital (select all that apply): Treatment  Discharge Plan A: Hospital Transfer                  Ag Reyes,  MD  Department of Hospital Medicine   [facility]

## 2024-09-22 NOTE — CARE UPDATE
09/22/24 1137   Patient Assessment/Suction   Level of Consciousness (AVPU) alert   Respiratory Effort Unlabored   PRE-TX-O2   Device (Oxygen Therapy) nasal cannula with humidification   $ Is the patient on Low Flow Oxygen? Yes   Flow (L/min) (Oxygen Therapy) 2   Incentive Spirometer   $ Incentive Spirometer Charges done with encouragement   Administration (IS) instruction provided, follow-up   Number of Repetitions (IS) 1   Level Incentive Spirometer (mL) 3000   Patient Tolerance (IS) no adverse signs/symptoms present     Pt stated he just finished doing on his own.  Did once for me to see and he does well

## 2024-09-22 NOTE — ASSESSMENT & PLAN NOTE
Nephrology consulted for routine dialysis MWF, AVF is not functional and tiralysis catheter was placed yesterday.    Plan for AV fistula declotting.  Consulted vascular surgery a [facility], given the patient's acuity of condition, and declotting being non-emergency, they recommended to reach out to vascular surgery team on week days starting tomorrow.  We will reconsult vascular surgery tomorrow in house  Renal dose medications   Underwent HD

## 2024-09-22 NOTE — SUBJECTIVE & OBJECTIVE
Interval History:  Patient is having worsening leukocytosis, running low-grade fever since yesterday.  Consulted ID.  Working with transfer given his clotted AV fistula.    Review of Systems patient complained of worsening abdominal pain, he is passing flatus, no bowel movements in the last 2 days.  No distention  Objective:     Vital Signs (Most Recent):  Temp: 98 °F (36.7 °C) (09/22/24 1201)  Pulse: 71 (09/22/24 1201)  Resp: 18 (09/22/24 1201)  BP: 110/75 (09/22/24 1215)  SpO2: 98 % (09/22/24 1201) Vital Signs (24h Range):  Temp:  [98 °F (36.7 °C)-102 °F (38.9 °C)] 98 °F (36.7 °C)  Pulse:  [71-88] 71  Resp:  [16-19] 18  SpO2:  [92 %-100 %] 98 %  BP: ()/(58-80) 110/75     Weight: 134 kg (295 lb 6.7 oz)  Body mass index is 37.93 kg/m².    Intake/Output Summary (Last 24 hours) at 9/22/2024 1711  Last data filed at 9/22/2024 0901  Gross per 24 hour   Intake 480 ml   Output 10 ml   Net 470 ml      Physical Exam  Vitals and nursing note reviewed.   Constitutional:       General: He is not in acute distress.     Appearance: He is obese. He is not toxic-appearing.   HENT:      Head: Atraumatic.      Mouth/Throat:      Mouth: Mucous membranes are moist.      Pharynx: Oropharynx is clear.   Eyes:      General: No scleral icterus.     Conjunctiva/sclera: Conjunctivae normal.      Pupils: Pupils are equal, round, and reactive to light.   Cardiovascular:      Rate and Rhythm: Regular rhythm.      Heart sounds: No murmur heard.  Pulmonary:      Effort: No respiratory distress.      Breath sounds: No wheezing, rhonchi or rales.   Abdominal:      General: Abdomen is flat. Bowel sounds are normal.  Abdominal tenderness present, mild distention present, no guarding, no rigidity.     Palpations: Abdomen is soft.   Genitourinary:     Comments: There is left groin dressing and also laparoscopic scars in the abdomen   Musculoskeletal:         General: No swelling or deformity.      Cervical back: No rigidity or tenderness.   Left  upper extremity with fistula in a bandage  Skin:     Coloration: Skin is not jaundiced or pale.      Findings: No bruising, erythema or rash.   Neurological:      General: No focal deficit present.      Mental Status: He is alert and oriented to person, place, and time.      Cranial Nerves: No cranial nerve deficit.      Sensory: No sensory deficit.      Motor: No weakness.   Psychiatric:         Mood and Affect: Mood normal.         Behavior: Behavior normal    Significant Labs: All pertinent labs within the past 24 hours have been reviewed.  CBC:   Recent Labs   Lab 09/21/24  0558 09/22/24  0743   WBC 13.43* 16.59*   HGB 7.6* 7.6*   HCT 24.4* 24.2*    232     CMP:   Recent Labs   Lab 09/21/24  0558 09/22/24  0743   * 134*   K 4.5 4.9   CL 97 97   CO2 24 21*    100   BUN 38* 54*   CREATININE 11.6* 14.5*   CALCIUM 8.5* 8.7   PROT 7.0 7.5   ALBUMIN 3.1* 3.2*   BILITOT 0.5 0.4   ALKPHOS 77 87   AST 22 33   ALT 11 13   ANIONGAP 13 16       Significant Imaging:     CT abdomen and pelvis without contrast:  Left inguinal hernia containing fat and air as well as marked inflammation extending from inside the pelvis, in the hernia and down into the left scrotum.  This is consistent with an infected inguinal hernia, possible gangrene.  Diverticulosis coli without CT evidence of diverticulitis.  2.4 cm cyst of the left kidney.  Small kidneys noted    CXR: No acute abnormality.     CXR:  Central line inserted ending in the superior vena cava. Hypoventilation.

## 2024-09-22 NOTE — ASSESSMENT & PLAN NOTE
Was unable to access AVF on 9/18  Trialysis catheter placed on 9/19  Vascular surgery evaluation for declotting of AV fistula.  Reconsult vascular surgery team in-house tomorrow, holding of transferred to [facility] with his worsening underlying infection, and day clotting being a nonemergent procedure when he already has a functioning dialysis catheter-as per vascular surgery at [facility]

## 2024-09-22 NOTE — ASSESSMENT & PLAN NOTE
Colonic perforation with abscess  CT abdomen shows Left inguinal hernia containing fat and air as well as marked inflammation extending from inside the pelvis, in the hernia and down into the left scrotum. This is consistent with an infected inguinal hernia, possible gangrene.   Patient was taken to OR and found to have colonic perforation due to mesh erosion. Patient underwent sigmoid resection and I&D on 9/17    - diet per general surgery   - repeat episodes of fever   -worsening leukocytosis  -mild abdominal distention, abdominal pain-KUB does not show any signs of ileus/SBO  - blood cultures and OR culture no growth till date  - consulted ID   -recommended to discontinue vancomycin, clindamycin, cefepime   -started on daptomycin, Diflucan and Zosyn  -repeat CT abdomen and pelvis   -reconsult surgery for deeper tissue cultures

## 2024-09-22 NOTE — CONSULTS
Consult Note  Infectious Disease    Reason for Consult:      HPI: João Ham is a 44 y.o. male known to ID, who has a PMHx of obesity, BMI 37.9, CLOVIS, diastolic CHF, grade 3 DD, moderate pulmonary HTN, moderate to severe TR, mild-to-moderate MR, HTN, ESRD on HD since June 20, 2023, on MWF, at [facility], HD access issues in the past including failed left wrist AV fistula, left forearm AV fistula, right IJ Vas-Cath Staph aureus infection in February 20, 2024, Staph aureus bacteremia,    Patient came  complaining of lower abdominal pain.  CT was abnormal, concerning for infected left inguinal hernia.  He underwent surgical intervention with robotic segmental colectomy with opening of L inguinal canal( secondary to mesh erosion into the sigmoid colon right lower).  Patient now has a right quadrant drain and 2 Penrose on the left groin.    WBC is worsening and he spiked fevers, despite surgical intervention/source control, and receiving   antibiotics x6 days ( cefepime, vancomycin and clindamycin)  T-max 100.4° on 09/19   T-max 101.8° on 09/20   T-max 102° F on 09/21    Patient has a left forearm fistula that as thrombosed.  Hospitalist is working with vascular surgeon at [facility] how to address it.  Patient has a left IJ for HD at this time.  Dressing in place, looks clean     I came and saw patient.  He looks like he is in pain, pain varies from bearable to 10/10, intermittent, cramping, worse over the low for abdomen with move would.    Antibiotics (From admission, onward)      Start     Stop Route Frequency Ordered    09/20/24 1200  cefepime 2 g in dextrose 5% 100 mL IVPB (ready to mix)         -- IV Every 24 hours (non-standard times) 09/19/24 2329    09/17/24 2100  mupirocin 2 % ointment         09/22/24 2059 Nasl 2 times daily 09/17/24 1845    09/17/24 1930  clindamycin in D5W 900 mg/50 mL IVPB 900 mg         -- IV Every 6 hours (non-standard times) 09/17/24 1916    09/17/24 1031  vancomycin - pharmacy to dose   "(vancomycin IVPB (PEDS and ADULTS))        Placed in "And" Linked Group    -- IV pharmacy to manage frequency 09/17/24 0931           Antifungals (From admission, onward)      None          Antivirals (From admission, onward)      None            Review of patient's allergies indicates:   Allergen Reactions    Mushroom Swelling     Tongue swells    Tongue swells       Tongue swells     Past Medical History:   Diagnosis Date    Allergy     Anemia     Anxiety     CHF (congestive heart failure)     Depression     High blood pressure with chronic kidney disease, stage 5 chronic kidney disease or end stage renal disease     Hypertension      Past Surgical History:   Procedure Laterality Date    ABSCESS DRAINAGE Left 9/17/2024    Procedure: DRAINAGE, ABSCESS, GROIN;  Surgeon: Galileo Connors MD;  Location: [facility] OR;  Service: General;  Laterality: Left;    FISTULOGRAM Bilateral 4/30/2024    Procedure: Fistulogram;  Surgeon: Khoobehi, Ali, MD;  Location: [facility] CATH/EP LAB;  Service: Vascular;  Laterality: Bilateral;    HERNIA REPAIR Right     With mesh    INSERTION, CATHETER, TUNNELED N/A 6/22/2023    Procedure: Insertion,catheter,tunneled;  Surgeon: Everett Caicedo MD;  Location: [facility] OR;  Service: General;  Laterality: N/A;    PERCUTANEOUS TRANSLUMINAL ANGIOPLASTY OF ARTERIOVENOUS FISTULA Left 4/30/2024    Procedure: PTA, AV FISTULA;  Surgeon: Khoobehi, Ali, MD;  Location: [facility] CATH/EP LAB;  Service: Vascular;  Laterality: Left;    PHLEBOGRAPHY N/A 7/19/2024    Procedure: Venogram;  Surgeon: Khoobehi, Ali, MD;  Location: [facility] CATH/EP LAB;  Service: Vascular;  Laterality: N/A;    REMOVAL, TUNNELED CATH Right 7/19/2024    Procedure: REMOVAL, TUNNELED CATH;  Surgeon: Khoobehi, Ali, MD;  Location: [facility] CATH/EP LAB;  Service: Vascular;  Laterality: Right;    ROBOT-ASSISTED LAPAROSCOPIC RESECTION OF SIGMOID COLON USING DA DELANO XI N/A 9/17/2024    Procedure: XI ROBOTIC SIGMOID RESECTION, WITH ANASTAMOSIS;  Surgeon: Waylon, " Galileo GILLILAND MD;  Location: [facility] OR;  Service: General;  Laterality: N/A;  possible open have instruments available     Social History     Tobacco Use    Smoking status: Never     Passive exposure: Never    Smokeless tobacco: Never   Substance Use Topics    Alcohol use: Never   Lives with family/knife you.  Has been in [place], [facility], [place], [place].     No family history on file.      Review of Systems   Constitutional:  Positive for activity change (He has not been walking much because of pain in lower abdomen) and fatigue. Negative for appetite change, chills, diaphoresis, fever and unexpected weight change.   HENT:  Negative for congestion, dental problem, ear pain, hearing loss, mouth sores, postnasal drip, rhinorrhea, sinus pain, sore throat and trouble swallowing.    Eyes:  Negative for photophobia, pain and visual disturbance.   Respiratory:  Negative for cough, choking, chest tightness and shortness of breath.         He wears BiPAP at night   He wears O2 at daytime.    He is working well with IS and) about 3000.   Cardiovascular:  Negative for chest pain, palpitations and leg swelling.   Gastrointestinal:  Positive for abdominal pain. Negative for anal bleeding, blood in stool, constipation, diarrhea, nausea, rectal pain and vomiting.        No bowel movements for at least 5 days.    He is passing gas.    Lower abdominal pain worse over the indurated area and over the left testicle   Endocrine: Negative for polydipsia and polyuria.   Genitourinary:  Negative for difficulty urinating, dysuria, flank pain, frequency, genital sores, hematuria, penile discharge, scrotal swelling and urgency.   Musculoskeletal:  Negative for arthralgias, back pain, gait problem, joint swelling and myalgias.   Skin:  Negative for rash and wound.   Allergic/Immunologic: Negative for environmental allergies, food allergies and immunocompromised state.   Neurological:  Negative for dizziness, syncope, weakness, numbness and  "headaches.   Hematological:  Negative for adenopathy. Does not bruise/bleed easily.   Psychiatric/Behavioral:  Negative for dysphoric mood and sleep disturbance. The patient is not nervous/anxious.         Pertinent medications noted:     EXAM & DIAGNOSTICS REVIEWED:   Vitals:     Temp:  [98 °F (36.7 °C)-102 °F (38.9 °C)]   Temp: 98 °F (36.7 °C) (09/22/24 1201)  Pulse: 71 (09/22/24 1201)  Resp: 18 (09/22/24 1201)  BP: 110/75 (09/22/24 1215)  SpO2: 98 % (09/22/24 1201)    Intake/Output Summary (Last 24 hours) at 9/22/2024 1335  Last data filed at 9/22/2024 0901  Gross per 24 hour   Intake 480 ml   Output 10 ml   Net 470 ml        Blood pressure 110/75, pulse 71, temperature 98 °F (36.7 °C), temperature source Oral, resp. rate 18, height 6' 2" (1.88 m), weight 134 kg (295 lb 6.7 oz), SpO2 98%.  Body mass index is 37.93 kg/m².  Physical Exam  Constitutional:       General: He is not in acute distress.     Appearance: He is obese. He is not diaphoretic.   HENT:      Head: Atraumatic.      Comments: Wearing nasal cannula--2 L.         Right Ear: Tympanic membrane and external ear normal.      Left Ear: Tympanic membrane and external ear normal.      Nose: Nose normal.      Mouth/Throat:      Mouth: Mucous membranes are moist.   Eyes:      General: No scleral icterus.     Extraocular Movements: Extraocular movements intact.      Conjunctiva/sclera: Conjunctivae normal.      Pupils: Pupils are equal, round, and reactive to light.   Neck:      Vascular: No JVD.   Cardiovascular:      Rate and Rhythm: Normal rate and regular rhythm.      Heart sounds: Normal heart sounds.   Pulmonary:      Effort: Pulmonary effort is normal.      Breath sounds: Normal breath sounds. No wheezing or rales.   Chest:      Chest wall: No tenderness.   Abdominal:      General: Bowel sounds are normal. There is no distension.      Palpations: Abdomen is soft. There is no mass.      Tenderness: There is no abdominal tenderness. There is no guarding " or rebound.   Genitourinary:     Comments: Please see pictures.    Left testicular more swollen and tender than right  Musculoskeletal:         General: Normal range of motion.      Cervical back: Normal range of motion and neck supple.   Lymphadenopathy:      Cervical: No cervical adenopathy.   Skin:     General: Skin is warm and dry.      Findings: No erythema or rash.      Comments: Please see pictures.  There is impressive induration of the left testicular and left groin area.  Penrose in place with foul-smelling discharge   Neurological:      General: No focal deficit present.      Mental Status: He is alert and oriented to person, place, and time.      Cranial Nerves: No cranial nerve deficit.   Psychiatric:         Behavior: Behavior normal.         Thought Content: Thought content normal.         VAD:           Right lateral lower abdominal drain has minimal thin dark blood    Surgical wounds look clear as above          Left groin looks as this picture.  The Penrose is making a circular fashion from the left suprapubic to the left testicular area.  More lateral on the left there is a Penrose, not sure how deep it goes.          General Labs reviewed:  Recent Labs   Lab 09/20/24  0153 09/21/24  0558 09/22/24  0743   WBC 13.02* 13.43* 16.59*   HGB 7.3* 7.6* 7.6*   HCT 22.5* 24.4* 24.2*    249 232       Recent Labs   Lab 09/20/24  0153 09/21/24  0558 09/22/24  0743   * 134* 134*   K 5.0 4.5 4.9   CL 95 97 97   CO2 26 24 21*   BUN 42* 38* 54*   CREATININE 13.4* 11.6* 14.5*   CALCIUM 8.3* 8.5* 8.7   PROT 6.9 7.0 7.5   BILITOT 0.6 0.5 0.4   ALKPHOS 71 77 87   ALT 14 11 13   AST 26 22 33     Lab Results   Component Value Date    CRP >16.00 (H) 09/20/2024    CRP >16.00 (H) 07/16/2024        Micro:  Microbiology Results (last 7 days)       Procedure Component Value Units Date/Time    Urine culture [[id number]] Collected: 09/19/24 1435    Order Status: Completed Specimen: Urine Updated: 09/22/24 0793      Urine Culture, Routine No growth    Narrative:      Specimen Source->Urine    Blood culture x two cultures. Draw prior to antibiotics. [[id number]] Collected: 09/17/24 0949    Order Status: Completed Specimen: Blood from Peripheral, Antecubital, Right Updated: 09/21/24 1632     Blood Culture, Routine No Growth to date      No Growth to date      No Growth to date      No Growth to date      No Growth to date    Narrative:      Aerobic and anaerobic    Blood culture x two cultures. Draw prior to antibiotics. [[id number]] Collected: 09/17/24 0949    Order Status: Completed Specimen: Blood from Peripheral, Antecubital, Right Updated: 09/21/24 1632     Blood Culture, Routine No Growth to date      No Growth to date      No Growth to date      No Growth to date      No Growth to date    Narrative:      Aerobic and anaerobic            Susceptibility data from last 999 days.  Collected Specimen Info Organism Ceftriaxone Clindamycin Erythromycin Oxacillin Penicillin Tetracycline Trimeth/Sulfa Vancomycin   07/19/24 Catheter Tip, Tunneled Staphylococcus aureus  S  S  I  S  R  S  S  S   07/16/24 Blood from Peripheral, Forearm, Right Staphylococcus aureus           07/15/24 Blood from Peripheral, Antecubital, Right Staphylococcus aureus  S  S  S  S  R  S  S  S   07/15/24 Blood from Peripheral, Hand, Right Staphylococcus aureus           02/27/24 Blood No growth after 5 days.           02/27/24 Blood No growth after 5 days.               Imaging Reviewed:  09/22/2024 KUB Small to moderate volume of stool and gas in the bowel with a nonobstructive bowel gas pattern.     09/19/2024 chest x-ray Central line inserted ending in the superior vena cava.  Hypoventilation.     09/17/2024 chest x-ray No acute abnormality.     09/17/2024 CT abdomen and pelvis Left inguinal hernia containing fat and air as well as marked inflammation extending from inside the pelvis, in the hernia and down into the left scrotum.  This is consistent with an  infected inguinal hernia, possible gangrene.   Diverticulosis coli without CT evidence of diverticulitis.  2.4 cm cyst of the left kidney.  Small kidneys noted     IMPRESSION & PLAN       Incarcerated left inguinal hernia in setting of left inguinal mesh erosion into sigmoid diverticulum, colonic perforation, and left inguinal canal abscess. Lower abdominal pain, Leukocytosis and fever despite appropriate treatment  Status post robotic sigmoid colectomy and IND on 09/17/2024 and 5 days of cefepime, vancomycin, clindamycin  Up-to-date with Td  No intraop cultures sent    History of   Staphylococcus aureus infection of his right IJ Vas-Cath in February 20, 2024 managed with vancomycin Q dialysis and exchange of Vas-Cath.  Later developed pain and tenderness at the MediPort site and the Vas-Cath was removed and replaced at the same site in April 20, 2024.     History of Staphylococcus  aureus bacteremia in July 20, 2024, right IJ tunnel catheter removed on 07/19/2024. treated with cefazolin x 2 weeks with HD.     PMHx of  ESRD on HD since June '23,  MWF, at [facility]  on [address];  failed left wrist AV fistula, then needed left forearm AV fistula, right IJ tunnel catheter removed on 07/19/2024.  Currently using left IJ    PMHx of  obesity, BMI 37.9, CLOVIS,       PMHx of  diastolic CHF, grade 3 DD, mod pulm HTN, mod-severe TR, mild-to-moderate MR, HTN    Chart review extensive workup done last year:   non reactive RPR,   negative TB gold,   negative HIV,   positive Strongyloides IgG received ivermectin,   non reactive for hepatitis-C antibody,   non reactive for hepatitis-B core antibody    This patient is high risk for life-threatening deterioration and death secondary to above comorbidities and need for IV treatment      RECOMMENDATIONS  Discontinue vancomycin, cefepime, and clindamycin   Start daptomycin 10 milligrams/kilogram IV Q 48   Start Zosyn 2.25 g IV Q 8  Start Diflucan 200 mg IV q.day   CT abdomen and pelvis to  rule out remaining abscess   I am not sure why there are no cultures pending.  Will ask surgeon if they can collect some proper deep cultures.          I spent a total of 55 minutes on the day of the visit.This includes face to face time and non-face to face time preparing to see the patient (eg, review of tests), obtaining and/or reviewing separately obtained history, documenting clinical information in the electronic or other health record, independently interpreting results and communicating results to the patient/family/caregiver, or care coordinator.  This note has been used by [employer] dictation software, which may have errors in typing

## 2024-09-22 NOTE — CARE UPDATE
09/21/24 2037   Patient Assessment/Suction   Level of Consciousness (AVPU) alert   Respiratory Effort Normal   Expansion/Accessory Muscles/Retractions no use of accessory muscles   All Lung Fields Breath Sounds clear   Skin Integrity   $ Wound Care Tech Time 15 min   Area Observed Left;Right;Cheek;Bridge of nose;Nares   Skin Appearance without discoloration   PRE-TX-O2   Device (Oxygen Therapy) nasal cannula with humidification   Flow (L/min) (Oxygen Therapy) 3   SpO2 (!) 93 %  (FOUND ON RA PLACED BACK ON O2)   Pulse Oximetry Type Intermittent   $ Pulse Oximetry - Multiple Charge Pulse Oximetry - Multiple   Pulse 82   Resp 19   Incentive Spirometer   $ Incentive Spirometer Charges done with encouragement   Administration (IS) instruction provided, follow-up   Preset CPAP/BiPAP Settings   Mode Of Delivery BiPAP;Standby   Education   $ Education BiPAP;Incentive Spirometry;15 min   Respiratory Evaluation   $ Care Plan Tech Time 15 min

## 2024-09-22 NOTE — ASSESSMENT & PLAN NOTE
Anemia is likely due to chronic disease due to ESRD. Most recent hemoglobin and hematocrit are listed below.  Recent Labs     09/20/24  0153 09/21/24  0558 09/22/24  0743   HGB 7.3* 7.6* 7.6*   HCT 22.5* 24.4* 24.2*       Plan  - Monitor serial CBC: Daily  - s/p 2 unit of pRBC on 9/19/24 and 09/20/24

## 2024-09-23 LAB
ALBUMIN SERPL BCP-MCNC: 3.2 G/DL (ref 3.5–5.2)
ALP SERPL-CCNC: 114 U/L (ref 55–135)
ALT SERPL W/O P-5'-P-CCNC: 26 U/L (ref 10–44)
ANION GAP SERPL CALC-SCNC: 17 MMOL/L (ref 8–16)
AST SERPL-CCNC: 46 U/L (ref 10–40)
BASOPHILS # BLD AUTO: ABNORMAL K/UL (ref 0–0.2)
BASOPHILS NFR BLD: 0 % (ref 0–1.9)
BILIRUB SERPL-MCNC: 0.5 MG/DL (ref 0.1–1)
BUN SERPL-MCNC: 61 MG/DL (ref 6–20)
CALCIUM SERPL-MCNC: 9.1 MG/DL (ref 8.7–10.5)
CHLORIDE SERPL-SCNC: 93 MMOL/L (ref 95–110)
CO2 SERPL-SCNC: 22 MMOL/L (ref 23–29)
CREAT SERPL-MCNC: 16 MG/DL (ref 0.5–1.4)
DIFFERENTIAL METHOD BLD: ABNORMAL
EOSINOPHIL # BLD AUTO: ABNORMAL K/UL (ref 0–0.5)
EOSINOPHIL NFR BLD: 1 % (ref 0–8)
ERYTHROCYTE [DISTWIDTH] IN BLOOD BY AUTOMATED COUNT: 19.9 % (ref 11.5–14.5)
EST. GFR  (NO RACE VARIABLE): 3.4 ML/MIN/1.73 M^2
GLUCOSE SERPL-MCNC: 120 MG/DL (ref 70–110)
HCT VFR BLD AUTO: 25.4 % (ref 40–54)
HGB BLD-MCNC: 8 G/DL (ref 14–18)
IMM GRANULOCYTES # BLD AUTO: ABNORMAL K/UL (ref 0–0.04)
IMM GRANULOCYTES NFR BLD AUTO: ABNORMAL % (ref 0–0.5)
LYMPHOCYTES # BLD AUTO: ABNORMAL K/UL (ref 1–4.8)
LYMPHOCYTES NFR BLD: 12 % (ref 18–48)
MAGNESIUM SERPL-MCNC: 2.2 MG/DL (ref 1.6–2.6)
MCH RBC QN AUTO: 30.7 PG (ref 27–31)
MCHC RBC AUTO-ENTMCNC: 31.5 G/DL (ref 32–36)
MCV RBC AUTO: 97 FL (ref 82–98)
METAMYELOCYTES NFR BLD MANUAL: 1 %
MONOCYTES # BLD AUTO: ABNORMAL K/UL (ref 0.3–1)
MONOCYTES NFR BLD: 2 % (ref 4–15)
MYELOCYTES NFR BLD MANUAL: 1 %
NEUTROPHILS NFR BLD: 77 % (ref 38–73)
NEUTS BAND NFR BLD MANUAL: 6 %
NRBC BLD-RTO: 0 /100 WBC
PLATELET # BLD AUTO: 357 K/UL (ref 150–450)
PLATELET BLD QL SMEAR: ABNORMAL
PMV BLD AUTO: 10.5 FL (ref 9.2–12.9)
POCT GLUCOSE: 143 MG/DL (ref 70–110)
POCT GLUCOSE: 153 MG/DL (ref 70–110)
POTASSIUM SERPL-SCNC: 4 MMOL/L (ref 3.5–5.1)
PROT SERPL-MCNC: 7.5 G/DL (ref 6–8.4)
RBC # BLD AUTO: 2.61 M/UL (ref 4.6–6.2)
SODIUM SERPL-SCNC: 132 MMOL/L (ref 136–145)
WBC # BLD AUTO: 17.64 K/UL (ref 3.9–12.7)

## 2024-09-23 PROCEDURE — 25000003 PHARM REV CODE 250: Performed by: INTERNAL MEDICINE

## 2024-09-23 PROCEDURE — 25000003 PHARM REV CODE 250: Performed by: SURGERY

## 2024-09-23 PROCEDURE — 63600175 PHARM REV CODE 636 W HCPCS: Performed by: SURGERY

## 2024-09-23 PROCEDURE — 80053 COMPREHEN METABOLIC PANEL: CPT | Performed by: INTERNAL MEDICINE

## 2024-09-23 PROCEDURE — 85007 BL SMEAR W/DIFF WBC COUNT: CPT | Performed by: INTERNAL MEDICINE

## 2024-09-23 PROCEDURE — 99900031 HC PATIENT EDUCATION (STAT)

## 2024-09-23 PROCEDURE — 94761 N-INVAS EAR/PLS OXIMETRY MLT: CPT

## 2024-09-23 PROCEDURE — 27000221 HC OXYGEN, UP TO 24 HOURS

## 2024-09-23 PROCEDURE — 63600175 PHARM REV CODE 636 W HCPCS: Performed by: INTERNAL MEDICINE

## 2024-09-23 PROCEDURE — 63600175 PHARM REV CODE 636 W HCPCS: Mod: JZ,JG | Performed by: NURSE PRACTITIONER

## 2024-09-23 PROCEDURE — 27100171 HC OXYGEN HIGH FLOW UP TO 24 HOURS

## 2024-09-23 PROCEDURE — 94660 CPAP INITIATION&MGMT: CPT

## 2024-09-23 PROCEDURE — 25000003 PHARM REV CODE 250

## 2024-09-23 PROCEDURE — 85027 COMPLETE CBC AUTOMATED: CPT | Performed by: INTERNAL MEDICINE

## 2024-09-23 PROCEDURE — 99233 SBSQ HOSP IP/OBS HIGH 50: CPT | Mod: ,,, | Performed by: INTERNAL MEDICINE

## 2024-09-23 PROCEDURE — 36415 COLL VENOUS BLD VENIPUNCTURE: CPT | Performed by: INTERNAL MEDICINE

## 2024-09-23 PROCEDURE — 12000002 HC ACUTE/MED SURGE SEMI-PRIVATE ROOM

## 2024-09-23 PROCEDURE — 90935 HEMODIALYSIS ONE EVALUATION: CPT

## 2024-09-23 PROCEDURE — 83735 ASSAY OF MAGNESIUM: CPT | Performed by: INTERNAL MEDICINE

## 2024-09-23 PROCEDURE — 99900035 HC TECH TIME PER 15 MIN (STAT)

## 2024-09-23 RX ADMIN — PIPERACILLIN SODIUM AND TAZOBACTAM SODIUM 2.25 G: 2; .25 INJECTION, POWDER, LYOPHILIZED, FOR SOLUTION INTRAVENOUS at 01:09

## 2024-09-23 RX ADMIN — PIPERACILLIN SODIUM AND TAZOBACTAM SODIUM 2.25 G: 2; .25 INJECTION, POWDER, LYOPHILIZED, FOR SOLUTION INTRAVENOUS at 05:09

## 2024-09-23 RX ADMIN — POLYETHYLENE GLYCOL 3350 17 G: 17 POWDER, FOR SOLUTION ORAL at 08:09

## 2024-09-23 RX ADMIN — HEPARIN SODIUM 7500 UNITS: 5000 INJECTION INTRAVENOUS; SUBCUTANEOUS at 01:09

## 2024-09-23 RX ADMIN — ISOSORBIDE MONONITRATE 120 MG: 60 TABLET, EXTENDED RELEASE ORAL at 08:09

## 2024-09-23 RX ADMIN — DAPTOMYCIN 1340 MG: 500 INJECTION, POWDER, LYOPHILIZED, FOR SOLUTION INTRAVENOUS at 06:09

## 2024-09-23 RX ADMIN — OLMESARTAN MEDOXOMIL 40 MG: 20 TABLET, FILM COATED ORAL at 08:09

## 2024-09-23 RX ADMIN — SEVELAMER CARBONATE 1600 MG: 800 TABLET, FILM COATED ORAL at 01:09

## 2024-09-23 RX ADMIN — CLONIDINE HYDROCHLORIDE 0.3 MG: 0.1 TABLET ORAL at 08:09

## 2024-09-23 RX ADMIN — SEVELAMER CARBONATE 1600 MG: 800 TABLET, FILM COATED ORAL at 05:09

## 2024-09-23 RX ADMIN — FLUCONAZOLE 200 MG: 2 INJECTION, SOLUTION INTRAVENOUS at 05:09

## 2024-09-23 RX ADMIN — SODIUM ZIRCONIUM CYCLOSILICATE 10 G: 10 POWDER, FOR SUSPENSION ORAL at 09:09

## 2024-09-23 RX ADMIN — SODIUM ZIRCONIUM CYCLOSILICATE 10 G: 10 POWDER, FOR SUSPENSION ORAL at 06:09

## 2024-09-23 RX ADMIN — HEPARIN SODIUM 7500 UNITS: 5000 INJECTION INTRAVENOUS; SUBCUTANEOUS at 06:09

## 2024-09-23 RX ADMIN — BISACODYL 5 MG: 5 TABLET, COATED ORAL at 09:09

## 2024-09-23 RX ADMIN — METOPROLOL SUCCINATE 150 MG: 50 TABLET, FILM COATED, EXTENDED RELEASE ORAL at 08:09

## 2024-09-23 RX ADMIN — HYDROCODONE BITARTRATE AND ACETAMINOPHEN 2 TABLET: 5; 325 TABLET ORAL at 07:09

## 2024-09-23 RX ADMIN — CLONIDINE HYDROCHLORIDE 0.3 MG: 0.1 TABLET ORAL at 05:09

## 2024-09-23 RX ADMIN — PIPERACILLIN SODIUM AND TAZOBACTAM SODIUM 2.25 G: 2; .25 INJECTION, POWDER, LYOPHILIZED, FOR SOLUTION INTRAVENOUS at 08:09

## 2024-09-23 RX ADMIN — SODIUM ZIRCONIUM CYCLOSILICATE 10 G: 10 POWDER, FOR SUSPENSION ORAL at 08:09

## 2024-09-23 RX ADMIN — GABAPENTIN 200 MG: 100 CAPSULE ORAL at 09:09

## 2024-09-23 RX ADMIN — HYDROMORPHONE HYDROCHLORIDE 1 MG: 1 INJECTION, SOLUTION INTRAMUSCULAR; INTRAVENOUS; SUBCUTANEOUS at 05:09

## 2024-09-23 RX ADMIN — HEPARIN SODIUM 7500 UNITS: 5000 INJECTION INTRAVENOUS; SUBCUTANEOUS at 09:09

## 2024-09-23 RX ADMIN — HYDRALAZINE HYDROCHLORIDE 100 MG: 25 TABLET ORAL at 05:09

## 2024-09-23 RX ADMIN — SENNOSIDES AND DOCUSATE SODIUM 1 TABLET: 8.6; 5 TABLET ORAL at 08:09

## 2024-09-23 RX ADMIN — SEVELAMER CARBONATE 1600 MG: 800 TABLET, FILM COATED ORAL at 08:09

## 2024-09-23 RX ADMIN — EPOETIN ALFA-EPBX 10000 UNITS: 10000 INJECTION, SOLUTION INTRAVENOUS; SUBCUTANEOUS at 01:09

## 2024-09-23 RX ADMIN — GABAPENTIN 200 MG: 100 CAPSULE ORAL at 08:09

## 2024-09-23 RX ADMIN — CLONIDINE HYDROCHLORIDE 0.3 MG: 0.1 TABLET ORAL at 09:09

## 2024-09-23 NOTE — CONSULTS
Formerly [facility]  Vascular Surgery  Consult Note    Inpatient consult to Vascular Surgery  Consult performed by: Lorraine Salvador MD  Consult ordered by: Ag Reyes MD      Inpatient consult to Vascular Surgery  Consult performed by: Lorraine Salvador MD  Consult ordered by: Oni Garcia MD        Subjective:       History of Present Illness: Patient is a 44 year old male with history of ESRD on dialysis MWF, awaiting kidney transplant, HTN, presented to ED with 3 days history of left groin pain and swelling. Workup showed left inguinal hernia containing fat and air. General surgery was consulted and patient was taken to OR. Found to have colonic perforation due to mesh erosion with an abscess. Patient underwent sigmoid resection and drainage of the abscess on 9/20/24. Patient reports his last HD through fistula was on 9/16/24. He states that when they attempted to use the fistula in the hospital the needle was sticking out. They called his usual dialysis nurse who said the needle can stick out a little, but they were not comfortable doing that here. A trialysis catheter was placed in the hospital. The patient has history of angioplasty of the fistula. It is a left radiocephalic fistula. He states that the thrill cannot be felt close to the wrist.     Medications Prior to Admission   Medication Sig Dispense Refill Last Dose    apixaban (ELIQUIS) 2.5 mg Tab Take 2.5 mg by mouth 2 (two) times daily.   9/17/2024 at 06:00    calcitRIOL (ROCALTROL) 0.25 MCG Cap Take 1 capsule by mouth once daily (Patient taking differently: Take 0.25 mcg by mouth once daily.) 90 capsule 1 9/16/2024    cholecalciferol, vitamin D3, 125 mcg (5,000 unit) Tab Take 1 tablet by mouth once daily.   9/17/2024    cloNIDine (CATAPRES) 0.3 MG tablet TAKE 1 TABLET BY MOUTH THREE TIMES DAILY 270 tablet 2 9/17/2024 at 06:00    isosorbide mononitrate (IMDUR) 120 MG 24 hr tablet Take 120 mg by mouth once daily.   9/17/2024 at  06:00    metoprolol succinate (TOPROL-XL) 50 MG 24 hr tablet Take 150 mg by mouth once daily.   9/17/2024 at 06:00    olmesartan (BENICAR) 40 MG tablet Take 1 tablet by mouth once daily 90 tablet 0 9/17/2024 at 06:00    sevelamer carbonate (RENVELA) 800 mg Tab Take 2 tablets (1,600 mg total) by mouth 3 (three) times daily with meals. 180 tablet 11 9/17/2024    calcium carbonate (TUMS) 200 mg calcium (500 mg) chewable tablet Take 2 tablets (1,000 mg total) by mouth 3 (three) times daily with meals. (Patient taking differently: Take 1,000 mg by mouth 3 (three) times daily.) 180 tablet 11     hydrALAZINE (APRESOLINE) 100 MG tablet Take 1 tablet (100 mg total) by mouth 3 (three) times daily. 90 tablet 11        Review of patient's allergies indicates:   Allergen Reactions    Mushroom Swelling     Tongue swells    Tongue swells       Tongue swells       Past Medical History:   Diagnosis Date    Allergy     Anemia     Anxiety     CHF (congestive heart failure)     Depression     High blood pressure with chronic kidney disease, stage 5 chronic kidney disease or end stage renal disease     Hypertension      Past Surgical History:   Procedure Laterality Date    ABSCESS DRAINAGE Left 9/17/2024    Procedure: DRAINAGE, ABSCESS, GROIN;  Surgeon: Galileo Connors MD;  Location: [facility];  Service: General;  Laterality: Left;    FISTULOGRAM Bilateral 4/30/2024    Procedure: Fistulogram;  Surgeon: Khoobehi, Ali, MD;  Location: [facility] CATH/EP LAB;  Service: Vascular;  Laterality: Bilateral;    HERNIA REPAIR Right     With mesh    INSERTION, CATHETER, TUNNELED N/A 6/22/2023    Procedure: Insertion,catheter,tunneled;  Surgeon: Everett Caicedo MD;  Location: [facility] OR;  Service: General;  Laterality: N/A;    PERCUTANEOUS TRANSLUMINAL ANGIOPLASTY OF ARTERIOVENOUS FISTULA Left 4/30/2024    Procedure: PTA, AV FISTULA;  Surgeon: Khoobehi, Ali, MD;  Location: [facility] CATH/EP LAB;  Service: Vascular;  Laterality: Left;    PHLEBOGRAPHY N/A  7/19/2024    Procedure: Venogram;  Surgeon: Khoobehi, Ali, MD;  Location: [facility] CATH/EP LAB;  Service: Vascular;  Laterality: N/A;    REMOVAL, TUNNELED CATH Right 7/19/2024    Procedure: REMOVAL, TUNNELED CATH;  Surgeon: Khoobehi, Ali, MD;  Location: [facility] CATH/EP LAB;  Service: Vascular;  Laterality: Right;    ROBOT-ASSISTED LAPAROSCOPIC RESECTION OF SIGMOID COLON USING DA DELANO XI N/A 9/17/2024    Procedure: XI ROBOTIC SIGMOID RESECTION, WITH ANASTAMOSIS;  Surgeon: Galileo Connors MD;  Location: [facility] OR;  Service: General;  Laterality: N/A;  possible open have instruments available     Family History    None       Tobacco Use    Smoking status: Never     Passive exposure: Never    Smokeless tobacco: Never   Substance and Sexual Activity    Alcohol use: Never    Drug use: Never    Sexual activity: Not Currently     Review of Systems  Objective:     Vital Signs (Most Recent):  Temp: 98.8 °F (37.1 °C) (09/23/24 1600)  Pulse: 72 (09/23/24 1600)  Resp: 18 (09/23/24 1754)  BP: 119/75 (09/23/24 1600)  SpO2: 97 % (09/23/24 1600) Vital Signs (24h Range):  Temp:  [98.1 °F (36.7 °C)-99.2 °F (37.3 °C)] 98.8 °F (37.1 °C)  Pulse:  [72-84] 72  Resp:  [17-18] 18  SpO2:  [95 %-100 %] 97 %  BP: (119-161)/(75-90) 119/75     Weight: 134 kg (295 lb 6.7 oz)  Body mass index is 37.93 kg/m².    Date 09/23/24 0700 - 09/24/24 0659   Shift 5818-7444 8344-6130 6839-1085 24 Hour Total   INTAKE   P.O. 120   120   Shift Total(mL/kg) 120(0.9)   120(0.9)   OUTPUT   Shift Total(mL/kg)       Weight (kg) 134 134 134 134       Physical Exam  Constitutional:       Appearance: He is obese.   Cardiovascular:      Rate and Rhythm: Normal rate and regular rhythm.   Musculoskeletal:      Comments: Left forearm fistula  Palpable radial pulse  Strong thrill in fistula, no pulsatility appreciated  Small aneurysmal area with no overlying skin changes  No thrill felt for approximately 2 inches above the wrist, patient states it has always been this way    Skin:     General: Skin is warm and dry.   Neurological:      Mental Status: He is alert.         Assessment/Plan:     44M with malfunctioning fistula.   - Fistulogram tomorrow  - NPO at midnight        Active Diagnoses:    Diagnosis Date Noted POA    PRINCIPAL PROBLEM:  Inguinal hernia of left side with gangrene [K40.40] 09/17/2024 Yes    Dialysis AV fistula malfunction [T82.590A] 09/19/2024 No    Essential hypertension [I10] 07/17/2024 Yes    Sleep apnea [G47.30] 03/31/2023 Yes    Pulmonary hypertension [I27.20] 11/29/2022 Yes    ESRD (end stage renal disease) [N18.6] 10/10/2022 Yes    Anemia of chronic disease [D63.8] 10/10/2022 Yes      Problems Resolved During this Admission:       Thank you for your consult. I will follow-up with patient. Please contact us if you have any additional questions..    Lorraine Salvador MD  Vascular Surgery  [facility]

## 2024-09-23 NOTE — PROGRESS NOTES
INPATIENT NEPHROLOGY [facility]  [facility]    João Ham  09/23/2024    Reason for consultation:  esrd    Chief Complaint:   Chief Complaint   Patient presents with    Fatigue    Groin Swelling     X 2 days.  Hx of Kidney failure and CHF     History of Present Illness:     Per H and P    Patient is a 44 year old male with history of ESDR on dialysis MWF, awaiting kidney transplant, HTN, presented to ED with 3 days history of left groin pain and swelling. States swelling comes every time he cough and is being painful. Patient has low grade fever 99s at home. He has been vomiting bilious fluid last 2-3 days. No diarrhea or abdominal pain.      In the ED CT abdomen showed Left inguinal hernia containing fat and air as well as marked inflammation extending from inside the pelvis, in the hernia and down into the left scrotum. This is consistent with an infected inguinal hernia, possible gangrene. WBC was 14, patient was tachycardic. General surgery was consulted and patient was admitted under hospitalist.     9/19  avf nonfunctional.   No sob or chest pain.  Has post op pain.  AFVSS  9/20  afvss.  Pt doesn't want to see previous vascular surgeon who put his avf in.  No alternative available.  Transfer center called.  Has temporary trialysis catheter.  Patient seen on hemodialysis for uremic clearance and ultrafiltration.    9/21  2.5L off w/HD yesterday.  Tmax 101.8, pressures ok.  Lab results reviewed, Hgb low but better than yesterday.  C/o post op pain, no other complaints.  9/22 POD 5.  VSS, lab results reviewed.  Hgb 7.6, added epo to HD orders.  C/o gas pains, plans to move around more today.  Next HD tomorrow.  9/23 VSS. HD today. Transfuse with next HD as needed if Hgb stil low.    Plan of Care:    ESRD on HD MWF  --continue dialysis per routine  --fluid restrict  --renal dose medication per routine  --continue outpt medication  --continue binders with meals    Anemia of CKD  --erythropoiesis  stimulating agent with renal replacement therapy    Secondary HPT  --renal diet  --continue binders    Hypertension  --uf with hd  --fluid restrict  --low salt diet  --continue home medication    Hyperkalemia  --2k dialysate  --low k diet    AVF malfunction  --got temporary line  --awaiting Vascular eval.      Thank you for allowing us to participate in this patient's care. We will continue to follow.    Vital Signs:  Temp Readings from Last 3 Encounters:   09/23/24 98.7 °F (37.1 °C) (Oral)   08/22/24 97.3 °F (36.3 °C) (Temporal)   07/30/24 98.6 °F (37 °C) (Oral)       Pulse Readings from Last 3 Encounters:   09/23/24 84   08/29/24 (!) 115   08/22/24 110       BP Readings from Last 3 Encounters:   09/23/24 (!) 155/90   08/29/24 99/68   08/22/24 (!) 137/91       Weight:  Wt Readings from Last 3 Encounters:   09/18/24 134 kg (295 lb 6.7 oz)   08/29/24 131.3 kg (289 lb 7.4 oz)   08/22/24 131 kg (288 lb 12.8 oz)       Past Medical & Surgical History:  Past Medical History:   Diagnosis Date    Allergy     Anemia     Anxiety     CHF (congestive heart failure)     Depression     High blood pressure with chronic kidney disease, stage 5 chronic kidney disease or end stage renal disease     Hypertension        Past Surgical History:   Procedure Laterality Date    ABSCESS DRAINAGE Left 9/17/2024    Procedure: DRAINAGE, ABSCESS, GROIN;  Surgeon: Galileo Connors MD;  Location: [facility] OR;  Service: General;  Laterality: Left;    FISTULOGRAM Bilateral 4/30/2024    Procedure: Fistulogram;  Surgeon: Khoobehi, Ali, MD;  Location: [facility] CATH/EP LAB;  Service: Vascular;  Laterality: Bilateral;    HERNIA REPAIR Right     With mesh    INSERTION, CATHETER, TUNNELED N/A 6/22/2023    Procedure: Insertion,catheter,tunneled;  Surgeon: Everett Caicedo MD;  Location: [facility] OR;  Service: General;  Laterality: N/A;    PERCUTANEOUS TRANSLUMINAL ANGIOPLASTY OF ARTERIOVENOUS FISTULA Left 4/30/2024    Procedure: PTA, AV FISTULA;  Surgeon: Khoobehi,  MD Gustavo;  Location: [facility] CATH/EP LAB;  Service: Vascular;  Laterality: Left;    PHLEBOGRAPHY N/A 7/19/2024    Procedure: Venogram;  Surgeon: Khoobehi, Ali, MD;  Location: [facility] CATH/EP LAB;  Service: Vascular;  Laterality: N/A;    REMOVAL, TUNNELED CATH Right 7/19/2024    Procedure: REMOVAL, TUNNELED CATH;  Surgeon: Khoobehi, Ali, MD;  Location: [facility] CATH/EP LAB;  Service: Vascular;  Laterality: Right;    ROBOT-ASSISTED LAPAROSCOPIC RESECTION OF SIGMOID COLON USING DA DELANO XI N/A 9/17/2024    Procedure: XI ROBOTIC SIGMOID RESECTION, WITH ANASTAMOSIS;  Surgeon: Galileo Connors MD;  Location: [facility];  Service: General;  Laterality: N/A;  possible open have instruments available       Past Social History:  Social History     Socioeconomic History    Marital status: Single   Tobacco Use    Smoking status: Never     Passive exposure: Never    Smokeless tobacco: Never   Substance and Sexual Activity    Alcohol use: Never    Drug use: Never    Sexual activity: Not Currently   Social History Narrative    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     Social Determinants of Health     Financial Resource Strain: Low Risk  (9/18/2024)    Overall Financial Resource Strain (CARDIA)     Difficulty of Paying Living Expenses: Not very hard   Food Insecurity: No Food Insecurity (9/18/2024)    Hunger Vital Sign     Worried About Running Out of Food in the Last Year: Never true     Ran Out of Food in the Last Year: Never true   Transportation Needs: No Transportation Needs (9/18/2024)    TRANSPORTATION NEEDS     Transportation : No   Physical Activity: Sufficiently Active (1/3/2023)    Exercise Vital Sign     Days of Exercise per Week: 6 days     Minutes of Exercise per Session: 60 min   Recent Concern: Physical Activity - Insufficiently Active (10/11/2022)    Exercise Vital Sign     Days of Exercise per Week: 3 days     Minutes of Exercise per Session: 30 min   Stress: Stress Concern Present (9/18/2024)    [employer] [place] [employer] -  Occupational Stress Questionnaire     Feeling of Stress : Very much   Housing Stability: Low Risk  (9/18/2024)    Housing Stability Vital Sign     Unable to Pay for Housing in the Last Year: No     Homeless in the Last Year: No       Medications:  No current facility-administered medications on file prior to encounter.     Current Outpatient Medications on File Prior to Encounter   Medication Sig Dispense Refill    apixaban (ELIQUIS) 2.5 mg Tab Take 2.5 mg by mouth 2 (two) times daily.      calcitRIOL (ROCALTROL) 0.25 MCG Cap Take 1 capsule by mouth once daily (Patient taking differently: Take 0.25 mcg by mouth once daily.) 90 capsule 1    cholecalciferol, vitamin D3, 125 mcg (5,000 unit) Tab Take 1 tablet by mouth once daily.      cloNIDine (CATAPRES) 0.3 MG tablet TAKE 1 TABLET BY MOUTH THREE TIMES DAILY 270 tablet 2    isosorbide mononitrate (IMDUR) 120 MG 24 hr tablet Take 120 mg by mouth once daily.      metoprolol succinate (TOPROL-XL) 50 MG 24 hr tablet Take 150 mg by mouth once daily.      olmesartan (BENICAR) 40 MG tablet Take 1 tablet by mouth once daily 90 tablet 0    sevelamer carbonate (RENVELA) 800 mg Tab Take 2 tablets (1,600 mg total) by mouth 3 (three) times daily with meals. 180 tablet 11    calcium carbonate (TUMS) 200 mg calcium (500 mg) chewable tablet Take 2 tablets (1,000 mg total) by mouth 3 (three) times daily with meals. (Patient taking differently: Take 1,000 mg by mouth 3 (three) times daily.) 180 tablet 11    hydrALAZINE (APRESOLINE) 100 MG tablet Take 1 tablet (100 mg total) by mouth 3 (three) times daily. 90 tablet 11     Scheduled Meds:   bisacodyL  5 mg Oral QHS    cloNIDine  0.3 mg Oral TID    DAPTOmycin (CUBICIN) IV (PEDS and ADULTS)  10 mg/kg Intravenous Q48H    DAPTOmycin (CUBICIN) IV (PEDS and ADULTS)  10 mg/kg Intravenous Once    epoetin mily-epbx  10,000 Units Intravenous Every Mon, Wed, Fri    fluconazole (DIFLUCAN) IV (PEDS and ADULTS)  200 mg Intravenous Q24H    gabapentin   200 mg Oral BID    heparin (porcine)  7,500 Units Subcutaneous Q8H    hydrALAZINE  100 mg Oral TID    isosorbide mononitrate  120 mg Oral Daily    metoprolol succinate  150 mg Oral Daily    olmesartan  40 mg Oral Daily    piperacillin-tazobactam (Zosyn) IV (PEDS and ADULTS) (extended infusion is not appropriate)  2.25 g Intravenous Q8H    polyethylene glycol  17 g Oral Daily    senna-docusate 8.6-50 mg  1 tablet Oral Daily    sevelamer carbonate  1,600 mg Oral TID WM    sodium zirconium cyclosilicate  10 g Oral TID     Continuous Infusions:      PRN Meds:.  Current Facility-Administered Medications:     0.9%  NaCl infusion (for blood administration), , Intravenous, Q24H PRN    0.9%  NaCl infusion (for blood administration), , Intravenous, Q24H PRN    0.9%  NaCl infusion (for blood administration), , Intravenous, Q24H PRN    0.9% NaCl, , Intravenous, PRN    acetaminophen, 650 mg, Oral, Q8H PRN    acetaminophen, 650 mg, Oral, Q4H PRN    aluminum-magnesium hydroxide-simethicone, 30 mL, Oral, QID PRN    dextrose 10%, 12.5 g, Intravenous, PRN    dextrose 10%, 12.5 g, Intravenous, PRN    dextrose 10%, 12.5 g, Intravenous, PRN    dextrose 10%, 25 g, Intravenous, PRN    dextrose 10%, 25 g, Intravenous, PRN    dextrose 10%, 25 g, Intravenous, PRN    glucagon (human recombinant), 1 mg, Intramuscular, PRN    glucose, 16 g, Oral, PRN    glucose, 24 g, Oral, PRN    heparin (porcine), 4,000 Units, Intravenous, PRN    HYDROcodone-acetaminophen, 2 tablet, Oral, Q4H PRN    HYDROmorphone, 1 mg, Intravenous, Q4H PRN    insulin aspart U-100, 0-5 Units, Subcutaneous, QID (AC + HS) PRN    iohexoL, 100 mL, Intravenous, ONCE PRN    magnesium oxide, 800 mg, Oral, PRN    magnesium oxide, 800 mg, Oral, PRN    melatonin, 6 mg, Oral, Nightly PRN    naloxone, 0.02 mg, Intravenous, PRN    ondansetron, 4 mg, Intravenous, Q6H PRN    potassium bicarbonate, 35 mEq, Oral, PRN    potassium bicarbonate, 50 mEq, Oral, PRN    potassium bicarbonate, 60  "mEq, Oral, PRN    potassium, sodium phosphates, 2 packet, Oral, PRN    potassium, sodium phosphates, 2 packet, Oral, PRN    potassium, sodium phosphates, 2 packet, Oral, PRN    sodium chloride 0.9%, 250 mL, Intravenous, PRN    sodium chloride 0.9%, 3 mL, Intravenous, Q12H PRN    Allergies:  Mushroom    Past Family History:  Reviewed; refer to Hospitalist Admission Note    Review of Systems:  Review of Systems - All 14 systems reviewed and negative, except as noted in HPI    Physical Exam:    BP (!) 155/90   Pulse 84   Temp 98.7 °F (37.1 °C) (Oral)   Resp 18   Ht 6' 2" (1.88 m)   Wt 134 kg (295 lb 6.7 oz)   SpO2 95%   BMI 37.93 kg/m²     General Appearance:    Alert, cooperative, no distress, appears stated age   Head:    Normocephalic, without obvious abnormality, atraumatic   Eyes:    PER, conjunctiva/corneas clear, EOM's intact in both eyes        Throat:   Lips, mucosa, and tongue normal; teeth and gums normal   Back:     Symmetric, no curvature, ROM normal, no CVA tenderness   Lungs:     Clear to auscultation bilaterally, respirations unlabored   Chest wall:    No tenderness or deformity   Heart:    Regular rate and rhythm, S1 and S2 normal, no murmur, rub   or gallop   Abdomen:     Soft, non-tender, bowel sounds active all four quadrants,     no masses, no organomegaly   Extremities:   Extremities normal, atraumatic, no cyanosis or edema   Pulses:   2+ and symmetric all extremities   MSK:   No joint or muscle swelling, tenderness or deformity   Skin:   Skin color, texture, turgor normal, no rashes or lesions   Neurologic:   CNII-XII intact, normal strength and sensation       Throughout.  No flap     Results:  Lab Results   Component Value Date     (L) 09/22/2024    K 4.9 09/22/2024    CL 97 09/22/2024    CO2 21 (L) 09/22/2024    BUN 54 (H) 09/22/2024    CREATININE 14.5 (H) 09/22/2024    CALCIUM 8.7 09/22/2024    ANIONGAP 16 09/22/2024       Lab Results   Component Value Date    CALCIUM 8.7 " "09/22/2024    PHOS 5.2 (H) 09/20/2024       No results for input(s): "WBC", "RBC", "HGB", "HCT", "PLT", "MCV", "MCH", "MCHC" in the last 24 hours.      Jeremy Madrid MD    [facility] [place]  [phone]     "

## 2024-09-23 NOTE — CARE UPDATE
09/22/24 2013   PRE-TX-O2   Device (Oxygen Therapy) nasal cannula with humidification   $ Is the patient on Low Flow Oxygen? Yes   Flow (L/min) (Oxygen Therapy) 2   SpO2 97 %   $ Pulse Oximetry - Multiple Charge Pulse Oximetry - Multiple   Preset CPAP/BiPAP Settings   Mode Of Delivery BiPAP;Standby

## 2024-09-23 NOTE — PLAN OF CARE
Pt now febrile with increased WBC, infectious disease closely following.  Pt not ready for discharge at this time.     09/23/24 1434   Discharge Reassessment   Assessment Type Discharge Planning Reassessment   Did the patient's condition or plan change since previous assessment? Yes   Discharge Plan A Home with family

## 2024-09-23 NOTE — PROGRESS NOTES
[facility]   Department of Infectious Disease  Progress Note        PATIENT NAME: João Ham  MRN: [id number]  TODAY'S DATE: 09/23/2024  ADMIT DATE: 9/17/2024  LOS: 6 days    CHIEF COMPLAINT: Fatigue and Groin Swelling (X 2 days.  Hx of Kidney failure and CHF)      PRINCIPLE PROBLEM: Inguinal hernia of left side with gangrene    INTERVAL HISTORY      09/23/2024: Seen and evaluated at bedside.  No acute issues.  Leukocytosis persists.  Afebrile.      Antibiotics (From admission, onward)      Start     Stop Route Frequency Ordered    09/23/24 1800  DAPTOmycin (CUBICIN) 1,340 mg in 0.9% NaCl SolP 50 mL IVPB         -- IV Every 48 hours (non-standard times) 09/22/24 1758    09/22/24 1800  DAPTOmycin (CUBICIN) 1,340 mg in 0.9% NaCl SolP 50 mL IVPB         -- IV Once 09/22/24 1801    09/22/24 1600  piperacillin-tazobactam (ZOSYN) 2.25 g in D5W 100 mL         -- IV Every 8 hours (non-standard times) 09/22/24 1529    09/17/24 2100  mupirocin 2 % ointment         09/22/24 2059 Nasl 2 times daily 09/17/24 1845          Antifungals (From admission, onward)      Start     Stop Route Frequency Ordered    09/22/24 1930  fluconazole (DIFLUCAN) IVPB 200 mg         -- IV Every 24 hours (non-standard times) 09/22/24 1520           Antivirals (From admission, onward)      None            ASSESSMENT and PLAN      1. Incarcerated left inguinal hernia with necrosis and left inguinal abscess.  Had I&D of abscess, partial sigmoid colectomy with primary anastomosis on 09/17/2024.  Developed leukocytosis 09/20/2024 which has worsened.  Antibiotics modified 09/22/2024.  CT abdomen and pelvis 09/22/2024 with foci of gas at the anastomotic site.  Need to consider possibility of anastomotic leak and abdominal abscess.  Continue current antibiotics.    2. Leukocytosis.  As above    3. ESRD on hemodialysis Monday/ Wednesday/Friday.  He is on transplant list.  Currently has a nonfunctioning left upper extremity AV  fistula.    RECOMMENDATIONS:    Continue current antibiotics   Continue to monitor abdomen.  Need to keep in mind possibility of anastomosis leak  Check CRP and procalcitonin    Please send Epic secure chat with any questions.      SUBJECTIVE    Review of Systems  Negative except as stated above in Interval History     OBJECTIVE   Temp:  [97.8 °F (36.6 °C)-99.2 °F (37.3 °C)] 98.7 °F (37.1 °C)  Pulse:  [71-85] 84  Resp:  [18] 18  SpO2:  [95 %-100 %] 95 %  BP: ()/(58-90) 155/90  Temp:  [97.8 °F (36.6 °C)-99.2 °F (37.3 °C)]   Temp: 98.7 °F (37.1 °C) (09/23/24 0701)  Pulse: 84 (09/23/24 0701)  Resp: 18 (09/23/24 0701)  BP: (!) 155/90 (09/23/24 0828)  SpO2: 95 % (09/23/24 0701)    Intake/Output Summary (Last 24 hours) at 9/23/2024 1021  Last data filed at 9/23/2024 0852  Gross per 24 hour   Intake 600 ml   Output --   Net 600 ml       Physical Exam  General:  Middle-aged man who is ill-looking.  Man in no acute distress at this time   Abdomen:  Distended, soft, nontender, BERNABE drain with dark fluid, query bilious versus odor.  Serosanguineous drainage from corrugated drains left groin incision.  Bone sounds hypoactive.    CVS: S1 and 2 heard, no murmurs appreciated   Respiratory: Clear to auscultation   Skin: No rash appreciated   Musculoskeletal system: No joint or bony abnormalities appreciated   CNS: No gross focal deficits  Psych: Good mood, normal affect.    VAD:  IJ Vas-Cath, PIV  ISOLATION:  None    WOUNDS:  Abdominal surgical wounds    Significant Labs: All pertinent labs within the past 24 hours have been reviewed.    CBC LAST 7 DAYS  Recent Labs   Lab 09/17/24  0851 09/18/24  0430 09/19/24  0500 09/20/24  0153 09/21/24  0558 09/22/24  0743 09/23/24  0918   WBC 14.64* 9.12 10.77 13.02* 13.43* 16.59* 17.64*   RBC 2.64* 2.36* 2.01* 2.35* 2.50* 2.41* 2.61*   HGB 8.7* 7.7* 6.6* 7.3* 7.6* 7.6* 8.0*   HCT 27.2* 23.9* 20.7* 22.5* 24.4* 24.2* 25.4*   * 101* 103* 96 98 100* 97   MCH 33.0* 32.6* 32.8* 31.1*  "30.4 31.5* 30.7   MCHC 32.0 32.2 31.9* 32.4 31.1* 31.4* 31.5*   RDW 15.9* 15.9* 16.0* 20.7* 20.0* 20.0* 19.9*    255 266 239 249 232 357   MPV 9.6 10.5 10.1 10.3 10.7 11.7 10.5   GRAN 80.1*  11.7* 76.0* 72.7  7.8* 76.0* 55.0 58.0  --    LYMPH 6.0*  0.9* 10.0* 9.0*  1.0 7.0* 15.0* 7.0*  --    MONO 12.0  1.8* 14.0 15.8*  1.7* 5.0 14.0 10.0  --    BASO 0.03  --  0.03  --   --   --   --    NRBC 0 0 0 0 0 0  --        CHEMISTRY LAST 7 DAYS  Recent Labs   Lab 09/18/24  0429 09/19/24  0500 09/20/24  0152 09/20/24  0153 09/21/24  0558 09/22/24  0743 09/23/24  0918   * 135* 133* 135* 134* 134* 132*   K 6.2* 4.4 4.9 5.0 4.5 4.9 4.0   CL 93* 91* 94* 95 97 97 93*   CO2 21* 26 26 26 24 21* 22*   ANIONGAP 20* 18* 13 14 13 16 17*   BUN 47* 56* 42* 42* 38* 54* 61*   CREATININE 16.2* 17.6* 13.2* 13.4* 11.6* 14.5* 16.0*   * 114* 105 104 106 100 120*   CALCIUM 8.3* 8.3* 8.2* 8.3* 8.5* 8.7 9.1   MG 1.9 1.8 1.8 1.8 1.9 2.1 2.2   ALBUMIN 2.5* 2.2* 3.2* 3.1* 3.1* 3.2* 3.2*   PROT 7.3 6.8 6.9 6.9 7.0 7.5 7.5   ALKPHOS 63 84 71 71 77 87 114   ALT 9* 14 14 14 11 13 26   AST 16 33 26 26 22 33 46*   BILITOT 0.8 0.5 0.6 0.6 0.5 0.4 0.5       Estimated Creatinine Clearance: 8.7 mL/min (A) (based on SCr of 16 mg/dL (H)).    INFLAMMATORY/PROCAL  LAST 7 DAYS  Recent Labs   Lab 09/20/24  0152   CRP >16.00*     No results found for: "ESR"  CRP   Date Value Ref Range Status   09/20/2024 >16.00 (H) <1.00 mg/dL Final     Comment:     CRP-Normal Application expected values:   <1.0        mg/dL   Normal Range  1.0 - 5.0  mg/dL   Indicates mild inflammation  5.0 - 10.0 mg/dL   Indicates severe inflammation  >10.0        mg/dL   Represents serious processes and   frequently         indicates the presence of a bacterial   infection.      07/16/2024 >16.00 (H) <1.00 mg/dL Final     Comment:     CRP-Normal Application expected values:   <1.0        mg/dL   Normal Range  1.0 - 5.0  mg/dL   Indicates mild inflammation  5.0 - 10.0 mg/dL  "  Indicates severe inflammation  >10.0        mg/dL   Represents serious processes and   frequently         indicates the presence of a bacterial   infection.          PRIOR  MICROBIOLOGY:    Susceptibility data from last 90 days.  Collected Specimen Info Organism Ceftriaxone Clindamycin Erythromycin Oxacillin Penicillin Tetracycline Trimeth/Sulfa Vancomycin   09/17/24 Blood from Peripheral, Antecubital, Right No growth after 5 days.           09/17/24 Blood from Peripheral, Antecubital, Right No growth after 5 days.           07/19/24 Catheter Tip, Tunneled Staphylococcus aureus  S  S  I  S  R  S  S  S   07/16/24 Blood from Peripheral, Forearm, Right Staphylococcus aureus           07/15/24 Blood from Peripheral, Antecubital, Right Staphylococcus aureus  S  S  S  S  R  S  S  S   07/15/24 Blood from Peripheral, Hand, Right Staphylococcus aureus               LAST 7 DAYS MICROBIOLOGY   Microbiology Results (last 7 days)       Procedure Component Value Units Date/Time    Blood culture x two cultures. Draw prior to antibiotics. [[id number]] Collected: 09/17/24 0949    Order Status: Completed Specimen: Blood from Peripheral, Antecubital, Right Updated: 09/22/24 1632     Blood Culture, Routine No growth after 5 days.    Narrative:      Aerobic and anaerobic    Blood culture x two cultures. Draw prior to antibiotics. [[id number]] Collected: 09/17/24 0949    Order Status: Completed Specimen: Blood from Peripheral, Antecubital, Right Updated: 09/22/24 1632     Blood Culture, Routine No growth after 5 days.    Narrative:      Aerobic and anaerobic    Urine culture [[id number]] Collected: 09/19/24 1435    Order Status: Completed Specimen: Urine Updated: 09/22/24 0740     Urine Culture, Routine No growth    Narrative:      Specimen Source->Urine            CURRENT/PREVIOUS VISIT EKG  Results for orders placed or performed during the hospital encounter of 09/17/24   EKG 12-lead    Collection Time: 09/17/24  8:57 AM   Result  Value Ref Range    QRS Duration 92 ms    [facility] QTC Calculation 469 ms    Narrative    Test Reason : N18.6,    Vent. Rate : 119 BPM     Atrial Rate : 119 BPM     P-R Int : 134 ms          QRS Dur : 092 ms      QT Int : 334 ms       P-R-T Axes : 027 -78 004 degrees     QTc Int : 469 ms    Sinus tachycardia  Left axis deviation  Cannot rule out Anterior infarct ,age undetermined  Abnormal ECG  When compared with ECG of 15-JUL-2024 17:47,  T wave inversion now evident in Inferior leads  T wave inversion no longer evident in Lateral leads  Confirmed by Jose Cruz MD ([id number]) on 9/22/2024 1:02:21 PM    Referred By: BRANDON   SELF           Confirmed By:Jose Cruz MD     Significant Imaging: I have reviewed all relevant and available imaging results/findings within the past 24 hours.    I spent a total of 55 minutes on the day of the visit.This includes face to face time and non-face to face time preparing to see the patient (eg, review of tests), obtaining and/or reviewing separately obtained history, documenting clinical information in the electronic or other health record, independently interpreting results and communicating results to the patient/family/caregiver, or care coordinator.    Kerwin Au MD  Date of Service: 09/23/2024      This note was created using [employer] voice recognition software that occasionally misinterpreted phrases or words.

## 2024-09-23 NOTE — PROGRESS NOTES
Pharmacy Consult Discontinuation: Vancomycin    João Ham [id number] is a 44 y.o. male was consulted for vancomycin pharmacotherapy management by pharmacy.    Pharmacy consult for vancomycin dosing is no longer required.  Vancomycin was discontinued 09/22/2024.    Thank you for allowing us to participate in this patient's care. Should you have any questions or concerns please feel free to contact the pharmacy department at [phone].    [place] Mcg[place], EdwinD

## 2024-09-23 NOTE — PROGRESS NOTES
Pt seen and examined. Resting comfortably in bed.   Continues to have BMs and flatus. .  Notes occasional crampy abd pain  CT from yesterday reviewed.  Findings suggest phlegmon in the inguinal canal.    Induration on exam much improved from previous.  Cont to drain mostly serosanguinous fluid from groin wound    Wt Readings from Last 3 Encounters:   09/18/24 134 kg (295 lb 6.7 oz)   08/29/24 131.3 kg (289 lb 7.4 oz)   08/22/24 131 kg (288 lb 12.8 oz)     Temp Readings from Last 3 Encounters:   09/23/24 98.1 °F (36.7 °C) (Oral)   08/22/24 97.3 °F (36.3 °C) (Temporal)   07/30/24 98.6 °F (37 °C) (Oral)     BP Readings from Last 3 Encounters:   09/23/24 (!) 161/75   08/29/24 99/68   08/22/24 (!) 137/91     Pulse Readings from Last 3 Encounters:   09/23/24 82   08/29/24 (!) 115   08/22/24 110     No fever past 24 hours.  AAox3  Soft/nd/appt ttp.  BS present  BERNABE serous drainage    Lab Results   Component Value Date    WBC 17.64 (H) 09/23/2024    HGB 8.0 (L) 09/23/2024    HCT 25.4 (L) 09/23/2024    MCV 97 09/23/2024     09/23/2024       .las  BMP  Lab Results   Component Value Date     (L) 09/23/2024    K 4.0 09/23/2024    CL 93 (L) 09/23/2024    CO2 22 (L) 09/23/2024    BUN 61 (H) 09/23/2024    CREATININE 16.0 (H) 09/23/2024    CALCIUM 9.1 09/23/2024    ANIONGAP 17 (H) 09/23/2024    EGFRNORACEVR 3.4 (A) 09/23/2024     A/P: s/p Robotic colectomy  Ambulate  Aggressive is  Will get US to evaluate L scrotal area.  No undrained abscess appreciated.  This appears to be phlegmon which issmaller than what he presented with.    Vasc surgery to evaluate and treat AVF

## 2024-09-23 NOTE — PROGRESS NOTES
[facility]  Progress Note    Patient Name: João Ham  MRN: [id number]  Patient Class: IP- Inpatient   Admission Date: 9/17/2024  Length of Stay: 6 days  Attending Physician: gA Reyes, *  Primary Care Provider: Dawson Granado MD        Subjective:     Principal Problem:Inguinal hernia of left side with gangrene        HPI:  Patient is a 44 year old male with history of ESDR on dialysis MWF, awaiting kidney transplant, HTN, presented to ED with 3 days history of left groin pain and swelling. States swelling comes every time he cough and is being painful. Patient has low grade fever 99s at home. He has been vomiting bilious fluid last 2-3 days. No diarrhea or abdominal pain.     In the ED CT abdomen showed Left inguinal hernia containing fat and air as well as marked inflammation extending from inside the pelvis, in the hernia and down into the left scrotum. This is consistent with an infected inguinal hernia, possible gangrene. WBC was 14, patient was tachycardic. General surgery was consulted and patient was admitted under hospitalist.     Overview/Hospital Course:  Patient is a 44 year old male with history of ESRD, was admitted with suspected inguinal hernia gangrene. General surgery was consulted and patient was taken to OR. Found to have colonic perforation due to mesh erosion with an abscess. Patient underwent sigmoid resection and drainage of the abscess. Patient was started on clindamycin, Vancomycin and cefepime. Nephrology was consulted for chronic dialysis. However patient's AVF was not functioning. General surgery consulted again and trialysis catheter being placed today. Patient is getting dialysis and transfusion during dialysis for low Hb 6.6 and being transferred to [facility] for vascular evaluation.  Patient does not want to be seen by Dr. Khoobehi from vascular surgery.  Attempting to transfer the patient to [facility] where AV  No fistula declotting procedure can be performed, but given his underlying ongoing conditions/and with primary surgeon at [facility], there recommended to hold off transfer at this time as it is not an emergency and he already has a Trialysis catheter that is functioning.  While on broad-spectrum coverage, patient is spiking fevers and worsening leukocytosis, id consulted, discontinued clindamycin, vancomycin and cefepime-added daptomycin, Diflucan and Zosyn.  Repeated CT abdomen and pelvis.    Interval History:  White count up trending, ID consulted, currently on daptomycin, Zosyn and micafungin.  Repeat CT shows postop changes versus abscess/phlegmonous changes.  Reconsulting surgery.      Review of Systems abdominal pain and distention improved, had 2 bowel movements last night   Objective:     Vital Signs (Most Recent):  Temp: 98.1 °F (36.7 °C) (09/23/24 1100)  Pulse: 82 (09/23/24 1100)  Resp: 18 (09/23/24 1100)  BP: (!) 161/75 (notified nurse) (09/23/24 1100)  SpO2: 97 % (09/23/24 1100) Vital Signs (24h Range):  Temp:  [97.8 °F (36.6 °C)-99.2 °F (37.3 °C)] 98.1 °F (36.7 °C)  Pulse:  [78-85] 82  Resp:  [17-18] 18  SpO2:  [95 %-100 %] 97 %  BP: (114-161)/(65-90) 161/75     Weight: 134 kg (295 lb 6.7 oz)  Body mass index is 37.93 kg/m².    Intake/Output Summary (Last 24 hours) at 9/23/2024 1404  Last data filed at 9/23/2024 0852  Gross per 24 hour   Intake 120 ml   Output --   Net 120 ml      Physical Exam  Vitals and nursing note reviewed.   Constitutional:       General: He is not in acute distress.     Appearance: He is obese. He is not toxic-appearing.   HENT:      Head: Atraumatic.      Mouth/Throat:      Mouth: Mucous membranes are moist.      Pharynx: Oropharynx is clear.   Eyes:      General: No scleral icterus.     Conjunctiva/sclera: Conjunctivae normal.      Pupils: Pupils are equal, round, and reactive to light.   Cardiovascular:      Rate and Rhythm: Regular rhythm.      Heart sounds: No murmur  heard.  Pulmonary:      Effort: No respiratory distress.      Breath sounds: No wheezing, rhonchi or rales.   Abdominal:      General: Abdomen is flat. Bowel sounds are normal.  Abdominal tenderness much improved, mild distention present, no guarding, no rigidity.     Palpations: Abdomen is soft.   Genitourinary:     Comments: There is left groin dressing and also laparoscopic scars in the abdomen   Musculoskeletal:         General: No swelling or deformity.      Cervical back: No rigidity or tenderness.   Left upper extremity with fistula in a bandage  Skin:     Coloration: Skin is not jaundiced or pale.      Findings: No bruising, erythema or rash.   Neurological:      General: No focal deficit present.      Mental Status: He is alert and oriented to person, place, and time.      Cranial Nerves: No cranial nerve deficit.      Sensory: No sensory deficit.      Motor: No weakness.   Psychiatric:         Mood and Affect: Mood normal.         Behavior: Behavior normal    Significant Labs: All pertinent labs within the past 24 hours have been reviewed.  CBC:   Recent Labs   Lab 09/22/24  0743 09/23/24  0918   WBC 16.59* 17.64*   HGB 7.6* 8.0*   HCT 24.2* 25.4*    357     CMP:   Recent Labs   Lab 09/22/24  0743 09/23/24  0918   * 132*   K 4.9 4.0   CL 97 93*   CO2 21* 22*    120*   BUN 54* 61*   CREATININE 14.5* 16.0*   CALCIUM 8.7 9.1   PROT 7.5 7.5   ALBUMIN 3.2* 3.2*   BILITOT 0.4 0.5   ALKPHOS 87 114   AST 33 46*   ALT 13 26   ANIONGAP 16 17*       Significant Imaging:     CT abdomen and pelvis without contrast:  Left inguinal hernia containing fat and air as well as marked inflammation extending from inside the pelvis, in the hernia and down into the left scrotum.  This is consistent with an infected inguinal hernia, possible gangrene.  Diverticulosis coli without CT evidence of diverticulitis.  2.4 cm cyst of the left kidney.  Small kidneys noted    CXR: No acute abnormality.     CXR:  Central  line inserted ending in the superior vena cava. Hypoventilation.     Physical Exam    Assessment/Plan:      * Inguinal hernia of left side with gangrene  Colonic perforation with abscess  CT abdomen shows Left inguinal hernia containing fat and air as well as marked inflammation extending from inside the pelvis, in the hernia and down into the left scrotum. This is consistent with an infected inguinal hernia, possible gangrene.   Patient was taken to OR and found to have colonic perforation due to mesh erosion. Patient underwent sigmoid resection and I&D on 9/17    - diet per general surgery   - repeat episodes of fever   -worsening leukocytosis  -mild abdominal distention, abdominal pain-KUB does not show any signs of ileus/SBO  - blood cultures and OR culture no growth till date  - consulted ID   -recommended to discontinue vancomycin, clindamycin, cefepime   -started on daptomycin, Diflucan and Zosyn  -repeated CT abdomen and pelvis , showing postoperative versus abscess/phlegmonmaterial versus contained perforation  -reconsulted surgery 9/23, mentioned expected postoperative changes      Dialysis AV fistula malfunction  Was unable to access AVF on 9/18  Trialysis catheter placed on 9/19  Vascular surgery evaluation for declotting of AV fistula.  Reconsult vascular surgery team in-house 9/23, holding of transferred to [facility] with his worsening underlying infection, and day clotting being a nonemergent procedure when he already has a functioning dialysis catheter-as per vascular surgery at [facility]  Consulted Dr. Salvador      Left-sided hydrocele/moderate scrotal edema:  -obtain ultrasound  -continue antibiotics as above  -we will consult Urology      Essential hypertension  Chronic, controlled. Latest blood pressure and vitals reviewed-     Home meds for hypertension were reviewed and noted below.   Hypertension Medications               cloNIDine (CATAPRES) 0.3 MG tablet TAKE 1 TABLET BY MOUTH  THREE TIMES DAILY    hydrALAZINE (APRESOLINE) 100 MG tablet Take 1 tablet (100 mg total) by mouth 3 (three) times daily.    isosorbide mononitrate (IMDUR) 120 MG 24 hr tablet Take 120 mg by mouth once daily.    metoprolol succinate (TOPROL-XL) 50 MG 24 hr tablet Take 150 mg by mouth once daily.    olmesartan (BENICAR) 40 MG tablet Take 1 tablet by mouth once daily            While in the hospital, will manage blood pressure as follows; Continue home antihypertensive regimen    Will utilize p.r.n. blood pressure medication only if patient's blood pressure greater than 140/90 and he develops symptoms such as worsening chest pain or shortness of breath.    Sleep apnea  CPAP per home settings       Pulmonary hypertension  Resume home meds       Anemia of chronic disease  Anemia is likely due to chronic disease due to ESRD. Most recent hemoglobin and hematocrit are listed below.  Recent Labs     09/21/24  0558 09/22/24  0743 09/23/24  0918   HGB 7.6* 7.6* 8.0*   HCT 24.4* 24.2* 25.4*       Plan  - Monitor serial CBC: Daily  - s/p 2 unit of pRBC on 9/19/24 and 09/20/24    ESRD (end stage renal disease)  Nephrology consulted for routine dialysis MWF, AVF is not functional and tiralysis catheter was placed yesterday.    Plan for AV fistula declotting.  Consulted vascular surgery a [facility], given the patient's acuity of condition, and declotting being non-emergency, they recommended to reach out to vascular surgery team at [facility] on week days starting tomorrow.  Reconsulted vascular surgery Dr. Salvador   Renal dose medications   On HD      VTE Risk Mitigation (From admission, onward)           Ordered     heparin (porcine) injection 4,000 Units  As needed (PRN)         09/20/24 1544     heparin (porcine) injection 7,500 Units  Every 8 hours         09/17/24 1031     IP VTE HIGH RISK PATIENT  Once         09/17/24 1031     Place sequential compression device  Until discontinued         09/17/24 1031                     Discharge Planning   MARIA ISABEL:      Code Status: Full Code   Is the patient medically ready for discharge?:     Reason for patient still in hospital (select all that apply): Treatment, Imaging, and Consult recommendations  Discharge Plan A: Hospital Transfer                  Ag Reyes MD  Department of Hospital Medicine   [facility]

## 2024-09-23 NOTE — CARE UPDATE
09/23/24 0015   PRE-TX-O2   Device (Oxygen Therapy) BIPAP   Oxygen Concentration (%) 40   SpO2 100 %   Ready to Wean/Extubation Screen   FIO2<=50 (chart decimal) 0.4   Preset CPAP/BiPAP Settings   Mode Of Delivery BiPAP   $ CPAP/BiPAP Daily Charge BiPAP/CPAP Daily   $ Initial CPAP/BiPAP Setup? No   $ Is patient using? Yes   Size of Mask Large   Sized Appropriately? Yes   Equipment Type V60   Ipap 10   EPAP (cm H2O) 5   Pressure Support (cm H2O) 5   Set Rate (Breaths/Min) 12   ITime (sec) 1   Rise Time (sec) 3   Patient CPAP/BiPAP Settings   RR Total (Breaths/Min) 20   Tidal Volume (mL) 758   VE Minute Ventilation (L/min) 14.6 L/min   Peak Inspiratory Pressure (cm H2O) 10   TiTOT (%) 36   Total Leak (L/Min) 28   Patient Trigger - ST Mode Only (%) 89

## 2024-09-23 NOTE — ASSESSMENT & PLAN NOTE
Was unable to access AVF on 9/18  Trialysis catheter placed on 9/19  Vascular surgery evaluation for declotting of AV fistula.  Reconsult vascular surgery team in-house 9/23, holding of transferred to [facility] with his worsening underlying infection, and day clotting being a nonemergent procedure when he already has a functioning dialysis catheter-as per vascular surgery at [facility]  Consulted Dr. Salvador

## 2024-09-23 NOTE — PROGRESS NOTES
09/23/24 1400   Handoff Report   Received From Jose MENA RN   Given To Lake BAPTISTE RN     Pt's LT IJ cath appears to be slightly pulled out and curved under the Tegaderm dsg, no return on the art port and only sluggish return from the maximilian port when aspirated, both flushed with med amt of pressure.  notified.  Order given to [facility] HD tx for now,  send pt back to his room and to order a CXR to confirm placement.

## 2024-09-23 NOTE — ASSESSMENT & PLAN NOTE
Anemia is likely due to chronic disease due to ESRD. Most recent hemoglobin and hematocrit are listed below.  Recent Labs     09/21/24  0558 09/22/24  0743 09/23/24  0918   HGB 7.6* 7.6* 8.0*   HCT 24.4* 24.2* 25.4*       Plan  - Monitor serial CBC: Daily  - s/p 2 unit of pRBC on 9/19/24 and 09/20/24

## 2024-09-23 NOTE — PLAN OF CARE
09/23/24 0714   Patient Assessment/Suction   Level of Consciousness (AVPU) alert   Respiratory Effort Normal;Unlabored   Expansion/Accessory Muscles/Retractions no use of accessory muscles   All Lung Fields Breath Sounds Anterior:;Lateral:;diminished;clear   Rhythm/Pattern, Respiratory pattern regular   Cough Frequency no cough   PRE-TX-O2   Device (Oxygen Therapy) room air   SpO2 99 %   Pulse Oximetry Type Intermittent   $ Pulse Oximetry - Multiple Charge Pulse Oximetry - Multiple   Pulse 80   Resp 17   General Safety Checklist   Safety Promotion/Fall Prevention side rails raised   Preset CPAP/BiPAP Settings   Mode Of Delivery BiPAP S/T;Standby   $ CPAP/BiPAP Daily Charge BiPAP/CPAP Daily   $ Initial CPAP/BiPAP Setup? No   $ Is patient using? Other (see comments)  (QHS)   Equipment Type V60   Education   $ Education BiPAP;Oxygen;15 min

## 2024-09-23 NOTE — ASSESSMENT & PLAN NOTE
Nephrology consulted for routine dialysis MWF, AVF is not functional and tiralysis catheter was placed yesterday.    Plan for AV fistula declotting.  Consulted vascular surgery a [facility], given the patient's acuity of condition, and declotting being non-emergency, they recommended to reach out to vascular surgery team at [facility] on week days starting tomorrow.  Reconsulted vascular surgery Dr. Salvador   Renal dose medications   On HD

## 2024-09-23 NOTE — H&P (VIEW-ONLY)
[facility]  Vascular Surgery  Consult Note    Inpatient consult to Vascular Surgery  Consult performed by: Lorraine Salvador MD  Consult ordered by: Ag Reyes MD      Inpatient consult to Vascular Surgery  Consult performed by: Lorraine Salvador MD  Consult ordered by: Oni Garcia MD        Subjective:       History of Present Illness: Patient is a 44 year old male with history of ESRD on dialysis MWF, awaiting kidney transplant, HTN, presented to ED with 3 days history of left groin pain and swelling. Workup showed left inguinal hernia containing fat and air. General surgery was consulted and patient was taken to OR. Found to have colonic perforation due to mesh erosion with an abscess. Patient underwent sigmoid resection and drainage of the abscess on 9/20/24. Patient reports his last HD through fistula was on 9/16/24. He states that when they attempted to use the fistula in the hospital the needle was sticking out. They called his usual dialysis nurse who said the needle can stick out a little, but they were not comfortable doing that here. A trialysis catheter was placed in the hospital. The patient has history of angioplasty of the fistula. It is a left radiocephalic fistula. He states that the thrill cannot be felt close to the wrist.     Medications Prior to Admission   Medication Sig Dispense Refill Last Dose    apixaban (ELIQUIS) 2.5 mg Tab Take 2.5 mg by mouth 2 (two) times daily.   9/17/2024 at 06:00    calcitRIOL (ROCALTROL) 0.25 MCG Cap Take 1 capsule by mouth once daily (Patient taking differently: Take 0.25 mcg by mouth once daily.) 90 capsule 1 9/16/2024    cholecalciferol, vitamin D3, 125 mcg (5,000 unit) Tab Take 1 tablet by mouth once daily.   9/17/2024    cloNIDine (CATAPRES) 0.3 MG tablet TAKE 1 TABLET BY MOUTH THREE TIMES DAILY 270 tablet 2 9/17/2024 at 06:00    isosorbide mononitrate (IMDUR) 120 MG 24 hr tablet Take 120 mg by mouth once daily.   9/17/2024 at  06:00    metoprolol succinate (TOPROL-XL) 50 MG 24 hr tablet Take 150 mg by mouth once daily.   9/17/2024 at 06:00    olmesartan (BENICAR) 40 MG tablet Take 1 tablet by mouth once daily 90 tablet 0 9/17/2024 at 06:00    sevelamer carbonate (RENVELA) 800 mg Tab Take 2 tablets (1,600 mg total) by mouth 3 (three) times daily with meals. 180 tablet 11 9/17/2024    calcium carbonate (TUMS) 200 mg calcium (500 mg) chewable tablet Take 2 tablets (1,000 mg total) by mouth 3 (three) times daily with meals. (Patient taking differently: Take 1,000 mg by mouth 3 (three) times daily.) 180 tablet 11     hydrALAZINE (APRESOLINE) 100 MG tablet Take 1 tablet (100 mg total) by mouth 3 (three) times daily. 90 tablet 11        Review of patient's allergies indicates:   Allergen Reactions    Mushroom Swelling     Tongue swells    Tongue swells       Tongue swells       Past Medical History:   Diagnosis Date    Allergy     Anemia     Anxiety     CHF (congestive heart failure)     Depression     High blood pressure with chronic kidney disease, stage 5 chronic kidney disease or end stage renal disease     Hypertension      Past Surgical History:   Procedure Laterality Date    ABSCESS DRAINAGE Left 9/17/2024    Procedure: DRAINAGE, ABSCESS, GROIN;  Surgeon: Galileo Connors MD;  Location: [facility];  Service: General;  Laterality: Left;    FISTULOGRAM Bilateral 4/30/2024    Procedure: Fistulogram;  Surgeon: Khoobehi, Ali, MD;  Location: [facility] CATH/EP LAB;  Service: Vascular;  Laterality: Bilateral;    HERNIA REPAIR Right     With mesh    INSERTION, CATHETER, TUNNELED N/A 6/22/2023    Procedure: Insertion,catheter,tunneled;  Surgeon: Everett Caicedo MD;  Location: [facility] OR;  Service: General;  Laterality: N/A;    PERCUTANEOUS TRANSLUMINAL ANGIOPLASTY OF ARTERIOVENOUS FISTULA Left 4/30/2024    Procedure: PTA, AV FISTULA;  Surgeon: Khoobehi, Ali, MD;  Location: [facility] CATH/EP LAB;  Service: Vascular;  Laterality: Left;    PHLEBOGRAPHY N/A  7/19/2024    Procedure: Venogram;  Surgeon: Khoobehi, Ali, MD;  Location: [facility] CATH/EP LAB;  Service: Vascular;  Laterality: N/A;    REMOVAL, TUNNELED CATH Right 7/19/2024    Procedure: REMOVAL, TUNNELED CATH;  Surgeon: Khoobehi, Ali, MD;  Location: [facility] CATH/EP LAB;  Service: Vascular;  Laterality: Right;    ROBOT-ASSISTED LAPAROSCOPIC RESECTION OF SIGMOID COLON USING DA DELANO XI N/A 9/17/2024    Procedure: XI ROBOTIC SIGMOID RESECTION, WITH ANASTAMOSIS;  Surgeon: Galileo Connors MD;  Location: [facility] OR;  Service: General;  Laterality: N/A;  possible open have instruments available     Family History    None       Tobacco Use    Smoking status: Never     Passive exposure: Never    Smokeless tobacco: Never   Substance and Sexual Activity    Alcohol use: Never    Drug use: Never    Sexual activity: Not Currently     Review of Systems  Objective:     Vital Signs (Most Recent):  Temp: 98.8 °F (37.1 °C) (09/23/24 1600)  Pulse: 72 (09/23/24 1600)  Resp: 18 (09/23/24 1754)  BP: 119/75 (09/23/24 1600)  SpO2: 97 % (09/23/24 1600) Vital Signs (24h Range):  Temp:  [98.1 °F (36.7 °C)-99.2 °F (37.3 °C)] 98.8 °F (37.1 °C)  Pulse:  [72-84] 72  Resp:  [17-18] 18  SpO2:  [95 %-100 %] 97 %  BP: (119-161)/(75-90) 119/75     Weight: 134 kg (295 lb 6.7 oz)  Body mass index is 37.93 kg/m².    Date 09/23/24 0700 - 09/24/24 0659   Shift 3578-8937 9000-6464 4760-8220 24 Hour Total   INTAKE   P.O. 120   120   Shift Total(mL/kg) 120(0.9)   120(0.9)   OUTPUT   Shift Total(mL/kg)       Weight (kg) 134 134 134 134       Physical Exam  Constitutional:       Appearance: He is obese.   Cardiovascular:      Rate and Rhythm: Normal rate and regular rhythm.   Musculoskeletal:      Comments: Left forearm fistula  Palpable radial pulse  Strong thrill in fistula, no pulsatility appreciated  Small aneurysmal area with no overlying skin changes  No thrill felt for approximately 2 inches above the wrist, patient states it has always been this way    Skin:     General: Skin is warm and dry.   Neurological:      Mental Status: He is alert.         Assessment/Plan:     44M with malfunctioning fistula.   - Fistulogram tomorrow  - NPO at midnight        Active Diagnoses:    Diagnosis Date Noted POA    PRINCIPAL PROBLEM:  Inguinal hernia of left side with gangrene [K40.40] 09/17/2024 Yes    Dialysis AV fistula malfunction [T82.590A] 09/19/2024 No    Essential hypertension [I10] 07/17/2024 Yes    Sleep apnea [G47.30] 03/31/2023 Yes    Pulmonary hypertension [I27.20] 11/29/2022 Yes    ESRD (end stage renal disease) [N18.6] 10/10/2022 Yes    Anemia of chronic disease [D63.8] 10/10/2022 Yes      Problems Resolved During this Admission:       Thank you for your consult. I will follow-up with patient. Please contact us if you have any additional questions..    Lorraine Salvador MD  Vascular Surgery  [facility]

## 2024-09-23 NOTE — ASSESSMENT & PLAN NOTE
Colonic perforation with abscess  CT abdomen shows Left inguinal hernia containing fat and air as well as marked inflammation extending from inside the pelvis, in the hernia and down into the left scrotum. This is consistent with an infected inguinal hernia, possible gangrene.   Patient was taken to OR and found to have colonic perforation due to mesh erosion. Patient underwent sigmoid resection and I&D on 9/17    - diet per general surgery   - repeat episodes of fever   -worsening leukocytosis  -mild abdominal distention, abdominal pain-KUB does not show any signs of ileus/SBO  - blood cultures and OR culture no growth till date  - consulted ID   -recommended to discontinue vancomycin, clindamycin, cefepime   -started on daptomycin, Diflucan and Zosyn  -repeated CT abdomen and pelvis , showing postoperative versus abscess/ligamentous material versus contained perforation  -reconsulted surgery 9/23, mentioned expected postoperative changes

## 2024-09-24 LAB
ALBUMIN SERPL BCP-MCNC: 3 G/DL (ref 3.5–5.2)
ALP SERPL-CCNC: 113 U/L (ref 55–135)
ALT SERPL W/O P-5'-P-CCNC: 31 U/L (ref 10–44)
ANION GAP SERPL CALC-SCNC: 21 MMOL/L (ref 8–16)
ANISOCYTOSIS BLD QL SMEAR: SLIGHT
AST SERPL-CCNC: 37 U/L (ref 10–40)
BASOPHILS NFR BLD: 0 % (ref 0–1.9)
BILIRUB SERPL-MCNC: 0.5 MG/DL (ref 0.1–1)
BUN SERPL-MCNC: 75 MG/DL (ref 6–20)
CALCIUM SERPL-MCNC: 8.8 MG/DL (ref 8.7–10.5)
CHLORIDE SERPL-SCNC: 93 MMOL/L (ref 95–110)
CO2 SERPL-SCNC: 22 MMOL/L (ref 23–29)
CREAT SERPL-MCNC: 17.7 MG/DL (ref 0.5–1.4)
CRP SERPL-MCNC: 35.7 MG/DL
DIFFERENTIAL METHOD BLD: ABNORMAL
EOSINOPHIL NFR BLD: 0 % (ref 0–8)
ERYTHROCYTE [DISTWIDTH] IN BLOOD BY AUTOMATED COUNT: 20.4 % (ref 11.5–14.5)
EST. GFR  (NO RACE VARIABLE): 3 ML/MIN/1.73 M^2
GLUCOSE SERPL-MCNC: 101 MG/DL (ref 70–110)
GRAM STN SPEC: NORMAL
GRAM STN SPEC: NORMAL
HCT VFR BLD AUTO: 22.6 % (ref 40–54)
HGB BLD-MCNC: 7.1 G/DL (ref 14–18)
IMM GRANULOCYTES # BLD AUTO: ABNORMAL K/UL (ref 0–0.04)
IMM GRANULOCYTES NFR BLD AUTO: ABNORMAL % (ref 0–0.5)
LACTATE SERPL-SCNC: 0.6 MMOL/L (ref 0.5–1.9)
LYMPHOCYTES NFR BLD: 5 % (ref 18–48)
MAGNESIUM SERPL-MCNC: 2.3 MG/DL (ref 1.6–2.6)
MCH RBC QN AUTO: 31 PG (ref 27–31)
MCHC RBC AUTO-ENTMCNC: 31.4 G/DL (ref 32–36)
MCV RBC AUTO: 99 FL (ref 82–98)
METAMYELOCYTES NFR BLD MANUAL: 1 %
MONOCYTES NFR BLD: 12 % (ref 4–15)
MYELOCYTES NFR BLD MANUAL: 2 %
NEUTROPHILS NFR BLD: 70 % (ref 38–73)
NEUTS BAND NFR BLD MANUAL: 10 %
NRBC BLD-RTO: 0 /100 WBC
PLATELET # BLD AUTO: 384 K/UL (ref 150–450)
PLATELET BLD QL SMEAR: ABNORMAL
PMV BLD AUTO: 10.3 FL (ref 9.2–12.9)
POCT GLUCOSE: 101 MG/DL (ref 70–110)
POCT GLUCOSE: 132 MG/DL (ref 70–110)
POCT GLUCOSE: 197 MG/DL (ref 70–110)
POTASSIUM SERPL-SCNC: 4.2 MMOL/L (ref 3.5–5.1)
PROCALCITONIN SERPL IA-MCNC: 13.93 NG/ML (ref 0–0.5)
PROT SERPL-MCNC: 7.3 G/DL (ref 6–8.4)
RBC # BLD AUTO: 2.29 M/UL (ref 4.6–6.2)
SODIUM SERPL-SCNC: 136 MMOL/L (ref 136–145)
WBC # BLD AUTO: 18.77 K/UL (ref 3.9–12.7)

## 2024-09-24 PROCEDURE — 90935 HEMODIALYSIS ONE EVALUATION: CPT

## 2024-09-24 PROCEDURE — 057F3Z1 DILATION OF LEFT CEPHALIC VEIN USING DRUG-COATED BALLOON, PERCUTANEOUS APPROACH: ICD-10-PCS | Performed by: STUDENT IN AN ORGANIZED HEALTH CARE EDUCATION/TRAINING PROGRAM

## 2024-09-24 PROCEDURE — 63600175 PHARM REV CODE 636 W HCPCS: Performed by: STUDENT IN AN ORGANIZED HEALTH CARE EDUCATION/TRAINING PROGRAM

## 2024-09-24 PROCEDURE — 87186 SC STD MICRODIL/AGAR DIL: CPT | Performed by: STUDENT IN AN ORGANIZED HEALTH CARE EDUCATION/TRAINING PROGRAM

## 2024-09-24 PROCEDURE — 63600175 PHARM REV CODE 636 W HCPCS: Performed by: INTERNAL MEDICINE

## 2024-09-24 PROCEDURE — 87205 SMEAR GRAM STAIN: CPT | Performed by: STUDENT IN AN ORGANIZED HEALTH CARE EDUCATION/TRAINING PROGRAM

## 2024-09-24 PROCEDURE — 36902 INTRO CATH DIALYSIS CIRCUIT: CPT | Mod: LT | Performed by: STUDENT IN AN ORGANIZED HEALTH CARE EDUCATION/TRAINING PROGRAM

## 2024-09-24 PROCEDURE — C1769 GUIDE WIRE: HCPCS | Performed by: STUDENT IN AN ORGANIZED HEALTH CARE EDUCATION/TRAINING PROGRAM

## 2024-09-24 PROCEDURE — 83735 ASSAY OF MAGNESIUM: CPT | Performed by: INTERNAL MEDICINE

## 2024-09-24 PROCEDURE — 83605 ASSAY OF LACTIC ACID: CPT

## 2024-09-24 PROCEDURE — 25000003 PHARM REV CODE 250: Performed by: STUDENT IN AN ORGANIZED HEALTH CARE EDUCATION/TRAINING PROGRAM

## 2024-09-24 PROCEDURE — C1725 CATH, TRANSLUMIN NON-LASER: HCPCS | Performed by: STUDENT IN AN ORGANIZED HEALTH CARE EDUCATION/TRAINING PROGRAM

## 2024-09-24 PROCEDURE — 99152 MOD SED SAME PHYS/QHP 5/>YRS: CPT | Performed by: STUDENT IN AN ORGANIZED HEALTH CARE EDUCATION/TRAINING PROGRAM

## 2024-09-24 PROCEDURE — B51W1ZZ FLUOROSCOPY OF DIALYSIS SHUNT/FISTULA USING LOW OSMOLAR CONTRAST: ICD-10-PCS | Performed by: STUDENT IN AN ORGANIZED HEALTH CARE EDUCATION/TRAINING PROGRAM

## 2024-09-24 PROCEDURE — 27100171 HC OXYGEN HIGH FLOW UP TO 24 HOURS

## 2024-09-24 PROCEDURE — 99153 MOD SED SAME PHYS/QHP EA: CPT | Performed by: STUDENT IN AN ORGANIZED HEALTH CARE EDUCATION/TRAINING PROGRAM

## 2024-09-24 PROCEDURE — 84145 PROCALCITONIN (PCT): CPT | Performed by: INTERNAL MEDICINE

## 2024-09-24 PROCEDURE — 99231 SBSQ HOSP IP/OBS SF/LOW 25: CPT | Mod: ,,, | Performed by: FAMILY MEDICINE

## 2024-09-24 PROCEDURE — 87040 BLOOD CULTURE FOR BACTERIA: CPT

## 2024-09-24 PROCEDURE — 80053 COMPREHEN METABOLIC PANEL: CPT | Performed by: INTERNAL MEDICINE

## 2024-09-24 PROCEDURE — 25000003 PHARM REV CODE 250: Performed by: INTERNAL MEDICINE

## 2024-09-24 PROCEDURE — 87040 BLOOD CULTURE FOR BACTERIA: CPT | Mod: 59

## 2024-09-24 PROCEDURE — 36415 COLL VENOUS BLD VENIPUNCTURE: CPT | Performed by: INTERNAL MEDICINE

## 2024-09-24 PROCEDURE — 94761 N-INVAS EAR/PLS OXIMETRY MLT: CPT

## 2024-09-24 PROCEDURE — 99233 SBSQ HOSP IP/OBS HIGH 50: CPT | Mod: ,,, | Performed by: INTERNAL MEDICINE

## 2024-09-24 PROCEDURE — 85027 COMPLETE CBC AUTOMATED: CPT | Performed by: INTERNAL MEDICINE

## 2024-09-24 PROCEDURE — 86140 C-REACTIVE PROTEIN: CPT | Performed by: INTERNAL MEDICINE

## 2024-09-24 PROCEDURE — 99900031 HC PATIENT EDUCATION (STAT)

## 2024-09-24 PROCEDURE — 99900035 HC TECH TIME PER 15 MIN (STAT)

## 2024-09-24 PROCEDURE — 87070 CULTURE OTHR SPECIMN AEROBIC: CPT | Performed by: STUDENT IN AN ORGANIZED HEALTH CARE EDUCATION/TRAINING PROGRAM

## 2024-09-24 PROCEDURE — 12000002 HC ACUTE/MED SURGE SEMI-PRIVATE ROOM

## 2024-09-24 PROCEDURE — 99223 1ST HOSP IP/OBS HIGH 75: CPT | Mod: ,,, | Performed by: UROLOGY

## 2024-09-24 PROCEDURE — 94660 CPAP INITIATION&MGMT: CPT

## 2024-09-24 PROCEDURE — 27000221 HC OXYGEN, UP TO 24 HOURS

## 2024-09-24 PROCEDURE — 36415 COLL VENOUS BLD VENIPUNCTURE: CPT

## 2024-09-24 PROCEDURE — C1894 INTRO/SHEATH, NON-LASER: HCPCS | Performed by: STUDENT IN AN ORGANIZED HEALTH CARE EDUCATION/TRAINING PROGRAM

## 2024-09-24 PROCEDURE — 85007 BL SMEAR W/DIFF WBC COUNT: CPT | Performed by: INTERNAL MEDICINE

## 2024-09-24 RX ORDER — LIDOCAINE HYDROCHLORIDE 10 MG/ML
INJECTION, SOLUTION EPIDURAL; INFILTRATION; INTRACAUDAL; PERINEURAL
Status: DISCONTINUED | OUTPATIENT
Start: 2024-09-24 | End: 2024-09-24 | Stop reason: HOSPADM

## 2024-09-24 RX ORDER — MIDAZOLAM HYDROCHLORIDE 1 MG/ML
INJECTION INTRAMUSCULAR; INTRAVENOUS
Status: DISCONTINUED | OUTPATIENT
Start: 2024-09-24 | End: 2024-09-24 | Stop reason: HOSPADM

## 2024-09-24 RX ORDER — FENTANYL CITRATE 50 UG/ML
INJECTION, SOLUTION INTRAMUSCULAR; INTRAVENOUS
Status: DISCONTINUED | OUTPATIENT
Start: 2024-09-24 | End: 2024-09-24 | Stop reason: HOSPADM

## 2024-09-24 RX ADMIN — GABAPENTIN 200 MG: 100 CAPSULE ORAL at 08:09

## 2024-09-24 RX ADMIN — SODIUM ZIRCONIUM CYCLOSILICATE 10 G: 10 POWDER, FOR SUSPENSION ORAL at 08:09

## 2024-09-24 RX ADMIN — BISACODYL 5 MG: 5 TABLET, COATED ORAL at 08:09

## 2024-09-24 RX ADMIN — HYDROMORPHONE HYDROCHLORIDE 1 MG: 1 INJECTION, SOLUTION INTRAMUSCULAR; INTRAVENOUS; SUBCUTANEOUS at 08:09

## 2024-09-24 RX ADMIN — PIPERACILLIN SODIUM AND TAZOBACTAM SODIUM 2.25 G: 2; .25 INJECTION, POWDER, LYOPHILIZED, FOR SOLUTION INTRAVENOUS at 09:09

## 2024-09-24 RX ADMIN — PIPERACILLIN SODIUM AND TAZOBACTAM SODIUM 2.25 G: 2; .25 INJECTION, POWDER, LYOPHILIZED, FOR SOLUTION INTRAVENOUS at 12:09

## 2024-09-24 RX ADMIN — FLUCONAZOLE 200 MG: 2 INJECTION, SOLUTION INTRAVENOUS at 08:09

## 2024-09-24 RX ADMIN — CLONIDINE HYDROCHLORIDE 0.3 MG: 0.1 TABLET ORAL at 08:09

## 2024-09-24 RX ADMIN — HYDRALAZINE HYDROCHLORIDE 100 MG: 25 TABLET ORAL at 07:09

## 2024-09-24 RX ADMIN — HEPARIN SODIUM 7500 UNITS: 5000 INJECTION INTRAVENOUS; SUBCUTANEOUS at 09:09

## 2024-09-24 RX ADMIN — PIPERACILLIN SODIUM AND TAZOBACTAM SODIUM 4.5 G: 4; .5 INJECTION, POWDER, LYOPHILIZED, FOR SOLUTION INTRAVENOUS at 08:09

## 2024-09-24 RX ADMIN — HEPARIN SODIUM 7500 UNITS: 5000 INJECTION INTRAVENOUS; SUBCUTANEOUS at 05:09

## 2024-09-24 RX ADMIN — HYDROCODONE BITARTRATE AND ACETAMINOPHEN 1 TABLET: 5; 325 TABLET ORAL at 06:09

## 2024-09-24 NOTE — NURSING
Lab until to obtain lab work, notified nancy/ultrasound and was able to draw labs and ONE set of blood cultures. Per QR code verified with Dr Reyes to draw blood cultures not wound cultures. Per Dr Salvador  after reviewing xray okay to draw labs from trialysis catheter. Attempted to draw labs from purple port of trialysis catheter and was able to flush line with no resistance but there was no blood return.

## 2024-09-24 NOTE — CARE UPDATE
09/23/24 2036   Patient Assessment/Suction   Level of Consciousness (AVPU) alert   Respiratory Effort Normal;Unlabored   Expansion/Accessory Muscles/Retractions no use of accessory muscles;no retractions   Rhythm/Pattern, Respiratory unlabored;pattern regular;no shortness of breath reported   PRE-TX-O2   Device (Oxygen Therapy) nasal cannula   $ Is the patient on Low Flow Oxygen? Yes   Flow (L/min) (Oxygen Therapy) 2   SpO2 97 %   Pulse Oximetry Type Intermittent   $ Pulse Oximetry - Multiple Charge Pulse Oximetry - Multiple

## 2024-09-24 NOTE — PROGRESS NOTES
INPATIENT NEPHROLOGY [place]  [facility] NEPHROLOGY    João Ham  09/24/2024    Reason for consultation:  esrd    Chief Complaint:   Chief Complaint   Patient presents with    Fatigue    Groin Swelling     X 2 days.  Hx of Kidney failure and CHF     History of Present Illness:     Per H and P    Patient is a 44 year old male with history of ESDR on dialysis MWF, awaiting kidney transplant, HTN, presented to ED with 3 days history of left groin pain and swelling. States swelling comes every time he cough and is being painful. Patient has low grade fever 99s at home. He has been vomiting bilious fluid last 2-3 days. No diarrhea or abdominal pain.      In the ED CT abdomen showed Left inguinal hernia containing fat and air as well as marked inflammation extending from inside the pelvis, in the hernia and down into the left scrotum. This is consistent with an infected inguinal hernia, possible gangrene. WBC was 14, patient was tachycardic. General surgery was consulted and patient was admitted under hospitalist.     9/19  avf nonfunctional.   No sob or chest pain.  Has post op pain.  AFVSS  9/20  afvss.  Pt doesn't want to see previous vascular surgeon who put his avf in.  No alternative available.  Transfer center called.  Has temporary trialysis catheter.  Patient seen on hemodialysis for uremic clearance and ultrafiltration.    9/21  2.5L off w/HD yesterday.  Tmax 101.8, pressures ok.  Lab results reviewed, Hgb low but better than yesterday.  C/o post op pain, no other complaints.  9/22 POD 5.  VSS, lab results reviewed.  Hgb 7.6, added epo to HD orders.  C/o gas pains, plans to move around more today.  Next HD tomorrow.  9/23 VSS. HD today. Transfuse with next HD as needed if Hgb stil low.  9/24 VSS. Appreciate input from Urology, Gen Surgery, Vascular Surgery, ID on this complex patient. Plan for fistulogram tomorrow. CXR yesterday shows trialysis line tip in appropriate cocation. Will attempt HD today since we  skipped yesterday due to nurse and patient concerns that the line may be malpositioned.    Plan of Care:    ESRD on HD MWF  --continue dialysis per routine  --fluid restrict  --renal dose medication per routine  --continue outpt medication  --continue binders with meals    Anemia of CKD  --erythropoiesis stimulating agent with renal replacement therapy    Secondary HPT  --renal diet  --continue binders    Hypertension  --uf with hd  --fluid restrict  --low salt diet  --continue home medication    Hyperkalemia  --2k dialysate  --low k diet    AVF malfunction  --got temporary line, CXR on 9/23 confirms tip in typical location, unchanged.  --appreciate Vascular eval, plan fistulogram.      Thank you for allowing us to participate in this patient's care. We will continue to follow.    Vital Signs:  Temp Readings from Last 3 Encounters:   09/24/24 98.6 °F (37 °C) (Oral)   08/22/24 97.3 °F (36.3 °C) (Temporal)   07/30/24 98.6 °F (37 °C) (Oral)       Pulse Readings from Last 3 Encounters:   09/24/24 74   08/29/24 (!) 115   08/22/24 110       BP Readings from Last 3 Encounters:   09/24/24 135/78   08/29/24 99/68   08/22/24 (!) 137/91       Weight:  Wt Readings from Last 3 Encounters:   09/18/24 134 kg (295 lb 6.7 oz)   08/29/24 131.3 kg (289 lb 7.4 oz)   08/22/24 131 kg (288 lb 12.8 oz)       Past Medical & Surgical History:  Past Medical History:   Diagnosis Date    Allergy     Anemia     Anxiety     CHF (congestive heart failure)     Depression     High blood pressure with chronic kidney disease, stage 5 chronic kidney disease or end stage renal disease     Hypertension        Past Surgical History:   Procedure Laterality Date    ABSCESS DRAINAGE Left 9/17/2024    Procedure: DRAINAGE, ABSCESS, GROIN;  Surgeon: Galileo Connors MD;  Location: [facility] OR;  Service: General;  Laterality: Left;    FISTULOGRAM Bilateral 4/30/2024    Procedure: Fistulogram;  Surgeon: Khoobehi, Ali, MD;  Location: [facility] CATH/EP LAB;  Service:  Vascular;  Laterality: Bilateral;    HERNIA REPAIR Right     With mesh    INSERTION, CATHETER, TUNNELED N/A 6/22/2023    Procedure: Insertion,catheter,tunneled;  Surgeon: Everett Caicedo MD;  Location: [facility] OR;  Service: General;  Laterality: N/A;    PERCUTANEOUS TRANSLUMINAL ANGIOPLASTY OF ARTERIOVENOUS FISTULA Left 4/30/2024    Procedure: PTA, AV FISTULA;  Surgeon: Khoobehi, Ali, MD;  Location: [facility] CATH/EP LAB;  Service: Vascular;  Laterality: Left;    PHLEBOGRAPHY N/A 7/19/2024    Procedure: Venogram;  Surgeon: Khoobehi, Ali, MD;  Location: [facility] CATH/EP LAB;  Service: Vascular;  Laterality: N/A;    REMOVAL, TUNNELED CATH Right 7/19/2024    Procedure: REMOVAL, TUNNELED CATH;  Surgeon: Khoobehi, Ali, MD;  Location: [facility] CATH/EP LAB;  Service: Vascular;  Laterality: Right;    ROBOT-ASSISTED LAPAROSCOPIC RESECTION OF SIGMOID COLON USING DA DELANO XI N/A 9/17/2024    Procedure: XI ROBOTIC SIGMOID RESECTION, WITH ANASTAMOSIS;  Surgeon: Galileo Connors MD;  Location: 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     Social Determinants of Health     Financial Resource Strain: Low Risk  (9/18/2024)    Overall Financial Resource Strain (CARDIA)     Difficulty of Paying Living Expenses: Not very hard   Food Insecurity: No Food Insecurity (9/18/2024)    Hunger Vital Sign     Worried About Running Out of Food in the Last Year: Never true     Ran Out of Food in the Last Year: Never true   Transportation Needs: No Transportation Needs (9/18/2024)    TRANSPORTATION NEEDS     Transportation : No   Physical Activity: Sufficiently Active  (1/3/2023)    Exercise Vital Sign     Days of Exercise per Week: 6 days     Minutes of Exercise per Session: 60 min   Recent Concern: Physical Activity - Insufficiently Active (10/11/2022)    Exercise Vital Sign     Days of Exercise per Week: 3 days     Minutes of Exercise per Session: 30 min   Stress: Stress Concern Present (9/18/2024)    [employer] - Occupational Stress Questionnaire     Feeling of Stress : Very much   Housing Stability: Low Risk  (9/18/2024)    Housing Stability Vital Sign     Unable to Pay for Housing in the Last Year: No     Homeless in the Last Year: No       Medications:  No current facility-administered medications on file prior to encounter.     Current Outpatient Medications on File Prior to Encounter   Medication Sig Dispense Refill    apixaban (ELIQUIS) 2.5 mg Tab Take 2.5 mg by mouth 2 (two) times daily.      calcitRIOL (ROCALTROL) 0.25 MCG Cap Take 1 capsule by mouth once daily (Patient taking differently: Take 0.25 mcg by mouth once daily.) 90 capsule 1    cholecalciferol, vitamin D3, 125 mcg (5,000 unit) Tab Take 1 tablet by mouth once daily.      cloNIDine (CATAPRES) 0.3 MG tablet TAKE 1 TABLET BY MOUTH THREE TIMES DAILY 270 tablet 2    isosorbide mononitrate (IMDUR) 120 MG 24 hr tablet Take 120 mg by mouth once daily.      metoprolol succinate (TOPROL-XL) 50 MG 24 hr tablet Take 150 mg by mouth once daily.      olmesartan (BENICAR) 40 MG tablet Take 1 tablet by mouth once daily 90 tablet 0    sevelamer carbonate (RENVELA) 800 mg Tab Take 2 tablets (1,600 mg total) by mouth 3 (three) times daily with meals. 180 tablet 11    calcium carbonate (TUMS) 200 mg calcium (500 mg) chewable tablet Take 2 tablets (1,000 mg total) by mouth 3 (three) times daily with meals. (Patient taking differently: Take 1,000 mg by mouth 3 (three) times daily.) 180 tablet 11    hydrALAZINE (APRESOLINE) 100 MG tablet Take 1 tablet (100 mg total) by mouth 3 (three) times daily. 90  tablet 11     Scheduled Meds:   bisacodyL  5 mg Oral QHS    cloNIDine  0.3 mg Oral TID    DAPTOmycin (CUBICIN) IV (PEDS and ADULTS)  10 mg/kg Intravenous Q48H    DAPTOmycin (CUBICIN) IV (PEDS and ADULTS)  10 mg/kg Intravenous Once    epoetin mily-epbx  10,000 Units Intravenous Every Mon, Wed, Fri    fluconazole (DIFLUCAN) IV (PEDS and ADULTS)  200 mg Intravenous Q24H    gabapentin  200 mg Oral BID    heparin (porcine)  7,500 Units Subcutaneous Q8H    hydrALAZINE  100 mg Oral TID    isosorbide mononitrate  120 mg Oral Daily    metoprolol succinate  150 mg Oral Daily    olmesartan  40 mg Oral Daily    piperacillin-tazobactam (Zosyn) IV (PEDS and ADULTS) (extended infusion is not appropriate)  2.25 g Intravenous Q8H    polyethylene glycol  17 g Oral Daily    senna-docusate 8.6-50 mg  1 tablet Oral Daily    sevelamer carbonate  1,600 mg Oral TID WM    sodium zirconium cyclosilicate  10 g Oral TID     Continuous Infusions:      PRN Meds:.  Current Facility-Administered Medications:     0.9%  NaCl infusion (for blood administration), , Intravenous, Q24H PRN    0.9%  NaCl infusion (for blood administration), , Intravenous, Q24H PRN    0.9%  NaCl infusion (for blood administration), , Intravenous, Q24H PRN    0.9% NaCl, , Intravenous, PRN    acetaminophen, 650 mg, Oral, Q8H PRN    acetaminophen, 650 mg, Oral, Q4H PRN    aluminum-magnesium hydroxide-simethicone, 30 mL, Oral, QID PRN    dextrose 10%, 12.5 g, Intravenous, PRN    dextrose 10%, 12.5 g, Intravenous, PRN    dextrose 10%, 12.5 g, Intravenous, PRN    dextrose 10%, 25 g, Intravenous, PRN    dextrose 10%, 25 g, Intravenous, PRN    dextrose 10%, 25 g, Intravenous, PRN    glucagon (human recombinant), 1 mg, Intramuscular, PRN    glucose, 16 g, Oral, PRN    glucose, 24 g, Oral, PRN    heparin (porcine), 4,000 Units, Intravenous, PRN    HYDROcodone-acetaminophen, 2 tablet, Oral, Q4H PRN    HYDROmorphone, 1 mg, Intravenous, Q4H PRN    insulin aspart U-100, 0-5 Units,  "Subcutaneous, QID (AC + HS) PRN    iohexoL, 100 mL, Intravenous, ONCE PRN    magnesium oxide, 800 mg, Oral, PRN    magnesium oxide, 800 mg, Oral, PRN    melatonin, 6 mg, Oral, Nightly PRN    naloxone, 0.02 mg, Intravenous, PRN    ondansetron, 4 mg, Intravenous, Q6H PRN    potassium bicarbonate, 35 mEq, Oral, PRN    potassium bicarbonate, 50 mEq, Oral, PRN    potassium bicarbonate, 60 mEq, Oral, PRN    potassium, sodium phosphates, 2 packet, Oral, PRN    potassium, sodium phosphates, 2 packet, Oral, PRN    potassium, sodium phosphates, 2 packet, Oral, PRN    sodium chloride 0.9%, 250 mL, Intravenous, PRN    sodium chloride 0.9%, 3 mL, Intravenous, Q12H PRN    Allergies:  Mushroom    Past Family History:  Reviewed; refer to Hospitalist Admission Note    Review of Systems:  Review of Systems - All 14 systems reviewed and negative, except as noted in HPI    Physical Exam:    /78   Pulse 74   Temp 98.6 °F (37 °C) (Oral)   Resp 18   Ht 6' 2" (1.88 m)   Wt 134 kg (295 lb 6.7 oz)   SpO2 96%   BMI 37.93 kg/m²     General Appearance:    Alert, cooperative, no distress, appears stated age   Head:    Normocephalic, without obvious abnormality, atraumatic   Eyes:    PER, conjunctiva/corneas clear, EOM's intact in both eyes        Throat:   Lips, mucosa, and tongue normal; teeth and gums normal   Back:     Symmetric, no curvature, ROM normal, no CVA tenderness   Lungs:     Clear to auscultation bilaterally, respirations unlabored   Chest wall:    No tenderness or deformity   Heart:    Regular rate and rhythm, S1 and S2 normal, no murmur, rub   or gallop   Abdomen:     Soft, non-tender, bowel sounds active all four quadrants,     no masses, no organomegaly   Extremities:   Extremities normal, atraumatic, no cyanosis or edema   Pulses:   2+ and symmetric all extremities   MSK:   No joint or muscle swelling, tenderness or deformity   Skin:   Skin color, texture, turgor normal, no rashes or lesions   Neurologic:   " CNII-XII intact, normal strength and sensation       Throughout.  No flap     Results:  Lab Results   Component Value Date     (L) 09/23/2024    K 4.0 09/23/2024    CL 93 (L) 09/23/2024    CO2 22 (L) 09/23/2024    BUN 61 (H) 09/23/2024    CREATININE 16.0 (H) 09/23/2024    CALCIUM 9.1 09/23/2024    ANIONGAP 17 (H) 09/23/2024       Lab Results   Component Value Date    CALCIUM 9.1 09/23/2024    PHOS 5.2 (H) 09/20/2024     Recent Labs   Lab 09/23/24  0918   WBC 17.64*   RBC 2.61*   HGB 8.0*   HCT 25.4*      MCV 97   MCH 30.7   MCHC 31.5*     Jeremy Madrid MD    [facility] [place]  [phone]

## 2024-09-24 NOTE — PROGRESS NOTES
Multiple attempts to see the patient today.  Having procedure and not in room.  Discussed with nursing.  Continues to have bowel function.  Persistent drainage from the inguinal wound noted.  Urology evaluation noted.  Findings in the inguinal canal into the scrotum are most consistent with a phlegmon as opposed to a drainable abscess.  No surgical intervention was planned at this time.  We will continue to follow.  Okay to advance diet as tolerated.

## 2024-09-24 NOTE — CARE UPDATE
09/24/24 0141   Patient Assessment/Suction   Level of Consciousness (AVPU) alert   Respiratory Effort Normal;Unlabored   PRE-TX-O2   Device (Oxygen Therapy) nasal cannula   $ Is the patient on Low Flow Oxygen? Yes   Flow (L/min) (Oxygen Therapy) 2   SpO2 96 %   Preset CPAP/BiPAP Settings   Mode Of Delivery Standby  (pt took it off. He is on nasal cannula at this point)

## 2024-09-24 NOTE — INTERVAL H&P NOTE
The patient has been examined and the H&P has been reviewed:    I concur with the findings and no changes have occurred since H&P was written.    Procedure risks, benefits and alternative options discussed and understood by patient/family.          Active Hospital Problems    Diagnosis  POA    *Inguinal hernia of left side with gangrene [K40.40]  Yes    Dialysis AV fistula malfunction [T82.590A]  No    Essential hypertension [I10]  Yes    Sleep apnea [G47.30]  Yes    Pulmonary hypertension [I27.20]  Yes    ESRD (end stage renal disease) [N18.6]  Yes    Anemia of chronic disease [D63.8]  Yes      Resolved Hospital Problems   No resolved problems to display.

## 2024-09-24 NOTE — NURSING
Labs still pending collect, called lab and stated having a hard time getting labs, but someone is coming back to try and obtain labs again

## 2024-09-24 NOTE — PROGRESS NOTES
[facility]   Department of Infectious Disease  Progress Note        PATIENT NAME: João Ham  MRN: [id number]  TODAY'S DATE: 09/24/2024  ADMIT DATE: 9/17/2024  LOS: 7 days    CHIEF COMPLAINT: Fatigue and Groin Swelling (X 2 days.  Hx of Kidney failure and CHF)      PRINCIPLE PROBLEM: Inguinal hernia of left side with gangrene    INTERVAL HISTORY      09/23/2024: Seen and evaluated at bedside.  No acute issues.  Leukocytosis persists.  Afebrile.      09/24/2024:  Leukocytosis.  Remains afebrile.  Fluid in BERNABE drain remains dark green.  Seen by vascular surgeon with plan for left upper extremity AV fistula fistulogram.    Antibiotics (From admission, onward)      Start     Stop Route Frequency Ordered    09/23/24 1800  DAPTOmycin (CUBICIN) 1,340 mg in 0.9% NaCl SolP 50 mL IVPB         -- IV Every 48 hours (non-standard times) 09/22/24 1758    09/22/24 1800  DAPTOmycin (CUBICIN) 1,340 mg in 0.9% NaCl SolP 50 mL IVPB         -- IV Once 09/22/24 1801    09/22/24 1600  piperacillin-tazobactam (ZOSYN) 2.25 g in D5W 100 mL         -- IV Every 8 hours (non-standard times) 09/22/24 1529    09/17/24 2100  mupirocin 2 % ointment         09/22/24 2059 Nasl 2 times daily 09/17/24 1845          Antifungals (From admission, onward)      Start     Stop Route Frequency Ordered    09/22/24 1930  fluconazole (DIFLUCAN) IVPB 200 mg         -- IV Every 24 hours (non-standard times) 09/22/24 1520           Antivirals (From admission, onward)      None            ASSESSMENT and PLAN      1. Incarcerated left inguinal hernia with necrosis and left inguinal abscess.  Had I&D of abscess, partial sigmoid colectomy with primary anastomosis on 09/17/2024.  Developed leukocytosis 09/20/2024 which has worsened.  Antibiotics modified 09/22/2024.  CT abdomen and pelvis 09/22/2024 with foci of gas at the anastomotic site.  Need to consider possibility of anastomotic leak and abdominal abscess.  Continue current antibiotics.    2.  Leukocytosis.  As above    3. Scrotal edema worse on the left.  Ultrasound ordered by general surgeon.  Continue conservative management and monitor.    4. ESRD on hemodialysis Monday/ Wednesday/Friday.  He is on transplant list.  Currently has a nonfunctioning left upper extremity AV fistula.    RECOMMENDATIONS:    Continue current antibiotics   Continue to monitor abdomen.  Need to keep in mind possibility of anastomosis leak  Check CRP and procalcitonin  Send drainage left groin wound for culture    Please send Epic secure chat with any questions.      SUBJECTIVE      Antibiotics   Vancomycin: 09/17/2024-09/20/2024   Clindamycin:  09/07/2020 04/09/2020 2/24   Cefepime: 09/07/2024-09/22/2024   Daptomycin: 09/22/2024-  Fluconazole: 09/22/2024-  Zosyn: 09/22/2024-    Microbiology  Blood culture 09/07/2024: NGTD   Urine culture 09/19/2024: NGTD   Blood culture 09/24/2024: In progress    Review of Systems  Negative except as stated above in Interval History     OBJECTIVE   Temp:  [98.1 °F (36.7 °C)-99 °F (37.2 °C)] 98.5 °F (36.9 °C)  Pulse:  [72-82] 74  Resp:  [17-18] 18  SpO2:  [96 %-99 %] 96 %  BP: (118-161)/(71-86) 153/86  Temp:  [98.1 °F (36.7 °C)-99 °F (37.2 °C)]   Temp: 98.5 °F (36.9 °C) (09/24/24 0406)  Pulse: 74 (09/24/24 0821)  Resp: 18 (09/24/24 0821)  BP: (!) 153/86 (09/24/24 0406)  SpO2: 96 % (09/24/24 0821)    Intake/Output Summary (Last 24 hours) at 9/24/2024 0829  Last data filed at 9/23/2024 0852  Gross per 24 hour   Intake 120 ml   Output --   Net 120 ml       Physical Exam  General:  Middle-aged man who is ill-looking.  Man in no acute distress at this time   Abdomen:  Distended, soft, nontender, BERNABE drain with dark fluid, query bilious versus odor.  Serosanguineous drainage from corrugated drains left groin incision.  Bowel sounds hypoactive.    Genitals:  Edematous scrotum worse on the left  CVS: S1 and 2 heard, no murmurs appreciated   Respiratory: Clear to auscultation   Skin: No rash appreciated    Musculoskeletal system: No joint or bony abnormalities appreciated   CNS: No gross focal deficits  Psych: Good mood, normal affect.    VAD:  IJ Vas-Cath, PIV  ISOLATION:  None    WOUNDS:  Abdominal surgical wounds    Significant Labs: All pertinent labs within the past 24 hours have been reviewed.    CBC LAST 7 DAYS  Recent Labs   Lab 09/17/24  0851 09/18/24  0430 09/19/24  0500 09/20/24  0153 09/21/24  0558 09/22/24  0743 09/23/24  0918   WBC 14.64* 9.12 10.77 13.02* 13.43* 16.59* 17.64*   RBC 2.64* 2.36* 2.01* 2.35* 2.50* 2.41* 2.61*   HGB 8.7* 7.7* 6.6* 7.3* 7.6* 7.6* 8.0*   HCT 27.2* 23.9* 20.7* 22.5* 24.4* 24.2* 25.4*   * 101* 103* 96 98 100* 97   MCH 33.0* 32.6* 32.8* 31.1* 30.4 31.5* 30.7   MCHC 32.0 32.2 31.9* 32.4 31.1* 31.4* 31.5*   RDW 15.9* 15.9* 16.0* 20.7* 20.0* 20.0* 19.9*    255 266 239 249 232 357   MPV 9.6 10.5 10.1 10.3 10.7 11.7 10.5   GRAN 80.1*  11.7* 76.0* 72.7  7.8* 76.0* 55.0 58.0 77.0*   LYMPH 6.0*  0.9* 10.0* 9.0*  1.0 7.0* 15.0* 7.0* 12.0*  CANCELED   MONO 12.0  1.8* 14.0 15.8*  1.7* 5.0 14.0 10.0 2.0*  CANCELED   BASO 0.03  --  0.03  --   --   --  CANCELED   NRBC 0 0 0 0 0 0 0       CHEMISTRY LAST 7 DAYS  Recent Labs   Lab 09/18/24  0429 09/19/24  0500 09/20/24  0152 09/20/24  0153 09/21/24  0558 09/22/24  0743 09/23/24  0918   * 135* 133* 135* 134* 134* 132*   K 6.2* 4.4 4.9 5.0 4.5 4.9 4.0   CL 93* 91* 94* 95 97 97 93*   CO2 21* 26 26 26 24 21* 22*   ANIONGAP 20* 18* 13 14 13 16 17*   BUN 47* 56* 42* 42* 38* 54* 61*   CREATININE 16.2* 17.6* 13.2* 13.4* 11.6* 14.5* 16.0*   * 114* 105 104 106 100 120*   CALCIUM 8.3* 8.3* 8.2* 8.3* 8.5* 8.7 9.1   MG 1.9 1.8 1.8 1.8 1.9 2.1 2.2   ALBUMIN 2.5* 2.2* 3.2* 3.1* 3.1* 3.2* 3.2*   PROT 7.3 6.8 6.9 6.9 7.0 7.5 7.5   ALKPHOS 63 84 71 71 77 87 114   ALT 9* 14 14 14 11 13 26   AST 16 33 26 26 22 33 46*   BILITOT 0.8 0.5 0.6 0.6 0.5 0.4 0.5       Estimated Creatinine Clearance: 8.7 mL/min (A) (based on SCr of 16  "mg/dL (H)).    INFLAMMATORY/PROCAL  LAST 7 DAYS  Recent Labs   Lab 09/20/24  0152   CRP >16.00*     No results found for: "ESR"  CRP   Date Value Ref Range Status   09/20/2024 >16.00 (H) <1.00 mg/dL Final     Comment:     CRP-Normal Application expected values:   <1.0        mg/dL   Normal Range  1.0 - 5.0  mg/dL   Indicates mild inflammation  5.0 - 10.0 mg/dL   Indicates severe inflammation  >10.0        mg/dL   Represents serious processes and   frequently         indicates the presence of a bacterial   infection.      07/16/2024 >16.00 (H) <1.00 mg/dL Final     Comment:     CRP-Normal Application expected values:   <1.0        mg/dL   Normal Range  1.0 - 5.0  mg/dL   Indicates mild inflammation  5.0 - 10.0 mg/dL   Indicates severe inflammation  >10.0        mg/dL   Represents serious processes and   frequently         indicates the presence of a bacterial   infection.          PRIOR  MICROBIOLOGY:    Susceptibility data from last 90 days.  Collected Specimen Info Organism Ceftriaxone Clindamycin Erythromycin Oxacillin Penicillin Tetracycline Trimeth/Sulfa Vancomycin   09/17/24 Blood from Peripheral, Antecubital, Right No growth after 5 days.           09/17/24 Blood from Peripheral, Antecubital, Right No growth after 5 days.           07/19/24 Catheter Tip, Tunneled Staphylococcus aureus  S  S  I  S  R  S  S  S   07/16/24 Blood from Peripheral, Forearm, Right Staphylococcus aureus           07/15/24 Blood from Peripheral, Antecubital, Right Staphylococcus aureus  S  S  S  S  R  S  S  S   07/15/24 Blood from Peripheral, Hand, Right Staphylococcus aureus               LAST 7 DAYS MICROBIOLOGY   Microbiology Results (last 7 days)       Procedure Component Value Units Date/Time    Blood culture [[id number]]     Order Status: Sent Specimen: Blood     Blood culture [[id number]]     Order Status: Sent Specimen: Blood     Blood culture x two cultures. Draw prior to antibiotics. [[id number]] Collected: 09/17/24 0949    " Order Status: Completed Specimen: Blood from Peripheral, Antecubital, Right Updated: 09/22/24 1632     Blood Culture, Routine No growth after 5 days.    Narrative:      Aerobic and anaerobic    Blood culture x two cultures. Draw prior to antibiotics. [[id number]] Collected: 09/17/24 0949    Order Status: Completed Specimen: Blood from Peripheral, Antecubital, Right Updated: 09/22/24 1632     Blood Culture, Routine No growth after 5 days.    Narrative:      Aerobic and anaerobic    Urine culture [[id number]] Collected: 09/19/24 1435    Order Status: Completed Specimen: Urine Updated: 09/22/24 0740     Urine Culture, Routine No growth    Narrative:      Specimen Source->Urine            CURRENT/PREVIOUS VISIT EKG  Results for orders placed or performed during the hospital encounter of 09/17/24   EKG 12-lead    Collection Time: 09/17/24  8:57 AM   Result Value Ref Range    QRS Duration 92 ms    [facility] QTC Calculation 469 ms    Narrative    Test Reason : N18.6,    Vent. Rate : 119 BPM     Atrial Rate : 119 BPM     P-R Int : 134 ms          QRS Dur : 092 ms      QT Int : 334 ms       P-R-T Axes : 027 -78 004 degrees     QTc Int : 469 ms    Sinus tachycardia  Left axis deviation  Cannot rule out Anterior infarct ,age undetermined  Abnormal ECG  When compared with ECG of 15-JUL-2024 17:47,  T wave inversion now evident in Inferior leads  T wave inversion no longer evident in Lateral leads  Confirmed by Jose Cruz MD ([id number]) on 9/22/2024 1:02:21 PM    Referred By: [email or web address]   SELF           Confirmed By:Jose Cruz MD     Significant Imaging: I have reviewed all relevant and available imaging results/findings within the past 24 hours.    I spent a total of 55 minutes on the day of the visit.This includes face to face time and non-face to face time preparing to see the patient (eg, review of tests), obtaining and/or reviewing separately obtained history, documenting clinical information in the electronic or other  health record, independently interpreting results and communicating results to the patient/family/caregiver, or care coordinator.    Kerwin Au MD  Date of Service: 09/24/2024      This note was created using [employer] voice recognition software that occasionally misinterpreted phrases or words.

## 2024-09-24 NOTE — OP NOTE
OPERATIVE NOTE     DATE: 9/24/24                 SURGEON: Lorraine Salvador MD     PRE-OPERATIVE DIAGNOSIS:   Difficultly with dialysis access     POST-OPERATIVE DIAGNOSIS:   Difficultly with dialysis access  >95% stenosis of the cephalic vein     PROCEDURE:   Fistulogram with cephalic vein angioplasty     TECHNIQUE:  Patient was taken to the cath lab and placed in the supine position on the procedure table. The left arm was prepped and draped in the usual sterile fashion. A time out was performed and sedation was given. Under my direct supervision, moderate sedation was administered with an initial dose of 1mg Versed and 25 mcg Fentanyl. An independent observer was present throughout the procedure, there was continuous monitoring of cardiac telemetry, hemodynamics and pulse oximetry.  I was personally present and spent 30 minutes face to face time during sedation administration. Local anesthetic was injected at the access site. Antegrade access of the left radiocephalic fistula was obtained with micropuncture technique. Fistulogram was performed through the micro sheath showing a >95% stenosis of the cephalic vein. A glide wire crossed the lesion. A 6Fr sheath was placed. A 5 x 40mm Dewey balloon was used to angioplasty the segment. There was significant improvement. A 6 x 40mm drug-coated balloon was then used on the segment with good result. The fistula had a good thrill. The sheath was removed and a vicryl suture was placed. The patient tolerated the procedure well, and he was transferred to the recovery room in stable condition.     ANESTHESIA TYPE: Sedation and local anesthesia     TISSUE REMOVED: No     IMPLANTS: None     COMPLICATIONS: None observed     BLOOD LOSS: 10cc     FINDINGS:   >95% stenosis of basilic vein of the basilic vein

## 2024-09-24 NOTE — PROGRESS NOTES
Chief complaint:  Fatigue and Groin Swelling (X 2 days.  Hx of Kidney failure and CHF)      HPI:  João Ham is a 44 y.o. male presenting with Lap incisions to the abdomen with dressings intact, BERNABE drain, left groiin drains intact, clean  Multiple open surgical wounds. Pt was seen with the wound nurse at bedside and pt is post surgery. Pt would like to FU at the OP clinic on DC.    9/24/24  Pt seen at bedside for a FU visit for open incisions with drains of the abdomen and groin. Wounds are stable, nurse at bedside for the evaluation. No other complaints today. Report from nursing was that there may still be an abscess present.     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     Social Determinants of Health     Financial Resource Strain: Low Risk  (9/18/2024)    Overall Financial Resource Strain (CARDIA)     Difficulty of Paying Living Expenses: Not very hard   Food Insecurity: No Food Insecurity (9/18/2024)    Hunger Vital Sign     Worried About Running Out of Food in the Last Year: Never true     Ran Out of Food in the Last Year: Never true   Transportation Needs: No Transportation Needs (9/18/2024)    TRANSPORTATION NEEDS     Transportation : No   Physical Activity: Sufficiently Active (1/3/2023)    Exercise Vital Sign     Days of Exercise per Week: 6 days     Minutes of Exercise per Session: 60 min   Recent Concern: Physical Activity - Insufficiently  Active (10/11/2022)    Exercise Vital Sign     Days of Exercise per Week: 3 days     Minutes of Exercise per Session: 30 min   Stress: Stress Concern Present (9/18/2024)    [employer] - Occupational Stress Questionnaire     Feeling of Stress : Very much   Housing Stability: Low Risk  (9/18/2024)    Housing Stability Vital Sign     Unable to Pay for Housing in the Last Year: No     Homeless in the Last Year: No       Past Surgical History:   Procedure Laterality Date    ABSCESS DRAINAGE Left 9/17/2024    Procedure: DRAINAGE, ABSCESS, GROIN;  Surgeon: Galileo Connors MD;  Location: [facility];  Service: General;  Laterality: Left;    FISTULOGRAM Bilateral 4/30/2024    Procedure: Fistulogram;  Surgeon: Khoobehi, Ali, MD;  Location: [facility] CATH/EP LAB;  Service: Vascular;  Laterality: Bilateral;    HERNIA REPAIR Right     With mesh    INSERTION, CATHETER, TUNNELED N/A 6/22/2023    Procedure: Insertion,catheter,tunneled;  Surgeon: Everett Caicedo MD;  Location: [facility] OR;  Service: General;  Laterality: N/A;    PERCUTANEOUS TRANSLUMINAL ANGIOPLASTY OF ARTERIOVENOUS FISTULA Left 4/30/2024    Procedure: PTA, AV FISTULA;  Surgeon: Khoobehi, Ali, MD;  Location: [facility] CATH/EP LAB;  Service: Vascular;  Laterality: Left;    PHLEBOGRAPHY N/A 7/19/2024    Procedure: Venogram;  Surgeon: Khoobehi, Ali, MD;  Location: [facility] CATH/EP LAB;  Service: Vascular;  Laterality: N/A;    REMOVAL, TUNNELED CATH Right 7/19/2024    Procedure: REMOVAL, TUNNELED CATH;  Surgeon: Khoobehi, Ali, MD;  Location: [facility] CATH/EP LAB;  Service: Vascular;  Laterality: Right;    ROBOT-ASSISTED LAPAROSCOPIC RESECTION OF SIGMOID COLON USING DA DELANO XI N/A 9/17/2024    Procedure: XI ROBOTIC SIGMOID RESECTION, WITH ANASTAMOSIS;  Surgeon: Galileo Connors MD;  Location: [facility];  Service: General;  Laterality: N/A;  possible open have instruments available       No family history on file.    Review of patient's allergies indicates:    Allergen Reactions    Mushroom Swelling     Tongue swells    Tongue swells       Tongue swells       No current facility-administered medications on file prior to encounter.     Current Outpatient Medications on File Prior to Encounter   Medication Sig Dispense Refill    apixaban (ELIQUIS) 2.5 mg Tab Take 2.5 mg by mouth 2 (two) times daily.      calcitRIOL (ROCALTROL) 0.25 MCG Cap Take 1 capsule by mouth once daily (Patient taking differently: Take 0.25 mcg by mouth once daily.) 90 capsule 1    cholecalciferol, vitamin D3, 125 mcg (5,000 unit) Tab Take 1 tablet by mouth once daily.      cloNIDine (CATAPRES) 0.3 MG tablet TAKE 1 TABLET BY MOUTH THREE TIMES DAILY 270 tablet 2    isosorbide mononitrate (IMDUR) 120 MG 24 hr tablet Take 120 mg by mouth once daily.      metoprolol succinate (TOPROL-XL) 50 MG 24 hr tablet Take 150 mg by mouth once daily.      olmesartan (BENICAR) 40 MG tablet Take 1 tablet by mouth once daily 90 tablet 0    sevelamer carbonate (RENVELA) 800 mg Tab Take 2 tablets (1,600 mg total) by mouth 3 (three) times daily with meals. 180 tablet 11    calcium carbonate (TUMS) 200 mg calcium (500 mg) chewable tablet Take 2 tablets (1,000 mg total) by mouth 3 (three) times daily with meals. (Patient taking differently: Take 1,000 mg by mouth 3 (three) times daily.) 180 tablet 11    hydrALAZINE (APRESOLINE) 100 MG tablet Take 1 tablet (100 mg total) by mouth 3 (three) times daily. 90 tablet 11       ROS: As per HPI and below:  Pertinent items are noted in HPI.      Physical Exam:     Vitals:    24 0141 24 0406 24 0701 24 0821   BP:  (!) 153/86 135/78    Pulse:  77 81 74   Resp:  18 18 18   Temp:  98.5 °F (36.9 °C) 98.6 °F (37 °C)    TempSrc:   Oral    SpO2: 96% 96% 99% 96%   Weight:       Height:           BP  Min: 83/48  Max: 220/123  Temp  Av.6 °F (37 °C)  Min: 97 °F (36.1 °C)  Max: 102.3 °F (39.1 °C)  Pulse  Av.6  Min: 52  Max: 133  Resp  Av.7  Min: 9  Max:  "26  SpO2  Av.3 %  Min: 89 %  Max: 100 %  Height  Av' 1.5" (186.7 cm)  Min: 5' 11" (180.3 cm)  Max: 6' 2" (188 cm)  Weight  Av.2 kg (286 lb 15.5 oz)  Min: 126.1 kg (278 lb)  Max: 137 kg (302 lb 0.5 oz)    Body mass index is 37.93 kg/m².          General:             Well developed, well nourished, no apparent distress  HEENT:              NCAT, no JVD, mucous membranes moist, EOM intact  Cardiovascular:  Regular rate and rhythm, normal S1, normal S2, No murmurs, rubs, or gallops  Respiratory:        Normal breath sounds, no wheezes, no rales, no rhonchi  Abdomen:           Bowel sounds present, non tender, non distended, no masses, no hepatojugular reflux  Extremities:        No clubbing, no cyanosis, no edema  Vascular:            2+ b/l radial.  Peripheral pulses intact.  No carotid bruits.  Neurological:      No focal deficits  Skin:                   No obvious rashes or erythema, Lap incisions to the abdomen with dressings intact, BERNABE drain, left groiin drains intact, clean  Multiple open surgical wounds                        Lab Results   Component Value Date    WBC 17.64 (H) 2024    HGB 8.0 (L) 2024    HCT 25.4 (L) 2024    MCV 97 2024     2024     Lab Results   Component Value Date    CHOL 141 10/11/2022     Lab Results   Component Value Date    HDL 39 (L) 10/11/2022     Lab Results   Component Value Date    LDLCALC 88.8 10/11/2022     Lab Results   Component Value Date    TRIG 66 10/11/2022     Lab Results   Component Value Date    CHOLHDL 27.7 10/11/2022     CMP  No results for input(s): "GLU", "CALCIUM", "ALBUMIN", "PROT", "NA", "K", "CO2", "CL", "BUN", "CREATININE", "ALKPHOS", "ALT", "AST", "BILITOT" in the last 24 hours.     Lab Results   Component Value Date    TSH 1.699 10/10/2022           Assessment and Recommendations       Diagnoses:    Lap incisions to the abdomen with dressings intact, BERNABE drain, left groiin drains intact, clean  Multiple open " surgical wounds    Plan:  Continue ABD and mesh panty  Pt will FU at the OP clinic as needed    Complexity:    moderate

## 2024-09-24 NOTE — PROGRESS NOTES
I just examined that manuel. That's just an inflamed hydrocele adjacent to his left testicle secondary to his b epidymo orchitis. No tenderness overlying that hydrocele. No warmth. Will resolve eventually (months) on its own.   Therefor no procedure   For epididymo orchitis needs abx, elevation ice

## 2024-09-24 NOTE — CARE UPDATE
09/23/24 2210   Patient Assessment/Suction   Level of Consciousness (AVPU) alert   Respiratory Effort Normal;Unlabored   PRE-TX-O2   Device (Oxygen Therapy) BIPAP   $ Is the patient on High Flow Oxygen? Yes   Oxygen Analyzed Concentration (%) 40 %   SpO2 96 %   Pulse Oximetry Type Intermittent   $ Pulse Oximetry - Multiple Charge Pulse Oximetry - Multiple   Preset CPAP/BiPAP Settings   Mode Of Delivery BiPAP S/T   $ Initial CPAP/BiPAP Setup? No   $ Is patient using? Yes   Size of Mask Large   Sized Appropriately? Yes   Equipment Type V60   Humidifier not applicable   Ipap 10   EPAP (cm H2O) 5   Pressure Support (cm H2O) 5   Set Rate (Breaths/Min) 12   ITime (sec) 1   Rise Time (sec) 3   Patient CPAP/BiPAP Settings   CPAP/BIPAP ID 10   Timed Inspiration (Sec) 1   IPAP Rise Time (sec) 3   RR Total (Breaths/Min) 21   Tidal Volume (mL) 316   VE Minute Ventilation (L/min) 6.6 L/min   Peak Inspiratory Pressure (cm H2O) 11   TiTOT (%) 22   Total Leak (L/Min) 1   Patient Trigger - ST Mode Only (%) 100   CPAP/BiPAP Alarms   High Pressure (cm H2O) 40   Low Pressure (cm H2O) 5   High RR (breaths/min) 45   Low RR (breaths/min) 8   Apnea (Sec) 20   Education   $ Education BiPAP;15 min

## 2024-09-24 NOTE — CONSULTS
[facility], formerly known as [facility]  Group MD's:Jessica/Dino/Sabrina/Vandana  Group NP's: Noa Lobato, Kate Coates    PCP: Dawson Granado MD  Date of Service: 09/24/2024  Today's note written by: MD Jessica          Subjective:      Initial consult by me in hospital on 9/24/24:  João Ham is a 44 y.o. male with history of kidney failure admitted for incarcerated left inguinal hernia associated with med resulting in left inguinal abscess.  Underwent I and D of his abscess as well as a partial sigmoid colectomy with primary anastomosis on 09/17/2024.  Drain in place.  Recent CT on 09/22/2024 shows foci of gas at anastomotic site.      He had some swelling noted in his scrotum that had been there apparently prior to his admit likely associated with his necrotic inguinal hernia and the ultrasound of the scrotum done on 09/23/2024 showed findings suspicious for bilateral epididymal orchitis, complex left paratesticular collection concerning for a pyocele and a small right hydrocele.    Yesterday his white count was 17.64 and has been slowly rising Over the past 5 days.  No fevers.    He states that he had bilateral ended testicles prior to coming and had swelling in both testicles but then localized the left since his surgery.  However his left side is not tender to touch    It does appear that he has seen us twice for urinary frequency and ED in the clinic    Ultrasound scrotum 09/23/2024        LABS REVIEW:  Recent labs:   Recent Labs   Lab 09/21/24  0558 09/22/24  0743 09/23/24  0918   WBC 13.43 H 16.59 H 17.64 H   Hemoglobin 7.6 L 7.6 L 8.0 L   Hematocrit 24.4 L 24.2 L 25.4 L   Platelets 249 232 357   ]  Recent Labs   Lab 06/27/23  0600 06/28/23  0315 04/30/24  1020 09/20/24  0153 09/21/24  0558 09/22/24  0743 09/23/24  0918   Sodium 137 139   < > 135 L 134 L 134 L 132 L   Potassium 4.0 4.8   < > 5.0 4.5 4.9 4.0   Chloride 102 99   < > 95 97 97 93 L   CO2  24 27   < > 26 24 21 L 22 L   BUN 29 H 37 H   < > 42 H 38 H 54 H 61 H   Creatinine 8.4 H 11.4 H   < > 13.4 H 11.6 H 14.5 H 16.0 H   Glucose 106 112 H   < > 104 106 100 120 H   Calcium 9.9 10.5   < > 8.3 L 8.5 L 8.7 9.1   Magnesium 2.0 2.4   < > 1.8 1.9 2.1 2.2   Phosphorus 5.0 H 5.4 H  --  5.2 H  --   --   --    Alkaline Phosphatase  --   --    < > 71 77 87 114   Total Protein  --   --    < > 6.9 7.0 7.5 7.5   Albumin 3.8 4.0   < > 3.1 L 3.1 L 3.2 L 3.2 L   Total Bilirubin  --   --    < > 0.6 0.5 0.4 0.5   AST  --   --    < > 26 22 33 46 H   ALT  --   --    < > 14 11 13 26    < > = values in this interval not displayed.       Urine history:           PSA history:    Lab Results   Component Value Date    PSA 0.48 06/25/2024    PSA 0.36 01/10/2023         Current REVIEW OF SYSTEMS:  Negative for the remaining 12 points of ROS except for as stated above       PMHx:  Past Medical History:   Diagnosis Date    Allergy     Anemia     Anxiety     CHF (congestive heart failure)     Depression     High blood pressure with chronic kidney disease, stage 5 chronic kidney disease or end stage renal disease     Hypertension        PSHx:  Past Surgical History:   Procedure Laterality Date    ABSCESS DRAINAGE Left 9/17/2024    Procedure: DRAINAGE, ABSCESS, GROIN;  Surgeon: Galileo Connors MD;  Location: [facility] OR;  Service: General;  Laterality: Left;    FISTULOGRAM Bilateral 4/30/2024    Procedure: Fistulogram;  Surgeon: Khoobehi, Ali, MD;  Location: [facility] CATH/EP LAB;  Service: Vascular;  Laterality: Bilateral;    HERNIA REPAIR Right     With mesh    INSERTION, CATHETER, TUNNELED N/A 6/22/2023    Procedure: Insertion,catheter,tunneled;  Surgeon: Everett Caicedo MD;  Location: [facility] OR;  Service: General;  Laterality: N/A;    PERCUTANEOUS TRANSLUMINAL ANGIOPLASTY OF ARTERIOVENOUS FISTULA Left 4/30/2024    Procedure: PTA, AV FISTULA;  Surgeon: Khoobehi, Ali, MD;  Location: [facility] CATH/EP LAB;  Service: Vascular;  Laterality: Left;     PHLEBOGRAPHY N/A 7/19/2024    Procedure: Venogram;  Surgeon: Khoobehi, Ali, MD;  Location: [facility] CATH/EP LAB;  Service: Vascular;  Laterality: N/A;    REMOVAL, TUNNELED CATH Right 7/19/2024    Procedure: REMOVAL, TUNNELED CATH;  Surgeon: Khoobehi, Ali, MD;  Location: [facility] CATH/EP LAB;  Service: Vascular;  Laterality: Right;    ROBOT-ASSISTED LAPAROSCOPIC RESECTION OF SIGMOID COLON USING DA DELANO XI N/A 9/17/2024    Procedure: XI ROBOTIC SIGMOID RESECTION, WITH ANASTAMOSIS;  Surgeon: Galileo Connors MD;  Location: [facility] OR;  Service: General;  Laterality: N/A;  possible open have instruments available       Fam Hx:  Reviewed- pertinent family hx as above    Soc Hx:  Social History     Tobacco Use    Smoking status: Never     Passive exposure: Never    Smokeless tobacco: Never   Substance Use Topics    Alcohol use: Never    Drug use: Never       Allergies:  Mushroom    Anticoagulation/Aspirin: hepari    Objective:     Vitals:    09/24/24 0821   BP:    Pulse: 74   Resp: 18   Temp:        On exam his left inguinal incision has a Penrose draining from it.  No obvious pus draining around the Penrose.  His testicles are slightly tender to palpation.  On his left he has a indurated area adjacent to his left testicle that is not fluctuant.  Not warm and not tender to palpation    Pertinent urologic PATHOLOGY AND RADIOLOGY REVIEW:  See hpi for recent      Assessment:     João Ham is a 44 y.o. male with   1. Scrotal edema    2. ESRD (end stage renal disease)    3. Chest pain    4. Necrotizing soft tissue infection    5. Inguinal hernia of left side with gangrene    6. Malfunction of arteriovenous dialysis fistula, initial encounter      urology consulted for:  Possible left peritesticular pyocele which on exam is not consistent with a pyocele.   Plan:     Possible left peritesticular pyocele which on exam is not consistent with a pyocele.  It is not tender.  It is not warm.  It is likely a complex hydrocele as a result of  his epididymal orchitis.  At this point I do not recommend any drainage or treatment other than antibiotics.  Also he can receive scrotal elevation and ice for his bilateral epididymal orchitis      Angie Lopez MD

## 2024-09-25 ENCOUNTER — ANESTHESIA EVENT (OUTPATIENT)
Dept: SURGERY | Facility: HOSPITAL | Age: 44
End: 2024-09-25
Payer: COMMERCIAL

## 2024-09-25 ENCOUNTER — ANESTHESIA (OUTPATIENT)
Dept: SURGERY | Facility: HOSPITAL | Age: 44
End: 2024-09-25
Payer: COMMERCIAL

## 2024-09-25 PROBLEM — A41.9 SEPSIS: Status: ACTIVE | Noted: 2024-09-25

## 2024-09-25 PROBLEM — J96.01 ACUTE HYPOXIC RESPIRATORY FAILURE: Status: ACTIVE | Noted: 2024-09-25

## 2024-09-25 PROBLEM — K63.1 PERFORATION OF INTESTINE: Status: ACTIVE | Noted: 2024-09-25

## 2024-09-25 LAB
ABO + RH BLD: NORMAL
ALBUMIN SERPL BCP-MCNC: 3 G/DL (ref 3.5–5.2)
ALBUMIN SERPL BCP-MCNC: 3.1 G/DL (ref 3.5–5.2)
ALLENS TEST: ABNORMAL
ALP SERPL-CCNC: 152 U/L (ref 55–135)
ALP SERPL-CCNC: 161 U/L (ref 55–135)
ALT SERPL W/O P-5'-P-CCNC: 45 U/L (ref 10–44)
ALT SERPL W/O P-5'-P-CCNC: 48 U/L (ref 10–44)
ANION GAP SERPL CALC-SCNC: 15 MMOL/L (ref 8–16)
ANION GAP SERPL CALC-SCNC: 20 MMOL/L (ref 8–16)
ANISOCYTOSIS BLD QL SMEAR: ABNORMAL
ANISOCYTOSIS BLD QL SMEAR: SLIGHT
AST SERPL-CCNC: 49 U/L (ref 10–40)
AST SERPL-CCNC: 64 U/L (ref 10–40)
BASOPHILS NFR BLD: 0 % (ref 0–1.9)
BASOPHILS NFR BLD: 1 % (ref 0–1.9)
BILIRUB SERPL-MCNC: 0.6 MG/DL (ref 0.1–1)
BILIRUB SERPL-MCNC: 0.9 MG/DL (ref 0.1–1)
BLD GP AB SCN CELLS X3 SERPL QL: NORMAL
BLD PROD TYP BPU: NORMAL
BLD PROD TYP BPU: NORMAL
BLOOD UNIT EXPIRATION DATE: NORMAL
BLOOD UNIT EXPIRATION DATE: NORMAL
BLOOD UNIT TYPE CODE: 6200
BLOOD UNIT TYPE CODE: 6200
BLOOD UNIT TYPE: NORMAL
BLOOD UNIT TYPE: NORMAL
BUN SERPL-MCNC: 46 MG/DL (ref 6–20)
BUN SERPL-MCNC: 51 MG/DL (ref 6–20)
CALCIUM SERPL-MCNC: 8.7 MG/DL (ref 8.7–10.5)
CALCIUM SERPL-MCNC: 8.9 MG/DL (ref 8.7–10.5)
CHLORIDE SERPL-SCNC: 97 MMOL/L (ref 95–110)
CHLORIDE SERPL-SCNC: 98 MMOL/L (ref 95–110)
CO2 SERPL-SCNC: 18 MMOL/L (ref 23–29)
CO2 SERPL-SCNC: 25 MMOL/L (ref 23–29)
CODING SYSTEM: NORMAL
CODING SYSTEM: NORMAL
CREAT SERPL-MCNC: 13 MG/DL (ref 0.5–1.4)
CREAT SERPL-MCNC: 13.4 MG/DL (ref 0.5–1.4)
CROSSMATCH INTERPRETATION: NORMAL
CROSSMATCH INTERPRETATION: NORMAL
DELSYS: ABNORMAL
DIFFERENTIAL METHOD BLD: ABNORMAL
DIFFERENTIAL METHOD BLD: ABNORMAL
DISPENSE STATUS: NORMAL
DISPENSE STATUS: NORMAL
EOSINOPHIL NFR BLD: 0 % (ref 0–8)
EOSINOPHIL NFR BLD: 0 % (ref 0–8)
ERYTHROCYTE [DISTWIDTH] IN BLOOD BY AUTOMATED COUNT: 20 % (ref 11.5–14.5)
ERYTHROCYTE [DISTWIDTH] IN BLOOD BY AUTOMATED COUNT: 22.7 % (ref 11.5–14.5)
EST. GFR  (NO RACE VARIABLE): 4.3 ML/MIN/1.73 M^2
EST. GFR  (NO RACE VARIABLE): 4.4 ML/MIN/1.73 M^2
FLOW: 5
GIANT PLATELETS BLD QL SMEAR: PRESENT
GLUCOSE SERPL-MCNC: 108 MG/DL (ref 70–110)
GLUCOSE SERPL-MCNC: 136 MG/DL (ref 70–110)
HCO3 UR-SCNC: 23.6 MMOL/L (ref 24–28)
HCT VFR BLD AUTO: 22.2 % (ref 40–54)
HCT VFR BLD AUTO: 29.4 % (ref 40–54)
HGB BLD-MCNC: 7.2 G/DL (ref 14–18)
HGB BLD-MCNC: 9.5 G/DL (ref 14–18)
HPRA INTERPRETATION: NORMAL
HYPOCHROMIA BLD QL SMEAR: ABNORMAL
IMM GRANULOCYTES # BLD AUTO: ABNORMAL K/UL (ref 0–0.04)
IMM GRANULOCYTES # BLD AUTO: ABNORMAL K/UL (ref 0–0.04)
IMM GRANULOCYTES NFR BLD AUTO: ABNORMAL % (ref 0–0.5)
IMM GRANULOCYTES NFR BLD AUTO: ABNORMAL % (ref 0–0.5)
LYMPHOCYTES NFR BLD: 3 % (ref 18–48)
LYMPHOCYTES NFR BLD: 8 % (ref 18–48)
MAGNESIUM SERPL-MCNC: 2.2 MG/DL (ref 1.6–2.6)
MAGNESIUM SERPL-MCNC: 2.2 MG/DL (ref 1.6–2.6)
MCH RBC QN AUTO: 30.4 PG (ref 27–31)
MCH RBC QN AUTO: 31.9 PG (ref 27–31)
MCHC RBC AUTO-ENTMCNC: 32.3 G/DL (ref 32–36)
MCHC RBC AUTO-ENTMCNC: 32.4 G/DL (ref 32–36)
MCV RBC AUTO: 94 FL (ref 82–98)
MCV RBC AUTO: 98 FL (ref 82–98)
METAMYELOCYTES NFR BLD MANUAL: 2 %
MODE: ABNORMAL
MONOCYTES NFR BLD: 2 % (ref 4–15)
MONOCYTES NFR BLD: 2 % (ref 4–15)
NEUTROPHILS NFR BLD: 79 % (ref 38–73)
NEUTROPHILS NFR BLD: 81 % (ref 38–73)
NEUTS BAND NFR BLD MANUAL: 13 %
NEUTS BAND NFR BLD MANUAL: 9 %
NRBC BLD-RTO: 0 /100 WBC
NRBC BLD-RTO: 1 /100 WBC
NUM UNITS TRANS PACKED RBC: NORMAL
NUM UNITS TRANS PACKED RBC: NORMAL
PCO2 BLDA: 37.7 MMHG (ref 35–45)
PH SMN: 7.4 [PH] (ref 7.35–7.45)
PLATELET # BLD AUTO: 389 K/UL (ref 150–450)
PLATELET # BLD AUTO: 470 K/UL (ref 150–450)
PLATELET BLD QL SMEAR: ABNORMAL
PLATELET BLD QL SMEAR: ABNORMAL
PMV BLD AUTO: 10.3 FL (ref 9.2–12.9)
PMV BLD AUTO: 10.3 FL (ref 9.2–12.9)
PO2 BLDA: 87 MMHG (ref 80–100)
POC BE: -1 MMOL/L
POC SATURATED O2: 97 % (ref 95–100)
POC TCO2: 25 MMOL/L (ref 23–27)
POLYCHROMASIA BLD QL SMEAR: ABNORMAL
POTASSIUM SERPL-SCNC: 4 MMOL/L (ref 3.5–5.1)
POTASSIUM SERPL-SCNC: 4.4 MMOL/L (ref 3.5–5.1)
PROT SERPL-MCNC: 7.2 G/DL (ref 6–8.4)
PROT SERPL-MCNC: 7.4 G/DL (ref 6–8.4)
RBC # BLD AUTO: 2.26 M/UL (ref 4.6–6.2)
RBC # BLD AUTO: 3.13 M/UL (ref 4.6–6.2)
SAMPLE: ABNORMAL
SITE: ABNORMAL
SODIUM SERPL-SCNC: 136 MMOL/L (ref 136–145)
SODIUM SERPL-SCNC: 137 MMOL/L (ref 136–145)
SP02: 96
SPECIMEN OUTDATE: NORMAL
WBC # BLD AUTO: 17.03 K/UL (ref 3.9–12.7)
WBC # BLD AUTO: 22.95 K/UL (ref 3.9–12.7)

## 2024-09-25 PROCEDURE — 87070 CULTURE OTHR SPECIMN AEROBIC: CPT

## 2024-09-25 PROCEDURE — 83735 ASSAY OF MAGNESIUM: CPT | Mod: 91 | Performed by: INTERNAL MEDICINE

## 2024-09-25 PROCEDURE — 86850 RBC ANTIBODY SCREEN: CPT

## 2024-09-25 PROCEDURE — 37000009 HC ANESTHESIA EA ADD 15 MINS: Performed by: SURGERY

## 2024-09-25 PROCEDURE — 36000708 HC OR TIME LEV III 1ST 15 MIN: Performed by: SURGERY

## 2024-09-25 PROCEDURE — 87206 SMEAR FLUORESCENT/ACID STAI: CPT | Performed by: SURGERY

## 2024-09-25 PROCEDURE — 44139 MOBILIZATION OF COLON: CPT | Mod: 78,,, | Performed by: SURGERY

## 2024-09-25 PROCEDURE — C1751 CATH, INF, PER/CENT/MIDLINE: HCPCS

## 2024-09-25 PROCEDURE — P9016 RBC LEUKOCYTES REDUCED: HCPCS | Performed by: INTERNAL MEDICINE

## 2024-09-25 PROCEDURE — 63600175 PHARM REV CODE 636 W HCPCS: Performed by: SURGERY

## 2024-09-25 PROCEDURE — C9248 INJ, CLEVIDIPINE BUTYRATE: HCPCS

## 2024-09-25 PROCEDURE — 36600 WITHDRAWAL OF ARTERIAL BLOOD: CPT

## 2024-09-25 PROCEDURE — 63600175 PHARM REV CODE 636 W HCPCS

## 2024-09-25 PROCEDURE — 99900035 HC TECH TIME PER 15 MIN (STAT)

## 2024-09-25 PROCEDURE — 94660 CPAP INITIATION&MGMT: CPT

## 2024-09-25 PROCEDURE — 87205 SMEAR GRAM STAIN: CPT | Performed by: SURGERY

## 2024-09-25 PROCEDURE — 63600175 PHARM REV CODE 636 W HCPCS: Mod: JZ,JG | Performed by: SURGERY

## 2024-09-25 PROCEDURE — 25000003 PHARM REV CODE 250: Performed by: SURGERY

## 2024-09-25 PROCEDURE — 25000003 PHARM REV CODE 250

## 2024-09-25 PROCEDURE — 25000003 PHARM REV CODE 250: Performed by: STUDENT IN AN ORGANIZED HEALTH CARE EDUCATION/TRAINING PROGRAM

## 2024-09-25 PROCEDURE — 94761 N-INVAS EAR/PLS OXIMETRY MLT: CPT

## 2024-09-25 PROCEDURE — 86901 BLOOD TYPING SEROLOGIC RH(D): CPT

## 2024-09-25 PROCEDURE — 94799 UNLISTED PULMONARY SVC/PX: CPT

## 2024-09-25 PROCEDURE — 87186 SC STD MICRODIL/AGAR DIL: CPT | Performed by: SURGERY

## 2024-09-25 PROCEDURE — 82803 BLOOD GASES ANY COMBINATION: CPT

## 2024-09-25 PROCEDURE — 25500020 PHARM REV CODE 255

## 2024-09-25 PROCEDURE — 85027 COMPLETE CBC AUTOMATED: CPT | Performed by: STUDENT IN AN ORGANIZED HEALTH CARE EDUCATION/TRAINING PROGRAM

## 2024-09-25 PROCEDURE — 63600175 PHARM REV CODE 636 W HCPCS: Performed by: STUDENT IN AN ORGANIZED HEALTH CARE EDUCATION/TRAINING PROGRAM

## 2024-09-25 PROCEDURE — 36415 COLL VENOUS BLD VENIPUNCTURE: CPT | Performed by: INTERNAL MEDICINE

## 2024-09-25 PROCEDURE — 99900031 HC PATIENT EDUCATION (STAT)

## 2024-09-25 PROCEDURE — 63600175 PHARM REV CODE 636 W HCPCS: Mod: JZ,JG | Performed by: ANESTHESIOLOGY

## 2024-09-25 PROCEDURE — 87070 CULTURE OTHR SPECIMN AEROBIC: CPT | Mod: 59 | Performed by: SURGERY

## 2024-09-25 PROCEDURE — 44141 PARTIAL REMOVAL OF COLON: CPT | Mod: 78,,, | Performed by: SURGERY

## 2024-09-25 PROCEDURE — 87185 SC STD ENZYME DETCJ PER NZM: CPT | Performed by: SURGERY

## 2024-09-25 PROCEDURE — 87102 FUNGUS ISOLATION CULTURE: CPT | Performed by: SURGERY

## 2024-09-25 PROCEDURE — 27100171 HC OXYGEN HIGH FLOW UP TO 24 HOURS

## 2024-09-25 PROCEDURE — 86920 COMPATIBILITY TEST SPIN: CPT | Performed by: INTERNAL MEDICINE

## 2024-09-25 PROCEDURE — 37000008 HC ANESTHESIA 1ST 15 MINUTES: Performed by: SURGERY

## 2024-09-25 PROCEDURE — 20000000 HC ICU ROOM

## 2024-09-25 PROCEDURE — 36000709 HC OR TIME LEV III EA ADD 15 MIN: Performed by: SURGERY

## 2024-09-25 PROCEDURE — C9290 INJ, BUPIVACAINE LIPOSOME: HCPCS | Performed by: SURGERY

## 2024-09-25 PROCEDURE — 27201423 OPTIME MED/SURG SUP & DEVICES STERILE SUPPLY: Performed by: SURGERY

## 2024-09-25 PROCEDURE — 87075 CULTR BACTERIA EXCEPT BLOOD: CPT | Performed by: SURGERY

## 2024-09-25 PROCEDURE — 80053 COMPREHEN METABOLIC PANEL: CPT | Performed by: STUDENT IN AN ORGANIZED HEALTH CARE EDUCATION/TRAINING PROGRAM

## 2024-09-25 PROCEDURE — 85007 BL SMEAR W/DIFF WBC COUNT: CPT | Mod: 91 | Performed by: INTERNAL MEDICINE

## 2024-09-25 PROCEDURE — 87116 MYCOBACTERIA CULTURE: CPT | Performed by: SURGERY

## 2024-09-25 PROCEDURE — 63600175 PHARM REV CODE 636 W HCPCS: Performed by: INTERNAL MEDICINE

## 2024-09-25 PROCEDURE — C9248 INJ, CLEVIDIPINE BUTYRATE: HCPCS | Performed by: INTERNAL MEDICINE

## 2024-09-25 PROCEDURE — 87040 BLOOD CULTURE FOR BACTERIA: CPT | Performed by: INTERNAL MEDICINE

## 2024-09-25 PROCEDURE — 83735 ASSAY OF MAGNESIUM: CPT | Performed by: STUDENT IN AN ORGANIZED HEALTH CARE EDUCATION/TRAINING PROGRAM

## 2024-09-25 PROCEDURE — 85007 BL SMEAR W/DIFF WBC COUNT: CPT | Performed by: STUDENT IN AN ORGANIZED HEALTH CARE EDUCATION/TRAINING PROGRAM

## 2024-09-25 PROCEDURE — 36415 COLL VENOUS BLD VENIPUNCTURE: CPT | Performed by: STUDENT IN AN ORGANIZED HEALTH CARE EDUCATION/TRAINING PROGRAM

## 2024-09-25 PROCEDURE — 99291 CRITICAL CARE FIRST HOUR: CPT | Mod: ,,, | Performed by: INTERNAL MEDICINE

## 2024-09-25 PROCEDURE — 36410 VNPNXR 3YR/> PHY/QHP DX/THER: CPT

## 2024-09-25 PROCEDURE — 85027 COMPLETE CBC AUTOMATED: CPT | Mod: 91 | Performed by: INTERNAL MEDICINE

## 2024-09-25 PROCEDURE — 36415 COLL VENOUS BLD VENIPUNCTURE: CPT

## 2024-09-25 PROCEDURE — 99233 SBSQ HOSP IP/OBS HIGH 50: CPT | Mod: ,,, | Performed by: INTERNAL MEDICINE

## 2024-09-25 PROCEDURE — 86832 HLA CLASS I HIGH DEFIN QUAL: CPT | Mod: TXP | Performed by: NURSE PRACTITIONER

## 2024-09-25 PROCEDURE — 86833 HLA CLASS II HIGH DEFIN QUAL: CPT | Mod: TXP | Performed by: NURSE PRACTITIONER

## 2024-09-25 PROCEDURE — C1765 ADHESION BARRIER: HCPCS | Performed by: SURGERY

## 2024-09-25 PROCEDURE — 90935 HEMODIALYSIS ONE EVALUATION: CPT

## 2024-09-25 PROCEDURE — 80053 COMPREHEN METABOLIC PANEL: CPT | Mod: 91 | Performed by: INTERNAL MEDICINE

## 2024-09-25 DEVICE — MEMBRANE SEPRAFILM 5 X 6: Type: IMPLANTABLE DEVICE | Site: ABDOMEN | Status: FUNCTIONAL

## 2024-09-25 RX ORDER — BUPIVACAINE HYDROCHLORIDE AND EPINEPHRINE 2.5; 5 MG/ML; UG/ML
INJECTION, SOLUTION EPIDURAL; INFILTRATION; INTRACAUDAL; PERINEURAL
Status: DISCONTINUED | OUTPATIENT
Start: 2024-09-25 | End: 2024-09-25 | Stop reason: HOSPADM

## 2024-09-25 RX ORDER — HYDROCODONE BITARTRATE AND ACETAMINOPHEN 500; 5 MG/1; MG/1
TABLET ORAL
Status: DISCONTINUED | OUTPATIENT
Start: 2024-09-25 | End: 2024-09-25

## 2024-09-25 RX ORDER — CLONIDINE HYDROCHLORIDE 0.1 MG/1
0.1 TABLET ORAL 2 TIMES DAILY
Status: DISCONTINUED | OUTPATIENT
Start: 2024-09-25 | End: 2024-09-25

## 2024-09-25 RX ORDER — MIDAZOLAM HYDROCHLORIDE 1 MG/ML
INJECTION INTRAMUSCULAR; INTRAVENOUS
Status: DISCONTINUED | OUTPATIENT
Start: 2024-09-25 | End: 2024-09-25

## 2024-09-25 RX ORDER — PROPOFOL 10 MG/ML
VIAL (ML) INTRAVENOUS
Status: DISCONTINUED | OUTPATIENT
Start: 2024-09-25 | End: 2024-09-25

## 2024-09-25 RX ORDER — ACETAMINOPHEN 10 MG/ML
1000 INJECTION, SOLUTION INTRAVENOUS EVERY 8 HOURS
Status: COMPLETED | OUTPATIENT
Start: 2024-09-25 | End: 2024-09-26

## 2024-09-25 RX ORDER — HYDROMORPHONE HYDROCHLORIDE 1 MG/ML
1 INJECTION, SOLUTION INTRAMUSCULAR; INTRAVENOUS; SUBCUTANEOUS ONCE
Status: DISCONTINUED | OUTPATIENT
Start: 2024-09-25 | End: 2024-10-06 | Stop reason: HOSPADM

## 2024-09-25 RX ORDER — FENTANYL CITRATE 50 UG/ML
INJECTION, SOLUTION INTRAMUSCULAR; INTRAVENOUS
Status: DISCONTINUED | OUTPATIENT
Start: 2024-09-25 | End: 2024-09-25

## 2024-09-25 RX ORDER — HYDRALAZINE HYDROCHLORIDE 20 MG/ML
10 INJECTION INTRAMUSCULAR; INTRAVENOUS ONCE
Status: COMPLETED | OUTPATIENT
Start: 2024-09-25 | End: 2024-09-25

## 2024-09-25 RX ORDER — FAMOTIDINE 10 MG/ML
INJECTION INTRAVENOUS
Status: DISCONTINUED | OUTPATIENT
Start: 2024-09-25 | End: 2024-09-25

## 2024-09-25 RX ORDER — KETAMINE HCL IN 0.9 % NACL 50 MG/5 ML
SYRINGE (ML) INTRAVENOUS
Status: DISCONTINUED | OUTPATIENT
Start: 2024-09-25 | End: 2024-09-25

## 2024-09-25 RX ORDER — ROCURONIUM BROMIDE 10 MG/ML
INJECTION, SOLUTION INTRAVENOUS
Status: DISCONTINUED | OUTPATIENT
Start: 2024-09-25 | End: 2024-09-25

## 2024-09-25 RX ORDER — LIDOCAINE HYDROCHLORIDE 20 MG/ML
INJECTION, SOLUTION EPIDURAL; INFILTRATION; INTRACAUDAL; PERINEURAL
Status: DISCONTINUED | OUTPATIENT
Start: 2024-09-25 | End: 2024-09-25

## 2024-09-25 RX ORDER — MIDODRINE HYDROCHLORIDE 2.5 MG/1
5 TABLET ORAL ONCE
Status: COMPLETED | OUTPATIENT
Start: 2024-09-25 | End: 2024-09-25

## 2024-09-25 RX ORDER — VECURONIUM BROMIDE 1 MG/ML
INJECTION, POWDER, LYOPHILIZED, FOR SOLUTION INTRAVENOUS
Status: DISCONTINUED | OUTPATIENT
Start: 2024-09-25 | End: 2024-09-25

## 2024-09-25 RX ORDER — SODIUM CHLORIDE, SODIUM LACTATE, POTASSIUM CHLORIDE, CALCIUM CHLORIDE 600; 310; 30; 20 MG/100ML; MG/100ML; MG/100ML; MG/100ML
INJECTION, SOLUTION INTRAVENOUS CONTINUOUS
Status: DISCONTINUED | OUTPATIENT
Start: 2024-09-25 | End: 2024-09-25

## 2024-09-25 RX ORDER — SUCCINYLCHOLINE CHLORIDE 20 MG/ML
INJECTION INTRAMUSCULAR; INTRAVENOUS
Status: DISCONTINUED | OUTPATIENT
Start: 2024-09-25 | End: 2024-09-25

## 2024-09-25 RX ORDER — HYDRALAZINE HYDROCHLORIDE 20 MG/ML
INJECTION INTRAMUSCULAR; INTRAVENOUS
Status: DISPENSED
Start: 2024-09-25 | End: 2024-09-26

## 2024-09-25 RX ORDER — IODIXANOL 320 MG/ML
100 INJECTION, SOLUTION INTRAVASCULAR
Status: COMPLETED | OUTPATIENT
Start: 2024-09-25 | End: 2024-09-25

## 2024-09-25 RX ADMIN — FENTANYL CITRATE 50 MCG: 0.05 INJECTION, SOLUTION INTRAMUSCULAR; INTRAVENOUS at 03:09

## 2024-09-25 RX ADMIN — ROCURONIUM BROMIDE 30 MG: 10 INJECTION, SOLUTION INTRAVENOUS at 01:09

## 2024-09-25 RX ADMIN — HEPARIN SODIUM 7500 UNITS: 5000 INJECTION INTRAVENOUS; SUBCUTANEOUS at 10:09

## 2024-09-25 RX ADMIN — CLEVIPIDINE 21 MG/HR: 0.5 EMULSION INTRAVENOUS at 06:09

## 2024-09-25 RX ADMIN — HEPARIN SODIUM 7500 UNITS: 5000 INJECTION INTRAVENOUS; SUBCUTANEOUS at 05:09

## 2024-09-25 RX ADMIN — SUGAMMADEX 200 MG: 100 INJECTION, SOLUTION INTRAVENOUS at 03:09

## 2024-09-25 RX ADMIN — HYDROCODONE BITARTRATE AND ACETAMINOPHEN 2 TABLET: 5; 325 TABLET ORAL at 12:09

## 2024-09-25 RX ADMIN — ACETAMINOPHEN 650 MG: 325 TABLET ORAL at 12:09

## 2024-09-25 RX ADMIN — PIPERACILLIN SODIUM AND TAZOBACTAM SODIUM 4.5 G: 4; .5 INJECTION, POWDER, LYOPHILIZED, FOR SOLUTION INTRAVENOUS at 09:09

## 2024-09-25 RX ADMIN — FENTANYL CITRATE 50 MCG: 0.05 INJECTION, SOLUTION INTRAMUSCULAR; INTRAVENOUS at 12:09

## 2024-09-25 RX ADMIN — FENTANYL CITRATE 50 MCG: 0.05 INJECTION, SOLUTION INTRAMUSCULAR; INTRAVENOUS at 01:09

## 2024-09-25 RX ADMIN — VECURONIUM BROMIDE 2 MG: 1 INJECTION, POWDER, LYOPHILIZED, FOR SOLUTION INTRAVENOUS at 02:09

## 2024-09-25 RX ADMIN — LIDOCAINE HYDROCHLORIDE 50 MG: 20 INJECTION, SOLUTION INTRAVENOUS at 12:09

## 2024-09-25 RX ADMIN — FLUCONAZOLE 200 MG: 2 INJECTION, SOLUTION INTRAVENOUS at 09:09

## 2024-09-25 RX ADMIN — ROCURONIUM BROMIDE 5 MG: 10 INJECTION, SOLUTION INTRAVENOUS at 12:09

## 2024-09-25 RX ADMIN — SODIUM CHLORIDE: 0.9 INJECTION, SOLUTION INTRAVENOUS at 12:09

## 2024-09-25 RX ADMIN — IODIXANOL 100 ML: 320 INJECTION, SOLUTION INTRAVASCULAR at 09:09

## 2024-09-25 RX ADMIN — DAPTOMYCIN 1340 MG: 500 INJECTION, POWDER, LYOPHILIZED, FOR SOLUTION INTRAVENOUS at 10:09

## 2024-09-25 RX ADMIN — HYDRALAZINE HYDROCHLORIDE 10 MG: 20 INJECTION INTRAMUSCULAR; INTRAVENOUS at 04:09

## 2024-09-25 RX ADMIN — Medication 10 MG: at 02:09

## 2024-09-25 RX ADMIN — FAMOTIDINE 20 MG: 10 INJECTION, SOLUTION INTRAVENOUS at 03:09

## 2024-09-25 RX ADMIN — SODIUM CHLORIDE 250 ML: 9 INJECTION, SOLUTION INTRAVENOUS at 04:09

## 2024-09-25 RX ADMIN — PIPERACILLIN SODIUM AND TAZOBACTAM SODIUM 4.5 G: 4; .5 INJECTION, POWDER, LYOPHILIZED, FOR SOLUTION INTRAVENOUS at 05:09

## 2024-09-25 RX ADMIN — CLEVIPIDINE 21 MG/HR: 0.5 EMULSION INTRAVENOUS at 07:09

## 2024-09-25 RX ADMIN — HYDROMORPHONE HYDROCHLORIDE 1 MG: 1 INJECTION, SOLUTION INTRAMUSCULAR; INTRAVENOUS; SUBCUTANEOUS at 11:09

## 2024-09-25 RX ADMIN — CLEVIPIDINE 1 MG/HR: 0.5 EMULSION INTRAVENOUS at 04:09

## 2024-09-25 RX ADMIN — CLEVIPIDINE 15 MG/HR: 0.5 EMULSION INTRAVENOUS at 09:09

## 2024-09-25 RX ADMIN — PROPOFOL 100 MG: 10 INJECTION, EMULSION INTRAVENOUS at 12:09

## 2024-09-25 RX ADMIN — ACETAMINOPHEN 650 MG: 325 TABLET ORAL at 11:09

## 2024-09-25 RX ADMIN — HYDROMORPHONE HYDROCHLORIDE 1 MG: 1 INJECTION, SOLUTION INTRAMUSCULAR; INTRAVENOUS; SUBCUTANEOUS at 10:09

## 2024-09-25 RX ADMIN — ACETAMINOPHEN 1000 MG: 10 INJECTION INTRAVENOUS at 10:09

## 2024-09-25 RX ADMIN — MIDODRINE HYDROCHLORIDE 5 MG: 2.5 TABLET ORAL at 04:09

## 2024-09-25 RX ADMIN — MIDAZOLAM HYDROCHLORIDE 2 MG: 1 INJECTION, SOLUTION INTRAMUSCULAR; INTRAVENOUS at 12:09

## 2024-09-25 RX ADMIN — HYDROMORPHONE HYDROCHLORIDE 1 MG: 1 INJECTION, SOLUTION INTRAMUSCULAR; INTRAVENOUS; SUBCUTANEOUS at 04:09

## 2024-09-25 RX ADMIN — Medication 100 MG: at 12:09

## 2024-09-25 NOTE — ANESTHESIA PREPROCEDURE EVALUATION
09/25/2024  João Ham is a 44 y.o., male.      Patient Active Problem List   Diagnosis    ESRD (end stage renal disease)    Anemia of chronic disease    Persistent proteinuria    Severe obesity with body mass index (BMI) of 35.0 to 39.9 with comorbidity    Pulmonary hypertension    Hypertensive heart and renal disease with congestive heart failure    Hyperparathyroidism    Erectile dysfunction    Sleep apnea    Bacteremia    Essential hypertension    Patient on waiting list for kidney transplant    Inguinal hernia of left side with gangrene    Dialysis AV fistula malfunction       Past Surgical History:   Procedure Laterality Date    ABSCESS DRAINAGE Left 9/17/2024    Procedure: DRAINAGE, ABSCESS, GROIN;  Surgeon: Galileo Connors MD;  Location: [facility] OR;  Service: General;  Laterality: Left;    FISTULOGRAM Bilateral 4/30/2024    Procedure: Fistulogram;  Surgeon: Khoobehi, Ali, MD;  Location: [facility] CATH/EP LAB;  Service: Vascular;  Laterality: Bilateral;    HERNIA REPAIR Right     With mesh    INSERTION, CATHETER, TUNNELED N/A 6/22/2023    Procedure: Insertion,catheter,tunneled;  Surgeon: Everett Caicedo MD;  Location: [facility] OR;  Service: General;  Laterality: N/A;    PERCUTANEOUS TRANSLUMINAL ANGIOPLASTY OF ARTERIOVENOUS FISTULA Left 4/30/2024    Procedure: PTA, AV FISTULA;  Surgeon: Khoobehi, Ali, MD;  Location: [facility] CATH/EP LAB;  Service: Vascular;  Laterality: Left;    PHLEBOGRAPHY N/A 7/19/2024    Procedure: Venogram;  Surgeon: Khoobehi, Ali, MD;  Location: [facility] CATH/EP LAB;  Service: Vascular;  Laterality: N/A;    REMOVAL, TUNNELED CATH Right 7/19/2024    Procedure: REMOVAL, TUNNELED CATH;  Surgeon: Khoobehi, Ali, MD;  Location: [facility] CATH/EP LAB;  Service: Vascular;  Laterality: Right;    ROBOT-ASSISTED LAPAROSCOPIC RESECTION OF SIGMOID COLON USING DA DELANO XI N/A 9/17/2024    Procedure: XI ROBOTIC  SIGMOID RESECTION, WITH ANASTAMOSIS;  Surgeon: Galileo Connors MD;  Location: [facility];  Service: General;  Laterality: N/A;  possible open have instruments available        Tobacco Use:  The patient  reports that he has never smoked. He has never been exposed to tobacco smoke. He has never used smokeless tobacco.     Results for orders placed or performed during the hospital encounter of 09/17/24   EKG 12-lead    Collection Time: 09/17/24  8:57 AM   Result Value Ref Range    QRS Duration 92 ms    [facility] QTC Calculation 469 ms    Narrative    Test Reason : N18.6,    Vent. Rate : 119 BPM     Atrial Rate : 119 BPM     P-R Int : 134 ms          QRS Dur : 092 ms      QT Int : 334 ms       P-R-T Axes : 027 -78 004 degrees     QTc Int : 469 ms    Sinus tachycardia  Left axis deviation  Cannot rule out Anterior infarct ,age undetermined  Abnormal ECG  When compared with ECG of 15-JUL-2024 17:47,  T wave inversion now evident in Inferior leads  T wave inversion no longer evident in Lateral leads  Confirmed by Jose Cruz MD ([id number]) on 9/22/2024 1:02:21 PM    Referred By: BRANDON   SELF           Confirmed By:Jose Cruz MD        Imaging Results              X-Ray Chest AP Portable (Final result)  Result time 09/17/24 10:38:03      Final result by Everett Castellano Jr., MD (09/17/24 10:38:03)                   Impression:      No acute abnormality.      Electronically signed by: Everett Castellano MD  Date:    09/17/2024  Time:    10:38               Narrative:    EXAMINATION:  XR CHEST AP PORTABLE    CLINICAL HISTORY:  End stage renal disease    TECHNIQUE:  Single frontal view of the chest was performed.    COMPARISON:  Chest x-ray of July 15, 2024    FINDINGS:  The lungs are clear, with normal appearance of pulmonary vasculature and no pleural effusion or pneumothorax.    The cardiac silhouette is normal in size. The hilar and mediastinal contours are unremarkable.    Bones are intact.                                        CT Abdomen Pelvis  Without Contrast (Final result)  Result time 09/17/24 09:31:56      Final result by Everett Castellano Jr., MD (09/17/24 09:31:56)                   Impression:      Left inguinal hernia containing fat and air as well as marked inflammation extending from inside the pelvis, in the hernia and down into the left scrotum.  This is consistent with an infected inguinal hernia, possible gangrene.    Diverticulosis coli without CT evidence of diverticulitis.  2.4 cm cyst of the left kidney.  Small kidneys noted    These results were telephoned to Dr. Hurst, Emergency Department on 17 September 2024 at 09:30      Electronically signed by: vEerett Castellano MD  Date:    09/17/2024  Time:    09:31               Narrative:    EXAMINATION:  CT ABDOMEN PELVIS WITHOUT CONTRAST    CLINICAL HISTORY:  left inguinal pain, c/f hernia;    TECHNIQUE:  Low dose axial images, sagittal and coronal reformations were obtained from the lung bases to the pubic symphysis.    COMPARISON:  None    FINDINGS:  The liver is of normal size contour and CT density without focal defect.  The gallbladder is of normal size without CT evidence of stone.  The pancreas is of normal contour and CT density without edema or mass.  The spleen is small but of normal CT density.  A small accessory spleen is noted.    The adrenal glands are not enlarged.  The kidneys are small.  There is a low-density 2.4 cm probable cyst of the lateral surface of the midpole of the left kidney.  Stone or hydronephrosis is not seen on either side.  The abdominal aorta and inferior vena cava are of normal caliber.    The stomach is of normal configuration.  Small bowel dilatation or air-fluid levels are not seen.  There is diverticulosis of the descending and sigmoid colon without CT evidence of diverticulitis.  A normal appendix is noted.  Free fluid or free air in the peritoneum is not seen.    In the right groin there is significant inflammation  of a inguinal hernia containing fat and air.  There is also edema of the left scrotum and adenopathy in the left groin the largest node having a short axis 1.3 cm.  Similar finding is not seen on the right.                                       Lab Results   Component Value Date    WBC 17.03 (H) 09/25/2024    HGB 7.2 (L) 09/25/2024    HCT 22.2 (L) 09/25/2024    MCV 98 09/25/2024     09/25/2024     BMP  Lab Results   Component Value Date     09/25/2024    K 4.0 09/25/2024    CL 97 09/25/2024    CO2 25 09/25/2024    BUN 46 (H) 09/25/2024    CREATININE 13.0 (H) 09/25/2024    CALCIUM 8.9 09/25/2024    ANIONGAP 15 09/25/2024     (H) 09/25/2024     09/24/2024     (H) 09/23/2024       Results for orders placed during the hospital encounter of 07/15/24    Echo    Interpretation Summary    Limited echo only for EF and to look at valves due to bacteremia.    Left Ventricle: The left ventricle is normal in size. Increased wall thickness. There is concentric remodeling. There is normal systolic function with a visually estimated ejection fraction of 55 - 60%.    Aortic Valve: There is no mass/vegetation present.    Mitral Valve: There is no mass/vegetation present.    Tricuspid Valve: There is no mass/vegetation present.    Pulmonic Valve: There is no mass/vegetation present.              Pre-op Assessment    I have reviewed the Patient Summary Reports.     I have reviewed the Nursing Notes. I have reviewed the NPO Status.   I have reviewed the Medications.     Review of Systems  Anesthesia Hx:  No problems with previous Anesthesia             Denies Family Hx of Anesthesia complications.    Denies Personal Hx of Anesthesia complications.                    Social:  Non-Smoker       Hematology/Oncology:       -- Anemia:                                  Cardiovascular:     Hypertension, well controlled       CHF (EF 55-60%)                                 Pulmonary:        Sleep Apnea (on BiPAP  currently), CPAP           Education provided regarding risk of obstructive sleep apnea            Renal/:  Chronic Renal Disease (MWF dialysis, last dialysis on 9/24/24), ESRD                Hepatic/GI:  Hepatic/GI Normal       Recent hernia repair with bowel resection and repair  Possible abscess in abdomen and pelvis          Musculoskeletal:  Musculoskeletal Normal                Neurological:  Neurology Normal                                      Endocrine:  Endocrine Normal          Obesity / BMI > 30  Psych:  Psychiatric History  depression                Physical Exam  General: Well nourished, Cooperative, Alert and Oriented    Airway:  Mallampati: III / II  Mouth Opening: Normal  TM Distance: Normal  Tongue: Normal  Neck ROM: Normal ROM    Dental:  Intact    Chest/Lungs:  Clear to auscultation    Heart:  Rate: Normal  Rhythm: Regular Rhythm  Sounds: Normal    Abdomen:  Normal, Soft, Nontender        Anesthesia Plan  Type of Anesthesia, risks & benefits discussed:    Anesthesia Type: Gen ETT  Intra-op Monitoring Plan: Standard ASA Monitors  Post Op Pain Control Plan: multimodal analgesia and IV/PO Opioids PRN  Induction:  IV  Airway Plan: Video, Post-Induction  Informed Consent: Informed consent signed with the Patient and all parties understand the risks and agree with anesthesia plan.  All questions answered.   ASA Score: 3 Emergent  Anesthesia Plan Notes:   **CHART PREOP**  GETA  Poss Liberty  PIV x 2, poss TLC  Zofran Pepcid  No steroids please    Ready For Surgery From Anesthesia Perspective.     .

## 2024-09-25 NOTE — SUBJECTIVE & OBJECTIVE
Interval History:  Fistulogram planned by vascular surgery this morning.  CT showed findings of inguinal abscess/phlegmon with a possible pyocele.  Urology consulted.  Leukocytosis worsening.  Id following.  Surgery evaluating.      Review of Systems still has mild abdominal pain and distention.    Objective:     Vital Signs (Most Recent):  Temp: 99.6 °F (37.6 °C) (09/24/24 1615)  Pulse: 95 (09/24/24 1830)  Resp: 16 (09/24/24 1802)  BP: (!) 172/84 (09/24/24 1830)  SpO2: 96 % (09/24/24 1615) Vital Signs (24h Range):  Temp:  [98.5 °F (36.9 °C)-99.6 °F (37.6 °C)] 99.6 °F (37.6 °C)  Pulse:  [72-95] 95  Resp:  [16-22] 16  SpO2:  [92 %-99 %] 96 %  BP: (118-189)/() 172/84     Weight: 134 kg (295 lb 6.7 oz)  Body mass index is 37.93 kg/m².    Intake/Output Summary (Last 24 hours) at 9/24/2024 1909  Last data filed at 9/24/2024 1600  Gross per 24 hour   Intake 240 ml   Output --   Net 240 ml      Physical Exam  Vitals and nursing note reviewed.   Constitutional:       General: He is not in acute distress.     Appearance: He is obese. He is not toxic-appearing.   HENT:      Head: Atraumatic.      Mouth/Throat:      Mouth: Mucous membranes are moist.      Pharynx: Oropharynx is clear.   Eyes:      General: No scleral icterus.     Conjunctiva/sclera: Conjunctivae normal.      Pupils: Pupils are equal, round, and reactive to light.   Cardiovascular:      Rate and Rhythm: Regular rhythm.      Heart sounds: No murmur heard.  Pulmonary:      Effort: No respiratory distress.      Breath sounds: No wheezing, rhonchi or rales.   Abdominal:      General: Abdomen is flat. Bowel sounds are normal.  Abdominal tenderness still present, no collections that could be palpated around the inguinal region, mild distention present, no guarding, no rigidity.     Palpations: Abdomen is soft.   Genitourinary:     Comments: There is left groin dressing and also laparoscopic scars in the abdomen , has scrotal swelling, non-tender Peritesticular  region, inflammation present-as per urology exam  Musculoskeletal:         General: No swelling or deformity.      Cervical back: No rigidity or tenderness.   Left upper extremity with fistula in a bandage  Skin:     Coloration: Skin is not jaundiced or pale.      Findings: No bruising, erythema or rash.   Neurological:      General: No focal deficit present.      Mental Status: He is alert and oriented to person, place, and time.      Cranial Nerves: No cranial nerve deficit.      Sensory: No sensory deficit.      Motor: No weakness.   Psychiatric:         Mood and Affect: Mood normal.         Behavior: Behavior normal    Significant Labs: All pertinent labs within the past 24 hours have been reviewed.  CBC:   Recent Labs   Lab 09/23/24  0918 09/24/24  1122   WBC 17.64* 18.77*   HGB 8.0* 7.1*   HCT 25.4* 22.6*    384     CMP:   Recent Labs   Lab 09/23/24  0918 09/24/24  1122   * 136   K 4.0 4.2   CL 93* 93*   CO2 22* 22*   * 101   BUN 61* 75*   CREATININE 16.0* 17.7*   CALCIUM 9.1 8.8   PROT 7.5 7.3   ALBUMIN 3.2* 3.0*   BILITOT 0.5 0.5   ALKPHOS 114 113   AST 46* 37   ALT 26 31   ANIONGAP 17* 21*       Significant Imaging:     CT abdomen and pelvis without contrast:  Left inguinal hernia containing fat and air as well as marked inflammation extending from inside the pelvis, in the hernia and down into the left scrotum.  This is consistent with an infected inguinal hernia, possible gangrene.  Diverticulosis coli without CT evidence of diverticulitis.  2.4 cm cyst of the left kidney.  Small kidneys noted    CXR: No acute abnormality.     CXR:  Central line inserted ending in the superior vena cava. Hypoventilation.     Physical Exam

## 2024-09-25 NOTE — CARE UPDATE
09/25/24 1051   Patient Assessment/Suction   Level of Consciousness (AVPU) alert   Respiratory Effort Unlabored   Expansion/Accessory Muscles/Retractions no use of accessory muscles   All Lung Fields Breath Sounds clear   LLL Breath Sounds diminished   RLL Breath Sounds diminished   Rhythm/Pattern, Respiratory unlabored   PRE-TX-O2   Device (Oxygen Therapy) nasal cannula   Flow (L/min) (Oxygen Therapy) 5   SpO2 100 %   Pulse Oximetry Type Continuous   $ Pulse Oximetry - Multiple Charge Pulse Oximetry - Multiple   Pulse 81   Resp 19   Preset CPAP/BiPAP Settings   Mode Of Delivery BiPAP S/T;Standby   Education   $ Education Oxygen;Other (see comment);15 min  (abg)   Labs   $ Was an ABG obtained? Arterial Puncture   $ Labs Tech Time 15 min

## 2024-09-25 NOTE — PROGRESS NOTES
09/24/24 1945   Handoff Report   Received From Lake BAPTISTE RN   Given To Ruth PAREDES RN   Treatment Type   Treatment Type Maintenance   Vital Signs   Temp 99 °F (37.2 °C)   Temp Source Oral   Pulse 100   Heart Rate Source Monitor   Resp 18   SpO2 99 %   Flow (L/min) (Oxygen Therapy) 2   Device (Oxygen Therapy) nasal cannula   BP (!) 143/62   BP Method Automatic   Patient Position Lying   Assessments (Pre/Post)   Blood Liters Processed (BLP) 57.4   Transport Modality bed   Level of Consciousness (AVPU) alert   Dialyzer Clearance moderately streaked   Pain   Pain Rating (0-10): Rest 3        Hemodialysis AV Fistula Left forearm   No placement date or time found.   Present Prior to Hospital Arrival?: Yes  Size/Length: 17 G  Location: Left forearm   Needle Size 16ga   Site Assessment Clean;Dry;Intact;No redness;No swelling   Patency Present;Thrill;Bruit   Status Deaccessed   Flows Good   Dressing Intervention First dressing   Dressing Status Clean;Dry;Intact   Site Condition No complications   Dressing Gauze   Post-Hemodialysis Assessment   Rinseback Volume (mL) 250 mL   Blood Volume Processed (Liters) 57.4 L   Dialyzer Clearance Moderately streaked   Duration of Treatment 180 minutes   Additional Fluid Intake (mL) 500 mL   Total UF (mL) 3500 mL   Net Fluid Removal 300   Patient Response to Treatment pt miya tx   Post-Treatment Weight 139 kg (306 lb 7 oz)   Treatment Weight Change -3   Arterial bleeding stop time (min) 6 min   Venous bleeding stop time (min) 6 min   Post-Hemodialysis Comments pt stable   Edema   Edema generalized     Pt miya tx, LT AVF was cannulated using 16 ga needles without complications, 3000 ml net uf removed

## 2024-09-25 NOTE — TRANSFER OF CARE
"Anesthesia Transfer of Care Note    Patient: João Ham    Procedure(s) Performed: Procedure(s) (LRB):  LAPAROTOMY, EXPLORATORY (N/A)  COLON RESECTION  MOBILIZATION, SPLENIC FLEXURE    Patient location: ICU    Anesthesia Type: general    Transport from OR: Transported from OR on 6-10 L/min O2 by face mask with adequate spontaneous ventilation. Continuous ECG monitoring in transport. Continuous SpO2 monitoring in transport. Continuos invasive BP monitoring in transport    Post pain: adequate analgesia    Post assessment: no apparent anesthetic complications and tolerated procedure well    Post vital signs: stable    Level of consciousness: responds to stimulation    Nausea/Vomiting: no nausea/vomiting    Complications: none    Transfer of care protocol was followed      Last vitals: Visit Vitals  /82   Pulse 81   Temp (!) 38.3 °C (101 °F)   Resp (!) 23   Ht 6' 2" (1.88 m)   Wt 134 kg (295 lb 6.7 oz)   SpO2 100%   BMI 37.93 kg/m²     "

## 2024-09-25 NOTE — CONSULTS
Procedure Location:room 2204  Education/need:midline  Prep:chloraprep  Supplies:   Brand:BD   Gauge/ Length:18ga/10cm   Lot #:[id number]  Extremity:right upper  Vessel:cephalic  Attempts:1  Inserted by:Seun Brandon RN  Date/time placed:09/25/2024/1055  Tolerated:well  Dressing applied:CHG drsg applied

## 2024-09-25 NOTE — OP NOTE
Date of procedure: September 25, 2024   Staff surgeon: Dr. Galileo Connors       Assistant:Jordyn Junior RNFA    Preoperative diagnosis:  Anastomotic leak     Postop diagnosis: The same     Procedure:  1. Exploratory laparotomy   2. Colon resection end-colostomy  3. Mobilization of splenic flexure   4. left inguinal canal exploration     Anesthesia: General endotracheal anesthesia     Indication for procedure.  Following signing informed consent patient was taken the operating room placed supine position.  General endotracheal anesthesia was administered.  Sams catheter was inserted.  Abdomen is prepped and draped standard fashion.  Time-out procedure was performed.  Having performed time-out procedure make a generous midline incision extending from the lower abdomen to a point well above the umbilicus.  Dissection was carried down through the deep dermal tissue down to the fascia.  Fascia appears healthy and intact.  Sharp incision was made through the fascia in the abdominal cavity was entered.  Entering the abdominal cavity in the left lower quadrant there is obvious anastomotic disruption.  There is lorenzo stool in old blood noted in the left lower quadrant.  There is a rind with the omentum stuck over the anastomosis.  This is bluntly .  Visualization of the previous made isoperistaltic anastomosis demonstrates obvious disruption of the common channel.  I mobilize distally of the on the sigmoid colon a PICC a point to resect.  A staple across this with a contour stapler.  I then mobilized proximally and staple across the mid sigmoid colon with a JOSE stapler.  The dehisced segment was then sent off the field and sent to pathology.  I irrigated with copious amounts of warm fluid.  The small bowel was run evaluated for any other abnormality.  No no other abnormality is appreciated.  Patient was obese which does increase the difficulty of the case as well as the time of the case.  I do have to mobilized  along the white line of Toldt in the cephalad direction.  I continued my dissection down around the splenic colic ligament fully releasing the splenic flexure.  The transverse colon it was  from the omentum in the lesser sac was entered.  Vessels with the lesser sac were then taken with the LigaSure device fully releasing the flexure.  Having released the splenic flexure colon will easily reach for a end-colostomy.  I irrigated with copious amounts of warm saline.  I then turned my attention to the left groin.  On CT imaging there were findings of air in the soft tissue tracking along the fascia.  It was perceived that this was coming from the anastomotic disruption but do feel it important to evaluate the left inguinal canal.  These wounds are open left inguinal canal was visualized.  There is significant induration and phlegmon noted particularly in the spermatic cord.  There does not appear to be any lorenzo ischemia noted.  The fascia although indurated and inflamed does not appear dusky year with findings consistent with fasciitis.  Surgical colleagues did evaluate and agreed no evidence of fasciitis.  At this point decision was made to create the colostomy.  At a point just above the umbilicus circular incision was made.  Dissection was then carried down through the deep dermal tissue to the fascia.  The fascia was then divided in a vertical fashion in the underlying rectus muscle was bluntly  with Farzaneh clamp.  The posterior sheath was divided circumferentially.  Given thickening inflammation of the colon wide colostomy site was made.  It will easily fit 3 fingers.  Having made the colostomy site I bring the end colon through this uneventfully.  Seprafilm was wrapped around of the colon traveling through the abdominal wall.  I also placed Seprafilm into the abdominal cavity.  The fascia was then injected with Exparel mixed with Marcaine.  A drain was brought in through the left abdomen into  the pelvis.  Penrose drain was also placed in the left inguinal canal.  Having brought the colon through the abdominal wall I closed the abdominal fascia with a running heavy PDS suture.  Several interrupted 0 Vicryl sutures placed in the stay fashion.  It should be noted the fascia of the colostomy site appeared healthy with no evidence of necrosis or infection or inflammation.  Having closed the midline fascia closed the skin incision with 4-0 Monocryl.  Next I loosely closed the left inguinal incision with 2 nylon sutures.  Lastly sharply divide the colon that was brought through the abdominal wall.  I then mature colostomy with multiple 3-0 Vicryl sutures.  Patient was then extubated taken recovery room in stable condition.  There were no immediate complications blood loss was 150 cc

## 2024-09-25 NOTE — PROGRESS NOTES
[facility]   Department of Infectious Disease  Progress Note        PATIENT NAME: João Ham  MRN: [id number]  TODAY'S DATE: 09/25/2024  ADMIT DATE: 9/17/2024  LOS: 8 days    CHIEF COMPLAINT: Fatigue and Groin Swelling (X 2 days.  Hx of Kidney failure and CHF)      PRINCIPLE PROBLEM: Inguinal hernia of left side with gangrene    INTERVAL HISTORY      09/23/2024: Seen and evaluated at bedside.  No acute issues.  Leukocytosis persists.  Afebrile.      09/24/2024:  Leukocytosis.  Remains afebrile.  Fluid in BERNABE drain remains dark green.  Seen by vascular surgeon with plan for left upper extremity AV fistula fistulogram.    09/25/2024: Leukocytosis persists.  He is otherwise stable.  Left inguinal drainage fluid sent for Gram stain and culture.  G stain was negative.  Culture in progress.  He remains on broad-spectrum antimicrobials.  He was seen by urologist yesterday with plan for conservative management of left scrotal edema with orchitis.  Repeat CT this morning showed gas in the abdomen resident concern for hemorrhagic leak.     Antibiotics (From admission, onward)      Start     Stop Route Frequency Ordered    09/24/24 1800  piperacillin-tazobactam 4.5 g in dextrose 5 % 100 mL IVPB (ready to mix)         -- IV Every 12 hours (non-standard times) 09/24/24 1035    09/23/24 1800  DAPTOmycin (CUBICIN) 1,340 mg in 0.9% NaCl SolP 50 mL IVPB         -- IV Every 48 hours (non-standard times) 09/22/24 1758    09/22/24 1800  DAPTOmycin (CUBICIN) 1,340 mg in 0.9% NaCl SolP 50 mL IVPB         -- IV Once 09/22/24 1801    09/17/24 2100  mupirocin 2 % ointment         09/22/24 2059 Nasl 2 times daily 09/17/24 1845          Antifungals (From admission, onward)      Start     Stop Route Frequency Ordered    09/22/24 1930  fluconazole (DIFLUCAN) IVPB 200 mg         -- IV Every 24 hours (non-standard times) 09/22/24 1520           Antivirals (From admission, onward)      None            ASSESSMENT and PLAN      1.  Incarcerated left inguinal hernia with necrosis and left inguinal abscess.  Had I&D of abscess, partial sigmoid colectomy with primary anastomosis on 09/17/2024.  Developed leukocytosis 09/20/2024 which has worsened.  Antibiotics modified 09/22/2024.  CT abdomen and pelvis 09/22/2024 with foci of gas at the anastomotic site.  Repeat CT abdomen and pelvis 09/24/2024 showing fluid collection with gas concerning for anastomotic leak.  For exploratory laparotomy today.    2. Leukocytosis.  As above    3. Scrotal edema worse on the left.  Diagnosed with orchitis by urologist.  Consider possibility of scrotal I&D in OR same time as laparotomy     4. ESRD on hemodialysis Monday/ Wednesday/Friday.  He is on transplant list.  Currently has a nonfunctioning left upper extremity AV fistula.    RECOMMENDATIONS:    Continue current antibiotics   For laparotomy today  Follow drainage culture    Please send Epic secure chat with any questions.  Discussed with general surgeon, hospitalist, pulmonologist and nephrologist.      SUBJECTIVE      Antibiotics   Vancomycin: 09/17/2024-09/20/2024   Clindamycin:  09/07/2020 04/09/2020 2/24   Cefepime: 09/07/2024-09/22/2024   Daptomycin: 09/22/2024-  Fluconazole: 09/22/2024-  Zosyn: 09/22/2024-    Microbiology  Blood culture 09/07/2024: NGTD   Urine culture 09/19/2024: NGTD   Blood culture 09/24/2024: In progress    Review of Systems  Negative except as stated above in Interval History     OBJECTIVE   Temp:  [99 °F (37.2 °C)-103.5 °F (39.7 °C)] 100.1 °F (37.8 °C)  Pulse:  [] 80  Resp:  [16-29] 19  SpO2:  [92 %-99 %] 99 %  BP: ()/() 105/68  Temp:  [99 °F (37.2 °C)-103.5 °F (39.7 °C)]   Temp: 100.1 °F (37.8 °C) (09/25/24 0345)  Pulse: 80 (09/25/24 0740)  Resp: 19 (09/25/24 0732)  BP: 105/68 (09/25/24 0740)  SpO2: 99 % (09/25/24 0732)    Intake/Output Summary (Last 24 hours) at 9/25/2024 0810  Last data filed at 9/25/2024 0345  Gross per 24 hour   Intake 740 ml   Output 3500  ml   Net -2760 ml       Physical Exam  General:  Middle-aged man who is ill-looking.    Abdomen:  Distended, soft, nontender, BERNABE drain with dark fluid, query bilious versus odor.  Serosanguineous drainage from corrugated drains left groin incision.  Bowel sounds hypoactive.    Genitals:  From Edematous scrotum worse on the left.  Nontender  CVS: S1 and 2 heard, no murmurs appreciated   Respiratory: Clear to auscultation   Skin: No rash appreciated   Musculoskeletal system: No joint or bony abnormalities appreciated   CNS: No gross focal deficits  Psych: Good mood, normal affect.    VAD:  Left IJ Vas-Cath, PIV  ISOLATION:  None    WOUNDS:  Abdominal surgical wounds    Significant Labs: All pertinent labs within the past 24 hours have been reviewed.    CBC LAST 7 DAYS  Recent Labs   Lab 09/19/24  0500 09/20/24  0153 09/21/24  0558 09/22/24  0743 09/23/24  0918 09/24/24  1122 09/25/24  0236   WBC 10.77 13.02* 13.43* 16.59* 17.64* 18.77* 17.03*   RBC 2.01* 2.35* 2.50* 2.41* 2.61* 2.29* 2.26*   HGB 6.6* 7.3* 7.6* 7.6* 8.0* 7.1* 7.2*   HCT 20.7* 22.5* 24.4* 24.2* 25.4* 22.6* 22.2*   * 96 98 100* 97 99* 98   MCH 32.8* 31.1* 30.4 31.5* 30.7 31.0 31.9*   MCHC 31.9* 32.4 31.1* 31.4* 31.5* 31.4* 32.4   RDW 16.0* 20.7* 20.0* 20.0* 19.9* 20.4* 20.0*    239 249 232 357 384 389   MPV 10.1 10.3 10.7 11.7 10.5 10.3 10.3   GRAN 72.7  7.8* 76.0* 55.0 58.0 77.0* 70.0 81.0*   LYMPH 9.0*  1.0 7.0* 15.0* 7.0* 12.0*  CANCELED 5.0* 3.0*   MONO 15.8*  1.7* 5.0 14.0 10.0 2.0*  CANCELED 12.0 2.0*   BASO 0.03  --   --   --  CANCELED  --   --    NRBC 0 0 0 0 0 0 1*       CHEMISTRY LAST 7 DAYS  Recent Labs   Lab 09/20/24  0152 09/20/24  0153 09/21/24  0558 09/22/24  0743 09/23/24  0918 09/24/24  1122 09/25/24  0236   * 135* 134* 134* 132* 136 137   K 4.9 5.0 4.5 4.9 4.0 4.2 4.0   CL 94* 95 97 97 93* 93* 97   CO2 26 26 24 21* 22* 22* 25   ANIONGAP 13 14 13 16 17* 21* 15   BUN 42* 42* 38* 54* 61* 75* 46*   CREATININE 13.2*  "13.4* 11.6* 14.5* 16.0* 17.7* 13.0*    104 106 100 120* 101 136*   CALCIUM 8.2* 8.3* 8.5* 8.7 9.1 8.8 8.9   MG 1.8 1.8 1.9 2.1 2.2 2.3 2.2   ALBUMIN 3.2* 3.1* 3.1* 3.2* 3.2* 3.0* 3.1*   PROT 6.9 6.9 7.0 7.5 7.5 7.3 7.2   ALKPHOS 71 71 77 87 114 113 161*   ALT 14 14 11 13 26 31 48*   AST 26 26 22 33 46* 37 64*   BILITOT 0.6 0.6 0.5 0.4 0.5 0.5 0.6       Estimated Creatinine Clearance: 10.7 mL/min (A) (based on SCr of 13 mg/dL (H)).    INFLAMMATORY/PROCAL  LAST 7 DAYS  Recent Labs   Lab 09/20/24  0152 09/24/24  1514   PROCAL  --  13.932*   CRP >16.00* 35.70*     No results found for: "ESR"  CRP   Date Value Ref Range Status   09/24/2024 35.70 (H) <1.00 mg/dL Final     Comment:     CRP-Normal Application expected values:   <1.0        mg/dL   Normal Range  1.0 - 5.0  mg/dL   Indicates mild inflammation  5.0 - 10.0 mg/dL   Indicates severe inflammation  >10.0        mg/dL   Represents serious processes and   frequently         indicates the presence of a bacterial   infection.      09/20/2024 >16.00 (H) <1.00 mg/dL Final     Comment:     CRP-Normal Application expected values:   <1.0        mg/dL   Normal Range  1.0 - 5.0  mg/dL   Indicates mild inflammation  5.0 - 10.0 mg/dL   Indicates severe inflammation  >10.0        mg/dL   Represents serious processes and   frequently         indicates the presence of a bacterial   infection.      07/16/2024 >16.00 (H) <1.00 mg/dL Final     Comment:     CRP-Normal Application expected values:   <1.0        mg/dL   Normal Range  1.0 - 5.0  mg/dL   Indicates mild inflammation  5.0 - 10.0 mg/dL   Indicates severe inflammation  >10.0        mg/dL   Represents serious processes and   frequently         indicates the presence of a bacterial   infection.          PRIOR  MICROBIOLOGY:    Susceptibility data from last 90 days.  Collected Specimen Info Organism Ceftriaxone Clindamycin Erythromycin Oxacillin Penicillin Tetracycline Trimeth/Sulfa Vancomycin   09/17/24 Blood from " Peripheral, Antecubital, Right No growth after 5 days.           09/17/24 Blood from Peripheral, Antecubital, Right No growth after 5 days.           07/19/24 Catheter Tip, Tunneled Staphylococcus aureus  S  S  I  S  R  S  S  S   07/16/24 Blood from Peripheral, Forearm, Right Staphylococcus aureus           07/15/24 Blood from Peripheral, Antecubital, Right Staphylococcus aureus  S  S  S  S  R  S  S  S   07/15/24 Blood from Peripheral, Hand, Right Staphylococcus aureus               LAST 7 DAYS MICROBIOLOGY   Microbiology Results (last 7 days)       Procedure Component Value Units Date/Time    Blood culture [[id number]] Collected: 09/24/24 1513    Order Status: Completed Specimen: Blood Updated: 09/24/24 2158     Blood Culture, Routine No Growth to date    Narrative:      Collection has been rescheduled by [email or web address] at 09/23/2024 14:35 Reason:   Patient unavailable dialysis   Collection has been rescheduled by MYB at 09/23/2024 18:44 Reason:   Unable to collect  Collection has been rescheduled by CZB at 09/23/2024 19:02 Reason:   Unable to collect  Collection has been rescheduled by RE1 at 09/24/2024 09:43 Reason:   Spoke to the patient's nurse about unable to collect she is   contacting Lambert and the doctor  Collection has been rescheduled by [email or web address] at 09/23/2024 14:35 Reason:   Patient unavailable dialysis   Collection has been rescheduled by MYB at 09/23/2024 18:44 Reason:   Unable to collect  Collection has been rescheduled by CZB at 09/23/2024 19:02 Reason:   Unable to collect  Collection has been rescheduled by RE1 at 09/24/2024 09:43 Reason:   Spoke to the patient's nurse about unable to collect she is   contacting Lambert and the doctor    Blood culture [[id number]] Collected: 09/24/24 1122    Order Status: Completed Specimen: Blood Updated: 09/24/24 1917     Blood Culture, Routine No Growth to date    Narrative:      Collection has been rescheduled by [email or web address] at 09/23/2024 14:35 Reason:   Patient unavailable dialysis    Collection has been rescheduled by MYB at 09/23/2024 18:44 Reason:   Unable to collect  Collection has been rescheduled by CZB at 09/23/2024 19:02 Reason:   Unable to collect  Collection has been rescheduled by RE1 at 09/24/2024 09:43 Reason:   Spoke to the patient's nurse about unable to collect she is   contacting Lambert and the doctor  Collection has been rescheduled by [email or web address] at 09/23/2024 14:35 Reason:   Patient unavailable dialysis   Collection has been rescheduled by MYB at 09/23/2024 18:44 Reason:   Unable to collect  Collection has been rescheduled by CZB at 09/23/2024 19:02 Reason:   Unable to collect  Collection has been rescheduled by RE1 at 09/24/2024 09:43 Reason:   Spoke to the patient's nurse about unable to collect she is   contacting Lambert and the doctor    Gram stain [[id number]] Collected: 09/24/24 1535    Order Status: Completed Specimen: Incision site from Groin Updated: 09/24/24 1829     Gram Stain Result Few WBC's      No organisms seen    Narrative:      Left groin incision site drainage    Aerobic culture [[id number]] Collected: 09/24/24 1535    Order Status: Sent Specimen: Incision site from Groin Updated: 09/24/24 1553    Blood culture x two cultures. Draw prior to antibiotics. [[id number]] Collected: 09/17/24 0949    Order Status: Completed Specimen: Blood from Peripheral, Antecubital, Right Updated: 09/22/24 1632     Blood Culture, Routine No growth after 5 days.    Narrative:      Aerobic and anaerobic    Blood culture x two cultures. Draw prior to antibiotics. [[id number]] Collected: 09/17/24 0949    Order Status: Completed Specimen: Blood from Peripheral, Antecubital, Right Updated: 09/22/24 1632     Blood Culture, Routine No growth after 5 days.    Narrative:      Aerobic and anaerobic    Urine culture [[id number]] Collected: 09/19/24 1435    Order Status: Completed Specimen: Urine Updated: 09/22/24 0740     Urine Culture, Routine No growth    Narrative:      Specimen Source->Urine             CURRENT/PREVIOUS VISIT EKG  Results for orders placed or performed during the hospital encounter of 09/17/24   EKG 12-lead    Collection Time: 09/17/24  8:57 AM   Result Value Ref Range    QRS Duration 92 ms    [facility] QTC Calculation 469 ms    Narrative    Test Reason : N18.6,    Vent. Rate : 119 BPM     Atrial Rate : 119 BPM     P-R Int : 134 ms          QRS Dur : 092 ms      QT Int : 334 ms       P-R-T Axes : 027 -78 004 degrees     QTc Int : 469 ms    Sinus tachycardia  Left axis deviation  Cannot rule out Anterior infarct ,age undetermined  Abnormal ECG  When compared with ECG of 15-JUL-2024 17:47,  T wave inversion now evident in Inferior leads  T wave inversion no longer evident in Lateral leads  Confirmed by Jose Cruz MD ([id number]) on 9/22/2024 1:02:21 PM    Referred By: BRANDON   SELF           Confirmed By:Jose Cruz MD     Significant Imaging: I have reviewed all relevant and available imaging results/findings within the past 24 hours.    I spent a total of 55 minutes on the day of the visit.This includes face to face time and non-face to face time preparing to see the patient (eg, review of tests), obtaining and/or reviewing separately obtained history, documenting clinical information in the electronic or other health record, independently interpreting results and communicating results to the patient/family/caregiver, or care coordinator.    Kerwin Au MD  Date of Service: 09/25/2024      This note was created using Gust voice recognition software that occasionally misinterpreted phrases or words.

## 2024-09-25 NOTE — PROGRESS NOTES
Patient seen this morning early.  At that time he was eating breakfast with no nausea or vomiting.  He reported multiple bouts of flatus and had bowel movements yesterday.  Had not had a bowel movement yet today.  Patient did have some issues with hypotension overnight and had a elevated fever to 103.  Because this a repeat CT scan was ordered this morning.  This was done with oral contrast.  Today's repeat CT scan demonstrates findings consistent with a perforation of bowel most likely anastomotic leak from previous surgery last week.  Contrast makes it well past the anastomosis but there is some new intra-abdominal pneumoperitoneum presumptively from the anastomosis in an area containing both contrast and gas near the anastomosis.  There was also moderate soft tissue emphysema new compared to previous CT scan.  I suspect that this is air tracking from the anastomotic leak.    Given these new findings recommend proceeding with laparotomy.  We will also open inguinal incision and ensure no other significant abnormality is present.  If it was a small leak which can be controlled locally may proceed with anastomotic revision but suspect we will likely require a temporary colostomy/Aidan's procedure.  Risks of surgery have been discussed in detail with the patient.  We will proceed with surgery this afternoon

## 2024-09-25 NOTE — ASSESSMENT & PLAN NOTE
Anemia is likely due to chronic disease due to ESRD. Most recent hemoglobin and hematocrit are listed below.  Recent Labs     09/22/24  0743 09/23/24  0918 09/24/24  1122   HGB 7.6* 8.0* 7.1*   HCT 24.2* 25.4* 22.6*       Plan  - Monitor serial CBC: Daily  - s/p 2 unit of pRBC on 9/19/24 and 09/20/24

## 2024-09-25 NOTE — CARE UPDATE
Patient back from surgery with NRB mask on, switched patient to bipap until more awake. No distress noted.

## 2024-09-25 NOTE — PROGRESS NOTES
[facility]  Progress Note    Patient Name: João Ham  MRN: [id number]  Patient Class: IP- Inpatient   Admission Date: 9/17/2024  Length of Stay: 7 days  Attending Physician: Ag Reyes, *  Primary Care Provider: Dawson Granado MD        Subjective:     Principal Problem:Inguinal hernia of left side with gangrene        HPI:  Patient is a 44 year old male with history of ESDR on dialysis MWF, awaiting kidney transplant, HTN, presented to ED with 3 days history of left groin pain and swelling. States swelling comes every time he cough and is being painful. Patient has low grade fever 99s at home. He has been vomiting bilious fluid last 2-3 days. No diarrhea or abdominal pain.     In the ED CT abdomen showed Left inguinal hernia containing fat and air as well as marked inflammation extending from inside the pelvis, in the hernia and down into the left scrotum. This is consistent with an infected inguinal hernia, possible gangrene. WBC was 14, patient was tachycardic. General surgery was consulted and patient was admitted under hospitalist.     Overview/Hospital Course:  Patient is a 44 year old male with history of ESRD, was admitted with suspected inguinal hernia gangrene. General surgery was consulted and patient was taken to OR. Found to have colonic perforation due to mesh erosion with an abscess. Patient underwent sigmoid resection and drainage of the abscess. Patient was started on clindamycin, Vancomycin and cefepime. Nephrology was consulted for chronic dialysis. However patient's AVF was not functioning. General surgery consulted again and trialysis catheter being placed today. Patient is getting dialysis and transfusion during dialysis for low Hb 6.6 and being transferred to [facility] for vascular evaluation.  Patient does not want to be seen by Dr. Khoobehi from vascular surgery.  Attempting to transfer the patient to [facility] where AV  fistula declotting procedure can be performed, but given his underlying ongoing conditions/and with primary surgeon at [facility], there recommended to hold off transfer at this time as it is not an emergency and he already has a Trialysis catheter that is functioning.  While on broad-spectrum coverage, patient is spiking fevers and worsening leukocytosis, id consulted, discontinued clindamycin, vancomycin and cefepime-added daptomycin, Diflucan and Zosyn.  Repeated CT abdomen and pelvis that showed postop changes versus abscess/phlegmon. Reconsulting surgery.  Fistulogram 9/24.    Interval History:  Fistulogram planned by vascular surgery this morning.  CT showed findings of inguinal abscess/phlegmon with a possible pyocele.  Urology consulted.  Leukocytosis worsening.  Id following.  Surgery evaluating.      Review of Systems still has mild abdominal pain and distention.    Objective:     Vital Signs (Most Recent):  Temp: 99.6 °F (37.6 °C) (09/24/24 1615)  Pulse: 95 (09/24/24 1830)  Resp: 16 (09/24/24 1802)  BP: (!) 172/84 (09/24/24 1830)  SpO2: 96 % (09/24/24 1615) Vital Signs (24h Range):  Temp:  [98.5 °F (36.9 °C)-99.6 °F (37.6 °C)] 99.6 °F (37.6 °C)  Pulse:  [72-95] 95  Resp:  [16-22] 16  SpO2:  [92 %-99 %] 96 %  BP: (118-189)/() 172/84     Weight: 134 kg (295 lb 6.7 oz)  Body mass index is 37.93 kg/m².    Intake/Output Summary (Last 24 hours) at 9/24/2024 1909  Last data filed at 9/24/2024 1600  Gross per 24 hour   Intake 240 ml   Output --   Net 240 ml      Physical Exam  Vitals and nursing note reviewed.   Constitutional:       General: He is not in acute distress.     Appearance: He is obese. He is not toxic-appearing.   HENT:      Head: Atraumatic.      Mouth/Throat:      Mouth: Mucous membranes are moist.      Pharynx: Oropharynx is clear.   Eyes:      General: No scleral icterus.     Conjunctiva/sclera: Conjunctivae normal.      Pupils: Pupils are equal, round, and reactive to light.   Cardiovascular:       Rate and Rhythm: Regular rhythm.      Heart sounds: No murmur heard.  Pulmonary:      Effort: No respiratory distress.      Breath sounds: No wheezing, rhonchi or rales.   Abdominal:      General: Abdomen is flat. Bowel sounds are normal.  Abdominal tenderness still present, no collections that could be palpated around the inguinal region, mild distention present, no guarding, no rigidity.     Palpations: Abdomen is soft.   Genitourinary:     Comments: There is left groin dressing and also laparoscopic scars in the abdomen , has scrotal swelling, non-tender Peritesticular region, inflammation present-as per urology exam  Musculoskeletal:         General: No swelling or deformity.      Cervical back: No rigidity or tenderness.   Left upper extremity with fistula in a bandage  Skin:     Coloration: Skin is not jaundiced or pale.      Findings: No bruising, erythema or rash.   Neurological:      General: No focal deficit present.      Mental Status: He is alert and oriented to person, place, and time.      Cranial Nerves: No cranial nerve deficit.      Sensory: No sensory deficit.      Motor: No weakness.   Psychiatric:         Mood and Affect: Mood normal.         Behavior: Behavior normal    Significant Labs: All pertinent labs within the past 24 hours have been reviewed.  CBC:   Recent Labs   Lab 09/23/24  0918 09/24/24  1122   WBC 17.64* 18.77*   HGB 8.0* 7.1*   HCT 25.4* 22.6*    384     CMP:   Recent Labs   Lab 09/23/24  0918 09/24/24  1122   * 136   K 4.0 4.2   CL 93* 93*   CO2 22* 22*   * 101   BUN 61* 75*   CREATININE 16.0* 17.7*   CALCIUM 9.1 8.8   PROT 7.5 7.3   ALBUMIN 3.2* 3.0*   BILITOT 0.5 0.5   ALKPHOS 114 113   AST 46* 37   ALT 26 31   ANIONGAP 17* 21*       Significant Imaging:     CT abdomen and pelvis without contrast:  Left inguinal hernia containing fat and air as well as marked inflammation extending from inside the pelvis, in the hernia and down into the left scrotum.  This  is consistent with an infected inguinal hernia, possible gangrene.  Diverticulosis coli without CT evidence of diverticulitis.  2.4 cm cyst of the left kidney.  Small kidneys noted    CXR: No acute abnormality.     CXR:  Central line inserted ending in the superior vena cava. Hypoventilation.     Physical Exam    Assessment/Plan:      * Inguinal hernia of left side with gangrene  Colonic perforation with abscess  CT abdomen shows Left inguinal hernia containing fat and air as well as marked inflammation extending from inside the pelvis, in the hernia and down into the left scrotum. This is consistent with an infected inguinal hernia, possible gangrene.   Patient was taken to OR and found to have colonic perforation due to mesh erosion. Patient underwent sigmoid resection and I&D on 9/17    - diet per general surgery   - repeat episodes of fever   -worsening leukocytosis  -mild abdominal distention, abdominal pain-KUB does not show any signs of ileus/SBO  - blood cultures and OR culture no growth till date  - consulted ID   -recommended to discontinue vancomycin, clindamycin, cefepime   -started on daptomycin, Diflucan and Zosyn  -repeated CT abdomen and pelvis , showing postoperative versus abscess/phlegmon material versus contained perforation, abscess/phlegmon in the inguinal canal  -reconsulted surgery, mentioned expected postoperative changes      Dialysis AV fistula malfunction  Was unable to access AVF on 9/18  Trialysis catheter placed on 9/19  Vascular surgery evaluation for declotting of AV fistula.  Reconsult vascular surgery team in-house 9/23, holding of transferred to [facility] with his worsening underlying infection, and declotting being a nonemergent procedure when he already has a functioning dialysis catheter-as per vascular surgery at [facility]  Consulted Dr. Salvador, fistulogram planned for 9/24      Left-sided hydrocele/scrotal edema on CT  Left paratesticular pyocele on  ultrasound  -continue antibiotics as above  -urology evaluated, recommended to continue antibiotics for epididymo-orchitis and less likely there is abscess to drain   -monitor clinically      Essential hypertension  Chronic, controlled. Latest blood pressure and vitals reviewed-     Home meds for hypertension were reviewed and noted below.   Hypertension Medications               cloNIDine (CATAPRES) 0.3 MG tablet TAKE 1 TABLET BY MOUTH THREE TIMES DAILY    hydrALAZINE (APRESOLINE) 100 MG tablet Take 1 tablet (100 mg total) by mouth 3 (three) times daily.    isosorbide mononitrate (IMDUR) 120 MG 24 hr tablet Take 120 mg by mouth once daily.    metoprolol succinate (TOPROL-XL) 50 MG 24 hr tablet Take 150 mg by mouth once daily.    olmesartan (BENICAR) 40 MG tablet Take 1 tablet by mouth once daily            While in the hospital, will manage blood pressure as follows; Continue home antihypertensive regimen    Will utilize p.r.n. blood pressure medication only if patient's blood pressure greater than 140/90 and he develops symptoms such as worsening chest pain or shortness of breath.    Sleep apnea  CPAP per home settings       Pulmonary hypertension  Resume home meds       Anemia of chronic disease  Anemia is likely due to chronic disease due to ESRD. Most recent hemoglobin and hematocrit are listed below.  Recent Labs     09/22/24  0743 09/23/24  0918 09/24/24  1122   HGB 7.6* 8.0* 7.1*   HCT 24.2* 25.4* 22.6*       Plan  - Monitor serial CBC: Daily  - s/p 2 unit of pRBC on 9/19/24 and 09/20/24    ESRD (end stage renal disease)  Nephrology consulted for routine dialysis MWF, AVF is not functional and tiralysis catheter was placed yesterday.    Plan for AV fistula declotting.  Consulted vascular surgery a [facility], given the patient's acuity of condition, and declotting being non-emergency, they recommended to reach out to vascular surgery team at [facility] on week days starting tomorrow.  Reconsulted  vascular surgery Dr. Salvador , NPO for fistulogram 9/24  Renal dose medications   On HD      VTE Risk Mitigation (From admission, onward)           Ordered     heparin (porcine) injection 4,000 Units  As needed (PRN)         09/20/24 1544     heparin (porcine) injection 7,500 Units  Every 8 hours         09/17/24 1031     IP VTE HIGH RISK PATIENT  Once         09/17/24 1031     Place sequential compression device  Until discontinued         09/17/24 1031                    Discharge Planning   MARIA ISABEL: 9/27/2024     Code Status: Full Code   Is the patient medically ready for discharge?:     Reason for patient still in hospital (select all that apply): Treatment and Imaging  Discharge Plan A: Home with family                  Ag Reyes MD  Department of Hospital Medicine   [facility]

## 2024-09-25 NOTE — NURSING
MD notified regarding patient change in medical status. Report given patient recently returned from dialysis. Dialysis nurses reports 3L removed with NAD noted. Patient reports 10/10 abdominal pain. PRN pain medication given, see MAR. Patient reports increased SOB. Respirations 34. Patient axillary temp 103.5F. . /70.

## 2024-09-25 NOTE — PROGRESS NOTES
INPATIENT NEPHROLOGY Progress  [facility] NEPHROLOGY    João Ham  09/25/2024    Reason for consultation:  ESRD    Chief Complaint:   Chief Complaint   Patient presents with    Fatigue    Groin Swelling     X 2 days.  Hx of Kidney failure and CHF     History of Present Illness:  Per H and P    Patient is a 44 year old male with history of ESDR on dialysis MWF, awaiting kidney transplant, HTN, presented to ED with 3 days history of left groin pain and swelling. States swelling comes every time he cough and is being painful. Patient has low grade fever 99s at home. He has been vomiting bilious fluid last 2-3 days. No diarrhea or abdominal pain.      In the ED CT abdomen showed Left inguinal hernia containing fat and air as well as marked inflammation extending from inside the pelvis, in the hernia and down into the left scrotum. This is consistent with an infected inguinal hernia, possible gangrene. WBC was 14, patient was tachycardic. General surgery was consulted and patient was admitted under hospitalist.     9/19  avf nonfunctional.   No sob or chest pain.  Has post op pain.  AFVSS  9/20  afvss.  Pt doesn't want to see previous vascular surgeon who put his avf in.  No alternative available.  Transfer center called.  Has temporary trialysis catheter.  Patient seen on hemodialysis for uremic clearance and ultrafiltration.    9/21  2.5L off w/HD yesterday.  Tmax 101.8, pressures ok.  Lab results reviewed, Hgb low but better than yesterday.  C/o post op pain, no other complaints.  9/22 POD 5.  VSS, lab results reviewed.  Hgb 7.6, added epo to HD orders.  C/o gas pains, plans to move around more today.  Next HD tomorrow.  9/23 VSS. HD today. Transfuse with next HD as needed if Hgb stil low.  9/24 VSS. Appreciate input from Urology, Gen Surgery, Vascular Surgery, ID on this complex patient. Plan for fistulogram tomorrow. CXR yesterday shows trialysis line tip in appropriate cocation. Will attempt HD today since we  skipped yesterday due to nurse and patient concerns that the line may be malpositioned.  9/25 VSS. s/p cephalic vein dilation by Dr. Robledo yesterday, tolerated HD very well. Hold HD today. Hypotension this AM, low grade fever yesterday, WBC elevated, received IVF bolus and Midodrine. Continues to be infected per Surgery, we can remove trialysis line now that AVF is treated and usable... Consider ICU. Stopped hydralazine and olmesartan, decreased Clonidine to 0.1 BID, not sure what to do with Metoprolol 150mg and Hydralazine 120mg... He may be sob/hypoxic due to anemia, suggest 1 PRBC instead of IVF, since Hgb is 7.    Addendum @ 10:47 AM  Seen at bedside in ICU with Dr. Rodriguez and Dr. Au. Mother present as well. Reviewed imaging. Agree with plan for urgent ex lap, remove central line and place mid line, keep current abx, hold on transfusion and dialysis and reassess later. ABG and fresh set of cx now, re-evalaute later.    Addendum @ 4:36 PM  Discussed briefly with Dr. Connors, dialysis nurse Brant and with ICU nurse. Patient had colostomy placed due to anastomotic leak and had 2 PRBC given. Upon return to floor was hypertensive with SBP > 200 and NPO. Advised Cleviprex drip for now (earlier to day he was on max dose Clonidine, mad dose Hydralazine, max dose Olmesartan, 150mg Metoprolol-XL and 120mg of Imdur - none of which he can get due to NPO) and will do urgent HD with UF 2L or so as tolerated.    Plan of Care:    ESRD on HD MWF  --continue dialysis per routine  --renal dose medication per routine    Anemia of CKD  --erythropoiesis stimulating agent with renal replacement therapy  --transfuse 1 PRBC due to hypoxic respiratory failure    Secondary HPT  --renal diet  --continue binders with meals    Hypertension  Hypotension with low grade fevers ? sepsis  --uf with hd as needed  --continue sepsis therapy  --line can be removed  --give blood, careful with IVF    Hyperkalemia  --2k dialysate  --low k  diet    AVF malfunction fixed  --got temporary line, CXR on 9/23 confirms tip in typical location, unchanged.  --appreciate Vascular eval, s/p fistulogram and cephalic vein stenosis dilation on 9/23 by Dr. Polk.      Thank you for allowing us to participate in this patient's care. We will continue to follow.    Vital Signs:  Temp Readings from Last 3 Encounters:   09/25/24 (!) 101 °F (38.3 °C)   08/22/24 97.3 °F (36.3 °C) (Temporal)   07/30/24 98.6 °F (37 °C) (Oral)       Pulse Readings from Last 3 Encounters:   09/25/24 90   08/29/24 (!) 115   08/22/24 110       BP Readings from Last 3 Encounters:   09/25/24 138/82   08/29/24 99/68   08/22/24 (!) 137/91       Weight:  Wt Readings from Last 3 Encounters:   09/18/24 134 kg (295 lb 6.7 oz)   08/29/24 131.3 kg (289 lb 7.4 oz)   08/22/24 131 kg (288 lb 12.8 oz)       Past Medical & Surgical History:  Past Medical History:   Diagnosis Date    Allergy     Anemia     Anxiety     CHF (congestive heart failure)     Depression     High blood pressure with chronic kidney disease, stage 5 chronic kidney disease or end stage renal disease     Hypertension        Past Surgical History:   Procedure Laterality Date    ABSCESS DRAINAGE Left 9/17/2024    Procedure: DRAINAGE, ABSCESS, GROIN;  Surgeon: Galileo Connors MD;  [facility];  Service: General;  Laterality: Left;    FISTULOGRAM Bilateral 4/30/2024    Procedure: Fistulogram;  Surgeon: Khoobehi, Ali, MD;  Location: [facility] CATH/EP LAB;  Service: Vascular;  Laterality: Bilateral;    HERNIA REPAIR Right     With mesh    INSERTION, CATHETER, TUNNELED N/A 6/22/2023    Procedure: Insertion,catheter,tunneled;  Surgeon: Everett Caicedo MD;  Location: [facility] OR;  Service: General;  Laterality: N/A;    PERCUTANEOUS TRANSLUMINAL ANGIOPLASTY OF ARTERIOVENOUS FISTULA Left 4/30/2024    Procedure: PTA, AV FISTULA;  Surgeon: Khoobehi, Ali, MD;  Location: [facility] CATH/EP LAB;  Service: Vascular;  Laterality: Left;    PHLEBOGRAPHY N/A  7/19/2024    Procedure: Venogram;  Surgeon: Khoobehi, Ali, MD;  Location: [facility] CATH/EP LAB;  Service: Vascular;  Laterality: N/A;    REMOVAL, TUNNELED CATH Right 7/19/2024    Procedure: REMOVAL, TUNNELED CATH;  Surgeon: Khoobehi, Ali, MD;  Location: [facility] CATH/EP LAB;  Service: Vascular;  Laterality: Right;    ROBOT-ASSISTED LAPAROSCOPIC RESECTION OF SIGMOID COLON USING DA DELANO XI N/A 9/17/2024    Procedure: XI ROBOTIC SIGMOID RESECTION, WITH ANASTAMOSIS;  Surgeon: Galileo Connors MD;  Location: [facility] OR;  Service: General;  Laterality: N/A;  possible open have instruments available       Past Social History:  Social History     Socioeconomic History    Marital status: Single   Tobacco Use    Smoking status: Never     Passive exposure: Never    Smokeless tobacco: Never   Substance and Sexual Activity    Alcohol use: Never    Drug use: Never    Sexual activity: Not Currently   Social History Narrative    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     Social Determinants of Health     Financial Resource Strain: Low Risk  (9/18/2024)    Overall Financial Resource Strain (CARDIA)     Difficulty of Paying Living Expenses: Not very hard   Food Insecurity: No Food Insecurity (9/18/2024)    Hunger Vital Sign     Worried About Running Out of Food in the Last Year: Never true     Ran Out of Food in the Last Year: Never true   Transportation Needs: No Transportation Needs (9/18/2024)    TRANSPORTATION NEEDS     Transportation : No   Physical Activity: Sufficiently Active (1/3/2023)    Exercise Vital Sign     Days of Exercise per Week: 6 days     Minutes of Exercise per Session: 60 min   Recent Concern: Physical Activity - Insufficiently Active (10/11/2022)    Exercise Vital Sign     Days of Exercise per Week: 3 days     Minutes of Exercise per Session: 30 min   Stress: Stress Concern Present (9/18/2024)    [employer] - Occupational Stress Questionnaire     Feeling of Stress : Very much   Housing Stability: Low  Risk  (9/18/2024)    Housing Stability Vital Sign     Unable to Pay for Housing in the Last Year: No     Homeless in the Last Year: No       Medications:  No current facility-administered medications on file prior to encounter.     Current Outpatient Medications on File Prior to Encounter   Medication Sig Dispense Refill    apixaban (ELIQUIS) 2.5 mg Tab Take 2.5 mg by mouth 2 (two) times daily.      calcitRIOL (ROCALTROL) 0.25 MCG Cap Take 1 capsule by mouth once daily (Patient taking differently: Take 0.25 mcg by mouth once daily.) 90 capsule 1    cholecalciferol, vitamin D3, 125 mcg (5,000 unit) Tab Take 1 tablet by mouth once daily.      cloNIDine (CATAPRES) 0.3 MG tablet TAKE 1 TABLET BY MOUTH THREE TIMES DAILY 270 tablet 2    isosorbide mononitrate (IMDUR) 120 MG 24 hr tablet Take 120 mg by mouth once daily.      metoprolol succinate (TOPROL-XL) 50 MG 24 hr tablet Take 150 mg by mouth once daily.      olmesartan (BENICAR) 40 MG tablet Take 1 tablet by mouth once daily 90 tablet 0    sevelamer carbonate (RENVELA) 800 mg Tab Take 2 tablets (1,600 mg total) by mouth 3 (three) times daily with meals. 180 tablet 11    calcium carbonate (TUMS) 200 mg calcium (500 mg) chewable tablet Take 2 tablets (1,000 mg total) by mouth 3 (three) times daily with meals. (Patient taking differently: Take 1,000 mg by mouth 3 (three) times daily.) 180 tablet 11    hydrALAZINE (APRESOLINE) 100 MG tablet Take 1 tablet (100 mg total) by mouth 3 (three) times daily. 90 tablet 11     Scheduled Meds:   DAPTOmycin (CUBICIN) IV (PEDS and ADULTS)  10 mg/kg Intravenous Q48H    DAPTOmycin (CUBICIN) IV (PEDS and ADULTS)  10 mg/kg Intravenous Once    epoetin mily-epbx  10,000 Units Intravenous Every Mon, Wed, Fri    fluconazole (DIFLUCAN) IV (PEDS and ADULTS)  200 mg Intravenous Q24H    gabapentin  200 mg Oral BID    heparin (porcine)  7,500 Units Subcutaneous Q8H    hydrALAZINE        isosorbide mononitrate  120 mg Oral Daily    metoprolol  succinate  150 mg Oral Daily    piperacillin-tazobactam (Zosyn) IV (PEDS and ADULTS) (extended infusion is not appropriate)  4.5 g Intravenous Q12H    polyethylene glycol  17 g Oral Daily    senna-docusate 8.6-50 mg  1 tablet Oral Daily     Continuous Infusions:   clevidipine  0-21 mg/hr Intravenous Continuous 2 mL/hr at 09/25/24 1634 1 mg/hr at 09/25/24 1634       PRN Meds:.  Current Facility-Administered Medications:     0.9% NaCl, , Intravenous, PRN    acetaminophen, 650 mg, Oral, Q8H PRN    acetaminophen, 650 mg, Oral, Q4H PRN    aluminum-magnesium hydroxide-simethicone, 30 mL, Oral, QID PRN    dextrose 10%, 12.5 g, Intravenous, PRN    dextrose 10%, 12.5 g, Intravenous, PRN    dextrose 10%, 12.5 g, Intravenous, PRN    dextrose 10%, 25 g, Intravenous, PRN    dextrose 10%, 25 g, Intravenous, PRN    dextrose 10%, 25 g, Intravenous, PRN    glucagon (human recombinant), 1 mg, Intramuscular, PRN    glucose, 16 g, Oral, PRN    glucose, 24 g, Oral, PRN    heparin (porcine), 4,000 Units, Intravenous, PRN    hydrALAZINE, , ,     HYDROcodone-acetaminophen, 2 tablet, Oral, Q4H PRN    HYDROmorphone, 1 mg, Intravenous, Q4H PRN    insulin aspart U-100, 0-5 Units, Subcutaneous, QID (AC + HS) PRN    iohexoL, 100 mL, Intravenous, ONCE PRN    magnesium oxide, 800 mg, Oral, PRN    magnesium oxide, 800 mg, Oral, PRN    melatonin, 6 mg, Oral, Nightly PRN    naloxone, 0.02 mg, Intravenous, PRN    ondansetron, 4 mg, Intravenous, Q6H PRN    potassium bicarbonate, 35 mEq, Oral, PRN    potassium bicarbonate, 50 mEq, Oral, PRN    potassium bicarbonate, 60 mEq, Oral, PRN    potassium, sodium phosphates, 2 packet, Oral, PRN    potassium, sodium phosphates, 2 packet, Oral, PRN    potassium, sodium phosphates, 2 packet, Oral, PRN    sodium chloride 0.9%, 250 mL, Intravenous, PRN    sodium chloride 0.9%, 3 mL, Intravenous, Q12H PRN    Allergies:  Mushroom    Past Family History:  Reviewed; refer to Hospitalist Admission Note    Review of  "Systems:  Review of Systems - All 14 systems reviewed and negative, except as noted in HPI    Physical Exam:    /82   Pulse 90   Temp (!) 101 °F (38.3 °C)   Resp (!) 22   Ht 6' 2" (1.88 m)   Wt 134 kg (295 lb 6.7 oz)   SpO2 100%   BMI 37.93 kg/m²     General Appearance:    Alert, cooperative, no distress, appears stated age   Head:    Normocephalic, without obvious abnormality, atraumatic   Eyes:    PER, conjunctiva/corneas clear, EOM's intact in both eyes        Throat:   Lips, mucosa, and tongue normal; teeth and gums normal   Back:     Symmetric, no curvature, ROM normal, no CVA tenderness   Lungs:     Clear to auscultation bilaterally, respirations unlabored   Chest wall:    No tenderness or deformity   Heart:    Regular rate and rhythm, S1 and S2 normal, no murmur, rub   or gallop   Abdomen:     Soft, non-tender, bowel sounds active all four quadrants,     no masses, no organomegaly   Extremities:   Extremities normal, atraumatic, no cyanosis or edema   Pulses:   2+ and symmetric all extremities   MSK:   No joint or muscle swelling, tenderness or deformity   Skin:   Skin color, texture, turgor normal, no rashes or lesions   Neurologic:   CNII-XII intact, normal strength and sensation       Throughout.  No flap     Results:  Lab Results   Component Value Date     09/25/2024    K 4.0 09/25/2024    CL 97 09/25/2024    CO2 25 09/25/2024    BUN 46 (H) 09/25/2024    CREATININE 13.0 (H) 09/25/2024    CALCIUM 8.9 09/25/2024    ANIONGAP 15 09/25/2024       Lab Results   Component Value Date    CALCIUM 8.9 09/25/2024    PHOS 5.2 (H) 09/20/2024     Recent Labs   Lab 09/25/24  0236   WBC 17.03*   RBC 2.26*   HGB 7.2*   HCT 22.2*      MCV 98   MCH 31.9*   MCHC 32.4     Jeremy Madrid MD    [facility] [place]  [phone]     "

## 2024-09-25 NOTE — CARE UPDATE
09/24/24 2330   Patient Assessment/Suction   Level of Consciousness (AVPU) alert   Respiratory Effort Normal;Unlabored   PRE-TX-O2   Device (Oxygen Therapy) BIPAP   $ Is the patient on High Flow Oxygen? Yes   Oxygen Concentration (%) 40   SpO2 95 %   Pulse Oximetry Type Intermittent   $ Pulse Oximetry - Multiple Charge Pulse Oximetry - Multiple   Ready to Wean/Extubation Screen   FIO2<=50 (chart decimal) 0.4   Preset CPAP/BiPAP Settings   Mode Of Delivery BiPAP S/T   $ Initial CPAP/BiPAP Setup? No   $ Is patient using? Yes   Size of Mask Large   Sized Appropriately? Yes   Equipment Type V60   Humidifier not applicable   Ipap 10   EPAP (cm H2O) 5   Pressure Support (cm H2O) 5   Set Rate (Breaths/Min) 12   ITime (sec) 1   Rise Time (sec) 3   Patient CPAP/BiPAP Settings   CPAP/BIPAP ID 10   Timed Inspiration (Sec) 1   IPAP Rise Time (sec) 3   RR Total (Breaths/Min) 22   Tidal Volume (mL) 979   VE Minute Ventilation (L/min) 21.3 L/min   Peak Inspiratory Pressure (cm H2O) 13   TiTOT (%) 31   Total Leak (L/Min) 2   Patient Trigger - ST Mode Only (%) 100   CPAP/BiPAP Alarms   High Pressure (cm H2O) 40   Low Pressure (cm H2O) 5   High RR (breaths/min) 45   Low RR (breaths/min) 8   Apnea (Sec) 20   Education   $ Education BiPAP;15 min

## 2024-09-25 NOTE — ASSESSMENT & PLAN NOTE
Colonic perforation with abscess  CT abdomen shows Left inguinal hernia containing fat and air as well as marked inflammation extending from inside the pelvis, in the hernia and down into the left scrotum. This is consistent with an infected inguinal hernia, possible gangrene.   Patient was taken to OR and found to have colonic perforation due to mesh erosion. Patient underwent sigmoid resection and I&D on 9/17    - diet per general surgery   - repeat episodes of fever   -worsening leukocytosis  -mild abdominal distention, abdominal pain-KUB does not show any signs of ileus/SBO  - blood cultures and OR culture no growth till date  - consulted ID   -recommended to discontinue vancomycin, clindamycin, cefepime   -started on daptomycin, Diflucan and Zosyn  -repeated CT abdomen and pelvis , showing postoperative versus abscess/phlegmon material versus contained perforation  -reconsulted surgery, mentioned expected postoperative changes

## 2024-09-25 NOTE — ANESTHESIA PROCEDURE NOTES
Intubation    Date/Time: 9/25/2024 12:52 PM    Performed by: Sharla Bae  Authorized by: Stefan Stewart MD    Intubation:     Induction:  Intravenous    Intubated:  Postinduction    Mask Ventilation:  Easy mask    Attempts:  1    Attempted By:  CRNA    Method of Intubation:  Video laryngoscopy    Blade:  Foster 3    Laryngeal View Grade: Grade I - full view of cords      Difficult Airway Encountered?: No      Complications:  None    Airway Device:  Oral endotracheal tube    Airway Device Size:  7.5    Style/Cuff Inflation:  Cuffed    Inflation Amount (mL):  8    Tube secured:  23    Secured at:  The teeth    Placement Verified By:  Capnometry and Fiber optic visualization    Complicating Factors:  None    Findings Post-Intubation:  BS equal bilateral and atraumatic/condition of teeth unchanged

## 2024-09-25 NOTE — BRIEF OP NOTE
[facility]  Brief Operative Note    SUMMARY     Surgery Date: 9/25/2024     Surgeons and Role:     * Galileo Connors MD - Primary    Assisting Surgeon: Jordyn POON    Pre-op Diagnosis:  Anastomotic leak    Post-op Diagnosis:  Same      Procedure(s) (LRB):  LAPAROTOMY, EXPLORATORY (N/A)  Colon resection with end colostomy  Mobilization of splenic flexure  Left inguinal exploration      Anesthesia: General    Implants:  Implant Name Type Inv. Item Serial No.  Lot No. LRB No. Used Action   MEMBRANE SEPRAFILM 5 X 6 - [id number]  MEMBRANE SEPRAFILM 5 X 6  Tribunat [id number] N/A 2 Implanted       Operative Findings: Disruption of closure of common channel of the colocolostomy    Estimated Blood Loss: 200 mL    Estimated Blood Loss has been documented.         Specimens:   Specimen (24h ago, onward)       Start     Ordered    09/25/24 1447  Specimen to Pathology - Surgery  Once        Comments: Pre-op Diagnosis: Inguinal hernia of left side with gangrene [K40.40]Procedure(s):LAPAROTOMY, EXPLORATORY Number of specimens: 1Name of specimens: 1. Colon     Question:  Release to patient  Answer:  Immediate    09/25/24 7918                    [id number]

## 2024-09-25 NOTE — PLAN OF CARE
Patient transferred to ICU this AM for higher level of care - CM following     09/25/24 1346   Discharge Reassessment   Assessment Type Discharge Planning Reassessment   Did the patient's condition or plan change since previous assessment? Yes

## 2024-09-25 NOTE — ASSESSMENT & PLAN NOTE
Was unable to access AVF on 9/18  Trialysis catheter placed on 9/19  Vascular surgery evaluation for declotting of AV fistula.  Reconsult vascular surgery team in-house 9/23, holding of transferred to [facility] with his worsening underlying infection, and declotting being a nonemergent procedure when he already has a functioning dialysis catheter-as per vascular surgery at [facility]  Consulted Dr. Salvador, fistulogram planned for 9/24

## 2024-09-25 NOTE — CARE UPDATE
09/25/24 0732   Patient Assessment/Suction   Level of Consciousness (AVPU) alert   Respiratory Effort Unlabored;Normal   Expansion/Accessory Muscles/Retractions no use of accessory muscles;no retractions;expansion symmetric   All Lung Fields Breath Sounds clear   Rhythm/Pattern, Respiratory unlabored;pattern regular;depth regular;chest wiggle adequate;no shortness of breath reported   Cough Frequency no cough   Skin Integrity   $ Wound Care Tech Time 15 min   Area Observed Bridge of nose   Skin Appearance without discoloration   PRE-TX-O2   Device (Oxygen Therapy) BIPAP   $ Is the patient on Low Flow Oxygen? Yes   $ Is the patient on High Flow Oxygen? Yes   Flow (L/min) (Oxygen Therapy) 6   Oxygen Concentration (%) 40   SpO2 99 %   Pulse Oximetry Type Intermittent   $ Pulse Oximetry - Multiple Charge Pulse Oximetry - Multiple   Pulse 89   Resp 19   Incentive Spirometer   $ Incentive Spirometer Charges done with encouragement   Incentive Spirometer Predicted Level (mL) 2500   Administration (IS) self-administered   Number of Repetitions (IS) 5   Level Incentive Spirometer (mL) 3000   Patient Tolerance (IS) good;no adverse signs/symptoms present   Preset CPAP/BiPAP Settings   Mode Of Delivery Standby;BiPAP S/T   $ CPAP/BiPAP Daily Charge BiPAP/CPAP Daily   Equipment Type V60   Education   $ Education BiPAP;15 min

## 2024-09-25 NOTE — ASSESSMENT & PLAN NOTE
Nephrology consulted for routine dialysis MWF, AVF is not functional and tiralysis catheter was placed yesterday.    Plan for AV fistula declotting.  Consulted vascular surgery a [facility], given the patient's acuity of condition, and declotting being non-emergency, they recommended to reach out to vascular surgery team at [facility] on week days starting tomorrow.  Reconsulted vascular surgery Dr. Salvador , ANGELIO for fistulogram 9/24  Renal dose medications   On HD

## 2024-09-25 NOTE — CONSULTS
Pulmonary/Critical Care Consult      PATIENT NAME: João Ham  MRN: [id number]  TODAY'S DATE: 2024  10:56 AM  ADMIT DATE: 2024  AGE: 44 y.o. : 1980    CONSULT REQUESTED BY: Ag Reyes, *    REASON FOR CONSULT:   Severe sepsis    HPI:  The patient is a 44-year-old end-stage renal patient who had surgery a week ago for perforated sigmoid colon secondary to mesh with perforation into the inguinal canal.  The patient began running a fever last night.  Today he is still febrile in his blood pressure is getting softer.  The patient has significant discomfort in his lower abdomen bilaterally.  Scrotum is quite firm.  CT shows air in the abdomen with subcutaneous air as well.  Dr. Connors is planning to take him back to surgery today.  He was seen by Dr. Lopez who does not feel any intervention is necessary for his scrotum.    REVIEW OF SYSTEMS  GENERAL: Feeling very uncomfortable  EYES: Vision is good.  ENT: No sinusitis or pharyngitis.   HEART: No chest pain or palpitations.  LUNGS: No cough, sputum, or wheezing.  GI:  His lower abdomen hurts all the way across.  :  He denies scrotal pain.  SKIN: No lesions or rashes.  MUSCULOSKELETAL: No joint pain or myalgias.  NEURO: No headaches or neuropathy.  LYMPH: No edema or adenopathy.  PSYCH: No anxiety or depression.  ENDO: No weight change.    ALLERGIES  Review of patient's allergies indicates:   Allergen Reactions    Mushroom Swelling     Tongue swells    Tongue swells       Tongue swells       INPATIENT SCHEDULED MEDICATIONS   bisacodyL  5 mg Oral QHS    cloNIDine  0.1 mg Oral BID    DAPTOmycin (CUBICIN) IV (PEDS and ADULTS)  10 mg/kg Intravenous Q48H    DAPTOmycin (CUBICIN) IV (PEDS and ADULTS)  10 mg/kg Intravenous Once    epoetin mily-epbx  10,000 Units Intravenous Every Mon, Wed, Fri    fluconazole (DIFLUCAN) IV (PEDS and ADULTS)  200 mg Intravenous Q24H    gabapentin  200 mg Oral BID    heparin (porcine)  7,500 Units Subcutaneous Q8H     isosorbide mononitrate  120 mg Oral Daily    metoprolol succinate  150 mg Oral Daily    piperacillin-tazobactam (Zosyn) IV (PEDS and ADULTS) (extended infusion is not appropriate)  4.5 g Intravenous Q12H    polyethylene glycol  17 g Oral Daily    senna-docusate 8.6-50 mg  1 tablet Oral Daily    sevelamer carbonate  1,600 mg Oral TID WM         MEDICAL AND SURGICAL HISTORY  Past Medical History:   Diagnosis Date    Allergy     Anemia     Anxiety     CHF (congestive heart failure)     Depression     High blood pressure with chronic kidney disease, stage 5 chronic kidney disease or end stage renal disease     Hypertension      Past Surgical History:   Procedure Laterality Date    ABSCESS DRAINAGE Left 9/17/2024    Procedure: DRAINAGE, ABSCESS, GROIN;  Surgeon: Galileo Connors MD;  Location: [facility] OR;  Service: General;  Laterality: Left;    FISTULOGRAM Bilateral 4/30/2024    Procedure: Fistulogram;  Surgeon: Khoobehi, Ali, MD;  Location: [facility] CATH/EP LAB;  Service: Vascular;  Laterality: Bilateral;    HERNIA REPAIR Right     With mesh    INSERTION, CATHETER, TUNNELED N/A 6/22/2023    Procedure: Insertion,catheter,tunneled;  Surgeon: Everett Caicedo MD;  Location: [facility] OR;  Service: General;  Laterality: N/A;    PERCUTANEOUS TRANSLUMINAL ANGIOPLASTY OF ARTERIOVENOUS FISTULA Left 4/30/2024    Procedure: PTA, AV FISTULA;  Surgeon: Khoobehi, Ali, MD;  Location: [facility] CATH/EP LAB;  Service: Vascular;  Laterality: Left;    PHLEBOGRAPHY N/A 7/19/2024    Procedure: Venogram;  Surgeon: Khoobehi, Ali, MD;  Location: [facility] CATH/EP LAB;  Service: Vascular;  Laterality: N/A;    REMOVAL, TUNNELED CATH Right 7/19/2024    Procedure: REMOVAL, TUNNELED CATH;  Surgeon: Khoobehi, Ali, MD;  Location: [facility] CATH/EP LAB;  Service: Vascular;  Laterality: Right;    ROBOT-ASSISTED LAPAROSCOPIC RESECTION OF SIGMOID COLON USING DA DELANO XI N/A 9/17/2024    Procedure: XI ROBOTIC SIGMOID RESECTION, WITH ANASTAMOSIS;  Surgeon: Galileo Connors,  MD;  Location: [facility] OR;  Service: General;  Laterality: N/A;  possible open have instruments available       ALCOHOL, TOBACCO AND DRUG USE  Social History     Tobacco Use   Smoking Status Never    Passive exposure: Never   Smokeless Tobacco Never     Social History     Substance and Sexual Activity   Alcohol Use Never     Social History     Substance and Sexual Activity   Drug Use Never       FAMILY HISTORY  No family history on file.    VITAL SIGNS (MOST RECENT)  Temp: 99.1 °F (37.3 °C) (09/25/24 0732)  Pulse: 80 (09/25/24 0740)  Resp: 19 (09/25/24 0732)  BP: 105/68 (09/25/24 0740)  SpO2: 99 % (09/25/24 0732)    INTAKE AND OUTPUT (LAST 24 HOURS):  Intake/Output Summary (Last 24 hours) at 9/25/2024 1056  Last data filed at 9/25/2024 0815  Gross per 24 hour   Intake 980 ml   Output 3500 ml   Net -2520 ml       WEIGHT  Wt Readings from Last 1 Encounters:   09/18/24 134 kg (295 lb 6.7 oz)       PHYSICAL EXAM  GENERAL:  Mid aged patient in no distress.  HEENT: Pupils equal and reactive. Extraocular movements intact. Nose intact. Pharynx moist.  NECK: Supple.   HEART: Regular rate and rhythm. No murmur or gallop auscultated.  LUNGS: Clear to auscultation and percussion. Lung excursion symmetrical. No change in fremitus. No adventitial noises.  ABDOMEN: Bowel sounds present.  His abdomen is tender from the umbilicus down bilaterally but worst in the bases.  The BERNABE drain is draining dirty dishwater.    : Normal anatomy.  He has 2 yoon in his scrotum which are draining pus.  Scrotum is very firm to touch.  It is neither erythematous nor tender  EXTREMITIES: Normal muscle tone and joint movement, no cyanosis or clubbing.   LYMPHATICS: No adenopathy palpated, no edema.  SKIN: Dry, intact, no lesions.   NEURO: Cranial nerves II-XII intact. Motor strength 5/5 bilaterally, upper and lower extremities.  PSYCH: Appropriate affect    ACUTE PHASE REACTANT (LAST 24 HOURS)  Recent Labs   Lab 09/24/24  1514   CRP 35.70*       CBC  LAST (LAST 24 HOURS)  Recent Labs   Lab 09/25/24  0236   WBC 17.03*   RBC 2.26*   HGB 7.2*   HCT 22.2*   MCV 98   MCH 31.9*   MCHC 32.4   RDW 20.0*      MPV 10.3   GRAN 81.0*   LYMPH 3.0*   MONO 2.0*   NRBC 1*       CHEMISTRY LAST (LAST 24 HOURS)  Recent Labs   Lab 09/25/24  0236      K 4.0   CL 97   CO2 25   ANIONGAP 15   BUN 46*   CREATININE 13.0*   *   CALCIUM 8.9   MG 2.2   ALBUMIN 3.1*   PROT 7.2   ALKPHOS 161*   ALT 48*   AST 64*   BILITOT 0.6         CARDIAC PROFILE (LAST 24 HOURS)  Recent Labs   Lab 09/20/24  0153   *   *   TROPONINIHS 28.5*       LAST 7 DAYS MICROBIOLOGY   Microbiology Results (last 7 days)       Procedure Component Value Units Date/Time    Aerobic culture [[id number]] Collected: 09/24/24 1535    Order Status: Completed Specimen: Incision site from Groin Updated: 09/25/24 1047     Aerobic Bacterial Culture No growth    Narrative:      Left groin incision site drainage swab    Blood culture [[id number]] Collected: 09/24/24 1513    Order Status: Completed Specimen: Blood Updated: 09/24/24 2158     Blood Culture, Routine No Growth to date    Narrative:      Collection has been rescheduled by [email or web address] at 09/23/2024 14:35 Reason:   Patient unavailable dialysis   Collection has been rescheduled by MYB at 09/23/2024 18:44 Reason:   Unable to collect  Collection has been rescheduled by CZB at 09/23/2024 19:02 Reason:   Unable to collect  Collection has been rescheduled by RE1 at 09/24/2024 09:43 Reason:   Spoke to the patient's nurse about unable to collect she is   contacting Lambert and the doctor  Collection has been rescheduled by [email or web address] at 09/23/2024 14:35 Reason:   Patient unavailable dialysis   Collection has been rescheduled by MYB at 09/23/2024 18:44 Reason:   Unable to collect  Collection has been rescheduled by CZB at 09/23/2024 19:02 Reason:   Unable to collect  Collection has been rescheduled by RE1 at 09/24/2024 09:43 Reason:   Spoke to the patient's nurse  about unable to collect she is   contacting [facility] and the doctor    Blood culture [[id number]] Collected: 09/24/24 1122    Order Status: Completed Specimen: Blood Updated: 09/24/24 1917     Blood Culture, Routine No Growth to date    Narrative:      Collection has been rescheduled by [email or web address] at 09/23/2024 14:35 Reason:   Patient unavailable dialysis   Collection has been rescheduled by MYB at 09/23/2024 18:44 Reason:   Unable to collect  Collection has been rescheduled by CZB at 09/23/2024 19:02 Reason:   Unable to collect  Collection has been rescheduled by RE1 at 09/24/2024 09:43 Reason:   Spoke to the patient's nurse about unable to collect she is   contacting [facility] and the doctor  Collection has been rescheduled by [email or web address] at 09/23/2024 14:35 Reason:   Patient unavailable dialysis   Collection has been rescheduled by MYB at 09/23/2024 18:44 Reason:   Unable to collect  Collection has been rescheduled by CZB at 09/23/2024 19:02 Reason:   Unable to collect  Collection has been rescheduled by RE1 at 09/24/2024 09:43 Reason:   Spoke to the patient's nurse about unable to collect she is   contacting [facility] and the doctor    Gram stain [[id number]] Collected: 09/24/24 1535    Order Status: Completed Specimen: Incision site from Groin Updated: 09/24/24 1829     Gram Stain Result Few WBC's      No organisms seen    Narrative:      Left groin incision site drainage    Blood culture x two cultures. Draw prior to antibiotics. [[id number]] Collected: 09/17/24 0949    Order Status: Completed Specimen: Blood from Peripheral, Antecubital, Right Updated: 09/22/24 1632     Blood Culture, Routine No growth after 5 days.    Narrative:      Aerobic and anaerobic    Blood culture x two cultures. Draw prior to antibiotics. [[id number]] Collected: 09/17/24 0949    Order Status: Completed Specimen: Blood from Peripheral, Antecubital, Right Updated: 09/22/24 1632     Blood Culture, Routine No growth after 5 days.    Narrative:      Aerobic and  anaerobic    Urine culture [[id number]] Collected: 09/19/24 1435    Order Status: Completed Specimen: Urine Updated: 09/22/24 0740     Urine Culture, Routine No growth    Narrative:      Specimen Source->Urine            MOST RECENT IMAGING  CT Abdomen Pelvis With IV Contrast Routine Oral Contrast  Narrative: CMS MANDATED QUALITY DATA - CT RADIATION - 436    All CT scans at this facility utilize dose modulation, iterative reconstruction, and/or weight based dosing when appropriate to reduce radiation dose to as low as reasonably achievable.    CLINICAL HISTORY:  ([id number] y/o  (1980) M    Abdominal abscess/infection suspected;    TECHNIQUE:  (A#[id number], exam time 9/25/2024 9:25)    CT ABDOMEN PELVIS WITH IV CONTRAST [id number]    Axial CT images of the abdomen and pelvis were obtained from the dome of the diaphragm to the proximal thighs.    COMPARISON:  Most recent CT from 09/22/2024.    FINDINGS:  Lower Thorax:    The lungs are essentially clear noting mild bilateral dependent atelectasis versus scarring. There is no pleural or pericardial effusion. There are scattered coronary arterial calcifications.    CT Abdomen:    Liver: Relative diffuse low-attenuation liver consistent with hepatic steatosis.  The liver is enlarged measuring 21 cm sagittal right lobe.    Gallbladder: The gallbladder is within normal limits.    Biliary Tree: No intra or extrahepatic ductal dilation.    Spleen: Normal.    Pancreas: The pancreas is normal.    Adrenal Glands: Normal    Kidneys: No hydronephrosis/hydroureter or evidence of obstructive uropathy.  The kidneys are small/atrophic with renal cortical thinning in keeping with chronic medical renal disease.  There is a 2.5 cm simple cyst in the interpolar aspect of the left kidney, unchanged from the previous exam.    Vasculature: Scattered calcified plaques are present the abdominal aorta and iliacs with no aneurysm.    Lymph nodes: No abdominal lymphadenopathy is  seen.    Intraperitoneal structures: There is no ascites.    Bowel: There appears to have been a prior sigmoidectomy with side to side anastomosis in the left lower abdominal quadrant (image 181), there is a small focal perforation adjacent to the anastomosis (image 179).  In addition there is a small volume of intra-abdominal free air along the left ventral abdominal wall.    Abdominal wall: Moderate subcutaneous soft tissue emphysema and edema is seen along the rectus musculature on the left mid-upper abdomen.    Musculoskeletal: No acute osseous abnormality is identified.    CT Pelvis:    Bladder: Contrast is seen within the bladder, possibly from the recent catheterization.    Reproductive Organs: The prostate and seminal vesicles are within normal limits.    -asymmetric left hemiscrotum skin thickening and edema extending into the left inguinal region, similar to the previous exam, with a percutaneous BERNABE drain in the left hemiscrotum and left inguinal region.  There is an additional right lower abdominal quadrant percutaneous BERNABE drain with tip in the left pericolic gutter.  There is focal fat stranding along the left inguinal region.    Pelvic Lymph nodes: No gross lymphadenopathy is identified with numerous small lymph nodes in the inguinal region, similar to the previous exam (likely reactive/inflammatory).  Impression: 1.  Prior sigmoidectomy with a small focal perforation in the left lower abdominal quadrant containing oral contrast and gas.    2.  New small volume of intra-abdominal free air in the anterior left upper-mid abdomen (likely from the sigmoid colon anastomosis).    3.  New moderate soft tissue emphysema and edema along the left mid-upper abdominal rectus musculature; while this may be from recent/interval instrumentation, a gas-forming infection/necrotizing fasciitis is a consideration, and correlation with the clinical symptoms, history and prompt surgical follow-up would be warranted.    4.   Skin thickening, subcutaneous edema, in the left hemiscrotum and inguinal region, similar to the previous CT, with percutaneous drainage catheters, unchanged in configuration.    5.  Discoid interstitial opacities in the dependent lower lobes suggestive of atelectasis.    6.  Numerous additional, and incidental findings as above, similar to the previous CT.    RESULT NOTIFICATION: These observations were discussed by the dictating physician, by phone with, and acknowledged by Ag Reyes at 09:33 on 9/25/2024.    Electronically signed by: Jonah Williamson  Date:    09/25/2024  Time:    09:37  X-Ray Chest AP Portable  Narrative: EXAMINATION:  XR CHEST AP PORTABLE    CLINICAL HISTORY:  Fever, to look for pneumonia;    FINDINGS:  Portable chest at 09:02 is compared to 09/23/2024 shows mild cardiomegaly.  There is a left IJ catheter in stable position.    There is atelectasis in the lung bases.  There is development of mild pneumoperitoneum w beneath the right hemidiaphragm.  Patient has history of recent colectomy and drainage of groin abscess.  The upper lobes are clear.  There are no acute osseous abnormalities.  Impression: Mild cardiomegaly with atelectasis in lung bases    Minimal pneumoperitoneum beneath the right hemidiaphragm patient has history of recent abdominal surgery.    Electronically signed by: Shannon Mercado  Date:    09/25/2024  Time:    09:20      CURRENT VISIT EKG  Results for orders placed or performed during the hospital encounter of 09/17/24   EKG 12-lead    Narrative    Ordered by an unspecified provider.       ECHOCARDIOGRAM RESULTS  Results for orders placed during the hospital encounter of 07/15/24    Echo    Interpretation Summary    Limited echo only for EF and to look at valves due to bacteremia.    Left Ventricle: The left ventricle is normal in size. Increased wall thickness. There is concentric remodeling. There is normal systolic function with a visually estimated ejection  "fraction of 55 - 60%.    Aortic Valve: There is no mass/vegetation present.    Mitral Valve: There is no mass/vegetation present.    Tricuspid Valve: There is no mass/vegetation present.    Pulmonic Valve: There is no mass/vegetation present.        Oxygen INFORMATION  Oxygen Concentration (%):  [40] 40    The patient is on 2 L of oxygen       LAST ARTERIAL BLOOD GAS  ABG  No results for input(s): "PH", "PO2", "PCO2", "HCO3", "BE" in the last 168 hours.    IMPRESSION AND PLAN  Intrabominal sepsis  - going back to the OR for a leak  - continue Zosyn  ESRD  - dialyzed last night through fistula  - will d/c Adrián  CLOVIS  - sleeps on PAP, there is no one to get his machine from his house, will continue to use ours  - he is on BiPAP with a max IPAP of 18 minutes EPAP of 14 and 4 of pressure support  - no evidence of hypercapnia  Bilateral epididyrmal orchitis with a complex hydrocele  - ice and elevation suggested for this from Urology  Anemia  - patient may require transfusion especially after surgery  Leukocytosis  - expected with the intra-abdominal process  Transaminitis  - mild  - trend    Discussed with nursing and Respiratory and renal and surgery and the patient.    Critical care time spent reviewing the chart, examining the patient, reviewing the labs, reviewing the radiological findings, discussing care with nursing, physicians, and respiratory and creating the note and  has been greater than 35 minutes.    Kisha Rodriguez MD  Date of Service: 09/25/2024  10:56 AM   "

## 2024-09-25 NOTE — NURSING
MD notified patient's blood pressure 80/40s. New orders received. Give 250ml NS bolus. Give Midodrine PO.

## 2024-09-25 NOTE — PROGRESS NOTES
INPATIENT NEPHROLOGY Progress  [facility] NEPHROLOGY    João Ham  09/25/2024    Reason for consultation:  ESRD    Chief Complaint:   Chief Complaint   Patient presents with    Fatigue    Groin Swelling     X 2 days.  Hx of Kidney failure and CHF     History of Present Illness:  Per H and P    Patient is a 44 year old male with history of ESDR on dialysis MWF, awaiting kidney transplant, HTN, presented to ED with 3 days history of left groin pain and swelling. States swelling comes every time he cough and is being painful. Patient has low grade fever 99s at home. He has been vomiting bilious fluid last 2-3 days. No diarrhea or abdominal pain.      In the ED CT abdomen showed Left inguinal hernia containing fat and air as well as marked inflammation extending from inside the pelvis, in the hernia and down into the left scrotum. This is consistent with an infected inguinal hernia, possible gangrene. WBC was 14, patient was tachycardic. General surgery was consulted and patient was admitted under hospitalist.     9/19  avf nonfunctional.   No sob or chest pain.  Has post op pain.  AFVSS  9/20  afvss.  Pt doesn't want to see previous vascular surgeon who put his avf in.  No alternative available.  Transfer center called.  Has temporary trialysis catheter.  Patient seen on hemodialysis for uremic clearance and ultrafiltration.    9/21  2.5L off w/HD yesterday.  Tmax 101.8, pressures ok.  Lab results reviewed, Hgb low but better than yesterday.  C/o post op pain, no other complaints.  9/22 POD 5.  VSS, lab results reviewed.  Hgb 7.6, added epo to HD orders.  C/o gas pains, plans to move around more today.  Next HD tomorrow.  9/23 VSS. HD today. Transfuse with next HD as needed if Hgb stil low.  9/24 VSS. Appreciate input from Urology, Gen Surgery, Vascular Surgery, ID on this complex patient. Plan for fistulogram tomorrow. CXR yesterday shows trialysis line tip in appropriate cocation. Will attempt HD today since we  skipped yesterday due to nurse and patient concerns that the line may be malpositioned.  9/25 VSS. s/p cephalic vein dilation by Dr. Robledo yesterday, tolerated HD very well. Hold HD today. Hypotension this AM, low grade fever yesterday, WBC elevated, received IVF bolus and Midodrine. Continues to be infected per Surgery, we can remove trialysis line now that AVF is treated and usable... Consider ICU. Stopped hydralazine and olmesartan, decreased Clonidine to 0.1 BID, not sure what to do with Metoprolol 150mg and Hydralazine 120mg... He may be sob/hypoxic due to anemia, suggest 1 PRBC instead of IVF, since Hgb is 7.    Addendum @ 10:47 AM  Seen at bedside in ICU with Dr. Rodriguez and Dr. Au. Mother present as well. Reviewed imaging. Agree with plan for urgent ex lap, remove central line and place mid line, keep current abx, hold on transfusion and dialysis and reassess later. ABG and fresh set of cx now, re-evalaute later.    Plan of Care:    ESRD on HD MWF  --continue dialysis per routine  --renal dose medication per routine    Anemia of CKD  --erythropoiesis stimulating agent with renal replacement therapy  --transfuse 1 PRBC due to hypoxic respiratory failure    Secondary HPT  --renal diet  --continue binders with meals    Hypertension  Hypotension with low grade fevers ? sepsis  --uf with hd as needed  --continue sepsis therapy  --line can be removed  --give blood, careful with IVF    Hyperkalemia  --2k dialysate  --low k diet    AVF malfunction fixed  --got temporary line, CXR on 9/23 confirms tip in typical location, unchanged.  --appreciate Vascular eval, s/p fistulogram and cephalic vein stenosis dilation on 9/23 by Dr. Polk.      Thank you for allowing us to participate in this patient's care. We will continue to follow.    Vital Signs:  Temp Readings from Last 3 Encounters:   09/25/24 100.1 °F (37.8 °C)   08/22/24 97.3 °F (36.3 °C) (Temporal)   07/30/24 98.6 °F (37 °C) (Oral)       Pulse Readings  from Last 3 Encounters:   09/25/24 80   08/29/24 (!) 115   08/22/24 110       BP Readings from Last 3 Encounters:   09/25/24 105/68   08/29/24 99/68   08/22/24 (!) 137/91       Weight:  Wt Readings from Last 3 Encounters:   09/18/24 134 kg (295 lb 6.7 oz)   08/29/24 131.3 kg (289 lb 7.4 oz)   08/22/24 131 kg (288 lb 12.8 oz)       Past Medical & Surgical History:  Past Medical History:   Diagnosis Date    Allergy     Anemia     Anxiety     CHF (congestive heart failure)     Depression     High blood pressure with chronic kidney disease, stage 5 chronic kidney disease or end stage renal disease     Hypertension        Past Surgical History:   Procedure Laterality Date    ABSCESS DRAINAGE Left 9/17/2024    Procedure: DRAINAGE, ABSCESS, GROIN;  Surgeon: Galileo Connors MD;  Location: [facility] OR;  Service: General;  Laterality: Left;    FISTULOGRAM Bilateral 4/30/2024    Procedure: Fistulogram;  Surgeon: Khoobehi, Ali, MD;  Location: [facility] CATH/EP LAB;  Service: Vascular;  Laterality: Bilateral;    HERNIA REPAIR Right     With mesh    INSERTION, CATHETER, TUNNELED N/A 6/22/2023    Procedure: Insertion,catheter,tunneled;  Surgeon: Everett Caicedo MD;  Location: [facility] OR;  Service: General;  Laterality: N/A;    PERCUTANEOUS TRANSLUMINAL ANGIOPLASTY OF ARTERIOVENOUS FISTULA Left 4/30/2024    Procedure: PTA, AV FISTULA;  Surgeon: Khoobehi, Ali, MD;  Location: [facility] CATH/EP LAB;  Service: Vascular;  Laterality: Left;    PHLEBOGRAPHY N/A 7/19/2024    Procedure: Venogram;  Surgeon: Khoobehi, Ali, MD;  Location: [facility] CATH/EP LAB;  Service: Vascular;  Laterality: N/A;    REMOVAL, TUNNELED CATH Right 7/19/2024    Procedure: REMOVAL, TUNNELED CATH;  Surgeon: Khoobehi, Ali, MD;  Location: [facility] CATH/EP LAB;  Service: Vascular;  Laterality: Right;    ROBOT-ASSISTED LAPAROSCOPIC RESECTION OF SIGMOID COLON USING DA DELANO XI N/A 9/17/2024    Procedure: XI ROBOTIC SIGMOID RESECTION, WITH ANASTAMOSIS;  Surgeon: Galileo Connors MD;   Location: [facility] OR;  Service: General;  Laterality: N/A;  possible open have instruments available       Past Social History:  Social History     Socioeconomic History    Marital status: Single   Tobacco Use    Smoking status: Never     Passive exposure: Never    Smokeless tobacco: Never   Substance and Sexual Activity    Alcohol use: Never    Drug use: Never    Sexual activity: Not Currently   Social History Narrative    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     Social Determinants of Health     Financial Resource Strain: Low Risk  (9/18/2024)    Overall Financial Resource Strain (CARDIA)     Difficulty of Paying Living Expenses: Not very hard   Food Insecurity: No Food Insecurity (9/18/2024)    Hunger Vital Sign     Worried About Running Out of Food in the Last Year: Never true     Ran Out of Food in the Last Year: Never true   Transportation Needs: No Transportation Needs (9/18/2024)    TRANSPORTATION NEEDS     Transportation : No   Physical Activity: Sufficiently Active (1/3/2023)    Exercise Vital Sign     Days of Exercise per Week: 6 days     Minutes of Exercise per Session: 60 min   Recent Concern: Physical Activity - Insufficiently Active (10/11/2022)    Exercise Vital Sign     Days of Exercise per Week: 3 days     Minutes of Exercise per Session: 30 min   Stress: Stress Concern Present (9/18/2024)    [employer] - Occupational Stress Questionnaire     Feeling of Stress : Very much   Housing Stability: Low Risk  (9/18/2024)    Housing Stability Vital Sign     Unable to Pay for Housing in the Last Year: No     Homeless in the Last Year: No       Medications:  No current facility-administered medications on file prior to encounter.     Current Outpatient Medications on File Prior to Encounter   Medication Sig Dispense Refill    apixaban (ELIQUIS) 2.5 mg Tab Take 2.5 mg by mouth 2 (two) times daily.      calcitRIOL (ROCALTROL) 0.25 MCG Cap Take 1 capsule by mouth once daily (Patient taking  differently: Take 0.25 mcg by mouth once daily.) 90 capsule 1    cholecalciferol, vitamin D3, 125 mcg (5,000 unit) Tab Take 1 tablet by mouth once daily.      cloNIDine (CATAPRES) 0.3 MG tablet TAKE 1 TABLET BY MOUTH THREE TIMES DAILY 270 tablet 2    isosorbide mononitrate (IMDUR) 120 MG 24 hr tablet Take 120 mg by mouth once daily.      metoprolol succinate (TOPROL-XL) 50 MG 24 hr tablet Take 150 mg by mouth once daily.      olmesartan (BENICAR) 40 MG tablet Take 1 tablet by mouth once daily 90 tablet 0    sevelamer carbonate (RENVELA) 800 mg Tab Take 2 tablets (1,600 mg total) by mouth 3 (three) times daily with meals. 180 tablet 11    calcium carbonate (TUMS) 200 mg calcium (500 mg) chewable tablet Take 2 tablets (1,000 mg total) by mouth 3 (three) times daily with meals. (Patient taking differently: Take 1,000 mg by mouth 3 (three) times daily.) 180 tablet 11    hydrALAZINE (APRESOLINE) 100 MG tablet Take 1 tablet (100 mg total) by mouth 3 (three) times daily. 90 tablet 11     Scheduled Meds:   bisacodyL  5 mg Oral QHS    cloNIDine  0.3 mg Oral TID    DAPTOmycin (CUBICIN) IV (PEDS and ADULTS)  10 mg/kg Intravenous Q48H    DAPTOmycin (CUBICIN) IV (PEDS and ADULTS)  10 mg/kg Intravenous Once    epoetin mily-epbx  10,000 Units Intravenous Every Mon, Wed, Fri    fluconazole (DIFLUCAN) IV (PEDS and ADULTS)  200 mg Intravenous Q24H    gabapentin  200 mg Oral BID    heparin (porcine)  7,500 Units Subcutaneous Q8H    hydrALAZINE  100 mg Oral TID    isosorbide mononitrate  120 mg Oral Daily    metoprolol succinate  150 mg Oral Daily    olmesartan  40 mg Oral Daily    piperacillin-tazobactam (Zosyn) IV (PEDS and ADULTS) (extended infusion is not appropriate)  4.5 g Intravenous Q12H    polyethylene glycol  17 g Oral Daily    senna-docusate 8.6-50 mg  1 tablet Oral Daily    sevelamer carbonate  1,600 mg Oral TID WM    sodium zirconium cyclosilicate  10 g Oral TID     Continuous Infusions:   lactated ringers   Intravenous  "Continuous           PRN Meds:.  Current Facility-Administered Medications:     0.9% NaCl, , Intravenous, PRN    acetaminophen, 650 mg, Oral, Q8H PRN    acetaminophen, 650 mg, Oral, Q4H PRN    aluminum-magnesium hydroxide-simethicone, 30 mL, Oral, QID PRN    dextrose 10%, 12.5 g, Intravenous, PRN    dextrose 10%, 12.5 g, Intravenous, PRN    dextrose 10%, 12.5 g, Intravenous, PRN    dextrose 10%, 25 g, Intravenous, PRN    dextrose 10%, 25 g, Intravenous, PRN    dextrose 10%, 25 g, Intravenous, PRN    glucagon (human recombinant), 1 mg, Intramuscular, PRN    glucose, 16 g, Oral, PRN    glucose, 24 g, Oral, PRN    heparin (porcine), 4,000 Units, Intravenous, PRN    HYDROcodone-acetaminophen, 2 tablet, Oral, Q4H PRN    HYDROmorphone, 1 mg, Intravenous, Q4H PRN    insulin aspart U-100, 0-5 Units, Subcutaneous, QID (AC + HS) PRN    iodixanoL, 100 mL, Intravenous, ONCE PRN    iohexoL, 100 mL, Intravenous, ONCE PRN    magnesium oxide, 800 mg, Oral, PRN    magnesium oxide, 800 mg, Oral, PRN    melatonin, 6 mg, Oral, Nightly PRN    naloxone, 0.02 mg, Intravenous, PRN    ondansetron, 4 mg, Intravenous, Q6H PRN    potassium bicarbonate, 35 mEq, Oral, PRN    potassium bicarbonate, 50 mEq, Oral, PRN    potassium bicarbonate, 60 mEq, Oral, PRN    potassium, sodium phosphates, 2 packet, Oral, PRN    potassium, sodium phosphates, 2 packet, Oral, PRN    potassium, sodium phosphates, 2 packet, Oral, PRN    sodium chloride 0.9%, 250 mL, Intravenous, PRN    sodium chloride 0.9%, 3 mL, Intravenous, Q12H PRN    Allergies:  Mushroom    Past Family History:  Reviewed; refer to Hospitalist Admission Note    Review of Systems:  Review of Systems - All 14 systems reviewed and negative, except as noted in HPI    Physical Exam:    /68 (BP Location: Right arm, Patient Position: Lying)   Pulse 80   Temp 100.1 °F (37.8 °C)   Resp 19   Ht 6' 2" (1.88 m)   Wt 134 kg (295 lb 6.7 oz)   SpO2 99%   BMI 37.93 kg/m²     General Appearance:    " Alert, cooperative, no distress, appears stated age   Head:    Normocephalic, without obvious abnormality, atraumatic   Eyes:    PER, conjunctiva/corneas clear, EOM's intact in both eyes        Throat:   Lips, mucosa, and tongue normal; teeth and gums normal   Back:     Symmetric, no curvature, ROM normal, no CVA tenderness   Lungs:     Clear to auscultation bilaterally, respirations unlabored   Chest wall:    No tenderness or deformity   Heart:    Regular rate and rhythm, S1 and S2 normal, no murmur, rub   or gallop   Abdomen:     Soft, non-tender, bowel sounds active all four quadrants,     no masses, no organomegaly   Extremities:   Extremities normal, atraumatic, no cyanosis or edema   Pulses:   2+ and symmetric all extremities   MSK:   No joint or muscle swelling, tenderness or deformity   Skin:   Skin color, texture, turgor normal, no rashes or lesions   Neurologic:   CNII-XII intact, normal strength and sensation       Throughout.  No flap     Results:  Lab Results   Component Value Date     09/25/2024    K 4.0 09/25/2024    CL 97 09/25/2024    CO2 25 09/25/2024    BUN 46 (H) 09/25/2024    CREATININE 13.0 (H) 09/25/2024    CALCIUM 8.9 09/25/2024    ANIONGAP 15 09/25/2024       Lab Results   Component Value Date    CALCIUM 8.9 09/25/2024    PHOS 5.2 (H) 09/20/2024     Recent Labs   Lab 09/25/24  0236   WBC 17.03*   RBC 2.26*   HGB 7.2*   HCT 22.2*      MCV 98   MCH 31.9*   MCHC 32.4     Jeremy Madrid MD    [facility] [place]  [phone]

## 2024-09-25 NOTE — ANESTHESIA PROCEDURE NOTES
Arterial    Diagnosis: Incarcerated hernia    Patient location during procedure: done in OR  Timeout: 9/25/2024 1:00 PM  Procedure end time: 9/25/2024 1:08 PM    Staffing  Authorizing Provider: Stefan Stewart MD  Performing Provider: Stefan Stewart MD    Staffing  Performed by: Stefan Stewart MD  Authorized by: Stefan Stewart MD    Anesthesiologist was present at the time of the procedure.    Preanesthetic Checklist  Completed: patient identified, IV checked, site marked, risks and benefits discussed, surgical consent, monitors and equipment checked, pre-op evaluation, timeout performed and anesthesia consent givenArterial  Skin Prep: chlorhexidine gluconate  Local Infiltration: lidocaine  Orientation: right  Location: radial    Catheter Size: 20 G  Catheter placement by Ultrasound guidance. Heme positive aspiration all ports.   Vessel Caliber: medium, patent, compressibility normal  Needle advanced into vessel with real time Ultrasound guidance.Insertion Attempts: 1  Assessment  Dressing: sutured in place and taped and tegaderm  Patient: Tolerated well

## 2024-09-26 ENCOUNTER — TELEPHONE (OUTPATIENT)
Dept: FAMILY MEDICINE | Facility: CLINIC | Age: 44
End: 2024-09-26
Payer: COMMERCIAL

## 2024-09-26 PROBLEM — E87.5 HYPERKALEMIA: Status: ACTIVE | Noted: 2024-09-26

## 2024-09-26 PROBLEM — D63.1 ANEMIA DUE TO CHRONIC KIDNEY DISEASE, ON CHRONIC DIALYSIS: Status: ACTIVE | Noted: 2022-10-10

## 2024-09-26 PROBLEM — N18.6 ANEMIA DUE TO CHRONIC KIDNEY DISEASE, ON CHRONIC DIALYSIS: Status: ACTIVE | Noted: 2022-10-10

## 2024-09-26 PROBLEM — Z99.2 ANEMIA DUE TO CHRONIC KIDNEY DISEASE, ON CHRONIC DIALYSIS: Status: ACTIVE | Noted: 2022-10-10

## 2024-09-26 LAB
ALBUMIN SERPL BCP-MCNC: 2.9 G/DL (ref 3.5–5.2)
ALP SERPL-CCNC: 124 U/L (ref 55–135)
ALT SERPL W/O P-5'-P-CCNC: 37 U/L (ref 10–44)
ANION GAP SERPL CALC-SCNC: 15 MMOL/L (ref 8–16)
ANISOCYTOSIS BLD QL SMEAR: ABNORMAL
AST SERPL-CCNC: 38 U/L (ref 10–40)
BASOPHILS NFR BLD: 0 % (ref 0–1.9)
BILIRUB SERPL-MCNC: 0.7 MG/DL (ref 0.1–1)
BUN SERPL-MCNC: 39 MG/DL (ref 6–20)
CALCIUM SERPL-MCNC: 8.7 MG/DL (ref 8.7–10.5)
CHLORIDE SERPL-SCNC: 96 MMOL/L (ref 95–110)
CO2 SERPL-SCNC: 24 MMOL/L (ref 23–29)
CREAT SERPL-MCNC: 11.4 MG/DL (ref 0.5–1.4)
DIFFERENTIAL METHOD BLD: ABNORMAL
EOSINOPHIL NFR BLD: 0 % (ref 0–8)
ERYTHROCYTE [DISTWIDTH] IN BLOOD BY AUTOMATED COUNT: 23.3 % (ref 11.5–14.5)
EST. GFR  (NO RACE VARIABLE): 5.2 ML/MIN/1.73 M^2
GIANT PLATELETS BLD QL SMEAR: PRESENT
GLUCOSE SERPL-MCNC: 110 MG/DL (ref 70–110)
HCT VFR BLD AUTO: 27.7 % (ref 40–54)
HGB BLD-MCNC: 8.2 G/DL (ref 14–18)
HGB BLD-MCNC: 8.9 G/DL (ref 14–18)
HYPOCHROMIA BLD QL SMEAR: ABNORMAL
IMM GRANULOCYTES # BLD AUTO: ABNORMAL K/UL (ref 0–0.04)
IMM GRANULOCYTES NFR BLD AUTO: ABNORMAL % (ref 0–0.5)
LYMPHOCYTES NFR BLD: 0 % (ref 18–48)
MAGNESIUM SERPL-MCNC: 2 MG/DL (ref 1.6–2.6)
MCH RBC QN AUTO: 29.7 PG (ref 27–31)
MCHC RBC AUTO-ENTMCNC: 32.1 G/DL (ref 32–36)
MCV RBC AUTO: 92 FL (ref 82–98)
METAMYELOCYTES NFR BLD MANUAL: 1 %
MONOCYTES NFR BLD: 8 % (ref 4–15)
MYELOCYTES NFR BLD MANUAL: 3 %
NEUTROPHILS NFR BLD: 81 % (ref 38–73)
NEUTS BAND NFR BLD MANUAL: 7 %
NRBC BLD-RTO: 0 /100 WBC
PLATELET # BLD AUTO: 443 K/UL (ref 150–450)
PLATELET BLD QL SMEAR: ABNORMAL
PMV BLD AUTO: 10.5 FL (ref 9.2–12.9)
POTASSIUM SERPL-SCNC: 5.3 MMOL/L (ref 3.5–5.1)
PROT SERPL-MCNC: 7.3 G/DL (ref 6–8.4)
RBC # BLD AUTO: 3 M/UL (ref 4.6–6.2)
SODIUM SERPL-SCNC: 135 MMOL/L (ref 136–145)
SPHEROCYTES BLD QL SMEAR: ABNORMAL
WBC # BLD AUTO: 20.66 K/UL (ref 3.9–12.7)

## 2024-09-26 PROCEDURE — 63600175 PHARM REV CODE 636 W HCPCS: Performed by: HOSPITALIST

## 2024-09-26 PROCEDURE — A4216 STERILE WATER/SALINE, 10 ML: HCPCS | Performed by: SURGERY

## 2024-09-26 PROCEDURE — 85007 BL SMEAR W/DIFF WBC COUNT: CPT | Performed by: SURGERY

## 2024-09-26 PROCEDURE — 97163 PT EVAL HIGH COMPLEX 45 MIN: CPT

## 2024-09-26 PROCEDURE — 25000003 PHARM REV CODE 250: Performed by: SURGERY

## 2024-09-26 PROCEDURE — 63600175 PHARM REV CODE 636 W HCPCS: Performed by: SURGERY

## 2024-09-26 PROCEDURE — 27100171 HC OXYGEN HIGH FLOW UP TO 24 HOURS

## 2024-09-26 PROCEDURE — 94761 N-INVAS EAR/PLS OXIMETRY MLT: CPT

## 2024-09-26 PROCEDURE — 85027 COMPLETE CBC AUTOMATED: CPT | Performed by: SURGERY

## 2024-09-26 PROCEDURE — 25000003 PHARM REV CODE 250: Performed by: INTERNAL MEDICINE

## 2024-09-26 PROCEDURE — 85018 HEMOGLOBIN: CPT | Performed by: INTERNAL MEDICINE

## 2024-09-26 PROCEDURE — 99900035 HC TECH TIME PER 15 MIN (STAT)

## 2024-09-26 PROCEDURE — 99900031 HC PATIENT EDUCATION (STAT)

## 2024-09-26 PROCEDURE — C9248 INJ, CLEVIDIPINE BUTYRATE: HCPCS | Performed by: INTERNAL MEDICINE

## 2024-09-26 PROCEDURE — 97530 THERAPEUTIC ACTIVITIES: CPT

## 2024-09-26 PROCEDURE — 20000000 HC ICU ROOM

## 2024-09-26 PROCEDURE — 63600175 PHARM REV CODE 636 W HCPCS: Performed by: INTERNAL MEDICINE

## 2024-09-26 PROCEDURE — 94660 CPAP INITIATION&MGMT: CPT

## 2024-09-26 PROCEDURE — 97165 OT EVAL LOW COMPLEX 30 MIN: CPT

## 2024-09-26 PROCEDURE — 80053 COMPREHEN METABOLIC PANEL: CPT | Performed by: SURGERY

## 2024-09-26 PROCEDURE — 99233 SBSQ HOSP IP/OBS HIGH 50: CPT | Mod: ,,, | Performed by: INTERNAL MEDICINE

## 2024-09-26 PROCEDURE — 99291 CRITICAL CARE FIRST HOUR: CPT | Mod: ,,, | Performed by: INTERNAL MEDICINE

## 2024-09-26 PROCEDURE — 83735 ASSAY OF MAGNESIUM: CPT | Performed by: SURGERY

## 2024-09-26 PROCEDURE — 63600175 PHARM REV CODE 636 W HCPCS: Mod: JZ,JG | Performed by: ANESTHESIOLOGY

## 2024-09-26 PROCEDURE — 90935 HEMODIALYSIS ONE EVALUATION: CPT

## 2024-09-26 RX ORDER — SODIUM CHLORIDE 9 MG/ML
INJECTION, SOLUTION INTRAVENOUS
Status: CANCELLED | OUTPATIENT
Start: 2024-09-26

## 2024-09-26 RX ORDER — METOPROLOL TARTRATE 1 MG/ML
5 INJECTION, SOLUTION INTRAVENOUS EVERY 6 HOURS
Status: DISCONTINUED | OUTPATIENT
Start: 2024-09-26 | End: 2024-09-27

## 2024-09-26 RX ORDER — HYDROMORPHONE HYDROCHLORIDE 1 MG/ML
1 INJECTION, SOLUTION INTRAMUSCULAR; INTRAVENOUS; SUBCUTANEOUS
Status: DISCONTINUED | OUTPATIENT
Start: 2024-09-26 | End: 2024-09-27

## 2024-09-26 RX ORDER — LIDOCAINE AND PRILOCAINE 25; 25 MG/G; MG/G
CREAM TOPICAL ONCE
Status: DISCONTINUED | OUTPATIENT
Start: 2024-09-26 | End: 2024-10-06 | Stop reason: HOSPADM

## 2024-09-26 RX ORDER — SODIUM CHLORIDE 9 MG/ML
INJECTION, SOLUTION INTRAVENOUS ONCE
Status: CANCELLED | OUTPATIENT
Start: 2024-09-26 | End: 2024-09-26

## 2024-09-26 RX ADMIN — METOPROLOL TARTRATE 5 MG: 1 INJECTION, SOLUTION INTRAVENOUS at 11:09

## 2024-09-26 RX ADMIN — SODIUM CHLORIDE, PRESERVATIVE FREE 3 ML: 5 INJECTION INTRAVENOUS at 01:09

## 2024-09-26 RX ADMIN — HEPARIN SODIUM 7500 UNITS: 5000 INJECTION INTRAVENOUS; SUBCUTANEOUS at 02:09

## 2024-09-26 RX ADMIN — HYDROMORPHONE HYDROCHLORIDE 1 MG: 1 INJECTION, SOLUTION INTRAMUSCULAR; INTRAVENOUS; SUBCUTANEOUS at 01:09

## 2024-09-26 RX ADMIN — HYDROMORPHONE HYDROCHLORIDE 1 MG: 1 INJECTION, SOLUTION INTRAMUSCULAR; INTRAVENOUS; SUBCUTANEOUS at 04:09

## 2024-09-26 RX ADMIN — HYDROMORPHONE HYDROCHLORIDE 1 MG: 1 INJECTION, SOLUTION INTRAMUSCULAR; INTRAVENOUS; SUBCUTANEOUS at 10:09

## 2024-09-26 RX ADMIN — HYDROMORPHONE HYDROCHLORIDE 1 MG: 1 INJECTION, SOLUTION INTRAMUSCULAR; INTRAVENOUS; SUBCUTANEOUS at 02:09

## 2024-09-26 RX ADMIN — PIPERACILLIN SODIUM AND TAZOBACTAM SODIUM 4.5 G: 4; .5 INJECTION, POWDER, LYOPHILIZED, FOR SOLUTION INTRAVENOUS at 11:09

## 2024-09-26 RX ADMIN — CLEVIPIDINE 4 MG/HR: 0.5 EMULSION INTRAVENOUS at 05:09

## 2024-09-26 RX ADMIN — NITROGLYCERIN 1 INCH: 20 OINTMENT TOPICAL at 11:09

## 2024-09-26 RX ADMIN — HEPARIN SODIUM 7500 UNITS: 5000 INJECTION INTRAVENOUS; SUBCUTANEOUS at 09:09

## 2024-09-26 RX ADMIN — METOPROLOL TARTRATE 5 MG: 1 INJECTION, SOLUTION INTRAVENOUS at 05:09

## 2024-09-26 RX ADMIN — FLUCONAZOLE 200 MG: 2 INJECTION, SOLUTION INTRAVENOUS at 09:09

## 2024-09-26 RX ADMIN — CLEVIPIDINE 2 MG/HR: 0.5 EMULSION INTRAVENOUS at 11:09

## 2024-09-26 RX ADMIN — PIPERACILLIN SODIUM AND TAZOBACTAM SODIUM 4.5 G: 4; .5 INJECTION, POWDER, LYOPHILIZED, FOR SOLUTION INTRAVENOUS at 10:09

## 2024-09-26 RX ADMIN — NITROGLYCERIN 1 INCH: 20 OINTMENT TOPICAL at 10:09

## 2024-09-26 RX ADMIN — NITROGLYCERIN 1 INCH: 20 OINTMENT TOPICAL at 05:09

## 2024-09-26 RX ADMIN — HYDROMORPHONE HYDROCHLORIDE 1 MG: 1 INJECTION, SOLUTION INTRAMUSCULAR; INTRAVENOUS; SUBCUTANEOUS at 07:09

## 2024-09-26 RX ADMIN — HYDROMORPHONE HYDROCHLORIDE 1 MG: 1 INJECTION, SOLUTION INTRAMUSCULAR; INTRAVENOUS; SUBCUTANEOUS at 09:09

## 2024-09-26 RX ADMIN — METOPROLOL TARTRATE 5 MG: 1 INJECTION, SOLUTION INTRAVENOUS at 10:09

## 2024-09-26 RX ADMIN — HEPARIN SODIUM 7500 UNITS: 5000 INJECTION INTRAVENOUS; SUBCUTANEOUS at 06:09

## 2024-09-26 RX ADMIN — ACETAMINOPHEN 1000 MG: 10 INJECTION INTRAVENOUS at 02:09

## 2024-09-26 RX ADMIN — HYDROMORPHONE HYDROCHLORIDE 1 MG: 1 INJECTION, SOLUTION INTRAMUSCULAR; INTRAVENOUS; SUBCUTANEOUS at 08:09

## 2024-09-26 RX ADMIN — ACETAMINOPHEN 1000 MG: 10 INJECTION INTRAVENOUS at 06:09

## 2024-09-26 RX ADMIN — CLEVIPIDINE 6 MG/HR: 0.5 EMULSION INTRAVENOUS at 09:09

## 2024-09-26 NOTE — PROGRESS NOTES
Net UF 2L. Bp dropped during tx.   09/25/24 2145   Required for all Hemodialysis Patients   Hepatitis Status negative   Handoff Report   Received From ELI Meraz   Given To ELI Iraheta   Vital Signs   Temp (!) 100.4 °F (38 °C)   Temp Source Axillary   Pulse (!) 136   Heart Rate Source Monitor   Resp 20   SpO2 100 %   Device (Oxygen Therapy) BIPAP   /61   MAP (mmHg) 88   Assessments (Pre/Post)   Consent Obtained yes   Safety vein preservation armband present   Date Hepatitis Profile Obtained 09/19/24   Blood Liters Processed (BLP) 47.1   Transport Modality bed   Level of Consciousness (AVPU) alert   Dialyzer Clearance moderately streaked   Post-Hemodialysis Assessment   Rinseback Volume (mL) 250 mL   Blood Volume Processed (Liters) 47.1 L   Dialyzer Clearance Moderately streaked   Duration of Treatment 120 minutes   Additional Fluid Intake (mL) 500 mL   Total UF (mL) 2500 mL   Net Fluid Removal 2000   Patient Response to Treatment Bp dropped during dialysis   Post-Treatment Weight 139 kg (306 lb 7 oz)   Treatment Weight Change -3   Arterial bleeding stop time (min) 5 min   Venous bleeding stop time (min) 5 min   Post-Hemodialysis Comments Pt stable. All blood reinfused and needles pulled per protocol.

## 2024-09-26 NOTE — PROGRESS NOTES
"[facility]  Adult Nutrition   Progress Note (Initial Assessment)     SUMMARY     Recommendations  Recommendation/Intervention:   1. Advance diet as tolerated to Renal.   2. Offer Nepro supplement should NPO be < 50% for >/= 5 days.   3. Educate patient for ostomy diet when appropriate.    Goals: 1. Diet will advance by follow up.  Nutrition Goal Status: new    Communication of RD Recs: discussed on rounds    Nutrition Diagnosis PES Statement: Altered GI function related to bowel resection as evidenced by patient post-op day 1 with ostomy, strict NPO status.    Dietitian Rounds Brief  RD screen for LOS 9. Patient not available yesterday. Discussed patient during rounds. Patient was still on Bipap this am. No physical signs of malnutrition per visual. RD to monitor for diet progression, tolerance and status PRN.    Nutrition Related Social Determinants of Health: SDOH: None Identified    Malnutrition Assessment   N/A          Diet order:   Current Diet Order: Strict NPO         Evaluation of Received Nutrient/Fluid Intake  Energy Calories Required: not meeting needs  Protein Required: not meeting needs  Fluid Required: meeting needs  Tolerance: other (see comments) (NPO)     % Intake of Estimated Energy Needs: 0%  % Meal Intake: NPO      Intake/Output Summary (Last 24 hours) at 9/26/2024 1554  Last data filed at 9/26/2024 0620  Gross per 24 hour   Intake 1565.53 ml   Output 2728 ml   Net -1162.47 ml        Anthropometrics  Temp: 97.7 °F (36.5 °C)  Height: 6' 2" (188 cm)  Height (inches): 74 in  Weight Method: Bed Scale  Weight: 134 kg (295 lb 6.7 oz)  Weight (lb): 295.42 lb  Ideal Body Weight (IBW), Male: 190 lb  % Ideal Body Weight, Male (lb): 148.95 %  BMI (Calculated): 37.9       Estimated/Assessed Needs  Weight Used For Calorie Calculations: 134 kg (295 lb 6.7 oz)  Energy Calorie Requirements (kcal): 8700-4729 kcal/day (11-14 kcal/kg)  Energy Need Method: Kcal/kg  Protein Requirements: 129-172 " gm/day (1.5-2.0 gm/kg IBW / acute HD pt)  Weight Used For Protein Calculations: 86 kg (189 lb 9.5 oz) (IBW)  Fluid Requirements (mL): 24 hour UOP + 1000 mL or per MD  Estimated Fluid Requirement Method: other (see comments)  RDA Method (mL): 1474  CHO Requirement: 184-234 gm/day (12-15 cho servings/day)    Reason for Assessment  Reason For Assessment: length of stay  Diagnosis: surgery/postoperative complications, gastrointestinal disease  Relevant Medical History: ESDR on dialysis MWF, awaiting kidney transplant, HTN  Interdisciplinary Rounds: attended  General Information Comments: ICU day 1. LOS day 9. Patient s/p Robotic sigmoid colectomy with isoperistaltic anastomosis   Left inguinal canal exploration with drainage of abscess 9/20/24. Patient went back to surgery yesterday.  Nutrition Discharge Planning: Renal diet as tolerated.    Nutrition/Diet History  Spiritual, Cultural Beliefs, Jehovah's witness Practices, Values that Affect Care: no  Food Allergies: other (see comments) (MUSHROOM)    Nutrition Risk Screen  Nutrition Risk Screen: no indicators present     MST Score: 0  Have you recently lost weight without trying?: No  Weight loss score: 0  Have you been eating poorly because of a decreased appetite?: No  Appetite score: 0       Weight History:  Wt Readings from Last 10 Encounters:   09/18/24 134 kg (295 lb 6.7 oz)   08/29/24 131.3 kg (289 lb 7.4 oz)   08/22/24 131 kg (288 lb 12.8 oz)   07/30/24 131.1 kg (289 lb 0.4 oz)   07/17/24 129.3 kg (285 lb)   07/16/24 128.3 kg (282 lb 13.6 oz)   06/25/24 129.3 kg (285 lb 0.9 oz)   04/30/24 129.3 kg (285 lb)   02/27/24 133 kg (293 lb 3.4 oz)   02/21/24 136 kg (299 lb 13.2 oz)        Lab/Procedures/Meds: Pertinent Labs/Meds Reviewed    Medications:Pertinent Medications Reviewed  Scheduled Meds:   DAPTOmycin (CUBICIN) IV (PEDS and ADULTS)  10 mg/kg Intravenous Q48H    DAPTOmycin (CUBICIN) IV (PEDS and ADULTS)  10 mg/kg Intravenous Once    epoetin mily-epbx  10,000 Units  Intravenous Every Mon, Wed, Fri    fluconazole (DIFLUCAN) IV (PEDS and ADULTS)  200 mg Intravenous Q24H    gabapentin  200 mg Oral BID    heparin (porcine)  7,500 Units Subcutaneous Q8H    HYDROmorphone  1 mg Intravenous Once    LIDOcaine-prilocaine   Topical (Top) Once    metoprolol  5 mg Intravenous Q6H    nitroGLYCERIN 2% TD oint  1 inch Topical (Top) Q6H    piperacillin-tazobactam (Zosyn) IV (PEDS and ADULTS) (extended infusion is not appropriate)  4.5 g Intravenous Q12H     Continuous Infusions:   clevidipine  0-21 mg/hr Intravenous Continuous 4 mL/hr at 09/26/24 1139 2 mg/hr at 09/26/24 1139     PRN Meds:.  Current Facility-Administered Medications:     0.9% NaCl, , Intravenous, PRN    acetaminophen, 650 mg, Oral, Q8H PRN    acetaminophen, 650 mg, Oral, Q4H PRN    aluminum-magnesium hydroxide-simethicone, 30 mL, Oral, QID PRN    dextrose 10%, 12.5 g, Intravenous, PRN    dextrose 10%, 12.5 g, Intravenous, PRN    dextrose 10%, 12.5 g, Intravenous, PRN    dextrose 10%, 25 g, Intravenous, PRN    dextrose 10%, 25 g, Intravenous, PRN    dextrose 10%, 25 g, Intravenous, PRN    glucagon (human recombinant), 1 mg, Intramuscular, PRN    glucose, 16 g, Oral, PRN    glucose, 24 g, Oral, PRN    heparin (porcine), 4,000 Units, Intravenous, PRN    HYDROcodone-acetaminophen, 2 tablet, Oral, Q4H PRN    HYDROmorphone, 1 mg, Intravenous, Q2H PRN    insulin aspart U-100, 0-5 Units, Subcutaneous, QID (AC + HS) PRN    iohexoL, 100 mL, Intravenous, ONCE PRN    magnesium oxide, 800 mg, Oral, PRN    magnesium oxide, 800 mg, Oral, PRN    melatonin, 6 mg, Oral, Nightly PRN    naloxone, 0.02 mg, Intravenous, PRN    ondansetron, 4 mg, Intravenous, Q6H PRN    potassium bicarbonate, 35 mEq, Oral, PRN    potassium bicarbonate, 50 mEq, Oral, PRN    potassium bicarbonate, 60 mEq, Oral, PRN    potassium, sodium phosphates, 2 packet, Oral, PRN    potassium, sodium phosphates, 2 packet, Oral, PRN    potassium, sodium phosphates, 2 packet, Oral,  "PRN    sodium chloride 0.9%, 250 mL, Intravenous, PRN    sodium chloride 0.9%, 3 mL, Intravenous, Q12H PRN    Labs: Pertinent Labs Reviewed  Clinical Chemistry:  Recent Labs   Lab 09/20/24  0153 09/25/24  0236 09/25/24  1737 09/26/24  0501   * 137 136 135*   K 5.0 4.0 4.4 5.3*   CL 95 97 98 96   CO2 26 25 18* 24    136* 108 110   BUN 42* 46* 51* 39*   CREATININE 13.4* 13.0* 13.4* 11.4*   CALCIUM 8.3* 8.9 8.7 8.7   PROT 6.9 7.2 7.4 7.3   ALBUMIN 3.1* 3.1* 3.0* 2.9*   BILITOT 0.6 0.6 0.9 0.7   ALKPHOS 71 161* 152* 124   AST 26 64* 49* 38   ALT 14 48* 45* 37   ANIONGAP 14 15 20* 15   MG 1.8 2.2 2.2 2.0   PHOS 5.2*  --   --   --     < > = values in this interval not displayed.     CBC:   Recent Labs   Lab 09/26/24  0501 09/26/24  0954   WBC 20.66*  --    RBC 3.00*  --    HGB 8.9* 8.2*   HCT 27.7*  --      --    MCV 92  --    MCH 29.7  --    MCHC 32.1  --      Lipid Panel:  No results for input(s): "CHOL", "HDL", "LDLCALC", "TRIG", "CHOLHDL" in the last 168 hours.  Cardiac Profile:  Recent Labs   Lab 09/20/24  0153   *   *     Inflammatory Labs:  Recent Labs   Lab 09/20/24  0152 09/24/24  1514   CRP >16.00* 35.70*     Diabetes:  Recent Labs   Lab 09/24/24  0749 09/24/24  1519 09/24/24 2025   POCTGLUCOSE 197* 101 132*     Thyroid & Parathyroid:  No results for input(s): "TSH", "FREET4", "E6RXCAN", "U1KVCUD", "THYROIDAB" in the last 168 hours.    Monitor and Evaluation  Food and Nutrient Intake: energy intake, food and beverage intake  Food and Nutrient Adminstration: diet order  Knowledge/Beliefs/Attitudes: food and nutrition knowledge/skill  Physical Activity and Function: nutrition-related ADLs and IADLs  Anthropometric Measurements: body mass index, weight change, weight  Biochemical Data, Medical Tests and Procedures: electrolyte and renal panel, gastrointestinal profile, glucose/endocrine profile, inflammatory profile, lipid profile  Nutrition-Focused Physical Findings: overall " appearance     Nutrition Risk  Level of Risk/Frequency of Follow-up:  (2 times / week)     Nutrition Follow-Up  RD Follow-up?: Yes

## 2024-09-26 NOTE — ASSESSMENT & PLAN NOTE
This patient does have evidence of infective focus  My overall impression is sepsis.  Source: Abdominal  Antibiotics given-   Antibiotics (72h ago, onward)      Start     Stop Route Frequency Ordered    09/24/24 1800  piperacillin-tazobactam 4.5 g in dextrose 5 % 100 mL IVPB (ready to mix)         -- IV Every 12 hours (non-standard times) 09/24/24 1035    09/23/24 1800  DAPTOmycin (CUBICIN) 1,340 mg in 0.9% NaCl SolP 50 mL IVPB         -- IV Every 48 hours (non-standard times) 09/22/24 1758    09/22/24 1800  DAPTOmycin (CUBICIN) 1,340 mg in 0.9% NaCl SolP 50 mL IVPB         -- IV Once 09/22/24 1801          Latest lactate reviewed-  Recent Labs   Lab 09/24/24  1122   LACTATE 0.6       - Intestinal Perforation as above      Will Not start Pressors- Levophed for MAP of 65  Source control achieved by: IV antibiotics and surgical intervention

## 2024-09-26 NOTE — PLAN OF CARE
Problem: Adult Inpatient Plan of Care  Goal: Plan of Care Review  Outcome: Progressing  Goal: Patient-Specific Goal (Individualized)  Outcome: Progressing  Goal: Absence of Hospital-Acquired Illness or Injury  Outcome: Progressing  Goal: Optimal Comfort and Wellbeing  Outcome: Progressing  Goal: Readiness for Transition of Care  Outcome: Progressing     Problem: Wound  Goal: Optimal Coping  Outcome: Progressing  Goal: Optimal Functional Ability  Outcome: Progressing  Goal: Absence of Infection Signs and Symptoms  Outcome: Progressing  Goal: Improved Oral Intake  Outcome: Progressing  Goal: Optimal Pain Control and Function  Outcome: Progressing  Goal: Skin Health and Integrity  Outcome: Progressing  Goal: Optimal Wound Healing  Outcome: Progressing     Problem: Infection  Goal: Absence of Infection Signs and Symptoms  Outcome: Progressing     Problem: Skin Injury Risk Increased  Goal: Skin Health and Integrity  Outcome: Progressing     Problem: Hemodialysis  Goal: Safe, Effective Therapy Delivery  Outcome: Progressing  Goal: Effective Tissue Perfusion  Outcome: Progressing  Goal: Absence of Infection Signs and Symptoms  Outcome: Progressing     Problem: Fall Injury Risk  Goal: Absence of Fall and Fall-Related Injury  Outcome: Progressing     Problem: Violence Risk or Actual  Goal: Anger and Impulse Control  Outcome: Progressing     Problem: Sepsis/Septic Shock  Goal: Optimal Coping  Outcome: Progressing  Goal: Absence of Bleeding  Outcome: Progressing  Goal: Blood Glucose Level Within Targeted Range  Outcome: Progressing  Goal: Absence of Infection Signs and Symptoms  Outcome: Progressing  Goal: Optimal Nutrition Intake  Outcome: Progressing

## 2024-09-26 NOTE — ASSESSMENT & PLAN NOTE
-status post recent surgery   -has worsening abdominal pain, tenderness, no bowel movement today, worsening leukocytosis, multiple episodes of high-grade fever , tachycardia and hypotension while on the strongest broad-spectrum antibiotics and antifungal  -stat CT abdomen showing perforation with free intraperitoneal air, also findings concerning for necrotizing fasciitis   -general surgery on board   -transferred to ICU for close monitoring  -exploratory laparotomy planned in the OR  -pain control  - strict NPO  -holding of IV fluids with worsening hypoxia and his underlying fluid overload/ESRD state.  - consider Clinimix if he continues to be NPO

## 2024-09-26 NOTE — ASSESSMENT & PLAN NOTE
Chronic, controlled. Latest blood pressure and vitals reviewed-     Home meds for hypertension were reviewed and noted below.   Hypertension Medications               cloNIDine (CATAPRES) 0.3 MG tablet TAKE 1 TABLET BY MOUTH THREE TIMES DAILY    hydrALAZINE (APRESOLINE) 100 MG tablet Take 1 tablet (100 mg total) by mouth 3 (three) times daily.    isosorbide mononitrate (IMDUR) 120 MG 24 hr tablet Take 120 mg by mouth once daily.    metoprolol succinate (TOPROL-XL) 50 MG 24 hr tablet Take 150 mg by mouth once daily.    olmesartan (BENICAR) 40 MG tablet Take 1 tablet by mouth once daily            Hold all oral meds given his low blood pressures, with underlying perforation, pending exploratory lap

## 2024-09-26 NOTE — SUBJECTIVE & OBJECTIVE
Interval History:  Overnight had worsening leukocytosis, fevers up to 103.5, hypotensive episodes, worsening hypoxia/mental status that required brief BiPAP placement overnight.  Stat CT abdomen ordered.  Prelim read-intraperitoneal free air, findings suggestive of necrotizing fasciitis, General surgery made aware, transferring to ICU.  Plan for exploratory laparotomy in the OR.    Review of Systems has worsening abdominal pain and distention.    Objective:     Vital Signs (Most Recent):  Temp: (!) 102 °F (38.9 °C) (09/25/24 2016)  Pulse: (!) 142 (09/25/24 2100)  Resp: (!) 25 (09/25/24 2015)  BP: (!) 106/54 (09/25/24 2100)  SpO2: 100 % (09/25/24 2100) Vital Signs (24h Range):  Temp:  [99.1 °F (37.3 °C)-103.5 °F (39.7 °C)] 102 °F (38.9 °C)  Pulse:  [] 142  Resp:  [12-29] 25  SpO2:  [93 %-100 %] 100 %  BP: ()/(49-82) 106/54  Arterial Line BP: (102-204)/(47-73) 114/51     Weight: 134 kg (295 lb 6.7 oz)  Body mass index is 37.93 kg/m².    Intake/Output Summary (Last 24 hours) at 9/25/2024 2104  Last data filed at 9/25/2024 1815  Gross per 24 hour   Intake 2414.7 ml   Output 498 ml   Net 1916.7 ml      Physical Exam  Vitals and nursing note reviewed.   Constitutional:       General: He is in acute distress.     Appearance: He is obese. He is toxic-appearing.   HENT:      Head: Atraumatic.      Mouth/Throat:      Mouth: Mucous membranes are moist.      Pharynx: Oropharynx is clear.   Eyes:      General: No scleral icterus.     Conjunctiva/sclera: Conjunctivae normal.      Pupils: Pupils are equal, round, and reactive to light.   Cardiovascular:      Rate and Rhythm: Regular rhythm.      Heart sounds: No murmur heard.  Pulmonary:      Effort: No respiratory distress.      Breath sounds: No wheezing, rhonchi or rales.   Patient is unable to take deep breath with worsening abdominal pain  Abdominal:      General: Abdomen is flat. Bowel sounds are normal.  Abdominal tenderness present that has much worsened  compared to yesterday, guarding present.  Bowel sounds inaudible   Genitourinary:     Comments: There is left groin dressing and also laparoscopic scars in the abdomen , has scrotal swelling, non-tender Peritesticular region, inflammation present-as per urology exam  Musculoskeletal:         General: No swelling or deformity.      Cervical back: No rigidity or tenderness.   Left upper extremity with fistula in a bandage  Skin:     Coloration: Skin is not jaundiced or pale.      Findings: No bruising, erythema or rash.   Neurological:      General: No focal deficit present.      Mental Status: He is alert and oriented to person, place, and time at the time of my examination.      Cranial Nerves: No cranial nerve deficit.      Sensory: No sensory deficit.      Motor: No weakness.   Psychiatric:         Mood and Affect: Mood normal.         Behavior: Behavior normal    Significant Labs: All pertinent labs within the past 24 hours have been reviewed.  CBC:   Recent Labs   Lab 09/24/24  1122 09/25/24  0236 09/25/24  1737   WBC 18.77* 17.03* 22.95*   HGB 7.1* 7.2* 9.5*   HCT 22.6* 22.2* 29.4*    389 470*     CMP:   Recent Labs   Lab 09/24/24  1122 09/25/24  0236 09/25/24  1737    137 136   K 4.2 4.0 4.4   CL 93* 97 98   CO2 22* 25 18*    136* 108   BUN 75* 46* 51*   CREATININE 17.7* 13.0* 13.4*   CALCIUM 8.8 8.9 8.7   PROT 7.3 7.2 7.4   ALBUMIN 3.0* 3.1* 3.0*   BILITOT 0.5 0.6 0.9   ALKPHOS 113 161* 152*   AST 37 64* 49*   ALT 31 48* 45*   ANIONGAP 21* 15 20*       Significant Imaging:     CT abdomen and pelvis without contrast:  Left inguinal hernia containing fat and air as well as marked inflammation extending from inside the pelvis, in the hernia and down into the left scrotum.  This is consistent with an infected inguinal hernia, possible gangrene.  Diverticulosis coli without CT evidence of diverticulitis.  2.4 cm cyst of the left kidney.  Small kidneys noted    CXR: No acute abnormality.      CXR:  Central line inserted ending in the superior vena cava. Hypoventilation.     Imaging Results              X-Ray Chest AP Portable (Final result)  Result time 09/17/24 10:38:03      Final result by Everett Castellano Jr., MD (09/17/24 10:38:03)                   Impression:      No acute abnormality.      Electronically signed by: Everett Castellano MD  Date:    09/17/2024  Time:    10:38               Narrative:    EXAMINATION:  XR CHEST AP PORTABLE    CLINICAL HISTORY:  End stage renal disease    TECHNIQUE:  Single frontal view of the chest was performed.    COMPARISON:  Chest x-ray of July 15, 2024    FINDINGS:  The lungs are clear, with normal appearance of pulmonary vasculature and no pleural effusion or pneumothorax.    The cardiac silhouette is normal in size. The hilar and mediastinal contours are unremarkable.    Bones are intact.                                       CT Abdomen Pelvis  Without Contrast (Final result)  Result time 09/17/24 09:31:56      Final result by Everett Castellano Jr., MD (09/17/24 09:31:56)                   Impression:      Left inguinal hernia containing fat and air as well as marked inflammation extending from inside the pelvis, in the hernia and down into the left scrotum.  This is consistent with an infected inguinal hernia, possible gangrene.    Diverticulosis coli without CT evidence of diverticulitis.  2.4 cm cyst of the left kidney.  Small kidneys noted    These results were telephoned to Dr. Hurst, Emergency Department on 17 September 2024 at 09:30      Electronically signed by: Everett Castellano MD  Date:    09/17/2024  Time:    09:31               Narrative:    EXAMINATION:  CT ABDOMEN PELVIS WITHOUT CONTRAST    CLINICAL HISTORY:  left inguinal pain, c/f hernia;    TECHNIQUE:  Low dose axial images, sagittal and coronal reformations were obtained from the lung bases to the pubic symphysis.    COMPARISON:  None    FINDINGS:  The liver is of normal size contour  and CT density without focal defect.  The gallbladder is of normal size without CT evidence of stone.  The pancreas is of normal contour and CT density without edema or mass.  The spleen is small but of normal CT density.  A small accessory spleen is noted.    The adrenal glands are not enlarged.  The kidneys are small.  There is a low-density 2.4 cm probable cyst of the lateral surface of the midpole of the left kidney.  Stone or hydronephrosis is not seen on either side.  The abdominal aorta and inferior vena cava are of normal caliber.    The stomach is of normal configuration.  Small bowel dilatation or air-fluid levels are not seen.  There is diverticulosis of the descending and sigmoid colon without CT evidence of diverticulitis.  A normal appendix is noted.  Free fluid or free air in the peritoneum is not seen.    In the right groin there is significant inflammation of a inguinal hernia containing fat and air.  There is also edema of the left scrotum and adenopathy in the left groin the largest node having a short axis 1.3 cm.  Similar finding is not seen on the right.                                        Physical Exam

## 2024-09-26 NOTE — ASSESSMENT & PLAN NOTE
Nephrology consulted for routine dialysis MWF, AVF is not functional and tiralysis catheter was placed initially.    Reconsulted vascular surgery Dr. Salvador , fistulogram declotting done on 9/24  Remove trialysis catheter today  Renal dose medications   On HD

## 2024-09-26 NOTE — PLAN OF CARE
Problem: Physical Therapy  Goal: Physical Therapy Goal  Description: 1. Supine to sit with Stand-by Assistance  2. Sit to stand transfer with Stand-by Assistance  3.. Bed to chair transfer with Supervision using Rolling Walker  4. Gait  x 100 feet with Minimal Assistance using least restrictive assistive device.     Outcome: Progressing

## 2024-09-26 NOTE — CARE UPDATE
09/26/24 0726   Patient Assessment/Suction   Level of Consciousness (AVPU) alert   Respiratory Effort Unlabored   Expansion/Accessory Muscles/Retractions no use of accessory muscles   All Lung Fields Breath Sounds clear   LLL Breath Sounds diminished   RLL Breath Sounds diminished   Rhythm/Pattern, Respiratory assisted mechanically   Cough Frequency no cough   Skin Integrity   $ Wound Care Tech Time 15 min   Area Observed Bridge of nose   Skin Appearance without discoloration   PRE-TX-O2   Device (Oxygen Therapy) BIPAP   $ Is the patient on High Flow Oxygen? Yes   Oxygen Concentration (%) 40   SpO2 100 %   Pulse Oximetry Type Continuous   $ Pulse Oximetry - Multiple Charge Pulse Oximetry - Multiple   Pulse 103   Resp 13   BP (!) 162/72   Preset CPAP/BiPAP Settings   Mode Of Delivery BiPAP S/T   $ CPAP/BiPAP Daily Charge BiPAP/CPAP Daily   $ Initial CPAP/BiPAP Setup? No   $ Is patient using? Yes   Size of Mask Large   Sized Appropriately? Yes   Equipment Type V60   Ipap 18   EPAP (cm H2O) 14   Pressure Support (cm H2O) 4   Set Rate (Breaths/Min) 12   ITime (sec) 1   Rise Time (sec) 3   Patient CPAP/BiPAP Settings   RR Total (Breaths/Min) 19   Tidal Volume (mL) 500   VE Minute Ventilation (L/min) 9.4 L/min   Peak Inspiratory Pressure (cm H2O) 18   TiTOT (%) 31   Total Leak (L/Min) 19   Patient Trigger - ST Mode Only (%) 93   CPAP/BiPAP Backup Settings   IPAP Backup 18 cmH2O   EPAP Backup 14 cmH2O   Backup Rate 12 breaths per minute (bpm)   FIO2 Backup 40 %   ITIME Backup 1 seconds   Rise Time Backup 3 seconds   CPAP/BiPAP Alarms   High Pressure (cm H2O) 40   Low Pressure (cm H2O) 5   Minute Ventilation (L/Min) 3   High RR (breaths/min) 45   Low RR (breaths/min) 8   Apnea (Sec) 20   Education   $ Education BiPAP;15 min

## 2024-09-26 NOTE — PROGRESS NOTES
[facility]   Department of Infectious Disease  Progress Note        PATIENT NAME: João Ham  MRN: [id number]  TODAY'S DATE: 09/26/2024  ADMIT DATE: 9/17/2024  LOS: 9 days    CHIEF COMPLAINT: Fatigue and Groin Swelling (X 2 days.  Hx of Kidney failure and CHF)      PRINCIPLE PROBLEM: Inguinal hernia of left side with gangrene    INTERVAL HISTORY      09/23/2024: Seen and evaluated at bedside.  No acute issues.  Leukocytosis persists.  Afebrile.      09/24/2024:  Leukocytosis.  Remains afebrile.  Fluid in BERNABE drain remains dark green.  Seen by vascular surgeon with plan for left upper extremity AV fistula fistulogram.    09/25/2024: Leukocytosis persists.  He is otherwise stable.  Left inguinal drainage fluid sent for Gram stain and culture.  G stain was negative.  Culture in progress.  He remains on broad-spectrum antimicrobials.  He was seen by urologist yesterday with plan for conservative management of left scrotal edema with orchitis.  Repeat CT this morning showed gas in the abdomen resident concern for anastomotic leak.     09/26/2024:  He had exploratory laparotomy yesterday with colon resection and end colostomy, mobilization of splenic flexure and left inguinal canal exploration.  A Penrose drain was placed in the inguinal canal.  Left IJ Vas-Cath was removed yesterday.    Antibiotics (From admission, onward)      Start     Stop Route Frequency Ordered    09/24/24 1800  piperacillin-tazobactam 4.5 g in dextrose 5 % 100 mL IVPB (ready to mix)         -- IV Every 12 hours (non-standard times) 09/24/24 1035    09/23/24 1800  DAPTOmycin (CUBICIN) 1,340 mg in 0.9% NaCl SolP 50 mL IVPB         -- IV Every 48 hours (non-standard times) 09/22/24 1758    09/22/24 1800  DAPTOmycin (CUBICIN) 1,340 mg in 0.9% NaCl SolP 50 mL IVPB         -- IV Once 09/22/24 1801    09/17/24 2100  mupirocin 2 % ointment         09/22/24 2059 Nasl 2 times daily 09/17/24 1845          Antifungals (From admission, onward)       Start     Stop Route Frequency Ordered    09/22/24 1930  fluconazole (DIFLUCAN) IVPB 200 mg         -- IV Every 24 hours (non-standard times) 09/22/24 1520           Antivirals (From admission, onward)      None            ASSESSMENT and PLAN      1. Incarcerated left inguinal hernia with necrosis and left inguinal abscess.  Had I&D of abscess, partial sigmoid colectomy with primary anastomosis on 09/17/2024.  Developed leukocytosis 09/20/2024 which has worsened.  Antibiotics modified 09/22/2024.  CT abdomen and pelvis 09/22/2024 with foci of gas at the anastomotic site.  Repeat CT abdomen and pelvis 09/24/2024 showing fluid collection with gas concerning for anastomotic leak.  For exploratory laparotomy today.    2. Leukocytosis.  As above    3. Scrotal edema worse on the left.  Diagnosed with orchitis by urologist.  Consider possibility of scrotal I&D in OR same time as laparotomy     4. ESRD on hemodialysis Monday/ Wednesday/Friday.  He is on transplant list.  Currently has a nonfunctioning left upper extremity AV fistula.    RECOMMENDATIONS:    Continue current antibiotics   For laparotomy today  Follow drainage culture    Please send Epic secure chat with any questions.  Discussed with general surgeon, hospitalist, pulmonologist and nephrologist.      SUBJECTIVE      Antibiotics   Vancomycin: 09/17/2024-09/20/2024   Clindamycin:  09/07/2020 04/09/2020 2/24   Cefepime: 09/07/2024-09/22/2024   Daptomycin: 09/22/2024-  Fluconazole: 09/22/2024-  Zosyn: 09/22/2024-    Microbiology  Blood culture 09/07/2024: NGTD   Urine culture 09/19/2024: NGTD   Blood culture 09/24/2024: In progress    Review of Systems  Negative except as stated above in Interval History     OBJECTIVE   Temp:  [99.3 °F (37.4 °C)-102.6 °F (39.2 °C)] 100.8 °F (38.2 °C)  Pulse:  [] 103  Resp:  [12-29] 18  SpO2:  [89 %-100 %] 100 %  BP: (105-175)/(54-82) 162/72  Arterial Line BP: ()/(46-76) 145/71  Temp:  [99.3 °F (37.4 °C)-102.6 °F (39.2  °C)]   Temp: (!) 100.8 °F (38.2 °C) (09/26/24 0701)  Pulse: 103 (09/26/24 0726)  Resp: 18 (09/26/24 0801)  BP: (!) 162/72 (09/26/24 0726)  SpO2: 100 % (09/26/24 0726)    Intake/Output Summary (Last 24 hours) at 9/26/2024 0842  Last data filed at 9/26/2024 0620  Gross per 24 hour   Intake 3136.53 ml   Output 3018 ml   Net 118.53 ml       Physical Exam  General:  Middle-aged man who is ill-looking.    Abdomen:  Distended.  Dry dressing midline.  Colostomy left lower abdomen with BERNABE drain.  Dressing left groin area.  Genitals:  From Edematous scrotum worse on the left.  Nontender  CVS: S1 and 2 heard, no murmurs appreciated   Respiratory: Clear to auscultation   Skin: No rash appreciated   Musculoskeletal system: No joint or bony abnormalities appreciated   CNS: No gross focal deficits  Psych: Good mood, normal affect.    VAD:  Right radial arterial line, PIV  ISOLATION:  None    WOUNDS:  Abdominal surgical wounds    Significant Labs: All pertinent labs within the past 24 hours have been reviewed.    CBC LAST 7 DAYS  Recent Labs   Lab 09/20/24  0153 09/21/24  0558 09/22/24  0743 09/23/24  0918 09/24/24  1122 09/25/24  0236 09/25/24  1737 09/26/24  0501   WBC 13.02* 13.43* 16.59* 17.64* 18.77* 17.03* 22.95* 20.66*   RBC 2.35* 2.50* 2.41* 2.61* 2.29* 2.26* 3.13* 3.00*   HGB 7.3* 7.6* 7.6* 8.0* 7.1* 7.2* 9.5* 8.9*   HCT 22.5* 24.4* 24.2* 25.4* 22.6* 22.2* 29.4* 27.7*   MCV 96 98 100* 97 99* 98 94 92   MCH 31.1* 30.4 31.5* 30.7 31.0 31.9* 30.4 29.7   MCHC 32.4 31.1* 31.4* 31.5* 31.4* 32.4 32.3 32.1   RDW 20.7* 20.0* 20.0* 19.9* 20.4* 20.0* 22.7* 23.3*    249 232 357 384 389 470* 443   MPV 10.3 10.7 11.7 10.5 10.3 10.3 10.3 10.5   GRAN 76.0* 55.0 58.0 77.0* 70.0 81.0* 79.0*  --    LYMPH 7.0* 15.0* 7.0* 12.0*  CANCELED 5.0* 3.0* 8.0*  --    MONO 5.0 14.0 10.0 2.0*  CANCELED 12.0 2.0* 2.0*  --    BASO  --   --   --  CANCELED  --   --   --   --    NRBC 0 0 0 0 0 1* 0  --        CHEMISTRY LAST 7 DAYS  Recent Labs   Lab  "09/21/24  0558 09/22/24  0743 09/23/24  0918 09/24/24  1122 09/25/24  0236 09/25/24  1051 09/25/24  1737 09/26/24  0501   * 134* 132* 136 137  --  136 135*   K 4.5 4.9 4.0 4.2 4.0  --  4.4 5.3*   CL 97 97 93* 93* 97  --  98 96   CO2 24 21* 22* 22* 25  --  18* 24   ANIONGAP 13 16 17* 21* 15  --  20* 15   BUN 38* 54* 61* 75* 46*  --  51* 39*   CREATININE 11.6* 14.5* 16.0* 17.7* 13.0*  --  13.4* 11.4*    100 120* 101 136*  --  108 110   CALCIUM 8.5* 8.7 9.1 8.8 8.9  --  8.7 8.7   PH  --   --   --   --   --  7.404  --   --    MG 1.9 2.1 2.2 2.3 2.2  --  2.2 2.0   ALBUMIN 3.1* 3.2* 3.2* 3.0* 3.1*  --  3.0* 2.9*   PROT 7.0 7.5 7.5 7.3 7.2  --  7.4 7.3   ALKPHOS 77 87 114 113 161*  --  152* 124   ALT 11 13 26 31 48*  --  45* 37   AST 22 33 46* 37 64*  --  49* 38   BILITOT 0.5 0.4 0.5 0.5 0.6  --  0.9 0.7       Estimated Creatinine Clearance: 12.2 mL/min (A) (based on SCr of 11.4 mg/dL (H)).    INFLAMMATORY/PROCAL  LAST 7 DAYS  Recent Labs   Lab 09/20/24  0152 09/24/24  1514   PROCAL  --  13.932*   CRP >16.00* 35.70*     No results found for: "ESR"  CRP   Date Value Ref Range Status   09/24/2024 35.70 (H) <1.00 mg/dL Final     Comment:     CRP-Normal Application expected values:   <1.0        mg/dL   Normal Range  1.0 - 5.0  mg/dL   Indicates mild inflammation  5.0 - 10.0 mg/dL   Indicates severe inflammation  >10.0        mg/dL   Represents serious processes and   frequently         indicates the presence of a bacterial   infection.      09/20/2024 >16.00 (H) <1.00 mg/dL Final     Comment:     CRP-Normal Application expected values:   <1.0        mg/dL   Normal Range  1.0 - 5.0  mg/dL   Indicates mild inflammation  5.0 - 10.0 mg/dL   Indicates severe inflammation  >10.0        mg/dL   Represents serious processes and   frequently         indicates the presence of a bacterial   infection.      07/16/2024 >16.00 (H) <1.00 mg/dL Final     Comment:     CRP-Normal Application expected values:   <1.0        mg/dL   " Normal Range  1.0 - 5.0  mg/dL   Indicates mild inflammation  5.0 - 10.0 mg/dL   Indicates severe inflammation  >10.0        mg/dL   Represents serious processes and   frequently         indicates the presence of a bacterial   infection.          PRIOR  MICROBIOLOGY:    Susceptibility data from last 90 days.  Collected Specimen Info Organism Ceftriaxone Clindamycin Erythromycin Oxacillin Penicillin Tetracycline Trimeth/Sulfa Vancomycin   09/17/24 Blood from Peripheral, Antecubital, Right No growth after 5 days.           09/17/24 Blood from Peripheral, Antecubital, Right No growth after 5 days.           07/19/24 Catheter Tip, Tunneled Staphylococcus aureus  S  S  I  S  R  S  S  S   07/16/24 Blood from Peripheral, Forearm, Right Staphylococcus aureus           07/15/24 Blood from Peripheral, Antecubital, Right Staphylococcus aureus  S  S  S  S  R  S  S  S   07/15/24 Blood from Peripheral, Hand, Right Staphylococcus aureus               LAST 7 DAYS MICROBIOLOGY   Microbiology Results (last 7 days)       Procedure Component Value Units Date/Time    Aerobic culture [[id number]] Collected: 09/25/24 1413    Order Status: Completed Specimen: Incision site from Abdomen Updated: 09/26/24 0805     Aerobic Bacterial Culture No growth    Aerobic culture [[id number]] Collected: 09/24/24 1535    Order Status: Completed Specimen: Incision site from Groin Updated: 09/26/24 0755     Aerobic Bacterial Culture No growth    Narrative:      Left groin incision site drainage swab    Blood culture [[id number]] Collected: 09/25/24 1111    Order Status: Completed Specimen: Blood from Peripheral, Antecubital, Right Updated: 09/25/24 1917     Blood Culture, Routine No Growth to date    Narrative:      [id number]    Blood culture [[id number]] Collected: 09/25/24 1111    Order Status: Completed Specimen: Blood from Peripheral, Forearm, Right Updated: 09/25/24 1917     Blood Culture, Routine No Growth to date    Narrative:      [id number]    Culture,  Anaerobe [[id number]] Collected: 09/25/24 1413    Order Status: Sent Specimen: Incision site from Abdomen Updated: 09/25/24 1653    AFB Culture & Smear [[id number]] Collected: 09/25/24 1413    Order Status: Sent Specimen: Incision site from Abdomen Updated: 09/25/24 1652    Gram stain [[id number]] Collected: 09/25/24 1413    Order Status: Sent Specimen: Incision site from Abdomen Updated: 09/25/24 1652    Fungus culture [[id number]] Collected: 09/25/24 1413    Order Status: Sent Specimen: Incision site from Abdomen Updated: 09/25/24 1652    Blood culture [[id number]] Collected: 09/24/24 1513    Order Status: Completed Specimen: Blood Updated: 09/25/24 1632     Blood Culture, Routine No Growth to date      No Growth to date    Narrative:      Collection has been rescheduled by [email or web address] at 09/23/2024 14:35 Reason:   Patient unavailable dialysis   Collection has been rescheduled by MYB at 09/23/2024 18:44 Reason:   Unable to collect  Collection has been rescheduled by CZB at 09/23/2024 19:02 Reason:   Unable to collect  Collection has been rescheduled by RE1 at 09/24/2024 09:43 Reason:   Spoke to the patient's nurse about unable to collect she is   contacting Lambert and the doctor  Collection has been rescheduled by [email or web address] at 09/23/2024 14:35 Reason:   Patient unavailable dialysis   Collection has been rescheduled by MYB at 09/23/2024 18:44 Reason:   Unable to collect  Collection has been rescheduled by CZB at 09/23/2024 19:02 Reason:   Unable to collect  Collection has been rescheduled by RE1 at 09/24/2024 09:43 Reason:   Spoke to the patient's nurse about unable to collect she is   contacting Lambert and the doctor    Blood culture [[id number]] Collected: 09/24/24 1122    Order Status: Completed Specimen: Blood Updated: 09/25/24 1232     Blood Culture, Routine No Growth to date      No Growth to date    Narrative:      Collection has been rescheduled by [email or web address] at 09/23/2024 14:35 Reason:   Patient unavailable dialysis    Collection has been rescheduled by MYB at 09/23/2024 18:44 Reason:   Unable to collect  Collection has been rescheduled by CZB at 09/23/2024 19:02 Reason:   Unable to collect  Collection has been rescheduled by RE1 at 09/24/2024 09:43 Reason:   Spoke to the patient's nurse about unable to collect she is   contacting Lambert and the doctor  Collection has been rescheduled by [email or web address] at 09/23/2024 14:35 Reason:   Patient unavailable dialysis   Collection has been rescheduled by MYB at 09/23/2024 18:44 Reason:   Unable to collect  Collection has been rescheduled by CZB at 09/23/2024 19:02 Reason:   Unable to collect  Collection has been rescheduled by RE1 at 09/24/2024 09:43 Reason:   Spoke to the patient's nurse about unable to collect she is   contacting Lambert and the doctor    IV catheter culture [[id number]] Collected: 09/25/24 1132    Order Status: Sent Specimen: Catheter Tip, Dialysis Updated: 09/25/24 1141    Gram stain [[id number]] Collected: 09/24/24 1535    Order Status: Completed Specimen: Incision site from Groin Updated: 09/24/24 1829     Gram Stain Result Few WBC's      No organisms seen    Narrative:      Left groin incision site drainage    Blood culture x two cultures. Draw prior to antibiotics. [[id number]] Collected: 09/17/24 0949    Order Status: Completed Specimen: Blood from Peripheral, Antecubital, Right Updated: 09/22/24 1632     Blood Culture, Routine No growth after 5 days.    Narrative:      Aerobic and anaerobic    Blood culture x two cultures. Draw prior to antibiotics. [[id number]] Collected: 09/17/24 0949    Order Status: Completed Specimen: Blood from Peripheral, Antecubital, Right Updated: 09/22/24 1632     Blood Culture, Routine No growth after 5 days.    Narrative:      Aerobic and anaerobic    Urine culture [[id number]] Collected: 09/19/24 1435    Order Status: Completed Specimen: Urine Updated: 09/22/24 0740     Urine Culture, Routine No growth    Narrative:      Specimen Source->Urine             CURRENT/PREVIOUS VISIT EKG  Results for orders placed or performed during the hospital encounter of 09/17/24   EKG 12-lead    Collection Time: 09/17/24  8:57 AM   Result Value Ref Range    QRS Duration 92 ms    [facility] QTC Calculation 469 ms    Narrative    Test Reason : N18.6,    Vent. Rate : 119 BPM     Atrial Rate : 119 BPM     P-R Int : 134 ms          QRS Dur : 092 ms      QT Int : 334 ms       P-R-T Axes : 027 -78 004 degrees     QTc Int : 469 ms    Sinus tachycardia  Left axis deviation  Cannot rule out Anterior infarct ,age undetermined  Abnormal ECG  When compared with ECG of 15-JUL-2024 17:47,  T wave inversion now evident in Inferior leads  T wave inversion no longer evident in Lateral leads  Confirmed by Jose Cruz MD ([id number]) on 9/22/2024 1:02:21 PM    Referred By: BRANDON   SELF           Confirmed By:Jose Cruz MD     Significant Imaging: I have reviewed all relevant and available imaging results/findings within the past 24 hours.    I spent a total of 55 minutes on the day of the visit.This includes face to face time and non-face to face time preparing to see the patient (eg, review of tests), obtaining and/or reviewing separately obtained history, documenting clinical information in the electronic or other health record, independently interpreting results and communicating results to the patient/family/caregiver, or care coordinator.    Kerwin Au MD  Date of Service: 09/26/2024      This note was created using [employer] voice recognition software that occasionally misinterpreted phrases or words.

## 2024-09-26 NOTE — NURSING
Nurses Note -- 4 Eyes      9/26/2024   10:54 AM      Skin assessed during: Daily Assessment      [] No Altered Skin Integrity Present    [x]Prevention Measures Documented      [x] Yes- Altered Skin Integrity Present or Discovered   [] LDA Added if Not in Epic (Describe Wound)   [] New Altered Skin Integrity was Present on Admit and Documented in LDA   [] Wound Image Taken    Wound Care Consulted? Yes    Attending Nurse:  Renae Momin RN    Second RN/Staff Member:  ELI Abdalla

## 2024-09-26 NOTE — TELEPHONE ENCOUNTER
----- Message from Neil Saunders sent at 9/26/2024  8:28 AM CDT -----  Contact:   Type: Needs Medical Advice    Who Called:   - [facility] [employer]    Best Call Back Number: [phone]    Additional Information:  states that he faxed some paperwork for patient on Sept 23rd and is asking if it has been received

## 2024-09-26 NOTE — ASSESSMENT & PLAN NOTE
Colonic perforation with abscess  CT abdomen shows Left inguinal hernia containing fat and air as well as marked inflammation extending from inside the pelvis, in the hernia and down into the left scrotum. This is consistent with an infected inguinal hernia, possible gangrene.   Patient was taken to OR and found to have colonic perforation due to mesh erosion. Patient underwent sigmoid resection and I&D on 9/17    Until 9/24  - repeat episodes of fever, worsening leukocytosis, on exam mild abdominal distention, abdominal pain-KUB does not show any signs of ileus/SBO  - blood cultures and OR culture no growth till date  - consulted ID, recommended to discontinue vancomycin, clindamycin, cefepime   - started on daptomycin, Diflucan and Zosyn  -repeated CT abdomen and pelvis 9/23, showing postoperative changes versus abscess/phlegmon material and contained perforation  -reconsulted surgery, mentioned expected postoperative changes  -9/25 as above

## 2024-09-26 NOTE — PT/OT/SLP EVAL
Occupational Therapy   Evaluation    Name: João Ham  MRN: [id number]  Admitting Diagnosis: Inguinal hernia of left side with gangrene  Recent Surgery: Procedure(s) (LRB):  LAPAROTOMY, EXPLORATORY (N/A)  COLON RESECTION  MOBILIZATION, SPLENIC FLEXURE 1 Day Post-Op    Recommendations:     Discharge Recommendations: High Intensity Therapy  Discharge Equipment Recommendations:   (TBD)  Barriers to discharge:  None    Assessment:     João Ham is a 44 y.o. male with a medical diagnosis of Inguinal hernia of left side with gangrene.  He presents with general weakness s/p ex lap colon resection with colostomy. Performance deficits affecting function: weakness, impaired endurance, impaired self care skills, impaired functional mobility, gait instability, impaired balance, decreased safety awareness, decreased lower extremity function, decreased upper extremity function.      Rehab Prognosis: Good; patient would benefit from acute skilled OT services to address these deficits and reach maximum level of function.       Plan:     Patient to be seen 5 x/week to address the above listed problems via self-care/home management, therapeutic activities, therapeutic exercises  Plan of Care Expires: 10/26/24  Plan of Care Reviewed with: patient    Subjective     Chief Complaint: General weakness  Patient/Family Comments/goals: Improved functional mobility and ADL independence.    Occupational Profile:  Living Environment: lives alone in apartment.  Previous level of function: independent with ADLs, IADLs and driving.  Roles and Routines: Primary homemaker  Equipment Used at Home: none  Assistance upon Discharge: Family    Pain/Comfort:  Pain Rating 1: 10/10  Location 1: abdomen  Pain Addressed 1: Distraction, Reposition  Pain Rating Post-Intervention 1: 10/10    Patients cultural, spiritual, Taoism conflicts given the current situation: no    Objective:     Communicated with: nurse prior to session.  Patient found up in chair  with telemetry, peripheral IV, oxygen, arterial line, BERNABE drain upon OT entry to room.    General Precautions: Standard, fall  Orthopedic Precautions: N/A  Braces: N/A  Respiratory Status: Nasal cannula, flow 4 L/min    Occupational Performance:    Activities of Daily Living:  Grooming: minimum assistance to wash face sitting EOB.    Cognitive/Visual Perceptual:  Cognitive/Psychosocial Skills:     -       Oriented to: Person, Place, and Situation   -       Follows Commands/attention:Follows two-step commands  -       Communication: soft spoken, but intelligible  -       Memory: No Deficits noted  -       Safety awareness/insight to disability: impaired   -       Mood/Affect/Coping skills/emotional control: Cooperative, Flat affect, and Pleasant  Visual/Perceptual:      -Intact Acuity    Physical Exam:  Upper Extremity Range of Motion:     -       Right Upper Extremity: WFL  -       Left Upper Extremity: WFL  Upper Extremity Strength:    -       Right Upper Extremity: 3+/5  -       Left Upper Extremity: 3+/5   Strength:    -       Right Upper Extremity: WFL  -       Left Upper Extremity: WFL  Fine Motor Coordination:    -       Intact    AMPAC 6 Click ADL:  AMPAC Total Score:      Treatment & Education:  Patient educated on the purpose of Occupational Therapy and the importance of getting OOB.    Patient left up in chair with all lines intact and call button in reach    GOALS:   Multidisciplinary Problems       Occupational Therapy Goals          Problem: Occupational Therapy    Goal Priority Disciplines Outcome Interventions   Occupational Therapy Goal     OT, PT/OT     Description: Goals to be met by: 10/26/2024     Patient will increase functional independence with ADLs by performing:    UE Dressing with Supervision.  LE Dressing with Stand-by Assistance.  Grooming while standing at sink with Supervision.  Toileting from toilet with Supervision for hygiene and clothing management.   Supine to sit with Stand-by  Assistance.  Toilet transfer to toilet with Supervision.                         History:     Past Medical History:   Diagnosis Date    Allergy     Anemia     Anxiety     CHF (congestive heart failure)     Depression     High blood pressure with chronic kidney disease, stage 5 chronic kidney disease or end stage renal disease     Hypertension          Past Surgical History:   Procedure Laterality Date    ABSCESS DRAINAGE Left 9/17/2024    Procedure: DRAINAGE, ABSCESS, GROIN;  Surgeon: Galileo Connors MD;  Location: [facility] OR;  Service: General;  Laterality: Left;    COLON RESECTION  9/25/2024    Procedure: COLON RESECTION;  Surgeon: Galileo Connors MD;  Location: [facility] OR;  Service: General;;    FISTULOGRAM Bilateral 4/30/2024    Procedure: Fistulogram;  Surgeon: Khoobehi, Ali, MD;  Location: [facility] CATH/EP LAB;  Service: Vascular;  Laterality: Bilateral;    HERNIA REPAIR Right     With mesh    INSERTION, CATHETER, TUNNELED N/A 6/22/2023    Procedure: Insertion,catheter,tunneled;  Surgeon: Everett Caicedo MD;  Location: [facility] OR;  Service: General;  Laterality: N/A;    LAPAROTOMY, EXPLORATORY N/A 9/25/2024    Procedure: LAPAROTOMY, EXPLORATORY;  Surgeon: Galileo Connors MD;  Location: [facility] OR;  Service: General;  Laterality: N/A;    MOBILIZATION OF SPLENIC FLEXURE  9/25/2024    Procedure: MOBILIZATION, SPLENIC FLEXURE;  Surgeon: Galileo Connors MD;  Location: [facility] OR;  Service: General;;    PERCUTANEOUS TRANSLUMINAL ANGIOPLASTY OF ARTERIOVENOUS FISTULA Left 4/30/2024    Procedure: PTA, AV FISTULA;  Surgeon: Khoobehi, Ali, MD;  Location: [facility] CATH/EP LAB;  Service: Vascular;  Laterality: Left;    PHLEBOGRAPHY N/A 7/19/2024    Procedure: Venogram;  Surgeon: Khoobehi, Ali, MD;  Location: [facility] CATH/EP LAB;  Service: Vascular;  Laterality: N/A;    REMOVAL, TUNNELED CATH Right 7/19/2024    Procedure: REMOVAL, TUNNELED CATH;  Surgeon: Khoobehi, Ali, MD;  Location: [facility] CATH/EP LAB;  Service: Vascular;  Laterality:  Right;    ROBOT-ASSISTED LAPAROSCOPIC RESECTION OF SIGMOID COLON USING DA DELANO XI N/A 9/17/2024    Procedure: XI ROBOTIC SIGMOID RESECTION, WITH ANASTAMOSIS;  Surgeon: Galileo Connors MD;  Location: [facility];  Service: General;  Laterality: N/A;  possible open have instruments available       Time Tracking:     OT Date of Treatment: 09/26/24  OT Start Time: 1141  OT Stop Time: 1151  OT Total Time (min): 10 min    Billable Minutes:Evaluation 10    9/26/2024

## 2024-09-26 NOTE — PT/OT/SLP EVAL
Physical Therapy Evaluation    Patient Name:  João Ham   MRN:  [id number]    Recommendations:     Discharge Recommendations: High Intensity Therapy   Discharge Equipment Recommendations: to be determined by next level of care   Barriers to discharge:  medical status, increased assist with mobility    Assessment:     João Ham is a 44 y.o. male admitted with a medical diagnosis of Inguinal hernia of left side with gangrene.  He presents with the following impairments/functional limitations: weakness, impaired self care skills, impaired functional mobility, gait instability, impaired balance, impaired cognition, decreased lower extremity function, pain, impaired skin, impaired cardiopulmonary response to activity.  Pt found drowsy and in bed with HOB elevated and grandmother present at bedside.  Pt opens eyes to sound of PT voice and is agreeable to PT eval and attempt OOB.  Pt requires modA for mobility today, having significant pain and also limited by abdomen wound draining. RN present to manage wound and lines/tubes.  Pt with significant weakness in bilat LE but high motivation for participation in therapy and to get better and restore independence/PLOF. He is a good candidate for high intensity therapy following post acute stay due to severe deficits from baseline and high motivational level for participation regardless of pain or other limiting factors.    Rehab Prognosis: Fair; patient would benefit from acute skilled PT services to address these deficits and reach maximum level of function.    Recent Surgery: Procedure(s) (LRB):  LAPAROTOMY, EXPLORATORY (N/A)  COLON RESECTION  MOBILIZATION, SPLENIC FLEXURE 1 Day Post-Op    Plan:     During this hospitalization, patient to be seen daily to address the identified rehab impairments via gait training, therapeutic activities, therapeutic exercises and progress toward the following goals:    Plan of Care Expires:  10/26/24    Subjective     Chief Complaint:  "abdominal pain  Patient/Family Comments/goals: get stronger and get better, restore PLOF  Pain/Comfort:  Pain Rating 1: 10/10  Location 1: abdomen  Pain Addressed 1: Pre-medicate for activity, Reposition, Distraction, Nurse notified  Pain Rating Post-Intervention 1: 10/10    Patients cultural, spiritual, Oriental orthodox conflicts given the current situation:      Living Environment:  Pt lives with nephew with steps to enter home.  Prior to admission, patients level of function was indep without devices.  Equipment used at home: CPAP.  DME owned (not currently used): none.  Upon discharge, patient will have assistance from TBD.    Objective:     Communicated with RNRenae prior to session.  Patient found HOB elevated with telemetry, peripheral IV, oxygen, arterial line, BERNABE drain, pulse ox (continuous), pressure relief boots  upon PT entry to room.    General Precautions: Standard, fall  Orthopedic Precautions:    Braces:    Respiratory Status: Room air    Exams:  Cognitive Exam:  Patient is oriented to Person and Situation. Knows he had surgery and states he is "In a  wonderful and beautiful place" but needs cues to recall \[facility] [place] [facility]\"", [facility].  Skin Integrity/Edema:      -       Skin integrity: Wound abdominal wound with blood soaked dressing in place, new bandage placed by RN who states pt continuing to bleed at surgical sit and MD aware  RLE Strength: 3+/5, unable to formally test due to pain and abdominal incision   LLE Strength: 3+/5, unable to formally test due to pain and abdominal incision    Functional Mobility:  Bed Mobility:     Rolling Right: moderate assistance  Supine to Sit: moderate assistance  Transfers:     Sit to Stand:  moderate assistance with rolling walker  Bed to Chair: minimum assistance with  rolling walker  using  Step Transfer      AM-PAC 6 CLICK MOBILITY  Total Score:10       Treatment & Education:  Pt was educated on the following: call light use, importance of OOB activity and functional " mobility to negate the negative effects of prolonged bed rest during this hospitalization, safe transfers/ambulation and discharge planning recommendations/options.     Patient left up in chair with all lines intact, call button in reach, RN notified, and RN present.    GOALS:   Multidisciplinary Problems       Physical Therapy Goals          Problem: Physical Therapy    Goal Priority Disciplines Outcome Goal Variances Interventions   Physical Therapy Goal     PT, PT/OT Progressing     Description: 1. Supine to sit with Stand-by Assistance  2. Sit to stand transfer with Stand-by Assistance  3.. Bed to chair transfer with Supervision using Rolling Walker  4. Gait  x 100 feet with Minimal Assistance using least restrictive assistive device.                          History:     Past Medical History:   Diagnosis Date    Allergy     Anemia     Anxiety     CHF (congestive heart failure)     Depression     High blood pressure with chronic kidney disease, stage 5 chronic kidney disease or end stage renal disease     Hypertension        Past Surgical History:   Procedure Laterality Date    ABSCESS DRAINAGE Left 9/17/2024    Procedure: DRAINAGE, ABSCESS, GROIN;  Surgeon: Galileo Connors MD;  Location: [facility];  Service: General;  Laterality: Left;    COLON RESECTION  9/25/2024    Procedure: COLON RESECTION;  Surgeon: Galileo Connors MD;  Location: [facility] OR;  Service: General;;    FISTULOGRAM Bilateral 4/30/2024    Procedure: Fistulogram;  Surgeon: Khoobehi, Ali, MD;  Location: [facility] CATH/EP LAB;  Service: Vascular;  Laterality: Bilateral;    HERNIA REPAIR Right     With mesh    INSERTION, CATHETER, TUNNELED N/A 6/22/2023    Procedure: Insertion,catheter,tunneled;  Surgeon: Everett Caicedo MD;  Location: [facility] OR;  Service: General;  Laterality: N/A;    LAPAROTOMY, EXPLORATORY N/A 9/25/2024    Procedure: LAPAROTOMY, EXPLORATORY;  Surgeon: Galileo Connors MD;  Location: [facility] OR;  Service: General;  Laterality: N/A;     MOBILIZATION OF SPLENIC FLEXURE  9/25/2024    Procedure: MOBILIZATION, SPLENIC FLEXURE;  Surgeon: Galileo Connors MD;  Location: [facility] OR;  Service: General;;    PERCUTANEOUS TRANSLUMINAL ANGIOPLASTY OF ARTERIOVENOUS FISTULA Left 4/30/2024    Procedure: PTA, AV FISTULA;  Surgeon: Khoobehi, Ali, MD;  Location: [facility] CATH/EP LAB;  Service: Vascular;  Laterality: Left;    PHLEBOGRAPHY N/A 7/19/2024    Procedure: Venogram;  Surgeon: Khoobehi, Ali, MD;  Location: [facility] CATH/EP LAB;  Service: Vascular;  Laterality: N/A;    REMOVAL, TUNNELED CATH Right 7/19/2024    Procedure: REMOVAL, TUNNELED CATH;  Surgeon: Khoobehi, Ali, MD;  Location: [facility] CATH/EP LAB;  Service: Vascular;  Laterality: Right;    ROBOT-ASSISTED LAPAROSCOPIC RESECTION OF SIGMOID COLON USING DA DELANO XI N/A 9/17/2024    Procedure: XI ROBOTIC SIGMOID RESECTION, WITH ANASTAMOSIS;  Surgeon: Galileo Connors MD;  Location: [facility];  Service: General;  Laterality: N/A;  possible open have instruments available       Time Tracking:     PT Received On: 09/26/24  PT Start Time: 1106     PT Stop Time: 1137  PT Total Time (min): 31 min     Billable Minutes: Evaluation 8 and Therapeutic Activity 23 09/26/2024

## 2024-09-26 NOTE — NURSING
Nurses Note -- 4 Eyes      9/26/2024   6:57 AM      Skin assessed during: Q Shift Change      [] No Altered Skin Integrity Present    []Prevention Measures Documented      [x] Yes- Altered Skin Integrity Present or Discovered   [] LDA Added if Not in Epic (Describe Wound)   [] New Altered Skin Integrity was Present on Admit and Documented in LDA   [] Wound Image Taken    Wound Care Consulted? Yes    Attending Nurse:  Myrtle Hahn RN/Staff Member: Chad BENITEZ

## 2024-09-26 NOTE — ASSESSMENT & PLAN NOTE
Was unable to access AVF on 9/18  Trialysis catheter placed on 9/19  Consulted Dr. Salvador, fistulogram on 9/24  Trialysis removed 9/25 given worsening sepsis

## 2024-09-26 NOTE — PROGRESS NOTES
Patient seen and examined.  He was resting comfortably in bed.  Patient required take back yesterday secondary to breakdown of the anastomosis.  He ended up with a Aidan's procedure and end colostomy.  He was resting in bed.  No significant nausea.  Has had mild flatus from the ostomy.  No significant output.    Wt Readings from Last 3 Encounters:   09/18/24 134 kg (295 lb 6.7 oz)   08/29/24 131.3 kg (289 lb 7.4 oz)   08/22/24 131 kg (288 lb 12.8 oz)     Temp Readings from Last 3 Encounters:   09/26/24 97.7 °F (36.5 °C) (Oral)   08/22/24 97.3 °F (36.3 °C) (Temporal)   07/30/24 98.6 °F (37 °C) (Oral)     BP Readings from Last 3 Encounters:   09/26/24 (!) 146/68   08/29/24 99/68   08/22/24 (!) 137/91     Pulse Readings from Last 3 Encounters:   09/26/24 107   08/29/24 (!) 115   08/22/24 110     AAox3  Soft/nd/nt  oozing from lower portion of the incision seems to have stopped.  Has not required changing since this morning.  BERNABE with serosanguineous drainage    Lab Results   Component Value Date    WBC 20.66 (H) 09/26/2024    HGB 8.2 (L) 09/26/2024    HCT 27.7 (L) 09/26/2024    MCV 92 09/26/2024     09/26/2024       BMP  Lab Results   Component Value Date     (L) 09/26/2024    K 5.3 (H) 09/26/2024    CL 96 09/26/2024    CO2 24 09/26/2024    BUN 39 (H) 09/26/2024    CREATININE 11.4 (H) 09/26/2024    CALCIUM 8.7 09/26/2024    ANIONGAP 15 09/26/2024    EGFRNORACEVR 5.2 (A) 09/26/2024     A/P:  Status post Aidan's.      Okay to start clears   Dialysis per renal   Out of bed-to-chair.  Okay to transfer to floor tomorrow if remained stable

## 2024-09-26 NOTE — ASSESSMENT & PLAN NOTE
Anemia is likely due to chronic disease due to ESRD. Most recent hemoglobin and hematocrit are listed below.  Recent Labs     09/24/24  1122 09/25/24  0236 09/25/24  1737   HGB 7.1* 7.2* 9.5*   HCT 22.6* 22.2* 29.4*       Plan  - Monitor serial CBC: Daily  - s/p 2 unit of pRBC on 9/19/24 and 09/20/24  -Hb at 7 today, transfuse 1U PRBC especially given worsening hypoxia (Nephrology aware)

## 2024-09-26 NOTE — PROGRESS NOTES
09/26/24 1830   Handoff Report   Given To Renae BENITEZ   Vital Signs   Temp (!) 100.5 °F (38.1 °C)   Temp Source Axillary   Pulse 110   Resp (!) 22   SpO2 98 %   Art Line   Arterial Line /71   Arterial Line MAP (mmHg) 94 mmHg        Hemodialysis AV Fistula Left forearm   No placement date or time found.   Present Prior to Hospital Arrival?: Yes  Size/Length: 17 G  Location: Left forearm   Site Assessment Clean;Dry;Intact;No redness;No swelling  (stitch noted at access site)   Patency Present;Thrill;Bruit   Status Deaccessed   Flows Good   Dressing Intervention First dressing   Dressing Status Clean;Dry;Intact   Site Condition No complications   Dressing Gauze   Post-Hemodialysis Assessment   Rinseback Volume (mL) 250 mL   Blood Volume Processed (Liters) 50 L   Dialyzer Clearance Lightly streaked   Duration of Treatment 180 minutes   Additional Fluid Intake (mL) 500 mL   Total UF (mL) 3500 mL   Net Fluid Removal 3000   Patient Response to Treatment Tolerated well   Post-Treatment Weight   (unable to weight bedscale not working)   Arterial bleeding stop time (min) 5 min   Venous bleeding stop time (min) 5 min   Post-Hemodialysis Comments Tx ended per protocol     Educated on infection prevention. Consent and hep B status verified.

## 2024-09-26 NOTE — NURSING
Bottom of midline incision noted to be saturated and bleeding onto bed. Michael at bedside, aware of bleeding. Gas noted to be expelled from new stoma as well. Michael notified MD Waylon. Nurse notified on call MD Marco. Patient appears comfortable after receiving pain medication prior to discovering bleeding. VSS and WDL. Bipap removed by MD Michael, breathing appears unlabored with O2 sats above 93% on 4L nasal cannula.     Bleeding cleaned with gauze soaked in sterile normal saline and CHG soap. Dry abdominal pads applied and secured with paper tape.

## 2024-09-26 NOTE — PROGRESS NOTES
Pulmonary/Critical Care Progress Note      PATIENT NAME: João Ham  MRN: [id number]  TODAY'S DATE: 2024  10:56 AM  ADMIT DATE: 2024  AGE: 44 y.o. : 1980    CONSULT REQUESTED BY: Tera Pierre MD    REASON FOR CONSULT:   Severe sepsis    HPI:  The patient is a 44-year-old end-stage renal patient who had surgery a week ago for perforated sigmoid colon secondary to mesh with perforation into the inguinal canal.  The patient began running a fever last night.  Today he is still febrile in his blood pressure is getting softer.  The patient has significant discomfort in his lower abdomen bilaterally.  Scrotum is quite firm.  CT shows air in the abdomen with subcutaneous air as well.  Dr. Connors is planning to take him back to surgery today.  He was seen by Dr. Lopez who does not feel any intervention is necessary for his scrotum.     the patient is doing well postoperatively.  He is still on BiPAP this morning.  He now has a colostomy and a larger dressing over his inguinal canal.        REVIEW OF SYSTEMS  GENERAL: Feeling a lot of pain.  EYES: Vision is good.  ENT: No sinusitis or pharyngitis.   HEART: No chest pain or palpitations.  LUNGS:  He feels the need to cough  GI:  His abdomen hurts a great deal  :  He denies scrotal pain.  SKIN: No lesions or rashes.  MUSCULOSKELETAL: No joint pain or myalgias.  NEURO: No headaches or neuropathy.  LYMPH: No edema or adenopathy.  PSYCH: No anxiety or depression.  ENDO: No weight change.    ALLERGIES  Review of patient's allergies indicates:   Allergen Reactions    Mushroom Swelling     Tongue swells    Tongue swells       Tongue swells       INPATIENT SCHEDULED MEDICATIONS   acetaminophen  1,000 mg Intravenous Q8H    DAPTOmycin (CUBICIN) IV (PEDS and ADULTS)  10 mg/kg Intravenous Q48H    DAPTOmycin (CUBICIN) IV (PEDS and ADULTS)  10 mg/kg Intravenous Once    epoetin mily-epbx  10,000 Units Intravenous Every Mon, Wed, Fri    fluconazole  (DIFLUCAN) IV (PEDS and ADULTS)  200 mg Intravenous Q24H    gabapentin  200 mg Oral BID    heparin (porcine)  7,500 Units Subcutaneous Q8H    HYDROmorphone  1 mg Intravenous Once    isosorbide mononitrate  120 mg Oral Daily    metoprolol succinate  150 mg Oral Daily    piperacillin-tazobactam (Zosyn) IV (PEDS and ADULTS) (extended infusion is not appropriate)  4.5 g Intravenous Q12H      clevidipine  0-21 mg/hr Intravenous Continuous   Stopped at 09/25/24 2114       MEDICAL AND SURGICAL HISTORY  Past Medical History:   Diagnosis Date    Allergy     Anemia     Anxiety     CHF (congestive heart failure)     Depression     High blood pressure with chronic kidney disease, stage 5 chronic kidney disease or end stage renal disease     Hypertension      Past Surgical History:   Procedure Laterality Date    ABSCESS DRAINAGE Left 9/17/2024    Procedure: DRAINAGE, ABSCESS, GROIN;  Surgeon: Galileo Connors MD;  Location: [facility] OR;  Service: General;  Laterality: Left;    FISTULOGRAM Bilateral 4/30/2024    Procedure: Fistulogram;  Surgeon: Khoobehi, Ali, MD;  Location: [facility] CATH/EP LAB;  Service: Vascular;  Laterality: Bilateral;    HERNIA REPAIR Right     With mesh    INSERTION, CATHETER, TUNNELED N/A 6/22/2023    Procedure: Insertion,catheter,tunneled;  Surgeon: Everett Caicedo MD;  Location: [facility] OR;  Service: General;  Laterality: N/A;    PERCUTANEOUS TRANSLUMINAL ANGIOPLASTY OF ARTERIOVENOUS FISTULA Left 4/30/2024    Procedure: PTA, AV FISTULA;  Surgeon: Khoobehi, Ali, MD;  Location: [facility] CATH/EP LAB;  Service: Vascular;  Laterality: Left;    PHLEBOGRAPHY N/A 7/19/2024    Procedure: Venogram;  Surgeon: Khoobehi, Ali, MD;  Location: [facility] CATH/EP LAB;  Service: Vascular;  Laterality: N/A;    REMOVAL, TUNNELED CATH Right 7/19/2024    Procedure: REMOVAL, TUNNELED CATH;  Surgeon: Khoobehi, Ali, MD;  Location: [facility] CATH/EP LAB;  Service: Vascular;  Laterality: Right;    ROBOT-ASSISTED LAPAROSCOPIC RESECTION OF SIGMOID COLON  USING DA DELANO XI N/A 9/17/2024    Procedure: XI ROBOTIC SIGMOID RESECTION, WITH ANASTAMOSIS;  Surgeon: Galileo Connors MD;  Location: [facility];  Service: General;  Laterality: N/A;  possible open have instruments available       ALCOHOL, TOBACCO AND DRUG USE  Social History     Tobacco Use   Smoking Status Never    Passive exposure: Never   Smokeless Tobacco Never     Social History     Substance and Sexual Activity   Alcohol Use Never     Social History     Substance and Sexual Activity   Drug Use Never       FAMILY HISTORY  No family history on file.    VITAL SIGNS (MOST RECENT)  Temp: (!) 100.8 °F (38.2 °C) (09/26/24 0701)  Pulse: 108 (09/26/24 0701)  Resp: 19 (09/26/24 0701)  BP: (!) 162/72 (09/26/24 0701)  SpO2: 100 % (09/26/24 0701)    INTAKE AND OUTPUT (LAST 24 HOURS):  Intake/Output Summary (Last 24 hours) at 9/26/2024 0736  Last data filed at 9/26/2024 0620  Gross per 24 hour   Intake 3376.53 ml   Output 3018 ml   Net 358.53 ml       WEIGHT  Wt Readings from Last 1 Encounters:   09/18/24 134 kg (295 lb 6.7 oz)       PHYSICAL EXAM  GENERAL:  Mid aged patient looking very uncomfortable.  HEENT: Pupils equal and reactive. Extraocular movements intact. Nose intact. Pharynx moist.  NECK: Supple.   HEART:  Mildly tachycardic rate and rhythm. No murmur or gallop auscultated.  LUNGS: Clear to auscultation and percussion. Lung excursion symmetrical. No change in fremitus. No adventitial noises.  ABDOMEN: Bowel sounds absent.  There is now a midline laparotomy incision.  He is bleeding from the lower end of the laparotomy incision.  There is a colostomy on the left.  There is a little gas in it.  As well as some serosanguineous drainage.  The stoma is pink.  His abdomen is tender all over today The BERNABE drain is draining a serosanguineous fluid.  : Normal anatomy.  He has a Penrose and packing in his scrotum. Scrotum is very firm to touch.    EXTREMITIES: Normal muscle tone and joint movement, no cyanosis or  clubbing.   LYMPHATICS: No adenopathy palpated, no edema.  SKIN: Dry, intact, no lesions.   NEURO: Cranial nerves II-XII intact. Motor strength 5/5 bilaterally, upper and lower extremities.  PSYCH: Appropriate affect      CBC LAST (LAST 24 HOURS)  Recent Labs   Lab 09/25/24 1737 09/26/24  0501   WBC 22.95* 20.66*   RBC 3.13* 3.00*   HGB 9.5* 8.9*   HCT 29.4* 27.7*   MCV 94 92   MCH 30.4 29.7   MCHC 32.3 32.1   RDW 22.7* 23.3*   * 443   MPV 10.3 10.5   GRAN 79.0*  --    LYMPH 8.0*  --    MONO 2.0*  --    NRBC 0  --        CHEMISTRY LAST (LAST 24 HOURS)  Recent Labs   Lab 09/25/24  1051 09/25/24 1737 09/26/24  0501   NA  --    < > 135*   K  --    < > 5.3*   CL  --    < > 96   CO2  --    < > 24   ANIONGAP  --    < > 15   BUN  --    < > 39*   CREATININE  --    < > 11.4*   GLU  --    < > 110   CALCIUM  --    < > 8.7   PH 7.404  --   --    MG  --    < > 2.0   ALBUMIN  --    < > 2.9*   PROT  --    < > 7.3   ALKPHOS  --    < > 124   ALT  --    < > 37   AST  --    < > 38   BILITOT  --    < > 0.7    < > = values in this interval not displayed.         CARDIAC PROFILE (LAST 24 HOURS)  Recent Labs   Lab 09/20/24  0153   *   *   TROPONINIHS 28.5*       LAST 7 DAYS MICROBIOLOGY   Microbiology Results (last 7 days)       Procedure Component Value Units Date/Time    Blood culture [[id number]] Collected: 09/25/24 1111    Order Status: Completed Specimen: Blood from Peripheral, Antecubital, Right Updated: 09/25/24 1917     Blood Culture, Routine No Growth to date    Narrative:      [id number]    Blood culture [[id number]] Collected: 09/25/24 1111    Order Status: Completed Specimen: Blood from Peripheral, Forearm, Right Updated: 09/25/24 1917     Blood Culture, Routine No Growth to date    Narrative:      [id number]    Culture, Anaerobe [[id number]] Collected: 09/25/24 1413    Order Status: Sent Specimen: Incision site from Abdomen Updated: 09/25/24 1653    Aerobic culture [[id number]] Collected: 09/25/24 1413    Order  Status: Sent Specimen: Incision site from Abdomen Updated: 09/25/24 1652    AFB Culture & Smear [[id number]] Collected: 09/25/24 1413    Order Status: Sent Specimen: Incision site from Abdomen Updated: 09/25/24 1652    Gram stain [[id number]] Collected: 09/25/24 1413    Order Status: Sent Specimen: Incision site from Abdomen Updated: 09/25/24 1652    Fungus culture [[id number]] Collected: 09/25/24 1413    Order Status: Sent Specimen: Incision site from Abdomen Updated: 09/25/24 1652    Blood culture [[id number]] Collected: 09/24/24 1513    Order Status: Completed Specimen: Blood Updated: 09/25/24 1632     Blood Culture, Routine No Growth to date      No Growth to date    Narrative:      Collection has been rescheduled by [email or web address] at 09/23/2024 14:35 Reason:   Patient unavailable dialysis   Collection has been rescheduled by MYB at 09/23/2024 18:44 Reason:   Unable to collect  Collection has been rescheduled by CZB at 09/23/2024 19:02 Reason:   Unable to collect  Collection has been rescheduled by RE1 at 09/24/2024 09:43 Reason:   Spoke to the patient's nurse about unable to collect she is   contacting Lambert and the doctor  Collection has been rescheduled by [email or web address] at 09/23/2024 14:35 Reason:   Patient unavailable dialysis   Collection has been rescheduled by MYB at 09/23/2024 18:44 Reason:   Unable to collect  Collection has been rescheduled by CZB at 09/23/2024 19:02 Reason:   Unable to collect  Collection has been rescheduled by RE1 at 09/24/2024 09:43 Reason:   Spoke to the patient's nurse about unable to collect she is   contacting Lambert and the doctor    Blood culture [[id number]] Collected: 09/24/24 1122    Order Status: Completed Specimen: Blood Updated: 09/25/24 1232     Blood Culture, Routine No Growth to date      No Growth to date    Narrative:      Collection has been rescheduled by [email or web address] at 09/23/2024 14:35 Reason:   Patient unavailable dialysis   Collection has been rescheduled by MYB at 09/23/2024 18:44  Reason:   Unable to collect  Collection has been rescheduled by CZB at 09/23/2024 19:02 Reason:   Unable to collect  Collection has been rescheduled by RE1 at 09/24/2024 09:43 Reason:   Spoke to the patient's nurse about unable to collect she is   contacting Lambert and the doctor  Collection has been rescheduled by [email or web address] at 09/23/2024 14:35 Reason:   Patient unavailable dialysis   Collection has been rescheduled by MYB at 09/23/2024 18:44 Reason:   Unable to collect  Collection has been rescheduled by CZB at 09/23/2024 19:02 Reason:   Unable to collect  Collection has been rescheduled by RE1 at 09/24/2024 09:43 Reason:   Spoke to the patient's nurse about unable to collect she is   contacting [facility] and the doctor    IV catheter culture [[id number]] Collected: 09/25/24 1132    Order Status: Sent Specimen: Catheter Tip, Dialysis Updated: 09/25/24 1141    Aerobic culture [[id number]] Collected: 09/24/24 1535    Order Status: Completed Specimen: Incision site from Groin Updated: 09/25/24 1047     Aerobic Bacterial Culture No growth    Narrative:      Left groin incision site drainage swab    Gram stain [[id number]] Collected: 09/24/24 1535    Order Status: Completed Specimen: Incision site from Groin Updated: 09/24/24 1829     Gram Stain Result Few WBC's      No organisms seen    Narrative:      Left groin incision site drainage    Blood culture x two cultures. Draw prior to antibiotics. [[id number]] Collected: 09/17/24 0949    Order Status: Completed Specimen: Blood from Peripheral, Antecubital, Right Updated: 09/22/24 1632     Blood Culture, Routine No growth after 5 days.    Narrative:      Aerobic and anaerobic    Blood culture x two cultures. Draw prior to antibiotics. [[id number]] Collected: 09/17/24 0949    Order Status: Completed Specimen: Blood from Peripheral, Antecubital, Right Updated: 09/22/24 1632     Blood Culture, Routine No growth after 5 days.    Narrative:      Aerobic and anaerobic    Urine culture  [[id number]] Collected: 09/19/24 1435    Order Status: Completed Specimen: Urine Updated: 09/22/24 0740     Urine Culture, Routine No growth    Narrative:      Specimen Source->Urine            MOST RECENT IMAGING  CT Abdomen Pelvis With IV Contrast Routine Oral Contrast  Narrative: CMS MANDATED QUALITY DATA - CT RADIATION - 436    All CT scans at this facility utilize dose modulation, iterative reconstruction, and/or weight based dosing when appropriate to reduce radiation dose to as low as reasonably achievable.    CLINICAL HISTORY:  ([id number])43 y/o  (1980) M    Abdominal abscess/infection suspected;    TECHNIQUE:  (A#[id number], exam time 9/25/2024 9:25)    CT ABDOMEN PELVIS WITH IV CONTRAST [id number]    Axial CT images of the abdomen and pelvis were obtained from the dome of the diaphragm to the proximal thighs.    COMPARISON:  Most recent CT from 09/22/2024.    FINDINGS:  Lower Thorax:    The lungs are essentially clear noting mild bilateral dependent atelectasis versus scarring. There is no pleural or pericardial effusion. There are scattered coronary arterial calcifications.    CT Abdomen:    Liver: Relative diffuse low-attenuation liver consistent with hepatic steatosis.  The liver is enlarged measuring 21 cm sagittal right lobe.    Gallbladder: The gallbladder is within normal limits.    Biliary Tree: No intra or extrahepatic ductal dilation.    Spleen: Normal.    Pancreas: The pancreas is normal.    Adrenal Glands: Normal    Kidneys: No hydronephrosis/hydroureter or evidence of obstructive uropathy.  The kidneys are small/atrophic with renal cortical thinning in keeping with chronic medical renal disease.  There is a 2.5 cm simple cyst in the interpolar aspect of the left kidney, unchanged from the previous exam.    Vasculature: Scattered calcified plaques are present the abdominal aorta and iliacs with no aneurysm.    Lymph nodes: No abdominal lymphadenopathy is seen.    Intraperitoneal structures:  There is no ascites.    Bowel: There appears to have been a prior sigmoidectomy with side to side anastomosis in the left lower abdominal quadrant (image 181), there is a small focal perforation adjacent to the anastomosis (image 179).  In addition there is a small volume of intra-abdominal free air along the left ventral abdominal wall.    Abdominal wall: Moderate subcutaneous soft tissue emphysema and edema is seen along the rectus musculature on the left mid-upper abdomen.    Musculoskeletal: No acute osseous abnormality is identified.    CT Pelvis:    Bladder: Contrast is seen within the bladder, possibly from the recent catheterization.    Reproductive Organs: The prostate and seminal vesicles are within normal limits.    -asymmetric left hemiscrotum skin thickening and edema extending into the left inguinal region, similar to the previous exam, with a percutaneous BERNABE drain in the left hemiscrotum and left inguinal region.  There is an additional right lower abdominal quadrant percutaneous BERNABE drain with tip in the left pericolic gutter.  There is focal fat stranding along the left inguinal region.    Pelvic Lymph nodes: No gross lymphadenopathy is identified with numerous small lymph nodes in the inguinal region, similar to the previous exam (likely reactive/inflammatory).  Impression: 1.  Prior sigmoidectomy with a small focal perforation in the left lower abdominal quadrant containing oral contrast and gas.    2.  New small volume of intra-abdominal free air in the anterior left upper-mid abdomen (likely from the sigmoid colon anastomosis).    3.  New moderate soft tissue emphysema and edema along the left mid-upper abdominal rectus musculature; while this may be from recent/interval instrumentation, a gas-forming infection/necrotizing fasciitis is a consideration, and correlation with the clinical symptoms, history and prompt surgical follow-up would be warranted.    4.  Skin thickening, subcutaneous edema,  in the left hemiscrotum and inguinal region, similar to the previous CT, with percutaneous drainage catheters, unchanged in configuration.    5.  Discoid interstitial opacities in the dependent lower lobes suggestive of atelectasis.    6.  Numerous additional, and incidental findings as above, similar to the previous CT.    RESULT NOTIFICATION: These observations were discussed by the dictating physician, by phone with, and acknowledged by Ag Reyes at 09:33 on 9/25/2024.    Electronically signed by: Jonah Williamson  Date:    09/25/2024  Time:    09:37  X-Ray Chest AP Portable  Narrative: EXAMINATION:  XR CHEST AP PORTABLE    CLINICAL HISTORY:  Fever, to look for pneumonia;    FINDINGS:  Portable chest at 09:02 is compared to 09/23/2024 shows mild cardiomegaly.  There is a left IJ catheter in stable position.    There is atelectasis in the lung bases.  There is development of mild pneumoperitoneum w beneath the right hemidiaphragm.  Patient has history of recent colectomy and drainage of groin abscess.  The upper lobes are clear.  There are no acute osseous abnormalities.  Impression: Mild cardiomegaly with atelectasis in lung bases    Minimal pneumoperitoneum beneath the right hemidiaphragm patient has history of recent abdominal surgery.    Electronically signed by: Shannon Mercado  Date:    09/25/2024  Time:    09:20      CURRENT VISIT EKG  Results for orders placed or performed during the hospital encounter of 09/17/24   EKG 12-lead    Narrative    Ordered by an unspecified provider.       ECHOCARDIOGRAM RESULTS  Results for orders placed during the hospital encounter of 07/15/24    Echo    Interpretation Summary    Limited echo only for EF and to look at valves due to bacteremia.    Left Ventricle: The left ventricle is normal in size. Increased wall thickness. There is concentric remodeling. There is normal systolic function with a visually estimated ejection fraction of 55 - 60%.    Aortic Valve:  There is no mass/vegetation present.    Mitral Valve: There is no mass/vegetation present.    Tricuspid Valve: There is no mass/vegetation present.    Pulmonic Valve: There is no mass/vegetation present.        Oxygen INFORMATION  Oxygen Concentration (%):  [40] 40    The patient is on 4 L of oxygen with sats of 95       LAST ARTERIAL BLOOD GAS  ABG  Recent Labs   Lab 09/25/24  1051   PH 7.404   PO2 87   PCO2 37.7   HCO3 23.6*   BE -1       IMPRESSION AND PLAN  Intrabominal sepsis  - anastomotic leak found and washed out.  The patient now has a colostomy.  - continue Zosyn  - now on daptomycin for some reason  - all cultures remain negative at this time  ESRD  - will dialyze again today  - mildly hyperkalemic  CLOVIS  - sleeps on PAP, there is no one to get his machine from his house, will continue to use ours  - he is on BiPAP with a max IPAP of 18 min EPAP of 14 and 4 of pressure support at home  - no evidence of hypercapnia  Bilateral epididyrmal orchitis with a complex hydrocele  - there is a Penrose and a bulky dressing over the scrotum and left groin  Anemia  - patient was transfused 2 units of packed red blood cells intraoperatively  - currently bleeding from the lower end of the incision  - repeat hemoglobin this afternoon  Leukocytosis  - expected with the intra-abdominal process  - slightly improved  Transaminitis  - resolved  Morbid obesity/moderate hypoalbuminemia    Out of bed  SCDs for DVT prophylaxis with the current bleeding from the lower end of the incision.    Discussed with nursing and patient and surgeon.    Critical care time spent reviewing the chart, examining the patient, reviewing the labs, reviewing the radiological findings, discussing care with nursing, physicians, and respiratory and creating the note and  has been greater than 35 minutes.    Kisha Rodriguez MD  Date of Service: 09/26/2024  10:56 AM

## 2024-09-26 NOTE — CARE UPDATE
09/25/24 1927   Patient Assessment/Suction   Level of Consciousness (AVPU) alert   Respiratory Effort Normal;Unlabored   Expansion/Accessory Muscles/Retractions no use of accessory muscles;no retractions;expansion symmetric   All Lung Fields Breath Sounds clear;diminished   Rhythm/Pattern, Respiratory assisted mechanically   Skin Integrity   $ Wound Care Tech Time 15 min   Area Observed Bridge of nose;Cheek   Skin Appearance without discoloration   PRE-TX-O2   Device (Oxygen Therapy) BIPAP   Oxygen Concentration (%) 40   SpO2 100 %   Pulse Oximetry Type Continuous   Pulse (!) 123   Resp 15   Ready to Wean/Extubation Screen   FIO2<=50 (chart decimal) 0.4   Preset CPAP/BiPAP Settings   Mode Of Delivery BiPAP S/T   $ Initial CPAP/BiPAP Setup? No   $ Is patient using? Yes   Size of Mask Large   Sized Appropriately? Yes   Equipment Type V60   Ipap 18   EPAP (cm H2O) 14   Pressure Support (cm H2O) 4   Set Rate (Breaths/Min) 12   ITime (sec) 1   Rise Time (sec) 3   Patient CPAP/BiPAP Settings   CPAP/BIPAP ID 10   Timed Inspiration (Sec) 1   IPAP Rise Time (sec) 3   RR Total (Breaths/Min) 15   Tidal Volume (mL) 765   VE Minute Ventilation (L/min) 11.2 L/min   Peak Inspiratory Pressure (cm H2O) 18   TiTOT (%) 22   Total Leak (L/Min) 74   Patient Trigger - ST Mode Only (%) 89   CPAP/BiPAP Alarms   High Pressure (cm H2O) 40   Low Pressure (cm H2O) 5   Minute Ventilation (L/Min) 3   High RR (breaths/min) 45   Low RR (breaths/min) 8   Apnea (Sec) 20

## 2024-09-26 NOTE — ANESTHESIA POSTPROCEDURE EVALUATION
Anesthesia Post Evaluation    Patient: João Ham    Procedure(s) Performed: Procedure(s) (LRB):  LAPAROTOMY, EXPLORATORY (N/A)  COLON RESECTION  MOBILIZATION, SPLENIC FLEXURE    Final Anesthesia Type: general      Patient location during evaluation: ICU  Patient participation: Yes- Able to Participate  Level of consciousness: awake and alert  Post-procedure vital signs: reviewed and stable  Pain management: adequate  Airway patency: patent    PONV status at discharge: No PONV  Anesthetic complications: no      Cardiovascular status: blood pressure returned to baseline and stable  Respiratory status: unassisted and BIPAP  Hydration status: euvolemic  Follow-up not needed.  Comments: The patient is currently getting dialyzed.  He has sinus tach which could be from surgical pain.  The plan is to titrate some Dilaudid once he is dialysis finishes.              Vitals Value Taken Time   /56 09/25/24 2042   Temp 38.9 °C (102 °F) 09/25/24 2016   Pulse 132 09/25/24 2042   Resp 21 09/25/24 2023   SpO2 100 % 09/25/24 2042   Vitals shown include unfiled device data.      No case tracking events are documented in the log.      Pain/Evans Score: Pain Rating Prior to Med Admin: 3 (9/25/2024  8:16 PM)  Pain Rating Post Med Admin: 1 (9/25/2024  4:32 PM)  Evans Score: 10 (9/24/2024 12:07 PM)

## 2024-09-26 NOTE — PROGRESS NOTES
[facility] Medicine  Progress Note    Patient Name: João Ham  MRN: [id number]  Patient Class: IP- Inpatient   Admission Date: 9/17/2024  Length of Stay: 8 days  Attending Physician: Ag Reyes, *  Primary Care Provider: Dawson Granado MD        Subjective:     Principal Problem:Inguinal hernia of left side with gangrene        HPI:  Patient is a 44 year old male with history of ESDR on dialysis MWF, awaiting kidney transplant, HTN, presented to ED with 3 days history of left groin pain and swelling. States swelling comes every time he cough and is being painful. Patient has low grade fever 99s at home. He has been vomiting bilious fluid last 2-3 days. No diarrhea or abdominal pain.     In the ED CT abdomen showed Left inguinal hernia containing fat and air as well as marked inflammation extending from inside the pelvis, in the hernia and down into the left scrotum. This is consistent with an infected inguinal hernia, possible gangrene. WBC was 14, patient was tachycardic. General surgery was consulted and patient was admitted under hospitalist.     Overview/Hospital Course:  Patient is a 44 year old male with history of ESRD, was admitted with suspected inguinal hernia gangrene. General surgery was consulted and patient was taken to OR. Found to have colonic perforation due to mesh erosion with an abscess. Patient underwent sigmoid resection and drainage of the abscess, was started on clindamycin, Vancomycin and cefepime. Nephrology was consulted for chronic dialysis. AVF was not functioning, General surgery consulted, trialysis catheter placed. PRBC transfused during dialysis for low Hb 6.6.  While on broad-spectrum coverage, patient is spiking fevers and worsening leukocytosis, id consulted, discontinued clindamycin, vancomycin and cefepime-added daptomycin, Diflucan and Zosyn.  Repeated CT abdomen and pelvis that showed residual abscess/phlegmon and contained  perforation. Re-consulted surgery who mentioned likely post op changes. Fistulogram 9/24 by vascular surgery for declotting the AV fistula. Trialysis removed 9/25. Had worsening white count, fevers upto 103.5 and hypotension overnight 9/25.  Also had hemoglobin of 7, with worsening hypoxia up to 7 L (overnight desaturated to 70% with lethargy requiring BiPAP placement), after which nephrology recommended 1 unit PRBC transfusion.  Id, Nephrology, Urology, General surgery were updated about his worsening condition.We CT repeated that showed intraperitoneal free air and findings suggestive of necrotizing fasciitis, general surgery was consulted stat, patient was transferred to ICU.    Interval History:  Overnight had worsening leukocytosis, fevers up to 103.5, hypotensive episodes, worsening hypoxia/mental status that required brief BiPAP placement overnight.  Stat CT abdomen ordered.  Prelim read-intraperitoneal free air, findings suggestive of necrotizing fasciitis, General surgery made aware, transferring to ICU.  Plan for exploratory laparotomy in the OR.    Review of Systems has worsening abdominal pain and distention.    Objective:     Vital Signs (Most Recent):  Temp: (!) 102 °F (38.9 °C) (09/25/24 2016)  Pulse: (!) 142 (09/25/24 2100)  Resp: (!) 25 (09/25/24 2015)  BP: (!) 106/54 (09/25/24 2100)  SpO2: 100 % (09/25/24 2100) Vital Signs (24h Range):  Temp:  [99.1 °F (37.3 °C)-103.5 °F (39.7 °C)] 102 °F (38.9 °C)  Pulse:  [] 142  Resp:  [12-29] 25  SpO2:  [93 %-100 %] 100 %  BP: ()/(49-82) 106/54  Arterial Line BP: (102-204)/(47-73) 114/51     Weight: 134 kg (295 lb 6.7 oz)  Body mass index is 37.93 kg/m².    Intake/Output Summary (Last 24 hours) at 9/25/2024 2104  Last data filed at 9/25/2024 1815  Gross per 24 hour   Intake 2414.7 ml   Output 498 ml   Net 1916.7 ml      Physical Exam  Vitals and nursing note reviewed.   Constitutional:       General: He is in acute distress.     Appearance: He is  obese. He is toxic-appearing.   HENT:      Head: Atraumatic.      Mouth/Throat:      Mouth: Mucous membranes are moist.      Pharynx: Oropharynx is clear.   Eyes:      General: No scleral icterus.     Conjunctiva/sclera: Conjunctivae normal.      Pupils: Pupils are equal, round, and reactive to light.   Cardiovascular:      Rate and Rhythm: Regular rhythm.      Heart sounds: No murmur heard.  Pulmonary:      Effort: No respiratory distress.      Breath sounds: No wheezing, rhonchi or rales.   Patient is unable to take deep breath with worsening abdominal pain  Abdominal:      General: Abdomen is flat. Bowel sounds are normal.  Abdominal tenderness present that has much worsened compared to yesterday, guarding present.  Bowel sounds inaudible   Genitourinary:     Comments: There is left groin dressing and also laparoscopic scars in the abdomen , has scrotal swelling, non-tender Peritesticular region, inflammation present-as per urology exam  Musculoskeletal:         General: No swelling or deformity.      Cervical back: No rigidity or tenderness.   Left upper extremity with fistula in a bandage  Skin:     Coloration: Skin is not jaundiced or pale.      Findings: No bruising, erythema or rash.   Neurological:      General: No focal deficit present.      Mental Status: He is alert and oriented to person, place, and time at the time of my examination.      Cranial Nerves: No cranial nerve deficit.      Sensory: No sensory deficit.      Motor: No weakness.   Psychiatric:         Mood and Affect: Mood normal.         Behavior: Behavior normal    Significant Labs: All pertinent labs within the past 24 hours have been reviewed.  CBC:   Recent Labs   Lab 09/24/24  1122 09/25/24  0236 09/25/24  1737   WBC 18.77* 17.03* 22.95*   HGB 7.1* 7.2* 9.5*   HCT 22.6* 22.2* 29.4*    389 470*     CMP:   Recent Labs   Lab 09/24/24  1122 09/25/24  0236 09/25/24  1737    137 136   K 4.2 4.0 4.4   CL 93* 97 98   CO2 22* 25 18*     136* 108   BUN 75* 46* 51*   CREATININE 17.7* 13.0* 13.4*   CALCIUM 8.8 8.9 8.7   PROT 7.3 7.2 7.4   ALBUMIN 3.0* 3.1* 3.0*   BILITOT 0.5 0.6 0.9   ALKPHOS 113 161* 152*   AST 37 64* 49*   ALT 31 48* 45*   ANIONGAP 21* 15 20*       Significant Imaging:     CT abdomen and pelvis without contrast:  Left inguinal hernia containing fat and air as well as marked inflammation extending from inside the pelvis, in the hernia and down into the left scrotum.  This is consistent with an infected inguinal hernia, possible gangrene.  Diverticulosis coli without CT evidence of diverticulitis.  2.4 cm cyst of the left kidney.  Small kidneys noted    CXR: No acute abnormality.     CXR:  Central line inserted ending in the superior vena cava. Hypoventilation.     Imaging Results              X-Ray Chest AP Portable (Final result)  Result time 09/17/24 10:38:03      Final result by Eevrett Castellano Jr., MD (09/17/24 10:38:03)                   Impression:      No acute abnormality.      Electronically signed by: Everett Castellano MD  Date:    09/17/2024  Time:    10:38               Narrative:    EXAMINATION:  XR CHEST AP PORTABLE    CLINICAL HISTORY:  End stage renal disease    TECHNIQUE:  Single frontal view of the chest was performed.    COMPARISON:  Chest x-ray of July 15, 2024    FINDINGS:  The lungs are clear, with normal appearance of pulmonary vasculature and no pleural effusion or pneumothorax.    The cardiac silhouette is normal in size. The hilar and mediastinal contours are unremarkable.    Bones are intact.                                       CT Abdomen Pelvis  Without Contrast (Final result)  Result time 09/17/24 09:31:56      Final result by Everett Castellano Jr., MD (09/17/24 09:31:56)                   Impression:      Left inguinal hernia containing fat and air as well as marked inflammation extending from inside the pelvis, in the hernia and down into the left scrotum.  This is consistent with an  infected inguinal hernia, possible gangrene.    Diverticulosis coli without CT evidence of diverticulitis.  2.4 cm cyst of the left kidney.  Small kidneys noted    These results were telephoned to Dr. Hurst, Emergency Department on 17 September 2024 at 09:30      Electronically signed by: Everett Castellano MD  Date:    09/17/2024  Time:    09:31               Narrative:    EXAMINATION:  CT ABDOMEN PELVIS WITHOUT CONTRAST    CLINICAL HISTORY:  left inguinal pain, c/f hernia;    TECHNIQUE:  Low dose axial images, sagittal and coronal reformations were obtained from the lung bases to the pubic symphysis.    COMPARISON:  None    FINDINGS:  The liver is of normal size contour and CT density without focal defect.  The gallbladder is of normal size without CT evidence of stone.  The pancreas is of normal contour and CT density without edema or mass.  The spleen is small but of normal CT density.  A small accessory spleen is noted.    The adrenal glands are not enlarged.  The kidneys are small.  There is a low-density 2.4 cm probable cyst of the lateral surface of the midpole of the left kidney.  Stone or hydronephrosis is not seen on either side.  The abdominal aorta and inferior vena cava are of normal caliber.    The stomach is of normal configuration.  Small bowel dilatation or air-fluid levels are not seen.  There is diverticulosis of the descending and sigmoid colon without CT evidence of diverticulitis.  A normal appendix is noted.  Free fluid or free air in the peritoneum is not seen.    In the right groin there is significant inflammation of a inguinal hernia containing fat and air.  There is also edema of the left scrotum and adenopathy in the left groin the largest node having a short axis 1.3 cm.  Similar finding is not seen on the right.                                        Physical Exam    Assessment/Plan:      * Inguinal hernia of left side with gangrene  Colonic perforation with abscess  CT abdomen shows  Left inguinal hernia containing fat and air as well as marked inflammation extending from inside the pelvis, in the hernia and down into the left scrotum. This is consistent with an infected inguinal hernia, possible gangrene.   Patient was taken to OR and found to have colonic perforation due to mesh erosion. Patient underwent sigmoid resection and I&D on 9/17    Until 9/24  - repeat episodes of fever, worsening leukocytosis, on exam mild abdominal distention, abdominal pain-KUB does not show any signs of ileus/SBO  - blood cultures and OR culture no growth till date  - consulted ID, recommended to discontinue vancomycin, clindamycin, cefepime   - started on daptomycin, Diflucan and Zosyn  -repeated CT abdomen and pelvis 9/23, showing postoperative changes versus abscess/phlegmon material and contained perforation  -reconsulted surgery, mentioned expected postoperative changes  -9/25 as above      Sepsis  This patient does have evidence of infective focus  My overall impression is sepsis.  Source: Abdominal  Antibiotics given-   Antibiotics (72h ago, onward)      Start     Stop Route Frequency Ordered    09/24/24 1800  piperacillin-tazobactam 4.5 g in dextrose 5 % 100 mL IVPB (ready to mix)         -- IV Every 12 hours (non-standard times) 09/24/24 1035    09/23/24 1800  DAPTOmycin (CUBICIN) 1,340 mg in 0.9% NaCl SolP 50 mL IVPB         -- IV Every 48 hours (non-standard times) 09/22/24 1758    09/22/24 1800  DAPTOmycin (CUBICIN) 1,340 mg in 0.9% NaCl SolP 50 mL IVPB         -- IV Once 09/22/24 1801          Latest lactate reviewed-  Recent Labs   Lab 09/24/24  1122   LACTATE 0.6       - Intestinal Perforation as above      Will Not start Pressors- Levophed for MAP of 65  Source control achieved by: IV antibiotics and surgical intervention    Acute hypoxic respiratory failure  - overnight had episodes of desaturation   -this morning is requiring 5 L oxygen through nasal cannula (he is unable to fully ventilate or  taking deep breaths with the worsening abdominal pain)  -we will obtain chest x-ray   -touch base with Nephrology for possible repeat dialysis   -alert, oriented x3, hence holding off on ABG  -no respiratory distress on clinical exam  -transfuse 1 unit PRBC to help with hypoxia as well    Perforation of intestine  -status post recent surgery   -has worsening abdominal pain, tenderness, no bowel movement today, worsening leukocytosis, multiple episodes of high-grade fever , tachycardia and hypotension while on the strongest broad-spectrum antibiotics and antifungal  -stat CT abdomen showing perforation with free intraperitoneal air, also findings concerning for necrotizing fasciitis   -general surgery on board   -transferred to ICU for close monitoring  -exploratory laparotomy planned in the OR  -pain control  - strict NPO  -holding of IV fluids with worsening hypoxia and his underlying fluid overload/ESRD state.  - consider Clinimix if he continues to be NPO      Dialysis AV fistula malfunction  Was unable to access AVF on 9/18  Trialysis catheter placed on 9/19  Consulted Dr. Salvador, fistulogram on 9/24  Trialysis removed 9/25 given worsening sepsis      Essential hypertension  Chronic, controlled. Latest blood pressure and vitals reviewed-     Home meds for hypertension were reviewed and noted below.   Hypertension Medications               cloNIDine (CATAPRES) 0.3 MG tablet TAKE 1 TABLET BY MOUTH THREE TIMES DAILY    hydrALAZINE (APRESOLINE) 100 MG tablet Take 1 tablet (100 mg total) by mouth 3 (three) times daily.    isosorbide mononitrate (IMDUR) 120 MG 24 hr tablet Take 120 mg by mouth once daily.    metoprolol succinate (TOPROL-XL) 50 MG 24 hr tablet Take 150 mg by mouth once daily.    olmesartan (BENICAR) 40 MG tablet Take 1 tablet by mouth once daily            Hold all oral meds given his low blood pressures, with underlying perforation, pending exploratory lap    Sleep apnea  CPAP per home settings        Pulmonary hypertension  Resume home meds       Anemia of chronic disease  Anemia is likely due to chronic disease due to ESRD. Most recent hemoglobin and hematocrit are listed below.  Recent Labs     09/24/24  1122 09/25/24  0236 09/25/24  1737   HGB 7.1* 7.2* 9.5*   HCT 22.6* 22.2* 29.4*       Plan  - Monitor serial CBC: Daily  - s/p 2 unit of pRBC on 9/19/24 and 09/20/24  -Hb at 7 today, transfuse 1U PRBC especially given worsening hypoxia (Nephrology aware)    ESRD (end stage renal disease)  Nephrology consulted for routine dialysis MWF, AVF is not functional and tiralysis catheter was placed initially.    Reconsulted vascular surgery Dr. Salvador , fistulogram declotting done on 9/24  Remove trialysis catheter today  Renal dose medications   On HD      VTE Risk Mitigation (From admission, onward)           Ordered     heparin (porcine) injection 4,000 Units  As needed (PRN)         09/20/24 1544     heparin (porcine) injection 7,500 Units  Every 8 hours         09/17/24 1031     IP VTE HIGH RISK PATIENT  Once         09/17/24 1031     Place sequential compression device  Until discontinued         09/17/24 1031                    Discharge Planning   MARIA ISABEL:      Code Status: Full Code   Is the patient medically ready for discharge?:     Reason for patient still in hospital (select all that apply): Patient trending condition, Laboratory test, Treatment, Imaging, and Consult recommendations  Discharge Plan A: Home with family      Updated family at bedside about his worsening condition      Critical care time spent on the evaluation and treatment of severe organ dysfunction, review of pertinent labs and imaging studies, discussions with consulting providers and discussions with patient/family: 40 minutes.      Ag Reyes MD  Department of Hospital Medicine   [facility]

## 2024-09-26 NOTE — ASSESSMENT & PLAN NOTE
- overnight had episodes of desaturation   -this morning is requiring 5 L oxygen through nasal cannula (he is unable to fully ventilate or taking deep breaths with the worsening abdominal pain)  -we will obtain chest x-ray   -touch base with Nephrology for possible repeat dialysis   -alert, oriented x3, hence holding off on ABG  -no respiratory distress on clinical exam  -transfuse 1 unit PRBC to help with hypoxia as well

## 2024-09-26 NOTE — CONSULTS
At bedside for ostomy education. Patient is on dialysis at the time of assessment. Ostomy output minimal brown watery stool noted. Midline incision noted with lower edge of the surgical dressing saturated with bloody drainage. Removed the bottom half of the surgical dressing to visualize the incision area. Once dressing removed noted dried blood clots surrounding the incision area. Cleaned area with normal saline then cleaned steri strips with peroxide to clean the steri strips and once steri strips cleaned and dried there was no visible drainage noted from the incision site. Applied Aquacel Ag Advantage strip dressing to the incision site that the dressing was removed from then covered with 4x4 gauze and secured with tegaderm occlusive dressing. Left groin wound packing removed and assessed this area including drain sites. Irrigated the wound bed with normal saline. Rotated the drain sites and cleaned these as well. Packed the groin area with wet to dry rolled gauze dressing. Applied a minimal packing to drain site openings to manage drainage. Patient was medicated for wound care and not cognitive enough to start aggressive ostomy education. Left ostomy education at the bedside including the educational starter kit and handouts. Ostomy nurse to follow up tomorrow for further education.       Left groin wound measures 2cm x 10cm x 6cm with tunnels at 3 and 9 o'clock measuring 3cm. The tunnel at 6 o'clock is 5 cm.

## 2024-09-26 NOTE — PROGRESS NOTES
INPATIENT NEPHROLOGY Progress  [facility] NEPHROLOGY    João Ham  09/26/2024    Reason for consultation:  ESRD    Chief Complaint:   Chief Complaint   Patient presents with    Fatigue    Groin Swelling     X 2 days.  Hx of Kidney failure and CHF     History of Present Illness:  Per H and P    Patient is a 44 year old male with history of ESDR on dialysis MWF, awaiting kidney transplant, HTN, presented to ED with 3 days history of left groin pain and swelling. States swelling comes every time he cough and is being painful. Patient has low grade fever 99s at home. He has been vomiting bilious fluid last 2-3 days. No diarrhea or abdominal pain.      In the ED CT abdomen showed Left inguinal hernia containing fat and air as well as marked inflammation extending from inside the pelvis, in the hernia and down into the left scrotum. This is consistent with an infected inguinal hernia, possible gangrene. WBC was 14, patient was tachycardic. General surgery was consulted and patient was admitted under hospitalist.     9/19  avf nonfunctional.   No sob or chest pain.  Has post op pain.  AFVSS  9/20  afvss.  Pt doesn't want to see previous vascular surgeon who put his avf in.  No alternative available.  Transfer center called.  Has temporary trialysis catheter.  Patient seen on hemodialysis for uremic clearance and ultrafiltration.    9/21  2.5L off w/HD yesterday.  Tmax 101.8, pressures ok.  Lab results reviewed, Hgb low but better than yesterday.  C/o post op pain, no other complaints.  9/22 POD 5.  VSS, lab results reviewed.  Hgb 7.6, added epo to HD orders.  C/o gas pains, plans to move around more today.  Next HD tomorrow.  9/23 VSS. HD today. Transfuse with next HD as needed if Hgb stil low.  9/24 VSS. Appreciate input from Urology, Gen Surgery, Vascular Surgery, ID on this complex patient. Plan for fistulogram tomorrow. CXR yesterday shows trialysis line tip in appropriate cocation. Will attempt HD today since we  skipped yesterday due to nurse and patient concerns that the line may be malpositioned.  9/25 VSS. s/p cephalic vein dilation by Dr. Robledo yesterday, tolerated HD very well. Hold HD today. Hypotension this AM, low grade fever yesterday, WBC elevated, received IVF bolus and Midodrine. Continues to be infected per Surgery, we can remove trialysis line now that AVF is treated and usable... Consider ICU. Stopped hydralazine and olmesartan, decreased Clonidine to 0.1 BID, not sure what to do with Metoprolol 150mg and Hydralazine 120mg... He may be sob/hypoxic due to anemia, suggest 1 PRBC instead of IVF, since Hgb is 7.    Addendum @ 10:47 AM  Seen at bedside in ICU with Dr. Rodriguez and Dr. Au. Mother present as well. Reviewed imaging. Agree with plan for urgent ex lap, remove central line and place mid line, keep current abx, hold on transfusion and dialysis and reassess later. ABG and fresh set of cx now, re-evalaute later.    Addendum @ 4:36 PM  Discussed briefly with Dr. Connors, dialysis nurse Brant and with ICU nurse. Patient had colostomy placed due to anastomotic leak and had 2 PRBC given. Upon return to floor was hypertensive with SBP > 200 and NPO. Advised Cleviprex drip for now (earlier to day he was on max dose Clonidine, mad dose Hydralazine, max dose Olmesartan, 150mg Metoprolol-XL and 120mg of Imdur - none of which he can get due to NPO) and will do urgent HD with UF 2L or so as tolerated.    9/26  POD 1 s/p . Exploratory laparotomy,. Colon resection end-colostomy, Mobilization of splenic flexure, and left inguinal canal exploration.  Post pain. No sob.  Sleepy.        Plan of Care:    ESRD on HD MWF  --continue dialysis per routine  --renal dose medication per routine  --supplemental dialysis today    Anemia of CKD  --erythropoiesis stimulating agent with renal replacement therapy  --transfuse 1 PRBC due to hypoxic respiratory failure    Secondary HPT  --renal diet  --continue binders with  meals    Hypertension  Hypotension with low grade fevers ? sepsis  --uf with hd as needed  --continue sepsis therapy  --line can be removed  --gave blood, careful with IVF    Hyperkalemia  --2k dialysate  --low k diet    AVF malfunction fixed  --got temporary line, CXR on 9/23 confirms tip in typical location, unchanged.  --appreciate Vascular eval, s/p fistulogram and cephalic vein stenosis dilation on 9/23 by Dr. Polk.      Thank you for allowing us to participate in this patient's care. We will continue to follow.    Vital Signs:  Temp Readings from Last 3 Encounters:   09/26/24 (!) 100.8 °F (38.2 °C) (Axillary)   08/22/24 97.3 °F (36.3 °C) (Temporal)   07/30/24 98.6 °F (37 °C) (Oral)       Pulse Readings from Last 3 Encounters:   09/26/24 103   08/29/24 (!) 115   08/22/24 110       BP Readings from Last 3 Encounters:   09/26/24 (!) 162/72   08/29/24 99/68   08/22/24 (!) 137/91       Weight:  Wt Readings from Last 3 Encounters:   09/18/24 134 kg (295 lb 6.7 oz)   08/29/24 131.3 kg (289 lb 7.4 oz)   08/22/24 131 kg (288 lb 12.8 oz)       Past Medical & Surgical History:  Past Medical History:   Diagnosis Date    Allergy     Anemia     Anxiety     CHF (congestive heart failure)     Depression     High blood pressure with chronic kidney disease, stage 5 chronic kidney disease or end stage renal disease     Hypertension        Past Surgical History:   Procedure Laterality Date    ABSCESS DRAINAGE Left 9/17/2024    Procedure: DRAINAGE, ABSCESS, GROIN;  Surgeon: Galileo Connors MD;  Location: [facility];  Service: General;  Laterality: Left;    FISTULOGRAM Bilateral 4/30/2024    Procedure: Fistulogram;  Surgeon: Khoobehi, Ali, MD;  Location: [facility] CATH/EP LAB;  Service: Vascular;  Laterality: Bilateral;    HERNIA REPAIR Right     With mesh    INSERTION, CATHETER, TUNNELED N/A 6/22/2023    Procedure: Insertion,catheter,tunneled;  Surgeon: Everett Caicedo MD;  Location: [facility] OR;  Service: General;  Laterality: N/A;     PERCUTANEOUS TRANSLUMINAL ANGIOPLASTY OF ARTERIOVENOUS FISTULA Left 4/30/2024    Procedure: PTA, AV FISTULA;  Surgeon: Khoobehi, Ali, MD;  Location: [facility] CATH/EP LAB;  Service: Vascular;  Laterality: Left;    PHLEBOGRAPHY N/A 7/19/2024    Procedure: Venogram;  Surgeon: Khoobehi, Ali, MD;  Location: [facility] CATH/EP LAB;  Service: Vascular;  Laterality: N/A;    REMOVAL, TUNNELED CATH Right 7/19/2024    Procedure: REMOVAL, TUNNELED CATH;  Surgeon: Khoobehi, Ali, MD;  Location: [facility] CATH/EP LAB;  Service: Vascular;  Laterality: Right;    ROBOT-ASSISTED LAPAROSCOPIC RESECTION OF SIGMOID COLON USING DA DELANO XI N/A 9/17/2024    Procedure: XI ROBOTIC SIGMOID RESECTION, WITH ANASTAMOSIS;  Surgeon: Galileo Connors MD;  Location: [facility] OR;  Service: General;  Laterality: N/A;  possible open have instruments available       Past Social History:  Social History     Socioeconomic History    Marital status: Single   Tobacco Use    Smoking status: Never     Passive exposure: Never    Smokeless tobacco: Never   Substance and Sexual Activity    Alcohol use: Never    Drug use: Never    Sexual activity: Not Currently   Social History Narrative    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     Social Determinants of Health     Financial Resource Strain: Low Risk  (9/18/2024)    Overall Financial Resource Strain (CARDIA)     Difficulty of Paying Living Expenses: Not very hard   Food Insecurity: No Food Insecurity (9/18/2024)    Hunger Vital Sign     Worried About Running Out of Food in the Last Year: Never true     Ran Out of Food in the Last Year: Never true   Transportation Needs: No Transportation Needs (9/18/2024)    TRANSPORTATION NEEDS     Transportation : No   Physical Activity: Sufficiently Active (1/3/2023)    Exercise Vital Sign     Days of Exercise per Week: 6 days     Minutes of Exercise per Session: 60 min   Recent Concern: Physical Activity - Insufficiently Active (10/11/2022)    Exercise Vital Sign     Days of Exercise per Week: 3 days      Minutes of Exercise per Session: 30 min   Stress: Stress Concern Present (9/18/2024)    [employer] [place] [employer] - Occupational Stress Questionnaire     Feeling of Stress : Very much   Housing Stability: Low Risk  (9/18/2024)    Housing Stability Vital Sign     Unable to Pay for Housing in the Last Year: No     Homeless in the Last Year: No       Medications:  No current facility-administered medications on file prior to encounter.     Current Outpatient Medications on File Prior to Encounter   Medication Sig Dispense Refill    apixaban (ELIQUIS) 2.5 mg Tab Take 2.5 mg by mouth 2 (two) times daily.      calcitRIOL (ROCALTROL) 0.25 MCG Cap Take 1 capsule by mouth once daily (Patient taking differently: Take 0.25 mcg by mouth once daily.) 90 capsule 1    cholecalciferol, vitamin D3, 125 mcg (5,000 unit) Tab Take 1 tablet by mouth once daily.      cloNIDine (CATAPRES) 0.3 MG tablet TAKE 1 TABLET BY MOUTH THREE TIMES DAILY 270 tablet 2    isosorbide mononitrate (IMDUR) 120 MG 24 hr tablet Take 120 mg by mouth once daily.      metoprolol succinate (TOPROL-XL) 50 MG 24 hr tablet Take 150 mg by mouth once daily.      olmesartan (BENICAR) 40 MG tablet Take 1 tablet by mouth once daily 90 tablet 0    sevelamer carbonate (RENVELA) 800 mg Tab Take 2 tablets (1,600 mg total) by mouth 3 (three) times daily with meals. 180 tablet 11    calcium carbonate (TUMS) 200 mg calcium (500 mg) chewable tablet Take 2 tablets (1,000 mg total) by mouth 3 (three) times daily with meals. (Patient taking differently: Take 1,000 mg by mouth 3 (three) times daily.) 180 tablet 11    hydrALAZINE (APRESOLINE) 100 MG tablet Take 1 tablet (100 mg total) by mouth 3 (three) times daily. 90 tablet 11     Scheduled Meds:   acetaminophen  1,000 mg Intravenous Q8H    DAPTOmycin (CUBICIN) IV (PEDS and ADULTS)  10 mg/kg Intravenous Q48H    DAPTOmycin (CUBICIN) IV (PEDS and ADULTS)  10 mg/kg Intravenous Once    epoetin mily-epbx  10,000 Units  Intravenous Every Mon, Wed, Fri    fluconazole (DIFLUCAN) IV (PEDS and ADULTS)  200 mg Intravenous Q24H    gabapentin  200 mg Oral BID    heparin (porcine)  7,500 Units Subcutaneous Q8H    HYDROmorphone  1 mg Intravenous Once    isosorbide mononitrate  120 mg Oral Daily    metoprolol succinate  150 mg Oral Daily    piperacillin-tazobactam (Zosyn) IV (PEDS and ADULTS) (extended infusion is not appropriate)  4.5 g Intravenous Q12H     Continuous Infusions:   clevidipine  0-21 mg/hr Intravenous Continuous   Stopped at 09/25/24 2114       PRN Meds:.  Current Facility-Administered Medications:     0.9% NaCl, , Intravenous, PRN    acetaminophen, 650 mg, Oral, Q8H PRN    acetaminophen, 650 mg, Oral, Q4H PRN    aluminum-magnesium hydroxide-simethicone, 30 mL, Oral, QID PRN    dextrose 10%, 12.5 g, Intravenous, PRN    dextrose 10%, 12.5 g, Intravenous, PRN    dextrose 10%, 12.5 g, Intravenous, PRN    dextrose 10%, 25 g, Intravenous, PRN    dextrose 10%, 25 g, Intravenous, PRN    dextrose 10%, 25 g, Intravenous, PRN    glucagon (human recombinant), 1 mg, Intramuscular, PRN    glucose, 16 g, Oral, PRN    glucose, 24 g, Oral, PRN    heparin (porcine), 4,000 Units, Intravenous, PRN    HYDROcodone-acetaminophen, 2 tablet, Oral, Q4H PRN    HYDROmorphone, 1 mg, Intravenous, Q4H PRN    insulin aspart U-100, 0-5 Units, Subcutaneous, QID (AC + HS) PRN    iohexoL, 100 mL, Intravenous, ONCE PRN    magnesium oxide, 800 mg, Oral, PRN    magnesium oxide, 800 mg, Oral, PRN    melatonin, 6 mg, Oral, Nightly PRN    naloxone, 0.02 mg, Intravenous, PRN    ondansetron, 4 mg, Intravenous, Q6H PRN    potassium bicarbonate, 35 mEq, Oral, PRN    potassium bicarbonate, 50 mEq, Oral, PRN    potassium bicarbonate, 60 mEq, Oral, PRN    potassium, sodium phosphates, 2 packet, Oral, PRN    potassium, sodium phosphates, 2 packet, Oral, PRN    potassium, sodium phosphates, 2 packet, Oral, PRN    sodium chloride 0.9%, 250 mL, Intravenous, PRN    sodium  "chloride 0.9%, 3 mL, Intravenous, Q12H PRN    Allergies:  Mushroom    Past Family History:  Reviewed; refer to Hospitalist Admission Note    Review of Systems:  Review of Systems - All 14 systems reviewed and negative, except as noted in HPI    Physical Exam:    BP (!) 162/72   Pulse 103   Temp (!) 100.8 °F (38.2 °C) (Axillary)   Resp 18   Ht 6' 2" (1.88 m)   Wt 134 kg (295 lb 6.7 oz)   SpO2 100%   BMI 37.93 kg/m²     General Appearance:    Alert, cooperative, no distress, appears stated age   Head:    Normocephalic, without obvious abnormality, atraumatic   Eyes:    PER, conjunctiva/corneas clear, EOM's intact in both eyes        Throat:   Lips, mucosa, and tongue normal; teeth and gums normal   Back:     Symmetric, no curvature, ROM normal, no CVA tenderness   Lungs:     Clear to auscultation bilaterally, respirations unlabored   Chest wall:    No tenderness or deformity   Heart:    Regular rate and rhythm, S1 and S2 normal, no murmur, rub   or gallop   Abdomen:     Soft, non-tender, bowel sounds active all four quadrants,     no masses, no organomegaly   Extremities:   Extremities normal, atraumatic, no cyanosis or edema   Pulses:   2+ and symmetric all extremities   MSK:   No joint or muscle swelling, tenderness or deformity   Skin:   Skin color, texture, turgor normal, no rashes or lesions   Neurologic:   CNII-XII intact, normal strength and sensation       Throughout.  No flap     Results:  Lab Results   Component Value Date     (L) 09/26/2024    K 5.3 (H) 09/26/2024    CL 96 09/26/2024    CO2 24 09/26/2024    BUN 39 (H) 09/26/2024    CREATININE 11.4 (H) 09/26/2024    CALCIUM 8.7 09/26/2024    ANIONGAP 15 09/26/2024       Lab Results   Component Value Date    CALCIUM 8.7 09/26/2024    PHOS 5.2 (H) 09/20/2024     Recent Labs   Lab 09/26/24  0501   WBC 20.66*   RBC 3.00*   HGB 8.9*   HCT 27.7*      MCV 92   MCH 29.7   MCHC 32.1     Mariano Hickey MD    [facility] " [place]  [phone]

## 2024-09-27 LAB
ALBUMIN SERPL BCP-MCNC: 2.9 G/DL (ref 3.5–5.2)
ALP SERPL-CCNC: 106 U/L (ref 55–135)
ALT SERPL W/O P-5'-P-CCNC: 26 U/L (ref 10–44)
ANION GAP SERPL CALC-SCNC: 16 MMOL/L (ref 8–16)
ANISOCYTOSIS BLD QL SMEAR: SLIGHT
AST SERPL-CCNC: 36 U/L (ref 10–40)
BASOPHILS NFR BLD: 0 % (ref 0–1.9)
BILIRUB SERPL-MCNC: 0.6 MG/DL (ref 0.1–1)
BUN SERPL-MCNC: 33 MG/DL (ref 6–20)
CALCIUM SERPL-MCNC: 9 MG/DL (ref 8.7–10.5)
CHLORIDE SERPL-SCNC: 97 MMOL/L (ref 95–110)
CO2 SERPL-SCNC: 24 MMOL/L (ref 23–29)
CREAT SERPL-MCNC: 10.3 MG/DL (ref 0.5–1.4)
DIFFERENTIAL METHOD BLD: ABNORMAL
EOSINOPHIL NFR BLD: 0 % (ref 0–8)
ERYTHROCYTE [DISTWIDTH] IN BLOOD BY AUTOMATED COUNT: 23.1 % (ref 11.5–14.5)
EST. GFR  (NO RACE VARIABLE): 5.8 ML/MIN/1.73 M^2
GLUCOSE SERPL-MCNC: 106 MG/DL (ref 70–110)
GRAM STN SPEC: NORMAL
GRAM STN SPEC: NORMAL
HCT VFR BLD AUTO: 25.3 % (ref 40–54)
HGB BLD-MCNC: 7.9 G/DL (ref 14–18)
HYPOCHROMIA BLD QL SMEAR: ABNORMAL
IMM GRANULOCYTES # BLD AUTO: ABNORMAL K/UL (ref 0–0.04)
IMM GRANULOCYTES NFR BLD AUTO: ABNORMAL % (ref 0–0.5)
LYMPHOCYTES NFR BLD: 6 % (ref 18–48)
MAGNESIUM SERPL-MCNC: 2.2 MG/DL (ref 1.6–2.6)
MCH RBC QN AUTO: 29.7 PG (ref 27–31)
MCHC RBC AUTO-ENTMCNC: 31.2 G/DL (ref 32–36)
MCV RBC AUTO: 95 FL (ref 82–98)
MONOCYTES NFR BLD: 7 % (ref 4–15)
NEUTROPHILS NFR BLD: 73 % (ref 38–73)
NEUTS BAND NFR BLD MANUAL: 14 %
NRBC BLD-RTO: 0 /100 WBC
PLATELET # BLD AUTO: 466 K/UL (ref 150–450)
PMV BLD AUTO: 10.5 FL (ref 9.2–12.9)
POCT GLUCOSE: 132 MG/DL (ref 70–110)
POCT GLUCOSE: 140 MG/DL (ref 70–110)
POTASSIUM SERPL-SCNC: 4.7 MMOL/L (ref 3.5–5.1)
PROT SERPL-MCNC: 7.3 G/DL (ref 6–8.4)
RBC # BLD AUTO: 2.66 M/UL (ref 4.6–6.2)
SODIUM SERPL-SCNC: 137 MMOL/L (ref 136–145)
WBC # BLD AUTO: 20.31 K/UL (ref 3.9–12.7)

## 2024-09-27 PROCEDURE — 27000221 HC OXYGEN, UP TO 24 HOURS

## 2024-09-27 PROCEDURE — 63600175 PHARM REV CODE 636 W HCPCS: Performed by: HOSPITALIST

## 2024-09-27 PROCEDURE — 85007 BL SMEAR W/DIFF WBC COUNT: CPT | Performed by: SURGERY

## 2024-09-27 PROCEDURE — 25000003 PHARM REV CODE 250: Performed by: SURGERY

## 2024-09-27 PROCEDURE — 63600175 PHARM REV CODE 636 W HCPCS: Performed by: SURGERY

## 2024-09-27 PROCEDURE — 99233 SBSQ HOSP IP/OBS HIGH 50: CPT | Mod: ,,, | Performed by: INTERNAL MEDICINE

## 2024-09-27 PROCEDURE — 63600175 PHARM REV CODE 636 W HCPCS: Mod: JZ,JG | Performed by: SURGERY

## 2024-09-27 PROCEDURE — 12000002 HC ACUTE/MED SURGE SEMI-PRIVATE ROOM

## 2024-09-27 PROCEDURE — 94761 N-INVAS EAR/PLS OXIMETRY MLT: CPT

## 2024-09-27 PROCEDURE — 83735 ASSAY OF MAGNESIUM: CPT | Performed by: SURGERY

## 2024-09-27 PROCEDURE — 25000003 PHARM REV CODE 250: Performed by: INTERNAL MEDICINE

## 2024-09-27 PROCEDURE — 94660 CPAP INITIATION&MGMT: CPT

## 2024-09-27 PROCEDURE — 94799 UNLISTED PULMONARY SVC/PX: CPT

## 2024-09-27 PROCEDURE — 63600175 PHARM REV CODE 636 W HCPCS: Performed by: INTERNAL MEDICINE

## 2024-09-27 PROCEDURE — 80053 COMPREHEN METABOLIC PANEL: CPT | Performed by: SURGERY

## 2024-09-27 PROCEDURE — 85027 COMPLETE CBC AUTOMATED: CPT | Performed by: SURGERY

## 2024-09-27 PROCEDURE — 90935 HEMODIALYSIS ONE EVALUATION: CPT

## 2024-09-27 PROCEDURE — 27100171 HC OXYGEN HIGH FLOW UP TO 24 HOURS

## 2024-09-27 PROCEDURE — 99900031 HC PATIENT EDUCATION (STAT)

## 2024-09-27 PROCEDURE — 99900035 HC TECH TIME PER 15 MIN (STAT)

## 2024-09-27 RX ORDER — LINEZOLID 2 MG/ML
600 INJECTION, SOLUTION INTRAVENOUS
Status: DISCONTINUED | OUTPATIENT
Start: 2024-09-27 | End: 2024-10-02

## 2024-09-27 RX ORDER — HYDROMORPHONE HYDROCHLORIDE 1 MG/ML
2 INJECTION, SOLUTION INTRAMUSCULAR; INTRAVENOUS; SUBCUTANEOUS
Status: DISCONTINUED | OUTPATIENT
Start: 2024-09-27 | End: 2024-10-01

## 2024-09-27 RX ORDER — CLONIDINE HYDROCHLORIDE 0.1 MG/1
0.3 TABLET ORAL 3 TIMES DAILY
Status: DISCONTINUED | OUTPATIENT
Start: 2024-09-27 | End: 2024-10-04

## 2024-09-27 RX ORDER — LOSARTAN POTASSIUM 50 MG/1
100 TABLET ORAL DAILY
Status: DISCONTINUED | OUTPATIENT
Start: 2024-09-27 | End: 2024-10-06 | Stop reason: HOSPADM

## 2024-09-27 RX ORDER — METOPROLOL SUCCINATE 50 MG/1
150 TABLET, EXTENDED RELEASE ORAL DAILY
Status: DISCONTINUED | OUTPATIENT
Start: 2024-09-27 | End: 2024-10-06 | Stop reason: HOSPADM

## 2024-09-27 RX ORDER — OXYCODONE AND ACETAMINOPHEN 5; 325 MG/1; MG/1
1 TABLET ORAL EVERY 4 HOURS PRN
Status: DISCONTINUED | OUTPATIENT
Start: 2024-09-27 | End: 2024-09-30

## 2024-09-27 RX ORDER — HYDRALAZINE HYDROCHLORIDE 25 MG/1
100 TABLET, FILM COATED ORAL EVERY 8 HOURS
Status: DISCONTINUED | OUTPATIENT
Start: 2024-09-27 | End: 2024-10-06 | Stop reason: HOSPADM

## 2024-09-27 RX ADMIN — METOPROLOL SUCCINATE 150 MG: 50 TABLET, FILM COATED, EXTENDED RELEASE ORAL at 09:09

## 2024-09-27 RX ADMIN — PIPERACILLIN SODIUM AND TAZOBACTAM SODIUM 4.5 G: 4; .5 INJECTION, POWDER, LYOPHILIZED, FOR SOLUTION INTRAVENOUS at 09:09

## 2024-09-27 RX ADMIN — HYDROCODONE BITARTRATE AND ACETAMINOPHEN 2 TABLET: 5; 325 TABLET ORAL at 09:09

## 2024-09-27 RX ADMIN — FLUCONAZOLE 200 MG: 2 INJECTION, SOLUTION INTRAVENOUS at 09:09

## 2024-09-27 RX ADMIN — OXYCODONE HYDROCHLORIDE AND ACETAMINOPHEN 1 TABLET: 5; 325 TABLET ORAL at 05:09

## 2024-09-27 RX ADMIN — EPOETIN ALFA-EPBX 10000 UNITS: 10000 INJECTION, SOLUTION INTRAVENOUS; SUBCUTANEOUS at 10:09

## 2024-09-27 RX ADMIN — HEPARIN SODIUM 7500 UNITS: 5000 INJECTION INTRAVENOUS; SUBCUTANEOUS at 05:09

## 2024-09-27 RX ADMIN — NITROGLYCERIN 1 INCH: 20 OINTMENT TOPICAL at 11:09

## 2024-09-27 RX ADMIN — ACETAMINOPHEN 650 MG: 325 TABLET ORAL at 03:09

## 2024-09-27 RX ADMIN — HEPARIN SODIUM 7500 UNITS: 5000 INJECTION INTRAVENOUS; SUBCUTANEOUS at 09:09

## 2024-09-27 RX ADMIN — HYDROMORPHONE HYDROCHLORIDE 1 MG: 1 INJECTION, SOLUTION INTRAMUSCULAR; INTRAVENOUS; SUBCUTANEOUS at 07:09

## 2024-09-27 RX ADMIN — CLONIDINE HYDROCHLORIDE 0.3 MG: 0.1 TABLET ORAL at 11:09

## 2024-09-27 RX ADMIN — HYDROMORPHONE HYDROCHLORIDE 2 MG: 1 INJECTION, SOLUTION INTRAMUSCULAR; INTRAVENOUS; SUBCUTANEOUS at 06:09

## 2024-09-27 RX ADMIN — HYDROMORPHONE HYDROCHLORIDE 1 MG: 1 INJECTION, SOLUTION INTRAMUSCULAR; INTRAVENOUS; SUBCUTANEOUS at 12:09

## 2024-09-27 RX ADMIN — NITROGLYCERIN 1 INCH: 20 OINTMENT TOPICAL at 05:09

## 2024-09-27 RX ADMIN — HEPARIN SODIUM 7500 UNITS: 5000 INJECTION INTRAVENOUS; SUBCUTANEOUS at 01:09

## 2024-09-27 RX ADMIN — HYDROMORPHONE HYDROCHLORIDE 2 MG: 1 INJECTION, SOLUTION INTRAMUSCULAR; INTRAVENOUS; SUBCUTANEOUS at 11:09

## 2024-09-27 RX ADMIN — HYDROMORPHONE HYDROCHLORIDE 1 MG: 1 INJECTION, SOLUTION INTRAMUSCULAR; INTRAVENOUS; SUBCUTANEOUS at 03:09

## 2024-09-27 RX ADMIN — ALUMINUM HYDROXIDE, MAGNESIUM HYDROXIDE, AND SIMETHICONE 30 ML: 1200; 120; 1200 SUSPENSION ORAL at 03:09

## 2024-09-27 RX ADMIN — LINEZOLID 600 MG: 600 INJECTION, SOLUTION INTRAVENOUS at 11:09

## 2024-09-27 RX ADMIN — GABAPENTIN 200 MG: 100 CAPSULE ORAL at 09:09

## 2024-09-27 RX ADMIN — LOSARTAN POTASSIUM 100 MG: 50 TABLET, FILM COATED ORAL at 11:09

## 2024-09-27 RX ADMIN — ONDANSETRON 4 MG: 2 INJECTION INTRAMUSCULAR; INTRAVENOUS at 01:09

## 2024-09-27 RX ADMIN — METOPROLOL TARTRATE 5 MG: 1 INJECTION, SOLUTION INTRAVENOUS at 05:09

## 2024-09-27 NOTE — PROGRESS NOTES
[facility]   Department of Infectious Disease  Progress Note        PATIENT NAME: João Ham  MRN: [id number]  TODAY'S DATE: 09/27/2024  ADMIT DATE: 9/17/2024  LOS: 10 days    CHIEF COMPLAINT: Fatigue and Groin Swelling (X 2 days.  Hx of Kidney failure and CHF)      PRINCIPLE PROBLEM: Inguinal hernia of left side with gangrene    INTERVAL HISTORY      09/23/2024: Seen and evaluated at bedside.  No acute issues.  Leukocytosis persists.  Afebrile.      09/24/2024:  Leukocytosis.  Remains afebrile.  Fluid in BERNABE drain remains dark green.  Seen by vascular surgeon with plan for left upper extremity AV fistula fistulogram.    09/25/2024: Leukocytosis persists.  He is otherwise stable.  Left inguinal drainage fluid sent for Gram stain and culture.  G stain was negative.  Culture in progress.  He remains on broad-spectrum antimicrobials.  He was seen by urologist yesterday with plan for conservative management of left scrotal edema with orchitis.  Repeat CT this morning showed gas in the abdomen resident concern for anastomotic leak.     09/26/2024:  He had exploratory laparotomy yesterday with colon resection and end colostomy, mobilization of splenic flexure and left inguinal canal exploration.  A Penrose drain was placed in the inguinal canal.  Left IJ Vas-Cath was removed yesterday.    09/27/2024:  Leukocytosis.  Fever cough trending down.  Cultures from surgery so far negative.  Pain control better.    Antibiotics (From admission, onward)      Start     Stop Route Frequency Ordered    09/24/24 1800  piperacillin-tazobactam 4.5 g in dextrose 5 % 100 mL IVPB (ready to mix)         -- IV Every 12 hours (non-standard times) 09/24/24 1035    09/23/24 1800  DAPTOmycin (CUBICIN) 1,340 mg in 0.9% NaCl SolP 50 mL IVPB         -- IV Every 48 hours (non-standard times) 09/22/24 1758    09/22/24 1800  DAPTOmycin (CUBICIN) 1,340 mg in 0.9% NaCl SolP 50 mL IVPB         -- IV Once 09/22/24 1801    09/17/24 2100   mupirocin 2 % ointment         09/22/24 2059 Nasl 2 times daily 09/17/24 1845          Antifungals (From admission, onward)      Start     Stop Route Frequency Ordered    09/22/24 1930  fluconazole (DIFLUCAN) IVPB 200 mg         -- IV Every 24 hours (non-standard times) 09/22/24 1520           Antivirals (From admission, onward)      None            ASSESSMENT and PLAN      1. Incarcerated left inguinal hernia with necrosis and left inguinal abscess.  Had I&D of abscess, partial sigmoid colectomy with primary anastomosis on 09/17/2024.  Then had anastomotic leak and went back to surgery 09/25/2024 for laparotomy, colectomy and colostomy with of left inguinal area.  Continue antibiotics    2. Leukocytosis.  As above    3. Scrotal edema worse on the left.  Diagnosed with orchitis by urologist.  Consider possibility of scrotal I&D in OR same time as laparotomy     4. ESRD on hemodialysis Monday/ Wednesday/Friday.  He is on transplant list.  Currently has a nonfunctioning left upper extremity AV fistula.    RECOMMENDATIONS:    DC daptomycin   Start linezolid IV/p.o.  Continue Zosyn and fluconazole  Reassess with cultures and ID of GNR    Please send Epic secure chat with any questions.    SUBJECTIVE      Antibiotics   Vancomycin: 09/17/2024-09/20/2024   Clindamycin:  09/07/2020 04/09/2020 2/24   Cefepime: 09/07/2024-09/22/2024   Daptomycin: 09/22/2024-  Fluconazole: 09/22/2024-  Zosyn: 09/22/2024-    Microbiology  Blood culture 09/07/2024: NGTD   Urine culture 09/19/2024: NGTD   Blood culture 09/24/2024:  NGTD   Left groin drainage culture 09/24/2024:  Lactose  GNR in broth only  Abdomen incision site culture 09/24/2024: Non lactose fermenting GNR in broth only    Review of Systems  Negative except as stated above in Interval History     OBJECTIVE   Temp:  [97.7 °F (36.5 °C)-100.6 °F (38.1 °C)] 99.9 °F (37.7 °C)  Pulse:  [] 113  Resp:  [12-33] 22  SpO2:  [90 %-100 %] 97 %  BP: (125-185)/(61-84)  134/61  Arterial Line BP: (109-185)/(54-82) 157/70  Temp:  [97.7 °F (36.5 °C)-100.6 °F (38.1 °C)]   Temp: 99.9 °F (37.7 °C) (09/27/24 0301)  Pulse: (!) 113 (09/27/24 0701)  Resp: (!) 22 (09/27/24 0904)  BP: 134/61 (09/27/24 0701)  SpO2: 97 % (09/27/24 0701)    Intake/Output Summary (Last 24 hours) at 9/27/2024 0906  Last data filed at 9/27/2024 0601  Gross per 24 hour   Intake 906.6 ml   Output 3530 ml   Net -2623.4 ml       Physical Exam  General:  Middle-aged man who is ill-looking.    Abdomen:  Distended.  Dry dressing midline.  Colostomy left lower abdomen with BERNABE drain.  Dressing left groin area with wound packing.  Genitals:  Firm Edematous scrotum worse on the left.  Nontender  CVS: S1 and 2 heard, no murmurs appreciated   Respiratory: Clear to auscultation   Skin: No rash appreciated   Musculoskeletal system: No joint or bony abnormalities appreciated   CNS: No gross focal deficits  Psych: Good mood, normal affect.    VAD:  Right radial arterial line, PIV  ISOLATION:  None    WOUNDS:  Abdominal surgical wounds, open left groin wound with packing    Significant Labs: All pertinent labs within the past 24 hours have been reviewed.    CBC LAST 7 DAYS  Recent Labs   Lab 09/22/24  0743 09/23/24  0918 09/24/24  1122 09/25/24  0236 09/25/24  1737 09/26/24  0501 09/26/24  0954 09/27/24  0359   WBC 16.59* 17.64* 18.77* 17.03* 22.95* 20.66*  --  20.31*   RBC 2.41* 2.61* 2.29* 2.26* 3.13* 3.00*  --  2.66*   HGB 7.6* 8.0* 7.1* 7.2* 9.5* 8.9* 8.2* 7.9*   HCT 24.2* 25.4* 22.6* 22.2* 29.4* 27.7*  --  25.3*   * 97 99* 98 94 92  --  95   MCH 31.5* 30.7 31.0 31.9* 30.4 29.7  --  29.7   MCHC 31.4* 31.5* 31.4* 32.4 32.3 32.1  --  31.2*   RDW 20.0* 19.9* 20.4* 20.0* 22.7* 23.3*  --  23.1*    357 384 389 470* 443  --  466*   MPV 11.7 10.5 10.3 10.3 10.3 10.5  --  10.5   GRAN 58.0 77.0* 70.0 81.0* 79.0* 81.0*  --  73.0   LYMPH 7.0* 12.0*  CANCELED 5.0* 3.0* 8.0* 0.0*  --  6.0*   MONO 10.0 2.0*  CANCELED 12.0 2.0*  "2.0* 8.0  --  7.0   BASO  --  CANCELED  --   --   --   --   --   --    NRBC 0 0 0 1* 0 0  --  0       CHEMISTRY LAST 7 DAYS  Recent Labs   Lab 09/22/24  0743 09/23/24  0918 09/24/24  1122 09/25/24  0236 09/25/24  1051 09/25/24  1737 09/26/24  0501 09/27/24  0359   * 132* 136 137  --  136 135* 137   K 4.9 4.0 4.2 4.0  --  4.4 5.3* 4.7   CL 97 93* 93* 97  --  98 96 97   CO2 21* 22* 22* 25  --  18* 24 24   ANIONGAP 16 17* 21* 15  --  20* 15 16   BUN 54* 61* 75* 46*  --  51* 39* 33*   CREATININE 14.5* 16.0* 17.7* 13.0*  --  13.4* 11.4* 10.3*    120* 101 136*  --  108 110 106   CALCIUM 8.7 9.1 8.8 8.9  --  8.7 8.7 9.0   PH  --   --   --   --  7.404  --   --   --    MG 2.1 2.2 2.3 2.2  --  2.2 2.0 2.2   ALBUMIN 3.2* 3.2* 3.0* 3.1*  --  3.0* 2.9* 2.9*   PROT 7.5 7.5 7.3 7.2  --  7.4 7.3 7.3   ALKPHOS 87 114 113 161*  --  152* 124 106   ALT 13 26 31 48*  --  45* 37 26   AST 33 46* 37 64*  --  49* 38 36   BILITOT 0.4 0.5 0.5 0.6  --  0.9 0.7 0.6       Estimated Creatinine Clearance: 13.5 mL/min (A) (based on SCr of 10.3 mg/dL (H)).    INFLAMMATORY/PROCAL  LAST 7 DAYS  Recent Labs   Lab 09/24/24  1514   PROCAL 13.932*   CRP 35.70*     No results found for: "ESR"  CRP   Date Value Ref Range Status   09/24/2024 35.70 (H) <1.00 mg/dL Final     Comment:     CRP-Normal Application expected values:   <1.0        mg/dL   Normal Range  1.0 - 5.0  mg/dL   Indicates mild inflammation  5.0 - 10.0 mg/dL   Indicates severe inflammation  >10.0        mg/dL   Represents serious processes and   frequently         indicates the presence of a bacterial   infection.      09/20/2024 >16.00 (H) <1.00 mg/dL Final     Comment:     CRP-Normal Application expected values:   <1.0        mg/dL   Normal Range  1.0 - 5.0  mg/dL   Indicates mild inflammation  5.0 - 10.0 mg/dL   Indicates severe inflammation  >10.0        mg/dL   Represents serious processes and   frequently         indicates the presence of a bacterial   infection.    "   07/16/2024 >16.00 (H) <1.00 mg/dL Final     Comment:     CRP-Normal Application expected values:   <1.0        mg/dL   Normal Range  1.0 - 5.0  mg/dL   Indicates mild inflammation  5.0 - 10.0 mg/dL   Indicates severe inflammation  >10.0        mg/dL   Represents serious processes and   frequently         indicates the presence of a bacterial   infection.          PRIOR  MICROBIOLOGY:    Susceptibility data from last 90 days.  Collected Specimen Info Organism Ceftriaxone Clindamycin Erythromycin Oxacillin Penicillin Tetracycline Trimeth/Sulfa Vancomycin   09/17/24 Blood from Peripheral, Antecubital, Right No growth after 5 days.           09/17/24 Blood from Peripheral, Antecubital, Right No growth after 5 days.           07/19/24 Catheter Tip, Tunneled Staphylococcus aureus  S  S  I  S  R  S  S  S   07/16/24 Blood from Peripheral, Forearm, Right Staphylococcus aureus           07/15/24 Blood from Peripheral, Antecubital, Right Staphylococcus aureus  S  S  S  S  R  S  S  S   07/15/24 Blood from Peripheral, Hand, Right Staphylococcus aureus               LAST 7 DAYS MICROBIOLOGY   Microbiology Results (last 7 days)       Procedure Component Value Units Date/Time    Blood culture [[id number]] Collected: 09/24/24 1513    Order Status: Completed Specimen: Blood Updated: 09/26/24 1632     Blood Culture, Routine No Growth to date      No Growth to date      No Growth to date    Narrative:      Collection has been rescheduled by [email or web address] at 09/23/2024 14:35 Reason:   Patient unavailable dialysis   Collection has been rescheduled by MYB at 09/23/2024 18:44 Reason:   Unable to collect  Collection has been rescheduled by CZB at 09/23/2024 19:02 Reason:   Unable to collect  Collection has been rescheduled by RE1 at 09/24/2024 09:43 Reason:   Spoke to the patient's nurse about unable to collect she is   contacting Lambert and the doctor  Collection has been rescheduled by [email or web address] at 09/23/2024 14:35 Reason:   Patient unavailable  dialysis   Collection has been rescheduled by MYB at 09/23/2024 18:44 Reason:   Unable to collect  Collection has been rescheduled by CZB at 09/23/2024 19:02 Reason:   Unable to collect  Collection has been rescheduled by RE1 at 09/24/2024 09:43 Reason:   Spoke to the patient's nurse about unable to collect she is   contacting Lambert and the doctor    Blood culture [[id number]] Collected: 09/25/24 1111    Order Status: Completed Specimen: Blood from Peripheral, Antecubital, Right Updated: 09/26/24 1432     Blood Culture, Routine No Growth to date      No Growth to date    Narrative:      [id number]    Blood culture [[id number]] Collected: 09/25/24 1111    Order Status: Completed Specimen: Blood from Peripheral, Forearm, Right Updated: 09/26/24 1432     Blood Culture, Routine No Growth to date      No Growth to date    Narrative:      [id number]    Gram stain [[id number]] Collected: 09/25/24 1413    Order Status: Completed Specimen: Incision site from Abdomen Updated: 09/26/24 1359     Gram Stain Result Moderate WBC's      No organisms seen    Blood culture [[id number]] Collected: 09/24/24 1122    Order Status: Completed Specimen: Blood Updated: 09/26/24 1232     Blood Culture, Routine No Growth to date      No Growth to date      No Growth to date    Narrative:      Collection has been rescheduled by [email or web address] at 09/23/2024 14:35 Reason:   Patient unavailable dialysis   Collection has been rescheduled by MYB at 09/23/2024 18:44 Reason:   Unable to collect  Collection has been rescheduled by CZB at 09/23/2024 19:02 Reason:   Unable to collect  Collection has been rescheduled by RE1 at 09/24/2024 09:43 Reason:   Spoke to the patient's nurse about unable to collect she is   contacting Lambert and the doctor  Collection has been rescheduled by [email or web address] at 09/23/2024 14:35 Reason:   Patient unavailable dialysis   Collection has been rescheduled by MYB at 09/23/2024 18:44 Reason:   Unable to collect  Collection has been rescheduled by CZB at  09/23/2024 19:02 Reason:   Unable to collect  Collection has been rescheduled by RE1 at 09/24/2024 09:43 Reason:   Spoke to the patient's nurse about unable to collect she is   contacting Lambert and the doctor    Aerobic culture [[id number]] Collected: 09/25/24 1413    Order Status: Completed Specimen: Incision site from Abdomen Updated: 09/26/24 0805     Aerobic Bacterial Culture No growth    Aerobic culture [[id number]] Collected: 09/24/24 1535    Order Status: Completed Specimen: Incision site from Groin Updated: 09/26/24 0755     Aerobic Bacterial Culture No growth    Narrative:      Left groin incision site drainage swab    Culture, Anaerobe [[id number]] Collected: 09/25/24 1413    Order Status: Sent Specimen: Incision site from Abdomen Updated: 09/25/24 1653    AFB Culture & Smear [[id number]] Collected: 09/25/24 1413    Order Status: Sent Specimen: Incision site from Abdomen Updated: 09/25/24 1652    Fungus culture [[id number]] Collected: 09/25/24 1413    Order Status: Sent Specimen: Incision site from Abdomen Updated: 09/25/24 1652    IV catheter culture [[id number]] Collected: 09/25/24 1132    Order Status: Sent Specimen: Catheter Tip, Dialysis Updated: 09/25/24 1141    Gram stain [[id number]] Collected: 09/24/24 1535    Order Status: Completed Specimen: Incision site from Groin Updated: 09/24/24 1829     Gram Stain Result Few WBC's      No organisms seen    Narrative:      Left groin incision site drainage    Blood culture x two cultures. Draw prior to antibiotics. [[id number]] Collected: 09/17/24 0949    Order Status: Completed Specimen: Blood from Peripheral, Antecubital, Right Updated: 09/22/24 1632     Blood Culture, Routine No growth after 5 days.    Narrative:      Aerobic and anaerobic    Blood culture x two cultures. Draw prior to antibiotics. [[id number]] Collected: 09/17/24 0949    Order Status: Completed Specimen: Blood from Peripheral, Antecubital, Right Updated: 09/22/24 1632     Blood Culture,  Routine No growth after 5 days.    Narrative:      Aerobic and anaerobic    Urine culture [[id number]] Collected: 09/19/24 1435    Order Status: Completed Specimen: Urine Updated: 09/22/24 0740     Urine Culture, Routine No growth    Narrative:      Specimen Source->Urine            CURRENT/PREVIOUS VISIT EKG  Results for orders placed or performed during the hospital encounter of 09/17/24   EKG 12-lead    Collection Time: 09/17/24  8:57 AM   Result Value Ref Range    QRS Duration 92 ms    [facility] QTC Calculation 469 ms    Narrative    Test Reason : N18.6,    Vent. Rate : 119 BPM     Atrial Rate : 119 BPM     P-R Int : 134 ms          QRS Dur : 092 ms      QT Int : 334 ms       P-R-T Axes : 027 -78 004 degrees     QTc Int : 469 ms    Sinus tachycardia  Left axis deviation  Cannot rule out Anterior infarct ,age undetermined  Abnormal ECG  When compared with ECG of 15-JUL-2024 17:47,  T wave inversion now evident in Inferior leads  T wave inversion no longer evident in Lateral leads  Confirmed by Jose Cruz MD ([id number]) on 9/22/2024 1:02:21 PM    Referred By: BRANDON   SELF           Confirmed By:Jose Cruz MD     Significant Imaging: I have reviewed all relevant and available imaging results/findings within the past 24 hours.    I spent a total of 55 minutes on the day of the visit.This includes face to face time and non-face to face time preparing to see the patient (eg, review of tests), obtaining and/or reviewing separately obtained history, documenting clinical information in the electronic or other health record, independently interpreting results and communicating results to the patient/family/caregiver, or care coordinator.    Kerwin Au MD  Date of Service: 09/27/2024      This note was created using [employer] voice recognition software that occasionally misinterpreted phrases or words.

## 2024-09-27 NOTE — CONSULTS
"Consult completed. See RD assessment.     Recommendations  Recommendation/Intervention:   1. Advance diet as tolerated to Renal.   2. Offer Nepro supplement should NPO be < 50% for >/= 5 days.   3. Educate patient for ostomy diet when appropriate.     Goals: 1. Diet will advance by follow up.  Nutrition Goal Status: new     Communication of RD Recs: discussed on rounds    Nutrition Diagnosis PES Statement: Altered GI function related to bowel resection as evidenced by patient post-op day 1 with ostomy, strict NPO status.     Dietitian Rounds Brief  RD screen for LOS 9. Patient not available yesterday. Discussed patient during rounds. Patient was still on Bipap this am. No physical signs of malnutrition per visual. RD to monitor for diet progression, tolerance and status PRN.     Nutrition Related Social Determinants of Health: SDOH: None Identified     Malnutrition Assessment   N/A    Diet order:   Current Diet Order: Strict NPO   Evaluation of Received Nutrient/Fluid Intake  Energy Calories Required: not meeting needs  Protein Required: not meeting needs  Fluid Required: meeting needs  Tolerance: other (see comments) (NPO)      % Intake of Estimated Energy Needs: 0%  % Meal Intake: NPO        Intake/Output Summary (Last 24 hours) at 9/26/2024 1554  Last data filed at 9/26/2024 0620      Gross per 24 hour   Intake 1565.53 ml   Output 2728 ml   Net -1162.47 ml         Anthropometrics  Temp: 97.7 °F (36.5 °C)  Height: 6' 2" (188 cm)  Height (inches): 74 in  Weight Method: Bed Scale  Weight: 134 kg (295 lb 6.7 oz)  Weight (lb): 295.42 lb  Ideal Body Weight (IBW), Male: 190 lb  % Ideal Body Weight, Male (lb): 148.95 %  BMI (Calculated): 37.9     Estimated/Assessed Needs  Weight Used For Calorie Calculations: 134 kg (295 lb 6.7 oz)  Energy Calorie Requirements (kcal): 1628-4216 kcal/day (11-14 kcal/kg)  Energy Need Method: Kcal/kg  Protein Requirements: 129-172 gm/day (1.5-2.0 gm/kg IBW / acute HD pt)  Weight Used For " Protein Calculations: 86 kg (189 lb 9.5 oz) (IBW)  Fluid Requirements (mL): 24 hour UOP + 1000 mL or per MD  Estimated Fluid Requirement Method: other (see comments)  RDA Method (mL): 1474  CHO Requirement: 184-234 gm/day (12-15 cho servings/day)     Reason for Assessment  Reason For Assessment: length of stay  Diagnosis: surgery/postoperative complications, gastrointestinal disease  Relevant Medical History: ESDR on dialysis MWF, awaiting kidney transplant, HTN  Interdisciplinary Rounds: attended  General Information Comments: ICU day 1. LOS day 9. Patient s/p Robotic sigmoid colectomy with isoperistaltic anastomosis   Left inguinal canal exploration with drainage of abscess 9/20/24. Patient went back to surgery yesterday.  Nutrition Discharge Planning: Renal diet as tolerated.     Nutrition/Diet History  Spiritual, Cultural Beliefs, Yazidism Practices, Values that Affect Care: no  Food Allergies: other (see comments) (MUSHROOM)     Nutrition Risk Screen  Nutrition Risk Screen: no indicators present  MST Score: 0  Have you recently lost weight without trying?: No  Weight loss score: 0  Have you been eating poorly because of a decreased appetite?: No  Appetite score: 0     Weight History:      Wt Readings from Last 10 Encounters:   09/18/24 134 kg (295 lb 6.7 oz)   08/29/24 131.3 kg (289 lb 7.4 oz)   08/22/24 131 kg (288 lb 12.8 oz)   07/30/24 131.1 kg (289 lb 0.4 oz)   07/17/24 129.3 kg (285 lb)   07/16/24 128.3 kg (282 lb 13.6 oz)   06/25/24 129.3 kg (285 lb 0.9 oz)   04/30/24 129.3 kg (285 lb)   02/27/24 133 kg (293 lb 3.4 oz)   02/21/24 136 kg (299 lb 13.2 oz)         Lab/Procedures/Meds: Pertinent Labs/Meds Reviewed     Medications:Pertinent Medications Reviewed  Scheduled Meds:   DAPTOmycin (CUBICIN) IV (PEDS and ADULTS)  10 mg/kg Intravenous Q48H    DAPTOmycin (CUBICIN) IV (PEDS and ADULTS)  10 mg/kg Intravenous Once    epoetin mily-epbx  10,000 Units Intravenous Every Mon, Wed, Fri    fluconazole (DIFLUCAN)  IV (PEDS and ADULTS)  200 mg Intravenous Q24H    gabapentin  200 mg Oral BID    heparin (porcine)  7,500 Units Subcutaneous Q8H    HYDROmorphone  1 mg Intravenous Once    LIDOcaine-prilocaine   Topical (Top) Once    metoprolol  5 mg Intravenous Q6H    nitroGLYCERIN 2% TD oint  1 inch Topical (Top) Q6H    piperacillin-tazobactam (Zosyn) IV (PEDS and ADULTS) (extended infusion is not appropriate)  4.5 g Intravenous Q12H      Continuous Infusions:   clevidipine  0-21 mg/hr Intravenous Continuous 4 mL/hr at 09/26/24 1139 2 mg/hr at 09/26/24 1139      PRN Meds:.  Current Facility-Administered Medications:     0.9% NaCl, , Intravenous, PRN    acetaminophen, 650 mg, Oral, Q8H PRN    acetaminophen, 650 mg, Oral, Q4H PRN    aluminum-magnesium hydroxide-simethicone, 30 mL, Oral, QID PRN    dextrose 10%, 12.5 g, Intravenous, PRN    dextrose 10%, 12.5 g, Intravenous, PRN    dextrose 10%, 12.5 g, Intravenous, PRN    dextrose 10%, 25 g, Intravenous, PRN    dextrose 10%, 25 g, Intravenous, PRN    dextrose 10%, 25 g, Intravenous, PRN    glucagon (human recombinant), 1 mg, Intramuscular, PRN    glucose, 16 g, Oral, PRN    glucose, 24 g, Oral, PRN    heparin (porcine), 4,000 Units, Intravenous, PRN    HYDROcodone-acetaminophen, 2 tablet, Oral, Q4H PRN    HYDROmorphone, 1 mg, Intravenous, Q2H PRN    insulin aspart U-100, 0-5 Units, Subcutaneous, QID (AC + HS) PRN    iohexoL, 100 mL, Intravenous, ONCE PRN    magnesium oxide, 800 mg, Oral, PRN    magnesium oxide, 800 mg, Oral, PRN    melatonin, 6 mg, Oral, Nightly PRN    naloxone, 0.02 mg, Intravenous, PRN    ondansetron, 4 mg, Intravenous, Q6H PRN    potassium bicarbonate, 35 mEq, Oral, PRN    potassium bicarbonate, 50 mEq, Oral, PRN    potassium bicarbonate, 60 mEq, Oral, PRN    potassium, sodium phosphates, 2 packet, Oral, PRN    potassium, sodium phosphates, 2 packet, Oral, PRN    potassium, sodium phosphates, 2 packet, Oral, PRN    sodium chloride 0.9%, 250 mL, Intravenous, PRN     "sodium chloride 0.9%, 3 mL, Intravenous, Q12H PRN     Labs: Pertinent Labs Reviewed  Clinical Chemistry:         Recent Labs   Lab 09/20/24  0153 09/25/24  0236 09/25/24  1737 09/26/24  0501   * 137 136 135*   K 5.0 4.0 4.4 5.3*   CL 95 97 98 96   CO2 26 25 18* 24    136* 108 110   BUN 42* 46* 51* 39*   CREATININE 13.4* 13.0* 13.4* 11.4*   CALCIUM 8.3* 8.9 8.7 8.7   PROT 6.9 7.2 7.4 7.3   ALBUMIN 3.1* 3.1* 3.0* 2.9*   BILITOT 0.6 0.6 0.9 0.7   ALKPHOS 71 161* 152* 124   AST 26 64* 49* 38   ALT 14 48* 45* 37   ANIONGAP 14 15 20* 15   MG 1.8 2.2 2.2 2.0   PHOS 5.2*  --   --   --     < > = values in this interval not displayed.      CBC:        Recent Labs   Lab 09/26/24  0501 09/26/24  0954   WBC 20.66*  --    RBC 3.00*  --    HGB 8.9* 8.2*   HCT 27.7*  --      --    MCV 92  --    MCH 29.7  --    MCHC 32.1  --       Lipid Panel:  No results for input(s): "CHOL", "HDL", "LDLCALC", "TRIG", "CHOLHDL" in the last 168 hours.  Cardiac Profile:      Recent Labs   Lab 09/20/24  0153   *   *      Inflammatory Labs:       Recent Labs   Lab 09/20/24  0152 09/24/24  1514   CRP >16.00* 35.70*      Diabetes:        Recent Labs   Lab 09/24/24  0749 09/24/24  1519 09/24/24  2025   POCTGLUCOSE 197* 101 132*      Thyroid & Parathyroid:  No results for input(s): "TSH", "FREET4", "N3LZCGO", "T5KJEJA", "THYROIDAB" in the last 168 hours.     Monitor and Evaluation  Food and Nutrient Intake: energy intake, food and beverage intake  Food and Nutrient Adminstration: diet order  Knowledge/Beliefs/Attitudes: food and nutrition knowledge/skill  Physical Activity and Function: nutrition-related ADLs and IADLs  Anthropometric Measurements: body mass index, weight change, weight  Biochemical Data, Medical Tests and Procedures: electrolyte and renal panel, gastrointestinal profile, glucose/endocrine profile, inflammatory profile, lipid profile  Nutrition-Focused Physical Findings: overall appearance      Nutrition " Risk  Level of Risk/Frequency of Follow-up:  (2 times / week)      Nutrition Follow-Up  RD Follow-up?: Yes

## 2024-09-27 NOTE — ASSESSMENT & PLAN NOTE
Monitor blood pressure.  Patient able to take oral antihypertensives again.  He's on hydralazine, losartan, clonidine, and metoprolol.  Adjust doses based on pressure response.

## 2024-09-27 NOTE — PROGRESS NOTES
Pt seen and examined.  REsting comfortably.  No significant ostomy output  No n/v.      Wt Readings from Last 3 Encounters:   09/18/24 134 kg (295 lb 6.7 oz)   08/29/24 131.3 kg (289 lb 7.4 oz)   08/22/24 131 kg (288 lb 12.8 oz)     Temp Readings from Last 3 Encounters:   09/27/24 98.2 °F (36.8 °C) (Oral)   08/22/24 97.3 °F (36.3 °C) (Temporal)   07/30/24 98.6 °F (37 °C) (Oral)     BP Readings from Last 3 Encounters:   09/27/24 (!) 100/56   08/29/24 99/68   08/22/24 (!) 137/91     Pulse Readings from Last 3 Encounters:   09/27/24 (!) 123   08/29/24 (!) 115   08/22/24 110     AAox3  Soft/nd/nt  Ostomy pink and patent    Lab Results   Component Value Date    WBC 20.31 (H) 09/27/2024    HGB 7.9 (L) 09/27/2024    HCT 25.3 (L) 09/27/2024    MCV 95 09/27/2024     (H) 09/27/2024       BMP  Lab Results   Component Value Date     09/27/2024    K 4.7 09/27/2024    CL 97 09/27/2024    CO2 24 09/27/2024    BUN 33 (H) 09/27/2024    CREATININE 10.3 (H) 09/27/2024    CALCIUM 9.0 09/27/2024    ANIONGAP 16 09/27/2024    EGFRNORACEVR 5.8 (A) 09/27/2024     A/P; s/p ex lap with Benítez's  Aggressive IS  Adv diet  Ambulate  Ok to transfer to floor

## 2024-09-27 NOTE — SUBJECTIVE & OBJECTIVE
Interval History:  Afebrile.  Post-op soreness is not controlled.  He's currently on the dialysis machine.  Continues to be tachycardic.    Review of Systems   Respiratory:  Negative for cough.    Cardiovascular:  Negative for chest pain.     Objective:     Vital Signs (Most Recent):  Temp: 98.2 °F (36.8 °C) (09/27/24 1215)  Pulse: (!) 123 (09/27/24 1445)  Resp: 18 (09/27/24 1445)  BP: 116/63 (09/27/24 1445)  SpO2: 97 % (09/27/24 1445) Vital Signs (24h Range):  Temp:  [98.2 °F (36.8 °C)-100.5 °F (38.1 °C)] 98.2 °F (36.8 °C)  Pulse:  [] 123  Resp:  [11-33] 18  SpO2:  [92 %-100 %] 97 %  BP: (100-155)/(54-89) 116/63  Arterial Line BP: (109-157)/(54-75) 157/70     Weight: 134 kg (295 lb 6.7 oz)  Body mass index is 37.93 kg/m².    Intake/Output Summary (Last 24 hours) at 9/27/2024 1541  Last data filed at 9/27/2024 1311  Gross per 24 hour   Intake 2056.6 ml   Output 7830 ml   Net -5773.4 ml         Physical Exam  Vitals reviewed.   Constitutional:       General: He is not in acute distress.     Appearance: He is not diaphoretic.   HENT:      Mouth/Throat:      Mouth: Mucous membranes are moist.   Eyes:      General: No scleral icterus.        Right eye: No discharge.         Left eye: No discharge.   Neck:      Vascular: No JVD.   Cardiovascular:      Rate and Rhythm: Regular rhythm. Tachycardia present.      Comments: R radial art line.  R cephalic vein midline    Pulmonary:      Effort: Pulmonary effort is normal.      Breath sounds: Normal breath sounds.   Abdominal:      General: Bowel sounds are normal. There is distension.      Palpations: Abdomen is soft.      Tenderness: There is abdominal tenderness.      Comments: Midline abd incision.    Incision left groin   Skin:     General: Skin is warm.      Findings: No rash.   Neurological:      Mental Status: He is alert.             Significant Labs: All pertinent labs within the past 24 hours have been reviewed.    Significant Imaging:  No new imaging

## 2024-09-27 NOTE — PROGRESS NOTES
"Pulmonary/Critical Care Progress Note      PATIENT NAME: João Ham  MRN: [id number]  TODAY'S DATE: 2024  10:56 AM  ADMIT DATE: 2024  AGE: 44 y.o. : 1980    CONSULT REQUESTED BY: Tera Pierre MD    REASON FOR CONSULT:   Severe sepsis    HPI:  The patient is a 44-year-old end-stage renal patient who had surgery a week ago for perforated sigmoid colon secondary to mesh with perforation into the inguinal canal.  The patient began running a fever last night.  Today he is still febrile in his blood pressure is getting softer.  The patient has significant discomfort in his lower abdomen bilaterally.  Scrotum is quite firm.  CT shows air in the abdomen with subcutaneous air as well.  Dr. Connors is planning to take him back to surgery today.  He was seen by Dr. Lopez who does not feel any intervention is necessary for his scrotum.     the patient is doing well postoperatively.  He is still on BiPAP this morning.  He now has a colostomy and a larger dressing over his inguinal canal.     the patient is without complaints.  He slept on his BiPAP last night he perceives this entire episode to be heavy".        REVIEW OF SYSTEMS  GENERAL: Feeling confused  EYES: Vision is good.  ENT: No sinusitis or pharyngitis.   HEART: No chest pain or palpitations.  LUNGS:  No dyspnea  GI:  Not complaining of abdominal pain  :  He denies scrotal pain.  SKIN: No lesions or rashes.  MUSCULOSKELETAL: No joint pain or myalgias.  NEURO: No headaches or neuropathy.  LYMPH: No edema or adenopathy.  PSYCH: No anxiety or depression.  ENDO: No weight change.    ANo change in the patient's Past Medical History, Past Surgical History, Social History or Family History since admission.      VITAL SIGNS (MOST RECENT)  Temp: 100 °F (37.8 °C) (24 230)  Pulse: 110 (24 2330)  Resp: 15 (24 0317)  BP: 139/62 (24 0543)  SpO2: 99 % (24 2330)    INTAKE AND OUTPUT (LAST 24 HOURS):  Intake/Output " Summary (Last 24 hours) at 9/27/2024 0633  Last data filed at 9/26/2024 1830  Gross per 24 hour   Intake 528.13 ml   Output 3525 ml   Net -2996.87 ml       WEIGHT  Wt Readings from Last 1 Encounters:   09/18/24 134 kg (295 lb 6.7 oz)       PHYSICAL EXAM  GENERAL:  Mid aged patient in no distress.  HEENT: Pupils equal and reactive. Extraocular movements intact. Nose intact. Pharynx moist.  NECK: Supple.   HEART:  Mildly tachycardic rate and rhythm. No murmur or gallop auscultated.  LUNGS: Clear to auscultation and percussion. Lung excursion symmetrical. No change in fremitus. No adventitial noises.  ABDOMEN: Bowel sounds present.  There is now a midline laparotomy incision. There is a colostomy on the left.  There is gas and serous fluid in it.  The stoma is pink.The BERNABE drain is draining a serosanguineous fluid.  : Normal anatomy.  He has a Penrose and bulky dressing over his scrotum.   EXTREMITIES: Normal muscle tone and joint movement, no cyanosis or clubbing.   LYMPHATICS: No adenopathy palpated, no edema.  SKIN: Dry, intact, no lesions.   NEURO: Cranial nerves II-XII intact. Motor strength 5/5 bilaterally, upper and lower extremities.  PSYCH: Appropriate affect      CBC LAST (LAST 24 HOURS)  Recent Labs   Lab 09/27/24  0359   WBC 20.31*   RBC 2.66*   HGB 7.9*   HCT 25.3*   MCV 95   MCH 29.7   MCHC 31.2*   RDW 23.1*   *   MPV 10.5   GRAN 73.0   LYMPH 6.0*   MONO 7.0   NRBC 0       CHEMISTRY LAST (LAST 24 HOURS)  Recent Labs   Lab 09/27/24  0359      K 4.7   CL 97   CO2 24   ANIONGAP 16   BUN 33*   CREATININE 10.3*      CALCIUM 9.0   MG 2.2   ALBUMIN 2.9*   PROT 7.3   ALKPHOS 106   ALT 26   AST 36   BILITOT 0.6         LAST 7 DAYS MICROBIOLOGY   Microbiology Results (last 7 days)       Procedure Component Value Units Date/Time    Blood culture [[id number]] Collected: 09/24/24 1513    Order Status: Completed Specimen: Blood Updated: 09/26/24 1632     Blood Culture, Routine No Growth to date       No Growth to date      No Growth to date    Narrative:      Collection has been rescheduled by [email or web address] at 09/23/2024 14:35 Reason:   Patient unavailable dialysis   Collection has been rescheduled by MYB at 09/23/2024 18:44 Reason:   Unable to collect  Collection has been rescheduled by CZB at 09/23/2024 19:02 Reason:   Unable to collect  Collection has been rescheduled by RE1 at 09/24/2024 09:43 Reason:   Spoke to the patient's nurse about unable to collect she is   contacting Lambert and the doctor  Collection has been rescheduled by [email or web address] at 09/23/2024 14:35 Reason:   Patient unavailable dialysis   Collection has been rescheduled by MYB at 09/23/2024 18:44 Reason:   Unable to collect  Collection has been rescheduled by CZB at 09/23/2024 19:02 Reason:   Unable to collect  Collection has been rescheduled by RE1 at 09/24/2024 09:43 Reason:   Spoke to the patient's nurse about unable to collect she is   contacting Lambert and the doctor    Blood culture [[id number]] Collected: 09/25/24 1111    Order Status: Completed Specimen: Blood from Peripheral, Antecubital, Right Updated: 09/26/24 1432     Blood Culture, Routine No Growth to date      No Growth to date    Narrative:      [id number]    Blood culture [[id number]] Collected: 09/25/24 1111    Order Status: Completed Specimen: Blood from Peripheral, Forearm, Right Updated: 09/26/24 1432     Blood Culture, Routine No Growth to date      No Growth to date    Narrative:      [id number]    Gram stain [[id number]] Collected: 09/25/24 1413    Order Status: Completed Specimen: Incision site from Abdomen Updated: 09/26/24 1359     Gram Stain Result Moderate WBC's      No organisms seen    Blood culture [[id number]] Collected: 09/24/24 1122    Order Status: Completed Specimen: Blood Updated: 09/26/24 1232     Blood Culture, Routine No Growth to date      No Growth to date      No Growth to date    Narrative:      Collection has been rescheduled by [email or web address] at 09/23/2024 14:35 Reason:   Patient  unavailable dialysis   Collection has been rescheduled by MYB at 09/23/2024 18:44 Reason:   Unable to collect  Collection has been rescheduled by CZB at 09/23/2024 19:02 Reason:   Unable to collect  Collection has been rescheduled by RE1 at 09/24/2024 09:43 Reason:   Spoke to the patient's nurse about unable to collect she is   contacting Lambert and the doctor  Collection has been rescheduled by [email or web address] at 09/23/2024 14:35 Reason:   Patient unavailable dialysis   Collection has been rescheduled by MYB at 09/23/2024 18:44 Reason:   Unable to collect  Collection has been rescheduled by CZB at 09/23/2024 19:02 Reason:   Unable to collect  Collection has been rescheduled by RE1 at 09/24/2024 09:43 Reason:   Spoke to the patient's nurse about unable to collect she is   contacting Lambert and the doctor    Aerobic culture [[id number]] Collected: 09/25/24 1413    Order Status: Completed Specimen: Incision site from Abdomen Updated: 09/26/24 0805     Aerobic Bacterial Culture No growth    Aerobic culture [[id number]] Collected: 09/24/24 1535    Order Status: Completed Specimen: Incision site from Groin Updated: 09/26/24 0755     Aerobic Bacterial Culture No growth    Narrative:      Left groin incision site drainage swab    Culture, Anaerobe [[id number]] Collected: 09/25/24 1413    Order Status: Sent Specimen: Incision site from Abdomen Updated: 09/25/24 1653    AFB Culture & Smear [[id number]] Collected: 09/25/24 1413    Order Status: Sent Specimen: Incision site from Abdomen Updated: 09/25/24 1652    Fungus culture [[id number]] Collected: 09/25/24 1413    Order Status: Sent Specimen: Incision site from Abdomen Updated: 09/25/24 1652    IV catheter culture [[id number]] Collected: 09/25/24 1132    Order Status: Sent Specimen: Catheter Tip, Dialysis Updated: 09/25/24 1141    Gram stain [[id number]] Collected: 09/24/24 1535    Order Status: Completed Specimen: Incision site from Groin Updated: 09/24/24 1829     Gram Stain Result Few  WBC's      No organisms seen    Narrative:      Left groin incision site drainage    Blood culture x two cultures. Draw prior to antibiotics. [[id number]] Collected: 09/17/24 0949    Order Status: Completed Specimen: Blood from Peripheral, Antecubital, Right Updated: 09/22/24 1632     Blood Culture, Routine No growth after 5 days.    Narrative:      Aerobic and anaerobic    Blood culture x two cultures. Draw prior to antibiotics. [[id number]] Collected: 09/17/24 0949    Order Status: Completed Specimen: Blood from Peripheral, Antecubital, Right Updated: 09/22/24 1632     Blood Culture, Routine No growth after 5 days.    Narrative:      Aerobic and anaerobic    Urine culture [[id number]] Collected: 09/19/24 1435    Order Status: Completed Specimen: Urine Updated: 09/22/24 0740     Urine Culture, Routine No growth    Narrative:      Specimen Source->Urine            MOST RECENT IMAGING  X-Ray Abdomen AP with Left Decub  Narrative: EXAMINATION:  XR ABDOMEN AP WITH LEFT DECUB    CLINICAL HISTORY:  Post-op Abd distension.   Get erect film;    FINDINGS:  Left lateral decubitus abdominal radiographs.  There are multiple loops of gas and stool filled bowel.  There is no collection or free air demonstrated.  Impression: No pneumoperitoneum identified.    Electronically signed by: Thompson Lee  Date:    09/26/2024  Time:    15:04  X-Ray Chest AP Portable  Narrative: EXAMINATION:  XR CHEST AP PORTABLE    CLINICAL HISTORY:  post op;    FINDINGS:  Portable chest at 17:29 hours is compared to a prior study at 09:02 shows normal cardiomediastinal silhouette.    Atelectasis within the lower mid lungs.  The upper lungs are clear.  There are no pleural effusions or pneumothorax.  Pulmonary vasculature is normal. No acute osseous abnormality.  Impression: Atelectasis within the lower mid lungs    Electronically signed by: Shannon Mercado  Date:    09/26/2024  Time:    07:57      CURRENT VISIT EKG  Results for orders placed or  performed during the hospital encounter of 09/17/24   EKG 12-lead    Narrative    Ordered by an unspecified provider.       ECHOCARDIOGRAM RESULTS  Results for orders placed during the hospital encounter of 07/15/24    Echo    Interpretation Summary    Limited echo only for EF and to look at valves due to bacteremia.    Left Ventricle: The left ventricle is normal in size. Increased wall thickness. There is concentric remodeling. There is normal systolic function with a visually estimated ejection fraction of 55 - 60%.    Aortic Valve: There is no mass/vegetation present.    Mitral Valve: There is no mass/vegetation present.    Tricuspid Valve: There is no mass/vegetation present.    Pulmonic Valve: There is no mass/vegetation present.            LAST ARTERIAL BLOOD GAS  ABG  Recent Labs   Lab 09/25/24  1051   PH 7.404   PO2 87   PCO2 37.7   HCO3 23.6*   BE -1       IMPRESSION AND PLAN  Intrabominal sepsis  - anastomotic leak found and washed out.  The patient now has a colostomy.  - continue Zosyn  - now on daptomycin for some reason  - all cultures remain negative at this time  ESRD  - will dialyze again today  - mildly hyperkalemic  CLOVIS  - sleeps on PAP, there is no one to get his machine from his house, will continue to use ours  - he is on BiPAP with a max IPAP of 18 min EPAP of 14 and 4 of pressure support at home  - no evidence of hypercapnia  Bilateral epididyrmal orchitis with a complex hydrocele  - there is a Penrose and a bulky dressing over the scrotum and left groin  Anemia  - patient was transfused 2 units of packed red blood cells intraoperatively  Leukocytosis  - expected with the intra-abdominal process  - stable  Morbid obesity/moderate hypoalbuminemia  Hypertension  - will need to resume his home meds    Out of bed  Patient is stable to move out of the ICU   Discontinue arterial line  Clear liquids authorized by Dr. Connors  Resume Lovenox if okay with Dr. Connors  Resume oral  antihypertensives    Discussed with nursing and patient.      Kisha Rodriguez MD  Date of Service: 09/27/2024  10:56 AM

## 2024-09-27 NOTE — ASSESSMENT & PLAN NOTE
-patient had open laparotomy yesterday with resection of affected colon and creation of colostomy.  Continue systemic antibiotics.  General surgeon following up daily.  ID consultant following as well.

## 2024-09-27 NOTE — PT/OT/SLP PROGRESS
Physical Therapy      Patient Name:  João Ham   MRN:  [id number]    Patient not seen today secondary to Dialysis, Other (Comment) (Dialysis late am & PT unavailable in pm.). Will follow-up 9/28/24.

## 2024-09-27 NOTE — CARE UPDATE
09/27/24 0834   Patient Assessment/Suction   Level of Consciousness (AVPU) alert   Respiratory Effort Normal   PRE-TX-O2   Device (Oxygen Therapy) nasal cannula   $ Is the patient on Low Flow Oxygen? Yes   Flow (L/min) (Oxygen Therapy) 2   Pulse Oximetry Type Continuous   $ Pulse Oximetry - Multiple Charge Pulse Oximetry - Multiple   Aerosol Therapy   $ Aerosol Therapy Charges PRN treatment not required   Respiratory Treatment Status (SVN) PRN treatment not required   Preset CPAP/BiPAP Settings   Mode Of Delivery Standby

## 2024-09-27 NOTE — SUBJECTIVE & OBJECTIVE
Interval History:  continues to have fevers.  Has some air output from the colostomy.  He's on 4 LPM by NC.    Review of Systems   Respiratory:  Negative for cough.    Cardiovascular:  Negative for chest pain.     Objective:     Vital Signs (Most Recent):  Temp: (!) 100.5 °F (38.1 °C) (09/26/24 1830)  Pulse: 110 (09/26/24 1830)  Resp: 20 (09/26/24 1901)  BP: (!) 146/68 (09/26/24 1501)  SpO2: 98 % (09/26/24 1830) Vital Signs (24h Range):  Temp:  [97.7 °F (36.5 °C)-102.6 °F (39.2 °C)] 100.5 °F (38.1 °C)  Pulse:  [] 110  Resp:  [12-25] 20  SpO2:  [89 %-100 %] 98 %  BP: (105-185)/(54-84) 146/68  Arterial Line BP: ()/(46-82) 151/71     Weight: 134 kg (295 lb 6.7 oz)  Body mass index is 37.93 kg/m².    Intake/Output Summary (Last 24 hours) at 9/26/2024 1915  Last data filed at 9/26/2024 1830  Gross per 24 hour   Intake 1489.96 ml   Output 6045 ml   Net -4555.04 ml         Physical Exam  Vitals reviewed.   Constitutional:       General: He is not in acute distress.     Appearance: He is not diaphoretic.   HENT:      Mouth/Throat:      Mouth: Mucous membranes are moist.   Eyes:      General: No scleral icterus.        Right eye: No discharge.         Left eye: No discharge.   Neck:      Vascular: No JVD.   Cardiovascular:      Rate and Rhythm: Regular rhythm. Tachycardia present.      Comments: R radial art line.  R cephalic vein midline    Pulmonary:      Effort: Pulmonary effort is normal.      Breath sounds: Normal breath sounds.   Abdominal:      General: Bowel sounds are normal. There is distension.      Palpations: Abdomen is soft.      Tenderness: There is abdominal tenderness.      Comments: Midline abd incision.    Incision left groin   Skin:     General: Skin is warm.      Findings: No rash.   Neurological:      Mental Status: He is alert.             Significant Labs: All pertinent labs within the past 24 hours have been reviewed.    Significant Imaging: I have reviewed all pertinent imaging  results/findings within the past 24 hours.

## 2024-09-27 NOTE — PLAN OF CARE
09/27/24 1437   Post-Acute Status   Post-Acute Authorization Placement   Post-Acute Placement Status Patient List Provided   Patient choice form signed by patient/caregiver List from CMS Compare   Discharge Plan   Discharge Plan A Rehab     Sw provided Patient Choices for [facility]. Pt chose [facility] and reported that he wants to be close to [place]. Pt signed and paperwork was scanned and referral was placed via careport.

## 2024-09-27 NOTE — NURSING
Nurses Note -- 4 Eyes      9/27/2024   6:20AM      Skin assessed during: Daily Assessment      [] No Altered Skin Integrity Present    []Prevention Measures Documented      [x] Yes- Altered Skin Integrity Present or Discovered   [] LDA Added if Not in Epic (Describe Wound)   [] New Altered Skin Integrity was Present on Admit and Documented in LDA   [] Wound Image Taken    Wound Care Consulted? Yes    Attending Nurse:  Myrtle Hahn RN/Staff Member:  Chad BENITEZ

## 2024-09-27 NOTE — NURSING
Consent and Hep b status verified, removed 3800 uf net, no issues with AVF, post thrill and bruit noted, patient stable throughout treatment, educated patient on infection control, report given to ELI Ma   09/27/24 1215   Handoff Report   Received From ELI Lozano   Given To ELI Ma   Vital Signs   Temp 98.2 °F (36.8 °C)   Temp Source Oral   Pulse (!) 124   Heart Rate Source Monitor;Continuous   Resp 14   SpO2 98 %   Pulse Oximetry Type Continuous   Probe Placed On (Pulse Ox) Left:;ear   Oximetry Probe Status Assessed;Intact;No Change Needed   Flow (L/min) (Oxygen Therapy) 2   Device (Oxygen Therapy) nasal cannula   BP (!) 100/56   MAP (mmHg) 61   BP Location Right arm   BP Method Automatic   Patient Position Lying   Assessments (Pre/Post)   Consent Obtained yes   Safety vein preservation armband present   Date Hepatitis Profile Obtained 09/19/24   Blood Liters Processed (BLP) 55   Transport Modality bed   Level of Consciousness (AVPU) responds to voice   Dialyzer Clearance mildly streaked   Pain   Preferred Pain Scale number (Numeric Rating Pain Scale)   Comfort/Acceptable Pain Level 0   Pain Body Location - Side Bilateral   Pain Body Location - Orientation incisional   Pain Body Location abdomen   Pain Rating (0-10): Rest 4   Pain Rating (0-10): Activity 4   Pain Management Interventions quiet environment facilitated;pillow support provided;position adjusted   Pain Reassessment   Pain Rating Post Med Admin 4   Pre-Hemodialysis Assessment   Additional Dialysis Information Needed Yes   Patient Status Departed   Treatment Consent Verified Yes   Treatment Status Completed        Hemodialysis AV Fistula Left forearm   No placement date or time found.   Present Prior to Hospital Arrival?: Yes  Size/Length: 17 G  Location: Left forearm   Site Assessment Clean;Dry;Intact   Patency Present;Thrill;Bruit   Status Deaccessed   Dressing Intervention First dressing   Dressing Status Clean;Dry;Intact   Site Condition No  complications   Dressing Gauze   Post-Hemodialysis Assessment   Rinseback Volume (mL) 250 mL   Blood Volume Processed (Liters) 55 L   Dialyzer Clearance Lightly streaked   Duration of Treatment 180 minutes   Additional Fluid Intake (mL) 500 mL   Total UF (mL) 4300 mL   Net Fluid Removal 3800   Patient Response to Treatment miya   Edema   Edema generalized;scrotum

## 2024-09-27 NOTE — ASSESSMENT & PLAN NOTE
Anemia is likely due to chronic disease due to ESRD. Most recent hemoglobin and hematocrit are listed below.  Recent Labs     09/25/24  1737 09/26/24  0501 09/26/24  0954 09/27/24  0359   HGB 9.5* 8.9* 8.2* 7.9*   HCT 29.4* 27.7*  --  25.3*       Plan  - Monitor serial CBC: Daily  - HGB stable at this time

## 2024-09-27 NOTE — CONSULTS
RN assessed patients colostomy post dialysis. Patient groggy and unable to be educated due to alertness. RN assessed stoma, stoma red and and wet, minimal brown, thin, liquid , noted in ostomy pouch. RN will follow up with patient when more alert.       All other dressings on ABD and groin, WNL and clean, dry, and intact.

## 2024-09-27 NOTE — PROGRESS NOTES
[facility] Medicine  Progress Note    Patient Name: João Ham  MRN: [id number]  Patient Class: IP- Inpatient   Admission Date: 9/17/2024  Length of Stay: 9 days  Attending Physician: Tera Pierre MD  Primary Care Provider: Dawson Granado MD        Subjective:     Principal Problem:Inguinal hernia of left side with gangrene        HPI:  Patient is a 44 year old male with history of ESDR on dialysis MWF, awaiting kidney transplant, HTN, presented to ED with 3 days history of left groin pain and swelling. States swelling comes every time he cough and is being painful. Patient has low grade fever 99s at home. He has been vomiting bilious fluid last 2-3 days. No diarrhea or abdominal pain.     In the ED CT abdomen showed Left inguinal hernia containing fat and air as well as marked inflammation extending from inside the pelvis, in the hernia and down into the left scrotum. This is consistent with an infected inguinal hernia, possible gangrene. WBC was 14, patient was tachycardic. General surgery was consulted and patient was admitted under hospitalist.     Overview/Hospital Course:  Patient is a 44 year old male with history of ESRD, was admitted with suspected inguinal hernia gangrene. General surgery was consulted and patient was taken to OR. Found to have colonic perforation due to mesh erosion with an abscess. Patient underwent sigmoid resection and drainage of the abscess, was started on clindamycin, Vancomycin and cefepime. Nephrology was consulted for chronic dialysis. AVF was not functioning, General surgery consulted, trialysis catheter placed. PRBC transfused during dialysis for low Hb 6.6.  While on broad-spectrum coverage, patient is spiking fevers and worsening leukocytosis, id consulted, discontinued clindamycin, vancomycin and cefepime-added daptomycin, Diflucan and Zosyn.  Repeated CT abdomen and pelvis that showed residual abscess/phlegmon and contained perforation.  Re-consulted surgery who mentioned likely post op changes. Fistulogram 9/24 by vascular surgery for declotting the AV fistula. Trialysis removed 9/25. Had worsening white count, fevers upto 103.5 and hypotension overnight 9/25.  Also had hemoglobin of 7, with worsening hypoxia up to 7 L (overnight desaturated to 70% with lethargy requiring BiPAP placement), after which nephrology recommended 1 unit PRBC transfusion.  Id, Nephrology, Urology, General surgery were updated about his worsening condition.We CT repeated that showed intraperitoneal free air and findings suggestive of necrotizing fasciitis, general surgery was consulted stat, patient was transferred to ICU.    Interval History:  continues to have fevers.  Has some air output from the colostomy.  He's on 4 LPM by NC.    Review of Systems   Respiratory:  Negative for cough.    Cardiovascular:  Negative for chest pain.     Objective:     Vital Signs (Most Recent):  Temp: (!) 100.5 °F (38.1 °C) (09/26/24 1830)  Pulse: 110 (09/26/24 1830)  Resp: 20 (09/26/24 1901)  BP: (!) 146/68 (09/26/24 1501)  SpO2: 98 % (09/26/24 1830) Vital Signs (24h Range):  Temp:  [97.7 °F (36.5 °C)-102.6 °F (39.2 °C)] 100.5 °F (38.1 °C)  Pulse:  [] 110  Resp:  [12-25] 20  SpO2:  [89 %-100 %] 98 %  BP: (105-185)/(54-84) 146/68  Arterial Line BP: ()/(46-82) 151/71     Weight: 134 kg (295 lb 6.7 oz)  Body mass index is 37.93 kg/m².    Intake/Output Summary (Last 24 hours) at 9/26/2024 1915  Last data filed at 9/26/2024 1830  Gross per 24 hour   Intake 1489.96 ml   Output 6045 ml   Net -4555.04 ml         Physical Exam  Vitals reviewed.   Constitutional:       General: He is not in acute distress.     Appearance: He is not diaphoretic.   HENT:      Mouth/Throat:      Mouth: Mucous membranes are moist.   Eyes:      General: No scleral icterus.        Right eye: No discharge.         Left eye: No discharge.   Neck:      Vascular: No JVD.   Cardiovascular:      Rate and Rhythm:  Regular rhythm. Tachycardia present.      Comments: R radial art line.  R cephalic vein midline    Pulmonary:      Effort: Pulmonary effort is normal.      Breath sounds: Normal breath sounds.   Abdominal:      General: Bowel sounds are normal. There is distension.      Palpations: Abdomen is soft.      Tenderness: There is abdominal tenderness.      Comments: Midline abd incision.    Incision left groin   Skin:     General: Skin is warm.      Findings: No rash.   Neurological:      Mental Status: He is alert.             Significant Labs: All pertinent labs within the past 24 hours have been reviewed.    Significant Imaging: I have reviewed all pertinent imaging results/findings within the past 24 hours.    Assessment/Plan:      * Inguinal hernia of left side with gangrene  Colonic perforation with abscess  CT abdomen shows Left inguinal hernia containing fat and air as well as marked inflammation extending from inside the pelvis, in the hernia and down into the left scrotum. This is consistent with an infected inguinal hernia, possible gangrene.   Patient was taken to OR and found to have colonic perforation due to mesh erosion. Patient underwent sigmoid resection and I&D on 9/17.    Hyperkalemia  Hyperkalemia is likely due to ESRD.The patients most recent potassium results are listed below.  Recent Labs     09/25/24  0236 09/25/24  1737 09/26/24  0501   K 4.0 4.4 5.3*     Plan  - Monitor for arrhythmias with EKG and/or continuous telemetry.   - Treat the hyperkalemia with Potassium Binders and hemodialysis .   - Monitor potassium: Daily  - The patient's hyperkalemia is stable            Sepsis  This patient does have evidence of infective focus  My overall impression is sepsis.  Source: Abdominal  Antibiotics given-   Antibiotics (72h ago, onward)      Start     Stop Route Frequency Ordered    09/24/24 1800  piperacillin-tazobactam 4.5 g in dextrose 5 % 100 mL IVPB (ready to mix)         -- IV Every 12 hours  (non-standard times) 09/24/24 1035    09/23/24 1800  DAPTOmycin (CUBICIN) 1,340 mg in 0.9% NaCl SolP 50 mL IVPB         -- IV Every 48 hours (non-standard times) 09/22/24 1758    09/22/24 1800  DAPTOmycin (CUBICIN) 1,340 mg in 0.9% NaCl SolP 50 mL IVPB         -- IV Once 09/22/24 1801          Latest lactate reviewed-  Recent Labs   Lab 09/24/24  1122   LACTATE 0.6       Source control achieved by: IV antibiotics and surgical intervention    Acute hypoxic respiratory failure  Continue supplementing oxygen.  Monitor saturations.  Pulmonology service is consulting.    Perforation of intestine  -patient had open laparotomy yesterday with resection of affected colon and creation of colostomy.  Continue systemic antibiotics.  General surgeon following up daily.  ID consultant following as well.    Dialysis AV fistula malfunction  Resolved.      Essential hypertension  Monitor blood pressure while he gets metoprolol IV on a scheduled basis.  Pressures are controlled for the most part.    Sleep apnea  CPAP per home settings       Pulmonary hypertension  Chronic.  No need to address acutely.    Severe obesity with body mass index (BMI) of 35.0 to 39.9 with comorbidity  Body mass index is 37.93 kg/m². Morbid obesity complicates all aspects of disease management from diagnostic modalities to treatment. Weight loss encouraged and health benefits explained to patient.         Anemia due to chronic kidney disease, on chronic dialysis  Anemia is likely due to chronic disease due to ESRD. Most recent hemoglobin and hematocrit are listed below.  Recent Labs     09/25/24  0236 09/25/24  1737 09/26/24  0501 09/26/24  0954   HGB 7.2* 9.5* 8.9* 8.2*   HCT 22.2* 29.4* 27.7*  --        Plan  - Monitor serial CBC: Daily  - HGB stable at this time    ESRD (end stage renal disease)  Nephrology service ordering routine dialysis      VTE Risk Mitigation (From admission, onward)           Ordered     heparin (porcine) injection 4,000 Units  As  needed (PRN)         09/20/24 1544     heparin (porcine) injection 7,500 Units  Every 8 hours         09/17/24 1031     IP VTE HIGH RISK PATIENT  Once         09/17/24 1031     Place sequential compression device  Until discontinued         09/17/24 1031                    Discharge Planning   MARIA ISABEL:      Code Status: Full Code   Is the patient medically ready for discharge?:     Reason for patient still in hospital (select all that apply): Patient trending condition and Treatment  Discharge Plan A: Home with family            Tera Pierre MD  Department of Hospital Medicine   [facility]

## 2024-09-27 NOTE — PT/OT/SLP PROGRESS
Occupational Therapy      Patient Name:  João Ham   MRN:  [id number]    Patient not seen today secondary to Dialysis.     9/27/2024

## 2024-09-27 NOTE — CONSULTS
Patient receiving dialysis during attempted visit. Will follow up for ostomy care and teaching.

## 2024-09-27 NOTE — ASSESSMENT & PLAN NOTE
Anemia is likely due to chronic disease due to ESRD. Most recent hemoglobin and hematocrit are listed below.  Recent Labs     09/25/24  0236 09/25/24  1737 09/26/24  0501 09/26/24  0954   HGB 7.2* 9.5* 8.9* 8.2*   HCT 22.2* 29.4* 27.7*  --        Plan  - Monitor serial CBC: Daily  - HGB stable at this time

## 2024-09-27 NOTE — ASSESSMENT & PLAN NOTE
-patient had open laparotomy with resection of affected colon and creation of colostomy.  He is on post-op day 2.  Continue systemic antibiotics.  General surgeon following up daily.  ID consultant following as well.

## 2024-09-27 NOTE — ASSESSMENT & PLAN NOTE
This patient does have evidence of infective focus  My overall impression is sepsis.  Source: Abdominal  Antibiotics given-   Antibiotics (72h ago, onward)      Start     Stop Route Frequency Ordered    09/27/24 1015  linezolid 600 mg/300 mL IVPB 600 mg         -- IV Every 12 hours (non-standard times) 09/27/24 0909    09/24/24 1800  piperacillin-tazobactam 4.5 g in dextrose 5 % 100 mL IVPB (ready to mix)         -- IV Every 12 hours (non-standard times) 09/24/24 1035            Source control achieved by: IV antibiotics and surgical intervention

## 2024-09-27 NOTE — PROGRESS NOTES
INPATIENT NEPHROLOGY Progress  [facility] NEPHROLOGY    João Ham  09/27/2024    Reason for consultation:  ESRD    Chief Complaint:   Chief Complaint   Patient presents with    Fatigue    Groin Swelling     X 2 days.  Hx of Kidney failure and CHF     History of Present Illness:  Per H and P    Patient is a 44 year old male with history of ESDR on dialysis MWF, awaiting kidney transplant, HTN, presented to ED with 3 days history of left groin pain and swelling. States swelling comes every time he cough and is being painful. Patient has low grade fever 99s at home. He has been vomiting bilious fluid last 2-3 days. No diarrhea or abdominal pain.      In the ED CT abdomen showed Left inguinal hernia containing fat and air as well as marked inflammation extending from inside the pelvis, in the hernia and down into the left scrotum. This is consistent with an infected inguinal hernia, possible gangrene. WBC was 14, patient was tachycardic. General surgery was consulted and patient was admitted under hospitalist.     9/19  avf nonfunctional.   No sob or chest pain.  Has post op pain.  AFVSS  9/20  afvss.  Pt doesn't want to see previous vascular surgeon who put his avf in.  No alternative available.  Transfer center called.  Has temporary trialysis catheter.  Patient seen on hemodialysis for uremic clearance and ultrafiltration.    9/21  2.5L off w/HD yesterday.  Tmax 101.8, pressures ok.  Lab results reviewed, Hgb low but better than yesterday.  C/o post op pain, no other complaints.  9/22 POD 5.  VSS, lab results reviewed.  Hgb 7.6, added epo to HD orders.  C/o gas pains, plans to move around more today.  Next HD tomorrow.  9/23 VSS. HD today. Transfuse with next HD as needed if Hgb stil low.  9/24 VSS. Appreciate input from Urology, Gen Surgery, Vascular Surgery, ID on this complex patient. Plan for fistulogram tomorrow. CXR yesterday shows trialysis line tip in appropriate cocation. Will attempt HD today since we  skipped yesterday due to nurse and patient concerns that the line may be malpositioned.  9/25 VSS. s/p cephalic vein dilation by Dr. Robledo yesterday, tolerated HD very well. Hold HD today. Hypotension this AM, low grade fever yesterday, WBC elevated, received IVF bolus and Midodrine. Continues to be infected per Surgery, we can remove trialysis line now that AVF is treated and usable... Consider ICU. Stopped hydralazine and olmesartan, decreased Clonidine to 0.1 BID, not sure what to do with Metoprolol 150mg and Hydralazine 120mg... He may be sob/hypoxic due to anemia, suggest 1 PRBC instead of IVF, since Hgb is 7.    Addendum @ 10:47 AM  Seen at bedside in ICU with Dr. Rodriguez and Dr. Au. Mother present as well. Reviewed imaging. Agree with plan for urgent ex lap, remove central line and place mid line, keep current abx, hold on transfusion and dialysis and reassess later. ABG and fresh set of cx now, re-evalaute later.    Addendum @ 4:36 PM  Discussed briefly with Dr. Connors, dialysis nurse Brant and with ICU nurse. Patient had colostomy placed due to anastomotic leak and had 2 PRBC given. Upon return to floor was hypertensive with SBP > 200 and NPO. Advised Cleviprex drip for now (earlier to day he was on max dose Clonidine, mad dose Hydralazine, max dose Olmesartan, 150mg Metoprolol-XL and 120mg of Imdur - none of which he can get due to NPO) and will do urgent HD with UF 2L or so as tolerated.    9/26  POD 1 s/p . Exploratory laparotomy,. Colon resection end-colostomy, Mobilization of splenic flexure, and left inguinal canal exploration.  Post pain. No sob.  Sleepy.    9/27  AFVSS.  Some post op pain.  No sob.  No angina.   Patient seen on hemodialysis for uremic clearance and ultrafiltration.           Plan of Care:    ESRD on HD MWF  --continue dialysis per routine  --renal dose medication per routine  --supplemental dialysis done yesterday with 3 liter UF    Anemia of CKD  --erythropoiesis stimulating  agent with renal replacement therapy  --transfuse 1 PRBC due to hypoxic respiratory failure    Secondary HPT  --renal diet  --continue binders with meals    Hypertension  Hypotension with low grade fevers ? sepsis  --uf with hd as needed  --continue sepsis therapy  --line can be removed  --gave blood, careful with IVF    Hyperkalemia  --2k dialysate  --low k diet  --better today    AVF malfunction fixed  --got temporary line, CXR on 9/23 confirms tip in typical location, unchanged.  --appreciate Vascular eval, s/p fistulogram and cephalic vein stenosis dilation on 9/23 by Dr. Polk.      Thank you for allowing us to participate in this patient's care. We will continue to follow.    Vital Signs:  Temp Readings from Last 3 Encounters:   09/27/24 98.2 °F (36.8 °C) (Oral)   08/22/24 97.3 °F (36.3 °C) (Temporal)   07/30/24 98.6 °F (37 °C) (Oral)       Pulse Readings from Last 3 Encounters:   09/27/24 (!) 121   08/29/24 (!) 115   08/22/24 110       BP Readings from Last 3 Encounters:   09/27/24 127/81   08/29/24 99/68   08/22/24 (!) 137/91       Weight:  Wt Readings from Last 3 Encounters:   09/18/24 134 kg (295 lb 6.7 oz)   08/29/24 131.3 kg (289 lb 7.4 oz)   08/22/24 131 kg (288 lb 12.8 oz)       Past Medical & Surgical History:  Past Medical History:   Diagnosis Date    Allergy     Anemia     Anxiety     CHF (congestive heart failure)     Depression     High blood pressure with chronic kidney disease, stage 5 chronic kidney disease or end stage renal disease     Hypertension        Past Surgical History:   Procedure Laterality Date    ABSCESS DRAINAGE Left 9/17/2024    Procedure: DRAINAGE, ABSCESS, GROIN;  Surgeon: Galileo Connors MD;  Location[facility] OR;  Service: General;  Laterality: Left;    COLON RESECTION  9/25/2024    Procedure: COLON RESECTION;  Surgeon: Galileo Connors MD;  Location: [facility] OR;  Service: General;;    FISTULOGRAM Bilateral 4/30/2024    Procedure: Fistulogram;  Surgeon: Khoobehi, Ali, MD;   Location: [facility] CATH/EP LAB;  Service: Vascular;  Laterality: Bilateral;    HERNIA REPAIR Right     With mesh    INSERTION, CATHETER, TUNNELED N/A 6/22/2023    Procedure: Insertion,catheter,tunneled;  Surgeon: Everett Caicedo MD;  Location: [facility] OR;  Service: General;  Laterality: N/A;    LAPAROTOMY, EXPLORATORY N/A 9/25/2024    Procedure: LAPAROTOMY, EXPLORATORY;  Surgeon: Galileo Connors MD;  Location: [facility] OR;  Service: General;  Laterality: N/A;    MOBILIZATION OF SPLENIC FLEXURE  9/25/2024    Procedure: MOBILIZATION, SPLENIC FLEXURE;  Surgeon: Galileo Connors MD;  Location: [facility] OR;  Service: General;;    PERCUTANEOUS TRANSLUMINAL ANGIOPLASTY OF ARTERIOVENOUS FISTULA Left 4/30/2024    Procedure: PTA, AV FISTULA;  Surgeon: Khoobehi, Ali, MD;  Location: [facility] CATH/EP LAB;  Service: Vascular;  Laterality: Left;    PHLEBOGRAPHY N/A 7/19/2024    Procedure: Venogram;  Surgeon: Khoobehi, Ali, MD;  Location: [facility] CATH/EP LAB;  Service: Vascular;  Laterality: N/A;    REMOVAL, TUNNELED CATH Right 7/19/2024    Procedure: REMOVAL, TUNNELED CATH;  Surgeon: Khoobehi, Ali, MD;  Location: [facility] CATH/EP LAB;  Service: Vascular;  Laterality: Right;    ROBOT-ASSISTED LAPAROSCOPIC RESECTION OF SIGMOID COLON USING DA DELANO XI N/A 9/17/2024    Procedure: XI ROBOTIC SIGMOID RESECTION, WITH ANASTAMOSIS;  Surgeon: Galileo Connors MD;  Location: [facility];  Service: General;  Laterality: N/A;  possible open have instruments available       Past Social History:  Social History     Socioeconomic History    Marital status: Single   Tobacco Use    Smoking status: Never     Passive exposure: Never    Smokeless tobacco: Never   Substance and Sexual Activity    Alcohol use: Never    Drug use: Never    Sexual activity: Not Currently   Social History Narrative    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     Social Determinants of Health     Financial Resource Strain: Low Risk  (9/18/2024)    Overall Financial Resource Strain (CARDIA)     Difficulty of Paying  Living Expenses: Not very hard   Food Insecurity: No Food Insecurity (9/18/2024)    Hunger Vital Sign     Worried About Running Out of Food in the Last Year: Never true     Ran Out of Food in the Last Year: Never true   Transportation Needs: No Transportation Needs (9/18/2024)    TRANSPORTATION NEEDS     Transportation : No   Physical Activity: Sufficiently Active (1/3/2023)    Exercise Vital Sign     Days of Exercise per Week: 6 days     Minutes of Exercise per Session: 60 min   Recent Concern: Physical Activity - Insufficiently Active (10/11/2022)    Exercise Vital Sign     Days of Exercise per Week: 3 days     Minutes of Exercise per Session: 30 min   Stress: Stress Concern Present (9/18/2024)    [employer] - Occupational Stress Questionnaire     Feeling of Stress : Very much   Housing Stability: Low Risk  (9/18/2024)    Housing Stability Vital Sign     Unable to Pay for Housing in the Last Year: No     Homeless in the Last Year: No       Medications:  No current facility-administered medications on file prior to encounter.     Current Outpatient Medications on File Prior to Encounter   Medication Sig Dispense Refill    apixaban (ELIQUIS) 2.5 mg Tab Take 2.5 mg by mouth 2 (two) times daily.      calcitRIOL (ROCALTROL) 0.25 MCG Cap Take 1 capsule by mouth once daily (Patient taking differently: Take 0.25 mcg by mouth once daily.) 90 capsule 1    cholecalciferol, vitamin D3, 125 mcg (5,000 unit) Tab Take 1 tablet by mouth once daily.      cloNIDine (CATAPRES) 0.3 MG tablet TAKE 1 TABLET BY MOUTH THREE TIMES DAILY 270 tablet 2    isosorbide mononitrate (IMDUR) 120 MG 24 hr tablet Take 120 mg by mouth once daily.      metoprolol succinate (TOPROL-XL) 50 MG 24 hr tablet Take 150 mg by mouth once daily.      olmesartan (BENICAR) 40 MG tablet Take 1 tablet by mouth once daily 90 tablet 0    sevelamer carbonate (RENVELA) 800 mg Tab Take 2 tablets (1,600 mg total) by mouth 3 (three) times  daily with meals. 180 tablet 11    calcium carbonate (TUMS) 200 mg calcium (500 mg) chewable tablet Take 2 tablets (1,000 mg total) by mouth 3 (three) times daily with meals. (Patient taking differently: Take 1,000 mg by mouth 3 (three) times daily.) 180 tablet 11    hydrALAZINE (APRESOLINE) 100 MG tablet Take 1 tablet (100 mg total) by mouth 3 (three) times daily. 90 tablet 11     Scheduled Meds:   cloNIDine  0.3 mg Oral TID    epoetin mily-epbx  10,000 Units Intravenous Every Mon, Wed, Fri    fluconazole (DIFLUCAN) IV (PEDS and ADULTS)  200 mg Intravenous Q24H    gabapentin  200 mg Oral BID    heparin (porcine)  7,500 Units Subcutaneous Q8H    hydrALAZINE  100 mg Oral Q8H    HYDROmorphone  1 mg Intravenous Once    LIDOcaine-prilocaine   Topical (Top) Once    linezolid  600 mg Intravenous Q12H    losartan  100 mg Oral Daily    metoprolol succinate  150 mg Oral Daily    nitroGLYCERIN 2% TD oint  1 inch Topical (Top) Q6H    piperacillin-tazobactam (Zosyn) IV (PEDS and ADULTS) (extended infusion is not appropriate)  4.5 g Intravenous Q12H     Continuous Infusions:        PRN Meds:.  Current Facility-Administered Medications:     0.9% NaCl, , Intravenous, PRN    acetaminophen, 650 mg, Oral, Q4H PRN    aluminum-magnesium hydroxide-simethicone, 30 mL, Oral, QID PRN    dextrose 10%, 12.5 g, Intravenous, PRN    dextrose 10%, 12.5 g, Intravenous, PRN    dextrose 10%, 12.5 g, Intravenous, PRN    dextrose 10%, 25 g, Intravenous, PRN    dextrose 10%, 25 g, Intravenous, PRN    dextrose 10%, 25 g, Intravenous, PRN    glucagon (human recombinant), 1 mg, Intramuscular, PRN    glucose, 16 g, Oral, PRN    glucose, 24 g, Oral, PRN    heparin (porcine), 4,000 Units, Intravenous, PRN    HYDROmorphone, 2 mg, Intravenous, Q3H PRN    insulin aspart U-100, 0-5 Units, Subcutaneous, QID (AC + HS) PRN    iohexoL, 100 mL, Intravenous, ONCE PRN    magnesium oxide, 800 mg, Oral, PRN    magnesium oxide, 800 mg, Oral, PRN    melatonin, 6 mg, Oral,  "Nightly PRN    naloxone, 0.02 mg, Intravenous, PRN    ondansetron, 4 mg, Intravenous, Q6H PRN    potassium bicarbonate, 35 mEq, Oral, PRN    potassium bicarbonate, 50 mEq, Oral, PRN    potassium bicarbonate, 60 mEq, Oral, PRN    potassium, sodium phosphates, 2 packet, Oral, PRN    potassium, sodium phosphates, 2 packet, Oral, PRN    potassium, sodium phosphates, 2 packet, Oral, PRN    sodium chloride 0.9%, 250 mL, Intravenous, PRN    sodium chloride 0.9%, 3 mL, Intravenous, Q12H PRN    Allergies:  Mushroom    Past Family History:  Reviewed; refer to Hospitalist Admission Note    Review of Systems:  Review of Systems - All 14 systems reviewed and negative, except as noted in HPI    Physical Exam:    /81   Pulse (!) 121   Temp 98.2 °F (36.8 °C) (Oral)   Resp 15   Ht 6' 2" (1.88 m)   Wt 134 kg (295 lb 6.7 oz)   SpO2 99%   BMI 37.93 kg/m²     General Appearance:    Alert, cooperative, no distress, appears stated age   Head:    Normocephalic, without obvious abnormality, atraumatic   Eyes:    PER, conjunctiva/corneas clear, EOM's intact in both eyes        Throat:   Lips, mucosa, and tongue normal; teeth and gums normal   Back:     Symmetric, no curvature, ROM normal, no CVA tenderness   Lungs:     Clear to auscultation bilaterally, respirations unlabored   Chest wall:    No tenderness or deformity   Heart:    Regular rate and rhythm, S1 and S2 normal, no murmur, rub   or gallop   Abdomen:     Soft, non-tender, bowel sounds active all four quadrants,     no masses, no organomegaly   Extremities:   Extremities normal, atraumatic, no cyanosis or edema   Pulses:   2+ and symmetric all extremities   MSK:   No joint or muscle swelling, tenderness or deformity   Skin:   Skin color, texture, turgor normal, no rashes or lesions   Neurologic:   CNII-XII intact, normal strength and sensation       Throughout.  No flap     Results:  Lab Results   Component Value Date     09/27/2024    K 4.7 09/27/2024    CL 97 " 09/27/2024    CO2 24 09/27/2024    BUN 33 (H) 09/27/2024    CREATININE 10.3 (H) 09/27/2024    CALCIUM 9.0 09/27/2024    ANIONGAP 16 09/27/2024       Lab Results   Component Value Date    CALCIUM 9.0 09/27/2024    PHOS 5.2 (H) 09/20/2024     Recent Labs   Lab 09/27/24  0359   WBC 20.31*   RBC 2.66*   HGB 7.9*   HCT 25.3*   *   MCV 95   MCH 29.7   MCHC 31.2*     Mariano Hickey MD    [facility]  [phone]

## 2024-09-27 NOTE — PROGRESS NOTES
[facility] Medicine  Progress Note    Patient Name: João Ham  MRN: [id number]  Patient Class: IP- Inpatient   Admission Date: 9/17/2024  Length of Stay: 10 days  Attending Physician: Tera Pierre MD  Primary Care Provider: Dawson Granado MD        Subjective:     Principal Problem:Inguinal hernia of left side with gangrene        HPI:  Patient is a 44 year old male with history of ESDR on dialysis MWF, awaiting kidney transplant, HTN, presented to ED with 3 days history of left groin pain and swelling. States swelling comes every time he cough and is being painful. Patient has low grade fever 99s at home. He has been vomiting bilious fluid last 2-3 days. No diarrhea or abdominal pain.     In the ED CT abdomen showed Left inguinal hernia containing fat and air as well as marked inflammation extending from inside the pelvis, in the hernia and down into the left scrotum. This is consistent with an infected inguinal hernia, possible gangrene. WBC was 14, patient was tachycardic. General surgery was consulted and patient was admitted under hospitalist.     Overview/Hospital Course:  Patient is a 44 year old male with history of ESRD, was admitted with suspected inguinal hernia gangrene. General surgery was consulted and patient was taken to OR. Found to have colonic perforation due to mesh erosion with an abscess. Patient underwent sigmoid resection and drainage of the abscess, was started on clindamycin, Vancomycin and cefepime. Nephrology was consulted for chronic dialysis. AVF was not functioning, General surgery consulted, trialysis catheter placed. PRBC transfused during dialysis for low Hb 6.6.  While on broad-spectrum coverage, patient is spiking fevers and worsening leukocytosis, id consulted, discontinued clindamycin, vancomycin and cefepime-added daptomycin, Diflucan and Zosyn.  Repeated CT abdomen and pelvis that showed residual abscess/phlegmon and contained perforation.  Re-consulted surgery who mentioned likely post op changes. Fistulogram 9/24 by vascular surgery for declotting the AV fistula. Trialysis removed 9/25. Had worsening white count, fevers upto 103.5 and hypotension overnight 9/25.  Also had hemoglobin of 7, with worsening hypoxia up to 7 L (overnight desaturated to 70% with lethargy requiring BiPAP placement), after which nephrology recommended 1 unit PRBC transfusion.  Id, Nephrology, Urology, General surgery were updated about his worsening condition.We CT repeated that showed intraperitoneal free air and findings suggestive of necrotizing fasciitis, general surgery was consulted stat, patient was transferred to ICU.  The surgeon performed open laparotomy and noted fecal contamination of the left lower quadrant indicative of anastomotic disruption.  Colon was resected, and colostomy was created.      Interval History:  Afebrile.  Post-op soreness is not controlled.  He's currently on the dialysis machine.  Continues to be tachycardic.    Review of Systems   Respiratory:  Negative for cough.    Cardiovascular:  Negative for chest pain.     Objective:     Vital Signs (Most Recent):  Temp: 98.2 °F (36.8 °C) (09/27/24 1215)  Pulse: (!) 123 (09/27/24 1445)  Resp: 18 (09/27/24 1445)  BP: 116/63 (09/27/24 1445)  SpO2: 97 % (09/27/24 1445) Vital Signs (24h Range):  Temp:  [98.2 °F (36.8 °C)-100.5 °F (38.1 °C)] 98.2 °F (36.8 °C)  Pulse:  [] 123  Resp:  [11-33] 18  SpO2:  [92 %-100 %] 97 %  BP: (100-155)/(54-89) 116/63  Arterial Line BP: (109-157)/(54-75) 157/70     Weight: 134 kg (295 lb 6.7 oz)  Body mass index is 37.93 kg/m².    Intake/Output Summary (Last 24 hours) at 9/27/2024 1541  Last data filed at 9/27/2024 1311  Gross per 24 hour   Intake 2056.6 ml   Output 7830 ml   Net -5773.4 ml         Physical Exam  Vitals reviewed.   Constitutional:       General: He is not in acute distress.     Appearance: He is not diaphoretic.   HENT:      Mouth/Throat:      Mouth:  Mucous membranes are moist.   Eyes:      General: No scleral icterus.        Right eye: No discharge.         Left eye: No discharge.   Neck:      Vascular: No JVD.   Cardiovascular:      Rate and Rhythm: Regular rhythm. Tachycardia present.      Comments: R radial art line.  R cephalic vein midline    Pulmonary:      Effort: Pulmonary effort is normal.      Breath sounds: Normal breath sounds.   Abdominal:      General: Bowel sounds are normal. There is distension.      Palpations: Abdomen is soft.      Tenderness: There is abdominal tenderness.      Comments: Midline abd incision.    Incision left groin   Skin:     General: Skin is warm.      Findings: No rash.   Neurological:      Mental Status: He is alert.             Significant Labs: All pertinent labs within the past 24 hours have been reviewed.    Significant Imaging:  No new imaging    Assessment/Plan:      * Inguinal hernia of left side with gangrene  Colonic perforation with abscess  CT abdomen shows Left inguinal hernia containing fat and air as well as marked inflammation extending from inside the pelvis, in the hernia and down into the left scrotum. This is consistent with an infected inguinal hernia, possible gangrene.   Patient was taken to OR and found to have colonic perforation due to mesh erosion. Patient underwent sigmoid resection and I&D.  He's on POD # 10.    Hyperkalemia  Hyperkalemia is likely due to ESRD.The patients most recent potassium results are listed below.  Recent Labs     09/25/24  1737 09/26/24  0501 09/27/24  0359   K 4.4 5.3* 4.7       Plan  - Monitor for arrhythmias with EKG and/or continuous telemetry.   - Treat the hyperkalemia with Potassium Binders and hemodialysis .   - Monitor potassium: Daily  - The patient's hyperkalemia is improving            Sepsis  This patient does have evidence of infective focus  My overall impression is sepsis.  Source: Abdominal  Antibiotics given-   Antibiotics (72h ago, onward)      Start      Stop Route Frequency Ordered    09/27/24 1015  linezolid 600 mg/300 mL IVPB 600 mg         -- IV Every 12 hours (non-standard times) 09/27/24 0909    09/24/24 1800  piperacillin-tazobactam 4.5 g in dextrose 5 % 100 mL IVPB (ready to mix)         -- IV Every 12 hours (non-standard times) 09/24/24 1035            Source control achieved by: IV antibiotics and surgical intervention    Acute hypoxic respiratory failure  Continue supplementing oxygen.  Monitor saturations.  Pulmonology service is consulting.    Perforation of intestine  -patient had open laparotomy with resection of affected colon and creation of colostomy.  He is on post-op day 2.  Continue systemic antibiotics.  General surgeon following up daily.  ID consultant following as well.    Dialysis AV fistula malfunction  Resolved.      Essential hypertension  Monitor blood pressure.  Patient able to take oral antihypertensives again.  He's on hydralazine, losartan, clonidine, and metoprolol.  Adjust doses based on pressure response.    Sleep apnea  CPAP per home settings       Pulmonary hypertension  Chronic.  No need to address acutely.    Severe obesity with body mass index (BMI) of 35.0 to 39.9 with comorbidity  Body mass index is 37.93 kg/m². Morbid obesity complicates all aspects of disease management from diagnostic modalities to treatment. Weight loss encouraged and health benefits explained to patient.         Anemia due to chronic kidney disease, on chronic dialysis  Anemia is likely due to chronic disease due to ESRD. Most recent hemoglobin and hematocrit are listed below.  Recent Labs     09/25/24  1737 09/26/24  0501 09/26/24  0954 09/27/24  0359   HGB 9.5* 8.9* 8.2* 7.9*   HCT 29.4* 27.7*  --  25.3*       Plan  - Monitor serial CBC: Daily  - HGB stable at this time    ESRD (end stage renal disease)  Nephrology service ordering routine dialysis      VTE Risk Mitigation (From admission, onward)           Ordered     heparin (porcine) injection  4,000 Units  As needed (PRN)         09/20/24 1544     heparin (porcine) injection 7,500 Units  Every 8 hours         09/17/24 1031     IP VTE HIGH RISK PATIENT  Once         09/17/24 1031     Place sequential compression device  Until discontinued         09/17/24 1031                    Discharge Planning   MARIA ISABEL:      Code Status: Full Code   Is the patient medically ready for discharge?:     Reason for patient still in hospital (select all that apply): Patient trending condition and Treatment  Discharge Plan A: Rehab   Discharge Delays: None known at this time            Tera Pierre MD  Department of Hospital Medicine   [facility]

## 2024-09-27 NOTE — ASSESSMENT & PLAN NOTE
This patient does have evidence of infective focus  My overall impression is sepsis.  Source: Abdominal  Antibiotics given-   Antibiotics (72h ago, onward)      Start     Stop Route Frequency Ordered    09/24/24 1800  piperacillin-tazobactam 4.5 g in dextrose 5 % 100 mL IVPB (ready to mix)         -- IV Every 12 hours (non-standard times) 09/24/24 1035    09/23/24 1800  DAPTOmycin (CUBICIN) 1,340 mg in 0.9% NaCl SolP 50 mL IVPB         -- IV Every 48 hours (non-standard times) 09/22/24 1758    09/22/24 1800  DAPTOmycin (CUBICIN) 1,340 mg in 0.9% NaCl SolP 50 mL IVPB         -- IV Once 09/22/24 1801          Latest lactate reviewed-  Recent Labs   Lab 09/24/24  1122   LACTATE 0.6       Source control achieved by: IV antibiotics and surgical intervention

## 2024-09-27 NOTE — PLAN OF CARE
Present during IDR.  Therapy recommendations are high intensity for pt and this was discussed.  Pt still with pain that is uncontrolled and meds being adjusted.  Pending ID final recs.  Order placed for SS consult for acute rehab.  Will continue to follow.       09/27/24 0918   Post-Acute Status   Post-Acute Authorization Other   Other Status See Comments  (TBD)   Discharge Delays None known at this time   Discharge Plan   Discharge Plan A Rehab  (TBD)   Discharge Plan B Long-term acute care facility (LTAC)

## 2024-09-27 NOTE — ASSESSMENT & PLAN NOTE
Colonic perforation with abscess  CT abdomen shows Left inguinal hernia containing fat and air as well as marked inflammation extending from inside the pelvis, in the hernia and down into the left scrotum. This is consistent with an infected inguinal hernia, possible gangrene.   Patient was taken to OR and found to have colonic perforation due to mesh erosion. Patient underwent sigmoid resection and I&D on 9/17.

## 2024-09-27 NOTE — ASSESSMENT & PLAN NOTE
Monitor blood pressure while he gets metoprolol IV on a scheduled basis.  Pressures are controlled for the most part.

## 2024-09-27 NOTE — ASSESSMENT & PLAN NOTE
Hyperkalemia is likely due to ESRD.The patients most recent potassium results are listed below.  Recent Labs     09/25/24  1737 09/26/24  0501 09/27/24  0359   K 4.4 5.3* 4.7       Plan  - Monitor for arrhythmias with EKG and/or continuous telemetry.   - Treat the hyperkalemia with Potassium Binders and hemodialysis .   - Monitor potassium: Daily  - The patient's hyperkalemia is improving

## 2024-09-27 NOTE — NURSING
Faxed starter kit request to [facility] for ostomy samples and further education to be delivered to patients home. Ostomy and wound care continue to follow this patient.

## 2024-09-27 NOTE — ASSESSMENT & PLAN NOTE
Hyperkalemia is likely due to ESRD.The patients most recent potassium results are listed below.  Recent Labs     09/25/24  0236 09/25/24  1737 09/26/24  0501   K 4.0 4.4 5.3*     Plan  - Monitor for arrhythmias with EKG and/or continuous telemetry.   - Treat the hyperkalemia with Potassium Binders and hemodialysis .   - Monitor potassium: Daily  - The patient's hyperkalemia is stable

## 2024-09-27 NOTE — ASSESSMENT & PLAN NOTE
Colonic perforation with abscess  CT abdomen shows Left inguinal hernia containing fat and air as well as marked inflammation extending from inside the pelvis, in the hernia and down into the left scrotum. This is consistent with an infected inguinal hernia, possible gangrene.   Patient was taken to OR and found to have colonic perforation due to mesh erosion. Patient underwent sigmoid resection and I&D.  He's on POD # 10.

## 2024-09-28 LAB
ACID FAST MOD KINY STN SPEC: NORMAL
ALBUMIN SERPL BCP-MCNC: 3.2 G/DL (ref 3.5–5.2)
ALP SERPL-CCNC: 133 U/L (ref 55–135)
ALT SERPL W/O P-5'-P-CCNC: 33 U/L (ref 10–44)
ANION GAP SERPL CALC-SCNC: 17 MMOL/L (ref 8–16)
AST SERPL-CCNC: 54 U/L (ref 10–40)
BACTERIA CATH TIP CULT: NO GROWTH
BACTERIA SPEC AEROBE CULT: ABNORMAL
BACTERIA SPEC AEROBE CULT: ABNORMAL
BASOPHILS NFR BLD: 0 % (ref 0–1.9)
BILIRUB SERPL-MCNC: 0.5 MG/DL (ref 0.1–1)
BUN SERPL-MCNC: 37 MG/DL (ref 6–20)
CALCIUM SERPL-MCNC: 9.7 MG/DL (ref 8.7–10.5)
CHLORIDE SERPL-SCNC: 97 MMOL/L (ref 95–110)
CO2 SERPL-SCNC: 23 MMOL/L (ref 23–29)
CREAT SERPL-MCNC: 10.1 MG/DL (ref 0.5–1.4)
DIFFERENTIAL METHOD BLD: ABNORMAL
EOSINOPHIL NFR BLD: 0 % (ref 0–8)
ERYTHROCYTE [DISTWIDTH] IN BLOOD BY AUTOMATED COUNT: 22.5 % (ref 11.5–14.5)
EST. GFR  (NO RACE VARIABLE): 6 ML/MIN/1.73 M^2
GIANT PLATELETS BLD QL SMEAR: PRESENT
GLUCOSE SERPL-MCNC: 112 MG/DL (ref 70–110)
HCT VFR BLD AUTO: 27.7 % (ref 40–54)
HGB BLD-MCNC: 8.5 G/DL (ref 14–18)
HYPOCHROMIA BLD QL SMEAR: ABNORMAL
IMM GRANULOCYTES # BLD AUTO: ABNORMAL K/UL (ref 0–0.04)
IMM GRANULOCYTES NFR BLD AUTO: ABNORMAL % (ref 0–0.5)
LYMPHOCYTES NFR BLD: 1 % (ref 18–48)
MAGNESIUM SERPL-MCNC: 2.7 MG/DL (ref 1.6–2.6)
MCH RBC QN AUTO: 29.6 PG (ref 27–31)
MCHC RBC AUTO-ENTMCNC: 30.7 G/DL (ref 32–36)
MCV RBC AUTO: 97 FL (ref 82–98)
MONOCYTES NFR BLD: 5 % (ref 4–15)
NEUTROPHILS NFR BLD: 76 % (ref 38–73)
NEUTS BAND NFR BLD MANUAL: 18 %
NRBC BLD-RTO: 0 /100 WBC
PLATELET # BLD AUTO: 560 K/UL (ref 150–450)
PLATELET BLD QL SMEAR: ABNORMAL
PMV BLD AUTO: 10.2 FL (ref 9.2–12.9)
POCT GLUCOSE: 122 MG/DL (ref 70–110)
POCT GLUCOSE: 124 MG/DL (ref 70–110)
POCT GLUCOSE: 127 MG/DL (ref 70–110)
POCT GLUCOSE: 160 MG/DL (ref 70–110)
POLYCHROMASIA BLD QL SMEAR: ABNORMAL
POTASSIUM SERPL-SCNC: 4.6 MMOL/L (ref 3.5–5.1)
PROT SERPL-MCNC: 8.3 G/DL (ref 6–8.4)
RBC # BLD AUTO: 2.87 M/UL (ref 4.6–6.2)
SODIUM SERPL-SCNC: 137 MMOL/L (ref 136–145)
SPHEROCYTES BLD QL SMEAR: ABNORMAL
TARGETS BLD QL SMEAR: ABNORMAL
WBC # BLD AUTO: 24.45 K/UL (ref 3.9–12.7)

## 2024-09-28 PROCEDURE — 94760 N-INVAS EAR/PLS OXIMETRY 1: CPT

## 2024-09-28 PROCEDURE — 63600175 PHARM REV CODE 636 W HCPCS: Performed by: SURGERY

## 2024-09-28 PROCEDURE — 94660 CPAP INITIATION&MGMT: CPT

## 2024-09-28 PROCEDURE — 85007 BL SMEAR W/DIFF WBC COUNT: CPT | Performed by: SURGERY

## 2024-09-28 PROCEDURE — 25000003 PHARM REV CODE 250: Performed by: INTERNAL MEDICINE

## 2024-09-28 PROCEDURE — 99900035 HC TECH TIME PER 15 MIN (STAT)

## 2024-09-28 PROCEDURE — 97110 THERAPEUTIC EXERCISES: CPT | Mod: CQ

## 2024-09-28 PROCEDURE — 36415 COLL VENOUS BLD VENIPUNCTURE: CPT | Performed by: SURGERY

## 2024-09-28 PROCEDURE — 25000003 PHARM REV CODE 250: Performed by: SURGERY

## 2024-09-28 PROCEDURE — 12000002 HC ACUTE/MED SURGE SEMI-PRIVATE ROOM

## 2024-09-28 PROCEDURE — 94761 N-INVAS EAR/PLS OXIMETRY MLT: CPT

## 2024-09-28 PROCEDURE — 27000221 HC OXYGEN, UP TO 24 HOURS

## 2024-09-28 PROCEDURE — 83735 ASSAY OF MAGNESIUM: CPT | Performed by: SURGERY

## 2024-09-28 PROCEDURE — 80053 COMPREHEN METABOLIC PANEL: CPT | Performed by: SURGERY

## 2024-09-28 PROCEDURE — 99900031 HC PATIENT EDUCATION (STAT)

## 2024-09-28 PROCEDURE — 85027 COMPLETE CBC AUTOMATED: CPT | Performed by: SURGERY

## 2024-09-28 PROCEDURE — 99232 SBSQ HOSP IP/OBS MODERATE 35: CPT | Mod: ,,, | Performed by: INTERNAL MEDICINE

## 2024-09-28 PROCEDURE — 63600175 PHARM REV CODE 636 W HCPCS: Performed by: INTERNAL MEDICINE

## 2024-09-28 PROCEDURE — 94799 UNLISTED PULMONARY SVC/PX: CPT

## 2024-09-28 RX ADMIN — HEPARIN SODIUM 7500 UNITS: 5000 INJECTION INTRAVENOUS; SUBCUTANEOUS at 09:09

## 2024-09-28 RX ADMIN — HEPARIN SODIUM 7500 UNITS: 5000 INJECTION INTRAVENOUS; SUBCUTANEOUS at 03:09

## 2024-09-28 RX ADMIN — NITROGLYCERIN 1 INCH: 20 OINTMENT TOPICAL at 11:09

## 2024-09-28 RX ADMIN — METOPROLOL SUCCINATE 150 MG: 50 TABLET, FILM COATED, EXTENDED RELEASE ORAL at 08:09

## 2024-09-28 RX ADMIN — FLUCONAZOLE 200 MG: 2 INJECTION, SOLUTION INTRAVENOUS at 10:09

## 2024-09-28 RX ADMIN — NITROGLYCERIN 1 INCH: 20 OINTMENT TOPICAL at 06:09

## 2024-09-28 RX ADMIN — OXYCODONE HYDROCHLORIDE AND ACETAMINOPHEN 1 TABLET: 5; 325 TABLET ORAL at 11:09

## 2024-09-28 RX ADMIN — CLONIDINE HYDROCHLORIDE 0.3 MG: 0.1 TABLET ORAL at 05:09

## 2024-09-28 RX ADMIN — HYDRALAZINE HYDROCHLORIDE 100 MG: 25 TABLET ORAL at 03:09

## 2024-09-28 RX ADMIN — CLONIDINE HYDROCHLORIDE 0.3 MG: 0.1 TABLET ORAL at 08:09

## 2024-09-28 RX ADMIN — GABAPENTIN 200 MG: 100 CAPSULE ORAL at 09:09

## 2024-09-28 RX ADMIN — PIPERACILLIN SODIUM AND TAZOBACTAM SODIUM 4.5 G: 4; .5 INJECTION, POWDER, LYOPHILIZED, FOR SOLUTION INTRAVENOUS at 12:09

## 2024-09-28 RX ADMIN — ONDANSETRON 4 MG: 2 INJECTION INTRAMUSCULAR; INTRAVENOUS at 04:09

## 2024-09-28 RX ADMIN — LINEZOLID 600 MG: 600 INJECTION, SOLUTION INTRAVENOUS at 10:09

## 2024-09-28 RX ADMIN — LINEZOLID 600 MG: 600 INJECTION, SOLUTION INTRAVENOUS at 09:09

## 2024-09-28 RX ADMIN — GABAPENTIN 200 MG: 100 CAPSULE ORAL at 08:09

## 2024-09-28 RX ADMIN — LOSARTAN POTASSIUM 100 MG: 50 TABLET, FILM COATED ORAL at 08:09

## 2024-09-28 RX ADMIN — PIPERACILLIN SODIUM AND TAZOBACTAM SODIUM 4.5 G: 4; .5 INJECTION, POWDER, LYOPHILIZED, FOR SOLUTION INTRAVENOUS at 11:09

## 2024-09-28 RX ADMIN — HEPARIN SODIUM 5000 UNITS: 5000 INJECTION INTRAVENOUS; SUBCUTANEOUS at 04:09

## 2024-09-28 NOTE — PROGRESS NOTES
General Surgery Progress Note    Admit Date: 9/17/2024  S/P: Procedure(s) (LRB):  LAPAROTOMY, EXPLORATORY (N/A)  COLON RESECTION  MOBILIZATION, SPLENIC FLEXURE    Post-operative Day: 3 Days Post-Op    Hospital Day: 12    SUBJECTIVE:   Status post re-exploration abdomen with Aidan's procedure.    Seen this morning. Complaining of poor appetite and mild nausea. Prefer to go back to clear liquids.  It was having ostomy output. Coughing mucus which it was making abdominal pain worse.    OBJECTIVE:     Vital Signs (Most Recent)  Temp:  [98 °F (36.7 °C)-99 °F (37.2 °C)] 98 °F (36.7 °C)  Pulse:  [100-132] 102  Resp:  [11-26] 18  SpO2:  [92 %-100 %] 96 %  BP: ()/(50-89) 127/74    I&Os:  I/O last 3 completed shifts:  In: 2028.2 [P.O.:500; I.V.:78.5; Other:500; IV Piggyback:949.8]  Out: 5030 [Emesis/NG output:500; Drains:20; Other:4300; Stool:210]    Physical Exam:  Gen: NAD, AAOx3  HEENT: Anicteric sclera  Pulm: unlabored, symmetrical   Abd: Soft, appropriate tenderness palpation, BERNABE drain in place through serosanguineous output, end ostomy placed, pink and viable with stool in bag, midline bandage intact with minimal soilage    Laboratory:  CBC:   Recent Labs   Lab 09/28/24  0403   WBC 24.45*   RBC 2.87*   HGB 8.5*   HCT 27.7*   *   MCV 97   MCH 29.6   MCHC 30.7*     CMP:   Recent Labs   Lab 09/28/24  0403   *   CALCIUM 9.7   ALBUMIN 3.2*   PROT 8.3      K 4.6   CO2 23   CL 97   BUN 37*   CREATININE 10.1*   ALKPHOS 133   ALT 33   AST 54*   BILITOT 0.5     Labs within the past 24 hours have been reviewed.    ASSESSMENT/PLAN:     Patient Active Problem List    Diagnosis Date Noted    Hyperkalemia 09/26/2024    Perforation of intestine 09/25/2024    Acute hypoxic respiratory failure 09/25/2024    Sepsis 09/25/2024    Dialysis AV fistula malfunction 09/19/2024    Inguinal hernia of left side with gangrene 09/17/2024    Patient on waiting list for kidney transplant 08/22/2024    Essential  hypertension 07/17/2024    Bacteremia 07/15/2024    Sleep apnea 03/31/2023    Hyperparathyroidism 01/10/2023    Erectile dysfunction 01/10/2023    Pulmonary hypertension 11/29/2022    Hypertensive heart and renal disease with congestive heart failure 11/29/2022    Severe obesity with body mass index (BMI) of 35.0 to 39.9 with comorbidity 10/17/2022    ESRD (end stage renal disease) 10/10/2022    Anemia due to chronic kidney disease, on chronic dialysis 10/10/2022    Persistent proteinuria 10/10/2022         44 y.o. male it was status post ex lap with end ostomy  -starting to have bowel function but feeling somewhat nauseated, will go back to clear liquids per patient request, may be advanced to full liquids then to low residue patient desires as long as he continues to have ostomy output   -continue IV fluids for now   -continue antibiotics and antifungals  -continue current pain regimen  -encourage out of bed to chair and ambulation, incentive spirometer use  -HD per Nephrology

## 2024-09-28 NOTE — PROGRESS NOTES
INPATIENT NEPHROLOGY Progress  [facility] NEPHROLOGY    João Ham  09/28/2024    Reason for consultation:  ESRD    Chief Complaint:   Chief Complaint   Patient presents with    Fatigue    Groin Swelling     X 2 days.  Hx of Kidney failure and CHF     History of Present Illness:  Per H and P    Patient is a 44 year old male with history of ESDR on dialysis MWF, awaiting kidney transplant, HTN, presented to ED with 3 days history of left groin pain and swelling. States swelling comes every time he cough and is being painful. Patient has low grade fever 99s at home. He has been vomiting bilious fluid last 2-3 days. No diarrhea or abdominal pain.      In the ED CT abdomen showed Left inguinal hernia containing fat and air as well as marked inflammation extending from inside the pelvis, in the hernia and down into the left scrotum. This is consistent with an infected inguinal hernia, possible gangrene. WBC was 14, patient was tachycardic. General surgery was consulted and patient was admitted under hospitalist.     9/19  avf nonfunctional.   No sob or chest pain.  Has post op pain.  AFVSS  9/20  afvss.  Pt doesn't want to see previous vascular surgeon who put his avf in.  No alternative available.  Transfer center called.  Has temporary trialysis catheter.  Patient seen on hemodialysis for uremic clearance and ultrafiltration.    9/21  2.5L off w/HD yesterday.  Tmax 101.8, pressures ok.  Lab results reviewed, Hgb low but better than yesterday.  C/o post op pain, no other complaints.  9/22 POD 5.  VSS, lab results reviewed.  Hgb 7.6, added epo to HD orders.  C/o gas pains, plans to move around more today.  Next HD tomorrow.  9/23 VSS. HD today. Transfuse with next HD as needed if Hgb stil low.  9/24 VSS. Appreciate input from Urology, Gen Surgery, Vascular Surgery, ID on this complex patient. Plan for fistulogram tomorrow. CXR yesterday shows trialysis line tip in appropriate cocation. Will attempt HD today since we  skipped yesterday due to nurse and patient concerns that the line may be malpositioned.  9/25 VSS. s/p cephalic vein dilation by Dr. Robledo yesterday, tolerated HD very well. Hold HD today. Hypotension this AM, low grade fever yesterday, WBC elevated, received IVF bolus and Midodrine. Continues to be infected per Surgery, we can remove trialysis line now that AVF is treated and usable... Consider ICU. Stopped hydralazine and olmesartan, decreased Clonidine to 0.1 BID, not sure what to do with Metoprolol 150mg and Hydralazine 120mg... He may be sob/hypoxic due to anemia, suggest 1 PRBC instead of IVF, since Hgb is 7.    Addendum @ 10:47 AM  Seen at bedside in ICU with Dr. Rodriguez and Dr. Au. Mother present as well. Reviewed imaging. Agree with plan for urgent ex lap, remove central line and place mid line, keep current abx, hold on transfusion and dialysis and reassess later. ABG and fresh set of cx now, re-evalaute later.    Addendum @ 4:36 PM  Discussed briefly with Dr. Connors, dialysis nurse Brant and with ICU nurse. Patient had colostomy placed due to anastomotic leak and had 2 PRBC given. Upon return to floor was hypertensive with SBP > 200 and NPO. Advised Cleviprex drip for now (earlier to day he was on max dose Clonidine, mad dose Hydralazine, max dose Olmesartan, 150mg Metoprolol-XL and 120mg of Imdur - none of which he can get due to NPO) and will do urgent HD with UF 2L or so as tolerated.    9/26  POD 1 s/p . Exploratory laparotomy,. Colon resection end-colostomy, Mobilization of splenic flexure, and left inguinal canal exploration.  Post pain. No sob.  Sleepy.    9/27  AFVSS.  Some post op pain.  No sob.  No angina.   Patient seen on hemodialysis for uremic clearance and ultrafiltration.      9/28 s/p 3.8 liter uf yesterday.     AFVSS.  Post op pain.  Dry cough.  No sob.  Projectile vomiting earlier today per patient      Plan of Care:    ESRD on HD MWF  --continue dialysis per routine  --renal dose  medication per routine  --supplemental dialysis done yesterday with 3 liter UF    Anemia of CKD  --erythropoiesis stimulating agent with renal replacement therapy  --transfuse 1 PRBC due to hypoxic respiratory failure    Secondary HPT  --renal diet  --continue binders with meals    Hypertension  Hypotension with low grade fevers ? Sepsis--better  --uf with hd as needed  --continue sepsis therapy  --line can be remove  --gave blood, careful with IVF    Hyperkalemia  --2k dialysate  --low k diet  --better today    AVF malfunction fixed  --got temporary line, CXR on 9/23 confirms tip in typical location, unchanged.  --appreciate Vascular eval, s/p fistulogram and cephalic vein stenosis dilation on 9/23 by Dr. Polk.    Nausea/vomiting  --add phenergan as back up to zofran      Thank you for allowing us to participate in this patient's care. We will continue to follow.    Vital Signs:  Temp Readings from Last 3 Encounters:   09/28/24 98 °F (36.7 °C) (Oral)   08/22/24 97.3 °F (36.3 °C) (Temporal)   07/30/24 98.6 °F (37 °C) (Oral)       Pulse Readings from Last 3 Encounters:   09/28/24 102   08/29/24 (!) 115   08/22/24 110       BP Readings from Last 3 Encounters:   09/28/24 127/74   08/29/24 99/68   08/22/24 (!) 137/91       Weight:  Wt Readings from Last 3 Encounters:   09/18/24 134 kg (295 lb 6.7 oz)   08/29/24 131.3 kg (289 lb 7.4 oz)   08/22/24 131 kg (288 lb 12.8 oz)       Past Medical & Surgical History:  Past Medical History:   Diagnosis Date    Allergy     Anemia     Anxiety     CHF (congestive heart failure)     Depression     High blood pressure with chronic kidney disease, stage 5 chronic kidney disease or end stage renal disease     Hypertension        Past Surgical History:   Procedure Laterality Date    ABSCESS DRAINAGE Left 9/17/2024    Procedure: DRAINAGE, ABSCESS, GROIN;  Surgeon: Galileo Connors MD;  Location: [facility];  Service: General;  Laterality: Left;    COLON RESECTION  9/25/2024    Procedure:  COLON RESECTION;  Surgeon: Galileo Connors MD;  Location: [facility] OR;  Service: General;;    FISTULOGRAM Bilateral 4/30/2024    Procedure: Fistulogram;  Surgeon: Khoobehi, Ali, MD;  Location: [facility] CATH/EP LAB;  Service: Vascular;  Laterality: Bilateral;    HERNIA REPAIR Right     With mesh    INSERTION, CATHETER, TUNNELED N/A 6/22/2023    Procedure: Insertion,catheter,tunneled;  Surgeon: Everett Caicedo MD;  Location: [facility] OR;  Service: General;  Laterality: N/A;    LAPAROTOMY, EXPLORATORY N/A 9/25/2024    Procedure: LAPAROTOMY, EXPLORATORY;  Surgeon: Galileo Connors MD;  Location: [facility] OR;  Service: General;  Laterality: N/A;    MOBILIZATION OF SPLENIC FLEXURE  9/25/2024    Procedure: MOBILIZATION, SPLENIC FLEXURE;  Surgeon: Galileo Connors MD;  Location: [facility] OR;  Service: General;;    PERCUTANEOUS TRANSLUMINAL ANGIOPLASTY OF ARTERIOVENOUS FISTULA Left 4/30/2024    Procedure: PTA, AV FISTULA;  Surgeon: Khoobehi, Ali, MD;  Location: [facility] CATH/EP LAB;  Service: Vascular;  Laterality: Left;    PHLEBOGRAPHY N/A 7/19/2024    Procedure: Venogram;  Surgeon: Khoobehi, Ali, MD;  Location: [facility] CATH/EP LAB;  Service: Vascular;  Laterality: N/A;    REMOVAL, TUNNELED CATH Right 7/19/2024    Procedure: REMOVAL, TUNNELED CATH;  Surgeon: Khoobehi, Ali, MD;  Location: [facility] CATH/EP LAB;  Service: Vascular;  Laterality: Right;    ROBOT-ASSISTED LAPAROSCOPIC RESECTION OF SIGMOID COLON USING DA DELANO XI N/A 9/17/2024    Procedure: XI ROBOTIC SIGMOID RESECTION, WITH ANASTAMOSIS;  Surgeon: Galileo Connors MD;  Location: [facility];  Service: General;  Laterality: N/A;  possible open have instruments available       Past Social History:  Social History     Socioeconomic History    Marital status: Single   Tobacco Use    Smoking status: Never     Passive exposure: Never    Smokeless tobacco: Never   Substance and Sexual Activity    Alcohol use: Never    Drug use: Never    Sexual activity: Not Currently   Social History Narrative     Caregiver Luis Carlos Romo     Social Drivers of Health     Financial Resource Strain: Low Risk  (9/18/2024)    Overall Financial Resource Strain (CARDIA)     Difficulty of Paying Living Expenses: Not very hard   Food Insecurity: No Food Insecurity (9/18/2024)    Hunger Vital Sign     Worried About Running Out of Food in the Last Year: Never true     Ran Out of Food in the Last Year: Never true   Transportation Needs: No Transportation Needs (9/18/2024)    TRANSPORTATION NEEDS     Transportation : No   Physical Activity: Sufficiently Active (1/3/2023)    Exercise Vital Sign     Days of Exercise per Week: 6 days     Minutes of Exercise per Session: 60 min   Recent Concern: Physical Activity - Insufficiently Active (10/11/2022)    Exercise Vital Sign     Days of Exercise per Week: 3 days     Minutes of Exercise per Session: 30 min   Stress: Stress Concern Present (9/18/2024)    [employer] - Occupational Stress Questionnaire     Feeling of Stress : Very much   Housing Stability: Low Risk  (9/18/2024)    Housing Stability Vital Sign     Unable to Pay for Housing in the Last Year: No     Homeless in the Last Year: No       Medications:  No current facility-administered medications on file prior to encounter.     Current Outpatient Medications on File Prior to Encounter   Medication Sig Dispense Refill    apixaban (ELIQUIS) 2.5 mg Tab Take 2.5 mg by mouth 2 (two) times daily.      calcitRIOL (ROCALTROL) 0.25 MCG Cap Take 1 capsule by mouth once daily (Patient taking differently: Take 0.25 mcg by mouth once daily.) 90 capsule 1    cholecalciferol, vitamin D3, 125 mcg (5,000 unit) Tab Take 1 tablet by mouth once daily.      cloNIDine (CATAPRES) 0.3 MG tablet TAKE 1 TABLET BY MOUTH THREE TIMES DAILY 270 tablet 2    isosorbide mononitrate (IMDUR) 120 MG 24 hr tablet Take 120 mg by mouth once daily.      metoprolol succinate (TOPROL-XL) 50 MG 24 hr tablet Take 150 mg by mouth once daily.       olmesartan (BENICAR) 40 MG tablet Take 1 tablet by mouth once daily 90 tablet 0    sevelamer carbonate (RENVELA) 800 mg Tab Take 2 tablets (1,600 mg total) by mouth 3 (three) times daily with meals. 180 tablet 11    calcium carbonate (TUMS) 200 mg calcium (500 mg) chewable tablet Take 2 tablets (1,000 mg total) by mouth 3 (three) times daily with meals. (Patient taking differently: Take 1,000 mg by mouth 3 (three) times daily.) 180 tablet 11    hydrALAZINE (APRESOLINE) 100 MG tablet Take 1 tablet (100 mg total) by mouth 3 (three) times daily. 90 tablet 11     Scheduled Meds:   cloNIDine  0.3 mg Oral TID    epoetin mily-epbx  10,000 Units Intravenous Every Mon, Wed, Fri    fluconazole (DIFLUCAN) IV (PEDS and ADULTS)  200 mg Intravenous Q24H    gabapentin  200 mg Oral BID    heparin (porcine)  7,500 Units Subcutaneous Q8H    hydrALAZINE  100 mg Oral Q8H    HYDROmorphone  1 mg Intravenous Once    LIDOcaine-prilocaine   Topical (Top) Once    linezolid  600 mg Intravenous Q12H    losartan  100 mg Oral Daily    metoprolol succinate  150 mg Oral Daily    nitroGLYCERIN 2% TD oint  1 inch Topical (Top) Q6H    piperacillin-tazobactam (Zosyn) IV (PEDS and ADULTS) (extended infusion is not appropriate)  4.5 g Intravenous Q12H     Continuous Infusions:        PRN Meds:.  Current Facility-Administered Medications:     0.9% NaCl, , Intravenous, PRN    acetaminophen, 650 mg, Oral, Q4H PRN    aluminum-magnesium hydroxide-simethicone, 30 mL, Oral, QID PRN    dextrose 10%, 12.5 g, Intravenous, PRN    dextrose 10%, 12.5 g, Intravenous, PRN    dextrose 10%, 12.5 g, Intravenous, PRN    dextrose 10%, 25 g, Intravenous, PRN    dextrose 10%, 25 g, Intravenous, PRN    dextrose 10%, 25 g, Intravenous, PRN    glucagon (human recombinant), 1 mg, Intramuscular, PRN    glucose, 16 g, Oral, PRN    glucose, 24 g, Oral, PRN    heparin (porcine), 4,000 Units, Intravenous, PRN    HYDROmorphone, 2 mg, Intravenous, Q3H PRN    insulin aspart U-100, 0-5  "Units, Subcutaneous, QID (AC + HS) PRN    iohexoL, 100 mL, Intravenous, ONCE PRN    magnesium oxide, 800 mg, Oral, PRN    magnesium oxide, 800 mg, Oral, PRN    melatonin, 6 mg, Oral, Nightly PRN    naloxone, 0.02 mg, Intravenous, PRN    ondansetron, 4 mg, Intravenous, Q6H PRN    oxyCODONE-acetaminophen, 1 tablet, Oral, Q4H PRN    potassium bicarbonate, 35 mEq, Oral, PRN    potassium bicarbonate, 50 mEq, Oral, PRN    potassium bicarbonate, 60 mEq, Oral, PRN    potassium, sodium phosphates, 2 packet, Oral, PRN    potassium, sodium phosphates, 2 packet, Oral, PRN    potassium, sodium phosphates, 2 packet, Oral, PRN    sodium chloride 0.9%, 250 mL, Intravenous, PRN    sodium chloride 0.9%, 3 mL, Intravenous, Q12H PRN    Allergies:  Mushroom    Past Family History:  Reviewed; refer to Hospitalist Admission Note    Review of Systems:  Review of Systems - All 14 systems reviewed and negative, except as noted in HPI    Physical Exam:    /74   Pulse 102   Temp 98 °F (36.7 °C) (Oral)   Resp 18   Ht 6' 2" (1.88 m)   Wt 134 kg (295 lb 6.7 oz)   SpO2 96%   BMI 37.93 kg/m²     General Appearance:    Alert, cooperative, no distress, appears stated age   Head:    Normocephalic, without obvious abnormality, atraumatic   Eyes:    PER, conjunctiva/corneas clear, EOM's intact in both eyes        Throat:   Lips, mucosa, and tongue normal; teeth and gums normal   Back:     Symmetric, no curvature, ROM normal, no CVA tenderness   Lungs:     Clear to auscultation bilaterally, respirations unlabored   Chest wall:    No tenderness or deformity   Heart:    Regular rate and rhythm, S1 and S2 normal, no murmur, rub   or gallop   Abdomen:     Soft, non-tender, bowel sounds active all four quadrants,     no masses, no organomegaly   Extremities:   Extremities normal, atraumatic, no cyanosis or edema   Pulses:   2+ and symmetric all extremities   MSK:   No joint or muscle swelling, tenderness or deformity   Skin:   Skin color, " texture, turgor normal, no rashes or lesions   Neurologic:   CNII-XII intact, normal strength and sensation       Throughout.  No flap     Results:  Lab Results   Component Value Date     09/28/2024    K 4.6 09/28/2024    CL 97 09/28/2024    CO2 23 09/28/2024    BUN 37 (H) 09/28/2024    CREATININE 10.1 (H) 09/28/2024    CALCIUM 9.7 09/28/2024    ANIONGAP 17 (H) 09/28/2024       Lab Results   Component Value Date    CALCIUM 9.7 09/28/2024    PHOS 5.2 (H) 09/20/2024     Recent Labs   Lab 09/28/24  0403   WBC 24.45*   RBC 2.87*   HGB 8.5*   HCT 27.7*   *   MCV 97   MCH 29.6   MCHC 30.7*     Mariano Hickey MD    [facility] [place]  [phone]

## 2024-09-28 NOTE — NURSING
Pt arrived to room 1203 from ICU. Wound pics taken, pt on waffle mattress, v/s taken pt in no distress at this time. Call light within reach, pt v/u to call for concerns or needs.

## 2024-09-28 NOTE — NURSING
Notified MD on call that pt had a exp lap with colon resection on 9/25. Pt has output in his colostomy and just vomited 500cc dark brown emesis. Pt states he has been belching and he feels better after vomiting.

## 2024-09-28 NOTE — SUBJECTIVE & OBJECTIVE
Interval History:  See hospital course    Review of Systems   Constitutional:  Positive for fatigue.   Respiratory:  Positive for cough. Negative for chest tightness, shortness of breath and wheezing.    Cardiovascular:  Negative for chest pain.   Gastrointestinal:  Positive for abdominal pain and nausea.     Objective:     Vital Signs (Most Recent):  Temp: 98.2 °F (36.8 °C) (09/28/24 1152)  Pulse: 108 (09/28/24 1152)  Resp: 16 (09/28/24 1152)  BP: 127/82 (09/28/24 1152)  SpO2: 96 % (09/28/24 1152) Vital Signs (24h Range):  Temp:  [98 °F (36.7 °C)-99 °F (37.2 °C)] 98.2 °F (36.8 °C)  Pulse:  [100-132] 108  Resp:  [15-26] 16  SpO2:  [92 %-100 %] 96 %  BP: ()/(50-82) 127/82     Weight: 134 kg (295 lb 6.7 oz)  Body mass index is 37.93 kg/m².    Intake/Output Summary (Last 24 hours) at 9/28/2024 1254  Last data filed at 9/28/2024 1043  Gross per 24 hour   Intake 859.75 ml   Output 725 ml   Net 134.75 ml         Physical Exam  Vitals reviewed.   Constitutional:       General: He is not in acute distress.     Appearance: He is not diaphoretic.   HENT:      Mouth/Throat:      Mouth: Mucous membranes are moist.   Eyes:      General: No scleral icterus.        Right eye: No discharge.         Left eye: No discharge.   Neck:      Vascular: No JVD.   Cardiovascular:      Rate and Rhythm: Regular rhythm. Tachycardia present.      Comments: R radial art line.  R cephalic vein midline    Pulmonary:      Effort: Pulmonary effort is normal.      Breath sounds: Normal breath sounds.   Abdominal:      General: Bowel sounds are normal. There is distension.      Palpations: Abdomen is soft.      Tenderness: There is abdominal tenderness.      Comments: Midline abd incision.    Incision left groin   Skin:     General: Skin is warm.      Findings: No rash.   Neurological:      Mental Status: He is alert.             Significant Labs: All pertinent labs within the past 24 hours have been reviewed.    Significant Imaging: I have  reviewed all pertinent imaging results/findings within the past 24 hours.

## 2024-09-28 NOTE — CARE UPDATE
09/28/24 0800   Patient Assessment/Suction   Level of Consciousness (AVPU) alert   Respiratory Effort Normal;Unlabored   Expansion/Accessory Muscles/Retractions no use of accessory muscles;no retractions;expansion symmetric   All Lung Fields Breath Sounds clear;diminished   Rhythm/Pattern, Respiratory unlabored   Skin Integrity   $ Wound Care Tech Time 15 min   Area Observed Bridge of nose   Skin Appearance without discoloration   PRE-TX-O2   Device (Oxygen Therapy) nasal cannula   $ Is the patient on Low Flow Oxygen? Yes   Flow (L/min) (Oxygen Therapy) 2   SpO2 95 %   Pulse Oximetry Type Intermittent   $ Pulse Oximetry - Multiple Charge Pulse Oximetry - Multiple   Pulse 100   Resp 18   Aerosol Therapy   $ Aerosol Therapy Charges PRN treatment not required   Incentive Spirometer   $ Incentive Spirometer Charges done with encouragement   Incentive Spirometer Predicted Level (mL) 2000   Administration (IS) instruction provided, follow-up;refused   Number of Repetitions (IS) 10   Level Incentive Spirometer (mL) 300   Patient Tolerance (IS) good   Education   $ Education 15 min;Incentive Spirometry;Oxygen

## 2024-09-28 NOTE — ASSESSMENT & PLAN NOTE
Hyperkalemia is likely due to ESRD.The patients most recent potassium results are listed below.  Recent Labs     09/26/24  0501 09/27/24  0359 09/28/24  0403   K 5.3* 4.7 4.6     Plan  - Monitor for arrhythmias with EKG and/or continuous telemetry.   - Treat the hyperkalemia with Potassium Binders and hemodialysis .   - Monitor potassium: Daily  - The patient's hyperkalemia is improving

## 2024-09-28 NOTE — PROGRESS NOTES
[facility]  Progress Note    Patient Name: João Ham  MRN: [id number]  Patient Class: IP- Inpatient   Admission Date: 9/17/2024  Length of Stay: 11 days  Attending Physician: Marichuy Crum DO  Primary Care Provider: Dawson Granado MD        Subjective:     Principal Problem:Inguinal hernia of left side with gangrene        HPI:  Patient is a 44 year old male with history of ESDR on dialysis MWF, awaiting kidney transplant, HTN, presented to ED with 3 days history of left groin pain and swelling. States swelling comes every time he cough and is being painful. Patient has low grade fever 99s at home. He has been vomiting bilious fluid last 2-3 days. No diarrhea or abdominal pain.     In the ED CT abdomen showed Left inguinal hernia containing fat and air as well as marked inflammation extending from inside the pelvis, in the hernia and down into the left scrotum. This is consistent with an infected inguinal hernia, possible gangrene. WBC was 14, patient was tachycardic. General surgery was consulted and patient was admitted under hospitalist.     Overview/Hospital Course:  Patient is a 44 year old male with history of ESRD, was admitted with suspected inguinal hernia gangrene. General surgery was consulted and patient was taken to OR. Found to have colonic perforation due to mesh erosion with an abscess. Patient underwent sigmoid resection and drainage of the abscess, was started on clindamycin, Vancomycin and cefepime. Nephrology was consulted for chronic dialysis. AVF was not functioning, General surgery consulted, trialysis catheter placed. PRBC transfused during dialysis for low Hb 6.6.  While on broad-spectrum coverage, patient is spiking fevers and worsening leukocytosis, id consulted, discontinued clindamycin, vancomycin and cefepime-added daptomycin, Diflucan and Zosyn.  Repeated CT abdomen and pelvis that showed residual abscess/phlegmon and contained perforation.  Re-consulted surgery who mentioned likely post op changes. Fistulogram 9/24 by vascular surgery for declotting the AV fistula. Trialysis removed 9/25. Had worsening white count, fevers upto 103.5 and hypotension overnight 9/25.  Also had hemoglobin of 7, with worsening hypoxia up to 7 L (overnight desaturated to 70% with lethargy requiring BiPAP placement), after which nephrology recommended 1 unit PRBC transfusion.  Id, Nephrology, Urology, General surgery were updated about his worsening condition.We CT repeated that showed intraperitoneal free air and findings suggestive of necrotizing fasciitis, general surgery was consulted stat, patient was transferred to ICU.  The surgeon performed open laparotomy and noted fecal contamination of the left lower quadrant indicative of anastomotic disruption.  Colon was resected, and colostomy was created. Patient was placed on full liquid diet, was unable to tolerate and back down to clear liquid diet.  Also complaining of excess mucus and cough.  Patient encouraged to use incentive spirometer as directed, and press pillow to abdomen when coughing.    Interval History:  See hospital course    Review of Systems   Constitutional:  Positive for fatigue.   Respiratory:  Positive for cough. Negative for chest tightness, shortness of breath and wheezing.    Cardiovascular:  Negative for chest pain.   Gastrointestinal:  Positive for abdominal pain and nausea.     Objective:     Vital Signs (Most Recent):  Temp: 98.2 °F (36.8 °C) (09/28/24 1152)  Pulse: 108 (09/28/24 1152)  Resp: 16 (09/28/24 1152)  BP: 127/82 (09/28/24 1152)  SpO2: 96 % (09/28/24 1152) Vital Signs (24h Range):  Temp:  [98 °F (36.7 °C)-99 °F (37.2 °C)] 98.2 °F (36.8 °C)  Pulse:  [100-132] 108  Resp:  [15-26] 16  SpO2:  [92 %-100 %] 96 %  BP: ()/(50-82) 127/82     Weight: 134 kg (295 lb 6.7 oz)  Body mass index is 37.93 kg/m².    Intake/Output Summary (Last 24 hours) at 9/28/2024 2146  Last data filed at  9/28/2024 1043  Gross per 24 hour   Intake 859.75 ml   Output 725 ml   Net 134.75 ml         Physical Exam  Vitals reviewed.   Constitutional:       General: He is not in acute distress.     Appearance: He is not diaphoretic.   HENT:      Mouth/Throat:      Mouth: Mucous membranes are moist.   Eyes:      General: No scleral icterus.        Right eye: No discharge.         Left eye: No discharge.   Neck:      Vascular: No JVD.   Cardiovascular:      Rate and Rhythm: Regular rhythm. Tachycardia present.      Comments: R radial art line.  R cephalic vein midline    Pulmonary:      Effort: Pulmonary effort is normal.      Breath sounds: Normal breath sounds.   Abdominal:      General: Bowel sounds are normal. There is distension.      Palpations: Abdomen is soft.      Tenderness: There is abdominal tenderness.      Comments: Midline abd incision.    Incision left groin   Skin:     General: Skin is warm.      Findings: No rash.   Neurological:      Mental Status: He is alert.             Significant Labs: All pertinent labs within the past 24 hours have been reviewed.    Significant Imaging: I have reviewed all pertinent imaging results/findings within the past 24 hours.    Assessment/Plan:      * Inguinal hernia of left side with gangrene  Colonic perforation with abscess  CT abdomen shows Left inguinal hernia containing fat and air as well as marked inflammation extending from inside the pelvis, in the hernia and down into the left scrotum. This is consistent with an infected inguinal hernia, possible gangrene.   Patient was taken to OR and found to have colonic perforation due to mesh erosion. Patient underwent sigmoid resection and I&D.  He's on POD # 10.    Hyperkalemia  Hyperkalemia is likely due to ESRD.The patients most recent potassium results are listed below.  Recent Labs     09/26/24  0501 09/27/24  0359 09/28/24  0403   K 5.3* 4.7 4.6     Plan  - Monitor for arrhythmias with EKG and/or continuous telemetry.    - Treat the hyperkalemia with Potassium Binders and hemodialysis .   - Monitor potassium: Daily  - The patient's hyperkalemia is improving            Sepsis  This patient does have evidence of infective focus  My overall impression is sepsis.  Source: Abdominal  Antibiotics given-   Antibiotics (72h ago, onward)      Start     Stop Route Frequency Ordered    09/27/24 1015  linezolid 600 mg/300 mL IVPB 600 mg         -- IV Every 12 hours (non-standard times) 09/27/24 0909    09/24/24 1800  piperacillin-tazobactam 4.5 g in dextrose 5 % 100 mL IVPB (ready to mix)         -- IV Every 12 hours (non-standard times) 09/24/24 1035            Source control achieved by: IV antibiotics and surgical intervention    Acute hypoxic respiratory failure  Continue supplementing oxygen.  Monitor saturations.  Pulmonology service is consulting.    Perforation of intestine  -patient had open laparotomy with resection of affected colon and creation of colostomy.  He is on post-op day 2.  Continue systemic antibiotics.  General surgeon following up daily.  ID consultant following as well.    Dialysis AV fistula malfunction  Resolved.      Essential hypertension  Monitor blood pressure.  Patient able to take oral antihypertensives again.  He's on hydralazine, losartan, clonidine, and metoprolol.  Adjust doses based on pressure response.    Sleep apnea  CPAP per home settings       Pulmonary hypertension  Chronic.  No need to address acutely.    Severe obesity with body mass index (BMI) of 35.0 to 39.9 with comorbidity  Body mass index is 37.93 kg/m². Morbid obesity complicates all aspects of disease management from diagnostic modalities to treatment. Weight loss encouraged and health benefits explained to patient.         Anemia due to chronic kidney disease, on chronic dialysis  Anemia is likely due to chronic disease due to ESRD. Most recent hemoglobin and hematocrit are listed below.  Recent Labs     09/25/24  2877 09/26/24  5227  09/26/24  0954 09/27/24  0359   HGB 9.5* 8.9* 8.2* 7.9*   HCT 29.4* 27.7*  --  25.3*       Plan  - Monitor serial CBC: Daily  - HGB stable at this time    ESRD (end stage renal disease)  Nephrology service ordering routine dialysis      VTE Risk Mitigation (From admission, onward)           Ordered     heparin (porcine) injection 4,000 Units  As needed (PRN)         09/20/24 1544     heparin (porcine) injection 7,500 Units  Every 8 hours         09/17/24 1031     IP VTE HIGH RISK PATIENT  Once         09/17/24 1031     Place sequential compression device  Until discontinued         09/17/24 1031                    Discharge Planning   MARIA ISABEL: 10/1/2024     Code Status: Full Code   Is the patient medically ready for discharge?:     Reason for patient still in hospital (select all that apply): Patient trending condition  Discharge Plan A: Rehab   Discharge Delays: None known at this time              Aubrey Wellington NP  Department of Hospital Medicine   [facility]

## 2024-09-28 NOTE — NURSING
Nurses Note -- 4 Eyes      9/27/2024   11:14 PM      Skin assessed during: Transfer      [] No Altered Skin Integrity Present    []Prevention Measures Documented      [x] Yes- Altered Skin Integrity Present or Discovered   [] LDA Added if Not in Epic (Describe Wound)   [] New Altered Skin Integrity was Present on Admit and Documented in LDA   [x] Wound Image Taken    Wound Care Consulted? No    Attending Nurse:  [id number]    Second RN/Staff Member:  [id number]

## 2024-09-28 NOTE — PT/OT/SLP PROGRESS
Physical Therapy Treatment    Patient Name:  João Ham   MRN:  [id number]    Recommendations:     Discharge Recommendations: High Intensity Therapy  Discharge Equipment Recommendations: to be determined by next level of care  Barriers to discharge:  weakness, impaired self care skills, impaired functional mobility, impaired cognition, decreased LE function, impaired activity tolerance.     Assessment:     João Ham is a 44 y.o. male admitted with a medical diagnosis of Inguinal hernia of left side with gangrene.  He presents with the following impairments/functional limitations: weakness, impaired self care skills, impaired functional mobility, gait instability, impaired balance, impaired cognition, decreased lower extremity function, pain, impaired skin, impaired cardiopulmonary response to activity.    Pt found resting with HOB elevated, agreeable to skilled physical therapy session today.     Pt noted to be lethargic and not safe to attempt mobilizing to EOB. Supine therapeutic exercises performed including heel slides, ankle pumps, SLR's, hip AB/ADD, glute sets, and quad sets x10 with AA for increased ROM and proper technique.     maxA x2 required to scoot pt toward HOB for increased comfort and improved body positioning. Pt left with HOB elevated, bed alarm on, call light within reach, all needs met, and RN notified.     Rehab Prognosis: Fair; patient would benefit from acute skilled PT services to address these deficits and reach maximum level of function.    Recent Surgery: Procedure(s) (LRB):  LAPAROTOMY, EXPLORATORY (N/A)  COLON RESECTION  MOBILIZATION, SPLENIC FLEXURE 3 Days Post-Op    Plan:     During this hospitalization, patient to be seen daily to address the identified rehab impairments via gait training, therapeutic activities, therapeutic exercises and progress toward the following goals:    Plan of Care Expires:  10/26/24    Subjective     Chief Complaint: tired  Patient/Family Comments/goals:  to get better  Pain/Comfort:  Location - Orientation 1: generalized  Location 1: abdomen  Pain Addressed 1: Distraction, Cessation of Activity, Nurse notified      Objective:     Communicated with RN prior to session.  Patient found HOB elevated with telemetry, peripheral IV, oxygen, arterial line, BERNABE drain, pulse ox (continuous), pressure relief boots upon PT entry to room.     General Precautions: Standard, fall  Orthopedic Precautions:    Braces:    Respiratory Status: Nasal cannula, flow 2 L/min     Functional Mobility:  No functional mobility performed today.       AM-PAC 6 CLICK MOBILITY          Treatment & Education:  Pt educated on importance of time OOB, importance of intermittent mobility, safe techniques for transfers/ambulation, discharge recommendations/options, and use of call light for assistance and fall prevention.      Patient left HOB elevated with all lines intact, call button in reach, bed alarm on, and RN notified..    GOALS:   Multidisciplinary Problems       Physical Therapy Goals          Problem: Physical Therapy    Goal Priority Disciplines Outcome Interventions   Physical Therapy Goal     PT, PT/OT Progressing    Description: 1. Supine to sit with Stand-by Assistance  2. Sit to stand transfer with Stand-by Assistance  3.. Bed to chair transfer with Supervision using Rolling Walker  4. Gait  x 100 feet with Minimal Assistance using least restrictive assistive device.                          Time Tracking:     PT Received On: 09/28/24  PT Start Time: 1408     PT Stop Time: 1419  PT Total Time (min): 11 min     Billable Minutes: Therapeutic Exercise 11    Treatment Type: Treatment  PT/PTA: PTA     Number of PTA visits since last PT visit: 1 09/28/2024

## 2024-09-28 NOTE — PROGRESS NOTES
[facility]   Department of Infectious Disease  Progress Note        PATIENT NAME: João Ham  MRN: [id number]  TODAY'S DATE: 09/28/2024  ADMIT DATE: 9/17/2024  LOS: 11 days    CHIEF COMPLAINT: Fatigue and Groin Swelling (X 2 days.  Hx of Kidney failure and CHF)      PRINCIPLE PROBLEM: Inguinal hernia of left side with gangrene    INTERVAL HISTORY      09/23/2024: Seen and evaluated at bedside.  No acute issues.  Leukocytosis persists.  Afebrile.      09/24/2024:  Leukocytosis.  Remains afebrile.  Fluid in BERNABE drain remains dark green.  Seen by vascular surgeon with plan for left upper extremity AV fistula fistulogram.    09/25/2024: Leukocytosis persists.  He is otherwise stable.  Left inguinal drainage fluid sent for Gram stain and culture.  G stain was negative.  Culture in progress.  He remains on broad-spectrum antimicrobials.  He was seen by urologist yesterday with plan for conservative management of left scrotal edema with orchitis.  Repeat CT this morning showed gas in the abdomen resident concern for anastomotic leak.     09/26/2024:  He had exploratory laparotomy yesterday with colon resection and end colostomy, mobilization of splenic flexure and left inguinal canal exploration.  A Penrose drain was placed in the inguinal canal.  Left IJ Vas-Cath was removed yesterday.    09/27/2024:  Leukocytosis.  Fever cough trending down.  Cultures from surgery so far negative.  Pain control better.    09/28/2024:  WBC back up to 24 K. cultures have grown E coli so far with 1 strain resistant to ceftriaxone and tetracycline.  Having nausea and is on clear liquid diet.  States he is Not feeling good.    Antibiotics (From admission, onward)      Start     Stop Route Frequency Ordered    09/27/24 1015  linezolid 600 mg/300 mL IVPB 600 mg         -- IV Every 12 hours (non-standard times) 09/27/24 0909    09/24/24 1800  piperacillin-tazobactam 4.5 g in dextrose 5 % 100 mL IVPB (ready to mix)         -- IV  Every 12 hours (non-standard times) 09/24/24 1035    09/17/24 2100  mupirocin 2 % ointment         09/22/24 2059 Nasl 2 times daily 09/17/24 1845          Antifungals (From admission, onward)      Start     Stop Route Frequency Ordered    09/22/24 1930  fluconazole (DIFLUCAN) IVPB 200 mg         -- IV Every 24 hours (non-standard times) 09/22/24 1520           Antivirals (From admission, onward)      None            ASSESSMENT and PLAN      1. Incarcerated left inguinal hernia with necrosis and left inguinal abscess.  Had I&D of abscess, partial sigmoid colectomy with primary anastomosis on 09/17/2024.  Then had anastomotic leak and went back to surgery 09/25/2024 for laparotomy, colectomy and colostomy with of left inguinal area.  Continue antibiotics    2. Leukocytosis.  As above    3. Scrotal edema worse on the left.  Diagnosed with orchitis by urologist.  Antibiotics as above and continue other conservative management.    4. ESRD on hemodialysis Monday/ Wednesday/Friday.  He is on transplant list.  Currently has a nonfunctioning left upper extremity AV fistula.    RECOMMENDATIONS:    Continue linezolid and Zosyn   Continue fluconazole for now.  May switch to micafungin if leukocytosis persists  Check KUB    Please send Epic secure chat with any questions.    SUBJECTIVE      Antibiotics   Vancomycin: 09/17/2024-09/20/2024   Clindamycin:  09/07/2020 04/09/2020 2/24   Cefepime: 09/07/2024-09/22/2024   Daptomycin: 09/22/2024-  Fluconazole: 09/22/2024-  Zosyn: 09/22/2024-    Microbiology  Blood culture 09/07/2024: NGTD   Urine culture 09/19/2024: NGTD   Blood culture 09/24/2024:  NGTD   Left groin drainage culture 09/24/2024:  E coli in broth only resistant to tetracycline, cefazolin, ampicillin and ceftriaxone  Abdomen incision site culture 09/24/2024:  E coli in broth only sensitive to ceftriaxone and tetracycline    Review of Systems  Negative except as stated above in Interval History     OBJECTIVE   Temp:  [98  °F (36.7 °C)-99 °F (37.2 °C)] 98 °F (36.7 °C)  Pulse:  [100-132] 102  Resp:  [11-26] 18  SpO2:  [92 %-100 %] 96 %  BP: ()/(50-81) 127/74  Temp:  [98 °F (36.7 °C)-99 °F (37.2 °C)]   Temp: 98 °F (36.7 °C) (09/28/24 0813)  Pulse: 102 (09/28/24 0813)  Resp: 18 (09/28/24 1102)  BP: 127/74 (09/28/24 0813)  SpO2: 96 % (09/28/24 0813)    Intake/Output Summary (Last 24 hours) at 9/28/2024 1111  Last data filed at 9/28/2024 1043  Gross per 24 hour   Intake 1659.75 ml   Output 5025 ml   Net -3365.25 ml       Physical Exam  General:  Middle-aged man who is ill-looking.    Abdomen:  Distended.  Dry dressing midline.  Colostomy left lower abdomen with BERNABE drain.  Dressing left groin area with wound packing.  Genitals:  Firm Edematous scrotum worse on the left.  Nontender  CVS: S1 and 2 heard, no murmurs appreciated   Respiratory: Clear to auscultation   Skin: No rash appreciated   Musculoskeletal system: No joint or bony abnormalities appreciated   CNS: No gross focal deficits  Psych: Good mood, normal affect.    VAD:  Right radial arterial line, PIV  ISOLATION:  None    WOUNDS:  Abdominal surgical wounds, open left groin wound with packing    Significant Labs: All pertinent labs within the past 24 hours have been reviewed.    CBC LAST 7 DAYS  Recent Labs   Lab 09/23/24  0918 09/24/24  1122 09/25/24  0236 09/25/24  1737 09/26/24  0501 09/26/24  0954 09/27/24  0359 09/28/24  0403   WBC 17.64* 18.77* 17.03* 22.95* 20.66*  --  20.31* 24.45*   RBC 2.61* 2.29* 2.26* 3.13* 3.00*  --  2.66* 2.87*   HGB 8.0* 7.1* 7.2* 9.5* 8.9* 8.2* 7.9* 8.5*   HCT 25.4* 22.6* 22.2* 29.4* 27.7*  --  25.3* 27.7*   MCV 97 99* 98 94 92  --  95 97   MCH 30.7 31.0 31.9* 30.4 29.7  --  29.7 29.6   MCHC 31.5* 31.4* 32.4 32.3 32.1  --  31.2* 30.7*   RDW 19.9* 20.4* 20.0* 22.7* 23.3*  --  23.1* 22.5*    384 389 470* 443  --  466* 560*   MPV 10.5 10.3 10.3 10.3 10.5  --  10.5 10.2   GRAN 77.0* 70.0 81.0* 79.0* 81.0*  --  73.0 76.0*   LYMPH 12.0*   "CANCELED 5.0* 3.0* 8.0* 0.0*  --  6.0* 1.0*   MONO 2.0*  CANCELED 12.0 2.0* 2.0* 8.0  --  7.0 5.0   BASO CANCELED  --   --   --   --   --   --   --    NRBC 0 0 1* 0 0  --  0 0       CHEMISTRY LAST 7 DAYS  Recent Labs   Lab 09/23/24  0918 09/24/24  1122 09/25/24  0236 09/25/24  1051 09/25/24  1737 09/26/24  0501 09/27/24  0359 09/28/24  0403   * 136 137  --  136 135* 137 137   K 4.0 4.2 4.0  --  4.4 5.3* 4.7 4.6   CL 93* 93* 97  --  98 96 97 97   CO2 22* 22* 25  --  18* 24 24 23   ANIONGAP 17* 21* 15  --  20* 15 16 17*   BUN 61* 75* 46*  --  51* 39* 33* 37*   CREATININE 16.0* 17.7* 13.0*  --  13.4* 11.4* 10.3* 10.1*   * 101 136*  --  108 110 106 112*   CALCIUM 9.1 8.8 8.9  --  8.7 8.7 9.0 9.7   PH  --   --   --  7.404  --   --   --   --    MG 2.2 2.3 2.2  --  2.2 2.0 2.2 2.7*   ALBUMIN 3.2* 3.0* 3.1*  --  3.0* 2.9* 2.9* 3.2*   PROT 7.5 7.3 7.2  --  7.4 7.3 7.3 8.3   ALKPHOS 114 113 161*  --  152* 124 106 133   ALT 26 31 48*  --  45* 37 26 33   AST 46* 37 64*  --  49* 38 36 54*   BILITOT 0.5 0.5 0.6  --  0.9 0.7 0.6 0.5       Estimated Creatinine Clearance: 13.7 mL/min (A) (based on SCr of 10.1 mg/dL (H)).    INFLAMMATORY/PROCAL  LAST 7 DAYS  Recent Labs   Lab 09/24/24  1514   PROCAL 13.932*   CRP 35.70*     No results found for: "ESR"  CRP   Date Value Ref Range Status   09/24/2024 35.70 (H) <1.00 mg/dL Final     Comment:     CRP-Normal Application expected values:   <1.0        mg/dL   Normal Range  1.0 - 5.0  mg/dL   Indicates mild inflammation  5.0 - 10.0 mg/dL   Indicates severe inflammation  >10.0        mg/dL   Represents serious processes and   frequently         indicates the presence of a bacterial   infection.      09/20/2024 >16.00 (H) <1.00 mg/dL Final     Comment:     CRP-Normal Application expected values:   <1.0        mg/dL   Normal Range  1.0 - 5.0  mg/dL   Indicates mild inflammation  5.0 - 10.0 mg/dL   Indicates severe inflammation  >10.0        mg/dL   Represents serious processes " and   frequently         indicates the presence of a bacterial   infection.      07/16/2024 >16.00 (H) <1.00 mg/dL Final     Comment:     CRP-Normal Application expected values:   <1.0        mg/dL   Normal Range  1.0 - 5.0  mg/dL   Indicates mild inflammation  5.0 - 10.0 mg/dL   Indicates severe inflammation  >10.0        mg/dL   Represents serious processes and   frequently         indicates the presence of a bacterial   infection.          PRIOR  MICROBIOLOGY:    Susceptibility data from last 90 days.  Collected Specimen Info Organism Amp/Sulbactam Ampicillin Cefazolin Cefepime Ceftriaxone Ciprofloxacin Clindamycin Ertapenem Erythromycin Gentamicin Levofloxacin Meropenem Oxacillin Penicillin   09/25/24 Incision site from Abdomen Escherichia coli  I  R  R  S  S  S   S   S  S  S     09/24/24 Incision site from Groin Escherichia coli  R  R  R  S  R  S   S   S  S  S     09/17/24 Blood from Peripheral, Antecubital, Right No growth after 5 days.                 09/17/24 Blood from Peripheral, Antecubital, Right No growth after 5 days.                 07/19/24 Catheter Tip, Tunneled Staphylococcus aureus      S   S   I     S  R   07/16/24 Blood from Peripheral, Forearm, Right Staphylococcus aureus                 07/15/24 Blood from Peripheral, Antecubital, Right Staphylococcus aureus      S   S   S     S  R   07/15/24 Blood from Peripheral, Hand, Right Staphylococcus aureus                   Collected Specimen Info Organism PIPERACILLIN/TAZO SUSCEPTIBILITY Tetracycline Tobramycin Trimeth/Sulfa Vancomycin   09/25/24 Incision site from Abdomen Escherichia coli  S  S  S  S    09/24/24 Incision site from Groin Escherichia coli  S  R  S  S    09/17/24 Blood from Peripheral, Antecubital, Right No growth after 5 days.        09/17/24 Blood from Peripheral, Antecubital, Right No growth after 5 days.        07/19/24 Catheter Tip, Tunneled Staphylococcus aureus   S   S  S   07/16/24 Blood from Peripheral, Forearm, Right  Staphylococcus aureus        07/15/24 Blood from Peripheral, Antecubital, Right Staphylococcus aureus   S   S  S   07/15/24 Blood from Peripheral, Hand, Right Staphylococcus aureus            LAST 7 DAYS MICROBIOLOGY   Microbiology Results (last 7 days)       Procedure Component Value Units Date/Time    Aerobic culture [[id number]]  (Abnormal)  (Susceptibility) Collected: 09/24/24 1535    Order Status: Completed Specimen: Incision site from Groin Updated: 09/28/24 0738     Aerobic Bacterial Culture ESCHERICHIA COLI  From broth only      Narrative:      Left groin incision site drainage swab    Aerobic culture [[id number]]  (Abnormal)  (Susceptibility) Collected: 09/25/24 1413    Order Status: Completed Specimen: Incision site from Abdomen Updated: 09/28/24 0737     Aerobic Bacterial Culture ESCHERICHIA COLI  From broth only      Blood culture [[id number]] Collected: 09/24/24 1513    Order Status: Completed Specimen: Blood Updated: 09/27/24 1632     Blood Culture, Routine No Growth to date      No Growth to date      No Growth to date      No Growth to date    Narrative:      Collection has been rescheduled by [email or web address] at 09/23/2024 14:35 Reason:   Patient unavailable dialysis   Collection has been rescheduled by MYB at 09/23/2024 18:44 Reason:   Unable to collect  Collection has been rescheduled by CZB at 09/23/2024 19:02 Reason:   Unable to collect  Collection has been rescheduled by RE1 at 09/24/2024 09:43 Reason:   Spoke to the patient's nurse about unable to collect she is   contacting Lambert and the doctor  Collection has been rescheduled by [email or web address] at 09/23/2024 14:35 Reason:   Patient unavailable dialysis   Collection has been rescheduled by MYB at 09/23/2024 18:44 Reason:   Unable to collect  Collection has been rescheduled by CZB at 09/23/2024 19:02 Reason:   Unable to collect  Collection has been rescheduled by RE1 at 09/24/2024 09:43 Reason:   Spoke to the patient's nurse about unable to collect she is    contacting Lambert and the doctor    Gram stain [[id number]] Collected: 09/25/24 1413    Order Status: Completed Specimen: Incision site from Abdomen Updated: 09/27/24 1530     Gram Stain Result Moderate WBC's      No organisms seen    Blood culture [[id number]] Collected: 09/25/24 1111    Order Status: Completed Specimen: Blood from Peripheral, Antecubital, Right Updated: 09/27/24 1432     Blood Culture, Routine No Growth to date      No Growth to date      No Growth to date    Narrative:      [id number]    Blood culture [[id number]] Collected: 09/25/24 1111    Order Status: Completed Specimen: Blood from Peripheral, Forearm, Right Updated: 09/27/24 1432     Blood Culture, Routine No Growth to date      No Growth to date      No Growth to date    Narrative:      [id number]    Blood culture [[id number]] Collected: 09/24/24 1122    Order Status: Completed Specimen: Blood Updated: 09/27/24 1232     Blood Culture, Routine No Growth to date      No Growth to date      No Growth to date      No Growth to date    Narrative:      Collection has been rescheduled by [email or web address] at 09/23/2024 14:35 Reason:   Patient unavailable dialysis   Collection has been rescheduled by MYB at 09/23/2024 18:44 Reason:   Unable to collect  Collection has been rescheduled by CZB at 09/23/2024 19:02 Reason:   Unable to collect  Collection has been rescheduled by RE1 at 09/24/2024 09:43 Reason:   Spoke to the patient's nurse about unable to collect she is   contacting Lambert and the doctor  Collection has been rescheduled by [email or web address] at 09/23/2024 14:35 Reason:   Patient unavailable dialysis   Collection has been rescheduled by MYB at 09/23/2024 18:44 Reason:   Unable to collect  Collection has been rescheduled by CZB at 09/23/2024 19:02 Reason:   Unable to collect  Collection has been rescheduled by RE1 at 09/24/2024 09:43 Reason:   Spoke to the patient's nurse about unable to collect she is   contacting Lambert and the doctor    IV catheter culture [[id number]]  Collected: 09/25/24 1132    Order Status: Completed Specimen: Catheter Tip, Dialysis Updated: 09/27/24 1028     Aerobic Culture - Cath tip No growth    Narrative:      Trialysis    Culture, Anaerobe [[id number]] Collected: 09/25/24 1413    Order Status: Sent Specimen: Incision site from Abdomen Updated: 09/25/24 1653    AFB Culture & Smear [[id number]] Collected: 09/25/24 1413    Order Status: Sent Specimen: Incision site from Abdomen Updated: 09/25/24 1652    Fungus culture [[id number]] Collected: 09/25/24 1413    Order Status: Sent Specimen: Incision site from Abdomen Updated: 09/25/24 1652    Gram stain [[id number]] Collected: 09/24/24 1535    Order Status: Completed Specimen: Incision site from Groin Updated: 09/24/24 1829     Gram Stain Result Few WBC's      No organisms seen    Narrative:      Left groin incision site drainage    Blood culture x two cultures. Draw prior to antibiotics. [[id number]] Collected: 09/17/24 0949    Order Status: Completed Specimen: Blood from Peripheral, Antecubital, Right Updated: 09/22/24 1632     Blood Culture, Routine No growth after 5 days.    Narrative:      Aerobic and anaerobic    Blood culture x two cultures. Draw prior to antibiotics. [[id number]] Collected: 09/17/24 0949    Order Status: Completed Specimen: Blood from Peripheral, Antecubital, Right Updated: 09/22/24 1632     Blood Culture, Routine No growth after 5 days.    Narrative:      Aerobic and anaerobic    Urine culture [[id number]] Collected: 09/19/24 1435    Order Status: Completed Specimen: Urine Updated: 09/22/24 0740     Urine Culture, Routine No growth    Narrative:      Specimen Source->Urine            CURRENT/PREVIOUS VISIT EKG  Results for orders placed or performed during the hospital encounter of 09/17/24   EKG 12-lead    Collection Time: 09/17/24  8:57 AM   Result Value Ref Range    QRS Duration 92 ms    [facility] QTC Calculation 469 ms    Narrative    Test Reason : N18.6,    Vent. Rate : 119 BPM     Atrial  Rate : 119 BPM     P-R Int : 134 ms          QRS Dur : 092 ms      QT Int : 334 ms       P-R-T Axes : 027 -78 004 degrees     QTc Int : 469 ms    Sinus tachycardia  Left axis deviation  Cannot rule out Anterior infarct ,age undetermined  Abnormal ECG  When compared with ECG of 15-JUL-2024 17:47,  T wave inversion now evident in Inferior leads  T wave inversion no longer evident in Lateral leads  Confirmed by Jose Cruz MD ([id number]) on 9/22/2024 1:02:21 PM    Referred By: BRANDON   SELF           Confirmed By:Jose Cruz MD     Significant Imaging: I have reviewed all relevant and available imaging results/findings within the past 24 hours.    I spent a total of 45 minutes on the day of the visit.This includes face to face time and non-face to face time preparing to see the patient (eg, review of tests), obtaining and/or reviewing separately obtained history, documenting clinical information in the electronic or other health record, independently interpreting results and communicating results to the patient/family/caregiver, or care coordinator.    Kerwin Au MD  Date of Service: 09/28/2024      This note was created using [employer] voice recognition software that occasionally misinterpreted phrases or words.

## 2024-09-29 PROBLEM — N45.2 ORCHITIS: Status: ACTIVE | Noted: 2024-09-29

## 2024-09-29 LAB
BACTERIA BLD CULT: NORMAL
BACTERIA BLD CULT: NORMAL
BASOPHILS NFR BLD: 0 % (ref 0–1.9)
DIFFERENTIAL METHOD BLD: ABNORMAL
EOSINOPHIL NFR BLD: 1 % (ref 0–8)
ERYTHROCYTE [DISTWIDTH] IN BLOOD BY AUTOMATED COUNT: 22.7 % (ref 11.5–14.5)
HCT VFR BLD AUTO: 27.4 % (ref 40–54)
HGB BLD-MCNC: 8.5 G/DL (ref 14–18)
HYPOCHROMIA BLD QL SMEAR: ABNORMAL
IMM GRANULOCYTES # BLD AUTO: ABNORMAL K/UL (ref 0–0.04)
IMM GRANULOCYTES NFR BLD AUTO: ABNORMAL % (ref 0–0.5)
LYMPHOCYTES NFR BLD: 11 % (ref 18–48)
MCH RBC QN AUTO: 30.7 PG (ref 27–31)
MCHC RBC AUTO-ENTMCNC: 31 G/DL (ref 32–36)
MCV RBC AUTO: 99 FL (ref 82–98)
METAMYELOCYTES NFR BLD MANUAL: 3 %
MONOCYTES NFR BLD: 7 % (ref 4–15)
MYELOCYTES NFR BLD MANUAL: 1 %
NEUTROPHILS NFR BLD: 72 % (ref 38–73)
NEUTS BAND NFR BLD MANUAL: 4 %
NRBC BLD-RTO: 0 /100 WBC
PLATELET # BLD AUTO: 560 K/UL (ref 150–450)
PLATELET BLD QL SMEAR: ABNORMAL
PMV BLD AUTO: 10.7 FL (ref 9.2–12.9)
POCT GLUCOSE: 102 MG/DL (ref 70–110)
POCT GLUCOSE: 120 MG/DL (ref 70–110)
POCT GLUCOSE: 142 MG/DL (ref 70–110)
POCT GLUCOSE: 151 MG/DL (ref 70–110)
POLYCHROMASIA BLD QL SMEAR: ABNORMAL
PROMYELOCYTES NFR BLD MANUAL: 1 %
RBC # BLD AUTO: 2.77 M/UL (ref 4.6–6.2)
TARGETS BLD QL SMEAR: ABNORMAL
WBC # BLD AUTO: 19.88 K/UL (ref 3.9–12.7)

## 2024-09-29 PROCEDURE — 99232 SBSQ HOSP IP/OBS MODERATE 35: CPT | Mod: ,,, | Performed by: INTERNAL MEDICINE

## 2024-09-29 PROCEDURE — 63600175 PHARM REV CODE 636 W HCPCS: Performed by: INTERNAL MEDICINE

## 2024-09-29 PROCEDURE — 25000003 PHARM REV CODE 250: Performed by: SURGERY

## 2024-09-29 PROCEDURE — 94660 CPAP INITIATION&MGMT: CPT

## 2024-09-29 PROCEDURE — 25000003 PHARM REV CODE 250: Performed by: INTERNAL MEDICINE

## 2024-09-29 PROCEDURE — 97530 THERAPEUTIC ACTIVITIES: CPT | Mod: CQ

## 2024-09-29 PROCEDURE — 63600175 PHARM REV CODE 636 W HCPCS: Performed by: SURGERY

## 2024-09-29 PROCEDURE — 85007 BL SMEAR W/DIFF WBC COUNT: CPT | Performed by: SURGERY

## 2024-09-29 PROCEDURE — 85027 COMPLETE CBC AUTOMATED: CPT | Performed by: SURGERY

## 2024-09-29 PROCEDURE — 99900031 HC PATIENT EDUCATION (STAT)

## 2024-09-29 PROCEDURE — 94761 N-INVAS EAR/PLS OXIMETRY MLT: CPT

## 2024-09-29 PROCEDURE — 36415 COLL VENOUS BLD VENIPUNCTURE: CPT | Performed by: SURGERY

## 2024-09-29 PROCEDURE — 12000002 HC ACUTE/MED SURGE SEMI-PRIVATE ROOM

## 2024-09-29 PROCEDURE — 99900035 HC TECH TIME PER 15 MIN (STAT)

## 2024-09-29 PROCEDURE — 27100171 HC OXYGEN HIGH FLOW UP TO 24 HOURS

## 2024-09-29 RX ORDER — ISOSORBIDE MONONITRATE 30 MG/1
30 TABLET, EXTENDED RELEASE ORAL DAILY
Status: DISCONTINUED | OUTPATIENT
Start: 2024-09-30 | End: 2024-10-06 | Stop reason: HOSPADM

## 2024-09-29 RX ADMIN — HYDRALAZINE HYDROCHLORIDE 100 MG: 25 TABLET ORAL at 09:09

## 2024-09-29 RX ADMIN — HEPARIN SODIUM 7500 UNITS: 5000 INJECTION INTRAVENOUS; SUBCUTANEOUS at 05:09

## 2024-09-29 RX ADMIN — HEPARIN SODIUM 7500 UNITS: 5000 INJECTION INTRAVENOUS; SUBCUTANEOUS at 03:09

## 2024-09-29 RX ADMIN — LINEZOLID 600 MG: 600 INJECTION, SOLUTION INTRAVENOUS at 10:09

## 2024-09-29 RX ADMIN — PIPERACILLIN SODIUM AND TAZOBACTAM SODIUM 4.5 G: 4; .5 INJECTION, POWDER, LYOPHILIZED, FOR SOLUTION INTRAVENOUS at 03:09

## 2024-09-29 RX ADMIN — HEPARIN SODIUM 7500 UNITS: 5000 INJECTION INTRAVENOUS; SUBCUTANEOUS at 09:09

## 2024-09-29 RX ADMIN — GABAPENTIN 200 MG: 100 CAPSULE ORAL at 09:09

## 2024-09-29 RX ADMIN — HYDRALAZINE HYDROCHLORIDE 100 MG: 25 TABLET ORAL at 05:09

## 2024-09-29 RX ADMIN — PIPERACILLIN SODIUM AND TAZOBACTAM SODIUM 4.5 G: 4; .5 INJECTION, POWDER, LYOPHILIZED, FOR SOLUTION INTRAVENOUS at 11:09

## 2024-09-29 RX ADMIN — FLUCONAZOLE 200 MG: 2 INJECTION, SOLUTION INTRAVENOUS at 09:09

## 2024-09-29 RX ADMIN — NITROGLYCERIN 1 INCH: 20 OINTMENT TOPICAL at 05:09

## 2024-09-29 RX ADMIN — LINEZOLID 600 MG: 600 INJECTION, SOLUTION INTRAVENOUS at 12:09

## 2024-09-29 NOTE — PROGRESS NOTES
[facility]   Department of Infectious Disease  Progress Note        PATIENT NAME: João Ham  MRN: [id number]  TODAY'S DATE: 09/29/2024  ADMIT DATE: 9/17/2024  LOS: 12 days    CHIEF COMPLAINT: Fatigue and Groin Swelling (X 2 days.  Hx of Kidney failure and CHF)      PRINCIPLE PROBLEM: Inguinal hernia of left side with gangrene    INTERVAL HISTORY      09/23/2024: Seen and evaluated at bedside.  No acute issues.  Leukocytosis persists.  Afebrile.      09/24/2024:  Leukocytosis.  Remains afebrile.  Fluid in BERNABE drain remains dark green.  Seen by vascular surgeon with plan for left upper extremity AV fistula fistulogram.    09/25/2024: Leukocytosis persists.  He is otherwise stable.  Left inguinal drainage fluid sent for Gram stain and culture.  G stain was negative.  Culture in progress.  He remains on broad-spectrum antimicrobials.  He was seen by urologist yesterday with plan for conservative management of left scrotal edema with orchitis.  Repeat CT this morning showed gas in the abdomen resident concern for anastomotic leak.     09/26/2024:  He had exploratory laparotomy yesterday with colon resection and end colostomy, mobilization of splenic flexure and left inguinal canal exploration.  A Penrose drain was placed in the inguinal canal.  Left IJ Vas-Cath was removed yesterday.    09/27/2024:  Leukocytosis.  Fever cough trending down.  Cultures from surgery so far negative.  Pain control better.    09/28/2024:  WBC back up to 24 K. cultures have grown E coli so far with 1 strain resistant to ceftriaxone and tetracycline.  Having nausea and is on clear liquid diet.  States he is Not feeling good.    09/29/2024:  He is feeling much better.  WBC down to 20 K.  Tolerating oral feeds.  KUB showed gas-filled bowel loops on the left side concerning for ileus.  Also showed lateral abdominal wall gas.    Antibiotics (From admission, onward)      Start     Stop Route Frequency Ordered    09/27/24 1015   linezolid 600 mg/300 mL IVPB 600 mg         -- IV Every 12 hours (non-standard times) 09/27/24 0909    09/24/24 1800  piperacillin-tazobactam 4.5 g in dextrose 5 % 100 mL IVPB (ready to mix)         -- IV Every 12 hours (non-standard times) 09/24/24 1035    09/17/24 2100  mupirocin 2 % ointment         09/22/24 2059 Nasl 2 times daily 09/17/24 1845          Antifungals (From admission, onward)      Start     Stop Route Frequency Ordered    09/22/24 1930  fluconazole (DIFLUCAN) IVPB 200 mg         -- IV Every 24 hours (non-standard times) 09/22/24 1520           Antivirals (From admission, onward)      None            ASSESSMENT and PLAN      1. Incarcerated left inguinal hernia with necrosis and left inguinal abscess.  Had I&D of abscess, partial sigmoid colectomy with primary anastomosis on 09/17/2024.  Then had anastomotic leak and went back to surgery 09/25/2024 for laparotomy, colectomy and colostomy with of left inguinal area.  Continue antibiotics    2. Leukocytosis.  As above    3. Scrotal edema worse on the left.  Diagnosed with orchitis by urologist.  Antibiotics as above and continue other conservative management.    4. ESRD on hemodialysis Monday/ Wednesday/Friday.  He is on transplant list.  Currently has a nonfunctioning left upper extremity AV fistula.    RECOMMENDATIONS:    Continue linezolid and Zosyn   Continue fluconazole for now.  May switch to micafungin if leukocytosis persists    Please send Epic secure chat with any questions.  Discussed with patient and his grown father at bedside.    SUBJECTIVE      Antibiotics   Vancomycin: 09/17/2024-09/20/2024   Clindamycin:  09/07/2020 04/09/2020 2/24   Cefepime: 09/07/2024-09/22/2024   Daptomycin: 09/22/2024-  Fluconazole: 09/22/2024-  Zosyn: 09/22/2024-    Microbiology  Blood culture 09/07/2024: NGTD   Urine culture 09/19/2024: NGTD   Blood culture 09/24/2024:  NGTD   Left groin drainage culture 09/24/2024:  E coli in broth only resistant to  tetracycline, cefazolin, ampicillin and ceftriaxone  Abdomen incision site culture 09/24/2024:  E coli in broth only sensitive to ceftriaxone and tetracycline    Review of Systems  Negative except as stated above in Interval History     OBJECTIVE   Temp:  [97.9 °F (36.6 °C)-98.5 °F (36.9 °C)] 97.9 °F (36.6 °C)  Pulse:  [] 65  Resp:  [12-18] 16  SpO2:  [94 %-100 %] 94 %  BP: ()/(56-88) 144/88  Temp:  [97.9 °F (36.6 °C)-98.5 °F (36.9 °C)]   Temp: 97.9 °F (36.6 °C) (09/29/24 1200)  Pulse: 65 (09/29/24 1200)  Resp: 16 (09/29/24 1200)  BP: (!) 144/88 (09/29/24 1200)  SpO2: (!) 94 % (09/29/24 1200)    Intake/Output Summary (Last 24 hours) at 9/29/2024 1214  Last data filed at 9/29/2024 0910  Gross per 24 hour   Intake 1100 ml   Output 575 ml   Net 525 ml       Physical Exam  General:  Middle-aged man lying quietly in bed.  Looks much better today  Abdomen:  Distended.  Dry dressing midline.  Colostomy left lower abdomen with BERNABE drain.  Dressing left groin area with wound packing.  Genitals:  Firm Edematous scrotum worse on the left.  Nontender  CVS: S1 and 2 heard, no murmurs appreciated   Respiratory: Clear to auscultation   Skin: No rash appreciated   Musculoskeletal system: No joint or bony abnormalities appreciated   CNS: No gross focal deficits  Psych: Good mood, normal affect.    VAD:  Right radial arterial line, PIV  ISOLATION:  None    WOUNDS:  Abdominal surgical wounds, open left groin wound with packing    Significant Labs: All pertinent labs within the past 24 hours have been reviewed.    CBC LAST 7 DAYS  Recent Labs   Lab 09/23/24  0918 09/24/24  1122 09/25/24  0236 09/25/24  1737 09/26/24  0501 09/26/24  0954 09/27/24  0359 09/28/24  0403 09/29/24  1110   WBC 17.64* 18.77* 17.03* 22.95* 20.66*  --  20.31* 24.45* 19.88*   RBC 2.61* 2.29* 2.26* 3.13* 3.00*  --  2.66* 2.87* 2.77*   HGB 8.0* 7.1* 7.2* 9.5* 8.9* 8.2* 7.9* 8.5* 8.5*   HCT 25.4* 22.6* 22.2* 29.4* 27.7*  --  25.3* 27.7* 27.4*   MCV 97 99*  "98 94 92  --  95 97 99*   MCH 30.7 31.0 31.9* 30.4 29.7  --  29.7 29.6 30.7   MCHC 31.5* 31.4* 32.4 32.3 32.1  --  31.2* 30.7* 31.0*   RDW 19.9* 20.4* 20.0* 22.7* 23.3*  --  23.1* 22.5* 22.7*    384 389 470* 443  --  466* 560* 560*   MPV 10.5 10.3 10.3 10.3 10.5  --  10.5 10.2 10.7   GRAN 77.0* 70.0 81.0* 79.0* 81.0*  --  73.0 76.0*  --    LYMPH 12.0*  CANCELED 5.0* 3.0* 8.0* 0.0*  --  6.0* 1.0*  --    MONO 2.0*  CANCELED 12.0 2.0* 2.0* 8.0  --  7.0 5.0  --    BASO CANCELED  --   --   --   --   --   --   --   --    NRBC 0 0 1* 0 0  --  0 0  --        CHEMISTRY LAST 7 DAYS  Recent Labs   Lab 09/23/24  0918 09/24/24  1122 09/25/24  0236 09/25/24  1051 09/25/24  1737 09/26/24  0501 09/27/24  0359 09/28/24  0403   * 136 137  --  136 135* 137 137   K 4.0 4.2 4.0  --  4.4 5.3* 4.7 4.6   CL 93* 93* 97  --  98 96 97 97   CO2 22* 22* 25  --  18* 24 24 23   ANIONGAP 17* 21* 15  --  20* 15 16 17*   BUN 61* 75* 46*  --  51* 39* 33* 37*   CREATININE 16.0* 17.7* 13.0*  --  13.4* 11.4* 10.3* 10.1*   * 101 136*  --  108 110 106 112*   CALCIUM 9.1 8.8 8.9  --  8.7 8.7 9.0 9.7   PH  --   --   --  7.404  --   --   --   --    MG 2.2 2.3 2.2  --  2.2 2.0 2.2 2.7*   ALBUMIN 3.2* 3.0* 3.1*  --  3.0* 2.9* 2.9* 3.2*   PROT 7.5 7.3 7.2  --  7.4 7.3 7.3 8.3   ALKPHOS 114 113 161*  --  152* 124 106 133   ALT 26 31 48*  --  45* 37 26 33   AST 46* 37 64*  --  49* 38 36 54*   BILITOT 0.5 0.5 0.6  --  0.9 0.7 0.6 0.5       Estimated Creatinine Clearance: 13.7 mL/min (A) (based on SCr of 10.1 mg/dL (H)).    INFLAMMATORY/PROCAL  LAST 7 DAYS  Recent Labs   Lab 09/24/24  1514   PROCAL 13.932*   CRP 35.70*     No results found for: "ESR"  CRP   Date Value Ref Range Status   09/24/2024 35.70 (H) <1.00 mg/dL Final     Comment:     CRP-Normal Application expected values:   <1.0        mg/dL   Normal Range  1.0 - 5.0  mg/dL   Indicates mild inflammation  5.0 - 10.0 mg/dL   Indicates severe inflammation  >10.0        mg/dL   Represents " serious processes and   frequently         indicates the presence of a bacterial   infection.      09/20/2024 >16.00 (H) <1.00 mg/dL Final     Comment:     CRP-Normal Application expected values:   <1.0        mg/dL   Normal Range  1.0 - 5.0  mg/dL   Indicates mild inflammation  5.0 - 10.0 mg/dL   Indicates severe inflammation  >10.0        mg/dL   Represents serious processes and   frequently         indicates the presence of a bacterial   infection.      07/16/2024 >16.00 (H) <1.00 mg/dL Final     Comment:     CRP-Normal Application expected values:   <1.0        mg/dL   Normal Range  1.0 - 5.0  mg/dL   Indicates mild inflammation  5.0 - 10.0 mg/dL   Indicates severe inflammation  >10.0        mg/dL   Represents serious processes and   frequently         indicates the presence of a bacterial   infection.          PRIOR  MICROBIOLOGY:    Susceptibility data from last 90 days.  Collected Specimen Info Organism Amp/Sulbactam Ampicillin Cefazolin Cefepime Ceftriaxone Ciprofloxacin Clindamycin Ertapenem Erythromycin Gentamicin Levofloxacin Meropenem Oxacillin Penicillin   09/25/24 Incision site from Abdomen Culture in progress                 09/25/24 Incision site from Abdomen Escherichia coli  I  R  R  S  S  S   S   S  S  S     09/24/24 Incision site from Groin Escherichia coli  R  R  R  S  R  S   S   S  S  S     09/17/24 Blood from Peripheral, Antecubital, Right No growth after 5 days.                 09/17/24 Blood from Peripheral, Antecubital, Right No growth after 5 days.                 07/19/24 Catheter Tip, Tunneled Staphylococcus aureus      S   S   I     S  R   07/16/24 Blood from Peripheral, Forearm, Right Staphylococcus aureus                 07/15/24 Blood from Peripheral, Antecubital, Right Staphylococcus aureus      S   S   S     S  R   07/15/24 Blood from Peripheral, Hand, Right Staphylococcus aureus                   Collected Specimen Info Organism PIPERACILLIN/TAZO SUSCEPTIBILITY Tetracycline  Tobramycin Trimeth/Sulfa Vancomycin   09/25/24 Incision site from Abdomen Culture in progress        09/25/24 Incision site from Abdomen Escherichia coli  S  S  S  S    09/24/24 Incision site from Groin Escherichia coli  S  R  S  S    09/17/24 Blood from Peripheral, Antecubital, Right No growth after 5 days.        09/17/24 Blood from Peripheral, Antecubital, Right No growth after 5 days.        07/19/24 Catheter Tip, Tunneled Staphylococcus aureus   S   S  S   07/16/24 Blood from Peripheral, Forearm, Right Staphylococcus aureus        07/15/24 Blood from Peripheral, Antecubital, Right Staphylococcus aureus   S   S  S   07/15/24 Blood from Peripheral, Hand, Right Staphylococcus aureus            LAST 7 DAYS MICROBIOLOGY   Microbiology Results (last 7 days)       Procedure Component Value Units Date/Time    Blood culture [[id number]] Collected: 09/24/24 1513    Order Status: Completed Specimen: Blood Updated: 09/28/24 1632     Blood Culture, Routine No Growth to date      No Growth to date      No Growth to date      No Growth to date      No Growth to date    Narrative:      Collection has been rescheduled by [email or web address] at 09/23/2024 14:35 Reason:   Patient unavailable dialysis   Collection has been rescheduled by MYB at 09/23/2024 18:44 Reason:   Unable to collect  Collection has been rescheduled by CZB at 09/23/2024 19:02 Reason:   Unable to collect  Collection has been rescheduled by RE1 at 09/24/2024 09:43 Reason:   Spoke to the patient's nurse about unable to collect she is   contacting [facility] and the doctor  Collection has been rescheduled by [email or web address] at 09/23/2024 14:35 Reason:   Patient unavailable dialysis   Collection has been rescheduled by MYB at 09/23/2024 18:44 Reason:   Unable to collect  Collection has been rescheduled by CZB at 09/23/2024 19:02 Reason:   Unable to collect  Collection has been rescheduled by RE1 at 09/24/2024 09:43 Reason:   Spoke to the patient's nurse about unable to collect she is    contacting [facility] and the doctor    AFB Culture & Smear [[id number]] Collected: 09/25/24 1413    Order Status: Completed Specimen: Incision site from Abdomen Updated: 09/28/24 1630     AFB CULTURE STAIN No acid fast bacilli seen.     AFB CULTURE STAIN Testing performed by:     [facility] CULTURE STAIN [facility] [place]     [facility] [address] [facility]     [place] [address]     AFB CULTURE STAIN Dr. Osbaldo Sherwood MD    Blood culture [[id number]] Collected: 09/25/24 1111    Order Status: Completed Specimen: Blood from Peripheral, Antecubital, Right Updated: 09/28/24 1432     Blood Culture, Routine No Growth to date      No Growth to date      No Growth to date      No Growth to date    Narrative:      [id number]    Blood culture [[id number]] Collected: 09/25/24 1111    Order Status: Completed Specimen: Blood from Peripheral, Forearm, Right Updated: 09/28/24 1432     Blood Culture, Routine No Growth to date      No Growth to date      No Growth to date      No Growth to date    Narrative:      [id number]    Culture, Anaerobe [[id number]] Collected: 09/25/24 1413    Order Status: Completed Specimen: Incision site from Abdomen Updated: 09/28/24 1405     Anaerobic Culture Culture in progress    Blood culture [[id number]] Collected: 09/24/24 1122    Order Status: Completed Specimen: Blood Updated: 09/28/24 1232     Blood Culture, Routine No Growth to date      No Growth to date      No Growth to date      No Growth to date      No Growth to date    Narrative:      Collection has been rescheduled by [email or web address] at 09/23/2024 14:35 Reason:   Patient unavailable dialysis   Collection has been rescheduled by MYB at 09/23/2024 18:44 Reason:   Unable to collect  Collection has been rescheduled by CZB at 09/23/2024 19:02 Reason:   Unable to collect  Collection has been rescheduled by RE1 at 09/24/2024 09:43 Reason:   Spoke to the patient's nurse about unable to collect she is   contacting [facility] and the  doctor  Collection has been rescheduled by [email or web address] at 09/23/2024 14:35 Reason:   Patient unavailable dialysis   Collection has been rescheduled by MYB at 09/23/2024 18:44 Reason:   Unable to collect  Collection has been rescheduled by CZB at 09/23/2024 19:02 Reason:   Unable to collect  Collection has been rescheduled by RE1 at 09/24/2024 09:43 Reason:   Spoke to the patient's nurse about unable to collect she is   contacting [facility] and the doctor    IV catheter culture [[id number]] Collected: 09/25/24 1132    Order Status: Completed Specimen: Catheter Tip, Dialysis Updated: 09/28/24 1113     Aerobic Culture - Cath tip No growth    Narrative:      Trialysis    Aerobic culture [[id number]]  (Abnormal)  (Susceptibility) Collected: 09/24/24 1535    Order Status: Completed Specimen: Incision site from Groin Updated: 09/28/24 0738     Aerobic Bacterial Culture ESCHERICHIA COLI  From broth only      Narrative:      Left groin incision site drainage swab    Aerobic culture [[id number]]  (Abnormal)  (Susceptibility) Collected: 09/25/24 1413    Order Status: Completed Specimen: Incision site from Abdomen Updated: 09/28/24 0737     Aerobic Bacterial Culture ESCHERICHIA COLI  From broth only      Gram stain [[id number]] Collected: 09/25/24 1413    Order Status: Completed Specimen: Incision site from Abdomen Updated: 09/27/24 1530     Gram Stain Result Moderate WBC's      No organisms seen    Fungus culture [[id number]] Collected: 09/25/24 1413    Order Status: Sent Specimen: Incision site from Abdomen Updated: 09/25/24 1652    Gram stain [[id number]] Collected: 09/24/24 1535    Order Status: Completed Specimen: Incision site from Groin Updated: 09/24/24 1829     Gram Stain Result Few WBC's      No organisms seen    Narrative:      Left groin incision site drainage    Blood culture x two cultures. Draw prior to antibiotics. [[id number]] Collected: 09/17/24 0949    Order Status: Completed Specimen: Blood from Peripheral, Antecubital,  Right Updated: 09/22/24 1632     Blood Culture, Routine No growth after 5 days.    Narrative:      Aerobic and anaerobic    Blood culture x two cultures. Draw prior to antibiotics. [[id number]] Collected: 09/17/24 0949    Order Status: Completed Specimen: Blood from Peripheral, Antecubital, Right Updated: 09/22/24 1632     Blood Culture, Routine No growth after 5 days.    Narrative:      Aerobic and anaerobic            CURRENT/PREVIOUS VISIT EKG  Results for orders placed or performed during the hospital encounter of 09/17/24   EKG 12-lead    Collection Time: 09/17/24  8:57 AM   Result Value Ref Range    QRS Duration 92 ms    [facility] QTC Calculation 469 ms    Narrative    Test Reason : N18.6,    Vent. Rate : 119 BPM     Atrial Rate : 119 BPM     P-R Int : 134 ms          QRS Dur : 092 ms      QT Int : 334 ms       P-R-T Axes : 027 -78 004 degrees     QTc Int : 469 ms    Sinus tachycardia  Left axis deviation  Cannot rule out Anterior infarct ,age undetermined  Abnormal ECG  When compared with ECG of 15-JUL-2024 17:47,  T wave inversion now evident in Inferior leads  T wave inversion no longer evident in Lateral leads  Confirmed by Jose Cruz MD ([id number]) on 9/22/2024 1:02:21 PM    Referred By: [email or web address]   SELF           Confirmed By:Jose Cruz MD     Significant Imaging: I have reviewed all relevant and available imaging results/findings within the past 24 hours.    I spent a total of 45 minutes on the day of the visit.This includes face to face time and non-face to face time preparing to see the patient (eg, review of tests), obtaining and/or reviewing separately obtained history, documenting clinical information in the electronic or other health record, independently interpreting results and communicating results to the patient/family/caregiver, or care coordinator.    Kerwin Au MD  Date of Service: 09/29/2024      This note was created using [employer] voice recognition software that occasionally  misinterpreted phrases or words.

## 2024-09-29 NOTE — ASSESSMENT & PLAN NOTE
W/ Colonic perforation with abscess  CT abdomen shows Left inguinal hernia containing fat and air as well as marked inflammation extending from inside the pelvis, in the hernia and down into the left scrotum.   S/P Robotic sigmoid resection, Mobilization of splenic flexure, left inguinal canal exploration, and I&D 9/17 per Dr. Connors

## 2024-09-29 NOTE — ASSESSMENT & PLAN NOTE
Chronic, controlled. Latest blood pressure and vitals reviewed-     Temp:  [97.9 °F (36.6 °C)-98.5 °F (36.9 °C)]   Pulse:  []   Resp:  [12-18]   BP: ()/(56-88)   SpO2:  [94 %-100 %] .   Home meds for hypertension were reviewed and noted below.   Hypertension Medications               cloNIDine (CATAPRES) 0.3 MG tablet TAKE 1 TABLET BY MOUTH THREE TIMES DAILY    hydrALAZINE (APRESOLINE) 100 MG tablet Take 1 tablet (100 mg total) by mouth 3 (three) times daily.    isosorbide mononitrate (IMDUR) 120 MG 24 hr tablet Take 120 mg by mouth once daily.    metoprolol succinate (TOPROL-XL) 50 MG 24 hr tablet Take 150 mg by mouth once daily.    olmesartan (BENICAR) 40 MG tablet Take 1 tablet by mouth once daily        While in the hospital, will manage blood pressure as follows; Continue current antihypertensive regimen: hydralazine, losartan, clonidine, and metoprolol.    Will utilize p.r.n. blood pressure medication only if patient's blood pressure greater than 180/110 and he develops symptoms such as worsening chest pain or shortness of breath.

## 2024-09-29 NOTE — PROGRESS NOTES
"Pulmonary/Critical Care Progress Note      PATIENT NAME: João Ham  MRN: [id number]  TODAY'S DATE: 2024  10:56 AM  ADMIT DATE: 2024  AGE: 44 y.o. : 1980    CONSULT REQUESTED BY: Marichuy Crum DO    REASON FOR CONSULT:   Severe sepsis    HPI:  The patient is a 44-year-old end-stage renal patient who had surgery a week ago for perforated sigmoid colon secondary to mesh with perforation into the inguinal canal.  The patient began running a fever last night.  Today he is still febrile in his blood pressure is getting softer.  The patient has significant discomfort in his lower abdomen bilaterally.  Scrotum is quite firm.  CT shows air in the abdomen with subcutaneous air as well.  Dr. Connors is planning to take him back to surgery today.  He was seen by Dr. Lopez who does not feel any intervention is necessary for his scrotum.     the patient is doing well postoperatively.  He is still on BiPAP this morning.  He now has a colostomy and a larger dressing over his inguinal canal.     the patient is without complaints.  He slept on his BiPAP last night he perceives this entire episode to be heavy".    - no new complaints. Wearing bipap nightly        REVIEW OF SYSTEMS  GENERAL: Feeling confused  EYES: Vision is good.  ENT: No sinusitis or pharyngitis.   HEART: No chest pain or palpitations.  LUNGS:  No dyspnea  GI:  Not complaining of abdominal pain  :  He denies scrotal pain.  SKIN: No lesions or rashes.  MUSCULOSKELETAL: No joint pain or myalgias.  NEURO: No headaches or neuropathy.  LYMPH: No edema or adenopathy.  PSYCH: No anxiety or depression.  ENDO: No weight change.    ANo change in the patient's Past Medical History, Past Surgical History, Social History or Family History since admission.      VITAL SIGNS (MOST RECENT)  Temp: 97.9 °F (36.6 °C) (24 1200)  Pulse: 65 (24 1200)  Resp: 16 (24 1200)  BP: (!) 144/88 (24 1200)  SpO2: (!) 94 % (24 " "1200)    INTAKE AND OUTPUT (LAST 24 HOURS):  Intake/Output Summary (Last 24 hours) at 9/29/2024 1348  Last data filed at 9/29/2024 0910  Gross per 24 hour   Intake 1100 ml   Output 575 ml   Net 525 ml       WEIGHT  Wt Readings from Last 1 Encounters:   09/18/24 134 kg (295 lb 6.7 oz)       PHYSICAL EXAM  GENERAL:  Mid aged patient in no distress.  HEENT: Pupils equal and reactive. Extraocular movements intact. Nose intact. Pharynx moist.  NECK: Supple.   HEART:  regular rate and rhythm. No murmur or gallop auscultated.  LUNGS:  L lower lung fine rales otherwise clear breath sounds. Lung excursion symmetrical. No change in fremitus.   ABDOMEN: Bowel sounds present.  There is now a midline laparotomy incision. There is a colostomy on the left.    : not examined  EXTREMITIES: Normal muscle tone and joint movement, no cyanosis or clubbing.   LYMPHATICS: No adenopathy palpated, no edema.  SKIN: Dry, intact, no lesions.   NEURO: Cranial nerves II-XII intact. Motor strength 5/5 bilaterally, upper and lower extremities.  PSYCH: Appropriate affect      CBC LAST (LAST 24 HOURS)  Recent Labs   Lab 09/29/24  1110   WBC 19.88*   RBC 2.77*   HGB 8.5*   HCT 27.4*   MCV 99*   MCH 30.7   MCHC 31.0*   RDW 22.7*   *   MPV 10.7   GRAN 72.0   LYMPH 11.0*   MONO 7.0   NRBC 0       CHEMISTRY LAST (LAST 24 HOURS)  No results for input(s): "NA", "K", "CL", "CO2", "ANIONGAP", "BUN", "CREATININE", "GLU", "CALCIUM", "PH", "MG", "ALBUMIN", "PROT", "ALKPHOS", "ALT", "AST", "BILITOT" in the last 24 hours.        LAST 7 DAYS MICROBIOLOGY   Microbiology Results (last 7 days)       Procedure Component Value Units Date/Time    Blood culture [[id number]] Collected: 09/24/24 1122    Order Status: Completed Specimen: Blood Updated: 09/29/24 1232     Blood Culture, Routine No growth after 5 days.    Narrative:      Collection has been rescheduled by [email or web address] at 09/23/2024 14:35 Reason:   Patient unavailable dialysis   Collection has been rescheduled " by MYB at 09/23/2024 18:44 Reason:   Unable to collect  Collection has been rescheduled by CZB at 09/23/2024 19:02 Reason:   Unable to collect  Collection has been rescheduled by RE1 at 09/24/2024 09:43 Reason:   Spoke to the patient's nurse about unable to collect she is   contacting Lambert and the doctor  Collection has been rescheduled by [email or web address] at 09/23/2024 14:35 Reason:   Patient unavailable dialysis   Collection has been rescheduled by MYB at 09/23/2024 18:44 Reason:   Unable to collect  Collection has been rescheduled by CZB at 09/23/2024 19:02 Reason:   Unable to collect  Collection has been rescheduled by RE1 at 09/24/2024 09:43 Reason:   Spoke to the patient's nurse about unable to collect she is   contacting Lambert and the doctor    Blood culture [[id number]] Collected: 09/24/24 1513    Order Status: Completed Specimen: Blood Updated: 09/28/24 1632     Blood Culture, Routine No Growth to date      No Growth to date      No Growth to date      No Growth to date      No Growth to date    Narrative:      Collection has been rescheduled by [email or web address] at 09/23/2024 14:35 Reason:   Patient unavailable dialysis   Collection has been rescheduled by MYB at 09/23/2024 18:44 Reason:   Unable to collect  Collection has been rescheduled by CZB at 09/23/2024 19:02 Reason:   Unable to collect  Collection has been rescheduled by RE1 at 09/24/2024 09:43 Reason:   Spoke to the patient's nurse about unable to collect she is   contacting Lambert and the doctor  Collection has been rescheduled by [email or web address] at 09/23/2024 14:35 Reason:   Patient unavailable dialysis   Collection has been rescheduled by MYB at 09/23/2024 18:44 Reason:   Unable to collect  Collection has been rescheduled by CZB at 09/23/2024 19:02 Reason:   Unable to collect  Collection has been rescheduled by RE1 at 09/24/2024 09:43 Reason:   Spoke to the patient's nurse about unable to collect she is   contacting Lambert and the doctor    AFB Culture & Smear [[id number]] Collected:  09/25/24 1413    Order Status: Completed Specimen: Incision site from Abdomen Updated: 09/28/24 1630     AFB CULTURE STAIN No acid fast bacilli seen.     AFB CULTURE STAIN Testing performed by:     AFB CULTURE STAIN Lab [facility] [place]     AFB CULTURE STAIN [id number] [facility]     [place] [address]     AFB CULTURE STAIN Dr. Osbaldo Sherwood MD    Blood culture [[id number]] Collected: 09/25/24 1111    Order Status: Completed Specimen: Blood from Peripheral, Antecubital, Right Updated: 09/28/24 1432     Blood Culture, Routine No Growth to date      No Growth to date      No Growth to date      No Growth to date    Narrative:      [id number]    Blood culture [[id number]] Collected: 09/25/24 1111    Order Status: Completed Specimen: Blood from Peripheral, Forearm, Right Updated: 09/28/24 1432     Blood Culture, Routine No Growth to date      No Growth to date      No Growth to date      No Growth to date    Narrative:      [id number]    Culture, Anaerobe [[id number]] Collected: 09/25/24 1413    Order Status: Completed Specimen: Incision site from Abdomen Updated: 09/28/24 1405     Anaerobic Culture Culture in progress    IV catheter culture [[id number]] Collected: 09/25/24 1132    Order Status: Completed Specimen: Catheter Tip, Dialysis Updated: 09/28/24 1113     Aerobic Culture - Cath tip No growth    Narrative:      Trialysis    Aerobic culture [[id number]]  (Abnormal)  (Susceptibility) Collected: 09/24/24 1535    Order Status: Completed Specimen: Incision site from Groin Updated: 09/28/24 0738     Aerobic Bacterial Culture ESCHERICHIA COLI  From broth only      Narrative:      Left groin incision site drainage swab    Aerobic culture [[id number]]  (Abnormal)  (Susceptibility) Collected: 09/25/24 1413    Order Status: Completed Specimen: Incision site from Abdomen Updated: 09/28/24 0737     Aerobic Bacterial Culture ESCHERICHIA COLI  From broth only      Gram stain [[id number]] Collected: 09/25/24 1413     Order Status: Completed Specimen: Incision site from Abdomen Updated: 09/27/24 1530     Gram Stain Result Moderate WBC's      No organisms seen    Fungus culture [[id number]] Collected: 09/25/24 1413    Order Status: Sent Specimen: Incision site from Abdomen Updated: 09/25/24 1652    Gram stain [[id number]] Collected: 09/24/24 1535    Order Status: Completed Specimen: Incision site from Groin Updated: 09/24/24 1829     Gram Stain Result Few WBC's      No organisms seen    Narrative:      Left groin incision site drainage    Blood culture x two cultures. Draw prior to antibiotics. [[id number]] Collected: 09/17/24 0949    Order Status: Completed Specimen: Blood from Peripheral, Antecubital, Right Updated: 09/22/24 1632     Blood Culture, Routine No growth after 5 days.    Narrative:      Aerobic and anaerobic    Blood culture x two cultures. Draw prior to antibiotics. [[id number]] Collected: 09/17/24 0949    Order Status: Completed Specimen: Blood from Peripheral, Antecubital, Right Updated: 09/22/24 1632     Blood Culture, Routine No growth after 5 days.    Narrative:      Aerobic and anaerobic            MOST RECENT IMAGING  X-Ray KUB  Narrative: EXAMINATION:  XR KUB    CLINICAL HISTORY:  Nausea with abdominal discomfort.  Recent laparotomy with colostomy;    COMPARISON:  09/26/2024 radiography    CT abdomen and pelvis 09/25/2024    FINDINGS:  Crescentic soft tissue gas involving left lateral abdominal wall, which correlates to findings on reference CT.  There is a percutaneous drain entering at the right lower quadrant, with tip terminating in the left hemipelvis.  Gas-filled mildly distended loops of small bowel in the left mid abdomen.  Rounded hyperdense material in the pelvis.  No acute osseous abnormality appreciated.  Impression: Localized gas involving the left lateral abdominal wall, corresponding to reference CT abdomen findings.    Gas-filled mildly distended loop of bowel in the left mid abdomen which  could be on the basis of postoperative ileus or low-grade obstruction.    Indeterminate hyperdense object in the pelvis which could represent contrast in the rectum or urinary bladder.    Electronically signed by: Clark Joshi  Date:    09/28/2024  Time:    15:48      CURRENT VISIT EKG  Results for orders placed or performed during the hospital encounter of 09/17/24   EKG 12-lead    Narrative    Ordered by an unspecified provider.       ECHOCARDIOGRAM RESULTS  Results for orders placed during the hospital encounter of 07/15/24    Echo    Interpretation Summary    Limited echo only for EF and to look at valves due to bacteremia.    Left Ventricle: The left ventricle is normal in size. Increased wall thickness. There is concentric remodeling. There is normal systolic function with a visually estimated ejection fraction of 55 - 60%.    Aortic Valve: There is no mass/vegetation present.    Mitral Valve: There is no mass/vegetation present.    Tricuspid Valve: There is no mass/vegetation present.    Pulmonic Valve: There is no mass/vegetation present.            LAST ARTERIAL BLOOD GAS  ABG  Recent Labs   Lab 09/25/24  1051   PH 7.404   PO2 87   PCO2 37.7   HCO3 23.6*   BE -1       IMPRESSION AND PLAN  Intrabominal sepsis- E coli  Bilateral epididyrmal orchitis with a complex hydrocele  - anastomotic leak found and washed out.  The patient now has a colostomy.  - antimicrobials per ID  - surgery following    ESRD  - dialysis per nephrology    CLOVIS  - continue bipap nightly and with naps  - resume home bipap when discharged      Anemia  - patient was transfused 2 units of packed red blood cells intraoperatively  - getting Epo    Morbid obesity/moderate hypoalbuminemia  Hypertension  - home meds- management per hospitalist    Wound care  PT/OT  Pulmonary will sign off at this time; please call if further questions arise.    Angie Hussein MD  Pulmonary & Critical Care Medicine

## 2024-09-29 NOTE — NURSING
Having a very difficult time collecting the BMP  lab. Did ultrasound draws x 2 which clotted. And the phlebotomist unable to draw as well, notified shaan/Np okay to draw tomorrow with dialysis

## 2024-09-29 NOTE — PROGRESS NOTES
General Surgery Progress Note    Admit Date: 9/17/2024  S/P: Procedure(s) (LRB):  LAPAROTOMY, EXPLORATORY (N/A)  COLON RESECTION  MOBILIZATION, SPLENIC FLEXURE    Post-operative Day: 4 Days Post-Op    Hospital Day: 13    SUBJECTIVE:   Status post re-exploration abdomen with Aidan's procedure.    Continues to have ostomy output. Below desired eat however, requesting stay on clear liquids. It was wanting to get out of bed today.    OBJECTIVE:     Vital Signs (Most Recent)  Temp:  [98.2 °F (36.8 °C)-98.5 °F (36.9 °C)] 98.5 °F (36.9 °C)  Pulse:  [] 60  Resp:  [12-18] 12  SpO2:  [96 %-100 %] 100 %  BP: ()/(56-82) 96/56    I&Os:  I/O last 3 completed shifts:  In: 1229.8 [P.O.:480; IV Piggyback:749.8]  Out: 950 [Emesis/NG output:500; Drains:15; Stool:435]    Physical Exam:  Gen: NAD, AAOx3  HEENT: Anicteric sclera  Pulm: unlabored, symmetrical   Abd: Soft, appropriate tenderness palpation, BERNABE drain in place through serosanguineous output, end ostomy pink and viable with stool in bag, midline bandage intact with minimal soilage, left groin bandage with mild soilage, Penrose drain x2 present, packing removed.    Laboratory:  CBC:   Recent Labs   Lab 09/28/24  0403   WBC 24.45*   RBC 2.87*   HGB 8.5*   HCT 27.7*   *   MCV 97   MCH 29.6   MCHC 30.7*     CMP:   Recent Labs   Lab 09/28/24  0403   *   CALCIUM 9.7   ALBUMIN 3.2*   PROT 8.3      K 4.6   CO2 23   CL 97   BUN 37*   CREATININE 10.1*   ALKPHOS 133   ALT 33   AST 54*   BILITOT 0.5     Labs within the past 24 hours have been reviewed.    ASSESSMENT/PLAN:     Patient Active Problem List    Diagnosis Date Noted    Hyperkalemia 09/26/2024    Perforation of intestine 09/25/2024    Acute hypoxic respiratory failure 09/25/2024    Sepsis 09/25/2024    Dialysis AV fistula malfunction 09/19/2024    Inguinal hernia of left side with gangrene 09/17/2024    Patient on waiting list for kidney transplant 08/22/2024    Essential hypertension  07/17/2024    Bacteremia 07/15/2024    Sleep apnea 03/31/2023    Hyperparathyroidism 01/10/2023    Erectile dysfunction 01/10/2023    Pulmonary hypertension 11/29/2022    Hypertensive heart and renal disease with congestive heart failure 11/29/2022    Severe obesity with body mass index (BMI) of 35.0 to 39.9 with comorbidity 10/17/2022    ESRD (end stage renal disease) 10/10/2022    Anemia due to chronic kidney disease, on chronic dialysis 10/10/2022    Persistent proteinuria 10/10/2022         44 y.o. male it was status post ex lap with end ostomy  -continue clear liquids for now, will add ensure clear  -diet may be advanced at patient was request since he was having ostomy output  -continue antibiotics and antifungals per Infectious Disease recommendations  -continue current pain regimen  -encourage out of bed to chair and ambulation, incentive spirometer use  -HD per Nephrology

## 2024-09-29 NOTE — ASSESSMENT & PLAN NOTE
Creatine stable for now. BMP reviewed- noted Estimated Creatinine Clearance: 13.7 mL/min (A) (based on SCr of 10.1 mg/dL (H)). according to latest data. Based on current GFR, CKD stage is end stage.  Monitor UOP and serial BMP and adjust therapy as needed. Renally dose meds. Avoid nephrotoxic medications and procedures.  Nephrology following  Dialysis management per Nephrology  Renal diet

## 2024-09-29 NOTE — PROGRESS NOTES
[facility] Medicine  Progress Note    Patient Name: João Ham  MRN: [id number]  Patient Class: IP- Inpatient   Admission Date: 9/17/2024  Length of Stay: 12 days  Attending Physician: Marichuy Crum DO  Primary Care Provider: Dawson Granado MD        Subjective:     Principal Problem:Inguinal hernia of left side with gangrene        HPI:  Patient is a 44 year old male with history of ESDR on dialysis MWF, awaiting kidney transplant, HTN, presented to ED with 3 days history of left groin pain and swelling. States swelling comes every time he cough and is being painful. Patient has low grade fever 99s at home. He has been vomiting bilious fluid last 2-3 days. No diarrhea or abdominal pain.     In the ED CT abdomen showed Left inguinal hernia containing fat and air as well as marked inflammation extending from inside the pelvis, in the hernia and down into the left scrotum. This is consistent with an infected inguinal hernia, possible gangrene. WBC was 14, patient was tachycardic. General surgery was consulted and patient was admitted under hospitalist.     Overview/Hospital Course:  Patient is a 44 year old male with history of ESRD, was admitted with suspected inguinal hernia gangrene. General surgery was consulted and patient was taken to OR. Found to have colonic perforation due to mesh erosion with an abscess. Patient underwent sigmoid resection and drainage of the abscess, was started on clindamycin, Vancomycin and cefepime. Nephrology was consulted for chronic dialysis. AVF was not functioning, General surgery consulted, trialysis catheter placed. PRBC transfused during dialysis for low Hb 6.6.  While on broad-spectrum coverage, patient is spiking fevers and worsening leukocytosis, id consulted, discontinued clindamycin, vancomycin and cefepime-added daptomycin, Diflucan and Zosyn.  Repeated CT abdomen and pelvis that showed residual abscess/phlegmon and contained perforation.  Re-consulted surgery who mentioned likely post op changes. Fistulogram 9/24 by vascular surgery for declotting the AV fistula. Trialysis removed 9/25. Had worsening white count, fevers upto 103.5 and hypotension overnight 9/25.  Also had hemoglobin of 7, with worsening hypoxia up to 7 L (overnight desaturated to 70% with lethargy requiring BiPAP placement), after which nephrology recommended 1 unit PRBC transfusion.  Id, Nephrology, Urology, General surgery were updated about his worsening condition.We CT repeated that showed intraperitoneal free air and findings suggestive of necrotizing fasciitis, general surgery was consulted stat, patient was transferred to ICU.  The surgeon performed open laparotomy and noted fecal contamination of the left lower quadrant indicative of anastomotic disruption.  Colon was resected, and colostomy was created. Patient was placed on full liquid diet, was unable to tolerate and back down to clear liquid diet.  Also complaining of excess mucus and cough.  Patient encouraged to use incentive spirometer as directed, and press pillow to abdomen when coughing.    Interval History:  Lying in bed, awake and alert.  Reports nausea better today.  Still on clear liquid diet currently.  Pain wanes and waxes.  Pain medication helps.  Endorses his colostomy was emptying this morning in his working.  WBC trending down.  CMP/Mg level are pending.  Wants to start getting out of bed.    Review of Systems   Constitutional:  Positive for activity change, appetite change and fatigue.   Gastrointestinal:  Positive for abdominal pain (Surgical) and nausea (Improving).   Genitourinary:         Dialysis.  Essentially anuric.   All other systems reviewed and are negative.    Objective:     Vital Signs (Most Recent):  Temp: 98.2 °F (36.8 °C) (09/29/24 0407)  Pulse: 89 (09/29/24 0407)  Resp: 17 (09/29/24 0407)  BP: 128/62 (09/29/24 0407)  SpO2: 99 % (09/29/24 0421) Vital Signs (24h Range):  Temp:  [98 °F (36.7  °C)-98.5 °F (36.9 °C)] 98.2 °F (36.8 °C)  Pulse:  [] 89  Resp:  [16-18] 17  SpO2:  [95 %-99 %] 99 %  BP: (110-128)/(62-82) 128/62     Weight: 134 kg (295 lb 6.7 oz)  Body mass index is 37.93 kg/m².    Intake/Output Summary (Last 24 hours) at 9/29/2024 0722  Last data filed at 9/29/2024 0608  Gross per 24 hour   Intake 830 ml   Output 225 ml   Net 605 ml         Physical Exam  Constitutional:       General: He is not in acute distress.     Appearance: He is obese.   Eyes:      Extraocular Movements: Extraocular movements intact.      Conjunctiva/sclera: Conjunctivae normal.      Pupils: Pupils are equal, round, and reactive to light.   Cardiovascular:      Rate and Rhythm: Normal rate and regular rhythm.      Heart sounds: Normal heart sounds.   Pulmonary:      Effort: Pulmonary effort is normal. No respiratory distress.      Breath sounds: Normal breath sounds.   Abdominal:      General: There is distension (Mild).      Tenderness: There is generalized abdominal tenderness (postop pain).      Comments: Colostomy intact to LLQ, stoma pink and moist   Musculoskeletal:         General: No swelling. Normal range of motion.      Cervical back: Normal range of motion and neck supple.   Skin:     General: Skin is warm and dry.      Capillary Refill: Capillary refill takes less than 2 seconds.      Comments: Midline abdominal surgical dressing CDI.  BERNABE drain intact with serosanguineous drainage.  Left groin dressing CDI with a Penrose.   Neurological:      General: No focal deficit present.      Mental Status: He is alert and oriented to person, place, and time.      Motor: Weakness (Generalized) present.             Significant Labs: All pertinent labs within the past 24 hours have been reviewed.  CBC:   Recent Labs   Lab 09/28/24  0403 09/29/24  1110   WBC 24.45* 19.88*   HGB 8.5* 8.5*   HCT 27.7* 27.4*   * 560*     CMP:   Recent Labs   Lab 09/28/24  0403      K 4.6   CL 97   CO2 23   *   BUN 37*    CREATININE 10.1*   CALCIUM 9.7   PROT 8.3   ALBUMIN 3.2*   BILITOT 0.5   ALKPHOS 133   AST 54*   ALT 33   ANIONGAP 17*     Magnesium:   Recent Labs   Lab 09/28/24  0403   MG 2.7*     Microbiology Results (last 7 days)       Procedure Component Value Units Date/Time    Blood culture [[id number]] Collected: 09/24/24 1513    Order Status: Completed Specimen: Blood Updated: 09/28/24 1632     Blood Culture, Routine No Growth to date      No Growth to date      No Growth to date      No Growth to date      No Growth to date    Narrative:      Collection has been rescheduled by [email or web address] at 09/23/2024 14:35 Reason:   Patient unavailable dialysis   Collection has been rescheduled by MYB at 09/23/2024 18:44 Reason:   Unable to collect  Collection has been rescheduled by CZB at 09/23/2024 19:02 Reason:   Unable to collect  Collection has been rescheduled by RE1 at 09/24/2024 09:43 Reason:   Spoke to the patient's nurse about unable to collect she is   contacting Lambert and the doctor  Collection has been rescheduled by [email or web address] at 09/23/2024 14:35 Reason:   Patient unavailable dialysis   Collection has been rescheduled by MYB at 09/23/2024 18:44 Reason:   Unable to collect  Collection has been rescheduled by CZB at 09/23/2024 19:02 Reason:   Unable to collect  Collection has been rescheduled by RE1 at 09/24/2024 09:43 Reason:   Spoke to the patient's nurse about unable to collect she is   contacting Lambert and the doctor    AFB Culture & Smear [[id number]] Collected: 09/25/24 1413    Order Status: Completed Specimen: Incision site from Abdomen Updated: 09/28/24 1630     AFB CULTURE STAIN No acid fast bacilli seen.     AFB CULTURE STAIN Testing performed by:     AFB CULTURE STAIN [facility] [place]     AFB CULTURE STAIN [address] [facility]     [place] [address]     AFB CULTURE STAIN Dr. Osbaldo Sherwood MD    Blood culture [[id number]] Collected: 09/25/24 1111    Order Status: Completed Specimen: Blood from  Peripheral, Antecubital, Right Updated: 09/28/24 1432     Blood Culture, Routine No Growth to date      No Growth to date      No Growth to date      No Growth to date    Narrative:      [id number]    Blood culture [[id number]] Collected: 09/25/24 1111    Order Status: Completed Specimen: Blood from Peripheral, Forearm, Right Updated: 09/28/24 1432     Blood Culture, Routine No Growth to date      No Growth to date      No Growth to date      No Growth to date    Narrative:      [id number]    Culture, Anaerobe [[id number]] Collected: 09/25/24 1413    Order Status: Completed Specimen: Incision site from Abdomen Updated: 09/28/24 1405     Anaerobic Culture Culture in progress    Blood culture [[id number]] Collected: 09/24/24 1122    Order Status: Completed Specimen: Blood Updated: 09/28/24 1232     Blood Culture, Routine No Growth to date      No Growth to date      No Growth to date      No Growth to date      No Growth to date    Narrative:      Collection has been rescheduled by [email or web address] at 09/23/2024 14:35 Reason:   Patient unavailable dialysis   Collection has been rescheduled by MYB at 09/23/2024 18:44 Reason:   Unable to collect  Collection has been rescheduled by CZB at 09/23/2024 19:02 Reason:   Unable to collect  Collection has been rescheduled by RE1 at 09/24/2024 09:43 Reason:   Spoke to the patient's nurse about unable to collect she is   contacting Lambert and the doctor  Collection has been rescheduled by [email or web address] at 09/23/2024 14:35 Reason:   Patient unavailable dialysis   Collection has been rescheduled by MYB at 09/23/2024 18:44 Reason:   Unable to collect  Collection has been rescheduled by CZB at 09/23/2024 19:02 Reason:   Unable to collect  Collection has been rescheduled by RE1 at 09/24/2024 09:43 Reason:   Spoke to the patient's nurse about unable to collect she is   contacting Lambert and the doctor    IV catheter culture [[id number]] Collected: 09/25/24 1132    Order Status: Completed Specimen: Catheter Tip, Dialysis  Updated: 09/28/24 1113     Aerobic Culture - Cath tip No growth    Narrative:      Trialysis    Aerobic culture [[id number]]  (Abnormal)  (Susceptibility) Collected: 09/24/24 1535    Order Status: Completed Specimen: Incision site from Groin Updated: 09/28/24 0738     Aerobic Bacterial Culture ESCHERICHIA COLI  From broth only      Narrative:      Left groin incision site drainage swab    Aerobic culture [[id number]]  (Abnormal)  (Susceptibility) Collected: 09/25/24 1413    Order Status: Completed Specimen: Incision site from Abdomen Updated: 09/28/24 0737     Aerobic Bacterial Culture ESCHERICHIA COLI  From broth only      Gram stain [[id number]] Collected: 09/25/24 1413    Order Status: Completed Specimen: Incision site from Abdomen Updated: 09/27/24 1530     Gram Stain Result Moderate WBC's      No organisms seen    Fungus culture [[id number]] Collected: 09/25/24 1413    Order Status: Sent Specimen: Incision site from Abdomen Updated: 09/25/24 1652    Gram stain [[id number]] Collected: 09/24/24 1535    Order Status: Completed Specimen: Incision site from Groin Updated: 09/24/24 1829     Gram Stain Result Few WBC's      No organisms seen    Narrative:      Left groin incision site drainage    Blood culture x two cultures. Draw prior to antibiotics. [[id number]] Collected: 09/17/24 0949    Order Status: Completed Specimen: Blood from Peripheral, Antecubital, Right Updated: 09/22/24 1632     Blood Culture, Routine No growth after 5 days.    Narrative:      Aerobic and anaerobic    Blood culture x two cultures. Draw prior to antibiotics. [[id number]] Collected: 09/17/24 0949    Order Status: Completed Specimen: Blood from Peripheral, Antecubital, Right Updated: 09/22/24 1632     Blood Culture, Routine No growth after 5 days.    Narrative:      Aerobic and anaerobic    Urine culture [[id number]] Collected: 09/19/24 1435    Order Status: Completed Specimen: Urine Updated: 09/22/24 0740     Urine Culture, Routine No  growth    Narrative:      Specimen Source->Urine          X-Ray KUB  Narrative: EXAMINATION:  XR KUB    CLINICAL HISTORY:  Nausea with abdominal discomfort.  Recent laparotomy with colostomy;    COMPARISON:  09/26/2024 radiography    CT abdomen and pelvis 09/25/2024    FINDINGS:  Crescentic soft tissue gas involving left lateral abdominal wall, which correlates to findings on reference CT.  There is a percutaneous drain entering at the right lower quadrant, with tip terminating in the left hemipelvis.  Gas-filled mildly distended loops of small bowel in the left mid abdomen.  Rounded hyperdense material in the pelvis.  No acute osseous abnormality appreciated.  Impression: Localized gas involving the left lateral abdominal wall, corresponding to reference CT abdomen findings.    Gas-filled mildly distended loop of bowel in the left mid abdomen which could be on the basis of postoperative ileus or low-grade obstruction.    Indeterminate hyperdense object in the pelvis which could represent contrast in the rectum or urinary bladder.    Electronically signed by: Clark Joshi  Date:    09/28/2024  Time:    15:48        Significant Imaging: I have reviewed all pertinent imaging results/findings within the past 24 hours.    Assessment/Plan:      * Inguinal hernia of left side with gangrene  W/ Colonic perforation with abscess  CT abdomen shows Left inguinal hernia containing fat and air as well as marked inflammation extending from inside the pelvis, in the hernia and down into the left scrotum.   S/P Robotic sigmoid resection, Mobilization of splenic flexure, left inguinal canal exploration, and I&D 9/17 per Dr. Connors    Sepsis  This patient does have evidence of infective focus  My overall impression is sepsis.  Source: Abdominal  Antibiotics given-   Antibiotics (72h ago, onward)      Start     Stop Route Frequency Ordered    09/27/24 1015  linezolid 600 mg/300 mL IVPB 600 mg         -- IV Every 12 hours (non-standard  times) 09/27/24 0909    09/24/24 1800  piperacillin-tazobactam 4.5 g in dextrose 5 % 100 mL IVPB (ready to mix)         -- IV Every 12 hours (non-standard times) 09/24/24 1035        fluconazole (DIFLUCAN) IVPB 200 mg  Start Date/Time (Original Order): 09/22/24 1500   Ordered Dose: 200 mg Route: Intravenous Frequency: Every 24 hours (non-standard times)       Source control achieved by: IV antibiotics and surgical intervention  =======================================================  Blood culture 09/07/2024: NGTD   Urine culture 09/19/2024: NGTD   Blood culture 09/24/2024:  NGTD   Left groin drainage culture 09/24/2024:  E coli only resistant to tetracycline, cefazolin, ampicillin and ceftriaxone  Abdomen incision site culture 09/24/2024:  E coli only sensitive to ceftriaxone and tetracycline  =====================================================  WBC trending down   Infectious disease following   Continue current antibiotic regimen    Perforation of intestine  S/p open laparotomy with resection of affected colon and creation of colostomy 9/25  ID following: Continue IV Zyvox, IV Zosyn, and Diflucan  Diet advancement per surgery  Increase mobilization:  Out of bed to chair t.i.d. with meals  PT/OT following    Orchitis  Urology consulted: no surgical/invasive intervention recommended  S/P left inguinal canal exploration with Penrose drain placed in the inguinal canal 9/25.  Continue antibiotics per Infectious Disease    Hyperkalemia  Hyperkalemia is likely due to ESRD.The patients most recent potassium results are listed below.  Recent Labs     09/27/24  0359 09/28/24  0403   K 4.7 4.6       Plan  - Monitor for arrhythmias with EKG and/or continuous telemetry.   - Treat the hyperkalemia with Potassium Binders and hemodialysis .   - Monitor potassium: Daily  - The patient's hyperkalemia is stable    Acute hypoxic respiratory failure  Patient with Hypoxic Respiratory failure which is Acute.  he is not on home oxygen.  Supplemental oxygen was provided and noted- Oxygen Concentration (%):  [40] 40  Continue O2, weaning as tolerated.  Pulmonology following    ABG  Recent Labs   Lab 09/25/24  1051   PH 7.404   PO2 87   PCO2 37.7   HCO3 23.6*   BE -1       Dialysis AV fistula malfunction  Resolved. Vascular Surgery consulted-- S/P fistulogram with cephalic vein angioplasty 9/24 per Dr. Salvador    Essential hypertension  Chronic, controlled. Latest blood pressure and vitals reviewed-     Temp:  [97.9 °F (36.6 °C)-98.5 °F (36.9 °C)]   Pulse:  []   Resp:  [12-18]   BP: ()/(56-88)   SpO2:  [94 %-100 %] .   Home meds for hypertension were reviewed and noted below.   Hypertension Medications               cloNIDine (CATAPRES) 0.3 MG tablet TAKE 1 TABLET BY MOUTH THREE TIMES DAILY    hydrALAZINE (APRESOLINE) 100 MG tablet Take 1 tablet (100 mg total) by mouth 3 (three) times daily.    isosorbide mononitrate (IMDUR) 120 MG 24 hr tablet Take 120 mg by mouth once daily.    metoprolol succinate (TOPROL-XL) 50 MG 24 hr tablet Take 150 mg by mouth once daily.    olmesartan (BENICAR) 40 MG tablet Take 1 tablet by mouth once daily        While in the hospital, will manage blood pressure as follows; Continue current antihypertensive regimen: hydralazine, losartan, clonidine, and metoprolol.    Will utilize p.r.n. blood pressure medication only if patient's blood pressure greater than 180/110 and he develops symptoms such as worsening chest pain or shortness of breath.    Sleep apnea  Currently getting BiPAP nightly while in the hospital per pulmonology    Pulmonary hypertension  Chronic.  No need to address acutely.    Severe obesity with body mass index (BMI) of 35.0 to 39.9 with comorbidity  Body mass index is 37.93 kg/m². Morbid obesity complicates all aspects of disease management from diagnostic modalities to treatment. Weight loss encouraged and health benefits explained to patient.     Anemia due to chronic kidney disease, on  chronic dialysis  Anemia is likely due to acute blood loss which was from surgery and chronic disease due to ESRD. Most recent hemoglobin and hematocrit are listed below.  Recent Labs     09/27/24  0359 09/28/24  0403 09/29/24  1110   HGB 7.9* 8.5* 8.5*   HCT 25.3* 27.7* 27.4*     Plan  - Monitor serial CBC: Daily  - Transfuse PRBC if patient becomes hemodynamically unstable, symptomatic or H/H drops below 7/21.  - Patient has received 3 units of PRBCs  - Patient's anemia is currently stable    ESRD (end stage renal disease)  Creatine stable for now. BMP reviewed- noted Estimated Creatinine Clearance: 13.7 mL/min (A) (based on SCr of 10.1 mg/dL (H)). according to latest data. Based on current GFR, CKD stage is end stage.  Monitor UOP and serial BMP and adjust therapy as needed. Renally dose meds. Avoid nephrotoxic medications and procedures.  Nephrology following  Dialysis management per Nephrology  Renal diet      VTE Risk Mitigation (From admission, onward)           Ordered     heparin (porcine) injection 4,000 Units  As needed (PRN)         09/20/24 1544     heparin (porcine) injection 7,500 Units  Every 8 hours         09/17/24 1031     IP VTE HIGH RISK PATIENT  Once         09/17/24 1031     Place sequential compression device  Until discontinued         09/17/24 1031                    Discharge Planning   MARIA ISABEL: 10/1/2024     Code Status: Full Code   Is the patient medically ready for discharge?:     Reason for patient still in hospital (select all that apply): Patient trending condition and Treatment  Discharge Plan A: Rehab   Discharge Delays: None known at this time              Tessy Lozano NP  Department of Hospital Medicine   [facility]

## 2024-09-29 NOTE — ASSESSMENT & PLAN NOTE
Urology consulted: no surgical/invasive intervention recommended  S/P left inguinal canal exploration with Penrose drain placed in the inguinal canal 9/25.  Continue antibiotics per Infectious Disease

## 2024-09-29 NOTE — CARE UPDATE
09/29/24 0811   Patient Assessment/Suction   Level of Consciousness (AVPU) alert   Respiratory Effort Normal;Unlabored   Expansion/Accessory Muscles/Retractions no use of accessory muscles;no retractions;expansion symmetric   All Lung Fields Breath Sounds Anterior:   JUAN DANIEL Breath Sounds clear   RUL Breath Sounds clear   Rhythm/Pattern, Respiratory unlabored   Cough Frequency infrequent   Cough Type loose;good   Secretions Amount small   Secretions Color creamy   Secretions Characteristics thick   Skin Integrity   $ Wound Care Tech Time 15 min   Area Observed Bridge of nose   Skin Appearance without discoloration   PRE-TX-O2   Device (Oxygen Therapy) BIPAP   $ Is the patient on High Flow Oxygen? Yes   Oxygen Concentration (%) 40   SpO2 100 %   Pulse Oximetry Type Intermittent   $ Pulse Oximetry - Multiple Charge Pulse Oximetry - Multiple   Pulse 60   Resp 12   Ready to Wean/Extubation Screen   FIO2<=50 (chart decimal) 0.4   Preset CPAP/BiPAP Settings   Mode Of Delivery BiPAP   $ CPAP/BiPAP Daily Charge BiPAP/CPAP Daily   $ Initial CPAP/BiPAP Setup? No   $ Is patient using? Yes   Size of Mask Large   Sized Appropriately? Yes   Equipment Type V60   Airway Device Type large full face mask   Humidifier not applicable   Ipap 18   EPAP (cm H2O) 10   Pressure Support (cm H2O) 8   Set Rate (Breaths/Min) 12   ITime (sec) 1   Rise Time (sec) 3   Patient CPAP/BiPAP Settings   CPAP/BIPAP ID 10   RR Total (Breaths/Min) 12   Tidal Volume (mL) 529   VE Minute Ventilation (L/min) 6.4 L/min   Peak Inspiratory Pressure (cm H2O) 18   TiTOT (%) 23   Total Leak (L/Min) 32   Patient Trigger - ST Mode Only (%) 49   CPAP/BiPAP Backup Settings   IPAP Backup 18 cmH2O   EPAP Backup 10 cmH2O   Backup Rate 12 breaths per minute (bpm)   Rise Time Backup 3 seconds   CPAP/BiPAP Alarms   High Pressure (cm H2O) 40   Low Pressure (cm H2O) 5   Minute Ventilation (L/Min) 3   High RR (breaths/min) 45   Low RR (breaths/min) 8   Apnea (Sec) 20   Education    $ Education 15 min;BiPAP

## 2024-09-29 NOTE — PT/OT/SLP PROGRESS
"Physical Therapy Treatment    Patient Name:  João Ham   MRN:  [id number]    Recommendations:     Discharge Recommendations: High Intensity Therapy  Discharge Equipment Recommendations: to be determined by next level of care  Barriers to discharge:  weakness, impaired self care skills, impaired functional mobility, impaired balance, impaired cognition, impaired activity tolerance.     Assessment:     João Ham is a 44 y.o. male admitted with a medical diagnosis of Inguinal hernia of left side with gangrene.  He presents with the following impairments/functional limitations: weakness, impaired self care skills, impaired functional mobility, gait instability, impaired balance, impaired cognition, decreased lower extremity function, pain, impaired skin, impaired cardiopulmonary response to activity.    Pt found resting with HOB elevated, eyes closed but easily awakes with calling name. Pt agreeable and excited to participate in skilled physical therapy session. He stated "yes! I want to get out of this bed."    Pt required modA to transfer from supine to sitting EOB. He required modA to scoot to place feet flat on the floor. Billy x2 with HHA required to perform sit to stand. Billy x2 given to perform standing pivot transfer to bed side chair. Verbal cues given for sequencing and increased safety.     Verbal cues required to reach back with Ue's to perform slow controlled decent. Pt left sitting up in chair, chair alarm on, call light within reach, all needs met, and RN notified.     Rehab Prognosis: Fair; patient would benefit from acute skilled PT services to address these deficits and reach maximum level of function.    Recent Surgery: Procedure(s) (LRB):  LAPAROTOMY, EXPLORATORY (N/A)  COLON RESECTION  MOBILIZATION, SPLENIC FLEXURE 4 Days Post-Op    Plan:     During this hospitalization, patient to be seen daily to address the identified rehab impairments via therapeutic activities, therapeutic exercises, gait " training and progress toward the following goals:    Plan of Care Expires:  10/26/24    Subjective     Chief Complaint: none  Patient/Family Comments/goals: to get better  Pain/Comfort:  Pain Rating 1: 0/10      Objective:     Communicated with RN prior to session.  Patient found up in chair with telemetry, peripheral IV, oxygen, arterial line, BERNABE drain, pulse ox (continuous), pressure relief boots upon PT entry to room.     General Precautions: Standard, fall  Orthopedic Precautions:    Braces:    Respiratory Status: Room air     Functional Mobility:  Bed Mobility:     Supine to Sit: moderate assistance  Transfers:     Sit to Stand:  minimum assistance and of 2 persons with hand-held assist  Bed to Chair: minimum assistance and of 2 persons with  hand-held assist  using  Stand Pivot      AM-PAC 6 CLICK MOBILITY          Treatment & Education:  Pt educated on importance of time OOB, importance of intermittent mobility, safe techniques for transfers/ambulation, discharge recommendations/options, and use of call light for assistance and fall prevention.      Patient left up in chair with all lines intact, call button in reach, chair alarm on, and RN notified..    GOALS:   Multidisciplinary Problems       Physical Therapy Goals          Problem: Physical Therapy    Goal Priority Disciplines Outcome Interventions   Physical Therapy Goal     PT, PT/OT Progressing    Description: 1. Supine to sit with Stand-by Assistance  2. Sit to stand transfer with Stand-by Assistance  3.. Bed to chair transfer with Supervision using Rolling Walker  4. Gait  x 100 feet with Minimal Assistance using least restrictive assistive device.                          Time Tracking:     PT Received On: 09/29/24  PT Start Time: 1014     PT Stop Time: 1031  PT Total Time (min): 17 min     Billable Minutes: Therapeutic Activity 17    Treatment Type: Treatment  PT/PTA: PTA     Number of PTA visits since last PT visit: 2     09/29/2024

## 2024-09-29 NOTE — ASSESSMENT & PLAN NOTE
Hyperkalemia is likely due to ESRD.The patients most recent potassium results are listed below.  Recent Labs     09/27/24  0359 09/28/24  0403   K 4.7 4.6       Plan  - Monitor for arrhythmias with EKG and/or continuous telemetry.   - Treat the hyperkalemia with Potassium Binders and hemodialysis .   - Monitor potassium: Daily  - The patient's hyperkalemia is stable

## 2024-09-29 NOTE — ASSESSMENT & PLAN NOTE
Anemia is likely due to acute blood loss which was from surgery and chronic disease due to ESRD. Most recent hemoglobin and hematocrit are listed below.  Recent Labs     09/27/24  0359 09/28/24  0403 09/29/24  1110   HGB 7.9* 8.5* 8.5*   HCT 25.3* 27.7* 27.4*     Plan  - Monitor serial CBC: Daily  - Transfuse PRBC if patient becomes hemodynamically unstable, symptomatic or H/H drops below 7/21.  - Patient has received 3 units of PRBCs  - Patient's anemia is currently stable

## 2024-09-29 NOTE — ASSESSMENT & PLAN NOTE
This patient does have evidence of infective focus  My overall impression is sepsis.  Source: Abdominal  Antibiotics given-   Antibiotics (72h ago, onward)      Start     Stop Route Frequency Ordered    09/27/24 1015  linezolid 600 mg/300 mL IVPB 600 mg         -- IV Every 12 hours (non-standard times) 09/27/24 0909    09/24/24 1800  piperacillin-tazobactam 4.5 g in dextrose 5 % 100 mL IVPB (ready to mix)         -- IV Every 12 hours (non-standard times) 09/24/24 1035        fluconazole (DIFLUCAN) IVPB 200 mg  Start Date/Time (Original Order): 09/22/24 1500   Ordered Dose: 200 mg Route: Intravenous Frequency: Every 24 hours (non-standard times)       Source control achieved by: IV antibiotics and surgical intervention  =======================================================  Blood culture 09/07/2024: NGTD   Urine culture 09/19/2024: NGTD   Blood culture 09/24/2024:  NGTD   Left groin drainage culture 09/24/2024:  E coli only resistant to tetracycline, cefazolin, ampicillin and ceftriaxone  Abdomen incision site culture 09/24/2024:  E coli only sensitive to ceftriaxone and tetracycline  =====================================================  WBC trending down   Infectious disease following   Continue current antibiotic regimen

## 2024-09-29 NOTE — ASSESSMENT & PLAN NOTE
Patient with Hypoxic Respiratory failure which is Acute.  he is not on home oxygen. Supplemental oxygen was provided and noted- Oxygen Concentration (%):  [40] 40  Continue O2, weaning as tolerated.  Pulmonology following    ABG  Recent Labs   Lab 09/25/24  1051   PH 7.404   PO2 87   PCO2 37.7   HCO3 23.6*   BE -1

## 2024-09-29 NOTE — SUBJECTIVE & OBJECTIVE
Interval History:  Lying in bed, awake and alert.  Reports nausea better today.  Still on clear liquid diet currently.  Pain wanes and waxes.  Pain medication helps.  Endorses his colostomy was emptying this morning in his working.  WBC trending down.  CMP/Mg level are pending.  Wants to start getting out of bed.    Review of Systems   Constitutional:  Positive for activity change, appetite change and fatigue.   Gastrointestinal:  Positive for abdominal pain (Surgical) and nausea (Improving).   Genitourinary:         Dialysis.  Essentially anuric.   All other systems reviewed and are negative.    Objective:     Vital Signs (Most Recent):  Temp: 98.2 °F (36.8 °C) (09/29/24 0407)  Pulse: 89 (09/29/24 0407)  Resp: 17 (09/29/24 0407)  BP: 128/62 (09/29/24 0407)  SpO2: 99 % (09/29/24 0421) Vital Signs (24h Range):  Temp:  [98 °F (36.7 °C)-98.5 °F (36.9 °C)] 98.2 °F (36.8 °C)  Pulse:  [] 89  Resp:  [16-18] 17  SpO2:  [95 %-99 %] 99 %  BP: (110-128)/(62-82) 128/62     Weight: 134 kg (295 lb 6.7 oz)  Body mass index is 37.93 kg/m².    Intake/Output Summary (Last 24 hours) at 9/29/2024 0722  Last data filed at 9/29/2024 0608  Gross per 24 hour   Intake 830 ml   Output 225 ml   Net 605 ml         Physical Exam  Constitutional:       General: He is not in acute distress.     Appearance: He is obese.   Eyes:      Extraocular Movements: Extraocular movements intact.      Conjunctiva/sclera: Conjunctivae normal.      Pupils: Pupils are equal, round, and reactive to light.   Cardiovascular:      Rate and Rhythm: Normal rate and regular rhythm.      Heart sounds: Normal heart sounds.   Pulmonary:      Effort: Pulmonary effort is normal. No respiratory distress.      Breath sounds: Normal breath sounds.   Abdominal:      General: There is distension (Mild).      Tenderness: There is generalized abdominal tenderness (postop pain).      Comments: Colostomy intact to LLQ, stoma pink and moist   Musculoskeletal:         General:  No swelling. Normal range of motion.      Cervical back: Normal range of motion and neck supple.   Skin:     General: Skin is warm and dry.      Capillary Refill: Capillary refill takes less than 2 seconds.      Comments: Midline abdominal surgical dressing CDI.  BERNABE drain intact with serosanguineous drainage.  Left groin dressing CDI with a Penrose.   Neurological:      General: No focal deficit present.      Mental Status: He is alert and oriented to person, place, and time.      Motor: Weakness (Generalized) present.             Significant Labs: All pertinent labs within the past 24 hours have been reviewed.  CBC:   Recent Labs   Lab 09/28/24  0403 09/29/24  1110   WBC 24.45* 19.88*   HGB 8.5* 8.5*   HCT 27.7* 27.4*   * 560*     CMP:   Recent Labs   Lab 09/28/24  0403      K 4.6   CL 97   CO2 23   *   BUN 37*   CREATININE 10.1*   CALCIUM 9.7   PROT 8.3   ALBUMIN 3.2*   BILITOT 0.5   ALKPHOS 133   AST 54*   ALT 33   ANIONGAP 17*     Magnesium:   Recent Labs   Lab 09/28/24  0403   MG 2.7*     Microbiology Results (last 7 days)       Procedure Component Value Units Date/Time    Blood culture [[id number]] Collected: 09/24/24 1513    Order Status: Completed Specimen: Blood Updated: 09/28/24 1632     Blood Culture, Routine No Growth to date      No Growth to date      No Growth to date      No Growth to date      No Growth to date    Narrative:      Collection has been rescheduled by [email or web address] at 09/23/2024 14:35 Reason:   Patient unavailable dialysis   Collection has been rescheduled by MYB at 09/23/2024 18:44 Reason:   Unable to collect  Collection has been rescheduled by CZB at 09/23/2024 19:02 Reason:   Unable to collect  Collection has been rescheduled by RE1 at 09/24/2024 09:43 Reason:   Spoke to the patient's nurse about unable to collect she is   contacting [facility] and the doctor  Collection has been rescheduled by [email or web address] at 09/23/2024 14:35 Reason:   Patient unavailable dialysis   Collection has been  rescheduled by MYB at 09/23/2024 18:44 Reason:   Unable to collect  Collection has been rescheduled by CZB at 09/23/2024 19:02 Reason:   Unable to collect  Collection has been rescheduled by RE1 at 09/24/2024 09:43 Reason:   Spoke to the patient's nurse about unable to collect she is   contacting [facility] and the doctor    AFB Culture & Smear [[id number]] Collected: 09/25/24 1413    Order Status: Completed Specimen: Incision site from Abdomen Updated: 09/28/24 1630     AFB CULTURE STAIN No acid fast bacilli seen.     AFB CULTURE STAIN Testing performed by:     [facility] CULTURE STAIN [facility] [place]     [facility] [address] [facility]     [place] [address]     AFB CULTURE STAIN Dr. Osbaldo Sherwood MD    Blood culture [[id number]] Collected: 09/25/24 1111    Order Status: Completed Specimen: Blood from Peripheral, Antecubital, Right Updated: 09/28/24 1432     Blood Culture, Routine No Growth to date      No Growth to date      No Growth to date      No Growth to date    Narrative:      [id number]    Blood culture [[id number]] Collected: 09/25/24 1111    Order Status: Completed Specimen: Blood from Peripheral, Forearm, Right Updated: 09/28/24 1432     Blood Culture, Routine No Growth to date      No Growth to date      No Growth to date      No Growth to date    Narrative:      [id number]    Culture, Anaerobe [[id number]] Collected: 09/25/24 1413    Order Status: Completed Specimen: Incision site from Abdomen Updated: 09/28/24 1405     Anaerobic Culture Culture in progress    Blood culture [[id number]] Collected: 09/24/24 1122    Order Status: Completed Specimen: Blood Updated: 09/28/24 1232     Blood Culture, Routine No Growth to date      No Growth to date      No Growth to date      No Growth to date      No Growth to date    Narrative:      Collection has been rescheduled by [email or web address] at 09/23/2024 14:35 Reason:   Patient unavailable dialysis   Collection has been rescheduled by MYB at 09/23/2024 18:44  Reason:   Unable to collect  Collection has been rescheduled by CZB at 09/23/2024 19:02 Reason:   Unable to collect  Collection has been rescheduled by RE1 at 09/24/2024 09:43 Reason:   Spoke to the patient's nurse about unable to collect she is   contacting [facility] and the doctor  Collection has been rescheduled by [email or web address] at 09/23/2024 14:35 Reason:   Patient unavailable dialysis   Collection has been rescheduled by MYB at 09/23/2024 18:44 Reason:   Unable to collect  Collection has been rescheduled by CZB at 09/23/2024 19:02 Reason:   Unable to collect  Collection has been rescheduled by RE1 at 09/24/2024 09:43 Reason:   Spoke to the patient's nurse about unable to collect she is   contacting [facility] and the doctor    IV catheter culture [[id number]] Collected: 09/25/24 1132    Order Status: Completed Specimen: Catheter Tip, Dialysis Updated: 09/28/24 1113     Aerobic Culture - Cath tip No growth    Narrative:      Trialysis    Aerobic culture [[id number]]  (Abnormal)  (Susceptibility) Collected: 09/24/24 1535    Order Status: Completed Specimen: Incision site from Groin Updated: 09/28/24 0738     Aerobic Bacterial Culture ESCHERICHIA COLI  From broth only      Narrative:      Left groin incision site drainage swab    Aerobic culture [[id number]]  (Abnormal)  (Susceptibility) Collected: 09/25/24 1413    Order Status: Completed Specimen: Incision site from Abdomen Updated: 09/28/24 0737     Aerobic Bacterial Culture ESCHERICHIA COLI  From broth only      Gram stain [[id number]] Collected: 09/25/24 1413    Order Status: Completed Specimen: Incision site from Abdomen Updated: 09/27/24 1530     Gram Stain Result Moderate WBC's      No organisms seen    Fungus culture [[id number]] Collected: 09/25/24 1413    Order Status: Sent Specimen: Incision site from Abdomen Updated: 09/25/24 1652    Gram stain [[id number]] Collected: 09/24/24 1535    Order Status: Completed Specimen: Incision site from Groin Updated: 09/24/24 1829      Gram Stain Result Few WBC's      No organisms seen    Narrative:      Left groin incision site drainage    Blood culture x two cultures. Draw prior to antibiotics. [[id number]] Collected: 09/17/24 0949    Order Status: Completed Specimen: Blood from Peripheral, Antecubital, Right Updated: 09/22/24 1632     Blood Culture, Routine No growth after 5 days.    Narrative:      Aerobic and anaerobic    Blood culture x two cultures. Draw prior to antibiotics. [[id number]] Collected: 09/17/24 0949    Order Status: Completed Specimen: Blood from Peripheral, Antecubital, Right Updated: 09/22/24 1632     Blood Culture, Routine No growth after 5 days.    Narrative:      Aerobic and anaerobic    Urine culture [[id number]] Collected: 09/19/24 1435    Order Status: Completed Specimen: Urine Updated: 09/22/24 0740     Urine Culture, Routine No growth    Narrative:      Specimen Source->Urine          X-Ray KUB  Narrative: EXAMINATION:  XR KUB    CLINICAL HISTORY:  Nausea with abdominal discomfort.  Recent laparotomy with colostomy;    COMPARISON:  09/26/2024 radiography    CT abdomen and pelvis 09/25/2024    FINDINGS:  Crescentic soft tissue gas involving left lateral abdominal wall, which correlates to findings on reference CT.  There is a percutaneous drain entering at the right lower quadrant, with tip terminating in the left hemipelvis.  Gas-filled mildly distended loops of small bowel in the left mid abdomen.  Rounded hyperdense material in the pelvis.  No acute osseous abnormality appreciated.  Impression: Localized gas involving the left lateral abdominal wall, corresponding to reference CT abdomen findings.    Gas-filled mildly distended loop of bowel in the left mid abdomen which could be on the basis of postoperative ileus or low-grade obstruction.    Indeterminate hyperdense object in the pelvis which could represent contrast in the rectum or urinary bladder.    Electronically signed by: Clark  Adi  Date:    09/28/2024  Time:    15:48        Significant Imaging: I have reviewed all pertinent imaging results/findings within the past 24 hours.

## 2024-09-29 NOTE — ASSESSMENT & PLAN NOTE
Body mass index is 37.93 kg/m². Morbid obesity complicates all aspects of disease management from diagnostic modalities to treatment. Weight loss encouraged and health benefits explained to patient.

## 2024-09-30 PROBLEM — R53.81 DEBILITY: Chronic | Status: ACTIVE | Noted: 2024-09-30

## 2024-09-30 PROBLEM — T82.590A DIALYSIS AV FISTULA MALFUNCTION: Status: RESOLVED | Noted: 2024-09-19 | Resolved: 2024-09-30

## 2024-09-30 PROBLEM — R53.81 DEBILITY: Status: ACTIVE | Noted: 2024-09-30

## 2024-09-30 LAB
ALBUMIN SERPL BCP-MCNC: 3 G/DL (ref 3.5–5.2)
ALP SERPL-CCNC: 114 U/L (ref 55–135)
ALT SERPL W/O P-5'-P-CCNC: 38 U/L (ref 10–44)
ANION GAP SERPL CALC-SCNC: 18 MMOL/L (ref 8–16)
ANISOCYTOSIS BLD QL SMEAR: ABNORMAL
AST SERPL-CCNC: 43 U/L (ref 10–40)
BACTERIA BLD CULT: NORMAL
BACTERIA BLD CULT: NORMAL
BASOPHILS NFR BLD: 0 % (ref 0–1.9)
BILIRUB SERPL-MCNC: 0.4 MG/DL (ref 0.1–1)
BUN SERPL-MCNC: 63 MG/DL (ref 6–20)
CALCIUM SERPL-MCNC: 8.7 MG/DL (ref 8.7–10.5)
CHLORIDE SERPL-SCNC: 94 MMOL/L (ref 95–110)
CO2 SERPL-SCNC: 22 MMOL/L (ref 23–29)
CREAT SERPL-MCNC: 15.5 MG/DL (ref 0.5–1.4)
DIFFERENTIAL METHOD BLD: ABNORMAL
EOSINOPHIL NFR BLD: 2 % (ref 0–8)
ERYTHROCYTE [DISTWIDTH] IN BLOOD BY AUTOMATED COUNT: 21.5 % (ref 11.5–14.5)
EST. GFR  (NO RACE VARIABLE): 3.6 ML/MIN/1.73 M^2
GLUCOSE SERPL-MCNC: 105 MG/DL (ref 70–110)
HCT VFR BLD AUTO: 24.3 % (ref 40–54)
HGB BLD-MCNC: 7.4 G/DL (ref 14–18)
HYPOCHROMIA BLD QL SMEAR: ABNORMAL
IMM GRANULOCYTES # BLD AUTO: ABNORMAL K/UL (ref 0–0.04)
IMM GRANULOCYTES NFR BLD AUTO: ABNORMAL % (ref 0–0.5)
LYMPHOCYTES NFR BLD: 10 % (ref 18–48)
MAGNESIUM SERPL-MCNC: 2.5 MG/DL (ref 1.6–2.6)
MCH RBC QN AUTO: 29.5 PG (ref 27–31)
MCHC RBC AUTO-ENTMCNC: 30.5 G/DL (ref 32–36)
MCV RBC AUTO: 97 FL (ref 82–98)
METAMYELOCYTES NFR BLD MANUAL: 2 %
MONOCYTES NFR BLD: 11 % (ref 4–15)
NEUTROPHILS NFR BLD: 62 % (ref 38–73)
NEUTS BAND NFR BLD MANUAL: 13 %
NRBC BLD-RTO: 1 /100 WBC
PLATELET # BLD AUTO: 482 K/UL (ref 150–450)
PLATELET BLD QL SMEAR: ABNORMAL
PMV BLD AUTO: 10.2 FL (ref 9.2–12.9)
POCT GLUCOSE: 111 MG/DL (ref 70–110)
POTASSIUM SERPL-SCNC: 4 MMOL/L (ref 3.5–5.1)
PROT SERPL-MCNC: 7.8 G/DL (ref 6–8.4)
RBC # BLD AUTO: 2.51 M/UL (ref 4.6–6.2)
SODIUM SERPL-SCNC: 134 MMOL/L (ref 136–145)
WBC # BLD AUTO: 15.43 K/UL (ref 3.9–12.7)

## 2024-09-30 PROCEDURE — 83735 ASSAY OF MAGNESIUM: CPT | Performed by: SURGERY

## 2024-09-30 PROCEDURE — 63600175 PHARM REV CODE 636 W HCPCS: Mod: JZ,JG | Performed by: SURGERY

## 2024-09-30 PROCEDURE — 63600175 PHARM REV CODE 636 W HCPCS: Performed by: SURGERY

## 2024-09-30 PROCEDURE — 63600175 PHARM REV CODE 636 W HCPCS: Performed by: HOSPITALIST

## 2024-09-30 PROCEDURE — 25000003 PHARM REV CODE 250: Performed by: SURGERY

## 2024-09-30 PROCEDURE — 63600175 PHARM REV CODE 636 W HCPCS: Performed by: INTERNAL MEDICINE

## 2024-09-30 PROCEDURE — 80053 COMPREHEN METABOLIC PANEL: CPT | Performed by: SURGERY

## 2024-09-30 PROCEDURE — 85027 COMPLETE CBC AUTOMATED: CPT | Performed by: SURGERY

## 2024-09-30 PROCEDURE — 94660 CPAP INITIATION&MGMT: CPT

## 2024-09-30 PROCEDURE — 25000003 PHARM REV CODE 250: Performed by: NURSE PRACTITIONER

## 2024-09-30 PROCEDURE — 99900035 HC TECH TIME PER 15 MIN (STAT)

## 2024-09-30 PROCEDURE — 94761 N-INVAS EAR/PLS OXIMETRY MLT: CPT

## 2024-09-30 PROCEDURE — 27000221 HC OXYGEN, UP TO 24 HOURS

## 2024-09-30 PROCEDURE — 99900031 HC PATIENT EDUCATION (STAT)

## 2024-09-30 PROCEDURE — 12000002 HC ACUTE/MED SURGE SEMI-PRIVATE ROOM

## 2024-09-30 PROCEDURE — 90935 HEMODIALYSIS ONE EVALUATION: CPT

## 2024-09-30 PROCEDURE — 85007 BL SMEAR W/DIFF WBC COUNT: CPT | Performed by: SURGERY

## 2024-09-30 PROCEDURE — 36415 COLL VENOUS BLD VENIPUNCTURE: CPT | Performed by: SURGERY

## 2024-09-30 RX ORDER — LIDOCAINE AND PRILOCAINE 25; 25 MG/G; MG/G
CREAM TOPICAL
Status: DISCONTINUED | OUTPATIENT
Start: 2024-09-30 | End: 2024-10-06 | Stop reason: HOSPADM

## 2024-09-30 RX ORDER — OXYCODONE AND ACETAMINOPHEN 7.5; 325 MG/1; MG/1
1 TABLET ORAL EVERY 4 HOURS PRN
Status: DISCONTINUED | OUTPATIENT
Start: 2024-09-30 | End: 2024-10-01

## 2024-09-30 RX ADMIN — LINEZOLID 600 MG: 600 INJECTION, SOLUTION INTRAVENOUS at 10:09

## 2024-09-30 RX ADMIN — HYDROMORPHONE HYDROCHLORIDE 2 MG: 1 INJECTION, SOLUTION INTRAMUSCULAR; INTRAVENOUS; SUBCUTANEOUS at 08:09

## 2024-09-30 RX ADMIN — FLUCONAZOLE 200 MG: 2 INJECTION, SOLUTION INTRAVENOUS at 09:09

## 2024-09-30 RX ADMIN — OXYCODONE HYDROCHLORIDE AND ACETAMINOPHEN 1 TABLET: 7.5; 325 TABLET ORAL at 05:09

## 2024-09-30 RX ADMIN — OXYCODONE HYDROCHLORIDE AND ACETAMINOPHEN 1 TABLET: 5; 325 TABLET ORAL at 08:09

## 2024-09-30 RX ADMIN — OXYCODONE HYDROCHLORIDE AND ACETAMINOPHEN 1 TABLET: 5; 325 TABLET ORAL at 04:09

## 2024-09-30 RX ADMIN — LINEZOLID 600 MG: 600 INJECTION, SOLUTION INTRAVENOUS at 09:09

## 2024-09-30 RX ADMIN — PIPERACILLIN SODIUM AND TAZOBACTAM SODIUM 4.5 G: 4; .5 INJECTION, POWDER, LYOPHILIZED, FOR SOLUTION INTRAVENOUS at 11:09

## 2024-09-30 RX ADMIN — GABAPENTIN 200 MG: 100 CAPSULE ORAL at 08:09

## 2024-09-30 RX ADMIN — EPOETIN ALFA-EPBX 10000 UNITS: 10000 INJECTION, SOLUTION INTRAVENOUS; SUBCUTANEOUS at 12:09

## 2024-09-30 RX ADMIN — HEPARIN SODIUM 7500 UNITS: 5000 INJECTION INTRAVENOUS; SUBCUTANEOUS at 05:09

## 2024-09-30 RX ADMIN — HEPARIN SODIUM 7500 UNITS: 5000 INJECTION INTRAVENOUS; SUBCUTANEOUS at 09:09

## 2024-09-30 NOTE — ASSESSMENT & PLAN NOTE
Hyperkalemia is likely due to ESRD.The patients most recent potassium results are listed below.  Recent Labs     09/28/24  0403 09/30/24  0627   K 4.6 4.0       Plan  - Monitor for arrhythmias with EKG and/or continuous telemetry.   - Treat the hyperkalemia with Potassium Binders and hemodialysis .   - Monitor potassium: Daily  - The patient's hyperkalemia is resolved

## 2024-09-30 NOTE — ASSESSMENT & PLAN NOTE
Resolved. Vascular Surgery consulted-- S/P fistulogram with cephalic vein angioplasty 9/24 per Dr. Salvador

## 2024-09-30 NOTE — ASSESSMENT & PLAN NOTE
Patient with Acute debility due to bed confinement status and prolonged hospitalization, and infection . Latest AMPAC and GEMS scores have not been reviewed. Plan includes progressive mobility protocol initated, PT/OT consulted, fall precautions in place, and CM following for discharge planning .

## 2024-09-30 NOTE — PROGRESS NOTES
INPATIENT NEPHROLOGY Progress  [facility] NEPHROLOGY    João Ham  09/30/2024    Reason for consultation:  ESRD    Chief Complaint:   Chief Complaint   Patient presents with    Fatigue    Groin Swelling     X 2 days.  Hx of Kidney failure and CHF     History of Present Illness:  Per H and P    Patient is a 44 year old male with history of ESDR on dialysis MWF, awaiting kidney transplant, HTN, presented to ED with 3 days history of left groin pain and swelling. States swelling comes every time he cough and is being painful. Patient has low grade fever 99s at home. He has been vomiting bilious fluid last 2-3 days. No diarrhea or abdominal pain.      In the ED CT abdomen showed Left inguinal hernia containing fat and air as well as marked inflammation extending from inside the pelvis, in the hernia and down into the left scrotum. This is consistent with an infected inguinal hernia, possible gangrene. WBC was 14, patient was tachycardic. General surgery was consulted and patient was admitted under hospitalist.     9/19  avf nonfunctional.   No sob or chest pain.  Has post op pain.  AFVSS  9/20  afvss.  Pt doesn't want to see previous vascular surgeon who put his avf in.  No alternative available.  Transfer center called.  Has temporary trialysis catheter.  Patient seen on hemodialysis for uremic clearance and ultrafiltration.    9/21  2.5L off w/HD yesterday.  Tmax 101.8, pressures ok.  Lab results reviewed, Hgb low but better than yesterday.  C/o post op pain, no other complaints.  9/22 POD 5.  VSS, lab results reviewed.  Hgb 7.6, added epo to HD orders.  C/o gas pains, plans to move around more today.  Next HD tomorrow.  9/23 VSS. HD today. Transfuse with next HD as needed if Hgb stil low.  9/24 VSS. Appreciate input from Urology, Gen Surgery, Vascular Surgery, ID on this complex patient. Plan for fistulogram tomorrow. CXR yesterday shows trialysis line tip in appropriate cocation. Will attempt HD today since we  skipped yesterday due to nurse and patient concerns that the line may be malpositioned.  9/25 VSS. s/p cephalic vein dilation by Dr. Robledo yesterday, tolerated HD very well. Hold HD today. Hypotension this AM, low grade fever yesterday, WBC elevated, received IVF bolus and Midodrine. Continues to be infected per Surgery, we can remove trialysis line now that AVF is treated and usable... Consider ICU. Stopped hydralazine and olmesartan, decreased Clonidine to 0.1 BID, not sure what to do with Metoprolol 150mg and Hydralazine 120mg... He may be sob/hypoxic due to anemia, suggest 1 PRBC instead of IVF, since Hgb is 7.    Addendum @ 10:47 AM  Seen at bedside in ICU with Dr. Rodriguez and Dr. Au. Mother present as well. Reviewed imaging. Agree with plan for urgent ex lap, remove central line and place mid line, keep current abx, hold on transfusion and dialysis and reassess later. ABG and fresh set of cx now, re-evalaute later.    Addendum @ 4:36 PM  Discussed briefly with Dr. Connors, dialysis nurse Brant and with ICU nurse. Patient had colostomy placed due to anastomotic leak and had 2 PRBC given. Upon return to floor was hypertensive with SBP > 200 and NPO. Advised Cleviprex drip for now (earlier to day he was on max dose Clonidine, mad dose Hydralazine, max dose Olmesartan, 150mg Metoprolol-XL and 120mg of Imdur - none of which he can get due to NPO) and will do urgent HD with UF 2L or so as tolerated.    9/26  POD 1 s/p . Exploratory laparotomy,. Colon resection end-colostomy, Mobilization of splenic flexure, and left inguinal canal exploration.  Post pain. No sob.  Sleepy.    9/27  AFVSS.  Some post op pain.  No sob.  No angina.   Patient seen on hemodialysis for uremic clearance and ultrafiltration.      9/28 s/p 3.8 liter uf yesterday.     AFVSS.  Post op pain.  Dry cough.  No sob.  Projectile vomiting earlier today per patient  9/30 VSS. Seen on HD today.    Plan of Care:    ESRD on HD MWF  --continue dialysis  per routine  --renal dose medication per routine  --supplemental dialysis done yesterday with 3 liter UF    Anemia of CKD  --erythropoiesis stimulating agent with renal replacement therapy  --transfuse 1 PRBC due to hypoxic respiratory failure    Secondary HPT  --renal diet  --continue binders with meals    Hypertension  Hypotension 2/2 sepsis--better  --uf with hd as needed  --continue sepsis therapy  --line can be remove  --gave blood, careful with IVF    Hyperkalemia  --2k dialysate  --low k diet  --better today    AVF malfunction fixed  --got temporary line, CXR on 9/23 confirms tip in typical location, unchanged.  --appreciate Vascular eval, s/p fistulogram and cephalic vein stenosis dilation on 9/23 by Dr. Polk.    Nausea/vomiting  --add phenergan as back up to zofran      Thank you for allowing us to participate in this patient's care. We will continue to follow.    Vital Signs:  Temp Readings from Last 3 Encounters:   09/30/24 98.6 °F (37 °C)   08/22/24 97.3 °F (36.3 °C) (Temporal)   07/30/24 98.6 °F (37 °C) (Oral)       Pulse Readings from Last 3 Encounters:   09/30/24 78   08/29/24 (!) 115   08/22/24 110       BP Readings from Last 3 Encounters:   09/30/24 (!) 151/74   08/29/24 99/68   08/22/24 (!) 137/91       Weight:  Wt Readings from Last 3 Encounters:   09/18/24 134 kg (295 lb 6.7 oz)   08/29/24 131.3 kg (289 lb 7.4 oz)   08/22/24 131 kg (288 lb 12.8 oz)       Past Medical & Surgical History:  Past Medical History:   Diagnosis Date    Allergy     Anemia     Anxiety     CHF (congestive heart failure)     Depression     High blood pressure with chronic kidney disease, stage 5 chronic kidney disease or end stage renal disease     Hypertension        Past Surgical History:   Procedure Laterality Date    ABSCESS DRAINAGE Left 9/17/2024    Procedure: DRAINAGE, ABSCESS, GROIN;  Surgeon: Galileo Connors MD;  Location: [facility];  Service: General;  Laterality: Left;    COLON RESECTION  9/25/2024     Procedure: COLON RESECTION;  Surgeon: Galileo Connors MD;  Location: [facility] OR;  Service: General;;    FISTULOGRAM Bilateral 4/30/2024    Procedure: Fistulogram;  Surgeon: Khoobehi, Ali, MD;  Location: [facility] CATH/EP LAB;  Service: Vascular;  Laterality: Bilateral;    FISTULOGRAM Left 9/24/2024    Procedure: Fistulogram;  Surgeon: Lorraine Salvador MD;  Location: [facility] CATH/EP LAB;  Service: General;  Laterality: Left;    HERNIA REPAIR Right     With mesh    INSERTION, CATHETER, TUNNELED N/A 6/22/2023    Procedure: Insertion,catheter,tunneled;  Surgeon: Everett Caicedo MD;  Location: [facility] OR;  Service: General;  Laterality: N/A;    LAPAROTOMY, EXPLORATORY N/A 9/25/2024    Procedure: LAPAROTOMY, EXPLORATORY;  Surgeon: Galileo Connors MD;  Location: [facility] OR;  Service: General;  Laterality: N/A;    MOBILIZATION OF SPLENIC FLEXURE  9/25/2024    Procedure: MOBILIZATION, SPLENIC FLEXURE;  Surgeon: Galileo Connors MD;  Location: [facility] OR;  Service: General;;    PERCUTANEOUS TRANSLUMINAL ANGIOPLASTY OF ARTERIOVENOUS FISTULA Left 4/30/2024    Procedure: PTA, AV FISTULA;  Surgeon: Khoobehi, Ali, MD;  Location: [facility] CATH/EP LAB;  Service: Vascular;  Laterality: Left;    PERCUTANEOUS TRANSLUMINAL ANGIOPLASTY OF ARTERIOVENOUS FISTULA Left 9/24/2024    Procedure: PTA, AV FISTULA;  Surgeon: Lorraine Salvador MD;  Location: [facility] CATH/EP LAB;  Service: General;  Laterality: Left;    PHLEBOGRAPHY N/A 7/19/2024    Procedure: Venogram;  Surgeon: Khoobehi, Ali, MD;  Location: [facility] CATH/EP LAB;  Service: Vascular;  Laterality: N/A;    REMOVAL, TUNNELED CATH Right 7/19/2024    Procedure: REMOVAL, TUNNELED CATH;  Surgeon: Khoobehi, Ali, MD;  Location: [facility] CATH/EP LAB;  Service: Vascular;  Laterality: Right;    ROBOT-ASSISTED LAPAROSCOPIC RESECTION OF SIGMOID COLON USING DA DELANO XI N/A 9/17/2024    Procedure: XI ROBOTIC SIGMOID RESECTION, WITH ANASTAMOSIS;  Surgeon: Galileo Connors MD;  Location: [facility];  Service: General;  Laterality:  N/A;  possible open have instruments available       Past Social History:  Social History     Socioeconomic History    Marital status: Single   Tobacco Use    Smoking status: Never     Passive exposure: Never    Smokeless tobacco: Never   Substance and Sexual Activity    Alcohol use: Never    Drug use: Never    Sexual activity: Not Currently   Social History Narrative    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     Social Drivers of Health     Financial Resource Strain: Low Risk  (9/18/2024)    Overall Financial Resource Strain (CARDIA)     Difficulty of Paying Living Expenses: Not very hard   Food Insecurity: No Food Insecurity (9/18/2024)    Hunger Vital Sign     Worried About Running Out of Food in the Last Year: Never true     Ran Out of Food in the Last Year: Never true   Transportation Needs: No Transportation Needs (9/18/2024)    TRANSPORTATION NEEDS     Transportation : No   Physical Activity: Sufficiently Active (1/3/2023)    Exercise Vital Sign     Days of Exercise per Week: 6 days     Minutes of Exercise per Session: 60 min   Recent Concern: Physical Activity - Insufficiently Active (10/11/2022)    Exercise Vital Sign     Days of Exercise per Week: 3 days     Minutes of Exercise per Session: 30 min   Stress: Stress Concern Present (9/18/2024)    [place] [employer] - Occupational Stress Questionnaire     Feeling of Stress : Very much   Housing Stability: Low Risk  (9/18/2024)    Housing Stability Vital Sign     Unable to Pay for Housing in the Last Year: No     Homeless in the Last Year: No       Medications:  No current facility-administered medications on file prior to encounter.     Current Outpatient Medications on File Prior to Encounter   Medication Sig Dispense Refill    apixaban (ELIQUIS) 2.5 mg Tab Take 2.5 mg by mouth 2 (two) times daily.      calcitRIOL (ROCALTROL) 0.25 MCG Cap Take 1 capsule by mouth once daily (Patient taking differently: Take 0.25 mcg by mouth once daily.) 90 capsule 1     cholecalciferol, vitamin D3, 125 mcg (5,000 unit) Tab Take 1 tablet by mouth once daily.      cloNIDine (CATAPRES) 0.3 MG tablet TAKE 1 TABLET BY MOUTH THREE TIMES DAILY 270 tablet 2    isosorbide mononitrate (IMDUR) 120 MG 24 hr tablet Take 120 mg by mouth once daily.      metoprolol succinate (TOPROL-XL) 50 MG 24 hr tablet Take 150 mg by mouth once daily.      olmesartan (BENICAR) 40 MG tablet Take 1 tablet by mouth once daily 90 tablet 0    sevelamer carbonate (RENVELA) 800 mg Tab Take 2 tablets (1,600 mg total) by mouth 3 (three) times daily with meals. 180 tablet 11    calcium carbonate (TUMS) 200 mg calcium (500 mg) chewable tablet Take 2 tablets (1,000 mg total) by mouth 3 (three) times daily with meals. (Patient taking differently: Take 1,000 mg by mouth 3 (three) times daily.) 180 tablet 11    hydrALAZINE (APRESOLINE) 100 MG tablet Take 1 tablet (100 mg total) by mouth 3 (three) times daily. 90 tablet 11     Scheduled Meds:   cloNIDine  0.3 mg Oral TID    epoetin mily-epbx  10,000 Units Intravenous Every Mon, Wed, Fri    fluconazole (DIFLUCAN) IV (PEDS and ADULTS)  200 mg Intravenous Q24H    gabapentin  200 mg Oral BID    heparin (porcine)  7,500 Units Subcutaneous Q8H    hydrALAZINE  100 mg Oral Q8H    HYDROmorphone  1 mg Intravenous Once    isosorbide mononitrate  30 mg Oral Daily    LIDOcaine-prilocaine   Topical (Top) Once    linezolid  600 mg Intravenous Q12H    losartan  100 mg Oral Daily    metoprolol succinate  150 mg Oral Daily    piperacillin-tazobactam (Zosyn) IV (PEDS and ADULTS) (extended infusion is not appropriate)  4.5 g Intravenous Q12H     Continuous Infusions:        PRN Meds:.  Current Facility-Administered Medications:     0.9% NaCl, , Intravenous, PRN    acetaminophen, 650 mg, Oral, Q4H PRN    aluminum-magnesium hydroxide-simethicone, 30 mL, Oral, QID PRN    dextrose 10%, 12.5 g, Intravenous, PRN    dextrose 10%, 12.5 g, Intravenous, PRN    dextrose 10%, 12.5 g, Intravenous, PRN     "dextrose 10%, 25 g, Intravenous, PRN    dextrose 10%, 25 g, Intravenous, PRN    dextrose 10%, 25 g, Intravenous, PRN    glucagon (human recombinant), 1 mg, Intramuscular, PRN    glucose, 16 g, Oral, PRN    glucose, 24 g, Oral, PRN    heparin (porcine), 4,000 Units, Intravenous, PRN    HYDROmorphone, 2 mg, Intravenous, Q3H PRN    insulin aspart U-100, 0-5 Units, Subcutaneous, QID (AC + HS) PRN    iohexoL, 100 mL, Intravenous, ONCE PRN    LIDOcaine-prilocaine, , Topical (Top), PRN    magnesium oxide, 800 mg, Oral, PRN    magnesium oxide, 800 mg, Oral, PRN    melatonin, 6 mg, Oral, Nightly PRN    naloxone, 0.02 mg, Intravenous, PRN    ondansetron, 4 mg, Intravenous, Q6H PRN    oxyCODONE-acetaminophen, 1 tablet, Oral, Q4H PRN    potassium bicarbonate, 35 mEq, Oral, PRN    potassium bicarbonate, 50 mEq, Oral, PRN    potassium bicarbonate, 60 mEq, Oral, PRN    potassium, sodium phosphates, 2 packet, Oral, PRN    potassium, sodium phosphates, 2 packet, Oral, PRN    potassium, sodium phosphates, 2 packet, Oral, PRN    promethazine (PHENERGAN) 12.5 mg in 0.9% NaCl 50 mL IVPB, 12.5 mg, Intravenous, Q6H PRN    sodium chloride 0.9%, 250 mL, Intravenous, PRN    sodium chloride 0.9%, 3 mL, Intravenous, Q12H PRN    Allergies:  Mushroom    Past Family History:  Reviewed; refer to Hospitalist Admission Note    Review of Systems:  Review of Systems - All 14 systems reviewed and negative, except as noted in HPI    Physical Exam:    BP (!) 151/74   Pulse 78   Temp 98.6 °F (37 °C)   Resp 18   Ht 6' 2" (1.88 m)   Wt 134 kg (295 lb 6.7 oz)   SpO2 98%   BMI 37.93 kg/m²     General Appearance:    Alert, cooperative, no distress, appears stated age   Head:    Normocephalic, without obvious abnormality, atraumatic   Eyes:    PER, conjunctiva/corneas clear, EOM's intact in both eyes        Throat:   Lips, mucosa, and tongue normal; teeth and gums normal   Back:     Symmetric, no curvature, ROM normal, no CVA tenderness   Lungs:     " Clear to auscultation bilaterally, respirations unlabored   Chest wall:    No tenderness or deformity   Heart:    Regular rate and rhythm, S1 and S2 normal, no murmur, rub   or gallop   Abdomen:     Soft, non-tender, bowel sounds active all four quadrants,     no masses, no organomegaly   Extremities:   Extremities normal, atraumatic, no cyanosis or edema   Pulses:   2+ and symmetric all extremities   MSK:   No joint or muscle swelling, tenderness or deformity   Skin:   Skin color, texture, turgor normal, no rashes or lesions   Neurologic:   CNII-XII intact, normal strength and sensation       Throughout.  No flap     Results:  Recent Labs   Lab 09/27/24  0359 09/28/24  0403 09/30/24  0627    137 134*   K 4.7 4.6 4.0   CL 97 97 94*   CO2 24 23 22*   BUN 33* 37* 63*   CREATININE 10.3* 10.1* 15.5*    112* 105       Recent Labs   Lab 09/27/24  0359 09/28/24  0403 09/30/24  0627   CALCIUM 9.0 9.7 8.7   ALBUMIN 2.9* 3.2* 3.0*   MG 2.2 2.7* 2.5       Recent Labs   Lab 02/02/23  0745 05/19/23  1022 06/19/23  1031   PTH, Intact 225.6 H 335.8 H 319.0 H       Recent Labs   Lab 09/27/24  2218 09/28/24  0707 09/28/24  1136 09/28/24  1655 09/28/24  1913 09/29/24  0750 09/29/24  1104 09/29/24  1550 09/29/24  2031 09/30/24  0748   POCTGLUCOSE 140* 127* 160* 122* 124* 120* 151* 102 142* 111*       Recent Labs   Lab 10/10/22  2005 06/22/23  0446 07/16/24  0510   Hemoglobin A1C 5.5 5.5 6.1       Recent Labs   Lab 09/28/24  0403 09/29/24  1110 09/30/24  0627   WBC 24.45* 19.88* 15.43*   HGB 8.5* 8.5* 7.4*   HCT 27.7* 27.4* 24.3*   * 560* 482*   MCV 97 99* 97   MCHC 30.7* 31.0* 30.5*   MONO 5.0 7.0 11.0   EOSINOPHIL 0.0 1.0 2.0       Recent Labs   Lab 09/27/24  0359 09/28/24  0403 09/30/24  0627   BILITOT 0.6 0.5 0.4   PROT 7.3 8.3 7.8   ALBUMIN 2.9* 3.2* 3.0*   ALKPHOS 106 133 114   ALT 26 33 38   AST 36 54* 43*       Recent Labs   Lab 10/10/22  1645 01/09/23  1123 06/19/23  1622 07/17/24  0505 09/19/24  1435    Color, UA Yellow   < > Straw Yellow Yellow   Appearance, UA Clear   < > Clear Hazy A Clear   pH, UA 6.0   < > 6.0 6.0 8.0   Specific Gravity, UA 1.025   < > 1.010 1.020 1.010   Protein, UA 2+ A   < > 2+ A 3+ A 2+ A   Glucose, UA Negative   < > Negative Negative Negative   Ketones, UA Negative   < > Negative Negative Negative   Urobilinogen, UA Negative  --   --  Negative Negative   Bilirubin (UA) Negative   < > Negative Negative Negative   Occult Blood UA 1+ A   < > Negative 2+ A 2+ A   Nitrite, UA Negative   < > Negative Negative Negative   RBC, UA 1   < > 0 6 H 4   WBC, UA 0   < > 2 52 H 21 H   Bacteria None   < > None None None   Hyaline Casts, UA 0   < > 0 0 0    < > = values in this interval not displayed.       Recent Labs   Lab 07/15/24  1824 09/17/24  0928 09/25/24  1051   POC PH  --   --  7.404   POC PCO2  --   --  37.7   POC HCO3  --   --  23.6 L   POC PO2  --   --  87   POC SATURATED O2  --   --  97   POC BE  --   --  -1   Sample VENOUS VENOUS ARTERIAL       Microbiology Results (last 7 days)       Procedure Component Value Units Date/Time    Blood culture [[id number]] Collected: 09/24/24 1513    Order Status: Completed Specimen: Blood Updated: 09/29/24 1632     Blood Culture, Routine No growth after 5 days.    Narrative:      Collection has been rescheduled by [email or web address] at 09/23/2024 14:35 Reason:   Patient unavailable dialysis   Collection has been rescheduled by MYB at 09/23/2024 18:44 Reason:   Unable to collect  Collection has been rescheduled by CZB at 09/23/2024 19:02 Reason:   Unable to collect  Collection has been rescheduled by RE1 at 09/24/2024 09:43 Reason:   Spoke to the patient's nurse about unable to collect she is   contacting Lambert and the doctor  Collection has been rescheduled by [email or web address] at 09/23/2024 14:35 Reason:   Patient unavailable dialysis   Collection has been rescheduled by MYB at 09/23/2024 18:44 Reason:   Unable to collect  Collection has been rescheduled by CZB at 09/23/2024 19:02  Reason:   Unable to collect  Collection has been rescheduled by RE1 at 09/24/2024 09:43 Reason:   Spoke to the patient's nurse about unable to collect she is   contacting Lambert and the doctor    Blood culture [[id number]] Collected: 09/25/24 1111    Order Status: Completed Specimen: Blood from Peripheral, Forearm, Right Updated: 09/29/24 1432     Blood Culture, Routine No Growth to date      No Growth to date      No Growth to date      No Growth to date      No Growth to date    Narrative:      [id number]    Blood culture [[id number]] Collected: 09/25/24 1111    Order Status: Completed Specimen: Blood from Peripheral, Antecubital, Right Updated: 09/29/24 1432     Blood Culture, Routine No Growth to date      No Growth to date      No Growth to date      No Growth to date      No Growth to date    Narrative:      [id number]    Blood culture [[id number]] Collected: 09/24/24 1122    Order Status: Completed Specimen: Blood Updated: 09/29/24 1232     Blood Culture, Routine No growth after 5 days.    Narrative:      Collection has been rescheduled by [email or web address] at 09/23/2024 14:35 Reason:   Patient unavailable dialysis   Collection has been rescheduled by MYB at 09/23/2024 18:44 Reason:   Unable to collect  Collection has been rescheduled by CZB at 09/23/2024 19:02 Reason:   Unable to collect  Collection has been rescheduled by RE1 at 09/24/2024 09:43 Reason:   Spoke to the patient's nurse about unable to collect she is   contacting Lambert and the doctor  Collection has been rescheduled by [email or web address] at 09/23/2024 14:35 Reason:   Patient unavailable dialysis   Collection has been rescheduled by MYB at 09/23/2024 18:44 Reason:   Unable to collect  Collection has been rescheduled by CZB at 09/23/2024 19:02 Reason:   Unable to collect  Collection has been rescheduled by RE1 at 09/24/2024 09:43 Reason:   Spoke to the patient's nurse about unable to collect she is   contacting Lambert and the doctor    AFB Culture & Smear [[id number]] Collected:  09/25/24 1413    Order Status: Completed Specimen: Incision site from Abdomen Updated: 09/28/24 1630     AFB CULTURE STAIN No acid fast bacilli seen.     AFB CULTURE STAIN Testing performed by:     AFB CULTURE STAIN [facility] [place]     [facility]     [place] [address]     AFB CULTURE STAIN Dr. Osbaldo Sherwood MD    Culture, Anaerobe [[id number]] Collected: 09/25/24 1413    Order Status: Completed Specimen: Incision site from Abdomen Updated: 09/28/24 1405     Anaerobic Culture Culture in progress    IV catheter culture [[id number]] Collected: 09/25/24 1132    Order Status: Completed Specimen: Catheter Tip, Dialysis Updated: 09/28/24 1113     Aerobic Culture - Cath tip No growth    Narrative:      Trialysis    Aerobic culture [[id number]]  (Abnormal)  (Susceptibility) Collected: 09/24/24 1535    Order Status: Completed Specimen: Incision site from Groin Updated: 09/28/24 0738     Aerobic Bacterial Culture ESCHERICHIA COLI  From broth only      Narrative:      Left groin incision site drainage swab    Aerobic culture [[id number]]  (Abnormal)  (Susceptibility) Collected: 09/25/24 1413    Order Status: Completed Specimen: Incision site from Abdomen Updated: 09/28/24 0737     Aerobic Bacterial Culture ESCHERICHIA COLI  From broth only      Gram stain [[id number]] Collected: 09/25/24 1413    Order Status: Completed Specimen: Incision site from Abdomen Updated: 09/27/24 1530     Gram Stain Result Moderate WBC's      No organisms seen    Fungus culture [[id number]] Collected: 09/25/24 1413    Order Status: Sent Specimen: Incision site from Abdomen Updated: 09/25/24 1652    Gram stain [[id number]] Collected: 09/24/24 1535    Order Status: Completed Specimen: Incision site from Groin Updated: 09/24/24 1829     Gram Stain Result Few WBC's      No organisms seen    Narrative:      Left groin incision site drainage          Jeremy Madrid MD    [facility]  [place]  [phone]

## 2024-09-30 NOTE — ASSESSMENT & PLAN NOTE
S/p open laparotomy with resection of affected colon and creation of colostomy 9/25  ID following: Continue IV Zyvox, IV Zosyn, and Diflucan  Increase mobilization:  Out of bed to chair t.i.d. with meals  PT/OT following  Diet advanced to regular today

## 2024-09-30 NOTE — CARE UPDATE
09/30/24 0831   Patient Assessment/Suction   Level of Consciousness (AVPU) alert   Respiratory Effort Normal;Unlabored   Expansion/Accessory Muscles/Retractions no use of accessory muscles;no retractions;expansion symmetric   All Lung Fields Breath Sounds clear;diminished   Rhythm/Pattern, Respiratory unlabored   Cough Frequency no cough   Skin Integrity   $ Wound Care Tech Time 15 min   Area Observed Bridge of nose   Skin Appearance without discoloration   PRE-TX-O2   Device (Oxygen Therapy) nasal cannula   $ Is the patient on Low Flow Oxygen? Yes   Flow (L/min) (Oxygen Therapy) 2   SpO2 98 %   Pulse Oximetry Type Intermittent   $ Pulse Oximetry - Multiple Charge Pulse Oximetry - Multiple   Pulse 73   Resp 18   Aerosol Therapy   $ Aerosol Therapy Charges PRN treatment not required   Preset CPAP/BiPAP Settings   Mode Of Delivery BiPAP;Standby   $ CPAP/BiPAP Daily Charge BiPAP/CPAP Daily   $ Initial CPAP/BiPAP Setup? No   Equipment Type V60   Education   $ Education 15 min;Oxygen

## 2024-09-30 NOTE — ASSESSMENT & PLAN NOTE
Urology consulted: no surgical/invasive intervention recommended  S/P left inguinal canal exploration with Penrose drain placed in the inguinal canal 9/25 per general surgeon.  Continue antibiotics per Infectious Disease

## 2024-09-30 NOTE — PLAN OF CARE
09/29/24 1957   Patient Assessment/Suction   Level of Consciousness (AVPU) alert   Respiratory Effort Normal;Unlabored   Expansion/Accessory Muscles/Retractions no use of accessory muscles   All Lung Fields Breath Sounds clear;diminished   Rhythm/Pattern, Respiratory depth regular;pattern regular;unlabored   Cough Frequency infrequent   Skin Integrity   $ Wound Care Tech Time 15 min   Area Observed Bridge of nose   Skin Appearance without discoloration   PRE-TX-O2   Device (Oxygen Therapy) room air   SpO2 95 %   Pulse Oximetry Type Intermittent   $ Pulse Oximetry - Multiple Charge Pulse Oximetry - Multiple   Pulse 65   Resp 18   Preset CPAP/BiPAP Settings   Mode Of Delivery Standby;BiPAP   $ CPAP/BiPAP Daily Charge BiPAP/CPAP Daily   $ Initial CPAP/BiPAP Setup? No   Size of Mask Large   Sized Appropriately? Yes   Equipment Type V60   Airway Device Type large full face mask   Ipap 18   Set Rate (Breaths/Min) 12   ITime (sec) 1   Rise Time (sec) 3   CPAP/BiPAP Alarms   High Pressure (cm H2O) 40   Low Pressure (cm H2O) 5   Minute Ventilation (L/Min) 3   High RR (breaths/min) 45   Low RR (breaths/min) 8   Apnea (Sec) 20   Education   $ Education BiPAP;15 min

## 2024-09-30 NOTE — ASSESSMENT & PLAN NOTE
Patient with Hypoxic Respiratory failure which is Acute.  he is not on home oxygen. Supplemental oxygen was provided and noted- Oxygen Concentration (%):  [40] 40  Continue nightly BiPAP and supplemental O2, weaning as tolerated.  Pulmonology signed off 9/29    ABG  Recent Labs   Lab 09/25/24  1051   PH 7.404   PO2 87   PCO2 37.7   HCO3 23.6*   BE -1

## 2024-09-30 NOTE — SUBJECTIVE & OBJECTIVE
Interval History:  Up in bed, awake and alert.  Pain medication not lasting. Unable to finish getting labs yesterday due to being difficult stick and will have collected with dialysis today.  PT/OT evaluated patient and recommends placement.  Advanced to regular diet.  Dialysis planned for today.  Pulmonology signed off yesterday.  Family member at bedside.  Requests topical lidocaine for fistula site prior to having dialysis.     Review of Systems   Constitutional:  Positive for activity change, appetite change and fatigue.   Gastrointestinal:  Positive for abdominal pain (Surgical) and nausea (Improving).   Genitourinary:         Dialysis.  Essentially anuric.   All other systems reviewed and are negative.    Objective:     Vital Signs (Most Recent):  Temp: 98.6 °F (37 °C) (09/30/24 0747)  Pulse: 73 (09/30/24 0831)  Resp: 18 (09/30/24 0831)  BP: (!) 144/69 (09/30/24 0747)  SpO2: 98 % (09/30/24 0831) Vital Signs (24h Range):  Temp:  [97.9 °F (36.6 °C)-98.8 °F (37.1 °C)] 98.6 °F (37 °C)  Pulse:  [64-85] 73  Resp:  [15-20] 18  SpO2:  [94 %-99 %] 98 %  BP: (102-168)/(55-88) 144/69     Weight: 134 kg (295 lb 6.7 oz)  Body mass index is 37.93 kg/m².    Intake/Output Summary (Last 24 hours) at 9/30/2024 1009  Last data filed at 9/30/2024 1006  Gross per 24 hour   Intake 1580 ml   Output 460 ml   Net 1120 ml         Physical Exam  Constitutional:       General: He is not in acute distress.     Appearance: He is obese.   Eyes:      Extraocular Movements: Extraocular movements intact.      Conjunctiva/sclera: Conjunctivae normal.      Pupils: Pupils are equal, round, and reactive to light.   Cardiovascular:      Rate and Rhythm: Normal rate and regular rhythm.      Heart sounds: Normal heart sounds.      Arteriovenous access: Left arteriovenous access is present.  Pulmonary:      Effort: Pulmonary effort is normal. No respiratory distress.      Breath sounds: Normal breath sounds.   Abdominal:      General: There is  distension (Mild).      Tenderness: There is generalized abdominal tenderness (postop pain).      Comments: Colostomy intact to LLQ, stoma pink and moist   Musculoskeletal:         General: No swelling. Normal range of motion.      Cervical back: Normal range of motion and neck supple.   Skin:     General: Skin is warm and dry.      Capillary Refill: Capillary refill takes less than 2 seconds.      Comments: Midline abdominal surgical dressing CDI.  BERNABE drain intact with serosanguineous drainage.  Left groin dressing CDI with a Penrose.   Neurological:      General: No focal deficit present.      Mental Status: He is alert and oriented to person, place, and time.      Motor: Weakness (Generalized) present.             Significant Labs: All pertinent labs within the past 24 hours have been reviewed.  CBC:   Recent Labs   Lab 09/29/24  1110 09/30/24  0627   WBC 19.88* 15.43*   HGB 8.5* 7.4*   HCT 27.4* 24.3*   * 482*     CMP:   Recent Labs   Lab 09/30/24  0627   *   K 4.0   CL 94*   CO2 22*      BUN 63*   CREATININE 15.5*   CALCIUM 8.7   PROT 7.8   ALBUMIN 3.0*   BILITOT 0.4   ALKPHOS 114   AST 43*   ALT 38   ANIONGAP 18*     Magnesium:   Recent Labs   Lab 09/30/24  0627   MG 2.5     Microbiology Results (last 7 days)       Procedure Component Value Units Date/Time    Blood culture [[id number]] Collected: 09/24/24 1513    Order Status: Completed Specimen: Blood Updated: 09/29/24 1632     Blood Culture, Routine No growth after 5 days.    Narrative:      Collection has been rescheduled by [email or web address] at 09/23/2024 14:35 Reason:   Patient unavailable dialysis   Collection has been rescheduled by MYB at 09/23/2024 18:44 Reason:   Unable to collect  Collection has been rescheduled by CZB at 09/23/2024 19:02 Reason:   Unable to collect  Collection has been rescheduled by RE1 at 09/24/2024 09:43 Reason:   Spoke to the patient's nurse about unable to collect she is   contacting [facility] and the doctor  Collection has  been rescheduled by [email or web address] at 09/23/2024 14:35 Reason:   Patient unavailable dialysis   Collection has been rescheduled by MYB at 09/23/2024 18:44 Reason:   Unable to collect  Collection has been rescheduled by CZB at 09/23/2024 19:02 Reason:   Unable to collect  Collection has been rescheduled by RE1 at 09/24/2024 09:43 Reason:   Spoke to the patient's nurse about unable to collect she is   contacting Lambert and the doctor    Blood culture [[id number]] Collected: 09/25/24 1111    Order Status: Completed Specimen: Blood from Peripheral, Forearm, Right Updated: 09/29/24 1432     Blood Culture, Routine No Growth to date      No Growth to date      No Growth to date      No Growth to date      No Growth to date    Narrative:      [id number]    Blood culture [[id number]] Collected: 09/25/24 1111    Order Status: Completed Specimen: Blood from Peripheral, Antecubital, Right Updated: 09/29/24 1432     Blood Culture, Routine No Growth to date      No Growth to date      No Growth to date      No Growth to date      No Growth to date    Narrative:      [id number]    Blood culture [[id number]] Collected: 09/24/24 1122    Order Status: Completed Specimen: Blood Updated: 09/29/24 1232     Blood Culture, Routine No growth after 5 days.    Narrative:      Collection has been rescheduled by [email or web address] at 09/23/2024 14:35 Reason:   Patient unavailable dialysis   Collection has been rescheduled by MYB at 09/23/2024 18:44 Reason:   Unable to collect  Collection has been rescheduled by CZB at 09/23/2024 19:02 Reason:   Unable to collect  Collection has been rescheduled by RE1 at 09/24/2024 09:43 Reason:   Spoke to the patient's nurse about unable to collect she is   contacting Lambert and the doctor  Collection has been rescheduled by [email or web address] at 09/23/2024 14:35 Reason:   Patient unavailable dialysis   Collection has been rescheduled by MYB at 09/23/2024 18:44 Reason:   Unable to collect  Collection has been rescheduled by CZB at 09/23/2024 19:02 Reason:    Unable to collect  Collection has been rescheduled by RE1 at 09/24/2024 09:43 Reason:   Spoke to the patient's nurse about unable to collect she is   contacting Lambert and the doctor    AFB Culture & Smear [[id number]] Collected: 09/25/24 1413    Order Status: Completed Specimen: Incision site from Abdomen Updated: 09/28/24 1630     AFB CULTURE STAIN No acid fast bacilli seen.     AFB CULTURE STAIN Testing performed by:     AFB CULTURE STAIN Lab [facility] [place]     AFB CULTURE STAIN [address] [facility]     [place] [address]     AFB CULTURE STAIN Dr. Osbaldo Sherwood MD    Culture, Anaerobe [[id number]] Collected: 09/25/24 1413    Order Status: Completed Specimen: Incision site from Abdomen Updated: 09/28/24 1405     Anaerobic Culture Culture in progress    IV catheter culture [[id number]] Collected: 09/25/24 1132    Order Status: Completed Specimen: Catheter Tip, Dialysis Updated: 09/28/24 1113     Aerobic Culture - Cath tip No growth    Narrative:      Trialysis    Aerobic culture [[id number]]  (Abnormal)  (Susceptibility) Collected: 09/24/24 1535    Order Status: Completed Specimen: Incision site from Groin Updated: 09/28/24 0738     Aerobic Bacterial Culture ESCHERICHIA COLI  From broth only      Narrative:      Left groin incision site drainage swab    Aerobic culture [[id number]]  (Abnormal)  (Susceptibility) Collected: 09/25/24 1413    Order Status: Completed Specimen: Incision site from Abdomen Updated: 09/28/24 0737     Aerobic Bacterial Culture ESCHERICHIA COLI  From broth only      Gram stain [[id number]] Collected: 09/25/24 1413    Order Status: Completed Specimen: Incision site from Abdomen Updated: 09/27/24 1530     Gram Stain Result Moderate WBC's      No organisms seen    Fungus culture [[id number]] Collected: 09/25/24 1413    Order Status: Sent Specimen: Incision site from Abdomen Updated: 09/25/24 1652    Gram stain [[id number]] Collected: 09/24/24 1535    Order Status:  Completed Specimen: Incision site from Groin Updated: 09/24/24 1829     Gram Stain Result Few WBC's      No organisms seen    Narrative:      Left groin incision site drainage          X-Ray KUB  Narrative: EXAMINATION:  XR KUB    CLINICAL HISTORY:  Nausea with abdominal discomfort.  Recent laparotomy with colostomy;    COMPARISON:  09/26/2024 radiography    CT abdomen and pelvis 09/25/2024    FINDINGS:  Crescentic soft tissue gas involving left lateral abdominal wall, which correlates to findings on reference CT.  There is a percutaneous drain entering at the right lower quadrant, with tip terminating in the left hemipelvis.  Gas-filled mildly distended loops of small bowel in the left mid abdomen.  Rounded hyperdense material in the pelvis.  No acute osseous abnormality appreciated.  Impression: Localized gas involving the left lateral abdominal wall, corresponding to reference CT abdomen findings.    Gas-filled mildly distended loop of bowel in the left mid abdomen which could be on the basis of postoperative ileus or low-grade obstruction.    Indeterminate hyperdense object in the pelvis which could represent contrast in the rectum or urinary bladder.    Electronically signed by: Clark Joshi  Date:    09/28/2024  Time:    15:48        Significant Imaging: I have reviewed all pertinent imaging results/findings within the past 24 hours.

## 2024-09-30 NOTE — PROGRESS NOTES
[facility]  Progress Note    Patient Name: João Ham  MRN: [id number]  Patient Class: IP- Inpatient   Admission Date: 9/17/2024  Length of Stay: 13 days  Attending Physician: Ag Reyes, *  Primary Care Provider: Dawson Granado MD        Subjective:     Principal Problem:Inguinal hernia of left side with gangrene        HPI:  Patient is a 44 year old male with history of ESDR on dialysis MWF, awaiting kidney transplant, HTN, presented to ED with 3 days history of left groin pain and swelling. States swelling comes every time he cough and is being painful. Patient has low grade fever 99s at home. He has been vomiting bilious fluid last 2-3 days. No diarrhea or abdominal pain.     In the ED CT abdomen showed Left inguinal hernia containing fat and air as well as marked inflammation extending from inside the pelvis, in the hernia and down into the left scrotum. This is consistent with an infected inguinal hernia, possible gangrene. WBC was 14, patient was tachycardic. General surgery was consulted and patient was admitted under hospitalist.     Overview/Hospital Course:  Patient is a 44 year old male with history of ESRD, was admitted with suspected inguinal hernia gangrene. General surgery was consulted and patient was taken to OR. Found to have colonic perforation due to mesh erosion with an abscess. Patient underwent sigmoid resection and drainage of the abscess, was started on clindamycin, Vancomycin and cefepime. Nephrology was consulted for chronic dialysis. AVF was not functioning, General surgery consulted, trialysis catheter placed. PRBC transfused during dialysis for low Hb 6.6.  While on broad-spectrum coverage, patient is spiking fevers and worsening leukocytosis, id consulted, discontinued clindamycin, vancomycin and cefepime-added daptomycin, Diflucan and Zosyn.  Repeated CT abdomen and pelvis that showed residual abscess/phlegmon and contained  perforation. Re-consulted surgery who mentioned likely post op changes. Fistulogram 9/24 by vascular surgery for declotting the AV fistula. Trialysis removed 9/25. Had worsening white count, fevers upto 103.5 and hypotension overnight 9/25.  Also had hemoglobin of 7, with worsening hypoxia up to 7 L (overnight desaturated to 70% with lethargy requiring BiPAP placement), after which nephrology recommended 1 unit PRBC transfusion.  Id, Nephrology, Urology, General surgery were updated about his worsening condition.We CT repeated that showed intraperitoneal free air and findings suggestive of necrotizing fasciitis, general surgery was consulted stat, patient was transferred to ICU.  The surgeon performed open laparotomy and noted fecal contamination of the left lower quadrant indicative of anastomotic disruption.  Colon was resected, and colostomy was created. Patient was placed on full liquid diet, was unable to tolerate and downgraded back to clear liquid diet.  Also complaining of excess mucus and cough.  Patient encouraged to use incentive spirometer as directed, and press pillow to abdomen when coughing.  Pulmonology signed off on 09/29 with recommendation to continue BiPAP nightly and with naps.  PT/OT recommended high-intensity therapy due to patient's deconditioning.    Interval History:  Up in bed, awake and alert.  Pain medication not lasting. Unable to finish getting labs yesterday due to being difficult stick and will have collected with dialysis today.  PT/OT evaluated patient and recommends placement.  Advanced to regular diet.  Dialysis planned for today.  Pulmonology signed off yesterday.  Family member at bedside.  Requests topical lidocaine for fistula site prior to having dialysis.     Review of Systems   Constitutional:  Positive for activity change, appetite change and fatigue.   Gastrointestinal:  Positive for abdominal pain (Surgical) and nausea (Improving).   Genitourinary:         Dialysis.   Essentially anuric.   All other systems reviewed and are negative.    Objective:     Vital Signs (Most Recent):  Temp: 98.6 °F (37 °C) (09/30/24 0747)  Pulse: 73 (09/30/24 0831)  Resp: 18 (09/30/24 0831)  BP: (!) 144/69 (09/30/24 0747)  SpO2: 98 % (09/30/24 0831) Vital Signs (24h Range):  Temp:  [97.9 °F (36.6 °C)-98.8 °F (37.1 °C)] 98.6 °F (37 °C)  Pulse:  [64-85] 73  Resp:  [15-20] 18  SpO2:  [94 %-99 %] 98 %  BP: (102-168)/(55-88) 144/69     Weight: 134 kg (295 lb 6.7 oz)  Body mass index is 37.93 kg/m².    Intake/Output Summary (Last 24 hours) at 9/30/2024 1009  Last data filed at 9/30/2024 1006  Gross per 24 hour   Intake 1580 ml   Output 460 ml   Net 1120 ml         Physical Exam  Constitutional:       General: He is not in acute distress.     Appearance: He is obese.   Eyes:      Extraocular Movements: Extraocular movements intact.      Conjunctiva/sclera: Conjunctivae normal.      Pupils: Pupils are equal, round, and reactive to light.   Cardiovascular:      Rate and Rhythm: Normal rate and regular rhythm.      Heart sounds: Normal heart sounds.      Arteriovenous access: Left arteriovenous access is present.  Pulmonary:      Effort: Pulmonary effort is normal. No respiratory distress.      Breath sounds: Normal breath sounds.   Abdominal:      General: There is distension (Mild).      Tenderness: There is generalized abdominal tenderness (postop pain).      Comments: Colostomy intact to LLQ, stoma pink and moist   Musculoskeletal:         General: No swelling. Normal range of motion.      Cervical back: Normal range of motion and neck supple.   Skin:     General: Skin is warm and dry.      Capillary Refill: Capillary refill takes less than 2 seconds.      Comments: Midline abdominal surgical dressing CDI.  BERNABE drain intact with serosanguineous drainage.  Left groin dressing CDI with a Penrose.   Neurological:      General: No focal deficit present.      Mental Status: He is alert and oriented to person,  place, and time.      Motor: Weakness (Generalized) present.             Significant Labs: All pertinent labs within the past 24 hours have been reviewed.  CBC:   Recent Labs   Lab 09/29/24  1110 09/30/24  0627   WBC 19.88* 15.43*   HGB 8.5* 7.4*   HCT 27.4* 24.3*   * 482*     CMP:   Recent Labs   Lab 09/30/24 0627   *   K 4.0   CL 94*   CO2 22*      BUN 63*   CREATININE 15.5*   CALCIUM 8.7   PROT 7.8   ALBUMIN 3.0*   BILITOT 0.4   ALKPHOS 114   AST 43*   ALT 38   ANIONGAP 18*     Magnesium:   Recent Labs   Lab 09/30/24 0627   MG 2.5     Microbiology Results (last 7 days)       Procedure Component Value Units Date/Time    Blood culture [[id number]] Collected: 09/24/24 1513    Order Status: Completed Specimen: Blood Updated: 09/29/24 1632     Blood Culture, Routine No growth after 5 days.    Narrative:      Collection has been rescheduled by [email or web address] at 09/23/2024 14:35 Reason:   Patient unavailable dialysis   Collection has been rescheduled by MYB at 09/23/2024 18:44 Reason:   Unable to collect  Collection has been rescheduled by CZB at 09/23/2024 19:02 Reason:   Unable to collect  Collection has been rescheduled by RE1 at 09/24/2024 09:43 Reason:   Spoke to the patient's nurse about unable to collect she is   contacting Lambert and the doctor  Collection has been rescheduled by [email or web address] at 09/23/2024 14:35 Reason:   Patient unavailable dialysis   Collection has been rescheduled by MYB at 09/23/2024 18:44 Reason:   Unable to collect  Collection has been rescheduled by CZB at 09/23/2024 19:02 Reason:   Unable to collect  Collection has been rescheduled by RE1 at 09/24/2024 09:43 Reason:   Spoke to the patient's nurse about unable to collect she is   contacting Lambert and the doctor    Blood culture [[id number]] Collected: 09/25/24 1111    Order Status: Completed Specimen: Blood from Peripheral, Forearm, Right Updated: 09/29/24 1432     Blood Culture, Routine No Growth to date      No Growth to date      No  Growth to date      No Growth to date      No Growth to date    Narrative:      [id number]    Blood culture [[id number]] Collected: 09/25/24 1111    Order Status: Completed Specimen: Blood from Peripheral, Antecubital, Right Updated: 09/29/24 1432     Blood Culture, Routine No Growth to date      No Growth to date      No Growth to date      No Growth to date      No Growth to date    Narrative:      [id number]    Blood culture [[id number]] Collected: 09/24/24 1122    Order Status: Completed Specimen: Blood Updated: 09/29/24 1232     Blood Culture, Routine No growth after 5 days.    Narrative:      Collection has been rescheduled by [email or web address] at 09/23/2024 14:35 Reason:   Patient unavailable dialysis   Collection has been rescheduled by MYB at 09/23/2024 18:44 Reason:   Unable to collect  Collection has been rescheduled by CZB at 09/23/2024 19:02 Reason:   Unable to collect  Collection has been rescheduled by RE1 at 09/24/2024 09:43 Reason:   Spoke to the patient's nurse about unable to collect she is   contacting Lambert and the doctor  Collection has been rescheduled by [email or web address] at 09/23/2024 14:35 Reason:   Patient unavailable dialysis   Collection has been rescheduled by MYB at 09/23/2024 18:44 Reason:   Unable to collect  Collection has been rescheduled by CZB at 09/23/2024 19:02 Reason:   Unable to collect  Collection has been rescheduled by RE1 at 09/24/2024 09:43 Reason:   Spoke to the patient's nurse about unable to collect she is   contacting Lambert and the doctor    AFB Culture & Smear [[id number]] Collected: 09/25/24 1413    Order Status: Completed Specimen: Incision site from Abdomen Updated: 09/28/24 1630     AFB CULTURE STAIN No acid fast bacilli seen.     AFB CULTURE STAIN Testing performed by:     AFB CULTURE STAIN Lab [facility] [place]     [facility] [address] [facility]     [place] [address]     AFB CULTURE STAIN Dr. Osbaldo Sherwood MD    Culture, Anaerobe [[id number]] Collected: 09/25/24  1413    Order Status: Completed Specimen: Incision site from Abdomen Updated: 09/28/24 1405     Anaerobic Culture Culture in progress    IV catheter culture [[id number]] Collected: 09/25/24 1132    Order Status: Completed Specimen: Catheter Tip, Dialysis Updated: 09/28/24 1113     Aerobic Culture - Cath tip No growth    Narrative:      Trialysis    Aerobic culture [[id number]]  (Abnormal)  (Susceptibility) Collected: 09/24/24 1535    Order Status: Completed Specimen: Incision site from Groin Updated: 09/28/24 0738     Aerobic Bacterial Culture ESCHERICHIA COLI  From broth only      Narrative:      Left groin incision site drainage swab    Aerobic culture [[id number]]  (Abnormal)  (Susceptibility) Collected: 09/25/24 1413    Order Status: Completed Specimen: Incision site from Abdomen Updated: 09/28/24 0737     Aerobic Bacterial Culture ESCHERICHIA COLI  From broth only      Gram stain [[id number]] Collected: 09/25/24 1413    Order Status: Completed Specimen: Incision site from Abdomen Updated: 09/27/24 1530     Gram Stain Result Moderate WBC's      No organisms seen    Fungus culture [[id number]] Collected: 09/25/24 1413    Order Status: Sent Specimen: Incision site from Abdomen Updated: 09/25/24 1652    Gram stain [[id number]] Collected: 09/24/24 1535    Order Status: Completed Specimen: Incision site from Groin Updated: 09/24/24 1829     Gram Stain Result Few WBC's      No organisms seen    Narrative:      Left groin incision site drainage          X-Ray KUB  Narrative: EXAMINATION:  XR KUB    CLINICAL HISTORY:  Nausea with abdominal discomfort.  Recent laparotomy with colostomy;    COMPARISON:  09/26/2024 radiography    CT abdomen and pelvis 09/25/2024    FINDINGS:  Crescentic soft tissue gas involving left lateral abdominal wall, which correlates to findings on reference CT.  There is a percutaneous drain entering at the right lower quadrant, with tip terminating in the left hemipelvis.  Gas-filled mildly  distended loops of small bowel in the left mid abdomen.  Rounded hyperdense material in the pelvis.  No acute osseous abnormality appreciated.  Impression: Localized gas involving the left lateral abdominal wall, corresponding to reference CT abdomen findings.    Gas-filled mildly distended loop of bowel in the left mid abdomen which could be on the basis of postoperative ileus or low-grade obstruction.    Indeterminate hyperdense object in the pelvis which could represent contrast in the rectum or urinary bladder.    Electronically signed by: Clark Joshi  Date:    09/28/2024  Time:    15:48        Significant Imaging: I have reviewed all pertinent imaging results/findings within the past 24 hours.    Assessment/Plan:      * Inguinal hernia of left side with gangrene  W/ Colonic perforation with abscess  CT abdomen shows Left inguinal hernia containing fat and air as well as marked inflammation extending from inside the pelvis, in the hernia and down into the left scrotum.   S/P Robotic sigmoid resection, Mobilization of splenic flexure, left inguinal canal exploration, and I&D 9/17 per Dr. Connors  Advanced to regular diet this morning  Pain uncontrolled: Increase Percocet to 7.5mg for pain management    Sepsis  This patient does have evidence of infective focus  My overall impression is sepsis.  Source: Abdominal  Antibiotics given-   Antibiotics (72h ago, onward)      Start     Stop Route Frequency Ordered    09/27/24 1015  linezolid 600 mg/300 mL IVPB 600 mg         -- IV Every 12 hours (non-standard times) 09/27/24 0909    09/24/24 1800  piperacillin-tazobactam 4.5 g in dextrose 5 % 100 mL IVPB (ready to mix)         -- IV Every 12 hours (non-standard times) 09/24/24 1035        fluconazole (DIFLUCAN) IVPB 200 mg  Start Date/Time (Original Order): 09/22/24 1500   Ordered Dose: 200 mg Route: Intravenous Frequency: Every 24 hours (non-standard times)       Source control achieved by: IV antibiotics and surgical  intervention  =======================================================  Blood culture 09/07/2024: NGTD   Urine culture 09/19/2024: NGTD   Blood culture 09/24/2024:  NGTD   Left groin drainage culture 09/24/2024:  E coli only resistant to tetracycline, cefazolin, ampicillin and ceftriaxone  Abdomen incision site culture 09/24/2024:  E coli only sensitive to ceftriaxone and tetracycline  =====================================================  WBC continues trending down   Infectious disease following   Continue current antibiotic regimen    Perforation of intestine  S/p open laparotomy with resection of affected colon and creation of colostomy 9/25  ID following: Continue IV Zyvox, IV Zosyn, and Diflucan  Increase mobilization:  Out of bed to chair t.i.d. with meals  PT/OT following  Diet advanced to regular today    Orchitis  Urology consulted: no surgical/invasive intervention recommended  S/P left inguinal canal exploration with Penrose drain placed in the inguinal canal 9/25 per general surgeon.  Continue antibiotics per Infectious Disease    Debility  Patient with Acute debility due to bed confinement status and prolonged hospitalization, and infection . Latest AMPAC and GEMS scores have not been reviewed. Plan includes progressive mobility protocol initated, PT/OT consulted, fall precautions in place, and CM following for discharge planning .    Hyperkalemia  Hyperkalemia is likely due to ESRD.The patients most recent potassium results are listed below.  Recent Labs     09/28/24  0403 09/30/24  0627   K 4.6 4.0       Plan  - Monitor for arrhythmias with EKG and/or continuous telemetry.   - Treat the hyperkalemia with Potassium Binders and hemodialysis .   - Monitor potassium: Daily  - The patient's hyperkalemia is resolved    Acute hypoxic respiratory failure  Patient with Hypoxic Respiratory failure which is Acute.  he is not on home oxygen. Supplemental oxygen was provided and noted- Oxygen Concentration (%):   [40] 40  Continue nightly BiPAP and supplemental O2, weaning as tolerated.  Pulmonology signed off 9/29    ABG  Recent Labs   Lab 09/25/24  1051   PH 7.404   PO2 87   PCO2 37.7   HCO3 23.6*   BE -1         Essential hypertension  Chronic, controlled. Latest blood pressure and vitals reviewed-     Temp:  [97.9 °F (36.6 °C)-98.8 °F (37.1 °C)]   Pulse:  [64-85]   Resp:  [15-20]   BP: (102-168)/(55-88)   SpO2:  [94 %-99 %] .   Home meds for hypertension were reviewed and noted below.   Hypertension Medications               cloNIDine (CATAPRES) 0.3 MG tablet TAKE 1 TABLET BY MOUTH THREE TIMES DAILY    hydrALAZINE (APRESOLINE) 100 MG tablet Take 1 tablet (100 mg total) by mouth 3 (three) times daily.    isosorbide mononitrate (IMDUR) 120 MG 24 hr tablet Take 120 mg by mouth once daily.    metoprolol succinate (TOPROL-XL) 50 MG 24 hr tablet Take 150 mg by mouth once daily.    olmesartan (BENICAR) 40 MG tablet Take 1 tablet by mouth once daily        While in the hospital, will manage blood pressure as follows; Continue current antihypertensive regimen: hydralazine, losartan, clonidine, and metoprolol.  Patient was apparently getting nitro paste q.6 hours.  This was discontinued yesterday and his home isosorbide was resumed.    Will utilize p.r.n. blood pressure medication only if patient's blood pressure greater than 180/110 and he develops symptoms such as worsening chest pain or shortness of breath.    Sleep apnea  Currently getting BiPAP nightly while in the hospital per pulmonology    Pulmonary hypertension  Chronic.  No need to address acutely.    Severe obesity with body mass index (BMI) of 35.0 to 39.9 with comorbidity  Body mass index is 37.93 kg/m². Morbid obesity complicates all aspects of disease management from diagnostic modalities to treatment. Weight loss encouraged and health benefits explained to patient.     Anemia due to chronic kidney disease, on chronic dialysis  Anemia is likely due to acute blood  loss which was from surgery and chronic disease due to ESRD. Most recent hemoglobin and hematocrit are listed below.  Recent Labs     09/28/24  0403 09/29/24  1110 09/30/24  0627   HGB 8.5* 8.5* 7.4*   HCT 27.7* 27.4* 24.3*     Plan  - Monitor serial CBC: Daily  - Transfuse PRBC if patient becomes hemodynamically unstable, symptomatic or H/H drops below 7/21.  - Patient has received 3 units of PRBCs  - Patient's anemia is currently stable    ESRD (end stage renal disease)  Creatine stable for now. BMP reviewed- noted Estimated Creatinine Clearance: 8.9 mL/min (A) (based on SCr of 15.5 mg/dL (H)). according to latest data. Based on current GFR, CKD stage is end stage.  Monitor UOP and serial BMP and adjust therapy as needed. Renally dose meds. Avoid nephrotoxic medications and procedures.  Nephrology following  Dialysis management per Nephrology  Renal diet  Start EMLA topical so AV fistula site PRN prior to dialysis sessions      VTE Risk Mitigation (From admission, onward)           Ordered     heparin (porcine) injection 4,000 Units  As needed (PRN)         09/20/24 1544     heparin (porcine) injection 7,500 Units  Every 8 hours         09/17/24 1031     IP VTE HIGH RISK PATIENT  Once         09/17/24 1031     Place sequential compression device  Until discontinued         09/17/24 1031                    Discharge Planning   MARIA ISABEL: 10/3/2024     Code Status: Full Code   Is the patient medically ready for discharge?:     Reason for patient still in hospital (select all that apply): Patient unstable, Patient trending condition, Treatment, Consult recommendations, and PT / OT recommendations  Discharge Plan A: Rehab   Discharge Delays: None known at this time              Tessy Lozano NP  Department of Hospital Medicine   [facility]

## 2024-09-30 NOTE — PT/OT/SLP PROGRESS
Occupational Therapy      Patient Name:  João Ham   MRN:  [id number]    Patient not seen today secondary to Dialysis.     9/30/2024

## 2024-09-30 NOTE — PT/OT/SLP PROGRESS
Physical Therapy      Patient Name:  João Ham   MRN:  [id number]    Patient not seen today secondary to receiving dialysis. Will follow-up next scheduled visit.

## 2024-09-30 NOTE — ASSESSMENT & PLAN NOTE
Creatine stable for now. BMP reviewed- noted Estimated Creatinine Clearance: 8.9 mL/min (A) (based on SCr of 15.5 mg/dL (H)). according to latest data. Based on current GFR, CKD stage is end stage.  Monitor UOP and serial BMP and adjust therapy as needed. Renally dose meds. Avoid nephrotoxic medications and procedures.  Nephrology following  Dialysis management per Nephrology  Renal diet  Start EMLA topical so AV fistula site PRN prior to dialysis sessions

## 2024-09-30 NOTE — ASSESSMENT & PLAN NOTE
Anemia is likely due to acute blood loss which was from surgery and chronic disease due to ESRD. Most recent hemoglobin and hematocrit are listed below.  Recent Labs     09/28/24  0403 09/29/24  1110 09/30/24  0627   HGB 8.5* 8.5* 7.4*   HCT 27.7* 27.4* 24.3*     Plan  - Monitor serial CBC: Daily  - Transfuse PRBC if patient becomes hemodynamically unstable, symptomatic or H/H drops below 7/21.  - Patient has received 3 units of PRBCs  - Patient's anemia is currently stable

## 2024-09-30 NOTE — ASSESSMENT & PLAN NOTE
W/ Colonic perforation with abscess  CT abdomen shows Left inguinal hernia containing fat and air as well as marked inflammation extending from inside the pelvis, in the hernia and down into the left scrotum.   S/P Robotic sigmoid resection, Mobilization of splenic flexure, left inguinal canal exploration, and I&D 9/17 per Dr. Connors  Advanced to regular diet this morning  Pain uncontrolled: Increase Percocet to 7.5mg for pain management

## 2024-09-30 NOTE — PROGRESS NOTES
[facility]   Department of Infectious Disease  Progress Note        PATIENT NAME: João Ham  MRN: [id number]  TODAY'S DATE: 09/30/2024  ADMIT DATE: 9/17/2024  LOS: 13 days    CHIEF COMPLAINT: Fatigue and Groin Swelling (X 2 days.  Hx of Kidney failure and CHF)      PRINCIPLE PROBLEM: Inguinal hernia of left side with gangrene    INTERVAL HISTORY      09/23/2024: Seen and evaluated at bedside.  No acute issues.  Leukocytosis persists.  Afebrile.      09/24/2024:  Leukocytosis.  Remains afebrile.  Fluid in BERNABE drain remains dark green.  Seen by vascular surgeon with plan for left upper extremity AV fistula fistulogram.    09/25/2024: Leukocytosis persists.  He is otherwise stable.  Left inguinal drainage fluid sent for Gram stain and culture.  G stain was negative.  Culture in progress.  He remains on broad-spectrum antimicrobials.  He was seen by urologist yesterday with plan for conservative management of left scrotal edema with orchitis.  Repeat CT this morning showed gas in the abdomen resident concern for anastomotic leak.     09/26/2024:  He had exploratory laparotomy yesterday with colon resection and end colostomy, mobilization of splenic flexure and left inguinal canal exploration.  A Penrose drain was placed in the inguinal canal.  Left IJ Vas-Cath was removed yesterday.    09/27/2024:  Leukocytosis.  Fever cough trending down.  Cultures from surgery so far negative.  Pain control better.    09/28/2024:  WBC back up to 24 K. cultures have grown E coli so far with 1 strain resistant to ceftriaxone and tetracycline.  Having nausea and is on clear liquid diet.  States he is Not feeling good.    09/29/2024:  He is feeling much better.  WBC down to 20 K.  Tolerating oral feeds.  KUB showed gas-filled bowel loops on the left side concerning for ileus.  Also showed lateral abdominal wall gas.      09/30/2024:  Continues to improve.  WBC down to 15 K.  No other acute issues overnight.    Antibiotics  (From admission, onward)      Start     Stop Route Frequency Ordered    09/27/24 1015  linezolid 600 mg/300 mL IVPB 600 mg         -- IV Every 12 hours (non-standard times) 09/27/24 0909    09/24/24 1800  piperacillin-tazobactam 4.5 g in dextrose 5 % 100 mL IVPB (ready to mix)         -- IV Every 12 hours (non-standard times) 09/24/24 1035    09/17/24 2100  mupirocin 2 % ointment         09/22/24 2059 Nasl 2 times daily 09/17/24 1845          Antifungals (From admission, onward)      Start     Stop Route Frequency Ordered    09/22/24 1930  fluconazole (DIFLUCAN) IVPB 200 mg         -- IV Every 24 hours (non-standard times) 09/22/24 1520           Antivirals (From admission, onward)      None            ASSESSMENT and PLAN      1. Incarcerated left inguinal hernia with necrosis and left inguinal abscess.  Had I&D of abscess, partial sigmoid colectomy with primary anastomosis on 09/17/2024.  Then had anastomotic leak and went back to surgery 09/25/2024 for laparotomy, colectomy and colostomy with of left inguinal area.  Continue antibiotics    2. Leukocytosis.  As above    3. Bilateral epididymo-orchitis worse on the left.  Resolving.  Antibiotics as above.    4. ESRD on hemodialysis Monday/ Wednesday/Friday.  Status post left upper extremity fistulogram.  He is on transplant list.     RECOMMENDATIONS:    Continue current antimicrobial  Plan to treat for about 14 day from last surgery  Anticipate switch linezolid and fluconazole to p.o.  in the next 1-2 days if patient continues to tolerate oral feeds well.    Please send Epic secure chat with any questions.  Discussed with patient and his grown father at bedside.    SUBJECTIVE      Antibiotics   Vancomycin: 09/17/2024-09/20/2024   Clindamycin:  09/07/2020 04/09/2020 2/24   Cefepime: 09/07/2024-09/22/2024   Daptomycin: 09/22/2024-  Fluconazole: 09/22/2024-  Zosyn: 09/22/2024-    Microbiology  Blood culture 09/07/2024: NGTD   Urine culture 09/19/2024: NGTD   Blood  culture 09/24/2024:  NGTD   Left groin drainage culture 09/24/2024:  E coli in broth only resistant to tetracycline, cefazolin, ampicillin and ceftriaxone  Abdomen incision site culture 09/24/2024:  E coli in broth only sensitive to ceftriaxone and tetracycline    Review of Systems  Negative except as stated above in Interval History     OBJECTIVE   Temp:  [97.9 °F (36.6 °C)-98.8 °F (37.1 °C)] 98.6 °F (37 °C)  Pulse:  [64-85] 73  Resp:  [15-20] 20  SpO2:  [94 %-99 %] 98 %  BP: (102-168)/(55-88) 144/69  Temp:  [97.9 °F (36.6 °C)-98.8 °F (37.1 °C)]   Temp: 98.6 °F (37 °C) (09/30/24 0747)  Pulse: 73 (09/30/24 0747)  Resp: 20 (09/30/24 0816)  BP: (!) 144/69 (09/30/24 0747)  SpO2: 98 % (09/30/24 0747)    Intake/Output Summary (Last 24 hours) at 9/30/2024 0829  Last data filed at 9/30/2024 0413  Gross per 24 hour   Intake 1820 ml   Output 810 ml   Net 1010 ml       Physical Exam  General:  Middle-aged man lying quietly in bed.  Looks much better today  Abdomen:  Distended.  Dry dressing midline.  Colostomy left lower abdomen with BERNABE drain.  Dressing left groin area with wound packing.  Genitals:  Firm Edematous scrotum worse on the left.  Nontender  CVS: S1 and 2 heard, no murmurs appreciated   Respiratory: Clear to auscultation   Skin: No rash appreciated   Musculoskeletal system: No joint or bony abnormalities appreciated   CNS: No gross focal deficits  Psych: Good mood, normal affect.    VAD:  Right radial arterial line, PIV  ISOLATION:  None    WOUNDS:  Abdominal surgical wounds, open left groin wound with packing    Significant Labs: All pertinent labs within the past 24 hours have been reviewed.    CBC LAST 7 DAYS  Recent Labs   Lab 09/23/24  0918 09/24/24  1122 09/25/24  0236 09/25/24  1737 09/26/24  0501 09/26/24  0954 09/27/24  0359 09/28/24  0403 09/29/24  1110 09/30/24  0627   WBC 17.64*   < > 17.03* 22.95* 20.66*  --  20.31* 24.45* 19.88* 15.43*   RBC 2.61*   < > 2.26* 3.13* 3.00*  --  2.66* 2.87* 2.77* 2.51*  "  HGB 8.0*   < > 7.2* 9.5* 8.9* 8.2* 7.9* 8.5* 8.5* 7.4*   HCT 25.4*   < > 22.2* 29.4* 27.7*  --  25.3* 27.7* 27.4* 24.3*   MCV 97   < > 98 94 92  --  95 97 99* 97   MCH 30.7   < > 31.9* 30.4 29.7  --  29.7 29.6 30.7 29.5   MCHC 31.5*   < > 32.4 32.3 32.1  --  31.2* 30.7* 31.0* 30.5*   RDW 19.9*   < > 20.0* 22.7* 23.3*  --  23.1* 22.5* 22.7* 21.5*      < > 389 470* 443  --  466* 560* 560* 482*   MPV 10.5   < > 10.3 10.3 10.5  --  10.5 10.2 10.7 10.2   GRAN 77.0*   < > 81.0* 79.0* 81.0*  --  73.0 76.0* 72.0 62.0   LYMPH 12.0*  CANCELED   < > 3.0* 8.0* 0.0*  --  6.0* 1.0* 11.0* 10.0*   MONO 2.0*  CANCELED   < > 2.0* 2.0* 8.0  --  7.0 5.0 7.0 11.0   BASO CANCELED  --   --   --   --   --   --   --   --   --    NRBC 0   < > 1* 0 0  --  0 0 0 1*    < > = values in this interval not displayed.       CHEMISTRY LAST 7 DAYS  Recent Labs   Lab 09/24/24  1122 09/25/24  0236 09/25/24  1051 09/25/24  1737 09/26/24  0501 09/27/24  0359 09/28/24  0403 09/30/24  0627    137  --  136 135* 137 137 134*   K 4.2 4.0  --  4.4 5.3* 4.7 4.6 4.0   CL 93* 97  --  98 96 97 97 94*   CO2 22* 25  --  18* 24 24 23 22*   ANIONGAP 21* 15  --  20* 15 16 17* 18*   BUN 75* 46*  --  51* 39* 33* 37* 63*   CREATININE 17.7* 13.0*  --  13.4* 11.4* 10.3* 10.1* 15.5*    136*  --  108 110 106 112* 105   CALCIUM 8.8 8.9  --  8.7 8.7 9.0 9.7 8.7   PH  --   --  7.404  --   --   --   --   --    MG 2.3 2.2  --  2.2 2.0 2.2 2.7* 2.5   ALBUMIN 3.0* 3.1*  --  3.0* 2.9* 2.9* 3.2* 3.0*   PROT 7.3 7.2  --  7.4 7.3 7.3 8.3 7.8   ALKPHOS 113 161*  --  152* 124 106 133 114   ALT 31 48*  --  45* 37 26 33 38   AST 37 64*  --  49* 38 36 54* 43*   BILITOT 0.5 0.6  --  0.9 0.7 0.6 0.5 0.4       Estimated Creatinine Clearance: 8.9 mL/min (A) (based on SCr of 15.5 mg/dL (H)).    INFLAMMATORY/PROCAL  LAST 7 DAYS  Recent Labs   Lab 09/24/24  1514   PROCAL 13.932*   CRP 35.70*     No results found for: "ESR"  CRP   Date Value Ref Range Status   09/24/2024 35.70 " (H) <1.00 mg/dL Final     Comment:     CRP-Normal Application expected values:   <1.0        mg/dL   Normal Range  1.0 - 5.0  mg/dL   Indicates mild inflammation  5.0 - 10.0 mg/dL   Indicates severe inflammation  >10.0        mg/dL   Represents serious processes and   frequently         indicates the presence of a bacterial   infection.      09/20/2024 >16.00 (H) <1.00 mg/dL Final     Comment:     CRP-Normal Application expected values:   <1.0        mg/dL   Normal Range  1.0 - 5.0  mg/dL   Indicates mild inflammation  5.0 - 10.0 mg/dL   Indicates severe inflammation  >10.0        mg/dL   Represents serious processes and   frequently         indicates the presence of a bacterial   infection.      07/16/2024 >16.00 (H) <1.00 mg/dL Final     Comment:     CRP-Normal Application expected values:   <1.0        mg/dL   Normal Range  1.0 - 5.0  mg/dL   Indicates mild inflammation  5.0 - 10.0 mg/dL   Indicates severe inflammation  >10.0        mg/dL   Represents serious processes and   frequently         indicates the presence of a bacterial   infection.          PRIOR  MICROBIOLOGY:    Susceptibility data from last 90 days.  Collected Specimen Info Organism Amp/Sulbactam Ampicillin Cefazolin Cefepime Ceftriaxone Ciprofloxacin Clindamycin Ertapenem Erythromycin Gentamicin Levofloxacin Meropenem Oxacillin Penicillin   09/25/24 Incision site from Abdomen Culture in progress                 09/25/24 Incision site from Abdomen Escherichia coli  I  R  R  S  S  S   S   S  S  S     09/24/24 Incision site from Groin Escherichia coli  R  R  R  S  R  S   S   S  S  S     09/24/24 Blood No growth after 5 days.                 09/24/24 Blood No growth after 5 days.                 09/17/24 Blood from Peripheral, Antecubital, Right No growth after 5 days.                 09/17/24 Blood from Peripheral, Antecubital, Right No growth after 5 days.                 07/19/24 Catheter Tip, Tunneled Staphylococcus aureus      S   S   I     S  R    07/16/24 Blood from Peripheral, Forearm, Right Staphylococcus aureus                 07/15/24 Blood from Peripheral, Antecubital, Right Staphylococcus aureus      S   S   S     S  R   07/15/24 Blood from Peripheral, Hand, Right Staphylococcus aureus                   Collected Specimen Info Organism PIPERACILLIN/TAZO SUSCEPTIBILITY Tetracycline Tobramycin Trimeth/Sulfa Vancomycin   09/25/24 Incision site from Abdomen Culture in progress        09/25/24 Incision site from Abdomen Escherichia coli  S  S  S  S    09/24/24 Incision site from Groin Escherichia coli  S  R  S  S    09/24/24 Blood No growth after 5 days.        09/24/24 Blood No growth after 5 days.        09/17/24 Blood from Peripheral, Antecubital, Right No growth after 5 days.        09/17/24 Blood from Peripheral, Antecubital, Right No growth after 5 days.        07/19/24 Catheter Tip, Tunneled Staphylococcus aureus   S   S  S   07/16/24 Blood from Peripheral, Forearm, Right Staphylococcus aureus        07/15/24 Blood from Peripheral, Antecubital, Right Staphylococcus aureus   S   S  S   07/15/24 Blood from Peripheral, Hand, Right Staphylococcus aureus            LAST 7 DAYS MICROBIOLOGY   Microbiology Results (last 7 days)       Procedure Component Value Units Date/Time    Blood culture [[id number]] Collected: 09/24/24 1513    Order Status: Completed Specimen: Blood Updated: 09/29/24 1632     Blood Culture, Routine No growth after 5 days.    Narrative:      Collection has been rescheduled by [email or web address] at 09/23/2024 14:35 Reason:   Patient unavailable dialysis   Collection has been rescheduled by MYB at 09/23/2024 18:44 Reason:   Unable to collect  Collection has been rescheduled by CZB at 09/23/2024 19:02 Reason:   Unable to collect  Collection has been rescheduled by RE1 at 09/24/2024 09:43 Reason:   Spoke to the patient's nurse about unable to collect she is   contacting Lambert and the doctor  Collection has been rescheduled by [email or web address] at 09/23/2024 14:35  Reason:   Patient unavailable dialysis   Collection has been rescheduled by MYB at 09/23/2024 18:44 Reason:   Unable to collect  Collection has been rescheduled by CZB at 09/23/2024 19:02 Reason:   Unable to collect  Collection has been rescheduled by RE1 at 09/24/2024 09:43 Reason:   Spoke to the patient's nurse about unable to collect she is   contacting Lambert and the doctor    Blood culture [[id number]] Collected: 09/25/24 1111    Order Status: Completed Specimen: Blood from Peripheral, Forearm, Right Updated: 09/29/24 1432     Blood Culture, Routine No Growth to date      No Growth to date      No Growth to date      No Growth to date      No Growth to date    Narrative:      [id number]    Blood culture [[id number]] Collected: 09/25/24 1111    Order Status: Completed Specimen: Blood from Peripheral, Antecubital, Right Updated: 09/29/24 1432     Blood Culture, Routine No Growth to date      No Growth to date      No Growth to date      No Growth to date      No Growth to date    Narrative:      [id number]    Blood culture [[id number]] Collected: 09/24/24 1122    Order Status: Completed Specimen: Blood Updated: 09/29/24 1232     Blood Culture, Routine No growth after 5 days.    Narrative:      Collection has been rescheduled by [email or web address] at 09/23/2024 14:35 Reason:   Patient unavailable dialysis   Collection has been rescheduled by MYB at 09/23/2024 18:44 Reason:   Unable to collect  Collection has been rescheduled by CZB at 09/23/2024 19:02 Reason:   Unable to collect  Collection has been rescheduled by RE1 at 09/24/2024 09:43 Reason:   Spoke to the patient's nurse about unable to collect she is   contacting Lambert and the doctor  Collection has been rescheduled by [email or web address] at 09/23/2024 14:35 Reason:   Patient unavailable dialysis   Collection has been rescheduled by MYB at 09/23/2024 18:44 Reason:   Unable to collect  Collection has been rescheduled by CZB at 09/23/2024 19:02 Reason:   Unable to collect  Collection has been  rescheduled by RE1 at 09/24/2024 09:43 Reason:   Spoke to the patient's nurse about unable to collect she is   contacting Lambert and the doctor    AFB Culture & Smear [[id number]] Collected: 09/25/24 1413    Order Status: Completed Specimen: Incision site from Abdomen Updated: 09/28/24 1630     AFB CULTURE STAIN No acid fast bacilli seen.     AFB CULTURE STAIN Testing performed by:     AFB CULTURE STAIN Lab [facility]     AFB CULTURE STAIN [address] [facility]     [place] [address]     AFB CULTURE STAIN Dr. Osbaldo Sherwood MD    Culture, Anaerobe [[id number]] Collected: 09/25/24 1413    Order Status: Completed Specimen: Incision site from Abdomen Updated: 09/28/24 1405     Anaerobic Culture Culture in progress    IV catheter culture [[id number]] Collected: 09/25/24 1132    Order Status: Completed Specimen: Catheter Tip, Dialysis Updated: 09/28/24 1113     Aerobic Culture - Cath tip No growth    Narrative:      Trialysis    Aerobic culture [[id number]]  (Abnormal)  (Susceptibility) Collected: 09/24/24 1535    Order Status: Completed Specimen: Incision site from Groin Updated: 09/28/24 0738     Aerobic Bacterial Culture ESCHERICHIA COLI  From broth only      Narrative:      Left groin incision site drainage swab    Aerobic culture [[id number]]  (Abnormal)  (Susceptibility) Collected: 09/25/24 1413    Order Status: Completed Specimen: Incision site from Abdomen Updated: 09/28/24 0737     Aerobic Bacterial Culture ESCHERICHIA COLI  From broth only      Gram stain [[id number]] Collected: 09/25/24 1413    Order Status: Completed Specimen: Incision site from Abdomen Updated: 09/27/24 1530     Gram Stain Result Moderate WBC's      No organisms seen    Fungus culture [[id number]] Collected: 09/25/24 1413    Order Status: Sent Specimen: Incision site from Abdomen Updated: 09/25/24 1652    Gram stain [[id number]] Collected: 09/24/24 1535    Order Status: Completed Specimen: Incision site from Groin  Updated: 09/24/24 1829     Gram Stain Result Few WBC's      No organisms seen    Narrative:      Left groin incision site drainage            CURRENT/PREVIOUS VISIT EKG  Results for orders placed or performed during the hospital encounter of 09/17/24   EKG 12-lead    Collection Time: 09/17/24  8:57 AM   Result Value Ref Range    QRS Duration 92 ms    [facility] QTC Calculation 469 ms    Narrative    Test Reason : N18.6,    Vent. Rate : 119 BPM     Atrial Rate : 119 BPM     P-R Int : 134 ms          QRS Dur : 092 ms      QT Int : 334 ms       P-R-T Axes : 027 -78 004 degrees     QTc Int : 469 ms    Sinus tachycardia  Left axis deviation  Cannot rule out Anterior infarct ,age undetermined  Abnormal ECG  When compared with ECG of 15-JUL-2024 17:47,  T wave inversion now evident in Inferior leads  T wave inversion no longer evident in Lateral leads  Confirmed by Jose Cruz MD ([id number]) on 9/22/2024 1:02:21 PM    Referred By: [email or web address]   SELF           Confirmed By:Jose Cruz MD     Significant Imaging: I have reviewed all relevant and available imaging results/findings within the past 24 hours.    I spent a total of 45 minutes on the day of the visit.This includes face to face time and non-face to face time preparing to see the patient (eg, review of tests), obtaining and/or reviewing separately obtained history, documenting clinical information in the electronic or other health record, independently interpreting results and communicating results to the patient/family/caregiver, or care coordinator.    Kerwin Au MD  Date of Service: 09/30/2024      This note was created using [employer] voice recognition software that occasionally misinterpreted phrases or words.

## 2024-09-30 NOTE — ASSESSMENT & PLAN NOTE
Chronic, controlled. Latest blood pressure and vitals reviewed-     Temp:  [97.9 °F (36.6 °C)-98.8 °F (37.1 °C)]   Pulse:  [64-85]   Resp:  [15-20]   BP: (102-168)/(55-88)   SpO2:  [94 %-99 %] .   Home meds for hypertension were reviewed and noted below.   Hypertension Medications               cloNIDine (CATAPRES) 0.3 MG tablet TAKE 1 TABLET BY MOUTH THREE TIMES DAILY    hydrALAZINE (APRESOLINE) 100 MG tablet Take 1 tablet (100 mg total) by mouth 3 (three) times daily.    isosorbide mononitrate (IMDUR) 120 MG 24 hr tablet Take 120 mg by mouth once daily.    metoprolol succinate (TOPROL-XL) 50 MG 24 hr tablet Take 150 mg by mouth once daily.    olmesartan (BENICAR) 40 MG tablet Take 1 tablet by mouth once daily        While in the hospital, will manage blood pressure as follows; Continue current antihypertensive regimen: hydralazine, losartan, clonidine, and metoprolol.  Patient was apparently getting nitro paste q.6 hours.  This was discontinued yesterday and his home isosorbide was resumed.    Will utilize p.r.n. blood pressure medication only if patient's blood pressure greater than 180/110 and he develops symptoms such as worsening chest pain or shortness of breath.

## 2024-09-30 NOTE — PROGRESS NOTES
Chart review  Pt tolerating diet  NO acute issues  Labs continue to improve  Ostomy functioning  Will see tomorrow.  Anticipate pulling penrose drains in next 24-48 hours.  BERNABE drain will likely remain until d/c

## 2024-09-30 NOTE — PROGRESS NOTES
09/30/24 1415   Post-Hemodialysis Assessment   Rinseback Volume (mL) 250 mL   Blood Volume Processed (Liters) 65 L   Dialyzer Clearance Lightly streaked   Duration of Treatment 180 minutes   Additional Fluid Intake (mL) 500 mL   Total UF (mL) 4500 mL   Net Fluid Removal 4000   Patient Response to Treatment tolerated well   Post-Treatment Weight 127.2 kg (280 lb 6.8 oz)   Treatment Weight Change -4   Arterial bleeding stop time (min) 6 min   Venous bleeding stop time (min) 6 min   Post-Hemodialysis Comments no problems

## 2024-09-30 NOTE — PLAN OF CARE
Per Alley with [facility] Pt is accepted;  [facility] will submit for auth to [employer], once Pt is more stable. Pt will need BERNABE drain & penrose drains removed prior to coming to rehab.  SW will continue to follow    09/30/24 1107   Post-Acute Status   Post-Acute Authorization Placement   Post-Acute Placement Status Pending payor review/awaiting authorization (if required)   Discharge Plan   Discharge Plan A Rehab   Discharge Plan B Rehab

## 2024-09-30 NOTE — ASSESSMENT & PLAN NOTE
This patient does have evidence of infective focus  My overall impression is sepsis.  Source: Abdominal  Antibiotics given-   Antibiotics (72h ago, onward)      Start     Stop Route Frequency Ordered    09/27/24 1015  linezolid 600 mg/300 mL IVPB 600 mg         -- IV Every 12 hours (non-standard times) 09/27/24 0909    09/24/24 1800  piperacillin-tazobactam 4.5 g in dextrose 5 % 100 mL IVPB (ready to mix)         -- IV Every 12 hours (non-standard times) 09/24/24 1035        fluconazole (DIFLUCAN) IVPB 200 mg  Start Date/Time (Original Order): 09/22/24 1500   Ordered Dose: 200 mg Route: Intravenous Frequency: Every 24 hours (non-standard times)       Source control achieved by: IV antibiotics and surgical intervention  =======================================================  Blood culture 09/07/2024: NGTD   Urine culture 09/19/2024: NGTD   Blood culture 09/24/2024:  NGTD   Left groin drainage culture 09/24/2024:  E coli only resistant to tetracycline, cefazolin, ampicillin and ceftriaxone  Abdomen incision site culture 09/24/2024:  E coli only sensitive to ceftriaxone and tetracycline  =====================================================  WBC continues trending down   Infectious disease following   Continue current antibiotic regimen

## 2024-10-01 PROBLEM — G47.00 INSOMNIA: Status: ACTIVE | Noted: 2024-10-01

## 2024-10-01 LAB
ALBUMIN SERPL BCP-MCNC: 3.4 G/DL (ref 3.5–5.2)
ALP SERPL-CCNC: 117 U/L (ref 55–135)
ALT SERPL W/O P-5'-P-CCNC: 36 U/L (ref 10–44)
ANION GAP SERPL CALC-SCNC: 17 MMOL/L (ref 8–16)
ANISOCYTOSIS BLD QL SMEAR: SLIGHT
AST SERPL-CCNC: 34 U/L (ref 10–40)
BACTERIA SPEC ANAEROBE CULT: ABNORMAL
BASOPHILS NFR BLD: 0 % (ref 0–1.9)
BILIRUB SERPL-MCNC: 0.4 MG/DL (ref 0.1–1)
BUN SERPL-MCNC: 44 MG/DL (ref 6–20)
CALCIUM SERPL-MCNC: 9.4 MG/DL (ref 8.7–10.5)
CHLORIDE SERPL-SCNC: 96 MMOL/L (ref 95–110)
CO2 SERPL-SCNC: 23 MMOL/L (ref 23–29)
CREAT SERPL-MCNC: 13 MG/DL (ref 0.5–1.4)
DIFFERENTIAL METHOD BLD: ABNORMAL
EOSINOPHIL NFR BLD: 0 % (ref 0–8)
ERYTHROCYTE [DISTWIDTH] IN BLOOD BY AUTOMATED COUNT: 21.5 % (ref 11.5–14.5)
EST. GFR  (NO RACE VARIABLE): 4.4 ML/MIN/1.73 M^2
GLUCOSE SERPL-MCNC: 109 MG/DL (ref 70–110)
HCT VFR BLD AUTO: 28.1 % (ref 40–54)
HGB BLD-MCNC: 8.3 G/DL (ref 14–18)
IMM GRANULOCYTES # BLD AUTO: ABNORMAL K/UL (ref 0–0.04)
IMM GRANULOCYTES NFR BLD AUTO: ABNORMAL % (ref 0–0.5)
LYMPHOCYTES NFR BLD: 10 % (ref 18–48)
MAGNESIUM SERPL-MCNC: 2.5 MG/DL (ref 1.6–2.6)
MCH RBC QN AUTO: 29.1 PG (ref 27–31)
MCHC RBC AUTO-ENTMCNC: 29.5 G/DL (ref 32–36)
MCV RBC AUTO: 99 FL (ref 82–98)
METAMYELOCYTES NFR BLD MANUAL: 1 %
MONOCYTES NFR BLD: 15 % (ref 4–15)
NEUTROPHILS NFR BLD: 64 % (ref 38–73)
NEUTS BAND NFR BLD MANUAL: 10 %
NRBC BLD-RTO: 1 /100 WBC
PLATELET # BLD AUTO: 505 K/UL (ref 150–450)
PMV BLD AUTO: 9.6 FL (ref 9.2–12.9)
POCT GLUCOSE: 124 MG/DL (ref 70–110)
POCT GLUCOSE: 132 MG/DL (ref 70–110)
POCT GLUCOSE: 138 MG/DL (ref 70–110)
POCT GLUCOSE: 166 MG/DL (ref 70–110)
POLYCHROMASIA BLD QL SMEAR: ABNORMAL
POTASSIUM SERPL-SCNC: 4.7 MMOL/L (ref 3.5–5.1)
PROT SERPL-MCNC: 8.6 G/DL (ref 6–8.4)
RBC # BLD AUTO: 2.85 M/UL (ref 4.6–6.2)
SODIUM SERPL-SCNC: 136 MMOL/L (ref 136–145)
WBC # BLD AUTO: 15.59 K/UL (ref 3.9–12.7)

## 2024-10-01 PROCEDURE — 85027 COMPLETE CBC AUTOMATED: CPT | Performed by: SURGERY

## 2024-10-01 PROCEDURE — 25000003 PHARM REV CODE 250: Performed by: SURGERY

## 2024-10-01 PROCEDURE — 94660 CPAP INITIATION&MGMT: CPT

## 2024-10-01 PROCEDURE — 36415 COLL VENOUS BLD VENIPUNCTURE: CPT | Performed by: SURGERY

## 2024-10-01 PROCEDURE — 97116 GAIT TRAINING THERAPY: CPT | Mod: CQ

## 2024-10-01 PROCEDURE — 25000003 PHARM REV CODE 250: Performed by: NURSE PRACTITIONER

## 2024-10-01 PROCEDURE — 99232 SBSQ HOSP IP/OBS MODERATE 35: CPT | Mod: ,,, | Performed by: INTERNAL MEDICINE

## 2024-10-01 PROCEDURE — 63600175 PHARM REV CODE 636 W HCPCS: Performed by: SURGERY

## 2024-10-01 PROCEDURE — 63600175 PHARM REV CODE 636 W HCPCS: Performed by: NURSE PRACTITIONER

## 2024-10-01 PROCEDURE — 94761 N-INVAS EAR/PLS OXIMETRY MLT: CPT

## 2024-10-01 PROCEDURE — 25000003 PHARM REV CODE 250: Performed by: INTERNAL MEDICINE

## 2024-10-01 PROCEDURE — 85007 BL SMEAR W/DIFF WBC COUNT: CPT | Performed by: SURGERY

## 2024-10-01 PROCEDURE — 80053 COMPREHEN METABOLIC PANEL: CPT | Performed by: SURGERY

## 2024-10-01 PROCEDURE — 83735 ASSAY OF MAGNESIUM: CPT | Performed by: SURGERY

## 2024-10-01 PROCEDURE — 97530 THERAPEUTIC ACTIVITIES: CPT

## 2024-10-01 PROCEDURE — 25000003 PHARM REV CODE 250

## 2024-10-01 PROCEDURE — 99900035 HC TECH TIME PER 15 MIN (STAT)

## 2024-10-01 PROCEDURE — 12000002 HC ACUTE/MED SURGE SEMI-PRIVATE ROOM

## 2024-10-01 PROCEDURE — 63600175 PHARM REV CODE 636 W HCPCS: Performed by: INTERNAL MEDICINE

## 2024-10-01 PROCEDURE — 94799 UNLISTED PULMONARY SVC/PX: CPT

## 2024-10-01 RX ORDER — HYDROMORPHONE HYDROCHLORIDE 1 MG/ML
0.5 INJECTION, SOLUTION INTRAMUSCULAR; INTRAVENOUS; SUBCUTANEOUS EVERY 12 HOURS PRN
Status: DISCONTINUED | OUTPATIENT
Start: 2024-10-01 | End: 2024-10-02

## 2024-10-01 RX ORDER — ALPRAZOLAM 0.25 MG/1
0.25 TABLET ORAL NIGHTLY PRN
Status: DISCONTINUED | OUTPATIENT
Start: 2024-10-01 | End: 2024-10-06 | Stop reason: HOSPADM

## 2024-10-01 RX ORDER — OXYCODONE HYDROCHLORIDE 10 MG/1
10 TABLET ORAL EVERY 6 HOURS PRN
Status: DISCONTINUED | OUTPATIENT
Start: 2024-10-01 | End: 2024-10-02

## 2024-10-01 RX ORDER — OXYCODONE AND ACETAMINOPHEN 10; 325 MG/1; MG/1
1 TABLET ORAL EVERY 4 HOURS PRN
Status: DISCONTINUED | OUTPATIENT
Start: 2024-10-01 | End: 2024-10-06 | Stop reason: HOSPADM

## 2024-10-01 RX ORDER — OXYMETAZOLINE HCL 0.05 %
2 SPRAY, NON-AEROSOL (ML) NASAL 2 TIMES DAILY
Status: DISPENSED | OUTPATIENT
Start: 2024-10-01 | End: 2024-10-04

## 2024-10-01 RX ORDER — GABAPENTIN 100 MG/1
100 CAPSULE ORAL 3 TIMES DAILY
Status: DISCONTINUED | OUTPATIENT
Start: 2024-10-01 | End: 2024-10-06 | Stop reason: HOSPADM

## 2024-10-01 RX ADMIN — ALPRAZOLAM 0.25 MG: 0.25 TABLET ORAL at 10:10

## 2024-10-01 RX ADMIN — OXYCODONE HYDROCHLORIDE AND ACETAMINOPHEN 1 TABLET: 7.5; 325 TABLET ORAL at 09:10

## 2024-10-01 RX ADMIN — FLUCONAZOLE 200 MG: 2 INJECTION, SOLUTION INTRAVENOUS at 10:10

## 2024-10-01 RX ADMIN — OXYCODONE HYDROCHLORIDE 10 MG: 10 TABLET ORAL at 06:10

## 2024-10-01 RX ADMIN — PIPERACILLIN SODIUM AND TAZOBACTAM SODIUM 4.5 G: 4; .5 INJECTION, POWDER, LYOPHILIZED, FOR SOLUTION INTRAVENOUS at 11:10

## 2024-10-01 RX ADMIN — GABAPENTIN 100 MG: 100 CAPSULE ORAL at 10:10

## 2024-10-01 RX ADMIN — HEPARIN SODIUM 7500 UNITS: 5000 INJECTION INTRAVENOUS; SUBCUTANEOUS at 02:10

## 2024-10-01 RX ADMIN — LINEZOLID 600 MG: 600 INJECTION, SOLUTION INTRAVENOUS at 10:10

## 2024-10-01 RX ADMIN — ISOSORBIDE MONONITRATE 30 MG: 30 TABLET, EXTENDED RELEASE ORAL at 09:10

## 2024-10-01 RX ADMIN — OXYCODONE HYDROCHLORIDE 10 MG: 10 TABLET ORAL at 01:10

## 2024-10-01 RX ADMIN — HYDROMORPHONE HYDROCHLORIDE 0.5 MG: 1 INJECTION, SOLUTION INTRAMUSCULAR; INTRAVENOUS; SUBCUTANEOUS at 12:10

## 2024-10-01 RX ADMIN — HYDRALAZINE HYDROCHLORIDE 100 MG: 25 TABLET ORAL at 10:10

## 2024-10-01 RX ADMIN — HEPARIN SODIUM 7500 UNITS: 5000 INJECTION INTRAVENOUS; SUBCUTANEOUS at 05:10

## 2024-10-01 RX ADMIN — LINEZOLID 600 MG: 600 INJECTION, SOLUTION INTRAVENOUS at 11:10

## 2024-10-01 RX ADMIN — GABAPENTIN 200 MG: 100 CAPSULE ORAL at 09:10

## 2024-10-01 NOTE — PLAN OF CARE
Notified Alley that per general surgeon, pinrose drain may be pulled today or tomorrow, she states auth has been requested and is pending at this time.        10/01/24 1056   Post-Acute Status   Post-Acute Authorization Placement   Post-Acute Placement Status Pending payor review/awaiting authorization (if required)

## 2024-10-01 NOTE — ASSESSMENT & PLAN NOTE
Creatine stable for now. BMP reviewed- noted Estimated Creatinine Clearance: 10.7 mL/min (A) (based on SCr of 13 mg/dL (H)). according to latest data. Based on current GFR, CKD stage is end stage.  Monitor UOP and serial BMP and adjust therapy as needed. Renally dose meds. Avoid nephrotoxic medications and procedures.  Nephrology following  Dialysis management per Nephrology  Renal diet

## 2024-10-01 NOTE — NURSING
Per Dr Connors new orders received to remove both penrose drains. PRN pain medication given prior to removing per patient request. Removed 2 penrose drains, with minimal discomfort. Cleansed area with NS and gauze, abd bandage in place for drainage.

## 2024-10-01 NOTE — ASSESSMENT & PLAN NOTE
W/ Colonic perforation with abscess  CT abdomen shows Left inguinal hernia containing fat and air as well as marked inflammation extending from inside the pelvis, in the hernia and down into the left scrotum.   S/P Robotic sigmoid resection, Mobilization of splenic flexure, left inguinal canal exploration, and I&D 9/17 per Dr. Connors  Continue regular diet:  Started 9/30  Pain uncontrolled:  Increase Percocet to 10 mg prn, add Oxy IR 10 mg for breakthrough pain, start weaning IV Dilaudid

## 2024-10-01 NOTE — CONSULTS
Incision to pubis with sutures at the 3 oclock position, sutures x 2. Raised on upper aspect, red granulation visible.  Cleaned and dried and covered with abd pad. Abdominal incision with silver dressing patient watched ostomy education DVD a couple of times.   Ostomy with small amount of soft liquid brown stool.  Bilateral buttock without breakdown, skin assessment done.

## 2024-10-01 NOTE — PLAN OF CARE
Problem: Occupational Therapy  Goal: Occupational Therapy Goal  Description: Goals to be met by: 10/26/2024     Patient will increase functional independence with ADLs by performing:    UE Dressing with Supervision.  LE Dressing with Stand-by Assistance.  Grooming while standing at sink with Supervision.  Toileting from toilet with Supervision for hygiene and clothing management.   Supine to sit with Stand-by Assistance.  Toilet transfer to toilet with Supervision.    Outcome: Progressing

## 2024-10-01 NOTE — ASSESSMENT & PLAN NOTE
Patient with Hypoxic Respiratory failure which is Acute.  he is not on home oxygen. Supplemental oxygen was provided and noted- Oxygen Concentration (%):  [40] 40  Continue nightly BiPAP and supplemental O2, weaning as tolerated.  Pulmonology signed off 9/29    ABG  Recent Labs   Lab 09/25/24  1051   PH 7.404   PO2 87   PCO2 37.7   HCO3 23.6*   BE -1     =================================  Having bloody postnasal drainage. Start nasal saline spray t.i.d..

## 2024-10-01 NOTE — ASSESSMENT & PLAN NOTE
Hyperkalemia is likely due to ESRD.The patients most recent potassium results are listed below.  Recent Labs     09/30/24  0627 10/01/24  0546   K 4.0 4.7       Plan  - Monitor for arrhythmias with EKG and/or continuous telemetry.   - Treat the hyperkalemia with Potassium Binders and hemodialysis .   - Monitor potassium: Daily  - The patient's hyperkalemia is resolved

## 2024-10-01 NOTE — CARE UPDATE
09/30/24 2003   Patient Assessment/Suction   Level of Consciousness (AVPU) alert   Respiratory Effort Normal;Unlabored   Expansion/Accessory Muscles/Retractions no use of accessory muscles   All Lung Fields Breath Sounds equal bilaterally;clear   Cough Frequency no cough   Skin Integrity   $ Wound Care Tech Time 15 min   Area Observed Bridge of nose   Skin Appearance without discoloration   PRE-TX-O2   Device (Oxygen Therapy) room air   SpO2 95 %   Pulse Oximetry Type Intermittent   $ Pulse Oximetry - Multiple Charge Pulse Oximetry - Multiple   Pulse 102   Resp 16   Aerosol Therapy   $ Aerosol Therapy Charges PRN treatment not required   Preset CPAP/BiPAP Settings   Mode Of Delivery BiPAP;Standby   Education   $ Education BiPAP;Bronchodilator;Oxygen;15 min

## 2024-10-01 NOTE — PT/OT/SLP PROGRESS
Physical Therapy Treatment    Patient Name:  João Ham   MRN:  [id number]    Recommendations:     Discharge Recommendations: High Intensity Therapy  Discharge Equipment Recommendations: to be determined by next level of care  Barriers to discharge: None    Assessment:     João Ham is a 44 y.o. male admitted with a medical diagnosis of Inguinal hernia of left side with gangrene.  He presents with the following impairments/functional limitations: impaired endurance, weakness, impaired functional mobility, gait instability, decreased lower extremity function, pain, impaired cardiopulmonary response to activity . Direct hand off from OT with patient standing in rw. Gait trials performed x2 requiring w/c follow secondary to muscle fatigue. Pt returned to bed with HOB elevated, requiring mod a for B LE management.     Rehab Prognosis: Fair; patient would benefit from acute skilled PT services to address these deficits and reach maximum level of function.    Recent Surgery: Procedure(s) (LRB):  LAPAROTOMY, EXPLORATORY (N/A)  COLON RESECTION  MOBILIZATION, SPLENIC FLEXURE 6 Days Post-Op    Plan:     During this hospitalization, patient to be seen daily to address the identified rehab impairments via therapeutic activities, therapeutic exercises, gait training and progress toward the following goals:    Plan of Care Expires:  10/26/24    Subjective     Chief Complaint: Incision pain  Patient/Family Comments/goals: Pt agreeable to PT.   Pain/Comfort:  Pain Rating 1: 5/10  Location - Orientation 1: generalized  Location 1: abdomen  Pain Addressed 1: Cessation of Activity, Reposition  Pain Rating Post-Intervention 1: 5/10      Objective:     Communicated with RN prior to session.  Patient found  standing with OT  with peripheral IV, telemetry upon PT entry to room.     General Precautions: Standard, fall  Orthopedic Precautions:    Braces:    Respiratory Status: Room air     Functional Mobility:  Bed Mobility:     Sit to  Supine: moderate assistance  Gait: 60' x 2 CGA rw with w/c follow       AM-PAC 6 CLICK MOBILITY          Treatment & Education:  Pt was educated on the following: call light use, importance of OOB activity and functional mobility to negate the negative effects of prolonged bed rest during this hospitalization, safe transfers/ambulation and discharge planning recommendations/options.     Patient left HOB elevated with bed alarm on..    GOALS:   Multidisciplinary Problems       Physical Therapy Goals          Problem: Physical Therapy    Goal Priority Disciplines Outcome Interventions   Physical Therapy Goal     PT, PT/OT Progressing    Description: 1. Supine to sit with Stand-by Assistance  2. Sit to stand transfer with Stand-by Assistance  3.. Bed to chair transfer with Supervision using Rolling Walker  4. Gait  x 100 feet with Minimal Assistance using least restrictive assistive device.                          Time Tracking:     PT Received On: 10/01/24  PT Start Time: 1103     PT Stop Time: 1116  PT Total Time (min): 13 min     Billable Minutes: Gait Training 13    Treatment Type: Treatment  PT/PTA: PTA     Number of PTA visits since last PT visit: 3     10/01/2024

## 2024-10-01 NOTE — PT/OT/SLP PROGRESS
"Occupational Therapy   Treatment    Name: João Ham  MRN: [id number]  Admitting Diagnosis:  Inguinal hernia of left side with gangrene  6 Days Post-Op    Recommendations:     Discharge Recommendations: High Intensity Therapy  Discharge Equipment Recommendations:   (TBD)  Barriers to discharge:  Decreased caregiver support    Assessment:     João Ham is a 44 y.o. male with a medical diagnosis of Inguinal hernia of left side with gangrene.  Performance deficits affecting function are weakness, impaired endurance, impaired self care skills, impaired functional mobility, gait instability, impaired balance, pain.     Pt presented supine; he reported abdominal pain but was motivated to mobilize; he required Mod A to transition to sitting EOB; pt stood CGA for UB dressing and walked a short distance in the room CGA with RW; he was feeling well/motivated to walk in the hallway; pt left up with PTA.      Rehab Prognosis:  Fair; patient would benefit from acute skilled OT services to address these deficits and reach maximum level of function.       Plan:     Patient to be seen 5 x/week to address the above listed problems via self-care/home management, therapeutic activities, therapeutic exercises  Plan of Care Expires: 10/26/24  Plan of Care Reviewed with: patient    Subjective     Chief Complaint: abdominal pain   Patient/Family Comments/goals: "I want to move around some."  Pain/Comfort:  Pain Rating 1: 5/10  Location - Side 1: Bilateral  Location - Orientation 1: generalized  Location 1: abdomen  Pain Addressed 1: Reposition, Distraction  Pain Rating Post-Intervention 1: 5/10    Objective:     Communicated with: nurse prior to session.  Patient found supine with telemetry, BERNABE drain, colostomy, peripheral IV upon OT entry to room.    General Precautions: Standard, fall    Orthopedic Precautions:N/A  Braces: N/A  Respiratory Status: Room air     Occupational Performance:     Bed Mobility:    Patient completed Supine to " Sit with moderate assistance    Functional Mobility/Transfers:  Patient completed Sit <> Stand Transfer with contact guard assistance  with  rolling walker   Functional Mobility: pt walked from the EOB to the door with CGA with RW; pt feeling well and wanted to mobilize out in the foster; pt left standing with PTA to initiate gait training in hallway     Activities of Daily Living:  Upper Body Dressing: contact guard assistance while donning back gown in standing     Treatment & Education:  Therapist provided facilitation and instruction of proper body mechanics and fall prevention strategies during tasks listed above.   Instructed patient to sit in bedside chair as tolerated daily to increase OOB/activity tolerance.   Instructed patient to use call light to have nursing staff assist with needs/transfers.   Discussed OT POC and answered all questions within OT scope of practice.    Patient left  standing at doorway with PTA  with all lines intact    GOALS:   Multidisciplinary Problems       Occupational Therapy Goals          Problem: Occupational Therapy    Goal Priority Disciplines Outcome Interventions   Occupational Therapy Goal     OT, PT/OT     Description: Goals to be met by: 10/26/2024     Patient will increase functional independence with ADLs by performing:    UE Dressing with Supervision.  LE Dressing with Stand-by Assistance.  Grooming while standing at sink with Supervision.  Toileting from toilet with Supervision for hygiene and clothing management.   Supine to sit with Stand-by Assistance.  Toilet transfer to toilet with Supervision.                         Time Tracking:     OT Date of Treatment: 10/01/24  OT Start Time: 1051  OT Stop Time: 1100  OT Total Time (min): 9 min    Billable Minutes:Therapeutic Activity 09    OT/FRANK: OT          10/1/2024

## 2024-10-01 NOTE — PROGRESS NOTES
Pt seen and examined.   Resting comfortably  NO n/v.  Tolerating diet. Having ostomy output    Wt Readings from Last 3 Encounters:   09/18/24 134 kg (295 lb 6.7 oz)   08/29/24 131.3 kg (289 lb 7.4 oz)   08/22/24 131 kg (288 lb 12.8 oz)     Temp Readings from Last 3 Encounters:   10/01/24 99.1 °F (37.3 °C) (Oral)   08/22/24 97.3 °F (36.3 °C) (Temporal)   07/30/24 98.6 °F (37 °C) (Oral)     BP Readings from Last 3 Encounters:   10/01/24 (!) 128/90   08/29/24 99/68   08/22/24 (!) 137/91     Pulse Readings from Last 3 Encounters:   10/01/24 (!) 111   08/29/24 (!) 115   08/22/24 110     AAOx3  Soft/nd/nt  Ostomy pink and viable  Penrose inp lace    Lab Results   Component Value Date    WBC 15.59 (H) 10/01/2024    HGB 8.3 (L) 10/01/2024    HCT 28.1 (L) 10/01/2024    MCV 99 (H) 10/01/2024     (H) 10/01/2024       BMP  Lab Results   Component Value Date     10/01/2024    K 4.7 10/01/2024    CL 96 10/01/2024    CO2 23 10/01/2024    BUN 44 (H) 10/01/2024    CREATININE 13.0 (H) 10/01/2024    CALCIUM 9.4 10/01/2024    ANIONGAP 17 (H) 10/01/2024    EGFRNORACEVR 4.4 (A) 10/01/2024     A/P: s/p Benítez's  Ok to cont regular diet  D/w nursing.  Have instructed to remove penrose drain

## 2024-10-01 NOTE — PROGRESS NOTES
[facility]  Progress Note    Patient Name: João Ham  MRN: [id number]  Patient Class: IP- Inpatient   Admission Date: 9/17/2024  Length of Stay: 14 days  Attending Physician: Ag Reyes, *  Primary Care Provider: Dawson Granado MD        Subjective:     Principal Problem:Inguinal hernia of left side with gangrene        HPI:  Patient is a 44 year old male with history of ESDR on dialysis MWF, awaiting kidney transplant, HTN, presented to ED with 3 days history of left groin pain and swelling. States swelling comes every time he cough and is being painful. Patient has low grade fever 99s at home. He has been vomiting bilious fluid last 2-3 days. No diarrhea or abdominal pain.     In the ED CT abdomen showed Left inguinal hernia containing fat and air as well as marked inflammation extending from inside the pelvis, in the hernia and down into the left scrotum. This is consistent with an infected inguinal hernia, possible gangrene. WBC was 14, patient was tachycardic. General surgery was consulted and patient was admitted under hospitalist.     Overview/Hospital Course:  Patient is a 44 year old male with history of ESRD, was admitted with suspected inguinal hernia gangrene. General surgery was consulted and patient was taken to OR. Found to have colonic perforation due to mesh erosion with an abscess. Patient underwent sigmoid resection and drainage of the abscess, was started on clindamycin, Vancomycin and cefepime. Nephrology was consulted for chronic dialysis. AVF was not functioning, General surgery consulted, trialysis catheter placed. PRBC transfused during dialysis for low Hb 6.6.  While on broad-spectrum coverage, patient is spiking fevers and worsening leukocytosis, id consulted, discontinued clindamycin, vancomycin and cefepime-added daptomycin, Diflucan and Zosyn.  Repeated CT abdomen and pelvis that showed residual abscess/phlegmon and contained  perforation. Re-consulted surgery who mentioned likely post op changes. Fistulogram 9/24 by vascular surgery for declotting the AV fistula. Trialysis removed 9/25. Had worsening white count, fevers upto 103.5 and hypotension overnight 9/25.  Also had hemoglobin of 7, with worsening hypoxia up to 7 L (overnight desaturated to 70% with lethargy requiring BiPAP placement), after which nephrology recommended 1 unit PRBC transfusion.  Id, Nephrology, Urology, General surgery were updated about his worsening condition.We CT repeated that showed intraperitoneal free air and findings suggestive of necrotizing fasciitis, general surgery was consulted stat, patient was transferred to ICU.  The surgeon performed open laparotomy and noted fecal contamination of the left lower quadrant indicative of anastomotic disruption.  Colon was resected, and colostomy was created. Patient was placed on full liquid diet, was unable to tolerate and downgraded back to clear liquid diet.  Also complaining of excess mucus and cough.  Patient encouraged to use incentive spirometer as directed, and press pillow to abdomen when coughing.  Pulmonology signed off on 09/29 with recommendation to continue BiPAP nightly and with naps.  PT/OT recommended high-intensity therapy due to patient's deconditioning.    Interval History:  Lying in bed, awake and alert.  Just finished getting bath. Had dialysis yesterday, 4 L fluid removed.  Getting regular diet. Complains of pain being uncontrolled.  Hasdsmall amount of bloody mucus that he spits out of his mouth, he thinks it was postnasal drainage.  He save it in a cup at bedside. Mucous examined, very thick and dark red. Reports not sleeping well and requests something for insomnia.     Review of Systems   Constitutional:  Positive for activity change, appetite change and fatigue.   Gastrointestinal:  Positive for abdominal pain (Surgical) and nausea (Improving).        Colostomy   Genitourinary:          Dialysis.  Essentially anuric.   Musculoskeletal:  Positive for arthralgias, gait problem and myalgias.   Skin:  Positive for wound (Surgical).   Neurological:  Positive for weakness (Generalized).   Psychiatric/Behavioral:  Positive for sleep disturbance.    All other systems reviewed and are negative.    Objective:     Vital Signs (Most Recent):  Temp: 98.1 °F (36.7 °C) (10/01/24 0330)  Pulse: 91 (10/01/24 0330)  Resp: 20 (10/01/24 0330)  BP: 123/73 (10/01/24 0330)  SpO2: 97 % (10/01/24 0330) Vital Signs (24h Range):  Temp:  [97.7 °F (36.5 °C)-99.2 °F (37.3 °C)] 98.1 °F (36.7 °C)  Pulse:  [] 91  Resp:  [16-20] 20  SpO2:  [93 %-100 %] 97 %  BP: (111-155)/(60-78) 123/73     Weight: 134 kg (295 lb 6.7 oz)  Body mass index is 37.93 kg/m².    Intake/Output Summary (Last 24 hours) at 10/1/2024 0741  Last data filed at 10/1/2024 0652  Gross per 24 hour   Intake 1240 ml   Output 4750 ml   Net -3510 ml         Physical Exam  Constitutional:       General: He is not in acute distress.     Appearance: He is obese.   Eyes:      Extraocular Movements: Extraocular movements intact.      Conjunctiva/sclera: Conjunctivae normal.      Pupils: Pupils are equal, round, and reactive to light.   Cardiovascular:      Rate and Rhythm: Normal rate and regular rhythm.      Heart sounds: Normal heart sounds.      Arteriovenous access: Left arteriovenous access is present.  Pulmonary:      Effort: Pulmonary effort is normal. No respiratory distress.      Breath sounds: Normal breath sounds.   Abdominal:      General: There is distension (Mild).      Tenderness: There is generalized abdominal tenderness (postop pain).      Comments: Colostomy intact to LLQ, stoma pink and moist   Musculoskeletal:         General: No swelling. Normal range of motion.      Cervical back: Normal range of motion and neck supple.   Skin:     General: Skin is warm and dry.      Capillary Refill: Capillary refill takes less than 2 seconds.      Comments:  Midline abdominal surgical dressing CDI.  BERNABE drain intact with serosanguineous drainage.  Left groin dressing CDI with a Penrose.   Neurological:      General: No focal deficit present.      Mental Status: He is alert and oriented to person, place, and time.      Motor: Weakness (Generalized) present.             Significant Labs: All pertinent labs within the past 24 hours have been reviewed.  CBC:   Recent Labs   Lab 09/29/24  1110 09/30/24  0627 10/01/24  0546   WBC 19.88* 15.43* 15.59*   HGB 8.5* 7.4* 8.3*   HCT 27.4* 24.3* 28.1*   * 482* 505*     CMP:   Recent Labs   Lab 09/30/24  0627 10/01/24  0546   * 136   K 4.0 4.7   CL 94* 96   CO2 22* 23    109   BUN 63* 44*   CREATININE 15.5* 13.0*   CALCIUM 8.7 9.4   PROT 7.8 8.6*   ALBUMIN 3.0* 3.4*   BILITOT 0.4 0.4   ALKPHOS 114 117   AST 43* 34   ALT 38 36   ANIONGAP 18* 17*     Magnesium:   Recent Labs   Lab 09/30/24  0627 10/01/24  0546   MG 2.5 2.5     Microbiology Results (last 7 days)       Procedure Component Value Units Date/Time    Blood culture [[id number]] Collected: 09/25/24 1111    Order Status: Completed Specimen: Blood from Peripheral, Antecubital, Right Updated: 09/30/24 1432     Blood Culture, Routine No growth after 5 days.    Narrative:      [id number]    Blood culture [[id number]] Collected: 09/25/24 1111    Order Status: Completed Specimen: Blood from Peripheral, Forearm, Right Updated: 09/30/24 1432     Blood Culture, Routine No growth after 5 days.    Narrative:      [id number]    Blood culture [[id number]] Collected: 09/24/24 1513    Order Status: Completed Specimen: Blood Updated: 09/29/24 1632     Blood Culture, Routine No growth after 5 days.    Narrative:      Collection has been rescheduled by CLCAmanda at 09/23/2024 14:35 Reason:   Patient unavailable dialysis   Collection has been rescheduled by MYB at 09/23/2024 18:44 Reason:   Unable to collect  Collection has been rescheduled by CZB at 09/23/2024 19:02 Reason:   Unable to  collect  Collection has been rescheduled by RE1 at 09/24/2024 09:43 Reason:   Spoke to the patient's nurse about unable to collect she is   contacting Lambert and the doctor  Collection has been rescheduled by [email or web address] at 09/23/2024 14:35 Reason:   Patient unavailable dialysis   Collection has been rescheduled by MYB at 09/23/2024 18:44 Reason:   Unable to collect  Collection has been rescheduled by CZB at 09/23/2024 19:02 Reason:   Unable to collect  Collection has been rescheduled by RE1 at 09/24/2024 09:43 Reason:   Spoke to the patient's nurse about unable to collect she is   contacting Lambert and the doctor    Blood culture [[id number]] Collected: 09/24/24 1122    Order Status: Completed Specimen: Blood Updated: 09/29/24 1232     Blood Culture, Routine No growth after 5 days.    Narrative:      Collection has been rescheduled by [email or web address] at 09/23/2024 14:35 Reason:   Patient unavailable dialysis   Collection has been rescheduled by MYB at 09/23/2024 18:44 Reason:   Unable to collect  Collection has been rescheduled by CZB at 09/23/2024 19:02 Reason:   Unable to collect  Collection has been rescheduled by RE1 at 09/24/2024 09:43 Reason:   Spoke to the patient's nurse about unable to collect she is   contacting Lambert and the doctor  Collection has been rescheduled by [email or web address] at 09/23/2024 14:35 Reason:   Patient unavailable dialysis   Collection has been rescheduled by MYB at 09/23/2024 18:44 Reason:   Unable to collect  Collection has been rescheduled by CZB at 09/23/2024 19:02 Reason:   Unable to collect  Collection has been rescheduled by RE1 at 09/24/2024 09:43 Reason:   Spoke to the patient's nurse about unable to collect she is   contacting Lambert and the doctor    AFB Culture & Smear [[id number]] Collected: 09/25/24 1413    Order Status: Completed Specimen: Incision site from Abdomen Updated: 09/28/24 1630     AFB CULTURE STAIN No acid fast bacilli seen.     AFB CULTURE STAIN Testing performed by:     AFB CULTURE STAIN Lab  [facility] [place]     AFB CULTURE STAIN [address] [facility]     [place] [address]     AFB CULTURE STAIN Dr. Osbaldo Sherwood MD    Culture, Anaerobe [[id number]] Collected: 09/25/24 1413    Order Status: Completed Specimen: Incision site from Abdomen Updated: 09/28/24 1405     Anaerobic Culture Culture in progress    IV catheter culture [[id number]] Collected: 09/25/24 1132    Order Status: Completed Specimen: Catheter Tip, Dialysis Updated: 09/28/24 1113     Aerobic Culture - Cath tip No growth    Narrative:      Trialysis    Aerobic culture [[id number]]  (Abnormal)  (Susceptibility) Collected: 09/24/24 1535    Order Status: Completed Specimen: Incision site from Groin Updated: 09/28/24 0738     Aerobic Bacterial Culture ESCHERICHIA COLI  From broth only      Narrative:      Left groin incision site drainage swab    Aerobic culture [[id number]]  (Abnormal)  (Susceptibility) Collected: 09/25/24 1413    Order Status: Completed Specimen: Incision site from Abdomen Updated: 09/28/24 0737     Aerobic Bacterial Culture ESCHERICHIA COLI  From broth only      Gram stain [[id number]] Collected: 09/25/24 1413    Order Status: Completed Specimen: Incision site from Abdomen Updated: 09/27/24 1530     Gram Stain Result Moderate WBC's      No organisms seen    Fungus culture [[id number]] Collected: 09/25/24 1413    Order Status: Sent Specimen: Incision site from Abdomen Updated: 09/25/24 1652    Gram stain [[id number]] Collected: 09/24/24 1535    Order Status: Completed Specimen: Incision site from Groin Updated: 09/24/24 1829     Gram Stain Result Few WBC's      No organisms seen    Narrative:      Left groin incision site drainage          Cardiac catheterization  Procedure performed in the Invasive Lab    - See Procedure Log link below for nursing documentation    - See OpNote on Surgeries Tab for physician findings    - See Imaging Tab for radiologist dictation        Significant Imaging: I have  reviewed all pertinent imaging results/findings within the past 24 hours.    Assessment/Plan:      * Inguinal hernia of left side with gangrene  W/ Colonic perforation with abscess  CT abdomen shows Left inguinal hernia containing fat and air as well as marked inflammation extending from inside the pelvis, in the hernia and down into the left scrotum.   S/P Robotic sigmoid resection, Mobilization of splenic flexure, left inguinal canal exploration, and I&D 9/17 per Dr. Connors  Continue regular diet:  Started 9/30  Pain uncontrolled:  Increase Percocet to 10 mg prn, add Oxy IR 10 mg for breakthrough pain, start weaning IV Dilaudid    Sepsis  This patient does have evidence of infective focus  My overall impression is sepsis.  Source: Abdominal  Antibiotics given-   Antibiotics (72h ago, onward)      Start     Stop Route Frequency Ordered    09/27/24 1015  linezolid 600 mg/300 mL IVPB 600 mg         -- IV Every 12 hours (non-standard times) 09/27/24 0909    09/24/24 1800  piperacillin-tazobactam 4.5 g in dextrose 5 % 100 mL IVPB (ready to mix)         -- IV Every 12 hours (non-standard times) 09/24/24 1035        fluconazole (DIFLUCAN) IVPB 200 mg  Start Date/Time (Original Order): 09/22/24 1500   Ordered Dose: 200 mg Route: Intravenous Frequency: Every 24 hours (non-standard times)       Source control achieved by: IV antibiotics and surgical intervention  =======================================================  Blood culture 09/07/2024: NGTD   Urine culture 09/19/2024: NGTD   Blood culture 09/24/2024:  NGTD   Left groin drainage culture 09/24/2024:  E coli only resistant to tetracycline, cefazolin, ampicillin and ceftriaxone  Abdomen incision site culture 09/24/2024:  E coli only sensitive to ceftriaxone and tetracycline  =====================================================  WBC continues trending down   Infectious disease following   Continue current antibiotic regimen    Perforation of intestine  S/p open  laparotomy with resection of affected colon and creation of colostomy 9/25  ID following: Continue IV Zyvox, IV Zosyn, and Diflucan  Continue mobilization as tolerated:  Out of bed to chair t.i.d. with meals  PT/OT following  Continue regular diet  Pain management    Orchitis  Urology consulted: no surgical/invasive intervention recommended  S/P left inguinal canal exploration with Penrose drain placed in the inguinal canal 9/25 per general surgeon.  Continue antibiotics per Infectious Disease    Debility  Patient with Acute debility due to bed confinement status and prolonged hospitalization, and infection . Latest AMPAC and GEMS scores have not been reviewed. Plan includes progressive mobility protocol initated, PT/OT consulted, fall precautions in place, and CM following for discharge planning .  Accepted to [facility] rehab  Awaiting medical stability to be discharged    Insomnia  Add Xanax 0.25 mg q.h.s. PRN insomnia      Hyperkalemia  Hyperkalemia is likely due to ESRD.The patients most recent potassium results are listed below.  Recent Labs     09/30/24  0627 10/01/24  0546   K 4.0 4.7       Plan  - Monitor for arrhythmias with EKG and/or continuous telemetry.   - Treat the hyperkalemia with Potassium Binders and hemodialysis .   - Monitor potassium: Daily  - The patient's hyperkalemia is resolved    Acute hypoxic respiratory failure  Patient with Hypoxic Respiratory failure which is Acute.  he is not on home oxygen. Supplemental oxygen was provided and noted- Oxygen Concentration (%):  [40] 40  Continue nightly BiPAP and supplemental O2, weaning as tolerated.  Pulmonology signed off 9/29    ABG  Recent Labs   Lab 09/25/24  1051   PH 7.404   PO2 87   PCO2 37.7   HCO3 23.6*   BE -1     =================================  Having bloody postnasal drainage. Start nasal saline spray t.i.d..    Essential hypertension  Chronic, controlled. Latest blood pressure and vitals reviewed-     Temp:  [97.7 °F (36.5 °C)-99.2  °F (37.3 °C)]   Pulse:  []   Resp:  [16-20]   BP: (111-148)/(60-94)   SpO2:  [91 %-100 %] .   Home meds for hypertension were reviewed and noted below.   Hypertension Medications               cloNIDine (CATAPRES) 0.3 MG tablet TAKE 1 TABLET BY MOUTH THREE TIMES DAILY    hydrALAZINE (APRESOLINE) 100 MG tablet Take 1 tablet (100 mg total) by mouth 3 (three) times daily.    isosorbide mononitrate (IMDUR) 120 MG 24 hr tablet Take 120 mg by mouth once daily.    metoprolol succinate (TOPROL-XL) 50 MG 24 hr tablet Take 150 mg by mouth once daily.    olmesartan (BENICAR) 40 MG tablet Take 1 tablet by mouth once daily        While in the hospital, will manage blood pressure as follows; Continue current antihypertensive regimen: hydralazine, losartan, clonidine, and metoprolol.  Patient was apparently getting nitro paste q.6 hours.  This was discontinued yesterday and his home isosorbide was resumed.    Will utilize p.r.n. blood pressure medication only if patient's blood pressure greater than 180/110 and he develops symptoms such as worsening chest pain or shortness of breath.    Sleep apnea  Currently getting BiPAP nightly while in the hospital per pulmonology    Pulmonary hypertension  Chronic.  No need to address acutely.    Severe obesity with body mass index (BMI) of 35.0 to 39.9 with comorbidity  Body mass index is 37.93 kg/m². Morbid obesity complicates all aspects of disease management from diagnostic modalities to treatment. Weight loss encouraged and health benefits explained to patient.     Anemia due to chronic kidney disease, on chronic dialysis  Anemia is likely due to acute blood loss which was from surgery and chronic disease due to ESRD. Most recent hemoglobin and hematocrit are listed below.  Recent Labs     09/29/24  1110 09/30/24  0627 10/01/24  0546   HGB 8.5* 7.4* 8.3*   HCT 27.4* 24.3* 28.1*     Plan  - Monitor serial CBC: Daily  - Transfuse PRBC if patient becomes hemodynamically unstable,  symptomatic or H/H drops below 7/21.  - Patient has received 3 units of PRBCs  - Patient's anemia is currently stable    ESRD (end stage renal disease)  Creatine stable for now. BMP reviewed- noted Estimated Creatinine Clearance: 10.7 mL/min (A) (based on SCr of 13 mg/dL (H)). according to latest data. Based on current GFR, CKD stage is end stage.  Monitor UOP and serial BMP and adjust therapy as needed. Renally dose meds. Avoid nephrotoxic medications and procedures.  Nephrology following  Dialysis management per Nephrology  Renal diet      VTE Risk Mitigation (From admission, onward)           Ordered     heparin (porcine) injection 4,000 Units  As needed (PRN)         09/20/24 1544     heparin (porcine) injection 7,500 Units  Every 8 hours         09/17/24 1031     IP VTE HIGH RISK PATIENT  Once         09/17/24 1031     Place sequential compression device  Until discontinued         09/17/24 1031                    Discharge Planning   MARIA ISABEL: 10/3/2024     Code Status: Full Code   Is the patient medically ready for discharge?:     Reason for patient still in hospital (select all that apply): Patient unstable, Patient trending condition, and Treatment  Discharge Plan A: Rehab   Discharge Delays: None known at this time              Tessy Lozano NP  Department of Hospital Medicine   [facility]

## 2024-10-01 NOTE — ASSESSMENT & PLAN NOTE
Patient with Acute debility due to bed confinement status and prolonged hospitalization, and infection . Latest AMPAC and GEMS scores have not been reviewed. Plan includes progressive mobility protocol initated, PT/OT consulted, fall precautions in place, and CM following for discharge planning .  Accepted to [facility] rehab  Awaiting medical stability to be discharged

## 2024-10-01 NOTE — PROGRESS NOTES
[facility] [place]   Department of Infectious Disease  Progress Note        PATIENT NAME: João Ham  MRN: [id number]  TODAY'S DATE: 10/01/2024  ADMIT DATE: 9/17/2024  LOS: 14 days    CHIEF COMPLAINT: Fatigue and Groin Swelling (X 2 days.  Hx of Kidney failure and CHF)      PRINCIPLE PROBLEM: Inguinal hernia of left side with gangrene    INTERVAL HISTORY      09/23/2024: Seen and evaluated at bedside.  No acute issues.  Leukocytosis persists.  Afebrile.      09/24/2024:  Leukocytosis.  Remains afebrile.  Fluid in BERNABE drain remains dark green.  Seen by vascular surgeon with plan for left upper extremity AV fistula fistulogram.    09/25/2024: Leukocytosis persists.  He is otherwise stable.  Left inguinal drainage fluid sent for Gram stain and culture.  G stain was negative.  Culture in progress.  He remains on broad-spectrum antimicrobials.  He was seen by urologist yesterday with plan for conservative management of left scrotal edema with orchitis.  Repeat CT this morning showed gas in the abdomen resident concern for anastomotic leak.     09/26/2024:  He had exploratory laparotomy yesterday with colon resection and end colostomy, mobilization of splenic flexure and left inguinal canal exploration.  A Penrose drain was placed in the inguinal canal.  Left IJ Vas-Cath was removed yesterday.    09/27/2024:  Leukocytosis.  Fever cough trending down.  Cultures from surgery so far negative.  Pain control better.    09/28/2024:  WBC back up to 24 K. cultures have grown E coli so far with 1 strain resistant to ceftriaxone and tetracycline.  Having nausea and is on clear liquid diet.  States he is Not feeling good.    09/29/2024:  He is feeling much better.  WBC down to 20 K.  Tolerating oral feeds.  KUB showed gas-filled bowel loops on the left side concerning for ileus.  Also showed lateral abdominal wall gas.      09/30/2024:  Continues to improve.  WBC down to 15 K.  No other acute issues overnight.    10/01/2024:  Seen and evaluated at bedside.  Continues to improve.  No acute issues overnight.  Tolerating oral feeds.    Antibiotics (From admission, onward)      Start     Stop Route Frequency Ordered    09/27/24 1015  linezolid 600 mg/300 mL IVPB 600 mg         -- IV Every 12 hours (non-standard times) 09/27/24 0909    09/24/24 1800  piperacillin-tazobactam 4.5 g in dextrose 5 % 100 mL IVPB (ready to mix)         -- IV Every 12 hours (non-standard times) 09/24/24 1035    09/17/24 2100  mupirocin 2 % ointment         09/22/24 2059 Nasl 2 times daily 09/17/24 1845          Antifungals (From admission, onward)      Start     Stop Route Frequency Ordered    09/22/24 1930  fluconazole (DIFLUCAN) IVPB 200 mg         -- IV Every 24 hours (non-standard times) 09/22/24 1520           Antivirals (From admission, onward)      None            ASSESSMENT and PLAN      1. Incarcerated left inguinal hernia with necrosis and left inguinal abscess.  Had I&D of abscess, partial sigmoid colectomy with primary anastomosis on 09/17/2024.  Then had anastomotic leak and went back to surgery 09/25/2024 for laparotomy, colectomy and colostomy with of left inguinal area.  Continue antibiotics    2. Leukocytosis.  As above    3. Bilateral epididymo-orchitis worse on the left.  Resolving.  Antibiotics as above.    4. ESRD on hemodialysis Monday/ Wednesday/Friday.  Status post left upper extremity fistulogram.  He is on transplant list.     RECOMMENDATIONS:    Continue current antimicrobial  Plan to treat for about 14 day from last surgery  Anticipate switch linezolid and fluconazole to p.o.  in the next 1-2 days if patient continues to tolerate oral feeds well.    Please send Epic secure chat with any questions.  Discussed with patient and his grown father at bedside.    SUBJECTIVE      Antibiotics   Vancomycin: 09/17/2024-09/20/2024   Clindamycin:  09/07/2020 04/09/2020 2/24   Cefepime: 09/07/2024-09/22/2024   Daptomycin: 09/22/2024-  Fluconazole:  09/22/2024-  Zosyn: 09/22/2024-    Microbiology  Blood culture 09/07/2024: NGTD   Urine culture 09/19/2024: NGTD   Blood culture 09/24/2024:  NGTD   Left groin drainage culture 09/24/2024:  E coli in broth only resistant to tetracycline, cefazolin, ampicillin and ceftriaxone  Abdomen incision site culture 09/24/2024:  E coli in broth only sensitive to ceftriaxone and tetracycline    Review of Systems  Negative except as stated above in Interval History     OBJECTIVE   Temp:  [97.7 °F (36.5 °C)-99.2 °F (37.3 °C)] 98.1 °F (36.7 °C)  Pulse:  [] 90  Resp:  [16-20] 17  SpO2:  [91 %-100 %] 94 %  BP: (111-155)/(60-94) 126/94  Temp:  [97.7 °F (36.5 °C)-99.2 °F (37.3 °C)]   Temp: 98.1 °F (36.7 °C) (10/01/24 0701)  Pulse: 90 (10/01/24 0755)  Resp: 17 (10/01/24 0755)  BP: (!) 126/94 (10/01/24 0701)  SpO2: (!) 94 % (10/01/24 0755)    Intake/Output Summary (Last 24 hours) at 10/1/2024 0800  Last data filed at 10/1/2024 0652  Gross per 24 hour   Intake 1240 ml   Output 4750 ml   Net -3510 ml       Physical Exam  General:  Middle-aged man lying quietly in bed.  Looks much better today  Abdomen:  Distended.  Dry dressing midline.  Colostomy left lower abdomen with BERNABE drain.  Dressing left groin area with wound packing.  Genitals:  Firm Edematous scrotum worse on the left.  Nontender  CVS: S1 and 2 heard, no murmurs appreciated   Respiratory: Clear to auscultation   Skin: No rash appreciated   Musculoskeletal system: No joint or bony abnormalities appreciated   CNS: No gross focal deficits  Psych: Good mood, normal affect.    VAD:  Right radial arterial line, PIV  ISOLATION:  None    WOUNDS:  Abdominal surgical wounds, open left groin wound with packing    Significant Labs: All pertinent labs within the past 24 hours have been reviewed.    CBC LAST 7 DAYS  Recent Labs   Lab 09/25/24  1737 09/26/24  0501 09/26/24  0954 09/27/24  0359 09/28/24  0403 09/29/24  1110 09/30/24  0627 10/01/24  0546   WBC 22.95* 20.66*  --  20.31*  "24.45* 19.88* 15.43* 15.59*   RBC 3.13* 3.00*  --  2.66* 2.87* 2.77* 2.51* 2.85*   HGB 9.5* 8.9* 8.2* 7.9* 8.5* 8.5* 7.4* 8.3*   HCT 29.4* 27.7*  --  25.3* 27.7* 27.4* 24.3* 28.1*   MCV 94 92  --  95 97 99* 97 99*   MCH 30.4 29.7  --  29.7 29.6 30.7 29.5 29.1   MCHC 32.3 32.1  --  31.2* 30.7* 31.0* 30.5* 29.5*   RDW 22.7* 23.3*  --  23.1* 22.5* 22.7* 21.5* 21.5*   * 443  --  466* 560* 560* 482* 505*   MPV 10.3 10.5  --  10.5 10.2 10.7 10.2 9.6   GRAN 79.0* 81.0*  --  73.0 76.0* 72.0 62.0 64.0   LYMPH 8.0* 0.0*  --  6.0* 1.0* 11.0* 10.0* 10.0*   MONO 2.0* 8.0  --  7.0 5.0 7.0 11.0 15.0   NRBC 0 0  --  0 0 0 1* 1*       CHEMISTRY LAST 7 DAYS  Recent Labs   Lab 09/25/24  0236 09/25/24  1051 09/25/24  1737 09/26/24  0501 09/27/24  0359 09/28/24  0403 09/30/24  0627 10/01/24  0546     --  136 135* 137 137 134* 136   K 4.0  --  4.4 5.3* 4.7 4.6 4.0 4.7   CL 97  --  98 96 97 97 94* 96   CO2 25  --  18* 24 24 23 22* 23   ANIONGAP 15  --  20* 15 16 17* 18* 17*   BUN 46*  --  51* 39* 33* 37* 63* 44*   CREATININE 13.0*  --  13.4* 11.4* 10.3* 10.1* 15.5* 13.0*   *  --  108 110 106 112* 105 109   CALCIUM 8.9  --  8.7 8.7 9.0 9.7 8.7 9.4   PH  --  7.404  --   --   --   --   --   --    MG 2.2  --  2.2 2.0 2.2 2.7* 2.5 2.5   ALBUMIN 3.1*  --  3.0* 2.9* 2.9* 3.2* 3.0* 3.4*   PROT 7.2  --  7.4 7.3 7.3 8.3 7.8 8.6*   ALKPHOS 161*  --  152* 124 106 133 114 117   ALT 48*  --  45* 37 26 33 38 36   AST 64*  --  49* 38 36 54* 43* 34   BILITOT 0.6  --  0.9 0.7 0.6 0.5 0.4 0.4       Estimated Creatinine Clearance: 10.7 mL/min (A) (based on SCr of 13 mg/dL (H)).    INFLAMMATORY/PROCAL  LAST 7 DAYS  Recent Labs   Lab 09/24/24  1514   PROCAL 13.932*   CRP 35.70*     No results found for: "ESR"  CRP   Date Value Ref Range Status   09/24/2024 35.70 (H) <1.00 mg/dL Final     Comment:     CRP-Normal Application expected values:   <1.0        mg/dL   Normal Range  1.0 - 5.0  mg/dL   Indicates mild inflammation  5.0 - 10.0 mg/dL   " Indicates severe inflammation  >10.0        mg/dL   Represents serious processes and   frequently         indicates the presence of a bacterial   infection.      09/20/2024 >16.00 (H) <1.00 mg/dL Final     Comment:     CRP-Normal Application expected values:   <1.0        mg/dL   Normal Range  1.0 - 5.0  mg/dL   Indicates mild inflammation  5.0 - 10.0 mg/dL   Indicates severe inflammation  >10.0        mg/dL   Represents serious processes and   frequently         indicates the presence of a bacterial   infection.      07/16/2024 >16.00 (H) <1.00 mg/dL Final     Comment:     CRP-Normal Application expected values:   <1.0        mg/dL   Normal Range  1.0 - 5.0  mg/dL   Indicates mild inflammation  5.0 - 10.0 mg/dL   Indicates severe inflammation  >10.0        mg/dL   Represents serious processes and   frequently         indicates the presence of a bacterial   infection.          PRIOR  MICROBIOLOGY:    Susceptibility data from last 90 days.  Collected Specimen Info Organism Amp/Sulbactam Ampicillin Cefazolin Cefepime Ceftriaxone Ciprofloxacin Clindamycin Ertapenem Erythromycin Gentamicin Levofloxacin Meropenem Oxacillin Penicillin   09/25/24 Incision site from Abdomen Culture in progress                 09/25/24 Incision site from Abdomen Escherichia coli  I  R  R  S  S  S   S   S  S  S     09/25/24 Blood from Peripheral, Antecubital, Right No growth after 5 days.                 09/25/24 Blood from Peripheral, Forearm, Right No growth after 5 days.                 09/24/24 Incision site from Groin Escherichia coli  R  R  R  S  R  S   S   S  S  S     09/24/24 Blood No growth after 5 days.                 09/24/24 Blood No growth after 5 days.                 09/17/24 Blood from Peripheral, Antecubital, Right No growth after 5 days.                 09/17/24 Blood from Peripheral, Antecubital, Right No growth after 5 days.                 07/19/24 Catheter Tip, Tunneled Staphylococcus aureus      S   S   I     S  R    07/16/24 Blood from Peripheral, Forearm, Right Staphylococcus aureus                 07/15/24 Blood from Peripheral, Antecubital, Right Staphylococcus aureus      S   S   S     S  R   07/15/24 Blood from Peripheral, Hand, Right Staphylococcus aureus                   Collected Specimen Info Organism PIPERACILLIN/TAZO SUSCEPTIBILITY Tetracycline Tobramycin Trimeth/Sulfa Vancomycin   09/25/24 Incision site from Abdomen Culture in progress        09/25/24 Incision site from Abdomen Escherichia coli  S  S  S  S    09/25/24 Blood from Peripheral, Antecubital, Right No growth after 5 days.        09/25/24 Blood from Peripheral, Forearm, Right No growth after 5 days.        09/24/24 Incision site from Groin Escherichia coli  S  R  S  S    09/24/24 Blood No growth after 5 days.        09/24/24 Blood No growth after 5 days.        09/17/24 Blood from Peripheral, Antecubital, Right No growth after 5 days.        09/17/24 Blood from Peripheral, Antecubital, Right No growth after 5 days.        07/19/24 Catheter Tip, Tunneled Staphylococcus aureus   S   S  S   07/16/24 Blood from Peripheral, Forearm, Right Staphylococcus aureus        07/15/24 Blood from Peripheral, Antecubital, Right Staphylococcus aureus   S   S  S   07/15/24 Blood from Peripheral, Hand, Right Staphylococcus aureus            LAST 7 DAYS MICROBIOLOGY   Microbiology Results (last 7 days)       Procedure Component Value Units Date/Time    Blood culture [[id number]] Collected: 09/25/24 1111    Order Status: Completed Specimen: Blood from Peripheral, Antecubital, Right Updated: 09/30/24 1432     Blood Culture, Routine No growth after 5 days.    Narrative:      [id number]    Blood culture [[id number]] Collected: 09/25/24 1111    Order Status: Completed Specimen: Blood from Peripheral, Forearm, Right Updated: 09/30/24 1432     Blood Culture, Routine No growth after 5 days.    Narrative:      [id number]    Blood culture [[id number]] Collected: 09/24/24 1513    Order Status:  Completed Specimen: Blood Updated: 09/29/24 1632     Blood Culture, Routine No growth after 5 days.    Narrative:      Collection has been rescheduled by [email or web address] at 09/23/2024 14:35 Reason:   Patient unavailable dialysis   Collection has been rescheduled by MYB at 09/23/2024 18:44 Reason:   Unable to collect  Collection has been rescheduled by CZB at 09/23/2024 19:02 Reason:   Unable to collect  Collection has been rescheduled by RE1 at 09/24/2024 09:43 Reason:   Spoke to the patient's nurse about unable to collect she is   contacting Lambert and the doctor  Collection has been rescheduled by [email or web address] at 09/23/2024 14:35 Reason:   Patient unavailable dialysis   Collection has been rescheduled by MYB at 09/23/2024 18:44 Reason:   Unable to collect  Collection has been rescheduled by CZB at 09/23/2024 19:02 Reason:   Unable to collect  Collection has been rescheduled by RE1 at 09/24/2024 09:43 Reason:   Spoke to the patient's nurse about unable to collect she is   contacting Lambert and the doctor    Blood culture [[id number]] Collected: 09/24/24 1122    Order Status: Completed Specimen: Blood Updated: 09/29/24 1232     Blood Culture, Routine No growth after 5 days.    Narrative:      Collection has been rescheduled by [email or web address] at 09/23/2024 14:35 Reason:   Patient unavailable dialysis   Collection has been rescheduled by MYB at 09/23/2024 18:44 Reason:   Unable to collect  Collection has been rescheduled by CZB at 09/23/2024 19:02 Reason:   Unable to collect  Collection has been rescheduled by RE1 at 09/24/2024 09:43 Reason:   Spoke to the patient's nurse about unable to collect she is   contacting Lambert and the doctor  Collection has been rescheduled by [email or web address] at 09/23/2024 14:35 Reason:   Patient unavailable dialysis   Collection has been rescheduled by MYB at 09/23/2024 18:44 Reason:   Unable to collect  Collection has been rescheduled by CZB at 09/23/2024 19:02 Reason:   Unable to collect  Collection has been rescheduled by RE1 at  09/24/2024 09:43 Reason:   Spoke to the patient's nurse about unable to collect she is   contacting Lambert and the doctor    AFB Culture & Smear [[id number]] Collected: 09/25/24 1413    Order Status: Completed Specimen: Incision site from Abdomen Updated: 09/28/24 1630     AFB CULTURE STAIN No acid fast bacilli seen.     AFB CULTURE STAIN Testing performed by:     AFB CULTURE STAIN Lab [facility]     AFB CULTURE STAIN [address] [facility]     [place] [address]     AFB CULTURE STAIN Dr. Osbaldo Sherwood MD    Culture, Anaerobe [[id number]] Collected: 09/25/24 1413    Order Status: Completed Specimen: Incision site from Abdomen Updated: 09/28/24 1405     Anaerobic Culture Culture in progress    IV catheter culture [[id number]] Collected: 09/25/24 1132    Order Status: Completed Specimen: Catheter Tip, Dialysis Updated: 09/28/24 1113     Aerobic Culture - Cath tip No growth    Narrative:      Trialysis    Aerobic culture [[id number]]  (Abnormal)  (Susceptibility) Collected: 09/24/24 1535    Order Status: Completed Specimen: Incision site from Groin Updated: 09/28/24 0738     Aerobic Bacterial Culture ESCHERICHIA COLI  From broth only      Narrative:      Left groin incision site drainage swab    Aerobic culture [[id number]]  (Abnormal)  (Susceptibility) Collected: 09/25/24 1413    Order Status: Completed Specimen: Incision site from Abdomen Updated: 09/28/24 0737     Aerobic Bacterial Culture ESCHERICHIA COLI  From broth only      Gram stain [[id number]] Collected: 09/25/24 1413    Order Status: Completed Specimen: Incision site from Abdomen Updated: 09/27/24 1530     Gram Stain Result Moderate WBC's      No organisms seen    Fungus culture [[id number]] Collected: 09/25/24 1413    Order Status: Sent Specimen: Incision site from Abdomen Updated: 09/25/24 1652    Gram stain [[id number]] Collected: 09/24/24 1535    Order Status: Completed Specimen: Incision site from Groin Updated: 09/24/24 1829      Gram Stain Result Few WBC's      No organisms seen    Narrative:      Left groin incision site drainage            CURRENT/PREVIOUS VISIT EKG  Results for orders placed or performed during the hospital encounter of 09/17/24   EKG 12-lead    Collection Time: 09/17/24  8:57 AM   Result Value Ref Range    QRS Duration 92 ms    [facility] QTC Calculation 469 ms    Narrative    Test Reason : N18.6,    Vent. Rate : 119 BPM     Atrial Rate : 119 BPM     P-R Int : 134 ms          QRS Dur : 092 ms      QT Int : 334 ms       P-R-T Axes : 027 -78 004 degrees     QTc Int : 469 ms    Sinus tachycardia  Left axis deviation  Cannot rule out Anterior infarct ,age undetermined  Abnormal ECG  When compared with ECG of 15-JUL-2024 17:47,  T wave inversion now evident in Inferior leads  T wave inversion no longer evident in Lateral leads  Confirmed by Jose Cruz MD ([id number]) on 9/22/2024 1:02:21 PM    Referred By: [email or web address]   SELF           Confirmed By:Jose Cruz MD     Significant Imaging: I have reviewed all relevant and available imaging results/findings within the past 24 hours.    I spent a total of 45 minutes on the day of the visit.This includes face to face time and non-face to face time preparing to see the patient (eg, review of tests), obtaining and/or reviewing separately obtained history, documenting clinical information in the electronic or other health record, independently interpreting results and communicating results to the patient/family/caregiver, or care coordinator.    Kerwin Au MD  Date of Service: 10/01/2024      This note was created using [employer] voice recognition software that occasionally misinterpreted phrases or words.

## 2024-10-01 NOTE — ASSESSMENT & PLAN NOTE
Anemia is likely due to acute blood loss which was from surgery and chronic disease due to ESRD. Most recent hemoglobin and hematocrit are listed below.  Recent Labs     09/29/24  1110 09/30/24  0627 10/01/24  0546   HGB 8.5* 7.4* 8.3*   HCT 27.4* 24.3* 28.1*     Plan  - Monitor serial CBC: Daily  - Transfuse PRBC if patient becomes hemodynamically unstable, symptomatic or H/H drops below 7/21.  - Patient has received 3 units of PRBCs  - Patient's anemia is currently stable

## 2024-10-01 NOTE — SUBJECTIVE & OBJECTIVE
Interval History:  Lying in bed, awake and alert.  Just finished getting bath. Had dialysis yesterday, 4 L fluid removed.  Getting regular diet. Complains of pain being uncontrolled.  Hasdsmall amount of bloody mucus that he spits out of his mouth, he thinks it was postnasal drainage.  He save it in a cup at bedside. Mucous examined, very thick and dark red. Reports not sleeping well and requests something for insomnia.     Review of Systems   Constitutional:  Positive for activity change, appetite change and fatigue.   Gastrointestinal:  Positive for abdominal pain (Surgical) and nausea (Improving).        Colostomy   Genitourinary:         Dialysis.  Essentially anuric.   Musculoskeletal:  Positive for arthralgias, gait problem and myalgias.   Skin:  Positive for wound (Surgical).   Neurological:  Positive for weakness (Generalized).   Psychiatric/Behavioral:  Positive for sleep disturbance.    All other systems reviewed and are negative.    Objective:     Vital Signs (Most Recent):  Temp: 98.1 °F (36.7 °C) (10/01/24 0330)  Pulse: 91 (10/01/24 0330)  Resp: 20 (10/01/24 0330)  BP: 123/73 (10/01/24 0330)  SpO2: 97 % (10/01/24 0330) Vital Signs (24h Range):  Temp:  [97.7 °F (36.5 °C)-99.2 °F (37.3 °C)] 98.1 °F (36.7 °C)  Pulse:  [] 91  Resp:  [16-20] 20  SpO2:  [93 %-100 %] 97 %  BP: (111-155)/(60-78) 123/73     Weight: 134 kg (295 lb 6.7 oz)  Body mass index is 37.93 kg/m².    Intake/Output Summary (Last 24 hours) at 10/1/2024 0708  Last data filed at 10/1/2024 0652  Gross per 24 hour   Intake 1240 ml   Output 4750 ml   Net -3510 ml         Physical Exam  Constitutional:       General: He is not in acute distress.     Appearance: He is obese.   Eyes:      Extraocular Movements: Extraocular movements intact.      Conjunctiva/sclera: Conjunctivae normal.      Pupils: Pupils are equal, round, and reactive to light.   Cardiovascular:      Rate and Rhythm: Normal rate and regular rhythm.      Heart sounds: Normal  heart sounds.      Arteriovenous access: Left arteriovenous access is present.  Pulmonary:      Effort: Pulmonary effort is normal. No respiratory distress.      Breath sounds: Normal breath sounds.   Abdominal:      General: There is distension (Mild).      Tenderness: There is generalized abdominal tenderness (postop pain).      Comments: Colostomy intact to LLQ, stoma pink and moist   Musculoskeletal:         General: No swelling. Normal range of motion.      Cervical back: Normal range of motion and neck supple.   Skin:     General: Skin is warm and dry.      Capillary Refill: Capillary refill takes less than 2 seconds.      Comments: Midline abdominal surgical dressing CDI.  BERNABE drain intact with serosanguineous drainage.  Left groin dressing CDI with a Penrose.   Neurological:      General: No focal deficit present.      Mental Status: He is alert and oriented to person, place, and time.      Motor: Weakness (Generalized) present.             Significant Labs: All pertinent labs within the past 24 hours have been reviewed.  CBC:   Recent Labs   Lab 09/29/24  1110 09/30/24  0627 10/01/24  0546   WBC 19.88* 15.43* 15.59*   HGB 8.5* 7.4* 8.3*   HCT 27.4* 24.3* 28.1*   * 482* 505*     CMP:   Recent Labs   Lab 09/30/24  0627 10/01/24  0546   * 136   K 4.0 4.7   CL 94* 96   CO2 22* 23    109   BUN 63* 44*   CREATININE 15.5* 13.0*   CALCIUM 8.7 9.4   PROT 7.8 8.6*   ALBUMIN 3.0* 3.4*   BILITOT 0.4 0.4   ALKPHOS 114 117   AST 43* 34   ALT 38 36   ANIONGAP 18* 17*     Magnesium:   Recent Labs   Lab 09/30/24  0627 10/01/24  0546   MG 2.5 2.5     Microbiology Results (last 7 days)       Procedure Component Value Units Date/Time    Blood culture [[id number]] Collected: 09/25/24 1111    Order Status: Completed Specimen: Blood from Peripheral, Antecubital, Right Updated: 09/30/24 1432     Blood Culture, Routine No growth after 5 days.    Narrative:      [id number]    Blood culture [[id number]] Collected:  09/25/24 1111    Order Status: Completed Specimen: Blood from Peripheral, Forearm, Right Updated: 09/30/24 1432     Blood Culture, Routine No growth after 5 days.    Narrative:      [id number]    Blood culture [[id number]] Collected: 09/24/24 1513    Order Status: Completed Specimen: Blood Updated: 09/29/24 1632     Blood Culture, Routine No growth after 5 days.    Narrative:      Collection has been rescheduled by [email or web address] at 09/23/2024 14:35 Reason:   Patient unavailable dialysis   Collection has been rescheduled by MYB at 09/23/2024 18:44 Reason:   Unable to collect  Collection has been rescheduled by CZB at 09/23/2024 19:02 Reason:   Unable to collect  Collection has been rescheduled by RE1 at 09/24/2024 09:43 Reason:   Spoke to the patient's nurse about unable to collect she is   contacting [facility] and the doctor  Collection has been rescheduled by [email or web address] at 09/23/2024 14:35 Reason:   Patient unavailable dialysis   Collection has been rescheduled by MYB at 09/23/2024 18:44 Reason:   Unable to collect  Collection has been rescheduled by CZB at 09/23/2024 19:02 Reason:   Unable to collect  Collection has been rescheduled by RE1 at 09/24/2024 09:43 Reason:   Spoke to the patient's nurse about unable to collect she is   contacting [facility] and the doctor    Blood culture [[id number]] Collected: 09/24/24 1122    Order Status: Completed Specimen: Blood Updated: 09/29/24 1232     Blood Culture, Routine No growth after 5 days.    Narrative:      Collection has been rescheduled by [email or web address] at 09/23/2024 14:35 Reason:   Patient unavailable dialysis   Collection has been rescheduled by MYB at 09/23/2024 18:44 Reason:   Unable to collect  Collection has been rescheduled by CZB at 09/23/2024 19:02 Reason:   Unable to collect  Collection has been rescheduled by RE1 at 09/24/2024 09:43 Reason:   Spoke to the patient's nurse about unable to collect she is   contacting Lambert and the doctor  Collection has been rescheduled by [email or web address] at 09/23/2024 14:35  Reason:   Patient unavailable dialysis   Collection has been rescheduled by MYB at 09/23/2024 18:44 Reason:   Unable to collect  Collection has been rescheduled by CZB at 09/23/2024 19:02 Reason:   Unable to collect  Collection has been rescheduled by RE1 at 09/24/2024 09:43 Reason:   Spoke to the patient's nurse about unable to collect she is   contacting [facility] and the doctor    AFB Culture & Smear [[id number]] Collected: 09/25/24 1413    Order Status: Completed Specimen: Incision site from Abdomen Updated: 09/28/24 1630     AFB CULTURE STAIN No acid fast bacilli seen.     AFB CULTURE STAIN Testing performed by:     AFB CULTURE STAIN Lab [facility]     [facility] [address] [facility]     [place] [address]     AFB CULTURE STAIN Dr. Osbaldo Sherwood MD    Culture, Anaerobe [[id number]] Collected: 09/25/24 1413    Order Status: Completed Specimen: Incision site from Abdomen Updated: 09/28/24 1405     Anaerobic Culture Culture in progress    IV catheter culture [[id number]] Collected: 09/25/24 1132    Order Status: Completed Specimen: Catheter Tip, Dialysis Updated: 09/28/24 1113     Aerobic Culture - Cath tip No growth    Narrative:      Trialysis    Aerobic culture [[id number]]  (Abnormal)  (Susceptibility) Collected: 09/24/24 1535    Order Status: Completed Specimen: Incision site from Groin Updated: 09/28/24 0738     Aerobic Bacterial Culture ESCHERICHIA COLI  From broth only      Narrative:      Left groin incision site drainage swab    Aerobic culture [[id number]]  (Abnormal)  (Susceptibility) Collected: 09/25/24 1413    Order Status: Completed Specimen: Incision site from Abdomen Updated: 09/28/24 0737     Aerobic Bacterial Culture ESCHERICHIA COLI  From broth only      Gram stain [[id number]] Collected: 09/25/24 1413    Order Status: Completed Specimen: Incision site from Abdomen Updated: 09/27/24 1530     Gram Stain Result Moderate WBC's      No organisms seen    Fungus  culture [[id number]] Collected: 09/25/24 1413    Order Status: Sent Specimen: Incision site from Abdomen Updated: 09/25/24 1652    Gram stain [[id number]] Collected: 09/24/24 1535    Order Status: Completed Specimen: Incision site from Groin Updated: 09/24/24 1829     Gram Stain Result Few WBC's      No organisms seen    Narrative:      Left groin incision site drainage          Cardiac catheterization  Procedure performed in the Invasive Lab    - See Procedure Log link below for nursing documentation    - See OpNote on Surgeries Tab for physician findings    - See Imaging Tab for radiologist dictation        Significant Imaging: I have reviewed all pertinent imaging results/findings within the past 24 hours.

## 2024-10-01 NOTE — ASSESSMENT & PLAN NOTE
Chronic, controlled. Latest blood pressure and vitals reviewed-     Temp:  [97.7 °F (36.5 °C)-99.2 °F (37.3 °C)]   Pulse:  []   Resp:  [16-20]   BP: (111-148)/(60-94)   SpO2:  [91 %-100 %] .   Home meds for hypertension were reviewed and noted below.   Hypertension Medications               cloNIDine (CATAPRES) 0.3 MG tablet TAKE 1 TABLET BY MOUTH THREE TIMES DAILY    hydrALAZINE (APRESOLINE) 100 MG tablet Take 1 tablet (100 mg total) by mouth 3 (three) times daily.    isosorbide mononitrate (IMDUR) 120 MG 24 hr tablet Take 120 mg by mouth once daily.    metoprolol succinate (TOPROL-XL) 50 MG 24 hr tablet Take 150 mg by mouth once daily.    olmesartan (BENICAR) 40 MG tablet Take 1 tablet by mouth once daily        While in the hospital, will manage blood pressure as follows; Continue current antihypertensive regimen: hydralazine, losartan, clonidine, and metoprolol.  Patient was apparently getting nitro paste q.6 hours.  This was discontinued yesterday and his home isosorbide was resumed.    Will utilize p.r.n. blood pressure medication only if patient's blood pressure greater than 180/110 and he develops symptoms such as worsening chest pain or shortness of breath.

## 2024-10-01 NOTE — PROGRESS NOTES
INPATIENT NEPHROLOGY Progress Note  [facility] NEPHROLOGY    João Ham  10/01/2024    Reason for consultation:  ESRD    Chief Complaint:   Chief Complaint   Patient presents with    Fatigue    Groin Swelling     X 2 days.  Hx of Kidney failure and CHF     History of Present Illness:  Per H and P    Patient is a 44 year old male with history of ESDR on dialysis MWF, awaiting kidney transplant, HTN, presented to ED with 3 days history of left groin pain and swelling. States swelling comes every time he cough and is being painful. Patient has low grade fever 99s at home. He has been vomiting bilious fluid last 2-3 days. No diarrhea or abdominal pain.      In the ED CT abdomen showed Left inguinal hernia containing fat and air as well as marked inflammation extending from inside the pelvis, in the hernia and down into the left scrotum. This is consistent with an infected inguinal hernia, possible gangrene. WBC was 14, patient was tachycardic. General surgery was consulted and patient was admitted under hospitalist.     9/19  avf nonfunctional.   No sob or chest pain.  Has post op pain.  AFVSS  9/20  afvss.  Pt doesn't want to see previous vascular surgeon who put his avf in.  No alternative available.  Transfer center called.  Has temporary trialysis catheter.  Patient seen on hemodialysis for uremic clearance and ultrafiltration.    9/21  2.5L off w/HD yesterday.  Tmax 101.8, pressures ok.  Lab results reviewed, Hgb low but better than yesterday.  C/o post op pain, no other complaints.  9/22 POD 5.  VSS, lab results reviewed.  Hgb 7.6, added epo to HD orders.  C/o gas pains, plans to move around more today.  Next HD tomorrow.  9/23 VSS. HD today. Transfuse with next HD as needed if Hgb stil low.  9/24 VSS. Appreciate input from Urology, Gen Surgery, Vascular Surgery, ID on this complex patient. Plan for fistulogram tomorrow. CXR yesterday shows trialysis line tip in appropriate cocation. Will attempt HD today since  we skipped yesterday due to nurse and patient concerns that the line may be malpositioned.  9/25 VSS. s/p cephalic vein dilation by Dr. Robledo yesterday, tolerated HD very well. Hold HD today. Hypotension this AM, low grade fever yesterday, WBC elevated, received IVF bolus and Midodrine. Continues to be infected per Surgery, we can remove trialysis line now that AVF is treated and usable... Consider ICU. Stopped hydralazine and olmesartan, decreased Clonidine to 0.1 BID, not sure what to do with Metoprolol 150mg and Hydralazine 120mg... He may be sob/hypoxic due to anemia, suggest 1 PRBC instead of IVF, since Hgb is 7.    Addendum @ 10:47 AM  Seen at bedside in ICU with Dr. Rodriguez and Dr. Au. Mother present as well. Reviewed imaging. Agree with plan for urgent ex lap, remove central line and place mid line, keep current abx, hold on transfusion and dialysis and reassess later. ABG and fresh set of cx now, re-evalaute later.    Addendum @ 4:36 PM  Discussed briefly with Dr. Connors, dialysis nurse Brant and with ICU nurse. Patient had colostomy placed due to anastomotic leak and had 2 PRBC given. Upon return to floor was hypertensive with SBP > 200 and NPO. Advised Cleviprex drip for now (earlier to day he was on max dose Clonidine, mad dose Hydralazine, max dose Olmesartan, 150mg Metoprolol-XL and 120mg of Imdur - none of which he can get due to NPO) and will do urgent HD with UF 2L or so as tolerated.  9/26  POD 1 s/p . Exploratory laparotomy,. Colon resection end-colostomy, Mobilization of splenic flexure, and left inguinal canal exploration.  Post pain. No sob.  Sleepy.    9/27  AFVSS.  Some post op pain.  No sob.  No angina.   Patient seen on hemodialysis for uremic clearance and ultrafiltration.    9/28 s/p 3.8 liter uf yesterday.     AFVSS.  Post op pain.  Dry cough.  No sob.  Projectile vomiting earlier today per patient  9/30 VSS. HD today.  10/1 GS note reviewed, working on pain control, tolerating  diet    Plan of Care:    ESRD on HD MWF  --continue dialysis per routine    Anemia of CKD  --continue LOR with HD  --transfused this admission    Secondary HPT  --renal diet  --continue binders with meals    Hypertension  --continue current BP meds  --uf with hd as needed    Hyperkalemia  --2k dialysate  --low k diet    AVF malfunction fixed  --appreciate Vascular eval, s/p fistulogram and cephalic vein stenosis dilation on 9/23 by Dr. Salvador.    Inguinal hernia of left side with gangrene - W/ Colonic perforation with abscess   --S/P Robotic sigmoid resection, Mobilization of splenic flexure, left inguinal canal exploration, and I&D 9/17 per Dr. Connors   --dose antbx for CrCl < 10/HD    Thank you for allowing us to participate in this patient's care. We will continue to follow.    Vital Signs:  Temp Readings from Last 3 Encounters:   10/01/24 98.1 °F (36.7 °C) (Oral)   08/22/24 97.3 °F (36.3 °C) (Temporal)   07/30/24 98.6 °F (37 °C) (Oral)       Pulse Readings from Last 3 Encounters:   10/01/24 90   08/29/24 (!) 115   08/22/24 110       BP Readings from Last 3 Encounters:   10/01/24 (!) 126/94   08/29/24 99/68   08/22/24 (!) 137/91       Weight:  Wt Readings from Last 3 Encounters:   09/18/24 134 kg (295 lb 6.7 oz)   08/29/24 131.3 kg (289 lb 7.4 oz)   08/22/24 131 kg (288 lb 12.8 oz)       Medications:  No current facility-administered medications on file prior to encounter.     Current Outpatient Medications on File Prior to Encounter   Medication Sig Dispense Refill    apixaban (ELIQUIS) 2.5 mg Tab Take 2.5 mg by mouth 2 (two) times daily.      calcitRIOL (ROCALTROL) 0.25 MCG Cap Take 1 capsule by mouth once daily (Patient taking differently: Take 0.25 mcg by mouth once daily.) 90 capsule 1    cholecalciferol, vitamin D3, 125 mcg (5,000 unit) Tab Take 1 tablet by mouth once daily.      cloNIDine (CATAPRES) 0.3 MG tablet TAKE 1 TABLET BY MOUTH THREE TIMES DAILY 270 tablet 2    isosorbide mononitrate (IMDUR) 120  MG 24 hr tablet Take 120 mg by mouth once daily.      metoprolol succinate (TOPROL-XL) 50 MG 24 hr tablet Take 150 mg by mouth once daily.      olmesartan (BENICAR) 40 MG tablet Take 1 tablet by mouth once daily 90 tablet 0    sevelamer carbonate (RENVELA) 800 mg Tab Take 2 tablets (1,600 mg total) by mouth 3 (three) times daily with meals. 180 tablet 11    calcium carbonate (TUMS) 200 mg calcium (500 mg) chewable tablet Take 2 tablets (1,000 mg total) by mouth 3 (three) times daily with meals. (Patient taking differently: Take 1,000 mg by mouth 3 (three) times daily.) 180 tablet 11    hydrALAZINE (APRESOLINE) 100 MG tablet Take 1 tablet (100 mg total) by mouth 3 (three) times daily. 90 tablet 11     Scheduled Meds:   cloNIDine  0.3 mg Oral TID    epoetin mily-epbx  10,000 Units Intravenous Every Mon, Wed, Fri    fluconazole (DIFLUCAN) IV (PEDS and ADULTS)  200 mg Intravenous Q24H    gabapentin  200 mg Oral BID    heparin (porcine)  7,500 Units Subcutaneous Q8H    hydrALAZINE  100 mg Oral Q8H    HYDROmorphone  1 mg Intravenous Once    isosorbide mononitrate  30 mg Oral Daily    LIDOcaine-prilocaine   Topical (Top) Once    linezolid  600 mg Intravenous Q12H    losartan  100 mg Oral Daily    metoprolol succinate  150 mg Oral Daily    piperacillin-tazobactam (Zosyn) IV (PEDS and ADULTS) (extended infusion is not appropriate)  4.5 g Intravenous Q12H     Continuous Infusions:        PRN Meds:.  Current Facility-Administered Medications:     0.9% NaCl, , Intravenous, PRN    acetaminophen, 650 mg, Oral, Q4H PRN    aluminum-magnesium hydroxide-simethicone, 30 mL, Oral, QID PRN    dextrose 10%, 12.5 g, Intravenous, PRN    dextrose 10%, 12.5 g, Intravenous, PRN    dextrose 10%, 12.5 g, Intravenous, PRN    dextrose 10%, 25 g, Intravenous, PRN    dextrose 10%, 25 g, Intravenous, PRN    dextrose 10%, 25 g, Intravenous, PRN    glucagon (human recombinant), 1 mg, Intramuscular, PRN    glucose, 16 g, Oral, PRN    glucose, 24 g,  "Oral, PRN    heparin (porcine), 4,000 Units, Intravenous, PRN    HYDROmorphone, 2 mg, Intravenous, Q3H PRN    insulin aspart U-100, 0-5 Units, Subcutaneous, QID (AC + HS) PRN    iohexoL, 100 mL, Intravenous, ONCE PRN    LIDOcaine-prilocaine, , Topical (Top), PRN    magnesium oxide, 800 mg, Oral, PRN    magnesium oxide, 800 mg, Oral, PRN    melatonin, 6 mg, Oral, Nightly PRN    naloxone, 0.02 mg, Intravenous, PRN    ondansetron, 4 mg, Intravenous, Q6H PRN    oxyCODONE-acetaminophen, 1 tablet, Oral, Q4H PRN    potassium bicarbonate, 35 mEq, Oral, PRN    potassium bicarbonate, 50 mEq, Oral, PRN    potassium bicarbonate, 60 mEq, Oral, PRN    potassium, sodium phosphates, 2 packet, Oral, PRN    potassium, sodium phosphates, 2 packet, Oral, PRN    potassium, sodium phosphates, 2 packet, Oral, PRN    promethazine (PHENERGAN) 12.5 mg in 0.9% NaCl 50 mL IVPB, 12.5 mg, Intravenous, Q6H PRN    sodium chloride 0.9%, 250 mL, Intravenous, PRN    sodium chloride 0.9%, 3 mL, Intravenous, Q12H PRN    Review of Systems:  Neg    Physical Exam:    BP (!) 126/94   Pulse 90   Temp 98.1 °F (36.7 °C) (Oral)   Resp 17   Ht 6' 2" (1.88 m)   Wt 134 kg (295 lb 6.7 oz)   SpO2 (!) 94%   BMI 37.93 kg/m²     General Appearance:    Alert, cooperative, no distress, appears stated age   Head:    Normocephalic, without obvious abnormality, atraumatic   Eyes:    PER, conjunctiva/corneas clear, EOM's intact in both eyes        Throat:   Lips, mucosa, and tongue normal; teeth and gums normal   Back:     Symmetric, no curvature, ROM normal, no CVA tenderness   Lungs:     Clear to auscultation bilaterally, respirations unlabored   Chest wall:    No tenderness or deformity   Heart:    Regular rate and rhythm, S1 and S2 normal, no murmur, rub   or gallop   Abdomen:     Soft, non-tender, bowel sounds active all four quadrants,     no masses, no organomegaly   Extremities:   Extremities normal, atraumatic, no cyanosis or edema   Pulses:   2+ and " symmetric all extremities   MSK:   No joint or muscle swelling, tenderness or deformity   Skin:   Skin color, texture, turgor normal, no rashes or lesions   Neurologic:   CNII-XII intact, normal strength and sensation       Throughout.  No flap     Results:  Recent Labs   Lab 09/28/24 0403 09/30/24  0627 10/01/24  0546    134* 136   K 4.6 4.0 4.7   CL 97 94* 96   CO2 23 22* 23   BUN 37* 63* 44*   CREATININE 10.1* 15.5* 13.0*   * 105 109       Recent Labs   Lab 09/28/24 0403 09/30/24  0627 10/01/24  0546   CALCIUM 9.7 8.7 9.4   ALBUMIN 3.2* 3.0* 3.4*   MG 2.7* 2.5 2.5       Recent Labs   Lab 02/02/23  0745 05/19/23  1022 06/19/23  1031   PTH, Intact 225.6 H 335.8 H 319.0 H       Recent Labs   Lab 09/28/24  0707 09/28/24  1136 09/28/24  1655 09/28/24  1913 09/29/24  0750 09/29/24  1104 09/29/24  1550 09/29/24  2031 09/30/24  0748 10/01/24  0741   POCTGLUCOSE 127* 160* 122* 124* 120* 151* 102 142* 111* 124*       Recent Labs   Lab 10/10/22  2005 06/22/23  0446 07/16/24  0510   Hemoglobin A1C 5.5 5.5 6.1       Recent Labs   Lab 09/29/24  1110 09/30/24  0627 10/01/24  0546   WBC 19.88* 15.43* 15.59*   HGB 8.5* 7.4* 8.3*   HCT 27.4* 24.3* 28.1*   * 482* 505*   MCV 99* 97 99*   MCHC 31.0* 30.5* 29.5*   MONO 7.0 11.0 15.0   EOSINOPHIL 1.0 2.0 0.0       Recent Labs   Lab 09/28/24  0403 09/30/24  0627 10/01/24  0546   BILITOT 0.5 0.4 0.4   PROT 8.3 7.8 8.6*   ALBUMIN 3.2* 3.0* 3.4*   ALKPHOS 133 114 117   ALT 33 38 36   AST 54* 43* 34       Recent Labs   Lab 10/10/22  1645 01/09/23  1123 06/19/23  1622 07/17/24  0505 09/19/24  1435   Color, UA Yellow   < > Straw Yellow Yellow   Appearance, UA Clear   < > Clear Hazy A Clear   pH, UA 6.0   < > 6.0 6.0 8.0   Specific Gravity, UA 1.025   < > 1.010 1.020 1.010   Protein, UA 2+ A   < > 2+ A 3+ A 2+ A   Glucose, UA Negative   < > Negative Negative Negative   Ketones, UA Negative   < > Negative Negative Negative   Urobilinogen, UA Negative  --   --  Negative  Negative   Bilirubin (UA) Negative   < > Negative Negative Negative   Occult Blood UA 1+ A   < > Negative 2+ A 2+ A   Nitrite, UA Negative   < > Negative Negative Negative   RBC, UA 1   < > 0 6 H 4   WBC, UA 0   < > 2 52 H 21 H   Bacteria None   < > None None None   Hyaline Casts, UA 0   < > 0 0 0    < > = values in this interval not displayed.       Recent Labs   Lab 07/15/24  1824 09/17/24  0928 09/25/24  1051   POC PH  --   --  7.404   POC PCO2  --   --  37.7   POC HCO3  --   --  23.6 L   POC PO2  --   --  87   POC SATURATED O2  --   --  97   POC BE  --   --  -1   Sample VENOUS VENOUS ARTERIAL       Microbiology Results (last 7 days)       Procedure Component Value Units Date/Time    Blood culture [[id number]] Collected: 09/25/24 1111    Order Status: Completed Specimen: Blood from Peripheral, Antecubital, Right Updated: 09/30/24 1432     Blood Culture, Routine No growth after 5 days.    Narrative:      [id number]    Blood culture [[id number]] Collected: 09/25/24 1111    Order Status: Completed Specimen: Blood from Peripheral, Forearm, Right Updated: 09/30/24 1432     Blood Culture, Routine No growth after 5 days.    Narrative:      [id number]    Blood culture [[id number]] Collected: 09/24/24 1513    Order Status: Completed Specimen: Blood Updated: 09/29/24 1632     Blood Culture, Routine No growth after 5 days.    Narrative:      Collection has been rescheduled by [email or web address] at 09/23/2024 14:35 Reason:   Patient unavailable dialysis   Collection has been rescheduled by MYB at 09/23/2024 18:44 Reason:   Unable to collect  Collection has been rescheduled by CZB at 09/23/2024 19:02 Reason:   Unable to collect  Collection has been rescheduled by RE1 at 09/24/2024 09:43 Reason:   Spoke to the patient's nurse about unable to collect she is   contacting Lambert and the doctor  Collection has been rescheduled by [email or web address] at 09/23/2024 14:35 Reason:   Patient unavailable dialysis   Collection has been rescheduled by MYB at 09/23/2024 18:44  Reason:   Unable to collect  Collection has been rescheduled by CZB at 09/23/2024 19:02 Reason:   Unable to collect  Collection has been rescheduled by RE1 at 09/24/2024 09:43 Reason:   Spoke to the patient's nurse about unable to collect she is   contacting Lambert and the doctor    Blood culture [[id number]] Collected: 09/24/24 1122    Order Status: Completed Specimen: Blood Updated: 09/29/24 1232     Blood Culture, Routine No growth after 5 days.    Narrative:      Collection has been rescheduled by [email or web address] at 09/23/2024 14:35 Reason:   Patient unavailable dialysis   Collection has been rescheduled by MYB at 09/23/2024 18:44 Reason:   Unable to collect  Collection has been rescheduled by CZB at 09/23/2024 19:02 Reason:   Unable to collect  Collection has been rescheduled by RE1 at 09/24/2024 09:43 Reason:   Spoke to the patient's nurse about unable to collect she is   contacting Lambert and the doctor  Collection has been rescheduled by [email or web address] at 09/23/2024 14:35 Reason:   Patient unavailable dialysis   Collection has been rescheduled by MYB at 09/23/2024 18:44 Reason:   Unable to collect  Collection has been rescheduled by CZB at 09/23/2024 19:02 Reason:   Unable to collect  Collection has been rescheduled by RE1 at 09/24/2024 09:43 Reason:   Spoke to the patient's nurse about unable to collect she is   contacting Lambert and the doctor    AFB Culture & Smear [[id number]] Collected: 09/25/24 1413    Order Status: Completed Specimen: Incision site from Abdomen Updated: 09/28/24 1630     AFB CULTURE STAIN No acid fast bacilli seen.     AFB CULTURE STAIN Testing performed by:     AFB CULTURE STAIN Lab [facility] [place]     [facility] [address] [facility] [facility]     [place] [address]     AFB CULTURE STAIN Dr. Osbaldo Sherwood MD    Culture, Anaerobe [[id number]] Collected: 09/25/24 1413    Order Status: Completed Specimen: Incision site from Abdomen Updated: 09/28/24 1405     Anaerobic Culture Culture in  progress    IV catheter culture [[id number]] Collected: 09/25/24 1132    Order Status: Completed Specimen: Catheter Tip, Dialysis Updated: 09/28/24 1113     Aerobic Culture - Cath tip No growth    Narrative:      Trialysis    Aerobic culture [[id number]]  (Abnormal)  (Susceptibility) Collected: 09/24/24 1535    Order Status: Completed Specimen: Incision site from Groin Updated: 09/28/24 0738     Aerobic Bacterial Culture ESCHERICHIA COLI  From broth only      Narrative:      Left groin incision site drainage swab    Aerobic culture [[id number]]  (Abnormal)  (Susceptibility) Collected: 09/25/24 1413    Order Status: Completed Specimen: Incision site from Abdomen Updated: 09/28/24 0737     Aerobic Bacterial Culture ESCHERICHIA COLI  From broth only      Gram stain [[id number]] Collected: 09/25/24 1413    Order Status: Completed Specimen: Incision site from Abdomen Updated: 09/27/24 1530     Gram Stain Result Moderate WBC's      No organisms seen    Fungus culture [[id number]] Collected: 09/25/24 1413    Order Status: Sent Specimen: Incision site from Abdomen Updated: 09/25/24 1652    Gram stain [[id number]] Collected: 09/24/24 1535    Order Status: Completed Specimen: Incision site from Groin Updated: 09/24/24 1829     Gram Stain Result Few WBC's      No organisms seen    Narrative:      Left groin incision site drainage          Patient care time was spent personally by me on the following activities: > 35 minutes  Obtaining a history.  Examination of patient.  Providing medical care at the patients bedside.  Developing a treatment plan with patient or surrogate and bedside caregivers.  Ordering and reviewing laboratory studies, radiographic studies, pulse oximetry.  Ordering and performing treatments and interventions.  Evaluation of patient's response to treatment.  Discussions with consultants while on the unit and immediately available to the patient.  Re-evaluation of the patient's condition.  Documentation in  the medical record.      Oni Garcia MD    [facility] [place]  [phone]

## 2024-10-01 NOTE — ASSESSMENT & PLAN NOTE
S/p open laparotomy with resection of affected colon and creation of colostomy 9/25  ID following: Continue IV Zyvox, IV Zosyn, and Diflucan  Continue mobilization as tolerated:  Out of bed to chair t.i.d. with meals  PT/OT following  Continue regular diet  Pain management

## 2024-10-02 PROBLEM — E87.5 HYPERKALEMIA: Status: RESOLVED | Noted: 2024-09-26 | Resolved: 2024-10-02

## 2024-10-02 PROBLEM — R04.0 BLEEDING FROM THE NOSE: Status: ACTIVE | Noted: 2024-10-02

## 2024-10-02 LAB
ALBUMIN SERPL BCP-MCNC: 3.3 G/DL (ref 3.5–5.2)
ALP SERPL-CCNC: 110 U/L (ref 55–135)
ALT SERPL W/O P-5'-P-CCNC: 32 U/L (ref 10–44)
ANION GAP SERPL CALC-SCNC: 20 MMOL/L (ref 8–16)
ANISOCYTOSIS BLD QL SMEAR: SLIGHT
AST SERPL-CCNC: 31 U/L (ref 10–40)
BASOPHILS # BLD AUTO: ABNORMAL K/UL (ref 0–0.2)
BASOPHILS NFR BLD: 0 % (ref 0–1.9)
BILIRUB SERPL-MCNC: 0.4 MG/DL (ref 0.1–1)
BUN SERPL-MCNC: 53 MG/DL (ref 6–20)
CALCIUM SERPL-MCNC: 9.3 MG/DL (ref 8.7–10.5)
CHLORIDE SERPL-SCNC: 94 MMOL/L (ref 95–110)
CO2 SERPL-SCNC: 19 MMOL/L (ref 23–29)
CREAT SERPL-MCNC: 15.2 MG/DL (ref 0.5–1.4)
DIFFERENTIAL METHOD BLD: ABNORMAL
EOSINOPHIL # BLD AUTO: ABNORMAL K/UL (ref 0–0.5)
EOSINOPHIL NFR BLD: 0 % (ref 0–8)
ERYTHROCYTE [DISTWIDTH] IN BLOOD BY AUTOMATED COUNT: 21.4 % (ref 11.5–14.5)
EST. GFR  (NO RACE VARIABLE): 3.7 ML/MIN/1.73 M^2
GLUCOSE SERPL-MCNC: 114 MG/DL (ref 70–110)
HCT VFR BLD AUTO: 26.9 % (ref 40–54)
HGB BLD-MCNC: 8.1 G/DL (ref 14–18)
HYPOCHROMIA BLD QL SMEAR: ABNORMAL
IMM GRANULOCYTES # BLD AUTO: ABNORMAL K/UL (ref 0–0.04)
IMM GRANULOCYTES NFR BLD AUTO: ABNORMAL % (ref 0–0.5)
LYMPHOCYTES # BLD AUTO: ABNORMAL K/UL (ref 1–4.8)
LYMPHOCYTES NFR BLD: 18 % (ref 18–48)
MAGNESIUM SERPL-MCNC: 2.4 MG/DL (ref 1.6–2.6)
MCH RBC QN AUTO: 29.8 PG (ref 27–31)
MCHC RBC AUTO-ENTMCNC: 30.1 G/DL (ref 32–36)
MCV RBC AUTO: 99 FL (ref 82–98)
METAMYELOCYTES NFR BLD MANUAL: 2 %
MONOCYTES # BLD AUTO: ABNORMAL K/UL (ref 0.3–1)
MONOCYTES NFR BLD: 17 % (ref 4–15)
MYELOCYTES NFR BLD MANUAL: 1 %
NEUTROPHILS NFR BLD: 55 % (ref 38–73)
NEUTS BAND NFR BLD MANUAL: 7 %
NRBC BLD-RTO: 0 /100 WBC
PLATELET # BLD AUTO: 497 K/UL (ref 150–450)
PLATELET BLD QL SMEAR: ABNORMAL
PMV BLD AUTO: 10 FL (ref 9.2–12.9)
POCT GLUCOSE: 126 MG/DL (ref 70–110)
POCT GLUCOSE: 141 MG/DL (ref 70–110)
POCT GLUCOSE: 146 MG/DL (ref 70–110)
POTASSIUM SERPL-SCNC: 4.3 MMOL/L (ref 3.5–5.1)
PROT SERPL-MCNC: 8.4 G/DL (ref 6–8.4)
RBC # BLD AUTO: 2.72 M/UL (ref 4.6–6.2)
SODIUM SERPL-SCNC: 133 MMOL/L (ref 136–145)
WBC # BLD AUTO: 15.46 K/UL (ref 3.9–12.7)

## 2024-10-02 PROCEDURE — 25000003 PHARM REV CODE 250: Performed by: NURSE PRACTITIONER

## 2024-10-02 PROCEDURE — 63600175 PHARM REV CODE 636 W HCPCS: Mod: JZ,JG | Performed by: SURGERY

## 2024-10-02 PROCEDURE — 83735 ASSAY OF MAGNESIUM: CPT | Performed by: SURGERY

## 2024-10-02 PROCEDURE — 85007 BL SMEAR W/DIFF WBC COUNT: CPT | Performed by: SURGERY

## 2024-10-02 PROCEDURE — 90935 HEMODIALYSIS ONE EVALUATION: CPT

## 2024-10-02 PROCEDURE — 25000003 PHARM REV CODE 250: Performed by: SURGERY

## 2024-10-02 PROCEDURE — 36415 COLL VENOUS BLD VENIPUNCTURE: CPT | Performed by: SURGERY

## 2024-10-02 PROCEDURE — 12000002 HC ACUTE/MED SURGE SEMI-PRIVATE ROOM

## 2024-10-02 PROCEDURE — 94660 CPAP INITIATION&MGMT: CPT

## 2024-10-02 PROCEDURE — 25000003 PHARM REV CODE 250: Performed by: INTERNAL MEDICINE

## 2024-10-02 PROCEDURE — 85027 COMPLETE CBC AUTOMATED: CPT | Performed by: SURGERY

## 2024-10-02 PROCEDURE — 94761 N-INVAS EAR/PLS OXIMETRY MLT: CPT

## 2024-10-02 PROCEDURE — 63700000 PHARM REV CODE 250 ALT 637 W/O HCPCS: Performed by: INTERNAL MEDICINE

## 2024-10-02 PROCEDURE — 80053 COMPREHEN METABOLIC PANEL: CPT | Performed by: SURGERY

## 2024-10-02 PROCEDURE — 25500020 PHARM REV CODE 255

## 2024-10-02 PROCEDURE — 25000003 PHARM REV CODE 250

## 2024-10-02 PROCEDURE — 63600175 PHARM REV CODE 636 W HCPCS: Performed by: NURSE PRACTITIONER

## 2024-10-02 PROCEDURE — 99900035 HC TECH TIME PER 15 MIN (STAT)

## 2024-10-02 PROCEDURE — 99232 SBSQ HOSP IP/OBS MODERATE 35: CPT | Mod: ,,, | Performed by: INTERNAL MEDICINE

## 2024-10-02 PROCEDURE — 63600175 PHARM REV CODE 636 W HCPCS: Performed by: SURGERY

## 2024-10-02 PROCEDURE — 94799 UNLISTED PULMONARY SVC/PX: CPT

## 2024-10-02 RX ORDER — FLUCONAZOLE 100 MG/1
400 TABLET ORAL ONCE
Status: COMPLETED | OUTPATIENT
Start: 2024-10-02 | End: 2024-10-02

## 2024-10-02 RX ORDER — HYDROMORPHONE HYDROCHLORIDE 2 MG/1
2 TABLET ORAL EVERY 4 HOURS PRN
Status: DISCONTINUED | OUTPATIENT
Start: 2024-10-02 | End: 2024-10-03

## 2024-10-02 RX ORDER — LINEZOLID 600 MG/1
600 TABLET, FILM COATED ORAL EVERY 12 HOURS
Status: DISCONTINUED | OUTPATIENT
Start: 2024-10-02 | End: 2024-10-06 | Stop reason: HOSPADM

## 2024-10-02 RX ORDER — FLUCONAZOLE 100 MG/1
100 TABLET ORAL DAILY
Status: DISCONTINUED | OUTPATIENT
Start: 2024-10-03 | End: 2024-10-06 | Stop reason: HOSPADM

## 2024-10-02 RX ORDER — SIMETHICONE 80 MG
1 TABLET,CHEWABLE ORAL 3 TIMES DAILY PRN
Status: DISCONTINUED | OUTPATIENT
Start: 2024-10-02 | End: 2024-10-06 | Stop reason: HOSPADM

## 2024-10-02 RX ORDER — DOXYLAMINE SUCCINATE 25 MG
TABLET ORAL 2 TIMES DAILY
Status: DISCONTINUED | OUTPATIENT
Start: 2024-10-02 | End: 2024-10-06 | Stop reason: HOSPADM

## 2024-10-02 RX ORDER — IODIXANOL 320 MG/ML
100 INJECTION, SOLUTION INTRAVASCULAR
Status: COMPLETED | OUTPATIENT
Start: 2024-10-02 | End: 2024-10-02

## 2024-10-02 RX ORDER — SEVELAMER CARBONATE 800 MG/1
1600 TABLET, FILM COATED ORAL
Status: DISCONTINUED | OUTPATIENT
Start: 2024-10-02 | End: 2024-10-05

## 2024-10-02 RX ORDER — KETOCONAZOLE 20 MG/ML
SHAMPOO, SUSPENSION TOPICAL EVERY OTHER DAY
Status: DISCONTINUED | OUTPATIENT
Start: 2024-10-03 | End: 2024-10-06 | Stop reason: HOSPADM

## 2024-10-02 RX ADMIN — METOPROLOL SUCCINATE 150 MG: 50 TABLET, FILM COATED, EXTENDED RELEASE ORAL at 03:10

## 2024-10-02 RX ADMIN — MICONAZOLE NITRATE: 20 CREAM TOPICAL at 11:10

## 2024-10-02 RX ADMIN — SEVELAMER CARBONATE 1600 MG: 800 TABLET, FILM COATED ORAL at 06:10

## 2024-10-02 RX ADMIN — FLUCONAZOLE 400 MG: 100 TABLET ORAL at 03:10

## 2024-10-02 RX ADMIN — HYDROMORPHONE HYDROCHLORIDE 2 MG: 2 TABLET ORAL at 04:10

## 2024-10-02 RX ADMIN — PIPERACILLIN SODIUM AND TAZOBACTAM SODIUM 4.5 G: 4; .5 INJECTION, POWDER, LYOPHILIZED, FOR SOLUTION INTRAVENOUS at 03:10

## 2024-10-02 RX ADMIN — HYDROMORPHONE HYDROCHLORIDE 2 MG: 2 TABLET ORAL at 11:10

## 2024-10-02 RX ADMIN — Medication 2 SPRAY: at 11:10

## 2024-10-02 RX ADMIN — OXYCODONE HYDROCHLORIDE 10 MG: 10 TABLET ORAL at 08:10

## 2024-10-02 RX ADMIN — HYDROMORPHONE HYDROCHLORIDE 0.5 MG: 1 INJECTION, SOLUTION INTRAMUSCULAR; INTRAVENOUS; SUBCUTANEOUS at 10:10

## 2024-10-02 RX ADMIN — HEPARIN SODIUM 4000 UNITS: 1000 INJECTION, SOLUTION INTRAVENOUS; SUBCUTANEOUS at 02:10

## 2024-10-02 RX ADMIN — EPOETIN ALFA-EPBX 10000 UNITS: 10000 INJECTION, SOLUTION INTRAVENOUS; SUBCUTANEOUS at 02:10

## 2024-10-02 RX ADMIN — GABAPENTIN 100 MG: 100 CAPSULE ORAL at 11:10

## 2024-10-02 RX ADMIN — OXYCODONE HYDROCHLORIDE AND ACETAMINOPHEN 1 TABLET: 10; 325 TABLET ORAL at 07:10

## 2024-10-02 RX ADMIN — Medication 2 SPRAY: at 09:10

## 2024-10-02 RX ADMIN — LINEZOLID 600 MG: 600 TABLET, FILM COATED ORAL at 11:10

## 2024-10-02 RX ADMIN — MICONAZOLE NITRATE: 20 CREAM TOPICAL at 03:10

## 2024-10-02 RX ADMIN — LINEZOLID 600 MG: 600 TABLET, FILM COATED ORAL at 03:10

## 2024-10-02 RX ADMIN — HYDRALAZINE HYDROCHLORIDE 100 MG: 25 TABLET ORAL at 11:10

## 2024-10-02 RX ADMIN — ISOSORBIDE MONONITRATE 30 MG: 30 TABLET, EXTENDED RELEASE ORAL at 04:10

## 2024-10-02 RX ADMIN — CLONIDINE HYDROCHLORIDE 0.3 MG: 0.1 TABLET ORAL at 11:10

## 2024-10-02 RX ADMIN — GABAPENTIN 100 MG: 100 CAPSULE ORAL at 03:10

## 2024-10-02 RX ADMIN — GABAPENTIN 100 MG: 100 CAPSULE ORAL at 08:10

## 2024-10-02 RX ADMIN — PIPERACILLIN SODIUM AND TAZOBACTAM SODIUM 4.5 G: 4; .5 INJECTION, POWDER, LYOPHILIZED, FOR SOLUTION INTRAVENOUS at 12:10

## 2024-10-02 RX ADMIN — IODIXANOL 100 ML: 320 INJECTION, SOLUTION INTRAVASCULAR at 11:10

## 2024-10-02 NOTE — NURSING
Pt. Stated that he pulled a small clot from his r nostril then he removed another one from the l nostril. Dr. Valaedz on unit making rounds, M.D. at bedside. New orders noted.

## 2024-10-02 NOTE — CARE UPDATE
Patient had a small clot from his nose today evening around 6:30 PM, likely secondary to the pressure and drying out from the NIV. He does mention he had something similar in the past with his CPAP. We reached out to respiratory and communication order was placed for a humidifier for tonight. Spoke to the nurse at the bedside about applying pressure. For nostrils drying out, ordered normal saline spray. Also ordered CBC in case he has worsening episodes.   Ordered Afrin spray as well for topical vasoconstriction in the nostrils. Heparin subcutaneous was discontinued. We will also reach out to respiratory to talk about adjusting EPAP and IPAP to reduce the driving pressure to an acceptable but a safer level.

## 2024-10-02 NOTE — ASSESSMENT & PLAN NOTE
-humidify the bipap  -Afrin BID  -Not acutely bleeding on my exam today  -Heparin SQ was held: will follow and resume if clinically appropriate.

## 2024-10-02 NOTE — PROGRESS NOTES
[facility]   Department of Infectious Disease  Progress Note        PATIENT NAME: João Ham  MRN: [id number]  TODAY'S DATE: 10/02/2024  ADMIT DATE: 9/17/2024  LOS: 15 days    CHIEF COMPLAINT: Fatigue and Groin Swelling (X 2 days.  Hx of Kidney failure and CHF)      PRINCIPLE PROBLEM: Inguinal hernia of left side with gangrene    INTERVAL HISTORY      09/23/2024: Seen and evaluated at bedside.  No acute issues.  Leukocytosis persists.  Afebrile.      09/24/2024:  Leukocytosis.  Remains afebrile.  Fluid in BERNABE drain remains dark green.  Seen by vascular surgeon with plan for left upper extremity AV fistula fistulogram.    09/25/2024: Leukocytosis persists.  He is otherwise stable.  Left inguinal drainage fluid sent for Gram stain and culture.  G stain was negative.  Culture in progress.  He remains on broad-spectrum antimicrobials.  He was seen by urologist yesterday with plan for conservative management of left scrotal edema with orchitis.  Repeat CT this morning showed gas in the abdomen resident concern for anastomotic leak.     09/26/2024:  He had exploratory laparotomy yesterday with colon resection and end colostomy, mobilization of splenic flexure and left inguinal canal exploration.  A Penrose drain was placed in the inguinal canal.  Left IJ Vas-Cath was removed yesterday.    09/27/2024:  Leukocytosis.  Fever cough trending down.  Cultures from surgery so far negative.  Pain control better.    09/28/2024:  WBC back up to 24 K. cultures have grown E coli so far with 1 strain resistant to ceftriaxone and tetracycline.  Having nausea and is on clear liquid diet.  States he is Not feeling good.    09/29/2024:  He is feeling much better.  WBC down to 20 K.  Tolerating oral feeds.  KUB showed gas-filled bowel loops on the left side concerning for ileus.  Also showed lateral abdominal wall gas.      09/30/2024:  Continues to improve.  WBC down to 15 K.  No other acute issues overnight.    10/01/2024:  Seen and evaluated at bedside.  Continues to improve.  No acute issues overnight.  Tolerating oral feeds.    10/02/2024:  Penrose drain pulled from the left groin and scrotum..  Continues to improve.  Pending disposition    Antibiotics (From admission, onward)      Start     Stop Route Frequency Ordered    09/27/24 1015  linezolid 600 mg/300 mL IVPB 600 mg         -- IV Every 12 hours (non-standard times) 09/27/24 0909    09/24/24 1800  piperacillin-tazobactam 4.5 g in dextrose 5 % 100 mL IVPB (ready to mix)         -- IV Every 12 hours (non-standard times) 09/24/24 1035    09/17/24 2100  mupirocin 2 % ointment         09/22/24 2059 Nasl 2 times daily 09/17/24 1845          Antifungals (From admission, onward)      Start     Stop Route Frequency Ordered    09/22/24 1930  fluconazole (DIFLUCAN) IVPB 200 mg         -- IV Every 24 hours (non-standard times) 09/22/24 1520           Antivirals (From admission, onward)      None            ASSESSMENT and PLAN      1. Incarcerated left inguinal hernia with necrosis and left inguinal abscess.  Had I&D of abscess, partial sigmoid colectomy with primary anastomosis on 09/17/2024.  Then had anastomotic leak and went back to surgery 09/25/2024 for laparotomy, colectomy and colostomy with of left inguinal area.  Continue antibiotics    2. Seborrheic dermatitis and seborrheic capitis.  Miconazole cream to face.  Nizoral shampoo to scalp and beard area.    3. Bilateral epididymo-orchitis worse on the left.  Resolving.  Antibiotics as above.    4. ESRD on hemodialysis Monday/ Wednesday/Friday.  Status post left upper extremity fistulogram.  He is on transplant list.     RECOMMENDATIONS:    Continue current antimicrobial  Plan to treat for about 14 day from last surgery i.e. through 10/09/2024  Switch linezolid and fluconazole to p.o. and monitor    Please send Epic secure chat with any questions.      SUBJECTIVE      Antibiotics   Vancomycin: 09/17/2024-09/20/2024   Clindamycin:   09/07/2020 04/09/2020 2/24   Cefepime: 09/07/2024-09/22/2024   Daptomycin: 09/22/2024-  Fluconazole: 09/22/2024-  Zosyn: 09/22/2024-    Microbiology  Blood culture 09/07/2024: NGTD   Urine culture 09/19/2024: NGTD   Blood culture 09/24/2024:  NGTD   Left groin drainage culture 09/24/2024:  E coli in broth only resistant to tetracycline, cefazolin, ampicillin and ceftriaxone  Abdomen incision site culture 09/24/2024:  E coli in broth only sensitive to ceftriaxone and tetracycline    Review of Systems  Negative except as stated above in Interval History     OBJECTIVE   Temp:  [98.1 °F (36.7 °C)-100.1 °F (37.8 °C)] 98.2 °F (36.8 °C)  Pulse:  [] 95  Resp:  [16-20] 16  SpO2:  [94 %-95 %] 95 %  BP: (119-150)/(69-90) 142/74  Temp:  [98.1 °F (36.7 °C)-100.1 °F (37.8 °C)]   Temp: 98.2 °F (36.8 °C) (10/02/24 0716)  Pulse: 95 (10/02/24 0718)  Resp: 16 (10/02/24 0858)  BP: (!) 142/74 (10/02/24 0716)  SpO2: 95 % (10/02/24 0718)    Intake/Output Summary (Last 24 hours) at 10/2/2024 0921  Last data filed at 10/2/2024 0830  Gross per 24 hour   Intake 950 ml   Output 215 ml   Net 735 ml       Physical Exam  General:  Middle-aged man lying quietly in bed.  Looks much better today  Abdomen:  Distended.  Dry dressing midline.  Colostomy left lower abdomen with BERNABE drain.  Dressing left groin area with wound packing.  Genitals:  Firm Edematous scrotum worse on the left.  Nontender  CVS: S1 and 2 heard, no murmurs appreciated   Respiratory: Clear to auscultation   Skin:  Dry scaly rash on face, beard area and scab.  Musculoskeletal system: No joint or bony abnormalities appreciated   CNS: No gross focal deficits  Psych: Good mood, normal affect.    VAD:  Right radial arterial line, PIV  ISOLATION:  None    WOUNDS:  Abdominal surgical wounds, open left groin wound with packing    Significant Labs: All pertinent labs within the past 24 hours have been reviewed.    CBC LAST 7 DAYS  Recent Labs   Lab 09/26/24  0501 09/26/24  0954  "09/27/24  0359 09/28/24  0403 09/29/24  1110 09/30/24  0627 10/01/24  0546 10/02/24  0558   WBC 20.66*  --  20.31* 24.45* 19.88* 15.43* 15.59* 15.46*   RBC 3.00*  --  2.66* 2.87* 2.77* 2.51* 2.85* 2.72*   HGB 8.9* 8.2* 7.9* 8.5* 8.5* 7.4* 8.3* 8.1*   HCT 27.7*  --  25.3* 27.7* 27.4* 24.3* 28.1* 26.9*   MCV 92  --  95 97 99* 97 99* 99*   MCH 29.7  --  29.7 29.6 30.7 29.5 29.1 29.8   MCHC 32.1  --  31.2* 30.7* 31.0* 30.5* 29.5* 30.1*   RDW 23.3*  --  23.1* 22.5* 22.7* 21.5* 21.5* 21.4*     --  466* 560* 560* 482* 505* 497*   MPV 10.5  --  10.5 10.2 10.7 10.2 9.6 10.0   GRAN 81.0*  --  73.0 76.0* 72.0 62.0 64.0 55.0   LYMPH 0.0*  --  6.0* 1.0* 11.0* 10.0* 10.0* 18.0  CANCELED   MONO 8.0  --  7.0 5.0 7.0 11.0 15.0 17.0*  CANCELED   BASO  --   --   --   --   --   --   --  CANCELED   NRBC 0  --  0 0 0 1* 1* 0       CHEMISTRY LAST 7 DAYS  Recent Labs   Lab 09/25/24  1051 09/25/24  1737 09/26/24  0501 09/27/24  0359 09/28/24  0403 09/30/24  0627 10/01/24  0546 10/02/24  0558   NA  --  136 135* 137 137 134* 136 133*   K  --  4.4 5.3* 4.7 4.6 4.0 4.7 4.3   CL  --  98 96 97 97 94* 96 94*   CO2  --  18* 24 24 23 22* 23 19*   ANIONGAP  --  20* 15 16 17* 18* 17* 20*   BUN  --  51* 39* 33* 37* 63* 44* 53*   CREATININE  --  13.4* 11.4* 10.3* 10.1* 15.5* 13.0* 15.2*   GLU  --  108 110 106 112* 105 109 114*   CALCIUM  --  8.7 8.7 9.0 9.7 8.7 9.4 9.3   PH 7.404  --   --   --   --   --   --   --    MG  --  2.2 2.0 2.2 2.7* 2.5 2.5 2.4   ALBUMIN  --  3.0* 2.9* 2.9* 3.2* 3.0* 3.4* 3.3*   PROT  --  7.4 7.3 7.3 8.3 7.8 8.6* 8.4   ALKPHOS  --  152* 124 106 133 114 117 110   ALT  --  45* 37 26 33 38 36 32   AST  --  49* 38 36 54* 43* 34 31   BILITOT  --  0.9 0.7 0.6 0.5 0.4 0.4 0.4       Estimated Creatinine Clearance: 9.1 mL/min (A) (based on SCr of 15.2 mg/dL (H)).    INFLAMMATORY/PROCAL  LAST 7 DAYS  No results for input(s): "PROCAL", "ESR", "CRP" in the last 168 hours.    No results found for: "ESR"  CRP   Date Value Ref Range " Status   09/24/2024 35.70 (H) <1.00 mg/dL Final     Comment:     CRP-Normal Application expected values:   <1.0        mg/dL   Normal Range  1.0 - 5.0  mg/dL   Indicates mild inflammation  5.0 - 10.0 mg/dL   Indicates severe inflammation  >10.0        mg/dL   Represents serious processes and   frequently         indicates the presence of a bacterial   infection.      09/20/2024 >16.00 (H) <1.00 mg/dL Final     Comment:     CRP-Normal Application expected values:   <1.0        mg/dL   Normal Range  1.0 - 5.0  mg/dL   Indicates mild inflammation  5.0 - 10.0 mg/dL   Indicates severe inflammation  >10.0        mg/dL   Represents serious processes and   frequently         indicates the presence of a bacterial   infection.      07/16/2024 >16.00 (H) <1.00 mg/dL Final     Comment:     CRP-Normal Application expected values:   <1.0        mg/dL   Normal Range  1.0 - 5.0  mg/dL   Indicates mild inflammation  5.0 - 10.0 mg/dL   Indicates severe inflammation  >10.0        mg/dL   Represents serious processes and   frequently         indicates the presence of a bacterial   infection.          PRIOR  MICROBIOLOGY:    Susceptibility data from last 90 days.  Collected Specimen Info Organism Amp/Sulbactam Ampicillin Cefazolin Cefepime Ceftriaxone Ciprofloxacin Clindamycin Ertapenem Erythromycin Gentamicin Levofloxacin Meropenem Oxacillin Penicillin   09/25/24 Incision site from Abdomen Parabacteroides distasonis                 09/25/24 Incision site from Abdomen Escherichia coli  I  R  R  S  S  S   S   S  S  S     09/25/24 Blood from Peripheral, Antecubital, Right No growth after 5 days.                 09/25/24 Blood from Peripheral, Forearm, Right No growth after 5 days.                 09/24/24 Incision site from Groin Escherichia coli  R  R  R  S  R  S   S   S  S  S     09/24/24 Blood No growth after 5 days.                 09/24/24 Blood No growth after 5 days.                 09/17/24 Blood from Peripheral, Antecubital,  Right No growth after 5 days.                 09/17/24 Blood from Peripheral, Antecubital, Right No growth after 5 days.                 07/19/24 Catheter Tip, Tunneled Staphylococcus aureus      S   S   I     S  R   07/16/24 Blood from Peripheral, Forearm, Right Staphylococcus aureus                 07/15/24 Blood from Peripheral, Antecubital, Right Staphylococcus aureus      S   S   S     S  R   07/15/24 Blood from Peripheral, Hand, Right Staphylococcus aureus                   Collected Specimen Info Organism PIPERACILLIN/TAZO SUSCEPTIBILITY Tetracycline Tobramycin Trimeth/Sulfa Vancomycin   09/25/24 Incision site from Abdomen Parabacteroides distasonis        09/25/24 Incision site from Abdomen Escherichia coli  S  S  S  S    09/25/24 Blood from Peripheral, Antecubital, Right No growth after 5 days.        09/25/24 Blood from Peripheral, Forearm, Right No growth after 5 days.        09/24/24 Incision site from Groin Escherichia coli  S  R  S  S    09/24/24 Blood No growth after 5 days.        09/24/24 Blood No growth after 5 days.        09/17/24 Blood from Peripheral, Antecubital, Right No growth after 5 days.        09/17/24 Blood from Peripheral, Antecubital, Right No growth after 5 days.        07/19/24 Catheter Tip, Tunneled Staphylococcus aureus   S   S  S   07/16/24 Blood from Peripheral, Forearm, Right Staphylococcus aureus        07/15/24 Blood from Peripheral, Antecubital, Right Staphylococcus aureus   S   S  S   07/15/24 Blood from Peripheral, Hand, Right Staphylococcus aureus            LAST 7 DAYS MICROBIOLOGY   Microbiology Results (last 7 days)       Procedure Component Value Units Date/Time    Culture, Anaerobe [[id number]]  (Abnormal) Collected: 09/25/24 1413    Order Status: Completed Specimen: Incision site from Abdomen Updated: 10/01/24 1418     Anaerobic Culture PARABACTEROIDES DISTASONIS  Few  Beta Lactamase positive      Blood culture [[id number]] Collected: 09/25/24 1111    Order Status:  Completed Specimen: Blood from Peripheral, Antecubital, Right Updated: 09/30/24 1432     Blood Culture, Routine No growth after 5 days.    Narrative:      [id number]    Blood culture [[id number]] Collected: 09/25/24 1111    Order Status: Completed Specimen: Blood from Peripheral, Forearm, Right Updated: 09/30/24 1432     Blood Culture, Routine No growth after 5 days.    Narrative:      [id number]    Blood culture [[id number]] Collected: 09/24/24 1513    Order Status: Completed Specimen: Blood Updated: 09/29/24 1632     Blood Culture, Routine No growth after 5 days.    Narrative:      Collection has been rescheduled by [email or web address] at 09/23/2024 14:35 Reason:   Patient unavailable dialysis   Collection has been rescheduled by MYB at 09/23/2024 18:44 Reason:   Unable to collect  Collection has been rescheduled by CZB at 09/23/2024 19:02 Reason:   Unable to collect  Collection has been rescheduled by RE1 at 09/24/2024 09:43 Reason:   Spoke to the patient's nurse about unable to collect she is   contacting Lambert and the doctor  Collection has been rescheduled by [email or web address] at 09/23/2024 14:35 Reason:   Patient unavailable dialysis   Collection has been rescheduled by MYB at 09/23/2024 18:44 Reason:   Unable to collect  Collection has been rescheduled by CZB at 09/23/2024 19:02 Reason:   Unable to collect  Collection has been rescheduled by RE1 at 09/24/2024 09:43 Reason:   Spoke to the patient's nurse about unable to collect she is   contacting [facility] and the doctor    Blood culture [[id number]] Collected: 09/24/24 1122    Order Status: Completed Specimen: Blood Updated: 09/29/24 1232     Blood Culture, Routine No growth after 5 days.    Narrative:      Collection has been rescheduled by [email or web address] at 09/23/2024 14:35 Reason:   Patient unavailable dialysis   Collection has been rescheduled by MYB at 09/23/2024 18:44 Reason:   Unable to collect  Collection has been rescheduled by CZB at 09/23/2024 19:02 Reason:   Unable to collect  Collection has been  rescheduled by RE1 at 09/24/2024 09:43 Reason:   Spoke to the patient's nurse about unable to collect she is   contacting Lambert and the doctor  Collection has been rescheduled by [email or web address] at 09/23/2024 14:35 Reason:   Patient unavailable dialysis   Collection has been rescheduled by MYB at 09/23/2024 18:44 Reason:   Unable to collect  Collection has been rescheduled by CZB at 09/23/2024 19:02 Reason:   Unable to collect  Collection has been rescheduled by RE1 at 09/24/2024 09:43 Reason:   Spoke to the patient's nurse about unable to collect she is   contacting Lambert and the doctor    AFB Culture & Smear [[id number]] Collected: 09/25/24 1413    Order Status: Completed Specimen: Incision site from Abdomen Updated: 09/28/24 1630     AFB CULTURE STAIN No acid fast bacilli seen.     AFB CULTURE STAIN Testing performed by:     AFB CULTURE STAIN [facility]     AFB CULTURE STAIN [address] [place] [address]     [place] [address]     AFB CULTURE STAIN Dr. Osbaldo Sherwood MD    IV catheter culture [[id number]] Collected: 09/25/24 1132    Order Status: Completed Specimen: Catheter Tip, Dialysis Updated: 09/28/24 1113     Aerobic Culture - Cath tip No growth    Narrative:      Trialysis    Aerobic culture [[id number]]  (Abnormal)  (Susceptibility) Collected: 09/24/24 1535    Order Status: Completed Specimen: Incision site from Groin Updated: 09/28/24 0738     Aerobic Bacterial Culture ESCHERICHIA COLI  From broth only      Narrative:      Left groin incision site drainage swab    Aerobic culture [[id number]]  (Abnormal)  (Susceptibility) Collected: 09/25/24 1413    Order Status: Completed Specimen: Incision site from Abdomen Updated: 09/28/24 0737     Aerobic Bacterial Culture ESCHERICHIA COLI  From broth only      Gram stain [[id number]] Collected: 09/25/24 1413    Order Status: Completed Specimen: Incision site from Abdomen Updated: 09/27/24 1530     Gram Stain Result Moderate WBC's      No organisms seen     Fungus culture [[id number]] Collected: 09/25/24 1413    Order Status: Sent Specimen: Incision site from Abdomen Updated: 09/25/24 8704            CURRENT/PREVIOUS VISIT EKG  Results for orders placed or performed during the hospital encounter of 09/17/24   EKG 12-lead    Collection Time: 09/17/24  8:57 AM   Result Value Ref Range    QRS Duration 92 ms    [facility] QTC Calculation 469 ms    Narrative    Test Reason : N18.6,    Vent. Rate : 119 BPM     Atrial Rate : 119 BPM     P-R Int : 134 ms          QRS Dur : 092 ms      QT Int : 334 ms       P-R-T Axes : 027 -78 004 degrees     QTc Int : 469 ms    Sinus tachycardia  Left axis deviation  Cannot rule out Anterior infarct ,age undetermined  Abnormal ECG  When compared with ECG of 15-JUL-2024 17:47,  T wave inversion now evident in Inferior leads  T wave inversion no longer evident in Lateral leads  Confirmed by Jose Cruz MD ([id number]) on 9/22/2024 1:02:21 PM    Referred By: [email or web address]   SELF           Confirmed By:Jose Cruz MD     Significant Imaging: I have reviewed all relevant and available imaging results/findings within the past 24 hours.    I spent a total of 45 minutes on the day of the visit.This includes face to face time and non-face to face time preparing to see the patient (eg, review of tests), obtaining and/or reviewing separately obtained history, documenting clinical information in the electronic or other health record, independently interpreting results and communicating results to the patient/family/caregiver, or care coordinator.    Kerwin Au MD  Date of Service: 10/02/2024      This note was created using [employer] voice recognition software that occasionally misinterpreted phrases or words.

## 2024-10-02 NOTE — PT/OT/SLP PROGRESS
Physical Therapy      Patient Name:  João Ham   MRN:  [id number]    Patient not seen today secondary to receiving dialysis. Will follow-up 10/3.

## 2024-10-02 NOTE — NURSING
Pt HR is 125     Temp 99.8       he is stating his lower abdomen is hurting like it was earlier today    I gave him Dilauded 2 mg PO about 30 minutes ago   HE is stating he is having stabbing pains

## 2024-10-02 NOTE — PROGRESS NOTES
"[facility]  Adult Nutrition   Progress Note (Follow-Up)    SUMMARY     Recommendations  Recommendation/Intervention: 1. Continue Regular diet as tolerated.  2. Change supplement to Ensure +HP BID.  Goals: 1. Diet will advance by follow up.  Nutrition Goal Status: progressing towards goal  Communication of RD Recs: discussed on rounds    Nutrition Diagnosis PES Statement: Altered GI function related to bowel resection as evidenced by patient post-op with ostomy. Patient dislikes Ensure clear per , will try Ensure +HP vanilla.    Dietitian Rounds Brief  Patient off unit in dialysis at visit.  reports pt dislikes Ensure Clear and wants to try Ensure +HP vanilla. RD to follow for supplement tolerance/change PRN.    Nutrition Related Social Determinants of Health: SDOH: None Identified    Malnutrition Assessment   N/A              Diet order:   Current Diet Order: Regular   Oral Nutrition Supplement: Ensure Clear. Patient dislikes did trial of Ensure Vanilla             Evaluation of Received Nutrient/Fluid Intake  Energy Calories Required: not meeting needs  Protein Required: not meeting needs  Fluid Required: meeting needs  Tolerance: tolerating     % Intake of Estimated Energy Needs: 25 - 50 %  % Meal Intake: 25 - 50 %      Intake/Output Summary (Last 24 hours) at 10/2/2024 1443  Last data filed at 10/2/2024 1030  Gross per 24 hour   Intake 830 ml   Output 415 ml   Net 415 ml        Anthropometrics  Temp: 98.2 °F (36.8 °C)  Height: 6' 2" (188 cm)  Height (inches): 74 in  Weight Method: Bed Scale  Weight: 134 kg (295 lb 6.7 oz)  Weight (lb): 295.42 lb  Ideal Body Weight (IBW), Male: 190 lb  % Ideal Body Weight, Male (lb): 148.95 %  BMI (Calculated): 37.9       Estimated/Assessed Needs  Weight Used For Calorie Calculations: 134 kg (295 lb 6.7 oz)  Energy Calorie Requirements (kcal): 5258-1519 kcal/day (11-14 kcal/kg)  Energy Need Method: Kcal/kg  Protein Requirements: 129-172 gm/day (1.5-2.0 " gm/kg IBW / acute HD pt)  Weight Used For Protein Calculations: 86 kg (189 lb 9.5 oz) (IBW)  Fluid Requirements (mL): 24 hour UOP + 1000 mL or per MD  Estimated Fluid Requirement Method: other (see comments)  RDA Method (mL): 1474  CHO Requirement: 184-234 gm/day (12-15 cho servings/day)    Reason for Assessment  Reason For Assessment: RD follow-up  Diagnosis: surgery/postoperative complications, gastrointestinal disease  General Information Comments: Patient reported dislike of Ensure Clear and would like to tray Ensure Vanilla.  Nutrition Discharge Planning: Regular diet. Ensure as tolerated.    Nutrition/Diet History  Spiritual, Cultural Beliefs, Adventist Practices, Values that Affect Care: no  Food Allergies: other (see comments) (MUSHROOM)    Nutrition Risk Screen  Nutrition Risk Screen: no indicators present       Wound 09/25/24 1521 Incision Left anterior Groin-Wound Image: Images linked       Colostomy 09/25/24 1200 LLQ-Wound Image: Images linked (Stoma measures 35mm x 40mm)       Wound 09/17/24 Incision Abdomen laparoscopic punctures (specify)-Wound Image: Images linked  MST Score: 0  Have you recently lost weight without trying?: No  Weight loss score: 0  Have you been eating poorly because of a decreased appetite?: No  Appetite score: 0       Weight History:  Wt Readings from Last 10 Encounters:   09/18/24 134 kg (295 lb 6.7 oz)   08/29/24 131.3 kg (289 lb 7.4 oz)   08/22/24 131 kg (288 lb 12.8 oz)   07/30/24 131.1 kg (289 lb 0.4 oz)   07/17/24 129.3 kg (285 lb)   07/16/24 128.3 kg (282 lb 13.6 oz)   06/25/24 129.3 kg (285 lb 0.9 oz)   04/30/24 129.3 kg (285 lb)   02/27/24 133 kg (293 lb 3.4 oz)   02/21/24 136 kg (299 lb 13.2 oz)        Lab/Procedures/Meds: Pertinent Labs/Meds Reviewed    Medications:Pertinent Medications Reviewed  Scheduled Meds:   cloNIDine  0.3 mg Oral TID    epoetin mily-epbx  10,000 Units Intravenous Every Mon, Wed, Fri    [START ON 10/3/2024] fluconazole  100 mg Oral Daily     fluconazole  400 mg Oral Once    gabapentin  100 mg Oral TID    hydrALAZINE  100 mg Oral Q8H    HYDROmorphone  1 mg Intravenous Once    isosorbide mononitrate  30 mg Oral Daily    [START ON 10/3/2024] ketoconazole   Topical (Top) Every Other Day    LIDOcaine-prilocaine   Topical (Top) Once    linezolid  600 mg Oral Q12H    losartan  100 mg Oral Daily    metoprolol succinate  150 mg Oral Daily    miconazole   Topical (Top) BID    oxymetazoline  2 spray Each Nostril BID    piperacillin-tazobactam (Zosyn) IV (PEDS and ADULTS) (extended infusion is not appropriate)  4.5 g Intravenous Q12H     Continuous Infusions:  PRN Meds:.  Current Facility-Administered Medications:     0.9% NaCl, , Intravenous, PRN    acetaminophen, 650 mg, Oral, Q4H PRN    ALPRAZolam, 0.25 mg, Oral, Nightly PRN    aluminum-magnesium hydroxide-simethicone, 30 mL, Oral, QID PRN    dextrose 10%, 12.5 g, Intravenous, PRN    dextrose 10%, 12.5 g, Intravenous, PRN    dextrose 10%, 12.5 g, Intravenous, PRN    dextrose 10%, 25 g, Intravenous, PRN    dextrose 10%, 25 g, Intravenous, PRN    dextrose 10%, 25 g, Intravenous, PRN    glucagon (human recombinant), 1 mg, Intramuscular, PRN    glucose, 16 g, Oral, PRN    glucose, 24 g, Oral, PRN    heparin (porcine), 4,000 Units, Intravenous, PRN    HYDROmorphone, 2 mg, Oral, Q4H PRN    insulin aspart U-100, 0-5 Units, Subcutaneous, QID (AC + HS) PRN    iohexoL, 100 mL, Intravenous, ONCE PRN    LIDOcaine-prilocaine, , Topical (Top), PRN    magnesium oxide, 800 mg, Oral, PRN    magnesium oxide, 800 mg, Oral, PRN    melatonin, 6 mg, Oral, Nightly PRN    naloxone, 0.02 mg, Intravenous, PRN    ondansetron, 4 mg, Intravenous, Q6H PRN    oxyCODONE-acetaminophen, 1 tablet, Oral, Q4H PRN    potassium bicarbonate, 35 mEq, Oral, PRN    potassium bicarbonate, 50 mEq, Oral, PRN    potassium bicarbonate, 60 mEq, Oral, PRN    potassium, sodium phosphates, 2 packet, Oral, PRN    potassium, sodium phosphates, 2 packet, Oral, PRN    " potassium, sodium phosphates, 2 packet, Oral, PRN    promethazine (PHENERGAN) 12.5 mg in 0.9% NaCl 50 mL IVPB, 12.5 mg, Intravenous, Q6H PRN    sodium chloride 0.9%, 250 mL, Intravenous, PRN    sodium chloride 0.9%, 3 mL, Intravenous, Q12H PRN    Labs: Pertinent Labs Reviewed  Clinical Chemistry:  Recent Labs   Lab 09/30/24  0627 10/01/24  0546 10/02/24  0558   * 136 133*   K 4.0 4.7 4.3   CL 94* 96 94*   CO2 22* 23 19*    109 114*   BUN 63* 44* 53*   CREATININE 15.5* 13.0* 15.2*   CALCIUM 8.7 9.4 9.3   PROT 7.8 8.6* 8.4   ALBUMIN 3.0* 3.4* 3.3*   BILITOT 0.4 0.4 0.4   ALKPHOS 114 117 110   AST 43* 34 31   ALT 38 36 32   ANIONGAP 18* 17* 20*   MG 2.5 2.5 2.4     CBC:   Recent Labs   Lab 10/02/24  0558   WBC 15.46*   RBC 2.72*   HGB 8.1*   HCT 26.9*   *   MCV 99*   MCH 29.8   MCHC 30.1*     Lipid Panel:  No results for input(s): "CHOL", "HDL", "LDLCALC", "TRIG", "CHOLHDL" in the last 168 hours.  Cardiac Profile:  No results for input(s): "BNP", "CPK", "CPKMB", "TROPONINI", "CKTOTAL" in the last 168 hours.  Inflammatory Labs:  No results for input(s): "CRP" in the last 168 hours.  Diabetes:  Recent Labs   Lab 10/01/24  1638 10/01/24  2007 10/02/24  0719   POCTGLUCOSE 132* 138* 126*     Thyroid & Parathyroid:  No results for input(s): "TSH", "FREET4", "M9NTFUV", "T2KBUFQ", "THYROIDAB" in the last 168 hours.    Monitor and Evaluation  Food and Nutrient Intake: energy intake, food and beverage intake  Food and Nutrient Adminstration: diet order  Knowledge/Beliefs/Attitudes: food and nutrition knowledge/skill  Physical Activity and Function: nutrition-related ADLs and IADLs  Anthropometric Measurements: body mass index, weight change, weight  Biochemical Data, Medical Tests and Procedures: electrolyte and renal panel, gastrointestinal profile, glucose/endocrine profile, inflammatory profile, lipid profile  Nutrition-Focused Physical Findings: overall appearance     Nutrition Risk  Level of " Risk/Frequency of Follow-up:  (1 x / week)     Nutrition Follow-Up  RD Follow-up?: Yes

## 2024-10-02 NOTE — ASSESSMENT & PLAN NOTE
"Patient with Hypoxic Respiratory failure which is Acute.  he is not on home oxygen. Supplemental oxygen was provided and noted- Oxygen Concentration (%):  [28] 28  Continue nightly BiPAP and supplemental O2, weaning as tolerated.  Pulmonology signed off 9/29    ABG  No results for input(s): "PH", "PO2", "PCO2", "HCO3", "BE" in the last 168 hours.  =================================  Had issues with some nasal bleeding/clotting.  -Bipap humidification  "

## 2024-10-02 NOTE — PROGRESS NOTES
10/02/24 1030   WOCN Assessment   WOCN Total Time (mins) 90   Visit Date 10/02/24   Visit Time 0900   Consult Type Follow Up   WOCN Speciality Wound;Ostomy   WOCN List colostomy   Wound surgical   Number of Wounds 3   Ostomy Type Colostomy   Procedure ostomy pouch   Intervention assessed;changed;applied;chart review;coordination of care;team conference;orders   Teaching on-going        Wound 09/17/24 Incision Abdomen laparoscopic punctures (specify)   Date First Assessed: 09/17/24   Present on Original Admission: No  Primary Wound Type: Incision  Location: Abdomen  Incision Type: laparoscopic punctures (specify)  Closure Method: Other (see comments)  Additional Comments: steris, band aids   Wound Image    Incision WDL WDL   Dressing Appearance Open to air   Drainage Amount None   Appearance Intact        Wound 09/25/24 1415 Incision midline Abdomen midline;vertical   Date First Assessed/Time First Assessed: 09/25/24 1415   Primary Wound Type: Incision  Orientation: midline  Location: Abdomen  Incision Type: midline;vertical  Closure Method: Sutures   Incision WDL ex   Dressing Appearance Dried drainage;Moist drainage   Drainage Amount Scant   Drainage Characteristics/Odor Serosanguineous   Appearance Pink   Care Cleansed with:;Antimicrobial agent;Wound cleanser;Applied:;Skin Barrier   Dressing Applied;Silver;Gauze        Wound 09/25/24 1521 Incision Left anterior Groin   Date First Assessed/Time First Assessed: 09/25/24 1521   Present on Original Admission: No  Primary Wound Type: Incision  Side: Left  Orientation: anterior  Location: Groin  Closure Method: (c) No Closure (see comments)   Wound Image    Dressing Appearance Moist drainage   Drainage Amount Moderate   Drainage Characteristics/Odor Purulent   Appearance Red;Pink   Periwound Area Moist   Wound Edges Open   Care Cleansed with:;Antimicrobial agent;Wound cleanser;Applied:;Skin Barrier   Dressing Applied;Silver;Gauze;Absorptive Pad   Packing packed  with  (Aquacel Ag Advantage)   Dressing Change Due 10/03/24        Colostomy 09/25/24 1200 LLQ   Placement Date/Time: 09/25/24 (c) 1200   Inserted by: MD  Location: LLQ   Wound Image   (Stoma measures 35mm x 40mm)   Stomal Appliance 1 piece   Stoma Appearance swollen;moist;pink   Site Assessment Intact   Peristomal Assessment Intact   Accessories/Skin Care skin sealant;skin barrier ring;wafer barrier over peristomal skin;cleansed w/ water   Stoma Function stool;flatus;brown   Treatment Bag change   Output (mL) 200 mL     Stoma measures 35mm x 40mm.   Patient with purulent drainage from the left groin wound as ID MD assessed site. Cleaned area with wound cleanser and dried inner wound. Wound bed is beefy red and no pockets of purulence noted with probing the inner most wound beds. Packed the wound to the left groin with Aquacel Ag Advantage and covered with gauze and an abd pad and secured with paper tape. Left scrotal wound cleaned with wound cleanser and patted dry. Applied a cut piece of Aquacel Ag Advantage and placed over the scrotal wound then covered with a gauze and secured with tape. The lower end of the abdominal surgical incision site is noted with yellow drainage scant amount with surgical dressing removal. Cleaned the lower edge of the surgical incision with wound cleanser and patted dry and applied a cut strip of Aquacel Ag Advantage and applied over the lower incision site then covered with a gauze and secured with paper tape. Changed ostomy appliance and further discussed ostomy education with the patient. Patient does complain of pain before, during and after care done. Nurse is aware of patients pain. Wound/Ostomy nurse to continue to follow this patient throughout hospital stay.

## 2024-10-02 NOTE — SUBJECTIVE & OBJECTIVE
Interval History: Patient seen and examined reports increased abdominal pain-has been trying to avoid the IV Dilaudid and stick with the oxycodone-does not feel it is working.  No chest pain or shortness a breath.    Wound care nurse at bedside.  Reports ID physician was able to express some pus superficially in the groin incision, she then did her wound care with no further findings of purulent drainage.    He has been accepted to [facility].    I initially saw the Pt at 10:00 a.m., but I am doing my note later in the day and note that he is tachycardic up to 129-upon review of the ori, he has not received his metoprolol in several days-a dose was just given at time of my note writing.    Review of Systems   Constitutional:  Negative for chills and fatigue.   Respiratory:  Negative for cough and shortness of breath.    Cardiovascular:  Negative for chest pain and leg swelling.   Gastrointestinal:  Positive for abdominal pain. Negative for nausea and vomiting.     Objective:     Vital Signs (Most Recent):  Temp: 98.1 °F (36.7 °C) (10/02/24 1430)  Pulse: (!) 129 (10/02/24 1526)  Resp: 20 (10/02/24 1430)  BP: 123/89 (10/02/24 1526)  SpO2: 95 % (10/02/24 1430) Vital Signs (24h Range):  Temp:  [98.1 °F (36.7 °C)-100.1 °F (37.8 °C)] 98.1 °F (36.7 °C)  Pulse:  [] 129  Resp:  [16-20] 20  SpO2:  [94 %-96 %] 95 %  BP: (108-192)/() 123/89     Weight: 134 kg (295 lb 6.7 oz)  Body mass index is 37.93 kg/m².    Intake/Output Summary (Last 24 hours) at 10/2/2024 1534  Last data filed at 10/2/2024 1430  Gross per 24 hour   Intake 1330 ml   Output 4715 ml   Net -3385 ml         Physical Exam  Vitals and nursing note reviewed.   Constitutional:       Appearance: Normal appearance. He is obese.   HENT:      Head: Normocephalic and atraumatic.   Eyes:      Conjunctiva/sclera: Conjunctivae normal.      Pupils: Pupils are equal, round, and reactive to light.   Cardiovascular:      Rate and Rhythm: Normal rate and  regular rhythm.   Pulmonary:      Effort: Pulmonary effort is normal.      Breath sounds: Normal breath sounds.   Abdominal:      General: Abdomen is flat. Bowel sounds are normal. There is no distension.      Palpations: Abdomen is soft.      Tenderness: There is abdominal tenderness.      Comments: Dressings CDI   Skin:     General: Skin is warm and dry.      Capillary Refill: Capillary refill takes less than 2 seconds.   Neurological:      General: No focal deficit present.      Mental Status: He is alert and oriented to person, place, and time. Mental status is at baseline.   Psychiatric:         Mood and Affect: Mood normal.             Significant Labs: All pertinent labs within the past 24 hours have been reviewed.  CBC:   Recent Labs   Lab 10/01/24  0546 10/02/24  0558   WBC 15.59* 15.46*   HGB 8.3* 8.1*   HCT 28.1* 26.9*   * 497*     CMP:   Recent Labs   Lab 10/01/24  0546 10/02/24  0558    133*   K 4.7 4.3   CL 96 94*   CO2 23 19*    114*   BUN 44* 53*   CREATININE 13.0* 15.2*   CALCIUM 9.4 9.3   PROT 8.6* 8.4   ALBUMIN 3.4* 3.3*   BILITOT 0.4 0.4   ALKPHOS 117 110   AST 34 31   ALT 36 32   ANIONGAP 17* 20*     Microbiology Results (last 7 days)       Procedure Component Value Units Date/Time    Culture, Anaerobe [[id number]]  (Abnormal) Collected: 09/25/24 1413    Order Status: Completed Specimen: Incision site from Abdomen Updated: 10/01/24 1418     Anaerobic Culture PARABACTEROIDES DISTASONIS  Few  Beta Lactamase positive      Blood culture [[id number]] Collected: 09/25/24 1111    Order Status: Completed Specimen: Blood from Peripheral, Antecubital, Right Updated: 09/30/24 1432     Blood Culture, Routine No growth after 5 days.    Narrative:      [id number]    Blood culture [[id number]] Collected: 09/25/24 1111    Order Status: Completed Specimen: Blood from Peripheral, Forearm, Right Updated: 09/30/24 1432     Blood Culture, Routine No growth after 5 days.    Narrative:      [id number]     Blood culture [[id number]] Collected: 09/24/24 1513    Order Status: Completed Specimen: Blood Updated: 09/29/24 1632     Blood Culture, Routine No growth after 5 days.    Narrative:      Collection has been rescheduled by [email or web address] at 09/23/2024 14:35 Reason:   Patient unavailable dialysis   Collection has been rescheduled by MYB at 09/23/2024 18:44 Reason:   Unable to collect  Collection has been rescheduled by CZB at 09/23/2024 19:02 Reason:   Unable to collect  Collection has been rescheduled by RE1 at 09/24/2024 09:43 Reason:   Spoke to the patient's nurse about unable to collect she is   contacting [facility] and the doctor  Collection has been rescheduled by [email or web address] at 09/23/2024 14:35 Reason:   Patient unavailable dialysis   Collection has been rescheduled by MYB at 09/23/2024 18:44 Reason:   Unable to collect  Collection has been rescheduled by CZB at 09/23/2024 19:02 Reason:   Unable to collect  Collection has been rescheduled by RE1 at 09/24/2024 09:43 Reason:   Spoke to the patient's nurse about unable to collect she is   contacting [facility] and the doctor    Blood culture [[id number]] Collected: 09/24/24 1122    Order Status: Completed Specimen: Blood Updated: 09/29/24 1232     Blood Culture, Routine No growth after 5 days.    Narrative:      Collection has been rescheduled by [email or web address] at 09/23/2024 14:35 Reason:   Patient unavailable dialysis   Collection has been rescheduled by MYB at 09/23/2024 18:44 Reason:   Unable to collect  Collection has been rescheduled by CZB at 09/23/2024 19:02 Reason:   Unable to collect  Collection has been rescheduled by RE1 at 09/24/2024 09:43 Reason:   Spoke to the patient's nurse about unable to collect she is   contacting [facility] and the doctor  Collection has been rescheduled by [email or web address] at 09/23/2024 14:35 Reason:   Patient unavailable dialysis   Collection has been rescheduled by MYB at 09/23/2024 18:44 Reason:   Unable to collect  Collection has been rescheduled by CZB at 09/23/2024 19:02  Reason:   Unable to collect  Collection has been rescheduled by RE1 at 09/24/2024 09:43 Reason:   Spoke to the patient's nurse about unable to collect she is   contacting [facility] and the doctor    AFB Culture & Smear [[id number]] Collected: 09/25/24 1413    Order Status: Completed Specimen: Incision site from Abdomen Updated: 09/28/24 1630     AFB CULTURE STAIN No acid fast bacilli seen.     AFB CULTURE STAIN Testing performed by:     AFB CULTURE STAIN Lab [facility] [place]     [facility] [address] [facility]     [place] [address]     AFB CULTURE STAIN Dr. Osbaldo Sherwood MD    IV catheter culture [[id number]] Collected: 09/25/24 1132    Order Status: Completed Specimen: Catheter Tip, Dialysis Updated: 09/28/24 1113     Aerobic Culture - Cath tip No growth    Narrative:      Trialysis    Aerobic culture [[id number]]  (Abnormal)  (Susceptibility) Collected: 09/24/24 1535    Order Status: Completed Specimen: Incision site from Groin Updated: 09/28/24 0738     Aerobic Bacterial Culture ESCHERICHIA COLI  From broth only      Narrative:      Left groin incision site drainage swab    Aerobic culture [[id number]]  (Abnormal)  (Susceptibility) Collected: 09/25/24 1413    Order Status: Completed Specimen: Incision site from Abdomen Updated: 09/28/24 0737     Aerobic Bacterial Culture ESCHERICHIA COLI  From broth only      Gram stain [[id number]] Collected: 09/25/24 1413    Order Status: Completed Specimen: Incision site from Abdomen Updated: 09/27/24 1530     Gram Stain Result Moderate WBC's      No organisms seen    Fungus culture [[id number]] Collected: 09/25/24 1413    Order Status: Sent Specimen: Incision site from Abdomen Updated: 09/25/24 1652            Significant Imaging: I have reviewed all pertinent imaging results/findings within the past 24 hours.

## 2024-10-02 NOTE — PT/OT/SLP PROGRESS
Occupational Therapy      Patient Name:  João Ham   MRN:  [id number]    Patient not seen today secondary to Dialysis.     10/2/2024

## 2024-10-02 NOTE — PROGRESS NOTES
INPATIENT NEPHROLOGY Progress Note  [facility] NEPHROLOGY    João Ham  10/02/2024    Reason for consultation:  ESRD    Chief Complaint:   Chief Complaint   Patient presents with    Fatigue    Groin Swelling     X 2 days.  Hx of Kidney failure and CHF     History of Present Illness:  Per H and P    Patient is a 44 year old male with history of ESDR on dialysis MWF, awaiting kidney transplant, HTN, presented to ED with 3 days history of left groin pain and swelling. States swelling comes every time he cough and is being painful. Patient has low grade fever 99s at home. He has been vomiting bilious fluid last 2-3 days. No diarrhea or abdominal pain.      In the ED CT abdomen showed Left inguinal hernia containing fat and air as well as marked inflammation extending from inside the pelvis, in the hernia and down into the left scrotum. This is consistent with an infected inguinal hernia, possible gangrene. WBC was 14, patient was tachycardic. General surgery was consulted and patient was admitted under hospitalist.     9/19  avf nonfunctional.   No sob or chest pain.  Has post op pain.  AFVSS  9/20  afvss.  Pt doesn't want to see previous vascular surgeon who put his avf in.  No alternative available.  Transfer center called.  Has temporary trialysis catheter.  Patient seen on hemodialysis for uremic clearance and ultrafiltration.    9/21  2.5L off w/HD yesterday.  Tmax 101.8, pressures ok.  Lab results reviewed, Hgb low but better than yesterday.  C/o post op pain, no other complaints.  9/22 POD 5.  VSS, lab results reviewed.  Hgb 7.6, added epo to HD orders.  C/o gas pains, plans to move around more today.  Next HD tomorrow.  9/23 VSS. HD today. Transfuse with next HD as needed if Hgb stil low.  9/24 VSS. Appreciate input from Urology, Gen Surgery, Vascular Surgery, ID on this complex patient. Plan for fistulogram tomorrow. CXR yesterday shows trialysis line tip in appropriate cocation. Will attempt HD today since  we skipped yesterday due to nurse and patient concerns that the line may be malpositioned.  9/25 VSS. s/p cephalic vein dilation by Dr. Robledo yesterday, tolerated HD very well. Hold HD today. Hypotension this AM, low grade fever yesterday, WBC elevated, received IVF bolus and Midodrine. Continues to be infected per Surgery, we can remove trialysis line now that AVF is treated and usable... Consider ICU. Stopped hydralazine and olmesartan, decreased Clonidine to 0.1 BID, not sure what to do with Metoprolol 150mg and Hydralazine 120mg... He may be sob/hypoxic due to anemia, suggest 1 PRBC instead of IVF, since Hgb is 7.    Addendum @ 10:47 AM  Seen at bedside in ICU with Dr. Rodriguez and Dr. uA. Mother present as well. Reviewed imaging. Agree with plan for urgent ex lap, remove central line and place mid line, keep current abx, hold on transfusion and dialysis and reassess later. ABG and fresh set of cx now, re-evalaute later.    Addendum @ 4:36 PM  Discussed briefly with Dr. Connors, dialysis nurse Brant and with ICU nurse. Patient had colostomy placed due to anastomotic leak and had 2 PRBC given. Upon return to floor was hypertensive with SBP > 200 and NPO. Advised Cleviprex drip for now (earlier to day he was on max dose Clonidine, mad dose Hydralazine, max dose Olmesartan, 150mg Metoprolol-XL and 120mg of Imdur - none of which he can get due to NPO) and will do urgent HD with UF 2L or so as tolerated.  9/26  POD 1 s/p . Exploratory laparotomy,. Colon resection end-colostomy, Mobilization of splenic flexure, and left inguinal canal exploration.  Post pain. No sob.  Sleepy.    9/27  AFVSS.  Some post op pain.  No sob.  No angina.   Patient seen on hemodialysis for uremic clearance and ultrafiltration.    9/28 s/p 3.8 liter uf yesterday.     AFVSS.  Post op pain.  Dry cough.  No sob.  Projectile vomiting earlier today per patient  9/30 VSS. HD today.  10/1 GS note reviewed, working on pain control, tolerating  diet  10/2 no major events overnight- due for HD today    Plan of Care:    ESRD on HD MWF  --continue dialysis per routine    Anemia of CKD  --continue LOR with HD  --transfused this admission    Secondary HPT  --renal diet  --continue binders with meals    Hypertension  --continue current BP meds  --uf with hd as needed    Hyponatremia  Hyperkalemia  Acidosis  --adjust dialysate  --renal diet    AVF malfunction fixed  --appreciate Vascular eval, s/p fistulogram and cephalic vein stenosis dilation on 9/23 by Dr. Salvador.    Inguinal hernia of left side with gangrene - W/ Colonic perforation with abscess   --S/P Robotic sigmoid resection, Mobilization of splenic flexure, left inguinal canal exploration, and I&D 9/17 per Dr. Connors   --dose antbx for CrCl < 10/HD    Thank you for allowing us to participate in this patient's care. We will continue to follow.    Vital Signs:  Temp Readings from Last 3 Encounters:   10/02/24 98.2 °F (36.8 °C) (Oral)   08/22/24 97.3 °F (36.3 °C) (Temporal)   07/30/24 98.6 °F (37 °C) (Oral)       Pulse Readings from Last 3 Encounters:   10/02/24 95   08/29/24 (!) 115   08/22/24 110       BP Readings from Last 3 Encounters:   10/02/24 (!) 142/74   08/29/24 99/68   08/22/24 (!) 137/91       Weight:  Wt Readings from Last 3 Encounters:   09/18/24 134 kg (295 lb 6.7 oz)   08/29/24 131.3 kg (289 lb 7.4 oz)   08/22/24 131 kg (288 lb 12.8 oz)       Medications:  No current facility-administered medications on file prior to encounter.     Current Outpatient Medications on File Prior to Encounter   Medication Sig Dispense Refill    apixaban (ELIQUIS) 2.5 mg Tab Take 2.5 mg by mouth 2 (two) times daily.      calcitRIOL (ROCALTROL) 0.25 MCG Cap Take 1 capsule by mouth once daily (Patient taking differently: Take 0.25 mcg by mouth once daily.) 90 capsule 1    cholecalciferol, vitamin D3, 125 mcg (5,000 unit) Tab Take 1 tablet by mouth once daily.      cloNIDine (CATAPRES) 0.3 MG tablet TAKE 1 TABLET  BY MOUTH THREE TIMES DAILY 270 tablet 2    isosorbide mononitrate (IMDUR) 120 MG 24 hr tablet Take 120 mg by mouth once daily.      metoprolol succinate (TOPROL-XL) 50 MG 24 hr tablet Take 150 mg by mouth once daily.      olmesartan (BENICAR) 40 MG tablet Take 1 tablet by mouth once daily 90 tablet 0    sevelamer carbonate (RENVELA) 800 mg Tab Take 2 tablets (1,600 mg total) by mouth 3 (three) times daily with meals. 180 tablet 11    calcium carbonate (TUMS) 200 mg calcium (500 mg) chewable tablet Take 2 tablets (1,000 mg total) by mouth 3 (three) times daily with meals. (Patient taking differently: Take 1,000 mg by mouth 3 (three) times daily.) 180 tablet 11    hydrALAZINE (APRESOLINE) 100 MG tablet Take 1 tablet (100 mg total) by mouth 3 (three) times daily. 90 tablet 11     Scheduled Meds:   cloNIDine  0.3 mg Oral TID    epoetin mily-epbx  10,000 Units Intravenous Every Mon, Wed, Fri    fluconazole (DIFLUCAN) IV (PEDS and ADULTS)  200 mg Intravenous Q24H    gabapentin  100 mg Oral TID    hydrALAZINE  100 mg Oral Q8H    HYDROmorphone  1 mg Intravenous Once    isosorbide mononitrate  30 mg Oral Daily    LIDOcaine-prilocaine   Topical (Top) Once    linezolid  600 mg Intravenous Q12H    losartan  100 mg Oral Daily    metoprolol succinate  150 mg Oral Daily    oxymetazoline  2 spray Each Nostril BID    piperacillin-tazobactam (Zosyn) IV (PEDS and ADULTS) (extended infusion is not appropriate)  4.5 g Intravenous Q12H     Continuous Infusions:        PRN Meds:.  Current Facility-Administered Medications:     0.9% NaCl, , Intravenous, PRN    acetaminophen, 650 mg, Oral, Q4H PRN    ALPRAZolam, 0.25 mg, Oral, Nightly PRN    aluminum-magnesium hydroxide-simethicone, 30 mL, Oral, QID PRN    dextrose 10%, 12.5 g, Intravenous, PRN    dextrose 10%, 12.5 g, Intravenous, PRN    dextrose 10%, 12.5 g, Intravenous, PRN    dextrose 10%, 25 g, Intravenous, PRN    dextrose 10%, 25 g, Intravenous, PRN    dextrose 10%, 25 g, Intravenous,  "PRN    glucagon (human recombinant), 1 mg, Intramuscular, PRN    glucose, 16 g, Oral, PRN    glucose, 24 g, Oral, PRN    heparin (porcine), 4,000 Units, Intravenous, PRN    HYDROmorphone, 0.5 mg, Intravenous, Q12H PRN    insulin aspart U-100, 0-5 Units, Subcutaneous, QID (AC + HS) PRN    iohexoL, 100 mL, Intravenous, ONCE PRN    LIDOcaine-prilocaine, , Topical (Top), PRN    magnesium oxide, 800 mg, Oral, PRN    magnesium oxide, 800 mg, Oral, PRN    melatonin, 6 mg, Oral, Nightly PRN    naloxone, 0.02 mg, Intravenous, PRN    ondansetron, 4 mg, Intravenous, Q6H PRN    oxyCODONE, 10 mg, Oral, Q6H PRN    oxyCODONE-acetaminophen, 1 tablet, Oral, Q4H PRN    potassium bicarbonate, 35 mEq, Oral, PRN    potassium bicarbonate, 50 mEq, Oral, PRN    potassium bicarbonate, 60 mEq, Oral, PRN    potassium, sodium phosphates, 2 packet, Oral, PRN    potassium, sodium phosphates, 2 packet, Oral, PRN    potassium, sodium phosphates, 2 packet, Oral, PRN    promethazine (PHENERGAN) 12.5 mg in 0.9% NaCl 50 mL IVPB, 12.5 mg, Intravenous, Q6H PRN    sodium chloride 0.9%, 250 mL, Intravenous, PRN    sodium chloride 0.9%, 3 mL, Intravenous, Q12H PRN    Review of Systems:  Neg    Physical Exam:    BP (!) 142/74   Pulse 95   Temp 98.2 °F (36.8 °C) (Oral)   Resp 16   Ht 6' 2" (1.88 m)   Wt 134 kg (295 lb 6.7 oz)   SpO2 95%   BMI 37.93 kg/m²     General Appearance:    Alert, cooperative, no distress, appears stated age   Head:    Normocephalic, without obvious abnormality, atraumatic   Eyes:    PER, conjunctiva/corneas clear, EOM's intact in both eyes        Throat:   Lips, mucosa, and tongue normal; teeth and gums normal   Back:     Symmetric, no curvature, ROM normal, no CVA tenderness   Lungs:     Clear to auscultation bilaterally, respirations unlabored   Chest wall:    No tenderness or deformity   Heart:    Regular rate and rhythm, S1 and S2 normal, no murmur, rub   or gallop   Abdomen:     Soft, non-tender, bowel sounds active all " four quadrants,     no masses, no organomegaly   Extremities:   Extremities normal, atraumatic, no cyanosis or edema   Pulses:   2+ and symmetric all extremities   MSK:   No joint or muscle swelling, tenderness or deformity   Skin:   Skin color, texture, turgor normal, no rashes or lesions   Neurologic:   CNII-XII intact, normal strength and sensation       Throughout.  No flap     Results:  Recent Labs   Lab 09/30/24  0627 10/01/24  0546 10/02/24  0558   * 136 133*   K 4.0 4.7 4.3   CL 94* 96 94*   CO2 22* 23 19*   BUN 63* 44* 53*   CREATININE 15.5* 13.0* 15.2*    109 114*       Recent Labs   Lab 09/30/24  0627 10/01/24  0546 10/02/24  0558   CALCIUM 8.7 9.4 9.3   ALBUMIN 3.0* 3.4* 3.3*   MG 2.5 2.5 2.4       Recent Labs   Lab 02/02/23  0745 05/19/23  1022 06/19/23  1031   PTH, Intact 225.6 H 335.8 H 319.0 H       Recent Labs   Lab 09/29/24  0750 09/29/24  1104 09/29/24  1550 09/29/24  2031 09/30/24  0748 10/01/24  0741 10/01/24  1154 10/01/24  1638 10/01/24  2007 10/02/24  0719   POCTGLUCOSE 120* 151* 102 142* 111* 124* 166* 132* 138* 126*       Recent Labs   Lab 10/10/22  2005 06/22/23  0446 07/16/24  0510   Hemoglobin A1C 5.5 5.5 6.1       Recent Labs   Lab 09/30/24  0627 10/01/24  0546 10/02/24  0558   WBC 15.43* 15.59* 15.46*   HGB 7.4* 8.3* 8.1*   HCT 24.3* 28.1* 26.9*   * 505* 497*   MCV 97 99* 99*   MCHC 30.5* 29.5* 30.1*   MONO 11.0 15.0 17.0*  CANCELED   EOSINOPHIL 2.0 0.0 0.0       Recent Labs   Lab 09/30/24  0627 10/01/24  0546 10/02/24  0558   BILITOT 0.4 0.4 0.4   PROT 7.8 8.6* 8.4   ALBUMIN 3.0* 3.4* 3.3*   ALKPHOS 114 117 110   ALT 38 36 32   AST 43* 34 31       Recent Labs   Lab 10/10/22  1645 01/09/23  1123 06/19/23  1622 07/17/24  0505 09/19/24  1435   Color, UA Yellow   < > Straw Yellow Yellow   Appearance, UA Clear   < > Clear Hazy A Clear   pH, UA 6.0   < > 6.0 6.0 8.0   Specific Gravity, UA 1.025   < > 1.010 1.020 1.010   Protein, UA 2+ A   < > 2+ A 3+ A 2+ A   Glucose,  UA Negative   < > Negative Negative Negative   Ketones, UA Negative   < > Negative Negative Negative   Urobilinogen, UA Negative  --   --  Negative Negative   Bilirubin (UA) Negative   < > Negative Negative Negative   Occult Blood UA 1+ A   < > Negative 2+ A 2+ A   Nitrite, UA Negative   < > Negative Negative Negative   RBC, UA 1   < > 0 6 H 4   WBC, UA 0   < > 2 52 H 21 H   Bacteria None   < > None None None   Hyaline Casts, UA 0   < > 0 0 0    < > = values in this interval not displayed.       Recent Labs   Lab 07/15/24  1824 09/17/24  0928 09/25/24  1051   POC PH  --   --  7.404   POC PCO2  --   --  37.7   POC HCO3  --   --  23.6 L   POC PO2  --   --  87   POC SATURATED O2  --   --  97   POC BE  --   --  -1   Sample VENOUS VENOUS ARTERIAL       Microbiology Results (last 7 days)       Procedure Component Value Units Date/Time    Culture, Anaerobe [[id number]]  (Abnormal) Collected: 09/25/24 1413    Order Status: Completed Specimen: Incision site from Abdomen Updated: 10/01/24 1418     Anaerobic Culture PARABACTEROIDES DISTASONIS  Few  Beta Lactamase positive      Blood culture [[id number]] Collected: 09/25/24 1111    Order Status: Completed Specimen: Blood from Peripheral, Antecubital, Right Updated: 09/30/24 1432     Blood Culture, Routine No growth after 5 days.    Narrative:      [id number]    Blood culture [[id number]] Collected: 09/25/24 1111    Order Status: Completed Specimen: Blood from Peripheral, Forearm, Right Updated: 09/30/24 1432     Blood Culture, Routine No growth after 5 days.    Narrative:      [id number]    Blood culture [[id number]] Collected: 09/24/24 1513    Order Status: Completed Specimen: Blood Updated: 09/29/24 1632     Blood Culture, Routine No growth after 5 days.    Narrative:      Collection has been rescheduled by [email or web address] at 09/23/2024 14:35 Reason:   Patient unavailable dialysis   Collection has been rescheduled by MYB at 09/23/2024 18:44 Reason:   Unable to collect  Collection has been  rescheduled by CZB at 09/23/2024 19:02 Reason:   Unable to collect  Collection has been rescheduled by RE1 at 09/24/2024 09:43 Reason:   Spoke to the patient's nurse about unable to collect she is   contacting Lambert and the doctor  Collection has been rescheduled by [email or web address] at 09/23/2024 14:35 Reason:   Patient unavailable dialysis   Collection has been rescheduled by MYB at 09/23/2024 18:44 Reason:   Unable to collect  Collection has been rescheduled by CZB at 09/23/2024 19:02 Reason:   Unable to collect  Collection has been rescheduled by RE1 at 09/24/2024 09:43 Reason:   Spoke to the patient's nurse about unable to collect she is   contacting Lambert and the doctor    Blood culture [[id number]] Collected: 09/24/24 1122    Order Status: Completed Specimen: Blood Updated: 09/29/24 1232     Blood Culture, Routine No growth after 5 days.    Narrative:      Collection has been rescheduled by [email or web address] at 09/23/2024 14:35 Reason:   Patient unavailable dialysis   Collection has been rescheduled by MYB at 09/23/2024 18:44 Reason:   Unable to collect  Collection has been rescheduled by CZB at 09/23/2024 19:02 Reason:   Unable to collect  Collection has been rescheduled by RE1 at 09/24/2024 09:43 Reason:   Spoke to the patient's nurse about unable to collect she is   contacting Lambert and the doctor  Collection has been rescheduled by [email or web address] at 09/23/2024 14:35 Reason:   Patient unavailable dialysis   Collection has been rescheduled by MYB at 09/23/2024 18:44 Reason:   Unable to collect  Collection has been rescheduled by CZB at 09/23/2024 19:02 Reason:   Unable to collect  Collection has been rescheduled by RE1 at 09/24/2024 09:43 Reason:   Spoke to the patient's nurse about unable to collect she is   contacting Lambert and the doctor    AFB Culture & Smear [[id number]] Collected: 09/25/24 1413    Order Status: Completed Specimen: Incision site from Abdomen Updated: 09/28/24 1630     AFB CULTURE STAIN No acid fast bacilli seen.     AFB  CULTURE STAIN Testing performed by:     AFB CULTURE STAIN Lab [facility] [place]     [facility] [address] [facility]     [place] [address]     AFB CULTURE STAIN Dr. Osbaldo Sherwood MD    IV catheter culture [[id number]] Collected: 09/25/24 1132    Order Status: Completed Specimen: Catheter Tip, Dialysis Updated: 09/28/24 1113     Aerobic Culture - Cath tip No growth    Narrative:      Trialysis    Aerobic culture [[id number]]  (Abnormal)  (Susceptibility) Collected: 09/24/24 1535    Order Status: Completed Specimen: Incision site from Groin Updated: 09/28/24 0738     Aerobic Bacterial Culture ESCHERICHIA COLI  From broth only      Narrative:      Left groin incision site drainage swab    Aerobic culture [[id number]]  (Abnormal)  (Susceptibility) Collected: 09/25/24 1413    Order Status: Completed Specimen: Incision site from Abdomen Updated: 09/28/24 0737     Aerobic Bacterial Culture ESCHERICHIA COLI  From broth only      Gram stain [[id number]] Collected: 09/25/24 1413    Order Status: Completed Specimen: Incision site from Abdomen Updated: 09/27/24 1530     Gram Stain Result Moderate WBC's      No organisms seen    Fungus culture [[id number]] Collected: 09/25/24 1413    Order Status: Sent Specimen: Incision site from Abdomen Updated: 09/25/24 1652          Patient care time was spent personally by me on the following activities: > 35 minutes  Obtaining a history.  Examination of patient.  Providing medical care at the patients bedside.  Developing a treatment plan with patient or surrogate and bedside caregivers.  Ordering and reviewing laboratory studies, radiographic studies, pulse oximetry.  Ordering and performing treatments and interventions.  Evaluation of patient's response to treatment.  Discussions with consultants while on the unit and immediately available to the patient.  Re-evaluation of the patient's condition.  Documentation in the medical record.      Oni Garcia  MD    [facility] [place]  [phone]

## 2024-10-02 NOTE — PROGRESS NOTES
S/p Jackelin's  Doing well.  Ostomy functioning  Penrose removed  Stu will remain in place another week    Wt Readings from Last 3 Encounters:   09/18/24 134 kg (295 lb 6.7 oz)   08/29/24 131.3 kg (289 lb 7.4 oz)   08/22/24 131 kg (288 lb 12.8 oz)     Temp Readings from Last 3 Encounters:   10/02/24 98.2 °F (36.8 °C) (Oral)   08/22/24 97.3 °F (36.3 °C) (Temporal)   07/30/24 98.6 °F (37 °C) (Oral)     BP Readings from Last 3 Encounters:   10/02/24 (!) 142/74   08/29/24 99/68   08/22/24 (!) 137/91     Pulse Readings from Last 3 Encounters:   10/02/24 95   08/29/24 (!) 115   08/22/24 110     AAOx3  Soft/nd/nt  No resp distress    Lab Results   Component Value Date    WBC 15.46 (H) 10/02/2024    HGB 8.1 (L) 10/02/2024    HCT 26.9 (L) 10/02/2024    MCV 99 (H) 10/02/2024     (H) 10/02/2024       BMP  Lab Results   Component Value Date     (L) 10/02/2024    K 4.3 10/02/2024    CL 94 (L) 10/02/2024    CO2 19 (L) 10/02/2024    BUN 53 (H) 10/02/2024    CREATININE 15.2 (H) 10/02/2024    CALCIUM 9.3 10/02/2024    ANIONGAP 20 (H) 10/02/2024    EGFRNORACEVR 3.7 (A) 10/02/2024     A/P: s/p Jackelin's  Ambulate  Aggressive IS  Ok to d/c when cleared by hospitalist

## 2024-10-02 NOTE — CARE UPDATE
10/01/24 2015   Patient Assessment/Suction   Level of Consciousness (AVPU) alert   Respiratory Effort Normal;Unlabored   Expansion/Accessory Muscles/Retractions no use of accessory muscles;expansion symmetric;no retractions   All Lung Fields Breath Sounds diminished   Rhythm/Pattern, Respiratory unlabored;pattern regular   Cough Frequency no cough   PRE-TX-O2   Device (Oxygen Therapy) room air   SpO2 (!) 94 %   Pulse Oximetry Type Intermittent   Incentive Spirometer   $ Incentive Spirometer Charges done with encouragement   Incentive Spirometer Predicted Level (mL) 2000   Administration (IS) instruction provided, follow-up;mouthpiece utilized   Number of Repetitions (IS) 10   Level Incentive Spirometer (mL) 3000   Patient Tolerance (IS) no adverse signs/symptoms present

## 2024-10-02 NOTE — PROGRESS NOTES
HD tx tolerated well    Net UF 3.8 L  Pt educated on access maintenance    Pt stable, denies complaints     10/02/24 1430   Handoff Report   Received From [facility]   Given To [facility]   Vital Signs   Temp 98.1 °F (36.7 °C)   Temp Source Oral   Pulse (!) 120   Resp 20   SpO2 95 %   Pulse Oximetry Type Intermittent   Probe Placed On (Pulse Ox) Right:;finger   /76   BP Location Right leg   BP Method Automatic   Patient Position Lying   Assessments (Pre/Post)   Blood Liters Processed (BLP) 61.7   Transport Modality bed   Level of Consciousness (AVPU) alert   Dialyzer Clearance moderately streaked        Hemodialysis AV Fistula Left forearm   No placement date or time found.   Present Prior to Hospital Arrival?: Yes  Size/Length: 17 G  Location: Left forearm   Site Assessment Clean;Dry;Intact   Patency Present;Thrill;Bruit   Status Deaccessed   Flows Good   Dressing Intervention First dressing   Dressing Status Clean;Dry;Intact   Site Condition No complications   Dressing Gauze   Post-Hemodialysis Assessment   Rinseback Volume (mL) 250 mL   Blood Volume Processed (Liters) 61.7 L   Dialyzer Clearance Moderately streaked   Duration of Treatment 180 minutes   Additional Fluid Intake (mL) 500 mL   Total UF (mL) 4300 mL   Net Fluid Removal 3800   Patient Response to Treatment Tolerated well   Arterial bleeding stop time (min) 6 min   Venous bleeding stop time (min) 6 min   Post-Hemodialysis Comments Tx complete, pt stable

## 2024-10-02 NOTE — ASSESSMENT & PLAN NOTE
W/ Colonic perforation with abscess  CT abdomen shows Left inguinal hernia containing fat and air as well as marked inflammation extending from inside the pelvis, in the hernia and down into the left scrotum.   S/P Robotic sigmoid resection, Mobilization of splenic flexure, left inguinal canal exploration, and I&D 9/17 per Dr. Connors with repeat OR visit for anastomotic leak on 9/25  Continue regular diet:  Started 9/30  Pain uncontrolled:  Oxycodone has not been effective.  We will adjust pain regimen to p.o. Dilaudid

## 2024-10-02 NOTE — PROGRESS NOTES
[facility] Medicine  Progress Note    Patient Name: João Ham  MRN: [id number]  Patient Class: IP- Inpatient   Admission Date: 9/17/2024  Length of Stay: 15 days  Attending Physician: Ag Reyes, *  Primary Care Provider: Dawson Granado MD        Subjective:     Principal Problem:Inguinal hernia of left side with gangrene        HPI:  Patient is a 44 year old male with history of ESDR on dialysis MWF, awaiting kidney transplant, HTN, presented to ED with 3 days history of left groin pain and swelling. States swelling comes every time he cough and is being painful. Patient has low grade fever 99s at home. He has been vomiting bilious fluid last 2-3 days. No diarrhea or abdominal pain.     In the ED CT abdomen showed Left inguinal hernia containing fat and air as well as marked inflammation extending from inside the pelvis, in the hernia and down into the left scrotum. This is consistent with an infected inguinal hernia, possible gangrene. WBC was 14, patient was tachycardic. General surgery was consulted and patient was admitted under hospitalist.     Overview/Hospital Course:  Patient is a 44 year old male with history of ESRD, was admitted with suspected inguinal hernia gangrene. General surgery was consulted and patient was taken to OR. Found to have colonic perforation due to mesh erosion with an abscess. Patient underwent sigmoid resection and drainage of the abscess, was started on clindamycin, Vancomycin and cefepime. Nephrology was consulted for chronic dialysis. AVF was not functioning, General surgery consulted, trialysis catheter placed. PRBC transfused during dialysis for low Hb 6.6.  While on broad-spectrum coverage, patient is spiking fevers and worsening leukocytosis, id consulted, discontinued clindamycin, vancomycin and cefepime-added daptomycin, Diflucan and Zosyn.  Repeated CT abdomen and pelvis that showed residual abscess/phlegmon and contained  perforation. Re-consulted surgery who mentioned likely post op changes. Fistulogram 9/24 by vascular surgery for declotting the AV fistula. Trialysis removed 9/25. Had worsening white count, fevers upto 103.5 and hypotension overnight 9/25.  Also had hemoglobin of 7, with worsening hypoxia up to 7 L (overnight desaturated to 70% with lethargy requiring BiPAP placement), after which nephrology recommended 1 unit PRBC transfusion.  Id, Nephrology, Urology, General surgery were updated about his worsening condition.We CT repeated that showed intraperitoneal free air and findings suggestive of necrotizing fasciitis, general surgery was consulted stat, patient was transferred to ICU.  The surgeon performed open laparotomy and noted fecal contamination of the left lower quadrant indicative of anastomotic disruption.  Colon was resected, and colostomy was created. Patient was placed on full liquid diet, was unable to tolerate and downgraded back to clear liquid diet.  Also complaining of excess mucus and cough.  Patient encouraged to use incentive spirometer as directed, and press pillow to abdomen when coughing.  Pulmonology signed off on 09/29 with recommendation to continue BiPAP nightly and with naps.  PT/OT recommended high-intensity therapy due to patient's deconditioning and discharge planning was started.  He continued to require pain management with oral and IV narcotic pain medication, with weaning down the IV Dilaudid as oral opiate medications were titrated.    Interval History: Patient seen and examined reports increased abdominal pain-has been trying to avoid the IV Dilaudid and stick with the oxycodone-does not feel it is working.  No chest pain or shortness a breath.    Wound care nurse at bedside.  Reports ID physician was able to express some pus superficially in the groin incision, she then did her wound care with no further findings of purulent drainage.    He has been accepted to [facility].    I initially saw the Pt at 10:00 a.m., but I am doing my note later in the day and note that he is tachycardic up to 129-upon review of the ori, he has not received his metoprolol in several days-a dose was just given at time of my note writing.    Review of Systems   Constitutional:  Negative for chills and fatigue.   Respiratory:  Negative for cough and shortness of breath.    Cardiovascular:  Negative for chest pain and leg swelling.   Gastrointestinal:  Positive for abdominal pain. Negative for nausea and vomiting.     Objective:     Vital Signs (Most Recent):  Temp: 98.1 °F (36.7 °C) (10/02/24 1430)  Pulse: (!) 129 (10/02/24 1526)  Resp: 20 (10/02/24 1430)  BP: 123/89 (10/02/24 1526)  SpO2: 95 % (10/02/24 1430) Vital Signs (24h Range):  Temp:  [98.1 °F (36.7 °C)-100.1 °F (37.8 °C)] 98.1 °F (36.7 °C)  Pulse:  [] 129  Resp:  [16-20] 20  SpO2:  [94 %-96 %] 95 %  BP: (108-192)/() 123/89     Weight: 134 kg (295 lb 6.7 oz)  Body mass index is 37.93 kg/m².    Intake/Output Summary (Last 24 hours) at 10/2/2024 1534  Last data filed at 10/2/2024 1430  Gross per 24 hour   Intake 1330 ml   Output 4715 ml   Net -3385 ml         Physical Exam  Vitals and nursing note reviewed.   Constitutional:       Appearance: Normal appearance. He is obese.   HENT:      Head: Normocephalic and atraumatic.   Eyes:      Conjunctiva/sclera: Conjunctivae normal.      Pupils: Pupils are equal, round, and reactive to light.   Cardiovascular:      Rate and Rhythm: Normal rate and regular rhythm.   Pulmonary:      Effort: Pulmonary effort is normal.      Breath sounds: Normal breath sounds.   Abdominal:      General: Abdomen is flat. Bowel sounds are normal. There is no distension.      Palpations: Abdomen is soft.      Tenderness: There is abdominal tenderness.      Comments: Dressings CDI   Skin:     General: Skin is warm and dry.      Capillary Refill: Capillary refill takes less than 2 seconds.   Neurological:       General: No focal deficit present.      Mental Status: He is alert and oriented to person, place, and time. Mental status is at baseline.   Psychiatric:         Mood and Affect: Mood normal.             Significant Labs: All pertinent labs within the past 24 hours have been reviewed.  CBC:   Recent Labs   Lab 10/01/24  0546 10/02/24  0558   WBC 15.59* 15.46*   HGB 8.3* 8.1*   HCT 28.1* 26.9*   * 497*     CMP:   Recent Labs   Lab 10/01/24  0546 10/02/24  0558    133*   K 4.7 4.3   CL 96 94*   CO2 23 19*    114*   BUN 44* 53*   CREATININE 13.0* 15.2*   CALCIUM 9.4 9.3   PROT 8.6* 8.4   ALBUMIN 3.4* 3.3*   BILITOT 0.4 0.4   ALKPHOS 117 110   AST 34 31   ALT 36 32   ANIONGAP 17* 20*     Microbiology Results (last 7 days)       Procedure Component Value Units Date/Time    Culture, Anaerobe [[id number]]  (Abnormal) Collected: 09/25/24 1413    Order Status: Completed Specimen: Incision site from Abdomen Updated: 10/01/24 1418     Anaerobic Culture PARABACTEROIDES DISTASONIS  Few  Beta Lactamase positive      Blood culture [[id number]] Collected: 09/25/24 1111    Order Status: Completed Specimen: Blood from Peripheral, Antecubital, Right Updated: 09/30/24 1432     Blood Culture, Routine No growth after 5 days.    Narrative:      [id number]    Blood culture [[id number]] Collected: 09/25/24 1111    Order Status: Completed Specimen: Blood from Peripheral, Forearm, Right Updated: 09/30/24 1432     Blood Culture, Routine No growth after 5 days.    Narrative:      [id number]    Blood culture [[id number]] Collected: 09/24/24 1513    Order Status: Completed Specimen: Blood Updated: 09/29/24 1632     Blood Culture, Routine No growth after 5 days.    Narrative:      Collection has been rescheduled by [email or web address] at 09/23/2024 14:35 Reason:   Patient unavailable dialysis   Collection has been rescheduled by MYB at 09/23/2024 18:44 Reason:   Unable to collect  Collection has been rescheduled by CZB at 09/23/2024 19:02 Reason:    Unable to collect  Collection has been rescheduled by RE1 at 09/24/2024 09:43 Reason:   Spoke to the patient's nurse about unable to collect she is   contacting [facility] and the doctor  Collection has been rescheduled by [email or web address] at 09/23/2024 14:35 Reason:   Patient unavailable dialysis   Collection has been rescheduled by MYB at 09/23/2024 18:44 Reason:   Unable to collect  Collection has been rescheduled by CZB at 09/23/2024 19:02 Reason:   Unable to collect  Collection has been rescheduled by RE1 at 09/24/2024 09:43 Reason:   Spoke to the patient's nurse about unable to collect she is   contacting [facility] and the doctor    Blood culture [[id number]] Collected: 09/24/24 1122    Order Status: Completed Specimen: Blood Updated: 09/29/24 1232     Blood Culture, Routine No growth after 5 days.    Narrative:      Collection has been rescheduled by [email or web address] at 09/23/2024 14:35 Reason:   Patient unavailable dialysis   Collection has been rescheduled by MYB at 09/23/2024 18:44 Reason:   Unable to collect  Collection has been rescheduled by CZB at 09/23/2024 19:02 Reason:   Unable to collect  Collection has been rescheduled by RE1 at 09/24/2024 09:43 Reason:   Spoke to the patient's nurse about unable to collect she is   contacting [facility] and the doctor  Collection has been rescheduled by [email or web address] at 09/23/2024 14:35 Reason:   Patient unavailable dialysis   Collection has been rescheduled by MYB at 09/23/2024 18:44 Reason:   Unable to collect  Collection has been rescheduled by CZB at 09/23/2024 19:02 Reason:   Unable to collect  Collection has been rescheduled by RE1 at 09/24/2024 09:43 Reason:   Spoke to the patient's nurse about unable to collect she is   contacting [facility] and the doctor    AFB Culture & Smear [[id number]] Collected: 09/25/24 1413    Order Status: Completed Specimen: Incision site from Abdomen Updated: 09/28/24 1630     AFB CULTURE STAIN No acid fast bacilli seen.     AFB CULTURE STAIN Testing performed by:     AFB CULTURE  STAIN Lab [facility] [place]     [facility] [facility]     [place] [id number]     AFB CULTURE STAIN Dr. Osbaldo Sherwood MD    IV catheter culture [[id number]] Collected: 09/25/24 1132    Order Status: Completed Specimen: Catheter Tip, Dialysis Updated: 09/28/24 1113     Aerobic Culture - Cath tip No growth    Narrative:      Trialysis    Aerobic culture [[id number]]  (Abnormal)  (Susceptibility) Collected: 09/24/24 1535    Order Status: Completed Specimen: Incision site from Groin Updated: 09/28/24 0738     Aerobic Bacterial Culture ESCHERICHIA COLI  From broth only      Narrative:      Left groin incision site drainage swab    Aerobic culture [[id number]]  (Abnormal)  (Susceptibility) Collected: 09/25/24 1413    Order Status: Completed Specimen: Incision site from Abdomen Updated: 09/28/24 0737     Aerobic Bacterial Culture ESCHERICHIA COLI  From broth only      Gram stain [[id number]] Collected: 09/25/24 1413    Order Status: Completed Specimen: Incision site from Abdomen Updated: 09/27/24 1530     Gram Stain Result Moderate WBC's      No organisms seen    Fungus culture [[id number]] Collected: 09/25/24 1413    Order Status: Sent Specimen: Incision site from Abdomen Updated: 09/25/24 1652            Significant Imaging: I have reviewed all pertinent imaging results/findings within the past 24 hours.    Assessment/Plan:      * Inguinal hernia of left side with gangrene  W/ Colonic perforation with abscess  CT abdomen shows Left inguinal hernia containing fat and air as well as marked inflammation extending from inside the pelvis, in the hernia and down into the left scrotum.   S/P Robotic sigmoid resection, Mobilization of splenic flexure, left inguinal canal exploration, and I&D 9/17 per Dr. Connors with repeat OR visit for anastomotic leak on 9/25  Continue regular diet:  Started 9/30  Pain uncontrolled:  Oxycodone has not been effective.  We will adjust pain regimen to p.o.  Dilaudid    Bleeding from the nose  -humidify the bipap  -Afrin BID  -Not acutely bleeding on my exam today  -Heparin SQ was held: will follow and resume if clinically appropriate.      Insomnia  Was started on  Xanax 0.25 mg q.h.s. PRN insomnia      Debility  Patient with Acute debility due to bed confinement status and prolonged hospitalization, and infection . Latest AMPAC and GEMS scores have not been reviewed. Plan includes progressive mobility protocol initated, PT/OT consulted, fall precautions in place, and CM following for discharge planning .  [facility]  Awaiting medical stability to be discharged    Orchitis  Urology consulted: no surgical/invasive intervention recommended  S/P left inguinal canal exploration with Penrose drain placed in the inguinal canal 9/25 per general surgeon.  Continue antibiotics per Infectious Disease    Sepsis  This patient does have evidence of infective focus  My overall impression is sepsis.  Source: Abdominal  Antibiotics given-   Antibiotics (72h ago, onward)      Start     Stop Route Frequency Ordered    09/27/24 1015  linezolid 600 mg/300 mL IVPB 600 mg         -- IV Every 12 hours (non-standard times) 09/27/24 0909    09/24/24 1800  piperacillin-tazobactam 4.5 g in dextrose 5 % 100 mL IVPB (ready to mix)         -- IV Every 12 hours (non-standard times) 09/24/24 1035        fluconazole (DIFLUCAN) IVPB 200 mg  Start Date/Time (Original Order): 09/22/24 1500   Ordered Dose: 200 mg Route: Intravenous Frequency: Every 24 hours (non-standard times)       Source control achieved by: IV antibiotics and surgical intervention  =======================================================  Blood culture 09/07/2024: NGTD   Urine culture 09/19/2024: NGTD   Blood culture 09/24/2024:  NGTD   Left groin drainage culture 09/24/2024:  E coli only resistant to tetracycline, cefazolin, ampicillin and ceftriaxone  Abdomen incision site culture 09/24/2024:  E coli only sensitive  "to ceftriaxone and tetracycline  =====================================================  WBC continues trending down   Infectious disease following   Continue current antibiotic regimen    Acute hypoxic respiratory failure  Patient with Hypoxic Respiratory failure which is Acute.  he is not on home oxygen. Supplemental oxygen was provided and noted- Oxygen Concentration (%):  [28] 28  Continue nightly BiPAP and supplemental O2, weaning as tolerated.  Pulmonology signed off 9/29    ABG  No results for input(s): "PH", "PO2", "PCO2", "HCO3", "BE" in the last 168 hours.  =================================  Had issues with some nasal bleeding/clotting.  -Bipap humidification    Perforation of intestine  S/p open laparotomy with resection of affected colon and creation of colostomy 9/25  ID following: Continue IV Zyvox, IV Zosyn, and Diflucan  Continue mobilization as tolerated:  Out of bed to chair t.i.d. with meals  PT/OT following  Continue regular diet  Pain management    Essential hypertension  Chronic, controlled. Latest blood pressure and vitals reviewed-     Temp:  [97.7 °F (36.5 °C)-99.2 °F (37.3 °C)]   Pulse:  []   Resp:  [16-20]   BP: (111-148)/(60-94)   SpO2:  [91 %-100 %] .   Home meds for hypertension were reviewed and noted below.   Hypertension Medications               cloNIDine (CATAPRES) 0.3 MG tablet TAKE 1 TABLET BY MOUTH THREE TIMES DAILY    hydrALAZINE (APRESOLINE) 100 MG tablet Take 1 tablet (100 mg total) by mouth 3 (three) times daily.    isosorbide mononitrate (IMDUR) 120 MG 24 hr tablet Take 120 mg by mouth once daily.    metoprolol succinate (TOPROL-XL) 50 MG 24 hr tablet Take 150 mg by mouth once daily.    olmesartan (BENICAR) 40 MG tablet Take 1 tablet by mouth once daily        While in the hospital, will manage blood pressure as follows; Continue current antihypertensive regimen: hydralazine, losartan, clonidine, and metoprolol.  Patient was apparently getting nitro paste q.6 hours.  " This was discontinued yesterday and his home isosorbide was resumed.    Will utilize p.r.n. blood pressure medication only if patient's blood pressure greater than 180/110 and he develops symptoms such as worsening chest pain or shortness of breath.    Sleep apnea  Currently getting BiPAP nightly while in the hospital per pulmonology    Pulmonary hypertension  Chronic.  No need to address acutely.    Severe obesity with body mass index (BMI) of 35.0 to 39.9 with comorbidity  Body mass index is 37.93 kg/m². Morbid obesity complicates all aspects of disease management from diagnostic modalities to treatment. Weight loss encouraged and health benefits explained to patient.     Anemia due to chronic kidney disease, on chronic dialysis  Anemia is likely due to acute blood loss which was from surgery and chronic disease due to ESRD. Most recent hemoglobin and hematocrit are listed below.  Recent Labs     09/29/24  1110 09/30/24  0627 10/01/24  0546   HGB 8.5* 7.4* 8.3*   HCT 27.4* 24.3* 28.1*     Plan  - Monitor serial CBC: Daily  - Transfuse PRBC if patient becomes hemodynamically unstable, symptomatic or H/H drops below 7/21.  - Patient has received 3 units of PRBCs  - Patient's anemia is currently stable    ESRD (end stage renal disease)  Creatine stable for now. BMP reviewed- noted Estimated Creatinine Clearance: 10.7 mL/min (A) (based on SCr of 13 mg/dL (H)). according to latest data. Based on current GFR, CKD stage is end stage.  Monitor UOP and serial BMP and adjust therapy as needed. Renally dose meds. Avoid nephrotoxic medications and procedures.  Nephrology following  Dialysis management per Nephrology  Renal diet      VTE Risk Mitigation (From admission, onward)           Ordered     heparin (porcine) injection 4,000 Units  As needed (PRN)         09/20/24 1544     IP VTE HIGH RISK PATIENT  Once         09/17/24 1031     Place sequential compression device  Until discontinued         09/17/24 1031                     Discharge Planning   MARIA ISABEL: 10/4/2024     Code Status: Full Code   Is the patient medically ready for discharge?:     Reason for patient still in hospital (select all that apply): Patient unstable, Patient trending condition, Treatment, and Pending disposition  Discharge Plan A: Rehab   Discharge Delays: None known at this time              Myra Cummings NP  Department of Hospital Medicine   [facility]

## 2024-10-03 LAB
ALBUMIN SERPL BCP-MCNC: 3.6 G/DL (ref 3.5–5.2)
ALP SERPL-CCNC: 118 U/L (ref 55–135)
ALT SERPL W/O P-5'-P-CCNC: 35 U/L (ref 10–44)
ANION GAP SERPL CALC-SCNC: 19 MMOL/L (ref 8–16)
ANISOCYTOSIS BLD QL SMEAR: ABNORMAL
AST SERPL-CCNC: 31 U/L (ref 10–40)
BASOPHILS NFR BLD: 0 % (ref 0–1.9)
BILIRUB SERPL-MCNC: 0.4 MG/DL (ref 0.1–1)
BUN SERPL-MCNC: 37 MG/DL (ref 6–20)
CALCIUM SERPL-MCNC: 9.4 MG/DL (ref 8.7–10.5)
CHLORIDE SERPL-SCNC: 89 MMOL/L (ref 95–110)
CO2 SERPL-SCNC: 23 MMOL/L (ref 23–29)
CREAT SERPL-MCNC: 12.7 MG/DL (ref 0.5–1.4)
CRP SERPL-MCNC: 20 MG/DL
DIFFERENTIAL METHOD BLD: ABNORMAL
EOSINOPHIL NFR BLD: 0 % (ref 0–8)
ERYTHROCYTE [DISTWIDTH] IN BLOOD BY AUTOMATED COUNT: 21.1 % (ref 11.5–14.5)
ERYTHROCYTE [SEDIMENTATION RATE] IN BLOOD BY WESTERGREN METHOD: 37 MM/HR (ref 0–10)
EST. GFR  (NO RACE VARIABLE): 4.5 ML/MIN/1.73 M^2
GIANT PLATELETS BLD QL SMEAR: PRESENT
GLUCOSE SERPL-MCNC: 194 MG/DL (ref 70–110)
HCT VFR BLD AUTO: 27.8 % (ref 40–54)
HGB BLD-MCNC: 8.5 G/DL (ref 14–18)
IMM GRANULOCYTES # BLD AUTO: ABNORMAL K/UL (ref 0–0.04)
IMM GRANULOCYTES NFR BLD AUTO: ABNORMAL % (ref 0–0.5)
LYMPHOCYTES NFR BLD: 13 % (ref 18–48)
MAGNESIUM SERPL-MCNC: 2.3 MG/DL (ref 1.6–2.6)
MCH RBC QN AUTO: 30.1 PG (ref 27–31)
MCHC RBC AUTO-ENTMCNC: 30.6 G/DL (ref 32–36)
MCV RBC AUTO: 99 FL (ref 82–98)
METAMYELOCYTES NFR BLD MANUAL: 2 %
MONOCYTES NFR BLD: 8 % (ref 4–15)
NEUTROPHILS NFR BLD: 74 % (ref 38–73)
NEUTS BAND NFR BLD MANUAL: 3 %
NRBC BLD-RTO: 0 /100 WBC
PLATELET # BLD AUTO: 511 K/UL (ref 150–450)
PLATELET BLD QL SMEAR: ABNORMAL
PMV BLD AUTO: 9.8 FL (ref 9.2–12.9)
POCT GLUCOSE: 130 MG/DL (ref 70–110)
POCT GLUCOSE: 130 MG/DL (ref 70–110)
POCT GLUCOSE: 152 MG/DL (ref 70–110)
POCT GLUCOSE: 99 MG/DL (ref 70–110)
POTASSIUM SERPL-SCNC: 4.5 MMOL/L (ref 3.5–5.1)
PROT SERPL-MCNC: 9.1 G/DL (ref 6–8.4)
RBC # BLD AUTO: 2.82 M/UL (ref 4.6–6.2)
SODIUM SERPL-SCNC: 131 MMOL/L (ref 136–145)
WBC # BLD AUTO: 15.81 K/UL (ref 3.9–12.7)

## 2024-10-03 PROCEDURE — 94761 N-INVAS EAR/PLS OXIMETRY MLT: CPT

## 2024-10-03 PROCEDURE — 12000002 HC ACUTE/MED SURGE SEMI-PRIVATE ROOM

## 2024-10-03 PROCEDURE — 86140 C-REACTIVE PROTEIN: CPT | Performed by: SURGERY

## 2024-10-03 PROCEDURE — 99900031 HC PATIENT EDUCATION (STAT)

## 2024-10-03 PROCEDURE — 83735 ASSAY OF MAGNESIUM: CPT | Performed by: SURGERY

## 2024-10-03 PROCEDURE — 99900035 HC TECH TIME PER 15 MIN (STAT)

## 2024-10-03 PROCEDURE — 25000003 PHARM REV CODE 250: Performed by: SURGERY

## 2024-10-03 PROCEDURE — 80053 COMPREHEN METABOLIC PANEL: CPT | Performed by: SURGERY

## 2024-10-03 PROCEDURE — 25000003 PHARM REV CODE 250

## 2024-10-03 PROCEDURE — 63600175 PHARM REV CODE 636 W HCPCS: Performed by: INTERNAL MEDICINE

## 2024-10-03 PROCEDURE — 85007 BL SMEAR W/DIFF WBC COUNT: CPT | Performed by: SURGERY

## 2024-10-03 PROCEDURE — 94660 CPAP INITIATION&MGMT: CPT

## 2024-10-03 PROCEDURE — 87205 SMEAR GRAM STAIN: CPT | Performed by: NURSE PRACTITIONER

## 2024-10-03 PROCEDURE — 25000003 PHARM REV CODE 250: Performed by: INTERNAL MEDICINE

## 2024-10-03 PROCEDURE — 99232 SBSQ HOSP IP/OBS MODERATE 35: CPT | Mod: ,,, | Performed by: INTERNAL MEDICINE

## 2024-10-03 PROCEDURE — 25000003 PHARM REV CODE 250: Performed by: NURSE PRACTITIONER

## 2024-10-03 PROCEDURE — 85651 RBC SED RATE NONAUTOMATED: CPT | Performed by: SURGERY

## 2024-10-03 PROCEDURE — 97116 GAIT TRAINING THERAPY: CPT | Mod: CQ

## 2024-10-03 PROCEDURE — 63700000 PHARM REV CODE 250 ALT 637 W/O HCPCS: Performed by: INTERNAL MEDICINE

## 2024-10-03 PROCEDURE — 63600175 PHARM REV CODE 636 W HCPCS: Performed by: SURGERY

## 2024-10-03 PROCEDURE — 36415 COLL VENOUS BLD VENIPUNCTURE: CPT | Performed by: SURGERY

## 2024-10-03 PROCEDURE — 85027 COMPLETE CBC AUTOMATED: CPT | Performed by: SURGERY

## 2024-10-03 PROCEDURE — 87070 CULTURE OTHR SPECIMN AEROBIC: CPT | Performed by: NURSE PRACTITIONER

## 2024-10-03 PROCEDURE — 87075 CULTR BACTERIA EXCEPT BLOOD: CPT | Performed by: NURSE PRACTITIONER

## 2024-10-03 PROCEDURE — 63600175 PHARM REV CODE 636 W HCPCS: Performed by: RADIOLOGY

## 2024-10-03 RX ORDER — HYDROMORPHONE HYDROCHLORIDE 1 MG/ML
0.5 INJECTION, SOLUTION INTRAMUSCULAR; INTRAVENOUS; SUBCUTANEOUS ONCE
Status: COMPLETED | OUTPATIENT
Start: 2024-10-03 | End: 2024-10-03

## 2024-10-03 RX ORDER — LIDOCAINE HYDROCHLORIDE 10 MG/ML
INJECTION, SOLUTION EPIDURAL; INFILTRATION; INTRACAUDAL; PERINEURAL
Status: COMPLETED | OUTPATIENT
Start: 2024-10-03 | End: 2024-10-03

## 2024-10-03 RX ORDER — HYDROMORPHONE HYDROCHLORIDE 2 MG/1
4 TABLET ORAL EVERY 4 HOURS PRN
Status: DISCONTINUED | OUTPATIENT
Start: 2024-10-03 | End: 2024-10-06 | Stop reason: HOSPADM

## 2024-10-03 RX ADMIN — Medication 2 SPRAY: at 09:10

## 2024-10-03 RX ADMIN — LIDOCAINE HYDROCHLORIDE 8 ML: 10 INJECTION, SOLUTION EPIDURAL; INFILTRATION; INTRACAUDAL; PERINEURAL at 10:10

## 2024-10-03 RX ADMIN — HYDROMORPHONE HYDROCHLORIDE 4 MG: 2 TABLET ORAL at 07:10

## 2024-10-03 RX ADMIN — HYDRALAZINE HYDROCHLORIDE 100 MG: 25 TABLET ORAL at 09:10

## 2024-10-03 RX ADMIN — PIPERACILLIN SODIUM AND TAZOBACTAM SODIUM 4.5 G: 4; .5 INJECTION, POWDER, LYOPHILIZED, FOR SOLUTION INTRAVENOUS at 03:10

## 2024-10-03 RX ADMIN — SIMETHICONE 80 MG: 80 TABLET, CHEWABLE ORAL at 09:10

## 2024-10-03 RX ADMIN — FLUCONAZOLE 100 MG: 100 TABLET ORAL at 09:10

## 2024-10-03 RX ADMIN — METOPROLOL SUCCINATE 150 MG: 50 TABLET, FILM COATED, EXTENDED RELEASE ORAL at 09:10

## 2024-10-03 RX ADMIN — KETOCONAZOLE: 20.5 SHAMPOO, SUSPENSION TOPICAL at 09:10

## 2024-10-03 RX ADMIN — HYDROMORPHONE HYDROCHLORIDE 2 MG: 2 TABLET ORAL at 05:10

## 2024-10-03 RX ADMIN — MICONAZOLE NITRATE: 20 CREAM TOPICAL at 09:10

## 2024-10-03 RX ADMIN — SEVELAMER CARBONATE 1600 MG: 800 TABLET, FILM COATED ORAL at 04:10

## 2024-10-03 RX ADMIN — GABAPENTIN 100 MG: 100 CAPSULE ORAL at 09:10

## 2024-10-03 RX ADMIN — LINEZOLID 600 MG: 600 TABLET, FILM COATED ORAL at 09:10

## 2024-10-03 RX ADMIN — CLONIDINE HYDROCHLORIDE 0.3 MG: 0.1 TABLET ORAL at 09:10

## 2024-10-03 RX ADMIN — HYDROMORPHONE HYDROCHLORIDE 4 MG: 2 TABLET ORAL at 04:10

## 2024-10-03 RX ADMIN — SEVELAMER CARBONATE 1600 MG: 800 TABLET, FILM COATED ORAL at 12:10

## 2024-10-03 RX ADMIN — HYDRALAZINE HYDROCHLORIDE 100 MG: 25 TABLET ORAL at 03:10

## 2024-10-03 RX ADMIN — HYDROMORPHONE HYDROCHLORIDE 0.5 MG: 0.5 INJECTION, SOLUTION INTRAMUSCULAR; INTRAVENOUS; SUBCUTANEOUS at 10:10

## 2024-10-03 NOTE — PROGRESS NOTES
INPATIENT NEPHROLOGY Progress Note  [facility] NEPHROLOGY    João Ham  10/03/2024    Reason for consultation:  ESRD    Chief Complaint:   Chief Complaint   Patient presents with    Fatigue    Groin Swelling     X 2 days.  Hx of Kidney failure and CHF     History of Present Illness:  Per H and P    Patient is a 44 year old male with history of ESDR on dialysis MWF, awaiting kidney transplant, HTN, presented to ED with 3 days history of left groin pain and swelling. States swelling comes every time he cough and is being painful. Patient has low grade fever 99s at home. He has been vomiting bilious fluid last 2-3 days. No diarrhea or abdominal pain.      In the ED CT abdomen showed Left inguinal hernia containing fat and air as well as marked inflammation extending from inside the pelvis, in the hernia and down into the left scrotum. This is consistent with an infected inguinal hernia, possible gangrene. WBC was 14, patient was tachycardic. General surgery was consulted and patient was admitted under hospitalist.     9/19  avf nonfunctional.   No sob or chest pain.  Has post op pain.  AFVSS  9/20  afvss.  Pt doesn't want to see previous vascular surgeon who put his avf in.  No alternative available.  Transfer center called.  Has temporary trialysis catheter.  Patient seen on hemodialysis for uremic clearance and ultrafiltration.    9/21  2.5L off w/HD yesterday.  Tmax 101.8, pressures ok.  Lab results reviewed, Hgb low but better than yesterday.  C/o post op pain, no other complaints.  9/22 POD 5.  VSS, lab results reviewed.  Hgb 7.6, added epo to HD orders.  C/o gas pains, plans to move around more today.  Next HD tomorrow.  9/23 VSS. HD today. Transfuse with next HD as needed if Hgb stil low.  9/24 VSS. Appreciate input from Urology, Gen Surgery, Vascular Surgery, ID on this complex patient. Plan for fistulogram tomorrow. CXR yesterday shows trialysis line tip in appropriate cocation. Will attempt HD today since  we skipped yesterday due to nurse and patient concerns that the line may be malpositioned.  9/25 VSS. s/p cephalic vein dilation by Dr. Robledo yesterday, tolerated HD very well. Hold HD today. Hypotension this AM, low grade fever yesterday, WBC elevated, received IVF bolus and Midodrine. Continues to be infected per Surgery, we can remove trialysis line now that AVF is treated and usable... Consider ICU. Stopped hydralazine and olmesartan, decreased Clonidine to 0.1 BID, not sure what to do with Metoprolol 150mg and Hydralazine 120mg... He may be sob/hypoxic due to anemia, suggest 1 PRBC instead of IVF, since Hgb is 7.    Addendum @ 10:47 AM  Seen at bedside in ICU with Dr. Rodriguez and Dr. Au. Mother present as well. Reviewed imaging. Agree with plan for urgent ex lap, remove central line and place mid line, keep current abx, hold on transfusion and dialysis and reassess later. ABG and fresh set of cx now, re-evalaute later.    Addendum @ 4:36 PM  Discussed briefly with Dr. Connors, dialysis nurse Brant and with ICU nurse. Patient had colostomy placed due to anastomotic leak and had 2 PRBC given. Upon return to floor was hypertensive with SBP > 200 and NPO. Advised Cleviprex drip for now (earlier to day he was on max dose Clonidine, mad dose Hydralazine, max dose Olmesartan, 150mg Metoprolol-XL and 120mg of Imdur - none of which he can get due to NPO) and will do urgent HD with UF 2L or so as tolerated.  9/26  POD 1 s/p . Exploratory laparotomy,. Colon resection end-colostomy, Mobilization of splenic flexure, and left inguinal canal exploration.  Post pain. No sob.  Sleepy.    9/27  AFVSS.  Some post op pain.  No sob.  No angina.   Patient seen on hemodialysis for uremic clearance and ultrafiltration.    9/28 s/p 3.8 liter uf yesterday.     AFVSS.  Post op pain.  Dry cough.  No sob.  Projectile vomiting earlier today per patient  9/30 VSS. HD today.  10/1 GS note reviewed, working on pain control, tolerating  diet  10/2 no major events overnight- due for HD today  10/3 fever, tachycardia, abd pain and stagnant leukocytosis yest- CT with possible fluid collection- may need IR drainage- remains on IV antbx    Plan of Care:    ESRD on HD MWF  --continue dialysis per routine    Anemia of CKD  --continue LOR with HD  --transfused this admission    Secondary HPT  --renal diet  --continue binders with meals    Hypertension  --continue current BP meds  --uf with hd as needed    Hyponatremia  Hyperkalemia  Acidosis  --adjust dialysate  --renal diet    AVF malfunction fixed  --appreciate Vascular eval, s/p fistulogram and cephalic vein stenosis dilation on 9/23 by Dr. Salvador.    Inguinal hernia of left side with gangrene - W/ Colonic perforation with abscess   --S/P Robotic sigmoid resection, Mobilization of splenic flexure, left inguinal canal exploration, and I&D 9/17 per Dr. Connors   --dose antbx for CrCl < 10/HD  --CT 10/2 with possible new fluid collection- awaiting GS recs- may need IR drainage    Thank you for allowing us to participate in this patient's care. We will continue to follow.    Vital Signs:  Temp Readings from Last 3 Encounters:   10/03/24 98.1 °F (36.7 °C) (Oral)   08/22/24 97.3 °F (36.3 °C) (Temporal)   07/30/24 98.6 °F (37 °C) (Oral)       Pulse Readings from Last 3 Encounters:   10/03/24 73   08/29/24 (!) 115   08/22/24 110       BP Readings from Last 3 Encounters:   10/03/24 (!) 108/56   08/29/24 99/68   08/22/24 (!) 137/91       Weight:  Wt Readings from Last 3 Encounters:   09/18/24 134 kg (295 lb 6.7 oz)   08/29/24 131.3 kg (289 lb 7.4 oz)   08/22/24 131 kg (288 lb 12.8 oz)       Medications:  No current facility-administered medications on file prior to encounter.     Current Outpatient Medications on File Prior to Encounter   Medication Sig Dispense Refill    apixaban (ELIQUIS) 2.5 mg Tab Take 2.5 mg by mouth 2 (two) times daily.      calcitRIOL (ROCALTROL) 0.25 MCG Cap Take 1 capsule by mouth once  daily (Patient taking differently: Take 0.25 mcg by mouth once daily.) 90 capsule 1    cholecalciferol, vitamin D3, 125 mcg (5,000 unit) Tab Take 1 tablet by mouth once daily.      cloNIDine (CATAPRES) 0.3 MG tablet TAKE 1 TABLET BY MOUTH THREE TIMES DAILY 270 tablet 2    isosorbide mononitrate (IMDUR) 120 MG 24 hr tablet Take 120 mg by mouth once daily.      metoprolol succinate (TOPROL-XL) 50 MG 24 hr tablet Take 150 mg by mouth once daily.      olmesartan (BENICAR) 40 MG tablet Take 1 tablet by mouth once daily 90 tablet 0    sevelamer carbonate (RENVELA) 800 mg Tab Take 2 tablets (1,600 mg total) by mouth 3 (three) times daily with meals. 180 tablet 11    calcium carbonate (TUMS) 200 mg calcium (500 mg) chewable tablet Take 2 tablets (1,000 mg total) by mouth 3 (three) times daily with meals. (Patient taking differently: Take 1,000 mg by mouth 3 (three) times daily.) 180 tablet 11    hydrALAZINE (APRESOLINE) 100 MG tablet Take 1 tablet (100 mg total) by mouth 3 (three) times daily. 90 tablet 11     Scheduled Meds:   cloNIDine  0.3 mg Oral TID    epoetin mily-epbx  10,000 Units Intravenous Every Mon, Wed, Fri    fluconazole  100 mg Oral Daily    gabapentin  100 mg Oral TID    hydrALAZINE  100 mg Oral Q8H    HYDROmorphone  1 mg Intravenous Once    isosorbide mononitrate  30 mg Oral Daily    ketoconazole   Topical (Top) Every Other Day    LIDOcaine-prilocaine   Topical (Top) Once    linezolid  600 mg Oral Q12H    losartan  100 mg Oral Daily    metoprolol succinate  150 mg Oral Daily    miconazole   Topical (Top) BID    oxymetazoline  2 spray Each Nostril BID    piperacillin-tazobactam (Zosyn) IV (PEDS and ADULTS) (extended infusion is not appropriate)  4.5 g Intravenous Q12H    sevelamer carbonate  1,600 mg Oral TID WM     Continuous Infusions:        PRN Meds:.  Current Facility-Administered Medications:     0.9% NaCl, , Intravenous, PRN    acetaminophen, 650 mg, Oral, Q4H PRN    ALPRAZolam, 0.25 mg, Oral, Nightly  "PRN    aluminum-magnesium hydroxide-simethicone, 30 mL, Oral, QID PRN    dextrose 10%, 12.5 g, Intravenous, PRN    dextrose 10%, 12.5 g, Intravenous, PRN    dextrose 10%, 12.5 g, Intravenous, PRN    dextrose 10%, 25 g, Intravenous, PRN    dextrose 10%, 25 g, Intravenous, PRN    dextrose 10%, 25 g, Intravenous, PRN    glucagon (human recombinant), 1 mg, Intramuscular, PRN    glucose, 16 g, Oral, PRN    glucose, 24 g, Oral, PRN    heparin (porcine), 4,000 Units, Intravenous, PRN    HYDROmorphone, 2 mg, Oral, Q4H PRN    insulin aspart U-100, 0-5 Units, Subcutaneous, QID (AC + HS) PRN    iohexoL, 100 mL, Intravenous, ONCE PRN    LIDOcaine-prilocaine, , Topical (Top), PRN    magnesium oxide, 800 mg, Oral, PRN    magnesium oxide, 800 mg, Oral, PRN    melatonin, 6 mg, Oral, Nightly PRN    naloxone, 0.02 mg, Intravenous, PRN    ondansetron, 4 mg, Intravenous, Q6H PRN    oxyCODONE-acetaminophen, 1 tablet, Oral, Q4H PRN    potassium bicarbonate, 35 mEq, Oral, PRN    potassium bicarbonate, 50 mEq, Oral, PRN    potassium bicarbonate, 60 mEq, Oral, PRN    potassium, sodium phosphates, 2 packet, Oral, PRN    potassium, sodium phosphates, 2 packet, Oral, PRN    potassium, sodium phosphates, 2 packet, Oral, PRN    promethazine (PHENERGAN) 12.5 mg in 0.9% NaCl 50 mL IVPB, 12.5 mg, Intravenous, Q6H PRN    simethicone, 1 tablet, Oral, TID PRN    sodium chloride 0.9%, 250 mL, Intravenous, PRN    sodium chloride 0.9%, 3 mL, Intravenous, Q12H PRN    Review of Systems:  Neg    Physical Exam:    BP (!) 108/56   Pulse 73   Temp 98.1 °F (36.7 °C) (Oral)   Resp 18   Ht 6' 2" (1.88 m)   Wt 134 kg (295 lb 6.7 oz)   SpO2 95%   BMI 37.93 kg/m²     General Appearance:    Alert, cooperative, no distress, appears stated age   Head:    Normocephalic, without obvious abnormality, atraumatic   Eyes:    PER, conjunctiva/corneas clear, EOM's intact in both eyes        Throat:   Lips, mucosa, and tongue normal; teeth and gums normal   Back:     " Symmetric, no curvature, ROM normal, no CVA tenderness   Lungs:     Clear to auscultation bilaterally, respirations unlabored   Chest wall:    No tenderness or deformity   Heart:    Regular rate and rhythm, S1 and S2 normal, no murmur, rub   or gallop   Abdomen:     Soft, non-tender, bowel sounds active all four quadrants,     no masses, no organomegaly   Extremities:   Extremities normal, atraumatic, no cyanosis or edema   Pulses:   2+ and symmetric all extremities   MSK:   No joint or muscle swelling, tenderness or deformity   Skin:   Skin color, texture, turgor normal, no rashes or lesions   Neurologic:   CNII-XII intact, normal strength and sensation       Throughout.  No flap     Results:  Recent Labs   Lab 10/01/24  0546 10/02/24  0558 10/03/24  0535    133* 131*   K 4.7 4.3 4.5   CL 96 94* 89*   CO2 23 19* 23   BUN 44* 53* 37*   CREATININE 13.0* 15.2* 12.7*    114* 194*       Recent Labs   Lab 10/01/24  0546 10/02/24  0558 10/03/24  0535   CALCIUM 9.4 9.3 9.4   ALBUMIN 3.4* 3.3* 3.6   MG 2.5 2.4 2.3       Recent Labs   Lab 02/02/23  0745 05/19/23  1022 06/19/23  1031   PTH, Intact 225.6 H 335.8 H 319.0 H       Recent Labs   Lab 09/29/24  2031 09/30/24  0748 10/01/24  0741 10/01/24  1154 10/01/24  1638 10/01/24  2007 10/02/24  0719 10/02/24  1643 10/02/24  2102 10/03/24  0703   POCTGLUCOSE 142* 111* 124* 166* 132* 138* 126* 146* 141* 152*       Recent Labs   Lab 10/10/22  2005 06/22/23  0446 07/16/24  0510   Hemoglobin A1C 5.5 5.5 6.1       Recent Labs   Lab 10/01/24  0546 10/02/24  0558 10/03/24  0535   WBC 15.59* 15.46* 15.81*   HGB 8.3* 8.1* 8.5*   HCT 28.1* 26.9* 27.8*   * 497* 511*   MCV 99* 99* 99*   MCHC 29.5* 30.1* 30.6*   MONO 15.0 17.0*  CANCELED 8.0   EOSINOPHIL 0.0 0.0 0.0       Recent Labs   Lab 10/01/24  0546 10/02/24  0558 10/03/24  0535   BILITOT 0.4 0.4 0.4   PROT 8.6* 8.4 9.1*   ALBUMIN 3.4* 3.3* 3.6   ALKPHOS 117 110 118   ALT 36 32 35   AST 34 31 31       Recent Labs    Lab 10/10/22  1645 01/09/23  1123 06/19/23  1622 07/17/24  0505 09/19/24  1435   Color, UA Yellow   < > Straw Yellow Yellow   Appearance, UA Clear   < > Clear Hazy A Clear   pH, UA 6.0   < > 6.0 6.0 8.0   Specific Gravity, UA 1.025   < > 1.010 1.020 1.010   Protein, UA 2+ A   < > 2+ A 3+ A 2+ A   Glucose, UA Negative   < > Negative Negative Negative   Ketones, UA Negative   < > Negative Negative Negative   Urobilinogen, UA Negative  --   --  Negative Negative   Bilirubin (UA) Negative   < > Negative Negative Negative   Occult Blood UA 1+ A   < > Negative 2+ A 2+ A   Nitrite, UA Negative   < > Negative Negative Negative   RBC, UA 1   < > 0 6 H 4   WBC, UA 0   < > 2 52 H 21 H   Bacteria None   < > None None None   Hyaline Casts, UA 0   < > 0 0 0    < > = values in this interval not displayed.       Recent Labs   Lab 07/15/24  1824 09/17/24  0928 09/25/24  1051   POC PH  --   --  7.404   POC PCO2  --   --  37.7   POC HCO3  --   --  23.6 L   POC PO2  --   --  87   POC SATURATED O2  --   --  97   POC BE  --   --  -1   Sample VENOUS VENOUS ARTERIAL       Microbiology Results (last 7 days)       Procedure Component Value Units Date/Time    Fungus culture [[id number]] Collected: 09/25/24 1413    Order Status: Completed Specimen: Incision site from Abdomen Updated: 10/03/24 0444     Fungus (Mycology) Culture No fungal growth to date    Culture, Anaerobe [[id number]]  (Abnormal) Collected: 09/25/24 1413    Order Status: Completed Specimen: Incision site from Abdomen Updated: 10/01/24 1418     Anaerobic Culture PARABACTEROIDES DISTASONIS  Few  Beta Lactamase positive      Blood culture [[id number]] Collected: 09/25/24 1111    Order Status: Completed Specimen: Blood from Peripheral, Antecubital, Right Updated: 09/30/24 1432     Blood Culture, Routine No growth after 5 days.    Narrative:      [id number]    Blood culture [[id number]] Collected: 09/25/24 1111    Order Status: Completed Specimen: Blood from Peripheral, Forearm, Right  Updated: 09/30/24 1432     Blood Culture, Routine No growth after 5 days.    Narrative:      [id number]    Blood culture [[id number]] Collected: 09/24/24 1513    Order Status: Completed Specimen: Blood Updated: 09/29/24 1632     Blood Culture, Routine No growth after 5 days.    Narrative:      Collection has been rescheduled by [email or web address] at 09/23/2024 14:35 Reason:   Patient unavailable dialysis   Collection has been rescheduled by MYB at 09/23/2024 18:44 Reason:   Unable to collect  Collection has been rescheduled by CZB at 09/23/2024 19:02 Reason:   Unable to collect  Collection has been rescheduled by RE1 at 09/24/2024 09:43 Reason:   Spoke to the patient's nurse about unable to collect she is   contacting Lambert and the doctor  Collection has been rescheduled by [email or web address] at 09/23/2024 14:35 Reason:   Patient unavailable dialysis   Collection has been rescheduled by MYB at 09/23/2024 18:44 Reason:   Unable to collect  Collection has been rescheduled by CZB at 09/23/2024 19:02 Reason:   Unable to collect  Collection has been rescheduled by RE1 at 09/24/2024 09:43 Reason:   Spoke to the patient's nurse about unable to collect she is   contacting Lambert and the doctor    Blood culture [[id number]] Collected: 09/24/24 1122    Order Status: Completed Specimen: Blood Updated: 09/29/24 1232     Blood Culture, Routine No growth after 5 days.    Narrative:      Collection has been rescheduled by [email or web address] at 09/23/2024 14:35 Reason:   Patient unavailable dialysis   Collection has been rescheduled by MYB at 09/23/2024 18:44 Reason:   Unable to collect  Collection has been rescheduled by CZB at 09/23/2024 19:02 Reason:   Unable to collect  Collection has been rescheduled by RE1 at 09/24/2024 09:43 Reason:   Spoke to the patient's nurse about unable to collect she is   contacting Lambert and the doctor  Collection has been rescheduled by [email or web address] at 09/23/2024 14:35 Reason:   Patient unavailable dialysis   Collection has been rescheduled by MYB at  09/23/2024 18:44 Reason:   Unable to collect  Collection has been rescheduled by CZB at 09/23/2024 19:02 Reason:   Unable to collect  Collection has been rescheduled by RE1 at 09/24/2024 09:43 Reason:   Spoke to the patient's nurse about unable to collect she is   contacting Lambert and the doctor    AFB Culture & Smear [[id number]] Collected: 09/25/24 1413    Order Status: Completed Specimen: Incision site from Abdomen Updated: 09/28/24 1630     AFB CULTURE STAIN No acid fast bacilli seen.     AFB CULTURE STAIN Testing performed by:     AFB CULTURE STAIN Lab [facility] [place]     AFB CULTURE STAIN [address] [facility]     [place] [address]     AFB CULTURE STAIN Dr. Osbaldo Sherwood MD    IV catheter culture [[id number]] Collected: 09/25/24 1132    Order Status: Completed Specimen: Catheter Tip, Dialysis Updated: 09/28/24 1113     Aerobic Culture - Cath tip No growth    Narrative:      Trialysis    Aerobic culture [[id number]]  (Abnormal)  (Susceptibility) Collected: 09/24/24 1535    Order Status: Completed Specimen: Incision site from Groin Updated: 09/28/24 0738     Aerobic Bacterial Culture ESCHERICHIA COLI  From broth only      Narrative:      Left groin incision site drainage swab    Aerobic culture [[id number]]  (Abnormal)  (Susceptibility) Collected: 09/25/24 1413    Order Status: Completed Specimen: Incision site from Abdomen Updated: 09/28/24 0737     Aerobic Bacterial Culture ESCHERICHIA COLI  From broth only      Gram stain [[id number]] Collected: 09/25/24 1413    Order Status: Completed Specimen: Incision site from Abdomen Updated: 09/27/24 1530     Gram Stain Result Moderate WBC's      No organisms seen          Patient care time was spent personally by me on the following activities: > 35 minutes  Obtaining a history.  Examination of patient.  Providing medical care at the patients bedside.  Developing a treatment plan with patient or surrogate and bedside caregivers.  Ordering and  reviewing laboratory studies, radiographic studies, pulse oximetry.  Ordering and performing treatments and interventions.  Evaluation of patient's response to treatment.  Discussions with consultants while on the unit and immediately available to the patient.  Re-evaluation of the patient's condition.  Documentation in the medical record.      Oni Garcia MD    [facility] [place]  [phone]

## 2024-10-03 NOTE — ASSESSMENT & PLAN NOTE
S/p open laparotomy with resection of affected colon and creation of colostomy 9/25  ID following: Continue IV Zyvox, IV Zosyn, and Diflucan  Continue mobilization as tolerated:  Out of bed to chair t.i.d. with meals  PT/OT following  Continue regular diet  Pain management  -Repeat CT on 10/2 with some new fluid collections- IR to see if abscess vs. Seroma.

## 2024-10-03 NOTE — CARE UPDATE
10/02/24 2327   Patient Assessment/Suction   Level of Consciousness (AVPU) alert   Respiratory Effort Normal;Unlabored   Expansion/Accessory Muscles/Retractions no use of accessory muscles;no retractions   All Lung Fields Breath Sounds diminished   Rhythm/Pattern, Respiratory unlabored;pattern regular;no shortness of breath reported   Cough Frequency no cough   Skin Integrity   $ Wound Care Tech Time 15 min   Area Observed Bridge of nose;Cheek   Skin Appearance without discoloration   PRE-TX-O2   Device (Oxygen Therapy) BIPAP   Oxygen Concentration (%) 28   SpO2 96 %   Pulse Oximetry Type Intermittent   Pulse 95   Resp 18   Aerosol Therapy   $ Aerosol Therapy Charges PRN treatment not required   Daily Review of Necessity (SVN) completed   Respiratory Treatment Status (SVN) PRN treatment not required   Incentive Spirometer   $ Incentive Spirometer Charges done with encouragement   Incentive Spirometer Predicted Level (mL) 2000   Administration (IS) mouthpiece utilized;instruction provided, follow-up;proper technique demonstrated   Number of Repetitions (IS) 5   Level Incentive Spirometer (mL) 3000   Patient Tolerance (IS) no adverse signs/symptoms present;good   Ready to Wean/Extubation Screen   FIO2<=50 (chart decimal) 0.28   Preset CPAP/BiPAP Settings   Mode Of Delivery BiPAP S/T   $ CPAP/BiPAP Daily Charge BiPAP/CPAP Daily   $ Initial CPAP/BiPAP Setup? No   $ Is patient using? Yes   Size of Mask Large   Sized Appropriately? Yes   Equipment Type V60   Airway Device Type large nasal mask   Ipap 14   EPAP (cm H2O) 7   Pressure Support (cm H2O) 7   Set Rate (Breaths/Min) 14   ITime (sec) 1   Rise Time (sec) 3   Patient CPAP/BiPAP Settings   CPAP/BIPAP ID 10   Timed Inspiration (Sec) 1   IPAP Rise Time (sec) 3   RR Total (Breaths/Min) 14   Tidal Volume (mL) 445   VE Minute Ventilation (L/min) 6.2 L/min   Peak Inspiratory Pressure (cm H2O) 14   TiTOT (%) 24   Total Leak (L/Min) 0   Patient Trigger - ST Mode Only (%)  80   CPAP/BiPAP Alarms   High Pressure (cm H2O) 40   Low Pressure (cm H2O) 5   Minute Ventilation (L/Min) 3   High RR (breaths/min) 45   Low RR (breaths/min) 8   Apnea (Sec) 20

## 2024-10-03 NOTE — PT/OT/SLP PROGRESS
Occupational Therapy      Patient Name:  João Ham   MRN:  [id number]    Patient not seen today secondary to Off the floor for procedure/surgery (at CT scan) when attempted this AM.   10/3/2024

## 2024-10-03 NOTE — PROGRESS NOTES
[facility]  Progress Note    Patient Name: João Ham  MRN: [id number]  Patient Class: IP- Inpatient   Admission Date: 9/17/2024  Length of Stay: 16 days  Attending Physician: Wilfrido Rich MD  Primary Care Provider: Dawson Granado MD        Subjective:     Principal Problem:Inguinal hernia of left side with gangrene        HPI:  Patient is a 44 year old male with history of ESDR on dialysis MWF, awaiting kidney transplant, HTN, presented to ED with 3 days history of left groin pain and swelling. States swelling comes every time he cough and is being painful. Patient has low grade fever 99s at home. He has been vomiting bilious fluid last 2-3 days. No diarrhea or abdominal pain.     In the ED CT abdomen showed Left inguinal hernia containing fat and air as well as marked inflammation extending from inside the pelvis, in the hernia and down into the left scrotum. This is consistent with an infected inguinal hernia, possible gangrene. WBC was 14, patient was tachycardic. General surgery was consulted and patient was admitted under hospitalist.     Overview/Hospital Course:  Patient is a 44 year old male with history of ESRD, was admitted with suspected inguinal hernia gangrene. General surgery was consulted and patient was taken to OR. Found to have colonic perforation due to mesh erosion with an abscess. Patient underwent sigmoid resection and drainage of the abscess, was started on clindamycin, Vancomycin and cefepime. Nephrology was consulted for chronic dialysis. AVF was not functioning, General surgery consulted, trialysis catheter placed. PRBC transfused during dialysis for low Hb 6.6.  While on broad-spectrum coverage, patient is spiking fevers and worsening leukocytosis, id consulted, discontinued clindamycin, vancomycin and cefepime-added daptomycin, Diflucan and Zosyn.  Repeated CT abdomen and pelvis that showed residual abscess/phlegmon and contained perforation.  Re-consulted surgery who mentioned likely post op changes. Fistulogram 9/24 by vascular surgery for declotting the AV fistula. Trialysis removed 9/25. Had worsening white count, fevers upto 103.5 and hypotension overnight 9/25.  Also had hemoglobin of 7, with worsening hypoxia up to 7 L (overnight desaturated to 70% with lethargy requiring BiPAP placement), after which nephrology recommended 1 unit PRBC transfusion.  Id, Nephrology, Urology, General surgery were updated about his worsening condition.We CT repeated that showed intraperitoneal free air and findings suggestive of necrotizing fasciitis, general surgery was consulted stat, patient was transferred to ICU.  The surgeon performed open laparotomy and noted fecal contamination of the left lower quadrant indicative of anastomotic disruption.  Colon was resected, and colostomy was created. Patient was placed on full liquid diet, was unable to tolerate and downgraded back to clear liquid diet.  Also complaining of excess mucus and cough.  Patient encouraged to use incentive spirometer as directed, and press pillow to abdomen when coughing.  Pulmonology signed off on 09/29 with recommendation to continue BiPAP nightly and with naps.  PT/OT recommended high-intensity therapy due to patient's deconditioning and discharge planning was started.  Pain regimen was adjusted.     Interval History: Patient seen and examined. Still struggling with pain control.  We will adjust the oral dilaudid. Repeat CT with some new fluid collections.  Discussed with IR- non-rim enhancing, so not sure this is abscess. IR to aspirate to see if purulent sanguinous drainage.    Discussed with Nephrology, id, surgery.  Will await IR results.    Heart rate much better today.  We will see how he is doing clinically and see if he is appropriate to transition to inpatient rehab tomorrow.    Review of Systems   Constitutional:  Negative for chills and fatigue.   Respiratory:  Negative for cough  and shortness of breath.    Cardiovascular:  Negative for chest pain and leg swelling.   Gastrointestinal:  Positive for abdominal pain. Negative for nausea and vomiting.     Objective:     Vital Signs (Most Recent):  Temp: 98.1 °F (36.7 °C) (10/03/24 0815)  Pulse: 73 (10/03/24 0815)  Resp: 18 (10/03/24 0815)  BP: (!) 108/56 (10/03/24 0815)  SpO2: 95 % (10/03/24 0815) Vital Signs (24h Range):  Temp:  [97.8 °F (36.6 °C)-99.8 °F (37.7 °C)] 98.1 °F (36.7 °C)  Pulse:  [] 73  Resp:  [16-20] 18  SpO2:  [93 %-100 %] 95 %  BP: (108-169)/(56-94) 108/56     Weight: 134 kg (295 lb 6.7 oz)  Body mass index is 37.93 kg/m².    Intake/Output Summary (Last 24 hours) at 10/3/2024 1146  Last data filed at 10/3/2024 1051  Gross per 24 hour   Intake 2140 ml   Output 4606 ml   Net -2466 ml         Physical Exam  Vitals and nursing note reviewed.   Constitutional:       Appearance: Normal appearance. He is obese.   HENT:      Head: Normocephalic and atraumatic.   Eyes:      Conjunctiva/sclera: Conjunctivae normal.      Pupils: Pupils are equal, round, and reactive to light.   Cardiovascular:      Rate and Rhythm: Normal rate and regular rhythm.   Pulmonary:      Effort: Pulmonary effort is normal.      Breath sounds: Normal breath sounds.   Abdominal:      General: Abdomen is flat. Bowel sounds are normal. There is no distension.      Palpations: Abdomen is soft.      Tenderness: There is abdominal tenderness.      Comments: Dressings CDI   Skin:     General: Skin is warm and dry.      Capillary Refill: Capillary refill takes less than 2 seconds.   Neurological:      General: No focal deficit present.      Mental Status: He is alert and oriented to person, place, and time. Mental status is at baseline.   Psychiatric:         Mood and Affect: Mood normal.             Significant Labs: All pertinent labs within the past 24 hours have been reviewed.  CBC:   Recent Labs   Lab 10/02/24  0558 10/03/24  0535   WBC 15.46* 15.81*   HGB 8.1*  8.5*   HCT 26.9* 27.8*   * 511*     CMP:   Recent Labs   Lab 10/02/24  0558 10/03/24  0535   * 131*   K 4.3 4.5   CL 94* 89*   CO2 19* 23   * 194*   BUN 53* 37*   CREATININE 15.2* 12.7*   CALCIUM 9.3 9.4   PROT 8.4 9.1*   ALBUMIN 3.3* 3.6   BILITOT 0.4 0.4   ALKPHOS 110 118   AST 31 31   ALT 32 35   ANIONGAP 20* 19*     Microbiology Results (last 7 days)       Procedure Component Value Units Date/Time    Culture, Body Fluid (Aerobic) w/ GS [[id number]] Collected: 10/03/24 1053    Order Status: No result Specimen: Body Fluid Updated: 10/03/24 1146    Culture, Anaerobic [[id number]] Collected: 10/03/24 1053    Order Status: Sent Specimen: Abscess from Abdomen Updated: 10/03/24 1116    Aerobic culture [[id number]] Collected: 10/03/24 1053    Order Status: Canceled Specimen: Body Fluid from Abdomen     Fungus culture [[id number]] Collected: 09/25/24 1413    Order Status: Completed Specimen: Incision site from Abdomen Updated: 10/03/24 0444     Fungus (Mycology) Culture No fungal growth to date    Culture, Anaerobe [[id number]]  (Abnormal) Collected: 09/25/24 1413    Order Status: Completed Specimen: Incision site from Abdomen Updated: 10/01/24 1418     Anaerobic Culture PARABACTEROIDES DISTASONIS  Few  Beta Lactamase positive      Blood culture [[id number]] Collected: 09/25/24 1111    Order Status: Completed Specimen: Blood from Peripheral, Antecubital, Right Updated: 09/30/24 1432     Blood Culture, Routine No growth after 5 days.    Narrative:      [id number]    Blood culture [[id number]] Collected: 09/25/24 1111    Order Status: Completed Specimen: Blood from Peripheral, Forearm, Right Updated: 09/30/24 1432     Blood Culture, Routine No growth after 5 days.    Narrative:      [id number]    Blood culture [[id number]] Collected: 09/24/24 1513    Order Status: Completed Specimen: Blood Updated: 09/29/24 1632     Blood Culture, Routine No growth after 5 days.    Narrative:      Collection has been rescheduled  by [email or web address] at 09/23/2024 14:35 Reason:   Patient unavailable dialysis   Collection has been rescheduled by MYB at 09/23/2024 18:44 Reason:   Unable to collect  Collection has been rescheduled by CZB at 09/23/2024 19:02 Reason:   Unable to collect  Collection has been rescheduled by RE1 at 09/24/2024 09:43 Reason:   Spoke to the patient's nurse about unable to collect she is   contacting Lambert and the doctor  Collection has been rescheduled by [email or web address] at 09/23/2024 14:35 Reason:   Patient unavailable dialysis   Collection has been rescheduled by MYB at 09/23/2024 18:44 Reason:   Unable to collect  Collection has been rescheduled by CZB at 09/23/2024 19:02 Reason:   Unable to collect  Collection has been rescheduled by RE1 at 09/24/2024 09:43 Reason:   Spoke to the patient's nurse about unable to collect she is   contacting Lambert and the doctor    Blood culture [[id number]] Collected: 09/24/24 1122    Order Status: Completed Specimen: Blood Updated: 09/29/24 1232     Blood Culture, Routine No growth after 5 days.    Narrative:      Collection has been rescheduled by [email or web address] at 09/23/2024 14:35 Reason:   Patient unavailable dialysis   Collection has been rescheduled by MYB at 09/23/2024 18:44 Reason:   Unable to collect  Collection has been rescheduled by CZB at 09/23/2024 19:02 Reason:   Unable to collect  Collection has been rescheduled by RE1 at 09/24/2024 09:43 Reason:   Spoke to the patient's nurse about unable to collect she is   contacting Lambert and the doctor  Collection has been rescheduled by [email or web address] at 09/23/2024 14:35 Reason:   Patient unavailable dialysis   Collection has been rescheduled by MYB at 09/23/2024 18:44 Reason:   Unable to collect  Collection has been rescheduled by CZB at 09/23/2024 19:02 Reason:   Unable to collect  Collection has been rescheduled by RE1 at 09/24/2024 09:43 Reason:   Spoke to the patient's nurse about unable to collect she is   contacting Lambert and the doctor    AFB Culture & Smear  [[id number]] Collected: 09/25/24 1413    Order Status: Completed Specimen: Incision site from Abdomen Updated: 09/28/24 1630     AFB CULTURE STAIN No acid fast bacilli seen.     AFB CULTURE STAIN Testing performed by:     AF[facility] CULTURE STAIN [facility] [place]     [facility] [address] [facility]     [place] [address]     AFB CULTURE STAIN Dr. Osbaldo Sherwood MD    IV catheter culture [[id number]] Collected: 09/25/24 1132    Order Status: Completed Specimen: Catheter Tip, Dialysis Updated: 09/28/24 1113     Aerobic Culture - Cath tip No growth    Narrative:      Trialysis    Aerobic culture [[id number]]  (Abnormal)  (Susceptibility) Collected: 09/24/24 1535    Order Status: Completed Specimen: Incision site from Groin Updated: 09/28/24 0738     Aerobic Bacterial Culture ESCHERICHIA COLI  From broth only      Narrative:      Left groin incision site drainage swab    Aerobic culture [[id number]]  (Abnormal)  (Susceptibility) Collected: 09/25/24 1413    Order Status: Completed Specimen: Incision site from Abdomen Updated: 09/28/24 0737     Aerobic Bacterial Culture ESCHERICHIA COLI  From broth only      Gram stain [[id number]] Collected: 09/25/24 1413    Order Status: Completed Specimen: Incision site from Abdomen Updated: 09/27/24 1530     Gram Stain Result Moderate WBC's      No organisms seen            Significant Imaging: I have reviewed all pertinent imaging results/findings within the past 24 hours.    Assessment/Plan:      * Inguinal hernia of left side with gangrene  W/ Colonic perforation with abscess  CT abdomen shows Left inguinal hernia containing fat and air as well as marked inflammation extending from inside the pelvis, in the hernia and down into the left scrotum.   S/P Robotic sigmoid resection, Mobilization of splenic flexure, left inguinal canal exploration, and I&D 9/17 per Dr. Connors with repeat OR visit for anastomotic leak on 9/25  Continue regular diet:  Started  9/30  Pain uncontrolled:  Oxycodone has not been effective.  We will adjust pain regimen to p.o. Dilaudid    Bleeding from the nose  -humidify the bipap  -Afrin BID  -Not acutely bleeding on my exam today  -Heparin SQ was held: will follow and resume if clinically appropriate.      Insomnia  Was started on  Xanax 0.25 mg q.h.s. PRN insomnia      Debility  Patient with Acute debility due to bed confinement status and prolonged hospitalization, and infection . Latest AMPAC and GEMS scores have not been reviewed. Plan includes progressive mobility protocol initated, PT/OT consulted, fall precautions in place, and CM following for discharge planning .  [facility]  Awaiting medical stability to be discharged    Orchitis  Urology consulted: no surgical/invasive intervention recommended  S/P left inguinal canal exploration with Penrose drain placed in the inguinal canal 9/25 per general surgeon.  Continue antibiotics per Infectious Disease    Sepsis  This patient does have evidence of infective focus  My overall impression is sepsis.  Source: Abdominal  Antibiotics given-   Antibiotics (72h ago, onward)      Start     Stop Route Frequency Ordered    09/27/24 1015  linezolid 600 mg/300 mL IVPB 600 mg         -- IV Every 12 hours (non-standard times) 09/27/24 0909    09/24/24 1800  piperacillin-tazobactam 4.5 g in dextrose 5 % 100 mL IVPB (ready to mix)         -- IV Every 12 hours (non-standard times) 09/24/24 1035        fluconazole (DIFLUCAN) IVPB 200 mg  Start Date/Time (Original Order): 09/22/24 1500   Ordered Dose: 200 mg Route: Intravenous Frequency: Every 24 hours (non-standard times)       Source control achieved by: IV antibiotics and surgical intervention  =======================================================  Blood culture 09/07/2024: NGTD   Urine culture 09/19/2024: NGTD   Blood culture 09/24/2024:  NGTD   Left groin drainage culture 09/24/2024:  E coli only resistant to tetracycline,  "cefazolin, ampicillin and ceftriaxone  Abdomen incision site culture 09/24/2024:  E coli only sensitive to ceftriaxone and tetracycline  =====================================================  WBC continues trending down   Infectious disease following   Continue current antibiotic regimen    Acute hypoxic respiratory failure  Patient with Hypoxic Respiratory failure which is Acute.  he is not on home oxygen. Supplemental oxygen was provided and noted- Oxygen Concentration (%):  [28] 28  Continue nightly BiPAP and supplemental O2, weaning as tolerated.  Pulmonology signed off 9/29    ABG  No results for input(s): "PH", "PO2", "PCO2", "HCO3", "BE" in the last 168 hours.  =================================  Had issues with some nasal bleeding/clotting.  -Bipap humidification; nightly.    Perforation of intestine  S/p open laparotomy with resection of affected colon and creation of colostomy 9/25  ID following: Continue IV Zyvox, IV Zosyn, and Diflucan  Continue mobilization as tolerated:  Out of bed to chair t.i.d. with meals  PT/OT following  Continue regular diet  Pain management  -Repeat CT on 10/2 with some new fluid collections- IR to see if abscess vs. Seroma.    Essential hypertension  Chronic, controlled. Latest blood pressure and vitals reviewed-     Temp:  [97.7 °F (36.5 °C)-99.2 °F (37.3 °C)]   Pulse:  []   Resp:  [16-20]   BP: (111-148)/(60-94)   SpO2:  [91 %-100 %] .   Home meds for hypertension were reviewed and noted below.   Hypertension Medications               cloNIDine (CATAPRES) 0.3 MG tablet TAKE 1 TABLET BY MOUTH THREE TIMES DAILY    hydrALAZINE (APRESOLINE) 100 MG tablet Take 1 tablet (100 mg total) by mouth 3 (three) times daily.    isosorbide mononitrate (IMDUR) 120 MG 24 hr tablet Take 120 mg by mouth once daily.    metoprolol succinate (TOPROL-XL) 50 MG 24 hr tablet Take 150 mg by mouth once daily.    olmesartan (BENICAR) 40 MG tablet Take 1 tablet by mouth once daily        While in the " hospital, will manage blood pressure as follows; Continue current antihypertensive regimen: hydralazine, losartan, clonidine, and metoprolol.  Patient was apparently getting nitro paste q.6 hours.  This was discontinued yesterday and his home isosorbide was resumed.    Will utilize p.r.n. blood pressure medication only if patient's blood pressure greater than 180/110 and he develops symptoms such as worsening chest pain or shortness of breath.    Sleep apnea  Currently getting BiPAP nightly while in the hospital per pulmonology    Pulmonary hypertension  Chronic.  No need to address acutely.    Severe obesity with body mass index (BMI) of 35.0 to 39.9 with comorbidity  Body mass index is 37.93 kg/m². Morbid obesity complicates all aspects of disease management from diagnostic modalities to treatment. Weight loss encouraged and health benefits explained to patient.     Anemia due to chronic kidney disease, on chronic dialysis  Anemia is likely due to acute blood loss which was from surgery and chronic disease due to ESRD. Most recent hemoglobin and hematocrit are listed below.  Recent Labs     09/29/24  1110 09/30/24  0627 10/01/24  0546   HGB 8.5* 7.4* 8.3*   HCT 27.4* 24.3* 28.1*     Plan  - Monitor serial CBC: Daily  - Transfuse PRBC if patient becomes hemodynamically unstable, symptomatic or H/H drops below 7/21.  - Patient has received 3 units of PRBCs  - Patient's anemia is currently stable    ESRD (end stage renal disease)  Creatine stable for now. BMP reviewed- noted Estimated Creatinine Clearance: 10.7 mL/min (A) (based on SCr of 13 mg/dL (H)). according to latest data. Based on current GFR, CKD stage is end stage.  Monitor UOP and serial BMP and adjust therapy as needed. Renally dose meds. Avoid nephrotoxic medications and procedures.  Nephrology following  Dialysis management per Nephrology  Renal diet      VTE Risk Mitigation (From admission, onward)           Ordered     heparin (porcine) injection 4,000  Units  As needed (PRN)         09/20/24 1544     IP VTE HIGH RISK PATIENT  Once         09/17/24 1031     Place sequential compression device  Until discontinued         09/17/24 1031                    Discharge Planning   MARIA ISABEL: 10/4/2024     Code Status: Full Code   Is the patient medically ready for discharge?:     Reason for patient still in hospital (select all that apply): Patient new problem, Patient trending condition, Treatment, and Consult recommendations  Discharge Plan A: Rehab   Discharge Delays: None known at this time              Myra Cummings NP  Department of Hospital Medicine   [facility]

## 2024-10-03 NOTE — PLAN OF CARE
Arrived to CT via w/c.  Id'd x 2 pt identifiers.  AAO x 3. JACOBS x4. Resp REU.   Denies nausea. All questions answered.

## 2024-10-03 NOTE — PROGRESS NOTES
[facility]   Department of Infectious Disease  Progress Note        PATIENT NAME: João Ham  MRN: [id number]  TODAY'S DATE: 10/03/2024  ADMIT DATE: 9/17/2024  LOS: 16 days    CHIEF COMPLAINT: Fatigue and Groin Swelling (X 2 days.  Hx of Kidney failure and CHF)      PRINCIPLE PROBLEM: Inguinal hernia of left side with gangrene    INTERVAL HISTORY      09/23/2024: Seen and evaluated at bedside.  No acute issues.  Leukocytosis persists.  Afebrile.      09/24/2024:  Leukocytosis.  Remains afebrile.  Fluid in BERNABE drain remains dark green.  Seen by vascular surgeon with plan for left upper extremity AV fistula fistulogram.    09/25/2024: Leukocytosis persists.  He is otherwise stable.  Left inguinal drainage fluid sent for Gram stain and culture.  G stain was negative.  Culture in progress.  He remains on broad-spectrum antimicrobials.  He was seen by urologist yesterday with plan for conservative management of left scrotal edema with orchitis.  Repeat CT this morning showed gas in the abdomen resident concern for anastomotic leak.     09/26/2024:  He had exploratory laparotomy yesterday with colon resection and end colostomy, mobilization of splenic flexure and left inguinal canal exploration.  A Penrose drain was placed in the inguinal canal.  Left IJ Vas-Cath was removed yesterday.    09/27/2024:  Leukocytosis.  Fever cough trending down.  Cultures from surgery so far negative.  Pain control better.    09/28/2024:  WBC back up to 24 K. cultures have grown E coli so far with 1 strain resistant to ceftriaxone and tetracycline.  Having nausea and is on clear liquid diet.  States he is Not feeling good.    09/29/2024:  He is feeling much better.  WBC down to 20 K.  Tolerating oral feeds.  KUB showed gas-filled bowel loops on the left side concerning for ileus.  Also showed lateral abdominal wall gas.      09/30/2024:  Continues to improve.  WBC down to 15 K.  No other acute issues overnight.    10/01/2024:  Seen and evaluated at bedside.  Continues to improve.  No acute issues overnight.  Tolerating oral feeds.    10/02/2024:  Penrose drain pulled from the left groin and scrotum..  Continues to improve.  Pending disposition.    10/03/2024: Her tachycardia yesterday.  CT abdomen and pelvis with IV contrast was performed 10/02/2024.  It showed a simple fluid collection beneath the left abdominal wall fascia and also a complex midline subcutaneous fluid collection.  Tolerating oral antibiotics okay.    Antibiotics (From admission, onward)      Start     Stop Route Frequency Ordered    10/02/24 1200  linezolid tablet 600 mg         -- Oral Every 12 hours 10/02/24 1052    09/24/24 1800  piperacillin-tazobactam 4.5 g in dextrose 5 % 100 mL IVPB (ready to mix)         -- IV Every 12 hours (non-standard times) 09/24/24 1035    09/17/24 2100  mupirocin 2 % ointment         09/22/24 2059 Nasl 2 times daily 09/17/24 1845          Antifungals (From admission, onward)      Start     Stop Route Frequency Ordered    10/03/24 0900  fluconazole tablet 100 mg         -- Oral Daily 10/02/24 1251    10/03/24 0800  ketoconazole 2 % shampoo         10/17/24 0759 Top Every Other Day 10/02/24 1052    10/02/24 1200  miconazole 2 % cream         10/16/24 0859 Top 2 times daily 10/02/24 1052           Antivirals (From admission, onward)      None            ASSESSMENT and PLAN      1. Incarcerated left inguinal hernia with necrosis and left inguinal abscess.  Had I&D of abscess, partial sigmoid colectomy with primary anastomosis on 09/17/2024.  Then had anastomotic leak and went back to surgery 09/25/2024 for laparotomy, colectomy and colostomy with of left inguinal area.  Continue antibiotics    2. Seborrheic dermatitis and seborrheic capitis.  Miconazole cream to face.  Nizoral shampoo to scalp and beard area.    3. Bilateral epididymo-orchitis worse on the left.  Resolving.  Antibiotics as above.    4. ESRD on hemodialysis Monday/  Wednesday/Friday.  Status post left upper extremity fistulogram.  He is on transplant list.     5. Abdominal fluid collections documented on new CT 10/02/2024.  Await input from general surgery.  Continue antibiotics as above.  May need drainage by IR.    RECOMMENDATIONS:    Continue current antimicrobial  Plan to treat for about 14 day from last surgery i.e. through 10/09/2024  Await input from general surgery on management of abdominal fluid collections documented on new CT.    Please send Epic secure chat with any questions.      SUBJECTIVE      Antibiotics   Vancomycin: 09/17/2024-09/20/2024   Clindamycin:  09/07/2020 04/09/2020 2/24   Cefepime: 09/07/2024-09/22/2024   Daptomycin: 09/22/2024-  Fluconazole: 09/22/2024-  Zosyn: 09/22/2024-    Microbiology  Blood culture 09/07/2024: NGTD   Urine culture 09/19/2024: NGTD   Blood culture 09/24/2024:  NGTD   Left groin drainage culture 09/24/2024:  E coli in broth only resistant to tetracycline, cefazolin, ampicillin and ceftriaxone  Abdomen incision site culture 09/24/2024:  E coli in broth only sensitive to ceftriaxone and tetracycline    Review of Systems  Negative except as stated above in Interval History     OBJECTIVE   Temp:  [97.8 °F (36.6 °C)-99.8 °F (37.7 °C)] 97.8 °F (36.6 °C)  Pulse:  [] 75  Resp:  [16-20] 18  SpO2:  [93 %-96 %] 96 %  BP: (108-192)/() 109/57  Temp:  [97.8 °F (36.6 °C)-99.8 °F (37.7 °C)]   Temp: 97.8 °F (36.6 °C) (10/03/24 0508)  Pulse: 75 (10/03/24 0508)  Resp: 18 (10/03/24 0526)  BP: (!) 109/57 (10/03/24 0508)  SpO2: 96 % (10/03/24 0508)    Intake/Output Summary (Last 24 hours) at 10/3/2024 0820  Last data filed at 10/3/2024 0539  Gross per 24 hour   Intake 2620 ml   Output 4805 ml   Net -2185 ml       Physical Exam  General:  Middle-aged man lying quietly in bed.  Looks much better today  Abdomen:  Distended.  Dry dressing midline.  Colostomy left lower abdomen..  Dressing left groin area with wound packing.  Genitals:  Firm  Edematous scrotum worse on the left.  Nontender  CVS: S1 and 2 heard, no murmurs appreciated   Respiratory: Clear to auscultation   Skin:  Dry scaly rash on face, beard area and scab.  Musculoskeletal system: No joint or bony abnormalities appreciated   CNS: No gross focal deficits  Psych: Good mood, normal affect.    VAD:  Right radial arterial line, PIV  ISOLATION:  None    WOUNDS:  Abdominal surgical wounds, open left groin wound with packing    Significant Labs: All pertinent labs within the past 24 hours have been reviewed.    CBC LAST 7 DAYS  Recent Labs   Lab 09/27/24  0359 09/28/24  0403 09/29/24  1110 09/30/24  0627 10/01/24  0546 10/02/24  0558 10/03/24  0535   WBC 20.31* 24.45* 19.88* 15.43* 15.59* 15.46* 15.81*   RBC 2.66* 2.87* 2.77* 2.51* 2.85* 2.72* 2.82*   HGB 7.9* 8.5* 8.5* 7.4* 8.3* 8.1* 8.5*   HCT 25.3* 27.7* 27.4* 24.3* 28.1* 26.9* 27.8*   MCV 95 97 99* 97 99* 99* 99*   MCH 29.7 29.6 30.7 29.5 29.1 29.8 30.1   MCHC 31.2* 30.7* 31.0* 30.5* 29.5* 30.1* 30.6*   RDW 23.1* 22.5* 22.7* 21.5* 21.5* 21.4* 21.1*   * 560* 560* 482* 505* 497* 511*   MPV 10.5 10.2 10.7 10.2 9.6 10.0 9.8   GRAN 73.0 76.0* 72.0 62.0 64.0 55.0 74.0*   LYMPH 6.0* 1.0* 11.0* 10.0* 10.0* 18.0  CANCELED 13.0*   MONO 7.0 5.0 7.0 11.0 15.0 17.0*  CANCELED 8.0   BASO  --   --   --   --   --  CANCELED  --    NRBC 0 0 0 1* 1* 0 0       CHEMISTRY LAST 7 DAYS  Recent Labs   Lab 09/27/24  0359 09/28/24  0403 09/30/24  0627 10/01/24  0546 10/02/24  0558 10/03/24  0535    137 134* 136 133* 131*   K 4.7 4.6 4.0 4.7 4.3 4.5   CL 97 97 94* 96 94* 89*   CO2 24 23 22* 23 19* 23   ANIONGAP 16 17* 18* 17* 20* 19*   BUN 33* 37* 63* 44* 53* 37*   CREATININE 10.3* 10.1* 15.5* 13.0* 15.2* 12.7*    112* 105 109 114* 194*   CALCIUM 9.0 9.7 8.7 9.4 9.3 9.4   MG 2.2 2.7* 2.5 2.5 2.4 2.3   ALBUMIN 2.9* 3.2* 3.0* 3.4* 3.3* 3.6   PROT 7.3 8.3 7.8 8.6* 8.4 9.1*   ALKPHOS 106 133 114 117 110 118   ALT 26 33 38 36 32 35   AST 36 54* 43* 34  "31 31   BILITOT 0.6 0.5 0.4 0.4 0.4 0.4       Estimated Creatinine Clearance: 10.9 mL/min (A) (based on SCr of 12.7 mg/dL (H)).    INFLAMMATORY/PROCAL  LAST 7 DAYS  No results for input(s): "PROCAL", "ESR", "CRP" in the last 168 hours.    No results found for: "ESR"  CRP   Date Value Ref Range Status   09/24/2024 35.70 (H) <1.00 mg/dL Final     Comment:     CRP-Normal Application expected values:   <1.0        mg/dL   Normal Range  1.0 - 5.0  mg/dL   Indicates mild inflammation  5.0 - 10.0 mg/dL   Indicates severe inflammation  >10.0        mg/dL   Represents serious processes and   frequently         indicates the presence of a bacterial   infection.      09/20/2024 >16.00 (H) <1.00 mg/dL Final     Comment:     CRP-Normal Application expected values:   <1.0        mg/dL   Normal Range  1.0 - 5.0  mg/dL   Indicates mild inflammation  5.0 - 10.0 mg/dL   Indicates severe inflammation  >10.0        mg/dL   Represents serious processes and   frequently         indicates the presence of a bacterial   infection.      07/16/2024 >16.00 (H) <1.00 mg/dL Final     Comment:     CRP-Normal Application expected values:   <1.0        mg/dL   Normal Range  1.0 - 5.0  mg/dL   Indicates mild inflammation  5.0 - 10.0 mg/dL   Indicates severe inflammation  >10.0        mg/dL   Represents serious processes and   frequently         indicates the presence of a bacterial   infection.          PRIOR  MICROBIOLOGY:    Susceptibility data from last 90 days.  Collected Specimen Info Organism Amp/Sulbactam Ampicillin Cefazolin Cefepime Ceftriaxone Ciprofloxacin Clindamycin Ertapenem Erythromycin Gentamicin Levofloxacin Meropenem Oxacillin Penicillin   09/25/24 Incision site from Abdomen Parabacteroides distasonis                 09/25/24 Incision site from Abdomen Escherichia coli  I  R  R  S  S  S   S   S  S  S     09/25/24 Blood from Peripheral, Antecubital, Right No growth after 5 days.                 09/25/24 Blood from Peripheral, " Forearm, Right No growth after 5 days.                 09/24/24 Incision site from Groin Escherichia coli  R  R  R  S  R  S   S   S  S  S     09/24/24 Blood No growth after 5 days.                 09/24/24 Blood No growth after 5 days.                 09/17/24 Blood from Peripheral, Antecubital, Right No growth after 5 days.                 09/17/24 Blood from Peripheral, Antecubital, Right No growth after 5 days.                 07/19/24 Catheter Tip, Tunneled Staphylococcus aureus      S   S   I     S  R   07/16/24 Blood from Peripheral, Forearm, Right Staphylococcus aureus                 07/15/24 Blood from Peripheral, Antecubital, Right Staphylococcus aureus      S   S   S     S  R   07/15/24 Blood from Peripheral, Hand, Right Staphylococcus aureus                   Collected Specimen Info Organism PIPERACILLIN/TAZO SUSCEPTIBILITY Tetracycline Tobramycin Trimeth/Sulfa Vancomycin   09/25/24 Incision site from Abdomen Parabacteroides distasonis        09/25/24 Incision site from Abdomen Escherichia coli  S  S  S  S    09/25/24 Blood from Peripheral, Antecubital, Right No growth after 5 days.        09/25/24 Blood from Peripheral, Forearm, Right No growth after 5 days.        09/24/24 Incision site from Groin Escherichia coli  S  R  S  S    09/24/24 Blood No growth after 5 days.        09/24/24 Blood No growth after 5 days.        09/17/24 Blood from Peripheral, Antecubital, Right No growth after 5 days.        09/17/24 Blood from Peripheral, Antecubital, Right No growth after 5 days.        07/19/24 Catheter Tip, Tunneled Staphylococcus aureus   S   S  S   07/16/24 Blood from Peripheral, Forearm, Right Staphylococcus aureus        07/15/24 Blood from Peripheral, Antecubital, Right Staphylococcus aureus   S   S  S   07/15/24 Blood from Peripheral, Hand, Right Staphylococcus aureus            LAST 7 DAYS MICROBIOLOGY   Microbiology Results (last 7 days)       Procedure Component Value Units Date/Time    Fungus  culture [[id number]] Collected: 09/25/24 1413    Order Status: Completed Specimen: Incision site from Abdomen Updated: 10/03/24 0444     Fungus (Mycology) Culture No fungal growth to date    Culture, Anaerobe [[id number]]  (Abnormal) Collected: 09/25/24 1413    Order Status: Completed Specimen: Incision site from Abdomen Updated: 10/01/24 1418     Anaerobic Culture PARABACTEROIDES DISTASONIS  Few  Beta Lactamase positive      Blood culture [[id number]] Collected: 09/25/24 1111    Order Status: Completed Specimen: Blood from Peripheral, Antecubital, Right Updated: 09/30/24 1432     Blood Culture, Routine No growth after 5 days.    Narrative:      [id number]    Blood culture [[id number]] Collected: 09/25/24 1111    Order Status: Completed Specimen: Blood from Peripheral, Forearm, Right Updated: 09/30/24 1432     Blood Culture, Routine No growth after 5 days.    Narrative:      [id number]    Blood culture [[id number]] Collected: 09/24/24 1513    Order Status: Completed Specimen: Blood Updated: 09/29/24 1632     Blood Culture, Routine No growth after 5 days.    Narrative:      Collection has been rescheduled by [email or web address] at 09/23/2024 14:35 Reason:   Patient unavailable dialysis   Collection has been rescheduled by MYB at 09/23/2024 18:44 Reason:   Unable to collect  Collection has been rescheduled by CZB at 09/23/2024 19:02 Reason:   Unable to collect  Collection has been rescheduled by RE1 at 09/24/2024 09:43 Reason:   Spoke to the patient's nurse about unable to collect she is   contacting Lambert and the doctor  Collection has been rescheduled by [email or web address] at 09/23/2024 14:35 Reason:   Patient unavailable dialysis   Collection has been rescheduled by MYB at 09/23/2024 18:44 Reason:   Unable to collect  Collection has been rescheduled by CZB at 09/23/2024 19:02 Reason:   Unable to collect  Collection has been rescheduled by RE1 at 09/24/2024 09:43 Reason:   Spoke to the patient's nurse about unable to collect she is   contacting Lambetr and  the doctor    Blood culture [[id number]] Collected: 09/24/24 1122    Order Status: Completed Specimen: Blood Updated: 09/29/24 1232     Blood Culture, Routine No growth after 5 days.    Narrative:      Collection has been rescheduled by [email or web address] at 09/23/2024 14:35 Reason:   Patient unavailable dialysis   Collection has been rescheduled by MYB at 09/23/2024 18:44 Reason:   Unable to collect  Collection has been rescheduled by CZB at 09/23/2024 19:02 Reason:   Unable to collect  Collection has been rescheduled by RE1 at 09/24/2024 09:43 Reason:   Spoke to the patient's nurse about unable to collect she is   contacting Lambert and the doctor  Collection has been rescheduled by [email or web address] at 09/23/2024 14:35 Reason:   Patient unavailable dialysis   Collection has been rescheduled by MYB at 09/23/2024 18:44 Reason:   Unable to collect  Collection has been rescheduled by CZB at 09/23/2024 19:02 Reason:   Unable to collect  Collection has been rescheduled by RE1 at 09/24/2024 09:43 Reason:   Spoke to the patient's nurse about unable to collect she is   contacting Lambert and the doctor    AFB Culture & Smear [[id number]] Collected: 09/25/24 1413    Order Status: Completed Specimen: Incision site from Abdomen Updated: 09/28/24 1630     AFB CULTURE STAIN No acid fast bacilli seen.     AFB CULTURE STAIN Testing performed by:     AFB CULTURE STAIN Lab [facility]     AFB CULTURE STAIN [address] [place] [address]     [place] [address]     AFB CULTURE STAIN Dr. Osbaldo Sherwood MD    IV catheter culture [[id number]] Collected: 09/25/24 1132    Order Status: Completed Specimen: Catheter Tip, Dialysis Updated: 09/28/24 1113     Aerobic Culture - Cath tip No growth    Narrative:      Trialysis    Aerobic culture [[id number]]  (Abnormal)  (Susceptibility) Collected: 09/24/24 1535    Order Status: Completed Specimen: Incision site from Groin Updated: 09/28/24 0738     Aerobic Bacterial Culture ESCHERICHIA COLI  From broth only       Narrative:      Left groin incision site drainage swab    Aerobic culture [[id number]]  (Abnormal)  (Susceptibility) Collected: 09/25/24 1413    Order Status: Completed Specimen: Incision site from Abdomen Updated: 09/28/24 0737     Aerobic Bacterial Culture ESCHERICHIA COLI  From broth only      Gram stain [[id number]] Collected: 09/25/24 1413    Order Status: Completed Specimen: Incision site from Abdomen Updated: 09/27/24 1530     Gram Stain Result Moderate WBC's      No organisms seen            CURRENT/PREVIOUS VISIT EKG  Results for orders placed or performed during the hospital encounter of 09/17/24   EKG 12-lead    Collection Time: 09/17/24  8:57 AM   Result Value Ref Range    QRS Duration 92 ms    [facility] QTC Calculation 469 ms    Narrative    Test Reason : N18.6,    Vent. Rate : 119 BPM     Atrial Rate : 119 BPM     P-R Int : 134 ms          QRS Dur : 092 ms      QT Int : 334 ms       P-R-T Axes : 027 -78 004 degrees     QTc Int : 469 ms    Sinus tachycardia  Left axis deviation  Cannot rule out Anterior infarct ,age undetermined  Abnormal ECG  When compared with ECG of 15-JUL-2024 17:47,  T wave inversion now evident in Inferior leads  T wave inversion no longer evident in Lateral leads  Confirmed by Jose Cruz MD ([id number]) on 9/22/2024 1:02:21 PM    Referred By: BRANDON   SELF           Confirmed By:Jose Cruz MD     Significant Imaging: I have reviewed all relevant and available imaging results/findings within the past 24 hours.    I spent a total of 45 minutes on the day of the visit.This includes face to face time and non-face to face time preparing to see the patient (eg, review of tests), obtaining and/or reviewing separately obtained history, documenting clinical information in the electronic or other health record, independently interpreting results and communicating results to the patient/family/caregiver, or care coordinator.    Kerwin Au MD  Date of Service: 10/03/2024      This  note was created using [employer] voice recognition software that occasionally misinterpreted phrases or words.

## 2024-10-03 NOTE — CARE UPDATE
10/03/24 0730   Patient Assessment/Suction   Level of Consciousness (AVPU) alert   Respiratory Effort Unlabored   Rhythm/Pattern, Respiratory nasal flaring;unlabored;pattern regular;depth regular;no shortness of breath reported   Cough Frequency no cough   Skin Integrity   $ Wound Care Tech Time 15 min   Area Observed Bridge of nose   Skin Appearance without discoloration   PRE-TX-O2   Device (Oxygen Therapy) room air   SpO2 100 %   Pulse Oximetry Type Intermittent   $ Pulse Oximetry - Multiple Charge Pulse Oximetry - Multiple   Pulse 70   Resp 18   Positioning HOB elevated 30 degrees   Preset CPAP/BiPAP Settings   Mode Of Delivery BiPAP S/T;Standby     No nasal flaring

## 2024-10-03 NOTE — PLAN OF CARE
Problem: Wound  Goal: Improved Oral Intake  Outcome: Progressing  Intervention: Promote and Optimize Oral Intake  Flowsheets (Taken 10/3/2024 1317)  Nutrition Interventions: supplemental drinks provided. Patient states he likes Ensure +HP vanilla. Provided diet office and RD contact numbers for menu choices.

## 2024-10-03 NOTE — CARE UPDATE
Nurse notified me of continued tachycardia (though improved after metoprolol which had not been received in 4 days), as well as low grade fever associated with a complaint of stabbing abdominal pain that was coming and going.  He is having lots of stool and gas in his ostomy.    I spoke with general surgery and my attending and decided to proceed with CT w/ IV and oral contrast (Cleared by nephro).  Surgery has no plans for repeat open surgical procedure-- ostomy is functioning well and plan is for continued IV antibiotic course, however agrees that if there is any focal, larger abscess then IR drainage could be pursued.

## 2024-10-03 NOTE — ASSESSMENT & PLAN NOTE
"Patient with Hypoxic Respiratory failure which is Acute.  he is not on home oxygen. Supplemental oxygen was provided and noted- Oxygen Concentration (%):  [28] 28  Continue nightly BiPAP and supplemental O2, weaning as tolerated.  Pulmonology signed off 9/29    ABG  No results for input(s): "PH", "PO2", "PCO2", "HCO3", "BE" in the last 168 hours.  =================================  Had issues with some nasal bleeding/clotting.  -Bipap humidification; nightly.  "

## 2024-10-03 NOTE — PLAN OF CARE
Tolerated well.  > 1cc of serosanguinous fluid aspirated and sent for cultures.  Bandaid to site-RUQ.  Denies nause or any Increase in pain.  Transported back to room via w/c.

## 2024-10-03 NOTE — PT/OT/SLP PROGRESS
Physical Therapy Treatment    Patient Name:  João Ham   MRN:  [id number]    Recommendations:     Discharge Recommendations: High Intensity Therapy  Discharge Equipment Recommendations: to be determined by next level of care  Barriers to discharge:  weakness, impaired endurance, impaired self care skills, impaired functional mobility, gait instability, pain, impaired activity tolerance.     Assessment:     João Ham is a 44 y.o. male admitted with a medical diagnosis of Inguinal hernia of left side with gangrene.  He presents with the following impairments/functional limitations: weakness, impaired endurance, impaired self care skills, impaired functional mobility, gait instability, impaired balance, pain.    Pt found resting with HOB elevated, agreeable to skilled physical therapy session today.     He required modA to transfer from supine to sitting EOB. He was able to scoot with SBA to place feet on floor for upcoming transfer. He performed sit to stand with RW and CGA. Verbal cues required to demonstrate proper upright standing posture prior to ambulation.     He ambulated two bouts of 100ft with RW and CGA. He required occasional standing rest breaks throughout gait training secondary to fatigue. He ambulated one bout of 100ft without AD and with CGA. Throughout all ambulation, pt demonstrated antalgic gait pattern as well as general gait instability requiring verbal cues for correction as well as to stay attended to task.     He ambulated back to his room where he transferred from sitting to supine with CGA. Pt left with HOB elevated, bed alarm on, call light within reach, all needs met, and RN notified.     Rehab Prognosis: Fair; patient would benefit from acute skilled PT services to address these deficits and reach maximum level of function.    Recent Surgery: Procedure(s) (LRB):  LAPAROTOMY, EXPLORATORY (N/A)  COLON RESECTION  MOBILIZATION, SPLENIC FLEXURE 8 Days Post-Op    Plan:     During this  hospitalization, patient to be seen daily to address the identified rehab impairments via therapeutic activities, therapeutic exercises, gait training and progress toward the following goals:    Plan of Care Expires:  10/26/24    Subjective     Chief Complaint: fatigue  Patient/Family Comments/goals: to get better and go home  Pain/Comfort:  Location - Orientation 1: generalized  Location 1: abdomen      Objective:     Communicated with RN prior to session.  Patient found HOB elevated with telemetry, BERNABE drain, colostomy, peripheral IV upon PT entry to room.     General Precautions: Standard, fall  Orthopedic Precautions:    Braces:    Respiratory Status: Room air     Functional Mobility:  Bed Mobility:     Supine to Sit: moderate assistance  Sit to Supine: contact guard assistance  Transfers:     Sit to Stand:  contact guard assistance with rolling walker  Gait: 2a261sc with RW and without AD for 100ft with CGA       AM-PAC 6 CLICK MOBILITY          Treatment & Education:  Pt educated on importance of time OOB, importance of intermittent mobility, safe techniques for transfers/ambulation, discharge recommendations/options, and use of call light for assistance and fall prevention.      Patient left HOB elevated with all lines intact, call button in reach, bed alarm on, and RN notified..    GOALS:   Multidisciplinary Problems       Physical Therapy Goals          Problem: Physical Therapy    Goal Priority Disciplines Outcome Interventions   Physical Therapy Goal     PT, PT/OT Progressing    Description: 1. Supine to sit with Stand-by Assistance  2. Sit to stand transfer with Stand-by Assistance  3.. Bed to chair transfer with Supervision using Rolling Walker  4. Gait  x 100 feet with Minimal Assistance using least restrictive assistive device.                          Time Tracking:     PT Received On: 10/03/24  PT Start Time: 1456     PT Stop Time: 1512  PT Total Time (min): 16 min     Billable Minutes: Gait Training  16    Treatment Type: Treatment  PT/PTA: PTA     Number of PTA visits since last PT visit: 4     10/03/2024

## 2024-10-03 NOTE — PLAN OF CARE
Problem: Adult Inpatient Plan of Care  Goal: Plan of Care Review  Outcome: Progressing  Flowsheets (Taken 10/3/2024 1622)  Plan of Care Reviewed With: patient  Goal: Optimal Comfort and Wellbeing  Outcome: Progressing     Problem: Wound  Goal: Optimal Pain Control and Function  Outcome: Progressing  Goal: Skin Health and Integrity  Outcome: Progressing  Goal: Optimal Wound Healing  Outcome: Progressing  Intervention: Promote Wound Healing  Flowsheets (Taken 10/3/2024 1622)  Sleep/Rest Enhancement:   relaxation techniques promoted   regular sleep/rest pattern promoted   room darkened   natural light exposure provided   noise level reduced     Problem: Infection  Goal: Absence of Infection Signs and Symptoms  Outcome: Progressing  Intervention: Prevent or Manage Infection  Flowsheets (Taken 10/3/2024 1622)  Fever Reduction/Comfort Measures: fluid intake increased  Infection Management: aseptic technique maintained     Problem: Skin Injury Risk Increased  Goal: Skin Health and Integrity  Outcome: Progressing

## 2024-10-03 NOTE — SUBJECTIVE & OBJECTIVE
Interval History: Patient seen and examined. Still struggling with pain control.  We will adjust the oral dilaudid. Repeat CT with some new fluid collections.  Discussed with IR- non-rim enhancing, so not sure this is abscess. IR to aspirate to see if purulent sanguinous drainage.    Discussed with Nephrology, id, surgery.  Will await IR results.    Heart rate much better today.  We will see how he is doing clinically and see if he is appropriate to transition to inpatient rehab tomorrow.    Review of Systems   Constitutional:  Negative for chills and fatigue.   Respiratory:  Negative for cough and shortness of breath.    Cardiovascular:  Negative for chest pain and leg swelling.   Gastrointestinal:  Positive for abdominal pain. Negative for nausea and vomiting.     Objective:     Vital Signs (Most Recent):  Temp: 98.1 °F (36.7 °C) (10/03/24 0815)  Pulse: 73 (10/03/24 0815)  Resp: 18 (10/03/24 0815)  BP: (!) 108/56 (10/03/24 0815)  SpO2: 95 % (10/03/24 0815) Vital Signs (24h Range):  Temp:  [97.8 °F (36.6 °C)-99.8 °F (37.7 °C)] 98.1 °F (36.7 °C)  Pulse:  [] 73  Resp:  [16-20] 18  SpO2:  [93 %-100 %] 95 %  BP: (108-169)/(56-94) 108/56     Weight: 134 kg (295 lb 6.7 oz)  Body mass index is 37.93 kg/m².    Intake/Output Summary (Last 24 hours) at 10/3/2024 1146  Last data filed at 10/3/2024 1051  Gross per 24 hour   Intake 2140 ml   Output 4606 ml   Net -2466 ml         Physical Exam  Vitals and nursing note reviewed.   Constitutional:       Appearance: Normal appearance. He is obese.   HENT:      Head: Normocephalic and atraumatic.   Eyes:      Conjunctiva/sclera: Conjunctivae normal.      Pupils: Pupils are equal, round, and reactive to light.   Cardiovascular:      Rate and Rhythm: Normal rate and regular rhythm.   Pulmonary:      Effort: Pulmonary effort is normal.      Breath sounds: Normal breath sounds.   Abdominal:      General: Abdomen is flat. Bowel sounds are normal. There is no distension.       Palpations: Abdomen is soft.      Tenderness: There is abdominal tenderness.      Comments: Dressings CDI   Skin:     General: Skin is warm and dry.      Capillary Refill: Capillary refill takes less than 2 seconds.   Neurological:      General: No focal deficit present.      Mental Status: He is alert and oriented to person, place, and time. Mental status is at baseline.   Psychiatric:         Mood and Affect: Mood normal.             Significant Labs: All pertinent labs within the past 24 hours have been reviewed.  CBC:   Recent Labs   Lab 10/02/24  0558 10/03/24  0535   WBC 15.46* 15.81*   HGB 8.1* 8.5*   HCT 26.9* 27.8*   * 511*     CMP:   Recent Labs   Lab 10/02/24  0558 10/03/24  0535   * 131*   K 4.3 4.5   CL 94* 89*   CO2 19* 23   * 194*   BUN 53* 37*   CREATININE 15.2* 12.7*   CALCIUM 9.3 9.4   PROT 8.4 9.1*   ALBUMIN 3.3* 3.6   BILITOT 0.4 0.4   ALKPHOS 110 118   AST 31 31   ALT 32 35   ANIONGAP 20* 19*     Microbiology Results (last 7 days)       Procedure Component Value Units Date/Time    Culture, Body Fluid (Aerobic) w/ GS [[id number]] Collected: 10/03/24 1053    Order Status: No result Specimen: Body Fluid Updated: 10/03/24 1146    Culture, Anaerobic [[id number]] Collected: 10/03/24 1053    Order Status: Sent Specimen: Abscess from Abdomen Updated: 10/03/24 1116    Aerobic culture [[id number]] Collected: 10/03/24 1053    Order Status: Canceled Specimen: Body Fluid from Abdomen     Fungus culture [[id number]] Collected: 09/25/24 1413    Order Status: Completed Specimen: Incision site from Abdomen Updated: 10/03/24 0444     Fungus (Mycology) Culture No fungal growth to date    Culture, Anaerobe [[id number]]  (Abnormal) Collected: 09/25/24 1413    Order Status: Completed Specimen: Incision site from Abdomen Updated: 10/01/24 1418     Anaerobic Culture PARABACTEROIDES DISTASONIS  Few  Beta Lactamase positive      Blood culture [[id number]] Collected: 09/25/24 1111    Order Status:  Completed Specimen: Blood from Peripheral, Antecubital, Right Updated: 09/30/24 1432     Blood Culture, Routine No growth after 5 days.    Narrative:      [id number]    Blood culture [[id number]] Collected: 09/25/24 1111    Order Status: Completed Specimen: Blood from Peripheral, Forearm, Right Updated: 09/30/24 1432     Blood Culture, Routine No growth after 5 days.    Narrative:      [id number]    Blood culture [[id number]] Collected: 09/24/24 1513    Order Status: Completed Specimen: Blood Updated: 09/29/24 1632     Blood Culture, Routine No growth after 5 days.    Narrative:      Collection has been rescheduled by [email or web address] at 09/23/2024 14:35 Reason:   Patient unavailable dialysis   Collection has been rescheduled by MYB at 09/23/2024 18:44 Reason:   Unable to collect  Collection has been rescheduled by CZB at 09/23/2024 19:02 Reason:   Unable to collect  Collection has been rescheduled by RE1 at 09/24/2024 09:43 Reason:   Spoke to the patient's nurse about unable to collect she is   contacting Lambert and the doctor  Collection has been rescheduled by [email or web address] at 09/23/2024 14:35 Reason:   Patient unavailable dialysis   Collection has been rescheduled by MYB at 09/23/2024 18:44 Reason:   Unable to collect  Collection has been rescheduled by CZB at 09/23/2024 19:02 Reason:   Unable to collect  Collection has been rescheduled by RE1 at 09/24/2024 09:43 Reason:   Spoke to the patient's nurse about unable to collect she is   contacting [facility] and the doctor    Blood culture [[id number]] Collected: 09/24/24 1122    Order Status: Completed Specimen: Blood Updated: 09/29/24 1232     Blood Culture, Routine No growth after 5 days.    Narrative:      Collection has been rescheduled by [email or web address] at 09/23/2024 14:35 Reason:   Patient unavailable dialysis   Collection has been rescheduled by MYB at 09/23/2024 18:44 Reason:   Unable to collect  Collection has been rescheduled by CZB at 09/23/2024 19:02 Reason:   Unable to collect  Collection has been  rescheduled by RE1 at 09/24/2024 09:43 Reason:   Spoke to the patient's nurse about unable to collect she is   contacting Lambert and the doctor  Collection has been rescheduled by [email or web address] at 09/23/2024 14:35 Reason:   Patient unavailable dialysis   Collection has been rescheduled by MYB at 09/23/2024 18:44 Reason:   Unable to collect  Collection has been rescheduled by CZB at 09/23/2024 19:02 Reason:   Unable to collect  Collection has been rescheduled by RE1 at 09/24/2024 09:43 Reason:   Spoke to the patient's nurse about unable to collect she is   contacting [facility] and the doctor    AFB Culture & Smear [[id number]] Collected: 09/25/24 1413    Order Status: Completed Specimen: Incision site from Abdomen Updated: 09/28/24 1630     AFB CULTURE STAIN No acid fast bacilli seen.     AFB CULTURE STAIN Testing performed by:     AFB CULTURE STAIN Lab [facility]     AFB CULTURE STAIN [address] [place] [address]     [place] [address]     AFB CULTURE STAIN Dr. Osbaldo Sherwood MD    IV catheter culture [[id number]] Collected: 09/25/24 1132    Order Status: Completed Specimen: Catheter Tip, Dialysis Updated: 09/28/24 1113     Aerobic Culture - Cath tip No growth    Narrative:      Trialysis    Aerobic culture [[id number]]  (Abnormal)  (Susceptibility) Collected: 09/24/24 1535    Order Status: Completed Specimen: Incision site from Groin Updated: 09/28/24 0738     Aerobic Bacterial Culture ESCHERICHIA COLI  From broth only      Narrative:      Left groin incision site drainage swab    Aerobic culture [[id number]]  (Abnormal)  (Susceptibility) Collected: 09/25/24 1413    Order Status: Completed Specimen: Incision site from Abdomen Updated: 09/28/24 0737     Aerobic Bacterial Culture ESCHERICHIA COLI  From broth only      Gram stain [[id number]] Collected: 09/25/24 1413    Order Status: Completed Specimen: Incision site from Abdomen Updated: 09/27/24 1530     Gram Stain Result Moderate WBC's      No organisms seen             Significant Imaging: I have reviewed all pertinent imaging results/findings within the past 24 hours.

## 2024-10-04 LAB
ALBUMIN SERPL BCP-MCNC: 3.4 G/DL (ref 3.5–5.2)
ALP SERPL-CCNC: 91 U/L (ref 55–135)
ALT SERPL W/O P-5'-P-CCNC: 25 U/L (ref 10–44)
ANION GAP SERPL CALC-SCNC: 19 MMOL/L (ref 8–16)
AST SERPL-CCNC: 21 U/L (ref 10–40)
BASOPHILS # BLD AUTO: 0.04 K/UL (ref 0–0.2)
BASOPHILS NFR BLD: 0.4 % (ref 0–1.9)
BILIRUB SERPL-MCNC: 0.4 MG/DL (ref 0.1–1)
BUN SERPL-MCNC: 53 MG/DL (ref 6–20)
CALCIUM SERPL-MCNC: 9.5 MG/DL (ref 8.7–10.5)
CHLORIDE SERPL-SCNC: 89 MMOL/L (ref 95–110)
CO2 SERPL-SCNC: 22 MMOL/L (ref 23–29)
CREAT SERPL-MCNC: 15 MG/DL (ref 0.5–1.4)
CRP SERPL-MCNC: 17 MG/DL
DIFFERENTIAL METHOD BLD: ABNORMAL
EOSINOPHIL # BLD AUTO: 0.2 K/UL (ref 0–0.5)
EOSINOPHIL NFR BLD: 2.1 % (ref 0–8)
ERYTHROCYTE [DISTWIDTH] IN BLOOD BY AUTOMATED COUNT: 21.1 % (ref 11.5–14.5)
ERYTHROCYTE [SEDIMENTATION RATE] IN BLOOD BY WESTERGREN METHOD: 47 MM/HR (ref 0–10)
EST. GFR  (NO RACE VARIABLE): 3.7 ML/MIN/1.73 M^2
GLUCOSE SERPL-MCNC: 106 MG/DL (ref 70–110)
HCT VFR BLD AUTO: 25.1 % (ref 40–54)
HGB BLD-MCNC: 7.7 G/DL (ref 14–18)
IMM GRANULOCYTES # BLD AUTO: 0.24 K/UL (ref 0–0.04)
IMM GRANULOCYTES NFR BLD AUTO: 2.2 % (ref 0–0.5)
LYMPHOCYTES # BLD AUTO: 1.3 K/UL (ref 1–4.8)
LYMPHOCYTES NFR BLD: 12 % (ref 18–48)
MAGNESIUM SERPL-MCNC: 2.3 MG/DL (ref 1.6–2.6)
MCH RBC QN AUTO: 29.3 PG (ref 27–31)
MCHC RBC AUTO-ENTMCNC: 30.7 G/DL (ref 32–36)
MCV RBC AUTO: 95 FL (ref 82–98)
MONOCYTES # BLD AUTO: 1.4 K/UL (ref 0.3–1)
MONOCYTES NFR BLD: 12.6 % (ref 4–15)
NEUTROPHILS # BLD AUTO: 7.8 K/UL (ref 1.8–7.7)
NEUTROPHILS NFR BLD: 70.7 % (ref 38–73)
NRBC BLD-RTO: 0 /100 WBC
PLATELET # BLD AUTO: 448 K/UL (ref 150–450)
PMV BLD AUTO: 9.7 FL (ref 9.2–12.9)
POCT GLUCOSE: 134 MG/DL (ref 70–110)
POCT GLUCOSE: 141 MG/DL (ref 70–110)
POTASSIUM SERPL-SCNC: 4.4 MMOL/L (ref 3.5–5.1)
PROT SERPL-MCNC: 8.4 G/DL (ref 6–8.4)
RBC # BLD AUTO: 2.63 M/UL (ref 4.6–6.2)
SODIUM SERPL-SCNC: 130 MMOL/L (ref 136–145)
WBC # BLD AUTO: 10.97 K/UL (ref 3.9–12.7)

## 2024-10-04 PROCEDURE — 83735 ASSAY OF MAGNESIUM: CPT | Performed by: SURGERY

## 2024-10-04 PROCEDURE — 99233 SBSQ HOSP IP/OBS HIGH 50: CPT | Mod: ,,, | Performed by: INTERNAL MEDICINE

## 2024-10-04 PROCEDURE — 25000003 PHARM REV CODE 250: Performed by: NURSE PRACTITIONER

## 2024-10-04 PROCEDURE — 80053 COMPREHEN METABOLIC PANEL: CPT | Performed by: SURGERY

## 2024-10-04 PROCEDURE — 63600175 PHARM REV CODE 636 W HCPCS: Mod: JG | Performed by: INTERNAL MEDICINE

## 2024-10-04 PROCEDURE — 12000002 HC ACUTE/MED SURGE SEMI-PRIVATE ROOM

## 2024-10-04 PROCEDURE — 25000003 PHARM REV CODE 250: Performed by: INTERNAL MEDICINE

## 2024-10-04 PROCEDURE — 36415 COLL VENOUS BLD VENIPUNCTURE: CPT | Performed by: SURGERY

## 2024-10-04 PROCEDURE — 25000003 PHARM REV CODE 250

## 2024-10-04 PROCEDURE — 90935 HEMODIALYSIS ONE EVALUATION: CPT

## 2024-10-04 PROCEDURE — 94761 N-INVAS EAR/PLS OXIMETRY MLT: CPT

## 2024-10-04 PROCEDURE — 86140 C-REACTIVE PROTEIN: CPT | Performed by: NURSE PRACTITIONER

## 2024-10-04 PROCEDURE — 85651 RBC SED RATE NONAUTOMATED: CPT | Performed by: NURSE PRACTITIONER

## 2024-10-04 PROCEDURE — 63600175 PHARM REV CODE 636 W HCPCS: Performed by: NURSE PRACTITIONER

## 2024-10-04 PROCEDURE — 63600175 PHARM REV CODE 636 W HCPCS: Performed by: SURGERY

## 2024-10-04 PROCEDURE — 94660 CPAP INITIATION&MGMT: CPT

## 2024-10-04 PROCEDURE — 85025 COMPLETE CBC W/AUTO DIFF WBC: CPT | Performed by: SURGERY

## 2024-10-04 PROCEDURE — 63700000 PHARM REV CODE 250 ALT 637 W/O HCPCS: Performed by: INTERNAL MEDICINE

## 2024-10-04 PROCEDURE — 25000003 PHARM REV CODE 250: Performed by: SURGERY

## 2024-10-04 RX ORDER — KETOCONAZOLE 20 MG/ML
SHAMPOO, SUSPENSION TOPICAL EVERY OTHER DAY
Status: ON HOLD
Start: 2024-10-05

## 2024-10-04 RX ORDER — OXYCODONE HCL 10 MG/1
10 TABLET, FILM COATED, EXTENDED RELEASE ORAL EVERY 12 HOURS
Status: DISCONTINUED | OUTPATIENT
Start: 2024-10-04 | End: 2024-10-06 | Stop reason: HOSPADM

## 2024-10-04 RX ORDER — FLUCONAZOLE 100 MG/1
100 TABLET ORAL DAILY
Start: 2024-10-05 | End: 2024-10-09

## 2024-10-04 RX ORDER — HYDROMORPHONE HYDROCHLORIDE 4 MG/1
4 TABLET ORAL EVERY 4 HOURS PRN
Status: ON HOLD
Start: 2024-10-04

## 2024-10-04 RX ORDER — HEPARIN SODIUM 1000 [USP'U]/ML
4000 INJECTION, SOLUTION INTRAVENOUS; SUBCUTANEOUS
Status: ON HOLD
Start: 2024-10-04

## 2024-10-04 RX ORDER — CALCITRIOL 0.25 UG/1
0.25 CAPSULE ORAL DAILY
Status: ON HOLD
Start: 2024-10-04

## 2024-10-04 RX ORDER — CLONIDINE HYDROCHLORIDE 0.3 MG/1
0.3 TABLET ORAL 3 TIMES DAILY PRN
Status: ON HOLD
Start: 2024-10-04 | End: 2025-10-04

## 2024-10-04 RX ORDER — OXYCODONE HCL 10 MG/1
10 TABLET, FILM COATED, EXTENDED RELEASE ORAL EVERY 12 HOURS
Start: 2024-10-04 | End: 2024-10-25 | Stop reason: DRUGHIGH

## 2024-10-04 RX ORDER — HYDRALAZINE HYDROCHLORIDE 100 MG/1
100 TABLET, FILM COATED ORAL EVERY 8 HOURS
Start: 2024-10-04 | End: 2024-10-25 | Stop reason: SDUPTHER

## 2024-10-04 RX ORDER — TALC
6 POWDER (GRAM) TOPICAL NIGHTLY PRN
Status: ON HOLD
Start: 2024-10-04

## 2024-10-04 RX ORDER — ACETAMINOPHEN 325 MG/1
650 TABLET ORAL EVERY 4 HOURS PRN
Status: ON HOLD
Start: 2024-10-04

## 2024-10-04 RX ORDER — CALCIUM CARBONATE 200(500)MG
1000 TABLET,CHEWABLE ORAL 3 TIMES DAILY
Qty: 180 TABLET | Refills: 11 | Status: ON HOLD | OUTPATIENT
Start: 2024-10-04 | End: 2025-10-04

## 2024-10-04 RX ORDER — GABAPENTIN 100 MG/1
100 CAPSULE ORAL 3 TIMES DAILY
Status: ON HOLD
Start: 2024-10-04 | End: 2025-10-04

## 2024-10-04 RX ORDER — ISOSORBIDE MONONITRATE 30 MG/1
30 TABLET, EXTENDED RELEASE ORAL DAILY
Status: ON HOLD
Start: 2024-10-05 | End: 2025-10-05

## 2024-10-04 RX ORDER — SIMETHICONE 80 MG
80 TABLET,CHEWABLE ORAL 3 TIMES DAILY PRN
Status: ON HOLD
Start: 2024-10-04

## 2024-10-04 RX ORDER — LIDOCAINE AND PRILOCAINE 25; 25 MG/G; MG/G
CREAM TOPICAL
Status: ON HOLD
Start: 2024-10-04

## 2024-10-04 RX ORDER — ALUMINUM HYDROXIDE, MAGNESIUM HYDROXIDE, AND SIMETHICONE 1200; 120; 1200 MG/30ML; MG/30ML; MG/30ML
30 SUSPENSION ORAL 4 TIMES DAILY PRN
Status: ON HOLD
Start: 2024-10-04 | End: 2025-10-04

## 2024-10-04 RX ORDER — ALPRAZOLAM 0.25 MG/1
0.25 TABLET ORAL NIGHTLY PRN
Status: ON HOLD
Start: 2024-10-04 | End: 2024-11-03

## 2024-10-04 RX ORDER — ONDANSETRON 4 MG/1
4 TABLET, ORALLY DISINTEGRATING ORAL EVERY 6 HOURS PRN
Status: ON HOLD
Start: 2024-10-04

## 2024-10-04 RX ORDER — HEPARIN SODIUM 5000 [USP'U]/ML
5000 INJECTION, SOLUTION INTRAVENOUS; SUBCUTANEOUS EVERY 8 HOURS
Status: DISCONTINUED | OUTPATIENT
Start: 2024-10-04 | End: 2024-10-06 | Stop reason: HOSPADM

## 2024-10-04 RX ORDER — INSULIN ASPART 100 [IU]/ML
0-5 INJECTION, SOLUTION INTRAVENOUS; SUBCUTANEOUS
Status: ON HOLD
Start: 2024-10-04 | End: 2025-10-04

## 2024-10-04 RX ORDER — CLONIDINE HYDROCHLORIDE 0.1 MG/1
0.3 TABLET ORAL 3 TIMES DAILY PRN
Status: DISCONTINUED | OUTPATIENT
Start: 2024-10-04 | End: 2024-10-06 | Stop reason: HOSPADM

## 2024-10-04 RX ORDER — HEPARIN SODIUM 5000 [USP'U]/ML
5000 INJECTION, SOLUTION INTRAVENOUS; SUBCUTANEOUS EVERY 8 HOURS
Status: ON HOLD
Start: 2024-10-04

## 2024-10-04 RX ORDER — DOXYLAMINE SUCCINATE 25 MG
TABLET ORAL 2 TIMES DAILY
Status: ON HOLD
Start: 2024-10-04

## 2024-10-04 RX ORDER — LINEZOLID 600 MG/1
600 TABLET, FILM COATED ORAL EVERY 12 HOURS
Start: 2024-10-04 | End: 2024-10-09

## 2024-10-04 RX ADMIN — LINEZOLID 600 MG: 600 TABLET, FILM COATED ORAL at 07:10

## 2024-10-04 RX ADMIN — SEVELAMER CARBONATE 1600 MG: 800 TABLET, FILM COATED ORAL at 10:10

## 2024-10-04 RX ADMIN — GABAPENTIN 100 MG: 100 CAPSULE ORAL at 03:10

## 2024-10-04 RX ADMIN — HYDROMORPHONE HYDROCHLORIDE 4 MG: 2 TABLET ORAL at 03:10

## 2024-10-04 RX ADMIN — ONDANSETRON 4 MG: 2 INJECTION INTRAMUSCULAR; INTRAVENOUS at 07:10

## 2024-10-04 RX ADMIN — FLUCONAZOLE 100 MG: 100 TABLET ORAL at 07:10

## 2024-10-04 RX ADMIN — Medication 2 SPRAY: at 11:10

## 2024-10-04 RX ADMIN — LOSARTAN POTASSIUM 100 MG: 50 TABLET, FILM COATED ORAL at 07:10

## 2024-10-04 RX ADMIN — OXYCODONE HYDROCHLORIDE 10 MG: 10 TABLET, FILM COATED, EXTENDED RELEASE ORAL at 10:10

## 2024-10-04 RX ADMIN — GABAPENTIN 100 MG: 100 CAPSULE ORAL at 07:10

## 2024-10-04 RX ADMIN — HEPARIN SODIUM 5000 UNITS: 5000 INJECTION, SOLUTION INTRAVENOUS; SUBCUTANEOUS at 03:10

## 2024-10-04 RX ADMIN — HYDRALAZINE HYDROCHLORIDE 100 MG: 25 TABLET ORAL at 05:10

## 2024-10-04 RX ADMIN — SEVELAMER CARBONATE 1600 MG: 800 TABLET, FILM COATED ORAL at 03:10

## 2024-10-04 RX ADMIN — HYDRALAZINE HYDROCHLORIDE 100 MG: 25 TABLET ORAL at 03:10

## 2024-10-04 RX ADMIN — PIPERACILLIN SODIUM AND TAZOBACTAM SODIUM 4.5 G: 4; .5 INJECTION, POWDER, LYOPHILIZED, FOR SOLUTION INTRAVENOUS at 03:10

## 2024-10-04 RX ADMIN — MICONAZOLE NITRATE: 20 CREAM TOPICAL at 11:10

## 2024-10-04 RX ADMIN — SEVELAMER CARBONATE 1600 MG: 800 TABLET, FILM COATED ORAL at 07:10

## 2024-10-04 RX ADMIN — METOPROLOL SUCCINATE 150 MG: 50 TABLET, FILM COATED, EXTENDED RELEASE ORAL at 07:10

## 2024-10-04 RX ADMIN — PIPERACILLIN SODIUM AND TAZOBACTAM SODIUM 4.5 G: 4; .5 INJECTION, POWDER, LYOPHILIZED, FOR SOLUTION INTRAVENOUS at 02:10

## 2024-10-04 RX ADMIN — HYDROMORPHONE HYDROCHLORIDE 4 MG: 2 TABLET ORAL at 07:10

## 2024-10-04 RX ADMIN — CLONIDINE HYDROCHLORIDE 0.3 MG: 0.1 TABLET ORAL at 07:10

## 2024-10-04 RX ADMIN — ISOSORBIDE MONONITRATE 30 MG: 30 TABLET, EXTENDED RELEASE ORAL at 07:10

## 2024-10-04 RX ADMIN — EPOETIN ALFA-EPBX 13400 UNITS: 10000 INJECTION, SOLUTION INTRAVENOUS; SUBCUTANEOUS at 10:10

## 2024-10-04 NOTE — PROGRESS NOTES
[facility]   Department of Infectious Disease  Progress Note        PATIENT NAME: João Ham  MRN: [id number]  TODAY'S DATE: 10/04/2024  ADMIT DATE: 9/17/2024  LOS: 17 days    CHIEF COMPLAINT: Fatigue and Groin Swelling (X 2 days.  Hx of Kidney failure and CHF)      PRINCIPLE PROBLEM: Inguinal hernia of left side with gangrene    INTERVAL HISTORY      09/23/2024: Seen and evaluated at bedside.  No acute issues.  Leukocytosis persists.  Afebrile.      09/24/2024:  Leukocytosis.  Remains afebrile.  Fluid in BERNABE drain remains dark green.  Seen by vascular surgeon with plan for left upper extremity AV fistula fistulogram.    09/25/2024: Leukocytosis persists.  He is otherwise stable.  Left inguinal drainage fluid sent for Gram stain and culture.  G stain was negative.  Culture in progress.  He remains on broad-spectrum antimicrobials.  He was seen by urologist yesterday with plan for conservative management of left scrotal edema with orchitis.  Repeat CT this morning showed gas in the abdomen resident concern for anastomotic leak.     09/26/2024:  He had exploratory laparotomy yesterday with colon resection and end colostomy, mobilization of splenic flexure and left inguinal canal exploration.  A Penrose drain was placed in the inguinal canal.  Left IJ Vas-Cath was removed yesterday.    09/27/2024:  Leukocytosis.  Fever cough trending down.  Cultures from surgery so far negative.  Pain control better.    09/28/2024:  WBC back up to 24 K. cultures have grown E coli so far with 1 strain resistant to ceftriaxone and tetracycline.  Having nausea and is on clear liquid diet.  States he is Not feeling good.    09/29/2024:  He is feeling much better.  WBC down to 20 K.  Tolerating oral feeds.  KUB showed gas-filled bowel loops on the left side concerning for ileus.  Also showed lateral abdominal wall gas.      09/30/2024:  Continues to improve.  WBC down to 15 K.  No other acute issues overnight.    10/01/2024:  Seen and evaluated at bedside.  Continues to improve.  No acute issues overnight.  Tolerating oral feeds.    10/02/2024:  Penrose drain pulled from the left groin and scrotum..  Continues to improve.  Pending disposition.    10/03/2024: Her tachycardia yesterday.  CT abdomen and pelvis with IV contrast was performed 10/02/2024.  It showed a simple fluid collection beneath the left abdominal wall fascia and also a complex midline subcutaneous fluid collection.  Tolerating oral antibiotics okay.    10/04/2024:  Only the midline fluid collection was aspirated by IR yesterday.  2 cc of serous fluid was removed and sent for Gram stain and culture which is so far in progress.  The left abdominal subfascial fluid collection was not aspirated.  Remains otherwise stable.  Pending discharge to LTAC.    Antibiotics (From admission, onward)      Start     Stop Route Frequency Ordered    10/02/24 1200  linezolid tablet 600 mg         -- Oral Every 12 hours 10/02/24 1052    09/24/24 1800  piperacillin-tazobactam 4.5 g in dextrose 5 % 100 mL IVPB (ready to mix)         -- IV Every 12 hours (non-standard times) 09/24/24 1035    09/17/24 2100  mupirocin 2 % ointment         09/22/24 2059 Nasl 2 times daily 09/17/24 1845          Antifungals (From admission, onward)      Start     Stop Route Frequency Ordered    10/03/24 0900  fluconazole tablet 100 mg         -- Oral Daily 10/02/24 1251    10/03/24 0800  ketoconazole 2 % shampoo         10/17/24 0759 Top Every Other Day 10/02/24 1052    10/02/24 1200  miconazole 2 % cream         10/16/24 0859 Top 2 times daily 10/02/24 1052           Antivirals (From admission, onward)      None            ASSESSMENT and PLAN      1. Incarcerated left inguinal hernia with necrosis and left inguinal abscess.  Had I&D of abscess, partial sigmoid colectomy with primary anastomosis on 09/17/2024.  Then had anastomotic leak and went back to surgery 09/25/2024 for laparotomy, colectomy and colostomy with  of left inguinal area.  Continue antibiotics through 10/09/2024.    2. Seborrheic dermatitis and seborrheic capitis.  Miconazole cream to face x 14 day.  Nizoral shampoo to scalp and beard area x 14 days.    3. Bilateral epididymo-orchitis worse on the left.  Resolving.  Antibiotics as above.    4. ESRD on hemodialysis Monday/ Wednesday/Friday.  Status post left upper extremity fistulogram.  He is on transplant list.     5. Abdominal fluid collections documented on new CT 10/02/2024.  Had drainage of only the midline fluid collection by IR 10/03/2024 with only 2cc of serous fluid.  This fluid is most likely a seroma.  Larger Left abdominal fluid collection was not aspirated.  It also with probably seroma.  Expectant management as per surgeon.    RECOMMENDATIONS:    Continue Zosyn, p.o. linezolid and p.o. fluconazole for 14 day from last surgery i.e. through 10/09/2024  Check CBC and BMP weekly while on antibiotics.  Follow up with ID in 3 weeks    Thank you for involving ID in the care of this patient.  I will drop off today and see in about 3 weeks for follow up.  Please call with any questions.  Discussed with surgeon and hospitalist NP.    SUBJECTIVE      Antibiotics   Vancomycin: 09/17/2024-09/20/2024   Clindamycin:  09/07/2020 04/09/2020 2/24   Cefepime: 09/07/2024-09/22/2024   Daptomycin: 09/22/2024-  Fluconazole: 09/22/2024-  Zosyn: 09/22/2024-    Microbiology  Blood culture 09/07/2024: NGTD   Urine culture 09/19/2024: NGTD   Blood culture 09/24/2024:  NGTD   Left groin drainage culture 09/24/2024:  E coli in broth only resistant to tetracycline, cefazolin, ampicillin and ceftriaxone  Abdomen incision site culture 09/24/2024:  E coli in broth only sensitive to ceftriaxone and tetracycline    Review of Systems  Negative except as stated above in Interval History     OBJECTIVE   Temp:  [98.1 °F (36.7 °C)-98.7 °F (37.1 °C)] 98.4 °F (36.9 °C)  Pulse:  [73-77] 76  Resp:  [15-20] 18  SpO2:  [93 %-98 %] 94 %  BP:  (121-155)/(67-89) 121/73  Temp:  [98.1 °F (36.7 °C)-98.7 °F (37.1 °C)]   Temp: 98.4 °F (36.9 °C) (10/04/24 0711)  Pulse: 76 (10/04/24 0816)  Resp: 18 (10/04/24 0816)  BP: 121/73 (10/04/24 0716)  SpO2: (!) 94 % (10/04/24 0816)    Intake/Output Summary (Last 24 hours) at 10/4/2024 0823  Last data filed at 10/4/2024 0553  Gross per 24 hour   Intake 1280 ml   Output 803.5 ml   Net 476.5 ml       Physical Exam  General:  Middle-aged man lying quietly in bed.  Looks much better today  Abdomen:  Distended.  Dry dressing midline.  Colostomy left lower abdomen..  Dressing left groin area with wound packing.  Genitals:  Firm Edematous scrotum worse on the left.  Nontender  CVS: S1 and 2 heard, no murmurs appreciated   Respiratory: Clear to auscultation   Skin:  Face and beard rash is much better Musculoskeletal system: No joint or bony abnormalities appreciated   CNS: No gross focal deficits  Psych: Good mood, normal affect.    VAD:  Right radial arterial line, PIV  ISOLATION:  None    WOUNDS:  Abdominal surgical wounds, open left groin wound with packing    Significant Labs: All pertinent labs within the past 24 hours have been reviewed.    CBC LAST 7 DAYS  Recent Labs   Lab 09/28/24  0403 09/29/24  1110 09/30/24  0627 10/01/24  0546 10/02/24  0558 10/03/24  0535 10/04/24  0728   WBC 24.45* 19.88* 15.43* 15.59* 15.46* 15.81* 10.97   RBC 2.87* 2.77* 2.51* 2.85* 2.72* 2.82* 2.63*   HGB 8.5* 8.5* 7.4* 8.3* 8.1* 8.5* 7.7*   HCT 27.7* 27.4* 24.3* 28.1* 26.9* 27.8* 25.1*   MCV 97 99* 97 99* 99* 99* 95   MCH 29.6 30.7 29.5 29.1 29.8 30.1 29.3   MCHC 30.7* 31.0* 30.5* 29.5* 30.1* 30.6* 30.7*   RDW 22.5* 22.7* 21.5* 21.5* 21.4* 21.1* 21.1*   * 560* 482* 505* 497* 511* 448   MPV 10.2 10.7 10.2 9.6 10.0 9.8 9.7   GRAN 76.0* 72.0 62.0 64.0 55.0 74.0* 70.7  7.8*   LYMPH 1.0* 11.0* 10.0* 10.0* 18.0  CANCELED 13.0* 12.0*  1.3   MONO 5.0 7.0 11.0 15.0 17.0*  CANCELED 8.0 12.6  1.4*   BASO  --   --   --   --  CANCELED  --  0.04  "  NRBC 0 0 1* 1* 0 0 0       CHEMISTRY LAST 7 DAYS  Recent Labs   Lab 09/28/24  0403 09/30/24  0627 10/01/24  0546 10/02/24  0558 10/03/24  0535    134* 136 133* 131*   K 4.6 4.0 4.7 4.3 4.5   CL 97 94* 96 94* 89*   CO2 23 22* 23 19* 23   ANIONGAP 17* 18* 17* 20* 19*   BUN 37* 63* 44* 53* 37*   CREATININE 10.1* 15.5* 13.0* 15.2* 12.7*   * 105 109 114* 194*   CALCIUM 9.7 8.7 9.4 9.3 9.4   MG 2.7* 2.5 2.5 2.4 2.3   ALBUMIN 3.2* 3.0* 3.4* 3.3* 3.6   PROT 8.3 7.8 8.6* 8.4 9.1*   ALKPHOS 133 114 117 110 118   ALT 33 38 36 32 35   AST 54* 43* 34 31 31   BILITOT 0.5 0.4 0.4 0.4 0.4       Estimated Creatinine Clearance: 10.9 mL/min (A) (based on SCr of 12.7 mg/dL (H)).    INFLAMMATORY/PROCAL  LAST 7 DAYS  Recent Labs   Lab 10/03/24  1537   CRP 20.00*       No results found for: "ESR"  CRP   Date Value Ref Range Status   10/03/2024 20.00 (H) <1.00 mg/dL Final     Comment:     CRP-Normal Application expected values:   <1.0        mg/dL   Normal Range  1.0 - 5.0  mg/dL   Indicates mild inflammation  5.0 - 10.0 mg/dL   Indicates severe inflammation  >10.0        mg/dL   Represents serious processes and   frequently         indicates the presence of a bacterial   infection.      09/24/2024 35.70 (H) <1.00 mg/dL Final     Comment:     CRP-Normal Application expected values:   <1.0        mg/dL   Normal Range  1.0 - 5.0  mg/dL   Indicates mild inflammation  5.0 - 10.0 mg/dL   Indicates severe inflammation  >10.0        mg/dL   Represents serious processes and   frequently         indicates the presence of a bacterial   infection.      09/20/2024 >16.00 (H) <1.00 mg/dL Final     Comment:     CRP-Normal Application expected values:   <1.0        mg/dL   Normal Range  1.0 - 5.0  mg/dL   Indicates mild inflammation  5.0 - 10.0 mg/dL   Indicates severe inflammation  >10.0        mg/dL   Represents serious processes and   frequently         indicates the presence of a bacterial   infection.      07/16/2024 >16.00 (H) <1.00 " mg/dL Final     Comment:     CRP-Normal Application expected values:   <1.0        mg/dL   Normal Range  1.0 - 5.0  mg/dL   Indicates mild inflammation  5.0 - 10.0 mg/dL   Indicates severe inflammation  >10.0        mg/dL   Represents serious processes and   frequently         indicates the presence of a bacterial   infection.          PRIOR  MICROBIOLOGY:    Susceptibility data from last 90 days.  Collected Specimen Info Organism Amp/Sulbactam Ampicillin Cefazolin Cefepime Ceftriaxone Ciprofloxacin Clindamycin Ertapenem Erythromycin Gentamicin Levofloxacin Meropenem Oxacillin Penicillin   09/25/24 Incision site from Abdomen Parabacteroides distasonis                 09/25/24 Incision site from Abdomen Escherichia coli  I  R  R  S  S  S   S   S  S  S     09/25/24 Blood from Peripheral, Antecubital, Right No growth after 5 days.                 09/25/24 Blood from Peripheral, Forearm, Right No growth after 5 days.                 09/24/24 Incision site from Groin Escherichia coli  R  R  R  S  R  S   S   S  S  S     09/24/24 Blood No growth after 5 days.                 09/24/24 Blood No growth after 5 days.                 09/17/24 Blood from Peripheral, Antecubital, Right No growth after 5 days.                 09/17/24 Blood from Peripheral, Antecubital, Right No growth after 5 days.                 07/19/24 Catheter Tip, Tunneled Staphylococcus aureus      S   S   I     S  R   07/16/24 Blood from Peripheral, Forearm, Right Staphylococcus aureus                 07/15/24 Blood from Peripheral, Antecubital, Right Staphylococcus aureus      S   S   S     S  R   07/15/24 Blood from Peripheral, Hand, Right Staphylococcus aureus                   Collected Specimen Info Organism PIPERACILLIN/TAZO SUSCEPTIBILITY Tetracycline Tobramycin Trimeth/Sulfa Vancomycin   09/25/24 Incision site from Abdomen Parabacteroides distasonis        09/25/24 Incision site from Abdomen Escherichia coli  S  S  S  S    09/25/24 Blood from  Peripheral, Antecubital, Right No growth after 5 days.        09/25/24 Blood from Peripheral, Forearm, Right No growth after 5 days.        09/24/24 Incision site from Groin Escherichia coli  S  R  S  S    09/24/24 Blood No growth after 5 days.        09/24/24 Blood No growth after 5 days.        09/17/24 Blood from Peripheral, Antecubital, Right No growth after 5 days.        09/17/24 Blood from Peripheral, Antecubital, Right No growth after 5 days.        07/19/24 Catheter Tip, Tunneled Staphylococcus aureus   S   S  S   07/16/24 Blood from Peripheral, Forearm, Right Staphylococcus aureus        07/15/24 Blood from Peripheral, Antecubital, Right Staphylococcus aureus   S   S  S   07/15/24 Blood from Peripheral, Hand, Right Staphylococcus aureus            LAST 7 DAYS MICROBIOLOGY   Microbiology Results (last 7 days)       Procedure Component Value Units Date/Time    Culture, Body Fluid (Aerobic) w/ GS [[id number]] Collected: 10/03/24 1053    Order Status: Completed Specimen: Body Fluid Updated: 10/03/24 1608     Gram Stain Result Rare WBC's      No organisms seen    Narrative:      Abdomen, Intra Abdominal Fluid.    Culture, Anaerobic [[id number]] Collected: 10/03/24 1053    Order Status: Sent Specimen: Abscess from Abdomen Updated: 10/03/24 1116    Aerobic culture [[id number]] Collected: 10/03/24 1053    Order Status: Canceled Specimen: Body Fluid from Abdomen     Fungus culture [[id number]] Collected: 09/25/24 1413    Order Status: Completed Specimen: Incision site from Abdomen Updated: 10/03/24 0444     Fungus (Mycology) Culture No fungal growth to date    Culture, Anaerobe [[id number]]  (Abnormal) Collected: 09/25/24 1413    Order Status: Completed Specimen: Incision site from Abdomen Updated: 10/01/24 1418     Anaerobic Culture PARABACTEROIDES DISTASONIS  Few  Beta Lactamase positive      Blood culture [[id number]] Collected: 09/25/24 1111    Order Status: Completed Specimen: Blood from Peripheral,  Antecubital, Right Updated: 09/30/24 1432     Blood Culture, Routine No growth after 5 days.    Narrative:      [id number]    Blood culture [[id number]] Collected: 09/25/24 1111    Order Status: Completed Specimen: Blood from Peripheral, Forearm, Right Updated: 09/30/24 1432     Blood Culture, Routine No growth after 5 days.    Narrative:      [id number]    Blood culture [[id number]] Collected: 09/24/24 1513    Order Status: Completed Specimen: Blood Updated: 09/29/24 1632     Blood Culture, Routine No growth after 5 days.    Narrative:      Collection has been rescheduled by [email or web address] at 09/23/2024 14:35 Reason:   Patient unavailable dialysis   Collection has been rescheduled by MYB at 09/23/2024 18:44 Reason:   Unable to collect  Collection has been rescheduled by CZB at 09/23/2024 19:02 Reason:   Unable to collect  Collection has been rescheduled by RE1 at 09/24/2024 09:43 Reason:   Spoke to the patient's nurse about unable to collect she is   contacting Lambert and the doctor  Collection has been rescheduled by [email or web address] at 09/23/2024 14:35 Reason:   Patient unavailable dialysis   Collection has been rescheduled by MYB at 09/23/2024 18:44 Reason:   Unable to collect  Collection has been rescheduled by CZB at 09/23/2024 19:02 Reason:   Unable to collect  Collection has been rescheduled by RE1 at 09/24/2024 09:43 Reason:   Spoke to the patient's nurse about unable to collect she is   contacting Lambert and the doctor    Blood culture [[id number]] Collected: 09/24/24 1122    Order Status: Completed Specimen: Blood Updated: 09/29/24 1232     Blood Culture, Routine No growth after 5 days.    Narrative:      Collection has been rescheduled by [email or web address] at 09/23/2024 14:35 Reason:   Patient unavailable dialysis   Collection has been rescheduled by MYB at 09/23/2024 18:44 Reason:   Unable to collect  Collection has been rescheduled by CZB at 09/23/2024 19:02 Reason:   Unable to collect  Collection has been rescheduled by RE1 at 09/24/2024 09:43  Reason:   Spoke to the patient's nurse about unable to collect she is   contacting Lambert and the doctor  Collection has been rescheduled by [email or web address] at 09/23/2024 14:35 Reason:   Patient unavailable dialysis   Collection has been rescheduled by MYB at 09/23/2024 18:44 Reason:   Unable to collect  Collection has been rescheduled by CZB at 09/23/2024 19:02 Reason:   Unable to collect  Collection has been rescheduled by RE1 at 09/24/2024 09:43 Reason:   Spoke to the patient's nurse about unable to collect she is   contacting Lambert and the doctor    AFB Culture & Smear [[id number]] Collected: 09/25/24 1413    Order Status: Completed Specimen: Incision site from Abdomen Updated: 09/28/24 1630     AFB CULTURE STAIN No acid fast bacilli seen.     AFB CULTURE STAIN Testing performed by:     AFB CULTURE STAIN Lab [facility]     [place] [facility] [place] [address]     AFB CULTURE STAIN Dr. Osbaldo Sherwood MD    IV catheter culture [[id number]] Collected: 09/25/24 1132    Order Status: Completed Specimen: Catheter Tip, Dialysis Updated: 09/28/24 1113     Aerobic Culture - Cath tip No growth    Narrative:      Trialysis    Aerobic culture [[id number]]  (Abnormal)  (Susceptibility) Collected: 09/24/24 1535    Order Status: Completed Specimen: Incision site from Groin Updated: 09/28/24 0738     Aerobic Bacterial Culture ESCHERICHIA COLI  From broth only      Narrative:      Left groin incision site drainage swab    Aerobic culture [[id number]]  (Abnormal)  (Susceptibility) Collected: 09/25/24 1413    Order Status: Completed Specimen: Incision site from Abdomen Updated: 09/28/24 0737     Aerobic Bacterial Culture ESCHERICHIA COLI  From broth only      Gram stain [[id number]] Collected: 09/25/24 1413    Order Status: Completed Specimen: Incision site from Abdomen Updated: 09/27/24 1530     Gram Stain Result Moderate WBC's      No organisms seen            CURRENT/PREVIOUS VISIT  EKG  Results for orders placed or performed during the hospital encounter of 09/17/24   EKG 12-lead    Collection Time: 09/17/24  8:57 AM   Result Value Ref Range    QRS Duration 92 ms    [facility] QTC Calculation 469 ms    Narrative    Test Reason : N18.6,    Vent. Rate : 119 BPM     Atrial Rate : 119 BPM     P-R Int : 134 ms          QRS Dur : 092 ms      QT Int : 334 ms       P-R-T Axes : 027 -78 004 degrees     QTc Int : 469 ms    Sinus tachycardia  Left axis deviation  Cannot rule out Anterior infarct ,age undetermined  Abnormal ECG  When compared with ECG of 15-JUL-2024 17:47,  T wave inversion now evident in Inferior leads  T wave inversion no longer evident in Lateral leads  Confirmed by Jose Cruz MD ([id number]) on 9/22/2024 1:02:21 PM    Referred By: BRANDON   SELF           Confirmed By:Jose Cruz MD     Significant Imaging: I have reviewed all relevant and available imaging results/findings within the past 24 hours.    I spent a total of 45 minutes on the day of the visit.This includes face to face time and non-face to face time preparing to see the patient (eg, review of tests), obtaining and/or reviewing separately obtained history, documenting clinical information in the electronic or other health record, independently interpreting results and communicating results to the patient/family/caregiver, or care coordinator.    Kerwin Au MD  Date of Service: 10/04/2024      This note was created using [employer] voice recognition software that occasionally misinterpreted phrases or words.

## 2024-10-04 NOTE — ASSESSMENT & PLAN NOTE
W/ Colonic perforation with abscess  CT abdomen shows Left inguinal hernia containing fat and air as well as marked inflammation extending from inside the pelvis, in the hernia and down into the left scrotum.   S/P Robotic sigmoid resection, Mobilization of splenic flexure, left inguinal canal exploration, and I&D 9/17 per Dr. Connors with repeat OR visit for anastomotic leak on 9/25  Continue regular diet:  Started 9/30  Pain uncontrolled:  Add OxyContin twice daily in addition to the p.o. Dilaudid p.r.n.

## 2024-10-04 NOTE — PROGRESS NOTES
INPATIENT NEPHROLOGY Progress Note  [facility] NEPHROLOGY    João Ham  10/04/2024    Reason for consultation:  ESRD    Chief Complaint:   Chief Complaint   Patient presents with    Fatigue    Groin Swelling     X 2 days.  Hx of Kidney failure and CHF     History of Present Illness:  Per H and P    Patient is a 44 year old male with history of ESDR on dialysis MWF, awaiting kidney transplant, HTN, presented to ED with 3 days history of left groin pain and swelling. States swelling comes every time he cough and is being painful. Patient has low grade fever 99s at home. He has been vomiting bilious fluid last 2-3 days. No diarrhea or abdominal pain.      In the ED CT abdomen showed Left inguinal hernia containing fat and air as well as marked inflammation extending from inside the pelvis, in the hernia and down into the left scrotum. This is consistent with an infected inguinal hernia, possible gangrene. WBC was 14, patient was tachycardic. General surgery was consulted and patient was admitted under hospitalist.     9/19  avf nonfunctional.   No sob or chest pain.  Has post op pain.  AFVSS  9/20  afvss.  Pt doesn't want to see previous vascular surgeon who put his avf in.  No alternative available.  Transfer center called.  Has temporary trialysis catheter.  Patient seen on hemodialysis for uremic clearance and ultrafiltration.    9/21  2.5L off w/HD yesterday.  Tmax 101.8, pressures ok.  Lab results reviewed, Hgb low but better than yesterday.  C/o post op pain, no other complaints.  9/22 POD 5.  VSS, lab results reviewed.  Hgb 7.6, added epo to HD orders.  C/o gas pains, plans to move around more today.  Next HD tomorrow.  9/23 VSS. HD today. Transfuse with next HD as needed if Hgb stil low.  9/24 VSS. Appreciate input from Urology, Gen Surgery, Vascular Surgery, ID on this complex patient. Plan for fistulogram tomorrow. CXR yesterday shows trialysis line tip in appropriate cocation. Will attempt HD today since  we skipped yesterday due to nurse and patient concerns that the line may be malpositioned.  9/25 VSS. s/p cephalic vein dilation by Dr. Robledo yesterday, tolerated HD very well. Hold HD today. Hypotension this AM, low grade fever yesterday, WBC elevated, received IVF bolus and Midodrine. Continues to be infected per Surgery, we can remove trialysis line now that AVF is treated and usable... Consider ICU. Stopped hydralazine and olmesartan, decreased Clonidine to 0.1 BID, not sure what to do with Metoprolol 150mg and Hydralazine 120mg... He may be sob/hypoxic due to anemia, suggest 1 PRBC instead of IVF, since Hgb is 7.    Addendum @ 10:47 AM  Seen at bedside in ICU with Dr. Rodriguez and Dr. Au. Mother present as well. Reviewed imaging. Agree with plan for urgent ex lap, remove central line and place mid line, keep current abx, hold on transfusion and dialysis and reassess later. ABG and fresh set of cx now, re-evalaute later.    Addendum @ 4:36 PM  Discussed briefly with Dr. Connors, dialysis nurse Brant and with ICU nurse. Patient had colostomy placed due to anastomotic leak and had 2 PRBC given. Upon return to floor was hypertensive with SBP > 200 and NPO. Advised Cleviprex drip for now (earlier to day he was on max dose Clonidine, mad dose Hydralazine, max dose Olmesartan, 150mg Metoprolol-XL and 120mg of Imdur - none of which he can get due to NPO) and will do urgent HD with UF 2L or so as tolerated.  9/26  POD 1 s/p . Exploratory laparotomy,. Colon resection end-colostomy, Mobilization of splenic flexure, and left inguinal canal exploration.  Post pain. No sob.  Sleepy.    9/27  AFVSS.  Some post op pain.  No sob.  No angina.   Patient seen on hemodialysis for uremic clearance and ultrafiltration.    9/28 s/p 3.8 liter uf yesterday.     AFVSS.  Post op pain.  Dry cough.  No sob.  Projectile vomiting earlier today per patient  9/30 VSS. HD today.  10/1 GS note reviewed, working on pain control, tolerating  diet  10/2 no major events overnight- due for HD today  10/3 fever, tachycardia, abd pain and stagnant leukocytosis yest- CT with possible fluid collection- may need IR drainage- remains on IV antbx  10/4 fluid collection neg for infection- working on discharge plan to NSR today- will do HD before discharge- pain control ongoing issue- hospital medicine aware    Plan of Care:    ESRD on HD MWF  --continue dialysis per routine    Anemia of CKD  --Hb drop- bump LOR with HD today  --transfused this admission- no acute transfusion needs today    Secondary HPT  --renal diet  --continue binders with meals    Hypertension  --continue current BP meds  --uf with hd as needed    Hyponatremia  Hyperkalemia  Acidosis  --adjust dialysate  --renal diet    AVF malfunction fixed  --appreciate Vascular eval, s/p fistulogram and cephalic vein stenosis dilation on 9/23 by Dr. Salvador.    Inguinal hernia of left side with gangrene - W/ Colonic perforation with abscess   --S/P Robotic sigmoid resection, Mobilization of splenic flexure, left inguinal canal exploration, and I&D 9/17 per Dr. Connors   --dose antbx for CrCl < 10/HD  --CT 10/2 with new fluid collection- IR aspiration neg for infection    Thank you for allowing us to participate in this patient's care. We will continue to follow.    Vital Signs:  Temp Readings from Last 3 Encounters:   10/04/24 98.4 °F (36.9 °C) (Oral)   08/22/24 97.3 °F (36.3 °C) (Temporal)   07/30/24 98.6 °F (37 °C) (Oral)       Pulse Readings from Last 3 Encounters:   10/04/24 76   08/29/24 (!) 115   08/22/24 110       BP Readings from Last 3 Encounters:   10/04/24 121/73   08/29/24 99/68   08/22/24 (!) 137/91       Weight:  Wt Readings from Last 3 Encounters:   09/18/24 134 kg (295 lb 6.7 oz)   08/29/24 131.3 kg (289 lb 7.4 oz)   08/22/24 131 kg (288 lb 12.8 oz)       Medications:  No current facility-administered medications on file prior to encounter.     Current Outpatient Medications on File Prior to  Encounter   Medication Sig Dispense Refill    apixaban (ELIQUIS) 2.5 mg Tab Take 2.5 mg by mouth 2 (two) times daily.      calcitRIOL (ROCALTROL) 0.25 MCG Cap Take 1 capsule by mouth once daily (Patient taking differently: Take 0.25 mcg by mouth once daily.) 90 capsule 1    cholecalciferol, vitamin D3, 125 mcg (5,000 unit) Tab Take 1 tablet by mouth once daily.      cloNIDine (CATAPRES) 0.3 MG tablet TAKE 1 TABLET BY MOUTH THREE TIMES DAILY 270 tablet 2    isosorbide mononitrate (IMDUR) 120 MG 24 hr tablet Take 120 mg by mouth once daily.      metoprolol succinate (TOPROL-XL) 50 MG 24 hr tablet Take 150 mg by mouth once daily.      olmesartan (BENICAR) 40 MG tablet Take 1 tablet by mouth once daily 90 tablet 0    sevelamer carbonate (RENVELA) 800 mg Tab Take 2 tablets (1,600 mg total) by mouth 3 (three) times daily with meals. 180 tablet 11    calcium carbonate (TUMS) 200 mg calcium (500 mg) chewable tablet Take 2 tablets (1,000 mg total) by mouth 3 (three) times daily with meals. (Patient taking differently: Take 1,000 mg by mouth 3 (three) times daily.) 180 tablet 11    hydrALAZINE (APRESOLINE) 100 MG tablet Take 1 tablet (100 mg total) by mouth 3 (three) times daily. 90 tablet 11     Scheduled Meds:   cloNIDine  0.3 mg Oral TID    epoetin mily-epbx  10,000 Units Intravenous Every Mon, Wed, Fri    fluconazole  100 mg Oral Daily    gabapentin  100 mg Oral TID    heparin (porcine)  5,000 Units Subcutaneous Q8H    hydrALAZINE  100 mg Oral Q8H    HYDROmorphone  1 mg Intravenous Once    isosorbide mononitrate  30 mg Oral Daily    ketoconazole   Topical (Top) Every Other Day    LIDOcaine-prilocaine   Topical (Top) Once    linezolid  600 mg Oral Q12H    losartan  100 mg Oral Daily    metoprolol succinate  150 mg Oral Daily    miconazole   Topical (Top) BID    oxymetazoline  2 spray Each Nostril BID    piperacillin-tazobactam (Zosyn) IV (PEDS and ADULTS) (extended infusion is not appropriate)  4.5 g Intravenous Q12H     "sevelamer carbonate  1,600 mg Oral TID WM     Continuous Infusions:        PRN Meds:.  Current Facility-Administered Medications:     0.9% NaCl, , Intravenous, PRN    acetaminophen, 650 mg, Oral, Q4H PRN    ALPRAZolam, 0.25 mg, Oral, Nightly PRN    aluminum-magnesium hydroxide-simethicone, 30 mL, Oral, QID PRN    dextrose 10%, 12.5 g, Intravenous, PRN    dextrose 10%, 12.5 g, Intravenous, PRN    dextrose 10%, 12.5 g, Intravenous, PRN    dextrose 10%, 25 g, Intravenous, PRN    dextrose 10%, 25 g, Intravenous, PRN    dextrose 10%, 25 g, Intravenous, PRN    glucagon (human recombinant), 1 mg, Intramuscular, PRN    glucose, 16 g, Oral, PRN    glucose, 24 g, Oral, PRN    heparin (porcine), 4,000 Units, Intravenous, PRN    HYDROmorphone, 4 mg, Oral, Q4H PRN    insulin aspart U-100, 0-5 Units, Subcutaneous, QID (AC + HS) PRN    iohexoL, 100 mL, Intravenous, ONCE PRN    LIDOcaine-prilocaine, , Topical (Top), PRN    magnesium oxide, 800 mg, Oral, PRN    magnesium oxide, 800 mg, Oral, PRN    melatonin, 6 mg, Oral, Nightly PRN    naloxone, 0.02 mg, Intravenous, PRN    ondansetron, 4 mg, Intravenous, Q6H PRN    oxyCODONE-acetaminophen, 1 tablet, Oral, Q4H PRN    potassium bicarbonate, 35 mEq, Oral, PRN    potassium bicarbonate, 50 mEq, Oral, PRN    potassium bicarbonate, 60 mEq, Oral, PRN    potassium, sodium phosphates, 2 packet, Oral, PRN    potassium, sodium phosphates, 2 packet, Oral, PRN    potassium, sodium phosphates, 2 packet, Oral, PRN    promethazine (PHENERGAN) 12.5 mg in 0.9% NaCl 50 mL IVPB, 12.5 mg, Intravenous, Q6H PRN    simethicone, 1 tablet, Oral, TID PRN    sodium chloride 0.9%, 250 mL, Intravenous, PRN    sodium chloride 0.9%, 3 mL, Intravenous, Q12H PRN    Review of Systems:  Neg    Physical Exam:    /73   Pulse 76   Temp 98.4 °F (36.9 °C) (Oral)   Resp 18   Ht 6' 2" (1.88 m)   Wt 134 kg (295 lb 6.7 oz)   SpO2 (!) 94%   BMI 37.93 kg/m²     General Appearance:    Alert, cooperative, no distress, " appears stated age   Head:    Normocephalic, without obvious abnormality, atraumatic   Eyes:    PER, conjunctiva/corneas clear, EOM's intact in both eyes        Throat:   Lips, mucosa, and tongue normal; teeth and gums normal   Back:     Symmetric, no curvature, ROM normal, no CVA tenderness   Lungs:     Clear to auscultation bilaterally, respirations unlabored   Chest wall:    No tenderness or deformity   Heart:    Regular rate and rhythm, S1 and S2 normal, no murmur, rub   or gallop   Abdomen:     Soft, non-tender, bowel sounds active all four quadrants,     no masses, no organomegaly   Extremities:   Extremities normal, atraumatic, no cyanosis or edema   Pulses:   2+ and symmetric all extremities   MSK:   No joint or muscle swelling, tenderness or deformity   Skin:   Skin color, texture, turgor normal, no rashes or lesions   Neurologic:   CNII-XII intact, normal strength and sensation       Throughout.  No flap     Results:  Recent Labs   Lab 10/02/24  0558 10/03/24  0535 10/04/24  0728   * 131* 130*   K 4.3 4.5 4.4   CL 94* 89* 89*   CO2 19* 23 22*   BUN 53* 37* 53*   CREATININE 15.2* 12.7* 15.0*   * 194* 106       Recent Labs   Lab 10/02/24  0558 10/03/24  0535 10/04/24  0728   CALCIUM 9.3 9.4 9.5   ALBUMIN 3.3* 3.6 3.4*   MG 2.4 2.3 2.3       Recent Labs   Lab 02/02/23  0745 05/19/23  1022 06/19/23  1031   PTH, Intact 225.6 H 335.8 H 319.0 H       Recent Labs   Lab 10/01/24  1638 10/01/24  2007 10/02/24  0719 10/02/24  1643 10/02/24  2102 10/03/24  0703 10/03/24  1116 10/03/24  1643 10/03/24  1918 10/04/24  0728   POCTGLUCOSE 132* 138* 126* 146* 141* 152* 99 130* 130* 141*       Recent Labs   Lab 10/10/22  2005 06/22/23  0446 07/16/24  0510   Hemoglobin A1C 5.5 5.5 6.1       Recent Labs   Lab 10/02/24  0558 10/03/24  0535 10/04/24  0728   WBC 15.46* 15.81* 10.97   HGB 8.1* 8.5* 7.7*   HCT 26.9* 27.8* 25.1*   * 511* 448   MCV 99* 99* 95   MCHC 30.1* 30.6* 30.7*   MONO 17.0*  CANCELED 8.0  12.6  1.4*   EOSINOPHIL 0.0 0.0 2.1       Recent Labs   Lab 10/02/24  0558 10/03/24  0535 10/04/24  0728   BILITOT 0.4 0.4 0.4   PROT 8.4 9.1* 8.4   ALBUMIN 3.3* 3.6 3.4*   ALKPHOS 110 118 91   ALT 32 35 25   AST 31 31 21       Recent Labs   Lab 10/10/22  1645 01/09/23  1123 06/19/23  1622 07/17/24  0505 09/19/24  1435   Color, UA Yellow   < > Straw Yellow Yellow   Appearance, UA Clear   < > Clear Hazy A Clear   pH, UA 6.0   < > 6.0 6.0 8.0   Specific Gravity, UA 1.025   < > 1.010 1.020 1.010   Protein, UA 2+ A   < > 2+ A 3+ A 2+ A   Glucose, UA Negative   < > Negative Negative Negative   Ketones, UA Negative   < > Negative Negative Negative   Urobilinogen, UA Negative  --   --  Negative Negative   Bilirubin (UA) Negative   < > Negative Negative Negative   Occult Blood UA 1+ A   < > Negative 2+ A 2+ A   Nitrite, UA Negative   < > Negative Negative Negative   RBC, UA 1   < > 0 6 H 4   WBC, UA 0   < > 2 52 H 21 H   Bacteria None   < > None None None   Hyaline Casts, UA 0   < > 0 0 0    < > = values in this interval not displayed.       Recent Labs   Lab 07/15/24  1824 09/17/24  0928 09/25/24  1051   POC PH  --   --  7.404   POC PCO2  --   --  37.7   POC HCO3  --   --  23.6 L   POC PO2  --   --  87   POC SATURATED O2  --   --  97   POC BE  --   --  -1   Sample VENOUS VENOUS ARTERIAL       Microbiology Results (last 7 days)       Procedure Component Value Units Date/Time    Culture, Body Fluid (Aerobic) w/ GS [[id number]] Collected: 10/03/24 1053    Order Status: Completed Specimen: Body Fluid Updated: 10/03/24 1608     Gram Stain Result Rare WBC's      No organisms seen    Narrative:      Abdomen, Intra Abdominal Fluid.    Culture, Anaerobic [[id number]] Collected: 10/03/24 1053    Order Status: Sent Specimen: Abscess from Abdomen Updated: 10/03/24 1116    Aerobic culture [[id number]] Collected: 10/03/24 1053    Order Status: Canceled Specimen: Body Fluid from Abdomen     Fungus culture [[id number]] Collected:  09/25/24 1413    Order Status: Completed Specimen: Incision site from Abdomen Updated: 10/03/24 0444     Fungus (Mycology) Culture No fungal growth to date    Culture, Anaerobe [[id number]]  (Abnormal) Collected: 09/25/24 1413    Order Status: Completed Specimen: Incision site from Abdomen Updated: 10/01/24 1418     Anaerobic Culture PARABACTEROIDES DISTASONIS  Few  Beta Lactamase positive      Blood culture [[id number]] Collected: 09/25/24 1111    Order Status: Completed Specimen: Blood from Peripheral, Antecubital, Right Updated: 09/30/24 1432     Blood Culture, Routine No growth after 5 days.    Narrative:      [id number]    Blood culture [[id number]] Collected: 09/25/24 1111    Order Status: Completed Specimen: Blood from Peripheral, Forearm, Right Updated: 09/30/24 1432     Blood Culture, Routine No growth after 5 days.    Narrative:      [id number]    Blood culture [[id number]] Collected: 09/24/24 1513    Order Status: Completed Specimen: Blood Updated: 09/29/24 1632     Blood Culture, Routine No growth after 5 days.    Narrative:      Collection has been rescheduled by [email or web address] at 09/23/2024 14:35 Reason:   Patient unavailable dialysis   Collection has been rescheduled by MYB at 09/23/2024 18:44 Reason:   Unable to collect  Collection has been rescheduled by CZB at 09/23/2024 19:02 Reason:   Unable to collect  Collection has been rescheduled by RE1 at 09/24/2024 09:43 Reason:   Spoke to the patient's nurse about unable to collect she is   contacting Lambert and the doctor  Collection has been rescheduled by [email or web address] at 09/23/2024 14:35 Reason:   Patient unavailable dialysis   Collection has been rescheduled by MYB at 09/23/2024 18:44 Reason:   Unable to collect  Collection has been rescheduled by CZB at 09/23/2024 19:02 Reason:   Unable to collect  Collection has been rescheduled by RE1 at 09/24/2024 09:43 Reason:   Spoke to the patient's nurse about unable to collect she is   contacting Lambert and the doctor    Blood culture  [[id number]] Collected: 09/24/24 1122    Order Status: Completed Specimen: Blood Updated: 09/29/24 1232     Blood Culture, Routine No growth after 5 days.    Narrative:      Collection has been rescheduled by [email or web address] at 09/23/2024 14:35 Reason:   Patient unavailable dialysis   Collection has been rescheduled by MYB at 09/23/2024 18:44 Reason:   Unable to collect  Collection has been rescheduled by CZB at 09/23/2024 19:02 Reason:   Unable to collect  Collection has been rescheduled by RE1 at 09/24/2024 09:43 Reason:   Spoke to the patient's nurse about unable to collect she is   contacting Lambert and the doctor  Collection has been rescheduled by [email or web address] at 09/23/2024 14:35 Reason:   Patient unavailable dialysis   Collection has been rescheduled by MYB at 09/23/2024 18:44 Reason:   Unable to collect  Collection has been rescheduled by CZB at 09/23/2024 19:02 Reason:   Unable to collect  Collection has been rescheduled by RE1 at 09/24/2024 09:43 Reason:   Spoke to the patient's nurse about unable to collect she is   contacting Lambert and the doctor    AFB Culture & Smear [[id number]] Collected: 09/25/24 1413    Order Status: Completed Specimen: Incision site from Abdomen Updated: 09/28/24 1630     AFB CULTURE STAIN No acid fast bacilli seen.     AFB CULTURE STAIN Testing performed by:     AFB CULTURE STAIN Lab [facility]     AFB CULTURE STAIN [address] [place] [address]     [place] [address]     AFB CULTURE STAIN Dr. Osbaldo Sherwood MD    IV catheter culture [[id number]] Collected: 09/25/24 1132    Order Status: Completed Specimen: Catheter Tip, Dialysis Updated: 09/28/24 1113     Aerobic Culture - Cath tip No growth    Narrative:      Trialysis    Aerobic culture [[id number]]  (Abnormal)  (Susceptibility) Collected: 09/24/24 1535    Order Status: Completed Specimen: Incision site from Groin Updated: 09/28/24 0738     Aerobic Bacterial Culture ESCHERICHIA COLI  From broth only      Narrative:      Left  groin incision site drainage swab    Aerobic culture [[id number]]  (Abnormal)  (Susceptibility) Collected: 09/25/24 1413    Order Status: Completed Specimen: Incision site from Abdomen Updated: 09/28/24 0737     Aerobic Bacterial Culture ESCHERICHIA COLI  From broth only      Gram stain [[id number]] Collected: 09/25/24 1413    Order Status: Completed Specimen: Incision site from Abdomen Updated: 09/27/24 1530     Gram Stain Result Moderate WBC's      No organisms seen          Patient care time was spent personally by me on the following activities: > 35 minutes  Obtaining a history.  Examination of patient.  Providing medical care at the patients bedside.  Developing a treatment plan with patient or surrogate and bedside caregivers.  Ordering and reviewing laboratory studies, radiographic studies, pulse oximetry.  Ordering and performing treatments and interventions.  Evaluation of patient's response to treatment.  Discussions with consultants while on the unit and immediately available to the patient.  Re-evaluation of the patient's condition.  Documentation in the medical record.      Oni Garcia MD    [facility] [place]  [phone]

## 2024-10-04 NOTE — DISCHARGE INSTRUCTIONS
[facility]  Facility Transfer Orders        Admit to: [facility]    Diagnoses:   Active Hospital Problems    Diagnosis  POA    *Inguinal hernia of left side with gangrene [K40.40]  Yes    Perforation of intestine [K63.1]  No     Priority: 2     Bleeding from the nose [R04.0]  No    Insomnia [G47.00]  No    Debility [R53.81]  No     Chronic    Orchitis [N45.2]  No    Acute hypoxic respiratory failure [J96.01]  Yes    Sepsis [A41.9]  Yes    Essential hypertension [I10]  Yes    Sleep apnea [G47.30]  Yes    Pulmonary hypertension [I27.20]  Yes    Severe obesity with body mass index (BMI) of 35.0 to 39.9 with comorbidity [E66.01]  Yes    ESRD (end stage renal disease) [N18.6]  Yes    Anemia due to chronic kidney disease, on chronic dialysis [N18.6, D63.1, Z99.2]  Not Applicable      Resolved Hospital Problems    Diagnosis Date Resolved POA    Hyperkalemia [E87.5] 10/02/2024 Yes    Dialysis AV fistula malfunction [T82.590A] 09/30/2024 No     Allergies:   Review of patient's allergies indicates:   Allergen Reactions    Mushroom Swelling     Tongue swells    Tongue swells       Tongue swells       Code Status: FULL    Vitals: Routine       Diet: renal diet    Activity: Activity as tolerated    Nursing Precautions: Fall and Pressure ulcer prevention    Bed/Surface: Low Air Loss    Consults: PT to evaluate and treat- 7 times a week, OT to evaluate and treat- 7 times a week, and Wound Care    Oxygen: room air; BiPAP qHS and with naps FiO2 40%, IP 18, EP 14    Dialysis: Patient is on dialysis. MWF    Labs: First blood draw on 10/7/24.              CBL and BMP    Pending Diagnostic Studies:       None          Imaging: NA    Nursing care: Remove midline 10/9/2024 after patient finishes his last dose of IV antibiotics. If patient develops nasal bleeding unrelieved by Afrin or nursing intervention please contact facility physician or ER. Do not give Afrin more than 1-2 times in a 24 hour period or for more  than 3 days in a row.     Miscellaneous Care:   Routine Skin for Bedridden Patients:  Apply moisture barrier cream to all    IV Access: Mid-line and Dialysis Access     Medications: Discontinue all previous medication orders, if any. See new list below.  Current Discharge Medication List        START taking these medications    Details   acetaminophen (TYLENOL) 325 MG tablet Take 2 tablets (650 mg total) by mouth every 4 (four) hours as needed.      ALPRAZolam (XANAX) 0.25 MG tablet Take 1 tablet (0.25 mg total) by mouth nightly as needed for Insomnia.      aluminum-magnesium hydroxide-simethicone (MAALOX) 200-200-20 mg/5 mL Susp Take 30 mLs by mouth 4 (four) times daily as needed.      epoetin mily-epbx (RETACRIT) 20,000 unit/mL injection Inject 0.67 mLs (13,400 Units total) into the skin every Mon, Wed, Fri.    Associated Diagnoses: ESRD (end stage renal disease)      fluconazole (DIFLUCAN) 100 MG tablet Take 1 tablet (100 mg total) by mouth once daily. for 4 days. Stop date 10/9/2024      gabapentin (NEURONTIN) 100 MG capsule Take 1 capsule (100 mg total) by mouth 3 (three) times daily.      heparin sodium,porcine (HEPARIN, PORCINE,) 1,000 unit/mL injection Inject 4 mLs (4,000 Units total) into the vein as needed (line care after dialysis).      heparin sodium,porcine (HEPARIN, PORCINE,) 5,000 unit/mL injection Inject 1 mL (5,000 Units total) into the skin every 8 (eight) hours.      HYDROmorphone (DILAUDID) 4 MG tablet Take 1 tablet (4 mg total) by mouth every 4 (four) hours as needed for Pain.    Comments: Quantity prescribed more than 7 day supply? No      insulin aspart U-100 (NOVOLOG) 100 unit/mL (3 mL) InPn pen Inject 0-5 Units into the skin before meals and at bedtime as needed (Hyperglycemia).      ketoconazole (NIZORAL) 2 % shampoo Apply topically every other day.      LIDOcaine-prilocaine (EMLA) cream Apply topically as needed (pre dialysis).      linezolid (ZYVOX) 600 mg Tab Take 1 tablet (600 mg  total) by mouth every 12 (twelve) hours. for 4 days. Stop date 10/9/2024      melatonin (MELATIN) 3 mg tablet Take 2 tablets (6 mg total) by mouth nightly as needed for Insomnia.      miconazole (MICOTIN) 2 % cream Apply topically 2 (two) times daily.      ondansetron (ZOFRAN-ODT) 4 MG TbDL Take 1 tablet (4 mg total) by mouth every 6 (six) hours as needed (nausea).      oxyCODONE (OXYCONTIN) 10 mg 12 hr tablet Take 1 tablet (10 mg total) by mouth every 12 (twelve) hours.    Comments: Quantity prescribed more than 7 day supply? No      piperacillin sodium/tazobactam (PIPERACILLIN-TAZOBACTAM 4.5G/100ML D5W IVPB, READY TO MIX,) Inject 100 mLs (4.5 g total) into the vein every 12 (twelve) hours. for 4 days. Stop date 10/9/2024      simethicone (MYLICON) 80 MG chewable tablet Take 1 tablet (80 mg total) by mouth 3 (three) times daily as needed for Flatulence.           CONTINUE these medications which have CHANGED    Details   calcitRIOL (ROCALTROL) 0.25 MCG Cap Take 1 capsule (0.25 mcg total) by mouth once daily.    Associated Diagnoses: Vitamin D deficiency      calcium carbonate (TUMS) 200 mg calcium (500 mg) chewable tablet Take 2 tablets (1,000 mg total) by mouth 3 (three) times daily.  Qty: 180 tablet, Refills: 11      cloNIDine (CATAPRES) 0.3 MG tablet Take 1 tablet (0.3 mg total) by mouth 3 (three) times daily as needed (SBP > 150).    Comments: .      hydrALAZINE (APRESOLINE) 100 MG tablet Take 1 tablet (100 mg total) by mouth every 8 (eight) hours. Hold for SBP < 120 and before dialysis    Comments: .      isosorbide mononitrate (IMDUR) 30 MG 24 hr tablet Take 1 tablet (30 mg total) by mouth once daily.           CONTINUE these medications which have NOT CHANGED    Details   metoprolol succinate (TOPROL-XL) 50 MG 24 hr tablet Take 150 mg by mouth once daily.      olmesartan (BENICAR) 40 MG tablet Take 1 tablet by mouth once daily  Qty: 90 tablet, Refills: 0    Comments: .      sevelamer carbonate (RENVELA) 800  mg Tab Take 2 tablets (1,600 mg total) by mouth 3 (three) times daily with meals.  Qty: 180 tablet, Refills: 11        oxymetazoline (AFRIN) 0.05 % nasal spray, 2 sprays BID - Do not take more than 1-2 times in a 24 hour period or for longer than 3 days at a time.     STOP taking these medications       apixaban (ELIQUIS) 2.5 mg Tab Comments:   Reason for Stopping:         cholecalciferol, vitamin D3, 125 mcg (5,000 unit) Tab Comments:   Reason for Stopping:             Follow up:    Follow-up Information       Galileo Connors MD Follow up.    Specialties: General Surgery, Colon and Rectal Surgery, Surgery  Why: 10/9 @ 12:45  Contact information:  [address]  [address]  [place] [address]  [phone]               Dawson Granado MD Follow up.    Specialty: Family Medicine  Contact information:  [address] [facility] [address]  [phone]                               Immunizations Administered as of 10/4/2024       No immunizations on file.          Myra Cummings NP      Discharge Instructions, [facility]    Thank you for choosing [facility] for your medical care. The primary doctor who is taking care of you at the time of your discharge is Ag Reyes, *.     You were admitted to the hospital with Inguinal hernia of left side with gangrene.     Please note your discharge instructions, including diet/activity restrictions, follow-up appointments, and medication changes.  If you have any questions about your medical issues, prescriptions, or any other questions, please feel free to contact the [facility] Medicine Dept at [phone] and we will help.    If you are previously with Home health, outpatient PT/OT or under a therapy program, you are cleared to return to those programs.    Please direct all long term medication refills and follow up to your primary care provider, Dawson Granado MD. Thank you again for letting us take  care of your health care needs.    Please note the following discharge instructions per your discharging physician-    Please take all antibiotics as prescribed     You can continue to apply the cream prescribed by Infectious diseases to your face      Do not use Afrin spray frequently, it is only for temporary use just for once or twice when you have any cell bleed.  If the bleed is not improving or if you are seeing any passing clots, please call your doctor.    For any other worsening signs or symptoms, please call your PCP/go to the emergency department     For any other emergency, please call 911.

## 2024-10-04 NOTE — PROGRESS NOTES
2500 ml net uf removed   10/04/24 1350   Required for all Hemodialysis Patients   Hepatitis Status negative   Handoff Report   Received From Nabila   Given To Jose   Treatment Type   Treatment Type Maintenance   Vital Signs   Temp 98.2 °F (36.8 °C)   Temp Source Oral   Pulse 78   Resp 18   SpO2 96 %   Device (Oxygen Therapy) room air   BP (!) 97/44   BP Location Right leg   BP Method Automatic   Patient Position Lying   Assessments (Pre/Post)   Consent Obtained yes   Safety vein preservation armband present   Date Hepatitis Profile Obtained 09/19/24   Blood Liters Processed (BLP) 50   Transport Modality bed   Level of Consciousness (AVPU) alert   Dialyzer Clearance mildly streaked   Pain   Preferred Pain Scale number (Numeric Rating Pain Scale)   Pain Rating (0-10): Rest 0        Hemodialysis AV Fistula Left forearm   No placement date or time found.   Present Prior to Hospital Arrival?: Yes  Size/Length: 17 G  Location: Left forearm   Site Assessment Clean;Dry;Intact   Patency Present;Thrill;Bruit   Status Deaccessed   Flows Good   Dressing Status Dry;Clean;Intact   Site Condition No complications   Dressing Gauze   Post-Hemodialysis Assessment   Rinseback Volume (mL) 250 mL   Blood Volume Processed (Liters) 50 L   Dialyzer Clearance Lightly streaked   Duration of Treatment 180 minutes   Additional Fluid Intake (mL) 500 mL   Total UF (mL) 3000 mL   Net Fluid Removal 2500   Patient Response to Treatment tolerated well   Post-Treatment Weight 131.5 kg (289 lb 14.5 oz)   Treatment Weight Change -2.5   Arterial bleeding stop time (min) 5 min   Venous bleeding stop time (min) 5 min   Post-Hemodialysis Comments tx completed   Edema   Edema generalized     Educated on access maintance

## 2024-10-04 NOTE — PT/OT/SLP PROGRESS
Occupational Therapy      Patient Name:  João Ham   MRN:  [id number]    Patient not seen today secondary to Dialysis. Will follow-up next service date.    10/4/2024

## 2024-10-04 NOTE — SUBJECTIVE & OBJECTIVE
Interval History: Patient seen and examined.  He reports continued issues with pain-he states during the day his pain levels fluctuate between 3 and 5 in his very tolerable, but increases at night up to 8 and 9.  No nausea or vomiting.  Tolerating diet.  Ostomy with good output.  IR aspirate appeared serosanguineous with no bacteria on Gram stain, cultures pending.    Discussed with General surgery, Nephrology, id.  All good with transition to inpatient rehab, however rehab reviewed and would like to see some stability with pain control and ensure Pt remained stable clinically and will plan to admit for Sunday.    Review of Systems   Constitutional:  Negative for chills and fatigue.   Respiratory:  Negative for cough and shortness of breath.    Cardiovascular:  Negative for chest pain and leg swelling.   Gastrointestinal:  Positive for abdominal pain. Negative for nausea and vomiting.     Objective:     Vital Signs (Most Recent):  Temp: 98.4 °F (36.9 °C) (10/04/24 0711)  Pulse: 76 (10/04/24 0816)  Resp: 17 (10/04/24 1059)  BP: 121/73 (10/04/24 0716)  SpO2: (!) 94 % (10/04/24 0816) Vital Signs (24h Range):  Temp:  [98.1 °F (36.7 °C)-98.7 °F (37.1 °C)] 98.4 °F (36.9 °C)  Pulse:  [73-77] 76  Resp:  [15-20] 17  SpO2:  [93 %-98 %] 94 %  BP: (121-155)/(67-89) 121/73     Weight: 134 kg (295 lb 6.7 oz)  Body mass index is 37.93 kg/m².    Intake/Output Summary (Last 24 hours) at 10/4/2024 1113  Last data filed at 10/4/2024 0952  Gross per 24 hour   Intake 1760 ml   Output 802.5 ml   Net 957.5 ml         Physical Exam  Vitals and nursing note reviewed.   Constitutional:       Appearance: Normal appearance. He is obese.   HENT:      Head: Normocephalic and atraumatic.   Eyes:      Conjunctiva/sclera: Conjunctivae normal.      Pupils: Pupils are equal, round, and reactive to light.   Cardiovascular:      Rate and Rhythm: Normal rate and regular rhythm.   Pulmonary:      Effort: Pulmonary effort is normal.      Breath sounds:  Normal breath sounds.   Abdominal:      General: Abdomen is flat. Bowel sounds are normal. There is no distension.      Palpations: Abdomen is soft.      Tenderness: There is abdominal tenderness. There is no guarding.      Comments: Dressings CDI  BERNABE with scant serosanguineous drainage in tubing    Stoma pink and moist, loose stool in ostomy bag   Skin:     General: Skin is warm and dry.      Capillary Refill: Capillary refill takes less than 2 seconds.   Neurological:      General: No focal deficit present.      Mental Status: He is alert and oriented to person, place, and time. Mental status is at baseline.   Psychiatric:         Mood and Affect: Mood normal.             Significant Labs: All pertinent labs within the past 24 hours have been reviewed.  CBC:   Recent Labs   Lab 10/03/24  0535 10/04/24  0728   WBC 15.81* 10.97   HGB 8.5* 7.7*   HCT 27.8* 25.1*   * 448     CMP:   Recent Labs   Lab 10/03/24  0535 10/04/24  0728   * 130*   K 4.5 4.4   CL 89* 89*   CO2 23 22*   * 106   BUN 37* 53*   CREATININE 12.7* 15.0*   CALCIUM 9.4 9.5   PROT 9.1* 8.4   ALBUMIN 3.6 3.4*   BILITOT 0.4 0.4   ALKPHOS 118 91   AST 31 21   ALT 35 25   ANIONGAP 19* 19*     Microbiology Results (last 7 days)       Procedure Component Value Units Date/Time    Culture, Body Fluid (Aerobic) w/ GS [[id number]] Collected: 10/03/24 1053    Order Status: Completed Specimen: Body Fluid Updated: 10/04/24 1044     AEROBIC CULTURE - FLUID No growth     Gram Stain Result Rare WBC's      No organisms seen    Narrative:      Abdomen, Intra Abdominal Fluid.    Culture, Anaerobic [[id number]] Collected: 10/03/24 1053    Order Status: Sent Specimen: Abscess from Abdomen Updated: 10/03/24 1116    Aerobic culture [[id number]] Collected: 10/03/24 1053    Order Status: Canceled Specimen: Body Fluid from Abdomen     Fungus culture [[id number]] Collected: 09/25/24 1413    Order Status: Completed Specimen: Incision site from Abdomen Updated:  10/03/24 0444     Fungus (Mycology) Culture No fungal growth to date    Culture, Anaerobe [[id number]]  (Abnormal) Collected: 09/25/24 1413    Order Status: Completed Specimen: Incision site from Abdomen Updated: 10/01/24 1418     Anaerobic Culture PARABACTEROIDES DISTASONIS  Few  Beta Lactamase positive      Blood culture [[id number]] Collected: 09/25/24 1111    Order Status: Completed Specimen: Blood from Peripheral, Antecubital, Right Updated: 09/30/24 1432     Blood Culture, Routine No growth after 5 days.    Narrative:      [id number]    Blood culture [[id number]] Collected: 09/25/24 1111    Order Status: Completed Specimen: Blood from Peripheral, Forearm, Right Updated: 09/30/24 1432     Blood Culture, Routine No growth after 5 days.    Narrative:      [id number]    Blood culture [[id number]] Collected: 09/24/24 1513    Order Status: Completed Specimen: Blood Updated: 09/29/24 1632     Blood Culture, Routine No growth after 5 days.    Narrative:      Collection has been rescheduled by [email or web address] at 09/23/2024 14:35 Reason:   Patient unavailable dialysis   Collection has been rescheduled by MYB at 09/23/2024 18:44 Reason:   Unable to collect  Collection has been rescheduled by CZB at 09/23/2024 19:02 Reason:   Unable to collect  Collection has been rescheduled by RE1 at 09/24/2024 09:43 Reason:   Spoke to the patient's nurse about unable to collect she is   contacting Lambert and the doctor  Collection has been rescheduled by [email or web address] at 09/23/2024 14:35 Reason:   Patient unavailable dialysis   Collection has been rescheduled by MYB at 09/23/2024 18:44 Reason:   Unable to collect  Collection has been rescheduled by CZB at 09/23/2024 19:02 Reason:   Unable to collect  Collection has been rescheduled by RE1 at 09/24/2024 09:43 Reason:   Spoke to the patient's nurse about unable to collect she is   contacting Lambert and the doctor    Blood culture [[id number]] Collected: 09/24/24 1122    Order Status: Completed Specimen: Blood Updated:  09/29/24 1232     Blood Culture, Routine No growth after 5 days.    Narrative:      Collection has been rescheduled by [email or web address] at 09/23/2024 14:35 Reason:   Patient unavailable dialysis   Collection has been rescheduled by MYB at 09/23/2024 18:44 Reason:   Unable to collect  Collection has been rescheduled by CZB at 09/23/2024 19:02 Reason:   Unable to collect  Collection has been rescheduled by RE1 at 09/24/2024 09:43 Reason:   Spoke to the patient's nurse about unable to collect she is   contacting Lambert and the doctor  Collection has been rescheduled by [email or web address] at 09/23/2024 14:35 Reason:   Patient unavailable dialysis   Collection has been rescheduled by MYB at 09/23/2024 18:44 Reason:   Unable to collect  Collection has been rescheduled by CZB at 09/23/2024 19:02 Reason:   Unable to collect  Collection has been rescheduled by RE1 at 09/24/2024 09:43 Reason:   Spoke to the patient's nurse about unable to collect she is   contacting Lambert and the doctor    AFB Culture & Smear [[id number]] Collected: 09/25/24 1413    Order Status: Completed Specimen: Incision site from Abdomen Updated: 09/28/24 1630     AFB CULTURE STAIN No acid fast bacilli seen.     AFB CULTURE STAIN Testing performed by:     AFB CULTURE STAIN Lab [facility]     AFB CULTURE STAIN [address] [place] [address]     [place] [address]     AFB CULTURE STAIN Dr. Osbaldo Sherwood MD    IV catheter culture [[id number]] Collected: 09/25/24 1132    Order Status: Completed Specimen: Catheter Tip, Dialysis Updated: 09/28/24 1113     Aerobic Culture - Cath tip No growth    Narrative:      Trialysis    Aerobic culture [[id number]]  (Abnormal)  (Susceptibility) Collected: 09/24/24 1535    Order Status: Completed Specimen: Incision site from Groin Updated: 09/28/24 0738     Aerobic Bacterial Culture ESCHERICHIA COLI  From broth only      Narrative:      Left groin incision site drainage swab    Aerobic culture [[id number]]  (Abnormal)   (Susceptibility) Collected: 09/25/24 1413    Order Status: Completed Specimen: Incision site from Abdomen Updated: 09/28/24 0737     Aerobic Bacterial Culture ESCHERICHIA COLI  From broth only      Gram stain [[id number]] Collected: 09/25/24 1413    Order Status: Completed Specimen: Incision site from Abdomen Updated: 09/27/24 1530     Gram Stain Result Moderate WBC's      No organisms seen            Significant Imaging: I have reviewed all pertinent imaging results/findings within the past 24 hours.

## 2024-10-04 NOTE — ASSESSMENT & PLAN NOTE
S/p open laparotomy with resection of affected colon and creation of colostomy 9/25  ID following: Continue IV Zyvox, IV Zosyn, and Diflucan  Continue mobilization as tolerated:  Out of bed to chair t.i.d. with meals  PT/OT following  Continue regular diet  Pain management  -Repeat CT on 10/2 with some new fluid collections- IR aspirated with no signs of infection   -continue current Abx with end date 10/09

## 2024-10-04 NOTE — PLAN OF CARE
Received call from Alley with [facility] stating that the medical director and nephrologist want to have the patient admitted on [facility] 10/6/24.       10/04/24 1524   Post-Acute Status   Post-Acute Authorization Placement   Post-Acute Placement Status Pending medical clearance/testing

## 2024-10-04 NOTE — ASSESSMENT & PLAN NOTE
Patient with Acute debility due to bed confinement status and prolonged hospitalization, and infection . Latest AMPAC and GEMS scores have not been reviewed. Plan includes progressive mobility protocol initated, PT/OT consulted, fall precautions in place, and CM following for discharge planning .  Accepted to [facility] rehab-they plan to admit Sunday

## 2024-10-04 NOTE — RESPIRATORY THERAPY
10/04/24 0816   Patient Assessment/Suction   Level of Consciousness (AVPU) alert   Respiratory Effort Unlabored   PRE-TX-O2   Device (Oxygen Therapy) room air   SpO2 (!) 94 %   Pulse Oximetry Type Intermittent   $ Pulse Oximetry - Multiple Charge Pulse Oximetry - Multiple   Pulse 76   Resp 18   Preset CPAP/BiPAP Settings   Mode Of Delivery Standby;BiPAP S/T   $ CPAP/BiPAP Daily Charge BiPAP/CPAP Daily   Equipment Type V60

## 2024-10-04 NOTE — PT/OT/SLP PROGRESS
Physical Therapy      Patient Name:  João Ham   MRN:  [id number]    Patient not seen today secondary to Dialysis. Will follow-up 10/5/24.

## 2024-10-04 NOTE — RESPIRATORY THERAPY
10/03/24 2000   Patient Assessment/Suction   Level of Consciousness (AVPU) alert   Respiratory Effort Unlabored   Expansion/Accessory Muscles/Retractions no use of accessory muscles;no retractions   All Lung Fields Breath Sounds Anterior:;Lateral:;clear;diminished   Rhythm/Pattern, Respiratory no shortness of breath reported;unlabored   Cough Frequency no cough   PRE-TX-O2   Device (Oxygen Therapy) room air   SpO2 95 %   Pulse Oximetry Type Intermittent   $ Pulse Oximetry - Multiple Charge Pulse Oximetry - Multiple   Pulse 75   Resp 19   Positioning HOB elevated 30 degrees   Preset CPAP/BiPAP Settings   Mode Of Delivery BiPAP;Standby   Education   $ Education 15 min;BiPAP   Respiratory Evaluation   $ Care Plan Tech Time 15 min

## 2024-10-04 NOTE — PROGRESS NOTES
[facility] Medicine  Progress Note    Patient Name: João Ham  MRN: [id number]  Patient Class: IP- Inpatient   Admission Date: 9/17/2024  Length of Stay: 17 days  Attending Physician: Wilfrido Rich MD  Primary Care Provider: Dawson Granado MD        Subjective:     Principal Problem:Inguinal hernia of left side with gangrene        HPI:  Patient is a 44 year old male with history of ESDR on dialysis MWF, awaiting kidney transplant, HTN, presented to ED with 3 days history of left groin pain and swelling. States swelling comes every time he cough and is being painful. Patient has low grade fever 99s at home. He has been vomiting bilious fluid last 2-3 days. No diarrhea or abdominal pain.     In the ED CT abdomen showed Left inguinal hernia containing fat and air as well as marked inflammation extending from inside the pelvis, in the hernia and down into the left scrotum. This is consistent with an infected inguinal hernia, possible gangrene. WBC was 14, patient was tachycardic. General surgery was consulted and patient was admitted under hospitalist.     Overview/Hospital Course:  Patient is a 44 year old male with history of ESRD, was admitted with suspected inguinal hernia gangrene. General surgery was consulted and patient was taken to OR. Found to have colonic perforation due to mesh erosion with an abscess. Patient underwent sigmoid resection and drainage of the abscess, was started on clindamycin, Vancomycin and cefepime. Nephrology was consulted for chronic dialysis. AVF was not functioning, General surgery consulted, trialysis catheter placed. PRBC transfused during dialysis for low Hb 6.6.  While on broad-spectrum coverage, patient is spiking fevers and worsening leukocytosis, id consulted, discontinued clindamycin, vancomycin and cefepime-added daptomycin, Diflucan and Zosyn.  Repeated CT abdomen and pelvis that showed residual abscess/phlegmon and contained perforation.  Re-consulted surgery who mentioned likely post op changes. Fistulogram 9/24 by vascular surgery for declotting the AV fistula. Trialysis removed 9/25. Had worsening white count, fevers upto 103.5 and hypotension overnight 9/25.  Also had hemoglobin of 7, with worsening hypoxia up to 7 L (overnight desaturated to 70% with lethargy requiring BiPAP placement), after which nephrology recommended 1 unit PRBC transfusion.  Id, Nephrology, Urology, General surgery were updated about his worsening condition.We CT repeated that showed intraperitoneal free air and findings suggestive of necrotizing fasciitis, general surgery was consulted stat, patient was transferred to ICU.  The surgeon performed open laparotomy and noted fecal contamination of the left lower quadrant indicative of anastomotic disruption.  Colon was resected, and colostomy was created. Patient was placed on full liquid diet, was unable to tolerate and downgraded back to clear liquid diet.  Also complaining of excess mucus and cough.  Patient encouraged to use incentive spirometer as directed, and press pillow to abdomen when coughing.  Pulmonology signed off on 09/29 with recommendation to continue BiPAP nightly and with naps.  PT/OT recommended high-intensity therapy due to patient's deconditioning and discharge planning was started.  Pain regimen was adjusted.  Tachycardia and stagnant white blood cell count noted with low-grade temps-a CT abdomen and pelvis was pursued on 10/02.  This was revealing for 2 new fluid collections.  These were discussed with general surgery and IR.  IR did do an aspirate of the collection they felt easily accessible, this was negative on Gram stain with cultures pending.  Did not feel either fluid collection with rim enhancing or indicative of an abscess.  General surgery agreed.  Leukocytosis improved.  Temperature is improved.  He had issues with ongoing pain control and long-acting pain regimen was added.    Interval  History: Patient seen and examined.  He reports continued issues with pain-he states during the day his pain levels fluctuate between 3 and 5 in his very tolerable, but increases at night up to 8 and 9.  No nausea or vomiting.  Tolerating diet.  Ostomy with good output.  IR aspirate appeared serosanguineous with no bacteria on Gram stain, cultures pending.    Discussed with General surgery, Nephrology, id.  All good with transition to inpatient rehab, however rehab reviewed and would like to see some stability with pain control and ensure Pt remained stable clinically and will plan to admit for Sunday.    Review of Systems   Constitutional:  Negative for chills and fatigue.   Respiratory:  Negative for cough and shortness of breath.    Cardiovascular:  Negative for chest pain and leg swelling.   Gastrointestinal:  Positive for abdominal pain. Negative for nausea and vomiting.     Objective:     Vital Signs (Most Recent):  Temp: 98.4 °F (36.9 °C) (10/04/24 0711)  Pulse: 76 (10/04/24 0816)  Resp: 17 (10/04/24 1059)  BP: 121/73 (10/04/24 0716)  SpO2: (!) 94 % (10/04/24 0816) Vital Signs (24h Range):  Temp:  [98.1 °F (36.7 °C)-98.7 °F (37.1 °C)] 98.4 °F (36.9 °C)  Pulse:  [73-77] 76  Resp:  [15-20] 17  SpO2:  [93 %-98 %] 94 %  BP: (121-155)/(67-89) 121/73     Weight: 134 kg (295 lb 6.7 oz)  Body mass index is 37.93 kg/m².    Intake/Output Summary (Last 24 hours) at 10/4/2024 1113  Last data filed at 10/4/2024 0952  Gross per 24 hour   Intake 1760 ml   Output 802.5 ml   Net 957.5 ml         Physical Exam  Vitals and nursing note reviewed.   Constitutional:       Appearance: Normal appearance. He is obese.   HENT:      Head: Normocephalic and atraumatic.   Eyes:      Conjunctiva/sclera: Conjunctivae normal.      Pupils: Pupils are equal, round, and reactive to light.   Cardiovascular:      Rate and Rhythm: Normal rate and regular rhythm.   Pulmonary:      Effort: Pulmonary effort is normal.      Breath sounds: Normal  breath sounds.   Abdominal:      General: Abdomen is flat. Bowel sounds are normal. There is no distension.      Palpations: Abdomen is soft.      Tenderness: There is abdominal tenderness. There is no guarding.      Comments: Dressings CDI  BERNABE with scant serosanguineous drainage in tubing    Stoma pink and moist, loose stool in ostomy bag   Skin:     General: Skin is warm and dry.      Capillary Refill: Capillary refill takes less than 2 seconds.   Neurological:      General: No focal deficit present.      Mental Status: He is alert and oriented to person, place, and time. Mental status is at baseline.   Psychiatric:         Mood and Affect: Mood normal.             Significant Labs: All pertinent labs within the past 24 hours have been reviewed.  CBC:   Recent Labs   Lab 10/03/24  0535 10/04/24  0728   WBC 15.81* 10.97   HGB 8.5* 7.7*   HCT 27.8* 25.1*   * 448     CMP:   Recent Labs   Lab 10/03/24  0535 10/04/24  0728   * 130*   K 4.5 4.4   CL 89* 89*   CO2 23 22*   * 106   BUN 37* 53*   CREATININE 12.7* 15.0*   CALCIUM 9.4 9.5   PROT 9.1* 8.4   ALBUMIN 3.6 3.4*   BILITOT 0.4 0.4   ALKPHOS 118 91   AST 31 21   ALT 35 25   ANIONGAP 19* 19*     Microbiology Results (last 7 days)       Procedure Component Value Units Date/Time    Culture, Body Fluid (Aerobic) w/ GS [[id number]] Collected: 10/03/24 1053    Order Status: Completed Specimen: Body Fluid Updated: 10/04/24 1044     AEROBIC CULTURE - FLUID No growth     Gram Stain Result Rare WBC's      No organisms seen    Narrative:      Abdomen, Intra Abdominal Fluid.    Culture, Anaerobic [[id number]] Collected: 10/03/24 1053    Order Status: Sent Specimen: Abscess from Abdomen Updated: 10/03/24 1116    Aerobic culture [[id number]] Collected: 10/03/24 1053    Order Status: Canceled Specimen: Body Fluid from Abdomen     Fungus culture [[id number]] Collected: 09/25/24 1413    Order Status: Completed Specimen: Incision site from Abdomen Updated:  10/03/24 0444     Fungus (Mycology) Culture No fungal growth to date    Culture, Anaerobe [[id number]]  (Abnormal) Collected: 09/25/24 1413    Order Status: Completed Specimen: Incision site from Abdomen Updated: 10/01/24 1418     Anaerobic Culture PARABACTEROIDES DISTASONIS  Few  Beta Lactamase positive      Blood culture [[id number]] Collected: 09/25/24 1111    Order Status: Completed Specimen: Blood from Peripheral, Antecubital, Right Updated: 09/30/24 1432     Blood Culture, Routine No growth after 5 days.    Narrative:      [id number]    Blood culture [[id number]] Collected: 09/25/24 1111    Order Status: Completed Specimen: Blood from Peripheral, Forearm, Right Updated: 09/30/24 1432     Blood Culture, Routine No growth after 5 days.    Narrative:      [id number]    Blood culture [[id number]] Collected: 09/24/24 1513    Order Status: Completed Specimen: Blood Updated: 09/29/24 1632     Blood Culture, Routine No growth after 5 days.    Narrative:      Collection has been rescheduled by [email or web address] at 09/23/2024 14:35 Reason:   Patient unavailable dialysis   Collection has been rescheduled by MYB at 09/23/2024 18:44 Reason:   Unable to collect  Collection has been rescheduled by CZB at 09/23/2024 19:02 Reason:   Unable to collect  Collection has been rescheduled by RE1 at 09/24/2024 09:43 Reason:   Spoke to the patient's nurse about unable to collect she is   contacting Lambert and the doctor  Collection has been rescheduled by [email or web address] at 09/23/2024 14:35 Reason:   Patient unavailable dialysis   Collection has been rescheduled by MYB at 09/23/2024 18:44 Reason:   Unable to collect  Collection has been rescheduled by CZB at 09/23/2024 19:02 Reason:   Unable to collect  Collection has been rescheduled by RE1 at 09/24/2024 09:43 Reason:   Spoke to the patient's nurse about unable to collect she is   contacting Lambert and the doctor    Blood culture [[id number]] Collected: 09/24/24 1122    Order Status: Completed Specimen: Blood Updated:  09/29/24 1232     Blood Culture, Routine No growth after 5 days.    Narrative:      Collection has been rescheduled by [email or web address] at 09/23/2024 14:35 Reason:   Patient unavailable dialysis   Collection has been rescheduled by MYB at 09/23/2024 18:44 Reason:   Unable to collect  Collection has been rescheduled by CZB at 09/23/2024 19:02 Reason:   Unable to collect  Collection has been rescheduled by RE1 at 09/24/2024 09:43 Reason:   Spoke to the patient's nurse about unable to collect she is   contacting Lambert and the doctor  Collection has been rescheduled by [email or web address] at 09/23/2024 14:35 Reason:   Patient unavailable dialysis   Collection has been rescheduled by MYB at 09/23/2024 18:44 Reason:   Unable to collect  Collection has been rescheduled by CZB at 09/23/2024 19:02 Reason:   Unable to collect  Collection has been rescheduled by RE1 at 09/24/2024 09:43 Reason:   Spoke to the patient's nurse about unable to collect she is   contacting Lambert and the doctor    AFB Culture & Smear [[id number]] Collected: 09/25/24 1413    Order Status: Completed Specimen: Incision site from Abdomen Updated: 09/28/24 1630     AFB CULTURE STAIN No acid fast bacilli seen.     AFB CULTURE STAIN Testing performed by:     AFB CULTURE STAIN Lab [facility]     AFB CULTURE STAIN [address] [place] [address]     [place] [address]     AFB CULTURE STAIN Dr. Osbaldo Sherwood MD    IV catheter culture [[id number]] Collected: 09/25/24 1132    Order Status: Completed Specimen: Catheter Tip, Dialysis Updated: 09/28/24 1113     Aerobic Culture - Cath tip No growth    Narrative:      Trialysis    Aerobic culture [[id number]]  (Abnormal)  (Susceptibility) Collected: 09/24/24 1535    Order Status: Completed Specimen: Incision site from Groin Updated: 09/28/24 0738     Aerobic Bacterial Culture ESCHERICHIA COLI  From broth only      Narrative:      Left groin incision site drainage swab    Aerobic culture [[id number]]  (Abnormal)   (Susceptibility) Collected: 09/25/24 1413    Order Status: Completed Specimen: Incision site from Abdomen Updated: 09/28/24 0737     Aerobic Bacterial Culture ESCHERICHIA COLI  From broth only      Gram stain [[id number]] Collected: 09/25/24 1413    Order Status: Completed Specimen: Incision site from Abdomen Updated: 09/27/24 1530     Gram Stain Result Moderate WBC's      No organisms seen            Significant Imaging: I have reviewed all pertinent imaging results/findings within the past 24 hours.    Assessment/Plan:      * Inguinal hernia of left side with gangrene  W/ Colonic perforation with abscess  CT abdomen shows Left inguinal hernia containing fat and air as well as marked inflammation extending from inside the pelvis, in the hernia and down into the left scrotum.   S/P Robotic sigmoid resection, Mobilization of splenic flexure, left inguinal canal exploration, and I&D 9/17 per Dr. Connors with repeat OR visit for anastomotic leak on 9/25  Continue regular diet:  Started 9/30  Pain uncontrolled:  Add OxyContin twice daily in addition to the p.o. Dilaudid p.r.n.    Perforation of intestine  S/p open laparotomy with resection of affected colon and creation of colostomy 9/25  ID following: Continue IV Zyvox, IV Zosyn, and Diflucan  Continue mobilization as tolerated:  Out of bed to chair t.i.d. with meals  PT/OT following  Continue regular diet  Pain management  -Repeat CT on 10/2 with some new fluid collections- IR aspirated with no signs of infection   -continue current Abx with end date 10/09    Bleeding from the nose  -humidify the bipap  -Afrin BID  -Not acutely bleeding on my exam today  -Heparin SQ was held:  Resolved. Resumed 10/4      Insomnia  Was started on  Xanax 0.25 mg q.h.s. PRN insomnia      Debility  Patient with Acute debility due to bed confinement status and prolonged hospitalization, and infection . Latest AMPAC and GEMS scores have not been reviewed. Plan includes progressive mobility  protocol initated, PT/OT consulted, fall precautions in place, and CM following for discharge planning .  [facility] plan to [facility] Sunday    Orchitis  Urology consulted: no surgical/invasive intervention recommended  S/P left inguinal canal exploration with Penrose drain placed in the inguinal canal 9/25 per general surgeon.  Continue antibiotics per Infectious Disease    Sepsis  This patient does have evidence of infective focus  My overall impression is sepsis.  Source: Abdominal  Antibiotics given-   Antibiotics (72h ago, onward)      Start     Stop Route Frequency Ordered    09/27/24 1015  linezolid 600 mg/300 mL IVPB 600 mg         -- IV Every 12 hours (non-standard times) 09/27/24 0909    09/24/24 1800  piperacillin-tazobactam 4.5 g in dextrose 5 % 100 mL IVPB (ready to mix)         -- IV Every 12 hours (non-standard times) 09/24/24 1035        fluconazole (DIFLUCAN) IVPB 200 mg  Start Date/Time (Original Order): 09/22/24 1500   Ordered Dose: 200 mg Route: Intravenous Frequency: Every 24 hours (non-standard times)       Source control achieved by: IV antibiotics and surgical intervention  =======================================================  Blood culture 09/07/2024: NGTD   Urine culture 09/19/2024: NGTD   Blood culture 09/24/2024:  NGTD   Left groin drainage culture 09/24/2024:  E coli only resistant to tetracycline, cefazolin, ampicillin and ceftriaxone  Abdomen incision site culture 09/24/2024:  E coli only sensitive to ceftriaxone and tetracycline  =====================================================  WBC continues trending down   Infectious disease following   Continue current antibiotic regimen    Acute hypoxic respiratory failure  Patient with Hypoxic Respiratory failure which is Acute.  he is not on home oxygen. Supplemental oxygen was provided and noted- Oxygen Concentration (%):  [28] 28  Continue nightly BiPAP and supplemental O2, weaning as tolerated.  Pulmonology signed  "off 9/29    ABG  No results for input(s): "PH", "PO2", "PCO2", "HCO3", "BE" in the last 168 hours.  =================================  Had issues with some nasal bleeding/clotting.  -Bipap humidification; nightly.    Essential hypertension  Chronic, controlled. Latest blood pressure and vitals reviewed-     Temp:  [97.7 °F (36.5 °C)-99.2 °F (37.3 °C)]   Pulse:  []   Resp:  [16-20]   BP: (111-148)/(60-94)   SpO2:  [91 %-100 %] .   Home meds for hypertension were reviewed and noted below.   Hypertension Medications               cloNIDine (CATAPRES) 0.3 MG tablet TAKE 1 TABLET BY MOUTH THREE TIMES DAILY    hydrALAZINE (APRESOLINE) 100 MG tablet Take 1 tablet (100 mg total) by mouth 3 (three) times daily.    isosorbide mononitrate (IMDUR) 120 MG 24 hr tablet Take 120 mg by mouth once daily.    metoprolol succinate (TOPROL-XL) 50 MG 24 hr tablet Take 150 mg by mouth once daily.    olmesartan (BENICAR) 40 MG tablet Take 1 tablet by mouth once daily        While in the hospital, will manage blood pressure as follows; Continue current antihypertensive regimen: hydralazine, losartan, clonidine, and metoprolol.  Patient was apparently getting nitro paste q.6 hours.  This was discontinued yesterday and his home isosorbide was resumed.    Will utilize p.r.n. blood pressure medication only if patient's blood pressure greater than 180/110 and he develops symptoms such as worsening chest pain or shortness of breath.    Sleep apnea  Currently getting BiPAP nightly while in the hospital per pulmonology    Pulmonary hypertension  Chronic.  No need to address acutely.    Severe obesity with body mass index (BMI) of 35.0 to 39.9 with comorbidity  Body mass index is 37.93 kg/m². Morbid obesity complicates all aspects of disease management from diagnostic modalities to treatment. Weight loss encouraged and health benefits explained to patient.     Anemia due to chronic kidney disease, on chronic dialysis  Anemia is likely due to " acute blood loss which was from surgery and chronic disease due to ESRD. Most recent hemoglobin and hematocrit are listed below.  Recent Labs     09/29/24  1110 09/30/24  0627 10/01/24  0546   HGB 8.5* 7.4* 8.3*   HCT 27.4* 24.3* 28.1*     Plan  - Monitor serial CBC: Daily  - Transfuse PRBC if patient becomes hemodynamically unstable, symptomatic or H/H drops below 7/21.  - Patient has received 3 units of PRBCs  - Patient's anemia is currently stable    ESRD (end stage renal disease)  Creatine stable for now. BMP reviewed- noted Estimated Creatinine Clearance: 10.7 mL/min (A) (based on SCr of 13 mg/dL (H)). according to latest data. Based on current GFR, CKD stage is end stage.  Monitor UOP and serial BMP and adjust therapy as needed. Renally dose meds. Avoid nephrotoxic medications and procedures.  Nephrology following  Dialysis management per Nephrology  Renal diet      VTE Risk Mitigation (From admission, onward)           Ordered     heparin (porcine) injection 5,000 Units  Every 8 hours         10/04/24 0807     heparin (porcine) injection 4,000 Units  As needed (PRN)         09/20/24 1544     IP VTE HIGH RISK PATIENT  Once         09/17/24 1031     Place sequential compression device  Until discontinued         09/17/24 1031                    Discharge Planning   MARIA ISABEL: 10/6/2024     Code Status: Full Code   Is the patient medically ready for discharge?:     Reason for patient still in hospital (select all that apply): Pending disposition  Discharge Plan A: Rehab   Discharge Delays: None known at this time              Myra Cummings NP  Department of Hospital Medicine   [facility]

## 2024-10-05 LAB
ALBUMIN SERPL BCP-MCNC: 3.7 G/DL (ref 3.5–5.2)
ALP SERPL-CCNC: 101 U/L (ref 55–135)
ALT SERPL W/O P-5'-P-CCNC: 23 U/L (ref 10–44)
ANION GAP SERPL CALC-SCNC: 17 MMOL/L (ref 8–16)
AST SERPL-CCNC: 21 U/L (ref 10–40)
BACTERIA SPEC ANAEROBE CULT: NORMAL
BASOPHILS # BLD AUTO: 0.05 K/UL (ref 0–0.2)
BASOPHILS NFR BLD: 0.5 % (ref 0–1.9)
BILIRUB SERPL-MCNC: 0.4 MG/DL (ref 0.1–1)
BUN SERPL-MCNC: 41 MG/DL (ref 6–20)
CALCIUM SERPL-MCNC: 9.1 MG/DL (ref 8.7–10.5)
CHLORIDE SERPL-SCNC: 94 MMOL/L (ref 95–110)
CO2 SERPL-SCNC: 18 MMOL/L (ref 23–29)
CREAT SERPL-MCNC: 12.4 MG/DL (ref 0.5–1.4)
DIFFERENTIAL METHOD BLD: ABNORMAL
EOSINOPHIL # BLD AUTO: 0.2 K/UL (ref 0–0.5)
EOSINOPHIL NFR BLD: 2 % (ref 0–8)
ERYTHROCYTE [DISTWIDTH] IN BLOOD BY AUTOMATED COUNT: 21.4 % (ref 11.5–14.5)
EST. GFR  (NO RACE VARIABLE): 4.7 ML/MIN/1.73 M^2
GLUCOSE SERPL-MCNC: 103 MG/DL (ref 70–110)
HCT VFR BLD AUTO: 28.2 % (ref 40–54)
HGB BLD-MCNC: 8.5 G/DL (ref 14–18)
IMM GRANULOCYTES # BLD AUTO: 0.17 K/UL (ref 0–0.04)
IMM GRANULOCYTES NFR BLD AUTO: 1.7 % (ref 0–0.5)
LYMPHOCYTES # BLD AUTO: 1.3 K/UL (ref 1–4.8)
LYMPHOCYTES NFR BLD: 12.8 % (ref 18–48)
MAGNESIUM SERPL-MCNC: 2.4 MG/DL (ref 1.6–2.6)
MCH RBC QN AUTO: 30.5 PG (ref 27–31)
MCHC RBC AUTO-ENTMCNC: 30.1 G/DL (ref 32–36)
MCV RBC AUTO: 100 FL (ref 82–98)
MONOCYTES # BLD AUTO: 1.4 K/UL (ref 0.3–1)
MONOCYTES NFR BLD: 14 % (ref 4–15)
NEUTROPHILS # BLD AUTO: 6.8 K/UL (ref 1.8–7.7)
NEUTROPHILS NFR BLD: 69 % (ref 38–73)
NRBC BLD-RTO: 0 /100 WBC
PHOSPHATE SERPL-MCNC: 8.5 MG/DL (ref 2.7–4.5)
PLATELET # BLD AUTO: 365 K/UL (ref 150–450)
PMV BLD AUTO: 10.7 FL (ref 9.2–12.9)
POCT GLUCOSE: 104 MG/DL (ref 70–110)
POCT GLUCOSE: 108 MG/DL (ref 70–110)
POCT GLUCOSE: 125 MG/DL (ref 70–110)
POCT GLUCOSE: 127 MG/DL (ref 70–110)
POTASSIUM SERPL-SCNC: 4.7 MMOL/L (ref 3.5–5.1)
PROT SERPL-MCNC: 8.9 G/DL (ref 6–8.4)
RBC # BLD AUTO: 2.79 M/UL (ref 4.6–6.2)
SODIUM SERPL-SCNC: 129 MMOL/L (ref 136–145)
WBC # BLD AUTO: 9.81 K/UL (ref 3.9–12.7)

## 2024-10-05 PROCEDURE — 83735 ASSAY OF MAGNESIUM: CPT | Performed by: SURGERY

## 2024-10-05 PROCEDURE — 63600175 PHARM REV CODE 636 W HCPCS: Performed by: SURGERY

## 2024-10-05 PROCEDURE — 63700000 PHARM REV CODE 250 ALT 637 W/O HCPCS: Performed by: INTERNAL MEDICINE

## 2024-10-05 PROCEDURE — 25000003 PHARM REV CODE 250: Performed by: NURSE PRACTITIONER

## 2024-10-05 PROCEDURE — 25000003 PHARM REV CODE 250: Performed by: SURGERY

## 2024-10-05 PROCEDURE — 94761 N-INVAS EAR/PLS OXIMETRY MLT: CPT

## 2024-10-05 PROCEDURE — 99900035 HC TECH TIME PER 15 MIN (STAT)

## 2024-10-05 PROCEDURE — 25000003 PHARM REV CODE 250: Performed by: INTERNAL MEDICINE

## 2024-10-05 PROCEDURE — 84100 ASSAY OF PHOSPHORUS: CPT | Performed by: SURGERY

## 2024-10-05 PROCEDURE — 80053 COMPREHEN METABOLIC PANEL: CPT | Performed by: SURGERY

## 2024-10-05 PROCEDURE — 27100171 HC OXYGEN HIGH FLOW UP TO 24 HOURS

## 2024-10-05 PROCEDURE — 97116 GAIT TRAINING THERAPY: CPT

## 2024-10-05 PROCEDURE — 63600175 PHARM REV CODE 636 W HCPCS: Performed by: NURSE PRACTITIONER

## 2024-10-05 PROCEDURE — 36415 COLL VENOUS BLD VENIPUNCTURE: CPT | Performed by: SURGERY

## 2024-10-05 PROCEDURE — 12000002 HC ACUTE/MED SURGE SEMI-PRIVATE ROOM

## 2024-10-05 PROCEDURE — 99900031 HC PATIENT EDUCATION (STAT)

## 2024-10-05 PROCEDURE — 94660 CPAP INITIATION&MGMT: CPT

## 2024-10-05 PROCEDURE — 85025 COMPLETE CBC W/AUTO DIFF WBC: CPT | Performed by: SURGERY

## 2024-10-05 PROCEDURE — 25000003 PHARM REV CODE 250

## 2024-10-05 RX ORDER — SEVELAMER CARBONATE 800 MG/1
2400 TABLET, FILM COATED ORAL
Status: DISCONTINUED | OUTPATIENT
Start: 2024-10-05 | End: 2024-10-06 | Stop reason: HOSPADM

## 2024-10-05 RX ORDER — DIPHENHYDRAMINE HCL 25 MG
25 CAPSULE ORAL EVERY 6 HOURS PRN
Status: DISCONTINUED | OUTPATIENT
Start: 2024-10-05 | End: 2024-10-06 | Stop reason: HOSPADM

## 2024-10-05 RX ADMIN — PIPERACILLIN SODIUM AND TAZOBACTAM SODIUM 4.5 G: 4; .5 INJECTION, POWDER, LYOPHILIZED, FOR SOLUTION INTRAVENOUS at 04:10

## 2024-10-05 RX ADMIN — OXYCODONE HYDROCHLORIDE 10 MG: 10 TABLET, FILM COATED, EXTENDED RELEASE ORAL at 08:10

## 2024-10-05 RX ADMIN — GABAPENTIN 100 MG: 100 CAPSULE ORAL at 02:10

## 2024-10-05 RX ADMIN — LINEZOLID 600 MG: 600 TABLET, FILM COATED ORAL at 08:10

## 2024-10-05 RX ADMIN — HYDRALAZINE HYDROCHLORIDE 100 MG: 25 TABLET ORAL at 02:10

## 2024-10-05 RX ADMIN — HYDRALAZINE HYDROCHLORIDE 100 MG: 25 TABLET ORAL at 08:10

## 2024-10-05 RX ADMIN — LOSARTAN POTASSIUM 100 MG: 50 TABLET, FILM COATED ORAL at 08:10

## 2024-10-05 RX ADMIN — OXYCODONE HYDROCHLORIDE AND ACETAMINOPHEN 1 TABLET: 10; 325 TABLET ORAL at 06:10

## 2024-10-05 RX ADMIN — HYDROMORPHONE HYDROCHLORIDE 4 MG: 2 TABLET ORAL at 10:10

## 2024-10-05 RX ADMIN — SEVELAMER CARBONATE 2400 MG: 800 TABLET, FILM COATED ORAL at 06:10

## 2024-10-05 RX ADMIN — GABAPENTIN 100 MG: 100 CAPSULE ORAL at 08:10

## 2024-10-05 RX ADMIN — HEPARIN SODIUM 5000 UNITS: 5000 INJECTION, SOLUTION INTRAVENOUS; SUBCUTANEOUS at 06:10

## 2024-10-05 RX ADMIN — ONDANSETRON 4 MG: 2 INJECTION INTRAMUSCULAR; INTRAVENOUS at 03:10

## 2024-10-05 RX ADMIN — METOPROLOL SUCCINATE 150 MG: 50 TABLET, FILM COATED, EXTENDED RELEASE ORAL at 08:10

## 2024-10-05 RX ADMIN — PIPERACILLIN SODIUM AND TAZOBACTAM SODIUM 4.5 G: 4; .5 INJECTION, POWDER, LYOPHILIZED, FOR SOLUTION INTRAVENOUS at 02:10

## 2024-10-05 RX ADMIN — SEVELAMER CARBONATE 1600 MG: 800 TABLET, FILM COATED ORAL at 08:10

## 2024-10-05 RX ADMIN — HEPARIN SODIUM 5000 UNITS: 5000 INJECTION, SOLUTION INTRAVENOUS; SUBCUTANEOUS at 02:10

## 2024-10-05 RX ADMIN — ISOSORBIDE MONONITRATE 30 MG: 30 TABLET, EXTENDED RELEASE ORAL at 08:10

## 2024-10-05 RX ADMIN — HEPARIN SODIUM 5000 UNITS: 5000 INJECTION, SOLUTION INTRAVENOUS; SUBCUTANEOUS at 08:10

## 2024-10-05 RX ADMIN — FLUCONAZOLE 100 MG: 100 TABLET ORAL at 08:10

## 2024-10-05 NOTE — ASSESSMENT & PLAN NOTE
Anemia is likely due to acute blood loss which was from surgery and chronic disease due to ESRD. Most recent hemoglobin and hematocrit are listed below.  Recent Labs     10/03/24  0535 10/04/24  0728 10/05/24  0331   HGB 8.5* 7.7* 8.5*   HCT 27.8* 25.1* 28.2*     Plan  - Monitor serial CBC: Daily  - Transfuse PRBC if patient becomes hemodynamically unstable, symptomatic or H/H drops below 7/21.  - Patient has received 3 units of PRBCs  - Patient's anemia is currently stable

## 2024-10-05 NOTE — ASSESSMENT & PLAN NOTE
This patient does have evidence of infective focus  My overall impression is sepsis.  Source: Abdominal  Antibiotics given-   Antibiotics (72h ago, onward)    Start     Stop Route Frequency Ordered    10/02/24 1200  linezolid tablet 600 mg         -- Oral Every 12 hours 10/02/24 1052    09/24/24 1800  piperacillin-tazobactam 4.5 g in dextrose 5 % 100 mL IVPB (ready to mix)         -- IV Every 12 hours (non-standard times) 09/24/24 1035      fluconazole (DIFLUCAN) IVPB 200 mg  Start Date/Time (Original Order): 09/22/24 1500   Ordered Dose: 200 mg Route: Intravenous Frequency: Every 24 hours (non-standard times)       Source control achieved by: IV antibiotics and surgical intervention  =======================================================  Blood culture 09/07/2024: NGTD   Urine culture 09/19/2024: NGTD   Blood culture 09/24/2024:  NGTD   Left groin drainage culture 09/24/2024:  E coli only resistant to tetracycline, cefazolin, ampicillin and ceftriaxone  Abdomen incision site culture 09/24/2024:  E coli only sensitive to ceftriaxone and tetracycline  =====================================================  WBC continues trending down   Infectious disease following   Continue current antibiotic regimen

## 2024-10-05 NOTE — PROGRESS NOTES
INPATIENT NEPHROLOGY Progress Note  [facility] NEPHROLOGY    João Ham  10/05/2024    Reason for consultation:  ESRD    Chief Complaint:   Chief Complaint   Patient presents with    Fatigue    Groin Swelling     X 2 days.  Hx of Kidney failure and CHF     History of Present Illness:  Per H and P    Patient is a 44 year old male with history of ESDR on dialysis MWF, awaiting kidney transplant, HTN, presented to ED with 3 days history of left groin pain and swelling. States swelling comes every time he cough and is being painful. Patient has low grade fever 99s at home. He has been vomiting bilious fluid last 2-3 days. No diarrhea or abdominal pain.      In the ED CT abdomen showed Left inguinal hernia containing fat and air as well as marked inflammation extending from inside the pelvis, in the hernia and down into the left scrotum. This is consistent with an infected inguinal hernia, possible gangrene. WBC was 14, patient was tachycardic. General surgery was consulted and patient was admitted under hospitalist.     9/19  avf nonfunctional.   No sob or chest pain.  Has post op pain.  AFVSS  9/20  afvss.  Pt doesn't want to see previous vascular surgeon who put his avf in.  No alternative available.  Transfer center called.  Has temporary trialysis catheter.  Patient seen on hemodialysis for uremic clearance and ultrafiltration.    9/21  2.5L off w/HD yesterday.  Tmax 101.8, pressures ok.  Lab results reviewed, Hgb low but better than yesterday.  C/o post op pain, no other complaints.  9/22 POD 5.  VSS, lab results reviewed.  Hgb 7.6, added epo to HD orders.  C/o gas pains, plans to move around more today.  Next HD tomorrow.  9/23 VSS. HD today. Transfuse with next HD as needed if Hgb stil low.  9/24 VSS. Appreciate input from Urology, Gen Surgery, Vascular Surgery, ID on this complex patient. Plan for fistulogram tomorrow. CXR yesterday shows trialysis line tip in appropriate cocation. Will attempt HD today since  we skipped yesterday due to nurse and patient concerns that the line may be malpositioned.  9/25 VSS. s/p cephalic vein dilation by Dr. Robledo yesterday, tolerated HD very well. Hold HD today. Hypotension this AM, low grade fever yesterday, WBC elevated, received IVF bolus and Midodrine. Continues to be infected per Surgery, we can remove trialysis line now that AVF is treated and usable... Consider ICU. Stopped hydralazine and olmesartan, decreased Clonidine to 0.1 BID, not sure what to do with Metoprolol 150mg and Hydralazine 120mg... He may be sob/hypoxic due to anemia, suggest 1 PRBC instead of IVF, since Hgb is 7.    Addendum @ 10:47 AM  Seen at bedside in ICU with Dr. Rodriguez and Dr. Au. Mother present as well. Reviewed imaging. Agree with plan for urgent ex lap, remove central line and place mid line, keep current abx, hold on transfusion and dialysis and reassess later. ABG and fresh set of cx now, re-evalaute later.    Addendum @ 4:36 PM  Discussed briefly with Dr. Connors, dialysis nurse Brant and with ICU nurse. Patient had colostomy placed due to anastomotic leak and had 2 PRBC given. Upon return to floor was hypertensive with SBP > 200 and NPO. Advised Cleviprex drip for now (earlier to day he was on max dose Clonidine, mad dose Hydralazine, max dose Olmesartan, 150mg Metoprolol-XL and 120mg of Imdur - none of which he can get due to NPO) and will do urgent HD with UF 2L or so as tolerated.  9/26  POD 1 s/p . Exploratory laparotomy,. Colon resection end-colostomy, Mobilization of splenic flexure, and left inguinal canal exploration.  Post pain. No sob.  Sleepy.    9/27  AFVSS.  Some post op pain.  No sob.  No angina.   Patient seen on hemodialysis for uremic clearance and ultrafiltration.    9/28 s/p 3.8 liter uf yesterday.     AFVSS.  Post op pain.  Dry cough.  No sob.  Projectile vomiting earlier today per patient  9/30 VSS. HD today.  10/1 GS note reviewed, working on pain control, tolerating  diet  10/2 no major events overnight- due for HD today  10/3 fever, tachycardia, abd pain and stagnant leukocytosis yest- CT with possible fluid collection- may need IR drainage- remains on IV antbx  10/4 fluid collection neg for infection- working on discharge plan to NSR today- will do HD before discharge- pain control ongoing issue- hospital medicine aware  10/5  Plan is now d/c to rehab tomorrow, working on pain control.  2.5L UF w/HD yesterday.  VSS, lab results reviewed.  Added on PO4 level to labs--hasn't had one for several days--resulted at 8.5, so increased renvela dose.  Notified pt that dosing may change based on lab results.    Plan of Care:    ESRD on HD MWF  --continue dialysis per routine    Anemia of CKD  --Hb drop- bump LOR with HD today  --transfused this admission- no acute transfusion needs today    Secondary HPT  --renal diet  --continue binders with meals    Hypertension  --continue current BP meds  --uf with hd as needed    Hyponatremia  Hyperkalemia  Acidosis  --adjust dialysate  --renal diet    AVF malfunction fixed  --appreciate Vascular eval, s/p fistulogram and cephalic vein stenosis dilation on 9/23 by Dr. Salvador.    Inguinal hernia of left side with gangrene - W/ Colonic perforation with abscess   --S/P Robotic sigmoid resection, Mobilization of splenic flexure, left inguinal canal exploration, and I&D 9/17 per Dr. Connors   --dose antbx for CrCl < 10/HD  --CT 10/2 with new fluid collection- IR aspiration neg for infection    Thank you for allowing us to participate in this patient's care. We will continue to follow.    Vital Signs:  Temp Readings from Last 3 Encounters:   10/05/24 98 °F (36.7 °C)   08/22/24 97.3 °F (36.3 °C) (Temporal)   07/30/24 98.6 °F (37 °C) (Oral)       Pulse Readings from Last 3 Encounters:   10/05/24 77   08/29/24 (!) 115   08/22/24 110       BP Readings from Last 3 Encounters:   10/05/24 102/63   08/29/24 99/68   08/22/24 (!) 137/91       Weight:  Wt Readings  from Last 3 Encounters:   09/18/24 134 kg (295 lb 6.7 oz)   08/29/24 131.3 kg (289 lb 7.4 oz)   08/22/24 131 kg (288 lb 12.8 oz)       Medications:  No current facility-administered medications on file prior to encounter.     Current Outpatient Medications on File Prior to Encounter   Medication Sig Dispense Refill    metoprolol succinate (TOPROL-XL) 50 MG 24 hr tablet Take 150 mg by mouth once daily.      olmesartan (BENICAR) 40 MG tablet Take 1 tablet by mouth once daily 90 tablet 0    sevelamer carbonate (RENVELA) 800 mg Tab Take 2 tablets (1,600 mg total) by mouth 3 (three) times daily with meals. 180 tablet 11     Scheduled Meds:   epoetin mily-epbx  100 Units/kg Intravenous Every Mon, Wed, Fri    fluconazole  100 mg Oral Daily    gabapentin  100 mg Oral TID    heparin (porcine)  5,000 Units Subcutaneous Q8H    hydrALAZINE  100 mg Oral Q8H    HYDROmorphone  1 mg Intravenous Once    isosorbide mononitrate  30 mg Oral Daily    ketoconazole   Topical (Top) Every Other Day    LIDOcaine-prilocaine   Topical (Top) Once    linezolid  600 mg Oral Q12H    losartan  100 mg Oral Daily    metoprolol succinate  150 mg Oral Daily    miconazole   Topical (Top) BID    oxyCODONE  10 mg Oral Q12H    piperacillin-tazobactam (Zosyn) IV (PEDS and ADULTS) (extended infusion is not appropriate)  4.5 g Intravenous Q12H    sevelamer carbonate  1,600 mg Oral TID WM     Continuous Infusions:        PRN Meds:.  Current Facility-Administered Medications:     0.9% NaCl, , Intravenous, PRN    acetaminophen, 650 mg, Oral, Q4H PRN    ALPRAZolam, 0.25 mg, Oral, Nightly PRN    aluminum-magnesium hydroxide-simethicone, 30 mL, Oral, QID PRN    cloNIDine, 0.3 mg, Oral, TID PRN    dextrose 10%, 12.5 g, Intravenous, PRN    dextrose 10%, 12.5 g, Intravenous, PRN    dextrose 10%, 12.5 g, Intravenous, PRN    dextrose 10%, 25 g, Intravenous, PRN    dextrose 10%, 25 g, Intravenous, PRN    dextrose 10%, 25 g, Intravenous, PRN    diphenhydrAMINE, 25 mg,  "Oral, Q6H PRN    glucagon (human recombinant), 1 mg, Intramuscular, PRN    glucose, 16 g, Oral, PRN    glucose, 24 g, Oral, PRN    heparin (porcine), 4,000 Units, Intravenous, PRN    HYDROmorphone, 4 mg, Oral, Q4H PRN    insulin aspart U-100, 0-5 Units, Subcutaneous, QID (AC + HS) PRN    iohexoL, 100 mL, Intravenous, ONCE PRN    LIDOcaine-prilocaine, , Topical (Top), PRN    magnesium oxide, 800 mg, Oral, PRN    magnesium oxide, 800 mg, Oral, PRN    melatonin, 6 mg, Oral, Nightly PRN    naloxone, 0.02 mg, Intravenous, PRN    ondansetron, 4 mg, Intravenous, Q6H PRN    oxyCODONE-acetaminophen, 1 tablet, Oral, Q4H PRN    potassium bicarbonate, 35 mEq, Oral, PRN    potassium bicarbonate, 50 mEq, Oral, PRN    potassium bicarbonate, 60 mEq, Oral, PRN    potassium, sodium phosphates, 2 packet, Oral, PRN    potassium, sodium phosphates, 2 packet, Oral, PRN    potassium, sodium phosphates, 2 packet, Oral, PRN    promethazine (PHENERGAN) 12.5 mg in 0.9% NaCl 50 mL IVPB, 12.5 mg, Intravenous, Q6H PRN    simethicone, 1 tablet, Oral, TID PRN    sodium chloride 0.9%, 250 mL, Intravenous, PRN    sodium chloride 0.9%, 3 mL, Intravenous, Q12H PRN    Review of Systems:  Neg    Physical Exam:    /63   Pulse 77   Temp 98 °F (36.7 °C)   Resp 18   Ht 6' 2" (1.88 m)   Wt 134 kg (295 lb 6.7 oz)   SpO2 96%   BMI 37.93 kg/m²     General Appearance:    Alert, cooperative, no distress, appears stated age   Head:    Normocephalic, without obvious abnormality, atraumatic   Eyes:    PER, conjunctiva/corneas clear, EOM's intact in both eyes        Throat:   Lips, mucosa, and tongue normal; teeth and gums normal   Back:     Symmetric, no curvature, ROM normal, no CVA tenderness   Lungs:     Clear to auscultation bilaterally, respirations unlabored   Chest wall:    No tenderness or deformity   Heart:    Regular rate and rhythm, S1 and S2 normal, no murmur, rub   or gallop   Abdomen:     Soft, non-tender, bowel sounds active all four " quadrants,     no masses, no organomegaly   Extremities:   Extremities normal, atraumatic, no cyanosis or edema   Pulses:   2+ and symmetric all extremities   MSK:   No joint or muscle swelling, tenderness or deformity   Skin:   Skin color, texture, turgor normal, no rashes or lesions   Neurologic:   CNII-XII intact, normal strength and sensation       Throughout.  No flap     Results:  Recent Labs   Lab 10/03/24  0535 10/04/24  0728 10/05/24  0331   * 130* 129*   K 4.5 4.4 4.7   CL 89* 89* 94*   CO2 23 22* 18*   BUN 37* 53* 41*   CREATININE 12.7* 15.0* 12.4*   * 106 103       Recent Labs   Lab 10/03/24  0535 10/04/24  0728 10/05/24  0331   CALCIUM 9.4 9.5 9.1   ALBUMIN 3.6 3.4* 3.7   MG 2.3 2.3 2.4       Recent Labs   Lab 02/02/23  0745 05/19/23  1022 06/19/23  1031   PTH, Intact 225.6 H 335.8 H 319.0 H       Recent Labs   Lab 10/02/24  1643 10/02/24  2102 10/03/24  0703 10/03/24  1116 10/03/24  1643 10/03/24  1918 10/04/24  0728 10/04/24  1719 10/05/24  0653 10/05/24  1127   POCTGLUCOSE 146* 141* 152* 99 130* 130* 141* 134* 108 104       Recent Labs   Lab 10/10/22  2005 06/22/23  0446 07/16/24  0510   Hemoglobin A1C 5.5 5.5 6.1       Recent Labs   Lab 10/03/24  0535 10/04/24  0728 10/05/24  0331   WBC 15.81* 10.97 9.81   HGB 8.5* 7.7* 8.5*   HCT 27.8* 25.1* 28.2*   * 448 365   MCV 99* 95 100*   MCHC 30.6* 30.7* 30.1*   MONO 8.0 12.6  1.4* 14.0  1.4*   EOSINOPHIL 0.0 2.1 2.0       Recent Labs   Lab 10/03/24  0535 10/04/24  0728 10/05/24  0331   BILITOT 0.4 0.4 0.4   PROT 9.1* 8.4 8.9*   ALBUMIN 3.6 3.4* 3.7   ALKPHOS 118 91 101   ALT 35 25 23   AST 31 21 21       Recent Labs   Lab 10/10/22  1645 01/09/23  1123 06/19/23  1622 07/17/24  0505 09/19/24  1435   Color, UA Yellow   < > Straw Yellow Yellow   Appearance, UA Clear   < > Clear Hazy A Clear   pH, UA 6.0   < > 6.0 6.0 8.0   Specific Gravity, UA 1.025   < > 1.010 1.020 1.010   Protein, UA 2+ A   < > 2+ A 3+ A 2+ A   Glucose, UA Negative   <  > Negative Negative Negative   Ketones, UA Negative   < > Negative Negative Negative   Urobilinogen, UA Negative  --   --  Negative Negative   Bilirubin (UA) Negative   < > Negative Negative Negative   Occult Blood UA 1+ A   < > Negative 2+ A 2+ A   Nitrite, UA Negative   < > Negative Negative Negative   RBC, UA 1   < > 0 6 H 4   WBC, UA 0   < > 2 52 H 21 H   Bacteria None   < > None None None   Hyaline Casts, UA 0   < > 0 0 0    < > = values in this interval not displayed.       Recent Labs   Lab 07/15/24  1824 09/17/24  0928 09/25/24  1051   POC PH  --   --  7.404   POC PCO2  --   --  37.7   POC HCO3  --   --  23.6 L   POC PO2  --   --  87   POC SATURATED O2  --   --  97   POC BE  --   --  -1   Sample VENOUS VENOUS ARTERIAL       Microbiology Results (last 7 days)       Procedure Component Value Units Date/Time    Culture, Anaerobic [[id number]] Collected: 10/03/24 1053    Order Status: Completed Specimen: Body Fluid from Abdomen Updated: 10/05/24 0952     Anaerobic Culture No anaerobes isolated    Narrative:      Abdomen, Intra Abdominal Fluid.    Culture, Body Fluid (Aerobic) w/ GS [[id number]] Collected: 10/03/24 1053    Order Status: Completed Specimen: Body Fluid Updated: 10/05/24 0952     AEROBIC CULTURE - FLUID No growth     Gram Stain Result Rare WBC's      No organisms seen    Narrative:      Abdomen, Intra Abdominal Fluid.    Aerobic culture [[id number]] Collected: 10/03/24 1053    Order Status: Canceled Specimen: Body Fluid from Abdomen     Fungus culture [[id number]] Collected: 09/25/24 1413    Order Status: Completed Specimen: Incision site from Abdomen Updated: 10/03/24 0444     Fungus (Mycology) Culture No fungal growth to date    Culture, Anaerobe [[id number]]  (Abnormal) Collected: 09/25/24 1413    Order Status: Completed Specimen: Incision site from Abdomen Updated: 10/01/24 1418     Anaerobic Culture PARABACTEROIDES DISTASONIS  Few  Beta Lactamase positive      Blood culture [[id number]]  Collected: 09/25/24 1111    Order Status: Completed Specimen: Blood from Peripheral, Antecubital, Right Updated: 09/30/24 1432     Blood Culture, Routine No growth after 5 days.    Narrative:      [id number]    Blood culture [[id number]] Collected: 09/25/24 1111    Order Status: Completed Specimen: Blood from Peripheral, Forearm, Right Updated: 09/30/24 1432     Blood Culture, Routine No growth after 5 days.    Narrative:      [id number]    Blood culture [[id number]] Collected: 09/24/24 1513    Order Status: Completed Specimen: Blood Updated: 09/29/24 1632     Blood Culture, Routine No growth after 5 days.    Narrative:      Collection has been rescheduled by [email or web address] at 09/23/2024 14:35 Reason:   Patient unavailable dialysis   Collection has been rescheduled by MYB at 09/23/2024 18:44 Reason:   Unable to collect  Collection has been rescheduled by CZB at 09/23/2024 19:02 Reason:   Unable to collect  Collection has been rescheduled by RE1 at 09/24/2024 09:43 Reason:   Spoke to the patient's nurse about unable to collect she is   contacting Lambert and the doctor  Collection has been rescheduled by [email or web address] at 09/23/2024 14:35 Reason:   Patient unavailable dialysis   Collection has been rescheduled by MYB at 09/23/2024 18:44 Reason:   Unable to collect  Collection has been rescheduled by CZB at 09/23/2024 19:02 Reason:   Unable to collect  Collection has been rescheduled by RE1 at 09/24/2024 09:43 Reason:   Spoke to the patient's nurse about unable to collect she is   contacting Lambert and the doctor    Blood culture [[id number]] Collected: 09/24/24 1122    Order Status: Completed Specimen: Blood Updated: 09/29/24 1232     Blood Culture, Routine No growth after 5 days.    Narrative:      Collection has been rescheduled by [email or web address] at 09/23/2024 14:35 Reason:   Patient unavailable dialysis   Collection has been rescheduled by MYB at 09/23/2024 18:44 Reason:   Unable to collect  Collection has been rescheduled by CZB at 09/23/2024 19:02 Reason:    Unable to collect  Collection has been rescheduled by RE1 at 09/24/2024 09:43 Reason:   Spoke to the patient's nurse about unable to collect she is   contacting Lambert and the doctor  Collection has been rescheduled by [email or web address] at 09/23/2024 14:35 Reason:   Patient unavailable dialysis   Collection has been rescheduled by MYB at 09/23/2024 18:44 Reason:   Unable to collect  Collection has been rescheduled by CZB at 09/23/2024 19:02 Reason:   Unable to collect  Collection has been rescheduled by RE1 at 09/24/2024 09:43 Reason:   Spoke to the patient's nurse about unable to collect she is   contacting Lambert and the doctor    AFB Culture & Smear [[id number]] Collected: 09/25/24 1413    Order Status: Completed Specimen: Incision site from Abdomen Updated: 09/28/24 1630     AFB CULTURE STAIN No acid fast bacilli seen.     AFB CULTURE STAIN Testing performed by:     AFB CULTURE STAIN Lab [facility] [place]     AFB [place]ULTURE ST[place] [address] [facility] [place] [address]     AFB CULTURE STAIN Dr. Osbaldo Sherwood MD          Patient care time was spent personally by me on the following activities: > 35 minutes  Obtaining a history.  Examination of patient.  Providing medical care at the patients bedside.  Developing a treatment plan with patient or surrogate and bedside caregivers.  Ordering and reviewing laboratory studies, radiographic studies, pulse oximetry.  Ordering and performing treatments and interventions.  Evaluation of patient's response to treatment.  Discussions with consultants while on the unit and immediately available to the patient.  Re-evaluation of the patient's condition.  Documentation in the medical record.      Kellie Doty NP    [facility]  [phone]

## 2024-10-05 NOTE — SUBJECTIVE & OBJECTIVE
Interval History: Patient assessed at bedside NAD and VSS. He reports that he walked around the nurse's station 5 times yesterday but is still having a good amount of pain. He states he does not feel like his pain is well controlled at night. Discussed changing frequency of medication, however, on chart review his PRN dilaudid is ordered q4h and he has not had it since 10/4 0719, so I will not make it more frequent.    Review of Systems   Constitutional:  Positive for appetite change. Negative for chills and fever.   HENT:  Negative for congestion.    Eyes:  Negative for visual disturbance.   Respiratory:  Negative for shortness of breath.    Cardiovascular:  Negative for chest pain.   Gastrointestinal:  Positive for abdominal pain and nausea. Negative for vomiting.   Genitourinary:         HD patient   Musculoskeletal:  Negative for gait problem.   Skin:  Positive for wound.   Neurological:  Negative for dizziness and light-headedness.   Psychiatric/Behavioral:  Negative for agitation and confusion. The patient is not nervous/anxious.      Objective:     Vital Signs (Most Recent):  Temp: 98 °F (36.7 °C) (10/05/24 0708)  Pulse: 93 (10/05/24 0828)  Resp: 17 (10/05/24 0828)  BP: 109/66 (10/05/24 0805)  SpO2: 96 % (10/05/24 0828) Vital Signs (24h Range):  Temp:  [98 °F (36.7 °C)-98.2 °F (36.8 °C)] 98 °F (36.7 °C)  Pulse:  [74-93] 93  Resp:  [16-19] 17  SpO2:  [95 %-100 %] 96 %  BP: ()/(44-70) 109/66     Weight: 134 kg (295 lb 6.7 oz)  Body mass index is 37.93 kg/m².    Intake/Output Summary (Last 24 hours) at 10/5/2024 1139  Last data filed at 10/4/2024 1350  Gross per 24 hour   Intake 500 ml   Output 3000 ml   Net -2500 ml         Physical Exam  Constitutional:       General: He is not in acute distress.     Appearance: He is ill-appearing.   HENT:      Head: Normocephalic and atraumatic.   Eyes:      Extraocular Movements: Extraocular movements intact.      Pupils: Pupils are equal, round, and reactive to  light.   Cardiovascular:      Rate and Rhythm: Normal rate and regular rhythm.      Pulses: Normal pulses.      Heart sounds: Normal heart sounds.   Pulmonary:      Effort: Pulmonary effort is normal. No respiratory distress.      Breath sounds: Normal breath sounds. No wheezing, rhonchi or rales.   Abdominal:      General: The ostomy site is clean. Bowel sounds are normal. There is no distension.      Palpations: Abdomen is soft.      Tenderness: There is abdominal tenderness. There is no guarding or rebound.   Musculoskeletal:      Cervical back: No rigidity.      Right lower leg: No edema.      Left lower leg: No edema.   Skin:     General: Skin is warm.      Comments: Midline abd surgical incision with some dried bloody drainage, no dehiscence noted   Neurological:      Mental Status: He is alert and oriented to person, place, and time.   Psychiatric:         Mood and Affect: Mood normal.         Behavior: Behavior normal.         Thought Content: Thought content normal.         Judgment: Judgment normal.             Significant Labs: All pertinent labs within the past 24 hours have been reviewed.    Significant Imaging: I have reviewed all pertinent imaging results/findings within the past 24 hours.

## 2024-10-05 NOTE — ASSESSMENT & PLAN NOTE
W/ Colonic perforation with abscess  CT abdomen shows Left inguinal hernia containing fat and air as well as marked inflammation extending from inside the pelvis, in the hernia and down into the left scrotum.   S/P Robotic sigmoid resection, Mobilization of splenic flexure, left inguinal canal exploration, and I&D 9/17 per Dr. Connors with repeat OR visit for anastomotic leak on 9/25  Continue regular diet:  Started 9/30  Still working on pain control, continue OxyContin twice daily in addition to the p.o. Dilaudid p.r.n.

## 2024-10-05 NOTE — PT/OT/SLP PROGRESS
Physical Therapy Treatment    Patient Name:  João Ham   MRN:  [id number]    Recommendations:     Discharge Recommendations: High Intensity Therapy  Discharge Equipment Recommendations: to be determined by next level of care  Barriers to discharge:  decrease activity tolerance, high fall risk    Assessment:     João Ham is a 44 y.o. male admitted with a medical diagnosis of Inguinal hernia of left side with gangrene.  He presents with the following impairments/functional limitations: weakness, impaired endurance, impaired self care skills, impaired functional mobility, gait instability, impaired balance, pain.    Pt found in bed with HOB elevated. Pt ambulated 100 ft x 5 with RW and supervision initially but increase to CGA with increase ambulation due to fatigue and decrease activity tolerance. No loss of balance or shortness of breath but noted significant fatigue post ambulation. Pt to benefit from ongoing skilled PT services to progress to ambulation with no AD as this is the patient's baseline    Rehab Prognosis: Fair; patient would benefit from acute skilled PT services to address these deficits and reach maximum level of function.    Recent Surgery: Procedure(s) (LRB):  LAPAROTOMY, EXPLORATORY (N/A)  COLON RESECTION  MOBILIZATION, SPLENIC FLEXURE 10 Days Post-Op    Plan:     During this hospitalization, patient to be seen daily to address the identified rehab impairments via therapeutic activities, therapeutic exercises, gait training and progress toward the following goals:    Plan of Care Expires:  11/05/24    Subjective     Chief Complaint: fatigue  Patient/Family Comments/goals: go to rehab  Pain/Comfort:  Pain Rating 1: 0/10      Objective:     Communicated with RN prior to session.  Patient found HOB elevated with peripheral IV, telemetry, BERNABE drain upon PT entry to room.     General Precautions: Standard, fall  Orthopedic Precautions: N/A  Braces: N/A  Respiratory Status: Room air     Functional  Mobility:  Bed Mobility:     Supine to Sit: contact guard assistance  Transfers:     Sit to Stand:  contact guard assistance with rolling walker  Gait: 100 ft x 5 with RW and Supervision initially but increase to CGA with increase fatigue and decrease activity tolerance      AM-PAC 6 CLICK MOBILITY  Turning over in bed (including adjusting bedclothes, sheets and blankets)?: 3  Sitting down on and standing up from a chair with arms (e.g., wheelchair, bedside commode, etc.): 3  Moving from lying on back to sitting on the side of the bed?: 3  Moving to and from a bed to a chair (including a wheelchair)?: 3  Need to walk in hospital room?: 3  Climbing 3-5 steps with a railing?: 2  Basic Mobility Total Score: 17       Treatment & Education:  Pt educated on POC, discharge recommendation, need for assist with mobility, importance of time Oob, pacing/energy conservation, use of call bell to seek assistance as needed and fall prevention      Patient left sitting edge of bed with all lines intact, call button in reach, and bed alarm on..    GOALS:   Multidisciplinary Problems       Physical Therapy Goals          Problem: Physical Therapy    Goal Priority Disciplines Outcome Interventions   Physical Therapy Goal     PT, PT/OT Progressing    Description: 1. Supine to sit with Stand-by Assistance  2. Sit to stand transfer with Stand-by Assistance  3.. Bed to chair transfer with Supervision using Rolling Walker  4. Gait  x 100 feet with Minimal Assistance using least restrictive assistive device.                          Time Tracking:     PT Received On: 10/05/24  PT Start Time: 1035     PT Stop Time: 1054  PT Total Time (min): 19 min     Billable Minutes: Gait Training 19    Treatment Type: Treatment  PT/PTA: PT     Number of PTA visits since last PT visit: 0     10/05/2024

## 2024-10-05 NOTE — ASSESSMENT & PLAN NOTE
Patient with Acute debility due to bed confinement status and prolonged hospitalization, and infection . Latest AMPAC and GEMS scores have not been reviewed. Plan includes progressive mobility protocol initated, PT/OT consulted, fall precautions in place, and CM following for discharge planning .  Accepted to [facility] rehab-they plan to admit Sunday

## 2024-10-05 NOTE — ASSESSMENT & PLAN NOTE
Creatine stable for now. BMP reviewed- noted Estimated Creatinine Clearance: 11.2 mL/min (A) (based on SCr of 12.4 mg/dL (H)). according to latest data. Based on current GFR, CKD stage is end stage.  Monitor UOP and serial BMP and adjust therapy as needed. Renally dose meds. Avoid nephrotoxic medications and procedures.  Nephrology following  Dialysis management per Nephrology  Renal diet

## 2024-10-05 NOTE — ASSESSMENT & PLAN NOTE
Chronic, controlled. Latest blood pressure and vitals reviewed-     Temp:  [98 °F (36.7 °C)-98.2 °F (36.8 °C)]   Pulse:  [74-93]   Resp:  [16-19]   BP: ()/(44-70)   SpO2:  [95 %-100 %] .   Home meds for hypertension were reviewed and noted below.   Hypertension Medications               cloNIDine (CATAPRES) 0.3 MG tablet TAKE 1 TABLET BY MOUTH THREE TIMES DAILY    hydrALAZINE (APRESOLINE) 100 MG tablet Take 1 tablet (100 mg total) by mouth 3 (three) times daily.    isosorbide mononitrate (IMDUR) 120 MG 24 hr tablet Take 120 mg by mouth once daily.    metoprolol succinate (TOPROL-XL) 50 MG 24 hr tablet Take 150 mg by mouth once daily.    olmesartan (BENICAR) 40 MG tablet Take 1 tablet by mouth once daily        While in the hospital, will manage blood pressure as follows; Continue current antihypertensive regimen: hydralazine, losartan, clonidine, and metoprolol.  Patient was apparently getting nitro paste q.6 hours.  This was discontinued and his home isosorbide was resumed.    Will utilize p.r.n. blood pressure medication only if patient's blood pressure greater than 180/110 and he develops symptoms such as worsening chest pain or shortness of breath.

## 2024-10-05 NOTE — PROGRESS NOTES
[facility] Medicine  Progress Note    Patient Name: João Ham  MRN: [id number]  Patient Class: IP- Inpatient   Admission Date: 9/17/2024  Length of Stay: 18 days  Attending Physician: Ag Reyes, *  Primary Care Provider: Dawson Granado MD        Subjective:     Principal Problem:Inguinal hernia of left side with gangrene        HPI:  Patient is a 44 year old male with history of ESDR on dialysis MWF, awaiting kidney transplant, HTN, presented to ED with 3 days history of left groin pain and swelling. States swelling comes every time he cough and is being painful. Patient has low grade fever 99s at home. He has been vomiting bilious fluid last 2-3 days. No diarrhea or abdominal pain.     In the ED CT abdomen showed Left inguinal hernia containing fat and air as well as marked inflammation extending from inside the pelvis, in the hernia and down into the left scrotum. This is consistent with an infected inguinal hernia, possible gangrene. WBC was 14, patient was tachycardic. General surgery was consulted and patient was admitted under hospitalist.     Overview/Hospital Course:  Patient is a 44 year old male with history of ESRD, was admitted with suspected inguinal hernia gangrene. General surgery was consulted and patient was taken to OR. Found to have colonic perforation due to mesh erosion with an abscess. Patient underwent sigmoid resection and drainage of the abscess, was started on clindamycin, Vancomycin and cefepime. Nephrology was consulted for chronic dialysis. AVF was not functioning, General surgery consulted, trialysis catheter placed. PRBC transfused during dialysis for low Hb 6.6.  While on broad-spectrum coverage, patient is spiking fevers and worsening leukocytosis, id consulted, discontinued clindamycin, vancomycin and cefepime-added daptomycin, Diflucan and Zosyn.  Repeated CT abdomen and pelvis that showed residual abscess/phlegmon and contained  perforation. Re-consulted surgery who mentioned likely post op changes. Fistulogram 9/24 by vascular surgery for declotting the AV fistula. Trialysis removed 9/25. Had worsening white count, fevers upto 103.5 and hypotension overnight 9/25.  Also had hemoglobin of 7, with worsening hypoxia up to 7 L (overnight desaturated to 70% with lethargy requiring BiPAP placement), after which nephrology recommended 1 unit PRBC transfusion.  Id, Nephrology, Urology, General surgery were updated about his worsening condition.We CT repeated that showed intraperitoneal free air and findings suggestive of necrotizing fasciitis, general surgery was consulted stat, patient was transferred to ICU.  The surgeon performed open laparotomy and noted fecal contamination of the left lower quadrant indicative of anastomotic disruption.  Colon was resected, and colostomy was created. Patient was placed on full liquid diet, was unable to tolerate and downgraded back to clear liquid diet.  Also complaining of excess mucus and cough.  Patient encouraged to use incentive spirometer as directed, and press pillow to abdomen when coughing.  Pulmonology signed off on 09/29 with recommendation to continue BiPAP nightly and with naps.  PT/OT recommended high-intensity therapy due to patient's deconditioning and discharge planning was started.  Pain regimen was adjusted.  Tachycardia and stagnant white blood cell count noted with low-grade temps-a CT abdomen and pelvis was pursued on 10/02.  This was revealing for 2 new fluid collections.  These were discussed with general surgery and IR.  IR did do an aspirate of the collection they felt easily accessible, this was negative on Gram stain with cultures pending.  Did not feel either fluid collection with rim enhancing or indicative of an abscess.  General surgery agreed.  Leukocytosis improved.  Temperature is improved.  He had issues with ongoing pain control and long-acting pain regimen was added.  Patient cleared for discharge from surgical standpoint with f/u in 1-2 weeks.    Interval History: Patient assessed at bedside NAD and VSS. He reports that he walked around the nurse's station 5 times yesterday but is still having a good amount of pain. He states he does not feel like his pain is well controlled at night. Discussed changing frequency of medication, however, on chart review his PRN dilaudid is ordered q4h and he has not had it since 10/4 0719, so I will not make it more frequent.    Review of Systems   Constitutional:  Positive for appetite change. Negative for chills and fever.   HENT:  Negative for congestion.    Eyes:  Negative for visual disturbance.   Respiratory:  Negative for shortness of breath.    Cardiovascular:  Negative for chest pain.   Gastrointestinal:  Positive for abdominal pain and nausea. Negative for vomiting.   Genitourinary:         HD patient   Musculoskeletal:  Negative for gait problem.   Skin:  Positive for wound.   Neurological:  Negative for dizziness and light-headedness.   Psychiatric/Behavioral:  Negative for agitation and confusion. The patient is not nervous/anxious.      Objective:     Vital Signs (Most Recent):  Temp: 98 °F (36.7 °C) (10/05/24 0708)  Pulse: 93 (10/05/24 0828)  Resp: 17 (10/05/24 0828)  BP: 109/66 (10/05/24 0805)  SpO2: 96 % (10/05/24 0828) Vital Signs (24h Range):  Temp:  [98 °F (36.7 °C)-98.2 °F (36.8 °C)] 98 °F (36.7 °C)  Pulse:  [74-93] 93  Resp:  [16-19] 17  SpO2:  [95 %-100 %] 96 %  BP: ()/(44-70) 109/66     Weight: 134 kg (295 lb 6.7 oz)  Body mass index is 37.93 kg/m².    Intake/Output Summary (Last 24 hours) at 10/5/2024 1139  Last data filed at 10/4/2024 1350  Gross per 24 hour   Intake 500 ml   Output 3000 ml   Net -2500 ml         Physical Exam  Constitutional:       General: He is not in acute distress.     Appearance: He is ill-appearing.   HENT:      Head: Normocephalic and atraumatic.   Eyes:      Extraocular Movements:  Extraocular movements intact.      Pupils: Pupils are equal, round, and reactive to light.   Cardiovascular:      Rate and Rhythm: Normal rate and regular rhythm.      Pulses: Normal pulses.      Heart sounds: Normal heart sounds.   Pulmonary:      Effort: Pulmonary effort is normal. No respiratory distress.      Breath sounds: Normal breath sounds. No wheezing, rhonchi or rales.   Abdominal:      General: The ostomy site is clean. Bowel sounds are normal. There is no distension.      Palpations: Abdomen is soft.      Tenderness: There is abdominal tenderness. There is no guarding or rebound.   Musculoskeletal:      Cervical back: No rigidity.      Right lower leg: No edema.      Left lower leg: No edema.   Skin:     General: Skin is warm.      Comments: Midline abd surgical incision with some dried bloody drainage, no dehiscence noted   Neurological:      Mental Status: He is alert and oriented to person, place, and time.   Psychiatric:         Mood and Affect: Mood normal.         Behavior: Behavior normal.         Thought Content: Thought content normal.         Judgment: Judgment normal.             Significant Labs: All pertinent labs within the past 24 hours have been reviewed.    Significant Imaging: I have reviewed all pertinent imaging results/findings within the past 24 hours.    Assessment/Plan:      * Inguinal hernia of left side with gangrene  W/ Colonic perforation with abscess  CT abdomen shows Left inguinal hernia containing fat and air as well as marked inflammation extending from inside the pelvis, in the hernia and down into the left scrotum.   S/P Robotic sigmoid resection, Mobilization of splenic flexure, left inguinal canal exploration, and I&D 9/17 per Dr. Connors with repeat OR visit for anastomotic leak on 9/25  Continue regular diet:  Started 9/30  Still working on pain control, continue OxyContin twice daily in addition to the p.o. Dilaudid p.r.n.    Bleeding from the nose  -humidify the  bipap  -Afrin BID  -Not acutely bleeding on my exam today  -Heparin SQ was held:  Resolved. Resumed 10/4   -No bleeding since      Insomnia  Was started on  Xanax 0.25 mg q.h.s. PRN insomnia      Debility  Patient with Acute debility due to bed confinement status and prolonged hospitalization, and infection . Latest AMPAC and GEMS scores have not been reviewed. Plan includes progressive mobility protocol initated, PT/OT consulted, fall precautions in place, and CM following for discharge planning .  Accepted to [facility]-they plan to admit Sunday    Orchitis  Urology consulted: no surgical/invasive intervention recommended  S/P left inguinal canal exploration with Penrose drain placed in the inguinal canal 9/25 per general surgeon.  Continue antibiotics per Infectious Disease thru 10/9    Sepsis  This patient does have evidence of infective focus  My overall impression is sepsis.  Source: Abdominal  Antibiotics given-   Antibiotics (72h ago, onward)      Start     Stop Route Frequency Ordered    10/02/24 1200  linezolid tablet 600 mg         -- Oral Every 12 hours 10/02/24 1052    09/24/24 1800  piperacillin-tazobactam 4.5 g in dextrose 5 % 100 mL IVPB (ready to mix)         -- IV Every 12 hours (non-standard times) 09/24/24 1035        fluconazole (DIFLUCAN) IVPB 200 mg  Start Date/Time (Original Order): 09/22/24 1500   Ordered Dose: 200 mg Route: Intravenous Frequency: Every 24 hours (non-standard times)       Source control achieved by: IV antibiotics and surgical intervention  =======================================================  Blood culture 09/07/2024: NGTD   Urine culture 09/19/2024: NGTD   Blood culture 09/24/2024:  NGTD   Left groin drainage culture 09/24/2024:  E coli only resistant to tetracycline, cefazolin, ampicillin and ceftriaxone  Abdomen incision site culture 09/24/2024:  E coli only sensitive to ceftriaxone and tetracycline  =====================================================  WBC  "continues trending down   Infectious disease following   Continue current antibiotic regimen    Acute hypoxic respiratory failure  Patient with Hypoxic Respiratory failure which is Acute.  he is not on home oxygen. Supplemental oxygen was provided and noted-    Continue nightly BiPAP and supplemental O2, weaning as tolerated.  Pulmonology signed off 9/29    ABG  No results for input(s): "PH", "PO2", "PCO2", "HCO3", "BE" in the last 168 hours.  =================================  Had issues with some nasal bleeding/clotting  -Bipap humidification; nightly.    Perforation of intestine  S/p open laparotomy with resection of affected colon and creation of colostomy 9/25  ID following: Continue IV Zyvox, IV Zosyn, and Diflucan  Continue mobilization as tolerated:  Out of bed to chair t.i.d. with meals  PT/OT following  Continue regular diet  Pain management  -Repeat CT on 10/2 with some new fluid collections- IR aspirated with no signs of infection   -continue current Abx with end date 10/09    Essential hypertension  Chronic, controlled. Latest blood pressure and vitals reviewed-     Temp:  [98 °F (36.7 °C)-98.2 °F (36.8 °C)]   Pulse:  [74-93]   Resp:  [16-19]   BP: ()/(44-70)   SpO2:  [95 %-100 %] .   Home meds for hypertension were reviewed and noted below.   Hypertension Medications               cloNIDine (CATAPRES) 0.3 MG tablet TAKE 1 TABLET BY MOUTH THREE TIMES DAILY    hydrALAZINE (APRESOLINE) 100 MG tablet Take 1 tablet (100 mg total) by mouth 3 (three) times daily.    isosorbide mononitrate (IMDUR) 120 MG 24 hr tablet Take 120 mg by mouth once daily.    metoprolol succinate (TOPROL-XL) 50 MG 24 hr tablet Take 150 mg by mouth once daily.    olmesartan (BENICAR) 40 MG tablet Take 1 tablet by mouth once daily        While in the hospital, will manage blood pressure as follows; Continue current antihypertensive regimen: hydralazine, losartan, clonidine, and metoprolol.  Patient was apparently getting nitro " paste q.6 hours.  This was discontinued and his home isosorbide was resumed.    Will utilize p.r.n. blood pressure medication only if patient's blood pressure greater than 180/110 and he develops symptoms such as worsening chest pain or shortness of breath.    Sleep apnea  Currently getting BiPAP nightly while in the hospital per pulmonology    Pulmonary hypertension  Chronic.  No need to address acutely.    Severe obesity with body mass index (BMI) of 35.0 to 39.9 with comorbidity  Body mass index is 37.93 kg/m². Morbid obesity complicates all aspects of disease management from diagnostic modalities to treatment. Weight loss encouraged and health benefits explained to patient.     Anemia due to chronic kidney disease, on chronic dialysis  Anemia is likely due to acute blood loss which was from surgery and chronic disease due to ESRD. Most recent hemoglobin and hematocrit are listed below.  Recent Labs     10/03/24  0535 10/04/24  0728 10/05/24  0331   HGB 8.5* 7.7* 8.5*   HCT 27.8* 25.1* 28.2*     Plan  - Monitor serial CBC: Daily  - Transfuse PRBC if patient becomes hemodynamically unstable, symptomatic or H/H drops below 7/21.  - Patient has received 3 units of PRBCs  - Patient's anemia is currently stable    ESRD (end stage renal disease)  Creatine stable for now. BMP reviewed- noted Estimated Creatinine Clearance: 11.2 mL/min (A) (based on SCr of 12.4 mg/dL (H)). according to latest data. Based on current GFR, CKD stage is end stage.  Monitor UOP and serial BMP and adjust therapy as needed. Renally dose meds. Avoid nephrotoxic medications and procedures.  Nephrology following  Dialysis management per Nephrology  Renal diet      VTE Risk Mitigation (From admission, onward)           Ordered     heparin (porcine) injection 5,000 Units  Every 8 hours         10/04/24 0807     heparin (porcine) injection 4,000 Units  As needed (PRN)         09/20/24 1544     IP VTE HIGH RISK PATIENT  Once         09/17/24 1031      Place sequential compression device  Until discontinued         09/17/24 1031                    Discharge Planning   MARIA ISABEL: 10/6/2024     Code Status: Full Code   Is the patient medically ready for discharge?:     Reason for patient still in hospital (select all that apply): Pending disposition  Discharge Plan A: Rehab   Discharge Delays: None known at this time              Reva Meeks NP  Department of Hospital Medicine   [facility]

## 2024-10-05 NOTE — ASSESSMENT & PLAN NOTE
-humidify the bipap  -Afrin BID  -Not acutely bleeding on my exam today  -Heparin SQ was held:  Resolved. Resumed 10/4   -No bleeding since

## 2024-10-05 NOTE — PROGRESS NOTES
Patient seen and examined.  He was resting comfortably in bed.  No fevers in the past 24 hours.  Leukocytosis has normalized.  Having ostomy function.  He was tolerating diet.  From a surgical perspective patient was doing well.  Okay to discharge to rehab facility when cleared by Medicine.  Can follow up with me in the office for 1-2 weeks.

## 2024-10-05 NOTE — ASSESSMENT & PLAN NOTE
"Patient with Hypoxic Respiratory failure which is Acute.  he is not on home oxygen. Supplemental oxygen was provided and noted-    Continue nightly BiPAP and supplemental O2, weaning as tolerated.  Pulmonology signed off 9/29    ABG  No results for input(s): "PH", "PO2", "PCO2", "HCO3", "BE" in the last 168 hours.  =================================  Had issues with some nasal bleeding/clotting  -Bipap humidification; nightly.  "

## 2024-10-06 VITALS
HEART RATE: 86 BPM | HEIGHT: 74 IN | WEIGHT: 295.44 LBS | BODY MASS INDEX: 37.92 KG/M2 | OXYGEN SATURATION: 96 % | TEMPERATURE: 98 F | SYSTOLIC BLOOD PRESSURE: 102 MMHG | DIASTOLIC BLOOD PRESSURE: 66 MMHG | RESPIRATION RATE: 18 BRPM

## 2024-10-06 PROBLEM — K40.40: Status: RESOLVED | Noted: 2024-09-17 | Resolved: 2024-10-06

## 2024-10-06 PROBLEM — A41.9 SEPSIS: Status: RESOLVED | Noted: 2024-09-25 | Resolved: 2024-10-06

## 2024-10-06 PROBLEM — J96.01 ACUTE HYPOXIC RESPIRATORY FAILURE: Status: RESOLVED | Noted: 2024-09-25 | Resolved: 2024-10-06

## 2024-10-06 PROBLEM — N45.2 ORCHITIS: Status: RESOLVED | Noted: 2024-09-29 | Resolved: 2024-10-06

## 2024-10-06 PROBLEM — K63.1 PERFORATION OF INTESTINE: Status: RESOLVED | Noted: 2024-09-25 | Resolved: 2024-10-06

## 2024-10-06 LAB
ALBUMIN SERPL BCP-MCNC: 3.5 G/DL (ref 3.5–5.2)
ALP SERPL-CCNC: 92 U/L (ref 55–135)
ALT SERPL W/O P-5'-P-CCNC: 23 U/L (ref 10–44)
ANION GAP SERPL CALC-SCNC: 20 MMOL/L (ref 8–16)
AST SERPL-CCNC: 22 U/L (ref 10–40)
BACTERIA FLD AEROBE CULT: NO GROWTH
BASOPHILS # BLD AUTO: 0.04 K/UL (ref 0–0.2)
BASOPHILS NFR BLD: 0.4 % (ref 0–1.9)
BILIRUB SERPL-MCNC: 0.4 MG/DL (ref 0.1–1)
BUN SERPL-MCNC: 51 MG/DL (ref 6–20)
CALCIUM SERPL-MCNC: 9.7 MG/DL (ref 8.7–10.5)
CHLORIDE SERPL-SCNC: 92 MMOL/L (ref 95–110)
CO2 SERPL-SCNC: 19 MMOL/L (ref 23–29)
CREAT SERPL-MCNC: 15.3 MG/DL (ref 0.5–1.4)
DIFFERENTIAL METHOD BLD: ABNORMAL
EOSINOPHIL # BLD AUTO: 0.3 K/UL (ref 0–0.5)
EOSINOPHIL NFR BLD: 2.8 % (ref 0–8)
ERYTHROCYTE [DISTWIDTH] IN BLOOD BY AUTOMATED COUNT: 21.2 % (ref 11.5–14.5)
EST. GFR  (NO RACE VARIABLE): 3.6 ML/MIN/1.73 M^2
GLUCOSE SERPL-MCNC: 89 MG/DL (ref 70–110)
GRAM STN SPEC: NORMAL
GRAM STN SPEC: NORMAL
HCT VFR BLD AUTO: 26.8 % (ref 40–54)
HGB BLD-MCNC: 8.5 G/DL (ref 14–18)
IMM GRANULOCYTES # BLD AUTO: 0.08 K/UL (ref 0–0.04)
IMM GRANULOCYTES NFR BLD AUTO: 0.9 % (ref 0–0.5)
LYMPHOCYTES # BLD AUTO: 1.6 K/UL (ref 1–4.8)
LYMPHOCYTES NFR BLD: 17.5 % (ref 18–48)
MAGNESIUM SERPL-MCNC: 2.5 MG/DL (ref 1.6–2.6)
MCH RBC QN AUTO: 31 PG (ref 27–31)
MCHC RBC AUTO-ENTMCNC: 31.7 G/DL (ref 32–36)
MCV RBC AUTO: 98 FL (ref 82–98)
MONOCYTES # BLD AUTO: 1.3 K/UL (ref 0.3–1)
MONOCYTES NFR BLD: 14.2 % (ref 4–15)
NEUTROPHILS # BLD AUTO: 6 K/UL (ref 1.8–7.7)
NEUTROPHILS NFR BLD: 64.2 % (ref 38–73)
NRBC BLD-RTO: 0 /100 WBC
PLATELET # BLD AUTO: 410 K/UL (ref 150–450)
PMV BLD AUTO: 9.6 FL (ref 9.2–12.9)
POCT GLUCOSE: 102 MG/DL (ref 70–110)
POCT GLUCOSE: 111 MG/DL (ref 70–110)
POTASSIUM SERPL-SCNC: 5 MMOL/L (ref 3.5–5.1)
PROT SERPL-MCNC: 8.7 G/DL (ref 6–8.4)
RBC # BLD AUTO: 2.74 M/UL (ref 4.6–6.2)
SODIUM SERPL-SCNC: 131 MMOL/L (ref 136–145)
WBC # BLD AUTO: 9.38 K/UL (ref 3.9–12.7)

## 2024-10-06 PROCEDURE — 25000003 PHARM REV CODE 250

## 2024-10-06 PROCEDURE — 25000003 PHARM REV CODE 250: Performed by: NURSE PRACTITIONER

## 2024-10-06 PROCEDURE — 25000003 PHARM REV CODE 250: Performed by: SURGERY

## 2024-10-06 PROCEDURE — 63700000 PHARM REV CODE 250 ALT 637 W/O HCPCS: Performed by: INTERNAL MEDICINE

## 2024-10-06 PROCEDURE — 94660 CPAP INITIATION&MGMT: CPT

## 2024-10-06 PROCEDURE — 27000221 HC OXYGEN, UP TO 24 HOURS

## 2024-10-06 PROCEDURE — 99900031 HC PATIENT EDUCATION (STAT)

## 2024-10-06 PROCEDURE — 36415 COLL VENOUS BLD VENIPUNCTURE: CPT | Performed by: SURGERY

## 2024-10-06 PROCEDURE — 63600175 PHARM REV CODE 636 W HCPCS: Performed by: SURGERY

## 2024-10-06 PROCEDURE — 80053 COMPREHEN METABOLIC PANEL: CPT | Performed by: SURGERY

## 2024-10-06 PROCEDURE — 83735 ASSAY OF MAGNESIUM: CPT | Performed by: SURGERY

## 2024-10-06 PROCEDURE — 63600175 PHARM REV CODE 636 W HCPCS: Performed by: NURSE PRACTITIONER

## 2024-10-06 PROCEDURE — 97116 GAIT TRAINING THERAPY: CPT | Mod: CQ

## 2024-10-06 PROCEDURE — 94761 N-INVAS EAR/PLS OXIMETRY MLT: CPT

## 2024-10-06 PROCEDURE — 85025 COMPLETE CBC W/AUTO DIFF WBC: CPT | Performed by: SURGERY

## 2024-10-06 PROCEDURE — 99900035 HC TECH TIME PER 15 MIN (STAT)

## 2024-10-06 PROCEDURE — 25000003 PHARM REV CODE 250: Performed by: INTERNAL MEDICINE

## 2024-10-06 RX ORDER — OXYMETAZOLINE HCL 0.05 %
1 SPRAY, NON-AEROSOL (ML) NASAL 2 TIMES DAILY
Status: ON HOLD
Start: 2024-10-06

## 2024-10-06 RX ORDER — OXYMETAZOLINE HCL 0.05 %
1 SPRAY, NON-AEROSOL (ML) NASAL 2 TIMES DAILY
Status: ON HOLD | COMMUNITY
End: 2024-10-06

## 2024-10-06 RX ADMIN — HYDRALAZINE HYDROCHLORIDE 100 MG: 25 TABLET ORAL at 02:10

## 2024-10-06 RX ADMIN — ISOSORBIDE MONONITRATE 30 MG: 30 TABLET, EXTENDED RELEASE ORAL at 09:10

## 2024-10-06 RX ADMIN — GABAPENTIN 100 MG: 100 CAPSULE ORAL at 09:10

## 2024-10-06 RX ADMIN — FLUCONAZOLE 100 MG: 100 TABLET ORAL at 09:10

## 2024-10-06 RX ADMIN — HYDROMORPHONE HYDROCHLORIDE 4 MG: 2 TABLET ORAL at 03:10

## 2024-10-06 RX ADMIN — OXYCODONE HYDROCHLORIDE 10 MG: 10 TABLET, FILM COATED, EXTENDED RELEASE ORAL at 09:10

## 2024-10-06 RX ADMIN — LINEZOLID 600 MG: 600 TABLET, FILM COATED ORAL at 09:10

## 2024-10-06 RX ADMIN — HYDROMORPHONE HYDROCHLORIDE 4 MG: 2 TABLET ORAL at 12:10

## 2024-10-06 RX ADMIN — PIPERACILLIN SODIUM AND TAZOBACTAM SODIUM 4.5 G: 4; .5 INJECTION, POWDER, LYOPHILIZED, FOR SOLUTION INTRAVENOUS at 03:10

## 2024-10-06 RX ADMIN — METOPROLOL SUCCINATE 150 MG: 50 TABLET, FILM COATED, EXTENDED RELEASE ORAL at 09:10

## 2024-10-06 RX ADMIN — HEPARIN SODIUM 5000 UNITS: 5000 INJECTION, SOLUTION INTRAVENOUS; SUBCUTANEOUS at 06:10

## 2024-10-06 RX ADMIN — DIPHENHYDRAMINE HYDROCHLORIDE 25 MG: 25 CAPSULE ORAL at 11:10

## 2024-10-06 RX ADMIN — GABAPENTIN 100 MG: 100 CAPSULE ORAL at 02:10

## 2024-10-06 RX ADMIN — PIPERACILLIN SODIUM AND TAZOBACTAM SODIUM 4.5 G: 4; .5 INJECTION, POWDER, LYOPHILIZED, FOR SOLUTION INTRAVENOUS at 02:10

## 2024-10-06 RX ADMIN — SEVELAMER CARBONATE 2400 MG: 800 TABLET, FILM COATED ORAL at 09:10

## 2024-10-06 RX ADMIN — LOSARTAN POTASSIUM 100 MG: 50 TABLET, FILM COATED ORAL at 09:10

## 2024-10-06 RX ADMIN — SEVELAMER CARBONATE 2400 MG: 800 TABLET, FILM COATED ORAL at 11:10

## 2024-10-06 NOTE — ASSESSMENT & PLAN NOTE
Patient with Acute debility due to bed confinement status and prolonged hospitalization, and infection . Latest AMPAC and GEMS scores have not been reviewed. Plan includes progressive mobility protocol initated, PT/OT consulted, fall precautions in place, and CM following for discharge planning .  [facility]

## 2024-10-06 NOTE — PLAN OF CARE
[facility] ([facility]) reports they are ready to receive patient - report needs to be called to [phone] - transport set up by [facility] - ETA within the hour - patient aware - RN updated     **Cleared for discharge from CM standpoint**      [facility]  Discharge Final Note    Primary Care Provider: Dawson Granado MD    Expected Discharge Date: 10/6/2024    Final Discharge Note (most recent)       Final Note - 10/06/24 1353          Final Note    Assessment Type Final Discharge Note     Anticipated Discharge Disposition Rehab Facility     What phone number can be called within the next 1-3 days to see how you are doing after discharge? [phone]     Hospital Resources/Appts/Education Provided Provided patient/caregiver with written discharge plan information        Post-Acute Status    Post-Acute Authorization Placement     Post-Acute Placement Status Set-up Complete/Auth obtained     Discharge Delays None known at this time                   Contact Galileo Glez MD   Specialty: General Surgery, Colon and Rectal Surgery, Surgery    [address]  [facility] [address]   Phone: [phone]       Next Steps: Follow up    Instructions: 10/9 @ 12:45    Dawson Granado MD   Specialty: Family Medicine   Relationship: PCP - General    [address]  [facility] [address]   Phone: [phone]       Next Steps: Follow up             Adbry Counseling: I discussed with the patient the risks of tralokinumab including but not limited to eye infection and irritation, cold sores, injection site reactions, worsening of asthma, allergic reactions and increased risk of parasitic infection.  Live vaccines should be avoided while taking tralokinumab. The patient understands that monitoring is required and they must alert us or the primary physician if symptoms of infection or other concerning signs are noted.

## 2024-10-06 NOTE — ASSESSMENT & PLAN NOTE
-humidify the bipap  -Not acutely bleeding on my exam today  -Heparin SQ was held:  Resolved. Resumed 10/4   -Some blood noted on tissues on bedside table but no active bleeding during assessment  -Educated patient to avoid picking/forcefully blowing nose  -Afrin PRN 1-2 times/day not to exceed 3 days

## 2024-10-06 NOTE — DISCHARGE SUMMARY
[facility] Medicine  Discharge Summary      Patient Name: João Ham  MRN: [id number]  [facility]: [id number]  Patient Class: IP- Inpatient  Admission Date: 9/17/2024  Hospital Length of Stay: 19 days  Discharge Date and Time:  10/06/2024 3:35 PM  Attending Physician: Ag Reyes, *   Discharging Provider: Reva eMeks NP  Primary Care Provider: Dawson Granado MD    Primary Care Team: Networked reference to record PCT     HPI:   Patient is a 44 year old male with history of ESDR on dialysis MWF, awaiting kidney transplant, HTN, presented to ED with 3 days history of left groin pain and swelling. States swelling comes every time he cough and is being painful. Patient has low grade fever 99s at home. He has been vomiting bilious fluid last 2-3 days. No diarrhea or abdominal pain.     In the ED CT abdomen showed Left inguinal hernia containing fat and air as well as marked inflammation extending from inside the pelvis, in the hernia and down into the left scrotum. This is consistent with an infected inguinal hernia, possible gangrene. WBC was 14, patient was tachycardic. General surgery was consulted and patient was admitted under hospitalist.     Procedure(s) (LRB):  LAPAROTOMY, EXPLORATORY (N/A)  COLON RESECTION  MOBILIZATION, SPLENIC FLEXURE      Hospital Course:   Patient is a 44 year old male with history of ESRD, was admitted with suspected inguinal hernia gangrene. General surgery was consulted and patient was taken to OR. Found to have colonic perforation due to mesh erosion with an abscess. Patient underwent sigmoid resection and drainage of the abscess, was started on clindamycin, Vancomycin and cefepime. Nephrology was consulted for chronic dialysis. AVF was not functioning, General surgery consulted, trialysis catheter placed. PRBC transfused during dialysis for low Hb 6.6.  While on broad-spectrum coverage, patient is spiking fevers and worsening leukocytosis, id  consulted, discontinued clindamycin, vancomycin and cefepime-added daptomycin, Diflucan and Zosyn.  Repeated CT abdomen and pelvis that showed residual abscess/phlegmon and contained perforation. Re-consulted surgery who mentioned likely post op changes. Fistulogram 9/24 by vascular surgery for declotting the AV fistula. Trialysis removed 9/25. Had worsening white count, fevers upto 103.5 and hypotension overnight 9/25.  Also had hemoglobin of 7, with worsening hypoxia up to 7 L (overnight desaturated to 70% with lethargy requiring BiPAP placement), after which nephrology recommended 1 unit PRBC transfusion.  Id, Nephrology, Urology, General surgery were updated about his worsening condition.We CT repeated that showed intraperitoneal free air and findings suggestive of necrotizing fasciitis, general surgery was consulted stat, patient was transferred to ICU.  The surgeon performed open laparotomy and noted fecal contamination of the left lower quadrant indicative of anastomotic disruption.  Colon was resected, and colostomy was created. Patient was placed on full liquid diet, was unable to tolerate and downgraded back to clear liquid diet.  Also complaining of excess mucus and cough.  Patient encouraged to use incentive spirometer as directed, and press pillow to abdomen when coughing.  Pulmonology signed off on 09/29 with recommendation to continue BiPAP nightly and with naps.  PT/OT recommended high-intensity therapy due to patient's deconditioning and discharge planning was started.  Pain regimen was adjusted.  Tachycardia and stagnant white blood cell count noted with low-grade temps-a CT abdomen and pelvis was pursued on 10/02.  This was revealing for 2 new fluid collections.  These were discussed with general surgery and IR.  IR did do an aspirate of the collection they felt easily accessible, this was negative on Gram stain with cultures pending.  Did not feel either fluid collection with rim enhancing or  indicative of an abscess.  General surgery agreed.  Leukocytosis improved.  Temperature is improved.  He had issues with ongoing pain control and long-acting pain regimen was added. Patient cleared for discharge from surgical standpoint with f/u in 1-2 weeks. BERNABE drain removed and surgical site CDI. Patient assessed on day of discharge and stable for discharge. Some overnight epistaxis reported but patient also reported that he was picking and blowing his nose a lot. Educated on nose bleeds and education included in discharge instructions. Patient also instructed to only use Afrin 1-2 times in a 24 hour period and not to exceed 3 days. This was also included in discharge instructions. Patient will continue abx thru 10/9 and then have midline removed. He is to f/u with ID in 3 weeks. He is also to f/u with urology in 2 weeks. Plan discussed with patient and he is agreeable and ready for discharge.      Goals of Care Treatment Preferences:  Code Status: Full Code      SDOH Screening:  The patient was screened for utility difficulties, food insecurity, transport difficulties, housing insecurity, and interpersonal safety and there were no concerns identified this admission.     Consults:   Consults (From admission, onward)          Status Ordering Provider     Inpatient consult to   Once        Provider:  (Not yet assigned)    Completed ARIAN PULLIAM     Inpatient consult to Registered Dietitian/Nutritionist  Once        Provider:  (Not yet assigned)    Completed ARIAN PULLIAM     Inpatient consult to Intensivist  Once        Provider:  Kisha Rodriguez MD    Completed HILL MEJÍA     Inpatient consult to Midline team  Once        Provider:  (Not yet assigned)    Completed LEATHA CARRANZA     Inpatient consult to Urology  Once        Provider:  Angie Lopez MD    Completed YULISSA RIZO     Inpatient consult to Vascular Surgery  Once        Provider:  [place]  "MD Lorraine    Completed YULISSA RIZO     Inpatient consult to Infectious Diseases  Once        Provider:  Luh Soares MD    Completed YULISSA RIZO     Inpatient consult to Vascular Surgery  Once        Provider:  Khoobehi, Ali, MD    Completed YADIRA ROCK     Inpatient consult to Nephrology  Once        Provider:  Mariano Hickey MD    Completed NATHALY KEYES     Inpatient consult to General surgery  Once        Provider:  (Not yet assigned)    Completed ROSE MONROY            Pulmonary  Acute hypoxic respiratory failure  Patient with Hypoxic Respiratory failure which is Acute.  he is not on home oxygen. Supplemental oxygen was provided and noted- Oxygen Concentration (%):  [28] 28  Continue nightly BiPAP and supplemental O2, weaning as tolerated.  Pulmonology signed off 9/29    ABG  No results for input(s): "PH", "PO2", "PCO2", "HCO3", "BE" in the last 168 hours.  =================================  Had issues with some nasal bleeding/clotting  -Bipap humidification; nightly.      Cardiac/Vascular  Essential hypertension  Chronic, controlled. Latest blood pressure and vitals reviewed-     Temp:  [97.5 °F (36.4 °C)-98.3 °F (36.8 °C)]   Pulse:  [74-86]   Resp:  [17-18]   BP: (102-124)/(63-73)   SpO2:  [92 %-100 %] .   Home meds for hypertension were reviewed and noted below.   Hypertension Medications               cloNIDine (CATAPRES) 0.3 MG tablet TAKE 1 TABLET BY MOUTH THREE TIMES DAILY    hydrALAZINE (APRESOLINE) 100 MG tablet Take 1 tablet (100 mg total) by mouth 3 (three) times daily.    isosorbide mononitrate (IMDUR) 120 MG 24 hr tablet Take 120 mg by mouth once daily.    metoprolol succinate (TOPROL-XL) 50 MG 24 hr tablet Take 150 mg by mouth once daily.    olmesartan (BENICAR) 40 MG tablet Take 1 tablet by mouth once daily        While in the hospital, will manage blood pressure as follows; Continue current antihypertensive regimen: hydralazine, losartan, clonidine, " and metoprolol.  Patient was apparently getting nitro paste q.6 hours.  This was discontinued and his home isosorbide was resumed.    Will utilize p.r.n. blood pressure medication only if patient's blood pressure greater than 180/110 and he develops symptoms such as worsening chest pain or shortness of breath.    Pulmonary hypertension  Chronic.  No need to address acutely.    Renal/  ESRD (end stage renal disease)  Creatine stable for now. BMP reviewed- noted Estimated Creatinine Clearance: 9.1 mL/min (A) (based on SCr of 15.3 mg/dL (H)). according to latest data. Based on current GFR, CKD stage is end stage.  Monitor UOP and serial BMP and adjust therapy as needed. Renally dose meds. Avoid nephrotoxic medications and procedures.  Renal diet  Cleared for discharge by nephrology, resume outpatient schedule    ID  Sepsis  This patient does have evidence of infective focus  My overall impression is sepsis.  Source: Abdominal  Antibiotics given-   Antibiotics (72h ago, onward)      Start     Stop Route Frequency Ordered    10/02/24 1200  linezolid tablet 600 mg         -- Oral Every 12 hours 10/02/24 1052    09/24/24 1800  piperacillin-tazobactam 4.5 g in dextrose 5 % 100 mL IVPB (ready to mix)         -- IV Every 12 hours (non-standard times) 09/24/24 1035        fluconazole (DIFLUCAN) IVPB 200 mg  Start Date/Time (Original Order): 09/22/24 1500   Ordered Dose: 200 mg Route: Intravenous Frequency: Every 24 hours (non-standard times)       Source control achieved by: IV antibiotics and surgical intervention  =======================================================  Blood culture 09/07/2024: NGTD   Urine culture 09/19/2024: NGTD   Blood culture 09/24/2024:  NGTD   Left groin drainage culture 09/24/2024:  E coli only resistant to tetracycline, cefazolin, ampicillin and ceftriaxone  Abdomen incision site culture 09/24/2024:  E coli only sensitive to ceftriaxone and  tetracycline  =====================================================  Continuing to improve  WBC continues trending down   Infectious disease has signed off   Continue current antibiotic regimen thru 10/9     Oncology  Anemia due to chronic kidney disease, on chronic dialysis  Anemia is likely due to acute blood loss which was from surgery and chronic disease due to ESRD. Most recent hemoglobin and hematocrit are listed below.  Recent Labs     10/04/24  0728 10/05/24  0331 10/06/24  0625   HGB 7.7* 8.5* 8.5*   HCT 25.1* 28.2* 26.8*     Plan  - Monitor serial CBC: Daily  - Transfuse PRBC if patient becomes hemodynamically unstable, symptomatic or H/H drops below 7/21.  - Patient has received 3 units of PRBCs  - Patient's anemia is currently stable    Endocrine  Severe obesity with body mass index (BMI) of 35.0 to 39.9 with comorbidity  Body mass index is 37.93 kg/m². Morbid obesity complicates all aspects of disease management from diagnostic modalities to treatment. Weight loss encouraged and health benefits explained to patient.     GI  * Inguinal hernia of left side with gangrene-resolved as of 10/6/2024  W/ Colonic perforation with abscess  CT abdomen shows Left inguinal hernia containing fat and air as well as marked inflammation extending from inside the pelvis, in the hernia and down into the left scrotum.   S/P Robotic sigmoid resection, Mobilization of splenic flexure, left inguinal canal exploration, and I&D 9/17 per Dr. Connors with repeat OR visit for anastomotic leak on 9/25  Continue regular diet:  Started 9/30  Still working on pain control, continue OxyContin twice daily in addition to the p.o. Dilaudid p.r.n.    Resolved, general surgery has cleared for discharge  Much better pain control with current regimen  Patient will f/u with general surgery in 1-2 weeks    Perforation of intestine-resolved as of 10/6/2024  S/p open laparotomy with resection of affected colon and creation of colostomy 9/25  ID  following: Continue IV Zyvox, IV Zosyn, and Diflucan  Continue mobilization as tolerated:  Out of bed to chair t.i.d. with meals  PT/OT following  Continue regular diet  Pain management  -Repeat CT on 10/2 with some new fluid collections- IR aspirated with no signs of infection   -continue current Abx with end date 10/09    Resolved, cleared for discharge from general surgery  Continue abx thru 10/9 and f/u with ID in 3 weeks    Other  Bleeding from the nose  -humidify the bipap  -Not acutely bleeding on my exam today  -Heparin SQ was held:  Resolved. Resumed 10/4   -Some blood noted on tissues on bedside table but no active bleeding during assessment  -Educated patient to avoid picking/forcefully blowing nose  -Afrin PRN 1-2 times/day not to exceed 3 days      Insomnia  Was started on  Xanax 0.25 mg q.h.s. PRN insomnia      Debility  Patient with Acute debility due to bed confinement status and prolonged hospitalization, and infection . Latest AMPAC and GEMS scores have not been reviewed. Plan includes progressive mobility protocol initated, PT/OT consulted, fall precautions in place, and CM following for discharge planning .  Discharging to [facility]    Sleep apnea  Currently getting BiPAP nightly while in the hospital per pulmonology  Per patient's outpatient pulmonologist, he is also supposed to wear BiPAP nightly at home - will order for patient to continue at [facility]      Final Active Diagnoses:    Diagnosis Date Noted POA    Bleeding from the nose [R04.0] 10/02/2024 No    Insomnia [G47.00] 10/01/2024 No    Debility [R53.81] 09/30/2024 No     Chronic    Acute hypoxic respiratory failure [J96.01] 09/25/2024 Yes    Sepsis [A41.9] 09/25/2024 Yes    Essential hypertension [I10] 07/17/2024 Yes    Sleep apnea [G47.30] 03/31/2023 Yes    Pulmonary hypertension [I27.20] 11/29/2022 Yes    Severe obesity with body mass index (BMI) of 35.0 to 39.9 with comorbidity [E66.01] 10/17/2022 Yes    ESRD (end stage renal  "disease) [N18.6] 10/10/2022 Yes    Anemia due to chronic kidney disease, on chronic dialysis [N18.6, D63.1, Z99.2] 10/10/2022 Not Applicable      Problems Resolved During this Admission:    Diagnosis Date Noted Date Resolved POA    PRINCIPAL PROBLEM:  Inguinal hernia of left side with gangrene [K40.40] 09/17/2024 10/06/2024 Yes    Orchitis [N45.2] 09/29/2024 10/06/2024 No    Hyperkalemia [E87.5] 09/26/2024 10/02/2024 Yes    Perforation of intestine [K63.1] 09/25/2024 10/06/2024 No    Dialysis AV fistula malfunction [T82.590A] 09/19/2024 09/30/2024 No       Discharged Condition: stable    Disposition: Rehab Facility    Follow Up:   Follow-up Information       Galileo Connors MD Follow up.    Specialties: General Surgery, Colon and Rectal Surgery, Surgery  Why: 10/9 @ 12:45  Contact information:  [address]  [place] [address]  [phone]               Dawson Granado MD Follow up.    Specialty: Family Medicine  Contact information:  [address]  [place] [address]  [phone]               Angie Lopez MD Follow up in 2 week(s).    Specialty: Urology  Contact information:  [address] [place] [address] [address]  [address]  [place] [address]  [phone]                           Patient Instructions:      OSTOMY SUPPLIES FOR HOME USE     Order Specific Question Answer Comments   Height: 6' 2" (1.88 m)    Weight: 134 kg (295 lb 6.7 oz)    Length of need (1-99 months): 3    Diagnosis Colostomy    1 piece system:Qty - Month 20     Reference Number Coloplast Sensuri Jalen # [id number]    Skin prep? Yes Brava #[id number]   Odor eliminator? Yes Brava #[id number]   Adhesive remover? Yes Brava Spray #[id number]   Rings? Yes Brava # [id number]   Barrier strips? Yes Brava #[id number]   Does patient have medical equipment at home? CPAP      Ambulatory referral/consult to Wound Clinic   Standing Status: Future   Referral Priority: Routine Referral Type: Consultation   Referral Reason: Specialty Services Required   Referred " to Provider: NGOZI LIGHT Requested Specialty: Wound Care   Number of Visits Requested: 1     Ambulatory referral/consult to [facility]   Standing Status: Future   Referral Priority: Routine Referral Type: Consultation   Referral Reason: Specialty Services Required   Requested Specialty: Wound Care   Number of Visits Requested: 1     Ambulatory referral/consult to Infectious Disease   Standing Status: Future   Referral Priority: Routine Referral Type: Consultation   Referral Reason: Specialty Services Required   Requested Specialty: Infectious Diseases   Number of Visits Requested: 1     Ambulatory referral/consult to Urology   Standing Status: Future   Referral Priority: Routine Referral Type: Consultation   Referral Reason: Specialty Services Required   Requested Specialty: Urology   Number of Visits Requested: 1       Significant Diagnostic Studies: Labs: CMP   Recent Labs   Lab 10/05/24  0331 10/06/24  0625   * 131*   K 4.7 5.0   CL 94* 92*   CO2 18* 19*    89   BUN 41* 51*   CREATININE 12.4* 15.3*   CALCIUM 9.1 9.7   PROT 8.9* 8.7*   ALBUMIN 3.7 3.5   BILITOT 0.4 0.4   ALKPHOS 101 92   AST 21 22   ALT 23 23   ANIONGAP 17* 20*   , CBC   Recent Labs   Lab 10/05/24  0331 10/06/24  0625   WBC 9.81 9.38   HGB 8.5* 8.5*   HCT 28.2* 26.8*    410   , and All labs within the past 24 hours have been reviewed  Microbiology: Blood Culture   Lab Results   Component Value Date    LABBLOO No growth after 5 days. 09/25/2024    LABBLOO No growth after 5 days. 09/25/2024    and Wound Culture: positive for       Abnormal   ESCHERICHIA COLI  From broth only        Abnormal   PARABACTEROIDES DISTASONIS  Few  Beta Lactamase positive     Radiology: X-Ray: CXR: X-Ray Chest 1 View (CXR):   Results for orders placed or performed during the hospital encounter of 09/17/24   X-Ray Chest 1 View    Narrative    EXAMINATION:  XR CHEST 1 VIEW    CLINICAL HISTORY:  confirm hemodialysis cath  placement;    FINDINGS:  Portable chest with comparison chest x-ray 09/19/2024.  Left internal jugular dialysis catheter again noted.  Tip is in the anatomic region of the SVC and is unchanged from previous exam..  Normal cardiomediastinal silhouette.Lungs clear.  No pneumothorax.  Pulmonary vasculature is normal. No acute osseous abnormality.      Impression    Left internal jugular dialysis catheter tip is in the anatomic region of the SVC.      Electronically signed by: Thompson Lee  Date:    09/23/2024  Time:    16:08    and KUB: X-Ray Abdomen AP 1 View (KUB): No results found for this visit on 09/17/24.  CT scan: CT ABDOMEN PELVIS WITH CONTRAST:   Results for orders placed or performed during the hospital encounter of 09/17/24   CT Abdomen Pelvis With IV Contrast Routine Oral Contrast    Narrative    EXAMINATION:  CT ABDOMEN PELVIS WITH IV CONTRAST    CLINICAL HISTORY:  Abdominal abscess/infection suspected;Abdominal pain, post-op;    TECHNIQUE:  Low dose axial images, sagittal and coronal reformations were obtained from the lung bases to the pubic symphysis following the IV administration of 100 mL of Visipaque 320 and the oral administration of 1000 mL of Omnipaque 9.    COMPARISON:  CT 09/25/2024, 09/23/2024    FINDINGS:  There are scattered bandlike opacities at the lung bases suggesting atelectasis or scarring.  No significant pleural fluid.  The visualized portions of the heart appear normal.    There is decreased attenuation of the hepatic parenchyma which may reflect hepatic steatosis.  The gallbladder shows no evidence of stones or pericholecystic fluid.  There is no intra-or extrahepatic biliary ductal dilatation.    The stomach is mildly distended presumably with recently ingested oral contents.  The spleen is somewhat atrophic.  The pancreas and adrenal glands demonstrate no acute abnormalities.    The native kidneys are atrophic.  There is a stable left renal cyst.  There is no hydronephrosis.   Urinary bladder is decompressed.  There is presumed contrast material within the bladder lumen with mild circumferential wall thickening.    The abdominal aorta is normal in course and caliber mild atherosclerotic calcification along its course.  No retroperitoneal hematoma.    There has been interval postoperative change of partial colon resection with a colostomy now present in the left paramidline abdomen.  There is no evidence to suggest small bowel obstruction.  There is a percutaneous drainage catheter in place terminating within the left lower abdomen.  Previously demonstrated extraluminal gas in this region appears intervally resolved.  There is continued nonspecific moderate mesenteric fat stranding and ill-defined fluid which may reflect underlying peritoneal infection/inflammation.  There is an ill-defined, low-attenuation relatively homogeneous fluid collection underlying the anterior abdominal wall fascia measuring approximately 2.3 x 8.8 x 9.7 cm, new from prior examination (for example axial series 3, image 149).  Previously demonstrated free intraperitoneal air is significantly improved prior examination.  No portal venous gas.    Previously demonstrated percutaneous catheter transversing the left inguinal canal has been intervally removed.  There is an apparent open soft tissue wound overlying the left inguinal canal.  There is continued ill-defined heterogeneous fluid/material within the distended left inguinal canal as well as a few scattered foci of gas, similar to prior examination.  There is continued asymmetric edema of the left hemiscrotum.  There is an apparent left sided hydrocele.    There is continued nonspecific subcutaneous emphysema tracking along the left anterior abdominal wall musculature fascia.  This overall appears reduced in volume compared to prior examination.  There is a mildly complex 4.6 x 2.6 x 10.0 cm fluid collection associated with the midline incision (axial series 3,  is 184).  There is a previously demonstrated nonspecific ill-defined fluid collection within the right anterior abdominal subcutaneous soft tissues measuring 3.1 x 5.0 cm (axial series 3, image 127), similar to prior examination.  There are scattered nodular soft tissue densities throughout the anterior abdominal wall presumably reflecting prior injection sites.    Osseous structures demonstrate no acute abnormality.      Impression    Postoperative change of interval partial colon resection with a colostomy now present in the left paramidline abdomen.  There is continued nonspecific moderate mesenteric fat stranding and ill-defined fluid within the left lower pelvis which may reflect ongoing peritoneal inflammation/infection.    New, ill-defined relatively homogeneous low-attenuation fluid collection underlying the anterior abdominal wall fascia with dimensions above.  This is nonspecific and may reflect a postoperative seroma, hematoma, or abscess.  Clinical correlation advised.    Interval improvement of previously demonstrated intraperitoneal free air.  Continued small to moderate volume of subcutaneous emphysema tracking along the left anterior abdominal fascia, improved from prior study.    Continued abnormal appearance of the left inguinal canal containing heterogeneous material/fluid and small volume of gas possibly reflecting complex blood products, phlegmonous material, and/or proteinaceous material.  Previously demonstrated percutaneous catheter has been intervally removed with apparent open wound overlying the inguinal canal.  Clinical correlation advised.    Mildly complex subcutaneous fluid collection associated with midline surgical incision with dimensions above.    Additional findings as above      Electronically signed by: Jose Meier MD  Date:    10/03/2024  Time:    01:54    and CT ABDOMEN PELVIS WITHOUT CONTRAST:   Results for orders placed or performed during the hospital encounter of  09/17/24   CT Abdomen Pelvis  Without Contrast    Narrative    EXAMINATION:  CT ABDOMEN PELVIS WITHOUT CONTRAST    CLINICAL HISTORY:  left inguinal pain, c/f hernia;    TECHNIQUE:  Low dose axial images, sagittal and coronal reformations were obtained from the lung bases to the pubic symphysis.    COMPARISON:  None    FINDINGS:  The liver is of normal size contour and CT density without focal defect.  The gallbladder is of normal size without CT evidence of stone.  The pancreas is of normal contour and CT density without edema or mass.  The spleen is small but of normal CT density.  A small accessory spleen is noted.    The adrenal glands are not enlarged.  The kidneys are small.  There is a low-density 2.4 cm probable cyst of the lateral surface of the midpole of the left kidney.  Stone or hydronephrosis is not seen on either side.  The abdominal aorta and inferior vena cava are of normal caliber.    The stomach is of normal configuration.  Small bowel dilatation or air-fluid levels are not seen.  There is diverticulosis of the descending and sigmoid colon without CT evidence of diverticulitis.  A normal appendix is noted.  Free fluid or free air in the peritoneum is not seen.    In the right groin there is significant inflammation of a inguinal hernia containing fat and air.  There is also edema of the left scrotum and adenopathy in the left groin the largest node having a short axis 1.3 cm.  Similar finding is not seen on the right.      Impression    Left inguinal hernia containing fat and air as well as marked inflammation extending from inside the pelvis, in the hernia and down into the left scrotum.  This is consistent with an infected inguinal hernia, possible gangrene.    Diverticulosis coli without CT evidence of diverticulitis.  2.4 cm cyst of the left kidney.  Small kidneys noted    These results were telephoned to Dr. Hurst, Emergency Department on 17 September 2024 at 09:30      Electronically signed  by: Everett Castellano MD  Date:    09/17/2024  Time:    09:31       Pending Diagnostic Studies:       None           Medications:  Reconciled Home Medications:      Medication List        START taking these medications      acetaminophen 325 MG tablet  Commonly known as: TYLENOL  Take 2 tablets (650 mg total) by mouth every 4 (four) hours as needed.     ALPRAZolam 0.25 MG tablet  Commonly known as: XANAX  Take 1 tablet (0.25 mg total) by mouth nightly as needed for Insomnia.     aluminum-magnesium hydroxide-simethicone 200-200-20 mg/5 mL Susp  Commonly known as: MAALOX  Take 30 mLs by mouth 4 (four) times daily as needed.     epoetin mily-epbx 20,000 unit/mL injection  Commonly known as: RETACRIT  Inject 0.67 mLs (13,400 Units total) into the skin every Mon, Wed, Fri.  Start taking on: October 7, 2024     fluconazole 100 MG tablet  Commonly known as: DIFLUCAN  Take 1 tablet (100 mg total) by mouth once daily. for 4 days     gabapentin 100 MG capsule  Commonly known as: NEURONTIN  Take 1 capsule (100 mg total) by mouth 3 (three) times daily.     * heparin (porcine) 1,000 unit/mL injection  Inject 4 mLs (4,000 Units total) into the vein as needed (line care after dialysis).     * heparin (porcine) 5,000 unit/mL injection  Inject 1 mL (5,000 Units total) into the skin every 8 (eight) hours.     HYDROmorphone 4 MG tablet  Commonly known as: DILAUDID  Take 1 tablet (4 mg total) by mouth every 4 (four) hours as needed for Pain.     insulin aspart U-100 100 unit/mL (3 mL) Inpn pen  Commonly known as: NovoLOG  Inject 0-5 Units into the skin before meals and at bedtime as needed (Hyperglycemia).     ketoconazole 2 % shampoo  Commonly known as: NIZORAL  Apply topically every other day.     LIDOcaine-prilocaine cream  Commonly known as: EMLA  Apply topically as needed (pre dialysis).     linezolid 600 mg Tab  Commonly known as: ZYVOX  Take 1 tablet (600 mg total) by mouth every 12 (twelve) hours. for 5 days     melatonin 3 mg  tablet  Commonly known as: MELATIN  Take 2 tablets (6 mg total) by mouth nightly as needed for Insomnia.     miconazole 2 % cream  Commonly known as: MICOTIN  Apply topically 2 (two) times daily.     ondansetron 4 MG Tbdl  Commonly known as: ZOFRAN-ODT  Take 1 tablet (4 mg total) by mouth every 6 (six) hours as needed (nausea).     oxyCODONE 10 mg 12 hr tablet  Commonly known as: OxyCONTIN  Take 1 tablet (10 mg total) by mouth every 12 (twelve) hours.     PIPERACILLIN-TAZOBACTAM 4.5G/100ML D5W IVPB (READY TO MIX)  Inject 100 mLs (4.5 g total) into the vein every 12 (twelve) hours. for 5 days     simethicone 80 MG chewable tablet  Commonly known as: MYLICON  Take 1 tablet (80 mg total) by mouth 3 (three) times daily as needed for Flatulence.           * This list has 2 medication(s) that are the same as other medications prescribed for you. Read the directions carefully, and ask your doctor or other care provider to review them with you.                CHANGE how you take these medications      cloNIDine 0.3 MG tablet  Commonly known as: CATAPRES  Take 1 tablet (0.3 mg total) by mouth 3 (three) times daily as needed (SBP > 150).  What changed:   when to take this  reasons to take this     hydrALAZINE 100 MG tablet  Commonly known as: APRESOLINE  Take 1 tablet (100 mg total) by mouth every 8 (eight) hours. Hold for SBP < 120 and before dialysis  What changed:   when to take this  additional instructions     isosorbide mononitrate 30 MG 24 hr tablet  Commonly known as: IMDUR  Take 1 tablet (30 mg total) by mouth once daily.  What changed:   medication strength  how much to take            CONTINUE taking these medications      calcitRIOL 0.25 MCG Cap  Commonly known as: ROCALTROL  Take 1 capsule (0.25 mcg total) by mouth once daily.     calcium carbonate 200 mg calcium (500 mg) chewable tablet  Commonly known as: TUMS  Take 2 tablets (1,000 mg total) by mouth 3 (three) times daily.     metoprolol succinate 50 MG 24 hr  tablet  Commonly known as: TOPROL-XL  Take 150 mg by mouth once daily.     olmesartan 40 MG tablet  Commonly known as: BENICAR  Take 1 tablet by mouth once daily     oxymetazoline 0.05 % nasal spray  Commonly known as: AFRIN  1 spray by Nasal route 2 (two) times daily.     sevelamer carbonate 800 mg Tab  Commonly known as: RENVELA  Take 2 tablets (1,600 mg total) by mouth 3 (three) times daily with meals.            STOP taking these medications      cholecalciferol (vitamin D3) 125 mcg (5,000 unit) Tab     ELIQUIS 2.5 mg Tab  Generic drug: apixaban              Indwelling Lines/Drains at time of discharge:   Lines/Drains/Airways       Drain  Duration                  Hemodialysis AV Fistula Left forearm -- days         Closed/Suction Drain 09/17/24 Tube - 1 Right RLQ Bulb 19 days         Open Drain 09/17/24 Tube - 2 Left LLQ [place] 1/4 inch 19 days         Colostomy 09/25/24 1200 LLQ 11 days                    Time spent on the discharge of patient: 50 minutes         Reva Meeks NP  Department of Hospital Medicine  Formerly [facility], [facility]

## 2024-10-06 NOTE — CARE UPDATE
10/06/24 1127   Patient Assessment/Suction   Level of Consciousness (AVPU) alert   Respiratory Effort Unlabored;Normal   Expansion/Accessory Muscles/Retractions no use of accessory muscles;no retractions;expansion symmetric   All Lung Fields Breath Sounds clear   Rhythm/Pattern, Respiratory unlabored;pattern regular;depth regular;chest wiggle adequate;no shortness of breath reported   Cough Frequency infrequent   Cough Type good   Skin Integrity   $ Wound Care Tech Time 15 min   Area Observed Bridge of nose   Skin Appearance without discoloration   PRE-TX-O2   Device (Oxygen Therapy) room air   $ Is the patient on Low Flow Oxygen? Yes   Pulse Oximetry Type Intermittent   $ Pulse Oximetry - Multiple Charge Pulse Oximetry - Multiple   Pulse 75   Resp 18   Preset CPAP/BiPAP Settings   Mode Of Delivery BiPAP S/T   $ CPAP/BiPAP Daily Charge BiPAP/CPAP Daily   Equipment Type V60   Education   $ Education BiPAP;15 min

## 2024-10-06 NOTE — ASSESSMENT & PLAN NOTE
Creatine stable for now. BMP reviewed- noted Estimated Creatinine Clearance: 9.1 mL/min (A) (based on SCr of 15.3 mg/dL (H)). according to latest data. Based on current GFR, CKD stage is end stage.  Monitor UOP and serial BMP and adjust therapy as needed. Renally dose meds. Avoid nephrotoxic medications and procedures.  Renal diet  Cleared for discharge by nephrology, resume outpatient schedule

## 2024-10-06 NOTE — PT/OT/SLP PROGRESS
Physical Therapy Treatment    Patient Name:  João Ham   MRN:  [id number]    Recommendations:     Discharge Recommendations: High Intensity Therapy  Discharge Equipment Recommendations: to be determined by next level of care  Barriers to discharge:  weakness, impaired endurance, impaired self care skills, impaired functional mobility, gait instability.     Assessment:     João Ham is a 44 y.o. male admitted with a medical diagnosis of Inguinal hernia of left side with gangrene.  He presents with the following impairments/functional limitations: weakness, impaired endurance, impaired self care skills, impaired functional mobility, gait instability, impaired balance, pain.    Pt found resting with HOB elevated, agreeable to skilled physical therapy session.     He transferred from supine to sitting EOB with CGA. Once EOB he was able to scoot with SBA to place feet flat on the floor for upcoming transfer.     He transferred from sitting to standing with CGA and without AD. He ambulated [id number] with CGA and without AD. He demonstrated slow antalgic gait pattern throughout as well as slight instability with directional changes and obstacle negotiation.     He ambulated back to his room where he was agreeable to sitting up in a bed side chair. Pt left sitting up in chair, chair alarm on, call light within reach, all needs met, and RN notified.     Rehab Prognosis: Fair; patient would benefit from acute skilled PT services to address these deficits and reach maximum level of function.    Recent Surgery: Procedure(s) (LRB):  LAPAROTOMY, EXPLORATORY (N/A)  COLON RESECTION  MOBILIZATION, SPLENIC FLEXURE 11 Days Post-Op    Plan:     During this hospitalization, patient to be seen daily to address the identified rehab impairments via therapeutic activities, therapeutic exercises, gait training and progress toward the following goals:    Plan of Care Expires:  11/05/24    Subjective     Chief Complaint: none  Patient/Family  Comments/goals: to get better and go home  Pain/Comfort:  Pain Rating 1: 0/10      Objective:     Communicated with RN prior to session.  Patient found HOB elevated with peripheral IV, telemetry, BERNABE drain upon PT entry to room.     General Precautions: Standard, fall  Orthopedic Precautions: N/A  Braces: N/A  Respiratory Status: Room air     Functional Mobility:  Bed Mobility:     Supine to Sit: contact guard assistance  Transfers:     Sit to Stand:  contact guard assistance with no AD  Gait: 4x395hg with CGA and without AD.       AM-PAC 6 CLICK MOBILITY          Treatment & Education:  Pt educated on importance of time OOB, importance of intermittent mobility, safe techniques for transfers/ambulation, discharge recommendations/options, and use of call light for assistance and fall prevention.      Patient left up in chair with all lines intact, call button in reach, chair alarm on, and RN notified..    GOALS:   Multidisciplinary Problems       Physical Therapy Goals          Problem: Physical Therapy    Goal Priority Disciplines Outcome Interventions   Physical Therapy Goal     PT, PT/OT Progressing    Description: Goals to be met by: 10/26/24     Patient will increase functional independence with mobility by performin. Supine to sit with Supervision  2. Sit to stand transfer with Supervision  3. Bed to chair transfer with Supervision using no AD  4. Gait  x 150 feet with Supervision using no AD                               Time Tracking:     PT Received On: 10/06/24  PT Start Time: 1050     PT Stop Time: 1102  PT Total Time (min): 12 min     Billable Minutes: Gait Training 12    Treatment Type: Treatment  PT/PTA: PTA     Number of PTA visits since last PT visit: 1     10/06/2024

## 2024-10-06 NOTE — ASSESSMENT & PLAN NOTE
"Patient with Hypoxic Respiratory failure which is Acute.  he is not on home oxygen. Supplemental oxygen was provided and noted- Oxygen Concentration (%):  [28] 28  Continue nightly BiPAP and supplemental O2, weaning as tolerated.  Pulmonology signed off 9/29    ABG  No results for input(s): "PH", "PO2", "PCO2", "HCO3", "BE" in the last 168 hours.  =================================  Had issues with some nasal bleeding/clotting  -Bipap humidification; nightly.    "

## 2024-10-06 NOTE — ASSESSMENT & PLAN NOTE
W/ Colonic perforation with abscess  CT abdomen shows Left inguinal hernia containing fat and air as well as marked inflammation extending from inside the pelvis, in the hernia and down into the left scrotum.   S/P Robotic sigmoid resection, Mobilization of splenic flexure, left inguinal canal exploration, and I&D 9/17 per Dr. Connors with repeat OR visit for anastomotic leak on 9/25  Continue regular diet:  Started 9/30  Still working on pain control, continue OxyContin twice daily in addition to the p.o. Dilaudid p.r.n.    Resolved, general surgery has cleared for discharge  Much better pain control with current regimen  Patient will f/u with general surgery in 1-2 weeks

## 2024-10-06 NOTE — ASSESSMENT & PLAN NOTE
Anemia is likely due to acute blood loss which was from surgery and chronic disease due to ESRD. Most recent hemoglobin and hematocrit are listed below.  Recent Labs     10/04/24  0728 10/05/24  0331 10/06/24  0625   HGB 7.7* 8.5* 8.5*   HCT 25.1* 28.2* 26.8*     Plan  - Monitor serial CBC: Daily  - Transfuse PRBC if patient becomes hemodynamically unstable, symptomatic or H/H drops below 7/21.  - Patient has received 3 units of PRBCs  - Patient's anemia is currently stable

## 2024-10-06 NOTE — PLAN OF CARE
Problem: Adult Inpatient Plan of Care  Goal: Plan of Care Review  Outcome: Met  Goal: Patient-Specific Goal (Individualized)  Outcome: Met  Goal: Absence of Hospital-Acquired Illness or Injury  Outcome: Met  Goal: Optimal Comfort and Wellbeing  Outcome: Met  Goal: Readiness for Transition of Care  Outcome: Met     Problem: Wound  Goal: Optimal Coping  Outcome: Met  Goal: Optimal Functional Ability  Outcome: Met  Goal: Absence of Infection Signs and Symptoms  Outcome: Met  Goal: Improved Oral Intake  Outcome: Met  Goal: Optimal Pain Control and Function  Outcome: Met  Goal: Skin Health and Integrity  Outcome: Met  Goal: Optimal Wound Healing  Outcome: Met     Problem: Infection  Goal: Absence of Infection Signs and Symptoms  Outcome: Met     Problem: Skin Injury Risk Increased  Goal: Skin Health and Integrity  Outcome: Met     Problem: Hemodialysis  Goal: Safe, Effective Therapy Delivery  Outcome: Met  Goal: Effective Tissue Perfusion  Outcome: Met  Goal: Absence of Infection Signs and Symptoms  Outcome: Met     Problem: Fall Injury Risk  Goal: Absence of Fall and Fall-Related Injury  Outcome: Met     Problem: Violence Risk or Actual  Goal: Anger and Impulse Control  Outcome: Met     Problem: Sepsis/Septic Shock  Goal: Optimal Coping  Outcome: Met  Goal: Absence of Bleeding  Outcome: Met  Goal: Blood Glucose Level Within Targeted Range  Outcome: Met  Goal: Absence of Infection Signs and Symptoms  Outcome: Met  Goal: Optimal Nutrition Intake  Outcome: Met

## 2024-10-06 NOTE — ASSESSMENT & PLAN NOTE
S/p open laparotomy with resection of affected colon and creation of colostomy 9/25  ID following: Continue IV Zyvox, IV Zosyn, and Diflucan  Continue mobilization as tolerated:  Out of bed to chair t.i.d. with meals  PT/OT following  Continue regular diet  Pain management  -Repeat CT on 10/2 with some new fluid collections- IR aspirated with no signs of infection   -continue current Abx with end date 10/09    Resolved, cleared for discharge from general surgery  Continue abx thru 10/9 and f/u with ID in 3 weeks

## 2024-10-06 NOTE — RESPIRATORY THERAPY
10/05/24 7056   Patient Assessment/Suction   Level of Consciousness (AVPU) alert   Skin Integrity   $ Wound Care Tech Time 15 min   Area Observed Bridge of nose   Skin Appearance without discoloration   PRE-TX-O2   Device (Oxygen Therapy) BIPAP   Oxygen Concentration (%) 28   Ready to Wean/Extubation Screen   FIO2<=50 (chart decimal) 0.28   Preset CPAP/BiPAP Settings   Mode Of Delivery BiPAP S/T   $ Initial CPAP/BiPAP Setup? No   $ Is patient using? Yes   Size of Mask Large   Sized Appropriately? Yes   Equipment Type V60   Humidifier not applicable   Ipap 14   EPAP (cm H2O) 7   Pressure Support (cm H2O) 7   Set Rate (Breaths/Min) 14   Patient CPAP/BiPAP Settings   RR Total (Breaths/Min) 31   Tidal Volume (mL) 906   VE Minute Ventilation (L/min) 27.8 L/min   Peak Inspiratory Pressure (cm H2O) 15   TiTOT (%) 21   Total Leak (L/Min) 0   Patient Trigger - ST Mode Only (%) 100   Education   $ Education BiPAP;15 min

## 2024-10-06 NOTE — ASSESSMENT & PLAN NOTE
Currently getting BiPAP nightly while in the hospital per pulmonology  Per patient's outpatient pulmonologist, he is also supposed to wear BiPAP nightly at home - will order for patient to continue at [facility]

## 2024-10-06 NOTE — PLAN OF CARE
Planned discharge to [facility] leon[facility] ([facility]) to visit bedside shortly to confirm readiness - patient aware

## 2024-10-06 NOTE — ASSESSMENT & PLAN NOTE
Chronic, controlled. Latest blood pressure and vitals reviewed-     Temp:  [97.5 °F (36.4 °C)-98.3 °F (36.8 °C)]   Pulse:  [74-86]   Resp:  [17-18]   BP: (102-124)/(63-73)   SpO2:  [92 %-100 %] .   Home meds for hypertension were reviewed and noted below.   Hypertension Medications               cloNIDine (CATAPRES) 0.3 MG tablet TAKE 1 TABLET BY MOUTH THREE TIMES DAILY    hydrALAZINE (APRESOLINE) 100 MG tablet Take 1 tablet (100 mg total) by mouth 3 (three) times daily.    isosorbide mononitrate (IMDUR) 120 MG 24 hr tablet Take 120 mg by mouth once daily.    metoprolol succinate (TOPROL-XL) 50 MG 24 hr tablet Take 150 mg by mouth once daily.    olmesartan (BENICAR) 40 MG tablet Take 1 tablet by mouth once daily        While in the hospital, will manage blood pressure as follows; Continue current antihypertensive regimen: hydralazine, losartan, clonidine, and metoprolol.  Patient was apparently getting nitro paste q.6 hours.  This was discontinued and his home isosorbide was resumed.    Will utilize p.r.n. blood pressure medication only if patient's blood pressure greater than 180/110 and he develops symptoms such as worsening chest pain or shortness of breath.

## 2024-10-06 NOTE — PROGRESS NOTES
INPATIENT NEPHROLOGY Progress Note  [facility] NEPHROLOGY    João Ham  10/06/2024    Reason for consultation:  ESRD    Chief Complaint:   Chief Complaint   Patient presents with    Fatigue    Groin Swelling     X 2 days.  Hx of Kidney failure and CHF     History of Present Illness:  Per H and P    Patient is a 44 year old male with history of ESDR on dialysis MWF, awaiting kidney transplant, HTN, presented to ED with 3 days history of left groin pain and swelling. States swelling comes every time he cough and is being painful. Patient has low grade fever 99s at home. He has been vomiting bilious fluid last 2-3 days. No diarrhea or abdominal pain.      In the ED CT abdomen showed Left inguinal hernia containing fat and air as well as marked inflammation extending from inside the pelvis, in the hernia and down into the left scrotum. This is consistent with an infected inguinal hernia, possible gangrene. WBC was 14, patient was tachycardic. General surgery was consulted and patient was admitted under hospitalist.     9/19  avf nonfunctional.   No sob or chest pain.  Has post op pain.  AFVSS  9/20  afvss.  Pt doesn't want to see previous vascular surgeon who put his avf in.  No alternative available.  Transfer center called.  Has temporary trialysis catheter.  Patient seen on hemodialysis for uremic clearance and ultrafiltration.    9/21  2.5L off w/HD yesterday.  Tmax 101.8, pressures ok.  Lab results reviewed, Hgb low but better than yesterday.  C/o post op pain, no other complaints.  9/22 POD 5.  VSS, lab results reviewed.  Hgb 7.6, added epo to HD orders.  C/o gas pains, plans to move around more today.  Next HD tomorrow.  9/23 VSS. HD today. Transfuse with next HD as needed if Hgb stil low.  9/24 VSS. Appreciate input from Urology, Gen Surgery, Vascular Surgery, ID on this complex patient. Plan for fistulogram tomorrow. CXR yesterday shows trialysis line tip in appropriate cocation. Will attempt HD today since  we skipped yesterday due to nurse and patient concerns that the line may be malpositioned.  9/25 VSS. s/p cephalic vein dilation by Dr. Robledo yesterday, tolerated HD very well. Hold HD today. Hypotension this AM, low grade fever yesterday, WBC elevated, received IVF bolus and Midodrine. Continues to be infected per Surgery, we can remove trialysis line now that AVF is treated and usable... Consider ICU. Stopped hydralazine and olmesartan, decreased Clonidine to 0.1 BID, not sure what to do with Metoprolol 150mg and Hydralazine 120mg... He may be sob/hypoxic due to anemia, suggest 1 PRBC instead of IVF, since Hgb is 7.    Addendum @ 10:47 AM  Seen at bedside in ICU with Dr. Rodriguez and Dr. Au. Mother present as well. Reviewed imaging. Agree with plan for urgent ex lap, remove central line and place mid line, keep current abx, hold on transfusion and dialysis and reassess later. ABG and fresh set of cx now, re-evalaute later.    Addendum @ 4:36 PM  Discussed briefly with Dr. Connors, dialysis nurse Brant and with ICU nurse. Patient had colostomy placed due to anastomotic leak and had 2 PRBC given. Upon return to floor was hypertensive with SBP > 200 and NPO. Advised Cleviprex drip for now (earlier to day he was on max dose Clonidine, mad dose Hydralazine, max dose Olmesartan, 150mg Metoprolol-XL and 120mg of Imdur - none of which he can get due to NPO) and will do urgent HD with UF 2L or so as tolerated.  9/26  POD 1 s/p . Exploratory laparotomy,. Colon resection end-colostomy, Mobilization of splenic flexure, and left inguinal canal exploration.  Post pain. No sob.  Sleepy.    9/27  AFVSS.  Some post op pain.  No sob.  No angina.   Patient seen on hemodialysis for uremic clearance and ultrafiltration.    9/28 s/p 3.8 liter uf yesterday.     AFVSS.  Post op pain.  Dry cough.  No sob.  Projectile vomiting earlier today per patient  9/30 VSS. HD today.  10/1 GS note reviewed, working on pain control, tolerating  diet  10/2 no major events overnight- due for HD today  10/3 fever, tachycardia, abd pain and stagnant leukocytosis yest- CT with possible fluid collection- may need IR drainage- remains on IV antbx  10/4 fluid collection neg for infection- working on discharge plan to NSR today- will do HD before discharge- pain control ongoing issue- hospital medicine aware  10/5  Plan is now d/c to rehab tomorrow, working on pain control.  2.5L UF w/HD yesterday.  VSS, lab results reviewed.  Added on PO4 level to labs--hasn't had one for several days--resulted at 8.5, so increased renvela dose.  Notified pt that dosing may change based on lab results.  10/6 VSS. OK to dc to rehab.    Plan of Care:    ESRD on HD MWF  --continue dialysis per routine    Anemia of CKD  --Hb drop- bump LOR with HD today  --transfused this admission- no acute transfusion needs today    Secondary HPT  --renal diet  --continue binders with meals    Hypertension  --continue current BP meds  --uf with hd as needed    Hyponatremia  Hyperkalemia  Acidosis  --adjust dialysate  --renal diet    AVF malfunction fixed  --appreciate Vascular eval, s/p fistulogram and cephalic vein stenosis dilation on 9/23 by Dr. Salvador.    Inguinal hernia of left side with gangrene - W/ Colonic perforation with abscess   --S/P Robotic sigmoid resection, Mobilization of splenic flexure, left inguinal canal exploration, and I&D 9/17 per Dr. Connors   --dose antbx for CrCl < 10/HD  --CT 10/2 with new fluid collection- IR aspiration neg for infection    Thank you for allowing us to participate in this patient's care. We will continue to follow.    Vital Signs:  Temp Readings from Last 3 Encounters:   10/06/24 98 °F (36.7 °C) (Oral)   08/22/24 97.3 °F (36.3 °C) (Temporal)   07/30/24 98.6 °F (37 °C) (Oral)       Pulse Readings from Last 3 Encounters:   10/06/24 74   08/29/24 (!) 115   08/22/24 110       BP Readings from Last 3 Encounters:   10/06/24 124/68   08/29/24 99/68   08/22/24  (!) 137/91       Weight:  Wt Readings from Last 3 Encounters:   09/18/24 134 kg (295 lb 6.7 oz)   08/29/24 131.3 kg (289 lb 7.4 oz)   08/22/24 131 kg (288 lb 12.8 oz)       Medications:  No current facility-administered medications on file prior to encounter.     Current Outpatient Medications on File Prior to Encounter   Medication Sig Dispense Refill    metoprolol succinate (TOPROL-XL) 50 MG 24 hr tablet Take 150 mg by mouth once daily.      olmesartan (BENICAR) 40 MG tablet Take 1 tablet by mouth once daily 90 tablet 0    sevelamer carbonate (RENVELA) 800 mg Tab Take 2 tablets (1,600 mg total) by mouth 3 (three) times daily with meals. 180 tablet 11     Scheduled Meds:   epoetin mily-epbx  100 Units/kg Intravenous Every Mon, Wed, Fri    fluconazole  100 mg Oral Daily    gabapentin  100 mg Oral TID    heparin (porcine)  5,000 Units Subcutaneous Q8H    hydrALAZINE  100 mg Oral Q8H    HYDROmorphone  1 mg Intravenous Once    isosorbide mononitrate  30 mg Oral Daily    ketoconazole   Topical (Top) Every Other Day    LIDOcaine-prilocaine   Topical (Top) Once    linezolid  600 mg Oral Q12H    losartan  100 mg Oral Daily    metoprolol succinate  150 mg Oral Daily    miconazole   Topical (Top) BID    oxyCODONE  10 mg Oral Q12H    piperacillin-tazobactam (Zosyn) IV (PEDS and ADULTS) (extended infusion is not appropriate)  4.5 g Intravenous Q12H    sevelamer carbonate  2,400 mg Oral TID WM     Continuous Infusions:        PRN Meds:.  Current Facility-Administered Medications:     0.9% NaCl, , Intravenous, PRN    acetaminophen, 650 mg, Oral, Q4H PRN    ALPRAZolam, 0.25 mg, Oral, Nightly PRN    aluminum-magnesium hydroxide-simethicone, 30 mL, Oral, QID PRN    cloNIDine, 0.3 mg, Oral, TID PRN    dextrose 10%, 12.5 g, Intravenous, PRN    dextrose 10%, 12.5 g, Intravenous, PRN    dextrose 10%, 12.5 g, Intravenous, PRN    dextrose 10%, 25 g, Intravenous, PRN    dextrose 10%, 25 g, Intravenous, PRN    dextrose 10%, 25 g,  "Intravenous, PRN    diphenhydrAMINE, 25 mg, Oral, Q6H PRN    glucagon (human recombinant), 1 mg, Intramuscular, PRN    glucose, 16 g, Oral, PRN    glucose, 24 g, Oral, PRN    heparin (porcine), 4,000 Units, Intravenous, PRN    HYDROmorphone, 4 mg, Oral, Q4H PRN    insulin aspart U-100, 0-5 Units, Subcutaneous, QID (AC + HS) PRN    iohexoL, 100 mL, Intravenous, ONCE PRN    LIDOcaine-prilocaine, , Topical (Top), PRN    magnesium oxide, 800 mg, Oral, PRN    magnesium oxide, 800 mg, Oral, PRN    melatonin, 6 mg, Oral, Nightly PRN    naloxone, 0.02 mg, Intravenous, PRN    ondansetron, 4 mg, Intravenous, Q6H PRN    oxyCODONE-acetaminophen, 1 tablet, Oral, Q4H PRN    potassium bicarbonate, 35 mEq, Oral, PRN    potassium bicarbonate, 50 mEq, Oral, PRN    potassium bicarbonate, 60 mEq, Oral, PRN    potassium, sodium phosphates, 2 packet, Oral, PRN    potassium, sodium phosphates, 2 packet, Oral, PRN    potassium, sodium phosphates, 2 packet, Oral, PRN    promethazine (PHENERGAN) 12.5 mg in 0.9% NaCl 50 mL IVPB, 12.5 mg, Intravenous, Q6H PRN    simethicone, 1 tablet, Oral, TID PRN    sodium chloride 0.9%, 250 mL, Intravenous, PRN    sodium chloride 0.9%, 3 mL, Intravenous, Q12H PRN    Review of Systems:  Neg    Physical Exam:    /68   Pulse 74   Temp 98 °F (36.7 °C) (Oral)   Resp 17   Ht 6' 2" (1.88 m)   Wt 134 kg (295 lb 6.7 oz)   SpO2 (!) 92%   BMI 37.93 kg/m²     General Appearance:    Alert, cooperative, no distress, appears stated age   Head:    Normocephalic, without obvious abnormality, atraumatic   Eyes:    PER, conjunctiva/corneas clear, EOM's intact in both eyes        Throat:   Lips, mucosa, and tongue normal; teeth and gums normal   Back:     Symmetric, no curvature, ROM normal, no CVA tenderness   Lungs:     Clear to auscultation bilaterally, respirations unlabored   Chest wall:    No tenderness or deformity   Heart:    Regular rate and rhythm, S1 and S2 normal, no murmur, rub   or gallop   Abdomen:  "    Soft, non-tender, bowel sounds active all four quadrants,     no masses, no organomegaly   Extremities:   Extremities normal, atraumatic, no cyanosis or edema   Pulses:   2+ and symmetric all extremities   MSK:   No joint or muscle swelling, tenderness or deformity   Skin:   Skin color, texture, turgor normal, no rashes or lesions   Neurologic:   CNII-XII intact, normal strength and sensation       Throughout.  No flap     Results:  Recent Labs   Lab 10/04/24  0728 10/05/24  0331 10/06/24  0625   * 129* 131*   K 4.4 4.7 5.0   CL 89* 94* 92*   CO2 22* 18* 19*   BUN 53* 41* 51*   CREATININE 15.0* 12.4* 15.3*    103 89       Recent Labs   Lab 10/04/24  0728 10/05/24  0331 10/06/24  0625   CALCIUM 9.5 9.1 9.7   ALBUMIN 3.4* 3.7 3.5   PHOS  --  8.5*  --    MG 2.3 2.4 2.5       Recent Labs   Lab 02/02/23  0745 05/19/23  1022 06/19/23  1031   PTH, Intact 225.6 H 335.8 H 319.0 H       Recent Labs   Lab 10/03/24  1116 10/03/24  1643 10/03/24  1918 10/04/24  0728 10/04/24  1719 10/05/24  0653 10/05/24  1127 10/05/24  1549 10/05/24  1915 10/06/24  0707   POCTGLUCOSE 99 130* 130* 141* 134* 108 104 125* 127* 111*       Recent Labs   Lab 10/10/22  2005 06/22/23  0446 07/16/24  0510   Hemoglobin A1C 5.5 5.5 6.1       Recent Labs   Lab 10/04/24  0728 10/05/24  0331 10/06/24  0625   WBC 10.97 9.81 9.38   HGB 7.7* 8.5* 8.5*   HCT 25.1* 28.2* 26.8*    365 410   MCV 95 100* 98   MCHC 30.7* 30.1* 31.7*   MONO 12.6  1.4* 14.0  1.4* 14.2  1.3*   EOSINOPHIL 2.1 2.0 2.8       Recent Labs   Lab 10/04/24  0728 10/05/24  0331 10/06/24  0625   BILITOT 0.4 0.4 0.4   PROT 8.4 8.9* 8.7*   ALBUMIN 3.4* 3.7 3.5   ALKPHOS 91 101 92   ALT 25 23 23   AST 21 21 22       Recent Labs   Lab 10/10/22  1645 01/09/23  1123 06/19/23  1622 07/17/24  0505 09/19/24  1435   Color, UA Yellow   < > Straw Yellow Yellow   Appearance, UA Clear   < > Clear Hazy A Clear   pH, UA 6.0   < > 6.0 6.0 8.0   Specific Gravity, UA 1.025   < > 1.010  1.020 1.010   Protein, UA 2+ A   < > 2+ A 3+ A 2+ A   Glucose, UA Negative   < > Negative Negative Negative   Ketones, UA Negative   < > Negative Negative Negative   Urobilinogen, UA Negative  --   --  Negative Negative   Bilirubin (UA) Negative   < > Negative Negative Negative   Occult Blood UA 1+ A   < > Negative 2+ A 2+ A   Nitrite, UA Negative   < > Negative Negative Negative   RBC, UA 1   < > 0 6 H 4   WBC, UA 0   < > 2 52 H 21 H   Bacteria None   < > None None None   Hyaline Casts, UA 0   < > 0 0 0    < > = values in this interval not displayed.       Recent Labs   Lab 07/15/24  1824 09/17/24  0928 09/25/24  1051   POC PH  --   --  7.404   POC PCO2  --   --  37.7   POC HCO3  --   --  23.6 L   POC PO2  --   --  87   POC SATURATED O2  --   --  97   POC BE  --   --  -1   Sample VENOUS VENOUS ARTERIAL       Microbiology Results (last 7 days)       Procedure Component Value Units Date/Time    Culture, Anaerobic [[id number]] Collected: 10/03/24 1053    Order Status: Completed Specimen: Body Fluid from Abdomen Updated: 10/05/24 0952     Anaerobic Culture No anaerobes isolated    Narrative:      Abdomen, Intra Abdominal Fluid.    Culture, Body Fluid (Aerobic) w/ GS [[id number]] Collected: 10/03/24 1053    Order Status: Completed Specimen: Body Fluid Updated: 10/05/24 0952     AEROBIC CULTURE - FLUID No growth     Gram Stain Result Rare WBC's      No organisms seen    Narrative:      Abdomen, Intra Abdominal Fluid.    Aerobic culture [[id number]] Collected: 10/03/24 1053    Order Status: Canceled Specimen: Body Fluid from Abdomen     Fungus culture [[id number]] Collected: 09/25/24 1413    Order Status: Completed Specimen: Incision site from Abdomen Updated: 10/03/24 0444     Fungus (Mycology) Culture No fungal growth to date    Culture, Anaerobe [[id number]]  (Abnormal) Collected: 09/25/24 1413    Order Status: Completed Specimen: Incision site from Abdomen Updated: 10/01/24 1418     Anaerobic Culture PARABACTEROIDES  AISHWARYAASONIS  Few  Beta Lactamase positive      Blood culture [[id number]] Collected: 09/25/24 1111    Order Status: Completed Specimen: Blood from Peripheral, Antecubital, Right Updated: 09/30/24 1432     Blood Culture, Routine No growth after 5 days.    Narrative:      [id number]    Blood culture [[id number]] Collected: 09/25/24 1111    Order Status: Completed Specimen: Blood from Peripheral, Forearm, Right Updated: 09/30/24 1432     Blood Culture, Routine No growth after 5 days.    Narrative:      [id number]    Blood culture [[id number]] Collected: 09/24/24 1513    Order Status: Completed Specimen: Blood Updated: 09/29/24 1632     Blood Culture, Routine No growth after 5 days.    Narrative:      Collection has been rescheduled by [email or web address] at 09/23/2024 14:35 Reason:   Patient unavailable dialysis   Collection has been rescheduled by MYB at 09/23/2024 18:44 Reason:   Unable to collect  Collection has been rescheduled by CZB at 09/23/2024 19:02 Reason:   Unable to collect  Collection has been rescheduled by RE1 at 09/24/2024 09:43 Reason:   Spoke to the patient's nurse about unable to collect she is   contacting Lambert and the doctor  Collection has been rescheduled by [email or web address] at 09/23/2024 14:35 Reason:   Patient unavailable dialysis   Collection has been rescheduled by MYB at 09/23/2024 18:44 Reason:   Unable to collect  Collection has been rescheduled by CZB at 09/23/2024 19:02 Reason:   Unable to collect  Collection has been rescheduled by RE1 at 09/24/2024 09:43 Reason:   Spoke to the patient's nurse about unable to collect she is   contacting Lambert and the doctor    Blood culture [[id number]] Collected: 09/24/24 1122    Order Status: Completed Specimen: Blood Updated: 09/29/24 1232     Blood Culture, Routine No growth after 5 days.    Narrative:      Collection has been rescheduled by [email or web address] at 09/23/2024 14:35 Reason:   Patient unavailable dialysis   Collection has been rescheduled by MYB at 09/23/2024 18:44 Reason:   Unable to  collect  Collection has been rescheduled by CZB at 09/23/2024 19:02 Reason:   Unable to collect  Collection has been rescheduled by RE1 at 09/24/2024 09:43 Reason:   Spoke to the patient's nurse about unable to collect she is   contacting Lambert and the doctor  Collection has been rescheduled by [email or web address] at 09/23/2024 14:35 Reason:   Patient unavailable dialysis   Collection has been rescheduled by MYB at 09/23/2024 18:44 Reason:   Unable to collect  Collection has been rescheduled by CZB at 09/23/2024 19:02 Reason:   Unable to collect  Collection has been rescheduled by RE1 at 09/24/2024 09:43 Reason:   Spoke to the patient's nurse about unable to collect she is   contacting Lambert and the doctor          Patient care time was spent personally by me on the following activities: > 35 minutes  Obtaining a history.  Examination of patient.  Providing medical care at the patients bedside.  Developing a treatment plan with patient or surrogate and bedside caregivers.  Ordering and reviewing laboratory studies, radiographic studies, pulse oximetry.  Ordering and performing treatments and interventions.  Evaluation of patient's response to treatment.  Discussions with consultants while on the unit and immediately available to the patient.  Re-evaluation of the patient's condition.  Documentation in the medical record.      Jeremy Madrid MD    [facility] [place]  [phone]

## 2024-10-06 NOTE — ASSESSMENT & PLAN NOTE
This patient does have evidence of infective focus  My overall impression is sepsis.  Source: Abdominal  Antibiotics given-   Antibiotics (72h ago, onward)      Start     Stop Route Frequency Ordered    10/02/24 1200  linezolid tablet 600 mg         -- Oral Every 12 hours 10/02/24 1052    09/24/24 1800  piperacillin-tazobactam 4.5 g in dextrose 5 % 100 mL IVPB (ready to mix)         -- IV Every 12 hours (non-standard times) 09/24/24 1035        fluconazole (DIFLUCAN) IVPB 200 mg  Start Date/Time (Original Order): 09/22/24 1500   Ordered Dose: 200 mg Route: Intravenous Frequency: Every 24 hours (non-standard times)       Source control achieved by: IV antibiotics and surgical intervention  =======================================================  Blood culture 09/07/2024: NGTD   Urine culture 09/19/2024: NGTD   Blood culture 09/24/2024:  NGTD   Left groin drainage culture 09/24/2024:  E coli only resistant to tetracycline, cefazolin, ampicillin and ceftriaxone  Abdomen incision site culture 09/24/2024:  E coli only sensitive to ceftriaxone and tetracycline  =====================================================  Continuing to improve  WBC continues trending down   Infectious disease has signed off   Continue current antibiotic regimen thru 10/9

## 2024-10-17 ENCOUNTER — HOSPITAL ENCOUNTER (OUTPATIENT)
Facility: HOSPITAL | Age: 44
Discharge: HOME | End: 2024-10-21
Attending: EMERGENCY MEDICINE | Admitting: INTERNAL MEDICINE
Payer: COMMERCIAL

## 2024-10-17 DIAGNOSIS — Z99.2 ESRD ON HEMODIALYSIS: ICD-10-CM

## 2024-10-17 DIAGNOSIS — J96.01 ACUTE HYPOXIC RESPIRATORY FAILURE: ICD-10-CM

## 2024-10-17 DIAGNOSIS — T81.49XA POST-OPERATIVE WOUND ABSCESS: Primary | ICD-10-CM

## 2024-10-17 DIAGNOSIS — R07.9 CHEST PAIN: ICD-10-CM

## 2024-10-17 DIAGNOSIS — N18.6 ESRD (END STAGE RENAL DISEASE): ICD-10-CM

## 2024-10-17 DIAGNOSIS — N18.6 ESRD ON HEMODIALYSIS: ICD-10-CM

## 2024-10-17 DIAGNOSIS — A41.9 SEPSIS: ICD-10-CM

## 2024-10-17 LAB
ALBUMIN SERPL BCP-MCNC: 3.9 G/DL (ref 3.5–5.2)
ALP SERPL-CCNC: 92 U/L (ref 55–135)
ALT SERPL W/O P-5'-P-CCNC: 27 U/L (ref 10–44)
ANION GAP SERPL CALC-SCNC: 11 MMOL/L (ref 8–16)
AST SERPL-CCNC: 25 U/L (ref 10–40)
BASOPHILS # BLD AUTO: 0.02 K/UL (ref 0–0.2)
BASOPHILS NFR BLD: 0.2 % (ref 0–1.9)
BILIRUB SERPL-MCNC: 0.5 MG/DL (ref 0.1–1)
BUN SERPL-MCNC: 22 MG/DL (ref 6–20)
CALCIUM SERPL-MCNC: 9.9 MG/DL (ref 8.7–10.5)
CHLORIDE SERPL-SCNC: 94 MMOL/L (ref 95–110)
CO2 SERPL-SCNC: 29 MMOL/L (ref 23–29)
CREAT SERPL-MCNC: 10.1 MG/DL (ref 0.5–1.4)
DIFFERENTIAL METHOD BLD: ABNORMAL
EOSINOPHIL # BLD AUTO: 0.4 K/UL (ref 0–0.5)
EOSINOPHIL NFR BLD: 3.7 % (ref 0–8)
ERYTHROCYTE [DISTWIDTH] IN BLOOD BY AUTOMATED COUNT: 21.6 % (ref 11.5–14.5)
EST. GFR  (NO RACE VARIABLE): 6 ML/MIN/1.73 M^2
GLUCOSE SERPL-MCNC: 92 MG/DL (ref 70–110)
HCT VFR BLD AUTO: 30 % (ref 40–54)
HGB BLD-MCNC: 9.1 G/DL (ref 14–18)
IMM GRANULOCYTES # BLD AUTO: 0.02 K/UL (ref 0–0.04)
IMM GRANULOCYTES NFR BLD AUTO: 0.2 % (ref 0–0.5)
LDH SERPL L TO P-CCNC: 0.9 MMOL/L (ref 0.5–2.2)
LYMPHOCYTES # BLD AUTO: 1.2 K/UL (ref 1–4.8)
LYMPHOCYTES NFR BLD: 11.6 % (ref 18–48)
MCH RBC QN AUTO: 30.8 PG (ref 27–31)
MCHC RBC AUTO-ENTMCNC: 30.3 G/DL (ref 32–36)
MCV RBC AUTO: 102 FL (ref 82–98)
MONOCYTES # BLD AUTO: 1.3 K/UL (ref 0.3–1)
MONOCYTES NFR BLD: 13.3 % (ref 4–15)
NEUTROPHILS # BLD AUTO: 7 K/UL (ref 1.8–7.7)
NEUTROPHILS NFR BLD: 71 % (ref 38–73)
NRBC BLD-RTO: 0 /100 WBC
PLATELET # BLD AUTO: 302 K/UL (ref 150–450)
PMV BLD AUTO: 9.6 FL (ref 9.2–12.9)
POTASSIUM SERPL-SCNC: 5 MMOL/L (ref 3.5–5.1)
PROT SERPL-MCNC: 8.7 G/DL (ref 6–8.4)
RBC # BLD AUTO: 2.95 M/UL (ref 4.6–6.2)
SAMPLE: NORMAL
SODIUM SERPL-SCNC: 134 MMOL/L (ref 136–145)
WBC # BLD AUTO: 9.91 K/UL (ref 3.9–12.7)

## 2024-10-17 PROCEDURE — 80053 COMPREHEN METABOLIC PANEL: CPT | Performed by: EMERGENCY MEDICINE

## 2024-10-17 PROCEDURE — 96365 THER/PROPH/DIAG IV INF INIT: CPT

## 2024-10-17 PROCEDURE — 36415 COLL VENOUS BLD VENIPUNCTURE: CPT | Performed by: EMERGENCY MEDICINE

## 2024-10-17 PROCEDURE — 85025 COMPLETE CBC W/AUTO DIFF WBC: CPT | Performed by: EMERGENCY MEDICINE

## 2024-10-17 PROCEDURE — 25000003 PHARM REV CODE 250: Performed by: EMERGENCY MEDICINE

## 2024-10-17 PROCEDURE — 63600175 PHARM REV CODE 636 W HCPCS: Performed by: EMERGENCY MEDICINE

## 2024-10-17 PROCEDURE — 87040 BLOOD CULTURE FOR BACTERIA: CPT | Mod: 59 | Performed by: EMERGENCY MEDICINE

## 2024-10-17 PROCEDURE — 99285 EMERGENCY DEPT VISIT HI MDM: CPT | Mod: 25

## 2024-10-17 RX ADMIN — PIPERACILLIN AND TAZOBACTAM 4.5 G: 4; .5 INJECTION, POWDER, LYOPHILIZED, FOR SOLUTION INTRAVENOUS; PARENTERAL at 07:10

## 2024-10-17 NOTE — ED PROVIDER NOTES
Encounter Date: 10/17/2024       History     Chief Complaint   Patient presents with    Wound Infection     + wound culture for s/p hernia repair     Hyperkalemia     Is currently being dialyzed daily due to potassium level      HPI patient is a 44-year-old man with an unfortunate history of uninfected inguinal hernia mesh with gangrene resulting in complicated wound with wound VAC placement and colostomy.  He also has a history of end-stage renal disease on hemodialysis and presents to the emergency department today for multiple issues.  He had a culture obtained from his left groin wound where the wound VAC is in place on 10/15 that came back positive today for Gram-negative rods.  His right arm MID line was clogged and was forcefully being flushed according to the patient and left him with right shoulder and arm pain subsequently.  He wants the midline removed and another 1 placed.  He states his potassium has been high and he had to receive dialysis yesterday but then again today.  He believes this is due to the healthcare workers at the rehabilitation facility not administering his phosphorus binding medications prior to eating.  Review of patient's allergies indicates:   Allergen Reactions    Mushroom Swelling     Tongue swells    Tongue swells       Tongue swells     Past Medical History:   Diagnosis Date    Allergy     Anemia     Anxiety     CHF (congestive heart failure)     Depression     High blood pressure with chronic kidney disease, stage 5 chronic kidney disease or end stage renal disease     Hypertension      Past Surgical History:   Procedure Laterality Date    ABSCESS DRAINAGE Left 9/17/2024    Procedure: DRAINAGE, ABSCESS, GROIN;  Surgeon: Galileo Connors MD;  Location: [facility] OR;  Service: General;  Laterality: Left;    COLON RESECTION  9/25/2024    Procedure: COLON RESECTION;  Surgeon: Galileo Connors MD;  Location: [facility] OR;  Service: General;;    FISTULOGRAM Bilateral 4/30/2024    Procedure:  Fistulogram;  Surgeon: Khoobehi, Ali, MD;  Location: [facility] CATH/EP LAB;  Service: Vascular;  Laterality: Bilateral;    FISTULOGRAM Left 9/24/2024    Procedure: Fistulogram;  Surgeon: Lorraine Salvador MD;  Location: [facility] CATH/EP LAB;  Service: General;  Laterality: Left;    HERNIA REPAIR Right     With mesh    INSERTION, CATHETER, TUNNELED N/A 6/22/2023    Procedure: Insertion,catheter,tunneled;  Surgeon: Everett Caicedo MD;  Location: [facility] OR;  Service: General;  Laterality: N/A;    LAPAROTOMY, EXPLORATORY N/A 9/25/2024    Procedure: LAPAROTOMY, EXPLORATORY;  Surgeon: Galileo Connors MD;  Location: [facility];  Service: General;  Laterality: N/A;    MOBILIZATION OF SPLENIC FLEXURE  9/25/2024    Procedure: MOBILIZATION, SPLENIC FLEXURE;  Surgeon: Galileo Connors MD;  Location: [facility];  Service: General;;    PERCUTANEOUS TRANSLUMINAL ANGIOPLASTY OF ARTERIOVENOUS FISTULA Left 4/30/2024    Procedure: PTA, AV FISTULA;  Surgeon: Khoobehi, Ali, MD;  Location: [facility] CATH/EP LAB;  Service: Vascular;  Laterality: Left;    PERCUTANEOUS TRANSLUMINAL ANGIOPLASTY OF ARTERIOVENOUS FISTULA Left 9/24/2024    Procedure: PTA, AV FISTULA;  Surgeon: Lorraine Salvador MD;  Location: [facility] CATH/EP LAB;  Service: General;  Laterality: Left;    PHLEBOGRAPHY N/A 7/19/2024    Procedure: Venogram;  Surgeon: Khoobehi, Ali, MD;  Location: [facility] CATH/EP LAB;  Service: Vascular;  Laterality: N/A;    REMOVAL, TUNNELED CATH Right 7/19/2024    Procedure: REMOVAL, TUNNELED CATH;  Surgeon: Khoobehi, Ali, MD;  Location: [facility] CATH/EP LAB;  Service: Vascular;  Laterality: Right;    ROBOT-ASSISTED LAPAROSCOPIC RESECTION OF SIGMOID COLON USING DA DELANO XI N/A 9/17/2024    Procedure: XI ROBOTIC SIGMOID RESECTION, WITH ANASTAMOSIS;  Surgeon: Galileo Connors MD;  Location: [facility];  Service: General;  Laterality: N/A;  possible open have instruments available     No family history on file.  Social History     Tobacco Use    Smoking status: Never     Passive  exposure: Never    Smokeless tobacco: Never   Substance Use Topics    Alcohol use: Never    Drug use: Never     Review of Systems   Constitutional:  Negative for fever.   HENT:  Negative for sore throat.    Respiratory:  Negative for shortness of breath.    Cardiovascular:  Negative for chest pain.   Gastrointestinal:  Negative for nausea.   Genitourinary:  Positive for decreased urine volume. Negative for dysuria.        Positive for left groin pain radiating to his infraumbilical region.   Musculoskeletal:  Positive for arthralgias (Right shoulder pain). Negative for back pain.   Skin:  Positive for wound. Negative for rash.   Hematological:  Does not bruise/bleed easily.       Physical Exam     Initial Vitals [10/17/24 1652]   BP Pulse Resp Temp SpO2   130/88 78 18 98.5 °F (36.9 °C) 98 %      MAP       --         Physical Exam    Constitutional: Vital signs are normal. He appears well-developed and well-nourished.  Non-toxic appearance. No distress.   HENT:   Head: Normocephalic and atraumatic.   Eyes: EOM are normal. Pupils are equal, round, and reactive to light.   Neck: Neck supple. No JVD present.   Normal range of motion.  Cardiovascular:  Normal rate, regular rhythm, normal heart sounds and intact distal pulses.     Exam reveals no gallop and no friction rub.       No murmur heard.  AV fistula in left forearm with thrill.  Midline in right upper arm.   Pulmonary/Chest: Breath sounds normal. He has no wheezes. He has no rhonchi. He has no rales.   Abdominal: Abdomen is soft. Bowel sounds are normal. There is no abdominal tenderness.   There is a colostomy bag with brown stool.  There is a healing wound the infraumbilical region with clear drainage and appropriate appearing granulation tissue.  Wound in the left groin/inguinal area with wound VAC in place There is no rebound and no guarding.   Musculoskeletal:         General: Normal range of motion.      Cervical back: Normal range of motion and neck supple.  No rigidity.     Neurological: He is alert and oriented to person, place, and time. He has normal strength and normal reflexes. No cranial nerve deficit or sensory deficit. He exhibits normal muscle tone. Coordination normal. GCS eye subscore is 4. GCS verbal subscore is 5. GCS motor subscore is 6.   Skin: Skin is warm and dry.   Psychiatric: He has a normal mood and affect. His speech is normal and behavior is normal. He is not actively hallucinating.         ED Course   Procedures  Labs Reviewed   CBC W/ AUTO DIFFERENTIAL - Abnormal       Result Value    WBC 9.91      RBC 2.95 (*)     Hemoglobin 9.1 (*)     Hematocrit 30.0 (*)      (*)     MCH 30.8      MCHC 30.3 (*)     RDW 21.6 (*)     Platelets 302      MPV 9.6      Immature Granulocytes 0.2      Gran # (ANC) 7.0      Immature Grans (Abs) 0.02      Lymph # 1.2      Mono # 1.3 (*)     Eos # 0.4      Baso # 0.02      nRBC 0      Gran % 71.0      Lymph % 11.6 (*)     Mono % 13.3      Eosinophil % 3.7      Basophil % 0.2      Differential Method Automated     COMPREHENSIVE METABOLIC PANEL - Abnormal    Sodium 134 (*)     Potassium 5.0      Chloride 94 (*)     CO2 29      Glucose 92      BUN 22 (*)     Creatinine 10.1 (*)     Calcium 9.9      Total Protein 8.7 (*)     Albumin 3.9      Total Bilirubin 0.5      Alkaline Phosphatase 92      AST 25      ALT 27      eGFR 6.0 (*)     Anion Gap 11     ISTAT LACTATE    POC Lactate 0.90      Sample VENOUS     POCT LACTATE        ECG Results              EKG 12-lead (In process)        Collection Time Result Time QRS Duration OHS QTC Calculation    10/17/24 17:12:10 10/18/24 17:25:42 92 487                     In process by Interface, Lab In [facility] (10/18/24 17:25:49)                   Narrative:    Test Reason : A41.9,    Vent. Rate : 102 BPM     Atrial Rate : 102 BPM     P-R Int : 148 ms          QRS Dur : 092 ms      QT Int : 374 ms       P-R-T Axes : 029 -68 087 degrees     QTc Int : 487 ms    Sinus  tachycardia  Left axis deviation  Abnormal ECG  When compared with ECG of 17-SEP-2024 08:57,  Minimal criteria for Anterior infarct are no longer Present  T wave inversion no longer evident in Inferior leads  T wave inversion now evident in Lateral leads    Referred By: [email or web address]   SELF           Confirmed By:                       In process by Interface, Lab In [facility] (10/18/24 10:49:56)                   Narrative:    Test Reason : A41.9,    Vent. Rate : 102 BPM     Atrial Rate : 102 BPM     P-R Int : 148 ms          QRS Dur : 092 ms      QT Int : 374 ms       P-R-T Axes : 029 -68 087 degrees     QTc Int : 487 ms    Sinus tachycardia  Left axis deviation  Abnormal ECG  When compared with ECG of 17-SEP-2024 08:57,  Minimal criteria for Anterior infarct are no longer Present  T wave inversion no longer evident in Inferior leads  T wave inversion now evident in Lateral leads    Referred By: [email or web address]   SELF           Confirmed By:                                      EKG 12-lead (Final result)  Result time 10/18/24 10:14:14      Final result by Unknown User (10/18/24 10:14:14)                                      Imaging Results              CT Abdomen Pelvis With IV Contrast Routine Oral Contrast (Final result)  Result time 10/18/24 01:09:21      Final result by Jose Meier MD (10/18/24 01:09:21)                   Impression:      Postoperative change of prior partial colon resection with colostomy in the left paramidline abdomen.  Redemonstration of nonspecific moderate localized mesenteric fat stranding and ill-defined fluid within the lower anterior abdomen and pelvis as detailed above.  This may reflect evolving nonspecific peritoneal infectious or inflammatory postsurgical process.    Postoperative change of the left inguinal canal containing nonspecific heterogeneous fluid/material.  There is a possible small abscess within the left inguinal canal extending to the skin surface as detailed above.   Correlation with physical examination advised.    Midline surgical incision with decreased volume of ill-defined fluid.  Additional interval decreased size of right anterior abdominal fluid collection.    Additional findings as above.      Electronically signed by: Jose Meier MD  Date:    10/18/2024  Time:    01:09               Narrative:    EXAMINATION:  CT ABDOMEN PELVIS WITH IV CONTRAST    CLINICAL HISTORY:  Abdominal pain, post-op;    TECHNIQUE:  Low dose axial images, sagittal and coronal reformations were obtained from the lung bases to the pubic symphysis following the IV administration of 100 cc of Visipaque 320 .  Oral contrast was not given.    COMPARISON:  CT examinations 10/02/2024, 09/25/2024    FINDINGS:  The lung bases are unremarkable.  There is no pleural fluid present.  The visualized portions of the heart appear normal.    The liver is normal in size and attenuation with no focal hepatic abnormality.  The gallbladder shows no evidence of stones or pericholecystic fluid.  There is no intra-or extrahepatic biliary ductal dilatation.    The stomach, spleen, pancreas, and adrenal glands are unremarkable.    The native kidneys are atrophic.  There is a stable left renal hypodensity.  No significant hydronephrosis.  The urinary bladder is unremarkable. The prostate demonstrates no significant abnormality.    The abdominal aorta is normal in course and caliber with mild atherosclerotic calcification along its course.    There is postoperative change of prior partial colon resection with left abdominal colostomy.  There is no evidence of small-bowel obstruction.  There is continued nonspecific mesenteric fat stranding and ill-defined fluid within the anterior lower abdomen and pelvis, similar to prior examination.  There is a small volume of ill-defined fluid centrally within this region measuring approximately 4.4 x 4.8 x 5.1 cm.  Previously demonstrated fluid collection deep to the anterior  abdominal wall appears significantly improved/resolved from prior study.  There is scattered retained stool throughout the colon.  There is previously demonstrated postoperative change of the left inguinal canal.  There is nonspecific heterogeneous material/fluid within the left inguinal canal, similar to prior examination.  There is possible peripherally enhancing fluid collection measuring 1.4 x 2.5 cm within the anterior aspect of the inguinal canal possibly communicating with the skin surface (for example axial series 4, image 240).  This could reflect a small abscess.  Correlation with physical examination advised.  There are scattered mildly prominent left inguinal lymph nodes, likely reactive in etiology.  There is a midline incision containing a small volume of ill-defined fluid, improved from prior examination.  Previously identified fluid collection within the right lateral anterior abdominal wall subcutaneous soft tissues appears decreased in size from prior study.  There are scattered nodular soft tissue densities within the anterior abdominal wall which may relate previous injection sites.  No large volume of free intraperitoneal air or portal venous gas identified.  There are scattered shotty mesenteric and retroperitoneal lymph nodes.  Minimal residual subcutaneous emphysema noted associated with the left anterior abdominal wall.    Osseous structures demonstrate degenerative change.                                       X-Ray Chest AP Portable (Final result)  Result time 10/17/24 17:29:10      Final result by Jono Donahue MD (10/17/24 17:29:10)                   Impression:      No acute cardiopulmonary process.      Electronically signed by: Jono Donahue  Date:    10/17/2024  Time:    17:29               Narrative:    EXAMINATION:  XR CHEST AP PORTABLE    CLINICAL HISTORY:  Sepsis;    TECHNIQUE:  Single frontal view of the chest was performed.    COMPARISON:  Radiographs  09/25/2024.    FINDINGS:  Cardiomediastinal silhouette is within normal limits.  Lungs are well expanded and clear.  No consolidation, pneumothorax, or pleural effusion.  No acute bony changes.                                       Medications   HYDROmorphone injection 1 mg (has no administration in time range)   sodium chloride 0.9% flush 3 mL (has no administration in time range)   melatonin tablet 6 mg (has no administration in time range)   ondansetron injection 4 mg (has no administration in time range)   senna-docusate 8.6-50 mg per tablet 2 tablet (2 tablets Oral Given 10/18/24 2157)   acetaminophen tablet 650 mg (has no administration in time range)   aluminum-magnesium hydroxide-simethicone 200-200-20 mg/5 mL suspension 30 mL (has no administration in time range)   acetaminophen tablet 650 mg (has no administration in time range)   naloxone 0.4 mg/mL injection 0.02 mg (has no administration in time range)   potassium bicarbonate disintegrating tablet 50 mEq (has no administration in time range)   potassium bicarbonate disintegrating tablet 35 mEq (has no administration in time range)   potassium bicarbonate disintegrating tablet 60 mEq (has no administration in time range)   magnesium oxide tablet 800 mg (has no administration in time range)   magnesium oxide tablet 800 mg (has no administration in time range)   potassium, sodium phosphates 280-160-250 mg packet 2 packet (has no administration in time range)   potassium, sodium phosphates 280-160-250 mg packet 2 packet (has no administration in time range)   potassium, sodium phosphates 280-160-250 mg packet 2 packet (has no administration in time range)   glucose chewable tablet 16 g (has no administration in time range)   glucose chewable tablet 24 g (has no administration in time range)   dextrose 50% injection 12.5 g (has no administration in time range)   dextrose 50% injection 25 g (has no administration in time range)   glucagon (human recombinant)  injection 1 mg (has no administration in time range)   diazePAM injection 5 mg (has no administration in time range)   HYDROmorphone injection 1 mg (1 mg Intravenous Not Given 10/18/24 0815)   oxyCODONE immediate release tablet Tab 20 mg (has no administration in time range)   famotidine (PF) injection 20 mg (20 mg Intravenous Given 10/18/24 0846)   calcitRIOL capsule 0.25 mcg (0.25 mcg Oral Given 10/18/24 0846)   calcium carbonate 200 mg calcium (500 mg) chewable tablet 1,000 mg (1,000 mg Oral Given 10/18/24 2156)   cloNIDine tablet 0.3 mg (has no administration in time range)   gabapentin capsule 100 mg (100 mg Oral Not Given 10/18/24 2100)   hydrALAZINE tablet 100 mg (100 mg Oral Given by Other 10/18/24 2156)   isosorbide mononitrate 24 hr tablet 30 mg (30 mg Oral Given 10/18/24 0846)   metoprolol succinate (TOPROL-XL) 24 hr tablet 150 mg (150 mg Oral Given 10/18/24 0846)   losartan tablet 100 mg (100 mg Oral Given 10/18/24 0846)   oxyCODONE 12 hr tablet 20 mg (20 mg Oral Given 10/18/24 2157)   0.9%  NaCl infusion (has no administration in time range)   0.9%  NaCl infusion (has no administration in time range)   sodium chloride 0.9% bolus 250 mL 250 mL (has no administration in time range)   heparin (porcine) injection 5,000 Units (has no administration in time range)   sevelamer carbonate tablet 2,400 mg (2,400 mg Oral Given 10/18/24 1655)   sevelamer carbonate tablet 1,600 mg (has no administration in time range)   piperacillin-tazobactam 4.5 g in dextrose 5 % 100 mL IVPB (ready to mix) (0 g Intravenous Stopped 10/17/24 2006)   iodixanoL (VISIPAQUE 320) injection 100 mL (100 mLs Intravenous Given 10/18/24 0040)   diazePAM injection 10 mg (10 mg Intravenous Given 10/18/24 0532)   diphenhydrAMINE injection 25 mg (25 mg Intravenous Given 10/18/24 0528)     Medical Decision Making  44-year-old man with an unfortunate history of uninfected inguinal hernia mesh with gangrene resulting in complicated wound with wound  VAC placement and colostomy.  He also has a history of end-stage renal disease on hemodialysis and presents to the emergency department today for multiple issues.  He had a culture obtained from his left groin wound where the wound VAC is in place on 10/15 that came back positive today for Gram-negative rods.  His right arm MID line was clogged and was forcefully being flushed according to the patient and left him with right shoulder and arm pain subsequently.  He reports continued left groin pain since his surgery but does not feel that it has changed much.  He says it waxes and wanes depending on if they are giving him pain medicine.  Differential diagnosis includes sepsis, wound infection, intra-abdominal infection.   Patient afebrile with a normal white blood cell count of 9.9.  Blood pressure in the low 100s over 60s.  Hemoglobin is 9.1 with white blood cell count of 9.9.  Potassium is normal at 5.0, lactic acid 0.90.  Currently does not meet septic criteria.  Patient was given empiric antibiotics with Zosyn.  Blood cultures were also obtained.  Chest x-ray interpreted by myself shows no pneumonia or acute abnormalities.  CT abdomen pelvis showed:    Postoperative change of prior partial colon resection with colostomy in the left paramidline abdomen.  Redemonstration of nonspecific moderate localized mesenteric fat stranding and ill-defined fluid within the lower anterior abdomen and pelvis as detailed above.  This may reflect evolving nonspecific peritoneal infectious or inflammatory postsurgical process.    Postoperative change of the left inguinal canal containing nonspecific heterogeneous fluid/material.  There is a possible small abscess within the left inguinal canal extending to the skin surface as detailed above.  Correlation with physical examination advised.    Midline surgical incision with decreased volume of ill-defined fluid.  Additional interval decreased size of right anterior abdominal fluid  collection.    Discussed Hospital Medicine and Nephrology.  Will admit patient on IV antibiotics and General surgery consultation for re-evaluation as well as monitor his potassium levels.    Amount and/or Complexity of Data Reviewed  Radiology: ordered.    Risk  Prescription drug management.               ED Course as of 10/18/24 2328   u Oct 17, 2024   1802 EKG interpreted by myself as sinus tachycardia 102 beats per minute with left axis deviation.  Normal intervals and ST segments.  No peaked T-waves. [AS]   2152 Discussed with Nephrology, Dr. Madrid.  He is concerned because the patient has had a gradual decline over the last few days with soft blood pressures, not eating much but having recurrent difficult to controlled hyperkalemia.  He states he is dialyzed him the past 3 days in a row.  He is worried he may be becoming septic.  He recommends obtaining a CT of the abdomen pelvis for this lower abdominal/groin pain. [AS]      ED Course User Index  [AS] Ganesh Owens MD                           Clinical Impression:  Final diagnoses:  [A41.9] Sepsis  [T81.49XA] Post-operative wound abscess (Primary)          ED Disposition Condition    Observation                 Ganesh Owens MD  10/18/24 232

## 2024-10-18 PROBLEM — T81.49XA POST-OPERATIVE WOUND ABSCESS: Status: ACTIVE | Noted: 2024-10-18

## 2024-10-18 PROBLEM — Z99.2 ESRD ON HEMODIALYSIS: Status: ACTIVE | Noted: 2024-10-18

## 2024-10-18 PROBLEM — N18.6 ESRD ON HEMODIALYSIS: Status: ACTIVE | Noted: 2024-10-18

## 2024-10-18 PROBLEM — G89.18 POST-OP PAIN: Status: ACTIVE | Noted: 2024-10-18

## 2024-10-18 LAB
OHS QRS DURATION: 92 MS
OHS QTC CALCULATION: 487 MS
POCT GLUCOSE: 117 MG/DL (ref 70–110)

## 2024-10-18 PROCEDURE — 25500020 PHARM REV CODE 255: Performed by: EMERGENCY MEDICINE

## 2024-10-18 PROCEDURE — 96375 TX/PRO/DX INJ NEW DRUG ADDON: CPT

## 2024-10-18 PROCEDURE — 25000003 PHARM REV CODE 250: Performed by: STUDENT IN AN ORGANIZED HEALTH CARE EDUCATION/TRAINING PROGRAM

## 2024-10-18 PROCEDURE — G0378 HOSPITAL OBSERVATION PER HR: HCPCS

## 2024-10-18 PROCEDURE — 63600175 PHARM REV CODE 636 W HCPCS: Performed by: INTERNAL MEDICINE

## 2024-10-18 PROCEDURE — 25000003 PHARM REV CODE 250: Performed by: INTERNAL MEDICINE

## 2024-10-18 RX ORDER — IBUPROFEN 200 MG
24 TABLET ORAL
Status: DISCONTINUED | OUTPATIENT
Start: 2024-10-18 | End: 2024-10-22 | Stop reason: HOSPADM

## 2024-10-18 RX ORDER — METOPROLOL SUCCINATE 50 MG/1
150 TABLET, EXTENDED RELEASE ORAL DAILY
Status: DISCONTINUED | OUTPATIENT
Start: 2024-10-18 | End: 2024-10-22 | Stop reason: HOSPADM

## 2024-10-18 RX ORDER — SODIUM CHLORIDE 9 MG/ML
INJECTION, SOLUTION INTRAVENOUS
Status: DISCONTINUED | OUTPATIENT
Start: 2024-10-18 | End: 2024-10-22 | Stop reason: HOSPADM

## 2024-10-18 RX ORDER — AMOXICILLIN 250 MG
2 CAPSULE ORAL 2 TIMES DAILY
Status: DISCONTINUED | OUTPATIENT
Start: 2024-10-18 | End: 2024-10-22 | Stop reason: HOSPADM

## 2024-10-18 RX ORDER — LANOLIN ALCOHOL/MO/W.PET/CERES
800 CREAM (GRAM) TOPICAL
Status: DISCONTINUED | OUTPATIENT
Start: 2024-10-18 | End: 2024-10-22 | Stop reason: HOSPADM

## 2024-10-18 RX ORDER — DIAZEPAM 10 MG/2ML
10 INJECTION INTRAMUSCULAR ONCE
Status: COMPLETED | OUTPATIENT
Start: 2024-10-18 | End: 2024-10-18

## 2024-10-18 RX ORDER — HYDROMORPHONE HYDROCHLORIDE 1 MG/ML
1 INJECTION, SOLUTION INTRAMUSCULAR; INTRAVENOUS; SUBCUTANEOUS EVERY 6 HOURS PRN
Status: DISCONTINUED | OUTPATIENT
Start: 2024-10-18 | End: 2024-10-22 | Stop reason: HOSPADM

## 2024-10-18 RX ORDER — SEVELAMER CARBONATE 800 MG/1
1600 TABLET, FILM COATED ORAL
Status: DISCONTINUED | OUTPATIENT
Start: 2024-10-18 | End: 2024-10-18

## 2024-10-18 RX ORDER — GLUCAGON 1 MG
1 KIT INJECTION
Status: DISCONTINUED | OUTPATIENT
Start: 2024-10-18 | End: 2024-10-22 | Stop reason: HOSPADM

## 2024-10-18 RX ORDER — SODIUM,POTASSIUM PHOSPHATES 280-250MG
2 POWDER IN PACKET (EA) ORAL
Status: DISCONTINUED | OUTPATIENT
Start: 2024-10-18 | End: 2024-10-22 | Stop reason: HOSPADM

## 2024-10-18 RX ORDER — IODIXANOL 320 MG/ML
100 INJECTION, SOLUTION INTRAVASCULAR
Status: COMPLETED | OUTPATIENT
Start: 2024-10-18 | End: 2024-10-18

## 2024-10-18 RX ORDER — CLONIDINE HYDROCHLORIDE 0.1 MG/1
0.3 TABLET ORAL 3 TIMES DAILY PRN
Status: DISCONTINUED | OUTPATIENT
Start: 2024-10-18 | End: 2024-10-22 | Stop reason: HOSPADM

## 2024-10-18 RX ORDER — DIAZEPAM 10 MG/2ML
5 INJECTION INTRAMUSCULAR EVERY 6 HOURS PRN
Status: DISCONTINUED | OUTPATIENT
Start: 2024-10-18 | End: 2024-10-22 | Stop reason: HOSPADM

## 2024-10-18 RX ORDER — CALCITRIOL 0.25 UG/1
0.25 CAPSULE ORAL DAILY
Status: DISCONTINUED | OUTPATIENT
Start: 2024-10-18 | End: 2024-10-22 | Stop reason: HOSPADM

## 2024-10-18 RX ORDER — GABAPENTIN 100 MG/1
100 CAPSULE ORAL 3 TIMES DAILY
Status: DISCONTINUED | OUTPATIENT
Start: 2024-10-18 | End: 2024-10-22 | Stop reason: HOSPADM

## 2024-10-18 RX ORDER — ACETAMINOPHEN 325 MG/1
650 TABLET ORAL EVERY 4 HOURS PRN
Status: DISCONTINUED | OUTPATIENT
Start: 2024-10-18 | End: 2024-10-22 | Stop reason: HOSPADM

## 2024-10-18 RX ORDER — SEVELAMER CARBONATE 800 MG/1
2400 TABLET, FILM COATED ORAL
Status: DISCONTINUED | OUTPATIENT
Start: 2024-10-18 | End: 2024-10-22 | Stop reason: HOSPADM

## 2024-10-18 RX ORDER — ACETAMINOPHEN 325 MG/1
650 TABLET ORAL EVERY 8 HOURS PRN
Status: DISCONTINUED | OUTPATIENT
Start: 2024-10-18 | End: 2024-10-22 | Stop reason: HOSPADM

## 2024-10-18 RX ORDER — SEVELAMER CARBONATE 800 MG/1
1600 TABLET, FILM COATED ORAL
Status: DISCONTINUED | OUTPATIENT
Start: 2024-10-18 | End: 2024-10-22 | Stop reason: HOSPADM

## 2024-10-18 RX ORDER — HEPARIN SODIUM 5000 [USP'U]/ML
5000 INJECTION, SOLUTION INTRAVENOUS; SUBCUTANEOUS
Status: DISCONTINUED | OUTPATIENT
Start: 2024-10-18 | End: 2024-10-22 | Stop reason: HOSPADM

## 2024-10-18 RX ORDER — NALOXONE HCL 0.4 MG/ML
0.02 VIAL (ML) INJECTION
Status: DISCONTINUED | OUTPATIENT
Start: 2024-10-18 | End: 2024-10-22 | Stop reason: HOSPADM

## 2024-10-18 RX ORDER — TALC
6 POWDER (GRAM) TOPICAL NIGHTLY PRN
Status: DISCONTINUED | OUTPATIENT
Start: 2024-10-18 | End: 2024-10-22 | Stop reason: HOSPADM

## 2024-10-18 RX ORDER — FAMOTIDINE 10 MG/ML
20 INJECTION INTRAVENOUS DAILY
Status: DISCONTINUED | OUTPATIENT
Start: 2024-10-18 | End: 2024-10-19

## 2024-10-18 RX ORDER — OXYCODONE HYDROCHLORIDE 10 MG/1
20 TABLET ORAL EVERY 6 HOURS PRN
Status: DISCONTINUED | OUTPATIENT
Start: 2024-10-18 | End: 2024-10-22 | Stop reason: HOSPADM

## 2024-10-18 RX ORDER — IBUPROFEN 200 MG
16 TABLET ORAL
Status: DISCONTINUED | OUTPATIENT
Start: 2024-10-18 | End: 2024-10-22 | Stop reason: HOSPADM

## 2024-10-18 RX ORDER — CALCIUM CARBONATE 200(500)MG
1000 TABLET,CHEWABLE ORAL 3 TIMES DAILY
Status: DISCONTINUED | OUTPATIENT
Start: 2024-10-18 | End: 2024-10-22 | Stop reason: HOSPADM

## 2024-10-18 RX ORDER — ISOSORBIDE MONONITRATE 30 MG/1
30 TABLET, EXTENDED RELEASE ORAL DAILY
Status: DISCONTINUED | OUTPATIENT
Start: 2024-10-18 | End: 2024-10-22 | Stop reason: HOSPADM

## 2024-10-18 RX ORDER — DIPHENHYDRAMINE HYDROCHLORIDE 50 MG/ML
25 INJECTION INTRAMUSCULAR; INTRAVENOUS ONCE
Status: COMPLETED | OUTPATIENT
Start: 2024-10-18 | End: 2024-10-18

## 2024-10-18 RX ORDER — OXYCODONE HCL 10 MG/1
20 TABLET, FILM COATED, EXTENDED RELEASE ORAL EVERY 12 HOURS
Status: DISCONTINUED | OUTPATIENT
Start: 2024-10-18 | End: 2024-10-22 | Stop reason: HOSPADM

## 2024-10-18 RX ORDER — HYDRALAZINE HYDROCHLORIDE 25 MG/1
100 TABLET, FILM COATED ORAL EVERY 8 HOURS
Status: DISCONTINUED | OUTPATIENT
Start: 2024-10-18 | End: 2024-10-22 | Stop reason: HOSPADM

## 2024-10-18 RX ORDER — SODIUM CHLORIDE 0.9 % (FLUSH) 0.9 %
3 SYRINGE (ML) INJECTION EVERY 12 HOURS PRN
Status: DISCONTINUED | OUTPATIENT
Start: 2024-10-18 | End: 2024-10-22 | Stop reason: HOSPADM

## 2024-10-18 RX ORDER — SODIUM CHLORIDE 9 MG/ML
INJECTION, SOLUTION INTRAVENOUS ONCE
Status: DISCONTINUED | OUTPATIENT
Start: 2024-10-18 | End: 2024-10-22 | Stop reason: HOSPADM

## 2024-10-18 RX ORDER — FAMOTIDINE 10 MG/ML
20 INJECTION INTRAVENOUS 2 TIMES DAILY
Status: DISCONTINUED | OUTPATIENT
Start: 2024-10-18 | End: 2024-10-18

## 2024-10-18 RX ORDER — LOSARTAN POTASSIUM 50 MG/1
100 TABLET ORAL DAILY
Status: DISCONTINUED | OUTPATIENT
Start: 2024-10-18 | End: 2024-10-22 | Stop reason: HOSPADM

## 2024-10-18 RX ORDER — ALUMINUM HYDROXIDE, MAGNESIUM HYDROXIDE, AND SIMETHICONE 1200; 120; 1200 MG/30ML; MG/30ML; MG/30ML
30 SUSPENSION ORAL 4 TIMES DAILY PRN
Status: DISCONTINUED | OUTPATIENT
Start: 2024-10-18 | End: 2024-10-22 | Stop reason: HOSPADM

## 2024-10-18 RX ORDER — ONDANSETRON HYDROCHLORIDE 2 MG/ML
4 INJECTION, SOLUTION INTRAVENOUS EVERY 6 HOURS PRN
Status: DISCONTINUED | OUTPATIENT
Start: 2024-10-18 | End: 2024-10-22 | Stop reason: HOSPADM

## 2024-10-18 RX ORDER — HYDROMORPHONE HYDROCHLORIDE 1 MG/ML
1 INJECTION, SOLUTION INTRAMUSCULAR; INTRAVENOUS; SUBCUTANEOUS ONCE
Status: DISCONTINUED | OUTPATIENT
Start: 2024-10-18 | End: 2024-10-22 | Stop reason: HOSPADM

## 2024-10-18 RX ADMIN — CALCIUM CARBONATE (ANTACID) CHEW TAB 500 MG 1000 MG: 500 CHEW TAB at 08:10

## 2024-10-18 RX ADMIN — OXYCODONE HYDROCHLORIDE 20 MG: 10 TABLET, FILM COATED, EXTENDED RELEASE ORAL at 09:10

## 2024-10-18 RX ADMIN — HYDRALAZINE HYDROCHLORIDE 100 MG: 25 TABLET ORAL at 02:10

## 2024-10-18 RX ADMIN — CALCITRIOL CAPSULES 0.25 MCG 0.25 MCG: 0.25 CAPSULE ORAL at 08:10

## 2024-10-18 RX ADMIN — CALCIUM CARBONATE (ANTACID) CHEW TAB 500 MG 1000 MG: 500 CHEW TAB at 09:10

## 2024-10-18 RX ADMIN — SEVELAMER CARBONATE 1600 MG: 800 TABLET, FILM COATED ORAL at 12:10

## 2024-10-18 RX ADMIN — HYDRALAZINE HYDROCHLORIDE 100 MG: 25 TABLET ORAL at 09:10

## 2024-10-18 RX ADMIN — IODIXANOL 100 ML: 320 INJECTION, SOLUTION INTRAVASCULAR at 12:10

## 2024-10-18 RX ADMIN — OXYCODONE HYDROCHLORIDE 20 MG: 10 TABLET, FILM COATED, EXTENDED RELEASE ORAL at 08:10

## 2024-10-18 RX ADMIN — CALCIUM CARBONATE (ANTACID) CHEW TAB 500 MG 1000 MG: 500 CHEW TAB at 02:10

## 2024-10-18 RX ADMIN — DIPHENHYDRAMINE HYDROCHLORIDE 25 MG: 50 INJECTION INTRAMUSCULAR; INTRAVENOUS at 05:10

## 2024-10-18 RX ADMIN — SEVELAMER CARBONATE 2400 MG: 800 TABLET, FILM COATED ORAL at 04:10

## 2024-10-18 RX ADMIN — SENNOSIDES AND DOCUSATE SODIUM 2 TABLET: 8.6; 5 TABLET ORAL at 09:10

## 2024-10-18 RX ADMIN — ISOSORBIDE MONONITRATE 30 MG: 30 TABLET, EXTENDED RELEASE ORAL at 08:10

## 2024-10-18 RX ADMIN — LOSARTAN POTASSIUM 100 MG: 50 TABLET, FILM COATED ORAL at 08:10

## 2024-10-18 RX ADMIN — DIAZEPAM 10 MG: 10 INJECTION, SOLUTION INTRAMUSCULAR; INTRAVENOUS at 05:10

## 2024-10-18 RX ADMIN — HYDROMORPHONE HYDROCHLORIDE 1 MG: 1 INJECTION, SOLUTION INTRAMUSCULAR; INTRAVENOUS; SUBCUTANEOUS at 11:10

## 2024-10-18 RX ADMIN — SENNOSIDES AND DOCUSATE SODIUM 2 TABLET: 8.6; 5 TABLET ORAL at 08:10

## 2024-10-18 RX ADMIN — FAMOTIDINE 20 MG: 10 INJECTION, SOLUTION INTRAVENOUS at 08:10

## 2024-10-18 RX ADMIN — SEVELAMER CARBONATE 1600 MG: 800 TABLET, FILM COATED ORAL at 08:10

## 2024-10-18 RX ADMIN — METOPROLOL SUCCINATE 150 MG: 50 TABLET, FILM COATED, EXTENDED RELEASE ORAL at 08:10

## 2024-10-18 NOTE — PLAN OF CARE
[facility].  Consult placed for woundcare for recommendations since patient was d/c from rehab and will be going home with HH.

## 2024-10-18 NOTE — CONSULTS
[facility]  General Surgery  Consult Note    Patient Name: João Ham  MRN: [id number]  Code Status: Full Code  Admission Date: 10/17/2024  Hospital Length of Stay: 0 days  Attending Physician: Pierce Wisdom DO  Primary Care Provider: Dawson Granado MD    Patient information was obtained from patient and ER records.     Consults  Subjective:     Principal Problem: Post-operative wound abscess    History of Present Illness: 45 yo M whom I am familiar with.  Pt presented 9/17 with phelgmon/abscess in the L groin and loop of colon within hernia.  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  Attempted robotic resection and repair.  Unfortunately he had a postoperative leak requiring exploratory laparotomy and end colostomy.  He had been treated at the LTAC facility.  He was having some abdominal pain.  Cultures were taken of the left groin which does drained some mild purulent fluid and did demonstrate Gram-negative rods.  He had a denies fevers.  Notes the wound has been healing.  Of note he also notes that his midline incision at the lower portion has dehisced.  His ostomy has been functioning well.  CT scan was done yesterday with a small fluid collection noted in the left groin.  There was also a chronic fluid collection between the in the mesentery.  Loops of bowel in the lower abdomen.  Per my review these do not appear consistent with obvious abscess.  Surgery was asked to see patient.    Current Facility-Administered Medications on File Prior to Encounter   Medication    [DISCONTINUED] hydrALAZINE tablet    [DISCONTINUED] HYDROmorphone tablet    [DISCONTINUED] LIDOcaine-prilocaine cream    [DISCONTINUED] senna-docusate 8.6-50 mg per tablet    [DISCONTINUED] sodium zirconium cyclosilicate packet     Current Outpatient Medications on File Prior to Encounter   Medication Sig    acetaminophen (TYLENOL) 325 MG tablet Take 2 tablets (650  mg total) by mouth every 4 (four) hours as needed.    ALPRAZolam (XANAX) 0.25 MG tablet Take 1 tablet (0.25 mg total) by mouth nightly as needed for Insomnia.    aluminum-magnesium hydroxide-simethicone (MAALOX) 200-200-20 mg/5 mL Susp Take 30 mLs by mouth 4 (four) times daily as needed.    calcitRIOL (ROCALTROL) 0.25 MCG Cap Take 1 capsule (0.25 mcg total) by mouth once daily.    calcium carbonate (TUMS) 200 mg calcium (500 mg) chewable tablet Take 2 tablets (1,000 mg total) by mouth 3 (three) times daily.    cloNIDine (CATAPRES) 0.3 MG tablet Take 1 tablet (0.3 mg total) by mouth 3 (three) times daily as needed (SBP > 150).    epoetin mily-epbx (RETACRIT) 20,000 unit/mL injection Inject 0.67 mLs (13,400 Units total) into the skin every Mon, Wed, Fri.    gabapentin (NEURONTIN) 100 MG capsule Take 1 capsule (100 mg total) by mouth 3 (three) times daily.    heparin sodium,porcine (HEPARIN, PORCINE,) 1,000 unit/mL injection Inject 4 mLs (4,000 Units total) into the vein as needed (line care after dialysis).    heparin sodium,porcine (HEPARIN, PORCINE,) 5,000 unit/mL injection Inject 1 mL (5,000 Units total) into the skin every 8 (eight) hours.    hydrALAZINE (APRESOLINE) 100 MG tablet Take 1 tablet (100 mg total) by mouth every 8 (eight) hours. Hold for SBP < 120 and before dialysis    HYDROmorphone (DILAUDID) 4 MG tablet Take 1 tablet (4 mg total) by mouth every 4 (four) hours as needed for Pain.    insulin aspart U-100 (NOVOLOG) 100 unit/mL (3 mL) InPn pen Inject 0-5 Units into the skin before meals and at bedtime as needed (Hyperglycemia).    isosorbide mononitrate (IMDUR) 30 MG 24 hr tablet Take 1 tablet (30 mg total) by mouth once daily.    ketoconazole (NIZORAL) 2 % shampoo Apply topically every other day.    LIDOcaine-prilocaine (EMLA) cream Apply topically as needed (pre dialysis).    melatonin (MELATIN) 3 mg tablet Take 2 tablets (6 mg total) by mouth nightly as needed for Insomnia.    metoprolol succinate  (TOPROL-XL) 50 MG 24 hr tablet Take 150 mg by mouth once daily.    miconazole (MICOTIN) 2 % cream Apply topically 2 (two) times daily.    olmesartan (BENICAR) 40 MG tablet Take 1 tablet by mouth once daily (Patient taking differently: Take 40 mg by mouth once daily.)    ondansetron (ZOFRAN-ODT) 4 MG TbDL Take 1 tablet (4 mg total) by mouth every 6 (six) hours as needed (nausea).    oxyCODONE (OXYCONTIN) 10 mg 12 hr tablet Take 1 tablet (10 mg total) by mouth every 12 (twelve) hours.    oxymetazoline (AFRIN) 0.05 % nasal spray 1 spray by Nasal route 2 (two) times daily.    sevelamer carbonate (RENVELA) 800 mg Tab Take 2 tablets (1,600 mg total) by mouth 3 (three) times daily with meals.    simethicone (MYLICON) 80 MG chewable tablet Take 1 tablet (80 mg total) by mouth 3 (three) times daily as needed for Flatulence.       Review of patient's allergies indicates:   Allergen Reactions    Mushroom Swelling     Tongue swells    Tongue swells       Tongue swells       Past Medical History:   Diagnosis Date    Allergy     Anemia     Anxiety     CHF (congestive heart failure)     Depression     High blood pressure with chronic kidney disease, stage 5 chronic kidney disease or end stage renal disease     Hypertension      Past Surgical History:   Procedure Laterality Date    ABSCESS DRAINAGE Left 9/17/2024    Procedure: DRAINAGE, ABSCESS, GROIN;  Surgeon: Galileo Connors MD;  Location: [facility] OR;  Service: General;  Laterality: Left;    COLON RESECTION  9/25/2024    Procedure: COLON RESECTION;  Surgeon: Galileo Connors MD;  Location: [facility] OR;  Service: General;;    FISTULOGRAM Bilateral 4/30/2024    Procedure: Fistulogram;  Surgeon: Khoobehi, Ali, MD;  Location: [facility] CATH/EP LAB;  Service: Vascular;  Laterality: Bilateral;    FISTULOGRAM Left 9/24/2024    Procedure: Fistulogram;  Surgeon: Lorraine Salvador MD;  Location: [facility] CATH/EP LAB;  Service: General;  Laterality: Left;    HERNIA REPAIR Right     With mesh     INSERTION, CATHETER, TUNNELED N/A 6/22/2023    Procedure: Insertion,catheter,tunneled;  Surgeon: Everett Caicedo MD;  Location: [facility] OR;  Service: General;  Laterality: N/A;    LAPAROTOMY, EXPLORATORY N/A 9/25/2024    Procedure: LAPAROTOMY, EXPLORATORY;  Surgeon: Galileo Connors MD;  Location: [facility] OR;  Service: General;  Laterality: N/A;    MOBILIZATION OF SPLENIC FLEXURE  9/25/2024    Procedure: MOBILIZATION, SPLENIC FLEXURE;  Surgeon: Galileo Connors MD;  Location: [facility] OR;  Service: General;;    PERCUTANEOUS TRANSLUMINAL ANGIOPLASTY OF ARTERIOVENOUS FISTULA Left 4/30/2024    Procedure: PTA, AV FISTULA;  Surgeon: Khoobehi, Ali, MD;  Location: [facility] CATH/EP LAB;  Service: Vascular;  Laterality: Left;    PERCUTANEOUS TRANSLUMINAL ANGIOPLASTY OF ARTERIOVENOUS FISTULA Left 9/24/2024    Procedure: PTA, AV FISTULA;  Surgeon: Lorraine Salvador MD;  Location: [facility] CATH/EP LAB;  Service: General;  Laterality: Left;    PHLEBOGRAPHY N/A 7/19/2024    Procedure: Venogram;  Surgeon: Khoobehi, Ali, MD;  Location: [facility] CATH/EP LAB;  Service: Vascular;  Laterality: N/A;    REMOVAL, TUNNELED CATH Right 7/19/2024    Procedure: REMOVAL, TUNNELED CATH;  Surgeon: Khoobehi, Ali, MD;  Location: [facility] CATH/EP LAB;  Service: Vascular;  Laterality: Right;    ROBOT-ASSISTED LAPAROSCOPIC RESECTION OF SIGMOID COLON USING DA DELANO XI N/A 9/17/2024    Procedure: XI ROBOTIC SIGMOID RESECTION, WITH ANASTAMOSIS;  Surgeon: Galileo Connors MD;  Location: [facility];  Service: General;  Laterality: N/A;  possible open have instruments available     Family History    None       Tobacco Use    Smoking status: Never     Passive exposure: Never    Smokeless tobacco: Never   Substance and Sexual Activity    Alcohol use: Never    Drug use: Never    Sexual activity: Not Currently     Review of Systems   Constitutional:  Negative for activity change and appetite change.   Gastrointestinal:  Positive for abdominal pain. Negative for abdominal distention,  nausea and vomiting.   Skin:  Positive for wound. Negative for color change.        Open wound in the left groin was visualized.  Tissue itself appears to be granulating.  There is some mild purulent fluid coming from this.  In all the induration and swelling of the left groin is greatly improved compared to when I last saw him in late September.     Objective:     Vital Signs (Most Recent):  Temp: 98.2 °F (36.8 °C) (10/18/24 1100)  Pulse: 104 (10/18/24 1100)  Resp: 18 (10/18/24 1100)  BP: 93/62 (10/18/24 1100)  SpO2: 95 % (10/18/24 1100) Vital Signs (24h Range):  Temp:  [98.2 °F (36.8 °C)-98.5 °F (36.9 °C)] 98.2 °F (36.8 °C)  Pulse:  [] 104  Resp:  [10-20] 18  SpO2:  [95 %-99 %] 95 %  BP: ()/(57-88) 93/62     Weight: 120.5 kg (265 lb 10.5 oz)  Body mass index is 34.11 kg/m².     Physical Exam  Vitals reviewed.   Cardiovascular:      Rate and Rhythm: Normal rate.      Pulses: Normal pulses.   Pulmonary:      Effort: Pulmonary effort is normal.   Abdominal:      General: There is no distension.      Tenderness: There is no abdominal tenderness.      Hernia: No hernia is present.   Neurological:      Mental Status: He is alert.   Psychiatric:         Mood and Affect: Mood normal.            I have reviewed all pertinent lab results within the past 24 hours.  CBC:   Recent Labs   Lab 10/17/24  1923   WBC 9.91   RBC 2.95*   HGB 9.1*   HCT 30.0*      *   MCH 30.8   MCHC 30.3*     BMP:   Recent Labs   Lab 10/17/24  1923   GLU 92   *   K 5.0   CL 94*   CO2 29   BUN 22*   CREATININE 10.1*   CALCIUM 9.9       Significant Diagnostics:  Ct reviewed.  Fluid noted in the lower abdomen within the mesentery measuring 4 x 4 x 5.  I suspect this is likely residual seroma.  Left inguinal fluid collection measuring 1-1/2 x 2 cm.  Suspect that this does communicate with the skin in his draining appropriately.    Assessment/Plan:     No notes have been filed under this hospital service.  Service: General  Surgery  Fluid collection in the left groin was very small and appears to be draining from the skin.  Given chronic contamination from the mesh would highly suspect that any cultures in the left groin would demonstrate colonized Gram-negative.  Discussed with the patient and family that the midline wound appears okay there was a slight dehiscence of the superficial skin and tongue soft tissue.  I would not plan any further surgical debridement or drainage.  If intra-abdominal fluid collection wanted to be assessed would defer to Radiology.  Okay to advance diet.  Okay to discharge to LTAC facility when cleared by Medicine.  VTE Risk Mitigation (From admission, onward)           Ordered     Reason for No Pharmacological VTE Prophylaxis  Once        Question:  Reasons:  Answer:  Patient is Ambulatory    10/18/24 0455     IP VTE HIGH RISK PATIENT  Once         10/18/24 0455     Place sequential compression device  Until discontinued         10/18/24 0455                    Thank you for your consult. I will follow-up with patient. Please contact us if you have any additional questions.    Galileo Connors MD  General Surgery  [facility]

## 2024-10-18 NOTE — PLAN OF CARE
DEANGELO contacted [facility] to inquire if Pt is anticipated to return to [facility] upon discharge. Awaiting response.

## 2024-10-18 NOTE — H&P
[facility] - Emergency Dept  Hospital Medicine  History & Physical    Patient Name: João Ham  MRN: [id number]  Patient Class: OP- Observation  Admission Date: 10/17/2024  Attending Physician: Neftali Cohn MD  Primary Care Provider: Dawson Granado MD         Patient information was obtained from patient and ER records.     Subjective:     Principal Problem:Post-operative wound abscess    Chief Complaint:   Chief Complaint   Patient presents with    Wound Infection     + wound culture for s/p hernia repair     Hyperkalemia     Is currently being dialyzed daily due to potassium level         HPI:  44-year-old man with an unfortunate history of uninfected inguinal hernia mesh with gangrene resulting in complicated wound with wound VAC placement and colostomy.  He also has a history of end-stage renal disease on hemodialysis and presents to the emergency department today for multiple issues.  He had a culture obtained from his left groin wound where the wound VAC is in place on 10/15 that came back positive today for Gram-negative rods.  His right arm MID line was clogged and was forcefully being flushed according to the patient and left him with right shoulder and arm pain subsequently.  He wants the midline removed and another 1 placed.  He states his potassium has been high and he had to receive dialysis yesterday but then again today.  He believes this is due to the healthcare workers at the rehabilitation facility not administering his phosphorus binding medications prior to eating.       Non smoker  Non drinker    Currently living in rehab facility       DC summary from our service 10/6/2024    Patient is a 44 year old male with history of ESRD, was admitted with suspected inguinal hernia gangrene. General surgery was consulted and patient was taken to OR. Found to have colonic perforation due to mesh erosion with an abscess. Patient underwent sigmoid resection and drainage of the abscess,  was started on clindamycin, Vancomycin and cefepime. Nephrology was consulted for chronic dialysis. AVF was not functioning, General surgery consulted, trialysis catheter placed. PRBC transfused during dialysis for low Hb 6.6.  While on broad-spectrum coverage, patient is spiking fevers and worsening leukocytosis, id consulted, discontinued clindamycin, vancomycin and cefepime-added daptomycin, Diflucan and Zosyn.  Repeated CT abdomen and pelvis that showed residual abscess/phlegmon and contained perforation. Re-consulted surgery who mentioned likely post op changes. Fistulogram 9/24 by vascular surgery for declotting the AV fistula. Trialysis removed 9/25. Had worsening white count, fevers upto 103.5 and hypotension overnight 9/25.  Also had hemoglobin of 7, with worsening hypoxia up to 7 L (overnight desaturated to 70% with lethargy requiring BiPAP placement), after which nephrology recommended 1 unit PRBC transfusion.  Id, Nephrology, Urology, General surgery were updated about his worsening condition.We CT repeated that showed intraperitoneal free air and findings suggestive of necrotizing fasciitis, general surgery was consulted stat, patient was transferred to ICU.  The surgeon performed open laparotomy and noted fecal contamination of the left lower quadrant indicative of anastomotic disruption.  Colon was resected, and colostomy was created. Patient was placed on full liquid diet, was unable to tolerate and downgraded back to clear liquid diet.  Also complaining of excess mucus and cough.  Patient encouraged to use incentive spirometer as directed, and press pillow to abdomen when coughing.  Pulmonology signed off on 09/29 with recommendation to continue BiPAP nightly and with naps.  PT/OT recommended high-intensity therapy due to patient's deconditioning and discharge planning was started.  Pain regimen was adjusted.  Tachycardia and stagnant white blood cell count noted with low-grade temps-a CT abdomen  and pelvis was pursued on 10/02.  This was revealing for 2 new fluid collections.  These were discussed with general surgery and IR.  IR did do an aspirate of the collection they felt easily accessible, this was negative on Gram stain with cultures pending.  Did not feel either fluid collection with rim enhancing or indicative of an abscess.  General surgery agreed.  Leukocytosis improved.  Temperature is improved.  He had issues with ongoing pain control and long-acting pain regimen was added. Patient cleared for discharge from surgical standpoint with f/u in 1-2 weeks. BERNABE drain removed and surgical site CDI. Patient assessed on day of discharge and stable for discharge. Some overnight epistaxis reported but patient also reported that he was picking and blowing his nose a lot. Educated on nose bleeds and education included in discharge instructions. Patient also instructed to only use Afrin 1-2 times in a 24 hour period and not to exceed 3 days. This was also included in discharge instructions. Patient will continue abx thru 10/9 and then have midline removed. He is to f/u with ID in 3 weeks. He is also to f/u with urology in 2 weeks. Plan discussed with patient and he is agreeable and ready for discharge.               Impression: CT scan from [facility] in ER      Postoperative change of prior partial colon resection with colostomy in the left paramidline abdomen.  Redemonstration of nonspecific moderate localized mesenteric fat stranding and ill-defined fluid within the lower anterior abdomen and pelvis as detailed above.  This may reflect evolving nonspecific peritoneal infectious or inflammatory postsurgical process.     Postoperative change of the left inguinal canal containing nonspecific heterogeneous fluid/material.  There is a possible small abscess within the left inguinal canal extending to the skin surface as detailed above.  Correlation with physical examination advised.     Midline surgical incision  with decreased volume of ill-defined fluid.  Additional interval decreased size of right anterior abdominal fluid collection.        His WBC was normal   No left shift    Lactic acid normal     CMP normal for ESRD     Potassium is 5         Plan is to admit to our service to address the findings on CT scan , his pain and any HD access issues with nephrology and General Surgery        Past Medical History:   Diagnosis Date    Allergy     Anemia     Anxiety     CHF (congestive heart failure)     Depression     High blood pressure with chronic kidney disease, stage 5 chronic kidney disease or end stage renal disease     Hypertension        Past Surgical History:   Procedure Laterality Date    ABSCESS DRAINAGE Left 9/17/2024    Procedure: DRAINAGE, ABSCESS, GROIN;  Surgeon: Galileo Connors MD;  Location: [facility] OR;  Service: General;  Laterality: Left;    COLON RESECTION  9/25/2024    Procedure: COLON RESECTION;  Surgeon: Galileo Connors MD;  Location: [facility] OR;  Service: General;;    FISTULOGRAM Bilateral 4/30/2024    Procedure: Fistulogram;  Surgeon: Khoobehi, Ali, MD;  Location: [facility] CATH/EP LAB;  Service: Vascular;  Laterality: Bilateral;    FISTULOGRAM Left 9/24/2024    Procedure: Fistulogram;  Surgeon: Lorraine Salvador MD;  Location: [facility] CATH/EP LAB;  Service: General;  Laterality: Left;    HERNIA REPAIR Right     With mesh    INSERTION, CATHETER, TUNNELED N/A 6/22/2023    Procedure: Insertion,catheter,tunneled;  Surgeon: Everett Caicedo MD;  Location: [facility] OR;  Service: General;  Laterality: N/A;    LAPAROTOMY, EXPLORATORY N/A 9/25/2024    Procedure: LAPAROTOMY, EXPLORATORY;  Surgeon: Galileo Connors MD;  Location: [facility] OR;  Service: General;  Laterality: N/A;    MOBILIZATION OF SPLENIC FLEXURE  9/25/2024    Procedure: MOBILIZATION, SPLENIC FLEXURE;  Surgeon: Galileo Connors MD;  Location: [facility] OR;  Service: General;;    PERCUTANEOUS TRANSLUMINAL ANGIOPLASTY OF ARTERIOVENOUS FISTULA Left 4/30/2024     Procedure: PTA, AV FISTULA;  Surgeon: Khoobehi, Ali, MD;  Location: [facility] CATH/EP LAB;  Service: Vascular;  Laterality: Left;    PERCUTANEOUS TRANSLUMINAL ANGIOPLASTY OF ARTERIOVENOUS FISTULA Left 9/24/2024    Procedure: PTA, AV FISTULA;  Surgeon: Lorraine Salvador MD;  Location: [facility] CATH/EP LAB;  Service: General;  Laterality: Left;    PHLEBOGRAPHY N/A 7/19/2024    Procedure: Venogram;  Surgeon: Khoobehi, Ali, MD;  Location: [facility] CATH/EP LAB;  Service: Vascular;  Laterality: N/A;    REMOVAL, TUNNELED CATH Right 7/19/2024    Procedure: REMOVAL, TUNNELED CATH;  Surgeon: Khoobehi, Ali, MD;  Location: [facility] CATH/EP LAB;  Service: Vascular;  Laterality: Right;    ROBOT-ASSISTED LAPAROSCOPIC RESECTION OF SIGMOID COLON USING DA DELANO XI N/A 9/17/2024    Procedure: XI ROBOTIC SIGMOID RESECTION, WITH ANASTAMOSIS;  Surgeon: Galileo Connors MD;  Location: [facility] OR;  Service: General;  Laterality: N/A;  possible open have instruments available       Review of patient's allergies indicates:   Allergen Reactions    Mushroom Swelling     Tongue swells    Tongue swells       Tongue swells       Current Facility-Administered Medications on File Prior to Encounter   Medication    [DISCONTINUED] hydrALAZINE tablet    [DISCONTINUED] HYDROmorphone tablet    [DISCONTINUED] LIDOcaine-prilocaine cream    [DISCONTINUED] senna-docusate 8.6-50 mg per tablet    [DISCONTINUED] sodium zirconium cyclosilicate packet     Current Outpatient Medications on File Prior to Encounter   Medication Sig    acetaminophen (TYLENOL) 325 MG tablet Take 2 tablets (650 mg total) by mouth every 4 (four) hours as needed.    ALPRAZolam (XANAX) 0.25 MG tablet Take 1 tablet (0.25 mg total) by mouth nightly as needed for Insomnia.    aluminum-magnesium hydroxide-simethicone (MAALOX) 200-200-20 mg/5 mL Susp Take 30 mLs by mouth 4 (four) times daily as needed.    calcitRIOL (ROCALTROL) 0.25 MCG Cap Take 1 capsule (0.25 mcg total) by mouth once daily.    calcium carbonate  (TUMS) 200 mg calcium (500 mg) chewable tablet Take 2 tablets (1,000 mg total) by mouth 3 (three) times daily.    cloNIDine (CATAPRES) 0.3 MG tablet Take 1 tablet (0.3 mg total) by mouth 3 (three) times daily as needed (SBP > 150).    epoetin mily-epbx (RETACRIT) 20,000 unit/mL injection Inject 0.67 mLs (13,400 Units total) into the skin every Mon, Wed, Fri.    gabapentin (NEURONTIN) 100 MG capsule Take 1 capsule (100 mg total) by mouth 3 (three) times daily.    heparin sodium,porcine (HEPARIN, PORCINE,) 1,000 unit/mL injection Inject 4 mLs (4,000 Units total) into the vein as needed (line care after dialysis).    heparin sodium,porcine (HEPARIN, PORCINE,) 5,000 unit/mL injection Inject 1 mL (5,000 Units total) into the skin every 8 (eight) hours.    hydrALAZINE (APRESOLINE) 100 MG tablet Take 1 tablet (100 mg total) by mouth every 8 (eight) hours. Hold for SBP < 120 and before dialysis    HYDROmorphone (DILAUDID) 4 MG tablet Take 1 tablet (4 mg total) by mouth every 4 (four) hours as needed for Pain.    insulin aspart U-100 (NOVOLOG) 100 unit/mL (3 mL) InPn pen Inject 0-5 Units into the skin before meals and at bedtime as needed (Hyperglycemia).    isosorbide mononitrate (IMDUR) 30 MG 24 hr tablet Take 1 tablet (30 mg total) by mouth once daily.    ketoconazole (NIZORAL) 2 % shampoo Apply topically every other day.    LIDOcaine-prilocaine (EMLA) cream Apply topically as needed (pre dialysis).    melatonin (MELATIN) 3 mg tablet Take 2 tablets (6 mg total) by mouth nightly as needed for Insomnia.    metoprolol succinate (TOPROL-XL) 50 MG 24 hr tablet Take 150 mg by mouth once daily.    miconazole (MICOTIN) 2 % cream Apply topically 2 (two) times daily.    olmesartan (BENICAR) 40 MG tablet Take 1 tablet by mouth once daily    ondansetron (ZOFRAN-ODT) 4 MG TbDL Take 1 tablet (4 mg total) by mouth every 6 (six) hours as needed (nausea).    oxyCODONE (OXYCONTIN) 10 mg 12 hr tablet Take 1 tablet (10 mg total) by mouth  every 12 (twelve) hours.    oxymetazoline (AFRIN) 0.05 % nasal spray 1 spray by Nasal route 2 (two) times daily.    sevelamer carbonate (RENVELA) 800 mg Tab Take 2 tablets (1,600 mg total) by mouth 3 (three) times daily with meals.    simethicone (MYLICON) 80 MG chewable tablet Take 1 tablet (80 mg total) by mouth 3 (three) times daily as needed for Flatulence.     Family History    None       Tobacco Use    Smoking status: Never     Passive exposure: Never    Smokeless tobacco: Never   Substance and Sexual Activity    Alcohol use: Never    Drug use: Never    Sexual activity: Not Currently     Review of Systems   All other systems reviewed and are negative.    Objective:     Vital Signs (Most Recent):  Temp: 98.5 °F (36.9 °C) (10/17/24 1652)  Pulse: 101 (10/18/24 0200)  Resp: 19 (10/18/24 0200)  BP: 102/76 (10/18/24 0200)  SpO2: 95 % (10/18/24 0200) Vital Signs (24h Range):  Temp:  [98.5 °F (36.9 °C)] 98.5 °F (36.9 °C)  Pulse:  [] 101  Resp:  [10-19] 19  SpO2:  [95 %-99 %] 95 %  BP: ()/(57-88) 102/76     Weight: 121.1 kg (267 lb)  Body mass index is 34.28 kg/m².     Physical Exam  Vitals and nursing note reviewed.   Constitutional:       Appearance: Normal appearance.   HENT:      Head: Normocephalic.      Nose: Nose normal.      Mouth/Throat:      Mouth: Mucous membranes are moist.   Eyes:      Pupils: Pupils are equal, round, and reactive to light.   Cardiovascular:      Rate and Rhythm: Normal rate.      Pulses: Normal pulses.      Heart sounds: Normal heart sounds.   Pulmonary:      Effort: Pulmonary effort is normal.      Breath sounds: Normal breath sounds.   Abdominal:      General: Abdomen is flat. Bowel sounds are normal.      Palpations: Abdomen is soft.      Comments: Colostomy bag in place    Musculoskeletal:         General: Normal range of motion.      Cervical back: Normal range of motion.   Skin:     General: Skin is warm.      Capillary Refill: Capillary refill takes less than 2 seconds.  "  Neurological:      General: No focal deficit present.      Mental Status: He is alert and oriented to person, place, and time.   Psychiatric:         Mood and Affect: Mood normal.         Behavior: Behavior normal.              CRANIAL NERVES     CN III, IV, VI   Pupils are equal, round, and reactive to light.       Significant Labs: All pertinent labs within the past 24 hours have been reviewed.  CBC:   Recent Labs   Lab 10/17/24  1923   WBC 9.91   HGB 9.1*   HCT 30.0*        CMP:   Recent Labs   Lab 10/17/24  1923   *   K 5.0   CL 94*   CO2 29   GLU 92   BUN 22*   CREATININE 10.1*   CALCIUM 9.9   PROT 8.7*   ALBUMIN 3.9   BILITOT 0.5   ALKPHOS 92   AST 25   ALT 27   ANIONGAP 11     Cardiac Markers: No results for input(s): "CKMB", "MYOGLOBIN", "BNP", "TROPISTAT" in the last 48 hours.  Coagulation: No results for input(s): "PT", "INR", "APTT" in the last 48 hours.  Lactic Acid: No results for input(s): "LACTATE" in the last 48 hours.  Lipid Panel: No results for input(s): "CHOL", "HDL", "LDLCALC", "TRIG", "CHOLHDL" in the last 48 hours.  Magnesium: No results for input(s): "MG" in the last 48 hours.  Troponin: No results for input(s): "TROPONINI", "TROPONINIHS" in the last 48 hours.  TSH: No results for input(s): "TSH" in the last 4320 hours.  Urine Studies: No results for input(s): "COLORU", "APPEARANCEUA", "PHUR", "SPECGRAV", "PROTEINUA", "GLUCUA", "KETONESU", "BILIRUBINUA", "OCCULTUA", "NITRITE", "UROBILINOGEN", "LEUKOCYTESUR", "RBCUA", "WBCUA", "BACTERIA", "SQUAMEPITHEL", "HYALINECASTS" in the last 48 hours.    Invalid input(s): "WRIGHTSUR"    Significant Imaging: I have reviewed all pertinent imaging results/findings within the past 24 hours.  I have reviewed and interpreted all pertinent imaging results/findings within the past 24 hours.  Assessment/Plan:     * Post-operative wound abscess    Impression:     Postoperative change of prior partial colon resection with colostomy in the left " paramidline abdomen.  Redemonstration of nonspecific moderate localized mesenteric fat stranding and ill-defined fluid within the lower anterior abdomen and pelvis as detailed above.  This may reflect evolving nonspecific peritoneal infectious or inflammatory postsurgical process.     Postoperative change of the left inguinal canal containing nonspecific heterogeneous fluid/material.  There is a possible small abscess within the left inguinal canal extending to the skin surface as detailed above.  Correlation with physical examination advised.     Midline surgical incision with decreased volume of ill-defined fluid.  Additional interval decreased size of right anterior abdominal fluid collection.       WBC normal   No left shift  No fevers   Lactic normal       But having a lot of pain he says that is not being controlled        Nothing concerning on exam       PLAN      - ER started IV Zosyn             We can continue that for now .          Id like to have his Surgeon Dr Connors see him here and look at the CT and make its nothing to be concerned with       Continue abx until Dr Connors advises otherwise       - control pain    ( see post op pain problem )           Post-op pain    This is his Biggest concern and issue     He is having very hard time being heard he expressed to me .    He feels like he is not being taken serious about his pain     He said the IV Dilaudid worked when he was here  but then once he went to rehab they gave him 5 mg Norco which does nothing     The percocet 5 mg also doesn't do anything         His pain is mostly the lower abdomen from the surgery         PLAN      - id like to try some IV Valium + IV Dilaudid right now as I feel there is a component of anxiety about his pain not being controlled that is FUELLING more pain and anxiety     I find that the addition of the benzo often controls the pain better with less Opiate dose         - IV Dilaudid 1 mg PRN severe pain  Q6      - HOLD  the NORCO and Percocet   for now       Lets try OXYCODONE 20 mg po PRN Q6      moderate pain       AND    continue OxyContin   but increase to 20 mg po Q12 and start that NOW  also         - we could schedule some tylenol also , which can help ,    1 gram PO TID            - IV Pepcid BID     ESRD on hemodialysis  Creatine stable for now. BMP reviewed- noted Estimated Creatinine Clearance: 13 mL/min (A) (based on SCr of 10.1 mg/dL (H)). according to latest data. Based on current GFR, CKD stage is end stage.  Monitor UOP and serial BMP and adjust therapy as needed. Renally dose meds. Avoid nephrotoxic medications and procedures.    Consult his nephrologist while here for help with his fistula  ( if there is any issues ??)      And for HD mgt     Cont his ESRD meds     Renal diet        VTE Risk Mitigation (From admission, onward)           Ordered     Reason for No Pharmacological VTE Prophylaxis  Once        Question:  Reasons:  Answer:  Patient is Ambulatory    10/18/24 0455     IP VTE HIGH RISK PATIENT  Once         10/18/24 0455     Place sequential compression device  Until discontinued         10/18/24 0455                       On 10/18/2024, patient should be placed in hospital observation services under my care.        Pharmacist Renal Adjustment Note    João Ham is a 44 y.o. male being treated with Famotidine.     Patient Data:    Vital Signs (Most Recent):  Temp: 98.5 °F (36.9 °C) (10/17/24 1652)  Pulse: 101 (10/18/24 0200)  Resp: 19 (10/18/24 0200)  BP: 102/76 (10/18/24 0200)  SpO2: 95 % (10/18/24 0200) Vital Signs (72h Range):  Temp:  [98.5 °F (36.9 °C)]   Pulse:  []   Resp:  [10-19]   BP: ()/(57-88)   SpO2:  [95 %-99 %]      Recent Labs   Lab 10/17/24  1923   CREATININE 10.1*     Serum creatinine: 10.1 mg/dL (H) 10/17/24 1923  Estimated creatinine clearance: 13 mL/min (A)    Famotidine 20 mg every 12 hours will be changed to Famotidine 20 mg every 24 hours due to CrCl < 50 per Renal Dose  Adjustment protocol.    Pharmacist's Name: Miranda Mcgill  Pharmacist's Extension: [phone]      Neftali Cohn MD  Department of Hospital Medicine  [facility] - Emergency Dept

## 2024-10-18 NOTE — PLAN OF CARE
[facility]  Initial Discharge Assessment     Initial assessment completed with patient at bedside. SW verified facesheet information including PCP, pharmacy, insurance, address, phone numbers, and emergency contacts. Patient reports being independent with all ADLs prior to admission and does not use any DME at home. No HH, dialysis, or coumadin. Patient reports that his adult nephew lives with him. Pt was hospitalized and discharged on 10/6, readmission was completed.Grandmother will likely transport Pt home. Pt was at [facility], and was recommended for home health when discharged. SW spoke to Pt about this and he agreed that he would benefit from home health. Pt was provided a list of home health agencies and stated his preference is [facility] home health. Pt choice obtained and scanned into media. SW will continue to follow.    Primary Care Provider: Dawson Granado MD    Admission Diagnosis: Post-operative wound abscess [T81.49XA]    Admission Date: 10/17/2024  Expected Discharge Date: 10/23/2024    Transition of Care Barriers: None    Payor: BLUE CROSS [employer]UE [facility] / Plan: BCBS ALL OUT OF STATE / Product Type: PPO /     Extended Emergency Contact Information  Primary Emergency Contact: Khushboo Guidry  Mobile Phone: [phone]  Relation: Grandparent  Preferred language: English   needed? No  Secondary Emergency Contact: janae escamilla  Mobile Phone: [phone]  Relation: Daughter    Discharge Plan A: Home Health  Discharge Plan B: Home with family      [facility] [address] [place] - [address]  [address] [address]  Phone: [phone] Fax: [phone]      Initial Assessment (most recent)       Adult Discharge Assessment - 10/18/24 1008          Discharge Assessment    Assessment Type Discharge Planning Assessment     Confirmed/corrected address, phone number and insurance Yes     Confirmed Demographics Correct on Facesheet     Source of Information  patient     When was your last doctors appointment? --   July 2024    Does patient/caregiver understand observation status Yes     Reason For Admission Post-operative wound abscess     People in Home other relative(s)     Do you expect to return to your current living situation? Yes     Do you have help at home or someone to help you manage your care at home? Yes     Who are your caregiver(s) and their phone number(s)? Khushboo Guidry (Grandparent)  [phone] (Mobile)     Prior to hospitilization cognitive status: Alert/Oriented     Current cognitive status: Alert/Oriented     Walking or Climbing Stairs Difficulty no     Dressing/Bathing Difficulty no     Home Accessibility wheelchair accessible     Home Layout Able to live on 1st floor     Equipment Currently Used at Home none     Readmission within 30 days? Yes     Patient currently being followed by outpatient case management? No     Do you currently have service(s) that help you manage your care at home? No     Do you take prescription medications? Yes     Do you have prescription coverage? Yes     Coverage [facility] ALL OUT OF STATE     Do you have any problems affording any of your prescribed medications? No     Is the patient taking medications as prescribed? yes     Who is going to help you get home at discharge? Khushboo Guidry (Grandparent)  [phone] (Mobile)     How do you get to doctors appointments? car, drives self     Are you on dialysis? Yes     Dialysis Name and Scheduled days [facility] [address] MWF     Do you take coumadin? No     Discharge Plan A Home Health     Discharge Plan B Home with family     DME Needed Upon Discharge  none     Discharge Plan discussed with: Patient     Transition of Care Barriers None        Physical Activity    On average, how many days per week do you engage in moderate to strenuous exercise (like a brisk walk)? 7 days     On average, how many minutes do you engage in exercise at this level? 30 min         Financial Resource Strain    How hard is it for you to pay for the very basics like food, housing, medical care, and heating? Somewhat hard        Housing Stability    In the last 12 months, was there a time when you were not able to pay the mortgage or rent on time? No     At any time in the past 12 months, were you homeless or living in a shelter (including now)? No        Transportation Needs    Has the lack of transportation kept you from medical appointments, meetings, work or from getting things needed for daily living? No        Food Insecurity    Within the past 12 months, you worried that your food would run out before you got the money to buy more. Sometimes true     Within the past 12 months, the food you bought just didn't last and you didn't have money to get more. Never true        Stress    Do you feel stress - tense, restless, nervous, or anxious, or unable to sleep at night because your mind is troubled all the time - these days? To some extent        Social Isolation    How often do you feel lonely or isolated from those around you?  Never        Alcohol Use    Q1: How often do you have a drink containing alcohol? Never     Q2: How many drinks containing alcohol do you have on a typical day when you are drinking? Patient does not drink     Q3: How often do you have six or more drinks on one occasion? Never        Utilities    In the past 12 months has the electric, gas, oil, or water company threatened to shut off services in your home? No        Health Literacy    How often do you need to have someone help you when you read instructions, pamphlets, or other written material from your doctor or pharmacy? Never

## 2024-10-18 NOTE — PROGRESS NOTES
Pharmacist Renal Adjustment Note    João Ham is a 44 y.o. male being treated with Famotidine.     Patient Data:    Vital Signs (Most Recent):  Temp: 98.5 °F (36.9 °C) (10/17/24 1652)  Pulse: 101 (10/18/24 0200)  Resp: 19 (10/18/24 0200)  BP: 102/76 (10/18/24 0200)  SpO2: 95 % (10/18/24 0200) Vital Signs (72h Range):  Temp:  [98.5 °F (36.9 °C)]   Pulse:  []   Resp:  [10-19]   BP: ()/(57-88)   SpO2:  [95 %-99 %]      Recent Labs   Lab 10/17/24  1923   CREATININE 10.1*     Serum creatinine: 10.1 mg/dL (H) 10/17/24 1923  Estimated creatinine clearance: 13 mL/min (A)    Famotidine 20 mg every 12 hours will be changed to Famotidine 20 mg every 24 hours due to CrCl < 50 per Renal Dose Adjustment protocol.    Pharmacist's Name: Miranda Mcgill  Pharmacist's Extension: [phone]

## 2024-10-18 NOTE — ED NOTES
Answered patients call light and patients HOB lowered per patient request. Patient is resting in bed with side rails raised x 2. Bed locked and in lowest position Call light within reach of patient

## 2024-10-18 NOTE — SUBJECTIVE & OBJECTIVE
Current Facility-Administered Medications on File Prior to Encounter   Medication    [DISCONTINUED] hydrALAZINE tablet    [DISCONTINUED] HYDROmorphone tablet    [DISCONTINUED] LIDOcaine-prilocaine cream    [DISCONTINUED] senna-docusate 8.6-50 mg per tablet    [DISCONTINUED] sodium zirconium cyclosilicate packet     Current Outpatient Medications on File Prior to Encounter   Medication Sig    acetaminophen (TYLENOL) 325 MG tablet Take 2 tablets (650 mg total) by mouth every 4 (four) hours as needed.    ALPRAZolam (XANAX) 0.25 MG tablet Take 1 tablet (0.25 mg total) by mouth nightly as needed for Insomnia.    aluminum-magnesium hydroxide-simethicone (MAALOX) 200-200-20 mg/5 mL Susp Take 30 mLs by mouth 4 (four) times daily as needed.    calcitRIOL (ROCALTROL) 0.25 MCG Cap Take 1 capsule (0.25 mcg total) by mouth once daily.    calcium carbonate (TUMS) 200 mg calcium (500 mg) chewable tablet Take 2 tablets (1,000 mg total) by mouth 3 (three) times daily.    cloNIDine (CATAPRES) 0.3 MG tablet Take 1 tablet (0.3 mg total) by mouth 3 (three) times daily as needed (SBP > 150).    epoetin mily-epbx (RETACRIT) 20,000 unit/mL injection Inject 0.67 mLs (13,400 Units total) into the skin every Mon, Wed, Fri.    gabapentin (NEURONTIN) 100 MG capsule Take 1 capsule (100 mg total) by mouth 3 (three) times daily.    heparin sodium,porcine (HEPARIN, PORCINE,) 1,000 unit/mL injection Inject 4 mLs (4,000 Units total) into the vein as needed (line care after dialysis).    heparin sodium,porcine (HEPARIN, PORCINE,) 5,000 unit/mL injection Inject 1 mL (5,000 Units total) into the skin every 8 (eight) hours.    hydrALAZINE (APRESOLINE) 100 MG tablet Take 1 tablet (100 mg total) by mouth every 8 (eight) hours. Hold for SBP < 120 and before dialysis    HYDROmorphone (DILAUDID) 4 MG tablet Take 1 tablet (4 mg total) by mouth every 4 (four) hours as needed for Pain.    insulin aspart U-100 (NOVOLOG) 100 unit/mL (3 mL) InPn pen Inject 0-5  Units into the skin before meals and at bedtime as needed (Hyperglycemia).    isosorbide mononitrate (IMDUR) 30 MG 24 hr tablet Take 1 tablet (30 mg total) by mouth once daily.    ketoconazole (NIZORAL) 2 % shampoo Apply topically every other day.    LIDOcaine-prilocaine (EMLA) cream Apply topically as needed (pre dialysis).    melatonin (MELATIN) 3 mg tablet Take 2 tablets (6 mg total) by mouth nightly as needed for Insomnia.    metoprolol succinate (TOPROL-XL) 50 MG 24 hr tablet Take 150 mg by mouth once daily.    miconazole (MICOTIN) 2 % cream Apply topically 2 (two) times daily.    olmesartan (BENICAR) 40 MG tablet Take 1 tablet by mouth once daily (Patient taking differently: Take 40 mg by mouth once daily.)    ondansetron (ZOFRAN-ODT) 4 MG TbDL Take 1 tablet (4 mg total) by mouth every 6 (six) hours as needed (nausea).    oxyCODONE (OXYCONTIN) 10 mg 12 hr tablet Take 1 tablet (10 mg total) by mouth every 12 (twelve) hours.    oxymetazoline (AFRIN) 0.05 % nasal spray 1 spray by Nasal route 2 (two) times daily.    sevelamer carbonate (RENVELA) 800 mg Tab Take 2 tablets (1,600 mg total) by mouth 3 (three) times daily with meals.    simethicone (MYLICON) 80 MG chewable tablet Take 1 tablet (80 mg total) by mouth 3 (three) times daily as needed for Flatulence.       Review of patient's allergies indicates:   Allergen Reactions    Mushroom Swelling     Tongue swells    Tongue swells       Tongue swells       Past Medical History:   Diagnosis Date    Allergy     Anemia     Anxiety     CHF (congestive heart failure)     Depression     High blood pressure with chronic kidney disease, stage 5 chronic kidney disease or end stage renal disease     Hypertension      Past Surgical History:   Procedure Laterality Date    ABSCESS DRAINAGE Left 9/17/2024    Procedure: DRAINAGE, ABSCESS, GROIN;  Surgeon: Galileo Connors MD;  Location: [facility];  Service: General;  Laterality: Left;    COLON RESECTION  9/25/2024    Procedure:  COLON RESECTION;  Surgeon: Galileo Connors MD;  Location: [facility] OR;  Service: General;;    FISTULOGRAM Bilateral 4/30/2024    Procedure: Fistulogram;  Surgeon: Khoobehi, Ali, MD;  Location: [facility] CATH/EP LAB;  Service: Vascular;  Laterality: Bilateral;    FISTULOGRAM Left 9/24/2024    Procedure: Fistulogram;  Surgeon: Lorraine Salvador MD;  Location: [facility] CATH/EP LAB;  Service: General;  Laterality: Left;    HERNIA REPAIR Right     With mesh    INSERTION, CATHETER, TUNNELED N/A 6/22/2023    Procedure: Insertion,catheter,tunneled;  Surgeon: Everett Caicedo MD;  Location: [facility] OR;  Service: General;  Laterality: N/A;    LAPAROTOMY, EXPLORATORY N/A 9/25/2024    Procedure: LAPAROTOMY, EXPLORATORY;  Surgeon: Galileo Connors MD;  Location: [facility];  Service: General;  Laterality: N/A;    MOBILIZATION OF SPLENIC FLEXURE  9/25/2024    Procedure: MOBILIZATION, SPLENIC FLEXURE;  Surgeon: Galileo Connors MD;  Location: [facility] OR;  Service: General;;    PERCUTANEOUS TRANSLUMINAL ANGIOPLASTY OF ARTERIOVENOUS FISTULA Left 4/30/2024    Procedure: PTA, AV FISTULA;  Surgeon: Khoobehi, Ali, MD;  Location: [facility] CATH/EP LAB;  Service: Vascular;  Laterality: Left;    PERCUTANEOUS TRANSLUMINAL ANGIOPLASTY OF ARTERIOVENOUS FISTULA Left 9/24/2024    Procedure: PTA, AV FISTULA;  Surgeon: Lorraine Salvador MD;  Location: [facility] CATH/EP LAB;  Service: General;  Laterality: Left;    PHLEBOGRAPHY N/A 7/19/2024    Procedure: Venogram;  Surgeon: Khoobehi, Ali, MD;  Location: [facility] CATH/EP LAB;  Service: Vascular;  Laterality: N/A;    REMOVAL, TUNNELED CATH Right 7/19/2024    Procedure: REMOVAL, TUNNELED CATH;  Surgeon: Khoobehi, Ali, MD;  Location: [facility] CATH/EP LAB;  Service: Vascular;  Laterality: Right;    ROBOT-ASSISTED LAPAROSCOPIC RESECTION OF SIGMOID COLON USING DA DELANO XI N/A 9/17/2024    Procedure: XI ROBOTIC SIGMOID RESECTION, WITH ANASTAMOSIS;  Surgeon: Galileo Connors MD;  Location: [facility];  Service: General;  Laterality: N/A;   possible open have instruments available     Family History    None       Tobacco Use    Smoking status: Never     Passive exposure: Never    Smokeless tobacco: Never   Substance and Sexual Activity    Alcohol use: Never    Drug use: Never    Sexual activity: Not Currently     Review of Systems   Constitutional:  Negative for activity change and appetite change.   Gastrointestinal:  Positive for abdominal pain. Negative for abdominal distention, nausea and vomiting.   Skin:  Positive for wound. Negative for color change.        Open wound in the left groin was visualized.  Tissue itself appears to be granulating.  There is some mild purulent fluid coming from this.  In all the induration and swelling of the left groin is greatly improved compared to when I last saw him in late September.     Objective:     Vital Signs (Most Recent):  Temp: 98.2 °F (36.8 °C) (10/18/24 1100)  Pulse: 104 (10/18/24 1100)  Resp: 18 (10/18/24 1100)  BP: 93/62 (10/18/24 1100)  SpO2: 95 % (10/18/24 1100) Vital Signs (24h Range):  Temp:  [98.2 °F (36.8 °C)-98.5 °F (36.9 °C)] 98.2 °F (36.8 °C)  Pulse:  [] 104  Resp:  [10-20] 18  SpO2:  [95 %-99 %] 95 %  BP: ()/(57-88) 93/62     Weight: 120.5 kg (265 lb 10.5 oz)  Body mass index is 34.11 kg/m².     Physical Exam  Vitals reviewed.   Cardiovascular:      Rate and Rhythm: Normal rate.      Pulses: Normal pulses.   Pulmonary:      Effort: Pulmonary effort is normal.   Abdominal:      General: There is no distension.      Tenderness: There is no abdominal tenderness.      Hernia: No hernia is present.   Neurological:      Mental Status: He is alert.   Psychiatric:         Mood and Affect: Mood normal.            I have reviewed all pertinent lab results within the past 24 hours.  CBC:   Recent Labs   Lab 10/17/24  1923   WBC 9.91   RBC 2.95*   HGB 9.1*   HCT 30.0*      *   MCH 30.8   MCHC 30.3*     BMP:   Recent Labs   Lab 10/17/24  1923   GLU 92   *   K 5.0   CL 94*    CO2 29   BUN 22*   CREATININE 10.1*   CALCIUM 9.9       Significant Diagnostics:  Ct reviewed.  Fluid noted in the lower abdomen within the mesentery measuring 4 x 4 x 5.  I suspect this is likely residual seroma.  Left inguinal fluid collection measuring 1-1/2 x 2 cm.  Suspect that this does communicate with the skin in his draining appropriately.

## 2024-10-18 NOTE — HPI
44-year-old man with an unfortunate history of uninfected inguinal hernia mesh with gangrene resulting in complicated wound with wound VAC placement and colostomy.  He also has a history of end-stage renal disease on hemodialysis and presents to the emergency department today for multiple issues.  He had a culture obtained from his left groin wound where the wound VAC is in place on 10/15 that came back positive today for Gram-negative rods.  His right arm MID line was clogged and was forcefully being flushed according to the patient and left him with right shoulder and arm pain subsequently.  He wants the midline removed and another 1 placed.  He states his potassium has been high and he had to receive dialysis yesterday but then again today.  He believes this is due to the healthcare workers at the rehabilitation facility not administering his phosphorus binding medications prior to eating.       Non smoker  Non drinker    Currently living in rehab facility       DC summary from our service 10/6/2024    Patient is a 44 year old male with history of ESRD, was admitted with suspected inguinal hernia gangrene. General surgery was consulted and patient was taken to OR. Found to have colonic perforation due to mesh erosion with an abscess. Patient underwent sigmoid resection and drainage of the abscess, was started on clindamycin, Vancomycin and cefepime. Nephrology was consulted for chronic dialysis. AVF was not functioning, General surgery consulted, trialysis catheter placed. PRBC transfused during dialysis for low Hb 6.6.  While on broad-spectrum coverage, patient is spiking fevers and worsening leukocytosis, id consulted, discontinued clindamycin, vancomycin and cefepime-added daptomycin, Diflucan and Zosyn.  Repeated CT abdomen and pelvis that showed residual abscess/phlegmon and contained perforation. Re-consulted surgery who mentioned likely post op changes. Fistulogram 9/24 by vascular surgery for declotting  the AV fistula. Trialysis removed 9/25. Had worsening white count, fevers upto 103.5 and hypotension overnight 9/25.  Also had hemoglobin of 7, with worsening hypoxia up to 7 L (overnight desaturated to 70% with lethargy requiring BiPAP placement), after which nephrology recommended 1 unit PRBC transfusion.  Id, Nephrology, Urology, General surgery were updated about his worsening condition.We CT repeated that showed intraperitoneal free air and findings suggestive of necrotizing fasciitis, general surgery was consulted stat, patient was transferred to ICU.  The surgeon performed open laparotomy and noted fecal contamination of the left lower quadrant indicative of anastomotic disruption.  Colon was resected, and colostomy was created. Patient was placed on full liquid diet, was unable to tolerate and downgraded back to clear liquid diet.  Also complaining of excess mucus and cough.  Patient encouraged to use incentive spirometer as directed, and press pillow to abdomen when coughing.  Pulmonology signed off on 09/29 with recommendation to continue BiPAP nightly and with naps.  PT/OT recommended high-intensity therapy due to patient's deconditioning and discharge planning was started.  Pain regimen was adjusted.  Tachycardia and stagnant white blood cell count noted with low-grade temps-a CT abdomen and pelvis was pursued on 10/02.  This was revealing for 2 new fluid collections.  These were discussed with general surgery and IR.  IR did do an aspirate of the collection they felt easily accessible, this was negative on Gram stain with cultures pending.  Did not feel either fluid collection with rim enhancing or indicative of an abscess.  General surgery agreed.  Leukocytosis improved.  Temperature is improved.  He had issues with ongoing pain control and long-acting pain regimen was added. Patient cleared for discharge from surgical standpoint with f/u in 1-2 weeks. BERNABE drain removed and surgical site CDI. Patient  assessed on day of discharge and stable for discharge. Some overnight epistaxis reported but patient also reported that he was picking and blowing his nose a lot. Educated on nose bleeds and education included in discharge instructions. Patient also instructed to only use Afrin 1-2 times in a 24 hour period and not to exceed 3 days. This was also included in discharge instructions. Patient will continue abx thru 10/9 and then have midline removed. He is to f/u with ID in 3 weeks. He is also to f/u with urology in 2 weeks. Plan discussed with patient and he is agreeable and ready for discharge.               Impression: CT scan from [facility] in ER      Postoperative change of prior partial colon resection with colostomy in the left paramidline abdomen.  Redemonstration of nonspecific moderate localized mesenteric fat stranding and ill-defined fluid within the lower anterior abdomen and pelvis as detailed above.  This may reflect evolving nonspecific peritoneal infectious or inflammatory postsurgical process.     Postoperative change of the left inguinal canal containing nonspecific heterogeneous fluid/material.  There is a possible small abscess within the left inguinal canal extending to the skin surface as detailed above.  Correlation with physical examination advised.     Midline surgical incision with decreased volume of ill-defined fluid.  Additional interval decreased size of right anterior abdominal fluid collection.        His WBC was normal   No left shift    Lactic acid normal     CMP normal for ESRD     Potassium is 5         Plan is to admit to our service to address the findings on CT scan , his pain and any HD access issues with nephrology and General Surgery

## 2024-10-18 NOTE — ASSESSMENT & PLAN NOTE
Creatine stable for now. BMP reviewed- noted Estimated Creatinine Clearance: 13 mL/min (A) (based on SCr of 10.1 mg/dL (H)). according to latest data. Based on current GFR, CKD stage is end stage.  Monitor UOP and serial BMP and adjust therapy as needed. Renally dose meds. Avoid nephrotoxic medications and procedures.    Consult his nephrologist while here for help with his fistula  ( if there is any issues ??)      And for HD mgt     Cont his ESRD meds     Renal diet

## 2024-10-18 NOTE — ASSESSMENT & PLAN NOTE
This is his Biggest concern and issue     He is having very hard time being heard he expressed to me .    He feels like he is not being taken serious about his pain     He said the IV Dilaudid worked when he was here  but then once he went to rehab they gave him 5 mg Norco which does nothing     The percocet 5 mg also doesn't do anything         His pain is mostly the lower abdomen from the surgery         PLAN      - id like to try some IV Valium + IV Dilaudid right now as I feel there is a component of anxiety about his pain not being controlled that is FUELLING more pain and anxiety     I find that the addition of the benzo often controls the pain better with less Opiate dose         - IV Dilaudid 1 mg PRN severe pain  Q6      - HOLD the NORCO and Percocet   for now       Lets try OXYCODONE 20 mg po PRN Q6      moderate pain       AND    continue OxyContin   but increase to 20 mg po Q12 and start that NOW  also         - we could schedule some tylenol also , which can help ,    1 gram PO TID            - IV Pepcid BID

## 2024-10-18 NOTE — PLAN OF CARE
Pt choice was obtained and scanned into media. Pt was presented with a list of home health agencies and stated his preference is [facility] home health. Referrals were sent in [facility].     10/18/24 1020   Post-Acute Status   Post-Acute Authorization Home Health   Home Health Status Referrals Sent   Coverage Payor: [facility] ALL OUT OF STATE -   Patient choice form signed by patient/caregiver List from [employer]   Discharge Delays None known at this time   Discharge Plan   Discharge Plan A Home Health   Discharge Plan B Home with family

## 2024-10-18 NOTE — CARE UPDATE
Patient was seen and examined at bedside this morning.  He does not feel very good this morning.  Despite this, his labs are unremarkable.  He was seen and evaluated by Nephrology and General surgery.  No plan for further surgical intervention at this time.  Patient will have dialysis.  Currently awaiting replacement to rehab.  
Daughter

## 2024-10-18 NOTE — SUBJECTIVE & OBJECTIVE
Past Medical History:   Diagnosis Date    Allergy     Anemia     Anxiety     CHF (congestive heart failure)     Depression     High blood pressure with chronic kidney disease, stage 5 chronic kidney disease or end stage renal disease     Hypertension        Past Surgical History:   Procedure Laterality Date    ABSCESS DRAINAGE Left 9/17/2024    Procedure: DRAINAGE, ABSCESS, GROIN;  Surgeon: Galileo Connors MD;  Location: [facility] OR;  Service: General;  Laterality: Left;    COLON RESECTION  9/25/2024    Procedure: COLON RESECTION;  Surgeon: Galileo Connors MD;  Location: [facility] OR;  Service: General;;    FISTULOGRAM Bilateral 4/30/2024    Procedure: Fistulogram;  Surgeon: Khoobehi, Ali, MD;  Location: [facility] CATH/EP LAB;  Service: Vascular;  Laterality: Bilateral;    FISTULOGRAM Left 9/24/2024    Procedure: Fistulogram;  Surgeon: Lorraine Salvador MD;  Location: [facility] CATH/EP LAB;  Service: General;  Laterality: Left;    HERNIA REPAIR Right     With mesh    INSERTION, CATHETER, TUNNELED N/A 6/22/2023    Procedure: Insertion,catheter,tunneled;  Surgeon: Everett Caicedo MD;  Location: [facility] OR;  Service: General;  Laterality: N/A;    LAPAROTOMY, EXPLORATORY N/A 9/25/2024    Procedure: LAPAROTOMY, EXPLORATORY;  Surgeon: Galileo Connors MD;  Location: [facility];  Service: General;  Laterality: N/A;    MOBILIZATION OF SPLENIC FLEXURE  9/25/2024    Procedure: MOBILIZATION, SPLENIC FLEXURE;  Surgeon: Galileo Connors MD;  Location: [facility] OR;  Service: General;;    PERCUTANEOUS TRANSLUMINAL ANGIOPLASTY OF ARTERIOVENOUS FISTULA Left 4/30/2024    Procedure: PTA, AV FISTULA;  Surgeon: Khoobehi, Ali, MD;  Location: [facility] CATH/EP LAB;  Service: Vascular;  Laterality: Left;    PERCUTANEOUS TRANSLUMINAL ANGIOPLASTY OF ARTERIOVENOUS FISTULA Left 9/24/2024    Procedure: PTA, AV FISTULA;  Surgeon: Lorraine Salvador MD;  Location: [facility] CATH/EP LAB;  Service: General;  Laterality: Left;    PHLEBOGRAPHY N/A 7/19/2024    Procedure: Venogram;   Surgeon: Khoobehi, Ali, MD;  Location: [facility] CATH/EP LAB;  Service: Vascular;  Laterality: N/A;    REMOVAL, TUNNELED CATH Right 7/19/2024    Procedure: REMOVAL, TUNNELED CATH;  Surgeon: Khoobehi, Ali, MD;  Location: [facility] CATH/EP LAB;  Service: Vascular;  Laterality: Right;    ROBOT-ASSISTED LAPAROSCOPIC RESECTION OF SIGMOID COLON USING DA DELANO XI N/A 9/17/2024    Procedure: XI ROBOTIC SIGMOID RESECTION, WITH ANASTAMOSIS;  Surgeon: Galileo Connors MD;  Location: [facility] OR;  Service: General;  Laterality: N/A;  possible open have instruments available       Review of patient's allergies indicates:   Allergen Reactions    Mushroom Swelling     Tongue swells    Tongue swells       Tongue swells       Current Facility-Administered Medications on File Prior to Encounter   Medication    [DISCONTINUED] hydrALAZINE tablet    [DISCONTINUED] HYDROmorphone tablet    [DISCONTINUED] LIDOcaine-prilocaine cream    [DISCONTINUED] senna-docusate 8.6-50 mg per tablet    [DISCONTINUED] sodium zirconium cyclosilicate packet     Current Outpatient Medications on File Prior to Encounter   Medication Sig    acetaminophen (TYLENOL) 325 MG tablet Take 2 tablets (650 mg total) by mouth every 4 (four) hours as needed.    ALPRAZolam (XANAX) 0.25 MG tablet Take 1 tablet (0.25 mg total) by mouth nightly as needed for Insomnia.    aluminum-magnesium hydroxide-simethicone (MAALOX) 200-200-20 mg/5 mL Susp Take 30 mLs by mouth 4 (four) times daily as needed.    calcitRIOL (ROCALTROL) 0.25 MCG Cap Take 1 capsule (0.25 mcg total) by mouth once daily.    calcium carbonate (TUMS) 200 mg calcium (500 mg) chewable tablet Take 2 tablets (1,000 mg total) by mouth 3 (three) times daily.    cloNIDine (CATAPRES) 0.3 MG tablet Take 1 tablet (0.3 mg total) by mouth 3 (three) times daily as needed (SBP > 150).    epoetin mily-epbx (RETACRIT) 20,000 unit/mL injection Inject 0.67 mLs (13,400 Units total) into the skin every Mon, Wed, Fri.    gabapentin (NEURONTIN)  100 MG capsule Take 1 capsule (100 mg total) by mouth 3 (three) times daily.    heparin sodium,porcine (HEPARIN, PORCINE,) 1,000 unit/mL injection Inject 4 mLs (4,000 Units total) into the vein as needed (line care after dialysis).    heparin sodium,porcine (HEPARIN, PORCINE,) 5,000 unit/mL injection Inject 1 mL (5,000 Units total) into the skin every 8 (eight) hours.    hydrALAZINE (APRESOLINE) 100 MG tablet Take 1 tablet (100 mg total) by mouth every 8 (eight) hours. Hold for SBP < 120 and before dialysis    HYDROmorphone (DILAUDID) 4 MG tablet Take 1 tablet (4 mg total) by mouth every 4 (four) hours as needed for Pain.    insulin aspart U-100 (NOVOLOG) 100 unit/mL (3 mL) InPn pen Inject 0-5 Units into the skin before meals and at bedtime as needed (Hyperglycemia).    isosorbide mononitrate (IMDUR) 30 MG 24 hr tablet Take 1 tablet (30 mg total) by mouth once daily.    ketoconazole (NIZORAL) 2 % shampoo Apply topically every other day.    LIDOcaine-prilocaine (EMLA) cream Apply topically as needed (pre dialysis).    melatonin (MELATIN) 3 mg tablet Take 2 tablets (6 mg total) by mouth nightly as needed for Insomnia.    metoprolol succinate (TOPROL-XL) 50 MG 24 hr tablet Take 150 mg by mouth once daily.    miconazole (MICOTIN) 2 % cream Apply topically 2 (two) times daily.    olmesartan (BENICAR) 40 MG tablet Take 1 tablet by mouth once daily    ondansetron (ZOFRAN-ODT) 4 MG TbDL Take 1 tablet (4 mg total) by mouth every 6 (six) hours as needed (nausea).    oxyCODONE (OXYCONTIN) 10 mg 12 hr tablet Take 1 tablet (10 mg total) by mouth every 12 (twelve) hours.    oxymetazoline (AFRIN) 0.05 % nasal spray 1 spray by Nasal route 2 (two) times daily.    sevelamer carbonate (RENVELA) 800 mg Tab Take 2 tablets (1,600 mg total) by mouth 3 (three) times daily with meals.    simethicone (MYLICON) 80 MG chewable tablet Take 1 tablet (80 mg total) by mouth 3 (three) times daily as needed for Flatulence.     Family History     None       Tobacco Use    Smoking status: Never     Passive exposure: Never    Smokeless tobacco: Never   Substance and Sexual Activity    Alcohol use: Never    Drug use: Never    Sexual activity: Not Currently     Review of Systems   All other systems reviewed and are negative.    Objective:     Vital Signs (Most Recent):  Temp: 98.5 °F (36.9 °C) (10/17/24 1652)  Pulse: 101 (10/18/24 0200)  Resp: 19 (10/18/24 0200)  BP: 102/76 (10/18/24 0200)  SpO2: 95 % (10/18/24 0200) Vital Signs (24h Range):  Temp:  [98.5 °F (36.9 °C)] 98.5 °F (36.9 °C)  Pulse:  [] 101  Resp:  [10-19] 19  SpO2:  [95 %-99 %] 95 %  BP: ()/(57-88) 102/76     Weight: 121.1 kg (267 lb)  Body mass index is 34.28 kg/m².     Physical Exam  Vitals and nursing note reviewed.   Constitutional:       Appearance: Normal appearance.   HENT:      Head: Normocephalic.      Nose: Nose normal.      Mouth/Throat:      Mouth: Mucous membranes are moist.   Eyes:      Pupils: Pupils are equal, round, and reactive to light.   Cardiovascular:      Rate and Rhythm: Normal rate.      Pulses: Normal pulses.      Heart sounds: Normal heart sounds.   Pulmonary:      Effort: Pulmonary effort is normal.      Breath sounds: Normal breath sounds.   Abdominal:      General: Abdomen is flat. Bowel sounds are normal.      Palpations: Abdomen is soft.      Comments: Colostomy bag in place    Musculoskeletal:         General: Normal range of motion.      Cervical back: Normal range of motion.   Skin:     General: Skin is warm.      Capillary Refill: Capillary refill takes less than 2 seconds.   Neurological:      General: No focal deficit present.      Mental Status: He is alert and oriented to person, place, and time.   Psychiatric:         Mood and Affect: Mood normal.         Behavior: Behavior normal.              CRANIAL NERVES     CN III, IV, VI   Pupils are equal, round, and reactive to light.       Significant Labs: All pertinent labs within the past 24 hours  "have been reviewed.  CBC:   Recent Labs   Lab 10/17/24  1923   WBC 9.91   HGB 9.1*   HCT 30.0*        CMP:   Recent Labs   Lab 10/17/24  1923   *   K 5.0   CL 94*   CO2 29   GLU 92   BUN 22*   CREATININE 10.1*   CALCIUM 9.9   PROT 8.7*   ALBUMIN 3.9   BILITOT 0.5   ALKPHOS 92   AST 25   ALT 27   ANIONGAP 11     Cardiac Markers: No results for input(s): "CKMB", "MYOGLOBIN", "BNP", "TROPISTAT" in the last 48 hours.  Coagulation: No results for input(s): "PT", "INR", "APTT" in the last 48 hours.  Lactic Acid: No results for input(s): "LACTATE" in the last 48 hours.  Lipid Panel: No results for input(s): "CHOL", "HDL", "LDLCALC", "TRIG", "CHOLHDL" in the last 48 hours.  Magnesium: No results for input(s): "MG" in the last 48 hours.  Troponin: No results for input(s): "TROPONINI", "TROPONINIHS" in the last 48 hours.  TSH: No results for input(s): "TSH" in the last 4320 hours.  Urine Studies: No results for input(s): "COLORU", "APPEARANCEUA", "PHUR", "SPECGRAV", "PROTEINUA", "GLUCUA", "KETONESU", "BILIRUBINUA", "OCCULTUA", "NITRITE", "UROBILINOGEN", "LEUKOCYTESUR", "RBCUA", "WBCUA", "BACTERIA", "SQUAMEPITHEL", "HYALINECASTS" in the last 48 hours.    Invalid input(s): "WRIGHTSUR"    Significant Imaging: I have reviewed all pertinent imaging results/findings within the past 24 hours.  I have reviewed and interpreted all pertinent imaging results/findings within the past 24 hours.  " No

## 2024-10-18 NOTE — HPI
45 yo M whom I am familiar with.  Pt presented 9/17 with phelgmon/abscess in the L groin and loop of colon within hernia.  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  Attempted robotic resection and repair.  Unfortunately he had a postoperative leak requiring exploratory laparotomy and end colostomy.  He had been treated at the [facility] facility.  He was having some abdominal pain.  Cultures were taken of the left groin which does drained some mild purulent fluid and did demonstrate Gram-negative rods.  He had a denies fevers.  Notes the wound has been healing.  Of note he also notes that his midline incision at the lower portion has dehisced.  His ostomy has been functioning well.  CT scan was done yesterday with a small fluid collection noted in the left groin.  There was also a chronic fluid collection between the in the mesentery.  Loops of bowel in the lower abdomen.  Per my review these do not appear consistent with obvious abscess.  Surgery was asked to see patient.

## 2024-10-18 NOTE — ASSESSMENT & PLAN NOTE
Impression:     Postoperative change of prior partial colon resection with colostomy in the left paramidline abdomen.  Redemonstration of nonspecific moderate localized mesenteric fat stranding and ill-defined fluid within the lower anterior abdomen and pelvis as detailed above.  This may reflect evolving nonspecific peritoneal infectious or inflammatory postsurgical process.     Postoperative change of the left inguinal canal containing nonspecific heterogeneous fluid/material.  There is a possible small abscess within the left inguinal canal extending to the skin surface as detailed above.  Correlation with physical examination advised.     Midline surgical incision with decreased volume of ill-defined fluid.  Additional interval decreased size of right anterior abdominal fluid collection.       WBC normal   No left shift  No fevers   Lactic normal       But having a lot of pain he says that is not being controlled        Nothing concerning on exam       PLAN      - ER started IV Zosyn             We can continue that for now .          Id like to have his Surgeon Dr Connors see him here and look at the CT and make its nothing to be concerned with       Continue abx until Dr Connors advises otherwise       - control pain    ( see post op pain problem )

## 2024-10-18 NOTE — ED NOTES
Patient placed in trendelenburg position. Patient is resting in bed with. Chest rise and fall noted one exam. Patient does not express any needs at this time. Bed is locked and in lowest position. Side rails raised x 2. Call light left in reach of patient.

## 2024-10-18 NOTE — CONSULTS
Rn assessed patient with family member at bedside.         RN cleaned groin site with wound wash; dried, and placed gauze secured with paper tape           Abdominal incision cleaned with wound wash. Gauze moistened with saline applied to wound bed. Cover with gauze and ABD pad. Secured with paper tap.     Ostomy site cleaned and appliance change to a 2- piece. Stoma, pink, moist and wet. Patient stool is brown and formed. Education given to patient and family member about how to change and care for ostomy.

## 2024-10-18 NOTE — PROGRESS NOTES
INPATIENT NEPHROLOGY Progress Note  [facility] NEPHROLOGY    João Ham  10/18/2024    Reason for consultation:  ESRD    Chief Complaint:   Chief Complaint   Patient presents with    Wound Infection     + wound culture for s/p hernia repair     Hyperkalemia     Is currently being dialyzed daily due to potassium level      History of Present Illness:  Per H and P    Patient is a 44 year old male with history of ESDR on dialysis MWF, awaiting kidney transplant, HTN, presented to ED with 3 days history of left groin pain and swelling. States swelling comes every time he cough and is being painful. Patient has low grade fever 99s at home. He has been vomiting bilious fluid last 2-3 days. No diarrhea or abdominal pain.      In the ED CT abdomen showed Left inguinal hernia containing fat and air as well as marked inflammation extending from inside the pelvis, in the hernia and down into the left scrotum. This is consistent with an infected inguinal hernia, possible gangrene. WBC was 14, patient was tachycardic. General surgery was consulted and patient was admitted under hospitalist.     9/19  avf nonfunctional.   No sob or chest pain.  Has post op pain.  AFVSS  9/20  afvss.  Pt doesn't want to see previous vascular surgeon who put his avf in.  No alternative available.  Transfer center called.  Has temporary trialysis catheter.  Patient seen on hemodialysis for uremic clearance and ultrafiltration.    9/21  2.5L off w/HD yesterday.  Tmax 101.8, pressures ok.  Lab results reviewed, Hgb low but better than yesterday.  C/o post op pain, no other complaints.  9/22 POD 5.  VSS, lab results reviewed.  Hgb 7.6, added epo to HD orders.  C/o gas pains, plans to move around more today.  Next HD tomorrow.  9/23 VSS. HD today. Transfuse with next HD as needed if Hgb stil low.  9/24 VSS. Appreciate input from Urology, Gen Surgery, Vascular Surgery, ID on this complex patient. Plan for fistulogram tomorrow. CXR yesterday shows  trialysis line tip in appropriate cocation. Will attempt HD today since we skipped yesterday due to nurse and patient concerns that the line may be malpositioned.  9/25 VSS. s/p cephalic vein dilation by Dr. Robledo yesterday, tolerated HD very well. Hold HD today. Hypotension this AM, low grade fever yesterday, WBC elevated, received IVF bolus and Midodrine. Continues to be infected per Surgery, we can remove trialysis line now that AVF is treated and usable... Consider ICU. Stopped hydralazine and olmesartan, decreased Clonidine to 0.1 BID, not sure what to do with Metoprolol 150mg and Hydralazine 120mg... He may be sob/hypoxic due to anemia, suggest 1 PRBC instead of IVF, since Hgb is 7.    Addendum @ 10:47 AM  Seen at bedside in ICU with Dr. Rodriguez and Dr. Au. Mother present as well. Reviewed imaging. Agree with plan for urgent ex lap, remove central line and place mid line, keep current abx, hold on transfusion and dialysis and reassess later. ABG and fresh set of cx now, re-evalaute later.    Addendum @ 4:36 PM  Discussed briefly with Dr. Connors, dialysis nurse Brant and with ICU nurse. Patient had colostomy placed due to anastomotic leak and had 2 PRBC given. Upon return to floor was hypertensive with SBP > 200 and NPO. Advised Cleviprex drip for now (earlier to day he was on max dose Clonidine, mad dose Hydralazine, max dose Olmesartan, 150mg Metoprolol-XL and 120mg of Imdur - none of which he can get due to NPO) and will do urgent HD with UF 2L or so as tolerated.  9/26  POD 1 s/p . Exploratory laparotomy,. Colon resection end-colostomy, Mobilization of splenic flexure, and left inguinal canal exploration.  Post pain. No sob.  Sleepy.    9/27  AFVSS.  Some post op pain.  No sob.  No angina.   Patient seen on hemodialysis for uremic clearance and ultrafiltration.    9/28 s/p 3.8 liter uf yesterday.     AFVSS.  Post op pain.  Dry cough.  No sob.  Projectile vomiting earlier today per patient  9/30 VSS. HD  today.  10/1 GS note reviewed, working on pain control, tolerating diet  10/2 no major events overnight- due for HD today  10/3 fever, tachycardia, abd pain and stagnant leukocytosis yest- CT with possible fluid collection- may need IR drainage- remains on IV antbx  10/4 fluid collection neg for infection- working on discharge plan to NSR today- will do HD before discharge- pain control ongoing issue- hospital medicine aware  10/5  Plan is now d/c to rehab tomorrow, working on pain control.  2.5L UF w/HD yesterday.  VSS, lab results reviewed.  Added on PO4 level to labs--hasn't had one for several days--resulted at 8.5, so increased renvela dose.  Notified pt that dosing may change based on lab results.  10/6 VSS. OK to dc to rehab.    Plan of Care:    ESRD on HD MWF--jhold today.  Pt just had 3 days in a row  --continue dialysis per routine    Anemia of CKD  --Hb drop- bump LOR with HD today  --transfused this admission- no acute transfusion needs today    Secondary HPT  --renal diet  --continue binders with meals    Hypertension  --continue current BP meds  --uf with hd as needed    Hyponatremia  Hyperkalemia  Acidosis  --adjust dialysate  --renal diet    AVF malfunction fixed  --appreciate Vascular eval, s/p fistulogram and cephalic vein stenosis dilation on 9/23 by Dr. Salvador.    Inguinal hernia of left side with gangrene - W/ Colonic perforation with abscess   --S/P Robotic sigmoid resection, Mobilization of splenic flexure, left inguinal canal exploration, and I&D 9/17 per Dr. Connors   --dose antbx for CrCl < 10/HD  --CT 10/2 with new fluid collection- IR aspiration neg for infection    Thank you for allowing us to participate in this patient's care. We will continue to follow.    Vital Signs:  Temp Readings from Last 3 Encounters:   10/18/24 98.2 °F (36.8 °C) (Oral)   10/06/24 97.5 °F (36.4 °C) (Oral)   08/22/24 97.3 °F (36.3 °C) (Temporal)       Pulse Readings from Last 3 Encounters:   10/18/24 (!) 115    10/06/24 86   08/29/24 (!) 115       BP Readings from Last 3 Encounters:   10/18/24 131/88   10/06/24 102/66   08/29/24 99/68       Weight:  Wt Readings from Last 3 Encounters:   10/18/24 120.5 kg (265 lb 10.5 oz)   09/18/24 134 kg (295 lb 6.7 oz)   08/29/24 131.3 kg (289 lb 7.4 oz)       Medications:  Current Facility-Administered Medications on File Prior to Encounter   Medication Dose Route Frequency Provider Last Rate Last Admin    [DISCONTINUED] hydrALAZINE tablet  50 mg Oral  Provider, Generic External Data        [DISCONTINUED] HYDROmorphone tablet  4 mg Oral  Provider, Generic External Data        [DISCONTINUED] LIDOcaine-prilocaine cream   Topical  Provider, Generic External Data        [DISCONTINUED] senna-docusate 8.6-50 mg per tablet  1 tablet Oral  Provider, Generic External Data        [DISCONTINUED] sodium zirconium cyclosilicate packet  10 g Oral  Provider, Generic External Data         Current Outpatient Medications on File Prior to Encounter   Medication Sig Dispense Refill    acetaminophen (TYLENOL) 325 MG tablet Take 2 tablets (650 mg total) by mouth every 4 (four) hours as needed.      ALPRAZolam (XANAX) 0.25 MG tablet Take 1 tablet (0.25 mg total) by mouth nightly as needed for Insomnia.      aluminum-magnesium hydroxide-simethicone (MAALOX) 200-200-20 mg/5 mL Susp Take 30 mLs by mouth 4 (four) times daily as needed.      calcitRIOL (ROCALTROL) 0.25 MCG Cap Take 1 capsule (0.25 mcg total) by mouth once daily.      calcium carbonate (TUMS) 200 mg calcium (500 mg) chewable tablet Take 2 tablets (1,000 mg total) by mouth 3 (three) times daily. 180 tablet 11    cloNIDine (CATAPRES) 0.3 MG tablet Take 1 tablet (0.3 mg total) by mouth 3 (three) times daily as needed (SBP > 150).      epoetin mily-epbx (RETACRIT) 20,000 unit/mL injection Inject 0.67 mLs (13,400 Units total) into the skin every Mon, Wed, Fri.      gabapentin (NEURONTIN) 100 MG capsule Take 1 capsule (100 mg total) by mouth 3 (three)  times daily.      heparin sodium,porcine (HEPARIN, PORCINE,) 1,000 unit/mL injection Inject 4 mLs (4,000 Units total) into the vein as needed (line care after dialysis).      heparin sodium,porcine (HEPARIN, PORCINE,) 5,000 unit/mL injection Inject 1 mL (5,000 Units total) into the skin every 8 (eight) hours.      hydrALAZINE (APRESOLINE) 100 MG tablet Take 1 tablet (100 mg total) by mouth every 8 (eight) hours. Hold for SBP < 120 and before dialysis      HYDROmorphone (DILAUDID) 4 MG tablet Take 1 tablet (4 mg total) by mouth every 4 (four) hours as needed for Pain.      insulin aspart U-100 (NOVOLOG) 100 unit/mL (3 mL) InPn pen Inject 0-5 Units into the skin before meals and at bedtime as needed (Hyperglycemia).      isosorbide mononitrate (IMDUR) 30 MG 24 hr tablet Take 1 tablet (30 mg total) by mouth once daily.      ketoconazole (NIZORAL) 2 % shampoo Apply topically every other day.      LIDOcaine-prilocaine (EMLA) cream Apply topically as needed (pre dialysis).      melatonin (MELATIN) 3 mg tablet Take 2 tablets (6 mg total) by mouth nightly as needed for Insomnia.      metoprolol succinate (TOPROL-XL) 50 MG 24 hr tablet Take 150 mg by mouth once daily.      miconazole (MICOTIN) 2 % cream Apply topically 2 (two) times daily.      olmesartan (BENICAR) 40 MG tablet Take 1 tablet by mouth once daily (Patient taking differently: Take 40 mg by mouth once daily.) 90 tablet 0    ondansetron (ZOFRAN-ODT) 4 MG TbDL Take 1 tablet (4 mg total) by mouth every 6 (six) hours as needed (nausea).      oxyCODONE (OXYCONTIN) 10 mg 12 hr tablet Take 1 tablet (10 mg total) by mouth every 12 (twelve) hours.      oxymetazoline (AFRIN) 0.05 % nasal spray 1 spray by Nasal route 2 (two) times daily.      sevelamer carbonate (RENVELA) 800 mg Tab Take 2 tablets (1,600 mg total) by mouth 3 (three) times daily with meals. 180 tablet 11    simethicone (MYLICON) 80 MG chewable tablet Take 1 tablet (80 mg total) by mouth 3 (three) times  "daily as needed for Flatulence.       Scheduled Meds:   calcitRIOL  0.25 mcg Oral Daily    calcium carbonate  1,000 mg Oral TID    famotidine (PF)  20 mg Intravenous Daily    gabapentin  100 mg Oral TID    hydrALAZINE  100 mg Oral Q8H    HYDROmorphone  1 mg Intravenous Once    isosorbide mononitrate  30 mg Oral Daily    losartan  100 mg Oral Daily    metoprolol succinate  150 mg Oral Daily    oxyCODONE  20 mg Oral Q12H    senna-docusate 8.6-50 mg  2 tablet Oral BID    sevelamer carbonate  1,600 mg Oral TID WM     Continuous Infusions:        PRN Meds:.  Current Facility-Administered Medications:     acetaminophen, 650 mg, Oral, Q8H PRN    acetaminophen, 650 mg, Oral, Q4H PRN    aluminum-magnesium hydroxide-simethicone, 30 mL, Oral, QID PRN    cloNIDine, 0.3 mg, Oral, TID PRN    dextrose 50%, 12.5 g, Intravenous, PRN    dextrose 50%, 25 g, Intravenous, PRN    diazePAM, 5 mg, Intravenous, Q6H PRN    glucagon (human recombinant), 1 mg, Intramuscular, PRN    glucose, 16 g, Oral, PRN    glucose, 24 g, Oral, PRN    HYDROmorphone, 1 mg, Intravenous, Q6H PRN    magnesium oxide, 800 mg, Oral, PRN    magnesium oxide, 800 mg, Oral, PRN    melatonin, 6 mg, Oral, Nightly PRN    naloxone, 0.02 mg, Intravenous, PRN    ondansetron, 4 mg, Intravenous, Q6H PRN    oxyCODONE, 20 mg, Oral, Q6H PRN    potassium bicarbonate, 35 mEq, Oral, PRN    potassium bicarbonate, 50 mEq, Oral, PRN    potassium bicarbonate, 60 mEq, Oral, PRN    potassium, sodium phosphates, 2 packet, Oral, PRN    potassium, sodium phosphates, 2 packet, Oral, PRN    potassium, sodium phosphates, 2 packet, Oral, PRN    sodium chloride 0.9%, 3 mL, Intravenous, Q12H PRN    Review of Systems:  Neg    Physical Exam:    /88   Pulse (!) 115   Temp 98.2 °F (36.8 °C) (Oral)   Resp 18   Ht 6' 2" (1.88 m)   Wt 120.5 kg (265 lb 10.5 oz)   SpO2 96%   BMI 34.11 kg/m²     General Appearance:    Alert, cooperative, no distress, appears stated age   Head:    Normocephalic, " "without obvious abnormality, atraumatic   Eyes:    PER, conjunctiva/corneas clear, EOM's intact in both eyes        Throat:   Lips, mucosa, and tongue normal; teeth and gums normal   Back:     Symmetric, no curvature, ROM normal, no CVA tenderness   Lungs:     Clear to auscultation bilaterally, respirations unlabored   Chest wall:    No tenderness or deformity   Heart:    Regular rate and rhythm, S1 and S2 normal, no murmur, rub   or gallop   Abdomen:     Soft, non-tender, bowel sounds active all four quadrants,     no masses, no organomegaly   Extremities:   Extremities normal, atraumatic, no cyanosis or edema   Pulses:   2+ and symmetric all extremities   MSK:   No joint or muscle swelling, tenderness or deformity   Skin:   Skin color, texture, turgor normal, no rashes or lesions   Neurologic:   CNII-XII intact, normal strength and sensation       Throughout.  No flap     Results:  Recent Labs   Lab 10/17/24  1923   *   K 5.0   CL 94*   CO2 29   BUN 22*   CREATININE 10.1*   GLU 92       Recent Labs   Lab 10/17/24  1923   CALCIUM 9.9   ALBUMIN 3.9       Recent Labs   Lab 02/02/23  0745 05/19/23  1022 06/19/23  1031   PTH, Intact 225.6 H 335.8 H 319.0 H       No results for input(s): "POCTGLUCOSE" in the last 168 hours.      Recent Labs   Lab 10/10/22  2005 06/22/23  0446 07/16/24  0510   Hemoglobin A1C 5.5 5.5 6.1       Recent Labs   Lab 10/17/24  1923   WBC 9.91   HGB 9.1*   HCT 30.0*      *   MCHC 30.3*   MONO 13.3  1.3*   EOSINOPHIL 3.7       Recent Labs   Lab 10/17/24  1923   BILITOT 0.5   PROT 8.7*   ALBUMIN 3.9   ALKPHOS 92   ALT 27   AST 25       Recent Labs   Lab 10/10/22  1645 01/09/23  1123 06/19/23  1622 07/17/24  0505 09/19/24  1435   Color, UA Yellow   < > Straw Yellow Yellow   Appearance, UA Clear   < > Clear Hazy A Clear   pH, UA 6.0   < > 6.0 6.0 8.0   Specific Gravity, UA 1.025   < > 1.010 1.020 1.010   Protein, UA 2+ A   < > 2+ A 3+ A 2+ A   Glucose, UA Negative   < > Negative " Negative Negative   Ketones, UA Negative   < > Negative Negative Negative   Urobilinogen, UA Negative  --   --  Negative Negative   Bilirubin (UA) Negative   < > Negative Negative Negative   Occult Blood UA 1+ A   < > Negative 2+ A 2+ A   Nitrite, UA Negative   < > Negative Negative Negative   RBC, UA 1   < > 0 6 H 4   WBC, UA 0   < > 2 52 H 21 H   Bacteria None   < > None None None   Hyaline Casts, UA 0   < > 0 0 0    < > = values in this interval not displayed.       Recent Labs   Lab 09/17/24  0928 09/25/24  1051 10/17/24  1923   POC PH  --  7.404  --    POC PCO2  --  37.7  --    POC HCO3  --  23.6 L  --    POC PO2  --  87  --    POC SATURATED O2  --  97  --    POC BE  --  -1  --    Sample VENOUS ARTERIAL VENOUS       Microbiology Results (last 7 days)       Procedure Component Value Units Date/Time    Blood culture x two cultures. Draw prior to antibiotics. [[id number]] Collected: 10/17/24 1853    Order Status: Completed Specimen: Blood from Peripheral, Antecubital, Left Updated: 10/18/24 0158     Blood Culture, Routine No Growth to date    Narrative:      Aerobic and anaerobic  Collection has been rescheduled by AJO at 10/17/2024 18:16 Reason: Dr   in room said to come back  Collection has been rescheduled by AJO at 10/17/2024 18:16 Reason: Dr   in room said to come back    Blood culture x two cultures. Draw prior to antibiotics. [[id number]] Collected: 10/17/24 1943    Order Status: Completed Specimen: Blood from Peripheral, Hand, Left Updated: 10/18/24 0158     Blood Culture, Routine No Growth to date    Narrative:      Aerobic and anaerobic  Collection has been rescheduled by AJO at 10/17/2024 18:16 Reason: Dr   in room said to come back  Collection has been rescheduled by AJO at 10/17/2024 18:56 Reason: Pt   refusing to be stuck in hand. Come back later for second set.   Collection has been rescheduled by AJO at 10/17/2024 19:40 Reason: RN   called and said she collected the second set  Collection has been  rescheduled by AJO at 10/17/2024 18:16 Reason: Dr   in room said to come back  Collection has been rescheduled by AJO at 10/17/2024 18:56 Reason: Pt   refusing to be stuck in hand. Come back later for second set.   Collection has been rescheduled by AJO at 10/17/2024 19:40 Reason: RN   called and said she collected the second set          Patient care time was spent personally by me on the following activities: > 35 minutes  Obtaining a history.  Examination of patient.  Providing medical care at the patients bedside.  Developing a treatment plan with patient or surrogate and bedside caregivers.  Ordering and reviewing laboratory studies, radiographic studies, pulse oximetry.  Ordering and performing treatments and interventions.  Evaluation of patient's response to treatment.  Discussions with consultants while on the unit and immediately available to the patient.  Re-evaluation of the patient's condition.  Documentation in the medical record.      Mariano Hickey MD    [facility] [place]  [phone]

## 2024-10-19 PROBLEM — E43 SEVERE MALNUTRITION: Status: ACTIVE | Noted: 2024-10-19

## 2024-10-19 LAB
ALBUMIN SERPL BCP-MCNC: 3.7 G/DL (ref 3.5–5.2)
ALP SERPL-CCNC: 81 U/L (ref 55–135)
ALT SERPL W/O P-5'-P-CCNC: 21 U/L (ref 10–44)
ANION GAP SERPL CALC-SCNC: 16 MMOL/L (ref 8–16)
AST SERPL-CCNC: 17 U/L (ref 10–40)
BASOPHILS # BLD AUTO: 0.02 K/UL (ref 0–0.2)
BASOPHILS NFR BLD: 0.3 % (ref 0–1.9)
BILIRUB SERPL-MCNC: 0.4 MG/DL (ref 0.1–1)
BUN SERPL-MCNC: 37 MG/DL (ref 6–20)
CALCIUM SERPL-MCNC: 9.7 MG/DL (ref 8.7–10.5)
CHLORIDE SERPL-SCNC: 93 MMOL/L (ref 95–110)
CO2 SERPL-SCNC: 22 MMOL/L (ref 23–29)
CREAT SERPL-MCNC: 14.5 MG/DL (ref 0.5–1.4)
DIFFERENTIAL METHOD BLD: ABNORMAL
EOSINOPHIL # BLD AUTO: 0.4 K/UL (ref 0–0.5)
EOSINOPHIL NFR BLD: 5.9 % (ref 0–8)
ERYTHROCYTE [DISTWIDTH] IN BLOOD BY AUTOMATED COUNT: 21.1 % (ref 11.5–14.5)
EST. GFR  (NO RACE VARIABLE): 3.9 ML/MIN/1.73 M^2
FUNGUS SPEC CULT: NORMAL
GLUCOSE SERPL-MCNC: 101 MG/DL (ref 70–110)
HCT VFR BLD AUTO: 27.5 % (ref 40–54)
HGB BLD-MCNC: 8.7 G/DL (ref 14–18)
IMM GRANULOCYTES # BLD AUTO: 0.02 K/UL (ref 0–0.04)
IMM GRANULOCYTES NFR BLD AUTO: 0.3 % (ref 0–0.5)
LYMPHOCYTES # BLD AUTO: 1 K/UL (ref 1–4.8)
LYMPHOCYTES NFR BLD: 13.9 % (ref 18–48)
MAGNESIUM SERPL-MCNC: 2.2 MG/DL (ref 1.6–2.6)
MCH RBC QN AUTO: 31.6 PG (ref 27–31)
MCHC RBC AUTO-ENTMCNC: 31.6 G/DL (ref 32–36)
MCV RBC AUTO: 100 FL (ref 82–98)
MONOCYTES # BLD AUTO: 1.1 K/UL (ref 0.3–1)
MONOCYTES NFR BLD: 14.8 % (ref 4–15)
NEUTROPHILS # BLD AUTO: 4.9 K/UL (ref 1.8–7.7)
NEUTROPHILS NFR BLD: 64.8 % (ref 38–73)
NRBC BLD-RTO: 0 /100 WBC
PLATELET # BLD AUTO: 326 K/UL (ref 150–450)
PMV BLD AUTO: 9.2 FL (ref 9.2–12.9)
POCT GLUCOSE: 109 MG/DL (ref 70–110)
POTASSIUM SERPL-SCNC: 4.8 MMOL/L (ref 3.5–5.1)
PROT SERPL-MCNC: 8.2 G/DL (ref 6–8.4)
RBC # BLD AUTO: 2.75 M/UL (ref 4.6–6.2)
SODIUM SERPL-SCNC: 131 MMOL/L (ref 136–145)
WBC # BLD AUTO: 7.5 K/UL (ref 3.9–12.7)

## 2024-10-19 PROCEDURE — 25000003 PHARM REV CODE 250: Performed by: INTERNAL MEDICINE

## 2024-10-19 PROCEDURE — 25000003 PHARM REV CODE 250: Performed by: STUDENT IN AN ORGANIZED HEALTH CARE EDUCATION/TRAINING PROGRAM

## 2024-10-19 PROCEDURE — 86706 HEP B SURFACE ANTIBODY: CPT | Performed by: STUDENT IN AN ORGANIZED HEALTH CARE EDUCATION/TRAINING PROGRAM

## 2024-10-19 PROCEDURE — 90935 HEMODIALYSIS ONE EVALUATION: CPT

## 2024-10-19 PROCEDURE — 25000003 PHARM REV CODE 250: Performed by: SURGERY

## 2024-10-19 PROCEDURE — G0378 HOSPITAL OBSERVATION PER HR: HCPCS

## 2024-10-19 PROCEDURE — 87340 HEPATITIS B SURFACE AG IA: CPT | Performed by: STUDENT IN AN ORGANIZED HEALTH CARE EDUCATION/TRAINING PROGRAM

## 2024-10-19 PROCEDURE — 83735 ASSAY OF MAGNESIUM: CPT | Performed by: INTERNAL MEDICINE

## 2024-10-19 PROCEDURE — 63600175 PHARM REV CODE 636 W HCPCS: Performed by: INTERNAL MEDICINE

## 2024-10-19 PROCEDURE — 96376 TX/PRO/DX INJ SAME DRUG ADON: CPT

## 2024-10-19 PROCEDURE — 80053 COMPREHEN METABOLIC PANEL: CPT | Performed by: INTERNAL MEDICINE

## 2024-10-19 PROCEDURE — 85025 COMPLETE CBC W/AUTO DIFF WBC: CPT | Performed by: INTERNAL MEDICINE

## 2024-10-19 PROCEDURE — 96375 TX/PRO/DX INJ NEW DRUG ADDON: CPT

## 2024-10-19 RX ORDER — FAMOTIDINE 20 MG/1
20 TABLET, FILM COATED ORAL DAILY
Status: DISCONTINUED | OUTPATIENT
Start: 2024-10-19 | End: 2024-10-22 | Stop reason: HOSPADM

## 2024-10-19 RX ORDER — POLYETHYLENE GLYCOL 3350 17 G/17G
17 POWDER, FOR SOLUTION ORAL 2 TIMES DAILY
Status: DISCONTINUED | OUTPATIENT
Start: 2024-10-19 | End: 2024-10-22 | Stop reason: HOSPADM

## 2024-10-19 RX ADMIN — SEVELAMER CARBONATE 2400 MG: 800 TABLET, FILM COATED ORAL at 01:10

## 2024-10-19 RX ADMIN — POLYETHYLENE GLYCOL 3350 17 G: 17 POWDER, FOR SOLUTION ORAL at 05:10

## 2024-10-19 RX ADMIN — GABAPENTIN 100 MG: 100 CAPSULE ORAL at 01:10

## 2024-10-19 RX ADMIN — CALCITRIOL CAPSULES 0.25 MCG 0.25 MCG: 0.25 CAPSULE ORAL at 01:10

## 2024-10-19 RX ADMIN — SEVELAMER CARBONATE 2400 MG: 800 TABLET, FILM COATED ORAL at 07:10

## 2024-10-19 RX ADMIN — HYDRALAZINE HYDROCHLORIDE 100 MG: 25 TABLET ORAL at 10:10

## 2024-10-19 RX ADMIN — OXYCODONE HYDROCHLORIDE 20 MG: 10 TABLET ORAL at 05:10

## 2024-10-19 RX ADMIN — CALCIUM CARBONATE (ANTACID) CHEW TAB 500 MG 1000 MG: 500 CHEW TAB at 01:10

## 2024-10-19 RX ADMIN — OXYCODONE HYDROCHLORIDE 20 MG: 10 TABLET ORAL at 07:10

## 2024-10-19 RX ADMIN — HYDRALAZINE HYDROCHLORIDE 100 MG: 25 TABLET ORAL at 01:10

## 2024-10-19 RX ADMIN — CALCIUM CARBONATE (ANTACID) CHEW TAB 500 MG 1000 MG: 500 CHEW TAB at 10:10

## 2024-10-19 RX ADMIN — FAMOTIDINE 20 MG: 20 TABLET ORAL at 01:10

## 2024-10-19 RX ADMIN — HYDROMORPHONE HYDROCHLORIDE 1 MG: 1 INJECTION, SOLUTION INTRAMUSCULAR; INTRAVENOUS; SUBCUTANEOUS at 11:10

## 2024-10-19 RX ADMIN — METOPROLOL SUCCINATE 150 MG: 50 TABLET, FILM COATED, EXTENDED RELEASE ORAL at 01:10

## 2024-10-19 RX ADMIN — DIAZEPAM 5 MG: 5 INJECTION, SOLUTION INTRAMUSCULAR; INTRAVENOUS at 01:10

## 2024-10-19 RX ADMIN — ISOSORBIDE MONONITRATE 30 MG: 30 TABLET, EXTENDED RELEASE ORAL at 01:10

## 2024-10-19 RX ADMIN — OXYCODONE HYDROCHLORIDE 20 MG: 10 TABLET, FILM COATED, EXTENDED RELEASE ORAL at 10:10

## 2024-10-19 RX ADMIN — LOSARTAN POTASSIUM 100 MG: 50 TABLET, FILM COATED ORAL at 01:10

## 2024-10-19 RX ADMIN — SEVELAMER CARBONATE 2400 MG: 800 TABLET, FILM COATED ORAL at 05:10

## 2024-10-19 RX ADMIN — DIAZEPAM 5 MG: 5 INJECTION, SOLUTION INTRAMUSCULAR; INTRAVENOUS at 11:10

## 2024-10-19 RX ADMIN — ONDANSETRON 4 MG: 2 INJECTION INTRAMUSCULAR; INTRAVENOUS at 06:10

## 2024-10-19 RX ADMIN — SENNOSIDES AND DOCUSATE SODIUM 2 TABLET: 8.6; 5 TABLET ORAL at 10:10

## 2024-10-19 NOTE — SUBJECTIVE & OBJECTIVE
Interval History:  Patient was seen and examined at bedside this morning.  He is doing well at this time, however he seems frustrated.  He has frustrations at multiple levels the healthcare system with all that he has been through.  He will have dialysis today.  Working towards placement back at rehab facility from which he came.    Review of Systems   All other systems reviewed and are negative.    Objective:     Vital Signs (Most Recent):  Temp: 99.1 °F (37.3 °C) (10/19/24 0945)  Pulse: 97 (10/19/24 1030)  Resp: 18 (10/19/24 0945)  BP: (!) 163/71 (10/19/24 1030)  SpO2: 99 % (10/19/24 0945) Vital Signs (24h Range):  Temp:  [98.1 °F (36.7 °C)-99.1 °F (37.3 °C)] 99.1 °F (37.3 °C)  Pulse:  [] 97  Resp:  [16-20] 18  SpO2:  [95 %-99 %] 99 %  BP: ()/(62-85) 163/71     Weight: 120.5 kg (265 lb 10.5 oz)  Body mass index is 34.11 kg/m².    Intake/Output Summary (Last 24 hours) at 10/19/2024 1042  Last data filed at 10/18/2024 1758  Gross per 24 hour   Intake 480 ml   Output --   Net 480 ml         Physical Exam  Vitals and nursing note reviewed.   Constitutional:       Appearance: Normal appearance.   HENT:      Head: Normocephalic.      Nose: Nose normal.      Mouth/Throat:      Mouth: Mucous membranes are moist.   Eyes:      Pupils: Pupils are equal, round, and reactive to light.   Cardiovascular:      Rate and Rhythm: Normal rate.      Pulses: Normal pulses.      Heart sounds: Normal heart sounds.   Pulmonary:      Effort: Pulmonary effort is normal.      Breath sounds: Normal breath sounds.   Abdominal:      General: Abdomen is flat. Bowel sounds are normal.      Palpations: Abdomen is soft.      Comments: Colostomy bag in place    Musculoskeletal:         General: Normal range of motion.      Cervical back: Normal range of motion.   Skin:     General: Skin is warm.      Capillary Refill: Capillary refill takes less than 2 seconds.   Neurological:      General: No focal deficit present.      Mental Status:  He is alert and oriented to person, place, and time.   Psychiatric:         Mood and Affect: Mood normal.         Behavior: Behavior normal.             Significant Labs: All pertinent labs within the past 24 hours have been reviewed.  CBC:   Recent Labs   Lab 10/17/24  1923 10/19/24  1000   WBC 9.91 7.50   HGB 9.1* 8.7*   HCT 30.0* 27.5*    326     CMP:   Recent Labs   Lab 10/17/24  1923   *   K 5.0   CL 94*   CO2 29   GLU 92   BUN 22*   CREATININE 10.1*   CALCIUM 9.9   PROT 8.7*   ALBUMIN 3.9   BILITOT 0.5   ALKPHOS 92   AST 25   ALT 27   ANIONGAP 11       Significant Imaging: I have reviewed all pertinent imaging results/findings within the past 24 hours.    Imaging Results              CT Abdomen Pelvis With IV Contrast Routine Oral Contrast (Final result)  Result time 10/18/24 01:09:21      Final result by Jose Meier MD (10/18/24 01:09:21)                   Impression:      Postoperative change of prior partial colon resection with colostomy in the left paramidline abdomen.  Redemonstration of nonspecific moderate localized mesenteric fat stranding and ill-defined fluid within the lower anterior abdomen and pelvis as detailed above.  This may reflect evolving nonspecific peritoneal infectious or inflammatory postsurgical process.    Postoperative change of the left inguinal canal containing nonspecific heterogeneous fluid/material.  There is a possible small abscess within the left inguinal canal extending to the skin surface as detailed above.  Correlation with physical examination advised.    Midline surgical incision with decreased volume of ill-defined fluid.  Additional interval decreased size of right anterior abdominal fluid collection.    Additional findings as above.      Electronically signed by: Jose Meier MD  Date:    10/18/2024  Time:    01:09               Narrative:    EXAMINATION:  CT ABDOMEN PELVIS WITH IV CONTRAST    CLINICAL HISTORY:  Abdominal pain,  post-op;    TECHNIQUE:  Low dose axial images, sagittal and coronal reformations were obtained from the lung bases to the pubic symphysis following the IV administration of 100 cc of Visipaque 320 .  Oral contrast was not given.    COMPARISON:  CT examinations 10/02/2024, 09/25/2024    FINDINGS:  The lung bases are unremarkable.  There is no pleural fluid present.  The visualized portions of the heart appear normal.    The liver is normal in size and attenuation with no focal hepatic abnormality.  The gallbladder shows no evidence of stones or pericholecystic fluid.  There is no intra-or extrahepatic biliary ductal dilatation.    The stomach, spleen, pancreas, and adrenal glands are unremarkable.    The native kidneys are atrophic.  There is a stable left renal hypodensity.  No significant hydronephrosis.  The urinary bladder is unremarkable. The prostate demonstrates no significant abnormality.    The abdominal aorta is normal in course and caliber with mild atherosclerotic calcification along its course.    There is postoperative change of prior partial colon resection with left abdominal colostomy.  There is no evidence of small-bowel obstruction.  There is continued nonspecific mesenteric fat stranding and ill-defined fluid within the anterior lower abdomen and pelvis, similar to prior examination.  There is a small volume of ill-defined fluid centrally within this region measuring approximately 4.4 x 4.8 x 5.1 cm.  Previously demonstrated fluid collection deep to the anterior abdominal wall appears significantly improved/resolved from prior study.  There is scattered retained stool throughout the colon.  There is previously demonstrated postoperative change of the left inguinal canal.  There is nonspecific heterogeneous material/fluid within the left inguinal canal, similar to prior examination.  There is possible peripherally enhancing fluid collection measuring 1.4 x 2.5 cm within the anterior aspect of the  inguinal canal possibly communicating with the skin surface (for example axial series 4, image 240).  This could reflect a small abscess.  Correlation with physical examination advised.  There are scattered mildly prominent left inguinal lymph nodes, likely reactive in etiology.  There is a midline incision containing a small volume of ill-defined fluid, improved from prior examination.  Previously identified fluid collection within the right lateral anterior abdominal wall subcutaneous soft tissues appears decreased in size from prior study.  There are scattered nodular soft tissue densities within the anterior abdominal wall which may relate previous injection sites.  No large volume of free intraperitoneal air or portal venous gas identified.  There are scattered shotty mesenteric and retroperitoneal lymph nodes.  Minimal residual subcutaneous emphysema noted associated with the left anterior abdominal wall.    Osseous structures demonstrate degenerative change.                                       X-Ray Chest AP Portable (Final result)  Result time 10/17/24 17:29:10      Final result by Jono Donahue MD (10/17/24 17:29:10)                   Impression:      No acute cardiopulmonary process.      Electronically signed by: Jono Donahue  Date:    10/17/2024  Time:    17:29               Narrative:    EXAMINATION:  XR CHEST AP PORTABLE    CLINICAL HISTORY:  Sepsis;    TECHNIQUE:  Single frontal view of the chest was performed.    COMPARISON:  Radiographs 09/25/2024.    FINDINGS:  Cardiomediastinal silhouette is within normal limits.  Lungs are well expanded and clear.  No consolidation, pneumothorax, or pleural effusion.  No acute bony changes.

## 2024-10-19 NOTE — NURSING
"Patient requested I change out his ostomy device prior to dialysis. Swapped device without issue.  Patient returned, took meds and ate lunch. After he ate, patient called RN to room due to "colostomy being blocked."  Small pea sized stool noted in bag.  No other stool.  Abdomen is round and patient complains of cramping. Charge reached out to Dr. Wisdom regarding the above.  Dr. Wisdom asked for surgery recommendations. RN reached out to Dr Justice.  See orders.    "

## 2024-10-19 NOTE — PROGRESS NOTES
INPATIENT NEPHROLOGY Progress Note  [facility] NEPHROLOGY    João Ham  10/19/2024    Reason for consultation:  ESRD    Chief Complaint:   Chief Complaint   Patient presents with    Wound Infection     + wound culture for s/p hernia repair     Hyperkalemia     Is currently being dialyzed daily due to potassium level      History of Present Illness:  Per H and P    Patient is a 44 year old male with history of ESDR on dialysis MWF, awaiting kidney transplant, HTN, presented to ED with 3 days history of left groin pain and swelling. States swelling comes every time he cough and is being painful. Patient has low grade fever 99s at home. He has been vomiting bilious fluid last 2-3 days. No diarrhea or abdominal pain.      In the ED CT abdomen showed Left inguinal hernia containing fat and air as well as marked inflammation extending from inside the pelvis, in the hernia and down into the left scrotum. This is consistent with an infected inguinal hernia, possible gangrene. WBC was 14, patient was tachycardic. General surgery was consulted and patient was admitted under hospitalist.     9/19  avf nonfunctional.   No sob or chest pain.  Has post op pain.  AFVSS  9/20  afvss.  Pt doesn't want to see previous vascular surgeon who put his avf in.  No alternative available.  Transfer center called.  Has temporary trialysis catheter.  Patient seen on hemodialysis for uremic clearance and ultrafiltration.    9/21  2.5L off w/HD yesterday.  Tmax 101.8, pressures ok.  Lab results reviewed, Hgb low but better than yesterday.  C/o post op pain, no other complaints.  9/22 POD 5.  VSS, lab results reviewed.  Hgb 7.6, added epo to HD orders.  C/o gas pains, plans to move around more today.  Next HD tomorrow.  9/23 VSS. HD today. Transfuse with next HD as needed if Hgb stil low.  9/24 VSS. Appreciate input from Urology, Gen Surgery, Vascular Surgery, ID on this complex patient. Plan for fistulogram tomorrow. CXR yesterday shows  trialysis line tip in appropriate cocation. Will attempt HD today since we skipped yesterday due to nurse and patient concerns that the line may be malpositioned.  9/25 VSS. s/p cephalic vein dilation by Dr. Robledo yesterday, tolerated HD very well. Hold HD today. Hypotension this AM, low grade fever yesterday, WBC elevated, received IVF bolus and Midodrine. Continues to be infected per Surgery, we can remove trialysis line now that AVF is treated and usable... Consider ICU. Stopped hydralazine and olmesartan, decreased Clonidine to 0.1 BID, not sure what to do with Metoprolol 150mg and Hydralazine 120mg... He may be sob/hypoxic due to anemia, suggest 1 PRBC instead of IVF, since Hgb is 7.    Addendum @ 10:47 AM  Seen at bedside in ICU with Dr. Rodriguez and Dr. Au. Mother present as well. Reviewed imaging. Agree with plan for urgent ex lap, remove central line and place mid line, keep current abx, hold on transfusion and dialysis and reassess later. ABG and fresh set of cx now, re-evalaute later.    Addendum @ 4:36 PM  Discussed briefly with Dr. Connors, dialysis nurse Brant and with ICU nurse. Patient had colostomy placed due to anastomotic leak and had 2 PRBC given. Upon return to floor was hypertensive with SBP > 200 and NPO. Advised Cleviprex drip for now (earlier to day he was on max dose Clonidine, mad dose Hydralazine, max dose Olmesartan, 150mg Metoprolol-XL and 120mg of Imdur - none of which he can get due to NPO) and will do urgent HD with UF 2L or so as tolerated.  9/26  POD 1 s/p . Exploratory laparotomy,. Colon resection end-colostomy, Mobilization of splenic flexure, and left inguinal canal exploration.  Post pain. No sob.  Sleepy.    9/27  AFVSS.  Some post op pain.  No sob.  No angina.   Patient seen on hemodialysis for uremic clearance and ultrafiltration.    9/28 s/p 3.8 liter uf yesterday.     AFVSS.  Post op pain.  Dry cough.  No sob.  Projectile vomiting earlier today per patient  9/30 VSS. HD  today.  10/1 GS note reviewed, working on pain control, tolerating diet  10/2 no major events overnight- due for HD today  10/3 fever, tachycardia, abd pain and stagnant leukocytosis yest- CT with possible fluid collection- may need IR drainage- remains on IV antbx  10/4 fluid collection neg for infection- working on discharge plan to NSR today- will do HD before discharge- pain control ongoing issue- hospital medicine aware  10/5  Plan is now d/c to rehab tomorrow, working on pain control.  2.5L UF w/HD yesterday.  VSS, lab results reviewed.  Added on PO4 level to labs--hasn't had one for several days--resulted at 8.5, so increased renvela dose.  Notified pt that dosing may change based on lab results.  10/6 VSS. OK to dc to rehab.    10/19  seen today on dialysis.  Feels weak and overwhelmed.  States he has lost about 20 lbs, so unsure of dry wt at this time.  Dialysis nurse to UF as tolerated, will monitor BP closely.    Plan of Care:    ESRD on HD MWF--hold today.  Pt just had 3 days in a row  --continue dialysis per routine    Anemia of CKD  --Hb drop- bump LOR with HD today  --transfused this admission- no acute transfusion needs today    Secondary HPT  --renal diet  --continue binders with meals    Hypertension  --continue current BP meds  --uf with hd as needed    Hyponatremia  Hyperkalemia  Acidosis  --adjust dialysate  --renal diet    AVF malfunction fixed  --appreciate Vascular eval, s/p fistulogram and cephalic vein stenosis dilation on 9/23 by Dr. Salvador.    Inguinal hernia of left side with gangrene - W/ Colonic perforation with abscess   --S/P Robotic sigmoid resection, Mobilization of splenic flexure, left inguinal canal exploration, and I&D 9/17 per Dr. Connors   --dose antbx for CrCl < 10/HD  --CT 10/2 with new fluid collection- IR aspiration neg for infection    Thank you for allowing us to participate in this patient's care. We will continue to follow.    Vital Signs:  Temp Readings from Last 3  Encounters:   10/19/24 98.2 °F (36.8 °C) (Oral)   10/06/24 97.5 °F (36.4 °C) (Oral)   08/22/24 97.3 °F (36.3 °C) (Temporal)       Pulse Readings from Last 3 Encounters:   10/19/24 (!) 118   10/06/24 86   08/29/24 (!) 115       BP Readings from Last 3 Encounters:   10/19/24 130/85   10/06/24 102/66   08/29/24 99/68       Weight:  Wt Readings from Last 3 Encounters:   10/18/24 120.5 kg (265 lb 10.5 oz)   09/18/24 134 kg (295 lb 6.7 oz)   08/29/24 131.3 kg (289 lb 7.4 oz)       Medications:  No current facility-administered medications on file prior to encounter.     Current Outpatient Medications on File Prior to Encounter   Medication Sig Dispense Refill    acetaminophen (TYLENOL) 325 MG tablet Take 2 tablets (650 mg total) by mouth every 4 (four) hours as needed.      ALPRAZolam (XANAX) 0.25 MG tablet Take 1 tablet (0.25 mg total) by mouth nightly as needed for Insomnia.      aluminum-magnesium hydroxide-simethicone (MAALOX) 200-200-20 mg/5 mL Susp Take 30 mLs by mouth 4 (four) times daily as needed.      calcitRIOL (ROCALTROL) 0.25 MCG Cap Take 1 capsule (0.25 mcg total) by mouth once daily.      calcium carbonate (TUMS) 200 mg calcium (500 mg) chewable tablet Take 2 tablets (1,000 mg total) by mouth 3 (three) times daily. 180 tablet 11    cloNIDine (CATAPRES) 0.3 MG tablet Take 1 tablet (0.3 mg total) by mouth 3 (three) times daily as needed (SBP > 150).      epoetin mily-epbx (RETACRIT) 20,000 unit/mL injection Inject 0.67 mLs (13,400 Units total) into the skin every Mon, Wed, Fri.      gabapentin (NEURONTIN) 100 MG capsule Take 1 capsule (100 mg total) by mouth 3 (three) times daily.      heparin sodium,porcine (HEPARIN, PORCINE,) 1,000 unit/mL injection Inject 4 mLs (4,000 Units total) into the vein as needed (line care after dialysis).      heparin sodium,porcine (HEPARIN, PORCINE,) 5,000 unit/mL injection Inject 1 mL (5,000 Units total) into the skin every 8 (eight) hours.      hydrALAZINE (APRESOLINE) 100 MG  tablet Take 1 tablet (100 mg total) by mouth every 8 (eight) hours. Hold for SBP < 120 and before dialysis      HYDROmorphone (DILAUDID) 4 MG tablet Take 1 tablet (4 mg total) by mouth every 4 (four) hours as needed for Pain.      insulin aspart U-100 (NOVOLOG) 100 unit/mL (3 mL) InPn pen Inject 0-5 Units into the skin before meals and at bedtime as needed (Hyperglycemia).      isosorbide mononitrate (IMDUR) 30 MG 24 hr tablet Take 1 tablet (30 mg total) by mouth once daily.      ketoconazole (NIZORAL) 2 % shampoo Apply topically every other day.      LIDOcaine-prilocaine (EMLA) cream Apply topically as needed (pre dialysis).      melatonin (MELATIN) 3 mg tablet Take 2 tablets (6 mg total) by mouth nightly as needed for Insomnia.      metoprolol succinate (TOPROL-XL) 50 MG 24 hr tablet Take 150 mg by mouth once daily.      miconazole (MICOTIN) 2 % cream Apply topically 2 (two) times daily.      olmesartan (BENICAR) 40 MG tablet Take 1 tablet by mouth once daily (Patient taking differently: Take 40 mg by mouth once daily.) 90 tablet 0    ondansetron (ZOFRAN-ODT) 4 MG TbDL Take 1 tablet (4 mg total) by mouth every 6 (six) hours as needed (nausea).      oxyCODONE (OXYCONTIN) 10 mg 12 hr tablet Take 1 tablet (10 mg total) by mouth every 12 (twelve) hours.      oxymetazoline (AFRIN) 0.05 % nasal spray 1 spray by Nasal route 2 (two) times daily.      sevelamer carbonate (RENVELA) 800 mg Tab Take 2 tablets (1,600 mg total) by mouth 3 (three) times daily with meals. 180 tablet 11    simethicone (MYLICON) 80 MG chewable tablet Take 1 tablet (80 mg total) by mouth 3 (three) times daily as needed for Flatulence.       Scheduled Meds:   0.9% NaCl   Intravenous Once    calcitRIOL  0.25 mcg Oral Daily    calcium carbonate  1,000 mg Oral TID    famotidine  20 mg Oral Daily    gabapentin  100 mg Oral TID    hydrALAZINE  100 mg Oral Q8H    HYDROmorphone  1 mg Intravenous Once    isosorbide mononitrate  30 mg Oral Daily    losartan   "100 mg Oral Daily    metoprolol succinate  150 mg Oral Daily    oxyCODONE  20 mg Oral Q12H    senna-docusate 8.6-50 mg  2 tablet Oral BID    sevelamer carbonate  2,400 mg Oral TID WM     Continuous Infusions:        PRN Meds:.  Current Facility-Administered Medications:     0.9% NaCl, , Intravenous, PRN    acetaminophen, 650 mg, Oral, Q8H PRN    acetaminophen, 650 mg, Oral, Q4H PRN    aluminum-magnesium hydroxide-simethicone, 30 mL, Oral, QID PRN    cloNIDine, 0.3 mg, Oral, TID PRN    dextrose 50%, 12.5 g, Intravenous, PRN    dextrose 50%, 25 g, Intravenous, PRN    diazePAM, 5 mg, Intravenous, Q6H PRN    glucagon (human recombinant), 1 mg, Intramuscular, PRN    glucose, 16 g, Oral, PRN    glucose, 24 g, Oral, PRN    heparin (porcine), 5,000 Units, Intravenous, PRN    HYDROmorphone, 1 mg, Intravenous, Q6H PRN    magnesium oxide, 800 mg, Oral, PRN    magnesium oxide, 800 mg, Oral, PRN    melatonin, 6 mg, Oral, Nightly PRN    naloxone, 0.02 mg, Intravenous, PRN    ondansetron, 4 mg, Intravenous, Q6H PRN    oxyCODONE, 20 mg, Oral, Q6H PRN    potassium bicarbonate, 35 mEq, Oral, PRN    potassium bicarbonate, 50 mEq, Oral, PRN    potassium bicarbonate, 60 mEq, Oral, PRN    potassium, sodium phosphates, 2 packet, Oral, PRN    potassium, sodium phosphates, 2 packet, Oral, PRN    potassium, sodium phosphates, 2 packet, Oral, PRN    sevelamer carbonate, 1,600 mg, Oral, With Snacks    sodium chloride 0.9%, 250 mL, Intravenous, PRN    sodium chloride 0.9%, 3 mL, Intravenous, Q12H PRN    Review of Systems:  Neg    Physical Exam:    /85   Pulse (!) 118   Temp 98.2 °F (36.8 °C) (Oral)   Resp 18   Ht 6' 2" (1.88 m)   Wt 120.5 kg (265 lb 10.5 oz)   SpO2 99%   BMI 34.11 kg/m²     General Appearance:    Alert, cooperative, no distress, appears stated age   Head:    Normocephalic, without obvious abnormality, atraumatic   Eyes:    PER, conjunctiva/corneas clear, EOM's intact in both eyes        Throat:   Lips, mucosa, and " tongue normal; teeth and gums normal   Back:     Symmetric, no curvature, ROM normal, no CVA tenderness   Lungs:     Clear to auscultation bilaterally, respirations unlabored   Chest wall:    No tenderness or deformity   Heart:    Regular rate and rhythm, S1 and S2 normal, no murmur, rub   or gallop   Abdomen:     Soft, non-tender, bowel sounds active all four quadrants,     no masses, no organomegaly   Extremities:   Extremities normal, atraumatic, no cyanosis or edema   Pulses:   2+ and symmetric all extremities   MSK:   No joint or muscle swelling, tenderness or deformity   Skin:   Skin color, texture, turgor normal, no rashes or lesions   Neurologic:   CNII-XII intact, normal strength and sensation       Throughout.  No flap     Results:  Recent Labs   Lab 10/17/24  1923   *   K 5.0   CL 94*   CO2 29   BUN 22*   CREATININE 10.1*   GLU 92       Recent Labs   Lab 10/17/24  1923   CALCIUM 9.9   ALBUMIN 3.9       Recent Labs   Lab 02/02/23  0745 05/19/23  1022 06/19/23  1031   PTH, Intact 225.6 H 335.8 H 319.0 H       Recent Labs   Lab 10/18/24  2009 10/19/24  0729   POCTGLUCOSE 117* 109         Recent Labs   Lab 10/10/22  2005 06/22/23  0446 07/16/24  0510   Hemoglobin A1C 5.5 5.5 6.1       Recent Labs   Lab 10/17/24  1923   WBC 9.91   HGB 9.1*   HCT 30.0*      *   MCHC 30.3*   MONO 13.3  1.3*   EOSINOPHIL 3.7       Recent Labs   Lab 10/17/24  1923   BILITOT 0.5   PROT 8.7*   ALBUMIN 3.9   ALKPHOS 92   ALT 27   AST 25       Recent Labs   Lab 10/10/22  1645 01/09/23  1123 06/19/23  1622 07/17/24  0505 09/19/24  1435   Color, UA Yellow   < > Straw Yellow Yellow   Appearance, UA Clear   < > Clear Hazy A Clear   pH, UA 6.0   < > 6.0 6.0 8.0   Specific Gravity, UA 1.025   < > 1.010 1.020 1.010   Protein, UA 2+ A   < > 2+ A 3+ A 2+ A   Glucose, UA Negative   < > Negative Negative Negative   Ketones, UA Negative   < > Negative Negative Negative   Urobilinogen, UA Negative  --   --  Negative Negative    Bilirubin (UA) Negative   < > Negative Negative Negative   Occult Blood UA 1+ A   < > Negative 2+ A 2+ A   Nitrite, UA Negative   < > Negative Negative Negative   RBC, UA 1   < > 0 6 H 4   WBC, UA 0   < > 2 52 H 21 H   Bacteria None   < > None None None   Hyaline Casts, UA 0   < > 0 0 0    < > = values in this interval not displayed.       Recent Labs   Lab 09/17/24  0928 09/25/24  1051 10/17/24  1923   POC PH  --  7.404  --    POC PCO2  --  37.7  --    POC HCO3  --  23.6 L  --    POC PO2  --  87  --    POC SATURATED O2  --  97  --    POC BE  --  -1  --    Sample VENOUS ARTERIAL VENOUS       Microbiology Results (last 7 days)       Procedure Component Value Units Date/Time    Blood culture x two cultures. Draw prior to antibiotics. [[id number]] Collected: 10/17/24 1853    Order Status: Completed Specimen: Blood from Peripheral, Antecubital, Left Updated: 10/18/24 2032     Blood Culture, Routine No Growth to date      No Growth to date    Narrative:      Aerobic and anaerobic  Collection has been rescheduled by AJO at 10/17/2024 18:16 Reason:    in room said to come back  Collection has been rescheduled by AJO at 10/17/2024 18:16 Reason:    in room said to come back    Blood culture x two cultures. Draw prior to antibiotics. [[id number]] Collected: 10/17/24 1943    Order Status: Completed Specimen: Blood from Peripheral, Hand, Left Updated: 10/18/24 2032     Blood Culture, Routine No Growth to date      No Growth to date    Narrative:      Aerobic and anaerobic  Collection has been rescheduled by AJO at 10/17/2024 18:16 Reason:    in room said to come back  Collection has been rescheduled by AJO at 10/17/2024 18:56 Reason: Pt   refusing to be stuck in hand. Come back later for second set.   Collection has been rescheduled by AJO at 10/17/2024 19:40 Reason: RN   called and said she collected the second set  Collection has been rescheduled by AJO at 10/17/2024 18:16 Reason:    in room said to come  back  Collection has been rescheduled by AJO at 10/17/2024 18:56 Reason: Pt   refusing to be stuck in hand. Come back later for second set.   Collection has been rescheduled by AJO at 10/17/2024 19:40 Reason: RN   called and said she collected the second set          Patient care time was spent personally by me on the following activities: > 35 minutes  Obtaining a history.  Examination of patient.  Providing medical care at the patients bedside.  Developing a treatment plan with patient or surrogate and bedside caregivers.  Ordering and reviewing laboratory studies, radiographic studies, pulse oximetry.  Ordering and performing treatments and interventions.  Evaluation of patient's response to treatment.  Discussions with consultants while on the unit and immediately available to the patient.  Re-evaluation of the patient's condition.  Documentation in the medical record.      Kellie Doty NP    [facility] [place]  [phone]

## 2024-10-19 NOTE — ASSESSMENT & PLAN NOTE
Patient was seen and evaluated by General surgery, they recommended no further intervention at this time  Zosyn is stopped   Afebrile, hemodynamically stable, WBC normal; no signs or symptoms of infection at this time  Continue wound care  Continue pain control as needed

## 2024-10-19 NOTE — ASSESSMENT & PLAN NOTE
Has been seen and evaluated by Nephrology   Did not require dialysis yesterday as determined by Nephrology; schedule his normally Monday Wednesday Friday   Patient will dialyze today

## 2024-10-19 NOTE — PROGRESS NOTES
[facility]  Adult Nutrition   Progress Note (Initial Assessment)     SUMMARY     Recommendations  1.) Liberalize diet to diabetic to allow more food options to encourage intake.   2.) If PO intakes continue to remain poor, suggest regular diet and close monitoring of K+ and Phos levels.   3.) Continue Renvela 2400mg with meals.   4.) sHPT: continue calcitriol as ordered. Monitor PTH monthly and adjust vitamin D analogs as needed.   5.) Suggest renal MVI to aid in replacing vitamins lost in dialysis.  6.) Pt meets ASPEN criteria for severe malnutrition    Goals:   1.) Pt to consume/tolerate >75% of meals by RD follow up.   2.) Progression of wound healing during admission.  Nutrition Goal Status: new  Communication of RD Recs: reviewed with physician    Nutrition Diagnosis PES Statement:   Malnutrition Type:  Context: acute illness or injury, social/environmental circumstances  Level: severe     Related to (etiology):   Prolonged catabolic needs     Signs and Symptoms (as evidenced by):   Energy intake: pt consuming <50% of estimated nutritional needs >1 month  Weight Loss: 10% weight loss x 1 month      Malnutrition Characteristic Summary:  Weight Loss (Malnutrition): greater than 5% in 1 month  Energy Intake (Malnutrition): less than or equal to 50% for greater than or equal to 1 month        Interventions/Recommendations (treatment strategy):  1.) Liberalize diet to diabetic to allow more food options to encourage intake. 2.) If PO intakes continue to remain poor, suggest regular diet and close monitoring of K+ and Phos levels. 3.) Continue Renvela 2400mg with meals. 4.) sHPT: continue calcitriol as ordered. Monitor PTH monthly and adjust vitamin D analogs as needed. 5.) Suggest renal MVI to aid in replacing vitamins lost in dialysis. 6.) Pt meets ASPEN criteria for severe malnutrition     Nutrition Diagnosis Status:   New       Dietitian Rounds Brief  Received call from nurse that pt was not happy  "with his food and wanting a different meal. Upon further review of EMR, noted extensive wounds. Called and spoke to pt via phone who reports poor appetite over the past few weeks. He has been residing in rehab facility and was suppose to be d/c however was readmitted to [facility] with infection. He also endorses 20# wt loss. Wt reviewed. UBW 134kg (9/2024), .5kg reflecting a severe wt loss of 10% x1 month. Offered various meal options d/t renal diet restrictions however pt reports "food is disgusting". Secure chat to MD to liberalize diet to diabetic to allow more food options which MD agreed. Offered James to pt, however he denied this at this time d/t poor appetite. Skin: see media for photos. D/t poor intake and weight loss, pt meets ASPEN criteria for acute severe PCM.    Nutrition Related Social Determinants of Health: SDOH: None Identified    Malnutrition Assessment  Malnutrition Context: acute illness or injury, social/environmental circumstances  Malnutrition Level: severe  Skin (Micronutrient): wounds unhealed       Weight Loss (Malnutrition): greater than 5% in 1 month  Energy Intake (Malnutrition): less than or equal to 50% for greater than or equal to 1 month       Diet order:   Renal diet    Evaluation of Received Nutrient/Fluid Intake  Energy Calories Required: not meeting needs  Protein Required: not meeting needs  Fluid Required: meeting needs  Tolerance: tolerating     % Intake of Estimated Energy Needs: 0 - 25 %  % Meal Intake: 0 - 25 %      Intake/Output Summary (Last 24 hours) at 10/19/2024 1424  Last data filed at 10/19/2024 1315  Gross per 24 hour   Intake 980 ml   Output 2500 ml   Net -1520 ml        Anthropometrics  Temp: 98.5 °F (36.9 °C)  Height Method: Stated  Height: 6' 2" (188 cm)  Height (inches): 74 in  Weight Method: Bed Scale  Weight: 120.5 kg (265 lb 10.5 oz)  Weight (lb): 265.66 lb  Ideal Body Weight (IBW), Male: 190 lb  % Ideal Body Weight, Male (lb): 139.82 %  BMI (Calculated): " 34.1  BMI Grade: 30 - 34.9- obesity - grade I  Weight Loss: unintentional  Usual Body Weight (UBW), k kg (2024)  % Usual Body Weight: 90.11  % Weight Change From Usual Weight: -10.08 %       Estimated/Assessed Needs  Weight Used For Calorie Calculations: 86 kg (189 lb 9.5 oz) (IBW d/r renal disease)  Energy Calorie Requirements (kcal): 0952-0420  Energy Need Method: Kcal/kg (25-30)  Protein Requirements: 103-121 (1.2-1.4)  Weight Used For Protein Calculations: 86 kg (189 lb 9.5 oz) (IBW d/t renal disease)  Fluid Requirements (mL): 1000 (+ urine output)     RDA Method (mL): 2150       Reason for Assessment  Reason For Assessment: identified at risk by screening criteria  Diagnosis: surgery/postoperative complications  Nutrition Discharge Planning: Liberalized diet to encourage weight maintainence.    Nutrition/Diet History  Spiritual, Cultural Beliefs, Roman Catholic Practices, Values that Affect Care: no  Food Allergies: other (see comments) (mushroom)  Factors Affecting Nutritional Intake: abdominal distension, decreased appetite    Nutrition Risk Screen  Nutrition Risk Screen: large or nonhealing wound, burn or pressure injury       Colostomy 24 1200 LLQ-Wound Image: Images linked       Wound 24 1415 Incision midline Abdomen midline;vertical-Wound Image: Images linked  MST Score: 0  Have you recently lost weight without trying?: No  Weight loss score: 0  Have you been eating poorly because of a decreased appetite?: No  Appetite score: 0       Weight History:  Wt Readings from Last 10 Encounters:   10/19/24 120.5 kg (265 lb 10.5 oz)   24 134 kg (295 lb 6.7 oz)   24 131.3 kg (289 lb 7.4 oz)   24 131 kg (288 lb 12.8 oz)   24 131.1 kg (289 lb 0.4 oz)   24 129.3 kg (285 lb)   24 128.3 kg (282 lb 13.6 oz)   24 129.3 kg (285 lb 0.9 oz)   24 129.3 kg (285 lb)   24 133 kg (293 lb 3.4 oz)        Lab/Procedures/Meds: Pertinent Labs/Meds  Reviewed    Medications:Pertinent Medications Reviewed  Scheduled Meds:   0.9% NaCl   Intravenous Once    calcitRIOL  0.25 mcg Oral Daily    calcium carbonate  1,000 mg Oral TID    famotidine  20 mg Oral Daily    gabapentin  100 mg Oral TID    hydrALAZINE  100 mg Oral Q8H    HYDROmorphone  1 mg Intravenous Once    isosorbide mononitrate  30 mg Oral Daily    losartan  100 mg Oral Daily    metoprolol succinate  150 mg Oral Daily    oxyCODONE  20 mg Oral Q12H    senna-docusate 8.6-50 mg  2 tablet Oral BID    sevelamer carbonate  2,400 mg Oral TID WM     Continuous Infusions:  PRN Meds:.  Current Facility-Administered Medications:     0.9% NaCl, , Intravenous, PRN    acetaminophen, 650 mg, Oral, Q8H PRN    acetaminophen, 650 mg, Oral, Q4H PRN    aluminum-magnesium hydroxide-simethicone, 30 mL, Oral, QID PRN    cloNIDine, 0.3 mg, Oral, TID PRN    dextrose 50%, 12.5 g, Intravenous, PRN    dextrose 50%, 25 g, Intravenous, PRN    diazePAM, 5 mg, Intravenous, Q6H PRN    glucagon (human recombinant), 1 mg, Intramuscular, PRN    glucose, 16 g, Oral, PRN    glucose, 24 g, Oral, PRN    heparin (porcine), 5,000 Units, Intravenous, PRN    HYDROmorphone, 1 mg, Intravenous, Q6H PRN    magnesium oxide, 800 mg, Oral, PRN    magnesium oxide, 800 mg, Oral, PRN    melatonin, 6 mg, Oral, Nightly PRN    naloxone, 0.02 mg, Intravenous, PRN    ondansetron, 4 mg, Intravenous, Q6H PRN    oxyCODONE, 20 mg, Oral, Q6H PRN    potassium bicarbonate, 35 mEq, Oral, PRN    potassium bicarbonate, 50 mEq, Oral, PRN    potassium bicarbonate, 60 mEq, Oral, PRN    potassium, sodium phosphates, 2 packet, Oral, PRN    potassium, sodium phosphates, 2 packet, Oral, PRN    potassium, sodium phosphates, 2 packet, Oral, PRN    sevelamer carbonate, 1,600 mg, Oral, With Snacks    sodium chloride 0.9%, 250 mL, Intravenous, PRN    sodium chloride 0.9%, 3 mL, Intravenous, Q12H PRN    Labs: Pertinent Labs Reviewed  Clinical Chemistry:  Recent Labs   Lab 10/17/24  1923  10/19/24  1000   * 131*   K 5.0 4.8   CL 94* 93*   CO2 29 22*   GLU 92 101   BUN 22* 37*   CREATININE 10.1* 14.5*   CALCIUM 9.9 9.7   PROT 8.7* 8.2   ALBUMIN 3.9 3.7   BILITOT 0.5 0.4   ALKPHOS 92 81   AST 25 17   ALT 27 21   ANIONGAP 11 16   MG  --  2.2     CBC:   Recent Labs   Lab 10/19/24  1000   WBC 7.50   RBC 2.75*   HGB 8.7*   HCT 27.5*      *   MCH 31.6*   MCHC 31.6*     Diabetes:  Recent Labs   Lab 10/18/24  2009 10/19/24  0729   POCTGLUCOSE 117* 109       Monitor and Evaluation  Food and Nutrient Intake: energy intake, food and beverage intake  Food and Nutrient Adminstration: diet order  Knowledge/Beliefs/Attitudes: food and nutrition knowledge/skill  Physical Activity and Function: nutrition-related ADLs and IADLs  Anthropometric Measurements: weight, weight change  Biochemical Data, Medical Tests and Procedures: electrolyte and renal panel, gastrointestinal profile, glucose/endocrine profile  Nutrition-Focused Physical Findings: overall appearance     Nutrition Risk  Level of Risk/Frequency of Follow-up:  (2x/week)     Nutrition Follow-Up  RD Follow-up?: Yes    Angie Baker RD 10/19/2024 2:24 PM

## 2024-10-19 NOTE — PROGRESS NOTES
[facility]  Progress Note    Patient Name: João Ham  MRN: [id number]  Patient Class: OP- Observation   Admission Date: 10/17/2024  Length of Stay: 0 days  Attending Physician: Pierce Wisdom DO  Primary Care Provider: Dawson Granado MD        Subjective:     Principal Problem:Post-operative wound abscess        HPI:   44-year-old man with an unfortunate history of uninfected inguinal hernia mesh with gangrene resulting in complicated wound with wound VAC placement and colostomy.  He also has a history of end-stage renal disease on hemodialysis and presents to the emergency department today for multiple issues.  He had a culture obtained from his left groin wound where the wound VAC is in place on 10/15 that came back positive today for Gram-negative rods.  His right arm MID line was clogged and was forcefully being flushed according to the patient and left him with right shoulder and arm pain subsequently.  He wants the midline removed and another 1 placed.  He states his potassium has been high and he had to receive dialysis yesterday but then again today.  He believes this is due to the healthcare workers at the rehabilitation facility not administering his phosphorus binding medications prior to eating.       Non smoker  Non drinker    Currently living in rehab facility       DC summary from our service 10/6/2024    Patient is a 44 year old male with history of ESRD, was admitted with suspected inguinal hernia gangrene. General surgery was consulted and patient was taken to OR. Found to have colonic perforation due to mesh erosion with an abscess. Patient underwent sigmoid resection and drainage of the abscess, was started on clindamycin, Vancomycin and cefepime. Nephrology was consulted for chronic dialysis. AVF was not functioning, General surgery consulted, trialysis catheter placed. PRBC transfused during dialysis for low Hb 6.6.  While on broad-spectrum coverage,  patient is spiking fevers and worsening leukocytosis, id consulted, discontinued clindamycin, vancomycin and cefepime-added daptomycin, Diflucan and Zosyn.  Repeated CT abdomen and pelvis that showed residual abscess/phlegmon and contained perforation. Re-consulted surgery who mentioned likely post op changes. Fistulogram 9/24 by vascular surgery for declotting the AV fistula. Trialysis removed 9/25. Had worsening white count, fevers upto 103.5 and hypotension overnight 9/25.  Also had hemoglobin of 7, with worsening hypoxia up to 7 L (overnight desaturated to 70% with lethargy requiring BiPAP placement), after which nephrology recommended 1 unit PRBC transfusion.  Id, Nephrology, Urology, General surgery were updated about his worsening condition.We CT repeated that showed intraperitoneal free air and findings suggestive of necrotizing fasciitis, general surgery was consulted stat, patient was transferred to ICU.  The surgeon performed open laparotomy and noted fecal contamination of the left lower quadrant indicative of anastomotic disruption.  Colon was resected, and colostomy was created. Patient was placed on full liquid diet, was unable to tolerate and downgraded back to clear liquid diet.  Also complaining of excess mucus and cough.  Patient encouraged to use incentive spirometer as directed, and press pillow to abdomen when coughing.  Pulmonology signed off on 09/29 with recommendation to continue BiPAP nightly and with naps.  PT/OT recommended high-intensity therapy due to patient's deconditioning and discharge planning was started.  Pain regimen was adjusted.  Tachycardia and stagnant white blood cell count noted with low-grade temps-a CT abdomen and pelvis was pursued on 10/02.  This was revealing for 2 new fluid collections.  These were discussed with general surgery and IR.  IR did do an aspirate of the collection they felt easily accessible, this was negative on Gram stain with cultures pending.  Did  not feel either fluid collection with rim enhancing or indicative of an abscess.  General surgery agreed.  Leukocytosis improved.  Temperature is improved.  He had issues with ongoing pain control and long-acting pain regimen was added. Patient cleared for discharge from surgical standpoint with f/u in 1-2 weeks. BERNABE drain removed and surgical site CDI. Patient assessed on day of discharge and stable for discharge. Some overnight epistaxis reported but patient also reported that he was picking and blowing his nose a lot. Educated on nose bleeds and education included in discharge instructions. Patient also instructed to only use Afrin 1-2 times in a 24 hour period and not to exceed 3 days. This was also included in discharge instructions. Patient will continue abx thru 10/9 and then have midline removed. He is to f/u with ID in 3 weeks. He is also to f/u with urology in 2 weeks. Plan discussed with patient and he is agreeable and ready for discharge.               Impression: CT scan from [facility] in ER      Postoperative change of prior partial colon resection with colostomy in the left paramidline abdomen.  Redemonstration of nonspecific moderate localized mesenteric fat stranding and ill-defined fluid within the lower anterior abdomen and pelvis as detailed above.  This may reflect evolving nonspecific peritoneal infectious or inflammatory postsurgical process.     Postoperative change of the left inguinal canal containing nonspecific heterogeneous fluid/material.  There is a possible small abscess within the left inguinal canal extending to the skin surface as detailed above.  Correlation with physical examination advised.     Midline surgical incision with decreased volume of ill-defined fluid.  Additional interval decreased size of right anterior abdominal fluid collection.        His WBC was normal   No left shift    Lactic acid normal     CMP normal for ESRD     Potassium is 5         Plan is to admit to our  service to address the findings on CT scan , his pain and any HD access issues with nephrology and General Surgery        Overview/Hospital Course:  No notes on file    Interval History:  Patient was seen and examined at bedside this morning.  He is doing well at this time, however he seems frustrated.  He has frustrations at multiple levels the healthcare system with all that he has been through.  He will have dialysis today.  Working towards placement back at rehab facility from which he came.    Review of Systems   All other systems reviewed and are negative.    Objective:     Vital Signs (Most Recent):  Temp: 99.1 °F (37.3 °C) (10/19/24 0945)  Pulse: 97 (10/19/24 1030)  Resp: 18 (10/19/24 0945)  BP: (!) 163/71 (10/19/24 1030)  SpO2: 99 % (10/19/24 0945) Vital Signs (24h Range):  Temp:  [98.1 °F (36.7 °C)-99.1 °F (37.3 °C)] 99.1 °F (37.3 °C)  Pulse:  [] 97  Resp:  [16-20] 18  SpO2:  [95 %-99 %] 99 %  BP: ()/(62-85) 163/71     Weight: 120.5 kg (265 lb 10.5 oz)  Body mass index is 34.11 kg/m².    Intake/Output Summary (Last 24 hours) at 10/19/2024 1042  Last data filed at 10/18/2024 1758  Gross per 24 hour   Intake 480 ml   Output --   Net 480 ml         Physical Exam  Vitals and nursing note reviewed.   Constitutional:       Appearance: Normal appearance.   HENT:      Head: Normocephalic.      Nose: Nose normal.      Mouth/Throat:      Mouth: Mucous membranes are moist.   Eyes:      Pupils: Pupils are equal, round, and reactive to light.   Cardiovascular:      Rate and Rhythm: Normal rate.      Pulses: Normal pulses.      Heart sounds: Normal heart sounds.   Pulmonary:      Effort: Pulmonary effort is normal.      Breath sounds: Normal breath sounds.   Abdominal:      General: Abdomen is flat. Bowel sounds are normal.      Palpations: Abdomen is soft.      Comments: Colostomy bag in place    Musculoskeletal:         General: Normal range of motion.      Cervical back: Normal range of motion.   Skin:      General: Skin is warm.      Capillary Refill: Capillary refill takes less than 2 seconds.   Neurological:      General: No focal deficit present.      Mental Status: He is alert and oriented to person, place, and time.   Psychiatric:         Mood and Affect: Mood normal.         Behavior: Behavior normal.             Significant Labs: All pertinent labs within the past 24 hours have been reviewed.  CBC:   Recent Labs   Lab 10/17/24  1923 10/19/24  1000   WBC 9.91 7.50   HGB 9.1* 8.7*   HCT 30.0* 27.5*    326     CMP:   Recent Labs   Lab 10/17/24  1923   *   K 5.0   CL 94*   CO2 29   GLU 92   BUN 22*   CREATININE 10.1*   CALCIUM 9.9   PROT 8.7*   ALBUMIN 3.9   BILITOT 0.5   ALKPHOS 92   AST 25   ALT 27   ANIONGAP 11       Significant Imaging: I have reviewed all pertinent imaging results/findings within the past 24 hours.    Imaging Results              CT Abdomen Pelvis With IV Contrast Routine Oral Contrast (Final result)  Result time 10/18/24 01:09:21      Final result by Jose Meier MD (10/18/24 01:09:21)                   Impression:      Postoperative change of prior partial colon resection with colostomy in the left paramidline abdomen.  Redemonstration of nonspecific moderate localized mesenteric fat stranding and ill-defined fluid within the lower anterior abdomen and pelvis as detailed above.  This may reflect evolving nonspecific peritoneal infectious or inflammatory postsurgical process.    Postoperative change of the left inguinal canal containing nonspecific heterogeneous fluid/material.  There is a possible small abscess within the left inguinal canal extending to the skin surface as detailed above.  Correlation with physical examination advised.    Midline surgical incision with decreased volume of ill-defined fluid.  Additional interval decreased size of right anterior abdominal fluid collection.    Additional findings as above.      Electronically signed by: Jose Meier  MD  Date:    10/18/2024  Time:    01:09               Narrative:    EXAMINATION:  CT ABDOMEN PELVIS WITH IV CONTRAST    CLINICAL HISTORY:  Abdominal pain, post-op;    TECHNIQUE:  Low dose axial images, sagittal and coronal reformations were obtained from the lung bases to the pubic symphysis following the IV administration of 100 cc of Visipaque 320 .  Oral contrast was not given.    COMPARISON:  CT examinations 10/02/2024, 09/25/2024    FINDINGS:  The lung bases are unremarkable.  There is no pleural fluid present.  The visualized portions of the heart appear normal.    The liver is normal in size and attenuation with no focal hepatic abnormality.  The gallbladder shows no evidence of stones or pericholecystic fluid.  There is no intra-or extrahepatic biliary ductal dilatation.    The stomach, spleen, pancreas, and adrenal glands are unremarkable.    The native kidneys are atrophic.  There is a stable left renal hypodensity.  No significant hydronephrosis.  The urinary bladder is unremarkable. The prostate demonstrates no significant abnormality.    The abdominal aorta is normal in course and caliber with mild atherosclerotic calcification along its course.    There is postoperative change of prior partial colon resection with left abdominal colostomy.  There is no evidence of small-bowel obstruction.  There is continued nonspecific mesenteric fat stranding and ill-defined fluid within the anterior lower abdomen and pelvis, similar to prior examination.  There is a small volume of ill-defined fluid centrally within this region measuring approximately 4.4 x 4.8 x 5.1 cm.  Previously demonstrated fluid collection deep to the anterior abdominal wall appears significantly improved/resolved from prior study.  There is scattered retained stool throughout the colon.  There is previously demonstrated postoperative change of the left inguinal canal.  There is nonspecific heterogeneous material/fluid within the left inguinal  canal, similar to prior examination.  There is possible peripherally enhancing fluid collection measuring 1.4 x 2.5 cm within the anterior aspect of the inguinal canal possibly communicating with the skin surface (for example axial series 4, image 240).  This could reflect a small abscess.  Correlation with physical examination advised.  There are scattered mildly prominent left inguinal lymph nodes, likely reactive in etiology.  There is a midline incision containing a small volume of ill-defined fluid, improved from prior examination.  Previously identified fluid collection within the right lateral anterior abdominal wall subcutaneous soft tissues appears decreased in size from prior study.  There are scattered nodular soft tissue densities within the anterior abdominal wall which may relate previous injection sites.  No large volume of free intraperitoneal air or portal venous gas identified.  There are scattered shotty mesenteric and retroperitoneal lymph nodes.  Minimal residual subcutaneous emphysema noted associated with the left anterior abdominal wall.    Osseous structures demonstrate degenerative change.                                       X-Ray Chest AP Portable (Final result)  Result time 10/17/24 17:29:10      Final result by Jono Donahue MD (10/17/24 17:29:10)                   Impression:      No acute cardiopulmonary process.      Electronically signed by: Jono Donahue  Date:    10/17/2024  Time:    17:29               Narrative:    EXAMINATION:  XR CHEST AP PORTABLE    CLINICAL HISTORY:  Sepsis;    TECHNIQUE:  Single frontal view of the chest was performed.    COMPARISON:  Radiographs 09/25/2024.    FINDINGS:  Cardiomediastinal silhouette is within normal limits.  Lungs are well expanded and clear.  No consolidation, pneumothorax, or pleural effusion.  No acute bony changes.                                        Assessment/Plan:      * Post-operative wound abscess  Patient was seen and evaluated  by General surgery, they recommended no further intervention at this time  Zosyn is stopped   Afebrile, hemodynamically stable, WBC normal; no signs or symptoms of infection at this time  Continue wound care  Continue pain control as needed    Post-op pain  Continue pain control as needed    ESRD on hemodialysis  Has been seen and evaluated by Nephrology   Did not require dialysis yesterday as determined by Nephrology; schedule his normally Monday Wednesday Friday   Patient will dialyze today      VTE Risk Mitigation (From admission, onward)           Ordered     heparin (porcine) injection 5,000 Units  As needed (PRN)         10/18/24 1356     Reason for No Pharmacological VTE Prophylaxis  Once        Question:  Reasons:  Answer:  Patient is Ambulatory    10/18/24 0455     IP VTE HIGH RISK PATIENT  Once         10/18/24 0455     Place sequential compression device  Until discontinued         10/18/24 0455                    Discharge Planning   MARIA ISABEL: 10/23/2024     Code Status: Full Code   Is the patient medically ready for discharge?:     Reason for patient still in hospital (select all that apply): Patient trending condition and Pending disposition  Discharge Plan A: Home Health   Discharge Delays: None known at this time              Pierce Wisdom DO  Department of Hospital Medicine   [facility] [place]

## 2024-10-19 NOTE — PROGRESS NOTES
General Surgery Progress Note    Admit Date: 10/17/2024  S/P: * No surgery found *    Post-operative Day:      Hospital Day: 3    SUBJECTIVE:   Seen after dialysis today. Complaining about ostomy pouching though appears to be adequate on my exam. Complaining of sharp pain relief by passage of flatus or bowel output and ostomy. Only having small pebble like output from ostomy however.    OBJECTIVE:     Vital Signs (Most Recent)  Temp:  [98.1 °F (36.7 °C)-99.2 °F (37.3 °C)] 99.2 °F (37.3 °C)  Pulse:  [] 107  Resp:  [14-20] 14  SpO2:  [95 %-99 %] 98 %  BP: ()/(61-93) 100/63    I&Os:  I/O last 3 completed shifts:  In: 480 [P.O.:480]  Out: -     Physical Exam:  Gen: NAD, AAOx3  HEENT: Anicteric sclera  Pulm: unlabored, symmetrical   Abd: Soft, midline wound changed, open and draining. Left-sided ostomy pink and viable with small hard like stool in the lumen. Right groin bandage in place.    Laboratory:  CBC:   Recent Labs   Lab 10/19/24  1000   WBC 7.50   RBC 2.75*   HGB 8.7*   HCT 27.5*      *   MCH 31.6*   MCHC 31.6*     CMP:   Recent Labs   Lab 10/19/24  1000      CALCIUM 9.7   ALBUMIN 3.7   PROT 8.2   *   K 4.8   CO2 22*   CL 93*   BUN 37*   CREATININE 14.5*   ALKPHOS 81   ALT 21   AST 17   BILITOT 0.4     Labs within the past 24 hours have been reviewed.    ASSESSMENT/PLAN:     Patient Active Problem List    Diagnosis Date Noted    Severe malnutrition 10/19/2024    Post-operative wound abscess 10/18/2024    ESRD on hemodialysis 10/18/2024    Post-op pain 10/18/2024    Bleeding from the nose 10/02/2024    Insomnia 10/01/2024    Debility 09/30/2024    Acute hypoxic respiratory failure 09/25/2024    Sepsis 09/25/2024    Patient on waiting list for kidney transplant 08/22/2024    Essential hypertension 07/17/2024    Bacteremia 07/15/2024    Sleep apnea 03/31/2023    Hyperparathyroidism 01/10/2023    Erectile dysfunction 01/10/2023    Pulmonary hypertension 11/29/2022     Hypertensive heart and renal disease with congestive heart failure 11/29/2022    Severe obesity with body mass index (BMI) of 35.0 to 39.9 with comorbidity 10/17/2022    ESRD (end stage renal disease) 10/10/2022    Anemia due to chronic kidney disease, on chronic dialysis 10/10/2022    Persistent proteinuria 10/10/2022         44 y.o. male   -left groin and midline wounds are open and draining  -ostomy appears pink and viable though with hard stool output and cramping type abdominal pain, continue senna, will add MiraLax b.i.d., will consider adding additional stool softeners and laxative depending on response  -regular diet as tolerated

## 2024-10-19 NOTE — PROGRESS NOTES
10/19/24 1315   Handoff Report   Received From [facility] RN   Given To Myra MENA RN   Treatment Type   Treatment Type Maintenance   Vital Signs   Temp 98.8 °F (37.1 °C)   Temp Source Oral   Pulse 110   Heart Rate Source Monitor   Resp 18   SpO2 99 %   Device (Oxygen Therapy) room air   BP (!) 141/70   Assessments (Pre/Post)   Blood Liters Processed (BLP) 57.8   Transport Modality bed   Level of Consciousness (AVPU) alert   Dialyzer Clearance moderately streaked   Pain   Pain Rating (0-10): Rest 0   Post-Hemodialysis Assessment   Rinseback Volume (mL) 250 mL   Blood Volume Processed (Liters) 57.8 L   Dialyzer Clearance Moderately streaked   Duration of Treatment 180 minutes   Additional Fluid Intake (mL) 500 mL   Total UF (mL) 2500 mL   Net Fluid Removal 2000   Patient Response to Treatment pt miya tx well   Post-Treatment Weight 120 kg (264 lb 8.8 oz)   Treatment Weight Change -2   Arterial bleeding stop time (min) 6 min   Venous bleeding stop time (min) 6 min   Post-Hemodialysis Comments pt stable   Edema   Edema   (none)     Pt miya tx well, 2000 ml net UF removed, no complications with pt's lt AVF

## 2024-10-19 NOTE — PLAN OF CARE
Problem: Oral Intake Inadequate  Goal: Improved Oral Intake  Outcome: Progressing     Problem: Malnutrition  Goal: Improved Nutritional Intake  Outcome: Progressing  Intervention: Promote and Optimize Oral Intake  Flowsheets (Taken 10/19/2024 6285)  Nutrition Interventions:   diet adjusted   other (see comments)   food preferences provided  Intervention: Optimize Nutrition Delivery  Flowsheets (Taken 10/19/2024 1424)  Nutrition Support Management:   other (see comments)   weight trending reviewed     Recommendations  1.) Liberalize diet to diabetic to allow more food options to encourage intake.   2.) If PO intakes continue to remain poor, suggest regular diet and close monitoring of K+ and Phos levels.   3.) Continue Renvela 2400mg with meals.   4.) sHPT: continue calcitriol as ordered. Monitor PTH monthly and adjust vitamin D analogs as needed.   5.) Suggest renal MVI to aid in replacing vitamins lost in dialysis.  6.) Pt meets ASPEN criteria for severe malnutrition     Goals:   1.) Pt to consume/tolerate >75% of meals by RD follow up.   2.) Progression of wound healing during admission.  Nutrition Goal Status: new  Communication of RD Recs: reviewed with physician

## 2024-10-19 NOTE — NURSING
Patient vomiting large amount of partially digested food.  Dr. Wisdom notfied and treated.  See MAR.

## 2024-10-19 NOTE — ASSESSMENT & PLAN NOTE
Malnutrition Type:  Context: acute illness or injury, social/environmental circumstances  Level: severe    Related to (etiology):   Prolonged catabolic needs    Signs and Symptoms (as evidenced by):   Energy intake: pt consuming <50% of estimated nutritional needs >1 month  Weight Loss: 10% weight loss x 1 month     Malnutrition Characteristic Summary:  Weight Loss (Malnutrition): greater than 5% in 1 month  Energy Intake (Malnutrition): less than or equal to 50% for greater than or equal to 1 month      Interventions/Recommendations (treatment strategy):  1.) Liberalize diet to diabetic to allow more food options to encourage intake. 2.) If PO intakes continue to remain poor, suggest regular diet and close monitoring of K+ and Phos levels. 3.) Continue Renvela 2400mg with meals. 4.) sHPT: continue calcitriol as ordered. Monitor PTH monthly and adjust vitamin D analogs as needed. 5.) Suggest renal MVI to aid in replacing vitamins lost in dialysis. 6.) Pt meets ASPEN criteria for severe malnutrition    Nutrition Diagnosis Status:   New

## 2024-10-20 LAB
ALBUMIN SERPL BCP-MCNC: 3.7 G/DL (ref 3.5–5.2)
ALP SERPL-CCNC: 83 U/L (ref 55–135)
ALT SERPL W/O P-5'-P-CCNC: 21 U/L (ref 10–44)
ANION GAP SERPL CALC-SCNC: 12 MMOL/L (ref 8–16)
AST SERPL-CCNC: 21 U/L (ref 10–40)
BASOPHILS # BLD AUTO: 0.02 K/UL (ref 0–0.2)
BASOPHILS NFR BLD: 0.3 % (ref 0–1.9)
BILIRUB SERPL-MCNC: 0.4 MG/DL (ref 0.1–1)
BUN SERPL-MCNC: 24 MG/DL (ref 6–20)
CALCIUM SERPL-MCNC: 10 MG/DL (ref 8.7–10.5)
CHLORIDE SERPL-SCNC: 99 MMOL/L (ref 95–110)
CO2 SERPL-SCNC: 22 MMOL/L (ref 23–29)
CREAT SERPL-MCNC: 11.5 MG/DL (ref 0.5–1.4)
DIFFERENTIAL METHOD BLD: ABNORMAL
EOSINOPHIL # BLD AUTO: 0.5 K/UL (ref 0–0.5)
EOSINOPHIL NFR BLD: 7.3 % (ref 0–8)
ERYTHROCYTE [DISTWIDTH] IN BLOOD BY AUTOMATED COUNT: 20.6 % (ref 11.5–14.5)
EST. GFR  (NO RACE VARIABLE): 5.1 ML/MIN/1.73 M^2
GLUCOSE SERPL-MCNC: 100 MG/DL (ref 70–110)
HCT VFR BLD AUTO: 28.6 % (ref 40–54)
HGB BLD-MCNC: 8.6 G/DL (ref 14–18)
IMM GRANULOCYTES # BLD AUTO: 0.02 K/UL (ref 0–0.04)
IMM GRANULOCYTES NFR BLD AUTO: 0.3 % (ref 0–0.5)
LYMPHOCYTES # BLD AUTO: 1.4 K/UL (ref 1–4.8)
LYMPHOCYTES NFR BLD: 19.2 % (ref 18–48)
MAGNESIUM SERPL-MCNC: 2.2 MG/DL (ref 1.6–2.6)
MCH RBC QN AUTO: 30.4 PG (ref 27–31)
MCHC RBC AUTO-ENTMCNC: 30.1 G/DL (ref 32–36)
MCV RBC AUTO: 101 FL (ref 82–98)
MONOCYTES # BLD AUTO: 1.4 K/UL (ref 0.3–1)
MONOCYTES NFR BLD: 18.8 % (ref 4–15)
NEUTROPHILS # BLD AUTO: 3.9 K/UL (ref 1.8–7.7)
NEUTROPHILS NFR BLD: 54.1 % (ref 38–73)
NRBC BLD-RTO: 0 /100 WBC
PLATELET # BLD AUTO: 316 K/UL (ref 150–450)
PMV BLD AUTO: 9.5 FL (ref 9.2–12.9)
POTASSIUM SERPL-SCNC: 5.1 MMOL/L (ref 3.5–5.1)
PROT SERPL-MCNC: 8.2 G/DL (ref 6–8.4)
RBC # BLD AUTO: 2.83 M/UL (ref 4.6–6.2)
SODIUM SERPL-SCNC: 133 MMOL/L (ref 136–145)
WBC # BLD AUTO: 7.17 K/UL (ref 3.9–12.7)

## 2024-10-20 PROCEDURE — 36415 COLL VENOUS BLD VENIPUNCTURE: CPT | Performed by: INTERNAL MEDICINE

## 2024-10-20 PROCEDURE — 25000003 PHARM REV CODE 250: Performed by: SURGERY

## 2024-10-20 PROCEDURE — 80053 COMPREHEN METABOLIC PANEL: CPT | Performed by: INTERNAL MEDICINE

## 2024-10-20 PROCEDURE — 85025 COMPLETE CBC W/AUTO DIFF WBC: CPT | Performed by: INTERNAL MEDICINE

## 2024-10-20 PROCEDURE — 83735 ASSAY OF MAGNESIUM: CPT | Performed by: INTERNAL MEDICINE

## 2024-10-20 PROCEDURE — 25000003 PHARM REV CODE 250: Performed by: INTERNAL MEDICINE

## 2024-10-20 PROCEDURE — 25000003 PHARM REV CODE 250: Performed by: STUDENT IN AN ORGANIZED HEALTH CARE EDUCATION/TRAINING PROGRAM

## 2024-10-20 PROCEDURE — G0378 HOSPITAL OBSERVATION PER HR: HCPCS

## 2024-10-20 RX ORDER — SERTRALINE HYDROCHLORIDE 25 MG/1
25 TABLET, FILM COATED ORAL DAILY
Status: DISCONTINUED | OUTPATIENT
Start: 2024-10-20 | End: 2024-10-22 | Stop reason: HOSPADM

## 2024-10-20 RX ADMIN — OXYCODONE HYDROCHLORIDE 20 MG: 10 TABLET ORAL at 11:10

## 2024-10-20 RX ADMIN — OXYCODONE HYDROCHLORIDE 20 MG: 10 TABLET, FILM COATED, EXTENDED RELEASE ORAL at 09:10

## 2024-10-20 RX ADMIN — SEVELAMER CARBONATE 2400 MG: 800 TABLET, FILM COATED ORAL at 08:10

## 2024-10-20 RX ADMIN — SERTRALINE HYDROCHLORIDE 25 MG: 25 TABLET ORAL at 11:10

## 2024-10-20 RX ADMIN — OXYCODONE HYDROCHLORIDE 20 MG: 10 TABLET, FILM COATED, EXTENDED RELEASE ORAL at 08:10

## 2024-10-20 RX ADMIN — CALCIUM CARBONATE (ANTACID) CHEW TAB 500 MG 1000 MG: 500 CHEW TAB at 08:10

## 2024-10-20 RX ADMIN — LOSARTAN POTASSIUM 100 MG: 50 TABLET, FILM COATED ORAL at 08:10

## 2024-10-20 RX ADMIN — SENNOSIDES AND DOCUSATE SODIUM 2 TABLET: 8.6; 5 TABLET ORAL at 09:10

## 2024-10-20 RX ADMIN — ISOSORBIDE MONONITRATE 30 MG: 30 TABLET, EXTENDED RELEASE ORAL at 08:10

## 2024-10-20 RX ADMIN — FAMOTIDINE 20 MG: 20 TABLET ORAL at 08:10

## 2024-10-20 RX ADMIN — CALCIUM CARBONATE (ANTACID) CHEW TAB 500 MG 1000 MG: 500 CHEW TAB at 02:10

## 2024-10-20 RX ADMIN — POLYETHYLENE GLYCOL 3350 17 G: 17 POWDER, FOR SOLUTION ORAL at 08:10

## 2024-10-20 RX ADMIN — CALCIUM CARBONATE (ANTACID) CHEW TAB 500 MG 1000 MG: 500 CHEW TAB at 09:10

## 2024-10-20 RX ADMIN — CALCITRIOL CAPSULES 0.25 MCG 0.25 MCG: 0.25 CAPSULE ORAL at 08:10

## 2024-10-20 RX ADMIN — SEVELAMER CARBONATE 2400 MG: 800 TABLET, FILM COATED ORAL at 11:10

## 2024-10-20 RX ADMIN — METOPROLOL SUCCINATE 150 MG: 50 TABLET, FILM COATED, EXTENDED RELEASE ORAL at 08:10

## 2024-10-20 RX ADMIN — POLYETHYLENE GLYCOL 3350 17 G: 17 POWDER, FOR SOLUTION ORAL at 09:10

## 2024-10-20 RX ADMIN — SEVELAMER CARBONATE 2400 MG: 800 TABLET, FILM COATED ORAL at 04:10

## 2024-10-20 RX ADMIN — SENNOSIDES AND DOCUSATE SODIUM 2 TABLET: 8.6; 5 TABLET ORAL at 08:10

## 2024-10-20 RX ADMIN — GABAPENTIN 100 MG: 100 CAPSULE ORAL at 08:10

## 2024-10-20 NOTE — PLAN OF CARE
Problem: Infection  Goal: Absence of Infection Signs and Symptoms  Outcome: Progressing     Problem: Wound  Goal: Optimal Wound Healing  Outcome: Progressing  Intervention: Promote Wound Healing  Flowsheets (Taken 10/20/2024 5891)  Sleep/Rest Enhancement: (Wound care completed this shift)   awakenings minimized   regular sleep/rest pattern promoted   other (see comments)

## 2024-10-20 NOTE — PROGRESS NOTES
General Surgery Progress Note    Admit Date: 10/17/2024  S/P: * No surgery found *    Post-operative Day:      Hospital Day: 4    SUBJECTIVE:   Patient with a pasty output from his ostomy.  Still was concern about the appliance being inappropriately positioned though I reassured him I feel it was appropriate.      OBJECTIVE:     Vital Signs (Most Recent)  Temp:  [97.5 °F (36.4 °C)-99.4 °F (37.4 °C)] 98.6 °F (37 °C)  Pulse:  [] 104  Resp:  [14-18] 18  SpO2:  [94 %-98 %] 97 %  BP: ()/(46-63) 93/57    I&Os:  I/O last 3 completed shifts:  In: 980 [P.O.:480; Other:500]  Out: 2500 [Other:2500]    Physical Exam:  Gen: NAD, AAOx3  HEENT: Anicteric sclera  Pulm: unlabored, symmetrical   Abd: Soft, midline wound changed, open and draining. Left-sided ostomy pink and viable with small hard like stool in the lumen. Right groin bandage in place.    Laboratory:  CBC:   Recent Labs   Lab 10/20/24  0502   WBC 7.17   RBC 2.83*   HGB 8.6*   HCT 28.6*      *   MCH 30.4   MCHC 30.1*     CMP:   Recent Labs   Lab 10/20/24  0502      CALCIUM 10.0   ALBUMIN 3.7   PROT 8.2   *   K 5.1   CO2 22*   CL 99   BUN 24*   CREATININE 11.5*   ALKPHOS 83   ALT 21   AST 21   BILITOT 0.4     Labs within the past 24 hours have been reviewed.    ASSESSMENT/PLAN:     Patient Active Problem List    Diagnosis Date Noted    Severe malnutrition 10/19/2024    Post-operative wound abscess 10/18/2024    ESRD on hemodialysis 10/18/2024    Post-op pain 10/18/2024    Bleeding from the nose 10/02/2024    Insomnia 10/01/2024    Debility 09/30/2024    Acute hypoxic respiratory failure 09/25/2024    Sepsis 09/25/2024    Patient on waiting list for kidney transplant 08/22/2024    Essential hypertension 07/17/2024    Bacteremia 07/15/2024    Sleep apnea 03/31/2023    Hyperparathyroidism 01/10/2023    Erectile dysfunction 01/10/2023    Pulmonary hypertension 11/29/2022    Hypertensive heart and renal disease with congestive heart  failure 11/29/2022    Severe obesity with body mass index (BMI) of 35.0 to 39.9 with comorbidity 10/17/2022    ESRD (end stage renal disease) 10/10/2022    Anemia due to chronic kidney disease, on chronic dialysis 10/10/2022    Persistent proteinuria 10/10/2022         44 y.o. male   -left groin and midline wounds are open and draining  -ostomy appears pink and viable, continue stool softeners and laxative   -diet as tolerated  -from surgical standpoint may be able to be discharged back to inpatient rehab with continued antibiotics and wound care

## 2024-10-20 NOTE — HOSPITAL COURSE
Patient was initially seen and evaluated by General surgery who did not recommend any surgical intervention.  Patient resumed his dialysis.  His labs have remained unchanged, unremarkable.  He has remained hemodynamically stable, and has had no complaints.  Working towards placement back at the rehab facility from which he came.

## 2024-10-20 NOTE — PROGRESS NOTES
INPATIENT NEPHROLOGY Progress Note  [facility] NEPHROLOGY    João Ham  10/20/2024    Reason for consultation:  ESRD    Chief Complaint:   Chief Complaint   Patient presents with    Wound Infection     + wound culture for s/p hernia repair     Hyperkalemia     Is currently being dialyzed daily due to potassium level      History of Present Illness:  Per H and P    Patient is a 44 year old male with history of ESDR on dialysis MWF, awaiting kidney transplant, HTN, presented to ED with 3 days history of left groin pain and swelling. States swelling comes every time he cough and is being painful. Patient has low grade fever 99s at home. He has been vomiting bilious fluid last 2-3 days. No diarrhea or abdominal pain.      In the ED CT abdomen showed Left inguinal hernia containing fat and air as well as marked inflammation extending from inside the pelvis, in the hernia and down into the left scrotum. This is consistent with an infected inguinal hernia, possible gangrene. WBC was 14, patient was tachycardic. General surgery was consulted and patient was admitted under hospitalist.     9/19  avf nonfunctional.   No sob or chest pain.  Has post op pain.  AFVSS  9/20  afvss.  Pt doesn't want to see previous vascular surgeon who put his avf in.  No alternative available.  Transfer center called.  Has temporary trialysis catheter.  Patient seen on hemodialysis for uremic clearance and ultrafiltration.    9/21  2.5L off w/HD yesterday.  Tmax 101.8, pressures ok.  Lab results reviewed, Hgb low but better than yesterday.  C/o post op pain, no other complaints.  9/22 POD 5.  VSS, lab results reviewed.  Hgb 7.6, added epo to HD orders.  C/o gas pains, plans to move around more today.  Next HD tomorrow.  9/23 VSS. HD today. Transfuse with next HD as needed if Hgb stil low.  9/24 VSS. Appreciate input from Urology, Gen Surgery, Vascular Surgery, ID on this complex patient. Plan for fistulogram tomorrow. CXR yesterday shows  trialysis line tip in appropriate cocation. Will attempt HD today since we skipped yesterday due to nurse and patient concerns that the line may be malpositioned.  9/25 VSS. s/p cephalic vein dilation by Dr. Robledo yesterday, tolerated HD very well. Hold HD today. Hypotension this AM, low grade fever yesterday, WBC elevated, received IVF bolus and Midodrine. Continues to be infected per Surgery, we can remove trialysis line now that AVF is treated and usable... Consider ICU. Stopped hydralazine and olmesartan, decreased Clonidine to 0.1 BID, not sure what to do with Metoprolol 150mg and Hydralazine 120mg... He may be sob/hypoxic due to anemia, suggest 1 PRBC instead of IVF, since Hgb is 7.    Addendum @ 10:47 AM  Seen at bedside in ICU with Dr. Rodriguez and Dr. Au. Mother present as well. Reviewed imaging. Agree with plan for urgent ex lap, remove central line and place mid line, keep current abx, hold on transfusion and dialysis and reassess later. ABG and fresh set of cx now, re-evalaute later.    Addendum @ 4:36 PM  Discussed briefly with Dr. Connors, dialysis nurse Brant and with ICU nurse. Patient had colostomy placed due to anastomotic leak and had 2 PRBC given. Upon return to floor was hypertensive with SBP > 200 and NPO. Advised Cleviprex drip for now (earlier to day he was on max dose Clonidine, mad dose Hydralazine, max dose Olmesartan, 150mg Metoprolol-XL and 120mg of Imdur - none of which he can get due to NPO) and will do urgent HD with UF 2L or so as tolerated.  9/26  POD 1 s/p . Exploratory laparotomy,. Colon resection end-colostomy, Mobilization of splenic flexure, and left inguinal canal exploration.  Post pain. No sob.  Sleepy.    9/27  AFVSS.  Some post op pain.  No sob.  No angina.   Patient seen on hemodialysis for uremic clearance and ultrafiltration.    9/28 s/p 3.8 liter uf yesterday.     AFVSS.  Post op pain.  Dry cough.  No sob.  Projectile vomiting earlier today per patient  9/30 VSS. HD  today.  10/1 GS note reviewed, working on pain control, tolerating diet  10/2 no major events overnight- due for HD today  10/3 fever, tachycardia, abd pain and stagnant leukocytosis yest- CT with possible fluid collection- may need IR drainage- remains on IV antbx  10/4 fluid collection neg for infection- working on discharge plan to NSR today- will do HD before discharge- pain control ongoing issue- hospital medicine aware  10/5  Plan is now d/c to rehab tomorrow, working on pain control.  2.5L UF w/HD yesterday.  VSS, lab results reviewed.  Added on PO4 level to labs--hasn't had one for several days--resulted at 8.5, so increased renvela dose.  Notified pt that dosing may change based on lab results.  10/6 VSS. OK to dc to rehab.    10/19  seen today on dialysis.  Feels weak and overwhelmed.  States he has lost about 20 lbs, so unsure of dry wt at this time.  Dialysis nurse to UF as tolerated, will monitor BP closely.  10/20  2L off w/HD yesterday, tolerated well.  Hypotensive this am, labs reviewed.  States has been up and around, plan is to go back to rehab tomorrow.  He feels a bit apprehensive re: colostomy care, feels like he needs more education.  No other complaints.    Plan of Care:    ESRD on HD MWF  --continue dialysis per routine    Anemia of CKD  --Hb drop- bump LOR with HD today  --transfused this admission- no acute transfusion needs today    Secondary HPT  --renal diet  --continue binders with meals    Hypertension  --continue current BP meds  --uf with hd as needed    Hyponatremia  Hyperkalemia  Acidosis  --adjust dialysate  --renal diet    AVF malfunction fixed  --appreciate Vascular eval, s/p fistulogram and cephalic vein stenosis dilation on 9/23 by Dr. Salvador.    Inguinal hernia of left side with gangrene - W/ Colonic perforation with abscess   --S/P Robotic sigmoid resection, Mobilization of splenic flexure, left inguinal canal exploration, and I&D 9/17 per Dr. Connors   --dose antbx for CrCl  < 10/HD  --CT 10/2 with new fluid collection- IR aspiration neg for infection    Thank you for allowing us to participate in this patient's care. We will continue to follow.    Vital Signs:  Temp Readings from Last 3 Encounters:   10/20/24 97.5 °F (36.4 °C) (Oral)   10/06/24 97.5 °F (36.4 °C) (Oral)   08/22/24 97.3 °F (36.3 °C) (Temporal)       Pulse Readings from Last 3 Encounters:   10/20/24 93   10/06/24 86   08/29/24 (!) 115       BP Readings from Last 3 Encounters:   10/20/24 111/60   10/06/24 102/66   08/29/24 99/68       Weight:  Wt Readings from Last 3 Encounters:   10/19/24 120.5 kg (265 lb 10.5 oz)   09/18/24 134 kg (295 lb 6.7 oz)   08/29/24 131.3 kg (289 lb 7.4 oz)       Medications:  No current facility-administered medications on file prior to encounter.     Current Outpatient Medications on File Prior to Encounter   Medication Sig Dispense Refill    acetaminophen (TYLENOL) 325 MG tablet Take 2 tablets (650 mg total) by mouth every 4 (four) hours as needed.      ALPRAZolam (XANAX) 0.25 MG tablet Take 1 tablet (0.25 mg total) by mouth nightly as needed for Insomnia.      aluminum-magnesium hydroxide-simethicone (MAALOX) 200-200-20 mg/5 mL Susp Take 30 mLs by mouth 4 (four) times daily as needed.      calcitRIOL (ROCALTROL) 0.25 MCG Cap Take 1 capsule (0.25 mcg total) by mouth once daily.      calcium carbonate (TUMS) 200 mg calcium (500 mg) chewable tablet Take 2 tablets (1,000 mg total) by mouth 3 (three) times daily. 180 tablet 11    cloNIDine (CATAPRES) 0.3 MG tablet Take 1 tablet (0.3 mg total) by mouth 3 (three) times daily as needed (SBP > 150).      epoetin mily-epbx (RETACRIT) 20,000 unit/mL injection Inject 0.67 mLs (13,400 Units total) into the skin every Mon, Wed, Fri.      gabapentin (NEURONTIN) 100 MG capsule Take 1 capsule (100 mg total) by mouth 3 (three) times daily.      heparin sodium,porcine (HEPARIN, PORCINE,) 1,000 unit/mL injection Inject 4 mLs (4,000 Units total) into the vein as  needed (line care after dialysis).      heparin sodium,porcine (HEPARIN, PORCINE,) 5,000 unit/mL injection Inject 1 mL (5,000 Units total) into the skin every 8 (eight) hours.      hydrALAZINE (APRESOLINE) 100 MG tablet Take 1 tablet (100 mg total) by mouth every 8 (eight) hours. Hold for SBP < 120 and before dialysis      HYDROmorphone (DILAUDID) 4 MG tablet Take 1 tablet (4 mg total) by mouth every 4 (four) hours as needed for Pain.      insulin aspart U-100 (NOVOLOG) 100 unit/mL (3 mL) InPn pen Inject 0-5 Units into the skin before meals and at bedtime as needed (Hyperglycemia).      isosorbide mononitrate (IMDUR) 30 MG 24 hr tablet Take 1 tablet (30 mg total) by mouth once daily.      ketoconazole (NIZORAL) 2 % shampoo Apply topically every other day.      LIDOcaine-prilocaine (EMLA) cream Apply topically as needed (pre dialysis).      melatonin (MELATIN) 3 mg tablet Take 2 tablets (6 mg total) by mouth nightly as needed for Insomnia.      metoprolol succinate (TOPROL-XL) 50 MG 24 hr tablet Take 150 mg by mouth once daily.      miconazole (MICOTIN) 2 % cream Apply topically 2 (two) times daily.      olmesartan (BENICAR) 40 MG tablet Take 1 tablet by mouth once daily (Patient taking differently: Take 40 mg by mouth once daily.) 90 tablet 0    ondansetron (ZOFRAN-ODT) 4 MG TbDL Take 1 tablet (4 mg total) by mouth every 6 (six) hours as needed (nausea).      oxyCODONE (OXYCONTIN) 10 mg 12 hr tablet Take 1 tablet (10 mg total) by mouth every 12 (twelve) hours.      oxymetazoline (AFRIN) 0.05 % nasal spray 1 spray by Nasal route 2 (two) times daily.      sevelamer carbonate (RENVELA) 800 mg Tab Take 2 tablets (1,600 mg total) by mouth 3 (three) times daily with meals. 180 tablet 11    simethicone (MYLICON) 80 MG chewable tablet Take 1 tablet (80 mg total) by mouth 3 (three) times daily as needed for Flatulence.       Scheduled Meds:   0.9% NaCl   Intravenous Once    calcitRIOL  0.25 mcg Oral Daily    calcium  "carbonate  1,000 mg Oral TID    famotidine  20 mg Oral Daily    gabapentin  100 mg Oral TID    hydrALAZINE  100 mg Oral Q8H    HYDROmorphone  1 mg Intravenous Once    isosorbide mononitrate  30 mg Oral Daily    losartan  100 mg Oral Daily    metoprolol succinate  150 mg Oral Daily    oxyCODONE  20 mg Oral Q12H    polyethylene glycol  17 g Oral BID    senna-docusate 8.6-50 mg  2 tablet Oral BID    sevelamer carbonate  2,400 mg Oral TID WM     Continuous Infusions:        PRN Meds:.  Current Facility-Administered Medications:     0.9% NaCl, , Intravenous, PRN    acetaminophen, 650 mg, Oral, Q8H PRN    acetaminophen, 650 mg, Oral, Q4H PRN    aluminum-magnesium hydroxide-simethicone, 30 mL, Oral, QID PRN    cloNIDine, 0.3 mg, Oral, TID PRN    dextrose 50%, 12.5 g, Intravenous, PRN    dextrose 50%, 25 g, Intravenous, PRN    diazePAM, 5 mg, Intravenous, Q6H PRN    glucagon (human recombinant), 1 mg, Intramuscular, PRN    glucose, 16 g, Oral, PRN    glucose, 24 g, Oral, PRN    heparin (porcine), 5,000 Units, Intravenous, PRN    HYDROmorphone, 1 mg, Intravenous, Q6H PRN    magnesium oxide, 800 mg, Oral, PRN    magnesium oxide, 800 mg, Oral, PRN    melatonin, 6 mg, Oral, Nightly PRN    naloxone, 0.02 mg, Intravenous, PRN    ondansetron, 4 mg, Intravenous, Q6H PRN    oxyCODONE, 20 mg, Oral, Q6H PRN    potassium bicarbonate, 35 mEq, Oral, PRN    potassium bicarbonate, 50 mEq, Oral, PRN    potassium bicarbonate, 60 mEq, Oral, PRN    potassium, sodium phosphates, 2 packet, Oral, PRN    potassium, sodium phosphates, 2 packet, Oral, PRN    potassium, sodium phosphates, 2 packet, Oral, PRN    sevelamer carbonate, 1,600 mg, Oral, With Snacks    sodium chloride 0.9%, 250 mL, Intravenous, PRN    sodium chloride 0.9%, 3 mL, Intravenous, Q12H PRN    Review of Systems:  Neg    Physical Exam:    /60   Pulse 93   Temp 97.5 °F (36.4 °C) (Oral)   Resp 18   Ht 6' 2" (1.88 m)   Wt 120.5 kg (265 lb 10.5 oz)   SpO2 (!) 94%   BMI " 34.11 kg/m²     General Appearance:    Alert, cooperative, no distress, appears stated age   Head:    Normocephalic, without obvious abnormality, atraumatic   Eyes:    PER, conjunctiva/corneas clear, EOM's intact in both eyes        Throat:   Lips, mucosa, and tongue normal; teeth and gums normal   Back:     Symmetric, no curvature, ROM normal, no CVA tenderness   Lungs:     Clear to auscultation bilaterally, respirations unlabored   Chest wall:    No tenderness or deformity   Heart:    Regular rate and rhythm, S1 and S2 normal, no murmur, rub   or gallop   Abdomen:     Soft, non-tender, bowel sounds active all four quadrants,     no masses, no organomegaly   Extremities:   Extremities normal, atraumatic, no cyanosis or edema   Pulses:   2+ and symmetric all extremities   MSK:   No joint or muscle swelling, tenderness or deformity   Skin:   Skin color, texture, turgor normal, no rashes or lesions   Neurologic:   CNII-XII intact, normal strength and sensation       Throughout.  No flap     Results:  Recent Labs   Lab 10/17/24  1923 10/19/24  1000 10/20/24  0502   * 131* 133*   K 5.0 4.8 5.1   CL 94* 93* 99   CO2 29 22* 22*   BUN 22* 37* 24*   CREATININE 10.1* 14.5* 11.5*   GLU 92 101 100       Recent Labs   Lab 10/17/24  1923 10/19/24  1000 10/20/24  0502   CALCIUM 9.9 9.7 10.0   ALBUMIN 3.9 3.7 3.7   MG  --  2.2 2.2       Recent Labs   Lab 02/02/23  0745 05/19/23  1022 06/19/23  1031   PTH, Intact 225.6 H 335.8 H 319.0 H       Recent Labs   Lab 10/18/24  2009 10/19/24  0729   POCTGLUCOSE 117* 109         Recent Labs   Lab 10/10/22  2005 06/22/23  0446 07/16/24  0510   Hemoglobin A1C 5.5 5.5 6.1       Recent Labs   Lab 10/17/24  1923 10/19/24  1000 10/20/24  0502   WBC 9.91 7.50 7.17   HGB 9.1* 8.7* 8.6*   HCT 30.0* 27.5* 28.6*    326 316   * 100* 101*   MCHC 30.3* 31.6* 30.1*   MONO 13.3  1.3* 14.8  1.1* 18.8*  1.4*   EOSINOPHIL 3.7 5.9 7.3       Recent Labs   Lab 10/17/24  1923 10/19/24  1000  10/20/24  0502   BILITOT 0.5 0.4 0.4   PROT 8.7* 8.2 8.2   ALBUMIN 3.9 3.7 3.7   ALKPHOS 92 81 83   ALT 27 21 21   AST 25 17 21       Recent Labs   Lab 10/10/22  1645 01/09/23  1123 06/19/23  1622 07/17/24  0505 09/19/24  1435   Color, UA Yellow   < > Straw Yellow Yellow   Appearance, UA Clear   < > Clear Hazy A Clear   pH, UA 6.0   < > 6.0 6.0 8.0   Specific Gravity, UA 1.025   < > 1.010 1.020 1.010   Protein, UA 2+ A   < > 2+ A 3+ A 2+ A   Glucose, UA Negative   < > Negative Negative Negative   Ketones, UA Negative   < > Negative Negative Negative   Urobilinogen, UA Negative  --   --  Negative Negative   Bilirubin (UA) Negative   < > Negative Negative Negative   Occult Blood UA 1+ A   < > Negative 2+ A 2+ A   Nitrite, UA Negative   < > Negative Negative Negative   RBC, UA 1   < > 0 6 H 4   WBC, UA 0   < > 2 52 H 21 H   Bacteria None   < > None None None   Hyaline Casts, UA 0   < > 0 0 0    < > = values in this interval not displayed.       Recent Labs   Lab 09/17/24  0928 09/25/24  1051 10/17/24  1923   POC PH  --  7.404  --    POC PCO2  --  37.7  --    POC HCO3  --  23.6 L  --    POC PO2  --  87  --    POC SATURATED O2  --  97  --    POC BE  --  -1  --    Sample VENOUS ARTERIAL VENOUS       Microbiology Results (last 7 days)       Procedure Component Value Units Date/Time    Blood culture x two cultures. Draw prior to antibiotics. [[id number]] Collected: 10/17/24 1853    Order Status: Completed Specimen: Blood from Peripheral, Antecubital, Left Updated: 10/19/24 2032     Blood Culture, Routine No Growth to date      No Growth to date      No Growth to date    Narrative:      Aerobic and anaerobic  Collection has been rescheduled by DEBORAO at 10/17/2024 18:16 Reason: Dr   in room said to come back  Collection has been rescheduled by DEBORAO at 10/17/2024 18:16 Reason: Dr   in room said to come back    Blood culture x two cultures. Draw prior to antibiotics. [[id number]] Collected: 10/17/24 1943    Order Status: Completed  Specimen: Blood from Peripheral, Hand, Left Updated: 10/19/24 2032     Blood Culture, Routine No Growth to date      No Growth to date      No Growth to date    Narrative:      Aerobic and anaerobic  Collection has been rescheduled by AJO at 10/17/2024 18:16 Reason: Dr   in room said to come back  Collection has been rescheduled by AJO at 10/17/2024 18:56 Reason: Pt   refusing to be stuck in hand. Come back later for second set.   Collection has been rescheduled by AJO at 10/17/2024 19:40 Reason: RN   called and said she collected the second set  Collection has been rescheduled by AJO at 10/17/2024 18:16 Reason: Dr   in room said to come back  Collection has been rescheduled by AJO at 10/17/2024 18:56 Reason: Pt   refusing to be stuck in hand. Come back later for second set.   Collection has been rescheduled by AJO at 10/17/2024 19:40 Reason: RN   called and said she collected the second set          Patient care time was spent personally by me on the following activities: > 35 minutes  Obtaining a history.  Examination of patient.  Providing medical care at the patients bedside.  Developing a treatment plan with patient or surrogate and bedside caregivers.  Ordering and reviewing laboratory studies, radiographic studies, pulse oximetry.  Ordering and performing treatments and interventions.  Evaluation of patient's response to treatment.  Discussions with consultants while on the unit and immediately available to the patient.  Re-evaluation of the patient's condition.  Documentation in the medical record.      Kellie Doty NP    [facility]  [phone]

## 2024-10-20 NOTE — SUBJECTIVE & OBJECTIVE
Interval History:  Patient was seen and examined at bedside this morning.  He is doing well today, has no complaints at this time.  He has been up moving around walking around the hallways.  Working towards possible placement back at rehab facility.    Review of Systems   All other systems reviewed and are negative.    Objective:     Vital Signs (Most Recent):  Temp: 98.6 °F (37 °C) (10/20/24 1137)  Pulse: 104 (10/20/24 1137)  Resp: 18 (10/20/24 1137)  BP: (!) 93/57 (10/20/24 1137)  SpO2: 97 % (10/20/24 1137) Vital Signs (24h Range):  Temp:  [97.5 °F (36.4 °C)-99.4 °F (37.4 °C)] 98.6 °F (37 °C)  Pulse:  [] 104  Resp:  [14-18] 18  SpO2:  [94 %-98 %] 97 %  BP: ()/(46-63) 93/57     Weight: 120.5 kg (265 lb 10.5 oz)  Body mass index is 34.11 kg/m².    Intake/Output Summary (Last 24 hours) at 10/20/2024 1614  Last data filed at 10/20/2024 0912  Gross per 24 hour   Intake 480 ml   Output --   Net 480 ml         Physical Exam  Vitals and nursing note reviewed.   Constitutional:       Appearance: Normal appearance.   HENT:      Head: Normocephalic.      Nose: Nose normal.      Mouth/Throat:      Mouth: Mucous membranes are moist.   Eyes:      Pupils: Pupils are equal, round, and reactive to light.   Cardiovascular:      Rate and Rhythm: Normal rate.      Pulses: Normal pulses.      Heart sounds: Normal heart sounds.   Pulmonary:      Effort: Pulmonary effort is normal.      Breath sounds: Normal breath sounds.   Abdominal:      General: Abdomen is flat. Bowel sounds are normal.      Palpations: Abdomen is soft.      Comments: Colostomy bag in place    Musculoskeletal:         General: Normal range of motion.      Cervical back: Normal range of motion.   Skin:     General: Skin is warm.      Capillary Refill: Capillary refill takes less than 2 seconds.   Neurological:      General: No focal deficit present.      Mental Status: He is alert and oriented to person, place, and time.   Psychiatric:         Mood and  Affect: Mood normal.         Behavior: Behavior normal.             Significant Labs: All pertinent labs within the past 24 hours have been reviewed.  CBC:   Recent Labs   Lab 10/19/24  1000 10/20/24  0502   WBC 7.50 7.17   HGB 8.7* 8.6*   HCT 27.5* 28.6*    316     CMP:   Recent Labs   Lab 10/19/24  1000 10/20/24  0502   * 133*   K 4.8 5.1   CL 93* 99   CO2 22* 22*    100   BUN 37* 24*   CREATININE 14.5* 11.5*   CALCIUM 9.7 10.0   PROT 8.2 8.2   ALBUMIN 3.7 3.7   BILITOT 0.4 0.4   ALKPHOS 81 83   AST 17 21   ALT 21 21   ANIONGAP 16 12       Significant Imaging: I have reviewed all pertinent imaging results/findings within the past 24 hours.    Imaging Results              CT Abdomen Pelvis With IV Contrast Routine Oral Contrast (Final result)  Result time 10/18/24 01:09:21      Final result by Jose Meier MD (10/18/24 01:09:21)                   Impression:      Postoperative change of prior partial colon resection with colostomy in the left paramidline abdomen.  Redemonstration of nonspecific moderate localized mesenteric fat stranding and ill-defined fluid within the lower anterior abdomen and pelvis as detailed above.  This may reflect evolving nonspecific peritoneal infectious or inflammatory postsurgical process.    Postoperative change of the left inguinal canal containing nonspecific heterogeneous fluid/material.  There is a possible small abscess within the left inguinal canal extending to the skin surface as detailed above.  Correlation with physical examination advised.    Midline surgical incision with decreased volume of ill-defined fluid.  Additional interval decreased size of right anterior abdominal fluid collection.    Additional findings as above.      Electronically signed by: Jose Meier MD  Date:    10/18/2024  Time:    01:09               Narrative:    EXAMINATION:  CT ABDOMEN PELVIS WITH IV CONTRAST    CLINICAL HISTORY:  Abdominal pain,  post-op;    TECHNIQUE:  Low dose axial images, sagittal and coronal reformations were obtained from the lung bases to the pubic symphysis following the IV administration of 100 cc of Visipaque 320 .  Oral contrast was not given.    COMPARISON:  CT examinations 10/02/2024, 09/25/2024    FINDINGS:  The lung bases are unremarkable.  There is no pleural fluid present.  The visualized portions of the heart appear normal.    The liver is normal in size and attenuation with no focal hepatic abnormality.  The gallbladder shows no evidence of stones or pericholecystic fluid.  There is no intra-or extrahepatic biliary ductal dilatation.    The stomach, spleen, pancreas, and adrenal glands are unremarkable.    The native kidneys are atrophic.  There is a stable left renal hypodensity.  No significant hydronephrosis.  The urinary bladder is unremarkable. The prostate demonstrates no significant abnormality.    The abdominal aorta is normal in course and caliber with mild atherosclerotic calcification along its course.    There is postoperative change of prior partial colon resection with left abdominal colostomy.  There is no evidence of small-bowel obstruction.  There is continued nonspecific mesenteric fat stranding and ill-defined fluid within the anterior lower abdomen and pelvis, similar to prior examination.  There is a small volume of ill-defined fluid centrally within this region measuring approximately 4.4 x 4.8 x 5.1 cm.  Previously demonstrated fluid collection deep to the anterior abdominal wall appears significantly improved/resolved from prior study.  There is scattered retained stool throughout the colon.  There is previously demonstrated postoperative change of the left inguinal canal.  There is nonspecific heterogeneous material/fluid within the left inguinal canal, similar to prior examination.  There is possible peripherally enhancing fluid collection measuring 1.4 x 2.5 cm within the anterior aspect of the  inguinal canal possibly communicating with the skin surface (for example axial series 4, image 240).  This could reflect a small abscess.  Correlation with physical examination advised.  There are scattered mildly prominent left inguinal lymph nodes, likely reactive in etiology.  There is a midline incision containing a small volume of ill-defined fluid, improved from prior examination.  Previously identified fluid collection within the right lateral anterior abdominal wall subcutaneous soft tissues appears decreased in size from prior study.  There are scattered nodular soft tissue densities within the anterior abdominal wall which may relate previous injection sites.  No large volume of free intraperitoneal air or portal venous gas identified.  There are scattered shotty mesenteric and retroperitoneal lymph nodes.  Minimal residual subcutaneous emphysema noted associated with the left anterior abdominal wall.    Osseous structures demonstrate degenerative change.                                       X-Ray Chest AP Portable (Final result)  Result time 10/17/24 17:29:10      Final result by Jono Donahue MD (10/17/24 17:29:10)                   Impression:      No acute cardiopulmonary process.      Electronically signed by: Jono Donahue  Date:    10/17/2024  Time:    17:29               Narrative:    EXAMINATION:  XR CHEST AP PORTABLE    CLINICAL HISTORY:  Sepsis;    TECHNIQUE:  Single frontal view of the chest was performed.    COMPARISON:  Radiographs 09/25/2024.    FINDINGS:  Cardiomediastinal silhouette is within normal limits.  Lungs are well expanded and clear.  No consolidation, pneumothorax, or pleural effusion.  No acute bony changes.

## 2024-10-20 NOTE — ASSESSMENT & PLAN NOTE
Has been seen and evaluated by Nephrology   Did not require dialysis yesterday as determined by Nephrology; schedule his normally Monday Wednesday Friday   Has resumed dialysis

## 2024-10-20 NOTE — PROGRESS NOTES
[facility] Medicine  Progress Note    Patient Name: João Ham  MRN: [id number]  Patient Class: OP- Observation   Admission Date: 10/17/2024  Length of Stay: 0 days  Attending Physician: Pierce Wisdom DO  Primary Care Provider: Dawson Granado MD        Subjective:     Principal Problem:Post-operative wound abscess        HPI:   44-year-old man with an unfortunate history of uninfected inguinal hernia mesh with gangrene resulting in complicated wound with wound VAC placement and colostomy.  He also has a history of end-stage renal disease on hemodialysis and presents to the emergency department today for multiple issues.  He had a culture obtained from his left groin wound where the wound VAC is in place on 10/15 that came back positive today for Gram-negative rods.  His right arm MID line was clogged and was forcefully being flushed according to the patient and left him with right shoulder and arm pain subsequently.  He wants the midline removed and another 1 placed.  He states his potassium has been high and he had to receive dialysis yesterday but then again today.  He believes this is due to the healthcare workers at the rehabilitation facility not administering his phosphorus binding medications prior to eating.       Non smoker  Non drinker    Currently living in rehab facility       DC summary from our service 10/6/2024    Patient is a 44 year old male with history of ESRD, was admitted with suspected inguinal hernia gangrene. General surgery was consulted and patient was taken to OR. Found to have colonic perforation due to mesh erosion with an abscess. Patient underwent sigmoid resection and drainage of the abscess, was started on clindamycin, Vancomycin and cefepime. Nephrology was consulted for chronic dialysis. AVF was not functioning, General surgery consulted, trialysis catheter placed. PRBC transfused during dialysis for low Hb 6.6.  While on broad-spectrum coverage,  patient is spiking fevers and worsening leukocytosis, id consulted, discontinued clindamycin, vancomycin and cefepime-added daptomycin, Diflucan and Zosyn.  Repeated CT abdomen and pelvis that showed residual abscess/phlegmon and contained perforation. Re-consulted surgery who mentioned likely post op changes. Fistulogram 9/24 by vascular surgery for declotting the AV fistula. Trialysis removed 9/25. Had worsening white count, fevers upto 103.5 and hypotension overnight 9/25.  Also had hemoglobin of 7, with worsening hypoxia up to 7 L (overnight desaturated to 70% with lethargy requiring BiPAP placement), after which nephrology recommended 1 unit PRBC transfusion.  Id, Nephrology, Urology, General surgery were updated about his worsening condition.We CT repeated that showed intraperitoneal free air and findings suggestive of necrotizing fasciitis, general surgery was consulted stat, patient was transferred to ICU.  The surgeon performed open laparotomy and noted fecal contamination of the left lower quadrant indicative of anastomotic disruption.  Colon was resected, and colostomy was created. Patient was placed on full liquid diet, was unable to tolerate and downgraded back to clear liquid diet.  Also complaining of excess mucus and cough.  Patient encouraged to use incentive spirometer as directed, and press pillow to abdomen when coughing.  Pulmonology signed off on 09/29 with recommendation to continue BiPAP nightly and with naps.  PT/OT recommended high-intensity therapy due to patient's deconditioning and discharge planning was started.  Pain regimen was adjusted.  Tachycardia and stagnant white blood cell count noted with low-grade temps-a CT abdomen and pelvis was pursued on 10/02.  This was revealing for 2 new fluid collections.  These were discussed with general surgery and IR.  IR did do an aspirate of the collection they felt easily accessible, this was negative on Gram stain with cultures pending.  Did  not feel either fluid collection with rim enhancing or indicative of an abscess.  General surgery agreed.  Leukocytosis improved.  Temperature is improved.  He had issues with ongoing pain control and long-acting pain regimen was added. Patient cleared for discharge from surgical standpoint with f/u in 1-2 weeks. BERNABE drain removed and surgical site CDI. Patient assessed on day of discharge and stable for discharge. Some overnight epistaxis reported but patient also reported that he was picking and blowing his nose a lot. Educated on nose bleeds and education included in discharge instructions. Patient also instructed to only use Afrin 1-2 times in a 24 hour period and not to exceed 3 days. This was also included in discharge instructions. Patient will continue abx thru 10/9 and then have midline removed. He is to f/u with ID in 3 weeks. He is also to f/u with urology in 2 weeks. Plan discussed with patient and he is agreeable and ready for discharge.               Impression: CT scan from [facility] in ER      Postoperative change of prior partial colon resection with colostomy in the left paramidline abdomen.  Redemonstration of nonspecific moderate localized mesenteric fat stranding and ill-defined fluid within the lower anterior abdomen and pelvis as detailed above.  This may reflect evolving nonspecific peritoneal infectious or inflammatory postsurgical process.     Postoperative change of the left inguinal canal containing nonspecific heterogeneous fluid/material.  There is a possible small abscess within the left inguinal canal extending to the skin surface as detailed above.  Correlation with physical examination advised.     Midline surgical incision with decreased volume of ill-defined fluid.  Additional interval decreased size of right anterior abdominal fluid collection.        His WBC was normal   No left shift    Lactic acid normal     CMP normal for ESRD     Potassium is 5         Plan is to admit to our  service to address the findings on CT scan , his pain and any HD access issues with nephrology and General Surgery        Overview/Hospital Course:  Patient was initially seen and evaluated by General surgery who did not recommend any surgical intervention.  Patient resumed his dialysis.  His labs have remained unchanged, unremarkable.  He has remained hemodynamically stable, and has had no complaints.  Working towards placement back at the rehab facility from which he came.    Interval History:  Patient was seen and examined at bedside this morning.  He is doing well today, has no complaints at this time.  He has been up moving around walking around the hallways.  Working towards possible placement back at rehab facility.    Review of Systems   All other systems reviewed and are negative.    Objective:     Vital Signs (Most Recent):  Temp: 98.6 °F (37 °C) (10/20/24 1137)  Pulse: 104 (10/20/24 1137)  Resp: 18 (10/20/24 1137)  BP: (!) 93/57 (10/20/24 1137)  SpO2: 97 % (10/20/24 1137) Vital Signs (24h Range):  Temp:  [97.5 °F (36.4 °C)-99.4 °F (37.4 °C)] 98.6 °F (37 °C)  Pulse:  [] 104  Resp:  [14-18] 18  SpO2:  [94 %-98 %] 97 %  BP: ()/(46-63) 93/57     Weight: 120.5 kg (265 lb 10.5 oz)  Body mass index is 34.11 kg/m².    Intake/Output Summary (Last 24 hours) at 10/20/2024 1614  Last data filed at 10/20/2024 0912  Gross per 24 hour   Intake 480 ml   Output --   Net 480 ml         Physical Exam  Vitals and nursing note reviewed.   Constitutional:       Appearance: Normal appearance.   HENT:      Head: Normocephalic.      Nose: Nose normal.      Mouth/Throat:      Mouth: Mucous membranes are moist.   Eyes:      Pupils: Pupils are equal, round, and reactive to light.   Cardiovascular:      Rate and Rhythm: Normal rate.      Pulses: Normal pulses.      Heart sounds: Normal heart sounds.   Pulmonary:      Effort: Pulmonary effort is normal.      Breath sounds: Normal breath sounds.   Abdominal:      General:  Abdomen is flat. Bowel sounds are normal.      Palpations: Abdomen is soft.      Comments: Colostomy bag in place    Musculoskeletal:         General: Normal range of motion.      Cervical back: Normal range of motion.   Skin:     General: Skin is warm.      Capillary Refill: Capillary refill takes less than 2 seconds.   Neurological:      General: No focal deficit present.      Mental Status: He is alert and oriented to person, place, and time.   Psychiatric:         Mood and Affect: Mood normal.         Behavior: Behavior normal.             Significant Labs: All pertinent labs within the past 24 hours have been reviewed.  CBC:   Recent Labs   Lab 10/19/24  1000 10/20/24  0502   WBC 7.50 7.17   HGB 8.7* 8.6*   HCT 27.5* 28.6*    316     CMP:   Recent Labs   Lab 10/19/24  1000 10/20/24  0502   * 133*   K 4.8 5.1   CL 93* 99   CO2 22* 22*    100   BUN 37* 24*   CREATININE 14.5* 11.5*   CALCIUM 9.7 10.0   PROT 8.2 8.2   ALBUMIN 3.7 3.7   BILITOT 0.4 0.4   ALKPHOS 81 83   AST 17 21   ALT 21 21   ANIONGAP 16 12       Significant Imaging: I have reviewed all pertinent imaging results/findings within the past 24 hours.    Imaging Results              CT Abdomen Pelvis With IV Contrast Routine Oral Contrast (Final result)  Result time 10/18/24 01:09:21      Final result by Jose Meier MD (10/18/24 01:09:21)                   Impression:      Postoperative change of prior partial colon resection with colostomy in the left paramidline abdomen.  Redemonstration of nonspecific moderate localized mesenteric fat stranding and ill-defined fluid within the lower anterior abdomen and pelvis as detailed above.  This may reflect evolving nonspecific peritoneal infectious or inflammatory postsurgical process.    Postoperative change of the left inguinal canal containing nonspecific heterogeneous fluid/material.  There is a possible small abscess within the left inguinal canal extending to the skin surface  as detailed above.  Correlation with physical examination advised.    Midline surgical incision with decreased volume of ill-defined fluid.  Additional interval decreased size of right anterior abdominal fluid collection.    Additional findings as above.      Electronically signed by: Jose Meier MD  Date:    10/18/2024  Time:    01:09               Narrative:    EXAMINATION:  CT ABDOMEN PELVIS WITH IV CONTRAST    CLINICAL HISTORY:  Abdominal pain, post-op;    TECHNIQUE:  Low dose axial images, sagittal and coronal reformations were obtained from the lung bases to the pubic symphysis following the IV administration of 100 cc of Visipaque 320 .  Oral contrast was not given.    COMPARISON:  CT examinations 10/02/2024, 09/25/2024    FINDINGS:  The lung bases are unremarkable.  There is no pleural fluid present.  The visualized portions of the heart appear normal.    The liver is normal in size and attenuation with no focal hepatic abnormality.  The gallbladder shows no evidence of stones or pericholecystic fluid.  There is no intra-or extrahepatic biliary ductal dilatation.    The stomach, spleen, pancreas, and adrenal glands are unremarkable.    The native kidneys are atrophic.  There is a stable left renal hypodensity.  No significant hydronephrosis.  The urinary bladder is unremarkable. The prostate demonstrates no significant abnormality.    The abdominal aorta is normal in course and caliber with mild atherosclerotic calcification along its course.    There is postoperative change of prior partial colon resection with left abdominal colostomy.  There is no evidence of small-bowel obstruction.  There is continued nonspecific mesenteric fat stranding and ill-defined fluid within the anterior lower abdomen and pelvis, similar to prior examination.  There is a small volume of ill-defined fluid centrally within this region measuring approximately 4.4 x 4.8 x 5.1 cm.  Previously demonstrated fluid collection deep to  the anterior abdominal wall appears significantly improved/resolved from prior study.  There is scattered retained stool throughout the colon.  There is previously demonstrated postoperative change of the left inguinal canal.  There is nonspecific heterogeneous material/fluid within the left inguinal canal, similar to prior examination.  There is possible peripherally enhancing fluid collection measuring 1.4 x 2.5 cm within the anterior aspect of the inguinal canal possibly communicating with the skin surface (for example axial series 4, image 240).  This could reflect a small abscess.  Correlation with physical examination advised.  There are scattered mildly prominent left inguinal lymph nodes, likely reactive in etiology.  There is a midline incision containing a small volume of ill-defined fluid, improved from prior examination.  Previously identified fluid collection within the right lateral anterior abdominal wall subcutaneous soft tissues appears decreased in size from prior study.  There are scattered nodular soft tissue densities within the anterior abdominal wall which may relate previous injection sites.  No large volume of free intraperitoneal air or portal venous gas identified.  There are scattered shotty mesenteric and retroperitoneal lymph nodes.  Minimal residual subcutaneous emphysema noted associated with the left anterior abdominal wall.    Osseous structures demonstrate degenerative change.                                       X-Ray Chest AP Portable (Final result)  Result time 10/17/24 17:29:10      Final result by Jono Donahue MD (10/17/24 17:29:10)                   Impression:      No acute cardiopulmonary process.      Electronically signed by: Jono Donahue  Date:    10/17/2024  Time:    17:29               Narrative:    EXAMINATION:  XR CHEST AP PORTABLE    CLINICAL HISTORY:  Sepsis;    TECHNIQUE:  Single frontal view of the chest was performed.    COMPARISON:  Radiographs  09/25/2024.    FINDINGS:  Cardiomediastinal silhouette is within normal limits.  Lungs are well expanded and clear.  No consolidation, pneumothorax, or pleural effusion.  No acute bony changes.                                        Assessment/Plan:      * Post-operative wound abscess  Patient was seen and evaluated by General surgery, they recommended no further intervention at this time  Zosyn is stopped   Afebrile, hemodynamically stable, WBC normal; no signs or symptoms of infection at this time  Continue wound care  Continue pain control as needed    Post-op pain  Continue pain control as needed    ESRD on hemodialysis  Has been seen and evaluated by Nephrology   Did not require dialysis yesterday as determined by Nephrology; schedule his normally Monday Wednesday Friday   Has resumed dialysis      VTE Risk Mitigation (From admission, onward)           Ordered     heparin (porcine) injection 5,000 Units  As needed (PRN)         10/18/24 1356     Reason for No Pharmacological VTE Prophylaxis  Once        Question:  Reasons:  Answer:  Patient is Ambulatory    10/18/24 0455     IP VTE HIGH RISK PATIENT  Once         10/18/24 0455     Place sequential compression device  Until discontinued         10/18/24 0455                    Discharge Planning   MARIA ISABEL: 10/23/2024     Code Status: Full Code   Is the patient medically ready for discharge?:     Reason for patient still in hospital (select all that apply): Pending disposition  Discharge Plan A: Home Health   Discharge Delays: None known at this time              Pierce Wisdom DO  Department of Hospital Medicine   [facility]

## 2024-10-21 ENCOUNTER — PATIENT MESSAGE (OUTPATIENT)
Dept: CASE MANAGEMENT | Facility: HOSPITAL | Age: 44
End: 2024-10-21

## 2024-10-21 VITALS
WEIGHT: 265.63 LBS | TEMPERATURE: 98 F | BODY MASS INDEX: 34.09 KG/M2 | HEIGHT: 74 IN | DIASTOLIC BLOOD PRESSURE: 55 MMHG | OXYGEN SATURATION: 99 % | RESPIRATION RATE: 17 BRPM | HEART RATE: 85 BPM | SYSTOLIC BLOOD PRESSURE: 95 MMHG

## 2024-10-21 LAB
ALBUMIN SERPL BCP-MCNC: 3.6 G/DL (ref 3.5–5.2)
ALP SERPL-CCNC: 73 U/L (ref 55–135)
ALT SERPL W/O P-5'-P-CCNC: 29 U/L (ref 10–44)
ANION GAP SERPL CALC-SCNC: 12 MMOL/L (ref 8–16)
AST SERPL-CCNC: 28 U/L (ref 10–40)
BASOPHILS # BLD AUTO: 0.02 K/UL (ref 0–0.2)
BASOPHILS NFR BLD: 0.3 % (ref 0–1.9)
BILIRUB SERPL-MCNC: 0.4 MG/DL (ref 0.1–1)
BUN SERPL-MCNC: 39 MG/DL (ref 6–20)
CALCIUM SERPL-MCNC: 9.7 MG/DL (ref 8.7–10.5)
CHLORIDE SERPL-SCNC: 99 MMOL/L (ref 95–110)
CLASS I ANTIBODIES - LUMINEX: NORMAL
CLASS I ANTIBODY COMMENTS - LUMINEX: NORMAL
CLASS II ANTIBODY COMMENTS - LUMINEX: NORMAL
CO2 SERPL-SCNC: 22 MMOL/L (ref 23–29)
CPRA %: 1
CREAT SERPL-MCNC: 14.3 MG/DL (ref 0.5–1.4)
DIFFERENTIAL METHOD BLD: ABNORMAL
EOSINOPHIL # BLD AUTO: 0.5 K/UL (ref 0–0.5)
EOSINOPHIL NFR BLD: 7.2 % (ref 0–8)
ERYTHROCYTE [DISTWIDTH] IN BLOOD BY AUTOMATED COUNT: 20.1 % (ref 11.5–14.5)
EST. GFR  (NO RACE VARIABLE): 3.9 ML/MIN/1.73 M^2
GLUCOSE SERPL-MCNC: 98 MG/DL (ref 70–110)
HCT VFR BLD AUTO: 25.9 % (ref 40–54)
HGB BLD-MCNC: 8.1 G/DL (ref 14–18)
IMM GRANULOCYTES # BLD AUTO: 0.02 K/UL (ref 0–0.04)
IMM GRANULOCYTES NFR BLD AUTO: 0.3 % (ref 0–0.5)
LYMPHOCYTES # BLD AUTO: 1.4 K/UL (ref 1–4.8)
LYMPHOCYTES NFR BLD: 22.3 % (ref 18–48)
MAGNESIUM SERPL-MCNC: 2.1 MG/DL (ref 1.6–2.6)
MCH RBC QN AUTO: 31.5 PG (ref 27–31)
MCHC RBC AUTO-ENTMCNC: 31.3 G/DL (ref 32–36)
MCV RBC AUTO: 101 FL (ref 82–98)
MONOCYTES # BLD AUTO: 0.8 K/UL (ref 0.3–1)
MONOCYTES NFR BLD: 13.3 % (ref 4–15)
NEUTROPHILS # BLD AUTO: 3.5 K/UL (ref 1.8–7.7)
NEUTROPHILS NFR BLD: 56.6 % (ref 38–73)
NRBC BLD-RTO: 0 /100 WBC
PLATELET # BLD AUTO: 300 K/UL (ref 150–450)
PMV BLD AUTO: 9.1 FL (ref 9.2–12.9)
POTASSIUM SERPL-SCNC: 5 MMOL/L (ref 3.5–5.1)
PROT SERPL-MCNC: 7.7 G/DL (ref 6–8.4)
RBC # BLD AUTO: 2.57 M/UL (ref 4.6–6.2)
SERUM COLLECTION DT - LUMINEX CLASS I: NORMAL
SERUM COLLECTION DT - LUMINEX CLASS II: NORMAL
SODIUM SERPL-SCNC: 133 MMOL/L (ref 136–145)
SPCL1 TESTING DATE: NORMAL
SPCL2 TESTING DATE: NORMAL
SPCLU TESTING DATE: NORMAL
WBC # BLD AUTO: 6.24 K/UL (ref 3.9–12.7)

## 2024-10-21 PROCEDURE — 80053 COMPREHEN METABOLIC PANEL: CPT | Performed by: INTERNAL MEDICINE

## 2024-10-21 PROCEDURE — 25000003 PHARM REV CODE 250: Performed by: SURGERY

## 2024-10-21 PROCEDURE — 25000003 PHARM REV CODE 250: Performed by: INTERNAL MEDICINE

## 2024-10-21 PROCEDURE — 83735 ASSAY OF MAGNESIUM: CPT | Performed by: INTERNAL MEDICINE

## 2024-10-21 PROCEDURE — G0378 HOSPITAL OBSERVATION PER HR: HCPCS

## 2024-10-21 PROCEDURE — 85025 COMPLETE CBC W/AUTO DIFF WBC: CPT | Performed by: INTERNAL MEDICINE

## 2024-10-21 PROCEDURE — 36415 COLL VENOUS BLD VENIPUNCTURE: CPT | Performed by: INTERNAL MEDICINE

## 2024-10-21 PROCEDURE — 90935 HEMODIALYSIS ONE EVALUATION: CPT

## 2024-10-21 PROCEDURE — 25000003 PHARM REV CODE 250: Performed by: STUDENT IN AN ORGANIZED HEALTH CARE EDUCATION/TRAINING PROGRAM

## 2024-10-21 RX ORDER — OXYCODONE HCL 20 MG/1
20 TABLET, FILM COATED, EXTENDED RELEASE ORAL EVERY 12 HOURS
Qty: 10 TABLET | Refills: 0 | Status: SHIPPED | OUTPATIENT
Start: 2024-10-21 | End: 2024-10-25 | Stop reason: DRUGHIGH

## 2024-10-21 RX ADMIN — POLYETHYLENE GLYCOL 3350 17 G: 17 POWDER, FOR SOLUTION ORAL at 08:10

## 2024-10-21 RX ADMIN — SEVELAMER CARBONATE 2400 MG: 800 TABLET, FILM COATED ORAL at 08:10

## 2024-10-21 RX ADMIN — LOSARTAN POTASSIUM 100 MG: 50 TABLET, FILM COATED ORAL at 08:10

## 2024-10-21 RX ADMIN — OXYCODONE HYDROCHLORIDE 20 MG: 10 TABLET, FILM COATED, EXTENDED RELEASE ORAL at 08:10

## 2024-10-21 RX ADMIN — GABAPENTIN 100 MG: 100 CAPSULE ORAL at 08:10

## 2024-10-21 RX ADMIN — CALCITRIOL CAPSULES 0.25 MCG 0.25 MCG: 0.25 CAPSULE ORAL at 08:10

## 2024-10-21 RX ADMIN — ISOSORBIDE MONONITRATE 30 MG: 30 TABLET, EXTENDED RELEASE ORAL at 08:10

## 2024-10-21 RX ADMIN — SERTRALINE HYDROCHLORIDE 25 MG: 25 TABLET ORAL at 08:10

## 2024-10-21 RX ADMIN — SENNOSIDES AND DOCUSATE SODIUM 2 TABLET: 8.6; 5 TABLET ORAL at 09:10

## 2024-10-21 RX ADMIN — SENNOSIDES AND DOCUSATE SODIUM 2 TABLET: 8.6; 5 TABLET ORAL at 08:10

## 2024-10-21 RX ADMIN — HYDRALAZINE HYDROCHLORIDE 100 MG: 25 TABLET ORAL at 09:10

## 2024-10-21 RX ADMIN — POLYETHYLENE GLYCOL 3350 17 G: 17 POWDER, FOR SOLUTION ORAL at 09:10

## 2024-10-21 RX ADMIN — FAMOTIDINE 20 MG: 20 TABLET ORAL at 08:10

## 2024-10-21 RX ADMIN — CALCIUM CARBONATE (ANTACID) CHEW TAB 500 MG 1000 MG: 500 CHEW TAB at 09:10

## 2024-10-21 RX ADMIN — CALCIUM CARBONATE (ANTACID) CHEW TAB 500 MG 1000 MG: 500 CHEW TAB at 08:10

## 2024-10-21 RX ADMIN — OXYCODONE HYDROCHLORIDE 20 MG: 10 TABLET, FILM COATED, EXTENDED RELEASE ORAL at 09:10

## 2024-10-21 RX ADMIN — METOPROLOL SUCCINATE 150 MG: 50 TABLET, FILM COATED, EXTENDED RELEASE ORAL at 08:10

## 2024-10-21 RX ADMIN — SEVELAMER CARBONATE 2400 MG: 800 TABLET, FILM COATED ORAL at 12:10

## 2024-10-21 NOTE — ASSESSMENT & PLAN NOTE
Nutrition consulted. Most recent weight and BMI monitored-     Measurements:  Wt Readings from Last 1 Encounters:   10/19/24 120.5 kg (265 lb 10.5 oz)   Body mass index is 34.11 kg/m².    Patient has been screened and assessed by RD.    Malnutrition Type:  Context: acute illness or injury, social/environmental circumstances  Level: severe    Malnutrition Characteristic Summary:  Weight Loss (Malnutrition): greater than 5% in 1 month  Energy Intake (Malnutrition): less than or equal to 50% for greater than or equal to 1 month    Interventions/Recommendations (treatment strategy):  1.) Liberalize diet to diabetic to allow more food options to encourage intake. 2.) If PO intakes continue to remain poor, suggest regular diet and close monitoring of K+ and Phos levels. 3.) Continue Renvela 2400mg with meals. 4.) sHPT: continue calcitriol as ordered. Monitor PTH monthly and adjust vitamin D analogs as needed. 5.) Suggest renal MVI to aid in replacing vitamins lost in dialysis. 6.) Pt meets ASPEN criteria for severe malnutrition

## 2024-10-21 NOTE — SUBJECTIVE & OBJECTIVE
Interval History:  Patient was seen and examined at bedside this morning.  He is doing well today, has no complaints at this time.  He has been up moving around walking around the hallways.  Working towards possible placement back at rehab facility.    Review of Systems   All other systems reviewed and are negative.    Objective:     Vital Signs (Most Recent):  Temp: 97.9 °F (36.6 °C) (10/21/24 0500)  Pulse: 79 (10/21/24 0500)  Resp: 18 (10/21/24 0500)  BP: 121/68 (10/21/24 0500)  SpO2: 97 % (10/21/24 0500) Vital Signs (24h Range):  Temp:  [97.5 °F (36.4 °C)-98.6 °F (37 °C)] 97.9 °F (36.6 °C)  Pulse:  [] 79  Resp:  [10-18] 18  SpO2:  [94 %-98 %] 97 %  BP: ()/(57-83) 121/68     Weight: 120.5 kg (265 lb 10.5 oz)  Body mass index is 34.11 kg/m².    Intake/Output Summary (Last 24 hours) at 10/21/2024 0735  Last data filed at 10/20/2024 1731  Gross per 24 hour   Intake 480 ml   Output --   Net 480 ml         Physical Exam  Vitals and nursing note reviewed.   Constitutional:       Appearance: Normal appearance.   HENT:      Head: Normocephalic.      Nose: Nose normal.      Mouth/Throat:      Mouth: Mucous membranes are moist.   Eyes:      Pupils: Pupils are equal, round, and reactive to light.   Cardiovascular:      Rate and Rhythm: Normal rate.      Pulses: Normal pulses.      Heart sounds: Normal heart sounds.   Pulmonary:      Effort: Pulmonary effort is normal.      Breath sounds: Normal breath sounds.   Abdominal:      General: Abdomen is flat. Bowel sounds are normal.      Palpations: Abdomen is soft.      Comments: Colostomy bag in place    Musculoskeletal:         General: Normal range of motion.      Cervical back: Normal range of motion.   Skin:     General: Skin is warm.      Capillary Refill: Capillary refill takes less than 2 seconds.   Neurological:      General: No focal deficit present.      Mental Status: He is alert and oriented to person, place, and time.   Psychiatric:         Mood and  Affect: Mood normal.         Behavior: Behavior normal.             Significant Labs: All pertinent labs within the past 24 hours have been reviewed.  CBC:   Recent Labs   Lab 10/19/24  1000 10/20/24  0502   WBC 7.50 7.17   HGB 8.7* 8.6*   HCT 27.5* 28.6*    316     CMP:   Recent Labs   Lab 10/19/24  1000 10/20/24  0502   * 133*   K 4.8 5.1   CL 93* 99   CO2 22* 22*    100   BUN 37* 24*   CREATININE 14.5* 11.5*   CALCIUM 9.7 10.0   PROT 8.2 8.2   ALBUMIN 3.7 3.7   BILITOT 0.4 0.4   ALKPHOS 81 83   AST 17 21   ALT 21 21   ANIONGAP 16 12     Microbiology Results (last 7 days)       Procedure Component Value Units Date/Time    Blood culture x two cultures. Draw prior to antibiotics. [[id number]] Collected: 10/17/24 1853    Order Status: Completed Specimen: Blood from Peripheral, Antecubital, Left Updated: 10/20/24 2032     Blood Culture, Routine No Growth to date      No Growth to date      No Growth to date      No Growth to date    Narrative:      Aerobic and anaerobic  Collection has been rescheduled by AJO at 10/17/2024 18:16 Reason: Dr   in room said to come back  Collection has been rescheduled by AJO at 10/17/2024 18:16 Reason: Dr   in room said to come back    Blood culture x two cultures. Draw prior to antibiotics. [[id number]] Collected: 10/17/24 1943    Order Status: Completed Specimen: Blood from Peripheral, Hand, Left Updated: 10/20/24 2032     Blood Culture, Routine No Growth to date      No Growth to date      No Growth to date      No Growth to date    Narrative:      Aerobic and anaerobic  Collection has been rescheduled by AJO at 10/17/2024 18:16 Reason: Dr   in room said to come back  Collection has been rescheduled by AJO at 10/17/2024 18:56 Reason: Pt   refusing to be stuck in hand. Come back later for second set.   Collection has been rescheduled by AJO at 10/17/2024 19:40 Reason: RN   called and said she collected the second set  Collection has been rescheduled by AJO at  10/17/2024 18:16 Reason: Dr   in room said to come back  Collection has been rescheduled by AJO at 10/17/2024 18:56 Reason: Pt   refusing to be stuck in hand. Come back later for second set.   Collection has been rescheduled by AJO at 10/17/2024 19:40 Reason: RN   called and said she collected the second set          Significant Imaging: I have reviewed all pertinent imaging results/findings within the past 24 hours.    Imaging Results              CT Abdomen Pelvis With IV Contrast Routine Oral Contrast (Final result)  Result time 10/18/24 01:09:21      Final result by Jose Meier MD (10/18/24 01:09:21)                   Impression:      Postoperative change of prior partial colon resection with colostomy in the left paramidline abdomen.  Redemonstration of nonspecific moderate localized mesenteric fat stranding and ill-defined fluid within the lower anterior abdomen and pelvis as detailed above.  This may reflect evolving nonspecific peritoneal infectious or inflammatory postsurgical process.    Postoperative change of the left inguinal canal containing nonspecific heterogeneous fluid/material.  There is a possible small abscess within the left inguinal canal extending to the skin surface as detailed above.  Correlation with physical examination advised.    Midline surgical incision with decreased volume of ill-defined fluid.  Additional interval decreased size of right anterior abdominal fluid collection.    Additional findings as above.      Electronically signed by: Jose Meier MD  Date:    10/18/2024  Time:    01:09               Narrative:    EXAMINATION:  CT ABDOMEN PELVIS WITH IV CONTRAST    CLINICAL HISTORY:  Abdominal pain, post-op;    TECHNIQUE:  Low dose axial images, sagittal and coronal reformations were obtained from the lung bases to the pubic symphysis following the IV administration of 100 cc of Visipaque 320 .  Oral contrast was not given.    COMPARISON:  CT examinations 10/02/2024,  09/25/2024    FINDINGS:  The lung bases are unremarkable.  There is no pleural fluid present.  The visualized portions of the heart appear normal.    The liver is normal in size and attenuation with no focal hepatic abnormality.  The gallbladder shows no evidence of stones or pericholecystic fluid.  There is no intra-or extrahepatic biliary ductal dilatation.    The stomach, spleen, pancreas, and adrenal glands are unremarkable.    The native kidneys are atrophic.  There is a stable left renal hypodensity.  No significant hydronephrosis.  The urinary bladder is unremarkable. The prostate demonstrates no significant abnormality.    The abdominal aorta is normal in course and caliber with mild atherosclerotic calcification along its course.    There is postoperative change of prior partial colon resection with left abdominal colostomy.  There is no evidence of small-bowel obstruction.  There is continued nonspecific mesenteric fat stranding and ill-defined fluid within the anterior lower abdomen and pelvis, similar to prior examination.  There is a small volume of ill-defined fluid centrally within this region measuring approximately 4.4 x 4.8 x 5.1 cm.  Previously demonstrated fluid collection deep to the anterior abdominal wall appears significantly improved/resolved from prior study.  There is scattered retained stool throughout the colon.  There is previously demonstrated postoperative change of the left inguinal canal.  There is nonspecific heterogeneous material/fluid within the left inguinal canal, similar to prior examination.  There is possible peripherally enhancing fluid collection measuring 1.4 x 2.5 cm within the anterior aspect of the inguinal canal possibly communicating with the skin surface (for example axial series 4, image 240).  This could reflect a small abscess.  Correlation with physical examination advised.  There are scattered mildly prominent left inguinal lymph nodes, likely reactive in  etiology.  There is a midline incision containing a small volume of ill-defined fluid, improved from prior examination.  Previously identified fluid collection within the right lateral anterior abdominal wall subcutaneous soft tissues appears decreased in size from prior study.  There are scattered nodular soft tissue densities within the anterior abdominal wall which may relate previous injection sites.  No large volume of free intraperitoneal air or portal venous gas identified.  There are scattered shotty mesenteric and retroperitoneal lymph nodes.  Minimal residual subcutaneous emphysema noted associated with the left anterior abdominal wall.    Osseous structures demonstrate degenerative change.                                       X-Ray Chest AP Portable (Final result)  Result time 10/17/24 17:29:10      Final result by Jono Donahue MD (10/17/24 17:29:10)                   Impression:      No acute cardiopulmonary process.      Electronically signed by: Jono Donahue  Date:    10/17/2024  Time:    17:29               Narrative:    EXAMINATION:  XR CHEST AP PORTABLE    CLINICAL HISTORY:  Sepsis;    TECHNIQUE:  Single frontal view of the chest was performed.    COMPARISON:  Radiographs 09/25/2024.    FINDINGS:  Cardiomediastinal silhouette is within normal limits.  Lungs are well expanded and clear.  No consolidation, pneumothorax, or pleural effusion.  No acute bony changes.

## 2024-10-21 NOTE — PLAN OF CARE
POC reviewed with patient this shift.  A/O x4.  Respirations unlabored on RA.  Skin w/d.  Colostomy bag remains intact with small amount of soft, light-brown stool noted.  Incisions to midline ABD and groin area continues with dressing CDI.  No active signs of bleeding/drainage.  Pt slightly hypotensive this shift.  B/P meds held.  Pt asymptomatic throughout.  Pain to incisions continue.  PRN Oxy given x1 thus far in shift.  Tolerates meds whole with water without difficulty.  VSS.  See flowsheet for full assessment.  Able to verbalize wants/needs.  No s/s of distress.  Fall/safety precautions maintained.      Problem: Adult Inpatient Plan of Care  Goal: Plan of Care Review  Outcome: Progressing     Problem: Infection  Goal: Absence of Infection Signs and Symptoms  Outcome: Progressing     Problem: Wound  Goal: Absence of Infection Signs and Symptoms  Outcome: Progressing  Goal: Skin Health and Integrity  Outcome: Progressing     Problem: Fall Injury Risk  Goal: Absence of Fall and Fall-Related Injury  Outcome: Progressing

## 2024-10-21 NOTE — DISCHARGE SUMMARY
[facility] Medicine  Discharge Summary      Patient Name: João Ham  MRN: [id number]  [facility]: [id number]  Patient Class: OP- Observation  Admission Date: 10/17/2024  Hospital Length of Stay: 0 days  Discharge Date and Time:  10/21/2024 9:57 AM  Attending Physician: Wilfrido Rich MD   Discharging Provider: Wilfrido Rich MD  Primary Care Provider: Dawson Granado MD    Primary Care Team: Networked reference to record PCT     HPI:    44-year-old man with an unfortunate history of uninfected inguinal hernia mesh with gangrene resulting in complicated wound with wound VAC placement and colostomy.  He also has a history of end-stage renal disease on hemodialysis and presents to the emergency department today for multiple issues.  He had a culture obtained from his left groin wound where the wound VAC is in place on 10/15 that came back positive today for Gram-negative rods.  His right arm MID line was clogged and was forcefully being flushed according to the patient and left him with right shoulder and arm pain subsequently.  He wants the midline removed and another 1 placed.  He states his potassium has been high and he had to receive dialysis yesterday but then again today.  He believes this is due to the healthcare workers at the rehabilitation facility not administering his phosphorus binding medications prior to eating.       Non smoker  Non drinker    Currently living in rehab facility       DC summary from our service 10/6/2024    Patient is a 44 year old male with history of ESRD, was admitted with suspected inguinal hernia gangrene. General surgery was consulted and patient was taken to OR. Found to have colonic perforation due to mesh erosion with an abscess. Patient underwent sigmoid resection and drainage of the abscess, was started on clindamycin, Vancomycin and cefepime. Nephrology was consulted for chronic dialysis. AVF was not functioning, General surgery consulted, trialysis  catheter placed. PRBC transfused during dialysis for low Hb 6.6.  While on broad-spectrum coverage, patient is spiking fevers and worsening leukocytosis, id consulted, discontinued clindamycin, vancomycin and cefepime-added daptomycin, Diflucan and Zosyn.  Repeated CT abdomen and pelvis that showed residual abscess/phlegmon and contained perforation. Re-consulted surgery who mentioned likely post op changes. Fistulogram 9/24 by vascular surgery for declotting the AV fistula. Trialysis removed 9/25. Had worsening white count, fevers upto 103.5 and hypotension overnight 9/25.  Also had hemoglobin of 7, with worsening hypoxia up to 7 L (overnight desaturated to 70% with lethargy requiring BiPAP placement), after which nephrology recommended 1 unit PRBC transfusion.  Id, Nephrology, Urology, General surgery were updated about his worsening condition.We CT repeated that showed intraperitoneal free air and findings suggestive of necrotizing fasciitis, general surgery was consulted stat, patient was transferred to ICU.  The surgeon performed open laparotomy and noted fecal contamination of the left lower quadrant indicative of anastomotic disruption.  Colon was resected, and colostomy was created. Patient was placed on full liquid diet, was unable to tolerate and downgraded back to clear liquid diet.  Also complaining of excess mucus and cough.  Patient encouraged to use incentive spirometer as directed, and press pillow to abdomen when coughing.  Pulmonology signed off on 09/29 with recommendation to continue BiPAP nightly and with naps.  PT/OT recommended high-intensity therapy due to patient's deconditioning and discharge planning was started.  Pain regimen was adjusted.  Tachycardia and stagnant white blood cell count noted with low-grade temps-a CT abdomen and pelvis was pursued on 10/02.  This was revealing for 2 new fluid collections.  These were discussed with general surgery and IR.  IR did do an aspirate of the  collection they felt easily accessible, this was negative on Gram stain with cultures pending.  Did not feel either fluid collection with rim enhancing or indicative of an abscess.  General surgery agreed.  Leukocytosis improved.  Temperature is improved.  He had issues with ongoing pain control and long-acting pain regimen was added. Patient cleared for discharge from surgical standpoint with f/u in 1-2 weeks. BERNABE drain removed and surgical site CDI. Patient assessed on day of discharge and stable for discharge. Some overnight epistaxis reported but patient also reported that he was picking and blowing his nose a lot. Educated on nose bleeds and education included in discharge instructions. Patient also instructed to only use Afrin 1-2 times in a 24 hour period and not to exceed 3 days. This was also included in discharge instructions. Patient will continue abx thru 10/9 and then have midline removed. He is to f/u with ID in 3 weeks. He is also to f/u with urology in 2 weeks. Plan discussed with patient and he is agreeable and ready for discharge.               Impression: CT scan from [facility] in ER      Postoperative change of prior partial colon resection with colostomy in the left paramidline abdomen.  Redemonstration of nonspecific moderate localized mesenteric fat stranding and ill-defined fluid within the lower anterior abdomen and pelvis as detailed above.  This may reflect evolving nonspecific peritoneal infectious or inflammatory postsurgical process.     Postoperative change of the left inguinal canal containing nonspecific heterogeneous fluid/material.  There is a possible small abscess within the left inguinal canal extending to the skin surface as detailed above.  Correlation with physical examination advised.     Midline surgical incision with decreased volume of ill-defined fluid.  Additional interval decreased size of right anterior abdominal fluid collection.        His WBC was normal   No left  shift    Lactic acid normal     CMP normal for ESRD     Potassium is 5         Plan is to admit to our service to address the findings on CT scan , his pain and any HD access issues with nephrology and General Surgery        * No surgery found *      Hospital Course:   Patient was initially seen and evaluated by General surgery who did not recommend any surgical intervention.  Patient resumed his dialysis.  His labs have remained unchanged, unremarkable.  He has remained hemodynamically stable, and has had no complaints.   worked on getting patient back to rehab facility but ultimately patient is going home with home health.  During hospital stay ostomy nurse has helped patient to learn ostomy teaching.  Patient encouraged to follow-up at [facility] as outpatient for further teaching and care.  During hospital stay patient has been off antibiotic.  No evidence of leukocytosis, wound infection or fever noted.  Patient was supervised by general surgeon during hospital stay.  Patient to continue close follow-up with general surgeon upon discharge.  Discharge plan of care reviewed with patient, answered all questions.  Patient encouraged to improve oral protein intake.  Patient voiced understanding.    Goals of Care Treatment Preferences:  Code Status: Full Code      SDOH Screening:  The patient was screened for food insecurity, housing instability, transportation needs, utility difficulties, and interpersonal safety. The social determinant(s) of health identified as a concern this admission are:  Food insecurity    The plan to address these concerns is: please refer to  and  plan of care.     Social Drivers of Health with Concerns     Food Insecurity: Food Insecurity Present (10/18/2024)        Consults:   Consults (From admission, onward)          Status Ordering Provider     Inpatient consult to Nephrology  Once        Provider:  Jeremy Madrid MD     Acknowledged BRAYDEN ROCKWELL            Neuro  Post-op pain  Continue pain control as needed    Renal/  ESRD on hemodialysis  Has been seen and evaluated by Nephrology   Did not require dialysis yesterday as determined by Nephrology; schedule his normally Monday Wednesday Friday   Has resumed dialysis    ID  * Post-operative wound abscess  Patient was seen and evaluated by General surgery, they recommended no further intervention at this time  Zosyn is stopped   Afebrile, hemodynamically stable, WBC normal; no signs or symptoms of infection at this time  Continue wound care  Continue pain control as needed    Endocrine  Severe malnutrition  Nutrition consulted. Most recent weight and BMI monitored-     Measurements:  Wt Readings from Last 1 Encounters:   10/19/24 120.5 kg (265 lb 10.5 oz)   Body mass index is 34.11 kg/m².    Patient has been screened and assessed by RD.    Malnutrition Type:  Context: acute illness or injury, social/environmental circumstances  Level: severe    Malnutrition Characteristic Summary:  Weight Loss (Malnutrition): greater than 5% in 1 month  Energy Intake (Malnutrition): less than or equal to 50% for greater than or equal to 1 month    Interventions/Recommendations (treatment strategy):  1.) Liberalize diet to diabetic to allow more food options to encourage intake. 2.) If PO intakes continue to remain poor, suggest regular diet and close monitoring of K+ and Phos levels. 3.) Continue Renvela 2400mg with meals. 4.) sHPT: continue calcitriol as ordered. Monitor PTH monthly and adjust vitamin D analogs as needed. 5.) Suggest renal MVI to aid in replacing vitamins lost in dialysis. 6.) Pt meets ASPEN criteria for severe malnutrition        Final Active Diagnoses:    Diagnosis Date Noted POA    PRINCIPAL PROBLEM:  Post-operative wound abscess [T81.49XA] 10/18/2024 Yes    Severe malnutrition [E43] 10/19/2024 Yes    ESRD on hemodialysis [N18.6, Z99.2] 10/18/2024 Not Applicable    Post-op pain  [G89.18] 10/18/2024 Yes      Problems Resolved During this Admission:       Discharged Condition: good    Disposition: Home or Self Care    Follow Up:   Follow-up Information       Dawson Granado MD Follow up in 1 week(s).    Specialty: Family Medicine  Contact information:  [address]  [place] [address]  [phone]               Galileo Connors MD Follow up in 2 week(s).    Specialties: General Surgery, Colon and Rectal Surgery, Surgery  Contact information:  [address]  [place] [address]  [phone]                           Patient Instructions:      Ambulatory referral/consult to Wound Clinic   Standing Status: Future   Referral Priority: Routine Referral Type: Consultation   Referral Reason: Specialty Services Required   Referred to Provider: NGOZI LIGHT Requested Specialty: Wound Care   Number of Visits Requested: 1     Diet diabetic     Diet renal   Order Comments: Nepro can with meals     Notify your health care provider if you experience any of the following:  temperature >100.4     Notify your health care provider if you experience any of the following:  persistent nausea and vomiting or diarrhea     Notify your health care provider if you experience any of the following:  severe uncontrolled pain     Notify your health care provider if you experience any of the following:  redness, tenderness, or signs of infection (pain, swelling, redness, odor or green/yellow discharge around incision site)     Notify your health care provider if you experience any of the following:  difficulty breathing or increased cough     Notify your health care provider if you experience any of the following:  severe persistent headache     Notify your health care provider if you experience any of the following:  worsening rash     Notify your health care provider if you experience any of the following:  persistent dizziness, light-headedness, or visual disturbances     Notify your health care  "provider if you experience any of the following:  increased confusion or weakness     Change dressing (specify)   Order Comments: Dressing change: Cleanse Lt, Groin wound with NS and gauze.  Cover with dry gauze, ABD and secure with paper tape.    Follow-up at outpatient wound clinic as directed.     Activity as tolerated   Order Comments: Fall precautions       Significant Diagnostic Studies: Labs: CMP   Recent Labs   Lab 10/19/24  1000 10/20/24  0502   * 133*   K 4.8 5.1   CL 93* 99   CO2 22* 22*    100   BUN 37* 24*   CREATININE 14.5* 11.5*   CALCIUM 9.7 10.0   PROT 8.2 8.2   ALBUMIN 3.7 3.7   BILITOT 0.4 0.4   ALKPHOS 81 83   AST 17 21   ALT 21 21   ANIONGAP 16 12   , CBC   Recent Labs   Lab 10/19/24  1000 10/20/24  0502   WBC 7.50 7.17   HGB 8.7* 8.6*   HCT 27.5* 28.6*    316   , and INR   Lab Results   Component Value Date    INR 1.1 09/20/2024    INR 1.0 02/02/2023    INR 1.1 10/10/2022     Microbiology: Blood Culture   Lab Results   Component Value Date    LABBLOO No Growth to date 10/17/2024    LABBLOO No Growth to date 10/17/2024    LABBLOO No Growth to date 10/17/2024    LABBLOO No Growth to date 10/17/2024   , Sputum Culture No results found for: "GSRESP", "RESPIRATORYC", Urine Culture    Lab Results   Component Value Date    LABURIN No growth 09/19/2024     Microbiology Results (last 7 days)       Procedure Component Value Units Date/Time    Blood culture x two cultures. Draw prior to antibiotics. [[id number]] Collected: 10/17/24 1853    Order Status: Completed Specimen: Blood from Peripheral, Antecubital, Left Updated: 10/20/24 2032     Blood Culture, Routine No Growth to date      No Growth to date      No Growth to date      No Growth to date    Narrative:      Aerobic and anaerobic  Collection has been rescheduled by AJO at 10/17/2024 18:16 Reason: Dr   in room said to come back  Collection has been rescheduled by AJO at 10/17/2024 18:16 Reason:    in room said to come back    " Blood culture x two cultures. Draw prior to antibiotics. [[id number]] Collected: 10/17/24 1943    Order Status: Completed Specimen: Blood from Peripheral, Hand, Left Updated: 10/20/24 2032     Blood Culture, Routine No Growth to date      No Growth to date      No Growth to date      No Growth to date    Narrative:      Aerobic and anaerobic  Collection has been rescheduled by AJO at 10/17/2024 18:16 Reason: Dr   in room said to come back  Collection has been rescheduled by AJO at 10/17/2024 18:56 Reason: Pt   refusing to be stuck in hand. Come back later for second set.   Collection has been rescheduled by AJO at 10/17/2024 19:40 Reason: RN   called and said she collected the second set  Collection has been rescheduled by AJO at 10/17/2024 18:16 Reason: Dr   in room said to come back  Collection has been rescheduled by AJO at 10/17/2024 18:56 Reason: Pt   refusing to be stuck in hand. Come back later for second set.   Collection has been rescheduled by AJO at 10/17/2024 19:40 Reason: RN   called and said she collected the second set          Pending Diagnostic Studies:       Procedure Component Value Units Date/Time    CBC with Automated Differential [[id number]]     Order Status: Sent Lab Status: No result     Specimen: Blood     Comprehensive Metabolic Panel (CMP) [[id number]]     Order Status: Sent Lab Status: No result     Specimen: Blood     Hepatitis B Surface Antigen [[id number]] Collected: 10/19/24 1000    Order Status: Sent Lab Status: In process Updated: 10/19/24 1006    Specimen: Blood     Hepatitis B surface antibody [[id number]] Collected: 10/19/24 1000    Order Status: Sent Lab Status: In process Updated: 10/19/24 1006    Specimen: Blood     Magnesium [[id number]]     Order Status: Sent Lab Status: No result     Specimen: Blood            Medications:  Reconciled Home Medications:      Medication List        CONTINUE taking these medications      acetaminophen 325 MG tablet  Commonly known as:  TYLENOL  Take 2 tablets (650 mg total) by mouth every 4 (four) hours as needed.     ALPRAZolam 0.25 MG tablet  Commonly known as: XANAX  Take 1 tablet (0.25 mg total) by mouth nightly as needed for Insomnia.     aluminum-magnesium hydroxide-simethicone 200-200-20 mg/5 mL Susp  Commonly known as: MAALOX  Take 30 mLs by mouth 4 (four) times daily as needed.     calcitRIOL 0.25 MCG Cap  Commonly known as: ROCALTROL  Take 1 capsule (0.25 mcg total) by mouth once daily.     calcium carbonate 200 mg calcium (500 mg) chewable tablet  Commonly known as: TUMS  Take 2 tablets (1,000 mg total) by mouth 3 (three) times daily.     cloNIDine 0.3 MG tablet  Commonly known as: CATAPRES  Take 1 tablet (0.3 mg total) by mouth 3 (three) times daily as needed (SBP > 150).     epoetin mily-epbx 20,000 unit/mL injection  Commonly known as: RETACRIT  Inject 0.67 mLs (13,400 Units total) into the skin every Mon, Wed, Fri.     gabapentin 100 MG capsule  Commonly known as: NEURONTIN  Take 1 capsule (100 mg total) by mouth 3 (three) times daily.     * heparin (porcine) 1,000 unit/mL injection  Inject 4 mLs (4,000 Units total) into the vein as needed (line care after dialysis).     * heparin (porcine) 5,000 unit/mL injection  Inject 1 mL (5,000 Units total) into the skin every 8 (eight) hours.     hydrALAZINE 100 MG tablet  Commonly known as: APRESOLINE  Take 1 tablet (100 mg total) by mouth every 8 (eight) hours. Hold for SBP < 120 and before dialysis     HYDROmorphone 4 MG tablet  Commonly known as: DILAUDID  Take 1 tablet (4 mg total) by mouth every 4 (four) hours as needed for Pain.     insulin aspart U-100 100 unit/mL (3 mL) Inpn pen  Commonly known as: NovoLOG  Inject 0-5 Units into the skin before meals and at bedtime as needed (Hyperglycemia).     isosorbide mononitrate 30 MG 24 hr tablet  Commonly known as: IMDUR  Take 1 tablet (30 mg total) by mouth once daily.     ketoconazole 2 % shampoo  Commonly known as: NIZORAL  Apply topically  every other day.     LIDOcaine-prilocaine cream  Commonly known as: EMLA  Apply topically as needed (pre dialysis).     melatonin 3 mg tablet  Commonly known as: MELATIN  Take 2 tablets (6 mg total) by mouth nightly as needed for Insomnia.     metoprolol succinate 50 MG 24 hr tablet  Commonly known as: TOPROL-XL  Take 150 mg by mouth once daily.     miconazole 2 % cream  Commonly known as: MICOTIN  Apply topically 2 (two) times daily.     olmesartan 40 MG tablet  Commonly known as: BENICAR  Take 1 tablet by mouth once daily     ondansetron 4 MG Tbdl  Commonly known as: ZOFRAN-ODT  Take 1 tablet (4 mg total) by mouth every 6 (six) hours as needed (nausea).     oxyCODONE 10 mg 12 hr tablet  Commonly known as: OxyCONTIN  Take 1 tablet (10 mg total) by mouth every 12 (twelve) hours.     oxymetazoline 0.05 % nasal spray  Commonly known as: AFRIN  1 spray by Nasal route 2 (two) times daily.     sevelamer carbonate 800 mg Tab  Commonly known as: RENVELA  Take 2 tablets (1,600 mg total) by mouth 3 (three) times daily with meals.     simethicone 80 MG chewable tablet  Commonly known as: MYLICON  Take 1 tablet (80 mg total) by mouth 3 (three) times daily as needed for Flatulence.           * This list has 2 medication(s) that are the same as other medications prescribed for you. Read the directions carefully, and ask your doctor or other care provider to review them with you.                  Indwelling Lines/Drains at time of discharge:   Lines/Drains/Airways       Drain  Duration                  Hemodialysis AV Fistula Left forearm -- days         Colostomy 09/25/24 1200 LLQ 25 days                    Time spent on the discharge of patient: 33 minutes         Wilfrido Rich MD  Department of Hospital Medicine  [facility]

## 2024-10-21 NOTE — DISCHARGE INSTRUCTIONS
"Continue routine hemodialysis treatments every Monday, Wednesday, Friday as before.    Continue ostomy care as directed.    Other instructions  OSTOMY SUPPLIES FOR HOME USE  Height:  6' 2" (1.88 m)  Weight:  134 kg (295 lb 6.7 oz)  Length of need (1-99 months):  3  Diagnosis:  Colostomy  1 piece system:Qty - Month:  20   Reference Number:  Coloplast Sensuri [place] # [id number]  Skin prep?:  Yes Comment - Brava #[id number]  Odor eliminator?:  Yes Comment - Brava #[id number]  Adhesive remover?:  Yes Comment - Brava Spray #[id number]  Rings?:  Yes Comment - Brava # [id number]  Barrier strips?:  Yes Comment - Brava #[id number]        [facility]  [address] [address]  [phone]    Call [facility] once home health has discharged you from their services to schedule supply delivery      Ostomy Instuctions  Remove pouch using adhesive remove pads or spray or soap and water  Clean stoma with water or may use orange dial liquid soap  Measure stoma with renate provided. Today measure 38i17ha, cut pouch  Apply stoma powder to open area on left side of stoma(mucocutaneous separation), cover with barrier extenders   Blot powder with no sting skin prep and apply no sting skin prep around stoma edges  Remove backing and apply pouch     "

## 2024-10-21 NOTE — PROGRESS NOTES
INPATIENT NEPHROLOGY Progress Note  [facility] NEPHROLOGY    João Ham  10/21/2024    Reason for consultation:  ESRD    Chief Complaint:   Chief Complaint   Patient presents with    Wound Infection     + wound culture for s/p hernia repair     Hyperkalemia     Is currently being dialyzed daily due to potassium level      History of Present Illness:  Per H and P    Patient is a 44 year old male with history of ESDR on dialysis MWF, awaiting kidney transplant, HTN, presented to ED with 3 days history of left groin pain and swelling. States swelling comes every time he cough and is being painful. Patient has low grade fever 99s at home. He has been vomiting bilious fluid last 2-3 days. No diarrhea or abdominal pain.      In the ED CT abdomen showed Left inguinal hernia containing fat and air as well as marked inflammation extending from inside the pelvis, in the hernia and down into the left scrotum. This is consistent with an infected inguinal hernia, possible gangrene. WBC was 14, patient was tachycardic. General surgery was consulted and patient was admitted under hospitalist.     9/19  avf nonfunctional.   No sob or chest pain.  Has post op pain.  AFVSS  9/20  afvss.  Pt doesn't want to see previous vascular surgeon who put his avf in.  No alternative available.  Transfer center called.  Has temporary trialysis catheter.  Patient seen on hemodialysis for uremic clearance and ultrafiltration.    9/21  2.5L off w/HD yesterday.  Tmax 101.8, pressures ok.  Lab results reviewed, Hgb low but better than yesterday.  C/o post op pain, no other complaints.  9/22 POD 5.  VSS, lab results reviewed.  Hgb 7.6, added epo to HD orders.  C/o gas pains, plans to move around more today.  Next HD tomorrow.  9/23 VSS. HD today. Transfuse with next HD as needed if Hgb stil low.  9/24 VSS. Appreciate input from Urology, Gen Surgery, Vascular Surgery, ID on this complex patient. Plan for fistulogram tomorrow. CXR yesterday shows  trialysis line tip in appropriate cocation. Will attempt HD today since we skipped yesterday due to nurse and patient concerns that the line may be malpositioned.  9/25 VSS. s/p cephalic vein dilation by Dr. Robledo yesterday, tolerated HD very well. Hold HD today. Hypotension this AM, low grade fever yesterday, WBC elevated, received IVF bolus and Midodrine. Continues to be infected per Surgery, we can remove trialysis line now that AVF is treated and usable... Consider ICU. Stopped hydralazine and olmesartan, decreased Clonidine to 0.1 BID, not sure what to do with Metoprolol 150mg and Hydralazine 120mg... He may be sob/hypoxic due to anemia, suggest 1 PRBC instead of IVF, since Hgb is 7.    Addendum @ 10:47 AM  Seen at bedside in ICU with Dr. Rodriguez and Dr. Au. Mother present as well. Reviewed imaging. Agree with plan for urgent ex lap, remove central line and place mid line, keep current abx, hold on transfusion and dialysis and reassess later. ABG and fresh set of cx now, re-evalaute later.    Addendum @ 4:36 PM  Discussed briefly with Dr. Connors, dialysis nurse Brant and with ICU nurse. Patient had colostomy placed due to anastomotic leak and had 2 PRBC given. Upon return to floor was hypertensive with SBP > 200 and NPO. Advised Cleviprex drip for now (earlier to day he was on max dose Clonidine, mad dose Hydralazine, max dose Olmesartan, 150mg Metoprolol-XL and 120mg of Imdur - none of which he can get due to NPO) and will do urgent HD with UF 2L or so as tolerated.  9/26  POD 1 s/p . Exploratory laparotomy,. Colon resection end-colostomy, Mobilization of splenic flexure, and left inguinal canal exploration.  Post pain. No sob.  Sleepy.    9/27  AFVSS.  Some post op pain.  No sob.  No angina.   Patient seen on hemodialysis for uremic clearance and ultrafiltration.    9/28 s/p 3.8 liter uf yesterday.     AFVSS.  Post op pain.  Dry cough.  No sob.  Projectile vomiting earlier today per patient  9/30 VSS. HD  today.  10/1 GS note reviewed, working on pain control, tolerating diet  10/2 no major events overnight- due for HD today  10/3 fever, tachycardia, abd pain and stagnant leukocytosis yest- CT with possible fluid collection- may need IR drainage- remains on IV antbx  10/4 fluid collection neg for infection- working on discharge plan to NSR today- will do HD before discharge- pain control ongoing issue- hospital medicine aware  10/5  Plan is now d/c to rehab tomorrow, working on pain control.  2.5L UF w/HD yesterday.  VSS, lab results reviewed.  Added on PO4 level to labs--hasn't had one for several days--resulted at 8.5, so increased renvela dose.  Notified pt that dosing may change based on lab results.  10/6 VSS. OK to dc to rehab.    10/19  seen today on dialysis.  Feels weak and overwhelmed.  States he has lost about 20 lbs, so unsure of dry wt at this time.  Dialysis nurse to UF as tolerated, will monitor BP closely.  10/20  2L off w/HD yesterday, tolerated well.  Hypotensive this am, labs reviewed.  States has been up and around, plan is to go back to rehab tomorrow.  He feels a bit apprehensive re: colostomy care, feels like he needs more education.  No other complaints.  10/21 getting ostomy changed- cleared by surg for discharge home    Plan of Care:    ESRD on HD MWF  --continue dialysis per routine    Anemia of CKD  --Hb drop- bump LOR with HD today  --transfused this admission- no acute transfusion needs today    Secondary HPT  --renal diet  --continue binders with meals    Hypertension  --continue current BP meds  --uf with hd as needed    Hyponatremia  Hyperkalemia  Acidosis  --adjust dialysate  --renal diet    AVF malfunction fixed  --appreciate Vascular eval, s/p fistulogram and cephalic vein stenosis dilation on 9/23 by Dr. Salvador.    Inguinal hernia of left side with gangrene - W/ Colonic perforation with abscess   --S/P Robotic sigmoid resection, Mobilization of splenic flexure, left inguinal  canal exploration, and I&D 9/17 per Dr. Connors   --dose antbx for CrCl < 10/HD  --CT 10/2 with new fluid collection- IR aspiration neg for infection    Thank you for allowing us to participate in this patient's care. We will continue to follow.    Vital Signs:  Temp Readings from Last 3 Encounters:   10/21/24 97.9 °F (36.6 °C)   10/06/24 97.5 °F (36.4 °C) (Oral)   08/22/24 97.3 °F (36.3 °C) (Temporal)       Pulse Readings from Last 3 Encounters:   10/21/24 78   10/06/24 86   08/29/24 (!) 115       BP Readings from Last 3 Encounters:   10/21/24 125/80   10/06/24 102/66   08/29/24 99/68       Weight:  Wt Readings from Last 3 Encounters:   10/19/24 120.5 kg (265 lb 10.5 oz)   09/18/24 134 kg (295 lb 6.7 oz)   08/29/24 131.3 kg (289 lb 7.4 oz)       Medications:  No current facility-administered medications on file prior to encounter.     Current Outpatient Medications on File Prior to Encounter   Medication Sig Dispense Refill    acetaminophen (TYLENOL) 325 MG tablet Take 2 tablets (650 mg total) by mouth every 4 (four) hours as needed.      ALPRAZolam (XANAX) 0.25 MG tablet Take 1 tablet (0.25 mg total) by mouth nightly as needed for Insomnia.      aluminum-magnesium hydroxide-simethicone (MAALOX) 200-200-20 mg/5 mL Susp Take 30 mLs by mouth 4 (four) times daily as needed.      calcitRIOL (ROCALTROL) 0.25 MCG Cap Take 1 capsule (0.25 mcg total) by mouth once daily.      calcium carbonate (TUMS) 200 mg calcium (500 mg) chewable tablet Take 2 tablets (1,000 mg total) by mouth 3 (three) times daily. 180 tablet 11    cloNIDine (CATAPRES) 0.3 MG tablet Take 1 tablet (0.3 mg total) by mouth 3 (three) times daily as needed (SBP > 150).      epoetin mily-epbx (RETACRIT) 20,000 unit/mL injection Inject 0.67 mLs (13,400 Units total) into the skin every Mon, Wed, Fri.      gabapentin (NEURONTIN) 100 MG capsule Take 1 capsule (100 mg total) by mouth 3 (three) times daily.      heparin sodium,porcine (HEPARIN, PORCINE,) 1,000  unit/mL injection Inject 4 mLs (4,000 Units total) into the vein as needed (line care after dialysis).      heparin sodium,porcine (HEPARIN, PORCINE,) 5,000 unit/mL injection Inject 1 mL (5,000 Units total) into the skin every 8 (eight) hours.      hydrALAZINE (APRESOLINE) 100 MG tablet Take 1 tablet (100 mg total) by mouth every 8 (eight) hours. Hold for SBP < 120 and before dialysis      HYDROmorphone (DILAUDID) 4 MG tablet Take 1 tablet (4 mg total) by mouth every 4 (four) hours as needed for Pain.      insulin aspart U-100 (NOVOLOG) 100 unit/mL (3 mL) InPn pen Inject 0-5 Units into the skin before meals and at bedtime as needed (Hyperglycemia).      isosorbide mononitrate (IMDUR) 30 MG 24 hr tablet Take 1 tablet (30 mg total) by mouth once daily.      ketoconazole (NIZORAL) 2 % shampoo Apply topically every other day.      LIDOcaine-prilocaine (EMLA) cream Apply topically as needed (pre dialysis).      melatonin (MELATIN) 3 mg tablet Take 2 tablets (6 mg total) by mouth nightly as needed for Insomnia.      metoprolol succinate (TOPROL-XL) 50 MG 24 hr tablet Take 150 mg by mouth once daily.      miconazole (MICOTIN) 2 % cream Apply topically 2 (two) times daily.      olmesartan (BENICAR) 40 MG tablet Take 1 tablet by mouth once daily (Patient taking differently: Take 40 mg by mouth once daily.) 90 tablet 0    ondansetron (ZOFRAN-ODT) 4 MG TbDL Take 1 tablet (4 mg total) by mouth every 6 (six) hours as needed (nausea).      oxyCODONE (OXYCONTIN) 10 mg 12 hr tablet Take 1 tablet (10 mg total) by mouth every 12 (twelve) hours.      oxymetazoline (AFRIN) 0.05 % nasal spray 1 spray by Nasal route 2 (two) times daily.      sevelamer carbonate (RENVELA) 800 mg Tab Take 2 tablets (1,600 mg total) by mouth 3 (three) times daily with meals. 180 tablet 11    simethicone (MYLICON) 80 MG chewable tablet Take 1 tablet (80 mg total) by mouth 3 (three) times daily as needed for Flatulence.       Scheduled Meds:   0.9% NaCl    Intravenous Once    calcitRIOL  0.25 mcg Oral Daily    calcium carbonate  1,000 mg Oral TID    famotidine  20 mg Oral Daily    gabapentin  100 mg Oral TID    hydrALAZINE  100 mg Oral Q8H    HYDROmorphone  1 mg Intravenous Once    isosorbide mononitrate  30 mg Oral Daily    losartan  100 mg Oral Daily    metoprolol succinate  150 mg Oral Daily    oxyCODONE  20 mg Oral Q12H    polyethylene glycol  17 g Oral BID    senna-docusate 8.6-50 mg  2 tablet Oral BID    sertraline  25 mg Oral Daily    sevelamer carbonate  2,400 mg Oral TID WM     Continuous Infusions:        PRN Meds:.  Current Facility-Administered Medications:     0.9% NaCl, , Intravenous, PRN    acetaminophen, 650 mg, Oral, Q8H PRN    acetaminophen, 650 mg, Oral, Q4H PRN    aluminum-magnesium hydroxide-simethicone, 30 mL, Oral, QID PRN    cloNIDine, 0.3 mg, Oral, TID PRN    dextrose 50%, 12.5 g, Intravenous, PRN    dextrose 50%, 25 g, Intravenous, PRN    diazePAM, 5 mg, Intravenous, Q6H PRN    glucagon (human recombinant), 1 mg, Intramuscular, PRN    glucose, 16 g, Oral, PRN    glucose, 24 g, Oral, PRN    heparin (porcine), 5,000 Units, Intravenous, PRN    HYDROmorphone, 1 mg, Intravenous, Q6H PRN    magnesium oxide, 800 mg, Oral, PRN    magnesium oxide, 800 mg, Oral, PRN    melatonin, 6 mg, Oral, Nightly PRN    naloxone, 0.02 mg, Intravenous, PRN    ondansetron, 4 mg, Intravenous, Q6H PRN    oxyCODONE, 20 mg, Oral, Q6H PRN    potassium bicarbonate, 35 mEq, Oral, PRN    potassium bicarbonate, 50 mEq, Oral, PRN    potassium bicarbonate, 60 mEq, Oral, PRN    potassium, sodium phosphates, 2 packet, Oral, PRN    potassium, sodium phosphates, 2 packet, Oral, PRN    potassium, sodium phosphates, 2 packet, Oral, PRN    sevelamer carbonate, 1,600 mg, Oral, With Snacks    sodium chloride 0.9%, 250 mL, Intravenous, PRN    sodium chloride 0.9%, 3 mL, Intravenous, Q12H PRN    Review of Systems:  Neg    Physical Exam:    /80   Pulse 78   Temp 97.9 °F (36.6 °C)    "Resp 16   Ht 6' 2" (1.88 m)   Wt 120.5 kg (265 lb 10.5 oz)   SpO2 97%   BMI 34.11 kg/m²     General Appearance:    Alert, cooperative, no distress, appears stated age   Head:    Normocephalic, without obvious abnormality, atraumatic   Eyes:    PER, conjunctiva/corneas clear, EOM's intact in both eyes        Throat:   Lips, mucosa, and tongue normal; teeth and gums normal   Back:     Symmetric, no curvature, ROM normal, no CVA tenderness   Lungs:     Clear to auscultation bilaterally, respirations unlabored   Chest wall:    No tenderness or deformity   Heart:    Regular rate and rhythm, S1 and S2 normal, no murmur, rub   or gallop   Abdomen:     Soft, non-tender, bowel sounds active all four quadrants,     no masses, no organomegaly   Extremities:   Extremities normal, atraumatic, no cyanosis or edema   Pulses:   2+ and symmetric all extremities   MSK:   No joint or muscle swelling, tenderness or deformity   Skin:   Skin color, texture, turgor normal, no rashes or lesions   Neurologic:   CNII-XII intact, normal strength and sensation       Throughout.  No flap     Results:  Recent Labs   Lab 10/17/24  1923 10/19/24  1000 10/20/24  0502   * 131* 133*   K 5.0 4.8 5.1   CL 94* 93* 99   CO2 29 22* 22*   BUN 22* 37* 24*   CREATININE 10.1* 14.5* 11.5*   GLU 92 101 100       Recent Labs   Lab 10/17/24  1923 10/19/24  1000 10/20/24  0502   CALCIUM 9.9 9.7 10.0   ALBUMIN 3.9 3.7 3.7   MG  --  2.2 2.2       Recent Labs   Lab 02/02/23  0745 05/19/23  1022 06/19/23  1031   PTH, Intact 225.6 H 335.8 H 319.0 H       Recent Labs   Lab 10/18/24  2009 10/19/24  0729   POCTGLUCOSE 117* 109         Recent Labs   Lab 10/10/22  2005 06/22/23  0446 07/16/24  0510   Hemoglobin A1C 5.5 5.5 6.1       Recent Labs   Lab 10/17/24  1923 10/19/24  1000 10/20/24  0502   WBC 9.91 7.50 7.17   HGB 9.1* 8.7* 8.6*   HCT 30.0* 27.5* 28.6*    326 316   * 100* 101*   MCHC 30.3* 31.6* 30.1*   MONO 13.3  1.3* 14.8  1.1* 18.8*  " 1.4*   EOSINOPHIL 3.7 5.9 7.3       Recent Labs   Lab 10/17/24  1923 10/19/24  1000 10/20/24  0502   BILITOT 0.5 0.4 0.4   PROT 8.7* 8.2 8.2   ALBUMIN 3.9 3.7 3.7   ALKPHOS 92 81 83   ALT 27 21 21   AST 25 17 21       Recent Labs   Lab 10/10/22  1645 01/09/23  1123 06/19/23  1622 07/17/24  0505 09/19/24  1435   Color, UA Yellow   < > Straw Yellow Yellow   Appearance, UA Clear   < > Clear Hazy A Clear   pH, UA 6.0   < > 6.0 6.0 8.0   Specific Gravity, UA 1.025   < > 1.010 1.020 1.010   Protein, UA 2+ A   < > 2+ A 3+ A 2+ A   Glucose, UA Negative   < > Negative Negative Negative   Ketones, UA Negative   < > Negative Negative Negative   Urobilinogen, UA Negative  --   --  Negative Negative   Bilirubin (UA) Negative   < > Negative Negative Negative   Occult Blood UA 1+ A   < > Negative 2+ A 2+ A   Nitrite, UA Negative   < > Negative Negative Negative   RBC, UA 1   < > 0 6 H 4   WBC, UA 0   < > 2 52 H 21 H   Bacteria None   < > None None None   Hyaline Casts, UA 0   < > 0 0 0    < > = values in this interval not displayed.       Recent Labs   Lab 09/17/24  0928 09/25/24  1051 10/17/24  1923   POC PH  --  7.404  --    POC PCO2  --  37.7  --    POC HCO3  --  23.6 L  --    POC PO2  --  87  --    POC SATURATED O2  --  97  --    POC BE  --  -1  --    Sample VENOUS ARTERIAL VENOUS       Microbiology Results (last 7 days)       Procedure Component Value Units Date/Time    Blood culture x two cultures. Draw prior to antibiotics. [[id number]] Collected: 10/17/24 1853    Order Status: Completed Specimen: Blood from Peripheral, Antecubital, Left Updated: 10/20/24 2032     Blood Culture, Routine No Growth to date      No Growth to date      No Growth to date      No Growth to date    Narrative:      Aerobic and anaerobic  Collection has been rescheduled by MONTE at 10/17/2024 18:16 Reason: Dr   in room said to come back  Collection has been rescheduled by MONET at 10/17/2024 18:16 Reason:    in room said to come back    Blood culture  x two cultures. Draw prior to antibiotics. [[id number]] Collected: 10/17/24 1943    Order Status: Completed Specimen: Blood from Peripheral, Hand, Left Updated: 10/20/24 2032     Blood Culture, Routine No Growth to date      No Growth to date      No Growth to date      No Growth to date    Narrative:      Aerobic and anaerobic  Collection has been rescheduled by AJO at 10/17/2024 18:16 Reason: Dr   in room said to come back  Collection has been rescheduled by AJO at 10/17/2024 18:56 Reason: Pt   refusing to be stuck in hand. Come back later for second set.   Collection has been rescheduled by AJO at 10/17/2024 19:40 Reason: RN   called and said she collected the second set  Collection has been rescheduled by AJO at 10/17/2024 18:16 Reason: Dr   in room said to come back  Collection has been rescheduled by AJO at 10/17/2024 18:56 Reason: Pt   refusing to be stuck in hand. Come back later for second set.   Collection has been rescheduled by AJO at 10/17/2024 19:40 Reason: RN   called and said she collected the second set          Patient care time was spent personally by me on the following activities: > 35 minutes  Obtaining a history.  Examination of patient.  Providing medical care at the patients bedside.  Developing a treatment plan with patient or surrogate and bedside caregivers.  Ordering and reviewing laboratory studies, radiographic studies, pulse oximetry.  Ordering and performing treatments and interventions.  Evaluation of patient's response to treatment.  Discussions with consultants while on the unit and immediately available to the patient.  Re-evaluation of the patient's condition.  Documentation in the medical record.      Oni Garcia MD    [facility] [place]  [phone]

## 2024-10-21 NOTE — PLAN OF CARE
Patient cleared for discharge from case management standpoint.  Follow up appointments scheduled and added to AVS.  Chart and discharge orders reviewed.  Patient discharged home with home health and no further case management needs.  [facility]  SOC date 10/22/24      10/21/24 1311   Final Note   Assessment Type Final Discharge Note   Anticipated Discharge Disposition [facility] Resources/Appts/Education Provided Provided patient/caregiver with written discharge plan information;Provided education on problems/symptoms using teachback;Appointments scheduled and added to AVS   Post-Acute Status   Post-Acute Authorization Home Health   Home Health Status Set-up Complete/Auth obtained   Discharge Delays None known at this time

## 2024-10-21 NOTE — PROGRESS NOTES
Pt seen and examined.  Resting comfortably in bed.  NO n/v.    Being d/c home today.    COnt local wound care  F/'u in my office in 1-2 weeks.

## 2024-10-21 NOTE — PROGRESS NOTES
10/21/24 1835   Vital Signs   Temp 98.1 °F (36.7 °C)   Pulse 80   Resp 18   SpO2 98 %   /60   Post-Hemodialysis Assessment   Rinseback Volume (mL) 250 mL   Blood Volume Processed (Liters) 59.8 L   Dialyzer Clearance Moderately streaked   Duration of Treatment 180 minutes   Total UF (mL) 2000 mL   Net Fluid Removal 1500   Patient Response to Treatment tolerated well   Post-Treatment Weight 118.1 kg (260 lb 5.8 oz)   Treatment Weight Change -1.9   Arterial bleeding stop time (min) 5 min   Venous bleeding stop time (min) 5 min   Post-Hemodialysis Comments no problems post tx     Pt educated on access care. Report called to Mikayla WARE

## 2024-10-21 NOTE — PLAN OF CARE
[facility] accepted pt via [facility]; SW attached orders in CarePort and updated pt to dc today; SOC 10/22/24     10/21/24 0923   Post-Acute Status   Post-Acute Authorization Home Health   Home Health Status Pending medical clearance/testing

## 2024-10-21 NOTE — CONSULTS
Ostomy with mucocutaneous separation on at the 1-6 oclock position, stoma red beefy measures [id number].  Instructions given on caring for ostomy and separation.  Supplies given.  Pouches given.   Supplies for home sent to DME by [facility] ostomy saw patient prior admit.   Abdominal wound photo did not upload.dehished or non healing wound with pale red wound bed 20% tan slough, cleaned and dried and packed with aquacel ag,  See pt from 10/18 [id number] tunneling at the 12 oclock position 5cm   Suprapubic incision [id number] red beefy tissue.  Cleaned and pac ked with aquacel ag.  Covered with gauze below photos would not upload into system. Photo of photo with eliezer. Complete skin assessment done.     Notified Dr. Connors of mucocutaneous separation.

## 2024-10-22 ENCOUNTER — PATIENT OUTREACH (OUTPATIENT)
Dept: ADMINISTRATIVE | Facility: CLINIC | Age: 44
End: 2024-10-22
Payer: COMMERCIAL

## 2024-10-22 ENCOUNTER — TELEPHONE (OUTPATIENT)
Dept: TRANSPLANT | Facility: CLINIC | Age: 44
End: 2024-10-22
Payer: COMMERCIAL

## 2024-10-22 LAB
BACTERIA BLD CULT: NORMAL
BACTERIA BLD CULT: NORMAL
HBV SURFACE AB SER QL: NON REACTIVE
HBV SURFACE AG SERPL QL IA: NEGATIVE

## 2024-10-22 NOTE — PROGRESS NOTES
C3 nurse spoke with João Ham  for a TCC post hospital discharge follow up call. The patient has a scheduled HOSFU appointment with PROVIDER, [facility] on 10/29/2024 @ 1:30 PM

## 2024-10-22 NOTE — NURSING
Discharge instructions have been reviewed with pt. He verbalized understanding and had no questions at this time. Midline has been removed from his right upper arm and site is free of bleeding and dressing applied. Pt stated that he empted his own colostomy. Pt in room awaiting a ride from family members.

## 2024-10-22 NOTE — CONSULTS
Chief complaint:  Wound Infection (+ wound culture for s/p hernia repair ) and Hyperkalemia (Is currently being dialyzed daily due to potassium level )      HPI:  João Ham is a 44 y.o. male presenting with a midline abdomen surgical wound and a supra-pubic surgical wound. Pt known to me from [facility] and was transferred to [facility] for further care. Pt is ready to go home and will FU at the wound care clinic. No other complaints today    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     Social Drivers of Health     Financial Resource Strain: Medium Risk (10/18/2024)    Overall Financial Resource Strain (CARDIA)     Difficulty of Paying Living Expenses: Somewhat hard   Food Insecurity: Food Insecurity Present (10/18/2024)    Hunger Vital Sign     Worried About Running Out of Food in the Last Year: Sometimes true     Ran Out of Food in the Last Year: Never true   Transportation Needs: No Transportation Needs (10/18/2024)    TRANSPORTATION NEEDS     Transportation : No   Physical Activity: Sufficiently Active (10/18/2024)    Exercise Vital Sign     Days of Exercise per Week: 7 days     Minutes of Exercise per Session: 30 min   Stress: Stress Concern Present (10/18/2024)    [employer] - Occupational Stress Questionnaire     Feeling of Stress : To some extent   Housing Stability: Low Risk  (10/18/2024)    Housing Stability Vital  Sign     Unable to Pay for Housing in the Last Year: No     Homeless in the Last Year: No       Past Surgical History:   Procedure Laterality Date    ABSCESS DRAINAGE Left 9/17/2024    Procedure: DRAINAGE, ABSCESS, GROIN;  Surgeon: Galileo Connors MD;  Location: [facility] OR;  Service: General;  Laterality: Left;    COLON RESECTION  9/25/2024    Procedure: COLON RESECTION;  Surgeon: Galileo Connors MD;  Location: [facility] OR;  Service: General;;    FISTULOGRAM Bilateral 4/30/2024    Procedure: Fistulogram;  Surgeon: Khoobehi, Ali, MD;  Location: [facility] CATH/EP LAB;  Service: Vascular;  Laterality: Bilateral;    FISTULOGRAM Left 9/24/2024    Procedure: Fistulogram;  Surgeon: Lorraine Salvador MD;  Location: [facility] CATH/EP LAB;  Service: General;  Laterality: Left;    HERNIA REPAIR Right     With mesh    INSERTION, CATHETER, TUNNELED N/A 6/22/2023    Procedure: Insertion,catheter,tunneled;  Surgeon: Everett Caicedo MD;  Location: [facility] OR;  Service: General;  Laterality: N/A;    LAPAROTOMY, EXPLORATORY N/A 9/25/2024    Procedure: LAPAROTOMY, EXPLORATORY;  Surgeon: Galileo Connors MD;  Location: [facility];  Service: General;  Laterality: N/A;    MOBILIZATION OF SPLENIC FLEXURE  9/25/2024    Procedure: MOBILIZATION, SPLENIC FLEXURE;  Surgeon: Galileo Connors MD;  Location: [facility] OR;  Service: General;;    PERCUTANEOUS TRANSLUMINAL ANGIOPLASTY OF ARTERIOVENOUS FISTULA Left 4/30/2024    Procedure: PTA, AV FISTULA;  Surgeon: Khoobehi, Ali, MD;  Location: [facility] CATH/EP LAB;  Service: Vascular;  Laterality: Left;    PERCUTANEOUS TRANSLUMINAL ANGIOPLASTY OF ARTERIOVENOUS FISTULA Left 9/24/2024    Procedure: PTA, AV FISTULA;  Surgeon: Lorraine Salvador MD;  Location: [facility] CATH/EP LAB;  Service: General;  Laterality: Left;    PHLEBOGRAPHY N/A 7/19/2024    Procedure: Venogram;  Surgeon: Khoobehi, Ali, MD;  Location: [facility] CATH/EP LAB;  Service: Vascular;  Laterality: N/A;    REMOVAL, TUNNELED CATH Right 7/19/2024    Procedure: REMOVAL,  TUNNELED CATH;  Surgeon: Khoobehi, Ali, MD;  Location: [facility] CATH/EP LAB;  Service: Vascular;  Laterality: Right;    ROBOT-ASSISTED LAPAROSCOPIC RESECTION OF SIGMOID COLON USING DA DELANO XI N/A 9/17/2024    Procedure: XI ROBOTIC SIGMOID RESECTION, WITH ANASTAMOSIS;  Surgeon: Galileo Connors MD;  Location: [facility] OR;  Service: General;  Laterality: N/A;  possible open have instruments available       No family history on file.    Review of patient's allergies indicates:   Allergen Reactions    Mushroom Swelling     Tongue swells    Tongue swells       Tongue swells       No current facility-administered medications on file prior to encounter.     Current Outpatient Medications on File Prior to Encounter   Medication Sig Dispense Refill    acetaminophen (TYLENOL) 325 MG tablet Take 2 tablets (650 mg total) by mouth every 4 (four) hours as needed.      ALPRAZolam (XANAX) 0.25 MG tablet Take 1 tablet (0.25 mg total) by mouth nightly as needed for Insomnia.      aluminum-magnesium hydroxide-simethicone (MAALOX) 200-200-20 mg/5 mL Susp Take 30 mLs by mouth 4 (four) times daily as needed.      calcitRIOL (ROCALTROL) 0.25 MCG Cap Take 1 capsule (0.25 mcg total) by mouth once daily.      calcium carbonate (TUMS) 200 mg calcium (500 mg) chewable tablet Take 2 tablets (1,000 mg total) by mouth 3 (three) times daily. 180 tablet 11    cloNIDine (CATAPRES) 0.3 MG tablet Take 1 tablet (0.3 mg total) by mouth 3 (three) times daily as needed (SBP > 150).      epoetin mily-epbx (RETACRIT) 20,000 unit/mL injection Inject 0.67 mLs (13,400 Units total) into the skin every Mon, Wed, Fri.      gabapentin (NEURONTIN) 100 MG capsule Take 1 capsule (100 mg total) by mouth 3 (three) times daily.      heparin sodium,porcine (HEPARIN, PORCINE,) 1,000 unit/mL injection Inject 4 mLs (4,000 Units total) into the vein as needed (line care after dialysis).      heparin sodium,porcine (HEPARIN, PORCINE,) 5,000 unit/mL injection Inject 1 mL (5,000  Units total) into the skin every 8 (eight) hours.      hydrALAZINE (APRESOLINE) 100 MG tablet Take 1 tablet (100 mg total) by mouth every 8 (eight) hours. Hold for SBP < 120 and before dialysis      HYDROmorphone (DILAUDID) 4 MG tablet Take 1 tablet (4 mg total) by mouth every 4 (four) hours as needed for Pain.      insulin aspart U-100 (NOVOLOG) 100 unit/mL (3 mL) InPn pen Inject 0-5 Units into the skin before meals and at bedtime as needed (Hyperglycemia).      isosorbide mononitrate (IMDUR) 30 MG 24 hr tablet Take 1 tablet (30 mg total) by mouth once daily.      ketoconazole (NIZORAL) 2 % shampoo Apply topically every other day.      LIDOcaine-prilocaine (EMLA) cream Apply topically as needed (pre dialysis).      melatonin (MELATIN) 3 mg tablet Take 2 tablets (6 mg total) by mouth nightly as needed for Insomnia.      metoprolol succinate (TOPROL-XL) 50 MG 24 hr tablet Take 150 mg by mouth once daily.      miconazole (MICOTIN) 2 % cream Apply topically 2 (two) times daily.      olmesartan (BENICAR) 40 MG tablet Take 1 tablet by mouth once daily (Patient taking differently: Take 40 mg by mouth once daily.) 90 tablet 0    ondansetron (ZOFRAN-ODT) 4 MG TbDL Take 1 tablet (4 mg total) by mouth every 6 (six) hours as needed (nausea).      oxyCODONE (OXYCONTIN) 10 mg 12 hr tablet Take 1 tablet (10 mg total) by mouth every 12 (twelve) hours.      oxymetazoline (AFRIN) 0.05 % nasal spray 1 spray by Nasal route 2 (two) times daily.      sevelamer carbonate (RENVELA) 800 mg Tab Take 2 tablets (1,600 mg total) by mouth 3 (three) times daily with meals. 180 tablet 11    simethicone (MYLICON) 80 MG chewable tablet Take 1 tablet (80 mg total) by mouth 3 (three) times daily as needed for Flatulence.         ROS: As per HPI and below:  Pertinent items are noted in HPI.      Physical Exam:     Vitals:    10/21/24 1800 10/21/24 1830 10/21/24 1835 10/21/24 1941   BP: (!) 101/55 (!) 104/58 115/60 (!) 95/55   Pulse: 77 77 80 85  "  Resp:   18 17   Temp:   98.1 °F (36.7 °C) 98.2 °F (36.8 °C)   TempSrc:       SpO2:   98% 99%   Weight:       Height:           BP  Min: 79/48  Max: 220/123  Temp  Av.7 °F (37.1 °C)  Min: 97 °F (36.1 °C)  Max: 103.5 °F (39.7 °C)  Pulse  Av.2  Min: 52  Max: 143  Resp  Av.7  Min: 9  Max: 33  SpO2  Av.8 %  Min: 89 %  Max: 100 %  Height  Av' 1.5" (186.7 cm)  Min: 5' 11" (180.3 cm)  Max: 6' 2" (188 cm)  Weight  Av.2 kg (284 lb 13 oz)  Min: 120.5 kg (265 lb 10.5 oz)  Max: 137 kg (302 lb 0.5 oz)    Body mass index is 34.11 kg/m².          General:             Well developed, well nourished, no apparent distress  HEENT:              NCAT, no JVD, mucous membranes moist, EOM intact  Cardiovascular:  Regular rate and rhythm, normal S1, normal S2, No murmurs, rubs, or gallops  Respiratory:        Normal breath sounds, no wheezes, no rales, no rhonchi  Abdomen:           Bowel sounds present, non tender, non distended, no masses, no hepatojugular reflux  Extremities:        No clubbing, no cyanosis, no edema  Vascular:            2+ b/l radial.  Peripheral pulses intact.  No carotid bruits.  Neurological:      No focal deficits  Skin:                   No obvious rashes or erythema, supra-pubic and abdominal surgical wounds                    Lab Results   Component Value Date    WBC 6.24 10/21/2024    HGB 8.1 (L) 10/21/2024    HCT 25.9 (L) 10/21/2024     (H) 10/21/2024     10/21/2024     Lab Results   Component Value Date    CHOL 141 10/11/2022     Lab Results   Component Value Date    HDL 39 (L) 10/11/2022     Lab Results   Component Value Date    LDLCALC 88.8 10/11/2022     Lab Results   Component Value Date    TRIG 66 10/11/2022     Lab Results   Component Value Date    CHOLHDL 27.7 10/11/2022     CMP  Recent Labs   Lab 10/21/24  1550   GLU 98   CALCIUM 9.7   ALBUMIN 3.6   PROT 7.7   *   K 5.0   CO2 22*   CL 99   BUN 39*   CREATININE 14.3*   ALKPHOS 73   ALT 29   AST 28 "   BILITOT 0.4      Lab Results   Component Value Date    TSH 1.699 10/10/2022       Assessment and Recommendations       Diagnoses:    Supra-pubic and abdomen surgical wounds    Plan:  NSS wet to dry to the suprs pubic and abdominal surgical wounds daily      Complexity:    moderate

## 2024-10-22 NOTE — NURSING
Patient discharged home via personal vehicle in stable condition. All personal belongings and discharge instructions provided.

## 2024-10-24 ENCOUNTER — TELEPHONE (OUTPATIENT)
Dept: FAMILY MEDICINE | Facility: CLINIC | Age: 44
End: 2024-10-24
Payer: COMMERCIAL

## 2024-10-24 LAB
FUNGUS SPEC CULT: NORMAL

## 2024-10-24 NOTE — TELEPHONE ENCOUNTER
----- Message from Judah sent at 10/24/2024 12:16 PM CDT -----  Contact: Michelle/CRISTIAN  Type:  Same Day Appointment Request    Caller is requesting a same day appointment.  Caller declined first available appointment listed below.      Name of Caller:  Omar  When is the first available appointment?  N/a  Symptoms:  nausea/high BP  Best Call Back Number:  [phone]    Additional Information:   Michelle from [facility] wants PT to be send today.

## 2024-10-24 NOTE — TELEPHONE ENCOUNTER
"Pt states that ELIANE 150/100. He took Dulcolax this morning and he believes it caused his vomiting this morning. Pt states his temp was up and he believes that this was caused by his A/C being off and his dryer off. He states "I've been crying. I can't locate my blood pressure medication. I have just been going through a lot. I don't want to go back to the hospital. I lost 30 pounds while I was in there." Pt states that he no longer feels nauseous and is unable to take his BP med today. His BP has been WNL otherwise. Pt informed that I will be forwarding his message to Dr. Granado  "

## 2024-10-24 NOTE — TELEPHONE ENCOUNTER
Pt states that he was told by the  nurse that he should go to the ER due to foul odor from wound. Pt did not previously mention that his wound had a foul odor. I was unable to speak to the  nurse. Pt states that he currently feels fine. He was offered an appointment with an FUAD. Pt accepted an appointment but advised to head to the ER if his condition worsens. I will also be sending a portal message advising him of any side effects that he should keep an eye out for. Pt verbalized understanding

## 2024-10-25 ENCOUNTER — HOSPITAL ENCOUNTER (INPATIENT)
Facility: HOSPITAL | Age: 44
LOS: 1 days | Discharge: HOME HEALTH | DRG: 862 | End: 2024-10-29
Attending: EMERGENCY MEDICINE | Admitting: INTERNAL MEDICINE
Payer: COMMERCIAL

## 2024-10-25 ENCOUNTER — LAB VISIT (OUTPATIENT)
Dept: LAB | Facility: HOSPITAL | Age: 44
End: 2024-10-25
Payer: COMMERCIAL

## 2024-10-25 ENCOUNTER — OFFICE VISIT (OUTPATIENT)
Dept: FAMILY MEDICINE | Facility: CLINIC | Age: 44
End: 2024-10-25
Payer: COMMERCIAL

## 2024-10-25 VITALS
BODY MASS INDEX: 33.02 KG/M2 | HEIGHT: 74 IN | TEMPERATURE: 98 F | DIASTOLIC BLOOD PRESSURE: 92 MMHG | SYSTOLIC BLOOD PRESSURE: 148 MMHG | WEIGHT: 257.25 LBS | HEART RATE: 108 BPM | OXYGEN SATURATION: 97 %

## 2024-10-25 DIAGNOSIS — N18.6 ESRD (END STAGE RENAL DISEASE): ICD-10-CM

## 2024-10-25 DIAGNOSIS — T81.31XA POSTOPERATIVE WOUND BREAKDOWN: ICD-10-CM

## 2024-10-25 DIAGNOSIS — Z51.89 VISIT FOR WOUND CHECK: Primary | ICD-10-CM

## 2024-10-25 DIAGNOSIS — R00.0 TACHYCARDIA: ICD-10-CM

## 2024-10-25 DIAGNOSIS — R07.9 CHEST PAIN: ICD-10-CM

## 2024-10-25 DIAGNOSIS — N18.6 ANEMIA IN ESRD (END-STAGE RENAL DISEASE): ICD-10-CM

## 2024-10-25 DIAGNOSIS — R53.1 WEAKNESS: ICD-10-CM

## 2024-10-25 DIAGNOSIS — S31.109A OPEN WOUND OF ABDOMINAL WALL, INITIAL ENCOUNTER: ICD-10-CM

## 2024-10-25 DIAGNOSIS — I10 HYPERTENSION, UNSPECIFIED TYPE: ICD-10-CM

## 2024-10-25 DIAGNOSIS — Z91.89 AT RISK FOR PROLONGED QT INTERVAL SYNDROME: ICD-10-CM

## 2024-10-25 DIAGNOSIS — T81.31XD POSTOPERATIVE WOUND DEHISCENCE, SUBSEQUENT ENCOUNTER: ICD-10-CM

## 2024-10-25 DIAGNOSIS — D63.1 ANEMIA IN ESRD (END-STAGE RENAL DISEASE): ICD-10-CM

## 2024-10-25 DIAGNOSIS — S31.109A OPEN WOUND OF ABDOMINAL WALL, INITIAL ENCOUNTER: Primary | ICD-10-CM

## 2024-10-25 DIAGNOSIS — Z48.89 ENCOUNTER FOR POST SURGICAL WOUND CHECK: ICD-10-CM

## 2024-10-25 DIAGNOSIS — G89.18 POSTOPERATIVE PAIN: ICD-10-CM

## 2024-10-25 PROBLEM — Z79.01 ANTICOAGULATED: Status: ACTIVE | Noted: 2024-10-25

## 2024-10-25 PROBLEM — Z86.19 HISTORY OF ESBL E. COLI INFECTION: Status: ACTIVE | Noted: 2024-10-25

## 2024-10-25 PROBLEM — I95.9 HYPOTENSION: Status: ACTIVE | Noted: 2024-10-25

## 2024-10-25 LAB
ALBUMIN SERPL BCP-MCNC: 3.3 G/DL (ref 3.5–5.2)
ALBUMIN SERPL BCP-MCNC: 3.8 G/DL (ref 3.5–5.2)
ALP SERPL-CCNC: 102 U/L (ref 40–150)
ALP SERPL-CCNC: 87 U/L (ref 55–135)
ALT SERPL W/O P-5'-P-CCNC: 57 U/L (ref 10–44)
ALT SERPL W/O P-5'-P-CCNC: 72 U/L (ref 10–44)
ANION GAP SERPL CALC-SCNC: 10 MMOL/L (ref 8–16)
ANION GAP SERPL CALC-SCNC: 18 MMOL/L (ref 8–16)
AST SERPL-CCNC: 46 U/L (ref 10–40)
AST SERPL-CCNC: 55 U/L (ref 10–40)
BASOPHILS # BLD AUTO: 0.03 K/UL (ref 0–0.2)
BASOPHILS # BLD AUTO: 0.04 K/UL (ref 0–0.2)
BASOPHILS NFR BLD: 0.6 % (ref 0–1.9)
BASOPHILS NFR BLD: 0.7 % (ref 0–1.9)
BILIRUB SERPL-MCNC: 0.5 MG/DL (ref 0.1–1)
BILIRUB SERPL-MCNC: 0.5 MG/DL (ref 0.1–1)
BUN SERPL-MCNC: 12 MG/DL (ref 6–20)
BUN SERPL-MCNC: 30 MG/DL (ref 6–20)
CALCIUM SERPL-MCNC: 10 MG/DL (ref 8.7–10.5)
CALCIUM SERPL-MCNC: 8.4 MG/DL (ref 8.7–10.5)
CHLORIDE SERPL-SCNC: 93 MMOL/L (ref 95–110)
CHLORIDE SERPL-SCNC: 96 MMOL/L (ref 95–110)
CO2 SERPL-SCNC: 25 MMOL/L (ref 23–29)
CO2 SERPL-SCNC: 30 MMOL/L (ref 23–29)
CREAT SERPL-MCNC: 14.1 MG/DL (ref 0.5–1.4)
CREAT SERPL-MCNC: 7 MG/DL (ref 0.5–1.4)
CRP SERPL-MCNC: 16.8 MG/L (ref 0–8.2)
DIFFERENTIAL METHOD BLD: ABNORMAL
DIFFERENTIAL METHOD BLD: ABNORMAL
EOSINOPHIL # BLD AUTO: 0.2 K/UL (ref 0–0.5)
EOSINOPHIL # BLD AUTO: 0.3 K/UL (ref 0–0.5)
EOSINOPHIL NFR BLD: 3.2 % (ref 0–8)
EOSINOPHIL NFR BLD: 6.1 % (ref 0–8)
ERYTHROCYTE [DISTWIDTH] IN BLOOD BY AUTOMATED COUNT: 19.3 % (ref 11.5–14.5)
ERYTHROCYTE [DISTWIDTH] IN BLOOD BY AUTOMATED COUNT: 19.4 % (ref 11.5–14.5)
ERYTHROCYTE [SEDIMENTATION RATE] IN BLOOD BY WESTERGREN METHOD: 110 MM/HR (ref 0–10)
EST. GFR  (NO RACE VARIABLE): 4 ML/MIN/1.73 M^2
EST. GFR  (NO RACE VARIABLE): 9.3 ML/MIN/1.73 M^2
GLUCOSE SERPL-MCNC: 101 MG/DL (ref 70–110)
GLUCOSE SERPL-MCNC: 115 MG/DL (ref 70–110)
HCT VFR BLD AUTO: 26.8 % (ref 40–54)
HCT VFR BLD AUTO: 28.4 % (ref 40–54)
HGB BLD-MCNC: 8.5 G/DL (ref 14–18)
HGB BLD-MCNC: 9 G/DL (ref 14–18)
IMM GRANULOCYTES # BLD AUTO: 0.01 K/UL (ref 0–0.04)
IMM GRANULOCYTES # BLD AUTO: 0.01 K/UL (ref 0–0.04)
IMM GRANULOCYTES NFR BLD AUTO: 0.2 % (ref 0–0.5)
IMM GRANULOCYTES NFR BLD AUTO: 0.2 % (ref 0–0.5)
INR PPP: 1 (ref 0.8–1.2)
LACTATE SERPL-SCNC: 1.4 MMOL/L (ref 0.5–1.9)
LACTATE SERPL-SCNC: 2.1 MMOL/L (ref 0.5–2.2)
LDH SERPL L TO P-CCNC: 3.21 MMOL/L (ref 0.5–2.2)
LYMPHOCYTES # BLD AUTO: 1.1 K/UL (ref 1–4.8)
LYMPHOCYTES # BLD AUTO: 1.4 K/UL (ref 1–4.8)
LYMPHOCYTES NFR BLD: 20 % (ref 18–48)
LYMPHOCYTES NFR BLD: 25.1 % (ref 18–48)
MCH RBC QN AUTO: 31.4 PG (ref 27–31)
MCH RBC QN AUTO: 31.4 PG (ref 27–31)
MCHC RBC AUTO-ENTMCNC: 31.7 G/DL (ref 32–36)
MCHC RBC AUTO-ENTMCNC: 31.7 G/DL (ref 32–36)
MCV RBC AUTO: 99 FL (ref 82–98)
MCV RBC AUTO: 99 FL (ref 82–98)
MONOCYTES # BLD AUTO: 0.8 K/UL (ref 0.3–1)
MONOCYTES # BLD AUTO: 0.8 K/UL (ref 0.3–1)
MONOCYTES NFR BLD: 15.4 % (ref 4–15)
MONOCYTES NFR BLD: 15.5 % (ref 4–15)
NEUTROPHILS # BLD AUTO: 2.8 K/UL (ref 1.8–7.7)
NEUTROPHILS # BLD AUTO: 3.2 K/UL (ref 1.8–7.7)
NEUTROPHILS NFR BLD: 52.4 % (ref 38–73)
NEUTROPHILS NFR BLD: 60.6 % (ref 38–73)
NRBC BLD-RTO: 0 /100 WBC
NRBC BLD-RTO: 0 /100 WBC
PLATELET # BLD AUTO: 376 K/UL (ref 150–450)
PLATELET # BLD AUTO: 398 K/UL (ref 150–450)
PMV BLD AUTO: 9 FL (ref 9.2–12.9)
PMV BLD AUTO: 9.2 FL (ref 9.2–12.9)
POTASSIUM SERPL-SCNC: 3.8 MMOL/L (ref 3.5–5.1)
POTASSIUM SERPL-SCNC: 4.8 MMOL/L (ref 3.5–5.1)
PROCALCITONIN SERPL IA-MCNC: 1.39 NG/ML (ref 0–0.5)
PROT SERPL-MCNC: 7.7 G/DL (ref 6–8.4)
PROT SERPL-MCNC: 8.6 G/DL (ref 6–8.4)
PROTHROMBIN TIME: 11.3 SEC (ref 9–12.5)
RBC # BLD AUTO: 2.71 M/UL (ref 4.6–6.2)
RBC # BLD AUTO: 2.87 M/UL (ref 4.6–6.2)
SAMPLE: ABNORMAL
SODIUM SERPL-SCNC: 133 MMOL/L (ref 136–145)
SODIUM SERPL-SCNC: 139 MMOL/L (ref 136–145)
TROPONIN I SERPL HS-MCNC: 12.6 PG/ML (ref 0–14.9)
WBC # BLD AUTO: 5.26 K/UL (ref 3.9–12.7)
WBC # BLD AUTO: 5.41 K/UL (ref 3.9–12.7)

## 2024-10-25 PROCEDURE — G0378 HOSPITAL OBSERVATION PER HR: HCPCS

## 2024-10-25 PROCEDURE — 94761 N-INVAS EAR/PLS OXIMETRY MLT: CPT

## 2024-10-25 PROCEDURE — 85610 PROTHROMBIN TIME: CPT | Performed by: EMERGENCY MEDICINE

## 2024-10-25 PROCEDURE — 85651 RBC SED RATE NONAUTOMATED: CPT | Mod: PO,NTX

## 2024-10-25 PROCEDURE — 83605 ASSAY OF LACTIC ACID: CPT | Mod: 91 | Performed by: EMERGENCY MEDICINE

## 2024-10-25 PROCEDURE — 96365 THER/PROPH/DIAG IV INF INIT: CPT

## 2024-10-25 PROCEDURE — 84145 PROCALCITONIN (PCT): CPT | Performed by: EMERGENCY MEDICINE

## 2024-10-25 PROCEDURE — 87070 CULTURE OTHR SPECIMN AEROBIC: CPT | Performed by: EMERGENCY MEDICINE

## 2024-10-25 PROCEDURE — 36415 COLL VENOUS BLD VENIPUNCTURE: CPT | Mod: PO,TXP

## 2024-10-25 PROCEDURE — 85025 COMPLETE CBC W/AUTO DIFF WBC: CPT | Mod: 91 | Performed by: EMERGENCY MEDICINE

## 2024-10-25 PROCEDURE — 63600175 PHARM REV CODE 636 W HCPCS: Performed by: INTERNAL MEDICINE

## 2024-10-25 PROCEDURE — 36415 COLL VENOUS BLD VENIPUNCTURE: CPT | Performed by: EMERGENCY MEDICINE

## 2024-10-25 PROCEDURE — 94799 UNLISTED PULMONARY SVC/PX: CPT

## 2024-10-25 PROCEDURE — 25000003 PHARM REV CODE 250: Performed by: EMERGENCY MEDICINE

## 2024-10-25 PROCEDURE — 99900035 HC TECH TIME PER 15 MIN (STAT)

## 2024-10-25 PROCEDURE — 87040 BLOOD CULTURE FOR BACTERIA: CPT | Performed by: EMERGENCY MEDICINE

## 2024-10-25 PROCEDURE — 25000003 PHARM REV CODE 250: Performed by: INTERNAL MEDICINE

## 2024-10-25 PROCEDURE — 96366 THER/PROPH/DIAG IV INF ADDON: CPT

## 2024-10-25 PROCEDURE — 80053 COMPREHEN METABOLIC PANEL: CPT | Mod: 91 | Performed by: EMERGENCY MEDICINE

## 2024-10-25 PROCEDURE — 85025 COMPLETE CBC W/AUTO DIFF WBC: CPT | Mod: PO,TXP

## 2024-10-25 PROCEDURE — 96367 TX/PROPH/DG ADDL SEQ IV INF: CPT

## 2024-10-25 PROCEDURE — 63600175 PHARM REV CODE 636 W HCPCS: Performed by: EMERGENCY MEDICINE

## 2024-10-25 PROCEDURE — 87186 SC STD MICRODIL/AGAR DIL: CPT | Mod: 59 | Performed by: EMERGENCY MEDICINE

## 2024-10-25 PROCEDURE — 93010 ELECTROCARDIOGRAM REPORT: CPT | Mod: ,,, | Performed by: INTERNAL MEDICINE

## 2024-10-25 PROCEDURE — 99285 EMERGENCY DEPT VISIT HI MDM: CPT | Mod: 25

## 2024-10-25 PROCEDURE — 99999 PR PBB SHADOW E&M-EST. PATIENT-LVL V: CPT | Mod: PBBFAC,,,

## 2024-10-25 PROCEDURE — 93005 ELECTROCARDIOGRAM TRACING: CPT | Performed by: INTERNAL MEDICINE

## 2024-10-25 PROCEDURE — 84484 ASSAY OF TROPONIN QUANT: CPT | Performed by: EMERGENCY MEDICINE

## 2024-10-25 RX ORDER — ACETAMINOPHEN 325 MG/1
650 TABLET ORAL EVERY 8 HOURS PRN
Status: DISCONTINUED | OUTPATIENT
Start: 2024-10-25 | End: 2024-10-29 | Stop reason: HOSPADM

## 2024-10-25 RX ORDER — ALUMINUM HYDROXIDE, MAGNESIUM HYDROXIDE, AND SIMETHICONE 1200; 120; 1200 MG/30ML; MG/30ML; MG/30ML
30 SUSPENSION ORAL 4 TIMES DAILY PRN
Status: DISCONTINUED | OUTPATIENT
Start: 2024-10-25 | End: 2024-10-29 | Stop reason: HOSPADM

## 2024-10-25 RX ORDER — FAMOTIDINE 20 MG/1
20 TABLET, FILM COATED ORAL DAILY
Status: DISCONTINUED | OUTPATIENT
Start: 2024-10-26 | End: 2024-10-29 | Stop reason: HOSPADM

## 2024-10-25 RX ORDER — ONDANSETRON HYDROCHLORIDE 2 MG/ML
4 INJECTION, SOLUTION INTRAVENOUS EVERY 6 HOURS PRN
Status: DISCONTINUED | OUTPATIENT
Start: 2024-10-25 | End: 2024-10-29 | Stop reason: HOSPADM

## 2024-10-25 RX ORDER — MEROPENEM 1 G/1
1 INJECTION, POWDER, FOR SOLUTION INTRAVENOUS
Status: DISCONTINUED | OUTPATIENT
Start: 2024-10-25 | End: 2024-10-25

## 2024-10-25 RX ORDER — OLMESARTAN MEDOXOMIL 40 MG/1
40 TABLET ORAL
Qty: 90 TABLET | Refills: 0 | Status: CANCELLED | OUTPATIENT
Start: 2024-10-25

## 2024-10-25 RX ORDER — ACETAMINOPHEN 325 MG/1
650 TABLET ORAL EVERY 4 HOURS PRN
Status: DISCONTINUED | OUTPATIENT
Start: 2024-10-25 | End: 2024-10-29 | Stop reason: HOSPADM

## 2024-10-25 RX ORDER — TALC
6 POWDER (GRAM) TOPICAL NIGHTLY PRN
Status: DISCONTINUED | OUTPATIENT
Start: 2024-10-25 | End: 2024-10-29 | Stop reason: HOSPADM

## 2024-10-25 RX ORDER — MEROPENEM 1 G/1
1 INJECTION, POWDER, FOR SOLUTION INTRAVENOUS
Status: DISCONTINUED | OUTPATIENT
Start: 2024-10-26 | End: 2024-10-28

## 2024-10-25 RX ORDER — POLYETHYLENE GLYCOL 3350 17 G/17G
17 POWDER, FOR SOLUTION ORAL DAILY
Status: DISCONTINUED | OUTPATIENT
Start: 2024-10-25 | End: 2024-10-29

## 2024-10-25 RX ORDER — NALOXONE HCL 0.4 MG/ML
0.02 VIAL (ML) INJECTION
Status: DISCONTINUED | OUTPATIENT
Start: 2024-10-25 | End: 2024-10-29 | Stop reason: HOSPADM

## 2024-10-25 RX ORDER — ACETAMINOPHEN 500 MG
5000 TABLET ORAL EVERY MORNING
COMMUNITY

## 2024-10-25 RX ORDER — ENOXAPARIN SODIUM 100 MG/ML
1 INJECTION SUBCUTANEOUS EVERY 24 HOURS
Status: DISCONTINUED | OUTPATIENT
Start: 2024-10-25 | End: 2024-10-26

## 2024-10-25 RX ORDER — HYDROCODONE BITARTRATE AND ACETAMINOPHEN 5; 325 MG/1; MG/1
1 TABLET ORAL EVERY 6 HOURS PRN
Status: DISCONTINUED | OUTPATIENT
Start: 2024-10-25 | End: 2024-10-29 | Stop reason: HOSPADM

## 2024-10-25 RX ORDER — ISOSORBIDE MONONITRATE 120 MG/1
120 TABLET, EXTENDED RELEASE ORAL EVERY MORNING
COMMUNITY

## 2024-10-25 RX ORDER — SODIUM CHLORIDE, SODIUM LACTATE, POTASSIUM CHLORIDE, CALCIUM CHLORIDE 600; 310; 30; 20 MG/100ML; MG/100ML; MG/100ML; MG/100ML
INJECTION, SOLUTION INTRAVENOUS CONTINUOUS
Status: DISCONTINUED | OUTPATIENT
Start: 2024-10-25 | End: 2024-10-26

## 2024-10-25 RX ORDER — FLUCONAZOLE 2 MG/ML
100 INJECTION, SOLUTION INTRAVENOUS
Status: DISCONTINUED | OUTPATIENT
Start: 2024-10-25 | End: 2024-10-28

## 2024-10-25 RX ORDER — HYDRALAZINE HYDROCHLORIDE 100 MG/1
100 TABLET, FILM COATED ORAL EVERY 8 HOURS
Status: ON HOLD
Start: 2024-10-25 | End: 2025-10-25

## 2024-10-25 RX ORDER — OLMESARTAN MEDOXOMIL 40 MG/1
40 TABLET ORAL DAILY
Qty: 90 TABLET | Refills: 0 | Status: ON HOLD | OUTPATIENT
Start: 2024-10-25 | End: 2025-01-23

## 2024-10-25 RX ORDER — OXYCODONE HYDROCHLORIDE 20 MG/1
1 TABLET ORAL EVERY 12 HOURS
COMMUNITY
Start: 2024-10-23 | End: 2024-11-05

## 2024-10-25 RX ADMIN — POLYETHYLENE GLYCOL 3350 17 G: 17 POWDER, FOR SOLUTION ORAL at 09:10

## 2024-10-25 RX ADMIN — SODIUM CHLORIDE, POTASSIUM CHLORIDE, SODIUM LACTATE AND CALCIUM CHLORIDE: 600; 310; 30; 20 INJECTION, SOLUTION INTRAVENOUS at 09:10

## 2024-10-25 RX ADMIN — MEROPENEM 2 G: 2 INJECTION, POWDER, FOR SOLUTION INTRAVENOUS at 08:10

## 2024-10-25 RX ADMIN — SODIUM CHLORIDE, POTASSIUM CHLORIDE, SODIUM LACTATE AND CALCIUM CHLORIDE 1000 ML: 600; 310; 30; 20 INJECTION, SOLUTION INTRAVENOUS at 05:10

## 2024-10-25 RX ADMIN — VANCOMYCIN HYDROCHLORIDE 2000 MG: 500 INJECTION, POWDER, LYOPHILIZED, FOR SOLUTION INTRAVENOUS at 05:10

## 2024-10-25 RX ADMIN — FLUCONAZOLE 100 MG: 2 INJECTION, SOLUTION INTRAVENOUS at 10:10

## 2024-10-25 NOTE — H&P
[facility] - Emergency Dept  Hospital Medicine  History & Physical    Patient Name: João Ham  MRN: [id number]  Patient Class: OP- Observation  Admission Date: 10/25/2024  Attending Physician: Russel Peacock MD   Primary Care Provider: Dawson Granado MD         Patient information was obtained from patient, past medical records, and ER records.     Subjective:     Principal Problem:Postoperative wound breakdown    Chief Complaint:   Chief Complaint   Patient presents with    Wound Check     Had colostomy sx back in September and d/c this week after 45 days. Home health came out yesterday and stated that his wound had a foul smell.         HPI: 44 year old pt getting admitted with suspected intra abdominal infection and draining wound in abdomen  This pt is HD dependent for ESRD and was in this hospital last month  Last month he got admitted with colonic perforation and abscess   He underwent laparotomy/colectomy and eventually colostomy  He went home 96 hrs ago  HH nurse noticed increased drainage from wound area and asked him to come to ER  He is on Eliquis which pt doesn't know why he is taking that  Denied fever but c/o on and off abdominal pain   In the ER today he was hypotensive and tachycardic     Past Medical History:   Diagnosis Date    Allergy     Anemia     Anxiety     CHF (congestive heart failure)     Depression     High blood pressure with chronic kidney disease, stage 5 chronic kidney disease or end stage renal disease     Hypertension        Past Surgical History:   Procedure Laterality Date    ABSCESS DRAINAGE Left 9/17/2024    Procedure: DRAINAGE, ABSCESS, GROIN;  Surgeon: Galileo Connors MD;  Location: [facility] OR;  Service: General;  Laterality: Left;    COLON RESECTION  9/25/2024    Procedure: COLON RESECTION;  Surgeon: Galileo Connors MD;  Location: [facility] OR;  Service: General;;    FISTULOGRAM Bilateral 4/30/2024    Procedure: Fistulogram;  Surgeon: Khoobehi, Ali, MD;   Location: [facility] CATH/EP LAB;  Service: Vascular;  Laterality: Bilateral;    FISTULOGRAM Left 9/24/2024    Procedure: Fistulogram;  Surgeon: Lorraine Salvador MD;  Location: [facility] CATH/EP LAB;  Service: General;  Laterality: Left;    HERNIA REPAIR Right     With mesh    INSERTION, CATHETER, TUNNELED N/A 6/22/2023    Procedure: Insertion,catheter,tunneled;  Surgeon: Everett Caicedo MD;  Location: [facility] OR;  Service: General;  Laterality: N/A;    LAPAROTOMY, EXPLORATORY N/A 9/25/2024    Procedure: LAPAROTOMY, EXPLORATORY;  Surgeon: Galileo Connors MD;  Location: [facility] OR;  Service: General;  Laterality: N/A;    MOBILIZATION OF SPLENIC FLEXURE  9/25/2024    Procedure: MOBILIZATION, SPLENIC FLEXURE;  Surgeon: Galileo Connors MD;  Location: [facility] OR;  Service: General;;    PERCUTANEOUS TRANSLUMINAL ANGIOPLASTY OF ARTERIOVENOUS FISTULA Left 4/30/2024    Procedure: PTA, AV FISTULA;  Surgeon: Khoobehi, Ali, MD;  Location: [facility] CATH/EP LAB;  Service: Vascular;  Laterality: Left;    PERCUTANEOUS TRANSLUMINAL ANGIOPLASTY OF ARTERIOVENOUS FISTULA Left 9/24/2024    Procedure: PTA, AV FISTULA;  Surgeon: Lorraine Salvador MD;  Location: [facility] CATH/EP LAB;  Service: General;  Laterality: Left;    PHLEBOGRAPHY N/A 7/19/2024    Procedure: Venogram;  Surgeon: Khoobehi, Ali, MD;  Location: [facility] CATH/EP LAB;  Service: Vascular;  Laterality: N/A;    REMOVAL, TUNNELED CATH Right 7/19/2024    Procedure: REMOVAL, TUNNELED CATH;  Surgeon: Khoobehi, Ali, MD;  Location: [facility] CATH/EP LAB;  Service: Vascular;  Laterality: Right;    ROBOT-ASSISTED LAPAROSCOPIC RESECTION OF SIGMOID COLON USING DA DELANO XI N/A 9/17/2024    Procedure: XI ROBOTIC SIGMOID RESECTION, WITH ANASTAMOSIS;  Surgeon: Galileo Connors MD;  Location: [facility] OR;  Service: General;  Laterality: N/A;  possible open have instruments available       Review of patient's allergies indicates:   Allergen Reactions    Mushroom Swelling     Tongue swells    Tongue swells       Tongue swells        No current facility-administered medications on file prior to encounter.     Current Outpatient Medications on File Prior to Encounter   Medication Sig    aluminum-magnesium hydroxide-simethicone (MAALOX) 200-200-20 mg/5 mL Susp Take 30 mLs by mouth 4 (four) times daily as needed.    calcitRIOL (ROCALTROL) 0.25 MCG Cap Take 1 capsule (0.25 mcg total) by mouth once daily.    calcium carbonate (TUMS) 200 mg calcium (500 mg) chewable tablet Take 2 tablets (1,000 mg total) by mouth 3 (three) times daily.    cholecalciferol, vitamin D3, 125 mcg (5,000 unit) Tab Take 5,000 Units by mouth every morning.    cloNIDine (CATAPRES) 0.3 MG tablet Take 1 tablet (0.3 mg total) by mouth 3 (three) times daily as needed (SBP > 150).    epoetin mily-epbx (RETACRIT) 20,000 unit/mL injection Inject 0.67 mLs (13,400 Units total) into the skin every Mon, Wed, Fri.    hydrALAZINE (APRESOLINE) 100 MG tablet Take 1 tablet (100 mg total) by mouth every 8 (eight) hours. Hold for SBP < 120 and before dialysis    metoprolol succinate (TOPROL-XL) 50 MG 24 hr tablet Take 150 mg by mouth once daily.    olmesartan (BENICAR) 40 MG tablet Take 1 tablet (40 mg total) by mouth once daily.    oxyCODONE (ROXICODONE) 20 mg Tab immediate release tablet Take 1 tablet by mouth every 12 (twelve) hours.    sevelamer carbonate (RENVELA) 800 mg Tab Take 2 tablets (1,600 mg total) by mouth 3 (three) times daily with meals.    acetaminophen (TYLENOL) 325 MG tablet Take 2 tablets (650 mg total) by mouth every 4 (four) hours as needed. (Patient not taking: Reported on 10/25/2024)    ALPRAZolam (XANAX) 0.25 MG tablet Take 1 tablet (0.25 mg total) by mouth nightly as needed for Insomnia. (Patient not taking: Reported on 10/25/2024)    apixaban (ELIQUIS) 2.5 mg Tab Take 2.5 mg by mouth 2 (two) times daily.    gabapentin (NEURONTIN) 100 MG capsule Take 1 capsule (100 mg total) by mouth 3 (three) times daily. (Patient not taking: Reported on 10/25/2024)    heparin  sodium,porcine (HEPARIN, PORCINE,) 1,000 unit/mL injection Inject 4 mLs (4,000 Units total) into the vein as needed (line care after dialysis).    heparin sodium,porcine (HEPARIN, PORCINE,) 5,000 unit/mL injection Inject 1 mL (5,000 Units total) into the skin every 8 (eight) hours.    HYDROmorphone (DILAUDID) 4 MG tablet Take 1 tablet (4 mg total) by mouth every 4 (four) hours as needed for Pain. (Patient not taking: Reported on 10/25/2024)    insulin aspart U-100 (NOVOLOG) 100 unit/mL (3 mL) InPn pen Inject 0-5 Units into the skin before meals and at bedtime as needed (Hyperglycemia). (Patient not taking: Reported on 10/25/2024)    isosorbide mononitrate (IMDUR) 120 MG 24 hr tablet Take 120 mg by mouth every morning.    isosorbide mononitrate (IMDUR) 30 MG 24 hr tablet Take 1 tablet (30 mg total) by mouth once daily.    ketoconazole (NIZORAL) 2 % shampoo Apply topically every other day. (Patient not taking: Reported on 10/25/2024)    LIDOcaine-prilocaine (EMLA) cream Apply topically as needed (pre dialysis).    melatonin (MELATIN) 3 mg tablet Take 2 tablets (6 mg total) by mouth nightly as needed for Insomnia. (Patient not taking: Reported on 10/25/2024)    miconazole (MICOTIN) 2 % cream Apply topically 2 (two) times daily. (Patient not taking: Reported on 10/25/2024)    ondansetron (ZOFRAN-ODT) 4 MG TbDL Take 1 tablet (4 mg total) by mouth every 6 (six) hours as needed (nausea). (Patient not taking: Reported on 10/25/2024)    oxymetazoline (AFRIN) 0.05 % nasal spray 1 spray by Nasal route 2 (two) times daily. (Patient not taking: Reported on 10/25/2024)    simethicone (MYLICON) 80 MG chewable tablet Take 1 tablet (80 mg total) by mouth 3 (three) times daily as needed for Flatulence.    [DISCONTINUED] hydrALAZINE (APRESOLINE) 100 MG tablet Take 1 tablet (100 mg total) by mouth every 8 (eight) hours. Hold for SBP < 120 and before dialysis    [DISCONTINUED] olmesartan (BENICAR) 40 MG tablet Take 1 tablet by mouth  once daily    [DISCONTINUED] oxyCODONE (OXYCONTIN) 10 mg 12 hr tablet Take 1 tablet (10 mg total) by mouth every 12 (twelve) hours. (Patient not taking: Reported on 10/25/2024)    [DISCONTINUED] oxyCODONE (OXYCONTIN) 20 mg 12 hr tablet Take 1 tablet (20 mg total) by mouth every 12 (twelve) hours. (Patient not taking: Reported on 10/25/2024)     Family History       Problem Relation (Age of Onset)    Hypertension Mother          Tobacco Use    Smoking status: Never     Passive exposure: Never    Smokeless tobacco: Never   Substance and Sexual Activity    Alcohol use: Never    Drug use: Never    Sexual activity: Not Currently     Review of Systems   Constitutional:  Negative for activity change and appetite change.   HENT:  Negative for congestion and dental problem.    Eyes:  Negative for discharge and itching.   Respiratory:  Negative for shortness of breath.    Cardiovascular:  Negative for chest pain.   Gastrointestinal:  Positive for abdominal pain. Negative for abdominal distention.   Endocrine: Negative for cold intolerance.   Genitourinary:  Negative for difficulty urinating and dysuria.   Musculoskeletal:  Negative for arthralgias and back pain.   Skin:  Positive for wound. Negative for color change.   Neurological:  Negative for dizziness and facial asymmetry.   Hematological:  Negative for adenopathy.   Psychiatric/Behavioral:  Negative for agitation and behavioral problems.      Objective:     Vital Signs (Most Recent):  Temp: 98.5 °F (36.9 °C) (10/25/24 1628)  Pulse: 82 (10/25/24 1830)  Resp: 17 (10/25/24 1830)  BP: (!) 93/53 (10/25/24 1830)  SpO2: 95 % (10/25/24 1830) Vital Signs (24h Range):  Temp:  [98.2 °F (36.8 °C)-98.5 °F (36.9 °C)] 98.5 °F (36.9 °C)  Pulse:  [] 82  Resp:  [17-18] 17  SpO2:  [95 %-99 %] 95 %  BP: ()/() 93/53     Weight: 116.1 kg (256 lb)  Body mass index is 32.87 kg/m².     Physical Exam  Vitals and nursing note reviewed.   Constitutional:       Appearance: He is  well-developed.   HENT:      Head: Atraumatic.      Right Ear: External ear normal.      Left Ear: External ear normal.      Nose: Nose normal.      Mouth/Throat:      Mouth: Mucous membranes are moist.   Eyes:      Extraocular Movements: Extraocular movements intact.   Cardiovascular:      Rate and Rhythm: Normal rate.   Pulmonary:      Effort: Pulmonary effort is normal.      Breath sounds: Normal breath sounds.   Abdominal:      Comments: Oozing bandage  Colostomy insitu   Musculoskeletal:         General: Normal range of motion.      Cervical back: Full passive range of motion without pain and normal range of motion.   Skin:     General: Skin is warm.   Neurological:      Mental Status: He is alert and oriented to person, place, and time.   Psychiatric:         Behavior: Behavior normal.                Significant Labs: All pertinent labs within the past 24 hours have been reviewed.  CBC:   Recent Labs   Lab 10/25/24  0904 10/25/24  1710   WBC 5.41 5.26   HGB 9.0* 8.5*   HCT 28.4* 26.8*    376     CMP:   Recent Labs   Lab 10/25/24  0904 10/25/24  1710    133*   K 4.8 3.8   CL 96 93*   CO2 25 30*    115*   BUN 30* 12   CREATININE 14.1* 7.0*   CALCIUM 10.0 8.4*   PROT 8.6* 7.7   ALBUMIN 3.3* 3.8   BILITOT 0.5 0.5   ALKPHOS 102 87   AST 55* 46*   ALT 72* 57*   ANIONGAP 18* 10       Significant Imaging: I have reviewed all pertinent imaging results/findings within the past 24 hours.    Assessment/Plan:     * Postoperative wound breakdown  As mentioned in HPI  Started on iv vanc/iv meropenem/iv diflucan  CT abdomen/pelvis now      History of ESBL E. coli infection    Aerobic Bacterial Culture ESCHERICHIA COLI  From broth only   Abnormal     Susceptibility     Escherichia coli     CULTURE, AEROBIC  (SPECIFY SOURCE)     Amp/Sulbactam 16/8 mcg/mL Intermediate     Ampicillin >16 mcg/mL Resistant     Cefazolin 8 mcg/mL Resistant     Cefepime <=2 mcg/mL Sensitive     Ceftriaxone <=1 mcg/mL Sensitive      Ciprofloxacin <=0.25 mcg/mL Sensitive     Ertapenem <=0.5 mcg/mL Sensitive     Gentamicin <=2 mcg/mL Sensitive     Levofloxacin <=0.5 mcg/mL Sensitive     Meropenem <=1 mcg/mL Sensitive     Piperacillin/Tazo <=8 mcg/mL Sensitive     Tetracycline <=4 mcg/mL Sensitive     Tobramycin <=2 mcg/mL Sensitive     Trimeth/Sulfa <=2/38 mcg/mL Sensitive              Anticoagulated  Change NOAC to SQ lovenox      Hypotension  Need to r/o sepsis       ESRD (end stage renal disease)  Creatine stable for now. BMP reviewed- noted Estimated Creatinine Clearance: 18.4 mL/min (A) (based on SCr of 7 mg/dL (H)). according to latest data. Based on current GFR, CKD stage is end stage.  Monitor UOP and serial BMP and adjust therapy as needed. Renally dose meds. Avoid nephrotoxic medications and procedures.      VTE Risk Mitigation (From admission, onward)           Ordered     enoxaparin injection 120 mg  Every 12 hours (non-standard times)         10/25/24 1903     IP VTE HIGH RISK PATIENT  Once         10/25/24 1757     Place sequential compression device  Until discontinued         10/25/24 1757                       On 10/25/2024, patient should be placed in hospital observation services under my care.             Russel Peacock MD  Department of Hospital Medicine  [facility] - Emergency Dept

## 2024-10-25 NOTE — Clinical Note
Diagnosis: Encounter for post surgical wound check [[id number]]   Future Attending Provider: EUGENE HUNG [[id number]]   Place in Observation: [facility] [[id number]]   Special Needs:: No Special Needs [1]

## 2024-10-25 NOTE — PROGRESS NOTES
[facility]     Subjective:        Patient ID:  [id number]     João Ham is a 44 y.o. male with a past medical history significant for HTN, end stage renal disease on HD, and uninfected inguinal hernia mesh with gangrene resulting in complicated wound, who presents to the clinic for wound check.     Mr. Ham presents today for follow-up after recent hospitalization and multiple surgeries. He reports hospitalization around September 17th due to scrotal edema. Gangrene was discovered in his abdomen, possibly related to previous mesh placement from inguinal hernia repair. He received IV antibiotics during hospitalization, which have been completed. He was discharged but feels frustrated about the possibility of returning to the hospital. He experienced elevated temperature, vomiting, and high blood pressure yesterday. Currently, he denies fever, chills, sweating, or vomiting. He describes decreased appetite and significant weight loss of approximately 30 lbs since hospital admission. He expresses concern about lack of wound care education at hospital discharge. He's receiving home healthcare services for wound management and saw wound care specialists yesterday. Wound care advised patient to go to ED yesterday due to foul smell of wound and symptoms of vomiting and low grade fever. Of note, patient states that he was not changing wound packing the first couple days after discharge due to lack of understanding of how to do so.     He reports not taking  his blood pressure medications consistently as they were taken by family members when retrieving his belongings from the hospital. He has been prescribed Oxycodone 20 mg for pain but reports that he has not taken medication as he received it yesterday. He attends regular dialysis sessions and prefers to complete his scheduled treatment before considering an Emergency Department visit.     DISCHARGE SUMMARY 10/21:   Patient is a 44 year old male with  history of ESRD, was admitted with suspected inguinal hernia gangrene. General surgery was consulted and patient was taken to OR. Found to have colonic perforation due to mesh erosion with an abscess. Patient underwent sigmoid resection and drainage of the abscess, was started on clindamycin, Vancomycin and cefepime. Nephrology was consulted for chronic dialysis. AVF was not functioning, General surgery consulted, trialysis catheter placed. PRBC transfused during dialysis for low Hb 6.6.  While on broad-spectrum coverage, patient is spiking fevers and worsening leukocytosis, id consulted, discontinued clindamycin, vancomycin and cefepime-added daptomycin, Diflucan and Zosyn.  Repeated CT abdomen and pelvis that showed residual abscess/phlegmon and contained perforation. Re-consulted surgery who mentioned likely post op changes. Fistulogram 9/24 by vascular surgery for declotting the AV fistula. Trialysis removed 9/25. Had worsening white count, fevers upto 103.5 and hypotension overnight 9/25.  Also had hemoglobin of 7, with worsening hypoxia up to 7 L (overnight desaturated to 70% with lethargy requiring BiPAP placement), after which nephrology recommended 1 unit PRBC transfusion.  Id, Nephrology, Urology, General surgery were updated about his worsening condition.We CT repeated that showed intraperitoneal free air and findings suggestive of necrotizing fasciitis, general surgery was consulted stat, patient was transferred to ICU.  The surgeon performed open laparotomy and noted fecal contamination of the left lower quadrant indicative of anastomotic disruption.  Colon was resected, and colostomy was created. Patient was placed on full liquid diet, was unable to tolerate and downgraded back to clear liquid diet.  Also complaining of excess mucus and cough.  Patient encouraged to use incentive spirometer as directed, and press pillow to abdomen when coughing.  Pulmonology signed off on 09/29 with recommendation to  continue BiPAP nightly and with naps.  PT/OT recommended high-intensity therapy due to patient's deconditioning and discharge planning was started.  Pain regimen was adjusted.  Tachycardia and stagnant white blood cell count noted with low-grade temps-a CT abdomen and pelvis was pursued on 10/02.  This was revealing for 2 new fluid collections.  These were discussed with general surgery and IR.  IR did do an aspirate of the collection they felt easily accessible, this was negative on Gram stain with cultures pending.  Did not feel either fluid collection with rim enhancing or indicative of an abscess.  General surgery agreed.  Leukocytosis improved.  Temperature is improved.  He had issues with ongoing pain control and long-acting pain regimen was added. Patient cleared for discharge from surgical standpoint with f/u in 1-2 weeks. BERNABE drain removed and surgical site CDI. Patient assessed on day of discharge and stable for discharge. Some overnight epistaxis reported but patient also reported that he was picking and blowing his nose a lot. Educated on nose bleeds and education included in discharge instructions. Patient also instructed to only use Afrin 1-2 times in a 24 hour period and not to exceed 3 days. This was also included in discharge instructions. Patient will continue abx thru 10/9 and then have midline removed. He is to f/u with ID in 3 weeks. He is also to f/u with urology in 2 weeks. Plan discussed with patient and he is agreeable and ready for discharge.       ROS:  General: -fever, -chills, +weight loss, +appetite changes  Cardiac: -chest pain, chest palpitations  Respiratory: +shortness of breath  Neurological: -headache       Past Medical History:   Diagnosis Date    Allergy     Anemia     Anxiety     CHF (congestive heart failure)     Depression     High blood pressure with chronic kidney disease, stage 5 chronic kidney disease or end stage renal disease     Hypertension       Active Problem List  with Overview Notes    Diagnosis Date Noted    Severe malnutrition 10/19/2024    Post-operative wound abscess 10/18/2024    ESRD on hemodialysis 10/18/2024    Post-op pain 10/18/2024    Bleeding from the nose 10/02/2024    Insomnia 10/01/2024    Debility 09/30/2024    Acute hypoxic respiratory failure 09/25/2024    Sepsis 09/25/2024    Patient on waiting list for kidney transplant 08/22/2024    Essential hypertension 07/17/2024    Bacteremia 07/15/2024    Sleep apnea 03/31/2023    Hyperparathyroidism 01/10/2023    Erectile dysfunction 01/10/2023    Pulmonary hypertension 11/29/2022    Hypertensive heart and renal disease with congestive heart failure 11/29/2022    Severe obesity with body mass index (BMI) of 35.0 to 39.9 with comorbidity 10/17/2022    ESRD (end stage renal disease) 10/10/2022    Anemia due to chronic kidney disease, on chronic dialysis 10/10/2022    Persistent proteinuria 10/10/2022      Review of patient's allergies indicates:   Allergen Reactions    Mushroom Swelling     Tongue swells    Tongue swells       Tongue swells       Current Outpatient Medications:     aluminum-magnesium hydroxide-simethicone (MAALOX) 200-200-20 mg/5 mL Susp, Take 30 mLs by mouth 4 (four) times daily as needed., Disp: , Rfl:     calcitRIOL (ROCALTROL) 0.25 MCG Cap, Take 1 capsule (0.25 mcg total) by mouth once daily., Disp: , Rfl:     cloNIDine (CATAPRES) 0.3 MG tablet, Take 1 tablet (0.3 mg total) by mouth 3 (three) times daily as needed (SBP > 150)., Disp: , Rfl:     epoetin mily-epbx (RETACRIT) 20,000 unit/mL injection, Inject 0.67 mLs (13,400 Units total) into the skin every Mon, Wed, Fri., Disp: , Rfl:     heparin sodium,porcine (HEPARIN, PORCINE,) 1,000 unit/mL injection, Inject 4 mLs (4,000 Units total) into the vein as needed (line care after dialysis)., Disp: , Rfl:     heparin sodium,porcine (HEPARIN, PORCINE,) 5,000 unit/mL injection, Inject 1 mL (5,000 Units total) into the skin every 8 (eight) hours., Disp:  , Rfl:     isosorbide mononitrate (IMDUR) 30 MG 24 hr tablet, Take 1 tablet (30 mg total) by mouth once daily., Disp: , Rfl:     LIDOcaine-prilocaine (EMLA) cream, Apply topically as needed (pre dialysis)., Disp: , Rfl:     metoprolol succinate (TOPROL-XL) 50 MG 24 hr tablet, Take 150 mg by mouth once daily., Disp: , Rfl:     sevelamer carbonate (RENVELA) 800 mg Tab, Take 2 tablets (1,600 mg total) by mouth 3 (three) times daily with meals., Disp: 180 tablet, Rfl: 11    simethicone (MYLICON) 80 MG chewable tablet, Take 1 tablet (80 mg total) by mouth 3 (three) times daily as needed for Flatulence., Disp: , Rfl:     acetaminophen (TYLENOL) 325 MG tablet, Take 2 tablets (650 mg total) by mouth every 4 (four) hours as needed. (Patient not taking: Reported on 10/25/2024), Disp: , Rfl:     ALPRAZolam (XANAX) 0.25 MG tablet, Take 1 tablet (0.25 mg total) by mouth nightly as needed for Insomnia. (Patient not taking: Reported on 10/25/2024), Disp: , Rfl:     calcium carbonate (TUMS) 200 mg calcium (500 mg) chewable tablet, Take 2 tablets (1,000 mg total) by mouth 3 (three) times daily., Disp: 180 tablet, Rfl: 11    gabapentin (NEURONTIN) 100 MG capsule, Take 1 capsule (100 mg total) by mouth 3 (three) times daily. (Patient not taking: Reported on 10/25/2024), Disp: , Rfl:     hydrALAZINE (APRESOLINE) 100 MG tablet, Take 1 tablet (100 mg total) by mouth every 8 (eight) hours. Hold for SBP < 120 and before dialysis, Disp: , Rfl:     HYDROmorphone (DILAUDID) 4 MG tablet, Take 1 tablet (4 mg total) by mouth every 4 (four) hours as needed for Pain. (Patient not taking: Reported on 10/25/2024), Disp: , Rfl:     insulin aspart U-100 (NOVOLOG) 100 unit/mL (3 mL) InPn pen, Inject 0-5 Units into the skin before meals and at bedtime as needed (Hyperglycemia). (Patient not taking: Reported on 10/25/2024), Disp: , Rfl:     ketoconazole (NIZORAL) 2 % shampoo, Apply topically every other day. (Patient not taking: Reported on 10/25/2024),  Disp: , Rfl:     melatonin (MELATIN) 3 mg tablet, Take 2 tablets (6 mg total) by mouth nightly as needed for Insomnia. (Patient not taking: Reported on 10/25/2024), Disp: , Rfl:     miconazole (MICOTIN) 2 % cream, Apply topically 2 (two) times daily. (Patient not taking: Reported on 10/25/2024), Disp: , Rfl:     olmesartan (BENICAR) 40 MG tablet, Take 1 tablet (40 mg total) by mouth once daily., Disp: 90 tablet, Rfl: 0    ondansetron (ZOFRAN-ODT) 4 MG TbDL, Take 1 tablet (4 mg total) by mouth every 6 (six) hours as needed (nausea). (Patient not taking: Reported on 10/25/2024), Disp: , Rfl:     oxyCODONE (OXYCONTIN) 10 mg 12 hr tablet, Take 1 tablet (10 mg total) by mouth every 12 (twelve) hours. (Patient not taking: Reported on 10/25/2024), Disp: , Rfl:     oxyCODONE (OXYCONTIN) 20 mg 12 hr tablet, Take 1 tablet (20 mg total) by mouth every 12 (twelve) hours. (Patient not taking: Reported on 10/25/2024), Disp: 10 tablet, Rfl: 0    oxymetazoline (AFRIN) 0.05 % nasal spray, 1 spray by Nasal route 2 (two) times daily. (Patient not taking: Reported on 10/25/2024), Disp: , Rfl:     Lab Results   Component Value Date    WBC 5.41 10/25/2024    HGB 9.0 (L) 10/25/2024    HCT 28.4 (L) 10/25/2024     10/25/2024    CHOL 141 10/11/2022    TRIG 66 10/11/2022    HDL 39 (L) 10/11/2022    ALT 29 10/21/2024    AST 28 10/21/2024     (L) 10/21/2024    K 5.0 10/21/2024    CL 99 10/21/2024    CREATININE 14.3 (H) 10/21/2024    BUN 39 (H) 10/21/2024    CO2 22 (L) 10/21/2024    TSH 1.699 10/10/2022    PSA 0.48 06/25/2024    INR 1.1 09/20/2024    HGBA1C 6.1 07/16/2024        Objective:      Physical Exam  Constitutional:       General: He is not in acute distress.     Appearance: Normal appearance. He is not toxic-appearing.      Comments: Patient appear sad on exam.   HENT:      Head: Normocephalic and atraumatic.      Nose: Nose normal.   Eyes:      Extraocular Movements: Extraocular movements intact.      Conjunctiva/sclera:  Conjunctivae normal.   Cardiovascular:      Rate and Rhythm: Regular rhythm. Tachycardia present.      Heart sounds: No murmur heard.     No friction rub. No gallop.   Pulmonary:      Effort: Pulmonary effort is normal. No respiratory distress.      Breath sounds: Wheezing (scattered) present.   Abdominal:      General: Abdomen is flat.      Palpations: Abdomen is soft.      Tenderness: There is no abdominal tenderness. There is no rebound.          Comments: Wound appreciated over suprapubic region, picture depicted below.   No tenderness appreciated around wound.    Colostomy bag present over mid left quadrant.    Musculoskeletal:         General: Normal range of motion.      Cervical back: Normal range of motion.   Skin:     General: Skin is warm and dry.   Neurological:      General: No focal deficit present.      Mental Status: He is alert and oriented to person, place, and time.   Psychiatric:         Mood and Affect: Mood is depressed.             Assessment:       1. Open wound of abdominal wall, initial encounter    2. Tachycardia    3. Hypertension, unspecified type    4. ESRD (end stage renal disease)          Plan:     - Assessed patient's post-surgical condition and recent hospital discharge  - Evaluated wound status and potential infection risk  - Considered elevated heart rate as a concerning sign despite metoprolol use this morning.   - Reviewed lab results, noting white blood cell count not elevated, Other labs still pending.   - Consulted with Dr. Granado and other faculty regarding patient's case  - Recommend ED visit due to high risk for decompensation.     1. Tachycardia  - Sedimentation rate; Future  - C-REACTIVE PROTEIN; Future  - LACTIC ACID, PLASMA; Future  - CBC W/ AUTO DIFFERENTIAL; Future  - COMPREHENSIVE METABOLIC PANEL; Future  - Refer to Emergency Dept.  - Explained rationale for ED recommendation, including need for wound culture and potential IV antibiotics.  - Discussed likelihood  of infection progression without intervention.  - Emphasized importance of preventing systemic infection.  - Mr. Ham to consider going to the ED after dialysis for further evaluation and treatment referral placed.   - Sp[facility]er consulted. Recommended ED consult as well.     2. Open wound of abdominal wall, initial encounter (Primary)  - Sedimentation rate; Future  - C-REACTIVE PROTEIN; Future  - LACTIC ACID, PLASMA; Future  - CBC W/ AUTO DIFFERENTIAL; Future  - COMPREHENSIVE METABOLIC PANEL; Future  - Refer to Emergency Dept.    3. Hypertension, unspecified type  - hydrALAZINE (APRESOLINE) 100 MG tablet; Take 1 tablet (100 mg total) by mouth every 8 (eight) hours. Hold for SBP < 120 and before dialysis  - olmesartan (BENICAR) 40 MG tablet; Take 1 tablet (40 mg total) by mouth once daily.  Dispense: 90 tablet; Refill: 0    4. ESRD (end stage renal disease)  - hydrALAZINE (APRESOLINE) 100 MG tablet; Take 1 tablet (100 mg total) by mouth every 8 (eight) hours. Hold for SBP < 120 and before dialysis  - olmesartan (BENICAR) 40 MG tablet; Take 1 tablet (40 mg total) by mouth once daily.  Dispense: 90 tablet; Refill: 0          Kim Valenzuela PA-C  Family Medicine Physician Assistant     Future Appointments       Date Provider Specialty Appt Notes    10/25/2024  Lab NONFASTING    10/29/2024  [facility] f/u dc: 10/21 dx: post operative wound abcess    10/31/2024 James Recinos, DO Wound Care WOUND CARE - NEW PATIENT    2/28/2025 Naya Wan, NP Pulmonology 6m f/u           All of your core healthy metrics are met.     I spent a total of 60 minutes on the day of the visit.This includes face to face time and non-face to face time preparing to see the patient (eg, review of tests), obtaining and/or reviewing separately obtained history, documenting clinical information in the electronic or other health record, independently interpreting results and communicating results to the patient/family/caregiver, or  care coordinator.    This note was generated with the assistance of ambient listening technology. Verbal consent was obtained by the patient and accompanying visitor(s) for the recording of patient appointment to facilitate this note. I attest to having reviewed and edited the generated note for accuracy, though some syntax or spelling errors may persist. Please contact the author of this note for any clarification.

## 2024-10-25 NOTE — ED PROVIDER NOTES
Encounter Date: 10/25/2024       History     Chief Complaint   Patient presents with    Wound Check     Had colostomy sx back in September and d/c this week after 45 days. Home health came out yesterday and stated that his wound had a foul smell.      44-year-old male presented emergency department sent by primary care provider for admission.  Patient had abdominal surgery.  Patient is on dialysis and had dialysis today.  Patient had home health nurse change the surgical wound and patient noticed that he had more drainage in that area and followed up with primary care as he was told to and after blood work done at primary care provider's office patient was sent here for admission and further management.  Patient's blood work is close to baseline.  Patient denies any fever or chills or nausea vomiting.  Patient states he feels well.  Patient however is hypotensive and tachycardic and has slight pain at the incision site.  Patient has a colostomy bag in place.  Patient has history of end-stage renal disease and is on dialysis      Review of patient's allergies indicates:   Allergen Reactions    Mushroom Swelling     Tongue swells    Tongue swells       Tongue swells     Past Medical History:   Diagnosis Date    Allergy     Anemia     Anxiety     CHF (congestive heart failure)     Depression     High blood pressure with chronic kidney disease, stage 5 chronic kidney disease or end stage renal disease     Hypertension      Past Surgical History:   Procedure Laterality Date    ABSCESS DRAINAGE Left 9/17/2024    Procedure: DRAINAGE, ABSCESS, GROIN;  Surgeon: Galileo Connors MD;  Location: [facility] OR;  Service: General;  Laterality: Left;    COLON RESECTION  9/25/2024    Procedure: COLON RESECTION;  Surgeon: Galileo Connors MD;  Location: [facility] OR;  Service: General;;    FISTULOGRAM Bilateral 4/30/2024    Procedure: Fistulogram;  Surgeon: Khoobehi, Ali, MD;  Location: [facility] CATH/EP LAB;  Service: Vascular;  Laterality:  Bilateral;    FISTULOGRAM Left 9/24/2024    Procedure: Fistulogram;  Surgeon: Lorraine Salvador MD;  Location: [facility] CATH/EP LAB;  Service: General;  Laterality: Left;    HERNIA REPAIR Right     With mesh    INSERTION, CATHETER, TUNNELED N/A 6/22/2023    Procedure: Insertion,catheter,tunneled;  Surgeon: Everett Caicedo MD;  Location: [facility] OR;  Service: General;  Laterality: N/A;    LAPAROTOMY, EXPLORATORY N/A 9/25/2024    Procedure: LAPAROTOMY, EXPLORATORY;  Surgeon: Galileo Connors MD;  Location: [facility];  Service: General;  Laterality: N/A;    MOBILIZATION OF SPLENIC FLEXURE  9/25/2024    Procedure: MOBILIZATION, SPLENIC FLEXURE;  Surgeon: Galileo Connors MD;  Location: [facility];  Service: General;;    PERCUTANEOUS TRANSLUMINAL ANGIOPLASTY OF ARTERIOVENOUS FISTULA Left 4/30/2024    Procedure: PTA, AV FISTULA;  Surgeon: Khoobehi, Ali, MD;  Location: [facility] CATH/EP LAB;  Service: Vascular;  Laterality: Left;    PERCUTANEOUS TRANSLUMINAL ANGIOPLASTY OF ARTERIOVENOUS FISTULA Left 9/24/2024    Procedure: PTA, AV FISTULA;  Surgeon: Lorraine Salvador MD;  Location: [facility] CATH/EP LAB;  Service: General;  Laterality: Left;    PHLEBOGRAPHY N/A 7/19/2024    Procedure: Venogram;  Surgeon: Khoobehi, Ali, MD;  Location: [facility] CATH/EP LAB;  Service: Vascular;  Laterality: N/A;    REMOVAL, TUNNELED CATH Right 7/19/2024    Procedure: REMOVAL, TUNNELED CATH;  Surgeon: Khoobehi, Ali, MD;  Location: [facility] CATH/EP LAB;  Service: Vascular;  Laterality: Right;    ROBOT-ASSISTED LAPAROSCOPIC RESECTION OF SIGMOID COLON USING DA DELANO XI N/A 9/17/2024    Procedure: XI ROBOTIC SIGMOID RESECTION, WITH ANASTAMOSIS;  Surgeon: Galileo Connors MD;  Location: [facility];  Service: General;  Laterality: N/A;  possible open have instruments available     No family history on file.  Social History     Tobacco Use    Smoking status: Never     Passive exposure: Never    Smokeless tobacco: Never   Substance Use Topics    Alcohol use: Never    Drug use: Never      Review of Systems   Constitutional:  Positive for fatigue.   HENT: Negative.     Eyes: Negative.    Respiratory: Negative.     Cardiovascular: Negative.    Gastrointestinal: Negative.    Endocrine: Negative.    Genitourinary: Negative.    Musculoskeletal: Negative.    Skin:  Positive for wound.   Allergic/Immunologic: Negative.    Neurological: Negative.    Hematological: Negative.    Psychiatric/Behavioral: Negative.     All other systems reviewed and are negative.      Physical Exam     Initial Vitals [10/25/24 1628]   BP Pulse Resp Temp SpO2   (!) 84/56 (!) 113 18 98.5 °F (36.9 °C) 99 %      MAP       --         Physical Exam    Nursing note and vitals reviewed.  Constitutional: He appears well-developed and well-nourished.   HENT:   Head: Normocephalic and atraumatic.   Nose: Nose normal.   Eyes: Conjunctivae and EOM are normal.   Neck: No tracheal deviation present.   Normal range of motion.  Cardiovascular:  Normal rate, regular rhythm, normal heart sounds and intact distal pulses.     Exam reveals no friction rub.       No murmur heard.  Pulmonary/Chest: Breath sounds normal. No respiratory distress. He has no wheezes. He has no rales.   Abdominal: Abdomen is soft. He exhibits no distension. There is abdominal tenderness.   Suprapubic midline incision site with slight drainage and dressing in place.  No redness.  I do not appreciate any foul-smelling drainage at this time.  Patient has slight tenderness at that site however.  Also has colostomy bag with stool and no redness in that area as well.   Musculoskeletal:         General: Normal range of motion.      Cervical back: Normal range of motion.     Neurological: He is alert and oriented to person, place, and time. He has normal strength. No cranial nerve deficit or sensory deficit. GCS score is 15. GCS eye subscore is 4. GCS verbal subscore is 5. GCS motor subscore is 6.   Skin: Skin is warm and dry. Capillary refill takes less than 2 seconds.    Psychiatric: He has a normal mood and affect. Thought content normal.         ED Course   Procedures  Labs Reviewed   CULTURE, BLOOD   CULTURE, BLOOD   CULTURE, AEROBIC  (SPECIFY SOURCE)   CBC W/ AUTO DIFFERENTIAL   COMPREHENSIVE METABOLIC PANEL   LACTIC ACID, PLASMA   URINALYSIS, REFLEX TO URINE CULTURE   PROTIME-INR   PROCALCITONIN   TROPONIN I HIGH SENSITIVITY   POCT LACTATE     EKG Readings: (Independently Interpreted)   Initial Reading: No STEMI. Rhythm: Sinus Tachycardia. Ectopy: No Ectopy. Conduction: Normal.       Imaging Results              X-Ray Chest AP Portable (In process)                      Medications   lactated ringers bolus 3,483 mL (has no administration in time range)   meropenem 2 g in sodium chloride 0.9 % 100 mL IVPB (ready to mix) (has no administration in time range)   vancomycin - pharmacy to dose (has no administration in time range)     Medical Decision Making  44-year-old male with pain at the postsurgical site and slight drainage at the incision site.  Patient was sent by primary care provider for admission.  Given patient hypotensive and tachycardic will start sepsis workup.  Patient is on dialysis so fluids will be gently given.  Broad-spectrum antibiotics started.  Labs already done earlier today and so Hospital Medicine consulted who agreed to admit patient.  Will do CT scan for further evaluation of patient's symptoms.  Patient otherwise nontoxic and hemodynamically stable other than slight hypotension which likely is secondary to patient receiving dialysis prior to arrival.  Patient otherwise nontoxic appearing.  Hospital Medicine to evaluate patient for further management and also to follow CT of the abdomen which is pending at this time.  General surgery to be consulted in the hospital and I do not suspect an acute abscess at this time.    Amount and/or Complexity of Data Reviewed  Labs: ordered. Decision-making details documented in ED Course.  Radiology:  ordered.  ECG/medicine tests: ordered and independent interpretation performed. Decision-making details documented in ED Course.    Risk  Decision regarding hospitalization.                                      Clinical Impression:  Final diagnoses:  [R53.1] Weakness  [Z51.89] Visit for wound check (Primary)  [G89.18] Postoperative pain          ED Disposition Condition    Admit Jc June MD  10/25/24 1268

## 2024-10-25 NOTE — PLAN OF CARE
[facility] Plan of Care Note    Referral source: Primary Care Provider     Patient presented to office tachycardic and with a wound.  He had had a complicated hospital visit recently involving gangrene of his abdomen.  Patient did not want to go back to the hospital but provider at clinic was concerned.  I agree with his concern based off of the abnormal vital signs.  The wound does not look infected externally but in reviewing his record he had concerning findings this month.  Patient was agreeable to go to hospital after receiving dialysis today.

## 2024-10-26 LAB
ALBUMIN SERPL BCP-MCNC: 3.6 G/DL (ref 3.5–5.2)
ALP SERPL-CCNC: 78 U/L (ref 55–135)
ALT SERPL W/O P-5'-P-CCNC: 43 U/L (ref 10–44)
ANION GAP SERPL CALC-SCNC: 10 MMOL/L (ref 8–16)
AST SERPL-CCNC: 34 U/L (ref 10–40)
BASOPHILS # BLD AUTO: 0.02 K/UL (ref 0–0.2)
BASOPHILS NFR BLD: 0.3 % (ref 0–1.9)
BILIRUB SERPL-MCNC: 0.4 MG/DL (ref 0.1–1)
BUN SERPL-MCNC: 16 MG/DL (ref 6–20)
CALCIUM SERPL-MCNC: 8.5 MG/DL (ref 8.7–10.5)
CHLORIDE SERPL-SCNC: 95 MMOL/L (ref 95–110)
CO2 SERPL-SCNC: 28 MMOL/L (ref 23–29)
CREAT SERPL-MCNC: 9.3 MG/DL (ref 0.5–1.4)
DIFFERENTIAL METHOD BLD: ABNORMAL
EOSINOPHIL # BLD AUTO: 0.3 K/UL (ref 0–0.5)
EOSINOPHIL NFR BLD: 4.5 % (ref 0–8)
ERYTHROCYTE [DISTWIDTH] IN BLOOD BY AUTOMATED COUNT: 19.1 % (ref 11.5–14.5)
EST. GFR  (NO RACE VARIABLE): 6.6 ML/MIN/1.73 M^2
GLUCOSE SERPL-MCNC: 87 MG/DL (ref 70–110)
HCT VFR BLD AUTO: 26.2 % (ref 40–54)
HGB BLD-MCNC: 8 G/DL (ref 14–18)
IMM GRANULOCYTES # BLD AUTO: 0.02 K/UL (ref 0–0.04)
IMM GRANULOCYTES NFR BLD AUTO: 0.3 % (ref 0–0.5)
LACTATE SERPL-SCNC: 1 MMOL/L (ref 0.5–1.9)
LYMPHOCYTES # BLD AUTO: 1.4 K/UL (ref 1–4.8)
LYMPHOCYTES NFR BLD: 21.6 % (ref 18–48)
MAGNESIUM SERPL-MCNC: 1.7 MG/DL (ref 1.6–2.6)
MCH RBC QN AUTO: 30.5 PG (ref 27–31)
MCHC RBC AUTO-ENTMCNC: 30.5 G/DL (ref 32–36)
MCV RBC AUTO: 100 FL (ref 82–98)
MONOCYTES # BLD AUTO: 0.9 K/UL (ref 0.3–1)
MONOCYTES NFR BLD: 14.6 % (ref 4–15)
NEUTROPHILS # BLD AUTO: 3.8 K/UL (ref 1.8–7.7)
NEUTROPHILS NFR BLD: 58.7 % (ref 38–73)
NRBC BLD-RTO: 0 /100 WBC
PLATELET # BLD AUTO: 380 K/UL (ref 150–450)
PMV BLD AUTO: 9.1 FL (ref 9.2–12.9)
POTASSIUM SERPL-SCNC: 4.3 MMOL/L (ref 3.5–5.1)
PROCALCITONIN SERPL IA-MCNC: 1.57 NG/ML (ref 0–0.5)
PROT SERPL-MCNC: 7 G/DL (ref 6–8.4)
RBC # BLD AUTO: 2.62 M/UL (ref 4.6–6.2)
SODIUM SERPL-SCNC: 133 MMOL/L (ref 136–145)
WBC # BLD AUTO: 6.39 K/UL (ref 3.9–12.7)

## 2024-10-26 PROCEDURE — G0378 HOSPITAL OBSERVATION PER HR: HCPCS

## 2024-10-26 PROCEDURE — 63600175 PHARM REV CODE 636 W HCPCS: Performed by: INTERNAL MEDICINE

## 2024-10-26 PROCEDURE — 63600175 PHARM REV CODE 636 W HCPCS: Performed by: EMERGENCY MEDICINE

## 2024-10-26 PROCEDURE — 25000003 PHARM REV CODE 250: Performed by: INTERNAL MEDICINE

## 2024-10-26 PROCEDURE — 96361 HYDRATE IV INFUSION ADD-ON: CPT

## 2024-10-26 PROCEDURE — 83735 ASSAY OF MAGNESIUM: CPT | Performed by: INTERNAL MEDICINE

## 2024-10-26 PROCEDURE — 96366 THER/PROPH/DIAG IV INF ADDON: CPT

## 2024-10-26 PROCEDURE — 96372 THER/PROPH/DIAG INJ SC/IM: CPT | Performed by: INTERNAL MEDICINE

## 2024-10-26 PROCEDURE — 99900031 HC PATIENT EDUCATION (STAT)

## 2024-10-26 PROCEDURE — 85025 COMPLETE CBC W/AUTO DIFF WBC: CPT | Performed by: INTERNAL MEDICINE

## 2024-10-26 PROCEDURE — 96376 TX/PRO/DX INJ SAME DRUG ADON: CPT

## 2024-10-26 PROCEDURE — 83605 ASSAY OF LACTIC ACID: CPT | Performed by: INTERNAL MEDICINE

## 2024-10-26 PROCEDURE — 36415 COLL VENOUS BLD VENIPUNCTURE: CPT | Performed by: INTERNAL MEDICINE

## 2024-10-26 PROCEDURE — 84145 PROCALCITONIN (PCT): CPT | Performed by: INTERNAL MEDICINE

## 2024-10-26 PROCEDURE — 80053 COMPREHEN METABOLIC PANEL: CPT | Performed by: INTERNAL MEDICINE

## 2024-10-26 RX ORDER — ENOXAPARIN SODIUM 150 MG/ML
1 INJECTION SUBCUTANEOUS EVERY 24 HOURS
Status: DISCONTINUED | OUTPATIENT
Start: 2024-10-26 | End: 2024-10-28

## 2024-10-26 RX ADMIN — SODIUM CHLORIDE, POTASSIUM CHLORIDE, SODIUM LACTATE AND CALCIUM CHLORIDE: 600; 310; 30; 20 INJECTION, SOLUTION INTRAVENOUS at 06:10

## 2024-10-26 RX ADMIN — MEROPENEM 1 G: 1 INJECTION, POWDER, FOR SOLUTION INTRAVENOUS at 09:10

## 2024-10-26 RX ADMIN — ENOXAPARIN SODIUM 120 MG: 60 INJECTION SUBCUTANEOUS at 12:10

## 2024-10-26 RX ADMIN — MEROPENEM 1 G: 1 INJECTION, POWDER, FOR SOLUTION INTRAVENOUS at 08:10

## 2024-10-26 RX ADMIN — FLUCONAZOLE 100 MG: 2 INJECTION, SOLUTION INTRAVENOUS at 09:10

## 2024-10-26 RX ADMIN — POLYETHYLENE GLYCOL 3350 17 G: 17 POWDER, FOR SOLUTION ORAL at 09:10

## 2024-10-26 NOTE — HOSPITAL COURSE
João Ham was monitored closely during his hospitalization.  CT abdomen and pelvis performed showed postsurgical changes in the left inguinal canal with associated infectious/inflammatory fluid and edema and questionable small abscess consistent with prior CT. He was hypotensive on presentation which improved with IVF.  He was placed on IV abx.  General surgery was consulted, and recommended no surgical intervention.  Patient to continue Dialysis MWF. Aerobic cultures +Pseudomonas and E. Coli sensitive to Merrem, Vancomycin discontinued.  Infectious Disease was consulted change the patient to Cipro times 10 days with outpatient follow up in 2 weeks.  Wound care was performed prior to patient being discharged, and was educated by wound care nurse on dressing changes and ostomy care.  Patient is a follow with wound care clinic on 10/31/2024.  Referral to home health agencies were sent by case management, they will call him pending acceptance.  Case management following for discharge planning.    Patient seen and examined on day of discharge.  Patient in no acute distress.  Patient deemed appropriate for discharge, in his in agreeance.  Patient was educated on discharge planning, he verbalized understanding.  Patient is to follow up with discharge clinic.  Medications were sent to patient's preferred pharmacy.  Patient is safely discharged home.

## 2024-10-26 NOTE — SUBJECTIVE & OBJECTIVE
Past Medical History:   Diagnosis Date    Allergy     Anemia     Anxiety     CHF (congestive heart failure)     Depression     High blood pressure with chronic kidney disease, stage 5 chronic kidney disease or end stage renal disease     Hypertension        Past Surgical History:   Procedure Laterality Date    ABSCESS DRAINAGE Left 9/17/2024    Procedure: DRAINAGE, ABSCESS, GROIN;  Surgeon: Galileo Connors MD;  Location: [facility] OR;  Service: General;  Laterality: Left;    COLON RESECTION  9/25/2024    Procedure: COLON RESECTION;  Surgeon: Galileo Connors MD;  Location: [facility] OR;  Service: General;;    FISTULOGRAM Bilateral 4/30/2024    Procedure: Fistulogram;  Surgeon: Khoobehi, Ali, MD;  Location: [facility] CATH/EP LAB;  Service: Vascular;  Laterality: Bilateral;    FISTULOGRAM Left 9/24/2024    Procedure: Fistulogram;  Surgeon: Lorraine Salvador MD;  Location: [facility] CATH/EP LAB;  Service: General;  Laterality: Left;    HERNIA REPAIR Right     With mesh    INSERTION, CATHETER, TUNNELED N/A 6/22/2023    Procedure: Insertion,catheter,tunneled;  Surgeon: Everett Caicedo MD;  Location: [facility] OR;  Service: General;  Laterality: N/A;    LAPAROTOMY, EXPLORATORY N/A 9/25/2024    Procedure: LAPAROTOMY, EXPLORATORY;  Surgeon: Galileo Connors MD;  Location: [facility];  Service: General;  Laterality: N/A;    MOBILIZATION OF SPLENIC FLEXURE  9/25/2024    Procedure: MOBILIZATION, SPLENIC FLEXURE;  Surgeon: Galileo Connors MD;  Location: [facility] OR;  Service: General;;    PERCUTANEOUS TRANSLUMINAL ANGIOPLASTY OF ARTERIOVENOUS FISTULA Left 4/30/2024    Procedure: PTA, AV FISTULA;  Surgeon: Khoobehi, Ali, MD;  Location: [facility] CATH/EP LAB;  Service: Vascular;  Laterality: Left;    PERCUTANEOUS TRANSLUMINAL ANGIOPLASTY OF ARTERIOVENOUS FISTULA Left 9/24/2024    Procedure: PTA, AV FISTULA;  Surgeon: Lorraine Salvador MD;  Location: [facility] CATH/EP LAB;  Service: General;  Laterality: Left;    PHLEBOGRAPHY N/A 7/19/2024    Procedure: Venogram;   Surgeon: Khoobehi, Ali, MD;  Location: [facility] CATH/EP LAB;  Service: Vascular;  Laterality: N/A;    REMOVAL, TUNNELED CATH Right 7/19/2024    Procedure: REMOVAL, TUNNELED CATH;  Surgeon: Khoobehi, Ali, MD;  Location: [facility] CATH/EP LAB;  Service: Vascular;  Laterality: Right;    ROBOT-ASSISTED LAPAROSCOPIC RESECTION OF SIGMOID COLON USING DA DELANO XI N/A 9/17/2024    Procedure: XI ROBOTIC SIGMOID RESECTION, WITH ANASTAMOSIS;  Surgeon: Galileo Connors MD;  Location: [facility] OR;  Service: General;  Laterality: N/A;  possible open have instruments available       Review of patient's allergies indicates:   Allergen Reactions    Mushroom Swelling     Tongue swells    Tongue swells       Tongue swells       No current facility-administered medications on file prior to encounter.     Current Outpatient Medications on File Prior to Encounter   Medication Sig    aluminum-magnesium hydroxide-simethicone (MAALOX) 200-200-20 mg/5 mL Susp Take 30 mLs by mouth 4 (four) times daily as needed.    calcitRIOL (ROCALTROL) 0.25 MCG Cap Take 1 capsule (0.25 mcg total) by mouth once daily.    calcium carbonate (TUMS) 200 mg calcium (500 mg) chewable tablet Take 2 tablets (1,000 mg total) by mouth 3 (three) times daily.    cholecalciferol, vitamin D3, 125 mcg (5,000 unit) Tab Take 5,000 Units by mouth every morning.    cloNIDine (CATAPRES) 0.3 MG tablet Take 1 tablet (0.3 mg total) by mouth 3 (three) times daily as needed (SBP > 150).    epoetin mily-epbx (RETACRIT) 20,000 unit/mL injection Inject 0.67 mLs (13,400 Units total) into the skin every Mon, Wed, Fri.    hydrALAZINE (APRESOLINE) 100 MG tablet Take 1 tablet (100 mg total) by mouth every 8 (eight) hours. Hold for SBP < 120 and before dialysis    metoprolol succinate (TOPROL-XL) 50 MG 24 hr tablet Take 150 mg by mouth once daily.    olmesartan (BENICAR) 40 MG tablet Take 1 tablet (40 mg total) by mouth once daily.    oxyCODONE (ROXICODONE) 20 mg Tab immediate release tablet Take 1  tablet by mouth every 12 (twelve) hours.    sevelamer carbonate (RENVELA) 800 mg Tab Take 2 tablets (1,600 mg total) by mouth 3 (three) times daily with meals.    acetaminophen (TYLENOL) 325 MG tablet Take 2 tablets (650 mg total) by mouth every 4 (four) hours as needed. (Patient not taking: Reported on 10/25/2024)    ALPRAZolam (XANAX) 0.25 MG tablet Take 1 tablet (0.25 mg total) by mouth nightly as needed for Insomnia. (Patient not taking: Reported on 10/25/2024)    apixaban (ELIQUIS) 2.5 mg Tab Take 2.5 mg by mouth 2 (two) times daily.    gabapentin (NEURONTIN) 100 MG capsule Take 1 capsule (100 mg total) by mouth 3 (three) times daily. (Patient not taking: Reported on 10/25/2024)    heparin sodium,porcine (HEPARIN, PORCINE,) 1,000 unit/mL injection Inject 4 mLs (4,000 Units total) into the vein as needed (line care after dialysis).    heparin sodium,porcine (HEPARIN, PORCINE,) 5,000 unit/mL injection Inject 1 mL (5,000 Units total) into the skin every 8 (eight) hours.    HYDROmorphone (DILAUDID) 4 MG tablet Take 1 tablet (4 mg total) by mouth every 4 (four) hours as needed for Pain. (Patient not taking: Reported on 10/25/2024)    insulin aspart U-100 (NOVOLOG) 100 unit/mL (3 mL) InPn pen Inject 0-5 Units into the skin before meals and at bedtime as needed (Hyperglycemia). (Patient not taking: Reported on 10/25/2024)    isosorbide mononitrate (IMDUR) 120 MG 24 hr tablet Take 120 mg by mouth every morning.    isosorbide mononitrate (IMDUR) 30 MG 24 hr tablet Take 1 tablet (30 mg total) by mouth once daily.    ketoconazole (NIZORAL) 2 % shampoo Apply topically every other day. (Patient not taking: Reported on 10/25/2024)    LIDOcaine-prilocaine (EMLA) cream Apply topically as needed (pre dialysis).    melatonin (MELATIN) 3 mg tablet Take 2 tablets (6 mg total) by mouth nightly as needed for Insomnia. (Patient not taking: Reported on 10/25/2024)    miconazole (MICOTIN) 2 % cream Apply topically 2 (two) times daily.  (Patient not taking: Reported on 10/25/2024)    ondansetron (ZOFRAN-ODT) 4 MG TbDL Take 1 tablet (4 mg total) by mouth every 6 (six) hours as needed (nausea). (Patient not taking: Reported on 10/25/2024)    oxymetazoline (AFRIN) 0.05 % nasal spray 1 spray by Nasal route 2 (two) times daily. (Patient not taking: Reported on 10/25/2024)    simethicone (MYLICON) 80 MG chewable tablet Take 1 tablet (80 mg total) by mouth 3 (three) times daily as needed for Flatulence.    [DISCONTINUED] hydrALAZINE (APRESOLINE) 100 MG tablet Take 1 tablet (100 mg total) by mouth every 8 (eight) hours. Hold for SBP < 120 and before dialysis    [DISCONTINUED] olmesartan (BENICAR) 40 MG tablet Take 1 tablet by mouth once daily    [DISCONTINUED] oxyCODONE (OXYCONTIN) 10 mg 12 hr tablet Take 1 tablet (10 mg total) by mouth every 12 (twelve) hours. (Patient not taking: Reported on 10/25/2024)    [DISCONTINUED] oxyCODONE (OXYCONTIN) 20 mg 12 hr tablet Take 1 tablet (20 mg total) by mouth every 12 (twelve) hours. (Patient not taking: Reported on 10/25/2024)     Family History       Problem Relation (Age of Onset)    Hypertension Mother          Tobacco Use    Smoking status: Never     Passive exposure: Never    Smokeless tobacco: Never   Substance and Sexual Activity    Alcohol use: Never    Drug use: Never    Sexual activity: Not Currently     Review of Systems   Constitutional:  Negative for activity change and appetite change.   HENT:  Negative for congestion and dental problem.    Eyes:  Negative for discharge and itching.   Respiratory:  Negative for shortness of breath.    Cardiovascular:  Negative for chest pain.   Gastrointestinal:  Positive for abdominal pain. Negative for abdominal distention.   Endocrine: Negative for cold intolerance.   Genitourinary:  Negative for difficulty urinating and dysuria.   Musculoskeletal:  Negative for arthralgias and back pain.   Skin:  Positive for wound. Negative for color change.   Neurological:   Negative for dizziness and facial asymmetry.   Hematological:  Negative for adenopathy.   Psychiatric/Behavioral:  Negative for agitation and behavioral problems.      Objective:     Vital Signs (Most Recent):  Temp: 98.5 °F (36.9 °C) (10/25/24 1628)  Pulse: 82 (10/25/24 1830)  Resp: 17 (10/25/24 1830)  BP: (!) 93/53 (10/25/24 1830)  SpO2: 95 % (10/25/24 1830) Vital Signs (24h Range):  Temp:  [98.2 °F (36.8 °C)-98.5 °F (36.9 °C)] 98.5 °F (36.9 °C)  Pulse:  [] 82  Resp:  [17-18] 17  SpO2:  [95 %-99 %] 95 %  BP: ()/() 93/53     Weight: 116.1 kg (256 lb)  Body mass index is 32.87 kg/m².     Physical Exam  Vitals and nursing note reviewed.   Constitutional:       Appearance: He is well-developed.   HENT:      Head: Atraumatic.      Right Ear: External ear normal.      Left Ear: External ear normal.      Nose: Nose normal.      Mouth/Throat:      Mouth: Mucous membranes are moist.   Eyes:      Extraocular Movements: Extraocular movements intact.   Cardiovascular:      Rate and Rhythm: Normal rate.   Pulmonary:      Effort: Pulmonary effort is normal.      Breath sounds: Normal breath sounds.   Abdominal:      Comments: Oozing bandage  Colostomy insitu   Musculoskeletal:         General: Normal range of motion.      Cervical back: Full passive range of motion without pain and normal range of motion.   Skin:     General: Skin is warm.   Neurological:      Mental Status: He is alert and oriented to person, place, and time.   Psychiatric:         Behavior: Behavior normal.                Significant Labs: All pertinent labs within the past 24 hours have been reviewed.  CBC:   Recent Labs   Lab 10/25/24  0904 10/25/24  1710   WBC 5.41 5.26   HGB 9.0* 8.5*   HCT 28.4* 26.8*    376     CMP:   Recent Labs   Lab 10/25/24  0904 10/25/24  1710    133*   K 4.8 3.8   CL 96 93*   CO2 25 30*    115*   BUN 30* 12   CREATININE 14.1* 7.0*   CALCIUM 10.0 8.4*   PROT 8.6* 7.7   ALBUMIN 3.3* 3.8    BILITOT 0.5 0.5   ALKPHOS 102 87   AST 55* 46*   ALT 72* 57*   ANIONGAP 18* 10       Significant Imaging: I have reviewed all pertinent imaging results/findings within the past 24 hours.

## 2024-10-26 NOTE — PLAN OF CARE
Problem: Adult Inpatient Plan of Care  Goal: Plan of Care Review  Outcome: Progressing     Problem: Wound  Goal: Improved Oral Intake  Outcome: Progressing

## 2024-10-26 NOTE — PROGRESS NOTES
Pharmacist Renal Dose Adjustment Note    João Ham is a 44 y.o. male being treated with the medication  enoxaparin     Patient Data:    Vital Signs (Most Recent):  Temp: 98.5 °F (36.9 °C) (10/25/24 1628)  Pulse: 82 (10/25/24 1830)  Resp: 17 (10/25/24 1830)  BP: (!) 93/53 (10/25/24 1830)  SpO2: 95 % (10/25/24 1830) Vital Signs (72h Range):  Temp:  [98.2 °F (36.8 °C)-98.5 °F (36.9 °C)]   Pulse:  []   Resp:  [17-18]   BP: ()/()   SpO2:  [95 %-99 %]      Recent Labs   Lab 10/21/24  1550 10/25/24  0904 10/25/24  1710   CREATININE 14.3* 14.1* 7.0*     Serum creatinine: 7 mg/dL (H) 10/25/24 1710  Estimated creatinine clearance: 18.4 mL/min (A)    Medication: enoxaparin  dose: 1 mg/kg frequency q12h will be changed to medication: enoxaparin  dose:1 mg/kg frequency:q24h    Pharmacist's Name: Major Donahue  Pharmacist's Extension: [phone]

## 2024-10-26 NOTE — PROGRESS NOTES
"Pharmacist Renal Dose Adjustment Note    João Ham is a 44 y.o. male being treated with the medication Meropenem    Patient Data:    Vital Signs (Most Recent):  Temp: 98.5 °F (36.9 °C) (10/25/24 1628)  Pulse: 84 (10/25/24 2100)  Resp: 14 (10/25/24 2100)  BP: (!) 105/57 (10/25/24 2100)  SpO2: 99 % (10/25/24 2100) Vital Signs (72h Range):  Temp:  [98.2 °F (36.8 °C)-98.5 °F (36.9 °C)]   Pulse:  []   Resp:  [12-18]   BP: ()/()   SpO2:  [91 %-99 %]        Ht: 6' 2" (1.88 m)  Wt: 116.1 kg (256 lb)  Estimated Creatinine Clearance: 18.4 mL/min (A) (based on SCr of 7 mg/dL (H)).  Body mass index is 32.87 kg/m².    Per [facility] renal dosing protocol:     Previous Order: Meropenem 1 g Q8H    Will be changed to:     New Order: Meropenem 1 g Q12H,    Due to: CrCl < 25 mL/min    Renal dose adjustments performed as noted above.    We will continue monitoring and adjusting as necessary.    Pharmacist: Kali Theodore PharmD  Ext: [phone]      "

## 2024-10-26 NOTE — SUBJECTIVE & OBJECTIVE
Interval History: no acute events since admission.  BP remains stable.  Awaiting further recommendations from nephrology.    Review of Systems   Constitutional:  Negative for activity change and appetite change.   HENT:  Negative for congestion and dental problem.    Eyes:  Negative for discharge and itching.   Respiratory:  Negative for shortness of breath.    Cardiovascular:  Negative for chest pain.   Gastrointestinal:  Positive for abdominal pain. Negative for abdominal distention.   Endocrine: Negative for cold intolerance.   Genitourinary:  Negative for difficulty urinating and dysuria.   Musculoskeletal:  Negative for arthralgias and back pain.   Skin:  Positive for wound. Negative for color change.   Neurological:  Negative for dizziness and facial asymmetry.   Hematological:  Negative for adenopathy.   Psychiatric/Behavioral:  Negative for agitation and behavioral problems.      Objective:     Vital Signs (Most Recent):  Temp: 98.5 °F (36.9 °C) (10/26/24 0749)  Pulse: 88 (10/26/24 0749)  Resp: 18 (10/26/24 0749)  BP: 120/78 (10/26/24 0749)  SpO2: 98 % (10/26/24 0749) Vital Signs (24h Range):  Temp:  [98.5 °F (36.9 °C)-99.1 °F (37.3 °C)] 98.5 °F (36.9 °C)  Pulse:  [] 88  Resp:  [12-23] 18  SpO2:  [91 %-100 %] 98 %  BP: ()/(50-92) 120/78     Weight: 119.6 kg (263 lb 10.7 oz)  Body mass index is 33.85 kg/m².    Intake/Output Summary (Last 24 hours) at 10/26/2024 0859  Last data filed at 10/26/2024 0645  Gross per 24 hour   Intake 2560 ml   Output --   Net 2560 ml         Physical Exam  Vitals and nursing note reviewed.   Constitutional:       Appearance: He is well-developed.   HENT:      Head: Atraumatic.      Right Ear: External ear normal.      Left Ear: External ear normal.      Nose: Nose normal.      Mouth/Throat:      Mouth: Mucous membranes are moist.   Eyes:      Extraocular Movements: Extraocular movements intact.   Cardiovascular:      Rate and Rhythm: Normal rate.   Pulmonary:       Effort: Pulmonary effort is normal.      Breath sounds: Normal breath sounds.   Abdominal:      Comments: Oozing bandage  Colostomy insitu   Musculoskeletal:         General: Normal range of motion.      Cervical back: Full passive range of motion without pain and normal range of motion.   Skin:     General: Skin is warm.   Neurological:      General: No focal deficit present.      Mental Status: He is alert and oriented to person, place, and time.   Psychiatric:         Mood and Affect: Mood is depressed. Affect is flat.         Behavior: Behavior normal.             Significant Labs: All pertinent labs within the past 24 hours have been reviewed.  CBC:   Recent Labs   Lab 10/25/24  0904 10/25/24  1710   WBC 5.41 5.26   HGB 9.0* 8.5*   HCT 28.4* 26.8*    376     CMP:   Recent Labs   Lab 10/25/24  0904 10/25/24  1710    133*   K 4.8 3.8   CL 96 93*   CO2 25 30*    115*   BUN 30* 12   CREATININE 14.1* 7.0*   CALCIUM 10.0 8.4*   PROT 8.6* 7.7   ALBUMIN 3.3* 3.8   BILITOT 0.5 0.5   ALKPHOS 102 87   AST 55* 46*   ALT 72* 57*   ANIONGAP 18* 10     Lactic Acid:   Recent Labs   Lab 10/25/24  0904 10/25/24  2131   LACTATE 2.1 1.4       Significant Imaging: I have reviewed all pertinent imaging results/findings within the past 24 hours.

## 2024-10-26 NOTE — ASSESSMENT & PLAN NOTE
As mentioned in HPI  Started on iv vanc/iv meropenem/iv diflucan  CT abdomen/pelvis -  Postsurgical changes of exploratory laparotomy at the midline with mild associated fluid and edema. Interval dehiscence or intervention at the laparotomy site. Correlate for recent intervention. Postsurgical changes of colon with colostomy in the mid abdomen, left of midline. Mild infectious/inflammatory edema surrounding colostomy, similar to prior. Previously noted ill-defined intraperitoneal/mesenteric fluid collection adjacent has decreased in size, now 28 x 22 mm (previously 51 x 48 mm). Slight decrease adjacent infectious/inflammatory edema throughout the mesentery.   Postsurgical changes in the left inguinal canal with associated infectious/inflammatory fluid and edema and questionable small abscess.   General surgery consult

## 2024-10-26 NOTE — PROGRESS NOTES
"Pharmacokinetic Initial Assessment: IV Vancomycin    Assessment/Plan:    Initiate intravenous vancomycin with loading dose of 2000 mg once with subsequent doses when random concentrations are less than 25 mcg/mL  Desired empiric serum trough concentration is 15 to 20 mcg/mL  Draw vancomycin random level on 10/27 at 0430.  Pharmacy will continue to follow and monitor vancomycin.      Please contact pharmacy at extension [phone] with any questions regarding this assessment.     Thank you for the consult,   Kali Theodore       Patient brief summary:  João Ham is a 44 y.o. male initiated on antimicrobial therapy with IV Vancomycin for treatment of suspected bacteremia    Drug Allergies:   Review of patient's allergies indicates:   Allergen Reactions    Mushroom Swelling     Tongue swells    Tongue swells       Tongue swells       Actual Body Weight:   116.1 kg    Renal Function:   Estimated Creatinine Clearance: 18.4 mL/min (A) (based on SCr of 7 mg/dL (H)).,     CBC (last 72 hours):  Recent Labs   Lab Result Units 10/25/24  0904 10/25/24  1710   WBC K/uL 5.41 5.26   Hemoglobin g/dL 9.0* 8.5*   Hematocrit % 28.4* 26.8*   Platelets K/uL 398 376   Gran % % 52.4 60.6   Lymph % % 25.1 20.0   Mono % % 15.5* 15.4*   Eosinophil % % 6.1 3.2   Basophil % % 0.7 0.6   Differential Method  Automated Automated       Metabolic Panel (last 72 hours):  Recent Labs   Lab Result Units 10/25/24  0904 10/25/24  1710   Sodium mmol/L 139 133*   Potassium mmol/L 4.8 3.8   Chloride mmol/L 96 93*   CO2 mmol/L 25 30*   Glucose mg/dL 101 115*   BUN mg/dL 30* 12   Creatinine mg/dL 14.1* 7.0*   Albumin g/dL 3.3* 3.8   Total Bilirubin mg/dL 0.5 0.5   Alkaline Phosphatase U/L 102 87   AST U/L 55* 46*   ALT U/L 72* 57*       Drug levels (last 3 results):  No results for input(s): "VANCOMYCINRA", "VANCORANDOM", "VANCOMYCINPE", "VANCOPEAK", "VANCOMYCINTR", "VANCOTROUGH" in the last 72 hours.    Microbiologic Results:  Microbiology Results (last 7 days)  "      Procedure Component Value Units Date/Time    Aerobic culture [[id number]] Collected: 10/25/24 1932    Order Status: Sent Specimen: Incision site from Abdomen Updated: 10/25/24 2009    Blood culture x two cultures. Draw prior to antibiotics. [[id number]] Collected: 10/25/24 1748    Order Status: Sent Specimen: Blood from Peripheral, Antecubital, Right Updated: 10/25/24 1802    Narrative:      Collection has been rescheduled by BNI at 10/25/2024 16:42 Reason:   Nurse tomasz request to come back  Collection has been rescheduled by [email or web address] at 10/25/2024 17:19 Reason:   Patient has fistula must wait 15 mins  Collection has been rescheduled by BNI at 10/25/2024 16:42 Reason:   Nurse tomasz request to come back  Collection has been rescheduled by [email or web address] at 10/25/2024 17:19 Reason:   Patient has fistula must wait 15 mins    Blood culture x two cultures. Draw prior to antibiotics. [[id number]] Collected: 10/25/24 1719    Order Status: Sent Specimen: Blood from Peripheral, Antecubital, Right Updated: 10/25/24 1744    Narrative:      Collection has been rescheduled by BNI at 10/25/2024 16:42 Reason:   Nurse tomasz request to come back  Collection has been rescheduled by BNI at 10/25/2024 16:42 Reason:   Nurse tomasz request to come back

## 2024-10-26 NOTE — ASSESSMENT & PLAN NOTE
"This patient does have evidence of infective focus  My overall impression is sepsis.  Source: Abdominal  Antibiotics given-   Antibiotics (72h ago, onward)      Start     Stop Route Frequency Ordered    10/26/24 0830  meropenem injection 1 g         -- IV Every 12 hours (non-standard times) 10/25/24 2117    10/25/24 1741  vancomycin - pharmacy to dose  (vancomycin IVPB (PEDS and ADULTS))        Placed in "And" Linked Group    -- IV pharmacy to manage frequency 10/25/24 1642          Latest lactate reviewed-  Recent Labs   Lab 10/25/24  1732 10/25/24  2131   LACTATE  --  1.4   POCLAC 3.21*  --      Organ dysfunction indicated by  hypotension, tachycardia, tachypnea    Fluid challenge Actual Body weight- Patient will receive 30ml/kg actual body weight to calculate fluid bolus for treatment of septic shock.     Post- resuscitation assessment Yes Perfusion exam was performed within 6 hours of septic shock presentation after bolus shows Adequate tissue perfusion assessed by non-invasive monitoring       Will Not start Pressors- Levophed for MAP of 65  Source control achieved by: IV antibiotics; general surgery consulted.  "

## 2024-10-26 NOTE — PROGRESS NOTES
[facility]  Progress Note    Patient Name: João Ham  MRN: [id number]  Patient Class: OP- Observation   Admission Date: 10/25/2024  Length of Stay: 0 days  Attending Physician: Tera Pierre MD  Primary Care Provider: Dawson Granado MD        Subjective:     Principal Problem:Postoperative wound breakdown        HPI:  44 year old pt getting admitted with suspected intra abdominal infection and draining wound in abdomen  This pt is HD dependent for ESRD and was in this hospital last month  Last month he got admitted with colonic perforation and abscess   He underwent laparotomy/colectomy and eventually colostomy  He went home 96 hrs ago  HH nurse noticed increased drainage from wound area and asked him to come to ER  He is on Eliquis which pt doesn't know why he is taking that  Denied fever but c/o on and off abdominal pain   In the ER today he was hypotensive and tachycardic     Overview/Hospital Course:  João Ham was monitored closely during his hospitalization.  CT abdomen and pelvis performed showed postsurgical changes in the left inguinal canal with associated infectious/inflammatory fluid and edema and questionable small abscess, consistent with prior CT. He was hypotensive on presentation which improved with IVF.  He was placed on IV abx.  General surgery was consulted.      Interval History: no acute events since admission.  BP remains stable.  Awaiting further recommendations from nephrology.    Review of Systems   Constitutional:  Negative for activity change and appetite change.   HENT:  Negative for congestion and dental problem.    Eyes:  Negative for discharge and itching.   Respiratory:  Negative for shortness of breath.    Cardiovascular:  Negative for chest pain.   Gastrointestinal:  Positive for abdominal pain. Negative for abdominal distention.   Endocrine: Negative for cold intolerance.   Genitourinary:  Negative for difficulty urinating and  dysuria.   Musculoskeletal:  Negative for arthralgias and back pain.   Skin:  Positive for wound. Negative for color change.   Neurological:  Negative for dizziness and facial asymmetry.   Hematological:  Negative for adenopathy.   Psychiatric/Behavioral:  Negative for agitation and behavioral problems.      Objective:     Vital Signs (Most Recent):  Temp: 98.5 °F (36.9 °C) (10/26/24 0749)  Pulse: 88 (10/26/24 0749)  Resp: 18 (10/26/24 0749)  BP: 120/78 (10/26/24 0749)  SpO2: 98 % (10/26/24 0749) Vital Signs (24h Range):  Temp:  [98.5 °F (36.9 °C)-99.1 °F (37.3 °C)] 98.5 °F (36.9 °C)  Pulse:  [] 88  Resp:  [12-23] 18  SpO2:  [91 %-100 %] 98 %  BP: ()/(50-92) 120/78     Weight: 119.6 kg (263 lb 10.7 oz)  Body mass index is 33.85 kg/m².    Intake/Output Summary (Last 24 hours) at 10/26/2024 0859  Last data filed at 10/26/2024 0645  Gross per 24 hour   Intake 2560 ml   Output --   Net 2560 ml         Physical Exam  Vitals and nursing note reviewed.   Constitutional:       Appearance: He is well-developed.   HENT:      Head: Atraumatic.      Right Ear: External ear normal.      Left Ear: External ear normal.      Nose: Nose normal.      Mouth/Throat:      Mouth: Mucous membranes are moist.   Eyes:      Extraocular Movements: Extraocular movements intact.   Cardiovascular:      Rate and Rhythm: Normal rate.   Pulmonary:      Effort: Pulmonary effort is normal.      Breath sounds: Normal breath sounds.   Abdominal:      Comments: Oozing bandage  Colostomy insitu   Musculoskeletal:         General: Normal range of motion.      Cervical back: Full passive range of motion without pain and normal range of motion.   Skin:     General: Skin is warm.   Neurological:      General: No focal deficit present.      Mental Status: He is alert and oriented to person, place, and time.   Psychiatric:         Mood and Affect: Mood is depressed. Affect is flat.         Behavior: Behavior normal.             Significant Labs:  All pertinent labs within the past 24 hours have been reviewed.  CBC:   Recent Labs   Lab 10/25/24  0904 10/25/24  1710   WBC 5.41 5.26   HGB 9.0* 8.5*   HCT 28.4* 26.8*    376     CMP:   Recent Labs   Lab 10/25/24  0904 10/25/24  1710    133*   K 4.8 3.8   CL 96 93*   CO2 25 30*    115*   BUN 30* 12   CREATININE 14.1* 7.0*   CALCIUM 10.0 8.4*   PROT 8.6* 7.7   ALBUMIN 3.3* 3.8   BILITOT 0.5 0.5   ALKPHOS 102 87   AST 55* 46*   ALT 72* 57*   ANIONGAP 18* 10     Lactic Acid:   Recent Labs   Lab 10/25/24  0904 10/25/24  2131   LACTATE 2.1 1.4       Significant Imaging: I have reviewed all pertinent imaging results/findings within the past 24 hours.    Assessment/Plan:      * Postoperative wound breakdown  As mentioned in HPI  Started on iv vanc/iv meropenem/iv diflucan  CT abdomen/pelvis -  Postsurgical changes of exploratory laparotomy at the midline with mild associated fluid and edema. Interval dehiscence or intervention at the laparotomy site. Correlate for recent intervention. Postsurgical changes of colon with colostomy in the mid abdomen, left of midline. Mild infectious/inflammatory edema surrounding colostomy, similar to prior. Previously noted ill-defined intraperitoneal/mesenteric fluid collection adjacent has decreased in size, now 28 x 22 mm (previously 51 x 48 mm). Slight decrease adjacent infectious/inflammatory edema throughout the mesentery.   Postsurgical changes in the left inguinal canal with associated infectious/inflammatory fluid and edema and questionable small abscess.   General surgery consult      History of ESBL E. coli infection    Aerobic Bacterial Culture ESCHERICHIA COLI  From broth only   Abnormal     Susceptibility     Escherichia coli     CULTURE, AEROBIC  (SPECIFY SOURCE)     Amp/Sulbactam 16/8 mcg/mL Intermediate     Ampicillin >16 mcg/mL Resistant     Cefazolin 8 mcg/mL Resistant     Cefepime <=2 mcg/mL Sensitive     Ceftriaxone <=1 mcg/mL Sensitive      "Ciprofloxacin <=0.25 mcg/mL Sensitive     Ertapenem <=0.5 mcg/mL Sensitive     Gentamicin <=2 mcg/mL Sensitive     Levofloxacin <=0.5 mcg/mL Sensitive     Meropenem <=1 mcg/mL Sensitive     Piperacillin/Tazo <=8 mcg/mL Sensitive     Tetracycline <=4 mcg/mL Sensitive     Tobramycin <=2 mcg/mL Sensitive     Trimeth/Sulfa <=2/38 mcg/mL Sensitive              Anticoagulated  Change NOAC to SQ lovenox      Hypotension  Improved with IVFH  Continue to monitor        Sepsis  This patient does have evidence of infective focus  My overall impression is sepsis.  Source: Abdominal  Antibiotics given-   Antibiotics (72h ago, onward)      Start     Stop Route Frequency Ordered    10/26/24 0830  meropenem injection 1 g         -- IV Every 12 hours (non-standard times) 10/25/24 2117    10/25/24 1741  vancomycin - pharmacy to dose  (vancomycin IVPB (PEDS and ADULTS))        Placed in "And" Linked Group    -- IV pharmacy to manage frequency 10/25/24 1642          Latest lactate reviewed-  Recent Labs   Lab 10/25/24  1732 10/25/24  2131   LACTATE  --  1.4   POCLAC 3.21*  --      Organ dysfunction indicated by  hypotension, tachycardia, tachypnea    Fluid challenge Actual Body weight- Patient will receive 30ml/kg actual body weight to calculate fluid bolus for treatment of septic shock.     Post- resuscitation assessment Yes Perfusion exam was performed within 6 hours of septic shock presentation after bolus shows Adequate tissue perfusion assessed by non-invasive monitoring       Will Not start Pressors- Levophed for MAP of 65  Source control achieved by: IV antibiotics; general surgery consulted.    ESRD (end stage renal disease)  Creatine stable for now. BMP reviewed- noted Estimated Creatinine Clearance: 18.7 mL/min (A) (based on SCr of 7 mg/dL (H)). according to latest data. Based on current GFR, CKD stage is end stage.  Monitor UOP and serial BMP and adjust therapy as needed. Renally dose meds. Avoid nephrotoxic medications and " procedures.  Nephrology consult      VTE Risk Mitigation (From admission, onward)           Ordered     enoxaparin injection 120 mg  Daily         10/25/24 1903     IP VTE HIGH RISK PATIENT  Once         10/25/24 1757     Place sequential compression device  Until discontinued         10/25/24 1757                    Discharge Planning   MARIA ISABEL:      Code Status: Full Code   Is the patient medically ready for discharge?:     Reason for patient still in hospital (select all that apply): Patient trending condition, Treatment, and Consult recommendations  Discharge Plan A: Home                  KEISHA Montano  Department of Hospital Medicine   [facility]

## 2024-10-26 NOTE — HPI
44 year old pt getting admitted with suspected intra abdominal infection and draining wound in abdomen  This pt is HD dependent for ESRD and was in this hospital last month  Last month he got admitted with colonic perforation and abscess   He underwent laparotomy/colectomy and eventually colostomy  He went home 96 hrs ago  HH nurse noticed increased drainage from wound area and asked him to come to ER  He is on Eliquis which pt doesn't know why he is taking that  Denied fever but c/o on and off abdominal pain   In the ER today he was hypotensive and tachycardic

## 2024-10-26 NOTE — ASSESSMENT & PLAN NOTE
Aerobic Bacterial Culture ESCHERICHIA COLI  From broth only   Abnormal     Susceptibility     Escherichia coli     CULTURE, AEROBIC  (SPECIFY SOURCE)     Amp/Sulbactam 16/8 mcg/mL Intermediate     Ampicillin >16 mcg/mL Resistant     Cefazolin 8 mcg/mL Resistant     Cefepime <=2 mcg/mL Sensitive     Ceftriaxone <=1 mcg/mL Sensitive     Ciprofloxacin <=0.25 mcg/mL Sensitive     Ertapenem <=0.5 mcg/mL Sensitive     Gentamicin <=2 mcg/mL Sensitive     Levofloxacin <=0.5 mcg/mL Sensitive     Meropenem <=1 mcg/mL Sensitive     Piperacillin/Tazo <=8 mcg/mL Sensitive     Tetracycline <=4 mcg/mL Sensitive     Tobramycin <=2 mcg/mL Sensitive     Trimeth/Sulfa <=2/38 mcg/mL Sensitive

## 2024-10-26 NOTE — ASSESSMENT & PLAN NOTE
Creatine stable for now. BMP reviewed- noted Estimated Creatinine Clearance: 18.7 mL/min (A) (based on SCr of 7 mg/dL (H)). according to latest data. Based on current GFR, CKD stage is end stage.  Monitor UOP and serial BMP and adjust therapy as needed. Renally dose meds. Avoid nephrotoxic medications and procedures.  Nephrology consult

## 2024-10-26 NOTE — ASSESSMENT & PLAN NOTE
Creatine stable for now. BMP reviewed- noted Estimated Creatinine Clearance: 18.4 mL/min (A) (based on SCr of 7 mg/dL (H)). according to latest data. Based on current GFR, CKD stage is end stage.  Monitor UOP and serial BMP and adjust therapy as needed. Renally dose meds. Avoid nephrotoxic medications and procedures.

## 2024-10-26 NOTE — PLAN OF CARE
[facility]  Initial Discharge Assessment       Primary Care Provider: Dawson Granado MD    Admission Diagnosis: Encounter for post surgical wound check [Z48.89]    Admission Date: 10/25/2024  Expected Discharge Date: Assessment completed at bedside with pt , and all information on FaceSheet confirmed, including demographics, PCP, pharmacy and insurance.   did not address advance directives. He currently lives alone in a one story home .  His PCP is Dr. Granado.  He denies any HH/HD/Blood Thinners.  For transportation to appointments, he usually provides his own transportation.  For transportation at discharge, pt does not know who will  provide.  Plan for discharge is home with no needs at this time.  Case Management to continue to follow for discharge planning needs.     Transition of Care Barriers: (P) None    Payor: BLUE CROSS BLUE SHIELD / Plan: BCBS ALL OUT OF STATE / Product Type: PPO /     Extended Emergency Contact Information  Primary Emergency Contact: Khushboo Guidry  Mobile Phone: [phone]  Relation: Grandparent  Preferred language: English   needed? No  Secondary Emergency Contact: janae escamilla  Mobile Phone: [phone]  Relation: Daughter    Discharge Plan A: (P) Home  Discharge Plan B: (P) Home      [facility] [address] - [place] - [address]  [address] [address]  Phone: [phone] Fax: [phone]      Initial Assessment (most recent)       Adult Discharge Assessment - 10/26/24 0907          Discharge Assessment    Assessment Type Discharge Planning Assessment (P)      Confirmed/corrected address, phone number and insurance Yes (P)      Confirmed Demographics Correct on Facesheet (P)      Source of Information patient (P)      When was your last doctors appointment? -- (P)    Per pt last appointment was yesterday    People in Home alone (P)      Do you expect to return to your current living situation? Yes (P)       Do you have help at home or someone to help you manage your care at home? Yes (P)      Prior to hospitilization cognitive status: Alert/Oriented (P)      Current cognitive status: Alert/Oriented (P)      Walking or Climbing Stairs Difficulty no (P)      Dressing/Bathing Difficulty no (P)      Do you have any problems with: -- (P)    pt does not need assitance with ADLs    Home Layout Able to live on 1st floor (P)      Equipment Currently Used at Home none (P)      Readmission within 30 days? No (P)      Patient currently being followed by outpatient case management? No (P)      Do you currently have service(s) that help you manage your care at home? No (P)      Do you take prescription medications? Yes (P)      Do you have prescription coverage? Yes (P)      Coverage Blue Cross Blue Shield (P)      Do you have any problems affording any of your prescribed medications? No (P)      Is the patient taking medications as prescribed? yes (P)      Who is going to help you get home at discharge? per pt he does not know how he will get home (P)      How do you get to doctors appointments? car, drives self (P)      Are you on dialysis? Yes (P)      Dialysis Name and Scheduled days M,W,F d[facility]a [facility] (P)      Do you take coumadin? No (P)      Discharge Plan A Home (P)      Discharge Plan B Home (P)      DME Needed Upon Discharge  none (P)      Discharge Plan discussed with: Patient (P)      Transition of Care Barriers None (P)         Physical Activity    On average, how many days per week do you engage in moderate to strenuous exercise (like a brisk walk)? 0 days (P)      On average, how many minutes do you engage in exercise at this level? 0 min (P)         Financial Resource Strain    How hard is it for you to pay for the very basics like food, housing, medical care, and heating? Somewhat hard (P)         Housing Stability    In the last 12 months, was there a time when you were not able to pay the mortgage or rent on  time? No (P)      At any time in the past 12 months, were you homeless or living in a shelter (including now)? No (P)         Transportation Needs    Has the lack of transportation kept you from medical appointments, meetings, work or from getting things needed for daily living? No (P)         Food Insecurity    Within the past 12 months, you worried that your food would run out before you got the money to buy more. Never true (P)      Within the past 12 months, the food you bought just didn't last and you didn't have money to get more. Never true (P)         Stress    Do you feel stress - tense, restless, nervous, or anxious, or unable to sleep at night because your mind is troubled all the time - these days? Not at all (P)         Social Isolation    How often do you feel lonely or isolated from those around you?  Never (P)         Alcohol Use    Q1: How often do you have a drink containing alcohol? Never (P)      Q2: How many drinks containing alcohol do you have on a typical day when you are drinking? Patient does not drink (P)      Q3: How often do you have six or more drinks on one occasion? Never (P)         Utilities    In the past 12 months has the electric, gas, oil, or water company threatened to shut off services in your home? No (P)         Health Literacy    How often do you need to have someone help you when you read instructions, pamphlets, or other written material from your doctor or pharmacy? Never (P)         OTHER    Name(s) of People in Home Per pt he lives alone (P)

## 2024-10-26 NOTE — CONSULTS
Nephrology Consult Note        Patient Name: João Ham  MRN: [id number]    Patient Class: OP- Observation   Admission Date: 10/25/2024  Length of Stay: 0 days  Date of Service: 10/26/2024    Attending Physician: Tera Pierre MD  Primary Care Provider: Dawson Granaod MD    Reason for Consult: esrd    SUBJECTIVE:     HPI: 44M with SERD on HD and recent admission for partial colectomy complicated by failure of anastomosis with re-operation and colostomy + sepsis presented to the emergency department sent by primary care provider for admission. Home health nurse noticed a change in the surgical wound and patient noticed more drainage and after blood work done at primary care provider's office patient was sent here for evaluation. Patient's blood work is close to baseline. Patient denies any fever or chills or nausea vomiting, feels well. Patient however is very hypotensive and tachycardic and has slight pain at the incision site.     Review of Systems:  Constitutional:  Negative for chills, fever, malaise/fatigue and weight loss.   HENT:  Negative for hearing loss and nosebleeds.    Eyes:  Negative for blurred vision, double vision and photophobia.   Respiratory:  Negative for cough, shortness of breath and wheezing.    Cardiovascular:  Negative for chest pain, palpitations and leg swelling.   Gastrointestinal:  Negative for abdominal pain, constipation, diarrhea, heartburn, nausea and vomiting.   Genitourinary:  Negative for dysuria, frequency and urgency.   Musculoskeletal:  Negative for falls, joint pain and myalgias.   Skin:  Negative for itching and rash.   Neurological:  Negative for dizziness, speech change, focal weakness, loss of consciousness and headaches.   Endo/Heme/Allergies:  Does not bruise/bleed easily.   Psychiatric/Behavioral:  Negative for depression and substance abuse. The patient is not nervous/anxious.      ASSESSMENT/PLAN:     ESRD on HD MWF via AVF  Hyponatremia  Next dialysis per  "schedule or PRN.  Continue current dialysis prescription and adjust as needed.  Renal diet - low K, low phos as tolerated.  No IVs or BP checks on access and/or non-dominant arm.    Anemia of CKD  Hgb and HCT are acceptable. Monitor for now.  Will provide LOR and/or IV iron as needed to keep Hgb 8-10 range.    MBD / Secondary HPT  Ca, Phos, PTH and vitamin D levels are acceptable.   Phos binders, vitamin D and analogues, calcimimetics will be given as needed.    Hypotension  Sepsis  Tolerate asymptomatic HTN up to -160.  Continue home meds. DO NOT GIVE IVF.  Treat any infection.    Thank you for allowing us to participate in the care of your patient!   We will follow the patient and provide recommendations as needed.         Laboratory:  Recent Labs   Lab 10/21/24  1550 10/25/24  0904 10/25/24  1710   * 139 133*   K 5.0 4.8 3.8   CL 99 96 93*   CO2 22* 25 30*   BUN 39* 30* 12   CREATININE 14.3* 14.1* 7.0*   GLU 98 101 115*       Recent Labs   Lab 10/19/24  1000 10/20/24  0502 10/21/24  1550 10/25/24  0904 10/25/24  1710   CALCIUM 9.7 10.0 9.7 10.0 8.4*   ALBUMIN 3.7 3.7 3.6 3.3* 3.8   MG 2.2 2.2 2.1  --   --        Recent Labs   Lab 02/02/23  0745 05/19/23  1022 06/19/23  1031   PTH, Intact 225.6 H 335.8 H 319.0 H       No results for input(s): "POCTGLUCOSE" in the last 168 hours.    Recent Labs   Lab 10/10/22  2005 06/22/23  0446 07/16/24  0510   Hemoglobin A1C 5.5 5.5 6.1       Recent Labs   Lab 10/21/24  1550 10/25/24  0904 10/25/24  1710   WBC 6.24 5.41 5.26   HGB 8.1* 9.0* 8.5*   HCT 25.9* 28.4* 26.8*    398 376   * 99* 99*   MCHC 31.3* 31.7* 31.7*   MONO 13.3  0.8 15.5*  0.8 15.4*  0.8   EOSINOPHIL 7.2 6.1 3.2       Recent Labs   Lab 10/21/24  1550 10/25/24  0904 10/25/24  1710   BILITOT 0.4 0.5 0.5   PROT 7.7 8.6* 7.7   ALBUMIN 3.6 3.3* 3.8   ALKPHOS 73 102 87   ALT 29 72* 57*   AST 28 55* 46*       Recent Labs   Lab 10/10/22  1645 01/09/23  1123 06/19/23  1622 07/17/24  0505 " 09/19/24  1435   Color, UA Yellow   < > Straw Yellow Yellow   Appearance, UA Clear   < > Clear Hazy A Clear   pH, UA 6.0   < > 6.0 6.0 8.0   Specific Gravity, UA 1.025   < > 1.010 1.020 1.010   Protein, UA 2+ A   < > 2+ A 3+ A 2+ A   Glucose, UA Negative   < > Negative Negative Negative   Ketones, UA Negative   < > Negative Negative Negative   Urobilinogen, UA Negative  --   --  Negative Negative   Bilirubin (UA) Negative   < > Negative Negative Negative   Occult Blood UA 1+ A   < > Negative 2+ A 2+ A   Nitrite, UA Negative   < > Negative Negative Negative   RBC, UA 1   < > 0 6 H 4   WBC, UA 0   < > 2 52 H 21 H   Bacteria None   < > None None None   Hyaline Casts, UA 0   < > 0 0 0    < > = values in this interval not displayed.       Recent Labs   Lab 09/25/24  1051 10/17/24  1923 10/25/24  1732   POC PH 7.404  --   --    POC PCO2 37.7  --   --    POC HCO3 23.6 L  --   --    POC PO2 87  --   --    POC SATURATED O2 97  --   --    POC BE -1  --   --    Sample ARTERIAL VENOUS VENOUS       Microbiology Results (last 7 days)       Procedure Component Value Units Date/Time    Blood culture x two cultures. Draw prior to antibiotics. [[id number]] Collected: 10/25/24 1748    Order Status: Completed Specimen: Blood from Peripheral, Antecubital, Right Updated: 10/26/24 0117     Blood Culture, Routine No Growth to date    Narrative:      Aerobic and anaerobic  Collection has been rescheduled by BNI at 10/25/2024 16:42 Reason:   Nurse tomasz request to come back  Collection has been rescheduled by RICHARD at 10/25/2024 17:19 Reason:   Patient has fistula must wait 15 mins  Collection has been rescheduled by BNI at 10/25/2024 16:42 Reason:   Nurse tomasz request to come back  Collection has been rescheduled by RICHARD at 10/25/2024 17:19 Reason:   Patient has fistula must wait 15 mins    Blood culture x two cultures. Draw prior to antibiotics. [[id number]] Collected: 10/25/24 1719    Order Status: Completed Specimen: Blood from  Peripheral, Antecubital, Right Updated: 10/25/24 6018     Blood Culture, Routine No Growth to date    Narrative:      Aerobic and anaerobic  Collection has been rescheduled by BNI at 10/25/2024 16:42 Reason:   Nurse tomasz request to come back  Collection has been rescheduled by BNI at 10/25/2024 16:42 Reason:   Nurse tomasz request to come back    Aerobic culture [[id number]] Collected: 10/25/24 1932    Order Status: Sent Specimen: Incision site from Abdomen Updated: 10/25/24 2009            Review of patient's allergies indicates:   Allergen Reactions    Mushroom Swelling     Tongue swells    Tongue swells       Tongue swells       Outpatient meds:  No current facility-administered medications on file prior to encounter.     Current Outpatient Medications on File Prior to Encounter   Medication Sig Dispense Refill    aluminum-magnesium hydroxide-simethicone (MAALOX) 200-200-20 mg/5 mL Susp Take 30 mLs by mouth 4 (four) times daily as needed.      calcitRIOL (ROCALTROL) 0.25 MCG Cap Take 1 capsule (0.25 mcg total) by mouth once daily.      calcium carbonate (TUMS) 200 mg calcium (500 mg) chewable tablet Take 2 tablets (1,000 mg total) by mouth 3 (three) times daily. 180 tablet 11    cholecalciferol, vitamin D3, 125 mcg (5,000 unit) Tab Take 5,000 Units by mouth every morning.      cloNIDine (CATAPRES) 0.3 MG tablet Take 1 tablet (0.3 mg total) by mouth 3 (three) times daily as needed (SBP > 150).      epoetin mily-epbx (RETACRIT) 20,000 unit/mL injection Inject 0.67 mLs (13,400 Units total) into the skin every Mon, Wed, Fri.      hydrALAZINE (APRESOLINE) 100 MG tablet Take 1 tablet (100 mg total) by mouth every 8 (eight) hours. Hold for SBP < 120 and before dialysis      metoprolol succinate (TOPROL-XL) 50 MG 24 hr tablet Take 150 mg by mouth once daily.      olmesartan (BENICAR) 40 MG tablet Take 1 tablet (40 mg total) by mouth once daily. 90 tablet 0    oxyCODONE (ROXICODONE) 20 mg Tab immediate release tablet  Take 1 tablet by mouth every 12 (twelve) hours.      sevelamer carbonate (RENVELA) 800 mg Tab Take 2 tablets (1,600 mg total) by mouth 3 (three) times daily with meals. 180 tablet 11    acetaminophen (TYLENOL) 325 MG tablet Take 2 tablets (650 mg total) by mouth every 4 (four) hours as needed. (Patient not taking: Reported on 10/25/2024)      ALPRAZolam (XANAX) 0.25 MG tablet Take 1 tablet (0.25 mg total) by mouth nightly as needed for Insomnia. (Patient not taking: Reported on 10/25/2024)      apixaban (ELIQUIS) 2.5 mg Tab Take 2.5 mg by mouth 2 (two) times daily.      gabapentin (NEURONTIN) 100 MG capsule Take 1 capsule (100 mg total) by mouth 3 (three) times daily. (Patient not taking: Reported on 10/25/2024)      heparin sodium,porcine (HEPARIN, PORCINE,) 1,000 unit/mL injection Inject 4 mLs (4,000 Units total) into the vein as needed (line care after dialysis).      heparin sodium,porcine (HEPARIN, PORCINE,) 5,000 unit/mL injection Inject 1 mL (5,000 Units total) into the skin every 8 (eight) hours.      HYDROmorphone (DILAUDID) 4 MG tablet Take 1 tablet (4 mg total) by mouth every 4 (four) hours as needed for Pain. (Patient not taking: Reported on 10/25/2024)      insulin aspart U-100 (NOVOLOG) 100 unit/mL (3 mL) InPn pen Inject 0-5 Units into the skin before meals and at bedtime as needed (Hyperglycemia). (Patient not taking: Reported on 10/25/2024)      isosorbide mononitrate (IMDUR) 120 MG 24 hr tablet Take 120 mg by mouth every morning.      isosorbide mononitrate (IMDUR) 30 MG 24 hr tablet Take 1 tablet (30 mg total) by mouth once daily.      ketoconazole (NIZORAL) 2 % shampoo Apply topically every other day. (Patient not taking: Reported on 10/25/2024)      LIDOcaine-prilocaine (EMLA) cream Apply topically as needed (pre dialysis).      melatonin (MELATIN) 3 mg tablet Take 2 tablets (6 mg total) by mouth nightly as needed for Insomnia. (Patient not taking: Reported on 10/25/2024)      miconazole  (MICOTIN) 2 % cream Apply topically 2 (two) times daily. (Patient not taking: Reported on 10/25/2024)      ondansetron (ZOFRAN-ODT) 4 MG TbDL Take 1 tablet (4 mg total) by mouth every 6 (six) hours as needed (nausea). (Patient not taking: Reported on 10/25/2024)      oxymetazoline (AFRIN) 0.05 % nasal spray 1 spray by Nasal route 2 (two) times daily. (Patient not taking: Reported on 10/25/2024)      simethicone (MYLICON) 80 MG chewable tablet Take 1 tablet (80 mg total) by mouth 3 (three) times daily as needed for Flatulence.         Scheduled meds:   enoxparin  1 mg/kg Subcutaneous Daily    famotidine  20 mg Oral Daily    fluconazole (DIFLUCAN) IV (PEDS and ADULTS)  100 mg Intravenous Q24H    meropenem IV (PEDS and ADULTS)  1 g Intravenous Q12H    polyethylene glycol  17 g Oral Daily       Infusions:      PRN meds:    Current Facility-Administered Medications:     acetaminophen, 650 mg, Oral, Q8H PRN    acetaminophen, 650 mg, Oral, Q4H PRN    aluminum-magnesium hydroxide-simethicone, 30 mL, Oral, QID PRN    HYDROcodone-acetaminophen, 1 tablet, Oral, Q6H PRN    melatonin, 6 mg, Oral, Nightly PRN    naloxone, 0.02 mg, Intravenous, PRN    ondansetron, 4 mg, Intravenous, Q6H PRN    Pharmacy to dose Vancomycin consult, , , Once **AND** vancomycin - pharmacy to dose, , Intravenous, pharmacy to manage frequency    Past Medical History:   Diagnosis Date    Allergy     Anemia     Anxiety     CHF (congestive heart failure)     Depression     High blood pressure with chronic kidney disease, stage 5 chronic kidney disease or end stage renal disease     Hypertension      Past Surgical History:   Procedure Laterality Date    ABSCESS DRAINAGE Left 9/17/2024    Procedure: DRAINAGE, ABSCESS, GROIN;  Surgeon: Galileo Connors MD;  Location: [facility] OR;  Service: General;  Laterality: Left;    COLON RESECTION  9/25/2024    Procedure: COLON RESECTION;  Surgeon: Galileo Connors MD;  Location: [facility] OR;  Service: General;;    FISTULOGRAM  Bilateral 4/30/2024    Procedure: Fistulogram;  Surgeon: Khoobehi, Ali, MD;  Location: [facility] CATH/EP LAB;  Service: Vascular;  Laterality: Bilateral;    FISTULOGRAM Left 9/24/2024    Procedure: Fistulogram;  Surgeon: Lorraine Salvador MD;  Location: [facility] CATH/EP LAB;  Service: General;  Laterality: Left;    HERNIA REPAIR Right     With mesh    INSERTION, CATHETER, TUNNELED N/A 6/22/2023    Procedure: Insertion,catheter,tunneled;  Surgeon: Everett Caicedo MD;  Location: [facility] OR;  Service: General;  Laterality: N/A;    LAPAROTOMY, EXPLORATORY N/A 9/25/2024    Procedure: LAPAROTOMY, EXPLORATORY;  Surgeon: Galileo Connors MD;  Location: [facility] OR;  Service: General;  Laterality: N/A;    MOBILIZATION OF SPLENIC FLEXURE  9/25/2024    Procedure: MOBILIZATION, SPLENIC FLEXURE;  Surgeon: Galileo Connors MD;  Location: [facility];  Service: General;;    PERCUTANEOUS TRANSLUMINAL ANGIOPLASTY OF ARTERIOVENOUS FISTULA Left 4/30/2024    Procedure: PTA, AV FISTULA;  Surgeon: Khoobehi, Ali, MD;  Location: [facility] CATH/EP LAB;  Service: Vascular;  Laterality: Left;    PERCUTANEOUS TRANSLUMINAL ANGIOPLASTY OF ARTERIOVENOUS FISTULA Left 9/24/2024    Procedure: PTA, AV FISTULA;  Surgeon: Lorraine Salvador MD;  Location: [facility] CATH/EP LAB;  Service: General;  Laterality: Left;    PHLEBOGRAPHY N/A 7/19/2024    Procedure: Venogram;  Surgeon: Khoobehi, Ali, MD;  Location: [facility] CATH/EP LAB;  Service: Vascular;  Laterality: N/A;    REMOVAL, TUNNELED CATH Right 7/19/2024    Procedure: REMOVAL, TUNNELED CATH;  Surgeon: Khoobehi, Ali, MD;  Location: [facility] CATH/EP LAB;  Service: Vascular;  Laterality: Right;    ROBOT-ASSISTED LAPAROSCOPIC RESECTION OF SIGMOID COLON USING DA DELANO XI N/A 9/17/2024    Procedure: XI ROBOTIC SIGMOID RESECTION, WITH ANASTAMOSIS;  Surgeon: Galileo Connors MD;  Location: [facility] OR;  Service: General;  Laterality: N/A;  possible open have instruments available     Family History   Problem Relation Name Age of Onset     Hypertension Mother       Social History     Tobacco Use    Smoking status: Never     Passive exposure: Never    Smokeless tobacco: Never   Substance Use Topics    Alcohol use: Never    Drug use: Never       OBJECTIVE:     Vital Signs and IO:  Temp:  [98.5 °F (36.9 °C)-99.1 °F (37.3 °C)]   Pulse:  []   Resp:  [12-23]   BP: ()/(50-92)   SpO2:  [91 %-100 %]   I/O last 3 completed shifts:  In: 2560 [I.V.:910; IV Piggyback:1650]  Out: -   Wt Readings from Last 5 Encounters:   10/26/24 119.6 kg (263 lb 10.7 oz)   10/25/24 116.7 kg (257 lb 4.4 oz)   10/19/24 120.5 kg (265 lb 10.5 oz)   09/18/24 134 kg (295 lb 6.7 oz)   08/29/24 131.3 kg (289 lb 7.4 oz)     Body mass index is 33.85 kg/m².    Physical Exam  Constitutional:       General: Patient is not in acute distress.     Appearance: Patient is well-developed. She is not diaphoretic.   HENT:      Head: Normocephalic and atraumatic.      Mouth/Throat: Mucous membranes are moist.   Eyes:      General: No scleral icterus.     Pupils: Pupils are equal, round, and reactive to light.   Cardiovascular:      Rate and Rhythm: Normal rate and regular rhythm.   Pulmonary:      Effort: Pulmonary effort is normal. No respiratory distress.      Breath sounds: No stridor.   Abdominal:      General: There is no distension.      Palpations: Abdomen is soft.   Musculoskeletal:         General: No deformity. Normal range of motion.      Cervical back: Neck supple.   Skin:     General: Skin is warm and dry.      Findings: No rash present. No erythema.   Neurological:      Mental Status: Patient is alert and oriented to person, place, and time.      Cranial Nerves: No cranial nerve deficit.   Psychiatric:         Behavior: Behavior normal.          Patient care time was spent personally by me on the following activities:     Obtaining a history.  Examination of patient.  Providing medical care at the patients bedside.  Developing a treatment plan with patient or surrogate and  bedside caregivers.  Ordering and reviewing laboratory studies, radiographic studies, pulse oximetry.  Ordering and performing treatments and interventions.  Evaluation of patient's response to treatment.  Discussions with consultants while on the unit and immediately available to the patient.  Re-evaluation of the patient's condition.  Documentation in the medical record.     Jeremy Madrid MD    [facility]  [address] [place] [id number]    [phone] - tel  [phone] - fax    10/26/2024

## 2024-10-26 NOTE — CARE UPDATE
10/26/24 0646   Patient Assessment/Suction   Level of Consciousness (AVPU) alert   Respiratory Effort Normal;Unlabored   Expansion/Accessory Muscles/Retractions no use of accessory muscles;no retractions;expansion symmetric   Rhythm/Pattern, Respiratory unlabored   Cough Frequency no cough   PRE-TX-O2   Device (Oxygen Therapy) room air   SpO2 96 %   Pulse Oximetry Type Intermittent   Pulse 89   Resp 18   Education   $ Education 15 min;Oxygen

## 2024-10-27 LAB
ALBUMIN SERPL BCP-MCNC: 3.6 G/DL (ref 3.5–5.2)
ALP SERPL-CCNC: 77 U/L (ref 55–135)
ALT SERPL W/O P-5'-P-CCNC: 36 U/L (ref 10–44)
ANION GAP SERPL CALC-SCNC: 13 MMOL/L (ref 8–16)
AST SERPL-CCNC: 25 U/L (ref 10–40)
BASOPHILS # BLD AUTO: 0.04 K/UL (ref 0–0.2)
BASOPHILS NFR BLD: 0.6 % (ref 0–1.9)
BILIRUB SERPL-MCNC: 0.4 MG/DL (ref 0.1–1)
BUN SERPL-MCNC: 24 MG/DL (ref 6–20)
CALCIUM SERPL-MCNC: 8.8 MG/DL (ref 8.7–10.5)
CHLORIDE SERPL-SCNC: 96 MMOL/L (ref 95–110)
CO2 SERPL-SCNC: 28 MMOL/L (ref 23–29)
CREAT SERPL-MCNC: 12.1 MG/DL (ref 0.5–1.4)
DIFFERENTIAL METHOD BLD: ABNORMAL
EOSINOPHIL # BLD AUTO: 0.5 K/UL (ref 0–0.5)
EOSINOPHIL NFR BLD: 7 % (ref 0–8)
ERYTHROCYTE [DISTWIDTH] IN BLOOD BY AUTOMATED COUNT: 18.9 % (ref 11.5–14.5)
EST. GFR  (NO RACE VARIABLE): 4.8 ML/MIN/1.73 M^2
GLUCOSE SERPL-MCNC: 100 MG/DL (ref 70–110)
HCT VFR BLD AUTO: 27 % (ref 40–54)
HGB BLD-MCNC: 8.5 G/DL (ref 14–18)
IMM GRANULOCYTES # BLD AUTO: 0.01 K/UL (ref 0–0.04)
IMM GRANULOCYTES NFR BLD AUTO: 0.2 % (ref 0–0.5)
LYMPHOCYTES # BLD AUTO: 1.4 K/UL (ref 1–4.8)
LYMPHOCYTES NFR BLD: 22.3 % (ref 18–48)
MAGNESIUM SERPL-MCNC: 1.8 MG/DL (ref 1.6–2.6)
MCH RBC QN AUTO: 31.5 PG (ref 27–31)
MCHC RBC AUTO-ENTMCNC: 31.5 G/DL (ref 32–36)
MCV RBC AUTO: 100 FL (ref 82–98)
MONOCYTES # BLD AUTO: 0.9 K/UL (ref 0.3–1)
MONOCYTES NFR BLD: 14.4 % (ref 4–15)
NEUTROPHILS # BLD AUTO: 3.6 K/UL (ref 1.8–7.7)
NEUTROPHILS NFR BLD: 55.5 % (ref 38–73)
NRBC BLD-RTO: 0 /100 WBC
PHOSPHATE SERPL-MCNC: 4.9 MG/DL (ref 2.7–4.5)
PLATELET # BLD AUTO: 400 K/UL (ref 150–450)
PMV BLD AUTO: 8.7 FL (ref 9.2–12.9)
POTASSIUM SERPL-SCNC: 4.3 MMOL/L (ref 3.5–5.1)
PROT SERPL-MCNC: 7.2 G/DL (ref 6–8.4)
RBC # BLD AUTO: 2.7 M/UL (ref 4.6–6.2)
SODIUM SERPL-SCNC: 137 MMOL/L (ref 136–145)
VANCOMYCIN SERPL-MCNC: 21.1 UG/ML
WBC # BLD AUTO: 6.46 K/UL (ref 3.9–12.7)

## 2024-10-27 PROCEDURE — 80202 ASSAY OF VANCOMYCIN: CPT | Performed by: INTERNAL MEDICINE

## 2024-10-27 PROCEDURE — 96366 THER/PROPH/DIAG IV INF ADDON: CPT

## 2024-10-27 PROCEDURE — 84100 ASSAY OF PHOSPHORUS: CPT | Performed by: INTERNAL MEDICINE

## 2024-10-27 PROCEDURE — 25000003 PHARM REV CODE 250: Performed by: INTERNAL MEDICINE

## 2024-10-27 PROCEDURE — 36415 COLL VENOUS BLD VENIPUNCTURE: CPT | Performed by: INTERNAL MEDICINE

## 2024-10-27 PROCEDURE — 96376 TX/PRO/DX INJ SAME DRUG ADON: CPT

## 2024-10-27 PROCEDURE — 63600175 PHARM REV CODE 636 W HCPCS: Performed by: INTERNAL MEDICINE

## 2024-10-27 PROCEDURE — G0378 HOSPITAL OBSERVATION PER HR: HCPCS

## 2024-10-27 PROCEDURE — 80053 COMPREHEN METABOLIC PANEL: CPT | Performed by: INTERNAL MEDICINE

## 2024-10-27 PROCEDURE — 83735 ASSAY OF MAGNESIUM: CPT | Performed by: INTERNAL MEDICINE

## 2024-10-27 PROCEDURE — 85025 COMPLETE CBC W/AUTO DIFF WBC: CPT | Performed by: INTERNAL MEDICINE

## 2024-10-27 RX ORDER — SODIUM CHLORIDE 9 MG/ML
INJECTION, SOLUTION INTRAVENOUS ONCE
Status: CANCELLED | OUTPATIENT
Start: 2024-10-27 | End: 2024-10-27

## 2024-10-27 RX ORDER — MUPIROCIN 20 MG/G
OINTMENT TOPICAL 2 TIMES DAILY
Status: CANCELLED | OUTPATIENT
Start: 2024-10-27 | End: 2024-11-01

## 2024-10-27 RX ADMIN — MEROPENEM 1 G: 1 INJECTION, POWDER, FOR SOLUTION INTRAVENOUS at 09:10

## 2024-10-27 RX ADMIN — MEROPENEM 1 G: 1 INJECTION, POWDER, FOR SOLUTION INTRAVENOUS at 08:10

## 2024-10-27 RX ADMIN — FLUCONAZOLE 100 MG: 2 INJECTION, SOLUTION INTRAVENOUS at 09:10

## 2024-10-27 RX ADMIN — POLYETHYLENE GLYCOL 3350 17 G: 17 POWDER, FOR SOLUTION ORAL at 09:10

## 2024-10-27 NOTE — PROGRESS NOTES
Nephrology Consult Note        Patient Name: João Ham  MRN: [id number]    Patient Class: OP- Observation   Admission Date: 10/25/2024  Length of Stay: 0 days  Date of Service: 10/27/2024    Attending Physician: Meggan Terrell DO  Primary Care Provider: Dawosn Granado MD    Reason for Consult: esrd    SUBJECTIVE:     HPI: 44M with ESRD on HD MWF and recent admission for partial colectomy, complicated by failure of anastomosis with re-operation and colostomy + sepsis, sent by primary care provider to the emergency department for admission. Home health nurse noticed a change in the surgical wound and patient noticed more drainage. Patient's blood work is close to baseline. Patient denies any fever, chills, nausea vomiting. Patient however is very hypotensive and tachycardic and has slight pain at the incision site.     10/27 VSS, no new complains. BP better. Resume HD per schedule.    Review of Systems:  Constitutional:  Negative for chills, fever, malaise/fatigue and weight loss.   HENT:  Negative for hearing loss and nosebleeds.    Eyes:  Negative for blurred vision, double vision and photophobia.   Respiratory:  Negative for cough, shortness of breath and wheezing.    Cardiovascular:  Negative for chest pain, palpitations and leg swelling.   Gastrointestinal:  Negative for abdominal pain, constipation, diarrhea, heartburn, nausea and vomiting.   Genitourinary:  Negative for dysuria, frequency and urgency.   Musculoskeletal:  Negative for falls, joint pain and myalgias.   Skin:  Negative for itching and rash.   Neurological:  Negative for dizziness, speech change, focal weakness, loss of consciousness and headaches.   Endo/Heme/Allergies:  Does not bruise/bleed easily.   Psychiatric/Behavioral:  Negative for depression and substance abuse. The patient is not nervous/anxious.      ASSESSMENT/PLAN:     ESRD on HD MWF via AVF  Hyponatremia  Next dialysis per schedule or PRN.  Continue current dialysis  "prescription and adjust as needed.  Renal diet - low K, low phos as tolerated.  No IVs or BP checks on access and/or non-dominant arm.    Anemia of CKD  Hgb and HCT are acceptable. Monitor for now.  Will provide LOR and/or IV iron as needed to keep Hgb 8-10 range.    MBD / Secondary HPT  Ca, Phos, PTH and vitamin D levels are acceptable.   Phos binders, vitamin D and analogues, calcimimetics will be given as needed.    Hypotension is better  Tolerate asymptomatic HTN up to -160.  Continue home meds. DO NOT GIVE IVF.  Needs wound care management, not sure if he needs anymore IV abx.    Thank you for allowing us to participate in the care of your patient!   We will follow the patient and provide recommendations as needed.         Laboratory:  Recent Labs   Lab 10/25/24  1710 10/26/24  0943 10/27/24  0836   * 133* 137   K 3.8 4.3 4.3   CL 93* 95 96   CO2 30* 28 28   BUN 12 16 24*   CREATININE 7.0* 9.3* 12.1*   * 87 100       Recent Labs   Lab 10/21/24  1550 10/25/24  0904 10/25/24  1710 10/26/24  0943 10/27/24  0836   CALCIUM 9.7   < > 8.4* 8.5* 8.8   ALBUMIN 3.6   < > 3.8 3.6 3.6   MG 2.1  --   --  1.7 1.8    < > = values in this interval not displayed.       Recent Labs   Lab 02/02/23  0745 05/19/23  1022 06/19/23  1031   PTH, Intact 225.6 H 335.8 H 319.0 H       No results for input(s): "POCTGLUCOSE" in the last 168 hours.    Recent Labs   Lab 10/10/22  2005 06/22/23  0446 07/16/24  0510   Hemoglobin A1C 5.5 5.5 6.1       Recent Labs   Lab 10/25/24  1710 10/26/24  0943 10/27/24  0836   WBC 5.26 6.39 6.46   HGB 8.5* 8.0* 8.5*   HCT 26.8* 26.2* 27.0*    380 400   MCV 99* 100* 100*   MCHC 31.7* 30.5* 31.5*   MONO 15.4*  0.8 14.6  0.9 14.4  0.9   EOSINOPHIL 3.2 4.5 7.0       Recent Labs   Lab 10/25/24  1710 10/26/24  0943 10/27/24  0836   BILITOT 0.5 0.4 0.4   PROT 7.7 7.0 7.2   ALBUMIN 3.8 3.6 3.6   ALKPHOS 87 78 77   ALT 57* 43 36   AST 46* 34 25       Recent Labs   Lab 10/10/22  1645 " 01/09/23  1123 06/19/23  1622 07/17/24  0505 09/19/24  1435   Color, UA Yellow   < > Straw Yellow Yellow   Appearance, UA Clear   < > Clear Hazy A Clear   pH, UA 6.0   < > 6.0 6.0 8.0   Specific Gravity, UA 1.025   < > 1.010 1.020 1.010   Protein, UA 2+ A   < > 2+ A 3+ A 2+ A   Glucose, UA Negative   < > Negative Negative Negative   Ketones, UA Negative   < > Negative Negative Negative   Urobilinogen, UA Negative  --   --  Negative Negative   Bilirubin (UA) Negative   < > Negative Negative Negative   Occult Blood UA 1+ A   < > Negative 2+ A 2+ A   Nitrite, UA Negative   < > Negative Negative Negative   RBC, UA 1   < > 0 6 H 4   WBC, UA 0   < > 2 52 H 21 H   Bacteria None   < > None None None   Hyaline Casts, UA 0   < > 0 0 0    < > = values in this interval not displayed.       Recent Labs   Lab 09/25/24  1051 10/17/24  1923 10/25/24  1732   POC PH 7.404  --   --    POC PCO2 37.7  --   --    POC HCO3 23.6 L  --   --    POC PO2 87  --   --    POC SATURATED O2 97  --   --    POC BE -1  --   --    Sample ARTERIAL VENOUS VENOUS       Microbiology Results (last 7 days)       Procedure Component Value Units Date/Time    Aerobic culture [[id number]]  (Abnormal) Collected: 10/25/24 1932    Order Status: Completed Specimen: Incision site from Abdomen Updated: 10/27/24 0701     Aerobic Bacterial Culture GRAM NEGATIVE LASHAE, LACTOSE   Moderate  Identification and susceptibility pending        GRAM NEGATIVE LASHAE, NON-LACTOSE   Many  Identification and susceptibility pending      Blood culture x two cultures. Draw prior to antibiotics. [[id number]] Collected: 10/25/24 1748    Order Status: Completed Specimen: Blood from Peripheral, Antecubital, Right Updated: 10/26/24 2032     Blood Culture, Routine No Growth to date      No Growth to date    Narrative:      Aerobic and anaerobic  Collection has been rescheduled by [facility] at 10/25/2024 16:42 Reason:   Nurse tolbert request to come back  Collection has been rescheduled  by RICHARD at 10/25/2024 17:19 Reason:   Patient has fistula must wait 15 mins  Collection has been rescheduled by [facility] at 10/25/2024 16:42 Reason:   Nurse tomasz request to come back  Collection has been rescheduled by RICHARD at 10/25/2024 17:19 Reason:   Patient has fistula must wait 15 mins    Blood culture x two cultures. Draw prior to antibiotics. [[id number]] Collected: 10/25/24 1711    Order Status: Completed Specimen: Blood from Peripheral, Antecubital, Right Updated: 10/26/24 1832     Blood Culture, Routine No Growth to date      No Growth to date    Narrative:      Aerobic and anaerobic  Collection has been rescheduled by [facility] at 10/25/2024 16:42 Reason:   Nurse tomasz request to come back  Collection has been rescheduled by [facility] at 10/25/2024 16:42 Reason:   Nurse tomasz request to come back            Review of patient's allergies indicates:   Allergen Reactions    Mushroom Swelling     Tongue swells    Tongue swells       Tongue swells       Outpatient meds:  No current facility-administered medications on file prior to encounter.     Current Outpatient Medications on File Prior to Encounter   Medication Sig Dispense Refill    aluminum-magnesium hydroxide-simethicone (MAALOX) 200-200-20 mg/5 mL Susp Take 30 mLs by mouth 4 (four) times daily as needed.      calcitRIOL (ROCALTROL) 0.25 MCG Cap Take 1 capsule (0.25 mcg total) by mouth once daily.      calcium carbonate (TUMS) 200 mg calcium (500 mg) chewable tablet Take 2 tablets (1,000 mg total) by mouth 3 (three) times daily. 180 tablet 11    cholecalciferol, vitamin D3, 125 mcg (5,000 unit) Tab Take 5,000 Units by mouth every morning.      cloNIDine (CATAPRES) 0.3 MG tablet Take 1 tablet (0.3 mg total) by mouth 3 (three) times daily as needed (SBP > 150).      epoetin mily-epbx (RETACRIT) 20,000 unit/mL injection Inject 0.67 mLs (13,400 Units total) into the skin every Mon, Wed, Fri.      hydrALAZINE (APRESOLINE) 100 MG tablet Take 1 tablet (100 mg total) by  mouth every 8 (eight) hours. Hold for SBP < 120 and before dialysis      metoprolol succinate (TOPROL-XL) 50 MG 24 hr tablet Take 150 mg by mouth once daily.      olmesartan (BENICAR) 40 MG tablet Take 1 tablet (40 mg total) by mouth once daily. 90 tablet 0    oxyCODONE (ROXICODONE) 20 mg Tab immediate release tablet Take 1 tablet by mouth every 12 (twelve) hours.      sevelamer carbonate (RENVELA) 800 mg Tab Take 2 tablets (1,600 mg total) by mouth 3 (three) times daily with meals. 180 tablet 11    acetaminophen (TYLENOL) 325 MG tablet Take 2 tablets (650 mg total) by mouth every 4 (four) hours as needed. (Patient not taking: Reported on 10/25/2024)      ALPRAZolam (XANAX) 0.25 MG tablet Take 1 tablet (0.25 mg total) by mouth nightly as needed for Insomnia. (Patient not taking: Reported on 10/25/2024)      apixaban (ELIQUIS) 2.5 mg Tab Take 2.5 mg by mouth 2 (two) times daily.      gabapentin (NEURONTIN) 100 MG capsule Take 1 capsule (100 mg total) by mouth 3 (three) times daily. (Patient not taking: Reported on 10/25/2024)      heparin sodium,porcine (HEPARIN, PORCINE,) 1,000 unit/mL injection Inject 4 mLs (4,000 Units total) into the vein as needed (line care after dialysis).      heparin sodium,porcine (HEPARIN, PORCINE,) 5,000 unit/mL injection Inject 1 mL (5,000 Units total) into the skin every 8 (eight) hours.      HYDROmorphone (DILAUDID) 4 MG tablet Take 1 tablet (4 mg total) by mouth every 4 (four) hours as needed for Pain. (Patient not taking: Reported on 10/25/2024)      insulin aspart U-100 (NOVOLOG) 100 unit/mL (3 mL) InPn pen Inject 0-5 Units into the skin before meals and at bedtime as needed (Hyperglycemia). (Patient not taking: Reported on 10/25/2024)      isosorbide mononitrate (IMDUR) 120 MG 24 hr tablet Take 120 mg by mouth every morning.      isosorbide mononitrate (IMDUR) 30 MG 24 hr tablet Take 1 tablet (30 mg total) by mouth once daily.      ketoconazole (NIZORAL) 2 % shampoo Apply topically  every other day. (Patient not taking: Reported on 10/25/2024)      LIDOcaine-prilocaine (EMLA) cream Apply topically as needed (pre dialysis).      melatonin (MELATIN) 3 mg tablet Take 2 tablets (6 mg total) by mouth nightly as needed for Insomnia. (Patient not taking: Reported on 10/25/2024)      miconazole (MICOTIN) 2 % cream Apply topically 2 (two) times daily. (Patient not taking: Reported on 10/25/2024)      ondansetron (ZOFRAN-ODT) 4 MG TbDL Take 1 tablet (4 mg total) by mouth every 6 (six) hours as needed (nausea). (Patient not taking: Reported on 10/25/2024)      oxymetazoline (AFRIN) 0.05 % nasal spray 1 spray by Nasal route 2 (two) times daily. (Patient not taking: Reported on 10/25/2024)      simethicone (MYLICON) 80 MG chewable tablet Take 1 tablet (80 mg total) by mouth 3 (three) times daily as needed for Flatulence.         Scheduled meds:   enoxparin  1 mg/kg Subcutaneous Daily    famotidine  20 mg Oral Daily    fluconazole (DIFLUCAN) IV (PEDS and ADULTS)  100 mg Intravenous Q24H    meropenem IV (PEDS and ADULTS)  1 g Intravenous Q12H    polyethylene glycol  17 g Oral Daily       Infusions:      PRN meds:    Current Facility-Administered Medications:     acetaminophen, 650 mg, Oral, Q8H PRN    acetaminophen, 650 mg, Oral, Q4H PRN    aluminum-magnesium hydroxide-simethicone, 30 mL, Oral, QID PRN    HYDROcodone-acetaminophen, 1 tablet, Oral, Q6H PRN    melatonin, 6 mg, Oral, Nightly PRN    naloxone, 0.02 mg, Intravenous, PRN    ondansetron, 4 mg, Intravenous, Q6H PRN    Pharmacy to dose Vancomycin consult, , , Once **AND** vancomycin - pharmacy to dose, , Intravenous, pharmacy to manage frequency    Past Medical History:   Diagnosis Date    Allergy     Anemia     Anxiety     CHF (congestive heart failure)     Depression     High blood pressure with chronic kidney disease, stage 5 chronic kidney disease or end stage renal disease     Hypertension      Past Surgical History:   Procedure Laterality Date     ABSCESS DRAINAGE Left 9/17/2024    Procedure: DRAINAGE, ABSCESS, GROIN;  Surgeon: Galileo Connors MD;  Location: [facility] OR;  Service: General;  Laterality: Left;    COLON RESECTION  9/25/2024    Procedure: COLON RESECTION;  Surgeon: Galileo Connors MD;  Location: [facility] OR;  Service: General;;    FISTULOGRAM Bilateral 4/30/2024    Procedure: Fistulogram;  Surgeon: Khoobehi, Ali, MD;  Location: [facility] CATH/EP LAB;  Service: Vascular;  Laterality: Bilateral;    FISTULOGRAM Left 9/24/2024    Procedure: Fistulogram;  Surgeon: Lorraine Salvador MD;  Location: [facility] CATH/EP LAB;  Service: General;  Laterality: Left;    HERNIA REPAIR Right     With mesh    INSERTION, CATHETER, TUNNELED N/A 6/22/2023    Procedure: Insertion,catheter,tunneled;  Surgeon: Everett Caicedo MD;  Location: [facility] OR;  Service: General;  Laterality: N/A;    LAPAROTOMY, EXPLORATORY N/A 9/25/2024    Procedure: LAPAROTOMY, EXPLORATORY;  Surgeon: Galileo Connors MD;  Location: [facility] OR;  Service: General;  Laterality: N/A;    MOBILIZATION OF SPLENIC FLEXURE  9/25/2024    Procedure: MOBILIZATION, SPLENIC FLEXURE;  Surgeon: Galileo Connors MD;  Location: [facility] OR;  Service: General;;    PERCUTANEOUS TRANSLUMINAL ANGIOPLASTY OF ARTERIOVENOUS FISTULA Left 4/30/2024    Procedure: PTA, AV FISTULA;  Surgeon: Khoobehi, Ali, MD;  Location: [facility] CATH/EP LAB;  Service: Vascular;  Laterality: Left;    PERCUTANEOUS TRANSLUMINAL ANGIOPLASTY OF ARTERIOVENOUS FISTULA Left 9/24/2024    Procedure: PTA, AV FISTULA;  Surgeon: Lorraine Salvador MD;  Location: [facility] CATH/EP LAB;  Service: General;  Laterality: Left;    PHLEBOGRAPHY N/A 7/19/2024    Procedure: Venogram;  Surgeon: Khoobehi, Ali, MD;  Location: [facility] CATH/EP LAB;  Service: Vascular;  Laterality: N/A;    REMOVAL, TUNNELED CATH Right 7/19/2024    Procedure: REMOVAL, TUNNELED CATH;  Surgeon: Khoobehi, Ali, MD;  Location: [facility] CATH/EP LAB;  Service: Vascular;  Laterality: Right;    ROBOT-ASSISTED LAPAROSCOPIC RESECTION OF  SIGMOID COLON USING DA DELANO XI N/A 9/17/2024    Procedure: XI ROBOTIC SIGMOID RESECTION, WITH ANASTAMOSIS;  Surgeon: Galileo Connors MD;  Location: [facility];  Service: General;  Laterality: N/A;  possible open have instruments available     Family History   Problem Relation Name Age of Onset    Hypertension Mother       Social History     Tobacco Use    Smoking status: Never     Passive exposure: Never    Smokeless tobacco: Never   Substance Use Topics    Alcohol use: Never    Drug use: Never       OBJECTIVE:     Vital Signs and IO:  Temp:  [98.1 °F (36.7 °C)-98.7 °F (37.1 °C)]   Pulse:  []   Resp:  [18]   BP: (110-155)/(63-90)   SpO2:  [95 %-98 %]   I/O last 3 completed shifts:  In: 3090 [P.O.:480; I.V.:910; IV Piggyback:1700]  Out: 750 [Stool:750]  Wt Readings from Last 5 Encounters:   10/26/24 119.6 kg (263 lb 10.7 oz)   10/25/24 116.7 kg (257 lb 4.4 oz)   10/19/24 120.5 kg (265 lb 10.5 oz)   09/18/24 134 kg (295 lb 6.7 oz)   08/29/24 131.3 kg (289 lb 7.4 oz)     Body mass index is 33.85 kg/m².    Physical Exam  Constitutional:       General: Patient is not in acute distress.     Appearance: Patient is well-developed. She is not diaphoretic.   HENT:      Head: Normocephalic and atraumatic.      Mouth/Throat: Mucous membranes are moist.   Eyes:      General: No scleral icterus.     Pupils: Pupils are equal, round, and reactive to light.   Cardiovascular:      Rate and Rhythm: Normal rate and regular rhythm.   Pulmonary:      Effort: Pulmonary effort is normal. No respiratory distress.      Breath sounds: No stridor.   Abdominal:      General: There is no distension.      Palpations: Abdomen is soft.   Musculoskeletal:         General: No deformity. Normal range of motion.      Cervical back: Neck supple.   Skin:     General: Skin is warm and dry.      Findings: No rash present. No erythema.   Neurological:      Mental Status: Patient is alert and oriented to person, place, and time.      Cranial Nerves: No  cranial nerve deficit.   Psychiatric:         Behavior: Behavior normal.          Patient care time was spent personally by me on the following activities:     Obtaining a history.  Examination of patient.  Providing medical care at the patients bedside.  Developing a treatment plan with patient or surrogate and bedside caregivers.  Ordering and reviewing laboratory studies, radiographic studies, pulse oximetry.  Ordering and performing treatments and interventions.  Evaluation of patient's response to treatment.  Discussions with consultants while on the unit and immediately available to the patient.  Re-evaluation of the patient's condition.  Documentation in the medical record.     Jeremy Madrid MD    [facility]  [address] [place] [id number]    [phone] - tel  [phone] - fax    10/27/2024

## 2024-10-27 NOTE — PROGRESS NOTES
[facility] Medicine  Progress Note    Patient Name: João Ham  MRN: [id number]  Patient Class: OP- Observation   Admission Date: 10/25/2024  Length of Stay: 0 days  Attending Physician: Meggan Terrell DO  Primary Care Provider: Dawson Granado MD        Subjective:     Principal Problem:Postoperative wound breakdown        HPI:  44 year old pt getting admitted with suspected intra abdominal infection and draining wound in abdomen  This pt is HD dependent for ESRD and was in this hospital last month  Last month he got admitted with colonic perforation and abscess   He underwent laparotomy/colectomy and eventually colostomy  He went home 96 hrs ago  HH nurse noticed increased drainage from wound area and asked him to come to ER  He is on Eliquis which pt doesn't know why he is taking that  Denied fever but c/o on and off abdominal pain   In the ER today he was hypotensive and tachycardic     Overview/Hospital Course:  João Ham was monitored closely during his hospitalization.  CT abdomen and pelvis performed showed postsurgical changes in the left inguinal canal with associated infectious/inflammatory fluid and edema and questionable small abscess consistent with prior CT. He was hypotensive on presentation which improved with IVF.  He was placed on IV abx.  General surgery was consulted.      Interval History:   No acute events overnight.  Blood pressure stable.  Awaiting surgical consult.  Nephrology following.  Wound Care following.  Review of Systems   Constitutional:  Negative for activity change, appetite change, chills, fatigue and fever.   Respiratory: Negative.     Cardiovascular: Negative.    Gastrointestinal:  Positive for abdominal pain.   Genitourinary: Negative.    Musculoskeletal: Negative.    Skin:  Positive for wound.     Objective:     Vital Signs (Most Recent):  Temp: 98.1 °F (36.7 °C) (10/27/24 0712)  Pulse: 86 (10/27/24 0712)  Resp: 18 (10/27/24 0712)  BP: (!)  155/84 (10/27/24 0712)  SpO2: 98 % (10/27/24 0712) Vital Signs (24h Range):  Temp:  [98.1 °F (36.7 °C)-98.7 °F (37.1 °C)] 98.1 °F (36.7 °C)  Pulse:  [] 86  Resp:  [18] 18  SpO2:  [95 %-98 %] 98 %  BP: (110-155)/(63-90) 155/84     Weight: 119.6 kg (263 lb 10.7 oz)  Body mass index is 33.85 kg/m².    Intake/Output Summary (Last 24 hours) at 10/27/2024 1057  Last data filed at 10/26/2024 2211  Gross per 24 hour   Intake 410 ml   Output 450 ml   Net -40 ml         Physical Exam  Vitals and nursing note reviewed.   Constitutional:       Appearance: Normal appearance. He is well-developed.   HENT:      Nose: No septal deviation.   Neck:      Thyroid: No thyroid mass.      Vascular: No JVD.      Trachea: No tracheal tenderness or tracheal deviation.   Cardiovascular:      Rate and Rhythm: Normal rate.      Pulses: Normal pulses.      Heart sounds: Normal heart sounds, S1 normal and S2 normal.   Pulmonary:      Effort: Pulmonary effort is normal.      Breath sounds: Normal breath sounds. No decreased breath sounds, wheezing, rhonchi or rales.   Abdominal:      General: Bowel sounds are normal. There is no distension.      Palpations: Abdomen is soft. There is no hepatomegaly, splenomegaly or mass.      Tenderness: There is abdominal tenderness.      Comments: Colostomy present   Skin:     General: Skin is warm.      Findings: No rash.   Neurological:      General: No focal deficit present.      Mental Status: He is alert and oriented to person, place, and time.   Psychiatric:         Mood and Affect: Mood is depressed. Affect is flat.             Significant Labs: All pertinent labs within the past 24 hours have been reviewed.  CBC:   Recent Labs   Lab 10/25/24  1710 10/26/24  0943 10/27/24  0836   WBC 5.26 6.39 6.46   HGB 8.5* 8.0* 8.5*   HCT 26.8* 26.2* 27.0*    380 400     CMP:   Recent Labs   Lab 10/25/24  1710 10/26/24  0943 10/27/24  0836   * 133* 137   K 3.8 4.3 4.3   CL 93* 95 96   CO2 30* 28 28    * 87 100   BUN 12 16 24*   CREATININE 7.0* 9.3* 12.1*   CALCIUM 8.4* 8.5* 8.8   PROT 7.7 7.0 7.2   ALBUMIN 3.8 3.6 3.6   BILITOT 0.5 0.4 0.4   ALKPHOS 87 78 77   AST 46* 34 25   ALT 57* 43 36   ANIONGAP 10 10 13     Magnesium:   Recent Labs   Lab 10/26/24  0943 10/27/24  0836   MG 1.7 1.8     Microbiology Results (last 7 days)       Procedure Component Value Units Date/Time    Aerobic culture [[id number]]  (Abnormal) Collected: 10/25/24 1932    Order Status: Completed Specimen: Incision site from Abdomen Updated: 10/27/24 0701     Aerobic Bacterial Culture GRAM NEGATIVE LASHAE, LACTOSE   Moderate  Identification and susceptibility pending        GRAM NEGATIVE LASHAE, NON-LACTOSE   Many  Identification and susceptibility pending      Blood culture x two cultures. Draw prior to antibiotics. [[id number]] Collected: 10/25/24 1748    Order Status: Completed Specimen: Blood from Peripheral, Antecubital, Right Updated: 10/26/24 2032     Blood Culture, Routine No Growth to date      No Growth to date    Narrative:      Aerobic and anaerobic  Collection has been rescheduled by BNI at 10/25/2024 16:42 Reason:   Nurse tomasz request to come back  Collection has been rescheduled by Z at 10/25/2024 17:19 Reason:   Patient has fistula must wait 15 mins  Collection has been rescheduled by BNI at 10/25/2024 16:42 Reason:   Nurse tomasz request to come back  Collection has been rescheduled by [email or web address] at 10/25/2024 17:19 Reason:   Patient has fistula must wait 15 mins    Blood culture x two cultures. Draw prior to antibiotics. [[id number]] Collected: 10/25/24 1719    Order Status: Completed Specimen: Blood from Peripheral, Antecubital, Right Updated: 10/26/24 1832     Blood Culture, Routine No Growth to date      No Growth to date    Narrative:      Aerobic and anaerobic  Collection has been rescheduled by BNI at 10/25/2024 16:42 Reason:   Nurse tomasz request to come back  Collection has been rescheduled by I at  10/25/2024 16:42 Reason:   Nurse tomasz request to come back            Significant Imaging: I have reviewed all pertinent imaging results/findings within the past 24 hours.  Imaging Results              CT Abdomen Pelvis  Without Contrast (Final result)  Result time 10/25/24 22:11:31      Final result by Brant Prieto MD (10/25/24 22:11:31)                   Impression:      Postsurgical changes of exploratory laparotomy at the midline with mild associated fluid and edema. Interval dehiscence or intervention at the laparotomy site. Correlate for recent intervention.  Postsurgical changes of colon with colostomy in the mid abdomen, left of midline. Mild infectious/inflammatory edema surrounding colostomy, similar to prior.  Previously noted ill-defined intraperitoneal/mesenteric fluid collection adjacent has decreased in size, now 28 x 22 mm (previously 51 x 48 mm). Slight decrease adjacent infectious/inflammatory edema throughout the mesentery.    Postsurgical changes in the left inguinal canal with associated infectious/inflammatory fluid and edema and questionable small abscess. Overall, findings are slightly improved since prior.    Bladder wall thickening, although collapsed with mild adjacent fat stranding. Correlate for infectious/inflammatory cystitis.    Other findings above.      Electronically signed by: Brant Prieto  Date:    10/25/2024  Time:    22:11               Narrative:    EXAMINATION:  CT ABDOMEN PELVIS WITHOUT CONTRAST    CLINICAL HISTORY:  Abdominal abscess/infection suspected;    TECHNIQUE:  Low dose axial images, sagittal and coronal reformations were obtained from the lung bases to the pubic symphysis.  Oral contrast was not administered.    COMPARISON:  10/18/2024    FINDINGS:  Bilateral gynecomastia.    Lung bases are clear.    Unremarkable liver.  Cholelithiasis.  Unremarkable spleen and pancreas.    Kidneys are small and atrophic.  Left renal cyst.    Postsurgical changes  of exploratory laparotomy at the midline with mild associated fluid and edema.  Interval dehiscence or intervention at the laparotomy site.  Correlate for recent intervention.  Postsurgical changes of colon with colostomy in the mid abdomen, left of midline.  Mild infectious/inflammatory edema surrounding colostomy, similar to prior.  Previously noted ill-defined intraperitoneal/mesenteric fluid collection adjacent has decreased in size, now 28 x 22 mm (previously 51 x 48 mm).  Slight decrease adjacent infectious/inflammatory edema throughout the mesentery.    Postsurgical changes in the left inguinal canal with associated infectious/inflammatory fluid and edema and questionable small abscess.  Overall, findings are slightly improved since prior.    Bladder wall thickening, although collapsed with mild adjacent fat stranding. Correlate for infectious/inflammatory cystitis.    Colonic diverticulosis, without diverticulitis.  Mild thickening of small-bowel loops, likely reactive.  No small bowel obstruction.    Nonspecific bilateral inguinal lymphadenopathy.    Bones are intact.                                       X-Ray Chest AP Portable (Final result)  Result time 10/25/24 17:44:09      Final result by Brant Prieto MD (10/25/24 17:44:09)                   Impression:      No acute findings.      Electronically signed by: Brant Prieto  Date:    10/25/2024  Time:    17:44               Narrative:    EXAMINATION:  XR CHEST AP PORTABLE    CLINICAL HISTORY:  Sepsis;    TECHNIQUE:  Single frontal view of the chest was performed.    COMPARISON:  10/17/2024    FINDINGS:  Lungs are clear. No focal consolidation. No pleural effusion. No pneumothorax. Normal heart size.                                       Assessment/Plan:      * Postoperative wound breakdown  As mentioned in HPI  Started on iv vanc/iv meropenem/iv diflucan  CT abdomen/pelvis -  Postsurgical changes of exploratory laparotomy at the midline  with mild associated fluid and edema. Interval dehiscence or intervention at the laparotomy site. Correlate for recent intervention. Postsurgical changes of colon with colostomy in the mid abdomen, left of midline. Mild infectious/inflammatory edema surrounding colostomy, similar to prior. Previously noted ill-defined intraperitoneal/mesenteric fluid collection adjacent has decreased in size, now 28 x 22 mm (previously 51 x 48 mm). Slight decrease adjacent infectious/inflammatory edema throughout the mesentery.   Postsurgical changes in the left inguinal canal with associated infectious/inflammatory fluid and edema and questionable small abscess.   General surgery consult      History of ESBL E. coli infection    Aerobic Bacterial Culture ESCHERICHIA COLI  From broth only   Abnormal     Susceptibility     Escherichia coli     CULTURE, AEROBIC  (SPECIFY SOURCE)     Amp/Sulbactam 16/8 mcg/mL Intermediate     Ampicillin >16 mcg/mL Resistant     Cefazolin 8 mcg/mL Resistant     Cefepime <=2 mcg/mL Sensitive     Ceftriaxone <=1 mcg/mL Sensitive     Ciprofloxacin <=0.25 mcg/mL Sensitive     Ertapenem <=0.5 mcg/mL Sensitive     Gentamicin <=2 mcg/mL Sensitive     Levofloxacin <=0.5 mcg/mL Sensitive     Meropenem <=1 mcg/mL Sensitive     Piperacillin/Tazo <=8 mcg/mL Sensitive     Tetracycline <=4 mcg/mL Sensitive     Tobramycin <=2 mcg/mL Sensitive     Trimeth/Sulfa <=2/38 mcg/mL Sensitive              Anticoagulated  Change NOAC to SQ lovenox      Hypotension  Resolved  Improved with IVFH  Continue to monitor        Sepsis  This patient does have evidence of infective focus  My overall impression is sepsis.  Source: Abdominal  Antibiotics given-   Antibiotics (72h ago, onward)      Start     Stop Route Frequency Ordered    10/26/24 0830  meropenem injection 1 g         -- IV Every 12 hours (non-standard times) 10/25/24 2117    10/25/24 1741  vancomycin - pharmacy to dose  (vancomycin IVPB (PEDS and ADULTS))        Placed  "in "And" Linked Group    -- IV pharmacy to manage frequency 10/25/24 1642          Latest lactate reviewed-  Recent Labs   Lab 10/25/24  1732 10/25/24  2131 10/26/24  0943   LACTATE  --    < > 1.0   POCLAC 3.21*  --   --     < > = values in this interval not displayed.       Organ dysfunction indicated by  hypotension, tachycardia, tachypnea    Fluid challenge Actual Body weight- Patient will receive 30ml/kg actual body weight to calculate fluid bolus for treatment of septic shock.     Post- resuscitation assessment Yes Perfusion exam was performed within 6 hours of septic shock presentation after bolus shows Adequate tissue perfusion assessed by non-invasive monitoring       Will Not start Pressors- Levophed for MAP of 65  Source control achieved by: IV antibiotics; general surgery consulted.    ESRD (end stage renal disease)  Creatine stable for now. BMP reviewed- noted Estimated Creatinine Clearance: 10.8 mL/min (A) (based on SCr of 12.1 mg/dL (H)). according to latest data. Based on current GFR, CKD stage is end stage.  Monitor UOP and serial BMP and adjust therapy as needed. Renally dose meds. Avoid nephrotoxic medications and procedures.  Nephrology following   Dialysis MWF        VTE Risk Mitigation (From admission, onward)           Ordered     enoxaparin injection 120 mg  Daily         10/26/24 1733     IP VTE HIGH RISK PATIENT  Once         10/25/24 1757     Place sequential compression device  Until discontinued         10/25/24 1757                    Discharge Planning   MARIA ISABEL: 10/29/2024     Code Status: Full Code   Is the patient medically ready for discharge?:     Reason for patient still in hospital (select all that apply): Patient trending condition, Treatment, and Consult recommendations  Discharge Plan A: Home                  Kat French NP  Department of Hospital Medicine   [facility]    "

## 2024-10-27 NOTE — ASSESSMENT & PLAN NOTE
"This patient does have evidence of infective focus  My overall impression is sepsis.  Source: Abdominal  Antibiotics given-   Antibiotics (72h ago, onward)    Start     Stop Route Frequency Ordered    10/26/24 0830  meropenem injection 1 g         -- IV Every 12 hours (non-standard times) 10/25/24 2117    10/25/24 1741  vancomycin - pharmacy to dose  (vancomycin IVPB (PEDS and ADULTS))        Placed in "And" Linked Group    -- IV pharmacy to manage frequency 10/25/24 1642        Latest lactate reviewed-  Recent Labs   Lab 10/25/24  1732 10/25/24  2131 10/26/24  0943   LACTATE  --    < > 1.0   POCLAC 3.21*  --   --     < > = values in this interval not displayed.       Organ dysfunction indicated by  hypotension, tachycardia, tachypnea    Fluid challenge Actual Body weight- Patient will receive 30ml/kg actual body weight to calculate fluid bolus for treatment of septic shock.     Post- resuscitation assessment Yes Perfusion exam was performed within 6 hours of septic shock presentation after bolus shows Adequate tissue perfusion assessed by non-invasive monitoring       Will Not start Pressors- Levophed for MAP of 65  Source control achieved by: IV antibiotics; general surgery consulted.  "

## 2024-10-27 NOTE — PROGRESS NOTES
Pharmacokinetic Assessment Follow Up: IV Vancomycin    Vancomycin serum concentration assessment(s):    The random level was drawn correctly and can be used to guide therapy at this time. The measurement is above the desired definitive target range of 15 to 20 mcg/mL.    Vancomycin Regimen Plan:    Re-dose when the random level is less than 20 mcg/mL, next level to be drawn at 0430 on 10/28    Drug levels (last 3 results):  Recent Labs   Lab Result Units 10/27/24  0836   Vancomycin, Random ug/mL 21.1       Pharmacy will continue to follow and monitor vancomycin.    Please contact pharmacy at extension [phone] for questions regarding this assessment.  [phone]  Thank you for the consult,   Bety Malin       Patient brief summary:  João Ham is a 44 y.o. male initiated on antimicrobial therapy with IV Vancomycin for treatment of bacteremia    The patient's current regimen is intermittent dosing    Drug Allergies:   Review of patient's allergies indicates:   Allergen Reactions    Mushroom Swelling     Tongue swells    Tongue swells       Tongue swells       Actual Body Weight:   119.6    Renal Function:   Estimated Creatinine Clearance: 10.8 mL/min (A) (based on SCr of 12.1 mg/dL (H)).,     Dialysis Method (if applicable):  intermittent HD    CBC (last 72 hours):  Recent Labs   Lab Result Units 10/25/24  0904 10/25/24  1710 10/26/24  0943 10/27/24  0836   WBC K/uL 5.41 5.26 6.39 6.46   Hemoglobin g/dL 9.0* 8.5* 8.0* 8.5*   Hematocrit % 28.4* 26.8* 26.2* 27.0*   Platelets K/uL 398 376 380 400   Gran % % 52.4 60.6 58.7 55.5   Lymph % % 25.1 20.0 21.6 22.3   Mono % % 15.5* 15.4* 14.6 14.4   Eosinophil % % 6.1 3.2 4.5 7.0   Basophil % % 0.7 0.6 0.3 0.6   Differential Method  Automated Automated Automated Automated       Metabolic Panel (last 72 hours):  Recent Labs   Lab Result Units 10/25/24  0904 10/25/24  1710 10/26/24  0943 10/27/24  0836   Sodium mmol/L 139 133* 133* 137   Potassium mmol/L 4.8 3.8 4.3 4.3    Chloride mmol/L 96 93* 95 96   CO2 mmol/L 25 30* 28 28   Glucose mg/dL 101 115* 87 100   BUN mg/dL 30* 12 16 24*   Creatinine mg/dL 14.1* 7.0* 9.3* 12.1*   Albumin g/dL 3.3* 3.8 3.6 3.6   Total Bilirubin mg/dL 0.5 0.5 0.4 0.4   Alkaline Phosphatase U/L 102 87 78 77   AST U/L 55* 46* 34 25   ALT U/L 72* 57* 43 36   Magnesium mg/dL  --   --  1.7 1.8       Vancomycin Administrations:  vancomycin given in the last 96 hours                     vancomycin (VANCOCIN) 2,000 mg in D5W 500 mL IVPB (mg) 2,000 mg New Bag 10/25/24 7683                    Microbiologic Results:  Microbiology Results (last 7 days)       Procedure Component Value Units Date/Time    Aerobic culture [[id number]]  (Abnormal) Collected: 10/25/24 1932    Order Status: Completed Specimen: Incision site from Abdomen Updated: 10/27/24 0701     Aerobic Bacterial Culture GRAM NEGATIVE LASHAE, LACTOSE   Moderate  Identification and susceptibility pending        GRAM NEGATIVE LASHAE, NON-LACTOSE   Many  Identification and susceptibility pending      Blood culture x two cultures. Draw prior to antibiotics. [[id number]] Collected: 10/25/24 1748    Order Status: Completed Specimen: Blood from Peripheral, Antecubital, Right Updated: 10/26/24 2032     Blood Culture, Routine No Growth to date      No Growth to date    Narrative:      Aerobic and anaerobic  Collection has been rescheduled by BNI at 10/25/2024 16:42 Reason:   Nurse tomasz request to come back  Collection has been rescheduled by [email or web address] at 10/25/2024 17:19 Reason:   Patient has fistula must wait 15 mins  Collection has been rescheduled by BNI at 10/25/2024 16:42 Reason:   Nurse tomasz request to come back  Collection has been rescheduled by [email or web address] at 10/25/2024 17:19 Reason:   Patient has fistula must wait 15 mins    Blood culture x two cultures. Draw prior to antibiotics. [[id number]] Collected: 10/25/24 1719    Order Status: Completed Specimen: Blood from Peripheral, Antecubital, Right  Updated: 10/26/24 1832     Blood Culture, Routine No Growth to date      No Growth to date    Narrative:      Aerobic and anaerobic  Collection has been rescheduled by BNI at 10/25/2024 16:42 Reason:   Nurse tomasz request to come back  Collection has been rescheduled by BNI at 10/25/2024 16:42 Reason:   Nurse tomasz request to come back

## 2024-10-27 NOTE — SUBJECTIVE & OBJECTIVE
Interval History:   No acute events overnight.  Blood pressure stable.  Awaiting surgical consult.  Nephrology following.  Wound Care following.  Review of Systems   Constitutional:  Negative for activity change, appetite change, chills, fatigue and fever.   Respiratory: Negative.     Cardiovascular: Negative.    Gastrointestinal:  Positive for abdominal pain.   Genitourinary: Negative.    Musculoskeletal: Negative.    Skin:  Positive for wound.     Objective:     Vital Signs (Most Recent):  Temp: 98.1 °F (36.7 °C) (10/27/24 0712)  Pulse: 86 (10/27/24 0712)  Resp: 18 (10/27/24 0712)  BP: (!) 155/84 (10/27/24 0712)  SpO2: 98 % (10/27/24 0712) Vital Signs (24h Range):  Temp:  [98.1 °F (36.7 °C)-98.7 °F (37.1 °C)] 98.1 °F (36.7 °C)  Pulse:  [] 86  Resp:  [18] 18  SpO2:  [95 %-98 %] 98 %  BP: (110-155)/(63-90) 155/84     Weight: 119.6 kg (263 lb 10.7 oz)  Body mass index is 33.85 kg/m².    Intake/Output Summary (Last 24 hours) at 10/27/2024 1057  Last data filed at 10/26/2024 2211  Gross per 24 hour   Intake 410 ml   Output 450 ml   Net -40 ml         Physical Exam  Vitals and nursing note reviewed.   Constitutional:       Appearance: Normal appearance. He is well-developed.   HENT:      Nose: No septal deviation.   Neck:      Thyroid: No thyroid mass.      Vascular: No JVD.      Trachea: No tracheal tenderness or tracheal deviation.   Cardiovascular:      Rate and Rhythm: Normal rate.      Pulses: Normal pulses.      Heart sounds: Normal heart sounds, S1 normal and S2 normal.   Pulmonary:      Effort: Pulmonary effort is normal.      Breath sounds: Normal breath sounds. No decreased breath sounds, wheezing, rhonchi or rales.   Abdominal:      General: Bowel sounds are normal. There is no distension.      Palpations: Abdomen is soft. There is no hepatomegaly, splenomegaly or mass.      Tenderness: There is abdominal tenderness.      Comments: Colostomy present   Skin:     General: Skin is warm.      Findings: No  rash.   Neurological:      General: No focal deficit present.      Mental Status: He is alert and oriented to person, place, and time.   Psychiatric:         Mood and Affect: Mood is depressed. Affect is flat.             Significant Labs: All pertinent labs within the past 24 hours have been reviewed.  CBC:   Recent Labs   Lab 10/25/24  1710 10/26/24  0943 10/27/24  0836   WBC 5.26 6.39 6.46   HGB 8.5* 8.0* 8.5*   HCT 26.8* 26.2* 27.0*    380 400     CMP:   Recent Labs   Lab 10/25/24  1710 10/26/24  0943 10/27/24  0836   * 133* 137   K 3.8 4.3 4.3   CL 93* 95 96   CO2 30* 28 28   * 87 100   BUN 12 16 24*   CREATININE 7.0* 9.3* 12.1*   CALCIUM 8.4* 8.5* 8.8   PROT 7.7 7.0 7.2   ALBUMIN 3.8 3.6 3.6   BILITOT 0.5 0.4 0.4   ALKPHOS 87 78 77   AST 46* 34 25   ALT 57* 43 36   ANIONGAP 10 10 13     Magnesium:   Recent Labs   Lab 10/26/24  0943 10/27/24  0836   MG 1.7 1.8     Microbiology Results (last 7 days)       Procedure Component Value Units Date/Time    Aerobic culture [[id number]]  (Abnormal) Collected: 10/25/24 1932    Order Status: Completed Specimen: Incision site from Abdomen Updated: 10/27/24 0701     Aerobic Bacterial Culture GRAM NEGATIVE LASHAE, LACTOSE   Moderate  Identification and susceptibility pending        GRAM NEGATIVE LASHAE, NON-LACTOSE   Many  Identification and susceptibility pending      Blood culture x two cultures. Draw prior to antibiotics. [[id number]] Collected: 10/25/24 1748    Order Status: Completed Specimen: Blood from Peripheral, Antecubital, Right Updated: 10/26/24 2032     Blood Culture, Routine No Growth to date      No Growth to date    Narrative:      Aerobic and anaerobic  Collection has been rescheduled by BNI at 10/25/2024 16:42 Reason:   Nurse tomasz request to come back  Collection has been rescheduled by RICHARD at 10/25/2024 17:19 Reason:   Patient has fistula must wait 15 mins  Collection has been rescheduled by BNI at 10/25/2024 16:42 Reason:    Nurse tomasz request to come back  Collection has been rescheduled by RICHARD at 10/25/2024 17:19 Reason:   Patient has fistula must wait 15 mins    Blood culture x two cultures. Draw prior to antibiotics. [[id number]] Collected: 10/25/24 1719    Order Status: Completed Specimen: Blood from Peripheral, Antecubital, Right Updated: 10/26/24 1832     Blood Culture, Routine No Growth to date      No Growth to date    Narrative:      Aerobic and anaerobic  Collection has been rescheduled by BNI at 10/25/2024 16:42 Reason:   Nurse tomasz request to come back  Collection has been rescheduled by BNI at 10/25/2024 16:42 Reason:   Nurse tomasz request to come back            Significant Imaging: I have reviewed all pertinent imaging results/findings within the past 24 hours.  Imaging Results              CT Abdomen Pelvis  Without Contrast (Final result)  Result time 10/25/24 22:11:31      Final result by Brant Prieto MD (10/25/24 22:11:31)                   Impression:      Postsurgical changes of exploratory laparotomy at the midline with mild associated fluid and edema. Interval dehiscence or intervention at the laparotomy site. Correlate for recent intervention.  Postsurgical changes of colon with colostomy in the mid abdomen, left of midline. Mild infectious/inflammatory edema surrounding colostomy, similar to prior.  Previously noted ill-defined intraperitoneal/mesenteric fluid collection adjacent has decreased in size, now 28 x 22 mm (previously 51 x 48 mm). Slight decrease adjacent infectious/inflammatory edema throughout the mesentery.    Postsurgical changes in the left inguinal canal with associated infectious/inflammatory fluid and edema and questionable small abscess. Overall, findings are slightly improved since prior.    Bladder wall thickening, although collapsed with mild adjacent fat stranding. Correlate for infectious/inflammatory cystitis.    Other findings above.      Electronically signed  by: Brant Prieto  Date:    10/25/2024  Time:    22:11               Narrative:    EXAMINATION:  CT ABDOMEN PELVIS WITHOUT CONTRAST    CLINICAL HISTORY:  Abdominal abscess/infection suspected;    TECHNIQUE:  Low dose axial images, sagittal and coronal reformations were obtained from the lung bases to the pubic symphysis.  Oral contrast was not administered.    COMPARISON:  10/18/2024    FINDINGS:  Bilateral gynecomastia.    Lung bases are clear.    Unremarkable liver.  Cholelithiasis.  Unremarkable spleen and pancreas.    Kidneys are small and atrophic.  Left renal cyst.    Postsurgical changes of exploratory laparotomy at the midline with mild associated fluid and edema.  Interval dehiscence or intervention at the laparotomy site.  Correlate for recent intervention.  Postsurgical changes of colon with colostomy in the mid abdomen, left of midline.  Mild infectious/inflammatory edema surrounding colostomy, similar to prior.  Previously noted ill-defined intraperitoneal/mesenteric fluid collection adjacent has decreased in size, now 28 x 22 mm (previously 51 x 48 mm).  Slight decrease adjacent infectious/inflammatory edema throughout the mesentery.    Postsurgical changes in the left inguinal canal with associated infectious/inflammatory fluid and edema and questionable small abscess.  Overall, findings are slightly improved since prior.    Bladder wall thickening, although collapsed with mild adjacent fat stranding. Correlate for infectious/inflammatory cystitis.    Colonic diverticulosis, without diverticulitis.  Mild thickening of small-bowel loops, likely reactive.  No small bowel obstruction.    Nonspecific bilateral inguinal lymphadenopathy.    Bones are intact.                                       X-Ray Chest AP Portable (Final result)  Result time 10/25/24 17:44:09      Final result by Brant Prieto MD (10/25/24 17:44:09)                   Impression:      No acute findings.      Electronically  signed by: Brant Prieto  Date:    10/25/2024  Time:    17:44               Narrative:    EXAMINATION:  XR CHEST AP PORTABLE    CLINICAL HISTORY:  Sepsis;    TECHNIQUE:  Single frontal view of the chest was performed.    COMPARISON:  10/17/2024    FINDINGS:  Lungs are clear. No focal consolidation. No pleural effusion. No pneumothorax. Normal heart size.

## 2024-10-27 NOTE — ASSESSMENT & PLAN NOTE
Creatine stable for now. BMP reviewed- noted Estimated Creatinine Clearance: 10.8 mL/min (A) (based on SCr of 12.1 mg/dL (H)). according to latest data. Based on current GFR, CKD stage is end stage.  Monitor UOP and serial BMP and adjust therapy as needed. Renally dose meds. Avoid nephrotoxic medications and procedures.  Nephrology following   Dialysis MWF

## 2024-10-28 PROBLEM — I95.9 HYPOTENSION: Status: RESOLVED | Noted: 2024-10-25 | Resolved: 2024-10-28

## 2024-10-28 LAB
ALBUMIN SERPL BCP-MCNC: 3.5 G/DL (ref 3.5–5.2)
ANION GAP SERPL CALC-SCNC: 10 MMOL/L (ref 8–16)
BACTERIA SPEC AEROBE CULT: ABNORMAL
BACTERIA SPEC AEROBE CULT: ABNORMAL
BASOPHILS # BLD AUTO: 0.03 K/UL (ref 0–0.2)
BASOPHILS NFR BLD: 0.5 % (ref 0–1.9)
BUN SERPL-MCNC: 30 MG/DL (ref 6–20)
CALCIUM SERPL-MCNC: 8.9 MG/DL (ref 8.7–10.5)
CHLORIDE SERPL-SCNC: 99 MMOL/L (ref 95–110)
CO2 SERPL-SCNC: 28 MMOL/L (ref 23–29)
CREAT SERPL-MCNC: 14.5 MG/DL (ref 0.5–1.4)
DIFFERENTIAL METHOD BLD: ABNORMAL
EOSINOPHIL # BLD AUTO: 0.3 K/UL (ref 0–0.5)
EOSINOPHIL NFR BLD: 5.4 % (ref 0–8)
ERYTHROCYTE [DISTWIDTH] IN BLOOD BY AUTOMATED COUNT: 18.6 % (ref 11.5–14.5)
EST. GFR  (NO RACE VARIABLE): 3.9 ML/MIN/1.73 M^2
GLUCOSE SERPL-MCNC: 85 MG/DL (ref 70–110)
HCT VFR BLD AUTO: 27.5 % (ref 40–54)
HGB BLD-MCNC: 8.6 G/DL (ref 14–18)
IMM GRANULOCYTES # BLD AUTO: 0.01 K/UL (ref 0–0.04)
IMM GRANULOCYTES NFR BLD AUTO: 0.2 % (ref 0–0.5)
LYMPHOCYTES # BLD AUTO: 1.8 K/UL (ref 1–4.8)
LYMPHOCYTES NFR BLD: 31.1 % (ref 18–48)
MAGNESIUM SERPL-MCNC: 1.8 MG/DL (ref 1.6–2.6)
MCH RBC QN AUTO: 31.3 PG (ref 27–31)
MCHC RBC AUTO-ENTMCNC: 31.3 G/DL (ref 32–36)
MCV RBC AUTO: 100 FL (ref 82–98)
MONOCYTES # BLD AUTO: 0.8 K/UL (ref 0.3–1)
MONOCYTES NFR BLD: 13.7 % (ref 4–15)
NEUTROPHILS # BLD AUTO: 2.8 K/UL (ref 1.8–7.7)
NEUTROPHILS NFR BLD: 49.1 % (ref 38–73)
NRBC BLD-RTO: 0 /100 WBC
PHOSPHATE SERPL-MCNC: 5.9 MG/DL (ref 2.7–4.5)
PLATELET # BLD AUTO: 422 K/UL (ref 150–450)
PMV BLD AUTO: 9.3 FL (ref 9.2–12.9)
POTASSIUM SERPL-SCNC: 4.7 MMOL/L (ref 3.5–5.1)
RBC # BLD AUTO: 2.75 M/UL (ref 4.6–6.2)
SODIUM SERPL-SCNC: 137 MMOL/L (ref 136–145)
VANCOMYCIN SERPL-MCNC: 18.5 UG/ML
WBC # BLD AUTO: 5.78 K/UL (ref 3.9–12.7)

## 2024-10-28 PROCEDURE — 25000003 PHARM REV CODE 250

## 2024-10-28 PROCEDURE — 80069 RENAL FUNCTION PANEL: CPT | Performed by: INTERNAL MEDICINE

## 2024-10-28 PROCEDURE — 25000003 PHARM REV CODE 250: Performed by: INTERNAL MEDICINE

## 2024-10-28 PROCEDURE — 12000002 HC ACUTE/MED SURGE SEMI-PRIVATE ROOM

## 2024-10-28 PROCEDURE — 83735 ASSAY OF MAGNESIUM: CPT | Performed by: INTERNAL MEDICINE

## 2024-10-28 PROCEDURE — 99223 1ST HOSP IP/OBS HIGH 75: CPT | Mod: ,,, | Performed by: NURSE PRACTITIONER

## 2024-10-28 PROCEDURE — 63600175 PHARM REV CODE 636 W HCPCS: Mod: JZ,JG | Performed by: INTERNAL MEDICINE

## 2024-10-28 PROCEDURE — 85025 COMPLETE CBC W/AUTO DIFF WBC: CPT | Performed by: INTERNAL MEDICINE

## 2024-10-28 PROCEDURE — 90935 HEMODIALYSIS ONE EVALUATION: CPT

## 2024-10-28 PROCEDURE — 99221 1ST HOSP IP/OBS SF/LOW 40: CPT | Mod: ,,, | Performed by: FAMILY MEDICINE

## 2024-10-28 PROCEDURE — 63600175 PHARM REV CODE 636 W HCPCS: Performed by: INTERNAL MEDICINE

## 2024-10-28 PROCEDURE — 80202 ASSAY OF VANCOMYCIN: CPT | Performed by: FAMILY MEDICINE

## 2024-10-28 PROCEDURE — 5A1D70Z PERFORMANCE OF URINARY FILTRATION, INTERMITTENT, LESS THAN 6 HOURS PER DAY: ICD-10-PCS | Performed by: INTERNAL MEDICINE

## 2024-10-28 RX ORDER — HYDRALAZINE HYDROCHLORIDE 20 MG/ML
10 INJECTION INTRAMUSCULAR; INTRAVENOUS EVERY 6 HOURS PRN
Status: DISCONTINUED | OUTPATIENT
Start: 2024-10-28 | End: 2024-10-29 | Stop reason: HOSPADM

## 2024-10-28 RX ORDER — MUPIROCIN 20 MG/G
OINTMENT TOPICAL 2 TIMES DAILY
Status: DISCONTINUED | OUTPATIENT
Start: 2024-10-28 | End: 2024-10-29 | Stop reason: HOSPADM

## 2024-10-28 RX ORDER — VALSARTAN 80 MG/1
320 TABLET ORAL DAILY
Status: DISCONTINUED | OUTPATIENT
Start: 2024-10-28 | End: 2024-10-29

## 2024-10-28 RX ORDER — HEPARIN SODIUM 5000 [USP'U]/ML
5000 INJECTION, SOLUTION INTRAVENOUS; SUBCUTANEOUS EVERY 8 HOURS
Status: DISCONTINUED | OUTPATIENT
Start: 2024-10-28 | End: 2024-10-28

## 2024-10-28 RX ORDER — MEROPENEM 500 MG/1
500 INJECTION, POWDER, FOR SOLUTION INTRAVENOUS
Status: DISCONTINUED | OUTPATIENT
Start: 2024-10-29 | End: 2024-10-28

## 2024-10-28 RX ORDER — METOPROLOL SUCCINATE 50 MG/1
150 TABLET, EXTENDED RELEASE ORAL DAILY
Status: DISCONTINUED | OUTPATIENT
Start: 2024-10-28 | End: 2024-10-29 | Stop reason: HOSPADM

## 2024-10-28 RX ORDER — HYDRALAZINE HYDROCHLORIDE 25 MG/1
100 TABLET, FILM COATED ORAL EVERY 8 HOURS
Status: DISCONTINUED | OUTPATIENT
Start: 2024-10-28 | End: 2024-10-29 | Stop reason: HOSPADM

## 2024-10-28 RX ORDER — LIDOCAINE AND PRILOCAINE 25; 25 MG/G; MG/G
CREAM TOPICAL
Status: DISCONTINUED | OUTPATIENT
Start: 2024-10-28 | End: 2024-10-29 | Stop reason: HOSPADM

## 2024-10-28 RX ORDER — CIPROFLOXACIN 500 MG/1
500 TABLET ORAL NIGHTLY
Status: DISCONTINUED | OUTPATIENT
Start: 2024-10-28 | End: 2024-10-28

## 2024-10-28 RX ORDER — CIPROFLOXACIN 500 MG/1
500 TABLET ORAL NIGHTLY
Status: DISCONTINUED | OUTPATIENT
Start: 2024-10-29 | End: 2024-10-29 | Stop reason: HOSPADM

## 2024-10-28 RX ADMIN — HYDRALAZINE HYDROCHLORIDE 100 MG: 25 TABLET ORAL at 09:10

## 2024-10-28 RX ADMIN — VALSARTAN 320 MG: 80 TABLET, FILM COATED ORAL at 10:10

## 2024-10-28 RX ADMIN — EPOETIN ALFA-EPBX 10000 UNITS: 10000 INJECTION, SOLUTION INTRAVENOUS; SUBCUTANEOUS at 04:10

## 2024-10-28 RX ADMIN — METOPROLOL SUCCINATE 150 MG: 50 TABLET, FILM COATED, EXTENDED RELEASE ORAL at 10:10

## 2024-10-28 RX ADMIN — MUPIROCIN 1 G: 20 OINTMENT TOPICAL at 01:10

## 2024-10-28 RX ADMIN — LIDOCAINE AND PRILOCAINE: 25; 25 CREAM TOPICAL at 12:10

## 2024-10-28 RX ADMIN — MEROPENEM 1 G: 1 INJECTION, POWDER, FOR SOLUTION INTRAVENOUS at 10:10

## 2024-10-28 RX ADMIN — HYDRALAZINE HYDROCHLORIDE 100 MG: 25 TABLET ORAL at 05:10

## 2024-10-28 RX ADMIN — POLYETHYLENE GLYCOL 3350 17 G: 17 POWDER, FOR SOLUTION ORAL at 10:10

## 2024-10-28 NOTE — SUBJECTIVE & OBJECTIVE
No current facility-administered medications on file prior to encounter.     Current Outpatient Medications on File Prior to Encounter   Medication Sig    aluminum-magnesium hydroxide-simethicone (MAALOX) 200-200-20 mg/5 mL Susp Take 30 mLs by mouth 4 (four) times daily as needed.    calcitRIOL (ROCALTROL) 0.25 MCG Cap Take 1 capsule (0.25 mcg total) by mouth once daily.    calcium carbonate (TUMS) 200 mg calcium (500 mg) chewable tablet Take 2 tablets (1,000 mg total) by mouth 3 (three) times daily.    cholecalciferol, vitamin D3, 125 mcg (5,000 unit) Tab Take 5,000 Units by mouth every morning.    cloNIDine (CATAPRES) 0.3 MG tablet Take 1 tablet (0.3 mg total) by mouth 3 (three) times daily as needed (SBP > 150).    epoetin mily-epbx (RETACRIT) 20,000 unit/mL injection Inject 0.67 mLs (13,400 Units total) into the skin every Mon, Wed, Fri.    hydrALAZINE (APRESOLINE) 100 MG tablet Take 1 tablet (100 mg total) by mouth every 8 (eight) hours. Hold for SBP < 120 and before dialysis    metoprolol succinate (TOPROL-XL) 50 MG 24 hr tablet Take 150 mg by mouth once daily.    olmesartan (BENICAR) 40 MG tablet Take 1 tablet (40 mg total) by mouth once daily.    oxyCODONE (ROXICODONE) 20 mg Tab immediate release tablet Take 1 tablet by mouth every 12 (twelve) hours.    sevelamer carbonate (RENVELA) 800 mg Tab Take 2 tablets (1,600 mg total) by mouth 3 (three) times daily with meals.    acetaminophen (TYLENOL) 325 MG tablet Take 2 tablets (650 mg total) by mouth every 4 (four) hours as needed. (Patient not taking: Reported on 10/25/2024)    ALPRAZolam (XANAX) 0.25 MG tablet Take 1 tablet (0.25 mg total) by mouth nightly as needed for Insomnia. (Patient not taking: Reported on 10/25/2024)    apixaban (ELIQUIS) 2.5 mg Tab Take 2.5 mg by mouth 2 (two) times daily.    gabapentin (NEURONTIN) 100 MG capsule Take 1 capsule (100 mg total) by mouth 3 (three) times daily. (Patient not taking: Reported on 10/25/2024)    heparin  sodium,porcine (HEPARIN, PORCINE,) 1,000 unit/mL injection Inject 4 mLs (4,000 Units total) into the vein as needed (line care after dialysis).    heparin sodium,porcine (HEPARIN, PORCINE,) 5,000 unit/mL injection Inject 1 mL (5,000 Units total) into the skin every 8 (eight) hours.    HYDROmorphone (DILAUDID) 4 MG tablet Take 1 tablet (4 mg total) by mouth every 4 (four) hours as needed for Pain. (Patient not taking: Reported on 10/25/2024)    insulin aspart U-100 (NOVOLOG) 100 unit/mL (3 mL) InPn pen Inject 0-5 Units into the skin before meals and at bedtime as needed (Hyperglycemia). (Patient not taking: Reported on 10/25/2024)    isosorbide mononitrate (IMDUR) 120 MG 24 hr tablet Take 120 mg by mouth every morning.    isosorbide mononitrate (IMDUR) 30 MG 24 hr tablet Take 1 tablet (30 mg total) by mouth once daily.    ketoconazole (NIZORAL) 2 % shampoo Apply topically every other day. (Patient not taking: Reported on 10/25/2024)    LIDOcaine-prilocaine (EMLA) cream Apply topically as needed (pre dialysis).    melatonin (MELATIN) 3 mg tablet Take 2 tablets (6 mg total) by mouth nightly as needed for Insomnia. (Patient not taking: Reported on 10/25/2024)    miconazole (MICOTIN) 2 % cream Apply topically 2 (two) times daily. (Patient not taking: Reported on 10/25/2024)    ondansetron (ZOFRAN-ODT) 4 MG TbDL Take 1 tablet (4 mg total) by mouth every 6 (six) hours as needed (nausea). (Patient not taking: Reported on 10/25/2024)    oxymetazoline (AFRIN) 0.05 % nasal spray 1 spray by Nasal route 2 (two) times daily. (Patient not taking: Reported on 10/25/2024)    simethicone (MYLICON) 80 MG chewable tablet Take 1 tablet (80 mg total) by mouth 3 (three) times daily as needed for Flatulence.       Review of patient's allergies indicates:   Allergen Reactions    Mushroom Swelling     Tongue swells    Tongue swells       Tongue swells       Past Medical History:   Diagnosis Date    Allergy     Anemia     Anxiety     CHF  (congestive heart failure)     Depression     High blood pressure with chronic kidney disease, stage 5 chronic kidney disease or end stage renal disease     Hypertension      Past Surgical History:   Procedure Laterality Date    ABSCESS DRAINAGE Left 9/17/2024    Procedure: DRAINAGE, ABSCESS, GROIN;  Surgeon: Galileo Connors MD;  Location: [facility] OR;  Service: General;  Laterality: Left;    COLON RESECTION  9/25/2024    Procedure: COLON RESECTION;  Surgeon: Galileo Connors MD;  Location: [facility] OR;  Service: General;;    FISTULOGRAM Bilateral 4/30/2024    Procedure: Fistulogram;  Surgeon: Khoobehi, Ali, MD;  Location: [facility] CATH/EP LAB;  Service: Vascular;  Laterality: Bilateral;    FISTULOGRAM Left 9/24/2024    Procedure: Fistulogram;  Surgeon: Lorraine Salvador MD;  Location: [facility] CATH/EP LAB;  Service: General;  Laterality: Left;    HERNIA REPAIR Right     With mesh    INSERTION, CATHETER, TUNNELED N/A 6/22/2023    Procedure: Insertion,catheter,tunneled;  Surgeon: Everett Caicedo MD;  Location: [facility] OR;  Service: General;  Laterality: N/A;    LAPAROTOMY, EXPLORATORY N/A 9/25/2024    Procedure: LAPAROTOMY, EXPLORATORY;  Surgeon: Galileo Connors MD;  Location: [facility];  Service: General;  Laterality: N/A;    MOBILIZATION OF SPLENIC FLEXURE  9/25/2024    Procedure: MOBILIZATION, SPLENIC FLEXURE;  Surgeon: Galileo Connors MD;  Location: [facility] OR;  Service: General;;    PERCUTANEOUS TRANSLUMINAL ANGIOPLASTY OF ARTERIOVENOUS FISTULA Left 4/30/2024    Procedure: PTA, AV FISTULA;  Surgeon: Khoobehi, Ali, MD;  Location: [facility] CATH/EP LAB;  Service: Vascular;  Laterality: Left;    PERCUTANEOUS TRANSLUMINAL ANGIOPLASTY OF ARTERIOVENOUS FISTULA Left 9/24/2024    Procedure: PTA, AV FISTULA;  Surgeon: Lorraine Salvador MD;  Location: [facility] CATH/EP LAB;  Service: General;  Laterality: Left;    PHLEBOGRAPHY N/A 7/19/2024    Procedure: Venogram;  Surgeon: Khoobehi, Ali, MD;  Location: [facility] CATH/EP LAB;  Service: Vascular;   Laterality: N/A;    REMOVAL, TUNNELED CATH Right 7/19/2024    Procedure: REMOVAL, TUNNELED CATH;  Surgeon: Khoobehi, Ali, MD;  Location: [facility] CATH/EP LAB;  Service: Vascular;  Laterality: Right;    ROBOT-ASSISTED LAPAROSCOPIC RESECTION OF SIGMOID COLON USING DA DELANO XI N/A 9/17/2024    Procedure: XI ROBOTIC SIGMOID RESECTION, WITH ANASTAMOSIS;  Surgeon: Galileo Connors MD;  Location: [facility] OR;  Service: General;  Laterality: N/A;  possible open have instruments available     Family History       Problem Relation (Age of Onset)    Hypertension Mother          Tobacco Use    Smoking status: Never     Passive exposure: Never    Smokeless tobacco: Never   Substance and Sexual Activity    Alcohol use: Never    Drug use: Never    Sexual activity: Not Currently     Review of Systems   Constitutional:  Negative for activity change and appetite change.   Gastrointestinal:  Negative for abdominal distention.     Objective:     Vital Signs (Most Recent):  Temp: 97.8 °F (36.6 °C) (10/28/24 0715)  Pulse: 86 (10/28/24 0715)  Resp: 18 (10/28/24 0715)  BP: (!) 175/103 (10/28/24 0715)  SpO2: 99 % (10/28/24 0715) Vital Signs (24h Range):  Temp:  [97.8 °F (36.6 °C)-99.4 °F (37.4 °C)] 97.8 °F (36.6 °C)  Pulse:  [] 86  Resp:  [15-18] 18  SpO2:  [95 %-99 %] 99 %  BP: (133-175)/() 175/103     Weight: 119.6 kg (263 lb 10.7 oz)  Body mass index is 33.85 kg/m².     Physical Exam  Vitals reviewed.   Pulmonary:      Effort: Pulmonary effort is normal.   Abdominal:      General: There is no distension.      Tenderness: There is no abdominal tenderness.      Hernia: No hernia is present.   Neurological:      Mental Status: He is alert.   Psychiatric:         Mood and Affect: Mood normal.            I have reviewed all pertinent lab results within the past 24 hours.  CBC:   Recent Labs   Lab 10/28/24  0441   WBC 5.78   RBC 2.75*   HGB 8.6*   HCT 27.5*      *   MCH 31.3*   MCHC 31.3*     BMP:   Recent Labs   Lab  10/28/24  0441   GLU 85      K 4.7   CL 99   CO2 28   BUN 30*   CREATININE 14.5*   CALCIUM 8.9   MG 1.8       Significant Diagnostics:  CT reviewed.  Interval decrease in size of fluid collections in the abdominal cavity

## 2024-10-28 NOTE — ASSESSMENT & PLAN NOTE
Patients blood pressure range in the last 24 hours was: BP  Min: 79/48  Max: 220/123.The patient's inpatient anti-hypertensive regimen is listed below:  Current Antihypertensives  , Every morning, Oral  metoprolol succinate (TOPROL-XL) 24 hr tablet 150 mg, Daily, Oral  valsartan tablet 320 mg, Daily, Oral  hydrALAZINE tablet 100 mg, Every 8 hours, Oral    Plan  - BP is controlled, no changes needed to their regimen

## 2024-10-28 NOTE — PROGRESS NOTES
Nephrology Progress Note        Patient Name: João Ham  MRN: [id number]    Patient Class: IP- Inpatient   Admission Date: 10/25/2024  Length of Stay: 0 days  Date of Service: 10/28/2024    Attending Physician: Narda Rivera MD  Primary Care Provider: Dawson Granado MD    Reason for Consult: esrd    SUBJECTIVE:     HPI: 44M with ESRD on HD MWF and recent admission for partial colectomy, complicated by failure of anastomosis with re-operation and colostomy + sepsis, sent by primary care provider to the emergency department for admission. Home health nurse noticed a change in the surgical wound and patient noticed more drainage. Patient's blood work is close to baseline. Patient denies any fever, chills, nausea vomiting. Patient however is very hypotensive and tachycardic and has slight pain at the incision site.     10/27 VSS, no new complains. BP better. Resume HD per schedule.  10/28  lab results reviewed.  Pressures high this am, has HD scheduled today, UF as tolerated.  No new complaints.    Review of Systems:  Constitutional:  Negative for chills, fever, malaise/fatigue and weight loss.   HENT:  Negative for hearing loss and nosebleeds.    Eyes:  Negative for blurred vision, double vision and photophobia.   Respiratory:  Negative for cough, shortness of breath and wheezing.    Cardiovascular:  Negative for chest pain, palpitations and leg swelling.   Gastrointestinal:  Negative for abdominal pain, constipation, diarrhea, heartburn, nausea and vomiting.   Genitourinary:  Negative for dysuria, frequency and urgency.   Musculoskeletal:  Negative for falls, joint pain and myalgias.   Skin:  Negative for itching and rash.   Neurological:  Negative for dizziness, speech change, focal weakness, loss of consciousness and headaches.   Endo/Heme/Allergies:  Does not bruise/bleed easily.   Psychiatric/Behavioral:  Negative for depression and substance abuse. The patient is not nervous/anxious.   "    ASSESSMENT/PLAN:     ESRD on HD MWF via AVF  Hyponatremia  Next dialysis per schedule or PRN.  Continue current dialysis prescription and adjust as needed.  Renal diet - low K, low phos as tolerated.  No IVs or BP checks on access and/or non-dominant arm.    Anemia of CKD  Hgb and HCT are acceptable. Monitor for now.  Will provide LOR and/or IV iron as needed to keep Hgb 8-10 range.    MBD / Secondary HPT  Ca, Phos, PTH and vitamin D levels are acceptable.   Phos binders, vitamin D and analogues, calcimimetics will be given as needed.    Hypotension is better  Tolerate asymptomatic HTN up to -160.  Continue home meds. DO NOT GIVE IVF.  Needs wound care management, not sure if he needs anymore IV abx.    Thank you for allowing us to participate in the care of your patient!   We will follow the patient and provide recommendations as needed.         Laboratory:  Recent Labs   Lab 10/26/24  0943 10/27/24  0836 10/28/24  0441   * 137 137   K 4.3 4.3 4.7   CL 95 96 99   CO2 28 28 28   BUN 16 24* 30*   CREATININE 9.3* 12.1* 14.5*   GLU 87 100 85       Recent Labs   Lab 10/26/24  0943 10/27/24  0836 10/28/24  0441   CALCIUM 8.5* 8.8 8.9   ALBUMIN 3.6 3.6 3.5   PHOS  --  4.9* 5.9*   MG 1.7 1.8 1.8       Recent Labs   Lab 02/02/23  0745 05/19/23  1022 06/19/23  1031   PTH, Intact 225.6 H 335.8 H 319.0 H       No results for input(s): "POCTGLUCOSE" in the last 168 hours.    Recent Labs   Lab 10/10/22  2005 06/22/23  0446 07/16/24  0510   Hemoglobin A1C 5.5 5.5 6.1       Recent Labs   Lab 10/26/24  0943 10/27/24  0836 10/28/24  0441   WBC 6.39 6.46 5.78   HGB 8.0* 8.5* 8.6*   HCT 26.2* 27.0* 27.5*    400 422   * 100* 100*   MCHC 30.5* 31.5* 31.3*   MONO 14.6  0.9 14.4  0.9 13.7  0.8   EOSINOPHIL 4.5 7.0 5.4       Recent Labs   Lab 10/25/24  1710 10/26/24  0943 10/27/24  0836 10/28/24  0441   BILITOT 0.5 0.4 0.4  --    PROT 7.7 7.0 7.2  --    ALBUMIN 3.8 3.6 3.6 3.5   ALKPHOS 87 78 77  --    ALT " 57* 43 36  --    AST 46* 34 25  --        Recent Labs   Lab 10/10/22  1645 01/09/23  1123 06/19/23  1622 07/17/24  0505 09/19/24  1435   Color, UA Yellow   < > Straw Yellow Yellow   Appearance, UA Clear   < > Clear Hazy A Clear   pH, UA 6.0   < > 6.0 6.0 8.0   Specific Gravity, UA 1.025   < > 1.010 1.020 1.010   Protein, UA 2+ A   < > 2+ A 3+ A 2+ A   Glucose, UA Negative   < > Negative Negative Negative   Ketones, UA Negative   < > Negative Negative Negative   Urobilinogen, UA Negative  --   --  Negative Negative   Bilirubin (UA) Negative   < > Negative Negative Negative   Occult Blood UA 1+ A   < > Negative 2+ A 2+ A   Nitrite, UA Negative   < > Negative Negative Negative   RBC, UA 1   < > 0 6 H 4   WBC, UA 0   < > 2 52 H 21 H   Bacteria None   < > None None None   Hyaline Casts, UA 0   < > 0 0 0    < > = values in this interval not displayed.       Recent Labs   Lab 09/25/24  1051 10/17/24  1923 10/25/24  1732   POC PH 7.404  --   --    POC PCO2 37.7  --   --    POC HCO3 23.6 L  --   --    POC PO2 87  --   --    POC SATURATED O2 97  --   --    POC BE -1  --   --    Sample ARTERIAL VENOUS VENOUS       Microbiology Results (last 7 days)       Procedure Component Value Units Date/Time    Aerobic culture [[id number]]  (Abnormal)  (Susceptibility) Collected: 10/25/24 1932    Order Status: Completed Specimen: Incision site from Abdomen Updated: 10/28/24 0640     Aerobic Bacterial Culture ESCHERICHIA COLI  Moderate        PSEUDOMONAS AERUGINOSA  Many      Blood culture x two cultures. Draw prior to antibiotics. [[id number]] Collected: 10/25/24 1748    Order Status: Completed Specimen: Blood from Peripheral, Antecubital, Right Updated: 10/27/24 2032     Blood Culture, Routine No Growth to date      No Growth to date      No Growth to date    Narrative:      Aerobic and anaerobic  Collection has been rescheduled by VITOI at 10/25/2024 16:42 Reason:   Nurse tolbert request to come back  Collection has been rescheduled by RICHARD  at 10/25/2024 17:19 Reason:   Patient has fistula must wait 15 mins  Collection has been rescheduled by BNI at 10/25/2024 16:42 Reason:   Nurse tomasz request to come back  Collection has been rescheduled by RICHARD at 10/25/2024 17:19 Reason:   Patient has fistula must wait 15 mins    Blood culture x two cultures. Draw prior to antibiotics. [[id number]] Collected: 10/25/24 1719    Order Status: Completed Specimen: Blood from Peripheral, Antecubital, Right Updated: 10/27/24 1832     Blood Culture, Routine No Growth to date      No Growth to date      No Growth to date    Narrative:      Aerobic and anaerobic  Collection has been rescheduled by VITOI at 10/25/2024 16:42 Reason:   Nurse tomasz request to come back  Collection has been rescheduled by BNI at 10/25/2024 16:42 Reason:   Nurse tomasz request to come back            Review of patient's allergies indicates:   Allergen Reactions    Mushroom Swelling     Tongue swells    Tongue swells       Tongue swells       Outpatient meds:  No current facility-administered medications on file prior to encounter.     Current Outpatient Medications on File Prior to Encounter   Medication Sig Dispense Refill    aluminum-magnesium hydroxide-simethicone (MAALOX) 200-200-20 mg/5 mL Susp Take 30 mLs by mouth 4 (four) times daily as needed.      calcitRIOL (ROCALTROL) 0.25 MCG Cap Take 1 capsule (0.25 mcg total) by mouth once daily.      calcium carbonate (TUMS) 200 mg calcium (500 mg) chewable tablet Take 2 tablets (1,000 mg total) by mouth 3 (three) times daily. 180 tablet 11    cholecalciferol, vitamin D3, 125 mcg (5,000 unit) Tab Take 5,000 Units by mouth every morning.      cloNIDine (CATAPRES) 0.3 MG tablet Take 1 tablet (0.3 mg total) by mouth 3 (three) times daily as needed (SBP > 150).      epoetin mily-epbx (RETACRIT) 20,000 unit/mL injection Inject 0.67 mLs (13,400 Units total) into the skin every Mon, Wed, Fri.      hydrALAZINE (APRESOLINE) 100 MG tablet Take 1 tablet (100  mg total) by mouth every 8 (eight) hours. Hold for SBP < 120 and before dialysis      metoprolol succinate (TOPROL-XL) 50 MG 24 hr tablet Take 150 mg by mouth once daily.      olmesartan (BENICAR) 40 MG tablet Take 1 tablet (40 mg total) by mouth once daily. 90 tablet 0    oxyCODONE (ROXICODONE) 20 mg Tab immediate release tablet Take 1 tablet by mouth every 12 (twelve) hours.      sevelamer carbonate (RENVELA) 800 mg Tab Take 2 tablets (1,600 mg total) by mouth 3 (three) times daily with meals. 180 tablet 11    acetaminophen (TYLENOL) 325 MG tablet Take 2 tablets (650 mg total) by mouth every 4 (four) hours as needed. (Patient not taking: Reported on 10/25/2024)      ALPRAZolam (XANAX) 0.25 MG tablet Take 1 tablet (0.25 mg total) by mouth nightly as needed for Insomnia. (Patient not taking: Reported on 10/25/2024)      apixaban (ELIQUIS) 2.5 mg Tab Take 2.5 mg by mouth 2 (two) times daily.      gabapentin (NEURONTIN) 100 MG capsule Take 1 capsule (100 mg total) by mouth 3 (three) times daily. (Patient not taking: Reported on 10/25/2024)      heparin sodium,porcine (HEPARIN, PORCINE,) 1,000 unit/mL injection Inject 4 mLs (4,000 Units total) into the vein as needed (line care after dialysis).      heparin sodium,porcine (HEPARIN, PORCINE,) 5,000 unit/mL injection Inject 1 mL (5,000 Units total) into the skin every 8 (eight) hours.      HYDROmorphone (DILAUDID) 4 MG tablet Take 1 tablet (4 mg total) by mouth every 4 (four) hours as needed for Pain. (Patient not taking: Reported on 10/25/2024)      insulin aspart U-100 (NOVOLOG) 100 unit/mL (3 mL) InPn pen Inject 0-5 Units into the skin before meals and at bedtime as needed (Hyperglycemia). (Patient not taking: Reported on 10/25/2024)      isosorbide mononitrate (IMDUR) 120 MG 24 hr tablet Take 120 mg by mouth every morning.      isosorbide mononitrate (IMDUR) 30 MG 24 hr tablet Take 1 tablet (30 mg total) by mouth once daily.      ketoconazole (NIZORAL) 2 % shampoo  Apply topically every other day. (Patient not taking: Reported on 10/25/2024)      LIDOcaine-prilocaine (EMLA) cream Apply topically as needed (pre dialysis).      melatonin (MELATIN) 3 mg tablet Take 2 tablets (6 mg total) by mouth nightly as needed for Insomnia. (Patient not taking: Reported on 10/25/2024)      miconazole (MICOTIN) 2 % cream Apply topically 2 (two) times daily. (Patient not taking: Reported on 10/25/2024)      ondansetron (ZOFRAN-ODT) 4 MG TbDL Take 1 tablet (4 mg total) by mouth every 6 (six) hours as needed (nausea). (Patient not taking: Reported on 10/25/2024)      oxymetazoline (AFRIN) 0.05 % nasal spray 1 spray by Nasal route 2 (two) times daily. (Patient not taking: Reported on 10/25/2024)      simethicone (MYLICON) 80 MG chewable tablet Take 1 tablet (80 mg total) by mouth 3 (three) times daily as needed for Flatulence.         Scheduled meds:   enoxparin  1 mg/kg Subcutaneous Daily    epoetin mily-epbx  10,000 Units Subcutaneous Every Mon, Wed, Fri    famotidine  20 mg Oral Daily    fluconazole (DIFLUCAN) IV (PEDS and ADULTS)  100 mg Intravenous Q24H    hydrALAZINE  100 mg Oral Q8H    [START ON 10/29/2024] meropenem IV (PEDS and ADULTS)  500 mg Intravenous Q24H    metoprolol succinate  150 mg Oral Daily    mupirocin   Nasal BID    polyethylene glycol  17 g Oral Daily    valsartan  320 mg Oral Daily       Infusions:      PRN meds:    Current Facility-Administered Medications:     acetaminophen, 650 mg, Oral, Q8H PRN    acetaminophen, 650 mg, Oral, Q4H PRN    aluminum-magnesium hydroxide-simethicone, 30 mL, Oral, QID PRN    HYDROcodone-acetaminophen, 1 tablet, Oral, Q6H PRN    LIDOcaine-prilocaine, , Topical (Top), PRN    melatonin, 6 mg, Oral, Nightly PRN    naloxone, 0.02 mg, Intravenous, PRN    ondansetron, 4 mg, Intravenous, Q6H PRN    Past Medical History:   Diagnosis Date    Allergy     Anemia     Anxiety     CHF (congestive heart failure)     Depression     High blood pressure with  chronic kidney disease, stage 5 chronic kidney disease or end stage renal disease     Hypertension      Past Surgical History:   Procedure Laterality Date    ABSCESS DRAINAGE Left 9/17/2024    Procedure: DRAINAGE, ABSCESS, GROIN;  Surgeon: Galileo Connors MD;  Location: [facility] OR;  Service: General;  Laterality: Left;    COLON RESECTION  9/25/2024    Procedure: COLON RESECTION;  Surgeon: Galileo Connors MD;  Location: [facility] OR;  Service: General;;    FISTULOGRAM Bilateral 4/30/2024    Procedure: Fistulogram;  Surgeon: Khoobehi, Ali, MD;  Location: [facility] CATH/EP LAB;  Service: Vascular;  Laterality: Bilateral;    FISTULOGRAM Left 9/24/2024    Procedure: Fistulogram;  Surgeon: Lorraine Salvador MD;  Location: [facility] CATH/EP LAB;  Service: General;  Laterality: Left;    HERNIA REPAIR Right     With mesh    INSERTION, CATHETER, TUNNELED N/A 6/22/2023    Procedure: Insertion,catheter,tunneled;  Surgeon: Everett Caicedo MD;  Location: [facility] OR;  Service: General;  Laterality: N/A;    LAPAROTOMY, EXPLORATORY N/A 9/25/2024    Procedure: LAPAROTOMY, EXPLORATORY;  Surgeon: Galileo Connors MD;  Location: [facility] OR;  Service: General;  Laterality: N/A;    MOBILIZATION OF SPLENIC FLEXURE  9/25/2024    Procedure: MOBILIZATION, SPLENIC FLEXURE;  Surgeon: Galileo Connors MD;  Location: [facility] OR;  Service: General;;    PERCUTANEOUS TRANSLUMINAL ANGIOPLASTY OF ARTERIOVENOUS FISTULA Left 4/30/2024    Procedure: PTA, AV FISTULA;  Surgeon: Khoobehi, Ali, MD;  Location: [facility] CATH/EP LAB;  Service: Vascular;  Laterality: Left;    PERCUTANEOUS TRANSLUMINAL ANGIOPLASTY OF ARTERIOVENOUS FISTULA Left 9/24/2024    Procedure: PTA, AV FISTULA;  Surgeon: Lorraine Salvador MD;  Location: [facility] CATH/EP LAB;  Service: General;  Laterality: Left;    PHLEBOGRAPHY N/A 7/19/2024    Procedure: Venogram;  Surgeon: Khoobehi, Ali, MD;  Location: [facility] CATH/EP LAB;  Service: Vascular;  Laterality: N/A;    REMOVAL, TUNNELED CATH Right 7/19/2024    Procedure:  REMOVAL, TUNNELED CATH;  Surgeon: Khoobehi, Ali, MD;  Location: [facility] CATH/EP LAB;  Service: Vascular;  Laterality: Right;    ROBOT-ASSISTED LAPAROSCOPIC RESECTION OF SIGMOID COLON USING DA DELANO XI N/A 9/17/2024    Procedure: XI ROBOTIC SIGMOID RESECTION, WITH ANASTAMOSIS;  Surgeon: Galileo Connors MD;  Location: [facility];  Service: General;  Laterality: N/A;  possible open have instruments available     Family History   Problem Relation Name Age of Onset    Hypertension Mother       Social History     Tobacco Use    Smoking status: Never     Passive exposure: Never    Smokeless tobacco: Never   Substance Use Topics    Alcohol use: Never    Drug use: Never       OBJECTIVE:     Vital Signs and IO:  Temp:  [97.8 °F (36.6 °C)-99.4 °F (37.4 °C)]   Pulse:  []   Resp:  [15-18]   BP: (133-175)/()   SpO2:  [95 %-99 %]   I/O last 3 completed shifts:  In: 340 [P.O.:240; IV Piggyback:100]  Out: 875 [Stool:875]  Wt Readings from Last 5 Encounters:   10/26/24 119.6 kg (263 lb 10.7 oz)   10/25/24 116.7 kg (257 lb 4.4 oz)   10/19/24 120.5 kg (265 lb 10.5 oz)   09/18/24 134 kg (295 lb 6.7 oz)   08/29/24 131.3 kg (289 lb 7.4 oz)     Body mass index is 33.85 kg/m².    Physical Exam  Constitutional:       General: Patient is not in acute distress.     Appearance: Patient is well-developed. She is not diaphoretic.   HENT:      Head: Normocephalic and atraumatic.      Mouth/Throat: Mucous membranes are moist.   Eyes:      General: No scleral icterus.     Pupils: Pupils are equal, round, and reactive to light.   Cardiovascular:      Rate and Rhythm: Normal rate and regular rhythm.   Pulmonary:      Effort: Pulmonary effort is normal. No respiratory distress.      Breath sounds: No stridor.   Abdominal:      General: There is no distension.      Palpations: Abdomen is soft.   Musculoskeletal:         General: No deformity. Normal range of motion.      Cervical back: Neck supple.   Skin:     General: Skin is warm and dry.       Findings: No rash present. No erythema.   Neurological:      Mental Status: Patient is alert and oriented to person, place, and time.      Cranial Nerves: No cranial nerve deficit.   Psychiatric:         Behavior: Behavior normal.          Patient care time was spent personally by me on the following activities:     Obtaining a history.  Examination of patient.  Providing medical care at the patients bedside.  Developing a treatment plan with patient or surrogate and bedside caregivers.  Ordering and reviewing laboratory studies, radiographic studies, pulse oximetry.  Ordering and performing treatments and interventions.  Evaluation of patient's response to treatment.  Discussions with consultants while on the unit and immediately available to the patient.  Re-evaluation of the patient's condition.  Documentation in the medical record.     Kellie Doty NP      [facility]  [address] [place] [address]  [place] [address]    [phone] - tel  [phone] - fax    10/28/2024

## 2024-10-28 NOTE — CONSULTS
[facility]  Wound Care    Patient Name:  João Ham   MRN:  [id number]  Date: 10/28/2024  Diagnosis: Postoperative wound breakdown    History:     Past Medical History:   Diagnosis Date    Allergy     Anemia     Anxiety     CHF (congestive heart failure)     Depression     High blood pressure with chronic kidney disease, stage 5 chronic kidney disease or end stage renal disease     Hypertension        Social History     Socioeconomic History    Marital status: Single   Tobacco Use    Smoking status: Never     Passive exposure: Never    Smokeless tobacco: Never   Substance and Sexual Activity    Alcohol use: Never    Drug use: Never    Sexual activity: Not Currently   Social History Narrative    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     Social Drivers of Health     Financial Resource Strain: Medium Risk (10/26/2024)    Overall Financial Resource Strain (CARDIA)     Difficulty of Paying Living Expenses: Somewhat hard   Food Insecurity: No Food Insecurity (10/26/2024)    Hunger Vital Sign     Worried About Running Out of Food in the Last Year: Never true     Ran Out of Food in the Last Year: Never true   Recent Concern: Food Insecurity - Food Insecurity Present (10/18/2024)    Hunger Vital Sign     Worried About Running Out of Food in the Last Year: Sometimes true     Ran Out of Food in the Last Year: Never true   Transportation Needs: No Transportation Needs (10/26/2024)    TRANSPORTATION NEEDS     Transportation : No   Physical Activity: Inactive (10/26/2024)    Exercise Vital Sign     Days of Exercise per Week: 0 days     Minutes of Exercise per Session: 0 min   Stress: No Stress Concern Present (10/26/2024)    [employer] - Occupational Stress Questionnaire     Feeling of Stress : Not at all   Recent Concern: Stress - Stress Concern Present (10/18/2024)    [employer] - Occupational Stress Questionnaire     Feeling of Stress : To some extent   Housing  Stability: Low Risk  (10/26/2024)    Housing Stability Vital Sign     Unable to Pay for Housing in the Last Year: No     Homeless in the Last Year: No       Precautions:     Allergies as of 10/25/2024 - Reviewed 10/25/2024   Allergen Reaction Noted    Mushroom Swelling 12/13/2022       [facility] Assessment Details/Treatment     Narrative  Wound care consulted for abdominal wound.  Pt seen by Dr. Recinos.  Bilateral heels intact with no redness or bogginess.  Open abdominal wound at distal end of midline abdominal incision.  Red, granulation tissue noted.  Wound measures 5.0 x 2.5 x 5.0, with tunneling @ 12 o'clock with 4.4cm depth.  Cleaned and packed wound with Aquacel Ag.  Covered with gauze /tape.  Left groin incision noted with mild red, hypergranulation tissue at medial end of incision.  Wound measures 0.2 x 1.0 x 2.0.  packed wound with Aquacel Ag.  Small lateral wound to incision at lateral end with red, granulation tissue.  Measures 0.2 x 1.0 x 0.1--applied Aquacel Ag, covered with gauze.  Colostomy pouch intact.  Liquid green stool noted.      Flowsheet   10/28/24 1205   WOCN Assessment   WOCN Total Time (mins) 60   Visit Date 10/28/24   Visit Time 1205   Consult Type New   WOCN Speciality Wound;Ostomy   WOCN List colostomy   Wound surgical   Number of Wounds 3   Ostomy Type Colostomy   Intervention assessed;changed;applied;chart review;orders   Teaching on-going        Wound 09/25/24 1521 Incision Left anterior Groin   Date First Assessed/Time First Assessed: 09/25/24 1521   Present on Original Admission: No  Primary Wound Type: Incision  Side: Left  Orientation: anterior  Location: Groin  Closure Method: (c) No Closure (see comments)   Dressing Appearance Intact;Moist drainage   Drainage Amount Small   Drainage Characteristics/Odor Purulent;Serous;Tan   Appearance Red   Periwound Area Intact;Dry   Wound Edges Open   Care Cleansed with:;Wound cleanser   Dressing Silver;Hydrofiber;Gauze   Packing  hydrofiber/alginate   Packing Inserted  1   Dressing Change Due 10/30/24        Wound 10/26/24 0005 Incision medial;lower Lower quadrant   Date First Assessed/Time First Assessed: 10/26/24 0005   Present on Original Admission: Yes  Primary Wound Type: Incision  Orientation: medial;lower  Location: Lower quadrant  Additional Comments: (c)    Wound Image    Dressing Appearance Intact;Moist drainage   Drainage Amount Moderate   Drainage Characteristics/Odor Serous;Tan   Appearance Pink;Red;Moist;Granulating   Periwound Area Intact;Dry   Wound Edges Open   Care Cleansed with:;Wound cleanser   Dressing Applied;Silver;Hydrofiber;Gauze   Dressing Change Due 10/30/24        Colostomy 09/25/24 1200 LLQ   Placement Date/Time: 09/25/24 (c) 1200   Inserted by: MD  Location: LLQ   Stomal Appliance 1 piece;Intact;No Leakage   Site Assessment Clean;Intact   Stoma Function flatus;stool;thin liquid     Recommendations  Clean wounds with wound cleanser.  Pat dry.  Pack abdominal wound with Aquacel Ag.  Cover with gauze and tape.  Pack Left groin medial wound with aquacel ag.  Apply aquacel ag to rest of groin incision.  Cover all wounds with gauze and tape.  Change dressings   Daily and PRN soilage.    Colostomy care every 3 days and PRN leakage.    10/28/2024

## 2024-10-28 NOTE — ASSESSMENT & PLAN NOTE
Wound appears to be healing well.  Intra-abdominal fluid collections were decreasing in size.  Continue supportive care.  No further surgical intervention at the present time.  Okay to discharge to home when cleared by Medicine.  He will follow up with me in the office in 2-3 weeks

## 2024-10-28 NOTE — CONSULTS
Chief complaint:  Wound Check (Had colostomy sx back in September and d/c this week after 45 days. Home health came out yesterday and stated that his wound had a foul smell. )      HPI:  João Ham is a 44 y.o. male presenting with a midline abdomen surgical wound and a supra-pubic surgical wound. Pt known to me from [facility] and was transferred to [facility] for further care. Pt is ready to go home and will FU at the wound care clinic. No other complaints today    10/28/24  Pt seen at bedside with wound team for a re-admit to the hospital for midline abdomen and supra-pubis incision wounds. Wounds are stable. Pt was DC to home, and now re-admitted. No other new complaints today    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     Social Drivers of Health     Financial Resource Strain: Medium Risk (10/26/2024)    Overall Financial Resource Strain (CARDIA)     Difficulty of Paying Living Expenses: Somewhat hard   Food Insecurity: No Food Insecurity (10/26/2024)    Hunger Vital Sign     Worried About Running Out of Food in the Last Year: Never true     Ran Out of Food in the Last Year: Never true   Recent Concern: Food Insecurity - Food Insecurity Present (10/18/2024)    Hunger Vital Sign     Worried About Running Out of Food in the Last Year: Sometimes true     Ran Out of Food in the Last Year: Never true   Transportation Needs: No Transportation Needs  (10/26/2024)    TRANSPORTATION NEEDS     Transportation : No   Physical Activity: Inactive (10/26/2024)    Exercise Vital Sign     Days of Exercise per Week: 0 days     Minutes of Exercise per Session: 0 min   Stress: No Stress Concern Present (10/26/2024)    [employer] - Occupational Stress Questionnaire     Feeling of Stress : Not at all   Recent Concern: Stress - Stress Concern Present (10/18/2024)    [employer] - Occupational Stress Questionnaire     Feeling of Stress : To some extent   Housing Stability: Low Risk  (10/26/2024)    Housing Stability Vital Sign     Unable to Pay for Housing in the Last Year: No     Homeless in the Last Year: No       Past Surgical History:   Procedure Laterality Date    ABSCESS DRAINAGE Left 9/17/2024    Procedure: DRAINAGE, ABSCESS, GROIN;  Surgeon: Galileo Connors MD;  Location: [facility];  Service: General;  Laterality: Left;    COLON RESECTION  9/25/2024    Procedure: COLON RESECTION;  Surgeon: Galileo Connors MD;  Location: [facility] OR;  Service: General;;    FISTULOGRAM Bilateral 4/30/2024    Procedure: Fistulogram;  Surgeon: Khoobehi, Ali, MD;  Location: [facility] CATH/EP LAB;  Service: Vascular;  Laterality: Bilateral;    FISTULOGRAM Left 9/24/2024    Procedure: Fistulogram;  Surgeon: Lorraine Salvador MD;  Location: [facility] CATH/EP LAB;  Service: General;  Laterality: Left;    HERNIA REPAIR Right     With mesh    INSERTION, CATHETER, TUNNELED N/A 6/22/2023    Procedure: Insertion,catheter,tunneled;  Surgeon: Everett Caicedo MD;  Location: [facility] OR;  Service: General;  Laterality: N/A;    LAPAROTOMY, EXPLORATORY N/A 9/25/2024    Procedure: LAPAROTOMY, EXPLORATORY;  Surgeon: Galileo Connors MD;  Location: [facility] OR;  Service: General;  Laterality: N/A;    MOBILIZATION OF SPLENIC FLEXURE  9/25/2024    Procedure: MOBILIZATION, SPLENIC FLEXURE;  Surgeon: Galileo Connors MD;  Location: [facility];  Service: General;;     PERCUTANEOUS TRANSLUMINAL ANGIOPLASTY OF ARTERIOVENOUS FISTULA Left 4/30/2024    Procedure: PTA, AV FISTULA;  Surgeon: Khoobehi, Ali, MD;  Location: [facility] CATH/EP LAB;  Service: Vascular;  Laterality: Left;    PERCUTANEOUS TRANSLUMINAL ANGIOPLASTY OF ARTERIOVENOUS FISTULA Left 9/24/2024    Procedure: PTA, AV FISTULA;  Surgeon: Lorraine Salvador MD;  Location: [facility] CATH/EP LAB;  Service: General;  Laterality: Left;    PHLEBOGRAPHY N/A 7/19/2024    Procedure: Venogram;  Surgeon: Khoobehi, Ali, MD;  Location: [facility] CATH/EP LAB;  Service: Vascular;  Laterality: N/A;    REMOVAL, TUNNELED CATH Right 7/19/2024    Procedure: REMOVAL, TUNNELED CATH;  Surgeon: Khoobehi, Ali, MD;  Location: [facility] CATH/EP LAB;  Service: Vascular;  Laterality: Right;    ROBOT-ASSISTED LAPAROSCOPIC RESECTION OF SIGMOID COLON USING DA DELANO XI N/A 9/17/2024    Procedure: XI ROBOTIC SIGMOID RESECTION, WITH ANASTAMOSIS;  Surgeon: Galileo Connors MD;  Location: [facility] OR;  Service: General;  Laterality: N/A;  possible open have instruments available       Family History   Problem Relation Name Age of Onset    Hypertension Mother         Review of patient's allergies indicates:   Allergen Reactions    Mushroom Swelling     Tongue swells    Tongue swells       Tongue swells       No current facility-administered medications on file prior to encounter.     Current Outpatient Medications on File Prior to Encounter   Medication Sig Dispense Refill    aluminum-magnesium hydroxide-simethicone (MAALOX) 200-200-20 mg/5 mL Susp Take 30 mLs by mouth 4 (four) times daily as needed.      calcitRIOL (ROCALTROL) 0.25 MCG Cap Take 1 capsule (0.25 mcg total) by mouth once daily.      calcium carbonate (TUMS) 200 mg calcium (500 mg) chewable tablet Take 2 tablets (1,000 mg total) by mouth 3 (three) times daily. 180 tablet 11    cholecalciferol, vitamin D3, 125 mcg (5,000 unit) Tab Take 5,000 Units by mouth every morning.      cloNIDine (CATAPRES) 0.3 MG tablet Take 1  tablet (0.3 mg total) by mouth 3 (three) times daily as needed (SBP > 150).      epoetin mily-epbx (RETACRIT) 20,000 unit/mL injection Inject 0.67 mLs (13,400 Units total) into the skin every Mon, Wed, Fri.      hydrALAZINE (APRESOLINE) 100 MG tablet Take 1 tablet (100 mg total) by mouth every 8 (eight) hours. Hold for SBP < 120 and before dialysis      metoprolol succinate (TOPROL-XL) 50 MG 24 hr tablet Take 150 mg by mouth once daily.      olmesartan (BENICAR) 40 MG tablet Take 1 tablet (40 mg total) by mouth once daily. 90 tablet 0    oxyCODONE (ROXICODONE) 20 mg Tab immediate release tablet Take 1 tablet by mouth every 12 (twelve) hours.      sevelamer carbonate (RENVELA) 800 mg Tab Take 2 tablets (1,600 mg total) by mouth 3 (three) times daily with meals. 180 tablet 11    acetaminophen (TYLENOL) 325 MG tablet Take 2 tablets (650 mg total) by mouth every 4 (four) hours as needed. (Patient not taking: Reported on 10/25/2024)      ALPRAZolam (XANAX) 0.25 MG tablet Take 1 tablet (0.25 mg total) by mouth nightly as needed for Insomnia. (Patient not taking: Reported on 10/25/2024)      apixaban (ELIQUIS) 2.5 mg Tab Take 2.5 mg by mouth 2 (two) times daily.      gabapentin (NEURONTIN) 100 MG capsule Take 1 capsule (100 mg total) by mouth 3 (three) times daily. (Patient not taking: Reported on 10/25/2024)      heparin sodium,porcine (HEPARIN, PORCINE,) 1,000 unit/mL injection Inject 4 mLs (4,000 Units total) into the vein as needed (line care after dialysis).      heparin sodium,porcine (HEPARIN, PORCINE,) 5,000 unit/mL injection Inject 1 mL (5,000 Units total) into the skin every 8 (eight) hours.      HYDROmorphone (DILAUDID) 4 MG tablet Take 1 tablet (4 mg total) by mouth every 4 (four) hours as needed for Pain. (Patient not taking: Reported on 10/25/2024)      insulin aspart U-100 (NOVOLOG) 100 unit/mL (3 mL) InPn pen Inject 0-5 Units into the skin before meals and at bedtime as needed (Hyperglycemia). (Patient not  "taking: Reported on 10/25/2024)      isosorbide mononitrate (IMDUR) 120 MG 24 hr tablet Take 120 mg by mouth every morning.      isosorbide mononitrate (IMDUR) 30 MG 24 hr tablet Take 1 tablet (30 mg total) by mouth once daily.      ketoconazole (NIZORAL) 2 % shampoo Apply topically every other day. (Patient not taking: Reported on 10/25/2024)      LIDOcaine-prilocaine (EMLA) cream Apply topically as needed (pre dialysis).      melatonin (MELATIN) 3 mg tablet Take 2 tablets (6 mg total) by mouth nightly as needed for Insomnia. (Patient not taking: Reported on 10/25/2024)      miconazole (MICOTIN) 2 % cream Apply topically 2 (two) times daily. (Patient not taking: Reported on 10/25/2024)      ondansetron (ZOFRAN-ODT) 4 MG TbDL Take 1 tablet (4 mg total) by mouth every 6 (six) hours as needed (nausea). (Patient not taking: Reported on 10/25/2024)      oxymetazoline (AFRIN) 0.05 % nasal spray 1 spray by Nasal route 2 (two) times daily. (Patient not taking: Reported on 10/25/2024)      simethicone (MYLICON) 80 MG chewable tablet Take 1 tablet (80 mg total) by mouth 3 (three) times daily as needed for Flatulence.         ROS: As per HPI and below:  Pertinent items are noted in HPI.      Physical Exam:     Vitals:    10/27/24 2321 10/28/24 0443 10/28/24 0715 10/28/24 1139   BP: (!) 149/87 (!) 141/80 (!) 175/103 (!) 166/98   Pulse: 106 85 86 79   Resp: 17 15 18 18   Temp: 99.4 °F (37.4 °C) 99.1 °F (37.3 °C) 97.8 °F (36.6 °C) 98 °F (36.7 °C)   TempSrc:   Oral Oral   SpO2: 95% 99% 99% 100%   Weight:       Height:           BP  Min: 79/48  Max: 220/123  Temp  Av.7 °F (37.1 °C)  Min: 97 °F (36.1 °C)  Max: 103.5 °F (39.7 °C)  Pulse  Av.3  Min: 52  Max: 143  Resp  Av.7  Min: 9  Max: 33  SpO2  Av.8 %  Min: 89 %  Max: 100 %  Height  Av' 1.6" (186.9 cm)  Min: 5' 11" (180.3 cm)  Max: 6' 2" (188 cm)  Weight  Av.1 kg (282 lb 6.2 oz)  Min: 116.1 kg (256 lb)  Max: 137 kg (302 lb 0.5 oz)    Body mass index " is 33.85 kg/m².          General:             Well developed, well nourished, no apparent distress  HEENT:              NCAT, no JVD, mucous membranes moist, EOM intact  Cardiovascular:  Regular rate and rhythm, normal S1, normal S2, No murmurs, rubs, or gallops  Respiratory:        Normal breath sounds, no wheezes, no rales, no rhonchi  Abdomen:           Bowel sounds present, non tender, non distended, no masses, no hepatojugular reflux  Extremities:        No clubbing, no cyanosis, no edema  Vascular:            2+ b/l radial.  Peripheral pulses intact.  No carotid bruits.  Neurological:      No focal deficits  Skin:                   No obvious rashes or erythema, supra-pubic and abdominal surgical wounds                    Lab Results   Component Value Date    WBC 5.78 10/28/2024    HGB 8.6 (L) 10/28/2024    HCT 27.5 (L) 10/28/2024     (H) 10/28/2024     10/28/2024     Lab Results   Component Value Date    CHOL 141 10/11/2022     Lab Results   Component Value Date    HDL 39 (L) 10/11/2022     Lab Results   Component Value Date    LDLCALC 88.8 10/11/2022     Lab Results   Component Value Date    TRIG 66 10/11/2022     Lab Results   Component Value Date    CHOLHDL 27.7 10/11/2022     CMP  Recent Labs   Lab 10/28/24  0441   GLU 85   CALCIUM 8.9   ALBUMIN 3.5      K 4.7   CO2 28   CL 99   BUN 30*   CREATININE 14.5*      Lab Results   Component Value Date    TSH 1.699 10/10/2022       Assessment and Recommendations       Diagnoses:    Supra-pubic and abdomen surgical wounds    Plan:  AqAg to the suroa-pubic wound daily  AqAg packed into the abdomen surgical wound daily  FU in OP clinic on dc      Complexity:    moderate

## 2024-10-28 NOTE — ASSESSMENT & PLAN NOTE
Microbiology Results (last 7 days)       Procedure Component Value Units Date/Time    Aerobic culture [[id number]]  (Abnormal)  (Susceptibility) Collected: 10/25/24 1932    Order Status: Completed Specimen: Incision site from Abdomen Updated: 10/28/24 0640     Aerobic Bacterial Culture ESCHERICHIA COLI  Moderate        PSEUDOMONAS AERUGINOSA  Many      Blood culture x two cultures. Draw prior to antibiotics. [[id number]] Collected: 10/25/24 1748    Order Status: Completed Specimen: Blood from Peripheral, Antecubital, Right Updated: 10/27/24 2032     Blood Culture, Routine No Growth to date      No Growth to date      No Growth to date    Narrative:      Aerobic and anaerobic  Collection has been rescheduled by BNI at 10/25/2024 16:42 Reason:   Nurse tomasz request to come back  Collection has been rescheduled by [email or web address] at 10/25/2024 17:19 Reason:   Patient has fistula must wait 15 mins  Collection has been rescheduled by BNI at 10/25/2024 16:42 Reason:   Nurse tomasz request to come back  Collection has been rescheduled by RICHARD at 10/25/2024 17:19 Reason:   Patient has fistula must wait 15 mins    Blood culture x two cultures. Draw prior to antibiotics. [[id number]] Collected: 10/25/24 1719    Order Status: Completed Specimen: Blood from Peripheral, Antecubital, Right Updated: 10/27/24 1832     Blood Culture, Routine No Growth to date      No Growth to date      No Growth to date    Narrative:      Aerobic and anaerobic  Collection has been rescheduled by BNI at 10/25/2024 16:42 Reason:   Nurse tomasz request to come back  Collection has been rescheduled by BNI at 10/25/2024 16:42 Reason:   Nurse tomasz request to come back

## 2024-10-28 NOTE — PROGRESS NOTES
Pharmacokinetic Assessment Follow Up: IV Vancomycin    Vancomycin serum concentration assessment(s):    The random level was drawn correctly and can be used to guide therapy at this time. The measurement is within the desired definitive target range of 10 to 20 mcg/mL.    Vancomycin Regimen Plan:    Give Vancomycin 500 mg once and Re-dose when the random level is less than 25 mcg/mL, next level to be drawn at 0430 on 10/30    Drug levels (last 3 results):  Recent Labs   Lab Result Units 10/27/24  0836 10/28/24  0441   Vancomycin, Random ug/mL 21.1 18.5       Pharmacy will continue to follow and monitor vancomycin.    Please contact pharmacy at extension [phone] for questions regarding this assessment.    Thank you for the consult,   Kali Theodore       Patient brief summary:  João Ham is a 44 y.o. male initiated on antimicrobial therapy with IV Vancomycin for treatment of bacteremia    Drug Allergies:   Review of patient's allergies indicates:   Allergen Reactions    Mushroom Swelling     Tongue swells    Tongue swells       Tongue swells       Actual Body Weight:   119.6 kg    Renal Function:   Estimated Creatinine Clearance: 9 mL/min (A) (based on SCr of 14.5 mg/dL (H)).,     CBC (last 72 hours):  Recent Labs   Lab Result Units 10/25/24  0904 10/25/24  1710 10/26/24  0943 10/27/24  0836 10/28/24  0441   WBC K/uL 5.41 5.26 6.39 6.46 5.78   Hemoglobin g/dL 9.0* 8.5* 8.0* 8.5* 8.6*   Hematocrit % 28.4* 26.8* 26.2* 27.0* 27.5*   Platelets K/uL 398 376 380 400 422   Gran % % 52.4 60.6 58.7 55.5 49.1   Lymph % % 25.1 20.0 21.6 22.3 31.1   Mono % % 15.5* 15.4* 14.6 14.4 13.7   Eosinophil % % 6.1 3.2 4.5 7.0 5.4   Basophil % % 0.7 0.6 0.3 0.6 0.5   Differential Method  Automated Automated Automated Automated Automated       Metabolic Panel (last 72 hours):  Recent Labs   Lab Result Units 10/25/24  0904 10/25/24  1710 10/26/24  0943 10/27/24  0836 10/28/24  0441   Sodium mmol/L 139 133* 133* 137 137   Potassium mmol/L 4.8 3.8  4.3 4.3 4.7   Chloride mmol/L 96 93* 95 96 99   CO2 mmol/L 25 30* 28 28 28   Glucose mg/dL 101 115* 87 100 85   BUN mg/dL 30* 12 16 24* 30*   Creatinine mg/dL 14.1* 7.0* 9.3* 12.1* 14.5*   Albumin g/dL 3.3* 3.8 3.6 3.6 3.5   Total Bilirubin mg/dL 0.5 0.5 0.4 0.4  --    Alkaline Phosphatase U/L 102 87 78 77  --    AST U/L 55* 46* 34 25  --    ALT U/L 72* 57* 43 36  --    Magnesium mg/dL  --   --  1.7 1.8 1.8   Phosphorus mg/dL  --   --   --  4.9* 5.9*       Vancomycin Administrations:  vancomycin given in the last 96 hours                     vancomycin (VANCOCIN) 2,000 mg in D5W 500 mL IVPB (mg) 2,000 mg New Bag 10/25/24 2693                    Microbiologic Results:  Microbiology Results (last 7 days)       Procedure Component Value Units Date/Time    Blood culture x two cultures. Draw prior to antibiotics. [[id number]] Collected: 10/25/24 1748    Order Status: Completed Specimen: Blood from Peripheral, Antecubital, Right Updated: 10/27/24 2032     Blood Culture, Routine No Growth to date      No Growth to date      No Growth to date    Narrative:      Aerobic and anaerobic  Collection has been rescheduled by BNI at 10/25/2024 16:42 Reason:   Nurse tomasz request to come back  Collection has been rescheduled by [email or web address] at 10/25/2024 17:19 Reason:   Patient has fistula must wait 15 mins  Collection has been rescheduled by BNI at 10/25/2024 16:42 Reason:   Nurse tomasz request to come back  Collection has been rescheduled by [email or web address] at 10/25/2024 17:19 Reason:   Patient has fistula must wait 15 mins    Blood culture x two cultures. Draw prior to antibiotics. [[id number]] Collected: 10/25/24 1719    Order Status: Completed Specimen: Blood from Peripheral, Antecubital, Right Updated: 10/27/24 1832     Blood Culture, Routine No Growth to date      No Growth to date      No Growth to date    Narrative:      Aerobic and anaerobic  Collection has been rescheduled by BNI at 10/25/2024 16:42 Reason:   Nurse tolbert request to come  back  Collection has been rescheduled by BNI at 10/25/2024 16:42 Reason:   Nurse tomasz request to come back    Aerobic culture [[id number]]  (Abnormal) Collected: 10/25/24 1932    Order Status: Completed Specimen: Incision site from Abdomen Updated: 10/27/24 0701     Aerobic Bacterial Culture GRAM NEGATIVE LASHAE, LACTOSE   Moderate  Identification and susceptibility pending        GRAM NEGATIVE LASHAE, NON-LACTOSE   Many  Identification and susceptibility pending

## 2024-10-28 NOTE — PROGRESS NOTES
Pharmacy Consult Discontinuation: Vancomycin    João Ham [id number] is a 44 y.o. male was consulted for vancomycin pharmacotherapy management by pharmacy.    Pharmacy consult for vancomycin dosing is no longer required.  Vancomycin was discontinued 10/28/2024.    Thank you for allowing us to participate in this patient's care. Should you have any questions or concerns please feel free to contact the pharmacy department at [phone].    [place], EdwinD

## 2024-10-28 NOTE — NURSING
Pt states his wound care has not been done since Thursday. He request wound care. I removed both dressings, see flowsheet, and cleaned with NS, packed abd wound with WTD dressing and applied dry gauze to groin wound. WC nurse is consulted.

## 2024-10-28 NOTE — SUBJECTIVE & OBJECTIVE
Interval History:   Seen and examined.  NAD.  Patient did go for dialysis on today.  General surgery consulted, for postop complications.  ID consulted for positive wound cultures.  Nephrology following.   Review of Systems   Constitutional:  Negative for activity change, appetite change, chills, fatigue and fever.   Respiratory: Negative.     Cardiovascular: Negative.    Gastrointestinal:  Positive for abdominal pain.   Genitourinary: Negative.    Musculoskeletal: Negative.    Skin:  Positive for wound.     Objective:     Vital Signs (Most Recent):  Temp: 97.8 °F (36.6 °C) (10/28/24 0715)  Pulse: 86 (10/28/24 0715)  Resp: 18 (10/28/24 0715)  BP: (!) 175/103 (10/28/24 0715)  SpO2: 99 % (10/28/24 0715) Vital Signs (24h Range):  Temp:  [97.8 °F (36.6 °C)-99.4 °F (37.4 °C)] 97.8 °F (36.6 °C)  Pulse:  [] 86  Resp:  [15-18] 18  SpO2:  [95 %-99 %] 99 %  BP: (133-175)/() 175/103     Weight: 119.6 kg (263 lb 10.7 oz)  Body mass index is 33.85 kg/m².    Intake/Output Summary (Last 24 hours) at 10/28/2024 1018  Last data filed at 10/28/2024 0640  Gross per 24 hour   Intake 290 ml   Output 475 ml   Net -185 ml         Physical Exam  Vitals and nursing note reviewed. Exam conducted with a chaperone present.   Constitutional:       Appearance: Normal appearance. He is well-developed.   HENT:      Nose: No septal deviation.   Neck:      Thyroid: No thyroid mass.      Vascular: No JVD.      Trachea: No tracheal tenderness or tracheal deviation.   Cardiovascular:      Rate and Rhythm: Normal rate.      Pulses: Normal pulses.      Heart sounds: S1 normal and S2 normal.   Pulmonary:      Effort: Pulmonary effort is normal.      Breath sounds: Normal breath sounds. No decreased breath sounds, wheezing, rhonchi or rales.   Abdominal:      General: Bowel sounds are normal.      Palpations: Abdomen is soft. There is no hepatomegaly or splenomegaly.      Tenderness: There is abdominal tenderness.      Comments: Colostomy  "present   Skin:     General: Skin is warm.      Findings: No rash.      Comments: Lower abd wound   Neurological:      General: No focal deficit present.      Mental Status: He is alert and oriented to person, place, and time.   Psychiatric:         Mood and Affect: Affect is flat.             Significant Labs: All pertinent labs within the past 24 hours have been reviewed.  Blood Culture: No results for input(s): "LABBLOO" in the last 48 hours.  CBC:   Recent Labs   Lab 10/27/24  0836 10/28/24  0441   WBC 6.46 5.78   HGB 8.5* 8.6*   HCT 27.0* 27.5*    422     CMP:   Recent Labs   Lab 10/27/24  0836 10/28/24  0441    137   K 4.3 4.7   CL 96 99   CO2 28 28    85   BUN 24* 30*   CREATININE 12.1* 14.5*   CALCIUM 8.8 8.9   PROT 7.2  --    ALBUMIN 3.6 3.5   BILITOT 0.4  --    ALKPHOS 77  --    AST 25  --    ALT 36  --    ANIONGAP 13 10       Magnesium:   Recent Labs   Lab 10/27/24  0836 10/28/24  0441   MG 1.8 1.8     Microbiology Results (last 7 days)       Procedure Component Value Units Date/Time    Aerobic culture [[id number]]  (Abnormal)  (Susceptibility) Collected: 10/25/24 1932    Order Status: Completed Specimen: Incision site from Abdomen Updated: 10/28/24 0640     Aerobic Bacterial Culture ESCHERICHIA COLI  Moderate        PSEUDOMONAS AERUGINOSA  Many      Blood culture x two cultures. Draw prior to antibiotics. [[id number]] Collected: 10/25/24 1748    Order Status: Completed Specimen: Blood from Peripheral, Antecubital, Right Updated: 10/27/24 2032     Blood Culture, Routine No Growth to date      No Growth to date      No Growth to date    Narrative:      Aerobic and anaerobic  Collection has been rescheduled by BNI at 10/25/2024 16:42 Reason:   Nurse tomasz request to come back  Collection has been rescheduled by RICHARD at 10/25/2024 17:19 Reason:   Patient has fistula must wait 15 mins  Collection has been rescheduled by BNI at 10/25/2024 16:42 Reason:   Nurse tomasz request to come " back  Collection has been rescheduled by RICHARD at 10/25/2024 17:19 Reason:   Patient has fistula must wait 15 mins    Blood culture x two cultures. Draw prior to antibiotics. [[id number]] Collected: 10/25/24 1719    Order Status: Completed Specimen: Blood from Peripheral, Antecubital, Right Updated: 10/27/24 1832     Blood Culture, Routine No Growth to date      No Growth to date      No Growth to date    Narrative:      Aerobic and anaerobic  Collection has been rescheduled by BNI at 10/25/2024 16:42 Reason:   Nurse tomasz request to come back  Collection has been rescheduled by BNI at 10/25/2024 16:42 Reason:   Nurse tomasz request to come back             Significant Imaging: I have reviewed all pertinent imaging results/findings within the past 24 hours.  Imaging Results              CT Abdomen Pelvis  Without Contrast (Final result)  Result time 10/25/24 22:11:31      Final result by Brant Prieto MD (10/25/24 22:11:31)                   Impression:      Postsurgical changes of exploratory laparotomy at the midline with mild associated fluid and edema. Interval dehiscence or intervention at the laparotomy site. Correlate for recent intervention.  Postsurgical changes of colon with colostomy in the mid abdomen, left of midline. Mild infectious/inflammatory edema surrounding colostomy, similar to prior.  Previously noted ill-defined intraperitoneal/mesenteric fluid collection adjacent has decreased in size, now 28 x 22 mm (previously 51 x 48 mm). Slight decrease adjacent infectious/inflammatory edema throughout the mesentery.    Postsurgical changes in the left inguinal canal with associated infectious/inflammatory fluid and edema and questionable small abscess. Overall, findings are slightly improved since prior.    Bladder wall thickening, although collapsed with mild adjacent fat stranding. Correlate for infectious/inflammatory cystitis.    Other findings above.      Electronically signed by: Brant  Ida  Date:    10/25/2024  Time:    22:11               Narrative:    EXAMINATION:  CT ABDOMEN PELVIS WITHOUT CONTRAST    CLINICAL HISTORY:  Abdominal abscess/infection suspected;    TECHNIQUE:  Low dose axial images, sagittal and coronal reformations were obtained from the lung bases to the pubic symphysis.  Oral contrast was not administered.    COMPARISON:  10/18/2024    FINDINGS:  Bilateral gynecomastia.    Lung bases are clear.    Unremarkable liver.  Cholelithiasis.  Unremarkable spleen and pancreas.    Kidneys are small and atrophic.  Left renal cyst.    Postsurgical changes of exploratory laparotomy at the midline with mild associated fluid and edema.  Interval dehiscence or intervention at the laparotomy site.  Correlate for recent intervention.  Postsurgical changes of colon with colostomy in the mid abdomen, left of midline.  Mild infectious/inflammatory edema surrounding colostomy, similar to prior.  Previously noted ill-defined intraperitoneal/mesenteric fluid collection adjacent has decreased in size, now 28 x 22 mm (previously 51 x 48 mm).  Slight decrease adjacent infectious/inflammatory edema throughout the mesentery.    Postsurgical changes in the left inguinal canal with associated infectious/inflammatory fluid and edema and questionable small abscess.  Overall, findings are slightly improved since prior.    Bladder wall thickening, although collapsed with mild adjacent fat stranding. Correlate for infectious/inflammatory cystitis.    Colonic diverticulosis, without diverticulitis.  Mild thickening of small-bowel loops, likely reactive.  No small bowel obstruction.    Nonspecific bilateral inguinal lymphadenopathy.    Bones are intact.                                       X-Ray Chest AP Portable (Final result)  Result time 10/25/24 17:44:09      Final result by Brant Prieto MD (10/25/24 17:44:09)                   Impression:      No acute findings.      Electronically signed  by: Brant Prieto  Date:    10/25/2024  Time:    17:44               Narrative:    EXAMINATION:  XR CHEST AP PORTABLE    CLINICAL HISTORY:  Sepsis;    TECHNIQUE:  Single frontal view of the chest was performed.    COMPARISON:  10/17/2024    FINDINGS:  Lungs are clear. No focal consolidation. No pleural effusion. No pneumothorax. Normal heart size.

## 2024-10-28 NOTE — PROGRESS NOTES
10/28/24 1645   Handoff Report   Received From Mary Carmen TORRES RN   Given To Lake BAPTISTE RN   Treatment Type   Treatment Type Maintenance   Vital Signs   Temp 98.7 °F (37.1 °C)   Temp Source Oral   Pulse 67   Heart Rate Source Monitor   Resp 18   SpO2 99 %   Device (Oxygen Therapy) room air   BP (!) 169/96   BP Method Automatic   Patient Position Lying   Assessments (Pre/Post)   Blood Liters Processed (BLP) 57.9   Transport Modality bed   Level of Consciousness (AVPU) alert   Dialyzer Clearance moderately streaked   Pain   Pain Rating (0-10): Rest 0        Hemodialysis AV Fistula Left forearm   No placement date or time found.   Present Prior to Hospital Arrival?: Yes  Size/Length: 17 G  Location: Left forearm   Needle Size 16ga   Site Assessment Clean;Dry;Intact;No redness;No swelling   Patency Present;Thrill;Bruit   Status Deaccessed   Flows Good   Dressing Intervention First dressing   Dressing Status Clean;Dry;Intact   Site Condition No complications   Dressing Gauze   Post-Hemodialysis Assessment   Rinseback Volume (mL) 250 mL   Blood Volume Processed (Liters) 57.9 L   Dialyzer Clearance Moderately streaked   Duration of Treatment 180 minutes   Additional Fluid Intake (mL) 500 mL   Total UF (mL) 3500 mL   Net Fluid Removal 3000   Patient Response to Treatment pt miya tx well   Post-Treatment Weight 120.9 kg (266 lb 8.6 oz)   Treatment Weight Change -3   Arterial bleeding stop time (min) 6 min   Venous bleeding stop time (min) 6 min   Post-Hemodialysis Comments pt stable   Edema   Edema generalized     Pt miya tx well, 3000 ml net uf removed

## 2024-10-28 NOTE — HPI
44-year-old gentleman whom I am familiar with.  He was status post exploratory laparotomy with a end-colostomy on September 25th.  He was admitted to the hospital October 25th secondary to wound drainage.  He had CT scan performed at that time which showed interval decrease in size of abdominal fluid collections.  I am seeing the patient today for the 1st time this admission.  He denies any pain.  He was eating regular diet.  No fevers or chills.  Wounds with no significant drainage

## 2024-10-28 NOTE — PROGRESS NOTES
Pharmacist Renal Dose Adjustment Note    João Ham is a 44 y.o. male being treated with the medication Meropenem    Patient Data:    Vital Signs (Most Recent):  Temp: 97.8 °F (36.6 °C) (10/28/24 0715)  Pulse: 86 (10/28/24 0715)  Resp: 18 (10/28/24 0715)  BP: (!) 175/103 (10/28/24 0715)  SpO2: 99 % (10/28/24 0715) Vital Signs (72h Range):  Temp:  [97.8 °F (36.6 °C)-99.4 °F (37.4 °C)]   Pulse:  []   Resp:  [12-23]   BP: ()/()   SpO2:  [91 %-100 %]      Recent Labs   Lab 10/26/24  0943 10/27/24  0836 10/28/24  0441   CREATININE 9.3* 12.1* 14.5*     Serum creatinine: 14.5 mg/dL (H) 10/28/24 0441  Estimated creatinine clearance: 9 mL/min (A)    Meropenem 1g Q12H will be changed to 500 mg Q24H (non-standard times - dosed after HD).    Pharmacist's Name: Meek Atwood  Pharmacist's Extension: [id number]

## 2024-10-28 NOTE — CONSULTS
[facility]   Department of Infectious Disease  Consult Note        PATIENT NAME: João Ham  MRN: [id number]  TODAY'S DATE: 10/28/2024  ADMIT DATE: 10/25/2024  LENGTH OF STAY: 0 DAYS      CHIEF COMPLAINT: Wound Check (Had colostomy sx back in September and d/c this week after 45 days. Home health came out yesterday and stated that his wound had a foul smell. )    PRINCIPLE PROBLEM: Postoperative wound breakdown    REASON FOR CONSULT: + wound culture     ASSESSMENT and PLAN     1.  Wound infection S/P 9/17/24 drainage of the abdomen  for incarcerated hernia and abscess,9/25/24 open laparotomy with anastomotic disruption and colon resection and colostomy  and IR aspiration on 10/3 with negative cultures.   - 10/25/2024 aerobic cultures from abdominal incision site with E coli and Pseudomonas aeruginosa  - 09/25/2024 aerobic culture from  abdominal incision with E coli and anaerobic culture with.  Bacteroides distasonis  -Completed 2 weeks of fluconazole, Zosyn and daptomycin/linezolid on 10/09/2024  - 10/25 blood cultures x2 sets no growth to date    2. ESRD on HD MWF        RECOMMENDATIONS:  Stop meropenem  Stop fluconazole   Start ciprofloxacin 500 mg oral nightly starting tomorrow -  should be given after dialysis on hemodialysis day  Obtain 12 lead EKG for QTC monitoring last QTc 507 on admit  Continue wound care    D/W Dr Au    Thank you for this consult. Please send Epic secure chat with any questions.    Antibiotics (From admission, onward)      Start     Stop Route Frequency Ordered    10/29/24 2100  ciprofloxacin HCl tablet 500 mg         11/05/24 2059 Oral Nightly 10/28/24 1600    10/28/24 1145  mupirocin 2 % ointment         11/02/24 0859 Nasl 2 times daily 10/28/24 1044          Antifungals (From admission, onward)      None           Antivirals (From admission, onward)      None            HPI      João Ham is a 44 y.o. male With chronic medical problems including ESRD with HD MWF,   anemia, obesity,  pulmonary hypertension, HFpEF and hypertension  who presented to the hospital on 10/25 sent by primary care provider because suspected wound infection.  He had previously been discharged from a long hospitalization 9/17 through 10/22  originally with abdominal pain  and left groin swelling  with a gangrenous incarcerated inguinal hernia and found to have colonic perforation due to mesh erosion with an abscess.  He underwent a sigmoid resection on 9/17/24 with drainage of the abdomen and was initially on clindamycin, vancomycin and cefepime.  He was seen by infectious disease and started on fluconazole and Zosyn.  He had decompensation of condition  and was taken back to surgery with an open laparotomy with anastomotic disruption and colon resection and colostomy placed on 9/25/24.  He had some respiratory issues and was placed on BiPAP at night.   He had issues with AV fistula and had a fistulogram on 09/24/2024 It continued fevers and had an IR on 10/3 aspiration with negative cultures.  He was initially discharged from the hospital on 10/6 and went to rehab.  He was in rehab from 10/06 through 10/17 and felt he had too many issues to go home so he went straight to the emergency room   he was ultimately discharged on  Tuesday October 22nd.  He returned to the hospital on 10/25 with concerns for intra-abdominal wound infection with increased drainage.  Patient went home on Tuesday and home health came out on Thursday and he said he has not changed dressing since he left the hospital.  He had problems changing colostomy bag and thinks he might have contaminated the wound with spillage.  He felt like he was discharged from the hospital too late in the day and home health had apparently tried to see him on the day he was discharged but he said he was too tired and they could not come back until Thursday.  He drove himself to the hospital.  He was ambulatory but weak and still gets a little dizzy when  he stands up.  He has abdominal pain around where the wound is.  He had some leakage of colostomy at home and needs some more assistance taking care of the colostomy bag.  He does not think he had a fever,  denies chills but was having sweats because his house was hot and there was no air conditioning.  He said he vomited once but he thinks it was because he took a Dulcolax.   He has a occasional nausea and a poor appetite because he did not like the hospital food.  He hardly ever makes urine.  He denies any mouth pain or ulcers, chest pain or palpitations.  He gets dyspnea on exertion and denies a cough.  He was last seen by infectious disease on 10/04 and it was recommended for him to have Zosyn, oral linezolid and oral fluconazole for 14 days from last surgery through 10/9.     CT abdomen and pelvis from 10/25 with postsurgical changes with mild associated fluid and edema interval dehiscence or laparotomy of the incision site, postsurgical changes of the colon with colostomy, mild infectious inflammatory edema surrounding colostomy similar to prior and previously noted ill-defined intraperitoneal mesenteric fluid collection adjacent decreased in now 28 x 22, previously 51 x 48 and slightly decreased adjacent infectious/ inflammatory edema throughout the mesentery.  Lab work with WBC 5.78, platelets 422, H&H 8.6/27.5, no left shift or bands, sed rate 110, BUN/CR 30/14.5, ALT/AST 36/25, CRP 16.8, procalcitonin 1.573, lactic acid  with no elevation.  A culture from abdominal incision with E coli and Pseudomonas aeruginosa. He was seen by General surgery who felt like intra-abdominal fluid collections were decreasing in size and no further surgical intervention was recommended.  T-max 99.4° in the last 24 hours.  He was originally given vancomycin x1 dose, then fluconazole and meropenem and today is day 4.    Outdoor activities: He lives with his nephew. No pets at home. He does not smoke or use any illicit drugs.    Travel: No recent travel.   Implants:  None  Antibiotic history:  See HPI    Social History  Marital Status: Single  Alcohol History:  reports no history of alcohol use.  Tobacco History:  reports that he has never smoked. He has never been exposed to tobacco smoke. He has never used smokeless tobacco.  Drug History:  reports no history of drug use.    Antibiotics   Vancomycin: 09/17/2024-09/20/2024   Clindamycin:  09/07/2020 04/09/2020 2/24   Cefepime: 09/07/2024-09/22/2024   Daptomycin: 09/22/2024-09/27/2024  Fluconazole: 09/22/2024-10/9/24  Zosyn: 09/22/2024-10/9/24  Linezolid: 10/2/2024-10/09/2024     Microbiology  Blood culture 09/07/2024:  negative  Urine culture 09/19/2024: Negative  Blood culture 09/24/2024:  Negative  Abdomen incision site culture 09/24/2024:  E coli in broth resistant to cefazolin, ampicillin, ampicillin sulbactam, ceftriaxone and tetracycline  09/25/2024 blood cultures x2 sets negative   09/25/2024 cath tip culture negative   09/25/2024 AFB and fungal cultures from abdominal incision negative  09/25/2024 aerobic culture from incision site with E coli from broth only resistant to ampicillin and cefazolin and intermediate to ampicillin/sulbactam  09/25/2024 anaerobic culture from incision site from abdomen with Bacteroides distasonis is beta lactamase positive  10/03/2024 anaerobic culture from body fluid from IR drainage from abdomen negative  10/03/2024 aerobic culture from body fluid from IR drainage from abdomen negative with Gram stain showing rare WBCs and no organisms  10/17/2024 blood cultures x2 sets negative  10/25/2024 blood cultures x2 sets no growth to date  10/25/2024 aerobic culture from incision site of abdomen with E coli  intermediate to ampicillin/sulbactam and resistant to ampicillin and cefazolin and Pseudomonas aeruginosa intermediate to cefepime    Review of patient's allergies indicates:   Allergen Reactions    Mushroom Swelling     Tongue swells    Tongue swells        Tongue swells       Past Medical History:   Diagnosis Date    Allergy     Anemia     Anxiety     CHF (congestive heart failure)     Depression     High blood pressure with chronic kidney disease, stage 5 chronic kidney disease or end stage renal disease     Hypertension      Past Surgical History:   Procedure Laterality Date    ABSCESS DRAINAGE Left 9/17/2024    Procedure: DRAINAGE, ABSCESS, GROIN;  Surgeon: Galileo Connors MD;  [place] [facility] [place];  Service: General;  Laterality: Left;    COLON RESECTION  9/25/2024    Procedure: COLON RESECTION;  Surgeon: Galileo Connors MD;  Location: [facility] OR;  Service: General;;    FISTULOGRAM Bilateral 4/30/2024    Procedure: Fistulogram;  Surgeon: Khoobehi, Ali, MD;  Location: [facility] CATH/EP LAB;  Service: Vascular;  Laterality: Bilateral;    FISTULOGRAM Left 9/24/2024    Procedure: Fistulogram;  Surgeon: Lorraine Salvador MD;  Location: [facility] CATH/EP LAB;  Service: General;  Laterality: Left;    HERNIA REPAIR Right     With mesh    INSERTION, CATHETER, TUNNELED N/A 6/22/2023    Procedure: Insertion,catheter,tunneled;  Surgeon: Everett Caicedo MD;  Location: [facility] OR;  Service: General;  Laterality: N/A;    LAPAROTOMY, EXPLORATORY N/A 9/25/2024    Procedure: LAPAROTOMY, EXPLORATORY;  Surgeon: Galileo Connors MD;  Location: [facility] OR;  Service: General;  Laterality: N/A;    MOBILIZATION OF SPLENIC FLEXURE  9/25/2024    Procedure: MOBILIZATION, SPLENIC FLEXURE;  Surgeon: Galileo Connors MD;  Location: [facility] OR;  Service: General;;    PERCUTANEOUS TRANSLUMINAL ANGIOPLASTY OF ARTERIOVENOUS FISTULA Left 4/30/2024    Procedure: PTA, AV FISTULA;  Surgeon: Khoobehi, Ali, MD;  Location: [facility] CATH/EP LAB;  Service: Vascular;  Laterality: Left;    PERCUTANEOUS TRANSLUMINAL ANGIOPLASTY OF ARTERIOVENOUS FISTULA Left 9/24/2024    Procedure: PTA, AV FISTULA;  Surgeon: Lorraine Salvador MD;  Location: [facility] CATH/EP LAB;  Service: General;  Laterality: Left;    PHLEBOGRAPHY N/A 7/19/2024     Procedure: Venogram;  Surgeon: Khoobehi, Ali, MD;  Location: [facility] CATH/EP LAB;  Service: Vascular;  Laterality: N/A;    REMOVAL, TUNNELED CATH Right 7/19/2024    Procedure: REMOVAL, TUNNELED CATH;  Surgeon: Khoobehi, Ali, MD;  Location: [facility] CATH/EP LAB;  Service: Vascular;  Laterality: Right;    ROBOT-ASSISTED LAPAROSCOPIC RESECTION OF SIGMOID COLON USING DA DELANO XI N/A 9/17/2024    Procedure: XI ROBOTIC SIGMOID RESECTION, WITH ANASTAMOSIS;  Surgeon: Galileo Connors MD;  Location: [facility] OR;  Service: General;  Laterality: N/A;  possible open have instruments available     Family History   Problem Relation Name Age of Onset    Hypertension Mother         SUBJECTIVE     Review of Systems  Constitutional:  Denies fevers, chills, + loss of appetite,+sweats at home because of the heat  HEENT: Denies visual changes, ear pain, sinus congestion, mouth pain or trouble swallowing, sore throat or dental pain.  Neck: Denies neck pain or lumps.  Respiratory: Denies coughing, wheezing or hemoptysis. + MARIN  Cardiovascular:  Denies chest pain, palpitations or edema.  Gastrointestinal: + nausea occ, Vomiting x1, + output from colostomy LLQ, + pain around wound  Genitourinary: + Oliguria, denies dysuria  Musculoskeletal:  Denies joint pain or swelling or difficulty walking.    Skin:  Denies rash or itching. + wounds to abd and left groin  Neurologic:  Denies motor or sensory loss, or headaches or + dizziness.    Psychiatric:  Denies changes in mood or behavior.    OBJECTIVE     Temp:  [97.8 °F (36.6 °C)-99.4 °F (37.4 °C)] 98 °F (36.7 °C)  Pulse:  [] 79  Resp:  [15-18] 18  SpO2:  [95 %-100 %] 100 %  BP: (141-175)/() 166/98  Temp:  [97.8 °F (36.6 °C)-99.4 °F (37.4 °C)]   Temp: 98 °F (36.7 °C) (10/28/24 1139)  Pulse: 79 (10/28/24 1139)  Resp: 18 (10/28/24 1139)  BP: (!) 166/98 (10/28/24 1139)  SpO2: 100 % (10/28/24 1139)    Intake/Output Summary (Last 24 hours) at 10/28/2024 1249  Last data filed at 10/28/2024 0640  Gross  per 24 hour   Intake 290 ml   Output 325 ml   Net -35 ml        Physical Exam  General: Obese, chronically ill-appearing adult male, lying in bed, awake and alert, good historian.  Eyes: Eyes with no icterus or injection. Vision grossly normal  Ears: Hearing grossly normal.  Nose: Nares patent  Mouth: Moist mucous membranes, dentition is good. No ulcerations, erythema or exudates.  Neck: Scars from previous lines in neck bilaterally.  Cardiovascular: Regular rate and rhythm, no murmurs, no edema.    Respiratory:  Clear to auscultation bilaterally, no tachypnea or increased work of breathing. On room air  Gastrointestinal:  Soft and obese with active bowel sounds, no distention. Colostomy with small amount of greenish brown stool.   Musculoskeletal:  Moves all extremities with equal strength.  Generally weak  Skin:  Warm and dry, no obvious rashes.  Wounds to abd and left groin - see photos below - they appear clean with no periwound erythema, no odor.  Examined while wound care was at the bedside changing dressing.  Neuro:   Oriented, conversant, follows commands.  Psych: Good mood, normal affect.  VAD: PIV left forearm  Isolation: None    Wounds  10/28/24      10/25/24            Significant Labs: All pertinent labs within the past 24 hours have been reviewed.    CBC LAST 7 DAYS  Recent Labs   Lab 10/21/24  1550 10/25/24  0904 10/25/24  1710 10/26/24  0943 10/27/24  0836 10/28/24  0441   WBC 6.24 5.41 5.26 6.39 6.46 5.78   RBC 2.57* 2.87* 2.71* 2.62* 2.70* 2.75*   HGB 8.1* 9.0* 8.5* 8.0* 8.5* 8.6*   HCT 25.9* 28.4* 26.8* 26.2* 27.0* 27.5*   * 99* 99* 100* 100* 100*   MCH 31.5* 31.4* 31.4* 30.5 31.5* 31.3*   MCHC 31.3* 31.7* 31.7* 30.5* 31.5* 31.3*   RDW 20.1* 19.4* 19.3* 19.1* 18.9* 18.6*    398 376 380 400 422   MPV 9.1* 9.0* 9.2 9.1* 8.7* 9.3   GRAN 56.6  3.5 52.4  2.8 60.6  3.2 58.7  3.8 55.5  3.6 49.1  2.8   LYMPH 22.3  1.4 25.1  1.4 20.0  1.1 21.6  1.4 22.3  1.4 31.1  1.8   MONO 13.3  " 0.8 15.5*  0.8 15.4*  0.8 14.6  0.9 14.4  0.9 13.7  0.8   BASO 0.02 0.04 0.03 0.02 0.04 0.03   NRBC 0 0 0 0 0 0       CHEMISTRY LAST 7 DAYS  Recent Labs   Lab 10/21/24  1550 10/25/24  0904 10/25/24  1710 10/26/24  0943 10/27/24  0836 10/28/24  0441   * 139 133* 133* 137 137   K 5.0 4.8 3.8 4.3 4.3 4.7   CL 99 96 93* 95 96 99   CO2 22* 25 30* 28 28 28   ANIONGAP 12 18* 10 10 13 10   BUN 39* 30* 12 16 24* 30*   CREATININE 14.3* 14.1* 7.0* 9.3* 12.1* 14.5*   GLU 98 101 115* 87 100 85   CALCIUM 9.7 10.0 8.4* 8.5* 8.8 8.9   MG 2.1  --   --  1.7 1.8 1.8   ALBUMIN 3.6 3.3* 3.8 3.6 3.6 3.5   PROT 7.7 8.6* 7.7 7.0 7.2  --    ALKPHOS 73 102 87 78 77  --    ALT 29 72* 57* 43 36  --    AST 28 55* 46* 34 25  --    BILITOT 0.4 0.5 0.5 0.4 0.4  --        Estimated Creatinine Clearance: 9 mL/min (A) (based on SCr of 14.5 mg/dL (H)).    INFLAMMATORY/PROCAL  LAST 7 DAYS  Recent Labs   Lab 10/25/24  0904 10/25/24  1710 10/26/24  0943   PROCAL  --  1.390* 1.573*   CRP 16.8*  --   --      No results found for: "ESR"  CRP   Date Value Ref Range Status   10/25/2024 16.8 (H) 0.0 - 8.2 mg/L Final   10/04/2024 17.00 (H) <1.00 mg/dL Final     Comment:     CRP-Normal Application expected values:   <1.0        mg/dL   Normal Range  1.0 - 5.0  mg/dL   Indicates mild inflammation  5.0 - 10.0 mg/dL   Indicates severe inflammation  >10.0        mg/dL   Represents serious processes and   frequently         indicates the presence of a bacterial   infection.      10/03/2024 20.00 (H) <1.00 mg/dL Final     Comment:     CRP-Normal Application expected values:   <1.0        mg/dL   Normal Range  1.0 - 5.0  mg/dL   Indicates mild inflammation  5.0 - 10.0 mg/dL   Indicates severe inflammation  >10.0        mg/dL   Represents serious processes and   frequently         indicates the presence of a bacterial   infection.      09/24/2024 35.70 (H) <1.00 mg/dL Final     Comment:     CRP-Normal Application expected values:   <1.0        mg/dL   " Normal Range  1.0 - 5.0  mg/dL   Indicates mild inflammation  5.0 - 10.0 mg/dL   Indicates severe inflammation  >10.0        mg/dL   Represents serious processes and   frequently         indicates the presence of a bacterial   infection.      09/20/2024 >16.00 (H) <1.00 mg/dL Final     Comment:     CRP-Normal Application expected values:   <1.0        mg/dL   Normal Range  1.0 - 5.0  mg/dL   Indicates mild inflammation  5.0 - 10.0 mg/dL   Indicates severe inflammation  >10.0        mg/dL   Represents serious processes and   frequently         indicates the presence of a bacterial   infection.      07/16/2024 >16.00 (H) <1.00 mg/dL Final     Comment:     CRP-Normal Application expected values:   <1.0        mg/dL   Normal Range  1.0 - 5.0  mg/dL   Indicates mild inflammation  5.0 - 10.0 mg/dL   Indicates severe inflammation  >10.0        mg/dL   Represents serious processes and   frequently         indicates the presence of a bacterial   infection.          PRIOR MICROBIOLOGY:  Susceptibility data from last 90 days.  Collected Specimen Info Organism Amp/Sulbactam Ampicillin Cefazolin Cefepime Ceftriaxone Ciprofloxacin Ertapenem Gentamicin Levofloxacin Meropenem PIPERACILLIN/TAZO SUSCEPTIBILITY Tetracycline Tobramycin Trimeth/Sulfa   10/25/24 Incision site from Abdomen Escherichia coli  I  R  R  S  S  S  S  S  S  S  S  S  S  S     Pseudomonas aeruginosa     I   S    S  S  S      10/17/24 Blood from Peripheral, Hand, Left No growth after 5 days.                 10/17/24 Blood from Peripheral, Antecubital, Left No growth after 5 days.                 09/25/24 Incision site from Abdomen Parabacteroides distasonis                 09/25/24 Incision site from Abdomen Escherichia coli  I  R  R  S  S  S  S  S  S  S  S  S  S  S   09/25/24 Blood from Peripheral, Antecubital, Right No growth after 5 days.                 09/25/24 Blood from Peripheral, Forearm, Right No growth after 5 days.                 09/24/24 Incision site  from Groin Escherichia coli  R  R  R  S  R  S  S  S  S  S  S  R  S  S   09/24/24 Blood No growth after 5 days.                 09/24/24 Blood No growth after 5 days.                 09/17/24 Blood from Peripheral, Antecubital, Right No growth after 5 days.                 09/17/24 Blood from Peripheral, Antecubital, Right No growth after 5 days.                     LAST 7 DAYS MICROBIOLOGY   Microbiology Results (last 7 days)       Procedure Component Value Units Date/Time    Aerobic culture [[id number]]  (Abnormal)  (Susceptibility) Collected: 10/25/24 1932    Order Status: Completed Specimen: Incision site from Abdomen Updated: 10/28/24 0640     Aerobic Bacterial Culture ESCHERICHIA COLI  Moderate        PSEUDOMONAS AERUGINOSA  Many      Blood culture x two cultures. Draw prior to antibiotics. [[id number]] Collected: 10/25/24 1748    Order Status: Completed Specimen: Blood from Peripheral, Antecubital, Right Updated: 10/27/24 2032     Blood Culture, Routine No Growth to date      No Growth to date      No Growth to date    Narrative:      Aerobic and anaerobic  Collection has been rescheduled by BNI at 10/25/2024 16:42 Reason:   Nurse tomasz request to come back  Collection has been rescheduled by [email or web address] at 10/25/2024 17:19 Reason:   Patient has fistula must wait 15 mins  Collection has been rescheduled by BNI at 10/25/2024 16:42 Reason:   Nurse tomasz request to come back  Collection has been rescheduled by [email or web address] at 10/25/2024 17:19 Reason:   Patient has fistula must wait 15 mins    Blood culture x two cultures. Draw prior to antibiotics. [[id number]] Collected: 10/25/24 1719    Order Status: Completed Specimen: Blood from Peripheral, Antecubital, Right Updated: 10/27/24 1832     Blood Culture, Routine No Growth to date      No Growth to date      No Growth to date    Narrative:      Aerobic and anaerobic  Collection has been rescheduled by BNI at 10/25/2024 16:42 Reason:   Nurse tomasz request to come  back  Collection has been rescheduled by BNI at 10/25/2024 16:42 Reason:   Nurse tomasz request to come back              CURRENT/PREVIOUS VISIT EKG  Results for orders placed or performed during the hospital encounter of 10/25/24   EKG 12-lead    Collection Time: 10/25/24  4:35 PM   Result Value Ref Range    QRS Duration 96 ms    [facility] QTC Calculation 507 ms    Narrative    Test Reason : R53.1,    Vent. Rate : 107 BPM     Atrial Rate : 107 BPM     P-R Int : 140 ms          QRS Dur : 096 ms      QT Int : 380 ms       P-R-T Axes : 050 -68 094 degrees     QTc Int : 507 ms    Sinus tachycardia  Left axis deviation  Incomplete right bundle branch block  Abnormal QRS-T angle, consider primary T wave abnormality  Abnormal ECG  When compared with ECG of 17-OCT-2024 17:12,  Incomplete right bundle branch block is now Present    Referred By: [email or web address]   SELF           Confirmed By:          Significant Imaging: I have reviewed all relevant and available imaging results/findings within the past 24 hours.    CT Abdomen Pelvis Without Contrast 10/25/24 2211   Bilateral gynecomastia.   Lung bases are clear.   Unremarkable liver.  Cholelithiasis.  Unremarkable spleen and pancreas.   Kidneys are small and atrophic.  Left renal cyst.   Postsurgical changes of exploratory laparotomy at the midline with mild associated fluid and edema.  Interval dehiscence or intervention at the laparotomy site.  Correlate for recent intervention.  Postsurgical changes of colon with colostomy in the mid abdomen, left of midline.  Mild infectious/inflammatory edema surrounding colostomy, similar to prior.  Previously noted ill-defined intraperitoneal/mesenteric fluid collection adjacent has decreased in size, now 28 x 22 mm (previously 51 x 48 mm).  Slight decrease adjacent infectious/inflammatory edema throughout the mesentery.   Postsurgical changes in the left inguinal canal with associated infectious/inflammatory fluid and edema and questionable  small abscess.  Overall, findings are slightly improved since prior.   Bladder wall thickening, although collapsed with mild adjacent fat stranding. Correlate for infectious/inflammatory cystitis.   Colonic diverticulosis, without diverticulitis.  Mild thickening of small-bowel loops, likely reactive.  No small bowel obstruction.   Nonspecific bilateral inguinal lymphadenopathy.   Bones are intact.   Impression:    Postsurgical changes of exploratory laparotomy at the midline with mild associated fluid and edema. Interval dehiscence or intervention at the laparotomy site. Correlate for recent intervention.  Postsurgical changes of colon with colostomy in the mid abdomen, left of midline. Mild infectious/inflammatory edema surrounding colostomy, similar to prior.  Previously noted ill-defined intraperitoneal/mesenteric fluid collection adjacent has decreased in size, now 28 x 22 mm (previously 51 x 48 mm). Slight decrease adjacent infectious/inflammatory edema throughout the mesentery.   Postsurgical changes in the left inguinal canal with associated infectious/inflammatory fluid and edema and questionable small abscess. Overall, findings are slightly improved since prior.   Bladder wall thickening, although collapsed with mild adjacent fat stranding. Correlate for infectious/inflammatory cystitis.           I spent a total of 80 minutes on the day of the visit.This includes face to face time and non-face to face time preparing to see the patient (eg, review of tests), obtaining and/or reviewing separately obtained history, documenting clinical information in the electronic or other health record, independently interpreting results and communicating results to the patient/family/caregiver, or care coordinator.      Chelita Martin NP  Date of Service: 10/28/2024      This note was created using [employer]  voice recognition software that occasionally misinterpreted phrases or words.

## 2024-10-28 NOTE — PROGRESS NOTES
[facility]  Progress Note    Patient Name: João Ham  MRN: [id number]  Patient Class: IP- Inpatient   Admission Date: 10/25/2024  Length of Stay: 0 days  Attending Physician: Narda Rivera MD  Primary Care Provider: Dawson Granado MD        Subjective:     Principal Problem:Postoperative wound breakdown        HPI:  44 year old pt getting admitted with suspected intra abdominal infection and draining wound in abdomen  This pt is HD dependent for ESRD and was in this hospital last month  Last month he got admitted with colonic perforation and abscess   He underwent laparotomy/colectomy and eventually colostomy  He went home 96 hrs ago  HH nurse noticed increased drainage from wound area and asked him to come to ER  He is on Eliquis which pt doesn't know why he is taking that  Denied fever but c/o on and off abdominal pain   In the ER today he was hypotensive and tachycardic     Overview/Hospital Course:  João Ham was monitored closely during his hospitalization.  CT abdomen and pelvis performed showed postsurgical changes in the left inguinal canal with associated infectious/inflammatory fluid and edema and questionable small abscess consistent with prior CT. He was hypotensive on presentation which improved with IVF.  He was placed on IV abx.  General surgery was consulted.  Patient to continue Dialysis MWF. Aerobic cultures +Pseudomonas and E. Coli sensitive to Merrem, Vancomycin discontinued.     Interval History:   Seen and examined.  NAD.  Patient did go for dialysis on today.  General surgery consulted, for postop complications.  ID consulted for positive wound cultures.  Nephrology following.   Review of Systems   Constitutional:  Negative for activity change, appetite change, chills, fatigue and fever.   Respiratory: Negative.     Cardiovascular: Negative.    Gastrointestinal:  Positive for abdominal pain.   Genitourinary: Negative.    Musculoskeletal:  "Negative.    Skin:  Positive for wound.     Objective:     Vital Signs (Most Recent):  Temp: 97.8 °F (36.6 °C) (10/28/24 0715)  Pulse: 86 (10/28/24 0715)  Resp: 18 (10/28/24 0715)  BP: (!) 175/103 (10/28/24 0715)  SpO2: 99 % (10/28/24 0715) Vital Signs (24h Range):  Temp:  [97.8 °F (36.6 °C)-99.4 °F (37.4 °C)] 97.8 °F (36.6 °C)  Pulse:  [] 86  Resp:  [15-18] 18  SpO2:  [95 %-99 %] 99 %  BP: (133-175)/() 175/103     Weight: 119.6 kg (263 lb 10.7 oz)  Body mass index is 33.85 kg/m².    Intake/Output Summary (Last 24 hours) at 10/28/2024 1018  Last data filed at 10/28/2024 0640  Gross per 24 hour   Intake 290 ml   Output 475 ml   Net -185 ml         Physical Exam  Vitals and nursing note reviewed. Exam conducted with a chaperone present.   Constitutional:       Appearance: Normal appearance. He is well-developed.   HENT:      Nose: No septal deviation.   Neck:      Thyroid: No thyroid mass.      Vascular: No JVD.      Trachea: No tracheal tenderness or tracheal deviation.   Cardiovascular:      Rate and Rhythm: Normal rate.      Pulses: Normal pulses.      Heart sounds: S1 normal and S2 normal.   Pulmonary:      Effort: Pulmonary effort is normal.      Breath sounds: Normal breath sounds. No decreased breath sounds, wheezing, rhonchi or rales.   Abdominal:      General: Bowel sounds are normal.      Palpations: Abdomen is soft. There is no hepatomegaly or splenomegaly.      Tenderness: There is abdominal tenderness.      Comments: Colostomy present   Skin:     General: Skin is warm.      Findings: No rash.      Comments: Lower abd wound   Neurological:      General: No focal deficit present.      Mental Status: He is alert and oriented to person, place, and time.   Psychiatric:         Mood and Affect: Affect is flat.             Significant Labs: All pertinent labs within the past 24 hours have been reviewed.  Blood Culture: No results for input(s): "LABBLOO" in the last 48 hours.  CBC:   Recent Labs   Lab " 10/27/24  0836 10/28/24  0441   WBC 6.46 5.78   HGB 8.5* 8.6*   HCT 27.0* 27.5*    422     CMP:   Recent Labs   Lab 10/27/24  0836 10/28/24  0441    137   K 4.3 4.7   CL 96 99   CO2 28 28    85   BUN 24* 30*   CREATININE 12.1* 14.5*   CALCIUM 8.8 8.9   PROT 7.2  --    ALBUMIN 3.6 3.5   BILITOT 0.4  --    ALKPHOS 77  --    AST 25  --    ALT 36  --    ANIONGAP 13 10       Magnesium:   Recent Labs   Lab 10/27/24  0836 10/28/24  0441   MG 1.8 1.8     Microbiology Results (last 7 days)       Procedure Component Value Units Date/Time    Aerobic culture [[id number]]  (Abnormal)  (Susceptibility) Collected: 10/25/24 1932    Order Status: Completed Specimen: Incision site from Abdomen Updated: 10/28/24 0640     Aerobic Bacterial Culture ESCHERICHIA COLI  Moderate        PSEUDOMONAS AERUGINOSA  Many      Blood culture x two cultures. Draw prior to antibiotics. [[id number]] Collected: 10/25/24 1748    Order Status: Completed Specimen: Blood from Peripheral, Antecubital, Right Updated: 10/27/24 2032     Blood Culture, Routine No Growth to date      No Growth to date      No Growth to date    Narrative:      Aerobic and anaerobic  Collection has been rescheduled by BNI at 10/25/2024 16:42 Reason:   Nurse tomasz request to come back  Collection has been rescheduled by [email or web address] at 10/25/2024 17:19 Reason:   Patient has fistula must wait 15 mins  Collection has been rescheduled by BNI at 10/25/2024 16:42 Reason:   Nurse tomasz request to come back  Collection has been rescheduled by [email or web address] at 10/25/2024 17:19 Reason:   Patient has fistula must wait 15 mins    Blood culture x two cultures. Draw prior to antibiotics. [[id number]] Collected: 10/25/24 1719    Order Status: Completed Specimen: Blood from Peripheral, Antecubital, Right Updated: 10/27/24 1832     Blood Culture, Routine No Growth to date      No Growth to date      No Growth to date    Narrative:      Aerobic and anaerobic  Collection has been rescheduled by  BNI at 10/25/2024 16:42 Reason:   Nurse tolbert request to come back  Collection has been rescheduled by BNI at 10/25/2024 16:42 Reason:   Nurse tomasz request to come back             Significant Imaging: I have reviewed all pertinent imaging results/findings within the past 24 hours.  Imaging Results              CT Abdomen Pelvis  Without Contrast (Final result)  Result time 10/25/24 22:11:31      Final result by Brant Prieto MD (10/25/24 22:11:31)                   Impression:      Postsurgical changes of exploratory laparotomy at the midline with mild associated fluid and edema. Interval dehiscence or intervention at the laparotomy site. Correlate for recent intervention.  Postsurgical changes of colon with colostomy in the mid abdomen, left of midline. Mild infectious/inflammatory edema surrounding colostomy, similar to prior.  Previously noted ill-defined intraperitoneal/mesenteric fluid collection adjacent has decreased in size, now 28 x 22 mm (previously 51 x 48 mm). Slight decrease adjacent infectious/inflammatory edema throughout the mesentery.    Postsurgical changes in the left inguinal canal with associated infectious/inflammatory fluid and edema and questionable small abscess. Overall, findings are slightly improved since prior.    Bladder wall thickening, although collapsed with mild adjacent fat stranding. Correlate for infectious/inflammatory cystitis.    Other findings above.      Electronically signed by: Brant Prieto  Date:    10/25/2024  Time:    22:11               Narrative:    EXAMINATION:  CT ABDOMEN PELVIS WITHOUT CONTRAST    CLINICAL HISTORY:  Abdominal abscess/infection suspected;    TECHNIQUE:  Low dose axial images, sagittal and coronal reformations were obtained from the lung bases to the pubic symphysis.  Oral contrast was not administered.    COMPARISON:  10/18/2024    FINDINGS:  Bilateral gynecomastia.    Lung bases are clear.    Unremarkable liver.  Cholelithiasis.   Unremarkable spleen and pancreas.    Kidneys are small and atrophic.  Left renal cyst.    Postsurgical changes of exploratory laparotomy at the midline with mild associated fluid and edema.  Interval dehiscence or intervention at the laparotomy site.  Correlate for recent intervention.  Postsurgical changes of colon with colostomy in the mid abdomen, left of midline.  Mild infectious/inflammatory edema surrounding colostomy, similar to prior.  Previously noted ill-defined intraperitoneal/mesenteric fluid collection adjacent has decreased in size, now 28 x 22 mm (previously 51 x 48 mm).  Slight decrease adjacent infectious/inflammatory edema throughout the mesentery.    Postsurgical changes in the left inguinal canal with associated infectious/inflammatory fluid and edema and questionable small abscess.  Overall, findings are slightly improved since prior.    Bladder wall thickening, although collapsed with mild adjacent fat stranding. Correlate for infectious/inflammatory cystitis.    Colonic diverticulosis, without diverticulitis.  Mild thickening of small-bowel loops, likely reactive.  No small bowel obstruction.    Nonspecific bilateral inguinal lymphadenopathy.    Bones are intact.                                       X-Ray Chest AP Portable (Final result)  Result time 10/25/24 17:44:09      Final result by Brant Prieto MD (10/25/24 17:44:09)                   Impression:      No acute findings.      Electronically signed by: Brant Prieto  Date:    10/25/2024  Time:    17:44               Narrative:    EXAMINATION:  XR CHEST AP PORTABLE    CLINICAL HISTORY:  Sepsis;    TECHNIQUE:  Single frontal view of the chest was performed.    COMPARISON:  10/17/2024    FINDINGS:  Lungs are clear. No focal consolidation. No pleural effusion. No pneumothorax. Normal heart size.                                       Assessment/Plan:      * Postoperative wound breakdown  As mentioned in HPI  Started on iv  vanc/iv meropenem/iv diflucan  CT abdomen/pelvis -  Postsurgical changes of exploratory laparotomy at the midline with mild associated fluid and edema. Interval dehiscence or intervention at the laparotomy site. Correlate for recent intervention. Postsurgical changes of colon with colostomy in the mid abdomen, left of midline. Mild infectious/inflammatory edema surrounding colostomy, similar to prior. Previously noted ill-defined intraperitoneal/mesenteric fluid collection adjacent has decreased in size, now 28 x 22 mm (previously 51 x 48 mm). Slight decrease adjacent infectious/inflammatory edema throughout the mesentery.   Postsurgical changes in the left inguinal canal with associated infectious/inflammatory fluid and edema and questionable small abscess.   General surgery consult:  Recommended no surgical interventions  Wound cultures positive: + Pseudomonas and E coli      History of ESBL E. coli infection  Microbiology Results (last 7 days)       Procedure Component Value Units Date/Time    Aerobic culture [[id number]]  (Abnormal)  (Susceptibility) Collected: 10/25/24 1932    Order Status: Completed Specimen: Incision site from Abdomen Updated: 10/28/24 0640     Aerobic Bacterial Culture ESCHERICHIA COLI  Moderate        PSEUDOMONAS AERUGINOSA  Many      Blood culture x two cultures. Draw prior to antibiotics. [[id number]] Collected: 10/25/24 1748    Order Status: Completed Specimen: Blood from Peripheral, Antecubital, Right Updated: 10/27/24 2032     Blood Culture, Routine No Growth to date      No Growth to date      No Growth to date    Narrative:      Aerobic and anaerobic  Collection has been rescheduled by BNI at 10/25/2024 16:42 Reason:   Nurse tomasz request to come back  Collection has been rescheduled by RICHARD at 10/25/2024 17:19 Reason:   Patient has fistula must wait 15 mins  Collection has been rescheduled by BNI at 10/25/2024 16:42 Reason:   Nurse tomasz request to come back  Collection has been  rescheduled by RICHARD at 10/25/2024 17:19 Reason:   Patient has fistula must wait 15 mins    Blood culture x two cultures. Draw prior to antibiotics. [[id number]] Collected: 10/25/24 1719    Order Status: Completed Specimen: Blood from Peripheral, Antecubital, Right Updated: 10/27/24 1832     Blood Culture, Routine No Growth to date      No Growth to date      No Growth to date    Narrative:      Aerobic and anaerobic  Collection has been rescheduled by BNI at 10/25/2024 16:42 Reason:   Nurse tomasz request to come back  Collection has been rescheduled by BNI at 10/25/2024 16:42 Reason:   Nurse tomasz request to come back              Anticoagulated  Change NOAC to SQ lovenox      Sepsis  This patient does have evidence of infective focus  My overall impression is sepsis.  Source: Abdominal  Antibiotics given-   Antibiotics (72h ago, onward)      Start     Stop Route Frequency Ordered    10/26/24 0830  meropenem injection 1 g         -- IV Every 12 hours (non-standard times) 10/25/24 2117          Latest lactate reviewed-  Recent Labs   Lab 10/25/24  1732 10/25/24  2131 10/26/24  0943   LACTATE  --    < > 1.0   POCLAC 3.21*  --   --     < > = values in this interval not displayed.       Organ dysfunction indicated by  hypotension, tachycardia, tachypnea    Fluid challenge Actual Body weight- Patient will receive 30ml/kg actual body weight to calculate fluid bolus for treatment of septic shock.     Post- resuscitation assessment Yes Perfusion exam was performed within 6 hours of septic shock presentation after bolus shows Adequate tissue perfusion assessed by non-invasive monitoring       Will Not start Pressors- Levophed for MAP of 65  Source control achieved by: IV antibiotics; general surgery consulted, ID consult  Wound cultures positive:  Pseudomonas and E coli    Essential hypertension  Patients blood pressure range in the last 24 hours was: BP  Min: 79/48  Max: 220/123.The patient's inpatient anti-hypertensive  regimen is listed below:  Current Antihypertensives  , Every morning, Oral  metoprolol succinate (TOPROL-XL) 24 hr tablet 150 mg, Daily, Oral  valsartan tablet 320 mg, Daily, Oral  hydrALAZINE tablet 100 mg, Every 8 hours, Oral    Plan  - BP is controlled, no changes needed to their regimen      ESRD (end stage renal disease)  Creatine stable for now. BMP reviewed- noted Estimated Creatinine Clearance: 10.8 mL/min (A) (based on SCr of 12.1 mg/dL (H)). according to latest data. Based on current GFR, CKD stage is end stage.  Monitor UOP and serial BMP and adjust therapy as needed. Renally dose meds. Avoid nephrotoxic medications and procedures.  Nephrology following   Dialysis MWF        VTE Risk Mitigation (From admission, onward)           Ordered     enoxaparin injection 120 mg  Daily         10/26/24 1733     IP VTE HIGH RISK PATIENT  Once         10/25/24 1757     Place sequential compression device  Until discontinued         10/25/24 1757                    Discharge Planning   MARIA ISABEL: 10/29/2024     Code Status: Full Code   Is the patient medically ready for discharge?:     Reason for patient still in hospital (select all that apply): Patient trending condition, Laboratory test, Treatment, and Consult recommendations  Discharge Plan A: Home                  Kat French NP  Department of Hospital Medicine   [facility]

## 2024-10-28 NOTE — CONSULTS
[facility]  General Surgery  Consult Note    Patient Name: João Ham  MRN: [id number]  Code Status: Full Code  Admission Date: 10/25/2024  Hospital Length of Stay: 0 days  Attending Physician: Narda Rivera MD  Primary Care Provider: Dawson Granado MD    Patient information was obtained from patient and ER records.     Consults  Subjective:     Principal Problem: Postoperative wound breakdown    History of Present Illness: 44-year-old gentleman whom I am familiar with.  He was status post exploratory laparotomy with a end-colostomy on September 25th.  He was admitted to the hospital October 25th secondary to wound drainage.  He had CT scan performed at that time which showed interval decrease in size of abdominal fluid collections.  I am seeing the patient today for the 1st time this admission.  He denies any pain.  He was eating regular diet.  No fevers or chills.  Wounds with no significant drainage    No current facility-administered medications on file prior to encounter.     Current Outpatient Medications on File Prior to Encounter   Medication Sig    aluminum-magnesium hydroxide-simethicone (MAALOX) 200-200-20 mg/5 mL Susp Take 30 mLs by mouth 4 (four) times daily as needed.    calcitRIOL (ROCALTROL) 0.25 MCG Cap Take 1 capsule (0.25 mcg total) by mouth once daily.    calcium carbonate (TUMS) 200 mg calcium (500 mg) chewable tablet Take 2 tablets (1,000 mg total) by mouth 3 (three) times daily.    cholecalciferol, vitamin D3, 125 mcg (5,000 unit) Tab Take 5,000 Units by mouth every morning.    cloNIDine (CATAPRES) 0.3 MG tablet Take 1 tablet (0.3 mg total) by mouth 3 (three) times daily as needed (SBP > 150).    epoetin mily-epbx (RETACRIT) 20,000 unit/mL injection Inject 0.67 mLs (13,400 Units total) into the skin every Mon, Wed, Fri.    hydrALAZINE (APRESOLINE) 100 MG tablet Take 1 tablet (100 mg total) by mouth every 8 (eight) hours. Hold for SBP < 120 and before dialysis     metoprolol succinate (TOPROL-XL) 50 MG 24 hr tablet Take 150 mg by mouth once daily.    olmesartan (BENICAR) 40 MG tablet Take 1 tablet (40 mg total) by mouth once daily.    oxyCODONE (ROXICODONE) 20 mg Tab immediate release tablet Take 1 tablet by mouth every 12 (twelve) hours.    sevelamer carbonate (RENVELA) 800 mg Tab Take 2 tablets (1,600 mg total) by mouth 3 (three) times daily with meals.    acetaminophen (TYLENOL) 325 MG tablet Take 2 tablets (650 mg total) by mouth every 4 (four) hours as needed. (Patient not taking: Reported on 10/25/2024)    ALPRAZolam (XANAX) 0.25 MG tablet Take 1 tablet (0.25 mg total) by mouth nightly as needed for Insomnia. (Patient not taking: Reported on 10/25/2024)    apixaban (ELIQUIS) 2.5 mg Tab Take 2.5 mg by mouth 2 (two) times daily.    gabapentin (NEURONTIN) 100 MG capsule Take 1 capsule (100 mg total) by mouth 3 (three) times daily. (Patient not taking: Reported on 10/25/2024)    heparin sodium,porcine (HEPARIN, PORCINE,) 1,000 unit/mL injection Inject 4 mLs (4,000 Units total) into the vein as needed (line care after dialysis).    heparin sodium,porcine (HEPARIN, PORCINE,) 5,000 unit/mL injection Inject 1 mL (5,000 Units total) into the skin every 8 (eight) hours.    HYDROmorphone (DILAUDID) 4 MG tablet Take 1 tablet (4 mg total) by mouth every 4 (four) hours as needed for Pain. (Patient not taking: Reported on 10/25/2024)    insulin aspart U-100 (NOVOLOG) 100 unit/mL (3 mL) InPn pen Inject 0-5 Units into the skin before meals and at bedtime as needed (Hyperglycemia). (Patient not taking: Reported on 10/25/2024)    isosorbide mononitrate (IMDUR) 120 MG 24 hr tablet Take 120 mg by mouth every morning.    isosorbide mononitrate (IMDUR) 30 MG 24 hr tablet Take 1 tablet (30 mg total) by mouth once daily.    ketoconazole (NIZORAL) 2 % shampoo Apply topically every other day. (Patient not taking: Reported on 10/25/2024)    LIDOcaine-prilocaine (EMLA) cream Apply topically as  needed (pre dialysis).    melatonin (MELATIN) 3 mg tablet Take 2 tablets (6 mg total) by mouth nightly as needed for Insomnia. (Patient not taking: Reported on 10/25/2024)    miconazole (MICOTIN) 2 % cream Apply topically 2 (two) times daily. (Patient not taking: Reported on 10/25/2024)    ondansetron (ZOFRAN-ODT) 4 MG TbDL Take 1 tablet (4 mg total) by mouth every 6 (six) hours as needed (nausea). (Patient not taking: Reported on 10/25/2024)    oxymetazoline (AFRIN) 0.05 % nasal spray 1 spray by Nasal route 2 (two) times daily. (Patient not taking: Reported on 10/25/2024)    simethicone (MYLICON) 80 MG chewable tablet Take 1 tablet (80 mg total) by mouth 3 (three) times daily as needed for Flatulence.       Review of patient's allergies indicates:   Allergen Reactions    Mushroom Swelling     Tongue swells    Tongue swells       Tongue swells       Past Medical History:   Diagnosis Date    Allergy     Anemia     Anxiety     CHF (congestive heart failure)     Depression     High blood pressure with chronic kidney disease, stage 5 chronic kidney disease or end stage renal disease     Hypertension      Past Surgical History:   Procedure Laterality Date    ABSCESS DRAINAGE Left 9/17/2024    Procedure: DRAINAGE, ABSCESS, GROIN;  Surgeon: Galileo Connors MD;  Location: [facility] OR;  Service: General;  Laterality: Left;    COLON RESECTION  9/25/2024    Procedure: COLON RESECTION;  Surgeon: Galileo Connors MD;  Location: [facility] OR;  Service: General;;    FISTULOGRAM Bilateral 4/30/2024    Procedure: Fistulogram;  Surgeon: Khoobehi, Ali, MD;  Location: [facility] CATH/EP LAB;  Service: Vascular;  Laterality: Bilateral;    FISTULOGRAM Left 9/24/2024    Procedure: Fistulogram;  Surgeon: Lorraine Salvador MD;  Location: [facility] CATH/EP LAB;  Service: General;  Laterality: Left;    HERNIA REPAIR Right     With mesh    INSERTION, CATHETER, TUNNELED N/A 6/22/2023    Procedure: Insertion,catheter,tunneled;  Surgeon: Everett Caicedo MD;   Location: [facility] OR;  Service: General;  Laterality: N/A;    LAPAROTOMY, EXPLORATORY N/A 9/25/2024    Procedure: LAPAROTOMY, EXPLORATORY;  Surgeon: Galileo Connors MD;  Location: [facility] OR;  Service: General;  Laterality: N/A;    MOBILIZATION OF SPLENIC FLEXURE  9/25/2024    Procedure: MOBILIZATION, SPLENIC FLEXURE;  Surgeon: Galileo Connors MD;  Location: [facility] OR;  Service: General;;    PERCUTANEOUS TRANSLUMINAL ANGIOPLASTY OF ARTERIOVENOUS FISTULA Left 4/30/2024    Procedure: PTA, AV FISTULA;  Surgeon: Khoobehi, Ali, MD;  Location: [facility] CATH/EP LAB;  Service: Vascular;  Laterality: Left;    PERCUTANEOUS TRANSLUMINAL ANGIOPLASTY OF ARTERIOVENOUS FISTULA Left 9/24/2024    Procedure: PTA, AV FISTULA;  Surgeon: Lorraine Salvador MD;  Location: [facility] CATH/EP LAB;  Service: General;  Laterality: Left;    PHLEBOGRAPHY N/A 7/19/2024    Procedure: Venogram;  Surgeon: Khoobehi, Ali, MD;  Location: [facility] CATH/EP LAB;  Service: Vascular;  Laterality: N/A;    REMOVAL, TUNNELED CATH Right 7/19/2024    Procedure: REMOVAL, TUNNELED CATH;  Surgeon: Khoobehi, Ali, MD;  Location: [facility] CATH/EP LAB;  Service: Vascular;  Laterality: Right;    ROBOT-ASSISTED LAPAROSCOPIC RESECTION OF SIGMOID COLON USING DA DELANO XI N/A 9/17/2024    Procedure: XI ROBOTIC SIGMOID RESECTION, WITH ANASTAMOSIS;  Surgeon: Galileo Connors MD;  Location: [facility];  Service: General;  Laterality: N/A;  possible open have instruments available     Family History       Problem Relation (Age of Onset)    Hypertension Mother          Tobacco Use    Smoking status: Never     Passive exposure: Never    Smokeless tobacco: Never   Substance and Sexual Activity    Alcohol use: Never    Drug use: Never    Sexual activity: Not Currently     Review of Systems   Constitutional:  Negative for activity change and appetite change.   Gastrointestinal:  Negative for abdominal distention.     Objective:     Vital Signs (Most Recent):  Temp: 97.8 °F (36.6 °C) (10/28/24 0715)  Pulse:  86 (10/28/24 0715)  Resp: 18 (10/28/24 0715)  BP: (!) 175/103 (10/28/24 0715)  SpO2: 99 % (10/28/24 0715) Vital Signs (24h Range):  Temp:  [97.8 °F (36.6 °C)-99.4 °F (37.4 °C)] 97.8 °F (36.6 °C)  Pulse:  [] 86  Resp:  [15-18] 18  SpO2:  [95 %-99 %] 99 %  BP: (133-175)/() 175/103     Weight: 119.6 kg (263 lb 10.7 oz)  Body mass index is 33.85 kg/m².     Physical Exam  Vitals reviewed.   Pulmonary:      Effort: Pulmonary effort is normal.   Abdominal:      General: There is no distension.      Tenderness: There is no abdominal tenderness.      Hernia: No hernia is present.   Neurological:      Mental Status: He is alert.   Psychiatric:         Mood and Affect: Mood normal.            I have reviewed all pertinent lab results within the past 24 hours.  CBC:   Recent Labs   Lab 10/28/24  0441   WBC 5.78   RBC 2.75*   HGB 8.6*   HCT 27.5*      *   MCH 31.3*   MCHC 31.3*     BMP:   Recent Labs   Lab 10/28/24  0441   GLU 85      K 4.7   CL 99   CO2 28   BUN 30*   CREATININE 14.5*   CALCIUM 8.9   MG 1.8       Significant Diagnostics:  CT reviewed.  Interval decrease in size of fluid collections in the abdominal cavity      Assessment/Plan:     * Postoperative wound breakdown  Wound appears to be healing well.  Intra-abdominal fluid collections were decreasing in size.  Continue supportive care.  No further surgical intervention at the present time.  Okay to discharge to home when cleared by Medicine.  He will follow up with me in the office in 2-3 weeks      VTE Risk Mitigation (From admission, onward)           Ordered     enoxaparin injection 120 mg  Daily         10/26/24 1733     IP VTE HIGH RISK PATIENT  Once         10/25/24 1757     Place sequential compression device  Until discontinued         10/25/24 1757                    Thank you for your consult. I will follow-up with patient. Please contact us if you have any additional questions.    Galileo Connors MD  General  Surgery  [facility]

## 2024-10-28 NOTE — PLAN OF CARE
Problem: Violence Risk or Actual  Goal: Anger and Impulse Control  Outcome: Progressing     Problem: Adult Inpatient Plan of Care  Goal: Plan of Care Review  Outcome: Progressing  Goal: Patient-Specific Goal (Individualized)  Outcome: Progressing  Goal: Absence of Hospital-Acquired Illness or Injury  Outcome: Progressing  Goal: Optimal Comfort and Wellbeing  Outcome: Progressing  Goal: Readiness for Transition of Care  Outcome: Progressing     Problem: Sepsis/Septic Shock  Goal: Optimal Coping  Outcome: Progressing  Goal: Absence of Bleeding  Outcome: Progressing  Goal: Blood Glucose Level Within Targeted Range  Outcome: Progressing  Goal: Absence of Infection Signs and Symptoms  Outcome: Progressing  Goal: Optimal Nutrition Intake  Outcome: Progressing     Problem: Wound  Goal: Optimal Coping  Outcome: Progressing  Goal: Optimal Functional Ability  Outcome: Progressing  Goal: Absence of Infection Signs and Symptoms  Outcome: Progressing  Goal: Improved Oral Intake  Outcome: Progressing  Goal: Optimal Pain Control and Function  Outcome: Progressing  Goal: Skin Health and Integrity  Outcome: Progressing  Goal: Optimal Wound Healing  Outcome: Progressing     Problem: Hemodialysis  Goal: Safe, Effective Therapy Delivery  Outcome: Progressing  Goal: Effective Tissue Perfusion  Outcome: Progressing  Goal: Absence of Infection Signs and Symptoms  Outcome: Progressing

## 2024-10-28 NOTE — ASSESSMENT & PLAN NOTE
As mentioned in HPI  Started on iv vanc/iv meropenem/iv diflucan  CT abdomen/pelvis -  Postsurgical changes of exploratory laparotomy at the midline with mild associated fluid and edema. Interval dehiscence or intervention at the laparotomy site. Correlate for recent intervention. Postsurgical changes of colon with colostomy in the mid abdomen, left of midline. Mild infectious/inflammatory edema surrounding colostomy, similar to prior. Previously noted ill-defined intraperitoneal/mesenteric fluid collection adjacent has decreased in size, now 28 x 22 mm (previously 51 x 48 mm). Slight decrease adjacent infectious/inflammatory edema throughout the mesentery.   Postsurgical changes in the left inguinal canal with associated infectious/inflammatory fluid and edema and questionable small abscess.   General surgery consult:  Recommended no surgical interventions  Wound cultures positive: + Pseudomonas and E coli

## 2024-10-28 NOTE — ASSESSMENT & PLAN NOTE
This patient does have evidence of infective focus  My overall impression is sepsis.  Source: Abdominal  Antibiotics given-   Antibiotics (72h ago, onward)      Start     Stop Route Frequency Ordered    10/26/24 0830  meropenem injection 1 g         -- IV Every 12 hours (non-standard times) 10/25/24 2117          Latest lactate reviewed-  Recent Labs   Lab 10/25/24  1732 10/25/24  2131 10/26/24  0943   LACTATE  --    < > 1.0   POCLAC 3.21*  --   --     < > = values in this interval not displayed.       Organ dysfunction indicated by  hypotension, tachycardia, tachypnea    Fluid challenge Actual Body weight- Patient will receive 30ml/kg actual body weight to calculate fluid bolus for treatment of septic shock.     Post- resuscitation assessment Yes Perfusion exam was performed within 6 hours of septic shock presentation after bolus shows Adequate tissue perfusion assessed by non-invasive monitoring       Will Not start Pressors- Levophed for MAP of 65  Source control achieved by: IV antibiotics; general surgery consulted, ID consult  Wound cultures positive:  Pseudomonas and E coli

## 2024-10-29 VITALS
DIASTOLIC BLOOD PRESSURE: 81 MMHG | BODY MASS INDEX: 33.84 KG/M2 | SYSTOLIC BLOOD PRESSURE: 167 MMHG | TEMPERATURE: 98 F | RESPIRATION RATE: 18 BRPM | WEIGHT: 263.69 LBS | OXYGEN SATURATION: 98 % | HEART RATE: 90 BPM | HEIGHT: 74 IN

## 2024-10-29 LAB
ALBUMIN SERPL BCP-MCNC: 3.6 G/DL (ref 3.5–5.2)
ANION GAP SERPL CALC-SCNC: 10 MMOL/L (ref 8–16)
BASOPHILS # BLD AUTO: 0.03 K/UL (ref 0–0.2)
BASOPHILS NFR BLD: 0.5 % (ref 0–1.9)
BUN SERPL-MCNC: 26 MG/DL (ref 6–20)
CALCIUM SERPL-MCNC: 9 MG/DL (ref 8.7–10.5)
CHLORIDE SERPL-SCNC: 101 MMOL/L (ref 95–110)
CO2 SERPL-SCNC: 27 MMOL/L (ref 23–29)
CREAT SERPL-MCNC: 11.6 MG/DL (ref 0.5–1.4)
DIFFERENTIAL METHOD BLD: ABNORMAL
EOSINOPHIL # BLD AUTO: 0.2 K/UL (ref 0–0.5)
EOSINOPHIL NFR BLD: 4 % (ref 0–8)
ERYTHROCYTE [DISTWIDTH] IN BLOOD BY AUTOMATED COUNT: 18.5 % (ref 11.5–14.5)
EST. GFR  (NO RACE VARIABLE): 5.1 ML/MIN/1.73 M^2
GLUCOSE SERPL-MCNC: 94 MG/DL (ref 70–110)
HCT VFR BLD AUTO: 29.1 % (ref 40–54)
HGB BLD-MCNC: 8.9 G/DL (ref 14–18)
IMM GRANULOCYTES # BLD AUTO: 0.01 K/UL (ref 0–0.04)
IMM GRANULOCYTES NFR BLD AUTO: 0.2 % (ref 0–0.5)
LYMPHOCYTES # BLD AUTO: 1.6 K/UL (ref 1–4.8)
LYMPHOCYTES NFR BLD: 26.1 % (ref 18–48)
MAGNESIUM SERPL-MCNC: 1.9 MG/DL (ref 1.6–2.6)
MCH RBC QN AUTO: 30.5 PG (ref 27–31)
MCHC RBC AUTO-ENTMCNC: 30.6 G/DL (ref 32–36)
MCV RBC AUTO: 100 FL (ref 82–98)
MONOCYTES # BLD AUTO: 1 K/UL (ref 0.3–1)
MONOCYTES NFR BLD: 16.7 % (ref 4–15)
NEUTROPHILS # BLD AUTO: 3.1 K/UL (ref 1.8–7.7)
NEUTROPHILS NFR BLD: 52.5 % (ref 38–73)
NRBC BLD-RTO: 0 /100 WBC
PHOSPHATE SERPL-MCNC: 5.5 MG/DL (ref 2.7–4.5)
PLATELET # BLD AUTO: 443 K/UL (ref 150–450)
PMV BLD AUTO: 9.2 FL (ref 9.2–12.9)
POTASSIUM SERPL-SCNC: 4.7 MMOL/L (ref 3.5–5.1)
RBC # BLD AUTO: 2.92 M/UL (ref 4.6–6.2)
SODIUM SERPL-SCNC: 138 MMOL/L (ref 136–145)
WBC # BLD AUTO: 5.94 K/UL (ref 3.9–12.7)

## 2024-10-29 PROCEDURE — 93005 ELECTROCARDIOGRAM TRACING: CPT | Performed by: INTERNAL MEDICINE

## 2024-10-29 PROCEDURE — 80069 RENAL FUNCTION PANEL: CPT | Performed by: INTERNAL MEDICINE

## 2024-10-29 PROCEDURE — 83735 ASSAY OF MAGNESIUM: CPT | Performed by: INTERNAL MEDICINE

## 2024-10-29 PROCEDURE — 25000003 PHARM REV CODE 250: Performed by: INTERNAL MEDICINE

## 2024-10-29 PROCEDURE — 85025 COMPLETE CBC W/AUTO DIFF WBC: CPT | Performed by: INTERNAL MEDICINE

## 2024-10-29 PROCEDURE — 36415 COLL VENOUS BLD VENIPUNCTURE: CPT | Performed by: INTERNAL MEDICINE

## 2024-10-29 PROCEDURE — 93010 ELECTROCARDIOGRAM REPORT: CPT | Mod: ,,, | Performed by: INTERNAL MEDICINE

## 2024-10-29 PROCEDURE — 99233 SBSQ HOSP IP/OBS HIGH 50: CPT | Mod: ,,, | Performed by: INTERNAL MEDICINE

## 2024-10-29 PROCEDURE — 25000003 PHARM REV CODE 250

## 2024-10-29 RX ORDER — L. ACIDOPHILUS/L.BULGARICUS 100MM CELL
1 GRANULES IN PACKET (EA) ORAL 2 TIMES DAILY
Status: DISCONTINUED | OUTPATIENT
Start: 2024-10-29 | End: 2024-10-29 | Stop reason: HOSPADM

## 2024-10-29 RX ORDER — FAMOTIDINE 20 MG/1
20 TABLET, FILM COATED ORAL DAILY
Qty: 7 TABLET | Refills: 0 | Status: SHIPPED | OUTPATIENT
Start: 2024-10-29 | End: 2024-11-05

## 2024-10-29 RX ORDER — MUPIROCIN 20 MG/G
OINTMENT TOPICAL 2 TIMES DAILY
Qty: 1 G | Refills: 0 | Status: SHIPPED | OUTPATIENT
Start: 2024-10-29 | End: 2024-11-02

## 2024-10-29 RX ORDER — CIPROFLOXACIN 500 MG/1
500 TABLET ORAL NIGHTLY
Qty: 7 TABLET | Refills: 0 | Status: SHIPPED | OUTPATIENT
Start: 2024-10-29 | End: 2024-11-05

## 2024-10-29 RX ORDER — POLYETHYLENE GLYCOL 3350 17 G/17G
17 POWDER, FOR SOLUTION ORAL 2 TIMES DAILY PRN
Status: DISCONTINUED | OUTPATIENT
Start: 2024-10-29 | End: 2024-10-29 | Stop reason: HOSPADM

## 2024-10-29 RX ORDER — DOCUSATE SODIUM 100 MG/1
100 CAPSULE, LIQUID FILLED ORAL 2 TIMES DAILY
Qty: 28 CAPSULE | Refills: 0 | Status: SHIPPED | OUTPATIENT
Start: 2024-10-29 | End: 2024-11-12

## 2024-10-29 RX ADMIN — FAMOTIDINE 20 MG: 20 TABLET ORAL at 10:10

## 2024-10-29 RX ADMIN — MUPIROCIN 1 G: 20 OINTMENT TOPICAL at 10:10

## 2024-10-29 RX ADMIN — HYDRALAZINE HYDROCHLORIDE 100 MG: 25 TABLET ORAL at 06:10

## 2024-10-29 RX ADMIN — LACTOBACILLUS ACIDOPHILUS / LACTOBACILLUS BULGARICUS 1 EACH: 100 MILLION CFU STRENGTH GRANULES at 09:10

## 2024-10-29 RX ADMIN — METOPROLOL SUCCINATE 150 MG: 50 TABLET, FILM COATED, EXTENDED RELEASE ORAL at 09:10

## 2024-10-29 NOTE — PLAN OF CARE
Referral sent to [facility] home health via [employer].        10/29/24 8242   Post-Acute Status   Post-Acute Authorization Home Health   Home Health Status Referrals Sent        no

## 2024-10-29 NOTE — PROGRESS NOTES
Wound care seen Pt for ostomy teaching.  Removed pouch with adhesive remover.  Stoma and chen stomal skin cleaned with Saline.  Stoma noted red, moist and above skin level.  Os in center of stoma.  Pt has mucocutaneous separation at 3 o'clock noted with 80% red, granular tissue and 20% slough tissue. Applied stoma powder to separation, moistened with skin prep, covered with thin hydrocolloid.  Stoma measures 35mm.  Applied pouch over  separation dressing.  Pt was able to recite treatment to MC separation and Pouch change steps.  Pt mentioned he is having swelling to left groin area near incision.  Noted swelling with mild induration to chen area at medial aspect of incision.  Wound care Notified NP-Sheeba French to see Pt regarding swelling to groin. Dressings to abdominal incision wound removed and removed packing to left groin wound intact, 1 piece Aquacel Ag.      @1504--Pt seen with LEONEL French NP.  Pt was informed swelling may be due to scar tissue from surgery.  Pt will follow up with Surgeon and Wound Care when discharged.    Abdominal incision wound cleaned with Saline.  Packed lightly with Aquacel ag.  Covered with gauze and tape.  Left groin medial incision wound packed lightly with Aquacel ag and covered with remainder Aquacel Ag to have 1 piece Aquacel Ag to entire wound.  Covered with Gauze and tape.     10/29/24 1310   WOCN Assessment   WOCN Total Time (mins) 60   Visit Date 10/29/24   Visit Time 1310   Consult Type Follow Up   WOCN Speciality Wound;Ostomy   WOCN List colostomy   Wound surgical   Number of Wounds 3   Ostomy Type Colostomy   Procedure ostomy pouch   Intervention assessed;changed;applied;chart review   Teaching on-going        Wound 09/25/24 1521 Incision Left anterior Groin   Date First Assessed/Time First Assessed: 09/25/24 1521   Present on Original Admission: No  Primary Wound Type: Incision  Side: Left  Orientation: anterior  Location: Groin  Closure Method: (c) No Closure (see  comments)   Wound Image    Dressing Appearance Dry;Intact   Drainage Amount Small   Drainage Characteristics/Odor Serous;Tan   Appearance Red   Periwound Area Intact;Dry   Wound Edges Open   Care Cleansed with:;Sterile normal saline        Wound 10/26/24 0005 Incision medial;lower Lower quadrant   Date First Assessed/Time First Assessed: 10/26/24 0005   Present on Original Admission: Yes  Primary Wound Type: Incision  Orientation: medial;lower  Location: Lower quadrant  Additional Comments: (c)    Dressing Appearance Intact   Drainage Amount Moderate   Drainage Characteristics/Odor Serous;Tan   Appearance Pink;Red;Moist;Granulating   Periwound Area Intact   Wound Edges Open   Care Cleansed with:;Sterile normal saline        Colostomy 09/25/24 1200 LLQ   Placement Date/Time: 09/25/24 (c) 1200   Inserted by: MD  Location: [facility]   Wound Image    Stomal Appliance 1 piece;Intact;Changed;Per patient home schedule   Stoma Appearance round;moist;red;protruding above skin level;mucocutaneous separation   Stoma Size (in) 35mm   Site Assessment Clean;Mucocutaneous separation;Red;Moist;Protruding above skin level   Peristomal Assessment Intact;Dry;Pink   Accessories/Skin Care barrier substance over peristomal skin;cleansed w/ sterile normal saline;skin sealant   Stoma Function flatus;stool   Treatment Bag change;Site care     Abdominal incision wound      Colostomy Pouch change:  ~Remove current pouch with adhesive remover.  ~clean stoma and chen stomal skin with Saline or Water.  ~apply Stoma powder to Mucocutaneous separation and Pat with Skin barrier or Water. Cover with Thin Hydrocolloid (half of Barrier extender given to Pt).  ~Measure stoma and cut pouch as indicated.  ~Remove backing and place pouch.  May use Barrier extenders as needed to secure pouch.    Wound care to Abdomen and Left Groin:  Abdominal wound:  ~Clean both wounds with wound cleanser, Pat dry.  ~Pack Aquacel Ag rope lightly into abdominal wound.    ~cover  wound with  Gauze and Tape or Island dressing.    Left Groin--  ~Clean wounds with wound cleanser, Pat dry  ~Pack Aquacel Ag rope lightly into medial open wound.    ~Cover remaining incision line to cover lateral open wound with Aquacel Ag rope.    ~Cover with Gauze and Tape or Island dressing.      10/29/2024

## 2024-10-29 NOTE — PROGRESS NOTES
[facility]   Department of Infectious Disease  Progress Note        PATIENT NAME: João Ham  MRN: [id number]  TODAY'S DATE: 10/29/2024  ADMIT DATE: 10/25/2024  LOS: 1 days    CHIEF COMPLAINT: Wound Check (Had colostomy sx back in September and d/c this week after 45 days. Home health came out yesterday and stated that his wound had a foul smell. )      PRINCIPLE PROBLEM: Postoperative wound breakdown    INTERVAL HISTORY      10/29/2024: Seen and evaluated at bedside.  No acute issues overnight.      Antibiotics (From admission, onward)      Start     Stop Route Frequency Ordered    10/29/24 2100  ciprofloxacin HCl tablet 500 mg         11/05/24 2059 Oral Nightly 10/28/24 1600    10/28/24 1145  mupirocin 2 % ointment         11/02/24 0859 Nasl 2 times daily 10/28/24 1044          Antifungals (From admission, onward)      None           Antivirals (From admission, onward)      None            ASSESSMENT and PLAN      1. Slow to heal lower abdominal surgical wound.  Has wound infection.  Improved on antibiotics.  Switch to ciprofloxacin 250 mg b.i.d. times 10 days through 11/08/2024.  Wound care as per general surgeon and Wound Care Service.      2. ESRD on hemodialysis    3. Seborrheic dermatitis.  Much better.    RECOMMENDATIONS:    Ciprofloxacin 250 mg b.i.d. or 500 mg daily times 10 days  Continue wound care as per general surgeon  He can follow up with ID in 2-3 weeks    Thank you for involving ID in the care of this patient.  ID service will drop off today and see in 2-3 weeks for follow up.  Please send Epic secure chat with any questions.      SUBJECTIVE    He is known to be from previous encounter.  He had incarcerated left inguinal hernia with necrosis and left inguinal abscess managed with I&D of abscess, partial sigmoid colectomy and primary anastomosis on 09/17/2024.  Later complicated by anastomotic leak and had another surgery with laparotomy on 09/24/2024 with colectomy and colostomy.  He  completed antibiotic course 10/09/2024.  Had a prolonged stay and was ultimately discharged 10/21/2024.  Back in the hospital 10/25/2024 with drainage from the abdominal incision.  Cultures have grown Pseudomonas and E coli that are sensitive to ciprofloxacin.      Antibiotics  Vancomycin:  10/24/2024 x1 dose   Fluconazole:  10/25/2020 04/10/2020 7/24   Ertapenem:  10/25/2024-10/28/2024    Microbiology  Blood culture 10/24/2024: NGTD  Abdominal wound drainage culture 10/25/2024: Pseudomonas aeruginosa, E coli    Review of Systems  Negative except as stated above in Interval History     OBJECTIVE   Temp:  [98 °F (36.7 °C)-98.7 °F (37.1 °C)] 98.1 °F (36.7 °C)  Pulse:  [] 86  Resp:  [17-18] 18  SpO2:  [96 %-100 %] 96 %  BP: (115-196)/() 146/82  Temp:  [98 °F (36.7 °C)-98.7 °F (37.1 °C)]   Temp: 98.1 °F (36.7 °C) (10/29/24 0701)  Pulse: 86 (10/29/24 0959)  Resp: 18 (10/29/24 0701)  BP: (!) 146/82 (10/29/24 0959)  SpO2: 96 % (10/29/24 0701)    Intake/Output Summary (Last 24 hours) at 10/29/2024 1100  Last data filed at 10/29/2024 0837  Gross per 24 hour   Intake 1280 ml   Output 3725 ml   Net -2445 ml       Physical Exam  General:  Obese middle-aged man lying quietly in bed in no acute distress   Abdomen: Full, soft, nontender.  Colostomy in place.  Dressing lower mid abdomen.  Not taken down today   CVS: S1 and 2 heard   Respiratory: Clear to auscultation   Skin: No rash appreciated   Musculoskeletal: No joint or bony abnormalities appreciated   Left upper extremity:  Av fistula with thrill    VAD:  PIV  ISOLATION:  None    WOUNDS:  Midline abdominal wound    Significant Labs: All pertinent labs within the past 24 hours have been reviewed.    CBC LAST 7 DAYS  Recent Labs   Lab 10/25/24  0904 10/25/24  1710 10/26/24  0943 10/27/24  0836 10/28/24  0441 10/29/24  0649   WBC 5.41 5.26 6.39 6.46 5.78 5.94   RBC 2.87* 2.71* 2.62* 2.70* 2.75* 2.92*   HGB 9.0* 8.5* 8.0* 8.5* 8.6* 8.9*   HCT 28.4* 26.8* 26.2* 27.0*  "27.5* 29.1*   MCV 99* 99* 100* 100* 100* 100*   MCH 31.4* 31.4* 30.5 31.5* 31.3* 30.5   MCHC 31.7* 31.7* 30.5* 31.5* 31.3* 30.6*   RDW 19.4* 19.3* 19.1* 18.9* 18.6* 18.5*    376 380 400 422 443   MPV 9.0* 9.2 9.1* 8.7* 9.3 9.2   GRAN 52.4  2.8 60.6  3.2 58.7  3.8 55.5  3.6 49.1  2.8 52.5  3.1   LYMPH 25.1  1.4 20.0  1.1 21.6  1.4 22.3  1.4 31.1  1.8 26.1  1.6   MONO 15.5*  0.8 15.4*  0.8 14.6  0.9 14.4  0.9 13.7  0.8 16.7*  1.0   BASO 0.04 0.03 0.02 0.04 0.03 0.03   NRBC 0 0 0 0 0 0       CHEMISTRY LAST 7 DAYS  Recent Labs   Lab 10/25/24  0904 10/25/24  1710 10/26/24  0943 10/27/24  0836 10/28/24  0441 10/29/24  0649    133* 133* 137 137 138   K 4.8 3.8 4.3 4.3 4.7 4.7   CL 96 93* 95 96 99 101   CO2 25 30* 28 28 28 27   ANIONGAP 18* 10 10 13 10 10   BUN 30* 12 16 24* 30* 26*   CREATININE 14.1* 7.0* 9.3* 12.1* 14.5* 11.6*    115* 87 100 85 94   CALCIUM 10.0 8.4* 8.5* 8.8 8.9 9.0   MG  --   --  1.7 1.8 1.8 1.9   ALBUMIN 3.3* 3.8 3.6 3.6 3.5 3.6   PROT 8.6* 7.7 7.0 7.2  --   --    ALKPHOS 102 87 78 77  --   --    ALT 72* 57* 43 36  --   --    AST 55* 46* 34 25  --   --    BILITOT 0.5 0.5 0.4 0.4  --   --        Estimated Creatinine Clearance: 11.3 mL/min (A) (based on SCr of 11.6 mg/dL (H)).    INFLAMMATORY/PROCAL  LAST 7 DAYS  Recent Labs   Lab 10/25/24  0904 10/25/24  1710 10/26/24  0943   PROCAL  --  1.390* 1.573*   CRP 16.8*  --   --      No results found for: "ESR"  CRP   Date Value Ref Range Status   10/25/2024 16.8 (H) 0.0 - 8.2 mg/L Final   10/04/2024 17.00 (H) <1.00 mg/dL Final     Comment:     CRP-Normal Application expected values:   <1.0        mg/dL   Normal Range  1.0 - 5.0  mg/dL   Indicates mild inflammation  5.0 - 10.0 mg/dL   Indicates severe inflammation  >10.0        mg/dL   Represents serious processes and   frequently         indicates the presence of a bacterial   infection.      10/03/2024 20.00 (H) <1.00 mg/dL Final     Comment:     CRP-Normal Application " expected values:   <1.0        mg/dL   Normal Range  1.0 - 5.0  mg/dL   Indicates mild inflammation  5.0 - 10.0 mg/dL   Indicates severe inflammation  >10.0        mg/dL   Represents serious processes and   frequently         indicates the presence of a bacterial   infection.      09/24/2024 35.70 (H) <1.00 mg/dL Final     Comment:     CRP-Normal Application expected values:   <1.0        mg/dL   Normal Range  1.0 - 5.0  mg/dL   Indicates mild inflammation  5.0 - 10.0 mg/dL   Indicates severe inflammation  >10.0        mg/dL   Represents serious processes and   frequently         indicates the presence of a bacterial   infection.      09/20/2024 >16.00 (H) <1.00 mg/dL Final     Comment:     CRP-Normal Application expected values:   <1.0        mg/dL   Normal Range  1.0 - 5.0  mg/dL   Indicates mild inflammation  5.0 - 10.0 mg/dL   Indicates severe inflammation  >10.0        mg/dL   Represents serious processes and   frequently         indicates the presence of a bacterial   infection.      07/16/2024 >16.00 (H) <1.00 mg/dL Final     Comment:     CRP-Normal Application expected values:   <1.0        mg/dL   Normal Range  1.0 - 5.0  mg/dL   Indicates mild inflammation  5.0 - 10.0 mg/dL   Indicates severe inflammation  >10.0        mg/dL   Represents serious processes and   frequently         indicates the presence of a bacterial   infection.          PRIOR  MICROBIOLOGY:    Susceptibility data from last 90 days.  Collected Specimen Info Organism Amp/Sulbactam Ampicillin Cefazolin Cefepime Ceftriaxone Ciprofloxacin Ertapenem Gentamicin Levofloxacin Meropenem PIPERACILLIN/TAZO SUSCEPTIBILITY Tetracycline Tobramycin Trimeth/Sulfa   10/25/24 Incision site from Abdomen Escherichia coli  I  R  R  S  S  S  S  S  S  S  S  S  S  S     Pseudomonas aeruginosa     I   S    S  S  S      10/17/24 Blood from Peripheral, Hand, Left No growth after 5 days.                 10/17/24 Blood from Peripheral, Antecubital, Left No growth  after 5 days.                 09/25/24 Incision site from Abdomen Parabacteroides distasonis                 09/25/24 Incision site from Abdomen Escherichia coli  I  R  R  S  S  S  S  S  S  S  S  S  S  S   09/25/24 Blood from Peripheral, Antecubital, Right No growth after 5 days.                 09/25/24 Blood from Peripheral, Forearm, Right No growth after 5 days.                 09/24/24 Incision site from Groin Escherichia coli  R  R  R  S  R  S  S  S  S  S  S  R  S  S   09/24/24 Blood No growth after 5 days.                 09/24/24 Blood No growth after 5 days.                 09/17/24 Blood from Peripheral, Antecubital, Right No growth after 5 days.                 09/17/24 Blood from Peripheral, Antecubital, Right No growth after 5 days.                     LAST 7 DAYS MICROBIOLOGY   Microbiology Results (last 7 days)       Procedure Component Value Units Date/Time    Blood culture x two cultures. Draw prior to antibiotics. [[id number]] Collected: 10/25/24 1748    Order Status: Completed Specimen: Blood from Peripheral, Antecubital, Right Updated: 10/28/24 2032     Blood Culture, Routine No Growth to date      No Growth to date      No Growth to date      No Growth to date    Narrative:      Aerobic and anaerobic  Collection has been rescheduled by [facility] at 10/25/2024 16:42 Reason:   Nurse tomasz request to come back  Collection has been rescheduled by [email or web address] at 10/25/2024 17:19 Reason:   Patient has fistula must wait 15 mins  Collection has been rescheduled by [facility] at 10/25/2024 16:42 Reason:   Nurse tomasz request to come back  Collection has been rescheduled by [email or web address] at 10/25/2024 17:19 Reason:   Patient has fistula must wait 15 mins    Blood culture x two cultures. Draw prior to antibiotics. [[id number]] Collected: 10/25/24 1719    Order Status: Completed Specimen: Blood from Peripheral, Antecubital, Right Updated: 10/28/24 1832     Blood Culture, Routine No Growth to date      No Growth to date      No Growth to  date      No Growth to date    Narrative:      Aerobic and anaerobic  Collection has been rescheduled by BNI at 10/25/2024 16:42 Reason:   Nurse tomasz request to come back  Collection has been rescheduled by [facility] at 10/25/2024 16:42 Reason:   Nurse tomasz request to come back    Aerobic culture [[id number]]  (Abnormal)  (Susceptibility) Collected: 10/25/24 1932    Order Status: Completed Specimen: Incision site from Abdomen Updated: 10/28/24 0640     Aerobic Bacterial Culture ESCHERICHIA COLI  Moderate        PSEUDOMONAS AERUGINOSA  Many              CURRENT/PREVIOUS VISIT EKG  Results for orders placed or performed during the hospital encounter of 10/25/24   EKG 12-lead    Collection Time: 10/29/24  8:37 AM   Result Value Ref Range    QRS Duration 98 ms    [facility] QTC Calculation 468 ms    Narrative    Test Reason : Z91.89,    Vent. Rate : 085 BPM     Atrial Rate : 085 BPM     P-R Int : 160 ms          QRS Dur : 098 ms      QT Int : 394 ms       P-R-T Axes : 030 -59 071 degrees     QTc Int : 468 ms    Normal sinus rhythm  Left anterior fascicular block  Abnormal ECG  When compared with ECG of 25-OCT-2024 16:35,  Incomplete right bundle branch block is no longer Present    Referred By: [email or web address]   SELF           Confirmed By:        Significant Imaging: I have reviewed all relevant and available imaging results/findings within the past 24 hours.    I spent a total of 50 minutes on the day of the visit.This includes face to face time and non-face to face time preparing to see the patient (eg, review of tests), obtaining and/or reviewing separately obtained history, documenting clinical information in the electronic or other health record, independently interpreting results and communicating results to the patient/family/caregiver, or care coordinator.    Kerwin Au MD  Date of Service: 10/29/2024      This note was created using [employer] voice recognition software that occasionally misinterpreted phrases or words.

## 2024-10-29 NOTE — DISCHARGE SUMMARY
On license of [facility] Medicine  Discharge Summary      Patient Name: João Ham  MRN: [id number]  EDIE: [id number]  Patient Class: IP- Inpatient  Admission Date: 10/25/2024  Hospital Length of Stay: 1 days  Discharge Date and Time: 10/29/2024  4:45 PM  Attending Physician: Narda Rivera MD   Discharging Provider: Kat French NP  Primary Care Provider: Dawson Granado MD    Primary Care Team: Networked reference to record PCT     HPI:   44 year old pt getting admitted with suspected intra abdominal infection and draining wound in abdomen  This pt is HD dependent for ESRD and was in this hospital last month  Last month he got admitted with colonic perforation and abscess   He underwent laparotomy/colectomy and eventually colostomy  He went home 96 hrs ago  HH nurse noticed increased drainage from wound area and asked him to come to ER  He is on Eliquis which pt doesn't know why he is taking that  Denied fever but c/o on and off abdominal pain   In the ER today he was hypotensive and tachycardic     * No surgery found *      Hospital Course:   João Ham was monitored closely during his hospitalization.  CT abdomen and pelvis performed showed postsurgical changes in the left inguinal canal with associated infectious/inflammatory fluid and edema and questionable small abscess consistent with prior CT. He was hypotensive on presentation which improved with IVF.  He was placed on IV abx.  General surgery was consulted, and recommended no surgical intervention.  Patient to continue Dialysis MWF. Aerobic cultures +Pseudomonas and E. Coli sensitive to Merrem, Vancomycin discontinued.  Infectious Disease was consulted change the patient to Cipro times 10 days with outpatient follow up in 2 weeks.  Wound care was performed prior to patient being discharged, and was educated by wound care nurse on dressing changes and ostomy care.  Patient is a follow with wound care clinic on 10/31/2024.   Referral to home health agencies were sent by case management, they will call him pending acceptance.  Case management following for discharge planning.    Patient seen and examined on day of discharge.  Patient in no acute distress.  Patient deemed appropriate for discharge, in his in agreeance.  Patient was educated on discharge planning, he verbalized understanding.  Patient is to follow up with discharge clinic.  Medications were sent to patient's preferred pharmacy.  Patient is safely discharged home.     Goals of Care Treatment Preferences:  Code Status: Full Code      SDOH Screening:  The patient was screened for utility difficulties, food insecurity, transport difficulties, housing insecurity, and interpersonal safety and there were no concerns identified this admission.     Consults:   Consults (From admission, onward)          Status Ordering Provider     Inpatient consult to Infectious Diseases  Once        Provider:  Kerwin Au MD    Completed JOSELYN AGUILAR     Inpatient consult to Nephrology  Once        Provider:  Jeremy Madrid MD    Completed EUGENE HUNG new Assessment & Plan notes have been filed under this hospital service since the last note was generated.  Service: Hospital Medicine    Final Active Diagnoses:    Diagnosis Date Noted POA    PRINCIPAL PROBLEM:  Postoperative wound breakdown [T81.31XA] 10/25/2024 Yes    Anticoagulated [Z79.01] 10/25/2024 Not Applicable    History of ESBL E. coli infection [Z86.19] 10/25/2024 Yes    Sepsis [A41.9] 09/25/2024 Yes    Essential hypertension [I10] 07/17/2024 Yes    ESRD (end stage renal disease) [N18.6] 10/10/2022 Yes      Problems Resolved During this Admission:    Diagnosis Date Noted Date Resolved POA    Hypotension [I95.9] 10/25/2024 10/28/2024 Yes       Discharged Condition: stable    Disposition: Home-Health Care [facility]    Follow Up:   Follow-up Information       Kerwin Au MD Follow up in 2 week(s).     Specialty: Infectious Diseases  Why: Office will contact patient to schedule.  Contact information:  [address] [place] [address]  [phone]               James Recinos, DO Follow up on 10/31/2024.    Specialty: Wound Care  Why: @ 1:30 pm  Contact information:  [address]  [place] [address]  [phone]               [facility]. Go on 11/5/2024.    Specialty: Primary Care  Why: Hospital follow up scheduled at 4:00 p.m. on 11/5.  Contact information:  [address]  [facility] [address] [phone]  Additional information:  [address], [facility] [place] [facility] Follow up.    Specialty: Home Health Services  Why: Office will contact patient to schedule home visits.  Contact information:  [address] [facility]  [address]  [place] [address]  [phone]                           Patient Instructions:      Ambulatory referral/consult to Outpatient Case Management   Referral Priority: Routine Referral Type: Consultation   Referral Reason: Specialty Services Required   Number of Visits Requested: 1     Ambulatory referral/consult to Home Health   Standing Status: Future   Referral Priority: Routine Referral Type: Home Health   Referral Reason: Specialty Services Required   Requested Specialty: Home Health Services   Number of Visits Requested: 1     SUBSEQUENT HOME HEALTH ORDERS     Order Specific Question Answer Comments   What Home Health Agency is the patient currently using? Other/External        Significant Diagnostic Studies: Labs: CMP   Recent Labs   Lab 10/28/24  0441 10/29/24  0649    138   K 4.7 4.7   CL 99 101   CO2 28 27   GLU 85 94   BUN 30* 26*   CREATININE 14.5* 11.6*   CALCIUM 8.9 9.0   ALBUMIN 3.5 3.6   ANIONGAP 10 10   , CBC   Recent Labs   Lab 10/28/24  0441 10/29/24  0649   WBC 5.78 5.94   HGB 8.6* 8.9*   HCT 27.5* 29.1*    443   , and All labs within the past 24 hours have been reviewed    Pending Diagnostic Studies:       None            Microbiology Results (last 7 days)       Procedure Component Value Units Date/Time    Blood culture x two cultures. Draw prior to antibiotics. [[id number]] Collected: 10/25/24 1748    Order Status: Completed Specimen: Blood from Peripheral, Antecubital, Right Updated: 10/28/24 2032     Blood Culture, Routine No Growth to date      No Growth to date      No Growth to date      No Growth to date    Narrative:      Aerobic and anaerobic  Collection has been rescheduled by BNI at 10/25/2024 16:42 Reason:   Nurse tomasz request to come back  Collection has been rescheduled by [email or web address] at 10/25/2024 17:19 Reason:   Patient has fistula must wait 15 mins  Collection has been rescheduled by BNI at 10/25/2024 16:42 Reason:   Nurse tomasz request to come back  Collection has been rescheduled by ZJ1 at 10/25/2024 17:19 Reason:   Patient has fistula must wait 15 mins    Blood culture x two cultures. Draw prior to antibiotics. [[id number]] Collected: 10/25/24 1719    Order Status: Completed Specimen: Blood from Peripheral, Antecubital, Right Updated: 10/28/24 1832     Blood Culture, Routine No Growth to date      No Growth to date      No Growth to date      No Growth to date    Narrative:      Aerobic and anaerobic  Collection has been rescheduled by BNI at 10/25/2024 16:42 Reason:   Nurse tomasz request to come back  Collection has been rescheduled by BNI at 10/25/2024 16:42 Reason:   Nurse tomasz request to come back    Aerobic culture [[id number]]  (Abnormal)  (Susceptibility) Collected: 10/25/24 1932    Order Status: Completed Specimen: Incision site from Abdomen Updated: 10/28/24 0640     Aerobic Bacterial Culture ESCHERICHIA COLI  Moderate        PSEUDOMONAS AERUGINOSA  Many             Medications:  Reconciled Home Medications:      Medication List        START taking these medications      ciprofloxacin HCl 500 MG tablet  Commonly known as: CIPRO  Take 1 tablet (500 mg total) by mouth every evening. Take after  dialysis on days of dialysis for 7 days     docusate sodium 100 MG capsule  Commonly known as: COLACE  Take 1 capsule (100 mg total) by mouth 2 (two) times daily. for 14 days     famotidine 20 MG tablet  Commonly known as: PEPCID  Take 1 tablet (20 mg total) by mouth once daily. for 7 days     Lactobacillus acidophilus 1 billion cell Cap  Take 1 capsule by mouth once daily. for 7 days     mupirocin 2 % ointment  Commonly known as: BACTROBAN  by Nasal route 2 (two) times daily. for 4 days            CHANGE how you take these medications      isosorbide mononitrate 120 MG 24 hr tablet  Commonly known as: IMDUR  Take 120 mg by mouth every morning.  What changed: Another medication with the same name was removed. Continue taking this medication, and follow the directions you see here.  Notes to patient: You do not require this medication.  Please follow up with prescribing regarding continuation of medication            CONTINUE taking these medications      aluminum-magnesium hydroxide-simethicone 200-200-20 mg/5 mL Susp  Commonly known as: MAALOX  Take 30 mLs by mouth 4 (four) times daily as needed.     apixaban 2.5 mg Tab  Commonly known as: ELIQUIS  Take 1 tablet (2.5 mg total) by mouth 2 (two) times daily.     calcitRIOL 0.25 MCG Cap  Commonly known as: ROCALTROL  Take 1 capsule (0.25 mcg total) by mouth once daily.     calcium carbonate 200 mg calcium (500 mg) chewable tablet  Commonly known as: TUMS  Take 2 tablets (1,000 mg total) by mouth 3 (three) times daily.     cholecalciferol (vitamin D3) 125 mcg (5,000 unit) Tab  Take 5,000 Units by mouth every morning.     cloNIDine 0.3 MG tablet  Commonly known as: CATAPRES  Take 1 tablet (0.3 mg total) by mouth 3 (three) times daily as needed (SBP > 150).     epoetin mily-epbx 20,000 unit/mL injection  Commonly known as: RETACRIT  Inject 0.67 mLs (13,400 Units total) into the skin every Mon, Wed, Fri.     * heparin (porcine) 1,000 unit/mL injection  Inject 4 mLs (4,000  Units total) into the vein as needed (line care after dialysis).     * heparin (porcine) 5,000 unit/mL injection  Inject 1 mL (5,000 Units total) into the skin every 8 (eight) hours.     hydrALAZINE 100 MG tablet  Commonly known as: APRESOLINE  Take 1 tablet (100 mg total) by mouth every 8 (eight) hours. Hold for SBP < 120 and before dialysis     LIDOcaine-prilocaine cream  Commonly known as: EMLA  Apply topically as needed (pre dialysis).     metoprolol succinate 50 MG 24 hr tablet  Commonly known as: TOPROL-XL  Take 150 mg by mouth once daily.     olmesartan 40 MG tablet  Commonly known as: BENICAR  Take 1 tablet (40 mg total) by mouth once daily.  Notes to patient: You did not require this medication during hospitalization.  Please follow with prescribing provider prior to resuming this medication     oxyCODONE 20 mg Tab immediate release tablet  Commonly known as: ROXICODONE  Take 1 tablet by mouth every 12 (twelve) hours.     sevelamer carbonate 800 mg Tab  Commonly known as: RENVELA  Take 2 tablets (1,600 mg total) by mouth 3 (three) times daily with meals.           * This list has 2 medication(s) that are the same as other medications prescribed for you. Read the directions carefully, and ask your doctor or other care provider to review them with you.                STOP taking these medications      simethicone 80 MG chewable tablet  Commonly known as: MYLICON            ASK your doctor about these medications      acetaminophen 325 MG tablet  Commonly known as: TYLENOL  Take 2 tablets (650 mg total) by mouth every 4 (four) hours as needed.     ALPRAZolam 0.25 MG tablet  Commonly known as: XANAX  Take 1 tablet (0.25 mg total) by mouth nightly as needed for Insomnia.     gabapentin 100 MG capsule  Commonly known as: NEURONTIN  Take 1 capsule (100 mg total) by mouth 3 (three) times daily.     HYDROmorphone 4 MG tablet  Commonly known as: DILAUDID  Take 1 tablet (4 mg total) by mouth every 4 (four) hours as  needed for Pain.     insulin aspart U-100 100 unit/mL (3 mL) Inpn pen  Commonly known as: NovoLOG  Inject 0-5 Units into the skin before meals and at bedtime as needed (Hyperglycemia).     ketoconazole 2 % shampoo  Commonly known as: NIZORAL  Apply topically every other day.     melatonin 3 mg tablet  Commonly known as: MELATIN  Take 2 tablets (6 mg total) by mouth nightly as needed for Insomnia.     miconazole 2 % cream  Commonly known as: MICOTIN  Apply topically 2 (two) times daily.     ondansetron 4 MG Tbdl  Commonly known as: ZOFRAN-ODT  Take 1 tablet (4 mg total) by mouth every 6 (six) hours as needed (nausea).     oxymetazoline 0.05 % nasal spray  Commonly known as: AFRIN  1 spray by Nasal route 2 (two) times daily.              Indwelling Lines/Drains at time of discharge:   Lines/Drains/Airways       Drain  Duration                  Hemodialysis AV Fistula Left forearm -- days         Colostomy 09/25/24 1200 LLQ 34 days                    Time spent on the discharge of patient: 35 minutes         Kat French NP  Department of Hospital Medicine  [facility]

## 2024-10-29 NOTE — PLAN OF CARE
Patient cleared for discharge by case management to home with home health.    Patient drove here and plans to drive himself home.       10/29/24 1349   Final Note   Assessment Type Final Discharge Note   Anticipated Discharge Disposition Home-Health   Hospital Resources/Appts/Education Provided Appointments scheduled and added to AVS

## 2024-10-29 NOTE — PLAN OF CARE
Resumption of HH orders sent to [facility] via [employer].    [facility] unable to accept Pt back. HH referral sent via [employer]     10/29/24 0854   Post-Acute Status   Post-Acute Authorization Home Health   Home Health Status Set-up Complete/Auth obtained   Discharge Plan   Discharge Plan A Home Health   Discharge Plan B [facility]

## 2024-10-29 NOTE — PROGRESS NOTES
Nephrology Progress Note        Patient Name: João Ham  MRN: [id number]    Patient Class: IP- Inpatient   Admission Date: 10/25/2024  Length of Stay: 1 days  Date of Service: 10/29/2024    Attending Physician: Narda Rivera MD  Primary Care Provider: Dawson Granado MD    Reason for Consult: esrd    SUBJECTIVE:     HPI: 44M with ESRD on HD MWF and recent admission for partial colectomy, complicated by failure of anastomosis with re-operation and colostomy + sepsis, sent by primary care provider to the emergency department for admission. Home health nurse noticed a change in the surgical wound and patient noticed more drainage. Patient's blood work is close to baseline. Patient denies any fever, chills, nausea vomiting. Patient however is very hypotensive and tachycardic and has slight pain at the incision site.     10/27 VSS, no new complains. BP better. Resume HD per schedule.  10/28  lab results reviewed.  Pressures high this am, has HD scheduled today, UF as tolerated.  No new complaints.  10/29 VSS. HD tomorrow. Appreciate ID and wound care input. Do not add ARB due to hypotension, will add as needed as outpatient, prefer to control BP with UF with dialysis, otherwise we can not remove fluid due to intra-dyalitic hypotension...    ASSESSMENT/PLAN:     ESRD on HD MWF via AVF  Hyponatremia  Next dialysis per schedule or PRN.  Continue current dialysis prescription and adjust as needed.  Renal diet - low K, low phos as tolerated.  No IVs or BP checks on access and/or non-dominant arm.    Anemia of CKD  Hgb and HCT are acceptable. Monitor for now.  Will provide LOR and/or IV iron as needed to keep Hgb 8-10 range.    MBD / Secondary HPT  Ca, Phos, PTH and vitamin D levels are acceptable.   Phos binders, vitamin D and analogues, calcimimetics will be given as needed.    Hypotension  HTN  Tolerate asymptomatic HTN up to -160.  Do not add ARB due to hypotension, will add as needed as outpatient,  "prefer to control BP with UF with dialysis, otherwise we can not remove fluid due to intra-dyalitic hypotension...  Appreciate ID and wound care input..    Thank you for allowing us to participate in the care of your patient!   We will follow the patient and provide recommendations as needed.         Laboratory:  Recent Labs   Lab 10/27/24  0836 10/28/24  0441 10/29/24  0649    137 138   K 4.3 4.7 4.7   CL 96 99 101   CO2 28 28 27   BUN 24* 30* 26*   CREATININE 12.1* 14.5* 11.6*    85 94       Recent Labs   Lab 10/27/24  0836 10/28/24  0441 10/29/24  0649   CALCIUM 8.8 8.9 9.0   ALBUMIN 3.6 3.5 3.6   PHOS 4.9* 5.9* 5.5*   MG 1.8 1.8 1.9       Recent Labs   Lab 02/02/23  0745 05/19/23  1022 06/19/23  1031   PTH, Intact 225.6 H 335.8 H 319.0 H       No results for input(s): "POCTGLUCOSE" in the last 168 hours.    Recent Labs   Lab 10/10/22  2005 06/22/23  0446 07/16/24  0510   Hemoglobin A1C 5.5 5.5 6.1       Recent Labs   Lab 10/27/24  0836 10/28/24  0441 10/29/24  0649   WBC 6.46 5.78 5.94   HGB 8.5* 8.6* 8.9*   HCT 27.0* 27.5* 29.1*    422 443   * 100* 100*   MCHC 31.5* 31.3* 30.6*   MONO 14.4  0.9 13.7  0.8 16.7*  1.0   EOSINOPHIL 7.0 5.4 4.0       Recent Labs   Lab 10/25/24  1710 10/26/24  0943 10/27/24  0836 10/28/24  0441 10/29/24  0649   BILITOT 0.5 0.4 0.4  --   --    PROT 7.7 7.0 7.2  --   --    ALBUMIN 3.8 3.6 3.6 3.5 3.6   ALKPHOS 87 78 77  --   --    ALT 57* 43 36  --   --    AST 46* 34 25  --   --        Recent Labs   Lab 10/10/22  1645 01/09/23  1123 06/19/23  1622 07/17/24  0505 09/19/24  1435   Color, UA Yellow   < > Straw Yellow Yellow   Appearance, UA Clear   < > Clear Hazy A Clear   pH, UA 6.0   < > 6.0 6.0 8.0   Specific Gravity, UA 1.025   < > 1.010 1.020 1.010   Protein, UA 2+ A   < > 2+ A 3+ A 2+ A   Glucose, UA Negative   < > Negative Negative Negative   Ketones, UA Negative   < > Negative Negative Negative   Urobilinogen, UA Negative  --   --  Negative Negative "   Bilirubin (UA) Negative   < > Negative Negative Negative   Occult Blood UA 1+ A   < > Negative 2+ A 2+ A   Nitrite, UA Negative   < > Negative Negative Negative   RBC, UA 1   < > 0 6 H 4   WBC, UA 0   < > 2 52 H 21 H   Bacteria None   < > None None None   Hyaline Casts, UA 0   < > 0 0 0    < > = values in this interval not displayed.       Recent Labs   Lab 09/25/24  1051 10/17/24  1923 10/25/24  1732   POC PH 7.404  --   --    POC PCO2 37.7  --   --    POC HCO3 23.6 L  --   --    POC PO2 87  --   --    POC SATURATED O2 97  --   --    POC BE -1  --   --    Sample ARTERIAL VENOUS VENOUS       Microbiology Results (last 7 days)       Procedure Component Value Units Date/Time    Blood culture x two cultures. Draw prior to antibiotics. [[id number]] Collected: 10/25/24 1748    Order Status: Completed Specimen: Blood from Peripheral, Antecubital, Right Updated: 10/28/24 2032     Blood Culture, Routine No Growth to date      No Growth to date      No Growth to date      No Growth to date    Narrative:      Aerobic and anaerobic  Collection has been rescheduled by BNI at 10/25/2024 16:42 Reason:   Nurse tomasz request to come back  Collection has been rescheduled by [email or web address] at 10/25/2024 17:19 Reason:   Patient has fistula must wait 15 mins  Collection has been rescheduled by BNI at 10/25/2024 16:42 Reason:   Nurse tomasz request to come back  Collection has been rescheduled by [email or web address] at 10/25/2024 17:19 Reason:   Patient has fistula must wait 15 mins    Blood culture x two cultures. Draw prior to antibiotics. [[id number]] Collected: 10/25/24 1719    Order Status: Completed Specimen: Blood from Peripheral, Antecubital, Right Updated: 10/28/24 1832     Blood Culture, Routine No Growth to date      No Growth to date      No Growth to date      No Growth to date    Narrative:      Aerobic and anaerobic  Collection has been rescheduled by BNI at 10/25/2024 16:42 Reason:   Nurse tomasz request to come back  Collection has been  rescheduled by BNI at 10/25/2024 16:42 Reason:   Nurse tomasz request to come back    Aerobic culture [[id number]]  (Abnormal)  (Susceptibility) Collected: 10/25/24 1932    Order Status: Completed Specimen: Incision site from Abdomen Updated: 10/28/24 0640     Aerobic Bacterial Culture ESCHERICHIA COLI  Moderate        PSEUDOMONAS AERUGINOSA  Many              Review of patient's allergies indicates:   Allergen Reactions    Mushroom Swelling     Tongue swells    Tongue swells       Tongue swells       Outpatient meds:  No current facility-administered medications on file prior to encounter.     Current Outpatient Medications on File Prior to Encounter   Medication Sig Dispense Refill    aluminum-magnesium hydroxide-simethicone (MAALOX) 200-200-20 mg/5 mL Susp Take 30 mLs by mouth 4 (four) times daily as needed.      calcitRIOL (ROCALTROL) 0.25 MCG Cap Take 1 capsule (0.25 mcg total) by mouth once daily.      calcium carbonate (TUMS) 200 mg calcium (500 mg) chewable tablet Take 2 tablets (1,000 mg total) by mouth 3 (three) times daily. 180 tablet 11    cholecalciferol, vitamin D3, 125 mcg (5,000 unit) Tab Take 5,000 Units by mouth every morning.      cloNIDine (CATAPRES) 0.3 MG tablet Take 1 tablet (0.3 mg total) by mouth 3 (three) times daily as needed (SBP > 150).      epoetin mily-epbx (RETACRIT) 20,000 unit/mL injection Inject 0.67 mLs (13,400 Units total) into the skin every Mon, Wed, Fri.      hydrALAZINE (APRESOLINE) 100 MG tablet Take 1 tablet (100 mg total) by mouth every 8 (eight) hours. Hold for SBP < 120 and before dialysis      metoprolol succinate (TOPROL-XL) 50 MG 24 hr tablet Take 150 mg by mouth once daily.      olmesartan (BENICAR) 40 MG tablet Take 1 tablet (40 mg total) by mouth once daily. 90 tablet 0    oxyCODONE (ROXICODONE) 20 mg Tab immediate release tablet Take 1 tablet by mouth every 12 (twelve) hours.      sevelamer carbonate (RENVELA) 800 mg Tab Take 2 tablets (1,600 mg total) by mouth 3  (three) times daily with meals. 180 tablet 11    acetaminophen (TYLENOL) 325 MG tablet Take 2 tablets (650 mg total) by mouth every 4 (four) hours as needed. (Patient not taking: Reported on 10/25/2024)      ALPRAZolam (XANAX) 0.25 MG tablet Take 1 tablet (0.25 mg total) by mouth nightly as needed for Insomnia. (Patient not taking: Reported on 10/25/2024)      apixaban (ELIQUIS) 2.5 mg Tab Take 2.5 mg by mouth 2 (two) times daily.      gabapentin (NEURONTIN) 100 MG capsule Take 1 capsule (100 mg total) by mouth 3 (three) times daily. (Patient not taking: Reported on 10/25/2024)      heparin sodium,porcine (HEPARIN, PORCINE,) 1,000 unit/mL injection Inject 4 mLs (4,000 Units total) into the vein as needed (line care after dialysis).      heparin sodium,porcine (HEPARIN, PORCINE,) 5,000 unit/mL injection Inject 1 mL (5,000 Units total) into the skin every 8 (eight) hours.      HYDROmorphone (DILAUDID) 4 MG tablet Take 1 tablet (4 mg total) by mouth every 4 (four) hours as needed for Pain. (Patient not taking: Reported on 10/25/2024)      insulin aspart U-100 (NOVOLOG) 100 unit/mL (3 mL) InPn pen Inject 0-5 Units into the skin before meals and at bedtime as needed (Hyperglycemia). (Patient not taking: Reported on 10/25/2024)      isosorbide mononitrate (IMDUR) 120 MG 24 hr tablet Take 120 mg by mouth every morning.      isosorbide mononitrate (IMDUR) 30 MG 24 hr tablet Take 1 tablet (30 mg total) by mouth once daily.      ketoconazole (NIZORAL) 2 % shampoo Apply topically every other day. (Patient not taking: Reported on 10/25/2024)      LIDOcaine-prilocaine (EMLA) cream Apply topically as needed (pre dialysis).      melatonin (MELATIN) 3 mg tablet Take 2 tablets (6 mg total) by mouth nightly as needed for Insomnia. (Patient not taking: Reported on 10/25/2024)      miconazole (MICOTIN) 2 % cream Apply topically 2 (two) times daily. (Patient not taking: Reported on 10/25/2024)      ondansetron (ZOFRAN-ODT) 4 MG TbDL  Take 1 tablet (4 mg total) by mouth every 6 (six) hours as needed (nausea). (Patient not taking: Reported on 10/25/2024)      oxymetazoline (AFRIN) 0.05 % nasal spray 1 spray by Nasal route 2 (two) times daily. (Patient not taking: Reported on 10/25/2024)      simethicone (MYLICON) 80 MG chewable tablet Take 1 tablet (80 mg total) by mouth 3 (three) times daily as needed for Flatulence.         Scheduled meds:   apixaban  2.5 mg Oral BID    ciprofloxacin HCl  500 mg Oral QHS    epoetin mily-epbx  10,000 Units Subcutaneous Every Mon, Wed, Fri    famotidine  20 mg Oral Daily    hydrALAZINE  100 mg Oral Q8H    lactobacillus acidophilus & bulgar  1 packet Oral BID    metoprolol succinate  150 mg Oral Daily    mupirocin   Nasal BID    valsartan  320 mg Oral Daily       Infusions:      PRN meds:    Current Facility-Administered Medications:     acetaminophen, 650 mg, Oral, Q8H PRN    acetaminophen, 650 mg, Oral, Q4H PRN    aluminum-magnesium hydroxide-simethicone, 30 mL, Oral, QID PRN    hydrALAZINE, 10 mg, Intravenous, Q6H PRN    HYDROcodone-acetaminophen, 1 tablet, Oral, Q6H PRN    LIDOcaine-prilocaine, , Topical (Top), PRN    melatonin, 6 mg, Oral, Nightly PRN    naloxone, 0.02 mg, Intravenous, PRN    ondansetron, 4 mg, Intravenous, Q6H PRN    polyethylene glycol, 17 g, Oral, BID PRN    Past Medical History:   Diagnosis Date    Allergy     Anemia     Anxiety     CHF (congestive heart failure)     Depression     High blood pressure with chronic kidney disease, stage 5 chronic kidney disease or end stage renal disease     Hypertension      Past Surgical History:   Procedure Laterality Date    ABSCESS DRAINAGE Left 9/17/2024    Procedure: DRAINAGE, ABSCESS, GROIN;  Surgeon: Galileo Connors MD;  Location: [facility] OR;  Service: General;  Laterality: Left;    COLON RESECTION  9/25/2024    Procedure: COLON RESECTION;  Surgeon: Galileo Connors MD;  Location: [facility] OR;  Service: General;;    FISTULOGRAM Bilateral 4/30/2024     Procedure: Fistulogram;  Surgeon: Khoobehi, Ali, MD;  Location: [facility] CATH/EP LAB;  Service: Vascular;  Laterality: Bilateral;    FISTULOGRAM Left 9/24/2024    Procedure: Fistulogram;  Surgeon: Lorraine Salvador MD;  Location: [facility] CATH/EP LAB;  Service: General;  Laterality: Left;    HERNIA REPAIR Right     With mesh    INSERTION, CATHETER, TUNNELED N/A 6/22/2023    Procedure: Insertion,catheter,tunneled;  Surgeon: Everett Caicedo MD;  Location: [facility] OR;  Service: General;  Laterality: N/A;    LAPAROTOMY, EXPLORATORY N/A 9/25/2024    Procedure: LAPAROTOMY, EXPLORATORY;  Surgeon: Galileo Connors MD;  Location: [facility] OR;  Service: General;  Laterality: N/A;    MOBILIZATION OF SPLENIC FLEXURE  9/25/2024    Procedure: MOBILIZATION, SPLENIC FLEXURE;  Surgeon: Galileo Connors MD;  Location: [facility];  Service: General;;    PERCUTANEOUS TRANSLUMINAL ANGIOPLASTY OF ARTERIOVENOUS FISTULA Left 4/30/2024    Procedure: PTA, AV FISTULA;  Surgeon: Khoobehi, Ali, MD;  Location: [facility] CATH/EP LAB;  Service: Vascular;  Laterality: Left;    PERCUTANEOUS TRANSLUMINAL ANGIOPLASTY OF ARTERIOVENOUS FISTULA Left 9/24/2024    Procedure: PTA, AV FISTULA;  Surgeon: Lorraine Salvador MD;  Location: [facility] CATH/EP LAB;  Service: General;  Laterality: Left;    PHLEBOGRAPHY N/A 7/19/2024    Procedure: Venogram;  Surgeon: Khoobehi, Ali, MD;  Location: [facility] CATH/EP LAB;  Service: Vascular;  Laterality: N/A;    REMOVAL, TUNNELED CATH Right 7/19/2024    Procedure: REMOVAL, TUNNELED CATH;  Surgeon: Khoobehi, Ali, MD;  Location: [facility] CATH/EP LAB;  Service: Vascular;  Laterality: Right;    ROBOT-ASSISTED LAPAROSCOPIC RESECTION OF SIGMOID COLON USING DA DELANO XI N/A 9/17/2024    Procedure: XI ROBOTIC SIGMOID RESECTION, WITH ANASTAMOSIS;  Surgeon: Galileo Connors MD;  Location: [facility] OR;  Service: General;  Laterality: N/A;  possible open have instruments available     Family History   Problem Relation Name Age of Onset    Hypertension Mother       Social  History     Tobacco Use    Smoking status: Never     Passive exposure: Never    Smokeless tobacco: Never   Substance Use Topics    Alcohol use: Never    Drug use: Never       OBJECTIVE:     Vital Signs and IO:  Temp:  [98 °F (36.7 °C)-98.7 °F (37.1 °C)]   Pulse:  []   Resp:  [17-18]   BP: (115-196)/()   SpO2:  [96 %-100 %]   I/O last 3 completed shifts:  In: 1450 [P.O.:900; Other:500; IV Piggyback:50]  Out: 3825 [Other:3500; Stool:325]  Wt Readings from Last 5 Encounters:   10/26/24 119.6 kg (263 lb 10.7 oz)   10/25/24 116.7 kg (257 lb 4.4 oz)   10/19/24 120.5 kg (265 lb 10.5 oz)   09/18/24 134 kg (295 lb 6.7 oz)   08/29/24 131.3 kg (289 lb 7.4 oz)     Body mass index is 33.85 kg/m².    Physical Exam  Constitutional:       General: Patient is not in acute distress.     Appearance: Patient is well-developed. She is not diaphoretic.   HENT:      Head: Normocephalic and atraumatic.      Mouth/Throat: Mucous membranes are moist.   Eyes:      General: No scleral icterus.     Pupils: Pupils are equal, round, and reactive to light.   Cardiovascular:      Rate and Rhythm: Normal rate and regular rhythm.   Pulmonary:      Effort: Pulmonary effort is normal. No respiratory distress.      Breath sounds: No stridor.   Abdominal:      General: There is no distension.      Palpations: Abdomen is soft.   Musculoskeletal:         General: No deformity. Normal range of motion.      Cervical back: Neck supple.   Skin:     General: Skin is warm and dry.      Findings: No rash present. No erythema.   Neurological:      Mental Status: Patient is alert and oriented to person, place, and time.      Cranial Nerves: No cranial nerve deficit.   Psychiatric:         Behavior: Behavior normal.          Patient care time was spent personally by me on the following activities:     Obtaining a history.  Examination of patient.  Providing medical care at the patients bedside.  Developing a treatment plan with patient or surrogate and  bedside caregivers.  Ordering and reviewing laboratory studies, radiographic studies, pulse oximetry.  Ordering and performing treatments and interventions.  Evaluation of patient's response to treatment.  Discussions with consultants while on the unit and immediately available to the patient.  Re-evaluation of the patient's condition.  Documentation in the medical record.     Jeremy Madrid MD      [facility]  [address] [place] [id number]    [phone] - tel  [phone] - fax    10/29/2024

## 2024-10-29 NOTE — DISCHARGE INSTRUCTIONS
Review attached patient instructions.  Take medications as prescribed.  You were admitted to the hospital for a postop infection.  You were evaluated by General surgery who did not recommend further surgical interventions.  Home health is to follow with you outpatient for dressing changes.  Continue dialysis as scheduled.  Follow with primary care provider to discuss medications listed and determine continuation of medications noted as some medication were not required during hospitalization.  You are to follow up with Infectious Disease, Wound Care, and discharge clinic.  If wound drainage increases, you have a fever of 101.4, or blood pressure drops patient returned to the emergency department for further evaluation and management.    Colostomy Pouch change:  ~Remove current pouch with adhesive remover.  ~clean stoma and chen stomal skin with Saline or Water.  ~apply Stoma powder to Mucocutaneous separation and Pat with Skin barrier or Water. Cover with Thin Hydrocolloid (half of Barrier extender given to Pt).  ~Measure stoma and cut pouch as indicated.  ~Remove backing and place pouch.  May use Barrier extenders as needed to secure pouch.    Wound care to Abdomen and Left Groin:  Abdominal wound:  ~Clean both wounds with wound cleanser, Pat dry.  ~Pack Aquacel Ag rope lightly into abdominal wound.    ~cover wound with  Gauze and Tape or Island dressing.    Left Groin--  ~Clean wounds with wound cleanser, Pat dry  ~Pack Aquacel Ag rope lightly into medial open wound.    ~Cover remaining incision line to cover lateral open wound with Aquacel Ag rope.    ~Cover with Gauze and Tape or Island dressing.

## 2024-10-30 LAB
BACTERIA BLD CULT: NORMAL
BACTERIA BLD CULT: NORMAL

## 2024-10-31 ENCOUNTER — OFFICE VISIT (OUTPATIENT)
Dept: WOUND CARE | Facility: HOSPITAL | Age: 44
End: 2024-10-31
Attending: FAMILY MEDICINE
Payer: COMMERCIAL

## 2024-10-31 VITALS
RESPIRATION RATE: 17 BRPM | HEART RATE: 107 BPM | SYSTOLIC BLOOD PRESSURE: 128 MMHG | TEMPERATURE: 98 F | DIASTOLIC BLOOD PRESSURE: 79 MMHG

## 2024-10-31 DIAGNOSIS — T81.49XA POST-OPERATIVE WOUND ABSCESS: ICD-10-CM

## 2024-10-31 DIAGNOSIS — T81.31XD SURGICAL WOUND DEHISCENCE, SUBSEQUENT ENCOUNTER: Primary | ICD-10-CM

## 2024-10-31 DIAGNOSIS — S31.109D OPEN WOUND OF ABDOMEN, SUBSEQUENT ENCOUNTER: ICD-10-CM

## 2024-10-31 PROCEDURE — 99499 UNLISTED E&M SERVICE: CPT | Mod: ,,, | Performed by: FAMILY MEDICINE

## 2024-10-31 PROCEDURE — 11043 DBRDMT MUSC&/FSCA 1ST 20/<: CPT | Mod: PN | Performed by: FAMILY MEDICINE

## 2024-10-31 PROCEDURE — 99213 OFFICE O/P EST LOW 20 MIN: CPT | Mod: 25,PN | Performed by: FAMILY MEDICINE

## 2024-10-31 PROCEDURE — 11043 DBRDMT MUSC&/FSCA 1ST 20/<: CPT | Mod: ,,, | Performed by: FAMILY MEDICINE

## 2024-11-02 LAB
OHS QRS DURATION: 96 MS
OHS QTC CALCULATION: 507 MS

## 2024-11-04 ENCOUNTER — PATIENT MESSAGE (OUTPATIENT)
Dept: CASE MANAGEMENT | Facility: HOSPITAL | Age: 44
End: 2024-11-04

## 2024-11-04 LAB
OHS QRS DURATION: 98 MS
OHS QTC CALCULATION: 468 MS

## 2024-11-04 PROCEDURE — 86833 HLA CLASS II HIGH DEFIN QUAL: CPT | Mod: TXP | Performed by: NURSE PRACTITIONER

## 2024-11-04 PROCEDURE — 86832 HLA CLASS I HIGH DEFIN QUAL: CPT | Mod: TXP | Performed by: NURSE PRACTITIONER

## 2024-11-05 ENCOUNTER — OFFICE VISIT (OUTPATIENT)
Dept: PRIMARY CARE CLINIC | Facility: CLINIC | Age: 44
End: 2024-11-05
Payer: COMMERCIAL

## 2024-11-05 VITALS
HEART RATE: 95 BPM | HEIGHT: 74 IN | OXYGEN SATURATION: 95 % | TEMPERATURE: 99 F | BODY MASS INDEX: 33.42 KG/M2 | WEIGHT: 260.38 LBS | DIASTOLIC BLOOD PRESSURE: 76 MMHG | SYSTOLIC BLOOD PRESSURE: 110 MMHG

## 2024-11-05 DIAGNOSIS — N18.6 ANEMIA DUE TO CHRONIC KIDNEY DISEASE, ON CHRONIC DIALYSIS: ICD-10-CM

## 2024-11-05 DIAGNOSIS — Z99.2 ANEMIA DUE TO CHRONIC KIDNEY DISEASE, ON CHRONIC DIALYSIS: ICD-10-CM

## 2024-11-05 DIAGNOSIS — N18.6 ESRD ON HEMODIALYSIS: ICD-10-CM

## 2024-11-05 DIAGNOSIS — Z86.19 HISTORY OF ESBL E. COLI INFECTION: ICD-10-CM

## 2024-11-05 DIAGNOSIS — T81.49XA POST-OPERATIVE WOUND ABSCESS: Primary | ICD-10-CM

## 2024-11-05 DIAGNOSIS — Z99.2 ESRD ON HEMODIALYSIS: ICD-10-CM

## 2024-11-05 DIAGNOSIS — D63.1 ANEMIA DUE TO CHRONIC KIDNEY DISEASE, ON CHRONIC DIALYSIS: ICD-10-CM

## 2024-11-05 PROCEDURE — 99999 PR PBB SHADOW E&M-EST. PATIENT-LVL III: CPT | Mod: PBBFAC,,, | Performed by: NURSE PRACTITIONER

## 2024-11-05 RX ORDER — DICYCLOMINE HYDROCHLORIDE 10 MG/1
10 CAPSULE ORAL 3 TIMES DAILY PRN
Qty: 90 CAPSULE | Refills: 0 | Status: SHIPPED | OUTPATIENT
Start: 2024-11-05

## 2024-11-05 NOTE — PROGRESS NOTES
Subjective:  Chief Complaint   Patient presents with    Hospital Follow Up       History of Present Illness    Transitional Care Note    Family and/or Caretaker present at visit?  No.  Diagnostic tests reviewed/disposition: No diagnosic tests pending after this hospitalization.  Disease/illness education: follow up wound care. S/s of infection/sepsis discussed. Abd cramping. Small frequent meals and hydrate. Med use and avoid constipation.   Home health/community services discussion/referrals: Patient has home health established at [facility] was changed to [facility] .   Establishment or re-establishment of referral orders for community resources:  Resumed Dialysis MWF .   Discussion with other health care providers: No discussion with other health care providers necessary.     This 44 y.o. presents today for complaint of hospital follow-up for post-op wound abscess. Pt saw wound care 10/25/2024. Per their recs: Cleaning with wound  and applying abd pad and paper tape. Aquacell with silver applied. Completing abx Cipro today.   Abd cramping. No pain with eating. No nvd. No stool changes. Passing gas or bm relieves. Notes soft stools.     Review of Systems   Constitutional:  Negative for chills, diaphoresis, fever and weight loss.   Respiratory:  Negative for shortness of breath.    Cardiovascular:  Negative for chest pain.   Gastrointestinal:  Negative for constipation, diarrhea, nausea and vomiting.   Genitourinary:  Negative for dysuria (very little voiding esrd).   Psychiatric/Behavioral:  Positive for depression. Negative for suicidal ideas.        (D/C Summary)  Admission Date: 10/25/2024  Hospital Length of Stay: 1 days  Discharge Date and Time: 10/29/2024  Attending Physician: Narda Rivera MD   Discharging Provider: Kat French NP  Primary Care Provider: Dawson Granado MD     Primary Care Team: Networked reference to record PCT      HPI:   44 year old pt getting admitted with suspected  intra abdominal infection and draining wound in abdomen  This pt is HD dependent for ESRD and was in this hospital last month  Last month he got admitted with colonic perforation and abscess   He underwent laparotomy/colectomy and eventually colostomy  He went home 96 hrs ago  HH nurse noticed increased drainage from wound area and asked him to come to ER  He is on Eliquis which pt doesn't know why he is taking that  Denied fever but c/o on and off abdominal pain   In the ER today he was hypotensive and tachycardic      * No surgery found *       Hospital Course:   João Ham was monitored closely during his hospitalization.  CT abdomen and pelvis performed showed postsurgical changes in the left inguinal canal with associated infectious/inflammatory fluid and edema and questionable small abscess consistent with prior CT. He was hypotensive on presentation which improved with IVF.  He was placed on IV abx.  General surgery was consulted, and recommended no surgical intervention.  Patient to continue Dialysis MWF. Aerobic cultures +Pseudomonas and E. Coli sensitive to Merrem, Vancomycin discontinued.  Infectious Disease was consulted change the patient to Cipro times 10 days with outpatient follow up in 2 weeks.  Wound care was performed prior to patient being discharged, and was educated by wound care nurse on dressing changes and ostomy care.  Patient is a follow with wound care clinic on 10/31/2024.  Referral to home health agencies were sent by case management, they will call him pending acceptance.  Case management following for discharge planning.     Patient seen and examined on day of discharge.  Patient in no acute distress.  Patient deemed appropriate for discharge, in his in agreeance.  Patient was educated on discharge planning, he verbalized understanding.  Patient is to follow up with discharge clinic.  Medications were sent to patient's preferred pharmacy.  Patient is safely discharged home.     "    Objective:    /76 (BP Location: Right arm, Patient Position: Sitting)   Pulse 95   Temp 98.6 °F (37 °C) (Oral)   Ht 6' 2" (1.88 m)   Wt 118.1 kg (260 lb 5.8 oz)   SpO2 95%   BMI 33.43 kg/m²  Body mass index is 33.43 kg/m².    BP Readings from Last 3 Encounters:   11/07/24 (!) 137/97   11/05/24 110/76   10/31/24 128/79       Wt Readings from Last 3 Encounters:   11/05/24 118.1 kg (260 lb 5.8 oz)   10/26/24 119.6 kg (263 lb 10.7 oz)   10/25/24 116.7 kg (257 lb 4.4 oz)       Physical Exam  Vitals reviewed.   Constitutional:       Appearance: Normal appearance.   Cardiovascular:      Rate and Rhythm: Normal rate and regular rhythm.      Pulses: Normal pulses.      Heart sounds: Normal heart sounds.   Pulmonary:      Effort: Pulmonary effort is normal.      Breath sounds: Normal breath sounds.   Abdominal:      General: Abdomen is flat. Bowel sounds are normal. There is no distension.      Palpations: Abdomen is soft.      Tenderness: There is no abdominal tenderness. There is no guarding.   Neurological:      Mental Status: He is alert.   Psychiatric:         Behavior: Behavior normal.         Thought Content: Thought content normal.         Judgment: Judgment normal.      Comments: Flat affect         Assessment/Plan:     Problem List Items Addressed This Visit       Anemia due to chronic kidney disease, on chronic dialysis    Post-operative wound abscess - Primary    ESRD on hemodialysis    History of ESBL E. coli infection       Orders Placed This Encounter    dicyclomine (BENTYL) 10 MG capsule     Anemia of ckd stable and controlled. If transfusion needed do so at dialysis.   Wound abscess abd continue follow up with Dr. Recinos.   Depression no SI or VI. Offered psych services meds but pt deferred.     Follow up in 1 week (on 11/12/2024), or if symptoms worsen or fail to improve.    "

## 2024-11-05 NOTE — PATIENT INSTRUCTIONS
If you are emptying the colostomy collection bag less frequent than usual please discuss with us. This is a sign of constipation. The cramping med may increase constipation. Please call and discuss.

## 2024-11-06 ENCOUNTER — LAB VISIT (OUTPATIENT)
Dept: LAB | Facility: HOSPITAL | Age: 44
End: 2024-11-06
Payer: COMMERCIAL

## 2024-11-06 DIAGNOSIS — Z76.82 ORGAN TRANSPLANT CANDIDATE: ICD-10-CM

## 2024-11-07 ENCOUNTER — OFFICE VISIT (OUTPATIENT)
Dept: WOUND CARE | Facility: HOSPITAL | Age: 44
End: 2024-11-07
Attending: FAMILY MEDICINE
Payer: COMMERCIAL

## 2024-11-07 VITALS
RESPIRATION RATE: 20 BRPM | SYSTOLIC BLOOD PRESSURE: 137 MMHG | DIASTOLIC BLOOD PRESSURE: 97 MMHG | HEART RATE: 105 BPM | TEMPERATURE: 98 F

## 2024-11-07 DIAGNOSIS — T81.31XD SURGICAL WOUND DEHISCENCE, SUBSEQUENT ENCOUNTER: ICD-10-CM

## 2024-11-07 DIAGNOSIS — S31.109D OPEN WOUND OF ABDOMEN, SUBSEQUENT ENCOUNTER: ICD-10-CM

## 2024-11-07 DIAGNOSIS — T81.49XA POST-OPERATIVE WOUND ABSCESS: Primary | ICD-10-CM

## 2024-11-07 PROCEDURE — 11043 DBRDMT MUSC&/FSCA 1ST 20/<: CPT | Mod: PN | Performed by: FAMILY MEDICINE

## 2024-11-07 PROCEDURE — 17250 CHEM CAUT OF GRANLTJ TISSUE: CPT | Mod: PN | Performed by: FAMILY MEDICINE

## 2024-11-07 PROCEDURE — 97605 NEG PRS WND THER DME<=50SQCM: CPT | Mod: PN | Performed by: FAMILY MEDICINE

## 2024-11-07 NOTE — PROGRESS NOTES
Wound Care Progress Note    Subjective:       Patient ID: João Ham is a 44 y.o. male.    Chief Complaint: No chief complaint on file.    HPI  Pt seen in clinic for a FU visit for an open abdominal surgical wound and a left groin open surgical wound. Pt wound is improved from last visit. Pt has no new complaints today  Review of Systems   Skin:  Positive for wound.        Abdomen and left groin open surgical wounds   All other systems reviewed and are negative.      Objective:        Physical Exam  Vitals and nursing note reviewed.   Constitutional:       General: He is not in acute distress.     Appearance: Normal appearance.   Skin:     General: Skin is warm and dry.      Findings: Lesion present.      Comments: Open surgical wounds of the groin and abdomen, see wound care assessment documentation in chart review scanned under the media tab   Neurological:      General: No focal deficit present.      Mental Status: He is alert.   Psychiatric:         Judgment: Judgment normal.       There were no vitals filed for this visit.    Assessment:           ICD-10-CM ICD-9-CM   1. Post-operative wound abscess  T81.49XA 998.59   2. Surgical wound dehiscence, subsequent encounter  T81.31XD V58.89     998.32   3. Open wound of abdomen, subsequent encounter  S31.109D V58.89     879.2                Plan:                  Diagnoses and all orders for this visit:    Post-operative wound abscess    Surgical wound dehiscence, subsequent encounter    Open wound of abdomen, subsequent encounter      See wound care instructions provided separately

## 2024-11-13 ENCOUNTER — PATIENT MESSAGE (OUTPATIENT)
Dept: CASE MANAGEMENT | Facility: HOSPITAL | Age: 44
End: 2024-11-13

## 2024-11-14 ENCOUNTER — OFFICE VISIT (OUTPATIENT)
Dept: WOUND CARE | Facility: HOSPITAL | Age: 44
End: 2024-11-14
Attending: FAMILY MEDICINE
Payer: COMMERCIAL

## 2024-11-14 ENCOUNTER — OFFICE VISIT (OUTPATIENT)
Dept: INFECTIOUS DISEASES | Facility: CLINIC | Age: 44
End: 2024-11-14
Payer: COMMERCIAL

## 2024-11-14 ENCOUNTER — OFFICE VISIT (OUTPATIENT)
Facility: CLINIC | Age: 44
End: 2024-11-14
Payer: COMMERCIAL

## 2024-11-14 VITALS
RESPIRATION RATE: 20 BRPM | HEART RATE: 100 BPM | SYSTOLIC BLOOD PRESSURE: 144 MMHG | TEMPERATURE: 99 F | DIASTOLIC BLOOD PRESSURE: 100 MMHG

## 2024-11-14 VITALS
HEIGHT: 74 IN | SYSTOLIC BLOOD PRESSURE: 130 MMHG | WEIGHT: 256.19 LBS | TEMPERATURE: 99 F | BODY MASS INDEX: 32.88 KG/M2 | DIASTOLIC BLOOD PRESSURE: 90 MMHG | HEART RATE: 99 BPM | OXYGEN SATURATION: 97 %

## 2024-11-14 VITALS
OXYGEN SATURATION: 99 % | DIASTOLIC BLOOD PRESSURE: 96 MMHG | WEIGHT: 254.38 LBS | HEART RATE: 98 BPM | TEMPERATURE: 99 F | BODY MASS INDEX: 32.64 KG/M2 | SYSTOLIC BLOOD PRESSURE: 150 MMHG | HEIGHT: 74 IN

## 2024-11-14 DIAGNOSIS — T81.49XA POST-OPERATIVE WOUND ABSCESS: ICD-10-CM

## 2024-11-14 DIAGNOSIS — S31.109D OPEN WOUND OF ABDOMEN, SUBSEQUENT ENCOUNTER: Primary | ICD-10-CM

## 2024-11-14 DIAGNOSIS — T81.49XA POST-OPERATIVE WOUND ABSCESS: Primary | ICD-10-CM

## 2024-11-14 DIAGNOSIS — S31.109D OPEN WOUND OF ABDOMEN, SUBSEQUENT ENCOUNTER: ICD-10-CM

## 2024-11-14 DIAGNOSIS — Z99.2 ESRD ON HEMODIALYSIS: ICD-10-CM

## 2024-11-14 DIAGNOSIS — Z90.49 S/P LAPAROSCOPIC-ASSISTED SIGMOIDECTOMY: ICD-10-CM

## 2024-11-14 DIAGNOSIS — T81.31XD SURGICAL WOUND DEHISCENCE, SUBSEQUENT ENCOUNTER: ICD-10-CM

## 2024-11-14 DIAGNOSIS — N18.6 ESRD ON HEMODIALYSIS: ICD-10-CM

## 2024-11-14 DIAGNOSIS — Z09 HOSPITAL DISCHARGE FOLLOW-UP: ICD-10-CM

## 2024-11-14 PROBLEM — S31.109A OPEN WOUND OF ABDOMEN: Status: ACTIVE | Noted: 2024-11-14

## 2024-11-14 LAB — HPRA INTERPRETATION: NORMAL

## 2024-11-14 PROCEDURE — 99213 OFFICE O/P EST LOW 20 MIN: CPT | Mod: S$GLB,,, | Performed by: NURSE PRACTITIONER

## 2024-11-14 PROCEDURE — 3008F BODY MASS INDEX DOCD: CPT | Mod: CPTII,S$GLB,, | Performed by: NURSE PRACTITIONER

## 2024-11-14 PROCEDURE — 4010F ACE/ARB THERAPY RXD/TAKEN: CPT | Mod: CPTII,S$GLB,, | Performed by: NURSE PRACTITIONER

## 2024-11-14 PROCEDURE — 97605 NEG PRS WND THER DME<=50SQCM: CPT | Mod: PN | Performed by: FAMILY MEDICINE

## 2024-11-14 PROCEDURE — 99999 PR PBB SHADOW E&M-EST. PATIENT-LVL V: CPT | Mod: PBBFAC,,, | Performed by: NURSE PRACTITIONER

## 2024-11-14 PROCEDURE — 1159F MED LIST DOCD IN RCRD: CPT | Mod: CPTII,S$GLB,, | Performed by: NURSE PRACTITIONER

## 2024-11-14 PROCEDURE — 99999 PR PBB SHADOW E&M-EST. PATIENT-LVL IV: CPT | Mod: PBBFAC,TXP,, | Performed by: INTERNAL MEDICINE

## 2024-11-14 PROCEDURE — 11043 DBRDMT MUSC&/FSCA 1ST 20/<: CPT | Mod: PN | Performed by: FAMILY MEDICINE

## 2024-11-14 PROCEDURE — 11043 DBRDMT MUSC&/FSCA 1ST 20/<: CPT | Mod: ,,, | Performed by: FAMILY MEDICINE

## 2024-11-14 PROCEDURE — 3075F SYST BP GE 130 - 139MM HG: CPT | Mod: CPTII,S$GLB,, | Performed by: NURSE PRACTITIONER

## 2024-11-14 PROCEDURE — 99499 UNLISTED E&M SERVICE: CPT | Mod: ,,, | Performed by: FAMILY MEDICINE

## 2024-11-14 PROCEDURE — 3044F HG A1C LEVEL LT 7.0%: CPT | Mod: CPTII,S$GLB,, | Performed by: NURSE PRACTITIONER

## 2024-11-14 PROCEDURE — 1111F DSCHRG MED/CURRENT MED MERGE: CPT | Mod: CPTII,S$GLB,, | Performed by: NURSE PRACTITIONER

## 2024-11-14 PROCEDURE — 3066F NEPHROPATHY DOC TX: CPT | Mod: CPTII,S$GLB,, | Performed by: NURSE PRACTITIONER

## 2024-11-14 PROCEDURE — 3080F DIAST BP >= 90 MM HG: CPT | Mod: CPTII,S$GLB,, | Performed by: NURSE PRACTITIONER

## 2024-11-14 RX ORDER — LIDOCAINE AND PRILOCAINE 25; 25 MG/G; MG/G
CREAM TOPICAL
Qty: 30 G | Refills: 2 | Status: SHIPPED | OUTPATIENT
Start: 2024-11-14

## 2024-11-14 RX ORDER — HYOSCYAMINE SULFATE 0.12 MG/1
0.12 TABLET SUBLINGUAL EVERY 4 HOURS PRN
Qty: 20 TABLET | Refills: 0 | Status: SHIPPED | OUTPATIENT
Start: 2024-11-14

## 2024-11-14 NOTE — PROGRESS NOTES
Wound Care Progress Note    Subjective:       Patient ID: João Ham is a 44 y.o. male.    Chief Complaint: No chief complaint on file.    HPI  Pt seen in clinic for a FU visit for an open surgical wound of the abdomen. Wound continues to improve, still some necrotic tissue deep ihn the wound base. No new complaints today  Review of Systems   Skin:  Positive for wound.        Open wound abdomen   All other systems reviewed and are negative.      Objective:        Physical Exam  Vitals and nursing note reviewed.   Constitutional:       General: He is not in acute distress.     Appearance: Normal appearance.   Skin:     General: Skin is warm and dry.      Findings: Lesion present.      Comments: Abdomen surgical wound, see wound care assessment documentation in chart review scanned under the media tab   Neurological:      General: No focal deficit present.      Mental Status: He is alert.   Psychiatric:         Mood and Affect: Mood normal.         Judgment: Judgment normal.       There were no vitals filed for this visit.    Assessment:           ICD-10-CM ICD-9-CM   1. Post-operative wound abscess  T81.49XA 998.59   2. Surgical wound dehiscence, subsequent encounter  T81.31XD V58.89     998.32   3. Open wound of abdomen, subsequent encounter  S31.109D V58.89     879.2                Plan:                  Diagnoses and all orders for this visit:    Post-operative wound abscess    Surgical wound dehiscence, subsequent encounter    Open wound of abdomen, subsequent encounter      See wound care instructions provided separately

## 2024-11-14 NOTE — PROGRESS NOTES
Subjective:  Chief Complaint   Patient presents with    Follow-up     Hosp f/u janel       History of Present Illness    Transitional Care Note    Family and/or Caretaker present at visit?  No.  Diagnostic tests reviewed/disposition: No diagnosic tests pending after this hospitalization.  Disease/illness education: follow up wound care. S/s of infection/sepsis discussed. Abd cramping. Small frequent meals and hydrate. Med use and avoid constipation.   Home health/community services discussion/referrals: Patient has home health established at [facility] was changed to [facility] .   Establishment or re-establishment of referral orders for community resources:  Resumed Dialysis MWF .   Discussion with other health care providers: No discussion with other health care providers necessary.     11/14/24. This 44 y.o. presents today for 1 week follow up. He reports he is still with some abdominal cramps, he reports that bentyl does not really help. He is still emptying colostomy bag 3-6 times per day as he was before. He reports that he went to see ID today and his abdominal wound is healing and that his pelvic wound has healed completely.    LOV 11/5/2024. This 44 y.o. presents today for complaint of hospital follow-up for post-op wound abscess. Pt saw wound care 10/25/2024. Per their recs: Cleaning with wound  and applying abd pad and paper tape. Aquacell with silver applied. Completing abx Cipro today.   Abd cramping. No pain with eating. No nvd. No stool changes. Passing gas or bm relieves. Notes soft stools.     Review of Systems   Constitutional:  Negative for chills, diaphoresis, fever and malaise/fatigue.   Respiratory:  Negative for cough and shortness of breath.    Cardiovascular:  Negative for chest pain, palpitations and leg swelling.   Gastrointestinal:  Positive for nausea.        Intermittent nausea not related to eating or drinking. Did have 1 isolated episode of vomiting a few days ago.   Genitourinary:          On HD makes very little urine   Neurological:  Negative for dizziness and headaches.   Psychiatric/Behavioral:  Positive for depression. Negative for suicidal ideas.        (D/C Summary)  Admission Date: 10/25/2024  Hospital Length of Stay: 1 days  Discharge Date and Time: 10/29/2024  Attending Physician: Narda Rivera MD   Discharging Provider: Kat French NP  Primary Care Provider: Dawson Granado MD     Primary Care Team: Networked reference to record PCT      HPI:   44 year old pt getting admitted with suspected intra abdominal infection and draining wound in abdomen  This pt is HD dependent for ESRD and was in this hospital last month  Last month he got admitted with colonic perforation and abscess   He underwent laparotomy/colectomy and eventually colostomy  He went home 96 hrs ago  HH nurse noticed increased drainage from wound area and asked him to come to ER  He is on Eliquis which pt doesn't know why he is taking that  Denied fever but c/o on and off abdominal pain   In the ER today he was hypotensive and tachycardic      * No surgery found *       Hospital Course:   João Ham was monitored closely during his hospitalization.  CT abdomen and pelvis performed showed postsurgical changes in the left inguinal canal with associated infectious/inflammatory fluid and edema and questionable small abscess consistent with prior CT. He was hypotensive on presentation which improved with IVF.  He was placed on IV abx.  General surgery was consulted, and recommended no surgical intervention.  Patient to continue Dialysis MWF. Aerobic cultures +Pseudomonas and E. Coli sensitive to Merrem, Vancomycin discontinued.  Infectious Disease was consulted change the patient to Cipro times 10 days with outpatient follow up in 2 weeks.  Wound care was performed prior to patient being discharged, and was educated by wound care nurse on dressing changes and ostomy care.  Patient is a follow with wound  "care clinic on 10/31/2024.  Referral to home health agencies were sent by case management, they will call him pending acceptance.  Case management following for discharge planning.     Patient seen and examined on day of discharge.  Patient in no acute distress.  Patient deemed appropriate for discharge, in his in agreeance.  Patient was educated on discharge planning, he verbalized understanding.  Patient is to follow up with discharge clinic.  Medications were sent to patient's preferred pharmacy.  Patient is safely discharged home.        Objective:    BP (!) 130/90 (BP Location: Left arm, Patient Position: Sitting)   Pulse 99   Temp 99.4 °F (37.4 °C) (Oral)   Ht 6' 2" (1.88 m)   Wt 116.2 kg (256 lb 3.2 oz)   SpO2 97%   BMI 32.89 kg/m²  Body mass index is 32.89 kg/m².    BP Readings from Last 3 Encounters:   11/14/24 (!) 130/90   11/14/24 (!) 144/100   11/14/24 (!) 150/96       Wt Readings from Last 3 Encounters:   11/14/24 116.2 kg (256 lb 3.2 oz)   11/14/24 115.4 kg (254 lb 6.4 oz)   11/05/24 118.1 kg (260 lb 5.8 oz)       Physical Exam  Vitals reviewed.   Constitutional:       Appearance: Normal appearance.   Cardiovascular:      Rate and Rhythm: Normal rate and regular rhythm.      Pulses: Normal pulses.      Heart sounds: Normal heart sounds.   Pulmonary:      Effort: Pulmonary effort is normal.      Breath sounds: Normal breath sounds.   Abdominal:      General: Abdomen is flat. Bowel sounds are normal. There is no distension.      Palpations: Abdomen is soft.      Tenderness: There is no abdominal tenderness. There is no guarding.   Neurological:      Mental Status: He is alert.   Psychiatric:         Behavior: Behavior normal.         Thought Content: Thought content normal.         Judgment: Judgment normal.      Comments: Flat affect         Assessment/Plan:     Problem List Items Addressed This Visit       Post-operative wound abscess    ESRD on hemodialysis    Open wound of abdomen - Primary "     Other Visit Diagnoses       Hospital discharge follow-up        Relevant Orders    Ambulatory referral/consult to General Surgery    S/P laparoscopic-assisted sigmoidectomy        Relevant Orders    Ambulatory referral/consult to General Surgery            Orders Placed This Encounter    Ambulatory referral/consult to General Surgery    LIDOcaine-prilocaine (EMLA) cream    hyoscyamine (LEVSIN/SL) 0.125 mg Subl     S/P sigmoidectomy needs follow up 2-3 weeks per surgery note during last admit.   Anemia of ckd stable and controlled. If transfusion needed do so at dialysis.   Wound abscess abd continue follow up with Dr. Recinos.   Depression no SI or VI. Offered psych services meds but pt deferred.     No follow-ups on file.    Follow up with surgery.

## 2024-11-14 NOTE — PATIENT INSTRUCTIONS
Abdominal wall wound is healing well.  Not infected at this time   Left groin wound has healed   You will be discharged from Infectious Disease Clinic at this time.  We can see you in the future if needed.

## 2024-11-14 NOTE — PROGRESS NOTES
Subjective:      Reason for consult:  Hospital follow up    HPI: João Ham is a 44 y.o. male with ESRD on hemodialysis.  He incarcerated left inguinal hernia managed by laparotomy.  Later complicated by infection requiring another surgery.  He had a prolonged hospital stay and was discharged home.  Treated again with concern about wound infection.  He improved with wound care and was discharged on ciprofloxacin to complete a 10 day course of therapy but he informs.  Left groin wound has healed.  Lower midline abdominal wound also healing.      Review of patient's allergies indicates:   Allergen Reactions    Mushroom Swelling     Tongue swells    Tongue swells       Tongue swells     Past Medical History:   Diagnosis Date    Allergy     Anemia     Anxiety     CHF (congestive heart failure)     Depression     High blood pressure with chronic kidney disease, stage 5 chronic kidney disease or end stage renal disease     Hypertension      Past Surgical History:   Procedure Laterality Date    ABSCESS DRAINAGE Left 9/17/2024    Procedure: DRAINAGE, ABSCESS, GROIN;  Surgeon: Galileo Connors MD;  Location: [facility] OR;  Service: General;  Laterality: Left;    COLON RESECTION  9/25/2024    Procedure: COLON RESECTION;  Surgeon: Galileo Connors MD;  Location: [facility] OR;  Service: General;;    FISTULOGRAM Bilateral 4/30/2024    Procedure: Fistulogram;  Surgeon: Khoobehi, Ali, MD;  Location: [facility] CATH/EP LAB;  Service: Vascular;  Laterality: Bilateral;    FISTULOGRAM Left 9/24/2024    Procedure: Fistulogram;  Surgeon: Lorraine Salvador MD;  Location: [facility] CATH/EP LAB;  Service: General;  Laterality: Left;    HERNIA REPAIR Right     With mesh    INSERTION, CATHETER, TUNNELED N/A 6/22/2023    Procedure: Insertion,catheter,tunneled;  Surgeon: Everett Caicedo MD;  Location: [facility] OR;  Service: General;  Laterality: N/A;    LAPAROTOMY, EXPLORATORY N/A 9/25/2024    Procedure: LAPAROTOMY, EXPLORATORY;  Surgeon: Galileo Connors MD;   "Location: [facility] OR;  Service: General;  Laterality: N/A;    MOBILIZATION OF SPLENIC FLEXURE  9/25/2024    Procedure: MOBILIZATION, SPLENIC FLEXURE;  Surgeon: Galileo Connors MD;  Location: [facility] OR;  Service: General;;    PERCUTANEOUS TRANSLUMINAL ANGIOPLASTY OF ARTERIOVENOUS FISTULA Left 4/30/2024    Procedure: PTA, AV FISTULA;  Surgeon: Khoobehi, Ali, MD;  Location: [facility] CATH/EP LAB;  Service: Vascular;  Laterality: Left;    PERCUTANEOUS TRANSLUMINAL ANGIOPLASTY OF ARTERIOVENOUS FISTULA Left 9/24/2024    Procedure: PTA, AV FISTULA;  Surgeon: Lorraine Salvador MD;  Location: [facility] CATH/EP LAB;  Service: General;  Laterality: Left;    PHLEBOGRAPHY N/A 7/19/2024    Procedure: Venogram;  Surgeon: Khoobehi, Ali, MD;  Location: [facility] CATH/EP LAB;  Service: Vascular;  Laterality: N/A;    REMOVAL, TUNNELED CATH Right 7/19/2024    Procedure: REMOVAL, TUNNELED CATH;  Surgeon: Khoobehi, Ali, MD;  Location: [facility] CATH/EP LAB;  Service: Vascular;  Laterality: Right;    ROBOT-ASSISTED LAPAROSCOPIC RESECTION OF SIGMOID COLON USING DA DELANO XI N/A 9/17/2024    Procedure: XI ROBOTIC SIGMOID RESECTION, WITH ANASTAMOSIS;  Surgeon: Galileo Connors MD;  Location: [facility];  Service: General;  Laterality: N/A;  possible open have instruments available      Social History     Tobacco Use    Smoking status: Never     Passive exposure: Never    Smokeless tobacco: Never   Substance Use Topics    Alcohol use: Never     Family History   Problem Relation Name Age of Onset    Hypertension Mother         Pertinent medications noted:     Review of Systems  10 system review unremarkable.  As in HPI.    Outdoor activities:  Lives with his family  Travel:  No recent travel  Implants:  None  Antibiotic History:  As in HPI      Objective:      Blood pressure (!) 150/96, pulse 98, temperature 99.2 °F (37.3 °C), temperature source Temporal, height 6' 2" (1.88 m), weight 115.4 kg (254 lb 6.4 oz), SpO2 99%. Body mass index is 32.66 kg/m².  Physical Exam  "     General:  Middle-aged man who looks remarkably better than when he was in the hospital.  In no acute distress   CVS: S1 and 2 heard   Respiratory: Clear to auscultation   Abdomen:  3 x 1.4 x 2 cm clean wound in the midline lower infraumbilical region.  Minimal serous drainage on dressing.  Abdomen is otherwise full, soft, nontender.  No palpable organomegaly   Left groin: Healed  Skin:  No rash.  We are going to patches on bearded area of the face with no rash or flakiness at this time  Psychiatric: Normal mood, speech,  demeanor     Wound:  Lower abdominal wound    VAD:  None      General Labs reviewed:  Lab Results   Component Value Date    WBC 5.94 10/29/2024    RBC 2.92 (L) 10/29/2024    HGB 8.9 (L) 10/29/2024    HCT 29.1 (L) 10/29/2024     (H) 10/29/2024    MCH 30.5 10/29/2024    MCHC 30.6 (L) 10/29/2024    RDW 18.5 (H) 10/29/2024     10/29/2024    MPV 9.2 10/29/2024    GRAN 3.1 10/29/2024    GRAN 52.5 10/29/2024    LYMPH 1.6 10/29/2024    LYMPH 26.1 10/29/2024    MONO 1.0 10/29/2024    MONO 16.7 (H) 10/29/2024    EOS 0.2 10/29/2024    BASO 0.03 10/29/2024    EOSINOPHIL 4.0 10/29/2024    BASOPHIL 0.5 10/29/2024     CMP  Sodium   Date Value Ref Range Status   10/29/2024 138 136 - 145 mmol/L Final     Potassium   Date Value Ref Range Status   10/29/2024 4.7 3.5 - 5.1 mmol/L Final     Chloride   Date Value Ref Range Status   10/29/2024 101 95 - 110 mmol/L Final     CO2   Date Value Ref Range Status   10/29/2024 27 23 - 29 mmol/L Final     Glucose   Date Value Ref Range Status   10/29/2024 94 70 - 110 mg/dL Final     BUN   Date Value Ref Range Status   10/29/2024 26 (H) 6 - 20 mg/dL Final     Creatinine   Date Value Ref Range Status   10/29/2024 11.6 (H) 0.5 - 1.4 mg/dL Final     Calcium   Date Value Ref Range Status   10/29/2024 9.0 8.7 - 10.5 mg/dL Final     Total Protein   Date Value Ref Range Status   10/27/2024 7.2 6.0 - 8.4 g/dL Final     Albumin   Date Value Ref Range Status   10/29/2024  3.6 3.5 - 5.2 g/dL Final     Total Bilirubin   Date Value Ref Range Status   10/27/2024 0.4 0.1 - 1.0 mg/dL Final     Comment:     For infants and newborns, interpretation of results should be based  on gestational age, weight and in agreement with clinical  observations.    Premature Infant recommended reference ranges:  Up to 24 hours.............<8.0 mg/dL  Up to 48 hours............<12.0 mg/dL  3-5 days..................<15.0 mg/dL  6-29 days.................<15.0 mg/dL       Alkaline Phosphatase   Date Value Ref Range Status   10/27/2024 77 55 - 135 U/L Final     AST   Date Value Ref Range Status   10/27/2024 25 10 - 40 U/L Final     ALT   Date Value Ref Range Status   10/27/2024 36 10 - 44 U/L Final     Anion Gap   Date Value Ref Range Status   10/29/2024 10 8 - 16 mmol/L Final     eGFR   Date Value Ref Range Status   10/29/2024 5.1 (A) >60 mL/min/1.73 m^2 Final           Micro:  Microbiology Results (last 7 days)       ** No results found for the last 168 hours. **          Imaging Reviewed:          Assessment:     1. Abdominal surgical wound.  Slowly healing.  Previous infection has clinically resolved.  Continue wound care.      2. Left groin wound.  Has healed.      3. ESRD on hemodialysis    4. Seborrheic dermatitis.  Clinically resolved at this time.    He will be discharged from ID Clinic today.  I can see p.r.n. as clinically indicated.  Problem List Items Addressed This Visit          Renal/    ESRD on hemodialysis       ID    Post-operative wound abscess - Primary        Plan:     As above      Post-operative wound abscess    ESRD on hemodialysis        This note was created using Dragon voice recognition software that occasionally misinterpreted phrases or words.

## 2024-11-19 ENCOUNTER — PATIENT MESSAGE (OUTPATIENT)
Dept: FAMILY MEDICINE | Facility: CLINIC | Age: 44
End: 2024-11-19
Payer: COMMERCIAL

## 2024-11-19 ENCOUNTER — PATIENT MESSAGE (OUTPATIENT)
Dept: CARDIOLOGY | Facility: CLINIC | Age: 44
End: 2024-11-19
Payer: COMMERCIAL

## 2024-11-19 PROCEDURE — G0180 MD CERTIFICATION HHA PATIENT: HCPCS | Mod: NTX,,, | Performed by: FAMILY MEDICINE

## 2024-11-21 ENCOUNTER — OFFICE VISIT (OUTPATIENT)
Dept: WOUND CARE | Facility: HOSPITAL | Age: 44
End: 2024-11-21
Attending: FAMILY MEDICINE
Payer: COMMERCIAL

## 2024-11-21 ENCOUNTER — OFFICE VISIT (OUTPATIENT)
Dept: FAMILY MEDICINE | Facility: CLINIC | Age: 44
End: 2024-11-21
Payer: COMMERCIAL

## 2024-11-21 VITALS — RESPIRATION RATE: 18 BRPM | TEMPERATURE: 99 F

## 2024-11-21 DIAGNOSIS — T81.31XD SURGICAL WOUND DEHISCENCE, SUBSEQUENT ENCOUNTER: ICD-10-CM

## 2024-11-21 DIAGNOSIS — T81.49XA POST-OPERATIVE WOUND ABSCESS: Primary | ICD-10-CM

## 2024-11-21 DIAGNOSIS — N18.6 ESRD (END STAGE RENAL DISEASE): ICD-10-CM

## 2024-11-21 DIAGNOSIS — I13.0 HYPERTENSIVE HEART AND RENAL DISEASE WITH CONGESTIVE HEART FAILURE: ICD-10-CM

## 2024-11-21 DIAGNOSIS — I10 ESSENTIAL HYPERTENSION: ICD-10-CM

## 2024-11-21 DIAGNOSIS — S31.109D OPEN WOUND OF ABDOMEN, SUBSEQUENT ENCOUNTER: ICD-10-CM

## 2024-11-21 DIAGNOSIS — I27.20 PULMONARY HYPERTENSION: ICD-10-CM

## 2024-11-21 DIAGNOSIS — Z00.00 HEALTHCARE MAINTENANCE: Primary | ICD-10-CM

## 2024-11-21 DIAGNOSIS — T81.49XA POST-OPERATIVE WOUND ABSCESS: ICD-10-CM

## 2024-11-21 PROCEDURE — 97605 NEG PRS WND THER DME<=50SQCM: CPT | Mod: PN | Performed by: FAMILY MEDICINE

## 2024-11-21 NOTE — PROGRESS NOTES
Established Patient - Audio Only Telehealth Visit       274}  The patient location is: [place]   The chief complaint leading to consultation is:   Chief Complaint   Patient presents with    Hypertension     Visit type: Virtual visit with audio only (telephone)  Total time spent with patient: 24 min        The reason for the audio only service rather than synchronous audio and video virtual visit was related to technical difficulties or patient preference/necessity.     Each patient to whom I provide medical services by telemedicine is:  (1) informed of the relationship between the physician and patient and the respective role of any other health care provider with respect to management of the patient; and (2) notified that they may decline to receive medical services by telemedicine and may withdraw from such care at any time. Patient verbally consented to receive this service via voice-only telephone call.     274}     HPI:  Patient needed some pain work filled out for his disability.  Patient currently has a wound in the abdomen that he has seen wound care and has home health coming but there has been some difficulty with home health because he is getting dialysis send that is leave his house for this.  Patient does have a wound VAC patient reports no fevers.  Patient can walk about 400 feet but becomes short of breath and this is his limitation.       Lab Results   Component Value Date    WBC 5.94 10/29/2024    HGB 8.9 (L) 10/29/2024    HCT 29.1 (L) 10/29/2024     (H) 10/29/2024     10/29/2024     10/29/2024    K 4.7 10/29/2024     10/29/2024    CALCIUM 9.0 10/29/2024    PHOS 5.5 (H) 10/29/2024    CO2 27 10/29/2024    GLU 94 10/29/2024    BUN 26 (H) 10/29/2024    CREATININE 11.6 (H) 10/29/2024    EGFRNORACEVR 5.1 (A) 10/29/2024    ANIONGAP 10 10/29/2024    PROT 7.2 10/27/2024    ALBUMIN 3.6 10/29/2024    BILITOT 0.4 10/27/2024    ALKPHOS 77 10/27/2024    ALT 36 10/27/2024    AST 25  10/27/2024    INR 1.0 10/25/2024    CHOL 141 10/11/2022    TRIG 66 10/11/2022    HDL 39 (L) 10/11/2022    LDLCALC 88.8 10/11/2022    TSH 1.699 10/10/2022    PSA 0.48 06/25/2024    HGBA1C 6.1 07/16/2024          Assessment and plan:  Abdominal wound-continue wound VAC monitor is seen wound care     Hypertension-continue current medications monitor   ESRD-continue dialysis monitor blood work   Pulmonary hypertension patient has shortness a breath on exertion monitor no chest pain    Health maintenance-will allow paperwork patient is seeing cardiology along with other specialist nephrology and wound care.       João was seen today for hypertension.    Diagnoses and all orders for this visit:    Healthcare maintenance    Essential hypertension    ESRD (end stage renal disease)    Hypertensive heart and renal disease with congestive heart failure    Pulmonary hypertension         This service was not originating from a related E/M service provided within the previous 7 days nor will  to an E/M service or procedure within the next 24 hours or my soonest available appointment.  Prevailing standard of care was able to be met in this audio-only visit.

## 2024-11-25 ENCOUNTER — PATIENT OUTREACH (OUTPATIENT)
Dept: ADMINISTRATIVE | Facility: OTHER | Age: 44
End: 2024-11-25
Payer: COMMERCIAL

## 2024-11-26 ENCOUNTER — TELEPHONE (OUTPATIENT)
Dept: ADMINISTRATIVE | Facility: OTHER | Age: 44
End: 2024-11-26
Payer: COMMERCIAL

## 2024-11-26 ENCOUNTER — PATIENT MESSAGE (OUTPATIENT)
Dept: ADMINISTRATIVE | Facility: OTHER | Age: 44
End: 2024-11-26
Payer: COMMERCIAL

## 2024-11-26 ENCOUNTER — PATIENT OUTREACH (OUTPATIENT)
Dept: ADMINISTRATIVE | Facility: OTHER | Age: 44
End: 2024-11-26
Payer: COMMERCIAL

## 2024-11-26 NOTE — PROGRESS NOTES
LPN spoke to patient/caregiver as per OPCM referral for: Not specified.    Does the patient consent to completing the assessment/enrollment: Yes  Does the patient consent for LPN to speak to a caregiver? No    Health Insurance Coverage:     Does the patient have adequate health insurance coverage? Yes  Education provided: Yes    PCP Follow-Up Appointments:    Does the patient have a primary care provider? yes - Dawson Granado MD  Date of last PCP appointment? 11/21/24  Next PCP appointment:  not set up yet  Was patient provided with education surrounding PCP services/creating a medical home? yes -       Specialist Appointments:     Does the patient have a pending specialist referral? no  Does the patient have an upcoming specialist appointment? yes - wound, cardio, inf disease, surgery, pulm  Is the patient pregnant? N/A  Does the patient have a mental health provider? no       Home Medications:     Reviewed medication list with patient? No  Is the patient able to afford their medications? Yes        Recent lab results:  Blood Sugar:    Provided education: No  Blood Pressure:   Provided education: No        Social Determinants of Health (SDOH)    Patient's identified areas of need:      Education/Resources provided:          Home Health/DME:    Current Home Health: Yes  Patient has all healthcare equipment/supplies indicated: yes      Additional Documentation:   Spoke to patient based on OPCM referral. States has HH and going to dialysis. C/o stomach pains and abnormal Bms since surgery in Sept. Pt would like referral to GI, will msg pcp. His appetite is low. He could use some resources for food/utility and mortgage assistance. States he is getting Social security but his long term disability might end soon and his income will be basically cut in half. Will f/u with resources. Pt states wound is healing well, seeing wound care regularly. Hoping wound vac can be discontinued soon.       Follow up:   Patient agrees  to scheduled follow up call.

## 2024-11-26 NOTE — TELEPHONE ENCOUNTER
Spoke to pt who states he is having what feels like gas pains, loose bowels and putty like bowels as well. Pt is currently at dialysis. Discussed evisit with pt. Pt agreed and will complete

## 2024-11-26 NOTE — PROGRESS NOTES
CHW - Follow Up    This LPN completed a follow up visit with patient via telephone today.  Pt/Caregiver reported:   Community Health Worker provided: [facility] is accepting applications for rental/mortgage assistance. Pt will need to call for phone assessment. Informed pt, sending him info in portal about it as well as some food [place]quita in the area. Sent pcp msg about stomach issues.   Follow up required:   Follow-up Outreach - Due: 12/3/2024

## 2024-12-02 PROCEDURE — 99001 SPECIMEN HANDLING PT-LAB: CPT | Mod: TXP | Performed by: NURSE PRACTITIONER

## 2024-12-03 ENCOUNTER — PATIENT OUTREACH (OUTPATIENT)
Dept: ADMINISTRATIVE | Facility: OTHER | Age: 44
End: 2024-12-03
Payer: COMMERCIAL

## 2024-12-03 ENCOUNTER — TELEPHONE (OUTPATIENT)
Dept: FAMILY MEDICINE | Facility: CLINIC | Age: 44
End: 2024-12-03
Payer: COMMERCIAL

## 2024-12-03 DIAGNOSIS — I10 ESSENTIAL HYPERTENSION: Primary | ICD-10-CM

## 2024-12-03 NOTE — TELEPHONE ENCOUNTER
----- Message from Teri sent at 12/3/2024  2:35 PM CST -----  Regarding: advise  Contact: nurse  Type: Needs Medical Advice  Who Called:  nurse   Symptoms (please be specific):    How long has patient had these symptoms:    Pharmacy name and phone #:    Best Call Back Number: [phone]  Additional Information: finding whole pills in his bag after taking them which concerns them that his body is not breaking the medication down.

## 2024-12-03 NOTE — TELEPHONE ENCOUNTER
Spoke to pt nurse who states pt is reporting pills are completely intact when emptying ostomy bag. Pt is afraid body is not absorbing pills. Please advise

## 2024-12-03 NOTE — PROGRESS NOTES
CHW - Follow Up    This LPN completed a follow up visit with patient via telephone today.  Pt/Caregiver reported:   Community Health Worker provided: Spoke to patient, States didn't try the resources I sent him in portal yet. states bowel movements are less frequent but still with same issues. Spoke with pcp who was going to schedule an EVISIT but he has not heard back about it. Will check with office.   Follow up required:   Follow-up Outreach - Due: 12/10/2024

## 2024-12-04 ENCOUNTER — E-CONSULT (OUTPATIENT)
Dept: PHARMACY | Facility: CLINIC | Age: 44
End: 2024-12-04
Payer: COMMERCIAL

## 2024-12-04 ENCOUNTER — TELEPHONE (OUTPATIENT)
Dept: PHARMACY | Facility: CLINIC | Age: 44
End: 2024-12-04
Payer: COMMERCIAL

## 2024-12-04 DIAGNOSIS — Z79.899 ENCOUNTER FOR MEDICATION REVIEW: Primary | ICD-10-CM

## 2024-12-05 ENCOUNTER — PATIENT OUTREACH (OUTPATIENT)
Dept: ADMINISTRATIVE | Facility: OTHER | Age: 44
End: 2024-12-05
Payer: COMMERCIAL

## 2024-12-05 ENCOUNTER — OFFICE VISIT (OUTPATIENT)
Dept: WOUND CARE | Facility: HOSPITAL | Age: 44
End: 2024-12-05
Attending: FAMILY MEDICINE
Payer: COMMERCIAL

## 2024-12-05 VITALS
HEART RATE: 80 BPM | RESPIRATION RATE: 18 BRPM | SYSTOLIC BLOOD PRESSURE: 153 MMHG | DIASTOLIC BLOOD PRESSURE: 97 MMHG | TEMPERATURE: 98 F

## 2024-12-05 DIAGNOSIS — S31.109D OPEN WOUND OF ABDOMEN, SUBSEQUENT ENCOUNTER: ICD-10-CM

## 2024-12-05 DIAGNOSIS — T81.31XD SURGICAL WOUND DEHISCENCE, SUBSEQUENT ENCOUNTER: ICD-10-CM

## 2024-12-05 DIAGNOSIS — T81.49XA POST-OPERATIVE WOUND ABSCESS: Primary | ICD-10-CM

## 2024-12-05 PROCEDURE — 99214 OFFICE O/P EST MOD 30 MIN: CPT | Mod: PN | Performed by: FAMILY MEDICINE

## 2024-12-05 RX ORDER — HYOSCYAMINE SULFATE 0.12 MG/1
0.12 TABLET SUBLINGUAL EVERY 4 HOURS PRN
Qty: 20 TABLET | Refills: 0 | Status: SHIPPED | OUTPATIENT
Start: 2024-12-05

## 2024-12-05 RX ORDER — HYOSCYAMINE SULFATE 0.12 MG/1
TABLET SUBLINGUAL
Qty: 20 TABLET | Refills: 0 | OUTPATIENT
Start: 2024-12-05

## 2024-12-05 NOTE — PROGRESS NOTES
CHW - Case Closure    This LPN spoke to patient via telephone today.   Pt/Caregiver reported: Spoke to patient. He is at an appt. I did note that there is a portal msg from pcp office with a link to set up an evisit with provider. There is also a portal message from the pharmacist. Asked to call in future if needs.   Pt/Caregiver denied any additional needs at this time and agrees with episode closure at this time.  Provided patient with Community Health Worker's contact information and encouraged him/her to contact this Community Health Worker if additional needs arise.

## 2024-12-05 NOTE — PROGRESS NOTES
Wound Care Progress Note    Subjective:       Patient ID: João Ham is a 44 y.o. male.    Chief Complaint: No chief complaint on file.    HPI  Pt seen in clinic for a FU visit for an open surgical wound of the abdomen. Wound has improved, no longer needs the VAC, no other complaints today  Review of Systems   Skin:  Positive for wound.        Open wound abdomen   All other systems reviewed and are negative.      Objective:        Physical Exam  Vitals and nursing note reviewed.   Constitutional:       General: He is not in acute distress.     Appearance: Normal appearance.   Skin:     General: Skin is warm and dry.      Findings: Lesion present.      Comments: Open surgical wound of the abdomen, see wound care assessment documentation in chart review scanned under the media tab   Neurological:      General: No focal deficit present.      Mental Status: He is alert.   Psychiatric:         Mood and Affect: Mood normal.         Judgment: Judgment normal.       There were no vitals filed for this visit.    Assessment:           ICD-10-CM ICD-9-CM   1. Post-operative wound abscess  T81.49XA 998.59   2. Surgical wound dehiscence, subsequent encounter  T81.31XD V58.89     998.32   3. Open wound of abdomen, subsequent encounter  S31.109D V58.89     879.2                Plan:                  Diagnoses and all orders for this visit:    Post-operative wound abscess    Surgical wound dehiscence, subsequent encounter    Open wound of abdomen, subsequent encounter        Much of the documentation for this visit was completed in the Wound Docs system.  Please see the attached documentation for further details about the patient's care. Scanned under the Media tab.

## 2024-12-08 ENCOUNTER — LAB VISIT (OUTPATIENT)
Dept: LAB | Facility: HOSPITAL | Age: 44
End: 2024-12-08
Payer: COMMERCIAL

## 2024-12-08 DIAGNOSIS — Z76.82 ORGAN TRANSPLANT CANDIDATE: ICD-10-CM

## 2024-12-10 ENCOUNTER — TELEPHONE (OUTPATIENT)
Dept: FAMILY MEDICINE | Facility: CLINIC | Age: 44
End: 2024-12-10
Payer: COMMERCIAL

## 2024-12-10 VITALS — SYSTOLIC BLOOD PRESSURE: 156 MMHG | DIASTOLIC BLOOD PRESSURE: 90 MMHG

## 2024-12-10 NOTE — TELEPHONE ENCOUNTER
Spoke to  nurse who states pt BP was elevated so she instructed pt to take clonidine 0.3mg.    Spoke to pt who states he just took the clonidine 5min ago and has not rechecked his BP since. Pt denies and concerns or symptoms stating he feels fine. Pt has been instructed to recheck his BP while on the phone with nurse. Current BP is 156/90 while on phone with nurse. Pt has been instucted to continue to monitor his symptoms and BP. Pt will call back or send additional BP readings to Dr. Granado's office in an hour.

## 2024-12-10 NOTE — CONSULTS
[place] [facility]  Response for E-Consult     Patient Name: João Ham  MRN: [id number]  Primary Care Provider: Dawson Granado MD   Requesting Provider: Dawson Granado MD  E-Consult to Pharmacy  Consult performed by: Warren Hammond, PharmD  Consult ordered by: Dawson Granado MD  Reason for consult: Other  Assessment/Recommendations: Could not reach patient          Unfortunately, this eConsult has been declined due to: Other    Other:  This eConsult submission cannot be completed at this time due to could not reach patient after multiple attempts.      Thank you for this eConsult referral.     Warren Hammond, PharmD  [place] [facility]

## 2024-12-10 NOTE — TELEPHONE ENCOUNTER
----- Message from Syeda sent at 12/10/2024 11:21 AM CST -----  Contact: Home health  Type:  Needs Medical Advice    Who Called: Home health     Symptoms (please be specific): High /92      How long has patient had these symptoms:  Time 11am    Pharmacy name and phone #:    [facility] [place] - [address]  [address] [address]  Phone: [phone] Fax: [phone]      Would the patient rather a call back or a response via [facility]? Call    Best Call Back Number: Nurse [phone]    Additional Information: Nurse instructed patient to take meds. Please advise

## 2024-12-12 ENCOUNTER — OFFICE VISIT (OUTPATIENT)
Dept: WOUND CARE | Facility: HOSPITAL | Age: 44
End: 2024-12-12
Attending: FAMILY MEDICINE
Payer: COMMERCIAL

## 2024-12-12 VITALS
RESPIRATION RATE: 17 BRPM | TEMPERATURE: 98 F | SYSTOLIC BLOOD PRESSURE: 131 MMHG | DIASTOLIC BLOOD PRESSURE: 101 MMHG | HEART RATE: 103 BPM

## 2024-12-12 DIAGNOSIS — S31.109D OPEN WOUND OF ABDOMEN, SUBSEQUENT ENCOUNTER: ICD-10-CM

## 2024-12-12 DIAGNOSIS — T81.49XA POST-OPERATIVE WOUND ABSCESS: Primary | ICD-10-CM

## 2024-12-12 DIAGNOSIS — T81.31XD SURGICAL WOUND DEHISCENCE, SUBSEQUENT ENCOUNTER: ICD-10-CM

## 2024-12-12 PROCEDURE — 99213 OFFICE O/P EST LOW 20 MIN: CPT | Mod: PN | Performed by: FAMILY MEDICINE

## 2024-12-12 NOTE — PROGRESS NOTES
Wound Care Progress Note    Subjective:       Patient ID: João Ham is a 44 y.o. male.    Chief Complaint: No chief complaint on file.    HPI  Pt seen in clinic for a FU visit for an open surgical wound of the abdomen. Wound is healed. No new complaints today  Review of Systems   Skin:  Positive for wound.        Healed wound abdomen   All other systems reviewed and are negative.      Objective:        Physical Exam  Vitals and nursing note reviewed.   Constitutional:       General: He is not in acute distress.     Appearance: Normal appearance.   Skin:     General: Skin is warm and dry.      Findings: Lesion present.      Comments: Healed abdomen wound, see wound carer assessment documentation in chart review scanned under the media tab   Neurological:      General: No focal deficit present.      Mental Status: He is alert.   Psychiatric:         Judgment: Judgment normal.       There were no vitals filed for this visit.    Assessment:           ICD-10-CM ICD-9-CM   1. Post-operative wound abscess  T81.49XA 998.59   2. Surgical wound dehiscence, subsequent encounter  T81.31XD V58.89     998.32   3. Open wound of abdomen, subsequent encounter  S31.109D V58.89     879.2                Plan:                  Diagnoses and all orders for this visit:    Post-operative wound abscess    Surgical wound dehiscence, subsequent encounter    Open wound of abdomen, subsequent encounter      See wound care instructions provided separately

## 2024-12-17 ENCOUNTER — OFFICE VISIT (OUTPATIENT)
Dept: FAMILY MEDICINE | Facility: CLINIC | Age: 44
End: 2024-12-17
Payer: COMMERCIAL

## 2024-12-17 ENCOUNTER — E-CONSULT (OUTPATIENT)
Dept: PHARMACY | Facility: CLINIC | Age: 44
End: 2024-12-17
Payer: COMMERCIAL

## 2024-12-17 ENCOUNTER — CLINICAL SUPPORT (OUTPATIENT)
Dept: FAMILY MEDICINE | Facility: CLINIC | Age: 44
End: 2024-12-17
Payer: COMMERCIAL

## 2024-12-17 VITALS
HEART RATE: 94 BPM | DIASTOLIC BLOOD PRESSURE: 94 MMHG | OXYGEN SATURATION: 97 % | HEIGHT: 74 IN | WEIGHT: 250 LBS | DIASTOLIC BLOOD PRESSURE: 90 MMHG | HEART RATE: 98 BPM | RESPIRATION RATE: 16 BRPM | TEMPERATURE: 98 F | SYSTOLIC BLOOD PRESSURE: 136 MMHG | BODY MASS INDEX: 32.08 KG/M2 | SYSTOLIC BLOOD PRESSURE: 130 MMHG

## 2024-12-17 DIAGNOSIS — I10 HYPERTENSION, UNSPECIFIED TYPE: ICD-10-CM

## 2024-12-17 DIAGNOSIS — N18.6 ESRD (END STAGE RENAL DISEASE): ICD-10-CM

## 2024-12-17 DIAGNOSIS — I10 ESSENTIAL HYPERTENSION: Primary | ICD-10-CM

## 2024-12-17 DIAGNOSIS — I27.20 PULMONARY HYPERTENSION: ICD-10-CM

## 2024-12-17 DIAGNOSIS — Z71.89 OTHER SPECIFIED COUNSELING: Primary | ICD-10-CM

## 2024-12-17 DIAGNOSIS — I50.32 CHRONIC HEART FAILURE WITH PRESERVED EJECTION FRACTION: ICD-10-CM

## 2024-12-17 PROCEDURE — 3075F SYST BP GE 130 - 139MM HG: CPT | Mod: CPTII,S$GLB,, | Performed by: STUDENT IN AN ORGANIZED HEALTH CARE EDUCATION/TRAINING PROGRAM

## 2024-12-17 PROCEDURE — 99999 PR PBB SHADOW E&M-EST. PATIENT-LVL II: CPT | Mod: PBBFAC,,,

## 2024-12-17 PROCEDURE — 99999 PR PBB SHADOW E&M-EST. PATIENT-LVL IV: CPT | Mod: PBBFAC,,, | Performed by: STUDENT IN AN ORGANIZED HEALTH CARE EDUCATION/TRAINING PROGRAM

## 2024-12-17 PROCEDURE — 3066F NEPHROPATHY DOC TX: CPT | Mod: CPTII,S$GLB,, | Performed by: STUDENT IN AN ORGANIZED HEALTH CARE EDUCATION/TRAINING PROGRAM

## 2024-12-17 PROCEDURE — 4010F ACE/ARB THERAPY RXD/TAKEN: CPT | Mod: CPTII,S$GLB,, | Performed by: STUDENT IN AN ORGANIZED HEALTH CARE EDUCATION/TRAINING PROGRAM

## 2024-12-17 PROCEDURE — 3044F HG A1C LEVEL LT 7.0%: CPT | Mod: CPTII,S$GLB,, | Performed by: STUDENT IN AN ORGANIZED HEALTH CARE EDUCATION/TRAINING PROGRAM

## 2024-12-17 PROCEDURE — 3080F DIAST BP >= 90 MM HG: CPT | Mod: CPTII,S$GLB,, | Performed by: STUDENT IN AN ORGANIZED HEALTH CARE EDUCATION/TRAINING PROGRAM

## 2024-12-17 PROCEDURE — 99499 UNLISTED E&M SERVICE: CPT | Mod: S$GLB,,, | Performed by: STUDENT IN AN ORGANIZED HEALTH CARE EDUCATION/TRAINING PROGRAM

## 2024-12-17 PROCEDURE — 3008F BODY MASS INDEX DOCD: CPT | Mod: CPTII,S$GLB,, | Performed by: STUDENT IN AN ORGANIZED HEALTH CARE EDUCATION/TRAINING PROGRAM

## 2024-12-17 PROCEDURE — 99214 OFFICE O/P EST MOD 30 MIN: CPT | Mod: S$GLB,,, | Performed by: STUDENT IN AN ORGANIZED HEALTH CARE EDUCATION/TRAINING PROGRAM

## 2024-12-17 RX ORDER — HYDRALAZINE HYDROCHLORIDE 100 MG/1
100 TABLET, FILM COATED ORAL EVERY 8 HOURS
Qty: 270 TABLET | Refills: 0 | Status: SHIPPED | OUTPATIENT
Start: 2024-12-17 | End: 2025-03-17

## 2024-12-17 NOTE — PROGRESS NOTES
João Ham 44 y.o. male is here today for Blood Pressure check.   History of HTN yes.    Review of patient's allergies indicates:   Allergen Reactions    Mushroom Swelling     Tongue swells    Tongue swells       Tongue swells     Creatinine   Date Value Ref Range Status   10/29/2024 11.6 (H) 0.5 - 1.4 mg/dL Final     Sodium   Date Value Ref Range Status   10/29/2024 138 136 - 145 mmol/L Final     Potassium   Date Value Ref Range Status   10/29/2024 4.7 3.5 - 5.1 mmol/L Final   ]  Patient verifies taking blood pressure medications on a regular basis at the same time of the day.     Current Outpatient Medications:     ALPRAZolam (XANAX) 0.25 MG tablet, Take 1 tablet (0.25 mg total) by mouth nightly as needed for Insomnia. (Patient not taking: Reported on 10/25/2024), Disp: , Rfl:     apixaban (ELIQUIS) 2.5 mg Tab, Take 1 tablet (2.5 mg total) by mouth 2 (two) times daily., Disp: , Rfl:     calcitRIOL (ROCALTROL) 0.25 MCG Cap, Take 1 capsule (0.25 mcg total) by mouth once daily., Disp: , Rfl:     calcium carbonate (TUMS) 200 mg calcium (500 mg) chewable tablet, Take 2 tablets (1,000 mg total) by mouth 3 (three) times daily., Disp: 180 tablet, Rfl: 11    cholecalciferol, vitamin D3, 125 mcg (5,000 unit) Tab, Take 5,000 Units by mouth every morning., Disp: , Rfl:     cloNIDine (CATAPRES) 0.3 MG tablet, Take 1 tablet (0.3 mg total) by mouth 3 (three) times daily as needed (SBP > 150)., Disp: , Rfl:     dicyclomine (BENTYL) 10 MG capsule, Take 1 capsule (10 mg total) by mouth 3 (three) times daily as needed (crampy abdominal pain)., Disp: 90 capsule, Rfl: 0    epoetin mily-epbx (RETACRIT) 20,000 unit/mL injection, Inject 0.67 mLs (13,400 Units total) into the skin every Mon, Wed, Fri., Disp: , Rfl:     heparin sodium,porcine (HEPARIN, PORCINE,) 1,000 unit/mL injection, Inject 4 mLs (4,000 Units total) into the vein as needed (line care after dialysis)., Disp: , Rfl:     heparin sodium,porcine (HEPARIN, PORCINE,) 5,000  unit/mL injection, Inject 1 mL (5,000 Units total) into the skin every 8 (eight) hours., Disp: , Rfl:     hydrALAZINE (APRESOLINE) 100 MG tablet, Take 1 tablet (100 mg total) by mouth every 8 (eight) hours. Hold for SBP < 120 and before dialysis, Disp: , Rfl:     hyoscyamine (LEVSIN/SL) 0.125 mg Subl, Place 1 tablet (0.125 mg total) under the tongue every 4 (four) hours as needed (abdominal cramping)., Disp: 20 tablet, Rfl: 0    isosorbide mononitrate (IMDUR) 120 MG 24 hr tablet, Take 120 mg by mouth every morning., Disp: , Rfl:     ketoconazole (NIZORAL) 2 % shampoo, Apply topically every other day. (Patient not taking: Reported on 11/14/2024), Disp: , Rfl:     LIDOcaine-prilocaine (EMLA) cream, Apply topically as needed (pre dialysis)., Disp: 30 g, Rfl: 2    metoprolol succinate (TOPROL-XL) 50 MG 24 hr tablet, Take 150 mg by mouth once daily., Disp: , Rfl:     miconazole (MICOTIN) 2 % cream, Apply topically 2 (two) times daily., Disp: , Rfl:     olmesartan (BENICAR) 40 MG tablet, Take 1 tablet (40 mg total) by mouth once daily., Disp: 90 tablet, Rfl: 0    ondansetron (ZOFRAN-ODT) 4 MG TbDL, Take 1 tablet (4 mg total) by mouth every 6 (six) hours as needed (nausea)., Disp: , Rfl:     sevelamer carbonate (RENVELA) 800 mg Tab, Take 2 tablets (1,600 mg total) by mouth 3 (three) times daily with meals., Disp: 180 tablet, Rfl: 11  Does patient have record of home blood pressure readings yes. Readings have been averaging 130/90.   Last dose of blood pressure medication was taken at 12/17/24 at 11 AM.  Patient is asymptomatic.   Complains of none.    /90 , Pulse: 94 .    Blood pressure reading after 15 minutes was 130/94, Pulse 94.  Dr. Granado notified. Pt scheduled for Dr. Granado due to questions about medication.

## 2024-12-17 NOTE — PROGRESS NOTES
[facility]    Subjective:    The HPI and pertinent ROS is included in the Diagnostic Impression Remarks section at the end of the note. Please see below for further details. Chief complaint is at end of note.     João is a pleasant intelligent patient who is here for evaluation.     Modified Medications    Modified Medication Previous Medication    HYDRALAZINE (APRESOLINE) 100 MG TABLET hydrALAZINE (APRESOLINE) 100 MG tablet       Take 1 tablet (100 mg total) by mouth every 8 (eight) hours. Hold for SBP < 120 and before dialysis    Take 1 tablet (100 mg total) by mouth every 8 (eight) hours. Hold for SBP < 120 and before dialysis       Data reviewed 274}  Previous medical records reviewed and summarized in plan section at end of note.      If you are due for any health screening(s) below please notify me so we can arrange them to be ordered and scheduled. Most healthy patients at your age complete them, but you are free to accept or refuse. If you can't do it, I'll definitely understand. If you can, I'd certainly appreciate it!     All of your core healthy metrics are met.      The following portions of the patient's history were reviewed and updated as appropriate: allergies, current medications, past family history, past medical history, past social history, past surgical history and problem list.    He  has a past medical history of Allergy, Anemia, Anxiety, CHF (congestive heart failure), Depression, High blood pressure with chronic kidney disease, stage 5 chronic kidney disease or end stage renal disease, and Hypertension.  He  has a past surgical history that includes Hernia repair (Right); insertion, catheter, tunneled (N/A, 6/22/2023); Fistulogram (Bilateral, 4/30/2024); Percutaneous transluminal angioplasty of arteriovenous fistula (Left, 4/30/2024); Phlebography (N/A, 7/19/2024); removal, tunneled cath (Right, 7/19/2024); Robot-assisted laparoscopic resection of sigmoid colon using  da Zoey Xi (N/A, 9/17/2024); Abscess drainage (Left, 9/17/2024); laparotomy, exploratory (N/A, 9/25/2024); colon resection (9/25/2024); Mobilization of splenic flexure (9/25/2024); Fistulogram (Left, 9/24/2024); and Percutaneous transluminal angioplasty of arteriovenous fistula (Left, 9/24/2024).    He  reports that he has never smoked. He has never been exposed to tobacco smoke. He has never used smokeless tobacco. He reports that he does not drink alcohol and does not use drugs.  He family history includes Hypertension in his mother.    Review of patient's allergies indicates:   Allergen Reactions    Mushroom Swelling     Tongue swells    Tongue swells       Tongue swells       Tobacco Use: Low Risk  (12/12/2024)    Patient History     Smoking Tobacco Use: Never     Smokeless Tobacco Use: Never     Passive Exposure: Never   Recent Concern: Tobacco Use - Medium Risk (10/6/2024)    Received from [facility]    Patient History     Smoking Tobacco Use: Former     Smokeless Tobacco Use: Never     Passive Exposure: Not on file     Physical Examination  Physical Exam  Vital Signs  Blood pressure is 130/94.     General appearance: alert, cooperative, no distress  Neck: no thyromegaly, no neck stiffness  Lungs: clear to auscultation, no wheezes, rales or rhonchi, symmetric air entry  Heart: normal rate, regular rhythm, normal S1, S2, no murmurs, rubs, clicks or gallops  Abdomen: soft, nontender, nondistended, no rigidity, rebound, or guarding.   Back: no point tenderness over spine  Extremities: peripheral pulses normal, no unilateral leg swelling or calf tenderness   Neurological:alert, oriented, normal speech, no new focal findings or movement disorder noted from baseline      BP Readings from Last 3 Encounters:   12/17/24 (!) 130/94   12/17/24 (!) 136/90   12/12/24 (!) 131/101     Wt Readings from Last 3 Encounters:   12/17/24 113.4 kg (250 lb)   11/14/24 116.2 kg (256 lb 3.2 oz)   11/14/24 115.4 kg (254 lb 6.4 oz)  "    BP (!) 130/94 (BP Location: Left arm, Patient Position: Sitting)   Pulse 94   Temp 98.3 °F (36.8 °C) (Oral)   Resp 16   Ht 6' 2" (1.88 m)   Wt 113.4 kg (250 lb)   SpO2 97%   BMI 32.10 kg/m²    274}  Laboratory: I have reviewed old labs below:    274}    Lab Results   Component Value Date    WBC 5.94 10/29/2024    HGB 8.9 (L) 10/29/2024    HCT 29.1 (L) 10/29/2024     (H) 10/29/2024     10/29/2024     10/29/2024    K 4.7 10/29/2024     10/29/2024    CALCIUM 9.0 10/29/2024    PHOS 5.5 (H) 10/29/2024    CO2 27 10/29/2024    GLU 94 10/29/2024    BUN 26 (H) 10/29/2024    CREATININE 11.6 (H) 10/29/2024    EGFRNORACEVR 5.1 (A) 10/29/2024    ANIONGAP 10 10/29/2024    PROT 7.2 10/27/2024    ALBUMIN 3.6 10/29/2024    BILITOT 0.4 10/27/2024    ALKPHOS 77 10/27/2024    ALT 36 10/27/2024    AST 25 10/27/2024    INR 1.0 10/25/2024    CHOL 141 10/11/2022    TRIG 66 10/11/2022    HDL 39 (L) 10/11/2022    LDLCALC 88.8 10/11/2022    TSH 1.699 10/10/2022    PSA 0.48 06/25/2024    HGBA1C 6.1 07/16/2024      Lab reviewed by me: Particular labs of significance that I will monitor, workup, or treat to improve are mentioned below in diagnostic impression remarks.    Results         Imaging/EKG: I have reviewed the pertinent results and my findings are noted in remarks.  274}    CC:   Chief Complaint   Patient presents with    Medication Problem     Pt found one of his pills come out of his stoma; another medication feels like shards of glass once it breaks down inside of pt's digestive system        274}    History of Present Illness  The patient is a 44-year-old male who is here for follow-up.    He has observed undigested pills in his stool, initially attributing this to the size of his CellCept medication. However, after transitioning to a powdered form of CellCept, he continues to find an oval-shaped pill in his stool. He suspects that the undigested pill may be either olmesartan or imdur or " acidophilus, a probiotic. He also reports finding small fragments of pills in his stoma bag, which he carefully removes to prevent puncturing the bag. He has not discussed this issue with his cardiologist during their last visit. He experiences intermittent loose stools, with periods of solid stools followed by loose stools a few hours later.    Supplemental Information  He is scheduled for a colostomy reversal procedure on 01/13/2025.    MEDICATIONS  Current: Olmesartan, isosorbide, metoprolol, hydralazine, Eliquis, acidophilus (probiotic).       Assessment/Plan  João Ham is a 44 y.o. male who presents to clinic with:  1. Essential hypertension    2. Hypertension, unspecified type    3. ESRD (end stage renal disease)    4. Pulmonary hypertension    5. Chronic heart failure with preserved ejection fraction       274}    Assessment & Plan  1. Hypertension.  His blood pressure was recorded at 130/94. He is currently on hydralazine and has run out of it. A refill for hydralazine will be provided. He is advised to continue sending home blood pressure readings through [facility].    2. Undigested pills in stool.  He reports finding undigested pills in his stool, which he suspects might be either olmesartan or Imdur. To determine which pill is not being absorbed, he will cut the olmesartan in half and monitor if it still appears whole in his stool. He will also stop taking the probiotic ([place] Acidophilus) for a week to see if it is the cause. He is advised to message with updates on the observations. If the pill remains unchanged it would likely be imdur and could consider switching to short acting release will get pharmacy econult     3. Chronic kidney disease (CKD).  He will continue with the current medication regimen and monitoring.    4. End-stage renal disease (ESRD).  He will continue with dialysis and monitoring.     CHF-stable continue current meds euvolemic no dyspnea monitor      This note was  generated with the assistance of ambient listening technology. Verbal consent was obtained by the patient and accompanying visitor(s) for the recording of patient appointment to facilitate this note. I attest to having reviewed and edited the generated note for accuracy, though some syntax or spelling errors may persist. Please contact the author of this note for any clarification.      1. Hypertension, unspecified type  - hydrALAZINE (APRESOLINE) 100 MG tablet; Take 1 tablet (100 mg total) by mouth every 8 (eight) hours. Hold for SBP < 120 and before dialysis  Dispense: 270 tablet; Refill: 0    2. ESRD (end stage renal disease)  - hydrALAZINE (APRESOLINE) 100 MG tablet; Take 1 tablet (100 mg total) by mouth every 8 (eight) hours. Hold for SBP < 120 and before dialysis  Dispense: 270 tablet; Refill: 0    3. Essential hypertension  - E-Consult to Pharmacy  - [facility] E-VISIT    4. Pulmonary hypertension    5. Chronic heart failure with preserved ejection fraction      Dawson Granado MD   274}    If you are due for any health screening(s) below please notify me so we can arrange them to be ordered and scheduled. Most healthy patients at your age complete them, but you are free to accept or refuse.     If you can't do it, I'll definitely understand. If you can, I'd certainly appreciate it!   All of your core healthy metrics are met.

## 2024-12-18 ENCOUNTER — TELEPHONE (OUTPATIENT)
Dept: FAMILY MEDICINE | Facility: CLINIC | Age: 44
End: 2024-12-18
Payer: COMMERCIAL

## 2024-12-18 NOTE — CONSULTS
[place] - [facility]acy[facility]  Response for E-Consult     Patient Name: João Ham  MRN: [id number]  Primary Care Provider: Dawson Granado MD   Requesting Provider: Dawson Granado MD  E-Consult to Pharmacy  Consult performed by: Nirmala Mcgill PharmD  Consult ordered by: Dawson Granado MD  Reason for consult: Medication conversion recommendation  Assessment/Recommendations: The tablet found in patient's stool is most likely the ghost shell of Imdur. The medication was already absorbed by the body but the shell is made of a waxy substance that does not dissolve in the body.     If patient is feeling uneasy about seeing ghost shells, therapy can be switched to Isordil 5 to 20 mg 2-3 times daily then increased to 10 to 40 mg 2-3 times daily; allow for a 14-hour nitrate-free period after the evening dose and before the morning dose to minimize risk of tolerance.          Recommendation: No formulation change recommended. However, if it is desired initiate Isordil 5 to 20 mg 2-3 times daily then increased to 10 to 40 mg 2-3 times daily; allow for a 14-hour nitrate-free period after the evening dose and before the morning dose to minimize risk of tolerance    Contingency that warrants a repeat eConsult or referral: n/a    Total time of Consultation: 20 minute    I did not speak to the requesting provider verbally about this.     *This eConsult is based on the clinical data available to me and is furnished without benefit of a physical examination. The eConsult will need to be interpreted in light of any clinical issues or changes in patient status not available to me at the time of filing this eConsults. Significant changes in patient condition or level of acuity should result in immediate formal consultation and reevaluation. Please alert me if you have further questions.    Thank you for this eConsult referral.     Nirmala Mcgill PharmD  [facility]

## 2024-12-19 ENCOUNTER — TELEPHONE (OUTPATIENT)
Dept: FAMILY MEDICINE | Facility: CLINIC | Age: 44
End: 2024-12-19
Payer: COMMERCIAL

## 2024-12-19 NOTE — TELEPHONE ENCOUNTER
"----- Message from Dawson Granado MD sent at 12/18/2024  6:31 AM CST -----  Regarding: pharmacy recommendation  The pharmacy team at [facility] reviewed our visit and sent me this advice "Assessment/Recommendations: The tablet found in patient's stool is most likely the ghost shell of Imdur. The medication was already absorbed by the body but the shell is made of a waxy substance that does not dissolve in the body.      "  "

## 2024-12-19 NOTE — TELEPHONE ENCOUNTER
"----- Message from Dawson Granado MD sent at 12/18/2024  6:31 AM CST -----  Regarding: pharmacy recommendation  The pharmacy team at [facility] reviewed our visit and sent me this advice "Assessment/Recommendations: The tablet found in patient's stool is most likely the ghost shell of Imdur. The medication was already absorbed by the body but the shell is made of a waxy substance that does not dissolve in the body.      "  "
"Portal message sent     Per Dr. Granado :  "Rec he discuss with cardiology"     "
See portal message, please avise.   Thank You  Shilpi  
Statement Selected

## 2024-12-23 ENCOUNTER — EXTERNAL HOME HEALTH (OUTPATIENT)
Dept: HOME HEALTH SERVICES | Facility: HOSPITAL | Age: 44
End: 2024-12-23

## 2024-12-23 ENCOUNTER — DOCUMENT SCAN (OUTPATIENT)
Dept: HOME HEALTH SERVICES | Facility: HOSPITAL | Age: 44
End: 2024-12-23
Payer: COMMERCIAL

## 2025-01-06 PROCEDURE — 86832 HLA CLASS I HIGH DEFIN QUAL: CPT | Mod: TXP | Performed by: NURSE PRACTITIONER

## 2025-01-06 PROCEDURE — 86833 HLA CLASS II HIGH DEFIN QUAL: CPT | Mod: TXP | Performed by: NURSE PRACTITIONER

## 2025-01-13 ENCOUNTER — TELEPHONE (OUTPATIENT)
Dept: SURGERY | Facility: CLINIC | Age: 45
End: 2025-01-13
Payer: COMMERCIAL

## 2025-01-13 NOTE — TELEPHONE ENCOUNTER
I called patient to inform him that I need to change his appointment time  from 1pm with Dr. Connors tomorrow to 3:15pm in [place].  Juan

## 2025-01-14 ENCOUNTER — OFFICE VISIT (OUTPATIENT)
Dept: SURGERY | Facility: CLINIC | Age: 45
End: 2025-01-14
Payer: COMMERCIAL

## 2025-01-14 ENCOUNTER — DOCUMENT SCAN (OUTPATIENT)
Dept: HOME HEALTH SERVICES | Facility: HOSPITAL | Age: 45
End: 2025-01-14
Payer: COMMERCIAL

## 2025-01-14 VITALS
SYSTOLIC BLOOD PRESSURE: 96 MMHG | WEIGHT: 252.19 LBS | DIASTOLIC BLOOD PRESSURE: 57 MMHG | HEART RATE: 101 BPM | HEIGHT: 74 IN | TEMPERATURE: 100 F | BODY MASS INDEX: 32.36 KG/M2

## 2025-01-14 DIAGNOSIS — Z09 HOSPITAL DISCHARGE FOLLOW-UP: ICD-10-CM

## 2025-01-14 DIAGNOSIS — Z12.11 SCREEN FOR COLON CANCER: Primary | ICD-10-CM

## 2025-01-14 DIAGNOSIS — Z90.49 S/P LAPAROSCOPIC-ASSISTED SIGMOIDECTOMY: ICD-10-CM

## 2025-01-14 PROCEDURE — 99999 PR PBB SHADOW E&M-EST. PATIENT-LVL V: CPT | Mod: PBBFAC,TXP,, | Performed by: SURGERY

## 2025-01-14 PROCEDURE — 1159F MED LIST DOCD IN RCRD: CPT | Mod: CPTII,NTX,S$GLB, | Performed by: SURGERY

## 2025-01-14 PROCEDURE — 99213 OFFICE O/P EST LOW 20 MIN: CPT | Mod: NTX,S$GLB,, | Performed by: SURGERY

## 2025-01-14 PROCEDURE — 99499 UNLISTED E&M SERVICE: CPT | Mod: NTX,,, | Performed by: FAMILY MEDICINE

## 2025-01-14 PROCEDURE — 3078F DIAST BP <80 MM HG: CPT | Mod: CPTII,NTX,S$GLB, | Performed by: SURGERY

## 2025-01-14 PROCEDURE — 3074F SYST BP LT 130 MM HG: CPT | Mod: CPTII,NTX,S$GLB, | Performed by: SURGERY

## 2025-01-14 PROCEDURE — 3008F BODY MASS INDEX DOCD: CPT | Mod: CPTII,NTX,S$GLB, | Performed by: SURGERY

## 2025-01-14 RX ORDER — SEVELAMER CARBONATE FOR ORAL SUSPENSION 2400 MG/1
POWDER, FOR SUSPENSION ORAL
COMMUNITY
Start: 2024-11-15

## 2025-01-15 ENCOUNTER — LAB VISIT (OUTPATIENT)
Dept: LAB | Facility: HOSPITAL | Age: 45
End: 2025-01-15
Payer: COMMERCIAL

## 2025-01-15 DIAGNOSIS — Z76.82 ORGAN TRANSPLANT CANDIDATE: ICD-10-CM

## 2025-01-15 NOTE — PROGRESS NOTES
Pleasant 44-year-old gentleman whom I am familiar with.  Patient was seen by me in September required exploratory laparotomy with a colostomy.  This is secondary to a fistula treating loop of colon and previously placed mesh.  He has had a lengthy recovery but notes he is doing well now.  He would like to proceed with colostomy reversal.  His wounds have all healed.  He is currently awaiting kidney transplant secondary to end-stage renal disease.  He was told he needed to have this surgery performed prior to renal transplant.      AFVSS  AAOx3  Soft/nd/nt  Ostomy in place and functional    Assessment: Colostomy in place     Lengthy discussion with the patient.  He is at a time where we can likely begin to consider proceeding with colostomy reversal.  He will need CT scan of the abdomen pelvis prior to proceeding with colostomy reversal.  Would also recommend colonoscopy through rectal stump and through the stoma to evaluate.  Patient expresses understanding we will arrange for these 2 studies.  He will follow up me once completed.

## 2025-01-16 ENCOUNTER — TELEPHONE (OUTPATIENT)
Dept: SURGERY | Facility: CLINIC | Age: 45
End: 2025-01-16
Payer: COMMERCIAL

## 2025-01-16 NOTE — TELEPHONE ENCOUNTER
I called patient to inform him that I scheduled him on Tuesday, 1/21/25 @ 2pm for a CT Abd/Pelvis without contrast @ [facility].  He needs to fast 4hrs prior and arrive @ 1:45pm @ the patient registration desk.  Zane will put in the referral for Gastro in [place] to call him to schedule the colonoscopy.  Patient clairetood.  Juan

## 2025-01-27 ENCOUNTER — TELEPHONE (OUTPATIENT)
Dept: RADIOLOGY | Facility: HOSPITAL | Age: 45
End: 2025-01-27

## 2025-01-27 LAB — HPRA INTERPRETATION: NORMAL

## 2025-01-28 ENCOUNTER — HOSPITAL ENCOUNTER (OUTPATIENT)
Dept: RADIOLOGY | Facility: HOSPITAL | Age: 45
Discharge: HOME | End: 2025-01-28
Attending: SURGERY
Payer: COMMERCIAL

## 2025-01-28 DIAGNOSIS — Z90.49 S/P LAPAROSCOPIC-ASSISTED SIGMOIDECTOMY: ICD-10-CM

## 2025-01-28 DIAGNOSIS — Z09 HOSPITAL DISCHARGE FOLLOW-UP: ICD-10-CM

## 2025-01-28 PROCEDURE — 74176 CT ABD & PELVIS W/O CONTRAST: CPT | Mod: 26,,, | Performed by: RADIOLOGY

## 2025-01-28 PROCEDURE — 74176 CT ABD & PELVIS W/O CONTRAST: CPT | Mod: TC

## 2025-02-03 PROCEDURE — 99001 SPECIMEN HANDLING PT-LAB: CPT | Mod: TXP | Performed by: NURSE PRACTITIONER

## 2025-02-06 ENCOUNTER — TELEPHONE (OUTPATIENT)
Dept: SURGERY | Facility: HOSPITAL | Age: 45
End: 2025-02-06

## 2025-02-06 ENCOUNTER — PATIENT MESSAGE (OUTPATIENT)
Dept: SURGERY | Facility: CLINIC | Age: 45
End: 2025-02-06
Payer: COMMERCIAL

## 2025-02-06 NOTE — TELEPHONE ENCOUNTER
Called pt and reviewed ct findings    Will arrange for cscope.  Hopefully on Tuesday 2/11.    He will f/u with me in office.

## 2025-02-07 ENCOUNTER — LAB VISIT (OUTPATIENT)
Dept: LAB | Facility: HOSPITAL | Age: 45
End: 2025-02-07
Payer: COMMERCIAL

## 2025-02-07 DIAGNOSIS — Z76.82 ORGAN TRANSPLANT CANDIDATE: ICD-10-CM

## 2025-02-10 ENCOUNTER — PATIENT MESSAGE (OUTPATIENT)
Dept: SURGERY | Facility: CLINIC | Age: 45
End: 2025-02-10
Payer: COMMERCIAL

## 2025-02-10 DIAGNOSIS — Z12.11 SCREEN FOR COLON CANCER: Primary | ICD-10-CM

## 2025-02-10 DIAGNOSIS — Z93.3 COLOSTOMY STATUS: Primary | ICD-10-CM

## 2025-02-10 RX ORDER — SODIUM CHLORIDE 9 MG/ML
INJECTION, SOLUTION INTRAVENOUS CONTINUOUS
OUTPATIENT
Start: 2025-02-10

## 2025-02-10 RX ORDER — SODIUM CHLORIDE, SODIUM LACTATE, POTASSIUM CHLORIDE, CALCIUM CHLORIDE 600; 310; 30; 20 MG/100ML; MG/100ML; MG/100ML; MG/100ML
INJECTION, SOLUTION INTRAVENOUS CONTINUOUS
OUTPATIENT
Start: 2025-02-10

## 2025-02-10 RX ORDER — METRONIDAZOLE 500 MG/100ML
500 INJECTION, SOLUTION INTRAVENOUS
OUTPATIENT
Start: 2025-02-10

## 2025-02-10 RX ORDER — SODIUM CHLORIDE 0.9 % (FLUSH) 0.9 %
10 SYRINGE (ML) INJECTION
OUTPATIENT
Start: 2025-02-10

## 2025-02-11 RX ORDER — NEOMYCIN SULFATE 500 MG/1
TABLET ORAL
Qty: 6 TABLET | Refills: 0 | Status: SHIPPED | OUTPATIENT
Start: 2025-02-11

## 2025-02-11 RX ORDER — POLYETHYLENE GLYCOL 3350, SODIUM SULFATE ANHYDROUS, SODIUM BICARBONATE, SODIUM CHLORIDE, POTASSIUM CHLORIDE 236; 22.74; 6.74; 5.86; 2.97 G/4L; G/4L; G/4L; G/4L; G/4L
4 POWDER, FOR SOLUTION ORAL ONCE
Qty: 4000 ML | Refills: 0 | Status: SHIPPED | OUTPATIENT
Start: 2025-02-11 | End: 2025-02-11

## 2025-02-12 LAB — HPRA INTERPRETATION: NORMAL

## 2025-02-13 ENCOUNTER — HOSPITAL ENCOUNTER (OUTPATIENT)
Dept: PREADMISSION TESTING | Facility: HOSPITAL | Age: 45
Discharge: HOME | End: 2025-02-13
Attending: SURGERY
Payer: COMMERCIAL

## 2025-02-13 ENCOUNTER — ANESTHESIA EVENT (OUTPATIENT)
Dept: SURGERY | Facility: HOSPITAL | Age: 45
End: 2025-02-13
Payer: COMMERCIAL

## 2025-02-13 ENCOUNTER — PATIENT MESSAGE (OUTPATIENT)
Dept: TRANSPLANT | Facility: CLINIC | Age: 45
End: 2025-02-13
Payer: COMMERCIAL

## 2025-02-13 VITALS — WEIGHT: 252 LBS | BODY MASS INDEX: 32.34 KG/M2 | HEIGHT: 74 IN

## 2025-02-13 DIAGNOSIS — Z01.818 PREOP TESTING: Primary | ICD-10-CM

## 2025-02-13 DIAGNOSIS — Z93.3 COLOSTOMY STATUS: ICD-10-CM

## 2025-02-13 LAB
ABO + RH BLD: NORMAL
ALBUMIN SERPL BCP-MCNC: 3.8 G/DL (ref 3.5–5.2)
ALP SERPL-CCNC: 64 U/L (ref 40–150)
ALT SERPL W/O P-5'-P-CCNC: 21 U/L (ref 10–44)
ANION GAP SERPL CALC-SCNC: 20 MMOL/L (ref 8–16)
APTT PPP: 26.1 SEC (ref 21–32)
AST SERPL-CCNC: 15 U/L (ref 10–40)
BASOPHILS # BLD AUTO: 0.02 K/UL (ref 0–0.2)
BASOPHILS NFR BLD: 0.4 % (ref 0–1.9)
BILIRUB SERPL-MCNC: 0.3 MG/DL (ref 0.1–1)
BLD GP AB SCN CELLS X3 SERPL QL: NORMAL
BUN SERPL-MCNC: 49 MG/DL (ref 6–20)
CALCIUM SERPL-MCNC: 8.2 MG/DL (ref 8.7–10.5)
CHLORIDE SERPL-SCNC: 96 MMOL/L (ref 95–110)
CO2 SERPL-SCNC: 24 MMOL/L (ref 23–29)
CREAT SERPL-MCNC: 10.7 MG/DL (ref 0.5–1.4)
DIFFERENTIAL METHOD BLD: ABNORMAL
EOSINOPHIL # BLD AUTO: 0.1 K/UL (ref 0–0.5)
EOSINOPHIL NFR BLD: 2.3 % (ref 0–8)
ERYTHROCYTE [DISTWIDTH] IN BLOOD BY AUTOMATED COUNT: 14.7 % (ref 11.5–14.5)
EST. GFR  (NO RACE VARIABLE): 6 ML/MIN/1.73 M^2
GLUCOSE SERPL-MCNC: 87 MG/DL (ref 70–110)
HCT VFR BLD AUTO: 37.5 % (ref 40–54)
HGB BLD-MCNC: 12.1 G/DL (ref 14–18)
IMM GRANULOCYTES # BLD AUTO: 0.01 K/UL (ref 0–0.04)
IMM GRANULOCYTES NFR BLD AUTO: 0.2 % (ref 0–0.5)
INR PPP: 1 (ref 0.8–1.2)
LYMPHOCYTES # BLD AUTO: 1.8 K/UL (ref 1–4.8)
LYMPHOCYTES NFR BLD: 34.4 % (ref 18–48)
MCH RBC QN AUTO: 30.1 PG (ref 27–31)
MCHC RBC AUTO-ENTMCNC: 32.3 G/DL (ref 32–36)
MCV RBC AUTO: 93 FL (ref 82–98)
MONOCYTES # BLD AUTO: 0.6 K/UL (ref 0.3–1)
MONOCYTES NFR BLD: 11.5 % (ref 4–15)
NEUTROPHILS # BLD AUTO: 2.7 K/UL (ref 1.8–7.7)
NEUTROPHILS NFR BLD: 51.2 % (ref 38–73)
NRBC BLD-RTO: 0 /100 WBC
PLATELET # BLD AUTO: 270 K/UL (ref 150–450)
PMV BLD AUTO: 9.8 FL (ref 9.2–12.9)
POTASSIUM SERPL-SCNC: 3.9 MMOL/L (ref 3.5–5.1)
PROT SERPL-MCNC: 8.8 G/DL (ref 6–8.4)
PROTHROMBIN TIME: 10.9 SEC (ref 9–12.5)
RBC # BLD AUTO: 4.02 M/UL (ref 4.6–6.2)
SODIUM SERPL-SCNC: 140 MMOL/L (ref 136–145)
SPECIMEN OUTDATE: NORMAL
WBC # BLD AUTO: 5.23 K/UL (ref 3.9–12.7)

## 2025-02-13 PROCEDURE — 85610 PROTHROMBIN TIME: CPT | Performed by: ANESTHESIOLOGY

## 2025-02-13 PROCEDURE — 85730 THROMBOPLASTIN TIME PARTIAL: CPT | Performed by: ANESTHESIOLOGY

## 2025-02-13 PROCEDURE — 80053 COMPREHEN METABOLIC PANEL: CPT | Performed by: SURGERY

## 2025-02-13 PROCEDURE — 36415 COLL VENOUS BLD VENIPUNCTURE: CPT | Performed by: SURGERY

## 2025-02-13 PROCEDURE — 86900 BLOOD TYPING SEROLOGIC ABO: CPT | Performed by: SURGERY

## 2025-02-13 PROCEDURE — 85025 COMPLETE CBC W/AUTO DIFF WBC: CPT | Performed by: SURGERY

## 2025-02-13 NOTE — DISCHARGE INSTRUCTIONS
To confirm, Your doctor has instructed you that surgery is scheduled for: 2/24/25 with DR. MEJÍA    Please report to [facility], Registration the morning of surgery. You must check-in and receive a wristband before going to your procedure.  [address] [address] [place] [address]    Pre-Op will call the FRIDAY prior to surgery between 1:00 and 6:00 PM with the final arrival time.  Phone number: [phone]    PLEASE NOTE:  The surgery schedule has many variables which may affect the time of your surgery case.  Family members should be available if your surgery time changes.  Plan to be here the day of your procedure between 4-6 hours.    MEDICATIONS:  TAKE ONLY THESE MEDICATIONS WITH A SMALL SIP OF WATER THE MORNING OF YOUR PROCEDURE:  SEE MED LIST      DO NOT TAKE THESE MEDICATIONS 5-7 DAYS PRIOR to your procedure or per your surgeon's request:   ASPIRIN, ALEVE, ADVIL, IBUPROFEN, FISH OIL VITAMIN E, HERBALS  (May take Tylenol)    ONLY if you are prescribed any types of blood thinners such as:  Aspirin, Coumadin, Plavix, Pradaxa, Xarelto, Aggrenox, Effient, Eliquis, Savasya, Brilinta, or any other, ask your surgeon whether you should stop taking them and how long before surgery you should stop.  You may also need to verify with the prescribing physician if it is ok to stop your medication.      INSTRUCTIONS IMPORTANT!!  Do not eat or drink anything between midnight and the time of your procedure- this includes gum, mints, and candy.  EXCEPT: you may have clear liquids such as:  WATER, BLACK COFFEE, UNSWEET TEA, OR GATORADE (NO RED OR PURPLE) UP TO 2 HOURS PRIOR TO YOUR ARRIVAL TIME.  Do not smoke or drink alcoholic beverages 24 hours prior to your procedure.  Shower the night before AND the morning of your procedure with a Chlorhexidine wash such as Hibiclens or Dial antibacterial soap from the neck down.  Do not get it on your face or in your eyes.  You may use your own shampoo and face wash.  This helps your skin to be as bacteria free as possible.    If you wear contact lenses, dentures, hearing aids or glasses, bring a container to put them in during surgery and give to a family member for safe keeping.  Please leave all jewelry, piercing's and valuables at home. You must remove your false eyelashes prior to surgery.    DO NOT remove hair from the surgery site.  Do not shave the incision site unless you are given specific instructions to do so.    ONLY if you have been diagnosed with sleep apnea please bring your C-PAP machine.  ONLY if you wear home oxygen please bring your portable oxygen tank the day of your procedure.  ONLY if you have a history of OPEN HEART SURGERY you will need a clearance from your Cardiologist per Anesthesia.      ONLY for patients requiring bowel prep, written instructions will be given by your doctor's office.  ONLY if you have a neuro stimulator, please bring the controller with you the morning of surgery  ONLY if a type and screen test is needed before surgery, please return:  If your doctor has scheduled you for an overnight stay, bring a small overnight bag with any personal items you need.  Make arrangements in advance for transportation home by a responsible adult. You can not go home in an uber or a cab per hospital policy.  It is not safe to drive a vehicle during the 24 hours after anesthesia.          All  facilities and properties are tobacco free.  Smoking is NOT allowed.   If you have any questions about these instructions, call Pre-Op Admit  Nursing at [phone] or the Pre-Op Day Surgery Unit at [phone].

## 2025-02-13 NOTE — OR NURSING
Dr. Mercer with anesthesia reviewed patients chart and stated ok to proceed with surgery on 2/24/25 with Dr. Connors.

## 2025-02-16 ENCOUNTER — NURSE TRIAGE (OUTPATIENT)
Dept: ADMINISTRATIVE | Facility: CLINIC | Age: 45
End: 2025-02-16
Payer: COMMERCIAL

## 2025-02-16 NOTE — TELEPHONE ENCOUNTER
Pt called and stated he has a colonoscopy tomorrow but was not informed on what time. Pt Informed his procedure is scheduled for 0715 and advised pt to arrive an hour early. Pt verbalized understanding.  Reason for Disposition   Question about upcoming scheduled surgery, procedure or test, no triage required, and triager able to answer question    Protocols used: Information Only Call - No Triage-A-

## 2025-02-17 ENCOUNTER — HOSPITAL ENCOUNTER (OUTPATIENT)
Facility: HOSPITAL | Age: 45
Discharge: HOME | End: 2025-02-17
Attending: SURGERY | Admitting: STUDENT IN AN ORGANIZED HEALTH CARE EDUCATION/TRAINING PROGRAM
Payer: COMMERCIAL

## 2025-02-17 ENCOUNTER — ANESTHESIA (OUTPATIENT)
Dept: ENDOSCOPY | Facility: HOSPITAL | Age: 45
End: 2025-02-17
Payer: COMMERCIAL

## 2025-02-17 ENCOUNTER — ANESTHESIA EVENT (OUTPATIENT)
Dept: ENDOSCOPY | Facility: HOSPITAL | Age: 45
End: 2025-02-17
Payer: COMMERCIAL

## 2025-02-17 DIAGNOSIS — Z12.11 SCREEN FOR COLON CANCER: ICD-10-CM

## 2025-02-17 PROCEDURE — 37000008 HC ANESTHESIA 1ST 15 MINUTES: Performed by: SURGERY

## 2025-02-17 PROCEDURE — 37000009 HC ANESTHESIA EA ADD 15 MINS: Performed by: SURGERY

## 2025-02-17 PROCEDURE — 44388 COLONOSCOPY THRU STOMA SPX: CPT | Performed by: SURGERY

## 2025-02-17 PROCEDURE — 63600175 PHARM REV CODE 636 W HCPCS: Performed by: STUDENT IN AN ORGANIZED HEALTH CARE EDUCATION/TRAINING PROGRAM

## 2025-02-17 PROCEDURE — 25000003 PHARM REV CODE 250: Performed by: SURGERY

## 2025-02-17 RX ORDER — PROPOFOL 10 MG/ML
VIAL (ML) INTRAVENOUS
Status: DISCONTINUED | OUTPATIENT
Start: 2025-02-17 | End: 2025-02-17

## 2025-02-17 RX ORDER — SODIUM CHLORIDE 9 MG/ML
INJECTION, SOLUTION INTRAVENOUS CONTINUOUS
Status: DISCONTINUED | OUTPATIENT
Start: 2025-02-17 | End: 2025-02-17 | Stop reason: HOSPADM

## 2025-02-17 RX ORDER — LIDOCAINE HYDROCHLORIDE 20 MG/ML
INJECTION INTRAVENOUS
Status: DISCONTINUED | OUTPATIENT
Start: 2025-02-17 | End: 2025-02-17

## 2025-02-17 RX ADMIN — PROPOFOL 40 MG: 10 INJECTION, EMULSION INTRAVENOUS at 07:02

## 2025-02-17 RX ADMIN — PROPOFOL 80 MG: 10 INJECTION, EMULSION INTRAVENOUS at 07:02

## 2025-02-17 RX ADMIN — PROPOFOL 50 MG: 10 INJECTION, EMULSION INTRAVENOUS at 07:02

## 2025-02-17 RX ADMIN — PROPOFOL 20 MG: 10 INJECTION, EMULSION INTRAVENOUS at 07:02

## 2025-02-17 RX ADMIN — LIDOCAINE HYDROCHLORIDE 60 MG: 20 INJECTION, SOLUTION INTRAVENOUS at 07:02

## 2025-02-17 RX ADMIN — SODIUM CHLORIDE: 9 INJECTION, SOLUTION INTRAVENOUS at 07:02

## 2025-02-17 NOTE — H&P (VIEW-ONLY)
[facility] - Endoscopy  History & Physical     Subjective:     Chief Complaint/Reason for Admission: history of colostomy    History of Present Illness:   Patient 44 y.o. male presents for colonoscopy.  Pt has colostomy in placed and is in need of colonoscopy prior to reversal.  Pt with PMHx significant for ESRD.      Patient Active Problem List    Diagnosis Date Noted    Open wound of abdomen 11/14/2024    Surgical wound dehiscence, subsequent encounter 10/25/2024    Anticoagulated 10/25/2024    History of ESBL E. coli infection 10/25/2024    Severe malnutrition 10/19/2024    Post-operative wound abscess 10/18/2024    ESRD on hemodialysis 10/18/2024    Post-op pain 10/18/2024    Bleeding from the nose 10/02/2024    Insomnia 10/01/2024    Debility 09/30/2024    Acute hypoxic respiratory failure 09/25/2024    Sepsis 09/25/2024    Patient on waiting list for kidney transplant 08/22/2024    Essential hypertension 07/17/2024    Bacteremia 07/15/2024    Sleep apnea 03/31/2023    Hyperparathyroidism 01/10/2023    Erectile dysfunction 01/10/2023    Pulmonary hypertension 11/29/2022    Hypertensive heart and renal disease with congestive heart failure 11/29/2022    Severe obesity with body mass index (BMI) of 35.0 to 39.9 with comorbidity 10/17/2022    ESRD (end stage renal disease) 10/10/2022    Anemia due to chronic kidney disease, on chronic dialysis 10/10/2022    Persistent proteinuria 10/10/2022        Prescriptions Prior to Admission[1]  Review of patient's allergies indicates:   Allergen Reactions    Mushroom Swelling     Tongue swells    Tongue swells       Tongue swells        Past Medical History:   Diagnosis Date    Allergy     Anemia     Anxiety     CHF (congestive heart failure)     Depression     Dialysis patient     High blood pressure with chronic kidney disease, stage 5 chronic kidney disease or end stage renal disease     Hypertension       Past Surgical History:   Procedure Laterality Date     ABSCESS DRAINAGE Left 9/17/2024    Procedure: DRAINAGE, ABSCESS, GROIN;  Surgeon: Galileo Connors MD;  Location: [facility] OR;  Service: General;  Laterality: Left;    COLON RESECTION  9/25/2024    Procedure: COLON RESECTION;  Surgeon: Galileo Connors MD;  Location: [facility] OR;  Service: General;;    FISTULOGRAM Bilateral 4/30/2024    Procedure: Fistulogram;  Surgeon: Khoobehi, Ali, MD;  Location: [facility] CATH/EP LAB;  Service: Vascular;  Laterality: Bilateral;    FISTULOGRAM Left 9/24/2024    Procedure: Fistulogram;  Surgeon: Lorraine Salvador MD;  Location: [facility] CATH/EP LAB;  Service: General;  Laterality: Left;    HERNIA REPAIR Right     With mesh    INSERTION, CATHETER, TUNNELED N/A 6/22/2023    Procedure: Insertion,catheter,tunneled;  Surgeon: Everett Caicedo MD;  Location: [facility] OR;  Service: General;  Laterality: N/A;    LAPAROTOMY, EXPLORATORY N/A 9/25/2024    Procedure: LAPAROTOMY, EXPLORATORY;  Surgeon: Galileo Connors MD;  Location: [facility] OR;  Service: General;  Laterality: N/A;    MOBILIZATION OF SPLENIC FLEXURE  9/25/2024    Procedure: MOBILIZATION, SPLENIC FLEXURE;  Surgeon: Galileo Connors MD;  Location: [facility] OR;  Service: General;;    PERCUTANEOUS TRANSLUMINAL ANGIOPLASTY OF ARTERIOVENOUS FISTULA Left 4/30/2024    Procedure: PTA, AV FISTULA;  Surgeon: Khoobehi, Ali, MD;  Location: [facility] CATH/EP LAB;  Service: Vascular;  Laterality: Left;    PERCUTANEOUS TRANSLUMINAL ANGIOPLASTY OF ARTERIOVENOUS FISTULA Left 9/24/2024    Procedure: PTA, AV FISTULA;  Surgeon: Lorraine Salvador MD;  Location: [facility] CATH/EP LAB;  Service: General;  Laterality: Left;    PHLEBOGRAPHY N/A 7/19/2024    Procedure: Venogram;  Surgeon: Khoobehi, Ali, MD;  Location: [facility] CATH/EP LAB;  Service: Vascular;  Laterality: N/A;    REMOVAL, TUNNELED CATH Right 7/19/2024    Procedure: REMOVAL, TUNNELED CATH;  Surgeon: Khoobehi, Ali, MD;  Location: [facility] CATH/EP LAB;  Service: Vascular;  Laterality: Right;    ROBOT-ASSISTED LAPAROSCOPIC RESECTION OF  "SIGMOID COLON USING DA DELANO XI N/A 9/17/2024    Procedure: XI ROBOTIC SIGMOID RESECTION, WITH ANASTAMOSIS;  Surgeon: Galileo Connors MD;  Location: [facility] OR;  Service: General;  Laterality: N/A;  possible open have instruments available        Social History     Tobacco Use    Smoking status: Never     Passive exposure: Never    Smokeless tobacco: Never   Substance Use Topics    Alcohol use: Never        Review of Systems:  A comprehensive review of systems was negative.    OBJECTIVE:     Patient Vitals for the past 8 hrs:   BP Temp Temp src Pulse Resp SpO2 Height Weight   02/17/25 0655 (!) 188/106 98.2 °F (36.8 °C) Skin 84 16 96 % 6' 2" (1.88 m) 114.3 kg (252 lb)     AAOx3  Soft/nd/nt  No resp distress      Data Review:      ASSESSMENT/PLAN:     There are no hospital problems to display for this patient.  Screening cscope    Plan:  TO have cscope         [1]   Medications Prior to Admission   Medication Sig Dispense Refill Last Dose/Taking    olmesartan (BENICAR) 40 MG tablet Take 1 tablet (40 mg total) by mouth once daily. 90 tablet 0 2/13/2025    ALPRAZolam (XANAX) 0.25 MG tablet Take 1 tablet (0.25 mg total) by mouth nightly as needed for Insomnia. (Patient not taking: Reported on 10/22/2024)       apixaban (ELIQUIS) 2.5 mg Tab Take 1 tablet (2.5 mg total) by mouth 2 (two) times daily.   2/13/2025    calcitRIOL (ROCALTROL) 0.25 MCG Cap Take 1 capsule (0.25 mcg total) by mouth once daily. (Patient not taking: Reported on 1/14/2025)       calcium carbonate (TUMS) 200 mg calcium (500 mg) chewable tablet Take 2 tablets (1,000 mg total) by mouth 3 (three) times daily. (Patient not taking: Reported on 1/14/2025) 180 tablet 11     cholecalciferol, vitamin D3, 125 mcg (5,000 unit) Tab Take 5,000 Units by mouth every morning. (Patient not taking: Reported on 1/14/2025)       cloNIDine (CATAPRES) 0.3 MG tablet Take 1 tablet (0.3 mg total) by mouth 3 (three) times daily as needed (SBP > 150).       dicyclomine (BENTYL) " 10 MG capsule Take 1 capsule (10 mg total) by mouth 3 (three) times daily as needed (crampy abdominal pain). (Patient not taking: Reported on 1/14/2025) 90 capsule 0     epoetin mily-epbx (RETACRIT) 20,000 unit/mL injection Inject 0.67 mLs (13,400 Units total) into the skin every Mon, Wed, Fri.       heparin sodium,porcine (HEPARIN, PORCINE,) 1,000 unit/mL injection Inject 4 mLs (4,000 Units total) into the vein as needed (line care after dialysis).       heparin sodium,porcine (HEPARIN, PORCINE,) 5,000 unit/mL injection Inject 1 mL (5,000 Units total) into the skin every 8 (eight) hours.       hydrALAZINE (APRESOLINE) 100 MG tablet Take 1 tablet (100 mg total) by mouth every 8 (eight) hours. Hold for SBP < 120 and before dialysis 270 tablet 0 2/15/2025    hyoscyamine (LEVSIN/SL) 0.125 mg Subl Place 1 tablet (0.125 mg total) under the tongue every 4 (four) hours as needed (abdominal cramping). (Patient not taking: Reported on 2/13/2025) 20 tablet 0     isosorbide mononitrate (IMDUR) 120 MG 24 hr tablet Take 120 mg by mouth every morning.   2/15/2025    ketoconazole (NIZORAL) 2 % shampoo Apply topically every other day. (Patient not taking: Reported on 1/14/2025)       LIDOcaine-prilocaine (EMLA) cream Apply topically as needed (pre dialysis). 30 g 2     metoprolol succinate (TOPROL-XL) 50 MG 24 hr tablet Take 150 mg by mouth once daily.   2/15/2025    miconazole (MICOTIN) 2 % cream Apply topically 2 (two) times daily. (Patient not taking: Reported on 1/14/2025)       neomycin (MYCIFRADIN) 500 mg Tab On the day before surgery take 2 tablets at noon, then 2 tablets at 2pm, then 2 tablets at 10pm. 6 tablet 0     ondansetron (ZOFRAN-ODT) 4 MG TbDL Take 1 tablet (4 mg total) by mouth every 6 (six) hours as needed (nausea). (Patient not taking: Reported on 2/13/2025)       sevelamer carbonate (RENVELA) 2.4 gram PwPk Take by mouth.   2/15/2025

## 2025-02-17 NOTE — PLAN OF CARE
0735 Assumed care, vss, see assess. Nadn. Will monitor.   0815 Pt tolerating po fluids, pain controlled. All belongings returned to pt. D/c instructions given and explained to pt, pt v/u. D/c'd out to pov via wc per staff with nadn.

## 2025-02-17 NOTE — PROVATION PATIENT INSTRUCTIONS
Discharge Summary/Instructions after an Endoscopic Procedure  Patient Name: João Ham  Patient MRN: [id number]  Patient YOB: 1980 Monday, February 17, 2025  Galileo Connors MD  Dear patient,  As a result of recent federal legislation (The Federal Cures Act), you may   receive lab or pathology results from your procedure in your [facility]   account before your physician is able to contact you. Your physician or   their representative will relay the results to you with their   recommendations at their soonest availability.  Thank you,  RESTRICTIONS:  During your procedure today, you received medications for sedation.  These   medications may affect your judgment, balance and coordination.  Therefore,   for 24 hours, you have the following restrictions:   - DO NOT drive a car, operate machinery, make legal/financial decisions,   sign important papers or drink alcohol.    ACTIVITY:  Today: no heavy lifting, straining or running due to procedural   sedation/anesthesia.  The following day: return to full activity including work.  DIET:  Eat and drink normally unless instructed otherwise.     TREATMENT FOR COMMON SIDE EFFECTS:  - Mild abdominal pain, nausea, belching, bloating or excessive gas:  rest,   eat lightly and use a heating pad.  - Sore Throat: treat with throat lozenges and/or gargle with warm salt   water.  - Because air was used during the procedure, expelling large amounts of air   from your rectum or belching is normal.  - If a bowel prep was taken, you may not have a bowel movement for 1-3 days.    This is normal.  SYMPTOMS TO WATCH FOR AND REPORT TO YOUR PHYSICIAN:  1. Abdominal pain or bloating, other than gas cramps.  2. Chest pain.  3. Back pain.  4. Signs of infection such as: chills or fever occurring within 24 hours   after the procedure.  5. Rectal bleeding, which would show as bright red, maroon, or black stools.   (A tablespoon of blood from the rectum is not serious, especially if    hemorrhoids are present.)  6. Vomiting.  7. Weakness or dizziness.  GO DIRECTLY TO THE NEAREST EMERGENCY ROOM IF YOU HAVE ANY OF THE FOLLOWING:      Difficulty breathing              Chills and/or fever over 101 F   Persistent vomiting and/or vomiting blood   Severe abdominal pain   Severe chest pain   Black, tarry stools   Bleeding- more than one tablespoon   Any other symptom or condition that you feel may need urgent attention  Your doctor recommends these additional instructions:  If any biopsies were taken, your doctors clinic will contact you in 1 to 2   weeks with any results.  - Discharge patient to home (ambulatory).   - Resume regular diet.   - Return to my office PRN.  For questions, problems or results please call your physician - Galileo Connors MD at Work:  [phone].  [facility], EMERGENCY ROOM PHONE NUMBER: [phone]  IF A COMPLICATION OR EMERGENCY SITUATION ARISES AND YOU ARE UNABLE TO REACH   YOUR PHYSICIAN - GO DIRECTLY TO THE EMERGENCY ROOM.  Galileo Connors MD  2/17/2025 7:33:07 AM  This report has been verified and signed electronically.  Dear patient,  As a result of recent federal legislation (The Federal Cures Act), you may   receive lab or pathology results from your procedure in your [facility]   account before your physician is able to contact you. Your physician or   their representative will relay the results to you with their   recommendations at their soonest availability.  Thank you,  PROVATION

## 2025-02-17 NOTE — TRANSFER OF CARE
"Anesthesia Transfer of Care Note    Patient: João Ham    Procedure(s) Performed: Procedure(s) (LRB):  COLONOSCOPY (N/A)    Patient location: GI    Anesthesia Type: general    Transport from OR: Transported from OR on room air with adequate spontaneous ventilation    Post pain: adequate analgesia    Post assessment: no apparent anesthetic complications    Post vital signs: stable    Level of consciousness: lethargic and responds to stimulation    Nausea/Vomiting: no nausea/vomiting    Complications: none    Transfer of care protocol was followed      Last vitals: Visit Vitals  BP (!) 188/106 (BP Location: Left leg, Patient Position: Lying)   Pulse 84   Temp 36.8 °C (98.2 °F) (Skin)   Resp 16   Ht 6' 2" (1.88 m)   Wt 114.3 kg (252 lb)   SpO2 96%   BMI 32.35 kg/m²     "

## 2025-02-17 NOTE — H&P
[facility] - Endoscopy  History & Physical     Subjective:     Chief Complaint/Reason for Admission: history of colostomy    History of Present Illness:   Patient 44 y.o. male presents for colonoscopy.  Pt has colostomy in placed and is in need of colonoscopy prior to reversal.  Pt with PMHx significant for ESRD.      Patient Active Problem List    Diagnosis Date Noted    Open wound of abdomen 11/14/2024    Surgical wound dehiscence, subsequent encounter 10/25/2024    Anticoagulated 10/25/2024    History of ESBL E. coli infection 10/25/2024    Severe malnutrition 10/19/2024    Post-operative wound abscess 10/18/2024    ESRD on hemodialysis 10/18/2024    Post-op pain 10/18/2024    Bleeding from the nose 10/02/2024    Insomnia 10/01/2024    Debility 09/30/2024    Acute hypoxic respiratory failure 09/25/2024    Sepsis 09/25/2024    Patient on waiting list for kidney transplant 08/22/2024    Essential hypertension 07/17/2024    Bacteremia 07/15/2024    Sleep apnea 03/31/2023    Hyperparathyroidism 01/10/2023    Erectile dysfunction 01/10/2023    Pulmonary hypertension 11/29/2022    Hypertensive heart and renal disease with congestive heart failure 11/29/2022    Severe obesity with body mass index (BMI) of 35.0 to 39.9 with comorbidity 10/17/2022    ESRD (end stage renal disease) 10/10/2022    Anemia due to chronic kidney disease, on chronic dialysis 10/10/2022    Persistent proteinuria 10/10/2022        Prescriptions Prior to Admission[1]  Review of patient's allergies indicates:   Allergen Reactions    Mushroom Swelling     Tongue swells    Tongue swells       Tongue swells        Past Medical History:   Diagnosis Date    Allergy     Anemia     Anxiety     CHF (congestive heart failure)     Depression     Dialysis patient     High blood pressure with chronic kidney disease, stage 5 chronic kidney disease or end stage renal disease     Hypertension       Past Surgical History:   Procedure Laterality Date     ABSCESS DRAINAGE Left 9/17/2024    Procedure: DRAINAGE, ABSCESS, GROIN;  Surgeon: Galileo Connors MD;  Location: [facility] OR;  Service: General;  Laterality: Left;    COLON RESECTION  9/25/2024    Procedure: COLON RESECTION;  Surgeon: Galileo Connors MD;  Location: [facility] OR;  Service: General;;    FISTULOGRAM Bilateral 4/30/2024    Procedure: Fistulogram;  Surgeon: Khoobehi, Ali, MD;  Location: [facility] CATH/EP LAB;  Service: Vascular;  Laterality: Bilateral;    FISTULOGRAM Left 9/24/2024    Procedure: Fistulogram;  Surgeon: Lorraine Salvador MD;  Location: [facility] CATH/EP LAB;  Service: General;  Laterality: Left;    HERNIA REPAIR Right     With mesh    INSERTION, CATHETER, TUNNELED N/A 6/22/2023    Procedure: Insertion,catheter,tunneled;  Surgeon: Everett Caicedo MD;  Location: [facility] OR;  Service: General;  Laterality: N/A;    LAPAROTOMY, EXPLORATORY N/A 9/25/2024    Procedure: LAPAROTOMY, EXPLORATORY;  Surgeon: Galileo Connors MD;  Location: [facility] OR;  Service: General;  Laterality: N/A;    MOBILIZATION OF SPLENIC FLEXURE  9/25/2024    Procedure: MOBILIZATION, SPLENIC FLEXURE;  Surgeon: Galileo Connors MD;  Location: [facility] OR;  Service: General;;    PERCUTANEOUS TRANSLUMINAL ANGIOPLASTY OF ARTERIOVENOUS FISTULA Left 4/30/2024    Procedure: PTA, AV FISTULA;  Surgeon: Khoobehi, Ali, MD;  Location: [facility] CATH/EP LAB;  Service: Vascular;  Laterality: Left;    PERCUTANEOUS TRANSLUMINAL ANGIOPLASTY OF ARTERIOVENOUS FISTULA Left 9/24/2024    Procedure: PTA, AV FISTULA;  Surgeon: Lorraine Salvador MD;  Location: [facility] CATH/EP LAB;  Service: General;  Laterality: Left;    PHLEBOGRAPHY N/A 7/19/2024    Procedure: Venogram;  Surgeon: Khoobehi, Ali, MD;  Location: [facility] CATH/EP LAB;  Service: Vascular;  Laterality: N/A;    REMOVAL, TUNNELED CATH Right 7/19/2024    Procedure: REMOVAL, TUNNELED CATH;  Surgeon: Khoobehi, Ali, MD;  Location: [facility] CATH/EP LAB;  Service: Vascular;  Laterality: Right;    ROBOT-ASSISTED LAPAROSCOPIC RESECTION OF  "SIGMOID COLON USING DA DELANO XI N/A 9/17/2024    Procedure: XI ROBOTIC SIGMOID RESECTION, WITH ANASTAMOSIS;  Surgeon: Galileo Connors MD;  Location: [facility] OR;  Service: General;  Laterality: N/A;  possible open have instruments available        Social History     Tobacco Use    Smoking status: Never     Passive exposure: Never    Smokeless tobacco: Never   Substance Use Topics    Alcohol use: Never        Review of Systems:  A comprehensive review of systems was negative.    OBJECTIVE:     Patient Vitals for the past 8 hrs:   BP Temp Temp src Pulse Resp SpO2 Height Weight   02/17/25 0655 (!) 188/106 98.2 °F (36.8 °C) Skin 84 16 96 % 6' 2" (1.88 m) 114.3 kg (252 lb)     AAOx3  Soft/nd/nt  No resp distress      Data Review:      ASSESSMENT/PLAN:     There are no hospital problems to display for this patient.  Screening cscope    Plan:  TO have cscope         [1]   Medications Prior to Admission   Medication Sig Dispense Refill Last Dose/Taking    olmesartan (BENICAR) 40 MG tablet Take 1 tablet (40 mg total) by mouth once daily. 90 tablet 0 2/13/2025    ALPRAZolam (XANAX) 0.25 MG tablet Take 1 tablet (0.25 mg total) by mouth nightly as needed for Insomnia. (Patient not taking: Reported on 10/22/2024)       apixaban (ELIQUIS) 2.5 mg Tab Take 1 tablet (2.5 mg total) by mouth 2 (two) times daily.   2/13/2025    calcitRIOL (ROCALTROL) 0.25 MCG Cap Take 1 capsule (0.25 mcg total) by mouth once daily. (Patient not taking: Reported on 1/14/2025)       calcium carbonate (TUMS) 200 mg calcium (500 mg) chewable tablet Take 2 tablets (1,000 mg total) by mouth 3 (three) times daily. (Patient not taking: Reported on 1/14/2025) 180 tablet 11     cholecalciferol, vitamin D3, 125 mcg (5,000 unit) Tab Take 5,000 Units by mouth every morning. (Patient not taking: Reported on 1/14/2025)       cloNIDine (CATAPRES) 0.3 MG tablet Take 1 tablet (0.3 mg total) by mouth 3 (three) times daily as needed (SBP > 150).       dicyclomine (BENTYL) " 10 MG capsule Take 1 capsule (10 mg total) by mouth 3 (three) times daily as needed (crampy abdominal pain). (Patient not taking: Reported on 1/14/2025) 90 capsule 0     epoetin mily-epbx (RETACRIT) 20,000 unit/mL injection Inject 0.67 mLs (13,400 Units total) into the skin every Mon, Wed, Fri.       heparin sodium,porcine (HEPARIN, PORCINE,) 1,000 unit/mL injection Inject 4 mLs (4,000 Units total) into the vein as needed (line care after dialysis).       heparin sodium,porcine (HEPARIN, PORCINE,) 5,000 unit/mL injection Inject 1 mL (5,000 Units total) into the skin every 8 (eight) hours.       hydrALAZINE (APRESOLINE) 100 MG tablet Take 1 tablet (100 mg total) by mouth every 8 (eight) hours. Hold for SBP < 120 and before dialysis 270 tablet 0 2/15/2025    hyoscyamine (LEVSIN/SL) 0.125 mg Subl Place 1 tablet (0.125 mg total) under the tongue every 4 (four) hours as needed (abdominal cramping). (Patient not taking: Reported on 2/13/2025) 20 tablet 0     isosorbide mononitrate (IMDUR) 120 MG 24 hr tablet Take 120 mg by mouth every morning.   2/15/2025    ketoconazole (NIZORAL) 2 % shampoo Apply topically every other day. (Patient not taking: Reported on 1/14/2025)       LIDOcaine-prilocaine (EMLA) cream Apply topically as needed (pre dialysis). 30 g 2     metoprolol succinate (TOPROL-XL) 50 MG 24 hr tablet Take 150 mg by mouth once daily.   2/15/2025    miconazole (MICOTIN) 2 % cream Apply topically 2 (two) times daily. (Patient not taking: Reported on 1/14/2025)       neomycin (MYCIFRADIN) 500 mg Tab On the day before surgery take 2 tablets at noon, then 2 tablets at 2pm, then 2 tablets at 10pm. 6 tablet 0     ondansetron (ZOFRAN-ODT) 4 MG TbDL Take 1 tablet (4 mg total) by mouth every 6 (six) hours as needed (nausea). (Patient not taking: Reported on 2/13/2025)       sevelamer carbonate (RENVELA) 2.4 gram PwPk Take by mouth.   2/15/2025

## 2025-02-17 NOTE — ANESTHESIA PREPROCEDURE EVALUATION
02/17/2025  João Ham is a 44 y.o., male.      Pre-op Assessment    I have reviewed the Patient Summary Reports.     I have reviewed the Nursing Notes. I have reviewed the NPO Status.   I have reviewed the Medications.     Review of Systems  Anesthesia Hx:  No problems with previous Anesthesia                Social:  Non-Smoker       Cardiovascular:  Exercise tolerance: good   Hypertension       CHF                Congestive Heart Failure (CHF)                Hypertension         Pulmonary:        Sleep Apnea, CPAP     Obstructive Sleep Apnea (CLOVIS).      Education provided regarding risk of obstructive sleep apnea            Renal/:  Chronic Renal Disease        Kidney Function/Disease             Hepatic/GI:  Bowel Prep.                   Neurological:    Neuromuscular Disease,                                 Neuromuscular Disease   Endocrine:        Obesity / BMI > 30  Psych:  Psychiatric History                  Physical Exam  General: Well nourished, Cooperative, Alert and Oriented    Airway:  Mallampati: II   Mouth Opening: Normal  TM Distance: Normal  Tongue: Normal  Neck ROM: Normal ROM    Dental:  Intact    Chest/Lungs:  Clear to auscultation, Normal Respiratory Rate    Heart:  Rate: Normal        Anesthesia Plan  Type of Anesthesia, risks & benefits discussed:    Anesthesia Type: Gen Natural Airway  Intra-op Monitoring Plan: Standard ASA Monitors  Induction:  IV  Informed Consent: Informed consent signed with the Patient and all parties understand the risks and agree with anesthesia plan.  All questions answered.   ASA Score: 3  Day of Surgery Review of History & Physical: H&P Update referred to the surgeon/provider.    Ready For Surgery From Anesthesia Perspective.     .

## 2025-02-17 NOTE — ANESTHESIA POSTPROCEDURE EVALUATION
Anesthesia Post Evaluation    Patient: João Ham    Procedure(s) Performed: Procedure(s) (LRB):  COLONOSCOPY (N/A)    Final Anesthesia Type: general      Patient location during evaluation: PACU  Patient participation: Yes- Able to Participate  Level of consciousness: awake and alert  Post-procedure vital signs: reviewed and stable  Pain management: adequate  Airway patency: patent    PONV status at discharge: No PONV  Anesthetic complications: no      Cardiovascular status: hemodynamically stable  Respiratory status: unassisted and room air  Hydration status: euvolemic  Follow-up not needed.              Vitals Value Taken Time   /94 02/17/25 07:50   Temp 36.9 °C (98.4 °F) 02/17/25 07:50   Pulse 80 02/17/25 07:50   Resp 16 02/17/25 07:50   SpO2 96 % 02/17/25 07:50         Event Time   Out of Recovery 08:15:00         Pain/Evans Score: Evans Score: 10 (2/17/2025  8:00 AM)

## 2025-02-18 VITALS
HEIGHT: 74 IN | HEART RATE: 80 BPM | RESPIRATION RATE: 16 BRPM | TEMPERATURE: 98 F | OXYGEN SATURATION: 96 % | DIASTOLIC BLOOD PRESSURE: 94 MMHG | BODY MASS INDEX: 32.34 KG/M2 | WEIGHT: 252 LBS | SYSTOLIC BLOOD PRESSURE: 176 MMHG

## 2025-02-18 NOTE — PROGRESS NOTES
2/18 Pt has concerns of receiving blood without his knowledge while in surgery. Explained that blood is only given in the event of an emergency to save his life. Explained to him to discuss his concerns with Dr Connors and the anesthesia MD the morning of surgery, v/u.

## 2025-02-19 ENCOUNTER — PATIENT MESSAGE (OUTPATIENT)
Dept: SURGERY | Facility: CLINIC | Age: 45
End: 2025-02-19
Payer: COMMERCIAL

## 2025-02-21 DIAGNOSIS — Z93.3 COLOSTOMY STATUS: Primary | ICD-10-CM

## 2025-02-23 RX ORDER — NEOMYCIN SULFATE 500 MG/1
TABLET ORAL
Qty: 6 TABLET | Refills: 0 | Status: ON HOLD | OUTPATIENT
Start: 2025-02-23 | End: 2025-02-28 | Stop reason: HOSPADM

## 2025-02-24 ENCOUNTER — ANESTHESIA (OUTPATIENT)
Dept: SURGERY | Facility: HOSPITAL | Age: 45
End: 2025-02-24
Payer: COMMERCIAL

## 2025-02-24 ENCOUNTER — HOSPITAL ENCOUNTER (INPATIENT)
Facility: HOSPITAL | Age: 45
LOS: 4 days | Discharge: HOME | DRG: 329 | End: 2025-02-28
Attending: SURGERY | Admitting: SURGERY
Payer: COMMERCIAL

## 2025-02-24 DIAGNOSIS — Z99.2 ESRD ON HEMODIALYSIS: Primary | ICD-10-CM

## 2025-02-24 DIAGNOSIS — Z93.3 COLOSTOMY STATUS: ICD-10-CM

## 2025-02-24 DIAGNOSIS — N18.6 ESRD (END STAGE RENAL DISEASE): ICD-10-CM

## 2025-02-24 DIAGNOSIS — Z01.818 PREOP TESTING: ICD-10-CM

## 2025-02-24 DIAGNOSIS — Z01.810 PREOP CARDIOVASCULAR EXAM: ICD-10-CM

## 2025-02-24 DIAGNOSIS — N18.6 ESRD ON HEMODIALYSIS: Primary | ICD-10-CM

## 2025-02-24 DIAGNOSIS — Z98.890 HISTORY OF COLOSTOMY REVERSAL: ICD-10-CM

## 2025-02-24 LAB
APTT PPP: 29.1 SEC (ref 21–32)
INR PPP: 1 (ref 0.8–1.2)
POTASSIUM SERPL-SCNC: 3.9 MMOL/L (ref 3.5–5.1)
PROTHROMBIN TIME: 11.6 SEC (ref 9–12.5)

## 2025-02-24 PROCEDURE — 37000009 HC ANESTHESIA EA ADD 15 MINS: Performed by: SURGERY

## 2025-02-24 PROCEDURE — 37000008 HC ANESTHESIA 1ST 15 MINUTES: Performed by: SURGERY

## 2025-02-24 PROCEDURE — 94761 N-INVAS EAR/PLS OXIMETRY MLT: CPT

## 2025-02-24 PROCEDURE — 36000708 HC OR TIME LEV III 1ST 15 MIN: Performed by: SURGERY

## 2025-02-24 PROCEDURE — 63600175 PHARM REV CODE 636 W HCPCS: Mod: TB,JZ | Performed by: SURGERY

## 2025-02-24 PROCEDURE — 25000003 PHARM REV CODE 250: Performed by: SURGERY

## 2025-02-24 PROCEDURE — 63600175 PHARM REV CODE 636 W HCPCS: Performed by: SURGERY

## 2025-02-24 PROCEDURE — 84132 ASSAY OF SERUM POTASSIUM: CPT | Performed by: ANESTHESIOLOGY

## 2025-02-24 PROCEDURE — 36000709 HC OR TIME LEV III EA ADD 15 MIN: Performed by: SURGERY

## 2025-02-24 PROCEDURE — 11000001 HC ACUTE MED/SURG PRIVATE ROOM

## 2025-02-24 PROCEDURE — 27201423 OPTIME MED/SURG SUP & DEVICES STERILE SUPPLY: Performed by: SURGERY

## 2025-02-24 PROCEDURE — 63600175 PHARM REV CODE 636 W HCPCS: Performed by: NURSE ANESTHETIST, CERTIFIED REGISTERED

## 2025-02-24 PROCEDURE — 27000221 HC OXYGEN, UP TO 24 HOURS

## 2025-02-24 PROCEDURE — 71000033 HC RECOVERY, INTIAL HOUR: Performed by: SURGERY

## 2025-02-24 PROCEDURE — 0DJ08ZZ INSPECTION OF UPPER INTESTINAL TRACT, VIA NATURAL OR ARTIFICIAL OPENING ENDOSCOPIC: ICD-10-PCS | Performed by: SURGERY

## 2025-02-24 PROCEDURE — 63600175 PHARM REV CODE 636 W HCPCS: Performed by: ANESTHESIOLOGY

## 2025-02-24 PROCEDURE — 99900035 HC TECH TIME PER 15 MIN (STAT)

## 2025-02-24 PROCEDURE — 63600175 PHARM REV CODE 636 W HCPCS: Mod: JZ,TB | Performed by: SURGERY

## 2025-02-24 PROCEDURE — 0DBP0ZZ EXCISION OF RECTUM, OPEN APPROACH: ICD-10-PCS | Performed by: SURGERY

## 2025-02-24 PROCEDURE — P9045 ALBUMIN (HUMAN), 5%, 250 ML: HCPCS | Mod: JZ,TB | Performed by: NURSE ANESTHETIST, CERTIFIED REGISTERED

## 2025-02-24 PROCEDURE — 63600175 PHARM REV CODE 636 W HCPCS: Mod: TB,JZ | Performed by: NURSE PRACTITIONER

## 2025-02-24 PROCEDURE — 25000003 PHARM REV CODE 250: Performed by: NURSE ANESTHETIST, CERTIFIED REGISTERED

## 2025-02-24 PROCEDURE — 63600175 PHARM REV CODE 636 W HCPCS: Mod: TB,JZ

## 2025-02-24 PROCEDURE — 36415 COLL VENOUS BLD VENIPUNCTURE: CPT | Performed by: ANESTHESIOLOGY

## 2025-02-24 PROCEDURE — 94799 UNLISTED PULMONARY SVC/PX: CPT

## 2025-02-24 PROCEDURE — 44626 REPAIR BOWEL OPENING: CPT | Mod: ,,, | Performed by: SURGERY

## 2025-02-24 PROCEDURE — 71000039 HC RECOVERY, EACH ADD'L HOUR: Performed by: SURGERY

## 2025-02-24 PROCEDURE — 85730 THROMBOPLASTIN TIME PARTIAL: CPT | Performed by: ANESTHESIOLOGY

## 2025-02-24 PROCEDURE — C1765 ADHESION BARRIER: HCPCS | Performed by: SURGERY

## 2025-02-24 PROCEDURE — 25000003 PHARM REV CODE 250: Performed by: ANESTHESIOLOGY

## 2025-02-24 PROCEDURE — 0DTJ0ZZ RESECTION OF APPENDIX, OPEN APPROACH: ICD-10-PCS | Performed by: SURGERY

## 2025-02-24 PROCEDURE — 85610 PROTHROMBIN TIME: CPT | Performed by: ANESTHESIOLOGY

## 2025-02-24 PROCEDURE — 25000003 PHARM REV CODE 250: Performed by: NURSE PRACTITIONER

## 2025-02-24 PROCEDURE — 0DBE0ZZ EXCISION OF LARGE INTESTINE, OPEN APPROACH: ICD-10-PCS | Performed by: SURGERY

## 2025-02-24 DEVICE — SEPRAFILM ADHESION BARRIER (MEMBRANE) IS A STERILE, BIORESORBABLE, TRANSLUCENT ADHESION BARRIER COMPOSED OF TWO ANIONIC POLYSACCHARIDES, SODIUM HYALURONATE (HA) AND CARBOXYMETHYLCELLULOSE (CMC).
Type: IMPLANTABLE DEVICE | Site: ABDOMEN | Status: FUNCTIONAL
Brand: SEPRAFILM

## 2025-02-24 RX ORDER — PROPOFOL 10 MG/ML
VIAL (ML) INTRAVENOUS
Status: DISCONTINUED | OUTPATIENT
Start: 2025-02-24 | End: 2025-02-24

## 2025-02-24 RX ORDER — METOPROLOL SUCCINATE 50 MG/1
150 TABLET, EXTENDED RELEASE ORAL DAILY
Status: DISCONTINUED | OUTPATIENT
Start: 2025-02-25 | End: 2025-02-28 | Stop reason: HOSPADM

## 2025-02-24 RX ORDER — ONDANSETRON HYDROCHLORIDE 2 MG/ML
INJECTION, SOLUTION INTRAVENOUS
Status: DISCONTINUED | OUTPATIENT
Start: 2025-02-24 | End: 2025-02-24

## 2025-02-24 RX ORDER — PHENYLEPHRINE HYDROCHLORIDE 10 MG/ML
INJECTION INTRAVENOUS
Status: DISCONTINUED | OUTPATIENT
Start: 2025-02-24 | End: 2025-02-24

## 2025-02-24 RX ORDER — EPHEDRINE SULFATE 50 MG/ML
INJECTION, SOLUTION INTRAVENOUS
Status: DISCONTINUED | OUTPATIENT
Start: 2025-02-24 | End: 2025-02-24

## 2025-02-24 RX ORDER — HYDROMORPHONE HYDROCHLORIDE 1 MG/ML
1 INJECTION, SOLUTION INTRAMUSCULAR; INTRAVENOUS; SUBCUTANEOUS EVERY 4 HOURS PRN
Status: DISCONTINUED | OUTPATIENT
Start: 2025-02-24 | End: 2025-02-25

## 2025-02-24 RX ORDER — HYDROMORPHONE HYDROCHLORIDE 2 MG/ML
INJECTION, SOLUTION INTRAMUSCULAR; INTRAVENOUS; SUBCUTANEOUS
Status: DISCONTINUED | OUTPATIENT
Start: 2025-02-24 | End: 2025-02-24

## 2025-02-24 RX ORDER — GUAIFENESIN 100 MG/5ML
200 LIQUID ORAL EVERY 4 HOURS PRN
Status: DISCONTINUED | OUTPATIENT
Start: 2025-02-25 | End: 2025-02-28 | Stop reason: HOSPADM

## 2025-02-24 RX ORDER — OXYCODONE HYDROCHLORIDE 5 MG/1
5 TABLET ORAL EVERY 4 HOURS PRN
Status: DISCONTINUED | OUTPATIENT
Start: 2025-02-24 | End: 2025-02-25

## 2025-02-24 RX ORDER — BUPIVACAINE 13.3 MG/ML
INJECTION, SUSPENSION, LIPOSOMAL INFILTRATION CODE/TRAUMA/SEDATION MEDICATION
Status: DISCONTINUED | OUTPATIENT
Start: 2025-02-24 | End: 2025-02-24 | Stop reason: HOSPADM

## 2025-02-24 RX ORDER — ONDANSETRON HYDROCHLORIDE 2 MG/ML
4 INJECTION, SOLUTION INTRAVENOUS ONCE AS NEEDED
Status: DISCONTINUED | OUTPATIENT
Start: 2025-02-24 | End: 2025-02-24 | Stop reason: HOSPADM

## 2025-02-24 RX ORDER — VASOPRESSIN 20 [USP'U]/ML
INJECTION, SOLUTION INTRAMUSCULAR; SUBCUTANEOUS
Status: DISCONTINUED | OUTPATIENT
Start: 2025-02-24 | End: 2025-02-24

## 2025-02-24 RX ORDER — CEFTRIAXONE 2 G/1
2 INJECTION, POWDER, FOR SOLUTION INTRAMUSCULAR; INTRAVENOUS
Status: COMPLETED | OUTPATIENT
Start: 2025-02-24 | End: 2025-02-24

## 2025-02-24 RX ORDER — DIPHENHYDRAMINE HYDROCHLORIDE 50 MG/ML
12.5 INJECTION, SOLUTION INTRAMUSCULAR; INTRAVENOUS EVERY 6 HOURS PRN
Status: DISCONTINUED | OUTPATIENT
Start: 2025-02-24 | End: 2025-02-28 | Stop reason: HOSPADM

## 2025-02-24 RX ORDER — MUPIROCIN 20 MG/G
OINTMENT TOPICAL 2 TIMES DAILY
Status: DISCONTINUED | OUTPATIENT
Start: 2025-02-24 | End: 2025-02-28 | Stop reason: HOSPADM

## 2025-02-24 RX ORDER — BISACODYL 5 MG
5 TABLET, DELAYED RELEASE (ENTERIC COATED) ORAL NIGHTLY
Status: DISCONTINUED | OUTPATIENT
Start: 2025-02-24 | End: 2025-02-28

## 2025-02-24 RX ORDER — LORAZEPAM 2 MG/ML
0.25 INJECTION INTRAMUSCULAR EVERY 4 HOURS PRN
Status: DISCONTINUED | OUTPATIENT
Start: 2025-02-24 | End: 2025-02-28 | Stop reason: HOSPADM

## 2025-02-24 RX ORDER — ALBUMIN HUMAN 50 G/1000ML
SOLUTION INTRAVENOUS
Status: DISCONTINUED | OUTPATIENT
Start: 2025-02-24 | End: 2025-02-24

## 2025-02-24 RX ORDER — NALOXONE HCL 0.4 MG/ML
0.02 VIAL (ML) INJECTION
Status: DISCONTINUED | OUTPATIENT
Start: 2025-02-24 | End: 2025-02-28 | Stop reason: HOSPADM

## 2025-02-24 RX ORDER — SODIUM CHLORIDE 9 MG/ML
INJECTION, SOLUTION INTRAVENOUS CONTINUOUS
Status: DISCONTINUED | OUTPATIENT
Start: 2025-02-24 | End: 2025-02-24

## 2025-02-24 RX ORDER — FENTANYL CITRATE 50 UG/ML
25 INJECTION, SOLUTION INTRAMUSCULAR; INTRAVENOUS EVERY 5 MIN PRN
Status: DISCONTINUED | OUTPATIENT
Start: 2025-02-24 | End: 2025-02-24 | Stop reason: HOSPADM

## 2025-02-24 RX ORDER — ACETAMINOPHEN 10 MG/ML
INJECTION, SOLUTION INTRAVENOUS
Status: DISCONTINUED | OUTPATIENT
Start: 2025-02-24 | End: 2025-02-24

## 2025-02-24 RX ORDER — ROCURONIUM BROMIDE 10 MG/ML
INJECTION, SOLUTION INTRAVENOUS
Status: DISCONTINUED | OUTPATIENT
Start: 2025-02-24 | End: 2025-02-24

## 2025-02-24 RX ORDER — LIDOCAINE HYDROCHLORIDE 10 MG/ML
1 INJECTION, SOLUTION EPIDURAL; INFILTRATION; INTRACAUDAL; PERINEURAL ONCE
Status: DISCONTINUED | OUTPATIENT
Start: 2025-02-24 | End: 2025-02-24 | Stop reason: HOSPADM

## 2025-02-24 RX ORDER — METRONIDAZOLE 500 MG/100ML
500 INJECTION, SOLUTION INTRAVENOUS
Status: COMPLETED | OUTPATIENT
Start: 2025-02-24 | End: 2025-02-25

## 2025-02-24 RX ORDER — CEFAZOLIN 2 G/1
2 INJECTION, POWDER, FOR SOLUTION INTRAMUSCULAR; INTRAVENOUS
Status: COMPLETED | OUTPATIENT
Start: 2025-02-24 | End: 2025-02-25

## 2025-02-24 RX ORDER — ENOXAPARIN SODIUM 100 MG/ML
40 INJECTION SUBCUTANEOUS EVERY 24 HOURS
Status: DISCONTINUED | OUTPATIENT
Start: 2025-02-24 | End: 2025-02-25

## 2025-02-24 RX ORDER — ONDANSETRON HYDROCHLORIDE 2 MG/ML
4 INJECTION, SOLUTION INTRAVENOUS EVERY 6 HOURS PRN
Status: DISCONTINUED | OUTPATIENT
Start: 2025-02-24 | End: 2025-02-28 | Stop reason: HOSPADM

## 2025-02-24 RX ORDER — GABAPENTIN 100 MG/1
200 CAPSULE ORAL 2 TIMES DAILY
Status: DISCONTINUED | OUTPATIENT
Start: 2025-02-24 | End: 2025-02-28

## 2025-02-24 RX ORDER — OXYCODONE HYDROCHLORIDE 5 MG/1
5 TABLET ORAL ONCE AS NEEDED
Status: COMPLETED | OUTPATIENT
Start: 2025-02-24 | End: 2025-02-24

## 2025-02-24 RX ORDER — IBUPROFEN 600 MG/1
600 TABLET ORAL 4 TIMES DAILY
Status: DISCONTINUED | OUTPATIENT
Start: 2025-02-24 | End: 2025-02-25

## 2025-02-24 RX ORDER — BUPIVACAINE HYDROCHLORIDE AND EPINEPHRINE 5; 5 MG/ML; UG/ML
INJECTION, SOLUTION EPIDURAL; INTRACAUDAL; PERINEURAL CODE/TRAUMA/SEDATION MEDICATION
Status: DISCONTINUED | OUTPATIENT
Start: 2025-02-24 | End: 2025-02-24 | Stop reason: HOSPADM

## 2025-02-24 RX ORDER — MIDAZOLAM HYDROCHLORIDE 1 MG/ML
INJECTION INTRAMUSCULAR; INTRAVENOUS
Status: DISCONTINUED | OUTPATIENT
Start: 2025-02-24 | End: 2025-02-24

## 2025-02-24 RX ORDER — SUCCINYLCHOLINE CHLORIDE 20 MG/ML
INJECTION INTRAMUSCULAR; INTRAVENOUS
Status: DISCONTINUED | OUTPATIENT
Start: 2025-02-24 | End: 2025-02-24

## 2025-02-24 RX ORDER — METRONIDAZOLE 500 MG/100ML
500 INJECTION, SOLUTION INTRAVENOUS
Status: COMPLETED | OUTPATIENT
Start: 2025-02-24 | End: 2025-02-24

## 2025-02-24 RX ORDER — HYDROMORPHONE HYDROCHLORIDE 2 MG/ML
2 INJECTION, SOLUTION INTRAMUSCULAR; INTRAVENOUS; SUBCUTANEOUS ONCE
Status: COMPLETED | OUTPATIENT
Start: 2025-02-24 | End: 2025-02-24

## 2025-02-24 RX ORDER — FENTANYL CITRATE 50 UG/ML
INJECTION, SOLUTION INTRAMUSCULAR; INTRAVENOUS
Status: DISCONTINUED | OUTPATIENT
Start: 2025-02-24 | End: 2025-02-24

## 2025-02-24 RX ORDER — DEXTROSE, SODIUM CHLORIDE, SODIUM LACTATE, POTASSIUM CHLORIDE, AND CALCIUM CHLORIDE 5; .6; .31; .03; .02 G/100ML; G/100ML; G/100ML; G/100ML; G/100ML
INJECTION, SOLUTION INTRAVENOUS CONTINUOUS
Status: CANCELLED | OUTPATIENT
Start: 2025-02-24

## 2025-02-24 RX ORDER — DEXAMETHASONE SODIUM PHOSPHATE 4 MG/ML
INJECTION, SOLUTION INTRA-ARTICULAR; INTRALESIONAL; INTRAMUSCULAR; INTRAVENOUS; SOFT TISSUE
Status: DISCONTINUED | OUTPATIENT
Start: 2025-02-24 | End: 2025-02-24

## 2025-02-24 RX ORDER — LIDOCAINE HYDROCHLORIDE 20 MG/ML
INJECTION INTRAVENOUS
Status: DISCONTINUED | OUTPATIENT
Start: 2025-02-24 | End: 2025-02-24

## 2025-02-24 RX ORDER — ONDANSETRON HYDROCHLORIDE 2 MG/ML
4 INJECTION, SOLUTION INTRAVENOUS EVERY 12 HOURS PRN
Status: DISCONTINUED | OUTPATIENT
Start: 2025-02-24 | End: 2025-02-24 | Stop reason: SDUPTHER

## 2025-02-24 RX ORDER — BENZONATATE 100 MG/1
200 CAPSULE ORAL 3 TIMES DAILY PRN
Status: DISCONTINUED | OUTPATIENT
Start: 2025-02-25 | End: 2025-02-28 | Stop reason: HOSPADM

## 2025-02-24 RX ADMIN — FENTANYL CITRATE 25 MCG: 50 INJECTION, SOLUTION INTRAMUSCULAR; INTRAVENOUS at 04:02

## 2025-02-24 RX ADMIN — MIDAZOLAM HYDROCHLORIDE 4 MG: 1 INJECTION, SOLUTION INTRAMUSCULAR; INTRAVENOUS at 11:02

## 2025-02-24 RX ADMIN — OXYCODONE 5 MG: 5 TABLET ORAL at 03:02

## 2025-02-24 RX ADMIN — VASOPRESSIN 1 UNITS: 20 INJECTION, SOLUTION INTRAMUSCULAR; SUBCUTANEOUS at 01:02

## 2025-02-24 RX ADMIN — IBUPROFEN 600 MG: 600 TABLET ORAL at 04:02

## 2025-02-24 RX ADMIN — PHENYLEPHRINE HYDROCHLORIDE 100 MCG: 10 INJECTION INTRAVENOUS at 01:02

## 2025-02-24 RX ADMIN — EPHEDRINE SULFATE 15 MG: 50 INJECTION, SOLUTION INTRAMUSCULAR; INTRAVENOUS; SUBCUTANEOUS at 02:02

## 2025-02-24 RX ADMIN — LIDOCAINE HYDROCHLORIDE 100 MG: 20 INJECTION, SOLUTION INTRAVENOUS at 11:02

## 2025-02-24 RX ADMIN — CEFTRIAXONE 2 G: 2 INJECTION, POWDER, FOR SOLUTION INTRAMUSCULAR; INTRAVENOUS at 11:02

## 2025-02-24 RX ADMIN — ONDANSETRON 4 MG: 2 INJECTION INTRAMUSCULAR; INTRAVENOUS at 11:02

## 2025-02-24 RX ADMIN — PHENYLEPHRINE HYDROCHLORIDE 200 MCG: 10 INJECTION INTRAVENOUS at 11:02

## 2025-02-24 RX ADMIN — ALBUMIN (HUMAN) 250 ML: 12.5 SOLUTION INTRAVENOUS at 12:02

## 2025-02-24 RX ADMIN — EPHEDRINE SULFATE 15 MG: 50 INJECTION, SOLUTION INTRAMUSCULAR; INTRAVENOUS; SUBCUTANEOUS at 01:02

## 2025-02-24 RX ADMIN — PHENYLEPHRINE HYDROCHLORIDE 200 MCG: 10 INJECTION INTRAVENOUS at 01:02

## 2025-02-24 RX ADMIN — HYDROMORPHONE HYDROCHLORIDE 0.4 MG: 2 INJECTION INTRAMUSCULAR; INTRAVENOUS; SUBCUTANEOUS at 02:02

## 2025-02-24 RX ADMIN — VASOPRESSIN 1 UNITS: 20 INJECTION, SOLUTION INTRAMUSCULAR; SUBCUTANEOUS at 12:02

## 2025-02-24 RX ADMIN — HYDROMORPHONE HYDROCHLORIDE 2 MG: 2 INJECTION, SOLUTION INTRAMUSCULAR; INTRAVENOUS; SUBCUTANEOUS at 11:02

## 2025-02-24 RX ADMIN — BISACODYL 5 MG: 5 TABLET, COATED ORAL at 08:02

## 2025-02-24 RX ADMIN — METRONIDAZOLE 500 MG: 5 INJECTION, SOLUTION INTRAVENOUS at 08:02

## 2025-02-24 RX ADMIN — HYDROMORPHONE HYDROCHLORIDE 1 MG: 1 INJECTION, SOLUTION INTRAMUSCULAR; INTRAVENOUS; SUBCUTANEOUS at 06:02

## 2025-02-24 RX ADMIN — SUCCINYLCHOLINE CHLORIDE 140 MG: 20 INJECTION, SOLUTION INTRAMUSCULAR; INTRAVENOUS at 11:02

## 2025-02-24 RX ADMIN — IBUPROFEN 600 MG: 600 TABLET ORAL at 08:02

## 2025-02-24 RX ADMIN — METRONIDAZOLE 500 MG: 500 INJECTION, SOLUTION INTRAVENOUS at 11:02

## 2025-02-24 RX ADMIN — GUAIFENESIN 200 MG: 200 SOLUTION ORAL at 11:02

## 2025-02-24 RX ADMIN — ONDANSETRON 4 MG: 2 INJECTION INTRAMUSCULAR; INTRAVENOUS at 02:02

## 2025-02-24 RX ADMIN — CEFAZOLIN 2 G: 2 INJECTION, POWDER, FOR SOLUTION INTRAMUSCULAR; INTRAVENOUS at 08:02

## 2025-02-24 RX ADMIN — GABAPENTIN 200 MG: 100 CAPSULE ORAL at 08:02

## 2025-02-24 RX ADMIN — FENTANYL CITRATE 50 MCG: 50 INJECTION, SOLUTION INTRAMUSCULAR; INTRAVENOUS at 11:02

## 2025-02-24 RX ADMIN — FENTANYL CITRATE 50 MCG: 50 INJECTION, SOLUTION INTRAMUSCULAR; INTRAVENOUS at 02:02

## 2025-02-24 RX ADMIN — MUPIROCIN 1 G: 20 OINTMENT TOPICAL at 08:02

## 2025-02-24 RX ADMIN — EPHEDRINE SULFATE 10 MG: 50 INJECTION, SOLUTION INTRAMUSCULAR; INTRAVENOUS; SUBCUTANEOUS at 12:02

## 2025-02-24 RX ADMIN — HYDROMORPHONE HYDROCHLORIDE 2 MG: 2 INJECTION, SOLUTION INTRAMUSCULAR; INTRAVENOUS; SUBCUTANEOUS at 07:02

## 2025-02-24 RX ADMIN — DEXAMETHASONE SODIUM PHOSPHATE 4 MG: 4 INJECTION, SOLUTION INTRA-ARTICULAR; INTRALESIONAL; INTRAMUSCULAR; INTRAVENOUS; SOFT TISSUE at 11:02

## 2025-02-24 RX ADMIN — SODIUM CHLORIDE: 9 INJECTION, SOLUTION INTRAVENOUS at 10:02

## 2025-02-24 RX ADMIN — ACETAMINOPHEN 1000 MG: 10 INJECTION INTRAVENOUS at 12:02

## 2025-02-24 RX ADMIN — SUGAMMADEX 200 MG: 100 INJECTION, SOLUTION INTRAVENOUS at 02:02

## 2025-02-24 RX ADMIN — PROPOFOL 150 MG: 10 INJECTION, EMULSION INTRAVENOUS at 11:02

## 2025-02-24 RX ADMIN — ROCURONIUM BROMIDE 20 MG: 10 INJECTION, SOLUTION INTRAVENOUS at 01:02

## 2025-02-24 RX ADMIN — ROCURONIUM BROMIDE 30 MG: 10 INJECTION, SOLUTION INTRAVENOUS at 11:02

## 2025-02-24 RX ADMIN — SODIUM CHLORIDE: 0.9 INJECTION, SOLUTION INTRAVENOUS at 11:02

## 2025-02-24 NOTE — CONSULTS
[facility] Medicine  Consult Note    Patient Name: João Ham  MRN: [id number]  Admission Date: 2/24/2025  Hospital Length of Stay: 0 days  Attending Physician: Forrest Mireles MD  Primary Care Provider: Dawson Granado MD           Patient information was obtained from ER records.     Consults  Subjective:     Principal Problem: Colostomy status    Chief Complaint: No chief complaint on file.       HPI: 44-year-old male with a history of ESRD on hemodialysis awaiting for renal transplant, hypertension, chronic colostomy status presenting here for colostomy reversal.  On 9/2024, patient was hospitalized for inguinal hernia gangrene.  Found to have colonic perforation due to mesh erosion with an abscess.  Patient had sigmoidectomy with colostomy since then.  Now patient is presenting for a colostomy reversal, prior to this surgery patient had colonoscopy through the stoma by Dr. Connors which is unremarkable.  Patient is seen and examined in the PACU, still lethargic.  Lab work reviewed.  Patient is usually have dialysis Monday Wednesday Friday, AV fistula to the left forearm.  Patient had dialysis last Saturday with anticipation of surgery.  Patient is due dialysis tomorrow.     Past Medical History:   Diagnosis Date    Allergy     Anemia     Anxiety     CHF (congestive heart failure)     Depression     Dialysis patient     High blood pressure with chronic kidney disease, stage 5 chronic kidney disease or end stage renal disease     Hypertension     Sleep apnea        Past Surgical History:   Procedure Laterality Date    ABSCESS DRAINAGE Left 9/17/2024    Procedure: DRAINAGE, ABSCESS, GROIN;  Surgeon: Galileo Connors MD;  Location: [facility] OR;  Service: General;  Laterality: Left;    COLON RESECTION  9/25/2024    Procedure: COLON RESECTION;  Surgeon: Galileo Connors MD;  Location: [facility] OR;  Service: General;;    COLONOSCOPY N/A 2/17/2025    Procedure: COLONOSCOPY;  Surgeon:  Galileo Gonzáles MD;  Location: [facility] ENDO;  Service: Endoscopy;  Laterality: N/A;  gonzáles    FISTULOGRAM Bilateral 4/30/2024    Procedure: Fistulogram;  Surgeon: Khoobehi, Ali, MD;  Location: [facility] CATH/EP LAB;  Service: Vascular;  Laterality: Bilateral;    FISTULOGRAM Left 9/24/2024    Procedure: Fistulogram;  Surgeon: Lorraine Salvador MD;  Location: [facility] CATH/EP LAB;  Service: General;  Laterality: Left;    HERNIA REPAIR Right     With mesh    INSERTION, CATHETER, TUNNELED N/A 6/22/2023    Procedure: Insertion,catheter,tunneled;  Surgeon: Everett Caicedo MD;  Location: [facility] OR;  Service: General;  Laterality: N/A;    LAPAROTOMY, EXPLORATORY N/A 9/25/2024    Procedure: LAPAROTOMY, EXPLORATORY;  Surgeon: Galileo Gonzáles MD;  Location: [facility] OR;  Service: General;  Laterality: N/A;    MOBILIZATION OF SPLENIC FLEXURE  9/25/2024    Procedure: MOBILIZATION, SPLENIC FLEXURE;  Surgeon: Galileo Gonzáles MD;  Location: [facility] OR;  Service: General;;    PERCUTANEOUS TRANSLUMINAL ANGIOPLASTY OF ARTERIOVENOUS FISTULA Left 4/30/2024    Procedure: PTA, AV FISTULA;  Surgeon: Khoobehi, Ali, MD;  Location: [facility] CATH/EP LAB;  Service: Vascular;  Laterality: Left;    PERCUTANEOUS TRANSLUMINAL ANGIOPLASTY OF ARTERIOVENOUS FISTULA Left 9/24/2024    Procedure: PTA, AV FISTULA;  Surgeon: Lorraine Salvador MD;  Location: [facility] CATH/EP LAB;  Service: General;  Laterality: Left;    PHLEBOGRAPHY N/A 7/19/2024    Procedure: Venogram;  Surgeon: Khoobehi, Ali, MD;  Location: [facility] CATH/EP LAB;  Service: Vascular;  Laterality: N/A;    REMOVAL, TUNNELED CATH Right 7/19/2024    Procedure: REMOVAL, TUNNELED CATH;  Surgeon: Khoobehi, Ali, MD;  Location: [facility] CATH/EP LAB;  Service: Vascular;  Laterality: Right;    ROBOT-ASSISTED LAPAROSCOPIC RESECTION OF SIGMOID COLON USING DA DELANO XI N/A 9/17/2024    Procedure: XI ROBOTIC SIGMOID RESECTION, WITH ANASTAMOSIS;  Surgeon: Galileo Gonzáles MD;  Location: [facility];  Service: General;  Laterality: N/A;   possible open have instruments available       Review of patient's allergies indicates:   Allergen Reactions    Mushroom Swelling     Tongue swells    Tongue swells       Tongue swells       No current facility-administered medications on file prior to encounter.     Current Outpatient Medications on File Prior to Encounter   Medication Sig    cholecalciferol, vitamin D3, 125 mcg (5,000 unit) Tab Take 5,000 Units by mouth every morning.    cloNIDine (CATAPRES) 0.3 MG tablet Take 1 tablet (0.3 mg total) by mouth 3 (three) times daily as needed (SBP > 150).    hydrALAZINE (APRESOLINE) 100 MG tablet Take 1 tablet (100 mg total) by mouth every 8 (eight) hours. Hold for SBP < 120 and before dialysis    isosorbide mononitrate (IMDUR) 120 MG 24 hr tablet Take 120 mg by mouth every morning.    metoprolol succinate (TOPROL-XL) 50 MG 24 hr tablet Take 150 mg by mouth once daily.    olmesartan (BENICAR) 40 MG tablet Take 1 tablet (40 mg total) by mouth once daily.    apixaban (ELIQUIS) 2.5 mg Tab Take 1 tablet (2.5 mg total) by mouth 2 (two) times daily.    calcitRIOL (ROCALTROL) 0.25 MCG Cap Take 1 capsule (0.25 mcg total) by mouth once daily. (Patient not taking: Reported on 1/14/2025)    calcium carbonate (TUMS) 200 mg calcium (500 mg) chewable tablet Take 2 tablets (1,000 mg total) by mouth 3 (three) times daily. (Patient not taking: Reported on 1/14/2025)    epoetin mily-epbx (RETACRIT) 20,000 unit/mL injection Inject 0.67 mLs (13,400 Units total) into the skin every Mon, Wed, Fri.    heparin sodium,porcine (HEPARIN, PORCINE,) 1,000 unit/mL injection Inject 4 mLs (4,000 Units total) into the vein as needed (line care after dialysis).    heparin sodium,porcine (HEPARIN, PORCINE,) 5,000 unit/mL injection Inject 1 mL (5,000 Units total) into the skin every 8 (eight) hours.    ketoconazole (NIZORAL) 2 % shampoo Apply topically every other day. (Patient not taking: Reported on 1/14/2025)    LIDOcaine-prilocaine (EMLA) cream  Apply topically as needed (pre dialysis).    miconazole (MICOTIN) 2 % cream Apply topically 2 (two) times daily. (Patient not taking: Reported on 1/14/2025)    sevelamer carbonate (RENVELA) 2.4 gram PwPk Take by mouth.    [DISCONTINUED] ALPRAZolam (XANAX) 0.25 MG tablet Take 1 tablet (0.25 mg total) by mouth nightly as needed for Insomnia. (Patient not taking: Reported on 10/22/2024)    [DISCONTINUED] dicyclomine (BENTYL) 10 MG capsule Take 1 capsule (10 mg total) by mouth 3 (three) times daily as needed (crampy abdominal pain). (Patient not taking: Reported on 1/14/2025)    [DISCONTINUED] hyoscyamine (LEVSIN/SL) 0.125 mg Subl Place 1 tablet (0.125 mg total) under the tongue every 4 (four) hours as needed (abdominal cramping). (Patient not taking: Reported on 2/13/2025)    [DISCONTINUED] ondansetron (ZOFRAN-ODT) 4 MG TbDL Take 1 tablet (4 mg total) by mouth every 6 (six) hours as needed (nausea). (Patient not taking: Reported on 2/13/2025)     Family History       Problem Relation (Age of Onset)    Hypertension Mother          Tobacco Use    Smoking status: Never     Passive exposure: Never    Smokeless tobacco: Never   Substance and Sexual Activity    Alcohol use: Never    Drug use: Never    Sexual activity: Not Currently     Review of Systems   Unable to perform ROS: Mental status change     Objective:     Vital Signs (Most Recent):  Temp: 98.2 °F (36.8 °C) (02/24/25 1007)  Pulse: 74 (02/24/25 1007)  Resp: 18 (02/24/25 1007)  BP: 131/84 (02/24/25 1007)  SpO2: 97 % (02/24/25 1007) Vital Signs (24h Range):  Temp:  [98.2 °F (36.8 °C)] 98.2 °F (36.8 °C)  Pulse:  [74] 74  Resp:  [18] 18  SpO2:  [97 %] 97 %  BP: (131)/(84) 131/84     Weight: 114.3 kg (252 lb)  Body mass index is 32.35 kg/m².     Physical Exam  Vitals and nursing note reviewed.   Constitutional:       General: He is not in acute distress.     Appearance: He is not diaphoretic.   HENT:      Head: Normocephalic.   Eyes:      Conjunctiva/sclera:  "Conjunctivae normal.   Neck:      Vascular: No JVD.   Cardiovascular:      Rate and Rhythm: Normal rate and regular rhythm.      Heart sounds: Normal heart sounds. No murmur heard.     No friction rub.   Pulmonary:      Effort: Pulmonary effort is normal. No respiratory distress.      Breath sounds: Normal breath sounds. No wheezing.   Abdominal:      General: Bowel sounds are normal. There is no distension.      Palpations: Abdomen is soft.      Tenderness: There is no abdominal tenderness.      Comments: Surgical site is dry and clean   Lymphadenopathy:      Cervical: No cervical adenopathy.   Neurological:      Deep Tendon Reflexes: Reflexes are normal and symmetric.   Psychiatric:         Behavior: Behavior normal.          Significant Labs: All pertinent labs within the past 24 hours have been reviewed.  CBC: No results for input(s): "WBC", "HGB", "HCT", "PLT" in the last 48 hours.  CMP:   Recent Labs   Lab 02/24/25  0952   K 3.9       Significant Imaging: I have reviewed all pertinent imaging results/findings within the past 24 hours.  I have reviewed and interpreted all pertinent imaging results/findings within the past 24 hours.      Scheduled Meds:   LIDOcaine (PF) 10 mg/ml (1%)  1 mL Intradermal Once     Continuous Infusions:   0.9% NaCl   Intravenous Continuous 70 mL/hr at 02/24/25 1025 New Bag at 02/24/25 1025    electrolyte-S (pH 7.4)   Intravenous Continuous        electrolyte-S (pH 7.4)   Intravenous Continuous         PRN Meds:.  Current Facility-Administered Medications:     BUPivacaine liposome (PF), , , Code/trauma/sedation Med    BUPivacaine-EPINEPHrine (PF) 0.5%-1:200,000, , , Code/trauma/sedation Med    fentaNYL, 25 mcg, Intravenous, Q5 Min PRN    ondansetron, 4 mg, Intravenous, Once PRN    oxyCODONE, 5 mg, Oral, Once PRN    CT Abdomen Pelvis  Without Contrast  Result Date: 1/28/2025  EXAMINATION: CT ABDOMEN PELVIS WITHOUT CONTRAST CLINICAL HISTORY: Eval prior to reversal of colostomy/ Pt ESRD " Stage 4; Encounter for follow-up examination after completed treatment for conditions other than malignant neoplasm TECHNIQUE: Low dose axial images, sagittal and coronal reformations were obtained from the lung bases to the pubic symphysis. COMPARISON: CT abdomen pelvis of October 25, 2024. FINDINGS: The liver is of normal size contour and CT density for a noncontrast study.  A focal liver mass is not seen.  The gallbladder is of normal size without CT evidence of stone.  The pancreas is of normal contour and CT density without edema or mass.  The spleen is small and of normal CT density. The adrenal glands are not enlarged.  The kidneys are small.  The right kidney measures 6.7 cm in length, the left kidney measures 6.1 cm in length.  A stone is not seen.  There is a  water density 2.6 cm cyst of the midpole of the left kidney.  A stone or hydronephrosis is not seen.  The abdominal aorta and inferior vena cava are of normal caliber. The stomach is of normal configuration.  Small bowel dilatation or air-fluid levels are not seen.  The patient has had resection of the left colon with a Aidan pouch and the transverse colon leading to a colostomy in the of the left pelvic wall.  Bowel obstruction is not demonstrated.  Few diverticuli are seen in the cecum.  A colon mass is not identified.  A normal appendix is seen.  Retroperitoneal or pelvic adenopathy is not identified.  Postoperative changes are noted in the left inguinal region with a possible small postop organizing hematoma extending  adjacent to the spermatic cord.  The bladder is of normal contour without mass or asymmetry.  The prostate is not enlarged.     Prior left colon resection with colostomy to the left pelvic wall and a Aidan's pouch.  A recurrent mass is not identified.  Postoperative changes in the left groin with a probable small organizing hematoma.  Mild diverticulosis coli in the cecum Bilateral small kidneys.  2.6 cm cyst of the left  kidney Electronically signed by: Everett Castellano MD Date:    01/28/2025 Time:    11:48  - pulls last radiology orders    Assessment/Plan:     * Colostomy status    Status post reversal today.   Patient had  REVISION OR CLOSURE, COLOSTOMY (N/A)  Exploratory laparotomy   Lysis of adhesions  REctosigmoid resection.   Takedwon of colostomy with resection  Creation of colorectal anastomosis.     Continue postoperative medical management as per General surgery.   Close monitor.   Judicious IV hydration since patient has ESRD.     Anticoagulated    On Eliquis for unclear reason.   Hold Eliquis now    Essential hypertension  Patient's blood pressure range in the last 24 hours was: BP  Min: 131/84  Max: 131/84.The patient's inpatient anti-hypertensive regimen is listed below:  Current Antihypertensives       Plan  - BP is controlled, no changes needed to their regimen  -     ESRD (end stage renal disease)  Creatine stable for now. BMP reviewed- noted CrCl cannot be calculated (Patient's most recent lab result is older than the maximum 7 days allowed.). according to latest data. Based on current GFR, CKD stage is end stage.  Monitor UOP and serial BMP and adjust therapy as needed. Renally dose meds. Avoid nephrotoxic medications and procedures.    On hemodialysis, we will consult Nephrology for dialysis support      VTE Risk Mitigation (From admission, onward)           Ordered     IP VTE HIGH RISK PATIENT  Once         02/24/25 0932     Place sequential compression device  Until discontinued         02/24/25 0932     Place SAVANA hose  Until discontinued         02/24/25 0932                        Thank you for your consult. I will follow-up with patient. Please contact us if you have any additional questions.    Forrest Mireles MD  Department of Hospital Medicine   [facility]

## 2025-02-24 NOTE — ANESTHESIA PREPROCEDURE EVALUATION
02/24/2025  João Ham is a 44 y.o., male.      Pre-op Assessment    I have reviewed the NPO Status.   I have reviewed the Medications.     Review of Systems  Anesthesia Hx:  No problems with previous Anesthesia                Cardiovascular:  Exercise tolerance: good   Hypertension       CHF                Congestive Heart Failure (CHF)                Hypertension         Pulmonary:        Sleep Apnea, CPAP     Obstructive Sleep Apnea (CLOVIS).      Education provided regarding risk of obstructive sleep apnea            Renal/:  Chronic Renal Disease, ESRD   Last dialyzed 2/22     Kidney Function/Disease             Hepatic/GI:        Colostomy in place, here for reversal             Neurological:    Neuromuscular Disease,                                 Neuromuscular Disease   Endocrine:        Obesity / BMI > 30  Psych:  Psychiatric History                  Physical Exam  General: Well nourished    Airway:  Mallampati: II   Mouth Opening: Normal  TM Distance: Normal  Tongue: Normal  Neck ROM: Normal ROM    Dental:  Intact    Chest/Lungs:  Clear to auscultation, Normal Respiratory Rate        Anesthesia Plan  Type of Anesthesia, risks & benefits discussed:    Anesthesia Type: Gen ETT  Intra-op Monitoring Plan: Standard ASA Monitors  Post Op Pain Control Plan: multimodal analgesia and IV/PO Opioids PRN  Induction:  IV  Airway Plan: Direct, Post-Induction  Informed Consent: Informed consent signed with the Patient and all parties understand the risks and agree with anesthesia plan.  All questions answered. Patient consented to blood products? Yes  ASA Score: 3    Ready For Surgery From Anesthesia Perspective.     .

## 2025-02-24 NOTE — CARE UPDATE
02/24/25 0948   Patient Assessment/Suction   Level of Consciousness (AVPU) alert   Incentive Spirometer   $ Incentive Spirometer Charges done with encouragement   Incentive Spirometer Predicted Level (mL) 2680   Administration (IS) proper technique demonstrated   Number of Repetitions (IS) 5   Level Incentive Spirometer (mL) 3500   Patient Tolerance (IS) good;no adverse signs/symptoms present

## 2025-02-24 NOTE — ASSESSMENT & PLAN NOTE
Status post reversal today.   Patient had  REVISION OR CLOSURE, COLOSTOMY (N/A)  Exploratory laparotomy   Lysis of adhesions  REctosigmoid resection.   Takedwon of colostomy with resection  Creation of colorectal anastomosis.     Continue postoperative medical management as per General surgery.   Close monitor.   Judicious IV hydration since patient has ESRD.

## 2025-02-24 NOTE — Clinical Note
The left brachial and right chest was prepped. The site was prepped with ChloraPrep. The site was clipped. The patient was draped.

## 2025-02-24 NOTE — HPI
44-year-old male with a history of ESRD on hemodialysis awaiting for renal transplant, hypertension, chronic colostomy status presenting here for colostomy reversal.  On 9/2024, patient was hospitalized for inguinal hernia gangrene.  Found to have colonic perforation due to mesh erosion with an abscess.  Patient had sigmoidectomy with colostomy since then.  Now patient is presenting for a colostomy reversal, prior to this surgery patient had colonoscopy through the stoma by Dr. Connors which is unremarkable.  Patient is seen and examined in the PACU, still lethargic.  Lab work reviewed.  Patient is usually have dialysis Monday Wednesday Friday, AV fistula to the left forearm.  Patient had dialysis last Saturday with anticipation of surgery.  Patient is due dialysis tomorrow.

## 2025-02-24 NOTE — TRANSFER OF CARE
"Anesthesia Transfer of Care Note    Patient: João Ham    Procedure(s) Performed: Procedure(s) (LRB):  REVISION OR CLOSURE, COLOSTOMY (N/A)    Patient location: PACU    Anesthesia Type: general    Transport from OR: Transported from OR on 6-10 L/min O2 by face mask with adequate spontaneous ventilation    Post pain: adequate analgesia    Post assessment: no apparent anesthetic complications    Post vital signs: stable    Level of consciousness: sedated    Nausea/Vomiting: no nausea/vomiting    Complications: none    Transfer of care protocol was followed    Last vitals: Visit Vitals  BP (!) 117/59   Pulse 74   Temp 36.7 °C (98.1 °F) (Skin)   Resp 18   Ht 6' 2" (1.88 m)   Wt 114.3 kg (252 lb)   SpO2 97%   BMI 32.35 kg/m²     "

## 2025-02-24 NOTE — Clinical Note
The DP pulses were 2+ bilaterally. The PT pulses were 2+ bilaterally. The left brachial pulse was 1+.

## 2025-02-24 NOTE — BRIEF OP NOTE
[facility] Services  Brief Operative Note    SUMMARY     Surgery Date: 2/24/2025     Surgeons and Role:     * Galileo Connors MD - Primary    Assisting Surgeon: None    Pre-op Diagnosis:  Colostomy status [Z93.3]    Post-op Diagnosis:  Post-Op Diagnosis Codes:     * Colostomy status [Z93.3]    Procedure(s) (LRB):  REVISION OR CLOSURE, COLOSTOMY (N/A)  Exploratory laparotomy   Lysis of adhesions  REctosigmoid resection.   Takedwon of colostomy with resection  Creation of colorectal anastomosis.     Anesthesia: General    Implants:  Implant Name Type Inv. Item Serial No.  Lot No. LRB No. Used Action   MEMBRANE SEPRAFILM 5 X 6 - [id number]  MEMBRANE SEPRAFILM 5 X 6  [employer] [id number] N/A 1 Implanted       Operative Findings: Colostomy in place.       Estimated Blood Loss: 100 mL    Estimated Blood Loss has been documented.         Specimens:   Specimen (24h ago, onward)       Start     Ordered    02/24/25 1433  Specimen to Pathology - Surgery  Once        Comments: Pre-op Diagnosis: Colostomy status [Z93.3]Procedure(s):REVISION OR CLOSURE, COLOSTOMY Number of specimens: 4Name of specimens: 1. RECTUM WITH PROXIMAL AND DISTAL DONUTS  2. APPENDIX  3. COLON  4. OMENTUM MASS     Question:  Release to patient  Answer:  Immediate    02/24/25 8887                    [id number]

## 2025-02-24 NOTE — SUBJECTIVE & OBJECTIVE
Past Medical History:   Diagnosis Date    Allergy     Anemia     Anxiety     CHF (congestive heart failure)     Depression     Dialysis patient     High blood pressure with chronic kidney disease, stage 5 chronic kidney disease or end stage renal disease     Hypertension     Sleep apnea        Past Surgical History:   Procedure Laterality Date    ABSCESS DRAINAGE Left 9/17/2024    Procedure: DRAINAGE, ABSCESS, GROIN;  Surgeon: Galileo Gonzáles MD;  Location: [facility] OR;  Service: General;  Laterality: Left;    COLON RESECTION  9/25/2024    Procedure: COLON RESECTION;  Surgeon: Galileo Gonzáles MD;  Location: [facility] OR;  Service: General;;    COLONOSCOPY N/A 2/17/2025    Procedure: COLONOSCOPY;  Surgeon: Galileo Gonzáles MD;  Location: [facility] ENDO;  Service: Endoscopy;  Laterality: N/A;  gonzáles    FISTULOGRAM Bilateral 4/30/2024    Procedure: Fistulogram;  Surgeon: Khoobehi, Ali, MD;  Location: [facility] CATH/EP LAB;  Service: Vascular;  Laterality: Bilateral;    FISTULOGRAM Left 9/24/2024    Procedure: Fistulogram;  Surgeon: Lorraine Salvador MD;  Location: [facility] CATH/EP LAB;  Service: General;  Laterality: Left;    HERNIA REPAIR Right     With mesh    INSERTION, CATHETER, TUNNELED N/A 6/22/2023    Procedure: Insertion,catheter,tunneled;  Surgeon: Everett Caicedo MD;  Location: [facility] OR;  Service: General;  Laterality: N/A;    LAPAROTOMY, EXPLORATORY N/A 9/25/2024    Procedure: LAPAROTOMY, EXPLORATORY;  Surgeon: Galileo Gonzáles MD;  Location: [facility] OR;  Service: General;  Laterality: N/A;    MOBILIZATION OF SPLENIC FLEXURE  9/25/2024    Procedure: MOBILIZATION, SPLENIC FLEXURE;  Surgeon: Galileo Gonzáles MD;  Location: [facility] OR;  Service: General;;    PERCUTANEOUS TRANSLUMINAL ANGIOPLASTY OF ARTERIOVENOUS FISTULA Left 4/30/2024    Procedure: PTA, AV FISTULA;  Surgeon: Khoobehi, Ali, MD;  Location: [facility] CATH/EP LAB;  Service: Vascular;  Laterality: Left;    PERCUTANEOUS TRANSLUMINAL ANGIOPLASTY OF ARTERIOVENOUS FISTULA Left  9/24/2024    Procedure: PTA, AV FISTULA;  Surgeon: Lorraine Salvador MD;  Location: [facility] CATH/EP LAB;  Service: General;  Laterality: Left;    PHLEBOGRAPHY N/A 7/19/2024    Procedure: Venogram;  Surgeon: Khoobehi, Ali, MD;  Location: [facility] CATH/EP LAB;  Service: Vascular;  Laterality: N/A;    REMOVAL, TUNNELED CATH Right 7/19/2024    Procedure: REMOVAL, TUNNELED CATH;  Surgeon: Khoobehi, Ali, MD;  Location: [facility] CATH/EP LAB;  Service: Vascular;  Laterality: Right;    ROBOT-ASSISTED LAPAROSCOPIC RESECTION OF SIGMOID COLON USING DA DELANO XI N/A 9/17/2024    Procedure: XI ROBOTIC SIGMOID RESECTION, WITH ANASTAMOSIS;  Surgeon: Galileo Connors MD;  Location: [facility] OR;  Service: General;  Laterality: N/A;  possible open have instruments available       Review of patient's allergies indicates:   Allergen Reactions    Mushroom Swelling     Tongue swells    Tongue swells       Tongue swells       No current facility-administered medications on file prior to encounter.     Current Outpatient Medications on File Prior to Encounter   Medication Sig    cholecalciferol, vitamin D3, 125 mcg (5,000 unit) Tab Take 5,000 Units by mouth every morning.    cloNIDine (CATAPRES) 0.3 MG tablet Take 1 tablet (0.3 mg total) by mouth 3 (three) times daily as needed (SBP > 150).    hydrALAZINE (APRESOLINE) 100 MG tablet Take 1 tablet (100 mg total) by mouth every 8 (eight) hours. Hold for SBP < 120 and before dialysis    isosorbide mononitrate (IMDUR) 120 MG 24 hr tablet Take 120 mg by mouth every morning.    metoprolol succinate (TOPROL-XL) 50 MG 24 hr tablet Take 150 mg by mouth once daily.    olmesartan (BENICAR) 40 MG tablet Take 1 tablet (40 mg total) by mouth once daily.    apixaban (ELIQUIS) 2.5 mg Tab Take 1 tablet (2.5 mg total) by mouth 2 (two) times daily.    calcitRIOL (ROCALTROL) 0.25 MCG Cap Take 1 capsule (0.25 mcg total) by mouth once daily. (Patient not taking: Reported on 1/14/2025)    calcium carbonate (TUMS) 200 mg calcium  (500 mg) chewable tablet Take 2 tablets (1,000 mg total) by mouth 3 (three) times daily. (Patient not taking: Reported on 1/14/2025)    epoetin mily-epbx (RETACRIT) 20,000 unit/mL injection Inject 0.67 mLs (13,400 Units total) into the skin every Mon, Wed, Fri.    heparin sodium,porcine (HEPARIN, PORCINE,) 1,000 unit/mL injection Inject 4 mLs (4,000 Units total) into the vein as needed (line care after dialysis).    heparin sodium,porcine (HEPARIN, PORCINE,) 5,000 unit/mL injection Inject 1 mL (5,000 Units total) into the skin every 8 (eight) hours.    ketoconazole (NIZORAL) 2 % shampoo Apply topically every other day. (Patient not taking: Reported on 1/14/2025)    LIDOcaine-prilocaine (EMLA) cream Apply topically as needed (pre dialysis).    miconazole (MICOTIN) 2 % cream Apply topically 2 (two) times daily. (Patient not taking: Reported on 1/14/2025)    sevelamer carbonate (RENVELA) 2.4 gram PwPk Take by mouth.    [DISCONTINUED] ALPRAZolam (XANAX) 0.25 MG tablet Take 1 tablet (0.25 mg total) by mouth nightly as needed for Insomnia. (Patient not taking: Reported on 10/22/2024)    [DISCONTINUED] dicyclomine (BENTYL) 10 MG capsule Take 1 capsule (10 mg total) by mouth 3 (three) times daily as needed (crampy abdominal pain). (Patient not taking: Reported on 1/14/2025)    [DISCONTINUED] hyoscyamine (LEVSIN/SL) 0.125 mg Subl Place 1 tablet (0.125 mg total) under the tongue every 4 (four) hours as needed (abdominal cramping). (Patient not taking: Reported on 2/13/2025)    [DISCONTINUED] ondansetron (ZOFRAN-ODT) 4 MG TbDL Take 1 tablet (4 mg total) by mouth every 6 (six) hours as needed (nausea). (Patient not taking: Reported on 2/13/2025)     Family History       Problem Relation (Age of Onset)    Hypertension Mother          Tobacco Use    Smoking status: Never     Passive exposure: Never    Smokeless tobacco: Never   Substance and Sexual Activity    Alcohol use: Never    Drug use: Never    Sexual activity: Not  "Currently     Review of Systems   Unable to perform ROS: Mental status change     Objective:     Vital Signs (Most Recent):  Temp: 98.2 °F (36.8 °C) (02/24/25 1007)  Pulse: 74 (02/24/25 1007)  Resp: 18 (02/24/25 1007)  BP: 131/84 (02/24/25 1007)  SpO2: 97 % (02/24/25 1007) Vital Signs (24h Range):  Temp:  [98.2 °F (36.8 °C)] 98.2 °F (36.8 °C)  Pulse:  [74] 74  Resp:  [18] 18  SpO2:  [97 %] 97 %  BP: (131)/(84) 131/84     Weight: 114.3 kg (252 lb)  Body mass index is 32.35 kg/m².     Physical Exam  Vitals and nursing note reviewed.   Constitutional:       General: He is not in acute distress.     Appearance: He is not diaphoretic.   HENT:      Head: Normocephalic.   Eyes:      Conjunctiva/sclera: Conjunctivae normal.   Neck:      Vascular: No JVD.   Cardiovascular:      Rate and Rhythm: Normal rate and regular rhythm.      Heart sounds: Normal heart sounds. No murmur heard.     No friction rub.   Pulmonary:      Effort: Pulmonary effort is normal. No respiratory distress.      Breath sounds: Normal breath sounds. No wheezing.   Abdominal:      General: Bowel sounds are normal. There is no distension.      Palpations: Abdomen is soft.      Tenderness: There is no abdominal tenderness.      Comments: Surgical site is dry and clean   Lymphadenopathy:      Cervical: No cervical adenopathy.   Neurological:      Deep Tendon Reflexes: Reflexes are normal and symmetric.   Psychiatric:         Behavior: Behavior normal.          Significant Labs: All pertinent labs within the past 24 hours have been reviewed.  CBC: No results for input(s): "WBC", "HGB", "HCT", "PLT" in the last 48 hours.  CMP:   Recent Labs   Lab 02/24/25  0952   K 3.9       Significant Imaging: I have reviewed all pertinent imaging results/findings within the past 24 hours.  I have reviewed and interpreted all pertinent imaging results/findings within the past 24 hours.      Scheduled Meds:   LIDOcaine (PF) 10 mg/ml (1%)  1 mL Intradermal Once     Continuous " Infusions:   0.9% NaCl   Intravenous Continuous 70 mL/hr at 02/24/25 1025 New Bag at 02/24/25 1025    electrolyte-S (pH 7.4)   Intravenous Continuous        electrolyte-S (pH 7.4)   Intravenous Continuous         PRN Meds:.  Current Facility-Administered Medications:     BUPivacaine liposome (PF), , , Code/trauma/sedation Med    BUPivacaine-EPINEPHrine (PF) 0.5%-1:200,000, , , Code/trauma/sedation Med    fentaNYL, 25 mcg, Intravenous, Q5 Min PRN    ondansetron, 4 mg, Intravenous, Once PRN    oxyCODONE, 5 mg, Oral, Once PRN    CT Abdomen Pelvis  Without Contrast  Result Date: 1/28/2025  EXAMINATION: CT ABDOMEN PELVIS WITHOUT CONTRAST CLINICAL HISTORY: Eval prior to reversal of colostomy/ Pt ESRD Stage 4; Encounter for follow-up examination after completed treatment for conditions other than malignant neoplasm TECHNIQUE: Low dose axial images, sagittal and coronal reformations were obtained from the lung bases to the pubic symphysis. COMPARISON: CT abdomen pelvis of October 25, 2024. FINDINGS: The liver is of normal size contour and CT density for a noncontrast study.  A focal liver mass is not seen.  The gallbladder is of normal size without CT evidence of stone.  The pancreas is of normal contour and CT density without edema or mass.  The spleen is small and of normal CT density. The adrenal glands are not enlarged.  The kidneys are small.  The right kidney measures 6.7 cm in length, the left kidney measures 6.1 cm in length.  A stone is not seen.  There is a  water density 2.6 cm cyst of the midpole of the left kidney.  A stone or hydronephrosis is not seen.  The abdominal aorta and inferior vena cava are of normal caliber. The stomach is of normal configuration.  Small bowel dilatation or air-fluid levels are not seen.  The patient has had resection of the left colon with a Aidan pouch and the transverse colon leading to a colostomy in the of the left pelvic wall.  Bowel obstruction is not demonstrated.  Few  diverticuli are seen in the cecum.  A colon mass is not identified.  A normal appendix is seen.  Retroperitoneal or pelvic adenopathy is not identified.  Postoperative changes are noted in the left inguinal region with a possible small postop organizing hematoma extending  adjacent to the spermatic cord.  The bladder is of normal contour without mass or asymmetry.  The prostate is not enlarged.     Prior left colon resection with colostomy to the left pelvic wall and a Aidan's pouch.  A recurrent mass is not identified.  Postoperative changes in the left groin with a probable small organizing hematoma.  Mild diverticulosis coli in the cecum Bilateral small kidneys.  2.6 cm cyst of the left kidney Electronically signed by: Everett Castellano MD Date:    01/28/2025 Time:    11:48  - pulls last radiology orders

## 2025-02-24 NOTE — ASSESSMENT & PLAN NOTE
Creatine stable for now. BMP reviewed- noted CrCl cannot be calculated (Patient's most recent lab result is older than the maximum 7 days allowed.). according to latest data. Based on current GFR, CKD stage is end stage.  Monitor UOP and serial BMP and adjust therapy as needed. Renally dose meds. Avoid nephrotoxic medications and procedures.    On hemodialysis, we will consult Nephrology for dialysis support

## 2025-02-24 NOTE — ANESTHESIA PROCEDURE NOTES
Intubation    Date/Time: 2/24/2025 11:38 AM    Performed by: Luiz Ramirez CRNA  Authorized by: Mert Ross MD    Intubation:     Induction:  Intravenous    Intubated:  Postinduction    Mask Ventilation:  Easy with oral airway    Attempts:  1    Attempted By:  CRNA    Method of Intubation:  Video laryngoscopy    Blade:  Foster 3    Laryngeal View Grade: Grade I - full view of cords      Difficult Airway Encountered?: No      Complications:  None    Airway Device:  Oral endotracheal tube    Airway Device Size:  7.5    Style/Cuff Inflation:  Cuffed (inflated to minimal occlusive pressure)    Tube secured:  23    Secured at:  The lips    Placement Verified By:  Capnometry    Complicating Factors:  None    Findings Post-Intubation:  BS equal bilateral and atraumatic/condition of teeth unchanged

## 2025-02-24 NOTE — ASSESSMENT & PLAN NOTE
Patient's blood pressure range in the last 24 hours was: BP  Min: 131/84  Max: 131/84.The patient's inpatient anti-hypertensive regimen is listed below:  Current Antihypertensives       Plan  - BP is controlled, no changes needed to their regimen  -

## 2025-02-24 NOTE — PLAN OF CARE
Pre-op complete. Father at bedside. Text notifications initiated. Pt denies need for safe. Belongings brought to pacu.Notified Dr. Connors and Dr. Ross that pt wants to avoid having blood transfusion at all cost and if necessary it must be leuko-reduced.

## 2025-02-25 LAB
ALBUMIN SERPL BCP-MCNC: 3.6 G/DL (ref 3.5–5.2)
ALP SERPL-CCNC: 53 U/L (ref 40–150)
ALT SERPL W/O P-5'-P-CCNC: 17 U/L (ref 10–44)
ANION GAP SERPL CALC-SCNC: 22 MMOL/L (ref 8–16)
ANION GAP SERPL CALC-SCNC: 22 MMOL/L (ref 8–16)
AST SERPL-CCNC: 14 U/L (ref 10–40)
BASOPHILS # BLD AUTO: 0 K/UL (ref 0–0.2)
BASOPHILS # BLD AUTO: 0 K/UL (ref 0–0.2)
BASOPHILS NFR BLD: 0 % (ref 0–1.9)
BASOPHILS NFR BLD: 0 % (ref 0–1.9)
BILIRUB SERPL-MCNC: 0.3 MG/DL (ref 0.1–1)
BUN SERPL-MCNC: 76 MG/DL (ref 6–20)
BUN SERPL-MCNC: 76 MG/DL (ref 6–20)
CALCIUM SERPL-MCNC: 7.2 MG/DL (ref 8.7–10.5)
CALCIUM SERPL-MCNC: 7.2 MG/DL (ref 8.7–10.5)
CHLORIDE SERPL-SCNC: 94 MMOL/L (ref 95–110)
CHLORIDE SERPL-SCNC: 94 MMOL/L (ref 95–110)
CO2 SERPL-SCNC: 21 MMOL/L (ref 23–29)
CO2 SERPL-SCNC: 21 MMOL/L (ref 23–29)
CREAT SERPL-MCNC: 15.5 MG/DL (ref 0.5–1.4)
CREAT SERPL-MCNC: 15.5 MG/DL (ref 0.5–1.4)
DIFFERENTIAL METHOD BLD: ABNORMAL
DIFFERENTIAL METHOD BLD: ABNORMAL
EOSINOPHIL # BLD AUTO: 0 K/UL (ref 0–0.5)
EOSINOPHIL # BLD AUTO: 0 K/UL (ref 0–0.5)
EOSINOPHIL NFR BLD: 0.2 % (ref 0–8)
EOSINOPHIL NFR BLD: 0.2 % (ref 0–8)
ERYTHROCYTE [DISTWIDTH] IN BLOOD BY AUTOMATED COUNT: 15.3 % (ref 11.5–14.5)
ERYTHROCYTE [DISTWIDTH] IN BLOOD BY AUTOMATED COUNT: 15.3 % (ref 11.5–14.5)
EST. GFR  (NO RACE VARIABLE): 4 ML/MIN/1.73 M^2
EST. GFR  (NO RACE VARIABLE): 4 ML/MIN/1.73 M^2
GLUCOSE SERPL-MCNC: 95 MG/DL (ref 70–110)
GLUCOSE SERPL-MCNC: 95 MG/DL (ref 70–110)
HCT VFR BLD AUTO: 33 % (ref 40–54)
HCT VFR BLD AUTO: 33 % (ref 40–54)
HGB BLD-MCNC: 11 G/DL (ref 14–18)
HGB BLD-MCNC: 11 G/DL (ref 14–18)
IMM GRANULOCYTES # BLD AUTO: 0.03 K/UL (ref 0–0.04)
IMM GRANULOCYTES # BLD AUTO: 0.03 K/UL (ref 0–0.04)
IMM GRANULOCYTES NFR BLD AUTO: 0.3 % (ref 0–0.5)
IMM GRANULOCYTES NFR BLD AUTO: 0.3 % (ref 0–0.5)
LYMPHOCYTES # BLD AUTO: 1.1 K/UL (ref 1–4.8)
LYMPHOCYTES # BLD AUTO: 1.1 K/UL (ref 1–4.8)
LYMPHOCYTES NFR BLD: 10 % (ref 18–48)
LYMPHOCYTES NFR BLD: 10 % (ref 18–48)
MAGNESIUM SERPL-MCNC: 1.9 MG/DL (ref 1.6–2.6)
MCH RBC QN AUTO: 30.7 PG (ref 27–31)
MCH RBC QN AUTO: 30.7 PG (ref 27–31)
MCHC RBC AUTO-ENTMCNC: 33.3 G/DL (ref 32–36)
MCHC RBC AUTO-ENTMCNC: 33.3 G/DL (ref 32–36)
MCV RBC AUTO: 92 FL (ref 82–98)
MCV RBC AUTO: 92 FL (ref 82–98)
MONOCYTES # BLD AUTO: 0.8 K/UL (ref 0.3–1)
MONOCYTES # BLD AUTO: 0.8 K/UL (ref 0.3–1)
MONOCYTES NFR BLD: 7.3 % (ref 4–15)
MONOCYTES NFR BLD: 7.3 % (ref 4–15)
NEUTROPHILS # BLD AUTO: 8.7 K/UL (ref 1.8–7.7)
NEUTROPHILS # BLD AUTO: 8.7 K/UL (ref 1.8–7.7)
NEUTROPHILS NFR BLD: 82.2 % (ref 38–73)
NEUTROPHILS NFR BLD: 82.2 % (ref 38–73)
NRBC BLD-RTO: 0 /100 WBC
NRBC BLD-RTO: 0 /100 WBC
PHOSPHATE SERPL-MCNC: 12.7 MG/DL (ref 2.7–4.5)
PLATELET # BLD AUTO: 256 K/UL (ref 150–450)
PLATELET # BLD AUTO: 256 K/UL (ref 150–450)
PMV BLD AUTO: 9.5 FL (ref 9.2–12.9)
PMV BLD AUTO: 9.5 FL (ref 9.2–12.9)
POTASSIUM SERPL-SCNC: 4.3 MMOL/L (ref 3.5–5.1)
POTASSIUM SERPL-SCNC: 4.3 MMOL/L (ref 3.5–5.1)
PROT SERPL-MCNC: 8 G/DL (ref 6–8.4)
RBC # BLD AUTO: 3.58 M/UL (ref 4.6–6.2)
RBC # BLD AUTO: 3.58 M/UL (ref 4.6–6.2)
SODIUM SERPL-SCNC: 137 MMOL/L (ref 136–145)
SODIUM SERPL-SCNC: 137 MMOL/L (ref 136–145)
WBC # BLD AUTO: 10.62 K/UL (ref 3.9–12.7)
WBC # BLD AUTO: 10.62 K/UL (ref 3.9–12.7)

## 2025-02-25 PROCEDURE — 94761 N-INVAS EAR/PLS OXIMETRY MLT: CPT

## 2025-02-25 PROCEDURE — 25000003 PHARM REV CODE 250: Performed by: FAMILY MEDICINE

## 2025-02-25 PROCEDURE — 25000003 PHARM REV CODE 250: Performed by: HOSPITALIST

## 2025-02-25 PROCEDURE — 90935 HEMODIALYSIS ONE EVALUATION: CPT

## 2025-02-25 PROCEDURE — 63600175 PHARM REV CODE 636 W HCPCS: Performed by: SURGERY

## 2025-02-25 PROCEDURE — 80053 COMPREHEN METABOLIC PANEL: CPT | Performed by: HOSPITALIST

## 2025-02-25 PROCEDURE — 94799 UNLISTED PULMONARY SVC/PX: CPT

## 2025-02-25 PROCEDURE — 63600175 PHARM REV CODE 636 W HCPCS: Mod: TB,JZ | Performed by: NURSE PRACTITIONER

## 2025-02-25 PROCEDURE — 12000002 HC ACUTE/MED SURGE SEMI-PRIVATE ROOM

## 2025-02-25 PROCEDURE — 63600175 PHARM REV CODE 636 W HCPCS: Mod: TB,JZ | Performed by: SURGERY

## 2025-02-25 PROCEDURE — 25000003 PHARM REV CODE 250: Performed by: SURGERY

## 2025-02-25 PROCEDURE — 83735 ASSAY OF MAGNESIUM: CPT | Performed by: HOSPITALIST

## 2025-02-25 PROCEDURE — 84100 ASSAY OF PHOSPHORUS: CPT | Performed by: HOSPITALIST

## 2025-02-25 PROCEDURE — 63600175 PHARM REV CODE 636 W HCPCS: Mod: TB,JZ | Performed by: FAMILY MEDICINE

## 2025-02-25 PROCEDURE — 85025 COMPLETE CBC W/AUTO DIFF WBC: CPT | Performed by: SURGERY

## 2025-02-25 PROCEDURE — 99900035 HC TECH TIME PER 15 MIN (STAT)

## 2025-02-25 RX ORDER — HYDRALAZINE HYDROCHLORIDE 25 MG/1
100 TABLET, FILM COATED ORAL EVERY 8 HOURS
Status: DISCONTINUED | OUTPATIENT
Start: 2025-02-25 | End: 2025-02-28 | Stop reason: HOSPADM

## 2025-02-25 RX ORDER — CLONIDINE HYDROCHLORIDE 0.1 MG/1
0.3 TABLET ORAL 3 TIMES DAILY PRN
Status: DISCONTINUED | OUTPATIENT
Start: 2025-02-25 | End: 2025-02-28 | Stop reason: HOSPADM

## 2025-02-25 RX ORDER — HYDROMORPHONE HYDROCHLORIDE 2 MG/ML
2 INJECTION, SOLUTION INTRAMUSCULAR; INTRAVENOUS; SUBCUTANEOUS EVERY 4 HOURS PRN
Status: DISCONTINUED | OUTPATIENT
Start: 2025-02-25 | End: 2025-02-28

## 2025-02-25 RX ORDER — KETOROLAC TROMETHAMINE 30 MG/ML
15 INJECTION, SOLUTION INTRAMUSCULAR; INTRAVENOUS ONCE
Status: COMPLETED | OUTPATIENT
Start: 2025-02-25 | End: 2025-02-25

## 2025-02-25 RX ORDER — ACETAMINOPHEN 500 MG
5000 TABLET ORAL EVERY MORNING
Status: DISCONTINUED | OUTPATIENT
Start: 2025-02-25 | End: 2025-02-28 | Stop reason: HOSPADM

## 2025-02-25 RX ORDER — HYDROMORPHONE HYDROCHLORIDE 1 MG/ML
2 INJECTION, SOLUTION INTRAMUSCULAR; INTRAVENOUS; SUBCUTANEOUS ONCE
Status: COMPLETED | OUTPATIENT
Start: 2025-02-25 | End: 2025-02-25

## 2025-02-25 RX ORDER — LIDOCAINE AND PRILOCAINE 25; 25 MG/G; MG/G
CREAM TOPICAL ONCE
Status: COMPLETED | OUTPATIENT
Start: 2025-02-25 | End: 2025-02-25

## 2025-02-25 RX ORDER — OXYCODONE HYDROCHLORIDE 10 MG/1
10 TABLET ORAL EVERY 4 HOURS PRN
Status: DISCONTINUED | OUTPATIENT
Start: 2025-02-25 | End: 2025-02-28

## 2025-02-25 RX ORDER — ACETAMINOPHEN 500 MG
1000 TABLET ORAL 3 TIMES DAILY
Status: DISCONTINUED | OUTPATIENT
Start: 2025-02-25 | End: 2025-02-28

## 2025-02-25 RX ORDER — ENOXAPARIN SODIUM 100 MG/ML
30 INJECTION SUBCUTANEOUS EVERY 24 HOURS
Status: DISCONTINUED | OUTPATIENT
Start: 2025-02-25 | End: 2025-02-25

## 2025-02-25 RX ORDER — HYDROMORPHONE HYDROCHLORIDE 1 MG/ML
INJECTION, SOLUTION INTRAMUSCULAR; INTRAVENOUS; SUBCUTANEOUS
Status: DISPENSED
Start: 2025-02-25 | End: 2025-02-26

## 2025-02-25 RX ORDER — SEVELAMER CARBONATE 800 MG/1
800 TABLET, FILM COATED ORAL
Status: DISCONTINUED | OUTPATIENT
Start: 2025-02-25 | End: 2025-02-28 | Stop reason: HOSPADM

## 2025-02-25 RX ORDER — ISOSORBIDE MONONITRATE 60 MG/1
120 TABLET, EXTENDED RELEASE ORAL EVERY MORNING
Status: DISCONTINUED | OUTPATIENT
Start: 2025-02-25 | End: 2025-02-28 | Stop reason: HOSPADM

## 2025-02-25 RX ADMIN — HYDROMORPHONE HYDROCHLORIDE 2 MG: 2 INJECTION INTRAMUSCULAR; INTRAVENOUS; SUBCUTANEOUS at 09:02

## 2025-02-25 RX ADMIN — SEVELAMER CARBONATE 800 MG: 800 TABLET, FILM COATED ORAL at 12:02

## 2025-02-25 RX ADMIN — CEFAZOLIN 2 G: 2 INJECTION, POWDER, FOR SOLUTION INTRAMUSCULAR; INTRAVENOUS at 03:02

## 2025-02-25 RX ADMIN — SEVELAMER CARBONATE 800 MG: 800 TABLET, FILM COATED ORAL at 06:02

## 2025-02-25 RX ADMIN — KETOROLAC TROMETHAMINE 15 MG: 30 INJECTION, SOLUTION INTRAMUSCULAR at 11:02

## 2025-02-25 RX ADMIN — ACETAMINOPHEN 1000 MG: 500 TABLET, FILM COATED ORAL at 09:02

## 2025-02-25 RX ADMIN — LIDOCAINE AND PRILOCAINE: 25; 25 CREAM TOPICAL at 09:02

## 2025-02-25 RX ADMIN — OXYCODONE HYDROCHLORIDE 5 MG: 5 TABLET ORAL at 07:02

## 2025-02-25 RX ADMIN — ISOSORBIDE MONONITRATE 120 MG: 60 TABLET, EXTENDED RELEASE ORAL at 08:02

## 2025-02-25 RX ADMIN — HYDROMORPHONE HYDROCHLORIDE 2 MG: 1 INJECTION, SOLUTION INTRAMUSCULAR; INTRAVENOUS; SUBCUTANEOUS at 06:02

## 2025-02-25 RX ADMIN — METRONIDAZOLE 500 MG: 5 INJECTION, SOLUTION INTRAVENOUS at 03:02

## 2025-02-25 RX ADMIN — HYDRALAZINE HYDROCHLORIDE 100 MG: 25 TABLET, FILM COATED ORAL at 09:02

## 2025-02-25 RX ADMIN — ACETAMINOPHEN 1000 MG: 500 TABLET, FILM COATED ORAL at 06:02

## 2025-02-25 RX ADMIN — MUPIROCIN 1 G: 20 OINTMENT TOPICAL at 09:02

## 2025-02-25 RX ADMIN — HYDROMORPHONE HYDROCHLORIDE 2 MG: 2 INJECTION INTRAMUSCULAR; INTRAVENOUS; SUBCUTANEOUS at 04:02

## 2025-02-25 RX ADMIN — APIXABAN 2.5 MG: 2.5 TABLET, FILM COATED ORAL at 06:02

## 2025-02-25 RX ADMIN — HYDROMORPHONE HYDROCHLORIDE 2 MG: 2 INJECTION INTRAMUSCULAR; INTRAVENOUS; SUBCUTANEOUS at 08:02

## 2025-02-25 RX ADMIN — BISACODYL 5 MG: 5 TABLET, COATED ORAL at 09:02

## 2025-02-25 RX ADMIN — HYDROMORPHONE HYDROCHLORIDE 2 MG: 2 INJECTION INTRAMUSCULAR; INTRAVENOUS; SUBCUTANEOUS at 03:02

## 2025-02-25 RX ADMIN — CHOLECALCIFEROL TAB 125 MCG (5000 UNIT) 5000 UNITS: 125 TAB at 08:02

## 2025-02-25 RX ADMIN — METOPROLOL SUCCINATE 150 MG: 50 TABLET, EXTENDED RELEASE ORAL at 09:02

## 2025-02-25 RX ADMIN — HYDROMORPHONE HYDROCHLORIDE 1 MG: 1 INJECTION, SOLUTION INTRAMUSCULAR; INTRAVENOUS; SUBCUTANEOUS at 03:02

## 2025-02-25 RX ADMIN — OXYCODONE HYDROCHLORIDE 10 MG: 10 TABLET ORAL at 11:02

## 2025-02-25 NOTE — PLAN OF CARE
Problem: Adult Inpatient Plan of Care  Goal: Plan of Care Review  Outcome: Progressing  Goal: Patient-Specific Goal (Individualized)  Outcome: Progressing  Goal: Absence of Hospital-Acquired Illness or Injury  Outcome: Progressing  Goal: Optimal Comfort and Wellbeing  Outcome: Progressing     Problem: Sepsis/Septic Shock  Goal: Absence of Infection Signs and Symptoms  Outcome: Progressing     Problem: Wound  Goal: Optimal Functional Ability  Outcome: Progressing  Goal: Absence of Infection Signs and Symptoms  Outcome: Progressing  Goal: Optimal Pain Control and Function  Outcome: Progressing

## 2025-02-25 NOTE — CARE UPDATE
Patient arrived to [facility] main proximally 5:30 p.m. patient in 10/10 abdominal pain but he is still passing gas.  States moving around increases his pain and likely the ambulance ride over here worsened his pain.  Abdominal exam is soft , no guarding, and wounds look good, BERNABE drain with minimal serosanguineous drainage.  Vascular surgery is consulted for clotted off fistula and unable to do dialysis today.  NPO at midnight.  Added scheduled 1000 mg Tylenol t.i.d. in addition to p.r.n. Dilaudid he already had.  Patient wishes to be full code.  States he is on the list for a kidney transplant and does not want any blood transfusions if needed unless absolutely necessary and they need to be run through a filter.

## 2025-02-25 NOTE — SUBJECTIVE & OBJECTIVE
Interval History:   Seen and examined at multidisciplinary rounds, patient is awake alert, AAO x3, no fever or chills, no nausea vomiting diarrhea.  Passing gas but no bowel movement.  Pending dialysis.     Review of Systems  Objective:     Vital Signs (Most Recent):  Temp: 97.6 °F (36.4 °C) (02/25/25 0736)  Pulse: 76 (02/25/25 0737)  Resp: 16 (02/25/25 0915)  BP: (!) 145/92 (02/25/25 0736)  SpO2: 95 % (02/25/25 0737) Vital Signs (24h Range):  Temp:  [97.5 °F (36.4 °C)-98.1 °F (36.7 °C)] 97.6 °F (36.4 °C)  Pulse:  [70-82] 76  Resp:  [12-22] 16  SpO2:  [88 %-100 %] 95 %  BP: (117-176)/(59-95) 145/92     Weight: 114.3 kg (252 lb)  Body mass index is 32.35 kg/m².    Intake/Output Summary (Last 24 hours) at 2/25/2025 1110  Last data filed at 2/25/2025 0724  Gross per 24 hour   Intake 1640 ml   Output 330 ml   Net 1310 ml         Physical Exam  Vitals and nursing note reviewed.   Eyes:      Pupils: Pupils are equal, round, and reactive to light.   Neck:      Vascular: No JVD.   Cardiovascular:      Rate and Rhythm: Normal rate and regular rhythm.      Heart sounds: Normal heart sounds.   Pulmonary:      Effort: Pulmonary effort is normal.      Breath sounds: Normal breath sounds.   Abdominal:      General: Bowel sounds are normal. There is no distension.      Palpations: Abdomen is soft.      Tenderness: There is no abdominal tenderness.      Comments: Surgical site is dry and clean and packed.    Musculoskeletal:         General: No deformity.   Lymphadenopathy:      Cervical: No cervical adenopathy.   Skin:     Coloration: Skin is not pale.      Findings: No rash.      Comments: AV fistula to left forearm, good thrills.    Neurological:      Mental Status: He is oriented to person, place, and time. Mental status is at baseline.             Significant Labs: All pertinent labs within the past 24 hours have been reviewed.  CBC:   Recent Labs   Lab 02/25/25  0554   WBC 10.62  10.62   HGB 11.0*  11.0*   HCT 33.0*  33.0*      256     CMP:   Recent Labs   Lab 02/24/25  0952 02/25/25  0554   NA  --  137  137   K 3.9 4.3  4.3   CL  --  94*  94*   CO2  --  21*  21*   GLU  --  95  95   BUN  --  76*  76*   CREATININE  --  15.5*  15.5*   CALCIUM  --  7.2*  7.2*   PROT  --  8.0   ALBUMIN  --  3.6   BILITOT  --  0.3   ALKPHOS  --  53   AST  --  14   ALT  --  17   ANIONGAP  --  22*  22*       Significant Imaging: I have reviewed all pertinent imaging results/findings within the past 24 hours.  I have reviewed and interpreted all pertinent imaging results/findings within the past 24 hours.    Scheduled Meds:   bisacodyL  5 mg Oral QHS    cholecalciferol (vitamin D3)  5,000 Units Oral QAM    enoxparin  30 mg Subcutaneous Daily    gabapentin  200 mg Oral BID    hydrALAZINE  100 mg Oral Q8H    isosorbide mononitrate  120 mg Oral QAM    metoprolol succinate  150 mg Oral Daily    mupirocin   Nasal BID    sevelamer carbonate  800 mg Oral TID WM     Continuous Infusions:  PRN Meds:.  Current Facility-Administered Medications:     benzonatate, 200 mg, Oral, TID PRN    cloNIDine, 0.3 mg, Oral, TID PRN    diphenhydrAMINE, 12.5 mg, Intravenous, Q6H PRN    guaiFENesin 100 mg/5 ml, 200 mg, Oral, Q4H PRN    HYDROmorphone, 2 mg, Intravenous, Q4H PRN    lorazepam, 0.25 mg, Intravenous, Q4H PRN    naloxone, 0.02 mg, Intravenous, PRN    ondansetron, 4 mg, Intravenous, Q6H PRN    oxyCODONE, 5 mg, Oral, Q4H PRN    CT Abdomen Pelvis  Without Contrast  Result Date: 1/28/2025  EXAMINATION: CT ABDOMEN PELVIS WITHOUT CONTRAST CLINICAL HISTORY: Eval prior to reversal of colostomy/ Pt ESRD Stage 4; Encounter for follow-up examination after completed treatment for conditions other than malignant neoplasm TECHNIQUE: Low dose axial images, sagittal and coronal reformations were obtained from the lung bases to the pubic symphysis. COMPARISON: CT abdomen pelvis of October 25, 2024. FINDINGS: The liver is of normal size contour and CT density for a noncontrast  study.  A focal liver mass is not seen.  The gallbladder is of normal size without CT evidence of stone.  The pancreas is of normal contour and CT density without edema or mass.  The spleen is small and of normal CT density. The adrenal glands are not enlarged.  The kidneys are small.  The right kidney measures 6.7 cm in length, the left kidney measures 6.1 cm in length.  A stone is not seen.  There is a  water density 2.6 cm cyst of the midpole of the left kidney.  A stone or hydronephrosis is not seen.  The abdominal aorta and inferior vena cava are of normal caliber. The stomach is of normal configuration.  Small bowel dilatation or air-fluid levels are not seen.  The patient has had resection of the left colon with a Aidan pouch and the transverse colon leading to a colostomy in the of the left pelvic wall.  Bowel obstruction is not demonstrated.  Few diverticuli are seen in the cecum.  A colon mass is not identified.  A normal appendix is seen.  Retroperitoneal or pelvic adenopathy is not identified.  Postoperative changes are noted in the left inguinal region with a possible small postop organizing hematoma extending  adjacent to the spermatic cord.  The bladder is of normal contour without mass or asymmetry.  The prostate is not enlarged.     Prior left colon resection with colostomy to the left pelvic wall and a Aidan's pouch.  A recurrent mass is not identified.  Postoperative changes in the left groin with a probable small organizing hematoma.  Mild diverticulosis coli in the cecum Bilateral small kidneys.  2.6 cm cyst of the left kidney Electronically signed by: Everett Castellano MD Date:    01/28/2025 Time:    11:48  - pulls last radiology orders

## 2025-02-25 NOTE — PROGRESS NOTES
Pharmacist Renal Dose Adjustment Note    João Ham is a 44 y.o. male being treated with the medication Enoxaparin    Patient Data:    Vital Signs (Most Recent):  Temp: 97.6 °F (36.4 °C) (02/25/25 0736)  Pulse: 76 (02/25/25 0737)  Resp: 16 (02/25/25 0915)  BP: (!) 145/92 (02/25/25 0736)  SpO2: 95 % (02/25/25 0737) Vital Signs (72h Range):  Temp:  [97.5 °F (36.4 °C)-98.2 °F (36.8 °C)]   Pulse:  [70-82]   Resp:  [12-22]   BP: (117-176)/(59-95)   SpO2:  [88 %-100 %]      Recent Labs   Lab 02/25/25  0554   CREATININE 15.5*  15.5*     Serum creatinine: 15.5 mg/dL (H) 02/25/25 0554  Estimated creatinine clearance: 8.3 mL/min (A)    Medication:Enoxaparin dose: 40 mg frequency daily will be changed to medication:Enoxaparin dose:30 mg frequency:daily    Pharmacist's Name: Horacio Wilks  Pharmacist's Extension: [phone]

## 2025-02-25 NOTE — PROGRESS NOTES
POD 1 s/p ec lap with colostomy reversal  Pt doing well.  No n/v.  No flatus or BM  He is concerned about his fistula as he doesn't feel the thrill    Wt Readings from Last 3 Encounters:   02/24/25 114.3 kg (252 lb)   02/17/25 114.3 kg (252 lb)   02/13/25 114.3 kg (252 lb)     Temp Readings from Last 3 Encounters:   02/25/25 97.8 °F (36.6 °C) (Oral)   02/17/25 98.4 °F (36.9 °C)   01/14/25 99.7 °F (37.6 °C) (Temporal)     BP Readings from Last 3 Encounters:   02/25/25 135/85   02/17/25 (!) 176/94   01/14/25 (!) 96/57     Pulse Readings from Last 3 Encounters:   02/25/25 80   02/17/25 80   01/14/25 101     AAox3  Sinus  Soft/nd/appt ttp  Faint thrill noted in R arm fistula.  Not strong    Lab Results   Component Value Date    WBC 10.62 02/25/2025    WBC 10.62 02/25/2025    HGB 11.0 (L) 02/25/2025    HGB 11.0 (L) 02/25/2025    HCT 33.0 (L) 02/25/2025    HCT 33.0 (L) 02/25/2025    MCV 92 02/25/2025    MCV 92 02/25/2025     02/25/2025     02/25/2025       BMP  Lab Results   Component Value Date     02/25/2025     02/25/2025    K 4.3 02/25/2025    K 4.3 02/25/2025    CL 94 (L) 02/25/2025    CL 94 (L) 02/25/2025    CO2 21 (L) 02/25/2025    CO2 21 (L) 02/25/2025    BUN 76 (H) 02/25/2025    BUN 76 (H) 02/25/2025    CREATININE 15.5 (H) 02/25/2025    CREATININE 15.5 (H) 02/25/2025    CALCIUM 7.2 (L) 02/25/2025    CALCIUM 7.2 (L) 02/25/2025    ANIONGAP 22 (H) 02/25/2025    ANIONGAP 22 (H) 02/25/2025    EGFRNORACEVR 4 (A) 02/25/2025    EGFRNORACEVR 4 (A) 02/25/2025     A/P: POD 1 s/p colostomy reversal  Adv to full liquid diet  Ambulate  Will disc with hospitalist.   May need transfer to [facility] for evaluation by vasc surg

## 2025-02-25 NOTE — NURSING
Pt arrived to unit via stretcher. V/s obtained, pt c/o pain after transferring to bed. Pt encouraged to splint. Will administer PRN pain medication. POC resumed.

## 2025-02-25 NOTE — PLAN OF CARE
Problem: Adult Inpatient Plan of Care  Goal: Plan of Care Review  Outcome: Progressing  Goal: Patient-Specific Goal (Individualized)  Outcome: Progressing  Goal: Absence of Hospital-Acquired Illness or Injury  Outcome: Progressing  Goal: Optimal Comfort and Wellbeing  Outcome: Progressing  Goal: Readiness for Transition of Care  Outcome: Progressing     Problem: Sepsis/Septic Shock  Goal: Optimal Coping  Outcome: Progressing  Goal: Absence of Bleeding  Outcome: Progressing  Goal: Blood Glucose Level Within Targeted Range  Outcome: Progressing  Goal: Absence of Infection Signs and Symptoms  Outcome: Progressing  Goal: Optimal Nutrition Intake  Outcome: Progressing     Problem: Wound  Goal: Optimal Coping  Outcome: Progressing  Goal: Optimal Functional Ability  Outcome: Progressing  Goal: Absence of Infection Signs and Symptoms  Outcome: Progressing  Goal: Improved Oral Intake  Outcome: Progressing  Goal: Optimal Pain Control and Function  Outcome: Progressing  Goal: Skin Health and Integrity  Outcome: Progressing  Goal: Optimal Wound Healing  Outcome: Progressing     Problem: Infection  Goal: Absence of Infection Signs and Symptoms  Outcome: Progressing     Problem: Fall Injury Risk  Goal: Absence of Fall and Fall-Related Injury  Outcome: Progressing     Problem: Hemodialysis  Goal: Safe, Effective Therapy Delivery  Outcome: Progressing  Goal: Effective Tissue Perfusion  Outcome: Progressing  Goal: Absence of Infection Signs and Symptoms  Outcome: Progressing     Problem: Violence Risk or Actual  Goal: Anger and Impulse Control  Outcome: Progressing

## 2025-02-25 NOTE — CONSULTS
INPATIENT NEPHROLOGY CONSULT   [facility]    Patient Name: João Ham  Date: 02/25/2025    Reason for consultation: ESRD    Chief Complaint: Colostomy reversal    History of Present Illness:  44-year-old male with a history of ESRD on hemodialysis awaiting for renal transplant, hypertension, chronic colostomy status presenting here for colostomy reversal. On 9/2024, patient was hospitalized for inguinal hernia gangrene. Found to have colonic perforation due to mesh erosion with an abscess. Patient had sigmoidectomy with colostomy since then. Now patient is presenting for a colostomy reversal, prior to this surgery patient had colonoscopy through the stoma by Dr. Connors which is unremarkable. Patient usually has dialysis Monday Wednesday Friday, AV fistula to the left forearm. Patient had dialysis last Saturday with anticipation of surgery.     Interval History:  2/25- HD today and then resume MWF  Update- no thrill/bruit noted on AVF- get u/s of HD access now    Plan of Care:    Assessment:  ESRD on HD MWF  HTN  SHPT  Anemia of CKD    Plan:    - HD today and then MWF  - resume home BP meds- UF 1-3L  - resume binders with meals, D3, calcitriol   - no acute LOR needs    Patient requesting script for emla cream be sent to hospital pharmacy to be filled prior to discharge.    Thank you for allowing us to participate in this patient's care. We will continue to follow.    Vital Signs:  Temp Readings from Last 3 Encounters:   02/25/25 97.6 °F (36.4 °C) (Oral)   02/17/25 98.4 °F (36.9 °C)   01/14/25 99.7 °F (37.6 °C) (Temporal)       Pulse Readings from Last 3 Encounters:   02/25/25 76   02/17/25 80   01/14/25 101       BP Readings from Last 3 Encounters:   02/25/25 (!) 145/92   02/17/25 (!) 176/94   01/14/25 (!) 96/57       Weight:  Wt Readings from Last 3 Encounters:   02/24/25 114.3 kg (252 lb)   02/17/25 114.3 kg (252 lb)   02/13/25 114.3 kg (252 lb)       Past Medical & Surgical History:  Past  Medical History:   Diagnosis Date    Allergy     Anemia     Anxiety     CHF (congestive heart failure)     Depression     Dialysis patient     High blood pressure with chronic kidney disease, stage 5 chronic kidney disease or end stage renal disease     Hypertension     Sleep apnea        Past Surgical History:   Procedure Laterality Date    ABSCESS DRAINAGE Left 9/17/2024    Procedure: DRAINAGE, ABSCESS, GROIN;  Surgeon: Galileo Gonzáles MD;  Location: [facility] OR;  Service: General;  Laterality: Left;    COLON RESECTION  9/25/2024    Procedure: COLON RESECTION;  Surgeon: Galileo Gonzáles MD;  Location: [facility] OR;  Service: General;;    COLONOSCOPY N/A 2/17/2025    Procedure: COLONOSCOPY;  Surgeon: Galileo Gonzáles MD;  Location: [facility] ENDO;  Service: Endoscopy;  Laterality: N/A;  gonzáles    FISTULOGRAM Bilateral 4/30/2024    Procedure: Fistulogram;  Surgeon: Khoobehi, Ali, MD;  Location: [facility] CATH/EP LAB;  Service: Vascular;  Laterality: Bilateral;    FISTULOGRAM Left 9/24/2024    Procedure: Fistulogram;  Surgeon: Lorraine Salvador MD;  Location: [facility] CATH/EP LAB;  Service: General;  Laterality: Left;    HERNIA REPAIR Right     With mesh    INSERTION, CATHETER, TUNNELED N/A 6/22/2023    Procedure: Insertion,catheter,tunneled;  Surgeon: Everett Caicedo MD;  Location: [facility] OR;  Service: General;  Laterality: N/A;    LAPAROTOMY, EXPLORATORY N/A 9/25/2024    Procedure: LAPAROTOMY, EXPLORATORY;  Surgeon: Galileo Gonzáles MD;  Location: [facility] OR;  Service: General;  Laterality: N/A;    MOBILIZATION OF SPLENIC FLEXURE  9/25/2024    Procedure: MOBILIZATION, SPLENIC FLEXURE;  Surgeon: Galileo Gonzáles MD;  Location: [facility] OR;  Service: General;;    PERCUTANEOUS TRANSLUMINAL ANGIOPLASTY OF ARTERIOVENOUS FISTULA Left 4/30/2024    Procedure: PTA, AV FISTULA;  Surgeon: Khoobehi, Ali, MD;  Location: [facility] CATH/EP LAB;  Service: Vascular;  Laterality: Left;    PERCUTANEOUS TRANSLUMINAL ANGIOPLASTY OF ARTERIOVENOUS FISTULA Left 9/24/2024     Procedure: PTA, AV FISTULA;  Surgeon: Lorraine Salvador MD;  Location: [facility] CATH/EP LAB;  Service: General;  Laterality: Left;    PHLEBOGRAPHY N/A 7/19/2024    Procedure: Venogram;  Surgeon: Khoobehi, Ali, MD;  Location: [facility] CATH/EP LAB;  Service: Vascular;  Laterality: N/A;    REMOVAL, TUNNELED CATH Right 7/19/2024    Procedure: REMOVAL, TUNNELED CATH;  Surgeon: Khoobehi, Ali, MD;  Location: [facility] CATH/EP LAB;  Service: Vascular;  Laterality: Right;    ROBOT-ASSISTED LAPAROSCOPIC RESECTION OF SIGMOID COLON USING DA DELANO XI N/A 9/17/2024    Procedure: XI ROBOTIC SIGMOID RESECTION, WITH ANASTAMOSIS;  Surgeon: Galileo Connors MD;  Location: [facility] OR;  Service: General;  Laterality: N/A;  possible open have instruments available       Past Social History:  Social History     Socioeconomic History    Marital status: Single   Tobacco Use    Smoking status: Never     Passive exposure: Never    Smokeless tobacco: Never   Substance and Sexual Activity    Alcohol use: Never    Drug use: Never    Sexual activity: Not Currently   Social History Narrative    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     Social Drivers of Health     Financial Resource Strain: Medium Risk (2/24/2025)    Overall Financial Resource Strain (CARDIA)     Difficulty of Paying Living Expenses: Somewhat hard   Food Insecurity: Food Insecurity Present (2/24/2025)    Hunger Vital Sign     Worried About Running Out of Food in the Last Year: Sometimes true     Ran Out of Food in the Last Year: Sometimes true   Transportation Needs: No Transportation Needs (11/26/2024)    PRAPARE - Transportation     Lack of Transportation (Medical): No     Lack of Transportation (Non-Medical): No   Physical Activity: Inactive (11/26/2024)    Exercise Vital Sign     Days of Exercise per Week: 0 days     Minutes of Exercise per Session: 0 min   Stress: No Stress Concern Present (2/24/2025)    [employer] - Occupational Stress Questionnaire     Feeling of Stress  : Only a little   Housing Stability: High Risk (2/24/2025)    Housing Stability Vital Sign     Unable to Pay for Housing in the Last Year: Yes     Homeless in the Last Year: No       Medications:  Scheduled Meds:   bisacodyL  5 mg Oral QHS    cholecalciferol (vitamin D3)  5,000 Units Oral QAM    enoxparin  40 mg Subcutaneous Daily    gabapentin  200 mg Oral BID    hydrALAZINE  100 mg Oral Q8H    isosorbide mononitrate  120 mg Oral QAM    metoprolol succinate  150 mg Oral Daily    mupirocin   Nasal BID    sevelamer carbonate  800 mg Oral TID WM     Continuous Infusions:  PRN Meds:.  Current Facility-Administered Medications:     benzonatate, 200 mg, Oral, TID PRN    cloNIDine, 0.3 mg, Oral, TID PRN    diphenhydrAMINE, 12.5 mg, Intravenous, Q6H PRN    guaiFENesin 100 mg/5 ml, 200 mg, Oral, Q4H PRN    HYDROmorphone, 2 mg, Intravenous, Q4H PRN    lorazepam, 0.25 mg, Intravenous, Q4H PRN    naloxone, 0.02 mg, Intravenous, PRN    ondansetron, 4 mg, Intravenous, Q6H PRN    oxyCODONE, 5 mg, Oral, Q4H PRN  Medications Ordered Prior to Encounter[1]    Allergies:  Mushroom    Past Family History:  Reviewed; refer to Hospitalist Admission Note    Review of Systems:  Review of Systems - All 14 systems reviewed and negative, except as noted in HPI    Physical Exam:  General Appearance:    NAD, AAO x 3, cooperative, appears stated age   Head:    Normocephalic, atraumatic   Eyes:    PER, EOMI, and conjunctiva/sclera clear bilaterally       Mouth:   Moist mucus membranes, no thrush or oral lesions,       normal dentition   Back:     No CVA tenderness   Lungs:     Clear to auscultation bilaterally, no wheezes, crackles,           rales or rhonchi, symmetric air movement, respirations unlabored   Chest wall:    No tenderness or deformity   Heart:    Regular rate and rhythm, S1 and S2 normal, no murmur, rub   or gallop   Abdomen:     Soft, non-tender, non-distended, bowel sounds active all four   quadrants, no RT or guarding, no  masses, no organomegaly   Extremities:   Warm and well perfused, distal pulses are intact, no             cyanosis or peripheral edema   MSK:   No joint or muscle swelling, tenderness or deformity   Skin:   Skin color, texture, turgor normal, no rashes or lesions   Neurologic/Psychiatric:   CNII-XII intact, normal strength and sensation       throughout, no asterixis; normal affect, memory, judgement     and insight      Results:  Lab Results   Component Value Date     02/25/2025     02/25/2025    K 4.3 02/25/2025    K 4.3 02/25/2025    CL 94 (L) 02/25/2025    CL 94 (L) 02/25/2025    CO2 21 (L) 02/25/2025    CO2 21 (L) 02/25/2025    BUN 76 (H) 02/25/2025    BUN 76 (H) 02/25/2025    CREATININE 15.5 (H) 02/25/2025    CREATININE 15.5 (H) 02/25/2025    CALCIUM 7.2 (L) 02/25/2025    CALCIUM 7.2 (L) 02/25/2025    ANIONGAP 22 (H) 02/25/2025    ANIONGAP 22 (H) 02/25/2025       Lab Results   Component Value Date    CALCIUM 7.2 (L) 02/25/2025    CALCIUM 7.2 (L) 02/25/2025    PHOS 12.7 (HH) 02/25/2025       Recent Labs   Lab 02/25/25  0554   WBC 10.62  10.62   RBC 3.58*  3.58*   HGB 11.0*  11.0*   HCT 33.0*  33.0*     256   MCV 92  92   MCH 30.7  30.7   MCHC 33.3  33.3       I have personally reviewed pertinent radiological imaging and reports from this admission.    I have spent > 70 minutes providing care for this patient for the above diagnoses. These services have included chart/data/imaging review, evaluation, exam, formulation of plan, , note preparation, and discussions with staff involved in this patient's care.    Oni Garcia MD MPH  [facility]  [address] [place] [id number]  [phone] (p)  [phone] (f)         [1]   No current facility-administered medications on file prior to encounter.     Current Outpatient Medications on File Prior to Encounter   Medication Sig Dispense Refill    cholecalciferol, vitamin D3, 125 mcg (5,000 unit) Tab Take  5,000 Units by mouth every morning.      cloNIDine (CATAPRES) 0.3 MG tablet Take 1 tablet (0.3 mg total) by mouth 3 (three) times daily as needed (SBP > 150).      hydrALAZINE (APRESOLINE) 100 MG tablet Take 1 tablet (100 mg total) by mouth every 8 (eight) hours. Hold for SBP < 120 and before dialysis 270 tablet 0    isosorbide mononitrate (IMDUR) 120 MG 24 hr tablet Take 120 mg by mouth every morning.      metoprolol succinate (TOPROL-XL) 50 MG 24 hr tablet Take 150 mg by mouth once daily.      olmesartan (BENICAR) 40 MG tablet Take 1 tablet (40 mg total) by mouth once daily. 90 tablet 0    apixaban (ELIQUIS) 2.5 mg Tab Take 1 tablet (2.5 mg total) by mouth 2 (two) times daily.      calcitRIOL (ROCALTROL) 0.25 MCG Cap Take 1 capsule (0.25 mcg total) by mouth once daily. (Patient not taking: Reported on 1/14/2025)      calcium carbonate (TUMS) 200 mg calcium (500 mg) chewable tablet Take 2 tablets (1,000 mg total) by mouth 3 (three) times daily. (Patient not taking: Reported on 1/14/2025) 180 tablet 11    epoetin mily-epbx (RETACRIT) 20,000 unit/mL injection Inject 0.67 mLs (13,400 Units total) into the skin every Mon, Wed, Fri.      heparin sodium,porcine (HEPARIN, PORCINE,) 1,000 unit/mL injection Inject 4 mLs (4,000 Units total) into the vein as needed (line care after dialysis).      heparin sodium,porcine (HEPARIN, PORCINE,) 5,000 unit/mL injection Inject 1 mL (5,000 Units total) into the skin every 8 (eight) hours.      ketoconazole (NIZORAL) 2 % shampoo Apply topically every other day. (Patient not taking: Reported on 1/14/2025)      LIDOcaine-prilocaine (EMLA) cream Apply topically as needed (pre dialysis). 30 g 2    miconazole (MICOTIN) 2 % cream Apply topically 2 (two) times daily. (Patient not taking: Reported on 1/14/2025)      sevelamer carbonate (RENVELA) 2.4 gram PwPk Take by mouth.

## 2025-02-25 NOTE — PROGRESS NOTES
[facility] Medicine  Progress Note    Patient Name: João Ham  MRN: [id number]  Patient Class: IP- Inpatient   Admission Date: 2/24/2025  Length of Stay: 1 days  Attending Physician: Galileo Connors MD  Primary Care Provider: Dawson Granado MD        Subjective     Principal Problem:Colostomy status        HPI:  44-year-old male with a history of ESRD on hemodialysis awaiting for renal transplant, hypertension, chronic colostomy status presenting here for colostomy reversal.  On 9/2024, patient was hospitalized for inguinal hernia gangrene.  Found to have colonic perforation due to mesh erosion with an abscess.  Patient had sigmoidectomy with colostomy since then.  Now patient is presenting for a colostomy reversal, prior to this surgery patient had colonoscopy through the stoma by Dr. Connors which is unremarkable.  Patient is seen and examined in the PACU, still lethargic.  Lab work reviewed.  Patient is usually have dialysis Monday Wednesday Friday, AV fistula to the left forearm.  Patient had dialysis last Saturday with anticipation of surgery.  Patient is due dialysis tomorrow.     Overview/Hospital Course:  No notes on file    Interval History:   Seen and examined at multidisciplinary rounds, patient is awake alert, AAO x3, no fever or chills, no nausea vomiting diarrhea.  Passing gas but no bowel movement.  Pending dialysis.     Review of Systems  Objective:     Vital Signs (Most Recent):  Temp: 97.6 °F (36.4 °C) (02/25/25 0736)  Pulse: 76 (02/25/25 0737)  Resp: 16 (02/25/25 0915)  BP: (!) 145/92 (02/25/25 0736)  SpO2: 95 % (02/25/25 0737) Vital Signs (24h Range):  Temp:  [97.5 °F (36.4 °C)-98.1 °F (36.7 °C)] 97.6 °F (36.4 °C)  Pulse:  [70-82] 76  Resp:  [12-22] 16  SpO2:  [88 %-100 %] 95 %  BP: (117-176)/(59-95) 145/92     Weight: 114.3 kg (252 lb)  Body mass index is 32.35 kg/m².    Intake/Output Summary (Last 24 hours) at 2/25/2025 1110  Last data filed at 2/25/2025  0724  Gross per 24 hour   Intake 1640 ml   Output 330 ml   Net 1310 ml         Physical Exam  Vitals and nursing note reviewed.   Eyes:      Pupils: Pupils are equal, round, and reactive to light.   Neck:      Vascular: No JVD.   Cardiovascular:      Rate and Rhythm: Normal rate and regular rhythm.      Heart sounds: Normal heart sounds.   Pulmonary:      Effort: Pulmonary effort is normal.      Breath sounds: Normal breath sounds.   Abdominal:      General: Bowel sounds are normal. There is no distension.      Palpations: Abdomen is soft.      Tenderness: There is no abdominal tenderness.      Comments: Surgical site is dry and clean and packed.    Musculoskeletal:         General: No deformity.   Lymphadenopathy:      Cervical: No cervical adenopathy.   Skin:     Coloration: Skin is not pale.      Findings: No rash.      Comments: AV fistula to left forearm, good thrills.    Neurological:      Mental Status: He is oriented to person, place, and time. Mental status is at baseline.             Significant Labs: All pertinent labs within the past 24 hours have been reviewed.  CBC:   Recent Labs   Lab 02/25/25  0554   WBC 10.62  10.62   HGB 11.0*  11.0*   HCT 33.0*  33.0*     256     CMP:   Recent Labs   Lab 02/24/25  0952 02/25/25  0554   NA  --  137  137   K 3.9 4.3  4.3   CL  --  94*  94*   CO2  --  21*  21*   GLU  --  95  95   BUN  --  76*  76*   CREATININE  --  15.5*  15.5*   CALCIUM  --  7.2*  7.2*   PROT  --  8.0   ALBUMIN  --  3.6   BILITOT  --  0.3   ALKPHOS  --  53   AST  --  14   ALT  --  17   ANIONGAP  --  22*  22*       Significant Imaging: I have reviewed all pertinent imaging results/findings within the past 24 hours.  I have reviewed and interpreted all pertinent imaging results/findings within the past 24 hours.    Scheduled Meds:   bisacodyL  5 mg Oral QHS    cholecalciferol (vitamin D3)  5,000 Units Oral QAM    enoxparin  30 mg Subcutaneous Daily    gabapentin  200 mg Oral BID     hydrALAZINE  100 mg Oral Q8H    isosorbide mononitrate  120 mg Oral QAM    metoprolol succinate  150 mg Oral Daily    mupirocin   Nasal BID    sevelamer carbonate  800 mg Oral TID WM     Continuous Infusions:  PRN Meds:.  Current Facility-Administered Medications:     benzonatate, 200 mg, Oral, TID PRN    cloNIDine, 0.3 mg, Oral, TID PRN    diphenhydrAMINE, 12.5 mg, Intravenous, Q6H PRN    guaiFENesin 100 mg/5 ml, 200 mg, Oral, Q4H PRN    HYDROmorphone, 2 mg, Intravenous, Q4H PRN    lorazepam, 0.25 mg, Intravenous, Q4H PRN    naloxone, 0.02 mg, Intravenous, PRN    ondansetron, 4 mg, Intravenous, Q6H PRN    oxyCODONE, 5 mg, Oral, Q4H PRN    CT Abdomen Pelvis  Without Contrast  Result Date: 1/28/2025  EXAMINATION: CT ABDOMEN PELVIS WITHOUT CONTRAST CLINICAL HISTORY: Eval prior to reversal of colostomy/ Pt ESRD Stage 4; Encounter for follow-up examination after completed treatment for conditions other than malignant neoplasm TECHNIQUE: Low dose axial images, sagittal and coronal reformations were obtained from the lung bases to the pubic symphysis. COMPARISON: CT abdomen pelvis of October 25, 2024. FINDINGS: The liver is of normal size contour and CT density for a noncontrast study.  A focal liver mass is not seen.  The gallbladder is of normal size without CT evidence of stone.  The pancreas is of normal contour and CT density without edema or mass.  The spleen is small and of normal CT density. The adrenal glands are not enlarged.  The kidneys are small.  The right kidney measures 6.7 cm in length, the left kidney measures 6.1 cm in length.  A stone is not seen.  There is a  water density 2.6 cm cyst of the midpole of the left kidney.  A stone or hydronephrosis is not seen.  The abdominal aorta and inferior vena cava are of normal caliber. The stomach is of normal configuration.  Small bowel dilatation or air-fluid levels are not seen.  The patient has had resection of the left colon with a Aidan pouch and the  transverse colon leading to a colostomy in the of the left pelvic wall.  Bowel obstruction is not demonstrated.  Few diverticuli are seen in the cecum.  A colon mass is not identified.  A normal appendix is seen.  Retroperitoneal or pelvic adenopathy is not identified.  Postoperative changes are noted in the left inguinal region with a possible small postop organizing hematoma extending  adjacent to the spermatic cord.  The bladder is of normal contour without mass or asymmetry.  The prostate is not enlarged.     Prior left colon resection with colostomy to the left pelvic wall and a Aidan's pouch.  A recurrent mass is not identified.  Postoperative changes in the left groin with a probable small organizing hematoma.  Mild diverticulosis coli in the cecum Bilateral small kidneys.  2.6 cm cyst of the left kidney Electronically signed by: Everett Castellano MD Date:    01/28/2025 Time:    11:48  - pulls last radiology orders      Assessment and Plan     * Colostomy status    Status post reversal today.   Patient had  REVISION OR CLOSURE, COLOSTOMY (N/A)  Exploratory laparotomy   Lysis of adhesions  REctosigmoid resection.   [place]won of colostomy with resection  Creation of colorectal anastomosis.     Continue postoperative medical management as per General surgery.   Close monitor.     Anticoagulated  Resume Eliquis if okay with General surgery.     Essential hypertension  Patient's blood pressure range in the last 24 hours was: BP  Min: 131/84  Max: 131/84.The patient's inpatient anti-hypertensive regimen is listed below:  Current Antihypertensives       Plan  - BP is controlled, no changes needed to their regimen  -     ESRD (end stage renal disease)  Creatine stable for now. BMP reviewed- noted Estimated Creatinine Clearance: 8.3 mL/min (A) (based on SCr of 15.5 mg/dL (H)). according to latest data. Based on current GFR, CKD stage is end stage.  Monitor UOP and serial BMP and adjust therapy as needed. Renally dose  meds. Avoid nephrotoxic medications and procedures.    On hemodialysis, we will consult Nephrology for dialysis support.         VTE Risk Mitigation (From admission, onward)           Ordered     enoxaparin injection 30 mg  Daily         02/25/25 1049     IP VTE HIGH RISK PATIENT  Once         02/24/25 1628     Place sequential compression device  Until discontinued         02/24/25 1628                    Discharge Planning   MARIA ISABEL: 2/26/2025     Code Status: Full Code   Medical Readiness for Discharge Date:                            Forrest Mireles MD  Department of Hospital Medicine   [facility]/Surg

## 2025-02-25 NOTE — NURSING
Called to patient room and patient states he does not feel/hear his AV fistula. Assessed AV fistula at this time with stethscope. Noted a very faint bruit and unable to feel a thrill at this time. Charged nurse, Chintan notified. Dr. Garcia at bedside at this time AV fistula was assessed by her and Ultrasound was ordered. However, patient did state that a pink gauze drsg was placed over his fistula prior to surgery yesterday. Educated patient on care of AVF patient voiced understanding.

## 2025-02-25 NOTE — PLAN OF CARE
[facility] - Med/Surg  Initial Discharge Assessment       Primary Care Provider: Dawson Granado MD    Admission Diagnosis: Colostomy status [Z93.3]    Admission Date: 2/24/2025  Expected Discharge Date: 2/26/2025    Transition of Care Barriers: None    Payor: BLUE CROSS BLUE SHIELD / Plan: BCBS ALL OUT OF STATE / Product Type: PPO /     Extended Emergency Contact Information  Primary Emergency Contact: Khushboo Guidry  Mobile Phone: [phone]  Relation: Grandparent  Preferred language: English   needed? No  Secondary Emergency Contact: janae escamilla  Mobile Phone: [phone]  Relation: Daughter    Discharge Plan A: Home  Discharge Plan B: Home Health      [facility] [address]  [place] [address] [facility]  [address] [place] [address]  [facility] [address]  Phone: [phone] Fax: [phone]      DC assessment completed with patient at bedside. Verified information on facesheet as correct. Denies POA. NOK is 1 child, Janae. PCP is Dr. Granado, reports last apt was within the last 6 months. Pharmacy is [facility] on [address]. HD on [facility] on [address], 10 AM. On eliquis prior to admit. DME- bipap and bp monitor. Independent at baseline. Drives himself to apts/hd. Father will provide transportation home upon DC. Reports taking home medications as prescribed and can currently afford them. Denies recent inpt stay in last 30 days. Verified insurance on file. DC plan is home.   Initial Assessment (most recent)       Adult Discharge Assessment - 02/25/25 1220          Discharge Assessment    Assessment Type Discharge Planning Assessment     Confirmed/corrected address, phone number and insurance Yes     Confirmed Demographics Correct on Facesheet     Source of Information patient     Communicated MARIA ISABEL with patient/caregiver Yes     Reason For Admission planned sx- ostomy     People in Home alone     Facility Arrived From: home     Do you expect to return to your current living situation? Yes      Prior to hospitilization cognitive status: Alert/Oriented     Current cognitive status: Alert/Oriented     Walking or Climbing Stairs Difficulty no     Dressing/Bathing Difficulty no     Equipment Currently Used at Home BIPAP;blood pressure machine     Readmission within 30 days? No     Patient currently being followed by outpatient case management? No     Do you currently have service(s) that help you manage your care at home? No     Do you take prescription medications? Yes     Do you have prescription coverage? Yes     Do you have any problems affording any of your prescribed medications? No     Is the patient taking medications as prescribed? yes     Who is going to help you get home at discharge? father     How do you get to doctors appointments? car, drives self     Are you on dialysis? Yes     Dialysis Name and Scheduled days [facility] 10 AM     Do you take coumadin? No     Discharge Plan A Home     Discharge Plan B Home Health     DME Needed Upon Discharge  none     Discharge Plan discussed with: Patient     Transition of Care Barriers None

## 2025-02-25 NOTE — ASSESSMENT & PLAN NOTE
Creatine stable for now. BMP reviewed- noted Estimated Creatinine Clearance: 8.3 mL/min (A) (based on SCr of 15.5 mg/dL (H)). according to latest data. Based on current GFR, CKD stage is end stage.  Monitor UOP and serial BMP and adjust therapy as needed. Renally dose meds. Avoid nephrotoxic medications and procedures.    On hemodialysis, we will consult Nephrology for dialysis support.

## 2025-02-25 NOTE — HOSPITAL COURSE
44-year-old male with a history of ESRD on hemodialysis awaiting for renal transplant, hypertension, chronic colostomy status presenting here for colostomy reversal. Patient had colostomy reversal uneventfully,  patient is due for dialysis however was found to have AV fistula clotted, pending transfer to [facility] for vascular surgery evaluation.  Attempting adequate pain control, his pain is improving and belly is soft.  He had a bowel movement today.  He went for declotting of fistula by vascular surgery today but they are unable to do anesthesia so patient refused, done on 2/27 with anesthesia and then dialysis after that.  Repeat HD 2/28 to get back on MWF schedule.  Okay to discharge per Nephrology and General surgery with follow up as he tolerated regular diet and had BM.

## 2025-02-25 NOTE — ASSESSMENT & PLAN NOTE
Status post reversal today.   Patient had  REVISION OR CLOSURE, COLOSTOMY (N/A)  Exploratory laparotomy   Lysis of adhesions  REctosigmoid resection.   Takedwon of colostomy with resection  Creation of colorectal anastomosis.     Continue postoperative medical management as per General surgery.   Close monitor.

## 2025-02-26 ENCOUNTER — ANESTHESIA (OUTPATIENT)
Dept: CARDIOLOGY | Facility: HOSPITAL | Age: 45
End: 2025-02-26
Payer: COMMERCIAL

## 2025-02-26 ENCOUNTER — ANESTHESIA EVENT (OUTPATIENT)
Dept: CARDIOLOGY | Facility: HOSPITAL | Age: 45
End: 2025-02-26
Payer: COMMERCIAL

## 2025-02-26 PROBLEM — I77.0: Status: ACTIVE | Noted: 2025-02-26

## 2025-02-26 LAB
ANION GAP SERPL CALC-SCNC: 24 MMOL/L (ref 8–16)
BASOPHILS # BLD AUTO: 0.01 K/UL (ref 0–0.2)
BASOPHILS NFR BLD: 0.1 % (ref 0–1.9)
BUN SERPL-MCNC: 88 MG/DL (ref 6–20)
CALCIUM SERPL-MCNC: 6.9 MG/DL (ref 8.7–10.5)
CHLORIDE SERPL-SCNC: 89 MMOL/L (ref 95–110)
CO2 SERPL-SCNC: 18 MMOL/L (ref 23–29)
CREAT SERPL-MCNC: 18.3 MG/DL (ref 0.5–1.4)
DIFFERENTIAL METHOD BLD: ABNORMAL
EOSINOPHIL # BLD AUTO: 0.1 K/UL (ref 0–0.5)
EOSINOPHIL NFR BLD: 0.6 % (ref 0–8)
ERYTHROCYTE [DISTWIDTH] IN BLOOD BY AUTOMATED COUNT: 15.9 % (ref 11.5–14.5)
EST. GFR  (NO RACE VARIABLE): 2.9 ML/MIN/1.73 M^2
GLUCOSE SERPL-MCNC: 96 MG/DL (ref 70–110)
HCT VFR BLD AUTO: 30.4 % (ref 40–54)
HGB BLD-MCNC: 10.3 G/DL (ref 14–18)
IMM GRANULOCYTES # BLD AUTO: 0.06 K/UL (ref 0–0.04)
IMM GRANULOCYTES NFR BLD AUTO: 0.5 % (ref 0–0.5)
LYMPHOCYTES # BLD AUTO: 1.1 K/UL (ref 1–4.8)
LYMPHOCYTES NFR BLD: 10.3 % (ref 18–48)
MCH RBC QN AUTO: 31.1 PG (ref 27–31)
MCHC RBC AUTO-ENTMCNC: 33.9 G/DL (ref 32–36)
MCV RBC AUTO: 92 FL (ref 82–98)
MONOCYTES # BLD AUTO: 1.2 K/UL (ref 0.3–1)
MONOCYTES NFR BLD: 11.1 % (ref 4–15)
NEUTROPHILS # BLD AUTO: 8.5 K/UL (ref 1.8–7.7)
NEUTROPHILS NFR BLD: 77.4 % (ref 38–73)
NRBC BLD-RTO: 0 /100 WBC
PHOSPHATE SERPL-MCNC: 14.9 MG/DL (ref 2.7–4.5)
PLATELET # BLD AUTO: 224 K/UL (ref 150–450)
PMV BLD AUTO: 10.1 FL (ref 9.2–12.9)
POTASSIUM SERPL-SCNC: 4.7 MMOL/L (ref 3.5–5.1)
RBC # BLD AUTO: 3.31 M/UL (ref 4.6–6.2)
SODIUM SERPL-SCNC: 131 MMOL/L (ref 136–145)
WBC # BLD AUTO: 11.02 K/UL (ref 3.9–12.7)

## 2025-02-26 PROCEDURE — 90935 HEMODIALYSIS ONE EVALUATION: CPT

## 2025-02-26 PROCEDURE — 25000003 PHARM REV CODE 250: Performed by: SURGERY

## 2025-02-26 PROCEDURE — 80048 BASIC METABOLIC PNL TOTAL CA: CPT | Performed by: HOSPITALIST

## 2025-02-26 PROCEDURE — 63600175 PHARM REV CODE 636 W HCPCS: Mod: TB,JZ | Performed by: NURSE PRACTITIONER

## 2025-02-26 PROCEDURE — 12000002 HC ACUTE/MED SURGE SEMI-PRIVATE ROOM

## 2025-02-26 PROCEDURE — 99900035 HC TECH TIME PER 15 MIN (STAT)

## 2025-02-26 PROCEDURE — 36415 COLL VENOUS BLD VENIPUNCTURE: CPT | Performed by: HOSPITALIST

## 2025-02-26 PROCEDURE — 94761 N-INVAS EAR/PLS OXIMETRY MLT: CPT

## 2025-02-26 PROCEDURE — 25000003 PHARM REV CODE 250: Performed by: HOSPITALIST

## 2025-02-26 PROCEDURE — 63600175 PHARM REV CODE 636 W HCPCS: Performed by: SURGERY

## 2025-02-26 PROCEDURE — 25000003 PHARM REV CODE 250: Performed by: FAMILY MEDICINE

## 2025-02-26 PROCEDURE — 94799 UNLISTED PULMONARY SVC/PX: CPT

## 2025-02-26 PROCEDURE — 84100 ASSAY OF PHOSPHORUS: CPT | Performed by: HOSPITALIST

## 2025-02-26 PROCEDURE — 85025 COMPLETE CBC W/AUTO DIFF WBC: CPT | Performed by: HOSPITALIST

## 2025-02-26 PROCEDURE — 99900031 HC PATIENT EDUCATION (STAT)

## 2025-02-26 RX ORDER — LIDOCAINE AND PRILOCAINE 25; 25 MG/G; MG/G
CREAM TOPICAL ONCE
Status: DISCONTINUED | OUTPATIENT
Start: 2025-02-26 | End: 2025-02-27

## 2025-02-26 RX ADMIN — CHOLECALCIFEROL TAB 125 MCG (5000 UNIT) 5000 UNITS: 125 TAB at 05:02

## 2025-02-26 RX ADMIN — ISOSORBIDE MONONITRATE 120 MG: 60 TABLET, EXTENDED RELEASE ORAL at 05:02

## 2025-02-26 RX ADMIN — OXYCODONE HYDROCHLORIDE 10 MG: 10 TABLET ORAL at 08:02

## 2025-02-26 RX ADMIN — HYDROMORPHONE HYDROCHLORIDE 2 MG: 2 INJECTION INTRAMUSCULAR; INTRAVENOUS; SUBCUTANEOUS at 01:02

## 2025-02-26 RX ADMIN — SEVELAMER CARBONATE 800 MG: 800 TABLET, FILM COATED ORAL at 05:02

## 2025-02-26 RX ADMIN — HYDROMORPHONE HYDROCHLORIDE 2 MG: 2 INJECTION INTRAMUSCULAR; INTRAVENOUS; SUBCUTANEOUS at 09:02

## 2025-02-26 RX ADMIN — HYDRALAZINE HYDROCHLORIDE 100 MG: 25 TABLET, FILM COATED ORAL at 03:02

## 2025-02-26 RX ADMIN — MUPIROCIN 1 G: 20 OINTMENT TOPICAL at 08:02

## 2025-02-26 RX ADMIN — HYDRALAZINE HYDROCHLORIDE 100 MG: 25 TABLET, FILM COATED ORAL at 08:02

## 2025-02-26 RX ADMIN — BISACODYL 5 MG: 5 TABLET, COATED ORAL at 08:02

## 2025-02-26 RX ADMIN — SEVELAMER CARBONATE 800 MG: 800 TABLET, FILM COATED ORAL at 12:02

## 2025-02-26 RX ADMIN — OXYCODONE HYDROCHLORIDE 10 MG: 10 TABLET ORAL at 04:02

## 2025-02-26 RX ADMIN — HYDRALAZINE HYDROCHLORIDE 100 MG: 25 TABLET, FILM COATED ORAL at 05:02

## 2025-02-26 RX ADMIN — HYDROMORPHONE HYDROCHLORIDE 2 MG: 2 INJECTION INTRAMUSCULAR; INTRAVENOUS; SUBCUTANEOUS at 05:02

## 2025-02-26 RX ADMIN — ACETAMINOPHEN 1000 MG: 500 TABLET, FILM COATED ORAL at 03:02

## 2025-02-26 RX ADMIN — ONDANSETRON 4 MG: 2 INJECTION INTRAMUSCULAR; INTRAVENOUS at 05:02

## 2025-02-26 RX ADMIN — ACETAMINOPHEN 1000 MG: 500 TABLET, FILM COATED ORAL at 08:02

## 2025-02-26 RX ADMIN — OXYCODONE HYDROCHLORIDE 10 MG: 10 TABLET ORAL at 12:02

## 2025-02-26 RX ADMIN — APIXABAN 2.5 MG: 2.5 TABLET, FILM COATED ORAL at 08:02

## 2025-02-26 NOTE — CONSULTS
[facility]  Vascular Surgery  Consult Note    Inpatient consult to Vascular Surgery  Consult performed by: Lorraine Salvador MD  Consult ordered by: Marichuy Crum DO        Subjective:       History of Present Illness: Patient is a 44-year-old male with a history of ESRD on hemodialysis awaiting for renal transplant, HTN who is POD2 s/p colostomy reversal after colonic perforation due to mesh erosion with an abscess. After surgery patient noted that there was no thrill in his fistula. Prior to admission for surgery, patient had HD on Saturday. He reported no prolonged bleeding or swelling. He did say there was clot in the machine after his HD session.      Prescriptions Prior to Admission[1]    Review of patient's allergies indicates:   Allergen Reactions    Mushroom Swelling     Tongue swells    Tongue swells       Tongue swells       Past Medical History:   Diagnosis Date    Allergy     Anemia     Anxiety     CHF (congestive heart failure)     Depression     Dialysis patient     High blood pressure with chronic kidney disease, stage 5 chronic kidney disease or end stage renal disease     Hypertension     Sleep apnea      Past Surgical History:   Procedure Laterality Date    ABSCESS DRAINAGE Left 9/17/2024    Procedure: DRAINAGE, ABSCESS, GROIN;  Surgeon: Galileo Connors MD;  Location: [facility] OR;  Service: General;  Laterality: Left;    COLON RESECTION  9/25/2024    Procedure: COLON RESECTION;  Surgeon: Galileo Connors MD;  Location: [facility] OR;  Service: General;;    COLONOSCOPY N/A 2/17/2025    Procedure: COLONOSCOPY;  Surgeon: Galileo Connors MD;  Location: [facility] ENDO;  Service: Endoscopy;  Laterality: N/A;  connors    FISTULOGRAM Bilateral 4/30/2024    Procedure: Fistulogram;  Surgeon: Khoobehi, Ali, MD;  Location: [facility] CATH/EP LAB;  Service: Vascular;  Laterality: Bilateral;    FISTULOGRAM Left 9/24/2024    Procedure: Fistulogram;  Surgeon: Lorraine Salvador MD;  Location: [facility] CATH/EP LAB;   Service: General;  Laterality: Left;    HERNIA REPAIR Right     With mesh    INSERTION, CATHETER, TUNNELED N/A 6/22/2023    Procedure: Insertion,catheter,tunneled;  Surgeon: Everett Caicedo MD;  Location: [facility] OR;  Service: General;  Laterality: N/A;    LAPAROTOMY, EXPLORATORY N/A 9/25/2024    Procedure: LAPAROTOMY, EXPLORATORY;  Surgeon: Galileo Connors MD;  Location: [facility] OR;  Service: General;  Laterality: N/A;    MOBILIZATION OF SPLENIC FLEXURE  9/25/2024    Procedure: MOBILIZATION, SPLENIC FLEXURE;  Surgeon: Galileo Connors MD;  Location: [facility] OR;  Service: General;;    PERCUTANEOUS TRANSLUMINAL ANGIOPLASTY OF ARTERIOVENOUS FISTULA Left 4/30/2024    Procedure: PTA, AV FISTULA;  Surgeon: Khoobehi, Ali, MD;  Location: [facility] CATH/EP LAB;  Service: Vascular;  Laterality: Left;    PERCUTANEOUS TRANSLUMINAL ANGIOPLASTY OF ARTERIOVENOUS FISTULA Left 9/24/2024    Procedure: PTA, AV FISTULA;  Surgeon: Lorraine Salvador MD;  Location: [facility] CATH/EP LAB;  Service: General;  Laterality: Left;    PHLEBOGRAPHY N/A 7/19/2024    Procedure: Venogram;  Surgeon: Khoobehi, Ali, MD;  Location: [facility] CATH/EP LAB;  Service: Vascular;  Laterality: N/A;    REMOVAL, TUNNELED CATH Right 7/19/2024    Procedure: REMOVAL, TUNNELED CATH;  Surgeon: Khoobehi, Ali, MD;  Location: [facility] CATH/EP LAB;  Service: Vascular;  Laterality: Right;    REVISION COLOSTOMY N/A 2/24/2025    Procedure: REVISION OR CLOSURE, COLOSTOMY;  Surgeon: Galileo Connors MD;  Location: [facility] OR;  Service: General;  Laterality: N/A;    ROBOT-ASSISTED LAPAROSCOPIC RESECTION OF SIGMOID COLON USING DA DELANO XI N/A 9/17/2024    Procedure: XI ROBOTIC SIGMOID RESECTION, WITH ANASTAMOSIS;  Surgeon: Galileo Connors MD;  Location: [facility] OR;  Service: General;  Laterality: N/A;  possible open have instruments available     Family History       Problem Relation (Age of Onset)    Hypertension Mother          Tobacco Use    Smoking status: Never     Passive exposure: Never     Smokeless tobacco: Never   Substance and Sexual Activity    Alcohol use: Never    Drug use: Never    Sexual activity: Not Currently     Review of Systems  Objective:     Vital Signs (Most Recent):  Temp: 98.6 °F (37 °C) (02/26/25 0712)  Pulse: 87 (02/26/25 0712)  Resp: 18 (02/26/25 0712)  BP: 136/79 (02/26/25 0712)  SpO2: 96 % (02/26/25 0712) Vital Signs (24h Range):  Temp:  [97.8 °F (36.6 °C)-98.6 °F (37 °C)] 98.6 °F (37 °C)  Pulse:  [76-98] 87  Resp:  [10-19] 18  SpO2:  [90 %-96 %] 96 %  BP: (135-166)/(79-97) 136/79     Weight: 114.3 kg (252 lb)  Body mass index is 32.35 kg/m².        Physical Exam  Constitutional:       Appearance: Normal appearance.   Cardiovascular:      Rate and Rhythm: Normal rate and regular rhythm.   Pulmonary:      Effort: Pulmonary effort is normal.      Breath sounds: Normal breath sounds.   Skin:     General: Skin is warm and dry.      Comments: Left forearm fistula  Hand and fingertips are warm  No thrill in fistula   Neurological:      General: No focal deficit present.      Mental Status: He is alert and oriented to person, place, and time.   Psychiatric:      Comments: Frustrated         Significant Labs:  CMP:   Recent Labs   Lab 02/25/25  0554 02/26/25  0521   GLU 95  95 96   CALCIUM 7.2*  7.2* 6.9*   ALBUMIN 3.6  --    PROT 8.0  --      137 131*   K 4.3  4.3 4.7   CO2 21*  21* 18*   CL 94*  94* 89*   BUN 76*  76* 88*   CREATININE 15.5*  15.5* 18.3*   ALKPHOS 53  --    ALT 17  --    AST 14  --    BILITOT 0.3  --        Significant Diagnostics:  EXAMINATION:  US HEMODIALYSIS ACCESS     CLINICAL HISTORY:  No flow through fistula;     TECHNIQUE:  Sagittal and transverse images are obtained through the AV fistula in the left arm.     COMPARISON:  None     FINDINGS:  There is flow in the radial artery at 62 centimeters/second.  There is however occlusion of the AV fistula at the anastomosis and throughout the fistula itself with no flow demonstrated.  Past the AV fistula  there is flow at the antecubital fossa at 21 centimeters/second.     Impression:     Thrombosis and complete occlusion of the AV fistula in the left arm.        Electronically signed by:Everett Castellano MD  Date:                                            02/25/2025  Time:                                           14:23    Assessment/Plan:     44M with clotted left AVF. Declot in cath lab today with Dr. Khoobehi. Patient is NPO.        Active Diagnoses:    Diagnosis Date Noted POA    PRINCIPAL PROBLEM:  Colostomy status [Z93.3] 02/24/2025 Not Applicable    Anticoagulated [Z79.01] 10/25/2024 Not Applicable    Essential hypertension [I10] 07/17/2024 Yes    ESRD (end stage renal disease) [N18.6] 10/10/2022 Yes      Problems Resolved During this Admission:       Lorraine Salvador MD  Vascular Surgery  [facility]         [1]   Medications Prior to Admission   Medication Sig Dispense Refill Last Dose/Taking    cholecalciferol, vitamin D3, 125 mcg (5,000 unit) Tab Take 5,000 Units by mouth every morning.   Past Week    cloNIDine (CATAPRES) 0.3 MG tablet Take 1 tablet (0.3 mg total) by mouth 3 (three) times daily as needed (SBP > 150).   Past Week    hydrALAZINE (APRESOLINE) 100 MG tablet Take 1 tablet (100 mg total) by mouth every 8 (eight) hours. Hold for SBP < 120 and before dialysis 270 tablet 0 2/24/2025 Morning    isosorbide mononitrate (IMDUR) 120 MG 24 hr tablet Take 120 mg by mouth every morning.   2/24/2025 Morning    metoprolol succinate (TOPROL-XL) 50 MG 24 hr tablet Take 150 mg by mouth once daily.   2/24/2025 Morning    neomycin (MYCIFRADIN) 500 mg Tab On the day before surgery take 2 tablets at noon, then 2 tablets at 2pm, then 2 tablets at 10pm. 6 tablet 0 2/23/2025    olmesartan (BENICAR) 40 MG tablet Take 1 tablet (40 mg total) by mouth once daily. 90 tablet 0 2/23/2025    apixaban (ELIQUIS) 2.5 mg Tab Take 1 tablet (2.5 mg total) by mouth 2 (two) times daily.   2/20/2025    calcitRIOL  (ROCALTROL) 0.25 MCG Cap Take 1 capsule (0.25 mcg total) by mouth once daily. (Patient not taking: Reported on 1/14/2025)       calcium carbonate (TUMS) 200 mg calcium (500 mg) chewable tablet Take 2 tablets (1,000 mg total) by mouth 3 (three) times daily. (Patient not taking: Reported on 1/14/2025) 180 tablet 11     epoetin mily-epbx (RETACRIT) 20,000 unit/mL injection Inject 0.67 mLs (13,400 Units total) into the skin every Mon, Wed, Fri.       heparin sodium,porcine (HEPARIN, PORCINE,) 1,000 unit/mL injection Inject 4 mLs (4,000 Units total) into the vein as needed (line care after dialysis).       heparin sodium,porcine (HEPARIN, PORCINE,) 5,000 unit/mL injection Inject 1 mL (5,000 Units total) into the skin every 8 (eight) hours.       ketoconazole (NIZORAL) 2 % shampoo Apply topically every other day. (Patient not taking: Reported on 1/14/2025)       LIDOcaine-prilocaine (EMLA) cream Apply topically as needed (pre dialysis). 30 g 2     miconazole (MICOTIN) 2 % cream Apply topically 2 (two) times daily. (Patient not taking: Reported on 1/14/2025)       sevelamer carbonate (RENVELA) 2.4 gram PwPk Take by mouth.   2/22/2025

## 2025-02-26 NOTE — NURSING
Patient ambulated with walker, standby assist. Patient tolerated well. Reports decrease pain after ambulating.

## 2025-02-26 NOTE — ASSESSMENT & PLAN NOTE
Patient transferred to [facility] due to clotted fistula.  -attempted declotting by vascular surgery today however no anesthesia was provided the patient refused  -plan again tomorrow with anesthesia.  -restart diet and then NPO again at midnight.

## 2025-02-26 NOTE — PROGRESS NOTES
[facility] Medicine  Progress Note    Patient Name: João Ham  MRN: [id number]  Patient Class: IP- Inpatient   Admission Date: 2/24/2025  Length of Stay: 2 days  Attending Physician: Galileo Connors MD  Primary Care Provider: Dawson Granado MD        Subjective     Principal Problem:Colostomy status        HPI:  44-year-old male with a history of ESRD on hemodialysis awaiting for renal transplant, hypertension, chronic colostomy status presenting here for colostomy reversal.  On 9/2024, patient was hospitalized for inguinal hernia gangrene.  Found to have colonic perforation due to mesh erosion with an abscess.  Patient had sigmoidectomy with colostomy since then.  Now patient is presenting for a colostomy reversal, prior to this surgery patient had colonoscopy through the stoma by Dr. Connors which is unremarkable.  Patient is seen and examined in the PACU, still lethargic.  Lab work reviewed.  Patient is usually have dialysis Monday Wednesday Friday, AV fistula to the left forearm.  Patient had dialysis last Saturday with anticipation of surgery.  Patient is due dialysis tomorrow.     Overview/Hospital Course:  44-year-old male with a history of ESRD on hemodialysis awaiting for renal transplant, hypertension, chronic colostomy status presenting here for colostomy reversal. Patient had colostomy reversal uneventfully,  patient is due for dialysis however was found to have AV fistula clotted, pending [facility] evaluation.  Attempting adequate pain control, his pain is improving and belly is soft.  He had a bowel movement today.  He went for declotting of fistula by vascular surgery today but they are unable to do anesthesia so patient refused, they will plan again tomorrow with anesthesia and then we will likely need dialysis after that to get back on MWF schedule.    Interval History:   Seen and examined at multidisciplinary rounds,  pain is improving down to a 5 or 6, belly remained soft.  Patient refused declotting today because no anesthesia was provided.  Plans to try again tomorrow with anesthesia.    Review of Systems  Objective:     Vital Signs (Most Recent):  Temp: 98.3 °F (36.8 °C) (02/26/25 1624)  Pulse: 84 (02/26/25 1624)  Resp: 18 (02/26/25 1624)  BP: (!) 157/89 (02/26/25 1624)  SpO2: (!) 92 % (02/26/25 1624) Vital Signs (24h Range):  Temp:  [98.2 °F (36.8 °C)-98.6 °F (37 °C)] 98.3 °F (36.8 °C)  Pulse:  [84-98] 84  Resp:  [17-19] 18  SpO2:  [92 %-96 %] 92 %  BP: (136-166)/(79-97) 157/89     Weight: 114.3 kg (252 lb)  Body mass index is 32.35 kg/m².    Intake/Output Summary (Last 24 hours) at 2/26/2025 1626  Last data filed at 2/26/2025 1418  Gross per 24 hour   Intake 240 ml   Output 11 ml   Net 229 ml         Physical Exam  Vitals and nursing note reviewed.   Eyes:      Pupils: Pupils are equal, round, and reactive to light.   Neck:      Vascular: No JVD.   Cardiovascular:      Rate and Rhythm: Normal rate and regular rhythm.      Heart sounds: Normal heart sounds.   Pulmonary:      Effort: Pulmonary effort is normal.      Breath sounds: Normal breath sounds.   Abdominal:      General: Bowel sounds are normal. There is no distension.      Palpations: Abdomen is soft.      Tenderness: There is no abdominal tenderness.      Comments: Surgical site is dry and clean and packed.    Musculoskeletal:         General: No deformity.   Lymphadenopathy:      Cervical: No cervical adenopathy.   Skin:     Coloration: Skin is not pale.      Findings: No rash.      Comments: AV fistula to left forearm, good thrills.    Neurological:      Mental Status: He is oriented to person, place, and time. Mental status is at baseline.             Significant Labs: All pertinent labs within the past 24 hours have been reviewed.  CBC:   Recent Labs   Lab 02/25/25  0554 02/26/25  0521   WBC 10.62  10.62 11.02   HGB 11.0*  11.0* 10.3*   HCT 33.0*  33.0* 30.4*      256 224     CMP:   Recent Labs   Lab 02/25/25  0554 02/26/25  0521     137 131*   K 4.3  4.3 4.7   CL 94*  94* 89*   CO2 21*  21* 18*   GLU 95  95 96   BUN 76*  76* 88*   CREATININE 15.5*  15.5* 18.3*   CALCIUM 7.2*  7.2* 6.9*   PROT 8.0  --    ALBUMIN 3.6  --    BILITOT 0.3  --    ALKPHOS 53  --    AST 14  --    ALT 17  --    ANIONGAP 22*  22* 24*       Significant Imaging: I have reviewed all pertinent imaging results/findings within the past 24 hours.  I have reviewed and interpreted all pertinent imaging results/findings within the past 24 hours.    Scheduled Meds:   acetaminophen  1,000 mg Oral TID    apixaban  2.5 mg Oral BID    bisacodyL  5 mg Oral QHS    cholecalciferol (vitamin D3)  5,000 Units Oral QAM    epoetin mily-ebpx (RETACRIT) injection  50 Units/kg Intravenous Every Mon, Wed, Fri    gabapentin  200 mg Oral BID    hydrALAZINE  100 mg Oral Q8H    isosorbide mononitrate  120 mg Oral QAM    LIDOcaine-prilocaine   Topical (Top) Once    metoprolol succinate  150 mg Oral Daily    mupirocin   Nasal BID    sevelamer carbonate  800 mg Oral TID WM     Continuous Infusions:  PRN Meds:.  Current Facility-Administered Medications:     benzonatate, 200 mg, Oral, TID PRN    cloNIDine, 0.3 mg, Oral, TID PRN    diphenhydrAMINE, 12.5 mg, Intravenous, Q6H PRN    guaiFENesin 100 mg/5 ml, 200 mg, Oral, Q4H PRN    HYDROmorphone, 2 mg, Intravenous, Q4H PRN    lorazepam, 0.25 mg, Intravenous, Q4H PRN    naloxone, 0.02 mg, Intravenous, PRN    ondansetron, 4 mg, Intravenous, Q6H PRN    oxyCODONE, 10 mg, Oral, Q4H PRN    US Hemodialysis Access  Result Date: 2/25/2025  EXAMINATION: US HEMODIALYSIS ACCESS CLINICAL HISTORY: No flow through fistula; TECHNIQUE: Sagittal and transverse images are obtained through the AV fistula in the left arm. COMPARISON: None FINDINGS: There is flow in the radial artery at 62 centimeters/second.  There is however occlusion of the AV fistula at the anastomosis and  throughout the fistula itself with no flow demonstrated.  Past the AV fistula there is flow at the antecubital fossa at 21 centimeters/second.     Thrombosis and complete occlusion of the AV fistula in the left arm. Electronically signed by: Everett Castellano MD Date:    02/25/2025 Time:    14:23    CT Abdomen Pelvis  Without Contrast  Result Date: 1/28/2025  EXAMINATION: CT ABDOMEN PELVIS WITHOUT CONTRAST CLINICAL HISTORY: Eval prior to reversal of colostomy/ Pt ESRD Stage 4; Encounter for follow-up examination after completed treatment for conditions other than malignant neoplasm TECHNIQUE: Low dose axial images, sagittal and coronal reformations were obtained from the lung bases to the pubic symphysis. COMPARISON: CT abdomen pelvis of October 25, 2024. FINDINGS: The liver is of normal size contour and CT density for a noncontrast study.  A focal liver mass is not seen.  The gallbladder is of normal size without CT evidence of stone.  The pancreas is of normal contour and CT density without edema or mass.  The spleen is small and of normal CT density. The adrenal glands are not enlarged.  The kidneys are small.  The right kidney measures 6.7 cm in length, the left kidney measures 6.1 cm in length.  A stone is not seen.  There is a  water density 2.6 cm cyst of the midpole of the left kidney.  A stone or hydronephrosis is not seen.  The abdominal aorta and inferior vena cava are of normal caliber. The stomach is of normal configuration.  Small bowel dilatation or air-fluid levels are not seen.  The patient has had resection of the left colon with a Aidan pouch and the transverse colon leading to a colostomy in the of the left pelvic wall.  Bowel obstruction is not demonstrated.  Few diverticuli are seen in the cecum.  A colon mass is not identified.  A normal appendix is seen.  Retroperitoneal or pelvic adenopathy is not identified.  Postoperative changes are noted in the left inguinal region with a possible  small postop organizing hematoma extending  adjacent to the spermatic cord.  The bladder is of normal contour without mass or asymmetry.  The prostate is not enlarged.     Prior left colon resection with colostomy to the left pelvic wall and a Aidan's pouch.  A recurrent mass is not identified.  Postoperative changes in the left groin with a probable small organizing hematoma.  Mild diverticulosis coli in the cecum Bilateral small kidneys.  2.6 cm cyst of the left kidney Electronically signed by: Everett Castellano MD Date:    01/28/2025 Time:    11:48  - pulls last radiology orders      Assessment and Plan     * Colostomy status    Status post reversal today.   Patient had  REVISION OR CLOSURE, COLOSTOMY (N/A)  Exploratory laparotomy   Lysis of adhesions  REctosigmoid resection.   [place]won of colostomy with resection  Creation of colorectal anastomosis.     Continue postoperative medical management as per General surgery.   Close monitor.     Fistula, arteriovenous, acquired  Patient transferred to [facility] due to clotted fistula.  -attempted declotting by vascular surgery today however no anesthesia was provided the patient refused  -plan again tomorrow with anesthesia.  -restart diet and then NPO again at midnight.      Anticoagulated  Resume Eliquis if okay with General surgery.     Essential hypertension  Patient's blood pressure range in the last 24 hours was: BP  Min: 131/84  Max: 131/84.The patient's inpatient anti-hypertensive regimen is listed below:  Current Antihypertensives       Plan  - BP is controlled, no changes needed to their regimen  -     ESRD (end stage renal disease)  Creatine stable for now. BMP reviewed- noted Estimated Creatinine Clearance: 8.3 mL/min (A) (based on SCr of 15.5 mg/dL (H)). according to latest data. Based on current GFR, CKD stage is end stage.  Monitor UOP and serial BMP and adjust therapy as needed. Renally dose meds. Avoid nephrotoxic medications and procedures.    On  hemodialysis, we will consult Nephrology for dialysis support.         VTE Risk Mitigation (From admission, onward)           Ordered     apixaban tablet 2.5 mg  2 times daily         02/25/25 1112     IP VTE HIGH RISK PATIENT  Once         02/24/25 1628     Place sequential compression device  Until discontinued         02/24/25 1628                    Discharge Planning   MARIA ISABEL: 2/27/2025     Code Status: Full Code   Medical Readiness for Discharge Date:   Discharge Plan A: Home          Treatment, consult recommendation, PT/OT, dispo planning         Marichuy Crum DO  Department of Hospital Medicine   [facility]

## 2025-02-26 NOTE — PROGRESS NOTES
INPATIENT NEPHROLOGY Progress Note   [facility]    Patient Name: João Ham  Date: 02/26/2025    Reason for consultation: ESRD    Chief Complaint: Colostomy reversal    History of Present Illness:  44-year-old male with a history of ESRD on hemodialysis awaiting for renal transplant, hypertension, chronic colostomy status presenting here for colostomy reversal. On 9/2024, patient was hospitalized for inguinal hernia gangrene. Found to have colonic perforation due to mesh erosion with an abscess. Patient had sigmoidectomy with colostomy since then. Now patient is presenting for a colostomy reversal, prior to this surgery patient had colonoscopy through the stoma by Dr. Connors which is unremarkable. Patient usually has dialysis Monday Wednesday Friday, AV fistula to the left forearm. Patient had dialysis last Saturday with anticipation of surgery.     Interval History:  2/25- HD today and then resume MWF  Update- no thrill/bruit noted on AVF- get u/s of HD access now  2/26- declot today- u/s with thrombosis and complete occlusion of the AV fistula in the left arm    Plan of Care:    Assessment:  AVF clot   ESRD on HD MWF  HTN  SHPT  Anemia of CKD    Plan:    - going for declot today  - HD MWF  - continue home BP meds- UF 1-3L  - continue binders with meals, D3, calcitriol   - add LOR with HD    Patient requesting script for emla cream be sent to hospital pharmacy to be filled prior to discharge.    Update- declot rescheduled to tomorrow    Thank you for allowing us to participate in this patient's care. We will continue to follow.    Vital Signs:  Temp Readings from Last 3 Encounters:   02/26/25 98.6 °F (37 °C) (Oral)   02/17/25 98.4 °F (36.9 °C)   01/14/25 99.7 °F (37.6 °C) (Temporal)       Pulse Readings from Last 3 Encounters:   02/26/25 87   02/17/25 80   01/14/25 101       BP Readings from Last 3 Encounters:   02/26/25 136/79   02/17/25 (!) 176/94   01/14/25 (!) 96/57       Weight:  Wt  Readings from Last 3 Encounters:   02/24/25 114.3 kg (252 lb)   02/17/25 114.3 kg (252 lb)   02/13/25 114.3 kg (252 lb)     Medications:  Scheduled Meds:   acetaminophen  1,000 mg Oral TID    apixaban  2.5 mg Oral BID    bisacodyL  5 mg Oral QHS    cholecalciferol (vitamin D3)  5,000 Units Oral QAM    gabapentin  200 mg Oral BID    hydrALAZINE  100 mg Oral Q8H    isosorbide mononitrate  120 mg Oral QAM    metoprolol succinate  150 mg Oral Daily    mupirocin   Nasal BID    sevelamer carbonate  800 mg Oral TID WM     Continuous Infusions:  PRN Meds:.  Current Facility-Administered Medications:     benzonatate, 200 mg, Oral, TID PRN    cloNIDine, 0.3 mg, Oral, TID PRN    diphenhydrAMINE, 12.5 mg, Intravenous, Q6H PRN    guaiFENesin 100 mg/5 ml, 200 mg, Oral, Q4H PRN    HYDROmorphone, 2 mg, Intravenous, Q4H PRN    lorazepam, 0.25 mg, Intravenous, Q4H PRN    naloxone, 0.02 mg, Intravenous, PRN    ondansetron, 4 mg, Intravenous, Q6H PRN    oxyCODONE, 10 mg, Oral, Q4H PRN  Medications Ordered Prior to Encounter[1]    Review of Systems:  Neg    Physical Exam:  General Appearance:    NAD, AAO x 3, cooperative, appears stated age   Head:    Normocephalic, atraumatic   Eyes:    PER, EOMI, and conjunctiva/sclera clear bilaterally       Mouth:   Moist mucus membranes, no thrush or oral lesions,       normal dentition   Back:     No CVA tenderness   Lungs:     Clear to auscultation bilaterally, no wheezes, crackles,           rales or rhonchi, symmetric air movement, respirations unlabored   Chest wall:    No tenderness or deformity   Heart:    Regular rate and rhythm, S1 and S2 normal, no murmur, rub   or gallop   Abdomen:     Soft, non-tender, non-distended, bowel sounds active all four   quadrants, no RT or guarding, no masses, no organomegaly   Extremities:   Warm and well perfused, distal pulses are intact, no             cyanosis or peripheral edema   MSK:   No joint or muscle swelling, tenderness or deformity   Skin:    Skin color, texture, turgor normal, no rashes or lesions   Neurologic/Psychiatric:   CNII-XII intact, normal strength and sensation       throughout, no asterixis; normal affect, memory, judgement     and insight      Results:  Lab Results   Component Value Date     (L) 02/26/2025    K 4.7 02/26/2025    CL 89 (L) 02/26/2025    CO2 18 (L) 02/26/2025    BUN 88 (H) 02/26/2025    CREATININE 18.3 (H) 02/26/2025    CALCIUM 6.9 (LL) 02/26/2025    ANIONGAP 24 (H) 02/26/2025       Lab Results   Component Value Date    CALCIUM 6.9 (LL) 02/26/2025    PHOS 14.9 (HH) 02/26/2025       Recent Labs   Lab 02/26/25  0521   WBC 11.02   RBC 3.31*   HGB 10.3*   HCT 30.4*      MCV 92   MCH 31.1*   MCHC 33.9       I have personally reviewed pertinent radiological imaging and reports from this admission.    I have spent > 35 minutes providing care for this patient for the above diagnoses. These services have included chart/data/imaging review, evaluation, exam, formulation of plan, , note preparation, and discussions with staff involved in this patient's care.    Oni Garcia MD MPH  [facility]  [address] [place] [id number]  [phone] (p)  [phone] (f)         [1]   No current facility-administered medications on file prior to encounter.     Current Outpatient Medications on File Prior to Encounter   Medication Sig Dispense Refill    cholecalciferol, vitamin D3, 125 mcg (5,000 unit) Tab Take 5,000 Units by mouth every morning.      cloNIDine (CATAPRES) 0.3 MG tablet Take 1 tablet (0.3 mg total) by mouth 3 (three) times daily as needed (SBP > 150).      hydrALAZINE (APRESOLINE) 100 MG tablet Take 1 tablet (100 mg total) by mouth every 8 (eight) hours. Hold for SBP < 120 and before dialysis 270 tablet 0    isosorbide mononitrate (IMDUR) 120 MG 24 hr tablet Take 120 mg by mouth every morning.      metoprolol succinate (TOPROL-XL) 50 MG 24 hr tablet Take 150 mg by mouth once daily.       olmesartan (BENICAR) 40 MG tablet Take 1 tablet (40 mg total) by mouth once daily. 90 tablet 0    apixaban (ELIQUIS) 2.5 mg Tab Take 1 tablet (2.5 mg total) by mouth 2 (two) times daily.      calcitRIOL (ROCALTROL) 0.25 MCG Cap Take 1 capsule (0.25 mcg total) by mouth once daily. (Patient not taking: Reported on 1/14/2025)      calcium carbonate (TUMS) 200 mg calcium (500 mg) chewable tablet Take 2 tablets (1,000 mg total) by mouth 3 (three) times daily. (Patient not taking: Reported on 1/14/2025) 180 tablet 11    epoetin mily-epbx (RETACRIT) 20,000 unit/mL injection Inject 0.67 mLs (13,400 Units total) into the skin every Mon, Wed, Fri.      heparin sodium,porcine (HEPARIN, PORCINE,) 1,000 unit/mL injection Inject 4 mLs (4,000 Units total) into the vein as needed (line care after dialysis).      heparin sodium,porcine (HEPARIN, PORCINE,) 5,000 unit/mL injection Inject 1 mL (5,000 Units total) into the skin every 8 (eight) hours.      ketoconazole (NIZORAL) 2 % shampoo Apply topically every other day. (Patient not taking: Reported on 1/14/2025)      LIDOcaine-prilocaine (EMLA) cream Apply topically as needed (pre dialysis). 30 g 2    miconazole (MICOTIN) 2 % cream Apply topically 2 (two) times daily. (Patient not taking: Reported on 1/14/2025)      sevelamer carbonate (RENVELA) 2.4 gram PwPk Take by mouth.

## 2025-02-26 NOTE — SUBJECTIVE & OBJECTIVE
Interval History:   Seen and examined at multidisciplinary rounds, pain is improving down to a 5 or 6, belly remained soft.  Patient refused declotting today because no anesthesia was provided.  Plans to try again tomorrow with anesthesia.    Review of Systems  Objective:     Vital Signs (Most Recent):  Temp: 98.3 °F (36.8 °C) (02/26/25 1624)  Pulse: 84 (02/26/25 1624)  Resp: 18 (02/26/25 1624)  BP: (!) 157/89 (02/26/25 1624)  SpO2: (!) 92 % (02/26/25 1624) Vital Signs (24h Range):  Temp:  [98.2 °F (36.8 °C)-98.6 °F (37 °C)] 98.3 °F (36.8 °C)  Pulse:  [84-98] 84  Resp:  [17-19] 18  SpO2:  [92 %-96 %] 92 %  BP: (136-166)/(79-97) 157/89     Weight: 114.3 kg (252 lb)  Body mass index is 32.35 kg/m².    Intake/Output Summary (Last 24 hours) at 2/26/2025 1626  Last data filed at 2/26/2025 1418  Gross per 24 hour   Intake 240 ml   Output 11 ml   Net 229 ml         Physical Exam  Vitals and nursing note reviewed.   Eyes:      Pupils: Pupils are equal, round, and reactive to light.   Neck:      Vascular: No JVD.   Cardiovascular:      Rate and Rhythm: Normal rate and regular rhythm.      Heart sounds: Normal heart sounds.   Pulmonary:      Effort: Pulmonary effort is normal.      Breath sounds: Normal breath sounds.   Abdominal:      General: Bowel sounds are normal. There is no distension.      Palpations: Abdomen is soft.      Tenderness: There is no abdominal tenderness.      Comments: Surgical site is dry and clean and packed.    Musculoskeletal:         General: No deformity.   Lymphadenopathy:      Cervical: No cervical adenopathy.   Skin:     Coloration: Skin is not pale.      Findings: No rash.      Comments: AV fistula to left forearm, good thrills.    Neurological:      Mental Status: He is oriented to person, place, and time. Mental status is at baseline.             Significant Labs: All pertinent labs within the past 24 hours have been reviewed.  CBC:   Recent Labs   Lab 02/25/25  0554 02/26/25  0521   WBC  10.62  10.62 11.02   HGB 11.0*  11.0* 10.3*   HCT 33.0*  33.0* 30.4*     256 224     CMP:   Recent Labs   Lab 02/25/25  0554 02/26/25  0521     137 131*   K 4.3  4.3 4.7   CL 94*  94* 89*   CO2 21*  21* 18*   GLU 95  95 96   BUN 76*  76* 88*   CREATININE 15.5*  15.5* 18.3*   CALCIUM 7.2*  7.2* 6.9*   PROT 8.0  --    ALBUMIN 3.6  --    BILITOT 0.3  --    ALKPHOS 53  --    AST 14  --    ALT 17  --    ANIONGAP 22*  22* 24*       Significant Imaging: I have reviewed all pertinent imaging results/findings within the past 24 hours.  I have reviewed and interpreted all pertinent imaging results/findings within the past 24 hours.    Scheduled Meds:   acetaminophen  1,000 mg Oral TID    apixaban  2.5 mg Oral BID    bisacodyL  5 mg Oral QHS    cholecalciferol (vitamin D3)  5,000 Units Oral QAM    epoetin mily-ebpx (RETACRIT) injection  50 Units/kg Intravenous Every Mon, Wed, Fri    gabapentin  200 mg Oral BID    hydrALAZINE  100 mg Oral Q8H    isosorbide mononitrate  120 mg Oral QAM    LIDOcaine-prilocaine   Topical (Top) Once    metoprolol succinate  150 mg Oral Daily    mupirocin   Nasal BID    sevelamer carbonate  800 mg Oral TID WM     Continuous Infusions:  PRN Meds:.  Current Facility-Administered Medications:     benzonatate, 200 mg, Oral, TID PRN    cloNIDine, 0.3 mg, Oral, TID PRN    diphenhydrAMINE, 12.5 mg, Intravenous, Q6H PRN    guaiFENesin 100 mg/5 ml, 200 mg, Oral, Q4H PRN    HYDROmorphone, 2 mg, Intravenous, Q4H PRN    lorazepam, 0.25 mg, Intravenous, Q4H PRN    naloxone, 0.02 mg, Intravenous, PRN    ondansetron, 4 mg, Intravenous, Q6H PRN    oxyCODONE, 10 mg, Oral, Q4H PRN    US Hemodialysis Access  Result Date: 2/25/2025  EXAMINATION: US HEMODIALYSIS ACCESS CLINICAL HISTORY: No flow through fistula; TECHNIQUE: Sagittal and transverse images are obtained through the AV fistula in the left arm. COMPARISON: None FINDINGS: There is flow in the radial artery at 62 centimeters/second.   There is however occlusion of the AV fistula at the anastomosis and throughout the fistula itself with no flow demonstrated.  Past the AV fistula there is flow at the antecubital fossa at 21 centimeters/second.     Thrombosis and complete occlusion of the AV fistula in the left arm. Electronically signed by: Everett Castellano MD Date:    02/25/2025 Time:    14:23    CT Abdomen Pelvis  Without Contrast  Result Date: 1/28/2025  EXAMINATION: CT ABDOMEN PELVIS WITHOUT CONTRAST CLINICAL HISTORY: Eval prior to reversal of colostomy/ Pt ESRD Stage 4; Encounter for follow-up examination after completed treatment for conditions other than malignant neoplasm TECHNIQUE: Low dose axial images, sagittal and coronal reformations were obtained from the lung bases to the pubic symphysis. COMPARISON: CT abdomen pelvis of October 25, 2024. FINDINGS: The liver is of normal size contour and CT density for a noncontrast study.  A focal liver mass is not seen.  The gallbladder is of normal size without CT evidence of stone.  The pancreas is of normal contour and CT density without edema or mass.  The spleen is small and of normal CT density. The adrenal glands are not enlarged.  The kidneys are small.  The right kidney measures 6.7 cm in length, the left kidney measures 6.1 cm in length.  A stone is not seen.  There is a  water density 2.6 cm cyst of the midpole of the left kidney.  A stone or hydronephrosis is not seen.  The abdominal aorta and inferior vena cava are of normal caliber. The stomach is of normal configuration.  Small bowel dilatation or air-fluid levels are not seen.  The patient has had resection of the left colon with a Aidan pouch and the transverse colon leading to a colostomy in the of the left pelvic wall.  Bowel obstruction is not demonstrated.  Few diverticuli are seen in the cecum.  A colon mass is not identified.  A normal appendix is seen.  Retroperitoneal or pelvic adenopathy is not identified.   Postoperative changes are noted in the left inguinal region with a possible small postop organizing hematoma extending  adjacent to the spermatic cord.  The bladder is of normal contour without mass or asymmetry.  The prostate is not enlarged.     Prior left colon resection with colostomy to the left pelvic wall and a Aidan's pouch.  A recurrent mass is not identified.  Postoperative changes in the left groin with a probable small organizing hematoma.  Mild diverticulosis coli in the cecum Bilateral small kidneys.  2.6 cm cyst of the left kidney Electronically signed by: Everett Castellano MD Date:    01/28/2025 Time:    11:48  - pulls last radiology orders

## 2025-02-26 NOTE — NURSING
Patient's reports ineffective pain control despite pain management plan. Patient reports 10/10 pain in the abdominal area. Surgical dressing clean, dry and intact. BERNABE drain remains in place. Bulb remains compressed. Vitals WNL. Dr. Justice surgeon on call notified. Per MD will access patient's chart.

## 2025-02-26 NOTE — PROGRESS NOTES
Pt seen and examined.  REsting comfortably.  Had 2 BMs today.  Denies nausea.    L fistula has clotted by US.  He will have declot procedure tomorrow.      Wt Readings from Last 3 Encounters:   02/24/25 114.3 kg (252 lb)   02/17/25 114.3 kg (252 lb)   02/13/25 114.3 kg (252 lb)     Temp Readings from Last 3 Encounters:   02/26/25 98.6 °F (37 °C) (Oral)   02/17/25 98.4 °F (36.9 °C)   01/14/25 99.7 °F (37.6 °C) (Temporal)     BP Readings from Last 3 Encounters:   02/26/25 136/79   02/17/25 (!) 176/94   01/14/25 (!) 96/57     Pulse Readings from Last 3 Encounters:   02/26/25 87   02/17/25 80   01/14/25 101     AAOx3  Sinus  Soft/nd/nt BS present  Inc : c/d/I    A/P: s/p colostomy reversal  Ambulate  Aggressive IS  Adv diet as tolerated.    To have declot tomorrow.

## 2025-02-26 NOTE — RESPIRATORY THERAPY
"   02/25/25 1906   Patient Assessment/Suction   Level of Consciousness (AVPU) alert  (patient in pain, rn aware)   Respiratory Effort Normal;Unlabored   Expansion/Accessory Muscles/Retractions no retractions;no use of accessory muscles   All Lung Fields Breath Sounds Anterior:;Posterior:;Lateral:;equal bilaterally;clear   Rhythm/Pattern, Respiratory depth regular;pattern regular;unlabored   Cough Frequency no cough   PRE-TX-O2   Device (Oxygen Therapy) room air   SpO2 (!) 92 %   Pulse Oximetry Type Intermittent   $ Pulse Oximetry - Multiple Charge Pulse Oximetry - Multiple   Pulse 87   Resp 17   Incentive Spirometer   $ Incentive Spirometer Charges done with encouragement;done independently per patient;ready for self-administration;proper technique demonstrated   Incentive Spirometer Predicted Level (mL) 2680   Administration (IS) instruction provided, initial  (order carry over from another facility)   Number of Repetitions (IS) 8   Level Incentive Spirometer (mL) 1700   Patient Tolerance (IS) no adverse signs/symptoms present;good   Equipment Change   $ RT Equipment incentive spirometer   Tobacco Cessation Intervention   Do you use any type of tobacco product? No   Respiratory Evaluation   $ Care Plan Tech Time 15 min   $ Respiratory Evaluation Complete   Evaluation For Transfer  (from another facility)   Admitting Diagnosis colostomy status   Cardiac Diagnosis htn   Pulmonary Diagnosis oxa   Current Surgeries colostomy closure   Home Oxygen   Has Home Oxygen? No   Home Aerosol, MDI, DPI, and Other Treatments/Therapies   Home Respiratory Therapy Per Patient/Review of Chart Yes   Other Home Respiratory Therapies Bipap Qhs "does not wear"   Oxygen Care Plan   Oxygen Care Plan Per Protocol   SPO2 Goal (%) 92% non-cardiac   Rationale Post-op recovery   Bronchodilator Care Plan   Bronchodilator Care Plan N/A   Rationale No Rationale found   Atelectasis Care Plan   Atelectasis Care Plan Incentive Spiromentry   Frequency " TID   I.S. Goal (ml) 2640 ml   I.S. Minimum (ml) 1320 ml   Rationale Post-op abdominal   Other Atelectasis s/p colostomy closure   Airway Clearance Care Plan   Rationale No rationale found

## 2025-02-26 NOTE — ANESTHESIA POSTPROCEDURE EVALUATION
Anesthesia Post Evaluation    Patient: João Ham    Procedure(s) Performed: Procedure(s) (LRB):  REVISION OR CLOSURE, COLOSTOMY (N/A)    Final Anesthesia Type: general      Patient location during evaluation: PACU  Patient participation: Yes- Able to Participate  Level of consciousness: awake and alert and oriented  Post-procedure vital signs: reviewed and stable  Pain management: adequate  Airway patency: patent  CLOVIS mitigation strategies: Multimodal analgesia, Extubation while patient is awake, Verification of full reversal of neuromuscular block and Extubation and recovery carried out in lateral, semiupright, or other nonsupine position  PONV status at discharge: No PONV  Anesthetic complications: no      Cardiovascular status: blood pressure returned to baseline  Respiratory status: unassisted, spontaneous ventilation and room air  Hydration status: euvolemic  Follow-up not needed.              Vitals Value Taken Time   /79 02/26/25 07:12   Temp 37 °C (98.6 °F) 02/26/25 07:12   Pulse 87 02/26/25 07:12   Resp 18 02/26/25 13:14   SpO2 96 % 02/26/25 13:14         Event Time   Out of Recovery 16:10:00         Pain/Evans Score: Pain Rating Prior to Med Admin: 10 (2/26/2025 12:06 PM)  Pain Rating Post Med Admin: 4 (2/26/2025 10:14 AM)

## 2025-02-26 NOTE — ANESTHESIA PREPROCEDURE EVALUATION
02/27/2025  João Ham is a 44 y.o., male.             Results for orders placed or performed during the hospital encounter of 02/24/25   EKG 12-lead    Collection Time: 02/27/25  7:21 AM   Result Value Ref Range    QRS Duration 102 ms    OHS QTC Calculation 510 ms    Narrative    Test Reason : Z01.810,    Vent. Rate :  96 BPM     Atrial Rate :  96 BPM     P-R Int : 162 ms          QRS Dur : 102 ms      QT Int : 404 ms       P-R-T Axes :  44 -72  16 degrees    QTcB Int : 510 ms    Normal sinus rhythm  Left anterior fascicular block  Moderate voltage criteria for LVH, may be normal variant ( R in aVL ,  [place] product )  Anterolateral infarct ,age undetermined  Prolonged QT  Abnormal ECG  When compared with ECG of 29-Oct-2024 08:37,  Anterior infarct is now Present  Anterolateral infarct is now Present    Referred By: AAAREFERRAL SELF           Confirmed By:              Lab Results   Component Value Date    WBC 10.61 02/27/2025    HGB 9.8 (L) 02/27/2025    HCT 29.7 (L) 02/27/2025    MCV 92 02/27/2025     02/27/2025     BMP  Lab Results   Component Value Date     (L) 02/26/2025    K 4.7 02/26/2025    CL 89 (L) 02/26/2025    CO2 18 (L) 02/26/2025    BUN 88 (H) 02/26/2025    CREATININE 18.3 (H) 02/26/2025    CALCIUM 6.9 (LL) 02/26/2025    ANIONGAP 24 (H) 02/26/2025    GLU 96 02/26/2025    GLU 95 02/25/2025    GLU 95 02/25/2025       Results for orders placed during the hospital encounter of 07/15/24    Echo    Interpretation Summary    Limited echo only for EF and to look at valves due to bacteremia.    Left Ventricle: The left ventricle is normal in size. Increased wall thickness. There is concentric remodeling. There is normal systolic function with a visually estimated ejection fraction of 55 - 60%.    Aortic Valve: There is no mass/vegetation present.    Mitral Valve: There is no  mass/vegetation present.    Tricuspid Valve: There is no mass/vegetation present.    Pulmonic Valve: There is no mass/vegetation present.               Pre-op Assessment    I have reviewed the Patient Summary Reports.     I have reviewed the Nursing Notes. I have reviewed the NPO Status.   I have reviewed the Medications.     Review of Systems  Anesthesia Hx:  No problems with previous Anesthesia             Denies Family Hx of Anesthesia complications.    Denies Personal Hx of Anesthesia complications.                    Social:  Non-Smoker, No Alcohol Use       Hematology/Oncology:    Oncology Normal    -- Anemia:               Hematology Comments: Eliquis therapy                    EENT/Dental:  EENT/Dental Normal           Cardiovascular:     Hypertension, well controlled       CHF (EF 55-60%)       ECG has been reviewed.  Patient on beta blockers Beta blocker taken in last 24 hours                       Other Cardiac Conditions, Pulmonary Hypertension   Pulmonary:        Sleep Apnea (on BiPAP currently), CPAP History of acute hypoxic respiratory failure  Oxygen saturation 93% to 96%          Education provided regarding risk of obstructive sleep apnea            Renal/:  Chronic Renal Disease (MWF dialysis, last dialysis on 9/24/24), ESRD        Kidney Function/Disease, Chronic Kidney Disease (CKD) , CKD Stage V (ESRD) (GFR <15 or on Dialysis)         Dialysis Dependent, M-W-F     Hepatic/GI:  Hepatic/GI Normal                    Musculoskeletal:  Musculoskeletal Normal                Neurological:    Neuromuscular Disease,                                   Endocrine:        Obesity / BMI > 30  Psych:  Psychiatric History  depression Sleep Disorder and Insomnia.       Sleep Disorder and Insomnia.        Physical Exam  General: Well nourished, Cooperative, Alert and Oriented    Airway:  Mallampati: III   Mouth Opening: Normal  TM Distance: Normal  Tongue: Normal  Neck ROM: Normal  ROM    Dental:    Chest/Lungs:  Clear to auscultation    Heart:  Rate: Normal  Rhythm: Regular Rhythm  Sounds: Normal    Abdomen:  Normal, Soft, Nontender        Anesthesia Plan  Type of Anesthesia, risks & benefits discussed:    Anesthesia Type: Gen Supraglottic Airway, Gen ETT  Intra-op Monitoring Plan: Standard ASA Monitors  Post Op Pain Control Plan: multimodal analgesia and IV/PO Opioids PRN  Induction:  IV  ASA Score: 3  Anesthesia Plan Notes:     Check EKG  Check morning labs  Zofran Pepcid  No steroids please    Ready For Surgery From Anesthesia Perspective.     .

## 2025-02-27 LAB
ANION GAP SERPL CALC-SCNC: 27 MMOL/L (ref 8–16)
BASOPHILS # BLD AUTO: 0.01 K/UL (ref 0–0.2)
BASOPHILS NFR BLD: 0.1 % (ref 0–1.9)
BUN SERPL-MCNC: 96 MG/DL (ref 6–20)
CALCIUM SERPL-MCNC: 7.4 MG/DL (ref 8.7–10.5)
CHLORIDE SERPL-SCNC: 87 MMOL/L (ref 95–110)
CO2 SERPL-SCNC: 22 MMOL/L (ref 23–29)
CREAT SERPL-MCNC: 20.6 MG/DL (ref 0.5–1.4)
DIFFERENTIAL METHOD BLD: ABNORMAL
EOSINOPHIL # BLD AUTO: 0.1 K/UL (ref 0–0.5)
EOSINOPHIL NFR BLD: 1.3 % (ref 0–8)
ERYTHROCYTE [DISTWIDTH] IN BLOOD BY AUTOMATED COUNT: 15.8 % (ref 11.5–14.5)
EST. GFR  (NO RACE VARIABLE): 2.5 ML/MIN/1.73 M^2
GLUCOSE SERPL-MCNC: 94 MG/DL (ref 70–110)
HCT VFR BLD AUTO: 29.7 % (ref 40–54)
HGB BLD-MCNC: 9.8 G/DL (ref 14–18)
IMM GRANULOCYTES # BLD AUTO: 0.04 K/UL (ref 0–0.04)
IMM GRANULOCYTES NFR BLD AUTO: 0.4 % (ref 0–0.5)
LYMPHOCYTES # BLD AUTO: 0.9 K/UL (ref 1–4.8)
LYMPHOCYTES NFR BLD: 8.9 % (ref 18–48)
MCH RBC QN AUTO: 30.4 PG (ref 27–31)
MCHC RBC AUTO-ENTMCNC: 33 G/DL (ref 32–36)
MCV RBC AUTO: 92 FL (ref 82–98)
MONOCYTES # BLD AUTO: 1.2 K/UL (ref 0.3–1)
MONOCYTES NFR BLD: 11.5 % (ref 4–15)
NEUTROPHILS # BLD AUTO: 8.3 K/UL (ref 1.8–7.7)
NEUTROPHILS NFR BLD: 77.8 % (ref 38–73)
NRBC BLD-RTO: 0 /100 WBC
PHOSPHATE SERPL-MCNC: 15.6 MG/DL (ref 2.7–4.5)
PLATELET # BLD AUTO: 232 K/UL (ref 150–450)
PMV BLD AUTO: 9.8 FL (ref 9.2–12.9)
POTASSIUM SERPL-SCNC: 4.5 MMOL/L (ref 3.5–5.1)
RBC # BLD AUTO: 3.22 M/UL (ref 4.6–6.2)
SODIUM SERPL-SCNC: 136 MMOL/L (ref 136–145)
WBC # BLD AUTO: 10.61 K/UL (ref 3.9–12.7)

## 2025-02-27 PROCEDURE — 25000003 PHARM REV CODE 250: Performed by: INTERNAL MEDICINE

## 2025-02-27 PROCEDURE — 63600175 PHARM REV CODE 636 W HCPCS: Performed by: NURSE ANESTHETIST, CERTIFIED REGISTERED

## 2025-02-27 PROCEDURE — 63600175 PHARM REV CODE 636 W HCPCS: Performed by: SURGERY

## 2025-02-27 PROCEDURE — 90935 HEMODIALYSIS ONE EVALUATION: CPT

## 2025-02-27 PROCEDURE — 63600175 PHARM REV CODE 636 W HCPCS: Mod: TB,JZ | Performed by: NURSE PRACTITIONER

## 2025-02-27 PROCEDURE — 25000003 PHARM REV CODE 250: Performed by: HOSPITALIST

## 2025-02-27 PROCEDURE — 63600175 PHARM REV CODE 636 W HCPCS: Mod: TB,JZ | Performed by: FAMILY MEDICINE

## 2025-02-27 PROCEDURE — 86706 HEP B SURFACE ANTIBODY: CPT | Performed by: SURGERY

## 2025-02-27 PROCEDURE — B51WYZZ FLUOROSCOPY OF DIALYSIS SHUNT/FISTULA USING OTHER CONTRAST: ICD-10-PCS | Performed by: SURGERY

## 2025-02-27 PROCEDURE — 03CY0ZZ EXTIRPATION OF MATTER FROM UPPER ARTERY, OPEN APPROACH: ICD-10-PCS | Performed by: SURGERY

## 2025-02-27 PROCEDURE — C1769 GUIDE WIRE: HCPCS | Performed by: SURGERY

## 2025-02-27 PROCEDURE — C1757 CATH, THROMBECTOMY/EMBOLECT: HCPCS | Performed by: SURGERY

## 2025-02-27 PROCEDURE — 057Y0ZZ DILATION OF UPPER VEIN, OPEN APPROACH: ICD-10-PCS | Performed by: SURGERY

## 2025-02-27 PROCEDURE — 37000009 HC ANESTHESIA EA ADD 15 MINS: Performed by: SURGERY

## 2025-02-27 PROCEDURE — 80048 BASIC METABOLIC PNL TOTAL CA: CPT | Performed by: HOSPITALIST

## 2025-02-27 PROCEDURE — 03780ZZ DILATION OF LEFT BRACHIAL ARTERY, OPEN APPROACH: ICD-10-PCS | Performed by: SURGERY

## 2025-02-27 PROCEDURE — C1725 CATH, TRANSLUMIN NON-LASER: HCPCS | Performed by: SURGERY

## 2025-02-27 PROCEDURE — 27000221 HC OXYGEN, UP TO 24 HOURS

## 2025-02-27 PROCEDURE — 87340 HEPATITIS B SURFACE AG IA: CPT | Performed by: SURGERY

## 2025-02-27 PROCEDURE — 93010 ELECTROCARDIOGRAM REPORT: CPT | Mod: ,,, | Performed by: INTERNAL MEDICINE

## 2025-02-27 PROCEDURE — C1887 CATHETER, GUIDING: HCPCS | Performed by: SURGERY

## 2025-02-27 PROCEDURE — 25500020 PHARM REV CODE 255: Performed by: SURGERY

## 2025-02-27 PROCEDURE — 05CA0ZZ EXTIRPATION OF MATTER FROM LEFT BRACHIAL VEIN, OPEN APPROACH: ICD-10-PCS | Performed by: SURGERY

## 2025-02-27 PROCEDURE — 25000003 PHARM REV CODE 250: Performed by: NURSE ANESTHETIST, CERTIFIED REGISTERED

## 2025-02-27 PROCEDURE — 36415 COLL VENOUS BLD VENIPUNCTURE: CPT | Performed by: HOSPITALIST

## 2025-02-27 PROCEDURE — 37000008 HC ANESTHESIA 1ST 15 MINUTES: Performed by: SURGERY

## 2025-02-27 PROCEDURE — 5A1D70Z PERFORMANCE OF URINARY FILTRATION, INTERMITTENT, LESS THAN 6 HOURS PER DAY: ICD-10-PCS | Performed by: SURGERY

## 2025-02-27 PROCEDURE — C1894 INTRO/SHEATH, NON-LASER: HCPCS | Performed by: SURGERY

## 2025-02-27 PROCEDURE — 25000003 PHARM REV CODE 250: Performed by: FAMILY MEDICINE

## 2025-02-27 PROCEDURE — 94761 N-INVAS EAR/PLS OXIMETRY MLT: CPT

## 2025-02-27 PROCEDURE — 85025 COMPLETE CBC W/AUTO DIFF WBC: CPT | Performed by: HOSPITALIST

## 2025-02-27 PROCEDURE — 94799 UNLISTED PULMONARY SVC/PX: CPT

## 2025-02-27 PROCEDURE — 36415 COLL VENOUS BLD VENIPUNCTURE: CPT | Performed by: SURGERY

## 2025-02-27 PROCEDURE — 99900031 HC PATIENT EDUCATION (STAT)

## 2025-02-27 PROCEDURE — 12000002 HC ACUTE/MED SURGE SEMI-PRIVATE ROOM

## 2025-02-27 PROCEDURE — 93005 ELECTROCARDIOGRAM TRACING: CPT | Performed by: INTERNAL MEDICINE

## 2025-02-27 PROCEDURE — 27201423 OPTIME MED/SURG SUP & DEVICES STERILE SUPPLY: Performed by: SURGERY

## 2025-02-27 PROCEDURE — 25000003 PHARM REV CODE 250: Performed by: SURGERY

## 2025-02-27 PROCEDURE — 84100 ASSAY OF PHOSPHORUS: CPT | Performed by: HOSPITALIST

## 2025-02-27 RX ORDER — ROCURONIUM BROMIDE 10 MG/ML
INJECTION, SOLUTION INTRAVENOUS
Status: DISCONTINUED | OUTPATIENT
Start: 2025-02-27 | End: 2025-02-27

## 2025-02-27 RX ORDER — FAMOTIDINE 10 MG/ML
INJECTION INTRAVENOUS
Status: DISCONTINUED | OUTPATIENT
Start: 2025-02-27 | End: 2025-02-27

## 2025-02-27 RX ORDER — LIDOCAINE HYDROCHLORIDE 20 MG/ML
JELLY TOPICAL
Status: DISCONTINUED | OUTPATIENT
Start: 2025-02-27 | End: 2025-02-27

## 2025-02-27 RX ORDER — IODIXANOL 320 MG/ML
INJECTION, SOLUTION INTRAVASCULAR
Status: DISCONTINUED | OUTPATIENT
Start: 2025-02-27 | End: 2025-02-27 | Stop reason: HOSPADM

## 2025-02-27 RX ORDER — ONDANSETRON HYDROCHLORIDE 2 MG/ML
4 INJECTION, SOLUTION INTRAVENOUS DAILY PRN
Status: DISCONTINUED | OUTPATIENT
Start: 2025-02-27 | End: 2025-02-27 | Stop reason: HOSPADM

## 2025-02-27 RX ORDER — HYDROMORPHONE HYDROCHLORIDE 1 MG/ML
1 INJECTION, SOLUTION INTRAMUSCULAR; INTRAVENOUS; SUBCUTANEOUS ONCE
Status: COMPLETED | OUTPATIENT
Start: 2025-02-27 | End: 2025-02-27

## 2025-02-27 RX ORDER — HEPARIN SODIUM 10000 [USP'U]/ML
INJECTION, SOLUTION INTRAVENOUS; SUBCUTANEOUS
Status: DISCONTINUED | OUTPATIENT
Start: 2025-02-27 | End: 2025-02-27

## 2025-02-27 RX ORDER — FENTANYL CITRATE 50 UG/ML
INJECTION, SOLUTION INTRAMUSCULAR; INTRAVENOUS
Status: DISCONTINUED | OUTPATIENT
Start: 2025-02-27 | End: 2025-02-27

## 2025-02-27 RX ORDER — PHENYLEPHRINE HYDROCHLORIDE 10 MG/ML
INJECTION INTRAVENOUS
Status: DISCONTINUED | OUTPATIENT
Start: 2025-02-27 | End: 2025-02-27

## 2025-02-27 RX ORDER — DIPHENHYDRAMINE HYDROCHLORIDE 50 MG/ML
12.5 INJECTION, SOLUTION INTRAMUSCULAR; INTRAVENOUS ONCE AS NEEDED
Status: DISCONTINUED | OUTPATIENT
Start: 2025-02-27 | End: 2025-02-27 | Stop reason: HOSPADM

## 2025-02-27 RX ORDER — OXYCODONE HYDROCHLORIDE 5 MG/1
5 TABLET ORAL
Status: DISCONTINUED | OUTPATIENT
Start: 2025-02-27 | End: 2025-02-27 | Stop reason: HOSPADM

## 2025-02-27 RX ORDER — GLUCAGON 1 MG
1 KIT INJECTION
Status: DISCONTINUED | OUTPATIENT
Start: 2025-02-27 | End: 2025-02-27 | Stop reason: HOSPADM

## 2025-02-27 RX ORDER — LIDOCAINE HYDROCHLORIDE 20 MG/ML
INJECTION INTRAVENOUS
Status: DISCONTINUED | OUTPATIENT
Start: 2025-02-27 | End: 2025-02-27

## 2025-02-27 RX ORDER — PROPOFOL 10 MG/ML
VIAL (ML) INTRAVENOUS
Status: DISCONTINUED | OUTPATIENT
Start: 2025-02-27 | End: 2025-02-27

## 2025-02-27 RX ORDER — SUCCINYLCHOLINE CHLORIDE 20 MG/ML
INJECTION INTRAMUSCULAR; INTRAVENOUS
Status: DISCONTINUED | OUTPATIENT
Start: 2025-02-27 | End: 2025-02-27

## 2025-02-27 RX ORDER — FENTANYL CITRATE 50 UG/ML
25 INJECTION, SOLUTION INTRAMUSCULAR; INTRAVENOUS EVERY 5 MIN PRN
Status: DISCONTINUED | OUTPATIENT
Start: 2025-02-27 | End: 2025-02-27 | Stop reason: HOSPADM

## 2025-02-27 RX ORDER — MIDAZOLAM HYDROCHLORIDE 1 MG/ML
INJECTION INTRAMUSCULAR; INTRAVENOUS
Status: DISCONTINUED | OUTPATIENT
Start: 2025-02-27 | End: 2025-02-27

## 2025-02-27 RX ORDER — LIDOCAINE AND PRILOCAINE 25; 25 MG/G; MG/G
CREAM TOPICAL ONCE
Status: COMPLETED | OUTPATIENT
Start: 2025-02-27 | End: 2025-02-27

## 2025-02-27 RX ORDER — ONDANSETRON HYDROCHLORIDE 2 MG/ML
INJECTION, SOLUTION INTRAVENOUS
Status: DISCONTINUED | OUTPATIENT
Start: 2025-02-27 | End: 2025-02-27

## 2025-02-27 RX ORDER — LIDOCAINE HYDROCHLORIDE 10 MG/ML
INJECTION, SOLUTION EPIDURAL; INFILTRATION; INTRACAUDAL; PERINEURAL
Status: DISCONTINUED | OUTPATIENT
Start: 2025-02-27 | End: 2025-02-27 | Stop reason: HOSPADM

## 2025-02-27 RX ADMIN — HYDROMORPHONE HYDROCHLORIDE 2 MG: 2 INJECTION INTRAMUSCULAR; INTRAVENOUS; SUBCUTANEOUS at 05:02

## 2025-02-27 RX ADMIN — ONDANSETRON 4 MG: 2 INJECTION INTRAMUSCULAR; INTRAVENOUS at 09:02

## 2025-02-27 RX ADMIN — Medication 140 MG: at 09:02

## 2025-02-27 RX ADMIN — ONDANSETRON 4 MG: 2 INJECTION INTRAMUSCULAR; INTRAVENOUS at 01:02

## 2025-02-27 RX ADMIN — ISOSORBIDE MONONITRATE 120 MG: 60 TABLET, EXTENDED RELEASE ORAL at 05:02

## 2025-02-27 RX ADMIN — HYDROMORPHONE HYDROCHLORIDE 2 MG: 2 INJECTION INTRAMUSCULAR; INTRAVENOUS; SUBCUTANEOUS at 01:02

## 2025-02-27 RX ADMIN — GABAPENTIN 200 MG: 100 CAPSULE ORAL at 08:02

## 2025-02-27 RX ADMIN — HYDROMORPHONE HYDROCHLORIDE 1 MG: 1 INJECTION, SOLUTION INTRAMUSCULAR; INTRAVENOUS; SUBCUTANEOUS at 01:02

## 2025-02-27 RX ADMIN — PROPOFOL 150 MG: 10 INJECTION, EMULSION INTRAVENOUS at 09:02

## 2025-02-27 RX ADMIN — PHENYLEPHRINE HYDROCHLORIDE 100 MCG: 10 INJECTION INTRAVENOUS at 09:02

## 2025-02-27 RX ADMIN — ROCURONIUM BROMIDE 10 MG: 10 INJECTION, SOLUTION INTRAVENOUS at 09:02

## 2025-02-27 RX ADMIN — MIDAZOLAM HYDROCHLORIDE 2 MG: 1 INJECTION, SOLUTION INTRAMUSCULAR; INTRAVENOUS at 09:02

## 2025-02-27 RX ADMIN — PHENYLEPHRINE HYDROCHLORIDE 200 MCG: 10 INJECTION INTRAVENOUS at 09:02

## 2025-02-27 RX ADMIN — PHENYLEPHRINE HYDROCHLORIDE 200 MCG: 10 INJECTION INTRAVENOUS at 10:02

## 2025-02-27 RX ADMIN — MUPIROCIN 1 G: 20 OINTMENT TOPICAL at 08:02

## 2025-02-27 RX ADMIN — ACETAMINOPHEN 1000 MG: 500 TABLET, FILM COATED ORAL at 08:02

## 2025-02-27 RX ADMIN — LIDOCAINE AND PRILOCAINE: 25; 25 CREAM TOPICAL at 12:02

## 2025-02-27 RX ADMIN — FENTANYL CITRATE 100 MCG: 50 INJECTION, SOLUTION INTRAMUSCULAR; INTRAVENOUS at 09:02

## 2025-02-27 RX ADMIN — HYDRALAZINE HYDROCHLORIDE 100 MG: 25 TABLET, FILM COATED ORAL at 10:02

## 2025-02-27 RX ADMIN — BISACODYL 5 MG: 5 TABLET, COATED ORAL at 08:02

## 2025-02-27 RX ADMIN — SODIUM CHLORIDE: 0.9 INJECTION, SOLUTION INTRAVENOUS at 09:02

## 2025-02-27 RX ADMIN — CHOLECALCIFEROL TAB 125 MCG (5000 UNIT) 5000 UNITS: 125 TAB at 05:02

## 2025-02-27 RX ADMIN — CLONIDINE HYDROCHLORIDE 0.3 MG: 0.1 TABLET ORAL at 05:02

## 2025-02-27 RX ADMIN — LIDOCAINE HYDROCHLORIDE 3 ML: 20 JELLY TOPICAL at 09:02

## 2025-02-27 RX ADMIN — HYDROMORPHONE HYDROCHLORIDE 2 MG: 2 INJECTION INTRAMUSCULAR; INTRAVENOUS; SUBCUTANEOUS at 10:02

## 2025-02-27 RX ADMIN — HYDRALAZINE HYDROCHLORIDE 100 MG: 25 TABLET, FILM COATED ORAL at 05:02

## 2025-02-27 RX ADMIN — HEPARIN SODIUM 7000 UNITS: 10000 INJECTION, SOLUTION INTRAVENOUS; SUBCUTANEOUS at 10:02

## 2025-02-27 RX ADMIN — APIXABAN 2.5 MG: 2.5 TABLET, FILM COATED ORAL at 08:02

## 2025-02-27 RX ADMIN — CLONIDINE HYDROCHLORIDE 0.3 MG: 0.1 TABLET ORAL at 08:02

## 2025-02-27 RX ADMIN — LIDOCAINE HYDROCHLORIDE 60 MG: 20 INJECTION, SOLUTION INTRAVENOUS at 09:02

## 2025-02-27 RX ADMIN — FAMOTIDINE 20 MG: 10 INJECTION, SOLUTION INTRAVENOUS at 09:02

## 2025-02-27 NOTE — PROGRESS NOTES
Patient seen and examined.  Resting comfortably in bed.  Has had bowel function.  Denies nausea or vomiting.  Tolerating diet     Patient had declot procedure today.  Plans to undergo dialysis later today.    Wt Readings from Last 3 Encounters:   02/24/25 114.3 kg (252 lb)   02/17/25 114.3 kg (252 lb)   02/13/25 114.3 kg (252 lb)     Temp Readings from Last 3 Encounters:   02/27/25 97.2 °F (36.2 °C) (Temporal)   02/17/25 98.4 °F (36.9 °C)   01/14/25 99.7 °F (37.6 °C) (Temporal)     BP Readings from Last 3 Encounters:   02/27/25 (!) 152/92   02/17/25 (!) 176/94   01/14/25 (!) 96/57     Pulse Readings from Last 3 Encounters:   02/27/25 85   02/17/25 80   01/14/25 101     AAOx3  Soft/nd/nt  No resp distress    BERNABE serosang    Lab Results   Component Value Date    WBC 10.61 02/27/2025    HGB 9.8 (L) 02/27/2025    HCT 29.7 (L) 02/27/2025    MCV 92 02/27/2025     02/27/2025       BMP  Lab Results   Component Value Date     02/27/2025    K 4.5 02/27/2025    CL 87 (L) 02/27/2025    CO2 22 (L) 02/27/2025    BUN 96 (H) 02/27/2025    CREATININE 20.6 (H) 02/27/2025    CALCIUM 7.4 (L) 02/27/2025    ANIONGAP 27 (H) 02/27/2025    EGFRNORACEVR 2.5 (A) 02/27/2025     A/P:  Status post colostomy reversal     Doing well.  We will advance diet.  Okay to DC when cleared medically.  We will DC with BERNABE drain in place

## 2025-02-27 NOTE — RESPIRATORY THERAPY
02/27/25 0719   Patient Assessment/Suction   Level of Consciousness (AVPU) alert   Respiratory Effort Normal;Unlabored   Expansion/Accessory Muscles/Retractions no use of accessory muscles;no retractions;expansion symmetric   Rhythm/Pattern, Respiratory unlabored;shallow   Cough Frequency no cough   PRE-TX-O2   Device (Oxygen Therapy) nasal cannula   $ Is the patient on Low Flow Oxygen? Yes   Flow (L/min) (Oxygen Therapy) 2   Pulse Oximetry Type Intermittent   $ Pulse Oximetry - Multiple Charge Pulse Oximetry - Multiple   Pulse 98

## 2025-02-27 NOTE — ASSESSMENT & PLAN NOTE
Patient transferred to [facility] due to clotted fistula.  -patient for fistula declot and then dialysis after that  -nephrology following

## 2025-02-27 NOTE — CARE UPDATE
02/26/25 1959   Patient Assessment/Suction   Level of Consciousness (AVPU) alert   Respiratory Effort Normal   Expansion/Accessory Muscles/Retractions no use of accessory muscles   All Lung Fields Breath Sounds clear   Rhythm/Pattern, Respiratory unlabored   PRE-TX-O2   Device (Oxygen Therapy) room air   SpO2 (!) 93 %   Pulse Oximetry Type Intermittent   $ Pulse Oximetry - Multiple Charge Pulse Oximetry - Multiple   Pulse 75   Resp 19   Incentive Spirometer   $ Incentive Spirometer Charges done with encouragement   Administration (IS) instruction provided, follow-up   Number of Repetitions (IS) 10   Level Incentive Spirometer (mL) 2500   Patient Tolerance (IS) good   Education   $ Education Incentive Spirometry;15 min   Respiratory Evaluation   $ Care Plan Tech Time 15 min

## 2025-02-27 NOTE — PROGRESS NOTES
INPATIENT NEPHROLOGY Progress Note   [facility]    Patient Name: João Ham  Date: 02/27/2025    Reason for consultation: ESRD    Chief Complaint: Colostomy reversal    History of Present Illness:  44-year-old male with a history of ESRD on hemodialysis awaiting for renal transplant, hypertension, chronic colostomy status presenting here for colostomy reversal. On 9/2024, patient was hospitalized for inguinal hernia gangrene. Found to have colonic perforation due to mesh erosion with an abscess. Patient had sigmoidectomy with colostomy since then. Now patient is presenting for a colostomy reversal, prior to this surgery patient had colonoscopy through the stoma by Dr. Connors which is unremarkable. Patient usually has dialysis Monday Wednesday Friday, AV fistula to the left forearm. Patient had dialysis last Saturday with anticipation of surgery.     Interval History:  2/25- HD today and then resume MWF  Update- no thrill/bruit noted on AVF- get u/s of HD access now  2/26- declot today- u/s with thrombosis and complete occlusion of the AV fistula in the left arm  2/27- declot moved to today- HD after    Plan of Care:    Assessment:  AVF clot   ESRD on HD MWF  HTN  SHPT  Anemia of CKD    Plan:    - going for declot today  - HD MWF- extra HD today due to missed HD yest  - continue home BP meds- UF 1-3L  - continue binders with meals, D3, calcitriol   - continue LOR with HD    Patient requesting script for emla cream be sent to hospital pharmacy to be filled prior to discharge.      Thank you for allowing us to participate in this patient's care. We will continue to follow.    Vital Signs:  Temp Readings from Last 3 Encounters:   02/27/25 97.8 °F (36.6 °C) (Oral)   02/17/25 98.4 °F (36.9 °C)   01/14/25 99.7 °F (37.6 °C) (Temporal)       Pulse Readings from Last 3 Encounters:   02/27/25 94   02/17/25 80   01/14/25 101       BP Readings from Last 3 Encounters:   02/27/25 (!) 154/91   02/17/25 (!)  176/94   01/14/25 (!) 96/57       Weight:  Wt Readings from Last 3 Encounters:   02/24/25 114.3 kg (252 lb)   02/17/25 114.3 kg (252 lb)   02/13/25 114.3 kg (252 lb)     Medications:  Scheduled Meds:   acetaminophen  1,000 mg Oral TID    apixaban  2.5 mg Oral BID    bisacodyL  5 mg Oral QHS    cholecalciferol (vitamin D3)  5,000 Units Oral QAM    epoetin mily-ebpx (RETACRIT) injection  50 Units/kg Intravenous Every Mon, Wed, Fri    gabapentin  200 mg Oral BID    hydrALAZINE  100 mg Oral Q8H    isosorbide mononitrate  120 mg Oral QAM    LIDOcaine-prilocaine   Topical (Top) Once    metoprolol succinate  150 mg Oral Daily    mupirocin   Nasal BID    sevelamer carbonate  800 mg Oral TID WM     Continuous Infusions:  PRN Meds:.  Current Facility-Administered Medications:     benzonatate, 200 mg, Oral, TID PRN    cloNIDine, 0.3 mg, Oral, TID PRN    diphenhydrAMINE, 12.5 mg, Intravenous, Q6H PRN    guaiFENesin 100 mg/5 ml, 200 mg, Oral, Q4H PRN    HYDROmorphone, 2 mg, Intravenous, Q4H PRN    lorazepam, 0.25 mg, Intravenous, Q4H PRN    naloxone, 0.02 mg, Intravenous, PRN    ondansetron, 4 mg, Intravenous, Q6H PRN    oxyCODONE, 10 mg, Oral, Q4H PRN  Medications Ordered Prior to Encounter[1]    Review of Systems:  Neg    Physical Exam:  In cath lab    Results:  Lab Results   Component Value Date     02/27/2025    K 4.5 02/27/2025    CL 87 (L) 02/27/2025    CO2 22 (L) 02/27/2025    BUN 96 (H) 02/27/2025    CREATININE 20.6 (H) 02/27/2025    CALCIUM 7.4 (L) 02/27/2025    ANIONGAP 27 (H) 02/27/2025       Lab Results   Component Value Date    CALCIUM 7.4 (L) 02/27/2025    PHOS 15.6 (HH) 02/27/2025       Recent Labs   Lab 02/27/25  0816   WBC 10.61   RBC 3.22*   HGB 9.8*   HCT 29.7*      MCV 92   MCH 30.4   MCHC 33.0       I have personally reviewed pertinent radiological imaging and reports from this admission.    I have spent > 35 minutes providing care for this patient for the above diagnoses. These services have  included chart/data/imaging review, evaluation, exam, formulation of plan, , note preparation, and discussions with staff involved in this patient's care.    Oni Garcia MD MPH  [facility] [address]  [place] [address]  [phone] (p)  [phone] (f)         [1]   No current facility-administered medications on file prior to encounter.     Current Outpatient Medications on File Prior to Encounter   Medication Sig Dispense Refill    cholecalciferol, vitamin D3, 125 mcg (5,000 unit) Tab Take 5,000 Units by mouth every morning.      cloNIDine (CATAPRES) 0.3 MG tablet Take 1 tablet (0.3 mg total) by mouth 3 (three) times daily as needed (SBP > 150).      hydrALAZINE (APRESOLINE) 100 MG tablet Take 1 tablet (100 mg total) by mouth every 8 (eight) hours. Hold for SBP < 120 and before dialysis 270 tablet 0    isosorbide mononitrate (IMDUR) 120 MG 24 hr tablet Take 120 mg by mouth every morning.      metoprolol succinate (TOPROL-XL) 50 MG 24 hr tablet Take 150 mg by mouth once daily.      olmesartan (BENICAR) 40 MG tablet Take 1 tablet (40 mg total) by mouth once daily. 90 tablet 0    apixaban (ELIQUIS) 2.5 mg Tab Take 1 tablet (2.5 mg total) by mouth 2 (two) times daily.      calcitRIOL (ROCALTROL) 0.25 MCG Cap Take 1 capsule (0.25 mcg total) by mouth once daily. (Patient not taking: Reported on 1/14/2025)      calcium carbonate (TUMS) 200 mg calcium (500 mg) chewable tablet Take 2 tablets (1,000 mg total) by mouth 3 (three) times daily. (Patient not taking: Reported on 1/14/2025) 180 tablet 11    epoetin mily-epbx (RETACRIT) 20,000 unit/mL injection Inject 0.67 mLs (13,400 Units total) into the skin every Mon, Wed, Fri.      heparin sodium,porcine (HEPARIN, PORCINE,) 1,000 unit/mL injection Inject 4 mLs (4,000 Units total) into the vein as needed (line care after dialysis).      heparin sodium,porcine (HEPARIN, PORCINE,) 5,000 unit/mL injection Inject 1 mL (5,000 Units total)  into the skin every 8 (eight) hours.      ketoconazole (NIZORAL) 2 % shampoo Apply topically every other day. (Patient not taking: Reported on 1/14/2025)      LIDOcaine-prilocaine (EMLA) cream Apply topically as needed (pre dialysis). 30 g 2    miconazole (MICOTIN) 2 % cream Apply topically 2 (two) times daily. (Patient not taking: Reported on 1/14/2025)      sevelamer carbonate (RENVELA) 2.4 gram PwPk Take by mouth.

## 2025-02-27 NOTE — PLAN OF CARE
Problem: Adult Inpatient Plan of Care  Goal: Plan of Care Review  Outcome: Progressing  Goal: Patient-Specific Goal (Individualized)  Outcome: Progressing  Goal: Absence of Hospital-Acquired Illness or Injury  Outcome: Progressing  Goal: Optimal Comfort and Wellbeing  Outcome: Progressing  Goal: Readiness for Transition of Care  Outcome: Progressing     Problem: Sepsis/Septic Shock  Goal: Optimal Coping  Outcome: Progressing  Goal: Absence of Bleeding  Outcome: Progressing  Goal: Blood Glucose Level Within Targeted Range  Outcome: Progressing  Goal: Absence of Infection Signs and Symptoms  Outcome: Progressing  Goal: Optimal Nutrition Intake  Outcome: Progressing     Problem: Wound  Goal: Optimal Coping  Outcome: Progressing  Goal: Optimal Functional Ability  Outcome: Progressing  Goal: Absence of Infection Signs and Symptoms  Outcome: Progressing  Goal: Improved Oral Intake  Outcome: Progressing  Goal: Optimal Pain Control and Function  Outcome: Progressing  Goal: Skin Health and Integrity  Outcome: Progressing  Goal: Optimal Wound Healing  Outcome: Progressing     Problem: Infection  Goal: Absence of Infection Signs and Symptoms  Outcome: Progressing     Problem: Fall Injury Risk  Goal: Absence of Fall and Fall-Related Injury  Outcome: Progressing     Problem: Hemodialysis  Goal: Safe, Effective Therapy Delivery  Outcome: Progressing  Goal: Effective Tissue Perfusion  Outcome: Progressing  Goal: Absence of Infection Signs and Symptoms  Outcome: Progressing     Problem: Violence Risk or Actual  Goal: Anger and Impulse Control  Outcome: Progressing     Problem: Pain Acute  Goal: Optimal Pain Control and Function  Outcome: Progressing

## 2025-02-27 NOTE — PROGRESS NOTES
[facility] Medicine  Progress Note    Patient Name: João Ham  MRN: [id number]  Patient Class: IP- Inpatient   Admission Date: 2/24/2025  Length of Stay: 3 days  Attending Physician: Galileo Connors MD  Primary Care Provider: Dawson Granado MD        Subjective     Principal Problem:Colostomy status        HPI:  44-year-old male with a history of ESRD on hemodialysis awaiting for renal transplant, hypertension, chronic colostomy status presenting here for colostomy reversal.  On 9/2024, patient was hospitalized for inguinal hernia gangrene.  Found to have colonic perforation due to mesh erosion with an abscess.  Patient had sigmoidectomy with colostomy since then.  Now patient is presenting for a colostomy reversal, prior to this surgery patient had colonoscopy through the stoma by Dr. Connors which is unremarkable.  Patient is seen and examined in the PACU, still lethargic.  Lab work reviewed.  Patient is usually have dialysis Monday Wednesday Friday, AV fistula to the left forearm.  Patient had dialysis last Saturday with anticipation of surgery.  Patient is due dialysis tomorrow.     Overview/Hospital Course:  44-year-old male with a history of ESRD on hemodialysis awaiting for renal transplant, hypertension, chronic colostomy status presenting here for colostomy reversal. Patient had colostomy reversal uneventfully,  patient is due for dialysis however was found to have AV fistula clotted, pending transfer to [facility] for vascular surgery evaluation.  Attempting adequate pain control, his pain is improving and belly is soft.  He had a bowel movement today.  He went for declotting of fistula by vascular surgery today but they are unable to do anesthesia so patient refused, plan again 2/27 with anesthesia and then we will likely need dialysis after that to get back on MWF schedule.    Interval History:   Seen and examined at multidisciplinary rounds, pain is  improving     Review of Systems  Objective:     Vital Signs (Most Recent):  Temp: 97.8 °F (36.6 °C) (02/27/25 1320)  Pulse: 94 (02/27/25 1600)  Resp: 18 (02/27/25 1331)  BP: 130/82 (02/27/25 1600)  SpO2: 97 % (02/27/25 1320) Vital Signs (24h Range):  Temp:  [97.1 °F (36.2 °C)-98.3 °F (36.8 °C)] 97.8 °F (36.6 °C)  Pulse:  [] 94  Resp:  [13-20] 18  SpO2:  [90 %-100 %] 97 %  BP: (118-180)/() 130/82     Weight: 114.3 kg (252 lb)  Body mass index is 32.35 kg/m².    Intake/Output Summary (Last 24 hours) at 2/27/2025 1611  Last data filed at 2/27/2025 1408  Gross per 24 hour   Intake 600 ml   Output 40 ml   Net 560 ml         Physical Exam  Vitals and nursing note reviewed.   Eyes:      Pupils: Pupils are equal, round, and reactive to light.   Neck:      Vascular: No JVD.   Cardiovascular:      Rate and Rhythm: Normal rate and regular rhythm.      Heart sounds: Normal heart sounds.   Pulmonary:      Effort: Pulmonary effort is normal.      Breath sounds: Normal breath sounds.   Abdominal:      General: Bowel sounds are normal. There is no distension.      Palpations: Abdomen is soft.      Tenderness: There is no abdominal tenderness.      Comments: Surgical site is dry and clean and packed.    Musculoskeletal:         General: No deformity.   Lymphadenopathy:      Cervical: No cervical adenopathy.   Skin:     Coloration: Skin is not pale.      Findings: No rash.      Comments: AV fistula to left forearm, good thrills.    Neurological:      Mental Status: He is oriented to person, place, and time. Mental status is at baseline.             Significant Labs: All pertinent labs within the past 24 hours have been reviewed.  CBC:   Recent Labs   Lab 02/26/25  0521 02/27/25  0816   WBC 11.02 10.61   HGB 10.3* 9.8*   HCT 30.4* 29.7*    232     CMP:   Recent Labs   Lab 02/26/25  0521 02/27/25  0816   * 136   K 4.7 4.5   CL 89* 87*   CO2 18* 22*   GLU 96 94   BUN 88* 96*   CREATININE 18.3* 20.6*   CALCIUM  6.9* 7.4*   ANIONGAP 24* 27*       Significant Imaging: I have reviewed all pertinent imaging results/findings within the past 24 hours.  I have reviewed and interpreted all pertinent imaging results/findings within the past 24 hours.    Scheduled Meds:   acetaminophen  1,000 mg Oral TID    apixaban  2.5 mg Oral BID    bisacodyL  5 mg Oral QHS    cholecalciferol (vitamin D3)  5,000 Units Oral QAM    epoetin mily-ebpx (RETACRIT) injection  50 Units/kg Intravenous Every Mon, Wed, Fri    gabapentin  200 mg Oral BID    hydrALAZINE  100 mg Oral Q8H    isosorbide mononitrate  120 mg Oral QAM    metoprolol succinate  150 mg Oral Daily    mupirocin   Nasal BID    sevelamer carbonate  800 mg Oral TID WM     Continuous Infusions:  PRN Meds:.  Current Facility-Administered Medications:     benzonatate, 200 mg, Oral, TID PRN    cloNIDine, 0.3 mg, Oral, TID PRN    diphenhydrAMINE, 12.5 mg, Intravenous, Q6H PRN    guaiFENesin 100 mg/5 ml, 200 mg, Oral, Q4H PRN    HYDROmorphone, 2 mg, Intravenous, Q4H PRN    lorazepam, 0.25 mg, Intravenous, Q4H PRN    naloxone, 0.02 mg, Intravenous, PRN    ondansetron, 4 mg, Intravenous, Q6H PRN    oxyCODONE, 10 mg, Oral, Q4H PRN    Interventional Radiology  Result Date: 2/27/2025  Procedure performed in the Invasive Lab  - See Procedure Log link below for nursing documentation  - See OpNote on Surgeries Tab for physician findings  - See Imaging Tab for radiologist dictation    Cardiac catheterization  Result Date: 2/27/2025  Procedure performed in the Invasive Lab  - See Procedure Log link below for nursing documentation  - See OpNote on Surgeries Tab for physician findings  - See Imaging Tab for radiologist dictation    US Hemodialysis Access  Result Date: 2/25/2025  EXAMINATION: US HEMODIALYSIS ACCESS CLINICAL HISTORY: No flow through fistula; TECHNIQUE: Sagittal and transverse images are obtained through the AV fistula in the left arm. COMPARISON: None FINDINGS: There is flow in the radial  artery at 62 centimeters/second.  There is however occlusion of the AV fistula at the anastomosis and throughout the fistula itself with no flow demonstrated.  Past the AV fistula there is flow at the antecubital fossa at 21 centimeters/second.     Thrombosis and complete occlusion of the AV fistula in the left arm. Electronically signed by: Everett Castellano MD Date:    02/25/2025 Time:    14:23  - pulls last radiology orders      Assessment and Plan     * Colostomy status    Status post reversal today.   Patient had  REVISION OR CLOSURE, COLOSTOMY (N/A)  Exploratory laparotomy   Lysis of adhesions  REctosigmoid resection.   [place] of colostomy with resection  Creation of colorectal anastomosis.     Continue postoperative medical management as per General surgery.   Advance diet per General surgery, okay to DC from their standpoint once cleared medically    Fistula, arteriovenous, acquired  Patient transferred to [facility] due to clotted fistula.  -patient for fistula declot and then dialysis after that  -nephrology following    Anticoagulated  Resume Eliquis if okay with General surgery.     Essential hypertension  Patient's blood pressure range in the last 24 hours was: BP  Min: 131/84  Max: 131/84.The patient's inpatient anti-hypertensive regimen is listed below:  Current Antihypertensives       Plan  - BP is controlled, no changes needed to their regimen  -     ESRD (end stage renal disease)  Creatine stable for now. BMP reviewed- noted Estimated Creatinine Clearance: 8.3 mL/min (A) (based on SCr of 15.5 mg/dL (H)). according to latest data. Based on current GFR, CKD stage is end stage.  Monitor UOP and serial BMP and adjust therapy as needed. Renally dose meds. Avoid nephrotoxic medications and procedures.    On hemodialysis, we will consult Nephrology for dialysis support.         VTE Risk Mitigation (From admission, onward)           Ordered     apixaban tablet 2.5 mg  2 times daily         02/25/25 1118      IP VTE HIGH RISK PATIENT  Once         02/24/25 1628     Place sequential compression device  Until discontinued         02/24/25 1628                    Discharge Planning   MARIA ISABEL: 2/28/2025     Code Status: Full Code   Medical Readiness for Discharge Date:   Discharge Plan A: Home        Treatment, consult recommendation, PT/OT, dispo planning       Marichuy Crum DO  Department of Hospital Medicine   [facility]

## 2025-02-27 NOTE — SUBJECTIVE & OBJECTIVE
Interval History:   Seen and examined at multidisciplinary rounds, pain is improving     Review of Systems  Objective:     Vital Signs (Most Recent):  Temp: 97.8 °F (36.6 °C) (02/27/25 1320)  Pulse: 94 (02/27/25 1600)  Resp: 18 (02/27/25 1331)  BP: 130/82 (02/27/25 1600)  SpO2: 97 % (02/27/25 1320) Vital Signs (24h Range):  Temp:  [97.1 °F (36.2 °C)-98.3 °F (36.8 °C)] 97.8 °F (36.6 °C)  Pulse:  [] 94  Resp:  [13-20] 18  SpO2:  [90 %-100 %] 97 %  BP: (118-180)/() 130/82     Weight: 114.3 kg (252 lb)  Body mass index is 32.35 kg/m².    Intake/Output Summary (Last 24 hours) at 2/27/2025 1611  Last data filed at 2/27/2025 1408  Gross per 24 hour   Intake 600 ml   Output 40 ml   Net 560 ml         Physical Exam  Vitals and nursing note reviewed.   Eyes:      Pupils: Pupils are equal, round, and reactive to light.   Neck:      Vascular: No JVD.   Cardiovascular:      Rate and Rhythm: Normal rate and regular rhythm.      Heart sounds: Normal heart sounds.   Pulmonary:      Effort: Pulmonary effort is normal.      Breath sounds: Normal breath sounds.   Abdominal:      General: Bowel sounds are normal. There is no distension.      Palpations: Abdomen is soft.      Tenderness: There is no abdominal tenderness.      Comments: Surgical site is dry and clean and packed.    Musculoskeletal:         General: No deformity.   Lymphadenopathy:      Cervical: No cervical adenopathy.   Skin:     Coloration: Skin is not pale.      Findings: No rash.      Comments: AV fistula to left forearm, good thrills.    Neurological:      Mental Status: He is oriented to person, place, and time. Mental status is at baseline.             Significant Labs: All pertinent labs within the past 24 hours have been reviewed.  CBC:   Recent Labs   Lab 02/26/25  0521 02/27/25  0816   WBC 11.02 10.61   HGB 10.3* 9.8*   HCT 30.4* 29.7*    232     CMP:   Recent Labs   Lab 02/26/25  0521 02/27/25  0816   * 136   K 4.7 4.5   CL 89* 87*    CO2 18* 22*   GLU 96 94   BUN 88* 96*   CREATININE 18.3* 20.6*   CALCIUM 6.9* 7.4*   ANIONGAP 24* 27*       Significant Imaging: I have reviewed all pertinent imaging results/findings within the past 24 hours.  I have reviewed and interpreted all pertinent imaging results/findings within the past 24 hours.    Scheduled Meds:   acetaminophen  1,000 mg Oral TID    apixaban  2.5 mg Oral BID    bisacodyL  5 mg Oral QHS    cholecalciferol (vitamin D3)  5,000 Units Oral QAM    epoetin mily-ebpx (RETACRIT) injection  50 Units/kg Intravenous Every Mon, Wed, Fri    gabapentin  200 mg Oral BID    hydrALAZINE  100 mg Oral Q8H    isosorbide mononitrate  120 mg Oral QAM    metoprolol succinate  150 mg Oral Daily    mupirocin   Nasal BID    sevelamer carbonate  800 mg Oral TID WM     Continuous Infusions:  PRN Meds:.  Current Facility-Administered Medications:     benzonatate, 200 mg, Oral, TID PRN    cloNIDine, 0.3 mg, Oral, TID PRN    diphenhydrAMINE, 12.5 mg, Intravenous, Q6H PRN    guaiFENesin 100 mg/5 ml, 200 mg, Oral, Q4H PRN    HYDROmorphone, 2 mg, Intravenous, Q4H PRN    lorazepam, 0.25 mg, Intravenous, Q4H PRN    naloxone, 0.02 mg, Intravenous, PRN    ondansetron, 4 mg, Intravenous, Q6H PRN    oxyCODONE, 10 mg, Oral, Q4H PRN    Interventional Radiology  Result Date: 2/27/2025  Procedure performed in the Invasive Lab  - See Procedure Log link below for nursing documentation  - See OpNote on Surgeries Tab for physician findings  - See Imaging Tab for radiologist dictation    Cardiac catheterization  Result Date: 2/27/2025  Procedure performed in the Invasive Lab  - See Procedure Log link below for nursing documentation  - See OpNote on Surgeries Tab for physician findings  - See Imaging Tab for radiologist dictation    US Hemodialysis Access  Result Date: 2/25/2025  EXAMINATION: US HEMODIALYSIS ACCESS CLINICAL HISTORY: No flow through fistula; TECHNIQUE: Sagittal and transverse images are obtained through the AV fistula  in the left arm. COMPARISON: None FINDINGS: There is flow in the radial artery at 62 centimeters/second.  There is however occlusion of the AV fistula at the anastomosis and throughout the fistula itself with no flow demonstrated.  Past the AV fistula there is flow at the antecubital fossa at 21 centimeters/second.     Thrombosis and complete occlusion of the AV fistula in the left arm. Electronically signed by: Everett Castellano MD Date:    02/25/2025 Time:    14:23  - pulls last radiology orders

## 2025-02-27 NOTE — ANESTHESIA POSTPROCEDURE EVALUATION
Anesthesia Post Evaluation    Patient: João Ham    Procedure(s) Performed: Procedure(s) (LRB):  DECLOT- AV FISTULA/GRAFT (N/A)  PTA, AV FISTULA (N/A)    Final Anesthesia Type: general      Patient location during evaluation: PACU  Patient participation: Yes- Able to Participate  Level of consciousness: awake and alert  Post-procedure vital signs: reviewed and stable  Pain management: adequate  Airway patency: patent    PONV status at discharge: No PONV  Anesthetic complications: no      Cardiovascular status: blood pressure returned to baseline and stable  Respiratory status: unassisted and room air  Hydration status: euvolemic  Follow-up not needed.              Vitals Value Taken Time   /92 02/27/25 12:00   Temp 36.2 °C (97.2 °F) 02/27/25 11:10   Pulse 86 02/27/25 12:04   Resp 20 02/27/25 12:04   SpO2 100 % 02/27/25 12:04   Vitals shown include unfiled device data.      Event Time   Out of Recovery 02/27/2025 12:06:43         Pain/Evans Score: Pain Rating Prior to Med Admin: 8 (2/27/2025  5:18 AM)  Pain Rating Post Med Admin: 4 (2/27/2025  5:48 AM)  Evans Score: 8 (2/27/2025 11:30 AM)

## 2025-02-27 NOTE — NURSING
"Patient called out stating, "bleeding at his incision site." Pictures taken. Wound cleaned with wound cleanser. Abd pads and silk tape (compression) applied. Patient vomited  550ML. IV zofran and pain medication given.   "

## 2025-02-27 NOTE — OP NOTE
[facility]  Vascular Surgery  Operative Note    SUMMARY     Date of Procedure: 2/27/2025     Procedure:   Left forearm avf percutaneous thrombectomy, angioplasty    Surgeons and Role:     * Khoobehi, Ali, MD - Primary    Assisting Surgeon: None    Pre-Operative Diagnosis: ESRD on hemodialysis [N18.6, Z99.2]    Post-Operative Diagnosis: Post-Op Diagnosis Codes:     * ESRD on hemodialysis [N18.6, Z99.2]    Anesthesia: General    Operative Findings (including complications, if any): severe stenosis avf    Description of Technical Procedures:   Indications, risks, benefits of left arm fistulogram, thrombectomy, with possible angioplasty were discussed with the patient. Risks discussed included possible bleeding, access failure, infection, cardiac arrest, need for future interventions, need for permcath, pulmonary embolism, and death. Patient was agreeable for the procedure and signed consent.      6F sheath was placed in an antegrade fashion. Second 6F sheath was placed in a retrograde fashion.      Over the wire 5 Barbie was passed distally and inflow to the AVG was established. 90% stenosis was noted near the arterial anastamoses. This was treated with a 5 mm balloon. OTW Barbie was then passed antegrade to clear remaining thrombus. This concluded the thrombectomy portion of the procedure.     Fistulogram was performed. Severe stenosis 80% is noted at the avf outflow in the AC fossa, and this was treated with a 7 mm balloon.    Fistulogram shows stenosis distally not far from the anastomoses which is 60%. This was treated with a 6 mm balloon.    Fistulogram now showed a patent graft without residual thrombus. There is no central stenosis.    Sheaths were removed and puncture site repaired with 4-0 Vicryl suture. An excellent thrill was palpable within the AVG. Patient was brought out of the procedure room in stable condition.    Significant Surgical Tasks Conducted by the Assistant(s), if Applicable:  n/a    Estimated Blood Loss (EBL): * No values recorded between 2/27/2025  9:49 AM and 2/27/2025 10:56 AM *           Implants: * No implants in log *    Specimens:   Specimen (24h ago, onward)      None                    Condition: Good    Disposition: PACU - hemodynamically stable.    Attestation: I performed the procedure.

## 2025-02-27 NOTE — ANESTHESIA PROCEDURE NOTES
Intubation    Date/Time: 2/27/2025 9:33 AM    Performed by: Warren Bae CRNA  Authorized by: Chas Min MD    Intubation:     Induction:  Intravenous    Intubated:  Postinduction    Mask Ventilation:  Easy mask    Attempts:  1    Attempted By:  CRNA    Method of Intubation:  Video laryngoscopy    Blade:  Foster 3    Laryngeal View Grade: Grade I - full view of cords      Difficult Airway Encountered?: No      Complications:  None    Airway Device:  Oral endotracheal tube    Airway Device Size:  7.5    Style/Cuff Inflation:  Cuffed (inflated to minimal occlusive pressure)    Tube secured:  24    Secured at:  The lips    Placement Verified By:  Capnometry    Complicating Factors:  None    Findings Post-Intubation:  BS equal bilateral and atraumatic/condition of teeth unchanged

## 2025-02-27 NOTE — ASSESSMENT & PLAN NOTE
Status post reversal today.   Patient had  REVISION OR CLOSURE, COLOSTOMY (N/A)  Exploratory laparotomy   Lysis of adhesions  REctosigmoid resection.   Takedwon of colostomy with resection  Creation of colorectal anastomosis.     Continue postoperative medical management as per General surgery.   Advance diet per General surgery, okay to DC from their standpoint once cleared medically

## 2025-02-27 NOTE — TRANSFER OF CARE
"Anesthesia Transfer of Care Note    Patient: João Ham    Procedure(s) Performed: Procedure(s) (LRB):  DECLOT- AV FISTULA/GRAFT (N/A)  PTA, AV FISTULA (N/A)    Patient location: PACU    Anesthesia Type: general    Transport from OR: Transported from OR on 2-3 L/min O2 by NC with adequate spontaneous ventilation    Post pain: adequate analgesia    Post assessment: no apparent anesthetic complications    Post vital signs: stable    Level of consciousness: awake and alert    Nausea/Vomiting: no nausea/vomiting    Complications: none    Transfer of care protocol was followed      Last vitals: Visit Vitals  BP (!) 154/91   Pulse 94   Temp 36.6 °C (97.8 °F) (Oral)   Resp 18   Ht 6' 2" (1.88 m)   Wt 114.3 kg (252 lb)   SpO2 98%   BMI 32.35 kg/m²     "

## 2025-02-28 VITALS
TEMPERATURE: 98 F | HEART RATE: 119 BPM | HEIGHT: 74 IN | DIASTOLIC BLOOD PRESSURE: 74 MMHG | SYSTOLIC BLOOD PRESSURE: 104 MMHG | OXYGEN SATURATION: 98 % | RESPIRATION RATE: 20 BRPM | WEIGHT: 252.63 LBS | BODY MASS INDEX: 32.42 KG/M2

## 2025-02-28 PROBLEM — Z98.890 HISTORY OF COLOSTOMY REVERSAL: Status: ACTIVE | Noted: 2025-02-24

## 2025-02-28 LAB
ANION GAP SERPL CALC-SCNC: 19 MMOL/L (ref 8–16)
BUN SERPL-MCNC: 56 MG/DL (ref 6–20)
CALCIUM SERPL-MCNC: 8.9 MG/DL (ref 8.7–10.5)
CHLORIDE SERPL-SCNC: 94 MMOL/L (ref 95–110)
CO2 SERPL-SCNC: 23 MMOL/L (ref 23–29)
CREAT SERPL-MCNC: 15.4 MG/DL (ref 0.5–1.4)
EST. GFR  (NO RACE VARIABLE): 3.6 ML/MIN/1.73 M^2
GLUCOSE SERPL-MCNC: 96 MG/DL (ref 70–110)
OHS QRS DURATION: 102 MS
OHS QTC CALCULATION: 510 MS
PHOSPHATE SERPL-MCNC: 10.7 MG/DL (ref 2.7–4.5)
POTASSIUM SERPL-SCNC: 4 MMOL/L (ref 3.5–5.1)
SODIUM SERPL-SCNC: 136 MMOL/L (ref 136–145)

## 2025-02-28 PROCEDURE — 36415 COLL VENOUS BLD VENIPUNCTURE: CPT | Performed by: HOSPITALIST

## 2025-02-28 PROCEDURE — 80048 BASIC METABOLIC PNL TOTAL CA: CPT | Performed by: HOSPITALIST

## 2025-02-28 PROCEDURE — 84100 ASSAY OF PHOSPHORUS: CPT | Performed by: HOSPITALIST

## 2025-02-28 PROCEDURE — 25000003 PHARM REV CODE 250: Performed by: SURGERY

## 2025-02-28 PROCEDURE — 25000003 PHARM REV CODE 250: Performed by: FAMILY MEDICINE

## 2025-02-28 PROCEDURE — 90935 HEMODIALYSIS ONE EVALUATION: CPT

## 2025-02-28 PROCEDURE — 63600175 PHARM REV CODE 636 W HCPCS: Mod: JW,TB | Performed by: SURGERY

## 2025-02-28 RX ORDER — ACETAMINOPHEN 325 MG/1
650 TABLET ORAL 3 TIMES DAILY
Start: 2025-02-28 | End: 2025-03-05

## 2025-02-28 RX ORDER — ACETAMINOPHEN 325 MG/1
650 TABLET ORAL 3 TIMES DAILY
Status: DISCONTINUED | OUTPATIENT
Start: 2025-02-28 | End: 2025-02-28 | Stop reason: HOSPADM

## 2025-02-28 RX ORDER — HYDROCODONE BITARTRATE AND ACETAMINOPHEN 10; 325 MG/1; MG/1
1 TABLET ORAL EVERY 6 HOURS PRN
Refills: 0 | Status: DISCONTINUED | OUTPATIENT
Start: 2025-02-28 | End: 2025-02-28 | Stop reason: HOSPADM

## 2025-02-28 RX ORDER — LIDOCAINE AND PRILOCAINE 25; 25 MG/G; MG/G
CREAM TOPICAL
Qty: 60 G | Refills: 1 | Status: SHIPPED | OUTPATIENT
Start: 2025-02-28 | End: 2025-03-05 | Stop reason: SDUPTHER

## 2025-02-28 RX ORDER — POLYETHYLENE GLYCOL 3350 17 G/17G
17 POWDER, FOR SOLUTION ORAL DAILY
Status: DISCONTINUED | OUTPATIENT
Start: 2025-02-28 | End: 2025-02-28 | Stop reason: HOSPADM

## 2025-02-28 RX ORDER — OXYCODONE AND ACETAMINOPHEN 10; 325 MG/1; MG/1
1 TABLET ORAL EVERY 6 HOURS PRN
Qty: 20 TABLET | Refills: 0 | Status: SHIPPED | OUTPATIENT
Start: 2025-02-28 | End: 2025-03-06

## 2025-02-28 RX ORDER — OXYCODONE AND ACETAMINOPHEN 10; 325 MG/1; MG/1
1 TABLET ORAL EVERY 6 HOURS PRN
Refills: 0 | Status: DISCONTINUED | OUTPATIENT
Start: 2025-02-28 | End: 2025-02-28 | Stop reason: HOSPADM

## 2025-02-28 RX ORDER — GABAPENTIN 100 MG/1
100 CAPSULE ORAL 3 TIMES DAILY
Status: DISCONTINUED | OUTPATIENT
Start: 2025-02-28 | End: 2025-02-28 | Stop reason: HOSPADM

## 2025-02-28 RX ORDER — AMOXICILLIN 250 MG
1 CAPSULE ORAL DAILY PRN
Status: DISCONTINUED | OUTPATIENT
Start: 2025-02-28 | End: 2025-02-28 | Stop reason: HOSPADM

## 2025-02-28 RX ADMIN — SEVELAMER CARBONATE 800 MG: 800 TABLET, FILM COATED ORAL at 12:02

## 2025-02-28 RX ADMIN — OXYCODONE HYDROCHLORIDE AND ACETAMINOPHEN 1 TABLET: 10; 325 TABLET ORAL at 12:02

## 2025-02-28 RX ADMIN — HYDRALAZINE HYDROCHLORIDE 100 MG: 25 TABLET, FILM COATED ORAL at 06:02

## 2025-02-28 RX ADMIN — EPOETIN ALFA-EPBX 5700 UNITS: 10000 INJECTION, SOLUTION INTRAVENOUS; SUBCUTANEOUS at 09:02

## 2025-02-28 NOTE — PLAN OF CARE
Patient cleared for discharge from case management standpoint.    Follow up appointments scheduled and added to AVS.    Chart and discharge orders reviewed.  Patient discharged home with no further case management needs.      02/28/25 1349   Final Note   Assessment Type Final Discharge Note   Anticipated Discharge Disposition Home   Hospital Resources/Appts/Education Provided Provided patient/caregiver with written discharge plan information;Provided education on problems/symptoms using teachback;Appointments scheduled and added to AVS   Post-Acute Status   Discharge Delays None known at this time

## 2025-02-28 NOTE — PROGRESS NOTES
3000 ml net uf removed   02/27/25 1720   Required for all Hemodialysis Patients   Hepatitis Status negative   Handoff Report   Received From Nabila   Given To Carola   Treatment Type   Treatment Type Maintenance   Vital Signs   Temp 98.1 °F (36.7 °C)   Temp Source Oral   Pulse (!) 113   Resp 18   SpO2 97 %   BP (!) 143/83   BP Location Right arm   BP Method Automatic   Patient Position Lying   Assessments (Pre/Post)   Safety vein preservation armband present   Date Hepatitis Profile Obtained 01/27/25   Blood Liters Processed (BLP) 50.4   Transport Modality bed   Level of Consciousness (AVPU) alert   Pain   Preferred Pain Scale number (Numeric Rating Pain Scale)   Pain Rating (0-10): Rest 2   Pre-Hemodialysis Assessment   Patient Status Departed        Hemodialysis AV Fistula Left forearm   No placement date or time found.   Present Prior to Hospital Arrival?: Yes  Size/Length: 17 G  Location: Left forearm   Site Assessment Clean;Dry;Intact   Patency Present;Thrill;Bruit   Status Deaccessed   Flows Good   Dressing Status Clean;Dry;Intact   Site Condition No complications   Dressing Gauze   Post-Hemodialysis Assessment   Rinseback Volume (mL) 250 mL   Blood Volume Processed (Liters) 50.4 L   Dialyzer Clearance Lightly streaked   Duration of Treatment 180 minutes   Additional Fluid Intake (mL) 500 mL   Total UF (mL) 3500 mL   Net Fluid Removal 3000   Patient Response to Treatment tolerated well   Post-Treatment Weight 111.3 kg (245 lb 6 oz)   Treatment Weight Change -3   Arterial bleeding stop time (min) 10 min   Venous bleeding stop time (min) 10 min   Post-Hemodialysis Comments tx completed   Edema   Edema generalized     Educated on avf

## 2025-02-28 NOTE — NURSING
Central monitor called reporting HR in 140's.  Patient noted to be on a brisk walk in the [address] making multiple laps NAD noted. Primary nurse notified.

## 2025-02-28 NOTE — DISCHARGE SUMMARY
[facility] Medicine  Discharge Summary      Patient Name: João Ham  MRN: [id number]  [facility]: [id number]  Patient Class: IP- Inpatient  Admission Date: 2/24/2025  Hospital Length of Stay: 4 days  Discharge Date and Time:  02/28/2025 3:40 PM  Attending Physician: Blanche att. providers found   Discharging Provider: Marichuy Crum DO  Primary Care Provider: Dawson Granado MD    Primary Care Team: Networked reference to record PCT     HPI:   44-year-old male with a history of ESRD on hemodialysis awaiting for renal transplant, hypertension, chronic colostomy status presenting here for colostomy reversal.  On 9/2024, patient was hospitalized for inguinal hernia gangrene.  Found to have colonic perforation due to mesh erosion with an abscess.  Patient had sigmoidectomy with colostomy since then.  Now patient is presenting for a colostomy reversal, prior to this surgery patient had colonoscopy through the stoma by Dr. Connors which is unremarkable.  Patient is seen and examined in the PACU, still lethargic.  Lab work reviewed.  Patient is usually have dialysis Monday Wednesday Friday, AV fistula to the left forearm.  Patient had dialysis last Saturday with anticipation of surgery.  Patient is due dialysis tomorrow.     Procedure(s) (LRB):  DECLOT- AV FISTULA/GRAFT (N/A)  PTA, AV FISTULA (N/A)      Hospital Course:   44-year-old male with a history of ESRD on hemodialysis awaiting for renal transplant, hypertension, chronic colostomy status presenting here for colostomy reversal. Patient had colostomy reversal uneventfully,  patient is due for dialysis however was found to have AV fistula clotted, pending transfer to [facility] for vascular surgery evaluation.  Attempting adequate pain control, his pain is improving and belly is soft.  He had a bowel movement today.  He went for declotting of fistula by vascular surgery today but they are unable to do anesthesia so patient refused,  done on 2/27 with anesthesia and then dialysis after that.  Repeat HD 2/28 to get back on MWF schedule.  Okay to discharge per Nephrology and General surgery with follow up as he tolerated regular diet and had BM.     Goals of Care Treatment Preferences:  Code Status: Full Code      SDOH Screening:  The patient was screened for food insecurity, housing instability, transportation needs, utility difficulties, and interpersonal safety. The social determinant(s) of health identified as a concern this admission are:  Housing instability  Food insecurity    Will not discuss with case management and/or community health workers.    Social Drivers of Health with Concerns     Food Insecurity: Food Insecurity Present (2/24/2025)   Housing Stability: High Risk (2/24/2025)        Consults:   Consults (From admission, onward)          Status Ordering Provider     Inpatient consult to Vascular Surgery  Once        Provider:  Lorraine Salvador MD    Completed KAYLEN OLSEN     Inpatient consult to Nephrology  Once        Provider:  Oni Garcia MD    Completed HUMERA HENDRICKSON            * History of colostomy reversal    Status post reversal today.   Patient had  REVISION OR CLOSURE, COLOSTOMY (N/A)  Exploratory laparotomy   Lysis of adhesions  REctosigmoid resection.   Takedwon of colostomy with resection  Creation of colorectal anastomosis.     Continue postoperative medical management as per General surgery.   Advance diet per General surgery, okay to DC from their standpoint once cleared medically    Fistula, arteriovenous, acquired  Patient transferred to [facility] due to clotted fistula.  -patient for fistula declot and then dialysis after that  -nephrology following    Anticoagulated  Resume Eliquis if okay with General surgery.     Essential hypertension  Patient's blood pressure range in the last 24 hours was: BP  Min: 131/84  Max: 131/84.The patient's inpatient anti-hypertensive regimen is listed below:  Current  Antihypertensives       Plan  - BP is controlled, no changes needed to their regimen  -     ESRD (end stage renal disease)  Creatine stable for now. BMP reviewed- noted Estimated Creatinine Clearance: 8.3 mL/min (A) (based on SCr of 15.5 mg/dL (H)). according to latest data. Based on current GFR, CKD stage is end stage.  Monitor UOP and serial BMP and adjust therapy as needed. Renally dose meds. Avoid nephrotoxic medications and procedures.    On hemodialysis, we will consult Nephrology for dialysis support.         Final Active Diagnoses:    Diagnosis Date Noted POA    PRINCIPAL PROBLEM:  History of colostomy reversal [Z98.890] 02/24/2025 Not Applicable    Fistula, arteriovenous, acquired [I77.0] 02/26/2025 Yes    Anticoagulated [Z79.01] 10/25/2024 Not Applicable    Essential hypertension [I10] 07/17/2024 Yes    ESRD (end stage renal disease) [N18.6] 10/10/2022 Yes      Problems Resolved During this Admission:       Discharged Condition: good    Disposition: Home or Self Care    Follow Up:   Follow-up Information       Galileo Connors MD Follow up on 3/11/2025.    Specialties: General Surgery, Colon and Rectal Surgery, Surgery  Why: 1pm  Contact information:  [address]  [place] [address]  [phone]               Dawson Granado MD Follow up on 3/20/2025.    Specialty: Family Medicine  Why: 230pm  Contact information:  [address]  [place] [address]  [phone]                           Patient Instructions:      Notify your health care provider if you experience any of the following:  temperature >100.4     Notify your health care provider if you experience any of the following:  persistent nausea and vomiting or diarrhea     Notify your health care provider if you experience any of the following:  severe uncontrolled pain     Notify your health care provider if you experience any of the following:  difficulty breathing or increased cough     Notify your health care provider if you  "experience any of the following:  persistent dizziness, light-headedness, or visual disturbances     Activity as tolerated       Significant Diagnostic Studies: Labs: BMP:   Recent Labs   Lab 02/27/25  0816 02/28/25  0629   GLU 94 96    136   K 4.5 4.0   CL 87* 94*   CO2 22* 23   BUN 96* 56*   CREATININE 20.6* 15.4*   CALCIUM 7.4* 8.9   , CMP   Recent Labs   Lab 02/27/25  0816 02/28/25  0629    136   K 4.5 4.0   CL 87* 94*   CO2 22* 23   GLU 94 96   BUN 96* 56*   CREATININE 20.6* 15.4*   CALCIUM 7.4* 8.9   ANIONGAP 27* 19*   , CBC   Recent Labs   Lab 02/27/25  0816   WBC 10.61   HGB 9.8*   HCT 29.7*      , INR   Lab Results   Component Value Date    INR 1.0 02/24/2025    INR 1.0 02/13/2025    INR 1.0 10/25/2024   , Lipid Panel   Lab Results   Component Value Date    CHOL 141 10/11/2022    HDL 39 (L) 10/11/2022    LDLCALC 88.8 10/11/2022    TRIG 66 10/11/2022    CHOLHDL 27.7 10/11/2022   , Troponin No results for input(s): "TROPONINI" in the last 168 hours., A1C: No results for input(s): "HGBA1C" in the last 4320 hours., and All labs within the past 24 hours have been reviewed  Radiology:   US Hemodialysis Access [[id number]] Resulted: 02/25/25 1423   Order Status: Completed Updated: 02/25/25 1426   Narrative:     EXAMINATION:  US HEMODIALYSIS ACCESS    CLINICAL HISTORY:  No flow through fistula;    TECHNIQUE:  Sagittal and transverse images are obtained through the AV fistula in the left arm.    COMPARISON:  None    FINDINGS:  There is flow in the radial artery at 62 centimeters/second.  There is however occlusion of the AV fistula at the anastomosis and throughout the fistula itself with no flow demonstrated.  Past the AV fistula there is flow at the antecubital fossa at 21 centimeters/second.   Impression:       Thrombosis and complete occlusion of the AV fistula in the left arm.      Electronically signed by: Everett Castellano MD  Date: 02/25/2025  Time: 14:23       Pending Diagnostic Studies:  "      Procedure Component Value Units Date/Time    EKG 12-lead [[id number]] Collected: 02/27/25 0721    Order Status: Sent Lab Status: In process Updated: 02/27/25 0755     QRS Duration 102 ms      OHS QTC Calculation 510 ms     Narrative:      Test Reason : Z01.810,    Vent. Rate :  96 BPM     Atrial Rate :  96 BPM     P-R Int : 162 ms          QRS Dur : 102 ms      QT Int : 404 ms       P-R-T Axes :  44 -72  16 degrees    QTcB Int : 510 ms    Normal sinus rhythm  Left anterior fascicular block  Moderate voltage criteria for LVH, may be normal variant ( R in aVL ,  [place] product )  Anterolateral infarct ,age undetermined  Prolonged QT  Abnormal ECG  When compared with ECG of 29-Oct-2024 08:37,  Anterior infarct is now Present  Anterolateral infarct is now Present    Referred By: AAAREFERRAL SELF           Confirmed By:     Hepatitis B surface antibody [[id number]] Collected: 02/27/25 1520    Order Status: Sent Lab Status: In process Updated: 02/27/25 1530    Specimen: Blood     Hepatitis B surface antigen [[id number]] Collected: 02/27/25 1520    Order Status: Sent Lab Status: In process Updated: 02/27/25 1530    Specimen: Blood            Medications:  Reconciled Home Medications:      Medication List        START taking these medications      acetaminophen 325 MG tablet  Commonly known as: TYLENOL  Take 2 tablets (650 mg total) by mouth 3 (three) times daily. for 5 days     oxyCODONE-acetaminophen  mg per tablet  Commonly known as: PERCOCET  Take 1 tablet by mouth every 6 (six) hours as needed for Pain.            CONTINUE taking these medications      apixaban 2.5 mg Tab  Commonly known as: ELIQUIS  Take 1 tablet (2.5 mg total) by mouth 2 (two) times daily.     cholecalciferol (vitamin D3) 125 mcg (5,000 unit) Tab  Take 5,000 Units by mouth every morning.     cloNIDine 0.3 MG tablet  Commonly known as: CATAPRES  Take 1 tablet (0.3 mg total) by mouth 3 (three) times daily as needed (SBP > 150).     epoetin  mily-epbx 20,000 unit/mL injection  Commonly known as: RETACRIT  Inject 0.67 mLs (13,400 Units total) into the skin every Mon, Wed, Fri.     * heparin (porcine) 1,000 unit/mL injection  Inject 4 mLs (4,000 Units total) into the vein as needed (line care after dialysis).     * heparin (porcine) 5,000 unit/mL injection  Inject 1 mL (5,000 Units total) into the skin every 8 (eight) hours.     hydrALAZINE 100 MG tablet  Commonly known as: APRESOLINE  Take 1 tablet (100 mg total) by mouth every 8 (eight) hours. Hold for SBP < 120 and before dialysis     isosorbide mononitrate 120 MG 24 hr tablet  Commonly known as: IMDUR  Take 120 mg by mouth every morning.     LIDOcaine-prilocaine cream  Commonly known as: EMLA  Apply topically as needed (pre dialysis).     metoprolol succinate 50 MG 24 hr tablet  Commonly known as: TOPROL-XL  Take 150 mg by mouth once daily.     olmesartan 40 MG tablet  Commonly known as: BENICAR  Take 1 tablet (40 mg total) by mouth once daily.     sevelamer carbonate 2.4 gram Pwpk  Commonly known as: RENVELA  Take by mouth.           * This list has 2 medication(s) that are the same as other medications prescribed for you. Read the directions carefully, and ask your doctor or other care provider to review them with you.                STOP taking these medications      calcium carbonate 200 mg calcium (500 mg) chewable tablet  Commonly known as: TUMS     ketoconazole 2 % shampoo  Commonly known as: NIZORAL     miconazole 2 % cream  Commonly known as: MICOTIN     neomycin 500 mg Tab  Commonly known as: MYCIFRADIN            ASK your doctor about these medications      calcitRIOL 0.25 MCG Cap  Commonly known as: ROCALTROL  Take 1 capsule (0.25 mcg total) by mouth once daily.              Indwelling Lines/Drains at time of discharge:   Lines/Drains/Airways       Drain  Duration                  Hemodialysis AV Fistula Left forearm -- days         Closed/Suction Drain 02/24/25 Tube - 1 Right Abdomen Bulb  19 Fr. 4 days                    Time spent on the discharge of patient: 35 minutes         Marichuy Crum DO  Department of Hospital Medicine  [facility]

## 2025-02-28 NOTE — NURSING
Patient off unit to surgery before this nurse could complete a full assessment. Patient went for dialysis directly after surgery and has not returned to the unit at this time. Please see focused assessment as charted.

## 2025-02-28 NOTE — NURSING
Consent and Hep b status verified, removed 3000 uf net, no issues with access, post thrill and bruit noted, patient stable throughout treatment, educated patient on infection control, report given to Sharon, floor nurse      02/28/25 1115   Handoff Report   Received From Marta   Given To Sharon   Vital Signs   Temp 98.2 °F (36.8 °C)   Temp Source Oral   Pulse (!) 119   Heart Rate Source Monitor   Resp 16   SpO2 98 %   Pulse Oximetry Type Intermittent   Probe Placed On (Pulse Ox) Left:;finger   Oximetry Probe Status Applied   Device (Oxygen Therapy) room air   /74   BP Location Right arm   BP Method Automatic   Patient Position Lying   Assessments (Pre/Post)   Consent Obtained yes   Safety vein preservation armband present   Date Hepatitis Profile Obtained 01/27/25   Blood Liters Processed (BLP) 55   Transport Modality bed   Level of Consciousness (AVPU) alert   Dialyzer Clearance mildly streaked   Pain   Preferred Pain Scale number (Numeric Rating Pain Scale)   Comfort/Acceptable Pain Level 0   Pain Rating (0-10): Rest 0   Pain Rating (0-10): Activity 0   Pain Management Interventions quiet environment facilitated;position adjusted;pillow support provided   Pain/Comfort Interventions   Fever Reduction/Comfort Measures lightweight clothing;lightweight bedding   Pre-Hemodialysis Assessment   Additional Dialysis Information Needed Yes   Patient Status Departed   Treatment Consent Verified Yes   Treatment Status Completed        Hemodialysis AV Fistula Left forearm   No placement date or time found.   Present Prior to Hospital Arrival?: Yes  Size/Length: 17 G  Location: Left forearm   Site Assessment Clean;Dry;Intact   Patency Present;Thrill;Bruit   Status Deaccessed   Dressing Intervention First dressing   Dressing Status Clean;Dry;Intact   Site Condition No complications   Dressing Gauze   Post-Hemodialysis Assessment   Rinseback Volume (mL) 250 mL   Blood Volume Processed (Liters) 55 L   Dialyzer Clearance  Lightly streaked   Duration of Treatment 180 minutes   Additional Fluid Intake (mL) 500 mL   Total UF (mL) 3500 mL   Net Fluid Removal 3000   Patient Response to Treatment miya   Post-Treatment Weight 111.6 kg (246 lb 0.5 oz)   Treatment Weight Change -3   Arterial bleeding stop time (min) 5 min   Venous bleeding stop time (min) 5 min   Post-Hemodialysis Comments stable   Edema   Edema generalized

## 2025-02-28 NOTE — PROGRESS NOTES
INPATIENT NEPHROLOGY Progress Note   [facility]    Patient Name: João Ham  Date: 02/28/2025    Reason for consultation: ESRD    Chief Complaint: Colostomy reversal    History of Present Illness:  44-year-old male with a history of ESRD on hemodialysis awaiting for renal transplant, hypertension, chronic colostomy status presenting here for colostomy reversal. On 9/2024, patient was hospitalized for inguinal hernia gangrene. Found to have colonic perforation due to mesh erosion with an abscess. Patient had sigmoidectomy with colostomy since then. Now patient is presenting for a colostomy reversal, prior to this surgery patient had colonoscopy through the stoma by Dr. Connors which is unremarkable. Patient usually has dialysis Monday Wednesday Friday, AV fistula to the left forearm. Patient had dialysis last Saturday with anticipation of surgery.     Interval History:  2/25- HD today and then resume MWF  Update- no thrill/bruit noted on AVF- get u/s of HD access now  2/26- declot today- u/s with thrombosis and complete occlusion of the AV fistula in the left arm  2/27- declot moved to today- HD after  2/28 seen on HD- arranged for outpatient HD tomorrow at [facility] at 1045 am so he will get his 3 treatments this week    Plan of Care:    Assessment:  AVF clot   ESRD on HD MWF  HTN  SHPT  Anemia of CKD    Plan:    - s/p declot 2/27- educated patient at length to NOT WRAP HIS ACCESS ARM   - HD MWF  - continue home BP meds- UF 1-3L  - continue binders with meals, D3, calcitriol   - continue LOR with HD    Patient requesting script for emla cream be sent to hospital pharmacy to be filled prior to discharge.      Thank you for allowing us to participate in this patient's care. We will continue to follow.    Vital Signs:  Temp Readings from Last 3 Encounters:   02/28/25 98.3 °F (36.8 °C) (Oral)   02/17/25 98.4 °F (36.9 °C)   01/14/25 99.7 °F (37.6 °C) (Temporal)       Pulse Readings from Last 3  "Encounters:   02/28/25 (!) 115   02/17/25 80   01/14/25 101       BP Readings from Last 3 Encounters:   02/28/25 (!) 134/90   02/17/25 (!) 176/94   01/14/25 (!) 96/57       Weight:  Wt Readings from Last 3 Encounters:   02/28/25 114.6 kg (252 lb 10.4 oz)   02/17/25 114.3 kg (252 lb)   02/13/25 114.3 kg (252 lb)     Medications:  Scheduled Meds:   acetaminophen  650 mg Oral TID    apixaban  2.5 mg Oral BID    cholecalciferol (vitamin D3)  5,000 Units Oral QAM    epoetin mily-ebpx (RETACRIT) injection  50 Units/kg Intravenous Every Mon, Wed, Fri    gabapentin  100 mg Oral TID    hydrALAZINE  100 mg Oral Q8H    isosorbide mononitrate  120 mg Oral QAM    metoprolol succinate  150 mg Oral Daily    mupirocin   Nasal BID    polyethylene glycol  17 g Oral Daily    sevelamer carbonate  800 mg Oral TID WM     Continuous Infusions:  PRN Meds:.  Current Facility-Administered Medications:     benzonatate, 200 mg, Oral, TID PRN    cloNIDine, 0.3 mg, Oral, TID PRN    diphenhydrAMINE, 12.5 mg, Intravenous, Q6H PRN    guaiFENesin 100 mg/5 ml, 200 mg, Oral, Q4H PRN    HYDROcodone-acetaminophen, 1 tablet, Oral, Q6H PRN    lorazepam, 0.25 mg, Intravenous, Q4H PRN    naloxone, 0.02 mg, Intravenous, PRN    ondansetron, 4 mg, Intravenous, Q6H PRN    oxyCODONE-acetaminophen, 1 tablet, Oral, Q6H PRN    senna-docusate 8.6-50 mg, 1 tablet, Oral, Daily PRN  Medications Ordered Prior to Encounter[1]    Review of Systems:  Neg    Physical Exam:  In cath lab    Results:  Lab Results   Component Value Date     02/28/2025    K 4.0 02/28/2025    CL 94 (L) 02/28/2025    CO2 23 02/28/2025    BUN 56 (H) 02/28/2025    CREATININE 15.4 (H) 02/28/2025    CALCIUM 8.9 02/28/2025    ANIONGAP 19 (H) 02/28/2025       Lab Results   Component Value Date    CALCIUM 8.9 02/28/2025    PHOS 10.7 (HH) 02/28/2025       No results for input(s): "WBC", "RBC", "HGB", "HCT", "PLT", "MCV", "MCH", "MCHC" in the last 24 hours.      I have personally reviewed pertinent " radiological imaging and reports from this admission.    I have spent > 35 minutes providing care for this patient for the above diagnoses. These services have included chart/data/imaging review, evaluation, exam, formulation of plan, , note preparation, and discussions with staff involved in this patient's care.    Oni Garcia MD MPH  [facility]  [address] [place] [address]  [place] [id number]  [phone] (p)  [phone] (f)         [1]   No current facility-administered medications on file prior to encounter.     Current Outpatient Medications on File Prior to Encounter   Medication Sig Dispense Refill    cholecalciferol, vitamin D3, 125 mcg (5,000 unit) Tab Take 5,000 Units by mouth every morning.      cloNIDine (CATAPRES) 0.3 MG tablet Take 1 tablet (0.3 mg total) by mouth 3 (three) times daily as needed (SBP > 150).      hydrALAZINE (APRESOLINE) 100 MG tablet Take 1 tablet (100 mg total) by mouth every 8 (eight) hours. Hold for SBP < 120 and before dialysis 270 tablet 0    isosorbide mononitrate (IMDUR) 120 MG 24 hr tablet Take 120 mg by mouth every morning.      metoprolol succinate (TOPROL-XL) 50 MG 24 hr tablet Take 150 mg by mouth once daily.      olmesartan (BENICAR) 40 MG tablet Take 1 tablet (40 mg total) by mouth once daily. 90 tablet 0    apixaban (ELIQUIS) 2.5 mg Tab Take 1 tablet (2.5 mg total) by mouth 2 (two) times daily.      calcitRIOL (ROCALTROL) 0.25 MCG Cap Take 1 capsule (0.25 mcg total) by mouth once daily. (Patient not taking: Reported on 1/14/2025)      calcium carbonate (TUMS) 200 mg calcium (500 mg) chewable tablet Take 2 tablets (1,000 mg total) by mouth 3 (three) times daily. (Patient not taking: Reported on 1/14/2025) 180 tablet 11    epoetin mily-epbx (RETACRIT) 20,000 unit/mL injection Inject 0.67 mLs (13,400 Units total) into the skin every Mon, Wed, Fri.      heparin sodium,porcine (HEPARIN, PORCINE,) 1,000 unit/mL injection Inject 4 mLs (4,000 Units  total) into the vein as needed (line care after dialysis).      heparin sodium,porcine (HEPARIN, PORCINE,) 5,000 unit/mL injection Inject 1 mL (5,000 Units total) into the skin every 8 (eight) hours.      ketoconazole (NIZORAL) 2 % shampoo Apply topically every other day. (Patient not taking: Reported on 1/14/2025)      LIDOcaine-prilocaine (EMLA) cream Apply topically as needed (pre dialysis). 30 g 2    miconazole (MICOTIN) 2 % cream Apply topically 2 (two) times daily. (Patient not taking: Reported on 1/14/2025)      sevelamer carbonate (RENVELA) 2.4 gram PwPk Take by mouth.

## 2025-03-01 LAB
HBV SURFACE AB SER QL: NON REACTIVE
HBV SURFACE AG SERPL QL IA: NEGATIVE

## 2025-03-03 ENCOUNTER — TELEPHONE (OUTPATIENT)
Dept: SURGERY | Facility: CLINIC | Age: 45
End: 2025-03-03
Payer: COMMERCIAL

## 2025-03-03 ENCOUNTER — PATIENT OUTREACH (OUTPATIENT)
Dept: ADMINISTRATIVE | Facility: CLINIC | Age: 45
End: 2025-03-03
Payer: COMMERCIAL

## 2025-03-03 ENCOUNTER — TELEPHONE (OUTPATIENT)
Dept: FAMILY MEDICINE | Facility: CLINIC | Age: 45
End: 2025-03-03
Payer: COMMERCIAL

## 2025-03-03 PROCEDURE — 86833 HLA CLASS II HIGH DEFIN QUAL: CPT | Mod: TXP | Performed by: NURSE PRACTITIONER

## 2025-03-03 PROCEDURE — 86832 HLA CLASS I HIGH DEFIN QUAL: CPT | Mod: TXP | Performed by: NURSE PRACTITIONER

## 2025-03-03 NOTE — PROGRESS NOTES
C3 nurse spoke with João Ham for a TCC post hospital discharge follow up call. The patient does not have a scheduled HOSFU appointment with Dawson Granado MD within 5-7 days post hospital discharge date 02/28/25. C3 nurse was unable to schedule HOSFU appointment in [facility].    Message sent to PCP staff requesting they contact patient and schedule follow up appointment.

## 2025-03-03 NOTE — TELEPHONE ENCOUNTER
Spoke to pt, has concern that pain medication is not helping, some nausea and decreased appetite since  d/c from hospital. States he will go to ER since his B/P is not controlled nor is his pain. Notify Dr Connors.

## 2025-03-03 NOTE — TELEPHONE ENCOUNTER
@ 1503 secure high priority message to pcp and surgical staff, per patient's request, advising of current s/s, HH request

## 2025-03-03 NOTE — TELEPHONE ENCOUNTER
See message, please avise.   Thank You  Shilpi    He would like HH and wound care  He has appt 3/6/25 with you for hospital f/u

## 2025-03-03 NOTE — TELEPHONE ENCOUNTER
----- Message from ELI Hilton sent at 3/3/2025  1:11 PM CST -----  Regarding: tcc post-d/c call  Apologies, patient details attachedtc post-d/c callReceived: TodayLida Hernadez, Galileo Suárez MD; BRITT Osman StaffCaller: Unspecified (Today, 12:45 PM)Spoke with patient for tcc post-d/c call. Please contact and advise regarding patient stated questions and / or concerns. Thank you. (Pt requesting message to both providers)Patient voiced uncontrolled pain rated 7-8/10 exacerbated by activity, denies change or worsening since d/c. Pt states low appetite r/t distorted taste and hiccups. Bright red bleeding from bottom wound, denies soaking through dressing, denies other associated s/s.Pt voiced interest in HH to assist with wound carePlease do not reply to this message, as this inbox is not routinely monitored.

## 2025-03-05 ENCOUNTER — OFFICE VISIT (OUTPATIENT)
Dept: FAMILY MEDICINE | Facility: CLINIC | Age: 45
End: 2025-03-05
Payer: COMMERCIAL

## 2025-03-05 DIAGNOSIS — Z00.00 HEALTHCARE MAINTENANCE: Primary | ICD-10-CM

## 2025-03-05 DIAGNOSIS — R53.81 PHYSICAL DECONDITIONING: ICD-10-CM

## 2025-03-05 DIAGNOSIS — S31.109A OPEN WOUND OF ABDOMINAL WALL, INITIAL ENCOUNTER: ICD-10-CM

## 2025-03-05 DIAGNOSIS — N18.6 ESRD ON HEMODIALYSIS: ICD-10-CM

## 2025-03-05 DIAGNOSIS — I10 ESSENTIAL HYPERTENSION: ICD-10-CM

## 2025-03-05 DIAGNOSIS — R20.8 SKIN PAIN: ICD-10-CM

## 2025-03-05 DIAGNOSIS — Z99.2 ESRD ON HEMODIALYSIS: ICD-10-CM

## 2025-03-05 RX ORDER — LIDOCAINE AND PRILOCAINE 25; 25 MG/G; MG/G
CREAM TOPICAL
Qty: 60 G | Refills: 5 | Status: SHIPPED | OUTPATIENT
Start: 2025-03-05

## 2025-03-05 NOTE — PROGRESS NOTES
Established Patient - Audio Only Telehealth Visit       274}  The patient location is: [place]   The chief complaint leading to consultation is:   Chief Complaint   Patient presents with    Health Maintenance     Visit type: Virtual visit with audio only (telephone)  Visit type: Virtual visit with audio only (telephone)  Total time spent in medical discussion with patient: 15 minutes  Total time spent on date of the encounter:21 minutes     The reason for the audio only service rather than synchronous audio and video virtual visit was related to technical difficulties or patient preference/necessity.     Each patient to whom I provide medical services by telemedicine is:  (1) informed of the relationship between the physician and patient and the respective role of any other health care provider with respect to management of the patient; and (2) notified that they may decline to receive medical services by telemedicine and may withdraw from such care at any time. Patient verbally consented to receive this service via voice-only telephone call.     274}     HPI:   History of Present Illness     Patient reports that his wound is healing no fevers no heavy discharge discharge from the site he does need home health.  he also reports his blood pressure has been stable reports he needed some more lidocaine cream for the insertion site for dialysis days.       Lab Results   Component Value Date    WBC 10.61 02/27/2025    HGB 9.8 (L) 02/27/2025    HCT 29.7 (L) 02/27/2025    MCV 92 02/27/2025     02/27/2025     02/28/2025    K 4.0 02/28/2025    CL 94 (L) 02/28/2025    CALCIUM 8.9 02/28/2025    PHOS 10.7 (HH) 02/28/2025    CO2 23 02/28/2025    GLU 96 02/28/2025    BUN 56 (H) 02/28/2025    CREATININE 15.4 (H) 02/28/2025    EGFRNORACEVR 3.6 (A) 02/28/2025    ANIONGAP 19 (H) 02/28/2025    PROT 8.0 02/25/2025    ALBUMIN 3.6 02/25/2025    BILITOT 0.3 02/25/2025    ALKPHOS 53 02/25/2025    ALT 17 02/25/2025    AST 14  02/25/2025    INR 1.0 02/24/2025    CHOL 141 10/11/2022    TRIG 66 10/11/2022    HDL 39 (L) 10/11/2022    LDLCALC 88.8 10/11/2022    TSH 1.699 10/10/2022    PSA 0.48 06/25/2024    HGBA1C 6.1 07/16/2024          Assessment and plan:    Assessment & Plan     Abdominal wound will set up with HH based on hx no signs  of infection   Anemia-stable will get blood work and follow-up on this    Hypertension -stable continue current meds monitor no headache or chest pain f/u blood work     ESRD-stable continue dialysis monitor electrolytes and blood pressure. Recommend renal diet and follow up with nephrology.        João was seen today for health maintenance.    Diagnoses and all orders for this visit:    Healthcare maintenance    Open wound of abdominal wall, initial encounter  -     Ambulatory referral/consult to Home Health; Future    Physical deconditioning  -     Ambulatory referral/consult to Home Health; Future    Essential hypertension    ESRD on hemodialysis  -     Ambulatory referral/consult to Home Health; Future    Skin pain  -     LIDOcaine-prilocaine (EMLA) cream; Apply topically as needed (pre dialysis).       This service was not originating from a related E/M service provided within the previous 7 days nor will  to an E/M service or procedure within the next 24 hours or my soonest available appointment.  Prevailing standard of care was able to be met in this audio-only visit.

## 2025-03-06 ENCOUNTER — OFFICE VISIT (OUTPATIENT)
Dept: FAMILY MEDICINE | Facility: CLINIC | Age: 45
End: 2025-03-06
Payer: COMMERCIAL

## 2025-03-06 ENCOUNTER — TELEPHONE (OUTPATIENT)
Dept: FAMILY MEDICINE | Facility: CLINIC | Age: 45
End: 2025-03-06
Payer: COMMERCIAL

## 2025-03-06 VITALS
HEART RATE: 103 BPM | WEIGHT: 251.31 LBS | HEIGHT: 74 IN | RESPIRATION RATE: 16 BRPM | BODY MASS INDEX: 32.25 KG/M2 | DIASTOLIC BLOOD PRESSURE: 62 MMHG | SYSTOLIC BLOOD PRESSURE: 110 MMHG | TEMPERATURE: 98 F | OXYGEN SATURATION: 99 %

## 2025-03-06 DIAGNOSIS — I27.20 PULMONARY HYPERTENSION: ICD-10-CM

## 2025-03-06 DIAGNOSIS — I10 ESSENTIAL HYPERTENSION: ICD-10-CM

## 2025-03-06 DIAGNOSIS — D64.9 ANEMIA, UNSPECIFIED TYPE: ICD-10-CM

## 2025-03-06 DIAGNOSIS — Z00.00 HEALTHCARE MAINTENANCE: Primary | ICD-10-CM

## 2025-03-06 DIAGNOSIS — Z79.01 ANTICOAGULATED: ICD-10-CM

## 2025-03-06 DIAGNOSIS — S31.109A WOUND OF ABDOMEN: ICD-10-CM

## 2025-03-06 DIAGNOSIS — I10 HYPERTENSION, UNSPECIFIED TYPE: ICD-10-CM

## 2025-03-06 DIAGNOSIS — N18.6 ESRD (END STAGE RENAL DISEASE): ICD-10-CM

## 2025-03-06 DIAGNOSIS — I50.32 CHRONIC HEART FAILURE WITH PRESERVED EJECTION FRACTION: ICD-10-CM

## 2025-03-06 PROBLEM — E66.01 SEVERE OBESITY WITH BODY MASS INDEX (BMI) OF 35.0 TO 39.9 WITH COMORBIDITY: Status: RESOLVED | Noted: 2022-10-17 | Resolved: 2025-03-06

## 2025-03-06 PROCEDURE — 99999 PR PBB SHADOW E&M-EST. PATIENT-LVL IV: CPT | Mod: PBBFAC,,, | Performed by: STUDENT IN AN ORGANIZED HEALTH CARE EDUCATION/TRAINING PROGRAM

## 2025-03-06 RX ORDER — MUPIROCIN 20 MG/G
OINTMENT TOPICAL 3 TIMES DAILY
Qty: 30 G | Refills: 2 | Status: SHIPPED | OUTPATIENT
Start: 2025-03-06 | End: 2025-03-13

## 2025-03-06 RX ORDER — OLMESARTAN MEDOXOMIL 40 MG/1
40 TABLET ORAL DAILY
Qty: 90 TABLET | Refills: 0 | Status: SHIPPED | OUTPATIENT
Start: 2025-03-06 | End: 2025-06-04

## 2025-03-06 RX ORDER — MUPIROCIN 20 MG/G
OINTMENT TOPICAL 3 TIMES DAILY
Qty: 30 G | Refills: 2 | Status: SHIPPED | OUTPATIENT
Start: 2025-03-06 | End: 2025-03-06

## 2025-03-06 RX ORDER — ISOSORBIDE MONONITRATE 120 MG/1
120 TABLET, EXTENDED RELEASE ORAL EVERY MORNING
Qty: 90 TABLET | Refills: 1 | Status: SHIPPED | OUTPATIENT
Start: 2025-03-06 | End: 2025-03-06

## 2025-03-06 RX ORDER — ISOSORBIDE MONONITRATE 120 MG/1
120 TABLET, EXTENDED RELEASE ORAL EVERY MORNING
Qty: 90 TABLET | Refills: 1 | Status: SHIPPED | OUTPATIENT
Start: 2025-03-06

## 2025-03-06 NOTE — TELEPHONE ENCOUNTER
----- Message from Negrita sent at 3/6/2025 10:12 AM CST -----  Contact: self  Type:  Needs Medical AdviceWho Called: pt Symptoms (please be specific):  How long has patient had these symptoms:  Pharmacy name and phone #:  Would the patient rather a call back or a response via [facility]? callBest Call Back Number: [phone] Information: pt states he needs to speak with office   Please call back to advise. Thanks!

## 2025-03-06 NOTE — PROGRESS NOTES
[facility] Note    Subjective:    The HPI and pertinent ROS is included in the Diagnostic Impression Remarks section at the end of the note. Please see below for further details. Chief complaint is at end of note.     João is a pleasant intelligent patient who is here for evaluation.     Modified Medications    Modified Medication Previous Medication    ISOSORBIDE MONONITRATE (IMDUR) 120 MG 24 HR TABLET isosorbide mononitrate (IMDUR) 120 MG 24 hr tablet       Take 1 tablet (120 mg total) by mouth every morning.    Take 120 mg by mouth every morning.    OLMESARTAN (BENICAR) 40 MG TABLET olmesartan (BENICAR) 40 MG tablet       Take 1 tablet (40 mg total) by mouth once daily.    Take 1 tablet (40 mg total) by mouth once daily.       Data reviewed 274}  Previous medical records reviewed and summarized in plan section at end of note.      If you are due for any health screening(s) below please notify me so we can arrange them to be ordered and scheduled. Most healthy patients at your age complete them, but you are free to accept or refuse. If you can't do it, I'll definitely understand. If you can, I'd certainly appreciate it!     All of your core healthy metrics are met.      The following portions of the patient's history were reviewed and updated as appropriate: allergies, current medications, past family history, past medical history, past social history, past surgical history and problem list.    He  has a past medical history of Allergy, Anemia, Anxiety, CHF (congestive heart failure), Depression, Dialysis patient, High blood pressure with chronic kidney disease, stage 5 chronic kidney disease or end stage renal disease, Hypertension, and Sleep apnea.  He  has a past surgical history that includes Hernia repair (Right); insertion, catheter, tunneled (N/A, 6/22/2023); Fistulogram (Bilateral, 4/30/2024); Percutaneous transluminal angioplasty of arteriovenous fistula (Left, 4/30/2024); Phlebography  (N/A, 7/19/2024); removal, tunneled cath (Right, 7/19/2024); Robot-assisted laparoscopic resection of sigmoid colon using da Zoey Xi (N/A, 9/17/2024); Abscess drainage (Left, 9/17/2024); laparotomy, exploratory (N/A, 9/25/2024); colon resection (9/25/2024); Mobilization of splenic flexure (9/25/2024); Fistulogram (Left, 9/24/2024); Percutaneous transluminal angioplasty of arteriovenous fistula (Left, 9/24/2024); Colonoscopy (N/A, 2/17/2025); Revision Colostomy (N/A, 2/24/2025); Thrombectomy of hemodialysis access site (N/A, 2/27/2025); and Percutaneous transluminal angioplasty of arteriovenous fistula (N/A, 2/27/2025).    He  reports that he has never smoked. He has never been exposed to tobacco smoke. He has never used smokeless tobacco. He reports that he does not drink alcohol and does not use drugs.  He family history includes Hypertension in his mother.    Review of patient's allergies indicates:   Allergen Reactions    Mushroom Swelling     Tongue swells    Tongue swells       Tongue swells       Tobacco Use: Low Risk  (3/6/2025)    Patient History     Smoking Tobacco Use: Never     Smokeless Tobacco Use: Never     Passive Exposure: Never     Physical Examination  Physical Exam  Vital Signs  Blood pressure is 110/62. Heart rate is 103.     General appearance: alert, cooperative, no distress  Neck: no thyromegaly, no neck stiffness  Lungs: clear to auscultation, no wheezes, rales or rhonchi, symmetric air entry  Heart: normal rate, regular rhythm, normal S1, S2, no murmurs, rubs, clicks or gallops  Abdomen: soft,wound healing nosigns of infection   Back: no point tenderness over spine  Extremities: peripheral pulses normal, no unilateral leg swelling or calf tenderness   Neurological:alert, oriented, normal speech, no new focal findings or movement disorder noted from baseline      BP Readings from Last 3 Encounters:   03/06/25 110/62   02/28/25 104/74   02/17/25 (!) 176/94     Wt Readings from Last 3  "Encounters:   03/06/25 114 kg (251 lb 5.2 oz)   02/28/25 114.6 kg (252 lb 10.4 oz)   02/17/25 114.3 kg (252 lb)     /62 (BP Location: Right arm, Patient Position: Sitting)   Pulse 103   Temp 98.2 °F (36.8 °C) (Oral)   Resp 16   Ht 6' 2" (1.88 m)   Wt 114 kg (251 lb 5.2 oz)   SpO2 99%   BMI 32.27 kg/m²    274}  Laboratory: I have reviewed old labs below:    274}    Lab Results   Component Value Date    WBC 10.61 02/27/2025    HGB 9.8 (L) 02/27/2025    HCT 29.7 (L) 02/27/2025    MCV 92 02/27/2025     02/27/2025     02/28/2025    K 4.0 02/28/2025    CL 94 (L) 02/28/2025    CALCIUM 8.9 02/28/2025    PHOS 10.7 (HH) 02/28/2025    CO2 23 02/28/2025    GLU 96 02/28/2025    BUN 56 (H) 02/28/2025    CREATININE 15.4 (H) 02/28/2025    EGFRNORACEVR 3.6 (A) 02/28/2025    ANIONGAP 19 (H) 02/28/2025    PROT 8.0 02/25/2025    ALBUMIN 3.6 02/25/2025    BILITOT 0.3 02/25/2025    ALKPHOS 53 02/25/2025    ALT 17 02/25/2025    AST 14 02/25/2025    INR 1.0 02/24/2025    CHOL 141 10/11/2022    TRIG 66 10/11/2022    HDL 39 (L) 10/11/2022    LDLCALC 88.8 10/11/2022    TSH 1.699 10/10/2022    PSA 0.48 06/25/2024    HGBA1C 6.1 07/16/2024      Lab reviewed by me: Particular labs of significance that I will monitor, workup, or treat to improve are mentioned below in diagnostic impression remarks.    Results         Imaging/EKG: I have reviewed the pertinent results and my findings are noted in remarks.  274}    CC:   Chief Complaint   Patient presents with    Hospital Follow Up    Dressing Change    Pain        274}    History of Present Illness  The patient is a 44-year-old male who presents for evaluation of post-surgical bleeding.    He reports persistent drainage from his surgical site, which he has been managing with home health care. He experiences pain during coughing episodes, reminiscent of his previous colostomy surgery. He has been independently changing his bandages at home, noting minimal bleeding from the " stoma site. However, he observes blood pooling around the base of the bandage covering the midline incision. He did not take any pictures of the wound. He suspects that the resumption of Eliquis may have contributed to this bleeding. He recalls that his fistula ceased to function the day after his colostomy, necessitating another surgery. He expresses concern about discontinuing Eliquis due to these past experiences. He also mentions that his grandmother assisted with bandage changes but avoided the area with the drain line. He believes the pain is beginning to subside and attributes it to abdominal strain, particularly when coughing or sneezing. He anticipates that the pain will resolve within 1 to 2 weeks, as it did following his previous surgery. He recalls that his wound almost fully closed within a week of removing the vacuum device post-surgery. He has not yet received his prescription for EMLA cream or lidocaine. He reports that his current bandages become saturated within 24 hours and expresses a need for more absorbent or waterproof options. He does not have any pads for absorption.    MEDICATIONS  Current: Eliquis       Assessment/Plan  João Ham is a 44 y.o. male who presents to clinic with:  1. Healthcare maintenance    2. Essential hypertension    3. ESRD (end stage renal disease)    4. Anemia, unspecified type    5. Pulmonary hypertension    6. Anticoagulated    7. Chronic heart failure with preserved ejection fraction    8. Wound of abdomen    9. Hypertension, unspecified type       274}    Assessment & Plan  1. Post-surgical wound.  The wound does not appear to be infected. He has some very mild  bleeding is intermittent not currently.  Blood pressure is normal at 110/62. He is advised to continue using mupirocin ointment to create a protective barrier over the wound. A prescription for mupirocin ointment will be sent to his pharmacy. He should continue taking Eliquis unless the bleeding becomes  persistent.  Additional bandages will be provided for wound care. He is instructed to report any symptoms such as thick discharge, fever, or worsening pain.      Abdominal wound healing no signs of infection monitor has hh order wound care   Pulm htn stable continue meds   ESRD-stable continue dialysis monitor electrolytes and blood pressure. Recommend renal diet and follow up with nephrology.    CHF-stable continue current meds euvolemic no dyspnea monitor  Hypertension -stable continue current meds monitor no headache or chest pain f/u blood work   Anemia-stable will get blood work and follow-up on this      This note was generated with the assistance of ambient listening technology. Verbal consent was obtained by the patient and accompanying visitor(s) for the recording of patient appointment to facilitate this note. I attest to having reviewed and edited the generated note for accuracy, though some syntax or spelling errors may persist. Please contact the author of this note for any clarification.      1. Healthcare maintenance    2. Essential hypertension    3. ESRD (end stage renal disease)  - olmesartan (BENICAR) 40 MG tablet; Take 1 tablet (40 mg total) by mouth once daily.  Dispense: 90 tablet; Refill: 0    4. Anemia, unspecified type    5. Pulmonary hypertension    6. Anticoagulated    7. Chronic heart failure with preserved ejection fraction    8. Wound of abdomen  - mupirocin (BACTROBAN) 2 % ointment; Apply topically 3 (three) times daily. for 7 days  Dispense: 30 g; Refill: 2    9. Hypertension, unspecified type  - isosorbide mononitrate (IMDUR) 120 MG 24 hr tablet; Take 1 tablet (120 mg total) by mouth every morning.  Dispense: 90 tablet; Refill: 1  - olmesartan (BENICAR) 40 MG tablet; Take 1 tablet (40 mg total) by mouth once daily.  Dispense: 90 tablet; Refill: 0      Dawson Granado MD   274}    If you are due for any health screening(s) below please notify me so we can arrange them to be ordered  and scheduled. Most healthy patients at your age complete them, but you are free to accept or refuse.     If you can't do it, I'll definitely understand. If you can, I'd certainly appreciate it!   All of your core healthy metrics are met.

## 2025-03-10 ENCOUNTER — TELEPHONE (OUTPATIENT)
Dept: SURGERY | Facility: CLINIC | Age: 45
End: 2025-03-10
Payer: COMMERCIAL

## 2025-03-10 NOTE — TELEPHONE ENCOUNTER
I called patient to ask him to come in @ 1:30pm tomorrow instead of 1pm due to Dr. Connors having a meeting.  Patient agreed.  Juan

## 2025-03-11 ENCOUNTER — LAB VISIT (OUTPATIENT)
Dept: LAB | Facility: HOSPITAL | Age: 45
End: 2025-03-11
Payer: COMMERCIAL

## 2025-03-11 ENCOUNTER — OFFICE VISIT (OUTPATIENT)
Dept: SURGERY | Facility: CLINIC | Age: 45
End: 2025-03-11
Payer: COMMERCIAL

## 2025-03-11 VITALS
RESPIRATION RATE: 16 BRPM | SYSTOLIC BLOOD PRESSURE: 134 MMHG | HEART RATE: 105 BPM | BODY MASS INDEX: 32.12 KG/M2 | TEMPERATURE: 99 F | HEIGHT: 74 IN | DIASTOLIC BLOOD PRESSURE: 89 MMHG | WEIGHT: 250.25 LBS

## 2025-03-11 DIAGNOSIS — Z76.82 ORGAN TRANSPLANT CANDIDATE: ICD-10-CM

## 2025-03-11 DIAGNOSIS — Z09 POSTOP CHECK: Primary | ICD-10-CM

## 2025-03-11 PROCEDURE — 99999 PR PBB SHADOW E&M-EST. PATIENT-LVL IV: CPT | Mod: PBBFAC,TXP,, | Performed by: SURGERY

## 2025-03-11 PROCEDURE — 4010F ACE/ARB THERAPY RXD/TAKEN: CPT | Mod: CPTII,NTX,S$GLB, | Performed by: SURGERY

## 2025-03-11 PROCEDURE — 3075F SYST BP GE 130 - 139MM HG: CPT | Mod: CPTII,NTX,S$GLB, | Performed by: SURGERY

## 2025-03-11 PROCEDURE — 1159F MED LIST DOCD IN RCRD: CPT | Mod: CPTII,NTX,S$GLB, | Performed by: SURGERY

## 2025-03-11 PROCEDURE — 3066F NEPHROPATHY DOC TX: CPT | Mod: CPTII,NTX,S$GLB, | Performed by: SURGERY

## 2025-03-11 PROCEDURE — 3079F DIAST BP 80-89 MM HG: CPT | Mod: CPTII,NTX,S$GLB, | Performed by: SURGERY

## 2025-03-11 PROCEDURE — 99024 POSTOP FOLLOW-UP VISIT: CPT | Mod: NTX,S$GLB,, | Performed by: SURGERY

## 2025-03-11 NOTE — PROGRESS NOTES
"    Cc: post op    HPI: 44 y.o.  male  2 weeks s/p colostomy reversal .   Pt doing well.  Notes some pain.  Good bowel function.  No n/v.  Appetite slowly improving.      PE: AFVSS    AAOx3  CTA  Soft/NT/nd  Inc: c/d/i        Path:   1. RECTUM WITH PROXIMAL AND DISTAL DONUTS, PARTIAL RESECTION:     --RECTAL TISSUE WITH FIBROUS ADHESIONS AND PATCHY HEMORRHAGIC CHANGES,      NEGATIVE FOR DYSPLASIA OR MALIGNANCY.     --MARGINS VIABLE.     2. APPENDIX, APPENDECTOMY:     --APPENDIX WITH NO SIGNIFICANT HISTOPATHOLOGIC ABNORMALITY.     3. COLON, PARTIAL RESECTION:     --SQUAMOENTERIC TISSUE WITH CHRONIC CHANGES, CONSISTENT WITH OSTOMY      SITE.     --NEGATIVE FOR DYSPLASIA OR MALIGNANCY.     4. LABELED AS "OMENTUM MASS," PARTIAL RESECTION:     --OMENTUM WITH FAT NECROSIS, FIBROSIS, AND FOCAL HEMORRHAGIC CHANGES.     --NEGATIVE FOR MALIGNANCY._____         A/P:   Pt doing well post surgery.   Drain removed.    Staples removed.     F/U  with me in 6 weeks.    "

## 2025-03-12 ENCOUNTER — TELEPHONE (OUTPATIENT)
Dept: TRANSPLANT | Facility: CLINIC | Age: 45
End: 2025-03-12
Payer: COMMERCIAL

## 2025-03-13 LAB — HPRA INTERPRETATION: NORMAL

## 2025-03-14 ENCOUNTER — PATIENT MESSAGE (OUTPATIENT)
Dept: SURGERY | Facility: CLINIC | Age: 45
End: 2025-03-14
Payer: COMMERCIAL

## 2025-03-20 ENCOUNTER — OFFICE VISIT (OUTPATIENT)
Dept: FAMILY MEDICINE | Facility: CLINIC | Age: 45
End: 2025-03-20
Payer: COMMERCIAL

## 2025-03-20 VITALS
TEMPERATURE: 99 F | OXYGEN SATURATION: 99 % | DIASTOLIC BLOOD PRESSURE: 90 MMHG | HEIGHT: 74 IN | RESPIRATION RATE: 16 BRPM | BODY MASS INDEX: 31.97 KG/M2 | HEART RATE: 100 BPM | SYSTOLIC BLOOD PRESSURE: 130 MMHG | WEIGHT: 249.13 LBS

## 2025-03-20 DIAGNOSIS — I10 ESSENTIAL HYPERTENSION: ICD-10-CM

## 2025-03-20 DIAGNOSIS — I50.32 CHRONIC HEART FAILURE WITH PRESERVED EJECTION FRACTION: ICD-10-CM

## 2025-03-20 DIAGNOSIS — N18.6 ESRD ON HEMODIALYSIS: ICD-10-CM

## 2025-03-20 DIAGNOSIS — R79.9 ABNORMAL FINDING OF BLOOD CHEMISTRY, UNSPECIFIED: ICD-10-CM

## 2025-03-20 DIAGNOSIS — Z00.00 HEALTHCARE MAINTENANCE: Primary | ICD-10-CM

## 2025-03-20 DIAGNOSIS — D64.9 ANEMIA, UNSPECIFIED TYPE: ICD-10-CM

## 2025-03-20 DIAGNOSIS — Z99.2 ESRD ON HEMODIALYSIS: ICD-10-CM

## 2025-03-20 PROCEDURE — 99999 PR PBB SHADOW E&M-EST. PATIENT-LVL IV: CPT | Mod: PBBFAC,,, | Performed by: STUDENT IN AN ORGANIZED HEALTH CARE EDUCATION/TRAINING PROGRAM

## 2025-03-20 NOTE — PROGRESS NOTES
[facility] Note    Subjective:    The HPI and pertinent ROS is included in the Diagnostic Impression Remarks section at the end of the note. Please see below for further details. Chief complaint is at end of note.     João is a pleasant intelligent patient who is here for evaluation.     Modified Medications    No medications on file       Data reviewed 274}  Previous medical records reviewed and summarized in plan section at end of note.      If you are due for any health screening(s) below please notify me so we can arrange them to be ordered and scheduled. Most healthy patients at your age complete them, but you are free to accept or refuse. If you can't do it, I'll definitely understand. If you can, I'd certainly appreciate it!     All of your core healthy metrics are met.      The following portions of the patient's history were reviewed and updated as appropriate: allergies, current medications, past family history, past medical history, past social history, past surgical history and problem list.    He  has a past medical history of Allergy, Anemia, Anxiety, CHF (congestive heart failure), Depression, Dialysis patient, High blood pressure with chronic kidney disease, stage 5 chronic kidney disease or end stage renal disease, Hypertension, and Sleep apnea.  He  has a past surgical history that includes Hernia repair (Right); insertion, catheter, tunneled (N/A, 6/22/2023); Fistulogram (Bilateral, 4/30/2024); Percutaneous transluminal angioplasty of arteriovenous fistula (Left, 4/30/2024); Phlebography (N/A, 7/19/2024); removal, tunneled cath (Right, 7/19/2024); Robot-assisted laparoscopic resection of sigmoid colon using da Zoey Xi (N/A, 9/17/2024); Abscess drainage (Left, 9/17/2024); laparotomy, exploratory (N/A, 9/25/2024); colon resection (9/25/2024); Mobilization of splenic flexure (9/25/2024); Fistulogram (Left, 9/24/2024); Percutaneous transluminal angioplasty of arteriovenous fistula  "(Left, 9/24/2024); Colonoscopy (N/A, 2/17/2025); Revision Colostomy (N/A, 2/24/2025); Thrombectomy of hemodialysis access site (N/A, 2/27/2025); and Percutaneous transluminal angioplasty of arteriovenous fistula (N/A, 2/27/2025).    He  reports that he has never smoked. He has never been exposed to tobacco smoke. He has never used smokeless tobacco. He reports that he does not drink alcohol and does not use drugs.  He family history includes Hypertension in his mother.    Review of patient's allergies indicates:   Allergen Reactions    Mushroom Swelling     Tongue swells    Tongue swells       Tongue swells       Tobacco Use: Low Risk  (3/20/2025)    Patient History     Smoking Tobacco Use: Never     Smokeless Tobacco Use: Never     Passive Exposure: Never     Physical Examination  Physical Exam  Vital Signs  Blood pressure is 130/90. Heart rate is 100.     General appearance: alert, cooperative, no distress  Neck: no thyromegaly, no neck stiffness  Lungs: clear to auscultation, no wheezes, rales or rhonchi, symmetric air entry  Heart: normal rate, regular rhythm, normal S1, S2, no murmurs, rubs, clicks or gallops  Abdomen: soft, nontender, nondistended, no rigidity, rebound, or guarding. Healing wound   Back: no point tenderness over spine  Extremities: peripheral pulses normal, no unilateral leg swelling or calf tenderness   Neurological:alert, oriented, normal speech, no new focal findings or movement disorder noted from baseline      BP Readings from Last 3 Encounters:   03/20/25 (!) 130/90   03/11/25 134/89   03/06/25 110/62     Wt Readings from Last 3 Encounters:   03/20/25 113 kg (249 lb 1.9 oz)   03/11/25 113.5 kg (250 lb 3.6 oz)   03/06/25 114 kg (251 lb 5.2 oz)     BP (!) 130/90 (BP Location: Right arm, Patient Position: Sitting)   Pulse 100   Temp 98.7 °F (37.1 °C) (Oral)   Resp 16   Ht 6' 2" (1.88 m)   Wt 113 kg (249 lb 1.9 oz)   SpO2 99%   BMI 31.99 kg/m²    274}  Laboratory: I have reviewed old " labs below:    274}    Lab Results   Component Value Date    WBC 10.61 02/27/2025    HGB 9.8 (L) 02/27/2025    HCT 29.7 (L) 02/27/2025    MCV 92 02/27/2025     02/27/2025     02/28/2025    K 4.0 02/28/2025    CL 94 (L) 02/28/2025    CALCIUM 8.9 02/28/2025    PHOS 10.7 (HH) 02/28/2025    CO2 23 02/28/2025    GLU 96 02/28/2025    BUN 56 (H) 02/28/2025    CREATININE 15.4 (H) 02/28/2025    EGFRNORACEVR 3.6 (A) 02/28/2025    ANIONGAP 19 (H) 02/28/2025    PROT 8.0 02/25/2025    ALBUMIN 3.6 02/25/2025    BILITOT 0.3 02/25/2025    ALKPHOS 53 02/25/2025    ALT 17 02/25/2025    AST 14 02/25/2025    INR 1.0 02/24/2025    CHOL 141 10/11/2022    TRIG 66 10/11/2022    HDL 39 (L) 10/11/2022    LDLCALC 88.8 10/11/2022    TSH 1.699 10/10/2022    PSA 0.48 06/25/2024    HGBA1C 6.1 07/16/2024      Lab reviewed by me: Particular labs of significance that I will monitor, workup, or treat to improve are mentioned below in diagnostic impression remarks.    Results  Laboratory Studies  Phosphorus level is down to 5.       Imaging/EKG: I have reviewed the pertinent results and my findings are noted in remarks.  274}    CC:   Chief Complaint   Patient presents with    Follow-up    Hypertension        274}    History of Present Illness  The patient is a 44-year-old male here for follow-up.    He reports a satisfactory week with persistent pain that has shown improvement. His sleep quality remains unaffected, and he retains full mobility. He estimates his strength to be at 98 percent post-surgery. He had a consultation with Dr. Connors on 03/11/2025, during which the drain was removed. The abdominal site is nearly healed, but he expresses concern about the bone. He notes an increase in granulation tissue size, which subsequently drained and returned to its previous size. There is no evidence of infection. He reports no further bleeding from the wound but mentions slight discharge from all three sites. His appetite remains poor,  consistent with his hospital stay, and he consumes two meals per day. 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    He has been managing his blood pressure at home, which has been challenging due to medication adjustments required on dialysis days. His heart rate at home is not typically elevated. He continues to take hydralazine, isosorbide, and olmesartan.    He undergoes dialysis three times a week and receives iron supplementation during these sessions. His phosphorus levels have decreased to 5, as per the last check conducted on the previous Tuesday. He reports no leg cramps.    MEDICATIONS  Hydralazine, isosorbide, olmesartan       Assessment/Plan  João Ham is a 44 y.o. male who presents to clinic with:  1. Healthcare maintenance    2. Essential hypertension    3. ESRD on hemodialysis    4. Anemia, unspecified type    5. Chronic heart failure with preserved ejection fraction    6. Abnormal finding of blood chemistry, unspecified       274}    Assessment & Plan  1. Abdominal wound.  The wound exhibits granulation tissue without signs of infection. The wound should be monitored, and it is recommended to keep it covered to aid in healing.    2. Hypertension.  His hypertension is slightly elevated with a blood pressure reading of 130/90. He should continue his current medications, including hydralazine, isosorbide, and olmesartan, and monitor his blood pressure regularly.    3. Tachycardia.  He reports no chest pain or shortness of breath. He is currently on anticoagulation therapy. A TSH test will be conducted to rule out thyroid-related causes for the elevated heart rate. Blood work will be checked to monitor his condition.    4. End-stage renal disease (ESRD).  His ESRD is stable. He should continue with dialysis sessions three times a week and monitor his condition.    PROCEDURE  The drain was removed from the abdominal site on 03/11/2025.      CHF-stable continue current meds euvolemic no dyspnea  monitor  Anemia-stable will get blood work and follow-up on this      This note was generated with the assistance of ambient listening technology. Verbal consent was obtained by the patient and accompanying visitor(s) for the recording of patient appointment to facilitate this note. I attest to having reviewed and edited the generated note for accuracy, though some syntax or spelling errors may persist. Please contact the author of this note for any clarification.      1. Healthcare maintenance    2. Essential hypertension  - Uric Acid; Future    3. ESRD on hemodialysis  - Lipid Panel; Future  - Microalbumin/Creatinine Ratio, Urine; Future  - TSH; Future  - Phosphorus; Future    4. Anemia, unspecified type    5. Chronic heart failure with preserved ejection fraction  - Lipid Panel; Future  - Microalbumin/Creatinine Ratio, Urine; Future  - TSH; Future  - Phosphorus; Future    6. Abnormal finding of blood chemistry, unspecified  - Lipid Panel; Future  - Microalbumin/Creatinine Ratio, Urine; Future  - TSH; Future  - Phosphorus; Future  - Uric Acid; Future      Dawson Granado MD   274}    If you are due for any health screening(s) below please notify me so we can arrange them to be ordered and scheduled. Most healthy patients at your age complete them, but you are free to accept or refuse.     If you can't do it, I'll definitely understand. If you can, I'd certainly appreciate it!   All of your core healthy metrics are met.

## 2025-03-26 DIAGNOSIS — Z76.82 ORGAN TRANSPLANT CANDIDATE: ICD-10-CM

## 2025-03-26 DIAGNOSIS — N18.6 END STAGE RENAL DISEASE: Primary | ICD-10-CM

## 2025-03-26 NOTE — PROGRESS NOTES
YEARLY LIST MANAGEMENT NOTE    João Ham's kidney transplant listing status reviewed.  Patient is due for follow-up appointments in June 2025..  Appointments will be scheduled per protocol.  HLA sample is up to date and being received on a regular basis.

## 2025-03-27 ENCOUNTER — CLINICAL SUPPORT (OUTPATIENT)
Dept: FAMILY MEDICINE | Facility: CLINIC | Age: 45
End: 2025-03-27
Payer: COMMERCIAL

## 2025-03-27 ENCOUNTER — LAB VISIT (OUTPATIENT)
Dept: LAB | Facility: HOSPITAL | Age: 45
End: 2025-03-27
Attending: STUDENT IN AN ORGANIZED HEALTH CARE EDUCATION/TRAINING PROGRAM
Payer: COMMERCIAL

## 2025-03-27 VITALS
RESPIRATION RATE: 17 BRPM | DIASTOLIC BLOOD PRESSURE: 110 MMHG | HEART RATE: 86 BPM | SYSTOLIC BLOOD PRESSURE: 158 MMHG | OXYGEN SATURATION: 97 %

## 2025-03-27 DIAGNOSIS — N18.6 ESRD ON HEMODIALYSIS: ICD-10-CM

## 2025-03-27 DIAGNOSIS — I10 ESSENTIAL HYPERTENSION: Primary | ICD-10-CM

## 2025-03-27 DIAGNOSIS — I50.32 CHRONIC HEART FAILURE WITH PRESERVED EJECTION FRACTION: ICD-10-CM

## 2025-03-27 DIAGNOSIS — I10 ESSENTIAL HYPERTENSION: ICD-10-CM

## 2025-03-27 DIAGNOSIS — Z99.2 ESRD ON HEMODIALYSIS: ICD-10-CM

## 2025-03-27 DIAGNOSIS — R79.9 ABNORMAL FINDING OF BLOOD CHEMISTRY, UNSPECIFIED: ICD-10-CM

## 2025-03-27 PROCEDURE — 99999 PR PBB SHADOW E&M-EST. PATIENT-LVL III: CPT | Mod: PBBFAC,,,

## 2025-03-27 PROCEDURE — 84100 ASSAY OF PHOSPHORUS: CPT | Mod: TXP

## 2025-03-27 PROCEDURE — 84550 ASSAY OF BLOOD/URIC ACID: CPT | Mod: TXP

## 2025-03-27 PROCEDURE — 36415 COLL VENOUS BLD VENIPUNCTURE: CPT | Mod: PO,TXP

## 2025-03-27 PROCEDURE — 80061 LIPID PANEL: CPT | Mod: TXP

## 2025-03-27 NOTE — PROGRESS NOTES
João Ham 44 y.o. male is here today for Blood Pressure check.   History of HTN yes.    Review of patient's allergies indicates:   Allergen Reactions    Mushroom Swelling     Tongue swells    Tongue swells       Tongue swells     Creatinine   Date Value Ref Range Status   02/28/2025 15.4 (H) 0.5 - 1.4 mg/dL Final     Sodium   Date Value Ref Range Status   02/28/2025 136 136 - 145 mmol/L Final     Potassium   Date Value Ref Range Status   02/28/2025 4.0 3.5 - 5.1 mmol/L Final   ]  Patient verifies taking blood pressure medications on a regular basis at the same time of the day.   Current Medications[1]  Does patient have record of home blood pressure readings no. Readings have been averaging N/A.   Last dose of blood pressure medication was taken at 3/26/2025 @ 9am.  Patient is asymptomatic.   Complains of N/A.    BP: (!) 150/98 , Pulse: 87 .    Blood pressure reading after 15 minutes was 158/110, Pulse 86.  Dr. Granado notified.         [1]   Current Outpatient Medications:     apixaban (ELIQUIS) 2.5 mg Tab, Take 1 tablet (2.5 mg total) by mouth 2 (two) times daily., Disp: , Rfl:     calcitRIOL (ROCALTROL) 0.25 MCG Cap, Take 1 capsule (0.25 mcg total) by mouth once daily., Disp: , Rfl:     cholecalciferol, vitamin D3, 125 mcg (5,000 unit) Tab, Take 5,000 Units by mouth every morning., Disp: , Rfl:     cloNIDine (CATAPRES) 0.3 MG tablet, Take 1 tablet (0.3 mg total) by mouth 3 (three) times daily as needed (SBP > 150)., Disp: , Rfl:     epoetin mily-epbx (RETACRIT) 20,000 unit/mL injection, Inject 0.67 mLs (13,400 Units total) into the skin every Mon, Wed, Fri., Disp: , Rfl:     heparin sodium,porcine (HEPARIN, PORCINE,) 1,000 unit/mL injection, Inject 4 mLs (4,000 Units total) into the vein as needed (line care after dialysis)., Disp: , Rfl:     heparin sodium,porcine (HEPARIN, PORCINE,) 5,000 unit/mL injection, Inject 1 mL (5,000 Units total) into the skin every 8 (eight) hours., Disp: , Rfl:     hydrALAZINE  (APRESOLINE) 100 MG tablet, Take 1 tablet (100 mg total) by mouth every 8 (eight) hours. Hold for SBP < 120 and before dialysis, Disp: 270 tablet, Rfl: 0    isosorbide mononitrate (IMDUR) 120 MG 24 hr tablet, Take 1 tablet (120 mg total) by mouth every morning., Disp: 90 tablet, Rfl: 1    LIDOcaine-prilocaine (EMLA) cream, Apply topically as needed (pre dialysis)., Disp: 60 g, Rfl: 5    metoprolol succinate (TOPROL-XL) 50 MG 24 hr tablet, Take 150 mg by mouth once daily., Disp: , Rfl:     olmesartan (BENICAR) 40 MG tablet, Take 1 tablet (40 mg total) by mouth once daily., Disp: 90 tablet, Rfl: 0    sevelamer carbonate (RENVELA) 2.4 gram PwPk, Take by mouth., Disp: , Rfl:

## 2025-03-28 ENCOUNTER — RESULTS FOLLOW-UP (OUTPATIENT)
Dept: FAMILY MEDICINE | Facility: CLINIC | Age: 45
End: 2025-03-28

## 2025-03-28 LAB
CHOLEST SERPL-MCNC: 205 MG/DL (ref 120–199)
CHOLEST/HDLC SERPL: 4.8 {RATIO} (ref 2–5)
HDLC SERPL-MCNC: 43 MG/DL (ref 40–75)
HDLC SERPL: 21 % (ref 20–50)
LDLC SERPL CALC-MCNC: 135 MG/DL (ref 63–159)
NONHDLC SERPL-MCNC: 162 MG/DL
PHOSPHATE SERPL-MCNC: 5.7 MG/DL (ref 2.7–4.5)
TRIGL SERPL-MCNC: 135 MG/DL (ref 30–150)
URATE SERPL-MCNC: 4.7 MG/DL (ref 3.4–7)

## 2025-04-02 PROCEDURE — 36415 COLL VENOUS BLD VENIPUNCTURE: CPT | Mod: TXP

## 2025-04-07 ENCOUNTER — TELEPHONE (OUTPATIENT)
Dept: TRANSPLANT | Facility: CLINIC | Age: 45
End: 2025-04-07
Payer: COMMERCIAL

## 2025-04-11 ENCOUNTER — LAB VISIT (OUTPATIENT)
Dept: LAB | Facility: HOSPITAL | Age: 45
End: 2025-04-11
Payer: COMMERCIAL

## 2025-04-11 DIAGNOSIS — Z76.82 ORGAN TRANSPLANT CANDIDATE: ICD-10-CM

## 2025-04-22 ENCOUNTER — OFFICE VISIT (OUTPATIENT)
Dept: SURGERY | Facility: CLINIC | Age: 45
End: 2025-04-22
Payer: COMMERCIAL

## 2025-04-22 VITALS
BODY MASS INDEX: 32.03 KG/M2 | DIASTOLIC BLOOD PRESSURE: 92 MMHG | SYSTOLIC BLOOD PRESSURE: 133 MMHG | HEIGHT: 74 IN | TEMPERATURE: 99 F | WEIGHT: 249.56 LBS | HEART RATE: 117 BPM

## 2025-04-22 DIAGNOSIS — Z09 POSTOP CHECK: Primary | ICD-10-CM

## 2025-04-22 PROCEDURE — 3080F DIAST BP >= 90 MM HG: CPT | Mod: CPTII,NTX,S$GLB, | Performed by: SURGERY

## 2025-04-22 PROCEDURE — 3075F SYST BP GE 130 - 139MM HG: CPT | Mod: CPTII,NTX,S$GLB, | Performed by: SURGERY

## 2025-04-22 PROCEDURE — 99999 PR PBB SHADOW E&M-EST. PATIENT-LVL IV: CPT | Mod: PBBFAC,TXP,, | Performed by: SURGERY

## 2025-04-22 PROCEDURE — 1159F MED LIST DOCD IN RCRD: CPT | Mod: CPTII,NTX,S$GLB, | Performed by: SURGERY

## 2025-04-22 PROCEDURE — 4010F ACE/ARB THERAPY RXD/TAKEN: CPT | Mod: CPTII,NTX,S$GLB, | Performed by: SURGERY

## 2025-04-22 PROCEDURE — 99024 POSTOP FOLLOW-UP VISIT: CPT | Mod: NTX,S$GLB,, | Performed by: SURGERY

## 2025-04-22 PROCEDURE — 3066F NEPHROPATHY DOC TX: CPT | Mod: CPTII,NTX,S$GLB, | Performed by: SURGERY

## 2025-04-22 NOTE — PROGRESS NOTES
Cc: post op    HPI: 44 y.o.  male  9 weeks s/p colostomy reversal.   Pt doing well.  BOwels working well  Notes he is feeling much better      PE: AFVSS    AAOx3  CTA  Soft/NT/nd  Inc: c/d/i            A/P:   Pt doing well post surgery.   F/U with me prn

## 2025-05-05 PROCEDURE — 86832 HLA CLASS I HIGH DEFIN QUAL: CPT | Mod: TXP | Performed by: NURSE PRACTITIONER

## 2025-05-05 PROCEDURE — 86833 HLA CLASS II HIGH DEFIN QUAL: CPT | Mod: TXP | Performed by: NURSE PRACTITIONER

## 2025-05-05 PROCEDURE — 36415 COLL VENOUS BLD VENIPUNCTURE: CPT | Mod: TXP

## 2025-05-12 ENCOUNTER — LAB VISIT (OUTPATIENT)
Dept: LAB | Facility: HOSPITAL | Age: 45
End: 2025-05-12
Payer: COMMERCIAL

## 2025-05-12 DIAGNOSIS — Z76.82 ORGAN TRANSPLANT CANDIDATE: ICD-10-CM

## 2025-05-13 LAB — HPRA INTERPRETATION: NORMAL

## 2025-06-03 ENCOUNTER — TELEPHONE (OUTPATIENT)
Dept: TRANSPLANT | Facility: CLINIC | Age: 45
End: 2025-06-03
Payer: COMMERCIAL

## 2025-06-04 ENCOUNTER — TELEPHONE (OUTPATIENT)
Dept: TRANSPLANT | Facility: CLINIC | Age: 45
End: 2025-06-04
Payer: COMMERCIAL

## 2025-06-17 ENCOUNTER — HOSPITAL ENCOUNTER (OUTPATIENT)
Dept: CARDIOLOGY | Facility: HOSPITAL | Age: 45
Discharge: HOME | End: 2025-06-17
Attending: NURSE PRACTITIONER
Payer: COMMERCIAL

## 2025-06-17 ENCOUNTER — HOSPITAL ENCOUNTER (OUTPATIENT)
Dept: RADIOLOGY | Facility: HOSPITAL | Age: 45
Discharge: HOME | End: 2025-06-17
Attending: NURSE PRACTITIONER
Payer: COMMERCIAL

## 2025-06-17 ENCOUNTER — OFFICE VISIT (OUTPATIENT)
Dept: TRANSPLANT | Facility: CLINIC | Age: 45
End: 2025-06-17
Payer: COMMERCIAL

## 2025-06-17 VITALS
TEMPERATURE: 97 F | OXYGEN SATURATION: 97 % | BODY MASS INDEX: 31.72 KG/M2 | HEIGHT: 74 IN | WEIGHT: 247.13 LBS | HEART RATE: 90 BPM | DIASTOLIC BLOOD PRESSURE: 88 MMHG | SYSTOLIC BLOOD PRESSURE: 123 MMHG | RESPIRATION RATE: 20 BRPM

## 2025-06-17 VITALS
SYSTOLIC BLOOD PRESSURE: 164 MMHG | HEART RATE: 69 BPM | BODY MASS INDEX: 31.95 KG/M2 | WEIGHT: 249 LBS | DIASTOLIC BLOOD PRESSURE: 87 MMHG | HEIGHT: 74 IN

## 2025-06-17 VITALS
HEART RATE: 87 BPM | BODY MASS INDEX: 32.03 KG/M2 | DIASTOLIC BLOOD PRESSURE: 80 MMHG | SYSTOLIC BLOOD PRESSURE: 130 MMHG | WEIGHT: 249.56 LBS | HEIGHT: 74 IN

## 2025-06-17 DIAGNOSIS — N25.81 SECONDARY HYPERPARATHYROIDISM: ICD-10-CM

## 2025-06-17 DIAGNOSIS — Z98.890 HISTORY OF COLOSTOMY REVERSAL: ICD-10-CM

## 2025-06-17 DIAGNOSIS — Z76.82 ORGAN TRANSPLANT CANDIDATE: ICD-10-CM

## 2025-06-17 DIAGNOSIS — N18.6 ESRD ON HEMODIALYSIS: ICD-10-CM

## 2025-06-17 DIAGNOSIS — Z99.2 ESRD ON HEMODIALYSIS: ICD-10-CM

## 2025-06-17 DIAGNOSIS — Z76.82 PATIENT ON WAITING LIST FOR KIDNEY TRANSPLANT: Primary | ICD-10-CM

## 2025-06-17 DIAGNOSIS — I10 ESSENTIAL HYPERTENSION: ICD-10-CM

## 2025-06-17 PROBLEM — R04.0 BLEEDING FROM THE NOSE: Status: RESOLVED | Noted: 2024-10-02 | Resolved: 2025-06-17

## 2025-06-17 PROBLEM — S31.109A OPEN WOUND OF ABDOMEN: Status: RESOLVED | Noted: 2024-11-14 | Resolved: 2025-06-17

## 2025-06-17 PROBLEM — R53.81 DEBILITY: Chronic | Status: RESOLVED | Noted: 2024-09-30 | Resolved: 2025-06-17

## 2025-06-17 PROBLEM — J96.01 ACUTE HYPOXIC RESPIRATORY FAILURE: Status: RESOLVED | Noted: 2024-09-25 | Resolved: 2025-06-17

## 2025-06-17 PROBLEM — E43 SEVERE MALNUTRITION: Status: RESOLVED | Noted: 2024-10-19 | Resolved: 2025-06-17

## 2025-06-17 LAB
AORTIC SIZE INDEX (SOV): 1.7 CM/M2
AORTIC SIZE INDEX: 1.5 CM/M2
ASCENDING AORTA: 3.7 CM
AV AREA BY CONTINUOUS VTI: 4 CM2
AV INDEX (PROSTH): 0.84
AV LVOT MEAN GRADIENT: 2 MMHG
AV LVOT PEAK GRADIENT: 4 MMHG
AV MEAN GRADIENT: 3 MMHG
AV PEAK GRADIENT: 6 MMHG
AV VALVE AREA BY VELOCITY RATIO: 4.1 CM²
AV VALVE AREA: 4.1 CM2
AV VELOCITY RATIO: 0.83
BSA FOR ECHO PROCEDURE: 2.43 M2
CV ECHO LV RWT: 0.3 CM
CV PHARM DOSE: 0.4 MG
CV STRESS BASE HR: 69 BPM
DIASTOLIC BLOOD PRESSURE: 87 MMHG
DOP CALC AO PEAK VEL: 1.2 M/S
DOP CALC AO VTI: 21.7 CM
DOP CALC LVOT AREA: 4.9 CM2
DOP CALC LVOT DIAMETER: 2.5 CM
DOP CALC LVOT PEAK VEL: 1 M/S
DOP CALC LVOT STROKE VOLUME: 89.3 CM3
DOP CALCLVOT PEAK VEL VTI: 18.2 CM
E WAVE DECELERATION TIME: 215 MS
E/A RATIO: 0.77
E/E' RATIO: 5 M/S
ECHO EF ESTIMATED: 68 %
ECHO LV POSTERIOR WALL: 0.8 CM (ref 0.6–1.1)
EJECTION FRACTION- HIGH: 65 %
EJECTION FRACTION: 65 %
END DIASTOLIC INDEX-HIGH: 153 ML/M2
END DIASTOLIC INDEX-LOW: 93 ML/M2
END SYSTOLIC INDEX-HIGH: 71 ML/M2
END SYSTOLIC INDEX-LOW: 31 ML/M2
FRACTIONAL SHORTENING: 37.7 % (ref 28–44)
INTERVENTRICULAR SEPTUM: 1.1 CM (ref 0.6–1.1)
IVC DIAMETER: 0.7 CM
LA MAJOR: 6.6 CM
LA MINOR: 6.3 CM
LA WIDTH: 4.2 CM
LEFT ATRIUM SIZE: 4.2 CM
LEFT ATRIUM VOLUME INDEX MOD: 34 ML/M2
LEFT ATRIUM VOLUME INDEX: 40 ML/M2
LEFT ATRIUM VOLUME MOD: 82 ML
LEFT ATRIUM VOLUME: 97 CM3
LEFT INTERNAL DIMENSION IN SYSTOLE: 3.3 CM (ref 2.1–4)
LEFT VENTRICLE DIASTOLIC VOLUME INDEX: 56.9 ML/M2
LEFT VENTRICLE DIASTOLIC VOLUME: 136 ML
LEFT VENTRICLE MASS INDEX: 78.4 G/M2
LEFT VENTRICLE SYSTOLIC VOLUME INDEX: 18 ML/M2
LEFT VENTRICLE SYSTOLIC VOLUME: 43 ML
LEFT VENTRICULAR INTERNAL DIMENSION IN DIASTOLE: 5.3 CM (ref 3.5–6)
LEFT VENTRICULAR MASS: 187.3 G
LV LATERAL E/E' RATIO: 5
LV SEPTAL E/E' RATIO: 5.6
MV A" WAVE DURATION": 100.86 MS
MV PEAK A VEL: 0.65 M/S
MV PEAK E VEL: 0.5 M/S
NUC REST DIASTOLIC VOLUME INDEX: 140
NUC REST EJECTION FRACTION: 63
NUC REST SYSTOLIC VOLUME INDEX: 52
NUC STRESS DIASTOLIC VOLUME INDEX: 149
NUC STRESS EJECTION FRACTION: 61 %
NUC STRESS SYSTOLIC VOLUME INDEX: 58
OHS CV CPX 85 PERCENT MAX PREDICTED HEART RATE MALE: 150
OHS CV CPX MAX PREDICTED HEART RATE: 177
OHS CV CPX PATIENT IS FEMALE: 0
OHS CV CPX PATIENT IS MALE: 1
OHS CV CPX PEAK DIASTOLIC BLOOD PRESSURE: 87 MMHG
OHS CV CPX PEAK HEAR RATE: 70 BPM
OHS CV CPX PEAK RATE PRESSURE PRODUCT: NORMAL
OHS CV CPX PEAK SYSTOLIC BLOOD PRESSURE: 165 MMHG
OHS CV CPX PERCENT MAX PREDICTED HEART RATE ACHIEVED: 40
OHS CV CPX RATE PRESSURE PRODUCT PRESENTING: NORMAL
OHS CV INITIAL DOSE: 9.9 MCG/KG/MIN
OHS CV PEAK DOSE: 32.1 MCG/KG/MIN
OHS CV RV/LV RATIO: 0.75 CM
PISA TR MAX VEL: 2.3 M/S
PULM VEIN A" WAVE DURATION": 100.86 MS
PULM VEIN S/D RATIO: 1.41
PULMONIC VEIN PEAK A VELOCITY: 0.5 M/S
PV PEAK D VEL: 0.41 M/S
PV PEAK S VEL: 0.58 M/S
RA MAJOR: 4.74 CM
RA PRESSURE ESTIMATED: 3 MMHG
RA WIDTH: 3.53 CM
RETIRED EF AND QEF - SEE NOTES: 53 %
RIGHT ATRIAL AREA: 11.4 CM2
RIGHT VENTRICLE DIASTOLIC BASEL DIMENSION: 4 CM
RV TB RVSP: 5 MMHG
RV TISSUE DOPPLER FREE WALL SYSTOLIC VELOCITY 1 (APICAL 4 CHAMBER VIEW): 11.56 CM/S
SINUS: 4.11 CM
STJ: 3.6 CM
SYSTOLIC BLOOD PRESSURE: 164 MMHG
TDI LATERAL: 0.1 M/S
TDI SEPTAL: 0.09 M/S
TDI: 0.1 M/S
TRICUSPID ANNULAR PLANE SYSTOLIC EXCURSION: 2.3 CM
TV PEAK GRADIENT: 22 MMHG
TV REST PULMONARY ARTERY PRESSURE: 24 MMHG
Z-SCORE OF LEFT VENTRICULAR DIMENSION IN END DIASTOLE: -7.02
Z-SCORE OF LEFT VENTRICULAR DIMENSION IN END SYSTOLE: -5.25

## 2025-06-17 PROCEDURE — 93306 TTE W/DOPPLER COMPLETE: CPT | Mod: TXP

## 2025-06-17 PROCEDURE — 93016 CV STRESS TEST SUPVJ ONLY: CPT | Mod: TXP,,, | Performed by: INTERNAL MEDICINE

## 2025-06-17 PROCEDURE — 3066F NEPHROPATHY DOC TX: CPT | Mod: CPTII,S$GLB,TXP, | Performed by: NURSE PRACTITIONER

## 2025-06-17 PROCEDURE — 1159F MED LIST DOCD IN RCRD: CPT | Mod: CPTII,S$GLB,TXP, | Performed by: NURSE PRACTITIONER

## 2025-06-17 PROCEDURE — 93017 CV STRESS TEST TRACING ONLY: CPT | Mod: TXP

## 2025-06-17 PROCEDURE — 99999 PR PBB SHADOW E&M-EST. PATIENT-LVL V: CPT | Mod: PBBFAC,TXP,, | Performed by: NURSE PRACTITIONER

## 2025-06-17 PROCEDURE — 4010F ACE/ARB THERAPY RXD/TAKEN: CPT | Mod: CPTII,S$GLB,TXP, | Performed by: NURSE PRACTITIONER

## 2025-06-17 PROCEDURE — 78452 HT MUSCLE IMAGE SPECT MULT: CPT | Mod: 26,TXP,, | Performed by: INTERNAL MEDICINE

## 2025-06-17 PROCEDURE — 3079F DIAST BP 80-89 MM HG: CPT | Mod: CPTII,S$GLB,TXP, | Performed by: NURSE PRACTITIONER

## 2025-06-17 PROCEDURE — 3008F BODY MASS INDEX DOCD: CPT | Mod: CPTII,S$GLB,TXP, | Performed by: NURSE PRACTITIONER

## 2025-06-17 PROCEDURE — 63600175 PHARM REV CODE 636 W HCPCS: Mod: TXP | Performed by: NURSE PRACTITIONER

## 2025-06-17 PROCEDURE — 99215 OFFICE O/P EST HI 40 MIN: CPT | Mod: S$GLB,TXP,, | Performed by: NURSE PRACTITIONER

## 2025-06-17 PROCEDURE — 93018 CV STRESS TEST I&R ONLY: CPT | Mod: TXP,,, | Performed by: INTERNAL MEDICINE

## 2025-06-17 PROCEDURE — A9502 TC99M TETROFOSMIN: HCPCS | Mod: TXP | Performed by: NURSE PRACTITIONER

## 2025-06-17 PROCEDURE — 3074F SYST BP LT 130 MM HG: CPT | Mod: CPTII,S$GLB,TXP, | Performed by: NURSE PRACTITIONER

## 2025-06-17 PROCEDURE — 76770 US EXAM ABDO BACK WALL COMP: CPT | Mod: TC,TXP

## 2025-06-17 RX ORDER — REGADENOSON 0.08 MG/ML
0.4 INJECTION, SOLUTION INTRAVENOUS
Status: COMPLETED | OUTPATIENT
Start: 2025-06-17 | End: 2025-06-17

## 2025-06-17 RX ORDER — AMINOPHYLLINE 25 MG/ML
75 INJECTION, SOLUTION INTRAVENOUS ONCE
Status: COMPLETED | OUTPATIENT
Start: 2025-06-17 | End: 2025-06-17

## 2025-06-17 RX ADMIN — TETROFOSMIN 9.9 MILLICURIE: 1.38 INJECTION, POWDER, LYOPHILIZED, FOR SOLUTION INTRAVENOUS at 10:06

## 2025-06-17 RX ADMIN — REGADENOSON 0.4 MG: 0.08 INJECTION, SOLUTION INTRAVENOUS at 11:06

## 2025-06-17 RX ADMIN — AMINOPHYLLINE 75 MG: 25 INJECTION, SOLUTION INTRAVENOUS at 11:06

## 2025-06-17 RX ADMIN — TETROFOSMIN 30.1 MILLICURIE: 1.38 INJECTION, POWDER, LYOPHILIZED, FOR SOLUTION INTRAVENOUS at 11:06

## 2025-06-17 NOTE — PROGRESS NOTES
AA YEARLY PATIENT EDUCATION NOTE    Mr. João Ham was seen in pre-kidney transplant clinic for yearly (or six months) evaluation for kidney, kidney/pancreas or pancreas only transplant.  The patient attended a web based education session that discussed/reviewed the following aspects of transplantation: UNOS waitlist management/multiple listings, types of organs offered (KDPI < 85%, KDPI > 85%, PHS increased risk, DCD, HCV+), financial aspects, surgical procedures, dietary instruction pre- and post-transplant, health maintenance pre- and post-transplant, post-transplant hospitalization and outpatient follow-up, potential to participate in a research protocol, and medication management and side effects. A question and answer session was provided after the presentation.    The patient was seen by all members of the multi-disciplinary team to include: Nephrologist/PA, , , Pharmacist and Dietician (if applicable).    The Patient was educated on OPTN policy change regarding race based eGFR. For Black or  Americans, this eGFR could have shown that their kidneys were working better than they were.    Because of this change, we are looking at everyones record and assessing waiting time for people who are eligible. We will be reviewing your medical records and will notify you if you are eligible. We also encouraged patient to provide span 20 labs that are not in our electronic medical records.    The patient reviewed and signed all consents for evaluation which were witnessed and sent to scanning into the [facility] chart.    The patient was given an education book and plan for further evaluation based on his individual assessment.      The patient was encouraged to call with any questions or concerns.

## 2025-06-17 NOTE — LETTER
June 23, 2025        Mariano Hickey  [address] [facility] [address]  Phone: [phone]  Fax: [phone]             [facility] 1st Fl  [address]  [facility] [address]  Phone: [phone]   Patient: João Ham   MR Number: [id number]   YOB: 1980   Date of Visit: 6/17/2025       Dear Dr. Mariano Hickey    Thank you for referring João Ham to me for evaluation. Attached you will find relevant portions of my assessment and plan of care.    If you have questions, please do not hesitate to call me. I look forward to following João Ham along with you.    Sincerely,    Chelo Valentino, NP    Enclosure    If you would like to receive this communication electronically, please contact [email or web address] or [phone] to request [facility] access.    [facility] is a tool which provides read-only access to select patient information with whom you have a relationship. Its easy to use and provides real time access to review your patients record including encounter summaries, notes, results, and demographic information.    If you feel you have received this communication in error or would no longer like to receive these types of communications, please e-mail [email or web address]

## 2025-06-17 NOTE — PROGRESS NOTES
Transplant Psychosocial Evaluation Update:  Last psychosocial evaluation for transplant was completed on  8- by Cecilio Forrester LMSW.     Pt presents today alone. The patient reported that his grandfather was present but had to step away due to him having a scheduled medical appointment today. Pt  present as alert, oriented to person, place, time, purpose of visit. Pt deny concerns about going through with transplant and state motivation and sense of preparedness for transplantation, caregiver role, psychosocial risk factors, medical risk factors, financial aspects, and lifetime commitments. All concerns and education points as per transplant psychosocial evaluation process addressed (also refer to psychosocial dated 3/12/2021). Pt actively participated in today's interview. Pt asking questions appropriately and denies changes to previous psychosocial evaluation for transplantation which we reviewed line by line with pt and, per pt choice.    Primary Caregivers and Transportation for Transplant:  1. Khushboo Guidry- grandmother (80 y/o) [phone] resides in [place], [place] and drives a reliable vehicle.   2. Matthew Guidry- grandfather (80 y/o) [phone] resides in [place], [place] and drives a reliable vehicle.   3. Sol Ham - daughter (23 y/o) resides in [place], [place] and drives a reliable vehicle. The patient reported that he is not sure how much assistance his daughter could provide since she just graduated from [employer].     The patient reported that he is in process of being evaluated by [facility]. The patient reported that his first appointment is scheduled for August 2025.     Both pt and caregiver/s plan to stay locally at grandparents' home under the care of his grandparents - information reviewed in depth. Caregivers plan to stay at hospital as appropriate for transplant and post-transplant process.    Pts Vocation: The patient reported that he is disabled currently. The patient reported that  the last date he worked was in 2022.     DME: [facility], thermometer, and CPAP    ADLS:  The patient reported that he completes his activities of daily living independently.    Household and Dependents: pt resides alone and has no dependents at this time.    Income: Pt states ability to afford all monthly expenses and costs including for medications now and if transplanted based on income and on savings and assets.    The patient reported that he receives assistance through the [employer].     Dialysis Adherence: Per MA's note,  FOR THE PAST THREE MONTHS:     0-AMA's  0-No-shows     No concerns with care giving, transportation, or mental health    The patient has been diagnosed with Anxiety and Depression. The patient reported that he has been adhering to his medication regiment.     Pt deny any additional concerns, stating preparedness and motivation to proceed with organ transplant.     Suitability for Transplant:   Pt remains low to moderate risk for transplant at this time based on psychosocial risk factors and strengths.  The patient has a limited support system. The patient has adequate insurance coverage at this time.     Pt grant well overall with health needs and life in general, has stable supports, adequate insurance, financial stability, transportation and caregiver plans in place, and is using no substances unless prescribed.     SW recommends that pt conduct fundraising to assist pt with pay for cost of medications, food, gas, and other transplant related needs. SW recommends that pt remain aware of potential mental health concerns and contact the team if any concerns arise.     Final determination of transplant candidacy will be reviewed and determined by the selection committee  Guevara García LCSW, Kidney Transplant   [facility]  [facility] [place] [address]

## 2025-06-17 NOTE — PROGRESS NOTES
Kidney Transplant Recipient Reevalulation    Referring Physician: Willy Hanna  Current Nephrologist: Willy Hanna  Waitlist Status: active  Dialysis Start Date: 9/8/2023    Subjective:     CC:  Annual reassessment of kidney transplant candidacy.    HPI:  Mr. Ham is a 44 y.o. year old Black or  male with ESRD secondary to HTN (biopsy proven 10/2022).  He has been on the wait list for a kidney transplant at [facility] since 6/6/2023. Patient is currently on hemodialysis started on 9/8/2023. Patient is dialyzing on MWF schedule.  Patient reports that he is tolerating dialysis well.. He has a LUE AV fistula. He is on eliquis for AVF patency.     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  Found to have colonic perforation due to mesh erosion with an abscess. Patient underwent sigmoid resection and drainage of the abscess and treated with IV antibiotics. Take back to OR for open laparotomy and noted fecal contamination of the left lower quadrant indicative of anastomotic disruption. Colon was resected, and colostomy was created. Returned in February 2025 for colostomy reversal. He has recovered well and healed from surgeries. He reports has 2 potential living donors that he is hoping will get worked up soon. He remains active without functional impairments.       CXR 7/15/2024: No acute radiographic abnormality in the chest.   PXR 2/2/2023: favorable for transplant    Renal US 6/17/2025: ckd, no solid mass or hydro  Iliacs 6/25/2024: triphasic; favorable for transplant  Echo 6/17/2025: EF 60-65%, PA 24  Stress 6/17/2025: pending    Current Outpatient Medications   Medication Sig Dispense Refill    apixaban (ELIQUIS) 2.5 mg Tab Take 1 tablet (2.5 mg total) by mouth 2 (two) times daily.      calcitRIOL (ROCALTROL) 0.25 MCG Cap Take 1 capsule (0.25 mcg total) by mouth once daily.      cholecalciferol, vitamin D3, 125 mcg (5,000 unit) Tab Take 5,000 Units by mouth every  morning.      hydrALAZINE (APRESOLINE) 100 MG tablet Take 1 tablet (100 mg total) by mouth every 8 (eight) hours. Hold for SBP < 120 and before dialysis 270 tablet 0    isosorbide mononitrate (IMDUR) 120 MG 24 hr tablet Take 1 tablet (120 mg total) by mouth every morning. 90 tablet 1    metoprolol succinate (TOPROL-XL) 50 MG 24 hr tablet Take 150 mg by mouth once daily.      olmesartan (BENICAR) 40 MG tablet Take 1 tablet (40 mg total) by mouth once daily. 90 tablet 0    sevelamer carbonate (RENVELA) 2.4 gram PwPk Take by mouth.      cloNIDine (CATAPRES) 0.3 MG tablet Take 1 tablet (0.3 mg total) by mouth 3 (three) times daily as needed (SBP > 150). (Patient not taking: Reported on 6/17/2025)      epoetin mily-epbx (RETACRIT) 20,000 unit/mL injection Inject 0.67 mLs (13,400 Units total) into the skin every Mon, Wed, Fri. (Patient not taking: Reported on 4/22/2025)      heparin sodium,porcine (HEPARIN, PORCINE,) 1,000 unit/mL injection Inject 4 mLs (4,000 Units total) into the vein as needed (line care after dialysis). (Patient not taking: Reported on 4/22/2025)      heparin sodium,porcine (HEPARIN, PORCINE,) 5,000 unit/mL injection Inject 1 mL (5,000 Units total) into the skin every 8 (eight) hours.      LIDOcaine-prilocaine (EMLA) cream Apply topically as needed (pre dialysis). (Patient not taking: Reported on 6/17/2025) 60 g 5     No current facility-administered medications for this visit.       Past Medical History:   Diagnosis Date    Allergy     Anemia     Anxiety     CHF (congestive heart failure)     Depression     Dialysis patient     High blood pressure with chronic kidney disease, stage 5 chronic kidney disease or end stage renal disease     Hypertension     Sleep apnea      Review of Systems   Constitutional:  Positive for fatigue. Negative for activity change and fever.   Eyes:  Negative for visual disturbance.   Respiratory:  Negative for cough and shortness of breath.    Cardiovascular:  Negative for  "chest pain and leg swelling.   Gastrointestinal:  Negative for abdominal pain, constipation, diarrhea and nausea.   Genitourinary:  Positive for decreased urine volume. Negative for difficulty urinating, frequency and hematuria.        Still making small amount of urine   Musculoskeletal:  Negative for arthralgias and myalgias.   Skin:  Negative for wound.   Neurological:  Negative for weakness.   Hematological:  Bruises/bleeds easily.        On eliquis for AVF   Psychiatric/Behavioral:  Negative for sleep disturbance.      Objective:   body mass index is 31.73 kg/m².  /88 (BP Location: Right arm, Patient Position: Sitting)   Pulse 90   Temp 97.3 °F (36.3 °C) (Temporal)   Resp 20   Ht 6' 2" (1.88 m)   Wt 112.1 kg (247 lb 2.2 oz)   SpO2 97%   BMI 31.73 kg/m²     Physical Exam  Vitals and nursing note reviewed.   Constitutional:       Appearance: Normal appearance.   Cardiovascular:      Rate and Rhythm: Normal rate and regular rhythm.      Heart sounds: Normal heart sounds.   Pulmonary:      Effort: Pulmonary effort is normal.      Breath sounds: Normal breath sounds.   Abdominal:      General: There is no distension.   Musculoskeletal:         General: Normal range of motion.      Comments: AVF ++   Skin:     General: Skin is warm and dry.   Neurological:      Mental Status: He is alert.         Labs:  Prostate Specific Antigen (ng/mL)   Date Value   06/17/2025 0.65     PSA, Screen (ng/mL)   Date Value   06/25/2024 0.48     Lab Results   Component Value Date    WBC 10.61 02/27/2025    HGB 9.8 (L) 02/27/2025    HCT 29.7 (L) 02/27/2025     02/28/2025    K 4.0 02/28/2025    CL 94 (L) 02/28/2025    CO2 23 02/28/2025    BUN 56 (H) 02/28/2025    CREATININE 15.4 (H) 02/28/2025    EGFRNORACEVR 3.6 (A) 02/28/2025    CALCIUM 8.9 02/28/2025    PHOS 5.7 (H) 03/27/2025    MG 1.9 02/25/2025    ALBUMIN 3.6 02/25/2025    AST 14 02/25/2025    ALT 17 02/25/2025    UTPCR 2.61 (H) 06/19/2023    .0 (H) " 06/19/2023       Lab Results   Component Value Date    BILIRUBINUA Negative 09/19/2024    PROTEINUA 2+ (A) 09/19/2024    NITRITE Negative 09/19/2024    RBCUA 4 09/19/2024    WBCUA 21 (H) 09/19/2024     Lab Results   Component Value Date    CPRA 1 05/05/2025    NB1FQEH A34 05/05/2025    CIABCLM WEAK-- B8 05/05/2025    CIIAB Negative 05/05/2025    ABCMT WEAK DP5 08/05/2024       Labs were reviewed with the patient.    Pre-transplant Workup:   Reviewed with the patient.    Assessment:     1. Patient on waiting list for kidney transplant    2. ESRD on hemodialysis    3. Essential hypertension    4. History of colostomy reversal    5. Secondary hyperparathyroidism    6. BMI 31.0-31.9,adult      Plan:   Since last visit has undergone several abdominal surgeries, needs surgical review of CT abd/pelvis.         Transplant Candidacy:   Mr. Ham is a suitable kidney transplant candidate.  Meets center eligibility for accepting HCV+ donor offer - Yes.  Patient educated on HCV+ donors. João is willing  to accept HCV+ donor offer -  Yes   Patient is a candidate for KDPI > 85 kidney donor offer - No (weight > 88 kg).  He remains in overall stable health, and will remain active on the transplant list.    Patient advised that it is recommended that all transplant candidates, and their close contacts and household members receive Covid vaccination.    Chelo Valentino NP       Follow-up:   In addition to the tests noted in the plan, Mr. Ham will continue to have reevaluation as per the standing pre-kidney transplant protocol:  Monthly blood for PRA  Annual return to clinic, except HIV positive, > 65 years of age, or pancreas transplant candidates who will be scheduled to see transplant every 6 months while in pre-transplant phase  Annual re-testing: CXR, EKG, yearly mammograms for women over 40 and PSA for males over 40, cardiology follow-up as recommended by initial cardiology pre-transplant evaluation  Renal ultrasound every  2 years  Baseline colonoscopy after age 50 and repeated as recommended    UNOS Patient Status  Functional Status: 60% - Requires occasional assistance but is able to care for needs  Physical Capacity: No Limitations

## 2025-07-02 PROCEDURE — 86832 HLA CLASS I HIGH DEFIN QUAL: CPT | Mod: TXP | Performed by: NURSE PRACTITIONER

## 2025-07-02 PROCEDURE — 36415 COLL VENOUS BLD VENIPUNCTURE: CPT | Mod: TXP

## 2025-07-02 PROCEDURE — 86833 HLA CLASS II HIGH DEFIN QUAL: CPT | Mod: TXP | Performed by: NURSE PRACTITIONER

## 2025-07-07 ENCOUNTER — LAB VISIT (OUTPATIENT)
Dept: LAB | Facility: HOSPITAL | Age: 45
End: 2025-07-07
Payer: COMMERCIAL

## 2025-07-07 DIAGNOSIS — Z76.82 ORGAN TRANSPLANT CANDIDATE: ICD-10-CM

## 2025-07-08 LAB — HPRA INTERPRETATION: NORMAL

## 2025-08-04 PROCEDURE — 36415 COLL VENOUS BLD VENIPUNCTURE: CPT | Mod: TXP

## 2025-08-04 PROCEDURE — 99001 SPECIMEN HANDLING PT-LAB: CPT | Mod: TXP | Performed by: NURSE PRACTITIONER

## 2025-08-09 ENCOUNTER — LAB VISIT (OUTPATIENT)
Dept: LAB | Facility: HOSPITAL | Age: 45
End: 2025-08-09
Payer: COMMERCIAL

## 2025-08-09 DIAGNOSIS — Z76.82 ORGAN TRANSPLANT CANDIDATE: ICD-10-CM

## 2025-08-26 ENCOUNTER — PATIENT MESSAGE (OUTPATIENT)
Dept: PULMONOLOGY | Facility: CLINIC | Age: 45
End: 2025-08-26
Payer: COMMERCIAL

## (undated) DEVICE — PAD K-THERMIA 24IN X 60IN

## (undated) DEVICE — SUT ETHILON 2-0 FS 18IN BLK

## (undated) DEVICE — SUT VLOC 90 3-0 V-20 NDL 9

## (undated) DEVICE — SYRINGE HYPODERMC LL 22G 1.5 ECLIPSE 30

## (undated) DEVICE — STAPLER ECHELON PWR CIR 29MM

## (undated) DEVICE — SET TUBE PNEUMOCLEAR SE HI FLO

## (undated) DEVICE — SYRINGE 5ML 309646

## (undated) DEVICE — SUT 3-0 VICRYL SH CR/8 18

## (undated) DEVICE — GLOVE BIOGEL PI ULTRA TOUCH GRAY SZ7.5

## (undated) DEVICE — COVER TIP CURVED SCISSORS XI

## (undated) DEVICE — ELECTRODE MEGADYNE RETURN DUAL

## (undated) DEVICE — STAPLER SUREFORM 60 SPU

## (undated) DEVICE — DRESSING ABSRBNT ISLAND 3.6X8

## (undated) DEVICE — Device

## (undated) DEVICE — PACK CUSTOM UNIV BASIN SLI

## (undated) DEVICE — GLOVE SENSICARE PI ALOE 7.5

## (undated) DEVICE — SUT SILK 0 STRANDS 30IN BLK

## (undated) DEVICE — ELECTRODE REM PLYHSV RETURN 9

## (undated) DEVICE — TOWEL OR DISP STRL BLUE 4/PK

## (undated) DEVICE — GUIDEWIRE AMPLATZ .035X260

## (undated) DEVICE — KIT MICROINTRODUCE MINI 5X10CM

## (undated) DEVICE — STAPLER SKIN ROTATING HEAD

## (undated) DEVICE — SUT SA85H SILK 2-0

## (undated) DEVICE — CULTSWAB+ AMIES W/O CHARC SNG

## (undated) DEVICE — TOWEL OR BLUE      MDT2168284

## (undated) DEVICE — SOL NACL IRR 1000ML BTL

## (undated) DEVICE — SPONGE LAP 18X18 PREWASHED

## (undated) DEVICE — INFLATOR ADVANTAGE ENCORE 26

## (undated) DEVICE — DRAPE CORETEMP FLD WRM 56X62IN

## (undated) DEVICE — SUT MONOCRYL 4-0 PS-2

## (undated) DEVICE — NEEDLE SAFETY ECLIPSE 25G 1-1/2IN 305767

## (undated) DEVICE — CANNULA SEAL 12MM

## (undated) DEVICE — BLLN MUSTANG 6 X 40 X 75

## (undated) DEVICE — SUT VICRYL PLUS 3-0 SH 18IN

## (undated) DEVICE — BLLN MUSTANG 6 X 60 X 75

## (undated) DEVICE — DRAPE FLUID WARMER ORS 44X44IN

## (undated) DEVICE — STRAP OR TABLE 5IN X 72IN

## (undated) DEVICE — PACK SIRUS BASIC V SURG STRL

## (undated) DEVICE — ELECTRODE BLD EXT 6.50 ST DISP

## (undated) DEVICE — SUT EASE CROSSBOW CLSR SYS

## (undated) DEVICE — TRAY MINOR SLIDELL MEMORIAL HOSPITAL

## (undated) DEVICE — SUT PROLENE 2-0 SH 36IN BLU

## (undated) DEVICE — DRAPE MAJOR ABD 102X122X78IN

## (undated) DEVICE — DRAPE COLUMN DAVINCI XI

## (undated) DEVICE — CATH GLIDE ANGLED 5FR 65CM

## (undated) DEVICE — SOLUTION DURAPREP 8630

## (undated) DEVICE — SYR 10CC LUER LOCK

## (undated) DEVICE — SOL IRRI STRL WATER 1000ML

## (undated) DEVICE — SUT SILK BLK. BR. 3-0 10

## (undated) DEVICE — SOL ELECTROLUBE ANTI-STIC

## (undated) DEVICE — EVACUATOR WOUND BULB 100CC

## (undated) DEVICE — GUIDEWIRE ANGLED .035 150CM

## (undated) DEVICE — CONTRAST VISIPAQUE 50ML

## (undated) DEVICE — SUT VICRYL+ 27 UR-6 VIOL

## (undated) DEVICE — SEAL UNIVERSAL 5MM-8MM XI

## (undated) DEVICE — SLEEVE SCD EXPRESS KNEE MEDIUM

## (undated) DEVICE — DRAIN PENROSE SIL 0.5X18IN

## (undated) DEVICE — GUIDEWIRE STF .035X150CM ANG

## (undated) DEVICE — DRAIN BLAKE SIL HUBLS RND 19FR

## (undated) DEVICE — BLADE SURG CARBON STEEL SZ11

## (undated) DEVICE — NDL SAFETY 21G X 1 1/2 ECLPSE

## (undated) DEVICE — BAG DECANTER 10-102

## (undated) DEVICE — SEALER LIGASURE IMPACT 18CM

## (undated) DEVICE — DRAPE T THYROID W/ARMBOARD COVER

## (undated) DEVICE — SOLUTION IRRI NS BOTTLE 1000ML R5200-01

## (undated) DEVICE — ELECTRODE BLD 1 INCH TEFLON

## (undated) DEVICE — SOL POVIDONE PREP IODINE 4 OZ

## (undated) DEVICE — CLOSURE SKIN STERI STRIP 1/2X4

## (undated) DEVICE — DRAPE ARM DAVINCI XI

## (undated) DEVICE — SUT 4-0 VICRYL / SH

## (undated) DEVICE — SPONGE GAUZE 2S 4X4 STERILE NON21424

## (undated) DEVICE — NDL ECLIPSE SAFETY 23G 1.5IN

## (undated) DEVICE — COVER PROXIMA MAYO STAND

## (undated) DEVICE — STERISTRIP 1/2 R1547

## (undated) DEVICE — SUT 3-0 12-18IN SILK

## (undated) DEVICE — SYRINGE 10ML 302995

## (undated) DEVICE — SUT SILK 3-0 SH 18IN BLACK

## (undated) DEVICE — APPLIER LIGACLIP LG 13IN

## (undated) DEVICE — APPLICATOR CHLORAPREP ORN 26ML

## (undated) DEVICE — GOWN POLY REINF BRTH SLV XL

## (undated) DEVICE — SYS SURGIPHOR STRL IRRG

## (undated) DEVICE — CANNULA REDUCER 12-8MM

## (undated) DEVICE — CUTTER PROXIMATE BLUE 75MM

## (undated) DEVICE — STAPLER INTERNL CONTOR CV BLU

## (undated) DEVICE — GLOVE PI ULTRA TOUCH G SURGEON

## (undated) DEVICE — SHEATH INTRODUCER 5FR 10CM

## (undated) DEVICE — BLLN MUSTANG 5 X 40 X 75

## (undated) DEVICE — BAG TISS RETRV MONARCH 10MM

## (undated) DEVICE — BLADE SCALPEL #11 371111

## (undated) DEVICE — GUIDEWIRE URO ANGLED 3CM TIP .035X150CM

## (undated) DEVICE — SYR ONLY LUER LOCK 20CC

## (undated) DEVICE — RELOAD SUREFORM 60 3.5 BLU 6R

## (undated) DEVICE — ELECTRODE BLADE TEFLON 6

## (undated) DEVICE — IRRIGATOR SUCTION W/TIP

## (undated) DEVICE — SUT PDS PLUS 1 TP1 96IN

## (undated) DEVICE — INTRODUCER VASC 6FX6VM*

## (undated) DEVICE — TRAY GENERAL SURGERY SMH

## (undated) DEVICE — SUT 2-0 12-18IN SILK

## (undated) DEVICE — SUT 1 48IN PDS II VIO MONO

## (undated) DEVICE — DRAPE C-SCETION FEN POUCH WIRE

## (undated) DEVICE — SUT 0 18IN SILK BLK BRAIDE

## (undated) DEVICE — SOL CLEARIFY VISUALIZATION LAP

## (undated) DEVICE — OBTURATOR BLADELESS 8MM XI CLR

## (undated) DEVICE — TRAY CATH 1-LYR URIMTR 16FR

## (undated) DEVICE — SUTURE SILK 2-0 PS 18 1588H

## (undated) DEVICE — BLLN MUSTANG 7 X 60 X 75

## (undated) DEVICE — SEALER VESSEL EXTEND

## (undated) DEVICE — SUT SILK 3-0 SH DETACH 30IN

## (undated) DEVICE — NDL INSUF ULTRA VERESS 120MM

## (undated) DEVICE — CLIPPER BLADE MOD 4406 (CAREF)

## (undated) DEVICE — SUT V-LOC 180 2-0 GS-22 9IN

## (undated) DEVICE — BELLOW CANN HEMOBLAST 1.65GR

## (undated) DEVICE — DRAPE C-ARM (FITS NEW C-ARM) 07-CA108

## (undated) DEVICE — SUT 3/0 18IN COATED VICRYLP

## (undated) DEVICE — TROCAR ENDOPATH XCEL 5X100MM

## (undated) DEVICE — SUTURE MONOCRYL 4-0 PS-2 27 MCP426H